# Patient Record
Sex: MALE | Race: WHITE | NOT HISPANIC OR LATINO | Employment: OTHER | URBAN - NONMETROPOLITAN AREA
[De-identification: names, ages, dates, MRNs, and addresses within clinical notes are randomized per-mention and may not be internally consistent; named-entity substitution may affect disease eponyms.]

---

## 2017-01-03 ENCOUNTER — OFFICE VISIT (OUTPATIENT)
Dept: FAMILY MEDICINE | Facility: CLINIC | Age: 72
End: 2017-01-03
Payer: COMMERCIAL

## 2017-01-03 VITALS
WEIGHT: 191 LBS | BODY MASS INDEX: 28.29 KG/M2 | TEMPERATURE: 98.7 F | DIASTOLIC BLOOD PRESSURE: 74 MMHG | HEART RATE: 88 BPM | HEIGHT: 69 IN | SYSTOLIC BLOOD PRESSURE: 132 MMHG | OXYGEN SATURATION: 93 %

## 2017-01-03 DIAGNOSIS — I10 HYPERTENSION, BENIGN ESSENTIAL, GOAL BELOW 140/90: ICD-10-CM

## 2017-01-03 DIAGNOSIS — C04.0 MALIGNANT NEOPLASM OF ANTERIOR PORTION OF FLOOR OF MOUTH (H): ICD-10-CM

## 2017-01-03 DIAGNOSIS — N50.89 ENLARGED TESTICLE: ICD-10-CM

## 2017-01-03 DIAGNOSIS — D49.0 IPMN (INTRADUCTAL PAPILLARY MUCINOUS NEOPLASM): ICD-10-CM

## 2017-01-03 DIAGNOSIS — E11.42 TYPE 2 DIABETES MELLITUS WITH DIABETIC POLYNEUROPATHY, WITHOUT LONG-TERM CURRENT USE OF INSULIN (H): Primary | ICD-10-CM

## 2017-01-03 DIAGNOSIS — Z11.59 NEED FOR HEPATITIS C SCREENING TEST: ICD-10-CM

## 2017-01-03 LAB
ALBUMIN SERPL-MCNC: 3.3 G/DL (ref 3.4–5)
ALP SERPL-CCNC: 69 U/L (ref 40–150)
ALT SERPL W P-5'-P-CCNC: 22 U/L (ref 0–70)
ANION GAP SERPL CALCULATED.3IONS-SCNC: 10 MMOL/L (ref 3–14)
AST SERPL W P-5'-P-CCNC: 17 U/L (ref 0–45)
BILIRUB SERPL-MCNC: 0.4 MG/DL (ref 0.2–1.3)
BUN SERPL-MCNC: 15 MG/DL (ref 7–30)
CALCIUM SERPL-MCNC: 9.1 MG/DL (ref 8.5–10.1)
CHLORIDE SERPL-SCNC: 100 MMOL/L (ref 94–109)
CO2 SERPL-SCNC: 25 MMOL/L (ref 20–32)
CREAT SERPL-MCNC: 0.76 MG/DL (ref 0.66–1.25)
ERYTHROCYTE [DISTWIDTH] IN BLOOD BY AUTOMATED COUNT: 16.7 % (ref 10–15)
GFR SERPL CREATININE-BSD FRML MDRD: ABNORMAL ML/MIN/1.7M2
GLUCOSE SERPL-MCNC: 194 MG/DL (ref 70–99)
HBA1C MFR BLD: 8 % (ref 4.3–6)
HCT VFR BLD AUTO: 40.4 % (ref 40–53)
HCV AB SERPL QL IA: NORMAL
HGB BLD-MCNC: 13.1 G/DL (ref 13.3–17.7)
MCH RBC QN AUTO: 31.9 PG (ref 26.5–33)
MCHC RBC AUTO-ENTMCNC: 32.4 G/DL (ref 31.5–36.5)
MCV RBC AUTO: 98 FL (ref 78–100)
PLATELET # BLD AUTO: 295 10E9/L (ref 150–450)
POTASSIUM SERPL-SCNC: 4 MMOL/L (ref 3.4–5.3)
PROT SERPL-MCNC: 7.9 G/DL (ref 6.8–8.8)
RBC # BLD AUTO: 4.11 10E12/L (ref 4.4–5.9)
SODIUM SERPL-SCNC: 135 MMOL/L (ref 133–144)
TSH SERPL DL<=0.05 MIU/L-ACNC: 1.37 MU/L (ref 0.4–4)
WBC # BLD AUTO: 11.7 10E9/L (ref 4–11)

## 2017-01-03 PROCEDURE — 86803 HEPATITIS C AB TEST: CPT | Mod: 90 | Performed by: FAMILY MEDICINE

## 2017-01-03 PROCEDURE — 85027 COMPLETE CBC AUTOMATED: CPT | Performed by: FAMILY MEDICINE

## 2017-01-03 PROCEDURE — 83036 HEMOGLOBIN GLYCOSYLATED A1C: CPT | Performed by: FAMILY MEDICINE

## 2017-01-03 PROCEDURE — 36415 COLL VENOUS BLD VENIPUNCTURE: CPT | Performed by: FAMILY MEDICINE

## 2017-01-03 PROCEDURE — 99214 OFFICE O/P EST MOD 30 MIN: CPT | Performed by: FAMILY MEDICINE

## 2017-01-03 PROCEDURE — 84443 ASSAY THYROID STIM HORMONE: CPT | Mod: 90 | Performed by: FAMILY MEDICINE

## 2017-01-03 PROCEDURE — 99000 SPECIMEN HANDLING OFFICE-LAB: CPT | Performed by: FAMILY MEDICINE

## 2017-01-03 PROCEDURE — 80053 COMPREHEN METABOLIC PANEL: CPT | Mod: 90 | Performed by: FAMILY MEDICINE

## 2017-01-03 NOTE — PROGRESS NOTES
"  SUBJECTIVE:                                                    Antoine Russo is a 71 year old male who presents to clinic today for the following health issues:    Diabetes Follow-up      Patient is checking blood sugars: rarely.      Diabetic concerns: blood sugar frequently over 200     Symptoms of hypoglycemia (low blood sugar): none     Paresthesias (numbness or burning in feet) or sores: Yes numb and pain     Date of last diabetic eye exam: ?   Metformin 750 mg  A1C      6.6   7/15/2016  A1C      7.8   4/4/2016  A1C      6.9   3/14/2016  A1C      6.9   3/12/2016  A1C      6.6   1/18/2016  He does not frequently check his BGs at home.  His last one was 326 on new years day in the morning. He stoped the metformin for about a week and a half and when he was sick and felt that his BGs went down.     Atenolol 100 mg  Hypertension Follow-up      Outpatient blood pressures are not being checked.    Low Salt Diet: not monitoring salt     BP Readings from Last 6 Encounters:   01/03/17 132/74   07/15/16 118/72   07/12/16 134/67   06/08/16 127/64   06/07/16 153/75   05/26/16 124/82       Sleep-  When he wakes up during the night, he cannot get back to sleep. When he is sleeping, he feels that he gets a deep sleep.  He also had a time frame when he was tired all the time. This only lasted a week or two. Now feels back to normal.    Left testicle-  He feels that his left testicle is 2-3x as big as his right and that is has been getting bigger over time. He says that this had been going on for several months. He says that \"it feels like someone kicked him\" if he sits wrong sometimes. Follows with Dr. Morales for urology. Saw him in July. He put him on Flomax which is working.    Aspirin-  He has not been taking his aspirin because he was getting nose bleeds.       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 Gerson Ravi and aristides at the  in 2012. He will now sees the q 6 months and will see them in Feb.    Problem list and histories reviewed & adjusted, as indicated.  Additional history: as documented    Recent Labs   Lab Test  08/17/16   0804  07/15/16   0904  04/18/16   0747  04/04/16   0659  03/31/16   0545  03/30/16   1426  03/14/16   0640  01/18/16   0843  04/14/15   0853   A1C   --   6.6*   --   7.8*   --    --   6.9*  6.6*  8.5*   LDL   --   110*   --    --    --    --    --   86  76   HDL   --   43   --    --    --    --    --   39*  27*   TRIG   --   134   --    --    --    --    --   136  206*   ALT   --   24   --    --   16  19   --   59  71*   CR   --   0.64*  0.69  0.68  0.89  1.18  0.66  0.67  0.66   GFRESTIMATED  84  >90  Non  GFR Calc    >90  Non  GFR Calc    >90  Non  GFR Calc    85  61  >90  Non  GFR Calc    >90  Non  GFR Calc    >90  Non  GFR Calc     GFRESTBLACK  >90  >90  African American GFR Calc    >90   GFR Calc    >90   GFR Calc    >90   GFR Calc    74  >90   GFR Calc    >90   GFR Calc    >90   GFR Calc     POTASSIUM   --   4.4  4.0  3.7  4.2  5.0  4.1  4.8  4.2   TSH   --    --    --    --    --    --    --   1.51  1.59    < > = values in this interval not displayed.      BP Readings from Last 3 Encounters:   01/03/17 132/74   07/15/16 118/72   07/12/16 134/67    Wt Readings from Last 3 Encounters:   01/03/17 86.637 kg (191 lb)   08/10/16 88.905 kg (196 lb)   07/15/16 88.905 kg (196 lb)           ROS:  C: NEGATIVE for fever, chills, change in weight  E/M: NEGATIVE for ear,  "mouth and throat problems  R: NEGATIVE for significant cough or SOB  CV: NEGATIVE for chest pain, palpitations or peripheral edema  GI: NEGATIVE for nausea, abdominal pain, heartburn, or change in bowel habits. POSITIVE for chronic diarrhea.  : negative for, dysuria, hematuria, hesitancy, retention and urgency  MUSCULOSKELETAL: NEGATIVE for significant arthralgias or myalgia  ENDOCRINE: NEGATIVE for temperature intolerance, skin/hair changes, POSITIVE  for Hx diabetes    OBJECTIVE:                                                    /74 mmHg  Pulse 88  Temp(Src) 98.7  F (37.1  C) (Tympanic)  Ht 1.753 m (5' 9\")  Wt 86.637 kg (191 lb)  BMI 28.19 kg/m2  SpO2 93%  Body mass index is 28.19 kg/(m^2).  GENERAL: healthy, alert and no distress  HEENT: TMs and canals normal, edison: post op changes, no lesions  NECK: no adenopathy, no asymmetry, masses, or scars and thyroid normal to palpation  RESP: lungs clear to auscultation - no rales, rhonchi or wheezes  CV: regular rate and rhythm, normal S1 S2, no S3 or S4, no murmur, click or rub,   ABDOMEN: soft, nontender, no hepatosplenomegaly, no masses. Several scars including a large midline scar and transverse left upper scar.   (male): Right testicle smooth, nontender. Multiple vessels palpable in left testicle. No masses or cysts palpable, tender.to palpation  MS: no gross musculoskeletal defects noted, no edema    Diagnostic Test Results:  Results for orders placed or performed in visit on 01/03/17 (from the past 24 hour(s))   HEMOGLOBIN A1C   Result Value Ref Range    Hemoglobin A1C 8.0 (H) 4.3 - 6.0 %   CBC with platelets   Result Value Ref Range    WBC 11.7 (H) 4.0 - 11.0 10e9/L    RBC Count 4.11 (L) 4.4 - 5.9 10e12/L    Hemoglobin 13.1 (L) 13.3 - 17.7 g/dL    Hematocrit 40.4 40.0 - 53.0 %    MCV 98 78 - 100 fl    MCH 31.9 26.5 - 33.0 pg    MCHC 32.4 31.5 - 36.5 g/dL    RDW 16.7 (H) 10.0 - 15.0 %    Platelet Count 295 150 - 450 10e9/L        ASSESSMENT/PLAN:   "                                                    ASSESSMENT:  1. Type 2 diabetes mellitus with diabetic polyneuropathy, without long-term current use of insulin (H)    2. Hypertension, benign essential, goal below 140/90    3. Enlarged testicle    4. Need for hepatitis C screening test    5. IPMN (intraductal papillary mucinous neoplasm)    6. 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 Bili, TP)     CBC with platelets     TSH     UROLOGY ADULT REFERRAL     Discussed suggestions to help with sleep.  ultrasound of testicle, then follow up with Dr Morales  Recheck 3 months    Patient Instructions   Watch that diet    Sleep instructions given    This document serves as a record of the services and decisions personally performed and made by Katie Gross MD. It was created on her behalf by Ana Pereira, a trained medical scribe. The creation of this document is based the provider's statements to the medical scribe.  Ana Pereira 8:37 AM 1/3/2017    Provider:   The information in this document, created by the medical scribe for me, accurately reflects the services I personally performed and the decisions made by me. I have reviewed and approved this document for accuracy prior to leaving the patient care area.  Katie Gross MD 8:37 AM 1/3/2017    Katie Gross MD  [facility]

## 2017-01-03 NOTE — MR AVS SNAPSHOT
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)           Care Instructions    Watch that diet    General recommendations for sleep problems (Insomnia)  Allow 2-4 weeks to see results   Establish a regular sleep schedule   Go to bed at same time each night   Get up at same time each day   Avoid sleeping-in on Sunday morning   Cut down time in bed (if not asleep, get up)   Avoid trying to force yourself to sleep   Use your bed only for sleep and sex   Do not read or watch Television in bed   Make the bedroom comfortable   Keep temperature in your bedroom comfortable   Keep bedroom quiet when sleeping   Consider ear plugs (silicon)   Keep bedroom dark enough   Use dark blinds or wear an eye mask if needed   Relax at bedtime   Relax each muscle group individually   Begin with your feet   Work toward your head   Deal with your worries before bedtime   Set aside a worry time for 30 minutes earlier   Listen to relaxation tapes   Classical Music or Nature sounds   Imagine a tranquil scene (e.g. waterfall or beach)   Back Massage   Gentle 5-minute back rub prior to bedtime   Perform measures to make you tired at bedtime   Get regular Exercise each day (6 hours before bedtime)   Take medications only as directed   Eat a light bedtime snack or warm drink   Warm milk   Warm herbal tea (non-caffeinated)   Special Therapy  Measures   Sleep Restriction Therapy   Limit time in bed for 15 minutes more than sleep   Do not set limit under 4 hours   Increase time by 15 minutes per effective sleep trial   Effective sleep suggests >90% of bedtime asleep   Stimulus Control   Do not lie awake for more than 30 minutes   Get out of bed and perform a quiet activity   Return to bed when sleepy   Repeat as many times per night as needed   No Napping during day   Things to avoid   Do not Exercise just before bedtime   No overstimulating activities just before bed   No competitive games before bedtime   No exciting Television programs before bedtime   Avoid caffeine after lunchtime   Avoid chocolate   Avoid coffee, tea, or soda   Do not use alcohol to induce sleep (worsens Insomnia)   Do not take someone else's sleeping pills   Do not look at the clock when awakening   Do not turn on light when getting up to use bathroom   Read the book Say Good Night To Insomnia        Follow-ups after your visit        Additional Services     UROLOGY ADULT REFERRAL       Your provider has referred you to: [facility]: [facility] [phone]   [email or web address]    Please be aware that coverage of these services is subject to the terms and limitations of your health insurance plan.  Call member services at your health plan with any benefit or coverage questions.      Please bring the following with you to your appointment:    (1) Any X-Rays, CTs or MRIs which have been performed.  Contact the facility where they were done to arrange for  prior to your scheduled appointment.    (2) List of current medications  (3) This referral request   (4) Any documents/labs given to you for this referral                  Your next 10 appointments already scheduled     Feb 08, 2017 10:10 AM   (Arrive by 9:55 AM)   RETURN TUMOR VISIT with Gerson Ravi MD   [facility] ([facility])    [address] [id number] [phone]              Future tests that were ordered for you today     Open Future Orders        Priority Expected Expires Ordered    US Testicular & Scrotum w Doppler Ltd Routine  1/3/2018 1/3/2017            Who to contact     If you have questions or need follow up information about today's clinic visit or your schedule please contact [facility] directly at [phone].  Normal or non-critical lab and imaging results will be communicated to you by [employer], letter or phone within 4 business days after the clinic has received the results. If you do not hear from us within 7 days, please contact the clinic through artaculous or phone. If you have a critical or abnormal lab result, we will notify you by phone as soon as possible.  Submit refill requests through artaculous or call your pharmacy and they will forward the refill request to us. Please allow 3 business days for your refill to be completed.          Additional Information About Your Visit        artaculous Information     [employer]ulous gives you secure access to your electronic health record. If you see a primary care provider, you can also send messages to your care team and make appointments. If you have questions, please call your primary care clinic.  If you do not have a primary care provider, please call [phone] and they will assist you.        Your Vitals Were     Pulse Temperature Height BMI (Body Mass Index) Pulse Oximetry       88 98.7  F (37.1  C) (Tympanic) 5' 9\" (1.753 m) 28.19 kg/m2 93%        Blood Pressure from Last 3 Encounters:   01/03/17 132/74   07/15/16 118/72   07/12/16 134/67    Weight from Last 3 Encounters:   01/03/17 191 lb (86.637 kg)   08/10/16 196 lb (88.905 kg)   07/15/16 196 lb (88.905 kg)              We Performed the Following     CBC with platelets     Comprehensive metabolic panel (BMP + Alb, Alk Phos, ALT, AST, Total. Bili, TP)     HEMOGLOBIN A1C     " Hepatitis C Screen Reflex to HCV RNA Quant and Genotype     TSH     UROLOGY ADULT REFERRAL        Primary Care Provider Office Phone # Fax #    Katie Gross [phone]       [facility] [address]  [address]        Thank you!     Thank you for choosing [facility]  for your care. Our goal is always to provide you with excellent care. Hearing back from our patients is one way we can continue to improve our services. Please take a few minutes to complete the written survey that you may receive in the mail after your visit with us. Thank you!             Your Updated Medication List - Protect others around you: Learn how to safely use, store and throw away your medicines at www.disposemymeds.org.          This list is accurate as of: 1/3/17  9:05 AM.  Always use your most recent med list.                   Brand Name Dispense Instructions for use    aspirin 81 MG tablet      Take 81 mg by mouth daily       atenolol 100 MG tablet    TENORMIN    90 tablet    Take 1 tablet (100 mg) by mouth daily       blood glucose lancets standard    no brand specified    1 Box    Use to test blood sugar two times daily       blood glucose monitoring test strip    no brand specified    100 each    Use to test blood sugar two times daily       metFORMIN 750 MG 24 hr tablet    GLUCOPHAGE-XR    180 tablet    Take 2 tablets (1,500 mg) by mouth daily       multivitamin, therapeutic with minerals Tabs tablet     30 each    Take 1 tablet by mouth daily.       tamsulosin 0.4 MG capsule    FLOMAX    90 capsule    Take 1 capsule (0.4 mg) by mouth daily

## 2017-01-03 NOTE — NURSING NOTE
"Chief Complaint   Patient presents with     Hypertension     recheck     Diabetes       Initial /74 mmHg  Pulse 88  Temp(Src) 98.7  F (37.1  C) (Tympanic)  Ht 5' 9\" (1.753 m)  Wt 191 lb (86.637 kg)  BMI 28.19 kg/m2  SpO2 93% Estimated body mass index is 28.19 kg/(m^2) as calculated from the following:    Height as of this encounter: 5' 9\" (1.753 m).    Weight as of this encounter: 191 lb (86.637 kg).  BP completed using cuff size: large    "

## 2017-01-03 NOTE — PATIENT INSTRUCTIONS
Watch that diet    General recommendations for sleep problems (Insomnia)  Allow 2-4 weeks to see results   Establish a regular sleep schedule   Go to bed at same time each night   Get up at same time each day   Avoid sleeping-in on Sunday morning   Cut down time in bed (if not asleep, get up)   Avoid trying to force yourself to sleep   Use your bed only for sleep and sex   Do not read or watch Television in bed   Make the bedroom comfortable   Keep temperature in your bedroom comfortable   Keep bedroom quiet when sleeping   Consider ear plugs (silicon)   Keep bedroom dark enough   Use dark blinds or wear an eye mask if needed   Relax at bedtime   Relax each muscle group individually   Begin with your feet   Work toward your head   Deal with your worries before bedtime   Set aside a worry time for 30 minutes earlier   Listen to relaxation tapes   Classical Music or Nature sounds   Imagine a tranquil scene (e.g. waterfall or beach)   Back Massage   Gentle 5-minute back rub prior to bedtime   Perform measures to make you tired at bedtime   Get regular Exercise each day (6 hours before bedtime)   Take medications only as directed   Eat a light bedtime snack or warm drink   Warm milk   Warm herbal tea (non-caffeinated)   Special Therapy Measures   Sleep Restriction Therapy   Limit time in bed for 15 minutes more than sleep   Do not set limit under 4 hours   Increase time by 15 minutes per effective sleep trial   Effective sleep suggests >90% of bedtime asleep   Stimulus Control   Do not lie awake for more than 30 minutes   Get out of bed and perform a quiet activity   Return to bed when sleepy   Repeat as many times per night as needed   No Napping during day   Things to avoid   Do not Exercise just before bedtime   No overstimulating activities just before bed   No competitive games before bedtime   No exciting Television programs before bedtime   Avoid caffeine after lunchtime   Avoid chocolate   Avoid coffee, tea, or  soda   Do not use alcohol to induce sleep (worsens Insomnia)   Do not take someone else's sleeping pills   Do not look at the clock when awakening   Do not turn on light when getting up to use bathroom   Read the book Say Good Night To Insomnia

## 2017-01-05 ENCOUNTER — HOSPITAL ENCOUNTER (OUTPATIENT)
Dept: ULTRASOUND IMAGING | Facility: CLINIC | Age: 72
Discharge: HOME | End: 2017-01-05
Attending: FAMILY MEDICINE | Admitting: FAMILY MEDICINE
Payer: COMMERCIAL

## 2017-01-05 DIAGNOSIS — N50.89 ENLARGED TESTICLE: ICD-10-CM

## 2017-01-05 PROCEDURE — 93976 VASCULAR STUDY: CPT

## 2017-01-31 ENCOUNTER — OFFICE VISIT (OUTPATIENT)
Dept: UROLOGY | Facility: CLINIC | Age: 72
End: 2017-01-31
Payer: COMMERCIAL

## 2017-01-31 VITALS
DIASTOLIC BLOOD PRESSURE: 75 MMHG | HEART RATE: 67 BPM | SYSTOLIC BLOOD PRESSURE: 154 MMHG | BODY MASS INDEX: 27.99 KG/M2 | WEIGHT: 189 LBS | HEIGHT: 69 IN | RESPIRATION RATE: 16 BRPM

## 2017-01-31 DIAGNOSIS — N45.1 EPIDIDYMITIS: Primary | ICD-10-CM

## 2017-01-31 PROCEDURE — 51798 US URINE CAPACITY MEASURE: CPT | Performed by: UROLOGY

## 2017-01-31 PROCEDURE — 99214 OFFICE O/P EST MOD 30 MIN: CPT | Mod: 25 | Performed by: UROLOGY

## 2017-01-31 RX ORDER — CIPROFLOXACIN 500 MG/1
500 TABLET, FILM COATED ORAL 2 TIMES DAILY
Qty: 14 TABLET | Refills: 0 | Status: SHIPPED | OUTPATIENT
Start: 2017-01-31 | End: 2017-02-28

## 2017-01-31 NOTE — PATIENT INSTRUCTIONS
Take Advil (ibuprofen) 600 mg (three 200 mg tablets) three times each day for 5 days.    Take ciprofloxacin 500 mg twice each day for one week.

## 2017-01-31 NOTE — NURSING NOTE
"Chief Complaint   Patient presents with     RECHECK     US Results, Epididymal Cysts        Initial /75 mmHg  Pulse 67  Resp 16  Ht 5' 9\" (1.753 m)  Wt 189 lb (85.73 kg)  BMI 27.90 kg/m2 Estimated body mass index is 27.9 kg/(m^2) as calculated from the following:    Height as of this encounter: 5' 9\" (1.753 m).    Weight as of this encounter: 189 lb (85.73 kg).  BP completed using cuff size: vivian Ashford CMA     "

## 2017-01-31 NOTE — NURSING NOTE
Active order to obtain bladder scan? Yes   Name of ordering provider:  Dr Morales  Bladder scan preformed post void No.  Bladder scan reveled 117ML  Provider notified?  Yes    Joellen Ashford CMA

## 2017-01-31 NOTE — PROGRESS NOTES
Appointment source: Established Patient  Patient name: Antoine LOPEZ Rafaela  Urology Staff: Steven Morales MD    Subjective: This is a 71 year old year old male complaining of left hemiscrotal pain    Objective:  States that he has been having pain in the left hemiscrotum that is sometimes severe for several months. Denies any trauma.    Has a past history of squamous cell cancer of the mouth and pancreatic neoplasm both managed surgically.    Concerned, with his past neoplastic history, about any discomforts and or masses developing anywhere.    Voiding symptoms currently satisfactory while taking tamsulosin. Post void residual slightly higher today but did not void immediately prior to the study    Examination:    External genitalia normal in appearance  Right hemiscrotum normal  Left hemiscrotum reveals induration of the vas deferens and epididymis  Generalized mild tenderness noted in the left hemiscrotum    Assessment:  Mild to moderate scrotal discomfort with anatomic changes in the scrotum that may be secondary to previous cancer surgery.    Plan:  Will treat symptomatically for now and then return for follow up as needed.    Total time 25 minutes, counseling 15 minutes discussing clinical significance of scrotal discomfort.

## 2017-01-31 NOTE — MR AVS SNAPSHOT
After Visit Summary   1/31/2017    Antoine Russo    MRN: [id number]           Patient Information     Date Of Birth          1945        Visit Information        Provider Department      1/31/2017 8:00 AM TEJINDER Morales MD [facility]        Today's Diagnoses     Epididymitis    -  1       Care Instructions    Take Advil (ibuprofen) 600 mg (three 200 mg tablets) three times each day for 5 days.    Take ciprofloxacin 500 mg twice each day for one week.          Follow-ups after your visit        Your next 10 appointments already scheduled     Feb 08, 2017 10:10 AM   (Arrive by 9:55 AM)   RETURN TUMOR VISIT with Gerson Ravi MD   [facility]ose [facility] ([facility] [place])    [address]  [address] [facility] [address] [phone]            Apr 04, 2017  8:00 AM   Office Visit with Katie Gross MD   [facility] ([facility])    [address] [place] [address] [phone]           Bring a current list of meds and any records pertaining to this visit.  For Physicals, please bring immunization records and any forms needing to be filled out.  Please arrive 10 minutes early to complete paperwork.              Who to contact     If you have questions or need follow up information about today's clinic visit or your schedule please contact [facility] directly at [phone].  Normal or non-critical lab and imaging results will be communicated to you by MyChart, letter or phone within 4 business days after the clinic has received the results. If you do not hear from us within 7 days, please contact the clinic through MyChart or phone. If you have a critical or abnormal lab result, we will notify you by phone as soon as possible.  Submit refill requests through [employer] or call your pharmacy and they will forward the refill request to us. Please allow 3 business days for your refill to be  "completed.          Additional Information About Your Visit        [facility] Information     [employer] gives you secure access to your electronic health record. If you see a primary care provider, you can also send messages to your care team and make appointments. If you have questions, please call your primary care clinic.  If you do not have a primary care provider, please call [phone] and they will assist you.        Care EveryWhere ID     This is your Care EveryWhere ID. This could be used by other organizations to access your [place] medical records  [id number]        Your Vitals Were     Pulse Respirations Height BMI (Body Mass Index)          67 16 5' 9\" (1.753 m) 27.90 kg/m2         Blood Pressure from Last 3 Encounters:   01/31/17 154/75   01/03/17 132/74   07/15/16 118/72    Weight from Last 3 Encounters:   01/31/17 189 lb (85.73 kg)   01/03/17 191 lb (86.637 kg)   08/10/16 196 lb (88.905 kg)              We Performed the Following     MEASURE POST-VOID RESIDUAL URINE/BLADDER CAPACITY, US NON-IMAGING          Today's Medication Changes          These changes are accurate as of: 1/31/17  8:23 AM.  If you have any questions, ask your nurse or doctor.               Start taking these medicines.        Dose/Directions    ciprofloxacin 500 MG tablet   Commonly known as:  CIPRO   Used for:  Epididymitis   Started by:  TEJINDER Morales MD        Dose:  500 mg   Take 1 tablet (500 mg) by mouth 2 times daily   Quantity:  14 tablet   Refills:  0            Where to get your medicines      These medications were sent to [facility] [address] [place] [address] [address]  [address] [address]     Phone:  [phone]    - ciprofloxacin 500 MG tablet             Primary Care Provider Office Phone # Fax #    Katie Gross [phone]       [facility] [address] [facility] [address]        Thank you!     Thank you for choosing [facility]  for " your care. Our goal is always to provide you with excellent care. Hearing back from our patients is one way we can continue to improve our services. Please take a few minutes to complete the written survey that you may receive in the mail after your visit with us. Thank you!             Your Updated Medication List - Protect others around you: Learn how to safely use, store and throw away your medicines at www.disposemymeds.org.          This list is accurate as of: 1/31/17  8:23 AM.  Always use your most recent med list.                   Brand Name Dispense Instructions for use    aspirin 81 MG tablet      Take 81 mg by mouth daily       atenolol 100 MG tablet    TENORMIN    90 tablet    Take 1 tablet (100 mg) by mouth daily       blood glucose lancets standard    no brand specified    1 Box    Use to test blood sugar two times daily       blood glucose monitoring test strip    no brand specified    100 each    Use to test blood sugar two times daily       ciprofloxacin 500 MG tablet    CIPRO    14 tablet    Take 1 tablet (500 mg) by mouth 2 times daily       metFORMIN 750 MG 24 hr tablet    GLUCOPHAGE-XR    180 tablet    Take 2 tablets (1,500 mg) by mouth daily       multivitamin, therapeutic with minerals Tabs tablet     30 each    Take 1 tablet by mouth daily.       tamsulosin 0.4 MG capsule    FLOMAX    90 capsule    Take 1 capsule (0.4 mg) by mouth daily

## 2017-02-08 ENCOUNTER — OFFICE VISIT (OUTPATIENT)
Dept: OTOLARYNGOLOGY | Facility: CLINIC | Age: 72
End: 2017-02-08

## 2017-02-08 VITALS — HEIGHT: 69 IN | WEIGHT: 195 LBS | BODY MASS INDEX: 28.88 KG/M2

## 2017-02-08 DIAGNOSIS — C04.9 MALIGNANT NEOPLASM OF FLOOR OF MOUTH (H): Primary | ICD-10-CM

## 2017-02-08 DIAGNOSIS — C06.9 ORAL CANCER (H): ICD-10-CM

## 2017-02-08 ASSESSMENT — PAIN SCALES - GENERAL: PAINLEVEL: MILD PAIN (2)

## 2017-02-08 NOTE — LETTER
2/8/2017       RE: Antoine Russo  [address] [address]     Dear Colleague,    Thank you for referring your patient, Antoine Russo, to the [facility] EAR NOSE AND TH[facility]AT at [facility]. Please see a copy of my visit note below.    February 8, 2017      PRIOR ONCOLOGIC HISTORY:  Mr. 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  He did not have postoperative radiotherapy and had a forearm free flap reconstruction by Dr. Yung Alvares.  His major difficulty occurred in 2013, when he developed C. diff colitis and had to have a colectomy.      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  He had a post-operative infection that required an extended stay in the hospital. He has since recovered since that time.     HISTORY OF PRESENT ILLNESS:    has been doing very well.  He is eating well and not having any trouble retaining food in the mouth.  He is swallowing well as well.  There has been absolutely no symptoms, and he feels great.      PHYSICAL EXAMINATION:  He looks remarkably well.  His speech is clear.  The floor of the mouth looks beautiful.  The flap is in good position and all the induration is now completely gone, and it is very soft.  The tongue moves well, and his speech is clear.  The oral cavity and oropharynx look clear as well.  Palpation of the tongue and floor of mouth are clear.  Palpation of the base of tongue is soft but I cannot adequately feel the base of tongue.  The neck has no evidence of adenopathy.      IMPRESSION AND PLAN:  I offered Mr. Russo a fiberoptic examination today, but he would like to defer.  We need to do this next time.  We will see him back at his five year  anniversary in August with a CT scan.   He remains with no evidence of disease at this point.       Gerson Ravi MD  Otolaryngology/Head & Neck Surgery  [phone]    cc:   Katie Yoder MD   [facility]    [address] [place] [place]  [address]      Andrea García MD   [facility]   [address] [place] [place]  [address]      Yung Alvares MD   Otolaryngology    [facility] 396     Grabiel Plata MD   Division of Gastroenterology   [facility] 36

## 2017-02-08 NOTE — PROGRESS NOTES
February 8, 2017      PRIOR ONCOLOGIC HISTORY:  Mr. 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  He did not have postoperative radiotherapy and had a forearm free flap reconstruction by Dr. Yung Alvares.  His major difficulty occurred in 2013, when he developed C. diff colitis and had to have a colectomy.      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  He had a post-operative infection that required an extended stay in the hospital. He has since recovered since that time.     HISTORY OF PRESENT ILLNESS:    has been doing very well.  He is eating well and not having any trouble retaining food in the mouth.  He is swallowing well as well.  There has been absolutely no symptoms, and he feels great.      PHYSICAL EXAMINATION:  He looks remarkably well.  His speech is clear.  The floor of the mouth looks beautiful.  The flap is in good position and all the induration is now completely gone, and it is very soft.  The tongue moves well, and his speech is clear.  The oral cavity and oropharynx look clear as well.  Palpation of the tongue and floor of mouth are clear.  Palpation of the base of tongue is soft but I cannot adequately feel the base of tongue.  The neck has no evidence of adenopathy.      IMPRESSION AND PLAN:  I offered Mr. Russo a fiberoptic examination today, but he would like to defer.  We need to do this next time.  We will see him back at his five year anniversary in August with a CT scan.   He remains with no evidence of disease at this point.       Gerson Ravi MD  Otolaryngology/Head & Neck Surgery  [phone]            cc:   Katie Yoder MD   [address] [place]  [address]      Andrea García MD   [place]   [address] [place]  [address]      Yung Alvares MD   Otolaryngology    [facility] 396     Grabiel Plata MD   Division of Gastroenterology   [facility] 36

## 2017-02-28 ENCOUNTER — OFFICE VISIT (OUTPATIENT)
Dept: UROLOGY | Facility: CLINIC | Age: 72
End: 2017-02-28
Payer: COMMERCIAL

## 2017-02-28 VITALS — DIASTOLIC BLOOD PRESSURE: 84 MMHG | SYSTOLIC BLOOD PRESSURE: 144 MMHG | HEART RATE: 74 BPM | RESPIRATION RATE: 18 BRPM

## 2017-02-28 DIAGNOSIS — I86.1 LEFT VARICOCELE: Primary | ICD-10-CM

## 2017-02-28 PROCEDURE — 99213 OFFICE O/P EST LOW 20 MIN: CPT | Performed by: UROLOGY

## 2017-02-28 NOTE — MR AVS SNAPSHOT
After Visit Summary   2/28/2017    Antoine Russo    MRN: [id number]           Patient Information     Date Of Birth          1945        Visit Information        Provider Department      2/28/2017 8:00 AM TEJINDER Morales MD [facility]        Today's Diagnoses     Left varicocele    -  1      Care Instructions    Per Physician's instructions          Follow-ups after your visit        Your next 10 appointments already scheduled     Apr 04, 2017  8:00 AM CDT   Office Visit with Katie Gross MD   [facility] ([facility])    [address] [facility] [address] [phone]           Bring a current list of meds and any records pertaining to this visit.  For Physicals, please bring immunization records and any forms needing to be filled out.  Please arrive 10 minutes early to complete paperwork.            Jul 31, 2017  8:00 AM CDT   CT CHEST W CONTRAST with WYCT1   [facility] CT ([facility])    [address] [facility] [address] [facility] [address] [address] [phone]           Please bring any scans or X-rays taken at other hospitals, if similar tests were done. Also bring a list of your medicines, including vitamins, minerals and over-the-counter drugs. It is safest to leave personal items at home.  Be sure to tell your doctor:   If you have any allergies.   If there s any chance you are pregnant.   If you are breastfeeding.   If you have any special needs.  You will have contrast for this exam. To prepare:   Do not eat or drink for 2 hours before your exam. If you need to take medicine, you may take it with small sips of water. (We may ask you to take liquid medicine as well.)   The day before your exam, drink extra fluids at least six 8-ounce glasses (unless your doctor tells you to restrict your fluids).  Patients over 70 or patients with diabetes or kidney problems:   If you haven t had a blood test (creatinine test) within the  last 30 days, go to your clinic or Diagnostic Imaging Department for this test.  If you have diabetes:   If your kidney function is normal, continue taking your metformin (Avandamet, Glucophage, Glucovance, Metaglip) on the day of your exam.   If your kidney function is abnormal, wait 48 hours before restarting this medicine.  Please wear loose clothing, such as a sweat suit or jogging clothes. Avoid snaps, zippers and other metal. We may ask you to undress and put on a hospital gown.  If you have any questions, please call the Imaging Department where you will have your exam.            Jul 31, 2017  8:30 AM CDT   CT SOFT TISSUE NECK W CONTRAST with WYCT1   [facility] CT ([facility])    [address] [address] [phone]           Please bring any scans or X-rays taken at other hospitals, if similar tests were done. Also bring a list of your medicines, including vitamins, minerals and over-the-counter drugs. It is safest to leave personal items at home.  Be sure to tell your doctor:   If you have any allergies.   If there s any chance you are pregnant.   If you are breastfeeding.   If you have any special needs.  You will have contrast for this exam. To prepare:   Do not eat or drink for 2 hours before your exam. If you need to take medicine, you may take it with small sips of water. (We may ask you to take liquid medicine as well.)   The day before your exam, drink extra fluids at least six 8-ounce glasses (unless your doctor tells you to restrict your fluids).  Patients over 70 or patients with diabetes or kidney problems:   If you haven t had a blood test (creatinine test) within the last 30 days, go to your clinic or Diagnostic Imaging Department for this test.  If you have diabetes:   If your kidney function is normal, continue taking your metformin (Avandamet, Glucophage, Glucovance, Metaglip) on the day of your exam.   If your kidney function is abnormal, wait 48  hours before restarting this medicine.  Please wear loose clothing, such as a sweat suit or jogging clothes. Avoid snaps, zippers and other metal. We may ask you to undress and put on a hospital gown.  If you have any questions, please call the Imaging Department where you will have your exam.            Aug 02, 2017 10:00 AM CDT   (Arrive by 9:45 AM)   RETURN TUMOR VISIT with Gerson Ravi MD   [facility] Ear Nose and Th[facility]at ([facility] [place])    [address] [place] [address]  [address] [facility] [address] [phone]              Who to contact     If you have questions or need follow up information about today's clinic visit or your schedule please contact [facility] directly at [phone].  Normal or non-critical lab and imaging results will be communicated to you by MyChart, letter or phone within 4 business days after the clinic has received the results. If you do not hear from us within 7 days, please contact the clinic through [employer] or phone. If you have a critical or abnormal lab result, we will notify you by phone as soon as possible.  Submit refill requests through [employer] or call your pharmacy and they will forward the refill request to us. Please allow 3 business days for your refill to be completed.          Additional Information About Your Visit        [employer] Information     [employer] gives you secure access to your electronic health record. If you see a primary care provider, you can also send messages to your care team and make appointments. If you have questions, please call your primary care clinic.  If you do not have a primary care provider, please call [phone] and they will assist you.        Care EveryWhere ID     This is your Care EveryWhere ID. This could be used by other organizations to access your [place] medical records  [id number]        Your Vitals Were     Pulse Respirations                74 18           Blood Pressure from Last 3 Encounters:    02/28/17 144/84   01/31/17 154/75   01/03/17 132/74    Weight from Last 3 Encounters:   02/08/17 195 lb (88.5 kg)   01/31/17 189 lb (85.7 kg)   01/03/17 191 lb (86.6 kg)              Today, you had the following     No orders found for display       Primary Care Provider Office Phone # Fax #    Katie Gross [phone]       [facility] [address] [facility] [address]        Thank you!     Thank you for choosing [facility]  for your care. Our goal is always to provide you with excellent care. Hearing back from our patients is one way we can continue to improve our services. Please take a few minutes to complete the written survey that you may receive in the mail after your visit with us. Thank you!             Your Updated Medication List - Protect others around you: Learn how to safely use, store and throw away your medicines at www.disposemymeds.org.          This list is accurate as of: 2/28/17 11:02 AM.  Always use your most recent med list.                   Brand Name Dispense Instructions for use    aspirin 81 MG tablet      Take 81 mg by mouth daily Reported on 2/28/2017       atenolol 100 MG tablet    TENORMIN    90 tablet    Take 1 tablet (100 mg) by mouth daily       blood glucose lancets standard    no brand specified    1 Box    Use to test blood sugar two times daily       blood glucose monitoring test strip    no brand specified    100 each    Use to test blood sugar two times daily       metFORMIN 750 MG 24 hr tablet    GLUCOPHAGE-XR    180 tablet    Take 2 tablets (1,500 mg) by mouth daily       multivitamin, therapeutic with minerals Tabs tablet     30 each    Take 1 tablet by mouth daily.       tamsulosin 0.4 MG capsule    FLOMAX    90 capsule    Take 1 capsule (0.4 mg) by mouth daily

## 2017-02-28 NOTE — PROGRESS NOTES
Appointment source: Established Patient  Patient name: Antoine LOPEZ Rafaela  Urology Staff: Steven Morales MD    Subjective: This is a 71 year old year old male returning for follow up of left hemiscrotal pain    Objective:  Continues to have pain in the left hemiscrotum.    Did get brief improvement from a course of antibiotic and antiinflammatory.    Examination reveals a varicocele.    Will ask him to be evaluated by Dr. Aggarwal for possible microscopic varicocelectomy. 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    Plan:  Consultation with Dr. Aggarwal.    Total time 20 minutes, counseling 15 minutes discussing management of scrotal pain.

## 2017-02-28 NOTE — NURSING NOTE
"Chief Complaint   Patient presents with     RECHECK     Left Scrotal Pain and Swelling        Initial /84 (BP Location: Right arm, Patient Position: Chair, Cuff Size: Adult Regular)  Pulse 74  Resp 18 Estimated body mass index is 29.21 kg/(m^2) as calculated from the following:    Height as of 2/8/17: 5' 8.5\" (1.74 m).    Weight as of 2/8/17: 195 lb (88.5 kg).  BP completed using cuff size: regular      Joellen Ashford CMA     "

## 2017-03-03 ENCOUNTER — PRE VISIT (OUTPATIENT)
Dept: UROLOGY | Facility: CLINIC | Age: 72
End: 2017-03-03

## 2017-03-10 ENCOUNTER — CARE COORDINATION (OUTPATIENT)
Dept: UROLOGY | Facility: CLINIC | Age: 72
End: 2017-03-10

## 2017-03-10 ENCOUNTER — OFFICE VISIT (OUTPATIENT)
Dept: UROLOGY | Facility: CLINIC | Age: 72
End: 2017-03-10

## 2017-03-10 VITALS
BODY MASS INDEX: 28.88 KG/M2 | WEIGHT: 195 LBS | DIASTOLIC BLOOD PRESSURE: 76 MMHG | HEART RATE: 66 BPM | SYSTOLIC BLOOD PRESSURE: 150 MMHG | HEIGHT: 69 IN

## 2017-03-10 DIAGNOSIS — N50.819 PAIN OF TESTES: ICD-10-CM

## 2017-03-10 DIAGNOSIS — I86.1 LEFT VARICOCELE: Primary | ICD-10-CM

## 2017-03-10 RX ORDER — CEFAZOLIN SODIUM 1 G/3ML
1 INJECTION, POWDER, FOR SOLUTION INTRAMUSCULAR; INTRAVENOUS SEE ADMIN INSTRUCTIONS
Status: CANCELLED | OUTPATIENT
Start: 2017-03-10

## 2017-03-10 ASSESSMENT — ENCOUNTER SYMPTOMS
BLOOD IN STOOL: 0
VOMITING: 0
BOWEL INCONTINENCE: 0
HEARTBURN: 0
CONSTIPATION: 0
ABDOMINAL PAIN: 1
RECTAL BLEEDING: 0
NAUSEA: 0
DIARRHEA: 1
JAUNDICE: 0
RECTAL PAIN: 0
BLOATING: 0

## 2017-03-10 ASSESSMENT — PAIN SCALES - GENERAL: PAINLEVEL: MODERATE PAIN (5)

## 2017-03-10 NOTE — MR AVS SNAPSHOT
After Visit Summary   3/10/2017    Antoine Russo    MRN: [id number]           Patient Information     Date Of Birth          1945        Visit Information        Provider Department      3/10/2017 9:20 AM Marcio Aggarwal MD [facility] and [facility]        Today's Diagnoses     Left varicocele    -  1    Pain of testes          Care Instructions    Schedule surgery with Dr Aggarwal    It was a pleasure meeting with you today.  Thank you for allowing me and my team the privilege of caring for you today.  YOU are the reason we are here, and I truly hope we provided you with the excellent service you deserve.  Please let us know if there is anything else we can do for you so that we can be sure you are leaving completely satisfied with your care experience.                Follow-ups after your visit        Additional Services     PAC Visit Referral (For [facility] Only)       Does this visit require an Anesthesia consult?  Yes - Evaluate for medical necessity related to one of the following conditions:  Management of CAD    H&P done by:  N/A and PCP      Please be aware that coverage of these services is subject to the terms and limitations of your health insurance plan.  Call member services at your health plan with any benefit or coverage questions.      Please bring the following to your appointment:  >>   Any x-rays, CTs or MRIs which have been performed.  Contact the facility where they were done to arrange for  prior to your scheduled appointment.  Any new CT, MRI or other procedures ordered by your specialist must be performed at a [facility] facility or coordinated by your clinic's referral office.    >>   List of current medications  >>   This referral request   >>   Any documents/labs given to you for this referral                  Follow-up notes from your care team     Return if symptoms worsen or fail to improve.      Your next 10 appointments  already scheduled     Apr 04, 2017  8:00 AM CDT   Office Visit with Katie Gross MD   [facility] ([facility])    [address] [facility] [id number] [phone]           Bring a current list of meds and any records pertaining to this visit.  For Physicals, please bring immunization records and any forms needing to be filled out.  Please arrive 10 minutes early to complete paperwork.            May 09, 2017  9:00 AM CDT   (Arrive by 8:45 AM)   PAC RN ASSESSMENT with  Pac Rn   [facility] ([facility])    [address] [place] [address]  [address] [facility] [address]   [phone]            May 09, 2017  9:30 AM CDT   (Arrive by 9:15 AM)   PAC EVALUATION with  Pac Mya 2   [facility] ([facility])    [address] [place] [address]  [address] [facility] [address]   [phone]            May 09, 2017 10:30 AM CDT   (Arrive by 10:15 AM)   PAC Anesthesia Consult with  Pac Anesthesiologist   [facility] ([facility])    [address] [place] [address]  [address] [facility] [address]   [phone]            May 23, 2017   Procedure with Marcio Aggarwal MD   [facility] ([facility])    [address] [place] [address]  [address] [facility] [address]   [phone]           Located in the Clinics and Surgery Center at [address] [place] [address].   parking is very convenient and highly recommended.  is a $6 flat rate fee.  Both  and self parkers should enter the main arrival plaza from [facility]; parking attendants will direct you based on your parking preference.            Jun 22, 2017  8:40 AM CDT   (Arrive by 8:25 AM)   Post-Op with Marcio Aggarwal MD   [facility]rolog[facility] ([facility]  [place])    [address] [facility]  [address] [facility] [address] [phone]            Jul 31, 2017  8:00 AM CDT   CT CHEST W CONTRAST with [email or web address]   [facility] ([facility])    [address] [facility] [address]   [phone]           Please bring any scans or X-rays taken at other hospitals, if similar tests were done. Also bring a list of your medicines, including vitamins, minerals and over-the-counter drugs. It is safest to leave personal items at home.  Be sure to tell your doctor:   If you have any allergies.   If there s any chance you are pregnant.   If you are breastfeeding.   If you have any special needs.  You will have contrast for this exam. To prepare:   Do not eat or drink for 2 hours before your exam. If you need to take medicine, you may take it with small sips of water. (We may ask you to take liquid medicine as well.)   The day before your exam, drink extra fluids at least six 8-ounce glasses (unless your doctor tells you to restrict your fluids).  Patients over 70 or patients with diabetes or kidney problems:   If you haven t had a blood test (creatinine test) within the last 30 days, go to your clinic or Diagnostic Imaging Department for this test.  If you have diabetes:   If your kidney function is normal, continue taking your metformin (Avandamet, Glucophage, Glucovance, Metaglip) on the day of your exam.   If your kidney function is abnormal, wait 48 hours before restarting this medicine.  Please wear loose clothing, such as a sweat suit or jogging clothes. Avoid snaps, zippers and other metal. We may ask you to undress and put on a hospital gown.  If you have any questions, please call the Imaging Department where you will have your exam.            Jul 31, 2017  8:30 AM CDT   CT SOFT TISSUE NECK W CONTRAST with WYCT1   [facility] CT ([facility])    [address] [facility] [address]   [phone]           Please  bring any scans or X-rays taken at other hospitals, if similar tests were done. Also bring a list of your medicines, including vitamins, minerals and over-the-counter drugs. It is safest to leave personal items at home.  Be sure to tell your doctor:   If you have any allergies.   If there s any chance you are pregnant.   If you are breastfeeding.   If you have any special needs.  You will have contrast for this exam. To prepare:   Do not eat or drink for 2 hours before your exam. If you need to take medicine, you may take it with small sips of water. (We may ask you to take liquid medicine as well.)   The day before your exam, drink extra fluids at least six 8-ounce glasses (unless your doctor tells you to restrict your fluids).  Patients over 70 or patients with diabetes or kidney problems:   If you haven t had a blood test (creatinine test) within the last 30 days, go to your clinic or Diagnostic Imaging Department for this test.  If you have diabetes:   If your kidney function is normal, continue taking your metformin (Avandamet, Glucophage, Glucovance, Metaglip) on the day of your exam.   If your kidney function is abnormal, wait 48 hours before restarting this medicine.  Please wear loose clothing, such as a sweat suit or jogging clothes. Avoid snaps, zippers and other metal. We may ask you to undress and put on a hospital gown.  If you have any questions, please call the Imaging Department where you will have your exam.            Aug 02, 2017 10:00 AM CDT   (Arrive by 9:45 AM)   RETURN TUMOR VISIT with Gerson Ravi MD   [facility] ([facility])    [address] [place] [address] [phone]              Who to contact     Please call your clinic at [phone] to:    Ask questions about your health    Make or cancel appointments    Discuss your medicines    Learn about your test results    Speak to your doctor   If you have compliments or  "concerns about an experience at your clinic, or if you wish to file a complaint, please contact [facility] Patient Relations at [phone] or email us at [email or web address]         Additional Information About Your Visit        MyChart Information     Frank[facility] Chathart gives you secure access to your electronic health record. If you see a primary care provider, you can also send messages to your care team and make appointments. If you have questions, please call your primary care clinic.  If you do not have a primary care provider, please call [phone] and they will assist you.      Wordlock is an electronic gateway that provides easy, online access to your medical records. With Wordlock, you can request a clinic appointment, read your test results, renew a prescription or communicate with your care team.     To access your existing account, please contact your [facility] Clinic or call [phone] for assistance.        Care EveryWhere ID     This is your Care EveryWhere ID. This could be used by other organizations to access your [facility] medical records  [id number]        Your Vitals Were     Pulse Height BMI (Body Mass Index)             66 1.74 m (5' 8.5\") 29.22 kg/m2          Blood Pressure from Last 3 Encounters:   03/10/17 150/76   02/28/17 144/84   01/31/17 154/75    Weight from Last 3 Encounters:   03/10/17 88.5 kg (195 lb)   02/08/17 88.5 kg (195 lb)   01/31/17 85.7 kg (189 lb)              We Performed the Following     Left varicocele repair     PAC Visit Referral (For [facility] Only)        Primary Care Provider Office Phone # Fax #    Katie Gross [phone]       [facility] [address] [place] [phone]        Thank you!     Thank you for choosing [facility] AND [facility] PROSTATE AND UROLOGIC CANCERS  for your care. Our goal is always to provide you with excellent care. Hearing back from our " patients is one way we can continue to improve our services. Please take a few minutes to complete the written survey that you may receive in the mail after your visit with us. Thank you!             Your Updated Medication List - Protect others around you: Learn how to safely use, store and throw away your medicines at www.disposemymeds.org.          This list is accurate as of: 3/10/17 11:59 PM.  Always use your most recent med list.                   Brand Name Dispense Instructions for use    aspirin 81 MG tablet      Take 81 mg by mouth daily Reported on 2/28/2017       atenolol 100 MG tablet    TENORMIN    90 tablet    Take 1 tablet (100 mg) by mouth daily       blood glucose lancets standard    no brand specified    1 Box    Use to test blood sugar two times daily       blood glucose monitoring test strip    no brand specified    100 each    Use to test blood sugar two times daily       metFORMIN 750 MG 24 hr tablet    GLUCOPHAGE-XR    180 tablet    Take 2 tablets (1,500 mg) by mouth daily       multivitamin, therapeutic with minerals Tabs tablet     30 each    Take 1 tablet by mouth daily.       tamsulosin 0.4 MG capsule    FLOMAX    90 capsule    Take 1 capsule (0.4 mg) by mouth daily

## 2017-03-10 NOTE — PROGRESS NOTES
Pre Op Teaching Flowsheet       Pre and Post op Patient Education  Relevant Diagnosis:  Testicular pain  Surgical procedure:  Left varicocele repair, denervation of left testis.  Teaching Topic:  Pre and post op teaching  Person Involved in teaching: Antoine Russo    Motivation Level:  Asks Questions: Yes  Eager to Learn:  Yes  Cooperative: Yes  Receptive (willing/able to accept information):  Yes    Patient demonstrates understanding of the following:  Date of surgery:  5/16/17  Location of surgery:  [facility]- 5th Floor  History and Physical and any other testing necessary prior to surgery: Yes  Required time line for completion of History and Physical and any pre-op testing: Yes    Patient demonstrates understanding of the following:  Pre-op bowel prep:  N/A  Pre-op showering/scrub information with PCMX Soap: Yes  Blood thinner medications discussed and when to stop (if applicable):  Yes      Infection Prevention:   Patient demonstrates understanding of the following:  Surgical procedure site care taught: Yes  Signs and symptoms of infection taught:  Yes      Post-op follow-up:  Discussed how to contact the hospital, nurse, and clinic scheduling staff if necessary.    Instructional materials used/given/mailed:  [facility] Booklet, post op teaching sheet, Map, Soap, and arrival/location information.    Surgical instructions packet given to patient in office:  Yes.  Pac questions -negative

## 2017-03-10 NOTE — PATIENT INSTRUCTIONS
Schedule surgery with Dr Aggarwal    It was a pleasure meeting with you today.  Thank you for allowing me and my team the privilege of caring for you today.  YOU are the reason we are here, and I truly hope we provided you with the excellent service you deserve.  Please let us know if there is anything else we can do for you so that we can be sure you are leaving completely satisfied with your care experience.

## 2017-03-11 NOTE — PROGRESS NOTES
Pt follow-up from Dr. Morales.    Left scrotal pain since 12/2016.  Tried antibiotics initially for epididymitis.  ibu helped at first but now pain recurred.  Discomfort radiates to mid left abdomen.  Pain worse if he lays down/ pinched between legs.    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  After this, started with the left testis pain.    On exam today, left grade III varicocele.  Testes ~16cc bilateral.    A  Left grade III varicocele.  Left testis pain.    Plan  Chronic pain in left scrotum. Options I discussed with him today were:  1.  Observation  2. Conservative management with sitz bath, nsaids, repeat trial abx.  3. Left varicocele repair/ microscopic denervation of the spermatic cord ( estimate ~80-90% chance pain improvement, 80+% chance pain cure in my personal experience).  4. Orchiectomy, estimate 95% pain cure, but could have phantom pain. Would also loose endocrine function of the testis.    He would like to proceed with varicocele repair.  Will also make this a denervation, basically the same operation as long as we're repairing the large left varicocele.    Discussed risks, benefits, and alternatives of varix repair, including various methods.  I counseled him extensively on the nature of varicoceles, and their possible effects on testosterone production and fertility.  I described surgery and embolization approaches, and the detailed risks of surgical repair.  These include damage to artery (ischemia), damage to lymphatics ( hydrocele), as well as risk of recurrence (</= 1%). Discussed an estimated 75+% chance of pain relief with varicocele repair, more with [facility].    He will schedule at his convenience.  25min visit, over 50% face to face in counseling/discussion of above issues.

## 2017-04-04 ENCOUNTER — OFFICE VISIT (OUTPATIENT)
Dept: FAMILY MEDICINE | Facility: CLINIC | Age: 72
End: 2017-04-04
Payer: COMMERCIAL

## 2017-04-04 VITALS
TEMPERATURE: 97.9 F | SYSTOLIC BLOOD PRESSURE: 139 MMHG | HEART RATE: 64 BPM | BODY MASS INDEX: 28.92 KG/M2 | WEIGHT: 193 LBS | DIASTOLIC BLOOD PRESSURE: 80 MMHG

## 2017-04-04 DIAGNOSIS — I86.1 LEFT VARICOCELE: ICD-10-CM

## 2017-04-04 DIAGNOSIS — I10 HYPERTENSION, BENIGN ESSENTIAL, GOAL BELOW 140/90: ICD-10-CM

## 2017-04-04 DIAGNOSIS — N40.0 HYPERTROPHY OF PROSTATE WITHOUT URINARY OBSTRUCTION: ICD-10-CM

## 2017-04-04 DIAGNOSIS — E11.42 TYPE 2 DIABETES MELLITUS WITH DIABETIC POLYNEUROPATHY, WITHOUT LONG-TERM CURRENT USE OF INSULIN (H): Primary | ICD-10-CM

## 2017-04-04 LAB — HBA1C MFR BLD: 8.9 % (ref 4.3–6)

## 2017-04-04 PROCEDURE — 99214 OFFICE O/P EST MOD 30 MIN: CPT | Performed by: FAMILY MEDICINE

## 2017-04-04 PROCEDURE — 36415 COLL VENOUS BLD VENIPUNCTURE: CPT | Performed by: FAMILY MEDICINE

## 2017-04-04 PROCEDURE — 83036 HEMOGLOBIN GLYCOSYLATED A1C: CPT | Performed by: FAMILY MEDICINE

## 2017-04-04 RX ORDER — GLIPIZIDE 2.5 MG/1
2.5 TABLET, EXTENDED RELEASE ORAL DAILY
Qty: 90 TABLET | Refills: 1 | Status: SHIPPED | OUTPATIENT
Start: 2017-04-04 | End: 2017-10-10

## 2017-04-04 RX ORDER — METFORMIN HYDROCHLORIDE 750 MG/1
1500 TABLET, EXTENDED RELEASE ORAL DAILY
Qty: 180 TABLET | Refills: 3 | Status: SHIPPED | OUTPATIENT
Start: 2017-04-04 | End: 2018-01-30

## 2017-04-04 RX ORDER — ATENOLOL 100 MG/1
100 TABLET ORAL DAILY
Qty: 90 TABLET | Refills: 3 | Status: SHIPPED | OUTPATIENT
Start: 2017-04-04 | End: 2017-08-22

## 2017-04-04 NOTE — PROGRESS NOTES
SUBJECTIVE:                                                    Antoine Russo is a 71 year old male who presents to clinic today for the following health issues:    Hypertension Follow-up      Outpatient blood pressures are not being checked.    Low Salt Diet: not monitoring salt     BP Readings from Last 6 Encounters:   04/04/17 139/80   03/10/17 150/76   02/28/17 144/84   01/31/17 154/75   01/03/17 132/74   07/15/16 118/72   Atenolol 100 mg  Tolerating medications well without significant side effects      Amount of exercise or physical activity: None    Problems taking medications regularly: No    Medication side effects: none    Diet: regular (no restrictions)      Diabetes Follow-up      Patient is checking blood sugars: not at all    Diabetic concerns: None     Symptoms of hypoglycemia (low blood sugar): none     Paresthesias (numbness or burning in feet) or sores: Yes      Date of last diabetic eye exam: 2015     Lab Results   Component Value Date    A1C 8.0 01/03/2017    A1C 6.6 07/15/2016    A1C 7.8 04/04/2016    A1C 6.9 03/14/2016    A1C 6.9 03/12/2016   Metformin 1500 mg      Left varicocele-  On May 23 he has an orchiectomy for Left grade III varicocele with Dr. Aggarwal as well as a denervation.     BPH  Flomax is working well    Problem list and histories reviewed & adjusted, as indicated.  Additional history: as documented    BP Readings from Last 3 Encounters:   04/04/17 139/80   03/10/17 150/76   02/28/17 144/84    Wt Readings from Last 3 Encounters:   04/04/17 87.5 kg (193 lb)   03/10/17 88.5 kg (195 lb)   02/08/17 88.5 kg (195 lb)           Reviewed and updated as needed this visit by clinical staff  Tobacco  Allergies  Problems  Med Hx  Surg Hx  Fam Hx  Soc Hx      Reviewed and updated as needed this visit by Provider  Allergies  Problems         ROS:  C: NEGATIVE for fever, chills, change in weight  E/M: NEGATIVE for ear, mouth and throat problems  R: NEGATIVE for significant cough or  SOB  CV: NEGATIVE for chest pain, palpitations or peripheral edema  GI: NEGATIVE for nausea, abdominal pain, heartburn, or change in bowel habits  : negative for dysuria, hematuria, decreased urinary stream    OBJECTIVE:                                                    /80 (BP Location: Right arm)  Pulse 64  Temp 97.9  F (36.6  C) (Tympanic)  Wt 87.5 kg (193 lb)  BMI 28.92 kg/m2  Body mass index is 28.92 kg/(m^2).  GENERAL: healthy, alert and no distress  HENT: ear canals and TM's normal, nose and mouth without ulcers or lesions  NECK: no adenopathy, no asymmetry, masses, or scars and thyroid normal to palpation  RESP: lungs clear to auscultation - no rales, rhonchi or wheezes  CV: regular rate and rhythm, normal S1 S2, no S3 or S4, no murmur, click or rub, no peripheral edema  ABDOMEN: soft, nontender, no hepatosplenomegaly, no masses, fairly prominent midline scar, oblique left scar, several other smaller scars.  MS: no gross musculoskeletal defects noted, no edema      Diagnostic Test Results:  Results for orders placed or performed in visit on 04/04/17 (from the past 24 hour(s))   Hemoglobin A1c   Result Value Ref Range    Hemoglobin A1C 8.9 (H) 4.3 - 6.0 %        ASSESSMENT/PLAN:                                                    Antoine was seen today for hypertension and diabetes.    Diagnoses and all orders for this visit:    Type 2 diabetes mellitus with diabetic polyneuropathy, without long-term current use of insulin (H)  -     metFORMIN (GLUCOPHAGE-XR) 750 MG 24 hr tablet; Take 2 tablets (1,500 mg) by mouth daily  -     Hemoglobin A1c  -     glipiZIDE (GLIPIZIDE XL) 2.5 MG 24 hr tablet; Take 1 tablet (2.5 mg) by mouth daily    Hypertension, benign essential, goal below 140/90  -     atenolol (TENORMIN) 100 MG tablet; Take 1 tablet (100 mg) by mouth daily    Hypertrophy of prostate without urinary obstruction  Patient is on Flomax    Left varicocele  Patient is followed by urology.    Antoine  "does not want to be on a statin as \"they get a bad review.\" He also had a friend who was on a statin and  and now Antoine doesn't want to go on one. I discussed the benefits of being on a statin and all the risk factors that he currently has, but he still declined.   Started Glipizide in hopes of getting his A1C back under control. He is not testing his blood sugars at this time.       Patient Instructions   Start your new diabetes medicine-Glipizide. Watch for low blood sugars. Once a day    See you in 3 months      This document serves as a record of the services and decisions personally performed and made by Katie Gorss MD. It was created on her behalf by Ana Pereira, a trained medical scribe. The creation of this document is based the provider's statements to the medical scribe.  Ana Pereira 8:13 AM 2017    Provider:   The information in this document, created by the medical scribe for me, accurately reflects the services I personally performed and the decisions made by me. I have reviewed and approved this document for accuracy prior to leaving the patient care area.  Katie Gross MD 8:13 AM 2017    Katie Gross MD  [facility]  "

## 2017-04-04 NOTE — NURSING NOTE
"Chief Complaint   Patient presents with     Hypertension     recheck       Initial /80 (BP Location: Right arm)  Pulse 64  Temp 97.9  F (36.6  C) (Tympanic)  Wt 193 lb (87.5 kg)  BMI 28.92 kg/m2 Estimated body mass index is 28.92 kg/(m^2) as calculated from the following:    Height as of 3/10/17: 5' 8.5\" (1.74 m).    Weight as of this encounter: 193 lb (87.5 kg).  Medication Reconciliation: complete    Health Maintenance that is potentially due pending provider review:  NONE    n/a    "

## 2017-04-04 NOTE — PATIENT INSTRUCTIONS
Start your new diabetes medicine-Glipizide. Watch for low blood sugars. Once a day    See you in 3 months

## 2017-04-04 NOTE — MR AVS SNAPSHOT
After Visit Summary   4/4/2017    Antoine Russo    MRN: [id number]           Patient Information     Date Of Birth          1945        Visit Information        Provider Department      4/4/2017 8:00 AM Katie Gross MD [facility]        Today's Diagnoses     Type 2 diabetes mellitus with diabetic polyneuropathy, without long-term current use of insulin (H)    -  1    Hypertension, benign essential, goal below 140/90        Hypertrophy of prostate without urinary obstruction        Left varicocele          Care Instructions    Start your new diabetes medicine-Glipizide. Watch for low blood sugars. Once a day    See you in 3 months        Follow-ups after your visit        Your next 10 appointments already scheduled     May 09, 2017  9:00 AM CDT   (Arrive by 8:45 AM)   PAC RN ASSESSMENT with Uc Pac Rn   [facility] ([facility])    [address] [place] [address]  4th [facility] [address]   [phone]            May 09, 2017  9:30 AM CDT   (Arrive by 9:15 AM)   PAC EVALUATION with  Pac Mya 2   [facility] ([facility])    [address] [place] [address]  4th [facility] [address]   [phone]            May 09, 2017 10:30 AM CDT   (Arrive by 10:15 AM)   PAC Anesthesia Consult with  Pac Anesthesiologist   [facility] ([facility])    [address] [place] [address]   [phone]            May 23, 2017   Procedure with Marcio Aggarwal MD   [facility] ([facility])    [address] [place] [address]  5th [facility] [address]   [phone]           Located in the Clinics and Surgery Center at [address] [place] [address].   parking is very convenient and highly recommended.  is a $6 flat rate fee.  Both   and self parkers should enter the main arrival plaza from [facility]; parking attendants will direct you based on your parking preference.            Jun 22, 2017  8:40 AM CDT   (Arrive by 8:25 AM)   Post-Op with Marcio Aggarwal MD   [facility]rologic C[facility] ([facility])    [address] [facility] [address]  4th Floor  [facility] [address] [phone]            Jul 31, 2017  8:00 AM CDT   CT CHEST W CONTRAST with [email or web address]   [facility] ([facility])    [address] [facility] [address] [phone]           Please bring any scans or X-rays taken at other hospitals, if similar tests were done. Also bring a list of your medicines, including vitamins, minerals and over-the-counter drugs. It is safest to leave personal items at home.  Be sure to tell your doctor:   If you have any allergies.   If there s any chance you are pregnant.   If you are breastfeeding.   If you have any special needs.  You will have contrast for this exam. To prepare:   Do not eat or drink for 2 hours before your exam. If you need to take medicine, you may take it with small sips of water. (We may ask you to take liquid medicine as well.)   The day before your exam, drink extra fluids at least six 8-ounce glasses (unless your doctor tells you to restrict your fluids).  Patients over 70 or patients with diabetes or kidney problems:   If you haven t had a blood test (creatinine test) within the last 30 days, go to your clinic or Diagnostic Imaging Department for this test.  If you have diabetes:   If your kidney function is normal, continue taking your metformin (Avandamet, Glucophage, Glucovance, Metaglip) on the day of your exam.   If your kidney function is abnormal, wait 48 hours before restarting this medicine.  Please wear loose clothing, such as a sweat suit or jogging clothes. Avoid snaps, zippers and other metal. We may ask you to undress  and put on a hospital gown.  If you have any questions, please call the Imaging Department where you will have your exam.            Jul 31, 2017  8:30 AM CDT   CT SOFT TISSUE NECK W CONTRAST with WYCT1   [facility] CT ([facility])    [address] [facility] [address]   [phone]           Please bring any scans or X-rays taken at other hospitals, if similar tests were done. Also bring a list of your medicines, including vitamins, minerals and over-the-counter drugs. It is safest to leave personal items at home.  Be sure to tell your doctor:   If you have any allergies.   If there s any chance you are pregnant.   If you are breastfeeding.   If you have any special needs.  You will have contrast for this exam. To prepare:   Do not eat or drink for 2 hours before your exam. If you need to take medicine, you may take it with small sips of water. (We may ask you to take liquid medicine as well.)   The day before your exam, drink extra fluids at least six 8-ounce glasses (unless your doctor tells you to restrict your fluids).  Patients over 70 or patients with diabetes or kidney problems:   If you haven t had a blood test (creatinine test) within the last 30 days, go to your clinic or Diagnostic Imaging Department for this test.  If you have diabetes:   If your kidney function is normal, continue taking your metformin (Avandamet, Glucophage, Glucovance, Metaglip) on the day of your exam.   If your kidney function is abnormal, wait 48 hours before restarting this medicine.  Please wear loose clothing, such as a sweat suit or jogging clothes. Avoid snaps, zippers and other metal. We may ask you to undress and put on a hospital gown.  If you have any questions, please call the Imaging Department where you will have your exam.            Aug 02, 2017 10:00 AM CDT   (Arrive by 9:45 AM)   RETURN TUMOR VISIT with Gerson Ravi MD   [facility]  [place])    [address] [facility] [address]  4th [facility] [address] [phone]              Who to contact     If you have questions or need follow up information about today's clinic visit or your schedule please contact [facility] directly at [phone].  Normal or non-critical lab and imaging results will be communicated to you by MyChart, letter or phone within 4 business days after the clinic has received the results. If you do not hear from us within 7 days, please contact the clinic through [employer] or phone. If you have a critical or abnormal lab result, we will notify you by phone as soon as possible.  Submit refill requests through [employer] or call your pharmacy and they will forward the refill request to us. Please allow 3 business days for your refill to be completed.          Additional Information About Your Visit        MyChart Information     [employer] gives you secure access to your electronic health record. If you see a primary care provider, you can also send messages to your care team and make appointments. If you have questions, please call your primary care clinic.  If you do not have a primary care provider, please call [phone] and they will assist you.        Care EveryWhere ID     This is your Care EveryWhere ID. This could be used by other organizations to access your [facility] medical records  [id number]        Your Vitals Were     Pulse Temperature BMI (Body Mass Index)             64 97.9  F (36.6  C) (Tympanic) 28.92 kg/m2          Blood Pressure from Last 3 Encounters:   04/04/17 139/80   03/10/17 150/76   02/28/17 144/84    Weight from Last 3 Encounters:   04/04/17 193 lb (87.5 kg)   03/10/17 195 lb (88.5 kg)   02/08/17 195 lb (88.5 kg)              We Performed the Following     Hemoglobin A1c          Today's Medication Changes          These changes are accurate as of: 4/4/17  8:49 AM.  If you have any questions, ask your nurse or doctor.                Start taking these medicines.        Dose/Directions    glipiZIDE 2.5 MG 24 hr tablet   Commonly known as:  glipiZIDE XL   Used for:  Type 2 diabetes mellitus with diabetic polyneuropathy, without long-term current use of insulin (H)   Started by:  Katie Gross MD        Dose:  2.5 mg   Take 1 tablet (2.5 mg) by mouth daily   Quantity:  90 tablet   Refills:  1            Where to get your medicines      [facility] [address] [facility] [facility]  [address] [facility] [address]     Phone:  [phone]     atenolol 100 MG tablet    glipiZIDE 2.5 MG 24 hr tablet    metFORMIN 750 MG 24 hr tablet                Primary Care Provider Office Phone # Fax #    Katie Gross [phone]       [facility] [address] [facility] [address]        Thank you!     Thank you for choosing [facility]  for your care. Our goal is always to provide you with excellent care. Hearing back from our patients is one way we can continue to improve our services. Please take a few minutes to complete the written survey that you may receive in the mail after your visit with us. Thank you!             Your Updated Medication List - Protect others around you: Learn how to safely use, store and throw away your medicines at www.disposemymeds.org.          This list is accurate as of: 4/4/17  8:49 AM.  Always use your most recent med list.                   Brand Name Dispense Instructions for use    aspirin 81 MG tablet      Take 81 mg by mouth daily Reported on 2/28/2017       atenolol 100 MG tablet    TENORMIN    90 tablet    Take 1 tablet (100 mg) by mouth daily       blood glucose lancets standard    no brand specified    1 Box    Use to test blood sugar two times daily       blood glucose monitoring test strip    no brand specified    100 each    Use to test blood sugar two times daily       glipiZIDE 2.5 MG 24 hr tablet    glipiZIDE  XL    90 tablet    Take 1 tablet (2.5 mg) by mouth daily       metFORMIN 750 MG 24 hr tablet    GLUCOPHAGE-XR    180 tablet    Take 2 tablets (1,500 mg) by mouth daily       multivitamin, therapeutic with minerals Tabs tablet     30 each    Take 1 tablet by mouth daily.       tamsulosin 0.4 MG capsule    FLOMAX    90 capsule    Take 1 capsule (0.4 mg) by mouth daily

## 2017-04-13 ENCOUNTER — TRANSFERRED RECORDS (OUTPATIENT)
Dept: HEALTH INFORMATION MANAGEMENT | Facility: CLINIC | Age: 72
End: 2017-04-13

## 2017-05-09 ENCOUNTER — ALLIED HEALTH/NURSE VISIT (OUTPATIENT)
Dept: SURGERY | Facility: CLINIC | Age: 72
End: 2017-05-09

## 2017-05-09 ENCOUNTER — ANESTHESIA EVENT (OUTPATIENT)
Dept: SURGERY | Facility: AMBULATORY SURGERY CENTER | Age: 72
End: 2017-05-09

## 2017-05-09 ENCOUNTER — OFFICE VISIT (OUTPATIENT)
Dept: SURGERY | Facility: CLINIC | Age: 72
End: 2017-05-09

## 2017-05-09 VITALS
HEART RATE: 65 BPM | BODY MASS INDEX: 28.62 KG/M2 | OXYGEN SATURATION: 94 % | WEIGHT: 199.9 LBS | HEIGHT: 70 IN | TEMPERATURE: 98.2 F | DIASTOLIC BLOOD PRESSURE: 87 MMHG | RESPIRATION RATE: 16 BRPM | SYSTOLIC BLOOD PRESSURE: 163 MMHG

## 2017-05-09 DIAGNOSIS — Z12.5 ENCOUNTER FOR SCREENING FOR MALIGNANT NEOPLASM OF PROSTATE: ICD-10-CM

## 2017-05-09 DIAGNOSIS — Z01.818 PREOP GENERAL PHYSICAL EXAM: Primary | ICD-10-CM

## 2017-05-09 DIAGNOSIS — Z01.818 PREOP GENERAL PHYSICAL EXAM: ICD-10-CM

## 2017-05-09 DIAGNOSIS — R39.15 URGENCY OF URINATION: ICD-10-CM

## 2017-05-09 LAB
ALBUMIN UR-MCNC: NEGATIVE MG/DL
ANION GAP SERPL CALCULATED.3IONS-SCNC: 8 MMOL/L (ref 3–14)
APPEARANCE UR: ABNORMAL
BACTERIA #/AREA URNS HPF: ABNORMAL /HPF
BILIRUB UR QL STRIP: NEGATIVE
BUN SERPL-MCNC: 13 MG/DL (ref 7–30)
CALCIUM SERPL-MCNC: 9.6 MG/DL (ref 8.5–10.1)
CHLORIDE SERPL-SCNC: 100 MMOL/L (ref 94–109)
CO2 SERPL-SCNC: 30 MMOL/L (ref 20–32)
COLOR UR AUTO: YELLOW
CREAT SERPL-MCNC: 0.77 MG/DL (ref 0.66–1.25)
ERYTHROCYTE [DISTWIDTH] IN BLOOD BY AUTOMATED COUNT: 15.5 % (ref 10–15)
GFR SERPL CREATININE-BSD FRML MDRD: ABNORMAL ML/MIN/1.7M2
GLUCOSE SERPL-MCNC: 140 MG/DL (ref 70–99)
GLUCOSE UR STRIP-MCNC: NEGATIVE MG/DL
HCT VFR BLD AUTO: 46.4 % (ref 40–53)
HGB BLD-MCNC: 14.8 G/DL (ref 13.3–17.7)
HGB UR QL STRIP: NEGATIVE
KETONES UR STRIP-MCNC: NEGATIVE MG/DL
LEUKOCYTE ESTERASE UR QL STRIP: ABNORMAL
MCH RBC QN AUTO: 31.5 PG (ref 26.5–33)
MCHC RBC AUTO-ENTMCNC: 31.9 G/DL (ref 31.5–36.5)
MCV RBC AUTO: 99 FL (ref 78–100)
NITRATE UR QL: POSITIVE
PH UR STRIP: 5 PH (ref 5–7)
PLATELET # BLD AUTO: 315 10E9/L (ref 150–450)
POTASSIUM SERPL-SCNC: 4.3 MMOL/L (ref 3.4–5.3)
PSA SERPL-ACNC: 1.72 UG/L (ref 0–4)
RBC # BLD AUTO: 4.7 10E12/L (ref 4.4–5.9)
RBC #/AREA URNS AUTO: 4 /HPF (ref 0–2)
SODIUM SERPL-SCNC: 138 MMOL/L (ref 133–144)
SP GR UR STRIP: 1.01 (ref 1–1.03)
URN SPEC COLLECT METH UR: ABNORMAL
UROBILINOGEN UR STRIP-MCNC: 0 MG/DL (ref 0–2)
WBC # BLD AUTO: 10.6 10E9/L (ref 4–11)
WBC #/AREA URNS AUTO: 53 /HPF (ref 0–2)
WBC CLUMPS #/AREA URNS HPF: PRESENT /HPF

## 2017-05-09 ASSESSMENT — LIFESTYLE VARIABLES: TOBACCO_USE: 1

## 2017-05-09 NOTE — PATIENT INSTRUCTIONS
Preparing for Your Surgery      Name:  Antoine Russo   MRN:  [id number]   :  1945   Today's Date:  2017     Arriving for surgery:  Surgery date:  17  Surgery time:  12:20 pm  Arrival time:  10:45 am    Please come to:  [facility]  [address] [place] [address]     Parking is available in front of the [facility] building from 5:30AM to 8:00PM.  -  Proceed to the 5th floor to check into the Ambulatory Surgery Center.              >> There will be patient concierges on the 1st and 5th floor, for assistance or an escort, if you would like.              >> Please call [phone] with any questions.  -  Bring your ID and insurance card.    What can I eat or drink?  -  You may have solid food or milk products until 8 hours prior to your surgery. (4 am)  -  You may have water, apple juice, BLACK coffee (NO creamer or nondairy creamer), or 7up/Sprite until 2 hours prior to your surgery. (10 am)    Which medicines can I take?  -Do not bring your own medications to the hospital.        -  Stop Multivitamin 1 week prior to surgery.        -  Stop Aspirin 5 days prior to surgery.    -  Do NOT take these medications in the morning, the day of surgery:  Metformin, Glipizide,     -  Please take these medications the day of surgery:  Atenolol, Tamsulosin  Acetaminophen (Tylenol) if needed    How do I prepare myself?  -  Take two showers: one the night before surgery; and one the morning of surgery.         Use Scrubcare or Hibiclens to wash from neck down.  You may use your own shampoo and conditioner. No other hair products.   -  Do NOT use lotion, powder, deodorant, or antiperspirant the day of your surgery.  -  Do NOT wear any jewelry.    Questions or Concerns:  If you have questions or concerns, please call the  Preoperative Assessment Center, Monday-Friday 7AM-7PM:  [phone]    AFTER YOUR SURGERY  Breathing exercises   Breathing exercises  help you recover faster. Take deep breaths and let the air out slowly. This will:     Help you wake up after surgery.    Help prevent complications like pneumonia.  Preventing complications will help you go home sooner.   Nausea and vomiting   You may feel sick to your stomach after surgery; if so, let your nurse know.    Pain control:  After surgery, you may have pain. Our goal is to help you manage your pain. Pain medicine will help you feel comfortable enough to do activities that will help you heal.  These activities may include breathing exercises, walking and physical therapy.   To help your health care team treat your pain we will ask: 1) If you have pain  2) where it is located 3) describe your pain in your words  Methods of pain control include medications given by mouth, vein or by nerve block for some surgeries.  Sequential Compression Device (SCD) or Pneumo Boots:  You may need to wear SCD S on your legs or feet. These are wraps connected to a machine that pumps in air and releases it. The repeated pumping helps prevent blood clots from forming.

## 2017-05-09 NOTE — ANESTHESIA PREPROCEDURE EVALUATION
Anesthesia Evaluation     . Pt has had prior anesthetic. Type: General and MAC    History of anesthetic complications   - difficult intubation        ROS/MED HX    ENT/Pulmonary:     (+)tobacco use, Past use 1 PPD for 40 years, quit 2006 packs/day  , . .    Neurologic:     (+)neuropathy - feet,     Cardiovascular:     (+) hypertension--CAD, --. : . . . :. . Previous cardiac testing date:results:date: results:ECG reviewed date:5/9/2017 results:NSR, LVH date: results:          METS/Exercise Tolerance:  >4 METS   Hematologic:  - neg hematologic  ROS       Musculoskeletal:  - neg musculoskeletal ROS       GI/Hepatic:     (+) Other GI/Hepatic S/P panceatectomy, splenectomy, bowel resection      Renal/Genitourinary:  - ROS Renal section negative       Endo:     (+) type II DM Last HgA1c: 8.9 date: 4/4/2017 Not using insulin - not using insulin pump Previously admitted for DM/DKA Diabetic complications: neuropathy, Obesity, .      Psychiatric:  - neg psychiatric ROS       Infectious Disease: Comment: H/o c diff colitis - neg infectious disease ROS       Malignancy:   (+) Malignancy History of Other  Other CA oral and pancreas status post Surgery Oral tongue CA s/p glossectomy, B neck dissection        Other:    (+) No chance of pregnancy C-spine cleared: N/A, no H/O Chronic Pain,no other significant disability                    Physical Exam  Normal systems: cardiovascular, pulmonary and dental    Airway   Mallampati: III  TM distance: >3 FB  Neck ROM: limited  Comment: Not able to do tongue protrusion, h/o tongue cancer surgery.     Dental   (+) missing    Cardiovascular   Rhythm and rate: regular and normal      Pulmonary    breath sounds clear to auscultation    Other findings:   Echo 6/3/2013  Interpretation Summary  1. Borderline LVH. Normal LV systolic function. Stage II diastolic   dysfunction  2.  Normal RV size and function  3. No significant valvular   abnormalities       Left Ventricle  Borderline left  ventricular hypertrophy. Normal LV chamber size, wall motion,   and systolic function with estimated ejection fraction 60%.  Left ventricular Doppler filling pattern consistent with pseudonormal filling.     Right Ventricle  The right ventricle is normal size.  Global right ventricular function is normal.     Atria  Mild to moderate left atrial enlargement is present.  Mild right atrial enlargement is present.     Mitral Valve  Focally thickened leaflets without stenosis. Minimal regurgitation.     Aortic Valve  Grossly normal trileaflet aortic valve without stenosis or regurgitation.     Tricuspid Valve  Grossly normal valve without stenosis. Minimal regurgitation. RVSP 29 mmHg   based on RAP 5 mmHg.     Pulmonic Valve  Grossly normal valve without stenosis or regurgitation.     Vessels  The aorta root is normal.  The inferior vena cava was normal in size with preserved respiratory   variability.     Pericardium  No pericardial effusion is present    Lab Results      Component                Value               Date                      WBC                      10.6                05/09/2017            Lab Results      Component                Value               Date                      RBC                      4.70                05/09/2017            Lab Results      Component                Value               Date                      HGB                      14.8                05/09/2017            Lab Results      Component                Value               Date                      HCT                      46.4                05/09/2017            No components found for: MCT  Lab Results      Component                Value               Date                      MCV                      99                  05/09/2017            Lab Results      Component                Value               Date                      MCH                      31.5                05/09/2017            Lab Results       Component                Value               Date                      MCHC                     31.9                05/09/2017            Lab Results      Component                Value               Date                      RDW                      15.5                05/09/2017            Lab Results      Component                Value               Date                      PLT                      315                 05/09/2017              Last Basic Metabolic Panel:  Lab Results      Component                Value               Date                      NA                       138                 05/09/2017             Lab Results      Component                Value               Date                      POTASSIUM                4.3                 05/09/2017            Lab Results      Component                Value               Date                      CHLORIDE                 100                 05/09/2017            Lab Results      Component                Value               Date                      NILSON                      9.6                 05/09/2017            Lab Results      Component                Value               Date                      CO2                      30                  05/09/2017            Lab Results      Component                Value               Date                      BUN                      13                  05/09/2017            Lab Results      Component                Value               Date                      CR                       0.77                05/09/2017            Lab Results      Component                Value               Date                      GLC                      140                 05/09/2017              UA RESULTS:  Lab Test        05/09/17 05/26/16                       1017          0940          COLOR        Yellow       Yellow        APPEARANCE   Slightly Cl* Clear         URINEGLC     Negative     Negative       URINEBILI    Negative     Negative      URINEKETONE  Negative     Negative      SG           1.008        1.025         UBLD         Negative     Negative      URINEPH      5.0          5.5           PROTEIN      Negative     Negative      UROBILINOGEN  --          0.2           NITRITE      Positive*    Negative      LEUKEST      Large*       Negative      RBCU         4*            --           WBCU         53*           --             Surgical team updated on UA           PAC Discussion and Assessment    ASA Classification: 2  Case is suitable for: ASC  Anesthetic techniques and relevant risks discussed: GA  Invasive monitoring and risk discussed: Yes  Types:   Possibility and Risk of blood transfusion discussed: Yes  NPO instructions given:   Additional anesthetic preparation and risks discussed:   Needs early admission to pre-op area:   Other:     PAC Resident/NP Anesthesia Assessment:  Antoine Russo is a 70 yo male scheduled for Left Varicocele Repair, Denervation of Left Testis on 5/23/2017 by Dr. Aggarwal. PAC referral by Dr. Aggarwal for assessment and optimization of anesthesia. Previous anesthesia, 9 times at [facility], without complications.     1) Cardiac: HTN and CAD. Denies current cardiac symptoms. EKG today NSR with LVH. A-fib when hospitalized last year, no recurrence and not anticoagulated.   2) Pulmonary: Smoked 1 PPD for 40 years, quit 2006. Denies current pulmonary symptoms  3) GI: Oral cancer in 2012, surgically treated by [facility]. 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 S/P bowel resection.  4) ETOH use, has cut back to 3 beers daily. Denies withdrawals with hospitalizations.  5) Endo: DM II, A1C 8.9 on 4/4/2017. Glipizide recently added to metformin.  6) Pain in left scrotum      METS >4, feasible airway     Pt also evaluated by Dr. Dickens. Please see recommendations below. Labs drawn today.  I spent 20 minutes face to face with pt, assessing, examining, and educating        Reviewed  and Signed by PAC Mid-Level Provider/Resident  Mid-Level Provider/Resident: FERNANDO Hendrix  Date: 5/9/2017  Time: 0920    Attending Anesthesiologist Anesthesia Assessment:  71 year old for left varicocele repair and denervation of left testis. Chart reviewed, patient seen and evaluated; agree with above assessment. Long term smoker, quit after oral cancer in 2012; also S/P pancreatectomy and splenectomy 2016 for pancreatic cancer. Stable DM II; stable HTN. He has the diagnosis of CAD on his med history list, but no prior revascularizations or known MI. Echo in 2013 showed normal right and left ventricular size and function, gd II diastolic dysfunction. No difficulty last year with pancreatectomy (although had anastamotic leak, readmitted, had episode of a fib at that time, resolved).     Patient is appropriate for the planned procedure without further workup or medical management change. The final anesthesia plan will be determined by the physician anesthesiologist caring for the patient on the day of surgery.      Reviewed and Signed by PAC Anesthesiologist  Anesthesiologist: josiane  Date: 5/9/2017  Time:   Pass/Fail: Pass  Disposition:     PAC Pharmacist Assessment:        Pharmacist:   Date:   Time:      Anesthesia Plan      History & Physical Review  History and physical reviewed and following examination; no interval change.    ASA Status:  3 .        Plan for General and ETT with Intravenous induction. Maintenance will be Inhalation.    PONV prophylaxis:  Ondansetron (or other 5HT-3) and Dexamethasone or Solumedrol  Additional equipment: Videolaryngoscope H/o of impossible mask ventilation and difficult intubation, intubated with C-MAC multiple times.   Plan to use C-MAC. Discussed the possibility of awake intubation if asleep is not successful. Plan to use Succinylcholine. Difficult intubation cart available.       Postoperative Care  Postoperative pain management:  Multi-modal analgesia.       Consents  Anesthetic plan, risks, benefits and alternatives discussed with:  Patient..                          .

## 2017-05-09 NOTE — MR AVS SNAPSHOT
After Visit Summary   2017    Antoine Russo    MRN: [id number]           Patient Information     Date Of Birth          1945        Visit Information        Provider Department      2017 10:00 AM Rn, [facility]        Care Instructions    Preparing for Your Surgery      Name:  Antoine Russo   MRN:  [id number]   :  1945   Today's Date:  2017     Arriving for surgery:  Surgery date:  17  Surgery time:  12:20 pm  Arrival time:  10:45 am    Please come to:  [facility]  [address] [place] [id number]     Parking is available in front of the Clinics and Surgery Center building from 5:30AM to 8:00PM.  -  Proceed to the 5th floor to check into the Ambulatory Surgery Center.              >> There will be patient concierges on the 1st and 5th floor, for assistance or an escort, if you would like.              >> Please call [phone] with any questions.  -  Bring your ID and insurance card.    What can I eat or drink?  -  You may have solid food or milk products until 8 hours prior to your surgery. (4 am)  -  You may have water, apple juice, BLACK coffee (NO creamer or nondairy creamer), or 7up/Sprite until 2 hours prior to your surgery. (10 am)    Which medicines can I take?  -Do not bring your own medications to the hospital.        -  Stop Multivitamin 1 week prior to surgery.        -  Stop Aspirin 5 days prior to surgery.    -  Do NOT take these medications in the morning, the day of surgery:  Metformin, Glipizide,     -  Please take these medications the day of surgery:  Atenolol, Tamsulosin  Acetaminophen (Tylenol) if needed    How do I prepare myself?  -  Take two showers: one the night before surgery; and one the morning of surgery.         Use Scrubcare or Hibiclens to wash from neck down.  You may use your own shampoo and conditioner. No other hair products.   -  Do NOT use  lotion, powder, deodorant, or antiperspirant the day of your surgery.  -  Do NOT wear any jewelry.    Questions or Concerns:  If you have questions or concerns, please call the  Preoperative Assessment Center, Monday-Friday 7AM-7PM:  [phone]    AFTER YOUR SURGERY  Breathing exercises   Breathing exercises help you recover faster. Take deep breaths and let the air out slowly. This will:     Help you wake up after surgery.    Help prevent complications like pneumonia.  Preventing complications will help you go home sooner.   Nausea and vomiting   You may feel sick to your stomach after surgery; if so, let your nurse know.    Pain control:  After surgery, you may have pain. Our goal is to help you manage your pain. Pain medicine will help you feel comfortable enough to do activities that will help you heal.  These activities may include breathing exercises, walking and physical therapy.   To help your health care team treat your pain we will ask: 1) If you have pain  2) where it is located 3) describe your pain in your words  Methods of pain control include medications given by mouth, vein or by nerve block for some surgeries.  Sequential Compression Device (SCD) or Pneumo Boots:  You may need to wear SCD S on your legs or feet. These are wraps connected to a machine that pumps in air and releases it. The repeated pumping helps prevent blood clots from forming.                 Follow-ups after your visit        Your next 10 appointments already scheduled     May 09, 2017 10:00 AM CDT   (Arrive by 9:45 AM)   PAC RN ASSESSMENT with Esther Pac Rn   [facility] ([facility])    [address] [place] [address]  4th [facility] [id number] [phone]            May 09, 2017 10:30 AM CDT   (Arrive by 10:15 AM)   PAC Anesthesia Consult with Esther Pac Anesthesiologist   [facility]operative [facility] [place] ([facility])    [address] [place] [address]  [facility] [address]   [phone]            May 09, 2017 10:45 AM CDT   LAB with  LAB   [facility] ([facility])    [address] [place] [address]  1st Floor  [facility] [id number] [phone]           Patient must bring picture ID.  Patient should be prepared to give a urine specimen  Please do not eat 10-12 hours before your appointment if you are coming in fasting for labs on lipids, cholesterol, or glucose (sugar).  Pregnant women should follow their Care Team instructions. Water with medications is okay. Do not drink coffee or other fluids.   If you have concerns about taking  your medications, please ask at office or if scheduling via OpenX, send a message by clicking on Secure Messaging, Message Your Care Team.            May 23, 2017   Procedure with Marcio Aggarwal MD   [facility] ([facility])    [address] [place] [address]  5th [facility] [id number] [phone]           Located in the Clinics and Surgery Center at [address] [place] [id number].   parking is very convenient and highly recommended.  is a $6 flat rate fee.  Both  and self parkers should enter the main arrival plaza from [facility]; parking attendants will direct you based on your parking preference.            Jun 22, 2017  8:40 AM CDT   (Arrive by 8:25 AM)   Post-Op with Marcio Aggarwal MD   [facility]rologic Cancers ([facility])    [address] [place] [address]  4th Floor  [facility] [address] [phone]            Jul 31, 2017  8:00 AM CDT   CT CHEST W CONTRAST with WYCT1   [facility] CT ([facility])    [address] [facility] [address] [phone]           Please bring any scans or X-rays taken at other hospitals, if similar tests were done. Also bring a list of your medicines, including vitamins, minerals and  over-the-counter drugs. It is safest to leave personal items at home.  Be sure to tell your doctor:   If you have any allergies.   If there s any chance you are pregnant.   If you are breastfeeding.   If you have any special needs.  You will have contrast for this exam. To prepare:   Do not eat or drink for 2 hours before your exam. If you need to take medicine, you may take it with small sips of water. (We may ask you to take liquid medicine as well.)   The day before your exam, drink extra fluids at least six 8-ounce glasses (unless your doctor tells you to restrict your fluids).  Patients over 70 or patients with diabetes or kidney problems:   If you haven t had a blood test (creatinine test) within the last 30 days, go to your clinic or Diagnostic Imaging Department for this test.  If you have diabetes:   If your kidney function is normal, continue taking your metformin (Avandamet, Glucophage, Glucovance, Metaglip) on the day of your exam.   If your kidney function is abnormal, wait 48 hours before restarting this medicine.  Please wear loose clothing, such as a sweat suit or jogging clothes. Avoid snaps, zippers and other metal. We may ask you to undress and put on a hospital gown.  If you have any questions, please call the Imaging Department where you will have your exam.            Jul 31, 2017  8:30 AM CDT   CT SOFT TISSUE NECK W CONTRAST with WYCT1   [facility] CT ([facility])    [address] [facility] [address] [phone]           Please bring any scans or X-rays taken at other hospitals, if similar tests were done. Also bring a list of your medicines, including vitamins, minerals and over-the-counter drugs. It is safest to leave personal items at home.  Be sure to tell your doctor:   If you have any allergies.   If there s any chance you are pregnant.   If you are breastfeeding.   If you have any special needs.  You will have contrast for this exam. To prepare:   Do  not eat or drink for 2 hours before your exam. If you need to take medicine, you may take it with small sips of water. (We may ask you to take liquid medicine as well.)   The day before your exam, drink extra fluids at least six 8-ounce glasses (unless your doctor tells you to restrict your fluids).  Patients over 70 or patients with diabetes or kidney problems:   If you haven t had a blood test (creatinine test) within the last 30 days, go to your clinic or Diagnostic Imaging Department for this test.  If you have diabetes:   If your kidney function is normal, continue taking your metformin (Avandamet, Glucophage, Glucovance, Metaglip) on the day of your exam.   If your kidney function is abnormal, wait 48 hours before restarting this medicine.  Please wear loose clothing, such as a sweat suit or jogging clothes. Avoid snaps, zippers and other metal. We may ask you to undress and put on a hospital gown.  If you have any questions, please call the Imaging Department where you will have your exam.            Aug 02, 2017 10:00 AM CDT   (Arrive by 9:45 AM)   RETURN TUMOR VISIT with Gerson Ravi MD   [facility] ([facility])    [address] [facility]  4th [facility] [address] [phone]              Future tests that were ordered for you today     Open Future Orders        Priority Expected Expires Ordered    Basic metabolic panel Routine 5/9/2017 6/8/2017 5/9/2017    CBC with platelets Routine 5/9/2017 6/8/2017 5/9/2017    UA reflex to Microscopic and Culture Routine 5/9/2017 6/8/2017 5/9/2017            Who to contact     Please call your clinic at [phone] to:    Ask questions about your health    Make or cancel appointments    Discuss your medicines    Learn about your test results    Speak to your doctor   If you have compliments or concerns about an experience at your clinic, or if you wish to file a complaint, please contact [facility]  Physicians Patient Relations at [phone] or email us at [email or web address]         Additional Information About Your Visit        [employer] Information     [employer] gives you secure access to your electronic health record. If you see a primary care provider, you can also send messages to your care team and make appointments. If you have questions, please call your primary care clinic.  If you do not have a primary care provider, please call [phone] and they will assist you.      [employer] is an electronic gateway that provides easy, online access to your medical records. With [employer], you can request a clinic appointment, read your test results, renew a prescription or communicate with your care team.     To access your existing account, please contact your [facility] or call [phone] for assistance.        Care EveryWhere ID     This is your Care EveryWhere ID. This could be used by other organizations to access your [place] medical records  [id number]         Blood Pressure from Last 3 Encounters:   05/09/17 163/87   04/04/17 139/80   03/10/17 150/76    Weight from Last 3 Encounters:   05/09/17 90.7 kg (199 lb 14.4 oz)   04/04/17 87.5 kg (193 lb)   03/10/17 88.5 kg (195 lb)              Today, you had the following     No orders found for display         Today's Medication Changes          These changes are accurate as of: 5/9/17  9:43 AM.  If you have any questions, ask your nurse or doctor.               These medicines have changed or have updated prescriptions.        Dose/Directions    atenolol 100 MG tablet   Commonly known as:  TENORMIN   This may have changed:  when to take this   Used for:  Hypertension, benign essential, goal below 140/90        Dose:  100 mg   Take 1 tablet (100 mg) by mouth daily   Quantity:  90 tablet   Refills:  3       glipiZIDE 2.5 MG 24 hr tablet   Commonly known as:  glipiZIDE XL   This may have changed:  when to take this    Used for:  Type 2 diabetes mellitus with diabetic polyneuropathy, without long-term current use of insulin (H)        Dose:  2.5 mg   Take 1 tablet (2.5 mg) by mouth daily   Quantity:  90 tablet   Refills:  1       metFORMIN 750 MG 24 hr tablet   Commonly known as:  GLUCOPHAGE-XR   This may have changed:  when to take this   Used for:  Type 2 diabetes mellitus with diabetic polyneuropathy, without long-term current use of insulin (H)        Dose:  1500 mg   Take 2 tablets (1,500 mg) by mouth daily   Quantity:  180 tablet   Refills:  3       multivitamin, therapeutic with minerals Tabs tablet   This may have changed:  when to take this   Used for:  Surgery aftercare        Dose:  1 tablet   Take 1 tablet by mouth daily.   Quantity:  30 each   Refills:  1       tamsulosin 0.4 MG capsule   Commonly known as:  FLOMAX   This may have changed:  when to take this   Used for:  Urgency of urination        Dose:  0.4 mg   Take 1 capsule (0.4 mg) by mouth daily   Quantity:  90 capsule   Refills:  3                Primary Care Provider Office Phone # Fax #    Katie Gross [phone]       [facility] [address] [facility] [address]        Thank you!     Thank you for choosing [facility]  for your care. Our goal is always to provide you with excellent care. Hearing back from our patients is one way we can continue to improve our services. Please take a few minutes to complete the written survey that you may receive in the mail after your visit with us. Thank you!             Your Updated Medication List - Protect others around you: Learn how to safely use, store and throw away your medicines at www.disposemymeds.org.          This list is accurate as of: 5/9/17  9:43 AM.  Always use your most recent med list.                   Brand Name Dispense Instructions for use    aspirin 81 MG tablet      Take 81 mg by mouth every other day Reported on 2/28/2017        atenolol 100 MG tablet    TENORMIN    90 tablet    Take 1 tablet (100 mg) by mouth daily       blood glucose lancets standard    no brand specified    1 Box    Use to test blood sugar two times daily       blood glucose monitoring test strip    no brand specified    100 each    Use to test blood sugar two times daily       glipiZIDE 2.5 MG 24 hr tablet    glipiZIDE XL    90 tablet    Take 1 tablet (2.5 mg) by mouth daily       metFORMIN 750 MG 24 hr tablet    GLUCOPHAGE-XR    180 tablet    Take 2 tablets (1,500 mg) by mouth daily       multivitamin, therapeutic with minerals Tabs tablet     30 each    Take 1 tablet by mouth daily.       tamsulosin 0.4 MG capsule    FLOMAX    90 capsule    Take 1 capsule (0.4 mg) by mouth daily

## 2017-05-09 NOTE — H&P
Pre-Operative H & P     CC:  Preoperative exam to assess for increased cardiopulmonary risk while undergoing surgery and anesthesia.    Date of Encounter: 5/9/2017  Primary Care Physician:  Katie Gross  Antoine Russo is a 71 year old male who presents for pre-operative H & P in preparation for Left Varicocele Repair, Denervation of Left Testis with Dr. Aggarwal on 5/23/17 at [facility].     History is obtained from the patient.   Pt complains of constant pain in left scrotum      Past Medical History  Past Medical History:   Diagnosis Date     C. 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     Noninfectious ileitis        Past Surgical History  Past Surgical History:   Procedure Laterality Date     BREAST SURGERY  2008    right breast mass benign     COLONOSCOPY      multiple polyps removed     COLONOSCOPY  8/24/2011    Procedure:COMBINED COLONOSCOPY, REMOVE TUMOR/POLYP/LESION BY SNARE; Surgeon:MILEY ARBOLEDA; Location:[facility]     COLONOSCOPY  12/17/2012    Procedure: COLONOSCOPY;;  Surgeon: Leon Maurer MD;  Location: [facility]     COLONOSCOPY  12/18/2012    Procedure: COLONOSCOPY;;  Surgeon: Leon Maurer MD;  [facility]     DISSECTION RADICAL NECK BILATERAL  8/2/2012    Procedure: DISSECTION RADICAL NECK BILATERAL;;  Surgeon: Yung Alvares MD;  [facility]     ENDOSCOPIC RETROGRADE CHOLANGIOPANCREATOGRAM N/A 5/10/2016    Procedure: COMBINED ENDOSCOPIC RETROGRADE CHOLANGIOPANCREATOGRAPHY, PLACE TUBE/STENT;  Surgeon: Yovanny Beasley MD;  [facility]     ENDOSCOPIC ULTRASOUND UPPER GASTROINTESTINAL TRACT (GI) N/A 2/3/2016    Procedure: ENDOSCOPIC ULTRASOUND, ESOPHAGOSCOPY / UPPER GASTROINTESTINAL TRACT (GI);  Surgeon: Grabiel Plata MD;  [facility]     ESOPHAGOSCOPY,  GASTROSCOPY, DUODENOSCOPY (EGD), COMBINED N/A 2/3/2016    Procedure: COMBINED ENDOSCOPIC ULTRASOUND, ESOPHAGOSCOPY, GASTROSCOPY, DUODENOSCOPY (EGD), FINE NEEDLE ASPIRATE/BIOPSY;  Surgeon: Grabiel Plata MD;  [facility]     ESOPHAGOSCOPY, GASTROSCOPY, DUODENOSCOPY (EGD), COMBINED N/A 6/8/2016    Procedure: COMBINED ESOPHAGOSCOPY, GASTROSCOPY, DUODENOSCOPY (EGD), REMOVE FOREIGN BODY;  Surgeon: Yovanny Beasley MD;  [facility]     EXCISE LESION INTRAORAL  6/14/2012    Procedure: EXCISE LESION INTRAORAL;  Wide Local Excision Floor of Mouth, Direct Laryngoscopy, Bilateral Wells's Marsuplization, Split Thickness Skin Graft from right Thigh  Latex Safe;  Surgeon: Gerson Ravi MD;  [facility]     EXCISE LESION INTRAORAL  8/2/2012    Procedure: EXCISE LESION INTRAORAL;  Floor of Mouth Resection, Bilateral Selective Radical Neck Dissection, Tracheostomy, Left Radial Forearm  Free Flap with Alloderm, Nasogastric Feeding Tube Placement,    * Latex Safe*;  Surgeon: Gerson Ravi MD;  [facility]     EXCISE LESION INTRAORAL  12/11/2012    Procedure: EXCISE LESION INTRAORAL;  takedown of oral flap;  Surgeon: Yung Alvares MD;  [facility]     GRAFT FREE VASCULARIZED (LOCATION)  8/2/2012    Procedure: GRAFT FREE VASCULARIZED (LOCATION);;  Surgeon: Yung Alvares MD;  [facility]     GRAFT SKIN SPLIT THICKNESS FROM EXTREMITY  6/14/2012    Procedure: GRAFT SKIN SPLIT THICKNESS FROM EXTREMITY;;  Surgeon: Gerson Ravi MD;  [facility]     LAPAROSCOPIC ILEOSTOMY TAKEDOWN  6/6/2013    Procedure: LAPAROSCOPIC ILEOSTOMY TAKEDOWN;  Laparoscopic Closure of Enterostomy, Guerda's Type with IleoRectal Anastomosis ;  Surgeon: Grabiel Riddle MD;  [facility]     LAPAROTOMY EXPLORATORY  12/20/2012    Procedure: LAPAROTOMY EXPLORATORY;  Exploratory Laparotomy, total abdominal colectomy, ileostomy formation;  Surgeon: Miquel Cannon MD;  [facility]     LARYNGOSCOPY   6/14/2012    Procedure: LARYNGOSCOPY;;  Surgeon: Gerson Ravi MD;  [facility]     ORTHOPEDIC SURGERY      ganglian cyst left ankle     PANCREATECTOMY, SPLENECTOMY N/A 3/10/2016    Procedure: PANCREATECTOMY, SPLENECTOMY;  Surgeon: Nael Abel MD;  [facility]     SHOULDER SURGERY  2006, 2008    2006- right rotator cuff, 2008 bone spur on left. Dr. Hdez       Hx of Blood transfusions/reactions: none    Hx of abnormal bleeding or anti-platelet use: asa, pt takes it only once in a while, will hold for procedure    Menstrual history: No LMP for male patient.:     Steroid use in the last year: none    Personal or FH with difficulty with Anesthesia:  None        Prior to Admission Medications  Current Outpatient Prescriptions   Medication Sig Dispense Refill     atenolol (TENORMIN) 100 MG tablet Take 1 tablet (100 mg) by mouth daily (Patient taking differently: Take 100 mg by mouth every morning ) 90 tablet 3     metFORMIN (GLUCOPHAGE-XR) 750 MG 24 hr tablet Take 2 tablets (1,500 mg) by mouth daily (Patient taking differently: Take 1,500 mg by mouth every morning ) 180 tablet 3     glipiZIDE (GLIPIZIDE XL) 2.5 MG 24 hr tablet Take 1 tablet (2.5 mg) by mouth daily (Patient taking differently: Take 2.5 mg by mouth every morning ) 90 tablet 1     tamsulosin (FLOMAX) 0.4 MG 24 hr capsule Take 1 capsule (0.4 mg) by mouth daily (Patient taking differently: Take 0.4 mg by mouth every morning ) 90 capsule 3     multivitamin, therapeutic with minerals (THERA-VIT-M) TABS Take 1 tablet by mouth daily. (Patient taking differently: Take 1 tablet by mouth every morning ) 30 each 1     aspirin 81 MG tablet Take 81 mg by mouth every other day Reported on 2/28/2017       blood glucose (NO BRAND SPECIFIED) lancets standard Use to test blood sugar two times daily 1 Box 3     blood glucose monitoring (NO BRAND SPECIFIED) test strip Use to test blood sugar two times daily 100 each 2       Allergies  Allergies   Allergen Reactions      Nkda [No Known Drug Allergies]        Social History  Social History     Social History     Marital status:      Spouse name: N/A     Number of children: N/A     Years of education: N/A     Occupational History     [employer]      20 hr/week monday-Thur 7-noon     [employer]     RETIRED      Social History Main Topics     Smoking status: Former Smoker     Packs/day: 1.00     Years: 40.00     Types: Cigarettes     Quit date: 2006     Smokeless tobacco: Never Used     Alcohol use Yes      Comment: 6-12 beers/daily for at least 40 years; now 3-4 beers daily now     Drug use: No     Sexual activity: Yes     Partners: Female     Other Topics Concern      Service Yes     [employer] 6 years     Blood Transfusions No     Caffeine Concern Yes     0-2 cups     Occupational Exposure No     retired     Hobby Hazards No     Sleep Concern No     Stress Concern No     Weight Concern No     Special Diet Yes     healthy     Back Care No     Exercise Yes     Bike Helmet Not Asked     N/A     Seat Belt Yes     Self-Exams Yes     Parent/Sibling W/ Cabg, Mi Or Angioplasty Before 65f 55m? No     Social History Narrative    Son lives in [place] with 2 grandchildren 19 and 6       Family History  Family History   Problem Relation Age of Onset     DIABETES Sister      onset age 50     Alzheimer Disease Mother       80     Alzheimer Disease Father       85     DIABETES Other      nephew type 1     DIABETES Other      Anesthesia Reaction No family hx of      Colon Cancer No family hx of      Colon Polyps No family hx of      Crohn Disease No family hx of      Ulcerative Colitis No family hx of        Anesthesia Evaluation     . Pt has had prior anesthetic. Type: General and MAC           ROS/MED HX    ENT/Pulmonary:     (+)tobacco use, Past use 1 PPD for 40 years, quit  packs/day  , . .    Neurologic:     (+)neuropathy - feet,     Cardiovascular:     (+)  "hypertension--CAD, --. : . . . :. . Previous cardiac testing date:results:date: results:ECG reviewed date:5/9/2017 results: date: results:          METS/Exercise Tolerance:  >4 METS   Hematologic:  - neg hematologic  ROS       Musculoskeletal:  - neg musculoskeletal ROS       GI/Hepatic:     (+) Other GI/Hepatic S/P panceatectomy, splenectomy, bowel resection      Renal/Genitourinary:  - ROS Renal section negative       Endo:     (+) type II DM Not using insulin - not using insulin pump Obesity, .      Psychiatric:  - neg psychiatric ROS       Infectious Disease: Comment: H/o c diff colitis - neg infectious disease ROS       Malignancy:   (+) Malignancy History of Other  Other CA oral status post Surgery Oral tongue CA s/p glossectomy, B neck dissection        Other:    (+) No chance of pregnancy C-spine cleared: N/A, no H/O Chronic Pain,no other significant disability              Physical Exam  Normal systems: cardiovascular, pulmonary and dental    Airway   Mallampati: II  TM distance: >3 FB  Neck ROM: full    Dental   (+) missing    Cardiovascular   Rhythm and rate: regular and normal      Pulmonary    breath sounds clear to auscultation          The complete review of systems is negative other than noted in the HPI or here.   Temp: 98.2  F (36.8  C) Temp src: Oral BP: 163/87 Pulse: 65   Resp: 16 SpO2: 94 %         199 lbs 14.4 oz  5' 10\"   Body mass index is 28.68 kg/(m^2).       Physical Exam  Constitutional: Awake, alert, cooperative, no apparent distress, and appears stated age.  Eyes: Pupils equal, round and reactive to light, extra ocular muscles intact, sclera clear, conjunctiva normal.  HENT: Normocephalic, oral pharynx with moist mucus membranes, poor dentition, missing many teeth. No goiter appreciated.   Respiratory: Clear to auscultation bilaterally, no crackles or wheezing.  Cardiovascular: Regular rate and rhythm, normal S1 and S2, and no murmur noted.  Carotids +2, no bruits. No edema. Palpable " pulses to radial  DP and PT arteries.   GI: Normal bowel sounds, soft, non-distended, non-tender, no masses palpated, no hepatosplenomegaly.    Lymph/Hematologic: No cervical lymphadenopathy and no supraclavicular lymphadenopathy.  Genitourinary:  Deferred, scrotal pain  Skin: Warm and dry.  No rashes at anticipated surgical site.   Musculoskeletal: Full ROM of neck. There is no redness, warmth, or swelling of the joints. Gross motor strength is normal.    Neurologic: Awake, alert, oriented to name, place and time. Cranial nerves II-XII are grossly intact. Gait is normal.   Neuropsychiatric: Calm, cooperative. Normal affect.     Labs: (personally reviewed)  Lab Results   Component Value Date    WBC 10.6 05/09/2017     Lab Results   Component Value Date    RBC 4.70 05/09/2017     Lab Results   Component Value Date    HGB 14.8 05/09/2017     Lab Results   Component Value Date    HCT 46.4 05/09/2017     No components found for: MCT  Lab Results   Component Value Date    MCV 99 05/09/2017     Lab Results   Component Value Date    MCH 31.5 05/09/2017     Lab Results   Component Value Date    MCHC 31.9 05/09/2017     Lab Results   Component Value Date    RDW 15.5 05/09/2017     Lab Results   Component Value Date     05/09/2017     Last Basic Metabolic Panel:  Lab Results   Component Value Date     05/09/2017      Lab Results   Component Value Date    POTASSIUM 4.3 05/09/2017     Lab Results   Component Value Date    CHLORIDE 100 05/09/2017     Lab Results   Component Value Date    NILSON 9.6 05/09/2017     Lab Results   Component Value Date    CO2 30 05/09/2017     Lab Results   Component Value Date    BUN 13 05/09/2017     Lab Results   Component Value Date    CR 0.77 05/09/2017     Lab Results   Component Value Date     05/09/2017       UA RESULTS:  Recent Labs   Lab Test  05/09/17   1017  05/26/16   0940   COLOR  Yellow  Yellow   APPEARANCE  Slightly Cloudy  Clear   URINEGLC  Negative  Negative    URINEBILI  Negative  Negative   URINEKETONE  Negative  Negative   SG  1.008  1.025   UBLD  Negative  Negative   URINEPH  5.0  5.5   PROTEIN  Negative  Negative   UROBILINOGEN   --   0.2   NITRITE  Positive*  Negative   LEUKEST  Large*  Negative   RBCU  4*   --    WBCU  53*   --        EKG: Personally reviewed but formal cardiology read pendin2017 NSR, LVH      Cardiac echo: 6/3/2013  Echo 6/3/2013  Interpretation Summary  1. Borderline LVH. Normal LV systolic function. Stage II diastolic   dysfunction  2.  Normal RV size and function  3. No significant valvular   abnormalities       Left Ventricle  Borderline left ventricular hypertrophy. Normal LV chamber size, wall motion,   and systolic function with estimated ejection fraction 60%.  Left ventricular Doppler filling pattern consistent with pseudonormal filling.     Right Ventricle  The right ventricle is normal size.  Global right ventricular function is normal.     Atria  Mild to moderate left atrial enlargement is present.  Mild right atrial enlargement is present.     Mitral Valve  Focally thickened leaflets without stenosis. Minimal regurgitation.     Aortic Valve  Grossly normal trileaflet aortic valve without stenosis or regurgitation.     Tricuspid Valve  Grossly normal valve without stenosis. Minimal regurgitation. RVSP 29 mmHg   based on RAP 5 mmHg.     Pulmonic Valve  Grossly normal valve without stenosis or regurgitation.     Vessels  The aorta root is normal.  The inferior vena cava was normal in size with preserved respiratory   variability.     Pericardium  No pericardial effusion is present      ASSESSMENT and PLAN  Antoine Russo is a 71 year old male scheduled to undergo Left Varicocele Repair, Denervation of Left Testis. He has the following specific operative considerations:   - RCRI : Low serious cardiac risks.  0.9% risk of major adverse cardiac event.   - Anesthesia considerations:  Refer to PAC assessment in anesthesia  records  - VTE risk: 1.8% due to age and gender  - LAMONT # of risks 6/8 = 75%  - Post-op delirium risk: high risk due to age and ETOH use  - Risk of PONV score = 2.  If > 2, anti-emetic intervention recommended.        Previous anesthesia, 9 times at [facility], without complications.   1) Cardiac: HTN and CAD. Denies current cardiac symptoms. EKG today NSR with LVH. A-fib when hospitalized last year, no recurrence and not anticoagulated.   2) Pulmonary: Smoked 1 PPD for 40 years, quit 2006. Denies current pulmonary symptoms  3) GI: Oral cancer in 2012, surgically treated by [facility]. 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 S/P bowel resection.  4) ETOH use, has cut back to 3 beers daily. Denies withdrawals with hospitalizations.  5) Endo: DM II, A1C 8.9 on 4/4/2017. Glipizide recently added to metformin.  6) Pain in left scrotum      METS >4, feasible airway  Pt optimized for surgery. AVS with information on surgery time/arrival time, meds and NPO status given by nursing staff      Patient was discussed with Dr Dickens.    FERNANDO Colbert CNS  Preoperative Assessment Center  [facility]  Phone: [phone]  Fax: [phone]

## 2017-05-10 DIAGNOSIS — N39.0 UTI (URINARY TRACT INFECTION): Primary | ICD-10-CM

## 2017-05-10 LAB
BACTERIA SPEC CULT: ABNORMAL
INTERPRETATION ECG - MUSE: NORMAL
Lab: ABNORMAL
MICRO REPORT STATUS: ABNORMAL
MICROORGANISM SPEC CULT: ABNORMAL
SPECIMEN SOURCE: ABNORMAL

## 2017-05-10 RX ORDER — SULFAMETHOXAZOLE/TRIMETHOPRIM 800-160 MG
1 TABLET ORAL 2 TIMES DAILY
Qty: 20 TABLET | Refills: 0 | Status: SHIPPED | OUTPATIENT
Start: 2017-05-10 | End: 2017-07-13

## 2017-05-15 ENCOUNTER — CARE COORDINATION (OUTPATIENT)
Dept: UROLOGY | Facility: CLINIC | Age: 72
End: 2017-05-15

## 2017-05-15 NOTE — PROGRESS NOTES
Notified patient of his urine culture results   Positive for UTI bactrim called to [facility] in [place]  Patient denies have any questions about his upcoming surgery  Patient to take bactrim for the next 5 days    Verbalized understanding    Lilian Churchill, FRANCHESCA   Care Coordinator Urology

## 2017-05-23 ENCOUNTER — ANESTHESIA (OUTPATIENT)
Dept: SURGERY | Facility: AMBULATORY SURGERY CENTER | Age: 72
End: 2017-05-23

## 2017-05-23 ENCOUNTER — HOSPITAL ENCOUNTER (OUTPATIENT)
Facility: AMBULATORY SURGERY CENTER | Age: 72
End: 2017-05-23
Attending: UROLOGY

## 2017-05-23 VITALS
BODY MASS INDEX: 28.49 KG/M2 | SYSTOLIC BLOOD PRESSURE: 103 MMHG | RESPIRATION RATE: 16 BRPM | HEIGHT: 70 IN | DIASTOLIC BLOOD PRESSURE: 52 MMHG | TEMPERATURE: 98.3 F | WEIGHT: 199 LBS | OXYGEN SATURATION: 95 %

## 2017-05-23 DIAGNOSIS — K59.03 DRUG-INDUCED CONSTIPATION: ICD-10-CM

## 2017-05-23 DIAGNOSIS — Z78.9: ICD-10-CM

## 2017-05-23 DIAGNOSIS — G89.18 POSTOPERATIVE PAIN: Primary | ICD-10-CM

## 2017-05-23 LAB
GLUCOSE BLDC GLUCOMTR-MCNC: 148 MG/DL (ref 70–99)
GLUCOSE BLDC GLUCOMTR-MCNC: 160 MG/DL (ref 70–99)

## 2017-05-23 PROCEDURE — 88304 TISSUE EXAM BY PATHOLOGIST: CPT | Performed by: UROLOGY

## 2017-05-23 RX ORDER — NALOXONE HYDROCHLORIDE 0.4 MG/ML
.1-.4 INJECTION, SOLUTION INTRAMUSCULAR; INTRAVENOUS; SUBCUTANEOUS
Status: DISCONTINUED | OUTPATIENT
Start: 2017-05-23 | End: 2017-05-24 | Stop reason: HOSPADM

## 2017-05-23 RX ORDER — ACETAMINOPHEN 325 MG/1
975 TABLET ORAL ONCE
Status: COMPLETED | OUTPATIENT
Start: 2017-05-23 | End: 2017-05-23

## 2017-05-23 RX ORDER — SODIUM CHLORIDE, SODIUM LACTATE, POTASSIUM CHLORIDE, CALCIUM CHLORIDE 600; 310; 30; 20 MG/100ML; MG/100ML; MG/100ML; MG/100ML
INJECTION, SOLUTION INTRAVENOUS CONTINUOUS
Status: DISCONTINUED | OUTPATIENT
Start: 2017-05-23 | End: 2017-05-23 | Stop reason: HOSPADM

## 2017-05-23 RX ORDER — FENTANYL CITRATE 50 UG/ML
25-50 INJECTION, SOLUTION INTRAMUSCULAR; INTRAVENOUS EVERY 5 MIN PRN
Status: DISCONTINUED | OUTPATIENT
Start: 2017-05-23 | End: 2017-05-23 | Stop reason: HOSPADM

## 2017-05-23 RX ORDER — ONDANSETRON 2 MG/ML
INJECTION INTRAMUSCULAR; INTRAVENOUS PRN
Status: DISCONTINUED | OUTPATIENT
Start: 2017-05-23 | End: 2017-05-23

## 2017-05-23 RX ORDER — EPHEDRINE SULFATE 50 MG/ML
INJECTION, SOLUTION INTRAMUSCULAR; INTRAVENOUS; SUBCUTANEOUS PRN
Status: DISCONTINUED | OUTPATIENT
Start: 2017-05-23 | End: 2017-05-23

## 2017-05-23 RX ORDER — PAPAVERINE HYDROCHLORIDE 30 MG/ML
INJECTION INTRAMUSCULAR; INTRAVENOUS PRN
Status: DISCONTINUED | OUTPATIENT
Start: 2017-05-23 | End: 2017-05-23 | Stop reason: HOSPADM

## 2017-05-23 RX ORDER — DEXAMETHASONE SODIUM PHOSPHATE 4 MG/ML
INJECTION, SOLUTION INTRA-ARTICULAR; INTRALESIONAL; INTRAMUSCULAR; INTRAVENOUS; SOFT TISSUE PRN
Status: DISCONTINUED | OUTPATIENT
Start: 2017-05-23 | End: 2017-05-23

## 2017-05-23 RX ORDER — CEFAZOLIN SODIUM 1 G/3ML
1 INJECTION, POWDER, FOR SOLUTION INTRAMUSCULAR; INTRAVENOUS SEE ADMIN INSTRUCTIONS
Status: DISCONTINUED | OUTPATIENT
Start: 2017-05-23 | End: 2017-05-23 | Stop reason: HOSPADM

## 2017-05-23 RX ORDER — PROPOFOL 10 MG/ML
INJECTION, EMULSION INTRAVENOUS PRN
Status: DISCONTINUED | OUTPATIENT
Start: 2017-05-23 | End: 2017-05-23

## 2017-05-23 RX ORDER — LIDOCAINE HYDROCHLORIDE 20 MG/ML
INJECTION, SOLUTION INFILTRATION; PERINEURAL PRN
Status: DISCONTINUED | OUTPATIENT
Start: 2017-05-23 | End: 2017-05-23

## 2017-05-23 RX ORDER — AMOXICILLIN 250 MG
1-2 CAPSULE ORAL 2 TIMES DAILY
Qty: 20 TABLET | Refills: 0 | Status: SHIPPED | OUTPATIENT
Start: 2017-05-23 | End: 2017-06-22

## 2017-05-23 RX ORDER — LIDOCAINE 40 MG/G
CREAM TOPICAL
Status: DISCONTINUED | OUTPATIENT
Start: 2017-05-23 | End: 2017-05-23 | Stop reason: HOSPADM

## 2017-05-23 RX ORDER — BUPIVACAINE HYDROCHLORIDE 5 MG/ML
INJECTION, SOLUTION PERINEURAL PRN
Status: DISCONTINUED | OUTPATIENT
Start: 2017-05-23 | End: 2017-05-23 | Stop reason: HOSPADM

## 2017-05-23 RX ORDER — ONDANSETRON 2 MG/ML
4 INJECTION INTRAMUSCULAR; INTRAVENOUS EVERY 30 MIN PRN
Status: DISCONTINUED | OUTPATIENT
Start: 2017-05-23 | End: 2017-05-24 | Stop reason: HOSPADM

## 2017-05-23 RX ORDER — HYDROCODONE BITARTRATE AND ACETAMINOPHEN 5; 325 MG/1; MG/1
1-2 TABLET ORAL EVERY 4 HOURS PRN
Qty: 20 TABLET | Refills: 0 | Status: SHIPPED | OUTPATIENT
Start: 2017-05-23 | End: 2017-06-22

## 2017-05-23 RX ORDER — SODIUM CHLORIDE, SODIUM LACTATE, POTASSIUM CHLORIDE, CALCIUM CHLORIDE 600; 310; 30; 20 MG/100ML; MG/100ML; MG/100ML; MG/100ML
INJECTION, SOLUTION INTRAVENOUS CONTINUOUS
Status: DISCONTINUED | OUTPATIENT
Start: 2017-05-23 | End: 2017-05-24 | Stop reason: HOSPADM

## 2017-05-23 RX ORDER — FENTANYL CITRATE 50 UG/ML
INJECTION, SOLUTION INTRAMUSCULAR; INTRAVENOUS PRN
Status: DISCONTINUED | OUTPATIENT
Start: 2017-05-23 | End: 2017-05-23

## 2017-05-23 RX ORDER — MEPERIDINE HYDROCHLORIDE 25 MG/ML
12.5 INJECTION INTRAMUSCULAR; INTRAVENOUS; SUBCUTANEOUS
Status: DISCONTINUED | OUTPATIENT
Start: 2017-05-23 | End: 2017-05-24 | Stop reason: HOSPADM

## 2017-05-23 RX ORDER — ONDANSETRON 4 MG/1
4 TABLET, ORALLY DISINTEGRATING ORAL EVERY 30 MIN PRN
Status: DISCONTINUED | OUTPATIENT
Start: 2017-05-23 | End: 2017-05-24 | Stop reason: HOSPADM

## 2017-05-23 RX ORDER — HYDROCODONE BITARTRATE AND ACETAMINOPHEN 5; 325 MG/1; MG/1
1-2 TABLET ORAL ONCE
Status: DISCONTINUED | OUTPATIENT
Start: 2017-05-23 | End: 2017-05-24 | Stop reason: HOSPADM

## 2017-05-23 RX ADMIN — LIDOCAINE HYDROCHLORIDE 100 MG: 20 INJECTION, SOLUTION INFILTRATION; PERINEURAL at 13:09

## 2017-05-23 RX ADMIN — ONDANSETRON 4 MG: 2 INJECTION INTRAMUSCULAR; INTRAVENOUS at 14:39

## 2017-05-23 RX ADMIN — EPHEDRINE SULFATE 10 MG: 50 INJECTION, SOLUTION INTRAMUSCULAR; INTRAVENOUS; SUBCUTANEOUS at 13:46

## 2017-05-23 RX ADMIN — Medication 100 MCG: at 13:34

## 2017-05-23 RX ADMIN — FENTANYL CITRATE 50 MCG: 50 INJECTION, SOLUTION INTRAMUSCULAR; INTRAVENOUS at 13:04

## 2017-05-23 RX ADMIN — FENTANYL CITRATE 25 MCG: 50 INJECTION, SOLUTION INTRAMUSCULAR; INTRAVENOUS at 13:55

## 2017-05-23 RX ADMIN — SODIUM CHLORIDE, SODIUM LACTATE, POTASSIUM CHLORIDE, CALCIUM CHLORIDE: 600; 310; 30; 20 INJECTION, SOLUTION INTRAVENOUS at 10:56

## 2017-05-23 RX ADMIN — PROPOFOL 250 MG: 10 INJECTION, EMULSION INTRAVENOUS at 13:09

## 2017-05-23 RX ADMIN — SODIUM CHLORIDE, SODIUM LACTATE, POTASSIUM CHLORIDE, CALCIUM CHLORIDE: 600; 310; 30; 20 INJECTION, SOLUTION INTRAVENOUS at 14:07

## 2017-05-23 RX ADMIN — Medication 100 MCG: at 14:04

## 2017-05-23 RX ADMIN — ACETAMINOPHEN 975 MG: 325 TABLET ORAL at 10:56

## 2017-05-23 RX ADMIN — Medication 100 MCG: at 13:46

## 2017-05-23 RX ADMIN — Medication 100 MCG: at 13:24

## 2017-05-23 RX ADMIN — Medication 50 MCG: at 14:11

## 2017-05-23 RX ADMIN — FENTANYL CITRATE 25 MCG: 50 INJECTION, SOLUTION INTRAMUSCULAR; INTRAVENOUS at 14:25

## 2017-05-23 RX ADMIN — DEXAMETHASONE SODIUM PHOSPHATE 4 MG: 4 INJECTION, SOLUTION INTRA-ARTICULAR; INTRALESIONAL; INTRAMUSCULAR; INTRAVENOUS; SOFT TISSUE at 14:39

## 2017-05-23 NOTE — IP AVS SNAPSHOT
MRN:[id number]                      After Visit Summary   5/23/2017    Antoine Russo    MRN: [id number]           Thank you!     Thank you for choosing [facility] for your care. Our goal is always to provide you with excellent care. Hearing back from our patients is one way we can continue to improve our services. Please take a few minutes to complete the written survey that you may receive in the mail after you visit with us. Thank you!        Patient Information     Date Of Birth          1945        About your hospital stay     You were admitted on:  May 23, 2017 [facility]    You were discharged on:  May 23, 2017       Who to Call     For medical emergencies, please call 911.  For non-urgent questions about your medical care, please call your primary care provider or clinic, [phone]  For questions related to your surgery, please call your surgery clinic        Attending Provider     Provider Specialty    Marcio Aggarwal MD Urology       Primary Care Provider Office Phone # Fax #    Katie Gross [phone]       [facility] [address] [place] [address]        After Care Instructions     Discharge Instructions       Activity  - No strenuous exercise for 3 weeks.  - No lifting, pushing, pulling more than 10 pounds for 3 weeks.       Urination  - If you are unable to urinate you should return urgently to the ED or call clinic to try to arrange for an urgent visit same day.     Wound Care  -Keep incision clean and dry  -You may shower but must avoid directly scrubbing your incisions  -No baths or pools for 3 weeks    Medications  - Transition from narcotic pain medications to tylenol (acetaminophen) as you are able.  Wean yourself off all pain medications as you are able.  - Some pain medications contain both tylenol (acetaminophen) and a narcotic (Norco, vicodin, percocet), do not  "take more than 4,000mg of Tylenol (acetaminophen) from all sources in any 24 hour period.  - Narcotics can make you constipated.  Take over the counter fiber (metamucil or benefiber) and stool softeners (miralax, docusate or senna) while taking narcotic pain medications, but stop if you develop diarrhea.  - No driving or operating machinery while taking narcotic pain medications     Follow-Up:  - Call your primary care provider to touch base regarding your recent admission.    - Call or return sooner than your regularly scheduled visit if you develop any of the following: fever (greater than 101.5), uncontrolled pain, uncontrolled nausea or vomiting, as well as increased redness, swelling, or drainage from your wound.     Phone numbers:   - Monday through Friday 8am to 4:30pm: Call [phone] with questions or to schedule or confirm appointment.    - Nights or weekends: call the after hours emergency pager - [phone] and tell the  \"I would like to page the Urology Resident on call.\"  - For emergencies, call 911                  Your next 10 appointments already scheduled     Jun 22, 2017  8:40 AM CDT   (Arrive by 8:25 AM)   Post-Op with Marcio Aggarwal MD   [facility] ([facility])    [address] [address] [phone]            Jul 31, 2017  8:00 AM CDT   CT CHEST W CONTRAST with WYCT1   [facility] CT ([facility])    [address] [facility] [address] [phone]           Please bring any scans or X-rays taken at other hospitals, if similar tests were done. Also bring a list of your medicines, including vitamins, minerals and over-the-counter drugs. It is safest to leave personal items at home.  Be sure to tell your doctor:   If you have any allergies.   If there s any chance you are pregnant.   If you are breastfeeding.   If you have any special needs. "  You will have contrast for this exam. To prepare:   Do not eat or drink for 2 hours before your exam. If you need to take medicine, you may take it with small sips of water. (We may ask you to take liquid medicine as well.)   The day before your exam, drink extra fluids at least six 8-ounce glasses (unless your doctor tells you to restrict your fluids).  Patients over 70 or patients with diabetes or kidney problems:   If you haven t had a blood test (creatinine test) within the last 30 days, go to your clinic or Diagnostic Imaging Department for this test.  If you have diabetes:   If your kidney function is normal, continue taking your metformin (Avandamet, Glucophage, Glucovance, Metaglip) on the day of your exam.   If your kidney function is abnormal, wait 48 hours before restarting this medicine.  Please wear loose clothing, such as a sweat suit or jogging clothes. Avoid snaps, zippers and other metal. We may ask you to undress and put on a hospital gown.  If you have any questions, please call the Imaging Department where you will have your exam.            Jul 31, 2017  8:30 AM CDT   CT SOFT TISSUE NECK W CONTRAST with WYCT1   [facility] CT ([facility])    [address] [facility] [address] [phone]           Please bring any scans or X-rays taken at other hospitals, if similar tests were done. Also bring a list of your medicines, including vitamins, minerals and over-the-counter drugs. It is safest to leave personal items at home.  Be sure to tell your doctor:   If you have any allergies.   If there s any chance you are pregnant.   If you are breastfeeding.   If you have any special needs.  You will have contrast for this exam. To prepare:   Do not eat or drink for 2 hours before your exam. If you need to take medicine, you may take it with small sips of water. (We may ask you to take liquid medicine as well.)   The day before your exam, drink extra fluids at least six  8-ounce glasses (unless your doctor tells you to restrict your fluids).  Patients over 70 or patients with diabetes or kidney problems:   If you haven t had a blood test (creatinine test) within the last 30 days, go to your clinic or Diagnostic Imaging Department for this test.  If you have diabetes:   If your kidney function is normal, continue taking your metformin (Avandamet, Glucophage, Glucovance, Metaglip) on the day of your exam.   If your kidney function is abnormal, wait 48 hours before restarting this medicine.  Please wear loose clothing, such as a sweat suit or jogging clothes. Avoid snaps, zippers and other metal. We may ask you to undress and put on a hospital gown.  If you have any questions, please call the Imaging Department where you will have your exam.            Aug 02, 2017 10:00 AM CDT   (Arrive by 9:45 AM)   RETURN TUMOR VISIT with Gerson Ravi MD   [facility] ([facility])    [address] [place] [address] [phone]              Further instructions from your care team       [facility]  Home Care Following Anesthesia  For 24 hours after surgery:  1. Get plenty of rest.  A responsible adult must stay with you for at least 24 hours after you leave the surgery center.  2. Do not drive or use heavy equipment.  If you have weakness or tingling, don't drive or use heavy equipment until this feeling goes away.   3. Do not drink alcohol.   4. Avoid strenuous or risky activities.  Ask for help when climbing stairs.  5. You may feel lightheaded.  IF so, sit for a few minutes before standing.  Have someone help you get up.   6. If you have nausea (feel sick to your stomach): Drink only clear liquids such as apple juice, ginger ale, broth or 7-Up.  Rest may also help.  Be sure to drink enough fluids.  Move to a regular diet as you feel able.   7. You may have a slight fever.  Call the doctor  "if your fever is over 100 F (37.7 C) (taken under the tongue) or lasts longer than 24 hours.  8. You may have a dry mouth, a sore throat, muscle aches or trouble sleeping. These should go away after 24 hours.  9. Do not make important or legal decisions.     Tips for taking pain medications  To get the best pain relief possible, remember these points:    Take pain medications as directed, before pain becomes severe.    Pain medication can upset your stomach: taking it with food may help.    Constipation is a common side effect of pain medication. Drink plenty of  fluids.    Eat foods high in fiber. Take a stool softener if recommended by your doctor or pharmacist.    Do not drink alcohol, drive or operate machinery while taking pain medications.    Ask about other ways to control pain, such as with heat, ice or relaxation.    Call a doctor for any of the followin. Signs of infection (fever, growing tenderness at the surgery site, a large amount of drainage or bleeding, severe pain, foul-smelling drainage, redness, swelling).  2. It has been over 8 to 10 hours since surgery and you are still not able to urinate (pass water).  3. Headache for over 24 hours.    Your doctor is:  Dr. Marcio Aggarwal, Prostate and Urology: [phone]   Or dial [phone] and ask for the resident on call for:  Prostate Urology  For emergency care, call the:  [facility]:  [phone] (TTY for hearing impaired: [phone])                Pending Results     No orders found from 2017 to 2017.            Admission Information     Date & Time Provider Department Dept. Phone    2017 Marcio Aggarwal MD [facility] [phone]      Your Vitals Were     Blood Pressure Temperature Respirations Height Weight Pulse Oximetry    113/46 98.4  F (36.9  C) (Oral) 13 1.778 m (5' 10\") 90.3 kg (199 lb) 95%    BMI (Body Mass Index)                   28.55 kg/m2           MyChart " Information     [employer] gives you secure access to your electronic health record. If you see a primary care provider, you can also send messages to your care team and make appointments. If you have questions, please call your primary care clinic.  If you do not have a primary care provider, please call [phone] and they will assist you.      [employer] is an electronic gateway that provides easy, online access to your medical records. With [employer], you can request a clinic appointment, read your test results, renew a prescription or communicate with your care team.     To access your existing account, please contact your [facility] or call [phone] for assistance.        Care EveryWhere ID     This is your Care EveryWhere ID. This could be used by other organizations to access your [facility] medical records  [id number]           Review of your medicines      START taking        Dose / Directions    HYDROcodone-acetaminophen 5-325 MG per tablet   Commonly known as:  NORCO   Used for:  Postoperative pain        Dose:  1-2 tablet   Take 1-2 tablets by mouth every 4 hours as needed for moderate to severe pain (Moderate to Severe Pain)   Quantity:  20 tablet   Refills:  0       senna-docusate 8.6-50 MG per tablet   Commonly known as:  SENOKOT-S;PERICOLACE   Used for:  Drug-induced constipation        Dose:  1-2 tablet   Take 1-2 tablets by mouth 2 times daily To prevent constipation while taking narcotic pain medication.   Quantity:  20 tablet   Refills:  0         CONTINUE these medicines which may have CHANGED, or have new prescriptions. If we are uncertain of the size of tablets/capsules you have at home, strength may be listed as something that might have changed.        Dose / Directions    atenolol 100 MG tablet   Commonly known as:  TENORMIN   This may have changed:  when to take this   Used for:  Hypertension, benign essential, goal below 140/90        Dose:  100 mg   Take 1  tablet (100 mg) by mouth daily   Quantity:  90 tablet   Refills:  3       glipiZIDE 2.5 MG 24 hr tablet   Commonly known as:  glipiZIDE XL   This may have changed:  when to take this   Used for:  Type 2 diabetes mellitus with diabetic polyneuropathy, without long-term current use of insulin (H)        Dose:  2.5 mg   Take 1 tablet (2.5 mg) by mouth daily   Quantity:  90 tablet   Refills:  1       metFORMIN 750 MG 24 hr tablet   Commonly known as:  GLUCOPHAGE-XR   This may have changed:  when to take this   Used for:  Type 2 diabetes mellitus with diabetic polyneuropathy, without long-term current use of insulin (H)        Dose:  1500 mg   Take 2 tablets (1,500 mg) by mouth daily   Quantity:  180 tablet   Refills:  3       multivitamin, therapeutic with minerals Tabs tablet   This may have changed:  when to take this   Used for:  Surgery aftercare        Dose:  1 tablet   Take 1 tablet by mouth daily.   Quantity:  30 each   Refills:  1       tamsulosin 0.4 MG capsule   Commonly known as:  FLOMAX   This may have changed:  when to take this   Used for:  Urgency of urination        Dose:  0.4 mg   Take 1 capsule (0.4 mg) by mouth daily   Quantity:  90 capsule   Refills:  3         CONTINUE these medicines which have NOT CHANGED        Dose / Directions    aspirin 81 MG tablet        Dose:  81 mg   Take 81 mg by mouth every other day Reported on 2/28/2017   Refills:  0       blood glucose lancets standard   Commonly known as:  no brand specified   Used for:  Type 2 diabetes mellitus with diabetic polyneuropathy (H)        Use to test blood sugar two times daily   Quantity:  1 Box   Refills:  3       blood glucose monitoring test strip   Commonly known as:  no brand specified   Used for:  Type 2 diabetes mellitus with diabetic polyneuropathy (H)        Use to test blood sugar two times daily   Quantity:  100 each   Refills:  2       sulfamethoxazole-trimethoprim 800-160 MG per tablet   Commonly known as:  BACTRIM DS    Used for:  UTI (urinary tract infection)        Dose:  1 tablet   Take 1 tablet by mouth 2 times daily   Quantity:  20 tablet   Refills:  0            Where to get your medicines      Some of these will need a paper prescription and others can be bought over the counter. Ask your nurse if you have questions.     Bring a paper prescription for each of these medications     HYDROcodone-acetaminophen 5-325 MG per tablet    senna-docusate 8.6-50 MG per tablet                Protect others around you: Learn how to safely use, store and throw away your medicines at www.disposemymeds.org.             Medication List: This is a list of all your medications and when to take them. Check marks below indicate your daily home schedule. Keep this list as a reference.      Medications           Morning Afternoon Evening Bedtime As Needed    aspirin 81 MG tablet   Take 81 mg by mouth every other day Reported on 2/28/2017                                atenolol 100 MG tablet   Commonly known as:  TENORMIN   Take 1 tablet (100 mg) by mouth daily                                blood glucose lancets standard   Commonly known as:  no brand specified   Use to test blood sugar two times daily                                blood glucose monitoring test strip   Commonly known as:  no brand specified   Use to test blood sugar two times daily                                glipiZIDE 2.5 MG 24 hr tablet   Commonly known as:  glipiZIDE XL   Take 1 tablet (2.5 mg) by mouth daily                                HYDROcodone-acetaminophen 5-325 MG per tablet   Commonly known as:  NORCO   Take 1-2 tablets by mouth every 4 hours as needed for moderate to severe pain (Moderate to Severe Pain)                                metFORMIN 750 MG 24 hr tablet   Commonly known as:  GLUCOPHAGE-XR   Take 2 tablets (1,500 mg) by mouth daily                                multivitamin, therapeutic with minerals Tabs tablet   Take 1 tablet by mouth daily.                                 senna-docusate 8.6-50 MG per tablet   Commonly known as:  SENOKOT-S;PERICOLACE   Take 1-2 tablets by mouth 2 times daily To prevent constipation while taking narcotic pain medication.                                sulfamethoxazole-trimethoprim 800-160 MG per tablet   Commonly known as:  BACTRIM DS   Take 1 tablet by mouth 2 times daily                                tamsulosin 0.4 MG capsule   Commonly known as:  FLOMAX   Take 1 capsule (0.4 mg) by mouth daily

## 2017-05-23 NOTE — BRIEF OP NOTE
[facility]    Brief Operative Note    Pre-operative diagnosis: Left Varicocele; Testicular pain  Post-operative diagnosis Same  Procedure: Procedure(s):  Left Varicocele Repair, Denervation of Left Testis - Wound Class: I-Clean   - Wound Class: I-Clean  Surgeon: Surgeon(s) and Role:     * Marcio Aggarwal MD - Primary       Joselito Palomo MD - Assistant  Anesthesia: General   Estimated blood loss: Less than 10 ml  Drains: None  Specimens:   ID Type Source Tests Collected by Time Destination   A : Left Vas Deferens Tissue Vas Deferens, Left SURGICAL PATHOLOGY EXAM Marcio Aggarwal MD 5/23/2017  1:41 PM      Findings:   Varicocelectomy performed with preservation of 1 robust artery, 2 lymphatics and 2 veins; Notably dense/calcified vas deferens with segment resected  Complications: None.  Implants: None.

## 2017-05-23 NOTE — DISCHARGE INSTRUCTIONS
[facility]  Home Care Following Anesthesia  For 24 hours after surgery:  1. Get plenty of rest.  A responsible adult must stay with you for at least 24 hours after you leave the surgery center.  2. Do not drive or use heavy equipment.  If you have weakness or tingling, don't drive or use heavy equipment until this feeling goes away.   3. Do not drink alcohol.   4. Avoid strenuous or risky activities.  Ask for help when climbing stairs.  5. You may feel lightheaded.  IF so, sit for a few minutes before standing.  Have someone help you get up.   6. If you have nausea (feel sick to your stomach): Drink only clear liquids such as apple juice, ginger ale, broth or 7-Up.  Rest may also help.  Be sure to drink enough fluids.  Move to a regular diet as you feel able.   7. You may have a slight fever.  Call the doctor if your fever is over 100 F (37.7 C) (taken under the tongue) or lasts longer than 24 hours.  8. You may have a dry mouth, a sore throat, muscle aches or trouble sleeping. These should go away after 24 hours.  9. Do not make important or legal decisions.     Tips for taking pain medications  To get the best pain relief possible, remember these points:    Take pain medications as directed, before pain becomes severe.    Pain medication can upset your stomach: taking it with food may help.    Constipation is a common side effect of pain medication. Drink plenty of  fluids.    Eat foods high in fiber. Take a stool softener if recommended by your doctor or pharmacist.    Do not drink alcohol, drive or operate machinery while taking pain medications.    Ask about other ways to control pain, such as with heat, ice or relaxation.    Call a doctor for any of the followin. Signs of infection (fever, growing tenderness at the surgery site, a large amount of drainage or bleeding, severe pain, foul-smelling drainage, redness, swelling).  2. It has been over 8 to 10 hours since  surgery and you are still not able to urinate (pass water).  3. Headache for over 24 hours.    Your doctor is:  Dr. Marcio Aggarwal, Prostate and Urology: [phone]   Or dial [phone] and ask for the resident on call for:  Prostate Urology  For emergency care, call the:  [facility]:  [phone] (TTY for hearing impaired: [phone])

## 2017-05-23 NOTE — ANESTHESIA POSTPROCEDURE EVALUATION
Patient: Antoine Russo    Procedure(s):  Left Varicocele Repair, Denervation of Left Testis - Wound Class: I-Clean   - Wound Class: I-Clean    Diagnosis:Left Varicocele  Diagnosis Additional Information: No value filed.    Anesthesia Type:  General, LMA, ETT    Note:  Anesthesia Post Evaluation    Patient location during evaluation: PACU  Patient participation: Able to fully participate in evaluation  Level of consciousness: awake and alert  Pain management: adequate  Airway patency: patent  Cardiovascular status: acceptable  Respiratory status: acceptable  Hydration status: acceptable  PONV: none     Anesthetic complications: None          Last vitals:  Vitals:    05/23/17 1545 05/23/17 1555 05/23/17 1615   BP: 108/51 98/47 103/52   Resp: 16 16 16   Temp:  36.8  C (98.3  F) 36.8  C (98.3  F)   SpO2: 92% 93% 95%         Electronically Signed By: Kera Mckeon MD  May 23, 2017  4:41 PM

## 2017-05-23 NOTE — IP AVS SNAPSHOT
[facility]    [address] [place] [address]    Phone:  [phone]    Fax:  [phone]                                       After Visit Summary   5/23/2017    Antoine Russo    MRN: [id number]           After Visit Summary Signature Page     I have received my discharge instructions, and my questions have been answered. I have discussed any challenges I see with this plan with the nurse or doctor.    ..........................................................................................................................................  Patient/Patient Representative Signature      ..........................................................................................................................................  Patient Representative Print Name and Relationship to Patient    ..................................................               ................................................  Date                                            Time    ..........................................................................................................................................  Reviewed by Signature/Title    ...................................................              ..............................................  Date                                                            Time

## 2017-05-23 NOTE — LETTER
"Antoine Christian   [address] [address]        Dear Antoine     I am writing you concerning your recent test results:    Your vas deferens did show calcification/scarring.  The cause for this is not certain.  I think this inflammation/scar may have been contributing to your pain.  Hopefully the surgery will help with the discomfort.  I'm sending you this info for your records.    Results for orders placed or performed during the hospital encounter of 05/23/17   Glucose by meter   Result Value Ref Range    Glucose 148 (H) 70 - 99 mg/dL   Surgical pathology exam   Result Value Ref Range    Copath Report       Patient Name: ANTOINE CHRISTIAN  MR#: [id number]  Specimen #: [id number]  Collected: 5/23/2017  Received: 5/24/2017  Reported: 5/25/2017 18:48  Ordering Phy(s): RAYNA SIDDIQUI    For improved result formatting, select 'View Enhanced Report Format'  under Linked Documents section.    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    Electronically signed out by:    Tania Mims M.D., [facility] [[email or web address]]    CLINICAL HISTORY:  71 year old male with varicocele.    GROSS:  The specimen is received in formalin with proper patient identification,  labeled \"left vas deferens\", and consists of a 0.6 cm in length x 0.2 cm  in diameter tubular portion of pale gray, rubbery soft tissue.  The  specimen is wrapped and submitted in toto  to histology for routine  processing, sectioning, and embedding. (Dictated by: Mariah Orellana  5/24/2017 03:09 PM)    MICROSCOPIC:  Microscopic examination was performed.    CPT Codes:  A: 27050-AG8    TESTING LAB LOCATION:  [facility], [address] " [place]   [id number] [phone]    COLLECTION SITE:  Client: [facility], [place]  Location: [facility] (B)     Glucose by meter   Result Value Ref Range    Glucose 160 (H) 70 - 99 mg/dL       .      Sincerely,      Marcio SANTOS [facility]  [address]  [address] [facility] [address]  Phone: [phone]  Fax: [phone]

## 2017-05-23 NOTE — ANESTHESIA CARE TRANSFER NOTE
Patient: Antoine Russo    Procedure(s):  Left Varicocele Repair, Denervation of Left Testis - Wound Class: I-Clean   - Wound Class: I-Clean    Diagnosis: Left Varicocele  Diagnosis Additional Information: No value filed.    Anesthesia Type:   General, LMA, ETT     Note:  Airway :Face Mask  Patient transferred to:PACU  Comments: Patient awake and breathing spont. VSS. Report to RN. No complaints of pain or nausea.      Vitals: (Last set prior to Anesthesia Care Transfer)    CRNA VITALS  5/23/2017 1455 - 5/23/2017 1531      5/23/2017             Pulse: 71    SpO2: 98 %    Resp Rate (observed): 21                Electronically Signed By: FERNANDO Macias CRNA  May 23, 2017  3:31 PM

## 2017-05-24 NOTE — OP NOTE
OPERATIVE REPORT    PREOPERATIVE DIAGNOSIS: Chronic left testicular pain; Left grade III varicocele  POSTOPERATIVE DIAGNOSIS: Same as above    PROCEDURE PERFORMED: Left varicocelectomy and cord denervation using microscope.     STAFF SURGEON: Marcio Aggarwal MD. Please note that Dr. Aggarwal was present and scrubbed for the entire procedure.     RESIDENT SURGEON:  Joselito Palomo MD    ANESTHESIA: General endotracheal.     ESTIMATED BLOOD LOSS: 10 mL.    SPECIMENS: Vas deferens    COMPLICATIONS: None apparent     SIGNIFICANT FINDINGS:   Left side: Spared 1 robust artery, 2 lymphatic channels and 2 veins; Notably dense/calcified vas deferens with segment resected    INDICATIONS FOR PROCEDURE: Antoine Russo is a(n) 71 year old gentleman who presented for evaluation of chronic scrotal pain. He was found to have an abnormal semen analysis and left varicocele and has elected for repair.  The risks, benefits, alternatives were discussed with the patient and he was consented on an elective basis to have this done.     DESCRIPTION OF PROCEDURE: After obtaining informed consent, properly marking and identifying the patient in the preoperative area, he was brought to the operating room, placed on the operating room table in the supine position. General anesthesia was obtained. Pneumoboots and preoperative antibiotics were administered. The patient was subsequently shaved, prepped and draped in the standard sterile fashion. A timeout was performed verifying the patient and procedure prior to beginning.   We began the procedure started by marking 3 cm subinguinal incision on the left side below the external ring. Injection of 0.5% Marcaine was used for local anesthesia before incision.  Electrocautery was used to carry dissection down to aleksandr's fascia which was subsequently opened bluntly using the Metzenbaum scissors exposing the cord. Blunt dissection was continued proximally and distally to free up the cord and bring it up  to the incision. The prepubic area and tissue surrounding the spermatic cord were examined carefully looking for any additional vessels or veins.  Attempt was made to identify the ilioinguinal nerve however it could not be clearly seen.  An [employer] retractor was used to hold the cord out of the incision for the remainder of the procedure.  The Microscope was then brought into the field.    Spermatic fascia and cremasteric muscle fibers were opened using blunt dissection and electrocautery in the direction of the cord. Bundles of the cremasteric fibers were isolated laterally and medially with vessel loops and were pulled on the respective directions to aid in retraction of the cord and expose the vascular elements. Next, the vas deferens and its associated vessels and lymphatics were identified.  The vas was found to be hard to touch.  In light of this finding, a small segment of the vas deferens was resected and sent to pathology. We then used a Doppler, 20 Hz probe to identify areas of arterial flow. Within the cord structures themselves, we were able to identify and preserve 1 robust artery.  We also successfully identified 2 lymphatic channels that were preserved.  The remaining veins within the cord were ligated using 4-0 silk ties.    Meticulous dissection with blunt dissection and bipolar cautery was then used to skeletonize the perivascular tissues to remove any nerve fibers.   The cremasteric muscle fibers were subsequently also ligated and divided using 2-0 silk ties.  Following this, hemostasis was obtained using bipolar electrocautery and excellent hemostasis was noted. Irrigation was performed and the cord was placed back into the left prepubic area.  The incision was then closed beginning with closure of Arlette's fascia in a deep dermal fashion using interrupted 3-0 chromic sutures. The skin was then closed with a 4-0 Monocryl in a running subcuticular fashion. Steri-Strips were placed after  injection of additional 0.5% Marcaine. Primapore dressing applied.  The patient was awakened from anesthesia and brought to the PACU in stable condition. The patient tolerated the procedure well and no intraoperative complications were noted.     I was present and scrubbed for the entire procedure.  Marcio Aggarwal MD  Urology Staff

## 2017-05-25 LAB — COPATH REPORT: NORMAL

## 2017-06-12 ENCOUNTER — PRE VISIT (OUTPATIENT)
Dept: UROLOGY | Facility: CLINIC | Age: 72
End: 2017-06-12

## 2017-06-12 NOTE — TELEPHONE ENCOUNTER
Patient coming in for Left varicocelectomy and cord denervation surgical follow up. No semen analysis needed. Records in epic. No need to contact patient

## 2017-06-22 ENCOUNTER — OFFICE VISIT (OUTPATIENT)
Dept: UROLOGY | Facility: CLINIC | Age: 72
End: 2017-06-22

## 2017-06-22 VITALS
BODY MASS INDEX: 28.35 KG/M2 | WEIGHT: 198 LBS | SYSTOLIC BLOOD PRESSURE: 127 MMHG | HEART RATE: 72 BPM | HEIGHT: 70 IN | DIASTOLIC BLOOD PRESSURE: 76 MMHG

## 2017-06-22 DIAGNOSIS — I86.1 LEFT VARICOCELE: Primary | ICD-10-CM

## 2017-06-22 ASSESSMENT — PAIN SCALES - GENERAL: PAINLEVEL: NO PAIN (0)

## 2017-06-22 NOTE — NURSING NOTE
Chief Complaint   Patient presents with     Surgical Followup     4 weeks post op     Madelin Thapa RN

## 2017-06-22 NOTE — MR AVS SNAPSHOT
After Visit Summary   6/22/2017    Antoine Russo    MRN: [id number]           Patient Information     Date Of Birth          1945        Visit Information        Provider Department      6/22/2017 8:40 AM Marcio Aggarwal MD [facility] and [facility]        Today's Diagnoses     Left varicocele    -  1       Follow-ups after your visit        Follow-up notes from your care team     Return if symptoms worsen or fail to improve.      Your next 10 appointments already scheduled     Jul 13, 2017  8:00 AM CDT   Office Visit with Katie Gross MD   [facility] ([facility]    [address]  [facility] [id number] [phone]           Bring a current list of meds and any records pertaining to this visit.  For Physicals, please bring immunization records and any forms needing to be filled out.  Please arrive 10 minutes early to complete paperwork.            Jul 31, 2017  8:00 AM CDT   CT CHEST W CONTRAST with WYCT1   [facility] CT ([facility])    [address] [facility] [address]   [phone]           Please bring any scans or X-rays taken at other hospitals, if similar tests were done. Also bring a list of your medicines, including vitamins, minerals and over-the-counter drugs. It is safest to leave personal items at home.  Be sure to tell your doctor:   If you have any allergies.   If there s any chance you are pregnant.   If you are breastfeeding.   If you have any special needs.  You will have contrast for this exam. To prepare:   Do not eat or drink for 2 hours before your exam. If you need to take medicine, you may take it with small sips of water. (We may ask you to take liquid medicine as well.)   The day before your exam, drink extra fluids at least six 8-ounce glasses (unless your doctor tells you to restrict your fluids).  Patients over 70 or patients with diabetes or kidney  problems:   If you haven t had a blood test (creatinine test) within the last 30 days, go to your clinic or Diagnostic Imaging Department for this test.  If you have diabetes:   If your kidney function is normal, continue taking your metformin (Avandamet, Glucophage, Glucovance, Metaglip) on the day of your exam.   If your kidney function is abnormal, wait 48 hours before restarting this medicine.  Please wear loose clothing, such as a sweat suit or jogging clothes. Avoid snaps, zippers and other metal. We may ask you to undress and put on a hospital gown.  If you have any questions, please call the Imaging Department where you will have your exam.            Jul 31, 2017  8:30 AM CDT   CT SOFT TISSUE NECK W CONTRAST with WYCT1   [facility] CT ([facility])    [address] [facility] [address] [phone]           Please bring any scans or X-rays taken at other hospitals, if similar tests were done. Also bring a list of your medicines, including vitamins, minerals and over-the-counter drugs. It is safest to leave personal items at home.  Be sure to tell your doctor:   If you have any allergies.   If there s any chance you are pregnant.   If you are breastfeeding.   If you have any special needs.  You will have contrast for this exam. To prepare:   Do not eat or drink for 2 hours before your exam. If you need to take medicine, you may take it with small sips of water. (We may ask you to take liquid medicine as well.)   The day before your exam, drink extra fluids at least six 8-ounce glasses (unless your doctor tells you to restrict your fluids).  Patients over 70 or patients with diabetes or kidney problems:   If you haven t had a blood test (creatinine test) within the last 30 days, go to your clinic or Diagnostic Imaging Department for this test.  If you have diabetes:   If your kidney function is normal, continue taking your metformin (Avandamet, Glucophage, Glucovance,  Metaglip) on the day of your exam.   If your kidney function is abnormal, wait 48 hours before restarting this medicine.  Please wear loose clothing, such as a sweat suit or jogging clothes. Avoid snaps, zippers and other metal. We may ask you to undress and put on a hospital gown.  If you have any questions, please call the Imaging Department where you will have your exam.            Aug 02, 2017 10:00 AM CDT   (Arrive by 9:45 AM)   RETURN TUMOR VISIT with Gerson Ravi MD   [facility] ([facility])    [address]  [address] [facility] [address] [phone]              Who to contact     Please call your clinic at [phone] to:    Ask questions about your health    Make or cancel appointments    Discuss your medicines    Learn about your test results    Speak to your doctor   If you have compliments or concerns about an experience at your clinic, or if you wish to file a complaint, please contact [facility] Patient Relations at [phone] or email us at [email or web address]         Additional Information About Your Visit        [employer] Information     [employer] gives you secure access to your electronic health record. If you see a primary care provider, you can also send messages to your care team and make appointments. If you have questions, please call your primary care clinic.  If you do not have a primary care provider, please call [phone] and they will assist you.      [employer] is an electronic gateway that provides easy, online access to your medical records. With [employer], you can request a clinic appointment, read your test results, renew a prescription or communicate with your care team.     To access your existing account, please contact your [facility] Clinic or call [phone] for assistance.        Care EveryWhere ID     This is your Care EveryWhere ID. This could be used by  "other organizations to access your [facility] medical records  [id number]        Your Vitals Were     Pulse Height BMI (Body Mass Index)             72 1.778 m (5' 10\") 28.41 kg/m2          Blood Pressure from Last 3 Encounters:   06/22/17 127/76   05/23/17 103/52   05/09/17 163/87    Weight from Last 3 Encounters:   06/22/17 89.8 kg (198 lb)   05/23/17 90.3 kg (199 lb)   05/09/17 90.7 kg (199 lb 14.4 oz)              Today, you had the following     No orders found for display         Today's Medication Changes          These changes are accurate as of: 6/22/17  9:19 AM.  If you have any questions, ask your nurse or doctor.               These medicines have changed or have updated prescriptions.        Dose/Directions    atenolol 100 MG tablet   Commonly known as:  TENORMIN   This may have changed:  when to take this   Used for:  Hypertension, benign essential, goal below 140/90        Dose:  100 mg   Take 1 tablet (100 mg) by mouth daily   Quantity:  90 tablet   Refills:  3       glipiZIDE 2.5 MG 24 hr tablet   Commonly known as:  glipiZIDE XL   This may have changed:  when to take this   Used for:  Type 2 diabetes mellitus with diabetic polyneuropathy, without long-term current use of insulin (H)        Dose:  2.5 mg   Take 1 tablet (2.5 mg) by mouth daily   Quantity:  90 tablet   Refills:  1       metFORMIN 750 MG 24 hr tablet   Commonly known as:  GLUCOPHAGE-XR   This may have changed:  when to take this   Used for:  Type 2 diabetes mellitus with diabetic polyneuropathy, without long-term current use of insulin (H)        Dose:  1500 mg   Take 2 tablets (1,500 mg) by mouth daily   Quantity:  180 tablet   Refills:  3       multivitamin, therapeutic with minerals Tabs tablet   This may have changed:  when to take this   Used for:  Surgery aftercare        Dose:  1 tablet   Take 1 tablet by mouth daily.   Quantity:  30 each   Refills:  1       tamsulosin 0.4 MG capsule   Commonly known as:  FLOMAX   This may have " changed:  when to take this   Used for:  Urgency of urination        Dose:  0.4 mg   Take 1 capsule (0.4 mg) by mouth daily   Quantity:  90 capsule   Refills:  3                Primary Care Provider Office Phone # Fax #    Katie Gross [phone]       [facility] [address] [facility] [address]        Equal Access to Services     [facility] AH: Hadii aad ku hadasho Soomaali, waaxda luqadaha, qaybta kaalmada adeegyada, waxay idiin hayaan adeeg kharash la'aan ah. So [facility] [phone].    ATENCIÓN: Si habla español, tiene a snyder disposición servicios gratuitos de asistencia lingüística. Kia al [phone].    We comply with applicable federal civil rights laws and [place] laws. We do not discriminate on the basis of race, color, national origin, age, disability sex, sexual orientation or gender identity.            Thank you!     Thank you for choosing [facility] UROLOGY AND [facility] PR[facility]  for your care. Our goal is always to provide you with excellent care. Hearing back from our patients is one way we can continue to improve our services. Please take a few minutes to complete the written survey that you may receive in the mail after your visit with us. Thank you!             Your Updated Medication List - Protect others around you: Learn how to safely use, store and throw away your medicines at www.disposemymeds.org.          This list is accurate as of: 6/22/17  9:19 AM.  Always use your most recent med list.                   Brand Name Dispense Instructions for use Diagnosis    aspirin 81 MG tablet      Take 81 mg by mouth every other day Reported on 2/28/2017        atenolol 100 MG tablet    TENORMIN    90 tablet    Take 1 tablet (100 mg) by mouth daily    Hypertension, benign essential, goal below 140/90       blood glucose lancets standard    no brand specified    1 Box    Use to test blood sugar two times daily    Type 2 diabetes mellitus with  diabetic polyneuropathy (H)       blood glucose monitoring test strip    no brand specified    100 each    Use to test blood sugar two times daily    Type 2 diabetes mellitus with diabetic polyneuropathy (H)       glipiZIDE 2.5 MG 24 hr tablet    glipiZIDE XL    90 tablet    Take 1 tablet (2.5 mg) by mouth daily    Type 2 diabetes mellitus with diabetic polyneuropathy, without long-term current use of insulin (H)       metFORMIN 750 MG 24 hr tablet    GLUCOPHAGE-XR    180 tablet    Take 2 tablets (1,500 mg) by mouth daily    Type 2 diabetes mellitus with diabetic polyneuropathy, without long-term current use of insulin (H)       multivitamin, therapeutic with minerals Tabs tablet     30 each    Take 1 tablet by mouth daily.    Surgery aftercare       sulfamethoxazole-trimethoprim 800-160 MG per tablet    BACTRIM DS    20 tablet    Take 1 tablet by mouth 2 times daily    UTI (urinary tract infection)       tamsulosin 0.4 MG capsule    FLOMAX    90 capsule    Take 1 capsule (0.4 mg) by mouth daily    Urgency of urination

## 2017-06-22 NOTE — PROGRESS NOTES
CC: Antoine Russo is post-op from microscopic denervation of the left spermatic cord   HPI: Patient is 4  wks post op.  he has been doing well. No fevers or chills. Pain decreasing.   Exam: Afebrile. NAD.   Incision  healing well. No discharge, erythema or fluctuence suggestive of infection.   His testis is completely nontender.  No pain at all now.  No hydrocele.  No new testis atrophy.  Varicocele resolved.  He's very happy    PLAN: F/U PRN.     CC: Andrew

## 2017-06-22 NOTE — LETTER
6/22/2017       RE: Antoine Russo  [address] [address]     Dear Colleague,    Thank you for referring your patient, Antoine Russo, to the [facility] UROLOGY AND INST FOR PROSTATE AND UROLOGIC CANCERS at [facility]. Please see a copy of my visit note below.    CC: Antoine Russo is post-op from microscopic denervation of the left spermatic cord   HPI: Patient is 4  wks post op.  he has been doing well. No fevers or chills. Pain decreasing.   Exam: Afebrile. NAD.   Incision  healing well. No discharge, erythema or fluctuence suggestive of infection.   His testis is completely nontender.  No pain at all now.  No hydrocele.  No new testis atrophy.  Varicocele resolved.  He's very happy    PLAN: F/U PRN.     CC: Andrew    Again, thank you for allowing me to participate in the care of your patient.      Sincerely,    Marcio Aggarwal MD

## 2017-07-13 ENCOUNTER — OFFICE VISIT (OUTPATIENT)
Dept: FAMILY MEDICINE | Facility: CLINIC | Age: 72
End: 2017-07-13
Payer: COMMERCIAL

## 2017-07-13 VITALS
BODY MASS INDEX: 27.98 KG/M2 | TEMPERATURE: 98 F | WEIGHT: 195 LBS | OXYGEN SATURATION: 93 % | DIASTOLIC BLOOD PRESSURE: 82 MMHG | HEART RATE: 81 BPM | SYSTOLIC BLOOD PRESSURE: 138 MMHG

## 2017-07-13 DIAGNOSIS — E78.5 HYPERLIPIDEMIA LDL GOAL <130: ICD-10-CM

## 2017-07-13 DIAGNOSIS — I10 HYPERTENSION, BENIGN ESSENTIAL, GOAL BELOW 140/90: ICD-10-CM

## 2017-07-13 DIAGNOSIS — E11.42 TYPE 2 DIABETES MELLITUS WITH DIABETIC POLYNEUROPATHY, WITHOUT LONG-TERM CURRENT USE OF INSULIN (H): Primary | ICD-10-CM

## 2017-07-13 LAB
CHOLEST SERPL-MCNC: 170 MG/DL
ERYTHROCYTE [DISTWIDTH] IN BLOOD BY AUTOMATED COUNT: 16.3 % (ref 10–15)
HBA1C MFR BLD: 6.4 % (ref 4.3–6)
HCT VFR BLD AUTO: 43.3 % (ref 40–53)
HDLC SERPL-MCNC: 30 MG/DL
HGB BLD-MCNC: 13.9 G/DL (ref 13.3–17.7)
LDLC SERPL CALC-MCNC: 100 MG/DL
MCH RBC QN AUTO: 32.4 PG (ref 26.5–33)
MCHC RBC AUTO-ENTMCNC: 32.1 G/DL (ref 31.5–36.5)
MCV RBC AUTO: 101 FL (ref 78–100)
NONHDLC SERPL-MCNC: 140 MG/DL
PLATELET # BLD AUTO: 276 10E9/L (ref 150–450)
RBC # BLD AUTO: 4.29 10E12/L (ref 4.4–5.9)
TRIGL SERPL-MCNC: 200 MG/DL
WBC # BLD AUTO: 9.9 10E9/L (ref 4–11)

## 2017-07-13 PROCEDURE — 36415 COLL VENOUS BLD VENIPUNCTURE: CPT | Performed by: FAMILY MEDICINE

## 2017-07-13 PROCEDURE — 80061 LIPID PANEL: CPT | Performed by: FAMILY MEDICINE

## 2017-07-13 PROCEDURE — 99214 OFFICE O/P EST MOD 30 MIN: CPT | Performed by: FAMILY MEDICINE

## 2017-07-13 PROCEDURE — 85027 COMPLETE CBC AUTOMATED: CPT | Performed by: FAMILY MEDICINE

## 2017-07-13 PROCEDURE — 83036 HEMOGLOBIN GLYCOSYLATED A1C: CPT | Performed by: FAMILY MEDICINE

## 2017-07-13 NOTE — PROGRESS NOTES
"  SUBJECTIVE:                                                    Antoine Russo is a 71 year old male who presents to clinic today for the following health issues:      Diabetes Follow-up      Patient is checking blood sugars: not at all    Diabetic concerns: None     Symptoms of hypoglycemia (low blood sugar): none     Paresthesias (numbness or burning in feet) or sores: Yes hurt and numb all the time     Date of last diabetic eye exam: 4/13/2017  Lab Results   Component Value Date    A1C 8.9 04/04/2017    A1C 8.0 01/03/2017    A1C 6.6 07/15/2016    A1C 7.8 04/04/2016    A1C 6.9 03/14/2016   Metformin 1500 mg, glipizide 2.5 mg  He does not check is blood sugars. He \"can feel if [he] is not feeling right,\" but he does not check them even then. He denies any low blood sugars.     Hyperlipidemia Follow-Up      Rate your low fat/cholesterol diet?: not monitoring fat    Taking statin?  No    Other lipid medications/supplements?:  none  Recent Labs   Lab Test  07/15/16   0904  01/18/16   0843  04/14/15   0853  05/15/14   0832   CHOL  180  152  144  165   HDL  43  39*  27*  30*   LDL  110*  86  76  75   TRIG  134  136  206*  302*   CHOLHDLRATIO   --    --   5.3*  5.0   He is fasting this morning for labs.     Hypertension Follow-up      Outpatient blood pressures are not being checked.    Low Salt Diet: not monitoring salt    Atenolol 100 mg  BP Readings from Last 6 Encounters:   07/13/17 138/82   06/22/17 127/76   05/23/17 103/52   05/09/17 163/87   04/04/17 139/80   03/10/17 150/76         Amount of exercise or physical activity: none    Problems taking medications regularly: No    Medication side effects: none    Diet: regular (no restrictions)      Problem list and histories reviewed & adjusted, as indicated.  Additional history: as documented    Recent Labs   Lab Test  07/13/17   0828  05/09/17   1021  04/04/17   0831  01/03/17   0837   07/15/16   0904   03/31/16   0545   01/18/16   0843   04/14/15   0853   A1C  " 6.4*   --   8.9*  8.0*   --   6.6*   < >   --    < >  6.6*   < >  8.5*   LDL   --    --    --    --    --   110*   --    --    --   86   --   76   HDL   --    --    --    --    --   43   --    --    --   39*   --   27*   TRIG   --    --    --    --    --   134   --    --    --   136   --   206*   ALT   --    --    --   22   --   24   --   16   < >  59   < >  71*   CR   --   0.77   --   0.76   --   0.64*   < >  0.89   < >  0.67   < >  0.66   GFRESTIMATED   --   >90  Non  GFR Calc     --   >90  Non  GFR Calc     < >  >90  Non  GFR Calc     < >  85   < >  >90  Non  GFR Calc     < >  >90  Non  GFR Calc     GFRESTBLACK   --   >90   GFR Calc     --   >90   GFR Calc     < >  >90   GFR Calc     < >  >90   GFR Calc     < >  >90   GFR Calc     < >  >90   GFR Calc     POTASSIUM   --   4.3   --   4.0   --   4.4   < >  4.2   < >  4.8   < >  4.2   TSH   --    --    --   1.37   --    --    --    --    --   1.51   --   1.59    < > = values in this interval not displayed.      BP Readings from Last 3 Encounters:   07/13/17 138/82   06/22/17 127/76   05/23/17 103/52    Wt Readings from Last 3 Encounters:   07/13/17 88.5 kg (195 lb)   06/22/17 89.8 kg (198 lb)   05/23/17 90.3 kg (199 lb)         Reviewed and updated as needed this visit by clinical staff  Tobacco  Allergies  Meds  Problems       Reviewed and updated as needed this visit by Provider  Allergies  Meds  Problems         ROS:  C: NEGATIVE for fever, chills, change in weight  E/M: NEGATIVE for ear, mouth and throat problems  R: NEGATIVE for significant cough or SOB  CV: NEGATIVE for chest pain, palpitations or peripheral edema  GI: NEGATIVE for nausea, abdominal pain, heartburn, or change in bowel habits  : negative for dysuria, hematuria, decreased urinary stream    OBJECTIVE:   /82   Pulse 81  Temp 98  F (36.7  C) (Tympanic)  Wt 88.5 kg (195 lb)  SpO2 93%  BMI 27.98 kg/m2  Body mass index is 27.98 kg/(m^2).  GENERAL: healthy, alert and no distress  HENT: ear canals and TM's normal, nose and mouth without ulcers or lesions  NECK: no adenopathy, no asymmetry, masses, or scars and thyroid normal to palpation  RESP: lungs clear to auscultation - no rales, rhonchi or wheezes  CV: regular rate and rhythm, normal S1 S2, no S3 or S4, no murmur, click or rub, no peripheral edema and peripheral pulses strong  ABDOMEN: soft, nontender, no hepatosplenomegaly, no masses and bowel sounds normal  SKIN: no suspicious lesions or rashes. Scattered seborrheic keratosis on back, chest.    Diagnostic Test Results:  Results for orders placed or performed in visit on 07/13/17 (from the past 24 hour(s))   Hemoglobin A1c   Result Value Ref Range    Hemoglobin A1C 6.4 (H) 4.3 - 6.0 %   CBC with platelets   Result Value Ref Range    WBC 9.9 4.0 - 11.0 10e9/L    RBC Count 4.29 (L) 4.4 - 5.9 10e12/L    Hemoglobin 13.9 13.3 - 17.7 g/dL    Hematocrit 43.3 40.0 - 53.0 %     (H) 78 - 100 fl    MCH 32.4 26.5 - 33.0 pg    MCHC 32.1 31.5 - 36.5 g/dL    RDW 16.3 (H) 10.0 - 15.0 %    Platelet Count 276 150 - 450 10e9/L       ASSESSMENT/PLAN:   Antoine was seen today for diabetes.    Diagnoses and all orders for this visit:    Type 2 diabetes mellitus with diabetic polyneuropathy, without long-term current use of insulin (H)  -     Hemoglobin A1c  -     CBC with platelets    Hypertension, benign essential, goal below 140/90  -     CBC with platelets    Hyperlipidemia LDL goal <130  -     Lipid panel reflex to direct LDL  -     CBC with platelets        Patient Instructions     See you back in 6 months    Lab Results   Component Value Date    A1C 6.4 07/13/2017    A1C 8.9 04/04/2017    A1C 8.0 01/03/2017    A1C 6.6 07/15/2016    A1C 7.8 04/04/2016           This document serves as a record of the services and decisions  personally performed and made by Ktaie Gross MD. It was created on her behalf by Ana Pereira, a trained medical scribe. The creation of this document is based the provider's statements to the medical scribe.  Ana Pereira 8:24 AM 7/13/2017    Provider:   The information in this document, created by the medical scribe for me, accurately reflects the services I personally performed and the decisions made by me. I have reviewed and approved this document for accuracy prior to leaving the patient care area.  Katie Gross MD 8:24 AM 7/13/2017    Katie Gross MD  [facility]

## 2017-07-13 NOTE — MR AVS SNAPSHOT
After Visit Summary   7/13/2017    Antoine Russo    MRN: [id number]           Patient Information     Date Of Birth          1945        Visit Information        Provider Department      7/13/2017 8:00 AM Katie Gross MD [facility]        Today's Diagnoses     Type 2 diabetes mellitus with diabetic polyneuropathy, without long-term current use of insulin (H)    -  1    Pancreatic duct leak        Hypertension, benign essential, goal below 140/90        Gastroesophageal reflux disease without esophagitis        Hyperlipidemia LDL goal <130          Care Instructions    See you back in 3-6 months    Lab Results   Component Value Date    A1C 6.4 07/13/2017    A1C 8.9 04/04/2017    A1C 8.0 01/03/2017    A1C 6.6 07/15/2016    A1C 7.8 04/04/2016               Follow-ups after your visit        Your next 10 appointments already scheduled     Jul 31, 2017  8:00 AM CDT   CT CHEST W CONTRAST with [email or web address]   [facility] CT ([facility])    [address] [facility] [address]   [phone]           Please bring any scans or X-rays taken at other hospitals, if similar tests were done. Also bring a list of your medicines, including vitamins, minerals and over-the-counter drugs. It is safest to leave personal items at home.  Be sure to tell your doctor:   If you have any allergies.   If there s any chance you are pregnant.   If you are breastfeeding.   If you have any special needs.  You will have contrast for this exam. To prepare:   Do not eat or drink for 2 hours before your exam. If you need to take medicine, you may take it with small sips of water. (We may ask you to take liquid medicine as well.)   The day before your exam, drink extra fluids at least six 8-ounce glasses (unless your doctor tells you to restrict your fluids).  Patients over 70 or patients with diabetes or kidney problems:   If you haven t had a blood test (creatinine test) within the  last 30 days, go to your clinic or Diagnostic Imaging Department for this test.  If you have diabetes:   If your kidney function is normal, continue taking your metformin (Avandamet, Glucophage, Glucovance, Metaglip) on the day of your exam.   If your kidney function is abnormal, wait 48 hours before restarting this medicine.  Please wear loose clothing, such as a sweat suit or jogging clothes. Avoid snaps, zippers and other metal. We may ask you to undress and put on a hospital gown.  If you have any questions, please call the Imaging Department where you will have your exam.            Jul 31, 2017  8:30 AM CDT   CT SOFT TISSUE NECK W CONTRAST with WYCT1   [facility] CT ([facility])    [address] [facility] [address] [phone]           Please bring any scans or X-rays taken at other hospitals, if similar tests were done. Also bring a list of your medicines, including vitamins, minerals and over-the-counter drugs. It is safest to leave personal items at home.  Be sure to tell your doctor:   If you have any allergies.   If there s any chance you are pregnant.   If you are breastfeeding.   If you have any special needs.  You will have contrast for this exam. To prepare:   Do not eat or drink for 2 hours before your exam. If you need to take medicine, you may take it with small sips of water. (We may ask you to take liquid medicine as well.)   The day before your exam, drink extra fluids at least six 8-ounce glasses (unless your doctor tells you to restrict your fluids).  Patients over 70 or patients with diabetes or kidney problems:   If you haven t had a blood test (creatinine test) within the last 30 days, go to your clinic or Diagnostic Imaging Department for this test.  If you have diabetes:   If your kidney function is normal, continue taking your metformin (Avandamet, Glucophage, Glucovance, Metaglip) on the day of your exam.   If your kidney function is abnormal, wait 48  hours before restarting this medicine.  Please wear loose clothing, such as a sweat suit or jogging clothes. Avoid snaps, zippers and other metal. We may ask you to undress and put on a hospital gown.  If you have any questions, please call the Imaging Department where you will have your exam.            Aug 02, 2017 10:00 AM CDT   (Arrive by 9:45 AM)   RETURN TUMOR VISIT with Gerson Ravi MD   [facility] Ear Nose [facility] Throat ([facility])    [address] [place] [address]  4th Floor  [facility] [address] [phone]              Who to contact     If you have questions or need follow up information about today's clinic visit or your schedule please contact [facility] directly at [phone].  Normal or non-critical lab and imaging results will be communicated to you by MyChart, letter or phone within 4 business days after the clinic has received the results. If you do not hear from us within 7 days, please contact the clinic through [email or web address] or phone. If you have a critical or abnormal lab result, we will notify you by phone as soon as possible.  Submit refill requests through [employer] or call your pharmacy and they will forward the refill request to us. Please allow 3 business days for your refill to be completed.          Additional Information About Your Visit        Liquipel Information     [employer] gives you secure access to your electronic health record. If you see a primary care provider, you can also send messages to your care team and make appointments. If you have questions, please call your primary care clinic.  If you do not have a primary care provider, please call [phone] and they will assist you.        Care EveryWhere ID     This is your Care EveryWhere ID. This could be used by other organizations to access your [facility] medical records  [id number]        Your Vitals Were     Pulse Temperature Pulse Oximetry BMI (Body Mass Index)          81 98  F (36.7  C)  (Tympanic) 93% 27.98 kg/m2         Blood Pressure from Last 3 Encounters:   07/13/17 138/82   06/22/17 127/76   05/23/17 103/52    Weight from Last 3 Encounters:   07/13/17 195 lb (88.5 kg)   06/22/17 198 lb (89.8 kg)   05/23/17 199 lb (90.3 kg)              We Performed the Following     CBC with platelets     Hemoglobin A1c     Lipid panel reflex to direct LDL          Today's Medication Changes          These changes are accurate as of: 7/13/17  8:55 AM.  If you have any questions, ask your nurse or doctor.               These medicines have changed or have updated prescriptions.        Dose/Directions    atenolol 100 MG tablet   Commonly known as:  TENORMIN   This may have changed:  when to take this   Used for:  Hypertension, benign essential, goal below 140/90        Dose:  100 mg   Take 1 tablet (100 mg) by mouth daily   Quantity:  90 tablet   Refills:  3       glipiZIDE 2.5 MG 24 hr tablet   Commonly known as:  glipiZIDE XL   This may have changed:  when to take this   Used for:  Type 2 diabetes mellitus with diabetic polyneuropathy, without long-term current use of insulin (H)        Dose:  2.5 mg   Take 1 tablet (2.5 mg) by mouth daily   Quantity:  90 tablet   Refills:  1       metFORMIN 750 MG 24 hr tablet   Commonly known as:  GLUCOPHAGE-XR   This may have changed:  when to take this   Used for:  Type 2 diabetes mellitus with diabetic polyneuropathy, without long-term current use of insulin (H)        Dose:  1500 mg   Take 2 tablets (1,500 mg) by mouth daily   Quantity:  180 tablet   Refills:  3       multivitamin, therapeutic with minerals Tabs tablet   This may have changed:  when to take this   Used for:  Surgery aftercare        Dose:  1 tablet   Take 1 tablet by mouth daily.   Quantity:  30 each   Refills:  1       tamsulosin 0.4 MG capsule   Commonly known as:  FLOMAX   This may have changed:  when to take this   Used for:  Urgency of urination        Dose:  0.4 mg   Take 1 capsule (0.4 mg) by  mouth daily   Quantity:  90 capsule   Refills:  3         Stop taking these medicines if you haven't already. Please contact your care team if you have questions.     aspirin 81 MG tablet   Stopped by:  Katie Gross MD           blood glucose lancets standard   Commonly known as:  no brand specified   Stopped by:  Katie Gross MD           sulfamethoxazole-trimethoprim 800-160 MG per tablet   Commonly known as:  BACTRIM DS   Stopped by:  Katie Gross MD                    Primary Care Provider Office Phone # Fax #    Katie Gross [phone]       [facility] [address] [facility] [address]        Equal Access to Services     [facility]: Hadii aad ku hadasho Soomaali, waaxda luqadaha, qaybta kaalmada adeegyada, waxay idiin hayaan adeeg danis davidson . So [facility] [phone].    ATENCIÓN: Si habla español, tiene a snyder disposición servicios gratuitos de asistencia lingüística. Llame al [phone].    We comply with applicable federal civil rights laws and [place] laws. We do not discriminate on the basis of race, color, national origin, age, disability sex, sexual orientation or gender identity.            Thank you!     Thank you for choosing [facility]  for your care. Our goal is always to provide you with excellent care. Hearing back from our patients is one way we can continue to improve our services. Please take a few minutes to complete the written survey that you may receive in the mail after your visit with us. Thank you!             Your Updated Medication List - Protect others around you: Learn how to safely use, store and throw away your medicines at www.disposemymeds.org.          This list is accurate as of: 7/13/17  8:55 AM.  Always use your most recent med list.                   Brand Name Dispense Instructions for use Diagnosis    atenolol 100 MG tablet    TENORMIN    90 tablet    Take 1 tablet (100 mg) by mouth daily     Hypertension, benign essential, goal below 140/90       blood glucose monitoring test strip    no brand specified    100 each    Use to test blood sugar two times daily    Type 2 diabetes mellitus with diabetic polyneuropathy (H)       glipiZIDE 2.5 MG 24 hr tablet    glipiZIDE XL    90 tablet    Take 1 tablet (2.5 mg) by mouth daily    Type 2 diabetes mellitus with diabetic polyneuropathy, without long-term current use of insulin (H)       metFORMIN 750 MG 24 hr tablet    GLUCOPHAGE-XR    180 tablet    Take 2 tablets (1,500 mg) by mouth daily    Type 2 diabetes mellitus with diabetic polyneuropathy, without long-term current use of insulin (H)       multivitamin, therapeutic with minerals Tabs tablet     30 each    Take 1 tablet by mouth daily.    Surgery aftercare       tamsulosin 0.4 MG capsule    FLOMAX    90 capsule    Take 1 capsule (0.4 mg) by mouth daily    Urgency of urination

## 2017-07-13 NOTE — PATIENT INSTRUCTIONS
See you back in 3-6 months    Lab Results   Component Value Date    A1C 6.4 07/13/2017    A1C 8.9 04/04/2017    A1C 8.0 01/03/2017    A1C 6.6 07/15/2016    A1C 7.8 04/04/2016

## 2017-07-13 NOTE — NURSING NOTE
"Chief Complaint   Patient presents with     Diabetes     recheck       Initial /82  Pulse 81  Temp 98  F (36.7  C) (Tympanic)  Wt 195 lb (88.5 kg)  SpO2 93%  BMI 27.98 kg/m2 Estimated body mass index is 27.98 kg/(m^2) as calculated from the following:    Height as of 6/22/17: 5' 10\" (1.778 m).    Weight as of this encounter: 195 lb (88.5 kg).  Medication Reconciliation: complete    Health Maintenance that is potentially due pending provider review:  Lipid monitoring    Possibly completing today per provider review.      "

## 2017-07-28 RX ORDER — IOPAMIDOL 755 MG/ML
125 INJECTION, SOLUTION INTRAVASCULAR ONCE
Status: COMPLETED | OUTPATIENT
Start: 2017-07-31 | End: 2017-07-31

## 2017-07-31 ENCOUNTER — HOSPITAL ENCOUNTER (OUTPATIENT)
Dept: CT IMAGING | Facility: CLINIC | Age: 72
Discharge: HOME | End: 2017-07-31
Attending: OTOLARYNGOLOGY | Admitting: OTOLARYNGOLOGY
Payer: COMMERCIAL

## 2017-07-31 ENCOUNTER — HOSPITAL ENCOUNTER (OUTPATIENT)
Dept: CT IMAGING | Facility: CLINIC | Age: 72
End: 2017-07-31
Attending: OTOLARYNGOLOGY
Payer: COMMERCIAL

## 2017-07-31 DIAGNOSIS — C04.9 MALIGNANT NEOPLASM OF FLOOR OF MOUTH (H): ICD-10-CM

## 2017-07-31 LAB
CREAT BLD-MCNC: 1.1 MG/DL (ref 0.66–1.25)
GFR SERPL CREATININE-BSD FRML MDRD: 66 ML/MIN/1.7M2

## 2017-07-31 PROCEDURE — 25000125 ZZHC RX 250: Performed by: RADIOLOGY

## 2017-07-31 PROCEDURE — 25000128 H RX IP 250 OP 636: Performed by: RADIOLOGY

## 2017-07-31 PROCEDURE — 70491 CT SOFT TISSUE NECK W/DYE: CPT

## 2017-07-31 PROCEDURE — 82565 ASSAY OF CREATININE: CPT

## 2017-07-31 PROCEDURE — 71260 CT THORAX DX C+: CPT

## 2017-07-31 RX ADMIN — SODIUM CHLORIDE 80 ML: 9 INJECTION, SOLUTION INTRAVENOUS at 08:07

## 2017-07-31 RX ADMIN — IOPAMIDOL 125 ML: 755 INJECTION, SOLUTION INTRAVENOUS at 08:07

## 2017-08-01 DIAGNOSIS — R39.15 URGENCY OF URINATION: ICD-10-CM

## 2017-08-01 RX ORDER — TAMSULOSIN HYDROCHLORIDE 0.4 MG/1
0.4 CAPSULE ORAL DAILY
Qty: 90 CAPSULE | Refills: 1 | Status: SHIPPED | OUTPATIENT
Start: 2017-08-01 | End: 2017-10-10

## 2017-08-01 NOTE — TELEPHONE ENCOUNTER
Flomax           Last Written Prescription Date: 07-12-16  Last Fill Quantity: 90, # refills: 0    Last Office Visit with G, P or [facility] prescribing provider:  01-31-17   Future Office Visit:    Next 5 appointments (look out 90 days)     Oct 12, 2017  8:00 AM CDT   Office Visit with Katie Gross MD   [facility] ([facility])    [address] [place] [id number] [phone]                  BP Readings from Last 3 Encounters:   07/13/17 138/82   06/22/17 127/76   05/23/17 103/52     Mehnaz Mcgovern  [facility] RN

## 2017-08-02 ENCOUNTER — OFFICE VISIT (OUTPATIENT)
Dept: OTOLARYNGOLOGY | Facility: CLINIC | Age: 72
End: 2017-08-02

## 2017-08-02 VITALS — HEIGHT: 70 IN | WEIGHT: 199 LBS | BODY MASS INDEX: 28.49 KG/M2

## 2017-08-02 DIAGNOSIS — C06.9 ORAL CANCER (H): Primary | ICD-10-CM

## 2017-08-02 ASSESSMENT — PAIN SCALES - GENERAL: PAINLEVEL: NO PAIN (0)

## 2017-08-02 NOTE — MR AVS SNAPSHOT
After Visit Summary   8/2/2017    Antoine Russo    MRN: [id number]           Patient Information     Date Of Birth          1945        Visit Information        Provider Department      8/2/2017 10:00 AM Gerson Ravi MD [facility] Nose [facility] Thro[facility]        Today's Diagnoses     Oral cancer (H)    -  1      Care Instructions    Follow up with Dr. Ravi as needed  Call me if ?'s arise [phone]  Ruthann Mora RN          Follow-ups after your visit        Your next 10 appointments already scheduled     Oct 12, 2017  8:00 AM CDT   [facility] Visit with Katie Gross MD   [facility]    [address] [facility] [address] [phone]           Bring a current list of meds and any records pertaining to this visit. For Physicals, please bring immunization records and any forms needing to be filled out. Please arrive 10 minutes early to complete paperwork.              Who to contact     Please call your clinic at [phone] to:    Ask questions about your health    Make or cancel appointments    Discuss your medicines    Learn about your test results    Speak to your doctor   If you have compliments or concerns about an experience at your clinic, or if you wish to file a complaint, please contact [facility] Patient Relations at [phone] or email us at [email or web address]         Additional Information About Your Visit        MyChart Information     [employer] gives you secure access to your electronic health record. If you see a primary care provider, you can also send messages to your care team and make appointments. If you have questions, please call your primary care clinic.  If you do not have a primary care provider, please call [phone] and they will assist you.      Cinelan is an electronic gateway that provides easy, online access to your medical records. With [employer], you can request a clinic  "appointment, read your test results, renew a prescription or communicate with your care team.     To access your existing account, please contact your [facility] or call [phone] for assistance.        Care EveryWhere ID     This is your Care EveryWhere ID. This could be used by other organizations to access your [facility] medical records  [id number]        Your Vitals Were     Height BMI (Body Mass Index)                1.778 m (5' 10\") 28.55 kg/m2           Blood Pressure from Last 3 Encounters:   07/13/17 138/82   06/22/17 127/76   05/23/17 103/52    Weight from Last 3 Encounters:   08/02/17 90.3 kg (199 lb)   07/13/17 88.5 kg (195 lb)   06/22/17 89.8 kg (198 lb)              We Performed the Following     LARYNGOSCOPY FLEX FIBEROPTIC, DIAGNOSTIC          Today's Medication Changes          These changes are accurate as of: 8/2/17 11:59 PM.  If you have any questions, ask your nurse or doctor.               These medicines have changed or have updated prescriptions.        Dose/Directions    atenolol 100 MG tablet   Commonly known as:  TENORMIN   This may have changed:  when to take this   Used for:  Hypertension, benign essential, goal below 140/90        Dose:  100 mg   Take 1 tablet (100 mg) by mouth daily   Quantity:  90 tablet   Refills:  3       glipiZIDE 2.5 MG 24 hr tablet   Commonly known as:  glipiZIDE XL   This may have changed:  when to take this   Used for:  Type 2 diabetes mellitus with diabetic polyneuropathy, without long-term current use of insulin (H)        Dose:  2.5 mg   Take 1 tablet (2.5 mg) by mouth daily   Quantity:  90 tablet   Refills:  1       metFORMIN 750 MG 24 hr tablet   Commonly known as:  GLUCOPHAGE-XR   This may have changed:  when to take this   Used for:  Type 2 diabetes mellitus with diabetic polyneuropathy, without long-term current use of insulin (H)        Dose:  1500 mg   Take 2 tablets (1,500 mg) by mouth daily   Quantity:  180 tablet "   Refills:  3       multivitamin, therapeutic with minerals Tabs tablet   This may have changed:  when to take this   Used for:  Surgery aftercare        Dose:  1 tablet   Take 1 tablet by mouth daily.   Quantity:  30 each   Refills:  1                Primary Care Provider Office Phone # Fax #    Katie Gross [phone]       [facility] [address] [facility] [address]        Equal Access to Services     ABBEY WALTON : Hadii aad ku hadasho Soomaali, waaxda luqadaha, qaybta kaalmada adeegyada, waxay idiin hayaan adeeg kharash lajazzn ah. So [facility] [phone].    ATENCIÓN: Si habla español, tiene a snyder disposición servicios gratuitos de asistencia lingüística. Llame al [phone].    We comply with applicable federal civil rights laws and [place] laws. We do not discriminate on the basis of race, color, national origin, age, disability sex, sexual orientation or gender identity.            Thank you!     Thank you for choosing [facility] EAR NOSE AND TH[facility]AT  for your care. Our goal is always to provide you with excellent care. Hearing back from our patients is one way we can continue to improve our services. Please take a few minutes to complete the written survey that you may receive in the mail after your visit with us. Thank you!             Your Updated Medication List - Protect others around you: Learn how to safely use, store and throw away your medicines at www.disposemymeds.org.          This list is accurate as of: 8/2/17 11:59 PM.  Always use your most recent med list.                   Brand Name Dispense Instructions for use Diagnosis    atenolol 100 MG tablet    TENORMIN    90 tablet    Take 1 tablet (100 mg) by mouth daily    Hypertension, benign essential, goal below 140/90       blood glucose monitoring test strip    no brand specified    100 each    Use to test blood sugar two times daily    Type 2 diabetes mellitus with diabetic polyneuropathy (H)       glipiZIDE  2.5 MG 24 hr tablet    glipiZIDE XL    90 tablet    Take 1 tablet (2.5 mg) by mouth daily    Type 2 diabetes mellitus with diabetic polyneuropathy, without long-term current use of insulin (H)       metFORMIN 750 MG 24 hr tablet    GLUCOPHAGE-XR    180 tablet    Take 2 tablets (1,500 mg) by mouth daily    Type 2 diabetes mellitus with diabetic polyneuropathy, without long-term current use of insulin (H)       multivitamin, therapeutic with minerals Tabs tablet     30 each    Take 1 tablet by mouth daily.    Surgery aftercare       tamsulosin 0.4 MG capsule    FLOMAX    90 capsule    Take 1 capsule (0.4 mg) by mouth daily    Urgency of urination

## 2017-08-02 NOTE — LETTER
8/2/2017        RE: Antoine Russo  [address] [address]     Dear Colleague,    Thank you for referring your patient, Antoine Russo, to the [facility] E[facility] NOSE AND THROAT at [facility]. Please see a copy of my visit note below.    August 2, 2017     PRIOR ONCOLOGIC HISTORY:  Mr. 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  He did not have postoperative radiotherapy and had a forearm free flap reconstruction by Dr. Yung Alvares.  His major difficulty occurred in 2013, when he developed C. diff colitis and had to have a colectomy.       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  He had a post-operative infection that required an extended stay in the hospital. He has since recovered since that time.     HISTORY OF PRESENT ILLNESS: He was last seen in our clinic in February 2017. He returns for follow-up with repeat CT scans today. He reports no new symptoms. He denies any pain with swallowing, no new lumps or bumps in the neck, no recent weight loss.    We chatted about the recent events within the [employer].  Mr. Russo is a retired .      PHYSICAL EXAMINATION: Well appearing, NAD.  His speech is clear. The floor of the mouth is smooth, soft, flap is well healed. The tongue has great mobility, and his speech is clear. The oral cavity and oropharynx look clear as well.  Palpation of the tongue and floor of mouth are clear.  Palpation of the base of tongue is soft but I cannot adequately feel the base of tongue.  The neck has no evidence of adenopathy.     Flexible Laryngoscopy: Due to need for cancer surveillance, flexible laryngoscopy was indicated. After  verbal consent was obtained, the right nasal passage was anesthetized with topical lidocaine and phenylephrine spray. The fiberoptic laryngoscope was passed under endoscopic vision through the right nasal passage. The turbinates were normal. The inferior and middle meati were clear bilaterally without purulence, masses, or polyps. The nasopharynx was clear. The eustachian tubes were clear. The soft palate appeared normal with good mobility. The epiglottis was sharp, and the visualized portion of the vallecula was clear. The larynx was clear with mobile cords. The arytenoids were clear, and there was no pooling in the hypopharynx.    IMAGING:   CT Neck 7/31/2017: Stable post-op changes, no signs of disease recurrence.    CT Chest 7/13/2017: Subpleural nodularity in the RLL of the lung. Stable post-op changes in the abdomen.       IMPRESSION AND PLAN: Mr. Russo is now five years out from his resection and is MARYURI. We were very happy to share this news with him today, since this is considered a CURE.  He got a little teary-eyed and expressed gratitude.     Although this is not a cancer, we informed him he will need a repeat Chest CT scan in three months to re-evaluate the RLL nodularity.      With respect to his head and neck cancer, he may now follow-up with us p.r.n. He would like to follow with Dr. Yoder closer to home. Continued surveillance recommendations are as follows:  - CT scan of the neck every 2-3 years.  - Thyroid labs yearly.  - Carotid ultrasound in three to four years to evaluate for stenosis.    This patient was examined with ENT staff, Dr. Gerson Ravi.    Little Brown MD  Otolaryngology- Head and Neck Surgery PGY3      ADDENDUM:  I have separately spoken with and examined Mr. Russo.  I was present with the patient for the entire viewing portion of the endoscopy procedure (including scope insertion and withdrawal).  I have edited Dr. Brown's note to reflect my perspective on the  findings, assessment, and plan.        Gerson Ravi MD  Otolaryngology/Head & Neck Surgery  [phone]          CC  Katie Yoder MD   [address] [place]  [address]      TH/ms

## 2017-08-02 NOTE — NURSING NOTE
Chief Complaint   Patient presents with     RECHECK     5 year f/u     Martha Sr Medical Assistant  Patient declined questioners

## 2017-08-02 NOTE — LETTER
8/2/2017      RE: Antoine Russo  [address] [address]       August 2, 2017     PRIOR ONCOLOGIC HISTORY:  Mr. 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  He did not have postoperative radiotherapy and had a forearm free flap reconstruction by Dr. Yung Alvares.  His major difficulty occurred in 2013, when he developed C. diff colitis and had to have a colectomy.       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  He had a post-operative infection that required an extended stay in the hospital. He has since recovered since that time.     HISTORY OF PRESENT ILLNESS: He was last seen in our clinic in February 2017. He returns for follow-up with repeat CT scans today. He reports no new symptoms. He denies any pain with swallowing, no new lumps or bumps in the neck, no recent weight loss.    We chatted about the recent events within the [employer].  Mr. Russo is a retired .      PHYSICAL EXAMINATION: Well appearing, NAD.  His speech is clear. The floor of the mouth is smooth, soft, flap is well healed. The tongue has great mobility, and his speech is clear. The oral cavity and oropharynx look clear as well.  Palpation of the tongue and floor of mouth are clear.  Palpation of the base of tongue is soft but I cannot adequately feel the base of tongue.  The neck has no evidence of adenopathy.     Flexible Laryngoscopy: Due to need for cancer surveillance, flexible laryngoscopy was indicated. After verbal consent was obtained, the right nasal passage was anesthetized with topical lidocaine and phenylephrine spray. The fiberoptic laryngoscope was passed under endoscopic vision through the right nasal  passage. The turbinates were normal. The inferior and middle meati were clear bilaterally without purulence, masses, or polyps. The nasopharynx was clear. The eustachian tubes were clear. The soft palate appeared normal with good mobility. The epiglottis was sharp, and the visualized portion of the vallecula was clear. The larynx was clear with mobile cords. The arytenoids were clear, and there was no pooling in the hypopharynx.    IMAGING:   CT Neck 7/31/2017: Stable post-op changes, no signs of disease recurrence.    CT Chest 7/13/2017: Subpleural nodularity in the RLL of the lung. Stable post-op changes in the abdomen.       IMPRESSION AND PLAN: Mr. Russo is now five years out from his resection and is MARYURI. We were very happy to share this news with him today, since this is considered a CURE.  He got a little teary-eyed and expressed gratitude.     Although this is not a cancer, we informed him he will need a repeat Chest CT scan in three months to re-evaluate the RLL nodularity.      With respect to his head and neck cancer, he may now follow-up with us p.r.n. He would like to follow with Dr. Yoder closer to home. Continued surveillance recommendations are as follows:  - CT scan of the neck every 2-3 years.  - Thyroid labs yearly.  - Carotid ultrasound in three to four years to evaluate for stenosis.    This patient was examined with ENT staff, Dr. Gerson Ravi.    Little Brown MD  Otolaryngology- Head and Neck Surgery PGY3      ADDENDUM:  I have separately spoken with and examined Mr. Russo.  I was present with the patient for the entire viewing portion of the endoscopy procedure (including scope insertion and withdrawal).  I have edited Dr. Brown's note to reflect my perspective on the findings, assessment, and plan.        Gerson Ravi MD  Otolaryngology/Head & Neck Surgery  [phone]      Katie Yoder MD   [address] [place]  [address]      TH/ms

## 2017-08-02 NOTE — PROGRESS NOTES
August 2, 2017     PRIOR ONCOLOGIC HISTORY:  Mr. 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  He did not have postoperative radiotherapy and had a forearm free flap reconstruction by Dr. Yung Alvares.  His major difficulty occurred in 2013, when he developed C. diff colitis and had to have a colectomy.       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  He had a post-operative infection that required an extended stay in the hospital. He has since recovered since that time.     HISTORY OF PRESENT ILLNESS: He was last seen in our clinic in February 2017. He returns for follow-up with repeat CT scans today. He reports no new symptoms. He denies any pain with swallowing, no new lumps or bumps in the neck, no recent weight loss.    We chatted about the recent events within the [place] [employer].  Mr. Russo is a retired .      PHYSICAL EXAMINATION: Well appearing, NAD.  His speech is clear. The floor of the mouth is smooth, soft, flap is well healed. The tongue has great mobility, and his speech is clear. The oral cavity and oropharynx look clear as well.  Palpation of the tongue and floor of mouth are clear.  Palpation of the base of tongue is soft but I cannot adequately feel the base of tongue.  The neck has no evidence of adenopathy.     Flexible Laryngoscopy: Due to need for cancer surveillance, flexible laryngoscopy was indicated. After verbal consent was obtained, the right nasal passage was anesthetized with topical lidocaine and phenylephrine spray. The fiberoptic laryngoscope was passed under endoscopic vision through the right nasal passage. The turbinates were normal. The inferior and middle meati were clear bilaterally  without purulence, masses, or polyps. The nasopharynx was clear. The eustachian tubes were clear. The soft palate appeared normal with good mobility. The epiglottis was sharp, and the visualized portion of the vallecula was clear. The larynx was clear with mobile cords. The arytenoids were clear, and there was no pooling in the hypopharynx.    IMAGING:   CT Neck 7/31/2017: Stable post-op changes, no signs of disease recurrence.    CT Chest 7/13/2017: Subpleural nodularity in the RLL of the lung. Stable post-op changes in the abdomen.       IMPRESSION AND PLAN: Mr. Russo is now five years out from his resection and is MARYURI. We were very happy to share this news with him today, since this is considered a CURE.  He got a little teary-eyed and expressed gratitude.     Although this is not a cancer, we informed him he will need a repeat Chest CT scan in three months to re-evaluate the RLL nodularity.      With respect to his head and neck cancer, he may now follow-up with us p.r.n. He would like to follow with Dr. Yoder closer to home. Continued surveillance recommendations are as follows:  - CT scan of the neck every 2-3 years.  - Thyroid labs yearly.  - Carotid ultrasound in three to four years to evaluate for stenosis.    This patient was examined with ENT staff, Dr. Gerson Ravi.    Little Brown MD  Otolaryngology- Head and Neck Surgery PGY3      ADDENDUM:  I have separately spoken with and examined Mr. Russo.  I was present with the patient for the entire viewing portion of the endoscopy procedure (including scope insertion and withdrawal).  I have edited Dr. Brown's note to reflect my perspective on the findings, assessment, and plan.        Gerson Ravi MD  Otolaryngology/Head & Neck Surgery  [phone]            Katie Yoder MD   [address] [place]  [address]     TH/ms

## 2017-08-21 ENCOUNTER — TELEPHONE (OUTPATIENT)
Dept: FAMILY MEDICINE | Facility: CLINIC | Age: 72
End: 2017-08-21

## 2017-08-21 DIAGNOSIS — I10 HYPERTENSION, BENIGN ESSENTIAL, GOAL BELOW 140/90: ICD-10-CM

## 2017-08-21 NOTE — TELEPHONE ENCOUNTER
Pt stopped at [facility]. Pt requesting a new optional med for Atenolol. There is a Back Order for this medication. Please Advise Alternative Medication.  Please call when medication has been switched.  Janie [facility] Ed Sec

## 2017-08-22 RX ORDER — METOPROLOL SUCCINATE 50 MG/1
50 TABLET, EXTENDED RELEASE ORAL DAILY
Qty: 30 TABLET | Refills: 1 | Status: SHIPPED | OUTPATIENT
Start: 2017-08-22 | End: 2017-10-10

## 2017-08-22 NOTE — TELEPHONE ENCOUNTER
[facility] still has the 50 mg tabs if he wants to stay on atenolol otherwise I can order metoprolol for him which I did.

## 2017-08-23 NOTE — TELEPHONE ENCOUNTER
Patient states he will take the metoprolol and try that. Advised that was sent to [facility] for him.    Yazmin Ivory RN

## 2017-10-10 ENCOUNTER — OFFICE VISIT (OUTPATIENT)
Dept: FAMILY MEDICINE | Facility: CLINIC | Age: 72
End: 2017-10-10
Payer: COMMERCIAL

## 2017-10-10 VITALS
HEART RATE: 70 BPM | TEMPERATURE: 97.8 F | BODY MASS INDEX: 29.13 KG/M2 | DIASTOLIC BLOOD PRESSURE: 84 MMHG | RESPIRATION RATE: 16 BRPM | OXYGEN SATURATION: 95 % | SYSTOLIC BLOOD PRESSURE: 139 MMHG | WEIGHT: 203 LBS

## 2017-10-10 DIAGNOSIS — N40.0 HYPERTROPHY OF PROSTATE WITHOUT URINARY OBSTRUCTION: ICD-10-CM

## 2017-10-10 DIAGNOSIS — R80.1 PERSISTENT PROTEINURIA: ICD-10-CM

## 2017-10-10 DIAGNOSIS — E11.42 TYPE 2 DIABETES MELLITUS WITH DIABETIC POLYNEUROPATHY, WITHOUT LONG-TERM CURRENT USE OF INSULIN (H): Primary | ICD-10-CM

## 2017-10-10 DIAGNOSIS — C04.0 MALIGNANT NEOPLASM OF ANTERIOR PORTION OF FLOOR OF MOUTH (H): ICD-10-CM

## 2017-10-10 DIAGNOSIS — Z13.89 SCREENING FOR DIABETIC PERIPHERAL NEUROPATHY: ICD-10-CM

## 2017-10-10 DIAGNOSIS — R39.15 URGENCY OF URINATION: ICD-10-CM

## 2017-10-10 DIAGNOSIS — L82.1 SEBORRHEIC KERATOSES: ICD-10-CM

## 2017-10-10 DIAGNOSIS — Q89.01 SPLEEN ABSENT: ICD-10-CM

## 2017-10-10 DIAGNOSIS — Z23 NEED FOR VACCINATION: ICD-10-CM

## 2017-10-10 DIAGNOSIS — I10 HYPERTENSION, BENIGN ESSENTIAL, GOAL BELOW 140/90: ICD-10-CM

## 2017-10-10 DIAGNOSIS — L98.9 SKIN LESION: ICD-10-CM

## 2017-10-10 LAB
CREAT UR-MCNC: 149 MG/DL
HBA1C MFR BLD: 7 % (ref 4.3–6)
MICROALBUMIN UR-MCNC: 187 MG/L
MICROALBUMIN/CREAT UR: 125.5 MG/G CR (ref 0–17)

## 2017-10-10 PROCEDURE — 90670 PCV13 VACCINE IM: CPT | Performed by: FAMILY MEDICINE

## 2017-10-10 PROCEDURE — 83036 HEMOGLOBIN GLYCOSYLATED A1C: CPT | Performed by: FAMILY MEDICINE

## 2017-10-10 PROCEDURE — 99214 OFFICE O/P EST MOD 30 MIN: CPT | Mod: 25 | Performed by: FAMILY MEDICINE

## 2017-10-10 PROCEDURE — 90471 IMMUNIZATION ADMIN: CPT | Performed by: FAMILY MEDICINE

## 2017-10-10 PROCEDURE — 82043 UR ALBUMIN QUANTITATIVE: CPT | Performed by: FAMILY MEDICINE

## 2017-10-10 PROCEDURE — 36415 COLL VENOUS BLD VENIPUNCTURE: CPT | Performed by: FAMILY MEDICINE

## 2017-10-10 PROCEDURE — 99207 C FOOT EXAM  NO CHARGE: CPT | Performed by: FAMILY MEDICINE

## 2017-10-10 RX ORDER — TAMSULOSIN HYDROCHLORIDE 0.4 MG/1
0.4 CAPSULE ORAL DAILY
Qty: 90 CAPSULE | Refills: 3 | Status: SHIPPED | OUTPATIENT
Start: 2017-10-10 | End: 2018-01-09

## 2017-10-10 RX ORDER — METOPROLOL SUCCINATE 50 MG/1
50 TABLET, EXTENDED RELEASE ORAL DAILY
Qty: 90 TABLET | Refills: 3 | Status: SHIPPED | OUTPATIENT
Start: 2017-10-10 | End: 2018-03-08

## 2017-10-10 RX ORDER — GABAPENTIN 300 MG/1
CAPSULE ORAL
Qty: 60 CAPSULE | Refills: 3 | Status: SHIPPED | OUTPATIENT
Start: 2017-10-10 | End: 2018-03-08

## 2017-10-10 RX ORDER — GLIPIZIDE 2.5 MG/1
2.5 TABLET, EXTENDED RELEASE ORAL EVERY MORNING
Qty: 90 TABLET | Refills: 3 | Status: SHIPPED | OUTPATIENT
Start: 2017-10-10 | End: 2018-06-21

## 2017-10-10 NOTE — PATIENT INSTRUCTIONS
See derm for that cheek lesion    Try gabapentin, one at bedtime. After 3 days, go to 2 if needed. This is for the neuropathy.    See you in 3 months

## 2017-10-10 NOTE — NURSING NOTE
"Chief Complaint   Patient presents with     Diabetes     recheck     Hypertension     recheck     Derm Problem     couple of lesions on face       Initial /84 (BP Location: Right arm)  Pulse 70  Temp 97.8  F (36.6  C) (Tympanic)  Resp 16  Wt 203 lb (92.1 kg)  SpO2 95%  BMI 29.13 kg/m2 Estimated body mass index is 29.13 kg/(m^2) as calculated from the following:    Height as of 8/2/17: 5' 10\" (1.778 m).    Weight as of this encounter: 203 lb (92.1 kg).  Medication Reconciliation: complete    Health Maintenance that is potentially due pending provider review:  NONE    n/a    Is there anyone who you would like to be able to receive your results? No  If yes have patient fill out VIDYA    "

## 2017-10-10 NOTE — PROGRESS NOTES
SUBJECTIVE:   Antoine Russo is a 71 year old male who presents to clinic today for the following health issues:      Diabetes Follow-up      Patient is checking blood sugars: not at all    Diabetic concerns: None     Symptoms of hypoglycemia (low blood sugar): none     Paresthesias (numbness or burning in feet) or sores: Yes      Date of last diabetic eye exam: 2017  Lab Results   Component Value Date    A1C 6.4 07/13/2017    A1C 8.9 04/04/2017    A1C 8.0 01/03/2017    A1C 6.6 07/15/2016    A1C 7.8 04/04/2016     The neuropathy in his feet is getting worse, is painful.     Hyperlipidemia Follow-Up      Rate your low fat/cholesterol diet?: poor    Taking statin?  No    Other lipid medications/supplements?:  none  Recent Labs   Lab Test  07/13/17   0828  07/15/16   0904   04/14/15   0853  05/15/14   0832   CHOL  170  180   < >  144  165   HDL  30*  43   < >  27*  30*   LDL  100*  110*   < >  76  75   TRIG  200*  134   < >  206*  302*   CHOLHDLRATIO   --    --    --   5.3*  5.0    < > = values in this interval not displayed.        Hypertension Follow-up      Outpatient blood pressures are not being checked.    Low Salt Diet: not monitoring salt        Amount of exercise or physical activity: None    Problems taking medications regularly: No    Medication side effects: none  Diet: regular (no restrictions)  BP Readings from Last 6 Encounters:   10/10/17 139/84   07/13/17 138/82   06/22/17 127/76   05/23/17 103/52   05/09/17 163/87   04/04/17 139/80        Derm lesions on face and behind rt ear - changes in size  lesion behind right ear x long time, can't see it  Lesion on left cheek, I treated with liquid nitrogen, was gone a long time, then has grown back.   Has another one on his right upper scalp hair line    BPH-the flomax is working very well    Oral cancer-doing well, followed by ENT    Problem list and histories reviewed & adjusted, as indicated.  Additional history: as documented    BP Readings from Last  3 Encounters:   10/10/17 139/84   07/13/17 138/82   06/22/17 127/76    Wt Readings from Last 3 Encounters:   10/10/17 203 lb (92.1 kg)   08/02/17 199 lb (90.3 kg)   07/13/17 195 lb (88.5 kg)             Reviewed and updated as needed this visit by clinical staffTobacco  Allergies       Reviewed and updated as needed this visit by Provider         ROS:  C: NEGATIVE for fever, chills, change in weight  E/M: NEGATIVE for ear, mouth and throat problems  R: NEGATIVE for significant cough or SOB  CV: NEGATIVE for chest pain, palpitations or peripheral edema  GI: NEGATIVE for nausea, abdominal pain, heartburn, or change in bowel habits  : negative for dysuria, hematuria, decreased urinary stream, erectile dysfunction    OBJECTIVE:                                                    /84 (BP Location: Right arm)  Pulse 70  Temp 97.8  F (36.6  C) (Tympanic)  Resp 16  Wt 203 lb (92.1 kg)  SpO2 95%  BMI 29.13 kg/m2  Body mass index is 29.13 kg/(m^2).  GENERAL: healthy, alert, well nourished, well hydrated, no distress  HENT: ear canals- normal; TMs- normal; Nose- normal; Mouth- no ulcers,post op changes,  no lesions  NECK: no tenderness, no adenopathy, no asymmetry, no masses, no stiffness; thyroid- normal to palpation  RESP: lungs clear to auscultation - no rales, no rhonchi, no wheezes  CV: regular rates and rhythm, normal S1 S2, no S3 or S4 and no murmur, no click or rub -  ABDOMEN: soft, no tenderness, no  hepatosplenomegaly, no masses, normal bowel sounds  MS: extremities- no gross deformities noted, no edema  Diabetic foot exam: normal DP and PT pulses, no trophic changes or ulcerative lesions, normal sensory exam and monofilament exam shows no feeling on plantar surface, can feel on dorsal feet  Skin: one cm raised round hyperpigmented lesion left cheek, 1-2 cm cyst behind right ear, I was able to squeeze keratin out if it, seborrheic keratosis on scalp near hair line       ASSESSMENT/PLAN:                                                       ASSESSMENT:  1. Type 2 diabetes mellitus with diabetic polyneuropathy, without long-term current use of insulin (H)    2. Hypertension, benign essential, goal below 140/90    3. Screening for diabetic peripheral neuropathy    4. Skin lesion    5. Seborrheic keratoses    6. Urgency of urination    7. Hypertrophy of prostate without urinary obstruction    8. 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 Spleen absent    10. Need for vaccination        PLAN:  Orders Placed This Encounter     FOOT EXAM  NO CHARGE [99195.114]     Pneumococcal vaccine 13 valent PCV13 IM (Prevnar) [26779]     ADMIN MEDICARE: Pneumococcal Vaccine ()     Albumin Random Urine Quantitative with Creat Ratio     Hemoglobin A1c     DERMATOLOGY REFERRAL     glipiZIDE (GLIPIZIDE XL) 2.5 MG 24 hr tablet     metoprolol (TOPROL-XL) 50 MG 24 hr tablet     tamsulosin (FLOMAX) 0.4 MG capsule     gabapentin (NEURONTIN) 300 MG capsule     As his neuropathy is worsening, will start gabapentin as below. Gone over benefits and risks, as well as side effects of medication.     Patient Instructions   See derm for that cheek lesion    Try gabapentin, one at bedtime. After 3 days, go to 2 if needed. This is for the neuropathy.    See you in 3 months     Katie Gross MD  [facility]

## 2017-10-10 NOTE — MR AVS SNAPSHOT
After Visit Summary   10/10/2017    Antoine Russo    MRN: [id number]           Patient Information     Date Of Birth          1945        Visit Information        Provider Department      10/10/2017 8:40 AM Katie Gross MD [facility]        Today's Diagnoses     Type 2 diabetes mellitus with diabetic polyneuropathy, without long-term current use of insulin (H)    -  1    Hypertension, benign essential, goal below 140/90        Screening for diabetic peripheral neuropathy        Skin lesion        Seborrheic keratoses        Urgency of urination        Hypertrophy of prostate without urinary obstruction        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 After 3 days, go to 2 if needed. This is for the neuropathy.    See you in 3 months          Follow-ups after your visit        Additional Services     DERMATOLOGY REFERRAL       Your provider has referred you to: [facility]: [facility] [phone]   [email or web address]    Please be aware that coverage of these services is subject to the terms and limitations of your health insurance plan.  Call member services at your health plan with any benefit or coverage questions.      Please bring the following with you to your appointment:    (1) Any X-Rays, CTs or MRIs which have been performed.  Contact the facility where they were done to arrange for  prior to your scheduled appointment.    (2) List of current medications  (3) This referral request   (4) Any documents/labs given to you for this referral                  Who to contact     If you have questions or need follow up information about today's clinic visit or your schedule please contact [facility] directly at [phone].  Normal or non-critical lab and imaging  results will be communicated to you by Commun.ithart, letter or phone within 4 business days after the clinic has received the results. If you do not hear from us within 7 days, please contact the clinic through Handpressions or phone. If you have a critical or abnormal lab result, we will notify you by phone as soon as possible.  Submit refill requests through Handpressions or call your pharmacy and they will forward the refill request to us. Please allow 3 business days for your refill to be completed.          Additional Information About Your Visit        Handpressions Information     Handpressions gives you secure access to your electronic health record. If you see a primary care provider, you can also send messages to your care team and make appointments. If you have questions, please call your primary care clinic.  If you do not have a primary care provider, please call [phone] and they will assist you.        Care EveryWhere ID     This is your Care EveryWhere ID. This could be used by other organizations to access your [place] medical records  [id number]        Your Vitals Were     Pulse Temperature Respirations Pulse Oximetry BMI (Body Mass Index)       70 97.8  F (36.6  C) (Tympanic) 16 95% 29.13 kg/m2        Blood Pressure from Last 3 Encounters:   10/10/17 139/84   07/13/17 138/82   06/22/17 127/76    Weight from Last 3 Encounters:   10/10/17 203 lb (92.1 kg)   08/02/17 199 lb (90.3 kg)   07/13/17 195 lb (88.5 kg)              We Performed the Following     Albumin Random Urine Quantitative with Creat Ratio     DERMATOLOGY REFERRAL     FOOT EXAM  NO CHARGE [74591.114]     Hemoglobin A1c          Today's Medication Changes          These changes are accurate as of: 10/10/17  9:04 AM.  If you have any questions, ask your nurse or doctor.               Start taking these medicines.        Dose/Directions    gabapentin 300 MG capsule   Commonly known as:  NEURONTIN   Used for:  Type 2 diabetes mellitus with diabetic  polyneuropathy, without long-term current use of insulin (H)   Started by:  Katie Gross MD        Take 1 tablet (300 mg) every night for 3 days, then if needed, go to two at HS   Quantity:  60 capsule   Refills:  3         These medicines have changed or have updated prescriptions.        Dose/Directions    metFORMIN 750 MG 24 hr tablet   Commonly known as:  GLUCOPHAGE-XR   This may have changed:  when to take this   Used for:  Type 2 diabetes mellitus with diabetic polyneuropathy, without long-term current use of insulin (H)        Dose:  1500 mg   Take 2 tablets (1,500 mg) by mouth daily   Quantity:  180 tablet   Refills:  3       multivitamin, therapeutic with minerals Tabs tablet   This may have changed:  when to take this   Used for:  Surgery aftercare        Dose:  1 tablet   Take 1 tablet by mouth daily.   Quantity:  30 each   Refills:  1         Stop taking these medicines if you haven't already. Please contact your care team if you have questions.     blood glucose monitoring test strip   Commonly known as:  no brand specified   Stopped by:  Katie Gross MD                Where to get your medicines      These medications were sent to [facility] [address] [place] [address] [address] [place] [address]  [address] [facility] [address]     Phone:  [phone]     gabapentin 300 MG capsule    glipiZIDE 2.5 MG 24 hr tablet    metoprolol 50 MG 24 hr tablet    tamsulosin 0.4 MG capsule                Primary Care Provider Office Phone # Fax #    Katie Gross [phone]       [address] [place] [address]        Equal Access to Services     [facility] AH: Hadii stoney matthew hadasho Somartinali, waaxda luqadaha, qaybta kaalmada adeegyada, koffi davidson . So [facility] [phone].    ATENCIÓN: Si habla español, tiene a snyder disposición servicios gratuitos de asistencia lingüística. Kia al [phone].    We comply with applicable federal civil rights laws and  [place] laws. We do not discriminate on the basis of race, color, national origin, age, disability, sex, sexual orientation, or gender identity.            Thank you!     Thank you for choosing [facility][note 2]  for your care. Our goal is always to provide you with excellent care. Hearing back from our patients is one way we can continue to improve our services. Please take a few minutes to complete the written survey that you may receive in the mail after your visit with us. Thank you!             Your Updated Medication List - Protect others around you: Learn how to safely use, store and throw away your medicines at www.disposemymeds.org.          This list is accurate as of: 10/10/17  9:04 AM.  Always use your most recent med list.                   Brand Name Dispense Instructions for use Diagnosis    gabapentin 300 MG capsule    NEURONTIN    60 capsule    Take 1 tablet (300 mg) every night for 3 days, then if needed, go to two at HS    Type 2 diabetes mellitus with diabetic polyneuropathy, without long-term current use of insulin (H)       glipiZIDE 2.5 MG 24 hr tablet    glipiZIDE XL    90 tablet    Take 1 tablet (2.5 mg) by mouth every morning    Type 2 diabetes mellitus with diabetic polyneuropathy, without long-term current use of insulin (H)       metFORMIN 750 MG 24 hr tablet    GLUCOPHAGE-XR    180 tablet    Take 2 tablets (1,500 mg) by mouth daily    Type 2 diabetes mellitus with diabetic polyneuropathy, without long-term current use of insulin (H)       metoprolol 50 MG 24 hr tablet    TOPROL-XL    90 tablet    Take 1 tablet (50 mg) by mouth daily    Hypertension, benign essential, goal below 140/90       multivitamin, therapeutic with minerals Tabs tablet     30 each    Take 1 tablet by mouth daily.    Surgery aftercare       tamsulosin 0.4 MG capsule    FLOMAX    90 capsule    Take 1 capsule (0.4 mg) by mouth daily    Urgency of urination, Hypertrophy of prostate without urinary  obstruction

## 2017-10-17 RX ORDER — LISINOPRIL 10 MG/1
10 TABLET ORAL DAILY
Qty: 90 TABLET | Refills: 1 | Status: SHIPPED | OUTPATIENT
Start: 2017-10-17 | End: 2018-06-21

## 2017-11-15 ENCOUNTER — OFFICE VISIT (OUTPATIENT)
Dept: DERMATOLOGY | Facility: CLINIC | Age: 72
End: 2017-11-15
Payer: COMMERCIAL

## 2017-11-15 VITALS — HEART RATE: 74 BPM | SYSTOLIC BLOOD PRESSURE: 160 MMHG | DIASTOLIC BLOOD PRESSURE: 76 MMHG | TEMPERATURE: 98.1 F

## 2017-11-15 DIAGNOSIS — L82.0 INFLAMED SEBORRHEIC KERATOSIS: Primary | ICD-10-CM

## 2017-11-15 DIAGNOSIS — L82.1 SEBORRHEIC KERATOSIS: ICD-10-CM

## 2017-11-15 PROCEDURE — 17110 DESTRUCTION B9 LES UP TO 14: CPT | Performed by: PHYSICIAN ASSISTANT

## 2017-11-15 PROCEDURE — 99214 OFFICE O/P EST MOD 30 MIN: CPT | Mod: 25 | Performed by: PHYSICIAN ASSISTANT

## 2017-11-15 NOTE — MR AVS SNAPSHOT
After Visit Summary   11/15/2017    Antoine Russo    MRN: [id number]           Patient Information     Date Of Birth          1945        Visit Information        Provider Department      11/15/2017 8:40 AM Emani Gallo PA-C [facility]        Care Instructions    WOUND CARE INSTRUCTIONS   FOR CRYOSURGERY   This area treated with liquid nitrogen will form a blister. You do not need to bandage the area until after the blister forms and breaks (which may be a few days). When the blister breaks, begin daily dressing changes as follows:   1) Clean and dry the area with tap water using clean Q-tip or sterile gauze pad.   2) Apply Polysporin ointment or Bacitracin ointment over entire wound. Do NOT use Neosporin ointment.   3) Cover the wound with a band-aid or sterile non-stick gauze pad and micropore paper tape.   REPEAT THESE INSTRUCTIONS AT LEAST ONCE A DAY UNTIL THE WOUND HAS COMPLETELY HEALED.   It is an old wives tale that a wound heals better when it is exposed to air and allowed to dry out. The wound will heal faster with a better cosmetic result if it is kept moist with ointment and covered with a bandage.   Do not let the wound dry out.   IMPORTANT INFORMATION ON REVERSE SIDE   Supplies Needed:   *Cotton tipped applicators (Q-tips)   *Polysporin ointment or Bacitracin ointment (NOT NEOSPORIN)   *Band-aids, or non stick gauze pads and micropore paper tape   PATIENT INFORMATION   During the healing process you will notice a number of changes. All wounds develop a small halo of redness surrounding the wound. This means healing is occurring. Severe itching with extensive redness usually indicates sensitivity to the ointment or bandage tape used to dress the wound. You should call our office if this develops.   Swelling and/or discoloration around your surgical site is common, particularly when performed around the eye.   All wounds normally drain. The larger the  wound the more drainage there will be. After 7-10 days, you will notice the wound beginning to shrink and new skin will begin to grow. The wound is healed when you can see skin has formed over the entire area. A healed wound has a healthy, shiny look to the surface and is red to dark pink in color to normalize. Wounds may take approximately 4-6 weeks to heal. Larger wounds may take 6-8 weeks. After the wound is healed you may discontinue dressing changes.   You may experience a sensation of tightness as your wound heals. This is normal and will gradually subside.   Your healed wound may be sensitive to temperature changes. This sensitivity improves with time, but if you re having a lot of discomfort, try to avoid temperature extremes.   Patients frequently experience itching after their wound appears to have healed because of the continue healing under the skin. Plain Vaseline will help relieve the itching.                 Follow-ups after your visit        Your next 10 appointments already scheduled     Jose 15, 2018  8:00 AM CST   SHORT with Katie Gross MD   [facility] ([facility])    [address] [place] [id number] [phone]              Who to contact     If you have questions or need follow up information about today's clinic visit or your schedule please contact [facility] directly at [phone].  Normal or non-critical lab and imaging results will be communicated to you by MyChart, letter or phone within 4 business days after the clinic has received the results. If you do not hear from us within 7 days, please contact the clinic through MyChart or phone. If you have a critical or abnormal lab result, we will notify you by phone as soon as possible.  Submit refill requests through InteliVideo or call your pharmacy and they will forward the refill request to us. Please allow 3 business days for your refill to be completed.          Additional  Information About Your Visit        Snowshoefoodhart Information     Tansler gives you secure access to your electronic health record. If you see a primary care provider, you can also send messages to your care team and make appointments. If you have questions, please call your primary care clinic.  If you do not have a primary care provider, please call [phone] and they will assist you.        Care EveryWhere ID     This is your Care EveryWhere ID. This could be used by other organizations to access your [place] medical records  [id number]        Your Vitals Were     Pulse Temperature                77 98.1  F (36.7  C) (Tympanic)           Blood Pressure from Last 3 Encounters:   11/15/17 182/88   10/10/17 139/84   07/13/17 138/82    Weight from Last 3 Encounters:   10/10/17 92.1 kg (203 lb)   08/02/17 90.3 kg (199 lb)   07/13/17 88.5 kg (195 lb)              Today, you had the following     No orders found for display         Today's Medication Changes          These changes are accurate as of: 11/15/17  8:43 AM.  If you have any questions, ask your nurse or doctor.               These medicines have changed or have updated prescriptions.        Dose/Directions    metFORMIN 750 MG 24 hr tablet   Commonly known as:  GLUCOPHAGE-XR   This may have changed:  when to take this   Used for:  Type 2 diabetes mellitus with diabetic polyneuropathy, without long-term current use of insulin (H)        Dose:  1500 mg   Take 2 tablets (1,500 mg) by mouth daily   Quantity:  180 tablet   Refills:  3       multivitamin, therapeutic with minerals Tabs tablet   This may have changed:  when to take this   Used for:  Surgery aftercare        Dose:  1 tablet   Take 1 tablet by mouth daily.   Quantity:  30 each   Refills:  1                Primary Care Provider Office Phone # Fax #    Katie Gross [phone] [address] [place] [address]        Equal Access to Services     [facility] AH: Raquel matthew  katy Bernstein, wazoraidada lujean-claudeadaha, qaybta kaalmada byron, koffi vickiein hayaaamcho keeclaudia jorjeasa laGeraldjacinto sumeet. So [facility] [phone].    ATENCIÓN: Si constancela kyle, tiene a snyder disposición servicios gratuitos de asistencia lingüística. Kia al [phone].    We comply with applicable federal civil rights laws and [place] laws. We do not discriminate on the basis of race, color, national origin, age, disability, sex, sexual orientation, or gender identity.            Thank you!     Thank you for choosing [facility]  for your care. Our goal is always to provide you with excellent care. Hearing back from our patients is one way we can continue to improve our services. Please take a few minutes to complete the written survey that you may receive in the mail after your visit with us. Thank you!             Your Updated Medication List - Protect others around you: Learn how to safely use, store and throw away your medicines at www.disposemymeds.org.          This list is accurate as of: 11/15/17  8:43 AM.  Always use your most recent med list.                   Brand Name Dispense Instructions for use Diagnosis    gabapentin 300 MG capsule    NEURONTIN    60 capsule    Take 1 tablet (300 mg) every night for 3 days, then if needed, go to two at     Type 2 diabetes mellitus with diabetic polyneuropathy, without long-term current use of insulin (H)       glipiZIDE 2.5 MG 24 hr tablet    glipiZIDE XL    90 tablet    Take 1 tablet (2.5 mg) by mouth every morning    Type 2 diabetes mellitus with diabetic polyneuropathy, without long-term current use of insulin (H)       lisinopril 10 MG tablet    PRINIVIL/ZESTRIL    90 tablet    Take 1 tablet (10 mg) by mouth daily    Persistent proteinuria       metFORMIN 750 MG 24 hr tablet    GLUCOPHAGE-XR    180 tablet    Take 2 tablets (1,500 mg) by mouth daily    Type 2 diabetes mellitus with diabetic polyneuropathy, without long-term current use of insulin (H)        metoprolol 50 MG 24 hr tablet    TOPROL-XL    90 tablet    Take 1 tablet (50 mg) by mouth daily    Hypertension, benign essential, goal below 140/90       multivitamin, therapeutic with minerals Tabs tablet     30 each    Take 1 tablet by mouth daily.    Surgery aftercare       tamsulosin 0.4 MG capsule    FLOMAX    90 capsule    Take 1 capsule (0.4 mg) by mouth daily    Urgency of urination, Hypertrophy of prostate without urinary obstruction

## 2017-11-15 NOTE — LETTER
11/15/2017         RE: Antoine Russo  [address] [id number]        Dear Colleague,    Thank you for referring your patient, Antoine Russo, to the [facility]. Please see a copy of my visit note below.    Antoine Russo is a 71 year old year old male patient here today for consult of spot on left cheek by Dr Gross.  Patient states this has been present for many years.  Patient reports that spot has been growing. He reports that he finds himself picking at spot.  Patient has no other skin complaints today.  Remainder of the HPI, Meds, PMH, Allergies, FH, and SH was reviewed in chart.    Pertinent Hx:   No persona history of skin cancer.   Past Medical History:   Diagnosis Date     C. 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     Noninfectious ileitis        Past Surgical History:   Procedure Laterality Date     BREAST SURGERY  2008    right breast mass benign     COLONOSCOPY      multiple polyps removed     COLONOSCOPY  8/24/2011    Procedure:COMBINED COLONOSCOPY, REMOVE TUMOR/POLYP/LESION BY SNARE; Surgeon:MILEY ARBOLEDA; Location:[facility]     COLONOSCOPY  12/17/2012    Procedure: COLONOSCOPY;;  Surgeon: Leon Maurer MD;  Location:  GI     COLONOSCOPY  12/18/2012    Procedure: COLONOSCOPY;;  Surgeon: Leon Maurer MD;  Location:  GI     DENERVATION OF SPERMATIC CORD MICROSURGICAL Left 5/23/2017    Procedure: DENERVATION OF SPERMATIC CORD MICROSURGICAL;;  Surgeon: Marcio Aggarwal MD;  [facility]     DISSECTION RADICAL NECK BILATERAL  8/2/2012    Procedure: DISSECTION RADICAL NECK BILATERAL;;  Surgeon: Yung Alvares MD;  [facility]     ENDOSCOPIC RETROGRADE CHOLANGIOPANCREATOGRAM N/A 5/10/2016    Procedure: COMBINED ENDOSCOPIC RETROGRADE CHOLANGIOPANCREATOGRAPHY, PLACE  TUBE/STENT;  Surgeon: Yovanny Beasley MD;  [facility]     ENDOSCOPIC ULTRASOUND UPPER GASTROINTESTINAL TRACT (GI) N/A 2/3/2016    Procedure: ENDOSCOPIC ULTRASOUND, ESOPHAGOSCOPY / UPPER GASTROINTESTINAL TRACT (GI);  Surgeon: Grabiel Plata MD;  [facility]     ESOPHAGOSCOPY, GASTROSCOPY, DUODENOSCOPY (EGD), COMBINED N/A 2/3/2016    Procedure: COMBINED ENDOSCOPIC ULTRASOUND, ESOPHAGOSCOPY, GASTROSCOPY, DUODENOSCOPY (EGD), FINE NEEDLE ASPIRATE/BIOPSY;  Surgeon: Grabiel Plata MD;  [facility]     ESOPHAGOSCOPY, GASTROSCOPY, DUODENOSCOPY (EGD), COMBINED N/A 6/8/2016    Procedure: COMBINED ESOPHAGOSCOPY, GASTROSCOPY, DUODENOSCOPY (EGD), REMOVE FOREIGN BODY;  Surgeon: Yovanny Beasley MD;  [facility]     EXCISE LESION INTRAORAL  6/14/2012    Procedure: EXCISE LESION INTRAORAL;  Wide Local Excision Floor of Mouth, Direct Laryngoscopy, Bilateral [place]'s Marsuplization, Split Thickness Skin Graft from right Thigh  Latex Safe;  Surgeon: Gerson Ravi MD;  [facility]     EXCISE LESION INTRAORAL  8/2/2012    Procedure: EXCISE LESION INTRAORAL;  Floor of Mouth Resection, Bilateral Selective Radical Neck Dissection, Tracheostomy, Left Radial Forearm  Free Flap with Alloderm, Nasogastric Feeding Tube Placement,    * Latex Safe*;  Surgeon: Gerson Ravi MD;  [facility]     EXCISE LESION INTRAORAL  12/11/2012    Procedure: EXCISE LESION INTRAORAL;  takedown of oral flap;  Surgeon: Yung Alvares MD;  [facility]     GRAFT FREE VASCULARIZED (LOCATION)  8/2/2012    Procedure: GRAFT FREE VASCULARIZED (LOCATION);;  Surgeon: Yung Alvares MD;  [facility]     GRAFT SKIN SPLIT THICKNESS FROM EXTREMITY  6/14/2012    Procedure: GRAFT SKIN SPLIT THICKNESS FROM EXTREMITY;;  Surgeon: Gerson Ravi MD;  [facility]     LAPAROSCOPIC ILEOSTOMY TAKEDOWN  6/6/2013    Procedure: LAPAROSCOPIC ILEOSTOMY TAKEDOWN;  Laparoscopic Closure of Enterostomy, Guerda's Type with  IleoRectal Anastomosis ;  Surgeon: Grabiel Riddle MD;  [facility]     LAPAROTOMY EXPLORATORY  2012    Procedure: LAPAROTOMY EXPLORATORY;  Exploratory Laparotomy, total abdominal colectomy, ileostomy formation;  Surgeon: Miquel Cannon MD;  [facility]     LARYNGOSCOPY  2012    Procedure: LARYNGOSCOPY;;  Surgeon: Gerson Ravi MD;  [facility]     ORTHOPEDIC SURGERY      ganglian cyst left ankle     PANCREATECTOMY, SPLENECTOMY N/A 3/10/2016    Procedure: PANCREATECTOMY, SPLENECTOMY;  Surgeon: Nael Abel MD;  [facility]     SHOULDER SURGERY  ,     2006- right rotator cuff,  bone spur on left. Dr. Hdez     VARICOCELECTOMY Left 2017    Procedure: VARICOCELECTOMY;  Left Varicocele Repair, Denervation of Left Testis;  Surgeon: Marcio Aggarwal MD;  [facility]        Family History   Problem Relation Age of Onset     DIABETES Sister      onset age 50     Alzheimer Disease Mother       80     Alzheimer Disease Father       85     DIABETES Other      nephew type 1     DIABETES Other      Anesthesia Reaction No family hx of      Colon Cancer No family hx of      Colon Polyps No family hx of      Crohn Disease No family hx of      Ulcerative Colitis No family hx of        Social History     Social History     Marital status:      Spouse name: N/A     Number of children: N/A     Years of education: N/A     Occupational History     [employer]      20 hr/week monday-Thur 7-noon     [place]  [employer]     RETIRED      Social History Main Topics     Smoking status: Former Smoker     Packs/day: 1.00     Years: 40.00     Types: Cigarettes     Quit date: 2006     Smokeless tobacco: Never Used     Alcohol use Yes      Comment: 6-12 beers/daily for at least 40 years; now 3-4 beers daily now     Drug use: No     Sexual activity: Yes     Partners: Female     Other Topics Concern      Service Yes      [employer] 6 years     Blood Transfusions No     Caffeine Concern Yes     0-2 cups     Occupational Exposure No     retired     Hobby Hazards No     Sleep Concern No     Stress Concern No     Weight Concern No     Special Diet Yes     healthy     Back Care No     Exercise Yes     Bike Helmet Not Asked     N/A     Seat Belt Yes     Self-Exams Yes     Parent/Sibling W/ Cabg, Mi Or Angioplasty Before 65f 55m? No     Social History Narrative    Son lives in [place] with 2 grandchildren 19 and 6       Outpatient Encounter Prescriptions as of 11/15/2017   Medication Sig Dispense Refill     lisinopril (PRINIVIL/ZESTRIL) 10 MG tablet Take 1 tablet (10 mg) by mouth daily 90 tablet 1     glipiZIDE (GLIPIZIDE XL) 2.5 MG 24 hr tablet Take 1 tablet (2.5 mg) by mouth every morning 90 tablet 3     metoprolol (TOPROL-XL) 50 MG 24 hr tablet Take 1 tablet (50 mg) by mouth daily 90 tablet 3     tamsulosin (FLOMAX) 0.4 MG capsule Take 1 capsule (0.4 mg) by mouth daily 90 capsule 3     gabapentin (NEURONTIN) 300 MG capsule Take 1 tablet (300 mg) every night for 3 days, then if needed, go to two at HS 60 capsule 3     metFORMIN (GLUCOPHAGE-XR) 750 MG 24 hr tablet Take 2 tablets (1,500 mg) by mouth daily (Patient taking differently: Take 1,500 mg by mouth every morning ) 180 tablet 3     multivitamin, therapeutic with minerals (THERA-VIT-M) TABS Take 1 tablet by mouth daily. (Patient taking differently: Take 1 tablet by mouth every morning ) 30 each 1     No facility-administered encounter medications on file as of 11/15/2017.              Review Of Systems  Skin: As above  Eyes: negative  Ears/Nose/Throat: negative  Respiratory: No shortness of breath, dyspnea on exertion, cough, or hemoptysis  Cardiovascular: negative  Gastrointestinal: negative  Genitourinary: negative  Musculoskeletal: negative  Neurologic: negative  Psychiatric: negative  Hematologic/Lymphatic/Immunologic: negative  Endocrine: negative      O:   NAD, WDWN, Alert &  Oriented, Mood & Affect wnl, Vitals stable   Here today alone   /76  Pulse 74  Temp 98.1  F (36.7  C) (Tympanic)   General appearance normal   Vitals stable   Alert, oriented and in no acute distress      Light brown stuck papule on left cheek   Brown stuck on papules on face and arms     Eyes: Conjunctivae/lids:Normal     ENT: Lips    MSK:Normal    Pulm: Breathing Normal    Neuro/Psych: Orientation:Normal; Mood/Affect:Normal    A/P:  1. Inflamed seborrheic keratosis x 1 on left cheek  LN2:  Treated with LN2 for 5s for 1-2 cycles. Warned risks of blistering, pain, pigment change, scarring, and incomplete resolution.  Advised patient to return if lesions do not completely resolve.  Wound care sheet given.  Discussed can take more than one treatment for resolution.   2. Seborrheic keratosis   BENIGN LESIONS DISCUSSED WITH PATIENT:  I discussed the specifics of tumor, prognosis, and genetics of benign lesions.  I explained that treatment of these lesions would be purely cosmetic and not medically neccessary.  I discussed with patient different removal options including excision, cautery and /or laser.      Nature and genetics of benign skin lesions dicussed with patient.  Signs and Symptoms of skin cancer discussed with patient.  ABCDEs of melanoma reviewed with patient.  Patient encouraged to perform monthly skin exams.  UV precautions reviewed with patient.  Risks of non-melanoma skin cancer discussed with patient   Return to clinic as needed.       Again, thank you for allowing me to participate in the care of your patient.        Sincerely,        Emani Falk PA-C

## 2017-11-15 NOTE — NURSING NOTE
"Chief Complaint   Patient presents with     Derm Problem     spot on face       Initial /88  Pulse 77  Temp 98.1  F (36.7  C) (Tympanic) Estimated body mass index is 29.13 kg/(m^2) as calculated from the following:    Height as of 8/2/17: 1.778 m (5' 10\").    Weight as of 10/10/17: 92.1 kg (203 lb).  BP completed using cuff size: faraz Burkett LPN    "

## 2017-11-20 NOTE — PROGRESS NOTES
Antoine Russo is a 71 year old year old male patient here today for consult of spot on left cheek by Dr Gross.  Patient states this has been present for many years.  Patient reports that spot has been growing. He reports that he finds himself picking at spot.  Patient has no other skin complaints today.  Remainder of the HPI, Meds, PMH, Allergies, FH, and SH was reviewed in chart.    Pertinent Hx:   No persona history of skin cancer.   Past Medical History:   Diagnosis Date     C. 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     Noninfectious ileitis        Past Surgical History:   Procedure Laterality Date     BREAST SURGERY  2008    right breast mass benign     COLONOSCOPY      multiple polyps removed     COLONOSCOPY  8/24/2011    Procedure:COMBINED COLONOSCOPY, REMOVE TUMOR/POLYP/LESION BY SNARE; Surgeon:MILEY ARBOLEDA; Location[facility]     COLONOSCOPY  12/17/2012    Procedure: COLONOSCOPY;;  Surgeon: Leon Maurer MD;  [facility]     COLONOSCOPY  12/18/2012    Procedure: COLONOSCOPY;;  Surgeon: Leon Maurer MD;  [facility]     DENERVATION OF SPERMATIC CORD MICROSURGICAL Left 5/23/2017    Procedure: DENERVATION OF SPERMATIC CORD MICROSURGICAL;;  Surgeon: Marcio Aggarwal MD;  [facility]     DISSECTION RADICAL NECK BILATERAL  8/2/2012    Procedure: DISSECTION RADICAL NECK BILATERAL;;  Surgeon: Yung Alvares MD;  [facility]     ENDOSCOPIC RETROGRADE CHOLANGIOPANCREATOGRAM N/A 5/10/2016    Procedure: COMBINED ENDOSCOPIC RETROGRADE CHOLANGIOPANCREATOGRAPHY, PLACE TUBE/STENT;  Surgeon: Yovanny Beasley MD;  [facility]     ENDOSCOPIC ULTRASOUND UPPER GASTROINTESTINAL TRACT (GI) N/A 2/3/2016    Procedure: ENDOSCOPIC ULTRASOUND, ESOPHAGOSCOPY / UPPER GASTROINTESTINAL TRACT (GI);  Surgeon: Sherlyn  Grabiel Doe MD;  [facility]     ESOPHAGOSCOPY, GASTROSCOPY, DUODENOSCOPY (EGD), COMBINED N/A 2/3/2016    Procedure: COMBINED ENDOSCOPIC ULTRASOUND, ESOPHAGOSCOPY, GASTROSCOPY, DUODENOSCOPY (EGD), FINE NEEDLE ASPIRATE/BIOPSY;  Surgeon: Grabiel Plata MD;  [facility]     ESOPHAGOSCOPY, GASTROSCOPY, DUODENOSCOPY (EGD), COMBINED N/A 6/8/2016    Procedure: COMBINED ESOPHAGOSCOPY, GASTROSCOPY, DUODENOSCOPY (EGD), REMOVE FOREIGN BODY;  Surgeon: Yovanny Beasley MD;  [facility]     EXCISE LESION INTRAORAL  6/14/2012    Procedure: EXCISE LESION INTRAORAL;  Wide Local Excision Floor of Mouth, Direct Laryngoscopy, Bilateral [place]'s Marsuplization, Split Thickness Skin Graft from right Thigh  Latex Safe;  Surgeon: Gerson Ravi MD;  [facility]     EXCISE LESION INTRAORAL  8/2/2012    Procedure: EXCISE LESION INTRAORAL;  Floor of Mouth Resection, Bilateral Selective Radical Neck Dissection, Tracheostomy, Left Radial Forearm  Free Flap with Alloderm, Nasogastric Feeding Tube Placement,    * Latex Safe*;  Surgeon: Gerson Ravi MD;  [facility]     EXCISE LESION INTRAORAL  12/11/2012    Procedure: EXCISE LESION INTRAORAL;  takedown of oral flap;  Surgeon: Yung Alvares MD;  [facility]     GRAFT FREE VASCULARIZED (LOCATION)  8/2/2012    Procedure: GRAFT FREE VASCULARIZED (LOCATION);;  Surgeon: Yung Alvares MD;  [facility]     GRAFT SKIN SPLIT THICKNESS FROM EXTREMITY  6/14/2012    Procedure: GRAFT SKIN SPLIT THICKNESS FROM EXTREMITY;;  Surgeon: Gerson Ravi MD;  [facility]     LAPAROSCOPIC ILEOSTOMY TAKEDOWN  6/6/2013    Procedure: LAPAROSCOPIC ILEOSTOMY TAKEDOWN;  Laparoscopic Closure of Enterostomy, Guerda's Type with IleoRectal Anastomosis ;  Surgeon: Grabiel Riddle MD;  [facility]     LAPAROTOMY EXPLORATORY  12/20/2012    Procedure: LAPAROTOMY EXPLORATORY;  Exploratory Laparotomy, total abdominal colectomy, ileostomy formation;  Surgeon:  Miquel Cannon MD;  [facility]     LARYNGOSCOPY  2012    Procedure: LARYNGOSCOPY;;  Surgeon: Gerson Ravi MD;  [facility]     ORTHOPEDIC SURGERY      ganglian cyst left ankle     PANCREATECTOMY, SPLENECTOMY N/A 3/10/2016    Procedure: PANCREATECTOMY, SPLENECTOMY;  Surgeon: Nael Abel MD;  [facility]     SHOULDER SURGERY  ,     2006- right rotator cuff,  bone spur on left. Dr. Hdez     VARICOCELECTOMY Left 2017    Procedure: VARICOCELECTOMY;  Left Varicocele Repair, Denervation of Left Testis;  Surgeon: Marcio Aggarwal MD;  [facility]        Family History   Problem Relation Age of Onset     DIABETES Sister      onset age 50     Alzheimer Disease Mother       80     Alzheimer Disease Father       85     DIABETES Other      nephew type 1     DIABETES Other      Anesthesia Reaction No family hx of      Colon Cancer No family hx of      Colon Polyps No family hx of      Crohn Disease No family hx of      Ulcerative Colitis No family hx of        Social History     Social History     Marital status:      Spouse name: N/A     Number of children: N/A     Years of education: N/A     Occupational History     [employer]      20 hr/week monday-Thur 7-noon     [place]  [employer]     RETIRED      Social History Main Topics     Smoking status: Former Smoker     Packs/day: 1.00     Years: 40.00     Types: Cigarettes     Quit date: 2006     Smokeless tobacco: Never Used     Alcohol use Yes      Comment: 6-12 beers/daily for at least 40 years; now 3-4 beers daily now     Drug use: No     Sexual activity: Yes     Partners: Female     Other Topics Concern      Service Yes     [employer] 6 years     Blood Transfusions No     Caffeine Concern Yes     0-2 cups     Occupational Exposure No     retired     Hobby Hazards No     Sleep Concern No     Stress Concern No     Weight Concern No     Special Diet Yes     healthy      Back Care No     Exercise Yes     Bike Helmet Not Asked     N/A     Seat Belt Yes     Self-Exams Yes     Parent/Sibling W/ Cabg, Mi Or Angioplasty Before 65f 55m? No     Social History Narrative    Son lives in [place] with 2 grandchildren 19 and 6       Outpatient Encounter Prescriptions as of 11/15/2017   Medication Sig Dispense Refill     lisinopril (PRINIVIL/ZESTRIL) 10 MG tablet Take 1 tablet (10 mg) by mouth daily 90 tablet 1     glipiZIDE (GLIPIZIDE XL) 2.5 MG 24 hr tablet Take 1 tablet (2.5 mg) by mouth every morning 90 tablet 3     metoprolol (TOPROL-XL) 50 MG 24 hr tablet Take 1 tablet (50 mg) by mouth daily 90 tablet 3     tamsulosin (FLOMAX) 0.4 MG capsule Take 1 capsule (0.4 mg) by mouth daily 90 capsule 3     gabapentin (NEURONTIN) 300 MG capsule Take 1 tablet (300 mg) every night for 3 days, then if needed, go to two at HS 60 capsule 3     metFORMIN (GLUCOPHAGE-XR) 750 MG 24 hr tablet Take 2 tablets (1,500 mg) by mouth daily (Patient taking differently: Take 1,500 mg by mouth every morning ) 180 tablet 3     multivitamin, therapeutic with minerals (THERA-VIT-M) TABS Take 1 tablet by mouth daily. (Patient taking differently: Take 1 tablet by mouth every morning ) 30 each 1     No facility-administered encounter medications on file as of 11/15/2017.              Review Of Systems  Skin: As above  Eyes: negative  Ears/Nose/Throat: negative  Respiratory: No shortness of breath, dyspnea on exertion, cough, or hemoptysis  Cardiovascular: negative  Gastrointestinal: negative  Genitourinary: negative  Musculoskeletal: negative  Neurologic: negative  Psychiatric: negative  Hematologic/Lymphatic/Immunologic: negative  Endocrine: negative      O:   NAD, WDWN, Alert & Oriented, Mood & Affect wnl, Vitals stable   Here today alone   /76  Pulse 74  Temp 98.1  F (36.7  C) (Tympanic)   General appearance normal   Vitals stable   Alert, oriented and in no acute distress      Light brown stuck papule on  left cheek   Brown stuck on papules on face and arms     Eyes: Conjunctivae/lids:Normal     ENT: Lips    MSK:Normal    Pulm: Breathing Normal    Neuro/Psych: Orientation:Normal; Mood/Affect:Normal    A/P:  1. Inflamed seborrheic keratosis x 1 on left cheek  LN2:  Treated with LN2 for 5s for 1-2 cycles. Warned risks of blistering, pain, pigment change, scarring, and incomplete resolution.  Advised patient to return if lesions do not completely resolve.  Wound care sheet given.  Discussed can take more than one treatment for resolution.   2. Seborrheic keratosis   BENIGN LESIONS DISCUSSED WITH PATIENT:  I discussed the specifics of tumor, prognosis, and genetics of benign lesions.  I explained that treatment of these lesions would be purely cosmetic and not medically neccessary.  I discussed with patient different removal options including excision, cautery and /or laser.      Nature and genetics of benign skin lesions dicussed with patient.  Signs and Symptoms of skin cancer discussed with patient.  ABCDEs of melanoma reviewed with patient.  Patient encouraged to perform monthly skin exams.  UV precautions reviewed with patient.  Risks of non-melanoma skin cancer discussed with patient   Return to clinic as needed.

## 2018-01-01 ENCOUNTER — HOSPITAL ENCOUNTER (EMERGENCY)
Facility: CLINIC | Age: 73
Discharge: ANOTHER HOSPITAL | End: 2018-01-02
Attending: FAMILY MEDICINE | Admitting: FAMILY MEDICINE
Payer: COMMERCIAL

## 2018-01-01 DIAGNOSIS — K85.90 ACUTE PANCREATITIS, UNSPECIFIED COMPLICATION STATUS, UNSPECIFIED PANCREATITIS TYPE: ICD-10-CM

## 2018-01-01 PROCEDURE — 99285 EMERGENCY DEPT VISIT HI MDM: CPT | Mod: Z6 | Performed by: FAMILY MEDICINE

## 2018-01-01 PROCEDURE — 80053 COMPREHEN METABOLIC PANEL: CPT | Performed by: FAMILY MEDICINE

## 2018-01-01 PROCEDURE — 83690 ASSAY OF LIPASE: CPT | Performed by: FAMILY MEDICINE

## 2018-01-01 PROCEDURE — 99285 EMERGENCY DEPT VISIT HI MDM: CPT | Mod: 25 | Performed by: FAMILY MEDICINE

## 2018-01-01 PROCEDURE — 25000128 H RX IP 250 OP 636: Performed by: FAMILY MEDICINE

## 2018-01-01 PROCEDURE — 85025 COMPLETE CBC W/AUTO DIFF WBC: CPT | Performed by: FAMILY MEDICINE

## 2018-01-01 PROCEDURE — 96361 HYDRATE IV INFUSION ADD-ON: CPT | Performed by: FAMILY MEDICINE

## 2018-01-01 PROCEDURE — 84484 ASSAY OF TROPONIN QUANT: CPT | Performed by: FAMILY MEDICINE

## 2018-01-01 RX ADMIN — SODIUM CHLORIDE 1000 ML: 9 INJECTION, SOLUTION INTRAVENOUS at 23:59

## 2018-01-02 ENCOUNTER — RECORDS - HEALTHEAST (OUTPATIENT)
Dept: ADMINISTRATIVE | Facility: OTHER | Age: 73
End: 2018-01-02

## 2018-01-02 ENCOUNTER — APPOINTMENT (OUTPATIENT)
Dept: CT IMAGING | Facility: CLINIC | Age: 73
End: 2018-01-02
Attending: FAMILY MEDICINE
Payer: COMMERCIAL

## 2018-01-02 ENCOUNTER — TRANSFERRED RECORDS (OUTPATIENT)
Dept: HEALTH INFORMATION MANAGEMENT | Facility: CLINIC | Age: 73
End: 2018-01-02

## 2018-01-02 VITALS
BODY MASS INDEX: 28.63 KG/M2 | SYSTOLIC BLOOD PRESSURE: 177 MMHG | DIASTOLIC BLOOD PRESSURE: 90 MMHG | TEMPERATURE: 97.5 F | RESPIRATION RATE: 16 BRPM | OXYGEN SATURATION: 93 % | HEIGHT: 70 IN | WEIGHT: 200 LBS

## 2018-01-02 LAB
ALBUMIN SERPL-MCNC: 3.5 G/DL (ref 3.4–5)
ALBUMIN UR-MCNC: 10 MG/DL
ALP SERPL-CCNC: 58 U/L (ref 40–150)
ALT SERPL W P-5'-P-CCNC: 39 U/L (ref 0–70)
AMYLASE SERPL-CCNC: 77 U/L (ref 30–110)
ANION GAP SERPL CALCULATED.3IONS-SCNC: 9 MMOL/L (ref 3–14)
APPEARANCE UR: CLEAR
AST SERPL W P-5'-P-CCNC: 19 U/L (ref 0–45)
BACTERIA #/AREA URNS HPF: ABNORMAL /HPF
BASOPHILS # BLD AUTO: 0 10E9/L (ref 0–0.2)
BASOPHILS NFR BLD AUTO: 0.2 %
BILIRUB SERPL-MCNC: 0.9 MG/DL (ref 0.2–1.3)
BILIRUB UR QL STRIP: NEGATIVE
BUN SERPL-MCNC: 10 MG/DL (ref 7–30)
CALCIUM SERPL-MCNC: 9.3 MG/DL (ref 8.5–10.1)
CHLORIDE SERPL-SCNC: 95 MMOL/L (ref 94–109)
CO2 SERPL-SCNC: 25 MMOL/L (ref 20–32)
COLOR UR AUTO: ABNORMAL
CREAT SERPL-MCNC: 0.83 MG/DL (ref 0.66–1.25)
DIFFERENTIAL METHOD BLD: ABNORMAL
EOSINOPHIL # BLD AUTO: 0 10E9/L (ref 0–0.7)
EOSINOPHIL NFR BLD AUTO: 0.1 %
ERYTHROCYTE [DISTWIDTH] IN BLOOD BY AUTOMATED COUNT: 14.4 % (ref 10–15)
GFR SERPL CREATININE-BSD FRML MDRD: >90 ML/MIN/1.7M2
GLUCOSE SERPL-MCNC: 282 MG/DL (ref 70–99)
GLUCOSE UR STRIP-MCNC: >1000 MG/DL
HCT VFR BLD AUTO: 45.4 % (ref 40–53)
HGB BLD-MCNC: 15.3 G/DL (ref 13.3–17.7)
HGB UR QL STRIP: NEGATIVE
IMM GRANULOCYTES # BLD: 0.1 10E9/L (ref 0–0.4)
IMM GRANULOCYTES NFR BLD: 0.3 %
KETONES UR STRIP-MCNC: NEGATIVE MG/DL
LEUKOCYTE ESTERASE UR QL STRIP: ABNORMAL
LIPASE SERPL-CCNC: 1867 U/L (ref 73–393)
LYMPHOCYTES # BLD AUTO: 2.1 10E9/L (ref 0.8–5.3)
LYMPHOCYTES NFR BLD AUTO: 13.1 %
MCH RBC QN AUTO: 33.3 PG (ref 26.5–33)
MCHC RBC AUTO-ENTMCNC: 33.7 G/DL (ref 31.5–36.5)
MCV RBC AUTO: 99 FL (ref 78–100)
MONOCYTES # BLD AUTO: 1.1 10E9/L (ref 0–1.3)
MONOCYTES NFR BLD AUTO: 7 %
NEUTROPHILS # BLD AUTO: 12.8 10E9/L (ref 1.6–8.3)
NEUTROPHILS NFR BLD AUTO: 79.3 %
NITRATE UR QL: POSITIVE
PH UR STRIP: 6.5 PH (ref 5–7)
PLATELET # BLD AUTO: 224 10E9/L (ref 150–450)
POTASSIUM SERPL-SCNC: 4.4 MMOL/L (ref 3.4–5.3)
PROT SERPL-MCNC: 7.8 G/DL (ref 6.8–8.8)
RBC # BLD AUTO: 4.6 10E12/L (ref 4.4–5.9)
RBC #/AREA URNS AUTO: 0 /HPF (ref 0–2)
SODIUM SERPL-SCNC: 129 MMOL/L (ref 133–144)
SOURCE: ABNORMAL
SP GR UR STRIP: 1.01 (ref 1–1.03)
TROPONIN I SERPL-MCNC: <0.015 UG/L (ref 0–0.04)
UROBILINOGEN UR STRIP-MCNC: NORMAL MG/DL (ref 0–2)
WBC # BLD AUTO: 16.1 10E9/L (ref 4–11)
WBC #/AREA URNS AUTO: 11 /HPF (ref 0–2)

## 2018-01-02 PROCEDURE — 81001 URINALYSIS AUTO W/SCOPE: CPT | Performed by: FAMILY MEDICINE

## 2018-01-02 PROCEDURE — 96375 TX/PRO/DX INJ NEW DRUG ADDON: CPT | Performed by: FAMILY MEDICINE

## 2018-01-02 PROCEDURE — 25000125 ZZHC RX 250: Performed by: FAMILY MEDICINE

## 2018-01-02 PROCEDURE — 74177 CT ABD & PELVIS W/CONTRAST: CPT

## 2018-01-02 PROCEDURE — 25000128 H RX IP 250 OP 636: Performed by: FAMILY MEDICINE

## 2018-01-02 PROCEDURE — 87086 URINE CULTURE/COLONY COUNT: CPT | Performed by: FAMILY MEDICINE

## 2018-01-02 PROCEDURE — 87088 URINE BACTERIA CULTURE: CPT | Performed by: FAMILY MEDICINE

## 2018-01-02 PROCEDURE — 87186 SC STD MICRODIL/AGAR DIL: CPT | Performed by: FAMILY MEDICINE

## 2018-01-02 PROCEDURE — 96374 THER/PROPH/DIAG INJ IV PUSH: CPT | Performed by: FAMILY MEDICINE

## 2018-01-02 PROCEDURE — 82150 ASSAY OF AMYLASE: CPT | Performed by: FAMILY MEDICINE

## 2018-01-02 RX ORDER — IOPAMIDOL 755 MG/ML
97 INJECTION, SOLUTION INTRAVASCULAR ONCE
Status: COMPLETED | OUTPATIENT
Start: 2018-01-02 | End: 2018-01-02

## 2018-01-02 RX ORDER — HYDROMORPHONE HYDROCHLORIDE 1 MG/ML
0.5 INJECTION, SOLUTION INTRAMUSCULAR; INTRAVENOUS; SUBCUTANEOUS ONCE
Status: COMPLETED | OUTPATIENT
Start: 2018-01-02 | End: 2018-01-02

## 2018-01-02 RX ORDER — ONDANSETRON 2 MG/ML
4 INJECTION INTRAMUSCULAR; INTRAVENOUS
Status: DISCONTINUED | OUTPATIENT
Start: 2018-01-02 | End: 2018-01-02 | Stop reason: HOSPADM

## 2018-01-02 RX ADMIN — ONDANSETRON 4 MG: 2 INJECTION INTRAMUSCULAR; INTRAVENOUS at 02:58

## 2018-01-02 RX ADMIN — IOPAMIDOL 97 ML: 755 INJECTION, SOLUTION INTRAVENOUS at 00:29

## 2018-01-02 RX ADMIN — Medication 0.5 MG: at 03:02

## 2018-01-02 RX ADMIN — SODIUM CHLORIDE 64 ML: 9 INJECTION, SOLUTION INTRAVENOUS at 00:29

## 2018-01-02 NOTE — ED NOTES
Pt and family updated on plan of care, transfer info and provided map to [facility]. Pt reports pain is rated 1-2/10 and is feeling much more comfortable

## 2018-01-02 NOTE — ED PROVIDER NOTES
HPI  Patient is a 72-year-old male presenting by private car with his wife for left upper quadrant abdominal pain.  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  He has history of reflux, diabetes, atrial fibrillation.  He has had diverticulitis and colectomy.  No medication changes recently.    The patient describes 3-4 days of left upper quadrant abdominal pain.  The pain has worsened with time.  He currently rates the pain is moderate.  He denies radiating symptoms into the chest, back, flank, groin or testicle.  He does have some mild nausea today with retching.  No vomiting described.  No nausea currently.  He denies diarrhea or constipation.  No hematochezia or melena.  No dysuria, urgency, frequency, or hematuria.  No fever.  No cough or shortness of breath.  No trauma or injury.    ROS: All other review of systems are negative other than that noted above.     Past Medical History:   Diagnosis Date     C. 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     Noninfectious ileitis      Past Surgical History:   Procedure Laterality Date     BREAST SURGERY  2008    right breast mass benign     COLONOSCOPY      multiple polyps removed     COLONOSCOPY  8/24/2011    Procedure:COMBINED COLONOSCOPY, REMOVE TUMOR/POLYP/LESION BY SNARE; Surgeon:MILEY ARBOLEDA; Location:[facility]     COLONOSCOPY  12/17/2012    Procedure: COLONOSCOPY;;  Surgeon: Leon Maurer MD;  Location:  GI     COLONOSCOPY  12/18/2012    Procedure: COLONOSCOPY;;  Surgeon: Leon Maurer MD;  Location:  GI     DENERVATION OF SPERMATIC CORD MICROSURGICAL Left 5/23/2017    Procedure: DENERVATION OF SPERMATIC CORD MICROSURGICAL;;  Surgeon: Marcio Aggarwal MD;  [facility]     DISSECTION RADICAL NECK BILATERAL   8/2/2012    Procedure: DISSECTION RADICAL NECK BILATERAL;;  Surgeon: Yung Alvares MD;  [facility]     ENDOSCOPIC RETROGRADE CHOLANGIOPANCREATOGRAM N/A 5/10/2016    Procedure: COMBINED ENDOSCOPIC RETROGRADE CHOLANGIOPANCREATOGRAPHY, PLACE TUBE/STENT;  Surgeon: Yovanny Beasley MD;  [facility]     ENDOSCOPIC ULTRASOUND UPPER GASTROINTESTINAL TRACT (GI) N/A 2/3/2016    Procedure: ENDOSCOPIC ULTRASOUND, ESOPHAGOSCOPY / UPPER GASTROINTESTINAL TRACT (GI);  Surgeon: Grabiel Plata MD;  [facility]     ESOPHAGOSCOPY, GASTROSCOPY, DUODENOSCOPY (EGD), COMBINED N/A 2/3/2016    Procedure: COMBINED ENDOSCOPIC ULTRASOUND, ESOPHAGOSCOPY, GASTROSCOPY, DUODENOSCOPY (EGD), FINE NEEDLE ASPIRATE/BIOPSY;  Surgeon: Grabiel Plata MD;  [facility]     ESOPHAGOSCOPY, GASTROSCOPY, DUODENOSCOPY (EGD), COMBINED N/A 6/8/2016    Procedure: COMBINED ESOPHAGOSCOPY, GASTROSCOPY, DUODENOSCOPY (EGD), REMOVE FOREIGN BODY;  Surgeon: Yovanny Beasley MD;  [facility]     EXCISE LESION INTRAORAL  6/14/2012    Procedure: EXCISE LESION INTRAORAL;  Wide Local Excision Floor of Mouth, Direct Laryngoscopy, Bilateral Ida's Marsuplization, Split Thickness Skin Graft from right Thigh  Latex Safe;  Surgeon: Gerson Ravi MD;  [facility]     EXCISE LESION INTRAORAL  8/2/2012    Procedure: EXCISE LESION INTRAORAL;  Floor of Mouth Resection, Bilateral Selective Radical Neck Dissection, Tracheostomy, Left Radial Forearm  Free Flap with Alloderm, Nasogastric Feeding Tube Placement,    * Latex Safe*;  Surgeon: Gerson Ravi MD;  [facility]     EXCISE LESION INTRAORAL  12/11/2012    Procedure: EXCISE LESION INTRAORAL;  takedown of oral flap;  Surgeon: Yung Alvares MD;  [facility]     GRAFT FREE VASCULARIZED (LOCATION)  8/2/2012    Procedure: GRAFT FREE VASCULARIZED (LOCATION);;  Surgeon: Yung Alvares MD;  [facility]     GRAFT SKIN SPLIT THICKNESS FROM EXTREMITY  6/14/2012     "Procedure: GRAFT SKIN SPLIT THICKNESS FROM EXTREMITY;;  Surgeon: Gerson Ravi MD;  [facility]     LAPAROSCOPIC ILEOSTOMY TAKEDOWN  2013    Procedure: LAPAROSCOPIC ILEOSTOMY TAKEDOWN;  Laparoscopic Closure of Enterostomy, Guerda's Type with IleoRectal Anastomosis ;  Surgeon: Grabiel Riddle MD;  [facility]     LAPAROTOMY EXPLORATORY  2012    Procedure: LAPAROTOMY EXPLORATORY;  Exploratory Laparotomy, total abdominal colectomy, ileostomy formation;  Surgeon: Miquel Cannon MD;  [facility]     LARYNGOSCOPY  2012    Procedure: LARYNGOSCOPY;;  Surgeon: Gerson Ravi MD;  [facility]     ORTHOPEDIC SURGERY      ganglian cyst left ankle     PANCREATECTOMY, SPLENECTOMY N/A 3/10/2016    Procedure: PANCREATECTOMY, SPLENECTOMY;  Surgeon: Nael Abel MD;  [facility]     SHOULDER SURGERY  ,     2006- right rotator cuff,  bone spur on left. Dr. Hdez     VARICOCELECTOMY Left 2017    Procedure: VARICOCELECTOMY;  Left Varicocele Repair, Denervation of Left Testis;  Surgeon: Marcio Aggarwal MD;  [facility]     Family History   Problem Relation Age of Onset     DIABETES Sister      onset age 50     Alzheimer Disease Mother       80     Alzheimer Disease Father       85     DIABETES Other      nephew type 1     DIABETES Other      Anesthesia Reaction No family hx of      Colon Cancer No family hx of      Colon Polyps No family hx of      Crohn Disease No family hx of      Ulcerative Colitis No family hx of      Social History   Substance Use Topics     Smoking status: Former Smoker     Packs/day: 1.00     Years: 40.00     Types: Cigarettes     Quit date: 2006     Smokeless tobacco: Never Used     Alcohol use Yes      Comment: 6-12 beers/daily for at least 40 years; now 3-4 beers daily now         PHYSICAL  BP (!) 190/91  Temp 97.5  F (36.4  C) (Oral)  Resp 16  Ht 1.778 m (5' 10\")  Wt 90.7 kg (200 lb)  SpO2 94%  BMI 28.7 kg/m2  General: " Patient is alert and in moderate distress.  OW.  Neurological: Alert.  Moving upper and lower extremities equally, bilaterally.  Head / Neck: Atraumatic.  Ears: Not done.  Eyes: Pupils are equal, round, and reactive.  Normal conjunctiva.  Nose: Midline.  No epistaxis.  Mouth / Throat: No ulcerations or lesions.  Upper pharynx is not erythematous.  Moist.  Respiratory: No respiratory distress. CTA B.  Cardiovascular: Regular rhythm.  Peripheral extremities are warm.  No edema.  No calf tenderness.  Abdomen / Pelvis: Tender in the left upper quadrant.  No distention.  Soft throughout.  Genitalia: Not done.  Musculoskeletal: No tenderness over major muscles and joints.  Skin: No evidence of rash or trauma.        PHYSICIAN  2354.  The patient has left upper quadrant abdominal pain and tenderness.  Lab values pending.  CT imaging pending.  Patient refuses analgesia.  Fluid bolus ordered.  Urinalysis order pending per    Labs Ordered and Resulted from Time of ED Arrival Up to the Time of Departure from the ED   CBC WITH PLATELETS DIFFERENTIAL - Abnormal; Notable for the following:        Result Value    WBC 16.1 (*)     MCH 33.3 (*)     Absolute Neutrophil 12.8 (*)     All other components within normal limits   COMPREHENSIVE METABOLIC PANEL - Abnormal; Notable for the following:     Sodium 129 (*)     Glucose 282 (*)     All other components within normal limits   LIPASE - Abnormal; Notable for the following:     Lipase 1867 (*)     All other components within normal limits   ROUTINE UA WITH MICROSCOPIC REFLEX TO CULTURE - Abnormal; Notable for the following:     Glucose Urine >1000 (*)     Protein Albumin Urine 10 (*)     Nitrite Urine Positive (*)     Leukocyte Esterase Urine Small (*)     WBC Urine 11 (*)     Bacteria Urine Few (*)     All other components within normal limits   TROPONIN I   AMYLASE     IMAGING  Images reviewed by me.  Radiology report also reviewed.  Abd/pelvis CT, IV contrast only TRAUMA  / AAA    Final Result   IMPRESSION:   1. Moderate left upper quadrant fluid and stranding between the   pancreas and stomach. Wall thickening involving the gastric fundus and   body, greater along the lesser curvature.   2. Suspect findings are most likely due to pancreatitis with secondary   gastric wall thickening/edema. Gastritis or other causes of gastric   wall thickening including peptic ulcer disease or malignancy are not   excluded.   3. Distal pancreatectomy and splenectomy.   4. New small 1.5 cm cystic area along the remaining margin of the   pancreatic body, indeterminate but may be a pseudocyst.      FANG DIALLO MD        0148.  The patient has an abnormal CT scan, suggesting pancreatitis with secondary gastric wall thickening/edema.  There are other potential causes for the abnormal findings as well.  The patient does have an elevated lipase at about 1800.  However, his amylase is normal.  The patient does have a known history of pancreatic mass which was removed in 2016.  At the same time, he had his splenectomy and partial colectomy.  This procedure was done at the [facility].  The patient also tells me that he drinks alcohol 3-4 beers daily.  Known history of 6-12 beers daily for about 40 years.  Awaiting call back from the hospitalist to determine whether or not the patient will be hospitalized here or transferred to the [place].    0240.  Dr. Guo believes the patient would be better served to see a gastroenterologist.  The [facility] does not have any beds available.  [facility] is being called.  They do have a GI service.    0304.  Dr. Serrano at [facility] will accept the patient to her service.  Patient will be transferred there by ambulance.  He has received Dilaudid and Zofran here.  He continues to receive IV fluid.      IMPRESSION    ICD-10-CM    1. Acute pancreatitis, unspecified complication status, unspecified pancreatitis type K85.90             Critical Care time:  none                    Tremayne Ernandez MD  01/02/18 0898

## 2018-01-03 LAB
BACTERIA SPEC CULT: ABNORMAL
BACTERIA SPEC CULT: ABNORMAL
Lab: ABNORMAL
SPECIMEN SOURCE: ABNORMAL

## 2018-01-09 ENCOUNTER — OFFICE VISIT (OUTPATIENT)
Dept: FAMILY MEDICINE | Facility: CLINIC | Age: 73
End: 2018-01-09
Payer: COMMERCIAL

## 2018-01-09 VITALS
DIASTOLIC BLOOD PRESSURE: 84 MMHG | SYSTOLIC BLOOD PRESSURE: 124 MMHG | TEMPERATURE: 98.4 F | WEIGHT: 203.6 LBS | OXYGEN SATURATION: 97 % | HEART RATE: 70 BPM | BODY MASS INDEX: 29.21 KG/M2

## 2018-01-09 DIAGNOSIS — K85.20 ALCOHOL-INDUCED ACUTE PANCREATITIS WITHOUT INFECTION OR NECROSIS: ICD-10-CM

## 2018-01-09 DIAGNOSIS — R39.15 URGENCY OF URINATION: ICD-10-CM

## 2018-01-09 DIAGNOSIS — I10 HYPERTENSION, BENIGN ESSENTIAL, GOAL BELOW 140/90: ICD-10-CM

## 2018-01-09 DIAGNOSIS — I48.91 ATRIAL FIBRILLATION WITH RVR (H): ICD-10-CM

## 2018-01-09 DIAGNOSIS — E11.42 TYPE 2 DIABETES MELLITUS WITH DIABETIC POLYNEUROPATHY, WITHOUT LONG-TERM CURRENT USE OF INSULIN (H): Primary | ICD-10-CM

## 2018-01-09 DIAGNOSIS — N40.0 HYPERTROPHY OF PROSTATE WITHOUT URINARY OBSTRUCTION: ICD-10-CM

## 2018-01-09 PROCEDURE — 99214 OFFICE O/P EST MOD 30 MIN: CPT | Performed by: FAMILY MEDICINE

## 2018-01-09 PROCEDURE — 93000 ELECTROCARDIOGRAM COMPLETE: CPT | Performed by: FAMILY MEDICINE

## 2018-01-09 RX ORDER — TAMSULOSIN HYDROCHLORIDE 0.4 MG/1
0.4 CAPSULE ORAL DAILY
Qty: 90 CAPSULE | Refills: 3 | Status: SHIPPED | OUTPATIENT
Start: 2018-01-09 | End: 2019-02-13

## 2018-01-09 NOTE — NURSING NOTE
"Chief Complaint   Patient presents with     Diabetes     Hypertension       Initial /84  Pulse 70  Temp 98.4  F (36.9  C) (Tympanic)  Wt 203 lb 9.6 oz (92.4 kg)  SpO2 97%  BMI 29.21 kg/m2 Estimated body mass index is 29.21 kg/(m^2) as calculated from the following:    Height as of 1/1/18: 5' 10\" (1.778 m).    Weight as of this encounter: 203 lb 9.6 oz (92.4 kg).  Medication Reconciliation: complete    Health Maintenance that is potentially due pending provider review:  Colonoscopy/FIT    Pt declines to have colonoscopy.    Is there anyone who you would like to be able to receive your results? No  If yes have patient fill out VIDYA      "

## 2018-01-09 NOTE — PROGRESS NOTES
SUBJECTIVE:   Antoine Russo is a 72 year old male who presents to clinic today for the following health issues:      Diabetes Follow-up      Patient is checking blood sugars: not at all    Diabetic concerns: None     Symptoms of hypoglycemia (low blood sugar): none     Paresthesias (numbness or burning in feet) or sores: No     Date of last diabetic eye exam: within year    BP Readings from Last 2 Encounters:   01/09/18 124/84   01/02/18 177/90     Hemoglobin A1C (%)   Date Value   10/10/2017 7.0 (H)   07/13/2017 6.4 (H)     LDL Cholesterol Calculated (mg/dL)   Date Value   07/13/2017 100 (H)   07/15/2016 110 (H)     A1C was 8.6 on 1/2/18      Hospital Followup:    Facility:  [facility]  Date of visit: 1/1/18  Reason for visit: Pancreatitis  Current Status: Good     Was in 1/2-4/18. Bowel rest and fluids and he recovered.  No more pain. Is limiting his drinking to 2 beers/day.    Currently noted to be in afib. No chest pain or shortness of breath. Noted prior at one of his hospitalizations.     BPH-asks if I can reorder the tamulosin, works very well. Started by urology.     Problem list and histories reviewed & adjusted, as indicated.  Additional history: as documented    Recent Labs   Lab Test  01/01/18   2335  10/10/17   0912  07/31/17   0808  07/13/17   0828  05/09/17   1021  04/04/17   0831  01/03/17   0837   07/15/16   0904   01/18/16   0843   A1C   --   7.0*   --   6.4*   --   8.9*  8.0*   --   6.6*   < >  6.6*   LDL   --    --    --   100*   --    --    --    --   110*   --   86   HDL   --    --    --   30*   --    --    --    --   43   --   39*   TRIG   --    --    --   200*   --    --    --    --   134   --   136   ALT  39   --    --    --    --    --   22   --   24   < >  59   CR  0.83   --    --    --   0.77   --   0.76   --   0.64*   < >  0.67   GFRESTIMATED  >90   --   66   --   >90  Non  GFR Calc     --   >90  Non  GFR Calc     < >  >90  Non   American GFR Calc     < >  >90  Non  GFR Calc     GFRESTBLACK  >90   --   80   --   >90   GFR Calc     --   >90   GFR Calc     < >  >90   GFR Calc     < >  >90   GFR Calc     POTASSIUM  4.4   --    --    --   4.3   --   4.0   --   4.4   < >  4.8   TSH   --    --    --    --    --    --   1.37   --    --    --   1.51    < > = values in this interval not displayed.      BP Readings from Last 3 Encounters:   01/09/18 124/84   01/02/18 177/90   11/15/17 160/76    Wt Readings from Last 3 Encounters:   01/09/18 203 lb 9.6 oz (92.4 kg)   01/01/18 200 lb (90.7 kg)   10/10/17 203 lb (92.1 kg)               Reviewed and updated as needed this visit by clinical staffTobacco  Allergies  Meds  Problems       Reviewed and updated as needed this visit by Provider  Allergies  Meds  Problems         ROS:  C: NEGATIVE for fever, chills, change in weight  E/M: NEGATIVE for ear, mouth and throat problems  R: NEGATIVE for significant cough or SOB  CV: NEGATIVE for chest pain, palpitations or peripheral edema  GI: NEGATIVE for nausea, abdominal pain, heartburn, or change in bowel habits  : negative for dysuria, hematuria, decreased urinary stream, erectile dysfunction    OBJECTIVE:                                                    /84  Pulse 70  Temp 98.4  F (36.9  C) (Tympanic)  Wt 203 lb 9.6 oz (92.4 kg)  SpO2 97%  BMI 29.21 kg/m2  Body mass index is 29.21 kg/(m^2).  GENERAL: healthy, alert, well nourished, well hydrated, no distress  HENT: ear canals- normal; TMs- normal; Nose- normal; Mouth- no ulcers, no lesions  NECK: no tenderness, no adenopathy, no asymmetry, no masses, no stiffness; thyroid- normal to palpation  RESP: lungs clear to auscultation - no rales, no rhonchi, no wheezes  CV: regular rates and rhythm, normal S1 S2, no S3 or S4 and no murmur, no click or rub -  ABDOMEN: soft, no tenderness, multiple scars no   hepatosplenomegaly, no masses, normal bowel sounds  MS: extremities- no gross deformities noted, no edema    EKG shows afib with RVR     ASSESSMENT/PLAN:                                                      ASSESSMENT:  1. Type 2 diabetes mellitus with diabetic polyneuropathy, without long-term current use of insulin (H)    2. Atrial fibrillation with RVR (H)    3. Urgency of urination    4. Hypertrophy of prostate without urinary obstruction    5. Hypertension, benign essential, goal below 140/90    6. Alcohol-induced acute pancreatitis without infection or necrosis        PLAN:  Orders Placed This Encounter     CARDIOLOGY EVAL ADULT REFERRAL     tamsulosin (FLOMAX) 0.4 MG capsule     rivaroxaban ANTICOAGULANT (XARELTO) 20 MG TABS tablet     Discussed stroke risk, his [id number] score is 3.  Will start anticoagulation. Does not want to be on Xarelto  Gone over benefits and risks, as well as side effects of medication.   Also discussed rate control, will increase metoprolol  Echocardiogram and see cardiology    Patient Instructions   Start Xarelto to prevent stroke one daily at supper     Increase the metoprolol to 50 mg twice a day    Get the echocardiogram     See cardiology     Katie Gross MD  [facility]

## 2018-01-09 NOTE — MR AVS SNAPSHOT
After Visit Summary   1/9/2018    Antoine Russo    MRN: [id number]           Patient Information     Date Of Birth          1945        Visit Information        Provider Department      1/9/2018 8:20 AM Katie Gross MD [facility]        Today's Diagnoses     Type 2 diabetes mellitus with diabetic polyneuropathy, without long-term current use of insulin (H)    -  1    Atrial fibrillation with RVR (H)        Urgency of urination        Hypertrophy of prostate without urinary obstruction        Hypertension, benign essential, goal below 140/90          Care Instructions    Start Xarelto to prevent stroke one daily at supper     Increase the metoprolol to 50 mg twice a day    Get the echocardiogram     See cardiology          Follow-ups after your visit        Additional Services     CARDIOLOGY EVAL ADULT REFERRAL       Your provider has referred you to:  [facility]: [facility] [phone]   [email or web address]    Please be aware that coverage of these services is subject to the terms and limitations of your health insurance plan.  Call member services at your health plan with any benefit or coverage questions.      Type of Referral:  New EP Consult    Timeframe requested:  1 Week    Please bring the following to your appointment:  >>   Any x-rays, CTs or MRIs which have been performed.  Contact the facility where they were done to arrange for  prior to your scheduled appointment.    >>   List of current medications  >>   This referral request   >>   Any documents/labs given to you for this referral                  Your next 10 appointments already scheduled     Feb 01, 2018  7:40 AM CST   [facility]-C   [facility]    [address] [id number] [phone]              Future tests that were  ordered for you today     Open Future Orders        Priority Expected Expires Ordered    Echocardiogram Complete Routine  1/9/2019 1/9/2018            Who to contact     If you have questions or need follow up information about today's clinic visit or your schedule please contact [facility] directly at [phone].  Normal or non-critical lab and imaging results will be communicated to you by MyChart, letter or phone within 4 business days after the clinic has received the results. If you do not hear from us within 7 days, please contact the clinic through [employer]Chart or phone. If you have a critical or abnormal lab result, we will notify you by phone as soon as possible.  Submit refill requests through [employer] or call your pharmacy and they will forward the refill request to us. Please allow 3 business days for your refill to be completed.          Additional Information About Your Visit        MyChart Information     [employer] gives you secure access to your electronic health record. If you see a primary care provider, you can also send messages to your care team and make appointments. If you have questions, please call your primary care clinic.  If you do not have a primary care provider, please call [phone] and they will assist you.        Care EveryWhere ID     This is your Care EveryWhere ID. This could be used by other organizations to access your [facility] medical records  [id number]        Your Vitals Were     Pulse Temperature Pulse Oximetry BMI (Body Mass Index)          70 98.4  F (36.9  C) (Tympanic) 97% 29.21 kg/m2         Blood Pressure from Last 3 Encounters:   01/09/18 124/84   01/02/18 177/90   11/15/17 160/76    Weight from Last 3 Encounters:   01/09/18 203 lb 9.6 oz (92.4 kg)   01/01/18 200 lb (90.7 kg)   10/10/17 203 lb (92.1 kg)              We Performed the Following     CARDIOLOGY EVAL ADULT REFERRAL     EKG 12-lead complete w/read - Clinics          Today's Medication  Changes          These changes are accurate as of: 1/9/18  9:54 AM.  If you have any questions, ask your nurse or doctor.               Start taking these medicines.        Dose/Directions    rivaroxaban ANTICOAGULANT 20 MG Tabs tablet   Commonly known as:  XARELTO   Used for:  Atrial fibrillation with RVR (H)   Started by:  Katie Gross MD        Dose:  20 mg   Take 1 tablet (20 mg) by mouth daily (with dinner)   Quantity:  30 tablet   Refills:  5         These medicines have changed or have updated prescriptions.        Dose/Directions    metFORMIN 750 MG 24 hr tablet   Commonly known as:  GLUCOPHAGE-XR   This may have changed:  when to take this   Used for:  Type 2 diabetes mellitus with diabetic polyneuropathy, without long-term current use of insulin (H)        Dose:  1500 mg   Take 2 tablets (1,500 mg) by mouth daily   Quantity:  180 tablet   Refills:  3       multivitamin, therapeutic with minerals Tabs tablet   This may have changed:  when to take this   Used for:  Surgery aftercare        Dose:  1 tablet   Take 1 tablet by mouth daily.   Quantity:  30 each   Refills:  1            Where to get your medicines      These medications were sent to [facility] [address] [place] [address] [address] [place] [address]  [address] [address]     Phone:  [phone]     rivaroxaban ANTICOAGULANT 20 MG Tabs tablet    tamsulosin 0.4 MG capsule                Primary Care Provider Office Phone # Fax #    Katie Gross [phone]       [address] [facility] [address]        Equal Access to Services     [facility] CenterVISHNU AH: Hadii stoney polancoo Sotomy, waaxda luqadaha, qaybta kaalmada christosyada, koffi fay. So [facility] [phone].    ATENCIÓN: Si habla español, tiene a snyder disposición servicios gratuitos de asistencia lingüística. Llame al [phone].    We comply with applicable federal civil rights laws and [place] laws. We do not discriminate on the basis  of race, color, national origin, age, disability, sex, sexual orientation, or gender identity.            Thank you!     Thank you for choosing [facility]  for your care. Our goal is always to provide you with excellent care. Hearing back from our patients is one way we can continue to improve our services. Please take a few minutes to complete the written survey that you may receive in the mail after your visit with us. Thank you!             Your Updated Medication List - Protect others around you: Learn how to safely use, store and throw away your medicines at www.disposemymeds.org.          This list is accurate as of: 1/9/18  9:54 AM.  Always use your most recent med list.                   Brand Name Dispense Instructions for use Diagnosis    gabapentin 300 MG capsule    NEURONTIN    60 capsule    Take 1 tablet (300 mg) every night for 3 days, then if needed, go to two at     Type 2 diabetes mellitus with diabetic polyneuropathy, without long-term current use of insulin (H)       glipiZIDE 2.5 MG 24 hr tablet    glipiZIDE XL    90 tablet    Take 1 tablet (2.5 mg) by mouth every morning    Type 2 diabetes mellitus with diabetic polyneuropathy, without long-term current use of insulin (H)       lisinopril 10 MG tablet    PRINIVIL/ZESTRIL    90 tablet    Take 1 tablet (10 mg) by mouth daily    Persistent proteinuria       metFORMIN 750 MG 24 hr tablet    GLUCOPHAGE-XR    180 tablet    Take 2 tablets (1,500 mg) by mouth daily    Type 2 diabetes mellitus with diabetic polyneuropathy, without long-term current use of insulin (H)       metoprolol 50 MG 24 hr tablet    TOPROL-XL    90 tablet    Take 1 tablet (50 mg) by mouth daily    Hypertension, benign essential, goal below 140/90       multivitamin, therapeutic with minerals Tabs tablet     30 each    Take 1 tablet by mouth daily.    Surgery aftercare       rivaroxaban ANTICOAGULANT 20 MG Tabs tablet    XARELTO    30 tablet    Take 1 tablet (20  mg) by mouth daily (with dinner)    Atrial fibrillation with RVR (H)       tamsulosin 0.4 MG capsule    FLOMAX    90 capsule    Take 1 capsule (0.4 mg) by mouth daily    Urgency of urination, Hypertrophy of prostate without urinary obstruction

## 2018-01-09 NOTE — PATIENT INSTRUCTIONS
Start Xarelto to prevent stroke one daily at supper     Increase the metoprolol to 50 mg twice a day    Get the echocardiogram     See cardiology

## 2018-01-10 ENCOUNTER — HOSPITAL ENCOUNTER (OUTPATIENT)
Dept: CARDIOLOGY | Facility: CLINIC | Age: 73
Discharge: HOME | End: 2018-01-10
Attending: FAMILY MEDICINE | Admitting: FAMILY MEDICINE
Payer: COMMERCIAL

## 2018-01-10 DIAGNOSIS — I48.91 ATRIAL FIBRILLATION WITH RVR (H): ICD-10-CM

## 2018-01-10 PROCEDURE — 93306 TTE W/DOPPLER COMPLETE: CPT | Mod: 26 | Performed by: INTERNAL MEDICINE

## 2018-01-10 PROCEDURE — 93306 TTE W/DOPPLER COMPLETE: CPT

## 2018-01-11 PROBLEM — K85.20 ALCOHOL-INDUCED ACUTE PANCREATITIS WITHOUT INFECTION OR NECROSIS: Status: ACTIVE | Noted: 2018-01-11

## 2018-01-12 ENCOUNTER — TELEPHONE (OUTPATIENT)
Dept: FAMILY MEDICINE | Facility: CLINIC | Age: 73
End: 2018-01-12

## 2018-01-12 NOTE — TELEPHONE ENCOUNTER
Poli says he called to make apt with the EP cardiologist and they told him it would be March for the soonest so he is worried about that and wonders if Dr Gross can do anything to get him in sooner. He is aware that she is out of office today. OK to call next week. He said to call him rather than My Chart because he will be in [place] and won't have access to his computer.    Notes Recorded by Troy Long, Katie Okeefe MD on 1/12/2018 at 12:30 PM  Poli, everything looks normal in the heart except the rhythm, which is atrial fibrillation as we discussed.   So that is good. See cardiology as we planned.   Katie Yoedr MD

## 2018-01-22 ENCOUNTER — HOSPITAL ENCOUNTER (OUTPATIENT)
Facility: CLINIC | Age: 73
Setting detail: OBSERVATION
Discharge: HOME | End: 2018-01-23
Attending: FAMILY MEDICINE | Admitting: INTERNAL MEDICINE
Payer: COMMERCIAL

## 2018-01-22 ENCOUNTER — APPOINTMENT (OUTPATIENT)
Dept: CT IMAGING | Facility: CLINIC | Age: 73
End: 2018-01-22
Attending: FAMILY MEDICINE
Payer: COMMERCIAL

## 2018-01-22 DIAGNOSIS — K85.20 ALCOHOL-INDUCED ACUTE PANCREATITIS, UNSPECIFIED COMPLICATION STATUS: ICD-10-CM

## 2018-01-22 LAB
ALBUMIN SERPL-MCNC: 3.3 G/DL (ref 3.4–5)
ALP SERPL-CCNC: 56 U/L (ref 40–150)
ALT SERPL W P-5'-P-CCNC: 27 U/L (ref 0–70)
ANION GAP SERPL CALCULATED.3IONS-SCNC: 11 MMOL/L (ref 3–14)
AST SERPL W P-5'-P-CCNC: 14 U/L (ref 0–45)
BASOPHILS # BLD AUTO: 0 10E9/L (ref 0–0.2)
BASOPHILS NFR BLD AUTO: 0.1 %
BILIRUB SERPL-MCNC: 1 MG/DL (ref 0.2–1.3)
BUN SERPL-MCNC: 11 MG/DL (ref 7–30)
CALCIUM SERPL-MCNC: 9.2 MG/DL (ref 8.5–10.1)
CHLORIDE SERPL-SCNC: 100 MMOL/L (ref 94–109)
CO2 SERPL-SCNC: 23 MMOL/L (ref 20–32)
CREAT SERPL-MCNC: 0.82 MG/DL (ref 0.66–1.25)
DIFFERENTIAL METHOD BLD: ABNORMAL
EOSINOPHIL # BLD AUTO: 0 10E9/L (ref 0–0.7)
EOSINOPHIL NFR BLD AUTO: 0.1 %
ERYTHROCYTE [DISTWIDTH] IN BLOOD BY AUTOMATED COUNT: 14.1 % (ref 10–15)
ETHANOL SERPL-MCNC: <0.01 G/DL
GFR SERPL CREATININE-BSD FRML MDRD: >90 ML/MIN/1.7M2
GLUCOSE SERPL-MCNC: 172 MG/DL (ref 70–99)
HCT VFR BLD AUTO: 43.6 % (ref 40–53)
HGB BLD-MCNC: 14.3 G/DL (ref 13.3–17.7)
IMM GRANULOCYTES # BLD: 0.1 10E9/L (ref 0–0.4)
IMM GRANULOCYTES NFR BLD: 0.5 %
LIPASE SERPL-CCNC: 942 U/L (ref 73–393)
LYMPHOCYTES # BLD AUTO: 1.5 10E9/L (ref 0.8–5.3)
LYMPHOCYTES NFR BLD AUTO: 8.7 %
MCH RBC QN AUTO: 33.1 PG (ref 26.5–33)
MCHC RBC AUTO-ENTMCNC: 32.8 G/DL (ref 31.5–36.5)
MCV RBC AUTO: 101 FL (ref 78–100)
MONOCYTES # BLD AUTO: 1.4 10E9/L (ref 0–1.3)
MONOCYTES NFR BLD AUTO: 7.9 %
NEUTROPHILS # BLD AUTO: 14.4 10E9/L (ref 1.6–8.3)
NEUTROPHILS NFR BLD AUTO: 82.7 %
PLATELET # BLD AUTO: 349 10E9/L (ref 150–450)
POTASSIUM SERPL-SCNC: 4.1 MMOL/L (ref 3.4–5.3)
PROT SERPL-MCNC: 8.1 G/DL (ref 6.8–8.8)
RBC # BLD AUTO: 4.32 10E12/L (ref 4.4–5.9)
SODIUM SERPL-SCNC: 134 MMOL/L (ref 133–144)
WBC # BLD AUTO: 17.5 10E9/L (ref 4–11)

## 2018-01-22 PROCEDURE — 25000128 H RX IP 250 OP 636: Performed by: FAMILY MEDICINE

## 2018-01-22 PROCEDURE — 80320 DRUG SCREEN QUANTALCOHOLS: CPT | Performed by: FAMILY MEDICINE

## 2018-01-22 PROCEDURE — 83690 ASSAY OF LIPASE: CPT | Performed by: FAMILY MEDICINE

## 2018-01-22 PROCEDURE — 96375 TX/PRO/DX INJ NEW DRUG ADDON: CPT

## 2018-01-22 PROCEDURE — 80053 COMPREHEN METABOLIC PANEL: CPT | Performed by: FAMILY MEDICINE

## 2018-01-22 PROCEDURE — 25000125 ZZHC RX 250: Performed by: FAMILY MEDICINE

## 2018-01-22 PROCEDURE — 96365 THER/PROPH/DIAG IV INF INIT: CPT

## 2018-01-22 PROCEDURE — 96361 HYDRATE IV INFUSION ADD-ON: CPT

## 2018-01-22 PROCEDURE — 99285 EMERGENCY DEPT VISIT HI MDM: CPT | Mod: 25 | Performed by: FAMILY MEDICINE

## 2018-01-22 PROCEDURE — 99285 EMERGENCY DEPT VISIT HI MDM: CPT | Mod: 25

## 2018-01-22 PROCEDURE — 85025 COMPLETE CBC W/AUTO DIFF WBC: CPT | Performed by: FAMILY MEDICINE

## 2018-01-22 PROCEDURE — 74177 CT ABD & PELVIS W/CONTRAST: CPT

## 2018-01-22 RX ORDER — HYDROMORPHONE HYDROCHLORIDE 1 MG/ML
0.5 INJECTION, SOLUTION INTRAMUSCULAR; INTRAVENOUS; SUBCUTANEOUS
Status: DISCONTINUED | OUTPATIENT
Start: 2018-01-22 | End: 2018-01-23

## 2018-01-22 RX ORDER — SODIUM CHLORIDE 9 MG/ML
1000 INJECTION, SOLUTION INTRAVENOUS CONTINUOUS
Status: DISCONTINUED | OUTPATIENT
Start: 2018-01-22 | End: 2018-01-23 | Stop reason: HOSPADM

## 2018-01-22 RX ORDER — ONDANSETRON 2 MG/ML
4 INJECTION INTRAMUSCULAR; INTRAVENOUS ONCE
Status: COMPLETED | OUTPATIENT
Start: 2018-01-22 | End: 2018-01-22

## 2018-01-22 RX ORDER — IOPAMIDOL 755 MG/ML
100 INJECTION, SOLUTION INTRAVASCULAR ONCE
Status: COMPLETED | OUTPATIENT
Start: 2018-01-22 | End: 2018-01-23

## 2018-01-22 RX ADMIN — Medication 0.5 MG: at 22:44

## 2018-01-22 RX ADMIN — ONDANSETRON 4 MG: 2 INJECTION INTRAMUSCULAR; INTRAVENOUS at 22:44

## 2018-01-22 RX ADMIN — PANTOPRAZOLE SODIUM 40 MG: 40 INJECTION, POWDER, FOR SOLUTION INTRAVENOUS at 22:45

## 2018-01-22 RX ADMIN — SODIUM CHLORIDE 1000 ML: 9 INJECTION, SOLUTION INTRAVENOUS at 21:55

## 2018-01-22 RX ADMIN — FOLIC ACID: 5 INJECTION, SOLUTION INTRAMUSCULAR; INTRAVENOUS; SUBCUTANEOUS at 22:44

## 2018-01-22 RX ADMIN — SODIUM CHLORIDE 1000 ML: 9 INJECTION, SOLUTION INTRAVENOUS at 23:29

## 2018-01-22 NOTE — IP AVS SNAPSHOT
[facility]    [address] [address]    Phone:  [phone]    Fax:  [phone]                                       After Visit Summary   1/22/2018    Antoine Russo    MRN: [id number]           After Visit Summary Signature Page     I have received my discharge instructions, and my questions have been answered. I have discussed any challenges I see with this plan with the nurse or doctor.    ..........................................................................................................................................  Patient/Patient Representative Signature      ..........................................................................................................................................  Patient Representative Print Name and Relationship to Patient    ..................................................               ................................................  Date                                            Time    ..........................................................................................................................................  Reviewed by Signature/Title    ...................................................              ..............................................  Date                                                            Time

## 2018-01-22 NOTE — IP AVS SNAPSHOT
MRN:[id number]                      After Visit Summary   1/22/2018    Antoine Russo    MRN: [id number]           Thank you!     Thank you for choosing [facility] for your care. Our goal is always to provide you with excellent care. Hearing back from our patients is one way we can continue to improve our services. Please take a few minutes to complete the written survey that you may receive in the mail after you visit with us. Thank you!        Patient Information     Date Of Birth          1945        About your hospital stay     You were admitted on:  January 23, 2018 You last received care in the:  [facility]    You were discharged on:  January 23, 2018        Reason for your hospital stay       pancreatitis                  Who to Call     For medical emergencies, please call 911.  For non-urgent questions about your medical care, please call your primary care provider or clinic, [phone]          Attending Provider     Provider Specialty    Willam Toure MD Emergency Medicine    Jessica Finch MD Internal Medicine       Primary Care Provider Office Phone # Fax #    Katie Gross [phone]      After Care Instructions     Activity       Your activity upon discharge: activity as tolerated            Diet       Follow this diet upon discharge: Orders Placed This Encounter      Full Liquid Diet                  Follow-up Appointments     Follow-up and recommended labs and tests        Follow up with primary care provider, Katie Gross, within 7 days for hospital follow- up.  Please check CBC                  Your next 10 appointments already scheduled     Jan 30, 2018  3:00 PM CST   EP NEW with Domingo Ashraf MD   [facility] ([facility])    [address] [address]   [phone]            Feb 01, 2018  7:40 AM CST   Return Visit with Emani Gallo,  LUZ   [facility]    [address] [facility] [id number] [phone]              Further instructions from your care team       Medical Instructions:  1.  ABSTAIN FROM ALL FUTURE ALCOHOL  2.  Please see your primary care provider within 7-10 days of discharge.  We would like for you to have your CBC (blood counts) checked at that point in time. In addition, we would like for her to monitor the small cyst in your pancreas and defer to her judgement as to whether or not GI consultation would be neccessary.  3.  Return to the ED should you develop significant fever, chills, nausea, vomiting, black/bloody stools, or worsening abdominal pain.      Behavioral/Mental Health Resources  [place], [place], [place], [place], [place], [place], [place], [place] and Critical access hospital    **Patient should check with their health insurance to identify providers in network**    Crisis Lines:   -[place]: [place] Crisis Connection: (924) 344-8026/1-786.853.8084   -[place]: ([phone]    -[place]/ [place]/ [place]/ [place]/ [place]: [phone]   -[facility]: [employer] Crisis: ([phone]   -[facility]: [facility] Crisis Team (091) [phone] or 1-387.489.1976     Call the National Suicide Prevention Lifeline at 7-196-156-PKIJ (7310) to be connected to a counselor at a crisis center in your area if you, a family member, or friend are experiencing   thoughts of suicide. The call is FREE, confidential, and always available.       National [place] on Mental Illness of [place] ([place]) provides support groups and educational programs. Visit www.namihelps.org or call the [place] Helpline at 1-176.699.2303  Or 881-327-2681 for further information.       Crisis Mobile Serivces:   -[place]: [employer] [phone]   -[place]/ [place]/ [place]/ [place]/ [place]: [phone]   -[facility]: [employer] [phone]   -[place]: ([phone] or [phone]    Chemical Dependency Detox:  - [place] [facility]:  [phone] - can ask for other recommendations  - [facility]/[place]([facility]):  [phone]  - [facility]: [phone] [facility]  - [facility] ([facility]):  [phone]  - [facility] : [phone] [facility] ([facility]):  [phone]  - [place] ([facility]):  [phone]    Chemical Dependency Assessment:   - [place] [facility]: [phone]   - Behavioral Health Providers, can help find a provider near you:  [phone]  - No insurance- call county of residence and ask about applying for a Rule 25    Counseling/psychotherapy:  - Associated clinic of psychology - [place],/[place]/ \[facility]\""/[place] /other locations: [phone]  - Behavioral Healthcare Providers- Can help find a provider near you:  [phone]  - [facility] Services-[place]:  [phone]  - [facility]- [place]:  [phone]  - [facility]- [place]:  [phone]  - [facility]-[place]: [phone]  - [facility]- [place]/[facility]/other locations:  [phone]  - [facility]- [facility]/[place]:  [phone]  - [facility] - [place]:  [phone]  - [facility] - [place]:  [phone]  [facility]- [place]-based in [place]:  [phone]  - [place] [phone]  - [facility]- [place]: [phone]  - [facility]- [facility]:  [phone]  - [facility]- [place] [phone]  - [facility] located in [place] (not affiliated with [place]): [phone]  - [facility]- [place]:  [phone]/[place]: [phone] and other locations.  - [facility]- [place]:  [phone]  - [facility]- [place]:  [phone]  - [facility]- [place]: [phone]  - Walk in counseling center (Free counseling services)- [facility]: [phone]     Psychiatry/ Mental Health Medication management:  - Associated clinic of psychology- [place],/[place]/[facility]/ [place]/ [facility] locations: [phone]  - Behavioral Healthcare Providers- Can help find a provider near you, if you have preferred one or [facility] they can identify who is in network and assist with scheduling an appointment:  [phone]  - [facility]: [place]/[facility] locations : [phone]  - [place] [facility] - Dr Catrachito Vigil and other associates. Collaborative model  with PCP involvement:  [phone] (requires PCP referral specifically)  - [facility][1]- [place]:  [phone]  - [facility] Services: [place]:   [phone] (Requires individual to be engaged in counseling)  - [facility]- [place]: [phone] [facility]:  [phone]/[place]:  [phone]/ [place]:  [phone]  - [facility]- [place]:   [phone]  - [facility]- [place], [phone]  - [facility] Psychiatry - Medical students who rotate yearly:  [phone]    County Numbers  - [facility]: [phone]  - [facility] County: [phone]  - [place]: [phone]  - [facility] County : [phone]  - [facility]: [phone]  - [facility] County: [phone]  - [place]: [phone]  - [facility]: [phone]  - [place]: [phone]  - [facility]: [phone]    **Please note that this list does not include all agencies that provide services**    Care Transitions Team at [facility] [phone]          Pending Results     No orders found for last 3 day(s).            Statement of Approval     Ordered           01/23/18 1437  I have reviewed and agree with all the recommendations and orders detailed in this document.  EFFECTIVE NOW     Approved and electronically signed by:  Agnes Paris PA-C             Admission Information     Date & Time Provider Department Dept. Phone    1/22/2018 Jessica Finch MD [facility] [phone]      Your Vitals Were     Blood Pressure Pulse Temperature Respirations Weight Pulse Oximetry    104/70 70 97.9  F (36.6  C) (Oral) 22 90.7 kg (200 lb) 87%    BMI (Body Mass Index)                   28.7 kg/m2           MyChart Information     [employer] gives you secure access to your electronic health record. If you see a primary care provider, you can also send messages to your care team and make appointments. If you have questions, please call your primary care clinic.  If you do not have a primary care provider, please call [phone] and they will assist you.        Care EveryWhere ID     This is your Care EveryWhere ID. This could be used by other organizations to access your [facility] medical records  [id number]        Equal Access to Services     FRAN WALTON : Hadii stoney burns Sotomy, waaxda luqadaha, qaybta kaalmadae matthews, koffi davidson . So [facility] [phone].    ATENCIÓN: Si habla español, tiene a snyder disposición servicios gratuitos de asistencia lingüística. Llame al [phone].    We comply with applicable federal civil rights laws and [place] laws. We do not discriminate on the basis of race, color, national origin, age, disability, sex, sexual orientation, or gender identity.               Review of your medicines      CONTINUE these medicines which may have CHANGED, or have new prescriptions. If we are uncertain of the size of tablets/capsules you have at home, strength may be listed as something that might have changed.        Dose / Directions    gabapentin 300 MG capsule   Commonly known as:  NEURONTIN    This may have changed:    - how much to take  - how to take this  - when to take this  - additional instructions   Used for:  Type 2 diabetes mellitus with diabetic polyneuropathy, without long-term current use of insulin (H)        Take 1 tablet (300 mg) every night for 3 days, then if needed, go to two at HS   Quantity:  60 capsule   Refills:  3       metFORMIN 750 MG 24 hr tablet   Commonly known as:  GLUCOPHAGE-XR   This may have changed:  when to take this   Used for:  Type 2 diabetes mellitus with diabetic polyneuropathy, without long-term current use of insulin (H)        Dose:  1500 mg   Take 2 tablets (1,500 mg) by mouth daily   Quantity:  180 tablet   Refills:  3       metoprolol succinate 50 MG 24 hr tablet   Commonly known as:  TOPROL-XL   This may have changed:  when to take this   Used for:  Hypertension, benign essential, goal below 140/90        Dose:  50 mg   Take 1 tablet (50 mg) by mouth daily   Quantity:  90 tablet   Refills:  3       multivitamin, therapeutic with minerals Tabs tablet   This may have changed:  when to take this   Used for:  Surgery aftercare        Dose:  1 tablet   Take 1 tablet by mouth daily.   Quantity:  30 each   Refills:  1         CONTINUE these medicines which have NOT CHANGED        Dose / Directions    glipiZIDE 2.5 MG 24 hr tablet   Commonly known as:  glipiZIDE XL   Used for:  Type 2 diabetes mellitus with diabetic polyneuropathy, without long-term current use of insulin (H)        Dose:  2.5 mg   Take 1 tablet (2.5 mg) by mouth every morning   Quantity:  90 tablet   Refills:  3       lisinopril 10 MG tablet   Commonly known as:  PRINIVIL/ZESTRIL   Used for:  Persistent proteinuria        Dose:  10 mg   Take 1 tablet (10 mg) by mouth daily   Quantity:  90 tablet   Refills:  1       rivaroxaban ANTICOAGULANT 20 MG Tabs tablet   Commonly known as:  XARELTO   Used for:  Atrial fibrillation with RVR (H)        Dose:  20 mg   Take 1 tablet (20 mg) by mouth daily (with  dinner)   Quantity:  30 tablet   Refills:  5       tamsulosin 0.4 MG capsule   Commonly known as:  FLOMAX   Used for:  Urgency of urination, Hypertrophy of prostate without urinary obstruction        Dose:  0.4 mg   Take 1 capsule (0.4 mg) by mouth daily   Quantity:  90 capsule   Refills:  3                Protect others around you: Learn how to safely use, store and throw away your medicines at www.disposemymeds.org.             Medication List: This is a list of all your medications and when to take them. Check marks below indicate your daily home schedule. Keep this list as a reference.      Medications           Morning Afternoon Evening Bedtime As Needed    gabapentin 300 MG capsule   Commonly known as:  NEURONTIN   Take 1 tablet (300 mg) every night for 3 days, then if needed, go to two at    Last time this was given:  300 mg on 1/23/2018  6:55 AM                                glipiZIDE 2.5 MG 24 hr tablet   Commonly known as:  glipiZIDE XL   Take 1 tablet (2.5 mg) by mouth every morning                                lisinopril 10 MG tablet   Commonly known as:  PRINIVIL/ZESTRIL   Take 1 tablet (10 mg) by mouth daily   Last time this was given:  10 mg on 1/23/2018  8:10 AM                                metFORMIN 750 MG 24 hr tablet   Commonly known as:  GLUCOPHAGE-XR   Take 2 tablets (1,500 mg) by mouth daily                                metoprolol succinate 50 MG 24 hr tablet   Commonly known as:  TOPROL-XL   Take 1 tablet (50 mg) by mouth daily   Last time this was given:  50 mg on 1/23/2018  8:10 AM                                multivitamin, therapeutic with minerals Tabs tablet   Take 1 tablet by mouth daily.                                rivaroxaban ANTICOAGULANT 20 MG Tabs tablet   Commonly known as:  XARELTO   Take 1 tablet (20 mg) by mouth daily (with dinner)                                tamsulosin 0.4 MG capsule   Commonly known as:  FLOMAX   Take 1 capsule (0.4 mg) by mouth daily   Last  time this was given:  0.4 mg on 1/23/2018  8:10 AM

## 2018-01-22 NOTE — IP AVS SNAPSHOT
` ` Patient Information     Patient Name Sex     Antoine Russo ([id number]) Male 1945       Room Bed    [address]      Patient Demographics     Address Phone E-mail Address    [address] [place] [address] [phone] (Home) *Preferred*  [phone] (Mobile) [email or web address]      Patient Ethnicity & Race     Ethnic Group Patient Race    American White      Emergency Contact(s)     Name Relation Home Work Mobile    Amina Russo Spouse [phone] none [phone]    No Secondary Contact Other None        Documents on File        Status Date Received Description       Documents for the Patient    Face Sheet Received () 09     Face Sheet Received () 09     Privacy Notice - [place] Received 11/22/10     External Medication Information Consent Accepted () 09     Face Sheet Received () 03/22/10     Patient ID Received 12     Consent for Services - Hospital/Clinic Received () 11/19/10     External Medication Information Consent Accepted () 02/15/11     Consent for Services - Hospital/Clinic Received () 11     External Medication Information Consent Accepted () 12     HIM VIDYA Authorization - File Only   MyChart Authorization    Consent for Services - Hospital/Clinic Received () 12     Insurance Card Received 16 MEDICARE    Legal Documentation  01/15/13 SIGNATURE ATTESTATION STATEMENT, 2012    Consent for EHR Access  13 Copied from existing Consent for services - C/HOD collected on 2011    Speech Therapy Certification Received 03/15/13     External Medication Information Consent Accepted 13     HIM VIDYA Authorization  13 equiclaim    Consent for Services - Hospital/Clinic Received () 13     Consent for Services - UMP Received 13 UMP-Consent    Consent for Services - [facility]  13 GENERAL CONSENT FORM: SHARED EHR - ENGLISH     Insurance Card Received 16 BCBS    Consent for Services - Hospital/Clinic Received () 06/10/14     [facility] Specified Other       Physical Therapy Certification Received 05/26/15 Cert (5/26/15-7/7/15) Elect signed by Troy Long    Consent for Services - Hospital/Clinic Received () 06/11/15     Consent for Services/Privacy Notice - Hospital/Clinic Received () 16     Consent for Services/Privacy Notice - Hospital/Clinic-Esign Received 17     Consent for Services/Privacy Notice - Hospital/Clinic  () 16 CONSENT FOR SERVICE    Consent for Services - Hospital/Clinic  16 CONSENT FOR SERVICE    HIM VIDYA Authorization  16     Insurance Card Received 17     Care Everywhere Prospective Auth Received 11/15/17     Insurance Card  (Deleted) 11/15/04     Insurance Card  (Deleted) 03/15/07     Insurance Card  (Deleted) 07     Insurance Card Received (Deleted) 12     Insurance Card Received (Deleted) 13     [facility] Specified Other  (Deleted)      Insurance Card Received (Deleted) 14     Insurance Card Received (Deleted) 05/15/14        Documents for the Encounter    CMS IM for Patient Signature       Observation Notice Received 18     ECG   ECG Report      Admission Information     Attending Provider Admitting Provider Admission Type Admission Date/Time    Jessica Finch MD Khan, Shams, MD Emergency 185    Discharge Date Hospital Service Auth/Cert Status Service Area     General Medicine [facility]    Unit Room/Bed Admission Status       [place] [facility] 1006/1006-01 Admission (Confirmed)       Admission     Complaint    Pancreatitis      Hospital Account     Name Acct ID Class Status Primary Coverage    Antoine Russo [id number] Observation Open BCBS - BCBS OUT OF STATE            Guarantor Account (for Hospital Account #[id number])     Name Relation to Pt Service Area Active? Acct Type    Rafaela  Antoine LOPEZ  FCS Yes Personal/Family    Address Phone          [address] [place] [address] [phone](H)  none(O)              Coverage Information (for Hospital Account #[id number])     1. BCBS/BCBS OUT OF STATE     F/O Payor/Plan Precert #    BCBS/BCBS OUT OF STATE     Subscriber Subscriber #    Amina Russo [id number]    Address Phone    [address] [address]  [place], [place] [id number] [phone]          2. MEDICARE/MEDICARE     F/O Payor/Plan Precert #    MEDICARE/MEDICARE     Subscriber Subscriber #    Antoine Russo [id number]    Address Phone    [employer] CLAIM[employer]  [address]  [place] [address] [phone]

## 2018-01-23 VITALS
HEART RATE: 70 BPM | DIASTOLIC BLOOD PRESSURE: 70 MMHG | WEIGHT: 200 LBS | OXYGEN SATURATION: 87 % | TEMPERATURE: 97.9 F | SYSTOLIC BLOOD PRESSURE: 104 MMHG | BODY MASS INDEX: 28.7 KG/M2 | RESPIRATION RATE: 22 BRPM

## 2018-01-23 PROBLEM — E11.42 TYPE 2 DIABETES MELLITUS WITH DIABETIC POLYNEUROPATHY, WITHOUT LONG-TERM CURRENT USE OF INSULIN (H): Status: ACTIVE | Noted: 2017-04-04

## 2018-01-23 PROBLEM — I48.20 CHRONIC ATRIAL FIBRILLATION (H): Status: ACTIVE | Noted: 2018-01-23

## 2018-01-23 PROBLEM — Q89.01 SPLEEN ABSENT: Chronic | Status: ACTIVE | Noted: 2017-10-10

## 2018-01-23 PROBLEM — E11.42 TYPE 2 DIABETES MELLITUS WITH DIABETIC POLYNEUROPATHY, WITHOUT LONG-TERM CURRENT USE OF INSULIN (H): Chronic | Status: ACTIVE | Noted: 2017-04-04

## 2018-01-23 PROBLEM — K85.90 PANCREATITIS: Status: ACTIVE | Noted: 2018-01-23

## 2018-01-23 PROBLEM — I48.20 CHRONIC ATRIAL FIBRILLATION (H): Chronic | Status: ACTIVE | Noted: 2018-01-23

## 2018-01-23 LAB
ANION GAP SERPL CALCULATED.3IONS-SCNC: 10 MMOL/L (ref 3–14)
BASOPHILS # BLD AUTO: 0 10E9/L (ref 0–0.2)
BASOPHILS NFR BLD AUTO: 0.2 %
BUN SERPL-MCNC: 9 MG/DL (ref 7–30)
CALCIUM SERPL-MCNC: 8.1 MG/DL (ref 8.5–10.1)
CHLORIDE SERPL-SCNC: 106 MMOL/L (ref 94–109)
CO2 SERPL-SCNC: 21 MMOL/L (ref 20–32)
CREAT SERPL-MCNC: 0.77 MG/DL (ref 0.66–1.25)
DIFFERENTIAL METHOD BLD: ABNORMAL
EOSINOPHIL # BLD AUTO: 0 10E9/L (ref 0–0.7)
EOSINOPHIL NFR BLD AUTO: 0.3 %
ERYTHROCYTE [DISTWIDTH] IN BLOOD BY AUTOMATED COUNT: 14.2 % (ref 10–15)
GFR SERPL CREATININE-BSD FRML MDRD: >90 ML/MIN/1.7M2
GLUCOSE BLDC GLUCOMTR-MCNC: 123 MG/DL (ref 70–99)
GLUCOSE SERPL-MCNC: 162 MG/DL (ref 70–99)
HBA1C MFR BLD: 8.1 % (ref 4.3–6)
HCT VFR BLD AUTO: 38.7 % (ref 40–53)
HGB BLD-MCNC: 13.3 G/DL (ref 13.3–17.7)
IMM GRANULOCYTES # BLD: 0.1 10E9/L (ref 0–0.4)
IMM GRANULOCYTES NFR BLD: 0.3 %
LACTATE BLD-SCNC: 1 MMOL/L (ref 0.7–2)
LIPASE SERPL-CCNC: 297 U/L (ref 73–393)
LYMPHOCYTES # BLD AUTO: 2.3 10E9/L (ref 0.8–5.3)
LYMPHOCYTES NFR BLD AUTO: 14.6 %
MCH RBC QN AUTO: 34.7 PG (ref 26.5–33)
MCHC RBC AUTO-ENTMCNC: 34.4 G/DL (ref 31.5–36.5)
MCV RBC AUTO: 101 FL (ref 78–100)
MONOCYTES # BLD AUTO: 1.5 10E9/L (ref 0–1.3)
MONOCYTES NFR BLD AUTO: 9.5 %
MRSA DNA SPEC QL NAA+PROBE: NEGATIVE
NEUTROPHILS # BLD AUTO: 11.7 10E9/L (ref 1.6–8.3)
NEUTROPHILS NFR BLD AUTO: 75.1 %
PLATELET # BLD AUTO: 328 10E9/L (ref 150–450)
POTASSIUM SERPL-SCNC: 4.2 MMOL/L (ref 3.4–5.3)
RBC # BLD AUTO: 3.83 10E12/L (ref 4.4–5.9)
SODIUM SERPL-SCNC: 137 MMOL/L (ref 133–144)
SPECIMEN SOURCE: NORMAL
WBC # BLD AUTO: 15.6 10E9/L (ref 4–11)

## 2018-01-23 PROCEDURE — 96361 HYDRATE IV INFUSION ADD-ON: CPT

## 2018-01-23 PROCEDURE — 25000128 H RX IP 250 OP 636: Performed by: PHYSICIAN ASSISTANT

## 2018-01-23 PROCEDURE — 25000125 ZZHC RX 250: Performed by: FAMILY MEDICINE

## 2018-01-23 PROCEDURE — 99220 ZZC INITIAL OBSERVATION CARE,LEVL III: CPT | Performed by: PHYSICIAN ASSISTANT

## 2018-01-23 PROCEDURE — 25000125 ZZHC RX 250: Performed by: PHYSICIAN ASSISTANT

## 2018-01-23 PROCEDURE — G0378 HOSPITAL OBSERVATION PER HR: HCPCS

## 2018-01-23 PROCEDURE — 25000128 H RX IP 250 OP 636: Performed by: FAMILY MEDICINE

## 2018-01-23 PROCEDURE — 96372 THER/PROPH/DIAG INJ SC/IM: CPT

## 2018-01-23 PROCEDURE — 83036 HEMOGLOBIN GLYCOSYLATED A1C: CPT | Performed by: PHYSICIAN ASSISTANT

## 2018-01-23 PROCEDURE — 25000132 ZZH RX MED GY IP 250 OP 250 PS 637: Performed by: PHYSICIAN ASSISTANT

## 2018-01-23 PROCEDURE — 87640 STAPH A DNA AMP PROBE: CPT | Performed by: INTERNAL MEDICINE

## 2018-01-23 PROCEDURE — 25000131 ZZH RX MED GY IP 250 OP 636 PS 637: Performed by: PHYSICIAN ASSISTANT

## 2018-01-23 PROCEDURE — 74177 CT ABD & PELVIS W/CONTRAST: CPT

## 2018-01-23 PROCEDURE — 83605 ASSAY OF LACTIC ACID: CPT | Performed by: INTERNAL MEDICINE

## 2018-01-23 PROCEDURE — 87641 MR-STAPH DNA AMP PROBE: CPT | Performed by: INTERNAL MEDICINE

## 2018-01-23 PROCEDURE — 80048 BASIC METABOLIC PNL TOTAL CA: CPT | Performed by: INTERNAL MEDICINE

## 2018-01-23 PROCEDURE — 83690 ASSAY OF LIPASE: CPT | Performed by: INTERNAL MEDICINE

## 2018-01-23 PROCEDURE — 00000146 ZZHCL STATISTIC GLUCOSE BY METER IP

## 2018-01-23 PROCEDURE — 85025 COMPLETE CBC W/AUTO DIFF WBC: CPT | Performed by: INTERNAL MEDICINE

## 2018-01-23 PROCEDURE — 96376 TX/PRO/DX INJ SAME DRUG ADON: CPT

## 2018-01-23 PROCEDURE — 36415 COLL VENOUS BLD VENIPUNCTURE: CPT | Performed by: INTERNAL MEDICINE

## 2018-01-23 RX ORDER — SODIUM CHLORIDE 9 MG/ML
INJECTION, SOLUTION INTRAVENOUS CONTINUOUS
Status: DISCONTINUED | OUTPATIENT
Start: 2018-01-23 | End: 2018-01-23 | Stop reason: HOSPADM

## 2018-01-23 RX ORDER — LISINOPRIL 10 MG/1
10 TABLET ORAL DAILY
Status: DISCONTINUED | OUTPATIENT
Start: 2018-01-23 | End: 2018-01-23 | Stop reason: HOSPADM

## 2018-01-23 RX ORDER — METOPROLOL SUCCINATE 50 MG/1
50 TABLET, EXTENDED RELEASE ORAL DAILY
Status: DISCONTINUED | OUTPATIENT
Start: 2018-01-23 | End: 2018-01-23 | Stop reason: HOSPADM

## 2018-01-23 RX ORDER — NALOXONE HYDROCHLORIDE 0.4 MG/ML
.1-.4 INJECTION, SOLUTION INTRAMUSCULAR; INTRAVENOUS; SUBCUTANEOUS
Status: DISCONTINUED | OUTPATIENT
Start: 2018-01-23 | End: 2018-01-23 | Stop reason: HOSPADM

## 2018-01-23 RX ORDER — ONDANSETRON 2 MG/ML
4 INJECTION INTRAMUSCULAR; INTRAVENOUS EVERY 6 HOURS PRN
Status: DISCONTINUED | OUTPATIENT
Start: 2018-01-23 | End: 2018-01-23 | Stop reason: HOSPADM

## 2018-01-23 RX ORDER — HYDROMORPHONE HYDROCHLORIDE 1 MG/ML
.3-.5 INJECTION, SOLUTION INTRAMUSCULAR; INTRAVENOUS; SUBCUTANEOUS
Status: DISCONTINUED | OUTPATIENT
Start: 2018-01-23 | End: 2018-01-23 | Stop reason: HOSPADM

## 2018-01-23 RX ORDER — DEXTROSE MONOHYDRATE 25 G/50ML
25-50 INJECTION, SOLUTION INTRAVENOUS
Status: DISCONTINUED | OUTPATIENT
Start: 2018-01-23 | End: 2018-01-23 | Stop reason: HOSPADM

## 2018-01-23 RX ORDER — TAMSULOSIN HYDROCHLORIDE 0.4 MG/1
0.4 CAPSULE ORAL DAILY
Status: DISCONTINUED | OUTPATIENT
Start: 2018-01-23 | End: 2018-01-23 | Stop reason: HOSPADM

## 2018-01-23 RX ORDER — GABAPENTIN 300 MG/1
300 CAPSULE ORAL AT BEDTIME
Status: DISCONTINUED | OUTPATIENT
Start: 2018-01-23 | End: 2018-01-23 | Stop reason: HOSPADM

## 2018-01-23 RX ORDER — NICOTINE POLACRILEX 4 MG
15-30 LOZENGE BUCCAL
Status: DISCONTINUED | OUTPATIENT
Start: 2018-01-23 | End: 2018-01-23 | Stop reason: HOSPADM

## 2018-01-23 RX ORDER — ONDANSETRON 4 MG/1
4 TABLET, ORALLY DISINTEGRATING ORAL EVERY 6 HOURS PRN
Status: DISCONTINUED | OUTPATIENT
Start: 2018-01-23 | End: 2018-01-23 | Stop reason: HOSPADM

## 2018-01-23 RX ORDER — LORAZEPAM 1 MG/1
1-2 TABLET ORAL EVERY 30 MIN PRN
Status: DISCONTINUED | OUTPATIENT
Start: 2018-01-23 | End: 2018-01-23 | Stop reason: HOSPADM

## 2018-01-23 RX ORDER — HYDROMORPHONE HYDROCHLORIDE 1 MG/ML
0.5 INJECTION, SOLUTION INTRAMUSCULAR; INTRAVENOUS; SUBCUTANEOUS
Status: DISCONTINUED | OUTPATIENT
Start: 2018-01-23 | End: 2018-01-23 | Stop reason: HOSPADM

## 2018-01-23 RX ORDER — LORAZEPAM 2 MG/ML
1-2 INJECTION INTRAMUSCULAR EVERY 30 MIN PRN
Status: DISCONTINUED | OUTPATIENT
Start: 2018-01-23 | End: 2018-01-23 | Stop reason: HOSPADM

## 2018-01-23 RX ADMIN — SODIUM CHLORIDE: 9 INJECTION, SOLUTION INTRAVENOUS at 06:56

## 2018-01-23 RX ADMIN — INSULIN ASPART 1 UNITS: 100 INJECTION, SOLUTION INTRAVENOUS; SUBCUTANEOUS at 08:10

## 2018-01-23 RX ADMIN — FOLIC ACID: 5 INJECTION, SOLUTION INTRAMUSCULAR; INTRAVENOUS; SUBCUTANEOUS at 08:09

## 2018-01-23 RX ADMIN — TAMSULOSIN HYDROCHLORIDE 0.4 MG: 0.4 CAPSULE ORAL at 08:10

## 2018-01-23 RX ADMIN — SODIUM CHLORIDE 60 ML: 9 INJECTION, SOLUTION INTRAVENOUS at 00:08

## 2018-01-23 RX ADMIN — LISINOPRIL 10 MG: 10 TABLET ORAL at 08:10

## 2018-01-23 RX ADMIN — METOPROLOL SUCCINATE 50 MG: 50 TABLET, EXTENDED RELEASE ORAL at 08:10

## 2018-01-23 RX ADMIN — GABAPENTIN 300 MG: 300 CAPSULE ORAL at 06:55

## 2018-01-23 RX ADMIN — IOPAMIDOL 100 ML: 755 INJECTION, SOLUTION INTRAVENOUS at 00:08

## 2018-01-23 NOTE — ED NOTES
Patient presents with same midline to left upper quadrant pain that he had on January 1st  Patient has increase of pain after drinking  2 16 oz beers  Patient states this pain identical as before.  Patient is on Xaraultia for Atrial Fib. Heart rates is 129 denies having any chest pain. 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

## 2018-01-23 NOTE — PHARMACY - DISCHARGE MEDICATION RECONCILIATION
Discharge medication review for this patient is complete. Pharmacist assisted with medication reconciliation of discharge medications with prior to admission medications.     The following changes were made to the discharge medication list based on pharmacist review:  Added:  NA  Discontinued: NA  Changed: NA      Patient's Discharge Medication List  - medications as listed on After Visit Summary (AVS)     Review of your medicines      CONTINUE these medicines which may have CHANGED, or have new prescriptions. If we are uncertain of the size of tablets/capsules you have at home, strength may be listed as something that might have changed.       Dose / Directions    gabapentin 300 MG capsule   Commonly known as:  NEURONTIN   This may have changed:    - how much to take  - how to take this  - when to take this  - additional instructions   Used for:  Type 2 diabetes mellitus with diabetic polyneuropathy, without long-term current use of insulin (H)        Take 1 tablet (300 mg) every night for 3 days, then if needed, go to two at HS   Quantity:  60 capsule   Refills:  3       metFORMIN 750 MG 24 hr tablet   Commonly known as:  GLUCOPHAGE-XR   This may have changed:  when to take this   Used for:  Type 2 diabetes mellitus with diabetic polyneuropathy, without long-term current use of insulin (H)        Dose:  1500 mg   Take 2 tablets (1,500 mg) by mouth daily   Quantity:  180 tablet   Refills:  3       metoprolol succinate 50 MG 24 hr tablet   Commonly known as:  TOPROL-XL   This may have changed:  when to take this   Used for:  Hypertension, benign essential, goal below 140/90        Dose:  50 mg   Take 1 tablet (50 mg) by mouth daily   Quantity:  90 tablet   Refills:  3       multivitamin, therapeutic with minerals Tabs tablet   This may have changed:  when to take this   Used for:  Surgery aftercare        Dose:  1 tablet   Take 1 tablet by mouth daily.   Quantity:  30 each   Refills:  1         CONTINUE these  medicines which have NOT CHANGED       Dose / Directions    glipiZIDE 2.5 MG 24 hr tablet   Commonly known as:  glipiZIDE XL   Used for:  Type 2 diabetes mellitus with diabetic polyneuropathy, without long-term current use of insulin (H)        Dose:  2.5 mg   Take 1 tablet (2.5 mg) by mouth every morning   Quantity:  90 tablet   Refills:  3       lisinopril 10 MG tablet   Commonly known as:  PRINIVIL/ZESTRIL   Used for:  Persistent proteinuria        Dose:  10 mg   Take 1 tablet (10 mg) by mouth daily   Quantity:  90 tablet   Refills:  1       rivaroxaban ANTICOAGULANT 20 MG Tabs tablet   Commonly known as:  XARELTO   Used for:  Atrial fibrillation with RVR (H)        Dose:  20 mg   Take 1 tablet (20 mg) by mouth daily (with dinner)   Quantity:  30 tablet   Refills:  5       tamsulosin 0.4 MG capsule   Commonly known as:  FLOMAX   Used for:  Urgency of urination, Hypertrophy of prostate without urinary obstruction        Dose:  0.4 mg   Take 1 capsule (0.4 mg) by mouth daily   Quantity:  90 capsule   Refills:  3

## 2018-01-23 NOTE — PROGRESS NOTES
[place] [facility] ADMISSION NOTE    Patient admitted to room 1006 at approximately 0615via cart from emergency room. Patient was accompanied by transport tech.     Verbal SBAR report received from Jonatan LIRIANO RN prior to patient arrival.     Patient ambulated to bed with stand-by assist. Patient alert and oriented X 3. The patient is not having any pain. 0-10 Pain Scale: 7. Admission vital signs: Blood pressure 141/79, pulse 70, temperature 98.3  F (36.8  C), temperature source Oral, resp. rate 22, weight 90.7 kg (200 lb), SpO2 (!) 87 %. Patient was oriented to plan of care, call light, bed controls, tv, telephone, bathroom and visiting hours.     The following safety risks were identified during admission: none. Yellow risk band applied: NO. Recheck of O2 sat was 92% on RAFelicita Hankins

## 2018-01-23 NOTE — DISCHARGE SUMMARY
[facility]    Discharge Summary  Hospital Medicine    Date of Admission:  1/22/2018  Date of Discharge:  1/23/2018   Discharging Provider: Agnes Paris  Date of Service: 1/23/2018       Discharge Instructions:  Medical Instructions:  1.  ABSTAIN FROM ALL FUTURE ALCOHOL  2.  Please see your primary care provider within 7-10 days of discharge.  We would like for you to have your CBC (blood counts) checked at that point in time. In addition, we would like for her to monitor the small cyst in your pancreas and defer to her judgement as to whether or not GI consultation would be necessary.  3.  Return to the ED should you develop significant fever, chills, nausea, vomiting, black/bloody stools, or worsening abdominal pain.          Primary Care     Katie Gross  [address] [address]      Identification and Chief Complaint: Antoine Russo is a 72 year old male who presented on 1/22/2018 with complaint of epigastric/LUQ abdominal pain.    Discharge Diagnoses       Alcohol-induced acute pancreatitis without infection or necrosis    Hypertension, benign essential, goal below 140/90    Hypertrophy of prostate without urinary obstruction    CAD (coronary artery disease)    GERD (gastroesophageal reflux disease)    Hyperlipidemia LDL goal <100    Peripheral vascular disease (H)    H/O colectomy    Type 2 diabetes mellitus with diabetic polyneuropathy, without long-term current use of insulin (H)    Pancreatitis    Chronic atrial fibrillation (H)    Coronary artery disease    Discharge Disposition   Discharged to home    Discharge Orders     Reason for your hospital stay   pancreatitis     Follow-up and recommended labs and tests    Follow up with primary care provider, Katie Gross, within 7 days for hospital follow- up.  Please check CBC     Activity   Your activity upon discharge: activity as tolerated     Full Code     Diet   Follow this diet upon discharge: Orders  Placed This Encounter     Full Liquid Diet       Discharge Medications   Current Discharge Medication List      CONTINUE these medications which have NOT CHANGED    Details   tamsulosin (FLOMAX) 0.4 MG capsule Take 1 capsule (0.4 mg) by mouth daily  Qty: 90 capsule, Refills: 3    Associated Diagnoses: Urgency of urination; Hypertrophy of prostate without urinary obstruction      rivaroxaban ANTICOAGULANT (XARELTO) 20 MG TABS tablet Take 1 tablet (20 mg) by mouth daily (with dinner)  Qty: 30 tablet, Refills: 5    Associated Diagnoses: Atrial fibrillation with RVR (H)      lisinopril (PRINIVIL/ZESTRIL) 10 MG tablet Take 1 tablet (10 mg) by mouth daily  Qty: 90 tablet, Refills: 1    Associated Diagnoses: Persistent proteinuria      glipiZIDE (GLIPIZIDE XL) 2.5 MG 24 hr tablet Take 1 tablet (2.5 mg) by mouth every morning  Qty: 90 tablet, Refills: 3    Associated Diagnoses: Type 2 diabetes mellitus with diabetic polyneuropathy, without long-term current use of insulin (H)      metoprolol (TOPROL-XL) 50 MG 24 hr tablet Take 1 tablet (50 mg) by mouth daily  Qty: 90 tablet, Refills: 3    Associated Diagnoses: Hypertension, benign essential, goal below 140/90      gabapentin (NEURONTIN) 300 MG capsule Take 1 tablet (300 mg) every night for 3 days, then if needed, go to two at HS  Qty: 60 capsule, Refills: 3    Associated Diagnoses: Type 2 diabetes mellitus with diabetic polyneuropathy, without long-term current use of insulin (H)      metFORMIN (GLUCOPHAGE-XR) 750 MG 24 hr tablet Take 2 tablets (1,500 mg) by mouth daily  Qty: 180 tablet, Refills: 3    Associated Diagnoses: Type 2 diabetes mellitus with diabetic polyneuropathy, without long-term current use of insulin (H)      multivitamin, therapeutic with minerals (THERA-VIT-M) TABS Take 1 tablet by mouth daily.  Qty: 30 each, Refills: 1    Associated Diagnoses: Surgery aftercare           Allergies   Allergies   Allergen Reactions     Nkda [No Known Drug Allergies]         Consultations This Hospital Stay  None    Significant Results and Procedures   Procedures    None    Data   Results for orders placed or performed during the hospital encounter of 01/22/18   CT Abdomen Pelvis w Contrast    Narrative    CT ABDOMEN PELVIS W CONTRAST  1/23/2018 12:20 AM     HISTORY: Abdominal pain, suspect pancreatitis. Surgically altered  anatomy.     TECHNIQUE: Volumetric acquisition through abdomen and pelvis with IV  contrast. 100 mL Isovue-370. Radiation dose for this scan was reduced  using automated exposure control, adjustment of the mA and/or kV  according to patient size, or iterative reconstruction technique.    COMPARISON: 1/2/2018.    FINDINGS: Postoperative changes of splenectomy and distal  pancreatectomy. Again seen is moderate fluid and stranding in the  upper abdomen which is most prominent about the pancreas and slightly  increased. Pancreas appears mildly edematous. No focal areas of  pancreatic necrosis. Mild adjacent gastric wall thickening is slightly  less prominent. Stable small 1.5 cm fluid pocket along the resection  margin in the pancreatic body. No new loculated fluid collections.    The other upper abdominal organs are stable and demonstrate no  worrisome findings. Bilateral renal cysts.    Pelvic structures within normal limits. Vascular calcification. No  abdominal aortic aneurysm. No bowel obstruction, free air or other  acute findings.      Impression    IMPRESSION:  1. Moderate fluid and stranding in the upper abdomen has increased and  is likely due to pancreatitis.  2. 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  3. Small 1.5 cm fluid pocket along the resection margin in the  pancreatic body is stable and may be a small pseudocyst. No new  loculated fluid collections.  4. Mild gastric wall thickening probably relates to the pancreatitis  and is slightly less prominent.    FANG DIALLO MD       History of Present Illness   (From  "H&P) See H and P as admission and discharge are same day.    Hospital Course   Antoine Russo was admitted on 1/22/2018.  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  Continued to drink post discharge. Presents with epigastric/LUQ pain; onset the afternoon of 01/22 after consumption of two beers.  Similar to above previous episode of pancreatitis. VS revealed mild tachycardia, mild hypertension. CMP was grossly normal.  Lipase was 942.  WBC was 17.5. CT abd/pelvis showed: \"Moderate fluid and stranding in the upper abdomen has increased and is likely due to pancreatitis. Small 1.5 cm fluid pocket along the resection margin in the pancreatic body is stable and may be a small pseudocyst. Mild gastric wall thickening probably relates to the pancreatitis and is slightly less prominent.\"  On morning of admission, the patient's lipase had normalized and he was relatively pain free.  He was hungry and as such, started on clear liquid diet. He progressed to the point at which he ate a sandwich and was pain free 1.5 hours after consumption.  As such, patient safe to discharge home.  He was discharged with instructions to abstain entirely from alcohol.  He will follow up with his primary care provider in 7-10 days to discuss his hospital course, continued monitoring of possible pseudocyst, and to recheck his WBC.     Alcohol Dependence  Reports drinking between 32-48 oz of beer daily. On admission, slight HTN and tachycardia. Not tremulous.  Place on CIWA protocol and given IV multivitamins.  Did not show evidence of withdrawal during admission.  Told that he MUST abstain " entirely from alcohol moving forward; patient expresses understanding.     Leukocytosis  Hx of Splenectomy  WBC 17 on arrival.  Thought to be stress induced in the setting of splenectomy, however in the presence of pseudocyst this will need to be monitored closely.  No evidence of peritoneal signs on physical exam.  On morning of admission, WBC 15. The patient will follow up with his outpatient provider within 7-10 days of discharge to have his CBC rechecked.     Hypertension, benign essential, goal below 140/90  BP reviewed, slightly elevated on admission, but better controled with PTA lisinopril, metoprolol.  Will continue these on discharge.     CAD (coronary artery disease)  Peripheral vascular disease (H)  Stress testing 2009 showed inferior wall ischemia.  Cath preformed; identified moderate CAD with stenosis of 40-50% in LAD and RCA.  No stents were placed.  Echo in 01/2018 (done while in Afib with rates of 100-110); EF of 55-60%.  No RWMA.     Atrial Fibrillation  EKG on admission shows Afib.  Rate slightly elevated, however not true RVR.  Rates better controlled with pain control.  The patient will continue his PTA xeralto on discharge.     GERD (gastroesophageal reflux disease)  Does not use medications as an outpatient.  Received 1 dose of IV pantoprazole while admitted for GI protection.  No acute indication to continue this on discharge.      Hyperlipidemia LDL goal <100  Not on medication as an outpatient        Type 2 diabetes mellitus with diabetic polyneuropathy, without long-term current use of insulin (H)  Home metformin/glipizide were held given pancreatitis.  High sliding scale insulin using the NPO protocol was in place while the patient wad admitted.  However quickly recovered as above.  The patient will resume his PTA metformin/glipizide on discharge.     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  S/p resection with Dr. Gerson Ravi in 2012.  Will continue outpatient follow up on discharge.    Pending Results   Unresulted Labs Ordered in the Past 30 Days of this Admission     No orders found for last 61 day(s).          Physical Exam   Temp:  [97.9  F (36.6  C)-98.3  F (36.8  C)] 97.9  F (36.6  C)  Pulse:  [70] 70  Heart Rate:  [105-117] 105  Resp:  [20-22] 22  BP: (129-167)/(60-94) 141/79  SpO2:  [87 %-99 %] 87 %  Vitals:    01/22/18 2133   Weight: 90.7 kg (200 lb)       Physical Exam: Please see H and P.  Exam unchanged and admission/discharge are same day.      The discharge plan was discussed with the patient and patient agrees to plan.    Total time on this discharge was greater than 30 minutes.    Agnes Paris PA-C  [facility]

## 2018-01-23 NOTE — PROGRESS NOTES
[facility] [facility] DISCHARGE NOTE    Patient discharged to home at 4:14 PM via ambulation. Accompanied by spouse and staff. Discharge instructions reviewed with patient and spouse, opportunity offered to ask questions. Prescriptions - None ordered for discharge. All belongings sent with patient.    Reba Mejias

## 2018-01-23 NOTE — PROGRESS NOTES
Patient has  Fence to Observation  order. Patient has been given Patient Bill of Rights, Observation brochure and  What does Observation mean to me  forms.  Patient has been given the opportunity to ask questions about observation status and their plan of care.       SW provided pt with behavioral health instructions in DC instructions.     Wilma LOPEZ, [facility], [facility] [phone]

## 2018-01-23 NOTE — PROGRESS NOTES
Patient resting in bed. VSS. No complaints of pain or distress. IV fluids infusing. No s/sx of alcohol withdrawal. Tolerated 1 cup of broth, will cont to monitor.

## 2018-01-23 NOTE — PROGRESS NOTES
Patient continues to have no complaints of pain or discomfort. No nausea/vomitting. At this point has tolerated beef broth, tomato soup, coffee, water. Wife at bedside inquiring about discharge home today. Patient states he feels he would be ready. Obs goals have been met. ROSELYN Alarcon, paged.

## 2018-01-23 NOTE — ED PROVIDER NOTES
"  History     Chief Complaint   Patient presents with     Abdominal Pain     \" pancreatitis\" Hx of this in the past, last NICOLÁS. ETOH, 2-3 per day since NICOLÁS. Prior to that was 4-5 daily. LUQ pain now.      HPI  Antoine Russo is a 72 year old male, past medical history is significant for alcohol-induced acute pancreatitis on 1/11/18, ASCVD, type 2 diabetes, history of hemicolectomy, splenectomy, atrial fibrillation, peripheral vascular disease, colitis, GERD, diverticulitis, hypertension, BPH, impotence, presents to the emergency department with concerns of abdominal pain epigastric/left upper quadrant beginning shortly after drinking 2-16 ounce beer this afternoon.  The patient states that the pain is identical to when he had pancreatitis back at the beginning of January also after drinking alcohol.  Since that time he has not quit drinking but has decreased the amount of alcohol he consumes.  He admits to being \"stupid\" with his use of alcohol today.  He notes no fever chills or sweats.  His bowel movement since his hemicolectomy have always been soft and never formed he has had a bowel movement before onset of pain today as well as after.  The pain is described as dull aching and also burning primarily left upper quadrant.  This does not feel like anything else he is experienced in the past to him.  He notes nausea but no vomiting.  He has taken no pain medication for this.  He requests pain medication.    Problem List:    Patient Active Problem List    Diagnosis Date Noted     Anticipated difficulty with intubation 05/23/2017     Priority: High     Class: Chronic     Difficult two hands mask ventilation, intubated multiple times asleep with video laryngoscope. H/o tongue cancer surgery.        Alcohol-induced acute pancreatitis without infection or necrosis 01/11/2018     Priority: Medium     Spleen absent 10/10/2017     Priority: Medium     Coronary artery disease      Priority: Medium     Type 2 diabetes " mellitus with diabetic polyneuropathy, without long-term current use of insulin (H) 04/04/2017     Priority: Medium     Left varicocele 04/04/2017     Priority: Medium     Pancreatic duct leak 05/03/2016     Priority: Medium     Dehydration 03/30/2016     Priority: Medium     IPMN (intraductal papillary mucinous neoplasm) 03/10/2016     Priority: Medium     Type 2 diabetes mellitus with diabetic polyneuropathy (H) 10/21/2015     Priority: Medium     H/O colectomy 08/08/2013     Priority: Medium     Health Care Home 06/14/2013     Priority: Medium     EMERGENCY CARE PLAN  June 14, 2013: No current Care Coordination follow up planned. Please refer if Care Coordination services are needed.    Presenting Problem Signs and Symptoms Treatment Plan   Questions or concerns   during clinic hours   I will call my clinic directly:  [address] [address] [phone].   Questions or concerns outside clinic hours   I will call the 24 hour nurse line at   [phone] or [facility].   Need to schedule an appointment   I will call the 24 hour scheduling team at [phone] or my clinic directly at [phone].   Same day treatment     I will call my clinic first, nurse line if after hours, urgent care and express care if needed.   Clinic care coordination services (regular clinic hours)   I will call a clinic care coordinator directly:     Shelia Emanuel RN [facility]  [phone]    Brianna Cristobal SW:    [phone]    Or call my clinic at [phone] and ask to speak with care coordination.   Crisis Services: Behavioral or Mental Health  [facility] 24 Hour Phone Line  [phone]    [facility] 24 Hour Crisis Services  [phone]    [facility] (Behavioral Health Providers) Network [phone]    [facility]   [phone]     Emergency treatment -- Immediately    [facility]All 911                Protein-calorie malnutrition (H) 12/27/2012     Priority:  Medium     Problem list name updated by automated process. Provider to review       Atrial fibrillation with RVR (H) 12/18/2012     Priority: Medium     Clostridium difficile enterocolitis 12/18/2012     Priority: Medium     Groin fluid collection 12/15/2012     Priority: Medium     CT 12/12- New (since 5/11) collection measuring at least 2.3 cm in diameter and 6.5 cm in length within the right inguinal canal. Bilateral inguinal surgical clips are noted       Colitis 12/13/2012     Priority: Medium     Fecal transplant 12/17/12       Peripheral vascular disease (H) 11/01/2012     Priority: Medium     Problem list name updated by automated process. 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 Gerson Ravi and aristides at the  in 2012  NAD 2017  CT scan of the neck every 2-3 years.  - Thyroid labs yearly.  - Carotid ultrasound in three to four years to evaluate for stenosis.       Diabetic neuropathy, painful (H) 03/29/2012     Priority: Medium     Colon polyp 08/26/2011     Priority: Medium     Colonoscopy 8/2011-A sessile polyp was found in the cecum. The polyp was 6 mm in size. The polyp was removed with a hot snare. Resection and retrieval were complete. A sessile polyp was found in the proximal transverse colon. The polyp was 15 mm in size. The polyp was removed with a hot snare. Resection and retrieval were complete. A sessile polyp was found in the sigmoid colon. The polyp was 5 mm in size       Advanced directives, counseling/discussion 06/03/2011     Priority: Medium     Type 2 diabetes, HbA1C goal < 8% (H) 10/31/2010     Priority: Medium     3/2009 diagnosed       HYPERLIPIDEMIA LDL GOAL <100 10/31/2010     Priority: Medium     CAD (coronary artery disease) 05/26/2009     Priority: Medium     Angiogram  5/09       1. Moderate coronary artery disease with stenosis of 40-50% in both     the LAD and RCA.     2.  Normal LV function.     3.  Normal hemodynamics.     4.  Normal WaveWire across the RCA indicating that this vessel's     lesion is not flow-limiting.       GERD (gastroesophageal reflux disease) 05/26/2009     Priority: Medium     Hypertrophy of breast 09/25/2007     Priority: Medium     Diverticulitis of colon 07/17/2007     Priority: Medium     Colitis on CT Scan 5/2011- [place]  Colonoscopy 8/2011 Diverticulitis - Multiple small and large-mouthed diverticula were found in the mid sigmoid colon and at the hepatic flexure. There was narrowing of the colon in association with the diverticular opening. Nena-diverticular erythema was seen. There was evidence of an impacted diverticulum. Purulent discharge was seen in association with the diverticlar opening. Biopsies were taken with a cold forceps for histology. Multiple small and large-mouthed diverticula were found in the sigmoid colon, in the descending colon, in the transverse colon and in the ascending colon.   Hospitalized diverticulitis 12/12  Problem list name updated by automated process. Provider to review       PERS HX TOBACCO USE - quit in 11/06 with chantix 03/15/2007     Priority: Medium     Hypertension, benign essential, goal below 140/90 11/07/2005     Priority: Medium     Patient has only fair bp control and with family history of diabetes will all ace if lab indicated also has bph and may op for psa and not digital exam next yr        Pain in joint, shoulder region 11/07/2005     Priority: Medium     Hypertrophy of prostate without urinary obstruction 11/07/2005     Priority: Medium     Problem list name updated by automated process. 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 difficile colitis      Colon polyp      Coronary  artery disease      Diabetes mellitus (H)      Diverticulitis      Hypertension      Malignant neoplasm (H)      Noninfectious ileitis        Past Surgical History:    Past Surgical History:   Procedure Laterality Date     BREAST SURGERY  2008    right breast mass benign     COLONOSCOPY      multiple polyps removed     COLONOSCOPY  8/24/2011    Procedure:COMBINED COLONOSCOPY, REMOVE TUMOR/POLYP/LESION BY SNARE; Surgeon:MILEY ARBOLEDA; Location:[facility]     COLONOSCOPY  12/17/2012    Procedure: COLONOSCOPY;;  Surgeon: Leon Maurer MD;  Location:  GI     COLONOSCOPY  12/18/2012    Procedure: COLONOSCOPY;;  Surgeon: Leon Maurer MD;  [facility]     DENERVATION OF SPERMATIC CORD MICROSURGICAL Left 5/23/2017    Procedure: DENERVATION OF SPERMATIC CORD MICROSURGICAL;;  Surgeon: Marcio Aggarwal MD;  [facility]     DISSECTION RADICAL NECK BILATERAL  8/2/2012    Procedure: DISSECTION RADICAL NECK BILATERAL;;  Surgeon: Yung Alvares MD;  [facility]     ENDOSCOPIC RETROGRADE CHOLANGIOPANCREATOGRAM N/A 5/10/2016    Procedure: COMBINED ENDOSCOPIC RETROGRADE CHOLANGIOPANCREATOGRAPHY, PLACE TUBE/STENT;  Surgeon: Yovanny Beasley MD;  [facility]     ENDOSCOPIC ULTRASOUND UPPER GASTROINTESTINAL TRACT (GI) N/A 2/3/2016    Procedure: ENDOSCOPIC ULTRASOUND, ESOPHAGOSCOPY / UPPER GASTROINTESTINAL TRACT (GI);  Surgeon: Grabiel Plata MD;  [facility]     ESOPHAGOSCOPY, GASTROSCOPY, DUODENOSCOPY (EGD), COMBINED N/A 2/3/2016    Procedure: COMBINED ENDOSCOPIC ULTRASOUND, ESOPHAGOSCOPY, GASTROSCOPY, DUODENOSCOPY (EGD), FINE NEEDLE ASPIRATE/BIOPSY;  Surgeon: Grabiel Plata MD;  [facility]     ESOPHAGOSCOPY, GASTROSCOPY, DUODENOSCOPY (EGD), COMBINED N/A 6/8/2016    Procedure: COMBINED ESOPHAGOSCOPY, GASTROSCOPY, DUODENOSCOPY (EGD), REMOVE FOREIGN BODY;  Surgeon: Yovanny Beasley MD;  Location:  GI     EXCISE LESION INTRAORAL  6/14/2012    Procedure: EXCISE  LESION INTRAORAL;  Wide Local Excision Floor of Mouth, Direct Laryngoscopy, Bilateral Chester's Marsuplization, Split Thickness Skin Graft from right Thigh  Latex Safe;  Surgeon: Gerson Ravi MD;  [facility]     EXCISE LESION INTRAORAL  8/2/2012    Procedure: EXCISE LESION INTRAORAL;  Floor of Mouth Resection, Bilateral Selective Radical Neck Dissection, Tracheostomy, Left Radial Forearm  Free Flap with Alloderm, Nasogastric Feeding Tube Placement,    * Latex Safe*;  Surgeon: Gerson Ravi MD;  [facility]     EXCISE LESION INTRAORAL  12/11/2012    Procedure: EXCISE LESION INTRAORAL;  takedown of oral flap;  Surgeon: Yung Alvares MD;  [facility]     GRAFT FREE VASCULARIZED (LOCATION)  8/2/2012    Procedure: GRAFT FREE VASCULARIZED (LOCATION);;  Surgeon: Yung Alvares MD;  [facility]     GRAFT SKIN SPLIT THICKNESS FROM EXTREMITY  6/14/2012    Procedure: GRAFT SKIN SPLIT THICKNESS FROM EXTREMITY;;  Surgeon: Gerson Ravi MD;  [facility]     LAPAROSCOPIC ILEOSTOMY TAKEDOWN  6/6/2013    Procedure: LAPAROSCOPIC ILEOSTOMY TAKEDOWN;  Laparoscopic Closure of Enterostomy, Guerda's Type with IleoRectal Anastomosis ;  Surgeon: Grabiel Riddle MD;  [facility]     LAPAROTOMY EXPLORATORY  12/20/2012    Procedure: LAPAROTOMY EXPLORATORY;  Exploratory Laparotomy, total abdominal colectomy, ileostomy formation;  Surgeon: Miquel Cannon MD;  [facility]     LARYNGOSCOPY  6/14/2012    Procedure: LARYNGOSCOPY;;  Surgeon: Gerson Ravi MD;  [facility]     ORTHOPEDIC SURGERY      ganglian cyst left ankle     PANCREATECTOMY, SPLENECTOMY N/A 3/10/2016    Procedure: PANCREATECTOMY, SPLENECTOMY;  Surgeon: Nael Abel MD;  [facility]     SHOULDER SURGERY  2006, 2008    2006- right rotator cuff, 2008 bone spur on left. Dr. Hdez     VARICOCELECTOMY Left 5/23/2017    Procedure: VARICOCELECTOMY;  Left Varicocele Repair, Denervation of Left Testis;  Surgeon: Marcio Aggarwal,  MD;  [facility]       Family History:    Family History   Problem Relation Age of Onset     DIABETES Sister      onset age 50     Alzheimer Disease Mother       80     Alzheimer Disease Father       85     DIABETES Other      nephew type 1     DIABETES Other      Anesthesia Reaction No family hx of      Colon Cancer No family hx of      Colon Polyps No family hx of      Crohn Disease No family hx of      Ulcerative Colitis No family hx of        Social History:  Marital Status:   [2]  Social History   Substance Use Topics     Smoking status: Former Smoker     Packs/day: 1.00     Years: 40.00     Types: Cigarettes     Quit date: 2006     Smokeless tobacco: Never Used     Alcohol use Yes      Comment: 6-12 beers/daily for at least 40 years; now 3-4 beers daily now        Medications:      tamsulosin (FLOMAX) 0.4 MG capsule   rivaroxaban ANTICOAGULANT (XARELTO) 20 MG TABS tablet   lisinopril (PRINIVIL/ZESTRIL) 10 MG tablet   glipiZIDE (GLIPIZIDE XL) 2.5 MG 24 hr tablet   metoprolol (TOPROL-XL) 50 MG 24 hr tablet   gabapentin (NEURONTIN) 300 MG capsule   metFORMIN (GLUCOPHAGE-XR) 750 MG 24 hr tablet   multivitamin, therapeutic with minerals (THERA-VIT-M) TABS         Review of Systems   All other systems reviewed and are negative.      Physical Exam   BP: 153/79  Pulse: 70  Heart Rate: 117  Temp: 98.3  F (36.8  C)  Resp: 22  Weight: 90.7 kg (200 lb)  SpO2: 95 %      Physical Exam   Constitutional: He is oriented to person, place, and time. He appears well-developed and well-nourished.   HENT:   Head: Normocephalic and atraumatic.   Right Ear: External ear normal.   Left Ear: External ear normal.   Nose: Nose normal.   Mouth/Throat: Oropharynx is clear and moist.   Eyes: Conjunctivae and EOM are normal. Pupils are equal, round, and reactive to light.   Neck: Normal range of motion. Neck supple.   Cardiovascular: Normal rate, regular rhythm, normal heart sounds and intact distal pulses.     Pulmonary/Chest: Effort normal and breath sounds normal.   Abdominal: Soft. Bowel sounds are normal. There is tenderness.       Musculoskeletal: Normal range of motion.   Neurological: He is alert and oriented to person, place, and time.   Skin: Skin is warm and dry.   Psychiatric: He has a normal mood and affect. His behavior is normal.   Nursing note and vitals reviewed.      ED Course     ED Course     Procedures  1:55 AM  Lab diagnostics and imaging reviewed with the patient and his significant other.  I recommended admission to hospital for n.p.o. status and IV fluids and pain control.           2:46 AM  Discussed with the on-call hospitalist Dr. JACOBO Finch, agrees to admit the patient to observation status transition orders to be placed by myself in the emergency department.    Critical Care time:  none               Labs Ordered and Resulted from Time of ED Arrival Up to the Time of Departure from the ED   CBC WITH PLATELETS DIFFERENTIAL - Abnormal; Notable for the following:        Result Value    WBC 17.5 (*)     RBC Count 4.32 (*)      (*)     MCH 33.1 (*)     Absolute Neutrophil 14.4 (*)     Absolute Monocytes 1.4 (*)     All other components within normal limits   COMPREHENSIVE METABOLIC PANEL - Abnormal; Notable for the following:     Glucose 172 (*)     Albumin 3.3 (*)     All other components within normal limits   LIPASE - Abnormal; Notable for the following:     Lipase 942 (*)     All other components within normal limits   ALCOHOL ETHYL   PERIPHERAL IV CATHETER       Assessments & Plan (with Medical Decision Making)   9-year-old male past medical history reviewed above, presentation in the emergency department with concerns of abdominal pain which is reminiscent of her recent experience of pancreatitis as discussed in the HPI.  Physical exam finds him tender as documented without unstable vital signs.  Leukocytosis and elevated lipase at 942.  I reviewed the patient's previous abdominal CT and  findings and I was concerned with his elevated white cell count for complications of pancreatitis and for that reason imaging was obtained and reviewed as above.  The patient may have early pseudocyst formation.  I plan to admit him to hospital for IV fluids, pain control, n.p.o. status and discussed him to that end with the hospitalist on call and place transition orders in the emergency department for him.  I have reviewed the nursing notes.    Disclaimer: This note consists of symbols derived from keyboarding, dictation and/or voice recognition software. As a result, there may be errors in the script that have gone undetected. Please consider this when interpreting information found in this chart.      I have reviewed the findings, diagnosis, plan and need for follow up with the patient.       New Prescriptions    No medications on file       Final diagnoses:   Alcohol-induced acute pancreatitis, unspecified complication status       1/22/2018   [facility] EMERGENCY DEPARTMENT     Willam Toure MD  01/23/18 [id number]

## 2018-01-23 NOTE — H&P
"[facility]    History and Physical  Hospital Medicine       Date of Admission:  1/22/2018  Date of Service: 1/23/2018     Primary Care Physician   Katie Gross [phone]    Assessment & Plan   Antoine Russo is a 72 year old male with PMH significant for CAD, chronic Afib, GERD, hx of pancreatic cancer s/p distal pancreatectomy and splenectomy, GERD, hx of partial colectomy for treatment of diverticulitis, HLD, HTN, BPH, PVD and T2DM who now presents with epigastric and LUQ abdominal pain.      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  Continued to drink post discharge. Presents with epigastric/LUQ pain since; onset the afternoon of 01/22 after consumption of two beers.  similar of previous episodes of pancreatitis. VS reveal mild tachycardia, mild hypertension. CMP grossly normal.  Lipase 942.  WBC 17.5. CT abd/pelvis shows: \"Moderate fluid and stranding in the upper abdomen has increased and is likely due to pancreatitis. Small 1.5 cm fluid pocket along the resection margin in the pancreatic body is stable and may be a small pseudocyst. Mild gastric wall thickening probably relates to the pancreatitis and is slightly less prominent.\"  This morning, reports pain is resolved; feels hungry.  - clear liquid trial with IVF  - pain control with IV dilaudid  - consider re-imaging given possible pseudocyst    Alcohol Dependence  Reports drinking between 32-48oz of beer daily. On admission, slight HTN and tachycardia. Not tremulous  - CIWA protocol in place  - thiamine, folic acid, multivitamin, magnesium/phosphorus therapy initiated. (IV given pancreatitis)  - initiate lorazepam therapy, PRN    Leukocytosis  Hx of Splenectomy  WBC 17 on " arrival.  May be stress induced in the setting of splenectomy, however in the presence of pseudocyst this will need to be monitored closely.  No evidence of peritoneal signs on physical exam.  This morning, WBC 15.  - CBC in AM    Hypertension, benign essential, goal below 140/90  BP reviewed, slightly elevated  - continue PTA lisinopril, metoprolol    CAD (coronary artery disease)  Peripheral vascular disease (H)  Stress testing 2009 showed inferior wall ischemic.  Cath preformed; identified moderate CAD with stenosis of 40-50% in LAD and RCA.  No stents were placed.  Echo in 01/2018 (done while in Afib with rates of 100-110); EF of 55-60%.  No RWMA.    Atrial Fibrillation  EKG on admission shows Afib.  Rate slightly elevated, however not true RVR  - continue PTA xeralto    GERD (gastroesophageal reflux disease)  Does not use medications as an outpatient  - will start once daily IV pantoprazole for GI protection while admitted      Hyperlipidemia LDL goal <100  Not on medication as an outpatient        Type 2 diabetes mellitus with diabetic polyneuropathy, without long-term current use of insulin (H)  - hold PTA metformin/glipizide given above pancreatitis, NPO status, possible EtOH withdrawal  - high SSI ordered with hypo/hyperglycemia protocol; will use NPO protocol    H/O Hemicolectomy due to megacolon as a result of C.diff colitis    Hx of C.diff colitis s/p fecal transplant    Hx of squamous cell carcinoma of the mouth  Follows at the [facility].  S/p resection with Dr. Gerson Ravi in 2012         F: 125cc/hr of 0.9NS  E: BMP now  N: NPO  DVT Prophylaxis: Xeralto    Code Status: Full Code    Disposition: Anticipate discharge in 1-2 days. Appropriate for observation care. Should he fail clear liquid diet, would require inpatient care for further bowel rest.    Case discussed with Dr. Jessica Finch.  Assessment and plan as written above.    Agnes Paris PA-C  [facility] Hospitalist Program    History is  obtained from the patient and review of the EMR.    Past Medical History    Past Medical History:   Diagnosis Date     C. 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     Noninfectious ileitis        Past Surgical History   Past Surgical History:   Procedure Laterality Date     BREAST SURGERY  2008    right breast mass benign     COLONOSCOPY      multiple polyps removed     COLONOSCOPY  8/24/2011    Procedure:COMBINED COLONOSCOPY, REMOVE TUMOR/POLYP/LESION BY SNARE; Surgeon:MILEY ARBOLEDA; [facility]     COLONOSCOPY  12/17/2012    Procedure: COLONOSCOPY;;  Surgeon: Leon Maurer MD;  [facility]     COLONOSCOPY  12/18/2012    Procedure: COLONOSCOPY;;  Surgeon: Leon Maurer MD;  [facility]     DENERVATION OF SPERMATIC CORD MICROSURGICAL Left 5/23/2017    Procedure: DENERVATION OF SPERMATIC CORD MICROSURGICAL;;  Surgeon: Marcio Aggarwal MD;  [facility]     DISSECTION RADICAL NECK BILATERAL  8/2/2012    Procedure: DISSECTION RADICAL NECK BILATERAL;;  Surgeon: Yung Alvares MD;  [facility]     ENDOSCOPIC RETROGRADE CHOLANGIOPANCREATOGRAM N/A 5/10/2016    Procedure: COMBINED ENDOSCOPIC RETROGRADE CHOLANGIOPANCREATOGRAPHY, PLACE TUBE/STENT;  Surgeon: Yovanny Beasley MD;  [facility]     ENDOSCOPIC ULTRASOUND UPPER GASTROINTESTINAL TRACT (GI) N/A 2/3/2016    Procedure: ENDOSCOPIC ULTRASOUND, ESOPHAGOSCOPY / UPPER GASTROINTESTINAL TRACT (GI);  Surgeon: Grabiel Plata MD;  [facility]     ESOPHAGOSCOPY, GASTROSCOPY, DUODENOSCOPY (EGD), COMBINED N/A 2/3/2016    Procedure: COMBINED ENDOSCOPIC ULTRASOUND, ESOPHAGOSCOPY, GASTROSCOPY, DUODENOSCOPY (EGD), FINE NEEDLE ASPIRATE/BIOPSY;  Surgeon: Grabiel Plata MD;  [facility]     ESOPHAGOSCOPY, GASTROSCOPY, DUODENOSCOPY (EGD), COMBINED N/A  6/8/2016    Procedure: COMBINED ESOPHAGOSCOPY, GASTROSCOPY, DUODENOSCOPY (EGD), REMOVE FOREIGN BODY;  Surgeon: Yovanny Beasley MD;  [facility]     EXCISE LESION INTRAORAL  6/14/2012    Procedure: EXCISE LESION INTRAORAL;  Wide Local Excision Floor of Mouth, Direct Laryngoscopy, Bilateral [place]'s Marsuplization, Split Thickness Skin Graft from right Thigh  Latex Safe;  Surgeon: Gerson Ravi MD;  [facility]     EXCISE LESION INTRAORAL  8/2/2012    Procedure: EXCISE LESION INTRAORAL;  Floor of Mouth Resection, Bilateral Selective Radical Neck Dissection, Tracheostomy, Left Radial Forearm  Free Flap with Alloderm, Nasogastric Feeding Tube Placement,    * Latex Safe*;  Surgeon: Gerson Ravi MD;  [facility]     EXCISE LESION INTRAORAL  12/11/2012    Procedure: EXCISE LESION INTRAORAL;  takedown of oral flap;  Surgeon: Yung Alvares MD;  [facility]     GRAFT FREE VASCULARIZED (LOCATION)  8/2/2012    Procedure: GRAFT FREE VASCULARIZED (LOCATION);;  Surgeon: Yung Alvares MD;  [facility]     GRAFT SKIN SPLIT THICKNESS FROM EXTREMITY  6/14/2012    Procedure: GRAFT SKIN SPLIT THICKNESS FROM EXTREMITY;;  Surgeon: Gerson Ravi MD;  [facility]     LAPAROSCOPIC ILEOSTOMY TAKEDOWN  6/6/2013    Procedure: LAPAROSCOPIC ILEOSTOMY TAKEDOWN;  Laparoscopic Closure of Enterostomy, Guerda's Type with IleoRectal Anastomosis ;  Surgeon: Grabiel Riddle MD;  [facility]     LAPAROTOMY EXPLORATORY  12/20/2012    Procedure: LAPAROTOMY EXPLORATORY;  Exploratory Laparotomy, total abdominal colectomy, ileostomy formation;  Surgeon: Miquel Cannon MD;  [facility]     LARYNGOSCOPY  6/14/2012    Procedure: LARYNGOSCOPY;;  Surgeon: Gerson Ravi MD;  [facility]     ORTHOPEDIC SURGERY      ganglian cyst left ankle     PANCREATECTOMY, SPLENECTOMY N/A 3/10/2016    Procedure: PANCREATECTOMY, SPLENECTOMY;  Surgeon: Nael Abel MD;  [facility]     SHOULDER SURGERY  2006,  "    2006- right rotator cuff, 2008 bone spur on left. Dr. Hdez     VARICOCELECTOMY Left 2017    Procedure: VARICOCELECTOMY;  Left Varicocele Repair, Denervation of Left Testis;  Surgeon: Marcio Aggarwal MD;  [facility]       Family History    Family History   Problem Relation Age of Onset     DIABETES Sister      onset age 50     Alzheimer Disease Mother       80     Alzheimer Disease Father       85     DIABETES Other      nephew type 1     DIABETES Other      Anesthesia Reaction No family hx of      Colon Cancer No family hx of      Colon Polyps No family hx of      Crohn Disease No family hx of      Ulcerative Colitis No family hx of        History of Present Illness   Antoine Russo is a 72 year old male with PMH significant for CAD, chronic Afib, GERD, hx of pancreatic cancer s/p distal pancreatectomy and splenectomy, GERD, hx of partial colectomy for treatment of diverticulitis, HLD, HTN, BPH, PVD and T2DM who now presents with epigastric and LUQ abdominal pain.    The patient reports that he drank 2, 16 oz. Light beers yesterday afternoon.  He finished these around 3:00pm.  At approximately 3:15pm, he began to note pain to his LUQ and epigastrium.  This quickly progressed to an excruciating level.  As such, he returned home.  He states that the pain was a 10/10.  Nothing seemed to make the pian better or worse.  The pain did not radiate.  There was no associated nausea, vomiting, fever, chills.  Denies recent black/blood stools, diarrhea/constipation. The patient reports that his wife \"made him\" come in given the severity of his pain.  He had a similar episode approximately 2 weeks ago for which [facility]    The satinet is s/p distal splenectomy for treatment of pancreatic cancer.  In addition, he had a prolonged alcohol history.  He previously drank 5-6 beers daily (16oz).  He states he \"cut back\" after his episode of pancreatitis in " early January to 1-2 beers daily.    ROS: Denies myalgias, cold-like symptoms (congestion, pharyngitis, sinus pressure, rhinorrhea), headaches, lightheadedness, dizziness, chest pain, palpitations/flutters, SOB, cough, wheezes, diarrhea/constipation, dysuria, hematuria, joint swelling, joint pain, leg swelling, and rashes.      Prior to Admission Medications   Prior to Admission Medications   Prescriptions Last Dose Informant Patient Reported? Taking?   gabapentin (NEURONTIN) 300 MG capsule Past Week at Unknown time  No Yes   Sig: Take 1 tablet (300 mg) every night for 3 days, then if needed, go to two at HS   glipiZIDE (GLIPIZIDE XL) 2.5 MG 24 hr tablet 1/22/2018 at Unknown time  No Yes   Sig: Take 1 tablet (2.5 mg) by mouth every morning   lisinopril (PRINIVIL/ZESTRIL) 10 MG tablet   No No   Sig: Take 1 tablet (10 mg) by mouth daily   metFORMIN (GLUCOPHAGE-XR) 750 MG 24 hr tablet   No No   Sig: Take 2 tablets (1,500 mg) by mouth daily   Patient taking differently: Take 1,500 mg by mouth every morning    metoprolol (TOPROL-XL) 50 MG 24 hr tablet   No No   Sig: Take 1 tablet (50 mg) by mouth daily   multivitamin, therapeutic with minerals (THERA-VIT-M) TABS  Self No No   Sig: Take 1 tablet by mouth daily.   Patient taking differently: Take 1 tablet by mouth every morning    rivaroxaban ANTICOAGULANT (XARELTO) 20 MG TABS tablet   No No   Sig: Take 1 tablet (20 mg) by mouth daily (with dinner)   tamsulosin (FLOMAX) 0.4 MG capsule   No No   Sig: Take 1 capsule (0.4 mg) by mouth daily      Facility-Administered Medications: None       Allergies   Allergies   Allergen Reactions     Nkda [No Known Drug Allergies]        Social History   Social History     Social History     Marital status:      Spouse name: N/A     Number of children: N/A     Years of education: N/A     Occupational History     [employer]      20 hr/week monday-Thur 7-noon     [employer]     RETIRED       Social History Main Topics     Smoking status: Former Smoker     Packs/day: 1.00     Years: 40.00     Types: Cigarettes     Quit date: 11/24/2006     Smokeless tobacco: Never Used     Alcohol use Yes      Comment: 6-12 beers/daily for at least 40 years; now 3-4 beers daily now     Drug use: No     Sexual activity: Yes     Partners: Female     Other Topics Concern      Service Yes     [employer] 6 years     Blood Transfusions No     Caffeine Concern Yes     0-2 cups     Occupational Exposure No     retired     Hobby Hazards No     Sleep Concern No     Stress Concern No     Weight Concern No     Special Diet Yes     healthy     Back Care No     Exercise Yes     Bike Helmet Not Asked     N/A     Seat Belt Yes     Self-Exams Yes     Parent/Sibling W/ Cabg, Mi Or Angioplasty Before 65f 55m? No     Social History Narrative    Son lives in [place] with 2 grandchildren 19 and 6       Physical Exam     /79  Pulse 70  Temp 98.3  F (36.8  C) (Oral)  Resp 22  Wt 90.7 kg (200 lb)  SpO2 (!) 87%  BMI 28.7 kg/m2     Weight: 200 lbs 0 oz Body mass index is 28.7 kg/(m^2).     Constitutional: Alert and oriented x4.  Cooperative.  Appears stated age.  Appears in no acute distress.   HEENT: Oropharynx is clear and moist. No evidence of cranial trauma.  Lymph/Hematologic: No occipital, submental, submandibular, anterior or posterior cervical, or supraclavicular lymphadenopathy is appreciated.  Cardiovascular: Irregularly irregular rate/ rhythm.  S1 and S2 grossly normal.  No appreciable murmur, rub, gallop. No lower extremity edema.  Respiratory: Clear to auscultation bilaterally. Equal chest expansion.  GI: Soft, normal bowel sounds, no hepatosplenomegaly. No rebound tenderness. No voluntary guarding.  Slight tenderness to LUQ with palpation.  Genitourinary: Deferred  Musculoskeletal: Normal muscle bulk and tone.  Skin: Warm and dry, no rashes.   Neurologic: Neck supple. Cranial nerves 3-12 are grossly intact.   is symmetric.     Data   Data reviewed today:     Recent Labs  Lab 01/22/18  2209   WBC 17.5*   HGB 14.3   *         POTASSIUM 4.1   CHLORIDE 100   CO2 23   BUN 11   CR 0.82   ANIONGAP 11   NILSON 9.2   *   ALBUMIN 3.3*   PROTTOTAL 8.1   BILITOTAL 1.0   ALKPHOS 56   ALT 27   AST 14   LIPASE 942*       Recent Results (from the past 24 hour(s))   CT Abdomen Pelvis w Contrast    Narrative    CT ABDOMEN PELVIS W CONTRAST  1/23/2018 12:20 AM     HISTORY: Abdominal pain, suspect pancreatitis. Surgically altered  anatomy.     TECHNIQUE: Volumetric acquisition through abdomen and pelvis with IV  contrast. 100 mL Isovue-370. Radiation dose for this scan was reduced  using automated exposure control, adjustment of the mA and/or kV  according to patient size, or iterative reconstruction technique.    COMPARISON: 1/2/2018.    FINDINGS: Postoperative changes of splenectomy and distal  pancreatectomy. Again seen is moderate fluid and stranding in the  upper abdomen which is most prominent about the pancreas and slightly  increased. Pancreas appears mildly edematous. No focal areas of  pancreatic necrosis. Mild adjacent gastric wall thickening is slightly  less prominent. Stable small 1.5 cm fluid pocket along the resection  margin in the pancreatic body. No new loculated fluid collections.    The other upper abdominal organs are stable and demonstrate no  worrisome findings. Bilateral renal cysts.    Pelvic structures within normal limits. Vascular calcification. No  abdominal aortic aneurysm. No bowel obstruction, free air or other  acute findings.      Impression    IMPRESSION:  1. Moderate fluid and stranding in the upper abdomen has increased and  is likely due to pancreatitis.  2. 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  3. Small 1.5 cm fluid pocket along the resection margin in the  pancreatic body is stable and may be a small pseudocyst. No new  loculated fluid  collections.  4. Mild gastric wall thickening probably relates to the pancreatitis  and is slightly less prominent.    FANG DIALLO MD       I personally reviewed the EKG tracing showing atrial fibrillation.    Agnes Paris [facility] Medicine

## 2018-01-23 NOTE — DISCHARGE INSTRUCTIONS
Medical Instructions:  1.  ABSTAIN FROM ALL FUTURE ALCOHOL  2.  Please see your primary care provider within 7-10 days of discharge.  We would like for you to have your CBC (blood counts) checked at that point in time. In addition, we would like for her to monitor the small cyst in your pancreas and defer to her judgement as to whether or not GI consultation would be neccessary.  3.  Return to the ED should you develop significant fever, chills, nausea, vomiting, black/bloody stools, or worsening abdominal pain.      Behavioral/Mental Health Resources  [place], [place], [place], [place], [place], [place], [place], [place] and [facility]    **Patient should check with their health insurance to identify providers in network**    Crisis Lines:   -[place]: [place] Crisis Connection: [phone]) [phone]/1-381.501.7320   -[place]: [phone]    -[place]/ [place]/ [place]/ [place]/ [place]: [phone]   -[facility]: [place] Crisis: [phone]   -[facility]: [facility] Crisis Team [phone] [phone] or 1-878.866.3408     Call the National Suicide Prevention Lifeline at 1-825-457-ZEGJ (9341) to be connected to a counselor at a crisis center in your area if you, a family member, or friend are experiencing   thoughts of suicide. The call is FREE, confidential, and always available.       National [place] on Mental Illness of [place] ([facility]) provides support groups and educational programs. Visit www.namihelps.org or call the [facility] Helpline at 1-894.192.5778  Or [phone] for further information.       Crisis Mobile Serivces:   -[place]: [place] [phone]   -[place]/ [place]/ [place]/ [place]/ [place]: [phone]   -[facility]: [place] [phone]   -[facility]: [phone] or [phone]    Chemical Dependency Detox:  - [place] [facility]:  [phone] - can ask for other recommendations  - [facility]  [facility]/[place]([facility]):  [phone]  - [facility] [facility]: [phone] [place]  - [facility] ([place]):  [phone]  - [facility] : [phone] [facility]  -  [place] ([facility]):  [phone]  - [place] ([facility]):  [phone]    Chemical Dependency Assessment:   - [place] Behavior[facility] Intake: [phone]   - Behavioral Health Providers, can help find a provider near you:  [phone]  - No insurance- call county of residence and ask about applying for a Rule 25    Counseling/psychotherapy:  - [facility] - [place],/[place]/ [facility] /other locations: [phone]  - Behavioral Healthcare Providers- Can help find a provider near you:  [phone]  - [facility]-[place]:  [phone]  - [facility]- [place]:  [phone]  - [facility]- [place]:  [phone]  - [facility]: [phone]  - [facility]- [place]/[facility]/other locations:  [phone]  - Family Based Therapy [facility]- [facility]/[place]:  [phone]  - [facility] - [place]:  [phone]  - [facility] - [place]:  [phone]  [facility]- [place]-based in [place]:  [phone]  - [phone]  - [facility]- [place]: [phone]  - [facility]- [facility]:  [phone]  - [facility] Counseling- [place] [phone]  - [facility] located in [place] (not affiliated with [place]): [phone]  - [facility]- [facility]:  [phone]/[place]: [phone] and other locations.  - [facility]- [place]: [phone]  - [facility]- [place]:  [phone]  - [facility]- [place]: [phone]  - Walk in counseling  [place] (Free counseling services)- [facility]: [phone]     Psychiatry/ Mental Health Medication management:  - [facility]- [place],/[place]/[facility] [place]/ [place]/ [facility] locations: [phone]  - Behavioral Healthcare Providers- Can help find a provider near you, if you have preferred one or [facility] they can identify who is in network and assist with scheduling an appointment:  [phone]  - [facility]: [place]/[facility] locations : [phone]  - [facility] - Dr Catrachito Vigil and other associates. Collaborative model  with PCP involvement:  [phone] (requires PCP referral specifically)  - [facility]- [place]:  [phone]  - [facility] Human Services: [place]:   [phone] (Requires individual to be engaged in counseling)  - [facility]- [place]: [phone] [facility]:  [phone]/[place]:  [phone]/ [place]:  [phone]  - [facility]- [place]:   [phone]  - [facility]- [place], -[phone]  - [facility] Psychiatry - Medical students who rotate yearly:  [phone]    County Numbers  - [facility]: [phone]  - [facility]: [phone]  - [place]: [phone]  - [facility] : [phone]  - [place]: [phone]  - [facility] County: [phone]  - [place]: [phone]  - [place]: [phone]  - [place]: [phone]  - [place]: [phone]    **Please note that this list does not include all agencies that provide services**    Care Transitions Team at [facility] [phone]

## 2018-01-24 ENCOUNTER — TELEPHONE (OUTPATIENT)
Dept: FAMILY MEDICINE | Facility: CLINIC | Age: 73
End: 2018-01-24

## 2018-01-24 NOTE — TELEPHONE ENCOUNTER
ED/UC/IP follow up phone call:  IP 01.23.2018    RN please call to follow up.  ETOH induced acute pancreatitis    Number of ED visits in past 12 months = 1    Yamila Massey CSS

## 2018-01-24 NOTE — TELEPHONE ENCOUNTER
"Hospital/TCU/ED for chronic condition Discharge Protocol    \"Hi, my name is Heidy Levy, a registered nurse, and I am calling from [facility].  I am calling to follow up and see how things are going for you after your recent emergency visit/hospital/TCU stay.\"    Tell me how you are doing now that you are home?\" Tired but feeling okay      Discharge Instructions    \"Let's review your discharge instructions.  What is/are the follow-up recommendations?  Pt. Response: see Dr. Gross next week    \"Has an appointment with your primary care provider been scheduled?\"   Yes. (confirm)    \"When you see the provider, I would recommend that you bring your medications with you.\"    Medications    \"Tell me what changed about your medicines when you discharged?\"    Changes to chronic meds?    0-1    \"What questions do you have about your medications?\"    None     New diagnoses of heart failure, COPD, diabetes, or MI?    No     On insulin: \"Did you start on insulin in the hospital or did you have your insulin dose changed?\"  No         Medication reconciliation completed? Yes  Was MTM referral placed (*Make sure to put transitions as reason for referral)?   No    Call Summary    \"What questions or concerns do you have about your recent visit and your follow-up care?\"     none    \"If you have questions or things don't continue to improve, we encourage you contact us through the main clinic number (give number).  Even if the clinic is not open, triage nurses are available 24/7 to help you.     We would like you to know that our clinic has extended hours (provide information).  We also have urgent care (provide details on closest location and hours/contact info)\"      \"Thank you for your time and take care!\"             "

## 2018-01-30 ENCOUNTER — OFFICE VISIT (OUTPATIENT)
Dept: CARDIOLOGY | Facility: CLINIC | Age: 73
End: 2018-01-30
Attending: FAMILY MEDICINE
Payer: COMMERCIAL

## 2018-01-30 ENCOUNTER — HOSPITAL ENCOUNTER (OUTPATIENT)
Dept: CARDIOLOGY | Facility: CLINIC | Age: 73
Discharge: HOME | End: 2018-01-30
Attending: INTERNAL MEDICINE | Admitting: INTERNAL MEDICINE
Payer: COMMERCIAL

## 2018-01-30 ENCOUNTER — OFFICE VISIT (OUTPATIENT)
Dept: FAMILY MEDICINE | Facility: CLINIC | Age: 73
End: 2018-01-30
Payer: COMMERCIAL

## 2018-01-30 VITALS
BODY MASS INDEX: 29.13 KG/M2 | DIASTOLIC BLOOD PRESSURE: 74 MMHG | HEART RATE: 64 BPM | OXYGEN SATURATION: 97 % | SYSTOLIC BLOOD PRESSURE: 147 MMHG | WEIGHT: 203 LBS

## 2018-01-30 VITALS
WEIGHT: 199 LBS | RESPIRATION RATE: 16 BRPM | TEMPERATURE: 98.1 F | SYSTOLIC BLOOD PRESSURE: 120 MMHG | HEART RATE: 73 BPM | BODY MASS INDEX: 28.55 KG/M2 | DIASTOLIC BLOOD PRESSURE: 80 MMHG | OXYGEN SATURATION: 93 %

## 2018-01-30 DIAGNOSIS — K86.2 PANCREATIC CYST: ICD-10-CM

## 2018-01-30 DIAGNOSIS — C04.0 MALIGNANT NEOPLASM OF ANTERIOR PORTION OF FLOOR OF MOUTH (H): Chronic | ICD-10-CM

## 2018-01-30 DIAGNOSIS — I48.0 PAROXYSMAL ATRIAL FIBRILLATION (H): ICD-10-CM

## 2018-01-30 DIAGNOSIS — I48.0 PAROXYSMAL ATRIAL FIBRILLATION (H): Primary | ICD-10-CM

## 2018-01-30 DIAGNOSIS — E11.42 TYPE 2 DIABETES MELLITUS WITH DIABETIC POLYNEUROPATHY, WITHOUT LONG-TERM CURRENT USE OF INSULIN (H): ICD-10-CM

## 2018-01-30 DIAGNOSIS — Z90.49 H/O COLECTOMY: Chronic | ICD-10-CM

## 2018-01-30 DIAGNOSIS — I10 HYPERTENSION, BENIGN ESSENTIAL, GOAL BELOW 140/90: Chronic | ICD-10-CM

## 2018-01-30 DIAGNOSIS — I48.20 CHRONIC ATRIAL FIBRILLATION (H): Chronic | ICD-10-CM

## 2018-01-30 DIAGNOSIS — K85.20 ALCOHOL-INDUCED ACUTE PANCREATITIS WITHOUT INFECTION OR NECROSIS: Primary | ICD-10-CM

## 2018-01-30 LAB
BASOPHILS # BLD AUTO: 0 10E9/L (ref 0–0.2)
BASOPHILS NFR BLD AUTO: 0.4 %
DIFFERENTIAL METHOD BLD: ABNORMAL
EOSINOPHIL # BLD AUTO: 0.2 10E9/L (ref 0–0.7)
EOSINOPHIL NFR BLD AUTO: 1.8 %
ERYTHROCYTE [DISTWIDTH] IN BLOOD BY AUTOMATED COUNT: 13.9 % (ref 10–15)
HCT VFR BLD AUTO: 42.5 % (ref 40–53)
HGB BLD-MCNC: 13.5 G/DL (ref 13.3–17.7)
LYMPHOCYTES # BLD AUTO: 3.1 10E9/L (ref 0.8–5.3)
LYMPHOCYTES NFR BLD AUTO: 28.4 %
MCH RBC QN AUTO: 33.5 PG (ref 26.5–33)
MCHC RBC AUTO-ENTMCNC: 31.8 G/DL (ref 31.5–36.5)
MCV RBC AUTO: 106 FL (ref 78–100)
MONOCYTES # BLD AUTO: 0.6 10E9/L (ref 0–1.3)
MONOCYTES NFR BLD AUTO: 5.3 %
NEUTROPHILS # BLD AUTO: 7 10E9/L (ref 1.6–8.3)
NEUTROPHILS NFR BLD AUTO: 64.1 %
PLATELET # BLD AUTO: 329 10E9/L (ref 150–450)
RBC # BLD AUTO: 4.03 10E12/L (ref 4.4–5.9)
WBC # BLD AUTO: 10.9 10E9/L (ref 4–11)

## 2018-01-30 PROCEDURE — 93005 ELECTROCARDIOGRAM TRACING: CPT

## 2018-01-30 PROCEDURE — 85025 COMPLETE CBC W/AUTO DIFF WBC: CPT | Performed by: FAMILY MEDICINE

## 2018-01-30 PROCEDURE — 99204 OFFICE O/P NEW MOD 45 MIN: CPT | Performed by: INTERNAL MEDICINE

## 2018-01-30 PROCEDURE — 36415 COLL VENOUS BLD VENIPUNCTURE: CPT | Performed by: FAMILY MEDICINE

## 2018-01-30 PROCEDURE — 99495 TRANSJ CARE MGMT MOD F2F 14D: CPT | Performed by: FAMILY MEDICINE

## 2018-01-30 PROCEDURE — 93010 ELECTROCARDIOGRAM REPORT: CPT | Performed by: INTERNAL MEDICINE

## 2018-01-30 RX ORDER — METFORMIN HCL 500 MG
1000 TABLET, EXTENDED RELEASE 24 HR ORAL 2 TIMES DAILY WITH MEALS
Qty: 360 TABLET | Refills: 1 | Status: SHIPPED | OUTPATIENT
Start: 2018-01-30 | End: 2018-09-16

## 2018-01-30 NOTE — NURSING NOTE
"Chief Complaint   Patient presents with     Hospital F/U     pancreatitis       Initial /80  Pulse 73  Temp 98.1  F (36.7  C) (Tympanic)  Resp 16  Wt 199 lb (90.3 kg)  SpO2 93%  BMI 28.55 kg/m2 Estimated body mass index is 28.55 kg/(m^2) as calculated from the following:    Height as of 1/1/18: 5' 10\" (1.778 m).    Weight as of this encounter: 199 lb (90.3 kg).  Medication Reconciliation: complete    Health Maintenance that is potentially due pending provider review:  NONE    n/a    Is there anyone who you would like to be able to receive your results? No  If yes have patient fill out VIDYA    "

## 2018-01-30 NOTE — MR AVS SNAPSHOT
After Visit Summary   1/30/2018    Antoine Russo    MRN: [id number]           Patient Information     Date Of Birth          1945        Visit Information        Provider Department      1/30/2018 3:00 PM Domingo Ashraf MD [facility]        Today's Diagnoses     Paroxysmal atrial fibrillation (H)    -  1       Follow-ups after your visit        Your next 10 appointments already scheduled     Jan 30, 2018  3:30 PM CST   ecg with [place] [facility]   [facility] ([facility])    [address] [address]   [phone]            Feb 01, 2018  7:40 AM CST   Return Visit with Emani Gallo PA-C   [facility]    [address] [address]   [phone]            Mar 30, 2018  8:00 AM CDT   Office Visit with Katie Gross MD   [facility] ([facility])    [address] [place] [address]   [phone]           Bring a current list of meds and any records pertaining to this visit. For Physicals, please bring immunization records and any forms needing to be filled out. Please arrive 10 minutes early to complete paperwork.              Future tests that were ordered for you today     Open Future Orders        Priority Expected Expires Ordered    EKG 12-LEAD CLINIC READ ([facility])- to be scheduled Routine 1/30/2018 1/30/2019 1/30/2018            Who to contact     If you have questions or need follow up information about today's clinic visit or your schedule please contact [facility] directly at [phone].  Normal or non-critical lab and imaging results will be communicated to you by MyChart, letter or phone within 4 business days after the clinic has received the results. If you do not hear from us within 7 days, please contact the  clinic through [employer] or phone. If you have a critical or abnormal lab result, we will notify you by phone as soon as possible.  Submit refill requests through [employer] or call your pharmacy and they will forward the refill request to us. Please allow 3 business days for your refill to be completed.          Additional Information About Your Visit        [employer] Information     [employer] gives you secure access to your electronic health record. If you see a primary care provider, you can also send messages to your care team and make appointments. If you have questions, please call your primary care clinic.  If you do not have a primary care provider, please call [phone] and they will assist you.        Care EveryWhere ID     This is your Care EveryWhere ID. This could be used by other organizations to access your [facility] medical records  [id number]        Your Vitals Were     Pulse Pulse Oximetry BMI (Body Mass Index)             64 97% 29.13 kg/m2          Blood Pressure from Last 3 Encounters:   01/30/18 147/74   01/30/18 120/80   01/23/18 104/70    Weight from Last 3 Encounters:   01/30/18 92.1 kg (203 lb)   01/30/18 90.3 kg (199 lb)   01/22/18 90.7 kg (200 lb)                 Today's Medication Changes          These changes are accurate as of 1/30/18  3:11 PM.  If you have any questions, ask your nurse or doctor.               These medicines have changed or have updated prescriptions.        Dose/Directions    gabapentin 300 MG capsule   Commonly known as:  NEURONTIN   This may have changed:    - how much to take  - how to take this  - when to take this  - additional instructions   Used for:  Type 2 diabetes mellitus with diabetic polyneuropathy, without long-term current use of insulin (H)        Take 1 tablet (300 mg) every night for 3 days, then if needed, go to two at HS   Quantity:  60 capsule   Refills:  3       metFORMIN 500 MG 24 hr tablet   Commonly known as:  GLUCOPHAGE-XR   This may have  changed:    - medication strength  - how much to take  - when to take this   Used for:  Type 2 diabetes mellitus with diabetic polyneuropathy, without long-term current use of insulin (H)   Changed by:  Katie Gross MD        Dose:  1000 mg   Take 2 tablets (1,000 mg) by mouth 2 times daily (with meals)   Quantity:  360 tablet   Refills:  1       metoprolol succinate 50 MG 24 hr tablet   Commonly known as:  TOPROL-XL   This may have changed:  when to take this   Used for:  Hypertension, benign essential, goal below 140/90        Dose:  50 mg   Take 1 tablet (50 mg) by mouth daily   Quantity:  90 tablet   Refills:  3       multivitamin, therapeutic with minerals Tabs tablet   This may have changed:  when to take this   Used for:  Surgery aftercare        Dose:  1 tablet   Take 1 tablet by mouth daily.   Quantity:  30 each   Refills:  1            Where to get your medicines      These medications were sent to [facility] [address] [place] [address]  [address] [facility] [address]     Phone:  [phone]     metFORMIN 500 MG 24 hr tablet                Primary Care Provider Office Phone # Fax #    Katie Gross [phone]       [address] [place] [address]        Equal Access to Services     ABBEY WALTON AH: Raquel polancoo Sotomy, waaxda luqadaha, qaybta kaalmada adeegyada, koffi fay. So [facility] [phone].    ATENCIÓN: Si habla español, tiene a snyder disposición servicios gratuitos de asistencia lingüística. Llame al [phone].    We comply with applicable federal civil rights laws and [place] laws. We do not discriminate on the basis of race, color, national origin, age, disability, sex, sexual orientation, or gender identity.            Thank you!     Thank you for choosing [facility]  for your care. Our goal is always to provide you with excellent care. Hearing back from our  patients is one way we can continue to improve our services. Please take a few minutes to complete the written survey that you may receive in the mail after your visit with us. Thank you!             Your Updated Medication List - Protect others around you: Learn how to safely use, store and throw away your medicines at www.disposemymeds.org.          This list is accurate as of 1/30/18  3:11 PM.  Always use your most recent med list.                   Brand Name Dispense Instructions for use Diagnosis    gabapentin 300 MG capsule    NEURONTIN    60 capsule    Take 1 tablet (300 mg) every night for 3 days, then if needed, go to two at HS    Type 2 diabetes mellitus with diabetic polyneuropathy, without long-term current use of insulin (H)       glipiZIDE 2.5 MG 24 hr tablet    glipiZIDE XL    90 tablet    Take 1 tablet (2.5 mg) by mouth every morning    Type 2 diabetes mellitus with diabetic polyneuropathy, without long-term current use of insulin (H)       lisinopril 10 MG tablet    PRINIVIL/ZESTRIL    90 tablet    Take 1 tablet (10 mg) by mouth daily    Persistent proteinuria       metFORMIN 500 MG 24 hr tablet    GLUCOPHAGE-XR    360 tablet    Take 2 tablets (1,000 mg) by mouth 2 times daily (with meals)    Type 2 diabetes mellitus with diabetic polyneuropathy, without long-term current use of insulin (H)       metoprolol succinate 50 MG 24 hr tablet    TOPROL-XL    90 tablet    Take 1 tablet (50 mg) by mouth daily    Hypertension, benign essential, goal below 140/90       multivitamin, therapeutic with minerals Tabs tablet     30 each    Take 1 tablet by mouth daily.    Surgery aftercare       rivaroxaban ANTICOAGULANT 20 MG Tabs tablet    XARELTO    30 tablet    Take 1 tablet (20 mg) by mouth daily (with dinner)    Atrial fibrillation with RVR (H)       tamsulosin 0.4 MG capsule    FLOMAX    90 capsule    Take 1 capsule (0.4 mg) by mouth daily    Urgency of urination, Hypertrophy of prostate without urinary  obstruction

## 2018-01-30 NOTE — PATIENT INSTRUCTIONS
NO alcohol, let your pancreas rest    See the GI specialist    Increase the metformin when you use up the 750s, to 500 mg 2 twice a day with meals    See me back in 2 months

## 2018-01-30 NOTE — MR AVS SNAPSHOT
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)        Hypertension, benign essential, goal below 140/90        Pancreatic cyst          Care Instructions    NO alcohol, let your pancreas rest    See the GI specialist    Increase the metformin when you use up the 750s, to 500 mg 2 twice a day with meals    See me back in 2 months          Follow-ups after your visit        Additional Services     GASTROENTEROLOGY ADULT REF CONSULT ONLY       Preferred Location: [facility] [phone]      Please be aware that coverage of these services is subject to the terms and limitations of your health insurance plan.  Call member services at your health plan with any benefit or coverage questions.  Any procedures must be performed at a [facility] facility OR coordinated by your clinic's referral office.    Please bring the following with you to your appointment:    (1) Any X-Rays, CTs or MRIs which have been performed.  Contact the facility where they were done to arrange for  prior to your scheduled appointment.    (2) List of current medications   (3) This referral request   (4) Any documents/labs given to you for this referral                  Your next 10 appointments already scheduled     Jan 30, 2018  3:00 PM CST   EP NEW with Domingo Ashraf MD   [facility] ([facility])    [address]  [facility] [address]   [phone]            Feb 01, 2018  7:40 AM CST   Return Visit with Emani Gallo PA-C   [facility]    [address] [address]   [phone]              Who to contact     If you have questions or need follow up information about today's clinic visit or your schedule please contact [facility] directly at [phone].  Normal or non-critical lab and imaging results will be communicated to you by [employer], letter or phone within 4 business days after the clinic has received the results. If you do not hear from us within 7 days, please contact the clinic through [employer] or phone. If you have a critical or abnormal lab result, we will notify you by phone as soon as possible.  Submit refill requests through [employer]) or call your pharmacy and they will forward the refill request to us. Please allow 3 business days for your refill to be completed.          Additional Information About Your Visit        [employer] Information     [employer] gives you secure access to your electronic health record. If you see a primary care provider, you can also send messages to your care team and make appointments. If you have questions, please call your primary care clinic.  If you do not have a primary care provider, please call [phone] and they will assist you.        Care EveryWhere ID     This is your Care EveryWhere ID. This could be used by other organizations to access your [facility] medical records  [id number]        Your Vitals Were     Pulse Temperature Respirations Pulse Oximetry BMI (Body Mass Index)       73 98.1  F (36.7  C) (Tympanic) 16 93% 28.55 kg/m2        Blood Pressure from Last 3 Encounters:   01/30/18 120/80   01/23/18 104/70   01/09/18 124/84    Weight from Last 3 Encounters:   01/30/18 199 lb (90.3 kg)   01/22/18 200 lb (90.7 kg)   01/09/18 203 lb 9.6 oz (92.4 kg)              We Performed  the Following     CBC with platelets and differential     GASTROENTEROLOGY ADULT REF CONSULT ONLY          Today's Medication Changes          These changes are accurate as of 1/30/18 10:15 AM.  If you have any questions, ask your nurse or doctor.               These medicines have changed or have updated prescriptions.        Dose/Directions    gabapentin 300 MG capsule   Commonly known as:  NEURONTIN   This may have changed:    - how much to take  - how to take this  - when to take this  - additional instructions   Used for:  Type 2 diabetes mellitus with diabetic polyneuropathy, without long-term current use of insulin (H)        Take 1 tablet (300 mg) every night for 3 days, then if needed, go to two at HS   Quantity:  60 capsule   Refills:  3       metFORMIN 500 MG 24 hr tablet   Commonly known as:  GLUCOPHAGE-XR   This may have changed:    - medication strength  - how much to take  - when to take this   Used for:  Type 2 diabetes mellitus with diabetic polyneuropathy, without long-term current use of insulin (H)   Changed by:  Katie Gross MD        Dose:  1000 mg   Take 2 tablets (1,000 mg) by mouth 2 times daily (with meals)   Quantity:  360 tablet   Refills:  1       metoprolol succinate 50 MG 24 hr tablet   Commonly known as:  TOPROL-XL   This may have changed:  when to take this   Used for:  Hypertension, benign essential, goal below 140/90        Dose:  50 mg   Take 1 tablet (50 mg) by mouth daily   Quantity:  90 tablet   Refills:  3       multivitamin, therapeutic with minerals Tabs tablet   This may have changed:  when to take this   Used for:  Surgery aftercare        Dose:  1 tablet   Take 1 tablet by mouth daily.   Quantity:  30 each   Refills:  1            Where to get your medicines      These medications were sent to [facility] - [address]  [address] [facility] [address]     Phone:  [phone]     metFORMIN 500 MG 24 hr tablet                 Primary Care Provider Office Phone # Fax #    Katie Gross [phone]       [address] [place] [address]        Equal Access to Services     JOHNSONFRAN ISABELLE : Hadbrian stoney matthew katy Bernstein, wazoraidada luqadaha, qaybta kaalmada byron, koffi alvarado vidhyaclaudia moscoso lajazzmacho sumeet. So [facility] [phone].    ATENCIÓN: Si habla español, tiene a snyder disposición servicios gratuitos de asistencia lingüística. Llame al [phone].    We comply with applicable federal civil rights laws and [place] laws. We do not discriminate on the basis of race, color, national origin, age, disability, sex, sexual orientation, or gender identity.            Thank you!     Thank you for choosing [facility]  for your care. Our goal is always to provide you with excellent care. Hearing back from our patients is one way we can continue to improve our services. Please take a few minutes to complete the written survey that you may receive in the mail after your visit with us. Thank you!             Your Updated Medication List - Protect others around you: Learn how to safely use, store and throw away your medicines at www.disposemymeds.org.          This list is accurate as of 1/30/18 10:15 AM.  Always use your most recent med list.                   Brand Name Dispense Instructions for use Diagnosis    gabapentin 300 MG capsule    NEURONTIN    60 capsule    Take 1 tablet (300 mg) every night for 3 days, then if needed, go to two at     Type 2 diabetes mellitus with diabetic polyneuropathy, without long-term current use of insulin (H)       glipiZIDE 2.5 MG 24 hr tablet    glipiZIDE XL    90 tablet    Take 1 tablet (2.5 mg) by mouth every morning    Type 2 diabetes mellitus with diabetic polyneuropathy, without long-term current use of insulin (H)       lisinopril 10 MG tablet    PRINIVIL/ZESTRIL    90 tablet    Take 1 tablet (10 mg) by mouth daily    Persistent proteinuria       metFORMIN 500 MG 24 hr  tablet    GLUCOPHAGE-XR    360 tablet    Take 2 tablets (1,000 mg) by mouth 2 times daily (with meals)    Type 2 diabetes mellitus with diabetic polyneuropathy, without long-term current use of insulin (H)       metoprolol succinate 50 MG 24 hr tablet    TOPROL-XL    90 tablet    Take 1 tablet (50 mg) by mouth daily    Hypertension, benign essential, goal below 140/90       multivitamin, therapeutic with minerals Tabs tablet     30 each    Take 1 tablet by mouth daily.    Surgery aftercare       rivaroxaban ANTICOAGULANT 20 MG Tabs tablet    XARELTO    30 tablet    Take 1 tablet (20 mg) by mouth daily (with dinner)    Atrial fibrillation with RVR (H)       tamsulosin 0.4 MG capsule    FLOMAX    90 capsule    Take 1 capsule (0.4 mg) by mouth daily    Urgency of urination, Hypertrophy of prostate without urinary obstruction

## 2018-01-30 NOTE — PROGRESS NOTES
SUBJECTIVE:   Antoine Russo is a 72 year old male who presents to clinic today for the following health issues:      Hospital Follow-up Visit:    Hospital/Nursing Home/IP Rehab Facility: [facility]  Date of Admission: 1/ 22/2018  Date of Discharge: 1/23/2018  Reason(s) for Admission: Pancreatitis            Problems taking medications regularly:  None       Medication changes since discharge: None       Problems adhering to non-medication therapy:  None    Summary of hospitalization:  [facility] discharge summary reviewed  Diagnostic Tests/Treatments reviewed.  Follow up needed: recheck CBC, monitor pancreas cyst  Other Healthcare Providers Involved in Patient s Care:         None  Update since discharge: improved.     Post Discharge Medication Reconciliation: discharge medications reconciled, continue medications without change.  Plan of care communicated with patient     Coding guidelines for this visit:  Type of Medical   Decision Making Face-to-Face Visit       within 7 Days of discharge Face-to-Face Visit        within 14 days of discharge   Moderate Complexity [id number]   High Complexity [id number]          Had another episode of pancreatitis due to alcohol use. He is better. Is not drinking. He had a pancreatic fluid collection/cyst seen on this CT scan.    Results for orders placed or performed during the hospital encounter of 01/22/18   CT Abdomen Pelvis w Contrast    Narrative    CT ABDOMEN PELVIS W CONTRAST  1/23/2018 12:20 AM     HISTORY: Abdominal pain, suspect pancreatitis. Surgically altered  anatomy.     TECHNIQUE: Volumetric acquisition through abdomen and pelvis with IV  contrast. 100 mL Isovue-370. Radiation dose for this scan was reduced  using automated exposure control, adjustment of the mA and/or kV  according to patient size, or iterative reconstruction technique.    COMPARISON: 1/2/2018.    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 Again seen is moderate fluid and stranding in the  upper abdomen which is most prominent about the pancreas and slightly  increased. Pancreas appears mildly edematous. No focal areas of  pancreatic necrosis. Mild adjacent gastric wall thickening is slightly  less prominent. Stable small 1.5 cm fluid pocket along the resection  margin in the pancreatic body. No new loculated fluid collections.    The other upper abdominal organs are stable and demonstrate no  worrisome findings. Bilateral renal cysts.    Pelvic structures within normal limits. Vascular calcification. No  abdominal aortic aneurysm. No bowel obstruction, free air or other  acute findings.      Impression    IMPRESSION:  1. Moderate fluid and stranding in the upper abdomen has increased and  is likely due to pancreatitis.  2. 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  3. Small 1.5 cm fluid pocket along the resection margin in the  pancreatic body is stable and may be a small pseudocyst. No new  loculated fluid collections.  4. Mild gastric wall thickening probably relates to the pancreatitis  and is slightly less prominent.    FANG DIALLO MD         Diabetes-on metformin, glipizide  Lab Results   Component Value Date    A1C 8.1 01/23/2018    A1C 7.0 10/10/2017    A1C 6.4 07/13/2017    A1C 8.9 04/04/2017    A1C 8.0 01/03/2017     afib-on Xarelto, is expensive, is going to see the cardiologist. On metoprolol or rate control.     Hypertension-on lisinopril, metoprolol    Problem list and histories reviewed & adjusted, as indicated.  Additional history: as documented    BP Readings from Last 3 Encounters:   01/30/18 120/80   01/23/18 104/70   01/09/18 124/84    Wt Readings from Last 3 Encounters:   01/30/18 199 lb (90.3 kg)   01/22/18 200 lb (90.7 kg)   01/09/18 203 lb 9.6 oz (92.4 kg)               Reviewed and updated as needed this visit by clinical staff  Tobacco  Allergies  Meds  Problems  Med Hx   Surg Hx  Fam Hx  Soc Hx        Reviewed and updated as needed this visit by Provider  Allergies  Meds  Problems         ROS:  C: NEGATIVE for fever, chills, change in weight  E/M: NEGATIVE for ear, mouth and throat problems  R: NEGATIVE for significant cough or SOB  CV: NEGATIVE for chest pain, palpitations or peripheral edema  GI: NEGATIVE for nausea, abdominal pain, heartburn, or change in bowel habits  : negative for dysuria, hematuria, decreased urinary stream    OBJECTIVE:                                                    /80  Pulse 73  Temp 98.1  F (36.7  C) (Tympanic)  Resp 16  Wt 199 lb (90.3 kg)  SpO2 93%  BMI 28.55 kg/m2  Body mass index is 28.55 kg/(m^2).  GENERAL: healthy, alert, well nourished, well hydrated, no distress  NECK: no tenderness, no adenopathy, no asymmetry, no masses, no stiffness; thyroid- normal to palpation  RESP: lungs clear to auscultation - no rales, no rhonchi, no wheezes  CV: irregularly irregular,  normal S1 S2, no S3 or S4 and no murmur, no click or rub -  ABDOMEN: soft, no tenderness, multiple well healed scars, no  hepatosplenomegaly, no masses, normal bowel sounds  MS: extremities- no gross deformities noted, no edema  Results for orders placed or performed in visit on 01/30/18   CBC with platelets and differential   Result Value Ref Range    WBC 10.9 4.0 - 11.0 10e9/L    RBC Count 4.03 (L) 4.4 - 5.9 10e12/L    Hemoglobin 13.5 13.3 - 17.7 g/dL    Hematocrit 42.5 40.0 - 53.0 %     (H) 78 - 100 fl    MCH 33.5 (H) 26.5 - 33.0 pg    MCHC 31.8 31.5 - 36.5 g/dL    RDW 13.9 10.0 - 15.0 %    Platelet Count 329 150 - 450 10e9/L    Diff Method Automated Method     % Neutrophils 64.1 %    % Lymphocytes 28.4 %    % Monocytes 5.3 %    % Eosinophils 1.8 %    % Basophils 0.4 %    Absolute Neutrophil 7.0 1.6 - 8.3 10e9/L    Absolute Lymphocytes 3.1 0.8 - 5.3 10e9/L    Absolute Monocytes 0.6 0.0 - 1.3 10e9/L    Absolute Eosinophils 0.2 0.0 - 0.7 10e9/L    Absolute  Basophils 0.0 0.0 - 0.2 10e9/L         ASSESSMENT/PLAN:                                                      ASSESSMENT:  1. Alcohol-induced acute pancreatitis without infection or necrosis    2. Type 2 diabetes mellitus with diabetic polyneuropathy, without long-term current use of insulin (H)    3. Chronic atrial fibrillation (H)    4. H/O colectomy    5. 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 Hypertension, benign essential, goal below 140/90    7. Pancreatic cyst        PLAN:  Orders Placed This Encounter     CBC with platelets and differential     GASTROENTEROLOGY ADULT REF CONSULT ONLY     metFORMIN (GLUCOPHAGE-XR) 500 MG 24 hr tablet     I recommend he see a GI specialist about his pancreatic cyst and the recurrent pancreatitis.   Will increase his metformin    Patient Instructions   NO alcohol, let your pancreas rest    See the GI specialist    Increase the metformin when you use up the 750s, to 500 mg 2 twice a day with meals    See me back in 2 months     Katie Gross MD  [facility]

## 2018-01-30 NOTE — PROGRESS NOTES
[id number]    Electrophysiology/ Clinic Note         H&P and Plan:           Domingo Ashraf MD    Physical Exam:  Vitals: /74 (BP Location: Right arm, Patient Position: Sitting, Cuff Size: Adult Regular)  Pulse 64  Wt 92.1 kg (203 lb)  SpO2 97%  BMI 29.13 kg/m2    Constitutional:  AAO x3.  Pt is in NAD.  HEAD: normocephalic.  SKIN: Skin normal color, texture and turgor with no lesions or eruptions.  Eyes: PERRL, EOMI.  ENT:  Supple, normal JVP. No lymphadenopathy or thyroid enlargement.  Chest:  CTAB.  Cardiac:  RRR, normal  S1 and S2.  No murmurs rubs or gallop.    Abdomen:  Normal BS.  Soft, non-tender and non-distended.  No rebound or guarding.    Extremities:  Pedious pulses palpable B/L.  No LE edema noticed.   Neurological: Strength and sensation grossly symmetric and intact throughout.       CURRENT MEDICATIONS:  Current Outpatient Prescriptions   Medication Sig Dispense Refill     metFORMIN (GLUCOPHAGE-XR) 500 MG 24 hr tablet Take 2 tablets (1,000 mg) by mouth 2 times daily (with meals) 360 tablet 1     tamsulosin (FLOMAX) 0.4 MG capsule Take 1 capsule (0.4 mg) by mouth daily 90 capsule 3     rivaroxaban ANTICOAGULANT (XARELTO) 20 MG TABS tablet Take 1 tablet (20 mg) by mouth daily (with dinner) 30 tablet 5     lisinopril (PRINIVIL/ZESTRIL) 10 MG tablet Take 1 tablet (10 mg) by mouth daily 90 tablet 1     glipiZIDE (GLIPIZIDE XL) 2.5 MG 24 hr tablet Take 1 tablet (2.5 mg) by mouth every morning 90 tablet 3     metoprolol (TOPROL-XL) 50 MG 24 hr tablet Take 1 tablet (50 mg) by mouth daily (Patient taking differently: Take 50 mg by mouth 2 times daily ) 90 tablet 3     gabapentin (NEURONTIN) 300 MG capsule Take 1 tablet (300 mg) every night for 3 days, then if needed, go to two at HS (Patient taking differently: Take 600 mg by mouth At Bedtime Take 1 tablet (300 mg) every night for 3 days, then if needed, go to two at HS) 60 capsule 3     multivitamin, therapeutic with minerals (THERA-VIT-M) TABS Take 1  tablet by mouth daily. (Patient taking differently: Take 1 tablet by mouth every morning ) 30 each 1     [DISCONTINUED] metFORMIN (GLUCOPHAGE-XR) 750 MG 24 hr tablet Take 2 tablets (1,500 mg) by mouth daily (Patient taking differently: Take 1,500 mg by mouth every morning ) 180 tablet 3       ALLERGIES     Allergies   Allergen Reactions     Nkda [No Known Drug Allergies]        PAST MEDICAL HISTORY:  Past Medical History:   Diagnosis Date     C. 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     Noninfectious ileitis        PAST SURGICAL HISTORY:  Past Surgical History:   Procedure Laterality Date     BREAST SURGERY  2008    right breast mass benign     COLONOSCOPY      multiple polyps removed     COLONOSCOPY  8/24/2011    Procedure:COMBINED COLONOSCOPY, REMOVE TUMOR/POLYP/LESION BY SNARE; Surgeon:MILEY ARBOLEDA; Location:[facility]     COLONOSCOPY  12/17/2012    Procedure: COLONOSCOPY;;  Surgeon: Leon Maurer MD;  [facility]     COLONOSCOPY  12/18/2012    Procedure: COLONOSCOPY;;  Surgeon: Leon Maurer MD;  [facility]     DENERVATION OF SPERMATIC CORD MICROSURGICAL Left 5/23/2017    Procedure: DENERVATION OF SPERMATIC CORD MICROSURGICAL;;  Surgeon: Marcio Aggarwal MD;  [facility]     DISSECTION RADICAL NECK BILATERAL  8/2/2012    Procedure: DISSECTION RADICAL NECK BILATERAL;;  Surgeon: Yung Alvares MD;  [facility]     ENDOSCOPIC RETROGRADE CHOLANGIOPANCREATOGRAM N/A 5/10/2016    Procedure: COMBINED ENDOSCOPIC RETROGRADE CHOLANGIOPANCREATOGRAPHY, PLACE TUBE/STENT;  Surgeon: Yovanny Beasley MD;  [facility]     ENDOSCOPIC ULTRASOUND UPPER GASTROINTESTINAL TRACT (GI) N/A 2/3/2016    Procedure: ENDOSCOPIC ULTRASOUND, ESOPHAGOSCOPY / UPPER GASTROINTESTINAL TRACT (GI);  Surgeon: Grabiel Plata MD;   [facility]     ESOPHAGOSCOPY, GASTROSCOPY, DUODENOSCOPY (EGD), COMBINED N/A 2/3/2016    Procedure: COMBINED ENDOSCOPIC ULTRASOUND, ESOPHAGOSCOPY, GASTROSCOPY, DUODENOSCOPY (EGD), FINE NEEDLE ASPIRATE/BIOPSY;  Surgeon: Grabiel Plata MD;  [facility]     ESOPHAGOSCOPY, GASTROSCOPY, DUODENOSCOPY (EGD), COMBINED N/A 6/8/2016    Procedure: COMBINED ESOPHAGOSCOPY, GASTROSCOPY, DUODENOSCOPY (EGD), REMOVE FOREIGN BODY;  Surgeon: Yovanny Beasley MD;  [facility]     EXCISE LESION INTRAORAL  6/14/2012    Procedure: EXCISE LESION INTRAORAL;  Wide Local Excision Floor of Mouth, Direct Laryngoscopy, Bilateral [place]'s Marsuplization, Split Thickness Skin Graft from right Thigh  Latex Safe;  Surgeon: Gerson Ravi MD;  [facility]     EXCISE LESION INTRAORAL  8/2/2012    Procedure: EXCISE LESION INTRAORAL;  Floor of Mouth Resection, Bilateral Selective Radical Neck Dissection, Tracheostomy, Left Radial Forearm  Free Flap with Alloderm, Nasogastric Feeding Tube Placement,    * Latex Safe*;  Surgeon: Gerson Ravi MD;  [facility]     EXCISE LESION INTRAORAL  12/11/2012    Procedure: EXCISE LESION INTRAORAL;  takedown of oral flap;  Surgeon: Yung Alvares MD;  [facility]     GRAFT FREE VASCULARIZED (LOCATION)  8/2/2012    Procedure: GRAFT FREE VASCULARIZED (LOCATION);;  Surgeon: Yung Alvares MD;  [facility]     GRAFT SKIN SPLIT THICKNESS FROM EXTREMITY  6/14/2012    Procedure: GRAFT SKIN SPLIT THICKNESS FROM EXTREMITY;;  Surgeon: Gerson Ravi MD;  [facility]     LAPAROSCOPIC ILEOSTOMY TAKEDOWN  6/6/2013    Procedure: LAPAROSCOPIC ILEOSTOMY TAKEDOWN;  Laparoscopic Closure of Enterostomy, Guerda's Type with IleoRectal Anastomosis ;  Surgeon: Grabiel Riddle MD;  [facility]     LAPAROTOMY EXPLORATORY  12/20/2012    Procedure: LAPAROTOMY EXPLORATORY;  Exploratory Laparotomy, total abdominal colectomy, ileostomy formation;  Surgeon: Miquel Cannon MD;   [facility]     LARYNGOSCOPY  2012    Procedure: LARYNGOSCOPY;;  Surgeon: Gerson Ravi MD;  [facility]     ORTHOPEDIC SURGERY      ganglian cyst left ankle     PANCREATECTOMY, SPLENECTOMY N/A 3/10/2016    Procedure: PANCREATECTOMY, SPLENECTOMY;  Surgeon: Nael Abel MD;  [facility]     SHOULDER SURGERY  , 2008    2006- right rotator cuff, 2008 bone spur on left. Dr. Hdez     VARICOCELECTOMY Left 2017    Procedure: VARICOCELECTOMY;  Left Varicocele Repair, Denervation of Left Testis;  Surgeon: Marcio Aggarwal MD;  [facility]       FAMILY HISTORY:  Family History   Problem Relation Age of Onset     DIABETES Sister      onset age 50     Alzheimer Disease Mother       80     Alzheimer Disease Father       85     DIABETES Other      nephew type 1     DIABETES Other      Anesthesia Reaction No family hx of      Colon Cancer No family hx of      Colon Polyps No family hx of      Crohn Disease No family hx of      Ulcerative Colitis No family hx of        SOCIAL HISTORY:  Social History     Social History     Marital status:      Spouse name: N/A     Number of children: N/A     Years of education: N/A     Occupational History     [employer]      20 hr/week monday-Thur 7-noon     [employer]     RETIRED      Social History Main Topics     Smoking status: Former Smoker     Packs/day: 1.00     Years: 40.00     Types: Cigarettes     Quit date: 2006     Smokeless tobacco: Never Used     Alcohol use Yes      Comment: 6-12 beers/daily for at least 40 years; now 3-4 beers daily now     Drug use: No     Sexual activity: Yes     Partners: Female     Other Topics Concern      Service Yes     [employer]erves 6 years     Blood Transfusions No     Caffeine Concern Yes     0-2 cups     Occupational Exposure No     retired     Hobby Hazards No     Sleep Concern No     Stress Concern No     Weight Concern No     Special Diet Yes      healthy     Back Care No     Exercise Yes     Bike Helmet Not Asked     N/A     Seat Belt Yes     Self-Exams Yes     Parent/Sibling W/ Cabg, Mi Or Angioplasty Before 65f 55m? No     Social History Narrative    Son lives in [place] with 2 grandchildren 19 and 6       Review of Systems:  Skin:  Positive for bruising   Eyes:  Positive for glasses  ENT:  Negative    Respiratory:  Negative    Cardiovascular:    Positive for;fatigue  Gastroenterology: Positive for abdominal pain  Genitourinary:  Negative    Musculoskeletal:  Negative    Neurologic:  Positive for numbness or tingling of feet  Psychiatric:  Negative    Heme/Lymph/Imm:  Negative    Endocrine:  Positive for diabetes      Recent Lab Results:  LIPID RESULTS:  Lab Results   Component Value Date    CHOL 170 07/13/2017    HDL 30 (L) 07/13/2017     (H) 07/13/2017    TRIG 200 (H) 07/13/2017    CHOLHDLRATIO 5.3 (H) 04/14/2015       LIVER ENZYME RESULTS:  Lab Results   Component Value Date    AST 14 01/22/2018    ALT 27 01/22/2018       CBC RESULTS:  Lab Results   Component Value Date    WBC 10.9 01/30/2018    RBC 4.03 (L) 01/30/2018    HGB 13.5 01/30/2018    HCT 42.5 01/30/2018     (H) 01/30/2018    MCH 33.5 (H) 01/30/2018    MCHC 31.8 01/30/2018    RDW 13.9 01/30/2018     01/30/2018       BMP RESULTS:  Lab Results   Component Value Date     01/23/2018    POTASSIUM 4.2 01/23/2018    CHLORIDE 106 01/23/2018    CO2 21 01/23/2018    ANIONGAP 10 01/23/2018     (H) 01/23/2018    BUN 9 01/23/2018    CR 0.77 01/23/2018    GFRESTIMATED >90 01/23/2018    GFRESTBLACK >90 01/23/2018    NILSON 8.1 (L) 01/23/2018        A1C RESULTS:  Lab Results   Component Value Date    A1C 8.1 (H) 01/23/2018       INR RESULTS:  Lab Results   Component Value Date    INR 1.33 (H) 04/08/2016    INR 1.30 (H) 03/31/2016         ECHOCARDIOGRAM  Recent Results (from the past 8760 hour(s))   Echocardiogram Complete    Narrative    [id number]  [id number]  [id number]  [id number]^VAN  HEMANT^WILLA^AMANDA           [facility]  Echocardiography Laboratory  [address]  [place] [address]        Name: SAMANTHA CHRISTIAN  MRN: [id number]  : 1945  Study Date: 01/10/2018 10:39 AM  Age: 72 yrs  Gender: Male  Patient Location: [facility]  Reason For Study: Atrial fibrillation with RVR  Ordering Physician: WILLA GARCIA  Referring Physician: WILLA GARCIA  Performed By: Dominique Bonds RDCS     BSA: 2.1 m2  Height: 70 in  Weight: 204 lb  HR: 110  BP: 142/85 mmHg  _____________________________________________________________________________  __        Procedure  Complete Echo Adult.  _____________________________________________________________________________  __        Interpretation Summary     Underlying rhythm is atrial fibrillation with rapid ventricular rates of 100-  110 bpm.     1. Normal left ventricle size and systolic function. LV ejection fraction 55-  60%. No regional wall motion abnormalities.  2. Normal right ventricular size and systolic function.  4. No hemodynamically significant valve disease. Trace mitral and tricuspid  regurgitation.     Compared to the last echocardiogram dated 6/3/2013, atrial fibrillation has  replaced sinus rhythm. No other significant changes.  _____________________________________________________________________________  __        Left Ventricle  The left ventricle is normal in size. There is mild concentric left  ventricular hypertrophy. Left ventricular systolic function is normal. The  visual ejection fraction is estimated at 55-60%. Left ventricular diastolic  function is indeterminate. No regional wall motion abnormalities noted.     Right Ventricle  The right ventricle is normal in size and function.     Atria  The left atrium is mildly dilated. Right atrial size is normal. There is no  color Doppler evidence of an atrial shunt.     Mitral Valve  The mitral valve leaflets are mildly thickened. There is trace  mitral  regurgitation.        Tricuspid Valve  Normal tricuspid valve. There is trace tricuspid regurgitation. Right  ventricular systolic pressure could not be approximated due to inadequate  tricuspid regurgitation.     Aortic Valve  The aortic valve is trileaflet. There is trace aortic regurgitation. No aortic  stenosis is present.     Pulmonic Valve  The pulmonic valve is not well seen, but is grossly normal. There is trace  pulmonic valvular regurgitation. Normal pulmonic valve velocity.     Vessels  The aortic root is normal size. (3.5 centimeters). Normal size ascending  aorta. (3.3 centimeters). The IVC is normal in size and reactivity with  respiration, suggesting normal central venous pressure.     Pericardium  There is no pericardial effusion.        Rhythm  The rhythm was rapid atrial fibrillation.  _____________________________________________________________________________  __  MMode/2D Measurements & Calculations  IVSd: 1.4 cm     LVIDd: 5.0 cm  LVIDs: 3.7 cm  LVPWd: 1.4 cm  FS: 27.2 %  EDV(Teich): 120.1 ml  ESV(Teich): 56.8 ml  LV mass(C)d: 283.7 grams  LV mass(C)dI: 134.8 grams/m2  Ao root diam: 3.5 cm  asc Aorta Diam: 3.3 cm  LA volume (AL/bp): 67.2 cm3     LA Volume Indexed (AL/bp): 31.9 ml/m2  RWT: 0.54        Doppler Measurements & Calculations  MV E max oz: 69.5 cm/sec  MV dec time: 0.22 sec  E/E' av.8  Lateral E/e': 8.4  Medial E/e': 9.2              _____________________________________________________________________________  __        Report approved by: Dr Ozzy Saeed 01/10/2018 03:20 PM            Orders Placed This Encounter   Procedures     EKG 12-LEAD [facility]IN[facility] READ ([facility] [place])- to be scheduled     No orders of the defined types were placed in this encounter.    There are no discontinued medications.      Encounter Diagnosis   Name Primary?     Paroxysmal atrial fibrillation (H) Yes         CC  Katie Gross MD  [address] [place] [address]

## 2018-01-30 NOTE — LETTER
1/30/2018      Katie Gross MD  [address] [place] [address]      RE: Antoine LOPEZ Rafaela       Dear Colleague,    I had the pleasure of seeing Antoine Russo in the [facility].    Service Date: 01/30/2018      REASON FOR VISIT:  Evaluation of atrial fibrillation.      HISTORY OF PRESENT ILLNESS:  Mr. Russo is a pleasant 72-year-old gentleman with history of hypertension, hyperlipidemia, peripheral vascular disease, diabetes, oral cancer (status post resection in 2012) and paroxysmal AFib who was recently admitted in the hospital with acute pancreatitis and was referred here for evaluation of atrial fibrillation.      The patient informed that he has history of  Paroxysmal atrial fibrillation, which was diagnosed as an incidental finding in the past.  Most recently he was evaluated by his PCP on 01/09, and actually during the visit he was in atrial fibrillation.  Later he was admitted in the hospital on 01/22 with acute pancreatitis.  Hospital stay was complicated by AFib with moments of RVR.  He was started on Xarelto and metoprolol.  After discharge, he was referred here for evaluation.      At the moment, he is doing well.  He denies any symptoms such as chest pain, lightheadedness, near-syncope or syncopal episode.      EKG today revealed that he is back in normal rhythm.  Echocardiogram done 01/10 showed an EF of 60% with no significant valve disease.      ASSESSMENT AND PLAN:   1.  Paroxysmal AFib.  He seems to be back in normal rhythm.  He is asymptomatic.  We will continue metoprolol therapy.   2.  Embolic prevention.  CHADS-VASc score of 3.  I recommend anticoagulation indefinitely.  He complains about the price of Xarelto.  I referred him to the Coumadin Clinic.  He decided to follow up with PCP and be hooked to the Coumadin Clinic close to his house.   3.  Followup care.  Follow up in clinic in a year or earlier as needed.         KIRILL JENKINS MD              D: 2018   T: 2018   MT: GINNA      Name:     SAMANTHA CHRISTIAN   MRN:      [id number]        Account:      [id number]   :      1945           Service Date: 2018      Document: [id number]           Outpatient Encounter Prescriptions as of 2018   Medication Sig Dispense Refill     metFORMIN (GLUCOPHAGE-XR) 500 MG 24 hr tablet Take 2 tablets (1,000 mg) by mouth 2 times daily (with meals) 360 tablet 1     tamsulosin (FLOMAX) 0.4 MG capsule Take 1 capsule (0.4 mg) by mouth daily 90 capsule 3     [DISCONTINUED] rivaroxaban ANTICOAGULANT (XARELTO) 20 MG TABS tablet Take 1 tablet (20 mg) by mouth daily (with dinner) 30 tablet 5     lisinopril (PRINIVIL/ZESTRIL) 10 MG tablet Take 1 tablet (10 mg) by mouth daily 90 tablet 1     glipiZIDE (GLIPIZIDE XL) 2.5 MG 24 hr tablet Take 1 tablet (2.5 mg) by mouth every morning 90 tablet 3     metoprolol (TOPROL-XL) 50 MG 24 hr tablet Take 1 tablet (50 mg) by mouth daily (Patient taking differently: Take 50 mg by mouth 2 times daily ) 90 tablet 3     gabapentin (NEURONTIN) 300 MG capsule Take 1 tablet (300 mg) every night for 3 days, then if needed, go to two at HS (Patient taking differently: Take 600 mg by mouth At Bedtime Take 1 tablet (300 mg) every night for 3 days, then if needed, go to two at HS) 60 capsule 3     multivitamin, therapeutic with minerals (THERA-VIT-M) TABS Take 1 tablet by mouth daily. (Patient taking differently: Take 1 tablet by mouth every morning ) 30 each 1     No facility-administered encounter medications on file as of 2018.        Again, thank you for allowing me to participate in the care of your patient.      Sincerely,    Domingo Ashraf MD     [facility]

## 2018-01-31 NOTE — PROGRESS NOTES
Service Date: 2018      REASON FOR VISIT:  Evaluation of atrial fibrillation.      HISTORY OF PRESENT ILLNESS:  Mr. Christian is a pleasant 72-year-old gentleman with history of hypertension, hyperlipidemia, peripheral vascular disease, diabetes, oral cancer (status post resection in ) and paroxysmal AFib who was recently admitted in the hospital with acute pancreatitis and was referred here for evaluation of atrial fibrillation.      The patient informed that he has history of  Paroxysmal atrial fibrillation, which was diagnosed as an incidental finding in the past.  Most recently he was evaluated by his PCP on , and actually during the visit he was in atrial fibrillation.  Later he was admitted in the hospital on  with acute pancreatitis.  Hospital stay was complicated by AFib with moments of RVR.  He was started on Xarelto and metoprolol.  After discharge, he was referred here for evaluation.      At the moment, he is doing well.  He denies any symptoms such as chest pain, lightheadedness, near-syncope or syncopal episode.      EKG today revealed that he is back in normal rhythm.  Echocardiogram done 01/10 showed an EF of 60% with no significant valve disease.      ASSESSMENT AND PLAN:   1.  Paroxysmal AFib.  He seems to be back in normal rhythm.  He is asymptomatic.  We will continue metoprolol therapy.   2.  Embolic prevention.  CHADS-VASc score of 3.  I recommend anticoagulation indefinitely.  He complains about the price of Xarelto.  I referred him to the Coumadin Clinic.  He decided to follow up with PCP and be hooked to the Coumadin Clinic close to his house.   3.  Followup care.  Follow up in clinic in a year or earlier as needed.         KIRILL JENKINS MD             D: 2018   T: 2018   MT: GINNA      Name:     SAMANTHA CHRISTIAN   MRN:      [id number]        Account:      [id number]   :      1945           Service Date: 2018      Document: [id number]

## 2018-02-01 ENCOUNTER — MYC MEDICAL ADVICE (OUTPATIENT)
Dept: FAMILY MEDICINE | Facility: CLINIC | Age: 73
End: 2018-02-01

## 2018-02-01 ENCOUNTER — OFFICE VISIT (OUTPATIENT)
Dept: DERMATOLOGY | Facility: CLINIC | Age: 73
End: 2018-02-01
Payer: COMMERCIAL

## 2018-02-01 VITALS — RESPIRATION RATE: 16 BRPM | DIASTOLIC BLOOD PRESSURE: 70 MMHG | SYSTOLIC BLOOD PRESSURE: 135 MMHG | HEART RATE: 71 BPM

## 2018-02-01 DIAGNOSIS — I48.20 CHRONIC ATRIAL FIBRILLATION (H): Primary | Chronic | ICD-10-CM

## 2018-02-01 DIAGNOSIS — L82.0 INFLAMED SEBORRHEIC KERATOSIS: Primary | ICD-10-CM

## 2018-02-01 DIAGNOSIS — I48.20 CHRONIC ATRIAL FIBRILLATION (H): Primary | ICD-10-CM

## 2018-02-01 PROCEDURE — 99207 ZZC NO CHARGE LOS: CPT | Performed by: PHYSICIAN ASSISTANT

## 2018-02-01 PROCEDURE — 17110 DESTRUCTION B9 LES UP TO 14: CPT | Performed by: PHYSICIAN ASSISTANT

## 2018-02-01 NOTE — MR AVS SNAPSHOT
After Visit Summary   2/1/2018    Antoine Russo    MRN: [id number]           Patient Information     Date Of Birth          1945        Visit Information        Provider Department      2/1/2018 7:40 AM Emani Gallo PA-C [facility]        Today's Diagnoses     Inflamed seborrheic keratosis    -  1      Care Instructions    WOUND CARE INSTRUCTIONS   FOR CRYOSURGERY   This area treated with liquid nitrogen will form a blister. You do not need to bandage the area until after the blister forms and breaks (which may be a few days). When the blister breaks, begin daily dressing changes as follows:   1) Clean and dry the area with tap water using clean Q-tip or sterile gauze pad.   2) Apply Polysporin ointment or Bacitracin ointment over entire wound. Do NOT use Neosporin ointment.   3) Cover the wound with a band-aid or sterile non-stick gauze pad and micropore paper tape.   REPEAT THESE INSTRUCTIONS AT LEAST ONCE A DAY UNTIL THE WOUND HAS COMPLETELY HEALED.   It is an old wives tale that a wound heals better when it is exposed to air and allowed to dry out. The wound will heal faster with a better cosmetic result if it is kept moist with ointment and covered with a bandage.   Do not let the wound dry out.   IMPORTANT INFORMATION ON REVERSE SIDE   Supplies Needed:   *Cotton tipped applicators (Q-tips)   *Polysporin ointment or Bacitracin ointment (NOT NEOSPORIN)   *Band-aids, or non stick gauze pads and micropore paper tape   PATIENT INFORMATION   During the healing process you will notice a number of changes. All wounds develop a small halo of redness surrounding the wound. This means healing is occurring. Severe itching with extensive redness usually indicates sensitivity to the ointment or bandage tape used to dress the wound. You should call our office if this develops.   Swelling and/or discoloration around your surgical site is common, particularly when performed  around the eye.   All wounds normally drain. The larger the wound the more drainage there will be. After 7-10 days, you will notice the wound beginning to shrink and new skin will begin to grow. The wound is healed when you can see skin has formed over the entire area. A healed wound has a healthy, shiny look to the surface and is red to dark pink in color to normalize. Wounds may take approximately 4-6 weeks to heal. Larger wounds may take 6-8 weeks. After the wound is healed you may discontinue dressing changes.   You may experience a sensation of tightness as your wound heals. This is normal and will gradually subside.   Your healed wound may be sensitive to temperature changes. This sensitivity improves with time, but if you re having a lot of discomfort, try to avoid temperature extremes.   Patients frequently experience itching after their wound appears to have healed because of the continue healing under the skin. Plain Vaseline will help relieve the itching.                 Follow-ups after your visit        Your next 10 appointments already scheduled     Mar 30, 2018  8:00 AM CDT   [facility] Katie Gross MD   [facility]    [address] [place] [address] [phone]           Bring a current list of meds and any records pertaining to this visit. For Physicals, please bring immunization records and any forms needing to be filled out. Please arrive 10 minutes early to complete paperwork.              Who to contact     If you have questions or need follow up information about today's clinic visit or your schedule please contact [facility] directly at [phone].  Normal or non-critical lab and imaging results will be communicated to you by MyChart, letter or phone within 4 business days after the clinic has received the results. If you do not hear from us within 7 days, please contact the clinic through MyChart or phone. If  you have a critical or abnormal lab result, we will notify you by phone as soon as possible.  Submit refill requests through [employer] or call your pharmacy and they will forward the refill request to us. Please allow 3 business days for your refill to be completed.          Additional Information About Your Visit        [facility] Information     [employer] gives you secure access to your electronic health record. If you see a primary care provider, you can also send messages to your care team and make appointments. If you have questions, please call your primary care clinic.  If you do not have a primary care provider, please call [phone] and they will assist you.        Care EveryWhere ID     This is your Care EveryWhere ID. This could be used by other organizations to access your [place] medical records  [id number]        Your Vitals Were     Pulse Respirations                71 16           Blood Pressure from Last 3 Encounters:   02/01/18 135/70   01/30/18 147/74   01/30/18 120/80    Weight from Last 3 Encounters:   01/30/18 92.1 kg (203 lb)   01/30/18 90.3 kg (199 lb)   01/22/18 90.7 kg (200 lb)              We Performed the Following     DESTRUCT BENIGN LESION, UP TO 14          Today's Medication Changes          These changes are accurate as of 2/1/18  8:15 PM.  If you have any questions, ask your nurse or doctor.               These medicines have changed or have updated prescriptions.        Dose/Directions    gabapentin 300 MG capsule   Commonly known as:  NEURONTIN   This may have changed:    - how much to take  - how to take this  - when to take this  - additional instructions   Used for:  Type 2 diabetes mellitus with diabetic polyneuropathy, without long-term current use of insulin (H)        Take 1 tablet (300 mg) every night for 3 days, then if needed, go to two at HS   Quantity:  60 capsule   Refills:  3       metoprolol succinate 50 MG 24 hr tablet   Commonly known as:  TOPROL-XL   This may have  changed:  when to take this   Used for:  Hypertension, benign essential, goal below 140/90        Dose:  50 mg   Take 1 tablet (50 mg) by mouth daily   Quantity:  90 tablet   Refills:  3       multivitamin, therapeutic with minerals Tabs tablet   This may have changed:  when to take this   Used for:  Surgery aftercare        Dose:  1 tablet   Take 1 tablet by mouth daily.   Quantity:  30 each   Refills:  1                Primary Care Provider Office Phone # Fax #    Katie Gross [phone]       [address] [place] [address]        Equal Access to Services     [facility]: Hadii stoney matthew hadasho Soomaali, waaxda luqadaha, qaybta kaalmada adeegyadae, koffi davidson . So [facility] [phone].    ATENCIÓN: Si habla español, tiene a snyder disposición servicios gratuitos de asistencia lingüística. Llame al [phone].    We comply with applicable federal civil rights laws and [place] laws. We do not discriminate on the basis of race, color, national origin, age, disability, sex, sexual orientation, or gender identity.            Thank you!     Thank you for choosing [facility]  for your care. Our goal is always to provide you with excellent care. Hearing back from our patients is one way we can continue to improve our services. Please take a few minutes to complete the written survey that you may receive in the mail after your visit with us. Thank you!             Your Updated Medication List - Protect others around you: Learn how to safely use, store and throw away your medicines at www.disposemymeds.org.          This list is accurate as of 2/1/18  8:15 PM.  Always use your most recent med list.                   Brand Name Dispense Instructions for use Diagnosis    gabapentin 300 MG capsule    NEURONTIN    60 capsule    Take 1 tablet (300 mg) every night for 3 days, then if needed, go to two at     Type 2 diabetes mellitus with diabetic polyneuropathy,  without long-term current use of insulin (H)       glipiZIDE 2.5 MG 24 hr tablet    glipiZIDE XL    90 tablet    Take 1 tablet (2.5 mg) by mouth every morning    Type 2 diabetes mellitus with diabetic polyneuropathy, without long-term current use of insulin (H)       lisinopril 10 MG tablet    PRINIVIL/ZESTRIL    90 tablet    Take 1 tablet (10 mg) by mouth daily    Persistent proteinuria       metFORMIN 500 MG 24 hr tablet    GLUCOPHAGE-XR    360 tablet    Take 2 tablets (1,000 mg) by mouth 2 times daily (with meals)    Type 2 diabetes mellitus with diabetic polyneuropathy, without long-term current use of insulin (H)       metoprolol succinate 50 MG 24 hr tablet    TOPROL-XL    90 tablet    Take 1 tablet (50 mg) by mouth daily    Hypertension, benign essential, goal below 140/90       multivitamin, therapeutic with minerals Tabs tablet     30 each    Take 1 tablet by mouth daily.    Surgery aftercare       rivaroxaban ANTICOAGULANT 20 MG Tabs tablet    XARELTO    30 tablet    Take 1 tablet (20 mg) by mouth daily (with dinner)    Atrial fibrillation with RVR (H)       tamsulosin 0.4 MG capsule    FLOMAX    90 capsule    Take 1 capsule (0.4 mg) by mouth daily    Urgency of urination, Hypertrophy of prostate without urinary obstruction

## 2018-02-01 NOTE — LETTER
2/1/2018         RE: Antoine Russo  [address] [address]        Dear Colleague,    Thank you for referring your patient, Antoine Russo, to the [facility]. Please see a copy of my visit note below.    Patient is here to treat inflamed seborrheic keratosis on left cheek.   Patient reports some of area resolved but some of it remain.   Inflamed seborrheic keratosis on left cheek x 1  LN2:  Treated with LN2 for 5s for 1-2 cycles. Warned risks of blistering, pain, pigment change, scarring, and incomplete resolution.  Advised patient to return if lesions do not completely resolve.  Wound care sheet given.      Again, thank you for allowing me to participate in the care of your patient.        Sincerely,        Emani Falk PA-C

## 2018-02-01 NOTE — NURSING NOTE
"Chief Complaint   Patient presents with     Derm Problem     Recheck lesion under eye       Initial /70  Pulse 71  Resp 16 Estimated body mass index is 29.13 kg/(m^2) as calculated from the following:    Height as of 1/1/18: 5' 10\" (1.778 m).    Weight as of 1/30/18: 203 lb (92.1 kg).  Medication Reconciliation: complete    "

## 2018-02-02 NOTE — PROGRESS NOTES
Patient is here to treat inflamed seborrheic keratosis on left cheek.   Patient reports some of area resolved but some of it remain.   Inflamed seborrheic keratosis on left cheek x 1  LN2:  Treated with LN2 for 5s for 1-2 cycles. Warned risks of blistering, pain, pigment change, scarring, and incomplete resolution.  Advised patient to return if lesions do not completely resolve.  Wound care sheet given.

## 2018-02-05 ENCOUNTER — TELEPHONE (OUTPATIENT)
Dept: ANTICOAGULATION | Facility: CLINIC | Age: 73
End: 2018-02-05

## 2018-02-05 NOTE — TELEPHONE ENCOUNTER
"Writer attempted to call patient twice on cell number provided. The phone rings and then it sounds as though a person picks up and there is an odd beeping noise. Writer attempted the other number listed as the home number and the message received is \" I'm sorry this person has a voicemail box that is not set up\". Writer set an Anticoagulation introduction letter to the patient via [employer] and the mail.    Doris Gallardo RN  Anticoagulation Clinic    "

## 2018-02-12 ENCOUNTER — ANTICOAGULATION THERAPY VISIT (OUTPATIENT)
Dept: ANTICOAGULATION | Facility: CLINIC | Age: 73
End: 2018-02-12
Payer: COMMERCIAL

## 2018-02-12 DIAGNOSIS — I48.20 CHRONIC ATRIAL FIBRILLATION (H): ICD-10-CM

## 2018-02-12 LAB — INR POINT OF CARE: 1.1 (ref 0.86–1.14)

## 2018-02-12 PROCEDURE — 85610 PROTHROMBIN TIME: CPT | Mod: QW

## 2018-02-12 PROCEDURE — 99211 OFF/OP EST MAY X REQ PHY/QHP: CPT

## 2018-02-12 PROCEDURE — 36416 COLLJ CAPILLARY BLOOD SPEC: CPT

## 2018-02-12 RX ORDER — WARFARIN SODIUM 5 MG/1
5 TABLET ORAL DAILY
Qty: 30 TABLET | Refills: 0 | Status: ON HOLD | OUTPATIENT
Start: 2018-02-12 | End: 2018-03-01

## 2018-02-12 NOTE — PROGRESS NOTES
ANTICOAGULATION INITIAL CLINIC VISIT    Patient Name:  Antoine Russo  Date:  2/12/2018  Referred by: Dr. Katie Gross  Contact Type:  Face to Face    SUBJECTIVE:  Coumadin education was completed today.  Topics covered include:  -Introduction to coumadin  -Proper Administration- Will take in the pm at supper time.  -INR Testing  -Sign/Symptoms of Bleeding  -Signs/Symptoms of Clot Formation or Stroke  -Dietary Intake of Vitamin K- eats some greens, educated on consistency.  -Drug Interactions  -Anticoagulation Identification (bracelet, necklace or wallet card)- buying ID bracelet at [facility]  -Future Surgery  -Effects of Alcohol, Tobacco, and Exercise on Coumadin No tabacco, no alcohol currently  Coumadin Education Booklet and Coumadin Identification Wallet Card were given to the patient.       Patient Findings     Positives Initiation of therapy    Comments Poli is 72 year old retired  that stopped Xarelto due to the cost. He was referred to the [facility] sometime ago but did not make an appt until today. He was recently hospitalized with Pancreatitis. He was not used alcohol since his hospitalization. A prescription for Warfarin 5mg #30 was sent into [facility] today. He will pick it up and begin his dose today.          OBJECTIVE    INR Protime   Date Value Ref Range Status   02/12/2018 1.1 0.86 - 1.14 Final       ASSESSMENT / PLAN  INR assessment SUB initaition of therapy   Recheck INR In: 3 DAYS    INR Location Clinic      Anticoagulation Summary as of 2/12/2018     INR goal 2.0-3.0   Today's INR 1.1!   Maintenance plan No maintenance plan   Full instructions 2/12: 5 mg; 2/13: 5 mg; 2/14: 5 mg; 2/15: 5 mg; Otherwise No maintenance plan   Next INR check 2/15/2018   Target end date Indefinite    Indications   Chronic atrial fibrillation (H) [I48.2]         Anticoagulation Episode Summary     INR check location     Preferred lab     Send INR reminders to [facility] CLINIC POOL    Comments *       Anticoagulation Care Providers     Provider Role Specialty Phone number    Katie Gross MD Referring [facility] [phone]            See the Encounter Report to view Anticoagulation Flowsheet and Dosing Calendar (Go to Encounters tab in chart review, and find the Anticoagulation Therapy Visit)    Dosage adjustment made based on physician directed care plan. Warfarin 5mg daily started today.    Doris Gallardo RN

## 2018-02-12 NOTE — MR AVS SNAPSHOT
Antoine Russo   2/12/2018 9:45 AM   Anticoagulation Therapy Visit    Description:  72 year old male   Provider:  NB ANTI COAG   Department:  Nb Anticoag           INR as of 2/12/2018     Today's INR 1.1!      Anticoagulation Summary as of 2/12/2018     INR goal 2.0-3.0   Today's INR 1.1!   Full instructions 2/12: 5 mg; 2/13: 5 mg; 2/14: 5 mg; 2/15: 5 mg; Otherwise No maintenance plan   Next INR check 2/15/2018    Indications   Chronic atrial fibrillation (H) [I48.2]         Your next Anticoagulation Clinic appointment(s)     Feb 15, 2018  9:45 AM CST   Anticoagulation Visit with NB ANTI COAG   [facility]    [address] [place] [address] [phone]              Contact Numbers     Please call [phone] with any problems or questions regarding your therapy.    If you need to cancel and/or reschedule your appointment please call one of the following numbers:  [facility] - [phone]  [facility] [phone]  Center - [phone]  [facility] [phone]  [place] - [phone]            February 2018 Details    Sun Mon Tue Wed Thu Fri Sat         1               2               3                 4               5               6               7               8               9               10                 11               12      5 mg   See details      13      5 mg         14      5 mg         15            16               17                 18               19               20               21               22               23               24                 25               26               27               28                   Date Details   02/12 This INR check       Date of next INR:  2/15/2018         How to take your warfarin dose     To take:  5 mg Take 1 of the 5 mg tablets.

## 2018-02-15 ENCOUNTER — ANTICOAGULATION THERAPY VISIT (OUTPATIENT)
Dept: ANTICOAGULATION | Facility: CLINIC | Age: 73
End: 2018-02-15
Payer: COMMERCIAL

## 2018-02-15 DIAGNOSIS — I48.20 CHRONIC ATRIAL FIBRILLATION (H): ICD-10-CM

## 2018-02-15 LAB — INR POINT OF CARE: 1.4 (ref 0.86–1.14)

## 2018-02-15 PROCEDURE — 99207 ZZC NO CHARGE NURSE ONLY: CPT

## 2018-02-15 PROCEDURE — 85610 PROTHROMBIN TIME: CPT | Mod: QW

## 2018-02-15 PROCEDURE — 36416 COLLJ CAPILLARY BLOOD SPEC: CPT

## 2018-02-15 NOTE — MR AVS SNAPSHOT
Antoine Russo   2/15/2018 9:45 AM   Anticoagulation Therapy Visit    Description:  72 year old male   Provider:  NB ANTI COAG   Department:  Nb Anticoag           INR as of 2/15/2018     Today's INR 1.4!      Anticoagulation Summary as of 2/15/2018     INR goal 2.0-3.0   Today's INR 1.4!   Full instructions 2/15: 7.5 mg; 2/16: 5 mg; 2/17: 7.5 mg; 2/18: 7.5 mg; Otherwise No maintenance plan   Next INR check 2/19/2018    Indications   Chronic atrial fibrillation (H) [I48.2]         Your next Anticoagulation Clinic appointment(s)     Feb 19, 2018 11:45 AM CST   Anticoagulation Visit with NB ANTI COAG   [facility]    [address] [place] [id number] [phone]            Feb 22, 2018 10:15 AM CST   Anticoagulation Visit with NB ANTI COAG   [facility])    [address] [place] [address] [phone]              Contact Numbers     Please call [phone] with any problems or questions regarding your therapy.    If you need to cancel and/or reschedule your appointment please call one of the following numbers:  [facility] - [phone]  [place] - [phone]  [facility] [phone]  [place] - [phone]  [place] - [phone]            February 2018 Details    Sun Mon Tue Wed Thu Fri Sat         1               2               3                 4               5               6               7               8               9               10                 11               12               13               14               15      7.5 mg   See details      16      5 mg         17      7.5 mg           18      7.5 mg         19            20               21               22               23               24                 25               26               27               28                   Date Details   02/15 This INR check       Date of next INR:  2/19/2018          How to take your warfarin dose     To take:  5 mg Take 1 of the 5 mg tablets.    To take:  7.5 mg Take 1.5 of the 5 mg tablets.

## 2018-02-15 NOTE — PROGRESS NOTES
ANTICOAGULATION FOLLOW-UP CLINIC VISIT    Patient Name:  Antoine Russo  Date:  2/15/2018  Contact Type:  Face to Face    SUBJECTIVE:     Patient Findings     Positives Initiation of therapy    Comments Patient states he ordered his medical alert bracelet through [facility]           OBJECTIVE    INR Protime   Date Value Ref Range Status   02/15/2018 1.4 (A) 0.86 - 1.14 Final       ASSESSMENT / PLAN  INR assessment SUB    Recheck INR In: 4 DAYS    INR Location Clinic      Anticoagulation Summary as of 2/15/2018     INR goal 2.0-3.0   Today's INR 1.4!   Maintenance plan No maintenance plan   Full instructions 2/15: 7.5 mg; 2/16: 5 mg; 2/17: 7.5 mg; 2/18: 7.5 mg; Otherwise No maintenance plan   Next INR check 2/19/2018   Target end date Indefinite    Indications   Chronic atrial fibrillation (H) [I48.2]         Anticoagulation Episode Summary     INR check location     Preferred lab     Send INR reminders to [facility] CLINIC POOL    Comments *      Anticoagulation Care Providers     Provider Role Specialty Phone number    Troy Long, aKtie Okeefe MD [facility] [phone]            See the Encounter Report to view Anticoagulation Flowsheet and Dosing Calendar (Go to Encounters tab in chart review, and find the Anticoagulation Therapy Visit)    Patient was instructed to take warfarin 5mg Friday and 7.5mg the rest of the days of the week.    Doris Gallardo RN

## 2018-02-19 ENCOUNTER — ANTICOAGULATION THERAPY VISIT (OUTPATIENT)
Dept: ANTICOAGULATION | Facility: CLINIC | Age: 73
End: 2018-02-19
Payer: COMMERCIAL

## 2018-02-19 DIAGNOSIS — I48.20 CHRONIC ATRIAL FIBRILLATION (H): ICD-10-CM

## 2018-02-19 LAB — INR POINT OF CARE: 4.7 (ref 0.86–1.14)

## 2018-02-19 PROCEDURE — 36416 COLLJ CAPILLARY BLOOD SPEC: CPT

## 2018-02-19 PROCEDURE — 85610 PROTHROMBIN TIME: CPT | Mod: QW

## 2018-02-19 PROCEDURE — 99207 ZZC NO CHARGE NURSE ONLY: CPT

## 2018-02-19 NOTE — MR AVS SNAPSHOT
Antoine Russo   2/19/2018 11:45 AM   Anticoagulation Therapy Visit    Description:  72 year old male   Provider:  NB ANTI COAG   Department:  Nb Anticoag           INR as of 2/19/2018     Today's INR 4.7!      Anticoagulation Summary as of 2/19/2018     INR goal 2.0-3.0   Today's INR 4.7!   Full instructions 2/19: Hold; 2/20: 5 mg; 2/21: 5 mg; Otherwise No maintenance plan   Next INR check 2/22/2018    Indications   Chronic atrial fibrillation (H) [I48.2]         Your next Anticoagulation Clinic appointment(s)     Feb 22, 2018 10:15 AM CST   [facility]agulation [facility] with NB ANTI COAG   [facility]    [address] [place] [address] [phone]            Feb 26, 2018 10:30 AM CST   Anticoagulation Visit with NB ANTI COAG   [facility]    [address] [place] [id number] [phone]              Contact Numbers     Please call [phone] with any problems or questions regarding your therapy.    If you need to cancel and/or reschedule your appointment please call one of the following numbers:  [facility] - [phone]  [place] - [phone]  [place] - [phone]  [facility] [phone]  [place] - [phone]            February 2018 Details    Sun Mon Tue Wed Thu Fri Sat         1               2               3                 4               5               6               7               8               9               10                 11               12               13               14               15               16               17                 18               19      Hold   See details      20      5 mg         21      5 mg         22            23               24                 25               26               27               28                   Date Details   02/19 This INR check       Date of next INR:  2/22/2018         How to take your  warfarin dose     To take:  5 mg Take 1 of the 5 mg tablets.    Hold Do not take your warfarin dose. See the Details table to the right for additional instructions.

## 2018-02-19 NOTE — PROGRESS NOTES
ANTICOAGULATION FOLLOW-UP CLINIC VISIT    Patient Name:  Antoine Russo  Date:  2/19/2018  Contact Type:  Face to Face    SUBJECTIVE:     Patient Findings     Positives Inflammation    Comments Patient denies signs and symptoms of bleeding. Patient was educated regarding what to look for and instructed when seek immediate medical attention. Patient verbalized understanding.  Patient states he had an emesis on Sunday after eating some sausages.           OBJECTIVE    INR Protime   Date Value Ref Range Status   02/19/2018 4.7 (A) 0.86 - 1.14 Final       ASSESSMENT / PLAN  INR assessment SUPRA    Recheck INR In: 3 DAYS    INR Location Clinic      Anticoagulation Summary as of 2/19/2018     INR goal 2.0-3.0   Today's INR 4.7!   Maintenance plan No maintenance plan   Full instructions 2/19: Hold; 2/20: 5 mg; 2/21: 5 mg; Otherwise No maintenance plan   Next INR check 2/22/2018   Target end date Indefinite    Indications   Chronic atrial fibrillation (H) [I48.2]         Anticoagulation Episode Summary     INR check location     Preferred lab     Send INR reminders to [facility] [place]    Comments *      Anticoagulation Care Providers     Provider Role Specialty Phone number    Katie Gross MD [facility] [phone]            See the Encounter Report to view Anticoagulation Flowsheet and Dosing Calendar (Go to Encounters tab in chart review, and find the Anticoagulation Therapy Visit)    Patient instructed to hold today the take 5mg daily.    Doris Gallardo RN

## 2018-02-22 ENCOUNTER — ANTICOAGULATION THERAPY VISIT (OUTPATIENT)
Dept: ANTICOAGULATION | Facility: CLINIC | Age: 73
End: 2018-02-22
Payer: COMMERCIAL

## 2018-02-22 DIAGNOSIS — I48.20 CHRONIC ATRIAL FIBRILLATION (H): ICD-10-CM

## 2018-02-22 LAB — INR POINT OF CARE: 6.4 (ref 0.86–1.14)

## 2018-02-22 PROCEDURE — 85610 PROTHROMBIN TIME: CPT | Mod: QW

## 2018-02-22 PROCEDURE — 36416 COLLJ CAPILLARY BLOOD SPEC: CPT

## 2018-02-22 PROCEDURE — 99207 ZZC NO CHARGE NURSE ONLY: CPT

## 2018-02-22 NOTE — PROGRESS NOTES
2/26/18 ADDENDUM  Patient called to update us that he is in the hospital and he will contact us when he is discharged.    ANTICOAGULATION FOLLOW-UP CLINIC VISIT    Patient Name:  Antoine Russo  Date:  2/22/2018  Contact Type:  Face to Face    SUBJECTIVE:     Patient Findings     Comments Patient denies signs and symptoms of bleeding. Patient was educated regarding what to look for and instructed when seek immediate medical attention. Patient verbalized understanding. Patient denies alcohol use.           OBJECTIVE    INR Protime   Date Value Ref Range Status   02/22/2018 6.4 (A) 0.86 - 1.14 Final       ASSESSMENT / PLAN  INR assessment SUPRA    Recheck INR In: 3 DAYS    INR Location Clinic      Anticoagulation Summary as of 2/22/2018     INR goal 2.0-3.0   Today's INR 6.4!   Maintenance plan No maintenance plan   Full instructions 2/22: Hold; 2/23: Hold; 2/24: 5 mg; 2/25: 5 mg; Otherwise No maintenance plan   Next INR check 2/26/2018   Target end date Indefinite    Indications   Chronic atrial fibrillation (H) [I48.2]         Anticoagulation Episode Summary     INR check location     Preferred lab     Send INR reminders to [facility]    Comments *      Anticoagulation Care Providers     Provider Role Specialty Phone number    Katie Gross MD Referring [facility] [phone]            See the Encounter Report to view Anticoagulation Flowsheet and Dosing Calendar (Go to Encounters tab in chart review, and find the Anticoagulation Therapy Visit)    Patient will eat a salad today and hold his warfarin dose for two days. He will take 5mg of warfarin Sat/Sun and recheck his INR Monday.    Doris Gallardo RN

## 2018-02-22 NOTE — MR AVS SNAPSHOT
Antoine Russo   2/22/2018 10:15 AM   Anticoagulation Therapy Visit    Description:  72 year old male   Provider:  NB ANTI COAG   Department:  Nb Anticoag           INR as of 2/22/2018     Today's INR 6.4!      Anticoagulation Summary as of 2/22/2018     INR goal 2.0-3.0   Today's INR 6.4!   Full instructions 2/22: Hold; 2/23: Hold; 2/24: 5 mg; 2/25: 5 mg; Otherwise No maintenance plan   Next INR check 2/26/2018    Indications   Chronic atrial fibrillation (H) [I48.2]         Your next Anticoagulation Clinic appointment(s)     Feb 26, 2018 10:30 AM CST   [facility]agulation [facility] with NB ANTI COAG   [facility]    [address] [place] [address] [phone]              Contact Numbers     Please call [phone] with any problems or questions regarding your therapy.    If you need to cancel and/or reschedule your appointment please call one of the following numbers:  [facility] [phone]  [facility] [phone]  [facility] [phone]  [facility] [phone]  [place] - [phone]            February 2018 Details    Sun Mon Tue Wed Thu Fri Sat         1               2               3                 4               5               6               7               8               9               10                 11               12               13               14               15               16               17                 18               19               20               21               22      Hold   See details      23      Hold         24      5 mg           25      5 mg         26            27               28                   Date Details   02/22 This INR check       Date of next INR:  2/26/2018         How to take your warfarin dose     To take:  5 mg Take 1 of the 5 mg tablets.    Hold Do not take your warfarin dose. See the Details table to the right for additional instructions.

## 2018-02-24 ENCOUNTER — HEALTH MAINTENANCE LETTER (OUTPATIENT)
Age: 73
End: 2018-02-24

## 2018-02-25 ENCOUNTER — HOSPITAL ENCOUNTER (INPATIENT)
Facility: CLINIC | Age: 73
LOS: 4 days | Discharge: HOME | End: 2018-03-01
Attending: FAMILY MEDICINE
Payer: COMMERCIAL

## 2018-02-25 ENCOUNTER — APPOINTMENT (OUTPATIENT)
Dept: ULTRASOUND IMAGING | Facility: CLINIC | Age: 73
End: 2018-02-25
Attending: FAMILY MEDICINE
Payer: COMMERCIAL

## 2018-02-25 ENCOUNTER — APPOINTMENT (OUTPATIENT)
Dept: CT IMAGING | Facility: CLINIC | Age: 73
End: 2018-02-25
Attending: FAMILY MEDICINE
Payer: COMMERCIAL

## 2018-02-25 DIAGNOSIS — E11.42 TYPE 2 DIABETES MELLITUS WITH DIABETIC POLYNEUROPATHY, WITHOUT LONG-TERM CURRENT USE OF INSULIN (H): Chronic | ICD-10-CM

## 2018-02-25 DIAGNOSIS — I48.20 CHRONIC ATRIAL FIBRILLATION (H): Chronic | ICD-10-CM

## 2018-02-25 DIAGNOSIS — Q89.01 SPLEEN ABSENT: Chronic | ICD-10-CM

## 2018-02-25 PROBLEM — K85.20 ALCOHOL-INDUCED ACUTE PANCREATITIS WITHOUT INFECTION OR NECROSIS: Chronic | Status: ACTIVE | Noted: 2018-01-11

## 2018-02-25 LAB
ALBUMIN SERPL-MCNC: 3.8 G/DL (ref 3.4–5)
ALP SERPL-CCNC: 58 U/L (ref 40–150)
ALT SERPL W P-5'-P-CCNC: 29 U/L (ref 0–70)
ANION GAP SERPL CALCULATED.3IONS-SCNC: 8 MMOL/L (ref 3–14)
AST SERPL W P-5'-P-CCNC: 13 U/L (ref 0–45)
BASOPHILS # BLD AUTO: 0 10E9/L (ref 0–0.2)
BASOPHILS NFR BLD AUTO: 0.1 %
BILIRUB SERPL-MCNC: 0.6 MG/DL (ref 0.2–1.3)
BUN SERPL-MCNC: 14 MG/DL (ref 7–30)
CALCIUM SERPL-MCNC: 9.4 MG/DL (ref 8.5–10.1)
CHLORIDE SERPL-SCNC: 99 MMOL/L (ref 94–109)
CO2 SERPL-SCNC: 27 MMOL/L (ref 20–32)
CREAT SERPL-MCNC: 0.91 MG/DL (ref 0.66–1.25)
DIFFERENTIAL METHOD BLD: ABNORMAL
EOSINOPHIL # BLD AUTO: 0 10E9/L (ref 0–0.7)
EOSINOPHIL NFR BLD AUTO: 0 %
ERYTHROCYTE [DISTWIDTH] IN BLOOD BY AUTOMATED COUNT: 14 % (ref 10–15)
GFR SERPL CREATININE-BSD FRML MDRD: 83 ML/MIN/1.7M2
GLUCOSE BLDC GLUCOMTR-MCNC: 128 MG/DL (ref 70–99)
GLUCOSE BLDC GLUCOMTR-MCNC: 161 MG/DL (ref 70–99)
GLUCOSE SERPL-MCNC: 266 MG/DL (ref 70–99)
HCT VFR BLD AUTO: 48.1 % (ref 40–53)
HGB BLD-MCNC: 15.7 G/DL (ref 13.3–17.7)
IMM GRANULOCYTES # BLD: 0 10E9/L (ref 0–0.4)
IMM GRANULOCYTES NFR BLD: 0.2 %
INR PPP: 2.93 (ref 0.86–1.14)
LIPASE SERPL-CCNC: 2270 U/L (ref 73–393)
LYMPHOCYTES # BLD AUTO: 1.4 10E9/L (ref 0.8–5.3)
LYMPHOCYTES NFR BLD AUTO: 8.6 %
MCH RBC QN AUTO: 32 PG (ref 26.5–33)
MCHC RBC AUTO-ENTMCNC: 32.6 G/DL (ref 31.5–36.5)
MCV RBC AUTO: 98 FL (ref 78–100)
MONOCYTES # BLD AUTO: 0.7 10E9/L (ref 0–1.3)
MONOCYTES NFR BLD AUTO: 4.4 %
NEUTROPHILS # BLD AUTO: 13.9 10E9/L (ref 1.6–8.3)
NEUTROPHILS NFR BLD AUTO: 86.7 %
PLATELET # BLD AUTO: 244 10E9/L (ref 150–450)
POTASSIUM SERPL-SCNC: 4.2 MMOL/L (ref 3.4–5.3)
PROT SERPL-MCNC: 8.8 G/DL (ref 6.8–8.8)
RBC # BLD AUTO: 4.91 10E12/L (ref 4.4–5.9)
SODIUM SERPL-SCNC: 134 MMOL/L (ref 133–144)
WBC # BLD AUTO: 16.1 10E9/L (ref 4–11)

## 2018-02-25 PROCEDURE — 83690 ASSAY OF LIPASE: CPT | Performed by: FAMILY MEDICINE

## 2018-02-25 PROCEDURE — 80053 COMPREHEN METABOLIC PANEL: CPT | Performed by: FAMILY MEDICINE

## 2018-02-25 PROCEDURE — 99285 EMERGENCY DEPT VISIT HI MDM: CPT | Mod: 25 | Performed by: FAMILY MEDICINE

## 2018-02-25 PROCEDURE — 25000128 H RX IP 250 OP 636: Performed by: PHYSICIAN ASSISTANT

## 2018-02-25 PROCEDURE — 85025 COMPLETE CBC W/AUTO DIFF WBC: CPT | Performed by: FAMILY MEDICINE

## 2018-02-25 PROCEDURE — 99285 EMERGENCY DEPT VISIT HI MDM: CPT | Mod: 25

## 2018-02-25 PROCEDURE — 25000125 ZZHC RX 250: Performed by: FAMILY MEDICINE

## 2018-02-25 PROCEDURE — 12000000 ZZH R&B MED SURG/OB

## 2018-02-25 PROCEDURE — 99223 1ST HOSP IP/OBS HIGH 75: CPT | Mod: AI | Performed by: PHYSICIAN ASSISTANT

## 2018-02-25 PROCEDURE — 74177 CT ABD & PELVIS W/CONTRAST: CPT

## 2018-02-25 PROCEDURE — 25000128 H RX IP 250 OP 636: Performed by: FAMILY MEDICINE

## 2018-02-25 PROCEDURE — 25000132 ZZH RX MED GY IP 250 OP 250 PS 637

## 2018-02-25 PROCEDURE — 96374 THER/PROPH/DIAG INJ IV PUSH: CPT

## 2018-02-25 PROCEDURE — 25000131 ZZH RX MED GY IP 250 OP 636 PS 637: Performed by: PHYSICIAN ASSISTANT

## 2018-02-25 PROCEDURE — 25000132 ZZH RX MED GY IP 250 OP 250 PS 637: Performed by: PHYSICIAN ASSISTANT

## 2018-02-25 PROCEDURE — 76705 ECHO EXAM OF ABDOMEN: CPT

## 2018-02-25 PROCEDURE — 00000146 ZZHCL STATISTIC GLUCOSE BY METER IP

## 2018-02-25 PROCEDURE — 93010 ELECTROCARDIOGRAM REPORT: CPT | Mod: Z6 | Performed by: FAMILY MEDICINE

## 2018-02-25 PROCEDURE — 96375 TX/PRO/DX INJ NEW DRUG ADDON: CPT

## 2018-02-25 PROCEDURE — 93005 ELECTROCARDIOGRAM TRACING: CPT

## 2018-02-25 PROCEDURE — 85610 PROTHROMBIN TIME: CPT | Performed by: PHYSICIAN ASSISTANT

## 2018-02-25 RX ORDER — TAMSULOSIN HYDROCHLORIDE 0.4 MG/1
0.4 CAPSULE ORAL DAILY
Status: DISCONTINUED | OUTPATIENT
Start: 2018-02-26 | End: 2018-03-01 | Stop reason: HOSPADM

## 2018-02-25 RX ORDER — WARFARIN SODIUM 5 MG/1
5 TABLET ORAL ONCE
Status: COMPLETED | OUTPATIENT
Start: 2018-02-25 | End: 2018-02-25

## 2018-02-25 RX ORDER — LISINOPRIL 10 MG/1
10 TABLET ORAL DAILY
Status: DISCONTINUED | OUTPATIENT
Start: 2018-02-26 | End: 2018-03-01 | Stop reason: HOSPADM

## 2018-02-25 RX ORDER — ONDANSETRON 2 MG/ML
4 INJECTION INTRAMUSCULAR; INTRAVENOUS ONCE
Status: COMPLETED | OUTPATIENT
Start: 2018-02-25 | End: 2018-02-25

## 2018-02-25 RX ORDER — ONDANSETRON 4 MG/1
4 TABLET, ORALLY DISINTEGRATING ORAL EVERY 6 HOURS PRN
Status: DISCONTINUED | OUTPATIENT
Start: 2018-02-25 | End: 2018-03-01 | Stop reason: HOSPADM

## 2018-02-25 RX ORDER — HYDROMORPHONE HYDROCHLORIDE 1 MG/ML
.3-.5 INJECTION, SOLUTION INTRAMUSCULAR; INTRAVENOUS; SUBCUTANEOUS
Status: DISCONTINUED | OUTPATIENT
Start: 2018-02-25 | End: 2018-03-01 | Stop reason: HOSPADM

## 2018-02-25 RX ORDER — NICOTINE POLACRILEX 4 MG
15-30 LOZENGE BUCCAL
Status: DISCONTINUED | OUTPATIENT
Start: 2018-02-25 | End: 2018-03-01 | Stop reason: HOSPADM

## 2018-02-25 RX ORDER — AMOXICILLIN 250 MG
2 CAPSULE ORAL 2 TIMES DAILY PRN
Status: DISCONTINUED | OUTPATIENT
Start: 2018-02-25 | End: 2018-03-01 | Stop reason: HOSPADM

## 2018-02-25 RX ORDER — DEXTROSE MONOHYDRATE 25 G/50ML
25-50 INJECTION, SOLUTION INTRAVENOUS
Status: DISCONTINUED | OUTPATIENT
Start: 2018-02-25 | End: 2018-03-01 | Stop reason: HOSPADM

## 2018-02-25 RX ORDER — SODIUM CHLORIDE, SODIUM LACTATE, POTASSIUM CHLORIDE, CALCIUM CHLORIDE 600; 310; 30; 20 MG/100ML; MG/100ML; MG/100ML; MG/100ML
1000 INJECTION, SOLUTION INTRAVENOUS CONTINUOUS
Status: DISCONTINUED | OUTPATIENT
Start: 2018-02-25 | End: 2018-02-25

## 2018-02-25 RX ORDER — GABAPENTIN 300 MG/1
600 CAPSULE ORAL AT BEDTIME
Status: DISCONTINUED | OUTPATIENT
Start: 2018-02-25 | End: 2018-03-01 | Stop reason: HOSPADM

## 2018-02-25 RX ORDER — SODIUM CHLORIDE, SODIUM LACTATE, POTASSIUM CHLORIDE, CALCIUM CHLORIDE 600; 310; 30; 20 MG/100ML; MG/100ML; MG/100ML; MG/100ML
1000 INJECTION, SOLUTION INTRAVENOUS CONTINUOUS
Status: DISCONTINUED | OUTPATIENT
Start: 2018-02-25 | End: 2018-02-26

## 2018-02-25 RX ORDER — IOPAMIDOL 755 MG/ML
98 INJECTION, SOLUTION INTRAVASCULAR ONCE
Status: COMPLETED | OUTPATIENT
Start: 2018-02-25 | End: 2018-02-25

## 2018-02-25 RX ORDER — PROCHLORPERAZINE MALEATE 5 MG
5 TABLET ORAL EVERY 6 HOURS PRN
Status: DISCONTINUED | OUTPATIENT
Start: 2018-02-25 | End: 2018-03-01 | Stop reason: HOSPADM

## 2018-02-25 RX ORDER — ONDANSETRON 2 MG/ML
4 INJECTION INTRAMUSCULAR; INTRAVENOUS EVERY 6 HOURS PRN
Status: DISCONTINUED | OUTPATIENT
Start: 2018-02-25 | End: 2018-03-01 | Stop reason: HOSPADM

## 2018-02-25 RX ORDER — PROCHLORPERAZINE 25 MG
12.5 SUPPOSITORY, RECTAL RECTAL EVERY 12 HOURS PRN
Status: DISCONTINUED | OUTPATIENT
Start: 2018-02-25 | End: 2018-03-01 | Stop reason: HOSPADM

## 2018-02-25 RX ORDER — HYDROMORPHONE HYDROCHLORIDE 1 MG/ML
0.5 INJECTION, SOLUTION INTRAMUSCULAR; INTRAVENOUS; SUBCUTANEOUS
Status: DISCONTINUED | OUTPATIENT
Start: 2018-02-25 | End: 2018-02-25

## 2018-02-25 RX ORDER — METOPROLOL SUCCINATE 50 MG/1
50 TABLET, EXTENDED RELEASE ORAL 2 TIMES DAILY
Status: DISCONTINUED | OUTPATIENT
Start: 2018-02-25 | End: 2018-03-01 | Stop reason: HOSPADM

## 2018-02-25 RX ORDER — SODIUM CHLORIDE, SODIUM LACTATE, POTASSIUM CHLORIDE, CALCIUM CHLORIDE 600; 310; 30; 20 MG/100ML; MG/100ML; MG/100ML; MG/100ML
INJECTION, SOLUTION INTRAVENOUS CONTINUOUS
Status: DISCONTINUED | OUTPATIENT
Start: 2018-02-25 | End: 2018-02-25

## 2018-02-25 RX ORDER — NALOXONE HYDROCHLORIDE 0.4 MG/ML
.1-.4 INJECTION, SOLUTION INTRAMUSCULAR; INTRAVENOUS; SUBCUTANEOUS
Status: DISCONTINUED | OUTPATIENT
Start: 2018-02-25 | End: 2018-02-25

## 2018-02-25 RX ORDER — AMOXICILLIN 250 MG
1 CAPSULE ORAL 2 TIMES DAILY PRN
Status: DISCONTINUED | OUTPATIENT
Start: 2018-02-25 | End: 2018-03-01 | Stop reason: HOSPADM

## 2018-02-25 RX ORDER — NALOXONE HYDROCHLORIDE 0.4 MG/ML
.1-.4 INJECTION, SOLUTION INTRAMUSCULAR; INTRAVENOUS; SUBCUTANEOUS
Status: DISCONTINUED | OUTPATIENT
Start: 2018-02-25 | End: 2018-03-01 | Stop reason: HOSPADM

## 2018-02-25 RX ADMIN — SODIUM CHLORIDE, POTASSIUM CHLORIDE, SODIUM LACTATE AND CALCIUM CHLORIDE 1000 ML: 600; 310; 30; 20 INJECTION, SOLUTION INTRAVENOUS at 13:19

## 2018-02-25 RX ADMIN — SODIUM CHLORIDE, POTASSIUM CHLORIDE, SODIUM LACTATE AND CALCIUM CHLORIDE 1000 ML: 600; 310; 30; 20 INJECTION, SOLUTION INTRAVENOUS at 22:08

## 2018-02-25 RX ADMIN — INSULIN ASPART 1 UNITS: 100 INJECTION, SOLUTION INTRAVENOUS; SUBCUTANEOUS at 19:14

## 2018-02-25 RX ADMIN — GABAPENTIN 600 MG: 300 CAPSULE ORAL at 22:07

## 2018-02-25 RX ADMIN — SODIUM CHLORIDE, POTASSIUM CHLORIDE, SODIUM LACTATE AND CALCIUM CHLORIDE 1000 ML: 600; 310; 30; 20 INJECTION, SOLUTION INTRAVENOUS at 16:23

## 2018-02-25 RX ADMIN — IOPAMIDOL 98 ML: 755 INJECTION, SOLUTION INTRAVENOUS at 16:54

## 2018-02-25 RX ADMIN — ONDANSETRON 4 MG: 2 INJECTION INTRAMUSCULAR; INTRAVENOUS at 13:20

## 2018-02-25 RX ADMIN — SODIUM CHLORIDE, POTASSIUM CHLORIDE, SODIUM LACTATE AND CALCIUM CHLORIDE: 600; 310; 30; 20 INJECTION, SOLUTION INTRAVENOUS at 18:05

## 2018-02-25 RX ADMIN — SODIUM CHLORIDE 65 ML: 9 INJECTION, SOLUTION INTRAVENOUS at 16:54

## 2018-02-25 RX ADMIN — Medication 0.5 MG: at 18:06

## 2018-02-25 RX ADMIN — METOPROLOL SUCCINATE 50 MG: 50 TABLET, EXTENDED RELEASE ORAL at 19:14

## 2018-02-25 RX ADMIN — Medication 0.5 MG: at 13:22

## 2018-02-25 RX ADMIN — WARFARIN SODIUM 5 MG: 5 TABLET ORAL at 19:14

## 2018-02-25 RX ADMIN — SODIUM CHLORIDE, POTASSIUM CHLORIDE, SODIUM LACTATE AND CALCIUM CHLORIDE 1000 ML: 600; 310; 30; 20 INJECTION, SOLUTION INTRAVENOUS at 19:10

## 2018-02-25 ASSESSMENT — ENCOUNTER SYMPTOMS
BACK PAIN: 1
SINUS PRESSURE: 0
ABDOMINAL PAIN: 1
PALPITATIONS: 0
NAUSEA: 1
VOMITING: 1
FEVER: 0
HEADACHES: 0
WHEEZING: 0
COUGH: 0
SORE THROAT: 0
CONSTIPATION: 0
DIAPHORESIS: 1
FREQUENCY: 0
DIARRHEA: 0
BLOOD IN STOOL: 0
CHILLS: 0
SHORTNESS OF BREATH: 0
DYSURIA: 0

## 2018-02-25 NOTE — IP AVS SNAPSHOT
MRN:[id number]                      After Visit Summary   2/25/2018    Antoine Russo    MRN: [id number]           Thank you!     Thank you for choosing [facility] for your care. Our goal is always to provide you with excellent care. Hearing back from our patients is one way we can continue to improve our services. Please take a few minutes to complete the written survey that you may receive in the mail after you visit with us. Thank you!        Patient Information     Date Of Birth          1945        Designated Caregiver       Most Recent Value    Caregiver    Will someone help with your care after discharge? no [takes care of self, lives with spouse]      About your hospital stay     You were admitted on:  February 25, 2018 You last received care in the:  [facility]    You were discharged on:  March 1, 2018       Who to Call     For medical emergencies, please call 911.  For non-urgent questions about your medical care, please call your primary care provider or clinic, [phone]          Attending Provider     Provider Specialty    Rory Crowder MD [facility], Baldo Pride MD Pulmonary    Katie Wyatt MD Internal Medicine       Primary Care Provider Office Phone # Fax #    Katie Gross [phone]      Follow-up Appointments     Follow-up and recommended labs and tests        Need INR 3/2.  Keep appt next week with MYRA Escobar                  Your next 10 appointments already scheduled     Mar 07, 2018 12:00 PM CST   (Arrive by 11:45 AM)   Return Visit with Yovanny Beasley MD   [facility] ([facility])    [address] [facility] [address] [phone]            Mar 08, 2018 10:45 AM CST   Anticoagulation Visit with NB ANTI COAG   [facility]    [address] [place] [address]    [phone]            Mar 30, 2018  8:00 AM CDT   Office Visit with Katie Gross MD   [facility] ([facility])    [address] [facility] [address] [phone]           Bring a current list of meds and any records pertaining to this visit. For Physicals, please bring immunization records and any forms needing to be filled out. Please arrive 10 minutes early to complete paperwork.              Further instructions from your care team       Warfarin Instructions:  Your INR today is 3.92 Goal INR 2-3  Please hold your warfarin dose today and tomorrow. (Thursday and Friday)  Take 5 mg on Saturday and 2.5 mg on Sunday (half tablet) and recheck your INR on Monday with the Anticoagulation Clinic as you typically do.  Schedule an appointment for this visit [phone]        Warfarin Instruction     You have started taking a medicine called warfarin. This is a blood-thinning medicine (anticoagulant). It helps prevent and treat blood clots.      Before leaving the hospital, make sure you know how much to take and how long to take it.      You will need regular blood tests to make sure your blood is clotting safely. It is very important to see your doctor for regular blood tests.    Talk to your doctor before taking any new medicine (this includes over-the-counter drugs and herbal products). Many medicines can interact with warfarin. This may cause more bleeding or too much clotting.     Eating a lot of vitamin K--found in green, leafy vegetables--can change the way warfarin works in your body. Do NOT avoid these foods. Instead, try to eat the same amount each day.     Bleeding is the most common side-effect of warfarin. You may notice bleeding gums, a bloody nose, bruises and bleeding longer when you cut yourself. See a doctor at once if:   o You cough up blood  o You find blood in your stool (poop)  o You have a deep cut, or a cut that bleeds longer than 10 minutes   o You  "have a bad cut, hard fall, accident or hit your head (go to urgent care or the emergency room).    For women who can get pregnant: This medicine can harm an unborn baby. Be very careful not to get pregnant while taking this medicine. If you think you might be pregnant, call your doctor right away.    For more information, read \"Guide to Warfarin Therapy,  the booklet you received in the hospital.        Pending Results     No orders found from 2/23/2018 to 2/26/2018.            Statement of Approval     Ordered          03/01/18 1001  I have reviewed and agree with all the recommendations and orders detailed in this document.  EFFECTIVE NOW     Approved and electronically signed by:  Katie Wyatt MD             Admission Information     Date & Time Provider Department Dept. Phone    2/25/2018 Katie Wyatt MD [facility] Surgical [phone]      Your Vitals Were     Blood Pressure Pulse Temperature Respirations Height Weight    138/64 68 96.6  F (35.9  C) (Oral) 16 1.778 m (5' 10\") 87.3 kg (192 lb 7.4 oz)    Pulse Oximetry BMI (Body Mass Index)                94% 27.62 kg/m2          MyChart Information     [facility] gives you secure access to your electronic health record. If you see a primary care provider, you can also send messages to your care team and make appointments. If you have questions, please call your primary care clinic.  If you do not have a primary care provider, please call [phone] and they will assist you.        Care EveryWhere ID     This is your Care EveryWhere ID. This could be used by other organizations to access your [facility] [place] medical records  [id number]        Equal Access to Services     [facility] ISABELLE : Hadii stoney burns Sotomy, waaxda luqadaha, qaybta kaalmakoffi isidro. So [facility] [phone].    ATENCIÓN: Si habla español, tiene a snyder disposición servicios gratuitos de asistencia lingüística. Llame al [phone].    We " comply with applicable federal civil rights laws and [place] laws. We do not discriminate on the basis of race, color, national origin, age, disability, sex, sexual orientation, or gender identity.               Review of your medicines      CONTINUE these medicines which may have CHANGED, or have new prescriptions. If we are uncertain of the size of tablets/capsules you have at home, strength may be listed as something that might have changed.        Dose / Directions    gabapentin 300 MG capsule   Commonly known as:  NEURONTIN   This may have changed:    - how much to take  - how to take this  - when to take this  - additional instructions   Used for:  Type 2 diabetes mellitus with diabetic polyneuropathy, without long-term current use of insulin (H)        Take 1 tablet (300 mg) every night for 3 days, then if needed, go to two at HS   Quantity:  60 capsule   Refills:  3       metoprolol succinate 50 MG 24 hr tablet   Commonly known as:  TOPROL-XL   This may have changed:  when to take this   Used for:  Hypertension, benign essential, goal below 140/90        Dose:  50 mg   Take 1 tablet (50 mg) by mouth daily   Quantity:  90 tablet   Refills:  3       multivitamin, therapeutic with minerals Tabs tablet   This may have changed:  when to take this   Used for:  Surgery aftercare        Dose:  1 tablet   Take 1 tablet by mouth daily.   Quantity:  30 each   Refills:  1       warfarin 5 MG tablet   Commonly known as:  COUMADIN   This may have changed:  additional instructions   Used for:  Chronic atrial fibrillation (H)        Dose:  5 mg   Take 1 tablet (5 mg) by mouth daily Please do not take coumadin until directed to by coumadin clinic   Quantity:  30 tablet   Refills:  0         CONTINUE these medicines which have NOT CHANGED        Dose / Directions    glipiZIDE 2.5 MG 24 hr tablet   Commonly known as:  glipiZIDE XL   Used for:  Type 2 diabetes mellitus with diabetic polyneuropathy, without long-term current  use of insulin (H)        Dose:  2.5 mg   Take 1 tablet (2.5 mg) by mouth every morning   Quantity:  90 tablet   Refills:  3       lisinopril 10 MG tablet   Commonly known as:  PRINIVIL/ZESTRIL   Used for:  Persistent proteinuria        Dose:  10 mg   Take 1 tablet (10 mg) by mouth daily   Quantity:  90 tablet   Refills:  1       metFORMIN 500 MG 24 hr tablet   Commonly known as:  GLUCOPHAGE-XR   Used for:  Type 2 diabetes mellitus with diabetic polyneuropathy, without long-term current use of insulin (H)        Dose:  1000 mg   Take 2 tablets (1,000 mg) by mouth 2 times daily (with meals)   Quantity:  360 tablet   Refills:  1       tamsulosin 0.4 MG capsule   Commonly known as:  FLOMAX   Used for:  Urgency of urination, Hypertrophy of prostate without urinary obstruction        Dose:  0.4 mg   Take 1 capsule (0.4 mg) by mouth daily   Quantity:  90 capsule   Refills:  3            Where to get your medicines      These medications were sent to [facility] [address] [place] [address]     Phone:  [phone]     warfarin 5 MG tablet                Protect others around you: Learn how to safely use, store and throw away your medicines at www.disposemymeds.org.             Medication List: This is a list of all your medications and when to take them. Check marks below indicate your daily home schedule. Keep this list as a reference.      Medications           Morning Afternoon Evening Bedtime As Needed    gabapentin 300 MG capsule   Commonly known as:  NEURONTIN   Take 1 tablet (300 mg) every night for 3 days, then if needed, go to two at    Last time this was given:  600 mg on 2/28/2018  7:37 PM                                glipiZIDE 2.5 MG 24 hr tablet   Commonly known as:  glipiZIDE XL   Take 1 tablet (2.5 mg) by mouth every morning                                lisinopril 10 MG tablet   Commonly known as:  PRINIVIL/ZESTRIL   Take 1 tablet (10 mg) by mouth  daily   Last time this was given:  10 mg on 3/1/2018  7:44 AM                                metFORMIN 500 MG 24 hr tablet   Commonly known as:  GLUCOPHAGE-XR   Take 2 tablets (1,000 mg) by mouth 2 times daily (with meals)                                metoprolol succinate 50 MG 24 hr tablet   Commonly known as:  TOPROL-XL   Take 1 tablet (50 mg) by mouth daily   Last time this was given:  50 mg on 3/1/2018  7:44 AM                                multivitamin, therapeutic with minerals Tabs tablet   Take 1 tablet by mouth daily.                                tamsulosin 0.4 MG capsule   Commonly known as:  FLOMAX   Take 1 capsule (0.4 mg) by mouth daily   Last time this was given:  0.4 mg on 3/1/2018  7:44 AM                                warfarin 5 MG tablet   Commonly known as:  COUMADIN   Take 1 tablet (5 mg) by mouth daily Please do not take coumadin until directed to by coumadin clinic   Last time this was given:  4 mg on 2/27/2018  5:38 PM

## 2018-02-25 NOTE — ED PROVIDER NOTES
History     Chief Complaint   Patient presents with     Pancreatitis     hx of pancreatits x 2 last month.  Left abdominal pain started a couple of days ago with n/v.     HPI  Antoine Russo is a 72 year old male who  presents with prior alcohol abuse, and cessation 1/22 with no alcohol use since.  2 admits this year for alcoholic pancreatitis, once at [facility] and once here with CT demonstrating ?pseuodcyst and lipase to 900.       onset 2 days ago - epigastric pain radiating into umbilicus and into mid-back. burning sensation. progressive over the last couple of days.  interactable vomiting this am.  nausea prior to this.   no fever.  No known gallstone history. No hypertriglyceridemia  pending follow-up with SHIRA Kitchen    s/p splenectomy, partial pancreatectomy  no blood in the stool or black stools.  a fib on warfarin    type 2 diabetes, not on insulin      Lab Results   Component Value Date    A1C 8.1 01/23/2018    A1C 7.0 10/10/2017       Lab Results   Component Value Date    TRIG 200 07/13/2017    TRIG 134 07/15/2016       Problem List:    Patient Active Problem List    Diagnosis Date Noted     Anticipated difficulty with intubation 05/23/2017     Priority: High     Class: Chronic     Difficult two hands mask ventilation, intubated multiple times asleep with video laryngoscope. H/o tongue cancer surgery.        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 Please refer if Care Coordination services are needed.    Presenting Problem Signs and Symptoms Treatment Plan   Questions or concerns   during clinic hours   I will call my clinic directly:  [address], [place] [address]  [phone].   Questions or concerns outside clinic hours   I will call the 24 hour nurse line at   [phone] or [facility].   Need to schedule an appointment   I will call the 24 hour scheduling team at [phone] or my clinic directly at [phone].   Same day treatment     I will call my clinic first, nurse line if after hours, urgent care and express care if needed.   Clinic care coordination services (regular clinic hours)   I will call a clinic care coordinator directly:     Shelia Emanuel RN CCM  [phone]    JAY Estes:    [phone]    Or call my clinic at [phone] and ask to speak with care coordination.   Crisis Services: Behavioral or Mental Health  [facility] 24 Hour Phone Line  [phone]    [facility] 24 Hour Crisis Services  [phone]    [facility] [phone]    [facility]   [phone]     Emergency treatment -- Immediately    [facility] 911                Clostridium difficile enterocolitis 12/18/2012     Priority: Medium     Groin fluid collection 12/15/2012     Priority: Medium     CT 12/12- New (since 5/11) collection measuring at least 2.3 cm in diameter and 6.5 cm in length within the right inguinal canal. Bilateral inguinal surgical clips are noted       Colitis 12/13/2012     Priority: Medium     Fecal transplant 12/17/12       Peripheral vascular disease (H) 11/01/2012     Priority: Medium     Problem list name updated by automated process. 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 Gerson Ravi and aristides at the  in 2012  NAD 2017  CT scan of the neck every 2-3 years.  - Thyroid labs yearly.  - Carotid ultrasound in three to four years to evaluate for stenosis.       Colon polyp 08/26/2011     Priority: Medium     Colonoscopy 8/2011-A sessile polyp was found in the cecum. The polyp was 6 mm in size. The polyp was removed with a hot snare. Resection and retrieval were complete. A sessile polyp was found in the proximal transverse colon. The polyp was 15 mm in size. The polyp was removed with a hot snare. Resection and retrieval were complete. A sessile polyp was found in the sigmoid colon. The polyp was 5 mm in size       Advanced directives, counseling/discussion 06/03/2011     Priority: Medium     Hyperlipidemia LDL goal <100 10/31/2010     Priority: Medium     CAD (coronary artery disease) 05/26/2009     Priority: Medium     Stress testing 2009 showed inferior wall ischemia.  Cath preformed; identified moderate CAD with stenosis of 40-50% in LAD and RCA.  No stents were placed.  Echo in 01/2018 (done while in Afib with rates of 100-110); EF of 55-60%.  No RWMA.       GERD (gastroesophageal reflux disease) 05/26/2009     Priority: Medium     Hypertrophy of breast 09/25/2007     Priority: Medium     Diverticulitis of colon 07/17/2007     Priority: Medium     Colitis on CT Scan 5/2011- [place]  Colonoscopy 8/2011 Diverticulitis - Multiple small and large-mouthed diverticula were found in the mid sigmoid colon and at the hepatic flexure. There was narrowing of the colon in association with the diverticular opening. Nena-diverticular erythema was seen. There was evidence of an impacted diverticulum. Purulent discharge was seen in association with the diverticlar  opening. Biopsies were taken with a cold forceps for histology. Multiple small and large-mouthed diverticula were found in the sigmoid colon, in the descending colon, in the transverse colon and in the ascending colon.   Hospitalized diverticulitis 12/12           PERS HX TOBACCO USE - quit in 11/06 with chantix 03/15/2007     Priority: Medium     Hypertension, benign essential, goal below 140/90 11/07/2005     Priority: Medium     Patient has only fair bp control and with family history of diabetes will all ace if lab indicated also has bph and may op for psa and not digital exam next yr        Pain in joint, shoulder region 11/07/2005     Priority: Medium     Hypertrophy of prostate without urinary obstruction 11/07/2005     Priority: Medium     Problem list name updated by automated process. 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 difficile colitis      Colon polyp      Coronary artery disease      Diabetes mellitus (H)      Diverticulitis      Hypertension      Malignant neoplasm (H)      Noninfectious ileitis        Past Surgical History:    Past Surgical History:   Procedure Laterality Date     BREAST SURGERY  2008    right breast mass benign     COLONOSCOPY      multiple polyps removed     COLONOSCOPY  8/24/2011    Procedure:COMBINED COLONOSCOPY, REMOVE TUMOR/POLYP/LESION BY SNARE; Surgeon:MILEY ARBOLEDA; Location:[facility]     COLONOSCOPY  12/17/2012    Procedure: COLONOSCOPY;;  Surgeon: Leon Maurer MD;  Location:  GI     COLONOSCOPY  12/18/2012    Procedure: COLONOSCOPY;;  Surgeon: Leon Maurer MD;  Location:  GI     DENERVATION OF SPERMATIC CORD MICROSURGICAL Left 5/23/2017    Procedure: DENERVATION OF SPERMATIC CORD MICROSURGICAL;;  Surgeon: Marcio Aggarwal MD;  [facility]     DISSECTION RADICAL NECK BILATERAL  8/2/2012    Procedure: DISSECTION RADICAL  NECK BILATERAL;;  Surgeon: Yung Alvares MD;  [facility]     ENDOSCOPIC RETROGRADE CHOLANGIOPANCREATOGRAM N/A 5/10/2016    Procedure: COMBINED ENDOSCOPIC RETROGRADE CHOLANGIOPANCREATOGRAPHY, PLACE TUBE/STENT;  Surgeon: Yovanny Beasley MD;  [facility]     ENDOSCOPIC ULTRASOUND UPPER GASTROINTESTINAL TRACT (GI) N/A 2/3/2016    Procedure: ENDOSCOPIC ULTRASOUND, ESOPHAGOSCOPY / UPPER GASTROINTESTINAL TRACT (GI);  Surgeon: Grabiel Plata MD;  [facility]     ESOPHAGOSCOPY, GASTROSCOPY, DUODENOSCOPY (EGD), COMBINED N/A 2/3/2016    Procedure: COMBINED ENDOSCOPIC ULTRASOUND, ESOPHAGOSCOPY, GASTROSCOPY, DUODENOSCOPY (EGD), FINE NEEDLE ASPIRATE/BIOPSY;  Surgeon: Grabiel Plata MD;  [facility]     ESOPHAGOSCOPY, GASTROSCOPY, DUODENOSCOPY (EGD), COMBINED N/A 6/8/2016    Procedure: COMBINED ESOPHAGOSCOPY, GASTROSCOPY, DUODENOSCOPY (EGD), REMOVE FOREIGN BODY;  Surgeon: Yovanny Beasley MD;  [facility]     EXCISE LESION INTRAORAL  6/14/2012    Procedure: EXCISE LESION INTRAORAL;  Wide Local Excision Floor of Mouth, Direct Laryngoscopy, Bilateral [place]'s Marsuplization, Split Thickness Skin Graft from right Thigh  Latex Safe;  Surgeon: Gerson Ravi MD;  [facility]     EXCISE LESION INTRAORAL  8/2/2012    Procedure: EXCISE LESION INTRAORAL;  Floor of Mouth Resection, Bilateral Selective Radical Neck Dissection, Tracheostomy, Left Radial Forearm  Free Flap with Alloderm, Nasogastric Feeding Tube Placement,    * Latex Safe*;  Surgeon: Gerson Ravi MD;  [facility]     EXCISE LESION INTRAORAL  12/11/2012    Procedure: EXCISE LESION INTRAORAL;  takedown of oral flap;  Surgeon: Yung Alvares MD;  [facility]     GRAFT FREE VASCULARIZED (LOCATION)  8/2/2012    Procedure: GRAFT FREE VASCULARIZED (LOCATION);;  Surgeon: Yung Alvares MD;  [facility]     GRAFT SKIN SPLIT THICKNESS FROM EXTREMITY  6/14/2012    Procedure: GRAFT SKIN SPLIT THICKNESS FROM  EXTREMITY;;  Surgeon: Gerson Ravi MD;  [facility]     LAPAROSCOPIC ILEOSTOMY TAKEDOWN  2013    Procedure: LAPAROSCOPIC ILEOSTOMY TAKEDOWN;  Laparoscopic Closure of Enterostomy, Guerda's Type with IleoRectal Anastomosis ;  Surgeon: Grabiel Riddle MD;  [facility]     LAPAROTOMY EXPLORATORY  2012    Procedure: LAPAROTOMY EXPLORATORY;  Exploratory Laparotomy, total abdominal colectomy, ileostomy formation;  Surgeon: Miquel Cannon MD;  [facility]     LARYNGOSCOPY  2012    Procedure: LARYNGOSCOPY;;  Surgeon: Gerson Ravi MD;  [facility]     ORTHOPEDIC SURGERY      ganglian cyst left ankle     PANCREATECTOMY, SPLENECTOMY N/A 3/10/2016    Procedure: PANCREATECTOMY, SPLENECTOMY;  Surgeon: Nael Abel MD;  [facility]     SHOULDER SURGERY  , 2008- right rotator cuff,  bone spur on left. Dr. Hdez     VARICOCELECTOMY Left 2017    Procedure: VARICOCELECTOMY;  Left Varicocele Repair, Denervation of Left Testis;  Surgeon: Marcio Aggarwal MD;  [facility]       Family History:    Family History   Problem Relation Age of Onset     DIABETES Sister      onset age 50     Alzheimer Disease Mother       80     Alzheimer Disease Father       85     DIABETES Other      nephew type 1     DIABETES Other      Anesthesia Reaction No family hx of      Colon Cancer No family hx of      Colon Polyps No family hx of      Crohn Disease No family hx of      Ulcerative Colitis No family hx of        Social History:  Marital Status:   [2]  Social History   Substance Use Topics     Smoking status: Former Smoker     Packs/day: 1.00     Years: 40.00     Types: Cigarettes     Quit date: 2006     Smokeless tobacco: Never Used     Alcohol use Yes      Comment: 6-12 beers/daily for at least 40 years; now 3-4 beers daily now        Medications:      warfarin (COUMADIN) 5 MG tablet   metFORMIN (GLUCOPHAGE-XR) 500 MG 24 hr tablet   tamsulosin (FLOMAX)  "0.4 MG capsule   lisinopril (PRINIVIL/ZESTRIL) 10 MG tablet   glipiZIDE (GLIPIZIDE XL) 2.5 MG 24 hr tablet   metoprolol (TOPROL-XL) 50 MG 24 hr tablet   gabapentin (NEURONTIN) 300 MG capsule   multivitamin, therapeutic with minerals (THERA-VIT-M) TABS         Review of Systems   Constitutional: Positive for diaphoresis. Negative for chills and fever.   HENT: Negative for ear pain, sinus pressure and sore throat.    Eyes: Negative for visual disturbance.   Respiratory: Negative for cough, shortness of breath and wheezing.    Cardiovascular: Negative for chest pain and palpitations.   Gastrointestinal: Positive for abdominal pain, nausea and vomiting. Negative for blood in stool, constipation and diarrhea.   Genitourinary: Negative for dysuria, frequency and urgency.   Musculoskeletal: Positive for back pain.   Skin: Negative for rash.   Neurological: Negative for headaches.   All other systems reviewed and are negative.      Physical Exam   BP: (!) 178/106  Heart Rate: 100  Temp: 97.8  F (36.6  C)  Resp: 18  Height: 177.8 cm (5' 10\")  Weight: 90.7 kg (200 lb)  SpO2: 94 %    Physical Exam   Constitutional: He appears distressed.   HENT:   Mouth/Throat: No oropharyngeal exudate.   Eyes: Conjunctivae are normal.   Neck: Neck supple.   Cardiovascular: Normal rate and regular rhythm.  Exam reveals no gallop and no friction rub.    No murmur heard.  Pulmonary/Chest: Effort normal and breath sounds normal. No respiratory distress. He has no wheezes. He has no rales.   Abdominal: Soft. Bowel sounds are normal. He exhibits no distension. There is tenderness in the epigastric area. There is no rebound and no guarding.   Musculoskeletal: He exhibits no edema.   Neurological: He is alert. He exhibits normal muscle tone.   Skin: No rash noted. He is not diaphoretic.       ED Course     ED Course     Procedures                  EKG Interpretation:      Interpreted by Rory Crowder MD  EKG done at 1556 hrs. demonstrates a sinus " rhythm at 104 bpm with a normal axis and no ST change.  No T-wave changes.  There may be a minimal J-point depression in lateral leads.  Poor R progression V1 through V4.  No Q waves.  Normal intervals except borderline AK interval of 216..  Normal conduction.  No ectopy.  Impression sinus tachycardia 104 bpm with a normal axis and no significant acute ST or T-wave changes.  Borderline first-degree AV block  No significant change from EKG done February 5, 2018 with the exception of the rate     Critical Care time:  none             Results for orders placed or performed during the hospital encounter of 02/25/18   Abdomen US, limited (RUQ only)    Narrative    RIGHT UPPER QUADRANT ULTRASOUND 2/25/2018 1:57 PM    HISTORY:  Pancreatitis despite alcohol cessation.    COMPARISON: 1/23/2018 CT abdomen and pelvis.    FINDINGS:    Gallbladder:  Normal with no cholelithiasis, wall thickening or focal  tenderness.      Bile ducts:   CHD is normal diameter.  No intrahepatic biliary  dilatation.    Liver:  16.5 cm in length. Mild fatty infiltration. No focal liver  lesions identified.    Pancreas:  Pancreas is partially obscured. There is a hypoechoic 4.6 x  2.7 x 2.9 cm area in the region of the pancreatic tail which is  indeterminate, could be an area of complex fluid or focal edema.    Right kidney:  13.5 cm in length. No hydronephrosis. There may be a  focal area of cortical thinning or scarring at the lateral mid kidney  and 1.4 cm superior right renal cyst, 4 cm cyst inferior right kidney.      Impression    IMPRESSION:    1. Normal gallbladder, no gallstones.  2. Hypoechoic area near the pancreatic tail which is indeterminate.  The 1/23/2018 CT demonstrated postoperative changes consistent with  distal pancreatectomy, and this hypoechoic area could represent the  low attenuation area on CT adjacent to the postsurgical change,  although nonspecific.  3. Right renal cysts.      HARSHAL ISBELL MD   Comprehensive metabolic  panel   Result Value Ref Range    Sodium 134 133 - 144 mmol/L    Potassium 4.2 3.4 - 5.3 mmol/L    Chloride 99 94 - 109 mmol/L    Carbon Dioxide 27 20 - 32 mmol/L    Anion Gap 8 3 - 14 mmol/L    Glucose 266 (H) 70 - 99 mg/dL    Urea Nitrogen 14 7 - 30 mg/dL    Creatinine 0.91 0.66 - 1.25 mg/dL    GFR Estimate 83 >60 mL/min/1.7m2    GFR Estimate If Black >90 >60 mL/min/1.7m2    Calcium 9.4 8.5 - 10.1 mg/dL    Bilirubin Total 0.6 0.2 - 1.3 mg/dL    Albumin 3.8 3.4 - 5.0 g/dL    Protein Total 8.8 6.8 - 8.8 g/dL    Alkaline Phosphatase 58 40 - 150 U/L    ALT 29 0 - 70 U/L    AST 13 0 - 45 U/L   CBC with platelets, differential   Result Value Ref Range    WBC 16.1 (H) 4.0 - 11.0 10e9/L    RBC Count 4.91 4.4 - 5.9 10e12/L    Hemoglobin 15.7 13.3 - 17.7 g/dL    Hematocrit 48.1 40.0 - 53.0 %    MCV 98 78 - 100 fl    MCH 32.0 26.5 - 33.0 pg    MCHC 32.6 31.5 - 36.5 g/dL    RDW 14.0 10.0 - 15.0 %    Platelet Count 244 150 - 450 10e9/L    Diff Method Automated Method     % Neutrophils 86.7 %    % Lymphocytes 8.6 %    % Monocytes 4.4 %    % Eosinophils 0.0 %    % Basophils 0.1 %    % Immature Granulocytes 0.2 %    Absolute Neutrophil 13.9 (H) 1.6 - 8.3 10e9/L    Absolute Lymphocytes 1.4 0.8 - 5.3 10e9/L    Absolute Monocytes 0.7 0.0 - 1.3 10e9/L    Absolute Eosinophils 0.0 0.0 - 0.7 10e9/L    Absolute Basophils 0.0 0.0 - 0.2 10e9/L    Abs Immature Granulocytes 0.0 0 - 0.4 10e9/L   Lipase   Result Value Ref Range    Lipase 2270 (H) 73 - 393 U/L   INR   Result Value Ref Range    INR 2.93 (H) 0.86 - 1.14         Assessments & Plan (with Medical Decision Making)     MDM: Antoine LOPEZ Rafaela is a 72 year old male who presented with a history of 2 episodes of pancreatitis in January and February both alcohol induced is now not used alcohol since January 22 and presents with recurrent episode of epigastric abdominal pain radiating to his back with nausea and intractable vomiting this morning progressive symptoms over the last 48 hours.   Ultrasound demonstrates no gallstones and CT abdomen pelvis still pending at the time of this dictation.  His prior triglycerides have been normal and his A1c was 8.  His lipase is over 2000.  Is given LR in the emergency department.  Zofran Dilaudid.  Discussed with Agnes Paris hsopitalist  regarding admission and she accepts.  Patient also has a history of paroxysmal/chronic A. fib but is in sinus rhythm at this time.  His INR which was recently elevated to over 4 is now less than 3 and this is for atrial fibrillation.  His LFTs and other labs are reassuring.  He is status post splenectomy and has a white count of approximately 16,000 but no signs of infection..    I have reviewed the nursing notes.    I have reviewed the findings, diagnosis, plan and need for follow up with the patient.       ED to Inpatient Handoff:    Discussed with Agnes Paris   Patient accepted for Inpatient Stay  Pending studies include ct abd  Code Status: Full Code           New Prescriptions    No medications on file       Final diagnoses:   Pancreatitis, recurrent (H)   Spleen absent   Type 2 diabetes mellitus with diabetic polyneuropathy, without long-term current use of insulin (H)   Chronic atrial fibrillation (H)       2/25/2018   [facility] EMERGENCY DEPARTMENT     Rory Crowder MD  02/25/18 [id number]

## 2018-02-25 NOTE — IP AVS SNAPSHOT
[facility]    [address] [place] [address]    Phone:  [phone]    Fax:  [phone]                                       After Visit Summary   2/25/2018    Antoine Russo    MRN: [id number]           After Visit Summary Signature Page     I have received my discharge instructions, and my questions have been answered. I have discussed any challenges I see with this plan with the nurse or doctor.    ..........................................................................................................................................  Patient/Patient Representative Signature      ..........................................................................................................................................  Patient Representative Print Name and Relationship to Patient    ..................................................               ................................................  Date                                            Time    ..........................................................................................................................................  Reviewed by Signature/Title    ...................................................              ..............................................  Date                                                            Time

## 2018-02-25 NOTE — H&P
"[facility]    History and Physical  Hospital Medicine       Date of Admission:  2/25/2018  Date of Service: 2/25/2018     Primary Care Physician   Katie Gross [phone]    Assessment & Plan   Antoine Russo is a 72 year old male with PMH significant for CAD, chronic Afib, GERD, hx of pancreatic cancer s/p distal pancreatectomy and splenectomy, GERD, hx of partial colectomy for treatment of diverticulitis, HLD, HTN, BPH, PVD and T2DM who now presents with epigastric and LUQ abdominal pain.         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  Continued to drink post discharge. Admitted 01/22-01/23 with similar episode; was drinking 2 beers daily.  Reports total abstinence since that point in time.  VS today reveal hypertension, slight tachycardia.  CMP grossly normal.  Lipase 2270. CT abd/pelvis shows: \"peripancreatic edema, consistent with Pancreatitis. may represent a pseudocyst and measures 1.8 cm compared with 1.5 cm previously.\"  - NPO with IV fluid (250cc/hr)  - continue outpatient follow up with Dr. Beasley; 03/07/2018  - pain control with IV dilaudid  - lipase in AM     Leukocytosis  Hx of Splenectomy 2016  Previously cyst of the pancreas in 2016; had spleen taken out at the same time. WBC 16.1 on arrival.  May be stress induced in the setting of splenectomy, however in the presence of pseudocyst this will need to be monitored closely.  No evidence of peritoneal signs on physical exam. No antibiotics given at this point in time.  - CBC in AM    Previous Alcohol Dependence  Previously drinking between 32-48 oz of beer daily with other pancreatitis exacerbation.  Reports no alcohol use in the last month. On admission, slight HTN and tachycardia. Not " tremulous     Hypertension, benign essential, goal below 140/90  BP reviewed, slightly elevated  - continue PTA lisinopril, metoprolol     CAD (coronary artery disease)  Peripheral vascular disease (H)  Stress testing 2009 showed inferior wall ischemia.  Cath preformed; identified moderate CAD with stenosis of 40-50% in LAD and RCA.  No stents were placed.  Echo in 01/2018 (done while in Afib with rates of 100-110); EF of 55-60%.  No RWMA.     Atrial Fibrillation  EKG on admission shows Afib.  Rate slightly elevated, however not true RVR  - continue PTA coumadin     GERD (gastroesophageal reflux disease)  Does not use medications as an outpatient  - will give once daily IV pantoprazole for GI protection while admitted      Hyperlipidemia LDL goal <100  Not on medication as an outpatient        Type 2 diabetes mellitus with diabetic polyneuropathy, without long-term current use of insulin (H)  - hold PTA metformin/glipizide given above pancreatitis, NPO status  - high SSI ordered with hypo/hyperglycemia protocol; will use NPO protocol     H/O Hemicolectomy due to megacolon as a result of C.diff colitis     Hx of C.diff colitis s/p fecal transplant     Hx of squamous cell carcinoma of the mouth  Follows at the [facility].  S/p resection with Dr. Gerson Ravi in 2012        F: 250cc/hr of LR  E: BMP in AM  N: NPO  DVT Prophylaxis: coumadin    Code Status: Full Code    Disposition: Anticipate discharge in 2-3 days. Appropriate for inpatient care    Case discussed with Dr. Baldo Ashford.  Assessment and plan as written above.    Agnes Paris PA-C  [facility]    History is obtained from the patient and review of the EMR.    Past Medical History    Past Medical History:   Diagnosis Date     C. 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      Noninfectious ileitis        Past Surgical History   Past Surgical History:   Procedure Laterality Date     BREAST SURGERY  2008    right breast mass benign     COLONOSCOPY      multiple polyps removed     COLONOSCOPY  8/24/2011    Procedure:COMBINED COLONOSCOPY, REMOVE TUMOR/POLYP/LESION BY SNARE; Surgeon:MILEY ARBOLEDA; Location:[facility]     COLONOSCOPY  12/17/2012    Procedure: COLONOSCOPY;;  Surgeon: Leon Maurer MD;  [facility]: [facility]     COLONOSCOPY  12/18/2012    Procedure: COLONOSCOPY;;  Surgeon: Leon Maurer MD;  [facility]     DENERVATION OF SPERMATIC CORD MICROSURGICAL Left 5/23/2017    Procedure: DENERVATION OF SPERMATIC CORD MICROSURGICAL;;  Surgeon: Marcio Aggarwal MD;  [facility]     DISSECTION RADICAL NECK BILATERAL  8/2/2012    Procedure: DISSECTION RADICAL NECK BILATERAL;;  Surgeon: Yung Alvares MD;  [facility]     ENDOSCOPIC RETROGRADE CHOLANGIOPANCREATOGRAM N/A 5/10/2016    Procedure: COMBINED ENDOSCOPIC RETROGRADE CHOLANGIOPANCREATOGRAPHY, PLACE TUBE/STENT;  Surgeon: Yovanny Beasley MD;  [facility]     ENDOSCOPIC ULTRASOUND UPPER GASTROINTESTINAL TRACT (GI) N/A 2/3/2016    Procedure: ENDOSCOPIC ULTRASOUND, ESOPHAGOSCOPY / UPPER GASTROINTESTINAL TRACT (GI);  Surgeon: Grabiel Plata MD;  [facility]     ESOPHAGOSCOPY, GASTROSCOPY, DUODENOSCOPY (EGD), COMBINED N/A 2/3/2016    Procedure: COMBINED ENDOSCOPIC ULTRASOUND, ESOPHAGOSCOPY, GASTROSCOPY, DUODENOSCOPY (EGD), FINE NEEDLE ASPIRATE/BIOPSY;  Surgeon: Grabiel Plata MD;  [facility]     ESOPHAGOSCOPY, GASTROSCOPY, DUODENOSCOPY (EGD), COMBINED N/A 6/8/2016    Procedure: COMBINED ESOPHAGOSCOPY, GASTROSCOPY, DUODENOSCOPY (EGD), REMOVE FOREIGN BODY;  Surgeon: Yovanny Beasley MD;  [facility]     EXCISE LESION INTRAORAL  6/14/2012    Procedure: EXCISE LESION INTRAORAL;  Wide Local Excision Floor of Mouth, Direct Laryngoscopy, Bilateral [place]'s Marsuplization,  Split Thickness Skin Graft from right Thigh  Latex Safe;  Surgeon: Gerson Ravi MD;  [facility]     EXCISE LESION INTRAORAL  8/2/2012    Procedure: EXCISE LESION INTRAORAL;  Floor of Mouth Resection, Bilateral Selective Radical Neck Dissection, Tracheostomy, Left Radial Forearm  Free Flap with Alloderm, Nasogastric Feeding Tube Placement,    * Latex Safe*;  Surgeon: Gerson Ravi MD;  [facility]     EXCISE LESION INTRAORAL  12/11/2012    Procedure: EXCISE LESION INTRAORAL;  takedown of oral flap;  Surgeon: Yung Alvares MD;  [facility]     GRAFT FREE VASCULARIZED (LOCATION)  8/2/2012    Procedure: GRAFT FREE VASCULARIZED (LOCATION);;  Surgeon: Yung Alvares MD;  [facility]     GRAFT SKIN SPLIT THICKNESS FROM EXTREMITY  6/14/2012    Procedure: GRAFT SKIN SPLIT THICKNESS FROM EXTREMITY;;  Surgeon: Gerson Ravi MD;  [facility]     LAPAROSCOPIC ILEOSTOMY TAKEDOWN  6/6/2013    Procedure: LAPAROSCOPIC ILEOSTOMY TAKEDOWN;  Laparoscopic Closure of Enterostomy, Guerda's Type with IleoRectal Anastomosis ;  Surgeon: Grabiel Riddle MD;  [facility]     LAPAROTOMY EXPLORATORY  12/20/2012    Procedure: LAPAROTOMY EXPLORATORY;  Exploratory Laparotomy, total abdominal colectomy, ileostomy formation;  Surgeon: Miquel Cannon MD;  [facility]     LARYNGOSCOPY  6/14/2012    Procedure: LARYNGOSCOPY;;  Surgeon: Gerson Ravi MD;  [facility]     ORTHOPEDIC SURGERY      ganglian cyst left ankle     PANCREATECTOMY, SPLENECTOMY N/A 3/10/2016    Procedure: PANCREATECTOMY, SPLENECTOMY;  Surgeon: Nael Abel MD;  [facility]     SHOULDER SURGERY  2006, 2008    2006- right rotator cuff, 2008 bone spur on left. Dr. Hdez     VARICOCELECTOMY Left 5/23/2017    Procedure: VARICOCELECTOMY;  Left Varicocele Repair, Denervation of Left Testis;  Surgeon: Marcio Aggarwal MD;  [facility]       Family History    Family History   Problem Relation Age of Onset     DIABETES Sister   "    onset age 50     Alzheimer Disease Mother       80     Alzheimer Disease Father       85     DIABETES Other      nephew type 1     DIABETES Other      Anesthesia Reaction No family hx of      Colon Cancer No family hx of      Colon Polyps No family hx of      Crohn Disease No family hx of      Ulcerative Colitis No family hx of        History of Present Illness   Antoine Russo is a 72 year old male with PMH significant for CAD, chronic Afib, GERD, hx of pancreatic cancer s/p distal pancreatectomy and splenectomy, GERD, hx of partial colectomy for treatment of diverticulitis, HLD, HTN, BPH, PVD and T2DM who now presents with epigastric and LUQ abdominal pain.    Three or four days ago, the patient began to ex[experience pain of the epigastrium and LUQ. Pain was initially a 2/10. Reports that he did not change his diet nor ADL in the attempts to manage/control pain to this writer. Today, this was a 10/10. Nothing made pain better or worse. Slight radiation to the back. Reports that it feels as though someone \"kicks\" him.  Developed emesis this morning. Continues to pass gas and stool.  Feels similar to other episodes of pancreatitis.  Has follow up with Dr. Beasley on the . He denies recent black/bloody stools    CT  Consistent with pancreatitis.  Pian improved with dilaudid, but not resolved.    ROS: Denies fever, chills, nausea, vomiting, myalgias, cold-like symptoms (congestion, pharyngitis, sinus pressure, rhinorrhea), swollen glands, headaches, lightheadedness, dizziness, chest pain, palpitations/flutters, SOB, cough, wheezes, abdominal pain, diarrhea/constipation, dysuria, hematuria, joint swelling, joint pain, leg swelling, and rashes.      Prior to Admission Medications   Prior to Admission Medications   Prescriptions Last Dose Informant Patient Reported? Taking?   gabapentin (NEURONTIN) 300 MG capsule 2018 at hs Self No Yes   Sig: Take 1 tablet (300 mg) every night for 3 days, then if " needed, go to two at HS   Patient taking differently: Take 600 mg by mouth At Bedtime Take 1 tablet (300 mg) every night for 3 days, then if needed, go to two at HS   glipiZIDE (GLIPIZIDE XL) 2.5 MG 24 hr tablet 2/24/2018 at am Self No Yes   Sig: Take 1 tablet (2.5 mg) by mouth every morning   lisinopril (PRINIVIL/ZESTRIL) 10 MG tablet 2/24/2018 at am Self No Yes   Sig: Take 1 tablet (10 mg) by mouth daily   metFORMIN (GLUCOPHAGE-XR) 500 MG 24 hr tablet 2/24/2018 at am Self No Yes   Sig: Take 2 tablets (1,000 mg) by mouth 2 times daily (with meals)   metoprolol (TOPROL-XL) 50 MG 24 hr tablet 2/24/2018 at pm Self No Yes   Sig: Take 1 tablet (50 mg) by mouth daily   Patient taking differently: Take 50 mg by mouth 2 times daily    multivitamin, therapeutic with minerals (THERA-VIT-M) TABS 2/24/2018 at am Self No Yes   Sig: Take 1 tablet by mouth daily.   Patient taking differently: Take 1 tablet by mouth every morning    tamsulosin (FLOMAX) 0.4 MG capsule 2/24/2018 at am Self No Yes   Sig: Take 1 capsule (0.4 mg) by mouth daily   warfarin (COUMADIN) 5 MG tablet 2/24/2018 at pm Self No Yes   Sig: Take 1 tablet (5 mg) by mouth daily Take 5mg daily or As directed by Anticoagulation Clinic      Facility-Administered Medications: None       Allergies   Allergies   Allergen Reactions     Nkda [No Known Drug Allergies]        Social History   Social History     Social History     Marital status:      Spouse name: N/A     Number of children: N/A     Years of education: N/A     Occupational History     [employer]      20 hr/week monday-Thur 7-noon     [place]  [employer]     RETIRED      Social History Main Topics     Smoking status: Former Smoker     Packs/day: 1.00     Years: 40.00     Types: Cigarettes     Quit date: 11/24/2006     Smokeless tobacco: Never Used     Alcohol use Yes      Comment: 6-12 beers/daily for at least 40 years; now 3-4 beers daily now     Drug use: No      "Sexual activity: Yes     Partners: Female     Other Topics Concern      Service Yes     [employer] 6 years     Blood Transfusions No     Caffeine Concern Yes     0-2 cups     Occupational Exposure No     retired     Hobby Hazards No     Sleep Concern No     Stress Concern No     Weight Concern No     Special Diet Yes     healthy     Back Care No     Exercise Yes     Bike Helmet Not Asked     N/A     Seat Belt Yes     Self-Exams Yes     Parent/Sibling W/ Cabg, Mi Or Angioplasty Before 65f 55m? No     Social History Narrative    Son lives in [place] with 2 grandchildren 19 and 6       Physical Exam     BP (!) 172/96  Temp 97.8  F (36.6  C) (Temporal)  Resp 18  Ht 1.778 m (5' 10\")  Wt 90.7 kg (200 lb)  SpO2 94%  BMI 28.7 kg/m2     Weight: 200 lbs 0 oz Body mass index is 28.7 kg/(m^2).     # Pain Assessment:   Current Pain Score 2/25/2018 1/23/2018 1/22/2018   Patient currently in pain? - denies -   Pain score (0-10) 8 - 10   Pain location - - -   Pain descriptors - - -   - Antoine is experiencing pain due to pancreatitis. Pain management was discussed and the plan was created in a collaborative fashion.  Antoine's response to the current recommendations: compliant  - Opioid regimen: 0.3-0.5mg dilaudid Q2 PRN  - Response to opioid medications: Reduction of symptoms   - Bowel regimen: senna          Constitutional: Alert and oriented x4.  Cooperative.  Appears stated age.  Appears in no acute distress.   HEENT: Oropharynx is clear and moist. No evidence of cranial trauma.  Lymph/Hematologic: No occipital, submental, submandibular, anterior or posterior cervical, or supraclavicular lymphadenopathy is appreciated.  Cardiovascular: Regular rate/ rhythm.  S1 and S2 grossly normal.  No appreciable murmur, rub, gallop. No lower extremity edema.  Respiratory: Clear to auscultation bilaterally. Equal chest expansion.  GI: Tender to palpation of the episgarum and LUQ with deep palpation.  No appreciable rebound " tenderness.  Abdomen is otherwise soft, non-tender, normal bowel sounds, no hepatosplenomegaly.  Genitourinary: Deferred  Musculoskeletal: Normal muscle bulk and tone.  Skin: Warm and dry, no rashes.   Neurologic: Neck supple. Cranial nerves 3-12 are grossly intact.  is symmetric.     Data   Data reviewed today:     Recent Labs  Lab 02/25/18  1140 02/22/18 02/19/18   WBC 16.1*  --   --    HGB 15.7  --   --    MCV 98  --   --      --   --    INR  --  6.4* 4.7*     --   --    POTASSIUM 4.2  --   --    CHLORIDE 99  --   --    CO2 27  --   --    BUN 14  --   --    CR 0.91  --   --    ANIONGAP 8  --   --    NILSON 9.4  --   --    *  --   --    ALBUMIN 3.8  --   --    PROTTOTAL 8.8  --   --    BILITOTAL 0.6  --   --    ALKPHOS 58  --   --    ALT 29  --   --    AST 13  --   --    LIPASE 2270*  --   --        Recent Results (from the past 24 hour(s))   Abdomen US, limited (RUQ only)    Narrative    RIGHT UPPER QUADRANT ULTRASOUND 2/25/2018 1:57 PM    HISTORY:  Pancreatitis despite alcohol cessation.    COMPARISON: 1/23/2018 CT abdomen and pelvis.    FINDINGS:    Gallbladder:  Normal with no cholelithiasis, wall thickening or focal  tenderness.      Bile ducts:   CHD is normal diameter.  No intrahepatic biliary  dilatation.    Liver:  16.5 cm in length. Mild fatty infiltration. No focal liver  lesions identified.    Pancreas:  Pancreas is partially obscured. There is a hypoechoic 4.6 x  2.7 x 2.9 cm area in the region of the pancreatic tail which is  indeterminate, could be an area of complex fluid or focal edema.    Right kidney:  13.5 cm in length. No hydronephrosis. There may be a  focal area of cortical thinning or scarring at the lateral mid kidney  and 1.4 cm superior right renal cyst, 4 cm cyst inferior right kidney.      Impression    IMPRESSION:    1. Normal gallbladder, no gallstones.  2. Hypoechoic area near the pancreatic tail which is indeterminate.  The 1/23/2018 CT demonstrated  postoperative changes consistent with  distal pancreatectomy, and this hypoechoic area could represent the  low attenuation area on CT adjacent to the postsurgical change,  although nonspecific.  3. Right renal cysts.      HARSHAL ISBELL MD       I personally reviewed no images or EKG's today.    Agnes Paris [facility]

## 2018-02-25 NOTE — PROGRESS NOTES
"[facility] [place]MISSION NOTE    Patient admitted to room 2314 at approximately 1745 via ambulation from emergency room. Patient was accompanied by transport tech.     Verbal SBAR report received from Noreen prior to patient arrival.     Patient ambulated to bed independently. Patient alert and oriented X 3. Pain is controlled with current analgesics.  Medication(s) being used: narcotic analgesics including IV dilaudid. 0-10 Pain Scale: 8. Admission vital signs: Blood pressure (!) 182/91, temperature 97.5  F (36.4  C), temperature source Oral, resp. rate 18, height 1.778 m (5' 10\"), weight 90.7 kg (200 lb), SpO2 94 %. Patient was oriented to plan of care, call light, bed controls, tv, telephone, bathroom and visiting hours.     The following safety risks were identified during admission: NONE. Yellow risk band applied: HERIBERTO Concepcion    "

## 2018-02-26 LAB
ANION GAP SERPL CALCULATED.3IONS-SCNC: 5 MMOL/L (ref 3–14)
BASOPHILS # BLD AUTO: 0 10E9/L (ref 0–0.2)
BASOPHILS NFR BLD AUTO: 0.2 %
BUN SERPL-MCNC: 9 MG/DL (ref 7–30)
CALCIUM SERPL-MCNC: 8.7 MG/DL (ref 8.5–10.1)
CHLORIDE SERPL-SCNC: 104 MMOL/L (ref 94–109)
CO2 SERPL-SCNC: 29 MMOL/L (ref 20–32)
CREAT SERPL-MCNC: 0.79 MG/DL (ref 0.66–1.25)
DIFFERENTIAL METHOD BLD: ABNORMAL
EOSINOPHIL # BLD AUTO: 0.2 10E9/L (ref 0–0.7)
EOSINOPHIL NFR BLD AUTO: 1.3 %
ERYTHROCYTE [DISTWIDTH] IN BLOOD BY AUTOMATED COUNT: 13.9 % (ref 10–15)
GFR SERPL CREATININE-BSD FRML MDRD: >90 ML/MIN/1.7M2
GLUCOSE BLDC GLUCOMTR-MCNC: 141 MG/DL (ref 70–99)
GLUCOSE BLDC GLUCOMTR-MCNC: 153 MG/DL (ref 70–99)
GLUCOSE BLDC GLUCOMTR-MCNC: 162 MG/DL (ref 70–99)
GLUCOSE BLDC GLUCOMTR-MCNC: 172 MG/DL (ref 70–99)
GLUCOSE BLDC GLUCOMTR-MCNC: 175 MG/DL (ref 70–99)
GLUCOSE SERPL-MCNC: 182 MG/DL (ref 70–99)
HCT VFR BLD AUTO: 40.6 % (ref 40–53)
HGB BLD-MCNC: 13.3 G/DL (ref 13.3–17.7)
IMM GRANULOCYTES # BLD: 0 10E9/L (ref 0–0.4)
IMM GRANULOCYTES NFR BLD: 0.4 %
INR PPP: 3.78 (ref 0.86–1.14)
LIPASE SERPL-CCNC: 391 U/L (ref 73–393)
LYMPHOCYTES # BLD AUTO: 2.2 10E9/L (ref 0.8–5.3)
LYMPHOCYTES NFR BLD AUTO: 19.3 %
MCH RBC QN AUTO: 32.5 PG (ref 26.5–33)
MCHC RBC AUTO-ENTMCNC: 32.8 G/DL (ref 31.5–36.5)
MCV RBC AUTO: 99 FL (ref 78–100)
MONOCYTES # BLD AUTO: 0.9 10E9/L (ref 0–1.3)
MONOCYTES NFR BLD AUTO: 8.2 %
NEUTROPHILS # BLD AUTO: 7.9 10E9/L (ref 1.6–8.3)
NEUTROPHILS NFR BLD AUTO: 70.6 %
PLATELET # BLD AUTO: 220 10E9/L (ref 150–450)
POTASSIUM SERPL-SCNC: 4.1 MMOL/L (ref 3.4–5.3)
RBC # BLD AUTO: 4.09 10E12/L (ref 4.4–5.9)
SODIUM SERPL-SCNC: 138 MMOL/L (ref 133–144)
WBC # BLD AUTO: 11.2 10E9/L (ref 4–11)

## 2018-02-26 PROCEDURE — 83690 ASSAY OF LIPASE: CPT | Performed by: PHYSICIAN ASSISTANT

## 2018-02-26 PROCEDURE — 99233 SBSQ HOSP IP/OBS HIGH 50: CPT | Performed by: INTERNAL MEDICINE

## 2018-02-26 PROCEDURE — 25000128 H RX IP 250 OP 636: Performed by: PHYSICIAN ASSISTANT

## 2018-02-26 PROCEDURE — 12000000 ZZH R&B MED SURG/OB

## 2018-02-26 PROCEDURE — 25000132 ZZH RX MED GY IP 250 OP 250 PS 637: Performed by: PHYSICIAN ASSISTANT

## 2018-02-26 PROCEDURE — 85610 PROTHROMBIN TIME: CPT | Performed by: PHYSICIAN ASSISTANT

## 2018-02-26 PROCEDURE — 00000146 ZZHCL STATISTIC GLUCOSE BY METER IP

## 2018-02-26 PROCEDURE — 80048 BASIC METABOLIC PNL TOTAL CA: CPT | Performed by: PHYSICIAN ASSISTANT

## 2018-02-26 PROCEDURE — 25000125 ZZHC RX 250: Performed by: PHYSICIAN ASSISTANT

## 2018-02-26 PROCEDURE — 85025 COMPLETE CBC W/AUTO DIFF WBC: CPT | Performed by: PHYSICIAN ASSISTANT

## 2018-02-26 PROCEDURE — 36415 COLL VENOUS BLD VENIPUNCTURE: CPT | Performed by: PHYSICIAN ASSISTANT

## 2018-02-26 RX ADMIN — LISINOPRIL 10 MG: 10 TABLET ORAL at 08:11

## 2018-02-26 RX ADMIN — INSULIN ASPART 1 UNITS: 100 INJECTION, SOLUTION INTRAVENOUS; SUBCUTANEOUS at 02:06

## 2018-02-26 RX ADMIN — SODIUM CHLORIDE, POTASSIUM CHLORIDE, SODIUM LACTATE AND CALCIUM CHLORIDE 1000 ML: 600; 310; 30; 20 INJECTION, SOLUTION INTRAVENOUS at 10:16

## 2018-02-26 RX ADMIN — GABAPENTIN 600 MG: 300 CAPSULE ORAL at 22:19

## 2018-02-26 RX ADMIN — SODIUM CHLORIDE, POTASSIUM CHLORIDE, SODIUM LACTATE AND CALCIUM CHLORIDE 1000 ML: 600; 310; 30; 20 INJECTION, SOLUTION INTRAVENOUS at 06:09

## 2018-02-26 RX ADMIN — INSULIN ASPART 1 UNITS: 100 INJECTION, SOLUTION INTRAVENOUS; SUBCUTANEOUS at 10:39

## 2018-02-26 RX ADMIN — INSULIN ASPART 2 UNITS: 100 INJECTION, SOLUTION INTRAVENOUS; SUBCUTANEOUS at 06:24

## 2018-02-26 RX ADMIN — METOPROLOL SUCCINATE 50 MG: 50 TABLET, EXTENDED RELEASE ORAL at 20:35

## 2018-02-26 RX ADMIN — METOPROLOL SUCCINATE 50 MG: 50 TABLET, EXTENDED RELEASE ORAL at 08:11

## 2018-02-26 RX ADMIN — PANTOPRAZOLE SODIUM 40 MG: 40 INJECTION, POWDER, FOR SOLUTION INTRAVENOUS at 06:13

## 2018-02-26 RX ADMIN — SODIUM CHLORIDE, POTASSIUM CHLORIDE, SODIUM LACTATE AND CALCIUM CHLORIDE 1000 ML: 600; 310; 30; 20 INJECTION, SOLUTION INTRAVENOUS at 02:06

## 2018-02-26 RX ADMIN — TAMSULOSIN HYDROCHLORIDE 0.4 MG: 0.4 CAPSULE ORAL at 08:11

## 2018-02-26 NOTE — PLAN OF CARE
"Problem: Pancreatitis, Acute/Chronic (Adult)  Goal: Signs and Symptoms of Listed Potential Problems Will be Absent, Minimized or Managed (Pancreatitis, Acute/Chronic)  Signs and symptoms of listed potential problems will be absent, minimized or managed by discharge/transition of care (reference Pancreatitis, Acute/Chronic (Adult) CPG).   Outcome: Improving  Patient alert, oriented, SBA. Denies abdominal pain, nausea, SOB. NPO. LR running at 250 ml/hr. Bowel sounds positive. /70  Temp 98.5  F (36.9  C) (Oral)  Resp 18  Ht 1.778 m (5' 10\")  Wt 89.2 kg (196 lb 10.4 oz)  SpO2 94%  BMI 28.22 kg/m2        "

## 2018-02-26 NOTE — PROGRESS NOTES
[facility] Medicine Progress Note  Date of Service: 02/26/2018      Assessment & Plan     Antoine Russo is a 72 year old male with PMH significant for CAD, chronic Afib, GERD, hx of pancreatic cancer s/p distal pancreatectomy and splenectomy, GERD, hx of partial colectomy for treatment of diverticulitis, HLD, HTN, BPH, PVD and T2DM who now presents with epigastric and LUQ abdominal pain.            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 Lipase now nl -was 2270 on admit. CT abd showed pancreatitis but also ? Pseudocyst. Has f/u R Beasley 3/7. Tolerated clear liquids today. Will advance to diabetic diet      Leukocytosis  Hx of Splenectomy 2016  Previously cyst of the pancreas in 2016; had spleen taken out at the same time. WBC 16.1 on arrival.  May be stress induced in the setting of splenectomy, however in the presence of pseudocyst this will need to be monitored closely.  No evidence of peritoneal signs on physical exam. No antibiotics given at this point in time.  - wbc down today to 11.2     Previous Alcohol Dependence  Previously drinking between 32-48 oz of beer daily with other pancreatitis exacerbation.  Reports no alcohol use in the last month. On admission, slight HTN and tachycardia. Not tremulous      Hypertension  - continue PTA lisinopril, metoprolol      CAD (coronary artery disease)  Peripheral vascular disease (H)  Stress testing 2009 showed inferior wall ischemia.  Cath preformed; identified moderate CAD with stenosis of 40-50% in LAD and RCA.  No stents were placed.  Echo in 01/2018 (done while in Afib with rates of 100-110); EF of 55-60%.  No RWMA.      Atrial Fibrillation  EKG on admission shows Afib.  Rate slightly elevated, however not true RVR  - continue PTA coumadin      GERD (gastroesophageal reflux disease)  Does  not use medications as an outpatient  - will give once daily IV pantoprazole for GI protection while admitted      Hyperlipidemia LDL goal <100  Not on medication as an outpatient     Type 2 diabetes mellitus with diabetic polyneuropathy, without long-term current use of insulin (H)  - hold PTA metformin/glipizide given above pancreatitis, NPO status  - high SSI ordered with hypo/hyperglycemia protocol; will use NPO protocol      H/O Hemicolectomy due to megacolon as a result of C.diff colitis      Hx of C.diff colitis s/p fecal transplant      Hx of squamous cell carcinoma of the mouth  Follows at the [facility].  S/p resection with Dr. Gerson Ravi in 2012           F: d/c ivf today  N: diabetic diet trail  DVT Prophylaxis: coumadin     Code Status: Full Code     Disposition: Anticipate discharge in tomorrow if tolerates po.     Katie Wyatt       Interval History   Feels great. abd pain only in specific location and with pressure which is his normal. No other sx    Physical Exam   Temp:  [97.1  F (36.2  C)-98.5  F (36.9  C)] 97.8  F (36.6  C)  Pulse:  [75] 75  Heart Rate:  [] 75  Resp:  [18] 18  BP: (140-184)/(66-99) 140/66  SpO2:  [91 %-95 %] 91 %    Weights:   Vitals:    02/25/18 1136 02/26/18 0712   Weight: 200 lb (90.7 kg) 196 lb 10.4 oz (89.2 kg)    Body mass index is 28.22 kg/(m^2).    Constitutional: nad  CV: Regular  Respiratory: CTA bilaterally  GI: Soft, nontender except very focal epigastrium  Skin: Warm and dry  Musculoskeletal:no edema    Data     Recent Labs  Lab 02/26/18  0701 02/25/18  1140 02/22/18   WBC 11.2* 16.1*  --    HGB 13.3 15.7  --    MCV 99 98  --     244  --    INR 3.78* 2.93* 6.4*    134  --    POTASSIUM 4.1 4.2  --    CHLORIDE 104 99  --    CO2 29 27  --    BUN 9 14  --    CR 0.79 0.91  --    ANIONGAP 5 8  --    NILSON 8.7 9.4  --    * 266*  --    ALBUMIN  --  3.8  --    PROTTOTAL  --  8.8  --    BILITOTAL  --  0.6  --    ALKPHOS  --  58  --    ALT  --  29  --     AST  --  13  --    LIPASE 391 2270*  --          Recent Labs  Lab 02/26/18  1033 02/26/18  0701 02/26/18  0620 02/26/18  0153 02/25/18  2124 02/25/18  1759 02/25/18  1140   GLC  --  182*  --   --   --   --  266*   *  --  175* 141* 128* 161*  --         Unresulted Labs Ordered in the Past 30 Days of this Admission     No orders found for last 61 day(s).           Imaging  Recent Results (from the past 24 hour(s))   CT Abdomen Pelvis w Contrast    Narrative    CT ABDOMEN AND PELVIS WITH CONTRAST   2/25/2018 5:04 PM     HISTORY: Recurrent pancreatitis, prior pseudocyst.     TECHNIQUE:  98 mL Isovue 370. Radiation dose for this scan was reduced  using automated exposure control, adjustment of the mA and/or kV  according to patient size, or iterative reconstruction technique.    COMPARISON: 1/23/2018    FINDINGS:  Again there are changes of distal pancreatectomy and  splenectomy. There is peripancreatic edema, consistent with  pancreatitis. Again there is a conglomerate area of coarse  calcifications at the residual distal aspect of the pancreas. There is  an adjacent low-density lesion at the distal aspect of the pancreas.  This may represent a pseudocyst and measures 1.8 cm compared with 1.5  cm previously. Again there are changes of subtotal colectomy. Mild  prostate enlargement. Nothing else acute is seen in the upper  abdominal organs.       Impression    IMPRESSION: Pancreatitis. Mild increase in small fluid collection  which could represent a pseudocyst.     MARGE DARBY MD        Medications     - MEDICATION INSTRUCTIONS -       Warfarin Therapy Reminder         warfarin-No DOSE today  1 each Does not apply no dose today (warfarin)     gabapentin  600 mg Oral At Bedtime     lisinopril  10 mg Oral Daily     metoprolol succinate  50 mg Oral BID     tamsulosin  0.4 mg Oral Daily     insulin aspart  1-12 Units Subcutaneous Q4H     pantoprazole  40 mg Intravenous QAM DAVIDE Wyatt

## 2018-02-26 NOTE — PLAN OF CARE
Problem: Patient Care Overview  Goal: Plan of Care/Patient Progress Review  Outcome: Improving  Lipase decreased to 391. Denies abdominal pain. Abdomin soft with active bowel sounds. Started on clear liquid diet which was well tolerated. Patient now advanced to diabetic diet. IV saline locked. Patient ambulating in hallway independently .

## 2018-02-26 NOTE — PHARMACY-ANTICOAGULATION SERVICE
Clinical Pharmacy - Warfarin Dosing Consult     Pharmacy has been consulted to manage this patient s warfarin therapy.  Indication: Atrial Fibrillation  Therapy Goal: INR 2-3  Provider/Team: [facility]  Warfarin Prior to Admission: Yes  Warfarin PTA Regimen: unstable, per 2/22/18 [facility] note doses for 2/22/18 and 2/23/18 were HELD, plan for patient to take 5mg on 2/24/28 and 5mg on 2/25/18, recheck INR 2/26/18  Recent documented change in oral intake/nutrition: Unknown  Dose Comments: INR 2.93 on 2/24/18, will follow [facility] plan with 5mg WARFARIN on 2/25/18 and INR on 2/26/18    INR   Date Value Ref Range Status   02/25/2018 2.93 (H) 0.86 - 1.14 Final     INR Protime   Date Value Ref Range Status   02/22/2018 6.4 (A) 0.86 - 1.14 Final       Recommend warfarin 5 mg today.  Pharmacy will monitor Antoine Russo daily and order warfarin doses to achieve specified goal.      Please contact pharmacy as soon as possible if the warfarin needs to be held for a procedure or if the warfarin goals change.

## 2018-02-27 ENCOUNTER — TELEPHONE (OUTPATIENT)
Dept: GASTROENTEROLOGY | Facility: CLINIC | Age: 73
End: 2018-02-27

## 2018-02-27 LAB
GLUCOSE BLDC GLUCOMTR-MCNC: 152 MG/DL (ref 70–99)
GLUCOSE BLDC GLUCOMTR-MCNC: 153 MG/DL (ref 70–99)
GLUCOSE BLDC GLUCOMTR-MCNC: 179 MG/DL (ref 70–99)
GLUCOSE BLDC GLUCOMTR-MCNC: 213 MG/DL (ref 70–99)
GLUCOSE BLDC GLUCOMTR-MCNC: 215 MG/DL (ref 70–99)
INR PPP: 3.24 (ref 0.86–1.14)
LIPASE SERPL-CCNC: 1295 U/L (ref 73–393)

## 2018-02-27 PROCEDURE — 12000000 ZZH R&B MED SURG/OB

## 2018-02-27 PROCEDURE — 25000128 H RX IP 250 OP 636: Performed by: FAMILY MEDICINE

## 2018-02-27 PROCEDURE — 99232 SBSQ HOSP IP/OBS MODERATE 35: CPT | Performed by: INTERNAL MEDICINE

## 2018-02-27 PROCEDURE — 25000125 ZZHC RX 250: Performed by: PHYSICIAN ASSISTANT

## 2018-02-27 PROCEDURE — 25000132 ZZH RX MED GY IP 250 OP 250 PS 637: Performed by: INTERNAL MEDICINE

## 2018-02-27 PROCEDURE — 25000128 H RX IP 250 OP 636: Performed by: INTERNAL MEDICINE

## 2018-02-27 PROCEDURE — 83690 ASSAY OF LIPASE: CPT | Performed by: INTERNAL MEDICINE

## 2018-02-27 PROCEDURE — 25000132 ZZH RX MED GY IP 250 OP 250 PS 637: Performed by: PHYSICIAN ASSISTANT

## 2018-02-27 PROCEDURE — 36415 COLL VENOUS BLD VENIPUNCTURE: CPT | Performed by: PHYSICIAN ASSISTANT

## 2018-02-27 PROCEDURE — 25000128 H RX IP 250 OP 636: Performed by: PHYSICIAN ASSISTANT

## 2018-02-27 PROCEDURE — 85610 PROTHROMBIN TIME: CPT | Performed by: PHYSICIAN ASSISTANT

## 2018-02-27 PROCEDURE — 00000146 ZZHCL STATISTIC GLUCOSE BY METER IP

## 2018-02-27 RX ORDER — LABETALOL HYDROCHLORIDE 5 MG/ML
20 INJECTION, SOLUTION INTRAVENOUS ONCE
Status: DISCONTINUED | OUTPATIENT
Start: 2018-02-27 | End: 2018-02-27 | Stop reason: CLARIF

## 2018-02-27 RX ORDER — WARFARIN SODIUM 4 MG/1
4 TABLET ORAL
Status: COMPLETED | OUTPATIENT
Start: 2018-02-27 | End: 2018-02-27

## 2018-02-27 RX ORDER — LABETALOL HYDROCHLORIDE 5 MG/ML
20 INJECTION, SOLUTION INTRAVENOUS EVERY 6 HOURS PRN
Status: DISCONTINUED | OUTPATIENT
Start: 2018-02-27 | End: 2018-03-01 | Stop reason: HOSPADM

## 2018-02-27 RX ORDER — HYDROMORPHONE HYDROCHLORIDE 1 MG/ML
0.5 INJECTION, SOLUTION INTRAMUSCULAR; INTRAVENOUS; SUBCUTANEOUS
Status: DISCONTINUED | OUTPATIENT
Start: 2018-02-27 | End: 2018-02-27

## 2018-02-27 RX ORDER — SODIUM CHLORIDE 9 MG/ML
INJECTION, SOLUTION INTRAVENOUS CONTINUOUS
Status: DISCONTINUED | OUTPATIENT
Start: 2018-02-27 | End: 2018-03-01 | Stop reason: HOSPADM

## 2018-02-27 RX ADMIN — LABETALOL HYDROCHLORIDE 20 MG: 5 INJECTION, SOLUTION INTRAVENOUS at 06:49

## 2018-02-27 RX ADMIN — PROCHLORPERAZINE EDISYLATE 5 MG: 5 INJECTION INTRAMUSCULAR; INTRAVENOUS at 07:42

## 2018-02-27 RX ADMIN — PROCHLORPERAZINE EDISYLATE 5 MG: 5 INJECTION INTRAMUSCULAR; INTRAVENOUS at 13:46

## 2018-02-27 RX ADMIN — METOPROLOL SUCCINATE 50 MG: 50 TABLET, EXTENDED RELEASE ORAL at 08:08

## 2018-02-27 RX ADMIN — Medication 0.5 MG: at 03:36

## 2018-02-27 RX ADMIN — ONDANSETRON 4 MG: 2 INJECTION INTRAMUSCULAR; INTRAVENOUS at 03:32

## 2018-02-27 RX ADMIN — WARFARIN SODIUM 4 MG: 4 TABLET ORAL at 17:38

## 2018-02-27 RX ADMIN — SODIUM CHLORIDE: 9 INJECTION, SOLUTION INTRAVENOUS at 09:57

## 2018-02-27 RX ADMIN — LISINOPRIL 10 MG: 10 TABLET ORAL at 08:08

## 2018-02-27 RX ADMIN — METOPROLOL SUCCINATE 50 MG: 50 TABLET, EXTENDED RELEASE ORAL at 20:49

## 2018-02-27 RX ADMIN — GABAPENTIN 600 MG: 300 CAPSULE ORAL at 20:52

## 2018-02-27 RX ADMIN — SODIUM CHLORIDE: 9 INJECTION, SOLUTION INTRAVENOUS at 19:54

## 2018-02-27 RX ADMIN — Medication 0.5 MG: at 05:26

## 2018-02-27 RX ADMIN — PANTOPRAZOLE SODIUM 40 MG: 40 INJECTION, POWDER, FOR SOLUTION INTRAVENOUS at 06:25

## 2018-02-27 RX ADMIN — TAMSULOSIN HYDROCHLORIDE 0.4 MG: 0.4 CAPSULE ORAL at 08:08

## 2018-02-27 RX ADMIN — Medication 0.5 MG: at 06:32

## 2018-02-27 RX ADMIN — Medication 0.5 MG: at 07:33

## 2018-02-27 NOTE — PROGRESS NOTES
[facility] Medicine Progress Note  Date of Service: 02/27/2018      Assessment & Plan     Antoine Russo is a 72 year old male with PMH significant for CAD, chronic Afib, GERD, hx of pancreatic cancer s/p distal pancreatectomy and splenectomy, GERD, hx of partial colectomy for treatment of diverticulitis, HLD, HTN, BPH, PVD and T2DM who now presents with epigastric and LUQ abdominal pain.            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 Ate normal dinner. Then woke 3 am 2/27 with worse abd pain radiation to back and n/v.. Placed on npo. Started on dilaudid again and zofran.Lipase now nl -was 2270 on admit, normal yest and now 1295.. CT abd showed pancreatitis but also ? Pseudocyst. Has f/u R Beasley 3/7. Will once again keep npo, ivf, pain control. Follow. If doesn't improve would consider mrcp.      Leukocytosis  Hx of Splenectomy 2016  Previously cyst of the pancreas in 2016; had spleen taken out at the same time. WBC 16.1 on arrival.  .  - wbc down 2/26 to 11.2     Previous Alcohol Dependence  Previously drinking between 32-48 oz of beer daily with other pancreatitis exacerbation.  Reports no alcohol use in the last month. On admission, slight HTN and tachycardia. Not tremulous      Hypertension  - continue PTA lisinopril, metoprolol      CAD (coronary artery disease)  Peripheral vascular disease (H)  Stress testing 2009 showed inferior wall ischemia.  Cath preformed; identified moderate CAD with stenosis of 40-50% in LAD and RCA.  No stents were placed.  Echo in 01/2018 (done while in Afib with rates of 100-110); EF of 55-60%.  No RWMA.      Atrial Fibrillation  EKG on admission shows Afib.  Rate controlled.- continue PTA coumadin, inr therapeutic      GERD (gastroesophageal reflux disease)  Does not use  medications as an outpatient  - will give once daily IV pantoprazole for GI protection while admitted      Hyperlipidemia LDL goal <100  Not on medication as an outpatient     Type 2 diabetes mellitus with diabetic polyneuropathy, without long-term current use of insulin (H)  - hold PTA metformin/glipizide given above pancreatitis, NPO status  - high SSI ordered with hypo/hyperglycemia protocol      H/O Hemicolectomy due to megacolon as a result of C.diff colitis      Hx of C.diff colitis s/p fecal transplant      Hx of squamous cell carcinoma of the mouth  Follows at the [facility].  S/p resection with Dr. Gerson Ravi in 2012           F: restart ivf today  N: npo  DVT Prophylaxis: coumadin     Code Status: Full Code     Disposition: Anticipate discharge in tomorrow if tolerates po.     Katie Wyatt       Interval History   Ate normal dinner. Woke to worse pain 3 am. Also n/v. Sx now controlled on dilaudid and zofran but still pain.    Physical Exam   Temp:  [97.6  F (36.4  C)-98.6  F (37  C)] 97.7  F (36.5  C)  Pulse:  [77-95] 78  Heart Rate:  [62-89] 89  Resp:  [16-18] 16  BP: (149-198)/(62-88) 175/81  SpO2:  [93 %-98 %] 94 %    Weights:   Vitals:    02/25/18 1136 02/26/18 0712 02/27/18 0658   Weight: 200 lb (90.7 kg) 196 lb 10.4 oz (89.2 kg) 191 lb 9.3 oz (86.9 kg)    Body mass index is 27.49 kg/(m^2).    Constitutional: nad  CV: Regular  Respiratory: CTA bilaterally  GI: soft, + bs, tender  Skin: Warm and dry  Musculoskeletal:no edema    Data     Recent Labs  Lab 02/27/18  0722 02/26/18  0701 02/25/18  1140   WBC  --  11.2* 16.1*   HGB  --  13.3 15.7   MCV  --  99 98   PLT  --  220 244   INR 3.24* 3.78* 2.93*   NA  --  138 134   POTASSIUM  --  4.1 4.2   CHLORIDE  --  104 99   CO2  --  29 27   BUN  --  9 14   CR  --  0.79 0.91   ANIONGAP  --  5 8   NILSON  --  8.7 9.4   GLC  --  182* 266*   ALBUMIN  --   --  3.8   PROTTOTAL  --   --  8.8   BILITOTAL  --   --  0.6   ALKPHOS  --   --  58   ALT  --   --  29   AST  --    --  13   LIPASE 1295* 391 2270*         Recent Labs  Lab 02/27/18  1121 02/27/18  0643 02/27/18  0158 02/26/18  2145 02/26/18  1646 02/26/18  1033 02/26/18  0701  02/25/18  1140   GLC  --   --   --   --   --   --  182*  --  266*   * 213* 179* 162* 172* 153*  --   < >  --    < > = values in this interval not displayed.        Imaging  No results found for this or any previous visit (from the past 24 hour(s)).     Medications     sodium chloride 100 mL/hr at 02/27/18 0957     - MEDICATION INSTRUCTIONS -       Warfarin Therapy Reminder         warfarin  4 mg Oral ONCE at 18:00     sodium chloride (PF)  3 mL Intracatheter Q8H     insulin aspart  1-12 Units Subcutaneous TID w/meals     gabapentin  600 mg Oral At Bedtime     lisinopril  10 mg Oral Daily     metoprolol succinate  50 mg Oral BID     tamsulosin  0.4 mg Oral Daily     pantoprazole  40 mg Intravenous QAM DAVIDE Wyatt

## 2018-02-27 NOTE — PLAN OF CARE
Problem: Patient Care Overview  Goal: Plan of Care/Patient Progress Review  Outcome: Improving  Patient tolerating diabetic diet. Denies abdominal pain. Patient ambulated in hallway.

## 2018-02-27 NOTE — TELEPHONE ENCOUNTER
Spoke with patient reminding of upcoming appointment 3/7 with Dr. Beasley.  He states he has the number / will use mychart, if needs to reschedule.

## 2018-02-27 NOTE — PLAN OF CARE
Problem: Pancreatitis, Acute/Chronic (Adult)  Goal: Signs and Symptoms of Listed Potential Problems Will be Absent, Minimized or Managed (Pancreatitis, Acute/Chronic)  Signs and symptoms of listed potential problems will be absent, minimized or managed by discharge/transition of care (reference Pancreatitis, Acute/Chronic (Adult) CPG).   Outcome: Improving  Last evening pt was feeling very good. Denied any nausea/pain. Was up out of bed walking in the hallways, visiting w/ staff. At 0330 pt reported pain woke him up and he hasn't been able to rest again. Pt describes pain as a soreness starting in his back and radiating straight through to his stomach. Says it's starting to make him nauseated. Zofran and dilaudid given IV.

## 2018-02-27 NOTE — PLAN OF CARE
Problem: Pancreatitis, Acute/Chronic (Adult)  Goal: Signs and Symptoms of Listed Potential Problems Will be Absent, Minimized or Managed (Pancreatitis, Acute/Chronic)  Signs and symptoms of listed potential problems will be absent, minimized or managed by discharge/transition of care (reference Pancreatitis, Acute/Chronic (Adult) CPG).   Outcome: Declining  Dilaudid not quite holding his pain and BP elevated. Spoke w/ Dr. Madeline HERMAN on-call, will plan to increase the frequency of the dilaudid to q 1hr prn x 4 doses to try to get on top of his pain. Also received an order for prn labetalol if BP remains high. Continue to monitor.

## 2018-02-28 LAB
GLUCOSE BLDC GLUCOMTR-MCNC: 130 MG/DL (ref 70–99)
GLUCOSE BLDC GLUCOMTR-MCNC: 145 MG/DL (ref 70–99)
GLUCOSE BLDC GLUCOMTR-MCNC: 145 MG/DL (ref 70–99)
GLUCOSE BLDC GLUCOMTR-MCNC: 148 MG/DL (ref 70–99)
GLUCOSE BLDC GLUCOMTR-MCNC: 189 MG/DL (ref 70–99)
INR PPP: 3.82 (ref 0.86–1.14)

## 2018-02-28 PROCEDURE — 25000128 H RX IP 250 OP 636: Performed by: INTERNAL MEDICINE

## 2018-02-28 PROCEDURE — 99232 SBSQ HOSP IP/OBS MODERATE 35: CPT | Performed by: INTERNAL MEDICINE

## 2018-02-28 PROCEDURE — 25000132 ZZH RX MED GY IP 250 OP 250 PS 637: Performed by: PHYSICIAN ASSISTANT

## 2018-02-28 PROCEDURE — 25000125 ZZHC RX 250: Performed by: PHYSICIAN ASSISTANT

## 2018-02-28 PROCEDURE — 85610 PROTHROMBIN TIME: CPT

## 2018-02-28 PROCEDURE — 00000146 ZZHCL STATISTIC GLUCOSE BY METER IP

## 2018-02-28 PROCEDURE — 12000000 ZZH R&B MED SURG/OB

## 2018-02-28 PROCEDURE — 36415 COLL VENOUS BLD VENIPUNCTURE: CPT

## 2018-02-28 RX ADMIN — SODIUM CHLORIDE: 9 INJECTION, SOLUTION INTRAVENOUS at 05:30

## 2018-02-28 RX ADMIN — PANTOPRAZOLE SODIUM 40 MG: 40 INJECTION, POWDER, FOR SOLUTION INTRAVENOUS at 05:30

## 2018-02-28 RX ADMIN — TAMSULOSIN HYDROCHLORIDE 0.4 MG: 0.4 CAPSULE ORAL at 07:46

## 2018-02-28 RX ADMIN — METOPROLOL SUCCINATE 50 MG: 50 TABLET, EXTENDED RELEASE ORAL at 19:37

## 2018-02-28 RX ADMIN — LISINOPRIL 10 MG: 10 TABLET ORAL at 07:46

## 2018-02-28 RX ADMIN — METOPROLOL SUCCINATE 50 MG: 50 TABLET, EXTENDED RELEASE ORAL at 07:46

## 2018-02-28 RX ADMIN — GABAPENTIN 600 MG: 300 CAPSULE ORAL at 19:37

## 2018-02-28 NOTE — PROGRESS NOTES
Pt has not required any additional pain medication over the past 12 hours, had mild nausea, requesting compazine which resolved the symptoms. MIV infusing at 100 mls per hr. Pt is NPO. VSS. Has been up ambulating on antoine, Showered independently. VSS, afebrile. Continue with current plan of care.

## 2018-02-28 NOTE — PLAN OF CARE
Problem: Pancreatitis, Acute/Chronic (Adult)  Goal: Signs and Symptoms of Listed Potential Problems Will be Absent, Minimized or Managed (Pancreatitis, Acute/Chronic)  Signs and symptoms of listed potential problems will be absent, minimized or managed by discharge/transition of care (reference Pancreatitis, Acute/Chronic (Adult) CPG).   Outcome: Improving  patient tolerating clear liquid breakfast will advance to full liquid for lunch will continue to monitor

## 2018-02-28 NOTE — PROGRESS NOTES
[facility] Medicine Progress Note  Date of Service: 02/28/2018      Assessment & Plan     Antoine Russo is a 72 year old male with PMH significant for CAD, chronic Afib, GERD, hx of pancreatic cancer s/p distal pancreatectomy and splenectomy, GERD, hx of partial colectomy for treatment of diverticulitis, HLD, HTN, BPH, PVD and T2DM who now presents with epigastric and LUQ abdominal pain.            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 Will try clears, adat  Same occurred 2/26 but then had recurrent pain 2/27.was 2270 on admit then nl then yest was 1295 when having pain again. . CT abd showed pancreatitis but also ? Pseudocyst will area of fluid collection 1.8 ( up from 1.5 1/2018). Has f/u DR Beasley 3/7.      Leukocytosis  Hx of Splenectomy 2016  Previously cyst of the pancreas in 2016; had spleen taken out at the same time. WBC 16.1 on arrival.  .  - wbc down 2/26 to 11.2     Previous Alcohol Dependence  Previously drinking between 32-48 oz of beer daily with other pancreatitis exacerbation.  Reports no alcohol use in the last month. On admission, slight HTN and tachycardia. Not tremulous      Hypertension  - continue PTA lisinopril, metoprolol      CAD (coronary artery disease)  Peripheral vascular disease (H)  Stress testing 2009 showed inferior wall ischemia.  Cath preformed; identified moderate CAD with stenosis of 40-50% in LAD and RCA.  No stents were placed.  Echo in 01/2018 (done while in Afib with rates of 100-110); EF of 55-60%.  No RWMA.      Atrial Fibrillation  EKG on admission shows Afib.  Rate controlled.- continue PTA coumadin, inr supratherapeutic- pharm following      GERD (gastroesophageal reflux disease)  Does not use medications as an outpatient  - will give once daily IV pantoprazole for GI protection  while admitted      Hyperlipidemia LDL goal <100  Not on medication as an outpatient     Type 2 diabetes mellitus with diabetic polyneuropathy, without long-term current use of insulin (H)  - hold PTA metformin/glipizide given above pancreatitis, NPO status  - high SSI ordered with hypo/hyperglycemia protocol      H/O Hemicolectomy due to megacolon as a result of C.diff colitis      Hx of C.diff colitis s/p fecal transplant      Hx of squamous cell carcinoma of the mouth  [facility]  S/p resection with Dr. Gersno Ravi in 2012          DVT Prophylaxis: coumadin     Code Status: Full Code     Disposition: Anticipate discharge in tomorrow if tolerates po.     Katie Wyatt       Interval History   Pain gone again. Feels good    Physical Exam   Temp:  [96.8  F (36  C)-97.9  F (36.6  C)] 97.9  F (36.6  C)  Pulse:  [67-81] 77  Heart Rate:  [72] 72  Resp:  [16] 16  BP: (114-142)/(57-73) 115/59  SpO2:  [93 %-98 %] 93 %    Weights:   Vitals:    02/26/18 0712 02/27/18 0658 02/28/18 0554   Weight: 196 lb 10.4 oz (89.2 kg) 191 lb 9.3 oz (86.9 kg) 192 lb 7.4 oz (87.3 kg)    Body mass index is 27.62 kg/(m^2).    Constitutional: nad  CV: Regular  Respiratory: CTA bilaterally  GI: soft, + bs,nontender  Skin: Warm and dry  Musculoskeletal:no edema    Data     Recent Labs  Lab 02/28/18  0635 02/27/18  0722 02/26/18  0701 02/25/18  1140   WBC  --   --  11.2* 16.1*   HGB  --   --  13.3 15.7   MCV  --   --  99 98   PLT  --   --  220 244   INR 3.82* 3.24* 3.78* 2.93*   NA  --   --  138 134   POTASSIUM  --   --  4.1 4.2   CHLORIDE  --   --  104 99   CO2  --   --  29 27   BUN  --   --  9 14   CR  --   --  0.79 0.91   ANIONGAP  --   --  5 8   INLSON  --   --  8.7 9.4   GLC  --   --  182* 266*   ALBUMIN  --   --   --  3.8   PROTTOTAL  --   --   --  8.8   BILITOTAL  --   --   --  0.6   ALKPHOS  --   --   --  58   ALT  --   --   --  29   AST  --   --   --  13   LIPASE  --  1295* 991 9648*         Recent Labs  Lab 02/28/18  5071  02/28/18  0642 02/28/18  0229 02/27/18  2126 02/27/18  1633 02/27/18  1121  02/26/18  0701  02/25/18  1140   GLC  --   --   --   --   --   --   --  182*  --  266*   * 145* 145* 153* 152* 215*  < >  --   < >  --    < > = values in this interval not displayed.        Imaging  No results found for this or any previous visit (from the past 24 hour(s)).     Medications     sodium chloride 100 mL/hr at 02/28/18 0530     - MEDICATION INSTRUCTIONS -       Warfarin Therapy Reminder         warfarin-No DOSE today  1 each Does not apply no dose today (warfarin)     sodium chloride (PF)  3 mL Intracatheter Q8H     insulin aspart  1-12 Units Subcutaneous TID w/meals     gabapentin  600 mg Oral At Bedtime     lisinopril  10 mg Oral Daily     metoprolol succinate  50 mg Oral BID     tamsulosin  0.4 mg Oral Daily     pantoprazole  40 mg Intravenous QAM DAVIDE Wyatt

## 2018-03-01 ENCOUNTER — ANTICOAGULATION THERAPY VISIT (OUTPATIENT)
Dept: ANTICOAGULATION | Facility: CLINIC | Age: 73
End: 2018-03-01
Payer: COMMERCIAL

## 2018-03-01 VITALS
HEIGHT: 70 IN | OXYGEN SATURATION: 94 % | DIASTOLIC BLOOD PRESSURE: 64 MMHG | TEMPERATURE: 96.6 F | WEIGHT: 192.46 LBS | SYSTOLIC BLOOD PRESSURE: 138 MMHG | BODY MASS INDEX: 27.55 KG/M2 | RESPIRATION RATE: 16 BRPM | HEART RATE: 68 BPM

## 2018-03-01 DIAGNOSIS — I48.20 CHRONIC ATRIAL FIBRILLATION (H): ICD-10-CM

## 2018-03-01 LAB
GLUCOSE BLDC GLUCOMTR-MCNC: 161 MG/DL (ref 70–99)
GLUCOSE BLDC GLUCOMTR-MCNC: 167 MG/DL (ref 70–99)
INR PPP: 3.92 (ref 0.86–1.14)

## 2018-03-01 PROCEDURE — 99207 ZZC NO CHARGE NURSE ONLY: CPT

## 2018-03-01 PROCEDURE — 25000125 ZZHC RX 250: Performed by: PHYSICIAN ASSISTANT

## 2018-03-01 PROCEDURE — 85610 PROTHROMBIN TIME: CPT | Performed by: PHYSICIAN ASSISTANT

## 2018-03-01 PROCEDURE — 36415 COLL VENOUS BLD VENIPUNCTURE: CPT | Performed by: PHYSICIAN ASSISTANT

## 2018-03-01 PROCEDURE — 00000146 ZZHCL STATISTIC GLUCOSE BY METER IP

## 2018-03-01 PROCEDURE — 99238 HOSP IP/OBS DSCHRG MGMT 30/<: CPT | Performed by: INTERNAL MEDICINE

## 2018-03-01 PROCEDURE — 25000132 ZZH RX MED GY IP 250 OP 250 PS 637: Performed by: PHYSICIAN ASSISTANT

## 2018-03-01 RX ORDER — WARFARIN SODIUM 5 MG/1
5 TABLET ORAL DAILY
Qty: 30 TABLET | Refills: 0 | Status: SHIPPED | OUTPATIENT
Start: 2018-03-01 | End: 2018-03-05

## 2018-03-01 RX ADMIN — LISINOPRIL 10 MG: 10 TABLET ORAL at 07:44

## 2018-03-01 RX ADMIN — PANTOPRAZOLE SODIUM 40 MG: 40 INJECTION, POWDER, FOR SOLUTION INTRAVENOUS at 06:05

## 2018-03-01 RX ADMIN — METOPROLOL SUCCINATE 50 MG: 50 TABLET, EXTENDED RELEASE ORAL at 07:44

## 2018-03-01 RX ADMIN — TAMSULOSIN HYDROCHLORIDE 0.4 MG: 0.4 CAPSULE ORAL at 07:44

## 2018-03-01 NOTE — PLAN OF CARE
[facility] DISCHARGE NOTE    Patient discharged to home at 10:50 AM via wheel chair. Accompanied by spouse and staff. Discharge instructions reviewed with patient, opportunity offered to ask questions. Prescriptions sent to patients preferred pharmacy. All belongings sent with patient.    Antoine Maciel

## 2018-03-01 NOTE — DISCHARGE SUMMARY
[facility]    Discharge Summary  [facility] Medicine    Date of Admission:  2/25/2018  Date of Discharge:  3/1/2018   Discharging Provider: Katie Wyatt  Date of Service: 3/1/2018     Primary Care     Katie Gross  [address] [facility] [address]      Assessment & Plan     Antoine Russo is a 72 year old male with PMH significant for CAD, chronic Afib, GERD, hx of pancreatic cancer s/p distal pancreatectomy and splenectomy, GERD, hx of partial colectomy for treatment of diverticulitis, HLD, HTN, BPH, PVD and T2DM who now presents with epigastric and LUQ abdominal pain.              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 OK to d/c home today  Same occurred 2/26 but then had recurrent pain 2/27.lipase was 2270 on admit then nl then yest was 1295 when having pain again. . CT abd on admit  showed pancreatitis but also ? Pseudocyst with area of fluid collection 1.8 ( up from 1.5 1/2018). Has f/u DR Beasley 3/7.      Leukocytosis  Hx of Splenectomy 2016  Previously cyst of the pancreas in 2016; had spleen taken out at the same time. WBC 16.1 on arrival.  .  - wbc down 2/26 to 11.2      Previous Alcohol Dependence  Previously drinking between 32-48 oz of beer daily with other pancreatitis exacerbation.  Reports no alcohol use in the last month. On admission, slight HTN and tachycardia. Not tremulous      Hypertension  - continue PTA lisinopril, metoprolol      CAD (coronary artery disease)  Peripheral vascular disease (H)  Stress testing 2009 showed inferior wall ischemia.  Cath preformed; identified moderate CAD with stenosis of 40-50% in LAD and RCA.  No stents were placed.  Echo in 01/2018 (done while in Afib with rates of 100-110); EF of 55-60%.  No RWMA.      Atrial Fibrillation  EKG on admission shows Afib.  Rate controlled.-  continue PTA coumadin, inr supratherapeutic.will need inr 3/2 before restarting coumadin      GERD (gastroesophageal reflux disease)  Does not use medications as an outpatient  - got once daily IV pantoprazole for GI protection while admitted      Hyperlipidemia LDL goal <100  Not on medication as an outpatient      Type 2 diabetes mellitus with diabetic polyneuropathy, without long-term current use of insulin (H)  - hold PTA metformin/glipizide given above pancreatitis, NPO status  - high SSI ordered with hypo/hyperglycemia protocol      H/O Hemicolectomy due to megacolon as a result of C.diff colitis      Hx of C.diff colitis s/p fecal transplant      Hx of squamous cell carcinoma of the mouth  Follows at the [facility].  S/p resection with Dr. Gerson Ravi in 2012    Discharge Disposition   home    Discharge Orders     Follow-up and recommended labs and tests    Need INR 3/2.  Keep appt next week with DR Beasley, GI       Discharge Medications   Current Discharge Medication List      CONTINUE these medications which have CHANGED    Details   warfarin (COUMADIN) 5 MG tablet Take 1 tablet (5 mg) by mouth daily Please do not take coumadin until directed to by coumadin clinic  Qty: 30 tablet, Refills: 0    Associated Diagnoses: Chronic atrial fibrillation (H)         CONTINUE these medications which have NOT CHANGED    Details   metFORMIN (GLUCOPHAGE-XR) 500 MG 24 hr tablet Take 2 tablets (1,000 mg) by mouth 2 times daily (with meals)  Qty: 360 tablet, Refills: 1    Associated Diagnoses: Type 2 diabetes mellitus with diabetic polyneuropathy, without long-term current use of insulin (H)      tamsulosin (FLOMAX) 0.4 MG capsule Take 1 capsule (0.4 mg) by mouth daily  Qty: 90 capsule, Refills: 3    Associated Diagnoses: Urgency of urination; Hypertrophy of prostate without urinary obstruction      lisinopril (PRINIVIL/ZESTRIL) 10 MG tablet Take 1 tablet (10 mg) by mouth daily  Qty: 90 tablet, Refills: 1    Associated  Diagnoses: Persistent proteinuria      glipiZIDE (GLIPIZIDE XL) 2.5 MG 24 hr tablet Take 1 tablet (2.5 mg) by mouth every morning  Qty: 90 tablet, Refills: 3    Associated Diagnoses: Type 2 diabetes mellitus with diabetic polyneuropathy, without long-term current use of insulin (H)      metoprolol (TOPROL-XL) 50 MG 24 hr tablet Take 1 tablet (50 mg) by mouth daily  Qty: 90 tablet, Refills: 3    Associated Diagnoses: Hypertension, benign essential, goal below 140/90      gabapentin (NEURONTIN) 300 MG capsule Take 1 tablet (300 mg) every night for 3 days, then if needed, go to two at HS  Qty: 60 capsule, Refills: 3    Associated Diagnoses: Type 2 diabetes mellitus with diabetic polyneuropathy, without long-term current use of insulin (H)      multivitamin, therapeutic with minerals (THERA-VIT-M) TABS Take 1 tablet by mouth daily.  Qty: 30 each, Refills: 1    Associated Diagnoses: Surgery aftercare           Allergies   Allergies   Allergen Reactions     Nkda [No Known Drug Allergies]        Consultations This Hospital Stay     PHARMACY TO DOSE WARFARIN    Significant Results and Procedures   Procedures        Data   Results for orders placed or performed during the hospital encounter of 02/25/18   Abdomen US, limited (RUQ only)    Narrative    RIGHT UPPER QUADRANT ULTRASOUND 2/25/2018 1:57 PM    HISTORY:  Pancreatitis despite alcohol cessation.    COMPARISON: 1/23/2018 CT abdomen and pelvis.    FINDINGS:    Gallbladder:  Normal with no cholelithiasis, wall thickening or focal  tenderness.      Bile ducts:   CHD is normal diameter.  No intrahepatic biliary  dilatation.    Liver:  16.5 cm in length. Mild fatty infiltration. No focal liver  lesions identified.    Pancreas:  Pancreas is partially obscured. There is a hypoechoic 4.6 x  2.7 x 2.9 cm area in the region of the pancreatic tail which is  indeterminate, could be an area of complex fluid or focal edema.    Right kidney:  13.5 cm in length. No hydronephrosis.  There may be a  focal area of cortical thinning or scarring at the lateral mid kidney  and 1.4 cm superior right renal cyst, 4 cm cyst inferior right kidney.      Impression    IMPRESSION:    1. Normal gallbladder, no gallstones.  2. Hypoechoic area near the pancreatic tail which is indeterminate.  The 1/23/2018 CT demonstrated postoperative changes consistent with  distal pancreatectomy, and this hypoechoic area could represent the  low attenuation area on CT adjacent to the postsurgical change,  although nonspecific.  3. Right renal cysts.      HARSHAL ISBELL MD   CT Abdomen Pelvis w Contrast    Narrative    CT ABDOMEN AND PELVIS WITH CONTRAST   2/25/2018 5:04 PM     HISTORY: Recurrent pancreatitis, prior pseudocyst.     TECHNIQUE:  98 mL Isovue 370. Radiation dose for this scan was reduced  using automated exposure control, adjustment of the mA and/or kV  according to patient size, or iterative reconstruction technique.    COMPARISON: 1/23/2018    FINDINGS:  Again there are changes of distal pancreatectomy and  splenectomy. There is peripancreatic edema, consistent with  pancreatitis. Again there is a conglomerate area of coarse  calcifications at the residual distal aspect of the pancreas. There is  an adjacent low-density lesion at the distal aspect of the pancreas.  This may represent a pseudocyst and measures 1.8 cm compared with 1.5  cm previously. Again there are changes of subtotal colectomy. Mild  prostate enlargement. Nothing else acute is seen in the upper  abdominal organs.       Impression    IMPRESSION: Pancreatitis. Mild increase in small fluid collection  which could represent a pseudocyst.     MARGE DARBY MD           Physical Exam   Temp:  [96.6  F (35.9  C)-98.3  F (36.8  C)] 96.6  F (35.9  C)  Pulse:  [67-80] 68  Resp:  [16] 16  BP: (115-139)/(52-65) 138/64  SpO2:  [93 %-95 %] 94 %  Vitals:    02/26/18 0712 02/27/18 0658 02/28/18 0554   Weight: 196 lb 10.4 oz (89.2 kg) 191 lb 9.3 oz (86.9 kg)  192 lb 7.4 oz (87.3 kg)       Constitutional: nad    GI: Soft, nontender        The discharge plan was discussed with the patient       Katie Wyatt

## 2018-03-01 NOTE — PROGRESS NOTES
"    ANTICOAGULATION FOLLOW-UP CLINIC VISIT    Patient Name:  Antoine Russo  Date:  3/1/2018  Contact Type:  Chart review only after hospitalization    SUBJECTIVE:     Patient Findings     Positives Hospital admission (Hospitalized from 2/25 - 3/1 for acute pancreatitis)    Comments Patient was discharged to home. No changes to his medications were made.     Per inpatient pharmacy, \"Patient is to hold his warfarin dose today and tomorrow. He is to take 5 mg on Saturday and 2.5 mg on Sunday and recheck INR on Monday with the [facility]. Recommend discharging the patient on a warfarin regimen of 5mg tablets.The patient should have an INR checked Monday (3/5).\"           OBJECTIVE    INR   Date Value Ref Range Status   03/01/2018 3.92 (H) 0.86 - 1.14 Final       ASSESSMENT / PLAN  No question data found.  Anticoagulation Summary as of 3/1/2018     INR goal 2.0-3.0   Today's INR 3.92!   Maintenance plan No maintenance plan   Full instructions 3/1: Hold; 3/2: Hold; 3/3: 5 mg; 3/4: 2.5 mg; Otherwise No maintenance plan   Next INR check 3/5/2018   Priority INR   Target end date Indefinite    Indications   Chronic atrial fibrillation (H) [I48.2]         Anticoagulation Episode Summary     INR check location     Preferred lab     Send INR reminders to [facility] POOL    Comments *      Anticoagulation Care Providers     Provider Role Specialty Phone number    Katie Gross MD [facility] [phone]            See the Encounter Report to view Anticoagulation Flowsheet and Dosing Calendar (Go to Encounters tab in chart review, and find the Anticoagulation Therapy Visit)        Adeline Duke RN               "

## 2018-03-01 NOTE — PHARMACY - DISCHARGE MEDICATION RECONCILIATION
Clinical Pharmacy- Warfarin Discharge Note  This patient is currently on warfarin for the treatment of Atrial fibrillation.  INR Goal= 2-3  Expected length of therapy lifetime.    Anticoagulation Dose History     Recent Dosing and Labs Latest Ref Rng & Units 2/19/2018 2/22/2018 2/25/2018 2/26/2018 2/27/2018 2/28/2018 3/1/2018    Warfarin 4 mg - - - - - 4 mg - -    Warfarin 5 mg - - - 5 mg - - - -    INR 0.86 - 1.14 - - 2.93(H) 3.78(H) 3.24(H) 3.82(H) 3.92(H)    INR 0.86 - 1.14 4.7(A) 6.4(A) - - - - -          Patient is to hold his warfarin dose today and tomorrow. He is to take 5 mg on Saturday and 2.5 mg on Sunday and recheck INR on Monday with the [facility].  Recommend discharging the patient on a warfarin regimen of juliane 5mg tablets.      The patient should have an INR checked Monday (3/5).

## 2018-03-01 NOTE — MR AVS SNAPSHOT
Antoine Russo   3/1/2018   Anticoagulation Therapy Visit    Description:  72 year old male   Provider:  Adeline Duke, RN   [facility]           INR as of 3/1/2018     Today's INR 3.92!      Anticoagulation Summary as of 3/1/2018     INR goal 2.0-3.0   Today's INR 3.92!   Full instructions 3/1: Hold; 3/2: Hold; 3/3: 5 mg; 3/4: 2.5 mg; Otherwise No maintenance plan   Next INR check 3/5/2018    Indications   Chronic atrial fibrillation (H) [I48.2]         Your next Anticoagulation Clinic appointment(s)     Mar 05, 2018 11:45 AM CST   [facility] with [facility]   [facility]    [address] [place] [id number]   [phone]            Mar 08, 2018 10:45 AM CST   Anticoagulation Visit with [facility]   [facility]    [address] [place] [id number]   [phone]              March 2018 Details    Sun Mon Tue Wed Thu Fri Sat         1      Hold   See details      2      Hold         3      5 mg           4      2.5 mg         5            6               7               8               9               10                 11               12               13               14               15               16               17                 18               19               20               21               22               23               24                 25               26               27               28               29               30               31                Date Details   03/01 This INR check       Date of next INR:  3/5/2018         How to take your warfarin dose     To take:  2.5 mg Take 0.5 of a 5 mg tablet.    To take:  5 mg Take 1 of the 5 mg tablets.    Hold Do not take your warfarin dose. See the Details table to the right for additional instructions.

## 2018-03-01 NOTE — PLAN OF CARE
Problem: Pancreatitis, Acute/Chronic (Adult)  Goal: Signs and Symptoms of Listed Potential Problems Will be Absent, Minimized or Managed (Pancreatitis, Acute/Chronic)  Signs and symptoms of listed potential problems will be absent, minimized or managed by discharge/transition of care (reference Pancreatitis, Acute/Chronic (Adult) CPG).   Outcome: Improving  Denies any issues thru the night.  Denies any pain, tolerated regular diet this am.up to BR to void without incident.  Eager for discharge today.

## 2018-03-01 NOTE — DISCHARGE INSTRUCTIONS
Warfarin Instructions:  Your INR today is 3.92 Goal INR 2-3  Please hold your warfarin dose today and tomorrow. (Thursday and Friday)  Take 5 mg on Saturday and 2.5 mg on Sunday (half tablet) and recheck your INR on Monday with the Anticoagulation Clinic as you typically do.  Schedule an appointment for this visit [phone]

## 2018-03-02 ENCOUNTER — TELEPHONE (OUTPATIENT)
Dept: FAMILY MEDICINE | Facility: CLINIC | Age: 73
End: 2018-03-02

## 2018-03-02 NOTE — TELEPHONE ENCOUNTER
ED/UC/IP follow up phone call: 03.01.2018  IP    RN please call to follow up. Pancreatitis    Number of ED visits in past 12 months = 1    Yamila Massey CSS

## 2018-03-05 ENCOUNTER — ANTICOAGULATION THERAPY VISIT (OUTPATIENT)
Dept: ANTICOAGULATION | Facility: CLINIC | Age: 73
End: 2018-03-05
Payer: COMMERCIAL

## 2018-03-05 DIAGNOSIS — I48.20 CHRONIC ATRIAL FIBRILLATION (H): ICD-10-CM

## 2018-03-05 LAB — INR POINT OF CARE: 2.1 (ref 0.86–1.14)

## 2018-03-05 PROCEDURE — 36416 COLLJ CAPILLARY BLOOD SPEC: CPT

## 2018-03-05 PROCEDURE — 99207 ZZC NO CHARGE NURSE ONLY: CPT

## 2018-03-05 PROCEDURE — 85610 PROTHROMBIN TIME: CPT | Mod: QW

## 2018-03-05 RX ORDER — WARFARIN SODIUM 2.5 MG/1
5 TABLET ORAL DAILY
Qty: 90 TABLET | Refills: 0 | Status: SHIPPED | OUTPATIENT
Start: 2018-03-05 | End: 2018-03-22

## 2018-03-05 NOTE — MR AVS SNAPSHOT
Antoine Russo   3/5/2018 11:45 AM   Anticoagulation Therapy Visit    Description:  72 year old male   Provider:  [facility]   Department:  [facility]           INR as of 3/5/2018     Today's INR 2.1      Anticoagulation Summary as of 3/5/2018     INR goal 2.0-3.0   Today's INR 2.1   Full instructions 3/5: 2.5 mg; 3/6: 1.25 mg; 3/7: 2.5 mg   Next INR check 3/8/2018    Indications   Chronic atrial fibrillation (H) [I48.2]         Your next Anticoagulation Clinic appointment(s)     Mar 08, 2018 10:45 AM CST   Anticoagulation Visit with [facility]   [facility]    [address] [place] [address] [phone]            Mar 12, 2018 11:00 AM CDT   [facility]agulation [facility] with [place] [facility]   [facility]    [address] [facility] [address] [phone]              Contact Numbers     Please call [phone] with any problems or questions regarding your therapy.    If you need to cancel and/or reschedule your appointment please call one of the following numbers:  [facility] - [phone]  [place] - [phone]  [place] - [phone]  [facility] [phone]  [place] - [phone]            March 2018 Details    Sun Mon Tue Wed Thu Fri Sat         1               2               3                 4               5      2.5 mg   See details      6      1.25 mg         7      2.5 mg         8            9               10                 11               12               13               14               15               16               17                 18               19               20               21               22               23               24                 25               26               27               28               29               30               31                Date Details   03/05 This INR check       Date of next INR:  3/8/2018         How to take  your warfarin dose     To take:  1.25 mg Take 0.5 of a 2.5 mg tablet.    To take:  2.5 mg Take 0.5 of a 5 mg tablet.

## 2018-03-05 NOTE — PROGRESS NOTES
ANTICOAGULATION FOLLOW-UP CLINIC VISIT    Patient Name:  Antoine Russo  Date:  3/5/2018  Contact Type:  Face to Face    SUBJECTIVE:     Patient Findings     Comments Patient discharged this weekend after readmission for acut pancreatitis. Patient states he is feeling much better and denies alcohol use.  Patient reports he is going to [place] 4/9-4/16.           OBJECTIVE    INR Protime   Date Value Ref Range Status   03/05/2018 2.1 (A) 0.86 - 1.14 Final       ASSESSMENT / PLAN  INR assessment THER    Recheck INR In: 3 DAYS    INR Location Clinic      Anticoagulation Summary as of 3/5/2018     INR goal 2.0-3.0   Today's INR 2.1   Maintenance plan No maintenance plan   Full instructions 3/5: 2.5 mg; 3/6: 1.25 mg; 3/7: 2.5 mg   Plan last modified Doris Gallardo RN (3/5/2018)   Next INR check 3/8/2018   Priority INR   Target end date Indefinite    Indications   Chronic atrial fibrillation (H) [I48.2]         Anticoagulation Episode Summary     INR check location     Preferred lab     Send INR reminders to [facility]    Comments *      Anticoagulation Care Providers     Provider Role Specialty Phone number    Katie Gross MD Referring [facility] [phone]            See the Encounter Report to view Anticoagulation Flowsheet and Dosing Calendar (Go to Encounters tab in chart review, and find the Anticoagulation Therapy Visit)    Writer submitted prescription for 2.5mg tablets to [facility], patient instructed to take warfarin 2.5mg Mon/Wed and 1.25mg Tuesday. Recheck his INR Thursday.    Doris Gallardo RN

## 2018-03-07 ENCOUNTER — OFFICE VISIT (OUTPATIENT)
Dept: GASTROENTEROLOGY | Facility: CLINIC | Age: 73
End: 2018-03-07
Payer: COMMERCIAL

## 2018-03-07 VITALS
DIASTOLIC BLOOD PRESSURE: 63 MMHG | OXYGEN SATURATION: 93 % | SYSTOLIC BLOOD PRESSURE: 134 MMHG | WEIGHT: 199.7 LBS | HEART RATE: 93 BPM | HEIGHT: 70 IN | TEMPERATURE: 98.3 F | BODY MASS INDEX: 28.59 KG/M2

## 2018-03-07 DIAGNOSIS — I10 HYPERTENSION, BENIGN ESSENTIAL, GOAL BELOW 140/90: ICD-10-CM

## 2018-03-07 DIAGNOSIS — E11.42 TYPE 2 DIABETES MELLITUS WITH DIABETIC POLYNEUROPATHY, WITHOUT LONG-TERM CURRENT USE OF INSULIN (H): ICD-10-CM

## 2018-03-07 ASSESSMENT — PAIN SCALES - GENERAL: PAINLEVEL: NO PAIN (0)

## 2018-03-07 NOTE — PATIENT INSTRUCTIONS
1. EUS/ERCP : we will call you with date and time.     2. You will need a Pre-op: bring a hard copy with you to the procedure and fax copy to [phone].  - you will need a  and someone to stay with you for 24 hours. We [place] as [place].       Follow up: as needed     Please call with any questions or concerns regarding your clinic visit today.    It is a pleasure being involved in your health care.    Contacts post-consultation depending on your need:    Schedule Clinic Appointments       [phone] # 1   M-F 7:30 - 5 pm    Marianne BUSH RN Coordinator           [phone] # 3   M-F 8:00 - 5 pm  (Triage hours)     OR Procedure Scheduling              [phone]    My Chart is available 24 hours a day and is a secure way to access your records and communicate with your care team.  I strongly recommend signing up if you haven't already done so, if you are comfortable with computers.  If you would like to inquire about this or are having problems with My Chart access, you may call [phone] or go online at [email or web address].  Please allow at least 24 hours for a response and extra time on weekends and Holidays.

## 2018-03-07 NOTE — LETTER
3/7/2018      RE: Antoine Russo  [address] [address]       Pt referred by Dr Yoder for recurrent acute pancreatitis and pseudocyst    H/P from last admit below:    IMP /PLAN: 20 minutes more than 50% counseling. Post distal pancreaectromy for IPMN of tail in setting of chronic pancreatitis. Margins negative by path. Had ongoing PD leak, treated w PD stent. Did well until last 4 months, 3 admisssions for acute pancreatitis by enzymes/ CT. Has slowly enlarging small pseudcysts off tail, with read as calcifications but might be clips.No interval sx. Was drinking ETOH 4-5 per day until January.No smoke  Reviewed CT: nomral pancreas with peripancreatic inflammation on 2/25, small enlarging pseudocyst off tail less likely IPMN.    Suspect some ductal stenosis, w very small contained tail leak. REC: combo EUS to FNA and drain small cyst, assess for IPMN recurrence. THen ERCP w dual sphincterotomy, PD stent.   Certainly may have ETOH component but has stopped entirely.  Went over risks, uncertain benefit and alternatives. Pt in agreement.  Will review w Dr Plata and schedule combo EUS ERCP in next couple of weeks.      Last H/P  Antoine Russo is a 72 year old male with PMH significant for CAD, chronic Afib, GERD, hx of pancreatic cancer s/p distal pancreatectomy and splenectomy, GERD, hx of partial colectomy for treatment of diverticulitis, HLD, HTN, BPH, PVD and T2DM who now presents with epigastric and LUQ abdominal pain.              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 Will try clears, adat  Same occurred 2/26 but then had recurrent pain 2/27.was 2270 on admit then nl then yest was 1295 when having pain again. . CT abd showed pancreatitis but also ? Pseudocyst will area of fluid collection 1.8 ( up from 1.5 1/2018). Has f/u DR Beasley  3/7.      Leukocytosis  Hx of Splenectomy 2016  Previously cyst of the pancreas in 2016; had spleen taken out at the same time. WBC 16.1 on arrival.  .  - wbc down 2/26 to 11.2      Previous Alcohol Dependence  Previously drinking between 32-48 oz of beer daily with other pancreatitis exacerbation.  Reports no alcohol use in the last month. On admission, slight HTN and tachycardia. Not tremulous      Hypertension  - continue PTA lisinopril, metoprolol      CAD (coronary artery disease)  Peripheral vascular disease (H)  Stress testing 2009 showed inferior wall ischemia.  Cath preformed; identified moderate CAD with stenosis of 40-50% in LAD and RCA.  No stents were placed.  Echo in 01/2018 (done while in Afib with rates of 100-110); EF of 55-60%.  No RWMA.      Atrial Fibrillation  EKG on admission shows Afib.  Rate controlled.- continue PTA coumadin, inr supratherapeutic- pharm following      GERD (gastroesophageal reflux disease)  Does not use medications as an outpatient  - will give once daily IV pantoprazole for GI protection while admitted      Hyperlipidemia LDL goal <100  Not on medication as an outpatient      Type 2 diabetes mellitus with diabetic polyneuropathy, without long-term current use of insulin (H)  - hold PTA metformin/glipizide given above pancreatitis, NPO status  - high SSI ordered with hypo/hyperglycemia protocol      H/O Hemicolectomy due to megacolon as a result of C.diff colitis      Hx of C.diff colitis s/p fecal transplant      Hx of squamous cell carcinoma of the mouth  Follows at the [facility].  S/p resection with Dr. Gerson Ravi in 2012             DVT Prophylaxis: coumadin      Code Status: Full Code      Disposition: Anticipate discharge in tomorrow if tolerates po.      Katie Wyatt            Interval History      Pain gone again. Feels good    Yovanny Beasley MD

## 2018-03-07 NOTE — PROGRESS NOTES
Pt referred by Dr Yoder for recurrent acute pancreatitis and pseudocyst    H/P from last admit below:    IMP /PLAN: 20 minutes more than 50% counseling. Post distal pancreaectromy for IPMN of tail in setting of chronic pancreatitis. Margins negative by path. Had ongoing PD leak, treated w PD stent. Did well until last 4 months, 3 admisssions for acute pancreatitis by enzymes/ CT. Has slowly enlarging small pseudcysts off tail, with read as calcifications but might be clips.No interval sx. Was drinking ETOH 4-5 per day until January.No smoke  Reviewed CT: nomral pancreas with peripancreatic inflammation on 2/25, small enlarging pseudocyst off tail less likely IPMN.    Suspect some ductal stenosis, w very small contained tail leak. REC: combo EUS to FNA and drain small cyst, assess for IPMN recurrence. THen ERCP w dual sphincterotomy, PD stent.   Certainly may have ETOH component but has stopped entirely.  Went over risks, uncertain benefit and alternatives. Pt in agreement.  Will review w Dr Plata and schedule combo EUS ERCP in next couple of weeks.      Last H/P  Antoine Russo is a 72 year old male with PMH significant for CAD, chronic Afib, GERD, hx of pancreatic cancer s/p distal pancreatectomy and splenectomy, GERD, hx of partial colectomy for treatment of diverticulitis, HLD, HTN, BPH, PVD and T2DM who now presents with epigastric and LUQ abdominal pain.              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 Will try clears, adat  Same occurred 2/26 but then had recurrent pain 2/27.was 2270 on admit then nl then yest was 1295 when having pain again. . CT abd showed pancreatitis but also ? Pseudocyst will area of fluid collection 1.8 ( up from 1.5 1/2018). Has f/u DR Beasley 3/7.      Leukocytosis  Hx of Splenectomy 2016  Previously cyst of the pancreas in 2016; had  spleen taken out at the same time. WBC 16.1 on arrival.  .  - wbc down 2/26 to 11.2      Previous Alcohol Dependence  Previously drinking between 32-48 oz of beer daily with other pancreatitis exacerbation.  Reports no alcohol use in the last month. On admission, slight HTN and tachycardia. Not tremulous      Hypertension  - continue PTA lisinopril, metoprolol      CAD (coronary artery disease)  Peripheral vascular disease (H)  Stress testing 2009 showed inferior wall ischemia.  Cath preformed; identified moderate CAD with stenosis of 40-50% in LAD and RCA.  No stents were placed.  Echo in 01/2018 (done while in Afib with rates of 100-110); EF of 55-60%.  No RWMA.      Atrial Fibrillation  EKG on admission shows Afib.  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  S/p resection with Dr. Gerson Ravi in 2012             DVT Prophylaxis: coumadin      Code Status: Full Code      Disposition: Anticipate discharge in tomorrow if tolerates po.      Katie Wyatt            Interval History      Pain gone again. Feels good

## 2018-03-07 NOTE — NURSING NOTE
"Chief Complaint   Patient presents with     Clinic Care Coordination - Initial     New pt consult, pancreatitis.        Vitals:    03/07/18 1156   BP: 134/63   Pulse: 93   Temp: 98.3  F (36.8  C)   TempSrc: Oral   SpO2: 93%   Weight: 199 lb 11.2 oz   Height: 5' 10\"       Body mass index is 28.65 kg/(m^2).    Gilberto MONCADA LPN                     "

## 2018-03-07 NOTE — MR AVS SNAPSHOT
After Visit Summary   3/7/2018    Antoine Russo    MRN: [id number]           Patient Information     Date Of Birth          1945        Visit Information        Provider Department      3/7/2018 12:00 PM Yovanny Beasley MD [facility] Pancreas and Biliary        Today's Diagnoses     Recurrent pancreatitis (H)    -  1      Care Instructions    1. EUS/ERCP : we will call you with date and time.     2. You will need a Pre-op: bring a hard copy with you to the procedure and fax copy to [phone].  - you will need a  and someone to stay with you for 24 hours. We [place] as [place].       Follow up: as needed     Please call with any questions or concerns regarding your clinic visit today.    It is a pleasure being involved in your health care.    Contacts post-consultation depending on your need:    Schedule Clinic Appointments       [phone] # 1   M-F 7:30 - 5 pm    Marianne BUSH RN Coordinator           [phone] # 3   M-F 8:00 - 5 pm  (Triage hours)     OR Procedure Scheduling              [phone]    My Chart is available 24 hours a day and is a secure way to access your records and communicate with your care team.  I strongly recommend signing up if you haven't already done so, if you are comfortable with computers.  If you would like to inquire about this or are having problems with My Chart access, you may call [phone] or go online at [email or web address].  Please allow at least 24 hours for a response and extra time on weekends and Holidays.              Follow-ups after your visit        Your next 10 appointments already scheduled     Mar 08, 2018 10:45 AM CST   Anticoagulation Visit with NB ANTI COAG   [facility] ([facility])    [address] [place] [id number] [phone]            Mar 12, 2018 11:00 AM CDT   Anticoagulation Visit with [place] ANTI BROCK    [facility] ([facility])    [address]  [facility] [address]   [phone]            Mar 15, 2018 11:15 AM CDT   Anticoagulation Visit with [place] ANTI COAG   [facility] ([facility])    [address]  [facility] [address]   [phone]            Mar 30, 2018  8:00 AM CDT   Office Visit with Katie Gross MD   [facility] ([facility])    [address] [facility] [address]   [phone]           Bring a current list of meds and any records pertaining to this visit. For Physicals, please bring immunization records and any forms needing to be filled out. Please arrive 10 minutes early to complete paperwork.              Future tests that were ordered for you today     Open Future Orders        Priority Expected Expires Ordered    UPPER EUS Routine  4/21/2018 3/7/2018            Who to contact     Please call your clinic at [phone] to:    Ask questions about your health    Make or cancel appointments    Discuss your medicines    Learn about your test results    Speak to your doctor            Additional Information About Your Visit        [employer] Information     [employer] gives you secure access to your electronic health record. If you see a primary care provider, you can also send messages to your care team and make appointments. If you have questions, please call your primary care clinic.  If you do not have a primary care provider, please call [phone] and they will assist you.      [employer] is an electronic gateway that provides easy, online access to your medical records. With [employer], you can request a clinic appointment, read your test results, renew a prescription or communicate with your care team.     To access your existing account, please contact your [facility] or call [phone] for assistance.        Care EveryWhere ID     This is your Care  "EveryWhere ID. This could be used by other organizations to access your [place] medical records  [id number]        Your Vitals Were     Pulse Temperature Height Pulse Oximetry BMI (Body Mass Index)       93 98.3  F (36.8  C) (Oral) 1.778 m (5' 10\") 93% 28.65 kg/m2        Blood Pressure from Last 3 Encounters:   03/07/18 134/63   03/01/18 138/64   02/01/18 135/70    Weight from Last 3 Encounters:   03/07/18 90.6 kg (199 lb 11.2 oz)   02/28/18 87.3 kg (192 lb 7.4 oz)   01/30/18 92.1 kg (203 lb)              We Performed the Following     ERCP          Today's Medication Changes          These changes are accurate as of 3/7/18  1:02 PM.  If you have any questions, ask your nurse or doctor.               These medicines have changed or have updated prescriptions.        Dose/Directions    gabapentin 300 MG capsule   Commonly known as:  NEURONTIN   This may have changed:    - how much to take  - how to take this  - when to take this  - additional instructions   Used for:  Type 2 diabetes mellitus with diabetic polyneuropathy, without long-term current use of insulin (H)        Take 1 tablet (300 mg) every night for 3 days, then if needed, go to two at HS   Quantity:  60 capsule   Refills:  3       metoprolol succinate 50 MG 24 hr tablet   Commonly known as:  TOPROL-XL   This may have changed:  when to take this   Used for:  Hypertension, benign essential, goal below 140/90        Dose:  50 mg   Take 1 tablet (50 mg) by mouth daily   Quantity:  90 tablet   Refills:  3       multivitamin, therapeutic with minerals Tabs tablet   This may have changed:  when to take this   Used for:  Surgery aftercare        Dose:  1 tablet   Take 1 tablet by mouth daily.   Quantity:  30 each   Refills:  1                Primary Care Provider Office Phone # Fax #    Katie Gross [phone] [address] [place] [id number]        Equal Access to Services     ABBEY WALTON AH: charli Martinez " leila blockmaricruz vidhyakimo, koffi davidson ah. So [facility] [phone].    ATENCIÓN: Si elli wright, tiene a snyder disposición servicios gratuitos de asistencia lingüística. Kia al [phone].    We comply with applicable federal civil rights laws and [place] laws. We do not discriminate on the basis of race, color, national origin, age, disability, sex, sexual orientation, or gender identity.            Thank you!     Thank you for choosing [facility] PANCREAS AND BILIARY  for your care. Our goal is always to provide you with excellent care. Hearing back from our patients is one way we can continue to improve our services. Please take a few minutes to complete the written survey that you may receive in the mail after your visit with us. Thank you!             Your Updated Medication List - Protect others around you: Learn how to safely use, store and throw away your medicines at www.disposemymeds.org.          This list is accurate as of 3/7/18  1:02 PM.  Always use your most recent med list.                   Brand Name Dispense Instructions for use Diagnosis    gabapentin 300 MG capsule    NEURONTIN    60 capsule    Take 1 tablet (300 mg) every night for 3 days, then if needed, go to two at     Type 2 diabetes mellitus with diabetic polyneuropathy, without long-term current use of insulin (H)       glipiZIDE 2.5 MG 24 hr tablet    glipiZIDE XL    90 tablet    Take 1 tablet (2.5 mg) by mouth every morning    Type 2 diabetes mellitus with diabetic polyneuropathy, without long-term current use of insulin (H)       lisinopril 10 MG tablet    PRINIVIL/ZESTRIL    90 tablet    Take 1 tablet (10 mg) by mouth daily    Persistent proteinuria       metFORMIN 500 MG 24 hr tablet    GLUCOPHAGE-XR    360 tablet    Take 2 tablets (1,000 mg) by mouth 2 times daily (with meals)    Type 2 diabetes mellitus with diabetic polyneuropathy, without long-term current use of insulin (H)       metoprolol  succinate 50 MG 24 hr tablet    TOPROL-XL    90 tablet    Take 1 tablet (50 mg) by mouth daily    Hypertension, benign essential, goal below 140/90       multivitamin, therapeutic with minerals Tabs tablet     30 each    Take 1 tablet by mouth daily.    Surgery aftercare       tamsulosin 0.4 MG capsule    FLOMAX    90 capsule    Take 1 capsule (0.4 mg) by mouth daily    Urgency of urination, Hypertrophy of prostate without urinary obstruction       warfarin 2.5 MG tablet    COUMADIN    90 tablet    Take 2 tablets (5 mg) by mouth daily No maintenance established or As directed by Anticoagulation Clinic    Chronic atrial fibrillation (H)

## 2018-03-07 NOTE — LETTER
3/7/2018       RE: Antoine Russo  [address] [address]     Dear Colleague,    Thank you for referring your patient, Antoine Russo, to the [facility] PANCREAS AND BILIARY at [facility]. Please see a copy of my visit note below.    Pt referred by Dr Yoder for recurrent acute pancreatitis and pseudocyst    H/P from last admit below:    IMP /PLAN: 20 minutes more than 50% counseling. Post distal pancreaectromy for IPMN of tail in setting of chronic pancreatitis. Margins negative by path. Had ongoing PD leak, treated w PD stent. Did well until last 4 months, 3 admisssions for acute pancreatitis by enzymes/ CT. Has slowly enlarging small pseudcysts off tail, with read as calcifications but might be clips.No interval sx. Was drinking ETOH 4-5 per day until January.No smoke  Reviewed CT: nomral pancreas with peripancreatic inflammation on 2/25, small enlarging pseudocyst off tail less likely IPMN.    Suspect some ductal stenosis, w very small contained tail leak. REC: combo EUS to FNA and drain small cyst, assess for IPMN recurrence. THen ERCP w dual sphincterotomy, PD stent.   Certainly may have ETOH component but has stopped entirely.  Went over risks, uncertain benefit and alternatives. Pt in agreement.  Will review w Dr Plata and schedule combo EUS ERCP in next couple of weeks.      Last H/P  Antoine Russo is a 72 year old male with PMH significant for CAD, chronic Afib, GERD, hx of pancreatic cancer s/p distal pancreatectomy and splenectomy, GERD, hx of partial colectomy for treatment of diverticulitis, HLD, HTN, BPH, PVD and T2DM who now presents with epigastric and LUQ abdominal pain.              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 Will try clears, adat  Same occurred 2/26 but then had recurrent  pain 2/27.was 2270 on admit then nl then yest was 1295 when having pain again. . CT abd showed pancreatitis but also ? Pseudocyst will area of fluid collection 1.8 ( up from 1.5 1/2018). Has f/u DR Beasley 3/7.      Leukocytosis  Hx of Splenectomy 2016  Previously cyst of the pancreas in 2016; had spleen taken out at the same time. WBC 16.1 on arrival.  .  - wbc down 2/26 to 11.2      Previous Alcohol Dependence  Previously drinking between 32-48 oz of beer daily with other pancreatitis exacerbation.  Reports no alcohol use in the last month. On admission, slight HTN and tachycardia. Not tremulous      Hypertension  - continue PTA lisinopril, metoprolol      CAD (coronary artery disease)  Peripheral vascular disease (H)  Stress testing 2009 showed inferior wall ischemia.  Cath preformed; identified moderate CAD with stenosis of 40-50% in LAD and RCA.  No stents were placed.  Echo in 01/2018 (done while in Afib with rates of 100-110); EF of 55-60%.  No RWMA.      Atrial Fibrillation  EKG on admission shows Afib.  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  S/p resection with Dr. Gerson Ravi in 2012             DVT Prophylaxis: coumadin      Code Status: Full Code      Disposition: Anticipate discharge in tomorrow if tolerates po.      Katie  Edmundo            Interval History      Pain gone again. Feels good    Again, thank you for allowing me to participate in the care of your patient.      Sincerely,    Yovanny Beasley MD

## 2018-03-08 ENCOUNTER — APPOINTMENT (OUTPATIENT)
Dept: FAMILY MEDICINE | Facility: CLINIC | Age: 73
End: 2018-03-08
Payer: COMMERCIAL

## 2018-03-08 ENCOUNTER — TELEPHONE (OUTPATIENT)
Dept: ANTICOAGULATION | Facility: CLINIC | Age: 73
End: 2018-03-08

## 2018-03-08 ENCOUNTER — ANTICOAGULATION THERAPY VISIT (OUTPATIENT)
Dept: ANTICOAGULATION | Facility: CLINIC | Age: 73
End: 2018-03-08
Payer: COMMERCIAL

## 2018-03-08 ENCOUNTER — TELEPHONE (OUTPATIENT)
Dept: FAMILY MEDICINE | Facility: CLINIC | Age: 73
End: 2018-03-08

## 2018-03-08 DIAGNOSIS — E11.42 TYPE 2 DIABETES MELLITUS WITH DIABETIC POLYNEUROPATHY, WITHOUT LONG-TERM CURRENT USE OF INSULIN (H): ICD-10-CM

## 2018-03-08 DIAGNOSIS — I48.20 CHRONIC ATRIAL FIBRILLATION (H): ICD-10-CM

## 2018-03-08 LAB — INR POINT OF CARE: 2.2 (ref 0.86–1.14)

## 2018-03-08 PROCEDURE — 36416 COLLJ CAPILLARY BLOOD SPEC: CPT

## 2018-03-08 PROCEDURE — 99207 ZZC NO CHARGE NURSE ONLY: CPT

## 2018-03-08 PROCEDURE — 85610 PROTHROMBIN TIME: CPT | Mod: QW

## 2018-03-08 RX ORDER — METOPROLOL SUCCINATE 50 MG/1
50 TABLET, EXTENDED RELEASE ORAL 2 TIMES DAILY
Qty: 180 TABLET | Refills: 3 | Status: SHIPPED | OUTPATIENT
Start: 2018-03-08 | End: 2018-05-22

## 2018-03-08 RX ORDER — GABAPENTIN 300 MG/1
CAPSULE ORAL
Qty: 180 CAPSULE | Refills: 1 | Status: SHIPPED | OUTPATIENT
Start: 2018-03-08 | End: 2018-08-23

## 2018-03-08 RX ORDER — GABAPENTIN 300 MG/1
CAPSULE ORAL
Qty: 60 CAPSULE | Refills: 3 | Status: CANCELLED | OUTPATIENT
Start: 2018-03-08

## 2018-03-08 NOTE — TELEPHONE ENCOUNTER
[facility]- Need for Clarification  Per pt states taking Twice Daily. Needs new script sent to pharmacy as they only have one daily.  Janie Orn Station Sec

## 2018-03-08 NOTE — MR AVS SNAPSHOT
Antoine Russo   3/8/2018 10:45 AM   Anticoagulation Therapy Visit    Description:  72 year old male   Provider:  NB ANTI COAG   Department:  Nb Anticoag           INR as of 3/8/2018     Today's INR 2.2      Anticoagulation Summary as of 3/8/2018     INR goal 2.0-3.0   Today's INR 2.2   Full instructions 1.25 mg on Tue, Sat; 2.5 mg all other days   Next INR check 3/15/2018    Indications   Chronic atrial fibrillation (H) [I48.2]         Your next Anticoagulation Clinic appointment(s)     Mar 15, 2018 11:15 AM CDT   Anticoagulation Visit with [place] ANTI COAG   [facility] ([facility])    [address] [facility] [id number] [phone]            Mar 22, 2018 11:30 AM CDT   Anticoagulation Visit with NB ANTI COAG   [facility] ([facility])    [address] [place] [address] [phone]              Contact Numbers     Please call [phone] with any problems or questions regarding your therapy.    If you need to cancel and/or reschedule your appointment please call one of the following numbers:  [facility] - [phone]  [place] - [phone]  [facility] [phone]  \[facility]\"" [phone]  [place] - [phone]            March 2018 Details    Sun Mon Tue Wed Thu Fri Sat         1               2               3                 4               5               6               7               8      2.5 mg   See details      9      2.5 mg         10      1.25 mg           11      2.5 mg         12      2.5 mg         13      1.25 mg         14      2.5 mg         15            16               17                 18               19               20               21               22               23               24                 25               26               27               28               29               30               31                Date Details   03/08 This INR check       Date of next INR:   3/15/2018         How to take your warfarin dose     To take:  1.25 mg Take 0.5 of a 2.5 mg tablet.    To take:  2.5 mg Take 1 of the 2.5 mg tablets.

## 2018-03-08 NOTE — TELEPHONE ENCOUNTER
Antoine is scheduled to have ERCP completed on 3-29-18 and will need to hold warfarin for 5 days.   Patient is currently on warfarin for atrial fib. Of note, he is brand new to warfarin and just started on 2-1-2018. His [id number] score is 4.  Current CHEST guidelines suggest considering bridging for those with high thrombotic risk.   While the patient is off warfarin, would you recommend the patient use enoxaparin injections as a bridge? If yes, would you like the prophylactic dose (40mg daily) or the therapeutic dose (1mg/kg twice daily)? Should the patient use enoxaparin both before and after the procedure or only afterwards?  CURRENT BRIDGING GUIDELINES  *NOTE: This does not take into consideration the bleeding risk of the procedure.   To calculate HASBLED score click HERE  Pre-Procedural bridging is not needed for most patient's except for the following:    VTE within the previous month    Prior history of recurrent VTE during anticoagulation therapy interruption    Underingoing a procedure with high inherent risk for VTE (ie. Joint replacement, major abdominal cancer resection)     Perioperative Thrombotic Risk Stratification    High Thrombotic Risk Moderate Thrombotic Risk Low Thrombotic Risk     Mechanical Heart Valve   Any mitral valve prosthesis    Any caged-ball or tilting disk aortic valve prothesis    Stroke or TIA within 6 months   Bileaflet aortic valve prothesis and one or more of the following risk factors: Afib, prior stroke or TIA, hypertension, diabetes, CHF, age >75 years   Bileaflet aortic valve prothesis without Afib and no other risk factors for stroke     Atrial Fibrillation   CHADS2-VASc score 7 to 9    Stroke or TIA within 3 months    Rheumatic vavlular heart disease     CHADS2-VASc score of 5 to 6   CHADS2-VASc score of 4 or less (assuming no prior stroke or TIA)       VTE   VTE within 3 months    Severe thrombophilia (eg. Deficiency of protein C, protein S, or antithrombin;  antiphospholipid antibodies; Homozygous Factor V Leiden or Prothrombin Gene Mutation; muliple abormalities)   VTE within the past 3-12 months    Non-severe thrombophilia (eg. Heterozygous Factor V Leiden or Prothrombin Gene Mutation)    Recurrent VTE    Active cancer (treated within 6 months or palliative)   VTE >12 months and no other risk factors    For additional Anticoagulation Bridging Guidelines -- Click HERE    Please respond to the Antico pool ([id number]) so all staff are aware of the plan. The Anticoagulation Clinic will order any necessary medications and contact the patient with a written plan regarding the upcoming procedure. Thank you!  Anticoagulation Clinic Staff  Phone: [phone]  Fax: [phone]  Pool # [id number]

## 2018-03-08 NOTE — TELEPHONE ENCOUNTER
gabapentin (NEURONTIN) 300 MG capsule      Last Written Prescription Date:  10/10/17  Last Fill Quantity: 60,   # refills: 3  Last Office Visit: 1/30/18  Future Office visit:    Next 5 appointments (look out 90 days)     Mar 30, 2018  8:00 AM CDT   Office Visit with Katie Gross MD   [facility] ([facility])    [address] [place] [address]   [phone]                   Routing refill request to provider for review/approval because:  Drug not on the [facility], [facility] or [facility] refill protocol or controlled substance

## 2018-03-08 NOTE — PROGRESS NOTES
ADDENDUM: Per Dr. Gross, patient does not need to bridge for upcoming ERCP. He can just hold his warfarin for 5 days. Please let him know this at next appointment on 3-15-18.    DAVIDA Phelps, RN    ANTICOAGULATION FOLLOW-UP CLINIC VISIT    Patient Name:  Antoine Russo  Date:  3/8/2018  Contact Type:  Face to Face    SUBJECTIVE:     Patient Findings     Positives Dental/Other procedures (ERCP on 3-29-18), Initiation of therapy (2-1-18)    Comments Writer sent phone message to PCP to see if patient needs to bridge while holding warfarin for ERCP. He has had 13.75 mg in the last week. INR still on low end of range so will increase this by 8% and recheck in one week. This might end up being his maintenance dose. No other changes or concerns. No signs of bleeding or clots.            OBJECTIVE    INR Protime   Date Value Ref Range Status   03/08/2018 2.2 (A) 0.86 - 1.14 Final       ASSESSMENT / PLAN  INR assessment THER    Recheck INR In: 1 WEEK    INR Location Clinic      Anticoagulation Summary as of 3/8/2018     INR goal 2.0-3.0   Today's INR 2.2   Maintenance plan 1.25 mg (2.5 mg x 0.5) on Tue, Sat; 2.5 mg (2.5 mg x 1) all other days   Full instructions 1.25 mg on Tue, Sat; 2.5 mg all other days   Weekly total 15 mg   Plan last modified Aparna Kunz, RN (3/8/2018)   Next INR check 3/15/2018   Priority INR   Target end date Indefinite    Indications   Chronic atrial fibrillation (H) [I48.2]         Anticoagulation Episode Summary     INR check location     Preferred lab     Send INR reminders to [facility] [place]    Comments *      Anticoagulation Care Providers     Provider Role Specialty Phone number    Troy Long, Katie Okeefe MD Referring [facility] [phone]            See the Encounter Report to view Anticoagulation Flowsheet and Dosing Calendar (Go to Encounters tab in chart review, and find the Anticoagulation Therapy Visit)        Aparna Kunz RN

## 2018-03-15 ENCOUNTER — ANTICOAGULATION THERAPY VISIT (OUTPATIENT)
Dept: ANTICOAGULATION | Facility: CLINIC | Age: 73
End: 2018-03-15
Payer: COMMERCIAL

## 2018-03-15 DIAGNOSIS — I48.20 CHRONIC ATRIAL FIBRILLATION (H): ICD-10-CM

## 2018-03-15 LAB — INR POINT OF CARE: 2 (ref 0.86–1.14)

## 2018-03-15 PROCEDURE — 85610 PROTHROMBIN TIME: CPT | Mod: QW

## 2018-03-15 PROCEDURE — 99207 ZZC NO CHARGE NURSE ONLY: CPT

## 2018-03-15 PROCEDURE — 36416 COLLJ CAPILLARY BLOOD SPEC: CPT

## 2018-03-15 NOTE — PROGRESS NOTES
"    ANTICOAGULATION FOLLOW-UP CLINIC VISIT    Patient Name:  Antoine Russo  Date:  3/15/2018  Contact Type:  Face to Face    SUBJECTIVE:     Patient Findings     Positives No Problem Findings    Comments Patient denies any changes. Antoine is scheduled to have ERCP completed on 3-29-18 and will need to hold warfarin for 5 days. \"I would not bridge this patient, his risk for a few days is minimal.\" Katie Gross MD                   OBJECTIVE    INR Protime   Date Value Ref Range Status   03/15/2018 2.0 (A) 0.86 - 1.14 Final       ASSESSMENT / PLAN  INR assessment THER    Recheck INR In: 1 WEEK    INR Location Clinic      Anticoagulation Summary as of 3/15/2018     INR goal 2.0-3.0   Today's INR 2.0   Maintenance plan 1.25 mg (2.5 mg x 0.5) on Tue, Sat; 2.5 mg (2.5 mg x 1) all other days   Full instructions 3/24: Hold; 3/25: Hold; 3/26: Hold; 3/27: Hold; 3/28: Hold; 3/29: 5 mg; 3/30: 5 mg; Otherwise 1.25 mg on Tue, Sat; 2.5 mg all other days   Weekly total 15 mg   Plan last modified Aparna Kunz RN (3/8/2018)   Next INR check 3/22/2018   Priority INR   Target end date Indefinite    Indications   Chronic atrial fibrillation (H) [I48.2]         Anticoagulation Episode Summary     INR check location     Preferred lab     Send INR reminders to [place] PHONE [facility] POOL    Comments * Does not need to bridge for ERCP on 3-29-18      Anticoagulation Care Providers     Provider Role Specialty Phone number    Katie Gross MD Referring [facility] [phone]            See the Encounter Report to view Anticoagulation Flowsheet and Dosing Calendar (Go to Encounters tab in chart review, and find the Anticoagulation Therapy Visit)        Angela Harrell, FRANCHESCA               "

## 2018-03-15 NOTE — MR AVS SNAPSHOT
Antoine Russo   3/15/2018 11:15 AM   Anticoagulation Therapy Visit    Description:  72 year old male   Provider:  [facility]   Department:  [facility]           INR as of 3/15/2018     Today's INR 2.0      Anticoagulation Summary as of 3/15/2018     INR goal 2.0-3.0   Today's INR 2.0   Full instructions 3/24: Hold; 3/25: Hold; 3/26: Hold; 3/27: Hold; 3/28: Hold; 3/29: 5 mg; 3/30: 5 mg; Otherwise 1.25 mg on Tue, Sat; 2.5 mg all other days   Next INR check 3/22/2018    Indications   Chronic atrial fibrillation (H) [I48.2]         Your next Anticoagulation Clinic appointment(s)     Mar 22, 2018 11:30 AM CDT   [facility] with [facility]   [facility]    [address] [place] [address]   [phone]            Apr 02, 2018 10:45 AM CDT   Anticoagulation Visit with [facility]   [facility]    [address] [place] [address]   [phone]              Contact Numbers     Please call [phone] with any problems or questions regarding your therapy.    If you need to cancel and/or reschedule your appointment please call one of the following numbers:  [facility] - [phone]  [place] - [phone]  [place] - [phone]  [place] - [phone]  [place] - [phone]            March 2018 Details    Sun Mon Tue Wed Thu Fri Sat         1               2               3                 4               5               6               7               8               9               10                 11               12               13               14               15      2.5 mg   See details      16      2.5 mg         17      1.25 mg           18      2.5 mg         19      2.5 mg         20      1.25 mg         21      2.5 mg         22            23               24                 25               26               27               28               29                30               31                Date Details   03/15 This INR check       Date of next INR:  3/22/2018         How to take your warfarin dose     To take:  1.25 mg Take 0.5 of a 2.5 mg tablet.    To take:  2.5 mg Take 1 of the 2.5 mg tablets.

## 2018-03-16 ENCOUNTER — OFFICE VISIT (OUTPATIENT)
Dept: FAMILY MEDICINE | Facility: CLINIC | Age: 73
End: 2018-03-16
Payer: COMMERCIAL

## 2018-03-16 VITALS
BODY MASS INDEX: 28.41 KG/M2 | OXYGEN SATURATION: 93 % | RESPIRATION RATE: 16 BRPM | DIASTOLIC BLOOD PRESSURE: 88 MMHG | WEIGHT: 198 LBS | SYSTOLIC BLOOD PRESSURE: 120 MMHG | TEMPERATURE: 97.9 F | HEART RATE: 77 BPM

## 2018-03-16 DIAGNOSIS — Q89.01 SPLEEN ABSENT: Chronic | ICD-10-CM

## 2018-03-16 DIAGNOSIS — Z01.818 PREOP GENERAL PHYSICAL EXAM: Primary | ICD-10-CM

## 2018-03-16 DIAGNOSIS — E78.5 HYPERLIPIDEMIA LDL GOAL <100: Chronic | ICD-10-CM

## 2018-03-16 DIAGNOSIS — E11.42 TYPE 2 DIABETES MELLITUS WITH DIABETIC POLYNEUROPATHY, WITHOUT LONG-TERM CURRENT USE OF INSULIN (H): Chronic | ICD-10-CM

## 2018-03-16 DIAGNOSIS — I10 HYPERTENSION, BENIGN ESSENTIAL, GOAL BELOW 140/90: Chronic | ICD-10-CM

## 2018-03-16 DIAGNOSIS — I48.20 CHRONIC ATRIAL FIBRILLATION (H): Chronic | ICD-10-CM

## 2018-03-16 DIAGNOSIS — C04.0 MALIGNANT NEOPLASM OF ANTERIOR PORTION OF FLOOR OF MOUTH (H): Chronic | ICD-10-CM

## 2018-03-16 LAB
ALBUMIN SERPL-MCNC: 3.5 G/DL (ref 3.4–5)
ALP SERPL-CCNC: 59 U/L (ref 40–150)
ALT SERPL W P-5'-P-CCNC: 30 U/L (ref 0–70)
ANION GAP SERPL CALCULATED.3IONS-SCNC: 6 MMOL/L (ref 3–14)
AST SERPL W P-5'-P-CCNC: 23 U/L (ref 0–45)
BILIRUB SERPL-MCNC: 0.4 MG/DL (ref 0.2–1.3)
BUN SERPL-MCNC: 21 MG/DL (ref 7–30)
CALCIUM SERPL-MCNC: 9.2 MG/DL (ref 8.5–10.1)
CHLORIDE SERPL-SCNC: 101 MMOL/L (ref 94–109)
CHOLEST SERPL-MCNC: 143 MG/DL
CO2 SERPL-SCNC: 27 MMOL/L (ref 20–32)
CREAT SERPL-MCNC: 1.11 MG/DL (ref 0.66–1.25)
ERYTHROCYTE [DISTWIDTH] IN BLOOD BY AUTOMATED COUNT: 14.2 % (ref 10–15)
GFR SERPL CREATININE-BSD FRML MDRD: 65 ML/MIN/1.7M2
GLUCOSE SERPL-MCNC: 142 MG/DL (ref 70–99)
HBA1C MFR BLD: 7.7 % (ref 4.3–6)
HCT VFR BLD AUTO: 42.8 % (ref 40–53)
HDLC SERPL-MCNC: 30 MG/DL
HGB BLD-MCNC: 14 G/DL (ref 13.3–17.7)
LDLC SERPL CALC-MCNC: 69 MG/DL
MCH RBC QN AUTO: 33.2 PG (ref 26.5–33)
MCHC RBC AUTO-ENTMCNC: 32.7 G/DL (ref 31.5–36.5)
MCV RBC AUTO: 101 FL (ref 78–100)
NONHDLC SERPL-MCNC: 113 MG/DL
PLATELET # BLD AUTO: 324 10E9/L (ref 150–450)
POTASSIUM SERPL-SCNC: 4.7 MMOL/L (ref 3.4–5.3)
PROT SERPL-MCNC: 7.8 G/DL (ref 6.8–8.8)
RBC # BLD AUTO: 4.22 10E12/L (ref 4.4–5.9)
SODIUM SERPL-SCNC: 134 MMOL/L (ref 133–144)
TRIGL SERPL-MCNC: 221 MG/DL
WBC # BLD AUTO: 10.5 10E9/L (ref 4–11)

## 2018-03-16 PROCEDURE — 83036 HEMOGLOBIN GLYCOSYLATED A1C: CPT | Performed by: FAMILY MEDICINE

## 2018-03-16 PROCEDURE — 80061 LIPID PANEL: CPT | Performed by: FAMILY MEDICINE

## 2018-03-16 PROCEDURE — 99214 OFFICE O/P EST MOD 30 MIN: CPT | Performed by: FAMILY MEDICINE

## 2018-03-16 PROCEDURE — 80053 COMPREHEN METABOLIC PANEL: CPT | Performed by: FAMILY MEDICINE

## 2018-03-16 PROCEDURE — 85027 COMPLETE CBC AUTOMATED: CPT | Performed by: FAMILY MEDICINE

## 2018-03-16 PROCEDURE — 36415 COLL VENOUS BLD VENIPUNCTURE: CPT | Performed by: FAMILY MEDICINE

## 2018-03-16 NOTE — PROGRESS NOTES
[facility]  [address] [facility] [address]  [phone]  Dept: [phone]    PRE-OP EVALUATION:  Today's date: 3/16/2018    Antoine Russo (: 1945) presents for pre-operative evaluation assessment as requested by Dr. Beasley.  He requires evaluation and anesthesia risk assessment prior to undergoing surgery/procedure for treatment of Pancreatitis .    Proposed Surgery/ Procedure:ENDOSCOPIC ULTRASOUND, ESOPHAGOSCOPY / UPPER GASTROINTESTINAL TRACT   Date of Surgery/ Procedure: 3/29/2018  Time of Surgery/ Procedure:   Hospital/Surgical Facility: [facility]    Primary Physician: Katie Gross  Type of Anesthesia Anticipated: General    Patient has a Health Care Directive or Living Will:  NO    1. NO - Do you have a history of heart attack, stroke, stent, bypass or surgery on an artery in the head, neck, heart or legs?  2. NO - Do you ever have any pain or discomfort in your chest?  3. NO - Do you have a history of  Heart Failure?  4. NO - Are you troubled by shortness of breath when: walking on the level, up a slight hill or at night?  5. NO - Do you currently have a cold, bronchitis or other respiratory infection?  6. NO - Do you have a cough, shortness of breath or wheezing?  7. NO - Do you sometimes get pains in the calves of your legs when you walk?  8. NO - Do you or anyone in your family have previous history of blood clots?  9. NO - Do you or does anyone in your family have a serious bleeding problem such as prolonged bleeding following surgeries or cuts?  10. NO - Have you ever had problems with anemia or been told to take iron pills?  11. NO - Have you had any abnormal blood loss such as black, tarry or bloody stools, or abnormal vaginal bleeding?  12. NO - Have you ever had a blood transfusion?  13. NO - Have you or any of your relatives ever had problems with anesthesia?  14. NO - Do you have sleep apnea, excessive snoring or daytime drowsiness?  15. NO - Do you have  any prosthetic heart valves?  16. NO - Do you have prosthetic joints?  17. NO - Is there any chance that you may be pregnant?      HPI:     HPI related to upcoming procedure: multiple episodes of pancreatitis, initially thought to be due to alcohol, but had without drinking as well. Thought to be blocked ducts.       See problem list for active medical problems.  Problems all longstanding and stable, except as noted/documented.  See ROS for pertinent symptoms related to these conditions.                                                                                                  .  DIABETES - Patient has a longstanding history of DiabetesType Type II . Patient is being treated with oral agents and denies significant side effects. Control has been good. Complicating factors include but are not limited to: HTN .  Lab Results   Component Value Date    A1C 8.1 01/23/2018    A1C 7.0 10/10/2017    A1C 6.4 07/13/2017    A1C 8.9 04/04/2017    A1C 8.0 01/03/2017                                                                                                                  .  HYPERTENSION - Patient has longstanding history of HTN , currently denies any symptoms referable to elevated blood pressure. Specifically denies chest pain, palpitations, dyspnea, orthopnea, PND or peripheral edema. Blood pressure readings have been in normal range. Current medication regimen is as listed below. Patient denies any side effects of medication.                                                                                                                                                                                          .  HYPERLIPIDEMIA - Patient has a long history of significant Hyperlipidemia requiring medication for treatment with recent good control. Patient reports no problems or side effects with the medication.                                                   Recent Labs   Lab Test  07/13/17   0828  07/15/16   0904    04/14/15   0853  05/15/14   0832   CHOL  170  180   < >  144  165   HDL  30*  43   < >  27*  30*   LDL  100*  110*   < >  76  75   TRIG  200*  134   < >  206*  302*   CHOLHDLRATIO   --    --    --   5.3*  5.0    < > = values in this interval not displayed.                                                                                                           .  A-FIB - Patient has a longstanding history of chronic A-fib currently on rate control . Patient does take coumadin for stroke prevention and denies significant symptoms of lightheadedness, palpitations or dyspnea.                                                                                                                                          .    MEDICAL HISTORY:     Patient Active Problem List    Diagnosis Date Noted     Anticipated difficulty with intubation 05/23/2017     Priority: High     Class: Chronic     Difficult two hands mask ventilation, intubated multiple times asleep with video laryngoscope. H/o tongue cancer surgery.        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 Please refer if Care Coordination services are needed.    Presenting Problem Signs  and Symptoms Treatment Plan   Questions or concerns   during clinic hours   I will call my clinic directly:  [address], [place] [address]  [phone].   Questions or concerns outside clinic hours   I will call the 24 hour nurse line at   [phone] or [facility].   Need to schedule an appointment   I will call the 24 hour scheduling team at [phone] or my clinic directly at [phone].   Same day treatment     I will call my clinic first, nurse line if after hours, urgent care and express care if needed.   Clinic care coordination services (regular clinic hours)   I will call a clinic care coordinator directly:     Shelia Emaneul RN CCM  [phone]    JAY Estes:    [phone]    Or call my clinic at [phone] and ask to speak with care coordination.   Crisis Services: Behavioral or Mental Health  [facility] 24 Hour Phone Line  [phone]    [facility] 24 Hour Crisis Services  [phone]    [facility] (Behavioral Health Providers) Network [phone]    [facility]   [phone]     Emergency treatment -- Immediately    [facility]All 911                Clostridium difficile enterocolitis 12/18/2012     Priority: Medium     Groin fluid collection 12/15/2012     Priority: Medium     CT 12/12- New (since 5/11) collection measuring at least 2.3 cm in diameter and 6.5 cm in length within the right inguinal canal. 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 Gerson Ravi and colleguchi at the  in 2012  NAD 2017  CT scan of the neck every 2-3 years.  - Thyroid labs  yearly.  - Carotid ultrasound in three to four years to evaluate for stenosis.       Colon polyp 08/26/2011     Priority: Medium     Colonoscopy 8/2011-A sessile polyp was found in the cecum. The polyp was 6 mm in size. The polyp was removed with a hot snare. Resection and retrieval were complete. A sessile polyp was found in the proximal transverse colon. The polyp was 15 mm in size. The polyp was removed with a hot snare. Resection and retrieval were complete. A sessile polyp was found in the sigmoid colon. The polyp was 5 mm in size       Advanced directives, counseling/discussion 06/03/2011     Priority: Medium     Hyperlipidemia LDL goal <100 10/31/2010     Priority: Medium     CAD (coronary artery disease) 05/26/2009     Priority: Medium     Stress testing 2009 showed inferior wall ischemia.  Cath preformed; identified moderate CAD with stenosis of 40-50% in LAD and RCA.  No stents were placed.  Echo in 01/2018 (done while in Afib with rates of 100-110); EF of 55-60%.  No RWMA.       GERD (gastroesophageal reflux disease) 05/26/2009     Priority: Medium     Hypertrophy of breast 09/25/2007     Priority: Medium     Diverticulitis of colon 07/17/2007     Priority: Medium     Colitis on CT Scan 5/2011- [place]  Colonoscopy 8/2011 Diverticulitis - Multiple small and large-mouthed diverticula were found in the mid sigmoid colon and at the hepatic flexure. There was narrowing of the colon in association with the diverticular opening. Nena-diverticular erythema was seen. There was evidence of an impacted diverticulum. Purulent discharge was seen in association with the diverticlar opening. Biopsies were taken with a cold forceps for histology. Multiple small and large-mouthed diverticula were found in the sigmoid colon, in the descending colon, in the transverse colon and in the ascending colon.   Hospitalized diverticulitis 12/12           PERS HX TOBACCO USE - quit in 11/06 with chantix 03/15/2007     Priority: Medium      Hypertension, benign essential, goal below 140/90 11/07/2005     Priority: Medium     Patient has only fair bp control and with family history of diabetes will all ace if lab indicated also has bph and may op for psa and not digital exam next yr        Pain in joint, shoulder region 11/07/2005     Priority: Medium     Hypertrophy of prostate without urinary obstruction 11/07/2005     Priority: Medium     Problem list name updated by automated process. 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     Noninfectious ileitis      Past Surgical History:   Procedure Laterality Date     BREAST SURGERY  2008    right breast mass benign     COLONOSCOPY      multiple polyps removed     COLONOSCOPY  8/24/2011    Procedure:COMBINED COLONOSCOPY, REMOVE TUMOR/POLYP/LESION BY SNARE; Surgeon:MILEY ARBOLEDA; [facility]     COLONOSCOPY  12/17/2012    Procedure: COLONOSCOPY;;  Surgeon: Leon Maurer MD;  [facility]     COLONOSCOPY  12/18/2012    Procedure: COLONOSCOPY;;  Surgeon: Leon Maurer MD;  [facility]     DENERVATION OF SPERMATIC CORD MICROSURGICAL Left 5/23/2017    Procedure: DENERVATION OF SPERMATIC CORD MICROSURGICAL;;  Surgeon: Marcio Aggarwal MD;  [facility]     DISSECTION RADICAL NECK BILATERAL  8/2/2012    Procedure: DISSECTION RADICAL NECK BILATERAL;;  Surgeon: Yung Alvares MD;  [facility]     ENDOSCOPIC RETROGRADE CHOLANGIOPANCREATOGRAM N/A 5/10/2016    Procedure: COMBINED ENDOSCOPIC RETROGRADE CHOLANGIOPANCREATOGRAPHY, PLACE TUBE/STENT;  Surgeon: Yovanny Beasley MD;  [facility]     ENDOSCOPIC ULTRASOUND UPPER GASTROINTESTINAL TRACT (GI) N/A 2/3/2016     Procedure: ENDOSCOPIC ULTRASOUND, ESOPHAGOSCOPY / UPPER GASTROINTESTINAL TRACT (GI);  Surgeon: Grabiel Plata MD;  [facility]     ESOPHAGOSCOPY, GASTROSCOPY, DUODENOSCOPY (EGD), COMBINED N/A 2/3/2016    Procedure: COMBINED ENDOSCOPIC ULTRASOUND, ESOPHAGOSCOPY, GASTROSCOPY, DUODENOSCOPY (EGD), FINE NEEDLE ASPIRATE/BIOPSY;  Surgeon: Grabiel Plata MD;  [facility]     ESOPHAGOSCOPY, GASTROSCOPY, DUODENOSCOPY (EGD), COMBINED N/A 6/8/2016    Procedure: COMBINED ESOPHAGOSCOPY, GASTROSCOPY, DUODENOSCOPY (EGD), REMOVE FOREIGN BODY;  Surgeon: Yovanny Beasley MD;  [facility]     EXCISE LESION INTRAORAL  6/14/2012    Procedure: EXCISE LESION INTRAORAL;  Wide Local Excision Floor of Mouth, Direct Laryngoscopy, Bilateral Ida's Marsuplization, Split Thickness Skin Graft from right Thigh  Latex Safe;  Surgeon: Gerson Ravi MD;  [facility]     EXCISE LESION INTRAORAL  8/2/2012    Procedure: EXCISE LESION INTRAORAL;  Floor of Mouth Resection, Bilateral Selective Radical Neck Dissection, Tracheostomy, Left Radial Forearm  Free Flap with Alloderm, Nasogastric Feeding Tube Placement,    * Latex Safe*;  Surgeon: Gerson Ravi MD;  [facility]     EXCISE LESION INTRAORAL  12/11/2012    Procedure: EXCISE LESION INTRAORAL;  takedown of oral flap;  Surgeon: Yung Alvares MD;  [facility]     GRAFT FREE VASCULARIZED (LOCATION)  8/2/2012    Procedure: GRAFT FREE VASCULARIZED (LOCATION);;  Surgeon: Yung Alvares MD;  [facility]     GRAFT SKIN SPLIT THICKNESS FROM EXTREMITY  6/14/2012    Procedure: GRAFT SKIN SPLIT THICKNESS FROM EXTREMITY;;  Surgeon: Gerson Ravi MD;  [facility]     LAPAROSCOPIC ILEOSTOMY TAKEDOWN  6/6/2013    Procedure: LAPAROSCOPIC ILEOSTOMY TAKEDOWN;  Laparoscopic Closure of Enterostomy, Guerda's Type with IleoRectal Anastomosis ;  Surgeon: Grabiel Riddle MD;  [facility]     LAPAROTOMY EXPLORATORY  12/20/2012    Procedure: LAPAROTOMY  EXPLORATORY;  Exploratory Laparotomy, total abdominal colectomy, ileostomy formation;  Surgeon: Miquel Cannon MD;  [facility]     LARYNGOSCOPY  6/14/2012    Procedure: LARYNGOSCOPY;;  Surgeon: Gerson Ravi MD;  [facility]     ORTHOPEDIC SURGERY      ganglian cyst left ankle     PANCREATECTOMY, SPLENECTOMY N/A 3/10/2016    Procedure: PANCREATECTOMY, SPLENECTOMY;  Surgeon: Nael Abel MD;  [facility]     SHOULDER SURGERY  2006, 2008    2006- right rotator cuff, 2008 bone spur on left. Dr. Hdez     VARICOCELECTOMY Left 5/23/2017    Procedure: VARICOCELECTOMY;  Left Varicocele Repair, Denervation of Left Testis;  Surgeon: Marcio Aggarwal MD;  [facility]     Current Outpatient Prescriptions   Medication Sig Dispense Refill     metoprolol succinate (TOPROL-XL) 50 MG 24 hr tablet Take 1 tablet (50 mg) by mouth 2 times daily 180 tablet 3     gabapentin (NEURONTIN) 300 MG capsule Take 2 tablest (600 mg) every night 180 capsule 1     warfarin (COUMADIN) 2.5 MG tablet Take 2 tablets (5 mg) by mouth daily No maintenance established or As directed by Anticoagulation Clinic 90 tablet 0     metFORMIN (GLUCOPHAGE-XR) 500 MG 24 hr tablet Take 2 tablets (1,000 mg) by mouth 2 times daily (with meals) 360 tablet 1     tamsulosin (FLOMAX) 0.4 MG capsule Take 1 capsule (0.4 mg) by mouth daily 90 capsule 3     lisinopril (PRINIVIL/ZESTRIL) 10 MG tablet Take 1 tablet (10 mg) by mouth daily 90 tablet 1     glipiZIDE (GLIPIZIDE XL) 2.5 MG 24 hr tablet Take 1 tablet (2.5 mg) by mouth every morning 90 tablet 3     multivitamin, therapeutic with minerals (THERA-VIT-M) TABS Take 1 tablet by mouth daily. (Patient taking differently: Take 1 tablet by mouth every morning ) 30 each 1     OTC products: None, except as noted above    Allergies   Allergen Reactions     Nkda [No Known Drug Allergies]       Latex Allergy: NO    Social History   Substance Use Topics     Smoking status: Former Smoker     Packs/day: 1.00      Years: 40.00     Types: Cigarettes     Quit date: 11/24/2006     Smokeless tobacco: Never Used     Alcohol use Yes      Comment: 6-12 beers/daily for at least 40 years; now 3-4 beers daily now     History   Drug Use No       REVIEW OF SYSTEMS:   CONSTITUTIONAL: NEGATIVE for fever, chills, change in weight  ENT/MOUTH: NEGATIVE for ear, mouth and throat problems  RESP: NEGATIVE for significant cough or SOB  CV: NEGATIVE for chest pain, palpitations or peripheral edema  GI: NEGATIVE for nausea, abdominal pain, heartburn, or change in bowel habits  : No dysuria, urinary frequency or urgency.     EXAM:   /88 (BP Location: Left arm)  Pulse 77  Temp 97.9  F (36.6  C) (Tympanic)  Resp 16  Wt 198 lb (89.8 kg)  SpO2 93%  BMI 28.41 kg/m2  GENERAL APPEARANCE: healthy, alert and no distress  HENT: ear canals and TM's normal and nose and mouth without ulcers or lesions  RESP: lungs clear to auscultation - no rales, rhonchi or wheezes  CV: regular rate and rhythm, normal S1 S2, no S3 or S4 and no murmur, click or rub   ABDOMEN: soft, nontender, multiple scars, no HSM or masses and bowel sounds normal  NEURO: Normal strength and tone, sensory exam grossly normal, mentation intact and speech normal    DIAGNOSTICS:   EKG: appears normal, NSR,   -Left axis -anterior fascicular block.from Jose     Recent Labs   Lab Test 03/15/18 03/08/18   02/26/18   0701  02/25/18   1140   01/23/18   0645   10/10/17   0912   HGB   --    --    --   13.3  15.7   < >  13.3   < >   --    PLT   --    --    --   220  244   < >  328   < >   --    INR  2.0*  2.2*   < >  3.78*  2.93*   < >   --    --    --    NA   --    --    --   138  134   --   137   < >   --    POTASSIUM   --    --    --   4.1  4.2   --   4.2   < >   --    CR   --    --    --   0.79  0.91   --   0.77   < >   --    A1C   --    --    --    --    --    --   8.1*   --   7.0*    < > = values in this interval not displayed.       IMPRESSION:   Reason for surgery/procedure: blocked  pancreatic duct, recurrent pancreatitis    The proposed surgical procedure is considered INTERMEDIATE risk.    REVISED CARDIAC RISK INDEX  The patient has the following serious cardiovascular risks for perioperative complications such as (MI, PE, VFib and 3  AV Block):  No serious cardiac risks  INTERPRETATION: 1 risks: Class II (low risk - 0.9% complication rate)    The patient has the following additional risks for perioperative complications:  No identified additional risks      ICD-10-CM    1. Preop general physical exam Z01.818    2. Pancreatitis, recurrent (H) K86.1    3. Chronic atrial fibrillation (H) I48.2    4. Spleen absent Q89.01    5. Type 2 diabetes mellitus with diabetic polyneuropathy, without long-term current use of insulin (H) E11.42 Hemoglobin A1c     Comprehensive metabolic panel (BMP + Alb, Alk Phos, ALT, AST, Total. Bili, TP)     CBC with platelets   6. 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 Hyperlipidemia LDL goal <100 E78.5 Lipid panel reflex to direct LDL Fasting   8. Hypertension, benign essential, goal below 140/90 I10        RECOMMENDATIONS:       --Patient is to take all scheduled medications on the day of surgery EXCEPT for modifications listed below.  5 days prior hold the warfarin  Day prior to surgery, hold the lisinopril  Day of surgery, hold lisinopril, metformin, glypizide, flomax    Diabetes Medication Use    -----Hold usual oral and non-insulin diabetic meds (e.g. Metformin, Actos, Glipizide) while NPO.       Anticoagulant or Antiplatelet Medication Use  WARFARIN: Thromboembolic risk is low (e.g. Single VTE >12 months ago, A fib and CHADS<3 without prior CVA/TIA) and warfarin may be discontinued in the perioperative period        ACE Inhibitor or Angiotensin Receptor Blocker (ARB) Use  Ace inhibitor or Angiotensin Receptor Blocker (ARB) and should HOLD this medication for the 24 hours prior to surgery.      APPROVAL GIVEN to proceed with  proposed procedure, without further diagnostic evaluation       Signed Electronically by: Katie Gross MD    Copy of this evaluation report is provided to requesting physician.    [facility] Preop Guidelines

## 2018-03-16 NOTE — PATIENT INSTRUCTIONS
5 days prior hold the warfarin  Day prior to surgery, hold the lisinopril  Day of surgery, hold lisinopril, metformin, glypizide, flomax      Before Your Surgery      Call your surgeon if there is any change in your health. This includes signs of a cold or flu (such as a sore throat, runny nose, cough, rash or fever).    Do not smoke, drink alcohol or take over the counter medicine (unless your surgeon or primary care doctor tells you to) for the 24 hours before and after surgery.    If you take prescribed drugs: Follow your doctor s orders about which medicines to take and which to stop until after surgery.    Eating and drinking prior to surgery: follow the instructions from your surgeon    Take a shower or bath the night before surgery. Use the soap your surgeon gave you to gently clean your skin. If you do not have soap from your surgeon, use your regular soap. Do not shave or scrub the surgery site.  Wear clean pajamas and have clean sheets on your bed.

## 2018-03-16 NOTE — NURSING NOTE
"Chief Complaint   Patient presents with     Pre-Op Exam     ENDOSCOPIC ULTRASOUND, ESOPHAGOSCOPY / UPPER GASTROINTESTINAL TRACT (GI)       Initial /88 (BP Location: Left arm)  Pulse 77  Temp 97.9  F (36.6  C) (Tympanic)  Resp 16  Wt 198 lb (89.8 kg)  SpO2 93%  BMI 28.41 kg/m2 Estimated body mass index is 28.41 kg/(m^2) as calculated from the following:    Height as of 3/7/18: 5' 10\" (1.778 m).    Weight as of this encounter: 198 lb (89.8 kg).      Health Maintenance that is potentially due pending provider review:  Colonoscopy/FIT    Gave pt phone number/pended order to schedule mammo and/or colonoscopy(or FIT)    Is there anyone who you would like to be able to receive your results? No  If yes have patient fill out VIDYA    "

## 2018-03-16 NOTE — MR AVS SNAPSHOT
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 This includes signs of a cold or flu (such as a sore throat, runny nose, cough, rash or fever).    Do not smoke, drink alcohol or take over the counter medicine (unless your surgeon or primary care doctor tells you to) for the 24 hours before and after surgery.    If you take prescribed drugs: Follow your doctor s orders about which medicines to take and which to stop until after surgery.    Eating and drinking prior to surgery: follow the instructions from your surgeon    Take a shower or bath the night before surgery. Use the soap your surgeon gave you to gently clean your skin. If you do not have soap from your surgeon, use your regular soap. Do not shave or scrub the surgery site.  Wear clean pajamas and have clean sheets on your bed.           Follow-ups after your visit        Your next 10 appointments already scheduled     Mar 22, 2018 11:30 AM CDT   Anticoagulation Visit with NB ANTI COAG   [facility]    [address]  [facility] [address]   [phone]            Mar 29, 2018   Procedure with Yovanny Beasley MD   [facility], [place], Same Day Surgery (--)    [address]  [place] [address] [phone]            Mar 30, 2018  8:00 AM CDT   Office Visit with Katie Gross MD   [facility] ([facility])    [address] [facility] [id number] [phone]           Bring a current list of meds and any records pertaining to this visit. For Physicals, please bring immunization records and any forms needing to be filled out. Please arrive 10 minutes early to complete paperwork.            Apr 02, 2018 10:45 AM CDT   Anticoagulation Visit with NB ANTI COAG   [facility] ([facility])    [address] [facility] [address]   [phone]              Who to contact     If you have questions or need follow up information about today's clinic visit or your schedule please contact [facility] directly at [phone].  Normal or non-critical lab and imaging results will be communicated to you by [employer], letter or phone within 4 business days after the clinic has received the results. If you do not hear from us within 7 days, please contact the clinic through [employer] or phone. If you have a critical or abnormal lab result, we will notify you by phone as soon as possible.  Submit refill requests through [employer] or call your pharmacy and they will forward the refill request to us. Please allow 3 business days for your refill to be completed.          Additional Information About Your Visit        MyChart Information     [employer] gives you secure access to your electronic health record. If you see a primary care provider, you can also send messages to your care team and make appointments. If you have questions, please call your primary care clinic.  If you do not have a primary care provider, please call [phone] and  they will assist you.        Care EveryWhere ID     This is your Care EveryWhere ID. This could be used by other organizations to access your [place] medical records  [id number]        Your Vitals Were     Pulse Temperature Respirations Pulse Oximetry BMI (Body Mass Index)       77 97.9  F (36.6  C) (Tympanic) 16 93% 28.41 kg/m2        Blood Pressure from Last 3 Encounters:   03/16/18 120/88   03/07/18 134/63   03/01/18 138/64    Weight from Last 3 Encounters:   03/16/18 198 lb (89.8 kg)   03/07/18 199 lb 11.2 oz (90.6 kg)   02/28/18 192 lb 7.4 oz (87.3 kg)              We Performed the Following     CBC with platelets     Comprehensive metabolic panel (BMP + Alb, Alk Phos, ALT, AST, Total. Bili, TP)     Hemoglobin A1c     Lipid panel reflex to direct LDL Fasting          Today's Medication Changes          These changes are accurate as of 3/16/18  9:02 AM.  If you have any questions, ask your nurse or doctor.               These medicines have changed or have updated prescriptions.        Dose/Directions    multivitamin, therapeutic with minerals Tabs tablet   This may have changed:  when to take this   Used for:  Surgery aftercare        Dose:  1 tablet   Take 1 tablet by mouth daily.   Quantity:  30 each   Refills:  1                Primary Care Provider Office Phone # Fax #    Katie Gross [phone]       [address] [place] [address]        Equal Access to Services     FRAN WALTON AH: Hadii stoney Bernstein, waaxda luqadaha, qaybta kaalmada anitada, koffi fay. So [facility] [phone].    ATENCIÓN: Si habla español, tiene a snyder disposición servicios gratuitos de asistencia lingüística. Llame al [phone].    We comply with applicable federal civil rights laws and [place] laws. We do not discriminate on the basis of race, color, national origin, age, disability, sex, sexual orientation, or gender identity.            Thank you!     Thank you  for choosing [facility]  for your care. Our goal is always to provide you with excellent care. Hearing back from our patients is one way we can continue to improve our services. Please take a few minutes to complete the written survey that you may receive in the mail after your visit with us. Thank you!             Your Updated Medication List - Protect others around you: Learn how to safely use, store and throw away your medicines at www.disposemymeds.org.          This list is accurate as of 3/16/18  9:02 AM.  Always use your most recent med list.                   Brand Name Dispense Instructions for use Diagnosis    gabapentin 300 MG capsule    NEURONTIN    180 capsule    Take 2 tablest (600 mg) every night    Type 2 diabetes mellitus with diabetic polyneuropathy, without long-term current use of insulin (H)       glipiZIDE 2.5 MG 24 hr tablet    glipiZIDE XL    90 tablet    Take 1 tablet (2.5 mg) by mouth every morning    Type 2 diabetes mellitus with diabetic polyneuropathy, without long-term current use of insulin (H)       lisinopril 10 MG tablet    PRINIVIL/ZESTRIL    90 tablet    Take 1 tablet (10 mg) by mouth daily    Persistent proteinuria       metFORMIN 500 MG 24 hr tablet    GLUCOPHAGE-XR    360 tablet    Take 2 tablets (1,000 mg) by mouth 2 times daily (with meals)    Type 2 diabetes mellitus with diabetic polyneuropathy, without long-term current use of insulin (H)       metoprolol succinate 50 MG 24 hr tablet    TOPROL-XL    180 tablet    Take 1 tablet (50 mg) by mouth 2 times daily    Hypertension, benign essential, goal below 140/90       multivitamin, therapeutic with minerals Tabs tablet     30 each    Take 1 tablet by mouth daily.    Surgery aftercare       tamsulosin 0.4 MG capsule    FLOMAX    90 capsule    Take 1 capsule (0.4 mg) by mouth daily    Urgency of urination, Hypertrophy of prostate without urinary obstruction       warfarin 2.5 MG tablet    COUMADIN    90 tablet     Take 2 tablets (5 mg) by mouth daily No maintenance established or As directed by Anticoagulation Clinic    Chronic atrial fibrillation (H)

## 2018-03-22 ENCOUNTER — ANTICOAGULATION THERAPY VISIT (OUTPATIENT)
Dept: ANTICOAGULATION | Facility: CLINIC | Age: 73
End: 2018-03-22
Payer: COMMERCIAL

## 2018-03-22 DIAGNOSIS — I48.20 CHRONIC ATRIAL FIBRILLATION (H): ICD-10-CM

## 2018-03-22 LAB — INR POINT OF CARE: 2.4 (ref 0.86–1.14)

## 2018-03-22 PROCEDURE — 85610 PROTHROMBIN TIME: CPT | Mod: QW

## 2018-03-22 PROCEDURE — 36416 COLLJ CAPILLARY BLOOD SPEC: CPT

## 2018-03-22 PROCEDURE — 99207 ZZC NO CHARGE NURSE ONLY: CPT

## 2018-03-22 RX ORDER — WARFARIN SODIUM 2.5 MG/1
5 TABLET ORAL DAILY
Qty: 90 TABLET | Refills: 0 | COMMUNITY
Start: 2018-03-22 | End: 2018-03-22

## 2018-03-22 RX ORDER — WARFARIN SODIUM 2.5 MG/1
TABLET ORAL
Qty: 90 TABLET | Refills: 0 | Status: ON HOLD | COMMUNITY
Start: 2018-03-22 | End: 2018-04-18

## 2018-03-22 NOTE — MR AVS SNAPSHOT
Antoine Russo   3/22/2018 11:30 AM   Anticoagulation Therapy Visit    Description:  72 year old male   Provider:  NB ANTI COAG   Department:  [facility] Anticoag           INR as of 3/22/2018     Today's INR 2.4      Anticoagulation Summary as of 3/22/2018     INR goal 2.0-3.0   Today's INR 2.4   Full instructions 3/24: Hold; 3/25: Hold; 3/26: Hold; 3/27: Hold; 3/28: Hold; 3/29: 5 mg; 3/30: 5 mg; Otherwise 1.25 mg on Tue, Sat; 2.5 mg all other days   Next INR check 4/5/2018    Indications   Chronic atrial fibrillation (H) [I48.2]         Your next Anticoagulation Clinic appointment(s)     Apr 05, 2018 10:30 AM CDT   [facility] NB ANTI COAG   [facility]    [address] [place] [address] [phone]              Contact Numbers     Please call [phone] with any problems or questions regarding your therapy.    If you need to cancel and/or reschedule your appointment please call one of the following numbers:  [facility] - [phone]  [place] - [phone]  [place] - [phone]  [facility] [phone]  [place] - [phone]            March 2018 Details    Sun Mon Tue Wed Thu Fri Sat         1               2               3                 4               5               6               7               8               9               10                 11               12               13               14               15               16               17                 18               19               20               21               22      2.5 mg   See details      23      2.5 mg         24      Hold           25      Hold         26      Hold         27      Hold         28      Hold         29      5 mg         30      5 mg         31      1.25 mg          Date Details   03/22 This INR check               How to take your warfarin dose     To take:  1.25 mg Take 0.5 of a 2.5 mg tablet.    To take:  2.5 mg Take  1 of the 2.5 mg tablets.    To take:  5 mg Take 1 of the 5 mg tablets.    Hold Do not take your warfarin dose. See the Details table to the right for additional instructions.                April 2018 Details    Sun Mon Tue Wed Thu Fri Sat     1      2.5 mg         2      2.5 mg         3      1.25 mg         4      2.5 mg         5            6               7                 8               9               10               11               12               13               14                 15               16               17               18               19               20               21                 22               23               24               25               26               27               28                 29               30                     Date Details   No additional details    Date of next INR:  4/5/2018         How to take your warfarin dose     To take:  1.25 mg Take 0.5 of a 2.5 mg tablet.    To take:  2.5 mg Take 1 of the 2.5 mg tablets.

## 2018-03-22 NOTE — PROGRESS NOTES
ANTICOAGULATION FOLLOW-UP CLINIC VISIT    Patient Name:  Antoine Russo  Date:  3/22/2018  Contact Type:  Face to Face    SUBJECTIVE:     Patient Findings     Positives No Problem Findings    Comments Writer reviewed holding instructions and post procedure dosing with patient. He will recheck INR in one week after procedure to give time for INR to get back in range. No changes or concerns. No bleeding or signs of clots. Patient took dosing as directed.           OBJECTIVE    INR Protime   Date Value Ref Range Status   03/22/2018 2.4 (A) 0.86 - 1.14 Final       ASSESSMENT / PLAN  INR assessment THER    Recheck INR In: 2 WEEKS    INR Location Clinic      Anticoagulation Summary as of 3/22/2018     INR goal 2.0-3.0   Today's INR 2.4   Maintenance plan 1.25 mg (2.5 mg x 0.5) on Tue, Sat; 2.5 mg (2.5 mg x 1) all other days   Full instructions 3/24: Hold; 3/25: Hold; 3/26: Hold; 3/27: Hold; 3/28: Hold; 3/29: 5 mg; 3/30: 5 mg; Otherwise 1.25 mg on Tue, Sat; 2.5 mg all other days   Weekly total 15 mg   Plan last modified Aparna Kunz RN (3/8/2018)   Next INR check 4/5/2018   Priority INR   Target end date Indefinite    Indications   Chronic atrial fibrillation (H) [I48.2]         Anticoagulation Episode Summary     INR check location     Preferred lab     Send INR reminders to [facility] CLINIC POOL    Comments * Does not need to bridge for ERCP on 3-29-18      Anticoagulation Care Providers     Provider Role Specialty Phone number    Katie Gross MD Referring [facility] [phone]            See the Encounter Report to view Anticoagulation Flowsheet and Dosing Calendar (Go to Encounters tab in chart review, and find the Anticoagulation Therapy Visit)        Aparna Kunz RN

## 2018-03-28 ENCOUNTER — ANESTHESIA EVENT (OUTPATIENT)
Dept: SURGERY | Facility: CLINIC | Age: 73
End: 2018-03-28
Payer: COMMERCIAL

## 2018-03-28 ASSESSMENT — LIFESTYLE VARIABLES: TOBACCO_USE: 1

## 2018-03-28 ASSESSMENT — ENCOUNTER SYMPTOMS: DYSRHYTHMIAS: 1

## 2018-03-28 NOTE — ANESTHESIA PREPROCEDURE EVALUATION
Anesthesia Evaluation     . Pt has had prior anesthetic. Type: General and MAC (difficult mask. G2 view with [facility] MAC 4 blade)    History of anesthetic complications   - difficult intubation        ROS/MED HX    ENT/Pulmonary: Comment: Mouth cancer s/p anterior floor of mouth resection    (+)tobacco use, Past use 1 PPD for 40 years, quit 2006 packs/day  , . .    Neurologic:     (+)neuropathy - feet,     Cardiovascular: Comment: TTE 1/2018: mild LVH. EF 55-60%    [facility] 2009: 50% LAD, 10% LCx, 40-50% RCA; no stents or POBA per records    (+) hypertension--CAD, --. Taking blood thinners Pt has received instructions: . . . :. dysrhythmias (hx afib) . Previous cardiac testing date:results:date: results:ECG reviewed date:5/9/2017 results:NSR, LVH date: results:         (-) CHF, irregular heartbeat/palpitations, valvular problems/murmurs, stent and CABG   METS/Exercise Tolerance:  >4 METS   Hematologic:  - neg hematologic  ROS       Musculoskeletal:  - neg musculoskeletal ROS       GI/Hepatic:     (+) GERD Other GI/Hepatic IPMN S/P panceatectomy, splenectomy, bowel resection      Renal/Genitourinary:  - ROS Renal section negative       Endo:     (+) type II DM Not using insulin - not using insulin pump Previously admitted for DM/DKA Diabetic complications: neuropathy, Obesity, .      Psychiatric:  - neg psychiatric ROS       Infectious Disease: Comment: H/o c diff colitis - neg infectious disease ROS       Malignancy:   (+) Malignancy History of Other  Other CA oral and pancreas status post Surgery Oral tongue CA s/p glossectomy, B neck dissection        Other:    (+) No chance of pregnancy C-spine cleared: N/A, no H/O Chronic Pain,no other significant disability                    Physical Exam      Airway   Mallampati: III  TM distance: <3 FB  Neck ROM: full    Dental   Comment: No missing or chipped anterior teeth    Cardiovascular   Rhythm and rate: regular and normal  (-) no weak pulses and no murmur    Pulmonary     breath sounds clear to auscultation(+) decreased breath sounds   (-) no rhonchi                    Anesthesia Plan      History & Physical Review      ASA Status:  3 .    NPO Status:  > 8 hours    Plan for General and ETT with Intravenous induction. Maintenance will be Balanced.      Additional equipment: Videolaryngoscope GETA. PIVx1. Standard ASA monitors. IV opioids. PACU postop    Risks and benefits of anesthetic discussed with patient including sore throat, voice hoarseness, injury to vocal cords, throat, mouth, teeth, tongue, and lips from intubation; nausea/vomiting; cardiac arrest, respiratory complications, MI, stroke, blood clots, death.    Presented opportunity to answer patient and family questions. Questions addressed.        Postoperative Care      Consents  Anesthetic plan, risks, benefits and alternatives discussed with:  Patient..                          .

## 2018-03-29 ENCOUNTER — ANESTHESIA (OUTPATIENT)
Dept: SURGERY | Facility: CLINIC | Age: 73
End: 2018-03-29
Payer: COMMERCIAL

## 2018-03-29 ENCOUNTER — HOSPITAL ENCOUNTER (OUTPATIENT)
Facility: CLINIC | Age: 73
Discharge: HOME | End: 2018-03-29
Attending: INTERNAL MEDICINE | Admitting: INTERNAL MEDICINE
Payer: COMMERCIAL

## 2018-03-29 ENCOUNTER — APPOINTMENT (OUTPATIENT)
Dept: GENERAL RADIOLOGY | Facility: CLINIC | Age: 73
End: 2018-03-29
Attending: INTERNAL MEDICINE
Payer: COMMERCIAL

## 2018-03-29 ENCOUNTER — SURGERY (OUTPATIENT)
Age: 73
End: 2018-03-29

## 2018-03-29 VITALS
SYSTOLIC BLOOD PRESSURE: 134 MMHG | HEIGHT: 70 IN | RESPIRATION RATE: 16 BRPM | WEIGHT: 200.84 LBS | TEMPERATURE: 98.1 F | OXYGEN SATURATION: 92 % | BODY MASS INDEX: 28.75 KG/M2 | DIASTOLIC BLOOD PRESSURE: 81 MMHG

## 2018-03-29 LAB
ALBUMIN SERPL-MCNC: 3.2 G/DL (ref 3.4–5)
ALP SERPL-CCNC: 49 U/L (ref 40–150)
ALT SERPL W P-5'-P-CCNC: 25 U/L (ref 0–70)
AMYLASE SERPL-CCNC: 56 U/L (ref 30–110)
ANION GAP SERPL CALCULATED.3IONS-SCNC: 9 MMOL/L (ref 3–14)
AST SERPL W P-5'-P-CCNC: 12 U/L (ref 0–45)
BILIRUB SERPL-MCNC: 0.7 MG/DL (ref 0.2–1.3)
BUN SERPL-MCNC: 14 MG/DL (ref 7–30)
CALCIUM SERPL-MCNC: 9 MG/DL (ref 8.5–10.1)
CHLORIDE SERPL-SCNC: 104 MMOL/L (ref 94–109)
CO2 SERPL-SCNC: 23 MMOL/L (ref 20–32)
CREAT SERPL-MCNC: 0.86 MG/DL (ref 0.66–1.25)
ERYTHROCYTE [DISTWIDTH] IN BLOOD BY AUTOMATED COUNT: 14.9 % (ref 10–15)
GFR SERPL CREATININE-BSD FRML MDRD: 88 ML/MIN/1.7M2
GLUCOSE BLDC GLUCOMTR-MCNC: 187 MG/DL (ref 70–99)
GLUCOSE BLDC GLUCOMTR-MCNC: 192 MG/DL (ref 70–99)
GLUCOSE SERPL-MCNC: 185 MG/DL (ref 70–99)
HCT VFR BLD AUTO: 41.7 % (ref 40–53)
HGB BLD-MCNC: 13.3 G/DL (ref 13.3–17.7)
INR PPP: 1.1 (ref 0.86–1.14)
LIPASE SERPL-CCNC: 323 U/L (ref 73–393)
MCH RBC QN AUTO: 32.4 PG (ref 26.5–33)
MCHC RBC AUTO-ENTMCNC: 31.9 G/DL (ref 31.5–36.5)
MCV RBC AUTO: 102 FL (ref 78–100)
PLATELET # BLD AUTO: 220 10E9/L (ref 150–450)
POTASSIUM SERPL-SCNC: 4 MMOL/L (ref 3.4–5.3)
PROT SERPL-MCNC: 7.4 G/DL (ref 6.8–8.8)
RBC # BLD AUTO: 4.1 10E12/L (ref 4.4–5.9)
SODIUM SERPL-SCNC: 136 MMOL/L (ref 133–144)
WBC # BLD AUTO: 11.6 10E9/L (ref 4–11)

## 2018-03-29 PROCEDURE — 36000059 ZZH SURGERY LEVEL 3 EA 15 ADDTL MIN UMMC: Performed by: INTERNAL MEDICINE

## 2018-03-29 PROCEDURE — 25500064 ZZH RX 255 OP 636: Performed by: INTERNAL MEDICINE

## 2018-03-29 PROCEDURE — 40000279 XR SURGERY CARM FLUORO GREATER THAN 5 MIN W STILLS: Mod: TC

## 2018-03-29 PROCEDURE — 82150 ASSAY OF AMYLASE: CPT | Performed by: INTERNAL MEDICINE

## 2018-03-29 PROCEDURE — 25000128 H RX IP 250 OP 636: Performed by: NURSE ANESTHETIST, CERTIFIED REGISTERED

## 2018-03-29 PROCEDURE — 25000128 H RX IP 250 OP 636: Performed by: ANESTHESIOLOGY

## 2018-03-29 PROCEDURE — 37000008 ZZH ANESTHESIA TECHNICAL FEE, 1ST 30 MIN: Performed by: INTERNAL MEDICINE

## 2018-03-29 PROCEDURE — 85610 PROTHROMBIN TIME: CPT | Performed by: INTERNAL MEDICINE

## 2018-03-29 PROCEDURE — 83690 ASSAY OF LIPASE: CPT | Performed by: INTERNAL MEDICINE

## 2018-03-29 PROCEDURE — 27210794 ZZH OR GENERAL SUPPLY STERILE: Performed by: INTERNAL MEDICINE

## 2018-03-29 PROCEDURE — 36000061 ZZH SURGERY LEVEL 3 W FLUORO 1ST 30 MIN - UMMC: Performed by: INTERNAL MEDICINE

## 2018-03-29 PROCEDURE — 37000009 ZZH ANESTHESIA TECHNICAL FEE, EACH ADDTL 15 MIN: Performed by: INTERNAL MEDICINE

## 2018-03-29 PROCEDURE — 80053 COMPREHEN METABOLIC PANEL: CPT | Performed by: INTERNAL MEDICINE

## 2018-03-29 PROCEDURE — C1769 GUIDE WIRE: HCPCS | Performed by: INTERNAL MEDICINE

## 2018-03-29 PROCEDURE — C9399 UNCLASSIFIED DRUGS OR BIOLOG: HCPCS | Performed by: NURSE ANESTHETIST, CERTIFIED REGISTERED

## 2018-03-29 PROCEDURE — 25000566 ZZH SEVOFLURANE, EA 15 MIN: Performed by: INTERNAL MEDICINE

## 2018-03-29 PROCEDURE — 82962 GLUCOSE BLOOD TEST: CPT

## 2018-03-29 PROCEDURE — 85027 COMPLETE CBC AUTOMATED: CPT | Performed by: INTERNAL MEDICINE

## 2018-03-29 PROCEDURE — 71000027 ZZH RECOVERY PHASE 2 EACH 15 MINS: Performed by: INTERNAL MEDICINE

## 2018-03-29 PROCEDURE — 40000170 ZZH STATISTIC PRE-PROCEDURE ASSESSMENT II: Performed by: INTERNAL MEDICINE

## 2018-03-29 PROCEDURE — C1877 STENT, NON-COAT/COV W/O DEL: HCPCS | Performed by: INTERNAL MEDICINE

## 2018-03-29 PROCEDURE — 71000014 ZZH RECOVERY PHASE 1 LEVEL 2 FIRST HR: Performed by: INTERNAL MEDICINE

## 2018-03-29 PROCEDURE — 36415 COLL VENOUS BLD VENIPUNCTURE: CPT | Performed by: INTERNAL MEDICINE

## 2018-03-29 DEVICE — STENT FREEMAN PANCREA FLEX 7FRX5CM SGL PIGTAIL: Type: IMPLANTABLE DEVICE | Site: PANCREAS | Status: FUNCTIONAL

## 2018-03-29 DEVICE — STENT GEENEN PANCREA 5FRX3CM G49663 GPSO-SF-5-3: Type: IMPLANTABLE DEVICE | Site: PANCREAS | Status: FUNCTIONAL

## 2018-03-29 RX ORDER — IOPAMIDOL 510 MG/ML
INJECTION, SOLUTION INTRAVASCULAR PRN
Status: DISCONTINUED | OUTPATIENT
Start: 2018-03-29 | End: 2018-03-29 | Stop reason: HOSPADM

## 2018-03-29 RX ORDER — FENTANYL CITRATE 50 UG/ML
25-50 INJECTION, SOLUTION INTRAMUSCULAR; INTRAVENOUS EVERY 5 MIN PRN
Status: DISCONTINUED | OUTPATIENT
Start: 2018-03-29 | End: 2018-03-29 | Stop reason: HOSPADM

## 2018-03-29 RX ORDER — FENTANYL CITRATE 50 UG/ML
INJECTION, SOLUTION INTRAMUSCULAR; INTRAVENOUS PRN
Status: DISCONTINUED | OUTPATIENT
Start: 2018-03-29 | End: 2018-03-29

## 2018-03-29 RX ORDER — DEXAMETHASONE SODIUM PHOSPHATE 4 MG/ML
INJECTION, SOLUTION INTRA-ARTICULAR; INTRALESIONAL; INTRAMUSCULAR; INTRAVENOUS; SOFT TISSUE PRN
Status: DISCONTINUED | OUTPATIENT
Start: 2018-03-29 | End: 2018-03-29

## 2018-03-29 RX ORDER — LIDOCAINE 40 MG/G
CREAM TOPICAL
Status: DISCONTINUED | OUTPATIENT
Start: 2018-03-29 | End: 2018-03-29 | Stop reason: HOSPADM

## 2018-03-29 RX ORDER — ONDANSETRON 2 MG/ML
INJECTION INTRAMUSCULAR; INTRAVENOUS PRN
Status: DISCONTINUED | OUTPATIENT
Start: 2018-03-29 | End: 2018-03-29

## 2018-03-29 RX ORDER — NALOXONE HYDROCHLORIDE 0.4 MG/ML
.1-.4 INJECTION, SOLUTION INTRAMUSCULAR; INTRAVENOUS; SUBCUTANEOUS
Status: DISCONTINUED | OUTPATIENT
Start: 2018-03-29 | End: 2018-03-29 | Stop reason: HOSPADM

## 2018-03-29 RX ORDER — MEPERIDINE HYDROCHLORIDE 50 MG/ML
12.5 INJECTION INTRAMUSCULAR; INTRAVENOUS; SUBCUTANEOUS EVERY 5 MIN PRN
Status: DISCONTINUED | OUTPATIENT
Start: 2018-03-29 | End: 2018-03-29 | Stop reason: HOSPADM

## 2018-03-29 RX ORDER — INDOMETHACIN 50 MG/1
100 SUPPOSITORY RECTAL
Status: DISCONTINUED | OUTPATIENT
Start: 2018-03-29 | End: 2018-03-29 | Stop reason: HOSPADM

## 2018-03-29 RX ORDER — FLUMAZENIL 0.1 MG/ML
0.2 INJECTION, SOLUTION INTRAVENOUS
Status: DISCONTINUED | OUTPATIENT
Start: 2018-03-29 | End: 2018-03-29 | Stop reason: HOSPADM

## 2018-03-29 RX ORDER — ONDANSETRON 2 MG/ML
4 INJECTION INTRAMUSCULAR; INTRAVENOUS EVERY 30 MIN PRN
Status: DISCONTINUED | OUTPATIENT
Start: 2018-03-29 | End: 2018-03-29 | Stop reason: HOSPADM

## 2018-03-29 RX ORDER — PROPOFOL 10 MG/ML
INJECTION, EMULSION INTRAVENOUS PRN
Status: DISCONTINUED | OUTPATIENT
Start: 2018-03-29 | End: 2018-03-29

## 2018-03-29 RX ORDER — ONDANSETRON 4 MG/1
4 TABLET, ORALLY DISINTEGRATING ORAL EVERY 30 MIN PRN
Status: DISCONTINUED | OUTPATIENT
Start: 2018-03-29 | End: 2018-03-29 | Stop reason: HOSPADM

## 2018-03-29 RX ORDER — LEVOFLOXACIN 5 MG/ML
INJECTION, SOLUTION INTRAVENOUS PRN
Status: DISCONTINUED | OUTPATIENT
Start: 2018-03-29 | End: 2018-03-29

## 2018-03-29 RX ORDER — SODIUM CHLORIDE, SODIUM LACTATE, POTASSIUM CHLORIDE, CALCIUM CHLORIDE 600; 310; 30; 20 MG/100ML; MG/100ML; MG/100ML; MG/100ML
INJECTION, SOLUTION INTRAVENOUS CONTINUOUS
Status: DISCONTINUED | OUTPATIENT
Start: 2018-03-29 | End: 2018-03-29 | Stop reason: HOSPADM

## 2018-03-29 RX ADMIN — GLUCAGON HYDROCHLORIDE 0.4 MG: KIT at 15:19

## 2018-03-29 RX ADMIN — SUGAMMADEX 200 MG: 100 INJECTION, SOLUTION INTRAVENOUS at 15:50

## 2018-03-29 RX ADMIN — ONDANSETRON 4 MG: 2 INJECTION INTRAMUSCULAR; INTRAVENOUS at 15:42

## 2018-03-29 RX ADMIN — Medication 100 MG: at 13:59

## 2018-03-29 RX ADMIN — ROCURONIUM BROMIDE 50 MG: 10 INJECTION INTRAVENOUS at 14:05

## 2018-03-29 RX ADMIN — FENTANYL CITRATE 50 MCG: 50 INJECTION, SOLUTION INTRAMUSCULAR; INTRAVENOUS at 15:04

## 2018-03-29 RX ADMIN — LEVOFLOXACIN 500 MG: 5 INJECTION, SOLUTION INTRAVENOUS at 14:25

## 2018-03-29 RX ADMIN — IOPAMIDOL 10 ML: 510 INJECTION, SOLUTION INTRAVASCULAR at 15:56

## 2018-03-29 RX ADMIN — FENTANYL CITRATE 150 MCG: 50 INJECTION, SOLUTION INTRAMUSCULAR; INTRAVENOUS at 13:59

## 2018-03-29 RX ADMIN — PROPOFOL 120 MG: 10 INJECTION, EMULSION INTRAVENOUS at 13:59

## 2018-03-29 RX ADMIN — MIDAZOLAM 1 MG: 1 INJECTION INTRAMUSCULAR; INTRAVENOUS at 13:44

## 2018-03-29 RX ADMIN — DEXAMETHASONE SODIUM PHOSPHATE 6 MG: 4 INJECTION, SOLUTION INTRA-ARTICULAR; INTRALESIONAL; INTRAMUSCULAR; INTRAVENOUS; SOFT TISSUE at 14:37

## 2018-03-29 RX ADMIN — SODIUM CHLORIDE, POTASSIUM CHLORIDE, SODIUM LACTATE AND CALCIUM CHLORIDE: 600; 310; 30; 20 INJECTION, SOLUTION INTRAVENOUS at 13:23

## 2018-03-29 NOTE — PROGRESS NOTES
Labs was contacted for a STAT INR lab and agreed to do so. Pt was taken to OR without lab being done. Lab just should up to do lab now. RN contacted OR RN and they are aware that lab still needs to be drawn.

## 2018-03-29 NOTE — ANESTHESIA CARE TRANSFER NOTE
Patient: Antoine Russo    Procedure(s):  Endoscopic Retrograde Cholangiopancreatogram, Endoscopic Ultrasound, Biliary Sphincterotomy, Biliary and Pancreatic Stent Placement - Wound Class: II-Clean Contaminated   - Wound Class: II-Clean Contaminated    Diagnosis: Recurrent Pancreatitis   Diagnosis Additional Information: No value filed.    Anesthesia Type:   General, ETT     Note:  Airway :Nasal Cannula  Patient transferred to:PACU  Comments: Patient transferred to PACU spontaneously breathing.  VSS.  Report to RN. Handoff Report: Identifed the Patient, Identified the Reponsible Provider, Reviewed the pertinent medical history, Discussed the surgical course, Reviewed Intra-OP anesthesia mangement and issues during anesthesia, Set expectations for post-procedure period and Allowed opportunity for questions and acknowledgement of understanding      Vitals: (Last set prior to Anesthesia Care Transfer)    CRNA VITALS  3/29/2018 1526 - 3/29/2018 1603      3/29/2018             Pulse: 93    Ht Rate: 96    SpO2: 98 %    Resp Rate (observed): 9                Electronically Signed By: FERNANDO Sneed CRNA  March 29, 2018  4:03 PM

## 2018-03-29 NOTE — ANESTHESIA POSTPROCEDURE EVALUATION
Patient: Antoine Russo    Procedure(s):  Endoscopic Retrograde Cholangiopancreatogram, Endoscopic Ultrasound, Biliary Sphincterotomy, Biliary and Pancreatic Stent Placement - Wound Class: II-Clean Contaminated   - Wound Class: II-Clean Contaminated    Diagnosis:Recurrent Pancreatitis   Diagnosis Additional Information: No value filed.    Anesthesia Type:  General, ETT    Note:  Anesthesia Post Evaluation    Patient location during evaluation: PACU  Patient participation: Able to fully participate in evaluation  Level of consciousness: awake and alert  Pain management: adequate  Airway patency: patent  Cardiovascular status: acceptable  Respiratory status: acceptable  Hydration status: acceptable  PONV: none     Anesthetic complications: None          Last vitals:  Vitals:    03/29/18 1615 03/29/18 1630 03/29/18 1645   BP: 150/78 143/75 138/66   Resp: 12 12 16   Temp:  36.4  C (97.6  F)    SpO2: 94% 93% 99%         Electronically Signed By: Avtar Garcia MD  March 29, 2018  4:52 PM

## 2018-03-29 NOTE — BRIEF OP NOTE
[facility] [place]  Gastroenterology Brief Operative Note    Pre-operative diagnosis: Pancreatic tail leak   Post-operative diagnosis Same   Procedure: EUS, ERCP   Surgeon: Nader Plata MD; Yovanny Beasley MD   Assistants(s): Dimitrios Hunt MD   Anesthesia: General endotracheal anesthesia   Estimated blood loss: Minimal    Total IV fluids: (See anesthesia record)   Blood transfusion: No transfusion was given during surgery   Total urine output: (See anesthesia record)   Drains: None   Specimens: None   Implants: 5 Fr x 3 cm Sofflex stent in PD; 7 Fr x 5 cm SPT Mckeon   Findings: EUS: Staples at the line of distal pancreatectomy seen with small area of amorphous fluid just up stream. Collection too small and in the area of staples to sample as well as risking infection. Clinically, most consistent with a recurrent tail leak.  ERCP: Short PD stent placed to decompress pancreatic duct. Normal cholangiogram. 10 mm biliary sphincterotomy performed. Temporary biliary stent placed to facilitate drainage.   Complications: None   Condition: Stable   Comments:      Recommendations:         See dictated procedure report for full details (found in chart review under 'Procedures')    - Observe in Same Day for possible discharge  - Discharge home if minimal or no pain  - Hold warfarin and other anticoagulation for 3 more days and can then restart  - Abdominal X-ray in 3 weeks and if stents still present will perform EGD in J-unit to remove     Dimitrios Hunt MD  [phone]

## 2018-03-29 NOTE — OR NURSING
Discharge instructions to pt and responsible adult.  All questions regarding discharge information answered.  Pt and responsible adult verbalized understanding of all discharge instruction information given. No new prescriptions. Dr. Beasley at bedside to speak to pt prior to discharge.

## 2018-03-29 NOTE — IP AVS SNAPSHOT
Same Day Surgery [address] [address]    Phone:  [phone]                                       After Visit Summary   3/29/2018    Antoine Russo    MRN: [id number]           After Visit Summary Signature Page     I have received my discharge instructions, and my questions have been answered. I have discussed any challenges I see with this plan with the nurse or doctor.    ..........................................................................................................................................  Patient/Patient Representative Signature      ..........................................................................................................................................  Patient Representative Print Name and Relationship to Patient    ..................................................               ................................................  Date                                            Time    ..........................................................................................................................................  Reviewed by Signature/Title    ...................................................              ..............................................  Date                                                            Time

## 2018-03-29 NOTE — OR NURSING
Dr. Garcia aware of pt status unchanged from when he was her to assess pt earlier. MD states ok to transfer to phase 2 and will place sign out when able.

## 2018-03-29 NOTE — DISCHARGE INSTRUCTIONS
REMEMBER: SAME DAY SURGERY IS NOT SAME DAY RECOVERY. TAKE IT EASY, GET PLENTY OF REST, AND HAVE SOMEONE WITH YOU FOR 24 HOURS. FOLLOW THESE INSTRUCTIONS AND PLEASE DO NOT HESITATE TO CALL WITH ANY QUESTIONS.   [facility] [place]  Same-Day Surgery   Adult Discharge Orders & Instructions     For 24 hours after surgery    1. Get plenty of rest.  A responsible adult must stay with you for at least 24 hours after you leave the hospital.   2. Do not drive or use heavy equipment.  If you have weakness or tingling, don't drive or use heavy equipment until this feeling goes away.  3. Do not drink alcohol.  4. Avoid strenuous or risky activities.  Ask for help when climbing stairs.   5. You may feel lightheaded.  IF so, sit for a few minutes before standing.  Have someone help you get up.   6. If you have nausea (feel sick to your stomach): Drink only clear liquids such as apple juice, ginger ale, broth or 7-Up.  Rest may also help.  Be sure to drink enough fluids.  Move to a regular diet as you feel able.  7. You may have a slight fever. Call the doctor if your fever is over 100 F (37.7 C) (taken under the tongue) or lasts longer than 24 hours.  8. You may have a dry mouth, a sore throat, muscle aches or trouble sleeping.  These should go away after 24 hours.  9. Do not make important or legal decisions.   Call your doctor for any of the followin.  Signs of infection (fever, growing tenderness at the surgery site, a large amount of drainage or bleeding, severe pain, foul-smelling drainage, redness, swelling).    2. It has been over 8 to 10 hours since surgery and you are still not able to urinate (pass water).    To contact a doctor, call Dr Beasley's office at [phone] or:        [phone] and ask for the resident on call for GI (answered 24 hours a day)      Emergency Department:  [facility]: [phone]       (TTY for hearing impaired: [phone])

## 2018-03-29 NOTE — IP AVS SNAPSHOT
MRN:[id number]                      After Visit Summary   3/29/2018    Antoine Russo    MRN: [id number]           Thank you!     Thank you for choosing [facility] for your care. Our goal is always to provide you with excellent care. Hearing back from our patients is one way we can continue to improve our services. Please take a few minutes to complete the written survey that you may receive in the mail after you visit with us. Thank you!        Patient Information     Date Of Birth          1945        About your hospital stay     You were admitted on:  March 29, 2018 You last received care in the:  Same Day Surgery [facility]    You were discharged on:  March 29, 2018       Who to Call     For medical emergencies, please call 911.  For non-urgent questions about your medical care, please call your primary care provider or clinic, [phone]  For questions related to your surgery, please call your surgery clinic        Attending Provider     Provider Specialty    Yovanny Beasley MD Gastroenterology       Primary Care Provider Office Phone # Fax #    Katie Gross [phone]      After Care Instructions     Discharge Instructions       Resume pre procedure diet            Discharge Instructions       Restart home medications.                  Your next 10 appointments already scheduled     Mar 30, 2018  8:00 AM CDT   [facility] Visit with Katie Gross MD   [facility]    [address] [place] [id number] [phone]           Bring a current list of meds and any records pertaining to this visit. For Physicals, please bring immunization records and any forms needing to be filled out. Please arrive 10 minutes early to complete paperwork.            Apr 05, 2018 10:30 AM CDT   Anticoagulation Visit with NB ANTI COAG   [facility]    [address] [place] [address]  [place] [address]   [phone]              Further instructions from your care team       REMEMBER: SAME DAY SURGERY IS NOT SAME DAY RECOVERY. TAKE IT EASY, GET PLENTY OF REST, AND HAVE SOMEONE WITH YOU FOR 24 HOURS. FOLLOW THESE INSTRUCTIONS AND PLEASE DO NOT HESITATE TO CALL WITH ANY QUESTIONS.   [facility] [place]  Same-Day Surgery   Adult Discharge Orders & Instructions     For 24 hours after surgery    1. Get plenty of rest.  A responsible adult must stay with you for at least 24 hours after you leave the hospital.   2. Do not drive or use heavy equipment.  If you have weakness or tingling, don't drive or use heavy equipment until this feeling goes away.  3. Do not drink alcohol.  4. Avoid strenuous or risky activities.  Ask for help when climbing stairs.   5. You may feel lightheaded.  IF so, sit for a few minutes before standing.  Have someone help you get up.   6. If you have nausea (feel sick to your stomach): Drink only clear liquids such as apple juice, ginger ale, broth or 7-Up.  Rest may also help.  Be sure to drink enough fluids.  Move to a regular diet as you feel able.  7. You may have a slight fever. Call the doctor if your fever is over 100 F (37.7 C) (taken under the tongue) or lasts longer than 24 hours.  8. You may have a dry mouth, a sore throat, muscle aches or trouble sleeping.  These should go away after 24 hours.  9. Do not make important or legal decisions.   Call your doctor for any of the followin.  Signs of infection (fever, growing tenderness at the surgery site, a large amount of drainage or bleeding, severe pain, foul-smelling drainage, redness, swelling).    2. It has been over 8 to 10 hours since surgery and you are still not able to urinate (pass water).    To contact a doctor, call Dr Beasley's office at [phone] or:        [phone] and ask for the resident on call for GI (answered 24 hours a day)      Emergency  "Department:  [facility]: [phone]       (TTY for hearing impaired: [phone])            Pending Results     No orders found from 3/27/2018 to 3/30/2018.            Admission Information     Date & Time Provider Department Dept. Phone    3/29/2018 Yovanny Beasley MD Same Day Surgery [facility] [phone]      Your Vitals Were     Blood Pressure Temperature Respirations Height Weight Pulse Oximetry    124/74 97.9  F (36.6  C) (Oral) 14 1.778 m (5' 10\") 91.1 kg (200 lb 13.4 oz) 93%    BMI (Body Mass Index)                   28.82 kg/m2           [facility] Information     [facility] gives you secure access to your electronic health record. If you see a primary care provider, you can also send messages to your care team and make appointments. If you have questions, please call your primary care clinic.  If you do not have a primary care provider, please call [phone] and they will assist you.        Care EveryWhere ID     This is your Care EveryWhere ID. This could be used by other organizations to access your [facility] medical records  [id number]        Equal Access to Services     ABBEY WALTON : Hadii stoney Bernstein, wazoraidada mckayla, qaybta kaalmada byron, koffi fay. So [facility] [phone].    ATENCIÓN: Si habla español, tiene a snyder disposición servicios gratuitos de asistencia lingüística. Llame al [phone].    We comply with applicable federal civil rights laws and [place] laws. We do not discriminate on the basis of race, color, national origin, age, disability, sex, sexual orientation, or gender identity.               Review of your medicines      CONTINUE these medicines which may have CHANGED, or have new prescriptions. If we are uncertain of the size of tablets/capsules you have at home, strength may be listed as something that might have changed.        Dose / Directions    multivitamin, therapeutic with minerals Tabs tablet   This may " have changed:  when to take this   Used for:  Surgery aftercare        Dose:  1 tablet   Take 1 tablet by mouth daily.   Quantity:  30 each   Refills:  1         CONTINUE these medicines which have NOT CHANGED        Dose / Directions    gabapentin 300 MG capsule   Commonly known as:  NEURONTIN   Used for:  Type 2 diabetes mellitus with diabetic polyneuropathy, without long-term current use of insulin (H)        Take 2 tablest (600 mg) every night   Quantity:  180 capsule   Refills:  1       glipiZIDE 2.5 MG 24 hr tablet   Commonly known as:  glipiZIDE XL   Used for:  Type 2 diabetes mellitus with diabetic polyneuropathy, without long-term current use of insulin (H)        Dose:  2.5 mg   Take 1 tablet (2.5 mg) by mouth every morning   Quantity:  90 tablet   Refills:  3       lisinopril 10 MG tablet   Commonly known as:  PRINIVIL/ZESTRIL   Used for:  Persistent proteinuria        Dose:  10 mg   Take 1 tablet (10 mg) by mouth daily   Quantity:  90 tablet   Refills:  1       metFORMIN 500 MG 24 hr tablet   Commonly known as:  GLUCOPHAGE-XR   Used for:  Type 2 diabetes mellitus with diabetic polyneuropathy, without long-term current use of insulin (H)        Dose:  1000 mg   Take 2 tablets (1,000 mg) by mouth 2 times daily (with meals)   Quantity:  360 tablet   Refills:  1       metoprolol succinate 50 MG 24 hr tablet   Commonly known as:  TOPROL-XL   Used for:  Hypertension, benign essential, goal below 140/90        Dose:  50 mg   Take 1 tablet (50 mg) by mouth 2 times daily   Quantity:  180 tablet   Refills:  3       tamsulosin 0.4 MG capsule   Commonly known as:  FLOMAX   Used for:  Urgency of urination, Hypertrophy of prostate without urinary obstruction        Dose:  0.4 mg   Take 1 capsule (0.4 mg) by mouth daily   Quantity:  90 capsule   Refills:  3       warfarin 2.5 MG tablet   Commonly known as:  COUMADIN   Used for:  Chronic atrial fibrillation (H)        1.25 mg Tues/Sat and 2.5 mg all other days or As  directed by Anticoagulation Clinic   Quantity:  90 tablet   Refills:  0                Protect others around you: Learn how to safely use, store and throw away your medicines at www.disposemymeds.org.             Medication List: This is a list of all your medications and when to take them. Check marks below indicate your daily home schedule. Keep this list as a reference.      Medications           Morning Afternoon Evening Bedtime As Needed    gabapentin 300 MG capsule   Commonly known as:  NEURONTIN   Take 2 tablest (600 mg) every night                                glipiZIDE 2.5 MG 24 hr tablet   Commonly known as:  glipiZIDE XL   Take 1 tablet (2.5 mg) by mouth every morning                                lisinopril 10 MG tablet   Commonly known as:  PRINIVIL/ZESTRIL   Take 1 tablet (10 mg) by mouth daily                                metFORMIN 500 MG 24 hr tablet   Commonly known as:  GLUCOPHAGE-XR   Take 2 tablets (1,000 mg) by mouth 2 times daily (with meals)                                metoprolol succinate 50 MG 24 hr tablet   Commonly known as:  TOPROL-XL   Take 1 tablet (50 mg) by mouth 2 times daily                                multivitamin, therapeutic with minerals Tabs tablet   Take 1 tablet by mouth daily.                                tamsulosin 0.4 MG capsule   Commonly known as:  FLOMAX   Take 1 capsule (0.4 mg) by mouth daily                                warfarin 2.5 MG tablet   Commonly known as:  COUMADIN   1.25 mg Tues/Sat and 2.5 mg all other days or As directed by Anticoagulation Clinic

## 2018-03-29 NOTE — OR NURSING
Pre op blood glucose was 192 at 0759. Writer spoke with Dr. Pope (anesthesia) and no intervention ordered at this time.

## 2018-03-30 ENCOUNTER — APPOINTMENT (OUTPATIENT)
Dept: CT IMAGING | Facility: CLINIC | Age: 73
End: 2018-03-30
Attending: EMERGENCY MEDICINE
Payer: COMMERCIAL

## 2018-03-30 ENCOUNTER — CARE COORDINATION (OUTPATIENT)
Dept: GASTROENTEROLOGY | Facility: CLINIC | Age: 73
End: 2018-03-30

## 2018-03-30 ENCOUNTER — HOSPITAL ENCOUNTER (INPATIENT)
Facility: CLINIC | Age: 73
LOS: 4 days | Discharge: HOME | End: 2018-04-04
Attending: EMERGENCY MEDICINE | Admitting: INTERNAL MEDICINE
Payer: COMMERCIAL

## 2018-03-30 ENCOUNTER — OFFICE VISIT (OUTPATIENT)
Dept: FAMILY MEDICINE | Facility: CLINIC | Age: 73
End: 2018-03-30
Payer: COMMERCIAL

## 2018-03-30 VITALS
DIASTOLIC BLOOD PRESSURE: 79 MMHG | BODY MASS INDEX: 28.7 KG/M2 | SYSTOLIC BLOOD PRESSURE: 139 MMHG | HEART RATE: 70 BPM | RESPIRATION RATE: 16 BRPM | TEMPERATURE: 97.8 F | WEIGHT: 200 LBS | OXYGEN SATURATION: 94 %

## 2018-03-30 DIAGNOSIS — A41.9 BACTERIAL SEPSIS (H): ICD-10-CM

## 2018-03-30 DIAGNOSIS — I48.20 CHRONIC ATRIAL FIBRILLATION (H): Chronic | ICD-10-CM

## 2018-03-30 DIAGNOSIS — I48.20 CHRONIC ATRIAL FIBRILLATION (H): Primary | Chronic | ICD-10-CM

## 2018-03-30 DIAGNOSIS — Z46.89 ENCOUNTER FOR REMOVAL OF PANCREATIC STENT: Primary | ICD-10-CM

## 2018-03-30 DIAGNOSIS — E11.42 TYPE 2 DIABETES MELLITUS WITH DIABETIC POLYNEUROPATHY, WITHOUT LONG-TERM CURRENT USE OF INSULIN (H): Chronic | ICD-10-CM

## 2018-03-30 DIAGNOSIS — I10 HYPERTENSION, BENIGN ESSENTIAL, GOAL BELOW 140/90: Chronic | ICD-10-CM

## 2018-03-30 DIAGNOSIS — D49.0 IPMN (INTRADUCTAL PAPILLARY MUCINOUS NEOPLASM): Chronic | ICD-10-CM

## 2018-03-30 DIAGNOSIS — E83.39 HYPOPHOSPHATEMIA: ICD-10-CM

## 2018-03-30 LAB
ALBUMIN SERPL-MCNC: 3.4 G/DL (ref 3.4–5)
ALP SERPL-CCNC: 48 U/L (ref 40–150)
ALT SERPL W P-5'-P-CCNC: 22 U/L (ref 0–70)
ANION GAP SERPL CALCULATED.3IONS-SCNC: 11 MMOL/L (ref 3–14)
AST SERPL W P-5'-P-CCNC: 21 U/L (ref 0–45)
BASOPHILS # BLD AUTO: 0 10E9/L (ref 0–0.2)
BASOPHILS NFR BLD AUTO: 0.1 %
BILIRUB SERPL-MCNC: 0.8 MG/DL (ref 0.2–1.3)
BUN SERPL-MCNC: 25 MG/DL (ref 7–30)
CALCIUM SERPL-MCNC: 9.8 MG/DL (ref 8.5–10.1)
CHLORIDE SERPL-SCNC: 102 MMOL/L (ref 94–109)
CO2 SERPL-SCNC: 27 MMOL/L (ref 20–32)
CREAT SERPL-MCNC: 1.29 MG/DL (ref 0.66–1.25)
DIFFERENTIAL METHOD BLD: ABNORMAL
EOSINOPHIL # BLD AUTO: 0 10E9/L (ref 0–0.7)
EOSINOPHIL NFR BLD AUTO: 0 %
ERCP: NORMAL
ERYTHROCYTE [DISTWIDTH] IN BLOOD BY AUTOMATED COUNT: 15.1 % (ref 10–15)
GFR SERPL CREATININE-BSD FRML MDRD: 55 ML/MIN/1.7M2
GLUCOSE SERPL-MCNC: 261 MG/DL (ref 70–99)
HCT VFR BLD AUTO: 48.4 % (ref 40–53)
HGB BLD-MCNC: 15.8 G/DL (ref 13.3–17.7)
IMM GRANULOCYTES # BLD: 0.2 10E9/L (ref 0–0.4)
IMM GRANULOCYTES NFR BLD: 0.5 %
LACTATE BLD-SCNC: 4.7 MMOL/L (ref 0.7–2)
LACTATE BLD-SCNC: 4.8 MMOL/L (ref 0.7–2)
LIPASE SERPL-CCNC: 9697 U/L (ref 73–393)
LYMPHOCYTES # BLD AUTO: 1.2 10E9/L (ref 0.8–5.3)
LYMPHOCYTES NFR BLD AUTO: 3.9 %
MCH RBC QN AUTO: 32.8 PG (ref 26.5–33)
MCHC RBC AUTO-ENTMCNC: 32.6 G/DL (ref 31.5–36.5)
MCV RBC AUTO: 100 FL (ref 78–100)
MONOCYTES # BLD AUTO: 1.3 10E9/L (ref 0–1.3)
MONOCYTES NFR BLD AUTO: 4.6 %
NEUTROPHILS # BLD AUTO: 26.5 10E9/L (ref 1.6–8.3)
NEUTROPHILS NFR BLD AUTO: 90.9 %
NRBC # BLD AUTO: 0 10*3/UL
NRBC BLD AUTO-RTO: 0 /100
PLATELET # BLD AUTO: 249 10E9/L (ref 150–450)
POTASSIUM SERPL-SCNC: 4.6 MMOL/L (ref 3.4–5.3)
PROT SERPL-MCNC: 7.9 G/DL (ref 6.8–8.8)
RBC # BLD AUTO: 4.82 10E12/L (ref 4.4–5.9)
SODIUM SERPL-SCNC: 140 MMOL/L (ref 133–144)
TROPONIN I SERPL-MCNC: <0.015 UG/L (ref 0–0.04)
WBC # BLD AUTO: 29.2 10E9/L (ref 4–11)

## 2018-03-30 PROCEDURE — 99285 EMERGENCY DEPT VISIT HI MDM: CPT | Mod: 25 | Performed by: EMERGENCY MEDICINE

## 2018-03-30 PROCEDURE — 83690 ASSAY OF LIPASE: CPT | Performed by: EMERGENCY MEDICINE

## 2018-03-30 PROCEDURE — 99285 EMERGENCY DEPT VISIT HI MDM: CPT | Mod: Z6 | Performed by: EMERGENCY MEDICINE

## 2018-03-30 PROCEDURE — 99214 OFFICE O/P EST MOD 30 MIN: CPT | Performed by: FAMILY MEDICINE

## 2018-03-30 PROCEDURE — 80053 COMPREHEN METABOLIC PANEL: CPT | Performed by: EMERGENCY MEDICINE

## 2018-03-30 PROCEDURE — 74177 CT ABD & PELVIS W/CONTRAST: CPT

## 2018-03-30 PROCEDURE — 25000128 H RX IP 250 OP 636: Performed by: EMERGENCY MEDICINE

## 2018-03-30 PROCEDURE — 84484 ASSAY OF TROPONIN QUANT: CPT | Performed by: EMERGENCY MEDICINE

## 2018-03-30 PROCEDURE — 87040 BLOOD CULTURE FOR BACTERIA: CPT | Performed by: EMERGENCY MEDICINE

## 2018-03-30 PROCEDURE — 85025 COMPLETE CBC W/AUTO DIFF WBC: CPT | Performed by: EMERGENCY MEDICINE

## 2018-03-30 PROCEDURE — 83605 ASSAY OF LACTIC ACID: CPT | Performed by: EMERGENCY MEDICINE

## 2018-03-30 PROCEDURE — 25000128 H RX IP 250 OP 636: Performed by: STUDENT IN AN ORGANIZED HEALTH CARE EDUCATION/TRAINING PROGRAM

## 2018-03-30 PROCEDURE — 96376 TX/PRO/DX INJ SAME DRUG ADON: CPT | Performed by: EMERGENCY MEDICINE

## 2018-03-30 PROCEDURE — 25000125 ZZHC RX 250: Performed by: STUDENT IN AN ORGANIZED HEALTH CARE EDUCATION/TRAINING PROGRAM

## 2018-03-30 PROCEDURE — 96361 HYDRATE IV INFUSION ADD-ON: CPT | Performed by: EMERGENCY MEDICINE

## 2018-03-30 PROCEDURE — 96375 TX/PRO/DX INJ NEW DRUG ADDON: CPT | Mod: 59 | Performed by: EMERGENCY MEDICINE

## 2018-03-30 RX ORDER — HYDROMORPHONE HYDROCHLORIDE 1 MG/ML
0.5 INJECTION, SOLUTION INTRAMUSCULAR; INTRAVENOUS; SUBCUTANEOUS
Status: DISCONTINUED | OUTPATIENT
Start: 2018-03-30 | End: 2018-03-31 | Stop reason: DRUGHIGH

## 2018-03-30 RX ORDER — HYDROMORPHONE HYDROCHLORIDE 1 MG/ML
0.5 INJECTION, SOLUTION INTRAMUSCULAR; INTRAVENOUS; SUBCUTANEOUS
Status: COMPLETED | OUTPATIENT
Start: 2018-03-30 | End: 2018-03-30

## 2018-03-30 RX ORDER — SODIUM CHLORIDE 9 MG/ML
INJECTION, SOLUTION INTRAVENOUS CONTINUOUS
Status: DISCONTINUED | OUTPATIENT
Start: 2018-03-30 | End: 2018-03-31 | Stop reason: CLARIF

## 2018-03-30 RX ORDER — ONDANSETRON 2 MG/ML
4 INJECTION INTRAMUSCULAR; INTRAVENOUS ONCE
Status: COMPLETED | OUTPATIENT
Start: 2018-03-30 | End: 2018-03-30

## 2018-03-30 RX ORDER — PIPERACILLIN SODIUM, TAZOBACTAM SODIUM 3; .375 G/15ML; G/15ML
3.38 INJECTION, POWDER, LYOPHILIZED, FOR SOLUTION INTRAVENOUS ONCE
Status: COMPLETED | OUTPATIENT
Start: 2018-03-30 | End: 2018-03-31

## 2018-03-30 RX ORDER — SODIUM CHLORIDE, SODIUM LACTATE, POTASSIUM CHLORIDE, CALCIUM CHLORIDE 600; 310; 30; 20 MG/100ML; MG/100ML; MG/100ML; MG/100ML
1000 INJECTION, SOLUTION INTRAVENOUS CONTINUOUS
Status: DISCONTINUED | OUTPATIENT
Start: 2018-03-30 | End: 2018-03-31 | Stop reason: CLARIF

## 2018-03-30 RX ORDER — IOPAMIDOL 755 MG/ML
123 INJECTION, SOLUTION INTRAVASCULAR ONCE
Status: COMPLETED | OUTPATIENT
Start: 2018-03-30 | End: 2018-03-30

## 2018-03-30 RX ORDER — ONDANSETRON 2 MG/ML
8 INJECTION INTRAMUSCULAR; INTRAVENOUS ONCE
Status: COMPLETED | OUTPATIENT
Start: 2018-03-30 | End: 2018-03-30

## 2018-03-30 RX ADMIN — IOPAMIDOL 123 ML: 755 INJECTION, SOLUTION INTRAVENOUS at 21:52

## 2018-03-30 RX ADMIN — ONDANSETRON 4 MG: 2 INJECTION INTRAMUSCULAR; INTRAVENOUS at 23:10

## 2018-03-30 RX ADMIN — ONDANSETRON 8 MG: 2 INJECTION INTRAMUSCULAR; INTRAVENOUS at 20:08

## 2018-03-30 RX ADMIN — HYDROMORPHONE HYDROCHLORIDE 0.5 MG: 1 INJECTION, SOLUTION INTRAMUSCULAR; INTRAVENOUS; SUBCUTANEOUS at 20:08

## 2018-03-30 RX ADMIN — HYDROMORPHONE HYDROCHLORIDE 0.5 MG: 1 INJECTION, SOLUTION INTRAMUSCULAR; INTRAVENOUS; SUBCUTANEOUS at 23:42

## 2018-03-30 RX ADMIN — SODIUM CHLORIDE, POTASSIUM CHLORIDE, SODIUM LACTATE AND CALCIUM CHLORIDE 1000 ML: 600; 310; 30; 20 INJECTION, SOLUTION INTRAVENOUS at 20:08

## 2018-03-30 RX ADMIN — SODIUM CHLORIDE 1000 ML: 9 INJECTION, SOLUTION INTRAVENOUS at 23:10

## 2018-03-30 RX ADMIN — SODIUM CHLORIDE, PRESERVATIVE FREE 81 ML: 5 INJECTION INTRAVENOUS at 21:52

## 2018-03-30 RX ADMIN — SODIUM CHLORIDE, POTASSIUM CHLORIDE, SODIUM LACTATE AND CALCIUM CHLORIDE 1000 ML: 600; 310; 30; 20 INJECTION, SOLUTION INTRAVENOUS at 22:10

## 2018-03-30 RX ADMIN — HYDROMORPHONE HYDROCHLORIDE 0.5 MG: 1 INJECTION, SOLUTION INTRAMUSCULAR; INTRAVENOUS; SUBCUTANEOUS at 22:24

## 2018-03-30 RX ADMIN — HYDROMORPHONE HYDROCHLORIDE 0.5 MG: 1 INJECTION, SOLUTION INTRAMUSCULAR; INTRAVENOUS; SUBCUTANEOUS at 20:28

## 2018-03-30 ASSESSMENT — ENCOUNTER SYMPTOMS
FEVER: 0
NAUSEA: 1
ABDOMINAL PAIN: 1
SHORTNESS OF BREATH: 0
VOMITING: 1

## 2018-03-30 NOTE — IP AVS SNAPSHOT
[address] [address]    Phone:  [phone]                                       After Visit Summary   3/30/2018    Antoine Russo    MRN: [id number]           After Visit Summary Signature Page     I have received my discharge instructions, and my questions have been answered. I have discussed any challenges I see with this plan with the nurse or doctor.    ..........................................................................................................................................  Patient/Patient Representative Signature      ..........................................................................................................................................  Patient Representative Print Name and Relationship to Patient    ..................................................               ................................................  Date                                            Time    ..........................................................................................................................................  Reviewed by Signature/Title    ...................................................              ..............................................  Date                                                            Time

## 2018-03-30 NOTE — PROGRESS NOTES
"Post ERCP (3/29/2018) with Dr. Beasley: Follow-up    Post procedure recommendations: As long as patient doing well without signs of further duct leak, schedule for abdominal X-ray in 3 weeks to confirm stents still present followed by EGD in J-unit to remove.     Patient states he is terrible, he ate some mini tacos this morning and has thrown x 4 up since and then 2 more dry heaves. The nausea is gone but he is having a lot of pain. Pain is rated at 9-10/10 right now. He has not taken anything for pain yet. Advised him to try some tylenol and start drinking water to stay hydrated. He can also take some antacids if the pain feels like it is burning. He can also try pedialyte to stay hydrated. He will call this RN on Monday to check in. He is advised of the following concerning symptoms   ~ Advised to go to the ER if develops:    Fevers over 101 +/- chills,    Significant nausea/vomiting causing inability to take in fluids depleting hydration.    severe pain, ongoing symptoms (pain, nausea) that cannot be controlled with prescribed or OTC medication.    Signs of dehydration (pale skin, low blood pressure, lightheadedness, dark urine, \"sticky\" skin when pinched, rapid heart rate, little to no urine output).  Verbalized understanding. He will call to schedule his x-ray on Monday- it is due April 19th. He will get it done locally at [facility].     Orders placed: x-ray due April 19th     Contact information verified for future questions/concerns.    Marianne BUSH RN Coordinator  Dr. Beasley, Dr. Parish & Dr. Rawls  [facility]  [phone]          "

## 2018-03-30 NOTE — NURSING NOTE
"Chief Complaint   Patient presents with     Heart Problem     recheck after being off medicine       Initial /79 (BP Location: Left arm)  Pulse 70  Temp 97.8  F (36.6  C) (Tympanic)  Resp 16  Wt 200 lb (90.7 kg)  SpO2 94%  BMI 28.7 kg/m2 Estimated body mass index is 28.7 kg/(m^2) as calculated from the following:    Height as of 3/29/18: 5' 10\" (1.778 m).    Weight as of this encounter: 200 lb (90.7 kg).      Health Maintenance that is potentially due pending provider review:  NONE    n/a    Is there anyone who you would like to be able to receive your results? No  If yes have patient fill out VIDYA    "

## 2018-03-30 NOTE — PROGRESS NOTES
SUBJECTIVE:   Antoine Russo is a 72 year old male who presents to clinic today for the following health issues:      Follow up surgery yesterday - wants to discuss A- fib medication  Results for orders placed or performed during the hospital encounter of 03/29/18   ENDOSCOPIC RETROGRADE CHOLANGIOPANCREATOGRAPHY   Result Value Ref Range    ERCP       [facility], [address] [place] [address] [phone]     Endoscopy Department  _______________________________________________________________________________  Patient Name: Antoine Russo        Procedure Date: 3/29/2018 1:50 PM  MRN: [id number]                       Account Number: [id number]  YOB: 1945             Admit Type: Outpatient  Age: 72                                Gender: Male  Note Status: Finalized                Attending MD: Yovanny Beasley MD  Pause for the Cause: Completed with Time Out Total Sedation Time:   _______________________________________________________________________________     Procedure:           ERCP  Indications:         For therapy of pancreatic leak; 72 year old male with                        afib on warfarin s/p distal pancreatectomy for IPMN of                        tail in the setting of chronic pancreatitis. Margins                        negative by path. Had ongoing PD leak, treated with  PD                        stent on 5/2016. Did well until last 4 months, 3                        admisssions for acute pancreatitis by enzymes and CT. CT                        on 2/25/18 shows slowly enlarging small pseudcysts off                        tail, with read as calcifications but might be clips. No                        interval sx. Was drinking ETOH 4-5 per day until                        January. EUS performed today shows small fluid                        collection at the resection margin but not sampled due                        to the risk of infection. Plan  for ERCP to decompress PD                        to resolve persistent pancreatic tail leak.  Providers:           Yovanny Beasley MD, Dimitrios Hunt MD  Referring MD:        Katie Gross MD  Medicines:           General Anesthesia, Levaquin 500 mg IV  Complications:       No immediate complications. Estimated blood loss:                        Minimal.  _______________________________________________ ________________________________  Procedure:           Pre-Anesthesia Assessment:                       - Prior to the procedure, a History and Physical was                        performed, and patient medications and allergies were                        reviewed. The patient is competent. The risks and                        benefits of the procedure and the sedation options and                        risks were discussed with the patient. All questions                        were answered and informed consent was obtained. Patient                        identification and proposed procedure were verified by                        the physician, the nurse, the anesthesiologist and the                        anesthetist in the procedure room. Mental Status                        Examination: alert and oriented. Airway Examination:                        normal oropharyngeal airway and neck mobility.                        Respiratory Examination: clear to auscultation. CV                         Examination: normal. Prophylactic Antibiotics: The                        patient requires prophylactic antibiotics disrupted                        pancreatic duct. Prior Anticoagulants: The patient has                        taken no previous anticoagulant or antiplatelet agents.                        ASA Grade Assessment: III - A patient with severe                        systemic disease. After reviewing the risks and                        benefits, the patient was deemed in satisfactory                         condition to undergo the procedure. The anesthesia plan                        was to use general anesthesia. Immediately prior to                        administration of medications, the patient was                        re-assessed for adequacy to receive sedatives. The heart                        rate, respiratory rate, oxygen saturations, blood                        pressure, adequacy of pulmonary ventilation, and                        response to care  were monitored throughout the                        procedure. The physical status of the patient was                        re-assessed after the procedure.                       After obtaining informed consent, the scope was passed                        under direct vision. Throughout the procedure, the                        patient's blood pressure, pulse, and oxygen saturations                        were monitored continuously. The Duodenoscope was                        introduced through the mouth, and used to inject                        contrast into and used to inject contrast into the bile                        duct and ventral pancreatic duct. The ERCP was                        accomplished without difficulty. The patient tolerated                        the procedure well.                                                                                   Findings:       The  film was normal. The esophagus was successfully intubated        under direct vision. Th e scope was advanced from the mouth to the        duodenum. The pharynx, larynx and associated structures, as well as the        upper GI tract, were normal. The major papilla was normal. Superficial        cannulation of and contrast injection into the ventral pancreatic duct        was accomplished with the short-nosed traction sphincterotome. I        personally interpreted the pancreatic duct images. NovaGold was passed        into the ventral pancreatic duct. The bile  duct was deeply cannulated        with the short-nosed traction sphincterotome. Contrast was injected. The        main bile duct was normal. NovaGold was passed into the biliary tree.        One 5 Fr by 3 cm plastic stent with two external flaps and two internal        flaps was placed 3 cm into the ventral pancreatic duct. Clear fluid        flowed through the stent. The stent was in good position. A 10 mm        biliary sphincterotomy was made with a monofilament traction (standard)        sphincterotome using ERBE  electrocautery. There was no        post-sphincterotomy bleeding. One 7 Fr by 5 cm temporary stent with a        single external pigtail and no internal flaps was placed 5 cm into the        common bile duct. Bile flowed through the stent. The stent was in good        position.                                                                                   Impression:          - One 5 Fr x 3 cm Sofflex plastic stent was placed into                        the pancreatic duct to drain small collections in tail                        via duct decompression.                       - A 10 mm biliary sphincterotomy was performed opening                        up entire sphincter including pancreatic sphincter.                       - Pancreatic sphincterotomy not pursued at this time                        given the increased risks associated and biliary                        sphincterotomy may be sufficient                       - One temporary 7 Fr x 5 cm Mckeon stent with internal                         flange removed was placed into the common bile duct to                        facilitate drainage.  Recommendation:      - Discharge patient to home (ambulatory).                       - Hold warfarin for 3 more days and can then restart                       - As long as patient doing well without signs of further                        duct leak, schedule for abdominal X-ray in 3 weeks to                         confirm stents still present followed by EGD in J-unit                        to remove                                                                                     Electronically signed by DANIELLE Beasley  _____________________  Yovanny Beasley MD  3/30/2018 9:05:32 AM  I was physically present for the entire viewing portion of the exam.  __________________________  Signature of teaching physician  [id number] Beasley MD    ________________  Dimitrios Hunt MD  Number of Addenda: 0    Note Initiated On: 3/29/2018 1:50 PM  Scope In:  Scope Out:     XR Surgery LIBRA G/T 5 Min Fluoro w Stills    Narrative    This exam was marked as non-reportable because it will not be read by a   radiologist or a [facility] non-radiologist provider.             Glucose by meter   Result Value Ref Range    Glucose 192 (H) 70 - 99 mg/dL   Amylase   Result Value Ref Range    Amylase 56 30 - 110 U/L   Lipase   Result Value Ref Range    Lipase 323 73 - 393 U/L   CBC with platelets   Result Value Ref Range    WBC 11.6 (H) 4.0 - 11.0 10e9/L    RBC Count 4.10 (L) 4.4 - 5.9 10e12/L    Hemoglobin 13.3 13.3 - 17.7 g/dL    Hematocrit 41.7 40.0 - 53.0 %     (H) 78 - 100 fl    MCH 32.4 26.5 - 33.0 pg    MCHC 31.9 31.5 - 36.5 g/dL    RDW 14.9 10.0 - 15.0 %    Platelet Count 220 150 - 450 10e9/L   Comprehensive metabolic panel   Result Value Ref Range    Sodium 136 133 - 144 mmol/L    Potassium 4.0 3.4 - 5.3 mmol/L    Chloride 104 94 - 109 mmol/L    Carbon Dioxide 23 20 - 32 mmol/L    Anion Gap 9 3 - 14 mmol/L    Glucose 185 (H) 70 - 99 mg/dL    Urea Nitrogen 14 7 - 30 mg/dL    Creatinine 0.86 0.66 - 1.25 mg/dL    GFR Estimate 88 >60 mL/min/1.7m2    GFR Estimate If Black >90 >60 mL/min/1.7m2    Calcium 9.0 8.5 - 10.1 mg/dL    Bilirubin Total 0.7 0.2 - 1.3 mg/dL    Albumin 3.2 (L) 3.4 - 5.0 g/dL    Protein Total 7.4 6.8 - 8.8 g/dL    Alkaline Phosphatase 49 40 - 150 U/L    ALT 25 0 - 70 U/L    AST 12 0 - 45 U/L   INR   Result Value  Ref Range    INR 1.10 0.86 - 1.14   Glucose by meter   Result Value Ref Range    Glucose 187 (H) 70 - 99 mg/dL     He had 2 stents placed by Dr Beasley. Dr Carlisle looked at the pancreatic cyst and felt it wasn't worrisome. He isn't sure when to restart his coumadin.     Recommendations:          See dictated procedure report for full details (found in chart review under 'Procedures')     - Observe in Same Day for possible discharge  - Discharge home if minimal or no pain  - Hold warfarin and other anticoagulation for 3 more days and can then restart  - Abdominal X-ray in 3 weeks and if stents still present will perform EGD in J-unit to remove     No nausea, no diarrhea. No dysuria, urinary frequency or urgency. Is feeling good.     Problem list and histories reviewed & adjusted, as indicated.  Additional history: as documented    BP Readings from Last 3 Encounters:   04/01/18 129/65   03/30/18 139/79   03/29/18 134/81    Wt Readings from Last 3 Encounters:   04/01/18 207 lb 10.8 oz (94.2 kg)   03/30/18 200 lb (90.7 kg)   03/29/18 200 lb 13.4 oz (91.1 kg)                    Reviewed and updated as needed this visit by clinical staff  Tobacco  Allergies  Meds  Problems       Reviewed and updated as needed this visit by Provider  Allergies  Meds  Problems         ROS:  CONSTITUTIONAL: NEGATIVE for fever, chills, change in weight  ENT/MOUTH: NEGATIVE for ear, mouth and throat problems  RESP: NEGATIVE for significant cough or SOB  CV: NEGATIVE for chest pain, palpitations or peripheral edema  GI: NEGATIVE for nausea, abdominal pain, heartburn, or change in bowel habits  : negative for dysuria, hematuria, decreased urinary stream    OBJECTIVE:                                                    /79 (BP Location: Left arm)  Pulse 70  Temp 97.8  F (36.6  C) (Tympanic)  Resp 16  Wt 200 lb (90.7 kg)  SpO2 94%  BMI 28.7 kg/m2  Body mass index is 28.7 kg/(m^2).  GENERAL: healthy, alert, well nourished, well  hydrated, no distress  NECK: no tenderness, no adenopathy, no asymmetry, no masses, no stiffness; thyroid- normal to palpation  RESP: lungs clear to auscultation - no rales, no rhonchi, no wheezes  CV: regular rates and rhythm today, normal S1 S2, no S3 or S4 and no murmur, no click or rub -  ABDOMEN: soft, no tenderness, no Hepatosplenomegaly, no masses, multiple well healed scars,  normal bowel sounds       ASSESSMENT/PLAN:                                                      ASSESSMENT:  1. Pancreatitis, recurrent (H)    2. Type 2 diabetes mellitus with diabetic polyneuropathy, without long-term current use of insulin (H)    3. Hypertension, benign essential, goal below 140/90    4. IPMN (intraductal papillary mucinous neoplasm)    5. Chronic atrial fibrillation (H)        PLAN:  No orders of the defined types were placed in this encounter.      Patient Instructions   Restart coumadin Monday, can do 5 mg for 2 days, then 2.5 mg, then the usual    Xray 3 weeks         Katie Gross MD  [facility]

## 2018-03-30 NOTE — LETTER
Transition Communication Hand-off for Care Transitions to Next Level of Care Provider    Name: Antoine Russo  : 1945  MRN #: [id number]  Primary Care Provider: Katie Gross     Primary Clinic: [address] [facility] [address]     Reason for Hospitalization:  Bacterial sepsis (H) [A41.9]  Pancreatitis, recurrent (H) [K86.1]  Acute pancreatitis, unspecified complication status, unspecified pancreatitis type [K85.90]  Admit Date/Time: 3/30/2018  7:14 PM  Discharge Date: 18  Payor Source: Payor: BCBS / Plan: BCBS OUT OF STATE / Product Type: Indemnity /            Reason for Communication Hand-off Referral: continuation of care    Discharge Plan:home       Concern for non-adherence with plan of care:   Y/N n  Discharge Needs Assessment:  Needs       Most Recent Value    Equipment Currently Used at Home -- [own SEC and FWW]          Already enrolled in Tele-monitoring program and name of program:    Follow-up specialty is recommended: Yes    Follow-up plan:  Future Appointments  Date Time Provider Department Center   2018 10:30 AM NB ANTI COAG [facility]       Any outstanding tests or procedures:        Referrals     Future Labs/Procedures    GASTROENTEROLOGY ADULT REF CONSULT ONLY     Comments:    Preferred Location: [facility] [phone]      Please be aware that coverage of these services is subject to the terms and limitations of your health insurance plan.  Call member services at your health plan with any benefit or coverage questions.  Any procedures must be performed at a [facility] facility OR coordinated by your clinic's referral office.    Please bring the following with you to your appointment:    (1) Any X-Rays, CTs or MRIs which have been performed.  Contact the facility where they were done to arrange for  prior to your scheduled appointment.    (2) List of current medications   (3) This referral request   (4) Any documents/labs given to you for this referral    INR  Clinic Referral     Medication Therapy Management Referral     Comments:    Reason for referral:  on more than 5 medications and managing chronic disease    This service is designed to help you get the most from your medications.  A specially trained pharmacist will work closely with you and your doctors  to solve any problems related to your medications and to help you get the   best results from taking them.      The Medication Therapy Management staff will call you to schedule an appointment.            Key Recommendations:      Rosaura Pina    AVS/Discharge Summary is the source of truth; this is a helpful guide for improved communication of patient story

## 2018-03-30 NOTE — MR AVS SNAPSHOT
After Visit Summary   3/30/2018    Antoine Russo    MRN: [id number]           Patient Information     Date Of Birth          1945        Visit Information        Provider Department      3/30/2018 8:00 AM Katie Gross MD [facility]        Today's Diagnoses     Pancreatitis, recurrent (H)    -  1    Type 2 diabetes mellitus with diabetic polyneuropathy, without long-term current use of insulin (H)        Hypertension, benign essential, goal below 140/90          Care Instructions    Restart coumadin Monday, can do 5 mg for 2 days, then 2.5 mg, then the usual    Xray 3 weeks              Follow-ups after your visit        Your next 10 appointments already scheduled     Apr 05, 2018 10:30 AM CDT   Anticoagulation Visit with NB ANTI COAG   [facility]    [address] [place] [address] [phone]              Who to contact     If you have questions or need follow up information about today's clinic visit or your schedule please contact [facility] directly at [phone].  Normal or non-critical lab and imaging results will be communicated to you by [employer], letter or phone within 4 business days after the clinic has received the results. If you do not hear from us within 7 days, please contact the clinic through [employer] or phone. If you have a critical or abnormal lab result, we will notify you by phone as soon as possible.  Submit refill requests through [employer] Number or call your pharmacy and they will forward the refill request to us. Please allow 3 business days for your refill to be completed.          Additional Information About Your Visit        MyChart Information     [employer] Number gives you secure access to your electronic health record. If you see a primary care provider, you can also send messages to your care team and make appointments. If you have questions, please call your primary  [facility] clinic.  If you do not have a primary care provider, please call [phone] and they will assist you.        Care EveryWhere ID     This is your Care EveryWhere ID. This could be used by other organizations to access your [facility] [place] medical records  [id number]        Your Vitals Were     Pulse Temperature Respirations Pulse Oximetry BMI (Body Mass Index)       70 97.8  F (36.6  C) (Tympanic) 16 94% 28.7 kg/m2        Blood Pressure from Last 3 Encounters:   03/30/18 139/79   03/29/18 134/81   03/16/18 120/88    Weight from Last 3 Encounters:   03/30/18 200 lb (90.7 kg)   03/29/18 200 lb 13.4 oz (91.1 kg)   03/16/18 198 lb (89.8 kg)              Today, you had the following     No orders found for display         Today's Medication Changes          These changes are accurate as of 3/30/18  8:35 AM.  If you have any questions, ask your nurse or doctor.               These medicines have changed or have updated prescriptions.        Dose/Directions    multivitamin, therapeutic with minerals Tabs tablet   This may have changed:  when to take this   Used for:  Surgery aftercare        Dose:  1 tablet   Take 1 tablet by mouth daily.   Quantity:  30 each   Refills:  1                Primary Care Provider Office Phone # Fax #    Katie Gross [phone]       [address] [place] [address]        Equal Access to Services     ABBEY WALTON AH: Raquel Bernstein, wazoraidada mckayla, qaybta koffi burkett . [facility] [phone].    ATENCIÓN: Si habla español, tiene a snyder disposición servicios gratuitos de asistencia lingüística. Llame al [phone].    We comply with applicable federal civil rights laws and [place] laws. We do not discriminate on the basis of race, color, national origin, age, disability, sex, sexual orientation, or gender identity.            Thank you!     Thank you for choosing [facility]  for your care.  Our goal is always to provide you with excellent care. Hearing back from our patients is one way we can continue to improve our services. Please take a few minutes to complete the written survey that you may receive in the mail after your visit with us. Thank you!             Your Updated Medication List - Protect others around you: Learn how to safely use, store and throw away your medicines at www.disposemymeds.org.          This list is accurate as of 3/30/18  8:35 AM.  Always use your most recent med list.                   Brand Name Dispense Instructions for use Diagnosis    gabapentin 300 MG capsule    NEURONTIN    180 capsule    Take 2 tablest (600 mg) every night    Type 2 diabetes mellitus with diabetic polyneuropathy, without long-term current use of insulin (H)       glipiZIDE 2.5 MG 24 hr tablet    glipiZIDE XL    90 tablet    Take 1 tablet (2.5 mg) by mouth every morning    Type 2 diabetes mellitus with diabetic polyneuropathy, without long-term current use of insulin (H)       lisinopril 10 MG tablet    PRINIVIL/ZESTRIL    90 tablet    Take 1 tablet (10 mg) by mouth daily    Persistent proteinuria       metFORMIN 500 MG 24 hr tablet    GLUCOPHAGE-XR    360 tablet    Take 2 tablets (1,000 mg) by mouth 2 times daily (with meals)    Type 2 diabetes mellitus with diabetic polyneuropathy, without long-term current use of insulin (H)       metoprolol succinate 50 MG 24 hr tablet    TOPROL-XL    180 tablet    Take 1 tablet (50 mg) by mouth 2 times daily    Hypertension, benign essential, goal below 140/90       multivitamin, therapeutic with minerals Tabs tablet     30 each    Take 1 tablet by mouth daily.    Surgery aftercare       tamsulosin 0.4 MG capsule    FLOMAX    90 capsule    Take 1 capsule (0.4 mg) by mouth daily    Urgency of urination, Hypertrophy of prostate without urinary obstruction       warfarin 2.5 MG tablet    COUMADIN    90 tablet    1.25 mg Tues/Sat and 2.5 mg all other days or As  directed by Anticoagulation Clinic    Chronic atrial fibrillation (H)

## 2018-03-30 NOTE — IP AVS SNAPSHOT
MRN:[id number]                      After Visit Summary   3/30/2018    Antoine Russo    MRN: [id number]           Thank you!     Thank you for choosing [facility] for your care. Our goal is always to provide you with excellent care. Hearing back from our patients is one way we can continue to improve our services. Please take a few minutes to complete the written survey that you may receive in the mail after you visit with us. Thank you!        Patient Information     Date Of Birth          1945        About your hospital stay     You were admitted on:  March 31, 2018 You last received care in the:  [address] [facility]    You were discharged on:  April 4, 2018        Reason for your hospital stay       You were admitted for pancreatitis. You were very sick. To prevent another attack we recommend a lot-fat diet. If your symptoms return, please come back to the hospital.                  Who to Call     For medical emergencies, please call 911.  For non-urgent questions about your medical care, please call your primary care provider or clinic, [phone]          Attending Provider     Provider Specialty    Guevara Serra MD Emergency Medicine    Brendan Ewing MD Internal Medicine    Adriana Pickens MD Internal Medicine    Nadeen Herr MD Internal Medicine       Primary Care Provider Office Phone # Fax #    Katie Gross [phone]      After Care Instructions     Activity       Your activity upon discharge: activity as tolerated            Diet       Follow this diet upon discharge: Orders Placed This Encounter      Calorie Counts      Snacks/Supplements Adult: Between Meals      Combination Diet Regular Diet Adult; Low Fat Diet                  Follow-up Appointments     Adult [facility] Follow-up and recommended labs and tests       Follow up with primary care provider, Katie Gross, within 7 days to evaluate treatment change.   We recommend INR, CBC, and BMP, Magnesium, and phosphorus.      Follow up with GI within a few months.     Appointments on [facility] and/or [facility] (with [facility] or [facility] provider or service). Call [phone] if you haven't heard regarding these appointments within 7 days of discharge.                  Your next 10 appointments already scheduled     Apr 05, 2018 10:30 AM CDT   Anticoagulation Visit with NB ANTI COAG   [facility]    [address] [place] [id number] [phone]              Additional Services     GASTROENTEROLOGY ADULT REF CONSULT ONLY       Preferred Location: [facility], [facility] and the [facility] [phone]      Please be aware that coverage of these services is subject to the terms and limitations of your health insurance plan.  Call member services at your health plan with any benefit or coverage questions.  Any procedures must be performed at a [facility] facility OR coordinated by your clinic's referral office.    Please bring the following with you to your appointment:    (1) Any X-Rays, CTs or MRIs which have been performed.  Contact the facility where they were done to arrange for  prior to your scheduled appointment.    (2) List of current medications   (3) This referral request   (4) Any documents/labs given to you for this referral            [facility] Clinic Referral           Medication Therapy Management Referral       Reason for referral:  on more than 5 medications and managing chronic disease    This service is designed to help you get the most from your medications.  A specially trained pharmacist will work closely with you and your doctors  to solve any problems related to your medications and to help you get the   best results from taking them.      The Medication Therapy Management staff will call you to schedule an appointment.                  Pending Results     Date and Time Order Name Status Description    3/31/2018  "0447 Blood culture Preliminary     3/31/2018 0447 Blood culture Preliminary     3/31/2018 0003 Blood culture Preliminary     3/30/2018 2323 Blood culture Preliminary             Statement of Approval     Ordered          04/04/18 1223  I have reviewed and agree with all the recommendations and orders detailed in this document.  EFFECTIVE NOW     Approved and electronically signed by:  Ivy Quintero MD             Admission Information     Date & Time Provider Department Dept. Phone    3/30/2018 Nadeen Herr MD [address] [facility] [phone]      Your Vitals Were     Blood Pressure Pulse Temperature Respirations Height Weight    150/82 (BP Location: Right arm) 90 98  F (36.7  C) (Oral) 18 1.778 m (5' 10\") 92.4 kg (203 lb 9.6 oz)    Pulse Oximetry BMI (Body Mass Index)                93% 29.21 kg/m2          MyChart Information     [facility] gives you secure access to your electronic health record. If you see a primary care provider, you can also send messages to your care team and make appointments. If you have questions, please call your primary care clinic.  If you do not have a primary care provider, please call [phone] and they will assist you.        Care EveryWhere ID     This is your Care EveryWhere ID. This could be used by other organizations to access your [facility] medical records  [id number]        Equal Access to Services     ABBEY WALTON AH: Hadii stoney Bernstein, waaxda luqadaha, qaybta kaalmada adekimo, koffi fay. So [facility] [phone].    ATENCIÓN: Si habla español, tiene a snyder disposición servicios gratuitos de asistencia lingüística. Llame al [phone].    We comply with applicable federal civil rights laws and [place] laws. We do not discriminate on the basis of race, color, national origin, age, disability, sex, sexual orientation, or gender identity.               Review of your medicines      START taking        Dose / " Directions    potassium phosphate (monobasic) 500 MG tablet   Commonly known as:  K-PHOS   Used for:  Hypophosphatemia        Dose:  500 mg   Take 1 tablet (500 mg) by mouth 4 times daily (with meals and nightly) for 4 days   Quantity:  16 tablet   Refills:  0         CONTINUE these medicines which may have CHANGED, or have new prescriptions. If we are uncertain of the size of tablets/capsules you have at home, strength may be listed as something that might have changed.        Dose / Directions    multivitamin, therapeutic with minerals Tabs tablet   This may have changed:  when to take this   Used for:  Surgery aftercare        Dose:  1 tablet   Take 1 tablet by mouth daily.   Quantity:  30 each   Refills:  1         CONTINUE these medicines which have NOT CHANGED        Dose / Directions    gabapentin 300 MG capsule   Commonly known as:  NEURONTIN   Used for:  Type 2 diabetes mellitus with diabetic polyneuropathy, without long-term current use of insulin (H)        Take 2 tablest (600 mg) every night   Quantity:  180 capsule   Refills:  1       glipiZIDE 2.5 MG 24 hr tablet   Commonly known as:  glipiZIDE XL   Used for:  Type 2 diabetes mellitus with diabetic polyneuropathy, without long-term current use of insulin (H)        Dose:  2.5 mg   Take 1 tablet (2.5 mg) by mouth every morning   Quantity:  90 tablet   Refills:  3       lisinopril 10 MG tablet   Commonly known as:  PRINIVIL/ZESTRIL   Used for:  Persistent proteinuria        Dose:  10 mg   Take 1 tablet (10 mg) by mouth daily   Quantity:  90 tablet   Refills:  1       metFORMIN 500 MG 24 hr tablet   Commonly known as:  GLUCOPHAGE-XR   Used for:  Type 2 diabetes mellitus with diabetic polyneuropathy, without long-term current use of insulin (H)        Dose:  1000 mg   Take 2 tablets (1,000 mg) by mouth 2 times daily (with meals)   Quantity:  360 tablet   Refills:  1       metoprolol succinate 50 MG 24 hr tablet   Commonly known as:  TOPROL-XL   Used for:   Hypertension, benign essential, goal below 140/90        Dose:  50 mg   Take 1 tablet (50 mg) by mouth 2 times daily   Quantity:  180 tablet   Refills:  3       tamsulosin 0.4 MG capsule   Commonly known as:  FLOMAX   Used for:  Urgency of urination, Hypertrophy of prostate without urinary obstruction        Dose:  0.4 mg   Take 1 capsule (0.4 mg) by mouth daily   Quantity:  90 capsule   Refills:  3       warfarin 2.5 MG tablet   Commonly known as:  COUMADIN   Used for:  Chronic atrial fibrillation (H)        1.25 mg Tues/Sat and 2.5 mg all other days or As directed by Anticoagulation Clinic   Quantity:  90 tablet   Refills:  0            Where to get your medicines      These medications were sent to [facility] [address] [place] [address] [address] [place] [address]  [address] [facility] [address]     Phone:  [phone]     potassium phosphate (monobasic) 500 MG tablet                Protect others around you: Learn how to safely use, store and throw away your medicines at www.disposemymeds.org.             Medication List: This is a list of all your medications and when to take them. Check marks below indicate your daily home schedule. Keep this list as a reference.      Medications           Morning Afternoon Evening Bedtime As Needed    gabapentin 300 MG capsule   Commonly known as:  NEURONTIN   Take 2 tablest (600 mg) every night   Last time this was given:  600 mg on 4/3/2018  9:14 PM                                glipiZIDE 2.5 MG 24 hr tablet   Commonly known as:  glipiZIDE XL   Take 1 tablet (2.5 mg) by mouth every morning                                lisinopril 10 MG tablet   Commonly known as:  PRINIVIL/ZESTRIL   Take 1 tablet (10 mg) by mouth daily   Last time this was given:  10 mg on 4/4/2018  8:08 AM                                metFORMIN 500 MG 24 hr tablet   Commonly known as:  GLUCOPHAGE-XR   Take 2 tablets (1,000 mg) by mouth 2 times daily (with meals)                                metoprolol  succinate 50 MG 24 hr tablet   Commonly known as:  TOPROL-XL   Take 1 tablet (50 mg) by mouth 2 times daily   Last time this was given:  50 mg on 4/2/2018 12:26 PM                                multivitamin, therapeutic with minerals Tabs tablet   Take 1 tablet by mouth daily.                                potassium phosphate (monobasic) 500 MG tablet   Commonly known as:  K-PHOS   Take 1 tablet (500 mg) by mouth 4 times daily (with meals and nightly) for 4 days   Last time this was given:  500 mg on 4/4/2018 11:57 AM                                tamsulosin 0.4 MG capsule   Commonly known as:  FLOMAX   Take 1 capsule (0.4 mg) by mouth daily   Last time this was given:  0.4 mg on 4/4/2018  8:08 AM                                warfarin 2.5 MG tablet   Commonly known as:  COUMADIN   1.25 mg Tues/Sat and 2.5 mg all other days or As directed by Anticoagulation Clinic   Last time this was given:  2.5 mg on 4/3/2018  5:29 PM

## 2018-03-31 ENCOUNTER — APPOINTMENT (OUTPATIENT)
Dept: GENERAL RADIOLOGY | Facility: CLINIC | Age: 73
End: 2018-03-31
Payer: COMMERCIAL

## 2018-03-31 ENCOUNTER — APPOINTMENT (OUTPATIENT)
Dept: GENERAL RADIOLOGY | Facility: CLINIC | Age: 73
End: 2018-03-31
Attending: INTERNAL MEDICINE
Payer: COMMERCIAL

## 2018-03-31 LAB
ALBUMIN SERPL-MCNC: 2.9 G/DL (ref 3.4–5)
ALBUMIN UR-MCNC: NEGATIVE MG/DL
ALP SERPL-CCNC: 44 U/L (ref 40–150)
ALT SERPL W P-5'-P-CCNC: 16 U/L (ref 0–70)
ANION GAP SERPL CALCULATED.3IONS-SCNC: 19 MMOL/L (ref 3–14)
ANION GAP SERPL CALCULATED.3IONS-SCNC: 7 MMOL/L (ref 3–14)
APPEARANCE UR: CLEAR
AST SERPL W P-5'-P-CCNC: 9 U/L (ref 0–45)
BASE DEFICIT BLDA-SCNC: 10.7 MMOL/L
BASE DEFICIT BLDV-SCNC: 11.7 MMOL/L
BILIRUB SERPL-MCNC: 1 MG/DL (ref 0.2–1.3)
BILIRUB UR QL STRIP: NEGATIVE
BUN SERPL-MCNC: 15 MG/DL (ref 7–30)
BUN SERPL-MCNC: 19 MG/DL (ref 7–30)
CA-I BLD-MCNC: 4.3 MG/DL (ref 4.4–5.2)
CA-I BLD-MCNC: 4.6 MG/DL (ref 4.4–5.2)
CA-I BLD-MCNC: 4.7 MG/DL (ref 4.4–5.2)
CALCIUM SERPL-MCNC: 8.7 MG/DL (ref 8.5–10.1)
CALCIUM SERPL-MCNC: 9 MG/DL (ref 8.5–10.1)
CHLORIDE SERPL-SCNC: 106 MMOL/L (ref 94–109)
CHLORIDE SERPL-SCNC: 109 MMOL/L (ref 94–109)
CO2 SERPL-SCNC: 18 MMOL/L (ref 20–32)
CO2 SERPL-SCNC: 25 MMOL/L (ref 20–32)
COLOR UR AUTO: ABNORMAL
CREAT SERPL-MCNC: 0.94 MG/DL (ref 0.66–1.25)
CREAT SERPL-MCNC: 0.99 MG/DL (ref 0.66–1.25)
CREAT SERPL-MCNC: 1.07 MG/DL (ref 0.66–1.25)
ERYTHROCYTE [DISTWIDTH] IN BLOOD BY AUTOMATED COUNT: 15.6 % (ref 10–15)
GFR SERPL CREATININE-BSD FRML MDRD: 68 ML/MIN/1.7M2
GFR SERPL CREATININE-BSD FRML MDRD: 75 ML/MIN/1.7M2
GFR SERPL CREATININE-BSD FRML MDRD: 79 ML/MIN/1.7M2
GLUCOSE BLDC GLUCOMTR-MCNC: 188 MG/DL (ref 70–99)
GLUCOSE BLDC GLUCOMTR-MCNC: 189 MG/DL (ref 70–99)
GLUCOSE BLDC GLUCOMTR-MCNC: 193 MG/DL (ref 70–99)
GLUCOSE BLDC GLUCOMTR-MCNC: 262 MG/DL (ref 70–99)
GLUCOSE SERPL-MCNC: 229 MG/DL (ref 70–99)
GLUCOSE SERPL-MCNC: 258 MG/DL (ref 70–99)
GLUCOSE UR STRIP-MCNC: 300 MG/DL
HCO3 BLD-SCNC: 15 MMOL/L (ref 21–28)
HCO3 BLDV-SCNC: 18 MMOL/L (ref 21–28)
HCT VFR BLD AUTO: 48.6 % (ref 40–53)
HGB BLD-MCNC: 15.4 G/DL (ref 13.3–17.7)
HGB UR QL STRIP: NEGATIVE
INR PPP: 1.14 (ref 0.86–1.14)
KETONES UR STRIP-MCNC: NEGATIVE MG/DL
LACTATE BLD-SCNC: 1.9 MMOL/L (ref 0.7–2)
LACTATE BLD-SCNC: 11.6 MMOL/L (ref 0.7–2)
LACTATE BLD-SCNC: 14.7 MMOL/L (ref 0.7–2)
LACTATE BLD-SCNC: 2.6 MMOL/L (ref 0.7–2)
LACTATE BLD-SCNC: 4.5 MMOL/L (ref 0.7–2)
LACTATE BLD-SCNC: 5.8 MMOL/L (ref 0.7–2)
LEUKOCYTE ESTERASE UR QL STRIP: NEGATIVE
LIPASE SERPL-CCNC: ABNORMAL U/L (ref 73–393)
MAGNESIUM SERPL-MCNC: 1.5 MG/DL (ref 1.6–2.3)
MAGNESIUM SERPL-MCNC: 2.1 MG/DL (ref 1.6–2.3)
MCH RBC QN AUTO: 33 PG (ref 26.5–33)
MCHC RBC AUTO-ENTMCNC: 31.7 G/DL (ref 31.5–36.5)
MCV RBC AUTO: 104 FL (ref 78–100)
MRSA DNA SPEC QL NAA+PROBE: NEGATIVE
MUCOUS THREADS #/AREA URNS LPF: PRESENT /LPF
NITRATE UR QL: POSITIVE
O2/TOTAL GAS SETTING VFR VENT: ABNORMAL %
O2/TOTAL GAS SETTING VFR VENT: ABNORMAL %
PCO2 BLD: 31 MM HG (ref 35–45)
PCO2 BLDV: 52 MM HG (ref 40–50)
PH BLD: 7.29 PH (ref 7.35–7.45)
PH BLDV: 7.14 PH (ref 7.32–7.43)
PH UR STRIP: 5 PH (ref 5–7)
PHOSPHATE SERPL-MCNC: 4.9 MG/DL (ref 2.5–4.5)
PLATELET # BLD AUTO: 214 10E9/L (ref 150–450)
PLATELET # BLD AUTO: 232 10E9/L (ref 150–450)
PO2 BLD: 115 MM HG (ref 80–105)
PO2 BLDV: 43 MM HG (ref 25–47)
POTASSIUM SERPL-SCNC: 4.2 MMOL/L (ref 3.4–5.3)
POTASSIUM SERPL-SCNC: 4.4 MMOL/L (ref 3.4–5.3)
PROT SERPL-MCNC: 7 G/DL (ref 6.8–8.8)
RADIOLOGIST FLAGS: ABNORMAL
RBC # BLD AUTO: 4.66 10E12/L (ref 4.4–5.9)
RBC #/AREA URNS AUTO: <1 /HPF (ref 0–2)
SODIUM SERPL-SCNC: 140 MMOL/L (ref 133–144)
SODIUM SERPL-SCNC: 143 MMOL/L (ref 133–144)
SOURCE: ABNORMAL
SP GR UR STRIP: 1.04 (ref 1–1.03)
SPECIMEN SOURCE: NORMAL
SQUAMOUS #/AREA URNS AUTO: <1 /HPF (ref 0–1)
UROBILINOGEN UR STRIP-MCNC: NORMAL MG/DL (ref 0–2)
WBC # BLD AUTO: 19.9 10E9/L (ref 4–11)
WBC #/AREA URNS AUTO: 1 /HPF (ref 0–5)
YEAST #/AREA URNS HPF: ABNORMAL /HPF

## 2018-03-31 PROCEDURE — 93005 ELECTROCARDIOGRAM TRACING: CPT

## 2018-03-31 PROCEDURE — 82330 ASSAY OF CALCIUM: CPT | Performed by: INTERNAL MEDICINE

## 2018-03-31 PROCEDURE — 87641 MR-STAPH DNA AMP PROBE: CPT | Performed by: INTERNAL MEDICINE

## 2018-03-31 PROCEDURE — 40000556 ZZH STATISTIC PERIPHERAL IV START W US GUIDANCE

## 2018-03-31 PROCEDURE — 80048 BASIC METABOLIC PNL TOTAL CA: CPT | Performed by: STUDENT IN AN ORGANIZED HEALTH CARE EDUCATION/TRAINING PROGRAM

## 2018-03-31 PROCEDURE — 82565 ASSAY OF CREATININE: CPT | Performed by: INTERNAL MEDICINE

## 2018-03-31 PROCEDURE — 25000132 ZZH RX MED GY IP 250 OP 250 PS 637: Performed by: INTERNAL MEDICINE

## 2018-03-31 PROCEDURE — 87640 STAPH A DNA AMP PROBE: CPT | Performed by: INTERNAL MEDICINE

## 2018-03-31 PROCEDURE — 87088 URINE BACTERIA CULTURE: CPT | Performed by: EMERGENCY MEDICINE

## 2018-03-31 PROCEDURE — 85027 COMPLETE CBC AUTOMATED: CPT | Performed by: INTERNAL MEDICINE

## 2018-03-31 PROCEDURE — 85610 PROTHROMBIN TIME: CPT | Performed by: INTERNAL MEDICINE

## 2018-03-31 PROCEDURE — 74018 RADEX ABDOMEN 1 VIEW: CPT

## 2018-03-31 PROCEDURE — 25000128 H RX IP 250 OP 636: Performed by: STUDENT IN AN ORGANIZED HEALTH CARE EDUCATION/TRAINING PROGRAM

## 2018-03-31 PROCEDURE — 87186 SC STD MICRODIL/AGAR DIL: CPT | Performed by: EMERGENCY MEDICINE

## 2018-03-31 PROCEDURE — 87040 BLOOD CULTURE FOR BACTERIA: CPT | Performed by: INTERNAL MEDICINE

## 2018-03-31 PROCEDURE — 36415 COLL VENOUS BLD VENIPUNCTURE: CPT | Performed by: INTERNAL MEDICINE

## 2018-03-31 PROCEDURE — 83735 ASSAY OF MAGNESIUM: CPT | Performed by: STUDENT IN AN ORGANIZED HEALTH CARE EDUCATION/TRAINING PROGRAM

## 2018-03-31 PROCEDURE — 96365 THER/PROPH/DIAG IV INF INIT: CPT | Mod: 59 | Performed by: EMERGENCY MEDICINE

## 2018-03-31 PROCEDURE — 25000125 ZZHC RX 250

## 2018-03-31 PROCEDURE — 25000131 ZZH RX MED GY IP 250 OP 636 PS 637: Performed by: STUDENT IN AN ORGANIZED HEALTH CARE EDUCATION/TRAINING PROGRAM

## 2018-03-31 PROCEDURE — 93010 ELECTROCARDIOGRAM REPORT: CPT | Mod: 76 | Performed by: INTERNAL MEDICINE

## 2018-03-31 PROCEDURE — 83735 ASSAY OF MAGNESIUM: CPT | Performed by: INTERNAL MEDICINE

## 2018-03-31 PROCEDURE — 25000125 ZZHC RX 250: Performed by: INTERNAL MEDICINE

## 2018-03-31 PROCEDURE — 80053 COMPREHEN METABOLIC PANEL: CPT | Performed by: INTERNAL MEDICINE

## 2018-03-31 PROCEDURE — 83690 ASSAY OF LIPASE: CPT | Performed by: INTERNAL MEDICINE

## 2018-03-31 PROCEDURE — 20000004 ZZH R&B ICU UMMC

## 2018-03-31 PROCEDURE — 00000146 ZZHCL STATISTIC GLUCOSE BY METER IP

## 2018-03-31 PROCEDURE — 25000128 H RX IP 250 OP 636

## 2018-03-31 PROCEDURE — 25000128 H RX IP 250 OP 636: Performed by: EMERGENCY MEDICINE

## 2018-03-31 PROCEDURE — 83605 ASSAY OF LACTIC ACID: CPT | Performed by: STUDENT IN AN ORGANIZED HEALTH CARE EDUCATION/TRAINING PROGRAM

## 2018-03-31 PROCEDURE — 25000128 H RX IP 250 OP 636: Performed by: INTERNAL MEDICINE

## 2018-03-31 PROCEDURE — 81001 URINALYSIS AUTO W/SCOPE: CPT | Performed by: EMERGENCY MEDICINE

## 2018-03-31 PROCEDURE — 85049 AUTOMATED PLATELET COUNT: CPT | Performed by: INTERNAL MEDICINE

## 2018-03-31 PROCEDURE — 99291 CRITICAL CARE FIRST HOUR: CPT | Mod: GC | Performed by: INTERNAL MEDICINE

## 2018-03-31 PROCEDURE — 71045 X-RAY EXAM CHEST 1 VIEW: CPT

## 2018-03-31 PROCEDURE — 82803 BLOOD GASES ANY COMBINATION: CPT | Performed by: INTERNAL MEDICINE

## 2018-03-31 PROCEDURE — 87086 URINE CULTURE/COLONY COUNT: CPT | Performed by: EMERGENCY MEDICINE

## 2018-03-31 PROCEDURE — 83605 ASSAY OF LACTIC ACID: CPT | Performed by: INTERNAL MEDICINE

## 2018-03-31 PROCEDURE — 84100 ASSAY OF PHOSPHORUS: CPT | Performed by: INTERNAL MEDICINE

## 2018-03-31 PROCEDURE — 87040 BLOOD CULTURE FOR BACTERIA: CPT | Performed by: EMERGENCY MEDICINE

## 2018-03-31 PROCEDURE — 25000132 ZZH RX MED GY IP 250 OP 250 PS 637: Performed by: STUDENT IN AN ORGANIZED HEALTH CARE EDUCATION/TRAINING PROGRAM

## 2018-03-31 RX ORDER — PIPERACILLIN SODIUM, TAZOBACTAM SODIUM 3; .375 G/15ML; G/15ML
3.38 INJECTION, POWDER, LYOPHILIZED, FOR SOLUTION INTRAVENOUS EVERY 8 HOURS SCHEDULED
Status: DISCONTINUED | OUTPATIENT
Start: 2018-03-31 | End: 2018-03-31

## 2018-03-31 RX ORDER — METOPROLOL SUCCINATE 50 MG/1
50 TABLET, EXTENDED RELEASE ORAL 2 TIMES DAILY
Status: DISCONTINUED | OUTPATIENT
Start: 2018-03-31 | End: 2018-03-31

## 2018-03-31 RX ORDER — NALOXONE HYDROCHLORIDE 0.4 MG/ML
.1-.4 INJECTION, SOLUTION INTRAMUSCULAR; INTRAVENOUS; SUBCUTANEOUS
Status: DISCONTINUED | OUTPATIENT
Start: 2018-03-31 | End: 2018-04-04 | Stop reason: HOSPADM

## 2018-03-31 RX ORDER — SODIUM CHLORIDE 9 MG/ML
INJECTION, SOLUTION INTRAVENOUS
Status: COMPLETED
Start: 2018-03-31 | End: 2018-03-31

## 2018-03-31 RX ORDER — AMOXICILLIN 250 MG
2 CAPSULE ORAL 2 TIMES DAILY PRN
Status: DISCONTINUED | OUTPATIENT
Start: 2018-03-31 | End: 2018-04-04 | Stop reason: HOSPADM

## 2018-03-31 RX ORDER — SODIUM CHLORIDE, SODIUM LACTATE, POTASSIUM CHLORIDE, CALCIUM CHLORIDE 600; 310; 30; 20 MG/100ML; MG/100ML; MG/100ML; MG/100ML
INJECTION, SOLUTION INTRAVENOUS CONTINUOUS
Status: DISCONTINUED | OUTPATIENT
Start: 2018-03-31 | End: 2018-04-02

## 2018-03-31 RX ORDER — NICOTINE POLACRILEX 4 MG
15-30 LOZENGE BUCCAL
Status: DISCONTINUED | OUTPATIENT
Start: 2018-03-31 | End: 2018-04-04 | Stop reason: HOSPADM

## 2018-03-31 RX ORDER — PIPERACILLIN SODIUM, TAZOBACTAM SODIUM 3; .375 G/15ML; G/15ML
3.38 INJECTION, POWDER, LYOPHILIZED, FOR SOLUTION INTRAVENOUS EVERY 6 HOURS
Status: DISCONTINUED | OUTPATIENT
Start: 2018-03-31 | End: 2018-04-03

## 2018-03-31 RX ORDER — METOPROLOL SUCCINATE 50 MG/1
50 TABLET, EXTENDED RELEASE ORAL 2 TIMES DAILY
Status: DISCONTINUED | OUTPATIENT
Start: 2018-03-31 | End: 2018-04-02

## 2018-03-31 RX ORDER — TAMSULOSIN HYDROCHLORIDE 0.4 MG/1
0.4 CAPSULE ORAL DAILY
Status: DISCONTINUED | OUTPATIENT
Start: 2018-04-01 | End: 2018-04-04 | Stop reason: HOSPADM

## 2018-03-31 RX ORDER — ONDANSETRON 2 MG/ML
4 INJECTION INTRAMUSCULAR; INTRAVENOUS EVERY 6 HOURS PRN
Status: DISCONTINUED | OUTPATIENT
Start: 2018-03-31 | End: 2018-04-04 | Stop reason: HOSPADM

## 2018-03-31 RX ORDER — GABAPENTIN 300 MG/1
600 CAPSULE ORAL AT BEDTIME
Status: DISCONTINUED | OUTPATIENT
Start: 2018-04-01 | End: 2018-04-04 | Stop reason: HOSPADM

## 2018-03-31 RX ORDER — LISINOPRIL 5 MG/1
10 TABLET ORAL DAILY
Status: DISCONTINUED | OUTPATIENT
Start: 2018-04-01 | End: 2018-04-04 | Stop reason: HOSPADM

## 2018-03-31 RX ORDER — ONDANSETRON 4 MG/1
4 TABLET, ORALLY DISINTEGRATING ORAL EVERY 6 HOURS PRN
Status: DISCONTINUED | OUTPATIENT
Start: 2018-03-31 | End: 2018-04-04 | Stop reason: HOSPADM

## 2018-03-31 RX ORDER — AMOXICILLIN 250 MG
1 CAPSULE ORAL 2 TIMES DAILY PRN
Status: DISCONTINUED | OUTPATIENT
Start: 2018-03-31 | End: 2018-04-04 | Stop reason: HOSPADM

## 2018-03-31 RX ORDER — MAGNESIUM SULFATE 1 G/100ML
1 INJECTION INTRAVENOUS ONCE
Status: COMPLETED | OUTPATIENT
Start: 2018-03-31 | End: 2018-03-31

## 2018-03-31 RX ORDER — HYDRALAZINE HYDROCHLORIDE 20 MG/ML
10 INJECTION INTRAMUSCULAR; INTRAVENOUS EVERY 6 HOURS PRN
Status: DISCONTINUED | OUTPATIENT
Start: 2018-03-31 | End: 2018-03-31

## 2018-03-31 RX ORDER — METOPROLOL TARTRATE 1 MG/ML
INJECTION, SOLUTION INTRAVENOUS
Status: COMPLETED
Start: 2018-03-31 | End: 2018-03-31

## 2018-03-31 RX ORDER — PIPERACILLIN SODIUM, TAZOBACTAM SODIUM 3; .375 G/15ML; G/15ML
3.38 INJECTION, POWDER, LYOPHILIZED, FOR SOLUTION INTRAVENOUS EVERY 6 HOURS
Status: DISCONTINUED | OUTPATIENT
Start: 2018-03-31 | End: 2018-03-31

## 2018-03-31 RX ORDER — HYDROMORPHONE HYDROCHLORIDE 1 MG/ML
.3-.5 INJECTION, SOLUTION INTRAMUSCULAR; INTRAVENOUS; SUBCUTANEOUS EVERY 4 HOURS PRN
Status: DISCONTINUED | OUTPATIENT
Start: 2018-03-31 | End: 2018-03-31

## 2018-03-31 RX ORDER — DEXTROSE MONOHYDRATE 25 G/50ML
25-50 INJECTION, SOLUTION INTRAVENOUS
Status: DISCONTINUED | OUTPATIENT
Start: 2018-03-31 | End: 2018-04-04 | Stop reason: HOSPADM

## 2018-03-31 RX ADMIN — VANCOMYCIN HYDROCHLORIDE 1750 MG: 1 INJECTION, POWDER, LYOPHILIZED, FOR SOLUTION INTRAVENOUS at 05:18

## 2018-03-31 RX ADMIN — INSULIN ASPART 2 UNITS: 100 INJECTION, SOLUTION INTRAVENOUS; SUBCUTANEOUS at 20:06

## 2018-03-31 RX ADMIN — PIPERACILLIN AND TAZOBACTAM 3.38 G: 3; .375 INJECTION, POWDER, LYOPHILIZED, FOR SOLUTION INTRAVENOUS; PARENTERAL at 15:42

## 2018-03-31 RX ADMIN — METOPROLOL TARTRATE: 5 INJECTION INTRAVENOUS at 04:00

## 2018-03-31 RX ADMIN — HYDROMORPHONE HYDROCHLORIDE 0.5 MG: 1 INJECTION, SOLUTION INTRAMUSCULAR; INTRAVENOUS; SUBCUTANEOUS at 07:48

## 2018-03-31 RX ADMIN — PIPERACILLIN AND TAZOBACTAM 3.38 G: 3; .375 INJECTION, POWDER, LYOPHILIZED, FOR SOLUTION INTRAVENOUS; PARENTERAL at 00:15

## 2018-03-31 RX ADMIN — HYDROMORPHONE HYDROCHLORIDE 0.5 MG: 1 INJECTION, SOLUTION INTRAMUSCULAR; INTRAVENOUS; SUBCUTANEOUS at 02:33

## 2018-03-31 RX ADMIN — PIPERACILLIN AND TAZOBACTAM 3.38 G: 3; .375 INJECTION, POWDER, LYOPHILIZED, FOR SOLUTION INTRAVENOUS; PARENTERAL at 21:01

## 2018-03-31 RX ADMIN — SODIUM CHLORIDE, POTASSIUM CHLORIDE, SODIUM LACTATE AND CALCIUM CHLORIDE: 600; 310; 30; 20 INJECTION, SOLUTION INTRAVENOUS at 13:08

## 2018-03-31 RX ADMIN — HYDROMORPHONE HYDROCHLORIDE 2 MG: 2 TABLET ORAL at 21:01

## 2018-03-31 RX ADMIN — PIPERACILLIN AND TAZOBACTAM 3.38 G: 3; .375 INJECTION, POWDER, LYOPHILIZED, FOR SOLUTION INTRAVENOUS; PARENTERAL at 06:03

## 2018-03-31 RX ADMIN — METOPROLOL SUCCINATE 50 MG: 50 TABLET, EXTENDED RELEASE ORAL at 07:48

## 2018-03-31 RX ADMIN — CALCIUM GLUCONATE 1 G: 98 INJECTION, SOLUTION INTRAVENOUS at 07:59

## 2018-03-31 RX ADMIN — INSULIN ASPART 1 UNITS: 100 INJECTION, SOLUTION INTRAVENOUS; SUBCUTANEOUS at 23:42

## 2018-03-31 RX ADMIN — MAGNESIUM SULFATE IN DEXTROSE 1 G: 10 INJECTION, SOLUTION INTRAVENOUS at 13:08

## 2018-03-31 RX ADMIN — SODIUM CHLORIDE, POTASSIUM CHLORIDE, SODIUM LACTATE AND CALCIUM CHLORIDE: 600; 310; 30; 20 INJECTION, SOLUTION INTRAVENOUS at 18:31

## 2018-03-31 RX ADMIN — SODIUM CHLORIDE, POTASSIUM CHLORIDE, SODIUM LACTATE AND CALCIUM CHLORIDE 1000 ML: 600; 310; 30; 20 INJECTION, SOLUTION INTRAVENOUS at 02:32

## 2018-03-31 RX ADMIN — METOPROLOL SUCCINATE 50 MG: 50 TABLET, EXTENDED RELEASE ORAL at 02:32

## 2018-03-31 RX ADMIN — METOPROLOL SUCCINATE 50 MG: 50 TABLET, EXTENDED RELEASE ORAL at 20:13

## 2018-03-31 RX ADMIN — Medication 2 G: at 05:37

## 2018-03-31 RX ADMIN — HYDROMORPHONE HYDROCHLORIDE 2 MG: 2 TABLET ORAL at 23:38

## 2018-03-31 RX ADMIN — HYDROMORPHONE HYDROCHLORIDE 0.5 MG: 1 INJECTION, SOLUTION INTRAMUSCULAR; INTRAVENOUS; SUBCUTANEOUS at 15:42

## 2018-03-31 RX ADMIN — SODIUM CHLORIDE, POTASSIUM CHLORIDE, SODIUM LACTATE AND CALCIUM CHLORIDE: 600; 310; 30; 20 INJECTION, SOLUTION INTRAVENOUS at 07:49

## 2018-03-31 RX ADMIN — SODIUM CHLORIDE 1000 ML: 9 INJECTION, SOLUTION INTRAVENOUS at 00:15

## 2018-03-31 RX ADMIN — HYDROMORPHONE HYDROCHLORIDE 2 MG: 2 TABLET ORAL at 18:31

## 2018-03-31 RX ADMIN — HYDROMORPHONE HYDROCHLORIDE 0.5 MG: 1 INJECTION, SOLUTION INTRAMUSCULAR; INTRAVENOUS; SUBCUTANEOUS at 12:09

## 2018-03-31 RX ADMIN — SODIUM CHLORIDE 1000 ML: 9 INJECTION, SOLUTION INTRAVENOUS at 08:00

## 2018-03-31 RX ADMIN — SODIUM CHLORIDE, POTASSIUM CHLORIDE, SODIUM LACTATE AND CALCIUM CHLORIDE 1000 ML: 600; 310; 30; 20 INJECTION, SOLUTION INTRAVENOUS at 06:44

## 2018-03-31 RX ADMIN — SODIUM CHLORIDE, POTASSIUM CHLORIDE, SODIUM LACTATE AND CALCIUM CHLORIDE 1000 ML: 600; 310; 30; 20 INJECTION, SOLUTION INTRAVENOUS at 04:34

## 2018-03-31 ASSESSMENT — PAIN DESCRIPTION - DESCRIPTORS
DESCRIPTORS: ACHING
DESCRIPTORS: ACHING
DESCRIPTORS: ACHING;CONSTANT
DESCRIPTORS: ACHING;CRAMPING;CONSTANT;DISCOMFORT
DESCRIPTORS: ACHING;CONSTANT;CRAMPING
DESCRIPTORS: ACHING
DESCRIPTORS: ACHING;CRAMPING;DISCOMFORT

## 2018-03-31 NOTE — SIGNIFICANT EVENT
Called to bedside for tachyarrhythmia.     Background: 71yo M with known recurrent pancreatitis who is presenting with new onset pancreatitis after a recent biliary stent exchange. Pt initially in sinus rhythm when suddenly developed tachycardia to 180s.     On arrival to the room patient was noted to be in a narrow complex tachyacardia at HR 180s. BP at the time was 120/80s. Patient was actively shivering at the time. Extremities were cold with purple discoloration. Patient was also noted to be confuse.     Review of tele strip show irregular irregular tachycardia consistent with atrial fibrillation. By time of my arrival patient had already received adenosine x2.     IV metoprolol 5mg was given at bedside--> with improvement of HR to the 120s, patient spontaneously cardioverted to sinus rhythm.     Quick bedside TTE revealed hyperdynamic LV with small pleural effusion over RV vs epicardial fat without hemodynamic compromise.     Patient became more warm after receiving fluids.     On re evaluation patient maintaining BP in the 160/80s with HR in the 120s with normal sinus rhythm.     Recommendations:  - treat underlining cause  - would not start po metoprolol at this time     Simon Guzman MD   Cardiology Fellow   1850

## 2018-03-31 NOTE — PLAN OF CARE
Problem: Patient Care Overview  Goal: Plan of Care/Patient Progress Review  PT 4E: Cancel- Per RN, patient with minimal tolerance for activity 2/2 abdominal pain, got up to chair this afternoon with RN. Will reschedule PT Eval.

## 2018-03-31 NOTE — PROGRESS NOTES
[facility] Attending Note:  March 31, 2018    I saw and examined the patient with the residents. I reviewed the labs, imaging studies and cultures.    Please see resident/fellow Dr. Bills's, note from today (March 31, 2018) for details of physical exam, history, medications and labs.  I agree with assessment and plan as documented in said note, with main points listed below.       Antoine Russo is a 72 year old male admitted on 3/30/2018 for severe acute pancreatitis one day after having ERCP.  Lipase very high initially on admission. Lactic acid also very high but improved after IVF. Still with nausea but no vomiting.  CT scan showing possible blood in stomach but clinical picture doesn't fit this. Had MARGARET, better now.   - Continue IVF  - Oral intake as recommended by GI  - GI consultation today  - Serial hemoglobin  - Pain medications  - Stop antibiotics. No evidence of infection. WBC up consistent with stress and pancreatitis.       Critical care time is  30 min excluding procedure time.    Adriana Pickens MD  [phone]

## 2018-03-31 NOTE — H&P
MICU Admission  History &Physical  Antoine Russo MRN: [id number]  Age: 72 year old, : 1945  Date of Admission:3/30/2018  Primary care provider: Katie Gross          Chief Complaint  Abdominal pain         History of Present Illness    71 yo M with h/o recurrent pancreatitis and IPMN (s/p distal pancreatectomy c/b pancreatic leak in ), s/p ERCP with sphincterotomy and stent placement (common bile duct and pancreatic duct to decompress leak) on 3/29 who presents to the ED with severe abdominal pain that began the day of admission. He had been doing well after his procedure until eating a large breakfast of mini-tacos. He describes the pain as somewhat constant but waxing and waning in severity. The pain is located mid abdomen and is associated with movement. It is in a different location than his typical pancreas pain which is toward the left side. Has had emesis x4 with significant nausea. Non bilious, non bloody. Last BM the morning prior to admission, brown, nonbloody. Denies fevers, chills.     In ED pt was gaiven 2 L LR, PRN dilaudid, started on zosyn.           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    Slowly enlarging small pseudocysts off tail     Chronic atrial fibrillation  T2DM with neuropathy  HTN/HLD/PVD/CAD   Stress testing  showed inferior wall ischemia.  Cath with moderate CAD with stenosis of 40-50% in LAD and RCA.   No stents.  Echo in  01/2018 (done while in Afib with rates of 100-110); EF of 55-60%.  No RWMA.  BPH  GERD    C. Diff    Fecal Tx in Dec 2012  Diverticulitis/colon polyp   S/P total colectomy with ileoanal anastamosis  H/O malignant neoplasm of mouth   Squamous cell CA.    S/P transcervical glossectomy and floor of mouth excision with bilateral neck dissections in 8/2012   S/P forearm free flap reconstruction   No radiation.    Difficult intubation        Past Surgical History  Past Surgical History:   Procedure Laterality Date     BREAST SURGERY  2008    right breast mass benign     COLONOSCOPY      multiple polyps removed     COLONOSCOPY  8/24/2011    Procedure:COMBINED COLONOSCOPY, REMOVE TUMOR/POLYP/LESION BY SNARE; Surgeon:MILEY ARBOLEDA; Location:[facility]     COLONOSCOPY  12/17/2012    Procedure: COLONOSCOPY;;  Surgeon: Leon Maurer MD;  [facility]     COLONOSCOPY  12/18/2012    Procedure: COLONOSCOPY;;  Surgeon: Leon Maurer MD;  [facility]     DENERVATION OF SPERMATIC CORD MICROSURGICAL Left 5/23/2017    Procedure: DENERVATION OF SPERMATIC CORD MICROSURGICAL;;  Surgeon: Marcio Aggarwal MD;  [facility]     DISSECTION RADICAL NECK BILATERAL  8/2/2012    Procedure: DISSECTION RADICAL NECK BILATERAL;;  Surgeon: Yung Alvares MD;  [facility]     ENDOSCOPIC RETROGRADE CHOLANGIOPANCREATOGRAM N/A 5/10/2016    Procedure: COMBINED ENDOSCOPIC RETROGRADE CHOLANGIOPANCREATOGRAPHY, PLACE TUBE/STENT;  Surgeon: Yovanny Beasley MD;  [facility]     ENDOSCOPIC RETROGRADE CHOLANGIOPANCREATOGRAM N/A 3/29/2018    Procedure: ENDOSCOPIC RETROGRADE CHOLANGIOPANCREATOGRAM;  Endoscopic Retrograde Cholangiopancreatogram, Endoscopic Ultrasound, Biliary Sphincterotomy, Biliary and Pancreatic Stent Placement;  Surgeon: Yovanny Beasley MD;  [facility]     ENDOSCOPIC ULTRASOUND UPPER GASTROINTESTINAL TRACT (GI) N/A 2/3/2016    Procedure: ENDOSCOPIC ULTRASOUND, ESOPHAGOSCOPY / UPPER  GASTROINTESTINAL TRACT (GI);  Surgeon: Grabiel Plata MD;  [facility]     ENDOSCOPIC ULTRASOUND UPPER GASTROINTESTINAL TRACT (GI) N/A 3/29/2018    Procedure: ENDOSCOPIC ULTRASOUND, ESOPHAGOSCOPY / UPPER GASTROINTESTINAL TRACT (GI);;  Surgeon: Grabiel Plata MD;  [facility]     ESOPHAGOSCOPY, GASTROSCOPY, DUODENOSCOPY (EGD), COMBINED N/A 2/3/2016    Procedure: COMBINED ENDOSCOPIC ULTRASOUND, ESOPHAGOSCOPY, GASTROSCOPY, DUODENOSCOPY (EGD), FINE NEEDLE ASPIRATE/BIOPSY;  Surgeon: Grabiel Plata MD;  [facility]     ESOPHAGOSCOPY, GASTROSCOPY, DUODENOSCOPY (EGD), COMBINED N/A 6/8/2016    Procedure: COMBINED ESOPHAGOSCOPY, GASTROSCOPY, DUODENOSCOPY (EGD), REMOVE FOREIGN BODY;  Surgeon: Yovanny Beasley MD;  [facility]     EXCISE LESION INTRAORAL  6/14/2012    Procedure: EXCISE LESION INTRAORAL;  Wide Local Excision Floor of Mouth, Direct Laryngoscopy, Bilateral Broward's Marsuplization, Split Thickness Skin Graft from right Thigh  Latex Safe;  Surgeon: Gerson Ravi MD;  [facility]     EXCISE LESION INTRAORAL  8/2/2012    Procedure: EXCISE LESION INTRAORAL;  Floor of Mouth Resection, Bilateral Selective Radical Neck Dissection, Tracheostomy, Left Radial Forearm  Free Flap with Alloderm, Nasogastric Feeding Tube Placement,    * Latex Safe*;  Surgeon: Gerson Ravi MD;  [facility]     EXCISE LESION INTRAORAL  12/11/2012    Procedure: EXCISE LESION INTRAORAL;  takedown of oral flap;  Surgeon: Yung Alvares MD;  [facility]     GRAFT FREE VASCULARIZED (LOCATION)  8/2/2012    Procedure: GRAFT FREE VASCULARIZED (LOCATION);;  Surgeon: Yung Alvares MD;  [facility]     GRAFT SKIN SPLIT THICKNESS FROM EXTREMITY  6/14/2012    Procedure: GRAFT SKIN SPLIT THICKNESS FROM EXTREMITY;;  Surgeon: Gerson Ravi MD;  [facility]     LAPAROSCOPIC ILEOSTOMY TAKEDOWN  6/6/2013    Procedure: LAPAROSCOPIC ILEOSTOMY TAKEDOWN;  Laparoscopic Closure of Enterostomy,  Guerda's Type with IleoRectal Anastomosis ;  Surgeon: Grabiel Riddle MD;  [facility]     LAPAROTOMY EXPLORATORY  2012    Procedure: LAPAROTOMY EXPLORATORY;  Exploratory Laparotomy, total abdominal colectomy, ileostomy formation;  Surgeon: Miquel Cannon MD;  [facility]     LARYNGOSCOPY  2012    Procedure: LARYNGOSCOPY;;  Surgeon: Gerson Ravi MD;  [facility]     ORTHOPEDIC SURGERY      ganglian cyst left ankle     PANCREATECTOMY, SPLENECTOMY N/A 3/10/2016    Procedure: PANCREATECTOMY, SPLENECTOMY;  Surgeon: Nael Abel MD;  [facility]     SHOULDER SURGERY  ,     2006- right rotator cuff, 2008 bone spur on left. Dr. Hdez     VARICOCELECTOMY Left 2017    Procedure: VARICOCELECTOMY;  Left Varicocele Repair, Denervation of Left Testis;  Surgeon: Marcio Aggarwal MD;  [facility]              Social History  Social History   Substance Use Topics     Smoking status: Former Smoker     Packs/day: 1.00     Years: 40.00     Types: Cigarettes     Quit date: 2006     Smokeless tobacco: Never Used     Alcohol use Quit in  after 2nd bout of pancreatitis             Family History  Family History   Problem Relation Age of Onset     DIABETES Sister      onset age 50     Alzheimer Disease Mother       80     Alzheimer Disease Father       85     DIABETES Other      nephew type 1     DIABETES Other      Anesthesia Reaction No family hx of      Colon Cancer No family hx of      Colon Polyps No family hx of      Crohn Disease No family hx of      Ulcerative Colitis No family hx of      Family history reviewed and updated in EPIC          Allergies  Allergies   Allergen Reactions     Nkda [No Known Drug Allergies]              Medications  Home meds:  Warfarin   Metoprolol succ 50mg BID  Gabapentin 600mg qhs  Metformin 1000mg BID  Tamsulosin 0.4 mg qday  Lisinopril 10 mg qday  Glipizide 2.5 mg qday  MVI         Vitals  Temp:  [97.7  F (36.5  C)-97.8  F  (36.6  C)] 97.7  F (36.5  C)  Pulse:  [] 117  Heart Rate:  [110-113] 113  Resp:  [16-18] 18  BP: (139-180)/(79-94) 170/94  SpO2:  [94 %-98 %] 95 %        Physical Exam    Gen: Fatigued appearing but otherwise NAD  HEENT:Dry mucous membranes, anicteric sclera, normal voice without obvious deformity  PULM/THORAX:CTAB, breathing comfortably on RA  CV:tacycardic but regular, S1 and S2 appreciated, no extra heart sounds, murmurs or rub auscultated. No JVD. No pedal edema  ABD: well healed midline incision scar, firm, tender to palpation, nondistended. Normoactive bowel sounds x 4, no HSM appreciated.  EXT: warm and well perfused, no clubbing or cyanosis. No asymmetrical edema or tenderness   SKIN: Capillary refill normal. Absent hair on lower extremities.         ROUTINE ICU LABS (Last four results)  WBC 29.2  Lipase 9697  LA 4.7 --> 4.8 after 2L LR  Cr 1.29 BUN 25  Lytes and LFTs wnl, including calcium  Hgb 15.8. Plt 249  Trop <0.015       Microbiology  Blood cultures drawn           Imaging  EKG sinus tachycardia to 120. Normal axis. No ischemic changes.     CT with contrast. Official report pending. Prelim report with acute pancreatitis and pneumobilia.     ERCP and EUS 3/29  - Stent in ventral pancreatic duct for decompressing panc tail leak   - Biliary sphincterotomy  - Stent in common bile duct           Assessment and Plan  72 year old male with chronic EtOH pancreatitis, h/o IPMN s/p resection in 2016 with persistent panc tail leak who underwent ERCP with sphincterotomy and stent placed to common bile duct and pancreatic duct presents with acute pancreatitis.     Neurologic  # Pain: dilaudid 0.3 - 0.5 q4hr, titrated as needed  # Nausea: zofran PRN    # Diabetic neuropathy: Continue home gabapentin 600mg qhs    Cardiovascular  # CAD/PVD/HTN/HLD  # Paroxysmal atrial fibrillation  - Continue toprol 50 mg BID. Hold lisinopril 10 mg qday, tamsulosin 0.4 mg while monitoring hemodynamics.   - Continue to hold  warfarin for a total of 3 day post procedure    Respiratory  No active issues.   If issues do arise, keep in mind pt has abnormal anatomy related to prior malignancy resection.     Gastrointestinal  # Pancreatitis  BiPAP score 1. S/P 2L LR in ED without improvement in LA.   - GI consult in AM due to recent intervention  - NPO  - Repeat LA.   - 1L LR now, additional PRN  - Repeat hep panel in AM given new biliary stent  - Follow-up plan post ERCP: hold warfarin and AC x 3 day post procedure. Abd xray in 3 weeks.  - Re-feed on demand    Infectious Disease  # Severe Sepsis.   Leukocytosis, tachycardia, MARGARET, lactic acidosis. Suspect these are due to pancreatitis rather than true infection but will empirically cover given rise in lactic acid and recent abdominal intervention.  - De-escalate in AM if clinically stable     Antibiotics:   Zosyn 3/29 - present    Cultures:  Blood cultures x 2 on 3/30    Hematology  No active issues.    Endocrine  # T2DM  Hold home metformin 1000mg BID, glipizide 2.5 mg qday    Glucose checks: per unit protocol    Renal/Electolytes/FEN  # MARGARET. Baseline Cr: 0.7 - 0.8. Prerenal versus inflammatory response.   - Fluid bolues PRN  - AM BMP    VOLUME STATUS: Hypovolemic.     Skin Care  No active issues. Monitor per nursing protocol.     PPX:   - VTE: Lovenox  - GI: none indicated  FEN: Bolus PRN, NPO  Consults: GI/pancbili  Tubes/Lines/Drains: PIV x 1  CODE: Full, discussed with pt on admission  Family: Wife aware pt admitted  Dispo: In ICU for monitoring with elevated LA    Patient discussed with staff attending, Dr. Ewing.  Please feel free to page with questions.    Nimco Frank MD    Internal Medicine PGY-3  Pager: [phone]

## 2018-03-31 NOTE — PLAN OF CARE
Patient arriving to unit close to 01:00. Patient stable until just before 04:00, when patient was noted to be shivering severely, with HRs up in the 160-180s, BPs stable but hypertensive,  MICU team informed. Adenosine given x2 (6 / 12mg)  With no success, 5mg metoprolol IV push given, providing success.       Patients Lactic noted to rise drastically to 14.7, team informed, ordering multiple medications and interventions.     Meds given:  2L  LR given since ER. (ER gave 2L as well)  2g Mg+  Vanco + Zosyn.     BC collected, CT completed, X-ray completed. UA sent. Nasal swabs sent.      Patient stable on 2-6L NC. A&Ox4. Voiding 750cc this shift. Dilaudid given for ABD pain. Making needs known.      Meds given:  2L   LR given since floor.  2g Mg+  Vanco + Zosyn.

## 2018-03-31 NOTE — PROGRESS NOTES
MICU Progress Note    Antoine Russo ([id number]) admitted on 3/30/2018  03/31/2018     Assessment and Plans     Assessment and Plan  72 year old male with chronic EtOH pancreatitis, h/o IPMN s/p resection in 2016 with persistent panc tail leak who underwent ERCP with sphincterotomy and stent placed to common bile duct and pancreatic duct on 3/29 presented with acute pancreatitis.      Neurologic  # Pain: dilaudid 0.3 - 0.5 q4hr, titrated as needed  # Nausea: zofran PRN     # Diabetic neuropathy: Continue home gabapentin 600mg qhs     Cardiovascular  # CAD/PVD/HTN/HLD  # Paroxysmal atrial fibrillation  - Continue metoprolol 50 mg BID. Hold lisinopril 10 mg qday, tamsulosin 0.4 mg while monitoring hemodynamics.   - Continue to hold warfarin for a total of 3 day post procedure     # Tachycardia  # HTN  Pt's BP is around 160/90. Not in shock. Tachycardia secondary to Afib.  - Consider resuming home meds.    Respiratory  No active issues.   If issues do arise, keep in mind pt has abnormal anatomy related to prior malignancy resection.      Gastrointestinal  # Pancreatitis  BiSAP score 2. S/P 8L IVF overnight with improvement in LA.   - GI following  - NPO for now. Consider clear liquids depends on his pain.  - LA down trended with fluid resuscitation.  - GI requested zosyn in the setting of post procedure.   - Follow-up plan post ERCP: hold warfarin and AC x 3 day post procedure. Abd xray in 3 weeks.     Infectious Disease  No clear infection  Leukocytosis, tachycardia, lactic acidosis, most likely secondary to acute pancreatitis. Pt got vancomycin and zosyn overnight, but d/c'd vancomycin.  - Will d/c zosyn if blood culture neg for 48 hours     Antibiotics:   Zosyn 3/29 - present     Cultures:  Blood cultures x 2 on 3/30  UA clear     Hematology  No active issues.     Endocrine  # T2DM  Hold home metformin 1000mg BID, glipizide 2.5 mg qday  - BG check q4h  - On medium SSI  - Hypoglycemia  "protocol     Renal/Electolytes/FEN  # MARGARET, resolved. Baseline Cr: 0.7 - 0.8. Most likely pre-renal. Resolved with IVF.     VOLUME STATUS: Hypovolemic.   - On mIVF    Skin Care  No active issues. Monitor per nursing protocol.      PPX:   - VTE: D/C 'd Lovenox given post procedure  - GI: none indicated  FEN: Bolus PRN, NPO  Consults: GI/pancbili  Tubes/Lines/Drains: PIV x 1  CODE: Full, discussed with pt on admission  Family: Wife aware pt admitted  Dispo: Likely transfer to medicine tomorrow    Seen and discussed with Dr. Pickens, who agrees with above assessment and plan.    Janette Bills MD PhD  PGY-1, Internal Medicine  Pager [phone]     Subjective     Abdominal pain, nausea, emesis  4 pt ROS negative except above     Objective     Most recent vital signs:  /77  Pulse 117  Temp 98.4  F (36.9  C) (Oral)  Resp 17  Ht 1.778 m (5' 10\")  Wt 90.3 kg (199 lb 1.2 oz)  SpO2 98%  BMI 28.56 kg/m2  Temp:  [97.7  F (36.5  C)-98.5  F (36.9  C)] 98.4  F (36.9  C)  Pulse:  [117] 117  Heart Rate:  [104-186] 110  Resp:  [11-24] 17  BP: (117-192)/() 141/77  SpO2:  [86 %-100 %] 98 %  Wt Readings from Last 2 Encounters:   03/31/18 90.3 kg (199 lb 1.2 oz)   03/30/18 90.7 kg (200 lb)       Intake/Output Summary (Last 24 hours) at 03/31/18 0652  Last data filed at 03/31/18 0600   Gross per 24 hour   Intake             4090 ml   Output              750 ml   Net             3340 ml     8L IVF so far    Physical exam:  General: NAD  HEENT:  Supple   Cardiac: RRR. No m/r/g. Normal S1, S2.   Pulm: CTAB  Abd: +BS, soft, non distended, tender in the middle upper abdomen  Skin: No new rash/petechiae  MSK: No new deformities, no edema  Neuro:  No new focal deficits    Investigations :  Reviewed and remarkable for:    Abd CT: Significant increase in peripancreatic inflammation and edema, consistent with pancreatitis    Recent Results (from the past 48 hour(s))   CT Abdomen Pelvis w Contrast   Result Value    Radiologist " flags Possible GI bleed (Urgent)    Narrative    EXAMINATION: CT abdomen and pelvis with contrast, 3/30/2018 10:05 PM    TECHNIQUE:  Helical CT images from the lung bases through the  symphysis pubis were obtained with contrast.  Coronal reformatted  images were generated at a workstation for further assessment.    CONTRAST:  123 cc Isovue 370 IV.    COMPARISON: CT 2/25/2018.    HISTORY: Status post ERCP yesterday, now vomiting and pain increase,  perforation versus abscess?    FINDINGS:    Abdomen and pelvis:   Pancreas: New pancreatic ductal stent is in place. Postsurgical  changes of distal pancreatectomy. Unchanged cystic structure at the  distal pancreatic remnant measuring 1.8 cm and containing a small  focus of air, likely representing communication with the pancreatic  duct and relating to recent ERCP and/or stent placement. Significantly  increased peripancreatic inflammation and stranding. With increased  peripancreatic fluid. Fluid extends throughout the abdomen, tracking  inferiorly into the pelvis. There is no discrete or well-defined fluid  collection. Pancreatic parenchyma enhances uniformly without focal  defect to indicate necrosis.    Liver: New biliary stent is in place, and there is new hepatic biliary  ductal dilatation. Mild biliary thickening and enhancement presumably  relates to recent year CP. Mild pneumobilia, also likely related. No  suspicious liver lesions. Portal veins appear patent.  Gallbladder: Hyperenhancing gallbladder mucosa. Gas within the  gallbladder, likely postprocedural.  Spleen: Splenectomy.  Adrenal glands: No adrenal nodules.  Kidneys: Numerous unchanged simple renal cysts bilaterally. Unchanged  right renal cortical scarring. No kidney masses. No hydronephrosis or  obstructing renal stones. Areas of right and left renal cortical  scarring, most pronounced on the right. This may relate to prior  infection or ischemia.  Bladder / Pelvic organs: Unremarkable. Prostate is  not enlarged.  Bowel: Postsurgical changes of subtotal colectomy with reanastomosis  of the sigmoid to the small bowel. No evidence of obstruction.  Duodenal diverticulum and proximal jejunal diverticulum (series 5  image 212). Hyperdense material layering in the posterior stomach does  not change from portal venous phase to 10 minute delay. Thickening of  the gastric wall, especially pronounced at the body and antrum with  thickening of the proximal duodenum. This is likely reactive given  above pancreatic inflammation.  Lymph nodes: There are a few prominent peripancreatic lymph nodes  which are not technically enlarged, for example series 5, image 160  and series 5, image 134).  Peritoneum: No free air. Significantly increased fluid in the upper  abdomen surrounding the gallbladder and pancreas.  Vessels: No infrarenal aortic aneurysm. At least moderate aorta  biiliac atherosclerotic plaque deposition.    Lung bases: No consolidation or pleural effusion. Mild bronchiectasis  in the lower lobes. Lower lobe atelectasis.    Bones and soft tissues: No suspicious osseous lesions. Degenerative  changes of the lumbar spine with moderate spinal canal narrowing at  L3-4 secondary to disc osteophyte. Old left 10th and 11th rib  fractures.      Impression    IMPRESSION:  1. No free intraperitoneal gas to suggest perforation and no discrete  or drainable fluid collection as questioned.   2a. Significant increase in peripancreatic inflammation, as well as  significant intra-abdominal fluid centered about the pancreas,  consistent with severe pancreatitis. No convincing evidence of  pancreatic necrosis, and no defined peripancreatic fluid collection.   New pancreatic duct stent is in place, extending into the duodenum.  2b. Stable appearance of hypodense cystic lesion at the distal  pancreatic remnant. Associated coarse calcifications. There is a small  focus of gas within this collection suggesting a communication with  the  main pancreatic duct. Given these findings, this likely represents  a side branch type IPMN. Pancreatitis sequela is also in the  differential.  3. New biliary stent is in place and there is associated pneumobilia.  4. Hyperdense material layering in the stomach does not change from  portal venous phase to delayed images, making GI bleed unlikely.  5. Hyperenhancing gallbladder and common bile duct mucosa, likely  secondary to surrounding inflammation and recent procedure.    I have personally reviewed the examination and initial interpretation  and I agree with the findings.    SARAH MICHELE MD   XR Abdomen 1 View    Narrative    Abdominal radiograph one view  3/31/2018 6:41 AM      HISTORY: Upright, evaluate for perforation    COMPARISON: CT earlier today    FINDINGS: Single upright view of the abdomen was obtained.  Nonobstructive bowel gas pattern. No free air. Pneumobilia, as seen on  3/30/2018 CT. Biliary and pancreatic stents are in place. Multiple  surgical clips projecting over the left upper abdomen.      Impression    IMPRESSION: No evidence of free air.    I have personally reviewed the examination and initial interpretation  and I agree with the findings.    JENSEN HASSAN MD   XR Chest Port 1 View    Narrative    EXAM: XR CHEST PORT 1 VW  3/31/2018 8:14 AM     HISTORY:  pancreatitis    COMPARISON: Chest radiograph dated 4/19/2016, CT chest 7/31/2017    FINDINGS: A single AP view of the chest is obtained.     The cardiomediastinal silhouette is within normal limits. The  costophrenic angles project off this image. No pneumothorax. Small  left pleural effusion. Linear opacities of the left lung base slightly  increased from previous radiograph. Normal lung volumes.  Atherosclerotic calcifications of the aortic arch.    The visualized upper abdomen is unremarkable. No acute osseous  abnormality.      Impression    IMPRESSION:   1. Linear opacities the left lung base are favored to  represent  atelectasis over infection.  2. Small left pleural effusion.    I have personally reviewed the examination and initial interpretation  and I agree with the findings.    SURYA FRIED MD       CMP    Recent Labs  Lab 03/31/18  1401 03/31/18 0416 03/31/18  0140 03/30/18 1957 03/29/18  0842    143  --  140 136   POTASSIUM 4.2 4.4  --  4.6 4.0   CHLORIDE 109 106  --  102 104   CO2 25 18*  --  27 23   ANIONGAP 7 19*  --  11 9   * 258*  --  261* 185*   BUN 15 19  --  25 14   CR 0.94 1.07 0.99 1.29* 0.86   GFRESTIMATED 79 68 75 55* 88   GFRESTBLACK >90 83 >90 67 >90   NILSON 8.7 9.0  --  9.8 9.0   MAG 2.1 1.5*  --   --   --    PHOS  --  4.9*  --   --   --    PROTTOTAL  --  7.0  --  7.9 7.4   ALBUMIN  --  2.9*  --  3.4 3.2*   BILITOTAL  --  1.0  --  0.8 0.7   ALKPHOS  --  44  --  48 49   AST  --  9  --  21 12   ALT  --  16  --  22 25     Lipase 98622  Lactate 14->11->6->2->1    CBC    Recent Labs  Lab 03/31/18  0416 03/31/18 0140 03/30/18 1957 03/29/18  0842   WBC 19.9*  --  29.2* 11.6*   RBC 4.66  --  4.82 4.10*   HGB 15.4  --  15.8 13.3   HCT 48.6  --  48.4 41.7   *  --  100 102*   MCH 33.0  --  32.8 32.4   MCHC 31.7  --  32.6 31.9   RDW 15.6*  --  15.1* 14.9    214 249 220     INR    Recent Labs  Lab 03/31/18  0416 03/29/18  1418   INR 1.14 1.10     Arterial Blood Gas    Recent Labs  Lab 03/31/18  0420 03/31/18  0400   PH 7.29*  --    PCO2 31*  --    PO2 115*  --    HCO3 15*  --    O2PER 6L 6L

## 2018-03-31 NOTE — ED NOTES
[facility], [place]   ED Nurse to Floor Handoff     Antoine Russo is a 72 year old male who speaks English and lives with a spouse,  in a home  They arrived in the ED by car from home    ED Chief Complaint: Abdominal Pain    ED Dx;   Final diagnoses:   Pancreatitis, recurrent (H)   Bacterial sepsis (H)         Needed?: No    Allergies:   Allergies   Allergen Reactions     Nkda [No Known Drug Allergies]    .  Past Medical Hx:   Past Medical History:   Diagnosis Date     C. 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     Noninfectious ileitis       Baseline Mental status: WDL  Current Mental Status changes: WDL    Infection present or suspected this encounter: Yes  Sepsis suspected: Yes  Isolation type: No active isolations     Activity level - Baseline/Home:  Independent  Activity Level - Current:   Stand with Assist    Bariatric equipment needed?: No    In the ED these meds were given:   Medications   lactated ringers BOLUS 1,000 mL (0 mLs Intravenous Stopped 3/30/18 2238)     Followed by   lactated ringers BOLUS 1,000 mL (0 mLs Intravenous Stopped 3/30/18 2224)     Followed by   lactated ringers infusion (not administered)   0.9% sodium chloride BOLUS (1,000 mLs Intravenous New Bag 3/31/18 0015)     Followed by   0.9% sodium chloride BOLUS (0 mLs Intravenous Stopped 3/31/18 0005)     Followed by   sodium chloride 0.9% infusion (not administered)   piperacillin-tazobactam (ZOSYN) 3.375 g vial to attach to  mL bag (3.375 g Intravenous New Bag 3/31/18 0015)   HYDROmorphone (PF) (DILAUDID) injection 0.5 mg (0.5 mg Intravenous Given 3/30/18 2342)   ondansetron (ZOFRAN) injection 8 mg (8 mg Intravenous Given 3/30/18 2008)   HYDROmorphone (PF) (DILAUDID) injection 0.5 mg (0.5 mg Intravenous Given 3/30/18 2224)    iopamidol (ISOVUE-370) solution 123 mL (123 mLs Intravenous Given 3/30/18 2152)   sodium chloride 0.9 % bag 500mL for CT scan flush use (81 mLs Intravenous Given 3/30/18 2152)   ondansetron (ZOFRAN) injection 4 mg (4 mg Intravenous Given 3/30/18 2310)       Drips running?  Yes Normal saline and IV abx    Home pump  No    Current LDAs  Peripheral IV 03/30/18 Right (Active)   Number of days:1       Ileostomy (Active)   Number of days:1926       Pressure Ulcer 12/30/12 Left Ear PU 2/2 O2 tubing (Active)   Number of days:1917       Pressure Ulcer 04/05/16 Bilateral Buttocks inner folds of butucks (Active)   Number of days:725       Incision/Surgical Site Anterior Mouth (Active)   Number of days:       Incision/Surgical Site 08/02/12 Bilateral Neck (Active)   Number of days:2067       Incision/Surgical Site 08/02/12 Left Arm (Active)   Number of days:2067       Incision/Surgical Site 08/02/12 Mid Neck (Active)   Number of days:2067       Incision/Surgical Site 08/02/12 Mouth (Active)   Number of days:2067       Incision/Surgical Site 12/11/12 Inner Mouth (Active)   Number of days:1936       Incision/Surgical Site Inner Mouth (Active)   Number of days:       Incision/Surgical Site 12/20/12 Anterior Abdomen (Active)   Number of days:1927       Incision/Surgical Site 06/06/13 Right Abdomen (Active)   Number of days:1759       Incision/Surgical Site 06/06/13 Abdomen (Active)   Number of days:1759       Incision/Surgical Site 05/23/17 Left Groin (Active)   Number of days:312       Labs results:   Labs Ordered and Resulted from Time of ED Arrival Up to the Time of Departure from the ED   CBC WITH PLATELETS DIFFERENTIAL - Abnormal; Notable for the following:        Result Value    WBC 29.2 (*)     RDW 15.1 (*)     Absolute Neutrophil 26.5 (*)     All other components within normal limits   COMPREHENSIVE METABOLIC PANEL - Abnormal; Notable for the following:     Glucose 261 (*)     Creatinine 1.29 (*)     GFR Estimate 55 (*)   "   All other components within normal limits   LIPASE - Abnormal; Notable for the following:     Lipase 9697 (*)     All other components within normal limits   LACTIC ACID WHOLE BLOOD - Abnormal; Notable for the following:     Lactic Acid 4.7 (*)     All other components within normal limits   LACTIC ACID WHOLE BLOOD - Abnormal; Notable for the following:     Lactic Acid 4.8 (*)     All other components within normal limits   TROPONIN I   ROUTINE UA WITH MICROSCOPIC   BLOOD CULTURE   BLOOD CULTURE   BLOOD CULTURE   URINE CULTURE AEROBIC BACTERIAL       Imaging Studies:   Recent Results (from the past 24 hour(s))   CT Abdomen Pelvis w Contrast   Result Value    Radiologist flags Possible GI bleed (Urgent)    Impression    IMPRESSION:   1.  Significant increase in peripancreatic inflammation and edema,  consistent with pancreatitis.  2.  Pneumobilia and air in the fluid collection at the tail of the  pancreas, likely secondary to pancreatic duct and biliary stents.  3.  Hyperdense material layering in the stomach does not change from  portal venous phase to delayed images, making GI bleed unlikely.  4.  Hyperenhancing gallbladder mucosa.            Recent vital signs:   BP (!) 182/94  Pulse 117  Temp 97.7  F (36.5  C) (Oral)  Resp 11  Ht 1.778 m (5' 10\")  Wt 90.7 kg (200 lb)  SpO2 97%  BMI 28.7 kg/m2    Cardiac Rhythm: Tachycardia  Pt needs tele? Yes  Skin/wound Issues: None    Code Status: Full Code    Pain control: fair    Nausea control: fair    Abnormal labs/tests/findings requiring intervention: Lactic acid 4.8, currently on 4th bolus.    Family present during ED course? No   Family Comments/Social Situation comments:     Tasks needing completion: None    Aurora Doyle RN  [facility]-- [phone] [facility]  [phone] [facility]    "

## 2018-03-31 NOTE — CONSULTS
GASTROENTEROLOGY CONSULTATION      Date of Admission:  3/30/2018          ASSESSMENT AND RECOMMENDATIONS:   Assessment:  72 year old male with PMH significant for afib on warfarin s/p distal pancreatectomy for IPMN of tail in the setting of chronic pancreatitis. Margins negative by path. Had ongoing PD leak, treated with PD stent on 5/2016. Did well until last 4 months, 3   admisssions for acute pancreatitis by enzymes and CT. CT on 2/25/18 shows slowly enlarging small pseudcysts off tail.  Was drinking ETOH 4-5 per day until January. EUS performed 3/29 shows small fluid collection at the resection margin but not sampled due to the risk of infection. ERCP 3/29 with PD and CBD stent placed with sphincterotomy. Now presented to the ED with worsening abdominal pain, lactic acidosis and was found to have acute pancreatitis on CT and elevated lipase to ~9000. Patient BISAP score of 2-3. CT A/P on admission with worsening peripancreatic collection but stents in place. Likely this is in the setting of post ERCP pancreatitis. We will recommend antibiotics given instrumentation and aggressive hydration.       Recommendations  --Please aggressive hydration with LR 200cc/hr  --Trend LFTs and BMP  --Pain management per primary team   --Please hold anticoagulation   --continue broad spectrum antibiotics for now given instrumentation   --NPO for now although we will consider clear liquids tomorrow if improving   --We will continue to follow     Gastroenterology follow up recommendations: LUIS    Thank you for involving us in this patient's care. Please do not hesitate to contact the GI service with any questions or concerns.     Pt care plan discussed with Dr. Beasley, GI staff physician.    Trenton Cristina  -------------------------------------------------------------------------------------------------------------------          Chief Complaint:   We were asked by Dr. Pickens of [facility] to evaluate this patient with Acute  pancreatitis    History is obtained from the patient and the medical record.          History of Present Illness:   Antoine Russo is a 72 year old male with PMH significant for afib on warfarin s/p distal pancreatectomy for IPMN of tail in the setting of chronic pancreatitis. Margins negative by path. Had ongoing PD leak, treated with PD stent on 5/2016. Did well until last 4 months, 3   admisssions for acute pancreatitis by enzymes and CT. CT on 2/25/18 shows slowly enlarging small pseudcysts off tail.  Was drinking ETOH 4-5 per day until January. EUS performed 3/29 shows small fluid collection at the resection margin but not sampled due to the risk of infection. ERCP 3/29 with PD and CBD stent placed with sphincterotomy. Now presented to the ED with worsening abdominal pain, lactic acidosis and was found to have acute pancreatitis on CT and elevated lipase to ~9000. Patient reports he was doing fine until he had some tacos and pain started there after. He reports nausea and some vomiting. Denies any bleeding or fevers.            Past Medical History:   Reviewed and edited as appropriate  Past Medical History:   Diagnosis Date     C. 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     Noninfectious ileitis             Past Surgical History:   Reviewed and edited as appropriate   Past Surgical History:   Procedure Laterality Date     BREAST SURGERY  2008    right breast mass benign     COLONOSCOPY      multiple polyps removed     COLONOSCOPY  8/24/2011    Procedure:COMBINED COLONOSCOPY, REMOVE TUMOR/POLYP/LESION BY SNARE; Surgeon:MILEY ARBOLEDA; Location:[facility]     COLONOSCOPY  12/17/2012    Procedure: COLONOSCOPY;;  Surgeon: Leon Maurer MD;  Location:  GI     COLONOSCOPY  12/18/2012    Procedure: COLONOSCOPY;;  Surgeon: Leon Maurer,  MD;  Location: [facility]     DENERVATION OF SPERMATIC CORD MICROSURGICAL Left 5/23/2017    Procedure: DENERVATION OF SPERMATIC CORD MICROSURGICAL;;  Surgeon: Marcio Aggarwal MD;  [facility]     DISSECTION RADICAL NECK BILATERAL  8/2/2012    Procedure: DISSECTION RADICAL NECK BILATERAL;;  Surgeon: Yung Alvares MD;  [facility]     ENDOSCOPIC RETROGRADE CHOLANGIOPANCREATOGRAM N/A 5/10/2016    Procedure: COMBINED ENDOSCOPIC RETROGRADE CHOLANGIOPANCREATOGRAPHY, PLACE TUBE/STENT;  Surgeon: Yovanny Beasley MD;  [facility]     ENDOSCOPIC RETROGRADE CHOLANGIOPANCREATOGRAM N/A 3/29/2018    Procedure: ENDOSCOPIC RETROGRADE CHOLANGIOPANCREATOGRAM;  Endoscopic Retrograde Cholangiopancreatogram, Endoscopic Ultrasound, Biliary Sphincterotomy, Biliary and Pancreatic Stent Placement;  Surgeon: Yovanny Beasley MD;  [facility]     ENDOSCOPIC ULTRASOUND UPPER GASTROINTESTINAL TRACT (GI) N/A 2/3/2016    Procedure: ENDOSCOPIC ULTRASOUND, ESOPHAGOSCOPY / UPPER GASTROINTESTINAL TRACT (GI);  Surgeon: Grabiel Plata MD;  [facility]     ENDOSCOPIC ULTRASOUND UPPER GASTROINTESTINAL TRACT (GI) N/A 3/29/2018    Procedure: ENDOSCOPIC ULTRASOUND, ESOPHAGOSCOPY / UPPER GASTROINTESTINAL TRACT (GI);;  Surgeon: Grabiel Plata MD;  [facility]     ESOPHAGOSCOPY, GASTROSCOPY, DUODENOSCOPY (EGD), COMBINED N/A 2/3/2016    Procedure: COMBINED ENDOSCOPIC ULTRASOUND, ESOPHAGOSCOPY, GASTROSCOPY, DUODENOSCOPY (EGD), FINE NEEDLE ASPIRATE/BIOPSY;  Surgeon: Grabiel Plata MD;  [facility]     ESOPHAGOSCOPY, GASTROSCOPY, DUODENOSCOPY (EGD), COMBINED N/A 6/8/2016    Procedure: COMBINED ESOPHAGOSCOPY, GASTROSCOPY, DUODENOSCOPY (EGD), REMOVE FOREIGN BODY;  Surgeon: Yovanny Beasley MD;  [facility]     EXCISE LESION INTRAORAL  6/14/2012    Procedure: EXCISE LESION INTRAORAL;  Wide Local Excision Floor of Mouth, Direct Laryngoscopy, Bilateral Ida's Marsuplization, Split Thickness  Skin Graft from right Thigh  Latex Safe;  Surgeon: Gerson Ravi MD;  [facility]     EXCISE LESION INTRAORAL  8/2/2012    Procedure: EXCISE LESION INTRAORAL;  Floor of Mouth Resection, Bilateral Selective Radical Neck Dissection, Tracheostomy, Left Radial Forearm  Free Flap with Alloderm, Nasogastric Feeding Tube Placement,    * Latex Safe*;  Surgeon: Gerson Raiv MD;  [facility]     EXCISE LESION INTRAORAL  12/11/2012    Procedure: EXCISE LESION INTRAORAL;  takedown of oral flap;  Surgeon: Yung Alvares MD;  [facility]     GRAFT FREE VASCULARIZED (LOCATION)  8/2/2012    Procedure: GRAFT FREE VASCULARIZED (LOCATION);;  Surgeon: Yung Alvares MD;  [facility]     GRAFT SKIN SPLIT THICKNESS FROM EXTREMITY  6/14/2012    Procedure: GRAFT SKIN SPLIT THICKNESS FROM EXTREMITY;;  Surgeon: Gersno Ravi MD;  [facility]     LAPAROSCOPIC ILEOSTOMY TAKEDOWN  6/6/2013    Procedure: LAPAROSCOPIC ILEOSTOMY TAKEDOWN;  Laparoscopic Closure of Enterostomy, Guerda's Type with IleoRectal Anastomosis ;  Surgeon: Grabiel Riddle MD;  [facility]     LAPAROTOMY EXPLORATORY  12/20/2012    Procedure: LAPAROTOMY EXPLORATORY;  Exploratory Laparotomy, total abdominal colectomy, ileostomy formation;  Surgeon: Miquel Cannon MD;  [facility]     LARYNGOSCOPY  6/14/2012    Procedure: LARYNGOSCOPY;;  Surgeon: Gerson Ravi MD;  [facility]     ORTHOPEDIC SURGERY      ganglian cyst left ankle     PANCREATECTOMY, SPLENECTOMY N/A 3/10/2016    Procedure: PANCREATECTOMY, SPLENECTOMY;  Surgeon: Nael Abel MD;  [facility]     SHOULDER SURGERY  2006, 2008    2006- right rotator cuff, 2008 bone spur on left. Dr. Hdez     VARICOCELECTOMY Left 5/23/2017    Procedure: VARICOCELECTOMY;  Left Varicocele Repair, Denervation of Left Testis;  Surgeon: Marcio Aggarwal MD;  [facility]            Previous Endoscopy:     Results for orders placed or performed during the hospital encounter of  12/16/12   COLONOSCOPY   Result Value Ref Range    COLONOSCOPY       [facility], [place]   [address] [place] [address] [phone]     Endoscopy Department  _______________________________________________________________________________  Patient Name: Antoine Russo        Procedure Date: 12/18/2012 12:12:SS   A12/P12  MRN: [id number]                       Account Number: [id number]                  YOB: 1945             Admit Type: Inpatient                       Age: 66                               Room:                                       Gender: Male                          Note Status: Finalized                      Attending MD: Leon Maurer MD      _______________________________________________________________________________     Procedure:                Colonoscopy  Indications:              Megacolon  Providers:                Leon Maurer MD, Anushka Yang RN  Patient Profile:          This is a 66 year old male with C. difficile                             associated toxic megacolon. The patient clinically                             improved yesterday, but clinical progress stalled                             today and CRP increased up to 154 this morning.  Referring MD:             Zac Mayes MD  Medicines:                Midazolam 1 mg IV, Fentanyl 100 micrograms IV  Complications:            No immediate complications  _______________________________________________________________________________  Procedure:                Pre-Anesthesia Assessment:                            - Airway Examination: normal oropharyngeal airway                             and neck mobility.                            - Respiratory Examination: clear to auscultation.                            - CV Examination: irregularly, irregular rhythm                             with HR of 125-140.                            - ASA Grade Assessment:  III - A patient with severe                             systemic disease.                            - After reviewing the risks and benefits, the                             patient was deemed in satisfactory condition to                             undergo the procedure.                            - The anesthesia plan was to use moderate                             sedation/analgesia (conscious sedation).                            - Immediately prior to administration of                             medications, the patient was re-assessed for                             adequacy to receive sedatives.                            - The heart rate, respiratory rate, oxygen                             saturations, blood pressure, adequacy of pulmonary                             ventilation, and response to care were monitored                             throughout the procedure.                            - The physical status of the patient was                             re-assessed after the procedure.                            After obtaining informed consent, the colonoscope                             was passed under direct vision. Throughout the                             procedure, the patient's blood pressure, pulse, and                             oxygen saturations were monitored continuously. The                             Colonoscope was introduced through the anus and                             advanced to the ileocecal valve. The colonoscopy                             was performed without difficulty with CO2                             insufflation. The patient tolerated the procedure                             well. The quality of the bowel preparation was fair.                                                                                   Findings:       Diffuse pseudomembranes were found in the transverse colon and in the        ascending colon. Copious GoLytely prep was aspirated. FMT  was repeated.        Gas was aspirated on withdrawal.                                                                                   Impression:               - Preparation of the colon was fair.                            - Pseudomembranous enterocolitis.  Recommendation:           - Return patient to hospital [facility] for ongoing care.                            - Patient's atrial fibrillation is still not rate                             controlled. He may need increased dosage of beta                             blocker.                            - Antibiotics should still be held at this time.                            - NG tube can be discontinued and patient can be                             started on clear liquid diet.                            - He should continue to be closely monitored with                             physical examination, CRP levels, and WBC count.                                                                                     Signed Electronically by Leon Maurer MD  _____________________  Leon Maurer MD  Signed Date: 12/18/2012 14:12:SS A12/P12  Number of Addenda: 0  I was physically present for the entire viewing portion of the exam.  __________________________  Signature of teaching physician  B4c/D4c  Note Initiated On: 12/18/2012 12:12:SS A12/P12  Scope Withdrawal Time:   Scope Withdrawal Time:   Total Procedure Duration:             Social History:   Reviewed and edited as appropriate  Social History     Social History     Marital status:      Spouse name: N/A     Number of children: N/A     Years of education: N/A     Occupational History     [employer]      20 hr/week monday-Thur 7-noon     [employer]     RETIRED      Social History Main Topics     Smoking status: Former Smoker     Packs/day: 1.00     Years: 40.00     Types: Cigarettes     Quit date: 11/24/2006     Smokeless tobacco: Never Used      "Alcohol use No     Drug use: No     Sexual activity: Yes     Partners: Female     Other Topics Concern      Service Yes     [employer] 6 years     Blood Transfusions No     Caffeine Concern Yes     0-2 cups     Occupational Exposure No     retired     Hobby Hazards No     Sleep Concern No     Stress Concern No     Weight Concern No     Special Diet Yes     healthy     Back Care No     Exercise Yes     Bike Helmet Not Asked     N/A     Seat Belt Yes     Self-Exams Yes     Parent/Sibling W/ Cabg, Mi Or Angioplasty Before 65f 55m? No     Social History Narrative    Son lives in [place] with 2 grandchildren 19 and 6            Family History:   Reviewed and edited as appropriate  No known history of gastrointestinal/liver disease or  gastrointestinal malignancies       Allergies:   Reviewed and edited as appropriate     Allergies   Allergen Reactions     Nkda [No Known Drug Allergies]             Medications:     Current Facility-Administered Medications   Medication     [START ON 4/1/2018] gabapentin (NEURONTIN) capsule 600 mg     naloxone (NARCAN) injection 0.1-0.4 mg     ondansetron (ZOFRAN-ODT) ODT tab 4 mg    Or     ondansetron (ZOFRAN) injection 4 mg     senna-docusate (SENOKOT-S;PERICOLACE) 8.6-50 MG per tablet 1 tablet    Or     senna-docusate (SENOKOT-S;PERICOLACE) 8.6-50 MG per tablet 2 tablet     metoprolol succinate (TOPROL-XL) 24 hr tablet 50 mg     HYDROmorphone (PF) (DILAUDID) injection 0.3-0.5 mg     lactated ringers infusion     piperacillin-tazobactam (ZOSYN) 3.375 g vial to attach to  mL bag     insulin aspart (NovoLOG) inj (RAPID ACTING)     glucose 40 % gel 15-30 g    Or     dextrose 50 % injection 25-50 mL    Or     glucagon injection 1 mg             Review of Systems:   A complete review of systems was performed and is negative except as noted in the HPI           Physical Exam:   /71  Pulse 117  Temp 98.5  F (36.9  C) (Oral)  Resp 18  Ht 1.778 m (5' 10\")  Wt 90.3 kg (199 " lb 1.2 oz)  SpO2 99%  BMI 28.56 kg/m2  Wt:   Wt Readings from Last 2 Encounters:   03/31/18 90.3 kg (199 lb 1.2 oz)   03/30/18 90.7 kg (200 lb)      Constitutional: cooperative, pleasant, not dyspneic/diaphoretic, no acute distress  Eyes: Sclera anicteric/injected  Ears/nose/mouth/throat: Normal oropharynx without ulcers or exudate, mucus membranes moist, hearing intact  Neck: supple, thyroid normal size  CV: No edema  Respiratory: Unlabored breathing  Lymph: No axillary, submandibular, supraclavicular or inguinal lymphadenopathy  Abd:  Nondistended, +bs, no hepatosplenomegaly, tender diffusely, no peritoneal signs  Skin: warm, perfused, no jaundice  Neuro: AAO x 3, No asterixis  Psych: Normal affect  MSK: No gross deformities         Data:   Labs and imaging below were independently reviewed and interpreted    BMP  Recent Labs  Lab 03/31/18 0416 03/31/18 0140 03/30/18 1957 03/29/18  0842     --  140 136   POTASSIUM 4.4  --  4.6 4.0   CHLORIDE 106  --  102 104   NILSON 9.0  --  9.8 9.0   CO2 18*  --  27 23   BUN 19  --  25 14   CR 1.07 0.99 1.29* 0.86   *  --  261* 185*     CBC  Recent Labs  Lab 03/31/18 0416 03/31/18 0140 03/30/18 1957 03/29/18  0842   WBC 19.9*  --  29.2* 11.6*   RBC 4.66  --  4.82 4.10*   HGB 15.4  --  15.8 13.3   HCT 48.6  --  48.4 41.7   *  --  100 102*   MCH 33.0  --  32.8 32.4   MCHC 31.7  --  32.6 31.9   RDW 15.6*  --  15.1* 14.9    214 249 220     INR  Recent Labs  Lab 03/29/18  1418   INR 1.10     LFTs  Recent Labs  Lab 03/31/18 0416 03/30/18 1957 03/29/18  0842   ALKPHOS 44 48 49   AST 9 21 12   ALT 16 22 25   BILITOTAL 1.0 0.8 0.7   PROTTOTAL 7.0 7.9 7.4   ALBUMIN 2.9* 3.4 3.2*      PANC  Recent Labs  Lab 03/30/18 1957 03/29/18  0842   LIPASE 9697* 323   AMYLASE  --  56       Imaging:  CT A/P  IMPRESSION:  1. No free intraperitoneal gas to suggest perforation and no discrete  or drainable fluid collection as questioned.   2a. Significant increase in  peripancreatic inflammation, as well as  significant intra-abdominal fluid centered about the pancreas,  consistent with severe pancreatitis. No convincing evidence of  pancreatic necrosis, and no defined peripancreatic fluid collection.   New pancreatic duct stent is in place, extending into the duodenum.  2b. Stable appearance of hypodense cystic lesion at the distal  pancreatic remnant. Associated coarse calcifications. There is a small  focus of gas within this collection suggesting a communication with  the main pancreatic duct. Given these findings, this likely represents  a side branch type IPMN. Pancreatitis sequela is also in the  differential.  3. New biliary stent is in place and there is associated pneumobilia.  4. Hyperdense material layering in the stomach does not change from  portal venous phase to delayed images, making GI bleed unlikely.  5. Hyperenhancing gallbladder and common bile duct mucosa, likely  secondary to surrounding inflammation and recent procedure.    Seen and examined with GI fellow, agree with findings and recommendations.    Yovanny Beasley MD GI Staff

## 2018-03-31 NOTE — PHARMACY-VANCOMYCIN DOSING SERVICE
Pharmacy Vancomycin Initial Note  Date of Service 2018  Patient's  1945  72 year old, male    Indication: Healthcare-Associated Pneumonia    Current estimated CrCl = Estimated Creatinine Clearance: 78.5 mL/min (based on Cr of 0.99).    Creatinine for last 3 days  3/29/2018:  8:42 AM Creatinine 0.86 mg/dL  3/30/2018:  7:57 PM Creatinine 1.29 mg/dL  3/31/2018:  1:40 AM Creatinine 0.99 mg/dL    Recent Vancomycin Level(s) for last 3 days  No results found for requested labs within last 72 hours.      Vancomycin IV Administrations (past 72 hours)      No vancomycin orders with administrations in past 72 hours.                Nephrotoxins and other renal medications (Future)    Start     Dose/Rate Route Frequency Ordered Stop    18 0600  piperacillin-tazobactam (ZOSYN) 3.375 g vial to attach to  mL bag      3.375 g  over 30 Minutes Intravenous EVERY 8 HOURS SCHEDULED 18 0217      18 0530  vancomycin (VANCOCIN) 1,750 mg in sodium chloride 0.9 % 500 mL intermittent infusion      1,750 mg  over 2 Hours Intravenous EVERY 12 HOURS 18 0441            Contrast Orders - past 72 hours (72h ago through future)    Start     Dose/Rate Route Frequency Ordered Stop    18 210  iopamidol (ISOVUE-370) solution 123 mL      123 mL Intravenous ONCE 18          Plan:  1.  Start vancomycin 1750 mg IV q12h.   2.  Goal Trough Level: 15-20 mg/L   3.  Pharmacy will check trough levels as appropriate in 1-3 Days.    4. Serum creatinine levels will be ordered daily for the first week of therapy and at least twice weekly for subsequent weeks.    5. Taylor method utilized to dose vancomycin therapy: Method 2    Brianna Valerio, PharmD, BCPS

## 2018-03-31 NOTE — CONSULTS
General Surgery Consult    Antoine Russo MRN# [id number]     Date of Admission: 3/30/2018    CC: Acute pancreatitis with lactic acidosis, concern for bowel ischmeia    Assessment: 72 year old male with PMH significant for CAD, chronic Afib on warfarin, GERD, hx of IPMN s/p distal pancreatectomy and splenectomy, GERD, sigmoid colectomy for diverticulitis, HLD, HTN, BPH, PVD and DM2 now presents with abdominal pain s/p ERCP and stent on 3/29 now presenting on 3/30 with severe abdominal pain consistent with pancreatitis. CT with peripancreatic inflammation and edema. No evidence of bowel obstruction or ileus. No pneumatosis or evidence of bowel ischemia. Lipase from 323 to 9697, LFTs and ALP otherwise WNL. Lactate now up to 14.7 after 3L LR, now trending down (2.6 last check). Stays HDS    Plan:   - No acute surgical indication at this time  - Elevated lactate likely due to under-resuscitation, now responding to resucitation. No evidence of bowel ischemia at this time.  - Agree with GI consult for management of severe pancreatitis  - Surgery will sign off    Discussed with staff, Dr. Castro    HPI: 72 year old male with PMH significant for CAD, chronic Afib on warfarin, GERD, hx of IPMN s/p distal pancreatectomy and splenectomy, Martel's and take down for diverticulitis, GERD, HLD, HTN, BPH, PVD and DM2 now presents with epigastric abdominal pain shooting through the back s/p ERCP and stent on 3/29. Started earlier on 3/30 after eating tacos. + subjective chills. + Nausea and emesis x4. Bowel habits normal, last BM in the morning without blood or melena. Workup in ED with pancreatitis, so far have received 2L LR in ED and 1L in MICU and 4th liter running. Lactate trending up to 14.7, surgery was consulted for possible bowel ischemia. Overnight his lactic acid has trended down to 2.6. CT scan shows severe acute pancreatitis but no evidence of bowel compromise ( no free air, pneumatosis or portal venous gas).  This AM he reports mild abdominal pain but is alert , oriented and is hemodynamically stable     Past Medical History:  Past Medical History:   Diagnosis Date     C. 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     Noninfectious ileitis        Past Surgical History:  Past Surgical History:   Procedure Laterality Date     BREAST SURGERY  2008    right breast mass benign     COLONOSCOPY      multiple polyps removed     COLONOSCOPY  8/24/2011    Procedure:COMBINED COLONOSCOPY, REMOVE TUMOR/POLYP/LESION BY SNARE; Surgeon:MILEY ARBOLEDA; Location:[facility]     COLONOSCOPY  12/17/2012    Procedure: COLONOSCOPY;;  Surgeon: Leon Maurer MD;  Location: [facility]     COLONOSCOPY  12/18/2012    Procedure: COLONOSCOPY;;  Surgeon: Leon Maurer MD;  Location: [facility]     DENERVATION OF SPERMATIC CORD MICROSURGICAL Left 5/23/2017    Procedure: DENERVATION OF SPERMATIC CORD MICROSURGICAL;;  Surgeon: Marcio Aggarwal MD;  [facility]     DISSECTION RADICAL NECK BILATERAL  8/2/2012    Procedure: DISSECTION RADICAL NECK BILATERAL;;  Surgeon: Yung Alvares MD;  [facility]     ENDOSCOPIC RETROGRADE CHOLANGIOPANCREATOGRAM N/A 5/10/2016    Procedure: COMBINED ENDOSCOPIC RETROGRADE CHOLANGIOPANCREATOGRAPHY, PLACE TUBE/STENT;  Surgeon: Yovanny Beasley MD;  [facility]     ENDOSCOPIC RETROGRADE CHOLANGIOPANCREATOGRAM N/A 3/29/2018    Procedure: ENDOSCOPIC RETROGRADE CHOLANGIOPANCREATOGRAM;  Endoscopic Retrograde Cholangiopancreatogram, Endoscopic Ultrasound, Biliary Sphincterotomy, Biliary and Pancreatic Stent Placement;  Surgeon: Yovanny Beasley MD;  [facility]     ENDOSCOPIC ULTRASOUND UPPER GASTROINTESTINAL TRACT (GI) N/A 2/3/2016    Procedure: ENDOSCOPIC ULTRASOUND, ESOPHAGOSCOPY / UPPER GASTROINTESTINAL TRACT (GI);  Surgeon:  Grabiel Plata MD;  [facility]     ENDOSCOPIC ULTRASOUND UPPER GASTROINTESTINAL TRACT (GI) N/A 3/29/2018    Procedure: ENDOSCOPIC ULTRASOUND, ESOPHAGOSCOPY / UPPER GASTROINTESTINAL TRACT (GI);;  Surgeon: Grabiel Plata MD;  [facility]     ESOPHAGOSCOPY, GASTROSCOPY, DUODENOSCOPY (EGD), COMBINED N/A 2/3/2016    Procedure: COMBINED ENDOSCOPIC ULTRASOUND, ESOPHAGOSCOPY, GASTROSCOPY, DUODENOSCOPY (EGD), FINE NEEDLE ASPIRATE/BIOPSY;  Surgeon: Grabiel Plata MD;  [facility]     ESOPHAGOSCOPY, GASTROSCOPY, DUODENOSCOPY (EGD), COMBINED N/A 6/8/2016    Procedure: COMBINED ESOPHAGOSCOPY, GASTROSCOPY, DUODENOSCOPY (EGD), REMOVE FOREIGN BODY;  Surgeon: Yovanny Beasley MD;  [facility]     EXCISE LESION INTRAORAL  6/14/2012    Procedure: EXCISE LESION INTRAORAL;  Wide Local Excision Floor of Mouth, Direct Laryngoscopy, Bilateral Ida's Marsuplization, Split Thickness Skin Graft from right Thigh  Latex Safe;  Surgeon: Gerson Ravi MD;  [facility]     EXCISE LESION INTRAORAL  8/2/2012    Procedure: EXCISE LESION INTRAORAL;  Floor of Mouth Resection, Bilateral Selective Radical Neck Dissection, Tracheostomy, Left Radial Forearm  Free Flap with Alloderm, Nasogastric Feeding Tube Placement,    * Latex Safe*;  Surgeon: Gerson Ravi MD;  [facility]     EXCISE LESION INTRAORAL  12/11/2012    Procedure: EXCISE LESION INTRAORAL;  takedown of oral flap;  Surgeon: Yung Alvares MD;  [facility]     GRAFT FREE VASCULARIZED (LOCATION)  8/2/2012    Procedure: GRAFT FREE VASCULARIZED (LOCATION);;  Surgeon: Yung Alvares MD;  [facility]     GRAFT SKIN SPLIT THICKNESS FROM EXTREMITY  6/14/2012    Procedure: GRAFT SKIN SPLIT THICKNESS FROM EXTREMITY;;  Surgeon: Gerson Ravi MD;  [facility]     LAPAROSCOPIC ILEOSTOMY TAKEDOWN  6/6/2013    Procedure: LAPAROSCOPIC ILEOSTOMY TAKEDOWN;  Laparoscopic Closure of Enterostomy, Guerda's Type with IleoRectal Anastomosis ;   Surgeon: Grabiel Riddle MD;  [facility]     LAPAROTOMY EXPLORATORY  12/20/2012    Procedure: LAPAROTOMY EXPLORATORY;  Exploratory Laparotomy, total abdominal colectomy, ileostomy formation;  Surgeon: Miquel Cannon MD;  [facility]     LARYNGOSCOPY  6/14/2012    Procedure: LARYNGOSCOPY;;  Surgeon: Gerson Ravi MD;  [facility]     ORTHOPEDIC SURGERY      ganglian cyst left ankle     PANCREATECTOMY, SPLENECTOMY N/A 3/10/2016    Procedure: PANCREATECTOMY, SPLENECTOMY;  Surgeon: Nael Abel MD;  [facility]     SHOULDER SURGERY  2006, 2008    2006- right rotator cuff, 2008 bone spur on left. Dr. Hdez     VARICOCELECTOMY Left 5/23/2017    Procedure: VARICOCELECTOMY;  Left Varicocele Repair, Denervation of Left Testis;  Surgeon: Marcio Aggarwal MD;  [facility]       Allergies:     Allergies   Allergen Reactions     Nkda [No Known Drug Allergies]        Medications:    No current facility-administered medications on file prior to encounter.   Current Outpatient Prescriptions on File Prior to Encounter:  warfarin (COUMADIN) 2.5 MG tablet 1.25 mg Tues/Sat and 2.5 mg all other days or As directed by Anticoagulation Clinic   metoprolol succinate (TOPROL-XL) 50 MG 24 hr tablet Take 1 tablet (50 mg) by mouth 2 times daily   gabapentin (NEURONTIN) 300 MG capsule Take 2 tablest (600 mg) every night   metFORMIN (GLUCOPHAGE-XR) 500 MG 24 hr tablet Take 2 tablets (1,000 mg) by mouth 2 times daily (with meals)   tamsulosin (FLOMAX) 0.4 MG capsule Take 1 capsule (0.4 mg) by mouth daily   lisinopril (PRINIVIL/ZESTRIL) 10 MG tablet Take 1 tablet (10 mg) by mouth daily   glipiZIDE (GLIPIZIDE XL) 2.5 MG 24 hr tablet Take 1 tablet (2.5 mg) by mouth every morning   multivitamin, therapeutic with minerals (THERA-VIT-M) TABS Take 1 tablet by mouth daily. (Patient taking differently: Take 1 tablet by mouth every morning )       Social History:  Social History     Social History     Marital status:  "     Spouse name: N/A     Number of children: N/A     Years of education: N/A     Occupational History     [employer]      20 hr/week monday-Thur 7-noon     [employer]     RETIRED      Social History Main Topics     Smoking status: Former Smoker     Packs/day: 1.00     Years: 40.00     Types: Cigarettes     Quit date: 2006     Smokeless tobacco: Never Used     Alcohol use No     Drug use: No     Sexual activity: Yes     Partners: Female     Other Topics Concern      Service Yes     [employer] 6 years     Blood Transfusions No     Caffeine Concern Yes     0-2 cups     Occupational Exposure No     retired     Hobby Hazards No     Sleep Concern No     Stress Concern No     Weight Concern No     Special Diet Yes     healthy     Back Care No     Exercise Yes     Bike Helmet Not Asked     N/A     Seat Belt Yes     Self-Exams Yes     Parent/Sibling W/ Cabg, Mi Or Angioplasty Before 65f 55m? No     Social History Narrative    Son lives in [place] with 2 grandchildren 19 and 6       Family History:  Family History   Problem Relation Age of Onset     DIABETES Sister      onset age 50     Alzheimer Disease Mother       80     Alzheimer Disease Father       85     DIABETES Other      nephew type 1     DIABETES Other      Anesthesia Reaction No family hx of      Colon Cancer No family hx of      Colon Polyps No family hx of      Crohn Disease No family hx of      Ulcerative Colitis No family hx of        ROS:  Otherwise negative    Exam:  /83  Pulse 117  Temp 98.5  F (36.9  C) (Oral)  Resp 22  Ht 1.778 m (5' 10\")  Wt 90.3 kg (199 lb 1.2 oz)  SpO2 97%  BMI 28.56 kg/m2  General: Alert, interactive, NAD  Resp: NLB on 6L NC  Cardiac: tachy around 110s, irregular  Abdomen: Soft, minor distension, tender to palpation in epigastric region. No diffuse peritonitis. Minimal tenderness in other areas.  Extremities: No LE edema or obvious joint " abnormalities  Skin: Warm and dry, no jaundice or rash    Labs:   BMP  Recent Labs  Lab 03/31/18  0140 03/30/18 1957 03/29/18  0842   NA  --  140 136   POTASSIUM  --  4.6 4.0   CHLORIDE  --  102 104   CO2  --  27 23   BUN  --  25 14   CR 0.99 1.29* 0.86   GLC  --  261* 185*     CBC  Recent Labs  Lab 03/31/18  0416 03/31/18  0140 03/30/18 1957 03/29/18  0842   WBC 19.9*  --  29.2* 11.6*   HGB 15.4  --  15.8 13.3    214 249 220     LFT  Recent Labs  Lab 03/30/18 1957 03/29/18  1418 03/29/18  0842   AST 21  --  12   ALT 22  --  25   ALKPHOS 48  --  49   BILITOTAL 0.8  --  0.7   ALBUMIN 3.4  --  3.2*   INR  --  1.10  --        Recent Labs  Lab 03/31/18  0139 03/30/18 1957 03/29/18  1609 03/29/18  0842 03/29/18  0759   GLC  --  261*  --  185*  --    *  --  187*  --  192*       Imaging:   Recent Results (from the past 24 hour(s))   CT Abdomen Pelvis w Contrast   Result Value    Radiologist flags Possible GI bleed (Urgent)    Impression    IMPRESSION:   1.  Significant increase in peripancreatic inflammation and edema,  consistent with pancreatitis.  2.  Pneumobilia and air in the fluid collection at the tail of the  pancreas, likely secondary to pancreatic duct and biliary stents.  3.  Hyperdense material layering in the stomach does not change from  portal venous phase to delayed images, making GI bleed unlikely.  4.  Hyperenhancing gallbladder mucosa.          Sanchez Moreno MD  PGY-2 General Surgery Resident  [id number]

## 2018-03-31 NOTE — ED NOTES
Pt presents ambulatory to triage with c/o severe abdominal pain this morning after attempting to eat. States he ate something he shouldn't have then has been nauseous and vomiting since. Pt reports he had an ERCP done yesterday. Pt currently vomiting in triage.

## 2018-03-31 NOTE — PROGRESS NOTES
Update.    Called to the bedside around 3 AM for pt shivering and with HR in the 180s.     HR in narrow complex regular appearing rhythm. BP fluctuating between 130-180s systolic. Afebrile. Some mottling change noted on the anterior thighs. Limbs otherwise warm. Mental status somewhat more lethergic but able to participate in questioning.     Adenosine 6 mg then 12 mg given without change to HR. Pt given 5 IV metoprolol with improvement in HR to 120-130s.     Labs drawn during that time notable for LA up to 14.7. ABG 7.29/31/115. 2 additional L LR ordered. Case discussed with GI who recommend getting abdominal xray, continuing fluids and antibiotics. They will assess the pt early AM. Re-evaluated the pt around 4:30 and he reports feeling better though pain still present.

## 2018-03-31 NOTE — ED PROVIDER NOTES
History     Chief Complaint   Patient presents with     Abdominal Pain     HPI  Antoine Russo is a 72 year old male with a history of CAD, chronic A. fib, pancreatic cancer status post distal pancreatectomy and splenectomy, diverticulitis status post partial colectomy, DM type II, and hypertension who presents to the Emergency Department with his wife for evaluation of abdominal pain, nausea, and vomiting.  The patient underwent ERCP with biliary and pancreatic stent placement yesterday with Dr. Beasley for pancreatic tail leak.  He reports yesterday he felt fine after discharge, but after eating breakfast this morning experienced onset of periumbilical pain, nausea, and vomiting.  He states he saw his PCP today at which time he felt fine, but after he returned home and ate again he experienced severe abdominal pain with worsening vomiting.  He took Tylenol without relief.  He states this is not like his previous pancreatitis pain.  His last bowel movement was this morning.    Past Medical History:   Diagnosis Date     C. 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     Noninfectious ileitis        Past Surgical History:   Procedure Laterality Date     BREAST SURGERY  2008    right breast mass benign     COLONOSCOPY      multiple polyps removed     COLONOSCOPY  8/24/2011    Procedure:COMBINED COLONOSCOPY, REMOVE TUMOR/POLYP/LESION BY SNARE; Surgeon:MILEY ARBOLEDA; Location:[facility]     COLONOSCOPY  12/17/2012    Procedure: COLONOSCOPY;;  Surgeon: Leon Maurer MD;  Location:  GI     COLONOSCOPY  12/18/2012    Procedure: COLONOSCOPY;;  Surgeon: Leon Maurer MD;  Location:  GI     DENERVATION OF SPERMATIC CORD MICROSURGICAL Left 5/23/2017    Procedure: DENERVATION OF SPERMATIC CORD MICROSURGICAL;;  Surgeon: Marcio Aggarwal MD;   [facility]     DISSECTION RADICAL NECK BILATERAL  8/2/2012    Procedure: DISSECTION RADICAL NECK BILATERAL;;  Surgeon: Yung Alvares MD;  [facility]: [facility]     ENDOSCOPIC RETROGRADE CHOLANGIOPANCREATOGRAM N/A 5/10/2016    Procedure: COMBINED ENDOSCOPIC RETROGRADE CHOLANGIOPANCREATOGRAPHY, PLACE TUBE/STENT;  Surgeon: Yovanny Beasley MD;  [facility]     ENDOSCOPIC RETROGRADE CHOLANGIOPANCREATOGRAM N/A 3/29/2018    Procedure: ENDOSCOPIC RETROGRADE CHOLANGIOPANCREATOGRAM;  Endoscopic Retrograde Cholangiopancreatogram, Endoscopic Ultrasound, Biliary Sphincterotomy, Biliary and Pancreatic Stent Placement;  Surgeon: Yovanny Beasley MD;  [facility]     ENDOSCOPIC ULTRASOUND UPPER GASTROINTESTINAL TRACT (GI) N/A 2/3/2016    Procedure: ENDOSCOPIC ULTRASOUND, ESOPHAGOSCOPY / UPPER GASTROINTESTINAL TRACT (GI);  Surgeon: Grabiel Plata MD;  [facility]     ENDOSCOPIC ULTRASOUND UPPER GASTROINTESTINAL TRACT (GI) N/A 3/29/2018    Procedure: ENDOSCOPIC ULTRASOUND, ESOPHAGOSCOPY / UPPER GASTROINTESTINAL TRACT (GI);;  Surgeon: Grabiel Plata MD;  [facility]     ESOPHAGOSCOPY, GASTROSCOPY, DUODENOSCOPY (EGD), COMBINED N/A 2/3/2016    Procedure: COMBINED ENDOSCOPIC ULTRASOUND, ESOPHAGOSCOPY, GASTROSCOPY, DUODENOSCOPY (EGD), FINE NEEDLE ASPIRATE/BIOPSY;  Surgeon: Grabiel Plata MD;  [facility]     ESOPHAGOSCOPY, GASTROSCOPY, DUODENOSCOPY (EGD), COMBINED N/A 6/8/2016    Procedure: COMBINED ESOPHAGOSCOPY, GASTROSCOPY, DUODENOSCOPY (EGD), REMOVE FOREIGN BODY;  Surgeon: Yovanny Beasley MD;  [facility]     EXCISE LESION INTRAORAL  6/14/2012    Procedure: EXCISE LESION INTRAORAL;  Wide Local Excision Floor of Mouth, Direct Laryngoscopy, Bilateral Ida's Marsuplization, Split Thickness Skin Graft from right Thigh  Latex Safe;  Surgeon: Gerson Ravi MD;  [facility]     EXCISE LESION INTRAORAL  8/2/2012    Procedure: EXCISE LESION INTRAORAL;  Floor of Mouth  Resection, Bilateral Selective Radical Neck Dissection, Tracheostomy, Left Radial Forearm  Free Flap with Alloderm, Nasogastric Feeding Tube Placement,    * Latex Safe*;  Surgeon: Gerson Ravi MD;  [facility]     EXCISE LESION INTRAORAL  2012    Procedure: EXCISE LESION INTRAORAL;  takedown of oral flap;  Surgeon: Yung Alvares MD;  [facility]     GRAFT FREE VASCULARIZED (LOCATION)  2012    Procedure: GRAFT FREE VASCULARIZED (LOCATION);;  Surgeon: Yung Alvares MD;  [facility]     GRAFT SKIN SPLIT THICKNESS FROM EXTREMITY  2012    Procedure: GRAFT SKIN SPLIT THICKNESS FROM EXTREMITY;;  Surgeon: Gerson Ravi MD;  [facility]     LAPAROSCOPIC ILEOSTOMY TAKEDOWN  2013    Procedure: LAPAROSCOPIC ILEOSTOMY TAKEDOWN;  Laparoscopic Closure of Enterostomy, Guerda's Type with IleoRectal Anastomosis ;  Surgeon: Grabiel Riddle MD;  [facility]     LAPAROTOMY EXPLORATORY  2012    Procedure: LAPAROTOMY EXPLORATORY;  Exploratory Laparotomy, total abdominal colectomy, ileostomy formation;  Surgeon: Mqiuel Cannon MD;  [facility]     LARYNGOSCOPY  2012    Procedure: LARYNGOSCOPY;;  Surgeon: Gerson Ravi MD;  [facility]     ORTHOPEDIC SURGERY      ganglian cyst left ankle     PANCREATECTOMY, SPLENECTOMY N/A 3/10/2016    Procedure: PANCREATECTOMY, SPLENECTOMY;  Surgeon: Nael Abel MD;  [facility]     SHOULDER SURGERY  ,     2006- right rotator cuff, 2008 bone spur on left. Dr. Hdez     VARICOCELECTOMY Left 2017    Procedure: VARICOCELECTOMY;  Left Varicocele Repair, Denervation of Left Testis;  Surgeon: Marcio Aggarwal MD;  [facility]       Family History   Problem Relation Age of Onset     DIABETES Sister      onset age 50     Alzheimer Disease Mother       80     Alzheimer Disease Father       85     DIABETES Other      nephew type 1     DIABETES Other      Anesthesia Reaction No family hx of      Colon  "Cancer No family hx of      Colon Polyps No family hx of      Crohn Disease No family hx of      Ulcerative Colitis No family hx of        Social History   Substance Use Topics     Smoking status: Former Smoker     Packs/day: 1.00     Years: 40.00     Types: Cigarettes     Quit date: 11/24/2006     Smokeless tobacco: Never Used     Alcohol use No       Current Facility-Administered Medications   Medication     lactated ringers infusion     0.9% sodium chloride BOLUS    Followed by     0.9% sodium chloride BOLUS    Followed by     sodium chloride 0.9% infusion     piperacillin-tazobactam (ZOSYN) 3.375 g vial to attach to  mL bag     HYDROmorphone (PF) (DILAUDID) injection 0.5 mg     Current Outpatient Prescriptions   Medication     warfarin (COUMADIN) 2.5 MG tablet     metoprolol succinate (TOPROL-XL) 50 MG 24 hr tablet     gabapentin (NEURONTIN) 300 MG capsule     metFORMIN (GLUCOPHAGE-XR) 500 MG 24 hr tablet     tamsulosin (FLOMAX) 0.4 MG capsule     lisinopril (PRINIVIL/ZESTRIL) 10 MG tablet     glipiZIDE (GLIPIZIDE XL) 2.5 MG 24 hr tablet     multivitamin, therapeutic with minerals (THERA-VIT-M) TABS        Allergies   Allergen Reactions     Nkda [No Known Drug Allergies]          I have reviewed the Medications, Allergies, Past Medical and Surgical History, and Social History in the Epic system.    Review of Systems   Constitutional: Negative for fever.   Respiratory: Negative for shortness of breath.    Cardiovascular: Negative for chest pain.   Gastrointestinal: Positive for abdominal pain, nausea and vomiting.   All other systems reviewed and are negative.      Physical Exam   BP: (!) 152/91  Pulse: 117  Heart Rate: 110  Temp: 97.7  F (36.5  C)  Resp: 16  Height: 177.8 cm (5' 10\")  Weight: 90.7 kg (200 lb)  SpO2: 94 %      Physical Exam Exam:  Constitutional: healthy, alert and no distress  Head: Normocephalic. No masses, lesions, tenderness or abnormalities  Neck: Neck supple. No adenopathy. Thyroid " symmetric, normal size,, Carotids without bruits.  ENT: ENT exam normal, no neck nodes or sinus tenderness  Cardiovascular: negative, PMI normal. No lifts, heaves, or thrills. RRR. No murmurs, clicks gallops or rub  Respiratory: negative, Percussion normal. Good diaphragmatic excursion. Lungs clear  Gastrointestinal: Abdomen epigastric tenderness otherwise BS normal. No masses, organomegaly  : Deferred  Musculoskeletal: extremities normal- no gross deformities noted, gait normal and normal muscle tone  Skin: no suspicious lesions or rashes  Neurologic: Gait normal. Reflexes normal and symmetric. Sensation grossly WNL.  Psychiatric: mentation appears normal and affect normal/bright  Hematologic/Lymphatic/Immunologic: Normal cervical lymph nodes      ED Course     ED Course     Procedures        CT Abdomen Pelvis w Contrast (Preliminary result)    Abnormal Component (Lab Inquiry)       Collection Time Result Time RADFLAG      03/30/18 22:59:59 Possible GI bleed (Urgent)                 Preliminary result by Baldo Edmonds MD (03/30/18 22:59:59)     Impression:     IMPRESSION:   1.  Significant increase in peripancreatic inflammation and edema,  consistent with pancreatitis.  2.  Pneumobilia and air in the fluid collection at the patella  pancreas, likely pancreatic duct and biliary stents.  3.  Hyperdense material layering in the stomach does not change from  portal venous phase to delayed images, making GI bleed unlikely.  4.  Hyperenhancing gallbladder mucosa.          Narrative:               Preliminary result by Baldo Edmonds MD (03/30/18 22:28:07)     Impression:     IMPRESSION:   1.  Significant increase in peripancreatic inflammation and edema,  consistent with pancreatitis.  2.  Pneumobilia and air in the fluid collection at the patella  pancreas, likely pancreatic duct and biliary stents.  3.  Hyperdense material layering in the stomach with pooling of  contrast. These findings could  represent acute GI bleed. If concern  for GI bleed, can repeat with noncontrast CT immediately to evaluate  for extravasation.  4.  Hyperenhancing gallbladder mucosa.    [Urgent Result: Possible GI bleed]    Finding was identified on 3/30/2018 10:05 PM.     Dr. Serra was contacted by Dr. Edmonds at 3/30/2018 10:27 PM and  verbalized understanding of the urgent finding.         Narrative:     EXAMINATION: CT abdomen and pelvis with contrast, 3/30/2018 10:05 PM    TECHNIQUE:  Helical CT images from the lung bases through the  symphysis pubis were obtained with contrast.  Coronal reformatted  images were generated at a workstation for further assessment.    CONTRAST:  123 cc Isovue 370 IV.    COMPARISON: 2/25/2018.    HISTORY: Status post ERCP yesterday, now vomiting and pain increase,  perforation versus abscess?    FINDINGS:    Abdomen and pelvis:   Liver: Pneumobilia. No suspicious liver lesions. Portal veins appear  patent.  Gallbladder: Hyper enhancing gallbladder mucosa. Air at the neck of  the gallbladder and common bile duct, likely postprocedural. Biliary  and pancreatic ducts in place with tips in the duodenum.  Spleen: Splenectomy.  Pancreas: Postsurgical changes of distal pancreatectomy. Unchanged  cystic structure at the distal end of the pancreas containing a small  focus of air, likely representing communication with the pancreatic  duct. Significantly increased peripancreatic formation.  Adrenal glands: No adrenal nodules.  Kidneys: Unchanged numerous bilateral simple cysts. No kidney masses.  No hydronephrosis or obstructing renal stones.  Bladder / Pelvic organs: Unremarkable.  Bowel: Postsurgical changes of subtotal colectomy with reanastomosis  of the sigmoid to the small bowel. No evidence of obstruction.  Diverticulum extending off the distal duodenum, proximal jejunum  (series 5 image 212).   Lymph nodes: No retroperitoneal, mesenteric, or pelvic  lymphadenopathy.  Peritoneum: No free air.  Significantly increased fluid in the upper  abdomen surrounding the gallbladder and pancreas.  Vessels: No infrarenal aortic aneurysm.     Lung bases: No consolidation or pleural effusion. Mild bronchiectasis  in the lower lobes. Lower lobe atelectasis.    Bones and soft tissues: No suspicious osseous lesions. Degenerative  changes of the lumbar spine with moderate spinal canal narrowing at  L3-4 secondary to disc osteophyte.                  Labs Ordered and Resulted from Time of ED Arrival Up to the Time of Departure from the ED   CBC WITH PLATELETS DIFFERENTIAL - Abnormal; Notable for the following:        Result Value    WBC 29.2 (*)     RDW 15.1 (*)     Absolute Neutrophil 26.5 (*)     All other components within normal limits   COMPREHENSIVE METABOLIC PANEL - Abnormal; Notable for the following:     Glucose 261 (*)     Creatinine 1.29 (*)     GFR Estimate 55 (*)     All other components within normal limits   LIPASE - Abnormal; Notable for the following:     Lipase 9697 (*)     All other components within normal limits   LACTIC ACID WHOLE BLOOD - Abnormal; Notable for the following:     Lactic Acid 4.7 (*)     All other components within normal limits   LACTIC ACID WHOLE BLOOD - Abnormal; Notable for the following:     Lactic Acid 4.8 (*)     All other components within normal limits   TROPONIN I   ROUTINE UA WITH MICROSCOPIC   BLOOD CULTURE   BLOOD CULTURE   URINE CULTURE AEROBIC BACTERIAL            Assessments & Plan (with Medical Decision Making)   This is a 72-year-old male who is here with epigastric abdominal tenderness and pain.  Patient states that he had undergone ERCP yesterday secondary to possible leak.  He had stents placed ×2 according to the note and was discharged home.  Patient had been doing well until sometime midmorning when he ate something after his primary care doctor's visit.  Since then patient's been having increasing abdominal pain with nausea and vomiting.  Patient denies any  diarrhea.  Patient states the pain is continuing to get worse.  No fevers or chills.  Patient states that this is unlike his pancreatitis pain.  Physical exam shows diffuse tenderness but worse in epigastric area.  Given his history and current findings, and concern for possible perforation versus pancreatic leak versus abscess formation.  Patient will undergo CT of the abdomen as well as some basic labs including urinalysis.    Patient was noted to have elevated lactate as well as elevated lipase.  White count is also noted to be 29.  With these findings and CT results showing patric-pancreatic inflammation consistent with pancreatitis, I am concerned for acute inflammation suggestive of pancreatitis.  Patient was given 2 L of lactated Ringer's and lactate was rechecked which did not improve and patient would be admitted to ICU.  I have spoken with the ICU attending who agrees with the plan.  Patient at this time also have blood cultures drawn and then will be started on Zosyn.  I have spoken with the patient at length regarding her findings and agrees with the  plan of admission.    I have reviewed the nursing notes.    I have reviewed the findings, diagnosis, plan and need for follow up with the patient.    New Prescriptions    No medications on file       Final diagnoses:   Pancreatitis, recurrent (H)   Bacterial sepsis (H)   IRich, am serving as a trained medical scribe to document services personally performed by Guevara Serra MD, based on the provider's statements to me.      Guevara KILPATRICK MD, was physically present and have reviewed and verified the accuracy of this note documented by Rich Ayala.       3/30/2018   [facility], EMERGENCY DEPARTMENT     Guevara Serra MD  03/31/18 0000

## 2018-04-01 ENCOUNTER — APPOINTMENT (OUTPATIENT)
Dept: PHYSICAL THERAPY | Facility: CLINIC | Age: 73
End: 2018-04-01
Attending: INTERNAL MEDICINE
Payer: COMMERCIAL

## 2018-04-01 LAB
ABO + RH BLD: NORMAL
ABO + RH BLD: NORMAL
ALBUMIN SERPL-MCNC: 2.1 G/DL (ref 3.4–5)
ALP SERPL-CCNC: 40 U/L (ref 40–150)
ALT SERPL W P-5'-P-CCNC: 13 U/L (ref 0–70)
ANION GAP SERPL CALCULATED.3IONS-SCNC: 6 MMOL/L (ref 3–14)
AST SERPL W P-5'-P-CCNC: 14 U/L (ref 0–45)
BACTERIA SPEC CULT: ABNORMAL
BILIRUB SERPL-MCNC: 3 MG/DL (ref 0.2–1.3)
BLD GP AB SCN SERPL QL: NORMAL
BLOOD BANK CMNT PATIENT-IMP: NORMAL
BUN SERPL-MCNC: 13 MG/DL (ref 7–30)
CA-I BLD-MCNC: 4.6 MG/DL (ref 4.4–5.2)
CALCIUM SERPL-MCNC: 8.6 MG/DL (ref 8.5–10.1)
CHLORIDE SERPL-SCNC: 106 MMOL/L (ref 94–109)
CO2 SERPL-SCNC: 27 MMOL/L (ref 20–32)
CREAT SERPL-MCNC: 0.86 MG/DL (ref 0.66–1.25)
ERYTHROCYTE [DISTWIDTH] IN BLOOD BY AUTOMATED COUNT: 15.8 % (ref 10–15)
GFR SERPL CREATININE-BSD FRML MDRD: 87 ML/MIN/1.7M2
GLUCOSE BLDC GLUCOMTR-MCNC: 150 MG/DL (ref 70–99)
GLUCOSE BLDC GLUCOMTR-MCNC: 158 MG/DL (ref 70–99)
GLUCOSE BLDC GLUCOMTR-MCNC: 158 MG/DL (ref 70–99)
GLUCOSE BLDC GLUCOMTR-MCNC: 176 MG/DL (ref 70–99)
GLUCOSE BLDC GLUCOMTR-MCNC: 177 MG/DL (ref 70–99)
GLUCOSE SERPL-MCNC: 166 MG/DL (ref 70–99)
HCT VFR BLD AUTO: 41.4 % (ref 40–53)
HGB BLD-MCNC: 13.1 G/DL (ref 13.3–17.7)
LACTATE BLD-SCNC: 1.7 MMOL/L (ref 0.4–1.9)
LIPASE SERPL-CCNC: 3108 U/L (ref 73–393)
Lab: ABNORMAL
MAGNESIUM SERPL-MCNC: 1.6 MG/DL (ref 1.6–2.3)
MAGNESIUM SERPL-MCNC: 1.7 MG/DL (ref 1.6–2.3)
MCH RBC QN AUTO: 32.3 PG (ref 26.5–33)
MCHC RBC AUTO-ENTMCNC: 31.6 G/DL (ref 31.5–36.5)
MCV RBC AUTO: 102 FL (ref 78–100)
PHOSPHATE SERPL-MCNC: 1.2 MG/DL (ref 2.5–4.5)
PHOSPHATE SERPL-MCNC: 1.8 MG/DL (ref 2.5–4.5)
PLATELET # BLD AUTO: 186 10E9/L (ref 150–450)
POTASSIUM SERPL-SCNC: 3.6 MMOL/L (ref 3.4–5.3)
POTASSIUM SERPL-SCNC: 4 MMOL/L (ref 3.4–5.3)
PROT SERPL-MCNC: 5.8 G/DL (ref 6.8–8.8)
RBC # BLD AUTO: 4.05 10E12/L (ref 4.4–5.9)
SODIUM SERPL-SCNC: 139 MMOL/L (ref 133–144)
SPECIMEN EXP DATE BLD: NORMAL
SPECIMEN SOURCE: ABNORMAL
WBC # BLD AUTO: 33.9 10E9/L (ref 4–11)

## 2018-04-01 PROCEDURE — 97530 THERAPEUTIC ACTIVITIES: CPT | Mod: GP

## 2018-04-01 PROCEDURE — 82330 ASSAY OF CALCIUM: CPT | Performed by: INTERNAL MEDICINE

## 2018-04-01 PROCEDURE — 84100 ASSAY OF PHOSPHORUS: CPT | Performed by: STUDENT IN AN ORGANIZED HEALTH CARE EDUCATION/TRAINING PROGRAM

## 2018-04-01 PROCEDURE — 36415 COLL VENOUS BLD VENIPUNCTURE: CPT | Performed by: STUDENT IN AN ORGANIZED HEALTH CARE EDUCATION/TRAINING PROGRAM

## 2018-04-01 PROCEDURE — 84132 ASSAY OF SERUM POTASSIUM: CPT | Performed by: STUDENT IN AN ORGANIZED HEALTH CARE EDUCATION/TRAINING PROGRAM

## 2018-04-01 PROCEDURE — 80053 COMPREHEN METABOLIC PANEL: CPT | Performed by: INTERNAL MEDICINE

## 2018-04-01 PROCEDURE — 25000128 H RX IP 250 OP 636: Performed by: STUDENT IN AN ORGANIZED HEALTH CARE EDUCATION/TRAINING PROGRAM

## 2018-04-01 PROCEDURE — 00000146 ZZHCL STATISTIC GLUCOSE BY METER IP

## 2018-04-01 PROCEDURE — 25000125 ZZHC RX 250: Performed by: STUDENT IN AN ORGANIZED HEALTH CARE EDUCATION/TRAINING PROGRAM

## 2018-04-01 PROCEDURE — 83690 ASSAY OF LIPASE: CPT | Performed by: INTERNAL MEDICINE

## 2018-04-01 PROCEDURE — 36415 COLL VENOUS BLD VENIPUNCTURE: CPT | Performed by: INTERNAL MEDICINE

## 2018-04-01 PROCEDURE — 12000001 ZZH R&B MED SURG/OB UMMC

## 2018-04-01 PROCEDURE — 85027 COMPLETE CBC AUTOMATED: CPT | Performed by: INTERNAL MEDICINE

## 2018-04-01 PROCEDURE — 86900 BLOOD TYPING SEROLOGIC ABO: CPT | Performed by: INTERNAL MEDICINE

## 2018-04-01 PROCEDURE — 25000132 ZZH RX MED GY IP 250 OP 250 PS 637: Performed by: INTERNAL MEDICINE

## 2018-04-01 PROCEDURE — 40000193 ZZH STATISTIC PT WARD VISIT

## 2018-04-01 PROCEDURE — 25000132 ZZH RX MED GY IP 250 OP 250 PS 637: Performed by: STUDENT IN AN ORGANIZED HEALTH CARE EDUCATION/TRAINING PROGRAM

## 2018-04-01 PROCEDURE — 83735 ASSAY OF MAGNESIUM: CPT | Performed by: INTERNAL MEDICINE

## 2018-04-01 PROCEDURE — 84100 ASSAY OF PHOSPHORUS: CPT | Performed by: INTERNAL MEDICINE

## 2018-04-01 PROCEDURE — 93005 ELECTROCARDIOGRAM TRACING: CPT

## 2018-04-01 PROCEDURE — 93010 ELECTROCARDIOGRAM REPORT: CPT | Performed by: INTERNAL MEDICINE

## 2018-04-01 PROCEDURE — 83735 ASSAY OF MAGNESIUM: CPT | Performed by: STUDENT IN AN ORGANIZED HEALTH CARE EDUCATION/TRAINING PROGRAM

## 2018-04-01 PROCEDURE — 99233 SBSQ HOSP IP/OBS HIGH 50: CPT | Mod: GC | Performed by: INTERNAL MEDICINE

## 2018-04-01 PROCEDURE — 86901 BLOOD TYPING SEROLOGIC RH(D): CPT | Performed by: INTERNAL MEDICINE

## 2018-04-01 PROCEDURE — 86850 RBC ANTIBODY SCREEN: CPT | Performed by: INTERNAL MEDICINE

## 2018-04-01 PROCEDURE — 83605 ASSAY OF LACTIC ACID: CPT | Performed by: INTERNAL MEDICINE

## 2018-04-01 PROCEDURE — 97161 PT EVAL LOW COMPLEX 20 MIN: CPT | Mod: GP

## 2018-04-01 RX ORDER — METOPROLOL TARTRATE 1 MG/ML
5 INJECTION, SOLUTION INTRAVENOUS ONCE
Status: DISCONTINUED | OUTPATIENT
Start: 2018-04-01 | End: 2018-04-01

## 2018-04-01 RX ORDER — POTASSIUM CHLORIDE 20MEQ/15ML
40 LIQUID (ML) ORAL ONCE
Status: COMPLETED | OUTPATIENT
Start: 2018-04-01 | End: 2018-04-01

## 2018-04-01 RX ORDER — FUROSEMIDE 10 MG/ML
20 INJECTION INTRAMUSCULAR; INTRAVENOUS ONCE
Status: COMPLETED | OUTPATIENT
Start: 2018-04-01 | End: 2018-04-01

## 2018-04-01 RX ORDER — METOPROLOL TARTRATE 1 MG/ML
5 INJECTION, SOLUTION INTRAVENOUS ONCE
Status: COMPLETED | OUTPATIENT
Start: 2018-04-01 | End: 2018-04-01

## 2018-04-01 RX ADMIN — METOPROLOL TARTRATE 5 MG: 5 INJECTION INTRAVENOUS at 19:46

## 2018-04-01 RX ADMIN — INSULIN ASPART 1 UNITS: 100 INJECTION, SOLUTION INTRAVENOUS; SUBCUTANEOUS at 14:22

## 2018-04-01 RX ADMIN — POTASSIUM PHOSPHATE, MONOBASIC AND POTASSIUM PHOSPHATE, DIBASIC 20 MMOL: 224; 236 INJECTION, SOLUTION INTRAVENOUS at 08:47

## 2018-04-01 RX ADMIN — METOPROLOL SUCCINATE 50 MG: 50 TABLET, EXTENDED RELEASE ORAL at 20:42

## 2018-04-01 RX ADMIN — POTASSIUM CHLORIDE 40 MEQ: 40 SOLUTION ORAL at 23:55

## 2018-04-01 RX ADMIN — GABAPENTIN 600 MG: 300 CAPSULE ORAL at 21:42

## 2018-04-01 RX ADMIN — PIPERACILLIN AND TAZOBACTAM 3.38 G: 3; .375 INJECTION, POWDER, LYOPHILIZED, FOR SOLUTION INTRAVENOUS; PARENTERAL at 08:48

## 2018-04-01 RX ADMIN — TAMSULOSIN HYDROCHLORIDE 0.4 MG: 0.4 CAPSULE ORAL at 08:47

## 2018-04-01 RX ADMIN — FUROSEMIDE 20 MG: 10 INJECTION, SOLUTION INTRAVENOUS at 21:42

## 2018-04-01 RX ADMIN — Medication 2 G: at 23:15

## 2018-04-01 RX ADMIN — LISINOPRIL 10 MG: 5 TABLET ORAL at 08:47

## 2018-04-01 RX ADMIN — INSULIN ASPART 1 UNITS: 100 INJECTION, SOLUTION INTRAVENOUS; SUBCUTANEOUS at 09:01

## 2018-04-01 RX ADMIN — PIPERACILLIN AND TAZOBACTAM 3.38 G: 3; .375 INJECTION, POWDER, LYOPHILIZED, FOR SOLUTION INTRAVENOUS; PARENTERAL at 14:22

## 2018-04-01 RX ADMIN — PIPERACILLIN AND TAZOBACTAM 3.38 G: 3; .375 INJECTION, POWDER, LYOPHILIZED, FOR SOLUTION INTRAVENOUS; PARENTERAL at 02:30

## 2018-04-01 RX ADMIN — INSULIN ASPART 1 UNITS: 100 INJECTION, SOLUTION INTRAVENOUS; SUBCUTANEOUS at 04:34

## 2018-04-01 RX ADMIN — SODIUM CHLORIDE, POTASSIUM CHLORIDE, SODIUM LACTATE AND CALCIUM CHLORIDE: 600; 310; 30; 20 INJECTION, SOLUTION INTRAVENOUS at 04:38

## 2018-04-01 RX ADMIN — METOPROLOL SUCCINATE 50 MG: 50 TABLET, EXTENDED RELEASE ORAL at 08:48

## 2018-04-01 RX ADMIN — PIPERACILLIN AND TAZOBACTAM 3.38 G: 3; .375 INJECTION, POWDER, LYOPHILIZED, FOR SOLUTION INTRAVENOUS; PARENTERAL at 20:50

## 2018-04-01 RX ADMIN — HYDROMORPHONE HYDROCHLORIDE 2 MG: 2 TABLET ORAL at 02:33

## 2018-04-01 ASSESSMENT — PAIN DESCRIPTION - DESCRIPTORS: DESCRIPTORS: ACHING;CRAMPING;DISCOMFORT

## 2018-04-01 NOTE — PROGRESS NOTES
MICU Progress Note    Antoine Russo ([id number]) admitted on 3/30/2018  04/01/2018     Assessment and Plans      Assessment and Plan  72 year old male with chronic EtOH pancreatitis, h/o IPMN s/p resection in 2016 with persistent panc tail leak who underwent ERCP with sphincterotomy and stent placed to common bile duct and pancreatic duct on 3/29 presented with acute pancreatitis.     Changes today  Transfer to medicine      Neurologic  # Pain: dilaudid PO 1-2 mg q3h  # Nausea: zofran PRN      # Diabetic neuropathy: Continue home gabapentin 600mg qhs      Cardiovascular  # CAD/PVD/HTN/HLD  # Paroxysmal atrial fibrillation  - Continue metoprolol 50 mg BID. Resumed lisinopril 10 mg qday since hemodynamically stable and MARGARET improved.  - Continue to hold warfarin for a total of 3 day post procedure, resume 4/2-      # Tachycardia  # HTN  Pt's BP is around 160/90. Not in shock. Tachycardia likely secondary to Afib and pancreatitis.  - Resumed home meds.     Respiratory  No active issues.       Gastrointestinal  # Pancreatitis  BiSAP score was 2. S/P 8L IVF with improvement in LA.   - GI following  - On clear liquids, advance diet as tolarated.  - LA down trended with fluid resuscitation.  - GI requested zosyn in the setting of post procedure.   - Follow-up plan post ERCP: hold warfarin and AC x 3 day post procedure. Abd xray in 3 weeks.      Infectious Disease  No clear infection  Leukocytosis, tachycardia, lactic acidosis, most likely secondary to acute pancreatitis. Pt got vancomycin and zosyn overnight, but d/c'd vancomycin.  - GI requested to start ABx, but will d/c zosyn if blood culture neg for 48 hours      Antibiotics:   Zosyn 3/29 - present      Cultures:  Blood cultures x 2 on 3/30 NGTD  Urine culture E.Coli, Asymptomatic      Hematology  No active issues.      Endocrine  # T2DM  Hold home metformin 1000mg BID, glipizide 2.5 mg qday  - BG check q4h  - On medium SSI  - Hypoglycemia  "protocol      Renal/Electolytes/FEN  # MARGARET, resolved. Baseline Cr: 0.7 - 0.8. Most likely pre-renal. Resolved with IVF.      VOLUME STATUS: Hypovolemic.   - On mIVF     Skin Care  No active issues. Monitor per nursing protocol.       PPX:   - VTE: None. Ambulate.  - GI: none indicated  FEN: iMVF, ADAT  Consults: GI/pancbili  Tubes/Lines/Drains: PIV x 1  CODE: Full  Family: Wife updated  Dispo: Transfer to medicine today     Seen and discussed with Dr. Pickens, who agrees with above assessment and plan.     Janette Bills MD PhD  PGY-1, Internal Medicine  Pager [phone]     Subjective     No acute event overnight. No abdominal pain, no nausea, no vomit.  4 pt ROS negative except above     Objective     Most recent vital signs:  /76  Pulse 117  Temp 98.5  F (36.9  C) (Oral)  Resp 20  Ht 1.778 m (5' 10\")  Wt 94.2 kg (207 lb 10.8 oz)  SpO2 94%  BMI 29.8 kg/m2  Temp:  [97.8  F (36.6  C)-99.1  F (37.3  C)] 98.5  F (36.9  C)  Heart Rate:  [] 93  Resp:  [16-20] 20  BP: (117-152)/(61-83) 134/76  SpO2:  [90 %-99 %] 94 %  Wt Readings from Last 2 Encounters:   04/01/18 94.2 kg (207 lb 10.8 oz)   03/30/18 90.7 kg (200 lb)       Intake/Output Summary (Last 24 hours) at 04/01/18 0642  Last data filed at 04/01/18 0600   Gross per 24 hour   Intake          3646.67 ml   Output             1625 ml   Net          2021.67 ml     Physical exam:  General: NAD  HEENT:  Supple   Cardiac: RRR. No m/r/g. Normal S1, S2.   Pulm: CTAB  Abd: +BS, soft, non distended, non tender  Skin: No new rash/petechiae  MSK: No new deformities, no edema  Neuro:  No new focal deficits    Investigations :  Reviewed and remarkable for:   Recent Results (from the past 48 hour(s))   CT Abdomen Pelvis w Contrast   Result Value    Radiologist flags Possible GI bleed (Urgent)    Narrative    EXAMINATION: CT abdomen and pelvis with contrast, 3/30/2018 10:05 PM    TECHNIQUE:  Helical CT images from the lung bases through the  symphysis pubis were " obtained with contrast.  Coronal reformatted  images were generated at a workstation for further assessment.    CONTRAST:  123 cc Isovue 370 IV.    COMPARISON: CT 2/25/2018.    HISTORY: Status post ERCP yesterday, now vomiting and pain increase,  perforation versus abscess?    FINDINGS:    Abdomen and pelvis:   Pancreas: New pancreatic ductal stent is in place. Postsurgical  changes of distal pancreatectomy. Unchanged cystic structure at the  distal pancreatic remnant measuring 1.8 cm and containing a small  focus of air, likely representing communication with the pancreatic  duct and relating to recent ERCP and/or stent placement. Significantly  increased peripancreatic inflammation and stranding. With increased  peripancreatic fluid. Fluid extends throughout the abdomen, tracking  inferiorly into the pelvis. There is no discrete or well-defined fluid  collection. Pancreatic parenchyma enhances uniformly without focal  defect to indicate necrosis.    Liver: New biliary stent is in place, and there is new hepatic biliary  ductal dilatation. Mild biliary thickening and enhancement presumably  relates to recent year CP. Mild pneumobilia, also likely related. No  suspicious liver lesions. Portal veins appear patent.  Gallbladder: Hyperenhancing gallbladder mucosa. Gas within the  gallbladder, likely postprocedural.  Spleen: Splenectomy.  Adrenal glands: No adrenal nodules.  Kidneys: Numerous unchanged simple renal cysts bilaterally. Unchanged  right renal cortical scarring. No kidney masses. No hydronephrosis or  obstructing renal stones. Areas of right and left renal cortical  scarring, most pronounced on the right. This may relate to prior  infection or ischemia.  Bladder / Pelvic organs: Unremarkable. Prostate is not enlarged.  Bowel: Postsurgical changes of subtotal colectomy with reanastomosis  of the sigmoid to the small bowel. No evidence of obstruction.  Duodenal diverticulum and proximal jejunal diverticulum  (series 5  image 212). Hyperdense material layering in the posterior stomach does  not change from portal venous phase to 10 minute delay. Thickening of  the gastric wall, especially pronounced at the body and antrum with  thickening of the proximal duodenum. This is likely reactive given  above pancreatic inflammation.  Lymph nodes: There are a few prominent peripancreatic lymph nodes  which are not technically enlarged, for example series 5, image 160  and series 5, image 134).  Peritoneum: No free air. Significantly increased fluid in the upper  abdomen surrounding the gallbladder and pancreas.  Vessels: No infrarenal aortic aneurysm. At least moderate aorta  biiliac atherosclerotic plaque deposition.    Lung bases: No consolidation or pleural effusion. Mild bronchiectasis  in the lower lobes. Lower lobe atelectasis.    Bones and soft tissues: No suspicious osseous lesions. Degenerative  changes of the lumbar spine with moderate spinal canal narrowing at  L3-4 secondary to disc osteophyte. Old left 10th and 11th rib  fractures.      Impression    IMPRESSION:  1. No free intraperitoneal gas to suggest perforation and no discrete  or drainable fluid collection as questioned.   2a. Significant increase in peripancreatic inflammation, as well as  significant intra-abdominal fluid centered about the pancreas,  consistent with severe pancreatitis. No convincing evidence of  pancreatic necrosis, and no defined peripancreatic fluid collection.   New pancreatic duct stent is in place, extending into the duodenum.  2b. Stable appearance of hypodense cystic lesion at the distal  pancreatic remnant. Associated coarse calcifications. There is a small  focus of gas within this collection suggesting a communication with  the main pancreatic duct. Given these findings, this likely represents  a side branch type IPMN. Pancreatitis sequela is also in the  differential.  3. New biliary stent is in place and there is associated  pneumobilia.  4. Hyperdense material layering in the stomach does not change from  portal venous phase to delayed images, making GI bleed unlikely.  5. Hyperenhancing gallbladder and common bile duct mucosa, likely  secondary to surrounding inflammation and recent procedure.    I have personally reviewed the examination and initial interpretation  and I agree with the findings.    SARAH MICHELE MD   XR Abdomen 1 View    Narrative    Abdominal radiograph one view  3/31/2018 6:41 AM      HISTORY: Upright, evaluate for perforation    COMPARISON: CT earlier today    FINDINGS: Single upright view of the abdomen was obtained.  Nonobstructive bowel gas pattern. No free air. Pneumobilia, as seen on  3/30/2018 CT. Biliary and pancreatic stents are in place. Multiple  surgical clips projecting over the left upper abdomen.      Impression    IMPRESSION: No evidence of free air.    I have personally reviewed the examination and initial interpretation  and I agree with the findings.    JENSEN HASSAN MD   XR Chest Port 1 View    Narrative    EXAM: XR CHEST PORT 1 VW  3/31/2018 8:14 AM     HISTORY:  pancreatitis    COMPARISON: Chest radiograph dated 4/19/2016, CT chest 7/31/2017    FINDINGS: A single AP view of the chest is obtained.     The cardiomediastinal silhouette is within normal limits. The  costophrenic angles project off this image. No pneumothorax. Small  left pleural effusion. Linear opacities of the left lung base slightly  increased from previous radiograph. Normal lung volumes.  Atherosclerotic calcifications of the aortic arch.    The visualized upper abdomen is unremarkable. No acute osseous  abnormality.      Impression    IMPRESSION:   1. Linear opacities the left lung base are favored to represent  atelectasis over infection.  2. Small left pleural effusion.    I have personally reviewed the examination and initial interpretation  and I agree with the findings.    SURYA FRIED MD       [facility]    Recent Labs  Lab  04/01/18  0617 03/31/18  1401 03/31/18  0416 03/31/18  0140 03/30/18 1957 03/29/18  0842    140 143  --  140 136   POTASSIUM 4.0 4.2 4.4  --  4.6 4.0   CHLORIDE 106 109 106  --  102 104   CO2 27 25 18*  --  27 23   ANIONGAP 6 7 19*  --  11 9   * 229* 258*  --  261* 185*   BUN 13 15 19  --  25 14   CR 0.86 0.94 1.07 0.99 1.29* 0.86   GFRESTIMATED 87 79 68 75 55* 88   GFRESTBLACK >90 >90 83 >90 67 >90   NILSON 8.6 8.7 9.0  --  9.8 9.0   MAG 1.7 2.1 1.5*  --   --   --    PHOS 1.8*  --  4.9*  --   --   --    PROTTOTAL 5.8*  --  7.0  --  7.9 7.4   ALBUMIN 2.1*  --  2.9*  --  3.4 3.2*   BILITOTAL 3.0*  --  1.0  --  0.8 0.7   ALKPHOS 40  --  44  --  48 49   AST 14  --  9  --  21 12   ALT 13  --  16  --  22 25     CBC    Recent Labs  Lab 04/01/18  0617 03/31/18 0416 03/31/18  0140 03/30/18 1957 03/29/18  0842   WBC 33.9* 19.9*  --  29.2* 11.6*   RBC 4.05* 4.66  --  4.82 4.10*   HGB 13.1* 15.4  --  15.8 13.3   HCT 41.4 48.6  --  48.4 41.7   * 104*  --  100 102*   MCH 32.3 33.0  --  32.8 32.4   MCHC 31.6 31.7  --  32.6 31.9   RDW 15.8* 15.6*  --  15.1* 14.9    232 214 249 220     INR    Recent Labs  Lab 03/31/18  0416 03/29/18  1418   INR 1.14 1.10     Arterial Blood Gas    Recent Labs  Lab 03/31/18  0420 03/31/18  0400   PH 7.29*  --    PCO2 31*  --    PO2 115*  --    HCO3 15*  --    O2PER 6L 6L

## 2018-04-01 NOTE — PROVIDER NOTIFICATION
Transferred from . Vitals upon arrival showed HR 70s-140s. MD notified. EKG done showing afib with RVR, cross cover notified. Sepsis triggered, lactic 1.7. AOx4. Oriented to room, unit, call light. No appetite. Only drinking water.

## 2018-04-01 NOTE — PLAN OF CARE
Problem: Patient Care Overview  Goal: Plan of Care/Patient Progress Review  Pt dsenies any abdominal pain or discomfort all day, no pain meds requested by pt. Pt tolerating sips of water, no appetite for other clears at this time, no c/o nausea. Pt ambulated halls w/ PT, tolerated well on RA, vital signs WDL. Pt to tx to medical floor, report called and given to pt.

## 2018-04-01 NOTE — PLAN OF CARE
Problem: Patient Care Overview  Goal: Plan of Care/Patient Progress Review  Pt c/o abd pain releived w/ Dilaudid. Pt denies any nausea all day, but unable to drink any clear liquids except for sips of water at this time. Pt standby assist to recliner chair, but refuses to ambulate halls sec to abd pain w/ too much activity. MD notified about pt's slowly increasing BP, pt's home meds to be reordered.

## 2018-04-01 NOTE — PLAN OF CARE
Problem: Patient Care Overview  Goal: Plan of Care/Patient Progress Review    4E / Discharge Planner PT   Patient plan for discharge: Home  Current status: PT Eval and 1x treat completed. Patient transfers with CGA, ambulated 300' in hallway with SBA with and without IV pole, mild gait impairment at baseline 2/2 neuropathy, steady throughout. HR SR/-118, SpO2 90%-95% on RA. Encouraged patient to ambulate at least 3x/day while inpatient. No further acute therapy needs at this time. Will complete orders.  Barriers to return to prior living situation: medical status  Recommendations for discharge: Home with assist PRN  Rationale for recommendations: Patient is near baseline level of function, has SEC & FWW if needed, wife able to assist as well.       Entered by: Ran Quezada 04/01/2018 3:47 PM      Physical Therapy Discharge Summary    Reason for therapy discharge:    All goals and outcomes met, no further needs identified.    Progress towards therapy goal(s). See goals on Care Plan in [facility] electronic health record for goal details.  Goals met    Therapy recommendation(s):    Continue ambulation 3x/day while inpatient, progress activity at home to increase aerobic endurance.

## 2018-04-01 NOTE — PROGRESS NOTES
[facility] Service - Medical Student Progress Note  Date of Service: 04/01/2018    ***REFRESH BEFORE SIGNING AND CHANGE DATE ABOVE    Patient: Antoine Russo  MRN: [id number]  Admission Date: 3/30/2018  Hospital Day # 1    Changes Today 04/01/18 :  - Transfer to [place] [id number]  - CLD  - Zosyn till 48 hrs after negative BCx   - Trend QD LFTs BMP CBC   - Hold warfarin until 72 hrs post ERCP Restart 4/1?         Assessment and Plan:     Mr. 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 Had been drinking 4-5 drinks per day until January. He has had 3 hospitalizations since January for pancreatitis. 3/29 he underwent an ERCP w/ sphincterotomy. Then on 3/31 he presented with extreme abdominal pain and nausea w/ bilious vomiting after eating a large breakfast of mini tacos.    #Pancreatitis, recurrent, s/p ERCP  #Lactic Acidosis w/ c/f sepsis resolved- 1.9 most recently  #Abdominal pain, Nausea  Had an IPMN that required distal pancreatectomy and splenectomy in March of '16. This was complicated by a pancreatic leak that required drain placement, an intraabdominal abscess, and a pancreatic duct stent placed May of '16. Had been well until his recent stint of 3 hospitalizations, the first being 1/2-1/3 for EtOH pancreatitis. Admitted to the ICU for concerns of sepsis given his presentation and lactate of 4.8 up to 14.7 initially, has resolved to 1.9 as of 3/31. GI is following and reccommends f/u in 3 weeks with an XR to confirm stent placement, and abx until BCx negative for 48hrs. As well as holding the warfarin for 3 days following the procedure.    - GI following- f/u 3weeks, Zosyn until    - Adequate fluids- continue LR @100cc/hr until adequate PO  - Monitor I/o's  - PRN PO Dilaudid 1mg  - CLD, then if tolerated switch to Soft low fat, low  residue diet  - QD CMP, CBC, Mg, Phos, Ca  - Cont zosyn until 48hrs after negative BCx    #c/f EtOH Abuse  Had been well, but was drinking 4-5 drinks per day leading up to his hospitalization.  -CD consult?      #c/f MARGARET- 2/2 inflammation- resolved  Cr was 0.7-8, up to 1.29 on admission 3/30, but back down to 1 the next day, and near normal 4/1.  -CTM, NTD      #T2DM w/ neuropathy  Last HgbA1c 7.7% on 3/16  - PTA Gabapentin 600mg QHS  - PTA Glipizide   - PTA Metformin    #A-fib #A-fib #HTN #CAD #HLD #PVD  Has been in sinus tachycardia for most of his stay.  - on tele  - PTA Lisinopril 10mg  - Hold warfarin until 72 hrs post ERCP? Restart Today 4/1?    #BPH  -PTA tamsulosin 0.4mg    #h/o Colectomy, w/ ileoanal anastomosis   - Monitor electrolytes/ hydration status    #h/o Oral SCC s/p glossectomy  Reports no dysphagia, decrease in function.  -NTD       FEN:  mL/hr until improved PO  PPx: DVT- SCM           GI- PRN miralax, senna  Disp: >1-2 days pending return to PO status, clinical improvement.  Code: Full     Riley Francois, MS3  [facility] [employer]  Pager [phone]     Interval History:   Reports feeling a lot better than the past couple days. Some mild returning hunger. Tolerating CLD. Pain and tenderness mainly in epigastic/periumbilical region, well controlled with minimal PRN dilaudid, minimal to no N/V. No diarrhea. Denies fever, chills, aches, bloody stools, BRBPR, or difficulty breathing.       Medications:     Current Facility-Administered Medications   Medication     gabapentin (NEURONTIN) capsule 600 mg     naloxone (NARCAN) injection 0.1-0.4 mg     ondansetron (ZOFRAN-ODT) ODT tab 4 mg    Or     ondansetron (ZOFRAN) injection 4 mg     senna-docusate (SENOKOT-S;PERICOLACE) 8.6-50 MG per tablet 1 tablet    Or     senna-docusate (SENOKOT-S;PERICOLACE) 8.6-50 MG per tablet 2 tablet     metoprolol succinate (TOPROL-XL) 24 hr tablet 50 mg     lactated ringers infusion     piperacillin-tazobactam (ZOSYN) 3.375 g  "vial to attach to  mL bag     insulin aspart (NovoLOG) inj (RAPID ACTING)     glucose 40 % gel 15-30 g    Or     dextrose 50 % injection 25-50 mL    Or     glucagon injection 1 mg     HYDROmorphone (DILAUDID) half-tab 1-2 mg     lisinopril (PRINIVIL/ZESTRIL) tablet 10 mg     tamsulosin (FLOMAX) capsule 0.4 mg         Physical Exam:   Vitals were reviewed  Blood pressure 129/65, pulse 117, temperature 98.9  F (37.2  C), temperature source Oral, resp. rate 18, height 1.778 m (5' 10\"), weight 94.2 kg (207 lb 10.8 oz), SpO2 93 %.    Intake/Output Summary (Last 24 hours) at 04/01/18 1346  Last data filed at 04/01/18 1300   Gross per 24 hour   Intake             3310 ml   Output             1475 ml   Net             1835 ml     Vitals:    03/30/18 1902 03/31/18 0100 04/01/18 0300   Weight: 90.7 kg (200 lb) 90.3 kg (199 lb 1.2 oz) 94.2 kg (207 lb 10.8 oz)       Physical Exam:   Gen: In no acute distress. Patient sitting up in chair.  HEENT: No scleral icterus, Moist mucous membranes. No nasal discharge.  CV: Normal rate, regular rhythm. S1/S2 normal.  No murmurs, rubs or gallops   Resp: Clear to auscultation bilaterally. Without wheeze or crackles  Abdomen: Soft, non tender abdomen, normal active bowel sounds,   Extremities: No peripheral edema, warm, capillary refill < 2 seconds  Skin: without rash or trauma  Neuro:   Mentation grossly intact, answers questions appropriately, CN II-xII grossly in tact.           Data:   ROUTINE LABS (Last four results)  CMP  Recent Labs  Lab 04/01/18  0617 03/31/18  1401 03/31/18  0416 03/31/18  0140 03/30/18  1957 03/29/18  0842    140 143  --  140 136   POTASSIUM 4.0 4.2 4.4  --  4.6 4.0   CHLORIDE 106 109 106  --  102 104   CO2 27 25 18*  --  27 23   ANIONGAP 6 7 19*  --  11 9   * 229* 258*  --  261* 185*   BUN 13 15 19  --  25 14   CR 0.86 0.94 1.07 0.99 1.29* 0.86   GFRESTIMATED 87 79 68 75 55* 88   GFRESTBLACK >90 >90 83 >90 67 >90   NILSON 8.6 8.7 9.0  --  9.8 9.0 "   MAG 1.7 2.1 1.5*  --   --   --    PHOS 1.8*  --  4.9*  --   --   --    PROTTOTAL 5.8*  --  7.0  --  7.9 7.4   ALBUMIN 2.1*  --  2.9*  --  3.4 3.2*   BILITOTAL 3.0*  --  1.0  --  0.8 0.7   ALKPHOS 40  --  44  --  48 49   AST 14  --  9  --  21 12   ALT 13  --  16  --  22 25     CBC  Recent Labs  Lab 04/01/18  0617 03/31/18  0416 03/31/18  0140 03/30/18  1957 03/29/18  0842   WBC 33.9* 19.9*  --  29.2* 11.6*   RBC 4.05* 4.66  --  4.82 4.10*   HGB 13.1* 15.4  --  15.8 13.3   HCT 41.4 48.6  --  48.4 41.7   * 104*  --  100 102*   MCH 32.3 33.0  --  32.8 32.4   MCHC 31.6 31.7  --  32.6 31.9   RDW 15.8* 15.6*  --  15.1* 14.9    232 214 249 220     INR  Recent Labs  Lab 03/31/18  0416 03/29/18  1418   INR 1.14 1.10     Arterial Blood Gas  Recent Labs  Lab 03/31/18  0420 03/31/18  0400   PH 7.29*  --    PCO2 31*  --    PO2 115*  --    HCO3 15*  --    O2PER 6L 6L     I&O's: I/O last 3 completed shifts:  In: 7246.67 [P.O.:170; I.V.:4796.67]  Out: 1625 [Urine:1625]

## 2018-04-01 NOTE — PLAN OF CARE
Problem: Pancreatitis, Acute/Chronic (Adult)  Goal: Signs and Symptoms of Listed Potential Problems Will be Absent, Minimized or Managed (Pancreatitis, Acute/Chronic)  Signs and symptoms of listed potential problems will be absent, minimized or managed by discharge/transition of care (reference Pancreatitis, Acute/Chronic (Adult) CPG).  Outcome: Improving  Overnight:  1) No acute events overnight. Patient upset about vital signs every hour, specifically for comfort sleeping wise. MICU you cross cover informed, ordering Q4H Vital signs. All VSS.  2) Patient voiding frequently 100cc at a time, patient attributes to not having flomax. (ordered for AM 4/1) Patient bladder scanned <100cc.  3) Abx Given.     I: Monitored vitals and assessed pt status.     Running:  LR @ (100cc/hr)  PRN: Dilaudid 2mg (Q3H)       A:  Neuro: Afebrile. A0x4. ABD pain controlled with PRN medications.   Resp: VSS on RA, however patient put on 1-2L NC overnight with pain medications.   CV: NSR, MAPs WNL. See Flowsheet.  : 600 cc of UO this shift, thus far.  GI: No BM.   Endo: Blood sugars controlled with Q4H SSI.    Diet: Clear liquid, patient refusing to eat d/t Nausea.        P: Continue to monitor Pt status and report changes to treatment team.

## 2018-04-02 LAB
ALBUMIN SERPL-MCNC: 1.8 G/DL (ref 3.4–5)
ALP SERPL-CCNC: 46 U/L (ref 40–150)
ALT SERPL W P-5'-P-CCNC: 14 U/L (ref 0–70)
ANION GAP SERPL CALCULATED.3IONS-SCNC: 12 MMOL/L (ref 3–14)
AST SERPL W P-5'-P-CCNC: 17 U/L (ref 0–45)
BILIRUB SERPL-MCNC: 2.8 MG/DL (ref 0.2–1.3)
BUN SERPL-MCNC: 13 MG/DL (ref 7–30)
CA-I BLD-MCNC: 4.3 MG/DL (ref 4.4–5.2)
CALCIUM SERPL-MCNC: 8.2 MG/DL (ref 8.5–10.1)
CHLORIDE SERPL-SCNC: 104 MMOL/L (ref 94–109)
CO2 SERPL-SCNC: 22 MMOL/L (ref 20–32)
CREAT SERPL-MCNC: 0.89 MG/DL (ref 0.66–1.25)
ERYTHROCYTE [DISTWIDTH] IN BLOOD BY AUTOMATED COUNT: 15.4 % (ref 10–15)
GFR SERPL CREATININE-BSD FRML MDRD: 85 ML/MIN/1.7M2
GLUCOSE BLDC GLUCOMTR-MCNC: 147 MG/DL (ref 70–99)
GLUCOSE BLDC GLUCOMTR-MCNC: 161 MG/DL (ref 70–99)
GLUCOSE BLDC GLUCOMTR-MCNC: 164 MG/DL (ref 70–99)
GLUCOSE BLDC GLUCOMTR-MCNC: 165 MG/DL (ref 70–99)
GLUCOSE BLDC GLUCOMTR-MCNC: 166 MG/DL (ref 70–99)
GLUCOSE BLDC GLUCOMTR-MCNC: 175 MG/DL (ref 70–99)
GLUCOSE SERPL-MCNC: 175 MG/DL (ref 70–99)
HCT VFR BLD AUTO: 36.5 % (ref 40–53)
HGB BLD-MCNC: 12 G/DL (ref 13.3–17.7)
INTERPRETATION ECG - MUSE: NORMAL
LACTATE BLD-SCNC: 1.3 MMOL/L (ref 0.4–1.9)
MAGNESIUM SERPL-MCNC: 1.7 MG/DL (ref 1.6–2.3)
MAGNESIUM SERPL-MCNC: 1.8 MG/DL (ref 1.6–2.3)
MCH RBC QN AUTO: 32.5 PG (ref 26.5–33)
MCHC RBC AUTO-ENTMCNC: 32.9 G/DL (ref 31.5–36.5)
MCV RBC AUTO: 99 FL (ref 78–100)
PHOSPHATE SERPL-MCNC: 1.9 MG/DL (ref 2.5–4.5)
PLATELET # BLD AUTO: 193 10E9/L (ref 150–450)
POTASSIUM SERPL-SCNC: 3.6 MMOL/L (ref 3.4–5.3)
POTASSIUM SERPL-SCNC: 4.3 MMOL/L (ref 3.4–5.3)
PROT SERPL-MCNC: 5.7 G/DL (ref 6.8–8.8)
RBC # BLD AUTO: 3.69 10E12/L (ref 4.4–5.9)
SODIUM SERPL-SCNC: 139 MMOL/L (ref 133–144)
WBC # BLD AUTO: 29.9 10E9/L (ref 4–11)

## 2018-04-02 PROCEDURE — 83735 ASSAY OF MAGNESIUM: CPT | Performed by: INTERNAL MEDICINE

## 2018-04-02 PROCEDURE — 36415 COLL VENOUS BLD VENIPUNCTURE: CPT | Performed by: STUDENT IN AN ORGANIZED HEALTH CARE EDUCATION/TRAINING PROGRAM

## 2018-04-02 PROCEDURE — 84132 ASSAY OF SERUM POTASSIUM: CPT | Performed by: STUDENT IN AN ORGANIZED HEALTH CARE EDUCATION/TRAINING PROGRAM

## 2018-04-02 PROCEDURE — 25000128 H RX IP 250 OP 636: Performed by: STUDENT IN AN ORGANIZED HEALTH CARE EDUCATION/TRAINING PROGRAM

## 2018-04-02 PROCEDURE — 83735 ASSAY OF MAGNESIUM: CPT | Performed by: STUDENT IN AN ORGANIZED HEALTH CARE EDUCATION/TRAINING PROGRAM

## 2018-04-02 PROCEDURE — 25000132 ZZH RX MED GY IP 250 OP 250 PS 637: Performed by: STUDENT IN AN ORGANIZED HEALTH CARE EDUCATION/TRAINING PROGRAM

## 2018-04-02 PROCEDURE — 82330 ASSAY OF CALCIUM: CPT | Performed by: INTERNAL MEDICINE

## 2018-04-02 PROCEDURE — 00000146 ZZHCL STATISTIC GLUCOSE BY METER IP

## 2018-04-02 PROCEDURE — 83605 ASSAY OF LACTIC ACID: CPT | Performed by: INTERNAL MEDICINE

## 2018-04-02 PROCEDURE — 85027 COMPLETE CBC AUTOMATED: CPT | Performed by: INTERNAL MEDICINE

## 2018-04-02 PROCEDURE — 36416 COLLJ CAPILLARY BLOOD SPEC: CPT | Performed by: INTERNAL MEDICINE

## 2018-04-02 PROCEDURE — 12000008 ZZH R&B INTERMEDIATE UMMC

## 2018-04-02 PROCEDURE — 99233 SBSQ HOSP IP/OBS HIGH 50: CPT | Mod: GC | Performed by: INTERNAL MEDICINE

## 2018-04-02 PROCEDURE — 36415 COLL VENOUS BLD VENIPUNCTURE: CPT | Performed by: INTERNAL MEDICINE

## 2018-04-02 PROCEDURE — 25000132 ZZH RX MED GY IP 250 OP 250 PS 637: Performed by: INTERNAL MEDICINE

## 2018-04-02 PROCEDURE — 80053 COMPREHEN METABOLIC PANEL: CPT | Performed by: INTERNAL MEDICINE

## 2018-04-02 PROCEDURE — 25000125 ZZHC RX 250: Performed by: STUDENT IN AN ORGANIZED HEALTH CARE EDUCATION/TRAINING PROGRAM

## 2018-04-02 PROCEDURE — 84100 ASSAY OF PHOSPHORUS: CPT | Performed by: INTERNAL MEDICINE

## 2018-04-02 RX ORDER — METOPROLOL TARTRATE 50 MG
50 TABLET ORAL 2 TIMES DAILY
Status: DISCONTINUED | OUTPATIENT
Start: 2018-04-03 | End: 2018-04-04 | Stop reason: HOSPADM

## 2018-04-02 RX ORDER — FUROSEMIDE 10 MG/ML
20 INJECTION INTRAMUSCULAR; INTRAVENOUS ONCE
Status: COMPLETED | OUTPATIENT
Start: 2018-04-02 | End: 2018-04-02

## 2018-04-02 RX ORDER — POTASSIUM CHLORIDE 20MEQ/15ML
40 LIQUID (ML) ORAL ONCE
Status: COMPLETED | OUTPATIENT
Start: 2018-04-02 | End: 2018-04-02

## 2018-04-02 RX ORDER — WARFARIN SODIUM 5 MG/1
5 TABLET ORAL
Status: COMPLETED | OUTPATIENT
Start: 2018-04-02 | End: 2018-04-02

## 2018-04-02 RX ORDER — METOPROLOL SUCCINATE 50 MG/1
50 TABLET, EXTENDED RELEASE ORAL ONCE
Status: COMPLETED | OUTPATIENT
Start: 2018-04-02 | End: 2018-04-02

## 2018-04-02 RX ORDER — METOPROLOL SUCCINATE 50 MG/1
50 TABLET, EXTENDED RELEASE ORAL ONCE
Status: DISCONTINUED | OUTPATIENT
Start: 2018-04-02 | End: 2018-04-02

## 2018-04-02 RX ADMIN — POTASSIUM PHOSPHATE, MONOBASIC 500 MG: 500 TABLET, SOLUBLE ORAL at 08:53

## 2018-04-02 RX ADMIN — PIPERACILLIN AND TAZOBACTAM 3.38 G: 3; .375 INJECTION, POWDER, LYOPHILIZED, FOR SOLUTION INTRAVENOUS; PARENTERAL at 15:51

## 2018-04-02 RX ADMIN — SODIUM CHLORIDE, POTASSIUM CHLORIDE, SODIUM LACTATE AND CALCIUM CHLORIDE: 600; 310; 30; 20 INJECTION, SOLUTION INTRAVENOUS at 02:21

## 2018-04-02 RX ADMIN — POTASSIUM PHOSPHATE, MONOBASIC AND POTASSIUM PHOSPHATE, DIBASIC 20 MMOL: 224; 236 INJECTION, SOLUTION INTRAVENOUS at 00:42

## 2018-04-02 RX ADMIN — POTASSIUM PHOSPHATE, MONOBASIC 500 MG: 500 TABLET, SOLUBLE ORAL at 12:27

## 2018-04-02 RX ADMIN — FUROSEMIDE 20 MG: 10 INJECTION, SOLUTION INTRAVENOUS at 12:25

## 2018-04-02 RX ADMIN — INSULIN ASPART 1 UNITS: 100 INJECTION, SOLUTION INTRAVENOUS; SUBCUTANEOUS at 00:16

## 2018-04-02 RX ADMIN — METOPROLOL SUCCINATE 50 MG: 50 TABLET, EXTENDED RELEASE ORAL at 08:53

## 2018-04-02 RX ADMIN — Medication 2 G: at 22:48

## 2018-04-02 RX ADMIN — INSULIN ASPART 1 UNITS: 100 INJECTION, SOLUTION INTRAVENOUS; SUBCUTANEOUS at 12:26

## 2018-04-02 RX ADMIN — LISINOPRIL 10 MG: 5 TABLET ORAL at 08:53

## 2018-04-02 RX ADMIN — GABAPENTIN 600 MG: 300 CAPSULE ORAL at 22:48

## 2018-04-02 RX ADMIN — INSULIN ASPART 1 UNITS: 100 INJECTION, SOLUTION INTRAVENOUS; SUBCUTANEOUS at 04:21

## 2018-04-02 RX ADMIN — METOPROLOL SUCCINATE 50 MG: 50 TABLET, EXTENDED RELEASE ORAL at 12:26

## 2018-04-02 RX ADMIN — INSULIN ASPART 1 UNITS: 100 INJECTION, SOLUTION INTRAVENOUS; SUBCUTANEOUS at 08:54

## 2018-04-02 RX ADMIN — FUROSEMIDE 20 MG: 10 INJECTION, SOLUTION INTRAVENOUS at 17:27

## 2018-04-02 RX ADMIN — POTASSIUM PHOSPHATE, MONOBASIC 500 MG: 500 TABLET, SOLUBLE ORAL at 17:27

## 2018-04-02 RX ADMIN — PIPERACILLIN AND TAZOBACTAM 3.38 G: 3; .375 INJECTION, POWDER, LYOPHILIZED, FOR SOLUTION INTRAVENOUS; PARENTERAL at 03:34

## 2018-04-02 RX ADMIN — TAMSULOSIN HYDROCHLORIDE 0.4 MG: 0.4 CAPSULE ORAL at 08:52

## 2018-04-02 RX ADMIN — PIPERACILLIN AND TAZOBACTAM 3.38 G: 3; .375 INJECTION, POWDER, LYOPHILIZED, FOR SOLUTION INTRAVENOUS; PARENTERAL at 08:54

## 2018-04-02 RX ADMIN — POTASSIUM PHOSPHATE, MONOBASIC 500 MG: 500 TABLET, SOLUBLE ORAL at 22:48

## 2018-04-02 RX ADMIN — PIPERACILLIN AND TAZOBACTAM 3.38 G: 3; .375 INJECTION, POWDER, LYOPHILIZED, FOR SOLUTION INTRAVENOUS; PARENTERAL at 22:47

## 2018-04-02 RX ADMIN — INSULIN ASPART 1 UNITS: 100 INJECTION, SOLUTION INTRAVENOUS; SUBCUTANEOUS at 20:01

## 2018-04-02 RX ADMIN — POTASSIUM CHLORIDE 40 MEQ: 40 SOLUTION ORAL at 23:25

## 2018-04-02 RX ADMIN — WARFARIN SODIUM 5 MG: 5 TABLET ORAL at 17:27

## 2018-04-02 RX ADMIN — POTASSIUM PHOSPHATE, MONOBASIC 500 MG: 500 TABLET, SOLUBLE ORAL at 00:28

## 2018-04-02 RX ADMIN — INSULIN ASPART 1 UNITS: 100 INJECTION, SOLUTION INTRAVENOUS; SUBCUTANEOUS at 18:35

## 2018-04-02 NOTE — PROGRESS NOTES
[facility], [place]    Internal Medicine Progress Note - [facility] Service    Main Plans for Today   Full liquid diet  Metoprolol 100 mg XL  Start Warfarin  Cont zosyn  Nutrition consult  D/C cont fluids  20 mg lasix IV BID    Additional 5 mg metoprolol IV prn for afib     Assessment & Plan    72 year old male with chronic EtOH pancreatitis, h/o IPMN s/p resection in 2016 with persistent panc tail leak who underwent ERCP with sphincterotomy and stent placed to common bile duct and pancreatic duct on 3/29 presented with acute pancreatitis after eating a meal of mini tacos for breakfast.     # Pancreatitis s/p ERCP  # Lactic acidosis, resolved  # Abd pain, nausea  Has hx of IPMN treated with distal pancreatectomy and splenectomy one year ago, c/b pancreatic leak s/p drain placement, s/p intra-abdominal abscess, and pancreatic duct stent (May 2016). Developed EtOH pancreatitis in Jan 2018 and was admitted to the ICU for lactate of 14.7 and c/f sepsis.   Plan:  -cont zosyn   -full liquid diet  -nutrition consult  -GI following  -follow Mg, Phos, K for refeeding  -prn dilaudid for pain      # Atrial Fibrillation w/RVR  Pt has -140s. Currently vol overloaded and HR is responding to diuresis. Pt on PTA metoprolol 50 mg BID of long-acting (unsure why).   Plan:  -switch from 50 mg BID of long acting metoprolol to 50 mg BID of short-acting  -if pt in afib, assess for vol overload and give lasix  -metoprolol 5 mg IV prn  -restarted warfarin 4/2    Chronic    #HTN  -cont PTA lisinopril    #DMII  Holding home metformin 1000 mg BID, glipizide 2.5 mg qday  -BG q4h  -medium ssi  -hypoglycemia protocol    #Neuropathy  -cont PTA gabapentin    #BPH  -cont PTA tamsulosin  # Pain Assessment:  Current Pain Score 4/2/2018   Patient currently in pain? denies   Pain score (0-10) -   Pain location -   Pain descriptors -   Antoine piña pain level was assessed and he currently denies pain.      Diet: Calorie  Counts  Full Liquid Diet  Fluids: none  DVT Prophylaxis: Ambulate every shift  Code Status: Full Code    Disposition Plan   Expected discharge: 2 - 3 days, recommended to prior living arrangement once safe dispo.     Entered: Ivy Quintero 04/02/2018, 3:05 PM   Information in the above section will display in the discharge planner report.      The patient's care was discussed with the Attending Physician, Dr. Pressley.    Ivy Quintero  [facility]  Pager: [phone]  Please see sticky note for cross cover information    Interval History   Pt had afib O/N that responded to lasix. Also found to have low phos that was repleted. Does not have abd pain, no nausea or vomiting, or f/c.    Physical Exam   Vital Signs: Temp: 96.3  F (35.7  C) Temp src: Oral BP: 132/74 Pulse: 109 Heart Rate: 106 Resp: 18 SpO2: 93 % O2 Device: Nasal cannula Oxygen Delivery: 2 LPM  Weight: 209 lbs 0 oz  General Appearance: Lying in bed, in NAD  Respiratory: crackles in lower lobes, with diffuse wheezes on expiration  Cardiovascular: tachycardic, irregular, no mrg  GI: soft, nontender, nondistended  Skin: no visible rashes  Other: Extremities with mild pitting edema bilaterally        Data   Medications     Warfarin Therapy Reminder         [START ON 4/3/2018] metoprolol tartrate  50 mg Oral BID     potassium phosphate (monobasic)  500 mg Oral 4x Daily w/meals     gabapentin  600 mg Oral At Bedtime     piperacillin-tazobactam  3.375 g Intravenous Q6H     insulin aspart  1-6 Units Subcutaneous Q4H     lisinopril  10 mg Oral Daily     tamsulosin  0.4 mg Oral Daily     Data     Recent Labs  Lab 04/02/18  0707 04/01/18  2120 04/01/18  0617 03/31/18  1401 03/31/18  0416  03/30/18  1957 03/29/18  1418   WBC 29.9*  --  33.9*  --  19.9*  --  29.2*  --    HGB 12.0*  --  13.1*  --  15.4  --  15.8  --    MCV 99  --  102*  --  104*  --  100  --      --  186  --  232  < > 249  --    INR  --   --   --   --  1.14  --    --  1.10     --  139 140 143  --  140  --    POTASSIUM 4.3 3.6 4.0 4.2 4.4  --  4.6  --    CHLORIDE 104  --  106 109 106  --  102  --    CO2 22  --  27 25 18*  --  27  --    BUN 13  --  13 15 19  --  25  --    CR 0.89  --  0.86 0.94 1.07  < > 1.29*  --    ANIONGAP 12  --  6 7 19*  --  11  --    NILSON 8.2*  --  8.6 8.7 9.0  --  9.8  --    *  --  166* 229* 258*  --  261*  --    ALBUMIN 1.8*  --  2.1*  --  2.9*  --  3.4  --    PROTTOTAL 5.7*  --  5.8*  --  7.0  --  7.9  --    BILITOTAL 2.8*  --  3.0*  --  1.0  --  0.8  --    ALKPHOS 46  --  40  --  44  --  48  --    ALT 14  --  13  --  16  --  22  --    AST 17  --  14  --  9  --  21  --    LIPASE  --   --  3108*  --  94933*  --  9697*  --    TROPI  --   --   --   --   --   --  <0.015  --    < > = values in this interval not displayed.  No results found for this or any previous visit (from the past 24 hour(s)).

## 2018-04-02 NOTE — PROGRESS NOTES
GASTROENTEROLOGY CONSULTATION      Date of Admission: 3/30/2018                  ASSESSMENT AND RECOMMENDATIONS:   Assessment:  Antoine Russo is a 72 year old male with a history of recurrent pancreatitis s/p PD stent, CBD stent in May who is here with acute pancreatitis after eating tacos. He has been sober since January.     Recommendations  ADAT; current recommendations with pancreatitis are to start feeding earlier rather than later.  Consider nutrition consult  Hold any IV fluids-becoming slightly overloaded  Ok from GI perspective to anticoagulate his AF      Patient care plan discussed with Dr. Beasley, GI staff physician. Thank you for involving us in this patient's care. Please do not hesitate to contact the GI service with any questions or concerns.     VAN Brewer  Internal Medicine PGY-1  Department of Gastroenterology   -------------------------------------------------------------------------------------------------------------------   History is obtained from the patient and the medical record.          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   He is here with another episode of pancreatitis.  He previously had a PD stent 05/2016 and this is currently still in place on imaging.            Past Medical History:   Reviewed and edited as appropriate  Past Medical History:   Diagnosis Date     C. 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     Noninfectious ileitis             Past Surgical History:   Reviewed and edited as appropriate   Past Surgical History:   Procedure Laterality Date     BREAST SURGERY  2008    right  breast mass benign     COLONOSCOPY      multiple polyps removed     COLONOSCOPY  8/24/2011    Procedure:COMBINED COLONOSCOPY, REMOVE TUMOR/POLYP/LESION BY SNARE; Surgeon:MILEY ARBOLEDA; Location:[facility]     COLONOSCOPY  12/17/2012    Procedure: COLONOSCOPY;;  Surgeon: Leon Maurer MD;  Location: [facility]     COLONOSCOPY  12/18/2012    Procedure: COLONOSCOPY;;  Surgeon: Leon Maurer MD;  [facility]     DENERVATION OF SPERMATIC CORD MICROSURGICAL Left 5/23/2017    Procedure: DENERVATION OF SPERMATIC CORD MICROSURGICAL;;  Surgeon: Marcio Aggarwal MD;  [facility]     DISSECTION RADICAL NECK BILATERAL  8/2/2012    Procedure: DISSECTION RADICAL NECK BILATERAL;;  Surgeon: Yung Alvares MD;  [facility]     ENDOSCOPIC RETROGRADE CHOLANGIOPANCREATOGRAM N/A 5/10/2016    Procedure: COMBINED ENDOSCOPIC RETROGRADE CHOLANGIOPANCREATOGRAPHY, PLACE TUBE/STENT;  Surgeon: Yovanny Beasley MD;  [facility]     ENDOSCOPIC RETROGRADE CHOLANGIOPANCREATOGRAM N/A 3/29/2018    Procedure: ENDOSCOPIC RETROGRADE CHOLANGIOPANCREATOGRAM;  Endoscopic Retrograde Cholangiopancreatogram, Endoscopic Ultrasound, Biliary Sphincterotomy, Biliary and Pancreatic Stent Placement;  Surgeon: Yovanny Beasley MD;  [facility]     ENDOSCOPIC ULTRASOUND UPPER GASTROINTESTINAL TRACT (GI) N/A 2/3/2016    Procedure: ENDOSCOPIC ULTRASOUND, ESOPHAGOSCOPY / UPPER GASTROINTESTINAL TRACT (GI);  Surgeon: Grabiel Plata MD;  [facility]     ENDOSCOPIC ULTRASOUND UPPER GASTROINTESTINAL TRACT (GI) N/A 3/29/2018    Procedure: ENDOSCOPIC ULTRASOUND, ESOPHAGOSCOPY / UPPER GASTROINTESTINAL TRACT (GI);;  Surgeon: Grabiel Plata MD;  [facility]     ESOPHAGOSCOPY, GASTROSCOPY, DUODENOSCOPY (EGD), COMBINED N/A 2/3/2016    Procedure: COMBINED ENDOSCOPIC ULTRASOUND, ESOPHAGOSCOPY, GASTROSCOPY, DUODENOSCOPY (EGD), FINE NEEDLE ASPIRATE/BIOPSY;  Surgeon: Grabiel Plata MD;  [facility]      ESOPHAGOSCOPY, GASTROSCOPY, DUODENOSCOPY (EGD), COMBINED N/A 6/8/2016    Procedure: COMBINED ESOPHAGOSCOPY, GASTROSCOPY, DUODENOSCOPY (EGD), REMOVE FOREIGN BODY;  Surgeon: Yovanny Beasley MD;  [facility]     EXCISE LESION INTRAORAL  6/14/2012    Procedure: EXCISE LESION INTRAORAL;  Wide Local Excision Floor of Mouth, Direct Laryngoscopy, Bilateral Ida's Marsuplization, Split Thickness Skin Graft from right Thigh  Latex Safe;  Surgeon: Gerson Ravi MD;  [facility]     EXCISE LESION INTRAORAL  8/2/2012    Procedure: EXCISE LESION INTRAORAL;  Floor of Mouth Resection, Bilateral Selective Radical Neck Dissection, Tracheostomy, Left Radial Forearm  Free Flap with Alloderm, Nasogastric Feeding Tube Placement,    * Latex Safe*;  Surgeon: Gerson Ravi MD;  [facility]     EXCISE LESION INTRAORAL  12/11/2012    Procedure: EXCISE LESION INTRAORAL;  takedown of oral flap;  Surgeon: Yung Alvares MD;  [facility]     GRAFT FREE VASCULARIZED (LOCATION)  8/2/2012    Procedure: GRAFT FREE VASCULARIZED (LOCATION);;  Surgeon: Yung Alvares MD;  [facility]     GRAFT SKIN SPLIT THICKNESS FROM EXTREMITY  6/14/2012    Procedure: GRAFT SKIN SPLIT THICKNESS FROM EXTREMITY;;  Surgeon: Gerson Ravi MD;  [facility]     LAPAROSCOPIC ILEOSTOMY TAKEDOWN  6/6/2013    Procedure: LAPAROSCOPIC ILEOSTOMY TAKEDOWN;  Laparoscopic Closure of Enterostomy, Guerda's Type with IleoRectal Anastomosis ;  Surgeon: Grabiel Riddle MD;  [facility]     LAPAROTOMY EXPLORATORY  12/20/2012    Procedure: LAPAROTOMY EXPLORATORY;  Exploratory Laparotomy, total abdominal colectomy, ileostomy formation;  Surgeon: Miquel Cannon MD;  [facility]     LARYNGOSCOPY  6/14/2012    Procedure: LARYNGOSCOPY;;  Surgeon: Gerson Ravi MD;  [facility]     ORTHOPEDIC SURGERY      ganglian cyst left ankle     PANCREATECTOMY, SPLENECTOMY N/A 3/10/2016    Procedure: PANCREATECTOMY, SPLENECTOMY;  Surgeon:  Nael Abel MD;  [facility]     SHOULDER SURGERY  2006, 2008    2006- right rotator cuff, 2008 bone spur on left. Dr. Hdez     VARICOCELECTOMY Left 5/23/2017    Procedure: VARICOCELECTOMY;  Left Varicocele Repair, Denervation of Left Testis;  Surgeon: Marcio Aggarwal MD;  [facility]            Previous Endoscopy:     Results for orders placed or performed during the hospital encounter of 12/16/12   COLONOSCOPY   Result Value Ref Range    COLONOSCOPY       [facility], [place]   [address] [place] [address] [phone]     Endoscopy Department  _______________________________________________________________________________  Patient Name: Antoine Rafaela        Procedure Date: 12/18/2012 12:12:   A12/P12  MRN: [id number]                       Account Number: [id number]                  YOB: 1945             Admit Type: Inpatient                       Age: 66                               Room:                                       Gender: Male                          Note Status: Finalized                      Attending MD: Leon Maurer MD      _______________________________________________________________________________     Procedure:                Colonoscopy  Indications:              Megacolon  Providers:                Leon Maurer MD, Anushka Yang RN  Patient Profile:          This is a 66 year old male with C. difficile                             associated toxic megacolon. The patient clinically                             improved yesterday, but clinical progress stalled                             today and CRP increased up to 154 this morning.  Referring MD:             Zac Mayes MD  Medicines:                Midazolam 1 mg IV, Fentanyl 100 micrograms IV  Complications:            No immediate complications  _______________________________________________________________________________  Procedure:                 Pre-Anesthesia Assessment:                            - Airway Examination: normal oropharyngeal airway                             and neck mobility.                            - Respiratory Examination: clear to auscultation.                            - CV Examination: irregularly, irregular rhythm                             with HR of 125-140.                            - ASA Grade Assessment: III - A patient with severe                             systemic disease.                            - After reviewing the risks and benefits, the                             patient was deemed in satisfactory condition to                             undergo the procedure.                            - The anesthesia plan was to use moderate                             sedation/analgesia (conscious sedation).                            - Immediately prior to administration of                             medications, the patient was re-assessed for                             adequacy to receive sedatives.                            - The heart rate, respiratory rate, oxygen                             saturations, blood pressure, adequacy of pulmonary                             ventilation, and response to care were monitored                             throughout the procedure.                            - The physical status of the patient was                             re-assessed after the procedure.                            After obtaining informed consent, the colonoscope                             was passed under direct vision. Throughout the                             procedure, the patient's blood pressure, pulse, and                             oxygen saturations were monitored continuously. The                             Colonoscope was introduced through the anus and                             advanced to the ileocecal valve. The colonoscopy                             was performed without  difficulty with CO2                             insufflation. The patient tolerated the procedure                             well. The quality of the bowel preparation was fair.                                                                                   Findings:       Diffuse pseudomembranes were found in the transverse colon and in the        ascending colon. Copious GoLytely prep was aspirated. FMT was repeated.        Gas was aspirated on withdrawal.                                                                                   Impression:               - Preparation of the colon was fair.                            - Pseudomembranous enterocolitis.  Recommendation:           - Return patient to hospital antoine for ongoing care.                            - Patient's atrial fibrillation is still not rate                             controlled. He may need increased dosage of beta                             blocker.                            - Antibiotics should still be held at this time.                            - NG tube can be discontinued and patient can be                             started on clear liquid diet.                            - He should continue to be closely monitored with                             physical examination, CRP levels, and WBC count.                                                                                     Signed Electronically by Leon Maurer MD  _____________________  Leon Maurer MD  Signed Date: 12/18/2012 14:12:SS A12/P12  Number of Addenda: 0  I was physically present for the entire viewing portion of the exam.  __________________________  Signature of teaching physician  B4c/D4c  Note Initiated On: 12/18/2012 12:12:SS A12/P12  Scope Withdrawal Time:   Scope Withdrawal Time:   Total Procedure Duration:             Social History:   Reviewed and edited as appropriate  Social History     Social History     Marital status:      Spouse  name: N/A     Number of children: N/A     Years of education: N/A     Occupational History     [employer]      20 hr/week monday-Thur 7-noon     [place] [employer]     RETIRED      Social History Main Topics     Smoking status: Former Smoker     Packs/day: 1.00     Years: 40.00     Types: Cigarettes     Quit date: 2006     Smokeless tobacco: Never Used     Alcohol use No     Drug use: No     Sexual activity: Yes     Partners: Female     Other Topics Concern      Service Yes     [employer]erves 6 years     Blood Transfusions No     Caffeine Concern Yes     0-2 cups     Occupational Exposure No     retired     Hobby Hazards No     Sleep Concern No     Stress Concern No     Weight Concern No     Special Diet Yes     healthy     Back Care No     Exercise Yes     Bike Helmet Not Asked     N/A     Seat Belt Yes     Self-Exams Yes     Parent/Sibling W/ Cabg, Mi Or Angioplasty Before 65f 55m? No     Social History Narrative    Son lives in [place] with 2 grandchildren 19 and 6            Family History:   Reviewed and edited as appropriate  Family History   Problem Relation Age of Onset     DIABETES Sister      onset age 50     Alzheimer Disease Mother       80     Alzheimer Disease Father       85     DIABETES Other      nephew type 1     DIABETES Other      Anesthesia Reaction No family hx of      Colon Cancer No family hx of      Colon Polyps No family hx of      Crohn Disease No family hx of      Ulcerative Colitis No family hx of            Allergies:   Reviewed and edited as appropriate     Allergies   Allergen Reactions     Nkda [No Known Drug Allergies]             Medications:     Current Facility-Administered Medications   Medication     Warfarin Therapy Reminder (Check START DATE - warfarin may be starting in the FUTURE)     [START ON 4/3/2018] metoprolol tartrate (LOPRESSOR) tablet 50 mg     warfarin (COUMADIN) tablet 5 mg     potassium phosphate  "(monobasic) (K-PHOS) tablet 500 mg     gabapentin (NEURONTIN) capsule 600 mg     naloxone (NARCAN) injection 0.1-0.4 mg     ondansetron (ZOFRAN-ODT) ODT tab 4 mg    Or     ondansetron (ZOFRAN) injection 4 mg     senna-docusate (SENOKOT-S;PERICOLACE) 8.6-50 MG per tablet 1 tablet    Or     senna-docusate (SENOKOT-S;PERICOLACE) 8.6-50 MG per tablet 2 tablet     piperacillin-tazobactam (ZOSYN) 3.375 g vial to attach to  mL bag     insulin aspart (NovoLOG) inj (RAPID ACTING)     glucose 40 % gel 15-30 g    Or     dextrose 50 % injection 25-50 mL    Or     glucagon injection 1 mg     HYDROmorphone (DILAUDID) half-tab 1-2 mg     lisinopril (PRINIVIL/ZESTRIL) tablet 10 mg     tamsulosin (FLOMAX) capsule 0.4 mg               Physical Exam:   /74 (BP Location: Right arm)  Pulse 109  Temp 96.3  F (35.7  C) (Oral)  Resp 18  Ht 1.778 m (5' 10\")  Wt 94.8 kg (209 lb)  SpO2 93%  BMI 29.99 kg/m2  Wt:   Wt Readings from Last 2 Encounters:   04/02/18 94.8 kg (209 lb)   03/30/18 90.7 kg (200 lb)      Constitutional: cooperative, pleasant, not dyspneic/diaphoretic, no acute distress  Eyes: Sclera anicteric/injected  CV: pedal edema  Respiratory: Unlabored breathing, speaking in full sentences without difficulty  Abd: Nondistended  Skin: warm, perfused, no jaundice  Neuro: AAO x 3,   Psych: Normal affect           Data:   Labs and imaging below were independently reviewed and interpreted    BMP  Recent Labs  Lab 04/02/18  0707 04/01/18  2120 04/01/18  0617 03/31/18  1401 03/31/18  0416     --  139 140 143   POTASSIUM 4.3 3.6 4.0 4.2 4.4   CHLORIDE 104  --  106 109 106   NILSON 8.2*  --  8.6 8.7 9.0   CO2 22  --  27 25 18*   BUN 13  --  13 15 19   CR 0.89  --  0.86 0.94 1.07   *  --  166* 229* 258*     CBC  Recent Labs  Lab 04/02/18  0707 04/01/18  0617 03/31/18  0416 03/31/18  0140 03/30/18 1957   WBC 29.9* 33.9* 19.9*  --  29.2*   RBC 3.69* 4.05* 4.66  --  4.82   HGB 12.0* 13.1* 15.4  --  15.8   HCT 36.5* " 41.4 48.6  --  48.4   MCV 99 102* 104*  --  100   MCH 32.5 32.3 33.0  --  32.8   MCHC 32.9 31.6 31.7  --  32.6   RDW 15.4* 15.8* 15.6*  --  15.1*    186 232 214 249     INR  Recent Labs  Lab 03/31/18  0416 03/29/18  1418   INR 1.14 1.10     LFTs  Recent Labs  Lab 04/02/18  0707 04/01/18  0617 03/31/18 0416 03/30/18 1957   ALKPHOS 46 40 44 48   AST 17 14 9 21   ALT 14 13 16 22   BILITOTAL 2.8* 3.0* 1.0 0.8   PROTTOTAL 5.7* 5.8* 7.0 7.9   ALBUMIN 1.8* 2.1* 2.9* 3.4      PANC  Recent Labs  Lab 04/01/18  0617 03/31/18 0416 03/30/18 1957 03/29/18  0842   LIPASE 3108* 02390* 9697* 323   AMYLASE  --   --   --  56     Seen and examined with GI fellow, agree with findings and recommendations.    Yovanny Beasley MD GI Staff

## 2018-04-02 NOTE — PROGRESS NOTES
Patient tachycardic -160s. Requiring 2L O2 in order to maintain sats >90%. Patient mentating well. Denies SOB. Does not feel particularly swollen. On exam lungs have crackles bilaterally mid and lower lobes. Also with elevated JVD. No peripheral edema. Ordered K,Mg,Ph in addition to given 20 IV Lasix.

## 2018-04-02 NOTE — PROGRESS NOTES
04/01/18 1500   Quick Adds   Type of Visit Initial PT Evaluation   Living Environment   Lives With spouse   Living Arrangements house   Number of Stairs to Enter Home 3   Number of Stairs Within Home 0   Living Environment Comment Patient lives with spouse, is a retired , wife works.   Self-Care   Usual Activity Tolerance good   Current Activity Tolerance moderate   Regular Exercise no   Equipment Currently Used at Home (own SEC and FWW)   Activity/Exercise/Self-Care Comment Patient previously used SEC for ambulation 2/2 chronic LLE weakness and neuropathy, does not use anymore   Functional Level Prior   Ambulation 0-->independent   Transferring 0-->independent   Toileting 0-->independent   Bathing 0-->independent   Dressing 0-->independent   Eating 0-->independent   Communication 0-->understands/communicates without difficulty   Swallowing 0-->swallows foods/liquids without difficulty   Cognition 0 - no cognition issues reported   Fall history within last six months no   Which of the above functional risks had a recent onset or change? none   Prior Functional Level Comment Patient was IND with all functional mobility and ADLs.   General Information   Onset of Illness/Injury or Date of Surgery - Date 03/30/18   Referring Physician Nimco Frank MD   Patient/Family Goals Statement to return home   Pertinent History of Current Problem (include personal factors and/or comorbidities that impact the POC) 72 year old male with chronic EtOH pancreatitis, h/o IPMN s/p resection in 2016 with persistent panc tail leak who underwent ERCP with sphincterotomy and stent placed to common bile duct and pancreatic duct on 3/29 presented with acute pancreatitis   Precautions/Limitations no known precautions/limitations   General Observations Patient greeted sitting up in chair, agreeable to PT, RN ok'd session   General Info Comments Activity: ambulate   Cognitive Status Examination   Orientation  "orientation to person, place and time   Level of Consciousness alert   Pain Assessment   Patient Currently in Pain No   Integumentary/Edema   Integumentary/Edema no deficits were identifed   Range of Motion (ROM)   ROM Comment BLE WFL   Strength   Strength Comments BLE WFL   Bed Mobility   Bed Mobility Comments Not assessed   Transfer Skills   Transfer Comments sit<>stand with CGA   Gait   Gait Comments Patient ambulates with SBA, with and without 1 UE support on IV pole   Balance   Balance no deficits were identified   Sensory Examination   Sensory Perception Comments ROSANGELA LE peripheral neuropathy- pain in feet during ambulation   General Therapy Interventions   Planned Therapy Interventions gait training;risk factor education;home program guidelines;progressive activity/exercise;transfer training   Clinical Impression   Criteria for Skilled Therapeutic Intervention yes, treatment indicated   PT Diagnosis impaired functional mobility   Influenced by the following impairments deconditioning, peripheral neuropathy, decreased activity tolerance   Functional limitations due to impairments decreased functional endurance for daily activities and ambulation   Clinical Presentation Stable/Uncomplicated   Clinical Presentation Rationale clinical judgement   Clinical Decision Making (Complexity) Low complexity   Therapy Frequency` (1x treatment)   Predicted Duration of Therapy Intervention (days/wks) (1x treatment)   Anticipated Discharge Disposition Home   Risk & Benefits of therapy have been explained Yes   Patient, Family & other staff in agreement with plan of care Yes   [facility] [employer] \"6 Clicks\"   2016, Trustees of [facility], under license to [employer].  All rights reserved.   6 Clicks Short Forms Basic Mobility Inpatient Short Form   [facility] [employer]  \"6 Clicks\" V.2 Basic Mobility Inpatient Short Form   1. Turning from your back to your side while in a flat bed without using bedrails? 4 - " None   2. Moving from lying on your back to sitting on the side of a flat bed without using bedrails? 4 - None   3. Moving to and from a bed to a chair (including a wheelchair)? 4 - None   4. Standing up from a chair using your arms (e.g., wheelchair, or bedside chair)? 4 - None   5. To walk in hospital room? 4 - None   6. Climbing 3-5 steps with a railing? 4 - None   Basic Mobility Raw Score (Score out of 24.Lower scores equate to lower levels of function) 24   Total Evaluation Time   Total Evaluation Time (Minutes) 5

## 2018-04-02 NOTE — PROVIDER NOTIFICATION
Text Paged [facility] Crosscover regarding pt's HR in between 130's-160's after Metoprolol IV given.  responded and seen pt with order for stat labs and Lasix IV

## 2018-04-02 NOTE — PLAN OF CARE
Problem: Patient Care Overview  Goal: Plan of Care/Patient Progress Review  Outcome: No Change  Alert and oriented x 4. Calls appropriately. Up to the bathroom on standby assist. Denies pain. Was on A fib with RVR early this evening. Metoprolol IV given by Flonohemi RN. Referred back for increasing HR , Dr. Ward came to check pt with orders for stat labs. Magnesium and  phosphate replaced overnight. BG and vital signs monitored overnight. Pt noted with HR in the 110's - 130's. On continuous tele monitor. Will continue to monitor and follow plan of care.

## 2018-04-02 NOTE — PROGRESS NOTES
[facility] - Medical Student Progress Note  Date of Service: 04/02/2018    ***REFRESH BEFORE SIGNING AND CHANGE DATE ABOVE    Patient: Antoine Russo  MRN: [id number]  Admission Date: 3/30/2018  Hospital Day # 2    Changes Today 04/02/18 :  - Full Liquid diet  - Restart warfarin  - Zosyn till GI says to stop  - Trend QD LFTs BMP CBC   - Diurese d/t likely fluid overload given increased intake and JVP, Crackles on exam- asymptomatic otherwise         Assessment and Plan:     Mr. 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 Had been drinking 4-5 drinks per day until January. He has had 3 hospitalizations since January for pancreatitis. 3/29 he underwent an ERCP w/ sphincterotomy. Then on 3/31 he presented with extreme abdominal pain and nausea w/ bilious vomiting after eating a large breakfast of mini tacos.    #Pancreatitis, recurrent, s/p ERCP  #Lactic Acidosis w/ c/f sepsis resolved- 1.9 most recently  #Abdominal pain, Nausea  Had an IPMN that required distal pancreatectomy and splenectomy in March of '16. This was complicated by a pancreatic leak that required drain placement, an intraabdominal abscess, and a pancreatic duct stent placed May of '16. Had been well until his recent stint of 3 hospitalizations, the first being 1/2-1/3 for EtOH pancreatitis. Admitted to the ICU for concerns of sepsis given his presentation and lactate of 4.8 peaking up to 14.7 initially, has resolved to 1.9 as of 3/31 . GI is following and recommends f/u in 3 weeks with an XR to confirm stent placement, and abx until BCx negative for 48hrs. Thank you GI for your work with Mr. Russo.    - GI following- f/u 3weeks, Zosyn until GI decides to stop  - Adequate fluids PO  - Monitor I/o's  - If necessary PRN PO dilaudid  - Full liquid  - QD CMP,  CBC, Mg, Phos, Ca- repleted last night  - Cont zosyn until 48hrs after negative BCx  - f/u 3 wks post procedure with XR for GI.    #A-fib w/ RVR  Triggered sepsis last night 1730 w/ HR of 140. Ekg showed Afib with RVR. Was given 5mg Metoprolol PRN, lasix 20mg given. HDS BP of 120/55. Patient was asymptomatic otherwise.  - PTA metoprolol tartrate 50mg q12, 5mg IV PRN  - on tele  - Restart warfarin, check INR QD    #c/f EtOH Abuse  Had been well, but was drinking 4-5 drinks per day leading up to his hospitalization.  -CD consult    #c/f MARGARET- 2/2 inflammation- resolved  Cr was 0.7-8, up to 1.29 on admission 3/30, but back down to 1 the next day, and near normal 4/1.  -CTM, NTD     #T2DM w/ neuropathy  Last HgbA1c 7.7% on 3/16  - PTA Gabapentin 600mg QHS  - PTA Glipizide   - PTA Metformin    #HTN #CAD #HLD #PVD  Also on metoprolol for Afib- see above.  - PTA Lisinopril 10mg    #BPH  Has not had any retention during current admission.  -PTA tamsulosin 0.4mg    #h/o Colectomy, w/ ileoanal anastomosis   - Monitor electrolytes/ hydration status    #h/o Oral SCC s/p glossectomy  Reports no dysphagia, decrease in function.  -NTD       FEN: PO Full liquid diet  PPx: DVT- SCM           GI- PRN miralax, senna  Disp: >1-2 days pending return to PO status, clinical improvement.  Code: Full     Riley Francois, MS3  [facility] [employer]  Pager [phone]     Interval History:   Reports feeling a lot better than the past couple days. Some mild returning hunger. Tolerating CLD. Pain and tenderness mainly in epigastic/periumbilical region, well controlled with minimal PRN dilaudid, minimal to no N/V. No diarrhea. Denies fever, chills, aches, bloody stools, BRBPR, or difficulty breathing.       Medications:     Current Facility-Administered Medications   Medication     Warfarin Therapy Reminder (Check START DATE - warfarin may be starting in the FUTURE)     [START ON 4/3/2018] metoprolol tartrate (LOPRESSOR) tablet 50 mg     warfarin (COUMADIN)  "tablet 5 mg     potassium phosphate (monobasic) (K-PHOS) tablet 500 mg     gabapentin (NEURONTIN) capsule 600 mg     naloxone (NARCAN) injection 0.1-0.4 mg     ondansetron (ZOFRAN-ODT) ODT tab 4 mg    Or     ondansetron (ZOFRAN) injection 4 mg     senna-docusate (SENOKOT-S;PERICOLACE) 8.6-50 MG per tablet 1 tablet    Or     senna-docusate (SENOKOT-S;PERICOLACE) 8.6-50 MG per tablet 2 tablet     piperacillin-tazobactam (ZOSYN) 3.375 g vial to attach to  mL bag     insulin aspart (NovoLOG) inj (RAPID ACTING)     glucose 40 % gel 15-30 g    Or     dextrose 50 % injection 25-50 mL    Or     glucagon injection 1 mg     HYDROmorphone (DILAUDID) half-tab 1-2 mg     lisinopril (PRINIVIL/ZESTRIL) tablet 10 mg     tamsulosin (FLOMAX) capsule 0.4 mg         Physical Exam:   Vitals were reviewed  Blood pressure 132/74, pulse 109, temperature 96.3  F (35.7  C), temperature source Oral, resp. rate 18, height 1.778 m (5' 10\"), weight 94.8 kg (209 lb), SpO2 93 %.    Intake/Output Summary (Last 24 hours) at 04/01/18 1346  Last data filed at 04/01/18 1300   Gross per 24 hour   Intake             3310 ml   Output             1475 ml   Net             1835 ml     Vitals:    03/31/18 0100 04/01/18 0300 04/02/18 0823   Weight: 90.3 kg (199 lb 1.2 oz) 94.2 kg (207 lb 10.8 oz) 94.8 kg (209 lb)       Physical Exam:  Gen: In no acute distress. Patient sitting up in bed.  HEENT: No scleral icterus, Moist mucous membranes. No nasal discharge.  CV: Elevated JVP. Normal rate, regular rhythm. S1/S2 normal.  No murmurs, rubs or gallops   Resp: Mild crackles worse in bilateral bases.  Abdomen: Soft, mildly tender abdomen worst in epigastrum, normal active bowel sounds,   Extremities: No peripheral edema, warm, capillary refill < 2 seconds  Skin: without rash or trauma  Neuro:   Mentation grossly intact, answers questions appropriately, CN II-xII grossly in tact.           Data:   ROUTINE LABS (Last four results)  CMP    Recent Labs  Lab " 04/02/18  0707 04/01/18  2120 04/01/18  0617 03/31/18  1401 03/31/18  0416  03/30/18 1957     --  139 140 143  --  140   POTASSIUM 4.3 3.6 4.0 4.2 4.4  --  4.6   CHLORIDE 104  --  106 109 106  --  102   CO2 22  --  27 25 18*  --  27   ANIONGAP 12  --  6 7 19*  --  11   *  --  166* 229* 258*  --  261*   BUN 13  --  13 15 19  --  25   CR 0.89  --  0.86 0.94 1.07  < > 1.29*   GFRESTIMATED 85  --  87 79 68  < > 55*   GFRESTBLACK >90  --  >90 >90 83  < > 67   NILSON 8.2*  --  8.6 8.7 9.0  --  9.8   MAG 1.7 1.6 1.7 2.1 1.5*  < >  --    PHOS 1.9* 1.2* 1.8*  --  4.9*  --   --    PROTTOTAL 5.7*  --  5.8*  --  7.0  --  7.9   ALBUMIN 1.8*  --  2.1*  --  2.9*  --  3.4   BILITOTAL 2.8*  --  3.0*  --  1.0  --  0.8   ALKPHOS 46  --  40  --  44  --  48   AST 17  --  14  --  9  --  21   ALT 14  --  13  --  16  --  22   < > = values in this interval not displayed.  CBC    Recent Labs  Lab 04/02/18  0707 04/01/18  0617 03/31/18 0416 03/31/18  0140 03/30/18 1957   WBC 29.9* 33.9* 19.9*  --  29.2*   RBC 3.69* 4.05* 4.66  --  4.82   HGB 12.0* 13.1* 15.4  --  15.8   HCT 36.5* 41.4 48.6  --  48.4   MCV 99 102* 104*  --  100   MCH 32.5 32.3 33.0  --  32.8   MCHC 32.9 31.6 31.7  --  32.6   RDW 15.4* 15.8* 15.6*  --  15.1*    186 232 214 249     INR    Recent Labs  Lab 03/31/18  0416 03/29/18  1418   INR 1.14 1.10     Arterial Blood Gas    Recent Labs  Lab 03/31/18  0420 03/31/18  0400   PH 7.29*  --    PCO2 31*  --    PO2 115*  --    HCO3 15*  --    O2PER 6L 6L     I&O's: I/O last 3 completed shifts:  In: 3603.34 [P.O.:530; I.V.:3073.34]  Out: 2775 [Urine:2775]

## 2018-04-02 NOTE — PHARMACY-ANTICOAGULATION SERVICE
Clinical Pharmacy - Warfarin Dosing Consult     Pharmacy has been consulted to manage this patient s warfarin therapy.  Indication: Atrial Fibrillation  Therapy Goal: INR 2-3  Warfarin Prior to Admission: Yes  Warfarin PTA Regimen: 1.25 mg (2.5 mg x 0.5) on Tue, Sat; 2.5 mg (2.5 mg x 1) all other days  Significant drug interactions: N/A  Recent documented change in oral intake/nutrition: Unknown  Dose Comments: Loading dose    INR   Date Value Ref Range Status   03/31/2018 1.14 0.86 - 1.14 Final   03/29/2018 1.10 0.86 - 1.14 Final       Recommend warfarin 5 mg today.  Pharmacy will monitor Antoine Russo daily and order warfarin doses to achieve specified goal.      Please contact pharmacy as soon as possible if the warfarin needs to be held for a procedure or if the warfarin goals change.

## 2018-04-03 LAB
ALBUMIN SERPL-MCNC: 2.2 G/DL (ref 3.4–5)
ALP SERPL-CCNC: 58 U/L (ref 40–150)
ALT SERPL W P-5'-P-CCNC: 12 U/L (ref 0–70)
ANION GAP SERPL CALCULATED.3IONS-SCNC: 11 MMOL/L (ref 3–14)
AST SERPL W P-5'-P-CCNC: 19 U/L (ref 0–45)
BILIRUB SERPL-MCNC: 1.7 MG/DL (ref 0.2–1.3)
BUN SERPL-MCNC: 15 MG/DL (ref 7–30)
CA-I BLD-MCNC: 4.3 MG/DL (ref 4.4–5.2)
CALCIUM SERPL-MCNC: 9 MG/DL (ref 8.5–10.1)
CHLORIDE SERPL-SCNC: 105 MMOL/L (ref 94–109)
CO2 SERPL-SCNC: 23 MMOL/L (ref 20–32)
CREAT SERPL-MCNC: 0.84 MG/DL (ref 0.66–1.25)
ERYTHROCYTE [DISTWIDTH] IN BLOOD BY AUTOMATED COUNT: 15.3 % (ref 10–15)
GFR SERPL CREATININE-BSD FRML MDRD: 90 ML/MIN/1.7M2
GLUCOSE BLDC GLUCOMTR-MCNC: 150 MG/DL (ref 70–99)
GLUCOSE BLDC GLUCOMTR-MCNC: 163 MG/DL (ref 70–99)
GLUCOSE BLDC GLUCOMTR-MCNC: 165 MG/DL (ref 70–99)
GLUCOSE BLDC GLUCOMTR-MCNC: 184 MG/DL (ref 70–99)
GLUCOSE BLDC GLUCOMTR-MCNC: 192 MG/DL (ref 70–99)
GLUCOSE BLDC GLUCOMTR-MCNC: 226 MG/DL (ref 70–99)
GLUCOSE SERPL-MCNC: 181 MG/DL (ref 70–99)
HCT VFR BLD AUTO: 40.1 % (ref 40–53)
HGB BLD-MCNC: 12.9 G/DL (ref 13.3–17.7)
INR PPP: 1.23 (ref 0.86–1.14)
LACTATE BLD-SCNC: 1.7 MMOL/L (ref 0.4–1.9)
MAGNESIUM SERPL-MCNC: 1.8 MG/DL (ref 1.6–2.3)
MAGNESIUM SERPL-MCNC: 2.1 MG/DL (ref 1.6–2.3)
MCH RBC QN AUTO: 32.2 PG (ref 26.5–33)
MCHC RBC AUTO-ENTMCNC: 32.2 G/DL (ref 31.5–36.5)
MCV RBC AUTO: 100 FL (ref 78–100)
PHOSPHATE SERPL-MCNC: 1.5 MG/DL (ref 2.5–4.5)
PHOSPHATE SERPL-MCNC: 2 MG/DL (ref 2.5–4.5)
PLATELET # BLD AUTO: 234 10E9/L (ref 150–450)
POTASSIUM SERPL-SCNC: 3.9 MMOL/L (ref 3.4–5.3)
POTASSIUM SERPL-SCNC: 4 MMOL/L (ref 3.4–5.3)
PROT SERPL-MCNC: 6.6 G/DL (ref 6.8–8.8)
RBC # BLD AUTO: 4.01 10E12/L (ref 4.4–5.9)
SODIUM SERPL-SCNC: 140 MMOL/L (ref 133–144)
WBC # BLD AUTO: 24 10E9/L (ref 4–11)

## 2018-04-03 PROCEDURE — 25000125 ZZHC RX 250: Performed by: STUDENT IN AN ORGANIZED HEALTH CARE EDUCATION/TRAINING PROGRAM

## 2018-04-03 PROCEDURE — 12000008 ZZH R&B INTERMEDIATE UMMC

## 2018-04-03 PROCEDURE — 36415 COLL VENOUS BLD VENIPUNCTURE: CPT | Performed by: INTERNAL MEDICINE

## 2018-04-03 PROCEDURE — 25000132 ZZH RX MED GY IP 250 OP 250 PS 637: Performed by: STUDENT IN AN ORGANIZED HEALTH CARE EDUCATION/TRAINING PROGRAM

## 2018-04-03 PROCEDURE — 84100 ASSAY OF PHOSPHORUS: CPT | Performed by: INTERNAL MEDICINE

## 2018-04-03 PROCEDURE — 25000132 ZZH RX MED GY IP 250 OP 250 PS 637: Performed by: INTERNAL MEDICINE

## 2018-04-03 PROCEDURE — 80053 COMPREHEN METABOLIC PANEL: CPT | Performed by: INTERNAL MEDICINE

## 2018-04-03 PROCEDURE — 85610 PROTHROMBIN TIME: CPT | Performed by: INTERNAL MEDICINE

## 2018-04-03 PROCEDURE — 00000146 ZZHCL STATISTIC GLUCOSE BY METER IP

## 2018-04-03 PROCEDURE — 82565 ASSAY OF CREATININE: CPT | Performed by: INTERNAL MEDICINE

## 2018-04-03 PROCEDURE — 83735 ASSAY OF MAGNESIUM: CPT | Performed by: INTERNAL MEDICINE

## 2018-04-03 PROCEDURE — 99233 SBSQ HOSP IP/OBS HIGH 50: CPT | Performed by: INTERNAL MEDICINE

## 2018-04-03 PROCEDURE — 85027 COMPLETE CBC AUTOMATED: CPT | Performed by: INTERNAL MEDICINE

## 2018-04-03 PROCEDURE — 84132 ASSAY OF SERUM POTASSIUM: CPT | Performed by: INTERNAL MEDICINE

## 2018-04-03 PROCEDURE — 83605 ASSAY OF LACTIC ACID: CPT | Performed by: INTERNAL MEDICINE

## 2018-04-03 PROCEDURE — 82330 ASSAY OF CALCIUM: CPT | Performed by: INTERNAL MEDICINE

## 2018-04-03 PROCEDURE — 25000128 H RX IP 250 OP 636: Performed by: STUDENT IN AN ORGANIZED HEALTH CARE EDUCATION/TRAINING PROGRAM

## 2018-04-03 RX ORDER — FUROSEMIDE 10 MG/ML
20 INJECTION INTRAMUSCULAR; INTRAVENOUS ONCE
Status: CANCELLED | OUTPATIENT
Start: 2018-04-03 | End: 2018-04-03

## 2018-04-03 RX ORDER — FUROSEMIDE 10 MG/ML
20 INJECTION INTRAMUSCULAR; INTRAVENOUS ONCE
Status: COMPLETED | OUTPATIENT
Start: 2018-04-03 | End: 2018-04-03

## 2018-04-03 RX ORDER — WARFARIN SODIUM 2.5 MG/1
2.5 TABLET ORAL
Status: COMPLETED | OUTPATIENT
Start: 2018-04-03 | End: 2018-04-03

## 2018-04-03 RX ORDER — POTASSIUM CHLORIDE 7.45 MG/ML
10 INJECTION INTRAVENOUS
Status: DISCONTINUED | OUTPATIENT
Start: 2018-04-03 | End: 2018-04-04 | Stop reason: HOSPADM

## 2018-04-03 RX ORDER — POTASSIUM CHLORIDE 750 MG/1
20-40 TABLET, EXTENDED RELEASE ORAL
Status: DISCONTINUED | OUTPATIENT
Start: 2018-04-03 | End: 2018-04-04 | Stop reason: HOSPADM

## 2018-04-03 RX ORDER — POTASSIUM CHLORIDE 1.5 G/1.58G
20-40 POWDER, FOR SOLUTION ORAL
Status: DISCONTINUED | OUTPATIENT
Start: 2018-04-03 | End: 2018-04-04 | Stop reason: HOSPADM

## 2018-04-03 RX ORDER — POTASSIUM CHLORIDE 29.8 MG/ML
20 INJECTION INTRAVENOUS
Status: DISCONTINUED | OUTPATIENT
Start: 2018-04-03 | End: 2018-04-04 | Stop reason: HOSPADM

## 2018-04-03 RX ORDER — POTASSIUM CL/LIDO/0.9 % NACL 10MEQ/0.1L
10 INTRAVENOUS SOLUTION, PIGGYBACK (ML) INTRAVENOUS
Status: DISCONTINUED | OUTPATIENT
Start: 2018-04-03 | End: 2018-04-04 | Stop reason: HOSPADM

## 2018-04-03 RX ORDER — MAGNESIUM SULFATE HEPTAHYDRATE 40 MG/ML
4 INJECTION, SOLUTION INTRAVENOUS EVERY 4 HOURS PRN
Status: DISCONTINUED | OUTPATIENT
Start: 2018-04-03 | End: 2018-04-04 | Stop reason: HOSPADM

## 2018-04-03 RX ADMIN — INSULIN ASPART 1 UNITS: 100 INJECTION, SOLUTION INTRAVENOUS; SUBCUTANEOUS at 12:28

## 2018-04-03 RX ADMIN — LISINOPRIL 10 MG: 5 TABLET ORAL at 08:02

## 2018-04-03 RX ADMIN — PIPERACILLIN AND TAZOBACTAM 3.38 G: 3; .375 INJECTION, POWDER, LYOPHILIZED, FOR SOLUTION INTRAVENOUS; PARENTERAL at 10:40

## 2018-04-03 RX ADMIN — TAMSULOSIN HYDROCHLORIDE 0.4 MG: 0.4 CAPSULE ORAL at 08:02

## 2018-04-03 RX ADMIN — METOPROLOL TARTRATE 50 MG: 50 TABLET ORAL at 20:04

## 2018-04-03 RX ADMIN — POTASSIUM CHLORIDE 20 MEQ: 750 TABLET, EXTENDED RELEASE ORAL at 21:14

## 2018-04-03 RX ADMIN — METOPROLOL TARTRATE 50 MG: 50 TABLET ORAL at 08:01

## 2018-04-03 RX ADMIN — POTASSIUM PHOSPHATE, MONOBASIC 500 MG: 500 TABLET, SOLUBLE ORAL at 21:14

## 2018-04-03 RX ADMIN — WARFARIN SODIUM 2.5 MG: 2.5 TABLET ORAL at 17:29

## 2018-04-03 RX ADMIN — INSULIN ASPART 1 UNITS: 100 INJECTION, SOLUTION INTRAVENOUS; SUBCUTANEOUS at 00:36

## 2018-04-03 RX ADMIN — POTASSIUM PHOSPHATE, MONOBASIC AND POTASSIUM PHOSPHATE, DIBASIC 15 MMOL: 224; 236 INJECTION, SOLUTION INTRAVENOUS at 22:44

## 2018-04-03 RX ADMIN — POTASSIUM PHOSPHATE, MONOBASIC 500 MG: 500 TABLET, SOLUBLE ORAL at 08:01

## 2018-04-03 RX ADMIN — POTASSIUM PHOSPHATE, MONOBASIC 500 MG: 500 TABLET, SOLUBLE ORAL at 17:29

## 2018-04-03 RX ADMIN — Medication 2 G: at 21:15

## 2018-04-03 RX ADMIN — POTASSIUM PHOSPHATE, MONOBASIC 500 MG: 500 TABLET, SOLUBLE ORAL at 11:51

## 2018-04-03 RX ADMIN — GABAPENTIN 600 MG: 300 CAPSULE ORAL at 21:14

## 2018-04-03 RX ADMIN — POTASSIUM CHLORIDE 20 MEQ: 750 TABLET, EXTENDED RELEASE ORAL at 11:51

## 2018-04-03 RX ADMIN — PIPERACILLIN AND TAZOBACTAM 3.38 G: 3; .375 INJECTION, POWDER, LYOPHILIZED, FOR SOLUTION INTRAVENOUS; PARENTERAL at 04:17

## 2018-04-03 RX ADMIN — POTASSIUM PHOSPHATE, MONOBASIC AND POTASSIUM PHOSPHATE, DIBASIC 20 MMOL: 224; 236 INJECTION, SOLUTION INTRAVENOUS at 12:25

## 2018-04-03 RX ADMIN — INSULIN ASPART 1 UNITS: 100 INJECTION, SOLUTION INTRAVENOUS; SUBCUTANEOUS at 08:03

## 2018-04-03 RX ADMIN — INSULIN ASPART 1 UNITS: 100 INJECTION, SOLUTION INTRAVENOUS; SUBCUTANEOUS at 04:17

## 2018-04-03 RX ADMIN — FUROSEMIDE 20 MG: 10 INJECTION, SOLUTION INTRAVENOUS at 10:40

## 2018-04-03 NOTE — PLAN OF CARE
Problem: Patient Care Overview  Goal: Plan of Care/Patient Progress Review  Outcome: Improving  [id number]: VSS on room air- pt used 2L O2 per nc intermittently throughout the day. Sats in 90's. -115- md's aware. Pt asymptomatic tele- [id number]-[id number]. Pt has generalized pain- uses repositioning to relieve pain. Pt denies n/v. Pt has PIV TKO with 5ml NS with Zosyn (q 6hrs). IV lasix given x2 with good urine output. Pt had loose bm today with urinations. Pt tolerating Full Liquids. Calorie Count updated. BG managed with ss. Metoprolol changed this afternoon. Pt has generalized edema/fluid up. Plan for diuresis today-tomorrow. Calls appropriately. Will continue to follow POC/monitor. MIVF discontinued today- sepsis protocol triggered Lactic Acid 1.3

## 2018-04-03 NOTE — PLAN OF CARE
Problem: Patient Care Overview  Goal: Plan of Care/Patient Progress Review  Outcome: No Change  1900-0730: A&Ox4, VSS on RA- pt using 2L NC overnight for comfort. O2 sats in high 90s. -115. Tele in place. Tachycardic with a fib. Started on warfarin yesterday. Calls appropriately. Pt denies pain/nausea. Full liquid diet, calorie counts, tolerating well. BG q4h, see MAR for SSI given. PIV x2, 1st infusing TKO with intermittent abx and 2nd SL, patent. Adequate urine output, tyrone in color. Pt had multiple loose green BMs overnight, pt states it is d/t magnesium replacement. Mag 1.8, K 3.6, one time replacements ordered and given. Rechecks in am. Will continue to monitor and follow POC.

## 2018-04-03 NOTE — PROGRESS NOTES
Calorie Counts  Intake recorded for: 4/2 Kcals: 270  Protein: 12g  # Meals Recorded: 100% milk, ice cream, 50% tomato soup, beef broth   # Supplements Recorded: 0

## 2018-04-03 NOTE — PROGRESS NOTES
"[facility]    Pancreaticobiliary Service Follow-up    CC: Abdominal pain     Dx: Distal pancreatectomy for IPMN   Chronic pancreatitis    Post ERCP pancreatitis.      24 hour events: No events overnight.  Initiated on anticoagulation for atrial fibrillation.  Fluids discontinued, diuresing.  Tolerating full liquid diet.      Subjective:  Denies pain.  Anxious to advance diet.  Feels much better overall.      Medications  Current Facility-Administered Medications   Medication Dose Route Frequency     warfarin  2.5 mg Oral ONCE at 18:00     insulin aspart  1-6 Units Subcutaneous TID w/meals     insulin aspart  1-5 Units Subcutaneous At Bedtime     metoprolol tartrate  50 mg Oral BID     potassium phosphate (monobasic)  500 mg Oral 4x Daily w/meals     gabapentin  600 mg Oral At Bedtime     lisinopril  10 mg Oral Daily     tamsulosin  0.4 mg Oral Daily       Review of systems  A 10-point review of systems was negative.    Examination  /70 (BP Location: Right arm)  Pulse 109  Temp 97.6  F (36.4  C) (Oral)  Resp 16  Ht 1.778 m (5' 10\")  Wt 92.4 kg (203 lb 9.6 oz)  SpO2 92%  BMI 29.21 kg/m2    Intake/Output Summary (Last 24 hours) at 04/03/18 1641  Last data filed at 04/03/18 1635   Gross per 24 hour   Intake              480 ml   Output             3925 ml   Net            -3445 ml       Gen- well, NAD, A+Ox3, normal color  CVS- RRR  RS- EWOB  Abd- soft, NT, ND  Extr- warm and well perfused, improved lower extremity edema  Neuro- grossly intact, no focal deficits  Skin- no rash    Laboratory  Lab Results   Component Value Date     04/03/2018    POTASSIUM 3.9 04/03/2018    CHLORIDE 105 04/03/2018    CO2 23 04/03/2018    BUN 15 04/03/2018    CR 0.84 04/03/2018       Lab Results   Component Value Date    BILITOTAL 1.7 04/03/2018    ALT 12 04/03/2018    AST 19 04/03/2018    ALKPHOS 58 04/03/2018       Lab Results   Component Value Date    WBC 24.0 04/03/2018    HGB 12.9 " 04/03/2018     04/03/2018     04/03/2018       Lab Results   Component Value Date    INR 1.23 04/03/2018       Radiology  No new imaging     Assessment  72 year old with a history of chronic pancreatitis, distal pancreatectomy for IPMN complicated by PD leak status post pancreatic stent 5/2016, initially did well but went on to develop recurrent acute pancreatitis over the last several months. CT demonstrates ?slowly enlarging pseudocyst off the tail of the pancreas.  Underwent repeat ERCP on 3/29/2018 for decompression of the PD and resolution of pancreatic tail leak, status post PD stent, CBD stent, sphincterotomy.  Now admitted with post-ERCP pancreatitis with significant patric-pancreatic mesenteric edema, no necrosis.  BISAP 2-3.  Improving with supportive care.      Recommendations  -advance diet as tolerated   -stop antibiotics   -trend liver tests  -abdominal X-ray in 3 weeks to confirm stents still present followed by EGD in J-unit to remove     Case discussed with Dr. Beasley who is in agreement with plan of care.  Please page with ? Or change in clinical status.     Indy Andrade MD  Pancreaticobiliary Fellow   [phone]

## 2018-04-03 NOTE — PROGRESS NOTES
[facility] - Medical Student Progress Note  Date of Service: 04/03/2018    Patient: Antoine Russo  MRN: [id number]  Admission Date: 3/30/2018  Hospital Day # 3    Changes Today 04/03/18 :  - Regular low fat diet, ADAT  - Per nutrition: >1650 kcal, >80g Protein per day   - Continue Zosyn awaiting GI recs  - Trend QD LFTs BMP CBC   - Diurese with Lasix 20mg IV x1. (overload has improved)  - Possible D/c today pending IV vs PO abx, and tolerating regular diet.         Assessment and Plan:     Mr. 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 Had been drinking 4-5 drinks per day until January. He has had 3 hospitalizations since January for pancreatitis. 3/29 he underwent an ERCP w/ sphincterotomy. Then on 3/31 he presented with extreme abdominal pain and nausea w/ bilious vomiting after eating a large breakfast of mini tacos consistent w post ERCP pancreatitis.    #Pancreatitis, recurrent, s/p ERCP 3/29  #Lactic Acidosis, resolved  #Abdominal pain, Nausea  Had an IPMN that required distal pancreatectomy and splenectomy in March of '16. This was complicated by a pancreatic leak that required drain placement, an intraabdominal abscess, and a pancreatic duct stent placed May of '16. Had been well until his recent stint of 3 hospitalizations, the first being 1/2-1/3 for EtOH pancreatitis. Currently admitted with pancreatitis after ERCP sphincterotomy 3/29. Originally was in ICU for concerns of sepsis given his presentation and lactate of 4.8 peaking up to 14.7, has resolved to 1.9 as of 3/31 . GI is following and recommends f/u in 3 weeks with an Abd XR to confirm stent placement, and cont zosyn. Thank you GI for your work with Mr. Russo.    - Nutrition consulted 4/2 rec >1650 robles, >80g protein per day  - GI following- f/u OP  in 3 weeks  - Zosyn until GI decides to stop, appreciate recs  - Adequate fluids PO  - Monitor I/o's  - PRN Dilaudid if pain recurs  - Start regular low fat diet today, ADAT  - QD CMP, CBC, Mg, Phos, Ca- repleted last night    #Fluid Overload  Crackles on exam yesterday, and swelling noted in feet. These have improved today and he was net -1738 ml yesterday. Will do one more dose and reassess. Likely will not need to go home with any.  - Lasix 20mg IV x 1, then reassess fluid status  - off oxygen today    #A-fib w/ RVR  Heart rate has been  baseline since admission. Triggered sepsis protocol 4/1 w/ HR of 140. Ekg showed Afib with RVR. Was given 5mg Metoprolol PRN, lasix 20mg given. HDS BP of 120/55. Patient has been asymptomatic otherwise.  - resumed on PTA metoprolol tartrate 50mg q12, 5mg IV PRN  - on tele  - Restart warfarin, check INR QD     #c/f EtOH Abuse  Had been well, but was drinking 4-5 drinks per day leading up to his recent recurrent episodes of pancreatitis. Will discuss the dangers of alcohol with pancreatitis.  - Consider CD consult?    #c/f MARGARET- 2/2 inflammation- resolved  Cr was 0.7-8, up to 1.29 on admission 3/30, but back down to 1 the next day, and near normal 4/1.  -CTM, NTD     #T2DM w/ neuropathy  Last HgbA1c 7.7% on 3/16  - currently on Sliding scale insulin  - PTA Gabapentin 600mg QHS  - PTA Glipizide and metformin: currently held but anticipate resuming them on DC    #HTN #CAD #HLD #PVD  Also on metoprolol for Afib- see above.  - PTA Lisinopril 10mg    #BPH  Has not had any retention during current admission.  -PTA tamsulosin 0.4mg    #h/o Colectomy, w/ ileoanal anastomosis   Has diarrhea at baseline.  - Monitor electrolytes/ hydration status as needed    #h/o Oral SCC s/p glossectomy  Reports no dysphagia, decrease in function.  -NTD       FEN: PO Regular Low fat diet. ADAT.  PPx: DVT- Ambulate, on warfarin.           GI- PRN miralax, senna  Disp: This afternoon or tomorrow.  Barriers include: IV abx, return to near baseline PO status.  Code: Full     Scribe Disclosure:   I, Riley Francois, am serving as a scribe; to document services personally performed by evelyn ling MD -based on data collection and the provider's statements to me.     Provider Disclosure:  I agree with above History, Review of Systems, Physical exam and Plan.  I have reviewed the content of the documentation and have edited it as needed. I have personally performed the services documented here and the documentation accurately represents those services and the decisions I have made.      Riley Francois, MS3  [facility] [employer]  Pager [phone]     Interval History:   Reports feeling a lot better. Feels hungry, tolerating CLD- asked about regular diet. Minimal to no abdominal discomfort. No nausea, vomiting, abnormal stools, urinary retention, dysuria. Denies fever, chills, BRBPR, cough or difficulty breathing. No chest pain, dizziness, has not been symptomatic while in Afib.    ROS: complete 4 point ROS reviewed and negative other than stated above.       Medications:     Current Facility-Administered Medications   Medication     potassium chloride SA (K-DUR/KLOR-CON M) CR tablet 20-40 mEq     potassium chloride (KLOR-CON) Packet 20-40 mEq     potassium chloride 10 mEq in 100 mL sterile water intermittent infusion (premix)     potassium chloride 10 mEq in 100 mL intermittent infusion with 10 mg lidocaine     potassium chloride 20 mEq in 50 mL intermittent infusion     magnesium sulfate 2 g in NS intermittent infusion (PharMEDium or FV Cmpd)     magnesium sulfate 4 g in 100 mL sterile water (premade)     potassium phosphate 10 mmol in D5W 250 mL intermittent infusion     potassium phosphate 15 mmol in D5W 250 mL intermittent infusion     potassium phosphate 20 mmol in D5W 500 mL intermittent infusion     potassium phosphate 20 mmol in D5W 250 mL intermittent infusion     potassium phosphate 25 mmol in D5W 500 mL intermittent  "infusion     warfarin (COUMADIN) tablet 2.5 mg     Warfarin Therapy Reminder (Check START DATE - warfarin may be starting in the FUTURE)     metoprolol tartrate (LOPRESSOR) tablet 50 mg     potassium phosphate (monobasic) (K-PHOS) tablet 500 mg     gabapentin (NEURONTIN) capsule 600 mg     naloxone (NARCAN) injection 0.1-0.4 mg     ondansetron (ZOFRAN-ODT) ODT tab 4 mg    Or     ondansetron (ZOFRAN) injection 4 mg     senna-docusate (SENOKOT-S;PERICOLACE) 8.6-50 MG per tablet 1 tablet    Or     senna-docusate (SENOKOT-S;PERICOLACE) 8.6-50 MG per tablet 2 tablet     piperacillin-tazobactam (ZOSYN) 3.375 g vial to attach to  mL bag     insulin aspart (NovoLOG) inj (RAPID ACTING)     glucose 40 % gel 15-30 g    Or     dextrose 50 % injection 25-50 mL    Or     glucagon injection 1 mg     HYDROmorphone (DILAUDID) half-tab 1-2 mg     lisinopril (PRINIVIL/ZESTRIL) tablet 10 mg     tamsulosin (FLOMAX) capsule 0.4 mg         Physical Exam:   Vitals were reviewed  Blood pressure 141/67, pulse 109, temperature 98.6  F (37  C), temperature source Oral, resp. rate 18, height 1.778 m (5' 10\"), weight 92.4 kg (203 lb 9.6 oz), SpO2 95 %.        Intake/Output Summary (Last 24 hours) at 04/03/18 1352  Last data filed at 04/03/18 1334   Gross per 24 hour   Intake              720 ml   Output             4225 ml   Net            -3505 ml       Vitals:    04/01/18 0300 04/02/18 0823 04/02/18 1600   Weight: 94.2 kg (207 lb 10.8 oz) 94.8 kg (209 lb) 92.4 kg (203 lb 9.6 oz)       Physical Exam:  Gen: In no acute distress. Patient sitting up in bed.  HEENT: No scleral icterus, Moist mucous membranes. No nasal discharge.  CV: Minimal JVP elevation apreciated. Normal rate, regular rhythm. S1/S2 normal.  No murmurs, rubs or gallops   Resp: Mild crackles in bilateral bases- significantly improved from previous exam.  Abdomen: Soft, mildly tender abdomen worst in epigastrum, normal active bowel sounds,   Extremities: No peripheral edema, " warm, capillary refill < 2 seconds  Skin: without rash or trauma  Neuro:   Mentation grossly intact, answers questions appropriately, CN II-xII grossly in tact.           Data:   ROUTINE LABS (Last four results)  CMP    Recent Labs  Lab 04/03/18 0838 04/02/18 2028 04/02/18 0707 04/01/18 2120 04/01/18 0617 03/31/18  1401 03/31/18 0416     --  139  --  139 140 143   POTASSIUM 3.9 3.6 4.3 3.6 4.0 4.2 4.4   CHLORIDE 105  --  104  --  106 109 106   CO2 23  --  22  --  27 25 18*   ANIONGAP 11  --  12  --  6 7 19*   *  --  175*  --  166* 229* 258*   BUN 15  --  13  --  13 15 19   CR 0.84  --  0.89  --  0.86 0.94 1.07   GFRESTIMATED 90  --  85  --  87 79 68   GFRESTBLACK >90  --  >90  --  >90 >90 83   NILSON 9.0  --  8.2*  --  8.6 8.7 9.0   MAG 2.1 1.8 1.7 1.6 1.7 2.1 1.5*   PHOS 1.5*  --  1.9* 1.2* 1.8*  --  4.9*   PROTTOTAL 6.6*  --  5.7*  --  5.8*  --  7.0   ALBUMIN 2.2*  --  1.8*  --  2.1*  --  2.9*   BILITOTAL 1.7*  --  2.8*  --  3.0*  --  1.0   ALKPHOS 58  --  46  --  40  --  44   AST 19  --  17  --  14  --  9   ALT 12  --  14  --  13  --  16     CBC    Recent Labs  Lab 04/03/18 0838 04/02/18 0707 04/01/18 0617 03/31/18 0416   WBC 24.0* 29.9* 33.9* 19.9*   RBC 4.01* 3.69* 4.05* 4.66   HGB 12.9* 12.0* 13.1* 15.4   HCT 40.1 36.5* 41.4 48.6    99 102* 104*   MCH 32.2 32.5 32.3 33.0   MCHC 32.2 32.9 31.6 31.7   RDW 15.3* 15.4* 15.8* 15.6*    193 186 232     INR    Recent Labs  Lab 04/03/18  0838 03/31/18  0416 03/29/18  1418   INR 1.23* 1.14 1.10

## 2018-04-03 NOTE — PLAN OF CARE
Problem: Patient Care Overview  Goal: Plan of Care/Patient Progress Review  Outcome: No Change  Time 1751-9951    Reason for admission: pancreatitis   Vitals: Stable on room air. Encouraging deep breathing to stay off O2. Tachycardic.   Activity: SBA with lines otherwise up ad guerrero. Declined ambulating in hallway.   Pain: Denies  Neuro: A/O x 4.     Cardiac: Tele shows a fib with HR mostly 100-110s today. HR 130s in evening after shower.   Respiratory: WDL. Weaned off O2.   GI/: Frequency of urination with IV lasix in morning. Pt reporting baseline loose stools from previous history of colectomy.   Diet: Tolerated full liquids in AM and advanced to low fat mid-morning. First low fat tray will be dinner as patient declined to order lunch. Calorie counts in place.   Lines: 2 PIVs. Pt likes to be unhooked when not running medications. IV antibiotics today and Phosphorus replacement for value of 1.5   Skin/Wounds: Tattoos and scabbing - otherwise seems intact. Some redness but patient reporting moving in bed often and sleeping on sides.   Endocrine: BGs 4 hours changed to before meals and HS. BGs >140 today - sliding scale insulin given as ordered.   Labs/imaging: Low phosphorus - replacement via PIV. Potassium replaced per protocol PO. Pt triggered sepsis protocol in evening for elevated HR and WBC. Lactic acid < 2 .         New changes this shift: Advanced diet. Weaned O2. Encouraging ambulation. IV antibiotics D/Arnulfo in afternoon. Showered. Wife visited.      Plan: Continue to monitor pain/diet advancement. Recheck Phosphorus level. Likely DC to home tomorrow.       Continue to monitor and follow POC

## 2018-04-03 NOTE — PROGRESS NOTES
"CLINICAL NUTRITION SERVICES - ASSESSMENT NOTE     Nutrition Prescription    RECOMMENDATIONS FOR MDs/PROVIDERS TO ORDER:  None at this time.  Continue to replace lytes as needed.    Malnutrition Status:    Patient does not meet two of the above criteria for diagnosing malnutrition, but is at risk of malnutrition    Recommendations already ordered by Registered Dietitian (RD):  PRN supplements    Future/Additional Recommendations:  Recommend patient be able to consistently consume at least 1650 kcal and 80g protein daily (~75% estimated low-end kcal and protein needs) vs need for additional nutrition intervention     Encourage patient to consume at least 75% of meals TID + 1-2 snacks per day, or the equivalents.  If consuming less than this offer scheduled supplements and/or scheduled snacks. Consider multivitamin with minerals if PO intake inadequate.      REASON FOR ASSESSMENT  Antoine Russo is a/an 72 year old male assessed by the dietitian for Provider Order - \"pancreatitis, malnutrition\"    NUTRITION HISTORY  Patient reports eating a regular diet PTA, denies any known food allergies/intolerances.  Says he was eating well/at baseline PTA until began experiencing abdominal pain after surgery on 3/30 (4 days ago).  At baseline pt eats 2 meals per day, says \"not healthy foods\" and drinks pop and water as beverages.  Says he has not drank alcohol since January.  Says he eats meat often and sometimes fruits and vegetables.      PMH includes chronic EtOH pancreatitis, T2DM, and recent ERCP with biliary and pancreatic stent placement on 3/29.  Hx of IPMN treated with distal pancreatectomy and splenectomy 1 year ago.    CURRENT NUTRITION ORDERS  Diet: Combination Regular and Low Fat Diet + Calorie Counts 4/2-4/4  Intake/Tolerance: Tolerated full liquids yesterday, says he is eager to eat solid food diet today. Was having nausea at beginning of admission.  Was NPO/clear liquids 3/31-4/2, then adv to full liquids.  " "Advanced to current diet order this morning.    LABS  Labs reviewed  - Phos 1.5 (L), has been low and being replaced since 4/1.  Mg++ and K+ WNL.    MEDICATIONS  Medications reviewed  - K-Phos (500 mg QID)    ANTHROPOMETRICS  Height: 177.8 cm (5' 10\")  Most Recent Weight: 92.4 kg (203 lb 9.6 oz)    IBW: 75.5 kg   BMI: Overweight BMI 25-29.9  Weight History: Pt denies any recent unintentional weight changes, says his UBW is 190-195#.  Wt Readings from Last 10 Encounters:   04/02/18 92.4 kg (203 lb 9.6 oz)   03/30/18 90.7 kg (200 lb)   03/29/18 91.1 kg (200 lb 13.4 oz)   03/16/18 89.8 kg (198 lb)   03/07/18 90.6 kg (199 lb 11.2 oz)   02/28/18 87.3 kg (192 lb 7.4 oz)   01/30/18 92.1 kg (203 lb)   01/30/18 90.3 kg (199 lb)   01/22/18 90.7 kg (200 lb)   01/09/18 92.4 kg (203 lb 9.6 oz)      Dosing Weight: 90 kg (actual/lowest wt this admit of 90.3 kg on 3/31)    ASSESSED NUTRITION NEEDS  Estimated Energy Needs: 8744-1523 kcals/day (25 - 30 kcals/kg)  Justification: Maintenance  Estimated Protein Needs: 108-135 grams protein/day (1.2 - 1.5 grams of pro/kg)  Justification: Hypercatabolism with acute illness  Estimated Fluid Needs: (1 mL/kcal)   Justification: Maintenance, or other per provider pending fluid status    PHYSICAL FINDINGS  See malnutrition section below.    MALNUTRITION  % Intake: Decreased intake does not meet criteria  % Weight Loss: None noted  Subcutaneous Fat Loss: None observed  Muscle Loss: Upper arm (bicep, tricep):  Mild/moderate(could be age related component as well?)  Fluid Accumulation/Edema: Mild BLE per provider note yesterday (per chart, pt with Afib history and is currently volume overloaded, does not meet criteria)  Malnutrition Diagnosis: Patient does not meet two of the above criteria for diagnosing malnutrition, but is at risk of malnutrition    NUTRITION DIAGNOSIS  Inadequate oral intake related to limited diet advancement since admission and reduced PO 2/2 pancreatitis related abdominal " pain x 1 day PTA as evidenced by NPO/clear liquids x 2 days, full liquids yesterday, and only recent diet advancement to low fat diet this morning      INTERVENTIONS  Implementation  Nutrition Education: Provided education on role of RD and nutrtion POC.  Provided verbal and written diet education to patient on low diet diet management for pancreatitis; provided handouts Fat Restricted Nutrition Therapy, Tips for Following Low Fat Diet, Pancreatitis Nutrition Therapy, and Low-Fat diet room service menu  Medical food supplement therapy: see above    Goals  Patient to consume % of nutritionally adequate meal trays TID, or the equivalent with supplements/snacks.     Monitoring/Evaluation  Progress toward goals will be monitored and evaluated per protocol.     Debbie Mosqueda RD, LD  Pager: [phone]

## 2018-04-04 VITALS
OXYGEN SATURATION: 93 % | WEIGHT: 203.6 LBS | HEART RATE: 90 BPM | BODY MASS INDEX: 29.15 KG/M2 | SYSTOLIC BLOOD PRESSURE: 150 MMHG | DIASTOLIC BLOOD PRESSURE: 82 MMHG | TEMPERATURE: 98 F | HEIGHT: 70 IN | RESPIRATION RATE: 18 BRPM

## 2018-04-04 LAB
ALBUMIN SERPL-MCNC: 2.3 G/DL (ref 3.4–5)
ALP SERPL-CCNC: 86 U/L (ref 40–150)
ALT SERPL W P-5'-P-CCNC: 15 U/L (ref 0–70)
ANION GAP SERPL CALCULATED.3IONS-SCNC: 9 MMOL/L (ref 3–14)
AST SERPL W P-5'-P-CCNC: 13 U/L (ref 0–45)
BILIRUB SERPL-MCNC: 1.2 MG/DL (ref 0.2–1.3)
BUN SERPL-MCNC: 15 MG/DL (ref 7–30)
CA-I BLD-MCNC: 4.5 MG/DL (ref 4.4–5.2)
CALCIUM SERPL-MCNC: 8.8 MG/DL (ref 8.5–10.1)
CHLORIDE SERPL-SCNC: 104 MMOL/L (ref 94–109)
CO2 SERPL-SCNC: 23 MMOL/L (ref 20–32)
CREAT SERPL-MCNC: 0.76 MG/DL (ref 0.66–1.25)
ERYTHROCYTE [DISTWIDTH] IN BLOOD BY AUTOMATED COUNT: 15.2 % (ref 10–15)
GFR SERPL CREATININE-BSD FRML MDRD: >90 ML/MIN/1.7M2
GLUCOSE BLDC GLUCOMTR-MCNC: 202 MG/DL (ref 70–99)
GLUCOSE BLDC GLUCOMTR-MCNC: 225 MG/DL (ref 70–99)
GLUCOSE BLDC GLUCOMTR-MCNC: 232 MG/DL (ref 70–99)
GLUCOSE SERPL-MCNC: 199 MG/DL (ref 70–99)
HCT VFR BLD AUTO: 41.2 % (ref 40–53)
HGB BLD-MCNC: 13.6 G/DL (ref 13.3–17.7)
INR PPP: 1.33 (ref 0.86–1.14)
MAGNESIUM SERPL-MCNC: 2.1 MG/DL (ref 1.6–2.3)
MCH RBC QN AUTO: 32.3 PG (ref 26.5–33)
MCHC RBC AUTO-ENTMCNC: 33 G/DL (ref 31.5–36.5)
MCV RBC AUTO: 98 FL (ref 78–100)
PHOSPHATE SERPL-MCNC: 2.1 MG/DL (ref 2.5–4.5)
PLATELET # BLD AUTO: 298 10E9/L (ref 150–450)
POTASSIUM SERPL-SCNC: 4.1 MMOL/L (ref 3.4–5.3)
PROT SERPL-MCNC: 7.2 G/DL (ref 6.8–8.8)
RBC # BLD AUTO: 4.21 10E12/L (ref 4.4–5.9)
SODIUM SERPL-SCNC: 136 MMOL/L (ref 133–144)
WBC # BLD AUTO: 19.7 10E9/L (ref 4–11)

## 2018-04-04 PROCEDURE — 25000132 ZZH RX MED GY IP 250 OP 250 PS 637: Performed by: STUDENT IN AN ORGANIZED HEALTH CARE EDUCATION/TRAINING PROGRAM

## 2018-04-04 PROCEDURE — 99238 HOSP IP/OBS DSCHRG MGMT 30/<: CPT | Mod: GC | Performed by: INTERNAL MEDICINE

## 2018-04-04 PROCEDURE — 83735 ASSAY OF MAGNESIUM: CPT | Performed by: INTERNAL MEDICINE

## 2018-04-04 PROCEDURE — 85027 COMPLETE CBC AUTOMATED: CPT | Performed by: INTERNAL MEDICINE

## 2018-04-04 PROCEDURE — 00000146 ZZHCL STATISTIC GLUCOSE BY METER IP

## 2018-04-04 PROCEDURE — 36415 COLL VENOUS BLD VENIPUNCTURE: CPT | Performed by: INTERNAL MEDICINE

## 2018-04-04 PROCEDURE — 84100 ASSAY OF PHOSPHORUS: CPT | Performed by: INTERNAL MEDICINE

## 2018-04-04 PROCEDURE — 82330 ASSAY OF CALCIUM: CPT | Performed by: INTERNAL MEDICINE

## 2018-04-04 PROCEDURE — 80053 COMPREHEN METABOLIC PANEL: CPT | Performed by: INTERNAL MEDICINE

## 2018-04-04 PROCEDURE — 85610 PROTHROMBIN TIME: CPT | Performed by: INTERNAL MEDICINE

## 2018-04-04 RX ORDER — WARFARIN SODIUM 2.5 MG/1
2.5 TABLET ORAL
Status: DISCONTINUED | OUTPATIENT
Start: 2018-04-04 | End: 2018-04-04 | Stop reason: HOSPADM

## 2018-04-04 RX ADMIN — TAMSULOSIN HYDROCHLORIDE 0.4 MG: 0.4 CAPSULE ORAL at 08:08

## 2018-04-04 RX ADMIN — POTASSIUM PHOSPHATE, MONOBASIC 500 MG: 500 TABLET, SOLUBLE ORAL at 11:57

## 2018-04-04 RX ADMIN — METOPROLOL TARTRATE 50 MG: 50 TABLET ORAL at 08:08

## 2018-04-04 RX ADMIN — LISINOPRIL 10 MG: 5 TABLET ORAL at 08:08

## 2018-04-04 RX ADMIN — POTASSIUM PHOSPHATE, MONOBASIC 500 MG: 500 TABLET, SOLUBLE ORAL at 08:08

## 2018-04-04 NOTE — DISCHARGE SUMMARY
[facility], [place]    Internal Medicine Discharge Summary- [facility]    Date of Admission:  3/30/2018  Date of Discharge:  4/4/2018  1:47 PM  Discharging Attending Provider: Nadeen Herr  Discharge Team: [facility] 1    Discharge Diagnoses   Pancreatitis  Lactic Acidosis, Resolved  Atrial Fibrillation with RVR  HTN  DMII  Neuropathy  BPH    Follow-ups Needed After Discharge   PCP within one week  GI within a few months    Hospital Course   Mr Russo is a 71 yo M with h/o recurrent pancreatitis and IPMN (s/p distal pancreatectomy c/b pancreatic leak in 2016), s/p ERCP with sphincterotomy and stent placement (common bile duct and pancreatic duct to decompress leak) on 3/29 who presents to the ED two days after ERCP with severe abdominal pain that began the day of admission. He had been doing well after his procedure until eating a large breakfast of mini-tacos. Pt was found to have pancreatitis and lactic acidosis on admission. He was treated in the ICU for his elevated lactate and c/f sepsis.     # Pancreatitis s/p ERCP  # Lactic acidosis, resolved  # Abd pain, nausea  Pt has a h/o recurrent pancreatitis and IPMN (s/p distal pancreatectomy c/b pancreatic leak in 2016), s/p ERCP with sphincterotomy and stent placement (common bile duct and pancreatic duct to decompress leak) on 3/29 who presented to the ED with severe abdominal pain that began two days after his ERCP. He was admitted to the ICU for lactate of 14.7 and c/f sepsis. Blood cultures were obtained that remained negative. A urine culture grew 10-50 K colonies of E coli. He was made NPO and treated with IVF resuscitation. He met severe sepsis criteria, and due to recent GI instrumentation two days prior, was empirically treated with zosyn. His lactate trended down from 14.7 with IVF. Antibiotics were discontinued 72 hours after and we slowly advanced his diet. At discharge, he was able to tolerate a regular diet without  abdominal pain, nausea, or vomiting. We provided him with dilaudid for pain. At discharge he was not requiring further pain meds. His blood cultures have NGTD.     # Atrial Fibrillation with RVR  # Volume Overload  Pt was continued on PTA metoprolol succinate 50 mg BID. He was then transitioned to metoprolol tartrate 50 mg BID. This did not control his Atrial fibrillation well, and thus on discharge, we continued his home metoprolol succinate 50 mg BID. His HR improved with diuresis with lasix (volume overload likely caused atrial stretch contributing to atrial fibrillation).     # c/f MARGARET- 2/2 inflammation- Resolved  Cr was 0.7-8, up to 1.29 on admission 3/30, but back down to 1 the next day, and near normal 4/1.    # T2DM w/ neuropathy  Last HgbA1c 7.7% on 3/16. During this admission, we held his PTA Glipizide and metformin and treated him with sliding scale insulin. We resumed his PTA diabetes meds on discharge (Gabapentin 600mg QHS, PTA Glipizide and metformin).    #HTN   Continue PTA Lisinopril 10mg     #BPH  Continue PTA tamsulosin 0.4mg     # Discharge Pain Plan:   - Patient currently has NO PAIN and is not being prescribed pain medications on discharge.    Consultations This Hospital Stay   PHYSICAL THERAPY ADULT IP CONSULT  GI PANCREATICOBILIARY ADULT IP CONSULT  VASCULAR ACCESS CARE ADULT IP CONSULT  PHARMACY TO DOSE VANCO  VASCULAR ACCESS CARE ADULT IP CONSULT  PHARMACY TO DOSE WARFARIN  NUTRITION SERVICES ADULT IP CONSULT     Code Status   Full Code       The patient was discussed with Dr. Nadeen Herr.     Ivy Quintero  [facility]  Pager: [phone]  ______________________________________________________________________    Physical Exam   Vital Signs: Temp: 98  F (36.7  C) Temp src: Oral BP: 150/82 Pulse: 90 Heart Rate: 111 Resp: 18 SpO2: 93 % O2 Device: None (Room air)    Weight: 203 lbs 9.6 oz    Gen: In no acute distress. Patient sitting up in bed.  HEENT: No scleral icterus,  Moist mucous membranes. No nasal discharge.  CV: JVP above the clavicle at 45 degrees. RRR. S1/S2 normal.  No murmurs, rubs or gallops   Resp: Mild crackles in bilateral bases- significantly improved from previous exam.  Abdomen: Soft, mildly tender abdomen worst in epigastrum, normal active bowel sounds,   Extremities: No peripheral edema, warm, capillary refill < 2 seconds  Skin: without rash or trauma  Neuro: Mentation grossly intact, answers questions appropriately, CN II-XII grossly in tact.    Significant Results and Procedures   Most Recent 3 CBC's:  Recent Labs   Lab Test  04/04/18   0756  04/03/18   0838  04/02/18   0707   WBC  19.7*  24.0*  29.9*   HGB  13.6  12.9*  12.0*   MCV  98  100  99   PLT  298  234  193     Most Recent 3 BMP's:  Recent Labs   Lab Test  04/04/18   0756  04/03/18   1843  04/03/18   0838   04/02/18   0707   NA  136   --   140   --   139   POTASSIUM  4.1  4.0  3.9   < >  4.3   CHLORIDE  104   --   105   --   104   CO2  23   --   23   --   22   BUN  15   --   15   --   13   CR  0.76   --   0.84   --   0.89   ANIONGAP  9   --   11   --   12   NILSON  8.8   --   9.0   --   8.2*   GLC  199*   --   181*   --   175*    < > = values in this interval not displayed.     Most Recent 2 LFT's:  Recent Labs   Lab Test  04/04/18   0756  04/03/18   0838   AST  13  19   ALT  15  12   ALKPHOS  86  58   BILITOTAL  1.2  1.7*     Most Recent 3 INR's:  Recent Labs   Lab Test  04/04/18   0756  04/03/18   0838  03/31/18   0416   INR  1.33*  1.23*  1.14       Pending Results   These results will be followed up by PCP  Unresulted Labs Ordered in the Past 30 Days of this Admission     Date and Time Order Name Status Description    3/31/2018 0447 Blood culture Preliminary     3/31/2018 0447 Blood culture Preliminary     3/31/2018 0003 Blood culture Preliminary     3/30/2018 2323 Blood culture Preliminary              Primary Care Physician   Katie Gross    Discharge Disposition   Discharged to home  Condition  at discharge: Stable    Discharge Orders     Medication Therapy Management Referral     GASTROENTEROLOGY ADULT REF CONSULT ONLY     INR Clinic Referral     Reason for your hospital stay   You were admitted for pancreatitis. You were very sick. To prevent another attack we recommend a lot-fat diet. If your symptoms return, please come back to the hospital.     Activity   Your activity upon discharge: activity as tolerated     Adult [facility] Follow-up and recommended labs and tests   Follow up with primary care provider, Katie Gross, within 7 days to evaluate treatment change.  We recommend INR, CBC, and BMP, Magnesium, and phosphorus.      Follow up with GI within a few months.     Appointments on [place] and/or [facility] (with [facility] or [facility] provider or service). Call [phone] if you haven't heard regarding these appointments within 7 days of discharge.     Full Code     Update Provider     Diet   Follow this diet upon discharge: Orders Placed This Encounter     Calorie Counts     Snacks/Supplements Adult: Between Meals     Combination Diet Regular Diet Adult; Low Fat Diet       Discharge Medications   Discharge Medication List as of 4/4/2018  1:07 PM      START taking these medications    Details   potassium phosphate, monobasic, (K-PHOS) 500 MG tablet Take 1 tablet (500 mg) by mouth 4 times daily (with meals and nightly) for 4 days, Disp-16 tablet, R-0, E-Prescribe         CONTINUE these medications which have NOT CHANGED    Details   warfarin (COUMADIN) 2.5 MG tablet 1.25 mg Tues/Sat and 2.5 mg all other days or As directed by Anticoagulation Clinic, Disp-90 tablet, R-0, Historical      metoprolol succinate (TOPROL-XL) 50 MG 24 hr tablet Take 1 tablet (50 mg) by mouth 2 times daily, Disp-180 tablet, R-3, E-Prescribe      gabapentin (NEURONTIN) 300 MG capsule Take 2 tablest (600 mg) every night, Disp-180 capsule, R-1, E-Prescribe      metFORMIN (GLUCOPHAGE-XR) 500 MG 24 hr tablet Take 2 tablets  (1,000 mg) by mouth 2 times daily (with meals), Disp-360 tablet, R-1, E-Prescribe      tamsulosin (FLOMAX) 0.4 MG capsule Take 1 capsule (0.4 mg) by mouth daily, Disp-90 capsule, R-3, E-Prescribe      lisinopril (PRINIVIL/ZESTRIL) 10 MG tablet Take 1 tablet (10 mg) by mouth daily, Disp-90 tablet, R-1, E-Prescribe      glipiZIDE (GLIPIZIDE XL) 2.5 MG 24 hr tablet Take 1 tablet (2.5 mg) by mouth every morning, Disp-90 tablet, R-3, E-Prescribe      multivitamin, therapeutic with minerals (THERA-VIT-M) TABS Take 1 tablet by mouth daily., Disp-30 each, R-1, E-Prescribe           Allergies   Allergies   Allergen Reactions     Nkda [No Known Drug Allergies]

## 2018-04-04 NOTE — PLAN OF CARE
Problem: Patient Care Overview  Goal: Plan of Care/Patient Progress Review  Outcome: Adequate for Discharge Date Met: 04/04/18  Pt is A&O x 4, tachycardic and on room air. Reviewed d/c instructions w/ pt and wife and answered all questions. Pt gave verbal understanding of instructions. PIV and tele box removed. All belongings gathered per pt and brought home. Pt is adequate for d/c.

## 2018-04-04 NOTE — PROGRESS NOTES
Calorie Count  Intake recorded for: 4/3 Kcals: 801  Protein: 36g   # Meals Recorded: 100% 2 ice creams, coffee, whole milk, 2 1% milks, mushroom soup, 50% fruit cup, 25% penne w/ chicken & marinara   # Supplements Recorded: 0

## 2018-04-04 NOTE — PLAN OF CARE
Problem: Patient Care Overview  Goal: Plan of Care/Patient Progress Review  Outcome: No Change  1900-0730: Pt admitted for pancreatitis. A&Ox4. VSS on RA ex tachycardic with activity (110-120s). Tele reads a fib. Up independently. Calling appropriately. Low fat diet, calorie counts. BGs in 200s overnight. Poor appetite. Denies pain, N/V. R PIV SL, patent. 1 PIV removed, pt c/o pain at site. No sign of infiltration.  K 4.0, replaced. Mag 1.8, replaced. Phos 2.0, replaced. Rechecks this am. Voiding spontaneously, tyrone urine. Continues to have loose BMs. Plan to discharge home today. Will continue to monitor and follow POC.

## 2018-04-05 ENCOUNTER — PATIENT OUTREACH (OUTPATIENT)
Dept: CARE COORDINATION | Facility: CLINIC | Age: 73
End: 2018-04-05

## 2018-04-05 ENCOUNTER — CARE COORDINATION (OUTPATIENT)
Dept: CARE COORDINATION | Facility: CLINIC | Age: 73
End: 2018-04-05

## 2018-04-05 ENCOUNTER — ANTICOAGULATION THERAPY VISIT (OUTPATIENT)
Dept: ANTICOAGULATION | Facility: CLINIC | Age: 73
End: 2018-04-05
Payer: COMMERCIAL

## 2018-04-05 ENCOUNTER — TELEPHONE (OUTPATIENT)
Dept: FAMILY MEDICINE | Facility: CLINIC | Age: 73
End: 2018-04-05

## 2018-04-05 DIAGNOSIS — Z79.01 LONG-TERM (CURRENT) USE OF ANTICOAGULANTS: ICD-10-CM

## 2018-04-05 DIAGNOSIS — I48.20 CHRONIC ATRIAL FIBRILLATION (H): ICD-10-CM

## 2018-04-05 LAB — INR POINT OF CARE: 1.6 (ref 0.86–1.14)

## 2018-04-05 PROCEDURE — 85610 PROTHROMBIN TIME: CPT | Mod: QW

## 2018-04-05 PROCEDURE — 36416 COLLJ CAPILLARY BLOOD SPEC: CPT

## 2018-04-05 PROCEDURE — 99207 ZZC NO CHARGE NURSE ONLY: CPT

## 2018-04-05 NOTE — MR AVS SNAPSHOT
Antoine Russo   4/5/2018 10:30 AM   Anticoagulation Therapy Visit    Description:  72 year old male   Provider:  [facility] ANTI BROCK   Department:  [facility] Antico[facility]           INR as of 4/5/2018     Today's INR 1.6!      Anticoagulation Summary as of 4/5/2018     INR goal 2.0-3.0   Today's INR 1.6!   Full instructions 4/7: 2.5 mg; Otherwise 1.25 mg on Tue, Sat; 2.5 mg all other days   Next INR check 4/16/2018    Indications   Chronic atrial fibrillation (H) [I48.2]         Description     Take 2.5 mg daily through 4-10 and then take 1.25 mg on 4-10 and again 4-14. Otherwise, take 2.5 mg.      Your next Anticoagulation Clinic appointment(s)     Apr 17, 2018  9:45 AM CDT   Anticoagulation Visit with [place] [facility]   [facility]    [address] [facility] [id number] [phone]            Apr 26, 2018  9:45 AM CDT   [facility]    [address] [place] [address] [phone]              Contact Numbers     Please call [phone] with any problems or questions regarding your therapy.    If you need to cancel and/or reschedule your appointment please call one of the following numbers:  [facility] - [phone]  [place] - [phone]  [place] - [phone]  [facility] [phone]  [place] - [phone]            April 2018 Details    Sun Mon Tue Wed Thu Fri Sat     1               2               3               4               5      2.5 mg   See details      6      2.5 mg         7      2.5 mg           8      2.5 mg         9      2.5 mg         10      1.25 mg         11      2.5 mg         12      2.5 mg         13      2.5 mg         14      1.25 mg           15      2.5 mg         16            17               18               19               20               21                 22               23               24               25                26               27               28                 29               30                     Date Details   04/05 This INR check       Date of next INR:  4/16/2018         How to take your warfarin dose     To take:  1.25 mg Take 0.5 of a 2.5 mg tablet.    To take:  2.5 mg Take 1 of the 2.5 mg tablets.

## 2018-04-05 NOTE — PROGRESS NOTES
ANTICOAGULATION FOLLOW-UP CLINIC VISIT    Patient Name:  Antoine Russo  Date:  4/5/2018  Contact Type:  Face to Face    SUBJECTIVE:     Patient Findings     Positives Change in medications (started KPHOS after recent hospitalization), Hospital admission (3-30 to 4-4 for pancreatitis following ERCP with sphincterotomy and stent placement (common bile duct and pancreatic duct to decompress leak) on 3/29), Intentional hold of therapy (3-24 to 4-1)    Comments Hospitalized 3-30 to 4-4 for pancreatitis following ERCP with sphincterotomy and stent placement (common bile duct and pancreatic duct to decompress leak) on 3/29.    Patient's INR is coming back up after a lengthy hold. He is leaving 4-9 to 4-13 for [place] and will be flying. Will check at [facility] on 4-17 because he needs a morning appt and [facility] full on 4-16 in AM. Writer will increase dose on 4-7 to help ensure INR is back in range before flight. He states he is feeling better now that he is home.           OBJECTIVE    INR Protime   Date Value Ref Range Status   04/05/2018 1.6 (A) 0.86 - 1.14 Final       ASSESSMENT / PLAN  INR assessment SUB RECENT hold   Recheck INR In: 10 DAYS    INR Location Clinic      Anticoagulation Summary as of 4/5/2018     INR goal 2.0-3.0   Today's INR 1.6!   Maintenance plan 1.25 mg (2.5 mg x 0.5) on Tue, Sat; 2.5 mg (2.5 mg x 1) all other days   Full instructions 4/7: 2.5 mg; Otherwise 1.25 mg on Tue, Sat; 2.5 mg all other days   Weekly total 15 mg   Plan last modified Aparna Kunz, RN (3/8/2018)   Next INR check 4/17/2018   Priority INR   Target end date Indefinite    Indications   Chronic atrial fibrillation (H) [I48.2]  Long-term (current) use of anticoagulants [Z79.01] [Z79.01]         Anticoagulation Episode Summary     INR check location     Preferred lab     Send INR reminders to [facility] CLINIC POOL    Comments *       Anticoagulation Care Providers     Provider Role Specialty Phone number    Katie Gross,  MD [facility] [phone]            See the Encounter Report to view Anticoagulation Flowsheet and Dosing Calendar (Go to Encounters tab in chart review, and find the Anticoagulation Therapy Visit)        Aparna Kunz RN

## 2018-04-05 NOTE — LETTER
Health Care Home - Access Care Plan    About Me  Patient Name:  Antoine Christian    YOB: 1945  Age:                             72 year old   Izzy MRN:            [id number] Telephone Information:     Home Phone [phone]   Mobile [phone]       Address:    [address] [address] Email address:  [email or web address])      Emergency Contact(s)  Name Relationship Lgl Grd Work Phone Home Phone Mobile Phone   1. MORE CHRISTIAN Spouse  none [phone]   2. NO SECONDARY C* Other   None              Health Maintenance: Routine Health maintenance Reviewed: Due/Overdue   Health Maintenance Due   Topic Date Due     EYE EXAM Q1 YEAR  05/15/2015     TETANUS IMMUNIZATION (SYSTEM ASSIGNED)  11/03/2016     COLONOSCOPY Q5 YR  12/18/2017         My Access Plan  Medical Emergency 911   Questions or concerns during clinic hours Primary Clinic Line, I will call the clinic directly: [facility] - [phone]   24 Hour Appointment Line [phone] or  [phone]) (toll free)   24 Hour Nurse Line [phone] (toll free)   Questions or concerns outside clinic hours 24 Hour Appointment Line, I will call the after-hours on-call line:   [facility] [phone] or [phone]) (toll-free)   Preferred Urgent Care [facility] [phone]   Preferred Hospital [place], [place]  [phone]   Preferred Pharmacy [facility] [address] [place] [address]     Behavioral Health Crisis Line The National Suicide Prevention Lifeline at 1-756.385.2894 or 911     My Care Team Members  Patient Care Team       Relationship Specialty Notifications Start End    Katie Gross MD PCP - General Family Practice  8/10/15     Phone: [phone] Fax: [phone]         [address] [place] [address]    Andrea García MD MD ENT  2/17/16     Phone: [phone] Pager:  [phone] Fax: [phone]        [facility] [address] [facility] [address]    Grabiel Plata MD MD Gastroenterology  2/17/16     Phone: [phone] Fax: [phone]         [address] [facility] [address]    Marcio Aggarwal MD MD Urology  3/2/17     Phone: [phone] Fax: [phone]         [address] [facility]  [facility] [address]    TEJINDER Morales MD MD Urology  3/3/17     Phone: [phone] Pager: [phone] Fax: [phone] [address] [facility] [address]    Lilian Churchill, RN Registered Nurse Urology  3/3/17     Phone: [phone] Pager: [phone]        Katie Gross MD Referring Physician Family Practice  3/10/17     Phone: [phone] Fax: [phone]         [address] [facility] [address]    Coreen Craig, RN Clinic Care Coordinator Primary Care - CC Admissions 4/5/18     Phone: [phone] Fax: [phone]            My Medical and Care Information  Problem List   Patient Active Problem List   Diagnosis     Hypertension, benign essential, goal below 140/90     Pain in joint, shoulder region     Hypertrophy of prostate without urinary obstruction     Impotence of organic origin     PERS HX TOBACCO USE - quit in 11/06 with chantix     Diverticulitis of colon     Hypertrophy of breast     CAD (coronary artery disease)     GERD (gastroesophageal reflux disease)     Hyperlipidemia LDL goal <100     Advanced directives, counseling/discussion     Colon polyp     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)     Peripheral vascular disease (H)     Colitis     Groin fluid collection     Clostridium difficile enterocolitis     Health Care Home     H/O colectomy     IPMN (intraductal papillary mucinous neoplasm)     Pancreatic duct leak     Type 2 diabetes mellitus with diabetic polyneuropathy, without long-term current use of insulin (H)     Left varicocele     Anticipated difficulty with  intubation     Spleen absent     Alcohol-induced acute pancreatitis without infection or necrosis     Pancreatitis     Chronic atrial fibrillation (H)     Pancreatitis, recurrent (H)     Recurrent pancreatitis (H)      Current Medications and Allergies:  See printed Medication Report

## 2018-04-05 NOTE — TELEPHONE ENCOUNTER
ED/UC/IP follow up phone call:    RN please call to follow up.    Number of ED visits in past 12 months = 2  Janie Orn Station Sec

## 2018-04-05 NOTE — LETTER
[place] CARE COORDINATION  [facility]  [address] [place] [address], [place]  [address]    April 5, 2018    Antoine Russo  [address] [address]      Dear Antoine,    I am a clinic care coordinator who works with Katie Gross MD at [facility]. I wanted to thank you for spending the time to talk with me.  I wanted to introduce myself and provide you with my contact information so that you can call me with questions or concerns about your health care. Below is a description of clinic care coordination and how I can further assist you.     The clinic care coordinator is a registered nurse and/or  who understand the health care system. The goal of clinic care coordination is to help you manage your health and improve access to the [facility] system in the most efficient manner. The registered nurse can assist you in meeting your health care goals by providing education, coordinating services, and strengthening the communication among your providers. The  can assist you with financial, behavioral, psychosocial, chemical dependency, counseling, and/or psychiatric resources.    Please feel free to contact me at [phone], with any questions or concerns. We at [facility] are focused on providing you with the highest-quality healthcare experience possible and that all starts with you.     Sincerely,     Coreen Craig    Enclosed: I have enclosed a copy of a 24 Hour Access Plan. This has helpful phone numbers for you to call when needed. Please keep this in an easy to access place to use as needed. I have enclosed an Authorization to Discuss Protected Health Information form. Please review, fill out, sign and send back to me so I can make sure we have this on file to be able to talk to family/friends if you would like us to be able to. I am also adding information about free classes that [facility] does to help you fill out a health care directive, since  I see you do not have on in your chart.

## 2018-04-05 NOTE — PROGRESS NOTES
Clinic Care Coordination Contact    OUTREACH    Referral Information:  Referral Source: IP Handoff    Primary Diagnosis: GI Disorders    Chief Complaint   Patient presents with     Clinic Care Coordination - Post Hospital     Nurse: d/c [facility] 4/4/18        [place] Utilization:   Utilization    Last refreshed: 4/5/2018 10:55 AM:  No Show Count (past year) 0       Last refreshed: 4/5/2018 10:55 AM:  ED visits 1       Last refreshed: 4/5/2018 10:55 AM:  Hospital admissions 3          Current as of: 4/5/2018 10:55 AM             Clinical Concerns:  Current Medical Concerns:  RN SWEETIE spoke with patient, he states he is doing better now that he is home. He states he is tired but better. RN CC assisted him in making an appointment to see PCP for Monday 4/16/18 @ 8am for hospital f/u. Patient is going out of town on Monday morning thru Friday 4/13/18. He denies having any questions or concerns regarding his discharge instructions.     Current Behavioral Concerns: no concerns at this time.      Education Provided to patient: RN CC educated about Care Coordination Services, discharge instructions, medications reviewed and follow up.         Health Maintenance Reviewed: Due/Overdue   Health Maintenance Due   Topic Date Due     EYE EXAM Q1 YEAR  05/15/2015     TETANUS IMMUNIZATION (SYSTEM ASSIGNED)  11/03/2016     COLONOSCOPY Q5 YR  12/18/2017         Medication Management:  Patient is independent in medication management. Pt. verbalized adherence and understanding of medication regimen, side effects, purposes.      Functional Status:  Mobility Status: Independent  Equipment Currently Used at Home:  (own SEC and FWW)  Transportation:  Transportation means:: Regular car     Psychosocial:  Current living arrangement:: I live in a private home with spouse  Type of residence:: Private home - stairs  Financial/Insurance: No concerns at this time.       Resources and Interventions:  Current Resources:  RN will mail out a letter to  pt's home with RN CC contact information, explanation of CC services and patient care plan.     Advanced Care Plans/Directives on file:: No        Goals:   n/a      Patient/Caregiver understanding: Patient verbalized understanding of his discharge instructions.     Future Appointments              In 1 week Katie Gross MD [facility]    In 1 week [place] [facility]    In 3 weeks [place] ANTI CO[facility]           Plan:   Patient will continue to follow treatment plan as directed and follow up with PCP with concerns ongoing.   RN CC to f/u after OV with PCP to see how he is doing.    Coreen Craig RN, [facility]  RN Care Coordinator  [facility]  Phone # [phone]  4/5/2018 11:18 AM

## 2018-04-06 ENCOUNTER — ALLIED HEALTH/NURSE VISIT (OUTPATIENT)
Dept: PHARMACY | Facility: CLINIC | Age: 73
End: 2018-04-06
Attending: INTERNAL MEDICINE
Payer: COMMERCIAL

## 2018-04-06 DIAGNOSIS — I48.20 CHRONIC ATRIAL FIBRILLATION (H): ICD-10-CM

## 2018-04-06 DIAGNOSIS — I10 HYPERTENSION, BENIGN ESSENTIAL, GOAL BELOW 140/90: ICD-10-CM

## 2018-04-06 DIAGNOSIS — N40.0 HYPERTROPHY OF PROSTATE WITHOUT URINARY OBSTRUCTION: ICD-10-CM

## 2018-04-06 DIAGNOSIS — E63.9 NUTRITIONAL DEFICIENCY: ICD-10-CM

## 2018-04-06 DIAGNOSIS — I25.10 CORONARY ARTERY DISEASE INVOLVING NATIVE HEART, ANGINA PRESENCE UNSPECIFIED, UNSPECIFIED VESSEL OR LESION TYPE: ICD-10-CM

## 2018-04-06 DIAGNOSIS — E11.42 TYPE 2 DIABETES MELLITUS WITH DIABETIC POLYNEUROPATHY, WITHOUT LONG-TERM CURRENT USE OF INSULIN (H): ICD-10-CM

## 2018-04-06 DIAGNOSIS — E83.39 HYPOPHOSPHATEMIA: ICD-10-CM

## 2018-04-06 LAB
BACTERIA SPEC CULT: NO GROWTH
SPECIMEN SOURCE: NORMAL

## 2018-04-06 PROCEDURE — 99605 MTMS BY PHARM NP 15 MIN: CPT | Performed by: PHARMACIST

## 2018-04-06 PROCEDURE — 99607 MTMS BY PHARM ADDL 15 MIN: CPT | Performed by: PHARMACIST

## 2018-04-06 NOTE — PROGRESS NOTES
SUBJECTIVE/OBJECTIVE:                Antoine Russo is a 72 year old male called for a transitions of care visit.  He was discharged from [facility] on 4/4/18 for pancreatitis.     Chief Complaint: Hospital Follow-Up.  Personal Healthcare Goals: concerned about what he can and cannot eat    Allergies/ADRs: Reviewed in Epic  Tobacco: History of tobacco dependence - quit 2006   Alcohol: not since January 22nd, 2018  Caffeine: 1-2 cups/day of coffee, 1-2 cups/day of tea, 0-1 sodas/day  Activity: Limited since home - feels somewhat deconditioned  PMH: Reviewed in Epic    Medication Adherence/Access:  Patient takes medications directly from bottles.  Patient takes medications 2 time(s) per day.  Per patient, misses medication 0 times per week.   Medication barriers: none.   The patient fills medications at [place]: NO, fills medications at [facility] in [place].    Pancreatitis/Hypophosphatemia: Pt was hospitalized for his fourth episode of pancreatitis.  He had issues following eating several mini-tacos and shortly after ERCP.  He is concerned about what he should be able to eat.  Previous issues have been related to food, alcohol, and complicated pancreatitic history.  Is finishing up four day course of potassium phosphate.  Planning on going to [place] for one week prior to returning for follow-up appointment with PCP.     Diabetes:  Pt currently taking glipizide XL 2.5 mg daily and metformin  mg - two tablets twice daily. On gabapentin in the evening and finds helpful. Pt is not experiencing side effects.  SMBG: rarely.    Patient is not experiencing hypoglycemia  Recent symptoms of high blood sugar? none  Eye exam: due  Foot exam: up to date  Microalbumin is not < 30 mg/g. Pt is taking an ACEi/ARB.  Aspirin: Not taking due to warfarin  Diet/Exercise: Confused about diet now with the diabetes and pancreatitis issues.     Hypertension/AFib/CAD: Current medications include lisinopril 10 mg daily,metoprolol XL 50 mg  twice daily, and warfarmin managed by the  anticoagulation service. Last INR was 1.6 (had intentional holds).  Patient does not self-monitor BP.  Patient reports no current medication side effects.    BPH: Currently taking tamsulosin 0.4 mg daily. Pt finds this to be effective - used to have to go multiple times per hours and now has extended it a few hours. Pt reports no known side effects.     Supplements: Pt is taking a daily multivitamin. Denies any issues.    Current labs include:  BP Readings from Last 3 Encounters:   04/04/18 150/82   03/30/18 139/79   03/29/18 134/81     Today's Vitals: There were no vitals taken for this visit. - telephone encounter, no vitals  Lab Results   Component Value Date    A1C 7.7 03/16/2018   .  Lab Results   Component Value Date    CHOL 143 03/16/2018     Lab Results   Component Value Date    TRIG 221 03/16/2018     Lab Results   Component Value Date    HDL 30 03/16/2018     Lab Results   Component Value Date    LDL 69 03/16/2018       Liver Function Studies -   Recent Labs   Lab Test  04/04/18   0756   PROTTOTAL  7.2   ALBUMIN  2.3*   BILITOTAL  1.2   ALKPHOS  86   AST  13   ALT  15       Lab Results   Component Value Date    UCRR 149 10/10/2017    MICROL 187 10/10/2017    UMALCR 125.50 (H) 10/10/2017       Last Basic Metabolic Panel:  Lab Results   Component Value Date     04/04/2018      Lab Results   Component Value Date    POTASSIUM 4.1 04/04/2018     Lab Results   Component Value Date    CHLORIDE 104 04/04/2018     Lab Results   Component Value Date    BUN 15 04/04/2018     Lab Results   Component Value Date    CR 0.76 04/04/2018     GFR Estimate   Date Value Ref Range Status   04/04/2018 >90 >60 mL/min/1.7m2 Final     Comment:     Non  GFR Calc   04/03/2018 90 >60 mL/min/1.7m2 Final     Comment:     Non  GFR Calc   04/02/2018 85 >60 mL/min/1.7m2 Final     Comment:     Non  GFR Calc     TSH   Date Value Ref Range  Status   2017 1.37 0.40 - 4.00 mU/L Final     Most Recent Immunizations   Administered Date(s) Administered     Hib (PRP-T) 2016     Meningococcal (Menactra ) 2016     Pneumo Conj 13-V (2010&after) 10/10/2017     Pneumococcal 23 valent 2016     TD (ADULT, 7+) 2006       ASSESSMENT:                 Current medications were reviewed today.      Medication Adherence: good, no issues identified    Pancreatitis/Hypophosphatemia: Needs Improvement. Patient would likely benefit from referral to dietician to discuss diet that would work with both diabetes and recurrent pancreatitis.  There seems to be known connection to diet/alcohol use with previous episodes, but if these events continue without triggers may need to explore rare medication causes including ACE inhibitor and metformin.     Diabetes: Stable. Pt is meeting A1c goal of <8%.    Hypertension/AFib/CAD: Needs Improvement. Last BP is not at goal of <140/90 mmHg, but was during stay for pancreatitis. Would benefit from recheck. Last INR was subtherapeutic, but plan is in place for follow-up.     BPH: Improved. May benefit from improving non-pharmacologic voiding techniques.     Supplements: Stable.     PLAN:                  Will send plan to Dr. Gross for consideration of the followin. Consider referral to dietician regarding diabetes/recurrent pancreatitis (low-fat) diet    I spent 40 minutes with this patient today. I offer these suggestions for consideration by the PCP. A copy of the visit note was provided to the patient's primary care provider.    Will follow up in 2-4 weeks by phone.    The patient was sent via [employer] a summary of these recommendations as an after visit summary.    Gerson Molina, PharmD, BCACP  Medication Therapy Management Pharmacist  Pager: [phone]

## 2018-04-06 NOTE — MR AVS SNAPSHOT
After Visit Summary   4/6/2018    Antoine Russo    MRN: [id number]           Patient Information     Date Of Birth          1945        Visit Information        Provider Department      4/6/2018 3:00 PM Greg Molina [facility]        Today's Diagnoses     Pancreatitis, recurrent (H)    -  1    Hypophosphatemia        Type 2 diabetes mellitus with diabetic polyneuropathy, without long-term current use of insulin (H)        Hypertension, benign essential, goal below 140/90        Coronary artery disease involving native heart, angina presence unspecified, unspecified vessel or lesion type        Chronic atrial fibrillation (H)        Hypertrophy of prostate without urinary obstruction        Nutritional deficiency           Follow-ups after your visit        Your next 10 appointments already scheduled     Apr 16, 2018  8:00 AM CDT   [facility]   [facility]    [address] [place] [id number] [phone]           Bring a current list of meds and any records pertaining to this visit. For Physicals, please bring immunization records and any forms needing to be filled out. Please arrive 10 minutes early to complete paperwork.            Apr 17, 2018  9:45 AM CDT   Anticoagulation Visit with [place] ANTI COAG   [facility]    [address] [facility] [id number] [phone]            Apr 26, 2018  9:45 AM CDT   [facility] with [place] ANTI COAG   [facility]    [address] [place] [id number] [phone]              Who to contact     If you have questions or need follow up information about today's clinic visit or your schedule please contact [facility] directly at [phone].  Normal or non-critical lab and imaging results will be communicated  to you by [employer], letter or phone within 4 business days after the clinic has received the results. If you do not hear from us within 7 days, please contact the clinic through [email or web address] or phone. If you have a critical or abnormal lab result, we will notify you by phone as soon as possible.  Submit refill requests through [email or web address] or call your pharmacy and they will forward the refill request to us. Please allow 3 business days for your refill to be completed.          Additional Information About Your Visit        [email or web address] Information     [email or web address] gives you secure access to your electronic health record. If you see a primary care provider, you can also send messages to your care team and make appointments. If you have questions, please call your primary care clinic.  If you do not have a primary care provider, please call [phone] and they will assist you.        Care EveryWhere ID     This is your Care EveryWhere ID. This could be used by other organizations to access your [facility] medical records  [id number]         Blood Pressure from Last 3 Encounters:   04/04/18 150/82   03/30/18 139/79   03/29/18 134/81    Weight from Last 3 Encounters:   04/02/18 203 lb 9.6 oz (92.4 kg)   03/30/18 200 lb (90.7 kg)   03/29/18 200 lb 13.4 oz (91.1 kg)              Today, you had the following     No orders found for display         Today's Medication Changes          These changes are accurate as of 4/6/18 11:59 PM.  If you have any questions, ask your nurse or doctor.               These medicines have changed or have updated prescriptions.        Dose/Directions    multivitamin, therapeutic with minerals Tabs tablet   This may have changed:  when to take this   Used for:  Surgery aftercare        Dose:  1 tablet   Take 1 tablet by mouth daily.   Quantity:  30 each   Refills:  1                Primary Care Provider Office Phone # Fax #    Katie Gross [phone] [address] [place] [address]  [phone]        Equal Access to Services     [facility] : Hadii aad ku hadestrelladonna Elodiaali, wazoraidada lujean-claudejonahha, qamariselmckenna fajardotobiasdae matthews, koffi fay. So [facility] [phone].    ATENCIÓN: Si habla español, tiene a snyder disposición servicios gratuitos de asistencia lingüística. Llame al [phone].    We comply with applicable federal civil rights laws and [place] laws. We do not discriminate on the basis of race, color, national origin, age, disability, sex, sexual orientation, or gender identity.            Thank you!     Thank you for choosing [facility]  for your care. Our goal is always to provide you with excellent care. Hearing back from our patients is one way we can continue to improve our services. Please take a few minutes to complete the written survey that you may receive in the mail after your visit with us. Thank you!             Your Updated Medication List - Protect others around you: Learn how to safely use, store and throw away your medicines at www.disposemymeds.org.          This list is accurate as of 4/6/18 11:59 PM.  Always use your most recent med list.                   Brand Name Dispense Instructions for use Diagnosis    ASPIRIN NOT PRESCRIBED    INTENTIONAL     Antiplatelet medication not prescribed intentionally due to Current anticoagulant therapy (warfarin/enoxaparin)        gabapentin 300 MG capsule    NEURONTIN    180 capsule    Take 2 tablest (600 mg) every night    Type 2 diabetes mellitus with diabetic polyneuropathy, without long-term current use of insulin (H)       glipiZIDE 2.5 MG 24 hr tablet    glipiZIDE XL    90 tablet    Take 1 tablet (2.5 mg) by mouth every morning    Type 2 diabetes mellitus with diabetic polyneuropathy, without long-term current use of insulin (H)       lisinopril 10 MG tablet    PRINIVIL/ZESTRIL    90 tablet    Take 1 tablet (10 mg) by mouth daily    Persistent proteinuria       metFORMIN 500 MG 24 hr tablet     GLUCOPHAGE-XR    360 tablet    Take 2 tablets (1,000 mg) by mouth 2 times daily (with meals)    Type 2 diabetes mellitus with diabetic polyneuropathy, without long-term current use of insulin (H)       metoprolol succinate 50 MG 24 hr tablet    TOPROL-XL    180 tablet    Take 1 tablet (50 mg) by mouth 2 times daily    Hypertension, benign essential, goal below 140/90       multivitamin, therapeutic with minerals Tabs tablet     30 each    Take 1 tablet by mouth daily.    Surgery aftercare       potassium phosphate (monobasic) 500 MG tablet    K-PHOS    16 tablet    Take 1 tablet (500 mg) by mouth 4 times daily (with meals and nightly) for 4 days    Hypophosphatemia       tamsulosin 0.4 MG capsule    FLOMAX    90 capsule    Take 1 capsule (0.4 mg) by mouth daily    Urgency of urination, Hypertrophy of prostate without urinary obstruction       warfarin 2.5 MG tablet    COUMADIN    90 tablet    1.25 mg Tues/Sat and 2.5 mg all other days or As directed by Anticoagulation Clinic    Chronic atrial fibrillation (H)

## 2018-04-09 ENCOUNTER — CARE COORDINATION (OUTPATIENT)
Dept: GASTROENTEROLOGY | Facility: CLINIC | Age: 73
End: 2018-04-09

## 2018-04-09 LAB — UPPER EUS: NORMAL

## 2018-04-09 NOTE — PROGRESS NOTES
Per Dr. Beasley:   How bout CT scan in a month then clinic fu. The pancreatic stent and biliary stent will likely be gone by then. No extra abd xray needed. Got readmitted next day after ERCP w pretty significant pancreaittis despite very smooth procedure and stents remaining in place.                   Order placed for CT and message sent to referral team to schedule.   - Message left for him to call with any questions/concerns and that scheduling will reach out to him with date and time for follow-up.     Marianne BUSH, RN Coordinator  Dr. Beasley, Dr. Parish & Dr. Rawls   [facility]  [phone]

## 2018-04-16 ENCOUNTER — OFFICE VISIT (OUTPATIENT)
Dept: FAMILY MEDICINE | Facility: CLINIC | Age: 73
End: 2018-04-16
Payer: COMMERCIAL

## 2018-04-16 ENCOUNTER — HOSPITAL ENCOUNTER (INPATIENT)
Facility: CLINIC | Age: 73
LOS: 1 days | Discharge: HOME | End: 2018-04-18
Attending: EMERGENCY MEDICINE | Admitting: PEDIATRICS
Payer: COMMERCIAL

## 2018-04-16 ENCOUNTER — APPOINTMENT (OUTPATIENT)
Dept: CT IMAGING | Facility: CLINIC | Age: 73
End: 2018-04-16
Attending: EMERGENCY MEDICINE
Payer: COMMERCIAL

## 2018-04-16 VITALS
OXYGEN SATURATION: 98 % | TEMPERATURE: 97.4 F | BODY MASS INDEX: 26 KG/M2 | SYSTOLIC BLOOD PRESSURE: 110 MMHG | HEART RATE: 124 BPM | WEIGHT: 181.6 LBS | DIASTOLIC BLOOD PRESSURE: 60 MMHG | HEIGHT: 70 IN

## 2018-04-16 DIAGNOSIS — K59.00 CONSTIPATION, UNSPECIFIED CONSTIPATION TYPE: Primary | ICD-10-CM

## 2018-04-16 DIAGNOSIS — K85.90 ACUTE RECURRENT PANCREATITIS: ICD-10-CM

## 2018-04-16 DIAGNOSIS — K85.10 ACUTE BILIARY PANCREATITIS, UNSPECIFIED COMPLICATION STATUS: Primary | ICD-10-CM

## 2018-04-16 DIAGNOSIS — I48.20 CHRONIC ATRIAL FIBRILLATION (H): Chronic | ICD-10-CM

## 2018-04-16 DIAGNOSIS — I10 HYPERTENSION, BENIGN ESSENTIAL, GOAL BELOW 140/90: Chronic | ICD-10-CM

## 2018-04-16 DIAGNOSIS — E11.42 TYPE 2 DIABETES MELLITUS WITH DIABETIC POLYNEUROPATHY, WITHOUT LONG-TERM CURRENT USE OF INSULIN (H): Chronic | ICD-10-CM

## 2018-04-16 LAB
ALBUMIN SERPL-MCNC: 2.7 G/DL (ref 3.4–5)
ALP SERPL-CCNC: 84 U/L (ref 40–150)
ALT SERPL W P-5'-P-CCNC: 16 U/L (ref 0–70)
ANION GAP SERPL CALCULATED.3IONS-SCNC: 10 MMOL/L (ref 3–14)
APTT PPP: 41 SEC (ref 22–37)
AST SERPL W P-5'-P-CCNC: 17 U/L (ref 0–45)
BASOPHILS # BLD AUTO: 0 10E9/L (ref 0–0.2)
BASOPHILS NFR BLD AUTO: 0.1 %
BILIRUB SERPL-MCNC: 0.5 MG/DL (ref 0.2–1.3)
BUN SERPL-MCNC: 16 MG/DL (ref 7–30)
CALCIUM SERPL-MCNC: 10.1 MG/DL (ref 8.5–10.1)
CHLORIDE SERPL-SCNC: 102 MMOL/L (ref 94–109)
CO2 BLDCOV-SCNC: 26 MMOL/L (ref 21–28)
CO2 SERPL-SCNC: 23 MMOL/L (ref 20–32)
CREAT SERPL-MCNC: 1.08 MG/DL (ref 0.66–1.25)
DIFFERENTIAL METHOD BLD: ABNORMAL
EOSINOPHIL # BLD AUTO: 0.1 10E9/L (ref 0–0.7)
EOSINOPHIL NFR BLD AUTO: 0.4 %
ERYTHROCYTE [DISTWIDTH] IN BLOOD BY AUTOMATED COUNT: 15.2 % (ref 10–15)
GFR SERPL CREATININE-BSD FRML MDRD: 68 ML/MIN/1.7M2
GLUCOSE BLDC GLUCOMTR-MCNC: 115 MG/DL (ref 70–99)
GLUCOSE BLDC GLUCOMTR-MCNC: 122 MG/DL (ref 70–99)
GLUCOSE BLDC GLUCOMTR-MCNC: 142 MG/DL (ref 70–99)
GLUCOSE SERPL-MCNC: 216 MG/DL (ref 70–99)
HCT VFR BLD AUTO: 41.5 % (ref 40–53)
HGB BLD-MCNC: 13.3 G/DL (ref 13.3–17.7)
IMM GRANULOCYTES # BLD: 0.1 10E9/L (ref 0–0.4)
IMM GRANULOCYTES NFR BLD: 0.6 %
INR PPP: 2.59 (ref 0.86–1.14)
INTERPRETATION ECG - MUSE: NORMAL
LACTATE BLD-SCNC: 1.4 MMOL/L (ref 0.7–2.1)
LIPASE SERPL-CCNC: 1375 U/L (ref 73–393)
LYMPHOCYTES # BLD AUTO: 2.4 10E9/L (ref 0.8–5.3)
LYMPHOCYTES NFR BLD AUTO: 13 %
MCH RBC QN AUTO: 31.8 PG (ref 26.5–33)
MCHC RBC AUTO-ENTMCNC: 32 G/DL (ref 31.5–36.5)
MCV RBC AUTO: 99 FL (ref 78–100)
MONOCYTES # BLD AUTO: 1 10E9/L (ref 0–1.3)
MONOCYTES NFR BLD AUTO: 5.4 %
NEUTROPHILS # BLD AUTO: 14.9 10E9/L (ref 1.6–8.3)
NEUTROPHILS NFR BLD AUTO: 80.5 %
NRBC # BLD AUTO: 0 10*3/UL
NRBC BLD AUTO-RTO: 0 /100
PCO2 BLDV: 40 MM HG (ref 40–50)
PH BLDV: 7.42 PH (ref 7.32–7.43)
PLATELET # BLD AUTO: 624 10E9/L (ref 150–450)
PO2 BLDV: 48 MM HG (ref 25–47)
POTASSIUM SERPL-SCNC: 5.2 MMOL/L (ref 3.4–5.3)
PROT SERPL-MCNC: 7.4 G/DL (ref 6.8–8.8)
RBC # BLD AUTO: 4.18 10E12/L (ref 4.4–5.9)
SAO2 % BLDV FROM PO2: 85 %
SODIUM SERPL-SCNC: 135 MMOL/L (ref 133–144)
WBC # BLD AUTO: 18.5 10E9/L (ref 4–11)

## 2018-04-16 PROCEDURE — 83690 ASSAY OF LIPASE: CPT | Performed by: EMERGENCY MEDICINE

## 2018-04-16 PROCEDURE — 99207 ZZC APP CREDIT; MD BILLING SHARED VISIT: CPT | Performed by: PHYSICIAN ASSISTANT

## 2018-04-16 PROCEDURE — 25000132 ZZH RX MED GY IP 250 OP 250 PS 637: Performed by: PHYSICIAN ASSISTANT

## 2018-04-16 PROCEDURE — 25000128 H RX IP 250 OP 636: Performed by: RADIOLOGY

## 2018-04-16 PROCEDURE — 96361 HYDRATE IV INFUSION ADD-ON: CPT

## 2018-04-16 PROCEDURE — 99285 EMERGENCY DEPT VISIT HI MDM: CPT | Mod: 25

## 2018-04-16 PROCEDURE — 80053 COMPREHEN METABOLIC PANEL: CPT | Performed by: EMERGENCY MEDICINE

## 2018-04-16 PROCEDURE — 00000146 ZZHCL STATISTIC GLUCOSE BY METER IP

## 2018-04-16 PROCEDURE — 99223 1ST HOSP IP/OBS HIGH 75: CPT | Mod: AI | Performed by: PEDIATRICS

## 2018-04-16 PROCEDURE — 99207 ZZC OFFICE-HOSPITAL ADMIT: CPT | Performed by: FAMILY MEDICINE

## 2018-04-16 PROCEDURE — 85610 PROTHROMBIN TIME: CPT | Performed by: EMERGENCY MEDICINE

## 2018-04-16 PROCEDURE — 25000128 H RX IP 250 OP 636: Performed by: EMERGENCY MEDICINE

## 2018-04-16 PROCEDURE — 99285 EMERGENCY DEPT VISIT HI MDM: CPT | Mod: Z6 | Performed by: EMERGENCY MEDICINE

## 2018-04-16 PROCEDURE — 82803 BLOOD GASES ANY COMBINATION: CPT

## 2018-04-16 PROCEDURE — 96374 THER/PROPH/DIAG INJ IV PUSH: CPT | Mod: 59

## 2018-04-16 PROCEDURE — 85025 COMPLETE CBC W/AUTO DIFF WBC: CPT | Performed by: EMERGENCY MEDICINE

## 2018-04-16 PROCEDURE — 85730 THROMBOPLASTIN TIME PARTIAL: CPT | Performed by: EMERGENCY MEDICINE

## 2018-04-16 PROCEDURE — 25000128 H RX IP 250 OP 636: Performed by: PHYSICIAN ASSISTANT

## 2018-04-16 PROCEDURE — 74177 CT ABD & PELVIS W/CONTRAST: CPT

## 2018-04-16 PROCEDURE — 83605 ASSAY OF LACTIC ACID: CPT

## 2018-04-16 PROCEDURE — 74178 CT ABD&PLV WO CNTR FLWD CNTR: CPT

## 2018-04-16 RX ORDER — TAMSULOSIN HYDROCHLORIDE 0.4 MG/1
0.4 CAPSULE ORAL DAILY
Status: DISCONTINUED | OUTPATIENT
Start: 2018-04-16 | End: 2018-04-18 | Stop reason: HOSPADM

## 2018-04-16 RX ORDER — IOPAMIDOL 755 MG/ML
111 INJECTION, SOLUTION INTRAVASCULAR ONCE
Status: COMPLETED | OUTPATIENT
Start: 2018-04-16 | End: 2018-04-16

## 2018-04-16 RX ORDER — LISINOPRIL 10 MG/1
10 TABLET ORAL DAILY
Status: DISCONTINUED | OUTPATIENT
Start: 2018-04-17 | End: 2018-04-18 | Stop reason: HOSPADM

## 2018-04-16 RX ORDER — NICOTINE POLACRILEX 4 MG
15-30 LOZENGE BUCCAL
Status: DISCONTINUED | OUTPATIENT
Start: 2018-04-16 | End: 2018-04-18 | Stop reason: HOSPADM

## 2018-04-16 RX ORDER — GABAPENTIN 600 MG/1
600 TABLET ORAL AT BEDTIME
Status: DISCONTINUED | OUTPATIENT
Start: 2018-04-16 | End: 2018-04-18 | Stop reason: HOSPADM

## 2018-04-16 RX ORDER — SODIUM CHLORIDE, SODIUM LACTATE, POTASSIUM CHLORIDE, CALCIUM CHLORIDE 600; 310; 30; 20 MG/100ML; MG/100ML; MG/100ML; MG/100ML
INJECTION, SOLUTION INTRAVENOUS CONTINUOUS
Status: DISCONTINUED | OUTPATIENT
Start: 2018-04-16 | End: 2018-04-18

## 2018-04-16 RX ORDER — HYDROMORPHONE HYDROCHLORIDE 1 MG/ML
.3-.5 INJECTION, SOLUTION INTRAMUSCULAR; INTRAVENOUS; SUBCUTANEOUS
Status: DISCONTINUED | OUTPATIENT
Start: 2018-04-16 | End: 2018-04-17

## 2018-04-16 RX ORDER — DEXTROSE MONOHYDRATE 25 G/50ML
25-50 INJECTION, SOLUTION INTRAVENOUS
Status: DISCONTINUED | OUTPATIENT
Start: 2018-04-16 | End: 2018-04-18 | Stop reason: HOSPADM

## 2018-04-16 RX ORDER — NALOXONE HYDROCHLORIDE 0.4 MG/ML
.1-.4 INJECTION, SOLUTION INTRAMUSCULAR; INTRAVENOUS; SUBCUTANEOUS
Status: DISCONTINUED | OUTPATIENT
Start: 2018-04-16 | End: 2018-04-18 | Stop reason: HOSPADM

## 2018-04-16 RX ORDER — METOPROLOL SUCCINATE 50 MG/1
50 TABLET, EXTENDED RELEASE ORAL 2 TIMES DAILY
Status: DISCONTINUED | OUTPATIENT
Start: 2018-04-16 | End: 2018-04-18 | Stop reason: HOSPADM

## 2018-04-16 RX ORDER — SODIUM CHLORIDE, SODIUM LACTATE, POTASSIUM CHLORIDE, CALCIUM CHLORIDE 600; 310; 30; 20 MG/100ML; MG/100ML; MG/100ML; MG/100ML
1000 INJECTION, SOLUTION INTRAVENOUS CONTINUOUS
Status: DISCONTINUED | OUTPATIENT
Start: 2018-04-16 | End: 2018-04-16

## 2018-04-16 RX ORDER — MULTIPLE VITAMINS W/ MINERALS TAB 9MG-400MCG
1 TAB ORAL DAILY
Status: DISCONTINUED | OUTPATIENT
Start: 2018-04-17 | End: 2018-04-18 | Stop reason: HOSPADM

## 2018-04-16 RX ADMIN — SODIUM CHLORIDE, POTASSIUM CHLORIDE, SODIUM LACTATE AND CALCIUM CHLORIDE 1000 ML: 600; 310; 30; 20 INJECTION, SOLUTION INTRAVENOUS at 10:55

## 2018-04-16 RX ADMIN — IOPAMIDOL 111 ML: 755 INJECTION, SOLUTION INTRAVENOUS at 12:18

## 2018-04-16 RX ADMIN — HYDROMORPHONE HYDROCHLORIDE 0.5 MG: 1 INJECTION, SOLUTION INTRAMUSCULAR; INTRAVENOUS; SUBCUTANEOUS at 10:56

## 2018-04-16 RX ADMIN — HYDROMORPHONE HYDROCHLORIDE 0.5 MG: 1 INJECTION, SOLUTION INTRAMUSCULAR; INTRAVENOUS; SUBCUTANEOUS at 23:09

## 2018-04-16 RX ADMIN — HYDROMORPHONE HYDROCHLORIDE 0.5 MG: 1 INJECTION, SOLUTION INTRAMUSCULAR; INTRAVENOUS; SUBCUTANEOUS at 14:35

## 2018-04-16 RX ADMIN — GABAPENTIN 600 MG: 600 TABLET, FILM COATED ORAL at 22:01

## 2018-04-16 RX ADMIN — METOPROLOL SUCCINATE 50 MG: 50 TABLET, EXTENDED RELEASE ORAL at 20:01

## 2018-04-16 ASSESSMENT — ENCOUNTER SYMPTOMS
VOMITING: 0
NAUSEA: 0
FEVER: 0
APPETITE CHANGE: 1
SHORTNESS OF BREATH: 1
ABDOMINAL PAIN: 1

## 2018-04-16 ASSESSMENT — PAIN DESCRIPTION - DESCRIPTORS: DESCRIPTORS: ACHING;SHARP

## 2018-04-16 NOTE — ED NOTES
Pt BIBA from clinic with abdominal pain to [facility], denies nausea. Pt reports hx pancreatitis with admission to this hospital earlier this month.

## 2018-04-16 NOTE — IP AVS SNAPSHOT
MRN:[id number]                      After Visit Summary   4/16/2018    Antoine Russo    MRN: [id number]           Thank you!     Thank you for choosing [facility] for your care. Our goal is always to provide you with excellent care. Hearing back from our patients is one way we can continue to improve our services. Please take a few minutes to complete the written survey that you may receive in the mail after you visit with us. Thank you!        Patient Information     Date Of Birth          1945        Designated Caregiver       Most Recent Value    Caregiver    Will someone help with your care after discharge? 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 You underwent EGD and stent was removed. You did well and were tolerating a low fat diet before discharge                  Who to Call     For medical emergencies, please call 911.  For non-urgent questions about your medical care, please call your primary care provider or clinic, [phone]  For questions related to your surgery, please call your surgery clinic        Attending Provider     Provider Garo Palmer MD Emergency Medicine    Zac Pool MD Internal Medicine    [facility], Waleska Powell MD Internal Medicine       Primary Care Provider Office Phone # Fax #    Katie Gross [phone]       When to contact your care team       Call or return if you develop uncontrolled pain, N/V, fever >101, confusion, or other symptoms of concern to you                  After Care Instructions     Activity       Your activity upon discharge: activity as tolerated, no driving while on opiates            Diet       Follow this diet upon discharge: Orders Placed This  Encounter      Calorie Counts      Snacks/Supplements Adult: With Meals      Low Fat Diet            Discharge Instructions       1. Continue low fat diet on discharge  2. Follow up with GI, your PCP, and nutrition following discharge  3. No driving while on opiate pain medications                  Follow-up Appointments     Adult [facility] Follow-up and recommended labs and tests       Follow up with primary care provider, Katie Gross, within 7 days for hospital follow- up.  The following labs/tests are recommended: INR.    Follow up with Coumadin clinic in the next three days.    Follow up with nutrition consult outpatient    Follow up with GI as already scheduled 5/7/2018      Appointments on [place] and/or [facility] (with [facility] or [facility] provider or service). Call [phone] if you haven't heard regarding these appointments within 7 days of discharge.                  Your next 10 appointments already scheduled     Apr 26, 2018  9:45 AM CDT   Anticoagulation Visit with NB ANTI COAG   [facility]    [address] [place] [address] [phone]            May 07, 2018  8:20 AM CDT   (Arrive by 8:05 AM)   CT ABDOMEN PELVIS W/O & W CONTRAST with UCCT2   [facility] ([facility])    [address]  1st Floor  [facility] [address] [phone]           Please bring any scans or X-rays taken at other hospitals, if similar tests were done. Also bring a list of your medicines, including vitamins, minerals and over-the-counter drugs. It is safest to leave personal items at home.  Be sure to tell your doctor:   If you have any allergies.   If there s any chance you are pregnant.   If you are breastfeeding.  You may have contrast for this exam. To prepare:   Do not eat or drink for 2 hours before your exam. If you need to take medicine, you may take it with small sips of water. (We may ask you  "to take liquid medicine as well.)   The day before your exam, drink extra fluids at least six 8-ounce glasses (unless your doctor tells you to restrict your fluids).   You will be given instructions on how to drink the contrast.  Patients over 70 or patients with diabetes or kidney problems:   If you haven t had a blood test (creatinine test) within the last 30 days, the Cardiologist/Radiologist may require you to get this test prior to your exam.  If you have diabetes:   Continue to take your metformin medication on the day of your exam  Please wear loose clothing, such as a sweat suit or jogging clothes. Avoid snaps, zippers and other metal. We may ask you to undress and put on a hospital gown.  If you have any questions, please call the Imaging Department where you will have your exam.            May 07, 2018 11:00 AM CDT   (Arrive by 10:45 AM)   Return Visit with Yovanny Beasley MD   [facility] ([facility])    [address] [place] [address] [phone]              Additional Services     Nutrition Referral                 Pending Results     Date and Time Order Name Status Description    4/18/2018 0936 INR In process             Statement of Approval     Ordered          04/18/18 1125  I have reviewed and agree with all the recommendations and orders detailed in this document.  EFFECTIVE NOW     Approved and electronically signed by:  Deb Pichardo PA-C             Admission Information     Date & Time Provider Department Dept. Phone    4/16/2018 Waleska Chavez MD [address] BMT [facility] [place] [phone]      Your Vitals Were     Blood Pressure Pulse Temperature Respirations Height Weight    115/83 (BP Location: Right arm) 120 98.8  F (37.1  C) (Axillary) 18 1.745 m (5' 8.7\") 83.2 kg (183 lb 6.4 oz)    Pulse Oximetry BMI (Body Mass Index)                94% 27.32 kg/m2          MyChart Information     MyChart gives " you secure access to your electronic health record. If you see a primary care provider, you can also send messages to your care team and make appointments. If you have questions, please call your primary care clinic.  If you do not have a primary care provider, please call [phone] and they will assist you.        Care EveryWhere ID     This is your Care EveryWhere ID. This could be used by other organizations to access your [facility] medical records  [id number]        Equal Access to Services     ABBEY WALTON : Hadbrian matthew hadestrellao Somartinali, waaxda luqadaha, qaybta kaalmada adeclaudiayada, koffi fay. So [facility] [phone].    ATENCIÓN: Si habla español, tiene a snyder disposición servicios gratuitos de asistencia lingüística. [facility] [phone].    We comply with applicable federal civil rights laws and [place] laws. We do not discriminate on the basis of race, color, national origin, age, disability, sex, sexual orientation, or gender identity.               Review of your medicines      START taking        Dose / Directions    HYDROmorphone 2 MG tablet   Commonly known as:  DILAUDID   Used for:  Acute recurrent pancreatitis        Dose:  2 mg   Take 1 tablet (2 mg) by mouth every 3 hours as needed for moderate to severe pain   Quantity:  10 tablet   Refills:  0       polyethylene glycol Packet   Commonly known as:  MIRALAX/GLYCOLAX   Used for:  Constipation, unspecified constipation type        Dose:  17 g   Take 17 g by mouth daily as needed for constipation   Quantity:  7 packet   Refills:  0         CONTINUE these medicines which may have CHANGED, or have new prescriptions. If we are uncertain of the size of tablets/capsules you have at home, strength may be listed as something that might have changed.        Dose / Directions    multivitamin, therapeutic with minerals Tabs tablet   This may have changed:  when to take this   Used for:  Surgery aftercare        Dose:  1 tablet    Take 1 tablet by mouth daily.   Quantity:  30 each   Refills:  1         CONTINUE these medicines which have NOT CHANGED        Dose / Directions    ASPIRIN NOT PRESCRIBED   Commonly known as:  INTENTIONAL        Antiplatelet medication not prescribed intentionally due to Current anticoagulant therapy (warfarin/enoxaparin)   Refills:  0       gabapentin 300 MG capsule   Commonly known as:  NEURONTIN   Used for:  Type 2 diabetes mellitus with diabetic polyneuropathy, without long-term current use of insulin (H)        Take 2 tablest (600 mg) every night   Quantity:  180 capsule   Refills:  1       glipiZIDE 2.5 MG 24 hr tablet   Commonly known as:  glipiZIDE XL   Used for:  Type 2 diabetes mellitus with diabetic polyneuropathy, without long-term current use of insulin (H)        Dose:  2.5 mg   Take 1 tablet (2.5 mg) by mouth every morning   Quantity:  90 tablet   Refills:  3       lisinopril 10 MG tablet   Commonly known as:  PRINIVIL/ZESTRIL   Used for:  Persistent proteinuria        Dose:  10 mg   Take 1 tablet (10 mg) by mouth daily   Quantity:  90 tablet   Refills:  1       metFORMIN 500 MG 24 hr tablet   Commonly known as:  GLUCOPHAGE-XR   Used for:  Type 2 diabetes mellitus with diabetic polyneuropathy, without long-term current use of insulin (H)        Dose:  1000 mg   Take 2 tablets (1,000 mg) by mouth 2 times daily (with meals)   Quantity:  360 tablet   Refills:  1       metoprolol succinate 50 MG 24 hr tablet   Commonly known as:  TOPROL-XL   Used for:  Hypertension, benign essential, goal below 140/90        Dose:  50 mg   Take 1 tablet (50 mg) by mouth 2 times daily   Quantity:  180 tablet   Refills:  3       tamsulosin 0.4 MG capsule   Commonly known as:  FLOMAX   Used for:  Urgency of urination, Hypertrophy of prostate without urinary obstruction        Dose:  0.4 mg   Take 1 capsule (0.4 mg) by mouth daily   Quantity:  90 capsule   Refills:  3       warfarin 2.5 MG tablet   Commonly known as:   COUMADIN   Used for:  Chronic atrial fibrillation (H)        1.25 mg Tues/Sat and 2.5 mg all other days or As directed by Anticoagulation Clinic   Quantity:  90 tablet   Refills:  0            Where to get your medicines      These medications [id number] [place] [place] [facility] [address] [facility] [facility]  [address] , [facility] [address]     Phone:  [phone]     polyethylene glycol Packet         Some of these will need a paper prescription and others can be bought over the counter. Ask your nurse if you have questions.     Bring a paper prescription for each of these medications     HYDROmorphone 2 MG tablet                Protect others around you: Learn how to safely use, store and throw away your medicines at www.disposemymeds.org.        Information about OPIOIDS     PRESCRIPTION OPIOIDS: WHAT YOU NEED TO KNOW   You have a prescription for an opioid (narcotic) pain medicine. Opioids can cause addiction. If you have a history of chemical dependency of any type, you are at a higher risk of becoming addicted to opioids. Only take this medicine after all other options have been tried. Take it for as short a time and as few doses as possible.     Do not:    Drive. If you drive while taking these medicines, you could be arrested for driving under the influence (DUI).    Operate heavy machinery    Do any other dangerous activities while taking these medicines.     Drink any alcohol while taking these medicines.      Take with any other medicines that contain acetaminophen. Read all labels carefully. Look for the word  acetaminophen  or  Tylenol.  Ask your pharmacist if you have questions or are unsure.    Store your pills in a secure place, locked if possible. We will not replace any lost or stolen medicine. If you don t finish your medicine, please throw away (dispose) as directed by your pharmacist. The [place] [employer]ution [employer] has more information about safe disposal:  https://www.pca.[facility].us/living-green/managing-unwanted-medications    All opioids tend to cause constipation. Drink plenty of water and eat foods that have a lot of fiber, such as fruits, vegetables, prune juice, apple juice and high-fiber cereal. Take a laxative (Miralax, milk of magnesia, Colace, Senna) if you don t move your bowels at least every other day.              Medication List: This is a list of all your medications and when to take them. Check marks below indicate your daily home schedule. Keep this list as a reference.      Medications           Morning Afternoon Evening Bedtime As Needed    ASPIRIN NOT PRESCRIBED   Commonly known as:  INTENTIONAL   Antiplatelet medication not prescribed intentionally due to Current anticoagulant therapy (warfarin/enoxaparin)                                gabapentin 300 MG capsule   Commonly known as:  NEURONTIN   Take 2 tablest (600 mg) every night                                glipiZIDE 2.5 MG 24 hr tablet   Commonly known as:  glipiZIDE XL   Take 1 tablet (2.5 mg) by mouth every morning                                HYDROmorphone 2 MG tablet   Commonly known as:  DILAUDID   Take 1 tablet (2 mg) by mouth every 3 hours as needed for moderate to severe pain   Last time this was given:  2 mg on 4/18/2018  8:08 AM                                lisinopril 10 MG tablet   Commonly known as:  PRINIVIL/ZESTRIL   Take 1 tablet (10 mg) by mouth daily   Last time this was given:  10 mg on 4/18/2018  7:57 AM                                metFORMIN 500 MG 24 hr tablet   Commonly known as:  GLUCOPHAGE-XR   Take 2 tablets (1,000 mg) by mouth 2 times daily (with meals)                                metoprolol succinate 50 MG 24 hr tablet   Commonly known as:  TOPROL-XL   Take 1 tablet (50 mg) by mouth 2 times daily   Last time this was given:  50 mg on 4/18/2018  7:58 AM                                multivitamin, therapeutic with minerals Tabs tablet   Take 1 tablet by  mouth daily.   Last time this was given:  1 tablet on 4/18/2018  7:57 AM                                polyethylene glycol Packet   Commonly known as:  MIRALAX/GLYCOLAX   Take 17 g by mouth daily as needed for constipation                                tamsulosin 0.4 MG capsule   Commonly known as:  FLOMAX   Take 1 capsule (0.4 mg) by mouth daily   Last time this was given:  0.4 mg on 4/18/2018  7:58 AM                                warfarin 2.5 MG tablet   Commonly known as:  COUMADIN   1.25 mg Tues/Sat and 2.5 mg all other days or As directed by Anticoagulation Clinic   Last time this was given:  1.25 mg on 4/17/2018  6:49 PM

## 2018-04-16 NOTE — CONSULTS
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 Patient was recently admitted 3/29/18-4/5/18 recurrent necrotizing pancreatitis had ERCP with pancreatic stent placed on 3/29/18 developed post ERCP pancreatitis with lipase [id number].    Procedures:   3/29/18: EUS to look for recurrent IPMN. An anechoic irregular collection was identified in pancreatic body just upstream of distal pancreatectomy resection margin, very small and along the staple line, c/w a recurrent tail leak with surrounding inflammation.       3/29/18: ERCP: one 5 Fr x 3 cm Sofflex plastic stent placed into the pancreatic duct to drain small collections in tail via duct decompression. A 10 mm biliary sphincterotomy performed to open the entire sphincter, no pancreatic sphincterotomy was performed at the time.        Acute necrotizing pancreatitis with acute peripancreatic necrotic fluid collection: initial presentation 3/29/18, likely due to post ERCP. Lipase up to 310[address] on 3/31/18, down to 1375 4/16/18. Able to tolerate some PO diet. Afebrile, and leukocytosis trending down from 34 on 4/1/18 to 18.5 today. Residual leukocytosis likely from ongoing inflammation from necrotizing pancreatitis. CT showed new evidence of involving necrotizing pancreatitis, appeared to be acute fluid collection and no walled off necrosis noted. BISAPs score 2 (for SIRS and Age).       Recommendations  - Advance diet as tolerates to low fat diet   - Nutrition consult and start calorie count  (patient will need diet education before discharge, he does not know the criteria for low fat diet)   - Keep NPO after midnight in case procedure needed for tomorrow  - Keep LR @ 200 cc/hr   - Monitor for fever curve, respiratory status and blood pressure.  - Trend LFTs, CBC with diff and BMP  - Agree to hold coumadin. Please trend INR.     Thank you for involving us in this patient's care. Please do not hesitate to contact the GI service with any questions or concerns.     Pt care plan discussed with Dr. Plata, GI staff physician.    Abelardo Carnes  -------------------------------------------------------------------------------------------------------------------          Chief Complaint:   We were asked by Dr. Pichardo of internal medicine to evaluate this patient with acute necrotizing pancreatitis.     History is obtained from the patient and the medical record.          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 Patient was discharged on 4/4/18 from previous admission. Since discharge, he developed recurrent epigastric pain worsen with PO intakes over the last 1-2 weeks. He was able to tolerate solid food, including sandwiches, and salads, has tried to stay on low fat diet, but developed worsening epigastric pain over the last 1-2 weeks. He denied nausea, vomiting, melena, hematochezia. Reports chronic diarrhea with 4-5 loose BM daily.               Past Medical History:   Reviewed and edited as appropriate  Past Medical History:   Diagnosis Date     C. 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     Noninfectious ileitis      Recurrent pancreatitis (H)             Past Surgical History:   Reviewed and edited as appropriate   Past Surgical History:   Procedure Laterality Date     BREAST SURGERY  2008    right breast mass benign     COLONOSCOPY      multiple polyps removed     COLONOSCOPY  8/24/2011    Procedure:COMBINED COLONOSCOPY, REMOVE TUMOR/POLYP/LESION BY SNARE; Surgeon:MILEY ARBOLEDA; Location:[facility]     COLONOSCOPY  12/17/2012    Procedure: COLONOSCOPY;;  Surgeon: Leon Maurer MD;  [facility]     COLONOSCOPY  12/18/2012    Procedure: COLONOSCOPY;;  Surgeon: Leon Maurer MD;  [facility]     DENERVATION OF SPERMATIC CORD MICROSURGICAL Left 5/23/2017    Procedure: DENERVATION OF SPERMATIC CORD MICROSURGICAL;;  Surgeon: Marcio Aggarwal MD;  [facility]     DISSECTION RADICAL NECK BILATERAL  8/2/2012    Procedure: DISSECTION RADICAL NECK BILATERAL;;  Surgeon: Yung Alvares MD;  [facility]     ENDOSCOPIC RETROGRADE CHOLANGIOPANCREATOGRAM N/A 5/10/2016    Procedure: COMBINED ENDOSCOPIC RETROGRADE CHOLANGIOPANCREATOGRAPHY, PLACE TUBE/STENT;  Surgeon: Yovanny Beasley MD;  [facility]     ENDOSCOPIC RETROGRADE CHOLANGIOPANCREATOGRAM N/A 3/29/2018    Procedure: ENDOSCOPIC RETROGRADE CHOLANGIOPANCREATOGRAM;  Endoscopic Retrograde Cholangiopancreatogram, Endoscopic Ultrasound, Biliary Sphincterotomy, Biliary and Pancreatic Stent Placement;  Surgeon: Yovanny Beasley MD;  [facility]     ENDOSCOPIC ULTRASOUND UPPER GASTROINTESTINAL TRACT (GI) N/A 2/3/2016    Procedure: ENDOSCOPIC ULTRASOUND, ESOPHAGOSCOPY / UPPER GASTROINTESTINAL TRACT (GI);  Surgeon: Grabiel Plata MD;  [facility]     ENDOSCOPIC ULTRASOUND UPPER GASTROINTESTINAL TRACT (GI) N/A 3/29/2018     Procedure: ENDOSCOPIC ULTRASOUND, ESOPHAGOSCOPY / UPPER GASTROINTESTINAL TRACT (GI);;  Surgeon: Grabiel Plata MD;  [facility]     ESOPHAGOSCOPY, GASTROSCOPY, DUODENOSCOPY (EGD), COMBINED N/A 2/3/2016    Procedure: COMBINED ENDOSCOPIC ULTRASOUND, ESOPHAGOSCOPY, GASTROSCOPY, DUODENOSCOPY (EGD), FINE NEEDLE ASPIRATE/BIOPSY;  Surgeon: Grabiel Plata MD;  [facility]     ESOPHAGOSCOPY, GASTROSCOPY, DUODENOSCOPY (EGD), COMBINED N/A 6/8/2016    Procedure: COMBINED ESOPHAGOSCOPY, GASTROSCOPY, DUODENOSCOPY (EGD), REMOVE FOREIGN BODY;  Surgeon: Yovanny Beasley MD;  [facility]     EXCISE LESION INTRAORAL  6/14/2012    Procedure: EXCISE LESION INTRAORAL;  Wide Local Excision Floor of Mouth, Direct Laryngoscopy, Bilateral Ida's Marsuplization, Split Thickness Skin Graft from right Thigh  Latex Safe;  Surgeon: Gerson Ravi MD;  [facility]     EXCISE LESION INTRAORAL  8/2/2012    Procedure: EXCISE LESION INTRAORAL;  Floor of Mouth Resection, Bilateral Selective Radical Neck Dissection, Tracheostomy, Left Radial Forearm  Free Flap with Alloderm, Nasogastric Feeding Tube Placement,    * Latex Safe*;  Surgeon: Gerson Ravi MD;  [facility]     EXCISE LESION INTRAORAL  12/11/2012    Procedure: EXCISE LESION INTRAORAL;  takedown of oral flap;  Surgeon: Yung Alvares MD;  [facility]     GRAFT FREE VASCULARIZED (LOCATION)  8/2/2012    Procedure: GRAFT FREE VASCULARIZED (LOCATION);;  Surgeon: Yung Alvares MD;  [facility]     GRAFT SKIN SPLIT THICKNESS FROM EXTREMITY  6/14/2012    Procedure: GRAFT SKIN SPLIT THICKNESS FROM EXTREMITY;;  Surgeon: Gerson Ravi MD;  [facility]     LAPAROSCOPIC ILEOSTOMY TAKEDOWN  6/6/2013    Procedure: LAPAROSCOPIC ILEOSTOMY TAKEDOWN;  Laparoscopic Closure of Enterostomy, Guerda's Type with IleoRectal Anastomosis ;  Surgeon: Grabiel Riddle MD;  [facility]     LAPAROTOMY EXPLORATORY  12/20/2012    Procedure: LAPAROTOMY  EXPLORATORY;  Exploratory Laparotomy, total abdominal colectomy, ileostomy formation;  Surgeon: Miquel Cannon MD;  [facility]     LARYNGOSCOPY  2012    Procedure: LARYNGOSCOPY;;  Surgeon: Gerson Ravi MD;  [facility]     ORTHOPEDIC SURGERY      ganglian cyst left ankle     PANCREATECTOMY, SPLENECTOMY N/A 3/10/2016    Procedure: PANCREATECTOMY, SPLENECTOMY;  Surgeon: Nael Abel MD;  [facility]     SHOULDER SURGERY  ,     2006- right rotator cuff,  bone spur on left. Dr. Hdez     VARICOCELECTOMY Left 2017    Procedure: VARICOCELECTOMY;  Left Varicocele Repair, Denervation of Left Testis;  Surgeon: Marcio Aggarwal MD;  [facility]              Social History:     Social History Main Topics     Smoking status: Former Smoker     Packs/day: 1.00     Years: 40.00     Types: Cigarettes     Quit date: 2006     Smokeless tobacco: Never Used     Alcohol use No     Drug use: No     Sexual activity: Yes     Partners: Female            Family History:   Reviewed and edited as appropriate  Family History   Problem Relation Age of Onset     DIABETES Sister      onset age 50     Alzheimer Disease Mother       80     Alzheimer Disease Father       85     DIABETES Other      nephew type 1     DIABETES Other      Anesthesia Reaction No family hx of      Colon Cancer No family hx of      Colon Polyps No family hx of      Crohn Disease No family hx of      Ulcerative Colitis No family hx of      No known history of gastrointestinal/liver disease or  gastrointestinal malignancies       Allergies:   Reviewed and edited as appropriate     Allergies   Allergen Reactions     Nkda [No Known Drug Allergies]             Medications:     Current Facility-Administered Medications   Medication     lactated ringers infusion     sodium chloride 0.9 % bag 500mL for CT scan flush use     glucose gel 15-30 g    Or     dextrose 50 % injection 25-50 mL    Or     glucagon injection 1 mg  "    insulin aspart (NovoLOG) inj (RAPID ACTING)     insulin aspart (NovoLOG) inj (RAPID ACTING)     gabapentin (NEURONTIN) tablet 600 mg     [START ON 4/17/2018] lisinopril (PRINIVIL/ZESTRIL) tablet 10 mg     metoprolol succinate (TOPROL-XL) 24 hr tablet 50 mg     [START ON 4/17/2018] multivitamin, therapeutic with minerals (THERA-VIT-M) tablet 1 tablet     tamsulosin (FLOMAX) capsule 0.4 mg     lactated ringers infusion     HYDROmorphone (PF) (DILAUDID) injection 0.3-0.5 mg     naloxone (NARCAN) injection 0.1-0.4 mg             Review of Systems:   A complete review of systems was performed and is negative except as noted in the HPI           Physical Exam:   /72  Pulse 120  Temp 98.5  F (36.9  C) (Oral)  Resp 20  Ht 5' 10\" (1.778 m)  SpO2 99%  BMI 26.06 kg/m2  Wt:   Wt Readings from Last 2 Encounters:   04/16/18 181 lb 9.6 oz (82.4 kg)   04/02/18 203 lb 9.6 oz (92.4 kg)      Constitutional: cooperative, pleasant, not dyspneic/diaphoretic, no acute distress  Eyes: Sclera anicteric/injected  Ears/nose/mouth/throat: Normal oropharynx without ulcers or exudate, mucus membranes moist, hearing intact  Neck: supple, thyroid normal size  CV: No edema  Respiratory: Unlabored breathing  Lymph: No axillary, submandibular, supraclavicular or inguinal lymphadenopathy  Abd: Nondistended, +bs, no hepatosplenomegaly, nontender, no peritoneal signs  Skin: warm, perfused, no jaundice  Neuro: AAO x 3, No asterixis  Psych: Normal affect  MSK: No gross deformities         Data:   Labs and imaging below were independently reviewed and interpreted    BMP  Recent Labs  Lab 04/16/18  1040      POTASSIUM 5.2   CHLORIDE 102   NILSON 10.1   CO2 23   BUN 16   CR 1.08   *     CBC  Recent Labs  Lab 04/16/18  1040   WBC 18.5*   RBC 4.18*   HGB 13.3   HCT 41.5   MCV 99   MCH 31.8   MCHC 32.0   RDW 15.2*   *     INR  Recent Labs  Lab 04/16/18  1040   INR 2.59*     LFTs  Recent Labs  Lab 04/16/18  1040   ALKPHOS 84 "   AST 17   ALT 16   BILITOTAL 0.5   PROTTOTAL 7.4   ALBUMIN 2.7*      PANC  Recent Labs  Lab 04/16/18  1040   LIPASE 1375*       Imaging:  CT abdomen/pelvis with and w/o contrast 4/16/18:   IMPRESSION:   1. Pancreatitis with new evidence of pancreatic necrosis involving the  pancreatic head/uncinate process, with 5.2 cm acute necrotic  collection along the uncinate process. Additionally there are necrotic  peripancreatic lymph nodes.  2. Pancreatic duct stent is present. No evidence of pancreatic ductal dilatation.  3. Hypoattenuating area along the distal pancreatic resection margin  is slightly decreased in size compared to 3/30/2018.

## 2018-04-16 NOTE — MR AVS SNAPSHOT
After Visit Summary   4/16/2018    Antoine Russo    MRN: [id number]           Patient Information     Date Of Birth          1945        Visit Information        Provider Department      4/16/2018 8:00 AM Katie Gross MD [facility]        Today's Diagnoses     Acute biliary pancreatitis, unspecified complication status    -  1    Chronic atrial fibrillation (H)        Hypertension, benign essential, goal below 140/90        Type 2 diabetes mellitus with diabetic polyneuropathy, without long-term current use of insulin (H)          Care Instructions    Wt Readings from Last 5 Encounters:   04/16/18 181 lb 9.6 oz (82.4 kg)   04/02/18 203 lb 9.6 oz (92.4 kg)   03/30/18 200 lb (90.7 kg)   03/29/18 200 lb 13.4 oz (91.1 kg)   03/16/18 198 lb (89.8 kg)      Back to hospital          Follow-ups after your visit        Your next 10 appointments already scheduled     Apr 17, 2018  9:45 AM CDT   Anticoagulation Visit with [facility] COAG   [facility]    [address] [address]   [phone]            Apr 26, 2018  9:45 AM CDT   Anticoagulation Visit with [place] ANTI COAG   [facility] ([facility])    [address] [place] [id number]   [phone]            May 07, 2018  8:20 AM CDT   (Arrive by 8:05 AM)   CT ABDOMEN PELVIS W/O & W CONTRAST with UCCT2   [facility] ([facility])    [address] [address] [phone]           Please bring any scans or X-rays taken at other hospitals, if similar tests were done. Also bring a list of your medicines, including vitamins, minerals and over-the-counter drugs. It is safest to leave personal items at home.  Be sure to tell your doctor:   If you have any allergies.   If there s any chance you are pregnant.   If you are breastfeeding.  You may have  contrast for this exam. To prepare:   Do not eat or drink for 2 hours before your exam. If you need to take medicine, you may take it with small sips of water. (We may ask you to take liquid medicine as well.)   The day before your exam, drink extra fluids at least six 8-ounce glasses (unless your doctor tells you to restrict your fluids).   You will be given instructions on how to drink the contrast.  Patients over 70 or patients with diabetes or kidney problems:   If you haven t had a blood test (creatinine test) within the last 30 days, the Cardiologist/Radiologist may require you to get this test prior to your exam.  If you have diabetes:   Continue to take your metformin medication on the day of your exam  Please wear loose clothing, such as a sweat suit or jogging clothes. Avoid snaps, zippers and other metal. We may ask you to undress and put on a hospital gown.  If you have any questions, please call the Imaging Department where you will have your exam.            May 07, 2018 11:00 AM CDT   (Arrive by 10:45 AM)   Return Visit with Yovanny Beasley MD   [facility] ([facility])    [address] [place] [address] [phone]              Who to contact     If you have questions or need follow up information about today's clinic visit or your schedule please contact [facility] directly at [phone].  Normal or non-critical lab and imaging results will be communicated to you by MyChart, letter or phone within 4 business days after the clinic has received the results. If you do not hear from us within 7 days, please contact the clinic through MyChart or phone. If you have a critical or abnormal lab result, we will notify you by phone as soon as possible.  Submit refill requests through [employer] or call your pharmacy and they will forward the refill request to us. Please allow 3 business days for your refill to be  "completed.          Additional Information About Your Visit        [employer] Information     [employer] gives you secure access to your electronic health record. If you see a primary care provider, you can also send messages to your care team and make appointments. If you have questions, please call your primary care clinic.  If you do not have a primary care provider, please call [phone] and they will assist you.        Care EveryWhere ID     This is your Care EveryWhere ID. This could be used by other organizations to access your [facility] medical records  [id number]        Your Vitals Were     Pulse Temperature Height Pulse Oximetry BMI (Body Mass Index)       124 97.4  F (36.3  C) (Tympanic) 5' 10\" (1.778 m) 98% 26.06 kg/m2        Blood Pressure from Last 3 Encounters:   04/16/18 110/60   04/04/18 150/82   03/30/18 139/79    Weight from Last 3 Encounters:   04/16/18 181 lb 9.6 oz (82.4 kg)   04/02/18 203 lb 9.6 oz (92.4 kg)   03/30/18 200 lb (90.7 kg)              Today, you had the following     No orders found for display         Today's Medication Changes          These changes are accurate as of 4/16/18  8:25 AM.  If you have any questions, ask your nurse or doctor.               These medicines have changed or have updated prescriptions.        Dose/Directions    multivitamin, therapeutic with minerals Tabs tablet   This may have changed:  when to take this   Used for:  Surgery aftercare        Dose:  1 tablet   Take 1 tablet by mouth daily.   Quantity:  30 each   Refills:  1                Primary Care Provider Office Phone # Fax #    Katie Gross [phone]       [address] [place] [address]        Equal Access to Services     [facility] Center[facility] : Raquel Bernstein, charli block, koffi adler. So [facility] [phone].    ATENCIÓN: Si habla español, tiene a snyder disposición servicios gratuitos de asistencia " lingüística. Kia al [phone].    We comply with applicable federal civil rights laws and [place] laws. We do not discriminate on the basis of race, color, national origin, age, disability, sex, sexual orientation, or gender identity.            Thank you!     Thank you for choosing [facility]  for your care. Our goal is always to provide you with excellent care. Hearing back from our patients is one way we can continue to improve our services. Please take a few minutes to complete the written survey that you may receive in the mail after your visit with us. Thank you!             Your Updated Medication List - Protect others around you: Learn how to safely use, store and throw away your medicines at www.disposemymeds.org.          This list is accurate as of 4/16/18  8:25 AM.  Always use your most recent med list.                   Brand Name Dispense Instructions for use Diagnosis    ASPIRIN NOT PRESCRIBED    INTENTIONAL     Antiplatelet medication not prescribed intentionally due to Current anticoagulant therapy (warfarin/enoxaparin)        gabapentin 300 MG capsule    NEURONTIN    180 capsule    Take 2 tablest (600 mg) every night    Type 2 diabetes mellitus with diabetic polyneuropathy, without long-term current use of insulin (H)       glipiZIDE 2.5 MG 24 hr tablet    glipiZIDE XL    90 tablet    Take 1 tablet (2.5 mg) by mouth every morning    Type 2 diabetes mellitus with diabetic polyneuropathy, without long-term current use of insulin (H)       lisinopril 10 MG tablet    PRINIVIL/ZESTRIL    90 tablet    Take 1 tablet (10 mg) by mouth daily    Persistent proteinuria       metFORMIN 500 MG 24 hr tablet    GLUCOPHAGE-XR    360 tablet    Take 2 tablets (1,000 mg) by mouth 2 times daily (with meals)    Type 2 diabetes mellitus with diabetic polyneuropathy, without long-term current use of insulin (H)       metoprolol succinate 50 MG 24 hr tablet    TOPROL-XL    180 tablet    Take 1 tablet  (50 mg) by mouth 2 times daily    Hypertension, benign essential, goal below 140/90       multivitamin, therapeutic with minerals Tabs tablet     30 each    Take 1 tablet by mouth daily.    Surgery aftercare       tamsulosin 0.4 MG capsule    FLOMAX    90 capsule    Take 1 capsule (0.4 mg) by mouth daily    Urgency of urination, Hypertrophy of prostate without urinary obstruction       warfarin 2.5 MG tablet    COUMADIN    90 tablet    1.25 mg Tues/Sat and 2.5 mg all other days or As directed by Anticoagulation Clinic    Chronic atrial fibrillation (H)

## 2018-04-16 NOTE — ED NOTES
[facility], [place]   ED Nurse to Floor Handoff     Antoine Russo is a 72 year old male who speaks English and lives with a spouse,  in a home  They arrived in the ED by ambulance from clinic    ED Chief Complaint: Abdominal Pain    ED Dx;   Final diagnoses:   Acute recurrent pancreatitis         Needed?: No    Allergies:   Allergies   Allergen Reactions     Nkda [No Known Drug Allergies]    .  Past Medical Hx:   Past Medical History:   Diagnosis Date     C. 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     Noninfectious ileitis       Baseline Mental status: WDL  Current Mental Status changes: at basesline    Infection present or suspected this encounter: no  Sepsis suspected: No  Isolation type: No active isolations     Activity level - Baseline/Home:  Independent  Activity Level - Current:   Independent    Bariatric equipment needed?: No    In the ED these meds were given:   Medications   lactated ringers BOLUS 1,000 mL (1,000 mLs Intravenous New Bag 4/16/18 1055)     Followed by   lactated ringers infusion (not administered)   HYDROmorphone (DILAUDID) injection 1 mg (0.5 mg Intravenous Given 4/16/18 1056)       Drips running?  Yes -LR    Home pump  No    Current LDAs  Peripheral IV 04/16/18 Right Lower forearm (Active)   Line Status Infusing 4/16/2018 10:17 AM   Number of days:0       Ileostomy (Active)   Number of days:1942       Pressure Ulcer 12/30/12 Left Ear PU 2/2 O2 tubing (Active)   Number of days:1933       Pressure Ulcer 04/05/16 Bilateral Buttocks inner folds of butucks (Active)   Number of days:741       Incision/Surgical Site Anterior Mouth (Active)   Number of days:       Incision/Surgical Site 08/02/12 Bilateral Neck (Active)   Number of days:2083       Incision/Surgical Site 08/02/12 Left Arm  "(Active)   Number of days:2083       Incision/Surgical Site 08/02/12 Mid Neck (Active)   Number of days:2083       Incision/Surgical Site 08/02/12 Mouth (Active)   Number of days:2083       Incision/Surgical Site 12/11/12 Inner Mouth (Active)   Number of days:1952       Incision/Surgical Site Inner Mouth (Active)   Number of days:       Incision/Surgical Site 12/20/12 Anterior Abdomen (Active)   Number of days:1943       Incision/Surgical Site 06/06/13 Right Abdomen (Active)   Number of days:1775       Incision/Surgical Site 06/06/13 Abdomen (Active)   Number of days:1775       Incision/Surgical Site 05/23/17 Left Groin (Active)   Number of days:328       Labs results:   Labs Ordered and Resulted from Time of ED Arrival Up to the Time of Departure from the ED   CBC WITH PLATELETS DIFFERENTIAL - Abnormal; Notable for the following:        Result Value    WBC 18.5 (*)     RBC Count 4.18 (*)     RDW 15.2 (*)     Platelet Count 624 (*)     Absolute Neutrophil 14.9 (*)     All other components within normal limits   INR - Abnormal; Notable for the following:     INR 2.59 (*)     All other components within normal limits   PARTIAL THROMBOPLASTIN TIME - Abnormal; Notable for the following:     PTT 41 (*)     All other components within normal limits   COMPREHENSIVE METABOLIC PANEL - Abnormal; Notable for the following:     Glucose 216 (*)     Albumin 2.7 (*)     All other components within normal limits   LIPASE - Abnormal; Notable for the following:     Lipase 1375 (*)     All other components within normal limits   ISTAT  GASES LACTATE REAGAN POCT - Abnormal; Notable for the following:     PO2 Venous 48 (*)     All other components within normal limits   ISTAT CG4 GASES LACTATE REAGAN NURSING POCT       Imaging Studies: No results found for this or any previous visit (from the past 24 hour(s)).    Recent vital signs:   /72  Pulse 120  Temp 98.5  F (36.9  C) (Oral)  Resp 12  Ht 1.778 m (5' 10\")  SpO2 95%  BMI 26.06 " kg/m2    Cardiac Rhythm: A fib  Pt needs tele? Yes  Skin/wound Issues: None    Code Status: Full Code (prior)    Pain control: good    Nausea control: pt had none    Abnormal labs/tests/findings requiring intervention: elevated lipase    Family present during ED course? Yes   Family Comments/Social Situation comments: na    Tasks needing completion: None    Aurora Dwyer, RN    [phone] [facility]

## 2018-04-16 NOTE — PROGRESS NOTES
"  SUBJECTIVE:   Antoine Russo is a 72 year old male who presents to clinic today for the following health issues:        Hospital Follow-up Visit:    Hospital/Nursing Home/IP Rehab Facility: [facility]  Date of Admission: 3/30/2018  Date of Discharge: 4/4/2018  Reason(s) for Admission:   Pancreatitis,  Lactic Acidosis, Resolved  Atrial Fibrillation with RVR  HTN  DMII  Neuropathy  BPH            Problems taking medications regularly:  None       Medication changes since discharge: None       Problems adhering to non-medication therapy:  None    Summary of hospitalization:  [facility] discharge summary reviewed  Diagnostic Tests/Treatments reviewed.  Follow up needed: none  Other Healthcare Providers Involved in Patient s Care:         None  Update since discharge: worsened.     Post Discharge Medication Reconciliation: discharge medications reconciled, continue medications without change.  Plan of care communicated with patient     Coding guidelines for this visit:  Type of Medical   Decision Making Face-to-Face Visit       within 7 Days of discharge Face-to-Face Visit        within 14 days of discharge   Moderate Complexity [id number]   High Complexity [id number]          He was released from the hospital on the 5th for pancreatitis, and started to feel ill again on the 9th. He was released from the hospital and went to [place], but \"spent most of the time horizontal.\" He states that the pain is in the same location in his abdomen and hurts through to the back. Feels like the same pancreatitis. He plans to call the ambulance when he gets home today as he feels like he needs to go back in.   He states that he ate last night, and he did not have any nausea or vomiting. No black or bloody stools. He states that the pain got worse, and he currently rates the pain as 8/10.    Wt Readings from Last 5 Encounters:   04/16/18 82.4 kg (181 lb 9.6 oz)   04/02/18 92.4 kg (203 lb 9.6 oz) " "  03/30/18 90.7 kg (200 lb)   03/29/18 91.1 kg (200 lb 13.4 oz)   03/16/18 89.8 kg (198 lb)       Diabetes-  He is not testing his blood sugars at all. \  Lab Results   Component Value Date    A1C 7.7 03/16/2018    A1C 8.1 01/23/2018    A1C 7.0 10/10/2017    A1C 6.4 07/13/2017    A1C 8.9 04/04/2017        Problem list and histories reviewed & adjusted, as indicated.  Additional history: as documented    BP Readings from Last 3 Encounters:   04/16/18 110/60   04/04/18 150/82   03/30/18 139/79    Wt Readings from Last 3 Encounters:   04/16/18 82.4 kg (181 lb 9.6 oz)   04/02/18 92.4 kg (203 lb 9.6 oz)   03/30/18 90.7 kg (200 lb)              Reviewed and updated as needed this visit by clinical staff  Tobacco  Allergies  Meds  Med Hx  Surg Hx  Fam Hx  Soc Hx      Reviewed and updated as needed this visit by Provider         ROS:  CONSTITUTIONAL:NEGATIVE for fever, chills, POSITIVE  for  weight loss  CV: NEGATIVE for chest pain, palpitations or peripheral edema  GI: NEGATIVE for nausea, heartburn, or change in bowel habits and POSITIVE for epigastric pain.   : negative for dysuria, hematuria, decreased urinary stream  MUSCULOSKELETAL: NEGATIVE for significant arthralgias or myalgia    OBJECTIVE:   /60  Pulse 124  Temp 97.4  F (36.3  C) (Tympanic)  Ht 1.778 m (5' 10\")  Wt 82.4 kg (181 lb 9.6 oz)  SpO2 98%  BMI 26.06 kg/m2  Body mass index is 26.06 kg/(m^2).  GENERAL:fatigued, alert and no distress  NECK: no adenopathy, no asymmetry, masses, or scars and thyroid normal to palpation  RESP: lungs clear to auscultation - no rales, rhonchi or wheezes  CV: Tachycardic and Irregularly irregular, normal S1 S2, no S3 or S4, no murmur, click or rub, no peripheral edema  ABDOMEN: Marked epigastric tenderness, rebound tenderness, no peritoneal signs with markle sign negative, somewhat tense, multiple scars, no masses.  MS: no gross musculoskeletal defects noted, no edema      ASSESSMENT/PLAN:   Antoine was seen " today for \[facility] [place] [facility]\"" f/u.    Diagnoses and all orders for this visit:    Acute biliary pancreatitis, unspecified complication status  Just released on April 5th. Pain has returned.     Chronic atrial fibrillation (H)  Currently tachycardic and irregular.     Hypertension, benign essential, goal below 140/90  Controlled.     Type 2 diabetes mellitus with diabetic polyneuropathy, without long-term current use of insulin (H)  Not taking blood sugars.     Strongly encouraged patient to allow me to call the ambulance to take him to the [facility], but he does not want to leave his truck at the clinic. He insisted that he lives only 2 blocks from the clinic, and he doesn't want anyone driving his truck. He promised he would call as soon as he got home.     Patient Instructions     Wt Readings from Last 5 Encounters:   04/16/18 181 lb 9.6 oz (82.4 kg)   04/02/18 203 lb 9.6 oz (92.4 kg)   03/30/18 200 lb (90.7 kg)   03/29/18 200 lb 13.4 oz (91.1 kg)   03/16/18 198 lb (89.8 kg)      Back to hospital        This document serves as a record of the services and decisions personally performed and made by Katie Gross MD. It was created on her behalf by Ana Pereira, a trained medical scribe. The creation of this document is based the provider's statements to the medical scribe.  Ana Pereira 8:29 AM 4/16/2018    Provider:   The information in this document, created by the medical scribe for me, accurately reflects the services I personally performed and the decisions made by me. I have reviewed and approved this document for accuracy prior to leaving the patient care area.  Katie Gross MD 8:29 AM 4/16/2018    Katie Gross MD  [facility]

## 2018-04-16 NOTE — H&P
[facility]    Internal Medicine History and Physical - Gold Service       Date of Admission:  4/16/2018    Assessment & Plan   Antoine Russo is a 72 year old male with history of recurrent pancreatitis and IPMN (s/p distal pancreatectomy c/b pancreatic leak in 2016) s/p ERCP with biliary sphincterotomy and stent placement (CBD and PD to decompress leak) 3/29/2017, A fib with RVR, HTN, DMII, peripheral neuropathy, and BPH who was admitted on 4/16/2018 with acute pancreatitis. Of note, patient recently admitted 3/30 to 4/4 with acute pancreatitis with sepsis requiring ICU stay.     Acute recurrent pancreatitis and IPMN: H/o IPMN (s/p distal pancreatectomy c/b pancreatic leak in 2016) s/p ERCP with sphincterotomy and stent placement (CBD and PD to decompress leak) 3/29/2017. Had required repeat ERCPs over the past 4-5 months due to recurrent pancreatitis. Admitted 4/16 with abdominal pain c/w prior episodes. Sober since 1/2018. Lipase 1375, lactic acid normal. WBC 18.5. Tachycardic to 110s. Afebrile. BISAP score 2  - GI consulted due to complexity of recurrent pancreatitis   - NPO, LR at 200 cc/hr  - CT pending   - Will hold off on antibiotics for now  - Pain management with IV dilaudid while NPO  - Will likely consult nutrition when taking orals    Chronic a fib: PTA on metoprolol, coumadin. EKG a fib, rate 112  - Continue PTA metoprolol with hold parameters  - Coumadin on hold in case ERCP needed     DMII: A1c 7.7 3/2018. PTA on Metformin, glipizide. Glucose elevated to 216 in the ED. Not checking blood glucose PTA  - Hold PTA meds while NPO  - MSSI, hypoglycemia protocol     HTN, CAD: PTA on metoprolol, lisinopril. Stress test 2009 inferior wall ischemia. Cath with moderate CAD with stenosis of 40-50% in LAD and RCA. No stents. Echo 1/2018 (done while in Afib with rates of 100-110); EF of 55-60%.    - Continue PTA meds with hold parameters      Other Chronic Medical  Issues:  BPH: Continue PTA flomax  Peripheral neuropathy: Continue PTA gabapentin  H/o c diff s/p fecal transplant: Tx 2012. 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 S/p forearm free flap reconstruction. No radiation  QTc prolongation: QTc 455 4/16. Avoid prolonging meds      Pain assessment:  Current Pain Score 4/4/2018   Patient currently in pain? denies   Pain score (0-10) -   Pain location -   Pain descriptors -   - Antoine is experiencing pain due to acute pancreatitis. Pain management was discussed and the plan was created in a collaborative fashion.  Antoine's response to the current recommendations: engaged  - Please see the plan for pain management as documented above      Diet: NPO for Medical/Clinical Reasons Except for: Meds  Fluids:  cc/hr  DVT Prophylaxis: Warfarin  Code Status: Prior    Disposition Plan   Expected discharge: 2 - 3 days; recommended to prior living arrangement once acute pancteatitis treated, GI consult completed/interventions indicated complete.     Entered: Deb Pichardo 04/16/2018, 12:06 PM   Information in the above section will display in the discharge planner report.    The patient's care was discussed with the patient attending physician, Dr. Corine Pichardo  Internal Medicine Staff Hospitalist Service  [facility]  Pager: [phone]  Please see sticky note for cross cover information  ______________________________________________________________________    Chief Complaint   Abdominal pain    History is obtained from the patient and medical record     History of Present Illness   Antoine Russo is a 72 year old male with history of recurrent pancreatitis and IPMN (s/p distal pancreatectomy c/b pancreatic leak in 2016) s/p ERCP with biliary sphincterotomy and stent placement (CBD and PD to decompress  leak) 3/29/2017, A fib with RVR, HTN, DMII, peripheral neuropathy, and BPH who was admitted on 4/16/2018 with acute pancreatitis. Of note, patient recently admitted 3/30 to 4/4 with acute pancreatitis with sepsis requiring ICU stay.     Patient was recently discharged from [facility] 4/4. He had intermittent intervals without pain. He was in [place] over the past week and spent most of the time inside on the couch due to pain. Reports increased pain last night with radiation to the back. No N/V. Denies fever or chills. No racing heart rate or palpitations. Sober since 1/2018. Pain currently well controlled. Denies recent illness. No urinary symptoms. Denies change in bowels     Review of Systems   The 10 point Review of Systems is negative other than noted in the HPI or here.     Past Medical History    I have reviewed this patient's medical history and updated it with pertinent information if needed.   Past Medical History:   Diagnosis Date     C. 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  Past Surgical History:   Procedure Laterality Date     BREAST SURGERY  2008    right breast mass benign     COLONOSCOPY      multiple polyps removed     COLONOSCOPY  8/24/2011    Procedure:COMBINED COLONOSCOPY, REMOVE TUMOR/POLYP/LESION BY SNARE; Surgeon:MILEY ARBOLEDA; Location:[facility]     COLONOSCOPY  12/17/2012    Procedure: COLONOSCOPY;;  Surgeon: Leon Maurer MD;  Location:  GI     COLONOSCOPY  12/18/2012    Procedure: COLONOSCOPY;;  Surgeon: Leon Maurer MD;  Location:  GI     DENERVATION OF SPERMATIC CORD MICROSURGICAL Left 5/23/2017    Procedure: DENERVATION OF SPERMATIC CORD MICROSURGICAL;;   Surgeon: Marcio Aggarwal MD;  [facility]     DISSECTION RADICAL NECK BILATERAL  8/2/2012    Procedure: DISSECTION RADICAL NECK BILATERAL;;  Surgeon: Yung Alvares MD;  [facility]: [facility]     ENDOSCOPIC RETROGRADE CHOLANGIOPANCREATOGRAM N/A 5/10/2016    Procedure: COMBINED ENDOSCOPIC RETROGRADE CHOLANGIOPANCREATOGRAPHY, PLACE TUBE/STENT;  Surgeon: Yovanny Beasley MD;  [facility]     ENDOSCOPIC RETROGRADE CHOLANGIOPANCREATOGRAM N/A 3/29/2018    Procedure: ENDOSCOPIC RETROGRADE CHOLANGIOPANCREATOGRAM;  Endoscopic Retrograde Cholangiopancreatogram, Endoscopic Ultrasound, Biliary Sphincterotomy, Biliary and Pancreatic Stent Placement;  Surgeon: Yovanny Beasley MD;  [facility]     ENDOSCOPIC ULTRASOUND UPPER GASTROINTESTINAL TRACT (GI) N/A 2/3/2016    Procedure: ENDOSCOPIC ULTRASOUND, ESOPHAGOSCOPY / UPPER GASTROINTESTINAL TRACT (GI);  Surgeon: Grabiel Plata MD;  [facility]     ENDOSCOPIC ULTRASOUND UPPER GASTROINTESTINAL TRACT (GI) N/A 3/29/2018    Procedure: ENDOSCOPIC ULTRASOUND, ESOPHAGOSCOPY / UPPER GASTROINTESTINAL TRACT (GI);;  Surgeon: Grabiel Plata MD;  [facility]     ESOPHAGOSCOPY, GASTROSCOPY, DUODENOSCOPY (EGD), COMBINED N/A 2/3/2016    Procedure: COMBINED ENDOSCOPIC ULTRASOUND, ESOPHAGOSCOPY, GASTROSCOPY, DUODENOSCOPY (EGD), FINE NEEDLE ASPIRATE/BIOPSY;  Surgeon: Grabiel Plata MD;  [facility]     ESOPHAGOSCOPY, GASTROSCOPY, DUODENOSCOPY (EGD), COMBINED N/A 6/8/2016    Procedure: COMBINED ESOPHAGOSCOPY, GASTROSCOPY, DUODENOSCOPY (EGD), REMOVE FOREIGN BODY;  Surgeon: Yovanny Beasley MD;  [facility]     EXCISE LESION INTRAORAL  6/14/2012    Procedure: EXCISE LESION INTRAORAL;  Wide Local Excision Floor of Mouth, Direct Laryngoscopy, Bilateral Dutchess's Marsuplization, Split Thickness Skin Graft from right Thigh  Latex Safe;  Surgeon: Greson Ravi MD;  [facility]     EXCISE LESION INTRAORAL  8/2/2012    Procedure: EXCISE  LESION INTRAORAL;  Floor of Mouth Resection, Bilateral Selective Radical Neck Dissection, Tracheostomy, Left Radial Forearm  Free Flap with Alloderm, Nasogastric Feeding Tube Placement,    * Latex Safe*;  Surgeon: Gerson Ravi MD;  [facility]     EXCISE LESION INTRAORAL  12/11/2012    Procedure: EXCISE LESION INTRAORAL;  takedown of oral flap;  Surgeon: Yung Alvares MD;  [facility]     GRAFT FREE VASCULARIZED (LOCATION)  8/2/2012    Procedure: GRAFT FREE VASCULARIZED (LOCATION);;  Surgeon: Yung Alvares MD;  [facility]     GRAFT SKIN SPLIT THICKNESS FROM EXTREMITY  6/14/2012    Procedure: GRAFT SKIN SPLIT THICKNESS FROM EXTREMITY;;  Surgeon: Gerson Ravi MD;  [facility]     LAPAROSCOPIC ILEOSTOMY TAKEDOWN  6/6/2013    Procedure: LAPAROSCOPIC ILEOSTOMY TAKEDOWN;  Laparoscopic Closure of Enterostomy, Guerda's Type with IleoRectal Anastomosis ;  Surgeon: Grabiel Riddle MD;  [facility]     LAPAROTOMY EXPLORATORY  12/20/2012    Procedure: LAPAROTOMY EXPLORATORY;  Exploratory Laparotomy, total abdominal colectomy, ileostomy formation;  Surgeon: Miquel Cannon MD;  [facility]     LARYNGOSCOPY  6/14/2012    Procedure: LARYNGOSCOPY;;  Surgeon: Gerson Ravi MD;  [facility]     ORTHOPEDIC SURGERY      ganglian cyst left ankle     PANCREATECTOMY, SPLENECTOMY N/A 3/10/2016    Procedure: PANCREATECTOMY, SPLENECTOMY;  Surgeon: Nael Abel MD;  [facility]     SHOULDER SURGERY  2006, 2008    2006- right rotator cuff, 2008 bone spur on left. Dr. Hdez     VARICOCELECTOMY Left 5/23/2017    Procedure: VARICOCELECTOMY;  Left Varicocele Repair, Denervation of Left Testis;  Surgeon: Marcio Aggarwal MD;  [facility]        Social History   Social History   Substance Use Topics     Smoking status: Former Smoker     Packs/day: 1.00     Years: 40.00     Types: Cigarettes     Quit date: 11/24/2006     Smokeless tobacco: Never Used     Alcohol use No       Family  History   I have reviewed this patient's family history and updated it with pertinent information if needed.   Family History   Problem Relation Age of Onset     DIABETES Sister      onset age 50     Alzheimer Disease Mother       80     Alzheimer Disease Father       85     DIABETES Other      nephew type 1     DIABETES Other      Anesthesia Reaction No family hx of      Colon Cancer No family hx of      Colon Polyps No family hx of      Crohn Disease No family hx of      Ulcerative Colitis No family hx of        Prior to Admission Medications   Prior to Admission Medications   Prescriptions Last Dose Informant Patient Reported? Taking?   ASPIRIN NOT PRESCRIBED (INTENTIONAL)   Yes No   Sig: Antiplatelet medication not prescribed intentionally due to Current anticoagulant therapy (warfarin/enoxaparin)   gabapentin (NEURONTIN) 300 MG capsule   No No   Sig: Take 2 tablest (600 mg) every night   glipiZIDE (GLIPIZIDE XL) 2.5 MG 24 hr tablet  Self No No   Sig: Take 1 tablet (2.5 mg) by mouth every morning   lisinopril (PRINIVIL/ZESTRIL) 10 MG tablet  Self No No   Sig: Take 1 tablet (10 mg) by mouth daily   metFORMIN (GLUCOPHAGE-XR) 500 MG 24 hr tablet  Self No No   Sig: Take 2 tablets (1,000 mg) by mouth 2 times daily (with meals)   metoprolol succinate (TOPROL-XL) 50 MG 24 hr tablet   No No   Sig: Take 1 tablet (50 mg) by mouth 2 times daily   multivitamin, therapeutic with minerals (THERA-VIT-M) TABS  Self No No   Sig: Take 1 tablet by mouth daily.   Patient taking differently: Take 1 tablet by mouth every morning    tamsulosin (FLOMAX) 0.4 MG capsule  Self No No   Sig: Take 1 capsule (0.4 mg) by mouth daily   warfarin (COUMADIN) 2.5 MG tablet   Yes No   Si.25 mg Tues/Sat and 2.5 mg all other days or As directed by Anticoagulation Clinic      Facility-Administered Medications: None     Allergies   Allergies   Allergen Reactions     Nkda [No Known Drug Allergies]        Physical Exam   Vital Signs: Temp:  98.5  F (36.9  C) Temp src: Oral BP: 123/72 Pulse: 120 Heart Rate: 105 Resp: 12 SpO2: 95 % O2 Device: None (Room air)    Weight: 0 lbs 0 oz    General Appearance: Alert and oriented x3, sitting up in bed. Appears comfortable   Eyes: PERRLA. Anicteric sclera   HEENT: Membranes slightly dry   Respiratory: CTAB without wheezing or rales  Cardiovascular: A fib, regular rate. No murmur note  GI: Abdomen soft. Tender in the epigastrium and slightly left of the midline. No guarding or rebound   Lymph/Hematologic: No peripheral edema, distal pulses intact  Skin: No rash or jaundice   Musculoskeletal: Moves all extremities   Neurologic: No focal deficits. CN II-XII grossly intact  Psychiatric: Mood appears stable     Data   Data reviewed today: I reviewed all medications, new labs and imaging results over the last 24 hours. I personally reviewed admission EKG, prior notes and labs    Data     Recent Labs  Lab 04/16/18  1040   WBC 18.5*   HGB 13.3   MCV 99   *   INR 2.59*      POTASSIUM 5.2   CHLORIDE 102   CO2 23   BUN 16   CR 1.08   ANIONGAP 10   NILSON 10.1   *   ALBUMIN 2.7*   PROTTOTAL 7.4   BILITOTAL 0.5   ALKPHOS 84   ALT 16   AST 17   LIPASE 1375*     No results found for this or any previous visit (from the past 24 hour(s)).

## 2018-04-16 NOTE — PATIENT INSTRUCTIONS
Wt Readings from Last 5 Encounters:   04/16/18 181 lb 9.6 oz (82.4 kg)   04/02/18 203 lb 9.6 oz (92.4 kg)   03/30/18 200 lb (90.7 kg)   03/29/18 200 lb 13.4 oz (91.1 kg)   03/16/18 198 lb (89.8 kg)      Back to hospital

## 2018-04-16 NOTE — ED PROVIDER NOTES
History     Chief Complaint   Patient presents with     Abdominal Pain     HPI  Antoine Russo is a 72 year old male with a history of pancreatitis, atrial fibrillation on warfarin, HTN, and DM II who presents to the Emergency Department for evaluation of abdominal pain. Patient reports that last week he began having symptoms of upper left abdominal pain that radiates to his back and shortness of breath. These symptoms have been constant since onset and worsened last night. He has had a decrease in food intake in the last week due to the worsening pain when he eats.  Patient states that these symptoms are consistent with past pancreatitis flares. He denies nausea, vomiting, or fevers. Patient notes that he recently had a stent placed on 3/29.     No current facility-administered medications for this encounter.      Current Outpatient Prescriptions   Medication     ASPIRIN NOT PRESCRIBED (INTENTIONAL)     gabapentin (NEURONTIN) 300 MG capsule     glipiZIDE (GLIPIZIDE XL) 2.5 MG 24 hr tablet     HYDROmorphone (DILAUDID) 2 MG tablet     lisinopril (PRINIVIL/ZESTRIL) 10 MG tablet     metFORMIN (GLUCOPHAGE-XR) 500 MG 24 hr tablet     metoprolol succinate (TOPROL-XL) 50 MG 24 hr tablet     multivitamin, therapeutic with minerals (THERA-VIT-M) TABS     tamsulosin (FLOMAX) 0.4 MG capsule     WARFARIN SODIUM PO     WARFARIN SODIUM PO     Past Medical History:   Diagnosis Date     C. 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     Noninfectious ileitis      Recurrent pancreatitis (H)        Past Surgical History:   Procedure Laterality Date     BREAST SURGERY  2008    right breast mass benign     COLONOSCOPY      multiple polyps removed     COLONOSCOPY  8/24/2011    Procedure:COMBINED COLONOSCOPY, REMOVE TUMOR/POLYP/LESION BY SNARE; Surgeon:MILEY ARBOLEDA;  Location:[facility]     COLONOSCOPY  12/17/2012    Procedure: COLONOSCOPY;;  Surgeon: Leon Maurer MD;  Location: [facility]     COLONOSCOPY  12/18/2012    Procedure: COLONOSCOPY;;  Surgeon: Leon Maurer MD;  Location: [facility]     DENERVATION OF SPERMATIC CORD MICROSURGICAL Left 5/23/2017    Procedure: DENERVATION OF SPERMATIC CORD MICROSURGICAL;;  Surgeon: Marcio Aggarwal MD;  [facility]     DISSECTION RADICAL NECK BILATERAL  8/2/2012    Procedure: DISSECTION RADICAL NECK BILATERAL;;  Surgeon: Yung Alvares MD;  [facility]     ENDOSCOPIC RETROGRADE CHOLANGIOPANCREATOGRAM N/A 5/10/2016    Procedure: COMBINED ENDOSCOPIC RETROGRADE CHOLANGIOPANCREATOGRAPHY, PLACE TUBE/STENT;  Surgeon: Yovanny Beasley MD;  [facility]     ENDOSCOPIC RETROGRADE CHOLANGIOPANCREATOGRAM N/A 3/29/2018    Procedure: ENDOSCOPIC RETROGRADE CHOLANGIOPANCREATOGRAM;  Endoscopic Retrograde Cholangiopancreatogram, Endoscopic Ultrasound, Biliary Sphincterotomy, Biliary and Pancreatic Stent Placement;  Surgeon: Yovanny Beasley MD;  [facility]     ENDOSCOPIC ULTRASOUND UPPER GASTROINTESTINAL TRACT (GI) N/A 2/3/2016    Procedure: ENDOSCOPIC ULTRASOUND, ESOPHAGOSCOPY / UPPER GASTROINTESTINAL TRACT (GI);  Surgeon: Grabiel Plata MD;  [facility]     ENDOSCOPIC ULTRASOUND UPPER GASTROINTESTINAL TRACT (GI) N/A 3/29/2018    Procedure: ENDOSCOPIC ULTRASOUND, ESOPHAGOSCOPY / UPPER GASTROINTESTINAL TRACT (GI);;  Surgeon: Grabiel Plata MD;  [facility]     ESOPHAGOSCOPY, GASTROSCOPY, DUODENOSCOPY (EGD), COMBINED N/A 2/3/2016    Procedure: COMBINED ENDOSCOPIC ULTRASOUND, ESOPHAGOSCOPY, GASTROSCOPY, DUODENOSCOPY (EGD), FINE NEEDLE ASPIRATE/BIOPSY;  Surgeon: Grabiel Plata MD;  [facility]     ESOPHAGOSCOPY, GASTROSCOPY, DUODENOSCOPY (EGD), COMBINED N/A 6/8/2016    Procedure: COMBINED ESOPHAGOSCOPY, GASTROSCOPY, DUODENOSCOPY (EGD), REMOVE FOREIGN BODY;  Surgeon: Yovanny Beasley,  MD;  [facility]     ESOPHAGOSCOPY, GASTROSCOPY, DUODENOSCOPY (EGD), COMBINED N/A 4/17/2018    Procedure: COMBINED ESOPHAGOSCOPY, GASTROSCOPY, DUODENOSCOPY (EGD), REMOVE FOREIGN BODY;  EGD with stent removal;  Surgeon: Grabiel Plata MD;  [facility]     EXCISE LESION INTRAORAL  6/14/2012    Procedure: EXCISE LESION INTRAORAL;  Wide Local Excision Floor of Mouth, Direct Laryngoscopy, Bilateral Warsaw's Marsuplization, Split Thickness Skin Graft from right Thigh  Latex Safe;  Surgeon: Gerson Ravi MD;  [facility]     EXCISE LESION INTRAORAL  8/2/2012    Procedure: EXCISE LESION INTRAORAL;  Floor of Mouth Resection, Bilateral Selective Radical Neck Dissection, Tracheostomy, Left Radial Forearm  Free Flap with Alloderm, Nasogastric Feeding Tube Placement,    * Latex Safe*;  Surgeon: Gerson Ravi MD;  [facility]     EXCISE LESION INTRAORAL  12/11/2012    Procedure: EXCISE LESION INTRAORAL;  takedown of oral flap;  Surgeon: Yung Alvares MD;  [facility]     GRAFT FREE VASCULARIZED (LOCATION)  8/2/2012    Procedure: GRAFT FREE VASCULARIZED (LOCATION);;  Surgeon: Yung Alvares MD;  [facility]     GRAFT SKIN SPLIT THICKNESS FROM EXTREMITY  6/14/2012    Procedure: GRAFT SKIN SPLIT THICKNESS FROM EXTREMITY;;  Surgeon: Gerson Ravi MD;  [facility]     LAPAROSCOPIC ILEOSTOMY TAKEDOWN  6/6/2013    Procedure: LAPAROSCOPIC ILEOSTOMY TAKEDOWN;  Laparoscopic Closure of Enterostomy, Guerda's Type with IleoRectal Anastomosis ;  Surgeon: Grabiel Riddle MD;  [facility]     LAPAROTOMY EXPLORATORY  12/20/2012    Procedure: LAPAROTOMY EXPLORATORY;  Exploratory Laparotomy, total abdominal colectomy, ileostomy formation;  Surgeon: Miquel Cannon MD;  [facility]     LARYNGOSCOPY  6/14/2012    Procedure: LARYNGOSCOPY;;  Surgeon: Gerson Ravi MD;  [facility]     ORTHOPEDIC SURGERY      ganglian cyst left ankle     PANCREATECTOMY, SPLENECTOMY N/A 3/10/2016     "Procedure: PANCREATECTOMY, SPLENECTOMY;  Surgeon: Nael Abel MD;  [facility]     SHOULDER SURGERY  , 2008    2006- right rotator cuff, 2008 bone spur on left. Dr. Hdez     VARICOCELECTOMY Left 2017    Procedure: VARICOCELECTOMY;  Left Varicocele Repair, Denervation of Left Testis;  Surgeon: Marcio Aggarwal MD;  [facility]       Family History   Problem Relation Age of Onset     DIABETES Sister      onset age 50     Alzheimer Disease Mother       80     Alzheimer Disease Father       85     DIABETES Other      nephew type 1     DIABETES Other      Anesthesia Reaction No family hx of      Colon Cancer No family hx of      Colon Polyps No family hx of      Crohn Disease No family hx of      Ulcerative Colitis No family hx of        Social History   Substance Use Topics     Smoking status: Former Smoker     Packs/day: 1.00     Years: 40.00     Types: Cigarettes     Quit date: 2006     Smokeless tobacco: Never Used     Alcohol use No     Allergies   Allergen Reactions     Nkda [No Known Drug Allergies]      I have reviewed the Medications, Allergies, Past Medical and Surgical History, and Social History in the Epic system.    Review of Systems   Constitutional: Positive for appetite change. Negative for fever.   Respiratory: Positive for shortness of breath.    Gastrointestinal: Positive for abdominal pain (upper left radiating to back). Negative for nausea and vomiting.   All other systems reviewed and are negative.      Physical Exam   BP: (!) 89/72  Pulse: 120  Heart Rate: 130  Temp: 98.5  F (36.9  C)  Resp: 16  Height: 177.8 cm (5' 10\")  Weight: 82.7 kg (182 lb 6.4 oz)  SpO2: 99 %      Physical Exam   Constitutional: He is oriented to person, place, and time. Vital signs are normal. He appears well-developed and well-nourished.  Non-toxic appearance. He does not appear ill. No distress.   HENT:   Head: Normocephalic and atraumatic.   Mouth/Throat: Oropharynx is clear and moist. " No oropharyngeal exudate.   Eyes: Conjunctivae and EOM are normal. Pupils are equal, round, and reactive to light. No scleral icterus.   Neck: Normal range of motion. Neck supple. No JVD present. No tracheal deviation present. No thyromegaly present.   Cardiovascular: Regular rhythm, normal heart sounds and intact distal pulses.  Tachycardia present.  Exam reveals no gallop and no friction rub.    No murmur heard.  Pulmonary/Chest: Effort normal and breath sounds normal. No respiratory distress.   Abdominal: Soft. Bowel sounds are normal. He exhibits no distension and no mass. There is tenderness (mod-severe without peritoneal signs) in the right upper quadrant, epigastric area and left upper quadrant. There is no rigidity, no rebound and no guarding.   Musculoskeletal: Normal range of motion. He exhibits no edema or tenderness.   Lymphadenopathy:     He has no cervical adenopathy.   Neurological: He is alert and oriented to person, place, and time. He has normal strength. No cranial nerve deficit or sensory deficit.   Skin: Skin is warm and dry. No rash noted. No erythema. No pallor.   Psychiatric: He has a normal mood and affect. His behavior is normal.   Nursing note and vitals reviewed.      ED Course   10:39 AM  The patient was seen and examined by Garo Grover MD in Room 21.   ED Course     Procedures        Labs Ordered and Resulted from Time of ED Arrival Up to the Time of Departure from the ED   CBC WITH PLATELETS DIFFERENTIAL - Abnormal; Notable for the following:        Result Value    WBC 18.5 (*)     RBC Count 4.18 (*)     RDW 15.2 (*)     Platelet Count 624 (*)     Absolute Neutrophil 14.9 (*)     All other components within normal limits   INR - Abnormal; Notable for the following:     INR 2.59 (*)     All other components within normal limits   PARTIAL THROMBOPLASTIN TIME - Abnormal; Notable for the following:     PTT 41 (*)     All other components within normal limits   COMPREHENSIVE METABOLIC  PANEL - Abnormal; Notable for the following:     Glucose 216 (*)     Albumin 2.7 (*)     All other components within normal limits   LIPASE - Abnormal; Notable for the following:     Lipase 1375 (*)     All other components within normal limits   ISTAT  GASES LACTATE REAGAN POCT - Abnormal; Notable for the following:     PO2 Venous 48 (*)     All other components within normal limits   ISTAT CG4 GASES LACTATE REAGAN NURSING POCT     CT Abdomen w/o & w & Pelvis w Contrast   Final Result   IMPRESSION:    1. Pancreatitis with new evidence of pancreatic necrosis involving the   pancreatic head/uncinate process, with 5.2 cm acute necrotic   collection along the uncinate process. Additionally there are necrotic   peripancreatic lymph nodes.   2. Pancreatic duct stent is present. No evidence of pancreatic ductal   dilatation.   3. Hypoattenuating area along the distal pancreatic resection margin   is slightly decreased in size compared to 3/30/2018.      I have personally reviewed the examination and initial interpretation   and I agree with the findings.      MARC FIERRO MD               EKG Interpretation:      Interpreted by Garo Grover    Symptoms at time of EKG: tachycardia   Rhythm: Atrial fibrillation - rapid  Rate: 112  Ectopy: None  Conduction: Normal  ST Segments/ T Waves: No ST-T wave changes and No acute ischemic changes  Q Waves: None  Comparison to prior: Unchanged    Clinical Impression: atrial fibrillation (chronic)         Assessments & Plan (with Medical Decision Making)     This patient presented to the emergency department with complaints of abdominal pain.  His past history of pancreatitis.  He was tachycardic and blood pressure was somewhat low at presentation, tachycardia is found to be the patient's chronic atrial fibrillation with a mildly rapid response and patient reports that his blood pressures are usually in the 90s to low 100s.  No evidence to suggest severe sepsis or septic shock.   No evidence of acute infectious process.  Patient does have elevated lipase and CT scan which suggests acute pancreatitis.  He was treated with IV fluids, pain medication, and I discussed the case with the on-call hospitalist.  The patient will be admitted to their service and was transferred upstairs in stable condition.    I have reviewed the nursing notes.    I have reviewed the findings, diagnosis, plan and need for follow up with the patient.        Final diagnoses:   Acute recurrent pancreatitis   I, Judith Montoya, am serving as a trained medical scribe to document services personally performed by Fidel Grover MD, based on the provider's statements to me.   I, Fidel Grover MD, was physically present and have reviewed and verified the accuracy of this note documented by Judith Montoya.    4/16/2018   [facility], [place], EMERGENCY DEPARTMENT     Garo Grover MD  04/23/18 0854

## 2018-04-16 NOTE — PLAN OF CARE
Problem: Patient Care Overview  Goal: Plan of Care/Patient Progress Review   04/16/18 1449   OTHER   Plan Of Care Reviewed With patient   Plan of Care Review   Progress no change     New admission ~ 1300 from ED for pancreatitis/ abdominal pain. Admission completed. AVSS, placed on tele monitoring. Dilaudid given x1 for pain. LR infusing at 200ml/hr into R PIV. NPO.  Education on unit schedule and patient monitoring. Continue to monitor, following plan of care.          Problem: Pain, Acute (Adult)  Goal: Identify Related Risk Factors and Signs and Symptoms  Related risk factors and signs and symptoms are identified upon initiation of Human Response Clinical Practice Guideline (CPG).    04/16/18 1449   Pain, Acute   Related Risk Factors (Acute Pain) disease process;persistent pain   Signs and Symptoms (Acute Pain) verbalization of pain descriptors

## 2018-04-17 ENCOUNTER — TELEPHONE (OUTPATIENT)
Dept: GASTROENTEROLOGY | Facility: CLINIC | Age: 73
End: 2018-04-17

## 2018-04-17 PROBLEM — K85.91 NECROTIZING PANCREATITIS: Status: ACTIVE | Noted: 2018-04-17

## 2018-04-17 LAB
ALBUMIN SERPL-MCNC: 2 G/DL (ref 3.4–5)
ALP SERPL-CCNC: 66 U/L (ref 40–150)
ALT SERPL W P-5'-P-CCNC: 12 U/L (ref 0–70)
ANION GAP SERPL CALCULATED.3IONS-SCNC: 8 MMOL/L (ref 3–14)
AST SERPL W P-5'-P-CCNC: 10 U/L (ref 0–45)
BASOPHILS # BLD AUTO: 0 10E9/L (ref 0–0.2)
BASOPHILS NFR BLD AUTO: 0.1 %
BILIRUB DIRECT SERPL-MCNC: 0.2 MG/DL (ref 0–0.2)
BILIRUB SERPL-MCNC: 0.5 MG/DL (ref 0.2–1.3)
BUN SERPL-MCNC: 9 MG/DL (ref 7–30)
CALCIUM SERPL-MCNC: 8.8 MG/DL (ref 8.5–10.1)
CHLORIDE SERPL-SCNC: 104 MMOL/L (ref 94–109)
CO2 SERPL-SCNC: 27 MMOL/L (ref 20–32)
CREAT SERPL-MCNC: 0.9 MG/DL (ref 0.66–1.25)
DIFFERENTIAL METHOD BLD: ABNORMAL
EOSINOPHIL # BLD AUTO: 0.1 10E9/L (ref 0–0.7)
EOSINOPHIL NFR BLD AUTO: 0.9 %
ERYTHROCYTE [DISTWIDTH] IN BLOOD BY AUTOMATED COUNT: 15.5 % (ref 10–15)
GFR SERPL CREATININE-BSD FRML MDRD: 84 ML/MIN/1.7M2
GLUCOSE BLDC GLUCOMTR-MCNC: 140 MG/DL (ref 70–99)
GLUCOSE BLDC GLUCOMTR-MCNC: 150 MG/DL (ref 70–99)
GLUCOSE BLDC GLUCOMTR-MCNC: 151 MG/DL (ref 70–99)
GLUCOSE BLDC GLUCOMTR-MCNC: 160 MG/DL (ref 70–99)
GLUCOSE SERPL-MCNC: 150 MG/DL (ref 70–99)
HCT VFR BLD AUTO: 36.8 % (ref 40–53)
HGB BLD-MCNC: 11.3 G/DL (ref 13.3–17.7)
IMM GRANULOCYTES # BLD: 0.1 10E9/L (ref 0–0.4)
IMM GRANULOCYTES NFR BLD: 0.6 %
INR PPP: 3.23 (ref 0.86–1.14)
LYMPHOCYTES # BLD AUTO: 2.5 10E9/L (ref 0.8–5.3)
LYMPHOCYTES NFR BLD AUTO: 16.5 %
MCH RBC QN AUTO: 31 PG (ref 26.5–33)
MCHC RBC AUTO-ENTMCNC: 30.7 G/DL (ref 31.5–36.5)
MCV RBC AUTO: 101 FL (ref 78–100)
MONOCYTES # BLD AUTO: 1.1 10E9/L (ref 0–1.3)
MONOCYTES NFR BLD AUTO: 6.9 %
NEUTROPHILS # BLD AUTO: 11.6 10E9/L (ref 1.6–8.3)
NEUTROPHILS NFR BLD AUTO: 75 %
NRBC # BLD AUTO: 0 10*3/UL
NRBC BLD AUTO-RTO: 0 /100
PLATELET # BLD AUTO: 470 10E9/L (ref 150–450)
POTASSIUM SERPL-SCNC: 4.6 MMOL/L (ref 3.4–5.3)
PROT SERPL-MCNC: 5.8 G/DL (ref 6.8–8.8)
RBC # BLD AUTO: 3.65 10E12/L (ref 4.4–5.9)
SODIUM SERPL-SCNC: 138 MMOL/L (ref 133–144)
WBC # BLD AUTO: 15.4 10E9/L (ref 4–11)

## 2018-04-17 PROCEDURE — 25000131 ZZH RX MED GY IP 250 OP 636 PS 637: Performed by: PHYSICIAN ASSISTANT

## 2018-04-17 PROCEDURE — 80076 HEPATIC FUNCTION PANEL: CPT | Performed by: PHYSICIAN ASSISTANT

## 2018-04-17 PROCEDURE — 20000002 ZZH R&B BMT INTERMEDIATE

## 2018-04-17 PROCEDURE — 99232 SBSQ HOSP IP/OBS MODERATE 35: CPT | Mod: GC | Performed by: INTERNAL MEDICINE

## 2018-04-17 PROCEDURE — 00000146 ZZHCL STATISTIC GLUCOSE BY METER IP

## 2018-04-17 PROCEDURE — 25000132 ZZH RX MED GY IP 250 OP 250 PS 637: Performed by: INTERNAL MEDICINE

## 2018-04-17 PROCEDURE — 80048 BASIC METABOLIC PNL TOTAL CA: CPT | Performed by: PHYSICIAN ASSISTANT

## 2018-04-17 PROCEDURE — 85610 PROTHROMBIN TIME: CPT | Performed by: PHYSICIAN ASSISTANT

## 2018-04-17 PROCEDURE — 85025 COMPLETE CBC W/AUTO DIFF WBC: CPT | Performed by: PHYSICIAN ASSISTANT

## 2018-04-17 PROCEDURE — 99207 ZZC APP CREDIT; MD BILLING SHARED VISIT: CPT | Performed by: PHYSICIAN ASSISTANT

## 2018-04-17 PROCEDURE — 25000125 ZZHC RX 250: Performed by: INTERNAL MEDICINE

## 2018-04-17 PROCEDURE — 36415 COLL VENOUS BLD VENIPUNCTURE: CPT | Performed by: PHYSICIAN ASSISTANT

## 2018-04-17 PROCEDURE — 25000132 ZZH RX MED GY IP 250 OP 250 PS 637: Performed by: PHYSICIAN ASSISTANT

## 2018-04-17 PROCEDURE — 25000128 H RX IP 250 OP 636: Performed by: INTERNAL MEDICINE

## 2018-04-17 PROCEDURE — 25000128 H RX IP 250 OP 636: Performed by: PHYSICIAN ASSISTANT

## 2018-04-17 PROCEDURE — G0500 MOD SEDAT ENDO SERVICE >5YRS: HCPCS | Performed by: INTERNAL MEDICINE

## 2018-04-17 PROCEDURE — 0DC98ZZ EXTIRPATION OF MATTER FROM DUODENUM, VIA NATURAL OR ARTIFICIAL OPENING ENDOSCOPIC: ICD-10-PCS | Performed by: INTERNAL MEDICINE

## 2018-04-17 PROCEDURE — 43247 EGD REMOVE FOREIGN BODY: CPT | Performed by: INTERNAL MEDICINE

## 2018-04-17 RX ORDER — SIMETHICONE
LIQUID (ML) MISCELLANEOUS PRN
Status: DISCONTINUED | OUTPATIENT
Start: 2018-04-17 | End: 2018-04-17 | Stop reason: HOSPADM

## 2018-04-17 RX ORDER — POLYETHYLENE GLYCOL 3350 17 G/17G
17 POWDER, FOR SOLUTION ORAL DAILY PRN
Status: DISCONTINUED | OUTPATIENT
Start: 2018-04-17 | End: 2018-04-18 | Stop reason: HOSPADM

## 2018-04-17 RX ORDER — FENTANYL CITRATE 50 UG/ML
INJECTION, SOLUTION INTRAMUSCULAR; INTRAVENOUS PRN
Status: DISCONTINUED | OUTPATIENT
Start: 2018-04-17 | End: 2018-04-17 | Stop reason: HOSPADM

## 2018-04-17 RX ORDER — NALOXONE HYDROCHLORIDE 0.4 MG/ML
.1-.4 INJECTION, SOLUTION INTRAMUSCULAR; INTRAVENOUS; SUBCUTANEOUS
Status: DISCONTINUED | OUTPATIENT
Start: 2018-04-17 | End: 2018-04-17

## 2018-04-17 RX ORDER — ACETAMINOPHEN 325 MG/1
650 TABLET ORAL EVERY 4 HOURS PRN
Status: DISCONTINUED | OUTPATIENT
Start: 2018-04-17 | End: 2018-04-18 | Stop reason: HOSPADM

## 2018-04-17 RX ORDER — HYDROMORPHONE HYDROCHLORIDE 2 MG/1
2 TABLET ORAL
Status: DISCONTINUED | OUTPATIENT
Start: 2018-04-17 | End: 2018-04-18 | Stop reason: HOSPADM

## 2018-04-17 RX ADMIN — MULTIPLE VITAMINS W/ MINERALS TAB 1 TABLET: TAB at 10:25

## 2018-04-17 RX ADMIN — Medication 1.25 MG: at 18:49

## 2018-04-17 RX ADMIN — GABAPENTIN 600 MG: 600 TABLET, FILM COATED ORAL at 21:25

## 2018-04-17 RX ADMIN — TAMSULOSIN HYDROCHLORIDE 0.4 MG: 0.4 CAPSULE ORAL at 10:25

## 2018-04-17 RX ADMIN — SODIUM CHLORIDE, POTASSIUM CHLORIDE, SODIUM LACTATE AND CALCIUM CHLORIDE: 600; 310; 30; 20 INJECTION, SOLUTION INTRAVENOUS at 22:14

## 2018-04-17 RX ADMIN — HYDROMORPHONE HYDROCHLORIDE 2 MG: 2 TABLET ORAL at 14:55

## 2018-04-17 RX ADMIN — INSULIN ASPART 1 UNITS: 100 INJECTION, SOLUTION INTRAVENOUS; SUBCUTANEOUS at 11:59

## 2018-04-17 RX ADMIN — METOPROLOL SUCCINATE 50 MG: 50 TABLET, EXTENDED RELEASE ORAL at 21:25

## 2018-04-17 RX ADMIN — INSULIN ASPART 1 UNITS: 100 INJECTION, SOLUTION INTRAVENOUS; SUBCUTANEOUS at 16:44

## 2018-04-17 RX ADMIN — HYDROMORPHONE HYDROCHLORIDE 2 MG: 2 TABLET ORAL at 18:49

## 2018-04-17 RX ADMIN — SODIUM CHLORIDE, POTASSIUM CHLORIDE, SODIUM LACTATE AND CALCIUM CHLORIDE: 600; 310; 30; 20 INJECTION, SOLUTION INTRAVENOUS at 15:46

## 2018-04-17 RX ADMIN — HYDROMORPHONE HYDROCHLORIDE 0.5 MG: 1 INJECTION, SOLUTION INTRAMUSCULAR; INTRAVENOUS; SUBCUTANEOUS at 04:41

## 2018-04-17 NOTE — PHARMACY-ANTICOAGULATION SERVICE
Clinical Pharmacy - Warfarin Dosing Consult     Pharmacy has been consulted to manage this patient s warfarin therapy.       INR   Date Value Ref Range Status   04/17/2018 3.23 (H) 0.86 - 1.14 Final   04/16/2018 2.59 (H) 0.86 - 1.14 Final       Recommend warfarin 1.25 mg today.  Pharmacy will monitor Antoine LOPEZ Rafaela daily and order warfarin doses to achieve specified goal 2-3. Home regimen was 1.25 mg  Tue, Sat and 2.5 mg all other days. No medication interactions with warfarin .     Please contact pharmacy as soon as possible if the warfarin needs to be held for a procedure or if the warfarin goals change.

## 2018-04-17 NOTE — TELEPHONE ENCOUNTER
Pt currently inpt. ZEB - I removed his PD stent today.    NILTON Plata MD  Associate Professor of Medicine  Division of Gastroenterology, Hepatology and Nutrition  [facility]

## 2018-04-17 NOTE — PLAN OF CARE
Problem: Patient Care Overview  Goal: Plan of Care/Patient Progress Review  Outcome: No Change   04/16/18 1817   OTHER   Plan Of Care Reviewed With patient   Plan of Care Review   Progress no change     Poli has requested dilaudid twice tonight which has helped with his pain. He has been NPO as ordered, getting iv fluids.

## 2018-04-17 NOTE — OR NURSING
EGD done w/ stent removal.  Stent intact upon removal.  Patient tolerated well, with conscious sedation, VSS, on RA.  Report given to floor RN.

## 2018-04-17 NOTE — PROGRESS NOTES
"CLINICAL NUTRITION SERVICES - ASSESSMENT NOTE     Nutrition Prescription    RECOMMENDATIONS FOR MDs/PROVIDERS TO ORDER:  Continue to advance diet towards Low Fat diet as tolerated.     Malnutrition Status:    Non-severe malnutrition in the context of chronic illness     Recommendations already ordered by Registered Dietitian (RD):  Supplements    Future/Additional Recommendations:  Continue to encourage PO intake of >75% of meals/supplements and snacks. Recommend pt consume at least 2/3 of estimated needs (~1400 kcal, 65g PRO) daily to avoid more aggressive nutrition interventions.          REASON FOR ASSESSMENT  Antoine Russo is a/an 72 year old male assessed by the dietitian for Admission Nutrition Risk Screen for unintentional loss of 10# or more in the past two months and Provider Order - Nutrition Romain < 3 and Nutrition Education Consult - assist in educating on low fat diet    NUTRITION HISTORY  Poli reports he has been educated on low fat diet in the past. He was checking his BG at home, but states he has not been checking it anymore. Reports since his most recent discharge (4/4/18) from the hospital he has been eating ~2 meals per day, but eating < than baseline d/t unsure on what food is okay to eat, feeling more fatigued/tired, and having intermittent abdominal pain. Pt reports he is not eating green veggies and not longer taking boost/ensure d/t it being too high in vitamin K+. Reports having loose stools without evidence of oil/undigested food (note: hx of colectomy).     CURRENT NUTRITION ORDERS  Diet: Full Liquid, Calorie Counts  Intake/Tolerance: Pt reports an appetite and is excited to eat when able. Had consumed majority of CL diet tray this afternoon.    LABS  Labs reviewed    MEDICATIONS  Medications reviewed    ANTHROPOMETRICS  Height: 174.5 cm (5' 8.701\")  Most Recent Weight: 83.2 kg (183 lb 6.4 oz)    IBW: 71.9 kg  BMI: Overweight BMI 25-29.9  Weight History: Down 17 lbs (8.6%) x 1 month. " "Pt confirmed.  Wt Readings from Last 15 Encounters:   04/17/18 83.2 kg (183 lb 6.4 oz)   04/16/18 82.4 kg (181 lb 9.6 oz)   04/02/18 92.4 kg (203 lb 9.6 oz)   03/30/18 90.7 kg (200 lb)   03/29/18 91.1 kg (200 lb 13.4 oz)   03/16/18 89.8 kg (198 lb)   03/07/18 90.6 kg (199 lb 11.2 oz)   02/28/18 87.3 kg (192 lb 7.4 oz)   01/30/18 92.1 kg (203 lb)   01/30/18 90.3 kg (199 lb)   01/22/18 90.7 kg (200 lb)   01/09/18 92.4 kg (203 lb 9.6 oz)   01/01/18 90.7 kg (200 lb)   10/10/17 92.1 kg (203 lb)   08/02/17 90.3 kg (199 lb)       Dosing Weight: 82 kg (acutal) - direst wt of 82.4 kg (4/16/18)    ASSESSED NUTRITION NEEDS  Estimated Energy Needs: 0798-6546 kcals/day (25 - 30 kcals/kg)  Justification: Maintenance  Estimated Protein Needs:  grams protein/day (1.2 - 1.5 grams of pro/kg)  Justification: Hypercatabolism with acute illness  Estimated Fluid Needs: 7500-6818 mL/day (1 mL/kcal)   Justification: Maintenance    PHYSICAL FINDINGS  See malnutrition section below.    MALNUTRITION  % Intake: < 75% for >/= 1 month (non-severe)  % Weight Loss: > 5% in 1 month (severe)  Subcutaneous Fat Loss: None observed  Muscle Loss: Upper arm (bicep, tricep) and Lower arm  (forearm):  Mild (pt notes feeling weaker, but does not note significant changes in muscle mass)  Fluid Accumulation/Edema: None noted  Malnutrition Diagnosis: Non-severe malnutrition in the context of chronic illness     NUTRITION DIAGNOSIS  Inadequate oral intake r/t decreased appetite, fatigue with chronic illness, and abdominal pain AEB suspect eating <75% of estimated needs for >1 month with resulting wt loss of 8.6% of body wt     INTERVENTIONS  Implementation  Nutrition Education: Provided education on following a low fat diet. Discussed high and low fat foods and how to avoid and limit use of fat. Provided pt with a list of low fat foods and handout \"Low Fat Diet\". Reviewed them both with pt. Answered all of pt/wife question about diet. Wife wondering if pt " "can have an energy drink \"boost/ensure\".     Medical food supplement therapy: ordered supplements with meals prn (preference: strawberry or chocolate boost)    Goals  Patient to consume % of nutritionally adequate meal trays TID, or the equivalent with supplements/snacks.     Monitoring/Evaluation  Progress toward goals will be monitored and evaluated per protocol.    Baldo Pruitt RD, GERALDINE  5C/BMT Dietitian  Pager: [phone]  "

## 2018-04-17 NOTE — PROGRESS NOTES
[facility], [place]    Internal Medicine Progress Note - Gold Service      Assessment & Plan   Antoine Russo is a 72 year old male with history of recurrent pancreatitis and IPMN (s/p distal pancreatectomy c/b pancreatic leak in 2016) s/p ERCP with biliary sphincterotomy and stent placement (CBD and PD to decompress leak) 3/29/2017, A fib with RVR, HTN, DMII, peripheral neuropathy, and BPH who was admitted on 4/16/2018 with acute pancreatitis. Of note, patient recently admitted 3/30 to 4/4 with acute pancreatitis with sepsis requiring ICU stay.      Necrotizing pancreatitis: H/o IPMN (s/p distal pancreatectomy c/b pancreatic leak in 2016). Repeat ERCPs over the past 4-5 months due to recurrent pancreatitis. Admitted 4/16 with abdominal pain c/w prior episodes. Sober since 1/2018. Lipase 1375. CTAP 4/16 new evidence of pancreatic necrosis of pancreatic head/uncinate process, necrotic lymph nodes. PD stent present. No CBD stent. S/p EGD 4/17 with PD stent removal. WBC 15.4 (18.5). VSS  - GI following  - Continue LR at 150 cc/hr for now  - CLD  - Nutrition consult, calorie counts   - Transition to oral dilaudid     Chronic a fib: PTA on metoprolol, coumadin. EKG a fib, rate 112. INR 3.23  - Continue PTA metoprolol with hold parameters  - Resume Coumadin     DMII: A1c 7.7 3/2018. PTA on Metformin, glipizide. Glucose stable  - Hold PTA meds   - MSSI, hypoglycemia protocol      HTN, CAD: PTA on metoprolol, lisinopril. Stress test 2009 inferior wall ischemia. Cath with moderate CAD with stenosis of 40-50% in LAD and RCA. No stents. Echo 1/2018 (done while in Afib with rates of 100-110); EF of 55-60%.    - Continue PTA meds with hold parameters        Other Chronic Medical Issues:  BPH: Continue PTA flomax  Peripheral neuropathy: Continue PTA gabapentin  H/o c diff s/p fecal transplant: Tx 2012. S/p colectomy and ileoanal anastomosis  H/O malignant neoplasm of mouth: SCC s/p transcervical  glossectomy and floor of mouth excision with bilateral neck dissections 8/2012. S/p forearm free flap reconstruction. No radiation  QTc prolongation: QTc 455 4/16. Avoid prolonging meds        Pain assessment:  Current Pain Score 4/17/2018   Patient currently in pain? denies   Pain score (0-10) -   Pain location -   Pain descriptors -   - Antoine is experiencing pain due to necrotizing pancreatitis. Pain management was discussed with Antoine and his significant other and the plan was created in a collaborative fashion.  Antoine's response to the current recommendations: compliant  - Please see the plan for pain management as documented above      Diet: Calorie Counts  Clear Liquid Diet  Fluids:  cc/hr  DVT Prophylaxis: Warfarin  Code Status: Prior    Disposition Plan   Expected discharge: 2 - 3 days, recommended to prior living arrangement once workup and treatment plan for nec pancreatitis determined, tolerating diet.     Entered: Deb Pichardo 04/17/2018, 11:34 AM   Information in the above section will display in the discharge planner report.      The patient's care was discussed with the patient, nursing, care coordination, GI, and attending physician, Dr. Kathy Pichardo  Internal Medicine Staff Hospitalist Service  [facility]  Pager: [phone]  Please see sticky note for cross cover information    Interval History   Feeling much better today. Would like diet. No chest pain or dyspnea. Denies confusion. No fever or chills. Tolerated EGD well. Wife bedside    Data reviewed today: I reviewed all medications, new labs and imaging results over the last 24 hours. I personally reviewed EGD report, CT     Physical Exam   Vital Signs: Temp: 98.5  F (36.9  C) Temp src: Oral BP: 102/61   Heart Rate: 86 Resp: 16 SpO2: 94 % O2 Device: None (Room air)    Weight: 183 lbs 6.4 oz  General Appearance: Alert and oriented x3, sitting up in bed. Appears comfortable. Wife bedside   Eyes:  PERRLA. Anicteric sclera   Respiratory: CTAB without wheezing or rales  Cardiovascular: A fib, regular rate. No murmur note  GI: Abdomen soft. Tender in the epigastrium and slightly left of the midline. No guarding or rebound   Lymph/Hematologic: No peripheral edema, distal pulses intact  Skin: No rash or jaundice   Musculoskeletal: Moves all extremities   Neurologic: No focal deficits. CN II-XII grossly intact  Psychiatric: Mood appears stable         Data   Data     Recent Labs  Lab 04/17/18  0426 04/16/18  1040   WBC 15.4* 18.5*   HGB 11.3* 13.3   * 99   * 624*   INR 3.23* 2.59*    135   POTASSIUM 4.6 5.2   CHLORIDE 104 102   CO2 27 23   BUN 9 16   CR 0.90 1.08   ANIONGAP 8 10   NILSON 8.8 10.1   * 216*   ALBUMIN 2.0* 2.7*   PROTTOTAL 5.8* 7.4   BILITOTAL 0.5 0.5   ALKPHOS 66 84   ALT 12 16   AST 10 17   LIPASE  --  1375*     Recent Results (from the past 24 hour(s))   CT Abdomen w/o & w & Pelvis w Contrast    Narrative    EXAMINATION: CT ABDOMEN W/O & W & PELVIS W CONTRAST,  4/16/2018 12:22 PM    TECHNIQUE:  Helical CT images from the lung bases through the  symphysis pubis were obtained with IV contrast. Contrast dose: 111 cc  of isovue 370    COMPARISON: CT 3/30/2018    HISTORY: Pancreatitis, please do pancreas protocol; History of distal  pancreatectomy for abdomen.    FINDINGS:    Abdomen and pelvis: New areas of hypoenhancement involving the  pancreatic head/uncinate process, consistent with pancreatic necrosis,  for example the posterior aspect of the pancreatic head (series 9  image 166). There is a more well-defined acute necrotic collection  along the medial aspect of the uncinate process (series 9 image 199),  which measures approximately 4.0 x 2.3 x 5.2 cm. There is fat  stranding and layering fluid surrounding the pancreas, which is  actually decreased from 3/30/2018.     Hypoattenuating area in the pancreatic tail along the resection margin  (series 7 image 131), is decrease  in size, measuring approximately 1.2  x 1.8 cm, previously 1.7 x 1.8 cm.     Pancreatic duct is not dilated. No cleared discontinuity of the  pancreatic duct. Pancreatic duct stent is present. Biliary stent has  been removed. No intrahepatic or extrahepatic biliary dilatation.    There is mass effect on the portal vein confluence without evidence of  portal venous thrombosis. The splenic artery and vein are surgically  absent. No evidence of pseudoaneurysm.    Multiple enlarged peripancreatic lymph nodes are increased from  3/30/2018, including 1.3 cm short axis diameter node (series 7 image  140), which appears centrally necrotic and previously measured 0.9 cm  in diameter.    Diffuse gastric wall thickening, similar to 3/30/2018. The liver,  gallbladder and adrenals are within normal limits. Spleen is absent.  Multiple hyperdense renal cysts which do not demonstrate enhancement,  consistent with benign proteinaceous/hemorrhagic cyst. Additional  simple bilateral renal cysts. Bladder and prostate are within normal  limits. Postoperative changes of total colectomy. The small bowel is  within normal limits. Diffuse atherosclerotic changes of the  infrarenal aorta and iliac arteries, without aortic aneurysm. No free  air.    Lung bases: Clear.    Bones and soft tissues: Old posterior left 10th and 11th rib  fractures. No acute fracture or suspicious bone lesion. Calcified disc  extrusion at L3-L4 with moderate spinal canal narrowing, not  significantly changed.      Impression    IMPRESSION:   1. Pancreatitis with new evidence of pancreatic necrosis involving the  pancreatic head/uncinate process, with 5.2 cm acute necrotic  collection along the uncinate process. Additionally there are necrotic  peripancreatic lymph nodes.  2. Pancreatic duct stent is present. No evidence of pancreatic ductal  dilatation.  3. Hypoattenuating area along the distal pancreatic resection margin  is slightly decreased in size compared to  3/30/2018.    I have personally reviewed the examination and initial interpretation  and I agree with the findings.    MARC FIERRO MD

## 2018-04-17 NOTE — PLAN OF CARE
Problem: Patient Care Overview  Goal: Plan of Care/Patient Progress Review   04/17/18 1410   OTHER   Plan Of Care Reviewed With patient;spouse   Plan of Care Review   Progress improving     0700 - 1500: AVSS. No c/o pain throughout shift, did not give any pain medication. Patient went down this AM for stent removal. Returned, advanced to a clear liquid diet. Ate lunch - tolerating well. Up independently in room. Fluids LR decreased to 150ml/hr infusing into R PIV. Last  - 1 unit insulin given. Continue to monitor, following plan of care.     Problem: Pain, Acute (Adult)  Goal: Identify Related Risk Factors and Signs and Symptoms  Related risk factors and signs and symptoms are identified upon initiation of Human Response Clinical Practice Guideline (CPG).    04/17/18 1410   Pain, Acute   Related Risk Factors (Acute Pain) disease process   Signs and Symptoms (Acute Pain) other (see comments)  (none)       Patient has some abdominal pain - given PO dilaudid x1 at change of shift. Advanced diet to full liquid - patient currently ordering meal.

## 2018-04-17 NOTE — PROGRESS NOTES
GASTROENTEROLOGY PROGRESS NOTE    ASSESSMENT:  71 yo M with h/o a fib on coumadin, s/p distal pancreatectomy for IPMN of pancreatic tail in the setting of chronic pancreatitis, complicated by PD leak s/p post pancreatic stent 5/2016, initially did well but developed recurrent acute pancreatitis by elevated enzymes and CT for the last 4-5 months, now presenting with recurrent acute necrotizing pancreatitis with acute necrotizing pancreatic fluid collections. Patient was recently admitted 3/29/18-4/5/18 recurrent necrotizing pancreatitis had ERCP with pancreatic stent placed on 3/29/18 developed post ERCP pancreatitis with lipase 84313.     Procedures:   3/29/18: EUS to look for recurrent IPMN. An anechoic irregular collection was identified in pancreatic body just upstream of distal pancreatectomy resection margin, very small and along the staple line, c/w a recurrent tail leak with surrounding inflammation.        3/29/18: ERCP: one 5 Fr x 3 cm Sofflex plastic stent placed into the pancreatic duct to drain small collections in tail via duct decompression. A 10 mm biliary sphincterotomy performed to open the entire sphincter, no pancreatic sphincterotomy was performed at the time.         Acute necrotizing pancreatitis with acute peripancreatic necrotic fluid collection: initial presentation 3/29/18, likely due to post ERCP. Lipase up to 65197 on 3/31/18, down to 1375 4/16/18. Able to tolerate some PO diet. Afebrile, and leukocytosis trending down from 34 on 4/1/18 to 15. Residual leukocytosis likely from ongoing inflammation from necrotizing pancreatitis. CT showed new evidence of involving necrotizing pancreatitis, appeared to be acute fluid collection and no walled off necrosis noted. BISAPs score 2 (for SIRS and Age). S/p upper endoscopy with PD stent pulled today, pain improved significantly afterwards.       RECOMMENDATIONS:  - Nutrition on board, start calorie count. If not meeting calorie count, patient  "likely need NJ tube feeding (patient currently declining TF)    - Advance as tolerates to low fat diet .   - Decrease LR fluid as patient is able to tolerate PO  - Trend LFts, CBC with diff and BMP    GI will continue to follow, please page if any questions.     The patient was discussed and plan agreed upon with GI staff, Dr. Sherlyn Carnes  GI Fellow  Pager   ______________________________________________________________  S: patient had upper endoscopy with PD stent pulled today. Abdominal pain improved significantly after stent was pulled. Tolerating diet.     O:  Blood pressure 114/68, pulse 120, temperature 99  F (37.2  C), temperature source Axillary, resp. rate 16, height 5' 8.7\" (1.745 m), weight 183 lb 6.4 oz (83.2 kg), SpO2 95 %.    Gen: no acute distress  HEENT: atraumatic, no sclera icterus   CV: RRR, no murmur   Lungs: CLA b/l, no wheezing or crackles   Abd: soft, non tender, non distended, normal active BS  Skin: no jaundice   MS: no skeletal pain   Neuro: no asterixis, A&Ox3, no focal neurological deficit  Psych: normal mood      LABS:  BMP    Recent Labs  Lab 04/17/18 0426 04/16/18  1040    135   POTASSIUM 4.6 5.2   CHLORIDE 104 102   NILSON 8.8 10.1   CO2 27 23   BUN 9 16   CR 0.90 1.08   * 216*     CBC    Recent Labs  Lab 04/17/18 0426 04/16/18  1040   WBC 15.4* 18.5*   RBC 3.65* 4.18*   HGB 11.3* 13.3   HCT 36.8* 41.5   * 99   MCH 31.0 31.8   MCHC 30.7* 32.0   RDW 15.5* 15.2*   * 624*     INR    Recent Labs  Lab 04/17/18  0426 04/16/18  1040   INR 3.23* 2.59*     LFTs    Recent Labs  Lab 04/17/18 0426 04/16/18  1040   ALKPHOS 66 84   AST 10 17   ALT 12 16   BILITOTAL 0.5 0.5   PROTTOTAL 5.8* 7.4   ALBUMIN 2.0* 2.7*      PANC    Recent Labs  Lab 04/16/18  1040   LIPASE 1375*             "

## 2018-04-18 ENCOUNTER — ANTICOAGULATION THERAPY VISIT (OUTPATIENT)
Dept: ANTICOAGULATION | Facility: CLINIC | Age: 73
End: 2018-04-18
Payer: COMMERCIAL

## 2018-04-18 VITALS
HEIGHT: 69 IN | DIASTOLIC BLOOD PRESSURE: 83 MMHG | OXYGEN SATURATION: 94 % | BODY MASS INDEX: 27.16 KG/M2 | RESPIRATION RATE: 18 BRPM | HEART RATE: 120 BPM | WEIGHT: 183.4 LBS | TEMPERATURE: 98.8 F | SYSTOLIC BLOOD PRESSURE: 115 MMHG

## 2018-04-18 DIAGNOSIS — I48.20 CHRONIC ATRIAL FIBRILLATION (H): ICD-10-CM

## 2018-04-18 DIAGNOSIS — Z79.01 LONG-TERM (CURRENT) USE OF ANTICOAGULANTS: ICD-10-CM

## 2018-04-18 LAB
ANION GAP SERPL CALCULATED.3IONS-SCNC: 9 MMOL/L (ref 3–14)
BUN SERPL-MCNC: 6 MG/DL (ref 7–30)
CALCIUM SERPL-MCNC: 8.8 MG/DL (ref 8.5–10.1)
CHLORIDE SERPL-SCNC: 100 MMOL/L (ref 94–109)
CO2 SERPL-SCNC: 28 MMOL/L (ref 20–32)
CREAT SERPL-MCNC: 0.89 MG/DL (ref 0.66–1.25)
ERYTHROCYTE [DISTWIDTH] IN BLOOD BY AUTOMATED COUNT: 15.4 % (ref 10–15)
GFR SERPL CREATININE-BSD FRML MDRD: 84 ML/MIN/1.7M2
GLUCOSE BLDC GLUCOMTR-MCNC: 196 MG/DL (ref 70–99)
GLUCOSE SERPL-MCNC: 220 MG/DL (ref 70–99)
HCT VFR BLD AUTO: 38.3 % (ref 40–53)
HGB BLD-MCNC: 12.1 G/DL (ref 13.3–17.7)
INR PPP: 3.43 (ref 0.86–1.14)
MCH RBC QN AUTO: 31.8 PG (ref 26.5–33)
MCHC RBC AUTO-ENTMCNC: 31.6 G/DL (ref 31.5–36.5)
MCV RBC AUTO: 101 FL (ref 78–100)
PLATELET # BLD AUTO: 532 10E9/L (ref 150–450)
POTASSIUM SERPL-SCNC: 4.3 MMOL/L (ref 3.4–5.3)
RBC # BLD AUTO: 3.81 10E12/L (ref 4.4–5.9)
SODIUM SERPL-SCNC: 137 MMOL/L (ref 133–144)
WBC # BLD AUTO: 17 10E9/L (ref 4–11)

## 2018-04-18 PROCEDURE — 99239 HOSP IP/OBS DSCHRG MGMT >30: CPT | Performed by: INTERNAL MEDICINE

## 2018-04-18 PROCEDURE — 36415 COLL VENOUS BLD VENIPUNCTURE: CPT | Performed by: PHYSICIAN ASSISTANT

## 2018-04-18 PROCEDURE — 85027 COMPLETE CBC AUTOMATED: CPT | Performed by: PHYSICIAN ASSISTANT

## 2018-04-18 PROCEDURE — 25000132 ZZH RX MED GY IP 250 OP 250 PS 637: Performed by: PHYSICIAN ASSISTANT

## 2018-04-18 PROCEDURE — 99207 ZZC NO CHARGE NURSE ONLY: CPT | Performed by: REGISTERED NURSE

## 2018-04-18 PROCEDURE — 00000146 ZZHCL STATISTIC GLUCOSE BY METER IP

## 2018-04-18 PROCEDURE — 85610 PROTHROMBIN TIME: CPT | Performed by: PHYSICIAN ASSISTANT

## 2018-04-18 PROCEDURE — 80048 BASIC METABOLIC PNL TOTAL CA: CPT | Performed by: PHYSICIAN ASSISTANT

## 2018-04-18 PROCEDURE — 25000128 H RX IP 250 OP 636: Performed by: PHYSICIAN ASSISTANT

## 2018-04-18 RX ORDER — HYDROMORPHONE HYDROCHLORIDE 2 MG/1
2 TABLET ORAL
Qty: 10 TABLET | Refills: 0 | Status: ON HOLD | OUTPATIENT
Start: 2018-04-18 | End: 2018-04-21

## 2018-04-18 RX ORDER — POLYETHYLENE GLYCOL 3350 17 G/17G
17 POWDER, FOR SOLUTION ORAL DAILY PRN
Qty: 7 PACKET | Refills: 0 | Status: ON HOLD | OUTPATIENT
Start: 2018-04-18 | End: 2018-04-20

## 2018-04-18 RX ADMIN — INSULIN ASPART 2 UNITS: 100 INJECTION, SOLUTION INTRAVENOUS; SUBCUTANEOUS at 08:04

## 2018-04-18 RX ADMIN — HYDROMORPHONE HYDROCHLORIDE 2 MG: 2 TABLET ORAL at 08:08

## 2018-04-18 RX ADMIN — TAMSULOSIN HYDROCHLORIDE 0.4 MG: 0.4 CAPSULE ORAL at 07:58

## 2018-04-18 RX ADMIN — HYDROMORPHONE HYDROCHLORIDE 2 MG: 2 TABLET ORAL at 00:59

## 2018-04-18 RX ADMIN — LISINOPRIL 10 MG: 10 TABLET ORAL at 07:57

## 2018-04-18 RX ADMIN — SODIUM CHLORIDE, POTASSIUM CHLORIDE, SODIUM LACTATE AND CALCIUM CHLORIDE: 600; 310; 30; 20 INJECTION, SOLUTION INTRAVENOUS at 03:44

## 2018-04-18 RX ADMIN — METOPROLOL SUCCINATE 50 MG: 50 TABLET, EXTENDED RELEASE ORAL at 07:58

## 2018-04-18 RX ADMIN — MULTIPLE VITAMINS W/ MINERALS TAB 1 TABLET: TAB at 07:57

## 2018-04-18 ASSESSMENT — PAIN DESCRIPTION - DESCRIPTORS
DESCRIPTORS: SHARP
DESCRIPTORS: SHARP

## 2018-04-18 NOTE — PLAN OF CARE
Problem: Pain, Acute (Adult)  Goal: Identify Related Risk Factors and Signs and Symptoms  Related risk factors and signs and symptoms are identified upon initiation of Human Response Clinical Practice Guideline (CPG).   Outcome: No Change   04/18/18 0635   Pain, Acute   Related Risk Factors (Acute Pain) disease process   Signs and Symptoms (Acute Pain) constipation/diarrhea   Patient alert and oriented x4. Temp max 99.6 AX. Patient on telemetry monitoring; NSR with rate in 90's. BP ok. Sats occasionally borderline low on room air, upon awakening. Increase when fully awake.  Up ad guerrero in room. Prefers to use bathroom as opposed to using urinal at bedside. He reports usually passing loose stool every time he voids due to bowel resection in past. Maintenance IV fluid  (Lactated Ringers) infusing at 150 ml/hour per peripheral IV. Also drank carton of chocolate flavored Boost. Sleep disrupted due to awakening to void more often with IV hydration overnight. Voiding yellow urine. He requested Dilaudid 2 mg po once for upper abdominal pain associated with stent removal yesterday. He said pain was relieved. Venipuncture blood draw beginning of night for CBC/P. No other labs ordered.

## 2018-04-18 NOTE — DISCHARGE SUMMARY
[facility], [place]    Internal Medicine Discharge Summary- Gold Service    Date of Admission:  4/16/2018  Date of Discharge:  4/18/2018  Discharging Provider: Deb Pichardo/Dr. Chavez  Discharge Team: Gold 3    Discharge Diagnoses   Necrotizing pancreatitis  Non-severe malnutrition  DMII  HTN  A Fib  CAD  BPH  QTc prolongation    Follow-ups Needed After Discharge   - Follow up with PCP within 1 week. Follow up with INR within 48 hours  - Follow up with GI as scheduled 5/7/2018  - Follow up outpatient with nutrition for ongoing care    Hospital Course   Antoine Russo is a 72 year old male with history of recurrent pancreatitis and IPMN (s/p distal pancreatectomy c/b pancreatic leak in 2016) s/p ERCP with biliary sphincterotomy and stent placement (CBD and PD to decompress leak) 3/29/2017, A fib with RVR, HTN, DMII, peripheral neuropathy, and BPH who was admitted on 4/16/2018 with acute pancreatitis. Of note, patient recently admitted 3/30 to 4/4 with acute pancreatitis with sepsis requiring ICU stay.       Necrotizing pancreatitis: H/o IPMN (s/p distal pancreatectomy c/b pancreatic leak in 2016). Repeat ERCPs over the past 4-5 months due to recurrent pancreatitis. Admitted 4/16 with abdominal pain c/w prior episodes. Sober since 1/2018. Lipase 1375. CTAP 4/16 new evidence of pancreatic necrosis of pancreatic head/uncinate process, necrotic lymph nodes. PD stent present. No CBD stent. S/p EGD 4/17 with PD stent removal. Patient tolerating low fat diet prior to discharge. WBC 17.0 (15.4). VSS  - GI consulted this admission, patient will follow up with GI 5/7/2018  - Follow up with nutrition for consult  - Low fat diet on discharge  - Patient will discharge with 10 tabs oral dilaudid for ongoing pain management     Non-severe nutrition: Patient tolerating low fat diet. Calorie counts started this admission, not completed as patient discharged. GI suggest NJ, however, patient  declined  - Follow up with nutrition for ongoing care      Chronic a fib: PTA on metoprolol, coumadin. INR 3.43  - Continue PTA metoprolol   - Per d/w pharmacy, patient should follow up with INR clinic in the next 48 hours for further assistance with resuming Coumadin.       DMII: A1c 7.7 3/2018. PTA on Metformin, glipizide. On SSI while inpatient  - Transition to PTA meds on discharge       HTN, CAD: PTA on metoprolol, lisinopril. Stress test 2009 inferior wall ischemia. Cath with moderate CAD with stenosis of 40-50% in LAD and RCA. No stents. Echo 1/2018 (done while in Afib with rates of 100-110); EF 55-60%.   - Continue PTA meds          Other Chronic Medical Issues:  BPH: Continue PTA flomax on discharge  Peripheral neuropathy: Continue PTA gabapentin on discharge   H/o c diff s/p fecal transplant: Tx 2012. 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 S/p forearm free flap reconstruction. No radiation  QTc prolongation: QTc 455 4/16. Avoid prolonging meds    Discharge pain plan: - During his hospitalization, Antoine experienced pain due to necrotizing pancreatitis. The pain plan for discharge was discussed with Antoine and the plan was created in a collaborative fashion.    - See above for management plan    Consultations This Hospital Stay   GI, nutrition    Code Status   Full Code    Time Spent on this Encounter   IDeb, personally saw the patient today and spent greater than 30 minutes discharging this patient.       Deb Pichardo  Internal Medicine Staff Hospitalist Service  [facility]  Pager: [phone]  ______________________________________________________________________    Physical Exam   Vital Signs: Temp: 98.8  F (37.1  C) Temp src: Axillary BP: 115/83   Heart Rate: 90 Resp: 18 SpO2: 94 % O2 Device: None (Room air)    Weight: 183  lbs 6.4 oz    General Appearance: Alert and oriented x3, sitting up in bed. Appears comfortable  Respiratory: CTAB without wheezing or rales  Cardiovascular: A fib, no murmur noted  GI: Abdomen soft, very mild epigastric tenderness. No guarding or rebound   Skin: No rash or jaundice  Other: Distal pulses palpable. No peripheral edema    Significant Results and Procedures   Most Recent 3 CBC's:  Recent Labs   Lab Test  04/18/18   0040  04/17/18   0426  04/16/18   1040   WBC  17.0*  15.4*  18.5*   HGB  12.1*  11.3*  13.3   MCV  101*  101*  99   PLT  532*  470*  624*     Most Recent 3 BMP's:  Recent Labs   Lab Test  04/18/18   0450  04/17/18   0426  04/16/18   1040   NA  137  138  135   POTASSIUM  4.3  4.6  5.2   CHLORIDE  100  104  102   CO2  28  27  23   BUN  6*  9  16   CR  0.89  0.90  1.08   ANIONGAP  9  8  10   NILSON  8.8  8.8  10.1   GLC  220*  150*  216*     Most Recent 2 LFT's:  Recent Labs   Lab Test  04/17/18   0426  04/16/18   1040   AST  10  17   ALT  12  16   ALKPHOS  66  84   BILITOTAL  0.5  0.5     Most Recent 3 INR's:  Recent Labs   Lab Test  04/17/18   0426  04/16/18   1040 04/05/18   INR  3.23*  2.59*  1.6*       Pending Results   Unresulted Labs Ordered in the Past 30 Days of this Admission     No orders found from 2/15/2018 to 4/17/2018.             Primary Care Physician   Katie Gross    Discharge Disposition   Discharged to home  Condition at discharge: Stable    Discharge Orders     Nutrition Referral     Reason for your hospital stay   You were admitted with recurrent necrotizing pancreatitis. You underwent EGD and stent was removed. You did well and were tolerating a low fat diet before discharge     Adult [facility] [email or web address] Follow-up and recommended labs and tests   Follow up with primary care provider, Katie Gross, within 7 days for hospital follow- up.  The following labs/tests are recommended: INR.    Follow up with Coumadin clinic in the next three days.    Follow up with nutrition consult  outpatient    Follow up with GI as already scheduled 5/7/2018      Appointments on [place] and/or [facility] (with [facility] or [facility] provider or service). Call [phone] if you haven't heard regarding these appointments within 7 days of discharge.     When to contact your care team   Call or return if you develop uncontrolled pain, N/V, fever >101, confusion, or other symptoms of concern to you     Discharge Instructions   1. Continue low fat diet on discharge  2. Follow up with GI, your PCP, and nutrition following discharge  3. No driving while on opiate pain medications     Activity   Your activity upon discharge: activity as tolerated, no driving while on opiates     Full Code     Diet   Follow this diet upon discharge: Orders Placed This Encounter     Calorie Counts     Snacks/Supplements Adult: With Meals     Low Fat Diet       Discharge Medications   Current Discharge Medication List      START taking these medications    Details   HYDROmorphone (DILAUDID) 2 MG tablet Take 1 tablet (2 mg) by mouth every 3 hours as needed for moderate to severe pain  Qty: 10 tablet, Refills: 0    Associated Diagnoses: Acute recurrent pancreatitis      polyethylene glycol (MIRALAX/GLYCOLAX) Packet Take 17 g by mouth daily as needed for constipation  Qty: 7 packet, Refills: 0    Associated Diagnoses: Constipation, unspecified constipation type         CONTINUE these medications which have NOT CHANGED    Details   ASPIRIN NOT PRESCRIBED (INTENTIONAL) Antiplatelet medication not prescribed intentionally due to Current anticoagulant therapy (warfarin/enoxaparin)      gabapentin (NEURONTIN) 300 MG capsule Take 2 tablest (600 mg) every night  Qty: 180 capsule, Refills: 1    Associated Diagnoses: Type 2 diabetes mellitus with diabetic polyneuropathy, without long-term current use of insulin (H)      glipiZIDE (GLIPIZIDE XL) 2.5 MG 24 hr tablet Take 1 tablet (2.5 mg) by mouth every morning  Qty: 90 tablet, Refills: 3     Associated Diagnoses: Type 2 diabetes mellitus with diabetic polyneuropathy, without long-term current use of insulin (H)      lisinopril (PRINIVIL/ZESTRIL) 10 MG tablet Take 1 tablet (10 mg) by mouth daily  Qty: 90 tablet, Refills: 1    Associated Diagnoses: Persistent proteinuria      metFORMIN (GLUCOPHAGE-XR) 500 MG 24 hr tablet Take 2 tablets (1,000 mg) by mouth 2 times daily (with meals)  Qty: 360 tablet, Refills: 1    Associated Diagnoses: Type 2 diabetes mellitus with diabetic polyneuropathy, without long-term current use of insulin (H)      metoprolol succinate (TOPROL-XL) 50 MG 24 hr tablet Take 1 tablet (50 mg) by mouth 2 times daily  Qty: 180 tablet, Refills: 3    Associated Diagnoses: Hypertension, benign essential, goal below 140/90      multivitamin, therapeutic with minerals (THERA-VIT-M) TABS Take 1 tablet by mouth daily.  Qty: 30 each, Refills: 1    Associated Diagnoses: Surgery aftercare      tamsulosin (FLOMAX) 0.4 MG capsule Take 1 capsule (0.4 mg) by mouth daily  Qty: 90 capsule, Refills: 3    Associated Diagnoses: Urgency of urination; Hypertrophy of prostate without urinary obstruction      warfarin (COUMADIN) 2.5 MG tablet 1.25 mg Tues/Sat and 2.5 mg all other days or As directed by Anticoagulation Clinic  Qty: 90 tablet, Refills: 0    Associated Diagnoses: Chronic atrial fibrillation (H)           Allergies   Allergies   Allergen Reactions     Nkda [No Known Drug Allergies]      Physician Attestation   I, Waleska Chavez, personally saw and evaluated Antoine LOPEZ Rafaela as part of a shared visit.  I have reviewed and discussed with the advanced practice provider their discharge plan.    My key history or physical exam findings from the day of discharge:   No fevers, eating well  On oral pain meds  No sig abd pain    Key management decisions made by me:   Ok to dc  Sent with 10 tabs of dilaudid  INR elevated-discussed with patient to hold next 2 doses and then go to Coumadin Clinic  obdulio Chavez  Date of Service (when I saw the patient): 4/18/18

## 2018-04-18 NOTE — PROGRESS NOTES
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 Patient was recently admitted 3/29/18-4/5/18 recurrent necrotizing pancreatitis had ERCP with pancreatic stent placed on 3/29/18 developed post ERCP pancreatitis with lipase [id number].     Procedures:   3/29/18: EUS to look for recurrent IPMN. An anechoic irregular collection was identified in pancreatic body just upstream of distal pancreatectomy resection margin, very small and along the staple line, c/w a recurrent tail leak with surrounding inflammation.        3/29/18: ERCP: one 5 Fr x 3 cm Sofflex plastic stent placed into the pancreatic duct to drain small collections in tail via duct decompression. A 10 mm biliary sphincterotomy performed to open the entire sphincter, no pancreatic sphincterotomy was performed at the time.       Acute necrotizing pancreatitis with acute peripancreatic necrotic fluid collection: initial presentation 3/29/18, likely due to post ERCP. Lipase up to 21935 on 3/31/18, down to 1375 4/16/18. Able to tolerate some PO diet. Afebrile, and leukocytosis trending down from 34 on 4/1/18 to 15. Residual leukocytosis likely from ongoing inflammation from necrotizing pancreatitis. CT showed new evidence of involving necrotizing pancreatitis, appeared to be acute fluid collection and no walled off necrosis noted. BISAPs score 2 (for SIRS and Age). S/p upper endoscopy with PD stent pulled 4/17, pain improved significantly afterwards.       RECOMMENDATIONS:  - Patient tolerating PO.   - Advance as tolerates to low fat diet .   - Trend LFts, CBC  "with diff and BMP  - Pt is discharged today. He is scheduled to have a follow up CT and seen by Dr. Beasley in Clinic on 5/7/18.     The patient was discussed and plan agreed upon with GI staff, Dr. Curly Carnes  GI Fellow  Pager   ______________________________________________________________  S: Patient is doing well after PD stent pulled. Tolerating diet.      O:  Blood pressure 115/83, pulse 120, temperature 98.8  F (37.1  C), temperature source Axillary, resp. rate 18, height 5' 8.7\" (1.745 m), weight 183 lb 6.4 oz (83.2 kg), SpO2 94 %.    Gen: no acute distress  HEENT: atraumatic, no sclera icterus   CV: RRR, no murmur   Lungs: CLA b/l, no wheezing or crackles   Abd: soft, non tender, non distended, normal active BS  Skin: no jaundice   MS: no skeletal pain   Neuro: no asterixis, A&Ox3, no focal neurological deficit  Psych: normal mood      LABS:  BMP    Recent Labs  Lab 04/18/18  0450 04/17/18  0426 04/16/18  1040    138 135   POTASSIUM 4.3 4.6 5.2   CHLORIDE 100 104 102   NILSON 8.8 8.8 10.1   CO2 28 27 23   BUN 6* 9 16   CR 0.89 0.90 1.08   * 150* 216*     CBC    Recent Labs  Lab 04/18/18  0040 04/17/18  0426 04/16/18  1040   WBC 17.0* 15.4* 18.5*   RBC 3.81* 3.65* 4.18*   HGB 12.1* 11.3* 13.3   HCT 38.3* 36.8* 41.5   * 101* 99   MCH 31.8 31.0 31.8   MCHC 31.6 30.7* 32.0   RDW 15.4* 15.5* 15.2*   * 470* 624*     INR    Recent Labs  Lab 04/18/18  1021 04/17/18  0426 04/16/18  1040   INR 3.43* 3.23* 2.59*     LFTs    Recent Labs  Lab 04/17/18  0426 04/16/18  1040   ALKPHOS 66 84   AST 10 17   ALT 12 16   BILITOTAL 0.5 0.5   PROTTOTAL 5.8* 7.4   ALBUMIN 2.0* 2.7*      PANC    Recent Labs  Lab 04/16/18  1040   LIPASE 1375*             "

## 2018-04-18 NOTE — PROGRESS NOTES
Calorie Counts  Intake recorded for: 4/17 Kcals: 238  Protein: 5g  # Meals Recorded: 100% sugar free hot cocoa, pudding, tomato soup  # Supplements Recorded: 0

## 2018-04-18 NOTE — PROGRESS NOTES
ANTICOAGULATION FOLLOW-UP CLINIC VISIT    Patient Name:  Antoine Russo  Date:  4/18/2018  Contact Type:  Telephone/ Dan    SUBJECTIVE:     Patient Findings     Comments Patient discharged from the  today for Pancreatitis flare and constipation, he had a stent removed and is on Dilatated for pain. He had an elevated INR today and will hold his warfarin today. Writer instructed him to take 1.25mg Thurs, Fri,Sat and 2.5mg Sunday. He will recheck his INR Monday in [place].           OBJECTIVE    INR   Date Value Ref Range Status   04/18/2018 3.43 (H) 0.86 - 1.14 Final       ASSESSMENT / PLAN  No question data found.  Anticoagulation Summary as of 4/18/2018     INR goal 2.0-3.0   Today's INR 3.43!   Maintenance plan 1.25 mg (2.5 mg x 0.5) on Tue, Sat; 2.5 mg (2.5 mg x 1) all other days   Full instructions 4/18: Hold; 4/19: 1.25 mg; 4/20: 1.25 mg; Otherwise 1.25 mg on Tue, Sat; 2.5 mg all other days   Weekly total 15 mg   Plan last modified Aparna Kunz RN (3/8/2018)   Next INR check 4/23/2018   Priority INR   Target end date Indefinite    Indications   Chronic atrial fibrillation (H) [I48.2]  Long-term (current) use of anticoagulants [Z79.01] [Z79.01]         Anticoagulation Episode Summary     INR check location     Preferred lab     Send INR reminders to [facility] CLINIC POOL    Comments *       Anticoagulation Care Providers     Provider Role Specialty Phone number    Troy Long, Katie Okeefe MD [facility] [phone]            See the Encounter Report to view Anticoagulation Flowsheet and Dosing Calendar (Go to Encounters tab in chart review, and find the Anticoagulation Therapy Visit)        Doris Gallardo RN

## 2018-04-18 NOTE — PLAN OF CARE
Problem: Patient Care Overview  Goal: Plan of Care/Patient Progress Review  Outcome: No Change   04/17/18 2150   OTHER   Plan Of Care Reviewed With patient;spouse   Plan of Care Review   Progress improving     Afebrile, OVSS on RA. Afib, rate controlled. Up ad guerrero in room. A&Ox4. Reports of dull pain in abdomen, given PO dilaudid x 1 w/ relief; states great improvement compared to pre-stent removal. Denies N/V. Tolerating full liquid diet well, robles counts continue.  and 140, SSI per MAR. NO BM this shift. Adequate UOP. LR infusing @ 150 via Rt PIV. Offers no new complaints. Will continue to monitor per POC.      Problem: Pain, Acute (Adult)  Goal: Identify Related Risk Factors and Signs and Symptoms  Related risk factors and signs and symptoms are identified upon initiation of Human Response Clinical Practice Guideline (CPG).   Outcome: No Change   04/17/18 2150   Pain, Acute   Related Risk Factors (Acute Pain) disease process   Signs and Symptoms (Acute Pain) verbalization of pain descriptors;fatigue/weakness     Goal: Acceptable Pain Control/Comfort Level  Patient will demonstrate the desired outcomes by discharge/transition of care.   Outcome: No Change   04/17/18 2150   Pain, Acute (Adult)   Acceptable Pain Control/Comfort Level making progress toward outcome

## 2018-04-18 NOTE — PROGRESS NOTES
Pt discharged to: Home  Via: Personal vehicle  Accompanied by: Wife  Belongings: Taken with patient  Teaching: Discharge teaching completed  Clinic appointment: Next 4/26. Patient to call clinic to set up additional appointment for coumadin dosing within 48hrs.   Report called/faxed: CAROLINE  Local housing:

## 2018-04-18 NOTE — MR AVS SNAPSHOT
Antoine JESSICA Russo   4/18/2018   Anticoagulation Therapy Visit    Description:  72 year old male   Provider:  Doris Gallardo, RN   [facility]           INR as of 4/18/2018     Today's INR 3.43!      Anticoagulation Summary as of 4/18/2018     INR goal 2.0-3.0   Today's INR 3.43!   Full instructions 4/18: Hold; 4/19: 1.25 mg; 4/20: 1.25 mg; Otherwise 1.25 mg on Tue, Sat; 2.5 mg all other days   Next INR check 4/23/2018    Indications   Chronic atrial fibrillation (H) [I48.2]  Long-term (current) use of anticoagulants [Z79.01] [Z79.01]         Your next Anticoagulation Clinic appointment(s)     Apr 23, 2018  8:30 AM CDT   Anticoagulation Visit with [place] ANTI COAG   [facility] ([facility])    [address] [place] [id number]   [phone]            Apr 26, 2018  9:45 AM CDT   Anticoagulation Visit with NB ANTI COAG   [facility] ([facility])    [address] [place] [id number]   [phone]              April 2018 Details    Sun Mon Tue Wed Thu Fri Sat     1               2               3               4               5               6               7                 8               9               10               11               12               13               14                 15               16               17               18      Hold   See details      19      1.25 mg         20      1.25 mg         21      1.25 mg           22      2.5 mg         23            24               25               26               27               28                 29               30                     Date Details   04/18 This INR check       Date of next INR:  4/23/2018         How to take your warfarin dose     To take:  1.25 mg Take 0.5 of a 2.5 mg tablet.    To take:  2.5 mg Take 1 of the 2.5 mg tablets.    Hold Do not take your warfarin dose. See the Details table to the right for additional  instructions.

## 2018-04-19 ENCOUNTER — TELEPHONE (OUTPATIENT)
Dept: FAMILY MEDICINE | Facility: CLINIC | Age: 73
End: 2018-04-19

## 2018-04-19 ENCOUNTER — PATIENT OUTREACH (OUTPATIENT)
Dept: CARE COORDINATION | Facility: CLINIC | Age: 73
End: 2018-04-19

## 2018-04-19 ENCOUNTER — OFFICE VISIT (OUTPATIENT)
Dept: FAMILY MEDICINE | Facility: CLINIC | Age: 73
End: 2018-04-19
Payer: COMMERCIAL

## 2018-04-19 ENCOUNTER — TELEPHONE (OUTPATIENT)
Dept: GASTROENTEROLOGY | Facility: CLINIC | Age: 73
End: 2018-04-19

## 2018-04-19 VITALS
WEIGHT: 185 LBS | DIASTOLIC BLOOD PRESSURE: 64 MMHG | RESPIRATION RATE: 20 BRPM | HEART RATE: 124 BPM | TEMPERATURE: 99.1 F | SYSTOLIC BLOOD PRESSURE: 108 MMHG | OXYGEN SATURATION: 96 % | BODY MASS INDEX: 27.56 KG/M2

## 2018-04-19 DIAGNOSIS — I48.20 CHRONIC ATRIAL FIBRILLATION (H): Chronic | ICD-10-CM

## 2018-04-19 DIAGNOSIS — E11.42 TYPE 2 DIABETES MELLITUS WITH DIABETIC POLYNEUROPATHY, WITHOUT LONG-TERM CURRENT USE OF INSULIN (H): Chronic | ICD-10-CM

## 2018-04-19 DIAGNOSIS — H93.8X2 SENSATION OF PLUGGED EAR, LEFT: ICD-10-CM

## 2018-04-19 DIAGNOSIS — I10 HYPERTENSION, BENIGN ESSENTIAL, GOAL BELOW 140/90: Chronic | ICD-10-CM

## 2018-04-19 DIAGNOSIS — E44.1 MILD MALNUTRITION (H): ICD-10-CM

## 2018-04-19 PROCEDURE — 99285 EMERGENCY DEPT VISIT HI MDM: CPT | Mod: 25 | Performed by: EMERGENCY MEDICINE

## 2018-04-19 PROCEDURE — 93010 ELECTROCARDIOGRAM REPORT: CPT | Mod: Z6 | Performed by: EMERGENCY MEDICINE

## 2018-04-19 PROCEDURE — 96361 HYDRATE IV INFUSION ADD-ON: CPT | Performed by: EMERGENCY MEDICINE

## 2018-04-19 PROCEDURE — 99495 TRANSJ CARE MGMT MOD F2F 14D: CPT | Performed by: FAMILY MEDICINE

## 2018-04-19 PROCEDURE — 93005 ELECTROCARDIOGRAM TRACING: CPT | Performed by: EMERGENCY MEDICINE

## 2018-04-19 PROCEDURE — 96374 THER/PROPH/DIAG INJ IV PUSH: CPT | Performed by: EMERGENCY MEDICINE

## 2018-04-19 NOTE — PATIENT INSTRUCTIONS
See the nutritionist  Drink one Boost daily, or two if desired, just keep the amount the same    INR Monday as planned    See you in a few weeks

## 2018-04-19 NOTE — TELEPHONE ENCOUNTER
ED/UC/IP follow up phone call:    RN please call to follow up.    Number of ED visits in past 12 months = 3  Janie Orn [facility] Sec

## 2018-04-19 NOTE — PROGRESS NOTES
Clinic Care Coordination Contact    OUTREACH    Referral Information:  Referral Source: IP Handoff         Chief Complaint   Patient presents with     Clinic Care Coordination - Post Hospital     Nurse: d/c from [facility] 4/18/18        Universal Utilization: Patient d/c from [facility] 4/18/18, he is coming in to see PCP today at 2:20 but will arrive at 2pm per Margarita.  Utilization    Last refreshed: 4/19/2018  3:21 AM:  No Show Count (past year) 0       Last refreshed: 4/19/2018  3:21 AM:  ED visits 1       Last refreshed: 4/19/2018  3:21 AM:  Hospital admissions 4          Current as of: 4/19/2018  3:21 AM             Clinical Concerns:  Current Medical Concerns:  RN CC spoke with patient, he states he is doing better. He is agreeable to coming in today to see PCP for hospital f/u. He denies having any questions or concerns regarding his discharge instructions. He verbalized he has to get hooked up with a nutritionist. He has his INR scheduled for Monday 4/23/18 in [place]. He is [facility]    Current Behavioral Concerns: No concerns at this time.    Education Provided to patient: n/a      Health Maintenance Reviewed:    Health Maintenance Due   Topic Date Due     EYE EXAM Q1 YEAR  05/15/2015     TETANUS IMMUNIZATION (SYSTEM ASSIGNED)  11/03/2016     INFLUENZA VACCINE (1) 09/01/2017     COLONOSCOPY Q5 YR  12/18/2017         Medication Management:  Patient is independent in medication management. Pt. verbalized adherence and understanding of medication regimen, side effects, purposes.      Functional Status:  Mobility Status: Independent  Equipment Currently Used at Home: walker, rolling, cane, straight (own SEC and FWW)  Transportation: drives        Psychosocial:  Current living arrangement:: I live in a private home with spouse  Type of residence:: Private home - stairs  Financial/Insurance: no concerns at this time.       Resources and Interventions:  Current Resources:  n/a  [facility]  Plans/Directives on file:: No        Goals:   n/a      Patient/Caregiver understanding: Patient verbalized understanding of his discharge instructions and is coming in today at 2pm to see PCP for hospital f/u.     Future Appointments              Today Katie Gross MD [facility]    In 4 days [place] ANTI COAG [facility]    In 1 week [place] ANTI COAG [facility]    In 2 weeks [facility], [facility]    In 2 weeks Yovanny Beasley MD [facility], [facility]           Plan:   Patient will continue to follow treatment plan as directed and follow up with PCP with concerns ongoing.   RN CC will f/u in 7-10 days.    Coreen Craig RN, [facility]  RN Care Coordinator  [facility]  Phone # [phone]  4/19/2018 9:01 AM

## 2018-04-19 NOTE — PROGRESS NOTES
SUBJECTIVE:   Antoine Russo is a 72 year old male who presents to clinic today for the following health issues:          Hospital Follow-up Visit:    Hospital/Nursing Home/IP Rehab Facility: [facility]  Date of Admission: 4/16/2018  Date of Discharge: 4/18/2018  Reason(s) for Admission: Acute pancreatitis            Problems taking medications regularly:  None       Medication changes since discharge: None       Problems adhering to non-medication therapy:  None    Summary of hospitalization:  [facility] discharge summary reviewed  Diagnostic Tests/Treatments reviewed.  Follow up needed: INR  Other Healthcare Providers Involved in Patient s Care:         None  Update since discharge: stable.     Post Discharge Medication Reconciliation: discharge medications reconciled, continue medications without change.  Plan of care communicated with patient     Coding guidelines for this visit:  Type of Medical   Decision Making Face-to-Face Visit       within 7 Days of discharge Face-to-Face Visit        within 14 days of discharge   Moderate Complexity [id number]   High Complexity [id number]            Another bout of pancreatitis, Dr Carlisle took out one of the pancreatic stents that was blocked. He is still having pain, 4/10 pain now. He has decreased appetite. Did have Boost in the hospital and liked it. They feel he is getting malnurished.     Left ear feels plugged.    Diabetes-controlled on metformin and glipizide  Lab Results   Component Value Date    A1C 7.7 03/16/2018    A1C 8.1 01/23/2018    A1C 7.0 10/10/2017    A1C 6.4 07/13/2017    A1C 8.9 04/04/2017      hypertension-on metoprolol and lisinopril    afib-on warfarin, metoprolol for rate control    Problem list and histories reviewed & adjusted, as indicated.  Additional history: as documented    BP Readings from Last 3 Encounters:   04/20/18 105/63   04/19/18 108/64   04/18/18 115/83    Wt Readings from Last 3 Encounters:    04/19/18 185 lb (83.9 kg)   04/17/18 183 lb 6.4 oz (83.2 kg)   04/16/18 181 lb 9.6 oz (82.4 kg)                    Reviewed and updated as needed this visit by clinical staff  Tobacco  Allergies  Meds       Reviewed and updated as needed this visit by Provider         ROS:  CONSTITUTIONAL: NEGATIVE for fever, chills, positive for change in weight  ENT/MOUTH: positive for left ear plugging  RESP: NEGATIVE for significant cough or SOB  CV: NEGATIVE for chest pain, palpitations or peripheral edema  GI: epigastric pain as above  : negative for dysuria, hematuria, decreased urinary stream, erectile dysfunction    OBJECTIVE:                                                    /64 (BP Location: Left arm)  Pulse 124  Temp 99.1  F (37.3  C) (Tympanic)  Resp 20  Wt 185 lb (83.9 kg)  SpO2 96%  BMI 27.56 kg/m2  Body mass index is 27.56 kg/(m^2).  GENERAL: appears fatigued,  alert, no distress  HENT: ear canals- normal; TMs- normal; Nose- normal; Mouth- no ulcers, no lesions  NECK: no tenderness, no adenopathy, no asymmetry, no masses, no stiffness; thyroid- normal to palpation  RESP: lungs clear to auscultation - no rales, no rhonchi, no wheezes  CV: regular rate and rhythm today, normal S1 S2, no S3 or S4 and no murmur, no click or rub -  ABDOMEN: soft, moderate epigastric tenderness, no  hepatosplenomegaly, no masses, hyperacitvel bowel sounds, multiple abdominal scars  MS: extremities- no gross deformities noted, no edema       ASSESSMENT/PLAN:                                                      ASSESSMENT:  1. Recurrent pancreatitis (H)    2. Mild malnutrition (H)    3. Chronic atrial fibrillation (H)    4. Hypertension, benign essential, goal below 140/90    5. Type 2 diabetes mellitus with diabetic polyneuropathy, without long-term current use of insulin (H)    6. Sensation of plugged ear, left        PLAN:  Orders Placed This Encounter     NUTRITION REFERRAL     Ears looks ok, monitor  As below  Still  having pain, to emergency department if worsens    Patient Instructions   See the nutritionist  Drink one Boost daily, or two if desired, just keep the amount the same    INR Monday as planned    See you in a few weeks         Katie Gross MD  [facility]

## 2018-04-19 NOTE — MR AVS SNAPSHOT
After Visit Summary   4/19/2018    Antoine Russo    MRN: [id number]           Patient Information     Date Of Birth          1945        Visit Information        Provider Department      4/19/2018 2:20 PM Katie Gross MD [facility]        Today's Diagnoses     Mild malnutrition (H)    -  1      Care Instructions    See the nutritionist  Drink one Boost daily, or two if desired, just keep the amount the same    INR Monday as planned    See you in a few weeks              Follow-ups after your visit        Additional Services     NUTRITION REFERRAL       Your provider has referred you to: [facility]: [facility] [phone]   [email or web address]    Please be aware that coverage of these services is subject to the terms and limitations of your health insurance plan.  Call member services at your health plan with any benefit or coverage questions.      Please bring the following with you to your appointment:    (1) This referral request  (2) Any documents given to you regarding this referral  (3) Any specific questions you have about diet and/or food choices                  Your next 10 appointments already scheduled     Apr 23, 2018  8:30 AM CDT   [facility] with [facility] AN[facility]AG   [facility]    [address] [place] [address]   [phone]            Apr 26, 2018  9:45 AM CDT   Anticoagulation Visit with NB ANTI COAG   [facility]    [address] [place] [address]   [phone]            May 07, 2018  8:20 AM CDT   (Arrive by 8:05 AM)   CT ABDOMEN PELVIS W/O & W CONTRAST with UCCT2   [facility] ([facility])    [address] [facility]  1st Floor  [facility] [address] [phone]           Please bring any scans or X-rays taken at other hospitals, if  similar tests were done. Also bring a list of your medicines, including vitamins, minerals and over-the-counter drugs. It is safest to leave personal items at home.  Be sure to tell your doctor:   If you have any allergies.   If there s any chance you are pregnant.   If you are breastfeeding.  You may have contrast for this exam. To prepare:   Do not eat or drink for 2 hours before your exam. If you need to take medicine, you may take it with small sips of water. (We may ask you to take liquid medicine as well.)   The day before your exam, drink extra fluids at least six 8-ounce glasses (unless your doctor tells you to restrict your fluids).   You will be given instructions on how to drink the contrast.  Patients over 70 or patients with diabetes or kidney problems:   If you haven t had a blood test (creatinine test) within the last 30 days, the Cardiologist/Radiologist may require you to get this test prior to your exam.  If you have diabetes:   Continue to take your metformin medication on the day of your exam  Please wear loose clothing, such as a sweat suit or jogging clothes. Avoid snaps, zippers and other metal. We may ask you to undress and put on a hospital gown.  If you have any questions, please call the Imaging Department where you will have your exam.            May 07, 2018 11:00 AM CDT   (Arrive by 10:45 AM)   Return Visit with Yovanny Beasley MD   [facility] ([facility] [place])    [address] [place] [address] [phone]              Who to contact     If you have questions or need follow up information about today's clinic visit or your schedule please contact [facility] directly at [phone].  Normal or non-critical lab and imaging results will be communicated to you by MyChart, letter or phone within 4 business days after the clinic has received the results. If you do not hear from us within 7 days,  please contact the clinic through Path[employer]To or phone. If you have a critical or abnormal lab result, we will notify you by phone as soon as possible.  Submit refill requests through Path.To or call your pharmacy and they will forward the refill request to us. Please allow 3 business days for your refill to be completed.          Additional Information About Your Visit        [facility] Information     Path[employer]To gives you secure access to your electronic health record. If you see a primary care provider, you can also send messages to your care team and make appointments. If you have questions, please call your primary care clinic.  If you do not have a primary care provider, please call [phone] and they will assist you.        Care EveryWhere ID     This is your Care EveryWhere ID. This could be used by other organizations to access your [facility] medical records  [id number]        Your Vitals Were     Pulse Temperature Respirations Pulse Oximetry BMI (Body Mass Index)       124 99.1  F (37.3  C) (Tympanic) 20 96% 27.56 kg/m2        Blood Pressure from Last 3 Encounters:   04/19/18 108/64   04/18/18 115/83   04/16/18 110/60    Weight from Last 3 Encounters:   04/19/18 185 lb (83.9 kg)   04/17/18 183 lb 6.4 oz (83.2 kg)   04/16/18 181 lb 9.6 oz (82.4 kg)              We Performed the Following     NUTRITION REFERRAL          Today's Medication Changes          These changes are accurate as of 4/19/18  3:00 PM.  If you have any questions, ask your nurse or doctor.               These medicines have changed or have updated prescriptions.        Dose/Directions    multivitamin, therapeutic with minerals Tabs tablet   This may have changed:  when to take this   Used for:  Surgery aftercare        Dose:  1 tablet   Take 1 tablet by mouth daily.   Quantity:  30 each   Refills:  1                Primary Care Provider Office Phone # Fax #    Katie Gross [phone] [address] [place] [address]  [phone]        Equal Access to Services     [facility] : Hadii aad ku hadestrelladonna Elodiaali, wazroaidada preciousjonahha, qamariselmckenna fajardotobiaskoffi isidro. So [facility] [phone].    ATENCIÓN: Si habla español, tiene a snyder disposición servicios gratuitos de asistencia lingüística. Llame al [phone].    We comply with applicable federal civil rights laws and [place] laws. We do not discriminate on the basis of race, color, national origin, age, disability, sex, sexual orientation, or gender identity.            Thank you!     Thank you for choosing [facility]  for your care. Our goal is always to provide you with excellent care. Hearing back from our patients is one way we can continue to improve our services. Please take a few minutes to complete the written survey that you may receive in the mail after your visit with us. Thank you!             Your Updated Medication List - Protect others around you: Learn how to safely use, store and throw away your medicines at www.disposemymeds.org.          This list is accurate as of 4/19/18  3:00 PM.  Always use your most recent med list.                   Brand Name Dispense Instructions for use Diagnosis    ASPIRIN NOT PRESCRIBED    INTENTIONAL     Antiplatelet medication not prescribed intentionally due to Current anticoagulant therapy (warfarin/enoxaparin)        gabapentin 300 MG capsule    NEURONTIN    180 capsule    Take 2 tablest (600 mg) every night    Type 2 diabetes mellitus with diabetic polyneuropathy, without long-term current use of insulin (H)       glipiZIDE 2.5 MG 24 hr tablet    glipiZIDE XL    90 tablet    Take 1 tablet (2.5 mg) by mouth every morning    Type 2 diabetes mellitus with diabetic polyneuropathy, without long-term current use of insulin (H)       HYDROmorphone 2 MG tablet    DILAUDID    10 tablet    Take 1 tablet (2 mg) by mouth every 3 hours as needed for moderate to severe pain    Acute recurrent  pancreatitis       lisinopril 10 MG tablet    PRINIVIL/ZESTRIL    90 tablet    Take 1 tablet (10 mg) by mouth daily    Persistent proteinuria       metFORMIN 500 MG 24 hr tablet    GLUCOPHAGE-XR    360 tablet    Take 2 tablets (1,000 mg) by mouth 2 times daily (with meals)    Type 2 diabetes mellitus with diabetic polyneuropathy, without long-term current use of insulin (H)       metoprolol succinate 50 MG 24 hr tablet    TOPROL-XL    180 tablet    Take 1 tablet (50 mg) by mouth 2 times daily    Hypertension, benign essential, goal below 140/90       multivitamin, therapeutic with minerals Tabs tablet     30 each    Take 1 tablet by mouth daily.    Surgery aftercare       polyethylene glycol Packet    MIRALAX/GLYCOLAX    7 packet    Take 17 g by mouth daily as needed for constipation    Constipation, unspecified constipation type       tamsulosin 0.4 MG capsule    FLOMAX    90 capsule    Take 1 capsule (0.4 mg) by mouth daily    Urgency of urination, Hypertrophy of prostate without urinary obstruction

## 2018-04-20 ENCOUNTER — HOSPITAL ENCOUNTER (INPATIENT)
Facility: CLINIC | Age: 73
LOS: 1 days | Discharge: HOME | End: 2018-04-21
Attending: EMERGENCY MEDICINE | Admitting: INTERNAL MEDICINE
Payer: COMMERCIAL

## 2018-04-20 DIAGNOSIS — K85.90 ACUTE RECURRENT PANCREATITIS: ICD-10-CM

## 2018-04-20 DIAGNOSIS — R10.13 ABDOMINAL PAIN, EPIGASTRIC: ICD-10-CM

## 2018-04-20 DIAGNOSIS — K85.91 NECROTIZING PANCREATITIS: ICD-10-CM

## 2018-04-20 LAB
ALBUMIN SERPL-MCNC: 2.2 G/DL (ref 3.4–5)
ALBUMIN UR-MCNC: 10 MG/DL
ALP SERPL-CCNC: 72 U/L (ref 40–150)
ALT SERPL W P-5'-P-CCNC: 10 U/L (ref 0–70)
ANION GAP SERPL CALCULATED.3IONS-SCNC: 6 MMOL/L (ref 3–14)
APPEARANCE UR: CLEAR
AST SERPL W P-5'-P-CCNC: 7 U/L (ref 0–45)
BASOPHILS # BLD AUTO: 0 10E9/L (ref 0–0.2)
BASOPHILS NFR BLD AUTO: 0 %
BILIRUB SERPL-MCNC: 0.7 MG/DL (ref 0.2–1.3)
BILIRUB UR QL STRIP: NEGATIVE
BUN SERPL-MCNC: 8 MG/DL (ref 7–30)
CALCIUM SERPL-MCNC: 9.1 MG/DL (ref 8.5–10.1)
CHLORIDE SERPL-SCNC: 99 MMOL/L (ref 94–109)
CO2 SERPL-SCNC: 29 MMOL/L (ref 20–32)
COLOR UR AUTO: YELLOW
CREAT SERPL-MCNC: 0.92 MG/DL (ref 0.66–1.25)
DEPRECATED CALCIDIOL+CALCIFEROL SERPL-MC: 28 UG/L (ref 20–75)
DIFFERENTIAL METHOD BLD: ABNORMAL
EOSINOPHIL # BLD AUTO: 0 10E9/L (ref 0–0.7)
EOSINOPHIL NFR BLD AUTO: 0 %
ERYTHROCYTE [DISTWIDTH] IN BLOOD BY AUTOMATED COUNT: 15.3 % (ref 10–15)
GFR SERPL CREATININE-BSD FRML MDRD: 82 ML/MIN/1.7M2
GLUCOSE BLDC GLUCOMTR-MCNC: 132 MG/DL (ref 70–99)
GLUCOSE BLDC GLUCOMTR-MCNC: 133 MG/DL (ref 70–99)
GLUCOSE BLDC GLUCOMTR-MCNC: 160 MG/DL (ref 70–99)
GLUCOSE BLDC GLUCOMTR-MCNC: 92 MG/DL (ref 70–99)
GLUCOSE SERPL-MCNC: 135 MG/DL (ref 70–99)
GLUCOSE UR STRIP-MCNC: NEGATIVE MG/DL
HCT VFR BLD AUTO: 34.7 % (ref 40–53)
HGB BLD-MCNC: 11.1 G/DL (ref 13.3–17.7)
HGB UR QL STRIP: NEGATIVE
INR PPP: 1.86 (ref 0.86–1.14)
INTERPRETATION ECG - MUSE: NORMAL
KETONES UR STRIP-MCNC: NEGATIVE MG/DL
LACTATE BLD-SCNC: 1.1 MMOL/L (ref 0.4–1.9)
LEUKOCYTE ESTERASE UR QL STRIP: NEGATIVE
LIPASE SERPL-CCNC: 552 U/L (ref 73–393)
LYMPHOCYTES # BLD AUTO: 2.3 10E9/L (ref 0.8–5.3)
LYMPHOCYTES NFR BLD AUTO: 11 %
MCH RBC QN AUTO: 31.7 PG (ref 26.5–33)
MCHC RBC AUTO-ENTMCNC: 32 G/DL (ref 31.5–36.5)
MCV RBC AUTO: 99 FL (ref 78–100)
MONOCYTES # BLD AUTO: 0.5 10E9/L (ref 0–1.3)
MONOCYTES NFR BLD AUTO: 2.5 %
MUCOUS THREADS #/AREA URNS LPF: PRESENT /LPF
NEUTROPHILS # BLD AUTO: 18.3 10E9/L (ref 1.6–8.3)
NEUTROPHILS NFR BLD AUTO: 86.5 %
NITRATE UR QL: NEGATIVE
PH UR STRIP: 6.5 PH (ref 5–7)
PLATELET # BLD AUTO: 439 10E9/L (ref 150–450)
PLATELET # BLD EST: ABNORMAL 10*3/UL
POTASSIUM SERPL-SCNC: 4.3 MMOL/L (ref 3.4–5.3)
PREALB SERPL IA-MCNC: 7 MG/DL (ref 15–45)
PROT SERPL-MCNC: 6.8 G/DL (ref 6.8–8.8)
RBC # BLD AUTO: 3.5 10E12/L (ref 4.4–5.9)
RBC #/AREA URNS AUTO: <1 /HPF (ref 0–2)
SODIUM SERPL-SCNC: 134 MMOL/L (ref 133–144)
SOURCE: ABNORMAL
SP GR UR STRIP: 1.01 (ref 1–1.03)
UROBILINOGEN UR STRIP-MCNC: NORMAL MG/DL (ref 0–2)
WBC # BLD AUTO: 21.2 10E9/L (ref 4–11)
WBC #/AREA URNS AUTO: 3 /HPF (ref 0–5)

## 2018-04-20 PROCEDURE — 36415 COLL VENOUS BLD VENIPUNCTURE: CPT | Performed by: PHYSICIAN ASSISTANT

## 2018-04-20 PROCEDURE — 25000128 H RX IP 250 OP 636: Performed by: EMERGENCY MEDICINE

## 2018-04-20 PROCEDURE — 40000141 ZZH STATISTIC PERIPHERAL IV START W/O US GUIDANCE

## 2018-04-20 PROCEDURE — 25000132 ZZH RX MED GY IP 250 OP 250 PS 637: Performed by: PHYSICIAN ASSISTANT

## 2018-04-20 PROCEDURE — 83605 ASSAY OF LACTIC ACID: CPT | Performed by: INTERNAL MEDICINE

## 2018-04-20 PROCEDURE — 81001 URINALYSIS AUTO W/SCOPE: CPT | Performed by: EMERGENCY MEDICINE

## 2018-04-20 PROCEDURE — 82306 VITAMIN D 25 HYDROXY: CPT | Performed by: PHYSICIAN ASSISTANT

## 2018-04-20 PROCEDURE — 12000001 ZZH R&B MED SURG/OB UMMC

## 2018-04-20 PROCEDURE — 25000132 ZZH RX MED GY IP 250 OP 250 PS 637: Performed by: INTERNAL MEDICINE

## 2018-04-20 PROCEDURE — 85027 COMPLETE CBC AUTOMATED: CPT | Performed by: PHYSICIAN ASSISTANT

## 2018-04-20 PROCEDURE — 84134 ASSAY OF PREALBUMIN: CPT | Performed by: EMERGENCY MEDICINE

## 2018-04-20 PROCEDURE — 84590 ASSAY OF VITAMIN A: CPT | Performed by: PHYSICIAN ASSISTANT

## 2018-04-20 PROCEDURE — 83690 ASSAY OF LIPASE: CPT | Performed by: EMERGENCY MEDICINE

## 2018-04-20 PROCEDURE — 80053 COMPREHEN METABOLIC PANEL: CPT | Performed by: EMERGENCY MEDICINE

## 2018-04-20 PROCEDURE — 25000128 H RX IP 250 OP 636: Performed by: PHYSICIAN ASSISTANT

## 2018-04-20 PROCEDURE — 80321 ALCOHOLS BIOMARKERS 1OR 2: CPT | Performed by: PHYSICIAN ASSISTANT

## 2018-04-20 PROCEDURE — 87040 BLOOD CULTURE FOR BACTERIA: CPT | Performed by: EMERGENCY MEDICINE

## 2018-04-20 PROCEDURE — 84597 ASSAY OF VITAMIN K: CPT | Performed by: PHYSICIAN ASSISTANT

## 2018-04-20 PROCEDURE — 00000146 ZZHCL STATISTIC GLUCOSE BY METER IP

## 2018-04-20 PROCEDURE — 25000132 ZZH RX MED GY IP 250 OP 250 PS 637: Performed by: STUDENT IN AN ORGANIZED HEALTH CARE EDUCATION/TRAINING PROGRAM

## 2018-04-20 PROCEDURE — 25000128 H RX IP 250 OP 636: Performed by: INTERNAL MEDICINE

## 2018-04-20 PROCEDURE — 84446 ASSAY OF VITAMIN E: CPT | Performed by: PHYSICIAN ASSISTANT

## 2018-04-20 PROCEDURE — 85610 PROTHROMBIN TIME: CPT | Performed by: EMERGENCY MEDICINE

## 2018-04-20 PROCEDURE — 25000132 ZZH RX MED GY IP 250 OP 250 PS 637

## 2018-04-20 PROCEDURE — 85025 COMPLETE CBC W/AUTO DIFF WBC: CPT | Performed by: EMERGENCY MEDICINE

## 2018-04-20 PROCEDURE — 99239 HOSP IP/OBS DSCHRG MGMT >30: CPT | Performed by: PHYSICIAN ASSISTANT

## 2018-04-20 PROCEDURE — 99223 1ST HOSP IP/OBS HIGH 75: CPT | Mod: AI | Performed by: INTERNAL MEDICINE

## 2018-04-20 RX ORDER — GABAPENTIN 300 MG/1
600 CAPSULE ORAL AT BEDTIME
Status: DISCONTINUED | OUTPATIENT
Start: 2018-04-20 | End: 2018-04-21 | Stop reason: HOSPADM

## 2018-04-20 RX ORDER — SODIUM CHLORIDE 9 MG/ML
INJECTION, SOLUTION INTRAVENOUS CONTINUOUS
Status: DISCONTINUED | OUTPATIENT
Start: 2018-04-20 | End: 2018-04-20

## 2018-04-20 RX ORDER — GLIPIZIDE 2.5 MG/1
2.5 TABLET, EXTENDED RELEASE ORAL EVERY MORNING
Status: DISCONTINUED | OUTPATIENT
Start: 2018-04-20 | End: 2018-04-20

## 2018-04-20 RX ORDER — METOPROLOL SUCCINATE 50 MG/1
50 TABLET, EXTENDED RELEASE ORAL 2 TIMES DAILY
Status: DISCONTINUED | OUTPATIENT
Start: 2018-04-20 | End: 2018-04-21 | Stop reason: HOSPADM

## 2018-04-20 RX ORDER — LIDOCAINE 40 MG/G
CREAM TOPICAL
Status: DISCONTINUED | OUTPATIENT
Start: 2018-04-20 | End: 2018-04-21 | Stop reason: HOSPADM

## 2018-04-20 RX ORDER — HYDROMORPHONE HYDROCHLORIDE 2 MG/1
2 TABLET ORAL
Status: DISCONTINUED | OUTPATIENT
Start: 2018-04-20 | End: 2018-04-21 | Stop reason: HOSPADM

## 2018-04-20 RX ORDER — SODIUM CHLORIDE, SODIUM LACTATE, POTASSIUM CHLORIDE, CALCIUM CHLORIDE 600; 310; 30; 20 MG/100ML; MG/100ML; MG/100ML; MG/100ML
INJECTION, SOLUTION INTRAVENOUS CONTINUOUS
Status: DISCONTINUED | OUTPATIENT
Start: 2018-04-20 | End: 2018-04-21 | Stop reason: HOSPADM

## 2018-04-20 RX ORDER — POLYETHYLENE GLYCOL 3350 17 G/17G
17 POWDER, FOR SOLUTION ORAL DAILY PRN
Status: DISCONTINUED | OUTPATIENT
Start: 2018-04-20 | End: 2018-04-21 | Stop reason: HOSPADM

## 2018-04-20 RX ORDER — LISINOPRIL 10 MG/1
10 TABLET ORAL DAILY
Status: DISCONTINUED | OUTPATIENT
Start: 2018-04-20 | End: 2018-04-21 | Stop reason: HOSPADM

## 2018-04-20 RX ORDER — TAMSULOSIN HYDROCHLORIDE 0.4 MG/1
0.4 CAPSULE ORAL DAILY
Status: DISCONTINUED | OUTPATIENT
Start: 2018-04-20 | End: 2018-04-21 | Stop reason: HOSPADM

## 2018-04-20 RX ORDER — NALOXONE HYDROCHLORIDE 0.4 MG/ML
.1-.4 INJECTION, SOLUTION INTRAMUSCULAR; INTRAVENOUS; SUBCUTANEOUS
Status: DISCONTINUED | OUTPATIENT
Start: 2018-04-20 | End: 2018-04-21 | Stop reason: HOSPADM

## 2018-04-20 RX ORDER — WARFARIN SODIUM 2.5 MG/1
2.5 TABLET ORAL
Status: COMPLETED | OUTPATIENT
Start: 2018-04-20 | End: 2018-04-20

## 2018-04-20 RX ORDER — HYDROMORPHONE HYDROCHLORIDE 1 MG/ML
1 INJECTION, SOLUTION INTRAMUSCULAR; INTRAVENOUS; SUBCUTANEOUS ONCE
Status: COMPLETED | OUTPATIENT
Start: 2018-04-20 | End: 2018-04-20

## 2018-04-20 RX ORDER — MULTIPLE VITAMINS W/ MINERALS TAB 9MG-400MCG
1 TAB ORAL DAILY
Status: DISCONTINUED | OUTPATIENT
Start: 2018-04-20 | End: 2018-04-21 | Stop reason: HOSPADM

## 2018-04-20 RX ORDER — NICOTINE POLACRILEX 4 MG
15-30 LOZENGE BUCCAL
Status: DISCONTINUED | OUTPATIENT
Start: 2018-04-20 | End: 2018-04-21 | Stop reason: HOSPADM

## 2018-04-20 RX ORDER — DEXTROSE MONOHYDRATE 25 G/50ML
25-50 INJECTION, SOLUTION INTRAVENOUS
Status: DISCONTINUED | OUTPATIENT
Start: 2018-04-20 | End: 2018-04-20

## 2018-04-20 RX ADMIN — SODIUM CHLORIDE 500 ML: 9 INJECTION, SOLUTION INTRAVENOUS at 03:14

## 2018-04-20 RX ADMIN — SODIUM CHLORIDE, POTASSIUM CHLORIDE, SODIUM LACTATE AND CALCIUM CHLORIDE: 600; 310; 30; 20 INJECTION, SOLUTION INTRAVENOUS at 18:37

## 2018-04-20 RX ADMIN — SODIUM CHLORIDE: 9 INJECTION, SOLUTION INTRAVENOUS at 05:33

## 2018-04-20 RX ADMIN — METOPROLOL SUCCINATE 50 MG: 50 TABLET, EXTENDED RELEASE ORAL at 08:32

## 2018-04-20 RX ADMIN — HYDROMORPHONE HYDROCHLORIDE 2 MG: 2 TABLET ORAL at 20:34

## 2018-04-20 RX ADMIN — HYDROMORPHONE HYDROCHLORIDE 2 MG: 2 TABLET ORAL at 07:13

## 2018-04-20 RX ADMIN — HYDROMORPHONE HYDROCHLORIDE 1 MG: 1 INJECTION, SOLUTION INTRAMUSCULAR; INTRAVENOUS; SUBCUTANEOUS at 02:04

## 2018-04-20 RX ADMIN — MULTIPLE VITAMINS W/ MINERALS TAB 1 TABLET: TAB at 14:11

## 2018-04-20 RX ADMIN — SODIUM CHLORIDE, POTASSIUM CHLORIDE, SODIUM LACTATE AND CALCIUM CHLORIDE: 600; 310; 30; 20 INJECTION, SOLUTION INTRAVENOUS at 11:51

## 2018-04-20 RX ADMIN — TAMSULOSIN HYDROCHLORIDE 0.4 MG: 0.4 CAPSULE ORAL at 08:32

## 2018-04-20 RX ADMIN — LISINOPRIL 10 MG: 10 TABLET ORAL at 08:32

## 2018-04-20 RX ADMIN — HYDROMORPHONE HYDROCHLORIDE 2 MG: 2 TABLET ORAL at 11:01

## 2018-04-20 RX ADMIN — GABAPENTIN 600 MG: 300 CAPSULE ORAL at 21:32

## 2018-04-20 RX ADMIN — METOPROLOL SUCCINATE 50 MG: 50 TABLET, EXTENDED RELEASE ORAL at 19:26

## 2018-04-20 RX ADMIN — HYDROMORPHONE HYDROCHLORIDE 2 MG: 2 TABLET ORAL at 14:09

## 2018-04-20 RX ADMIN — WARFARIN SODIUM 2.5 MG: 2.5 TABLET ORAL at 18:38

## 2018-04-20 RX ADMIN — SODIUM CHLORIDE 500 ML: 9 INJECTION, SOLUTION INTRAVENOUS at 01:57

## 2018-04-20 ASSESSMENT — ENCOUNTER SYMPTOMS
ABDOMINAL PAIN: 1
NAUSEA: 0
DYSURIA: 0
CONSTIPATION: 0
CHILLS: 0
COUGH: 0
SORE THROAT: 0
HEMATURIA: 0
FREQUENCY: 0
RHINORRHEA: 0
FEVER: 0
BLOOD IN STOOL: 0
PALPITATIONS: 0
SHORTNESS OF BREATH: 0
VOMITING: 0
DIFFICULTY URINATING: 0
DIARRHEA: 1

## 2018-04-20 ASSESSMENT — PAIN DESCRIPTION - DESCRIPTORS
DESCRIPTORS: ACHING
DESCRIPTORS: DISCOMFORT

## 2018-04-20 ASSESSMENT — ACTIVITIES OF DAILY LIVING (ADL)
ADLS_ACUITY_SCORE: 11

## 2018-04-20 NOTE — ED PROVIDER NOTES
[place] EMERGENCY DEPARTMENT ([facility])  4/20/18   History     Chief Complaint   Patient presents with     Abdominal Pain     The history is provided by the patient.     Antoine Russo is a 72 year old male with a medical history significant for recurrent pancreatitis and IPMN s/p distal pancreatectomy c/b pancreatic leak in 2016 s/p ERCP with biliary sphincterectomy and stent placement (CBD and PD to decompress leak) 3/29/2017, atrial fibrillation with RVR, hypertension, type 2 diabetes mellitus, peripheral neuropathy and BPH.  Per review of patient's chart, patient was recently hospitalized here from 4/16/2018 to 4/18/2018 with acute pancreatitis.  He had previously been admitted 3/30/18 - 4/4/18 with acute pancreatitis and sepsis requiring ICU stay.  Patient underwent an abd/pelvis CT on 4/16/2018 which showed no evidence of pancreatic necrosis of the pancreatic head/uncinate process, necrotic lymph nodes.  Patient underwent an EGD with stent removal on 4/17/2018.    Patient presents to the Emergency Department today for evaluation of continued abdominal pain.  The patient states that since being discharged and having the stent removed, he has had some mild improvement of his abdominal pain.  Patient states that earlier tonight, around 6 PM he began having worsening abdominal pain once again.  He localizes his pain to the upper and lower left-sided abdomen.  He describes the pain as waxing and waning in nature.  The patient did take PO Dilaudid 2 mg tonight with no improvement of his pain.  The patient reports that the only thing he ate today was cereal in the morning, he states that the pain is not associated with eating.  He denies any nausea or vomiting.  He states that he does have diarrhea, but states that this is his baseline.  He denies any fevers, chills or urinary symptoms.  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    I have reviewed the Medications,  Allergies, Past Medical and Surgical History, and Social History in the [facility] system.    Past Medical History:   Diagnosis Date     C. 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     Noninfectious ileitis      Recurrent pancreatitis (H)        Past Surgical History:   Procedure Laterality Date     BREAST SURGERY  2008    right breast mass benign     COLONOSCOPY      multiple polyps removed     COLONOSCOPY  8/24/2011    Procedure:COMBINED COLONOSCOPY, REMOVE TUMOR/POLYP/LESION BY SNARE; Surgeon:MILEY ARBOLEDA; Location:[facility]     COLONOSCOPY  12/17/2012    Procedure: COLONOSCOPY;;  Surgeon: Leon Maurer MD;  [facility]     COLONOSCOPY  12/18/2012    Procedure: COLONOSCOPY;;  Surgeon: Leon Maurer MD;  [facility]     DENERVATION OF SPERMATIC CORD MICROSURGICAL Left 5/23/2017    Procedure: DENERVATION OF SPERMATIC CORD MICROSURGICAL;;  Surgeon: Marcio Aggarwal MD;  [facility]     DISSECTION RADICAL NECK BILATERAL  8/2/2012    Procedure: DISSECTION RADICAL NECK BILATERAL;;  Surgeon: Yung Alvares MD;  [facility]     ENDOSCOPIC RETROGRADE CHOLANGIOPANCREATOGRAM N/A 5/10/2016    Procedure: COMBINED ENDOSCOPIC RETROGRADE CHOLANGIOPANCREATOGRAPHY, PLACE TUBE/STENT;  Surgeon: Yovanny Beasley MD;  [facility]     ENDOSCOPIC RETROGRADE CHOLANGIOPANCREATOGRAM N/A 3/29/2018    Procedure: ENDOSCOPIC RETROGRADE CHOLANGIOPANCREATOGRAM;  Endoscopic Retrograde Cholangiopancreatogram, Endoscopic Ultrasound, Biliary Sphincterotomy, Biliary and Pancreatic Stent Placement;  Surgeon: Yovanny Beasley MD;  [facility]     ENDOSCOPIC ULTRASOUND UPPER GASTROINTESTINAL TRACT (GI) N/A 2/3/2016    Procedure: ENDOSCOPIC ULTRASOUND, ESOPHAGOSCOPY / UPPER GASTROINTESTINAL TRACT (GI);  Surgeon: Grabiel Plata MD;   [facility]     ENDOSCOPIC ULTRASOUND UPPER GASTROINTESTINAL TRACT (GI) N/A 3/29/2018    Procedure: ENDOSCOPIC ULTRASOUND, ESOPHAGOSCOPY / UPPER GASTROINTESTINAL TRACT (GI);;  Surgeon: Grabiel Plata MD;  [facility]     ESOPHAGOSCOPY, GASTROSCOPY, DUODENOSCOPY (EGD), COMBINED N/A 2/3/2016    Procedure: COMBINED ENDOSCOPIC ULTRASOUND, ESOPHAGOSCOPY, GASTROSCOPY, DUODENOSCOPY (EGD), FINE NEEDLE ASPIRATE/BIOPSY;  Surgeon: Grabiel Plata MD;  [facility]     ESOPHAGOSCOPY, GASTROSCOPY, DUODENOSCOPY (EGD), COMBINED N/A 6/8/2016    Procedure: COMBINED ESOPHAGOSCOPY, GASTROSCOPY, DUODENOSCOPY (EGD), REMOVE FOREIGN BODY;  Surgeon: Yovanny Beasley MD;  [facility]     ESOPHAGOSCOPY, GASTROSCOPY, DUODENOSCOPY (EGD), COMBINED N/A 4/17/2018    Procedure: COMBINED ESOPHAGOSCOPY, GASTROSCOPY, DUODENOSCOPY (EGD), REMOVE FOREIGN BODY;  EGD with stent removal;  Surgeon: Grabiel Plata MD;  [facility]     EXCISE LESION INTRAORAL  6/14/2012    Procedure: EXCISE LESION INTRAORAL;  Wide Local Excision Floor of Mouth, Direct Laryngoscopy, Bilateral Ida's Marsuplization, Split Thickness Skin Graft from right Thigh  Latex Safe;  Surgeon: Gerson Ravi MD;  [facility]     EXCISE LESION INTRAORAL  8/2/2012    Procedure: EXCISE LESION INTRAORAL;  Floor of Mouth Resection, Bilateral Selective Radical Neck Dissection, Tracheostomy, Left Radial Forearm  Free Flap with Alloderm, Nasogastric Feeding Tube Placement,    * Latex Safe*;  Surgeon: Gerson Ravi MD;  [facility]     EXCISE LESION INTRAORAL  12/11/2012    Procedure: EXCISE LESION INTRAORAL;  takedown of oral flap;  Surgeon: Yung Alvares MD;  [facility]     GRAFT FREE VASCULARIZED (LOCATION)  8/2/2012    Procedure: GRAFT FREE VASCULARIZED (LOCATION);;  Surgeon: Yung Alvares MD;  [facility]     GRAFT SKIN SPLIT THICKNESS FROM EXTREMITY  6/14/2012    Procedure: GRAFT SKIN SPLIT THICKNESS FROM EXTREMITY;;   Surgeon: Gerson Ravi MD;  [facility]     LAPAROSCOPIC ILEOSTOMY TAKEDOWN  2013    Procedure: LAPAROSCOPIC ILEOSTOMY TAKEDOWN;  Laparoscopic Closure of Enterostomy, Guerda's Type with IleoRectal Anastomosis ;  Surgeon: Grabiel Riddle MD;  [facility]     LAPAROTOMY EXPLORATORY  2012    Procedure: LAPAROTOMY EXPLORATORY;  Exploratory Laparotomy, total abdominal colectomy, ileostomy formation;  Surgeon: Miquel Cannon MD;  [facility]     LARYNGOSCOPY  2012    Procedure: LARYNGOSCOPY;;  Surgeon: Gerson Ravi MD;  [facility]     ORTHOPEDIC SURGERY      ganglian cyst left ankle     PANCREATECTOMY, SPLENECTOMY N/A 3/10/2016    Procedure: PANCREATECTOMY, SPLENECTOMY;  Surgeon: Nael Abel MD;  [facility]     SHOULDER SURGERY  , 2008- right rotator cuff,  bone spur on left. Dr. Hdez     VARICOCELECTOMY Left 2017    Procedure: VARICOCELECTOMY;  Left Varicocele Repair, Denervation of Left Testis;  Surgeon: Marcio Aggarwal MD;  [facility]       Family History   Problem Relation Age of Onset     DIABETES Sister      onset age 50     Alzheimer Disease Mother       80     Alzheimer Disease Father       85     DIABETES Other      nephew type 1     DIABETES Other      Anesthesia Reaction No family hx of      Colon Cancer No family hx of      Colon Polyps No family hx of      Crohn Disease No family hx of      Ulcerative Colitis No family hx of        Social History   Substance Use Topics     Smoking status: Former Smoker     Packs/day: 1.00     Years: 40.00     Types: Cigarettes     Quit date: 2006     Smokeless tobacco: Never Used     Alcohol use No       Current Facility-Administered Medications   Medication     0.9% sodium chloride BOLUS     0.9% sodium chloride BOLUS     Current Outpatient Prescriptions   Medication     ASPIRIN NOT PRESCRIBED (INTENTIONAL)     gabapentin (NEURONTIN) 300 MG capsule     glipiZIDE (GLIPIZIDE XL)  2.5 MG 24 hr tablet     HYDROmorphone (DILAUDID) 2 MG tablet     lisinopril (PRINIVIL/ZESTRIL) 10 MG tablet     metFORMIN (GLUCOPHAGE-XR) 500 MG 24 hr tablet     metoprolol succinate (TOPROL-XL) 50 MG 24 hr tablet     multivitamin, therapeutic with minerals (THERA-VIT-M) TABS     polyethylene glycol (MIRALAX/GLYCOLAX) Packet     tamsulosin (FLOMAX) 0.4 MG capsule        Allergies   Allergen Reactions     Nkda [No Known Drug Allergies]          Review of Systems   Constitutional: Negative for chills and fever.   HENT: Negative for rhinorrhea and sore throat.    Respiratory: Negative for cough and shortness of breath.    Cardiovascular: Negative for chest pain and palpitations.   Gastrointestinal: Positive for abdominal pain (left sided) and diarrhea (baseline). Negative for blood in stool, constipation, nausea and vomiting.   Genitourinary: Negative for decreased urine volume, difficulty urinating, dysuria, frequency, hematuria and urgency.   All other systems reviewed and are negative.      Physical Exam   BP: 115/77  Pulse: 138  Heart Rate: 107  Temp: 98.9  F (37.2  C)  Resp: 18  SpO2: 95 %      Physical Exam   Constitutional: He is oriented to person, place, and time. He appears well-developed and well-nourished. No distress.   Adult male, lying back in bed, appears uncomfortable   HENT:   Head: Normocephalic.   Mouth/Throat: Oropharynx is clear and moist.   Cardiovascular: Normal heart sounds.    No murmur heard.  Mild tachycardia, irr irr rhythm   Pulmonary/Chest: Effort normal and breath sounds normal. No respiratory distress. He has no wheezes. He has no rales.   Abdominal: Bowel sounds are normal. He exhibits no distension. There is tenderness. There is no rebound and no guarding.   Abdomen with multiple surgical scars. TTP in epigastrium and point of maximal TTP in LUQ and L periumbilical region. Normal bowel sounds.    Musculoskeletal: He exhibits no edema or tenderness.   Neurological: He is alert and  oriented to person, place, and time.   Nursing note and vitals reviewed.      ED Course   12:28 AM  The patient was seen and examined by Sravani Dangelo MD in Room ED08.     ED Course     Procedures             EKG Interpretation:      Interpreted by Sravani Dangelo  Time reviewed: 0055  Symptoms at time of EKG: None   Rhythm: atrial fibrillation - rapid  Rate: 100-110  Axis: Normal  Ectopy: none  Conduction: normal  ST Segments/ T Waves: No acute ischemic changes  Q Waves: none  Comparison to prior: Unchanged    Clinical Impression: atrial fib with RVR (rate 103), unchanged from prior        Critical Care time:  none             Results for orders placed or performed during the hospital encounter of 04/20/18 (from the past 24 hour(s))   EKG 12 lead   Result Value Ref Range    Interpretation ECG Click View Image link to view waveform and result    CBC with platelets differential   Result Value Ref Range    WBC 21.2 (H) 4.0 - 11.0 10e9/L    RBC Count 3.50 (L) 4.4 - 5.9 10e12/L    Hemoglobin 11.1 (L) 13.3 - 17.7 g/dL    Hematocrit 34.7 (L) 40.0 - 53.0 %    MCV 99 78 - 100 fl    MCH 31.7 26.5 - 33.0 pg    MCHC 32.0 31.5 - 36.5 g/dL    RDW 15.3 (H) 10.0 - 15.0 %    Platelet Count 439 150 - 450 10e9/L    Diff Method PENDING    Comprehensive metabolic panel   Result Value Ref Range    Sodium 134 133 - 144 mmol/L    Potassium 4.3 3.4 - 5.3 mmol/L    Chloride 99 94 - 109 mmol/L    Carbon Dioxide 29 20 - 32 mmol/L    Anion Gap 6 3 - 14 mmol/L    Glucose 135 (H) 70 - 99 mg/dL    Urea Nitrogen 8 7 - 30 mg/dL    Creatinine 0.92 0.66 - 1.25 mg/dL    GFR Estimate 82 >60 mL/min/1.7m2    GFR Estimate If Black >90 >60 mL/min/1.7m2    Calcium 9.1 8.5 - 10.1 mg/dL    Bilirubin Total 0.7 0.2 - 1.3 mg/dL    Albumin 2.2 (L) 3.4 - 5.0 g/dL    Protein Total 6.8 6.8 - 8.8 g/dL    Alkaline Phosphatase 72 40 - 150 U/L    ALT 10 0 - 70 U/L    AST 7 0 - 45 U/L   INR   Result Value Ref Range    INR 1.86 (H) 0.86 - 1.14   Lipase   Result  Value Ref Range    Lipase 552 (H) 73 - 393 U/L              Assessments & Plan (with Medical Decision Making)   This is a 72-year-old male with a prior history of IPMN S/P distal pancreatectomy complicated by pancreatic leak in 2016.  He has had recurrent pancreatitis episodes and repeat ERCPs over the past 4-5 months.  CT scan of the abdomen done on April 16 showed new evidence of pancreatic necrosis in the pancreatic head/uncinate process with necrotic lymph nodes.  He had his pancreatic duct stent removed on April 17.  He seemed to be tolerating orals in the hospital and was discharged yesterday, comes back now with increasing pain despite use of his Dilaudid.    On my evaluation, he is alert, cooperative, appears uncomfortable.  He is tachycardic (history of A. fib) and has tenderness to palpation over the LUQ as well as the epigastrium.    We did establish IV access and we did draw blood for laboratory analysis.  CBC shows a white count of 21.2, he has had leukocytosis for the past few weeks however this is up trending now.  CMP shows normal electrolytes, normal kidney function and normal LFTs and INR 1.86.  Lipase today is down today at 552.  Patient was given IV fluids as well as a dose of Dilaudid.    Reviewed recent chart, he had a CT scan of the abdomen done on April 16 that showed evidence of necrotizing process of the pancreas.    He is not febrile.  He had had some tachycardia here in the ED but he is also in A. fib with RVR, initial heart rate was 140 however now he is 101..  Hemodynamically stable.  I spoke to the GI fellow about whether or not we need to re-CT him at this point.  GI does not feel that he needs a repeat CT scan right now, given the fact that his CT scan was just 4 days ago.  Plan will be to admit to medicine and continue to treat his pancreatitis.  GI will see him in the morning and make some further recommendations.  I have ordered blood cultures, as of right now there is no need for  antibiotics.  Suspect that ultimately he may be one of these unfortunate patients who is going to create a peripancreatic abscess which is going to require decompression, however certainly does not appear that he is at that stage yet.  Plan to admit to medicine at this point.    I have ordered a urinalysis as well as blood cultures ×2 which are pending at this point.    I have reviewed the nursing notes.    I have reviewed the findings, diagnosis, plan and need for follow up with the patient.    New Prescriptions    No medications on file       Final diagnoses:   Necrotizing pancreatitis     I, Edvin Medrano, am serving as a trained medical scribe to document services personally performed by Sravani Dangelo MD, based on the provider's statements to me.   I, Sravani Dangleo MD, was physically present and have reviewed and verified the accuracy of this note documented by Edvin Medrano.    4/19/2018   [facility], EMERGENCY DEPARTMENT     Sravani Dangelo MD  04/20/18 0243

## 2018-04-20 NOTE — ED TRIAGE NOTES
Patient presents to triage c/o abdominal pain. Patient was discharged from this facility on Wednesday with pancreatitis and had a stent removed. Pt took 2 mg oral Dilaudid at approximately 2030. Denies n/v.  at triage, history of afib.

## 2018-04-20 NOTE — PROGRESS NOTES
[facility], [place]   ED Nurse to Floor Handoff     Antoine Russo is a 72 year old male who speaks English and lives with a spouse,  in a home  They arrived in the ED by car from home    ED Chief Complaint: Abdominal Pain    ED Dx;   Final diagnoses:   Necrotizing pancreatitis         Needed?: No    Allergies:   Allergies   Allergen Reactions     Nkda [No Known Drug Allergies]    .  Past Medical Hx:   Past Medical History:   Diagnosis Date     C. 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 A/O x 4.    Infection present or suspected this encounter: no  Sepsis suspected: No  Isolation type: No active isolations     Activity level - Baseline/Home:  Independent  Activity Level - Current:   Independent    Bariatric equipment needed?: No    In the ED these meds were given:   Medications   0.9% sodium chloride BOLUS (500 mLs Intravenous New Bag 4/20/18 0314)   0.9% sodium chloride BOLUS (0 mLs Intravenous Stopped 4/20/18 0314)   HYDROmorphone (PF) (DILAUDID) injection 1 mg (1 mg Intravenous Given 4/20/18 0204)       Drips running?  No    Home pump  No    Current LDAs  Peripheral IV 04/16/18 Right Lower forearm (Active)   Number of days:4       Peripheral IV 04/20/18 Left Upper arm (Active)   Number of days:0       Ileostomy (Active)   Number of days:1946       Pressure Ulcer 12/30/12 Left Ear PU 2/2 O2 tubing (Active)   Number of days:1937       Pressure Ulcer 04/05/16 Bilateral Buttocks inner folds of butucks (Active)   Number of days:745       Incision/Surgical Site Anterior Mouth (Active)   Number of days:       Incision/Surgical Site 08/02/12 Bilateral Neck (Active)   Number of  days:2087       Incision/Surgical Site 08/02/12 Left Arm (Active)   Number of days:2087       Incision/Surgical Site 08/02/12 Mid Neck (Active)   Number of days:2087       Incision/Surgical Site 08/02/12 Mouth (Active)   Number of days:2087       Incision/Surgical Site 12/11/12 Inner Mouth (Active)   Number of days:1956       Incision/Surgical Site Inner Mouth (Active)   Number of days:       Incision/Surgical Site 12/20/12 Anterior Abdomen (Active)   Number of days:1947       Incision/Surgical Site 06/06/13 Right Abdomen (Active)   Number of days:1779       Incision/Surgical Site 06/06/13 Abdomen (Active)   Number of days:1779       Incision/Surgical Site 05/23/17 Left Groin (Active)   Number of days:332       Labs results:   Labs Ordered and Resulted from Time of ED Arrival Up to the Time of Departure from the ED   CBC WITH PLATELETS DIFFERENTIAL - Abnormal; Notable for the following:        Result Value    WBC 21.2 (*)     RBC Count 3.50 (*)     Hemoglobin 11.1 (*)     Hematocrit 34.7 (*)     RDW 15.3 (*)     Absolute Neutrophil 18.3 (*)     All other components within normal limits   COMPREHENSIVE METABOLIC PANEL - Abnormal; Notable for the following:     Glucose 135 (*)     Albumin 2.2 (*)     All other components within normal limits   INR - Abnormal; Notable for the following:     INR 1.86 (*)     All other components within normal limits   LIPASE - Abnormal; Notable for the following:     Lipase 552 (*)     All other components within normal limits   ROUTINE UA WITH MICROSCOPIC REFLEX TO CULTURE   PULSE OXIMETRY NURSING   BLOOD CULTURE   BLOOD CULTURE       Imaging Studies: No results found for this or any previous visit (from the past 24 hour(s)).    Recent vital signs:   /59  Pulse 138  Temp 98.9  F (37.2  C) (Oral)  Resp 18  SpO2 97%    Cardiac Rhythm: Tachycardia initially, now sinus rhythm.  Pt needs tele? No  Skin/wound Issues: None    Code Status: Full Code    Pain control: good    Nausea  control: pt had none    Abnormal labs/tests/findings requiring intervention: None    Family present during ED course? Yes   Family Comments/Social Situation comments: Spouse left for [place].    Tasks needing completion: UA.    María Elena Osorio RN  [facility]-- [phone] [place] ED  [phone] [facility]

## 2018-04-20 NOTE — PHARMACY-ADMISSION MEDICATION HISTORY
Admission medication history interview status for the 4/20/2018 admission is complete. See Epic admission navigator for allergy information, pharmacy, prior to admission medications and immunization status.     Medication history interview sources:  Interview with patient, refill history with sure scripts, chart review    Changes made to PTA medication list (reason)  Added: None  Deleted: Miralax (patient not taking, prefers not to use and has only had 1 dose dilaudid prescribed 4/18)  Changed: None    Additional medication history information (including reliability of information, actions taken by pharmacist):  1. Patient is a good historian of which medications he is taking, frequency and time of last dose, dosing of medications was confirmed with refill history at [facility] in [place]  2. Verified no known drug allergies with patient  3. Patient did not receive flu vaccine this season  4. Warfarin managed by [facility] in [place]  -Anticoagulation visit 4/18 INR 3.43  -Indicated for AFib with goal 2-3  -Patient followed plan to hold dose 4/18, take 1.25mg on 4/19  -Plan was to take 1.25mg on 4/20 and then resume 1.25mg on Tues, Sat and 2.5mg all other days (is what patient was taking prior to 4/18 as well) and to re-check INR Monday 4/23  5. Patient filled dilaudid prescription from 4/18 but has only taken 1 dose, prefers to avoid unless really needed    Prior to Admission medications    Medication Sig Last Dose Taking? Auth Provider   gabapentin (NEURONTIN) 300 MG capsule Take 2 tablest (600 mg) every night 4/19/2018 at pm Yes Katie Gross MD   glipiZIDE (GLIPIZIDE XL) 2.5 MG 24 hr tablet Take 1 tablet (2.5 mg) by mouth every morning 4/19/2018 at am Yes Katie Gross MD   HYDROmorphone (DILAUDID) 2 MG tablet Take 1 tablet (2 mg) by mouth every 3 hours as needed for moderate to severe pain 4/19/2018 at pm Yes Deb Pichardo PA-C   lisinopril (PRINIVIL/ZESTRIL) 10 MG tablet Take 1  tablet (10 mg) by mouth daily 4/19/2018 at am Yes Katie Gross MD   metFORMIN (GLUCOPHAGE-XR) 500 MG 24 hr tablet Take 2 tablets (1,000 mg) by mouth 2 times daily (with meals) 4/19/2018 at am Yes Katie Gross MD   metoprolol succinate (TOPROL-XL) 50 MG 24 hr tablet Take 1 tablet (50 mg) by mouth 2 times daily 4/19/2018 at pm Yes Katie Gross MD   multivitamin, therapeutic with minerals (THERA-VIT-M) TABS Take 1 tablet by mouth daily. 4/19/2018 at am Yes John Son MD   tamsulosin (FLOMAX) 0.4 MG capsule Take 1 capsule (0.4 mg) by mouth daily 4/19/2018 at am Yes Katie Gross MD   WARFARIN SODIUM PO Take 2.5 mg by mouth Sun, Mon, Wed, Thurs, Fri 4/16/2018 at pm Yes Reported, Patient   WARFARIN SODIUM PO Take 1.25 mg by mouth Tues, Sat 4/19/2018 at pm Yes Reported, Patient   ASPIRIN NOT PRESCRIBED (INTENTIONAL) Antiplatelet medication not prescribed intentionally due to Current anticoagulant therapy (warfarin/enoxaparin)   Adriana Pickens MD       Medication history completed by: Felipa Talley, Fourth Year Pharmacy Student

## 2018-04-20 NOTE — IP AVS SNAPSHOT
MRN:[id number]                      After Visit Summary   4/20/2018    Antoine Russo    MRN: [id number]           Thank you!     Thank you for choosing [facility] for your care. Our goal is always to provide you with excellent care. Hearing back from our patients is one way we can continue to improve our services. Please take a few minutes to complete the written survey that you may receive in the mail after you visit with us. Thank you!        Patient Information     Date Of Birth          1945        Designated Caregiver       Most Recent Value    Caregiver    Will someone help with your care after discharge? no      About your hospital stay     You were admitted on:  April 20, 2018 You last received care in the:  [address] [facility]    You were discharged on:  April 21, 2018        Reason for your hospital stay       You had some increased abdominal pain that was not resolved after taking 1 of your dilaudid pills (pain was 8/10).  In our ED your labs were ok.  Your lipase was improving but still mildly elevated at 550.  Your pain is now 5/10 and you are eating ok.  You are likely to have some pain on and off over the next week.  The pain should slowly get better.                  Who to Call     For medical emergencies, please call 911.  For non-urgent questions about your medical care, please call your primary care provider or clinic, [phone]          Attending Provider     Provider Specialty    Sravani Dangelo MD Emergency Medicine    Teo Wright MD Internal Medicine    Kathy, Waleska Powell MD Internal Medicine       Primary Care Provider Office Phone # Fax #    Katie Gross [phone]       When to contact your care team       Contact us or call the GI clinic if your pain becomes more than 8/10 and is not resolved or improved with a couple of doses of dilaudid.  Also call if you start having vomiting or fevers over 101.5F.                   After Care Instructions     Activity       Your activity upon discharge: activity as tolerated but no heavy lifting for the next 7 days (over 20 pounds)            Diet       Follow this diet upon discharge: Orders Placed This Encounter      Snacks/Supplements Adult: Other - Please comment; Boost Plus; Between Meals      Low Fat Diet                  Follow-up Appointments     Follow Up and recommended labs and tests       Follow up with your Coumadin Clinic as planned on the 23rd.  Also please make an appointment with your Primary Care Provider (if you do not already have one) to be seen within the next week or so.  You have an appointment with Dr. Beasley in our GI clinic on May 7th with a CT scan that day (the CT scan is at 8:20am and the appointment is at 11am).                  Your next 10 appointments already scheduled     Apr 23, 2018  8:30 AM CDT   Anticoagulation Visit with NB ANTI BROCK   [facility]    [address] [place] [address]   [phone]            Apr 24, 2018  8:30 AM CDT   Consult HOD with Virginia Hubbard RD   [facility] ([facility])    [address] [facility] [place] [address]   [phone]            Apr 26, 2018  9:45 AM CDT   Anticoagulation Visit with NB ANTI COAG   [facility] ([facility])    [address] [place] [address]   [phone]            May 07, 2018  8:20 AM CDT   (Arrive by 8:05 AM)   CT ABDOMEN PELVIS W/O & W CONTRAST with UCCT2   [facility] ([facility])    [address] [address] [phone]           Please bring any scans or X-rays taken at other hospitals, if similar tests were done. Also bring a list of your medicines, including vitamins, minerals and over-the-counter drugs. It is safest to leave personal items at home.  Be sure to  tell your doctor:   If you have any allergies.   If there s any chance you are pregnant.   If you are breastfeeding.  How to prepare:   Do not eat or drink for 2 hours before your exam. If you need to take medicine, you may take it with small sips of water. (We may ask you to take liquid medicine as well.)   Please wear loose clothing, such as a sweat suit or jogging clothes. Avoid snaps, zippers and other metal. We may ask you to undress and put on a hospital gown.  Please arrive 30 minutes early for your CT. Once in the department you might be asked to drink water 15-20 minutes prior to your exam.  If indicated you may be asked to drink an oral contrast in advance of your CT.  If this is the case, the imaging team will let you know or be in contact with you prior to your appointment  Patients over 70 or patients with diabetes or kidney problems:   If you haven t had a blood test (creatinine test) within the last 30 days, the Cardiologist/Radiologist may require you to get this test prior to your exam.  If you have diabetes:   Continue to take your metformin medication on the day of your exam  If you have any questions, please call the Imaging Department where you will have your exam.            May 07, 2018 11:00 AM CDT   (Arrive by 10:45 AM)   Return Visit with Yovanny Beasley MD   [facility] ([facility])    [address] [place] [address] [phone]              Warfarin Instruction     You have started taking a medicine called warfarin. This is a blood-thinning medicine (anticoagulant). It helps prevent and treat blood clots.      Before leaving the hospital, make sure you know how much to take and how long to take it.      You will need regular blood tests to make sure your blood is clotting safely. It is very important to see your doctor for regular blood tests.    Talk to your doctor before taking any new medicine (this includes  "over-the-counter drugs and herbal products). Many medicines can interact with warfarin. This may cause more bleeding or too much clotting.     Eating a lot of vitamin K--found in green, leafy vegetables--can change the way warfarin works in your body. Do NOT avoid these foods. Instead, try to eat the same amount each day.     Bleeding is the most common side-effect of warfarin. You may notice bleeding gums, a bloody nose, bruises and bleeding longer when you cut yourself. See a doctor at once if:   o You cough up blood  o You find blood in your stool (poop)  o You have a deep cut, or a cut that bleeds longer than 10 minutes   o You have a bad cut, hard fall, accident or hit your head (go to urgent care or the emergency room).    For women who can get pregnant: This medicine can harm an unborn baby. Be very careful not to get pregnant while taking this medicine. If you think you might be pregnant, call your doctor right away.    For more information, read \"Guide to Warfarin Therapy,  the booklet you received in the hospital.        Pending Results     Date and Time Order Name Status Description    4/20/2018 1059 Vitamin E In process     4/20/2018 1059 Vitamin K In process     4/20/2018 1059 Vitamin A In process     4/20/2018 0217 Blood culture Preliminary     4/20/2018 0217 Blood culture Preliminary             Statement of Approval     Ordered          04/21/18 1005  I have reviewed and agree with all the recommendations and orders detailed in this document.  EFFECTIVE NOW     Approved and electronically signed by:  Ran Perdomo PA-C             Admission Information     Date & Time Provider Department Dept. Phone    4/20/2018 Waleska Chavez MD [address] [facility] [phone]      Your Vitals Were     Blood Pressure Pulse Temperature Respirations Pulse Oximetry       100/60 (BP Location: Right arm) 138 99.3  F (37.4  C) (Oral) 18 91%       [facility] Information     [employer] gives you secure access to " your electronic health record. If you see a primary care provider, you can also send messages to your care team and make appointments. If you have questions, please call your primary care clinic.  If you do not have a primary care provider, please call [phone] and they will assist you.        Care EveryWhere ID     This is your Care EveryWhere ID. This could be used by other organizations to access your [facility] medical records  [id number]        Equal Access to Services     ABBEY WALTON : Hadbrian burns Sotomy, wazoraidada lujean-claudeadaha, qaybta kaalmada adekimo, koffi fay. So [facility] [place] [facility] [phone].    ATENCIÓN: Si habla espmisty, tiene a snyder disposición servicios gratuitos de asistencia lingüística. Llame al [phone].    We comply with applicable federal civil rights laws and [place] laws. We do not discriminate on the basis of race, color, national origin, age, disability, sex, sexual orientation, or gender identity.               Review of your medicines      CONTINUE these medicines which have NOT CHANGED        Dose / Directions    ASPIRIN NOT PRESCRIBED   Commonly known as:  INTENTIONAL        Antiplatelet medication not prescribed intentionally due to Current anticoagulant therapy (warfarin/enoxaparin)   Refills:  0       gabapentin 300 MG capsule   Commonly known as:  NEURONTIN   Used for:  Type 2 diabetes mellitus with diabetic polyneuropathy, without long-term current use of insulin (H)        Take 2 tablest (600 mg) every night   Quantity:  180 capsule   Refills:  1       glipiZIDE 2.5 MG 24 hr tablet   Commonly known as:  glipiZIDE XL   Used for:  Type 2 diabetes mellitus with diabetic polyneuropathy, without long-term current use of insulin (H)        Dose:  2.5 mg   Take 1 tablet (2.5 mg) by mouth every morning   Quantity:  90 tablet   Refills:  3       HYDROmorphone 2 MG tablet   Commonly known as:  DILAUDID   Used for:  Acute recurrent pancreatitis        Dose:   2 mg   Take 1 tablet (2 mg) by mouth every 3 hours as needed for moderate to severe pain   Quantity:  10 tablet   Refills:  0       lisinopril 10 MG tablet   Commonly known as:  PRINIVIL/ZESTRIL   Used for:  Persistent proteinuria        Dose:  10 mg   Take 1 tablet (10 mg) by mouth daily   Quantity:  90 tablet   Refills:  1       metFORMIN 500 MG 24 hr tablet   Commonly known as:  GLUCOPHAGE-XR   Used for:  Type 2 diabetes mellitus with diabetic polyneuropathy, without long-term current use of insulin (H)        Dose:  1000 mg   Take 2 tablets (1,000 mg) by mouth 2 times daily (with meals)   Quantity:  360 tablet   Refills:  1       metoprolol succinate 50 MG 24 hr tablet   Commonly known as:  TOPROL-XL   Used for:  Hypertension, benign essential, goal below 140/90        Dose:  50 mg   Take 1 tablet (50 mg) by mouth 2 times daily   Quantity:  180 tablet   Refills:  3       multivitamin, therapeutic with minerals Tabs tablet   Used for:  Surgery aftercare        Dose:  1 tablet   Take 1 tablet by mouth daily.   Quantity:  30 each   Refills:  1       tamsulosin 0.4 MG capsule   Commonly known as:  FLOMAX   Used for:  Urgency of urination, Hypertrophy of prostate without urinary obstruction        Dose:  0.4 mg   Take 1 capsule (0.4 mg) by mouth daily   Quantity:  90 capsule   Refills:  3       * WARFARIN SODIUM PO   Indication:  Obstructing Blood Clot with Chronic Atrial Fibrillation        Dose:  2.5 mg   Take 2.5 mg by mouth Sun, Mon, Wed, Thurs, Fri   Refills:  0       * WARFARIN SODIUM PO   Indication:  Obstructing Blood Clot with Chronic Atrial Fibrillation        Dose:  1.25 mg   Take 1.25 mg by mouth Tues, Sat   Refills:  0       * Notice:  This list has 2 medication(s) that are the same as other medications prescribed for you. Read the directions carefully, and ask your doctor or other care provider to review them with you.         Where to get your medicines      Some of these will need a paper prescription  and others can be bought over the counter. Ask your nurse if you have questions.     Bring a paper prescription for each of these medications     HYDROmorphone 2 MG tablet                Protect others around you: Learn how to safely use, store and throw away your medicines at www.disposemymeds.org.        Information about OPIOIDS     PRESCRIPTION OPIOIDS: WHAT YOU NEED TO KNOW   You have a prescription for an opioid (narcotic) pain medicine. Opioids can cause addiction. If you have a history of chemical dependency of any type, you are at a higher risk of becoming addicted to opioids. Only take this medicine after all other options have been tried. Take it for as short a time and as few doses as possible.     Do not:    Drive. If you drive while taking these medicines, you could be arrested for driving under the influence (DUI).    Operate heavy machinery    Do any other dangerous activities while taking these medicines.     Drink any alcohol while taking these medicines.      Take with any other medicines that contain acetaminophen. Read all labels carefully. Look for the word  acetaminophen  or  Tylenol.  Ask your pharmacist if you have questions or are unsure.    Store your pills in a secure place, locked if possible. We will not replace any lost or stolen medicine. If you don t finish your medicine, please throw away (dispose) as directed by your pharmacist. The [place] Pollution Control Agency has more information about safe disposal: https://www.pca.[facility].mn.us/living-green/managing-unwanted-medications    All opioids tend to cause constipation. Drink plenty of water and eat foods that have a lot of fiber, such as fruits, vegetables, prune juice, apple juice and high-fiber cereal. Take a laxative (Miralax, milk of magnesia, Colace, Senna) if you don t move your bowels at least every other day.              Medication List: This is a list of all your medications and when to take them. Check marks below  indicate your daily home schedule. Keep this list as a reference.      Medications           Morning Afternoon Evening Bedtime As Needed    ASPIRIN NOT PRESCRIBED   Commonly known as:  INTENTIONAL   Antiplatelet medication not prescribed intentionally due to Current anticoagulant therapy (warfarin/enoxaparin)                                gabapentin 300 MG capsule   Commonly known as:  NEURONTIN   Take 2 tablest (600 mg) every night   Last time this was given:  600 mg on 4/20/2018  9:32 PM                                glipiZIDE 2.5 MG 24 hr tablet   Commonly known as:  glipiZIDE XL   Take 1 tablet (2.5 mg) by mouth every morning                                HYDROmorphone 2 MG tablet   Commonly known as:  DILAUDID   Take 1 tablet (2 mg) by mouth every 3 hours as needed for moderate to severe pain   Last time this was given:  2 mg on 4/21/2018  4:05 AM                                lisinopril 10 MG tablet   Commonly known as:  PRINIVIL/ZESTRIL   Take 1 tablet (10 mg) by mouth daily   Last time this was given:  10 mg on 4/21/2018  7:58 AM                                metFORMIN 500 MG 24 hr tablet   Commonly known as:  GLUCOPHAGE-XR   Take 2 tablets (1,000 mg) by mouth 2 times daily (with meals)                                metoprolol succinate 50 MG 24 hr tablet   Commonly known as:  TOPROL-XL   Take 1 tablet (50 mg) by mouth 2 times daily   Last time this was given:  50 mg on 4/21/2018  7:58 AM                                multivitamin, therapeutic with minerals Tabs tablet   Take 1 tablet by mouth daily.   Last time this was given:  1 tablet on 4/21/2018  8:01 AM                                tamsulosin 0.4 MG capsule   Commonly known as:  FLOMAX   Take 1 capsule (0.4 mg) by mouth daily   Last time this was given:  0.4 mg on 4/21/2018  7:58 AM                                * WARFARIN SODIUM PO   Take 2.5 mg by mouth Sun, Mon, Wed, Thurs, Fri   Last time this was given:  2.5 mg on 4/20/2018  6:38 PM                                 * WARFARIN SODIUM PO   Take 1.25 mg by mouth Tues, Sat   Last time this was given:  2.5 mg on 4/20/2018  6:38 PM                                * Notice:  This list has 2 medication(s) that are the same as other medications prescribed for you. Read the directions carefully, and ask your doctor or other care provider to review them with you.

## 2018-04-20 NOTE — CONSULTS
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 Patient was admitted 3/29/18-4/5/18 for recurrent necrotizing pancreatitis, had ERCP with pancreatic stent placed on 3/29/18 and developed post ERCP pancreatitis with lipase 25484. He was admitted again 4/16/18-4/18/18 for acute necrotizing pancreatitis with acute peripancreatic necrotic fluid collection and underwent EDG with pancreatic stent removal on 4/17/18.      Procedures:   3/29/18: EUS to look for recurrent IPMN. An anechoic irregular collection was identified in pancreatic body just upstream of distal pancreatectomy resection margin, very small and along the staple line, c/w a recurrent tail leak with surrounding inflammation.        3/29/18: ERCP: one 5 Fr x 3 cm Sofflex plastic stent placed into the pancreatic duct to drain small collections in tail via duct decompression. A 10 mm biliary sphincterotomy performed to open the entire sphincter, no pancreatic sphincterotomy was performed at the time.     4/17/18: EDG for pancreatic stent removal.      Acute necrotizing pancreatitis with acute peripancreatic necrotic fluid collection: initial presentation 3/29/18, likely due to post ERCP. Lipase up to 14534 on 3/31/18, down to 552 now. Able to tolerate some PO diet. Afebrile, but leukocytosis up trending to 21.2 today from 17 on 4/18/18. Residual leukocytosis likely from ongoing inflammation from necrotizing pancreatitis. CT on 4/16/18 showed new evidence of involving necrotizing  pancreatitis, appeared to be acute fluid collection and no walled off necrosis noted. BISAPs score 2 (for SIRS and Age).      Recommendations  - Advance diet as tolerates to low fat diet   - Nutrition consult and start calorie count (patient will need diet education before discharge, he does not know the criteria for low fat diet)   - Keep LR @ 200 cc/hr   - Monitor for fever curve, respiratory status and blood pressure  - Trend LFTs, CBC with diff and BMP. Would like to see WBC trending down before discharge.   - Agree with pain management per primary  - Will arrange for outpatient Infusion clinic for patient. Pain likely exacerbated by dehydration.     Thank you for involving us in this patient's care. Please do not hesitate to contact the GI service with any questions or concerns.     Pt care plan discussed with , GI staff physician.    Melissa Moise      Addendum:     Patient is seen and examined with Melissa Moise (MS 3). Pt is discussed with attending physician Dr. Rawls.     Agree with the assessment/plan as outlined.     Abelardo Carnes  GI fellow  p [phone]    -------------------------------------------------------------------------------------------------------------------          Chief Complaint:   We were asked by Dr. Wright of General Medicine to evaluate this patient with severe abdominal pain concerning for recurrent necrotizing pancreatitis.     History is obtained from the patient and the medical record.          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 Patient  was admitted 3/29/18-4/5/18 for recurrent necrotizing pancreatitis, had ERCP with pancreatic stent placed on 3/29/18 and developed post ERCP pancreatitis with lipase 25258. He was admitted again 4/16/18-4/18/18 for acute necrotizing pancreatitis with acute peripancreatic necrotic fluid collection and underwent EDG with pancreatic stent removal on 4/17/18. His pain was significantly improved after the stent removal and was discharged on 4/18.     He reports increasing abdominal a day after discharge. He ate a bowel of cereal and a can of protein boost in the morning of 4/19 without nausea or vomiting, but started to develop pain in the epigastric region aferward. In the evening of 4/19, the pain was quite unbearable and was not improved by PO dilaudid. He has remained afebrile since discharge.  He denied fever, chills, nausea, vomiting, dysphagia, odynophagia, hematemesis, melena or hematochezia.             Past Medical History:   Reviewed and edited as appropriate  Past Medical History:   Diagnosis Date     C. 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     Noninfectious ileitis      Recurrent pancreatitis (H)             Past Surgical History:   Reviewed and edited as appropriate   Past Surgical History:   Procedure Laterality Date     BREAST SURGERY  2008    right breast mass benign     COLONOSCOPY      multiple polyps removed     COLONOSCOPY  8/24/2011    Procedure:COMBINED COLONOSCOPY, REMOVE TUMOR/POLYP/LESION BY SNARE; Surgeon:MILEY ARBOLEDA; Location:[facility]     COLONOSCOPY  12/17/2012    Procedure: COLONOSCOPY;;  Surgeon: Leon Maurer MD;  Location:  GI     COLONOSCOPY  12/18/2012    Procedure: COLONOSCOPY;;  Surgeon: Leon Maurer MD;  Location:  GI     DENERVATION OF SPERMATIC CORD MICROSURGICAL Left 5/23/2017    Procedure:  DENERVATION OF SPERMATIC CORD MICROSURGICAL;;  Surgeon: Marcio Aggarwal MD;  [facility]     DISSECTION RADICAL NECK BILATERAL  8/2/2012    Procedure: DISSECTION RADICAL NECK BILATERAL;;  Surgeon: Yung Alvares MD;  Location: [facility]     ENDOSCOPIC RETROGRADE CHOLANGIOPANCREATOGRAM N/A 5/10/2016    Procedure: COMBINED ENDOSCOPIC RETROGRADE CHOLANGIOPANCREATOGRAPHY, PLACE TUBE/STENT;  Surgeon: Yovanny Beasley MD;  Location: [facility]     ENDOSCOPIC RETROGRADE CHOLANGIOPANCREATOGRAM N/A 3/29/2018    Procedure: ENDOSCOPIC RETROGRADE CHOLANGIOPANCREATOGRAM;  Endoscopic Retrograde Cholangiopancreatogram, Endoscopic Ultrasound, Biliary Sphincterotomy, Biliary and Pancreatic Stent Placement;  Surgeon: Yovanny Beasley MD;  Location: [facility]     ENDOSCOPIC ULTRASOUND UPPER GASTROINTESTINAL TRACT (GI) N/A 2/3/2016    Procedure: ENDOSCOPIC ULTRASOUND, ESOPHAGOSCOPY / UPPER GASTROINTESTINAL TRACT (GI);  Surgeon: Grabiel Plata MD;  Location: [facility]     ENDOSCOPIC ULTRASOUND UPPER GASTROINTESTINAL TRACT (GI) N/A 3/29/2018    Procedure: ENDOSCOPIC ULTRASOUND, ESOPHAGOSCOPY / UPPER GASTROINTESTINAL TRACT (GI);;  Surgeon: Grabiel Plata MD;  [facility]: [facility]     ESOPHAGOSCOPY, GASTROSCOPY, DUODENOSCOPY (EGD), COMBINED N/A 2/3/2016    Procedure: COMBINED ENDOSCOPIC ULTRASOUND, ESOPHAGOSCOPY, GASTROSCOPY, DUODENOSCOPY (EGD), FINE NEEDLE ASPIRATE/BIOPSY;  Surgeon: Grabiel Plata MD;  Location: [facility]     ESOPHAGOSCOPY, GASTROSCOPY, DUODENOSCOPY (EGD), COMBINED N/A 6/8/2016    Procedure: COMBINED ESOPHAGOSCOPY, GASTROSCOPY, DUODENOSCOPY (EGD), REMOVE FOREIGN BODY;  Surgeon: Yovanny Beasley MD;  Location: [facility]     ESOPHAGOSCOPY, GASTROSCOPY, DUODENOSCOPY (EGD), COMBINED N/A 4/17/2018    Procedure: COMBINED ESOPHAGOSCOPY, GASTROSCOPY, DUODENOSCOPY (EGD), REMOVE FOREIGN BODY;  EGD with stent removal;  Surgeon: Grabiel Plata MD;  Location: [facility]     EXCISE LESION INTRAORAL  6/14/2012     Procedure: EXCISE LESION INTRAORAL;  Wide Local Excision Floor of Mouth, Direct Laryngoscopy, Bilateral Ida's Marsuplization, Split Thickness Skin Graft from right Thigh  Latex Safe;  Surgeon: Gerson Ravi MD;  [facility]     EXCISE LESION INTRAORAL  8/2/2012    Procedure: EXCISE LESION INTRAORAL;  Floor of Mouth Resection, Bilateral Selective Radical Neck Dissection, Tracheostomy, Left Radial Forearm  Free Flap with Alloderm, Nasogastric Feeding Tube Placement,    * Latex Safe*;  Surgeon: Gerson Ravi MD;  [facility]     EXCISE LESION INTRAORAL  12/11/2012    Procedure: EXCISE LESION INTRAORAL;  takedown of oral flap;  Surgeon: Yung Alvares MD;  [facility]     GRAFT FREE VASCULARIZED (LOCATION)  8/2/2012    Procedure: GRAFT FREE VASCULARIZED (LOCATION);;  Surgeon: Yung Alvares MD;  [facility]     GRAFT SKIN SPLIT THICKNESS FROM EXTREMITY  6/14/2012    Procedure: GRAFT SKIN SPLIT THICKNESS FROM EXTREMITY;;  Surgeon: Gerson Ravi MD;  [facility]     LAPAROSCOPIC ILEOSTOMY TAKEDOWN  6/6/2013    Procedure: LAPAROSCOPIC ILEOSTOMY TAKEDOWN;  Laparoscopic Closure of Enterostomy, Guerda's Type with IleoRectal Anastomosis ;  Surgeon: Grabiel Riddle MD;  [facility]     LAPAROTOMY EXPLORATORY  12/20/2012    Procedure: LAPAROTOMY EXPLORATORY;  Exploratory Laparotomy, total abdominal colectomy, ileostomy formation;  Surgeon: Miquel Cannon MD;  [facility]     LARYNGOSCOPY  6/14/2012    Procedure: LARYNGOSCOPY;;  Surgeon: Gerson Ravi MD;  [facility]     ORTHOPEDIC SURGERY      ganglian cyst left ankle     PANCREATECTOMY, SPLENECTOMY N/A 3/10/2016    Procedure: PANCREATECTOMY, SPLENECTOMY;  Surgeon: Nael Abel MD;  Location: [facility]     SHOULDER SURGERY  2006, 2008    2006- right rotator cuff, 2008 bone spur on left. Dr. Hdez     VARICOCELECTOMY Left 5/23/2017    Procedure: VARICOCELECTOMY;  Left Varicocele Repair, Denervation of Left Testis;  Surgeon:  Marcio Aggarwal MD;  Location:  OR            Social History:     Social History Main Topics     Smoking status: Former Smoker     Packs/day: 1.00     Years: 40.00     Types: Cigarettes     Quit date: 2006     Smokeless tobacco: Never Used     Alcohol use No     Drug use: No     Sexual activity: Yes     Partners: Female            Family History:   Reviewed and edited as appropriate  Family History   Problem Relation Age of Onset     DIABETES Sister      onset age 50     Alzheimer Disease Mother       80     Alzheimer Disease Father       85     DIABETES Other      nephew type 1     DIABETES Other      Anesthesia Reaction No family hx of      Colon Cancer No family hx of      Colon Polyps No family hx of      Crohn Disease No family hx of      Ulcerative Colitis No family hx of      No known history of gastrointestinal/liver disease or  gastrointestinal malignancies.         Allergies:   Reviewed and edited as appropriate     Allergies   Allergen Reactions     Nkda [No Known Drug Allergies]             Medications:     Prescriptions Prior to Admission   Medication Sig Dispense Refill Last Dose     gabapentin (NEURONTIN) 300 MG capsule Take 2 tablest (600 mg) every night 180 capsule 1 2018 at pm     glipiZIDE (GLIPIZIDE XL) 2.5 MG 24 hr tablet Take 1 tablet (2.5 mg) by mouth every morning 90 tablet 3 2018 at am     HYDROmorphone (DILAUDID) 2 MG tablet Take 1 tablet (2 mg) by mouth every 3 hours as needed for moderate to severe pain 10 tablet 0 2018 at pm     lisinopril (PRINIVIL/ZESTRIL) 10 MG tablet Take 1 tablet (10 mg) by mouth daily 90 tablet 1 2018 at am     metFORMIN (GLUCOPHAGE-XR) 500 MG 24 hr tablet Take 2 tablets (1,000 mg) by mouth 2 times daily (with meals) 360 tablet 1 2018 at am     metoprolol succinate (TOPROL-XL) 50 MG 24 hr tablet Take 1 tablet (50 mg) by mouth 2 times daily 180 tablet 3 2018 at pm     multivitamin, therapeutic with minerals  (THERA-VIT-M) TABS Take 1 tablet by mouth daily. 30 each 1 4/19/2018 at am     tamsulosin (FLOMAX) 0.4 MG capsule Take 1 capsule (0.4 mg) by mouth daily 90 capsule 3 4/19/2018 at am     WARFARIN SODIUM PO Take 2.5 mg by mouth Sun, Mon, Wed, Thurs, Fri 4/16/2018 at pm     WARFARIN SODIUM PO Take 1.25 mg by mouth Tues, Sat   4/19/2018 at pm     ASPIRIN NOT PRESCRIBED (INTENTIONAL) Antiplatelet medication not prescribed intentionally due to Current anticoagulant therapy (warfarin/enoxaparin)   Taking             Review of Systems:   A complete review of systems was performed and is negative except as noted in the HPI           Physical Exam:   /62 (BP Location: Right arm)  Pulse 138  Temp 99.1  F (37.3  C) (Oral)  Resp 14  SpO2 96%  Wt:   Wt Readings from Last 2 Encounters:   04/19/18 185 lb (83.9 kg)   04/17/18 183 lb 6.4 oz (83.2 kg)      Constitutional: cooperative, pleasant, not dyspneic/diaphoretic, no acute distress  Eyes: Sclera anicteric/injected  Ears/nose/mouth/throat: mucus membranes dry, hearing intact  CV: No edema  Respiratory: Unlabored breathing  Lymph: No axillary, submandibular, supraclavicular or inguinal lymphadenopathy  Abd: Nondistended, +bs, mild tenderness in epigastric region, no peritoneal signs  Skin: warm, perfused, no jaundice  Neuro: AAO x 3  Psych: Normal affect  MSK: No gross deformities         Data:   Labs and imaging below were independently reviewed and interpreted    BMP    Recent Labs  Lab 04/20/18  0145 04/18/18  0450 04/17/18 0426 04/16/18  1040    137 138 135   POTASSIUM 4.3 4.3 4.6 5.2   CHLORIDE 99 100 104 102   NILSON 9.1 8.8 8.8 10.1   CO2 29 28 27 23   BUN 8 6* 9 16   CR 0.92 0.89 0.90 1.08   * 220* 150* 216*     CBC    Recent Labs  Lab 04/20/18  0145 04/18/18  0040 04/17/18  0426 04/16/18  1040   WBC 21.2* 17.0* 15.4* 18.5*   RBC 3.50* 3.81* 3.65* 4.18*   HGB 11.1* 12.1* 11.3* 13.3   HCT 34.7* 38.3* 36.8* 41.5   MCV 99 101* 101* 99   MCH 31.7 31.8  31.0 31.8   MCHC 32.0 31.6 30.7* 32.0   RDW 15.3* 15.4* 15.5* 15.2*    532* 470* 624*     INR    Recent Labs  Lab 04/20/18  0145 04/18/18  1021 04/17/18  0426 04/16/18  1040   INR 1.86* 3.43* 3.23* 2.59*     LFTs    Recent Labs  Lab 04/20/18 0145 04/17/18  0426 04/16/18  1040   ALKPHOS 72 66 84   AST 7 10 17   ALT 10 12 16   BILITOTAL 0.7 0.5 0.5   PROTTOTAL 6.8 5.8* 7.4   ALBUMIN 2.2* 2.0* 2.7*      PANC    Recent Labs  Lab 04/20/18 0145 04/16/18  1040   LIPASE 552* 1375*

## 2018-04-20 NOTE — H&P
[facility]    Internal Medicine History and Physical - Gold Service       Date of Admission:  4/20/2018    Assessment & Plan   Antoine Russo is a 72 year old male PMH of  recurrent pancreatitis and IPMN (s/p distal pancreatectomy c/b pancreatic leak in 2016) s/p ERCP with biliary sphincterotomy and stent placement (CBD and PD to decompress leak) 3/29/2017, A fib with RVR, HTN, DMII, peripheral neuropathy, and BPH  Was discharged yesterday and represented with increasing abdominal pain admitted for close monitoring of necrotizing pancreatitis      #. Necrotizing Pancreatitis: H/o IPMN (s/p distal pancreatectomy c/b pancreatic leak in 2016) s/p ERCP with sphincterotomy and stent placement (CBD and PD to decompress leak) 3/29/2017.  Recent CT scan from 4/16/2018 shows evidence of necrotic pancreatic tissue involving the head.  GI was consulted in the emergency room and wanted to observe patient.  No repeat CT or antibiotic was recommended.  Increasing leukocytosis no fever.  Mild focal right upper quadrant tenderness no rebound or guarding on exam    - GI will follow the patient  - NPO, LR at 150 cc/hr  -We will follow inflammatory markers and cultures no antibiotics for now  -Nutrition consult     Chronic a fib: PTA on metoprolol,  - Coumadin on hold in case ERCP needed      DMII: A1c 7.7 3/2018. PTA o glipizide.   - MSSI, hypoglycemia protocol      HTN, CAD: PTA on metoprolol, lisinopril. Stress test 2009 inferior wall ischemia. Cath with moderate CAD with stenosis of 40-50% in LAD and RCA. No stents. Echo 1/2018 (done while in Afib with rates of 100-110); EF of 55-60%.    - Continue PTA meds with hold parameters        Other Chronic Medical Issues:  BPH: Continue PTA flomax  Peripheral neuropathy: Continue PTA gabapentin  H/o c diff s/p fecal transplant: Tx 2012. S/p colectomy and ileoanal anastomosis  H/O malignant neoplasm of mouth: SCC s/p transcervical glossectomy and  floor of mouth excision with bilateral neck dissections 8/2012. S/p forearm free flap reconstruction. No radiation      Diet: NPO   Fluids: LR@150ml/hr for 1l  DVT Prophylaxis: Pneumatic Compression Devices  Code Status: FULL     Disposition Plan   Expected discharge: 2 - 3 days; recommended to prior living arrangement     Teo Wright MD  Hospitalist/abhilash  [facility]    Departments of Medicine   Pager: [phone]    ______________________________________________________________________    Chief Complaint   Abdominal pian     History of Present Illness   Antoine Russo is a 72 year old male PMH of  recurrent pancreatitis and IPMN (s/p distal pancreatectomy c/b pancreatic leak in 2016) s/p ERCP with biliary sphincterotomy and stent placement (CBD and PD to decompress leak) 3/29/2017, A fib with RVR, HTN, DMII, peripheral neuropathy, and BPH  Was discharged yesterday and represented with increasing abdominal pain.  Was discharged yesterday with a plan follow-up with gastroenterology and nutrition can follow-up.  Patient reports increasing abdominal pain after the reached home.  Pain was not responsive to his p.o. Dilaudid so represented to the ED. denies any fever.  Normal bowel movement no diarrhea or constipation    ED course: Gideon was contacted and recommended no repeat CT. wanted to follow-up the patient.      Review of Systems   The 10 point Review of Systems is negative other than noted in the HPI or here.     Past Medical History    I have reviewed this patient's medical history and updated it with pertinent information if needed.   Past Medical History:   Diagnosis Date     C. 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     Noninfectious ileitis      Recurrent pancreatitis (H)        Past Surgical  History   I have reviewed this patient's surgical history and updated it with pertinent information if needed.  Past Surgical History:   Procedure Laterality Date     BREAST SURGERY  2008    right breast mass benign     COLONOSCOPY      multiple polyps removed     COLONOSCOPY  8/24/2011    Procedure:COMBINED COLONOSCOPY, REMOVE TUMOR/POLYP/LESION BY SNARE; Surgeon:MILEY ARBOLEDA; Location:[facility]     COLONOSCOPY  12/17/2012    Procedure: COLONOSCOPY;;  Surgeon: Leon Maurer MD;  Location: [facility]     COLONOSCOPY  12/18/2012    Procedure: COLONOSCOPY;;  Surgeon: Leon Maurer MD;  [facility]     DENERVATION OF SPERMATIC CORD MICROSURGICAL Left 5/23/2017    Procedure: DENERVATION OF SPERMATIC CORD MICROSURGICAL;;  Surgeon: Marcio Aggarwal MD;  [facility]     DISSECTION RADICAL NECK BILATERAL  8/2/2012    Procedure: DISSECTION RADICAL NECK BILATERAL;;  Surgeon: Yung Alvares MD;  [facility]     ENDOSCOPIC RETROGRADE CHOLANGIOPANCREATOGRAM N/A 5/10/2016    Procedure: COMBINED ENDOSCOPIC RETROGRADE CHOLANGIOPANCREATOGRAPHY, PLACE TUBE/STENT;  Surgeon: Yovanny Beasley MD;  [facility]     ENDOSCOPIC RETROGRADE CHOLANGIOPANCREATOGRAM N/A 3/29/2018    Procedure: ENDOSCOPIC RETROGRADE CHOLANGIOPANCREATOGRAM;  Endoscopic Retrograde Cholangiopancreatogram, Endoscopic Ultrasound, Biliary Sphincterotomy, Biliary and Pancreatic Stent Placement;  Surgeon: Yovanny Beasley MD;  [facility]     ENDOSCOPIC ULTRASOUND UPPER GASTROINTESTINAL TRACT (GI) N/A 2/3/2016    Procedure: ENDOSCOPIC ULTRASOUND, ESOPHAGOSCOPY / UPPER GASTROINTESTINAL TRACT (GI);  Surgeon: Grabiel Plata MD;  [facility]     ENDOSCOPIC ULTRASOUND UPPER GASTROINTESTINAL TRACT (GI) N/A 3/29/2018    Procedure: ENDOSCOPIC ULTRASOUND, ESOPHAGOSCOPY / UPPER GASTROINTESTINAL TRACT (GI);;  Surgeon: Grabiel Plata MD;  [facility]     ESOPHAGOSCOPY, GASTROSCOPY, DUODENOSCOPY (EGD), COMBINED  N/A 2/3/2016    Procedure: COMBINED ENDOSCOPIC ULTRASOUND, ESOPHAGOSCOPY, GASTROSCOPY, DUODENOSCOPY (EGD), FINE NEEDLE ASPIRATE/BIOPSY;  Surgeon: Grabiel Plata MD;  [facility]     ESOPHAGOSCOPY, GASTROSCOPY, DUODENOSCOPY (EGD), COMBINED N/A 6/8/2016    Procedure: COMBINED ESOPHAGOSCOPY, GASTROSCOPY, DUODENOSCOPY (EGD), REMOVE FOREIGN BODY;  Surgeon: Yovanny Beasley MD;  [facility]     ESOPHAGOSCOPY, GASTROSCOPY, DUODENOSCOPY (EGD), COMBINED N/A 4/17/2018    Procedure: COMBINED ESOPHAGOSCOPY, GASTROSCOPY, DUODENOSCOPY (EGD), REMOVE FOREIGN BODY;  EGD with stent removal;  Surgeon: Grabiel Plata MD;  [facility]     EXCISE LESION INTRAORAL  6/14/2012    Procedure: EXCISE LESION INTRAORAL;  Wide Local Excision Floor of Mouth, Direct Laryngoscopy, Bilateral Tipton's Marsuplization, Split Thickness Skin Graft from right Thigh  Latex Safe;  Surgeon: Gerson Ravi MD;  [facility]     EXCISE LESION INTRAORAL  8/2/2012    Procedure: EXCISE LESION INTRAORAL;  Floor of Mouth Resection, Bilateral Selective Radical Neck Dissection, Tracheostomy, Left Radial Forearm  Free Flap with Alloderm, Nasogastric Feeding Tube Placement,    * Latex Safe*;  Surgeon: Gerson Ravi MD;  [facility]     EXCISE LESION INTRAORAL  12/11/2012    Procedure: EXCISE LESION INTRAORAL;  takedown of oral flap;  Surgeon: Yung Alvares MD;  [facility]     GRAFT FREE VASCULARIZED (LOCATION)  8/2/2012    Procedure: GRAFT FREE VASCULARIZED (LOCATION);;  Surgeon: Yung Alvares MD;  [facility]     GRAFT SKIN SPLIT THICKNESS FROM EXTREMITY  6/14/2012    Procedure: GRAFT SKIN SPLIT THICKNESS FROM EXTREMITY;;  Surgeon: Gerson Ravi MD;  [facility]     LAPAROSCOPIC ILEOSTOMY TAKEDOWN  6/6/2013    Procedure: LAPAROSCOPIC ILEOSTOMY TAKEDOWN;  Laparoscopic Closure of Enterostomy, Guerda's Type with IleoRectal Anastomosis ;  Surgeon: Grabiel Riddle MD;  [facility]     LAPAROTOMY  EXPLORATORY  2012    Procedure: LAPAROTOMY EXPLORATORY;  Exploratory Laparotomy, total abdominal colectomy, ileostomy formation;  Surgeon: Miquel Cannon MD;  [facility]     LARYNGOSCOPY  2012    Procedure: LARYNGOSCOPY;;  Surgeon: Gerson Ravi MD;  [facility]     ORTHOPEDIC SURGERY      ganglian cyst left ankle     PANCREATECTOMY, SPLENECTOMY N/A 3/10/2016    Procedure: PANCREATECTOMY, SPLENECTOMY;  Surgeon: Nael Abel MD;  [facility]     SHOULDER SURGERY  ,     2006- right rotator cuff,  bone spur on left. Dr. Hdez     VARICOCELECTOMY Left 2017    Procedure: VARICOCELECTOMY;  Left Varicocele Repair, Denervation of Left Testis;  Surgeon: Marcio Aggarwal MD;  Location:  OR       Social History   Social History   Substance Use Topics     Smoking status: Former Smoker     Packs/day: 1.00     Years: 40.00     Types: Cigarettes     Quit date: 2006     Smokeless tobacco: Never Used     Alcohol use No       Family History   I have reviewed this patient's family history and updated it with pertinent information if needed.   Family History   Problem Relation Age of Onset     DIABETES Sister      onset age 50     Alzheimer Disease Mother       80     Alzheimer Disease Father       85     DIABETES Other      nephew type 1     DIABETES Other      Anesthesia Reaction No family hx of      Colon Cancer No family hx of      Colon Polyps No family hx of      Crohn Disease No family hx of      Ulcerative Colitis No family hx of        Prior to Admission Medications   Prior to Admission Medications   Prescriptions Last Dose Informant Patient Reported? Taking?   ASPIRIN NOT PRESCRIBED (INTENTIONAL)   Yes No   Sig: Antiplatelet medication not prescribed intentionally due to Current anticoagulant therapy (warfarin/enoxaparin)   HYDROmorphone (DILAUDID) 2 MG tablet   No No   Sig: Take 1 tablet (2 mg) by mouth every 3 hours as needed for moderate to severe pain    gabapentin (NEURONTIN) 300 MG capsule   No No   Sig: Take 2 tablest (600 mg) every night   glipiZIDE (GLIPIZIDE XL) 2.5 MG 24 hr tablet  Self No No   Sig: Take 1 tablet (2.5 mg) by mouth every morning   lisinopril (PRINIVIL/ZESTRIL) 10 MG tablet  Self No No   Sig: Take 1 tablet (10 mg) by mouth daily   metFORMIN (GLUCOPHAGE-XR) 500 MG 24 hr tablet  Self No No   Sig: Take 2 tablets (1,000 mg) by mouth 2 times daily (with meals)   metoprolol succinate (TOPROL-XL) 50 MG 24 hr tablet   No No   Sig: Take 1 tablet (50 mg) by mouth 2 times daily   multivitamin, therapeutic with minerals (THERA-VIT-M) TABS  Self No No   Sig: Take 1 tablet by mouth daily.   Patient taking differently: Take 1 tablet by mouth every morning    polyethylene glycol (MIRALAX/GLYCOLAX) Packet   No No   Sig: Take 17 g by mouth daily as needed for constipation   tamsulosin (FLOMAX) 0.4 MG capsule  Self No No   Sig: Take 1 capsule (0.4 mg) by mouth daily      Facility-Administered Medications: None     Allergies   Allergies   Allergen Reactions     Nkda [No Known Drug Allergies]        Physical Exam   Vital Signs: Temp: 98.9  F (37.2  C) Temp src: Oral BP: 108/80 Pulse: 138 Heart Rate: 93 Resp: 11 SpO2: 97 % O2 Device: Nasal cannula Oxygen Delivery: 2 LPM  Weight: 0 lbs 0 oz    General Appearance: Pleasant not in distress   HEENT: No jaundice, moist BM   Respiratory: CTAB  Cardiovascular: RRR, s1, s2 no m/g   GI: Soft, mild left lower quadrant tenderness   Genitourinary: No CVAT   Extremities : No Edema   Neurologic: A&Ox3, moves all extremities equally   Data   Data reviewed in EPIC

## 2018-04-20 NOTE — PLAN OF CARE
Problem: Patient Care Overview  Goal: Plan of Care/Patient Progress Review  Outcome: No Change  Pt arrived on unit just before 07:00 AM via [place]ler. No issues or concerns at this time. All valuables other than cell phone sent home with family. Two RN skin assessment done with Ray QUINONES RN with no issues noted. Pt orientated to room and unit routine. VSS. NAD.

## 2018-04-20 NOTE — IP AVS SNAPSHOT
[address] [address]    Phone:  [phone]                                       After Visit Summary   4/20/2018    Antoine Russo    MRN: [id number]           After Visit Summary Signature Page     I have received my discharge instructions, and my questions have been answered. I have discussed any challenges I see with this plan with the nurse or doctor.    ..........................................................................................................................................  Patient/Patient Representative Signature      ..........................................................................................................................................  Patient Representative Print Name and Relationship to Patient    ..................................................               ................................................  Date                                            Time    ..........................................................................................................................................  Reviewed by Signature/Title    ...................................................              ..............................................  Date                                                            Time

## 2018-04-20 NOTE — PROGRESS NOTES
Brief Note:    Discussed plan with GI, ok to advance to low fat diet. No plan for intervention at this time. Will resume Coumadin. GI plans to set patient up with OP infusion for future pain plans. Patient should go to infusion center in the future for IVF and medical management. They will drop note with further recs. They would also like to see WBC falling before discharge, but no indication for abx at this time    Deb Pichardo PA-C  Internal Medicine [facility]  [phone]

## 2018-04-20 NOTE — PHARMACY-ANTICOAGULATION SERVICE
Clinical Pharmacy - Warfarin Dosing Consult     Pharmacy has been consulted to manage this patient s warfarin therapy.  Indication: Atrial Fibrillation  Therapy Goal: INR 2-3  Warfarin PTA Regimen: pt was to hold warfarin on 4/18 and take 1.25 mg on 4/19, 4/20 per clinic.     INR   Date Value Ref Range Status   04/20/2018 1.86 (H) 0.86 - 1.14 Final   04/18/2018 3.43 (H) 0.86 - 1.14 Final       Recommend warfarin 2.5 mg today for subtherapeutic INR. Pharmacy will monitor Antoine Russo daily and order warfarin doses to achieve specified goal.      Please contact pharmacy as soon as possible if the warfarin needs to be held for a procedure or if the warfarin goals change.

## 2018-04-20 NOTE — DISCHARGE SUMMARY
[facility], [place]    Internal Medicine Discharge Summary- Gold Service    Date of Admission:  4/20/2018  Date of Discharge:  4/21/2018 11:00 AM  Discharging Provider: Ran Perdomo PA-C/Waleska Chavez MD  Discharge Team: Gold 3    Discharge Diagnoses    Acute abdominal pain, necrotizing pancreatitis  Non-severe malnutrition  Chronic a fib  DMII  HTN  CAD  BPH  Peripheral neuropathy  Peripheral neuropathy  H/o malignant neoplasm of mouth  QTc prolongation   H/o c diff s/p fecal transplant     Follow-ups Needed After Discharge   - Follow up with PCP within 1 week of discharge  - Follow up INR on Monday 4/23  - Follow up with GI as scheduled 5/7/2018    Hospital Course   Antoine Russo is a 72 year old male with history of necrotizing pancreatitis, IPMN (s/p distal pancreatectomy c/b pancreatic leak in 2016) s/p several ERCP/EGD (most recent EGD 4/17 with PD stent removal, ERCP 3/29), A fib with RVR, HTN, DMII, peripheral neuropathy, and BPH who was admitted 4/20/2018 with acute abdominal pain. Of note, patient just discharged from [facility] with necrotizing pancreatitis 4/18/2018      Acute abdominal pain, necrotizing pancreatitis: H/o IPMN (s/p distal pancreatectomy c/b pancreatic leak in 2016). Repeat ERCPs over the past 4-5 months due to recurrent pancreatitis. CTAP 4/16 new  pancreatic necrosis of pancreatic head/uncinate process, necrotic lymph nodes. S/p EGD 4/17 with PD stent removal and symptomatic relief. Returned to ED 4/20 with acute abdominal pain. Per patient, sober since 1/2018. No etoh level obtained in the ED. Lipase 552 4/20. WBC 17.5 (21.2).   - GI consulted, did not recommend acute intervention and felt acute pain may be 2/2 dehydration  - Vitamins A, D, E, K pending  - Recommend fecal elastase at PCP follow up  - EGT negative  - Future pain care plan includes: additional rx for #10 tabs of dilaudid   - Tolerating low fat diet on discharge    - Patient was given 10 tabs  oral dilaudid on recent discharge. No indication for further opiates       Non-severe nutrition: NJ suggested by GI last admission, patient refused. Prealbumin 7. He tolerated a regular diet prior to discharge.  - Nutrition consulted this admission  - Continuef low fat diet on discharge       Chronic a fib: PTA on metoprolol, coumadin. INR 2.30  - Continued PTA metoprolol and coumadin per d/w pharmacy       DMII: A1c 7.7 3/2018. PTA on Metformin, glipizide which were held this admission. Transitioned to PTA meds prior to discharge       HTN, CAD: PTA on metoprolol, lisinopril. Stress test 2009 inferior wall ischemia. Cath with moderate CAD with stenosis of 40-50% in LAD and RCA. No stents. Echo 1/2018 (done while in Afib with rates of 100-110); EF 55-60%.   - Continued PTA meds with hold parameters this admission     Other Chronic Medical Issues:  BPH: Continued PTA flomax this admission  Peripheral neuropathy: Continued PTA gabapentin this admission  H/o c diff s/p fecal transplant: Tx 2012. 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 S/p forearm free flap reconstruction. No radiation  QTc prolongation: QTc 455 4/16. Avoided prolonging meds    Discharge pain plan: - During his hospitalization, Antoine experienced pain due to acute abdominal pain with necrotizing pancreatitis. The pain plan for discharge was discussed with Antoine and the plan was created in a collaborative fashion. See above    Consultations This Hospital Stay   GI, nutrition, PT, OT    Code Status   Full Code    Time Spent on this Encounter   I, Ran Perdomo, personally saw the patient today and spent greater than 30 minutes discharging this patient.       Ran Perdomo PA-C   Internal Medicine Staff Hospitalist Service  [facility]  Pager:  [phone]  ______________________________________________________________________    Physical Exam   Vital Signs: Temp: 98.2  F (36.8  C) Temp src: Oral BP: 105/63 Pulse: 138 Heart Rate: 84 Resp: 14 SpO2: 94 % O2 Device: None (Room air) Oxygen Delivery: 2 LPM  Weight: 0 lbs 0 oz    General Appearance: Pleasant male in NAD. A&Ox3.  Respiratory: Normal respiratory effort on RA. Lungs clear bilaterally.   Cardiovascular: Irregularly irregular. No murmurs  GI: Abdomen soft, mildly tender in epigastrium. Bowel sounds present.   Skin: No rashes on exposed areas of skin.   Other: No peripheral edema.    Significant Results and Procedures   Most Recent 3 CBC's:  Recent Labs   Lab Test  04/20/18   0145 04/18/18   0040  04/17/18   0426   WBC  21.2*  17.0*  15.4*   HGB  11.1*  12.1*  11.3*   MCV  99  101*  101*   PLT  439  532*  470*     Most Recent 3 BMP's:  Recent Labs   Lab Test  04/20/18   0145 04/18/18   0450  04/17/18   0426   NA  134  137  138   POTASSIUM  4.3  4.3  4.6   CHLORIDE  99  100  104   CO2  29  28  27   BUN  8  6*  9   CR  0.92  0.89  0.90   ANIONGAP  6  9  8   NILSON  9.1  8.8  8.8   GLC  135*  220*  150*     Most Recent 2 LFT's:  Recent Labs   Lab Test  04/20/18   0145 04/17/18   0426   AST  7  10   ALT  10  12   ALKPHOS  72  66   BILITOTAL  0.7  0.5     Most Recent 3 INR's:  Recent Labs   Lab Test  04/20/18   0145 04/18/18   1021  04/17/18   0426   INR  1.86*  3.43*  3.23*       Pending Results   These results will be followed up by PCP/GI  Unresulted Labs Ordered in the Past 30 Days of this Admission     Date and Time Order Name Status Description    4/20/2018 1059 Vitamin E In process     4/20/2018 1059 Vitamin K In process     4/20/2018 1059 Vitamin A In process     4/20/2018 0217 Blood culture Preliminary     4/20/2018 0217 Blood culture Preliminary            Primary Care Physician   Katie Gross    Discharge Disposition   Discharged to home  Condition at discharge: Stable    Discharge Orders      When to contact your care team   Contact us or call the GI clinic if your pain becomes more than 8/10 and is not resolved or improved with a couple of doses of dilaudid.  Also call if you start having vomiting or fevers over 101.5F.     Activity   Your activity upon discharge: activity as tolerated but no heavy lifting for the next 7 days (over 20 pounds)     Follow Up and recommended labs and tests   Follow up with your Coumadin Clinic as planned on the 23rd.  Also please make an appointment with your Primary Care Provider (if you do not already have one) to be seen within the next week or so.  You have an appointment with Dr. Beasley in our GI clinic on May 7th with a CT scan that day (the CT scan is at 8:20am and the appointment is at 11am).     Reason for your hospital stay   You had some increased abdominal pain that was not resolved after taking 1 of your dilaudid pills (pain was 8/10).  In our ED your labs were ok.  Your lipase was improving but still mildly elevated at 550.  Your pain is now 5/10 and you are eating ok.  You are likely to have some pain on and off over the next week.  The pain should slowly get better.     Full Code     Diet   Follow this diet upon discharge: Orders Placed This Encounter     Snacks/Supplements Adult: Other - Please comment; Boost Plus; Between Meals     Low Fat Diet       Discharge Medications   Discharge Medication List as of 4/21/2018 10:44 AM      CONTINUE these medications which have CHANGED    Details   HYDROmorphone (DILAUDID) 2 MG tablet Take 1 tablet (2 mg) by mouth every 3 hours as needed for moderate to severe pain, Disp-10 tablet, R-0, Local Print         CONTINUE these medications which have NOT CHANGED    Details   gabapentin (NEURONTIN) 300 MG capsule Take 2 tablest (600 mg) every night, Disp-180 capsule, R-1, E-Prescribe      glipiZIDE (GLIPIZIDE XL) 2.5 MG 24 hr tablet Take 1 tablet (2.5 mg) by mouth every morning, Disp-90 tablet, R-3, E-Prescribe       lisinopril (PRINIVIL/ZESTRIL) 10 MG tablet Take 1 tablet (10 mg) by mouth daily, Disp-90 tablet, R-1, E-Prescribe      metFORMIN (GLUCOPHAGE-XR) 500 MG 24 hr tablet Take 2 tablets (1,000 mg) by mouth 2 times daily (with meals), Disp-360 tablet, R-1, E-Prescribe      metoprolol succinate (TOPROL-XL) 50 MG 24 hr tablet Take 1 tablet (50 mg) by mouth 2 times daily, Disp-180 tablet, R-3, E-Prescribe      multivitamin, therapeutic with minerals (THERA-VIT-M) TABS Take 1 tablet by mouth daily., Disp-30 each, R-1, E-Prescribe      tamsulosin (FLOMAX) 0.4 MG capsule Take 1 capsule (0.4 mg) by mouth daily, Disp-90 capsule, R-3, E-Prescribe      !! WARFARIN SODIUM PO Take 2.5 mg by mouth Sun, Mon, Wed, Thurs, Fri, Historical      !! WARFARIN SODIUM PO Take 1.25 mg by mouth Tues, Sat, Historical      ASPIRIN NOT PRESCRIBED (INTENTIONAL) Reason ASA was Not Prescribed: Current anticoagulant therapy (warfarin/enoxaparin)       !! - Potential duplicate medications found. Please discuss with provider.      STOP taking these medications       polyethylene glycol (MIRALAX/GLYCOLAX) Packet Comments:   Reason for Stopping:             Allergies   Allergies   Allergen Reactions     Nkda [No Known Drug Allergies]      Physician Attestation   I, Waleska Chavez, personally saw and evaluated Antoine Russo as part of a shared visit.  I have reviewed and discussed with the advanced practice provider their discharge plan.    My key history or physical exam findings from the day of discharge: Patient doing ok, pain down to a 5/10. Eating ok. Drinking ok.  Alert, NAD  RRR  CTA B  Abd: +BS, slightly tender but no rebound    Key management decisions made by me:   Ok to dc to home  Discussed low fat diet  Also discussed that he would likely have some pain over the next week or so and when to call us.  Had only taken 1 tablet of dilaudid at home  Spoke w/GI who was also ok with discharge.    Waleska Chavez  Date of Service (when I saw  the patient): 4/21/18

## 2018-04-20 NOTE — PROGRESS NOTES
Care Coordinator Progress Note    Admission Date/Time:  4/20/2018  Attending MD:  Teo Wright MD    Data  Chart reviewed, discussed with interdisciplinary team.   Patient was admitted for:    Necrotizing pancreatitis  Abdominal pain, epigastric.    Concerns with insurance coverage for discharge needs: None.  Current Living Situation: Patient lives with spouse.  Support System: Supportive and Involved  Services Involved:   Transportation at Discharge: family  Transportation to Medical Appointments:   - Name of caregiver: Wife Blaire  Barriers to Discharge: chronically ill     Assessment  Per chart review and interdisciplinary rounds pt readmitted due to increased abd pain with known nec panc.  No abx at this time.  Pt has been referred to [facility] in the past 2016 and has had referrals to [facility] but has not needed IV abx at time of dc.  Pt lives in [place], [place].  The closest out pt infusion center would be [facility] in [facility].  RNCC will continue to follow for dc needs as in pt stay and medical needs unfold.  Per MD team pt possibly dcing over the weekend     Plan  Anticipated Discharge Date:  TBD, pending pain control and stability.  Anticipated Discharge Plan:  Home with wife.      Michelle Mckeon, FRANCHESCACC, BSN    [facility]    Medicine Group  [address] [place] [address]    [email or web address]  Formerly [email or web address]    Office: [phone] Pager: [phone]

## 2018-04-20 NOTE — TELEPHONE ENCOUNTER
I was paged as the GI fellow on call by Mr Russo. He was just discharged yesterday but is now having unbearable abdominal pain again. He apparently was not pain free at discharge and didn't think he was ready to go home yet. He had his pancreatic duct stent removed 4/17. He can tolerate some food but has no appetite. I stated if his pain is unbearable he will need to come in to the ED for evaluation and pain control. He just had a CT A/P on 4/16, so unless his exam or labs are striking a repeat would be unlikely to add much. May need admission for pain control and consideration of enteral nutrition if unable to get pain under control in ED. Otherwise he could call the clinic tomorrow, although I'm not certain they would be able to do much else. He felt most comfortable with being evaluated in the ED and will be on his way.    Michele Hartmann MD  GI Fellow  [phone]

## 2018-04-20 NOTE — PROGRESS NOTES
[facility], [place]    Internal Medicine Progress Note - Gold Service      Assessment & Plan   Antoine Russo is a 72 year old male with history of necrotizing pancreatitis, IPMN (s/p distal pancreatectomy c/b pancreatic leak in 2016) s/p several ERCP/EGD (most recent EGD 4/17 with PD stent removal, ERCP 3/29), A fib with RVR, HTN, DMII, peripheral neuropathy, and BPH who was admitted 4/20/2018 with acute abdominal pain. Of note, patient just discharged from [facility] with necrotizing pancreatitis 4/18/2018      Acute abdominal pain, necrotizing pancreatitis: H/o IPMN (s/p distal pancreatectomy c/b pancreatic leak in 2016). Repeat ERCPs over the past 4-5 months due to recurrent pancreatitis. CTAP 4/16 new  pancreatic necrosis of pancreatic head/uncinate process, necrotic lymph nodes. S/p EGD 4/17 with PD stent removal and symptomatic relief. Returned to ED 4/20 with acute abdominal pain. Per patient, sober since 1/2018. No etoh level obtained in the ED. Lipase 552. WBC 21.2. Appears comfortable, pain well controlled 4/20  - GI consulted, per d/w fellow, unlikely to have intervention at this time. Hold off on antibiotics   - D/w nutrition, if malabsorption present, could be causing some of his pain. Obtain vitamins A, D, E, K and fecal elastase  - Holding Coumadin for possible intervention   - EGT  - Will need care plan for acute on chronic abdominal pain in the future given level of comfort today, modest lipase elevation, etc  - NPO for now, LR at 150 cc/hr  - Oral dilaudid prn for pain     Non-severe nutrition: NJ suggested by GI last admission, patient refused  - Nutrition consulted  - Calorie counts  - Prealbumin and workup as above      Chronic a fib: PTA on metoprolol, coumadin. INR 1.86  - Continue PTA metoprolol with hold parameters  - Coumadin on hold as above      DMII: A1c 7.7 3/2018. PTA on Metformin, glipizide  - Hold PTA meds   - MSSI, hypoglycemia protocol while  NPO      HTN, CAD: PTA on metoprolol, lisinopril. Stress test 2009 inferior wall ischemia. Cath with moderate CAD with stenosis of 40-50% in LAD and RCA. No stents. Echo 1/2018 (done while in Afib with rates of 100-110); EF 55-60%.   - Continue PTA meds with hold parameters           Other Chronic Medical Issues:  BPH: Continue PTA flomax   Peripheral neuropathy: Continue PTA gabapentin  H/o c diff s/p fecal transplant: Tx 2012. 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 S/p forearm free flap reconstruction. No radiation  QTc prolongation: QTc 455 4/16. Avoid prolonging meds      Pain assessment:  Current Pain Score 4/20/2018   Patient currently in pain? yes   Pain score (0-10) -   Pain location Abdomen   Pain descriptors Aching   - Antoine is experiencing pain due to necrotizing pancreatitis. Pain management was discussed and the plan was created in a collaborative fashion.  Antoine's response to the current recommendations: compliant  - Please see the plan for pain management as documented above      Diet: NPO per Anesthesia Guidelines for Procedure/Surgery Except for: Meds  Fluids:  cc/hr  DVT Prophylaxis: Pneumatic Compression Devices, Coumadin on hold as above  Code Status: Full Code    Disposition Plan   Expected discharge: 2 - 3 days, recommended to prior living arrangement once workup/interentions complete, tolerating diet.     Entered: Deb Pichardo 04/20/2018, 11:07 AM   Information in the above section will display in the discharge planner report.      The patient's care was discussed with the patient, nutrition, nursing, care coordination, and attending physician, Dr. Kathy Pichardo  Internal Medicine Staff Hospitalist Service  [facility]  Pager: [phone]  Please see sticky note for cross cover information    Interval History    Feeling pretty well today. Abdominal pain stable. Slightly hungry. Denies chest pain or dyspnea. No nausea or emesis. Denies fever or chills. No confusion.     Data reviewed today: I reviewed all medications, new labs and imaging results over the last 24 hours. I personally reviewed H&P, recent admission notes and workup     Physical Exam   Vital Signs: Temp: 99  F (37.2  C) Temp src: Oral BP: 116/87 Pulse: 138 Heart Rate: 88 Resp: 14 SpO2: 93 % O2 Device: None (Room air) Oxygen Delivery: 2 LPM  Weight: 0 lbs 0 oz  General Appearance: Alert and oriented x3, appears comfortable  Respiratory: CTAB without wheezing or rales  Cardiovascular: A fib, no murmur noted  GI: Abdomens soft, epigastric and left of midline upper abdomen tenderness. No guarding or rebound. Bowel sounds active  Skin: No rash or jaundice  Other: Distal pulses palpable. No peripheral ededma    Data   Data     Recent Labs  Lab 04/20/18  0145 04/18/18  1021 04/18/18  0450 04/18/18  0040 04/17/18  0426 04/16/18  1040   WBC 21.2*  --   --  17.0* 15.4* 18.5*   HGB 11.1*  --   --  12.1* 11.3* 13.3   MCV 99  --   --  101* 101* 99     --   --  532* 470* 624*   INR 1.86* 3.43*  --   --  3.23* 2.59*     --  137  --  138 135   POTASSIUM 4.3  --  4.3  --  4.6 5.2   CHLORIDE 99  --  100  --  104 102   CO2 29  --  28  --  27 23   BUN 8  --  6*  --  9 16   CR 0.92  --  0.89  --  0.90 1.08   ANIONGAP 6  --  9  --  8 10   NILSON 9.1  --  8.8  --  8.8 10.1   *  --  220*  --  150* 216*   ALBUMIN 2.2*  --   --   --  2.0* 2.7*   PROTTOTAL 6.8  --   --   --  5.8* 7.4   BILITOTAL 0.7  --   --   --  0.5 0.5   ALKPHOS 72  --   --   --  66 84   ALT 10  --   --   --  12 16   AST 7  --   --   --  10 17   LIPASE 552*  --   --   --   --  7445*

## 2018-04-20 NOTE — PROGRESS NOTES
CLINICAL NUTRITION SERVICES - ASSESSMENT NOTE     Nutrition Prescription    RECOMMENDATIONS FOR MDs/PROVIDERS TO ORDER:  Recommend checking fecal elastase test for possible malabsorption d/t on going weight loss. Also, recommend checking fat soluble vitamins ADEK.     Malnutrition Status:    Severe malnutrition in the context of acute on chronic illness    Recommendations already ordered by Registered Dietitian (RD):  RD to order multivitamin to ensure all micronutrients are being met  RD to order supplements with meals prn (preference: chocolate boost)    Future/Additional Recommendations:  1. Recommend robles counts once pts diet advances.   2. If EN appropriate, would recommend: Impact Peptide @ goal 70 ml/hr (1680 ml/day) to provide 2520 kcals (30 kcal/kg/day), 158 g PRO (1.9 g/kg/day), 1294 ml free H2O, 108 g Fat (50% from MCTs), 235 g CHO and no Fiber daily.     REASON FOR ASSESSMENT  Antoine Russo is a/an 72 year old male assessed by the dietitian for Admission Nutrition Risk Screen for reduced oral intake over the last month    NUTRITION HISTORY  Pt was just seen by inpatient RD on 4/16/18, where he had reported eating < baseline of ~2 meals per day, but was feeling more fatigued and had abdominal pain. He was excited that his diet had advanced to CL but reported he was not eating green veggies and not longer taking boost/ensure d/t it being too high in vitamin K+.     Per chart/MD: Antoine Russo is a 72 year old male with a medical history significant for recurrent pancreatitis and IPMN s/p distal pancreatectomy c/b pancreatic leak in 2016 s/p ERCP with biliary sphincterectomy and stent placement (CBD and PD to decompress leak) 3/29/2017, atrial fibrillation with RVR, hypertension, type 2 diabetes mellitus, peripheral neuropathy and BPH. Pt with numerous admission d/t acute pancreatitis with newer imaging showing necrotizing pancreatitis. Recent CT scan from 4/16/2018 shows evidence of necrotic  "pancreatic tissue involving the head.     Per pt: reports that he has been drinking one Boost /day as his MD told him to be consistent with the supplements and he can have them d/t warfarin needs. He reports that he doesn't know if his stool is oily, but that it is just loose.      CURRENT NUTRITION ORDERS  Diet: NPO  Intake/Tolerance: pt repots eating some cereal sine discharged and that is about all he has consuming PO.     LABS  Lipase: 552, Labs reviewed    MEDICATIONS  Medications reviewed    ANTHROPOMETRICS  Height: 5' 10\"  Most Recent Weight:  83.9    IBW: 75.5 kg  BMI: Overweight BMI 25-29.9  Weight History: ~7% wt loss in the past 1 month, ~9% wt loss in the past 3 months  Wt Readings from Last 10 Encounters:   04/19/18 83.9 kg (185 lb)   04/17/18 83.2 kg (183 lb 6.4 oz)   04/16/18 82.4 kg (181 lb 9.6 oz)   04/02/18 92.4 kg (203 lb 9.6 oz)   03/30/18 90.7 kg (200 lb)   03/29/18 91.1 kg (200 lb 13.4 oz)   03/16/18 89.8 kg (198 lb)   03/07/18 90.6 kg (199 lb 11.2 oz)   02/28/18 87.3 kg (192 lb 7.4 oz)   01/30/18 92.1 kg (203 lb)     Dosing Weight: 84 kg    ASSESSED NUTRITION NEEDS  Estimated Energy Needs: 6276-9615 kcals/day (25 - 30 kcals/kg)  Justification: Maintenance  Estimated Protein Needs: 126-168 grams protein/day (1.5-2 grams of pro/kg)  Justification: Hypercatabolism with acute illness, necrotizing panc  Estimated Fluid Needs: 2082-3046 mL/day (1 mL/kcal)   Justification: Maintenance    PHYSICAL FINDINGS  See malnutrition section below.     MALNUTRITION  % Intake: < 75% for >/= 1 month (non-severe)  % Weight Loss: > 5% in 1 month (severe)  Subcutaneous Fat Loss: Facial region, Upper arm and Lower arm:mild  Muscle Loss: Temporal, Thoracic region (clavicle, acromium bone, deltoid, trapezius, pectoral), Upper arm (bicep, tricep), Lower arm  (forearm) and Upper leg (quadricep, hamstring): mild-moderate  Fluid Accumulation/Edema: None noted  Malnutrition Diagnosis: Severe malnutrition in the context of " acute on chronic illness    NUTRITION DIAGNOSIS  Inadequate oral intake r/t decreased appetite, fatigue with chronic illness, and abdominal pain AEB suspect eating <75% of estimated needs for >1 month with resulting wt loss of ~7% of body wt.     INTERVENTIONS  Implementation  Nutrition Education: Provided education on role of RD and nutrition POC   Pt just recently educated on low fat diet d/t necrotizing pancreatitis   Medical food supplement therapy: ordered supplements with meals prn (preference: strawberry or chocolate boost)  Multivitamin   Spoke with MD regarding possible check for fat soluble vitamins and possible fecal elastase test to determine if pt needs enzymes. Per provider, GI is wanting pt to have a feeding tube placed but pt is refusing at this time.     Goals  Diet adv v nutrition support within 2-3 days.  Total avg nutritional intake to meet a minimum of 25 kcal/kg and 1.2 g PRO/kg daily (per dosing wt 84 kg).     Monitoring/Evaluation  Progress toward goals will be monitored and evaluated per protocol.      Bredna aSlazar RD, MS, LD  6B- Pager: [phone]

## 2018-04-20 NOTE — PLAN OF CARE
Problem: Pancreatitis, Acute/Chronic (Adult)  Goal: Signs and Symptoms of Listed Potential Problems Will be Absent, Minimized or Managed (Pancreatitis, Acute/Chronic)  Signs and symptoms of listed potential problems will be absent, minimized or managed by discharge/transition of care (reference Pancreatitis, Acute/Chronic (Adult) CPG).   Neuro: A&Ox4.   Cardiac: Chronic A-fib 80-90. VSS.   Respiratory: Sating 95% on RA.  GI/: Adequate urine output. No BM needs stool sample sent.  Diet/appetite: NPO x meds  Activity:  Independent up to the bathroom.  Pain: At acceptable level on current regimen. PO dilaudid q 3 hours.  Skin: Intact, no new deficits noted.  LDA's: PIV   Waiting on GI consults.  Pt ready to transfer to med/surg when a bed is available.    Plan: Continue with POC. Notify primary team with changes.    Problem: Diabetes Comorbidity  Goal: Diabetes  Patient comorbidity will be monitored for signs and symptoms of hyperglycemia or hypoglycemia. Problems will be absent, minimized or managed by discharge/transition of care.   Outcome: No Change  Pts PO antidiabetic meds are on hold while NPO, BG stable under 140.    Problem: Gastrointestinal Condition Comorbidity  Goal: Gastrointestinal Condition  Patient comorbidity will be monitored for signs and symptoms of Gastrointestinal condition.  Problems will be absent, minimized or managed by discharge/transition of care.   Outcome: No Change  Pt has LUQ abdominal pain.  PO Dilaudid q 3 hrs is managing his pain currently.

## 2018-04-20 NOTE — PROVIDER NOTIFICATION
DAILY ROUNDS CHECKLIST:     RN and Provider saw patient together: NO  Checklist was reviewed with Deb DEMPSEY    Status/Level of care:    - IMC:  change Med/Surg no tele    Tethers:   - Telemetry: terminate   - Urinary Catheter: Not present   - No line:  continue    Anticoagulation for VTE prophylaxis:    - Reviewed with provider: YES    Rehab:   - PT/OT indicated and ordered       Time until discharge:    - 2 - 3 days once adequate pain management/ tolerating PO medications

## 2018-04-21 VITALS
DIASTOLIC BLOOD PRESSURE: 60 MMHG | HEART RATE: 138 BPM | TEMPERATURE: 99.3 F | SYSTOLIC BLOOD PRESSURE: 100 MMHG | OXYGEN SATURATION: 91 % | RESPIRATION RATE: 18 BRPM

## 2018-04-21 LAB
ANION GAP SERPL CALCULATED.3IONS-SCNC: 8 MMOL/L (ref 3–14)
BUN SERPL-MCNC: 5 MG/DL (ref 7–30)
CALCIUM SERPL-MCNC: 8.5 MG/DL (ref 8.5–10.1)
CHLORIDE SERPL-SCNC: 99 MMOL/L (ref 94–109)
CO2 SERPL-SCNC: 26 MMOL/L (ref 20–32)
CREAT SERPL-MCNC: 0.73 MG/DL (ref 0.66–1.25)
ERYTHROCYTE [DISTWIDTH] IN BLOOD BY AUTOMATED COUNT: 15.4 % (ref 10–15)
ETHYL GLUCURONIDE UR QL: NEGATIVE
GFR SERPL CREATININE-BSD FRML MDRD: >90 ML/MIN/1.7M2
GLUCOSE BLDC GLUCOMTR-MCNC: 133 MG/DL (ref 70–99)
GLUCOSE BLDC GLUCOMTR-MCNC: 154 MG/DL (ref 70–99)
GLUCOSE SERPL-MCNC: 132 MG/DL (ref 70–99)
HCT VFR BLD AUTO: 34.1 % (ref 40–53)
HGB BLD-MCNC: 10.7 G/DL (ref 13.3–17.7)
INR PPP: 2.3 (ref 0.86–1.14)
MCH RBC QN AUTO: 31 PG (ref 26.5–33)
MCHC RBC AUTO-ENTMCNC: 31.4 G/DL (ref 31.5–36.5)
MCV RBC AUTO: 99 FL (ref 78–100)
PLATELET # BLD AUTO: 405 10E9/L (ref 150–450)
POTASSIUM SERPL-SCNC: 4.2 MMOL/L (ref 3.4–5.3)
RBC # BLD AUTO: 3.45 10E12/L (ref 4.4–5.9)
SODIUM SERPL-SCNC: 134 MMOL/L (ref 133–144)
WBC # BLD AUTO: 17.5 10E9/L (ref 4–11)

## 2018-04-21 PROCEDURE — 25000132 ZZH RX MED GY IP 250 OP 250 PS 637: Performed by: INTERNAL MEDICINE

## 2018-04-21 PROCEDURE — 85610 PROTHROMBIN TIME: CPT | Performed by: PHYSICIAN ASSISTANT

## 2018-04-21 PROCEDURE — 40000893 ZZH STATISTIC PT IP EVAL DEFER

## 2018-04-21 PROCEDURE — 85027 COMPLETE CBC AUTOMATED: CPT | Performed by: PHYSICIAN ASSISTANT

## 2018-04-21 PROCEDURE — 36415 COLL VENOUS BLD VENIPUNCTURE: CPT | Performed by: PHYSICIAN ASSISTANT

## 2018-04-21 PROCEDURE — 00000146 ZZHCL STATISTIC GLUCOSE BY METER IP

## 2018-04-21 PROCEDURE — 25000132 ZZH RX MED GY IP 250 OP 250 PS 637: Performed by: PHYSICIAN ASSISTANT

## 2018-04-21 PROCEDURE — 25000128 H RX IP 250 OP 636: Performed by: PHYSICIAN ASSISTANT

## 2018-04-21 PROCEDURE — 25000132 ZZH RX MED GY IP 250 OP 250 PS 637: Performed by: STUDENT IN AN ORGANIZED HEALTH CARE EDUCATION/TRAINING PROGRAM

## 2018-04-21 PROCEDURE — 99239 HOSP IP/OBS DSCHRG MGMT >30: CPT | Performed by: INTERNAL MEDICINE

## 2018-04-21 PROCEDURE — 80048 BASIC METABOLIC PNL TOTAL CA: CPT | Performed by: PHYSICIAN ASSISTANT

## 2018-04-21 RX ORDER — HYDROMORPHONE HYDROCHLORIDE 2 MG/1
2 TABLET ORAL
Qty: 10 TABLET | Refills: 0 | Status: SHIPPED | OUTPATIENT
Start: 2018-04-21 | End: 2018-06-21

## 2018-04-21 RX ADMIN — MULTIPLE VITAMINS W/ MINERALS TAB 1 TABLET: TAB at 08:01

## 2018-04-21 RX ADMIN — HYDROMORPHONE HYDROCHLORIDE 2 MG: 2 TABLET ORAL at 01:05

## 2018-04-21 RX ADMIN — SODIUM CHLORIDE, POTASSIUM CHLORIDE, SODIUM LACTATE AND CALCIUM CHLORIDE: 600; 310; 30; 20 INJECTION, SOLUTION INTRAVENOUS at 07:52

## 2018-04-21 RX ADMIN — SODIUM CHLORIDE, POTASSIUM CHLORIDE, SODIUM LACTATE AND CALCIUM CHLORIDE: 600; 310; 30; 20 INJECTION, SOLUTION INTRAVENOUS at 01:06

## 2018-04-21 RX ADMIN — LISINOPRIL 10 MG: 10 TABLET ORAL at 07:58

## 2018-04-21 RX ADMIN — TAMSULOSIN HYDROCHLORIDE 0.4 MG: 0.4 CAPSULE ORAL at 07:58

## 2018-04-21 RX ADMIN — METOPROLOL SUCCINATE 50 MG: 50 TABLET, EXTENDED RELEASE ORAL at 07:58

## 2018-04-21 RX ADMIN — HYDROMORPHONE HYDROCHLORIDE 2 MG: 2 TABLET ORAL at 04:05

## 2018-04-21 NOTE — PLAN OF CARE
Problem: Patient Care Overview  Goal: Plan of Care/Patient Progress Review  Outcome: Adequate for Discharge Date Met: 04/21/18  Patient discharged to home at 1025 via  (to [facility]) in stable condition. Discharge instructions given including future MD appointments done. Patient and wife verbalized understanding of Dc instructions and had no questions. Personal belongings taken by patient.

## 2018-04-21 NOTE — PLAN OF CARE
Problem: Patient Care Overview  Goal: Plan of Care/Patient Progress Review  5B PT: PT orders acknowledged and appreciated. Consulted with patient in AM. Patient up ind amb with no concerns per RN. Discussed roles of acute care PT and OT with patient. Patient confident he can ambulate and negotiate stairs necessary for his home environment safely. He states he does not need skilled PT or OT during hospital stay. Deferred.

## 2018-04-21 NOTE — PLAN OF CARE
Problem: Patient Care Overview  Goal: Plan of Care/Patient Progress Review  Outcome: Improving  A/O X4. Able to make needs known, uses call light appropriately.up ad guerrero in room. C/o abdominal pain, adequately controlled w/ PRN oral dilaudid. VSS; afebrile; Lung sounds clear/ dim in bases; on RA.  Adequate urine output. No BM this hospitalization. LR infusing through R PIV at 150 ml/hr. Report given to 5B RN.

## 2018-04-21 NOTE — PLAN OF CARE
Problem: Patient Care Overview  Goal: Plan of Care/Patient Progress Review  OT/5B: Per PT consult, patient ind with ADLS and mobility. Has been up walking ind in room and has no concerns for home d/c when medically appropriate. Pt has spouse assist as needed. No further OT concerns at this time. Defer and complete OT orders, please refer back to OT for any change from baseline with function/cognition.

## 2018-04-21 NOTE — PLAN OF CARE
Problem: Patient Care Overview  Goal: Plan of Care/Patient Progress Review  Outcome: No Change  Transferred to unit 5B from 6B. C/o abdominal pain. Denies nausea. Ambulates independently. Pulse tachy. Denies feeling symptomatic. Lactate 1.1. Hs blood glucose 160. Continue to assess pain and GI status. Collect stool per order.

## 2018-04-21 NOTE — SUMMARY OF CARE
Transferred to  232-1 from . Cell phone, manuel, 1 changer, jacket, crocs, black backpack, glasses, medic alert necklace.

## 2018-04-23 ENCOUNTER — TELEPHONE (OUTPATIENT)
Dept: FAMILY MEDICINE | Facility: CLINIC | Age: 73
End: 2018-04-23

## 2018-04-23 ENCOUNTER — CARE COORDINATION (OUTPATIENT)
Dept: CARE COORDINATION | Facility: CLINIC | Age: 73
End: 2018-04-23

## 2018-04-23 ENCOUNTER — PATIENT OUTREACH (OUTPATIENT)
Dept: CARE COORDINATION | Facility: CLINIC | Age: 73
End: 2018-04-23

## 2018-04-23 ENCOUNTER — ANTICOAGULATION THERAPY VISIT (OUTPATIENT)
Dept: ANTICOAGULATION | Facility: CLINIC | Age: 73
End: 2018-04-23
Payer: COMMERCIAL

## 2018-04-23 DIAGNOSIS — I48.20 CHRONIC ATRIAL FIBRILLATION (H): ICD-10-CM

## 2018-04-23 DIAGNOSIS — Z79.01 LONG-TERM (CURRENT) USE OF ANTICOAGULANTS: ICD-10-CM

## 2018-04-23 LAB
INR POINT OF CARE: 2.2 (ref 0.86–1.14)
PHYTONADIONE SERPL-MCNC: 0.48 NMOL/L (ref 0.22–4.88)

## 2018-04-23 PROCEDURE — 36416 COLLJ CAPILLARY BLOOD SPEC: CPT

## 2018-04-23 PROCEDURE — 85610 PROTHROMBIN TIME: CPT | Mod: QW

## 2018-04-23 PROCEDURE — 99207 ZZC NO CHARGE NURSE ONLY: CPT

## 2018-04-23 NOTE — PROGRESS NOTES
Patient was in contact with an RN for post DC follow up so no duplicate post DC follow up call will be made                Doris Gallardo RN           ANTICOAGULATION FOLLOW-UP CLINIC VISIT     Patient Name:  Antoine Russo  Date:  4/23/2018  Contact Type:  Face to Face     SUBJECTIVE:           Patient Findings      Positives Other complaints     Comments Hospitalized at the [facility] for Acute abdominal pain, necrotizing pancreatitis. He was discharged Wednesday and went back in the next day for pain. He states he cannot eat anything he likes without being in pain. He has an appt to meet with a dietician and go over a meal plan. He has bee drinking one Boost a day. Patient advised INR therapeutic.    Writer will have him continue his warfarin maintenance dose another week at home and reevaluate 4/30/18.

## 2018-04-23 NOTE — TELEPHONE ENCOUNTER
ED/UC/IP follow up phone call:    RN please call to follow up.    Number of ED visits in past 12 months = 4  Janie Orn [facility] Sec

## 2018-04-23 NOTE — MR AVS SNAPSHOT
Antoine Russo   4/23/2018 8:30 AM   Anticoagulation Therapy Visit    Description:  72 year old male   Provider:  NB ANTI COAG   Department:  Nb Anticoag           INR as of 4/23/2018     Today's INR 2.2      Anticoagulation Summary as of 4/23/2018     INR goal 2.0-3.0   Today's INR 2.2   Full instructions 1.25 mg on Tue, Sat; 2.5 mg all other days   Next INR check 4/30/2018    Indications   Chronic atrial fibrillation (H) [I48.2]  Long-term (current) use of anticoagulants [Z79.01] [Z79.01]         Your next Anticoagulation Clinic appointment(s)     Apr 30, 2018  9:45 AM CDT   [facility] with [facility]   [facility]    [address] [place] [id number] [phone]              Contact Numbers     Please call [phone] with any problems or questions regarding your therapy.    If you need to cancel and/or reschedule your appointment please call one of the following numbers:  [facility] [phone]  [facility] [phone]  [place] - [phone]  [facility] [phone]  [place] - [phone]            April 2018 Details    Sun Mon Tue Wed Thu Fri Sat     1               2               3               4               5               6               7                 8               9               10               11               12               13               14                 15               16               17               18               19               20               21                 22               23      2.5 mg   See details      24      1.25 mg         25      2.5 mg         26      2.5 mg         27      2.5 mg         28      1.25 mg           29      2.5 mg         30                  Date Details   04/23 This INR check       Date of next INR:  4/30/2018         How to take your warfarin dose     To take:  1.25 mg Take 0.5 of a 2.5 mg tablet.    To take:  2.5 mg Take 1 of the 2.5 mg tablets.

## 2018-04-23 NOTE — PROGRESS NOTES
ANTICOAGULATION FOLLOW-UP CLINIC VISIT    Patient Name:  Antoine Russo  Date:  4/23/2018  Contact Type:  Face to Face    SUBJECTIVE:     Patient Findings     Positives Other complaints    Comments Hospitalized at the [facility] for Acute abdominal pain, necrotizing pancreatitis. He was discharged Wednesday and went back in the next day for pain. He states he cannot eat anything he likes without being in pain. He has an appt to meet with a dietician and go over a meal plan. He has bee drinking one Boost a day. Patient advised INR therapeutic.    Writer will have him continue his warfarin maintenance dose another week at home and reevaluate 4/30/18.           OBJECTIVE    INR Protime   Date Value Ref Range Status   04/23/2018 2.2 (A) 0.86 - 1.14 Final       ASSESSMENT / PLAN  INR assessment THER    Recheck INR In: 1 WEEK    INR Location Clinic      Anticoagulation Summary as of 4/23/2018     INR goal 2.0-3.0   Today's INR 2.2   Maintenance plan 1.25 mg (2.5 mg x 0.5) on Tue, Sat; 2.5 mg (2.5 mg x 1) all other days   Full instructions 1.25 mg on Tue, Sat; 2.5 mg all other days   Weekly total 15 mg   No change documented Doris Gallardo RN   Plan last modified Aparna Kunz RN (3/8/2018)   Next INR check 4/30/2018   Priority INR   Target end date Indefinite    Indications   Chronic atrial fibrillation (H) [I48.2]  Long-term (current) use of anticoagulants [Z79.01] [Z79.01]         Anticoagulation Episode Summary     INR check location     Preferred lab     Send INR reminders to [facility]    Comments *       Anticoagulation Care Providers     Provider Role Specialty Phone number    Katie Gross MD [facility] [phone]            See the Encounter Report to view Anticoagulation Flowsheet and Dosing Calendar (Go to Encounters tab in chart review, and find the Anticoagulation Therapy Visit)        Doris Gallardo, FRANCHESCA

## 2018-04-23 NOTE — PROGRESS NOTES
Clinic Care Coordination Contact    OUTREACH    Referral Information:  Referral Source: IP Handoff    Primary Diagnosis: GI Disorders    Chief Complaint   Patient presents with     Clinic Care Coordination - [facility]     ED to hospital admit for necrotizing pancreatitis        Universal Utilization:   Utilization    Last refreshed: 4/23/2018  9:04 AM:  No Show Count (past year) 1       Last refreshed: 4/23/2018  9:04 AM:  ED visits 1       Last refreshed: 4/23/2018  9:04 AM:  Hospital admissions 5          Current as of: 4/23/2018  9:04 AM             Clinical Concerns:  Patient has been having GI issues since 1/1/2018 related to a history of pancreatitis. He was hospitalized last week for acute exacerbation of his chronic pancreatitis.  CC RN discussed health maintaince plan and reviewed upcoming outpatient appointments: patient will see dietician tomorrow on 4/24/28; has CT abd/pelvis with subsequent GI visit on 5/7/18. Current pain rating today is 2/10 today (better), main concern is advancing diet.        Medication Management:  Reviewed entire medication list; patient has all RX filled, knows how and when to take his medications, and has no questions. He reports he's not had to use his PRN dilaudid for pain today.     Functional Status:  Mobility Status: Independent  Equipment Currently Used at Home: cane, straight, walker, rolling (currently not using assist devices, but has available)  Transportation:  Transportation means:: Regular car     Psychosocial:  Current living arrangement:: I live in a private home with spouse  Type of residence:: Private home - stairs     Resources and Interventions:  Current Resources:  ;    Advanced Care Plans/Directives on file:: No        Goals:   Goals        General    Healthy Eating (pt-stated)     Notes - Note created  4/23/2018  1:50 PM by Linda Khan, RN    Patient states he would like to gain knowledge and advice on proper diet recommendations for his given  diagnosis; he is meeting with the dietician tomorrow to review this in detail.  RN CC will be available in the future if needed.                   Patient/Caregiver understanding:     Future Appointments              Tomorrow Virginia Hubbard RD [facility] Nutrition Education, [facility]    In 1 week NB ANTI COAG [facility]    In 2 weeks UC[facility]2 [facility] CT, [facility]    In 2 weeks Yovanny Beasley MD [facility] Pancreas and Biliary, [facility]           Plan: Patient will attend his coordinated appointments with dietician and GI services, and will make appointment with PCP if symptoms begin to worsen, versus continue to improve.  CC RN will be available if future needs or questions arise.

## 2018-04-24 ENCOUNTER — HOSPITAL ENCOUNTER (EMERGENCY)
Facility: CLINIC | Age: 73
Discharge: HOME | End: 2018-04-24
Attending: EMERGENCY MEDICINE | Admitting: EMERGENCY MEDICINE
Payer: COMMERCIAL

## 2018-04-24 ENCOUNTER — CARE COORDINATION (OUTPATIENT)
Dept: GASTROENTEROLOGY | Facility: CLINIC | Age: 73
End: 2018-04-24

## 2018-04-24 ENCOUNTER — HOSPITAL ENCOUNTER (OUTPATIENT)
Dept: NUTRITION | Facility: CLINIC | Age: 73
Discharge: HOME | End: 2018-04-24
Attending: FAMILY MEDICINE | Admitting: FAMILY MEDICINE
Payer: COMMERCIAL

## 2018-04-24 VITALS
OXYGEN SATURATION: 96 % | BODY MASS INDEX: 25.77 KG/M2 | HEIGHT: 70 IN | DIASTOLIC BLOOD PRESSURE: 54 MMHG | RESPIRATION RATE: 11 BRPM | WEIGHT: 180 LBS | TEMPERATURE: 97.8 F | SYSTOLIC BLOOD PRESSURE: 115 MMHG

## 2018-04-24 DIAGNOSIS — I48.20 CHRONIC ATRIAL FIBRILLATION (H): ICD-10-CM

## 2018-04-24 DIAGNOSIS — L03.113 RIGHT ARM CELLULITIS: ICD-10-CM

## 2018-04-24 LAB
A-TOCOPHEROL VIT E SERPL-MCNC: 14.2 MG/L (ref 5.5–18)
ALBUMIN SERPL-MCNC: 2.4 G/DL (ref 3.4–5)
ALP SERPL-CCNC: 95 U/L (ref 40–150)
ALT SERPL W P-5'-P-CCNC: 11 U/L (ref 0–70)
ANION GAP SERPL CALCULATED.3IONS-SCNC: 6 MMOL/L (ref 3–14)
ANNOTATION COMMENT IMP: ABNORMAL
AST SERPL W P-5'-P-CCNC: 17 U/L (ref 0–45)
BASOPHILS # BLD AUTO: 0 10E9/L (ref 0–0.2)
BASOPHILS NFR BLD AUTO: 0.1 %
BETA+GAMMA TOCOPHEROL SERPL-MCNC: 1 MG/L (ref 0–6)
BILIRUB SERPL-MCNC: 0.6 MG/DL (ref 0.2–1.3)
BUN SERPL-MCNC: 11 MG/DL (ref 7–30)
CALCIUM SERPL-MCNC: 9.1 MG/DL (ref 8.5–10.1)
CHLORIDE SERPL-SCNC: 99 MMOL/L (ref 94–109)
CO2 SERPL-SCNC: 26 MMOL/L (ref 20–32)
CREAT SERPL-MCNC: 0.92 MG/DL (ref 0.66–1.25)
DIFFERENTIAL METHOD BLD: ABNORMAL
EOSINOPHIL # BLD AUTO: 0 10E9/L (ref 0–0.7)
EOSINOPHIL NFR BLD AUTO: 0.2 %
ERYTHROCYTE [DISTWIDTH] IN BLOOD BY AUTOMATED COUNT: 15.2 % (ref 10–15)
GFR SERPL CREATININE-BSD FRML MDRD: 81 ML/MIN/1.7M2
GLUCOSE SERPL-MCNC: 177 MG/DL (ref 70–99)
HCT VFR BLD AUTO: 38.1 % (ref 40–53)
HGB BLD-MCNC: 12 G/DL (ref 13.3–17.7)
IMM GRANULOCYTES # BLD: 0.1 10E9/L (ref 0–0.4)
IMM GRANULOCYTES NFR BLD: 0.7 %
INR PPP: 2.23 (ref 0.86–1.14)
LACTATE BLD-SCNC: 1.8 MMOL/L (ref 0.7–2)
LYMPHOCYTES # BLD AUTO: 2.1 10E9/L (ref 0.8–5.3)
LYMPHOCYTES NFR BLD AUTO: 12.5 %
MCH RBC QN AUTO: 30.7 PG (ref 26.5–33)
MCHC RBC AUTO-ENTMCNC: 31.5 G/DL (ref 31.5–36.5)
MCV RBC AUTO: 97 FL (ref 78–100)
MONOCYTES # BLD AUTO: 1.4 10E9/L (ref 0–1.3)
MONOCYTES NFR BLD AUTO: 8.5 %
NEUTROPHILS # BLD AUTO: 13.2 10E9/L (ref 1.6–8.3)
NEUTROPHILS NFR BLD AUTO: 78 %
PLATELET # BLD AUTO: 410 10E9/L (ref 150–450)
POTASSIUM SERPL-SCNC: 4.8 MMOL/L (ref 3.4–5.3)
PROT SERPL-MCNC: 8 G/DL (ref 6.8–8.8)
RBC # BLD AUTO: 3.91 10E12/L (ref 4.4–5.9)
RETINYL PALMITATE SERPL-MCNC: <0.02 MG/L (ref 0–0.1)
SODIUM SERPL-SCNC: 131 MMOL/L (ref 133–144)
TROPONIN I SERPL-MCNC: <0.015 UG/L (ref 0–0.04)
TSH SERPL DL<=0.005 MIU/L-ACNC: 1.12 MU/L (ref 0.4–4)
VIT A SERPL-MCNC: 0.16 MG/L (ref 0.3–1.2)
WBC # BLD AUTO: 16.9 10E9/L (ref 4–11)

## 2018-04-24 PROCEDURE — 83605 ASSAY OF LACTIC ACID: CPT | Performed by: EMERGENCY MEDICINE

## 2018-04-24 PROCEDURE — 25000128 H RX IP 250 OP 636: Performed by: EMERGENCY MEDICINE

## 2018-04-24 PROCEDURE — 96361 HYDRATE IV INFUSION ADD-ON: CPT | Performed by: EMERGENCY MEDICINE

## 2018-04-24 PROCEDURE — 93005 ELECTROCARDIOGRAM TRACING: CPT | Performed by: EMERGENCY MEDICINE

## 2018-04-24 PROCEDURE — 85025 COMPLETE CBC W/AUTO DIFF WBC: CPT | Performed by: EMERGENCY MEDICINE

## 2018-04-24 PROCEDURE — 85610 PROTHROMBIN TIME: CPT | Performed by: EMERGENCY MEDICINE

## 2018-04-24 PROCEDURE — 80053 COMPREHEN METABOLIC PANEL: CPT | Performed by: EMERGENCY MEDICINE

## 2018-04-24 PROCEDURE — 93010 ELECTROCARDIOGRAM REPORT: CPT | Mod: Z6 | Performed by: EMERGENCY MEDICINE

## 2018-04-24 PROCEDURE — 97802 MEDICAL NUTRITION INDIV IN: CPT | Performed by: DIETITIAN, REGISTERED

## 2018-04-24 PROCEDURE — 25000125 ZZHC RX 250: Performed by: EMERGENCY MEDICINE

## 2018-04-24 PROCEDURE — 84484 ASSAY OF TROPONIN QUANT: CPT | Performed by: EMERGENCY MEDICINE

## 2018-04-24 PROCEDURE — 99284 EMERGENCY DEPT VISIT MOD MDM: CPT | Mod: 25 | Performed by: EMERGENCY MEDICINE

## 2018-04-24 PROCEDURE — 84443 ASSAY THYROID STIM HORMONE: CPT | Performed by: EMERGENCY MEDICINE

## 2018-04-24 PROCEDURE — 96374 THER/PROPH/DIAG INJ IV PUSH: CPT | Performed by: EMERGENCY MEDICINE

## 2018-04-24 RX ORDER — ONDANSETRON 2 MG/ML
4 INJECTION INTRAMUSCULAR; INTRAVENOUS EVERY 6 HOURS PRN
Status: CANCELLED
Start: 2018-04-24

## 2018-04-24 RX ORDER — SODIUM CHLORIDE 9 MG/ML
1000 INJECTION, SOLUTION INTRAVENOUS CONTINUOUS
Status: DISCONTINUED | OUTPATIENT
Start: 2018-04-24 | End: 2018-04-24 | Stop reason: HOSPADM

## 2018-04-24 RX ORDER — CEPHALEXIN 500 MG/1
500 CAPSULE ORAL 4 TIMES DAILY
Qty: 28 CAPSULE | Refills: 0 | Status: SHIPPED | OUTPATIENT
Start: 2018-04-24 | End: 2018-05-01

## 2018-04-24 RX ORDER — METOPROLOL TARTRATE 1 MG/ML
5 INJECTION, SOLUTION INTRAVENOUS EVERY 5 MIN PRN
Status: DISCONTINUED | OUTPATIENT
Start: 2018-04-24 | End: 2018-04-24 | Stop reason: HOSPADM

## 2018-04-24 RX ADMIN — METOPROLOL TARTRATE 5 MG: 1 INJECTION, SOLUTION INTRAVENOUS at 09:59

## 2018-04-24 RX ADMIN — SODIUM CHLORIDE 1000 ML: 9 INJECTION, SOLUTION INTRAVENOUS at 09:59

## 2018-04-24 NOTE — ED PROVIDER NOTES
History     Chief Complaint   Patient presents with     Wound Infection     red area at site of iv which was removed Saturday - hx afib  -[facility] PT STATES     HPI  Antoine Russo is a 72 year old male who presents with concerns for possible infection in his right forearm.  Patient was recently hospitalized and had an IV removed from that site.  Patient was admitted for abdominal pain with chronic pancreatitis.  He was discharged on the 21st and had IV removed that time.  He now presents with a non-painful reddened area on his forearm.  No drainage from the site.  No fever or chills.  He denies chest pain or shortness of breath.  He was noted to be tachycardic in triage with a history of chronic pancreatitis.  I did review recent records and on 5 most recent visits to either clinic or hospital heart rate was .  Patient is on Coumadin and and is on Toprol for rate control.  No change in his meds recently.  He has been therapeutic on his INR.  The arm is not significantly full and are painful.  No treatment for the arm lesion prior to arrival.  He has no leg swelling.  He continues to have mild abdominal pain due to his chronic pancreatitis.  No vomiting or diarrhea.    Problem List:    Patient Active Problem List    Diagnosis Date Noted     Anticipated difficulty with intubation 05/23/2017     Priority: High     Class: Chronic     Difficult two hands mask ventilation, intubated multiple times asleep with video laryngoscope. H/o tongue cancer surgery.        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 Please refer if Care Coordination services are needed.    Presenting Problem Signs and Symptoms Treatment Plan   Questions or concerns   during clinic hours   I will call my clinic directly:  [address], [place] [address]  [phone].   Questions or concerns outside clinic hours   I will call the 24 hour nurse line at   [phone] or [facility].   Need to schedule an appointment   I will call the 24 hour scheduling team at [phone] or my clinic directly at [phone].   Same day treatment     I will call my clinic first, nurse line if after hours, urgent care and express care if needed.   Clinic care coordination services (regular clinic hours)   I will call a clinic care coordinator directly:     Shelia Emanuel RN [facility]  [phone]    Brianna Cristobal :    [phone]    Or call my clinic at [phone] and ask to speak with care coordination.   Crisis Services: Behavioral or Mental Health  [facility] 24 Hour Phone Line  [phone]    [facility] 24 Hour Crisis Services  [phone]    [facility] (Behavioral Health Providers) Network [phone]    [facility]   [phone]     Emergency treatment -- Immediately    [facility]All 911                Clostridium difficile enterocolitis 12/18/2012     Priority: Medium     Groin fluid collection 12/15/2012     Priority: Medium     CT 12/12- New (since  5/11) collection measuring at least 2.3 cm in diameter and 6.5 cm in length within the right inguinal canal. 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 Gerson Ravi and aristides at the  in 2012  NAD 2017  CT scan of the neck every 2-3 years.  - Thyroid labs yearly.  - Carotid ultrasound in three to four years to evaluate for stenosis.       Colon polyp 08/26/2011     Priority: Medium     Colonoscopy 8/2011-A sessile polyp was found in the cecum. The polyp was 6 mm in size. The polyp was removed with a hot snare. Resection and retrieval were complete. A sessile polyp was found in the proximal transverse colon. The polyp was 15 mm in size. The polyp was removed with a hot snare. Resection and retrieval were complete. A sessile polyp was found in the sigmoid colon. The polyp was 5 mm in size       Advanced directives, counseling/discussion 06/03/2011     Priority: Medium     Hyperlipidemia LDL goal <100 10/31/2010     Priority: Medium     CAD (coronary artery disease) 05/26/2009     Priority: Medium     Stress testing 2009 showed inferior wall ischemia.  Cath preformed; identified moderate CAD with stenosis of 40-50% in LAD and RCA.  No stents were placed.  Echo in 01/2018 (done while in Afib with rates of 100-110); EF of 55-60%.  No RWMA.       GERD (gastroesophageal reflux disease) 05/26/2009     Priority: Medium     Hypertrophy of breast 09/25/2007     Priority: Medium     Diverticulitis of colon 07/17/2007     Priority: Medium     Colitis on CT Scan 5/2011- [place]  Colonoscopy 8/2011 Diverticulitis - Multiple small and large-mouthed diverticula  were found in the mid sigmoid colon and at the hepatic flexure. There was narrowing of the colon in association with the diverticular opening. Nena-diverticular erythema was seen. There was evidence of an impacted diverticulum. Purulent discharge was seen in association with the diverticlar opening. Biopsies were taken with a cold forceps for histology. Multiple small and large-mouthed diverticula were found in the sigmoid colon, in the descending colon, in the transverse colon and in the ascending colon.   Hospitalized diverticulitis 12/12           PERS HX TOBACCO USE - quit in 11/06 with chantix 03/15/2007     Priority: Medium     Hypertension, benign essential, goal below 140/90 11/07/2005     Priority: Medium     Patient has only fair bp control and with family history of diabetes will all ace if lab indicated also has bph and may op for psa and not digital exam next yr        Pain in joint, shoulder region 11/07/2005     Priority: Medium     Hypertrophy of prostate without urinary obstruction 11/07/2005     Priority: Medium     Problem list name updated by automated process. 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 difficile colitis      Colon polyp      Coronary artery disease      Diabetes mellitus (H)      Diverticulitis      Hypertension      Malignant neoplasm (H)      Noninfectious ileitis      Recurrent pancreatitis (H)        Past Surgical History:    Past Surgical History:   Procedure Laterality Date     BREAST SURGERY  2008    right breast mass benign     COLONOSCOPY      multiple polyps removed     COLONOSCOPY  8/24/2011    Procedure:COMBINED COLONOSCOPY, REMOVE TUMOR/POLYP/LESION BY SNARE; Surgeon:MILEY ARBOLEDA; Location:[facility]     COLONOSCOPY  12/17/2012    Procedure: COLONOSCOPY;;  Surgeon: Leon Maurer MD;  Location:  GI     COLONOSCOPY  12/18/2012     Procedure: COLONOSCOPY;;  Surgeon: Leon Maurer MD;  [facility]     DENERVATION OF SPERMATIC CORD MICROSURGICAL Left 5/23/2017    Procedure: DENERVATION OF SPERMATIC CORD MICROSURGICAL;;  Surgeon: Marcio Aggarwal MD;  [facility]     DISSECTION RADICAL NECK BILATERAL  8/2/2012    Procedure: DISSECTION RADICAL NECK BILATERAL;;  Surgeon: Yung Alvares MD;  [facility]     ENDOSCOPIC RETROGRADE CHOLANGIOPANCREATOGRAM N/A 5/10/2016    Procedure: COMBINED ENDOSCOPIC RETROGRADE CHOLANGIOPANCREATOGRAPHY, PLACE TUBE/STENT;  Surgeon: Yovanny Beasley MD;  [facility]     ENDOSCOPIC RETROGRADE CHOLANGIOPANCREATOGRAM N/A 3/29/2018    Procedure: ENDOSCOPIC RETROGRADE CHOLANGIOPANCREATOGRAM;  Endoscopic Retrograde Cholangiopancreatogram, Endoscopic Ultrasound, Biliary Sphincterotomy, Biliary and Pancreatic Stent Placement;  Surgeon: Yovanny Beasley MD;  [facility]     ENDOSCOPIC ULTRASOUND UPPER GASTROINTESTINAL TRACT (GI) N/A 2/3/2016    Procedure: ENDOSCOPIC ULTRASOUND, ESOPHAGOSCOPY / UPPER GASTROINTESTINAL TRACT (GI);  Surgeon: Grabiel Plata MD;  [facility]     ENDOSCOPIC ULTRASOUND UPPER GASTROINTESTINAL TRACT (GI) N/A 3/29/2018    Procedure: ENDOSCOPIC ULTRASOUND, ESOPHAGOSCOPY / UPPER GASTROINTESTINAL TRACT (GI);;  Surgeon: Grabiel Plata MD;  [facility]     ESOPHAGOSCOPY, GASTROSCOPY, DUODENOSCOPY (EGD), COMBINED N/A 2/3/2016    Procedure: COMBINED ENDOSCOPIC ULTRASOUND, ESOPHAGOSCOPY, GASTROSCOPY, DUODENOSCOPY (EGD), FINE NEEDLE ASPIRATE/BIOPSY;  Surgeon: Grabiel Plata MD;  [facility]     ESOPHAGOSCOPY, GASTROSCOPY, DUODENOSCOPY (EGD), COMBINED N/A 6/8/2016    Procedure: COMBINED ESOPHAGOSCOPY, GASTROSCOPY, DUODENOSCOPY (EGD), REMOVE FOREIGN BODY;  Surgeon: Yovanny Beasley MD;  [facility]     ESOPHAGOSCOPY, GASTROSCOPY, DUODENOSCOPY (EGD), COMBINED N/A 4/17/2018    Procedure: COMBINED ESOPHAGOSCOPY, GASTROSCOPY,  DUODENOSCOPY (EGD), REMOVE FOREIGN BODY;  EGD with stent removal;  Surgeon: Grabiel Plata MD;  [facility]     EXCISE LESION INTRAORAL  6/14/2012    Procedure: EXCISE LESION INTRAORAL;  Wide Local Excision Floor of Mouth, Direct Laryngoscopy, Bilateral [place]'s Marsuplization, Split Thickness Skin Graft from right Thigh  Latex Safe;  Surgeon: Gerson Ravi MD;  [facility]     EXCISE LESION INTRAORAL  8/2/2012    Procedure: EXCISE LESION INTRAORAL;  Floor of Mouth Resection, Bilateral Selective Radical Neck Dissection, Tracheostomy, Left Radial Forearm  Free Flap with Alloderm, Nasogastric Feeding Tube Placement,    * Latex Safe*;  Surgeon: Gerson Ravi MD;  [facility]     EXCISE LESION INTRAORAL  12/11/2012    Procedure: EXCISE LESION INTRAORAL;  takedown of oral flap;  Surgeon: Yung Alvares MD;  [facility]     GRAFT FREE VASCULARIZED (LOCATION)  8/2/2012    Procedure: GRAFT FREE VASCULARIZED (LOCATION);;  Surgeon: Yung Alvares MD;  [facility]     GRAFT SKIN SPLIT THICKNESS FROM EXTREMITY  6/14/2012    Procedure: GRAFT SKIN SPLIT THICKNESS FROM EXTREMITY;;  Surgeon: Gerson Ravi MD;  [facility]     LAPAROSCOPIC ILEOSTOMY TAKEDOWN  6/6/2013    Procedure: LAPAROSCOPIC ILEOSTOMY TAKEDOWN;  Laparoscopic Closure of Enterostomy, Guerda's Type with IleoRectal Anastomosis ;  Surgeon: Grabiel Riddle MD;  [facility]     LAPAROTOMY EXPLORATORY  12/20/2012    Procedure: LAPAROTOMY EXPLORATORY;  Exploratory Laparotomy, total abdominal colectomy, ileostomy formation;  Surgeon: Miquel Cannon MD;  [facility]     LARYNGOSCOPY  6/14/2012    Procedure: LARYNGOSCOPY;;  Surgeon: Gerson Ravi MD;  [facility]     ORTHOPEDIC SURGERY      ganglian cyst left ankle     PANCREATECTOMY, SPLENECTOMY N/A 3/10/2016    Procedure: PANCREATECTOMY, SPLENECTOMY;  Surgeon: Nael Abel MD;  [facility]     SHOULDER SURGERY  2006, 2008    2006- right rotator cuff,  " bone spur on left. Dr. Hdez     VARICOCELECTOMY Left 2017    Procedure: VARICOCELECTOMY;  Left Varicocele Repair, Denervation of Left Testis;  Surgeon: Marcio Aggarwal MD;  [facility]       Family History:    Family History   Problem Relation Age of Onset     DIABETES Sister      onset age 50     Alzheimer Disease Mother       80     Alzheimer Disease Father       85     DIABETES Other      nephew type 1     DIABETES Other      Anesthesia Reaction No family hx of      Colon Cancer No family hx of      Colon Polyps No family hx of      Crohn Disease No family hx of      Ulcerative Colitis No family hx of        Social History:  Marital Status:   [2]  Social History   Substance Use Topics     Smoking status: Former Smoker     Packs/day: 1.00     Years: 40.00     Types: Cigarettes     Quit date: 2006     Smokeless tobacco: Never Used     Alcohol use No        Medications:      cephALEXin (KEFLEX) 500 MG capsule   gabapentin (NEURONTIN) 300 MG capsule   glipiZIDE (GLIPIZIDE XL) 2.5 MG 24 hr tablet   HYDROmorphone (DILAUDID) 2 MG tablet   lisinopril (PRINIVIL/ZESTRIL) 10 MG tablet   metFORMIN (GLUCOPHAGE-XR) 500 MG 24 hr tablet   metoprolol succinate (TOPROL-XL) 50 MG 24 hr tablet   multivitamin, therapeutic with minerals (THERA-VIT-M) TABS   tamsulosin (FLOMAX) 0.4 MG capsule   WARFARIN SODIUM PO   WARFARIN SODIUM PO   ASPIRIN NOT PRESCRIBED (INTENTIONAL)         Review of Systems all other systems are reviewed and are negative.    Physical Exam   BP: 142/89  Heart Rate: 147  Temp: 97.8  F (36.6  C)  Resp: 16  Height: 177.8 cm (5' 10\")  Weight: 81.6 kg (180 lb)  SpO2: 97 %      Physical Exam general alert cooperative male in mild distress.  HEENT shows no facial swelling or asymmetry.  He has no scleral icterus.  Speech is clear.  Neck is supple.  Lung is clear except basilar crackles.  No wheezes.  Cardiac irregularly irregular and tachycardic in the 140 range.  Back does not have " CVA tenderness.  Abdomen is obese with mild epigastric tenderness.  No guarding or rebound.  Extremities reveal no pitting edema.  He has a red domonique on his right forearm from his IV site which is documented in the photo.  No fluctuance associated.  No lymphangitic spread.        ED Course     ED Course     Procedures               EKG Interpretation:      Interpreted by Jose Kelly  Time reviewed: 9:30  Symptoms at time of EKG: None   Rhythm: atrial fibrillation - rapid  Rate: Tachycardia  Axis: Normal  Ectopy: none  Conduction: normal  ST Segments/ T Waves: Non-specific ST-T wave changes  Q Waves: none  Comparison to prior: Unchanged from 2/25/18    Clinical Impression: atrial fibrillation (chronic)                Critical Care time:  none               Results for orders placed or performed during the hospital encounter of 04/24/18 (from the past 24 hour(s))   CBC with platelets differential   Result Value Ref Range    WBC 16.9 (H) 4.0 - 11.0 10e9/L    RBC Count 3.91 (L) 4.4 - 5.9 10e12/L    Hemoglobin 12.0 (L) 13.3 - 17.7 g/dL    Hematocrit 38.1 (L) 40.0 - 53.0 %    MCV 97 78 - 100 fl    MCH 30.7 26.5 - 33.0 pg    MCHC 31.5 31.5 - 36.5 g/dL    RDW 15.2 (H) 10.0 - 15.0 %    Platelet Count 410 150 - 450 10e9/L    Diff Method Automated Method     % Neutrophils 78.0 %    % Lymphocytes 12.5 %    % Monocytes 8.5 %    % Eosinophils 0.2 %    % Basophils 0.1 %    % Immature Granulocytes 0.7 %    Absolute Neutrophil 13.2 (H) 1.6 - 8.3 10e9/L    Absolute Lymphocytes 2.1 0.8 - 5.3 10e9/L    Absolute Monocytes 1.4 (H) 0.0 - 1.3 10e9/L    Absolute Eosinophils 0.0 0.0 - 0.7 10e9/L    Absolute Basophils 0.0 0.0 - 0.2 10e9/L    Abs Immature Granulocytes 0.1 0 - 0.4 10e9/L   INR   Result Value Ref Range    INR 2.23 (H) 0.86 - 1.14   Comprehensive metabolic panel   Result Value Ref Range    Sodium 131 (L) 133 - 144 mmol/L    Potassium 4.8 3.4 - 5.3 mmol/L    Chloride 99 94 - 109 mmol/L    Carbon Dioxide 26 20 - 32 mmol/L    Anion  Gap 6 3 - 14 mmol/L    Glucose 177 (H) 70 - 99 mg/dL    Urea Nitrogen 11 7 - 30 mg/dL    Creatinine 0.92 0.66 - 1.25 mg/dL    GFR Estimate 81 >60 mL/min/1.7m2    GFR Estimate If Black >90 >60 mL/min/1.7m2    Calcium 9.1 8.5 - 10.1 mg/dL    Bilirubin Total 0.6 0.2 - 1.3 mg/dL    Albumin 2.4 (L) 3.4 - 5.0 g/dL    Protein Total 8.0 6.8 - 8.8 g/dL    Alkaline Phosphatase 95 40 - 150 U/L    ALT 11 0 - 70 U/L    AST 17 0 - 45 U/L   Lactic acid whole blood   Result Value Ref Range    Lactic Acid 1.8 0.7 - 2.0 mmol/L   Troponin I   Result Value Ref Range    Troponin I ES <0.015 0.000 - 0.045 ug/L   TSH with free T4 reflex   Result Value Ref Range    TSH 1.12 0.40 - 4.00 mU/L       Medications   0.9% sodium chloride BOLUS (0 mLs Intravenous Stopped 4/24/18 1041)     Followed by   sodium chloride 0.9% infusion (not administered)   metoprolol (LOPRESSOR) injection 5 mg (5 mg Intravenous Given 4/24/18 0959)     IV is established and blood work is obtained.  EKG was obtained and reviewed as above.  Patient was given 1 dose of IV Lopressor and his rate slowed into the 80s.  He remained in A. fib.  He had adequate blood pressure and did not have any new symptomology.  Assessments & Plan (with Medical Decision Making)   Patient is a 72-year-old male with chronic medical issues including atrial fibrillation, chronic abdominal pain due to pancreatitis, retention, coronary artery disease, type 2 diabetes, alcohol abuse, who was recently hospitalized for abdominal pain from April 20-21.  Noted to have redness on his IV site in the right mid forearm today.  It is not painful.  There is no drainage.  No fever or chills.  Presents to the ER for evaluation.  Noted to be tachycardic in triage.  In the department he was tacky in the 140 range.  He is in A. fib with RVR.  He is on beta-blocker for this.  He has no chest pain or shortness of breath.  No other skin rash or lesions.  On exam he has a small reddened area noted in the photo above.   There is no fluctuance.  There is no discharge.  There is no lymphangitic spread.  EKG was obtained and showed A. fib with RVR.  This slowed into the 80s with 1 dose of Lopressor.  He remained in A. fib which is chronic for him.  He is on Coumadin and his INR is 2.23.  He had mild leukocytosis otherwise blood work is normal including thyroid and lactic acid.  He was placed on Keflex for early cellulitis.  There is no evidence of abscess.  Recommend hot pack to increase blood flow.  Information on chronic A. fib and cellulitis is provided.  Reasons to return to emergency room were discussed.  I have reviewed the nursing notes.    I have reviewed the findings, diagnosis, plan and need for follow up with the patient.       Discharge Medication List as of 4/24/2018 10:42 AM      START taking these medications    Details   cephALEXin (KEFLEX) 500 MG capsule Take 1 capsule (500 mg) by mouth 4 times daily for 7 days, Disp-28 capsule, R-0, E-Prescribe             Final diagnoses:   Chronic atrial fibrillation (H)   Right arm cellulitis       4/24/2018   [facility] EMERGENCY DEPARTMENT     Jose Kelly MD  04/24/18 1201       Jose Kelly MD  04/26/18 4961

## 2018-04-24 NOTE — ED AVS SNAPSHOT
[facility] Emergency Department    [address] [address]    Phone:  [phone]    Fax:  [phone]                                       Antoine Russo   MRN: [id number]    Department:  [facility] Emergency Department   Date of Visit:  4/24/2018           After Visit Summary Signature Page     I have received my discharge instructions, and my questions have been answered. I have discussed any challenges I see with this plan with the nurse or doctor.    ..........................................................................................................................................  Patient/Patient Representative Signature      ..........................................................................................................................................  Patient Representative Print Name and Relationship to Patient    ..................................................               ................................................  Date                                            Time    ..........................................................................................................................................  Reviewed by Signature/Title    ...................................................              ..............................................  Date                                                            Time

## 2018-04-24 NOTE — ED AVS SNAPSHOT
[facility] Emergency Department    [address]    [facility] [address]    Phone:  [phone]    Fax:  [phone]                                       Antoine Russo   MRN: [id number]    Department:  [facility] Emergency Department   Date of Visit:  4/24/2018           Patient Information     Date Of Birth          1945        Your diagnoses for this visit were:     Chronic atrial fibrillation (H)     Right arm cellulitis        You were seen by Jose Kelly MD.      Follow-up Information     Follow up with Katie Gross MD.    Specialty:  Family Practice    Why:  As needed    Contact information:    [address] [facility] [address]  [phone]        Discharge References/Attachments     ATRIAL FIBRILLATION, DISCHARGE INSTRUCTIONS FOR (ENGLISH)    SKIN INFECTION, CELLULITIS (ENGLISH)      Your next 10 appointments already scheduled     Apr 30, 2018  9:45 AM CDT   [facility] with [facility]    [address] [place] [id number] [phone]            May 07, 2018  8:20 AM CDT   (Arrive by 8:05 AM)   CT ABDOMEN PELVIS W/O & W CONTRAST with UCCT2   [facility] CT ([facility])    [address]  1st Floor  [facility] [address] [phone]           Please bring any scans or X-rays taken at other hospitals, if similar tests were done. Also bring a list of your medicines, including vitamins, minerals and over-the-counter drugs. It is safest to leave personal items at home.  Be sure to tell your doctor:   If you have any allergies.   If there s any chance you are pregnant.   If you are breastfeeding.  How to prepare:   Do not eat or drink for 2 hours before your exam. If you need to take medicine, you may take it with small sips of water. (We may ask you to take liquid medicine as well.)   Please wear loose clothing, such as a sweat suit or  jogging clothes. Avoid snaps, zippers and other metal. We may ask you to undress and put on a hospital gown.  Please arrive 30 minutes early for your CT. Once in the department you might be asked to drink water 15-20 minutes prior to your exam.  If indicated you may be asked to drink an oral contrast in advance of your CT.  If this is the case, the imaging team will let you know or be in contact with you prior to your appointment  Patients over 70 or patients with diabetes or kidney problems:   If you haven t had a blood test (creatinine test) within the last 30 days, the Cardiologist/Radiologist may require you to get this test prior to your exam.  If you have diabetes:   Continue to take your metformin medication on the day of your exam  If you have any questions, please call the Imaging Department where you will have your exam.            May 07, 2018 11:00 AM CDT   (Arrive by 10:45 AM)   Return Visit with Yovanny Beasley MD   [facility] ([facility])    [address] [place] [address] [phone]              24 Hour Appointment Hotline       To make an appointment at any [facility], call [phone]). If you don't have a family doctor or clinic, we will help you find one. [facility] clinics are conveniently located to serve the needs of you and your family.             Review of your medicines      START taking        Dose / Directions Last dose taken    cephALEXin 500 MG capsule   Commonly known as:  KEFLEX   Dose:  500 mg   Quantity:  28 capsule        Take 1 capsule (500 mg) by mouth 4 times daily for 7 days   Refills:  0          Our records show that you are taking the medicines listed below. If these are incorrect, please call your family doctor or clinic.        Dose / Directions Last dose taken    ASPIRIN NOT PRESCRIBED   Commonly known as:  INTENTIONAL        Antiplatelet medication not prescribed  intentionally due to Current anticoagulant therapy (warfarin/enoxaparin)   Refills:  0        gabapentin 300 MG capsule   Commonly known as:  NEURONTIN   Quantity:  180 capsule        Take 2 tablest (600 mg) every night   Refills:  1        glipiZIDE 2.5 MG 24 hr tablet   Commonly known as:  glipiZIDE XL   Dose:  2.5 mg   Quantity:  90 tablet        Take 1 tablet (2.5 mg) by mouth every morning   Refills:  3        HYDROmorphone 2 MG tablet   Commonly known as:  DILAUDID   Dose:  2 mg   Quantity:  10 tablet        Take 1 tablet (2 mg) by mouth every 3 hours as needed for moderate to severe pain   Refills:  0        lisinopril 10 MG tablet   Commonly known as:  PRINIVIL/ZESTRIL   Dose:  10 mg   Quantity:  90 tablet        Take 1 tablet (10 mg) by mouth daily   Refills:  1        metFORMIN 500 MG 24 hr tablet   Commonly known as:  GLUCOPHAGE-XR   Dose:  1000 mg   Quantity:  360 tablet        Take 2 tablets (1,000 mg) by mouth 2 times daily (with meals)   Refills:  1        metoprolol succinate 50 MG 24 hr tablet   Commonly known as:  TOPROL-XL   Dose:  50 mg   Quantity:  180 tablet        Take 1 tablet (50 mg) by mouth 2 times daily   Refills:  3        multivitamin, therapeutic with minerals Tabs tablet   Dose:  1 tablet   Quantity:  30 each        Take 1 tablet by mouth daily.   Refills:  1        tamsulosin 0.4 MG capsule   Commonly known as:  FLOMAX   Dose:  0.4 mg   Quantity:  90 capsule        Take 1 capsule (0.4 mg) by mouth daily   Refills:  3        * WARFARIN SODIUM PO   Dose:  2.5 mg   Indication:  Obstructing Blood Clot with Chronic Atrial Fibrillation        Take 2.5 mg by mouth Sun, Mon, Wed, Thurs, Fri   Refills:  0        * WARFARIN SODIUM PO   Dose:  1.25 mg   Indication:  Obstructing Blood Clot with Chronic Atrial Fibrillation        Take 1.25 mg by mouth Tues, Sat   Refills:  0        * Notice:  This list has 2 medication(s) that are the same as other medications prescribed for you. Read the  directions carefully, and ask your doctor or other care provider to review them with you.            Prescriptions were sent or printed at these locations (1 Prescription)                   [facility] [address] [place] [address] [address]   [address] [facility] [address]    Telephone:  [phone]   Fax:  [phone]   Hours:                  E-Prescribed (1 of 1)         cephALEXin (KEFLEX) 500 MG capsule                Procedures and tests performed during your visit     CBC with platelets differential    Comprehensive metabolic panel    EKG 12-lead, tracing only    INR    Lactic acid whole blood    Peripheral IV catheter    TSH with free T4 reflex    Troponin I      Orders Needing Specimen Collection     None      Pending Results     No orders found from 4/22/2018 to 4/25/2018.            Pending Culture Results     No orders found from 4/22/2018 to 4/25/2018.            Pending Results Instructions     If you had any lab results that were not finalized at the time of your Discharge, you can call the ED Lab Result RN at [phone]. You will be contacted by this team for any positive Lab results or changes in treatment. The nurses are available 7 days a week from 10A to 6:30P.  You can leave a message 24 hours per day and they will return your call.        Test Results From Your Hospital Stay        4/24/2018  9:51 AM      Component Results     Component Value Ref Range & Units Status    WBC 16.9 (H) 4.0 - 11.0 10e9/L Final    RBC Count 3.91 (L) 4.4 - 5.9 10e12/L Final    Hemoglobin 12.0 (L) 13.3 - 17.7 g/dL Final    Hematocrit 38.1 (L) 40.0 - 53.0 % Final    MCV 97 78 - 100 fl Final    MCH 30.7 26.5 - 33.0 pg Final    MCHC 31.5 31.5 - 36.5 g/dL Final    RDW 15.2 (H) 10.0 - 15.0 % Final    Platelet Count 410 150 - 450 10e9/L Final    Diff Method Automated Method  Final    % Neutrophils 78.0 % Final    % Lymphocytes 12.5 % Final    % Monocytes 8.5 % Final    % Eosinophils 0.2 % Final    %  Basophils 0.1 % Final    % Immature Granulocytes 0.7 % Final    Absolute Neutrophil 13.2 (H) 1.6 - 8.3 10e9/L Final    Absolute Lymphocytes 2.1 0.8 - 5.3 10e9/L Final    Absolute Monocytes 1.4 (H) 0.0 - 1.3 10e9/L Final    Absolute Eosinophils 0.0 0.0 - 0.7 10e9/L Final    Absolute Basophils 0.0 0.0 - 0.2 10e9/L Final    Abs Immature Granulocytes 0.1 0 - 0.4 10e9/L Final         4/24/2018  9:58 AM      Component Results     Component Value Ref Range & Units Status    INR 2.23 (H) 0.86 - 1.14 Final         4/24/2018 10:30 AM      Component Results     Component Value Ref Range & Units Status    Sodium 131 (L) 133 - 144 mmol/L Final    Potassium 4.8 3.4 - 5.3 mmol/L Final    Chloride 99 94 - 109 mmol/L Final    Carbon Dioxide 26 20 - 32 mmol/L Final    Anion Gap 6 3 - 14 mmol/L Final    Glucose 177 (H) 70 - 99 mg/dL Final    Urea Nitrogen 11 7 - 30 mg/dL Final    Creatinine 0.92 0.66 - 1.25 mg/dL Final    GFR Estimate 81 >60 mL/min/1.7m2 Final    Non  GFR Calc    GFR Estimate If Black >90 >60 mL/min/1.7m2 Final    African American GFR Calc    Calcium 9.1 8.5 - 10.1 mg/dL Final    Bilirubin Total 0.6 0.2 - 1.3 mg/dL Final    Albumin 2.4 (L) 3.4 - 5.0 g/dL Final    Protein Total 8.0 6.8 - 8.8 g/dL Final    Alkaline Phosphatase 95 40 - 150 U/L Final    ALT 11 0 - 70 U/L Final    AST 17 0 - 45 U/L Final         4/24/2018  9:48 AM      Component Results     Component Value Ref Range & Units Status    Lactic Acid 1.8 0.7 - 2.0 mmol/L Final         4/24/2018 10:08 AM      Component Results     Component Value Ref Range & Units Status    Troponin I ES <0.015 0.000 - 0.045 ug/L Final    The 99th percentile for upper reference range is 0.045 ug/L.  Troponin values   in the range of 0.045 - 0.120 ug/L may be associated with risks of adverse   clinical events.           4/24/2018 10:14 AM      Component Results     Component Value Ref Range & Units Status    TSH 1.12 0.40 - 4.00 mU/L Final                Thank you  for choosing [facility]       Thank you for choosing [facility] for your care. Our goal is always to provide you with excellent care. Hearing back from our patients is one way we can continue to improve our services. Please take a few minutes to complete the written survey that you may receive in the mail after you visit with us. Thank you!        [employer] Information     [employer] gives you secure access to your electronic health record. If you see a primary care provider, you can also send messages to your care team and make appointments. If you have questions, please call your primary care clinic.  If you do not have a primary care provider, please call [phone] and they will assist you.        Care EveryWhere ID     This is your Care EveryWhere ID. This could be used by other organizations to access your [facility] medical records  [id number]        Equal Access to Services     ABBEY WALTON : Raquel Bernstein, charli block, koffi adler. So [facility] [phone].    ATENCIÓN: Si habla español, tiene a snyder disposición servicios gratuitos de asistencia lingüística. Llame al [phone].    We comply with applicable federal civil rights laws and [place] laws. We do not discriminate on the basis of race, color, national origin, age, disability, sex, sexual orientation, or gender identity.            After Visit Summary       This is your record. Keep this with you and show to your community pharmacist(s) and doctor(s) at your next visit.

## 2018-04-24 NOTE — ED NOTES
Here for  Infection from IV site. discharged on  Saturday.  Noted to have RHR in triage, hx of a fib. Does not feel palpitations. denies chest pain. Takes warfarin. Feels SOA with exertion.

## 2018-04-24 NOTE — PROGRESS NOTES
Message left on voicemail for Antoine advising order for infusions was placed and REMOTVt message sent with instructions about how to use them.   Asked him to call with any questions/concerns.     Marianne BUSH RN Coordinator  Dr. Beasley, Dr. Parish & Dr. Rawls   [facility]  [phone]

## 2018-04-24 NOTE — ED NOTES
Patient states he is concerned about a sore on his right mid forearm. He states it is from an IV he had last week, the area is scabbed over, red and non-draining, He denies any fevers. He does however present with a HR in the 140's. HX of a-fib on coumadin

## 2018-04-25 ENCOUNTER — TELEPHONE (OUTPATIENT)
Dept: GASTROENTEROLOGY | Facility: CLINIC | Age: 73
End: 2018-04-25

## 2018-04-25 NOTE — TELEPHONE ENCOUNTER
Patient called triage line.  He was wondering why he was being set up for infusions.  Explained to patient that infusions  are started in case he has increased vomiting and abdominal pain to keep him hydrated and to keep him out of the hospital. Pt states he has no trouble keeping in fluids. But morales such as it is. He is cuurently admitted. They are also loking for the paperwork for his wive's FMLA. I gave them the correct fax number. Brigette Noguera

## 2018-04-25 NOTE — PROGRESS NOTES
"[facility]  Medical Nutrition Therapy    Visit Type: Initial Assessment    Antoine LOPEZ Rafaela referred by Dr. Gross for MNT related to Mild Malnutrition.    Nutrition Assessment:  Anthropometrics  Wt Readings from Last 10 Encounters:   04/24/18 81.6 kg (180 lb)   04/19/18 83.9 kg (185 lb)   04/17/18 83.2 kg (183 lb 6.4 oz)   04/16/18 82.4 kg (181 lb 9.6 oz)   04/02/18 92.4 kg (203 lb 9.6 oz)   03/30/18 90.7 kg (200 lb)   03/29/18 91.1 kg (200 lb 13.4 oz)   03/16/18 89.8 kg (198 lb)   03/07/18 90.6 kg (199 lb 11.2 oz)   02/28/18 87.3 kg (192 lb 7.4 oz)     Nutrition History  Patient has had multiple hospitalizations since Jan 2018 for pancreatitis. Here today due to significant weight loss and decline in appetite. Patient reports that he'd like to learn more about a low-fat diet; he states that he has not been educated on the details, but rather just told to follow the diet. Patient eats 1 - 2 meals/day. He is able to prepare some foods that are simple. Patient reports that his significant other does the food shopping. Patient drinks ~1 Boost Plus daily; he likes the shakes, but is concern regarding the vitamin K content. He has been working with INR clinic on his concerns. Patient shares that part of his decrease in oral intake is because he is not sure what to eat and does not want to \"make things worse\" by eating the wrong thing.     Physical Activity  Minimal due to current medical condidtions    Nutrition Prescription  Energy:  2098 - 2517 kcals/day       Protein:  84 - 101 gm/day          Fluid:  1 mL/kcal         Food Record  Breakfast: ham and cheese breakfast sandwich or cereal with 2% milk   Dinner: varies      Patient is a poor historian re: his typical food intake. Reports that he snacks on apples and Boost Plus shakes daily.       Nutrition Diagnosis:  Food and nutrition related knowledge deficit r/t no previous food and nutrition related education on low-fat diet as evidenced by " reports no previous food and nutrition related education on low-fat diet.     Nutrition Intervention:  -Discussed low-fat diet recommendations in great detail. Reviewed foods that are recommended vs not recommended. Brainstormed meal ideas that are quick, easy, and low-fat.   -Educated patient on label reading. Practiced reading several labels during appointment.   -Encouraged small, frequent meals due to poor appetite. Provided with a sample low-fat menu that includes several meal and snack ideas.   -Encouraged high-protein intake. Discussed foods that include protein.   -Recommended if poor intake persists despite increase in knowledge re: foods that he can eat, consider increasing supplementation. Recommend working with INR clinic due to vitamin K content.     Nutrition Goals:  -Small, frequent meals  -Continue current oral nutrition supplement  -Follow low-fat diet recommendations     Nutrition Follow Up / Monitoring:  Food and nutrition related knowledge; weight; meal/snack/beverage consumption; oral nutrition supplementation     Nutrition Recommendations:  Patient to follow-up with RD as needed.   Patient has RD contact information to call/email if needed.    Virginia Hubbard RDN, LD  Clinical Dietitian  [phone]    Start time 0830  End time 9002

## 2018-04-26 ENCOUNTER — PATIENT OUTREACH (OUTPATIENT)
Dept: CARE COORDINATION | Facility: CLINIC | Age: 73
End: 2018-04-26

## 2018-04-26 LAB
BACTERIA SPEC CULT: NO GROWTH
BACTERIA SPEC CULT: NO GROWTH
Lab: NORMAL
Lab: NORMAL
SPECIMEN SOURCE: NORMAL
SPECIMEN SOURCE: NORMAL

## 2018-04-26 NOTE — PROGRESS NOTES
Clinic Care Coordination Contact    OUTREACH    Referral Information:  Referral Source: ED Follow-Up         Chief Complaint   Patient presents with     ER F/U     Nurse: ER f/u 4/24/18 cellulitis and afib        Universal Utilization: Patient in the ER 4/24/18 with cellulitis and Afib .  Utilization    Last refreshed: 4/26/2018  9:48 AM:  No Show Count (past year) 1       Last refreshed: 4/26/2018  9:48 AM:  ED visits 2       Last refreshed: 4/26/2018  9:48 AM:  Hospital admissions 5          Current as of: 4/26/2018  9:48 AM             Clinical Concerns:  Current Medical Concerns:  RN CC spoke with patient, he states he is doing little better but not significant enough. He states he is upset with his wife and hospitalist for discharging him as early has he was discharged on 4/21/18 because he felt he was not ready but no one really listened to him. He states he realized how difficult its going to be to change the way he has been eating his entire life, to now he has to watch what he eats and the amount he eats. RN CC did give him some tips of eating small frequent meals every 2-3 hours, try something like the P3 which is Portable Protein Pack which is high in protein and can be bought at grocery stores or gas stations. RN CC recommended Hummus as its high in protein, he verbalized he just has to try new things and change his way to eating. He denies having any questions or concerns regarding his discharge instructions. He states he never even new his heart was beating that fast, he states he was asymptomatic but was sent to the ER by the dietician. RN CC helped him make an appointment to f/u with PCP for Monday 4/30/18 @ 11am.       Current Behavioral Concerns: No concerns at this time.      Education Provided to patient: RN CC educated about Care Coordination Services, discharge instructions, medications reviewed and follow up.      Clinical Pathway Name: None  Health Maintenance Reviewed: Due/Overdue    Health Maintenance Due   Topic Date Due     EYE EXAM Q1 YEAR  05/15/2015     TETANUS IMMUNIZATION (SYSTEM ASSIGNED)  11/03/2016     INFLUENZA VACCINE (1) 09/01/2017     COLONOSCOPY Q5 YR  12/18/2017         Medication Management:  Patient is independent in medication management. Pt. verbalized adherence and understanding of medication regimen, side effects, purposes.     Functional Status:  Mobility Status: Independent  Equipment Currently Used at Home: cane, straight, walker, rolling (currently not using assist devices, but has available)  Transportation: regular car        Psychosocial:  Current living arrangement:: I live in a private home with spouse  Type of residence:: Private home - stairs  Financial/Insurance: No concerns at this time.       Resources and Interventions:  Current Resources: n/a  Advanced Care Plans/Directives on file:: No        Goals:   Goals        General    Healthy Eating (pt-stated)     Notes - Note edited  4/26/2018 11:23 AM by Coreen Craig RN    Patient states he would like to gain knowledge and advice on proper diet recommendations for his given diagnosis; he is meeting with the dietician tomorrow to review this in detail.  RN CC will be available in the future if needed.     4/26/18: Patient saw the dietician and is trying to figure out what will work for him regarding his diet and increasing protein.                   Patient/Caregiver understanding: patient verbalized understanding of his instructions.  Outreach Frequency: monthly  Future Appointments              In 4 days NB ANTI COAG [facility]    In 4 days Katie Gross MD [facility]    In 1 week UCCT2 [facility] CT, [facility]    In 1 week Yovanny Beasley MD [facility], [facility]           Plan:   Patient will continue to follow treatment plan as directed and follow up with PCP with concerns ongoing.   Patient to attend his hospital and  ER f/u with PCP 4/30/18 @ 11am.  RN CC to f/u in one month.    Coreen Craig RN, [facility]  RN Care Coordinator  [facility] [place] [facility]  Phone # [phone]  4/26/2018 11:35 AM

## 2018-04-26 NOTE — TELEPHONE ENCOUNTER
Called Antoine and advised that he was ordered infusions per the message we received.   This is a patient had post ERCP pancreatitis, initial presentation 3/29 (Dr. Beasley's patient), had 2 admissions in this week for pain but both time improved after IV hydration, no fever or chills, tolerating PO.  Can you schedule him with infusion clinic follow up to get fluid infusion during pain episodes.     Thanks.   María Elena sher [phone]                He is advised that we order them for patients to prevent them from needing to go to the ED.   He understands but states he felt he was let go from the hospital too early. He is aware he can use the fluids when he has pain episodes.   He will call with further questions/concerns.     Marianne BUSH, RN Coordinator  Dr. Beasley, Dr. Parish & Dr. Rawls   [facility]  [phone]

## 2018-04-30 ENCOUNTER — ANTICOAGULATION THERAPY VISIT (OUTPATIENT)
Dept: ANTICOAGULATION | Facility: CLINIC | Age: 73
End: 2018-04-30
Payer: COMMERCIAL

## 2018-04-30 ENCOUNTER — OFFICE VISIT (OUTPATIENT)
Dept: FAMILY MEDICINE | Facility: CLINIC | Age: 73
End: 2018-04-30
Payer: COMMERCIAL

## 2018-04-30 VITALS
SYSTOLIC BLOOD PRESSURE: 120 MMHG | RESPIRATION RATE: 20 BRPM | DIASTOLIC BLOOD PRESSURE: 60 MMHG | WEIGHT: 172 LBS | TEMPERATURE: 97 F | OXYGEN SATURATION: 96 % | HEART RATE: 82 BPM | BODY MASS INDEX: 24.68 KG/M2

## 2018-04-30 DIAGNOSIS — Z79.01 LONG-TERM (CURRENT) USE OF ANTICOAGULANTS: ICD-10-CM

## 2018-04-30 DIAGNOSIS — E11.42 TYPE 2 DIABETES MELLITUS WITH DIABETIC POLYNEUROPATHY, WITHOUT LONG-TERM CURRENT USE OF INSULIN (H): Chronic | ICD-10-CM

## 2018-04-30 DIAGNOSIS — K85.91 NECROTIZING PANCREATITIS: Primary | ICD-10-CM

## 2018-04-30 DIAGNOSIS — I48.20 CHRONIC ATRIAL FIBRILLATION (H): ICD-10-CM

## 2018-04-30 DIAGNOSIS — I10 HYPERTENSION, BENIGN ESSENTIAL, GOAL BELOW 140/90: Chronic | ICD-10-CM

## 2018-04-30 LAB — INR POINT OF CARE: 4.8 (ref 0.86–1.14)

## 2018-04-30 PROCEDURE — 99207 ZZC NO CHARGE NURSE ONLY: CPT

## 2018-04-30 PROCEDURE — 36416 COLLJ CAPILLARY BLOOD SPEC: CPT

## 2018-04-30 PROCEDURE — 85610 PROTHROMBIN TIME: CPT | Mod: QW

## 2018-04-30 PROCEDURE — 99495 TRANSJ CARE MGMT MOD F2F 14D: CPT | Performed by: FAMILY MEDICINE

## 2018-04-30 NOTE — PROGRESS NOTES
"  ANTICOAGULATION FOLLOW-UP CLINIC VISIT    Patient Name:  Antoine Russo  Date:  4/30/2018  Contact Type:  Face to Face    SUBJECTIVE:     Patient Findings     Positives Antibiotic use or infection    Comments (KEFLEX) 500 MG capsule Take 1 capsule (500 mg) by mouth 4 times daily for 7 days, Disp-28 capsule Prescribed for cellulitis at previous IV site Seen in ED last week- (Concurrent use of CEPHALEXIN and WARFARIN may result in an increased risk of bleeding)  Patient denies signs and symptoms of bleeding. He was educated regarding what signs and symptoms to look for and also instructed when seek immediate medical attention. Poli verbalized understanding. He states he still is feeling like \"death\" and he is struggling to eat. He will try to drink a boost today. He is moving slow this am, he will see PCP at 11:00.  He was instructed to hold warfarin today and then take 1.25mg daily until Thursday's INR recheck. He verbalizes understanding and agrees to plan. No further questions or concerns.                       OBJECTIVE    INR Protime   Date Value Ref Range Status   04/30/2018 4.8 (A) 0.86 - 1.14 Final       ASSESSMENT / PLAN  INR assessment SUPRA    Recheck INR In: 4 DAYS    INR Location Clinic      Anticoagulation Summary as of 4/30/2018     INR goal 2.0-3.0   Today's INR 4.8!   Maintenance plan 1.25 mg (2.5 mg x 0.5) on Tue, Sat; 2.5 mg (2.5 mg x 1) all other days   Full instructions 4/30: Hold; 5/2: 1.25 mg; Otherwise 1.25 mg on Tue, Sat; 2.5 mg all other days   Weekly total 15 mg   Plan last modified Aparna Kunz, RN (3/8/2018)   Next INR check 5/3/2018   Priority INR   Target end date Indefinite    Indications   Chronic atrial fibrillation (H) [I48.2]  Long-term (current) use of anticoagulants [Z79.01] [Z79.01]         Anticoagulation Episode Summary     INR check location     Preferred lab     Send INR reminders to [facility] CLINIC POOL    Comments *       Anticoagulation Care Providers     " Provider Role Specialty Phone number    Troy Long, Katie Okeefe MD [facility] [phone]            See the Encounter Report to view Anticoagulation Flowsheet and Dosing Calendar (Go to Encounters tab in chart review, and find the Anticoagulation Therapy Visit)        Doris Gallardo RN

## 2018-04-30 NOTE — PATIENT INSTRUCTIONS
Hold the glipizide for now    Increase the metoprolol to 75 mg twice a day (1 and 1/2)    Try eating small frequent bites  [place] instant breakfast    See me back in 1-2 weeks

## 2018-04-30 NOTE — MR AVS SNAPSHOT
After Visit Summary   4/30/2018    Antoine Russo    MRN: [id number]           Patient Information     Date Of Birth          1945        Visit Information        Provider Department      4/30/2018 11:00 AM Katie Gross MD [facility]        Today's Diagnoses     Necrotizing pancreatitis    -  1    Chronic atrial fibrillation (H)        Type 2 diabetes mellitus with diabetic polyneuropathy, without long-term current use of insulin (H)        Hypertension, benign essential, goal below 140/90          Care Instructions    Hold the glipizide for now    Increase the metoprolol to 75 mg twice a day (1 and 1/2)    Try eating small frequent bites  [place] instant breakfast    See me back in 1-2 weeks          Follow-ups after your visit        Your next 10 appointments already scheduled     May 03, 2018  9:15 AM CDT   Anticoagulation Visit with NB ANTI COAG   [facility]    [address] [place] [address]   [phone]            May 07, 2018  8:20 AM CDT   (Arrive by 8:05 AM)   CT ABDOMEN PELVIS W/O & W CONTRAST with UCCT2   [facility] CT ([facility])    [address] [facility] [address]  1st Floor  [facility] [address] [phone]           Please bring any scans or X-rays taken at other hospitals, if similar tests were done. Also bring a list of your medicines, including vitamins, minerals and over-the-counter drugs. It is safest to leave personal items at home.  Be sure to tell your doctor:   If you have any allergies.   If there s any chance you are pregnant.   If you are breastfeeding.  How to prepare:   Do not eat or drink for 2 hours before your exam. If you need to take medicine, you may take it with small sips of water. (We may ask you to take liquid medicine as well.)   Please wear loose clothing, such as a sweat suit or jogging clothes. Avoid snaps, zippers and other  metal. We may ask you to undress and put on a hospital gown.  Please arrive 30 minutes early for your CT. Once in the department you might be asked to drink water 15-20 minutes prior to your exam.  If indicated you may be asked to drink an oral contrast in advance of your CT.  If this is the case, the imaging team will let you know or be in contact with you prior to your appointment  Patients over 70 or patients with diabetes or kidney problems:   If you haven t had a blood test (creatinine test) within the last 30 days, the Cardiologist/Radiologist may require you to get this test prior to your exam.  If you have diabetes:   Continue to take your metformin medication on the day of your exam  If you have any questions, please call the Imaging Department where you will have your exam.            May 07, 2018 11:00 AM CDT   (Arrive by 10:45 AM)   Return Visit with Yovanny Beasley MD   [facility] ([facility])    [address] [place] [address] [phone]              Future tests that were ordered for you today     Open Future Orders        Priority Expected Expires Ordered    Pancreatic Elastase Fecal Routine  4/30/2019 4/30/2018            Who to contact     If you have questions or need follow up information about today's clinic visit or your schedule please contact [facility] directly at [phone].  Normal or non-critical lab and imaging results will be communicated to you by MyChart, letter or phone within 4 business days after the clinic has received the results. If you do not hear from us within 7 days, please contact the clinic through MyChart or phone. If you have a critical or abnormal lab result, we will notify you by phone as soon as possible.  Submit refill requests through Jini or call your pharmacy and they will forward the refill request to us. Please allow 3 business days for your refill to be  completed.          Additional Information About Your Visit        9Cookieshart Information     Bluetest gives you secure access to your electronic health record. If you see a primary care provider, you can also send messages to your care team and make appointments. If you have questions, please call your primary care clinic.  If you do not have a primary care provider, please call [phone] and they will assist you.        Care EveryWhere ID     This is your Care EveryWhere ID. This could be used by other organizations to access your [facility] medical records  [id number]        Your Vitals Were     Pulse Temperature Respirations Pulse Oximetry BMI (Body Mass Index)       82 97  F (36.1  C) (Tympanic) 20 96% 24.68 kg/m2        Blood Pressure from Last 3 Encounters:   04/30/18 120/60   04/24/18 115/54   04/21/18 100/60    Weight from Last 3 Encounters:   04/30/18 172 lb (78 kg)   04/24/18 180 lb (81.6 kg)   04/19/18 185 lb (83.9 kg)                 Today's Medication Changes          These changes are accurate as of 4/30/18 11:56 AM.  If you have any questions, ask your nurse or doctor.               These medicines have changed or have updated prescriptions.        Dose/Directions    gabapentin 300 MG capsule   Commonly known as:  NEURONTIN   This may have changed:    - how much to take  - how to take this  - when to take this  - additional instructions   Used for:  Type 2 diabetes mellitus with diabetic polyneuropathy, without long-term current use of insulin (H)        Take 2 tablest (600 mg) every night   Quantity:  180 capsule   Refills:  1                Primary Care Provider Office Phone # Fax #    MichelleSary Gross [phone]       [address] [place] [address]        Goals        General    Healthy Eating (pt-stated)     Notes - Note edited  4/26/2018 11:23 AM by Coreen Craig RN    Patient states he would like to gain knowledge and advice on proper diet recommendations for his given  diagnosis; he is meeting with the dietician tomorrow to review this in detail.  RN CC will be available in the future if needed.     4/26/18: Patient saw the dietician and is trying to figure out what will work for him regarding his diet and increasing protein.             Equal Access to Services     ABBEY WALTON : Raquel Bernstein, wafarzad langadaha, qamariselta kaalmadae matthews, koffi fay. So [facility] [phone].    ATENCIÓN: Si habla español, tiene a snyder disposición servicios gratuitos de asistencia lingüística. Llame al [phone].    We comply with applicable federal civil rights laws and [place] laws. We do not discriminate on the basis of race, color, national origin, age, disability, sex, sexual orientation, or gender identity.            Thank you!     Thank you for choosing [facility]  for your care. Our goal is always to provide you with excellent care. Hearing back from our patients is one way we can continue to improve our services. Please take a few minutes to complete the written survey that you may receive in the mail after your visit with us. Thank you!             Your Updated Medication List - Protect others around you: Learn how to safely use, store and throw away your medicines at www.disposemymeds.org.          This list is accurate as of 4/30/18 11:56 AM.  Always use your most recent med list.                   Brand Name Dispense Instructions for use Diagnosis    ASPIRIN NOT PRESCRIBED    INTENTIONAL     Antiplatelet medication not prescribed intentionally due to Current anticoagulant therapy (warfarin/enoxaparin)        cephALEXin 500 MG capsule    KEFLEX    28 capsule    Take 1 capsule (500 mg) by mouth 4 times daily for 7 days        gabapentin 300 MG capsule    NEURONTIN    180 capsule    Take 2 tablest (600 mg) every night    Type 2 diabetes mellitus with diabetic polyneuropathy, without long-term current use of insulin (H)        glipiZIDE 2.5 MG 24 hr tablet    glipiZIDE XL    90 tablet    Take 1 tablet (2.5 mg) by mouth every morning    Type 2 diabetes mellitus with diabetic polyneuropathy, without long-term current use of insulin (H)       HYDROmorphone 2 MG tablet    DILAUDID    10 tablet    Take 1 tablet (2 mg) by mouth every 3 hours as needed for moderate to severe pain    Acute recurrent pancreatitis       lisinopril 10 MG tablet    PRINIVIL/ZESTRIL    90 tablet    Take 1 tablet (10 mg) by mouth daily    Persistent proteinuria       metFORMIN 500 MG 24 hr tablet    GLUCOPHAGE-XR    360 tablet    Take 2 tablets (1,000 mg) by mouth 2 times daily (with meals)    Type 2 diabetes mellitus with diabetic polyneuropathy, without long-term current use of insulin (H)       metoprolol succinate 50 MG 24 hr tablet    TOPROL-XL    180 tablet    Take 1 tablet (50 mg) by mouth 2 times daily    Hypertension, benign essential, goal below 140/90       multivitamin, therapeutic with minerals Tabs tablet     30 each    Take 1 tablet by mouth daily.    Surgery aftercare       tamsulosin 0.4 MG capsule    FLOMAX    90 capsule    Take 1 capsule (0.4 mg) by mouth daily    Urgency of urination, Hypertrophy of prostate without urinary obstruction       * WARFARIN SODIUM PO      Take 2.5 mg by mouth Sun, Mon, Wed, Thurs, Fri        * WARFARIN SODIUM PO      Take 1.25 mg by mouth Tues, Sat        * Notice:  This list has 2 medication(s) that are the same as other medications prescribed for you. Read the directions carefully, and ask your doctor or other care provider to review them with you.

## 2018-04-30 NOTE — MR AVS SNAPSHOT
Antoine Russo   4/30/2018 9:45 AM   Anticoagulation Therapy Visit    Description:  72 year old male   Provider:  NB ANTI COAG   Department:  Nb Anticoag           INR as of 4/30/2018     Today's INR 4.8!      Anticoagulation Summary as of 4/30/2018     INR goal 2.0-3.0   Today's INR 4.8!   Full instructions 4/30: Hold; 5/2: 1.25 mg; Otherwise 1.25 mg on Tue, Sat; 2.5 mg all other days   Next INR check 5/3/2018    Indications   Chronic atrial fibrillation (H) [I48.2]  Long-term (current) use of anticoagulants [Z79.01] [Z79.01]         Your next Anticoagulation Clinic appointment(s)     May 03, 2018  9:15 AM CDT   Anticoagulation Visit with NB ANTI COAG   [facility] ([facility])    [address] [place] [address] [phone]              Contact Numbers     Please call [phone] with any problems or questions regarding your therapy.    If you need to cancel and/or reschedule your appointment please call one of the following numbers:  [facility] - [phone]  [facility] [phone]  [facility] [phone]  [facility] [phone]  [place] - [phone]            April 2018 Details    Sun Mon Tue Wed Thu Fri Sat     1               2               3               4               5               6               7                 8               9               10               11               12               13               14                 15               16               17               18               19               20               21                 22               23               24               25               26               27               28                 29               30      Hold   See details            Date Details   04/30 This INR check               How to take your warfarin dose     Hold Do not take your warfarin dose. See the Details table to the right for additional instructions.                May 2018  Details    Sun Mon Tue Wed Thu Fri Sat       1      1.25 mg         2      1.25 mg         3            4               5                 6               7               8               9               10               11               12                 13               14               15               16               17               18               19                 20               21               22               23               24               25               26                 27               28               29               30               31                  Date Details   No additional details    Date of next INR:  5/3/2018         How to take your warfarin dose     To take:  1.25 mg Take 0.5 of a 2.5 mg tablet.    To take:  2.5 mg Take 1 of the 2.5 mg tablets.

## 2018-04-30 NOTE — NURSING NOTE
"Chief Complaint   Patient presents with     ER F/U     cellulitis and A- Fib       Initial /60 (BP Location: Right arm)  Pulse 82  Temp 97  F (36.1  C) (Tympanic)  Resp 20  Wt 172 lb (78 kg)  SpO2 96%  BMI 24.68 kg/m2 Estimated body mass index is 24.68 kg/(m^2) as calculated from the following:    Height as of 4/24/18: 5' 10\" (1.778 m).    Weight as of this encounter: 172 lb (78 kg).      Health Maintenance that is potentially due pending provider review:  NONE    n/a    Is there anyone who you would like to be able to receive your results? No  If yes have patient fill out VIDYA    "

## 2018-04-30 NOTE — PROGRESS NOTES
SUBJECTIVE:   Antoine Russo is a 72 year old male who presents to clinic today for the following health issues:          Hospital Follow-up Visit:    Hospital/Nursing Home/IP Rehab Facility: [facility]  Date of Admission: 4/20/18  Date of Discharge: 4/21/18  Reason(s) for Admission: necrotizing pancreatitis            Problems taking medications regularly:  None       Medication changes since discharge: None       Problems adhering to non-medication therapy:  None    Summary of hospitalization:  [facility] discharge summary reviewed  Diagnostic Tests/Treatments reviewed.  Follow up needed: Fecal elastase, needs CT scan  Other Healthcare Providers Involved in Patient s Care: GI  Update since discharge: fluctuating course.     Post Discharge Medication Reconciliation: discharge medications reconciled, continue medications without change.  Plan of care communicated with patient     Coding guidelines for this visit:  Type of Medical   Decision Making Face-to-Face Visit       within 7 Days of discharge Face-to-Face Visit        within 14 days of discharge   Moderate Complexity [id number]   High Complexity [id number]          ED/UC Followup:    Facility:  [facility]  Date of visit: 4/24/2018  Reason for visit: cellulitis and A- fib  Current Status: fatigue and winded       Hospitalized with necrotizing pancreatitis, then once discharged he returned with a fib with AVF and early cellulits.   Was put on antibiotic (keflex). Given lopressor, and things slowed down.     He is feeling very weak and can't eat much. Is hungry but after 2 bites is full. Has been NPO a lot. Feeling a little sweaty and weak right now  Saw the nutritionist in [place] last week.   Has been taking one Ensure daily.     Wt Readings from Last 5 Encounters:   04/30/18 172 lb (78 kg)   04/24/18 180 lb (81.6 kg)   04/19/18 185 lb (83.9 kg)   04/17/18 183 lb 6.4 oz (83.2 kg)   04/16/18 181 lb 9.6 oz (82.4 kg)        Problem  list and histories reviewed & adjusted, as indicated.  Additional history: as documented    BP Readings from Last 3 Encounters:   04/30/18 120/60   04/24/18 115/54   04/21/18 100/60    Wt Readings from Last 3 Encounters:   04/30/18 172 lb (78 kg)   04/24/18 180 lb (81.6 kg)   04/19/18 185 lb (83.9 kg)            Diabetes-on metformin and glipizide  Lab Results   Component Value Date    A1C 7.7 03/16/2018    A1C 8.1 01/23/2018    A1C 7.0 10/10/2017    A1C 6.4 07/13/2017    A1C 8.9 04/04/2017              Reviewed and updated as needed this visit by clinical staff  Tobacco  Allergies  Meds  Med Hx  Surg Hx  Fam Hx  Soc Hx      Reviewed and updated as needed this visit by Provider         Problem list and histories reviewed & adjusted, as indicated.  Additional history: as documented    BP Readings from Last 3 Encounters:   04/30/18 120/60   04/24/18 115/54   04/21/18 100/60    Wt Readings from Last 3 Encounters:   04/30/18 172 lb (78 kg)   04/24/18 180 lb (81.6 kg)   04/19/18 185 lb (83.9 kg)                    Reviewed and updated as needed this visit by clinical staff  Tobacco  Allergies  Meds  Med Hx  Surg Hx  Fam Hx  Soc Hx      Reviewed and updated as needed this visit by Provider         ROS:  CONSTITUTIONAL: NEGATIVE for fever, chills, positive for weight loss  ENT/MOUTH: NEGATIVE for ear, mouth and throat problems  RESP: NEGATIVE for significant cough or SOB  CV: NEGATIVE for chest pain, palpitations or peripheral edema  GI: positive as above  : negative for dysuria, hematuria, decreased urinary stream    OBJECTIVE:                                                    /60 (BP Location: Right arm)  Pulse 82  Temp 97  F (36.1  C) (Tympanic)  Resp 20  Wt 172 lb (78 kg)  SpO2 96%  BMI 24.68 kg/m2  Body mass index is 24.68 kg/(m^2).  GENERAL: appears mildly ill, alert,, no distress  HENT: Mouth- no ulcers, no lesions  NECK: no tenderness, no adenopathy, no asymmetry, no masses, no stiffness;  thyroid- normal to palpation  RESP: lungs clear to auscultation - no rales, no rhonchi, no wheezes  CV: irregularly irregular with about 130 heart rate, normal S1 S2, no S3 or S4 and no murmur, no click or rub -  ABDOMEN: soft, no tenderness, no  hepatosplenomegaly, no masses, multiple scars  MS: extremities- no gross deformities noted, no edema  Skin: small scab on right medial forearm well healing     ASSESSMENT/PLAN:                                                      ASSESSMENT:  1. Necrotizing pancreatitis    2. Chronic atrial fibrillation (H)    3. Type 2 diabetes mellitus with diabetic polyneuropathy, without long-term current use of insulin (H)    4. Hypertension, benign essential, goal below 140/90        PLAN:  Orders Placed This Encounter     Pancreatic Elastase Fecal     Patient given some crackers  I think he is getting hypoglycemic    Patient Instructions   Hold the glipizide for now    Increase the metoprolol to 75 mg twice a day (1 and 1/2)    Try eating small frequent bites  [place] instant breakfast    See me back in 1-2 weeks     Katie Gross MD  [facility]

## 2018-05-03 ENCOUNTER — TELEPHONE (OUTPATIENT)
Dept: GASTROENTEROLOGY | Facility: CLINIC | Age: 73
End: 2018-05-03

## 2018-05-03 ENCOUNTER — ANTICOAGULATION THERAPY VISIT (OUTPATIENT)
Dept: ANTICOAGULATION | Facility: CLINIC | Age: 73
End: 2018-05-03
Payer: COMMERCIAL

## 2018-05-03 DIAGNOSIS — Z79.01 LONG-TERM (CURRENT) USE OF ANTICOAGULANTS: ICD-10-CM

## 2018-05-03 DIAGNOSIS — I48.20 CHRONIC ATRIAL FIBRILLATION (H): ICD-10-CM

## 2018-05-03 LAB — INR POINT OF CARE: 4.7 (ref 0.86–1.14)

## 2018-05-03 PROCEDURE — 99207 ZZC NO CHARGE NURSE ONLY: CPT

## 2018-05-03 PROCEDURE — 36416 COLLJ CAPILLARY BLOOD SPEC: CPT

## 2018-05-03 PROCEDURE — 85610 PROTHROMBIN TIME: CPT | Mod: QW

## 2018-05-03 NOTE — PATIENT INSTRUCTIONS
Sources of Protein  Below is a list of high-protein foods. Your goal is ______ grams of protein a day.  Type of food Amount Grams protein Calorie level   Animal protein foods      Chicken (white, packed in water) 1 ounce 9 g Low   Turkey (light meat, no skin) 1 ounce 9 g Low   Ham (lean, 5%) 1 ounce 8 g Low   Venison, roasted 1 ounce 8 g Low   Tuna (white, packed in water) 1 ounce 8 g Low   Chicken (light meat, no skin) 1 ounce 8 g Low   Fish (flounder, emelyn, halibut, cod) 1 ounce 7 to 8 g Low   Crab, steamed 1 ounce 7 g Low   Pork tenderloin, lean roasted 1 ounce 6 g Low   Lobster, steamed 1 ounce 6 g Low   Luncheon meat (turkey, ham, chicken) 1 ounce 5 g Low   Imitation seafood (such as crabmeat) 1 ounce 4 g Low   Cooked shrimp or scallops 1 ounce 6 g Low   Liquid egg substitute 1/2 cup 10 to 12 g Medium   Steak (sirloin, trimmed) 1 ounce 10 g Medium   Chicken leg (no skin) 1 ounce 8 g Medium   Pork loin chop 1 ounce 8 g Medium   Veal loin, lean roasted 1 ounce 8 g Medium   Fatty fish (salmon, bluefish, etc.) 1 ounce 7 to 8 g Medium   Hamburger, medium 1 ounce 7 g Medium   Scrambled egg 1 large 6 g Medium   Egg white 1 large 6 g Low   Type of food Amount Grams protein Calorie level   Dairy products      Cottage cheese 1/4 cup 7 g Low   American cheese (skim, 1%) 1 ounce 6 g Low   Milk (skim, 1%) 1/2 cup 4 g Low   Dry skim milk powder 2 tbsp 4 g Low   Parmesan cheese, grated 1 tbsp 2 g Low   Mozzarella cheese (part skim) 1 ounce 8 g Medium   Ricotta cheese (part skim) 1/4 cup 7 g Medium   [facility] Instant Breakfast (made with milk, skim or 1%) 1/2 cup 6.5 g Medium   Pudding 1/2 cup 4 g Medium   Yogurt 1/2 cup 5 g Medium   Greek yogurt 1 cup 12 g Medium   Vegetarian      Soy protein, texturized 1/4 cup 6 g Low   Tofu 1/4 cup 5 g Low   Soymilk, plain 1/2 cup 3 g Low   Tempeh 1/4 cup 8 g Medium   Lentils 1/4 cup 5 g Medium   Kidney beans, black beans, fat-free refried beans (vegetarian) 1/4 cup 4 g Medium    Chickpeas, canned 1/4 cup 4 g Medium   Baked beans, canned 1/4 cup 3 g Medium   Veggie burger (garden burger) 1 real 7 g High   Soynuts 1/4 cup 17 g Very high   Sunflower seeds, no shells, dry roasted 2 tbsp 8 g Very high   Peanuts, dry roasted 1 ounce 7 g Very high   Almonds 15 6 g Very high   Pistachios 25 6 g Very high   Peanut/almond butter 2 tbsp 8 g Very high        For informational purposes only. Not to replace the advice of [facility] health care provider.  Copyright   2007 [facility]. All rights reserved. [employer] [id number]   01/16.

## 2018-05-03 NOTE — PROGRESS NOTES
ANTICOAGULATION FOLLOW-UP CLINIC VISIT    Patient Name:  Antoine Russo  Date:  5/3/2018  Contact Type:  Face to Face    SUBJECTIVE:     Patient Findings     Positives Change in diet/appetite (not eating well and mostly small meals or snacking), Antibiotic use or infection (finished keflex a couple days ago for cellulitis in right forearm), Intentional hold of therapy (4-30-18)    Comments Patient saw Dr. Gross and was told to drink [place] instant breakfast daily so he has been doing that instead of Ensure/Boost. His albumin levels were 2.4 when checked on 4-24-18. He is not eating much at all and has seen a dietician who told him to increase his protein, as did Dr. Gross. He does have chronic diarrhea, which has not changed. No signs of bleeding. He has been finished with keflex for two days and his arm is not red, swollen or warm. It does not appear infected. Writer suspects low albumin level and diet are the main cause of supra therapeutic INR at this point. INR remains elevated despite a hold and reduction in dosing. Writer will hold for 2 days and continue reduced dosing but he will need new tablet strength, if INR remains high, as we cannot reduce more than 1.25 mg daily. He states he cannot drink more than one [place] instant breakfast daily because then he will not have any appetite for other foods.            OBJECTIVE    INR Protime   Date Value Ref Range Status   05/03/2018 4.7 (A) 0.86 - 1.14 Final       ASSESSMENT / PLAN  INR assessment SUPRA    Recheck INR In: 4 DAYS    INR Location Clinic      Anticoagulation Summary as of 5/3/2018     INR goal 2.0-3.0   Today's INR 4.7!   Maintenance plan 1.25 mg (2.5 mg x 0.5) on Tue, Sat; 2.5 mg (2.5 mg x 1) all other days   Full instructions 5/3: Hold; 5/4: Hold; 5/6: 1.25 mg; Otherwise 1.25 mg on Tue, Sat; 2.5 mg all other days   Weekly total 15 mg   Plan last modified Aparna Kunz RN (3/8/2018)   Next INR check 5/7/2018   Priority INR   Target  end date Indefinite    Indications   Chronic atrial fibrillation (H) [I48.2]  Long-term (current) use of anticoagulants [Z79.01] [Z79.01]         Anticoagulation Episode Summary     INR check location     Preferred lab     Send INR reminders to [facility] ANTICOAG CLINIC POOL    Comments * chronic diarrhea      Anticoagulation Care Providers     Provider Role Specialty Phone number    Katie Gross MD [facility] [phone]            See the Encounter Report to view Anticoagulation Flowsheet and Dosing Calendar (Go to Encounters tab in chart review, and find the Anticoagulation Therapy Visit)        Aparna Kunz RN

## 2018-05-03 NOTE — TELEPHONE ENCOUNTER
[facility] Call Center    Phone Message    May a detailed message be left on voicemail: yes    Reason for Call: Other: Patient's wife Amina called again to verify if the clinic received the correct paperwork from [place].    Action Taken: Message routed to:  [facility] PANC

## 2018-05-03 NOTE — TELEPHONE ENCOUNTER
[facility] Call Center    Phone Message    May a detailed message be left on voicemail: yes    Reason for Call: Other: Patient's wife Amina called wanting to know if the clinic has received forms from [place] yet, they were suppose to be sent yesterday. It was regarding correction of patient's last name and date of birth.      Action Taken: Message routed to:  [facility]

## 2018-05-03 NOTE — MR AVS SNAPSHOT
Antoine Rsuso   5/3/2018 9:15 AM   Anticoagulation Therapy Visit    Description:  72 year old male   Provider:  [facility]   Department:  [facility]           INR as of 5/3/2018     Today's INR 4.7!      Anticoagulation Summary as of 5/3/2018     INR goal 2.0-3.0   Today's INR 4.7!   Full instructions 5/3: Hold; 5/4: Hold; 5/6: 1.25 mg; Otherwise 1.25 mg on Tue, Sat; 2.5 mg all other days   Next INR check 5/7/2018    Indications   Chronic atrial fibrillation (H) [I48.2]  Long-term (current) use of anticoagulants [Z79.01] [Z79.01]         Description     Try to increase your protein      Instructions    Sources of Protein  Below is a list of high-protein foods. Your goal is ______ grams of protein a day.  Type of food Amount Grams protein Calorie level   Animal protein foods      Chicken (white, packed in water) 1 ounce 9 g Low   Turkey (light meat, no skin) 1 ounce 9 g Low   Ham (lean, 5%) 1 ounce 8 g Low   Venison, roasted 1 ounce 8 g Low   Tuna (white, packed in water) 1 ounce 8 g Low   Chicken (light meat, no skin) 1 ounce 8 g Low   Fish (flounder, emelyn, halibut, cod) 1 ounce 7 to 8 g Low   Crab, steamed 1 ounce 7 g Low   Pork tenderloin, lean roasted 1 ounce 6 g Low   Lobster, steamed 1 ounce 6 g Low   Luncheon meat (turkey, ham, chicken) 1 ounce 5 g Low   Imitation seafood (such as crabmeat) 1 ounce 4 g Low   Cooked shrimp or scallops 1 ounce 6 g Low   Liquid egg substitute 1/2 cup 10 to 12 g Medium   Steak (sirloin, trimmed) 1 ounce 10 g Medium   Chicken leg (no skin) 1 ounce 8 g Medium   Pork loin chop 1 ounce 8 g Medium   Veal loin, lean roasted 1 ounce 8 g Medium   Fatty fish (salmon, bluefish, etc.) 1 ounce 7 to 8 g Medium   Hamburger, medium 1 ounce 7 g Medium   Scrambled egg 1 large 6 g Medium   Egg white 1 large 6 g Low   Type of food Amount Grams protein Calorie level   Dairy products      Cottage cheese 1/4 cup 7 g Low   American cheese (skim, 1%) 1 ounce 6 g Low   Milk (skim, 1%) 1/2  cup 4 g Low   Dry skim milk powder 2 tbsp 4 g Low   Parmesan cheese, grated 1 tbsp 2 g Low   Mozzarella cheese (part skim) 1 ounce 8 g Medium   Ricotta cheese (part skim) 1/4 cup 7 g Medium   [place] Instant Breakfast (made with milk, skim or 1%) 1/2 cup 6.5 g Medium   Pudding 1/2 cup 4 g Medium   Yogurt 1/2 cup 5 g Medium   Greek yogurt 1 cup 12 g Medium   Vegetarian      Soy protein, texturized 1/4 cup 6 g Low   Tofu 1/4 cup 5 g Low   Soymilk, plain 1/2 cup 3 g Low   Tempeh 1/4 cup 8 g Medium   Lentils 1/4 cup 5 g Medium   Kidney beans, black beans, fat-free refried beans (vegetarian) 1/4 cup 4 g Medium   Chickpeas, canned 1/4 cup 4 g Medium   Baked beans, canned 1/4 cup 3 g Medium   Veggie burger (garden burger) 1 real 7 g High   Soynuts 1/4 cup 17 g Very high   Sunflower seeds, no shells, dry roasted 2 tbsp 8 g Very high   Peanuts, dry roasted 1 ounce 7 g Very high   Almonds 15 6 g Very high   Pistachios 25 6 g Very high   Peanut/almond butter 2 tbsp 8 g Very high        For informational purposes only. Not to replace the advice of [facility] health care provider.  Copyright   2007 [facility]. All rights reserved. 250ok [id number] - 01/16.           Your next Anticoagulation Clinic appointment(s)     May 07, 2018  1:00 PM CDT   Anticoagulation Visit with [facility] ([facility])    [address] [address] [phone]              Contact Numbers     Please call [phone] with any problems or questions regarding your therapy.    If you need to cancel and/or reschedule your appointment please call one of the following numbers:  [facility] - [phone]  [place] - [phone]  [place] - [phone]  hospitals [phone]  [place] - [phone]            May 2018 Details    Sun Mon Tue Wed Thu Fri Sat       1               2               3      Hold   See details      4      Hold         5      1.25 mg           6       1.25 mg         7            8               9               10               11               12                 13               14               15               16               17               18               19                 20               21               22               23               24               25               26                 27               28               29               30               31                  Date Details   05/03 This INR check       Date of next INR:  5/7/2018         How to take your warfarin dose     To take:  1.25 mg Take 0.5 of a 2.5 mg tablet.    To take:  2.5 mg Take 1 of the 2.5 mg tablets.    Hold Do not take your warfarin dose. See the Details table to the right for additional instructions.

## 2018-05-07 ENCOUNTER — ANTICOAGULATION THERAPY VISIT (OUTPATIENT)
Dept: ANTICOAGULATION | Facility: CLINIC | Age: 73
End: 2018-05-07
Payer: COMMERCIAL

## 2018-05-07 ENCOUNTER — OFFICE VISIT (OUTPATIENT)
Dept: GASTROENTEROLOGY | Facility: CLINIC | Age: 73
End: 2018-05-07
Payer: COMMERCIAL

## 2018-05-07 ENCOUNTER — RADIANT APPOINTMENT (OUTPATIENT)
Dept: CT IMAGING | Facility: CLINIC | Age: 73
End: 2018-05-07
Attending: INTERNAL MEDICINE
Payer: COMMERCIAL

## 2018-05-07 VITALS
SYSTOLIC BLOOD PRESSURE: 98 MMHG | OXYGEN SATURATION: 100 % | DIASTOLIC BLOOD PRESSURE: 53 MMHG | HEART RATE: 98 BPM | HEIGHT: 70 IN | WEIGHT: 174.3 LBS | BODY MASS INDEX: 24.95 KG/M2

## 2018-05-07 DIAGNOSIS — I48.20 CHRONIC ATRIAL FIBRILLATION (H): ICD-10-CM

## 2018-05-07 DIAGNOSIS — K85.91 ACUTE PANCREATITIS WITH UNINFECTED NECROSIS, UNSPECIFIED PANCREATITIS TYPE: Primary | ICD-10-CM

## 2018-05-07 DIAGNOSIS — Z79.01 LONG-TERM (CURRENT) USE OF ANTICOAGULANTS: ICD-10-CM

## 2018-05-07 LAB
INR POINT OF CARE: 3.8 (ref 0.86–1.14)
RADIOLOGIST FLAGS: ABNORMAL

## 2018-05-07 PROCEDURE — 99207 ZZC NO CHARGE NURSE ONLY: CPT

## 2018-05-07 PROCEDURE — 85610 PROTHROMBIN TIME: CPT | Mod: QW

## 2018-05-07 PROCEDURE — 36416 COLLJ CAPILLARY BLOOD SPEC: CPT

## 2018-05-07 RX ORDER — IOPAMIDOL 755 MG/ML
105 INJECTION, SOLUTION INTRAVASCULAR ONCE
Status: COMPLETED | OUTPATIENT
Start: 2018-05-07 | End: 2018-05-07

## 2018-05-07 RX ADMIN — IOPAMIDOL 105 ML: 755 INJECTION, SOLUTION INTRAVASCULAR at 08:14

## 2018-05-07 ASSESSMENT — PAIN SCALES - GENERAL: PAINLEVEL: NO PAIN (0)

## 2018-05-07 NOTE — PATIENT INSTRUCTIONS
1. Nutrition referral to see Brooek Garcia, RD  [phone] to schedule     2. Dr. Beasley in 4-6 weeks [phone] for clinic follow-up     3. Pancreatic enzymes: 2 with meals and 1 with snacks.       Follow up:    Please call with any questions or concerns regarding your clinic visit today.    It is a pleasure being involved in your health care.    Contacts post-consultation depending on your need:    Schedule Clinic Appointments       [phone] # 1   M-F 7:30 - 5 pm    Marianne BUSH RN Coordinator           [phone]        OR Procedure Scheduling              [phone]    My Chart is available 24 hours a day and is a secure way to access your records and communicate with your care team.  I strongly recommend signing up if you haven't already done so, if you are comfortable with computers.  If you would like to inquire about this or are having problems with My Chart access, you may call [phone] or go online at [email or web address].  Please allow at least 24 hours for a response and extra time on weekends and Holidays.

## 2018-05-07 NOTE — PROGRESS NOTES
HISTORY OF PRESENT ILLNESS:  Mr. Russo is a 72-year-old who is here for followup. He has a complex history.  He had a distal pancreatectomy in 2016 for IPMN, had ongoing pancreatic duct leak treated with a pancreatic stent and did relatively well with negative margins.  Starting in January of this year, he began having admissions for acute pancreatitis by enzymes and CT.  A CT in February showed slowly enlarging small pseudocyst off the tail, around calcifications or clips.  He stopped drinking alcohol about then.  He had an EUS showing a small collection off the tail and then an ERCP by us with placement of a pancreatic stent with almost no pancreatic manipulation, a biliary sphincterotomy, temporary biliary stent and no pancreatic sphincterotomy.  He was discharged home, doing well in the short-term but readmitted the next day actually quite ill with pancreatitis to the ICU.  He has had a total of 3 subsequent admissions for pancreatitis.  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  Each successive admission, last of which was 3 weeks ago, was for pain flare and pancreatitis.  The CTs have shown improving peripancreatic necrosis.  The biliary stent passed, and then a pancreatic stent was removed by Dr. Plata several weeks ago.  He has been at home now.  His main issue is not actually pain, as he also has only used to Dilaudid within the last 2 weeks.  He usually has no baseline pain.  He just lack of appetite with no interest in food.  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  He did have an IV site infection for which he has sought help in the [facility] ER.  His weight has decreased from 83.9 kg on 04/19 to 79.1 now, which is stable over the last week.  He is afebrile, pulse 98 but has Afib so it is variable.  Denies fevers.  A CT today shows pancreatitis improving,  significant interval decrease in size of peripancreatic walled off necrosis with significantly decreased fluid collection anterior to the pancreatic head uncinate process.  There is small foci of gas in that decreased fluid collection, but no definite demonstration of a fistula.  Pancreatic stent out.  No pancreatic duct dilation.  The distal pancreatectomy is with narrowing of the SMV near the portal confluence.      IMPRESSION/RECOMMENDATIONS:  We spent 25 minutes, more than 50% counseling, reviewing his situation.  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  He had 1 extended hospitalization and 3 subsequent hospitalizations.  He now seems to be doing overall better with no pain, but his main problem is weight loss, inability to show interest in food or really maintain nutrition.  As far as the CT, it shows major improvement, and I think the foci of gas in the decreased peripancreatic collection around the uncinate and very close to the duodenum almost undoubtedly represent spontaneous fistulization.  As long as he is continuing to improve and has no fevers or abdominal pain, I would not put him on antibiotics and not intervene.  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  I would like to follow him closer than usual and see him back in every month or so until his problems have stabilized.      Twenty-five minutes, more than 50% counseling.

## 2018-05-07 NOTE — NURSING NOTE
"Chief Complaint   Patient presents with     Pancreatitis     f/u        Vitals:    05/07/18 1006   BP: 98/53   Pulse: 98   SpO2: 100%   Weight: 174 lb 4.8 oz   Height: 5' 10\"       Body mass index is 25.01 kg/(m^2).      WOUND EVALUATION:                        "

## 2018-05-07 NOTE — PROGRESS NOTES
ANTICOAGULATION FOLLOW-UP CLINIC VISIT    Patient Name:  Antoine Russo  Date:  5/7/2018  Contact Type:  Face to Face    SUBJECTIVE:     Patient Findings     Positives Unexplained INR or factor level change    Comments No changes in activity, or diet noted (pt will start the pancreatic enzymes today that were ordered by the GI doc today).  Pt finished his antibiotic last Tues. No bleeding or increased bruising noted. Took warfarin as prescribed.  Chronic diarrhea, GI appt was today at the [facility].  Patient had 5 mg in the last 7 days, will keep dose at 5 mg/wk by next INR check on Thursday. Pt prefers [facility].  Patient verbalizes understanding and agrees to plan. No further questions or concerns.           OBJECTIVE    INR Protime   Date Value Ref Range Status   05/07/2018 3.8 (A) 0.86 - 1.14 Final       ASSESSMENT / PLAN  INR assessment SUPRA    Recheck INR In: 3 DAYS    INR Location Clinic      Anticoagulation Summary as of 5/7/2018     INR goal 2.0-3.0   Today's INR 3.8!   Maintenance plan 1.25 mg (2.5 mg x 0.5) on Tue, Sat; 2.5 mg (2.5 mg x 1) all other days   Full instructions 5/7: Hold; 5/9: 1.25 mg; Otherwise 1.25 mg on Tue, Sat; 2.5 mg all other days   Weekly total 15 mg   Plan last modified Aparna Kunz, RN (3/8/2018)   Next INR check 5/10/2018   Priority INR   Target end date Indefinite    Indications   Chronic atrial fibrillation (H) [I48.2]  Long-term (current) use of anticoagulants [Z79.01] [Z79.01]         Anticoagulation Episode Summary     INR check location     Preferred lab     Send INR reminders to [facility] POOL    Comments * chronic diarrhea      Anticoagulation Care Providers     Provider Role Specialty Phone number    Katie Gross MD [facility] [phone]            See the Encounter Report to view Anticoagulation Flowsheet and Dosing Calendar (Go to Encounters tab in chart review, and find the Anticoagulation Therapy Visit)        Madhavi Sanders  RN

## 2018-05-07 NOTE — LETTER
5/7/2018       RE: Antoine Russo  [address] [address]     Dear Colleague,    Thank you for referring your patient, Antoine Russo, to the [facility] at [facility]. Please see a copy of my visit note below.    HISTORY OF PRESENT ILLNESS:  Mr. Russo is a 72-year-old who is here for followup. He has a complex history.  He had a distal pancreatectomy in 2016 for IPMN, had ongoing pancreatic duct leak treated with a pancreatic stent and did relatively well with negative margins.  Starting in January of this year, he began having admissions for acute pancreatitis by enzymes and CT.  A CT in February showed slowly enlarging small pseudocyst off the tail, around calcifications or clips.  He stopped drinking alcohol about then.  He had an EUS showing a small collection off the tail and then an ERCP by us with placement of a pancreatic stent with almost no pancreatic manipulation, a biliary sphincterotomy, temporary biliary stent and no pancreatic sphincterotomy.  He was discharged home, doing well in the short-term but readmitted the next day actually quite ill with pancreatitis to the ICU.  He has had a total of 3 subsequent admissions for pancreatitis.  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  Each successive admission, last of which was 3 weeks ago, was for pain flare and pancreatitis.  The CTs have shown improving peripancreatic necrosis.  The biliary stent passed, and then a pancreatic stent was removed by Dr. Plata several weeks ago.  He has been at home now.  His main issue is not actually pain, as he also has only used to Dilaudid within the last 2 weeks.  He usually has no baseline pain.  He just lack of appetite with no interest in food.  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  He did have  an IV site infection for which he has sought help in the [facility] ER.  His weight has decreased from 83.9 kg on 04/19 to 79.1 now, which is stable over the last week.  He is afebrile, pulse 98 but has Afib so it is variable.  Denies fevers.  A CT today shows pancreatitis improving, significant interval decrease in size of peripancreatic walled off necrosis with significantly decreased fluid collection anterior to the pancreatic head uncinate process.  There is small foci of gas in that decreased fluid collection, but no definite demonstration of a fistula.  Pancreatic stent out.  No pancreatic duct dilation.  The distal pancreatectomy is with narrowing of the SMV near the portal confluence.      IMPRESSION/RECOMMENDATIONS:  We spent 25 minutes, more than 50% counseling, reviewing his situation.  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  He had 1 extended hospitalization and 3 subsequent hospitalizations.  He now seems to be doing overall better with no pain, but his main problem is weight loss, inability to show interest in food or really maintain nutrition.  As far as the CT, it shows major improvement, and I think the foci of gas in the decreased peripancreatic collection around the uncinate and very close to the duodenum almost undoubtedly represent spontaneous fistulization.  As long as he is continuing to improve and has no fevers or abdominal pain, I would not put him on antibiotics and not intervene.  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  I would like  to follow him closer than usual and see him back in every month or so until his problems have stabilized.      Twenty-five minutes, more than 50% counseling.         Again, thank you for allowing me to participate in the care of your patient.      Sincerely,    Yovanny Beasley MD

## 2018-05-07 NOTE — MR AVS SNAPSHOT
Antoine JESSICA Russo   5/7/2018 1:00 PM   Anticoagulation Therapy Visit    Description:  72 year old male   Provider:  [facility]   Department:  [facility]           INR as of 5/7/2018     Today's INR 3.8!      Anticoagulation Summary as of 5/7/2018     INR goal 2.0-3.0   Today's INR 3.8!   Full instructions 5/7: Hold; 5/9: 1.25 mg; Otherwise 1.25 mg on Tue, Sat; 2.5 mg all other days   Next INR check 5/10/2018    Indications   Chronic atrial fibrillation (H) [I48.2]  Long-term (current) use of anticoagulants [Z79.01] [Z79.01]         Description     Chronic diarrhea, GI appt.      Your next Anticoagulation Clinic appointment(s)     May 10, 2018  9:45 AM CDT   [facility]agulation [facility] with [facility]   [facility]    [address] [place] [address] [phone]              Contact Numbers     Please call [phone] with any problems or questions regarding your therapy.    If you need to cancel and/or reschedule your appointment please call one of the following numbers:  [facility] - [phone]  [place] - [phone]  [facility] [phone]  [facility] [phone]  [place] - [phone]            May 2018 Details    Sun Mon Tue Wed Thu Fri Sat       1               2               3               4               5                 6               7      Hold   See details      8      1.25 mg         9      1.25 mg         10            11               12                 13               14               15               16               17               18               19                 20               21               22               23               24               25               26                 27               28               29               30               31                  Date Details   05/07 This INR check       Date of next INR:  5/10/2018         How to take your warfarin dose     To take:  1.25 mg Take 0.5  of a 2.5 mg tablet.    To take:  2.5 mg Take 1 of the 2.5 mg tablets.    Hold Do not take your warfarin dose. See the Details table to the right for additional instructions.

## 2018-05-07 NOTE — TELEPHONE ENCOUNTER
Talked to Amina and she is aware the paperwork will be given to her  at his clinic visit.     Paperwork given to Antoine and he will give to wife.     Marianne BUSH, RN Coordinator  Dr. Beasley, Dr. Parish & Dr. Rawls   [facility]  [phone]

## 2018-05-07 NOTE — MR AVS SNAPSHOT
After Visit Summary   5/7/2018    Antoine Russo    MRN: [id number]           Patient Information     Date Of Birth          1945        Visit Information        Provider Department      5/7/2018 11:00 AM Yovanny Beasley MD [facility]        Today's Diagnoses     Pancreatitis    -  1      Care Instructions    1. Nutrition referral to see Brooke Garcia RD  [phone] to schedule     2. Dr. Beasley in 4-6 weeks [phone] for clinic follow-up     3. Pancreatic enzymes: 2 with meals and 1 with snacks.       Follow up:    Please call with any questions or concerns regarding your clinic visit today.    It is a pleasure being involved in your health care.    Contacts post-consultation depending on your need:    Schedule Clinic Appointments       [phone] # 1   M-F 7:30 - 5 pm    Marianne BUSH RN Coordinator           [phone]        OR Procedure Scheduling              [phone]    My Chart is available 24 hours a day and is a secure way to access your records and communicate with your care team.  I strongly recommend signing up if you haven't already done so, if you are comfortable with computers.  If you would like to inquire about this or are having problems with My Chart access, you may call [phone] or go online at [email or web address].  Please allow at least 24 hours for a response and extra time on weekends and Holidays.              Follow-ups after your visit        Additional Services     NUTRITION REFERRAL       Please see Brooke Garcia RD.                  Your next 10 appointments already scheduled     May 07, 2018  1:00 PM CDT   Anticoagulation Visit with [place] ANTI COAG   [facility]    [address] [place] [address]   [phone]            May 08, 2018  8:40 AM CDT   [facility] Visit with Katie Gross MD   [facility]    [address]  "[facility] [address]   [phone]           Bring a current list of meds and any records pertaining to this visit. For Physicals, please bring immunization records and any forms needing to be filled out. Please arrive 10 minutes early to complete paperwork.              Who to contact     Please call your clinic at [phone] to:    Ask questions about your health    Make or cancel appointments    Discuss your medicines    Learn about your test results    Speak to your doctor            Additional Information About Your Visit        BellybalooharYlopo Information     [employer] gives you secure access to your electronic health record. If you see a primary care provider, you can also send messages to your care team and make appointments. If you have questions, please call your primary care clinic.  If you do not have a primary care provider, please call [phone] and they will assist you.      [employer] is an electronic gateway that provides easy, online access to your medical records. With [employer], you can request a clinic appointment, read your test results, renew a prescription or communicate with your care team.     To access your existing account, please contact your [facility] or call [phone] for assistance.        Care EveryWhere ID     This is your Care EveryWhere ID. This could be used by other organizations to access your [place] medical records  [id number]        Your Vitals Were     Pulse Height Pulse Oximetry BMI (Body Mass Index)          98 1.778 m (5' 10\") 100% 25.01 kg/m2         Blood Pressure from Last 3 Encounters:   05/07/18 98/53   04/30/18 120/60   04/24/18 115/54    Weight from Last 3 Encounters:   05/07/18 79.1 kg (174 lb 4.8 oz)   04/30/18 78 kg (172 lb)   04/24/18 81.6 kg (180 lb)              We Performed the Following     NUTRITION REFERRAL          Today's Medication Changes          These changes are accurate as of 5/7/18 12:29 PM.  If you have any " questions, ask your nurse or doctor.               Start taking these medicines.        Dose/Directions    amylase-lipase-protease 93392 units Cpep   Commonly known as:  CREON   Used for:  Pancreatitis   Started by:  Yovanny Beasley MD        Take 1-3 with snacks/meals, up to 12 per day.   Quantity:  360 capsule   Refills:  6         These medicines have changed or have updated prescriptions.        Dose/Directions    gabapentin 300 MG capsule   Commonly known as:  NEURONTIN   This may have changed:    - how much to take  - how to take this  - when to take this  - additional instructions   Used for:  Type 2 diabetes mellitus with diabetic polyneuropathy, without long-term current use of insulin (H)        Take 2 tablest (600 mg) every night   Quantity:  180 capsule   Refills:  1            Where to get your medicines      These medications were sent to [facility] [address] [place] [address] [address] [place] [address]  [id number] [address]     Phone:  [phone]     amylase-lipase-protease 22639 units Cpep                Primary Care Provider Office Phone # Fax #    Katie Gross [phone]       [address] [place] [address]        Goals        General    Healthy Eating (pt-stated)     Notes - Note edited  4/26/2018 11:23 AM by Coreen Craig RN    Patient states he would like to gain knowledge and advice on proper diet recommendations for his given diagnosis; he is meeting with the dietician tomorrow to review this in detail.  RN CC will be available in the future if needed.     4/26/18: Patient saw the dietician and is trying to figure out what will work for him regarding his diet and increasing protein.             Equal Access to Services     [facility] CenterVISHNU : Raquel Bernstein, charli block, qagladis kaalrandy matthews, koffi fay. [facility] [phone].    ATENCIÓN: Si habla español, tiene a snyder disposición servicios gratuitos de  asistencia lingüística. Kia al [phone].    We comply with applicable federal civil rights laws and [place] laws. We do not discriminate on the basis of race, color, national origin, age, disability, sex, sexual orientation, or gender identity.            Thank you!     Thank you for choosing [facility]  for your care. Our goal is always to provide you with excellent care. Hearing back from our patients is one way we can continue to improve our services. Please take a few minutes to complete the written survey that you may receive in the mail after your visit with us. Thank you!             Your Updated Medication List - Protect others around you: Learn how to safely use, store and throw away your medicines at www.disposemymeds.org.          This list is accurate as of 5/7/18 12:29 PM.  Always use your most recent med list.                   Brand Name Dispense Instructions for use Diagnosis    amylase-lipase-protease 70232 units Cpep    CREON    360 capsule    Take 1-3 with snacks/meals, up to 12 per day.    Pancreatitis       ASPIRIN NOT PRESCRIBED    INTENTIONAL     Antiplatelet medication not prescribed intentionally due to Current anticoagulant therapy (warfarin/enoxaparin)        gabapentin 300 MG capsule    NEURONTIN    180 capsule    Take 2 tablest (600 mg) every night    Type 2 diabetes mellitus with diabetic polyneuropathy, without long-term current use of insulin (H)       glipiZIDE 2.5 MG 24 hr tablet    glipiZIDE XL    90 tablet    Take 1 tablet (2.5 mg) by mouth every morning    Type 2 diabetes mellitus with diabetic polyneuropathy, without long-term current use of insulin (H)       HYDROmorphone 2 MG tablet    DILAUDID    10 tablet    Take 1 tablet (2 mg) by mouth every 3 hours as needed for moderate to severe pain    Acute recurrent pancreatitis       lisinopril 10 MG tablet    PRINIVIL/ZESTRIL    90 tablet    Take 1 tablet (10 mg) by mouth daily    Persistent proteinuria        metFORMIN 500 MG 24 hr tablet    GLUCOPHAGE-XR    360 tablet    Take 2 tablets (1,000 mg) by mouth 2 times daily (with meals)    Type 2 diabetes mellitus with diabetic polyneuropathy, without long-term current use of insulin (H)       metoprolol succinate 50 MG 24 hr tablet    TOPROL-XL    180 tablet    Take 1 tablet (50 mg) by mouth 2 times daily    Hypertension, benign essential, goal below 140/90       multivitamin, therapeutic with minerals Tabs tablet     30 each    Take 1 tablet by mouth daily.    Surgery aftercare       tamsulosin 0.4 MG capsule    FLOMAX    90 capsule    Take 1 capsule (0.4 mg) by mouth daily    Urgency of urination, Hypertrophy of prostate without urinary obstruction       * WARFARIN SODIUM PO      Take 2.5 mg by mouth Sun, Mon, Wed, Thurs, Fri        * WARFARIN SODIUM PO      Take 1.25 mg by mouth Tues, Sat        * Notice:  This list has 2 medication(s) that are the same as other medications prescribed for you. Read the directions carefully, and ask your doctor or other care provider to review them with you.

## 2018-05-07 NOTE — DISCHARGE INSTRUCTIONS

## 2018-05-08 ENCOUNTER — OFFICE VISIT (OUTPATIENT)
Dept: FAMILY MEDICINE | Facility: CLINIC | Age: 73
End: 2018-05-08
Payer: COMMERCIAL

## 2018-05-08 VITALS
SYSTOLIC BLOOD PRESSURE: 98 MMHG | DIASTOLIC BLOOD PRESSURE: 62 MMHG | TEMPERATURE: 96.4 F | WEIGHT: 171 LBS | RESPIRATION RATE: 20 BRPM | HEART RATE: 52 BPM | OXYGEN SATURATION: 97 % | BODY MASS INDEX: 24.54 KG/M2

## 2018-05-08 DIAGNOSIS — R63.4 LOSS OF WEIGHT: ICD-10-CM

## 2018-05-08 DIAGNOSIS — K85.91 NECROTIZING PANCREATITIS: ICD-10-CM

## 2018-05-08 DIAGNOSIS — I95.2 HYPOTENSION DUE TO DRUGS: ICD-10-CM

## 2018-05-08 PROCEDURE — 99214 OFFICE O/P EST MOD 30 MIN: CPT | Performed by: FAMILY MEDICINE

## 2018-05-08 NOTE — NURSING NOTE
"Chief Complaint   Patient presents with     Pancreatitis     f/u weight       Initial BP 98/62 (BP Location: Right arm)  Pulse 52  Temp 96.4  F (35.8  C) (Tympanic)  Resp 20  Wt 171 lb (77.6 kg)  SpO2 97%  BMI 24.54 kg/m2 Estimated body mass index is 24.54 kg/(m^2) as calculated from the following:    Height as of 5/7/18: 5' 10\" (1.778 m).    Weight as of this encounter: 171 lb (77.6 kg).      Health Maintenance that is potentially due pending provider review:  NONE    n/a    Is there anyone who you would like to be able to receive your results? No  If yes have patient fill out VIDYA    "

## 2018-05-08 NOTE — MR AVS SNAPSHOT
After Visit Summary   5/8/2018    Antoine Russo    MRN: [id number]           Patient Information     Date Of Birth          1945        Visit Information        Provider Department      5/8/2018 8:40 AM Katie Gross MD [facility]        Today's Diagnoses     Recurrent pancreatitis (H)    -  1    Necrotizing pancreatitis        Hypotension due to drugs        Loss of weight          Care Instructions    Stop the lisinopril for now    Pancreatic enzymes as planned    Recheck with me in 2 weeks          Follow-ups after your visit        Your next 10 appointments already scheduled     May 10, 2018  9:45 AM CDT   Anticoagulation [facility] with [place] ANTI COAG   [facility]    [address] [place] [address] [phone]              Who to contact     If you have questions or need follow up information about today's clinic visit or your schedule please contact [facility] directly at [phone].  Normal or non-critical lab and imaging results will be communicated to you by [employer]hart, letter or phone within 4 business days after the clinic has received the results. If you do not hear from us within 7 days, please contact the clinic through [employer] or phone. If you have a critical or abnormal lab result, we will notify you by phone as soon as possible.  Submit refill requests through [employer] or call your pharmacy and they will forward the refill request to us. Please allow 3 business days for your refill to be completed.          Additional Information About Your Visit        MyChart Information     [employer] gives you secure access to your electronic health record. If you see a primary care provider, you can also send messages to your care team and make appointments. If you have questions, please call your primary care clinic.  If you do not have a primary care provider, please call [phone] and  they will assist you.        Care EveryWhere ID     This is your Care EveryWhere ID. This could be used by other organizations to access your [place] medical records  [id number]        Your Vitals Were     Pulse Temperature Respirations Pulse Oximetry BMI (Body Mass Index)       52 96.4  F (35.8  C) (Tympanic) 20 97% 24.54 kg/m2        Blood Pressure from Last 3 Encounters:   05/08/18 98/62   05/07/18 98/53   04/30/18 120/60    Weight from Last 3 Encounters:   05/08/18 171 lb (77.6 kg)   05/07/18 174 lb 4.8 oz (79.1 kg)   04/30/18 172 lb (78 kg)              Today, you had the following     No orders found for display         Today's Medication Changes          These changes are accurate as of 5/8/18  9:09 AM.  If you have any questions, ask your nurse or doctor.               These medicines have changed or have updated prescriptions.        Dose/Directions    gabapentin 300 MG capsule   Commonly known as:  NEURONTIN   This may have changed:    - how much to take  - how to take this  - when to take this  - additional instructions   Used for:  Type 2 diabetes mellitus with diabetic polyneuropathy, without long-term current use of insulin (H)        Take 2 tablest (600 mg) every night   Quantity:  180 capsule   Refills:  1                Primary Care Provider Office Phone # Fax #    MihcelleSary Gross [phone]       [address] [place] [address]        Goals        General    Healthy Eating (pt-stated)     Notes - Note edited  4/26/2018 11:23 AM by Coreen Craig RN    Patient states he would like to gain knowledge and advice on proper diet recommendations for his given diagnosis; he is meeting with the dietician tomorrow to review this in detail.  RN CC will be available in the future if needed.     4/26/18: Patient saw the dietician and is trying to figure out what will work for him regarding his diet and increasing protein.             Equal Access to Services     ABBEY ARMANDO: Raquel  stoney Bernstein, wazoraidada preciousadaha, qamariselta kamaricruz vidhyakimo, waxgaye mynor recinosjoseelijah davidson sumeet. [facility] [phone].    ATENCIÓN: Si habla kyle, tiene a snyder disposición servicios gratuitos de asistencia lingüística. Allaname al [phone].    We comply with applicable federal civil rights laws and [place] laws. We do not discriminate on the basis of race, color, national origin, age, disability, sex, sexual orientation, or gender identity.            Thank you!     Thank you for choosing [facility]  for your care. Our goal is always to provide you with excellent care. Hearing back from our patients is one way we can continue to improve our services. Please take a few minutes to complete the written survey that you may receive in the mail after your visit with us. Thank you!             Your Updated Medication List - Protect others around you: Learn how to safely use, store and throw away your medicines at www.disposemymeds.org.          This list is accurate as of 5/8/18  9:09 AM.  Always use your most recent med list.                   Brand Name Dispense Instructions for use Diagnosis    amylase-lipase-protease 44697 units Cpep    CREON    360 capsule    Take 1-3 with snacks/meals, up to 12 per day.    Acute pancreatitis with uninfected necrosis, unspecified pancreatitis type       ASPIRIN NOT PRESCRIBED    INTENTIONAL     Antiplatelet medication not prescribed intentionally due to Current anticoagulant therapy (warfarin/enoxaparin)        gabapentin 300 MG capsule    NEURONTIN    180 capsule    Take 2 tablest (600 mg) every night    Type 2 diabetes mellitus with diabetic polyneuropathy, without long-term current use of insulin (H)       glipiZIDE 2.5 MG 24 hr tablet    glipiZIDE XL    90 tablet    Take 1 tablet (2.5 mg) by mouth every morning    Type 2 diabetes mellitus with diabetic polyneuropathy, without long-term current use of insulin (H)       HYDROmorphone 2 MG tablet     DILAUDID    10 tablet    Take 1 tablet (2 mg) by mouth every 3 hours as needed for moderate to severe pain    Acute recurrent pancreatitis       lisinopril 10 MG tablet    PRINIVIL/ZESTRIL    90 tablet    Take 1 tablet (10 mg) by mouth daily    Persistent proteinuria       metFORMIN 500 MG 24 hr tablet    GLUCOPHAGE-XR    360 tablet    Take 2 tablets (1,000 mg) by mouth 2 times daily (with meals)    Type 2 diabetes mellitus with diabetic polyneuropathy, without long-term current use of insulin (H)       metoprolol succinate 50 MG 24 hr tablet    TOPROL-XL    180 tablet    Take 1 tablet (50 mg) by mouth 2 times daily    Hypertension, benign essential, goal below 140/90       multivitamin, therapeutic with minerals Tabs tablet     30 each    Take 1 tablet by mouth daily.    Surgery aftercare       tamsulosin 0.4 MG capsule    FLOMAX    90 capsule    Take 1 capsule (0.4 mg) by mouth daily    Urgency of urination, Hypertrophy of prostate without urinary obstruction       * WARFARIN SODIUM PO      Take 2.5 mg by mouth Sun, Mon, Wed, Thurs, Fri        * WARFARIN SODIUM PO      Take 1.25 mg by mouth Tues, Sat        * Notice:  This list has 2 medication(s) that are the same as other medications prescribed for you. Read the directions carefully, and ask your doctor or other care provider to review them with you.

## 2018-05-08 NOTE — PROGRESS NOTES
SUBJECTIVE:   Antoine Russo is a 72 year old male who presents to clinic today for the following health issues:      Pancreatitis recheck - weight check    Wt Readings from Last 5 Encounters:   05/08/18 171 lb (77.6 kg)   05/07/18 174 lb 4.8 oz (79.1 kg)   04/30/18 172 lb (78 kg)   04/24/18 180 lb (81.6 kg)   04/19/18 185 lb (83.9 kg)      Appetite was good yesterday. He saw Dr Beasley yesterday, will see their nutritionist, and follow up in 4-6 weeks.    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  He had 1 extended hospitalization and 3 subsequent hospitalizations.  He now seems to be doing overall better with no pain, but his main problem is weight loss, inability to show interest in food or really maintain nutrition.  As far as the CT, it shows major improvement, and I think the foci of gas in the decreased peripancreatic collection around the uncinate and very close to the duodenum almost undoubtedly represent spontaneous fistulization.  As long as he is continuing to improve and has no fevers or abdominal pain, I would not put him on antibiotics and not intervene.  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  I would like to follow him closer than usual and see him back in every month or so until his problems have stabilized.     hypotensioni-note last few blood pressures. Does have some light headedness, little shortness of breath. No chest pain.     Problem list and histories reviewed & adjusted, as  indicated.    Additional history: as documented    BP Readings from Last 3 Encounters:   05/08/18 98/62   05/07/18 98/53   04/30/18 120/60    Wt Readings from Last 3 Encounters:   05/08/18 171 lb (77.6 kg)   05/07/18 174 lb 4.8 oz (79.1 kg)   04/30/18 172 lb (78 kg)             Reviewed and updated as needed this visit by clinical staff  Tobacco  Allergies  Meds  Med Hx  Surg Hx  Fam Hx  Soc Hx      Reviewed and updated as needed this visit by Provider         ROS:  CONSTITUTIONAL: NEGATIVE for fever, chills, positive for change in weight  ENT/MOUTH: NEGATIVE for ear, mouth and throat problems  RESP: NEGATIVE for significant cough or SOB  CV: NEGATIVE for chest pain, palpitations or peripheral edema  GI: positive for loose stools since operation  : negative for dysuria, hematuria, decreased urinary stream    OBJECTIVE:                                                    BP 98/62 (BP Location: Right arm)  Pulse 52  Temp 96.4  F (35.8  C) (Tympanic)  Resp 20  Wt 171 lb (77.6 kg)  SpO2 97%  BMI 24.54 kg/m2  Body mass index is 24.54 kg/(m^2).  GENERAL: appears fatigued, no distress  NECK: no tenderness, no adenopathy, no asymmetry, no masses, no stiffness; thyroid- normal to palpation  RESP: lungs clear to auscultation - no rales, no rhonchi, no wheezes  CV: regular rates and rhythm, normal S1 S2, no S3 or S4 and no murmur, no click or rub -  ABDOMEN: soft, no tenderness,  Multiple scares,no  hepatosplenomegaly, no masses  Ext: no edema     ASSESSMENT/PLAN:                                                      ASSESSMENT:  1. Recurrent pancreatitis (H)    2. Necrotizing pancreatitis    3. Hypotension due to drugs    4. Loss of weight        PLAN:  No orders of the defined types were placed in this encounter.      Patient Instructions   Stop the lisinopril for now    Pancreatic enzymes as planned    Recheck with me in 2 weeks     Katie Gross MD  [facility]

## 2018-05-09 ENCOUNTER — TELEPHONE (OUTPATIENT)
Dept: GASTROENTEROLOGY | Facility: CLINIC | Age: 73
End: 2018-05-09

## 2018-05-09 NOTE — TELEPHONE ENCOUNTER
[facility] Call [facility]    Phone Message    May a detailed message be left on voicemail: yes    Reason for Call: Other: Pts wife Amina is requesting a call back to discuss paperwork from [place] regarding an intermediate leave of absence. she would like to confirm if forms were received.     Action Taken: Message routed to:  [facility] ([facility] Landon/Gerry

## 2018-05-09 NOTE — TELEPHONE ENCOUNTER
Called Antoine and he has the paperwork given to him in clinic but per his wife it may need to be modified.   Will watch for new paperwork to come over.     He would also like to be seen in 4-6 weeks per Dr. Beasley. Advised will have our  call him with date and time. Ok per Dr. Beasley to overbook.   Message sent to scheduling.     Marianne BUSH RN Coordinator  Dr. Beasley, Dr. Parish & Dr. Rawls   [facility]  [phone]

## 2018-05-10 ENCOUNTER — ANTICOAGULATION THERAPY VISIT (OUTPATIENT)
Dept: ANTICOAGULATION | Facility: CLINIC | Age: 73
End: 2018-05-10
Payer: COMMERCIAL

## 2018-05-10 DIAGNOSIS — Z79.01 LONG-TERM (CURRENT) USE OF ANTICOAGULANTS: ICD-10-CM

## 2018-05-10 DIAGNOSIS — I48.20 CHRONIC ATRIAL FIBRILLATION (H): ICD-10-CM

## 2018-05-10 LAB — INR POINT OF CARE: 2.3 (ref 0.86–1.14)

## 2018-05-10 PROCEDURE — 99207 ZZC NO CHARGE NURSE ONLY: CPT

## 2018-05-10 PROCEDURE — 85610 PROTHROMBIN TIME: CPT | Mod: QW

## 2018-05-10 PROCEDURE — 36416 COLLJ CAPILLARY BLOOD SPEC: CPT

## 2018-05-10 NOTE — PROGRESS NOTES
ANTICOAGULATION FOLLOW-UP CLINIC VISIT    Patient Name:  Antoine Russo  Date:  5/10/2018  Contact Type:  Face to Face    SUBJECTIVE:     Patient Findings     Positives Change in medications (lisinopril on hold. started pancreatic enzymes 3 days ago.), Change in diet/appetite (stopped carnation instant breakfast several days ago), Intentional hold of therapy (5-3 and 5-4 and 5-7)    Comments Patient is seeing a nutritionist at the  on 5-14-18 and will have INR in [facility] prior to this. His INRs have been labile lately due to issues with his pancreas. This seems to be improving now since starting the pancreatic enzymes. The patient has been able to eat more the last few days and less issues with diarrhea. He has only had 5 mg in the last week so will continue with 1.25 mg/day, which is the smallest dosing we can do without holding more warfarin. Writer suspects he will require more warfarin soon as he continues to improve his diet. No other changes or concerns.            OBJECTIVE    INR Protime   Date Value Ref Range Status   05/10/2018 2.3 (A) 0.86 - 1.14 Final       ASSESSMENT / PLAN  INR assessment THER    Recheck INR In: 4 DAYS    INR Location Clinic      Anticoagulation Summary as of 5/10/2018     INR goal 2.0-3.0   Today's INR 2.3   Maintenance plan 1.25 mg (2.5 mg x 0.5) on Tue, Sat; 2.5 mg (2.5 mg x 1) all other days   Full instructions 5/10: 1.25 mg; 5/11: 1.25 mg; 5/13: 1.25 mg; Otherwise 1.25 mg on Tue, Sat; 2.5 mg all other days   Weekly total 15 mg   Plan last modified Aparna Kunz, RN (3/8/2018)   Next INR check 5/14/2018   Priority INR   Target end date Indefinite    Indications   Chronic atrial fibrillation (H) [I48.2]  Long-term (current) use of anticoagulants [Z79.01] [Z79.01]         Anticoagulation Episode Summary     INR check location     Preferred lab     Send INR reminders to [facility] POOL    Comments * chronic diarrhea      Anticoagulation Care Providers     Provider Role  Specialty Phone number    Van Ethan, Katie Okeefe MD [facility] [phone]            See the Encounter Report to view Anticoagulation Flowsheet and Dosing Calendar (Go to Encounters tab in chart review, and find the Anticoagulation Therapy Visit)        Aparna Kunz RN

## 2018-05-10 NOTE — MR AVS SNAPSHOT
Antoine Russo   5/10/2018 9:45 AM   Anticoagulation Therapy Visit    Description:  72 year old male   Provider:  NB ANTI COAG   Department:  [facility] Anticoag           INR as of 5/10/2018     Today's INR 2.3      Anticoagulation Summary as of 5/10/2018     INR goal 2.0-3.0   Today's INR 2.3   Full instructions 5/10: 1.25 mg; 5/11: 1.25 mg; 5/13: 1.25 mg; Otherwise 1.25 mg on Tue, Sat; 2.5 mg all other days   Next INR check 5/14/2018    Indications   Chronic atrial fibrillation (H) [I48.2]  Long-term (current) use of anticoagulants [Z79.01] [Z79.01]         Your next Anticoagulation Clinic appointment(s)     May 14, 2018  9:30 AM CDT   Anticoagulation Visit with [facility] ANTI COAG   [facility]    [address] [place] [address] [phone]              Contact Numbers     Please call [phone] with any problems or questions regarding your therapy.    If you need to cancel and/or reschedule your appointment please call one of the following numbers:  [facility] - [phone]  [facility] [phone]  [facility] [phone]  [facility] [phone]  [place] - [phone]            May 2018 Details    Sun Mon Tue Wed Thu Fri Sat       1               2               3               4               5                 6               7               8               9               10      1.25 mg   See details      11      1.25 mg         12      1.25 mg           13      1.25 mg         14            15               16               17               18               19                 20               21               22               23               24               25               26                 27               28               29               30               31                  Date Details   05/10 This INR check       Date of next INR:  5/14/2018         How to take your warfarin dose     To take:  1.25 mg Take 0.5 of a 2.5 mg  tablet.    To take:  2.5 mg Take 1 of the 2.5 mg tablets.

## 2018-05-11 ENCOUNTER — TELEPHONE (OUTPATIENT)
Dept: GASTROENTEROLOGY | Facility: CLINIC | Age: 73
End: 2018-05-11

## 2018-05-11 NOTE — TELEPHONE ENCOUNTER
[facility] Call Center    Phone Message    May a detailed message be left on voicemail: yes    Reason for Call: Other: Patient's wife Lily called, said that 7 pages of correct paperwork for her leave of absence was sent about 20 minutes ago from [place]. Please call her to confirm receipt.     Action Taken: Message routed to:  [facility] ([facility]

## 2018-05-14 ENCOUNTER — ALLIED HEALTH/NURSE VISIT (OUTPATIENT)
Dept: GASTROENTEROLOGY | Facility: CLINIC | Age: 73
End: 2018-05-14
Payer: COMMERCIAL

## 2018-05-14 ENCOUNTER — ANTICOAGULATION THERAPY VISIT (OUTPATIENT)
Dept: ANTICOAGULATION | Facility: CLINIC | Age: 73
End: 2018-05-14
Payer: COMMERCIAL

## 2018-05-14 VITALS — HEIGHT: 70 IN | WEIGHT: 173 LBS | BODY MASS INDEX: 24.77 KG/M2

## 2018-05-14 DIAGNOSIS — Z71.3 NUTRITIONAL COUNSELING: ICD-10-CM

## 2018-05-14 DIAGNOSIS — Z79.01 LONG-TERM (CURRENT) USE OF ANTICOAGULANTS: ICD-10-CM

## 2018-05-14 DIAGNOSIS — K85.91 NECROTIZING PANCREATITIS: Primary | ICD-10-CM

## 2018-05-14 DIAGNOSIS — I48.20 CHRONIC ATRIAL FIBRILLATION (H): ICD-10-CM

## 2018-05-14 DIAGNOSIS — E11.9 DIABETES MELLITUS, TYPE 2 (H): ICD-10-CM

## 2018-05-14 LAB — INR POINT OF CARE: 2.4 (ref 0.86–1.14)

## 2018-05-14 PROCEDURE — 85610 PROTHROMBIN TIME: CPT | Mod: QW

## 2018-05-14 PROCEDURE — 36416 COLLJ CAPILLARY BLOOD SPEC: CPT

## 2018-05-14 PROCEDURE — 99207 ZZC NO CHARGE NURSE ONLY: CPT

## 2018-05-14 NOTE — MR AVS SNAPSHOT
After Visit Summary   5/14/2018    Antoine Russo    MRN: [id number]           Patient Information     Date Of Birth          1945        Visit Information        Provider Department      5/14/2018 11:00 AM Brooke Garcia RD M [facility]        Care Instructions    It was nice meeting you today:    -Follow a lowfat diet. Reviewed foods higher in fat and tips/suggestions for selecting lower fat substitutions/foods.   -Do not skip meals. Can eat 4-6 smaller balanced meals/snacks per day if better tolerated.   -Can have protein drink/low fat oral nutrition supplement drink if unable to eat a meal.   -Drink at least 48 oz-64 oz fluids per day.  -Continue to avoid all alcoholic beverages.  -Recommended taking enzymes as directed by MD/NP/PA which is 1-3 capsules with meals/snacks (up to 12 capsules maximum per day). Can take capsules at the beginning of meals/snacks.  If you would like to schedule a follow up appointment with Brooke Garcia, Registered Dietitian, please call [phone].            Follow-ups after your visit        Your next 10 appointments already scheduled     May 17, 2018 11:30 AM CDT   Anticoagulation Visit with NB ANTI COAG   [facility] ([facility])    [address] [place] [address] [phone]            May 22, 2018  9:00 AM CDT   Office Visit with Katie Gross MD   [facility] ([facility])    [address] [place] [address] [phone]           Bring a current list of meds and any records pertaining to this visit. For Physicals, please bring immunization records and any forms needing to be filled out. Please arrive 10 minutes early to complete paperwork.              Who to contact     Please call your clinic at [phone] to:    Ask questions about your health    Make or cancel appointments    Discuss your medicines    Learn about your  test results    Speak to your doctor            Additional Information About Your Visit        [employer] Information     [employer] gives you secure access to your electronic health record. If you see a primary care provider, you can also send messages to your care team and make appointments. If you have questions, please call your primary care clinic.  If you do not have a primary care provider, please call [phone] and they will assist you.      [employer] is an electronic gateway that provides easy, online access to your medical records. With [employer], you can request a clinic appointment, read your test results, renew a prescription or communicate with your care team.     To access your existing account, please contact your [facility] or call [phone] for assistance.        Care EveryWhere ID     This is your Care EveryWhere ID. This could be used by other organizations to access your [facility] medical records  [id number]         Blood Pressure from Last 3 Encounters:   05/08/18 98/62   05/07/18 98/53   04/30/18 120/60    Weight from Last 3 Encounters:   05/08/18 77.6 kg (171 lb)   05/07/18 79.1 kg (174 lb 4.8 oz)   04/30/18 78 kg (172 lb)              Today, you had the following     No orders found for display         Today's Medication Changes          These changes are accurate as of 5/14/18 11:40 AM.  If you have any questions, ask your nurse or doctor.               These medicines have changed or have updated prescriptions.        Dose/Directions    gabapentin 300 MG capsule   Commonly known as:  NEURONTIN   This may have changed:    - how much to take  - how to take this  - when to take this  - additional instructions   Used for:  Type 2 diabetes mellitus with diabetic polyneuropathy, without long-term current use of insulin (H)        Take 2 tablest (600 mg) every night   Quantity:  180 capsule   Refills:  1                Primary Care Provider Office Phone # Fax #     Katie Gross [phone]       [address] [facility] [address]        Goals        General    Healthy Eating (pt-stated)     Notes - Note edited  4/26/2018 11:23 AM by Coreen Craig RN    Patient states he would like to gain knowledge and advice on proper diet recommendations for his given diagnosis; he is meeting with the dietician tomorrow to review this in detail.  RN CC will be available in the future if needed.     4/26/18: Patient saw the dietician and is trying to figure out what will work for him regarding his diet and increasing protein.             Equal Access to Services     [facility]: Hadii stoney matthew hadsebastian Sotomy, waaxda antonioqadaha, qaybmckenna kaalmadae matthews, koffi davidson . So [facility] [phone].    ATENCIÓN: Si habla español, tiene a snyder disposición servicios gratuitos de asistencia lingüística. Llame al [phone].    We comply with applicable federal civil rights laws and [place] laws. We do not discriminate on the basis of race, color, national origin, age, disability, sex, sexual orientation, or gender identity.            Thank you!     Thank you for choosing [facility]  for your care. Our goal is always to provide you with excellent care. Hearing back from our patients is one way we can continue to improve our services. Please take a few minutes to complete the written survey that you may receive in the mail after your visit with us. Thank you!             Your Updated Medication List - Protect others around you: Learn how to safely use, store and throw away your medicines at www.disposemymeds.org.          This list is accurate as of 5/14/18 11:40 AM.  Always use your most recent med list.                   Brand Name Dispense Instructions for use Diagnosis    amylase-lipase-protease 92808 units Cpep    CREON    360 capsule    Take 1-3 with snacks/meals, up to 12 per day.    Acute pancreatitis with  uninfected necrosis, unspecified pancreatitis type       ASPIRIN NOT PRESCRIBED    INTENTIONAL     Antiplatelet medication not prescribed intentionally due to Current anticoagulant therapy (warfarin/enoxaparin)        gabapentin 300 MG capsule    NEURONTIN    180 capsule    Take 2 tablest (600 mg) every night    Type 2 diabetes mellitus with diabetic polyneuropathy, without long-term current use of insulin (H)       glipiZIDE 2.5 MG 24 hr tablet    glipiZIDE XL    90 tablet    Take 1 tablet (2.5 mg) by mouth every morning    Type 2 diabetes mellitus with diabetic polyneuropathy, without long-term current use of insulin (H)       HYDROmorphone 2 MG tablet    DILAUDID    10 tablet    Take 1 tablet (2 mg) by mouth every 3 hours as needed for moderate to severe pain    Acute recurrent pancreatitis       lisinopril 10 MG tablet    PRINIVIL/ZESTRIL    90 tablet    Take 1 tablet (10 mg) by mouth daily    Persistent proteinuria       metFORMIN 500 MG 24 hr tablet    GLUCOPHAGE-XR    360 tablet    Take 2 tablets (1,000 mg) by mouth 2 times daily (with meals)    Type 2 diabetes mellitus with diabetic polyneuropathy, without long-term current use of insulin (H)       metoprolol succinate 50 MG 24 hr tablet    TOPROL-XL    180 tablet    Take 1 tablet (50 mg) by mouth 2 times daily    Hypertension, benign essential, goal below 140/90       multivitamin, therapeutic with minerals Tabs tablet     30 each    Take 1 tablet by mouth daily.    Surgery aftercare       tamsulosin 0.4 MG capsule    FLOMAX    90 capsule    Take 1 capsule (0.4 mg) by mouth daily    Urgency of urination, Hypertrophy of prostate without urinary obstruction       * WARFARIN SODIUM PO      Take 2.5 mg by mouth Sun, Mon, Wed, Thurs, Fri        * WARFARIN SODIUM PO      Take 1.25 mg by mouth Tues, Sat        * Notice:  This list has 2 medication(s) that are the same as other medications prescribed for you. Read the directions carefully, and ask your doctor or  other care provider to review them with you.

## 2018-05-14 NOTE — PROGRESS NOTES
ANTICOAGULATION FOLLOW-UP CLINIC VISIT    Patient Name:  Antoine Russo  Date:  5/14/2018  Contact Type:  Face to Face    SUBJECTIVE:     Patient Findings     Positives Other complaints    Comments Patient reports today that he went to fill his RX for Creon and it was $3300, he reports he will be able to stay on it for now as his deductible has been met but he is unsure what the future will hold for filling a med this expensive.  He plans to have a conversation with Brooke about this today. He states he is feeling a little better, he has more energy that he has had in quite some time and he is eating. He has not gained any weight back yet.  INR therapeutic today, writer will have him continue 1.25mg daily which will increase his warfarin dose to 8.75mg by Thursday when he is scheduled to recheck his INR.            OBJECTIVE    INR Protime   Date Value Ref Range Status   05/14/2018 2.4 (A) 0.86 - 1.14 Final       ASSESSMENT / PLAN  INR assessment THER    Recheck INR In: 3 DAYS    INR Location Clinic      Anticoagulation Summary as of 5/14/2018     INR goal 2.0-3.0   Today's INR 2.4   Maintenance plan No maintenance plan   Full instructions 5/14: 1.25 mg; 5/15: 1.25 mg; 5/16: 1.25 mg   Weekly total 15 mg   Plan last modified Doris Gallardo, RN (5/14/2018)   Next INR check 5/17/2018   Priority INR   Target end date Indefinite    Indications   Chronic atrial fibrillation (H) [I48.2]  Long-term (current) use of anticoagulants [Z79.01] [Z79.01]         Anticoagulation Episode Summary     INR check location     Preferred lab     Send INR reminders to [facility] [place]    Comments * chronic diarrhea      Anticoagulation Care Providers     Provider Role Specialty Phone number    Katie Gross MD [facility] [phone]            See the Encounter Report to view Anticoagulation Flowsheet and Dosing Calendar (Go to Encounters tab in chart review, and find the Anticoagulation Therapy  Visit)        Doris Gallardo RN

## 2018-05-14 NOTE — PROGRESS NOTES
"[facility] Outpatient Medical Nutrition Therapy      Time Spent:  45 minutes  Session Type:  Initial Individual Session  Referring Physician:  Dr. Yovanny Beasley    Nutrition Assessment:  Patient is here for initial visit with Registered Dietitian (RD).  Patient is a 72 y.o. male with history of Necrotizing pancreatitis, recurrent pancreatitis, type 2 diabetes, hx oral cancer, C diff, hypertension on warfarin. Pt reported that he quit smoking in 2006 and used to smoke 2.5 ppd. Discontinued all alcohol on January 22nd. He reported ~30 lbs weight loss since January 1st (over past ~4.5 months). Does have an appetite now which he reported has improved over the past week since starting Creon. He reported that his intake has increased as well over the last week. He is tolerating meals. Reported that having diarrhea is normal for him since 2012. Currently denies any nausea/vomiting and denies any pain with/without eating.    Height:   Ht Readings from Last 1 Encounters:   05/07/18 1.778 m (5' 10\")     Weight:  Since January 2018 has lost ~30# was 195 lbs at home and today at home was 165#. In clinic today was 173 lbs.   Wt Readings from Last 20 Encounters:   05/08/18 77.6 kg (171 lb)   05/07/18 79.1 kg (174 lb 4.8 oz)   04/30/18 78 kg (172 lb)   04/24/18 81.6 kg (180 lb)   04/19/18 83.9 kg (185 lb)   04/17/18 83.2 kg (183 lb 6.4 oz)   04/16/18 82.4 kg (181 lb 9.6 oz)   04/02/18 92.4 kg (203 lb 9.6 oz)   03/30/18 90.7 kg (200 lb)   03/29/18 91.1 kg (200 lb 13.4 oz)   03/16/18 89.8 kg (198 lb)   03/07/18 90.6 kg (199 lb 11.2 oz)   02/28/18 87.3 kg (192 lb 7.4 oz)   01/30/18 92.1 kg (203 lb)   01/30/18 90.3 kg (199 lb)   01/22/18 90.7 kg (200 lb)   01/09/18 92.4 kg (203 lb 9.6 oz)   01/01/18 90.7 kg (200 lb)   10/10/17 92.1 kg (203 lb)   08/02/17 90.3 kg (199 lb)     BMI: 24.87 healthy    Diet Recall:  (usual intake). Reported that he tends to eat ~2-3 meals/snacks per day.   Meal Food    Breakfast Couple hrs after getting up: " Bowl cereal with 2% milk OR stuffed chicken breast and 3 hard boiled eggs   Lunch Skip OR thin oreo cookies OR [employer] roast beef and cheese sandwich if out   Dinner Smart one Frozen meal   Snacks Apple slice OR grapes OR skewer of grilled shrimp   Beverages 16 oz Black coffee, 48 oz -60 oz water, 1 glass tomato juice   Alcohol Intake None since .       Labs:  On  sodium (131), alb (2.4). On 18 lipase 552, prealbumin (7) and Vit A (0.16), 3/16/2018 A1c (7.7).  Pertinent Medications/vitamin and mineral supplements:    Multivitamin for over 50 yrs. Creon 36,000 (order to take 1-3 per meals/snack, up to 12 per day). Warfarin and metformin.  Food Allergies:  NKFA  Estimated Nutrition Needs based on current body weight of 79 k-2370 calories (25-30 kcals/kg), 79g protein (1g/kg), 5573-0706 ml fluids (~1 ml/kcal or total fluids per MD).     MALNUTRITION:  % Weight Loss:  > 10% in 6 months (severe malnutrition)  % Intake:  <75% for >/= 1 month (non-severe malnutrition)  Subcutaneous Fat Loss:  None observed  Muscle Loss:  None observed  Fluid Retention:  None noted    Malnutrition Diagnosis: Non-Severe malnutrition  In Context of:  Chronic illness or disease    Nutrition Diagnosis:      Inadequate oral intake related to necrotizing pancreatitis, recent hx nausea and inability to tolerate much PO as evidenced by pt report and significant weight loss of 14% over the past 4.5 months.    Knowledge and beliefs deficit related to lack of previous diet education re: pancreatitis as evidenced by pt report and interest in diet education.    Nutrition Prescription: recommended general healthy lower fat diet of at least 3 meals per day but 4-6 smaller meals if better tolerated.    Nutrition Intervention:    Nutrition Education/Counseling:  Nutrition Education: Provided diet education for pancreatitis as follows:    -Follow a lowfat diet. Reviewed foods higher in fat and tips/suggestions for selecting lower  fat substitutions/foods.   -Encouraged patient to not skip meals. Can eat 4-6 smaller balanced meals/snacks per day if better tolerated. Can have protein drink/low fat oral nutrition supplement drink if unable to eat a meal.   -Discussed adequate hydration and recommended patient drink at least 48 oz-64 oz fluids per day.  -Encouraged patient to continue to avoid alcoholic beverages  --Recommended taking enzymes as directed by MD/NP/PA which is 1-3 capsules with meals/snacks (up to 12 capsules maximum per day). Can take capsules at the beginning of meals/snacks.    Additionally, discussed some general tips for diabetic diet as well such as not skipping meals and eating consistent carbohydrates at meals. Recommended pt eat at least 3 lower fat meals per day but can eat 4-6 if better tolerated.    Educational Materials Provided:  Gave patient low fat diet and pancreatitis diet education handouts  Patient verbalized understanding of education provided. See Goals below.     Goals:  -Follow a lowfat diet.   -Do not skip meals. Can eat 4-6 smaller balanced meals/snacks per day if better tolerated.   -Can have protein drink/low fat oral nutrition supplement drink if unable to eat a meal.   -Drink at least 48 oz-64 oz fluids per day.  -Continue to avoid all alcoholic beverages.  -Recommended taking enzymes as directed by MD/NP/PA which is 1-3 capsules with meals/snacks (up to 12 capsules maximum per day). Can take capsules at the beginning of meals/snacks.    Nutrition Monitoring and Evaluation: Will monitor adherence to nutrition recommendations at any future RD visits.     Further Medical Nutrition Therapy:  PRN or f/u on same day as follow up after next MD visit in clinic.  Next Appointment (if applicable):  PRN. Gave scheduling info/number.  Patient was encouraged to call/contact RD with any further questions.    Brooke Garcia, MS, RD, LD

## 2018-05-14 NOTE — TELEPHONE ENCOUNTER
Called Antoine and he is advised that we did receive more paperwork. Wanted to let him know that Dr. Beasley is out of the office this week, will have him sign it when he gets back and then send it to them.   Verbalized understanding and amenable to plan.   - Of note, this is the third attempt for paperwork as [place] sent it incorrectly the last 2 times.       Marianne BUSH RN Coordinator  Dr. Beasley, Dr. Parish & Dr. Rawls   [facility]  [phone]

## 2018-05-14 NOTE — PATIENT INSTRUCTIONS
It was nice meeting you today:    -Follow a lowfat diet.   -Do not skip meals. Can eat 4-6 smaller balanced meals/snacks per day if better tolerated.   -Can have protein drink/low fat oral nutrition supplement drink if unable to eat a meal.   -Drink at least 48 oz-64 oz fluids per day.  -Continue to avoid all alcoholic beverages.  -Recommended taking enzymes as directed by MD/NP/PA which is 1-3 capsules with meals/snacks (up to 12 capsules maximum per day). Can take capsules at the beginning of meals/snacks.  If you would like to schedule a follow up appointment with Brooke Garcia Registered Dietitian, please call [phone].

## 2018-05-14 NOTE — MR AVS SNAPSHOT
Antoine Russo   5/14/2018 9:30 AM   Anticoagulation Therapy Visit    Description:  72 year old male   Provider:  NB ANTI COAG   Department:  [facility] Anticoag           INR as of 5/14/2018     Today's INR 2.4      Anticoagulation Summary as of 5/14/2018     INR goal 2.0-3.0   Today's INR 2.4   Full instructions 5/14: 1.25 mg; 5/15: 1.25 mg; 5/16: 1.25 mg   Next INR check 5/17/2018    Indications   Chronic atrial fibrillation (H) [I48.2]  Long-term (current) use of anticoagulants [Z79.01] [Z79.01]         Your next Anticoagulation Clinic appointment(s)     May 17, 2018 11:30 AM CDT   Anticoagulation Visit with NB ANTI COAG   [facility] ([facility])    [address] [place] [address] [phone]              Contact Numbers     Please call [phone] with any problems or questions regarding your therapy.    If you need to cancel and/or reschedule your appointment please call one of the following numbers:  [facility] [phone]  [facility] [phone]  [facility] [phone]  [facility] [phone]  [place] - [phone]            May 2018 Details    Sun Mon Tue Wed Thu Fri Sat       1               2               3               4               5                 6               7               8               9               10               11               12                 13               14      1.25 mg   See details      15      1.25 mg         16      1.25 mg         17            18               19                 20               21               22               23               24               25               26                 27               28               29               30               31                  Date Details   05/14 This INR check       Date of next INR:  5/17/2018         How to take your warfarin dose     To take:  1.25 mg Take 0.5 of a 2.5 mg tablet.

## 2018-05-17 ENCOUNTER — ANTICOAGULATION THERAPY VISIT (OUTPATIENT)
Dept: ANTICOAGULATION | Facility: CLINIC | Age: 73
End: 2018-05-17
Payer: COMMERCIAL

## 2018-05-17 DIAGNOSIS — Z79.01 LONG-TERM (CURRENT) USE OF ANTICOAGULANTS: ICD-10-CM

## 2018-05-17 DIAGNOSIS — I48.20 CHRONIC ATRIAL FIBRILLATION (H): ICD-10-CM

## 2018-05-17 LAB — INR POINT OF CARE: 1.7 (ref 0.86–1.14)

## 2018-05-17 PROCEDURE — 99207 ZZC NO CHARGE NURSE ONLY: CPT

## 2018-05-17 PROCEDURE — 36416 COLLJ CAPILLARY BLOOD SPEC: CPT

## 2018-05-17 PROCEDURE — 85610 PROTHROMBIN TIME: CPT | Mod: QW

## 2018-05-17 NOTE — PROGRESS NOTES
ANTICOAGULATION FOLLOW-UP CLINIC VISIT    Patient Name:  Antoine Russo  Date:  5/17/2018  Contact Type:  Face to Face    SUBJECTIVE:     Patient Findings     Positives Change in diet/appetite, Activity level change    Comments Patient is eating better and has gained 2 pounds recently. He is also feeling better in general and less fatigued, which may be increasing his activity level. He is not drinking any protein supplements, though. He is still taking the pancreatic enzymes and will see his gastro doctor on 6-11-18. He will discuss the longevity of using the enzymes since he will not be able to afford a refill once these are gone. Writer suspects his albumin level is increasing due to better nutrition and he will require more warfarin now. Will increase by 22% today since last increase in dosing INR went from 2.4 to 1.7 in 3 days. Patient may require a further adjustment as his diet continues to improve and he puts more weight back on.           OBJECTIVE    INR Protime   Date Value Ref Range Status   05/17/2018 1.7 (A) 0.86 - 1.14 Final       ASSESSMENT / PLAN  INR assessment SUB    Recheck INR In: 1 WEEK    INR Location Clinic      Anticoagulation Summary as of 5/17/2018     INR goal 2.0-3.0   Today's INR 1.7!   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

## 2018-05-17 NOTE — MR AVS SNAPSHOT
Antoine JESSICA Russo   5/17/2018 11:30 AM   Anticoagulation Therapy Visit    Description:  72 year old male   Provider:  NB ANTI COAG   Department:  [facility] Anticoag           INR as of 5/17/2018     Today's INR 1.7!      Anticoagulation Summary as of 5/17/2018     INR goal 2.0-3.0   Today's INR 1.7!   Full instructions 2.5 mg on Mon, Thu; 1.25 mg all other days   Next INR check 5/24/2018    Indications   Chronic atrial fibrillation (H) [I48.2]  Long-term (current) use of anticoagulants [Z79.01] [Z79.01]         Your next Anticoagulation Clinic appointment(s)     May 24, 2018 11:30 AM CDT   [facility] with [facility]AG   [facility]    [address] [place] [id number] [phone]              Contact Numbers     Please call [phone] with any problems or questions regarding your therapy.    If you need to cancel and/or reschedule your appointment please call one of the following numbers:  [facility] [phone]  [facility] [phone]  [place] - [phone]  [facility] [phone]  [place] - [phone]            May 2018 Details    Sun Mon Tue Wed Thu Fri Sat       1               2               3               4               5                 6               7               8               9               10               11               12                 13               14               15               16               17      2.5 mg   See details      18      1.25 mg         19      1.25 mg           20      1.25 mg         21      2.5 mg         22      1.25 mg         23      1.25 mg         24            25               26                 27               28               29               30               31                  Date Details   05/17 This INR check       Date of next INR:  5/24/2018         How to take your warfarin dose     To take:  1.25 mg Take 0.5 of a 2.5 mg tablet.    To take:  2.5 mg Take 1 of  the 2.5 mg tablets.

## 2018-05-22 ENCOUNTER — OFFICE VISIT (OUTPATIENT)
Dept: FAMILY MEDICINE | Facility: CLINIC | Age: 73
End: 2018-05-22
Payer: COMMERCIAL

## 2018-05-22 VITALS
HEART RATE: 76 BPM | DIASTOLIC BLOOD PRESSURE: 62 MMHG | WEIGHT: 177 LBS | RESPIRATION RATE: 16 BRPM | TEMPERATURE: 97.4 F | SYSTOLIC BLOOD PRESSURE: 120 MMHG | BODY MASS INDEX: 25.4 KG/M2 | OXYGEN SATURATION: 98 %

## 2018-05-22 DIAGNOSIS — E11.42 TYPE 2 DIABETES MELLITUS WITH DIABETIC POLYNEUROPATHY, WITHOUT LONG-TERM CURRENT USE OF INSULIN (H): Chronic | ICD-10-CM

## 2018-05-22 DIAGNOSIS — L98.9 SKIN LESION: ICD-10-CM

## 2018-05-22 DIAGNOSIS — I10 HYPERTENSION, BENIGN ESSENTIAL, GOAL BELOW 140/90: ICD-10-CM

## 2018-05-22 DIAGNOSIS — I48.20 CHRONIC ATRIAL FIBRILLATION (H): Chronic | ICD-10-CM

## 2018-05-22 DIAGNOSIS — K85.91 NECROTIZING PANCREATITIS: Primary | ICD-10-CM

## 2018-05-22 LAB
ALBUMIN SERPL-MCNC: 2.8 G/DL (ref 3.4–5)
ALP SERPL-CCNC: 68 U/L (ref 40–150)
ALT SERPL W P-5'-P-CCNC: 11 U/L (ref 0–70)
ANION GAP SERPL CALCULATED.3IONS-SCNC: 4 MMOL/L (ref 3–14)
AST SERPL W P-5'-P-CCNC: 11 U/L (ref 0–45)
BILIRUB SERPL-MCNC: 0.4 MG/DL (ref 0.2–1.3)
BUN SERPL-MCNC: 13 MG/DL (ref 7–30)
CALCIUM SERPL-MCNC: 9.4 MG/DL (ref 8.5–10.1)
CHLORIDE SERPL-SCNC: 102 MMOL/L (ref 94–109)
CO2 SERPL-SCNC: 30 MMOL/L (ref 20–32)
CREAT SERPL-MCNC: 0.88 MG/DL (ref 0.66–1.25)
ERYTHROCYTE [DISTWIDTH] IN BLOOD BY AUTOMATED COUNT: 17.2 % (ref 10–15)
GFR SERPL CREATININE-BSD FRML MDRD: 85 ML/MIN/1.7M2
GLUCOSE SERPL-MCNC: 155 MG/DL (ref 70–99)
HBA1C MFR BLD: 7.7 % (ref 0–5.6)
HCT VFR BLD AUTO: 35.3 % (ref 40–53)
HGB BLD-MCNC: 11 G/DL (ref 13.3–17.7)
MCH RBC QN AUTO: 31.1 PG (ref 26.5–33)
MCHC RBC AUTO-ENTMCNC: 31.2 G/DL (ref 31.5–36.5)
MCV RBC AUTO: 100 FL (ref 78–100)
PLATELET # BLD AUTO: 322 10E9/L (ref 150–450)
POTASSIUM SERPL-SCNC: 4.4 MMOL/L (ref 3.4–5.3)
PROT SERPL-MCNC: 7.5 G/DL (ref 6.8–8.8)
RBC # BLD AUTO: 3.54 10E12/L (ref 4.4–5.9)
SODIUM SERPL-SCNC: 136 MMOL/L (ref 133–144)
WBC # BLD AUTO: 11.6 10E9/L (ref 4–11)

## 2018-05-22 PROCEDURE — 99214 OFFICE O/P EST MOD 30 MIN: CPT | Performed by: FAMILY MEDICINE

## 2018-05-22 PROCEDURE — 83036 HEMOGLOBIN GLYCOSYLATED A1C: CPT | Performed by: FAMILY MEDICINE

## 2018-05-22 PROCEDURE — 85027 COMPLETE CBC AUTOMATED: CPT | Performed by: FAMILY MEDICINE

## 2018-05-22 PROCEDURE — 36415 COLL VENOUS BLD VENIPUNCTURE: CPT | Performed by: FAMILY MEDICINE

## 2018-05-22 PROCEDURE — 80053 COMPREHEN METABOLIC PANEL: CPT | Performed by: FAMILY MEDICINE

## 2018-05-22 RX ORDER — TRIAMCINOLONE ACETONIDE 1 MG/G
CREAM TOPICAL
Qty: 80 G | Refills: 0 | Status: SHIPPED | OUTPATIENT
Start: 2018-05-22 | End: 2018-08-23

## 2018-05-22 RX ORDER — METOPROLOL SUCCINATE 50 MG/1
75 TABLET, EXTENDED RELEASE ORAL 2 TIMES DAILY
Qty: 270 TABLET | Refills: 3 | Status: SHIPPED | OUTPATIENT
Start: 2018-05-22 | End: 2019-06-08

## 2018-05-22 NOTE — PROGRESS NOTES
SUBJECTIVE:   Antoine Russo is a 72 year old male who presents to clinic today for the following health issues:      Pancreatitis follow up on weight     He saw the nutritionist on 5/14/2018.  He is taking pancreatic enzymes. Feels much better, appetite is back.   Is following a low fat diet.     Wt Readings from Last 5 Encounters:   05/22/18 177 lb (80.3 kg)   05/14/18 173 lb (78.5 kg)   05/08/18 171 lb (77.6 kg)   05/07/18 174 lb 4.8 oz (79.1 kg)   04/30/18 172 lb (78 kg)      He will see Dr. Beasley in follow-up on 6/11/18.    Hypertension-blood pressure since got out of hospital had gotten very low.  I had him stop the lisinopril.  BP Readings from Last 6 Encounters:   05/22/18 120/62   05/08/18 98/62   05/07/18 98/53   04/30/18 120/60   04/24/18 115/54   04/21/18 100/60      afib-doesn't know when is in it, he had a rapid heartbeat in emergency department so I had increased his metoprolol to 75 mg bid, he'll need refills     Diabetes-on metformin  Lab Results   Component Value Date    A1C 7.7 03/16/2018    A1C 8.1 01/23/2018    A1C 7.0 10/10/2017    A1C 6.4 07/13/2017    A1C 8.9 04/04/2017        Problem list and histories reviewed & adjusted, as indicated.  Additional history: as documented    BP Readings from Last 3 Encounters:   05/22/18 120/62   05/08/18 98/62   05/07/18 98/53    Wt Readings from Last 3 Encounters:   05/22/18 177 lb (80.3 kg)   05/14/18 173 lb (78.5 kg)   05/08/18 171 lb (77.6 kg)             Reviewed and updated as needed this visit by clinical staff  Tobacco  Allergies  Meds       Reviewed and updated as needed this visit by Provider         ROS:  CONSTITUTIONAL: NEGATIVE for fever, chills, change in weight  ENT/MOUTH: NEGATIVE for ear, mouth and throat problems  RESP: NEGATIVE for significant cough or SOB  CV: NEGATIVE for chest pain, palpitations or peripheral edema  GI: NEGATIVE for nausea, abdominal pain, heartburn, or change in bowel habits  : negative for dysuria,  hematuria, decreased urinary stream    OBJECTIVE:                                                    /62 (BP Location: Right arm)  Pulse 76  Temp 97.4  F (36.3  C) (Tympanic)  Resp 16  Wt 177 lb (80.3 kg)  SpO2 98%  BMI 25.4 kg/m2  Body mass index is 25.4 kg/(m^2).  GENERAL: healthy, alert, well nourished, well hydrated, no distress  HENT: ear canals- normal; TMs- normal; Nose- normal; Mouth- no ulcers, no lesions  NECK: no tenderness, no adenopathy, no asymmetry, no masses, no stiffness; thyroid- normal to palpation  RESP: lungs clear to auscultation - no rales, no rhonchi, no wheezes  CV: regular rates and rhythm, normal S1 S2, no S3 or S4 and no murmur, no click or rub -  ABDOMEN: soft, no tenderness, multiple well healed scars, no  hepatosplenomegaly, no masses, normal bowel sounds  MS: extremities- no gross deformities noted, no edema  Skin: area on either side of the lowergluteal cleft with some erythema, nothing open     ASSESSMENT/PLAN:                                                      ASSESSMENT:   1. Necrotizing pancreatitis    2. Hypertension, benign essential, goal below 140/90    3. Chronic atrial fibrillation (H)    4. Type 2 diabetes mellitus with diabetic polyneuropathy, without long-term current use of insulin (H)    5. Skin lesion        PLAN:  Orders Placed This Encounter     Hemoglobin A1c     CBC with platelets     Comprehensive metabolic panel     metoprolol succinate (TOPROL-XL) 50 MG 24 hr tablet     triamcinolone (KENALOG) 0.1 % cream       Patient Instructions   Use a barrier cream to buttocks twice a day or more  Use the steroid cream twice a day as well    A1C was fine at 7.7    See you in a month or so in [place]         Katie Gross MD  [facility]

## 2018-05-22 NOTE — PATIENT INSTRUCTIONS
Use a barrier cream to buttocks twice a day or more  Use the steroid cream twice a day as well    A1C was fine at 7.7    See you in a month or so in [place]

## 2018-05-22 NOTE — MR AVS SNAPSHOT
After Visit Summary   5/22/2018    Antoine Russo    MRN: [id number]           Patient Information     Date Of Birth          1945        Visit Information        Provider Department      5/22/2018 9:00 AM Katie Gross MD [facility]        Today's Diagnoses     Necrotizing pancreatitis    -  1    Hypertension, benign essential, goal below 140/90        Chronic atrial fibrillation (H)        Type 2 diabetes mellitus with diabetic polyneuropathy, without long-term current use of insulin (H)        Skin lesion          Care Instructions    Use a barrier cream to buttocks twice a day or more  Use the steroid cream twice a day as well    A1C was fine at 7.7    See you in a month or so in [place]              Follow-ups after your visit        Your next 10 appointments already scheduled     May 24, 2018 11:30 AM CDT   Anticoagulation Visit with NB ANTI COAG   [facility]    [address] [place] [id number]   [phone]            Jun 11, 2018  1:30 PM CDT   (Arrive by 1:15 PM)   Return Visit with Yovanny Beasley MD   [facility] ([facility])    [address] [place] [id number] [phone]            Jul 09, 2018  1:30 PM CDT   (Arrive by 1:15 PM)   Return Visit with Yovanny Beasley MD   [facility] ([facility])    [address] [place] [id number] [phone]              Who to contact     If you have questions or need follow up information about today's clinic visit or your schedule please contact [facility] directly at [phone].  Normal or non-critical lab and imaging results will be communicated to you by MyChart, letter or phone within 4 business days after the clinic has received the results. If you do not hear from us within 7  days, please contact the clinic through [employer] or phone. If you have a critical or abnormal lab result, we will notify you by phone as soon as possible.  Submit refill requests through [employer] or call your pharmacy and they will forward the refill request to us. Please allow 3 business days for your refill to be completed.          Additional Information About Your Visit        [employer] Information     [employer] gives you secure access to your electronic health record. If you see a primary care provider, you can also send messages to your care team and make appointments. If you have questions, please call your primary care clinic.  If you do not have a primary care provider, please call [phone] and they will assist you.        Care EveryWhere ID     This is your Care EveryWhere ID. This could be used by other organizations to access your [facility] medical records  [id number]        Your Vitals Were     Pulse Temperature Respirations Pulse Oximetry BMI (Body Mass Index)       76 97.4  F (36.3  C) (Tympanic) 16 98% 25.4 kg/m2        Blood Pressure from Last 3 Encounters:   05/22/18 120/62   05/08/18 98/62   05/07/18 98/53    Weight from Last 3 Encounters:   05/22/18 177 lb (80.3 kg)   05/14/18 173 lb (78.5 kg)   05/08/18 171 lb (77.6 kg)              We Performed the Following     CBC with platelets     Comprehensive metabolic panel     Hemoglobin A1c          Today's Medication Changes          These changes are accurate as of 5/22/18 10:01 AM.  If you have any questions, ask your nurse or doctor.               Start taking these medicines.        Dose/Directions    triamcinolone 0.1 % cream   Commonly known as:  KENALOG   Used for:  Skin lesion   Started by:  Katie Gross MD        Apply sparingly to buttocks area two times daily as needed up to 14 days   Quantity:  80 g   Refills:  0         These medicines have changed or have updated prescriptions.        Dose/Directions    gabapentin 300 MG capsule    Commonly known as:  NEURONTIN   This may have changed:    - how much to take  - how to take this  - when to take this  - additional instructions   Used for:  Type 2 diabetes mellitus with diabetic polyneuropathy, without long-term current use of insulin (H)        Take 2 tablest (600 mg) every night   Quantity:  180 capsule   Refills:  1       metoprolol succinate 50 MG 24 hr tablet   Commonly known as:  TOPROL-XL   This may have changed:  how much to take   Used for:  Hypertension, benign essential, goal below 140/90   Changed by:  Katie Gross MD        Dose:  75 mg   Take 1.5 tablets (75 mg) by mouth 2 times daily   Quantity:  270 tablet   Refills:  3            Where to get your medicines      These medications were sent to [facility] [address] [place] [address] [address] [place] [address]  [address] [address]     Phone:  [phone]     metoprolol succinate 50 MG 24 hr tablet    triamcinolone 0.1 % cream                Primary Care Provider Office Phone # Fax #    Katie Gross [phone]       [address] [place] [id number]        Goals        General    Healthy Eating (pt-stated)     Notes - Note edited  4/26/2018 11:23 AM by Coreen Craig RN    Patient states he would like to gain knowledge and advice on proper diet recommendations for his given diagnosis; he is meeting with the dietician tomorrow to review this in detail.  RN CC will be available in the future if needed.     4/26/18: Patient saw the dietician and is trying to figure out what will work for him regarding his diet and increasing protein.             Equal Access to Services     [facility] Center[facility] : Raquel Bernstein, wazoraidada lumiguel angel, qaybta kaalmada byron, koffi fay. So [facility] [phone].    ATENCIÓN: Si habla español, tiene a snyder disposición servicios gratuitos de asistencia lingüística. Allaname al [phone].    We comply with applicable federal civil rights  laws and [place] laws. We do not discriminate on the basis of race, color, national origin, age, disability, sex, sexual orientation, or gender identity.            Thank you!     Thank you for choosing [facility]  for your care. Our goal is always to provide you with excellent care. Hearing back from our patients is one way we can continue to improve our services. Please take a few minutes to complete the written survey that you may receive in the mail after your visit with us. Thank you!             Your Updated Medication List - Protect others around you: Learn how to safely use, store and throw away your medicines at www.disposemymeds.org.          This list is accurate as of 5/22/18 10:01 AM.  Always use your most recent med list.                   Brand Name Dispense Instructions for use Diagnosis    amylase-lipase-protease 11433 units Cpep    CREON    360 capsule    Take 1-3 with snacks/meals, up to 12 per day.    Acute pancreatitis with uninfected necrosis, unspecified pancreatitis type       ASPIRIN NOT PRESCRIBED    INTENTIONAL     Antiplatelet medication not prescribed intentionally due to Current anticoagulant therapy (warfarin/enoxaparin)        gabapentin 300 MG capsule    NEURONTIN    180 capsule    Take 2 tablest (600 mg) every night    Type 2 diabetes mellitus with diabetic polyneuropathy, without long-term current use of insulin (H)       glipiZIDE 2.5 MG 24 hr tablet    glipiZIDE XL    90 tablet    Take 1 tablet (2.5 mg) by mouth every morning    Type 2 diabetes mellitus with diabetic polyneuropathy, without long-term current use of insulin (H)       HYDROmorphone 2 MG tablet    DILAUDID    10 tablet    Take 1 tablet (2 mg) by mouth every 3 hours as needed for moderate to severe pain    Acute recurrent pancreatitis       lisinopril 10 MG tablet    PRINIVIL/ZESTRIL    90 tablet    Take 1 tablet (10 mg) by mouth daily    Persistent proteinuria       metFORMIN 500 MG 24 hr tablet     GLUCOPHAGE-XR    360 tablet    Take 2 tablets (1,000 mg) by mouth 2 times daily (with meals)    Type 2 diabetes mellitus with diabetic polyneuropathy, without long-term current use of insulin (H)       metoprolol succinate 50 MG 24 hr tablet    TOPROL-XL    270 tablet    Take 1.5 tablets (75 mg) by mouth 2 times daily    Hypertension, benign essential, goal below 140/90       multivitamin, therapeutic with minerals Tabs tablet     30 each    Take 1 tablet by mouth daily.    Surgery aftercare       tamsulosin 0.4 MG capsule    FLOMAX    90 capsule    Take 1 capsule (0.4 mg) by mouth daily    Urgency of urination, Hypertrophy of prostate without urinary obstruction       triamcinolone 0.1 % cream    KENALOG    80 g    Apply sparingly to buttocks area two times daily as needed up to 14 days    Skin lesion       * WARFARIN SODIUM PO      Take 2.5 mg by mouth Sun, Mon, Wed, Thurs, Fri        * WARFARIN SODIUM PO      Take 1.25 mg by mouth Tues, Sat        * Notice:  This list has 2 medication(s) that are the same as other medications prescribed for you. Read the directions carefully, and ask your doctor or other care provider to review them with you.

## 2018-05-24 ENCOUNTER — ANTICOAGULATION THERAPY VISIT (OUTPATIENT)
Dept: ANTICOAGULATION | Facility: CLINIC | Age: 73
End: 2018-05-24
Payer: COMMERCIAL

## 2018-05-24 DIAGNOSIS — I48.20 CHRONIC ATRIAL FIBRILLATION (H): ICD-10-CM

## 2018-05-24 DIAGNOSIS — Z79.01 LONG-TERM (CURRENT) USE OF ANTICOAGULANTS: ICD-10-CM

## 2018-05-24 LAB — INR POINT OF CARE: 1.7 (ref 0.86–1.14)

## 2018-05-24 PROCEDURE — 99207 ZZC NO CHARGE NURSE ONLY: CPT

## 2018-05-24 PROCEDURE — 36416 COLLJ CAPILLARY BLOOD SPEC: CPT

## 2018-05-24 PROCEDURE — 85610 PROTHROMBIN TIME: CPT | Mod: QW

## 2018-05-24 NOTE — PROGRESS NOTES
ANTICOAGULATION FOLLOW-UP CLINIC VISIT    Patient Name:  Antoine Russo  Date:  5/24/2018  Contact Type:  Face to Face    SUBJECTIVE:     Patient Findings     Positives Med error (patient mixed up Mon and Tues doses this week but it still equalled the same weekly total)    Comments Patient states he is feeling great. He continues to eat well and is still taking the pancreatic enzymes. He will see his gastro doctor on 6-11-18 and will discuss the longevity of using the enzymes since he will not be able to afford a refill once these are gone.     Writer suspects his albumin level is increasing due to better nutrition and he will require more warfarin now.  INR did not change with recent 22% increase in dosing. Will increase another 22% and recheck in 2 weeks. Also giving a one time dose increase today. Patient was advised to watch his greens until we can get INR back in range. He will not come for an afternoon appointment and this is the first morning availability. He will not go to lab either.           OBJECTIVE    INR Protime   Date Value Ref Range Status   05/24/2018 1.7 (A) 0.86 - 1.14 Final       ASSESSMENT / PLAN  INR assessment SUB    Recheck INR In: 2 WEEKS    INR Location Clinic      Anticoagulation Summary as of 5/24/2018     INR goal 2.0-3.0   Today's INR 1.7!   Warfarin maintenance plan 1.25 mg (2.5 mg x 0.5) on Mon, Wed, Fri; 2.5 mg (2.5 mg x 1) all other days   Full warfarin instructions 5/24: 3.75 mg; Otherwise 1.25 mg on Mon, Wed, Fri; 2.5 mg all other days   Weekly warfarin total 13.75 mg   Plan last modified Aparna Kunz RN (5/24/2018)   Next INR check 6/7/2018   Priority INR   Target end date Indefinite    Indications   Chronic atrial fibrillation (H) [I48.2]  Long-term (current) use of anticoagulants [Z79.01] [Z79.01]         Anticoagulation Episode Summary     INR check location     Preferred lab     Send INR reminders to 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

## 2018-05-25 ENCOUNTER — PATIENT OUTREACH (OUTPATIENT)
Dept: CARE COORDINATION | Facility: CLINIC | Age: 73
End: 2018-05-25

## 2018-05-25 NOTE — PROGRESS NOTES
Clinic Care Coordination Contact    Clinic Care Coordination Contact  OUTREACH    Referral Information:       Primary Diagnosis: Cardiovascular - other    Chief Complaint   Patient presents with     Clinic Care Coordination - Follow-up     Nurse: f/u         Universal Utilization: No ER or hospitalizations since the last conversation.     Utilization    Last refreshed: 5/24/2018  1:20 PM:  No Show Count (past year) 1       Last refreshed: 5/24/2018  1:20 PM:  ED visits 2       Last refreshed: 5/24/2018  1:20 PM:  Hospital admissions 5          Current as of: 5/24/2018  1:20 PM             Clinical Concerns:  Current Medical Concerns:  RN SWEETIE spoke with patient, he states he is doing fine. He states since seeing Dr. Beasley, he has been gaining weight, appetite is coming back, increase in energy. He states the Creon pills are $10/pill, he states luckily he had already met is deductible otherwise it would be $3300/month. He wonders if he is going to be on these long term or not, RN SWEETIE suggested talking with Dr. Beasley at his next OV 6/11/18, he verbalized understanding. He denies having any other questions at this time.    Current Behavioral Concerns: No concerns at this time.    Education Provided to patient: n/a      Health Maintenance Reviewed: Due/Overdue   Health Maintenance Due   Topic Date Due     EYE EXAM Q1 YEAR  05/15/2015     TETANUS IMMUNIZATION (SYSTEM ASSIGNED)  11/03/2016     COLONOSCOPY Q5 YR  12/18/2017       Clinical Pathway: None    Medication Management:  Patient is independent in medication management.    Functional Status:  Fallen 2 or more times in the past year?: No  Any fall with injury in the past year?: No    Living Situation:  Current living arrangement:: I live in a private home with spouse  Type of residence:: Private home - stairs    Diet/Exercise/Sleep:  n/a       Transportation:   Transportation: regular car        Psychosocial:  Financial/Insurance:   No concerns at this time.      Resources and Interventions:  Current Resources: n/a   ;      Goals:   Goals        General    Healthy Eating (pt-stated)     Notes - Note edited  5/25/2018 12:29 PM by Coreen Craig RN    Patient states he would like to gain knowledge and advice on proper diet recommendations for his given diagnosis; he is meeting with the dietician tomorrow to review this in detail.  RN CC will be available in the future if needed.     4/26/18: Patient saw the dietician and is trying to figure out what will work for him regarding his diet and increasing protein.   5/25/18: Starting to gain weight, getting energy.                  Patient/Caregiver understanding: n/a    Outreach Frequency: monthly  Future Appointments              In 1 week NB ANTI COAG [facility]    In 2 weeks Yovanny Beasley MD [facility]    In 3 weeks Katie Gross MD [facility]    In 1 month Yovanny Beasley MD [facility]          Plan:   Patient will continue to follow treatment plan as directed and follow up with PCP with concerns ongoing.   RN CC to f/u in one month.    Coreen Craig RN, [facility]  RN Care Coordinator  [facility]  Phone # [phone]  5/25/2018 12:38 PM

## 2018-06-07 ENCOUNTER — ANTICOAGULATION THERAPY VISIT (OUTPATIENT)
Dept: ANTICOAGULATION | Facility: CLINIC | Age: 73
End: 2018-06-07
Payer: COMMERCIAL

## 2018-06-07 DIAGNOSIS — I48.20 CHRONIC ATRIAL FIBRILLATION (H): ICD-10-CM

## 2018-06-07 DIAGNOSIS — Z79.01 LONG-TERM (CURRENT) USE OF ANTICOAGULANTS: ICD-10-CM

## 2018-06-07 LAB — INR POINT OF CARE: 1.9 (ref 0.86–1.14)

## 2018-06-07 PROCEDURE — 36416 COLLJ CAPILLARY BLOOD SPEC: CPT

## 2018-06-07 PROCEDURE — 85610 PROTHROMBIN TIME: CPT | Mod: QW

## 2018-06-07 PROCEDURE — 99207 ZZC NO CHARGE NURSE ONLY: CPT

## 2018-06-07 NOTE — PROGRESS NOTES
ANTICOAGULATION FOLLOW-UP CLINIC VISIT    Patient Name:  Antoine Russo  Date:  6/7/2018  Contact Type:  Face to Face    SUBJECTIVE:     Patient Findings     Positives No Problem Findings    Comments Patient states he is feeling great. He continues to eat well and is still taking the pancreatic enzymes. He will see his gastro doctor on 6-11-18 and will discuss the longevity of using the enzymes since he will not be able to afford a refill once these are gone. He does not think they are necessary at this point but will wait to stop them until seeing his provider.     Writer has increased warfarin dosing by 22% twice now. INR is slow to increase. His nutrition and protein levels are likely much better now that he is feeling well. Will increase maintenance dose another 9.1% to help reach above 2.0 and recheck in two weeks.           OBJECTIVE    INR Protime   Date Value Ref Range Status   06/07/2018 1.9 (A) 0.86 - 1.14 Final       ASSESSMENT / PLAN  INR assessment THER    Recheck INR In: 2 WEEKS    INR Location Clinic      Anticoagulation Summary as of 6/7/2018     INR goal 2.0-3.0   Today's INR 1.9!   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

## 2018-06-07 NOTE — MR AVS SNAPSHOT
Antoine Russo   6/7/2018 10:30 AM   Anticoagulation Therapy Visit    Description:  72 year old male   Provider:  [facility]   Department:  [facility]           INR as of 6/7/2018     Today's INR 1.9!      Anticoagulation Summary as of 6/7/2018     INR goal 2.0-3.0   Today's INR 1.9!   Full warfarin instructions 1.25 mg on Mon, Fri; 2.5 mg all other days   Next INR check 6/21/2018    Indications   Chronic atrial fibrillation (H) [I48.2]  Long-term (current) use of anticoagulants [Z79.01] [Z79.01]         Description     Start taking 1.25 mg Mon/Fri and 2.5 mg all other days      Your next Anticoagulation Clinic appointment(s)     Jun 21, 2018  9:45 AM CDT   [facility] with [facility]   [facility]    [address] [place] [address] [phone]              Contact Numbers     Please call [phone] with any problems or questions regarding your therapy.    If you need to cancel and/or reschedule your appointment please call one of the following numbers:  [facility] - [phone]  [facility] [phone]  [facility] [phone]  [facility] [phone]  [place] - [phone]            June 2018 Details    Sun Mon Tue Wed Thu Fri Sat          1               2                 3               4               5               6               7      2.5 mg   See details      8      1.25 mg         9      2.5 mg           10      2.5 mg         11      1.25 mg         12      2.5 mg         13      2.5 mg         14      2.5 mg         15      1.25 mg         16      2.5 mg           17      2.5 mg         18      1.25 mg         19      2.5 mg         20      2.5 mg         21            22               23                 24               25               26               27               28               29               30                Date Details   06/07 This INR check       Date of next INR:  6/21/2018         How  to take your warfarin dose     To take:  1.25 mg Take 0.5 of a 2.5 mg tablet.    To take:  2.5 mg Take 1 of the 2.5 mg tablets.

## 2018-06-11 ENCOUNTER — OFFICE VISIT (OUTPATIENT)
Dept: GASTROENTEROLOGY | Facility: CLINIC | Age: 73
End: 2018-06-11
Payer: COMMERCIAL

## 2018-06-11 VITALS
WEIGHT: 177.2 LBS | SYSTOLIC BLOOD PRESSURE: 155 MMHG | TEMPERATURE: 97.5 F | OXYGEN SATURATION: 100 % | BODY MASS INDEX: 25.37 KG/M2 | DIASTOLIC BLOOD PRESSURE: 70 MMHG | HEIGHT: 70 IN | HEART RATE: 71 BPM

## 2018-06-11 DIAGNOSIS — K85.91 NECROTIZING PANCREATITIS: Primary | ICD-10-CM

## 2018-06-11 ASSESSMENT — PAIN SCALES - GENERAL: PAINLEVEL: NO PAIN (0)

## 2018-06-11 NOTE — LETTER
6/11/2018       RE: Antoine Russo  [address] [address]     Dear Colleague,    Thank you for referring your patient, Antoine Russo, to the [facility] at [facility]. Please see a copy of my visit note below.    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  In March we did combined EUS/ERCP, aspirated the cyst and then placed a pancreatic biliary sphincterotomy.  Interestingly he got readmitted with severe pancreatitis with peripancreatic necrosis that resolved slowly and got bubbles in it transiently, a stent.  He is actually doing very well now.  He is eating, on pancreatic enzymes, regained some of his weight, but plateaued and having no pain issues.  He has not had an alcoholic drink since January which no doubt was a contributing factor.  He quit smoking 12 years ago.      IMPRESSION/RECOMMENDATIONS:  We spent 15 minutes, more than 50% counseling, discussing that numbers of interventions, we have done everything we could do.  It is very surprising that he got severe pancreatitis after a very traumatic procedure without pancreatic sphincterotomy, but at any rate he seems to be doing overall better now.  Hopefully, not to have more episodes of pancreatitis, etiology of which we are a little unclear, but may have been related to his persistent fluid collection off the stump of his resected pancreas that seems to now be go.  At any rate, we will see him back in followup p.r.n.  Recommend that he continue pancreatic enzymes, multivitamins and contact us for followup if he has problems.     Again, thank you for allowing me to participate in the care of your patient.      Sincerely,    Yovanny Beasley MD

## 2018-06-11 NOTE — NURSING NOTE
"Chief Complaint   Patient presents with     Pancreatitis     f/u        Vitals:    06/11/18 1347   BP: 155/70   Pulse: 71   Temp: 97.5  F (36.4  C)   TempSrc: Oral   SpO2: 100%   Weight: 80.4 kg (177 lb 3.2 oz)   Height: 1.778 m (5' 10\")       Body mass index is 25.43 kg/(m^2).      WOUND EVALUATION:                        "

## 2018-06-11 NOTE — PROGRESS NOTES
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  In March we did combined EUS/ERCP, aspirated the cyst and then placed a pancreatic biliary sphincterotomy.  Interestingly he got readmitted with severe pancreatitis with peripancreatic necrosis that resolved slowly and got bubbles in it transiently, a stent.  He is actually doing very well now.  He is eating, on pancreatic enzymes, regained some of his weight, but plateaued and having no pain issues.  He has not had an alcoholic drink since January which no doubt was a contributing factor.  He quit smoking 12 years ago.      IMPRESSION/RECOMMENDATIONS:  We spent 15 minutes, more than 50% counseling, discussing that numbers of interventions, we have done everything we could do.  It is very surprising that he got severe pancreatitis after a very traumatic procedure without pancreatic sphincterotomy, but at any rate he seems to be doing overall better now.  Hopefully, not to have more episodes of pancreatitis, etiology of which we are a little unclear, but may have been related to his persistent fluid collection off the stump of his resected pancreas that seems to now be go.  At any rate, we will see him back in followup p.r.n.  Recommend that he continue pancreatic enzymes, multivitamins and contact us for followup if he has problems.

## 2018-06-11 NOTE — MR AVS SNAPSHOT
After Visit Summary   6/11/2018    Antoine Russo    MRN: [id number]           Patient Information     Date Of Birth          1945        Visit Information        Provider Department      6/11/2018 1:30 PM Yovanny Beasley MD [facility]        Today's Diagnoses     Necrotizing pancreatitis    -  1       Follow-ups after your visit        Your next 10 appointments already scheduled     Jun 21, 2018  8:40 AM CDT   [facility]   [facility]    [address] [place] [address] [phone]           Bring a current list of meds and any records pertaining to this visit. For Physicals, please bring immunization records and any forms needing to be filled out. Please arrive 10 minutes early to complete paperwork.            Jun 21, 2018  9:45 AM CDT   Anticoagulation Visit with [facility]   [facility]    [address] [place] [address] [phone]              Who to contact     Please call your clinic at [phone] to:    Ask questions about your health    Make or cancel appointments    Discuss your medicines    Learn about your test results    Speak to your doctor            Additional Information About Your Visit        [employer] Information     [employer] gives you secure access to your electronic health record. If you see a primary care provider, you can also send messages to your care team and make appointments. If you have questions, please call your primary care clinic.  If you do not have a primary care provider, please call [phone] and they will assist you.      [employer] is an electronic gateway that provides easy, online access to your medical records. With [employer], you can request a clinic appointment, read your test results, renew a prescription or communicate with your care team.     To access your  "existing account, please contact your [facility] or call [phone] for assistance.        Care EveryWhere ID     This is your Care EveryWhere ID. This could be used by other organizations to access your [place] medical records  [id number]        Your Vitals Were     Pulse Temperature Height Pulse Oximetry BMI (Body Mass Index)       71 97.5  F (36.4  C) (Oral) 1.778 m (5' 10\") 100% 25.43 kg/m2        Blood Pressure from Last 3 Encounters:   06/11/18 155/70   05/22/18 120/62   05/08/18 98/62    Weight from Last 3 Encounters:   06/11/18 80.4 kg (177 lb 3.2 oz)   05/22/18 80.3 kg (177 lb)   05/14/18 78.5 kg (173 lb)              Today, you had the following     No orders found for display         Today's Medication Changes          These changes are accurate as of 6/11/18 11:59 PM.  If you have any questions, ask your nurse or doctor.               These medicines have changed or have updated prescriptions.        Dose/Directions    gabapentin 300 MG capsule   Commonly known as:  NEURONTIN   This may have changed:    - how much to take  - how to take this  - when to take this  - additional instructions   Used for:  Type 2 diabetes mellitus with diabetic polyneuropathy, without long-term current use of insulin (H)        Take 2 tablest (600 mg) every night   Quantity:  180 capsule   Refills:  1                Primary Care Provider Office Phone # Fax #    MichelleSary Gross [phone]       [address] [place] [address]        Goals        General    Healthy Eating (pt-stated)     Notes - Note edited  5/25/2018 12:29 PM by Coreen Craig RN    Patient states he would like to gain knowledge and advice on proper diet recommendations for his given diagnosis; he is meeting with the dietician tomorrow to review this in detail.  RN CC will be available in the future if needed.     4/26/18: Patient saw the dietician and is trying to figure out what will work for him " regarding his diet and increasing protein.   5/25/18: Starting to gain weight, getting energy.            Equal Access to Services     ABBEY WALTON : Raquel Bernstein, charli block, franciscomckenna fajardotobiasdae matthews, koffi recinosjoseelijah fay. So [facility] [phone].    ATENCIÓN: Si habla español, tiene a snyder disposición servicios gratuitos de asistencia lingüística. Llame al [phone].    We comply with applicable federal civil rights laws and [place] laws. We do not discriminate on the basis of race, color, national origin, age, disability, sex, sexual orientation, or gender identity.            Thank you!     Thank you for choosing [facility]CRE[facility]IL[facility]  for your care. Our goal is always to provide you with excellent care. Hearing back from our patients is one way we can continue to improve our services. Please take a few minutes to complete the written survey that you may receive in the mail after your visit with us. Thank you!             Your Updated Medication List - Protect others around you: Learn how to safely use, store and throw away your medicines at www.disposemymeds.org.          This list is accurate as of 6/11/18 11:59 PM.  Always use your most recent med list.                   Brand Name Dispense Instructions for use Diagnosis    amylase-lipase-protease 60344 units Cpep    CREON    360 capsule    Take 1-3 with snacks/meals, up to 12 per day.    Acute pancreatitis with uninfected necrosis, unspecified pancreatitis type       ASPIRIN NOT PRESCRIBED    INTENTIONAL     Antiplatelet medication not prescribed intentionally due to Current anticoagulant therapy (warfarin/enoxaparin)        gabapentin 300 MG capsule    NEURONTIN    180 capsule    Take 2 tablest (600 mg) every night    Type 2 diabetes mellitus with diabetic polyneuropathy, without long-term current use of insulin (H)       glipiZIDE 2.5 MG 24 hr tablet    glipiZIDE XL    90 tablet    Take 1 tablet (2.5 mg)  by mouth every morning    Type 2 diabetes mellitus with diabetic polyneuropathy, without long-term current use of insulin (H)       HYDROmorphone 2 MG tablet    DILAUDID    10 tablet    Take 1 tablet (2 mg) by mouth every 3 hours as needed for moderate to severe pain    Acute recurrent pancreatitis       lisinopril 10 MG tablet    PRINIVIL/ZESTRIL    90 tablet    Take 1 tablet (10 mg) by mouth daily    Persistent proteinuria       metFORMIN 500 MG 24 hr tablet    GLUCOPHAGE-XR    360 tablet    Take 2 tablets (1,000 mg) by mouth 2 times daily (with meals)    Type 2 diabetes mellitus with diabetic polyneuropathy, without long-term current use of insulin (H)       metoprolol succinate 50 MG 24 hr tablet    TOPROL-XL    270 tablet    Take 1.5 tablets (75 mg) by mouth 2 times daily    Hypertension, benign essential, goal below 140/90       multivitamin, therapeutic with minerals Tabs tablet     30 each    Take 1 tablet by mouth daily.    Surgery aftercare       tamsulosin 0.4 MG capsule    FLOMAX    90 capsule    Take 1 capsule (0.4 mg) by mouth daily    Urgency of urination, Hypertrophy of prostate without urinary obstruction       triamcinolone 0.1 % cream    KENALOG    80 g    Apply sparingly to buttocks area two times daily as needed up to 14 days    Skin lesion       WARFARIN SODIUM PO      Take 1.25 mg by mouth Monday and Friday and 2.5 mg all other days or as directed by [facility]

## 2018-06-21 ENCOUNTER — OFFICE VISIT (OUTPATIENT)
Dept: FAMILY MEDICINE | Facility: CLINIC | Age: 73
End: 2018-06-21
Payer: COMMERCIAL

## 2018-06-21 ENCOUNTER — ANTICOAGULATION THERAPY VISIT (OUTPATIENT)
Dept: ANTICOAGULATION | Facility: CLINIC | Age: 73
End: 2018-06-21
Payer: COMMERCIAL

## 2018-06-21 VITALS
SYSTOLIC BLOOD PRESSURE: 139 MMHG | RESPIRATION RATE: 16 BRPM | HEART RATE: 73 BPM | WEIGHT: 176 LBS | TEMPERATURE: 97 F | OXYGEN SATURATION: 97 % | DIASTOLIC BLOOD PRESSURE: 80 MMHG | BODY MASS INDEX: 25.25 KG/M2

## 2018-06-21 DIAGNOSIS — C04.0 MALIGNANT NEOPLASM OF ANTERIOR PORTION OF FLOOR OF MOUTH (H): Chronic | ICD-10-CM

## 2018-06-21 DIAGNOSIS — E11.42 TYPE 2 DIABETES MELLITUS WITH DIABETIC POLYNEUROPATHY, WITHOUT LONG-TERM CURRENT USE OF INSULIN (H): Primary | Chronic | ICD-10-CM

## 2018-06-21 DIAGNOSIS — Z79.01 LONG-TERM (CURRENT) USE OF ANTICOAGULANTS: ICD-10-CM

## 2018-06-21 DIAGNOSIS — I10 HYPERTENSION, BENIGN ESSENTIAL, GOAL BELOW 140/90: Chronic | ICD-10-CM

## 2018-06-21 DIAGNOSIS — I48.20 CHRONIC ATRIAL FIBRILLATION (H): Chronic | ICD-10-CM

## 2018-06-21 DIAGNOSIS — I48.20 CHRONIC ATRIAL FIBRILLATION (H): ICD-10-CM

## 2018-06-21 DIAGNOSIS — L89.312 DECUBITUS ULCER OF RIGHT BUTTOCK, STAGE 2 (H): ICD-10-CM

## 2018-06-21 LAB — INR POINT OF CARE: 1.7 (ref 0.86–1.14)

## 2018-06-21 PROCEDURE — 36416 COLLJ CAPILLARY BLOOD SPEC: CPT

## 2018-06-21 PROCEDURE — 85610 PROTHROMBIN TIME: CPT | Mod: QW

## 2018-06-21 PROCEDURE — 99214 OFFICE O/P EST MOD 30 MIN: CPT | Performed by: FAMILY MEDICINE

## 2018-06-21 PROCEDURE — 99207 ZZC NO CHARGE NURSE ONLY: CPT

## 2018-06-21 RX ORDER — MUPIROCIN 20 MG/G
OINTMENT TOPICAL 2 TIMES DAILY
Qty: 22 G | Refills: 1 | Status: SHIPPED | OUTPATIENT
Start: 2018-06-21 | End: 2018-06-26

## 2018-06-21 NOTE — NURSING NOTE
"Chief Complaint   Patient presents with     Pancreatitis     1 month recheck       Initial /80  Pulse 73  Temp 97  F (36.1  C) (Tympanic)  Resp 16  Wt 176 lb (79.8 kg)  SpO2 97%  BMI 25.25 kg/m2 Estimated body mass index is 25.25 kg/(m^2) as calculated from the following:    Height as of 6/11/18: 5' 10\" (1.778 m).    Weight as of this encounter: 176 lb (79.8 kg).      Health Maintenance that is potentially due pending provider review:  Colonoscopy/FIT    Pt declines to have colonoscopy.    Is there anyone who you would like to be able to receive your results? No  If yes have patient fill out VIDYA    "

## 2018-06-21 NOTE — PROGRESS NOTES
ANTICOAGULATION FOLLOW-UP CLINIC VISIT    Patient Name:  Antoine Russo  Date:  6/21/2018  Contact Type:  Face to Face    SUBJECTIVE:     Patient Findings     Positives Unexplained INR or factor level change    Comments Patient remains sub-therapeutic. He denies any missed doses. He states no changes since his last visit 2 weeks ago. He saw the GI doctor and is too continue on Pancreatic Enzymes and Multivitamins. Today he will see his PCP. I increased maintenance dose from 15 mg to 17.5 mg for a 16.7% increase. Patient will f/u in 2 weeks.            OBJECTIVE    INR Protime   Date Value Ref Range Status   06/21/2018 1.7 (A) 0.86 - 1.14 Final       ASSESSMENT / PLAN  INR assessment SUB    Recheck INR In: 2 WEEKS    INR Location Clinic      Anticoagulation Summary as of 6/21/2018     INR goal 2.0-3.0   Today's INR 1.7!   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

## 2018-06-21 NOTE — MR AVS SNAPSHOT
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)        Recurrent pancreatitis (H)        Long-term (current) use of anticoagulants [Z79.01]        Decubitus ulcer of right buttock, stage 2          Care Instructions    Use the antibiotic ointment on the buttocks  Off load pressure    See you in 2 months, earlier if needed    Restart the lisinopril at 5 mg daily (half pill)              Follow-ups after your visit        Your next 10 appointments already scheduled     Jun 21, 2018  9:45 AM CDT   Anticoagulation Visit with NB ANTI COAG   [facility]    [address] [place] [id number] [phone]              Who to contact     If you have questions or need follow up information about today's clinic visit or your schedule please contact [facility] directly at [phone].  Normal or non-critical lab and imaging results will be communicated to you by MyChart, letter or phone within 4 business days after the clinic has received the results. If you do not hear from us within 7 days, please contact the clinic through MyChart or phone. If you have a critical or abnormal lab result, we will notify you by phone as soon as possible.  Submit refill requests through [employer] or call your pharmacy and they will forward the refill request to us. Please allow 3 business days for your refill to be completed.           Additional Information About Your Visit        [employer] Information     [employer] gives you secure access to your electronic health record. If you see a primary care provider, you can also send messages to your care team and make appointments. If you have questions, please call your primary care clinic.  If you do not have a primary care provider, please call [phone] and they will assist you.        Care EveryWhere ID     This is your Care EveryWhere ID. This could be used by other organizations to access your [place] medical records  [id number]        Your Vitals Were     Pulse Temperature Respirations Pulse Oximetry BMI (Body Mass Index)       73 97  F (36.1  C) (Tympanic) 16 97% 25.25 kg/m2        Blood Pressure from Last 3 Encounters:   06/21/18 139/80   06/11/18 155/70   05/22/18 120/62    Weight from Last 3 Encounters:   06/21/18 176 lb (79.8 kg)   06/11/18 177 lb 3.2 oz (80.4 kg)   05/22/18 177 lb (80.3 kg)              Today, you had the following     No orders found for display         Today's Medication Changes          These changes are accurate as of 6/21/18  9:12 AM.  If you have any questions, ask your nurse or doctor.               Start taking these medicines.        Dose/Directions    mupirocin 2 % ointment   Commonly known as:  BACTROBAN   Used for:  Decubitus ulcer of right buttock, stage 2   Started by:  Katie Gross MD        Apply topically 2 times daily for 5 days   Quantity:  22 g   Refills:  1         These medicines have changed or have updated prescriptions.        Dose/Directions    gabapentin 300 MG capsule   Commonly known as:  NEURONTIN   This may have changed:    - how much to take  - how to take this  - when to take this  - additional instructions   Used for:  Type 2 diabetes mellitus with diabetic polyneuropathy, without long-term current use of insulin (H)        Take 2 tablest (600 mg) every night   Quantity:  180 capsule   Refills:  1         Stop taking these  medicines if you haven't already. Please contact your care team if you have questions.     glipiZIDE 2.5 MG 24 hr tablet   Commonly known as:  glipiZIDE XL   Stopped by:  Katie Gross MD           HYDROmorphone 2 MG tablet   Commonly known as:  DILAUDID   Stopped by:  Katie Gross MD           lisinopril 10 MG tablet   Commonly known as:  PRINIVIL/ZESTRIL   Stopped by:  Katie Gross MD                Where to get your medicines      These medications were sent to [id number] [address]  [address] [facility] [address]     Phone:  [phone]     mupirocin 2 % ointment                Primary Care Provider Office Phone # Fax #    Katie Gross [phone]       [address] [facility] [address]        Goals        General    Healthy Eating (pt-stated)     Notes - Note edited  5/25/2018 12:29 PM by Coreen Craig, RN    Patient states he would like to gain knowledge and advice on proper diet recommendations for his given diagnosis; he is meeting with the dietician tomorrow to review this in detail.  RN CC will be available in the future if needed.     4/26/18: Patient saw the dietician and is trying to figure out what will work for him regarding his diet and increasing protein.   5/25/18: Starting to gain weight, getting energy.            Equal Access to Services     [facility]: Hadii stoney matthew hadasho Sotomy, waaxda luqadaha, qaybta kaalmada anitada, koffi davidson . So [facility] [phone].    ATENCIÓN: Si habla español, tiene a snyder disposición servicios gratuitos de asistencia lingüística. Llame al [phone].    We comply with applicable federal civil rights laws and [place] laws. We do not discriminate on the basis of race, color, national origin, age, disability, sex, sexual orientation, or gender identity.            Thank you!     Thank you for choosing [facility]  for your care.  Our goal is always to provide you with excellent care. Hearing back from our patients is one way we can continue to improve our services. Please take a few minutes to complete the written survey that you may receive in the mail after your visit with us. Thank you!             Your Updated Medication List - Protect others around you: Learn how to safely use, store and throw away your medicines at www.disposemymeds.org.          This list is accurate as of 6/21/18  9:12 AM.  Always use your most recent med list.                   Brand Name Dispense Instructions for use Diagnosis    amylase-lipase-protease 86054 units Cpep    CREON    360 capsule    Take 1-3 with snacks/meals, up to 12 per day.    Acute pancreatitis with uninfected necrosis, unspecified pancreatitis type       ASPIRIN NOT PRESCRIBED    INTENTIONAL     Antiplatelet medication not prescribed intentionally due to Current anticoagulant therapy (warfarin/enoxaparin)        gabapentin 300 MG capsule    NEURONTIN    180 capsule    Take 2 tablest (600 mg) every night    Type 2 diabetes mellitus with diabetic polyneuropathy, without long-term current use of insulin (H)       metFORMIN 500 MG 24 hr tablet    GLUCOPHAGE-XR    360 tablet    Take 2 tablets (1,000 mg) by mouth 2 times daily (with meals)    Type 2 diabetes mellitus with diabetic polyneuropathy, without long-term current use of insulin (H)       metoprolol succinate 50 MG 24 hr tablet    TOPROL-XL    270 tablet    Take 1.5 tablets (75 mg) by mouth 2 times daily    Hypertension, benign essential, goal below 140/90       multivitamin, therapeutic with minerals Tabs tablet     30 each    Take 1 tablet by mouth daily.    Surgery aftercare       mupirocin 2 % ointment    BACTROBAN    22 g    Apply topically 2 times daily for 5 days    Decubitus ulcer of right buttock, stage 2       tamsulosin 0.4 MG capsule    FLOMAX    90 capsule    Take 1 capsule (0.4 mg) by mouth daily    Urgency of urination,  Hypertrophy of prostate without urinary obstruction       triamcinolone 0.1 % cream    KENALOG    80 g    Apply sparingly to buttocks area two times daily as needed up to 14 days    Skin lesion       WARFARIN SODIUM PO      Take 1.25 mg by mouth Monday and Friday and 2.5 mg all other days or as directed by [facility]

## 2018-06-21 NOTE — MR AVS SNAPSHOT
Antoine Russo   6/21/2018 9:45 AM   Anticoagulation Therapy Visit    Description:  72 year old male   Provider:  NB ANTI COAG   Department:  Nb Anticoag           INR as of 6/21/2018     Today's INR 1.7!      Anticoagulation Summary as of 6/21/2018     INR goal 2.0-3.0   Today's INR 1.7!   Full warfarin instructions 2.5 mg every day   Next INR check 7/5/2018    Indications   Chronic atrial fibrillation (H) [I48.2]  Long-term (current) use of anticoagulants [Z79.01] [Z79.01]         Your next Anticoagulation Clinic appointment(s)     Jul 05, 2018  9:30 AM CDT   An[facility]agulation Visit with NB ANTI COAG   [facility]    [address] [place] [address] [phone]              Contact Numbers     Please call [phone] with any problems or questions regarding your therapy.    If you need to cancel and/or reschedule your appointment please call one of the following numbers:  [facility] [phone]  [facility] [phone]  [facility] [phone]  [facility] [phone]  [place] - [phone]            June 2018 Details    Sun Mon Tue Wed Thu Fri Sat          1               2                 3               4               5               6               7               8               9                 10               11               12               13               14               15               16                 17               18               19               20               21      2.5 mg   See details      22      2.5 mg         23      2.5 mg           24      2.5 mg         25      2.5 mg         26      2.5 mg         27      2.5 mg         28      2.5 mg         29      2.5 mg         30      2.5 mg          Date Details   06/21 This INR check               How to take your warfarin dose     To take:  2.5 mg Take 1 of the 2.5 mg tablets.           July 2018 Details    Sun Mon Tue Wed Thu Fri Sat     1      2.5 mg          2      2.5 mg         3      2.5 mg         4      2.5 mg         5            6               7                 8               9               10               11               12               13               14                 15               16               17               18               19               20               21                 22               23               24               25               26               27               28                 29               30               31                    Date Details   No additional details    Date of next INR:  7/5/2018         How to take your warfarin dose     To take:  2.5 mg Take 1 of the 2.5 mg tablets.

## 2018-06-21 NOTE — PROGRESS NOTES
SUBJECTIVE:   Antoine Russo is a 72 year old male who presents to clinic today for the following health issues:      Pancreatitis follow up    Is not sure if the pancreatic enzymes are helpful. Has been not taking them consistently, just with large or fatty meals. They are more than $10 each which alarms him but his insurance is paying for it. He recently saw Dr Beasley. Is doing well.     Wt Readings from Last 5 Encounters:   06/21/18 176 lb (79.8 kg)   06/11/18 177 lb 3.2 oz (80.4 kg)   05/22/18 177 lb (80.3 kg)   05/14/18 173 lb (78.5 kg)   05/08/18 171 lb (77.6 kg)      No pain. Has energy and feels good.     Ulceration on buttocks-little worse, sits a lot while on computer. Did put on the triamcinolone cream, is a little worse.     Hypertension-I had stopped the lisinopril. Still on metoprolol  BP Readings from Last 6 Encounters:   06/21/18 139/80   06/11/18 155/70   05/22/18 120/62   05/08/18 98/62   05/07/18 98/53   04/30/18 120/60      Diabetes-on metformin  Lab Results   Component Value Date    A1C 7.7 05/22/2018    A1C 7.7 03/16/2018    A1C 8.1 01/23/2018    A1C 7.0 10/10/2017    A1C 6.4 07/13/2017        Problem list and histories reviewed & adjusted, as indicated.  Additional history: as documented    BP Readings from Last 3 Encounters:   06/21/18 139/80   06/11/18 155/70   05/22/18 120/62    Wt Readings from Last 3 Encounters:   06/21/18 176 lb (79.8 kg)   06/11/18 177 lb 3.2 oz (80.4 kg)   05/22/18 177 lb (80.3 kg)                    Reviewed and updated as needed this visit by clinical staff  Tobacco  Allergies  Meds       Reviewed and updated as needed this visit by Provider         ROS:  CONSTITUTIONAL: NEGATIVE for fever, chills, change in weight  ENT/MOUTH: NEGATIVE for ear, mouth and throat problems  RESP: NEGATIVE for significant cough or SOB  CV: NEGATIVE for chest pain, palpitations or peripheral edema  GI: NEGATIVE for nausea, abdominal pain, heartburn, or change in bowel  habits  : negative for dysuria, hematuria, decreased urinary stream    OBJECTIVE:                                                    /80  Pulse 73  Temp 97  F (36.1  C) (Tympanic)  Resp 16  Wt 176 lb (79.8 kg)  SpO2 97%  BMI 25.25 kg/m2  Body mass index is 25.25 kg/(m^2).  GENERAL: healthy, alert, well nourished, well hydrated, no distress  NECK: no tenderness, no adenopathy, no asymmetry, no masses, no stiffness; thyroid- normal to palpation  RESP: lungs clear to auscultation - no rales, no rhonchi, no wheezes  CV: regular rates and rhythm, normal S1 S2, no S3 or S4 and no murmur, no click or rub -  ABDOMEN: soft, no tenderness, no  hepatosplenomegaly, multiple well healed scars, no masses, normal bowel sounds  MS: extremities- no gross deformities noted, no edema  Skin: right perirectal/buttock has a shallow 4 mm ulceration with some surrounding        ASSESSMENT/PLAN:                                                      ASSESSMENT:  1. Type 2 diabetes mellitus with diabetic polyneuropathy, without long-term current use of insulin (H)    2. Chronic atrial fibrillation (H)    3. Hypertension, benign essential, goal below 140/90    4. 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 Recurrent pancreatitis (H)    6. Long-term (current) use of anticoagulants [Z79.01]    7. Decubitus ulcer of right buttock, stage 2        PLAN:  Orders Placed This Encounter     mupirocin (BACTROBAN) 2 % ointment     See below  Discussed pillow/gel pad/foam cusion  blood pressures trending up, will restart 5 mg of lisinopril    Patient Instructions   Use the antibiotic ointment on the buttocks  Off load pressure    See you in 2 months    Restart the lisinopril at 5 mg daily (half pill)       Katie Gross MD  [facility]

## 2018-06-27 ENCOUNTER — MYC MEDICAL ADVICE (OUTPATIENT)
Dept: FAMILY MEDICINE | Facility: CLINIC | Age: 73
End: 2018-06-27

## 2018-06-27 ENCOUNTER — PATIENT OUTREACH (OUTPATIENT)
Dept: CARE COORDINATION | Facility: CLINIC | Age: 73
End: 2018-06-27

## 2018-06-27 DIAGNOSIS — I10 HYPERTENSION, BENIGN ESSENTIAL, GOAL BELOW 140/90: Primary | Chronic | ICD-10-CM

## 2018-06-27 RX ORDER — LISINOPRIL 5 MG/1
5 TABLET ORAL DAILY
Qty: 90 TABLET | Refills: 0 | Status: SHIPPED | OUTPATIENT
Start: 2018-06-27 | End: 2018-09-16

## 2018-06-27 NOTE — PROGRESS NOTES
Clinic Care Coordination Contact    Clinic Care Coordination Contact  OUTREACH    Referral Information:  Referral Source: ED Follow-Up    Primary Diagnosis: GI Disorders    Chief Complaint   Patient presents with     Clinic Care Coordination - Follow-up     Nurse: f/u         Universal Utilization: There has been no ER or Hospital utilization since we last spoke.  Clinic Utilization  Difficulty keeping appointments:: No  Utilization    Last refreshed: 6/27/2018  8:33 AM:  No Show Count (past year) 1       Last refreshed: 6/27/2018  8:33 AM:  ED visits 2       Last refreshed: 6/27/2018  8:33 AM:  Hospital admissions 5          Current as of: 6/27/2018  8:33 AM             Clinical Concerns:  RN CC spoke with patient, he states he is doing fine. He states he was in to see Dr. Beasley the GI, who wants him to continue taking the CREON. He states he forgets sometimes to take it and eats not so good but denies having any issues. He states its $10/pill and does not know how long he will keep taking it at this time. He was told to f/u with Dr. Beasley as needed. He also saw PCP for diabetes f/u. She restarted patient on Lisinopril 5mg daily for BP trending upwards. He is due to see her in two months. He denies having any questions or concerns regarding his health with RN CC at this time.    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)     Peripheral vascular disease (H)     Colitis      Groin fluid collection     Clostridium difficile enterocolitis     Health Care Home     H/O colectomy     IPMN (intraductal papillary mucinous neoplasm)     Pancreatic duct leak     Type 2 diabetes mellitus with diabetic polyneuropathy, without long-term current use of insulin (H)     Left varicocele     Anticipated difficulty with intubation     Spleen absent     Alcohol-induced acute pancreatitis 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    Education Provided to patient: n/a   Pain  Chronic pain (GOAL):: No  Health Maintenance Reviewed: Due/Overdue   Health Maintenance Due   Topic Date Due     EYE EXAM Q1 YEAR  05/15/2015     TETANUS IMMUNIZATION (SYSTEM ASSIGNED)  11/03/2016     COLONOSCOPY Q5 YR  12/18/2017       Clinical Pathway: None    Medication Management:  Patient is independent in medication management.      Functional Status:  Fallen 2 or more times in the past year?: No  Any fall with injury in the past year?: No    Living Situation:  n/a    Diet/Exercise/Sleep:  Diet:: Low saturated fat  Inadequate nutrition (GOAL):: Yes  Food Insecurity: No  Tube Feeding: No  Exercise:: Currently not exercising  Inadequate activity/exercise (GOAL):: No  Significant changes in sleep pattern (GOAL): No    Transportation:  Transportation concerns (GOAL):: No  Transportation means:: Regular car     Psychosocial:  Financial/Insurance:   No concerns at this time.     Resources and Interventions:  Current Resources: Patient states he has RN CC contact information in the house should he need it for any future needs.        Goals:   Goals        General    Healthy Eating (pt-stated)     Notes - Note edited  6/27/2018 11:42 AM by Coreen Craig RN    Patient states he would like to gain knowledge and advice on  proper diet recommendations for his given diagnosis; he is meeting with the dietician tomorrow to review this in detail.  RN CC will be available in the future if needed.     4/26/18: Patient saw the dietician and is trying to figure out what will work for him regarding his diet and increasing protein.   5/25/18: Starting to gain weight, getting energy.  6/27/18: Weight is staying stable at 176-177 lbs, He states he eats a treat one time daily otherwise eats pretty healthy.                Patient/Caregiver understanding: n/a    Outreach Frequency: monthly  Future Appointments              In 1 week NB ANTI COAG [facility]    In 1 month Katie Gross MD [facility]          Plan:   No further Care Coordination needs identified at this time. Patient may be referred to Care Coordination in the future if additional needs arise.  Pt encouraged to contact Care Coordinator through the clinic if situation changes and assistance is needed. No follow-up planned.    Coreen Craig RN, [facility]  RN Care Coordinator  [facility]  Phone # [phone]  6/27/2018 11:56 AM

## 2018-07-05 ENCOUNTER — ANTICOAGULATION THERAPY VISIT (OUTPATIENT)
Dept: ANTICOAGULATION | Facility: CLINIC | Age: 73
End: 2018-07-05
Payer: COMMERCIAL

## 2018-07-05 DIAGNOSIS — Z79.01 LONG-TERM (CURRENT) USE OF ANTICOAGULANTS: ICD-10-CM

## 2018-07-05 DIAGNOSIS — I48.20 CHRONIC ATRIAL FIBRILLATION (H): ICD-10-CM

## 2018-07-05 LAB — INR POINT OF CARE: 2.6 (ref 0.86–1.14)

## 2018-07-05 PROCEDURE — 85610 PROTHROMBIN TIME: CPT | Mod: QW

## 2018-07-05 PROCEDURE — 99207 ZZC NO CHARGE NURSE ONLY: CPT

## 2018-07-05 PROCEDURE — 36416 COLLJ CAPILLARY BLOOD SPEC: CPT

## 2018-07-05 NOTE — MR AVS SNAPSHOT
Antoine Russo   7/5/2018 9:30 AM   Anticoagulation Therapy Visit    Description:  72 year old male   Provider:  NB ANTI COAG   Department:  Nb Anticoag           INR as of 7/5/2018     Today's INR 2.6      Anticoagulation Summary as of 7/5/2018     INR goal 2.0-3.0   Today's INR 2.6   Full warfarin instructions 2.5 mg every day   Next INR check 7/26/2018    Indications   Chronic atrial fibrillation (H) [I48.2]  Long-term (current) use of anticoagulants [Z79.01] [Z79.01]         Your next Anticoagulation Clinic appointment(s)     Jul 26, 2018 10:45 AM CDT   [facility]    [address] [place] [address] [phone]              Contact Numbers     Please call [phone] with any problems or questions regarding your therapy.    If you need to cancel and/or reschedule your appointment please call one of the following numbers:  [facility] [phone]  [facility] [phone]  [facility] [phone]  [facility] [phone]  [place] - [phone]            July 2018 Details    Sun Mon Tue Wed Thu Fri Sat     1               2               3               4               5      2.5 mg   See details      6      2.5 mg         7      2.5 mg           8      2.5 mg         9      2.5 mg         10      2.5 mg         11      2.5 mg         12      2.5 mg         13      2.5 mg         14      2.5 mg           15      2.5 mg         16      2.5 mg         17      2.5 mg         18      2.5 mg         19      2.5 mg         20      2.5 mg         21      2.5 mg           22      2.5 mg         23      2.5 mg         24      2.5 mg         25      2.5 mg         26            27               28                 29               30               31                    Date Details   07/05 This INR check       Date of next INR:  7/26/2018         How to take your warfarin dose     To take:  2.5 mg  Take 1 of the 2.5 mg tablets.

## 2018-07-05 NOTE — PROGRESS NOTES
ANTICOAGULATION FOLLOW-UP CLINIC VISIT    Patient Name:  Antoine Russo  Date:  7/5/2018  Contact Type:  Face to Face    SUBJECTIVE:     Patient Findings     Positives No Problem Findings    Comments No changes or concerns. Per patient report, he only takes pancreatic enzymes if he eats a large or fatty meal. Pt will see gastro PRN now. Will keep at recently increased dosing of 17.5 mg/week.            OBJECTIVE    INR Protime   Date Value Ref Range Status   07/05/2018 2.6 (A) 0.86 - 1.14 Final       ASSESSMENT / PLAN  INR assessment THER    Recheck INR In: 3 WEEKS    INR Location Clinic      Anticoagulation Summary as of 7/5/2018     INR goal 2.0-3.0   Today's INR 2.6   Warfarin maintenance plan 2.5 mg (2.5 mg x 1) every day   Full warfarin instructions 2.5 mg every day   Weekly warfarin total 17.5 mg   No change documented Aparna Kunz, RN   Plan last modified Angela Harrell RN (6/21/2018)   Next INR check 7/26/2018   Priority INR   Target end date Indefinite    Indications   Chronic atrial fibrillation (H) [I48.2]  Long-term (current) use of anticoagulants [Z79.01] [Z79.01]         Anticoagulation Episode Summary     INR check location     Preferred lab     Send INR reminders to NB ANTICO CLINIC POOL    Comments * 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

## 2018-07-26 ENCOUNTER — ANTICOAGULATION THERAPY VISIT (OUTPATIENT)
Dept: ANTICOAGULATION | Facility: CLINIC | Age: 73
End: 2018-07-26
Payer: COMMERCIAL

## 2018-07-26 DIAGNOSIS — I48.20 CHRONIC ATRIAL FIBRILLATION (H): ICD-10-CM

## 2018-07-26 DIAGNOSIS — Z79.01 LONG-TERM (CURRENT) USE OF ANTICOAGULANTS: ICD-10-CM

## 2018-07-26 LAB — INR POINT OF CARE: 2.3 (ref 0.86–1.14)

## 2018-07-26 PROCEDURE — 99207 ZZC NO CHARGE NURSE ONLY: CPT

## 2018-07-26 PROCEDURE — 36416 COLLJ CAPILLARY BLOOD SPEC: CPT

## 2018-07-26 PROCEDURE — 85610 PROTHROMBIN TIME: CPT | Mod: QW

## 2018-07-26 NOTE — MR AVS SNAPSHOT
Antoine Russo   7/26/2018 10:45 AM   Anticoagulation Therapy Visit    Description:  72 year old male   Provider:  NB ANTI COAG   Department:  [facility] Antico[facility]           INR as of 7/26/2018     Today's INR 2.3      Anticoagulation Summary as of 7/26/2018     INR goal 2.0-3.0   Today's INR 2.3   Full warfarin instructions 2.5 mg every day   Next INR check 8/23/2018    Indications   Chronic atrial fibrillation (H) [I48.2]  Long-term (current) use of anticoagulants [Z79.01] [Z79.01]         Your next Anticoagulation Clinic appointment(s)     Aug 23, 2018  9:45 AM CDT   Anticoagulation Visit with NB ANTI COAG   [facility] ([facility])    [address] [place] [id number] [phone]              Contact Numbers     Please call [phone] with any problems or questions regarding your therapy.    If you need to cancel and/or reschedule your appointment please call one of the following numbers:  [facility] [phone]  [facility] [phone]  [facility] [phone]  [facility] [phone]  [place] - [phone]            July 2018 Details    Sun Mon Tue Wed Thu Fri Sat     1               2               3               4               5               6               7                 8               9               10               11               12               13               14                 15               16               17               18               19               20               21                 22               23               24               25               26      2.5 mg   See details      27      2.5 mg         28      2.5 mg           29      2.5 mg         30      2.5 mg         31      2.5 mg              Date Details   07/26 This INR check               How to take your warfarin dose     To take:  2.5 mg Take 1 of the 2.5 mg tablets.           August 2018 Details    Sun Mon Tue Wed Thu Fri Sat        1      2.5 mg          2      2.5 mg         3      2.5 mg         4      2.5 mg           5      2.5 mg         6      2.5 mg         7      2.5 mg         8      2.5 mg         9      2.5 mg         10      2.5 mg         11      2.5 mg           12      2.5 mg         13      2.5 mg         14      2.5 mg         15      2.5 mg         16      2.5 mg         17      2.5 mg         18      2.5 mg           19      2.5 mg         20      2.5 mg         21      2.5 mg         22      2.5 mg         23            24               25                 26               27               28               29               30               31                 Date Details   No additional details    Date of next INR:  8/23/2018         How to take your warfarin dose     To take:  2.5 mg Take 1 of the 2.5 mg tablets.

## 2018-07-26 NOTE — PROGRESS NOTES
ANTICOAGULATION FOLLOW-UP CLINIC VISIT    Patient Name:  Antoine Russo  Date:  7/26/2018  Contact Type:  Face to Face    SUBJECTIVE:     Patient Findings     Positives Change in diet/appetite (had 3 beers on Saturday and yesterday)    Comments The patient is slowly introducing alcohol back into his diet after recent pancreatic issues. He had 1-3 beers daily while he was camping recently. Writer did remind the patient to try to limit his alcohol to 1-2 daily. The patient does spread out his drinks throughout the day, rather than drinking them all at once. No other changes. Still eating well. No signs of bleeding or clots.           OBJECTIVE    INR Protime   Date Value Ref Range Status   07/26/2018 2.3 (A) 0.86 - 1.14 Final       ASSESSMENT / PLAN  INR assessment THER    Recheck INR In: 4 WEEKS    INR Location Clinic      Anticoagulation Summary as of 7/26/2018     INR goal 2.0-3.0   Today's INR 2.3   Warfarin maintenance plan 2.5 mg (2.5 mg x 1) every day   Full warfarin instructions 2.5 mg every day   Weekly warfarin total 17.5 mg   No change documented Aparna Kunz RN   Plan last modified Angela Harrell RN (6/21/2018)   Next INR check 8/23/2018   Priority INR   Target end date Indefinite    Indications   Chronic atrial fibrillation (H) [I48.2]  Long-term (current) use of anticoagulants [Z79.01] [Z79.01]         Anticoagulation Episode Summary     INR check location     Preferred lab     Send INR reminders to [facility] [place]    Comments * 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

## 2018-07-29 DIAGNOSIS — I48.20 CHRONIC ATRIAL FIBRILLATION (H): ICD-10-CM

## 2018-07-30 RX ORDER — WARFARIN SODIUM 2.5 MG/1
TABLET ORAL
Qty: 90 TABLET | Refills: 0 | Status: SHIPPED | OUTPATIENT
Start: 2018-07-30 | End: 2018-08-23

## 2018-08-23 ENCOUNTER — ANTICOAGULATION THERAPY VISIT (OUTPATIENT)
Dept: ANTICOAGULATION | Facility: CLINIC | Age: 73
End: 2018-08-23
Payer: COMMERCIAL

## 2018-08-23 ENCOUNTER — OFFICE VISIT (OUTPATIENT)
Dept: FAMILY MEDICINE | Facility: CLINIC | Age: 73
End: 2018-08-23
Payer: COMMERCIAL

## 2018-08-23 VITALS
BODY MASS INDEX: 26.77 KG/M2 | HEART RATE: 69 BPM | HEIGHT: 70 IN | DIASTOLIC BLOOD PRESSURE: 64 MMHG | RESPIRATION RATE: 16 BRPM | WEIGHT: 187 LBS | SYSTOLIC BLOOD PRESSURE: 120 MMHG | OXYGEN SATURATION: 96 % | TEMPERATURE: 97.1 F

## 2018-08-23 DIAGNOSIS — I48.20 CHRONIC ATRIAL FIBRILLATION (H): Chronic | ICD-10-CM

## 2018-08-23 DIAGNOSIS — Z79.01 LONG-TERM (CURRENT) USE OF ANTICOAGULANTS: ICD-10-CM

## 2018-08-23 DIAGNOSIS — E11.42 TYPE 2 DIABETES MELLITUS WITH DIABETIC POLYNEUROPATHY, WITHOUT LONG-TERM CURRENT USE OF INSULIN (H): Primary | ICD-10-CM

## 2018-08-23 DIAGNOSIS — E78.5 HYPERLIPIDEMIA LDL GOAL <100: Chronic | ICD-10-CM

## 2018-08-23 DIAGNOSIS — I48.20 CHRONIC ATRIAL FIBRILLATION (H): ICD-10-CM

## 2018-08-23 DIAGNOSIS — N40.0 HYPERTROPHY OF PROSTATE WITHOUT URINARY OBSTRUCTION: Chronic | ICD-10-CM

## 2018-08-23 DIAGNOSIS — I10 HYPERTENSION, BENIGN ESSENTIAL, GOAL BELOW 140/90: Chronic | ICD-10-CM

## 2018-08-23 LAB
HBA1C MFR BLD: 7.1 % (ref 0–5.6)
INR POINT OF CARE: 2.6 (ref 0.86–1.14)

## 2018-08-23 PROCEDURE — 99207 ZZC NO CHARGE NURSE ONLY: CPT

## 2018-08-23 PROCEDURE — 99214 OFFICE O/P EST MOD 30 MIN: CPT | Performed by: FAMILY MEDICINE

## 2018-08-23 PROCEDURE — 85610 PROTHROMBIN TIME: CPT | Mod: QW

## 2018-08-23 PROCEDURE — 83036 HEMOGLOBIN GLYCOSYLATED A1C: CPT | Performed by: FAMILY MEDICINE

## 2018-08-23 PROCEDURE — 36416 COLLJ CAPILLARY BLOOD SPEC: CPT

## 2018-08-23 RX ORDER — GABAPENTIN 300 MG/1
CAPSULE ORAL
Qty: 180 CAPSULE | Refills: 1 | Status: SHIPPED | OUTPATIENT
Start: 2018-08-23 | End: 2018-09-10

## 2018-08-23 RX ORDER — WARFARIN SODIUM 2.5 MG/1
2.5 TABLET ORAL DAILY
Qty: 90 TABLET | Refills: 0 | COMMUNITY
Start: 2018-08-23 | End: 2018-11-23

## 2018-08-23 NOTE — PROGRESS NOTES
ANTICOAGULATION FOLLOW-UP CLINIC VISIT    Patient Name:  Antoine Russo  Date:  8/23/2018  Contact Type:  Face to Face    SUBJECTIVE:     Patient Findings     Positives No Problem Findings    Comments No changes in diet, activity level, medications (including over the counter), or health. No missed doses of warfarin. Patient took dosing as prescribed. No signs of clots or bleeding concerns. Patient will continue maintenance warfarin dosing.    Patient is seeing PCP today about his neuropathy and BP.           OBJECTIVE    INR Protime   Date Value Ref Range Status   08/23/2018 2.6 (A) 0.86 - 1.14 Final       ASSESSMENT / PLAN  INR assessment THER    Recheck INR In: 6 WEEKS    INR Location Clinic      Anticoagulation Summary as of 8/23/2018     INR goal 2.0-3.0   Today's INR 2.6   Warfarin maintenance plan 2.5 mg (2.5 mg x 1) every day   Full warfarin instructions 2.5 mg every day   Weekly warfarin total 17.5 mg   No change documented Aparna Kunz RN   Plan last modified Angela Harrell RN (6/21/2018)   Next INR check 10/4/2018   Priority INR   Target end date Indefinite    Indications   Chronic atrial fibrillation (H) [I48.2]  Long-term (current) use of anticoagulants [Z79.01] [Z79.01]         Anticoagulation Episode Summary     INR check location     Preferred lab     Send INR reminders to [facility]    Comments * 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

## 2018-08-23 NOTE — PATIENT INSTRUCTIONS
Increase the gabapentin to one in am, 2 at night.    Good job on the A1C  Lab Results   Component Value Date    A1C 7.1 08/23/2018    A1C 7.7 05/22/2018    A1C 7.7 03/16/2018    A1C 8.1 01/23/2018    A1C 7.0 10/10/2017        See you in 3 months

## 2018-08-23 NOTE — MR AVS SNAPSHOT
Antoine Russo   8/23/2018 9:45 AM   Anticoagulation Therapy Visit    Description:  72 year old male   Provider:  NB ANTI COAG   Department:  [facility] Anticoag           INR as of 8/23/2018     Today's INR 2.6      Anticoagulation Summary as of 8/23/2018     INR goal 2.0-3.0   Today's INR 2.6   Full warfarin instructions 2.5 mg every day   Next INR check 10/4/2018    Indications   Chronic atrial fibrillation (H) [I48.2]  Long-term (current) use of anticoagulants [Z79.01] [Z79.01]         Your next Anticoagulation Clinic appointment(s)     Oct 04, 2018 10:00 AM CDT   Anticoagulation Visit with NB ANTI COAG   [facility] ([facility])    [address] [place] [address] [phone]              Contact Numbers     Please call [phone] with any problems or questions regarding your therapy.    If you need to cancel and/or reschedule your appointment please call one of the following numbers:  [facility] [phone]  [facility] [phone]  [facility] [phone]  [facility] [phone]  [place] - [phone]            August 2018 Details    Sun Mon Tue Wed Thu Fri Sat        1               2               3               4                 5               6               7               8               9               10               11                 12               13               14               15               16               17               18                 19               20               21               22               23      2.5 mg   See details      24      2.5 mg         25      2.5 mg           26      2.5 mg         27      2.5 mg         28      2.5 mg         29      2.5 mg         30      2.5 mg         31      2.5 mg           Date Details   08/23 This INR check               How to take your warfarin dose     To take:  2.5 mg Take 1 of the 2.5 mg tablets.           September 2018 Details    Sun Mon Tue Wed Thu Fri Sat            1      2.5 mg           2      2.5 mg         3      2.5 mg         4      2.5 mg         5      2.5 mg         6      2.5 mg         7      2.5 mg         8      2.5 mg           9      2.5 mg         10      2.5 mg         11      2.5 mg         12      2.5 mg         13      2.5 mg         14      2.5 mg         15      2.5 mg           16      2.5 mg         17      2.5 mg         18      2.5 mg         19      2.5 mg         20      2.5 mg         21      2.5 mg         22      2.5 mg           23      2.5 mg         24      2.5 mg         25      2.5 mg         26      2.5 mg         27      2.5 mg         28      2.5 mg         29      2.5 mg           30      2.5 mg                Date Details   No additional details            How to take your warfarin dose     To take:  2.5 mg Take 1 of the 2.5 mg tablets.           October 2018 Details    Sun Mon Tue Wed Thu Fri Sat      1      2.5 mg         2      2.5 mg         3      2.5 mg         4            5               6                 7               8               9               10               11               12               13                 14               15               16               17               18               19               20                 21               22               23               24               25               26               27                 28               29               30               31                   Date Details   No additional details    Date of next INR:  10/4/2018         How to take your warfarin dose     To take:  2.5 mg Take 1 of the 2.5 mg tablets.

## 2018-08-23 NOTE — NURSING NOTE
"Chief Complaint   Patient presents with     Hypertension     Follow up     NEUROPATHY     Having more nerve pain       Initial /64 (BP Location: Right arm, Patient Position: Sitting)  Pulse 69  Temp 97.1  F (36.2  C) (Tympanic)  Resp 16  Ht 5' 10\" (1.778 m)  Wt 187 lb (84.8 kg)  SpO2 96%  BMI 26.83 kg/m2 Estimated body mass index is 26.83 kg/(m^2) as calculated from the following:    Height as of this encounter: 5' 10\" (1.778 m).    Weight as of this encounter: 187 lb (84.8 kg).      Health Maintenance that is potentially due pending provider review:  Colonoscopy/FIT    Pt declines to have colonoscopy.    Is there anyone who you would like to be able to receive your results? No  If yes have patient fill out VIDYA      "

## 2018-08-23 NOTE — PROGRESS NOTES
"  SUBJECTIVE:   Antoine Russo is a 72 year old male who presents to clinic today for the following health issues:      Hypertension Follow-up      Outpatient blood pressures are not being checked.    Low Salt Diet: low salt      Amount of exercise or physical activity: active    Problems taking medications regularly: No,     Medication side effects: none    Diet: regular (no restrictions)  On lisinopril, metoprolol. Flomax for BPH  No chest pain or shortness of breath     Neuropathy      Duration: 3 Months    Description (location/character/radiation): feet    Intensity:  moderate    Accompanying signs and symptoms: zings at times    History (similar episodes/previous evaluation): None    Precipitating or alleviating factors: None    Therapies tried and outcome: saw podiatry in past     Progressively worsening. More\"zingers\".   Gabapentin has helped.     Diabetes  Lab Results   Component Value Date    A1C 7.7 05/22/2018    A1C 7.7 03/16/2018    A1C 8.1 01/23/2018    A1C 7.0 10/10/2017    A1C 6.4 07/13/2017   On metformin     History of pancreatitis  No pain. On Creon occasionally \"when I remember\"    Afib: on metoprolol for rate control, on warfarin  Just saw [facility]    Recent Labs   Lab Test  03/16/18   0848  07/13/17   0828   04/14/15   0853  05/15/14   0832   CHOL  143  170   < >  144  165   HDL  30*  30*   < >  27*  30*   LDL  69  100*   < >  76  75   TRIG  221*  200*   < >  206*  302*   CHOLHDLRATIO   --    --    --   5.3*  5.0    < > = values in this interval not displayed.        Problem list and histories reviewed & adjusted, as indicated.  Additional history: as documented    Recent Labs   Lab Test  08/23/18   1022  05/22/18   0938  04/24/18   0940   04/20/18   0145   03/16/18   0848   07/13/17   0828   01/03/17   0837   07/15/16   0904   A1C  7.1*  7.7*   --    --    --    --   7.7*   < >  6.4*   < >  8.0*   --   6.6*   LDL   --    --    --    --    --    --   69   --   100*   --    --    --   " "110*   HDL   --    --    --    --    --    --   30*   --   30*   --    --    --   43   TRIG   --    --    --    --    --    --   221*   --   200*   --    --    --   134   ALT   --   11  11   --   10   < >  30   < >   --    --   22   --   24   CR   --   0.88  0.92   < >  0.92   < >  1.11   < >   --    < >  0.76   --   0.64*   GFRESTIMATED   --   85  81   < >  82   < >  65   < >   --    < >  >90  Non  GFR Calc     < >  >90  Non  GFR Calc     GFRESTBLACK   --   >90  >90   < >  >90   < >  79   < >   --    < >  >90   GFR Calc     < >  >90   GFR Calc     POTASSIUM   --   4.4  4.8   < >  4.3   < >  4.7   < >   --    < >  4.0   --   4.4   TSH   --    --   1.12   --    --    --    --    --    --    --   1.37   --    --     < > = values in this interval not displayed.      BP Readings from Last 3 Encounters:   08/23/18 120/64   06/21/18 139/80   06/11/18 155/70    Wt Readings from Last 3 Encounters:   08/23/18 187 lb (84.8 kg)   06/21/18 176 lb (79.8 kg)   06/11/18 177 lb 3.2 oz (80.4 kg)             Reviewed and updated as needed this visit by clinical staff  Tobacco  Allergies  Meds  Problems  Med Hx  Surg Hx  Fam Hx  Soc Hx        Reviewed and updated as needed this visit by Provider  Allergies  Meds  Problems         ROS:  CONSTITUTIONAL: NEGATIVE for fever, chills, change in weight  ENT/MOUTH: NEGATIVE for ear, mouth and throat problems  RESP: NEGATIVE for significant cough or SOB  CV: NEGATIVE for chest pain, palpitations or peripheral edema  GI: NEGATIVE for nausea, abdominal pain, heartburn, or change in bowel habits  : negative for dysuria, hematuria, decreased urinary stream    OBJECTIVE:                                                    /64 (BP Location: Right arm, Patient Position: Sitting)  Pulse 69  Temp 97.1  F (36.2  C) (Tympanic)  Resp 16  Ht 5' 10\" (1.778 m)  Wt 187 lb (84.8 kg)  SpO2 96%  BMI 26.83 kg/m2  Body mass " index is 26.83 kg/(m^2).  GENERAL: healthy, alert, well nourished, well hydrated, no distress  HENT: ear canals- normal; TMs- normal; Nose- normal; Mouth- no ulcers, no lesions  NECK: no tenderness, no adenopathy, no asymmetry, no masses, no stiffness; thyroid- normal to palpation  RESP: lungs clear to auscultation - no rales, no rhonchi, no wheezes  CV: regular rates and rhythm, normal S1 S2, no S3 or S4 and no murmur, no click or rub -  ABDOMEN: soft, no tenderness, no  hepatosplenomegaly, no masses, normal bowel sounds  MS: extremities- no gross deformities noted, no edema  Diabetic foot exam: normal DP and PT pulses, no trophic changes or ulcerative lesions and reduced sensation at toes, monofilament exam shows no feeling in toes or plantar surface    Diagnostic test results:  Diagnostic Test Results:  Results for orders placed or performed in visit on 08/23/18   Hemoglobin A1c   Result Value Ref Range    Hemoglobin A1C 7.1 (H) 0 - 5.6 %        ASSESSMENT/PLAN:                                                      ASSESSMENT:  1. Type 2 diabetes mellitus with diabetic polyneuropathy, without long-term current use of insulin (H)    2. Hypertension, benign essential, goal below 140/90    3. Chronic atrial fibrillation (H)    4. Hyperlipidemia LDL goal <100    5. Hypertrophy of prostate without urinary obstruction    6. Recurrent pancreatitis (H)        PLAN:  Orders Placed This Encounter     Hemoglobin A1c     NEUROLOGY ADULT REFERRAL     gabapentin (NEURONTIN) 300 MG capsule     As below  Recommend seeing neurology, consider starting amitriptyline      Patient Instructions     Increase the gabapentin to one in am, 2 at night.    Good job on the A1C  Lab Results   Component Value Date    A1C 7.1 08/23/2018    A1C 7.7 05/22/2018    A1C 7.7 03/16/2018    A1C 8.1 01/23/2018    A1C 7.0 10/10/2017        See you in 3 months     Katie Gross MD  [facility]

## 2018-08-23 NOTE — MR AVS SNAPSHOT
After Visit Summary   8/23/2018    Antoine Russo    MRN: [id number]           Patient Information     Date Of Birth          1945        Visit Information        Provider Department      8/23/2018 9:00 AM Katie Gorss MD [facility]        Today's Diagnoses     Type 2 diabetes mellitus with diabetic polyneuropathy, without long-term current use of insulin (H)    -  1    Hypertension, benign essential, goal below 140/90        Chronic atrial fibrillation (H)        Hyperlipidemia LDL goal <100          Care Instructions    Increase the gabapentin to one in am, 2 at night.    Good job on the A1C  Lab Results   Component Value Date    A1C 7.1 08/23/2018    A1C 7.7 05/22/2018    A1C 7.7 03/16/2018    A1C 8.1 01/23/2018    A1C 7.0 10/10/2017        See you in 3 months          Follow-ups after your visit        Additional Services     NEUROLOGY ADULT REFERRAL       Your provider has referred you for the following:   Consult at [facility]: [facility] [phone]   [email or web address]    Please be aware that coverage of these services is subject to the terms and limitations of your health insurance plan.  Call member services at your health plan with any benefit or coverage questions.      Please bring the following with you to your appointment:    (1) Any X-Rays, CTs or MRIs which have been performed.  Contact the facility where they were done to arrange for  prior to your scheduled appointment.    (2) List of current medications  (3) This referral request   (4) Any documents/labs given to you for this referral                  Follow-up notes from your care team     Return in about 3 months (around 11/23/2018) for diabetes.      Your next 10 appointments already scheduled     Oct 04, 2018 10:00 AM CDT   Anticoagulation Visit with NB ANTI COAG   [facility] ([facility]    [id number]  "[facility] [address]   [phone]              Who to contact     If you have questions or need follow up information about today's clinic visit or your schedule please contact [facility] directly at [phone].  Normal or non-critical lab and imaging results will be communicated to you by MyChart, letter or phone within 4 business days after the clinic has received the results. If you do not hear from us within 7 days, please contact the clinic through [facility] or phone. If you have a critical or abnormal lab result, we will notify you by phone as soon as possible.  Submit refill requests through [facility] or call your pharmacy and they will forward the refill request to us. Please allow 3 business days for your refill to be completed.          Additional Information About Your Visit        [facility] Information     [facility] gives you secure access to your electronic health record. If you see a primary care provider, you can also send messages to your care team and make appointments. If you have questions, please call your primary care clinic.  If you do not have a primary care provider, please call [phone] and they will assist you.        Care EveryWhere ID     This is your Care EveryWhere ID. This could be used by other organizations to access your [facility] medical records  [id number]        Your Vitals Were     Pulse Temperature Respirations Height Pulse Oximetry BMI (Body Mass Index)    69 97.1  F (36.2  C) (Tympanic) 16 5' 10\" (1.778 m) 96% 26.83 kg/m2       Blood Pressure from Last 3 Encounters:   08/23/18 120/64   06/21/18 139/80   06/11/18 155/70    Weight from Last 3 Encounters:   08/23/18 187 lb (84.8 kg)   06/21/18 176 lb (79.8 kg)   06/11/18 177 lb 3.2 oz (80.4 kg)              We Performed the Following     Hemoglobin A1c     NEUROLOGY ADULT REFERRAL          Today's Medication Changes          These changes are accurate as of 8/23/18 10:41 AM.  If you have any " questions, ask your nurse or doctor.               These medicines have changed or have updated prescriptions.        Dose/Directions    gabapentin 300 MG capsule   Commonly known as:  NEURONTIN   This may have changed:  additional instructions   Used for:  Type 2 diabetes mellitus with diabetic polyneuropathy, without long-term current use of insulin (H)   Changed by:  Katie Gross MD        Take one in am, and 2 tablest (600 mg) every night   Quantity:  180 capsule   Refills:  1       warfarin 2.5 MG tablet   Commonly known as:  COUMADIN   This may have changed:  See the new instructions.   Used for:  Chronic atrial fibrillation (H)        2.5 mg daily or as directed by Anticoagulation Clinic   Quantity:  90 tablet   Refills:  0         Stop taking these medicines if you haven't already. Please contact your care team if you have questions.     triamcinolone 0.1 % cream   Commonly known as:  KENALOG   Stopped by:  Katie Gross MD                Where to get your medicines      These medications were sent to [facility] [address] [place] [address]  [address] [place] [address]     Phone:  [phone]     gabapentin 300 MG capsule                Primary Care Provider Office Phone # Fax #    Katie Gross [phone]       [address] [place] [address]        Equal Access to Services     ABBEY WALTON AH: Hadii stoney matthew hadasho Soomaali, waaxda luqadaha, qaybta kaalmada adeegyada, koffi fay. So [facility] [phone].    ATENCIÓN: Si habla español, tiene a snyder disposición servicios gratuitos de asistencia lingüística. Llame al [phone].    We comply with applicable federal civil rights laws and [place] laws. We do not discriminate on the basis of race, color, national origin, age, disability, sex, sexual orientation, or gender identity.            Thank you!     Thank you for choosing [facility]  for your  care. Our goal is always to provide you with excellent care. Hearing back from our patients is one way we can continue to improve our services. Please take a few minutes to complete the written survey that you may receive in the mail after your visit with us. Thank you!             Your Updated Medication List - Protect others around you: Learn how to safely use, store and throw away your medicines at www.disposemymeds.org.          This list is accurate as of 8/23/18 10:41 AM.  Always use your most recent med list.                   Brand Name Dispense Instructions for use Diagnosis    amylase-lipase-protease 30080 units Cpep    CREON    360 capsule    Take 1-3 with snacks/meals, up to 12 per day.    Acute pancreatitis with uninfected necrosis, unspecified pancreatitis type       ASPIRIN NOT PRESCRIBED    INTENTIONAL     Antiplatelet medication not prescribed intentionally due to Current anticoagulant therapy (warfarin/enoxaparin)        gabapentin 300 MG capsule    NEURONTIN    180 capsule    Take one in am, and 2 tablest (600 mg) every night    Type 2 diabetes mellitus with diabetic polyneuropathy, without long-term current use of insulin (H)       lisinopril 5 MG tablet    PRINIVIL/ZESTRIL    90 tablet    Take 1 tablet (5 mg) by mouth daily    Hypertension, benign essential, goal below 140/90       metFORMIN 500 MG 24 hr tablet    GLUCOPHAGE-XR    360 tablet    Take 2 tablets (1,000 mg) by mouth 2 times daily (with meals)    Type 2 diabetes mellitus with diabetic polyneuropathy, without long-term current use of insulin (H)       metoprolol succinate 50 MG 24 hr tablet    TOPROL-XL    270 tablet    Take 1.5 tablets (75 mg) by mouth 2 times daily    Hypertension, benign essential, goal below 140/90       multivitamin, therapeutic with minerals Tabs tablet     30 each    Take 1 tablet by mouth daily.    Surgery aftercare       tamsulosin 0.4 MG capsule    FLOMAX    90 capsule    Take 1 capsule (0.4 mg) by mouth  daily    Urgency of urination, Hypertrophy of prostate without urinary obstruction       warfarin 2.5 MG tablet    COUMADIN    90 tablet    2.5 mg daily or as directed by Anticoagulation Clinic    Chronic atrial fibrillation (H)

## 2018-09-07 ENCOUNTER — MYC MEDICAL ADVICE (OUTPATIENT)
Dept: FAMILY MEDICINE | Facility: CLINIC | Age: 73
End: 2018-09-07

## 2018-09-07 DIAGNOSIS — E11.42 TYPE 2 DIABETES MELLITUS WITH DIABETIC POLYNEUROPATHY, WITHOUT LONG-TERM CURRENT USE OF INSULIN (H): ICD-10-CM

## 2018-09-10 RX ORDER — GABAPENTIN 300 MG/1
CAPSULE ORAL
Qty: 270 CAPSULE | Refills: 3 | Status: SHIPPED | OUTPATIENT
Start: 2018-09-10 | End: 2018-11-12

## 2018-09-16 DIAGNOSIS — E11.42 TYPE 2 DIABETES MELLITUS WITH DIABETIC POLYNEUROPATHY, WITHOUT LONG-TERM CURRENT USE OF INSULIN (H): ICD-10-CM

## 2018-09-16 DIAGNOSIS — I10 HYPERTENSION, BENIGN ESSENTIAL, GOAL BELOW 140/90: Chronic | ICD-10-CM

## 2018-09-16 NOTE — TELEPHONE ENCOUNTER
"Requested Prescriptions   Pending Prescriptions Disp Refills     metFORMIN (GLUCOPHAGE-XR) 500 MG 24 hr tablet   Last Written Prescription Date:  1/30/18  Last Fill Quantity: 360,  # refills: 1   Last office visit: 8/23/2018 with prescribing provider:  REI Gross      360 tablet 1     Sig: TAKE TWO TABLETS BY MOUTH TWICE DAILY WITH MEALS    Biguanide Agents Passed    9/16/2018  8:18 AM       Passed - Blood pressure less than 140/90 in past 6 months    BP Readings from Last 3 Encounters:   08/23/18 120/64   06/21/18 139/80   06/11/18 155/70                Passed - Patient has documented LDL within the past 12 mos.    Recent Labs   Lab Test  03/16/18   0848   LDL  69            Passed - Patient has had a Microalbumin in the past 15 mos.    Recent Labs   Lab Test  10/10/17   0918   MICROL  187   UMALCR  125.50*            Passed - Patient is age 10 or older       Passed - Patient has documented A1c within the specified period of time.    If HgbA1C is 8 or greater, it needs to be on file within the past 3 months.  If less than 8, must be on file within the past 6 months.     Recent Labs   Lab Test  08/23/18   1022   A1C  7.1*            Passed - Patient's CR is NOT>1.4 OR Patient's EGFR is NOT<45 within past 12 mos.    Recent Labs   Lab Test  05/22/18   0938   GFRESTIMATED  85   GFRESTBLACK  >90       Recent Labs   Lab Test  05/22/18   0938   CR  0.88            Passed - Patient does NOT have a diagnosis of CHF.       Passed - Recent (6 mo) or future (30 days) visit within the authorizing provider's specialty    Patient had office visit in the last 6 months or has a visit in the next 30 days with authorizing provider or within the authorizing provider's specialty.  See \"Patient Info\" tab in inbasket, or \"Choose Columns\" in Meds & Orders section of the refill encounter.            lisinopril (PRINIVIL/ZESTRIL) 5 MG tablet  Last Written Prescription Date:  6/27/18  Last Fill Quantity: 90,  # refills: 0   Last office " "visit: 8/23/2018 with prescribing provider:  REI Gross   Future Office Visit:   Next 5 appointments (look out 90 days)     Nov 27, 2018  9:00 AM CST   Office Visit with Katie Gross MD   [facility] ([facility]    [address] [place] [address]   [phone]                  90 tablet 0     Sig: TAKE 1 TABLET BY MOUTH ONCE DAILY    ACE Inhibitors (Including Combos) Protocol Passed    9/16/2018  8:18 AM       Passed - Blood pressure under 140/90 in past 12 months    BP Readings from Last 3 Encounters:   08/23/18 120/64   06/21/18 139/80   06/11/18 155/70                Passed - Recent (12 mo) or future (30 days) visit within the authorizing provider's specialty    Patient had office visit in the last 12 months or has a visit in the next 30 days with authorizing provider or within the authorizing provider's specialty.  See \"Patient Info\" tab in inbasket, or \"Choose Columns\" in Meds & Orders section of the refill encounter.           Passed - Patient is age 18 or older       Passed - Normal serum creatinine on file in past 12 months    Recent Labs   Lab Test  05/22/18   0938   07/31/17   0808   CR  0.88   < >   --    CREAT   --    --   1.1    < > = values in this interval not displayed.            Passed - Normal serum potassium on file in past 12 months    Recent Labs   Lab Test  05/22/18   0938   POTASSIUM  4.4               "

## 2018-09-17 RX ORDER — METFORMIN HCL 500 MG
TABLET, EXTENDED RELEASE 24 HR ORAL
Qty: 360 TABLET | Refills: 0 | Status: SHIPPED | OUTPATIENT
Start: 2018-09-17 | End: 2019-02-01

## 2018-09-17 RX ORDER — LISINOPRIL 5 MG/1
TABLET ORAL
Qty: 90 TABLET | Refills: 1 | Status: SHIPPED | OUTPATIENT
Start: 2018-09-17 | End: 2019-03-27

## 2018-09-17 NOTE — TELEPHONE ENCOUNTER
Prescription for metformin approved per [facility] Refill Protocol. Has 3 month follow up scheduled for 11/27/18.    Prescription for lisinopril approved per [facility] Refill Protocol.    Velma JONES RN

## 2018-10-04 ENCOUNTER — ANTICOAGULATION THERAPY VISIT (OUTPATIENT)
Dept: ANTICOAGULATION | Facility: CLINIC | Age: 73
End: 2018-10-04
Payer: COMMERCIAL

## 2018-10-04 DIAGNOSIS — I48.20 CHRONIC ATRIAL FIBRILLATION (H): ICD-10-CM

## 2018-10-04 LAB — INR POINT OF CARE: 1.7 (ref 0.86–1.14)

## 2018-10-04 PROCEDURE — 85610 PROTHROMBIN TIME: CPT | Mod: QW

## 2018-10-04 PROCEDURE — 36416 COLLJ CAPILLARY BLOOD SPEC: CPT

## 2018-10-04 PROCEDURE — 99207 ZZC NO CHARGE NURSE ONLY: CPT

## 2018-10-04 NOTE — PROGRESS NOTES
ANTICOAGULATION FOLLOW-UP CLINIC VISIT    Patient Name:  Antoine Russo  Date:  10/4/2018  Contact Type:  Face to Face    SUBJECTIVE:     Patient Findings     Positives Change in diet/appetite (ate spinach omelets for a week when in [place]), Missed doses (missed a dose late last week sometime)    Comments Patient states he was out of town in [place] and ate spinach omelets daily. He also missed a dose within the last week. No other changes or concerns. No signs of clots. Will increase dose of warfarin today, then resume maintenance dose. Recheck in 2 weeks.           OBJECTIVE    INR Protime   Date Value Ref Range Status   10/04/2018 1.7 (A) 0.86 - 1.14 Final       ASSESSMENT / PLAN  INR assessment SUB missed dose/increase in greens   Recheck INR In: 2 WEEKS    INR Location Clinic      Anticoagulation Summary as of 10/4/2018     INR goal 2.0-3.0   Today's INR 1.7!   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

## 2018-10-04 NOTE — MR AVS SNAPSHOT
Antoine Russo   10/4/2018 10:00 AM   Anticoagulation Therapy Visit    Description:  72 year old male   Provider:  NB ANTI COAG   Department:  Nb Anticoag           INR as of 10/4/2018     Today's INR 1.7!      Anticoagulation Summary as of 10/4/2018     INR goal 2.0-3.0   Today's INR 1.7!   Full warfarin instructions 10/4: 5 mg; Otherwise 2.5 mg every day   Next INR check 10/18/2018    Indications   Chronic atrial fibrillation (H) [I48.2]  Long-term (current) use of anticoagulants [Z79.01] [Z79.01]         Description     Take 5 mg warfarin today. Then go back to 2.5 mg daily.      Your next Anticoagulation Clinic appointment(s)     Oct 18, 2018 10:45 AM CDT   Anticoagulation Visit with NB ANTI COAG   [facility]    [address] [place] [address] [phone]              Contact Numbers     Please call [phone] with any problems or questions regarding your therapy.    If you need to cancel and/or reschedule your appointment please call one of the following numbers:  [facility] [phone]  [facility] [phone]  [facility] [phone]  \[facility] [place] [facility]\"" [phone]  [place] - [phone]            October 2018 Details    Sun Mon Tue Wed Thu Fri Sat      1               2               3               4      5 mg   See details      5      2.5 mg         6      2.5 mg           7      2.5 mg         8      2.5 mg         9      2.5 mg         10      2.5 mg         11      2.5 mg         12      2.5 mg         13      2.5 mg           14      2.5 mg         15      2.5 mg         16      2.5 mg         17      2.5 mg         18            19               20                 21               22               23               24               25               26               27                 28               29               30               31                   Date Details   10/04 This INR check       Date of next INR:   10/18/2018         How to take your warfarin dose     To take:  2.5 mg Take 1 of the 2.5 mg tablets.    To take:  5 mg Take 2 of the 2.5 mg tablets.

## 2018-10-18 ENCOUNTER — ANTICOAGULATION THERAPY VISIT (OUTPATIENT)
Dept: ANTICOAGULATION | Facility: CLINIC | Age: 73
End: 2018-10-18
Payer: COMMERCIAL

## 2018-10-18 DIAGNOSIS — I48.20 CHRONIC ATRIAL FIBRILLATION (H): ICD-10-CM

## 2018-10-18 LAB — INR POINT OF CARE: 2.6 (ref 0.86–1.14)

## 2018-10-18 PROCEDURE — 85610 PROTHROMBIN TIME: CPT | Mod: QW

## 2018-10-18 PROCEDURE — 36416 COLLJ CAPILLARY BLOOD SPEC: CPT

## 2018-10-18 NOTE — MR AVS SNAPSHOT
Antoine Russo   10/18/2018 10:45 AM   Anticoagulation Therapy Visit    Description:  72 year old male   Provider:  NB ANTI COAMICHAEL   Department:  Nb Anticoag           INR as of 10/18/2018     Today's INR 2.6      Anticoagulation Summary as of 10/18/2018     INR goal 2.0-3.0   Today's INR 2.6   Full warfarin instructions 2.5 mg every day   Next INR check 11/29/2018    Indications   Chronic atrial fibrillation (H) [I48.2]  Long-term (current) use of anticoagulants [Z79.01] [Z79.01]         Your next Anticoagulation Clinic appointment(s)     Oct 18, 2018 10:45 AM CDT   Anticoagulation Visit with NB ANTI COAG   [facility]    [address] [place] [id number] [phone]            Nov 29, 2018  9:45 AM CST   Anticoagulation Visit with NB ANTI COAG   [facility]    [address] [place] [id number] [phone]              Contact Numbers     Please call [phone] with any problems or questions regarding your therapy.    If you need to cancel and/or reschedule your appointment please call one of the following numbers:  [facility] - [phone]  [facility] [phone]  [facility] [phone]  [facility] [phone]  [place] - [phone]            October 2018 Details    Sun Mon Tue Wed Thu Fri Sat      1               2               3               4               5               6                 7               8               9               10               11               12               13                 14               15               16               17               18      2.5 mg   See details      19      2.5 mg         20      2.5 mg           21      2.5 mg         22      2.5 mg         23      2.5 mg         24      2.5 mg         25      2.5 mg         26      2.5 mg         27      2.5 mg           28      2.5 mg         29      2.5 mg         30       2.5 mg         31      2.5 mg             Date Details   10/18 This INR check               How to take your warfarin dose     To take:  2.5 mg Take 1 of the 2.5 mg tablets.           November 2018 Details    Sun Mon Tue Wed Thu Fri Sat         1      2.5 mg         2      2.5 mg         3      2.5 mg           4      2.5 mg         5      2.5 mg         6      2.5 mg         7      2.5 mg         8      2.5 mg         9      2.5 mg         10      2.5 mg           11      2.5 mg         12      2.5 mg         13      2.5 mg         14      2.5 mg         15      2.5 mg         16      2.5 mg         17      2.5 mg           18      2.5 mg         19      2.5 mg         20      2.5 mg         21      2.5 mg         22      2.5 mg         23      2.5 mg         24      2.5 mg           25      2.5 mg         26      2.5 mg         27      2.5 mg         28      2.5 mg         29            30                 Date Details   No additional details    Date of next INR:  11/29/2018         How to take your warfarin dose     To take:  2.5 mg Take 1 of the 2.5 mg tablets.

## 2018-10-18 NOTE — PROGRESS NOTES
ANTICOAGULATION FOLLOW-UP CLINIC VISIT    Patient Name:  Antoine Russo  Date:  10/18/2018  Contact Type:  Face to Face    SUBJECTIVE:     Patient Findings     Positives No Problem Findings    Comments No changes in diet, activity level, medications (including over the counter), or health. No missed doses of warfarin. Patient took dosing as prescribed. No signs of clots or bleeding concerns. Patient will continue maintenance warfarin dosing.             OBJECTIVE    INR Protime   Date Value Ref Range Status   10/18/2018 2.6 (A) 0.86 - 1.14 Final       ASSESSMENT / PLAN  INR assessment THER    Recheck INR In: 6 WEEKS    INR Location Clinic      Anticoagulation Summary as of 10/18/2018     INR goal 2.0-3.0   Today's INR 2.6   Warfarin maintenance plan 2.5 mg (2.5 mg x 1) every day   Full warfarin instructions 2.5 mg every day   Weekly warfarin total 17.5 mg   No change documented Aparna Kunz RN   Plan last modified Angela Harrell RN (6/21/2018)   Next INR check 11/29/2018   Priority INR   Target end date Indefinite    Indications   Chronic atrial fibrillation (H) [I48.2]  Long-term (current) use of anticoagulants [Z79.01] [Z79.01]         Anticoagulation Episode Summary     INR check location     Preferred lab     Send INR reminders to [facility] ANTICO CLINIC POOL    Comments * 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 [phone]            See the Encounter Report to view Anticoagulation Flowsheet and Dosing Calendar (Go to Encounters tab in chart review, and find the Anticoagulation Therapy Visit)        Aparna Kunz RN

## 2018-10-21 ENCOUNTER — APPOINTMENT (OUTPATIENT)
Dept: CT IMAGING | Facility: CLINIC | Age: 73
End: 2018-10-21
Attending: STUDENT IN AN ORGANIZED HEALTH CARE EDUCATION/TRAINING PROGRAM
Payer: COMMERCIAL

## 2018-10-21 ENCOUNTER — HOSPITAL ENCOUNTER (INPATIENT)
Facility: CLINIC | Age: 73
LOS: 2 days | Discharge: HOME | End: 2018-10-23
Attending: STUDENT IN AN ORGANIZED HEALTH CARE EDUCATION/TRAINING PROGRAM | Admitting: INTERNAL MEDICINE
Payer: COMMERCIAL

## 2018-10-21 DIAGNOSIS — R10.12 LUQ ABDOMINAL PAIN: ICD-10-CM

## 2018-10-21 DIAGNOSIS — K85.90 ACUTE PANCREATITIS, UNSPECIFIED COMPLICATION STATUS, UNSPECIFIED PANCREATITIS TYPE: ICD-10-CM

## 2018-10-21 LAB
ALBUMIN SERPL-MCNC: 3.5 G/DL (ref 3.4–5)
ALP SERPL-CCNC: 41 U/L (ref 40–150)
ALT SERPL W P-5'-P-CCNC: 26 U/L (ref 0–70)
ANION GAP SERPL CALCULATED.3IONS-SCNC: 9 MMOL/L (ref 3–14)
AST SERPL W P-5'-P-CCNC: 21 U/L (ref 0–45)
BASOPHILS # BLD AUTO: 0 10E9/L (ref 0–0.2)
BASOPHILS NFR BLD AUTO: 0.2 %
BILIRUB SERPL-MCNC: 0.8 MG/DL (ref 0.2–1.3)
BUN SERPL-MCNC: 17 MG/DL (ref 7–30)
CALCIUM SERPL-MCNC: 8.8 MG/DL (ref 8.5–10.1)
CHLORIDE SERPL-SCNC: 102 MMOL/L (ref 94–109)
CO2 SERPL-SCNC: 26 MMOL/L (ref 20–32)
CREAT SERPL-MCNC: 0.86 MG/DL (ref 0.66–1.25)
DIFFERENTIAL METHOD BLD: ABNORMAL
EOSINOPHIL # BLD AUTO: 0 10E9/L (ref 0–0.7)
EOSINOPHIL NFR BLD AUTO: 0.2 %
ERYTHROCYTE [DISTWIDTH] IN BLOOD BY AUTOMATED COUNT: 16.1 % (ref 10–15)
ETHANOL SERPL-MCNC: <0.01 G/DL
GFR SERPL CREATININE-BSD FRML MDRD: 88 ML/MIN/1.7M2
GLUCOSE BLDC GLUCOMTR-MCNC: 131 MG/DL (ref 70–99)
GLUCOSE BLDC GLUCOMTR-MCNC: 162 MG/DL (ref 70–99)
GLUCOSE BLDC GLUCOMTR-MCNC: 170 MG/DL (ref 70–99)
GLUCOSE BLDC GLUCOMTR-MCNC: 173 MG/DL (ref 70–99)
GLUCOSE SERPL-MCNC: 196 MG/DL (ref 70–99)
HCT VFR BLD AUTO: 43.1 % (ref 40–53)
HGB BLD-MCNC: 14.2 G/DL (ref 13.3–17.7)
IMM GRANULOCYTES # BLD: 0.1 10E9/L (ref 0–0.4)
IMM GRANULOCYTES NFR BLD: 0.6 %
INR PPP: 2.51 (ref 0.86–1.14)
LIPASE SERPL-CCNC: 942 U/L (ref 73–393)
LYMPHOCYTES # BLD AUTO: 1.1 10E9/L (ref 0.8–5.3)
LYMPHOCYTES NFR BLD AUTO: 6.2 %
MCH RBC QN AUTO: 31.8 PG (ref 26.5–33)
MCHC RBC AUTO-ENTMCNC: 32.9 G/DL (ref 31.5–36.5)
MCV RBC AUTO: 97 FL (ref 78–100)
MONOCYTES # BLD AUTO: 1.1 10E9/L (ref 0–1.3)
MONOCYTES NFR BLD AUTO: 6.1 %
NEUTROPHILS # BLD AUTO: 15.7 10E9/L (ref 1.6–8.3)
NEUTROPHILS NFR BLD AUTO: 86.7 %
NRBC # BLD AUTO: 0 10*3/UL
NRBC BLD AUTO-RTO: 0 /100
PLATELET # BLD AUTO: 200 10E9/L (ref 150–450)
POTASSIUM SERPL-SCNC: 4.5 MMOL/L (ref 3.4–5.3)
PROT SERPL-MCNC: 7.5 G/DL (ref 6.8–8.8)
RBC # BLD AUTO: 4.46 10E12/L (ref 4.4–5.9)
SODIUM SERPL-SCNC: 137 MMOL/L (ref 133–144)
TROPONIN I SERPL-MCNC: <0.015 UG/L (ref 0–0.04)
WBC # BLD AUTO: 18.1 10E9/L (ref 4–11)

## 2018-10-21 PROCEDURE — 12000000 ZZH R&B MED SURG/OB

## 2018-10-21 PROCEDURE — 25000128 H RX IP 250 OP 636: Performed by: STUDENT IN AN ORGANIZED HEALTH CARE EDUCATION/TRAINING PROGRAM

## 2018-10-21 PROCEDURE — 99222 1ST HOSP IP/OBS MODERATE 55: CPT | Mod: AI | Performed by: INTERNAL MEDICINE

## 2018-10-21 PROCEDURE — 96361 HYDRATE IV INFUSION ADD-ON: CPT | Performed by: STUDENT IN AN ORGANIZED HEALTH CARE EDUCATION/TRAINING PROGRAM

## 2018-10-21 PROCEDURE — 99285 EMERGENCY DEPT VISIT HI MDM: CPT | Mod: 25 | Performed by: STUDENT IN AN ORGANIZED HEALTH CARE EDUCATION/TRAINING PROGRAM

## 2018-10-21 PROCEDURE — 80053 COMPREHEN METABOLIC PANEL: CPT | Performed by: STUDENT IN AN ORGANIZED HEALTH CARE EDUCATION/TRAINING PROGRAM

## 2018-10-21 PROCEDURE — 85025 COMPLETE CBC W/AUTO DIFF WBC: CPT | Performed by: STUDENT IN AN ORGANIZED HEALTH CARE EDUCATION/TRAINING PROGRAM

## 2018-10-21 PROCEDURE — 83690 ASSAY OF LIPASE: CPT | Performed by: STUDENT IN AN ORGANIZED HEALTH CARE EDUCATION/TRAINING PROGRAM

## 2018-10-21 PROCEDURE — 00000146 ZZHCL STATISTIC GLUCOSE BY METER IP

## 2018-10-21 PROCEDURE — 84484 ASSAY OF TROPONIN QUANT: CPT | Performed by: STUDENT IN AN ORGANIZED HEALTH CARE EDUCATION/TRAINING PROGRAM

## 2018-10-21 PROCEDURE — 96374 THER/PROPH/DIAG INJ IV PUSH: CPT | Mod: 59 | Performed by: STUDENT IN AN ORGANIZED HEALTH CARE EDUCATION/TRAINING PROGRAM

## 2018-10-21 PROCEDURE — 85610 PROTHROMBIN TIME: CPT | Performed by: STUDENT IN AN ORGANIZED HEALTH CARE EDUCATION/TRAINING PROGRAM

## 2018-10-21 PROCEDURE — 25000125 ZZHC RX 250: Performed by: STUDENT IN AN ORGANIZED HEALTH CARE EDUCATION/TRAINING PROGRAM

## 2018-10-21 PROCEDURE — 80320 DRUG SCREEN QUANTALCOHOLS: CPT | Performed by: STUDENT IN AN ORGANIZED HEALTH CARE EDUCATION/TRAINING PROGRAM

## 2018-10-21 PROCEDURE — 25000132 ZZH RX MED GY IP 250 OP 250 PS 637: Performed by: INTERNAL MEDICINE

## 2018-10-21 PROCEDURE — 99285 EMERGENCY DEPT VISIT HI MDM: CPT | Mod: Z6 | Performed by: STUDENT IN AN ORGANIZED HEALTH CARE EDUCATION/TRAINING PROGRAM

## 2018-10-21 PROCEDURE — 25000131 ZZH RX MED GY IP 250 OP 636 PS 637: Performed by: INTERNAL MEDICINE

## 2018-10-21 PROCEDURE — 74177 CT ABD & PELVIS W/CONTRAST: CPT

## 2018-10-21 RX ORDER — DEXTROSE MONOHYDRATE 25 G/50ML
25-50 INJECTION, SOLUTION INTRAVENOUS
Status: DISCONTINUED | OUTPATIENT
Start: 2018-10-21 | End: 2018-10-23

## 2018-10-21 RX ORDER — ACETAMINOPHEN 325 MG/1
650 TABLET ORAL EVERY 4 HOURS PRN
Status: DISCONTINUED | OUTPATIENT
Start: 2018-10-21 | End: 2018-10-23 | Stop reason: HOSPADM

## 2018-10-21 RX ORDER — WARFARIN SODIUM 2.5 MG/1
2.5 TABLET ORAL
Status: COMPLETED | OUTPATIENT
Start: 2018-10-21 | End: 2018-10-21

## 2018-10-21 RX ORDER — NALOXONE HYDROCHLORIDE 0.4 MG/ML
.1-.4 INJECTION, SOLUTION INTRAMUSCULAR; INTRAVENOUS; SUBCUTANEOUS
Status: DISCONTINUED | OUTPATIENT
Start: 2018-10-21 | End: 2018-10-23 | Stop reason: HOSPADM

## 2018-10-21 RX ORDER — TAMSULOSIN HYDROCHLORIDE 0.4 MG/1
0.4 CAPSULE ORAL DAILY
Status: DISCONTINUED | OUTPATIENT
Start: 2018-10-21 | End: 2018-10-23 | Stop reason: HOSPADM

## 2018-10-21 RX ORDER — GABAPENTIN 600 MG/1
600 TABLET ORAL AT BEDTIME
Status: DISCONTINUED | OUTPATIENT
Start: 2018-10-21 | End: 2018-10-23 | Stop reason: HOSPADM

## 2018-10-21 RX ORDER — MULTIPLE VITAMINS W/ MINERALS TAB 9MG-400MCG
1 TAB ORAL DAILY
Status: DISCONTINUED | OUTPATIENT
Start: 2018-10-21 | End: 2018-10-23 | Stop reason: HOSPADM

## 2018-10-21 RX ORDER — ONDANSETRON 4 MG/1
4 TABLET, ORALLY DISINTEGRATING ORAL EVERY 6 HOURS PRN
Status: DISCONTINUED | OUTPATIENT
Start: 2018-10-21 | End: 2018-10-23 | Stop reason: HOSPADM

## 2018-10-21 RX ORDER — IOPAMIDOL 755 MG/ML
93 INJECTION, SOLUTION INTRAVASCULAR ONCE
Status: COMPLETED | OUTPATIENT
Start: 2018-10-21 | End: 2018-10-21

## 2018-10-21 RX ORDER — LORAZEPAM 2 MG/ML
0.5 INJECTION INTRAMUSCULAR EVERY 4 HOURS PRN
Status: DISCONTINUED | OUTPATIENT
Start: 2018-10-21 | End: 2018-10-23 | Stop reason: HOSPADM

## 2018-10-21 RX ORDER — GABAPENTIN 300 MG/1
300 CAPSULE ORAL EVERY MORNING
Status: DISCONTINUED | OUTPATIENT
Start: 2018-10-21 | End: 2018-10-23 | Stop reason: HOSPADM

## 2018-10-21 RX ORDER — ONDANSETRON 2 MG/ML
4 INJECTION INTRAMUSCULAR; INTRAVENOUS EVERY 6 HOURS PRN
Status: DISCONTINUED | OUTPATIENT
Start: 2018-10-21 | End: 2018-10-23 | Stop reason: HOSPADM

## 2018-10-21 RX ORDER — HYDROCODONE BITARTRATE AND ACETAMINOPHEN 5; 325 MG/1; MG/1
1 TABLET ORAL EVERY 6 HOURS PRN
Status: DISCONTINUED | OUTPATIENT
Start: 2018-10-21 | End: 2018-10-22

## 2018-10-21 RX ORDER — HYDROMORPHONE HYDROCHLORIDE 1 MG/ML
0.5 INJECTION, SOLUTION INTRAMUSCULAR; INTRAVENOUS; SUBCUTANEOUS
Status: DISCONTINUED | OUTPATIENT
Start: 2018-10-21 | End: 2018-10-22

## 2018-10-21 RX ORDER — NICOTINE POLACRILEX 4 MG
15-30 LOZENGE BUCCAL
Status: DISCONTINUED | OUTPATIENT
Start: 2018-10-21 | End: 2018-10-23

## 2018-10-21 RX ORDER — LISINOPRIL 5 MG/1
5 TABLET ORAL DAILY
Status: DISCONTINUED | OUTPATIENT
Start: 2018-10-21 | End: 2018-10-23 | Stop reason: HOSPADM

## 2018-10-21 RX ORDER — AMOXICILLIN 250 MG
1 CAPSULE ORAL 2 TIMES DAILY
Status: DISCONTINUED | OUTPATIENT
Start: 2018-10-21 | End: 2018-10-23 | Stop reason: HOSPADM

## 2018-10-21 RX ORDER — OXYCODONE AND ACETAMINOPHEN 5; 325 MG/1; MG/1
1-2 TABLET ORAL EVERY 4 HOURS PRN
Status: DISCONTINUED | OUTPATIENT
Start: 2018-10-21 | End: 2018-10-21

## 2018-10-21 RX ORDER — AMOXICILLIN 250 MG
2 CAPSULE ORAL 2 TIMES DAILY
Status: DISCONTINUED | OUTPATIENT
Start: 2018-10-21 | End: 2018-10-23 | Stop reason: HOSPADM

## 2018-10-21 RX ORDER — LORAZEPAM 0.5 MG/1
0.5 TABLET ORAL EVERY 4 HOURS PRN
Status: DISCONTINUED | OUTPATIENT
Start: 2018-10-21 | End: 2018-10-23 | Stop reason: HOSPADM

## 2018-10-21 RX ORDER — SODIUM CHLORIDE, SODIUM LACTATE, POTASSIUM CHLORIDE, CALCIUM CHLORIDE 600; 310; 30; 20 MG/100ML; MG/100ML; MG/100ML; MG/100ML
INJECTION, SOLUTION INTRAVENOUS CONTINUOUS
Status: DISCONTINUED | OUTPATIENT
Start: 2018-10-21 | End: 2018-10-23 | Stop reason: HOSPADM

## 2018-10-21 RX ADMIN — SODIUM CHLORIDE, POTASSIUM CHLORIDE, SODIUM LACTATE AND CALCIUM CHLORIDE 1000 ML: 600; 310; 30; 20 INJECTION, SOLUTION INTRAVENOUS at 04:17

## 2018-10-21 RX ADMIN — SODIUM CHLORIDE, POTASSIUM CHLORIDE, SODIUM LACTATE AND CALCIUM CHLORIDE: 600; 310; 30; 20 INJECTION, SOLUTION INTRAVENOUS at 14:16

## 2018-10-21 RX ADMIN — GABAPENTIN 600 MG: 600 TABLET, FILM COATED ORAL at 21:35

## 2018-10-21 RX ADMIN — SODIUM CHLORIDE, POTASSIUM CHLORIDE, SODIUM LACTATE AND CALCIUM CHLORIDE: 600; 310; 30; 20 INJECTION, SOLUTION INTRAVENOUS at 06:13

## 2018-10-21 RX ADMIN — TAMSULOSIN HYDROCHLORIDE 0.4 MG: 0.4 CAPSULE ORAL at 08:49

## 2018-10-21 RX ADMIN — GABAPENTIN 300 MG: 300 CAPSULE ORAL at 08:49

## 2018-10-21 RX ADMIN — METOPROLOL SUCCINATE 75 MG: 50 TABLET, EXTENDED RELEASE ORAL at 21:34

## 2018-10-21 RX ADMIN — SODIUM CHLORIDE 63 ML: 9 INJECTION, SOLUTION INTRAVENOUS at 04:00

## 2018-10-21 RX ADMIN — SODIUM CHLORIDE, POTASSIUM CHLORIDE, SODIUM LACTATE AND CALCIUM CHLORIDE: 600; 310; 30; 20 INJECTION, SOLUTION INTRAVENOUS at 22:11

## 2018-10-21 RX ADMIN — WARFARIN SODIUM 2.5 MG: 2.5 TABLET ORAL at 17:31

## 2018-10-21 RX ADMIN — HYDROMORPHONE HYDROCHLORIDE 1 MG: 1 INJECTION, SOLUTION INTRAMUSCULAR; INTRAVENOUS; SUBCUTANEOUS at 04:18

## 2018-10-21 RX ADMIN — HYDROMORPHONE HYDROCHLORIDE 0.5 MG: 1 INJECTION, SOLUTION INTRAMUSCULAR; INTRAVENOUS; SUBCUTANEOUS at 11:01

## 2018-10-21 RX ADMIN — METOPROLOL SUCCINATE 75 MG: 50 TABLET, EXTENDED RELEASE ORAL at 08:49

## 2018-10-21 RX ADMIN — INSULIN ASPART 1 UNITS: 100 INJECTION, SOLUTION INTRAVENOUS; SUBCUTANEOUS at 08:58

## 2018-10-21 RX ADMIN — HYDROCODONE BITARTRATE AND ACETAMINOPHEN 1 TABLET: 5; 325 TABLET ORAL at 23:52

## 2018-10-21 RX ADMIN — MULTIPLE VITAMINS W/ MINERALS TAB 1 TABLET: TAB at 08:49

## 2018-10-21 RX ADMIN — LISINOPRIL 5 MG: 5 TABLET ORAL at 08:49

## 2018-10-21 RX ADMIN — IOPAMIDOL 93 ML: 755 INJECTION, SOLUTION INTRAVENOUS at 04:00

## 2018-10-21 ASSESSMENT — ACTIVITIES OF DAILY LIVING (ADL)
ADLS_ACUITY_SCORE: 11

## 2018-10-21 NOTE — IP AVS SNAPSHOT
[facility]    [address] [facility] [address]    Phone:  [phone]    Fax:  [phone]                                       After Visit Summary   10/21/2018    Antoine Russo    MRN: [id number]           After Visit Summary Signature Page     I have received my discharge instructions, and my questions have been answered. I have discussed any challenges I see with this plan with the nurse or doctor.    ..........................................................................................................................................  Patient/Patient Representative Signature      ..........................................................................................................................................  Patient Representative Print Name and Relationship to Patient    ..................................................               ................................................  Date                                   Time    ..........................................................................................................................................  Reviewed by Signature/Title    ...................................................              ..............................................  Date                                               Time          22EPIC Rev 08/18

## 2018-10-21 NOTE — IP AVS SNAPSHOT
MRN:[id number]                      After Visit Summary   10/21/2018    Antoine Russo    MRN: [id number]           Thank you!     Thank you for choosing [facility] for your care. Our goal is always to provide you with excellent care. Hearing back from our patients is one way we can continue to improve our services. Please take a few minutes to complete the written survey that you may receive in the mail after you visit with us. Thank you!        Patient Information     Date Of Birth          1945        Designated Caregiver       Most Recent Value    Caregiver    Will someone help with your care after discharge? no      About your hospital stay     You were admitted on:  October 21, 2018 You last received care in the:  [facility]    You were discharged on:  October 23, 2018       Who to Call     For medical emergencies, please call 911.  For non-urgent questions about your medical care, please call your primary care provider or clinic, [phone]          Attending Provider     Provider Specialty    Stas Bryan, DO Emergency Medicine    Jessica Finch MD Internal Medicine    Citlalli Mejias, DO Internal Medicine    Willam Nevarez MD Family Practice       Primary Care Provider Office Phone # Fax #    Katie Gross [phone]      Your next 10 appointments already scheduled     Nov 12, 2018  8:45 AM CST   [facility] Visit with Anais Justice MD   [facility]    [address] [address]   [phone]            Nov 27, 2018  9:00 AM CST   [facility] Visit with Katie Gross MD   [facility]    [address] [place] [id number]   [phone]           Bring a current list of meds and any records pertaining to this visit. For Physicals, please bring immunization records and any forms needing to be filled  "out. Please arrive 10 minutes early to complete paperwork.            Nov 29, 2018  9:45 AM CST   Anticoagulation [facility] with [facility]   [facility]    [address] [place] [id number] [phone]              Further instructions from your care team       Warfarin discharge instructions:    Continue warfarin 2.5mg every evening.    Keep the appointment that you have scheduled with anticoagulation clinic for 11/29/18 for your INR check.     Pending Results     No orders found from 10/19/2018 to 10/22/2018.            Statement of Approval     Ordered          10/23/18 1529  I have reviewed and agree with all the recommendations and orders detailed in this document.  EFFECTIVE NOW     Approved and electronically signed by:  Willam Nevarez MD             Admission Information     Date & Time Provider Department Dept. Phone    10/21/2018 Willam Nevarez MD [facility] Surgical [phone]      Your Vitals Were     Blood Pressure Pulse Temperature Respirations Height Weight    128/56 (BP Location: Right arm) 81 98.1  F (36.7  C) (Oral) 18 1.778 m (5' 10\") 90.5 kg (199 lb 8.3 oz)    Pulse Oximetry BMI (Body Mass Index)                91% 28.63 kg/m2          [employer] Information     [employer] gives you secure access to your electronic health record. If you see a primary care provider, you can also send messages to your care team and make appointments. If you have questions, please call your primary care clinic.  If you do not have a primary care provider, please call [phone] and they will assist you.        Care EveryWhere ID     This is your Care EveryWhere ID. This could be used by other organizations to access your [facility] medical records  [id number]        Equal Access to Services     ABBEY WALTON : Raquel Bernstein, charli block, koffi adler. So waghislaine " [phone].    ATENCIÓN: Si elli wright, tiene a snyder disposición servicios gratuitos de asistencia lingüística. Kia kuhn [phone].    We comply with applicable federal civil rights laws and [place] laws. We do not discriminate on the basis of race, color, national origin, age, disability, sex, sexual orientation, or gender identity.               Review of your medicines      START taking        Dose / Directions    HYDROcodone-acetaminophen 5-325 MG per tablet   Commonly known as:  NORCO   Used for:  Acute pancreatitis, unspecified complication status, unspecified pancreatitis type        Dose:  1 tablet   Take 1 tablet by mouth every 6 hours as needed for moderate to severe pain   Quantity:  5 tablet   Refills:  0         CONTINUE these medicines which have NOT CHANGED        Dose / Directions    ASPIRIN NOT PRESCRIBED   Commonly known as:  INTENTIONAL        Antiplatelet medication not prescribed intentionally due to Current anticoagulant therapy (warfarin/enoxaparin)   Refills:  0       gabapentin 300 MG capsule   Commonly known as:  NEURONTIN   Used for:  Type 2 diabetes mellitus with diabetic polyneuropathy, without long-term current use of insulin (H)        Take one in am, and 2 tablest (600 mg) every night   Quantity:  270 capsule   Refills:  3       lisinopril 5 MG tablet   Commonly known as:  PRINIVIL/ZESTRIL   Used for:  Hypertension, benign essential, goal below 140/90        TAKE 1 TABLET BY MOUTH ONCE DAILY   Quantity:  90 tablet   Refills:  1       metFORMIN 500 MG 24 hr tablet   Commonly known as:  GLUCOPHAGE-XR   Used for:  Type 2 diabetes mellitus with diabetic polyneuropathy, without long-term current use of insulin (H)        TAKE TWO TABLETS BY MOUTH TWICE DAILY WITH MEALS   Quantity:  360 tablet   Refills:  0       metoprolol succinate 50 MG 24 hr tablet   Commonly known as:  TOPROL-XL   Used for:  Hypertension, benign essential, goal below 140/90        Dose:  75 mg   Take 1.5  tablets (75 mg) by mouth 2 times daily   Quantity:  270 tablet   Refills:  3       multivitamin, therapeutic with minerals Tabs tablet   Used for:  Surgery aftercare        Dose:  1 tablet   Take 1 tablet by mouth daily.   Quantity:  30 each   Refills:  1       tamsulosin 0.4 MG capsule   Commonly known as:  FLOMAX   Used for:  Urgency of urination, Hypertrophy of prostate without urinary obstruction        Dose:  0.4 mg   Take 1 capsule (0.4 mg) by mouth daily   Quantity:  90 capsule   Refills:  3       warfarin 2.5 MG tablet   Commonly known as:  COUMADIN        Dose:  2.5 mg   Take 2.5 mg by mouth daily 2.5 mg daily or as directed by Anticoagulation Clinic   Quantity:  90 tablet   Refills:  0            Where to get your medicines      Some of these will need a paper prescription and others can be bought over the counter. Ask your nurse if you have questions.     Bring a paper prescription for each of these medications     HYDROcodone-acetaminophen 5-325 MG per tablet                Protect others around you: Learn how to safely use, store and throw away your medicines at www.disposemymeds.org.        Information about OPIOIDS     PRESCRIPTION OPIOIDS: WHAT YOU NEED TO KNOW   We gave you an opioid (narcotic) pain medicine. It is important to manage your pain, but opioids are not always the best choice. You should first try all the other options your care team gave you. Take this medicine for as short a time (and as few doses) as possible.    Some activities can increase your pain, such as bandage changes or therapy sessions. It may help to take your pain medicine 30 to 60 minutes before these activities. Reduce your stress by getting enough sleep, working on hobbies you enjoy and practicing relaxation or meditation. Talk to your care team about ways to manage your pain beyond prescription opioids.    These medicines have risks:    DO NOT drive when on new or higher doses of pain medicine. These medicines can  affect your alertness and reaction times, and you could be arrested for driving under the influence (DUI). If you need to use opioids long-term, talk to your care team about driving.    DO NOT operate heavy machinery    DO NOT do any other dangerous activities while taking these medicines.    DO NOT drink any alcohol while taking these medicines.     If the opioid prescribed includes acetaminophen, DO NOT take with any other medicines that contain acetaminophen. Read all labels carefully. Look for the word  acetaminophen  or  Tylenol.  Ask your pharmacist if you have questions or are unsure.    You can get addicted to pain medicines, especially if you have a history of addiction (chemical, alcohol or substance dependence). Talk to your care team about ways to reduce this risk.    All opioids tend to cause constipation. Drink plenty of water and eat foods that have a lot of fiber, such as fruits, vegetables, prune juice, apple juice and high-fiber cereal. Take a laxative (Miralax, milk of magnesia, Colace, Senna) if you don t move your bowels at least every other day. Other side effects include upset stomach, sleepiness, dizziness, throwing up, tolerance (needing more of the medicine to have the same effect), physical dependence and slowed breathing.    Store your pills in a secure place, locked if possible. We will not replace any lost or stolen medicine. If you don t finish your medicine, please throw away (dispose) as directed by your pharmacist. The [place] Pollution [employer] has more information about safe disposal: https://www.pca.[facility].[place].us/living-green/managing-unwanted-medications             Medication List: This is a list of all your medications and when to take them. Check marks below indicate your daily home schedule. Keep this list as a reference.      Medications           Morning Afternoon Evening Bedtime As Needed    ASPIRIN NOT PRESCRIBED   Commonly known as:  INTENTIONAL   Antiplatelet  medication not prescribed intentionally due to Current anticoagulant therapy (warfarin/enoxaparin)                                gabapentin 300 MG capsule   Commonly known as:  NEURONTIN   Take one in am, and 2 tablest (600 mg) every night   Last time this was given:  300 mg on 10/23/2018  8:17 AM                                   HYDROcodone-acetaminophen 5-325 MG per tablet   Commonly known as:  NORCO   Take 1 tablet by mouth every 6 hours as needed for moderate to severe pain   Last time this was given:  1 tablet on 10/23/2018  9:35 AM                                   lisinopril 5 MG tablet   Commonly known as:  PRINIVIL/ZESTRIL   TAKE 1 TABLET BY MOUTH ONCE DAILY   Last time this was given:  5 mg on 10/23/2018  8:17 AM                                   metFORMIN 500 MG 24 hr tablet   Commonly known as:  GLUCOPHAGE-XR   TAKE TWO TABLETS BY MOUTH TWICE DAILY WITH MEALS                                metoprolol succinate 50 MG 24 hr tablet   Commonly known as:  TOPROL-XL   Take 1.5 tablets (75 mg) by mouth 2 times daily   Last time this was given:  75 mg on 10/23/2018  8:18 AM                                      multivitamin, therapeutic with minerals Tabs tablet   Take 1 tablet by mouth daily.   Last time this was given:  1 tablet on 10/23/2018  8:17 AM                                   tamsulosin 0.4 MG capsule   Commonly known as:  FLOMAX   Take 1 capsule (0.4 mg) by mouth daily   Last time this was given:  0.4 mg on 10/23/2018  8:18 AM                                   warfarin 2.5 MG tablet   Commonly known as:  COUMADIN   Take 2.5 mg by mouth daily 2.5 mg daily or as directed by Anticoagulation Clinic   Last time this was given:  2.5 mg on 10/22/2018  5:58 PM

## 2018-10-21 NOTE — PROGRESS NOTES
Skin affirmation note    Admitting nurse completed full skin assessment, Romain score and Romain interventions. This writer agrees with the initial skin assessment findings.    B Penasco RN

## 2018-10-21 NOTE — PHARMACY-ANTICOAGULATION SERVICE
Clinical Pharmacy - Warfarin Dosing Consult     Pharmacy has been consulted to manage this patient s warfarin therapy.  Indication: Atrial Fibrillation  Therapy Goal: INR 2-3  Provider/Team: [facility]: AURELIO GUZMAN  Warfarin Prior to Admission: Yes  Warfarin PTA Regimen: 2.5mg daily  Recent documented change in oral intake/nutrition: Unknown    INR   Date Value Ref Range Status   10/21/2018 2.51 (H) 0.86 - 1.14 Final     INR Protime   Date Value Ref Range Status   10/18/2018 2.6 (A) 0.86 - 1.14 Final       Recommend warfarin 2.5 mg today.  Pharmacy will monitor Antoine Russo daily and order warfarin doses to achieve specified goal.      Please contact pharmacy as soon as possible if the warfarin needs to be held for a procedure or if the warfarin goals change.

## 2018-10-21 NOTE — ED TRIAGE NOTES
Patient here with c/o LUQ abdominal pain x2days, now 9/10,  Constant, Non radiating, achy in nature. Denies vomiting but did feel nauseated earlier, normal stools. Has had multiple bouts of pancreatitis in the past, drinks currently 3-4 beers/day

## 2018-10-21 NOTE — PROGRESS NOTES
"[facility] ADMISSION NOTE    Patient admitted to room 2215 at approximately 0600 via cart from emergency room. Patient was accompanied by transport tech.     Verbal SBAR report received from Destiny SORENSEN prior to patient arrival.     Patient ambulated to bed independently. Patient alert and oriented X 3. The patient is not having any pain.  . Admission vital signs: Blood pressure 148/68, pulse 100, temperature 99.2  F (37.3  C), temperature source Oral, resp. rate 18, height 1.778 m (5' 10\"), weight 89.5 kg (197 lb 5 oz), SpO2 96 %. Patient was oriented to plan of care, call light, bed controls, tv, telephone, bathroom and visiting hours.     Risk Assessment    The following safety risks were identified during admission: none. Yellow risk band applied: NO.     Skin Initial Assessment    This writer admitted this patient and completed a full skin assessment and Romain score in the Adult PCS flowsheet. Appropriate interventions initiated as needed.     Secondary skin check completed by Alisha SORENSEN     Skin  Inspection of bony prominences: Full  Skin WDL:  WDL except, characteristics  Skin Temperature: warm  Skin Moisture: dry  Skin Elasticity: quick return to original state  Skin Integrity: scar(s)    Romain Risk Assessment  Sensory Perception: 4-->no impairment  Moisture: 4-->rarely moist  Activity: 4-->walks frequently  Mobility: 4-->no limitation  Nutrition: 3-->adequate  Friction and Shear: 3-->no apparent problem  Romain Score: 22  Bed Support Surface: Atmos Air mattress  Reassessed using Bed Algorithm: No    Pt is comfortable, states injection from ED still working well.  JESSICA Moss RN    "

## 2018-10-21 NOTE — H&P
[facility] History and Physical    Antoine Russo MRN# [id number]   Age: 72 year old YOB: 1945     Date of Admission:  10/21/2018      Primary care provider: Katie Gross          Assessment and Plan:   This patient is a 72 year old male with a past medical history of chronic pancreatitis due to IPMN and alcohol, paroxysmal A. fib, type 2 diabetes, hypertension history of colitis status post partial colectomy, history of head and neck cancer who presents with 2 days of left upper abdominal and epigastric pain and nausea, and found to have acute recurrent pancreatitis.    Acute on chronic Pancreatitis: due to EtOH.  He has a history of a distal pancreatectomy for IPMN of the tail in the setting of chronic pancreatitis.  He has history of necrotizing pancreatitis, pancreatic duct leak, and a history of a stent earlier in 2018, since removed.  He was abstinent from EtOH from Jan-July, then started using 3-4 beers/day starting in July.  He also stopped his pancreatic enzymes in June due to cost.    --NPO  --IVF /hr  --Dilaudid 0.5 mg IV Q 1 hr PRN  --Percocet 1-2 Q 4 hr PRN  --Zofran IV/PO PRN    Diabetes-2 with Peripheral Neuropathy: A1c 7.1 in Aug.  On metformin, doesn't check blood sugar  --BID blood sugar check, low dose sliding scale novolog while NPO.  Consider more frequent check is hyper/hypo -glycemia  --Hold metformin  --continue gabapentin      parox A-fib: chronic and stable, continue prior to admission warfarin    Hypertension: chronic and stable.  Blood pressure elevated since admission, will monitor closely  --continue prior to admission lisinopril and metoprolol    BPH: chronic and stable.  Monitor for urine retention. continue flomax    Non-Obstructive CAD:  Seen on angiogram 2009, no stents or history of MI.  Not on statin for unclear reasons.  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 fib, type 2 diabetes, hypertension history of colitis status post partial colectomy, history of head and neck cancer who presents with 2 days of left upper abdominal and epigastric pain and nausea, and found to have acute recurrent pancreatitis.    Reports gradual onset of left upper abdominal and epigastric pain over the last 2-3 days.  It significantly worsened hours prior to ER presentation.  He has not changed his diet or taking any medications to help with the pain.  He was abstinent from alcohol from January through mid July.  Starting in mid July, he has been using 3-4 beers per day.  His last hospital admission for pancreatitis was in April.  He had necrotizing pancreatitis at that time.  He was previously on pancreatic enzymes, but stopped these in about June, because they are too expensive             Past Medical History:   I have reviewed this patient's past medical history.  Patient Active Problem List    Diagnosis Date Noted     Anticipated difficulty with intubation 05/23/2017     Priority: High     Class: Chronic     Difficult two hands mask ventilation, intubated multiple times asleep with video laryngoscope. H/o tongue cancer surgery.        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 Please refer if Care Coordination services are needed.    Presenting Problem Signs and Symptoms Treatment Plan   Questions or concerns   during clinic hours   I will call my clinic directly:  [address], [place] [address]  [phone].   Questions or concerns outside clinic hours   I will call the 24 hour nurse line at   [phone] or [facility].   Need to schedule an appointment   I will call the 24 hour scheduling team at [phone] or my clinic directly at [phone].   Same day treatment     I will call my clinic first, nurse line if after hours, urgent care and express care if needed.   Clinic care coordination services (regular clinic hours)   I will call a clinic care coordinator directly:     Shelia Emanuel RN [facility]  [phone]    Brianna Cristobal :    [phone]    Or call my clinic at [phone] and ask to speak with care coordination.   Crisis Services: Behavioral or Mental Health  [facility] 24 Hour Phone Line  [phone]    [facility] 24 Hour Crisis Services  [phone]    [facility] ([facility] Prov[facility] [phone]    [facility]   [phone]     Emergency treatment -- Immediately    [facility] 911                Clostridium difficile enterocolitis 12/18/2012     Priority: Medium     Groin fluid collection 12/15/2012     Priority: Medium     CT 12/12- New  (since 5/11) collection measuring at least 2.3 cm in diameter and 6.5 cm in length within the right inguinal canal. 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 Gerson Ravi and aristides at the  in 2012  NAD 2017  CT scan of the neck every 2-3 years.  - Thyroid labs yearly.  - Carotid ultrasound in three to four years to evaluate for stenosis.       Colon polyp 08/26/2011     Priority: Medium     Colonoscopy 8/2011-A sessile polyp was found in the cecum. The polyp was 6 mm in size. The polyp was removed with a hot snare. Resection and retrieval were complete. A sessile polyp was found in the proximal transverse colon. The polyp was 15 mm in size. The polyp was removed with a hot snare. Resection and retrieval were complete. A sessile polyp was found in the sigmoid colon. The polyp was 5 mm in size       Advanced directives, counseling/discussion 06/03/2011     Priority: Medium     Hyperlipidemia LDL goal <100 10/31/2010     Priority: Medium     CAD (coronary artery disease) 05/26/2009     Priority: Medium     Stress testing 2009 showed inferior wall ischemia.  Cath preformed; identified moderate CAD with stenosis of 40-50% in LAD and RCA.  No stents were placed.  Echo in 01/2018 (done while in Afib with rates of 100-110); EF of 55-60%.  No RWMA.       GERD (gastroesophageal reflux disease) 05/26/2009     Priority: Medium     Hypertrophy of breast 09/25/2007     Priority: Medium     Diverticulitis of colon 07/17/2007     Priority: Medium     Colitis on CT Scan 5/2011- [place]  Colonoscopy 8/2011 Diverticulitis - Multiple small and large-mouthed  diverticula were found in the mid sigmoid colon and at the hepatic flexure. There was narrowing of the colon in association with the diverticular opening. Nena-diverticular erythema was seen. There was evidence of an impacted diverticulum. Purulent discharge was seen in association with the diverticlar opening. Biopsies were taken with a cold forceps for histology. Multiple small and large-mouthed diverticula were found in the sigmoid colon, in the descending colon, in the transverse colon and in the ascending colon.   Hospitalized diverticulitis 12/12           PERS HX TOBACCO USE - quit in 11/06 with chantix 03/15/2007     Priority: Medium     Hypertension, benign essential, goal below 140/90 11/07/2005     Priority: Medium     Patient has only fair bp control and with family history of diabetes will all ace if lab indicated also has bph and may op for psa and not digital exam next yr        Pain in joint, shoulder region 11/07/2005     Priority: Medium     Hypertrophy of prostate without urinary obstruction 11/07/2005     Priority: Medium     Problem list name updated by automated process. Provider to review       Impotence of organic origin 11/07/2005     Priority: Medium              Past Surgical History:   I have reviewed this patient's past surgical history   Past Surgical History:   Procedure Laterality Date     BREAST SURGERY  2008    right breast mass benign     COLONOSCOPY      multiple polyps removed     COLONOSCOPY  8/24/2011    Procedure:COMBINED COLONOSCOPY, REMOVE TUMOR/POLYP/LESION BY SNARE; Surgeon:MILEY ARBOLEDA; Location:[facility]     COLONOSCOPY  12/17/2012    Procedure: COLONOSCOPY;;  Surgeon: Leon Maurer MD;  Location:  GI     COLONOSCOPY  12/18/2012    Procedure: COLONOSCOPY;;  Surgeon: Leon Maurer MD;  Location:  GI     DENERVATION OF SPERMATIC CORD MICROSURGICAL Left 5/23/2017    Procedure: DENERVATION OF SPERMATIC CORD MICROSURGICAL;;  Surgeon: Marcio Aggarwal,  MD;  [facility]     DISSECTION RADICAL NECK BILATERAL  8/2/2012    Procedure: DISSECTION RADICAL NECK BILATERAL;;  Surgeon: Yung Alvares MD;  [facility]     ENDOSCOPIC RETROGRADE CHOLANGIOPANCREATOGRAM N/A 5/10/2016    Procedure: COMBINED ENDOSCOPIC RETROGRADE CHOLANGIOPANCREATOGRAPHY, PLACE TUBE/STENT;  Surgeon: Yovanny Beasley MD;  [facility]     ENDOSCOPIC RETROGRADE CHOLANGIOPANCREATOGRAM N/A 3/29/2018    Procedure: ENDOSCOPIC RETROGRADE CHOLANGIOPANCREATOGRAM;  Endoscopic Retrograde Cholangiopancreatogram, Endoscopic Ultrasound, Biliary Sphincterotomy, Biliary and Pancreatic Stent Placement;  Surgeon: Yovanny Beasley MD;  [facility]     ENDOSCOPIC ULTRASOUND UPPER GASTROINTESTINAL TRACT (GI) N/A 2/3/2016    Procedure: ENDOSCOPIC ULTRASOUND, ESOPHAGOSCOPY / UPPER GASTROINTESTINAL TRACT (GI);  Surgeon: Grabiel Plata MD;  [facility]     ENDOSCOPIC ULTRASOUND UPPER GASTROINTESTINAL TRACT (GI) N/A 3/29/2018    Procedure: ENDOSCOPIC ULTRASOUND, ESOPHAGOSCOPY / UPPER GASTROINTESTINAL TRACT (GI);;  Surgeon: Grabiel Plata MD;  [facility]     ESOPHAGOSCOPY, GASTROSCOPY, DUODENOSCOPY (EGD), COMBINED N/A 2/3/2016    Procedure: COMBINED ENDOSCOPIC ULTRASOUND, ESOPHAGOSCOPY, GASTROSCOPY, DUODENOSCOPY (EGD), FINE NEEDLE ASPIRATE/BIOPSY;  Surgeon: Grabiel Plata MD;  [facility]     ESOPHAGOSCOPY, GASTROSCOPY, DUODENOSCOPY (EGD), COMBINED N/A 6/8/2016    Procedure: COMBINED ESOPHAGOSCOPY, GASTROSCOPY, DUODENOSCOPY (EGD), REMOVE FOREIGN BODY;  Surgeon: Yovanny Beasley MD;  [facility]     ESOPHAGOSCOPY, GASTROSCOPY, DUODENOSCOPY (EGD), COMBINED N/A 4/17/2018    Procedure: COMBINED ESOPHAGOSCOPY, GASTROSCOPY, DUODENOSCOPY (EGD), REMOVE FOREIGN BODY;  EGD with stent removal;  Surgeon: Grabiel Plata MD;  [facility]     EXCISE LESION INTRAORAL  6/14/2012    Procedure: EXCISE LESION INTRAORAL;  Wide Local Excision Floor of Mouth,  Direct Laryngoscopy, Bilateral Jefferson's Marsuplization, Split Thickness Skin Graft from right Thigh  Latex Safe;  Surgeon: Gerson Ravi MD;  [facility]     EXCISE LESION INTRAORAL  8/2/2012    Procedure: EXCISE LESION INTRAORAL;  Floor of Mouth Resection, Bilateral Selective Radical Neck Dissection, Tracheostomy, Left Radial Forearm  Free Flap with Alloderm, Nasogastric Feeding Tube Placement,    * Latex Safe*;  Surgeon: Gerson Ravi MD;  [facility]     EXCISE LESION INTRAORAL  12/11/2012    Procedure: EXCISE LESION INTRAORAL;  takedown of oral flap;  Surgeon: Yung Alvares MD;  [facility]     GRAFT FREE VASCULARIZED (LOCATION)  8/2/2012    Procedure: GRAFT FREE VASCULARIZED (LOCATION);;  Surgeon: Yung Alvares MD;  [facility]     GRAFT SKIN SPLIT THICKNESS FROM EXTREMITY  6/14/2012    Procedure: GRAFT SKIN SPLIT THICKNESS FROM EXTREMITY;;  Surgeon: Gerson Ravi MD;  [facility]     LAPAROSCOPIC ILEOSTOMY TAKEDOWN  6/6/2013    Procedure: LAPAROSCOPIC ILEOSTOMY TAKEDOWN;  Laparoscopic Closure of Enterostomy, Guerda's Type with IleoRectal Anastomosis ;  Surgeon: Grabiel Riddle MD;  [facility]     LAPAROTOMY EXPLORATORY  12/20/2012    Procedure: LAPAROTOMY EXPLORATORY;  Exploratory Laparotomy, total abdominal colectomy, ileostomy formation;  Surgeon: Miquel Cannon MD;  [facility]     LARYNGOSCOPY  6/14/2012    Procedure: LARYNGOSCOPY;;  Surgeon: Gerson Ravi MD;  [facility]     ORTHOPEDIC SURGERY      ganglian cyst left ankle     PANCREATECTOMY, SPLENECTOMY N/A 3/10/2016    Procedure: PANCREATECTOMY, SPLENECTOMY;  Surgeon: Nael Abel MD;  [facility]     SHOULDER SURGERY  2006, 2008    2006- right rotator cuff, 2008 bone spur on left. Dr. Hdez     VARICOCELECTOMY Left 5/23/2017    Procedure: VARICOCELECTOMY;  Left Varicocele Repair, Denervation of Left Testis;  Surgeon: Marcio Aggarwal MD;  [facility]             Social History:   I  have reviewed this patient's social history   Social History   Substance Use Topics     Smoking status: Former Smoker     Packs/day: 1.00     Years: 40.00     Types: Cigarettes     Quit date: 2006     Smokeless tobacco: Never Used     Alcohol use 16.8 oz/week     28 Cans of beer per week             Family History:   I have reviewed this patient's family history   Family History   Problem Relation Age of Onset     Diabetes Sister      onset age 50     Alzheimer Disease Mother       80     Alzheimer Disease Father       85     Diabetes Other      nephew type 1     Diabetes Other      Anesthesia Reaction No family hx of      Colon Cancer No family hx of      Colon Polyps No family hx of      Crohn Disease No family hx of      Ulcerative Colitis No family hx of                 Allergies:     Allergies   Allergen Reactions     Nkda [No Known Drug Allergies]              Medications:     Prescriptions Prior to Admission   Medication Sig Dispense Refill Last Dose     ASPIRIN NOT PRESCRIBED (INTENTIONAL) Antiplatelet medication not prescribed intentionally due to Current anticoagulant therapy (warfarin/enoxaparin)   Taking     gabapentin (NEURONTIN) 300 MG capsule Take one in am, and 2 tablest (600 mg) every night 270 capsule 3 10/20/2018 at [facility]     lisinopril (PRINIVIL/ZESTRIL) 5 MG tablet TAKE 1 TABLET BY MOUTH ONCE DAILY 90 tablet 1 10/20/2018 at AM     metFORMIN (GLUCOPHAGE-XR) 500 MG 24 hr tablet TAKE TWO TABLETS BY MOUTH TWICE DAILY WITH MEALS 360 tablet 0 10/20/2018 at anali     metoprolol succinate (TOPROL-XL) 50 MG 24 hr tablet Take 1.5 tablets (75 mg) by mouth 2 times daily 270 tablet 3 10/20/2018 at HS     multivitamin, therapeutic with minerals (THERA-VIT-M) TABS Take 1 tablet by mouth daily. 30 each 1 10/20/2018 at AM     tamsulosin (FLOMAX) 0.4 MG capsule Take 1 capsule (0.4 mg) by mouth daily 90 capsule 3 10/20/2018 at AM     warfarin (COUMADIN) 2.5 MG tablet 2.5 mg daily or as directed by  "Anticoagulation Clinic 90 tablet 0 10/20/2018 at [facility]             Review of Systems:   Constitutional: negative  Eyes: negative  Ears, nose, mouth, throat, and face: negative  Respiratory: negative  Cardiovascular: negative  Gastrointestinal: positive for nausea and abdominal pain  Genitourinary:negative  Integument/breast: negative  Hematologic/lymphatic: negative  Musculoskeletal: negative   Neurological: negative  Behvioral/Psych: positive for excessive alcohol consumption  Endocrine: negative  Allergic/Immunologic: negative          Physical Exam:   Blood pressure 148/68, pulse 100, temperature 99.2  F (37.3  C), temperature source Oral, resp. rate 18, height 1.778 m (5' 10\"), weight 89.5 kg (197 lb 5 oz), SpO2 96 %.  Temperatures:  Current - Temp: 99.2  F (37.3  C); Max - Temp  Av.4  F (36.9  C)  Min: 97.5  F (36.4  C)  Max: 99.2  F (37.3  C)  Respiration range: Resp  Av  Min: 16  Max: 185  Pulse range: Pulse  Av  Min: 100  Max: 100  Blood pressure range: Systolic (24hrs), Av , Min:148 , Max:165   ; Diastolic (24hrs), Av, Min:68, Max:78    Pulse oximetry range: SpO2  Av.6 %  Min: 91 %  Max: 96 %  No intake or output data in the 24 hours ending 10/21/18 0711    Constitutional: Awake, alert, cooperative, no apparent distress, and appears stated age.  Eyes: Lids and lashes normal, pupils equal, round and reactive to light, extra ocular muscles intact, sclera clear, conjunctiva normal.  ENT: oral pharynx with moist mucus membranes, tonsils without erythema or exudates, post-surgical changes in floor of mouth, poor dentition  Neck: Supple, symmetrical, trachea midline, no adenopathy, thyroid symmetric, not enlarged and no tenderness, skin normal.  Hematologic / Lymphatic: No cervical lymphadenopathy and no supraclavicular lymphadenopathy.  Lungs: No increased work of breathing, good air exchange, clear to auscultation bilaterally, no crackles or wheezing.  Cardiovascular: Reg rate and " rhythm, normal S1 and S2, no S3 or S4, and no murmur noted.  Abdomen: normal bowel sounds, soft, non-distended, very mild epigastric and LUQ tenderness, no masses palpated,   Musculoskeletal: No redness, warmth, or swelling of the joints.  Full range of motion noted.  Motor strength is 5 out of 5 all extremities bilaterally.  Tone is normal.  Neurologic: Awake, alert, oriented to name, place and time.  Cranial nerves II-XII are grossly intact.  Motor is 5 out of 5 bilaterally.   Neuropsychiatric: Normal affect, mood, orientation, memory and insight.  Skin: No rashes, erythema, pallor, petechia or purpura.          Data:     Results for orders placed or performed during the hospital encounter of 10/21/18 (from the past 24 hour(s))   CBC with platelets differential   Result Value Ref Range    WBC 18.1 (H) 4.0 - 11.0 10e9/L    RBC Count 4.46 4.4 - 5.9 10e12/L    Hemoglobin 14.2 13.3 - 17.7 g/dL    Hematocrit 43.1 40.0 - 53.0 %    MCV 97 78 - 100 fl    MCH 31.8 26.5 - 33.0 pg    MCHC 32.9 31.5 - 36.5 g/dL    RDW 16.1 (H) 10.0 - 15.0 %    Platelet Count 200 150 - 450 10e9/L    Diff Method Automated Method     % Neutrophils 86.7 %    % Lymphocytes 6.2 %    % Monocytes 6.1 %    % Eosinophils 0.2 %    % Basophils 0.2 %    % Immature Granulocytes 0.6 %    Nucleated RBCs 0 0 /100    Absolute Neutrophil 15.7 (H) 1.6 - 8.3 10e9/L    Absolute Lymphocytes 1.1 0.8 - 5.3 10e9/L    Absolute Monocytes 1.1 0.0 - 1.3 10e9/L    Absolute Eosinophils 0.0 0.0 - 0.7 10e9/L    Absolute Basophils 0.0 0.0 - 0.2 10e9/L    Abs Immature Granulocytes 0.1 0 - 0.4 10e9/L    Absolute Nucleated RBC 0.0    Comprehensive metabolic panel   Result Value Ref Range    Sodium 137 133 - 144 mmol/L    Potassium 4.5 3.4 - 5.3 mmol/L    Chloride 102 94 - 109 mmol/L    Carbon Dioxide 26 20 - 32 mmol/L    Anion Gap 9 3 - 14 mmol/L    Glucose 196 (H) 70 - 99 mg/dL    Urea Nitrogen 17 7 - 30 mg/dL    Creatinine 0.86 0.66 - 1.25 mg/dL    GFR Estimate 88 >60  mL/min/1.7m2    GFR Estimate If Black >90 >60 mL/min/1.7m2    Calcium 8.8 8.5 - 10.1 mg/dL    Bilirubin Total 0.8 0.2 - 1.3 mg/dL    Albumin 3.5 3.4 - 5.0 g/dL    Protein Total 7.5 6.8 - 8.8 g/dL    Alkaline Phosphatase 41 40 - 150 U/L    ALT 26 0 - 70 U/L    AST 21 0 - 45 U/L   Lipase   Result Value Ref Range    Lipase 942 (H) 73 - 393 U/L   Troponin I   Result Value Ref Range    Troponin I ES <0.015 0.000 - 0.045 ug/L   INR   Result Value Ref Range    INR 2.51 (H) 0.86 - 1.14   Alcohol ethyl   Result Value Ref Range    Ethanol g/dL <0.01 <0.01 g/dL   CT Abdomen Pelvis w Contrast    Narrative    CT ABDOMEN/PELVIS WITH CONTRAST  10/21/2018 4:07 AM     HISTORY: Left upper quadrant abdominal pain, history of chronic  pancreatitis.     TECHNIQUE: Volumetric acquisition through abdomen and pelvis with  IV  contras,   93 mL [id number].  Radiation dose for this scan was reduced  using automated exposure control, adjustment of the mA and/or kV  according to patient size, or iterative reconstruction technique.    COMPARISON: 5/7/2018.    FINDINGS: Postoperative changes of distal pancreatectomy and  splenectomy. Mild fluid and stranding about the pancreas, most  prominent about the distal aspect of the pancreas near the level of  the surgical clips. The peripancreatic inflammation,  however, has  significantly improved compared to the prior study. Previously, there  were more prominent inflammatory changes about the pancreatic head,  which have resolved. No new fluid collections or pseudocysts.    There is still mild wall thickening along the medial aspect of the  adjacent stomach, though this has also improved. The stomach is mildly  distended. Liver and gallbladder are negative. Adrenal glands and  kidneys are stable. Bilateral renal cysts and a few small probable  renal cysts, too small to accurately characterize.    Pelvic structures within normal limits. Moderate vascular  calcification. No abdominal aortic aneurysm. No  bowel obstruction or  free air. Mild left lung base atelectasis. Mild degenerative and  hypertrophic changes in the visualized spine.      Impression    IMPRESSION:  1. Pancreatitis with probable mild acute superimposed on chronic  changes, most prominent near the remaining distal aspect. Distal  pancreatectomy and splenectomy. However, pancreatic inflammation has  significantly improved since the previous study. No new fluid  collections.  2. Mild adjacent gastric wall thickening, also improved.    FANG DIALLO MD     EKG results:   Performed on admission        Sinus, 1st degree AV block, mild tachycardia     CT scan of the abdomen:   Fat stranding around pancreas       Attestation:  I have reviewed today's vital signs, notes, medications, labs and imaging.  Amount of time performed on this history and physical: 30 minutes.      Dr. Citlalli Mejias, [facility]

## 2018-10-21 NOTE — ED NOTES
Patient's O2 sats dropping to 88% on RA, patient dozing off in room & recently given IV dilaudid, 2LNC placed & sats back to mid 90s

## 2018-10-21 NOTE — ED PROVIDER NOTES
History   No chief complaint on file.    HPI  Antoine Russo is a 72 year old male with past medical history which includes hypertension, CAD, GERD, C. difficile enterocolitis, history of colectomy, type 2 diabetes mellitus, paroxysmal atrial fibrillation, alcohol use and recurrent pancreatitis who presents complaining of 2 days of left upper abdominal pain.  Patient describes a dull achy left upper abdominal pain consistent with history of recurrent pancreatitis.  Symptoms began 2 days ago and have gradually increased in severity.  He denies chest pain, cough, shortness of breath, back pain, nausea/vomiting, diarrhea, lower abdominal pain, or genitourinary symptoms.  No known exacerbating or alleviating factors.  He does admit to drinking 3-4 beer daily.  Of note, patient sees [place] gastroenterologist Dr. Beasley for his chronic pancreatitis.    Problem List:    Patient Active Problem List    Diagnosis Date Noted     Anticipated difficulty with intubation 05/23/2017     Priority: High     Class: Chronic     Difficult two hands mask ventilation, intubated multiple times asleep with video laryngoscope. H/o tongue cancer surgery.        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 Please refer if Care Coordination services are needed.    Presenting Problem Signs and Symptoms Treatment Plan   Questions or concerns   during clinic hours   I will call my clinic directly:  [address], [place] [address]  [phone].   Questions or concerns outside clinic hours   I will call the 24 hour nurse line at   [phone] or [facility].   Need to schedule an appointment   I will call the 24 hour scheduling team at [phone] or my clinic directly at [phone].   Same day treatment     I will call my clinic first, nurse line if after hours, urgent care and express care if needed.   Clinic care coordination services (regular clinic hours)   I will call a clinic care coordinator directly:     Shelia Emanuel RN [facility]  [phone]    Brianna Cristobal :    [phone]    Or call my clinic at [phone] and ask to speak with care coordination.   Crisis Services: Behavioral or Mental Health  [facility] 24 Hour Phone Line  [phone]    [facility] 24 Hour Crisis Services  [phone]    [facility] (Behavioral Health Providers) Network [phone]    [facility]   [phone]     Emergency treatment -- Immediately    [facility] 911                Clostridium difficile enterocolitis 12/18/2012     Priority: Medium     Groin fluid collection 12/15/2012     Priority: Medium     CT 12/12- New (since 5/11) collection measuring at least 2.3 cm in diameter and 6.5 cm in length within the right inguinal canal. 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 Gerson Ravi and aristides at the  in 2012  NAD 2017  CT scan of the neck every 2-3 years.  - Thyroid labs yearly.  - Carotid ultrasound in three to four years to evaluate for stenosis.       Colon polyp 08/26/2011     Priority: Medium     Colonoscopy 8/2011-A sessile polyp was found in the cecum. The polyp was 6 mm in size. The polyp was removed with a hot snare. Resection and retrieval were complete. A sessile polyp was found in the proximal transverse colon. The polyp was 15 mm in size. The polyp was removed with a hot snare. Resection and retrieval were complete. A sessile polyp was found in the sigmoid colon. The polyp was 5 mm in size       Advanced directives, counseling/discussion 06/03/2011     Priority: Medium     Hyperlipidemia LDL goal <100 10/31/2010     Priority: Medium     CAD (coronary artery disease) 05/26/2009     Priority: Medium     Stress testing 2009 showed inferior wall ischemia.  Cath preformed; identified moderate CAD with stenosis of 40-50% in LAD and RCA.  No stents were placed.  Echo in 01/2018 (done while in Afib with rates of 100-110); EF of 55-60%.  No RWMA.       GERD (gastroesophageal reflux disease) 05/26/2009     Priority: Medium     Hypertrophy of breast 09/25/2007     Priority: Medium     Diverticulitis of colon 07/17/2007     Priority: Medium     Colitis on CT Scan 5/2011- [place]  Colonoscopy 8/2011 Diverticulitis - Multiple small and large-mouthed diverticula were found in the mid sigmoid colon and at the hepatic flexure. There was narrowing of the colon in association with the diverticular opening. Nena-diverticular erythema was seen. There was evidence of an impacted diverticulum. Purulent discharge was seen in association with the diverticlar opening. Biopsies were taken with a cold forceps for  histology. Multiple small and large-mouthed diverticula were found in the sigmoid colon, in the descending colon, in the transverse colon and in the ascending colon.   Hospitalized diverticulitis 12/12           PERS HX TOBACCO USE - quit in 11/06 with chantix 03/15/2007     Priority: Medium     Hypertension, benign essential, goal below 140/90 11/07/2005     Priority: Medium     Patient has only fair bp control and with family history of diabetes will all ace if lab indicated also has bph and may op for psa and not digital exam next yr        Pain in joint, shoulder region 11/07/2005     Priority: Medium     Hypertrophy of prostate without urinary obstruction 11/07/2005     Priority: Medium     Problem list name updated by automated process. 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 difficile colitis      Colon polyp      Coronary artery disease      Diabetes mellitus (H)      Diverticulitis      Hypertension      Malignant neoplasm (H)      Noninfectious ileitis      Recurrent pancreatitis (H)        Past Surgical History:    Past Surgical History:   Procedure Laterality Date     BREAST SURGERY  2008    right breast mass benign     COLONOSCOPY      multiple polyps removed     COLONOSCOPY  8/24/2011    Procedure:COMBINED COLONOSCOPY, REMOVE TUMOR/POLYP/LESION BY SNARE; Surgeon:MILEY ARBOLEDA; Location:[facility]     COLONOSCOPY  12/17/2012    Procedure: COLONOSCOPY;;  Surgeon: Leon Maurer MD;  Location:  GI     COLONOSCOPY  12/18/2012    Procedure: COLONOSCOPY;;  Surgeon: Leon Maurer MD;  Location:  GI     DENERVATION OF SPERMATIC CORD MICROSURGICAL Left 5/23/2017    Procedure: DENERVATION OF SPERMATIC CORD MICROSURGICAL;;  Surgeon: Marcio Aggarwal MD;  [facility]     DISSECTION RADICAL NECK BILATERAL  8/2/2012    Procedure: DISSECTION RADICAL NECK BILATERAL;;   Surgeon: Yung Alvares MD;  [facility]     ENDOSCOPIC RETROGRADE CHOLANGIOPANCREATOGRAM N/A 5/10/2016    Procedure: COMBINED ENDOSCOPIC RETROGRADE CHOLANGIOPANCREATOGRAPHY, PLACE TUBE/STENT;  Surgeon: Yovanny Beasley MD;  [facility]     ENDOSCOPIC RETROGRADE CHOLANGIOPANCREATOGRAM N/A 3/29/2018    Procedure: ENDOSCOPIC RETROGRADE CHOLANGIOPANCREATOGRAM;  Endoscopic Retrograde Cholangiopancreatogram, Endoscopic Ultrasound, Biliary Sphincterotomy, Biliary and Pancreatic Stent Placement;  Surgeon: Yovanny Beasley MD;  [facility]     ENDOSCOPIC ULTRASOUND UPPER GASTROINTESTINAL TRACT (GI) N/A 2/3/2016    Procedure: ENDOSCOPIC ULTRASOUND, ESOPHAGOSCOPY / UPPER GASTROINTESTINAL TRACT (GI);  Surgeon: Grabiel Plata MD;  [facility]     ENDOSCOPIC ULTRASOUND UPPER GASTROINTESTINAL TRACT (GI) N/A 3/29/2018    Procedure: ENDOSCOPIC ULTRASOUND, ESOPHAGOSCOPY / UPPER GASTROINTESTINAL TRACT (GI);;  Surgeon: Grabiel Plata MD;  [facility]     ESOPHAGOSCOPY, GASTROSCOPY, DUODENOSCOPY (EGD), COMBINED N/A 2/3/2016    Procedure: COMBINED ENDOSCOPIC ULTRASOUND, ESOPHAGOSCOPY, GASTROSCOPY, DUODENOSCOPY (EGD), FINE NEEDLE ASPIRATE/BIOPSY;  Surgeon: Grabiel Plata MD;  [facility]     ESOPHAGOSCOPY, GASTROSCOPY, DUODENOSCOPY (EGD), COMBINED N/A 6/8/2016    Procedure: COMBINED ESOPHAGOSCOPY, GASTROSCOPY, DUODENOSCOPY (EGD), REMOVE FOREIGN BODY;  Surgeon: Yovanny Beasley MD;  [facility]     ESOPHAGOSCOPY, GASTROSCOPY, DUODENOSCOPY (EGD), COMBINED N/A 4/17/2018    Procedure: COMBINED ESOPHAGOSCOPY, GASTROSCOPY, DUODENOSCOPY (EGD), REMOVE FOREIGN BODY;  EGD with stent removal;  Surgeon: Grabiel Plata MD;  [facility]     EXCISE LESION INTRAORAL  6/14/2012    Procedure: EXCISE LESION INTRAORAL;  Wide Local Excision Floor of Mouth, Direct Laryngoscopy, Bilateral Maui's Marsuplization, Split Thickness Skin Graft from right Thigh  Latex Safe;   Surgeon: Gerson Ravi MD;  [facility]     EXCISE LESION INTRAORAL  2012    Procedure: EXCISE LESION INTRAORAL;  Floor of Mouth Resection, Bilateral Selective Radical Neck Dissection, Tracheostomy, Left Radial Forearm  Free Flap with Alloderm, Nasogastric Feeding Tube Placement,    * Latex Safe*;  Surgeon: Gerson Ravi MD;  [facility]     EXCISE LESION INTRAORAL  2012    Procedure: EXCISE LESION INTRAORAL;  takedown of oral flap;  Surgeon: Yung Alvares MD;  [facility]     GRAFT FREE VASCULARIZED (LOCATION)  2012    Procedure: GRAFT FREE VASCULARIZED (LOCATION);;  Surgeon: Yung Alvares MD;  [facility]     GRAFT SKIN SPLIT THICKNESS FROM EXTREMITY  2012    Procedure: GRAFT SKIN SPLIT THICKNESS FROM EXTREMITY;;  Surgeon: Gerson Ravi MD;  [facility]     LAPAROSCOPIC ILEOSTOMY TAKEDOWN  2013    Procedure: LAPAROSCOPIC ILEOSTOMY TAKEDOWN;  Laparoscopic Closure of Enterostomy, Guerda's Type with IleoRectal Anastomosis ;  Surgeon: Grabiel Riddle MD;  [facility]     LAPAROTOMY EXPLORATORY  2012    Procedure: LAPAROTOMY EXPLORATORY;  Exploratory Laparotomy, total abdominal colectomy, ileostomy formation;  Surgeon: Miquel Cannon MD;  [facility]     LARYNGOSCOPY  2012    Procedure: LARYNGOSCOPY;;  Surgeon: Gerson Ravi MD;  [facility]     ORTHOPEDIC SURGERY      ganglian cyst left ankle     PANCREATECTOMY, SPLENECTOMY N/A 3/10/2016    Procedure: PANCREATECTOMY, SPLENECTOMY;  Surgeon: Nael Abel MD;  [facility]     SHOULDER SURGERY  ,     2006- right rotator cuff, 2008 bone spur on left. Dr. Hdez     VARICOCELECTOMY Left 2017    Procedure: VARICOCELECTOMY;  Left Varicocele Repair, Denervation of Left Testis;  Surgeon: Marcio Aggarwal MD;  [facility]       Family History:    Family History   Problem Relation Age of Onset     Diabetes Sister      onset age 50     Alzheimer Disease Mother        "80     Alzheimer Disease Father       85     Diabetes Other      nephew type 1     Diabetes Other      Anesthesia Reaction No family hx of      Colon Cancer No family hx of      Colon Polyps No family hx of      Crohn Disease No family hx of      Ulcerative Colitis No family hx of        Social History:  Marital Status:   [2]  Social History   Substance Use Topics     Smoking status: Former Smoker     Packs/day: 1.00     Years: 40.00     Types: Cigarettes     Quit date: 2006     Smokeless tobacco: Never Used     Alcohol use 12.6 oz/week     21 Cans of beer per week        Medications:      amylase-lipase-protease (CREON) 24249 units CPEP   ASPIRIN NOT PRESCRIBED (INTENTIONAL)   gabapentin (NEURONTIN) 300 MG capsule   lisinopril (PRINIVIL/ZESTRIL) 5 MG tablet   metFORMIN (GLUCOPHAGE-XR) 500 MG 24 hr tablet   metoprolol succinate (TOPROL-XL) 50 MG 24 hr tablet   multivitamin, therapeutic with minerals (THERA-VIT-M) TABS   tamsulosin (FLOMAX) 0.4 MG capsule   warfarin (COUMADIN) 2.5 MG tablet         Review of Systems  Constitutional:  Negative for fever or recent illness.  Cardiovascular:  Negative for chest pain.  Respiratory:  Negative for cough or shortness of breath.  Gastrointestinal: Positive for achy LUQ abdominal pain.  Negative for nausea, vomiting, diarrhea, or blood with bowel movements..  Genitourinary:  Negative for dysuria.  Musculoskeletal: Negative for back pain.    All others reviewed and are negative.      Physical Exam   BP: 165/75  Pulse: 100  Temp: 97.5  F (36.4  C)  Resp: (!) 185  Height: 177.8 cm (5' 10\")  Weight: 86.2 kg (190 lb)  SpO2: 91 %      Physical Exam  Constitutional:  Well developed, well nourished.  Appears nontoxic but moderately uncomfortable resting on the gurney.    HENT:  Normocephalic and atraumatic.  Symmetric in appearance.  Eyes:  Conjunctivae are normal.  Neck:  Neck supple.  Cardiovascular:  No cyanosis.  RRR.  No audible murmurs noted.    Respiratory:  " Effort normal without sign of respiratory distress.  CTAB without diminished regions.  No wheezing, rhonchi, or crackles.  Gastrointestinal:  Soft nondistended abdomen.  LUQ tenderness with guarding.  No rigidity or rebound tenderness.  Negative Castrejon's sign.  Negative McBurney's point.    Genitourinary:  Noncontributory.   Musculoskeletal:  Moves extremities spontaneously.  Neurological:  Patient is alert.  Skin:  Skin is warm and dry.  Psychiatric:  Normal mood and affect.      ED Course     ED Course     Procedures                 EKG Interpretation:      Interpreted by: Stas Bryan  Time reviewed: Upon arrival     Symptoms at time of EKG: Abdominal pain  Rhythm: Sinus  Rate: Normal  Axis: Left  Conduction: First-degree AV block  ST Segments/ T Waves: No pathologic ST-elevations or T-wave abnormalities.  Q Waves: None  Comparison to prior: Similar morphology to previous     Clinical Impression: No sign of ischemia         Critical Care time:  none               Results for orders placed or performed during the hospital encounter of 10/21/18 (from the past 24 hour(s))   CBC with platelets differential   Result Value Ref Range    WBC 18.1 (H) 4.0 - 11.0 10e9/L    RBC Count 4.46 4.4 - 5.9 10e12/L    Hemoglobin 14.2 13.3 - 17.7 g/dL    Hematocrit 43.1 40.0 - 53.0 %    MCV 97 78 - 100 fl    MCH 31.8 26.5 - 33.0 pg    MCHC 32.9 31.5 - 36.5 g/dL    RDW 16.1 (H) 10.0 - 15.0 %    Platelet Count 200 150 - 450 10e9/L    Diff Method Automated Method     % Neutrophils 86.7 %    % Lymphocytes 6.2 %    % Monocytes 6.1 %    % Eosinophils 0.2 %    % Basophils 0.2 %    % Immature Granulocytes 0.6 %    Nucleated RBCs 0 0 /100    Absolute Neutrophil 15.7 (H) 1.6 - 8.3 10e9/L    Absolute Lymphocytes 1.1 0.8 - 5.3 10e9/L    Absolute Monocytes 1.1 0.0 - 1.3 10e9/L    Absolute Eosinophils 0.0 0.0 - 0.7 10e9/L    Absolute Basophils 0.0 0.0 - 0.2 10e9/L    Abs Immature Granulocytes 0.1 0 - 0.4 10e9/L    Absolute Nucleated RBC 0.0     Comprehensive metabolic panel   Result Value Ref Range    Sodium 137 133 - 144 mmol/L    Potassium 4.5 3.4 - 5.3 mmol/L    Chloride 102 94 - 109 mmol/L    Carbon Dioxide 26 20 - 32 mmol/L    Anion Gap 9 3 - 14 mmol/L    Glucose 196 (H) 70 - 99 mg/dL    Urea Nitrogen 17 7 - 30 mg/dL    Creatinine 0.86 0.66 - 1.25 mg/dL    GFR Estimate 88 >60 mL/min/1.7m2    GFR Estimate If Black >90 >60 mL/min/1.7m2    Calcium 8.8 8.5 - 10.1 mg/dL    Bilirubin Total 0.8 0.2 - 1.3 mg/dL    Albumin 3.5 3.4 - 5.0 g/dL    Protein Total 7.5 6.8 - 8.8 g/dL    Alkaline Phosphatase 41 40 - 150 U/L    ALT 26 0 - 70 U/L    AST 21 0 - 45 U/L   Lipase   Result Value Ref Range    Lipase 942 (H) 73 - 393 U/L   Troponin I   Result Value Ref Range    Troponin I ES <0.015 0.000 - 0.045 ug/L   INR   Result Value Ref Range    INR 2.51 (H) 0.86 - 1.14   Alcohol ethyl   Result Value Ref Range    Ethanol g/dL <0.01 <0.01 g/dL   CT Abdomen Pelvis w Contrast    Narrative    CT ABDOMEN/PELVIS WITH CONTRAST  10/21/2018 4:07 AM     HISTORY: Left upper quadrant abdominal pain, history of chronic  pancreatitis.     TECHNIQUE: Volumetric acquisition through abdomen and pelvis with  IV  contras,   93 mL Isovue-370.  Radiation dose for this scan was reduced  using automated exposure control, adjustment of the mA and/or kV  according to patient size, or iterative reconstruction technique.    COMPARISON: 5/7/2018.    FINDINGS: Postoperative changes of distal pancreatectomy and  splenectomy. Mild fluid and stranding about the pancreas, most  prominent about the distal aspect of the pancreas near the level of  the surgical clips. The peripancreatic inflammation,  however, has  significantly improved compared to the prior study. Previously, there  were more prominent inflammatory changes about the pancreatic head,  which have resolved. No new fluid collections or pseudocysts.    There is still mild wall thickening along the medial aspect of the  adjacent stomach,  though this has also improved. The stomach is mildly  distended. Liver and gallbladder are negative. Adrenal glands and  kidneys are stable. Bilateral renal cysts and a few small probable  renal cysts, too small to accurately characterize.    Pelvic structures within normal limits. Moderate vascular  calcification. No abdominal aortic aneurysm. No bowel obstruction or  free air. Mild left lung base atelectasis. Mild degenerative and  hypertrophic changes in the visualized spine.      Impression    IMPRESSION:  1. Pancreatitis with probable mild acute superimposed on chronic  changes, most prominent near the remaining distal aspect. Distal  pancreatectomy and splenectomy. However, pancreatic inflammation has  significantly improved since the previous study. No new fluid  collections.  2. Mild adjacent gastric wall thickening, also improved.    FANG DIALLO MD       Medications   HYDROmorphone (DILAUDID) injection 1 mg (1 mg Intravenous Given 10/21/18 0418)   lactated ringers BOLUS 1,000 mL (1,000 mLs Intravenous New Bag 10/21/18 0417)   iopamidol (ISOVUE-370) solution 93 mL (93 mLs Intravenous Given 10/21/18 0400)   sodium chloride 0.9 % bag 500mL for CT scan flush use (63 mLs Intravenous Given 10/21/18 0400)       Assessments & Plan (with Medical Decision Making)   Antione Russo is a 72 year old male who presented to the department complaining of left upper quadrant abdominal pain similar to history of pancreatitis.  In reviewing records, patient had a complex history of IPMN with distal pancreatectomy and ductal stenting since removed.  He has not had a bout of pancreatitis in nearly 1 year but admits he has been using alcohol again.  His CBC reveals moderate leukocytosis but afebrile and without other infectious symptoms.  Lipase elevated at near 1000.  INR therapeutic.  CT scan of abdomen/pelvis independently reviewed and agree with radiologist read of pancreatitis, however radiologist notes no  identifiable comp occasions.  Patient will require admission for IV fluids and management of acute pancreatitis.  Dr. Finch has accepted ongoing care for the patient at this time.  No further recommendations.  Temporary transition orders were placed per hospital protocol to prevent any potential delay in patient care.    Patient has been informed of results and the recommendation for admission.  They have verbalized an understanding, all questions answered, and in agreement with the plan.      Disclaimer:  This note consists of symbols derived from keyboarding, dictation, and/or voice recognition software.  As a result, there may be errors in the script that have gone undetected.  Please consider this when interpreting information found in the chart.        I have reviewed the nursing notes.    I have reviewed the findings, diagnosis, plan and need for follow up with the patient.       New Prescriptions    No medications on file       Final diagnoses:   Acute pancreatitis, unspecified complication status, unspecified pancreatitis type   LUQ abdominal pain       10/21/2018   [facility] EMERGENCY DEPARTMENT     Stas Bryan DO  10/21/18 0528

## 2018-10-22 LAB
ALBUMIN SERPL-MCNC: 2.9 G/DL (ref 3.4–5)
ALP SERPL-CCNC: 37 U/L (ref 40–150)
ALT SERPL W P-5'-P-CCNC: 18 U/L (ref 0–70)
ANION GAP SERPL CALCULATED.3IONS-SCNC: 6 MMOL/L (ref 3–14)
AST SERPL W P-5'-P-CCNC: 15 U/L (ref 0–45)
BILIRUB SERPL-MCNC: 1.8 MG/DL (ref 0.2–1.3)
BUN SERPL-MCNC: 10 MG/DL (ref 7–30)
CALCIUM SERPL-MCNC: 8.5 MG/DL (ref 8.5–10.1)
CHLORIDE SERPL-SCNC: 102 MMOL/L (ref 94–109)
CHOLEST SERPL-MCNC: 163 MG/DL
CO2 SERPL-SCNC: 29 MMOL/L (ref 20–32)
CREAT SERPL-MCNC: 0.91 MG/DL (ref 0.66–1.25)
ERYTHROCYTE [DISTWIDTH] IN BLOOD BY AUTOMATED COUNT: 16.2 % (ref 10–15)
GFR SERPL CREATININE-BSD FRML MDRD: 82 ML/MIN/1.7M2
GLUCOSE BLDC GLUCOMTR-MCNC: 126 MG/DL (ref 70–99)
GLUCOSE BLDC GLUCOMTR-MCNC: 130 MG/DL (ref 70–99)
GLUCOSE BLDC GLUCOMTR-MCNC: 134 MG/DL (ref 70–99)
GLUCOSE BLDC GLUCOMTR-MCNC: 142 MG/DL (ref 70–99)
GLUCOSE SERPL-MCNC: 136 MG/DL (ref 70–99)
HBA1C MFR BLD: 6.9 % (ref 0–5.6)
HCT VFR BLD AUTO: 43.7 % (ref 40–53)
HDLC SERPL-MCNC: 43 MG/DL
HGB BLD-MCNC: 14.2 G/DL (ref 13.3–17.7)
INR PPP: 2.16 (ref 0.86–1.14)
LDLC SERPL CALC-MCNC: 90 MG/DL
MCH RBC QN AUTO: 31.9 PG (ref 26.5–33)
MCHC RBC AUTO-ENTMCNC: 32.5 G/DL (ref 31.5–36.5)
MCV RBC AUTO: 98 FL (ref 78–100)
NONHDLC SERPL-MCNC: 120 MG/DL
PLATELET # BLD AUTO: 179 10E9/L (ref 150–450)
POTASSIUM SERPL-SCNC: 4.4 MMOL/L (ref 3.4–5.3)
PROT SERPL-MCNC: 6.8 G/DL (ref 6.8–8.8)
RBC # BLD AUTO: 4.45 10E12/L (ref 4.4–5.9)
SODIUM SERPL-SCNC: 137 MMOL/L (ref 133–144)
TRIGL SERPL-MCNC: 148 MG/DL
WBC # BLD AUTO: 16.1 10E9/L (ref 4–11)

## 2018-10-22 PROCEDURE — 25000132 ZZH RX MED GY IP 250 OP 250 PS 637: Performed by: INTERNAL MEDICINE

## 2018-10-22 PROCEDURE — 12000000 ZZH R&B MED SURG/OB

## 2018-10-22 PROCEDURE — 99232 SBSQ HOSP IP/OBS MODERATE 35: CPT | Performed by: FAMILY MEDICINE

## 2018-10-22 PROCEDURE — 25000132 ZZH RX MED GY IP 250 OP 250 PS 637: Performed by: FAMILY MEDICINE

## 2018-10-22 PROCEDURE — 25000128 H RX IP 250 OP 636: Performed by: STUDENT IN AN ORGANIZED HEALTH CARE EDUCATION/TRAINING PROGRAM

## 2018-10-22 PROCEDURE — 36415 COLL VENOUS BLD VENIPUNCTURE: CPT | Performed by: INTERNAL MEDICINE

## 2018-10-22 PROCEDURE — 80053 COMPREHEN METABOLIC PANEL: CPT | Performed by: INTERNAL MEDICINE

## 2018-10-22 PROCEDURE — 85027 COMPLETE CBC AUTOMATED: CPT | Performed by: INTERNAL MEDICINE

## 2018-10-22 PROCEDURE — 83036 HEMOGLOBIN GLYCOSYLATED A1C: CPT | Performed by: FAMILY MEDICINE

## 2018-10-22 PROCEDURE — 36415 COLL VENOUS BLD VENIPUNCTURE: CPT | Performed by: FAMILY MEDICINE

## 2018-10-22 PROCEDURE — 80061 LIPID PANEL: CPT | Performed by: FAMILY MEDICINE

## 2018-10-22 PROCEDURE — 00000146 ZZHCL STATISTIC GLUCOSE BY METER IP

## 2018-10-22 PROCEDURE — 85610 PROTHROMBIN TIME: CPT | Performed by: INTERNAL MEDICINE

## 2018-10-22 RX ORDER — DEXTROSE MONOHYDRATE 25 G/50ML
25-50 INJECTION, SOLUTION INTRAVENOUS
Status: DISCONTINUED | OUTPATIENT
Start: 2018-10-22 | End: 2018-10-22

## 2018-10-22 RX ORDER — NICOTINE POLACRILEX 4 MG
15-30 LOZENGE BUCCAL
Status: DISCONTINUED | OUTPATIENT
Start: 2018-10-22 | End: 2018-10-22

## 2018-10-22 RX ORDER — HYDROCODONE BITARTRATE AND ACETAMINOPHEN 5; 325 MG/1; MG/1
1-2 TABLET ORAL EVERY 6 HOURS PRN
Status: DISCONTINUED | OUTPATIENT
Start: 2018-10-22 | End: 2018-10-23 | Stop reason: HOSPADM

## 2018-10-22 RX ORDER — NICOTINE POLACRILEX 4 MG
15-30 LOZENGE BUCCAL
Status: DISCONTINUED | OUTPATIENT
Start: 2018-10-22 | End: 2018-10-23 | Stop reason: HOSPADM

## 2018-10-22 RX ORDER — DEXTROSE MONOHYDRATE 25 G/50ML
25-50 INJECTION, SOLUTION INTRAVENOUS
Status: DISCONTINUED | OUTPATIENT
Start: 2018-10-22 | End: 2018-10-23 | Stop reason: HOSPADM

## 2018-10-22 RX ORDER — WARFARIN SODIUM 2.5 MG/1
2.5 TABLET ORAL
Status: COMPLETED | OUTPATIENT
Start: 2018-10-22 | End: 2018-10-22

## 2018-10-22 RX ADMIN — HYDROCODONE BITARTRATE AND ACETAMINOPHEN 1 TABLET: 5; 325 TABLET ORAL at 13:21

## 2018-10-22 RX ADMIN — WARFARIN SODIUM 2.5 MG: 2.5 TABLET ORAL at 17:58

## 2018-10-22 RX ADMIN — SODIUM CHLORIDE, POTASSIUM CHLORIDE, SODIUM LACTATE AND CALCIUM CHLORIDE: 600; 310; 30; 20 INJECTION, SOLUTION INTRAVENOUS at 21:35

## 2018-10-22 RX ADMIN — SODIUM CHLORIDE, POTASSIUM CHLORIDE, SODIUM LACTATE AND CALCIUM CHLORIDE: 600; 310; 30; 20 INJECTION, SOLUTION INTRAVENOUS at 13:56

## 2018-10-22 RX ADMIN — HYDROCODONE BITARTRATE AND ACETAMINOPHEN 1 TABLET: 5; 325 TABLET ORAL at 20:33

## 2018-10-22 RX ADMIN — SODIUM CHLORIDE, POTASSIUM CHLORIDE, SODIUM LACTATE AND CALCIUM CHLORIDE: 600; 310; 30; 20 INJECTION, SOLUTION INTRAVENOUS at 05:47

## 2018-10-22 RX ADMIN — INSULIN ASPART 1 UNITS: 100 INJECTION, SOLUTION INTRAVENOUS; SUBCUTANEOUS at 08:41

## 2018-10-22 RX ADMIN — LISINOPRIL 5 MG: 5 TABLET ORAL at 08:44

## 2018-10-22 RX ADMIN — HYDROCODONE BITARTRATE AND ACETAMINOPHEN 1 TABLET: 5; 325 TABLET ORAL at 08:46

## 2018-10-22 RX ADMIN — METOPROLOL SUCCINATE 75 MG: 50 TABLET, EXTENDED RELEASE ORAL at 20:33

## 2018-10-22 RX ADMIN — METOPROLOL SUCCINATE 75 MG: 50 TABLET, EXTENDED RELEASE ORAL at 08:45

## 2018-10-22 RX ADMIN — GABAPENTIN 600 MG: 600 TABLET, FILM COATED ORAL at 22:01

## 2018-10-22 RX ADMIN — MULTIPLE VITAMINS W/ MINERALS TAB 1 TABLET: TAB at 08:44

## 2018-10-22 RX ADMIN — GABAPENTIN 300 MG: 300 CAPSULE ORAL at 08:44

## 2018-10-22 RX ADMIN — TAMSULOSIN HYDROCHLORIDE 0.4 MG: 0.4 CAPSULE ORAL at 08:44

## 2018-10-22 ASSESSMENT — ACTIVITIES OF DAILY LIVING (ADL)
ADLS_ACUITY_SCORE: 11

## 2018-10-22 NOTE — PROGRESS NOTES
[facility]ist Progress Note           Assessment and Plan:     This patient is a 72 year old male with a past medical history of chronic pancreatitis due to IPMN and alcohol, paroxysmal A. fib, type 2 diabetes, hypertension history of colitis status post partial colectomy, history of head and neck cancer who presents with 2 days of left upper abdominal and epigastric pain and nausea, and found to have acute recurrent pancreatitis.     Acute on chronic Pancreatitis:   10/21/18 -- due to EtOH.  He has a history of a distal pancreatectomy for IPMN of the tail in the setting of chronic pancreatitis.  He has history of necrotizing pancreatitis, pancreatic duct leak, and a history of a stent earlier in 2018, since removed.  He was abstinent from EtOH from Jan-July, then started using 3-4 beers/day starting in July.  He also stopped his pancreatic enzymes in June due to cost.    --NPO, IVF /hr  --Dilaudid and norco prn pain   10/22/2018 -- improving.  Continue norco prn, starting clears.  Checking triglycerides.       Diabetes-2 with Peripheral Neuropathy:   10/21/18 -- A1c 7.1 in Aug.  On metformin, doesn't check blood sugar  --BID blood sugar check, low dose sliding scale novolog while NPO.  Consider more frequent check is hyper/hypo -glycemia  --Hold metformin  10/22/2018 -- with starting orals will start on low dose sliding scale insulin.         parox A-fib:   10/21/18 -- chronic and stable, continue prior to admission warfarin  10/22/2018 -- no change.      Hypertension:   10/21/18 -- chronic and stable.  Blood pressure elevated since admission, will monitor closely  --continue prior to admission lisinopril and metoprolol  10/22/2018 -- doing well     BPH:    Monitor for urine retention. continue flomax     Non-Obstructive CAD:    10/21/18 -- Seen on angiogram 2009, no stents or history of MI.  Not on statin for unclear reasons.  Not on ASA, on warfarin  10/22/2018 -- stable.      Prophylaxis  On  coumadin      Lines  PIV    Disposition  Improving, hope for discharge in 1-2 days if pain continues to improve and tolerating orals.             Interval History:   No new concerns.  Pain much improved, from 9/10 on admission to 2-3 today.  Adequately controlled with norco.  No nausea, fever or chills.  No dyspnea.  Feels hungry.  No other changes/new concerns.    No other pain             Review of Systems:    ROS: 10 point ROS neg other than the symptoms noted above in the HPI.             Medications:   Current active medications and PTA medications reviewed, see medication list for details.            Physical Exam:   Vitals were reviewed  Patient Vitals for the past 24 hrs:   BP Temp Temp src Pulse Resp SpO2   10/22/18 1545 126/57 98.6  F (37  C) Oral 79 18 93 %   10/22/18 1114 111/45 98  F (36.7  C) Oral 79 20 91 %   10/22/18 0731 142/58 99.8  F (37.7  C) Oral 89 20 93 %   10/21/18 2333 135/60 98.5  F (36.9  C) Oral 90 20 92 %   10/21/18 1934 142/60 99.4  F (37.4  C) Oral 80 20 92 %       Temperatures:  Current - Temp: 98.6  F (37  C); Max - Temp  Av.9  F (37.2  C)  Min: 98  F (36.7  C)  Max: 99.8  F (37.7  C)  Respiration range: Resp  Av.6  Min: 18  Max: 20  Pulse range: Pulse  Av.4  Min: 79  Max: 90  Blood pressure range: Systolic (24hrs), Av , Min:111 , Max:142   ; Diastolic (24hrs), Av, Min:45, Max:60    Pulse oximetry range: SpO2  Av.2 %  Min: 91 %  Max: 93 %  I/O last 3 completed shifts:  In: 2931.75 [I.V.:2931.75]  Out: 1000 [Urine:1000]    Intake/Output Summary (Last 24 hours) at 10/22/18 1628  Last data filed at 10/22/18 1356   Gross per 24 hour   Intake          2931.75 ml   Output             1000 ml   Net          1931.75 ml     EXAM:  General: awake and alert, NAD, oriented x 3  Head: normocephalic  Neck: unremarkable, no lymphadenopathy   HEENT: oropharynx pink and moist    Heart: Regular rate and rhythm, no murmurs, rubs, or gallops  Lungs: clear to auscultation  bilaterally with good air movement throughout  Abdomen: soft, mild tenderness upper mid abdomen, no masses or organomegaly, normal bowel sounds, non-distended  Extremities: no edema in lower extremities   Skin unremarkable.               Data:     Results for orders placed or performed during the hospital encounter of 10/21/18 (from the past 24 hour(s))   Glucose by meter   Result Value Ref Range    Glucose 131 (H) 70 - 99 mg/dL   INR   Result Value Ref Range    INR 2.16 (H) 0.86 - 1.14   Comprehensive metabolic panel   Result Value Ref Range    Sodium 137 133 - 144 mmol/L    Potassium 4.4 3.4 - 5.3 mmol/L    Chloride 102 94 - 109 mmol/L    Carbon Dioxide 29 20 - 32 mmol/L    Anion Gap 6 3 - 14 mmol/L    Glucose 136 (H) 70 - 99 mg/dL    Urea Nitrogen 10 7 - 30 mg/dL    Creatinine 0.91 0.66 - 1.25 mg/dL    GFR Estimate 82 >60 mL/min/1.7m2    GFR Estimate If Black >90 >60 mL/min/1.7m2    Calcium 8.5 8.5 - 10.1 mg/dL    Bilirubin Total 1.8 (H) 0.2 - 1.3 mg/dL    Albumin 2.9 (L) 3.4 - 5.0 g/dL    Protein Total 6.8 6.8 - 8.8 g/dL    Alkaline Phosphatase 37 (L) 40 - 150 U/L    ALT 18 0 - 70 U/L    AST 15 0 - 45 U/L   CBC with platelets   Result Value Ref Range    WBC 16.1 (H) 4.0 - 11.0 10e9/L    RBC Count 4.45 4.4 - 5.9 10e12/L    Hemoglobin 14.2 13.3 - 17.7 g/dL    Hematocrit 43.7 40.0 - 53.0 %    MCV 98 78 - 100 fl    MCH 31.9 26.5 - 33.0 pg    MCHC 32.5 31.5 - 36.5 g/dL    RDW 16.2 (H) 10.0 - 15.0 %    Platelet Count 179 150 - 450 10e9/L   Glucose by meter   Result Value Ref Range    Glucose 142 (H) 70 - 99 mg/dL   Glucose by meter   Result Value Ref Range    Glucose 130 (H) 70 - 99 mg/dL           Attestation:  I have reviewed today's vital signs, notes, medications, labs and imaging.  Amount of time performed on this daily note: 30 minutes.     Willam Nevarez MD, MD

## 2018-10-23 ENCOUNTER — ANTICOAGULATION THERAPY VISIT (OUTPATIENT)
Dept: ANTICOAGULATION | Facility: CLINIC | Age: 73
End: 2018-10-23

## 2018-10-23 VITALS
BODY MASS INDEX: 28.56 KG/M2 | WEIGHT: 199.52 LBS | SYSTOLIC BLOOD PRESSURE: 128 MMHG | DIASTOLIC BLOOD PRESSURE: 56 MMHG | RESPIRATION RATE: 18 BRPM | HEART RATE: 81 BPM | TEMPERATURE: 98.1 F | OXYGEN SATURATION: 91 % | HEIGHT: 70 IN

## 2018-10-23 DIAGNOSIS — I48.20 CHRONIC ATRIAL FIBRILLATION (H): ICD-10-CM

## 2018-10-23 LAB
ALBUMIN SERPL-MCNC: 2.4 G/DL (ref 3.4–5)
ALP SERPL-CCNC: 37 U/L (ref 40–150)
ALT SERPL W P-5'-P-CCNC: 17 U/L (ref 0–70)
ANION GAP SERPL CALCULATED.3IONS-SCNC: 7 MMOL/L (ref 3–14)
AST SERPL W P-5'-P-CCNC: 13 U/L (ref 0–45)
BILIRUB SERPL-MCNC: 2.4 MG/DL (ref 0.2–1.3)
BUN SERPL-MCNC: 10 MG/DL (ref 7–30)
CALCIUM SERPL-MCNC: 8.1 MG/DL (ref 8.5–10.1)
CHLORIDE SERPL-SCNC: 103 MMOL/L (ref 94–109)
CO2 SERPL-SCNC: 27 MMOL/L (ref 20–32)
CREAT SERPL-MCNC: 0.88 MG/DL (ref 0.66–1.25)
ERYTHROCYTE [DISTWIDTH] IN BLOOD BY AUTOMATED COUNT: 15.7 % (ref 10–15)
GFR SERPL CREATININE-BSD FRML MDRD: 86 ML/MIN/1.7M2
GLUCOSE BLDC GLUCOMTR-MCNC: 104 MG/DL (ref 70–99)
GLUCOSE BLDC GLUCOMTR-MCNC: 169 MG/DL (ref 70–99)
GLUCOSE SERPL-MCNC: 122 MG/DL (ref 70–99)
HCT VFR BLD AUTO: 36.3 % (ref 40–53)
HGB BLD-MCNC: 11.9 G/DL (ref 13.3–17.7)
HGB BLD-MCNC: 12.8 G/DL (ref 13.3–17.7)
INR PPP: 1.84 (ref 0.86–1.14)
LIPASE SERPL-CCNC: 565 U/L (ref 73–393)
MCH RBC QN AUTO: 32.1 PG (ref 26.5–33)
MCHC RBC AUTO-ENTMCNC: 32.8 G/DL (ref 31.5–36.5)
MCV RBC AUTO: 98 FL (ref 78–100)
PLATELET # BLD AUTO: 156 10E9/L (ref 150–450)
POTASSIUM SERPL-SCNC: 4 MMOL/L (ref 3.4–5.3)
PROT SERPL-MCNC: 6.2 G/DL (ref 6.8–8.8)
RBC # BLD AUTO: 3.71 10E12/L (ref 4.4–5.9)
SODIUM SERPL-SCNC: 137 MMOL/L (ref 133–144)
WBC # BLD AUTO: 15.5 10E9/L (ref 4–11)

## 2018-10-23 PROCEDURE — 25000128 H RX IP 250 OP 636: Performed by: STUDENT IN AN ORGANIZED HEALTH CARE EDUCATION/TRAINING PROGRAM

## 2018-10-23 PROCEDURE — 85027 COMPLETE CBC AUTOMATED: CPT | Performed by: INTERNAL MEDICINE

## 2018-10-23 PROCEDURE — 85018 HEMOGLOBIN: CPT | Performed by: FAMILY MEDICINE

## 2018-10-23 PROCEDURE — 83690 ASSAY OF LIPASE: CPT | Performed by: INTERNAL MEDICINE

## 2018-10-23 PROCEDURE — 36415 COLL VENOUS BLD VENIPUNCTURE: CPT | Performed by: FAMILY MEDICINE

## 2018-10-23 PROCEDURE — 99239 HOSP IP/OBS DSCHRG MGMT >30: CPT | Performed by: FAMILY MEDICINE

## 2018-10-23 PROCEDURE — 25000132 ZZH RX MED GY IP 250 OP 250 PS 637: Performed by: INTERNAL MEDICINE

## 2018-10-23 PROCEDURE — 00000146 ZZHCL STATISTIC GLUCOSE BY METER IP

## 2018-10-23 PROCEDURE — 25000132 ZZH RX MED GY IP 250 OP 250 PS 637: Performed by: FAMILY MEDICINE

## 2018-10-23 PROCEDURE — 80053 COMPREHEN METABOLIC PANEL: CPT | Performed by: INTERNAL MEDICINE

## 2018-10-23 PROCEDURE — 85610 PROTHROMBIN TIME: CPT | Performed by: INTERNAL MEDICINE

## 2018-10-23 PROCEDURE — 36415 COLL VENOUS BLD VENIPUNCTURE: CPT | Performed by: INTERNAL MEDICINE

## 2018-10-23 RX ORDER — WARFARIN SODIUM 3 MG/1
3 TABLET ORAL ONCE
Status: DISCONTINUED | OUTPATIENT
Start: 2018-10-23 | End: 2018-10-23 | Stop reason: CLARIF

## 2018-10-23 RX ORDER — HYDROCODONE BITARTRATE AND ACETAMINOPHEN 5; 325 MG/1; MG/1
1 TABLET ORAL EVERY 6 HOURS PRN
Qty: 5 TABLET | Refills: 0 | Status: SHIPPED | OUTPATIENT
Start: 2018-10-23 | End: 2018-11-12

## 2018-10-23 RX ORDER — WARFARIN SODIUM 3 MG/1
3 TABLET ORAL
Status: DISCONTINUED | OUTPATIENT
Start: 2018-10-23 | End: 2018-10-23

## 2018-10-23 RX ADMIN — SODIUM CHLORIDE, POTASSIUM CHLORIDE, SODIUM LACTATE AND CALCIUM CHLORIDE: 600; 310; 30; 20 INJECTION, SOLUTION INTRAVENOUS at 06:15

## 2018-10-23 RX ADMIN — HYDROCODONE BITARTRATE AND ACETAMINOPHEN 1 TABLET: 5; 325 TABLET ORAL at 09:35

## 2018-10-23 RX ADMIN — TAMSULOSIN HYDROCHLORIDE 0.4 MG: 0.4 CAPSULE ORAL at 08:18

## 2018-10-23 RX ADMIN — HYDROCODONE BITARTRATE AND ACETAMINOPHEN 1 TABLET: 5; 325 TABLET ORAL at 03:10

## 2018-10-23 RX ADMIN — LISINOPRIL 5 MG: 5 TABLET ORAL at 08:17

## 2018-10-23 RX ADMIN — MULTIPLE VITAMINS W/ MINERALS TAB 1 TABLET: TAB at 08:17

## 2018-10-23 RX ADMIN — METOPROLOL SUCCINATE 75 MG: 50 TABLET, EXTENDED RELEASE ORAL at 08:18

## 2018-10-23 RX ADMIN — GABAPENTIN 300 MG: 300 CAPSULE ORAL at 08:17

## 2018-10-23 ASSESSMENT — ACTIVITIES OF DAILY LIVING (ADL)
ADLS_ACUITY_SCORE: 11

## 2018-10-23 NOTE — MR AVS SNAPSHOT
Antoine Russo   10/23/2018   Anticoagulation Therapy Visit    Description:  72 year old male   Provider:  Pamela Isabel, RN   [facility]           INR as of 10/23/2018         The patient was not given dosing instructions in this encounter.      Anticoagulation Summary as of 10/23/2018         The patient was not given dosing instructions in this encounter.      Your next Anticoagulation Clinic appointment(s)     Nov 29, 2018  9:45 AM CST   Anticoagulation Visit with NB ANTI COAG   [facility] ([facility]    [address] [place] [id number]   [phone]              October 2018 Details    Sun Mon Tue Wed Thu Fri Sat      1               2               3               4               5               6                 7               8               9               10               11               12               13                 14               15               16               17               18      2.5 mg   See details      19      2.5 mg         20      2.5 mg           21      2.5 mg         22      2.5 mg         23      2.5 mg         24      2.5 mg         25      2.5 mg         26      2.5 mg         27      2.5 mg           28      2.5 mg         29      2.5 mg         30      2.5 mg         31      2.5 mg             Date Details   10/18 This INR check               How to take your warfarin dose     To take:  2.5 mg Take 1 of the 2.5 mg tablets.           November 2018 Details    Sun Mon Tue Wed Thu Fri Sat         1      2.5 mg         2      2.5 mg         3      2.5 mg           4      2.5 mg         5      2.5 mg         6      2.5 mg         7      2.5 mg         8      2.5 mg         9      2.5 mg         10      2.5 mg           11      2.5 mg         12      2.5 mg         13      2.5 mg         14      2.5 mg         15      2.5 mg         16      2.5 mg         17      2.5 mg           18      2.5 mg          19      2.5 mg         20      2.5 mg         21      2.5 mg         22      2.5 mg         23      2.5 mg         24      2.5 mg           25      2.5 mg         26      2.5 mg         27      2.5 mg         28      2.5 mg         29            30                 Date Details   No additional details    Date of next INR:  11/29/2018         How to take your warfarin dose     To take:  2.5 mg Take 1 of the 2.5 mg tablets.

## 2018-10-23 NOTE — PHARMACY-ANTICOAGULATION SERVICE
Clinical Pharmacy- Warfarin Discharge Note  This patient is currently on warfarin for the treatment of Atrial fibrillation.  INR Goal= 2-3  Expected length of therapy lifetime.    Warfarin PTA Regimen: 2.5mg daily      Anticoagulation Dose History     Recent Dosing and Labs Latest Ref Rng & Units 7/26/2018 8/23/2018 10/4/2018 10/18/2018 10/21/2018 10/22/2018 10/23/2018    Warfarin 2.5 mg - - - - - 2.5 mg 2.5 mg  -    INR 0.86 - 1.14 - - - - 2.51(H) 2.16(H) 1.84(H)    INR 0.86 - 1.14 2.3(A) 2.6(A) 1.7(A) 2.6(A) - - -          Vitamin K doses administered during the last 7 days: none  FFP administered during the last 7 days: none  Recommend discharging the patient on a warfarin regimen of 2.5 mg daily with a prescription for warfarin daily 2.5mg tablets.      The patient should have an INR checked 11/29/18

## 2018-10-23 NOTE — PHARMACY - DISCHARGE MEDICATION RECONCILIATION
Discharge medication review for this patient is complete. Pharmacist assisted with medication reconciliation of discharge medications with prior to admission medications.     The following changes were made to the discharge medication list based on pharmacist review:  Added:  none  Discontinued: none  Changed: none      Patient's Discharge Medication List  - medications as listed on After Visit Summary (AVS)     Review of your medicines      START taking       Dose / Directions    HYDROcodone-acetaminophen 5-325 MG per tablet   Commonly known as:  NORCO   Used for:  Acute pancreatitis, unspecified complication status, unspecified pancreatitis type        Dose:  1 tablet   Take 1 tablet by mouth every 6 hours as needed for moderate to severe pain   Quantity:  5 tablet   Refills:  0         CONTINUE these medicines which have NOT CHANGED       Dose / Directions    ASPIRIN NOT PRESCRIBED   Commonly known as:  INTENTIONAL        Antiplatelet medication not prescribed intentionally due to Current anticoagulant therapy (warfarin/enoxaparin)   Refills:  0       gabapentin 300 MG capsule   Commonly known as:  NEURONTIN   Used for:  Type 2 diabetes mellitus with diabetic polyneuropathy, without long-term current use of insulin (H)        Take one in am, and 2 tablest (600 mg) every night   Quantity:  270 capsule   Refills:  3       lisinopril 5 MG tablet   Commonly known as:  PRINIVIL/ZESTRIL   Used for:  Hypertension, benign essential, goal below 140/90        TAKE 1 TABLET BY MOUTH ONCE DAILY   Quantity:  90 tablet   Refills:  1       metFORMIN 500 MG 24 hr tablet   Commonly known as:  GLUCOPHAGE-XR   Used for:  Type 2 diabetes mellitus with diabetic polyneuropathy, without long-term current use of insulin (H)        TAKE TWO TABLETS BY MOUTH TWICE DAILY WITH MEALS   Quantity:  360 tablet   Refills:  0       metoprolol succinate 50 MG 24 hr tablet   Commonly known as:  TOPROL-XL   Used for:  Hypertension, benign essential,  goal below 140/90        Dose:  75 mg   Take 1.5 tablets (75 mg) by mouth 2 times daily   Quantity:  270 tablet   Refills:  3       multivitamin, therapeutic with minerals Tabs tablet   Used for:  Surgery aftercare        Dose:  1 tablet   Take 1 tablet by mouth daily.   Quantity:  30 each   Refills:  1       tamsulosin 0.4 MG capsule   Commonly known as:  FLOMAX   Used for:  Urgency of urination, Hypertrophy of prostate without urinary obstruction        Dose:  0.4 mg   Take 1 capsule (0.4 mg) by mouth daily   Quantity:  90 capsule   Refills:  3       warfarin 2.5 MG tablet   Commonly known as:  COUMADIN   Used for:  Chronic atrial fibrillation (H)        Dose:  2.5 mg   Take 2.5 mg by mouth daily 2.5 mg daily or as directed by Anticoagulation Clinic   Quantity:  90 tablet   Refills:  0            Where to get your medicines      Some of these will need a paper prescription and others can be bought over the counter. Ask your nurse if you have questions.     Bring a paper prescription for each of these medications      HYDROcodone-acetaminophen 5-325 MG per tablet

## 2018-10-23 NOTE — PROGRESS NOTES
Admitted to [facility] 10/21/18 - 10/23/18 for Acute on chronic Pancreatitis. Patient new medication Norco prn. Pain well controlled without use of Norco upon discharge.   He was abstinent from alcohol from January through mid July.  Starting in mid July, he has been using 3-4 beers per day.  His last hospital admission for pancreatitis was in April.  He had necrotizing pancreatitis at that time.  He was previously on pancreatic enzymes, but stopped these in about June, because they are too expensive.   Warfarin dosing while inpatient:  10/21 10/22 10/23            8 hr:  16-00 00-08 08-16 16-00 00-08 08-16 16-00 00-08 08-16 16-00 00-08 08-16 16-00 00-08 08-16 16-00       Warfarin Assess    Indication         Atrial Fib   Atrial Fib       Indication     Therapy Goal         INR 2-3   INR 2-3       Therapy Goal     Provider/Team         Troy Mejia          Provider/Team     [facility]         [facility]       [facility]     Warfarin Prior to Admission         Yes   Yes       Warfarin Prior to Admission     Warfarin PTA Regimen         2.5mg daily   2.5mg daily       Warfarin PTA Regimen     Significant drug interactions                   Significant drug interactions     Recent documented change in oral intake/nutrition         Unknown          Recent documented change in oral intake/nutrition     Dose Comments            2.5mg       Dose Comments     Anticoagulants    Warfarin (mg)           2.5   2.5     Warfarin (mg)     Labs    INR         2.51   2.16   1.84    INR     Platelets         200   179   156    Platelets     Creatinine         0.86   0.91   0.88    Creatinine     Hgb         14.2   14.2   11.9 12.8   Hgb     ALT         26   18   17    ALT     AST         21   15   13    AST     Weights    Actual Wt         197 lb 5 ...      199 lb 8.3...    Actual Wt

## 2018-10-23 NOTE — PROGRESS NOTES
Patient plans to discharge today. Wanted IV SL since IV fluid bag has run out. Dr Nevarez updated, ok to SL.

## 2018-10-23 NOTE — DISCHARGE SUMMARY
[facility] Discharge Summary    Antoine Russo MRN# [id number]   Age: 72 year old YOB: 1945     Date of Admission:  10/21/2018  Date of Discharge::  10/23/2018  Admitting Physician:  Citlalli Mejias DO  Discharge Physician:  Willam Nevarez MD, MD             Admission Diagnoses:   LUQ abdominal pain [R10.12]  Acute pancreatitis, unspecified complication status, unspecified pancreatitis type [K85.90]          Principle Discharge Diagnosis:       Acute on chronic Pancreatitis    See hospital course for further active diagnoses addressed during this admission.            Procedures:   No procedures performed during this admission          Medications Prior to Admission:     Prescriptions Prior to Admission   Medication Sig Dispense Refill Last Dose     ASPIRIN NOT PRESCRIBED (INTENTIONAL) Antiplatelet medication not prescribed intentionally due to Current anticoagulant therapy (warfarin/enoxaparin)   Taking     gabapentin (NEURONTIN) 300 MG capsule Take one in am, and 2 tablest (600 mg) every night 270 capsule 3 10/20/2018 at [facility]     lisinopril (PRINIVIL/ZESTRIL) 5 MG tablet TAKE 1 TABLET BY MOUTH ONCE DAILY 90 tablet 1 10/20/2018 at AM     metFORMIN (GLUCOPHAGE-XR) 500 MG 24 hr tablet TAKE TWO TABLETS BY MOUTH TWICE DAILY WITH MEALS 360 tablet 0 10/20/2018 at [facility]i     metoprolol succinate (TOPROL-XL) 50 MG 24 hr tablet Take 1.5 tablets (75 mg) by mouth 2 times daily 270 tablet 3 10/20/2018 at [facility]     multivitamin, therapeutic with minerals (THERA-VIT-M) TABS Take 1 tablet by mouth daily. 30 each 1 10/20/2018 at AM     tamsulosin (FLOMAX) 0.4 MG capsule Take 1 capsule (0.4 mg) by mouth daily 90 capsule 3 10/20/2018 at AM     warfarin (COUMADIN) 2.5 MG tablet Take 2.5 mg by mouth daily 2.5 mg daily or as directed by Anticoagulation Clinic 90 tablet 0 10/20/2018 at [facility]             Discharge Medications:     Current Discharge Medication List      START taking these medications    Details    HYDROcodone-acetaminophen (NORCO) 5-325 MG per tablet Take 1 tablet by mouth every 6 hours as needed for moderate to severe pain  Qty: 5 tablet, Refills: 0    Associated Diagnoses: Acute pancreatitis, unspecified complication status, unspecified pancreatitis type         CONTINUE these medications which have NOT CHANGED    Details   ASPIRIN NOT PRESCRIBED (INTENTIONAL) Antiplatelet medication not prescribed intentionally due to Current anticoagulant therapy (warfarin/enoxaparin)      gabapentin (NEURONTIN) 300 MG capsule Take one in am, and 2 tablest (600 mg) every night  Qty: 270 capsule, Refills: 3    Associated Diagnoses: Type 2 diabetes mellitus with diabetic polyneuropathy, without long-term current use of insulin (H)      lisinopril (PRINIVIL/ZESTRIL) 5 MG tablet TAKE 1 TABLET BY MOUTH ONCE DAILY  Qty: 90 tablet, Refills: 1    Associated Diagnoses: Hypertension, benign essential, goal below 140/90      metFORMIN (GLUCOPHAGE-XR) 500 MG 24 hr tablet TAKE TWO TABLETS BY MOUTH TWICE DAILY WITH MEALS  Qty: 360 tablet, Refills: 0    Associated Diagnoses: Type 2 diabetes mellitus with diabetic polyneuropathy, without long-term current use of insulin (H)      metoprolol succinate (TOPROL-XL) 50 MG 24 hr tablet Take 1.5 tablets (75 mg) by mouth 2 times daily  Qty: 270 tablet, Refills: 3    Associated Diagnoses: Hypertension, benign essential, goal below 140/90      multivitamin, therapeutic with minerals (THERA-VIT-M) TABS Take 1 tablet by mouth daily.  Qty: 30 each, Refills: 1    Associated Diagnoses: Surgery aftercare      tamsulosin (FLOMAX) 0.4 MG capsule Take 1 capsule (0.4 mg) by mouth daily  Qty: 90 capsule, Refills: 3    Associated Diagnoses: Urgency of urination; Hypertrophy of prostate without urinary obstruction      warfarin (COUMADIN) 2.5 MG tablet Take 2.5 mg by mouth daily 2.5 mg daily or as directed by Anticoagulation Clinic  Qty: 90 tablet, Refills: 0    Associated Diagnoses: Chronic atrial  "fibrillation (H)                   Consultations:   No consultations were requested during this admission          Brief History of Illness:     From Admission H+P:   This patient is a 72 year old male with a past medical history of chronic pancreatitis due to IPMN and alcohol, paroxysmal A. fib, type 2 diabetes, hypertension history of colitis status post partial colectomy, history of head and neck cancer who presents with 2 days of left upper abdominal and epigastric pain and nausea, and found to have acute recurrent pancreatitis.     Reports gradual onset of left upper abdominal and epigastric pain over the last 2-3 days.  It significantly worsened hours prior to ER presentation.  He has not changed his diet or taking any medications to help with the pain.  He was abstinent from alcohol from January through mid July.  Starting in mid July, he has been using 3-4 beers per day.  His last hospital admission for pancreatitis was in April.  He had necrotizing pancreatitis at that time.  He was previously on pancreatic enzymes, but stopped these in about June, because they are too expensive                TODAY:     Subjective:  Doing well. Pain 1-2/10, no norco since 9am today, tolerated regular diet for breakfast and lunch today without any increase in pain. Feels ready for discharge home today.  No other concerns.  No other pain.       ROS:   ROS: 10 point ROS neg other than the symptoms noted above in the HPI.-     /56 (BP Location: Right arm)  Pulse 81  Temp 98.1  F (36.7  C) (Oral)  Resp 18  Ht 1.778 m (5' 10\")  Wt 90.5 kg (199 lb 8.3 oz)  SpO2 91%  BMI 28.63 kg/m2   EXAM:  General: awake and alert, NAD, oriented x 3  Head: normocephalic  Neck: unremarkable, no lymphadenopathy   HEENT: oropharynx pink and moist    Heart: Regular rate and rhythm, no murmurs, rubs, or gallops  Lungs: clear to auscultation bilaterally with good air movement throughout  Abdomen: soft, very minimal tenderness upper mid " abdomen with deep palpation only - essentially normal exam which is otherwise non-tender, no masses or organomegaly, non-distended, normal bowel sounds   Extremities: no edema in lower extremities   Skin unremarkable.            Hospital Course:     This patient is a 72 year old male with a past medical history of chronic pancreatitis due to IPMN and alcohol, paroxysmal A. fib, type 2 diabetes, hypertension history of colitis status post partial colectomy, history of head and neck cancer who presents with 2 days of left upper abdominal and epigastric pain and nausea, and found to have acute recurrent pancreatitis.      Acute on chronic Pancreatitis:   10/21/18 -- due to EtOH.  He has a history of a distal pancreatectomy for IPMN of the tail in the setting of chronic pancreatitis.  He has history of necrotizing pancreatitis, pancreatic duct leak, and a history of a stent earlier in 2018, since removed.  He was abstinent from EtOH from Jan-July, then started using 3-4 beers/day starting in July.  He also stopped his pancreatic enzymes in June due to cost.    --NPO, IVF /hr  --Dilaudid and norco prn pain   10/22/2018 -- improving.  Continue norco prn, starting clears. Triglycerides normal.   10/23/2018 -- tolerating normal diet with minimal pain.  Ok to discharge today - recommend complete abstinence from alcohol, gave #5 norco in case needed but hopefully won't need any.   Follow-up with primary care provider in 1 week.  Can discuss if he's interested in restarting his pancreatic enzymes at that time but not currently interested.       Diabetes-2 with Peripheral Neuropathy:   10/21/18 -- A1c 7.1 in Aug.  On metformin, doesn't check blood sugar  --BID blood sugar check, low dose sliding scale novolog while NPO.  Consider more frequent check is hyper/hypo -glycemia  --Hold metformin  10/23/2018 -- ok for discharge on prior to admission regimen.  A1c 6.9.        parox A-fib:   10/21/18 -- chronic and stable,  continue prior to admission warfarin  10/23/2018 -- no change.      Hypertension:   10/21/18 -- chronic and stable.  Blood pressure elevated since admission, will monitor closely  --continue prior to admission lisinopril and metoprolol  10/23/2018 -- doing well, no change       BPH:    Monitor for urine retention. continue flomax      Non-Obstructive CAD:    10/21/18 -- Seen on angiogram 2009, no stents or history of MI.  Not on statin for unclear reasons.  Not on ASA, on warfarin  10/23/2018 -- stable.      Prophylaxis  On coumadin, unchanged.            Discharge Instructions and Follow-Up:   Discharge diet: Advance to a regular diet as tolerated   Discharge activity: Activity as tolerated   Discharge follow-up: Follow up with primary care provider in 7 days           Discharge Disposition:   Discharged to home      Attestation:  I have reviewed today's vital signs, notes, medications, labs and imaging.  Amount of time performed on this discharge summary: 45 minutes.    Willam Nevarez MD, MD

## 2018-11-07 DIAGNOSIS — I48.20 CHRONIC ATRIAL FIBRILLATION (H): ICD-10-CM

## 2018-11-08 RX ORDER — WARFARIN SODIUM 2.5 MG/1
TABLET ORAL
Qty: 90 TABLET | Refills: 0 | Status: SHIPPED | OUTPATIENT
Start: 2018-11-08 | End: 2019-02-11

## 2018-11-08 NOTE — TELEPHONE ENCOUNTER
Signed Prescriptions:                        Disp   Refills    warfarin (COUMADIN) 2.5 MG tablet          90 tab*0        Si.5 mg daily or as directed by [facility]  Authorizing Provider: WILLA GARCIA  Ordering User: JERICHO KUNZ RN refilled medication per [facility] refill protocol.  Jericho Kunz RN

## 2018-11-12 ENCOUNTER — OFFICE VISIT (OUTPATIENT)
Dept: NEUROLOGY | Facility: CLINIC | Age: 73
End: 2018-11-12
Payer: COMMERCIAL

## 2018-11-12 VITALS
DIASTOLIC BLOOD PRESSURE: 57 MMHG | BODY MASS INDEX: 27.52 KG/M2 | WEIGHT: 191.8 LBS | SYSTOLIC BLOOD PRESSURE: 131 MMHG | RESPIRATION RATE: 12 BRPM | HEART RATE: 72 BPM | TEMPERATURE: 97.5 F

## 2018-11-12 DIAGNOSIS — G62.9 PERIPHERAL POLYNEUROPATHY: Primary | ICD-10-CM

## 2018-11-12 DIAGNOSIS — E11.42 TYPE 2 DIABETES MELLITUS WITH DIABETIC POLYNEUROPATHY, WITHOUT LONG-TERM CURRENT USE OF INSULIN (H): ICD-10-CM

## 2018-11-12 PROCEDURE — 99243 OFF/OP CNSLTJ NEW/EST LOW 30: CPT | Performed by: PSYCHIATRY & NEUROLOGY

## 2018-11-12 RX ORDER — GABAPENTIN 300 MG/1
CAPSULE ORAL
Qty: 150 CAPSULE | Refills: 5 | Status: SHIPPED | OUTPATIENT
Start: 2018-11-12 | End: 2018-11-13

## 2018-11-12 NOTE — PATIENT INSTRUCTIONS
Plan:    -- Increase the gabapentin to 300mg in the morning and 300mg midday dose and 900mg in the evening. Monitor for side effects. We can increase further if this dose schedule does not calm your symptoms.  -- Nerve conduction studies/EMG. Based on the results, I will let you know if any additional test are recommended.   -- The neuropathy is very likely related to the diabetes, as you know, so tight glucose control is also important. This can be managed through your primary care provider.   -- Return to clinic in 3 months.

## 2018-11-12 NOTE — MR AVS SNAPSHOT
After Visit Summary   11/12/2018    Antoine Russo    MRN: [id number]           Patient Information     Date Of Birth          1945        Visit Information        Provider Department      11/12/2018 8:45 AM Anais Guajardo MD [facility]        Today's Diagnoses     Peripheral polyneuropathy    -  1    Type 2 diabetes mellitus with diabetic polyneuropathy, without long-term current use of insulin (H)          Care Instructions    Plan:    -- Increase the gabapentin to 300mg in the morning and 300mg midday dose and 900mg in the evening. Monitor for side effects. We can increase further if this dose schedule does not calm your symptoms.  -- Nerve conduction studies/EMG. Based on the results, I will let you know if any additional test are recommended.   -- The neuropathy is very likely related to the diabetes, as you know, so tight glucose control is also important. This can be managed through your primary care provider.   -- Return to clinic in 3 months.           Follow-ups after your visit        Additional Services     ORTHO  REFERRAL       [facility] is referring you to the Orthopedic  Services at [facility].       The  Representative will assist you in the coordination of your Orthopedic and Musculoskeletal Care as prescribed by your physician.    The  Representative will call you within 1 business day to help schedule your appointment, or you may contact the  Representative at:    All areas ~ [phone]     Type of Referral : Non Surgical / Dr. Perea for nerve conduction studies/EMG - LE signs of neurapathy, likely related to diabetes.       Timeframe requested: Routine    Coverage of these services is subject to the terms and limitations of your health insurance plan.  Please call member services at your health plan with any benefit or coverage questions.      If X-rays,  CT or MRI's have been performed, please contact the facility where they were done to arrange for , prior to your scheduled appointment.  Please bring this referral request to your appointment and present it to your specialist.                  Your next 10 appointments already scheduled     Nov 27, 2018  9:00 AM CST   Office Visit with Katie Gross MD   [facility]    [address] [place] [id number] [phone]           Bring a current list of meds and any records pertaining to this visit. For Physicals, please bring immunization records and any forms needing to be filled out. Please arrive 10 minutes early to complete paperwork.            Nov 29, 2018  9:45 AM CST   Anticoagulation Visit with NB ANTI COAG   [facility]    [address] [place] [address] [phone]              Who to contact     If you have questions or need follow up information about today's clinic visit or your schedule please contact [facility] directly at [phone].  Normal or non-critical lab and imaging results will be communicated to you by [facility], letter or phone within 4 business days after the clinic has received the results. If you do not hear from us within 7 days, please contact the clinic through [employer] or phone. If you have a critical or abnormal lab result, we will notify you by phone as soon as possible.  Submit refill requests through [employer] or call your pharmacy and they will forward the refill request to us. Please allow 3 business days for your refill to be completed.          Additional Information About Your Visit        [facility] Information     [employer] gives you secure access to your electronic health record. If you see a primary care provider, you can also send messages to your care team and make appointments. If you have questions, please call your primary  care clinic.  If you do not have a primary care provider, please call [phone] and they will assist you.        Care EveryWhere ID     This is your Care EveryWhere ID. This could be used by other organizations to access your [place] medical records  [id number]        Your Vitals Were     Pulse Temperature Respirations BMI (Body Mass Index)          72 97.5  F (36.4  C) (Tympanic) 12 27.52 kg/m2         Blood Pressure from Last 3 Encounters:   11/12/18 131/57   10/23/18 128/56   08/23/18 120/64    Weight from Last 3 Encounters:   11/12/18 87 kg (191 lb 12.8 oz)   10/23/18 90.5 kg (199 lb 8.3 oz)   08/23/18 84.8 kg (187 lb)              We Performed the Following     ORTHO  REFERRAL          Today's Medication Changes          These changes are accurate as of 11/12/18  9:11 AM.  If you have any questions, ask your nurse or doctor.               These medicines have changed or have updated prescriptions.        Dose/Directions    gabapentin 300 MG capsule   Commonly known as:  NEURONTIN   This may have changed:  additional instructions   Used for:  Type 2 diabetes mellitus with diabetic polyneuropathy, without long-term current use of insulin (H)   Changed by:  Anais Guajardo MD        Take one in am, and 1 at noontime and 3 (900mg) at bedtime.   Quantity:  150 capsule   Refills:  5            Where to get your medicines      These medications were sent to [facility] [address] [place] [address]  [address] [facility] [address]     Phone:  [phone]     gabapentin 300 MG capsule                Primary Care Provider Office Phone # Fax #    Katie Gross [phone]       [address] [place] [address]        Equal Access to Services     ABBEY WALTON AH: Raquel Bernstein, wazoraidada mckayla, qaybta kaalmada byron, koffi fay. So [facility] [phone].    ATENCIÓN: Si amaury mccray snyder  disposición servicios gratuitos de asistencia lingüística. Kia kuhn [phone].    We comply with applicable federal civil rights laws and [place] laws. We do not discriminate on the basis of race, color, national origin, age, disability, sex, sexual orientation, or gender identity.            Thank you!     Thank you for choosing [facility]  for your care. Our goal is always to provide you with excellent care. Hearing back from our patients is one way we can continue to improve our services. Please take a few minutes to complete the written survey that you may receive in the mail after your visit with us. Thank you!             Your Updated Medication List - Protect others around you: Learn how to safely use, store and throw away your medicines at www.disposemymeds.org.          This list is accurate as of 11/12/18  9:11 AM.  Always use your most recent med list.                   Brand Name Dispense Instructions for use Diagnosis    ASPIRIN NOT PRESCRIBED    INTENTIONAL     Antiplatelet medication not prescribed intentionally due to Current anticoagulant therapy (warfarin/enoxaparin)        gabapentin 300 MG capsule    NEURONTIN    150 capsule    Take one in am, and 1 at noontime and 3 (900mg) at bedtime.    Type 2 diabetes mellitus with diabetic polyneuropathy, without long-term current use of insulin (H)       lisinopril 5 MG tablet    PRINIVIL/ZESTRIL    90 tablet    TAKE 1 TABLET BY MOUTH ONCE DAILY    Hypertension, benign essential, goal below 140/90       metFORMIN 500 MG 24 hr tablet    GLUCOPHAGE-XR    360 tablet    TAKE TWO TABLETS BY MOUTH TWICE DAILY WITH MEALS    Type 2 diabetes mellitus with diabetic polyneuropathy, without long-term current use of insulin (H)       metoprolol succinate 50 MG 24 hr tablet    TOPROL-XL    270 tablet    Take 1.5 tablets (75 mg) by mouth 2 times daily    Hypertension, benign essential, goal below 140/90       multivitamin, therapeutic with minerals Tabs  tablet     30 each    Take 1 tablet by mouth daily.    Surgery aftercare       tamsulosin 0.4 MG capsule    FLOMAX    90 capsule    Take 1 capsule (0.4 mg) by mouth daily    Urgency of urination, Hypertrophy of prostate without urinary obstruction       * warfarin 2.5 MG tablet    COUMADIN    90 tablet    Take 2.5 mg by mouth daily 2.5 mg daily or as directed by Anticoagulation Clinic    Chronic atrial fibrillation (H)       * warfarin 2.5 MG tablet    COUMADIN    90 tablet    2.5 mg daily or as directed by [facility]    Chronic atrial fibrillation (H)       * Notice:  This list has 2 medication(s) that are the same as other medications prescribed for you. Read the directions carefully, and ask your doctor or other care provider to review them with you.

## 2018-11-12 NOTE — LETTER
11/12/2018         RE: Antoine Russo  [address] [address]        Dear Colleague,    Thank you for referring your patient, Antoine Russo, to the [facility]. Please see a copy of my visit note below.    INITIAL NEUROLOGY CONSULTATION    DATE OF VISIT: 11/12/2018  MRN: [id number]  PATIENT NAME: Antoine Russo  YOB: 1945    REFERRING PROVIDER: No ref. provider found    Chief Complaint   Patient presents with     New Patient     Neuropathy.  Referral by Katie Gross MD.       SUBJECTIVE:                                                      HPI:   Antoine Russo is a 72 year old male whoWhom I was asked by Dr. Gross to see in consultation for ziniging pain in the feet. The patient has been seen in podiatry, but this was several years ago and per Dr. Ventura s note (2013) there was not exam evidence of neuropathy at the time. The patient does have relevant history of diabetes. He also has atrial fibrillation (on warfarin), HLD, HTN, CAD.      The patient tells me he was diagnosed with neuropathy by his primary care provider years ago. He has pain/tingling and numbness in the feet. He says his toes are starting to curl a little on the Left foot only, as well which is new. It only affects the feet, symptoms do not occur proximally to the ankles. No symptoms in the hands. He as not noticed weakness in the feet. No tripping over the feet or falls. He says that the zingers have gotten worse. The feet feel numb and walking on the bare floor is painful. No nerve conduction studies/EMG on record.     His HbA1c was 6.9% last month. He was in the hospital last month for recurrence of pancreatitis.  He does not really drink much alcohol now and describes that he has cut back from 3-4 beers per night.      He says the gabapentin did initially help with the zinging. He takes 300mg Qam and 600mg Qpm. He denies side effects from the medication.     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 No chemotherapy or radiation treatments.  The patient mentions that he was not happy with his prior consultation with podiatry.     No one in the family with neuropathy or other neurologic problems, per patient. Chart indicates dementia in both parents.     Past Medical History:   Diagnosis Date     C. 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     Noninfectious ileitis      Recurrent pancreatitis (H)      Past Surgical History:   Procedure Laterality Date     BREAST SURGERY  2008    right breast mass benign     COLONOSCOPY      multiple polyps removed     COLONOSCOPY  8/24/2011    Procedure:COMBINED COLONOSCOPY, REMOVE TUMOR/POLYP/LESION BY SNARE; Surgeon:MILEY ARBOLEDA; Location:[facility]     COLONOSCOPY  12/17/2012    Procedure: COLONOSCOPY;;  Surgeon: Leon Maurer MD;  [facility]     COLONOSCOPY  12/18/2012    Procedure: COLONOSCOPY;;  Surgeon: Leon Maurer MD;  [facility]     DENERVATION OF SPERMATIC CORD MICROSURGICAL Left 5/23/2017    Procedure: DENERVATION OF SPERMATIC CORD MICROSURGICAL;;  Surgeon: Marcio Aggarwal MD;  [facility]     DISSECTION RADICAL NECK BILATERAL  8/2/2012    Procedure: DISSECTION RADICAL NECK BILATERAL;;  Surgeon: Yung Alvares MD;  [facility]     ENDOSCOPIC RETROGRADE CHOLANGIOPANCREATOGRAM N/A 5/10/2016    Procedure: COMBINED ENDOSCOPIC RETROGRADE CHOLANGIOPANCREATOGRAPHY, PLACE TUBE/STENT;  Surgeon: Yovanny Beasley MD;  [facility]     ENDOSCOPIC RETROGRADE CHOLANGIOPANCREATOGRAM N/A 3/29/2018    Procedure: ENDOSCOPIC RETROGRADE CHOLANGIOPANCREATOGRAM;  Endoscopic Retrograde Cholangiopancreatogram, Endoscopic Ultrasound, Biliary Sphincterotomy, Biliary and Pancreatic Stent Placement;  Surgeon: Yovanny Beasley  MD Clement;  [facility]     ENDOSCOPIC ULTRASOUND UPPER GASTROINTESTINAL TRACT (GI) N/A 2/3/2016    Procedure: ENDOSCOPIC ULTRASOUND, ESOPHAGOSCOPY / UPPER GASTROINTESTINAL TRACT (GI);  Surgeon: Grabiel Plata MD;  [facility]     ENDOSCOPIC ULTRASOUND UPPER GASTROINTESTINAL TRACT (GI) N/A 3/29/2018    Procedure: ENDOSCOPIC ULTRASOUND, ESOPHAGOSCOPY / UPPER GASTROINTESTINAL TRACT (GI);;  Surgeon: Grabiel Plata MD;  [facility]     ESOPHAGOSCOPY, GASTROSCOPY, DUODENOSCOPY (EGD), COMBINED N/A 2/3/2016    Procedure: COMBINED ENDOSCOPIC ULTRASOUND, ESOPHAGOSCOPY, GASTROSCOPY, DUODENOSCOPY (EGD), FINE NEEDLE ASPIRATE/BIOPSY;  Surgeon: Grabiel Plata MD;  [facility]     ESOPHAGOSCOPY, GASTROSCOPY, DUODENOSCOPY (EGD), COMBINED N/A 6/8/2016    Procedure: COMBINED ESOPHAGOSCOPY, GASTROSCOPY, DUODENOSCOPY (EGD), REMOVE FOREIGN BODY;  Surgeon: Yovanny Beasley MD;  [facility]     ESOPHAGOSCOPY, GASTROSCOPY, DUODENOSCOPY (EGD), COMBINED N/A 4/17/2018    Procedure: COMBINED ESOPHAGOSCOPY, GASTROSCOPY, DUODENOSCOPY (EGD), REMOVE FOREIGN BODY;  EGD with stent removal;  Surgeon: Grabiel Plata MD;  [facility]     EXCISE LESION INTRAORAL  6/14/2012    Procedure: EXCISE LESION INTRAORAL;  Wide Local Excision Floor of Mouth, Direct Laryngoscopy, Bilateral McIntyre's Marsuplization, Split Thickness Skin Graft from right Thigh  Latex Safe;  Surgeon: Gerson Ravi MD;  [facility]     EXCISE LESION INTRAORAL  8/2/2012    Procedure: EXCISE LESION INTRAORAL;  Floor of Mouth Resection, Bilateral Selective Radical Neck Dissection, Tracheostomy, Left Radial Forearm  Free Flap with Alloderm, Nasogastric Feeding Tube Placement,    * Latex Safe*;  Surgeon: Gerson Ravi MD;  [facility]     EXCISE LESION INTRAORAL  12/11/2012    Procedure: EXCISE LESION INTRAORAL;  takedown of oral flap;  Surgeon: Yung Alvares MD;  [facility]     GRAFT FREE VASCULARIZED (LOCATION)   8/2/2012    Procedure: GRAFT FREE VASCULARIZED (LOCATION);;  Surgeon: Yung Alvares MD;  [facility]     GRAFT SKIN SPLIT THICKNESS FROM EXTREMITY  6/14/2012    Procedure: GRAFT SKIN SPLIT THICKNESS FROM EXTREMITY;;  Surgeon: Gerson Ravi MD;  [facility]     LAPAROSCOPIC ILEOSTOMY TAKEDOWN  6/6/2013    Procedure: LAPAROSCOPIC ILEOSTOMY TAKEDOWN;  Laparoscopic Closure of Enterostomy, Guerda's Type with IleoRectal Anastomosis ;  Surgeon: Grabiel Riddle MD;  [facility]     LAPAROTOMY EXPLORATORY  12/20/2012    Procedure: LAPAROTOMY EXPLORATORY;  Exploratory Laparotomy, total abdominal colectomy, ileostomy formation;  Surgeon: Miquel Cannon MD;  [facility]     LARYNGOSCOPY  6/14/2012    Procedure: LARYNGOSCOPY;;  Surgeon: Gerson Ravi MD;  [facility]     ORTHOPEDIC SURGERY      ganglian cyst left ankle     PANCREATECTOMY, SPLENECTOMY N/A 3/10/2016    Procedure: PANCREATECTOMY, SPLENECTOMY;  Surgeon: Nael Abel MD;  [facility]     SHOULDER SURGERY  2006, 2008    2006- right rotator cuff, 2008 bone spur on left. Dr. Hdez     VARICOCELECTOMY Left 5/23/2017    Procedure: VARICOCELECTOMY;  Left Varicocele Repair, Denervation of Left Testis;  Surgeon: Marcio Aggarwal MD;  [facility]         Current Outpatient Prescriptions on File Prior to Visit:  lisinopril (PRINIVIL/ZESTRIL) 5 MG tablet TAKE 1 TABLET BY MOUTH ONCE DAILY   metFORMIN (GLUCOPHAGE-XR) 500 MG 24 hr tablet TAKE TWO TABLETS BY MOUTH TWICE DAILY WITH MEALS   metoprolol succinate (TOPROL-XL) 50 MG 24 hr tablet Take 1.5 tablets (75 mg) by mouth 2 times daily   multivitamin, therapeutic with minerals (THERA-VIT-M) TABS Take 1 tablet by mouth daily.   tamsulosin (FLOMAX) 0.4 MG capsule Take 1 capsule (0.4 mg) by mouth daily   warfarin (COUMADIN) 2.5 MG tablet Take 2.5 mg by mouth daily 2.5 mg daily or as directed by Anticoagulation Clinic   ASPIRIN NOT PRESCRIBED (INTENTIONAL) Antiplatelet medication not  prescribed intentionally due to Current anticoagulant therapy (warfarin/enoxaparin)   warfarin (COUMADIN) 2.5 MG tablet 2.5 mg daily or as directed by [facility] (Patient not taking: Reported on 2018)   [DISCONTINUED] gabapentin (NEURONTIN) 300 MG capsule Take one in am, and 2 tablest (600 mg) every night     No current facility-administered medications on file prior to visit.   Allergies   Allergen Reactions     Nkda [No Known Drug Allergies]         Problem (# of Occurrences) Relation (Name,Age of Onset)    Alzheimer Disease (2) Mother:  80, Father:  85    Aneurysm (1) Sister    Diabetes (3) Sister: onset age 50, Other: nephew type 1, Other       Negative family history of: Anesthesia Reaction, Colon Cancer, Colon Polyps, Crohn Disease, Ulcerative Colitis        Social History   Substance Use Topics     Smoking status: Former Smoker     Packs/day: 1.00     Years: 40.00     Types: Cigarettes     Quit date: 2006     Smokeless tobacco: Never Used     Alcohol use 16.8 oz/week     28 Cans of beer per week       REVIEW OF SYSTEMS:                                                      10-point review of systems is negative except as mentioned above in HPI.     EXAM:                                                      Physical Exam:   Vitals: /57 (BP Location: Left arm, Patient Position: Sitting, Cuff Size: Adult Regular)  Pulse 72  Temp 97.5  F (36.4  C) (Tympanic)  Resp 12  Wt 87 kg (191 lb 12.8 oz)  BMI 27.52 kg/m2  BMI= Body mass index is 27.52 kg/(m^2).  GENERAL: NAD.   HEENT: NC/AT. Multiple scars along the anterior neck.  CV: RRR. S1 and S2.  Neurologic:  MENTAL STATUS: Alert, attentive. Speech is fluent, with normal naming repetition comprehension.  Normal  concentration. Adequate fund of knowledge.   CRANIAL NERVES: Visual fields intact to confrontation. Pupils equally, round and reactive to light. Facial sensation and movement normal. EOM full. Hearing intact to conversation. Trapezius  strength intact. Palate moves symmetrically. Tongue midline.  MOTOR: 5/5 in proximal and distal muscle groups of upper and lower extremities. Tone and bulk normal.   DTRs: Intact and symmetric in UEs. 2+ at knees. Trace ankle jerks. Babinski down-going bilaterally.   SENSATION: Normal light touch and pinprick in hands. Cannot feel pinprick distal to mid-calf. Poor proprioception at great toes. Vibration: Diminished at both ankles.   COORDINATION: Normal finger nose finger. Finger tapping normal.  Knee heel shin normal.  STATION AND GAIT: Romberg negative. Tandem - patient refuses. Casual gait is antalgic.  EXTR: Feet are flat. Slight contracture of toes on Left foot. Right hand-dominant.     ASSESSMENT and PLAN:                                                      Assessment and Plan:     ICD-10-CM    1. Peripheral polyneuropathy G62.9 ORTHO  REFERRAL   2. Type 2 diabetes mellitus with diabetic polyneuropathy, without long-term current use of insulin (H) E11.42 gabapentin (NEURONTIN) 300 MG capsule        Mr. Russo is a pleasant 73 yo man seen in neurology today for symptoms consistent with peripheral neuropathy, likely related to his diabetes and possibly, I suspect, heavy alcohol use in the past. He also has musculoskeletal deformities of the feet, which are likely contributing to his pain. I will defer re-referral back to podiatry to the primary care provider, if felt to be appropriate. I think the patient would benefit from orthotics.    We discussed alternative causes for neuropathy as well, but given the history and current symptoms, I do not think we need to additional blood work at this time. I would like to confirm a length-dependent process with electrodiagnostic testing and try increasing the gabapentin for symptomatic management. The patient understands and agrees with the plan.     Patient Instructions:  -- Increase the gabapentin to 300mg in the morning and 300mg midday dose and 900mg in  the evening. Monitor for side effects. We can increase further if this dose schedule does not calm your symptoms.  -- Nerve conduction studies/EMG. Based on the results, I will let you know if any additional test are recommended.   -- The neuropathy is very likely related to the diabetes, as you know, so tight glucose control is also important. This can be managed through your primary care provider.   -- Return to clinic in 3 months.     Total Time: 45 minutes were spent with the patient. More than 50% of the time spent on counseling (as described above in Assessment and Plan/Instructions) /coordinating the care.    Anais Justice MD  Neurology    CC: Katie Gross MD      Again, thank you for allowing me to participate in the care of your patient.        Sincerely,        Anais Justice MD

## 2018-11-12 NOTE — PROGRESS NOTES
INITIAL NEUROLOGY CONSULTATION    DATE OF VISIT: 11/12/2018  MRN: [id number]  PATIENT NAME: Antoine Russo  YOB: 1945    REFERRING PROVIDER: No ref. provider found    Chief Complaint   Patient presents with     New Patient     Neuropathy.  Referral by Katie Gross MD.       SUBJECTIVE:                                                      HPI:   Antoine Russo is a 72 year old male whoWhom I was asked by Dr. Gross to see in consultation for ziniging pain in the feet. The patient has been seen in podiatry, but this was several years ago and per Dr. Ventura s note (2013) there was not exam evidence of neuropathy at the time. The patient does have relevant history of diabetes. He also has atrial fibrillation (on warfarin), HLD, HTN, CAD.      The patient tells me he was diagnosed with neuropathy by his primary care provider years ago. He has pain/tingling and numbness in the feet. He says his toes are starting to curl a little on the Left foot only, as well which is new. It only affects the feet, symptoms do not occur proximally to the ankles. No symptoms in the hands. He as not noticed weakness in the feet. No tripping over the feet or falls. He says that the zingers have gotten worse. The feet feel numb and walking on the bare floor is painful. No nerve conduction studies/EMG on record.     His HbA1c was 6.9% last month. He was in the hospital last month for recurrence of pancreatitis.  He does not really drink much alcohol now and describes that he has cut back from 3-4 beers per night.      He says the gabapentin did initially help with the zinging. He takes 300mg Qam and 600mg Qpm. He denies side effects from the medication.     He has had several surgeries for squamous cell cancer (glossectomy). No chemotherapy or radiation treatments.  The patient mentions that he was not happy with his prior consultation with podiatry.     No one in the family with neuropathy or other neurologic  problems, per patient. Chart indicates dementia in both parents.     Past Medical History:   Diagnosis Date     C. 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     Noninfectious ileitis      Recurrent pancreatitis (H)      Past Surgical History:   Procedure Laterality Date     BREAST SURGERY  2008    right breast mass benign     COLONOSCOPY      multiple polyps removed     COLONOSCOPY  8/24/2011    Procedure:COMBINED COLONOSCOPY, REMOVE TUMOR/POLYP/LESION BY SNARE; Surgeon:MILEY ARBOLEDA; Location:[facility]     COLONOSCOPY  12/17/2012    Procedure: COLONOSCOPY;;  Surgeon: Leon Maurer MD;  Location: [facility]     COLONOSCOPY  12/18/2012    Procedure: COLONOSCOPY;;  Surgeon: Leon Maurer MD;  Location: [facility]     DENERVATION OF SPERMATIC CORD MICROSURGICAL Left 5/23/2017    Procedure: DENERVATION OF SPERMATIC CORD MICROSURGICAL;;  Surgeon: Marcio Aggarwal MD;  [facility]     DISSECTION RADICAL NECK BILATERAL  8/2/2012    Procedure: DISSECTION RADICAL NECK BILATERAL;;  Surgeon: Yung Alvares MD;  [facility]     ENDOSCOPIC RETROGRADE CHOLANGIOPANCREATOGRAM N/A 5/10/2016    Procedure: COMBINED ENDOSCOPIC RETROGRADE CHOLANGIOPANCREATOGRAPHY, PLACE TUBE/STENT;  Surgeon: Yovanny Beasley MD;  [facility]     ENDOSCOPIC RETROGRADE CHOLANGIOPANCREATOGRAM N/A 3/29/2018    Procedure: ENDOSCOPIC RETROGRADE CHOLANGIOPANCREATOGRAM;  Endoscopic Retrograde Cholangiopancreatogram, Endoscopic Ultrasound, Biliary Sphincterotomy, Biliary and Pancreatic Stent Placement;  Surgeon: Yovanny Beasley MD;  [facility]     ENDOSCOPIC ULTRASOUND UPPER GASTROINTESTINAL TRACT (GI) N/A 2/3/2016    Procedure: ENDOSCOPIC ULTRASOUND, ESOPHAGOSCOPY / UPPER GASTROINTESTINAL TRACT (GI);  Surgeon: Grabiel Plata MD;  [facility]      ENDOSCOPIC ULTRASOUND UPPER GASTROINTESTINAL TRACT (GI) N/A 3/29/2018    Procedure: ENDOSCOPIC ULTRASOUND, ESOPHAGOSCOPY / UPPER GASTROINTESTINAL TRACT (GI);;  Surgeon: Grabiel Plata MD;  [facility]     ESOPHAGOSCOPY, GASTROSCOPY, DUODENOSCOPY (EGD), COMBINED N/A 2/3/2016    Procedure: COMBINED ENDOSCOPIC ULTRASOUND, ESOPHAGOSCOPY, GASTROSCOPY, DUODENOSCOPY (EGD), FINE NEEDLE ASPIRATE/BIOPSY;  Surgeon: Grabiel Plata MD;  [facility]     ESOPHAGOSCOPY, GASTROSCOPY, DUODENOSCOPY (EGD), COMBINED N/A 6/8/2016    Procedure: COMBINED ESOPHAGOSCOPY, GASTROSCOPY, DUODENOSCOPY (EGD), REMOVE FOREIGN BODY;  Surgeon: Yovanny Beasley MD;  [facility]     ESOPHAGOSCOPY, GASTROSCOPY, DUODENOSCOPY (EGD), COMBINED N/A 4/17/2018    Procedure: COMBINED ESOPHAGOSCOPY, GASTROSCOPY, DUODENOSCOPY (EGD), REMOVE FOREIGN BODY;  EGD with stent removal;  Surgeon: Grabiel Plata MD;  [facility]     EXCISE LESION INTRAORAL  6/14/2012    Procedure: EXCISE LESION INTRAORAL;  Wide Local Excision Floor of Mouth, Direct Laryngoscopy, Bilateral Ida's Marsuplization, Split Thickness Skin Graft from right Thigh  Latex Safe;  Surgeon: Gerson Ravi MD;  [facility]     EXCISE LESION INTRAORAL  8/2/2012    Procedure: EXCISE LESION INTRAORAL;  Floor of Mouth Resection, Bilateral Selective Radical Neck Dissection, Tracheostomy, Left Radial Forearm  Free Flap with Alloderm, Nasogastric Feeding Tube Placement,    * Latex Safe*;  Surgeon: Gerson Ravi MD;  [facility]     EXCISE LESION INTRAORAL  12/11/2012    Procedure: EXCISE LESION INTRAORAL;  takedown of oral flap;  Surgeon: Yung Alvares MD;  [facility]     GRAFT FREE VASCULARIZED (LOCATION)  8/2/2012    Procedure: GRAFT FREE VASCULARIZED (LOCATION);;  Surgeon: Yung Alvares MD;  [facility]     GRAFT SKIN SPLIT THICKNESS FROM EXTREMITY  6/14/2012    Procedure: GRAFT SKIN SPLIT THICKNESS FROM EXTREMITY;;  Surgeon: Gerson Ravi,  MD;  [facility]     LAPAROSCOPIC ILEOSTOMY TAKEDOWN  6/6/2013    Procedure: LAPAROSCOPIC ILEOSTOMY TAKEDOWN;  Laparoscopic Closure of Enterostomy, Guerda's Type with IleoRectal Anastomosis ;  Surgeon: Grabiel Riddle MD;  [facility]     LAPAROTOMY EXPLORATORY  12/20/2012    Procedure: LAPAROTOMY EXPLORATORY;  Exploratory Laparotomy, total abdominal colectomy, ileostomy formation;  Surgeon: Miquel Cannon MD;  [facility]     LARYNGOSCOPY  6/14/2012    Procedure: LARYNGOSCOPY;;  Surgeon: Gerson Ravi MD;  [facility]     ORTHOPEDIC SURGERY      ganglian cyst left ankle     PANCREATECTOMY, SPLENECTOMY N/A 3/10/2016    Procedure: PANCREATECTOMY, SPLENECTOMY;  Surgeon: Nael Abel MD;  [facility]     SHOULDER SURGERY  2006, 2008    2006- right rotator cuff, 2008 bone spur on left. Dr. Hdez     VARICOCELECTOMY Left 5/23/2017    Procedure: VARICOCELECTOMY;  Left Varicocele Repair, Denervation of Left Testis;  Surgeon: Marcio Aggarwal MD;  [facility]         Current Outpatient Prescriptions on File Prior to Visit:  lisinopril (PRINIVIL/ZESTRIL) 5 MG tablet TAKE 1 TABLET BY MOUTH ONCE DAILY   metFORMIN (GLUCOPHAGE-XR) 500 MG 24 hr tablet TAKE TWO TABLETS BY MOUTH TWICE DAILY WITH MEALS   metoprolol succinate (TOPROL-XL) 50 MG 24 hr tablet Take 1.5 tablets (75 mg) by mouth 2 times daily   multivitamin, therapeutic with minerals (THERA-VIT-M) TABS Take 1 tablet by mouth daily.   tamsulosin (FLOMAX) 0.4 MG capsule Take 1 capsule (0.4 mg) by mouth daily   warfarin (COUMADIN) 2.5 MG tablet Take 2.5 mg by mouth daily 2.5 mg daily or as directed by Anticoagulation Clinic   ASPIRIN NOT PRESCRIBED (INTENTIONAL) Antiplatelet medication not prescribed intentionally due to Current anticoagulant therapy (warfarin/enoxaparin)   warfarin (COUMADIN) 2.5 MG tablet 2.5 mg daily or as directed by [facility] (Patient not taking: Reported on 11/12/2018)   [DISCONTINUED] gabapentin (NEURONTIN) 300 MG  capsule Take one in am, and 2 tablest (600 mg) every night     No current facility-administered medications on file prior to visit.   Allergies   Allergen Reactions     Nkda [No Known Drug Allergies]         Problem (# of Occurrences) Relation (Name,Age of Onset)    Alzheimer Disease (2) Mother:  80, Father:  85    Aneurysm (1) Sister    Diabetes (3) Sister: onset age 50, Other: nephew type 1, Other       Negative family history of: Anesthesia Reaction, Colon Cancer, Colon Polyps, Crohn Disease, Ulcerative Colitis        Social History   Substance Use Topics     Smoking status: Former Smoker     Packs/day: 1.00     Years: 40.00     Types: Cigarettes     Quit date: 2006     Smokeless tobacco: Never Used     Alcohol use 16.8 oz/week     28 Cans of beer per week       REVIEW OF SYSTEMS:                                                      10-point review of systems is negative except as mentioned above in HPI.     EXAM:                                                      Physical Exam:   Vitals: /57 (BP Location: Left arm, Patient Position: Sitting, Cuff Size: Adult Regular)  Pulse 72  Temp 97.5  F (36.4  C) (Tympanic)  Resp 12  Wt 87 kg (191 lb 12.8 oz)  BMI 27.52 kg/m2  BMI= Body mass index is 27.52 kg/(m^2).  GENERAL: NAD.   HEENT: NC/AT. Multiple scars along the anterior neck.  CV: RRR. S1 and S2.  Neurologic:  MENTAL STATUS: Alert, attentive. Speech is fluent, with normal naming repetition comprehension.  Normal  concentration. Adequate fund of knowledge.   CRANIAL NERVES: Visual fields intact to confrontation. Pupils equally, round and reactive to light. Facial sensation and movement normal. EOM full. Hearing intact to conversation. Trapezius strength intact. Tongue midline, limited protrusion (Hx of Sx).  MOTOR: 5/5 in proximal and distal muscle groups of upper and lower extremities. Tone and bulk normal.   DTRs: Intact and symmetric in UEs. 2+ at knees. Trace ankle jerks. Babinski  down-going bilaterally.   SENSATION: Normal light touch and pinprick in hands. Cannot feel pinprick distal to mid-calf. Poor proprioception at great toes. Vibration: Diminished at both ankles.   COORDINATION: Normal finger nose finger. Finger tapping normal.  Knee heel shin normal.  STATION AND GAIT: Romberg negative. Tandem - patient refuses. Casual gait is antalgic.  EXTR: Feet are flat. Slight contracture of toes on Left foot. Right hand-dominant.     ASSESSMENT and PLAN:                                                      Assessment and Plan:     ICD-10-CM    1. Peripheral polyneuropathy G62.9 ORTHO  REFERRAL   2. Type 2 diabetes mellitus with diabetic polyneuropathy, without long-term current use of insulin (H) E11.42 gabapentin (NEURONTIN) 300 MG capsule        Mr. Russo is a pleasant 71 yo man seen in neurology today for symptoms consistent with peripheral neuropathy, likely related to his diabetes and possibly, I suspect, heavy alcohol use in the past. He also has musculoskeletal deformities of the feet, which are likely contributing to his pain. I will defer re-referral back to podiatry to the primary care provider, if felt to be appropriate. I think the patient would benefit from orthotics.    We discussed alternative causes for neuropathy as well, but given the history and current symptoms, I do not think we need to additional blood work at this time. I would like to confirm a length-dependent process with electrodiagnostic testing and try increasing the gabapentin for symptomatic management. The patient understands and agrees with the plan.     Patient Instructions:  -- Increase the gabapentin to 300mg in the morning and 300mg midday dose and 900mg in the evening. Monitor for side effects. We can increase further if this dose schedule does not calm your symptoms.  -- Nerve conduction studies/EMG. Based on the results, I will let you know if any additional test are recommended.   -- The  neuropathy is very likely related to the diabetes, as you know, so tight glucose control is also important. This can be managed through your primary care provider.   -- Return to clinic in 3 months.     Total Time: 45 minutes were spent with the patient. More than 50% of the time spent on counseling (as described above in Assessment and Plan/Instructions) /coordinating the care.    Anais Justice MD  Neurology    CC: Katie Gross MD

## 2018-11-12 NOTE — NURSING NOTE
Patient prefers to be contacted: Sarah Nagyay to leave detailed message on voicemail: n/a     Velma ROBERTSONA

## 2018-11-13 ENCOUNTER — TELEPHONE (OUTPATIENT)
Dept: NEUROLOGY | Facility: CLINIC | Age: 73
End: 2018-11-13

## 2018-11-13 DIAGNOSIS — G62.9 PERIPHERAL POLYNEUROPATHY: Primary | ICD-10-CM

## 2018-11-13 RX ORDER — GABAPENTIN 600 MG/1
600 TABLET ORAL AT BEDTIME
Qty: 30 TABLET | Refills: 3 | Status: SHIPPED | OUTPATIENT
Start: 2018-11-13 | End: 2019-04-15

## 2018-11-13 RX ORDER — GABAPENTIN 300 MG/1
300 CAPSULE ORAL 3 TIMES DAILY
Qty: 90 CAPSULE | Refills: 3 | Status: SHIPPED | OUTPATIENT
Start: 2018-11-13 | End: 2019-01-17

## 2018-11-13 NOTE — TELEPHONE ENCOUNTER
Anais Guajardo MD   You 18 minutes ago (12:07 PM)                 Can you check into this? So, they are requesting 600mg tabs? He is going to have to break them in half for the am and midday dose and then do 1.5 tabs in the evening? IThis is the only way he can do 300/300/900 with what they are requesting. Seems senseless to me. Especially because we may need to adjust the dose further in the near future.     Thank you,   LISA (Routing comment)         Let's try a combination of the two (cued up).  If we need to increase in the near future, we can go to 300mg bid and 600mg bid, which should fall in the parameters.

## 2018-11-13 NOTE — TELEPHONE ENCOUNTER
Reason for Call:  Other call back    Detailed comments: [facility] insurance calling regarding pt's rx for Gabapentin.  Taking 300 mg in the morning 300mg at lunch and 900 at bedtime.  [facility] - put a limit on maximum amount - rx cannot be filled unless it is in those limitations.  Limitations are on the 300mg tab - max 90 in 23 day period.  If written 600 mg cd be 180 in a 23 day period.    If any question please call Telma at [facility] [phone]    Or [facility] - if you want to do a PA- request qty limit override call [phone]    Phone Number Patient can be reached at: See phone contact    Best Time:     Can we leave a detailed message on this number? yes    Call taken on 11/13/2018 at 9:33 AM by Akilah Machado

## 2018-11-14 ENCOUNTER — TRANSFERRED RECORDS (OUTPATIENT)
Dept: HEALTH INFORMATION MANAGEMENT | Facility: CLINIC | Age: 73
End: 2018-11-14

## 2018-11-23 ENCOUNTER — HOSPITAL ENCOUNTER (INPATIENT)
Facility: CLINIC | Age: 73
LOS: 3 days | Discharge: HOME | End: 2018-11-27
Attending: EMERGENCY MEDICINE | Admitting: FAMILY MEDICINE
Payer: COMMERCIAL

## 2018-11-23 DIAGNOSIS — I48.20 CHRONIC ATRIAL FIBRILLATION (H): Chronic | ICD-10-CM

## 2018-11-23 DIAGNOSIS — K85.20 ALCOHOL-INDUCED ACUTE PANCREATITIS WITHOUT INFECTION OR NECROSIS: Primary | ICD-10-CM

## 2018-11-23 DIAGNOSIS — Z79.01 LONG TERM CURRENT USE OF ANTICOAGULANT THERAPY: ICD-10-CM

## 2018-11-23 DIAGNOSIS — K85.90 ACUTE PANCREATITIS, UNSPECIFIED COMPLICATION STATUS, UNSPECIFIED PANCREATITIS TYPE: ICD-10-CM

## 2018-11-23 LAB
ALBUMIN SERPL-MCNC: 3.6 G/DL (ref 3.4–5)
ALP SERPL-CCNC: 56 U/L (ref 40–150)
ALT SERPL W P-5'-P-CCNC: 26 U/L (ref 0–70)
ANION GAP SERPL CALCULATED.3IONS-SCNC: 7 MMOL/L (ref 3–14)
AST SERPL W P-5'-P-CCNC: 24 U/L (ref 0–45)
BASOPHILS # BLD AUTO: 0 10E9/L (ref 0–0.2)
BASOPHILS NFR BLD AUTO: 0.2 %
BILIRUB SERPL-MCNC: 0.4 MG/DL (ref 0.2–1.3)
BUN SERPL-MCNC: 21 MG/DL (ref 7–30)
CALCIUM SERPL-MCNC: 8.9 MG/DL (ref 8.5–10.1)
CHLORIDE SERPL-SCNC: 102 MMOL/L (ref 94–109)
CO2 SERPL-SCNC: 27 MMOL/L (ref 20–32)
CREAT SERPL-MCNC: 0.89 MG/DL (ref 0.66–1.25)
DIFFERENTIAL METHOD BLD: ABNORMAL
EOSINOPHIL # BLD AUTO: 0.1 10E9/L (ref 0–0.7)
EOSINOPHIL NFR BLD AUTO: 0.7 %
ERYTHROCYTE [DISTWIDTH] IN BLOOD BY AUTOMATED COUNT: 14.9 % (ref 10–15)
GFR SERPL CREATININE-BSD FRML MDRD: 85 ML/MIN/1.7M2
GLUCOSE SERPL-MCNC: 269 MG/DL (ref 70–99)
HCT VFR BLD AUTO: 43.1 % (ref 40–53)
HGB BLD-MCNC: 14 G/DL (ref 13.3–17.7)
IMM GRANULOCYTES # BLD: 0.1 10E9/L (ref 0–0.4)
IMM GRANULOCYTES NFR BLD: 0.6 %
INR PPP: 1.94 (ref 0.86–1.14)
LACTATE BLD-SCNC: 1.7 MMOL/L (ref 0.7–2)
LIPASE SERPL-CCNC: 1109 U/L (ref 73–393)
LYMPHOCYTES # BLD AUTO: 2.2 10E9/L (ref 0.8–5.3)
LYMPHOCYTES NFR BLD AUTO: 12.5 %
MCH RBC QN AUTO: 32.2 PG (ref 26.5–33)
MCHC RBC AUTO-ENTMCNC: 32.5 G/DL (ref 31.5–36.5)
MCV RBC AUTO: 99 FL (ref 78–100)
MONOCYTES # BLD AUTO: 1 10E9/L (ref 0–1.3)
MONOCYTES NFR BLD AUTO: 5.4 %
NEUTROPHILS # BLD AUTO: 14.2 10E9/L (ref 1.6–8.3)
NEUTROPHILS NFR BLD AUTO: 80.6 %
NRBC # BLD AUTO: 0 10*3/UL
NRBC BLD AUTO-RTO: 0 /100
PLATELET # BLD AUTO: 207 10E9/L (ref 150–450)
POTASSIUM SERPL-SCNC: 4.1 MMOL/L (ref 3.4–5.3)
PROT SERPL-MCNC: 7.9 G/DL (ref 6.8–8.8)
RBC # BLD AUTO: 4.35 10E12/L (ref 4.4–5.9)
SODIUM SERPL-SCNC: 136 MMOL/L (ref 133–144)
WBC # BLD AUTO: 17.6 10E9/L (ref 4–11)

## 2018-11-23 PROCEDURE — 96375 TX/PRO/DX INJ NEW DRUG ADDON: CPT | Performed by: EMERGENCY MEDICINE

## 2018-11-23 PROCEDURE — 85610 PROTHROMBIN TIME: CPT | Performed by: EMERGENCY MEDICINE

## 2018-11-23 PROCEDURE — G0378 HOSPITAL OBSERVATION PER HR: HCPCS

## 2018-11-23 PROCEDURE — 83036 HEMOGLOBIN GLYCOSYLATED A1C: CPT | Performed by: PHYSICIAN ASSISTANT

## 2018-11-23 PROCEDURE — 96374 THER/PROPH/DIAG INJ IV PUSH: CPT | Performed by: EMERGENCY MEDICINE

## 2018-11-23 PROCEDURE — 25000128 H RX IP 250 OP 636: Performed by: EMERGENCY MEDICINE

## 2018-11-23 PROCEDURE — 99285 EMERGENCY DEPT VISIT HI MDM: CPT | Mod: Z6 | Performed by: EMERGENCY MEDICINE

## 2018-11-23 PROCEDURE — 99285 EMERGENCY DEPT VISIT HI MDM: CPT | Mod: 25 | Performed by: EMERGENCY MEDICINE

## 2018-11-23 PROCEDURE — 85025 COMPLETE CBC W/AUTO DIFF WBC: CPT | Performed by: EMERGENCY MEDICINE

## 2018-11-23 PROCEDURE — 83605 ASSAY OF LACTIC ACID: CPT | Performed by: EMERGENCY MEDICINE

## 2018-11-23 PROCEDURE — 80053 COMPREHEN METABOLIC PANEL: CPT | Performed by: EMERGENCY MEDICINE

## 2018-11-23 PROCEDURE — 96361 HYDRATE IV INFUSION ADD-ON: CPT | Performed by: EMERGENCY MEDICINE

## 2018-11-23 PROCEDURE — 83690 ASSAY OF LIPASE: CPT | Performed by: EMERGENCY MEDICINE

## 2018-11-23 RX ORDER — KETOROLAC TROMETHAMINE 15 MG/ML
15 INJECTION, SOLUTION INTRAMUSCULAR; INTRAVENOUS ONCE
Status: COMPLETED | OUTPATIENT
Start: 2018-11-23 | End: 2018-11-23

## 2018-11-23 RX ORDER — ONDANSETRON 2 MG/ML
4 INJECTION INTRAMUSCULAR; INTRAVENOUS ONCE
Status: COMPLETED | OUTPATIENT
Start: 2018-11-23 | End: 2018-11-23

## 2018-11-23 RX ADMIN — HYDROMORPHONE HYDROCHLORIDE 1 MG: 1 INJECTION, SOLUTION INTRAMUSCULAR; INTRAVENOUS; SUBCUTANEOUS at 19:51

## 2018-11-23 RX ADMIN — KETOROLAC TROMETHAMINE 15 MG: 15 INJECTION, SOLUTION INTRAMUSCULAR; INTRAVENOUS at 19:51

## 2018-11-23 RX ADMIN — SODIUM CHLORIDE 1000 ML: 9 INJECTION, SOLUTION INTRAVENOUS at 19:03

## 2018-11-23 RX ADMIN — ONDANSETRON 4 MG: 2 INJECTION INTRAMUSCULAR; INTRAVENOUS at 19:03

## 2018-11-23 ASSESSMENT — ENCOUNTER SYMPTOMS
FEVER: 0
SHORTNESS OF BREATH: 0
ABDOMINAL PAIN: 1

## 2018-11-23 NOTE — IP AVS SNAPSHOT
MRN:[id number]                      After Visit Summary   11/23/2018    Antoine Russo    MRN: [id number]           Thank you!     Thank you for choosing [facility] for your care. Our goal is always to provide you with excellent care. Hearing back from our patients is one way we can continue to improve our services. Please take a few minutes to complete the written survey that you may receive in the mail after you visit with us. Thank you!        Patient Information     Date Of Birth          1945        About your hospital stay     You were admitted on:  November 23, 2018 You last received care in the:  [facility]    You were discharged on:  November 27, 2018        Reason for your hospital stay       You developed pancreatitis again, which caused pain, fever, and multiple lab alterations.  The inflammation in your pancreas has slowly resolved, you're pain-free and eating again, so you're ready for discharge home today.                  Who to Call     For medical emergencies, please call 911.  For non-urgent questions about your medical care, please call your primary care provider or clinic, [phone]          Attending Provider     Provider Specialty    Catrachito Gould MD Emergency Medicine    [facility], Zac SANTOS MD Family [facility], Willam LOPEZ MD [facility]    [place], Baldo Pride MD Pulmonary       Primary Care Provider Office Phone # Fax #    Katie Gross [phone]      After Care Instructions     Activity       Your activity upon discharge: activity as tolerated.            Diet       Follow this diet upon discharge: Orders Placed This Encounter      Moderate Consistent Carbohydrate Diet                  Follow-up Appointments     Follow-up and recommended labs and tests        Follow up with primary care provider, Katie Gross, within 7 days for hospital follow- up.  The following labs/tests are recommended: CBC  (future) ordered to be done about one week from discharge.                  Your next 10 appointments already scheduled     Dec 27, 2018  9:45 AM CST   Anticoagulation Visit with NB ANTI COAG   [facility]    [address] [place] [id number] [phone]            Jan 17, 2019  8:00 AM CST   [facility] Visit with Anais Justice MD   [facility]    [address] [facility] [address] [phone]              Future tests that were ordered for you     CBC with platelets       Last Lab Result: Hemoglobin (g/dL)       Date                     Value                 11/26/2018               11.7 (L)         ----------                  Further instructions from your care team       Behavioral/Mental Health Resources  [place], [place], [facility], [place], [place], [place], [place]oberto Galan, [place] and [facility]    **Patient should check with their health insurance to identify providers in network**    Crisis Lines:   Text 269677 from anywhere in the [place] to text with a trained Crisis Counselor   -[place]: [place] Crisis Connection: (427) 498-8636/1-187.500.5863   -[place]: (790) [phone]    -[facility]/ [place]/ [place]/ [place]/ [place]: [phone]   -[facility]: [facility] Crisis: [phone]   -[place] and [facility]: [facility] Crisis Team [phone] [phone] or  [phone]     Call the National Suicide Prevention Lifeline at 1-009-754-KAAR (2499) to be connected to a  counselor at a crisis center in your area if you, a family member, or friend are experiencing   thoughts of suicide. The call is FREE, confidential, and always available.       [place] ([place]) provides support groups and  educational programs. Visit www.namihelps.org or call the [place] Helpline at 1-963.270.2030  Or 576-192-0270 for further information.       Crisis  Mobile Serivces:   -[place]: Ge[employer]oo [phone]   -[facility]/ [place]/ [place]/ [place]/ [place]: [phone]   -[place]: [employer]oo [phone]   -[place] and [facility]: [phone] or [phone]    Chemical Dependency Detox:  - [facility]:  [phone] - can ask for other recommendations  - [facility]/[place]([facility]):  [phone]  - [facility]: [phone] [facility]  - [facility] ([facility]):  [phone]  - [facility] : [phone] [facility]  -  [place] s ([facility]):  [phone]  - [place] ([facility]):  [phone]    Chemical Dependency Assessment:   - [place] [facility]: [phone]   - Behavioral Health Providers, can help find a provider near you:  [phone]  - No insurance- call county of residence and ask about applying for a Rule 25    Counseling/psychotherapy:  - Associated clinic of psychology - [place],/[place]/ \[facility]\""/[place] /other locations: [phone]  - Behavioral Healthcare Providers- Can help find a provider near you:  [phone]  - [facility] Services-[place]:  [phone]  - [facility]- [place]:  [phone]  - [facility]- [place]:  [phone]  - [facility]-[place]: [phone]  - [facility]- [place]/[facility]/other locations:  [phone]  - [facility]- [facility]/[place]:  [phone]  - [facility] - [place]:  [phone]  - [facility] - [place]:  [phone]  [facility]- [place]-based in [place]:  [phone]  - [place] [phone]  - [facility]- [place]: [phone]  - [facility]- [facility]:  [phone]  - [facility]- [place] [phone]  - [facility] counseling located in [facility]  [place] (not affiliated with [place]): [phone]  - [facility]- [place]:  [phone]/[place]: [phone] and other locations.  - [facility] and [place]- [place]: [phone]  - Psychiatric Recovery- [place]:  [phone]  - [facility] Services [facility]- [place]: [phone]  - Walk in counseling center (Free counseling services)- [facility]: [phone]     Psychiatry/ Mental Health Medication management:  - Associated clinic of psychology- [place],/[place]/[facility]/ [place]/ [facility] locations: [phone]  - Behavioral Healthcare Providers- Can help find a provider near you, if you have preferred one or [facility] they can identify who is in network and assist with scheduling an appointment:  [phone]  - [facility]: [place]/[facility] locations : [phone]  - [facility] - Dr Catrachito Vigil and other associates. Collaborative model  with PCP involvement:  [phone] (requires PCP referral specifically)  - [facility]- [place]:  [phone]  - [facility]: [place]:   [phone] (Requires individual to be engaged in counseling)  - [facility]- [place]: [phone] [place]/[facility]:  [phone]/[place]:  [phone]/ [place]:  [phone]  - Psych[facility]- [place]:   [phone]  - [facility]- [place], [phone]  - [facility] Psychiatry - Medical students who rotate yearly:  [phone]    County Numbers  - [facility]: [phone]  - [facility]: [phone]  - [facility]: [phone]  - [facility] : [phone]  - [facility]: [phone]  - [facility]: [phone]  - [facility]: [phone]  - [facility]: [phone]  - [facility]: [phone]  - [facility]: [phone]    **Please note that this list does not include all agencies that provide  "services**    Care Transitions Team at [facility] [phone]      Coumadin Discharge Instructions:  1. Your INR was 1.89 today. Take Coumadin 5 mg today (11/27), then resume normal dosing schedule of Coumadin 2.5 mg daily.  2. Recheck INR in 4 weeks on Thurs 12/27/18 at 9:45 am in [facility].     Pending Results     Date and Time Order Name Status Description    11/25/2018 2104 Blood culture Preliminary     11/25/2018 2104 Blood culture Preliminary             Statement of Approval     Ordered          11/27/18 1109  I have reviewed and agree with all the recommendations and orders detailed in this document.  EFFECTIVE NOW     Approved and electronically signed by:  Blado Ashford MD             Admission Information     Date & Time Provider Department Dept. Phone    11/23/2018 Baldo [facility]ford MD [facility] [place] [facility] Surgical [phone]      Your Vitals Were     Blood Pressure Pulse Temperature Respirations Height Weight    121/71 (BP Location: Left arm) 102 98.2  F (36.8  C) 16 1.778 m (5' 10\") 88.2 kg (194 lb 7.1 oz)    Pulse Oximetry BMI (Body Mass Index)                92% 27.9 kg/m2          [facility] Information     [facility] gives you secure access to your electronic health record. If you see a primary care provider, you can also send messages to your care team and make appointments. If you have questions, please call your primary care clinic.  If you do not have a primary care provider, please call [phone] and they will assist you.        Care EveryWhere ID     This is your Care EveryWhere ID. This could be used by other organizations to access your [facility] medical records  [id number]        Equal Access to Services     ABBEY WALTON : Raquel Bernstein, charli block, koffi adler. So [facility] [phone].    ATENCIÓN: Si habla español, tiene a snyder disposición servicios " tim de asistencia lingüística. Kia kuhn [phone].    We comply with applicable federal civil rights laws and [place] laws. We do not discriminate on the basis of race, color, national origin, age, disability, sex, sexual orientation, or gender identity.               Review of your medicines      CONTINUE these medicines which have NOT CHANGED        Dose / Directions    ASPIRIN NOT PRESCRIBED   Commonly known as:  INTENTIONAL        Antiplatelet medication not prescribed intentionally due to Current anticoagulant therapy (warfarin/enoxaparin)   Refills:  0       * gabapentin 600 MG tablet   Commonly known as:  NEURONTIN   Used for:  Peripheral polyneuropathy        Dose:  600 mg   Take 1 tablet (600 mg) by mouth At Bedtime Along with 300mg for a total of 900mg for bedtime dose   Quantity:  30 tablet   Refills:  3       * gabapentin 300 MG capsule   Commonly known as:  NEURONTIN   Used for:  Peripheral polyneuropathy        Dose:  300 mg   Take 1 capsule (300 mg) by mouth 3 times daily Take one tablet (300mg) three times daily.   Quantity:  90 capsule   Refills:  3       lisinopril 5 MG tablet   Commonly known as:  PRINIVIL/ZESTRIL   Used for:  Hypertension, benign essential, goal below 140/90        TAKE 1 TABLET BY MOUTH ONCE DAILY   Quantity:  90 tablet   Refills:  1       metFORMIN 500 MG 24 hr tablet   Commonly known as:  GLUCOPHAGE-XR   Used for:  Type 2 diabetes mellitus with diabetic polyneuropathy, without long-term current use of insulin (H)        TAKE TWO TABLETS BY MOUTH TWICE DAILY WITH MEALS   Quantity:  360 tablet   Refills:  0       metoprolol succinate 50 MG 24 hr tablet   Commonly known as:  TOPROL-XL   Used for:  Hypertension, benign essential, goal below 140/90        Dose:  75 mg   Take 1.5 tablets (75 mg) by mouth 2 times daily   Quantity:  270 tablet   Refills:  3       multivitamin w/minerals tablet   Used for:  Surgery aftercare        Dose:  1 tablet   Take 1 tablet by mouth  daily.   Quantity:  30 each   Refills:  1       tamsulosin 0.4 MG capsule   Commonly known as:  FLOMAX   Used for:  Urgency of urination, Hypertrophy of prostate without urinary obstruction        Dose:  0.4 mg   Take 1 capsule (0.4 mg) by mouth daily   Quantity:  90 capsule   Refills:  3       warfarin 2.5 MG tablet   Commonly known as:  COUMADIN   Used for:  Chronic atrial fibrillation (H)        2.5 mg daily or as directed by ACC   Quantity:  90 tablet   Refills:  0       * Notice:  This list has 2 medication(s) that are the same as other medications prescribed for you. Read the directions carefully, and ask your doctor or other care provider to review them with you.             Protect others around you: Learn how to safely use, store and throw away your medicines at www.disposemInstagarageeds.org.             Medication List: This is a list of all your medications and when to take them. Check marks below indicate your daily home schedule. Keep this list as a reference.      Medications           Morning Afternoon Evening Bedtime As Needed    ASPIRIN NOT PRESCRIBED   Commonly known as:  INTENTIONAL   Antiplatelet medication not prescribed intentionally due to Current anticoagulant therapy (warfarin/enoxaparin)                                * gabapentin 600 MG tablet   Commonly known as:  NEURONTIN   Take 1 tablet (600 mg) by mouth At Bedtime Along with 300mg for a total of 900mg for bedtime dose   Last time this was given:  600 mg on 11/26/2018  9:48 PM                                * gabapentin 300 MG capsule   Commonly known as:  NEURONTIN   Take 1 capsule (300 mg) by mouth 3 times daily Take one tablet (300mg) three times daily.   Last time this was given:  300 mg on 11/27/2018  7:50 AM                                lisinopril 5 MG tablet   Commonly known as:  PRINIVIL/ZESTRIL   TAKE 1 TABLET BY MOUTH ONCE DAILY   Last time this was given:  5 mg on 11/27/2018  7:48 AM                                metFORMIN 500 MG  24 hr tablet   Commonly known as:  GLUCOPHAGE-XR   TAKE TWO TABLETS BY MOUTH TWICE DAILY WITH MEALS   Last time this was given:  1,000 mg on 11/27/2018  7:48 AM                                metoprolol succinate 50 MG 24 hr tablet   Commonly known as:  TOPROL-XL   Take 1.5 tablets (75 mg) by mouth 2 times daily   Last time this was given:  75 mg on 11/27/2018  7:48 AM                                multivitamin w/minerals tablet   Take 1 tablet by mouth daily.   Last time this was given:  1 tablet on 11/27/2018  7:49 AM                                tamsulosin 0.4 MG capsule   Commonly known as:  FLOMAX   Take 1 capsule (0.4 mg) by mouth daily   Last time this was given:  0.4 mg on 11/27/2018  7:49 AM                                warfarin 2.5 MG tablet   Commonly known as:  COUMADIN   2.5 mg daily or as directed by ACC   Last time this was given:  3 mg on 11/26/2018  6:22 PM                                * Notice:  This list has 2 medication(s) that are the same as other medications prescribed for you. Read the directions carefully, and ask your doctor or other care provider to review them with you.

## 2018-11-23 NOTE — LETTER
Transition Communication Hand-off for Care Transitions to Next Level of Care Provider    Name: Antoine Russo  : 1945  MRN #: [id number]  Primary Care Provider: Katie Gross  Primary Care MD Name: Dr. Katie Gross  Primary Clinic: [address] [facility] [address]  Primary Care Clinic Name: [facility]  Reason for Hospitalization:  Chronic atrial fibrillation (H) [I48.2]  Pancreatitis, recurrent (H) [K86.1]  Long term current use of anticoagulant therapy [Z79.01]  Acute pancreatitis, unspecified complication status, unspecified pancreatitis type [K85.90]  Admit Date/Time: 2018  6:47 PM  Discharge Date: 18  Payor Source: Payor: BCBS / Plan: BCBS OUT OF STATE / Product Type: Indemnity /     Readmission Assessment Measure (CARLOS ENRIQUE) Risk Score/category: Average         Reason for Communication Hand-off Referral: Fragility    Discharge Plan: Patient will be discharging home.        Concern for non-adherence with plan of care:   Y/N no  Discharge Needs Assessment:  Needs       Most Recent Value    Transportation Available family or friend will provide          Follow-up specialty is recommended: No    Follow-up plan:  Future Appointments  Date Time Provider Department Center   2018 9:00 AM Katie Gross MD [facility]   2018 9:45 AM NB ANTI COAG [facility]   2019 8:00 AM Anais Guajardo MD [facility]               Luis Recommendations:  Patient will be returning home with his wife, Amina. Patients plan is to remain home. Patient was given CD resources in discharge paperwork.     Carol Ann Gloria  Social Work [facility] [phone]  [facility] [phone]      AVS/Discharge Summary is the source of truth; this is a helpful guide for improved communication of patient story

## 2018-11-23 NOTE — IP AVS SNAPSHOT
[facility]    [address] [facility] [address]    Phone:  [phone]    Fax:  [phone]                                       After Visit Summary   11/23/2018    Antoine Russo    MRN: [id number]           After Visit Summary Signature Page     I have received my discharge instructions, and my questions have been answered. I have discussed any challenges I see with this plan with the nurse or doctor.    ..........................................................................................................................................  Patient/Patient Representative Signature      ..........................................................................................................................................  Patient Representative Print Name and Relationship to Patient    ..................................................               ................................................  Date                                   Time    ..........................................................................................................................................  Reviewed by Signature/Title    ...................................................              ..............................................  Date                                               Time          22EPIC Rev 08/18

## 2018-11-24 ENCOUNTER — APPOINTMENT (OUTPATIENT)
Dept: CT IMAGING | Facility: CLINIC | Age: 73
End: 2018-11-24
Attending: FAMILY MEDICINE
Payer: COMMERCIAL

## 2018-11-24 LAB
ALBUMIN SERPL-MCNC: 2.7 G/DL (ref 3.4–5)
ALP SERPL-CCNC: 40 U/L (ref 40–150)
ALT SERPL W P-5'-P-CCNC: 19 U/L (ref 0–70)
ANION GAP SERPL CALCULATED.3IONS-SCNC: 10 MMOL/L (ref 3–14)
AST SERPL W P-5'-P-CCNC: 12 U/L (ref 0–45)
BASOPHILS # BLD AUTO: 0 10E9/L (ref 0–0.2)
BASOPHILS NFR BLD AUTO: 0.2 %
BILIRUB DIRECT SERPL-MCNC: 0.2 MG/DL (ref 0–0.2)
BILIRUB SERPL-MCNC: 0.7 MG/DL (ref 0.2–1.3)
BUN SERPL-MCNC: 18 MG/DL (ref 7–30)
CALCIUM SERPL-MCNC: 7.3 MG/DL (ref 8.5–10.1)
CHLORIDE SERPL-SCNC: 108 MMOL/L (ref 94–109)
CO2 SERPL-SCNC: 23 MMOL/L (ref 20–32)
CREAT SERPL-MCNC: 0.89 MG/DL (ref 0.66–1.25)
DIFFERENTIAL METHOD BLD: ABNORMAL
EOSINOPHIL # BLD AUTO: 0.1 10E9/L (ref 0–0.7)
EOSINOPHIL NFR BLD AUTO: 1 %
ERYTHROCYTE [DISTWIDTH] IN BLOOD BY AUTOMATED COUNT: 15.1 % (ref 10–15)
GFR SERPL CREATININE-BSD FRML MDRD: 85 ML/MIN/1.7M2
GLUCOSE BLDC GLUCOMTR-MCNC: 137 MG/DL (ref 70–99)
GLUCOSE BLDC GLUCOMTR-MCNC: 163 MG/DL (ref 70–99)
GLUCOSE BLDC GLUCOMTR-MCNC: 184 MG/DL (ref 70–99)
GLUCOSE BLDC GLUCOMTR-MCNC: 193 MG/DL (ref 70–99)
GLUCOSE BLDC GLUCOMTR-MCNC: 195 MG/DL (ref 70–99)
GLUCOSE BLDC GLUCOMTR-MCNC: 208 MG/DL (ref 70–99)
GLUCOSE SERPL-MCNC: 215 MG/DL (ref 70–99)
HBA1C MFR BLD: 7.6 % (ref 0–5.6)
HCT VFR BLD AUTO: 37.3 % (ref 40–53)
HGB BLD-MCNC: 12 G/DL (ref 13.3–17.7)
IMM GRANULOCYTES # BLD: 0.1 10E9/L (ref 0–0.4)
IMM GRANULOCYTES NFR BLD: 0.7 %
INR PPP: 2.17 (ref 0.86–1.14)
LIPASE SERPL-CCNC: 1059 U/L (ref 73–393)
LYMPHOCYTES # BLD AUTO: 3.3 10E9/L (ref 0.8–5.3)
LYMPHOCYTES NFR BLD AUTO: 22.7 %
MCH RBC QN AUTO: 31.7 PG (ref 26.5–33)
MCHC RBC AUTO-ENTMCNC: 32.2 G/DL (ref 31.5–36.5)
MCV RBC AUTO: 99 FL (ref 78–100)
MONOCYTES # BLD AUTO: 0.7 10E9/L (ref 0–1.3)
MONOCYTES NFR BLD AUTO: 5.1 %
NEUTROPHILS # BLD AUTO: 10.2 10E9/L (ref 1.6–8.3)
NEUTROPHILS NFR BLD AUTO: 70.3 %
NRBC # BLD AUTO: 0 10*3/UL
NRBC BLD AUTO-RTO: 0 /100
PLATELET # BLD AUTO: 189 10E9/L (ref 150–450)
POTASSIUM SERPL-SCNC: 4.1 MMOL/L (ref 3.4–5.3)
PROT SERPL-MCNC: 6 G/DL (ref 6.8–8.8)
RBC # BLD AUTO: 3.78 10E12/L (ref 4.4–5.9)
SODIUM SERPL-SCNC: 141 MMOL/L (ref 133–144)
WBC # BLD AUTO: 14.6 10E9/L (ref 4–11)

## 2018-11-24 PROCEDURE — 83690 ASSAY OF LIPASE: CPT | Performed by: EMERGENCY MEDICINE

## 2018-11-24 PROCEDURE — 36415 COLL VENOUS BLD VENIPUNCTURE: CPT | Performed by: EMERGENCY MEDICINE

## 2018-11-24 PROCEDURE — 25000132 ZZH RX MED GY IP 250 OP 250 PS 637: Performed by: FAMILY MEDICINE

## 2018-11-24 PROCEDURE — 96372 THER/PROPH/DIAG INJ SC/IM: CPT

## 2018-11-24 PROCEDURE — 25000131 ZZH RX MED GY IP 250 OP 636 PS 637: Performed by: PHYSICIAN ASSISTANT

## 2018-11-24 PROCEDURE — 25000128 H RX IP 250 OP 636: Performed by: FAMILY MEDICINE

## 2018-11-24 PROCEDURE — G0378 HOSPITAL OBSERVATION PER HR: HCPCS

## 2018-11-24 PROCEDURE — 85610 PROTHROMBIN TIME: CPT | Performed by: EMERGENCY MEDICINE

## 2018-11-24 PROCEDURE — 85025 COMPLETE CBC W/AUTO DIFF WBC: CPT | Performed by: EMERGENCY MEDICINE

## 2018-11-24 PROCEDURE — 25000132 ZZH RX MED GY IP 250 OP 250 PS 637: Performed by: PHYSICIAN ASSISTANT

## 2018-11-24 PROCEDURE — 25000125 ZZHC RX 250: Performed by: FAMILY MEDICINE

## 2018-11-24 PROCEDURE — 12000000 ZZH R&B MED SURG/OB

## 2018-11-24 PROCEDURE — 00000146 ZZHCL STATISTIC GLUCOSE BY METER IP

## 2018-11-24 PROCEDURE — 74177 CT ABD & PELVIS W/CONTRAST: CPT

## 2018-11-24 PROCEDURE — 80048 BASIC METABOLIC PNL TOTAL CA: CPT | Performed by: EMERGENCY MEDICINE

## 2018-11-24 PROCEDURE — 25000132 ZZH RX MED GY IP 250 OP 250 PS 637: Performed by: EMERGENCY MEDICINE

## 2018-11-24 PROCEDURE — 80076 HEPATIC FUNCTION PANEL: CPT | Performed by: EMERGENCY MEDICINE

## 2018-11-24 PROCEDURE — 99223 1ST HOSP IP/OBS HIGH 75: CPT | Mod: AI | Performed by: FAMILY MEDICINE

## 2018-11-24 PROCEDURE — 25000128 H RX IP 250 OP 636: Performed by: EMERGENCY MEDICINE

## 2018-11-24 RX ORDER — PROCHLORPERAZINE 25 MG
12.5 SUPPOSITORY, RECTAL RECTAL EVERY 12 HOURS PRN
Status: DISCONTINUED | OUTPATIENT
Start: 2018-11-24 | End: 2018-11-27 | Stop reason: HOSPADM

## 2018-11-24 RX ORDER — NALOXONE HYDROCHLORIDE 0.4 MG/ML
.1-.4 INJECTION, SOLUTION INTRAMUSCULAR; INTRAVENOUS; SUBCUTANEOUS
Status: DISCONTINUED | OUTPATIENT
Start: 2018-11-24 | End: 2018-11-27 | Stop reason: HOSPADM

## 2018-11-24 RX ORDER — WARFARIN SODIUM 2.5 MG/1
2.5 TABLET ORAL ONCE
Status: COMPLETED | OUTPATIENT
Start: 2018-11-24 | End: 2018-11-24

## 2018-11-24 RX ORDER — MULTIPLE VITAMINS W/ MINERALS TAB 9MG-400MCG
1 TAB ORAL DAILY
Status: DISCONTINUED | OUTPATIENT
Start: 2018-11-24 | End: 2018-11-27 | Stop reason: HOSPADM

## 2018-11-24 RX ORDER — SODIUM CHLORIDE 9 MG/ML
INJECTION, SOLUTION INTRAVENOUS CONTINUOUS
Status: DISCONTINUED | OUTPATIENT
Start: 2018-11-24 | End: 2018-11-24

## 2018-11-24 RX ORDER — PROCHLORPERAZINE MALEATE 5 MG
5 TABLET ORAL EVERY 6 HOURS PRN
Status: DISCONTINUED | OUTPATIENT
Start: 2018-11-24 | End: 2018-11-27 | Stop reason: HOSPADM

## 2018-11-24 RX ORDER — WARFARIN SODIUM 2.5 MG/1
2.5 TABLET ORAL
Status: COMPLETED | OUTPATIENT
Start: 2018-11-24 | End: 2018-11-24

## 2018-11-24 RX ORDER — IOPAMIDOL 755 MG/ML
95 INJECTION, SOLUTION INTRAVASCULAR ONCE
Status: COMPLETED | OUTPATIENT
Start: 2018-11-24 | End: 2018-11-24

## 2018-11-24 RX ORDER — LORAZEPAM 2 MG/ML
0.5 INJECTION INTRAMUSCULAR EVERY 4 HOURS PRN
Status: DISCONTINUED | OUTPATIENT
Start: 2018-11-24 | End: 2018-11-27 | Stop reason: HOSPADM

## 2018-11-24 RX ORDER — LANOLIN ALCOHOL/MO/W.PET/CERES
100 CREAM (GRAM) TOPICAL DAILY
Status: DISCONTINUED | OUTPATIENT
Start: 2018-11-24 | End: 2018-11-27 | Stop reason: HOSPADM

## 2018-11-24 RX ORDER — LORAZEPAM 2 MG/ML
1-2 INJECTION INTRAMUSCULAR EVERY 30 MIN PRN
Status: DISCONTINUED | OUTPATIENT
Start: 2018-11-24 | End: 2018-11-26

## 2018-11-24 RX ORDER — ACETAMINOPHEN 325 MG/1
650 TABLET ORAL EVERY 4 HOURS PRN
Status: DISCONTINUED | OUTPATIENT
Start: 2018-11-24 | End: 2018-11-27 | Stop reason: HOSPADM

## 2018-11-24 RX ORDER — SODIUM CHLORIDE, SODIUM LACTATE, POTASSIUM CHLORIDE, CALCIUM CHLORIDE 600; 310; 30; 20 MG/100ML; MG/100ML; MG/100ML; MG/100ML
INJECTION, SOLUTION INTRAVENOUS CONTINUOUS
Status: DISCONTINUED | OUTPATIENT
Start: 2018-11-24 | End: 2018-11-25

## 2018-11-24 RX ORDER — LORAZEPAM 0.5 MG/1
0.5 TABLET ORAL EVERY 4 HOURS PRN
Status: DISCONTINUED | OUTPATIENT
Start: 2018-11-24 | End: 2018-11-27 | Stop reason: HOSPADM

## 2018-11-24 RX ORDER — GABAPENTIN 300 MG/1
300 CAPSULE ORAL 3 TIMES DAILY
Status: DISCONTINUED | OUTPATIENT
Start: 2018-11-24 | End: 2018-11-27 | Stop reason: HOSPADM

## 2018-11-24 RX ORDER — HYDROCODONE BITARTRATE AND ACETAMINOPHEN 5; 325 MG/1; MG/1
1-2 TABLET ORAL EVERY 4 HOURS PRN
Status: DISCONTINUED | OUTPATIENT
Start: 2018-11-24 | End: 2018-11-27 | Stop reason: HOSPADM

## 2018-11-24 RX ORDER — GABAPENTIN 600 MG/1
600 TABLET ORAL AT BEDTIME
Status: DISCONTINUED | OUTPATIENT
Start: 2018-11-24 | End: 2018-11-27 | Stop reason: HOSPADM

## 2018-11-24 RX ORDER — TAMSULOSIN HYDROCHLORIDE 0.4 MG/1
0.4 CAPSULE ORAL DAILY
Status: DISCONTINUED | OUTPATIENT
Start: 2018-11-24 | End: 2018-11-27 | Stop reason: HOSPADM

## 2018-11-24 RX ORDER — LORAZEPAM 1 MG/1
1-2 TABLET ORAL EVERY 30 MIN PRN
Status: DISCONTINUED | OUTPATIENT
Start: 2018-11-24 | End: 2018-11-26

## 2018-11-24 RX ORDER — LISINOPRIL 5 MG/1
5 TABLET ORAL DAILY
Status: DISCONTINUED | OUTPATIENT
Start: 2018-11-24 | End: 2018-11-27 | Stop reason: HOSPADM

## 2018-11-24 RX ORDER — NICOTINE POLACRILEX 4 MG
15-30 LOZENGE BUCCAL
Status: DISCONTINUED | OUTPATIENT
Start: 2018-11-24 | End: 2018-11-27 | Stop reason: HOSPADM

## 2018-11-24 RX ORDER — FOLIC ACID 1 MG/1
1 TABLET ORAL DAILY
Status: DISCONTINUED | OUTPATIENT
Start: 2018-11-24 | End: 2018-11-27 | Stop reason: HOSPADM

## 2018-11-24 RX ORDER — ONDANSETRON 2 MG/ML
4 INJECTION INTRAMUSCULAR; INTRAVENOUS EVERY 6 HOURS PRN
Status: DISCONTINUED | OUTPATIENT
Start: 2018-11-24 | End: 2018-11-27 | Stop reason: HOSPADM

## 2018-11-24 RX ORDER — ONDANSETRON 4 MG/1
4 TABLET, ORALLY DISINTEGRATING ORAL EVERY 6 HOURS PRN
Status: DISCONTINUED | OUTPATIENT
Start: 2018-11-24 | End: 2018-11-27 | Stop reason: HOSPADM

## 2018-11-24 RX ORDER — DEXTROSE MONOHYDRATE 25 G/50ML
25-50 INJECTION, SOLUTION INTRAVENOUS
Status: DISCONTINUED | OUTPATIENT
Start: 2018-11-24 | End: 2018-11-27 | Stop reason: HOSPADM

## 2018-11-24 RX ADMIN — SODIUM CHLORIDE: 9 INJECTION, SOLUTION INTRAVENOUS at 07:04

## 2018-11-24 RX ADMIN — METOPROLOL SUCCINATE 75 MG: 50 TABLET, EXTENDED RELEASE ORAL at 20:20

## 2018-11-24 RX ADMIN — SODIUM CHLORIDE, POTASSIUM CHLORIDE, SODIUM LACTATE AND CALCIUM CHLORIDE: 600; 310; 30; 20 INJECTION, SOLUTION INTRAVENOUS at 13:14

## 2018-11-24 RX ADMIN — FOLIC ACID 1 MG: 1 TABLET ORAL at 08:48

## 2018-11-24 RX ADMIN — SODIUM CHLORIDE, POTASSIUM CHLORIDE, SODIUM LACTATE AND CALCIUM CHLORIDE: 600; 310; 30; 20 INJECTION, SOLUTION INTRAVENOUS at 18:35

## 2018-11-24 RX ADMIN — GABAPENTIN 300 MG: 300 CAPSULE ORAL at 13:13

## 2018-11-24 RX ADMIN — HYDROCODONE BITARTRATE AND ACETAMINOPHEN 1 TABLET: 5; 325 TABLET ORAL at 11:57

## 2018-11-24 RX ADMIN — WARFARIN SODIUM 2.5 MG: 2.5 TABLET ORAL at 18:35

## 2018-11-24 RX ADMIN — TAMSULOSIN HYDROCHLORIDE 0.4 MG: 0.4 CAPSULE ORAL at 08:48

## 2018-11-24 RX ADMIN — INSULIN ASPART 3 UNITS: 100 INJECTION, SOLUTION INTRAVENOUS; SUBCUTANEOUS at 08:46

## 2018-11-24 RX ADMIN — HYDROCODONE BITARTRATE AND ACETAMINOPHEN 1 TABLET: 5; 325 TABLET ORAL at 01:22

## 2018-11-24 RX ADMIN — GABAPENTIN 600 MG: 600 TABLET, FILM COATED ORAL at 01:21

## 2018-11-24 RX ADMIN — Medication 100 MG: at 08:48

## 2018-11-24 RX ADMIN — SODIUM CHLORIDE, POTASSIUM CHLORIDE, SODIUM LACTATE AND CALCIUM CHLORIDE: 600; 310; 30; 20 INJECTION, SOLUTION INTRAVENOUS at 23:14

## 2018-11-24 RX ADMIN — METOPROLOL SUCCINATE 75 MG: 50 TABLET, EXTENDED RELEASE ORAL at 08:48

## 2018-11-24 RX ADMIN — GABAPENTIN 300 MG: 300 CAPSULE ORAL at 21:50

## 2018-11-24 RX ADMIN — GABAPENTIN 600 MG: 600 TABLET, FILM COATED ORAL at 21:51

## 2018-11-24 RX ADMIN — INSULIN ASPART 4 UNITS: 100 INJECTION, SOLUTION INTRAVENOUS; SUBCUTANEOUS at 06:08

## 2018-11-24 RX ADMIN — LISINOPRIL 5 MG: 5 TABLET ORAL at 08:48

## 2018-11-24 RX ADMIN — SODIUM CHLORIDE: 9 INJECTION, SOLUTION INTRAVENOUS at 02:01

## 2018-11-24 RX ADMIN — ONDANSETRON 4 MG: 2 INJECTION INTRAMUSCULAR; INTRAVENOUS at 09:58

## 2018-11-24 RX ADMIN — HYDROCODONE BITARTRATE AND ACETAMINOPHEN 1 TABLET: 5; 325 TABLET ORAL at 21:51

## 2018-11-24 RX ADMIN — GABAPENTIN 300 MG: 300 CAPSULE ORAL at 01:21

## 2018-11-24 RX ADMIN — INSULIN ASPART 3 UNITS: 100 INJECTION, SOLUTION INTRAVENOUS; SUBCUTANEOUS at 13:13

## 2018-11-24 RX ADMIN — INSULIN ASPART 1 UNITS: 100 INJECTION, SOLUTION INTRAVENOUS; SUBCUTANEOUS at 17:19

## 2018-11-24 RX ADMIN — SODIUM CHLORIDE 64 ML: 9 INJECTION, SOLUTION INTRAVENOUS at 11:39

## 2018-11-24 RX ADMIN — SODIUM CHLORIDE 1000 ML: 9 INJECTION, SOLUTION INTRAVENOUS at 00:55

## 2018-11-24 RX ADMIN — WARFARIN SODIUM 2.5 MG: 2.5 TABLET ORAL at 01:21

## 2018-11-24 RX ADMIN — MULTIPLE VITAMINS W/ MINERALS TAB 1 TABLET: TAB at 08:48

## 2018-11-24 RX ADMIN — GABAPENTIN 300 MG: 300 CAPSULE ORAL at 08:48

## 2018-11-24 RX ADMIN — IOPAMIDOL 95 ML: 755 INJECTION, SOLUTION INTRAVENOUS at 11:39

## 2018-11-24 RX ADMIN — HYDROCODONE BITARTRATE AND ACETAMINOPHEN 1 TABLET: 5; 325 TABLET ORAL at 06:22

## 2018-11-24 RX ADMIN — METOPROLOL SUCCINATE 75 MG: 50 TABLET, EXTENDED RELEASE ORAL at 01:22

## 2018-11-24 RX ADMIN — HYDROCODONE BITARTRATE AND ACETAMINOPHEN 1 TABLET: 5; 325 TABLET ORAL at 17:18

## 2018-11-24 ASSESSMENT — ACTIVITIES OF DAILY LIVING (ADL)
ADLS_ACUITY_SCORE: 11

## 2018-11-24 NOTE — ED NOTES
"Patient has  [place] to Observation  order. Patient has been given the Observation brochure -  What does Observation mean to me.\"  Patient has been given the opportunity to ask questions about observation status and their plan of care.      Jonatan Kirkpatrick  "

## 2018-11-24 NOTE — ED NOTES
Pt here with abdominal pain and nausea that started this afternoon. The patient has a history of pancreatitis and he states it feels the same.

## 2018-11-24 NOTE — PROGRESS NOTES
Secondary skin assessment done with admitting RN, Roxy CENTENO RN agree with assessment and documented findings.

## 2018-11-24 NOTE — ED PROVIDER NOTES
History     Chief Complaint   Patient presents with     Abdominal Pain     Hx of pancreatitis  was hopitallized recently for same symptoms    Started early today  increase intensity      HPI  Antoine Russo is a 72 year old male who has past medical history significant for chronic pancreatitis due to alcohol, and I PMN, paroxysmal A. fib, type 2 diabetes, CAD, hypertension, history of colitis status post partial colectomy, with history of head and neck cancer, who presents to the emergency department with approximately 1 day history of epigastric abdominal pain, with associated nausea.  Patient states his symptoms are similar to previous episodes of pancreatitis.  Patient has not taken any pain medications at home.  He denies any vomiting.  Denies fever, however has had chills.  Patient does have significant alcohol abuse history, and was abstinent from January through July, however began drinking beer once again in July, and was hospitalized last month.  I reviewed his hospitalization from October 21 - October 23, and patient was treated for his acute on chronic pancreatitis.  Patient denies any other recent changes in medications.  Denies chest pain, shortness of breath, cough, or other concerns at this point    Problem List:    Patient Active Problem List    Diagnosis Date Noted     Anticipated difficulty with intubation 05/23/2017     Priority: High     Class: Chronic     Difficult two hands mask ventilation, intubated multiple times asleep with video laryngoscope. H/o tongue cancer surgery.        Acute pancreatitis 10/21/2018     Priority: Medium     Necrotizing pancreatitis 04/17/2018     Priority: Medium     Long term current use of anticoagulant therapy 04/05/2018     Priority: Medium     Recurrent pancreatitis (H) 03/30/2018     Priority: Medium     Pancreatitis 01/23/2018     Priority: Medium     Chronic atrial fibrillation (H) 01/23/2018     Priority: Medium     Alcohol-induced acute pancreatitis  without infection or necrosis 01/11/2018     Priority: Medium     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 Please refer if Care Coordination services are needed.    Presenting Problem Signs and Symptoms Treatment Plan   Questions or concerns   during clinic hours   I will call my clinic directly:  [address] [id number] [phone].   Questions or concerns outside clinic hours   I will call the 24 hour nurse line at   [phone] or 404[facility].   Need to schedule an appointment   I will call the 24 hour scheduling team at [phone] or my clinic directly at [phone].   Same day treatment     I will call my clinic first, nurse line if after hours, urgent care and express care if needed.   Clinic care coordination services (regular clinic hours)   I will call a clinic care coordinator directly:     Shelia Emanuel RN [facility]  [phone]    Brianna Cristobal, SW:    [phone]    Or call my clinic at [phone] and ask to speak with care coordination.   Crisis Services: Behavioral or Mental Health  [facility] 24 Hour Phone Line  [phone]    [facility] 24 Hour Crisis Services  [phone]    [facility] ([facility]al Health Providers) [facility] [phone]    [facility]   [phone]     Emergency treatment -- Immediately    [facility] 911                Clostridium difficile enterocolitis 12/18/2012     Priority: Medium      Groin fluid collection 12/15/2012     Priority: Medium     CT 12/12- New (since 5/11) collection measuring at least 2.3 cm in diameter and 6.5 cm in length within the right inguinal canal. 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 Gerson Ravi and collekapil at the  in 2012  NAD 2017  CT scan of the neck every 2-3 years.  - Thyroid labs yearly.  - Carotid ultrasound in three to four years to evaluate for stenosis.       Colon polyp 08/26/2011     Priority: Medium     Colonoscopy 8/2011-A sessile polyp was found in the cecum. The polyp was 6 mm in size. The polyp was removed with a hot snare. Resection and retrieval were complete. A sessile polyp was found in the proximal transverse colon. The polyp was 15 mm in size. The polyp was removed with a hot snare. Resection and retrieval were complete. A sessile polyp was found in the sigmoid colon. The polyp was 5 mm in size       Hyperlipidemia LDL goal <100 10/31/2010     Priority: Medium     CAD (coronary artery disease) 05/26/2009     Priority: Medium     Stress testing 2009 showed inferior wall ischemia.  Cath preformed; identified moderate CAD with stenosis of 40-50% in LAD and RCA.  No stents were placed.  Echo in 01/2018 (done while in Afib with rates of 100-110); EF of 55-60%.  No RWMA.       GERD (gastroesophageal reflux disease) 05/26/2009     Priority: Medium     Hypertrophy of breast 09/25/2007     Priority: Medium     Diverticulitis of colon 07/17/2007     Priority: Medium     Colitis on CT Scan 5/2011- [place]  Colonoscopy 8/2011 Diverticulitis - Multiple small and large-mouthed diverticula  were found in the mid sigmoid colon and at the hepatic flexure. There was narrowing of the colon in association with the diverticular opening. Nena-diverticular erythema was seen. There was evidence of an impacted diverticulum. Purulent discharge was seen in association with the diverticlar opening. Biopsies were taken with a cold forceps for histology. Multiple small and large-mouthed diverticula were found in the sigmoid colon, in the descending colon, in the transverse colon and in the ascending colon.   Hospitalized diverticulitis 12/12           PERS HX TOBACCO USE - quit in 11/06 with chantix 03/15/2007     Priority: Medium     Hypertension, benign essential, goal below 140/90 11/07/2005     Priority: Medium     Patient has only fair bp control and with family history of diabetes will all ace if lab indicated also has bph and may op for psa and not digital exam next yr        Pain in joint, shoulder region 11/07/2005     Priority: Medium     Hypertrophy of prostate without urinary obstruction 11/07/2005     Priority: Medium     Problem list name updated by automated process. 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 difficile colitis      Colon polyp      Coronary artery disease      Diabetes mellitus (H)      Diverticulitis      Hypertension      Malignant neoplasm (H)      Noninfectious ileitis      Recurrent pancreatitis (H)        Past Surgical History:    Past Surgical History:   Procedure Laterality Date     BREAST SURGERY  2008    right breast mass benign     COLONOSCOPY      multiple polyps removed     COLONOSCOPY  8/24/2011    Procedure:COMBINED COLONOSCOPY, REMOVE TUMOR/POLYP/LESION BY SNARE; Surgeon:MILEY ARBOLEDA; Location:[facility]     COLONOSCOPY  12/17/2012    Procedure: COLONOSCOPY;;  Surgeon: Leon Maurer MD;  Location:  GI     COLONOSCOPY  12/18/2012     Procedure: COLONOSCOPY;;  Surgeon: Leon Maurer MD;  [facility]     DENERVATION OF SPERMATIC CORD MICROSURGICAL Left 5/23/2017    Procedure: DENERVATION OF SPERMATIC CORD MICROSURGICAL;;  Surgeon: Marcio Aggarwal MD;  [facility]     DISSECTION RADICAL NECK BILATERAL  8/2/2012    Procedure: DISSECTION RADICAL NECK BILATERAL;;  Surgeon: Yung Alvares MD;  [facility]     ENDOSCOPIC RETROGRADE CHOLANGIOPANCREATOGRAM N/A 5/10/2016    Procedure: COMBINED ENDOSCOPIC RETROGRADE CHOLANGIOPANCREATOGRAPHY, PLACE TUBE/STENT;  Surgeon: Yovanny Beasley MD;  [facility]     ENDOSCOPIC RETROGRADE CHOLANGIOPANCREATOGRAM N/A 3/29/2018    Procedure: ENDOSCOPIC RETROGRADE CHOLANGIOPANCREATOGRAM;  Endoscopic Retrograde Cholangiopancreatogram, Endoscopic Ultrasound, Biliary Sphincterotomy, Biliary and Pancreatic Stent Placement;  Surgeon: Yovanny Beasley MD;  [facility]     ENDOSCOPIC ULTRASOUND UPPER GASTROINTESTINAL TRACT (GI) N/A 2/3/2016    Procedure: ENDOSCOPIC ULTRASOUND, ESOPHAGOSCOPY / UPPER GASTROINTESTINAL TRACT (GI);  Surgeon: Grabiel Plata MD;  [facility]     ENDOSCOPIC ULTRASOUND UPPER GASTROINTESTINAL TRACT (GI) N/A 3/29/2018    Procedure: ENDOSCOPIC ULTRASOUND, ESOPHAGOSCOPY / UPPER GASTROINTESTINAL TRACT (GI);;  Surgeon: Grabiel Plata MD;  [facility]     ESOPHAGOSCOPY, GASTROSCOPY, DUODENOSCOPY (EGD), COMBINED N/A 2/3/2016    Procedure: COMBINED ENDOSCOPIC ULTRASOUND, ESOPHAGOSCOPY, GASTROSCOPY, DUODENOSCOPY (EGD), FINE NEEDLE ASPIRATE/BIOPSY;  Surgeon: Grabiel Plata MD;  [facility]     ESOPHAGOSCOPY, GASTROSCOPY, DUODENOSCOPY (EGD), COMBINED N/A 6/8/2016    Procedure: COMBINED ESOPHAGOSCOPY, GASTROSCOPY, DUODENOSCOPY (EGD), REMOVE FOREIGN BODY;  Surgeon: Yovanny Beasley MD;  [facility]     ESOPHAGOSCOPY, GASTROSCOPY, DUODENOSCOPY (EGD), COMBINED N/A 4/17/2018    Procedure: COMBINED ESOPHAGOSCOPY, GASTROSCOPY,  DUODENOSCOPY (EGD), REMOVE FOREIGN BODY;  EGD with stent removal;  Surgeon: Grabiel Plata MD;  [facility]     EXCISE LESION INTRAORAL  6/14/2012    Procedure: EXCISE LESION INTRAORAL;  Wide Local Excision Floor of Mouth, Direct Laryngoscopy, Bilateral Pe[place]'s Marsuplization, Split Thickness Skin Graft from right Thigh  Latex Safe;  Surgeon: Gerson Ravi MD;  [facility]     EXCISE LESION INTRAORAL  8/2/2012    Procedure: EXCISE LESION INTRAORAL;  Floor of Mouth Resection, Bilateral Selective Radical Neck Dissection, Tracheostomy, Left Radial Forearm  Free Flap with Alloderm, Nasogastric Feeding Tube Placement,    * Latex Safe*;  Surgeon: Gerson Ravi MD;  [facility]     EXCISE LESION INTRAORAL  12/11/2012    Procedure: EXCISE LESION INTRAORAL;  takedown of oral flap;  Surgeon: Yung Alvares MD;  [facility]     GRAFT FREE VASCULARIZED (LOCATION)  8/2/2012    Procedure: GRAFT FREE VASCULARIZED (LOCATION);;  Surgeon: Yung Alvares MD;  [facility]     GRAFT SKIN SPLIT THICKNESS FROM EXTREMITY  6/14/2012    Procedure: GRAFT SKIN SPLIT THICKNESS FROM EXTREMITY;;  Surgeon: Gerson Ravi MD;  [facility]     LAPAROSCOPIC ILEOSTOMY TAKEDOWN  6/6/2013    Procedure: LAPAROSCOPIC ILEOSTOMY TAKEDOWN;  Laparoscopic Closure of Enterostomy, Guerda's Type with IleoRectal Anastomosis ;  Surgeon: Grabiel Riddle MD;  [facility]     LAPAROTOMY EXPLORATORY  12/20/2012    Procedure: LAPAROTOMY EXPLORATORY;  Exploratory Laparotomy, total abdominal colectomy, ileostomy formation;  Surgeon: Miquel Cannon MD;  [facility]     LARYNGOSCOPY  6/14/2012    Procedure: LARYNGOSCOPY;;  Surgeon: Gerson Ravi MD;  [facility]     ORTHOPEDIC SURGERY      ganglian cyst left ankle     PANCREATECTOMY, SPLENECTOMY N/A 3/10/2016    Procedure: PANCREATECTOMY, SPLENECTOMY;  Surgeon: Nael Abel MD;  [facility]     SHOULDER SURGERY  2006, 2008    2006- right rotator cuff,   bone spur on left. Dr. Hdez     VARICOCELECTOMY Left 2017    Procedure: VARICOCELECTOMY;  Left Varicocele Repair, Denervation of Left Testis;  Surgeon: Marcio Aggarwal MD;  [facility]       Family History:    Family History   Problem Relation Age of Onset     Diabetes Sister      onset age 50     Alzheimer Disease Mother       80     Alzheimer Disease Father       85     Diabetes Other      nephew type 1     Diabetes Other      Aneurysm Sister      Anesthesia Reaction No family hx of      Colon Cancer No family hx of      Colon Polyps No family hx of      Crohn Disease No family hx of      Ulcerative Colitis No family hx of        Social History:  Marital Status:   [2]  Social History   Substance Use Topics     Smoking status: Former Smoker     Packs/day: 1.00     Years: 40.00     Types: Cigarettes     Quit date: 2006     Smokeless tobacco: Never Used     Alcohol use 16.8 oz/week     28 Cans of beer per week        Medications:      gabapentin (NEURONTIN) 300 MG capsule   gabapentin (NEURONTIN) 600 MG tablet   lisinopril (PRINIVIL/ZESTRIL) 5 MG tablet   metFORMIN (GLUCOPHAGE-XR) 500 MG 24 hr tablet   metoprolol succinate (TOPROL-XL) 50 MG 24 hr tablet   tamsulosin (FLOMAX) 0.4 MG capsule   warfarin (COUMADIN) 2.5 MG tablet   ASPIRIN NOT PRESCRIBED (INTENTIONAL)   multivitamin, therapeutic with minerals (THERA-VIT-M) TABS   [DISCONTINUED] warfarin (COUMADIN) 2.5 MG tablet         Review of Systems   Constitutional: Negative for fever.   Respiratory: Negative for shortness of breath.    Cardiovascular: Negative for chest pain.   Gastrointestinal: Positive for abdominal pain.   All other systems reviewed and are negative.      Physical Exam   BP: 162/76  Pulse: 98  Temp: 98.4  F (36.9  C)  Resp: 18  SpO2: 92 %      Physical Exam  /86  Pulse 98  Temp 98.4  F (36.9  C) (Oral)  Resp 18  SpO2 96%  General: alert, interactive, in no apparent distress  Head: atraumatic  Nose:  no rhinorrhea or epistaxis  Ears: no external auditory canal discharge or bleeding.    Eyes: Sclera nonicteric. Conjunctiva noninjected. PERRL, EOMI  Mouth: no tonsillar erythema, edema, or exudate  Neck: supple, no palp LAD  Lungs: CTAB  CV: RRR, S1/S2; peripheral pulses palpable and symmetric  Abdomen: soft, slightly tender in the epigastric region., nd, no guarding or rebound. Positive bowel sounds  Extremities: no cyanosis or edema  Skin: no rash or diaphoresis  Neuro:  strength 5/5 in UE and LEs bilaterally, sensation intact to light touch in UE and LEs bilaterally;       ED Course     ED Course     Procedures               Critical Care time:  none               Results for orders placed or performed during the hospital encounter of 11/23/18 (from the past 24 hour(s))   CBC with platelets differential   Result Value Ref Range    WBC 17.6 (H) 4.0 - 11.0 10e9/L    RBC Count 4.35 (L) 4.4 - 5.9 10e12/L    Hemoglobin 14.0 13.3 - 17.7 g/dL    Hematocrit 43.1 40.0 - 53.0 %    MCV 99 78 - 100 fl    MCH 32.2 26.5 - 33.0 pg    MCHC 32.5 31.5 - 36.5 g/dL    RDW 14.9 10.0 - 15.0 %    Platelet Count 207 150 - 450 10e9/L    Diff Method Automated Method     % Neutrophils 80.6 %    % Lymphocytes 12.5 %    % Monocytes 5.4 %    % Eosinophils 0.7 %    % Basophils 0.2 %    % Immature Granulocytes 0.6 %    Nucleated RBCs 0 0 /100    Absolute Neutrophil 14.2 (H) 1.6 - 8.3 10e9/L    Absolute Lymphocytes 2.2 0.8 - 5.3 10e9/L    Absolute Monocytes 1.0 0.0 - 1.3 10e9/L    Absolute Eosinophils 0.1 0.0 - 0.7 10e9/L    Absolute Basophils 0.0 0.0 - 0.2 10e9/L    Abs Immature Granulocytes 0.1 0 - 0.4 10e9/L    Absolute Nucleated RBC 0.0    Comprehensive metabolic panel   Result Value Ref Range    Sodium 136 133 - 144 mmol/L    Potassium 4.1 3.4 - 5.3 mmol/L    Chloride 102 94 - 109 mmol/L    Carbon Dioxide 27 20 - 32 mmol/L    Anion Gap 7 3 - 14 mmol/L    Glucose 269 (H) 70 - 99 mg/dL    Urea Nitrogen 21 7 - 30 mg/dL    Creatinine 0.89 0.66  - 1.25 mg/dL    GFR Estimate 85 >60 mL/min/1.7m2    GFR Estimate If Black >90 >60 mL/min/1.7m2    Calcium 8.9 8.5 - 10.1 mg/dL    Bilirubin Total 0.4 0.2 - 1.3 mg/dL    Albumin 3.6 3.4 - 5.0 g/dL    Protein Total 7.9 6.8 - 8.8 g/dL    Alkaline Phosphatase 56 40 - 150 U/L    ALT 26 0 - 70 U/L    AST 24 0 - 45 U/L   INR   Result Value Ref Range    INR 1.94 (H) 0.86 - 1.14   Lipase   Result Value Ref Range    Lipase 1109 (H) 73 - 393 U/L   Lactic acid whole blood   Result Value Ref Range    Lactic Acid 1.7 0.7 - 2.0 mmol/L       Medications   0.9% sodium chloride BOLUS (not administered)   0.9% sodium chloride BOLUS (0 mLs Intravenous Stopped 11/23/18 2043)   ondansetron (ZOFRAN) injection 4 mg (4 mg Intravenous Given 11/23/18 1903)   HYDROmorphone (DILAUDID) injection 1 mg (1 mg Intravenous Given 11/23/18 1951)   ketorolac (TORADOL) injection 15 mg (15 mg Intravenous Given 11/23/18 1951)       Assessments & Plan (with Medical Decision Making)  72 year old male, with past medical history as reviewed above, with history of chronic pancreatitis secondary to alcohol abuse, and IPMN,, coronary artery disease, hypertension, type 2 diabetes, anticoagulation on warfarin for history of paroxysmal atrial fibrillation, who presents to the emergency department with 1 day history of increased abdominal pain consistent with patient's prior episodes of pancreatitis.    Patient is complaining of epigastric abdominal discomfort, with benign abdominal exam, however concern is for pancreatitis based on his previous presentations, and current presentation.  He is afebrile, slightly ill, however nontoxic-appearing.    IV established, labs drawn.  Patient was slight elevation of white blood cell count, nonspecific in nature.  Lactate is normal.  He is afebrile, with no peritoneal signs.  His lipase is elevated at 1109.  This is significantly elevated compared to prior values.  Patient has not had any vomiting.  His CMP is normal with the  exception of hyperglycemia.    Patient given IV Dilaudid.  He did feel improved.  However, patient not able to tolerate much for liquids, or foods without significant amounts of abdominal discomfort.  Therefore, patient will be admitted to the hospitalist service for further management of his acute on chronic pancreatitis.  I discussed with hospitalist on-call, who agreed to assume care upon admission to the hospital.  Patient with nonsurgical abdomen which is present.  He has received 2 L IV normal saline.  He also received Toradol, and Dilaudid.     I have reviewed the nursing notes.    I have reviewed the findings, diagnosis, plan and need for follow up with the patient.       New Prescriptions    No medications on file       Final diagnoses:   Pancreatitis, recurrent (H)   Acute pancreatitis, unspecified complication status, unspecified pancreatitis type   Long term current use of anticoagulant therapy   Chronic atrial fibrillation (H)       11/23/2018   [facility] EMERGENCY DEPARTMENT     Catrachito Gould MD  11/23/18 1980

## 2018-11-24 NOTE — PLAN OF CARE
Problem: Pancreatitis, Acute/Chronic (Adult)  Goal: Signs and Symptoms of Listed Potential Problems Will be Absent, Minimized or Managed (Pancreatitis, Acute/Chronic)  Signs and symptoms of listed potential problems will be absent, minimized or managed by discharge/transition of care (reference Pancreatitis, Acute/Chronic (Adult) CPG).   Outcome: Improving  Pt up in room independently. C/o abd pain, relieved with 1 tab norco. Had emesis this AM, admin IV zofran with relief. BS hypoactive.  & 192. CIWA 0. Spouse at bedside. VSS. Lauren Hemphill RN

## 2018-11-24 NOTE — H&P
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  He has history of necrotizing pancreatitis, pancreatic duct leak, and a history of a stent earlier in 2018, since removed.  He was abstinent from EtOH from Jan-July, then started using 3-4 beers/day starting in July.  He also stopped his pancreatic enzymes in June due to cost.  Had 2L NS in ER, then on NS at 200, switch to LR at 200 with plan to slow tomorrow.  On dilaudid and toradol prn for pain.  Checking CT abdomen today given complex history including necrotizing pancreatitis in the past.   Continue NPO except medications/ice chips for now, if pain improving advance to clears later today, otherwise plan for clears in AM.      Alcohol abuse/dependence  11/24/2018 -- started drinking again this past summer, with recent bout of pancreatitis due to this last month.  Says only 1-2 per day.  Discussed in detail today that he needs to remain completely abstinent from alcohol.  Patient expresses understanding and agreement.  Started on CIWA and oral vitamins.        Hypertension, benign essential, goal below 140/90  11/24/2018 -- blood pressure intermittently elevated as it's been in the past - continue home lisinopril and metoprolol for now.         Hypertrophy of prostate without urinary obstruction  11/24/2018 -- continue home flomax.        CAD (coronary artery disease)/  Peripheral vascular disease (H)  11/24/2018 -- Non-Obstructive CAD seen on angiogram 2009, no stents or history of MI.  Not on statin for unclear reasons.  Not on ASA, on  "warfarin      Hyperlipidemia LDL goal <100  11/24/2018 -- not on a statin, defer to PCP.         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  Starting on high dose NPO sliding scale insulin.  A1c 7.6.        Spleen absent  11/24/2018 --  WBC elevated but coming down, no evidence of symptoms of infection, appears stress response from acute pancreatitis.  Follow. No indication for antibiotics at present.        Chronic atrial fibrillation (H)  11/24/2018 -- INR now therapeutic, pharmacy to dose coumadin. Rate controlled.      Prophylaxis  On coumadin     Lines  PIV    Disposition  Anticipate at least 2-3 days inpatient for this, still needing narcotics and NPO.  Admit to inpatient.                Chief Complaint:   Abdominal pain   History is obtained from the patient          History of Present Illness:   This patient is a 72 year old  male with a significant past medical history of recurrent alcoholic pancreatitis as discussed above, paroxysmal Afib, CAD, type 2 diabetes, hypertension, head/neck cancer and colitis s/p partial colectomy who presents with abdominal pain.  Pain started yesterday AM, upper abdomen and up to a 9/10.  Worse with eating.  Feels \"pretty much the same\" as his prior bouts of pancreatitis.  Had been sober until July, then started drinking again and had an episode of pancreatitis last month, says he's only been drinking \"1 or 2 per day, sometimes none\" since last admission.  Pain improved with medications, currently 2-3/10 after pain medications.  No fever or chills.  No other new symptoms or concerns.  No black or bloody stools or diarrhea.  No changes in medications.    Non-smoker, alcohol as above, no illicit drug use.               Past Medical History:   I have reviewed this patient's " past medical history.  Patient Active Problem List    Diagnosis Date Noted     Anticipated difficulty with intubation 05/23/2017     Priority: High     Class: Chronic     Difficult two hands mask ventilation, intubated multiple times asleep with video laryngoscope. H/o tongue cancer surgery.        Pancreatitis, recurrent (H) 11/24/2018     Priority: Medium     Acute pancreatitis 10/21/2018     Priority: Medium     Necrotizing pancreatitis 04/17/2018     Priority: Medium     Long term current use of anticoagulant therapy 04/05/2018     Priority: Medium     Recurrent pancreatitis (H) 03/30/2018     Priority: Medium     Pancreatitis 01/23/2018     Priority: Medium     Chronic atrial fibrillation (H) 01/23/2018     Priority: Medium     Alcohol-induced acute pancreatitis without infection or necrosis 01/11/2018     Priority: Medium     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 Please refer if Care Coordination services are needed.    Presenting Problem Signs and Symptoms Treatment Plan   Questions or concerns   during clinic hours   I will call my clinic directly:  [address] [address]  [phone].   Questions or concerns outside clinic hours   I will call the 24 hour nurse line at   [phone] or [facility].   Need to schedule an appointment   I will call the 24 hour scheduling team at [phone] or my clinic directly at [phone].   Same day  treatment     I will call my clinic first, nurse line if after hours, urgent care and express care if needed.   Clinic care coordination services (regular clinic hours)   I will call a clinic care coordinator directly:     Shelia Emanuel RN [facility]  [phone]    Brianna Cristobal, :    [phone]    Or call my clinic at [phone] and ask to speak with care coordination.   Crisis Services: Behavioral or Mental Health  [facility] 24 Hour Phone Line  [phone]    [facility] 24 Hour Crisis Services  [phone]    [facility] [phone]    [facility]   [phone]     Emergency treatment -- Immediately    [facility] 911                Clostridium difficile enterocolitis 12/18/2012     Priority: Medium     Groin fluid collection 12/15/2012     Priority: Medium     CT 12/12- New (since 5/11) collection measuring at least 2.3 cm in diameter and 6.5 cm in length within the right inguinal canal. 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 Gerson Ravi and aristides at the  in 2012  NAD 2017  CT scan of the neck every 2-3 years.  - Thyroid labs yearly.  - Carotid ultrasound in three to four years to evaluate for stenosis.       Colon polyp 08/26/2011     Priority: Medium     Colonoscopy 8/2011-A sessile polyp was found in the cecum. The polyp was 6 mm in size. The polyp was removed with a hot snare. Resection and retrieval were complete. A sessile polyp was found in the proximal transverse colon. The polyp was 15 mm in size. The polyp was removed with a hot snare. Resection and retrieval  were complete. A sessile polyp was found in the sigmoid colon. The polyp was 5 mm in size       Hyperlipidemia LDL goal <100 10/31/2010     Priority: Medium     CAD (coronary artery disease) 05/26/2009     Priority: Medium     Stress testing 2009 showed inferior wall ischemia.  Cath preformed; identified moderate CAD with stenosis of 40-50% in LAD and RCA.  No stents were placed.  Echo in 01/2018 (done while in Afib with rates of 100-110); EF of 55-60%.  No RWMA.       GERD (gastroesophageal reflux disease) 05/26/2009     Priority: Medium     Hypertrophy of breast 09/25/2007     Priority: Medium     Diverticulitis of colon 07/17/2007     Priority: Medium     Colitis on CT Scan 5/2011- [place]  Colonoscopy 8/2011 Diverticulitis - Multiple small and large-mouthed diverticula were found in the mid sigmoid colon and at the hepatic flexure. There was narrowing of the colon in association with the diverticular opening. Nena-diverticular erythema was seen. There was evidence of an impacted diverticulum. Purulent discharge was seen in association with the diverticlar opening. Biopsies were taken with a cold forceps for histology. Multiple small and large-mouthed diverticula were found in the sigmoid colon, in the descending colon, in the transverse colon and in the ascending colon.   Hospitalized diverticulitis 12/12           PERS HX TOBACCO USE - quit in 11/06 with chantix 03/15/2007     Priority: Medium     Hypertension, benign essential, goal below 140/90 11/07/2005     Priority: Medium     Patient has only fair bp control and with family history of diabetes will all ace if lab indicated also has bph and may op for psa and not digital exam next yr        Pain in joint, shoulder region 11/07/2005     Priority: Medium     Hypertrophy of prostate without urinary obstruction 11/07/2005     Priority: Medium     Problem list name updated by automated process. Provider to review       Impotence of organic origin 11/07/2005      Priority: Medium              Past Surgical History:   I have reviewed this patient's past surgical history   Past Surgical History:   Procedure Laterality Date     BREAST SURGERY  2008    right breast mass benign     COLONOSCOPY      multiple polyps removed     COLONOSCOPY  8/24/2011    Procedure:COMBINED COLONOSCOPY, REMOVE TUMOR/POLYP/LESION BY SNARE; Surgeon:MILEY ARBOLEDA; Location:[facility]     COLONOSCOPY  12/17/2012    Procedure: COLONOSCOPY;;  Surgeon: Leon Maurer MD;  [facility]     COLONOSCOPY  12/18/2012    Procedure: COLONOSCOPY;;  Surgeon: Leon Maurer MD;  [facility]     DENERVATION OF SPERMATIC CORD MICROSURGICAL Left 5/23/2017    Procedure: DENERVATION OF SPERMATIC CORD MICROSURGICAL;;  Surgeon: Marcio Aggarwal MD;  [facility]     DISSECTION RADICAL NECK BILATERAL  8/2/2012    Procedure: DISSECTION RADICAL NECK BILATERAL;;  Surgeon: Yung Alvares MD;  [facility]     ENDOSCOPIC RETROGRADE CHOLANGIOPANCREATOGRAM N/A 5/10/2016    Procedure: COMBINED ENDOSCOPIC RETROGRADE CHOLANGIOPANCREATOGRAPHY, PLACE TUBE/STENT;  Surgeon: Yovanny Beasley MD;  [facility]     ENDOSCOPIC RETROGRADE CHOLANGIOPANCREATOGRAM N/A 3/29/2018    Procedure: ENDOSCOPIC RETROGRADE CHOLANGIOPANCREATOGRAM;  Endoscopic Retrograde Cholangiopancreatogram, Endoscopic Ultrasound, Biliary Sphincterotomy, Biliary and Pancreatic Stent Placement;  Surgeon: Yovanny Beasley MD;  [facility]     ENDOSCOPIC ULTRASOUND UPPER GASTROINTESTINAL TRACT (GI) N/A 2/3/2016    Procedure: ENDOSCOPIC ULTRASOUND, ESOPHAGOSCOPY / UPPER GASTROINTESTINAL TRACT (GI);  Surgeon: Grabiel Plata MD;  [facility]     ENDOSCOPIC ULTRASOUND UPPER GASTROINTESTINAL TRACT (GI) N/A 3/29/2018    Procedure: ENDOSCOPIC ULTRASOUND, ESOPHAGOSCOPY / UPPER GASTROINTESTINAL TRACT (GI);;  Surgeon: Grabiel Plata MD;  [facility]     ESOPHAGOSCOPY, GASTROSCOPY, DUODENOSCOPY (EGD), COMBINED  N/A 2/3/2016    Procedure: COMBINED ENDOSCOPIC ULTRASOUND, ESOPHAGOSCOPY, GASTROSCOPY, DUODENOSCOPY (EGD), FINE NEEDLE ASPIRATE/BIOPSY;  Surgeon: Grabiel Plata MD;  [facility]     ESOPHAGOSCOPY, GASTROSCOPY, DUODENOSCOPY (EGD), COMBINED N/A 6/8/2016    Procedure: COMBINED ESOPHAGOSCOPY, GASTROSCOPY, DUODENOSCOPY (EGD), REMOVE FOREIGN BODY;  Surgeon: Yovanny Beasley MD;  [facility]     ESOPHAGOSCOPY, GASTROSCOPY, DUODENOSCOPY (EGD), COMBINED N/A 4/17/2018    Procedure: COMBINED ESOPHAGOSCOPY, GASTROSCOPY, DUODENOSCOPY (EGD), REMOVE FOREIGN BODY;  EGD with stent removal;  Surgeon: Grabiel Plata MD;  [facility]     EXCISE LESION INTRAORAL  6/14/2012    Procedure: EXCISE LESION INTRAORAL;  Wide Local Excision Floor of Mouth, Direct Laryngoscopy, Bilateral [place]'s Marsuplization, Split Thickness Skin Graft from right Thigh  Latex Safe;  Surgeon: Gerson Ravi MD;  [facility]     EXCISE LESION INTRAORAL  8/2/2012    Procedure: EXCISE LESION INTRAORAL;  Floor of Mouth Resection, Bilateral Selective Radical Neck Dissection, Tracheostomy, Left Radial Forearm  Free Flap with Alloderm, Nasogastric Feeding Tube Placement,    * Latex Safe*;  Surgeon: Gerson Ravi MD;  [facility]     EXCISE LESION INTRAORAL  12/11/2012    Procedure: EXCISE LESION INTRAORAL;  takedown of oral flap;  Surgeon: Yung Alvares MD;  [facility]     GRAFT FREE VASCULARIZED (LOCATION)  8/2/2012    Procedure: GRAFT FREE VASCULARIZED (LOCATION);;  Surgeon: Yung Alvares MD;  [facility]     GRAFT SKIN SPLIT THICKNESS FROM EXTREMITY  6/14/2012    Procedure: GRAFT SKIN SPLIT THICKNESS FROM EXTREMITY;;  Surgeon: Gerson Ravi MD;  [facility]     LAPAROSCOPIC ILEOSTOMY TAKEDOWN  6/6/2013    Procedure: LAPAROSCOPIC ILEOSTOMY TAKEDOWN;  Laparoscopic Closure of Enterostomy, Guerda's Type with IleoRectal Anastomosis ;  Surgeon: Grabiel Riddle MD;  [facility]     LAPAROTOMY  EXPLORATORY  2012    Procedure: LAPAROTOMY EXPLORATORY;  Exploratory Laparotomy, total abdominal colectomy, ileostomy formation;  Surgeon: Miquel Cannon MD;  [facility]     LARYNGOSCOPY  2012    Procedure: LARYNGOSCOPY;;  Surgeon: Gerson Ravi MD;  [facility]     ORTHOPEDIC SURGERY      ganglian cyst left ankle     PANCREATECTOMY, SPLENECTOMY N/A 3/10/2016    Procedure: PANCREATECTOMY, SPLENECTOMY;  Surgeon: Nael Abel MD;  [facility]     SHOULDER SURGERY  ,     2006- right rotator cuff,  bone spur on left. Dr. Hdez     VARICOCELECTOMY Left 2017    Procedure: VARICOCELECTOMY;  Left Varicocele Repair, Denervation of Left Testis;  Surgeon: Marcio Aggarwal MD;  Location:  OR             Social History:   I have reviewed this patient's social history   Social History   Substance Use Topics     Smoking status: Former Smoker     Packs/day: 1.00     Years: 40.00     Types: Cigarettes     Quit date: 2006     Smokeless tobacco: Never Used     Alcohol use 16.8 oz/week     28 Cans of beer per week             Family History:   I have reviewed this patient's family history  Family History   Problem Relation Age of Onset     Diabetes Sister      onset age 50     Alzheimer Disease Mother       80     Alzheimer Disease Father       85     Diabetes Other      nephew type 1     Diabetes Other      Aneurysm Sister      Anesthesia Reaction No family hx of      Colon Cancer No family hx of      Colon Polyps No family hx of      Crohn Disease No family hx of      Ulcerative Colitis No family hx of              Immunizations:   Immunizations are current          Allergies:     Allergies   Allergen Reactions     Nkda [No Known Drug Allergies]              Medications:     Prescriptions Prior to Admission   Medication Sig Dispense Refill Last Dose     gabapentin (NEURONTIN) 300 MG capsule Take 1 capsule (300 mg) by mouth 3 times daily Take one tablet (300mg) three  "times daily. 90 capsule 3 2018 at am     gabapentin (NEURONTIN) 600 MG tablet Take 1 tablet (600 mg) by mouth At Bedtime Along with 300mg for a total of 900mg for bedtime dose 30 tablet 3 2018 at hs     lisinopril (PRINIVIL/ZESTRIL) 5 MG tablet TAKE 1 TABLET BY MOUTH ONCE DAILY 90 tablet 1 2018 at am     metFORMIN (GLUCOPHAGE-XR) 500 MG 24 hr tablet TAKE TWO TABLETS BY MOUTH TWICE DAILY WITH MEALS 360 tablet 0 2018 at am     metoprolol succinate (TOPROL-XL) 50 MG 24 hr tablet Take 1.5 tablets (75 mg) by mouth 2 times daily 270 tablet 3 2018 at am     tamsulosin (FLOMAX) 0.4 MG capsule Take 1 capsule (0.4 mg) by mouth daily 90 capsule 3 2018 at am     warfarin (COUMADIN) 2.5 MG tablet 2.5 mg daily or as directed by [facility] 90 tablet 0 2018 at hs     ASPIRIN NOT PRESCRIBED (INTENTIONAL) Antiplatelet medication not prescribed intentionally due to Current anticoagulant therapy (warfarin/enoxaparin)   Taking     multivitamin, therapeutic with minerals (THERA-VIT-M) TABS Take 1 tablet by mouth daily. 30 each 1 Taking             Review of Systems:    ROS: 10 point ROS neg other than the symptoms noted above in the HPI.           Physical Exam:   Blood pressure 103/49, pulse 71, temperature 97.2  F (36.2  C), temperature source Oral, resp. rate 16, height 1.778 m (5' 10\"), weight 88.2 kg (194 lb 7.1 oz), SpO2 96 %.  Temperatures:  Current - Temp: 97.2  F (36.2  C); Max - Temp  Av.2  F (36.8  C)  Min: 97.2  F (36.2  C)  Max: 98.9  F (37.2  C)  Respiration range: Resp  Av.3  Min: 16  Max: 18  Pulse range: Pulse  Av.3  Min: 71  Max: 98  Blood pressure range: Systolic (24hrs), Av , Min:103 , Max:170   ; Diastolic (24hrs), Av, Min:49, Max:86    Pulse oximetry range: SpO2  Av.3 %  Min: 92 %  Max: 96 %    Intake/Output Summary (Last 24 hours) at 18 0781  Last data filed at 18 0600   Gross per 24 hour   Intake             2875 ml   Output              " 320 ml   Net             2555 ml     EXAM:  General: awake and alert, NAD, oriented x 3  Head: normocephalic  Neck: unremarkable, no lymphadenopathy   HEENT: oropharynx pink and moist    Heart: Regular rate and rhythm, no murmurs, rubs, or gallops  Lungs: clear to auscultation bilaterally with good air movement throughout  Abdomen: soft, non-tender, no masses or organomegaly  Extremities: no edema in lower extremities   Skin unremarkable.             Data:     Results for orders placed or performed during the hospital encounter of 11/23/18 (from the past 24 hour(s))   CBC with platelets differential   Result Value Ref Range    WBC 17.6 (H) 4.0 - 11.0 10e9/L    RBC Count 4.35 (L) 4.4 - 5.9 10e12/L    Hemoglobin 14.0 13.3 - 17.7 g/dL    Hematocrit 43.1 40.0 - 53.0 %    MCV 99 78 - 100 fl    MCH 32.2 26.5 - 33.0 pg    MCHC 32.5 31.5 - 36.5 g/dL    RDW 14.9 10.0 - 15.0 %    Platelet Count 207 150 - 450 10e9/L    Diff Method Automated Method     % Neutrophils 80.6 %    % Lymphocytes 12.5 %    % Monocytes 5.4 %    % Eosinophils 0.7 %    % Basophils 0.2 %    % Immature Granulocytes 0.6 %    Nucleated RBCs 0 0 /100    Absolute Neutrophil 14.2 (H) 1.6 - 8.3 10e9/L    Absolute Lymphocytes 2.2 0.8 - 5.3 10e9/L    Absolute Monocytes 1.0 0.0 - 1.3 10e9/L    Absolute Eosinophils 0.1 0.0 - 0.7 10e9/L    Absolute Basophils 0.0 0.0 - 0.2 10e9/L    Abs Immature Granulocytes 0.1 0 - 0.4 10e9/L    Absolute Nucleated RBC 0.0    Comprehensive metabolic panel   Result Value Ref Range    Sodium 136 133 - 144 mmol/L    Potassium 4.1 3.4 - 5.3 mmol/L    Chloride 102 94 - 109 mmol/L    Carbon Dioxide 27 20 - 32 mmol/L    Anion Gap 7 3 - 14 mmol/L    Glucose 269 (H) 70 - 99 mg/dL    Urea Nitrogen 21 7 - 30 mg/dL    Creatinine 0.89 0.66 - 1.25 mg/dL    GFR Estimate 85 >60 mL/min/1.7m2    GFR Estimate If Black >90 >60 mL/min/1.7m2    Calcium 8.9 8.5 - 10.1 mg/dL    Bilirubin Total 0.4 0.2 - 1.3 mg/dL    Albumin 3.6 3.4 - 5.0 g/dL    Protein  Total 7.9 6.8 - 8.8 g/dL    Alkaline Phosphatase 56 40 - 150 U/L    ALT 26 0 - 70 U/L    AST 24 0 - 45 U/L   INR   Result Value Ref Range    INR 1.94 (H) 0.86 - 1.14   Lipase   Result Value Ref Range    Lipase 1109 (H) 73 - 393 U/L   Lactic acid whole blood   Result Value Ref Range    Lactic Acid 1.7 0.7 - 2.0 mmol/L   Hemoglobin A1c   Result Value Ref Range    Hemoglobin A1C 7.6 (H) 0 - 5.6 %   Glucose by meter   Result Value Ref Range    Glucose 208 (H) 70 - 99 mg/dL   CBC with platelets differential   Result Value Ref Range    WBC 14.6 (H) 4.0 - 11.0 10e9/L    RBC Count 3.78 (L) 4.4 - 5.9 10e12/L    Hemoglobin 12.0 (L) 13.3 - 17.7 g/dL    Hematocrit 37.3 (L) 40.0 - 53.0 %    MCV 99 78 - 100 fl    MCH 31.7 26.5 - 33.0 pg    MCHC 32.2 31.5 - 36.5 g/dL    RDW 15.1 (H) 10.0 - 15.0 %    Platelet Count 189 150 - 450 10e9/L    Diff Method Automated Method     % Neutrophils 70.3 %    % Lymphocytes 22.7 %    % Monocytes 5.1 %    % Eosinophils 1.0 %    % Basophils 0.2 %    % Immature Granulocytes 0.7 %    Nucleated RBCs 0 0 /100    Absolute Neutrophil 10.2 (H) 1.6 - 8.3 10e9/L    Absolute Lymphocytes 3.3 0.8 - 5.3 10e9/L    Absolute Monocytes 0.7 0.0 - 1.3 10e9/L    Absolute Eosinophils 0.1 0.0 - 0.7 10e9/L    Absolute Basophils 0.0 0.0 - 0.2 10e9/L    Abs Immature Granulocytes 0.1 0 - 0.4 10e9/L    Absolute Nucleated RBC 0.0    Basic metabolic panel   Result Value Ref Range    Sodium 141 133 - 144 mmol/L    Potassium 4.1 3.4 - 5.3 mmol/L    Chloride 108 94 - 109 mmol/L    Carbon Dioxide 23 20 - 32 mmol/L    Anion Gap 10 3 - 14 mmol/L    Glucose 215 (H) 70 - 99 mg/dL    Urea Nitrogen 18 7 - 30 mg/dL    Creatinine 0.89 0.66 - 1.25 mg/dL    GFR Estimate 85 >60 mL/min/1.7m2    GFR Estimate If Black >90 >60 mL/min/1.7m2    Calcium 7.3 (L) 8.5 - 10.1 mg/dL   INR   Result Value Ref Range    INR 2.17 (H) 0.86 - 1.14   Hepatic panel   Result Value Ref Range    Bilirubin Direct 0.2 0.0 - 0.2 mg/dL    Bilirubin Total 0.7 0.2 - 1.3  mg/dL    Albumin 2.7 (L) 3.4 - 5.0 g/dL    Protein Total 6.0 (L) 6.8 - 8.8 g/dL    Alkaline Phosphatase 40 40 - 150 U/L    ALT 19 0 - 70 U/L    AST 12 0 - 45 U/L   Lipase   Result Value Ref Range    Lipase 1059 (H) 73 - 393 U/L   Glucose by meter   Result Value Ref Range    Glucose 184 (H) 70 - 99 mg/dL           Attestation:  I have reviewed today's vital signs, notes, medications, labs and imaging.  Amount of time performed on this history and physical: 60 minutes.        Willam Nevarez MD

## 2018-11-24 NOTE — PROGRESS NOTES
"[facility] [place]    Patient admitted to room 2310 at approximately 0000 via cart from emergency room. Patient was accompanied by transport tech.     Verbal SBAR report received from Guido RN prior to patient arrival.     Patient ambulated to bed independently. Patient alert and oriented X 3. Pain is controlled with current analgesics.  Medication(s) being used: narcotic analgesics including NORCO. 0-10 Pain Scale: 9. Admission vital signs: Blood pressure 122/60, pulse 90, temperature 98.9  F (37.2  C), temperature source Oral, resp. rate 18, height 1.778 m (5' 10\"), weight 88.2 kg (194 lb 7.1 oz), SpO2 96 %. Patient was oriented to plan of care, call light, bed controls, tv, telephone, bathroom and visiting hours.     Risk Assessment    The following safety risks were identified during admission: fall. Yellow risk band applied: NO.     Skin Initial Assessment    This writer admitted this patient and completed a full skin assessment and Romain score in the Adult PCS flowsheet. Appropriate interventions initiated as needed.     Secondary skin check completed by Kayy Garcia    "

## 2018-11-24 NOTE — PLAN OF CARE
"Problem: Patient Care Overview  Goal: Plan of Care/Patient Progress Review  Outcome: No Change  Pt stated last alcoholic beverage as \"Thanksgiving (11/22/18) and having had one beer as usual\" pt is a/ox4, on 2 LPM NC. Pt states tender on left abdomen, have been administering 1 NORCO every 4 hours w/effective results. 1 liter bolus administered on admit and IV running at 200 lpm of NS after. Glucose checks every 4 hours has been running over 200. Pt has been pleasant and cooperative with [facility]. States they are a former [facility]  that is now retired, likes to be called \"[employer]\" .      "

## 2018-11-24 NOTE — PROGRESS NOTES
"PRIMARY DIAGNOSIS: \"GENERIC\" NURSING  OUTPATIENT/OBSERVATION GOALS TO BE MET BEFORE DISCHARGE:  1. ADLs back to baseline: No    2. Activity and level of assistance: Up with standby assistance.    3. Pain status: Improved-controlled with oral pain medications.    4. Return to near baseline physical activity: Yes     Discharge Planner Nurse   Safe discharge environment identified: No  Barriers to discharge: Yes       Entered by: Roxy Garcia 11/24/2018 8:10 AM     Please review provider order for any additional goals.   Nurse to notify provider when observation goals have been met and patient is ready for discharge.  "

## 2018-11-25 ENCOUNTER — APPOINTMENT (OUTPATIENT)
Dept: GENERAL RADIOLOGY | Facility: CLINIC | Age: 73
End: 2018-11-25
Attending: PHYSICIAN ASSISTANT
Payer: COMMERCIAL

## 2018-11-25 LAB
ALBUMIN SERPL-MCNC: 2.5 G/DL (ref 3.4–5)
ALBUMIN UR-MCNC: 30 MG/DL
ALP SERPL-CCNC: 36 U/L (ref 40–150)
ALT SERPL W P-5'-P-CCNC: 13 U/L (ref 0–70)
ANION GAP SERPL CALCULATED.3IONS-SCNC: 5 MMOL/L (ref 3–14)
APPEARANCE UR: CLEAR
AST SERPL W P-5'-P-CCNC: 10 U/L (ref 0–45)
BACTERIA #/AREA URNS HPF: ABNORMAL /HPF
BASOPHILS # BLD AUTO: 0 10E9/L (ref 0–0.2)
BASOPHILS # BLD AUTO: 0.1 10E9/L (ref 0–0.2)
BASOPHILS NFR BLD AUTO: 0.2 %
BASOPHILS NFR BLD AUTO: 0.3 %
BILIRUB SERPL-MCNC: 1.7 MG/DL (ref 0.2–1.3)
BILIRUB UR QL STRIP: NEGATIVE
BUN SERPL-MCNC: 10 MG/DL (ref 7–30)
CALCIUM SERPL-MCNC: 7.7 MG/DL (ref 8.5–10.1)
CHLORIDE SERPL-SCNC: 106 MMOL/L (ref 94–109)
CO2 SERPL-SCNC: 29 MMOL/L (ref 20–32)
COLOR UR AUTO: YELLOW
CREAT SERPL-MCNC: 0.86 MG/DL (ref 0.66–1.25)
DIFFERENTIAL METHOD BLD: ABNORMAL
DIFFERENTIAL METHOD BLD: ABNORMAL
EOSINOPHIL # BLD AUTO: 0.1 10E9/L (ref 0–0.7)
EOSINOPHIL # BLD AUTO: 0.1 10E9/L (ref 0–0.7)
EOSINOPHIL NFR BLD AUTO: 0.4 %
EOSINOPHIL NFR BLD AUTO: 0.4 %
ERYTHROCYTE [DISTWIDTH] IN BLOOD BY AUTOMATED COUNT: 15.3 % (ref 10–15)
ERYTHROCYTE [DISTWIDTH] IN BLOOD BY AUTOMATED COUNT: 15.4 % (ref 10–15)
GFR SERPL CREATININE-BSD FRML MDRD: 88 ML/MIN/1.7M2
GLUCOSE BLDC GLUCOMTR-MCNC: 135 MG/DL (ref 70–99)
GLUCOSE BLDC GLUCOMTR-MCNC: 152 MG/DL (ref 70–99)
GLUCOSE BLDC GLUCOMTR-MCNC: 155 MG/DL (ref 70–99)
GLUCOSE BLDC GLUCOMTR-MCNC: 162 MG/DL (ref 70–99)
GLUCOSE BLDC GLUCOMTR-MCNC: 164 MG/DL (ref 70–99)
GLUCOSE BLDC GLUCOMTR-MCNC: 195 MG/DL (ref 70–99)
GLUCOSE SERPL-MCNC: 171 MG/DL (ref 70–99)
GLUCOSE UR STRIP-MCNC: NEGATIVE MG/DL
HCT VFR BLD AUTO: 35.6 % (ref 40–53)
HCT VFR BLD AUTO: 37.2 % (ref 40–53)
HGB BLD-MCNC: 11.6 G/DL (ref 13.3–17.7)
HGB BLD-MCNC: 12.2 G/DL (ref 13.3–17.7)
HGB UR QL STRIP: NEGATIVE
IMM GRANULOCYTES # BLD: 0.2 10E9/L (ref 0–0.4)
IMM GRANULOCYTES # BLD: 0.2 10E9/L (ref 0–0.4)
IMM GRANULOCYTES NFR BLD: 0.8 %
IMM GRANULOCYTES NFR BLD: 1.2 %
INR PPP: 2.09 (ref 0.86–1.14)
KETONES UR STRIP-MCNC: NEGATIVE MG/DL
LACTATE BLD-SCNC: 0.9 MMOL/L (ref 0.7–2)
LEUKOCYTE ESTERASE UR QL STRIP: NEGATIVE
LIPASE SERPL-CCNC: 662 U/L (ref 73–393)
LYMPHOCYTES # BLD AUTO: 2.3 10E9/L (ref 0.8–5.3)
LYMPHOCYTES # BLD AUTO: 2.9 10E9/L (ref 0.8–5.3)
LYMPHOCYTES NFR BLD AUTO: 12.2 %
LYMPHOCYTES NFR BLD AUTO: 15 %
MCH RBC QN AUTO: 32.3 PG (ref 26.5–33)
MCH RBC QN AUTO: 32.4 PG (ref 26.5–33)
MCHC RBC AUTO-ENTMCNC: 32.6 G/DL (ref 31.5–36.5)
MCHC RBC AUTO-ENTMCNC: 32.8 G/DL (ref 31.5–36.5)
MCV RBC AUTO: 99 FL (ref 78–100)
MCV RBC AUTO: 99 FL (ref 78–100)
MONOCYTES # BLD AUTO: 1.7 10E9/L (ref 0–1.3)
MONOCYTES # BLD AUTO: 2 10E9/L (ref 0–1.3)
MONOCYTES NFR BLD AUTO: 10.4 %
MONOCYTES NFR BLD AUTO: 8.7 %
MUCOUS THREADS #/AREA URNS LPF: PRESENT /LPF
NEUTROPHILS # BLD AUTO: 14.4 10E9/L (ref 1.6–8.3)
NEUTROPHILS # BLD AUTO: 14.6 10E9/L (ref 1.6–8.3)
NEUTROPHILS NFR BLD AUTO: 74.8 %
NEUTROPHILS NFR BLD AUTO: 75.6 %
NITRATE UR QL: NEGATIVE
NRBC # BLD AUTO: 0 10*3/UL
NRBC # BLD AUTO: 0 10*3/UL
NRBC BLD AUTO-RTO: 0 /100
NRBC BLD AUTO-RTO: 0 /100
PH UR STRIP: 7 PH (ref 5–7)
PLATELET # BLD AUTO: 189 10E9/L (ref 150–450)
PLATELET # BLD AUTO: 196 10E9/L (ref 150–450)
POTASSIUM SERPL-SCNC: 3.7 MMOL/L (ref 3.4–5.3)
PROT SERPL-MCNC: 6 G/DL (ref 6.8–8.8)
RBC # BLD AUTO: 3.59 10E12/L (ref 4.4–5.9)
RBC # BLD AUTO: 3.76 10E12/L (ref 4.4–5.9)
RBC #/AREA URNS AUTO: <1 /HPF (ref 0–2)
SODIUM SERPL-SCNC: 140 MMOL/L (ref 133–144)
SOURCE: ABNORMAL
SP GR UR STRIP: 1.01 (ref 1–1.03)
UROBILINOGEN UR STRIP-MCNC: 4 MG/DL (ref 0–2)
WBC # BLD AUTO: 19 10E9/L (ref 4–11)
WBC # BLD AUTO: 19.5 10E9/L (ref 4–11)
WBC #/AREA URNS AUTO: 8 /HPF (ref 0–5)

## 2018-11-25 PROCEDURE — 25000128 H RX IP 250 OP 636: Performed by: FAMILY MEDICINE

## 2018-11-25 PROCEDURE — 12000000 ZZH R&B MED SURG/OB

## 2018-11-25 PROCEDURE — 25000132 ZZH RX MED GY IP 250 OP 250 PS 637: Performed by: PHYSICIAN ASSISTANT

## 2018-11-25 PROCEDURE — 83690 ASSAY OF LIPASE: CPT | Performed by: PHYSICIAN ASSISTANT

## 2018-11-25 PROCEDURE — 83605 ASSAY OF LACTIC ACID: CPT | Performed by: FAMILY MEDICINE

## 2018-11-25 PROCEDURE — 25000128 H RX IP 250 OP 636: Performed by: PHYSICIAN ASSISTANT

## 2018-11-25 PROCEDURE — 85610 PROTHROMBIN TIME: CPT | Performed by: PHYSICIAN ASSISTANT

## 2018-11-25 PROCEDURE — 87040 BLOOD CULTURE FOR BACTERIA: CPT | Performed by: PHYSICIAN ASSISTANT

## 2018-11-25 PROCEDURE — 25000132 ZZH RX MED GY IP 250 OP 250 PS 637: Performed by: EMERGENCY MEDICINE

## 2018-11-25 PROCEDURE — 00000146 ZZHCL STATISTIC GLUCOSE BY METER IP

## 2018-11-25 PROCEDURE — 80053 COMPREHEN METABOLIC PANEL: CPT | Performed by: PHYSICIAN ASSISTANT

## 2018-11-25 PROCEDURE — 36415 COLL VENOUS BLD VENIPUNCTURE: CPT | Performed by: FAMILY MEDICINE

## 2018-11-25 PROCEDURE — 81001 URINALYSIS AUTO W/SCOPE: CPT | Performed by: PHYSICIAN ASSISTANT

## 2018-11-25 PROCEDURE — 85025 COMPLETE CBC W/AUTO DIFF WBC: CPT | Performed by: PHYSICIAN ASSISTANT

## 2018-11-25 PROCEDURE — 99232 SBSQ HOSP IP/OBS MODERATE 35: CPT | Performed by: FAMILY MEDICINE

## 2018-11-25 PROCEDURE — 84145 PROCALCITONIN (PCT): CPT | Performed by: PHYSICIAN ASSISTANT

## 2018-11-25 PROCEDURE — 36415 COLL VENOUS BLD VENIPUNCTURE: CPT | Performed by: PHYSICIAN ASSISTANT

## 2018-11-25 PROCEDURE — 71046 X-RAY EXAM CHEST 2 VIEWS: CPT

## 2018-11-25 PROCEDURE — 25000132 ZZH RX MED GY IP 250 OP 250 PS 637: Performed by: FAMILY MEDICINE

## 2018-11-25 RX ORDER — SODIUM CHLORIDE 9 MG/ML
INJECTION, SOLUTION INTRAVENOUS CONTINUOUS
Status: DISCONTINUED | OUTPATIENT
Start: 2018-11-25 | End: 2018-11-26

## 2018-11-25 RX ORDER — CEFAZOLIN SODIUM 1 G/50ML
2000 SOLUTION INTRAVENOUS ONCE
Status: COMPLETED | OUTPATIENT
Start: 2018-11-25 | End: 2018-11-26

## 2018-11-25 RX ORDER — CEFAZOLIN SODIUM 1 G/50ML
1750 SOLUTION INTRAVENOUS EVERY 12 HOURS
Status: DISCONTINUED | OUTPATIENT
Start: 2018-11-26 | End: 2018-11-27

## 2018-11-25 RX ORDER — WARFARIN SODIUM 2.5 MG/1
2.5 TABLET ORAL
Status: COMPLETED | OUTPATIENT
Start: 2018-11-25 | End: 2018-11-25

## 2018-11-25 RX ADMIN — GABAPENTIN 600 MG: 600 TABLET, FILM COATED ORAL at 22:19

## 2018-11-25 RX ADMIN — INSULIN ASPART 1 UNITS: 100 INJECTION, SOLUTION INTRAVENOUS; SUBCUTANEOUS at 11:23

## 2018-11-25 RX ADMIN — SODIUM CHLORIDE, POTASSIUM CHLORIDE, SODIUM LACTATE AND CALCIUM CHLORIDE: 600; 310; 30; 20 INJECTION, SOLUTION INTRAVENOUS at 16:13

## 2018-11-25 RX ADMIN — GABAPENTIN 300 MG: 300 CAPSULE ORAL at 14:47

## 2018-11-25 RX ADMIN — GABAPENTIN 300 MG: 300 CAPSULE ORAL at 22:19

## 2018-11-25 RX ADMIN — METOPROLOL SUCCINATE 75 MG: 50 TABLET, EXTENDED RELEASE ORAL at 19:59

## 2018-11-25 RX ADMIN — SODIUM CHLORIDE 1000 ML: 9 INJECTION, SOLUTION INTRAVENOUS at 22:21

## 2018-11-25 RX ADMIN — ACETAMINOPHEN 650 MG: 325 TABLET, FILM COATED ORAL at 19:59

## 2018-11-25 RX ADMIN — LISINOPRIL 5 MG: 5 TABLET ORAL at 08:30

## 2018-11-25 RX ADMIN — INSULIN ASPART 3 UNITS: 100 INJECTION, SOLUTION INTRAVENOUS; SUBCUTANEOUS at 03:18

## 2018-11-25 RX ADMIN — METOPROLOL SUCCINATE 75 MG: 50 TABLET, EXTENDED RELEASE ORAL at 08:29

## 2018-11-25 RX ADMIN — HYDROCODONE BITARTRATE AND ACETAMINOPHEN 1 TABLET: 5; 325 TABLET ORAL at 03:18

## 2018-11-25 RX ADMIN — MULTIPLE VITAMINS W/ MINERALS TAB 1 TABLET: TAB at 08:30

## 2018-11-25 RX ADMIN — SODIUM CHLORIDE, POTASSIUM CHLORIDE, SODIUM LACTATE AND CALCIUM CHLORIDE: 600; 310; 30; 20 INJECTION, SOLUTION INTRAVENOUS at 08:09

## 2018-11-25 RX ADMIN — INSULIN ASPART 1 UNITS: 100 INJECTION, SOLUTION INTRAVENOUS; SUBCUTANEOUS at 08:09

## 2018-11-25 RX ADMIN — WARFARIN SODIUM 2.5 MG: 2.5 TABLET ORAL at 18:31

## 2018-11-25 RX ADMIN — Medication 100 MG: at 08:30

## 2018-11-25 RX ADMIN — TAZOBACTAM SODIUM AND PIPERACILLIN SODIUM 4.5 G: 500; 4 INJECTION, SOLUTION INTRAVENOUS at 23:22

## 2018-11-25 RX ADMIN — GABAPENTIN 300 MG: 300 CAPSULE ORAL at 08:29

## 2018-11-25 RX ADMIN — TAMSULOSIN HYDROCHLORIDE 0.4 MG: 0.4 CAPSULE ORAL at 08:30

## 2018-11-25 RX ADMIN — FOLIC ACID 1 MG: 1 TABLET ORAL at 08:29

## 2018-11-25 RX ADMIN — SODIUM CHLORIDE, POTASSIUM CHLORIDE, SODIUM LACTATE AND CALCIUM CHLORIDE: 600; 310; 30; 20 INJECTION, SOLUTION INTRAVENOUS at 03:33

## 2018-11-25 ASSESSMENT — ACTIVITIES OF DAILY LIVING (ADL)
ADLS_ACUITY_SCORE: 11

## 2018-11-25 NOTE — PLAN OF CARE
Problem: Patient Care Overview  Goal: Plan of Care/Patient Progress Review  Outcome: Improving  Patient awake and alert during shift.  CIWA sores negative, reports last drink on 11/22/18.  Lung sound diminished in bases.  Abdomen distended slightly, pain in mid/left side, bowel sound hypoactive.  Patient having some pain in abdomen, controlled well with PRN Hydrocodone at 1718 and 2150.  Continue to be NPO, having sips of water for medications only.  Up ambulating in hallway this evening.

## 2018-11-25 NOTE — PROGRESS NOTES
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  He has history of necrotizing pancreatitis, pancreatic duct leak, and a history of a stent earlier in 2018, since removed.  He was abstinent from EtOH from Jan-July, then started using 3-4 beers/day starting in July.  He also stopped his pancreatic enzymes in June due to cost.  Had 2L NS in ER, then on NS at 200, switch to LR at 200 with plan to slow tomorrow.  On dilaudid and toradol prn for pain.    11/25/2018 -- no new necrosis on CT.  Patient markedly improved - tolerating clears - try fulls for dinner, plan for regular diet in AM if doing well.        Alcohol abuse/dependence  11/24/2018 -- started drinking again this past summer, with recent bout of pancreatitis due to this last month.  Says only 1-2 per day.  Discussed in detail today that he needs to remain completely abstinent from alcohol.  Patient expresses understanding and agreement.  Started on CIWA and oral vitamins.    11/25/2018 -- CIWAs 0 so far.          Hypertension, benign essential, goal below 140/90  11/24/2018 -- blood pressure intermittently elevated as it's been in the past - continue home lisinopril and metoprolol for now.     11/25/2018 -- doing good.       Hypertrophy of prostate without urinary obstruction  11/24/2018 -- continue home flomax.         CAD (coronary artery disease)/  Peripheral vascular disease (H)  11/24/2018 -- Non-Obstructive CAD seen on angiogram 2009, no stents or history of MI.  Not on statin for unclear reasons.  Not on ASA, on warfarin  11/25/2018 -- stable, asymptomatic.       Hyperlipidemia LDL goal <100  11/24/2018 -- not on a statin, defer to PCP.          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  Starting on high dose NPO sliding scale insulin.  A1c 7.6.    2018 -- good control - switching to 4 times daily high dose sliding scale insulin with increased oral intake.         Spleen absent  2018 --  WBC elevated but coming down, no evidence of symptoms of infection, appears stress response from acute pancreatitis.  Follow. No indication for antibiotics at present.    2018 -- WBC up today, but looks like this is mostly chronic - no signs of infection, clinically improving.  Follow         Chronic atrial fibrillation (H)  2018 -- INR now therapeutic, pharmacy to dose coumadin.   2018 --  Rate controlled.      Prophylaxis  On coumadin      Lines  PIV    Disposition  Hope for discharge tomorrow if tolerating regular diet.              Interval History:   Much improved.  Currently pain free, tolerating clears, feels improved.    No other pain             Review of Systems:    ROS: 10 point ROS neg other than the symptoms noted above in the HPI.             Medications:   Current active medications and PTA medications reviewed, see medication list for details.            Physical Exam:   Vitals were reviewed  Patient Vitals for the past 24 hrs:   BP Temp Temp src Pulse Resp SpO2   18 1131 120/58 98  F (36.7  C) Oral 90 16 90 %   18 0748 122/56 98  F (36.7  C) Oral 88 16 90 %   18 0402 127/64 98.6  F (37  C) Oral 93 18 91 %   18 0021 129/52 98.9  F (37.2  C) Oral 99 18 91 %   18 1933 134/61 100.2  F (37.9  C) Oral - 18 92 %       Temperatures:  Current - Temp: 98  F (36.7  C); Max - Temp  Av.7  F (37.1  C)  Min: 98  F (36.7  C)  Max: 100.2  F (37.9  C)  Respiration range: Resp  Av.2  Min: 16  Max: 18  Pulse range: Pulse  Av.5  Min: 88  Max:  99  Blood pressure range: Systolic (24hrs), Av , Min:120 , Max:134   ; Diastolic (24hrs), Av, Min:52, Max:64    Pulse oximetry range: SpO2  Av.8 %  Min: 90 %  Max: 92 %  I/O last 3 completed shifts:  In: 1770 [I.V.:1770]  Out: 675 [Urine:675]    Intake/Output Summary (Last 24 hours) at 18 1608  Last data filed at 18 0806   Gross per 24 hour   Intake             1770 ml   Output              525 ml   Net             1245 ml     EXAM:  General: awake and alert, NAD, oriented x 3  Head: normocephalic  Neck: unremarkable, no lymphadenopathy   HEENT: oropharynx pink and moist    Heart: Regular rate and rhythm, no murmurs, rubs, or gallops  Lungs: clear to auscultation bilaterally with good air movement throughout  Abdomen: soft, just minimal tenderness mid abdomen, no masses or organomegaly, normal bowel sounds   Extremities: no edema in lower extremities   Skin unremarkable.               Data:     Results for orders placed or performed during the hospital encounter of 18 (from the past 24 hour(s))   Glucose by meter   Result Value Ref Range    Glucose 163 (H) 70 - 99 mg/dL   Glucose by meter   Result Value Ref Range    Glucose 137 (H) 70 - 99 mg/dL   Glucose by meter   Result Value Ref Range    Glucose 164 (H) 70 - 99 mg/dL   Glucose by meter   Result Value Ref Range    Glucose 195 (H) 70 - 99 mg/dL   INR   Result Value Ref Range    INR 2.09 (H) 0.86 - 1.14   CBC with platelets differential   Result Value Ref Range    WBC 19.5 (H) 4.0 - 11.0 10e9/L    RBC Count 3.76 (L) 4.4 - 5.9 10e12/L    Hemoglobin 12.2 (L) 13.3 - 17.7 g/dL    Hematocrit 37.2 (L) 40.0 - 53.0 %    MCV 99 78 - 100 fl    MCH 32.4 26.5 - 33.0 pg    MCHC 32.8 31.5 - 36.5 g/dL    RDW 15.4 (H) 10.0 - 15.0 %    Platelet Count 189 150 - 450 10e9/L    Diff Method Automated Method     % Neutrophils 74.8 %    % Lymphocytes 15.0 %    % Monocytes 8.7 %    % Eosinophils 0.4 %    % Basophils 0.3 %    % Immature Granulocytes 0.8 %     Nucleated RBCs 0 0 /100    Absolute Neutrophil 14.6 (H) 1.6 - 8.3 10e9/L    Absolute Lymphocytes 2.9 0.8 - 5.3 10e9/L    Absolute Monocytes 1.7 (H) 0.0 - 1.3 10e9/L    Absolute Eosinophils 0.1 0.0 - 0.7 10e9/L    Absolute Basophils 0.1 0.0 - 0.2 10e9/L    Abs Immature Granulocytes 0.2 0 - 0.4 10e9/L    Absolute Nucleated RBC 0.0    Comprehensive metabolic panel   Result Value Ref Range    Sodium 140 133 - 144 mmol/L    Potassium 3.7 3.4 - 5.3 mmol/L    Chloride 106 94 - 109 mmol/L    Carbon Dioxide 29 20 - 32 mmol/L    Anion Gap 5 3 - 14 mmol/L    Glucose 171 (H) 70 - 99 mg/dL    Urea Nitrogen 10 7 - 30 mg/dL    Creatinine 0.86 0.66 - 1.25 mg/dL    GFR Estimate 88 >60 mL/min/1.7m2    GFR Estimate If Black >90 >60 mL/min/1.7m2    Calcium 7.7 (L) 8.5 - 10.1 mg/dL    Bilirubin Total 1.7 (H) 0.2 - 1.3 mg/dL    Albumin 2.5 (L) 3.4 - 5.0 g/dL    Protein Total 6.0 (L) 6.8 - 8.8 g/dL    Alkaline Phosphatase 36 (L) 40 - 150 U/L    ALT 13 0 - 70 U/L    AST 10 0 - 45 U/L   Lipase   Result Value Ref Range    Lipase 662 (H) 73 - 393 U/L   Glucose by meter   Result Value Ref Range    Glucose 152 (H) 70 - 99 mg/dL   Glucose by meter   Result Value Ref Range    Glucose 162 (H) 70 - 99 mg/dL           Attestation:  I have reviewed today's vital signs, notes, medications, labs and imaging.  Amount of time performed on this daily note: 25 minutes.     Willam Nevarez MD, MD

## 2018-11-25 NOTE — PLAN OF CARE
Problem: Pancreatitis, Acute/Chronic (Adult)  Goal: Signs and Symptoms of Listed Potential Problems Will be Absent, Minimized or Managed (Pancreatitis, Acute/Chronic)  Signs and symptoms of listed potential problems will be absent, minimized or managed by discharge/transition of care (reference Pancreatitis, Acute/Chronic (Adult) CPG).   Outcome: Improving  Pt up in room independently. Tolerating PO clear fluids well. Denies pain. BS hypoactive. Urine continues to be tyrone colored. IVF infusing. CIWA 0. Spouse present at bedside. VSS. Lauren Hemphill RN

## 2018-11-25 NOTE — PLAN OF CARE
Problem: Patient Care Overview  Goal: Plan of Care/Patient Progress Review  Outcome: No Change  Request pain med for left abd. Pain, very pleasant, awake reading from device. States pain med. Working well.

## 2018-11-26 ENCOUNTER — PATIENT OUTREACH (OUTPATIENT)
Dept: CARE COORDINATION | Facility: CLINIC | Age: 73
End: 2018-11-26

## 2018-11-26 LAB
ALBUMIN SERPL-MCNC: 2.4 G/DL (ref 3.4–5)
ALP SERPL-CCNC: 46 U/L (ref 40–150)
ALT SERPL W P-5'-P-CCNC: 13 U/L (ref 0–70)
ANION GAP SERPL CALCULATED.3IONS-SCNC: 7 MMOL/L (ref 3–14)
AST SERPL W P-5'-P-CCNC: 14 U/L (ref 0–45)
BILIRUB SERPL-MCNC: 1.8 MG/DL (ref 0.2–1.3)
BUN SERPL-MCNC: 8 MG/DL (ref 7–30)
CALCIUM SERPL-MCNC: 7.8 MG/DL (ref 8.5–10.1)
CHLORIDE SERPL-SCNC: 107 MMOL/L (ref 94–109)
CO2 SERPL-SCNC: 27 MMOL/L (ref 20–32)
CREAT SERPL-MCNC: 0.88 MG/DL (ref 0.66–1.25)
ERYTHROCYTE [DISTWIDTH] IN BLOOD BY AUTOMATED COUNT: 15.5 % (ref 10–15)
GFR SERPL CREATININE-BSD FRML MDRD: 86 ML/MIN/1.7M2
GLUCOSE BLDC GLUCOMTR-MCNC: 132 MG/DL (ref 70–99)
GLUCOSE BLDC GLUCOMTR-MCNC: 135 MG/DL (ref 70–99)
GLUCOSE BLDC GLUCOMTR-MCNC: 159 MG/DL (ref 70–99)
GLUCOSE BLDC GLUCOMTR-MCNC: 161 MG/DL (ref 70–99)
GLUCOSE BLDC GLUCOMTR-MCNC: 162 MG/DL (ref 70–99)
GLUCOSE SERPL-MCNC: 146 MG/DL (ref 70–99)
HCT VFR BLD AUTO: 35.9 % (ref 40–53)
HGB BLD-MCNC: 11.7 G/DL (ref 13.3–17.7)
INR PPP: 2.02 (ref 0.86–1.14)
LACTATE BLD-SCNC: 1.1 MMOL/L (ref 0.7–2)
LIPASE SERPL-CCNC: 215 U/L (ref 73–393)
MCH RBC QN AUTO: 32.7 PG (ref 26.5–33)
MCHC RBC AUTO-ENTMCNC: 32.6 G/DL (ref 31.5–36.5)
MCV RBC AUTO: 100 FL (ref 78–100)
PLATELET # BLD AUTO: 197 10E9/L (ref 150–450)
POTASSIUM SERPL-SCNC: 3.8 MMOL/L (ref 3.4–5.3)
PROCALCITONIN SERPL-MCNC: 0.13 NG/ML
PROT SERPL-MCNC: 6.4 G/DL (ref 6.8–8.8)
RBC # BLD AUTO: 3.58 10E12/L (ref 4.4–5.9)
SODIUM SERPL-SCNC: 141 MMOL/L (ref 133–144)
WBC # BLD AUTO: 18.2 10E9/L (ref 4–11)

## 2018-11-26 PROCEDURE — 85027 COMPLETE CBC AUTOMATED: CPT | Performed by: PHYSICIAN ASSISTANT

## 2018-11-26 PROCEDURE — 25000132 ZZH RX MED GY IP 250 OP 250 PS 637: Performed by: PHYSICIAN ASSISTANT

## 2018-11-26 PROCEDURE — 25000132 ZZH RX MED GY IP 250 OP 250 PS 637

## 2018-11-26 PROCEDURE — 00000146 ZZHCL STATISTIC GLUCOSE BY METER IP

## 2018-11-26 PROCEDURE — 25000132 ZZH RX MED GY IP 250 OP 250 PS 637: Performed by: EMERGENCY MEDICINE

## 2018-11-26 PROCEDURE — 80053 COMPREHEN METABOLIC PANEL: CPT | Performed by: PHYSICIAN ASSISTANT

## 2018-11-26 PROCEDURE — 25000128 H RX IP 250 OP 636: Performed by: PHYSICIAN ASSISTANT

## 2018-11-26 PROCEDURE — 85610 PROTHROMBIN TIME: CPT | Performed by: PHYSICIAN ASSISTANT

## 2018-11-26 PROCEDURE — 25000132 ZZH RX MED GY IP 250 OP 250 PS 637: Performed by: FAMILY MEDICINE

## 2018-11-26 PROCEDURE — 25000128 H RX IP 250 OP 636: Performed by: FAMILY MEDICINE

## 2018-11-26 PROCEDURE — 12000000 ZZH R&B MED SURG/OB

## 2018-11-26 PROCEDURE — 83690 ASSAY OF LIPASE: CPT | Performed by: PHYSICIAN ASSISTANT

## 2018-11-26 PROCEDURE — 99233 SBSQ HOSP IP/OBS HIGH 50: CPT

## 2018-11-26 PROCEDURE — 36415 COLL VENOUS BLD VENIPUNCTURE: CPT | Performed by: PHYSICIAN ASSISTANT

## 2018-11-26 PROCEDURE — 83605 ASSAY OF LACTIC ACID: CPT | Performed by: PHYSICIAN ASSISTANT

## 2018-11-26 RX ORDER — WARFARIN SODIUM 3 MG/1
3 TABLET ORAL
Status: COMPLETED | OUTPATIENT
Start: 2018-11-26 | End: 2018-11-26

## 2018-11-26 RX ORDER — METFORMIN HCL 500 MG
1000 TABLET, EXTENDED RELEASE 24 HR ORAL 2 TIMES DAILY WITH MEALS
Status: DISCONTINUED | OUTPATIENT
Start: 2018-11-26 | End: 2018-11-27 | Stop reason: HOSPADM

## 2018-11-26 RX ADMIN — METFORMIN HYDROCHLORIDE 1000 MG: 500 TABLET, EXTENDED RELEASE ORAL at 12:03

## 2018-11-26 RX ADMIN — LISINOPRIL 5 MG: 5 TABLET ORAL at 08:01

## 2018-11-26 RX ADMIN — ACETAMINOPHEN 650 MG: 325 TABLET, FILM COATED ORAL at 15:35

## 2018-11-26 RX ADMIN — GABAPENTIN 300 MG: 300 CAPSULE ORAL at 21:48

## 2018-11-26 RX ADMIN — METOPROLOL SUCCINATE 75 MG: 50 TABLET, EXTENDED RELEASE ORAL at 08:01

## 2018-11-26 RX ADMIN — VANCOMYCIN HYDROCHLORIDE 1750 MG: 10 INJECTION, POWDER, LYOPHILIZED, FOR SOLUTION INTRAVENOUS at 11:24

## 2018-11-26 RX ADMIN — TAZOBACTAM SODIUM AND PIPERACILLIN SODIUM 4.5 G: 500; 4 INJECTION, SOLUTION INTRAVENOUS at 15:37

## 2018-11-26 RX ADMIN — FOLIC ACID 1 MG: 1 TABLET ORAL at 08:00

## 2018-11-26 RX ADMIN — SODIUM CHLORIDE: 9 INJECTION, SOLUTION INTRAVENOUS at 09:52

## 2018-11-26 RX ADMIN — METFORMIN HYDROCHLORIDE 1000 MG: 500 TABLET, EXTENDED RELEASE ORAL at 17:34

## 2018-11-26 RX ADMIN — TAZOBACTAM SODIUM AND PIPERACILLIN SODIUM 4.5 G: 500; 4 INJECTION, SOLUTION INTRAVENOUS at 05:36

## 2018-11-26 RX ADMIN — Medication 100 MG: at 08:00

## 2018-11-26 RX ADMIN — TAZOBACTAM SODIUM AND PIPERACILLIN SODIUM 4.5 G: 500; 4 INJECTION, SOLUTION INTRAVENOUS at 21:53

## 2018-11-26 RX ADMIN — GABAPENTIN 300 MG: 300 CAPSULE ORAL at 13:44

## 2018-11-26 RX ADMIN — METOPROLOL SUCCINATE 75 MG: 50 TABLET, EXTENDED RELEASE ORAL at 21:48

## 2018-11-26 RX ADMIN — GABAPENTIN 600 MG: 600 TABLET, FILM COATED ORAL at 21:48

## 2018-11-26 RX ADMIN — VANCOMYCIN HYDROCHLORIDE 2000 MG: 10 INJECTION, POWDER, LYOPHILIZED, FOR SOLUTION INTRAVENOUS at 00:10

## 2018-11-26 RX ADMIN — GABAPENTIN 300 MG: 300 CAPSULE ORAL at 08:01

## 2018-11-26 RX ADMIN — WARFARIN SODIUM 3 MG: 3 TABLET ORAL at 18:22

## 2018-11-26 RX ADMIN — SODIUM CHLORIDE: 9 INJECTION, SOLUTION INTRAVENOUS at 02:42

## 2018-11-26 RX ADMIN — TAZOBACTAM SODIUM AND PIPERACILLIN SODIUM 4.5 G: 500; 4 INJECTION, SOLUTION INTRAVENOUS at 10:47

## 2018-11-26 RX ADMIN — MULTIPLE VITAMINS W/ MINERALS TAB 1 TABLET: TAB at 08:00

## 2018-11-26 RX ADMIN — TAMSULOSIN HYDROCHLORIDE 0.4 MG: 0.4 CAPSULE ORAL at 08:01

## 2018-11-26 ASSESSMENT — ACTIVITIES OF DAILY LIVING (ADL)
ADLS_ACUITY_SCORE: 11

## 2018-11-26 NOTE — PHARMACY-VANCOMYCIN DOSING SERVICE
Pharmacy Vancomycin Initial Note  Date of Service 2018  Patient's  1945  72 year old, male    Indication: Sepsis, unknown source    Current estimated CrCl = Estimated Creatinine Clearance: 89.4 mL/min (based on Cr of 0.86).    Creatinine for last 3 days  2018:  6:57 PM Creatinine 0.89 mg/dL  2018:  5:30 AM Creatinine 0.89 mg/dL  2018:  5:27 AM Creatinine 0.86 mg/dL    Recent Vancomycin Level(s) for last 3 days  No results found for requested labs within last 72 hours.      Vancomycin IV Administrations (past 72 hours)      No vancomycin orders with administrations in past 72 hours.                Nephrotoxins and other renal medications (Future)    Start     Dose/Rate Route Frequency Ordered Stop    18 1200  vancomycin (VANCOCIN) 1,750 mg in sodium chloride 0.9 % 500 mL intermittent infusion      1,750 mg  over 2 Hours Intravenous EVERY 12 HOURS 18 2238      18 2245  vancomycin (VANCOCIN) 2,000 mg in sodium chloride 0.9 % 500 mL intermittent infusion      2,000 mg  over 2 Hours Intravenous ONCE 18 2238      18 2200  piperacillin-tazobactam (ZOSYN) intermittent infusion 4.5 g      4.5 g  200 mL/hr over 30 Minutes Intravenous EVERY 6 HOURS 18 2146      18 0800  lisinopril (PRINIVIL/ZESTRIL) tablet 5 mg      5 mg Oral DAILY 18 0048            Contrast Orders - past 72 hours (72h ago through future)    Start     Dose/Rate Route Frequency Ordered Stop    18 1130  iopamidol (ISOVUE-370) solution 95 mL      95 mL Intravenous ONCE 18 1122 18 1139                Plan:  1.  Start vancomycin  2000 mg IV x1 loading dose then 1750mg IV q12h.   2.  Goal Trough Level: 15-20 mg/L   3.  Pharmacy will check trough levels as appropriate in 1-3 Days.    4. Serum creatinine levels will be ordered daily for the first week of therapy and at least twice weekly for subsequent weeks.    5. Dillwyn method utilized to dose vancomycin  therapy: Method 1    Kayy Ramirez

## 2018-11-26 NOTE — PROGRESS NOTES
[facility]    Sepsis Evaluation Progress Note    Date of Service: 11/25/2018    I was called to see Antoine LOPEZ Rafaela due to abnormal vital signs triggering the Sepsis SIRS screening alert. He is not known to have an infection.     Physical Exam    Vital Signs:  Temp: 101.8  F (38.8  C) Temp src: Oral BP: 135/60 Pulse: 102   Resp: 18 SpO2: 91 % O2 Device: Nasal cannula Oxygen Delivery: 3 LPM    Lab:  Lactic Acid   Date Value Ref Range Status   11/23/2018 1.7 0.7 - 2.0 mmol/L Final     Lactate for Sepsis Protocol   Date Value Ref Range Status   11/25/2018 0.9 0.7 - 2.0 mmol/L Final     The patient is at baseline mental status.    The rest of their physical exam is significant for fever, hypoxia (86% on RA, 91% with 3LPM O2), basilar crackles. Denies chest pain, dyspnea, cough, calf pain/swelling, increase in abdominal pain, urinary symptoms.    CXR shows a new left pleural effusion.  WBC 19.0, down from 19.5 this morning.  UA, procalcitonin, blood cultures are pending.    Ordered Zosyn and Vanco.    Discussed with Dr. Correa.    Assessment and Plan    The SIRS and exam findings are likely due to sepsis.    ID: The patient is currently on the following antibiotics:  Anti-infectives     None        Current antibiotic coverage requires additional antibiotics for unknown source.    Fluid: Fluid bolus ordered.    Lab: Repeat lactic acid ordered for 2 hours from now.    Disposition: The patient will remain on the current unit. We will continue to monitor this patient closely.    Fracisco Harris PA-C

## 2018-11-26 NOTE — PLAN OF CARE
Problem: Patient Care Overview  Goal: Plan of Care/Patient Progress Review  Outcome: Improving  Up indep. Denies needing pain med. Also denies having nausea, does have loose/watery stool but has only had liquid diet so far, will have solid food for breakfast.

## 2018-11-26 NOTE — PLAN OF CARE
Problem: Patient Care Overview  Goal: Plan of Care/Patient Progress Review  Outcome: Improving  Patient ambulating in mosher independently.  Sats 92% on room air.  Denies pain.  Tolerating diabetic diet.  Afebrile this shift.

## 2018-11-26 NOTE — PROGRESS NOTES
Clinic Care Coordination Contact    Received a CTS from the hospital discharge team.  Per note it said pt did not want a phone call form care coordination.  Call placed to CTS team to verify and they said pt did not want a call from CC.    Will not call and will not send introduction letter per pt request.     JAMMIE Estes, Clinic Care Coordinator 11/26/2018   2:48 PM  [phone]

## 2018-11-26 NOTE — CONSULTS
CARE TRANSITION SOCIAL WORK INITIAL ASSESSMENT:  Reason For Consult: discharge planning   Met with: Patient and Family.    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 Katie Gross  Contact information and PCP information verified: Yes      ASSESSMENT  Cognitive Status: awake, alert and oriented.             Lives With: spouse  Living Arrangements: house  Quality Of Family Relationships: supportive, helpful, involved  Description of Support System: Supportive, Involved   Who is your support system?: Wife, Children   Support Assessment: Adequate family and caregiver support, Adequate social supports   Insurance Concerns: No Insurance issues identified        This writer met with pt and wife, Amina introduced self and role. Discussed discharge planning and medicare guidelines in regards to home care and SNF benefits. Patient lives in a house with his helpful wife, Amina. Patient has one son who lives in [place] and is supportive but unable to help. Patient currently has no services and does not want a care coordinator to follow up upon discharge. Patient feels safe at home and wishes to return home with no needs.      PT will evaulate, anticipate no discharge  needs.     CD resources provided in pts discharge instructions as MD indicated that pt uses alcohol.      PLAN    Home      Carol Ann Gloria  Social Work [facility] [phone]  [facility] [phone]

## 2018-11-26 NOTE — PLAN OF CARE
Problem: Patient Care Overview  Goal: Plan of Care/Patient Progress Review  Outcome: No Change  Patient awake and alert during shift.  Patient lungs sounds diminished in bases.  Abdomen soft, rounded, non-tender.  Patient tolerating full liquid diet well.  Patient reports no pain.  CIWA 0 all shift.  When checking CIWA and vitals at 2000 patient was found to be tachycardic, temp of 101.8 oral,  with an oxygen level of 85% on RA, patient denied any chest pain or shortness of breath.  Oxygen placed at 3 LPM with oxygen results of 91-92%.  MD paged - labs and x-ray ordered.  IV fluid bolus and IV antibiotics started per orders.  Oxygen levels improved, patient oxygen leveldecreased to 2 LPM via NC.  Ana Meyer RN 1:05 AM 11/26/2018

## 2018-11-26 NOTE — PLAN OF CARE
Patient vitals checked.  Patient triggered sepsis protocol, temp 101.8 orally, tachycardic, oxygen down to 85% on RA, up to 91% on 3 LPM.  Lactic ordered, vitals ordered, MD paged. Ana Meyer RN 8:23 PM 11/25/2018

## 2018-11-26 NOTE — PROGRESS NOTES
"[facility] Hospitalist Progress Note           Assessment and Plan:     Systemic inflammatory response syndrome due to pancreatitis vs sepsis unknown source  Developed fever 11/25/18 PM,  T 101.8, , R 20 meeting SIRS criteria, lactate was normal at 1.1 at the time.  No obvious source of infection, procalcitonin only minimally elevated at 0.13.  WBC elevated since admission though have been attributing that to pancreatitis.  Empiric vanco and Zosyn started 11/25/18.  Blood cultures negative to date.  - Await blood culture results tomorrow, but if no additional fever and blood cultures negative, will likely stop empiric antibiotics.    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  He has history of necrotizing pancreatitis, pancreatic duct leak, and a history of a stent earlier in 2018, since removed.  He was abstinent from EtOH from Jan-July, then started using 3-4 beers/day starting in July.  He also stopped his pancreatic enzymes in June due to cost.  Had 2L NS in ER, then on NS at 200, switch to LR at 200 with plan to slow tomorrow.  On dilaudid and toradol prn for pain.    11/25/2018 -- no new necrosis on CT.  Patient markedly improved - tolerating clears - try fulls for dinner, plan for regular diet in AM if doing well.     11/26/2018 - Having \"no\" pain, no analgesic needs for > 36 hours.  Tolerating general diet without additional pain, nausea, emesis.  Can likely discharge home tomorrow if fever resolves, see above.     Alcohol abuse/dependence  11/24/2018 -- started drinking again this past summer, with recent bout of pancreatitis due to this last month.  Says only 1-2 per day.  Discussed in detail today that he needs to remain completely abstinent from alcohol.  Patient expresses understanding and " agreement.  Started on CIWA and oral vitamins.    11/25/2018 -- CIWAs 0 so far.    11/26/2018 - CIWA remains zero, no Rx has been required.  Will discontinue CIWA monitoring.        Hypertension, benign essential, goal below 140/90  11/24/2018 -- blood pressure intermittently elevated as it's been in the past - continue home lisinopril and metoprolol for now.     11/25/2018 -- doing good.  11/26/2018 - Good BP control on lisinopril and metoprolol.       Hypertrophy of prostate without urinary obstruction  11/24/2018 -- continue home flomax.         CAD (coronary artery disease)/  Peripheral vascular disease (H)  11/24/2018 -- Non-Obstructive CAD seen on angiogram 2009, no stents or history of MI.  Not on statin for unclear reasons.  Not on ASA, on warfarin  11/25/2018 -- stable, asymptomatic.       Hyperlipidemia LDL goal <100  11/24/2018 -- not on a statin, defer to PCP.          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  Starting on high dose NPO sliding scale insulin.  A1c 7.6.    11/25/2018 -- good control - switching to 4 times daily high dose sliding scale insulin with increased oral intake.    11/26/2018 - Glucose control remains good on prandial and HS Novolog.  Now that his food intake is nearly back to normal, will resume metformin 1000 mg BID, monitor glucose response.       Spleen absent  11/24/2018 --  WBC elevated but coming down, no evidence of symptoms of infection, appears stress response from acute pancreatitis.  Follow. No indication for antibiotics at present.    11/25/2018 -- WBC up today, but looks like this is mostly chronic - no signs of infection, clinically improving.  Follow.    11/26/18 - Stable, chronic leukocytosis noted.  No additional workup planned.       Chronic atrial fibrillation  "(H)  11/24/2018 -- INR now therapeutic, pharmacy to dose coumadin.   11/25/2018 --  Rate controlled.    11/26/2018 - Rate continues well-controlled with metoprolol, warfarin continues.    Prophylaxis  On coumadin.      Lines  PIV    Disposition  Hope for discharge tomorrow if no recurrence of fever and cultures negative.            Interval History:   Reports \"no\" abdominal pain.  Eating well, no nausea or emesis.              Review of Systems:   ROS:  CONSTITUTIONAL: NEGATIVE  for chills or recurrence of fever.  EYES: NEGATIVE for visual changes, eye irritation.  ENT/MOUTH: Notable for nasal congestion which he attributes to O2, negative for postnasal drainage or sinus pressure  RESP: NEGATIVE for dyspnea, cough, wheeze, or respiratory chest pain   CV: NEGATIVE for chest pain, palpitations, orthopnea.  GI: See above.  : NEGATIVE for difficulties initiating urine stream.  MUSCULOSKELETAL: NEGATIVE for back pain, joint pain, or joint swelling  NEURO: NEGATIVE for focal numbness or weakness, syncope, stroke or seizure disorder.  PSYCHIATRIC: NEGATIVE for anxiety or depression, panic, or recent change in mood.              Medications:   Current active medications and PTA medications reviewed, see medication list for details.            Physical Exam:   Vitals were reviewed  Patient Vitals for the past 24 hrs:   BP Temp Temp src Pulse Resp SpO2   11/26/18 1104 120/59 - - 84 20 -   11/26/18 0749 133/62 98.3  F (36.8  C) Oral 89 20 94 %   11/26/18 0349 118/59 98.3  F (36.8  C) Oral 79 20 96 %   11/25/18 2336 119/58 97.7  F (36.5  C) Oral 77 20 96 %   11/25/18 2310 110/57 98.4  F (36.9  C) Oral 86 18 94 %   11/25/18 2110 137/58 98.4  F (36.9  C) Oral 95 20 92 %   11/25/18 2042 134/66 99.9  F (37.7  C) Oral 98 20 93 %   11/25/18 1955 - - - - - 91 %   11/25/18 1953 135/60 101.8  F (38.8  C) Oral 102 18 (!) 86 %   11/25/18 1600 139/59 99.8  F (37.7  C) Oral 93 18 93 %   11/25/18 1131 120/58 98  F (36.7  C) Oral 90 16 90 % "       Temperatures:  Current - Temp: 98  F (36.7  C); Max - Temp  Av.7  F (37.1  C)  Min: 98  F (36.7  C)  Max: 100.2  F (37.9  C)  Respiration range: Resp  Av.2  Min: 16  Max: 18  Pulse range: Pulse  Av.5  Min: 88  Max: 99  Blood pressure range: Systolic (24hrs), Av , Min:120 , Max:134   ; Diastolic (24hrs), Av, Min:52, Max:64    Pulse oximetry range: SpO2  Av.8 %  Min: 90 %  Max: 92 %  I/O last 3 completed shifts:  In: 4752 [P.O.:750; I.V.:4002]  Out: 2250 [Urine:2250]      Intake/Output Summary (Last 24 hours) at 18 1150  Last data filed at 18 0850   Gross per 24 hour   Intake             4303 ml   Output             2250 ml   Net             2053 ml       GENERAL: Pleasant man seated on the edge of the bed, looks comfortable.  EYES: Eyes grossly normal to inspection, extraocular movements intact  HENT: Nares congested bilaterally, clear discharge present.  NECK: Trachea midline, no stridor.    RESP: No accessory muscle use.  Symmetrical breath sounds.  Aside from a few inspiratory crackles at the left lateral base, lungs clear throughout on inspiration and expiration.  Expiration not prolonged, no wheeze.  CV: Regular rate and rhythm, occasional extrasystole, non-tachycardic.  Normal S1 S2, no murmur or extra sound.  Trace bilateral lower extremity edema.  ABDOMEN: Soft, non-tender, no guarding.  Liver and spleen not palpable, no masses palpable.  Bowel sounds positive.  MS: No bony deformities noted.  No red or inflamed joints.  SKIN: Warm and dry, no rashes.  NEURO: Alert, oriented, conversant.  Cranial nerves II - XII grossly intact.  No gross motor or sensory deficits.    PSYCH: Calm, alert, conversant.  Able to articulate logical thoughts, no tangential thoughts, no hallucinations or delusions.  Affect normal.                Data:     Results for orders placed or performed during the hospital encounter of 18 (from the past 24 hour(s))   Glucose by meter    Result Value Ref Range    Glucose 162 (H) 70 - 99 mg/dL   Glucose by meter   Result Value Ref Range    Glucose 135 (H) 70 - 99 mg/dL   Lactic acid level STAT for sepsis protocol   Result Value Ref Range    Lactate for Sepsis Protocol 0.9 0.7 - 2.0 mmol/L   CBC with platelets differential   Result Value Ref Range    WBC 19.0 (H) 4.0 - 11.0 10e9/L    RBC Count 3.59 (L) 4.4 - 5.9 10e12/L    Hemoglobin 11.6 (L) 13.3 - 17.7 g/dL    Hematocrit 35.6 (L) 40.0 - 53.0 %    MCV 99 78 - 100 fl    MCH 32.3 26.5 - 33.0 pg    MCHC 32.6 31.5 - 36.5 g/dL    RDW 15.3 (H) 10.0 - 15.0 %    Platelet Count 196 150 - 450 10e9/L    Diff Method Automated Method     % Neutrophils 75.6 %    % Lymphocytes 12.2 %    % Monocytes 10.4 %    % Eosinophils 0.4 %    % Basophils 0.2 %    % Immature Granulocytes 1.2 %    Nucleated RBCs 0 0 /100    Absolute Neutrophil 14.4 (H) 1.6 - 8.3 10e9/L    Absolute Lymphocytes 2.3 0.8 - 5.3 10e9/L    Absolute Monocytes 2.0 (H) 0.0 - 1.3 10e9/L    Absolute Eosinophils 0.1 0.0 - 0.7 10e9/L    Absolute Basophils 0.0 0.0 - 0.2 10e9/L    Abs Immature Granulocytes 0.2 0 - 0.4 10e9/L    Absolute Nucleated RBC 0.0    Procalcitonin   Result Value Ref Range    Procalcitonin 0.13 ng/ml   Chest 2 Views*    Narrative    CHEST TWO VIEWS   11/25/2018 9:06 PM    HISTORY: New hypoxia and fever.    COMPARISON: 3/21/2018.      Impression    IMPRESSION: There has been development of a small left pleural  effusion with progression of moderate left basilar atelectasis or  infiltrate. The right lung is clear. No other change or abnormality is  seen.      JUNI SANTOS MD   UA with Microscopic reflex to Culture   Result Value Ref Range    Color Urine Yellow     Appearance Urine Clear     Glucose Urine Negative NEG^Negative mg/dL    Bilirubin Urine Negative NEG^Negative    Ketones Urine Negative NEG^Negative mg/dL    Specific Gravity Urine 1.010 1.003 - 1.035    Blood Urine Negative NEG^Negative    pH Urine 7.0 5.0 - 7.0 pH     Protein Albumin Urine 30 (A) NEG^Negative mg/dL    Urobilinogen mg/dL 4.0 (H) 0.0 - 2.0 mg/dL    Nitrite Urine Negative NEG^Negative    Leukocyte Esterase Urine Negative NEG^Negative    Source Catheterized Urine     WBC Urine 8 (H) 0 - 5 /HPF    RBC Urine <1 0 - 2 /HPF    Bacteria Urine Few (A) NEG^Negative /HPF    Mucous Urine Present (A) NEG^Negative /LPF   Glucose by meter   Result Value Ref Range    Glucose 155 (H) 70 - 99 mg/dL   Blood culture   Result Value Ref Range    Specimen Description Blood Right Hand     Special Requests Aerobic and anaerobic bottles received     Culture Micro No growth after 3 hours    Blood culture   Result Value Ref Range    Specimen Description Blood Left Hand     Special Requests Aerobic and anaerobic bottles received     Culture Micro No growth after 3 hours    Glucose by meter   Result Value Ref Range    Glucose 162 (H) 70 - 99 mg/dL   INR   Result Value Ref Range    INR 2.02 (H) 0.86 - 1.14   CBC with platelets   Result Value Ref Range    WBC 18.2 (H) 4.0 - 11.0 10e9/L    RBC Count 3.58 (L) 4.4 - 5.9 10e12/L    Hemoglobin 11.7 (L) 13.3 - 17.7 g/dL    Hematocrit 35.9 (L) 40.0 - 53.0 %     78 - 100 fl    MCH 32.7 26.5 - 33.0 pg    MCHC 32.6 31.5 - 36.5 g/dL    RDW 15.5 (H) 10.0 - 15.0 %    Platelet Count 197 150 - 450 10e9/L   Lipase   Result Value Ref Range    Lipase 215 73 - 393 U/L   Comprehensive metabolic panel   Result Value Ref Range    Sodium 141 133 - 144 mmol/L    Potassium 3.8 3.4 - 5.3 mmol/L    Chloride 107 94 - 109 mmol/L    Carbon Dioxide 27 20 - 32 mmol/L    Anion Gap 7 3 - 14 mmol/L    Glucose 146 (H) 70 - 99 mg/dL    Urea Nitrogen 8 7 - 30 mg/dL    Creatinine 0.88 0.66 - 1.25 mg/dL    GFR Estimate 86 >60 mL/min/1.7m2    GFR Estimate If Black >90 >60 mL/min/1.7m2    Calcium 7.8 (L) 8.5 - 10.1 mg/dL    Bilirubin Total 1.8 (H) 0.2 - 1.3 mg/dL    Albumin 2.4 (L) 3.4 - 5.0 g/dL    Protein Total 6.4 (L) 6.8 - 8.8 g/dL    Alkaline Phosphatase 46 40 - 150 U/L     ALT 13 0 - 70 U/L    AST 14 0 - 45 U/L   Lactic acid whole blood   Result Value Ref Range    Lactic Acid 1.1 0.7 - 2.0 mmol/L   Glucose by meter   Result Value Ref Range    Glucose 132 (H) 70 - 99 mg/dL   Glucose by meter   Result Value Ref Range    Glucose 161 (H) 70 - 99 mg/dL           Attestation:  I have reviewed today's vital signs, notes, medications, labs and imaging.  Amount of time performed on this follow-up visit: 35 minutes, 20 of which were spent in care coordination and counseling.     Baldo Ashford MD

## 2018-11-26 NOTE — PROGRESS NOTES
Discharge Planner   Discharge Plans in progress: Home  Barriers to discharge plan: Medical Stability   Follow up plan: Home       Entered by: Carol Ann Gloria 11/26/2018 11:19 AM

## 2018-11-27 ENCOUNTER — ANTICOAGULATION THERAPY VISIT (OUTPATIENT)
Dept: ANTICOAGULATION | Facility: CLINIC | Age: 73
End: 2018-11-27
Payer: COMMERCIAL

## 2018-11-27 ENCOUNTER — TELEPHONE (OUTPATIENT)
Dept: FAMILY MEDICINE | Facility: CLINIC | Age: 73
End: 2018-11-27

## 2018-11-27 VITALS
DIASTOLIC BLOOD PRESSURE: 71 MMHG | SYSTOLIC BLOOD PRESSURE: 121 MMHG | OXYGEN SATURATION: 92 % | RESPIRATION RATE: 16 BRPM | WEIGHT: 194.45 LBS | HEART RATE: 102 BPM | BODY MASS INDEX: 27.84 KG/M2 | TEMPERATURE: 98.2 F | HEIGHT: 70 IN

## 2018-11-27 DIAGNOSIS — I48.20 CHRONIC ATRIAL FIBRILLATION (H): ICD-10-CM

## 2018-11-27 DIAGNOSIS — Z79.01 LONG TERM CURRENT USE OF ANTICOAGULANT THERAPY: ICD-10-CM

## 2018-11-27 LAB
CREAT SERPL-MCNC: 0.95 MG/DL (ref 0.66–1.25)
GFR SERPL CREATININE-BSD FRML MDRD: 78 ML/MIN/1.7M2
GLUCOSE BLDC GLUCOMTR-MCNC: 133 MG/DL (ref 70–99)
GLUCOSE BLDC GLUCOMTR-MCNC: 153 MG/DL (ref 70–99)
GLUCOSE BLDC GLUCOMTR-MCNC: 153 MG/DL (ref 70–99)
INR PPP: 1.89 (ref 0.86–1.14)
LACTATE BLD-SCNC: 0.8 MMOL/L (ref 0.7–2)

## 2018-11-27 PROCEDURE — 25000132 ZZH RX MED GY IP 250 OP 250 PS 637: Performed by: PHYSICIAN ASSISTANT

## 2018-11-27 PROCEDURE — 25000132 ZZH RX MED GY IP 250 OP 250 PS 637: Performed by: FAMILY MEDICINE

## 2018-11-27 PROCEDURE — 36415 COLL VENOUS BLD VENIPUNCTURE: CPT

## 2018-11-27 PROCEDURE — 85610 PROTHROMBIN TIME: CPT | Performed by: PHYSICIAN ASSISTANT

## 2018-11-27 PROCEDURE — 36415 COLL VENOUS BLD VENIPUNCTURE: CPT | Performed by: PHYSICIAN ASSISTANT

## 2018-11-27 PROCEDURE — 99207 ZZC NO CHARGE NURSE ONLY: CPT

## 2018-11-27 PROCEDURE — 25000128 H RX IP 250 OP 636: Performed by: FAMILY MEDICINE

## 2018-11-27 PROCEDURE — 00000146 ZZHCL STATISTIC GLUCOSE BY METER IP

## 2018-11-27 PROCEDURE — 25000132 ZZH RX MED GY IP 250 OP 250 PS 637

## 2018-11-27 PROCEDURE — 82565 ASSAY OF CREATININE: CPT | Performed by: PHYSICIAN ASSISTANT

## 2018-11-27 PROCEDURE — 83605 ASSAY OF LACTIC ACID: CPT

## 2018-11-27 PROCEDURE — 25000128 H RX IP 250 OP 636: Performed by: PHYSICIAN ASSISTANT

## 2018-11-27 PROCEDURE — 99239 HOSP IP/OBS DSCHRG MGMT >30: CPT

## 2018-11-27 RX ORDER — WARFARIN SODIUM 5 MG/1
5 TABLET ORAL
Status: DISCONTINUED | OUTPATIENT
Start: 2018-11-27 | End: 2018-11-27 | Stop reason: HOSPADM

## 2018-11-27 RX ADMIN — METFORMIN HYDROCHLORIDE 1000 MG: 500 TABLET, EXTENDED RELEASE ORAL at 07:48

## 2018-11-27 RX ADMIN — TAZOBACTAM SODIUM AND PIPERACILLIN SODIUM 4.5 G: 500; 4 INJECTION, SOLUTION INTRAVENOUS at 04:10

## 2018-11-27 RX ADMIN — GABAPENTIN 300 MG: 300 CAPSULE ORAL at 07:50

## 2018-11-27 RX ADMIN — MULTIPLE VITAMINS W/ MINERALS TAB 1 TABLET: TAB at 07:49

## 2018-11-27 RX ADMIN — TAMSULOSIN HYDROCHLORIDE 0.4 MG: 0.4 CAPSULE ORAL at 07:49

## 2018-11-27 RX ADMIN — METOPROLOL SUCCINATE 75 MG: 50 TABLET, EXTENDED RELEASE ORAL at 07:48

## 2018-11-27 RX ADMIN — FOLIC ACID 1 MG: 1 TABLET ORAL at 07:49

## 2018-11-27 RX ADMIN — Medication 100 MG: at 07:48

## 2018-11-27 RX ADMIN — LISINOPRIL 5 MG: 5 TABLET ORAL at 07:48

## 2018-11-27 RX ADMIN — TAZOBACTAM SODIUM AND PIPERACILLIN SODIUM 4.5 G: 500; 4 INJECTION, SOLUTION INTRAVENOUS at 10:25

## 2018-11-27 RX ADMIN — VANCOMYCIN HYDROCHLORIDE 1750 MG: 10 INJECTION, POWDER, LYOPHILIZED, FOR SOLUTION INTRAVENOUS at 00:04

## 2018-11-27 ASSESSMENT — ACTIVITIES OF DAILY LIVING (ADL)
ADLS_ACUITY_SCORE: 11

## 2018-11-27 NOTE — TELEPHONE ENCOUNTER
Pt will be discharged today, was admitted for sepsis needs to see  Provider for a hospital follow up. Please call pt to advise.  No available appointments.       Thank you,   Bernard FARRIS   Central Scheduling  [phone]

## 2018-11-27 NOTE — DISCHARGE SUMMARY
[facility]    Discharge Summary  Hospital Medicine    Date of Admission:  11/23/2018  Date of Discharge:  11/27/2018   Discharging Provider: Baldo Ashford  Date of Service: 11/27/2018      Primary Care     Katie Gross  [address] [place] [id number]      Identification and Chief Compaint: Antoine Russo is a 72 year old male who presented on 11/23/2018 with complaint of abdominal pain of 24 hours duration.    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)    Peripheral vascular disease (H)    SIRS (systemic inflammatory response syndrome) (H)    H/O colectomy    Type 2 diabetes mellitus with diabetic polyneuropathy, without long-term current use of insulin (H)    Spleen absent    Chronic atrial fibrillation (H)    Recurrent pancreatitis (H)    Long term current use of anticoagulant therapy    Pancreatitis, recurrent (H)      Discharge Disposition   Discharged to home    Discharge Orders     CBC with platelets   Last Lab Result: Hemoglobin (g/dL)      Date                     Value                11/26/2018               11.7 (L)         ----------     Reason for your hospital stay   You developed pancreatitis again, which caused pain, fever, and multiple lab alterations.  The inflammation in your pancreas has slowly resolved, you're pain-free and eating again, so you're ready for discharge home today.     Follow-up and recommended labs and tests    Follow up with primary care provider, Katie Gross, within 7 days for hospital follow- up.  The following labs/tests are recommended: CBC (future) ordered to be done about one week from discharge.      Activity   Your activity upon discharge: activity as tolerated.     Full Code     Diet   Follow this diet upon discharge: Orders Placed This Encounter     Moderate Consistent Carbohydrate Diet           Further instructions from your care team       Behavioral/Mental Health Resources  [place], [place], [facility], [place], [place], [place], [place], [place] and [facility]    **Patient should check with their health insurance to identify providers in network**    Crisis Lines:   Text 540661 from anywhere in the [place] to text with a trained Crisis Counselor   -[place]: [place] Crisis Connection: (632) 815-6062/1-187.409.3275   -[place]: [phone]    -[facility]/ [place]/ [place]/ [place]/ [place]: [phone]   -[facility]: [place] [employer] Crisis: [phone]   -[facility]: [facility] Crisis Team [phone] or  [phone]     Call the National Suicide Prevention Lifeline at 2-398-501-DRYE (8811) to be connected to a  counselor at a crisis center in your area if you, a family member, or friend are experiencing   thoughts of suicide. The call is FREE, confidential, and always available.       [facility] [place] on Mental Illness of [place] ([facility]) provides support groups and  educational programs. Visit www.namihelps.org or call the [facility] Helpline at 1-599.192.2773  Or [phone] for further information.       Crisis Mobile Serivces:   -[place]: [employer] [phone]   -[facility]/ [place]/ [place]/ [place]/ [place]: [phone]   -[facility]: [employer] [phone]   -[facility]: [phone] or [phone]    Chemical Dependency Detox:  - [facility]:  [phone] - can ask for other recommendations  - [facility]/[place]([facility]):  [phone]  - [place] [employer]: [phone] [facility]  - [facility] ([facility]):  [phone]  - [facility] : [phone] Duc REYNOSO  - [facility]):  [phone]  - [place] ([facility]):  [phone]    Chemical Dependency Assessment:   - [place] [facility]: [phone]   - Behavioral Health Providers, can help find a provider near you:  [phone]  - No insurance- call county of residence and ask about applying for a Rule 25    Counseling/psychotherapy:  - [facility] - [place],/[place]/ [facility]/[place] /other locations: [phone]  - Behavioral Healthcare Providers- Can help find a provider near you:  [phone]  - [facility]-[place]:  [phone]  - [facility]- [place]:  [phone]  - [facility]- [facility]/[place]:  [phone]  - [facility] Health [place]: [phone]  - [facility]- [place]/[facility]/other locations:  [phone]  - [facility] Based [facility]- [facility]/[place]:  [phone]  - [facility] - [facility]:  [phone]  - [facility] [place] - [place]:  [phone]  [facility]- [place]-based in [place]:  [phone]  - [phone]  - [facility]- [place]: [phone]  - [facility]- [facility]:  [phone]  - [facility]- [place] [phone]  - [facility] located in [place] (not affiliated with [place]): [phone]  - [facility]- [place]:  [phone]/[place]: [phone] and other locations.  - [facility]- [place]: [phone]  - [facility] Recovery- [place]:  [phone]  - [facility]- [facility]/[place]: [phone]  - Walk in counseling center (Free counseling services)- [facility]: [phone]     Psychiatry/ Mental Health Medication management:  - [facility]- [place],/[place]/[facility]/ [place]/  multiple locations: [phone]  - Behavioral Healthcare Providers- Can help find a provider near you, if you have preferred one or [facility] they can identify who is in network and assist with scheduling an appointment:  [phone]  - [facility]: [place]/multiple locations : [phone]  - [facility] - Dr Catrachito Vigil and other associates. Collaborative model  with PCP involvement:  [phone] (requires PCP referral specifically)  - [facility][1]- [place]:  [phone]  - [facility]: [place]:   [phone] (Requires individual to be engaged in counseling)  - [facility]- [place]: [phone] [facility]:  [phone]/[place]:  [phone]/ [place]:  [phone]  - Psychiatric Recovery- [place]:   [phone]  - [facility]- [place], [phone]  - [facility] Psychiatry - Medical students who rotate yearly:  [phone]    County Numbers  - [facility], [place], operated by [facility]: [phone]  - [facility]: [phone]  - [facility]: [phone]  - [facility] [place] [facility] : [phone]  - [facility]: [phone]  - [facility]: [phone]  - [facility]: [phone]  - [facility]: [phone]  - [facility]: [phone]  - [facility]: [phone]    **Please note that this list does not include all agencies that provide services**    Care Transitions Team at [facility] [phone]      Coumadin Discharge Instructions:  1. Your INR was 1.89 today. Take Coumadin 5 mg today (11/27), then resume normal dosing schedule of Coumadin 2.5 mg daily.  2. Recheck INR in 4 weeks on Thurs 12/27/18 at 9:45 am in [facility].        Discharge Medications   Current Discharge Medication List      CONTINUE these medications which have NOT CHANGED    Details   gabapentin (NEURONTIN) 300 MG capsule Take 1 capsule (300 mg) by mouth 3 times daily Take  one tablet (300mg) three times daily.  Qty: 90 capsule, Refills: 3    Associated Diagnoses: Peripheral polyneuropathy      gabapentin (NEURONTIN) 600 MG tablet Take 1 tablet (600 mg) by mouth At Bedtime Along with 300mg for a total of 900mg for bedtime dose  Qty: 30 tablet, Refills: 3    Associated Diagnoses: Peripheral polyneuropathy      lisinopril (PRINIVIL/ZESTRIL) 5 MG tablet TAKE 1 TABLET BY MOUTH ONCE DAILY  Qty: 90 tablet, Refills: 1    Associated Diagnoses: Hypertension, benign essential, goal below 140/90      metFORMIN (GLUCOPHAGE-XR) 500 MG 24 hr tablet TAKE TWO TABLETS BY MOUTH TWICE DAILY WITH MEALS  Qty: 360 tablet, Refills: 0    Associated Diagnoses: Type 2 diabetes mellitus with diabetic polyneuropathy, without long-term current use of insulin (H)      metoprolol succinate (TOPROL-XL) 50 MG 24 hr tablet Take 1.5 tablets (75 mg) by mouth 2 times daily  Qty: 270 tablet, Refills: 3    Associated Diagnoses: Hypertension, benign essential, goal below 140/90      tamsulosin (FLOMAX) 0.4 MG capsule Take 1 capsule (0.4 mg) by mouth daily  Qty: 90 capsule, Refills: 3    Associated Diagnoses: Urgency of urination; Hypertrophy of prostate without urinary obstruction      warfarin (COUMADIN) 2.5 MG tablet 2.5 mg daily or as directed by [facility]  Qty: 90 tablet, Refills: 0    Comments: Please consider 90 day supplies to promote better adherence  Associated Diagnoses: Chronic atrial fibrillation (H)      ASPIRIN NOT PRESCRIBED (INTENTIONAL) Antiplatelet medication not prescribed intentionally due to Current anticoagulant therapy (warfarin/enoxaparin)      multivitamin, therapeutic with minerals (THERA-VIT-M) TABS Take 1 tablet by mouth daily.  Qty: 30 each, Refills: 1    Associated Diagnoses: Surgery aftercare           Allergies   Allergies   Allergen Reactions     Nkda [No Known Drug Allergies]        Consultations This Hospital Stay   Consultation during this admission received from:    PHARMACY TO DOSE  WARFARIN  PHARMACY TO DOSE VANCO    Significant Results and Procedures   Procedures    None.    Data   Results for orders placed or performed during the hospital encounter of 11/23/18   CT Abd/Pelvis w Contrast*    Narrative    CT ABDOMEN AND PELVIS WITH CONTRAST   11/24/2018 11:55 AM     HISTORY: Acute pancreatitis, history of necrosis and pancreatectomy.     TECHNIQUE: CT abdomen and pelvis with Isovue 370, 95 mL IV. Radiation  dose for this scan was reduced using automated exposure control,  adjustment of the mA and/or kV according to patient size, or iterative  reconstruction technique.    COMPARISON: CT abdomen and pelvis 10/21/2018.    FINDINGS: Distal pancreatectomy and splenectomy changes. The mid  pancreatic body, head, and uncinate shows homogeneous enhancement  similar to the prior exam. Ongoing pancreatitis changes noted that  appear stable. There are regions of elevated density that may  represent complex fluid at the pancreatectomy location series 2 image  28. There is increasing ascites fluid at the pancreas and surrounding  the stomach at the left upper quadrant. There is some mild peritoneal  reflection enhancement. There is also likely reactive wall thickening  of the stomach that is only minimally distended. No new fluid  collection within the residual pancreas parenchyma.    The liver, gallbladder, adrenals, and kidneys show no acute  abnormalities. Multiple stable renal cysts. Vascular calcifications.  No bowel obstruction, or acute bowel abnormality is seen. Appendix not  identified. No secondary signs for appendicitis. Bibasilar atelectasis  noted.      Impression    IMPRESSION:  1. Pancreatitis changes with edema surrounding the pancreas and  increased volume of ascites fluid at the left upper quadrant  surrounding the stomach. Mild peritoneal reflection enhancement noted  that could represent peritonitis. The pancreatectomy changes appears  stable. Stable normal enhancement of the residual  "pancreas body, head,  and uncinate with no new areas of necrosis. Mildly complex density  fluid at the pancreatectomy site appears stable.  2. No other acute abnormality is seen.    YOLIE WARNER MD   Chest 2 Views*    Narrative    CHEST TWO VIEWS   11/25/2018 9:06 PM    HISTORY: New hypoxia and fever.    COMPARISON: 3/21/2018.      Impression    IMPRESSION: There has been development of a small left pleural  effusion with progression of moderate left basilar atelectasis or  infiltrate. The right lung is clear. No other change or abnormality is  seen.      JUNI SANTOS MD       History of Present Illness   (From H&P) This patient is a 72 year old  male with a significant past medical history of recurrent alcoholic pancreatitis as discussed above, paroxysmal Afib, CAD, type 2 diabetes, hypertension, head/neck cancer and colitis s/p partial colectomy who presents with abdominal pain.  Pain started yesterday AM, upper abdomen and up to a 9/10.  Worse with eating.  Feels \"pretty much the same\" as his prior bouts of pancreatitis.  Had been sober until July, then started drinking again and had an episode of pancreatitis last month, says he's only been drinking \"1 or 2 per day, sometimes none\" since last admission.  Pain improved with medications, currently 2-3/10 after pain medications.  No fever or chills.  No other new symptoms or concerns.  No black or bloody stools or diarrhea.  No changes in medications.    Non-smoker, alcohol as above, no illicit drug use.          Hospital Course   Antoine Russo was admitted on 11/23/2018.  The following problems were addressed during his hospitalization:    Systemic inflammatory response syndrome due to pancreatitis vs sepsis unknown source  Developed fever 11/25/18 PM,  T 101.8, , R 20 meeting SIRS criteria, lactate was normal at 1.1 at the time.  No obvious source of infection, procalcitonin only minimally elevated at 0.13.  WBC elevated since admission " "though have been attributing that to pancreatitis.  Empiric vanco and Zosyn started 11/25/18.  Blood cultures have remained negative.  Presume this fever was due to SIRS from pancreatitis.  He has had no additional fever.  Will discharge home today on no antibiotics, and will order follow-up CBC to confirm resolution of leukocytosis as outpatient.     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  He has history of necrotizing pancreatitis, pancreatic duct leak, and a history of a stent earlier in 2018, since removed.  He was abstinent from EtOH from Jan-July, then started using 3-4 beers/day starting in July.  He also stopped his pancreatic enzymes in June due to cost.  Had 2L NS in ER, then on NS at 200, switch to LR at 200 with plan to slow tomorrow.  On dilaudid and toradol prn for pain.    11/25/2018 -- no new necrosis on CT.  Patient markedly improved - tolerating clears - try fulls for dinner, plan for regular diet in AM if doing well.     11/26/2018 - Having \"no\" pain, no analgesic needs for > 36 hours.  Tolerating general diet without additional pain, nausea, emesis.  Can likely discharge home tomorrow if fever resolves, see above.  11/27/2018 - Remains pain-free, no analgesic needs since 11/25, eating regular diet and tolerating it well.  OK for discharge today.     Alcohol abuse/dependence  11/24/2018 -- started drinking again this past summer, with recent bout of pancreatitis due to this last month.  Says only 1-2 per day.  Discussed in detail today that he needs to remain completely abstinent from alcohol.  Patient expresses understanding and agreement.  Started on CIWA and oral vitamins.    11/25/2018 -- CIWAs 0 so far.    11/26/2018 - CIWA remains zero, no Rx has been required.  Will discontinue CIWA " monitoring, withdrawal resolved.          Hypertension, benign essential, goal below 140/90  11/24/2018 -- blood pressure intermittently elevated as it's been in the past - continue home lisinopril and metoprolol for now.     11/25/2018 -- doing good.  11/26/2018 - Good BP control on lisinopril and metoprolol.  11/27/2018 - BP control remains good; continue pre-admission Rx.       Hypertrophy of prostate without urinary obstruction  11/24/2018 -- continue home flomax.          CAD (coronary artery disease)/  Peripheral vascular disease (H)  11/24/2018 -- Non-Obstructive CAD seen on angiogram 2009, no stents or history of MI.  Not on statin for unclear reasons.  Not on ASA, on warfarin  11/25/2018 -- stable, asymptomatic.  11/27/2018 - No episodes of chest pain this hospital stay.  Not on ASA as he is on warfarin which will continue.  Continue metoprolol.        Hyperlipidemia LDL goal <100  11/24/2018 -- not on a statin, defer to PCP.     11/27/2018 - Suggest discussion re: statin therapy with PCP as outpatient.        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  Starting on high dose NPO sliding scale insulin.  A1c 7.6.    11/25/2018 -- good control - switching to 4 times daily high dose sliding scale insulin with increased oral intake.    11/26/2018 - Glucose control remains good on prandial and HS Novolog.  Now that his food intake is nearly back to normal, will resume metformin 1000 mg BID, monitor glucose response.  11/27/2018 - Good glucose control following resumption of pre-admission metformin.       Spleen absent  11/24/2018 --  WBC elevated but coming down, no evidence of symptoms of infection, appears stress response from acute pancreatitis.  Follow. No indication for antibiotics at present.    11/25/2018  -- WBC up today, but looks like this is mostly chronic - no signs of infection, clinically improving.  Follow.    11/26/18 - Stable, chronic leukocytosis noted.  No additional workup planned.  11/27/2018 - Remains clinically well, afebrile, blood cultures negative.  Leukocytosis is probably the result of acute pancreatitis, and resolution without additional Rx is anticipated.  Future CBC ordered to be done within the next week.       Chronic atrial fibrillation (H)  11/24/2018 -- INR now therapeutic, pharmacy to dose coumadin.   11/25/2018 --  Rate controlled.    11/26/2018 - Rate continues well-controlled with metoprolol, warfarin continues.    Pending Results   Unresulted Labs Ordered in the Past 30 Days of this Admission     Date and Time Order Name Status Description    11/25/2018 2104 Blood culture Preliminary     11/25/2018 2104 Blood culture Preliminary           Physical Exam   Temp:  [98.2  F (36.8  C)-100.1  F (37.8  C)] 98.2  F (36.8  C)  Pulse:  [] 102  Resp:  [16-20] 16  BP: (121-161)/(61-72) 121/71  SpO2:  [90 %-92 %] 92 %  Vitals:    11/24/18 0022   Weight: 88.2 kg (194 lb 7.1 oz)       GENERAL: Pleasant man seated on the edge of the bed, looks comfortable.  Wife present for visit.  EYES: Eyes grossly normal to inspection, extraocular movements intact  HENT: Nares congested bilaterally, clear discharge present.  NECK: Trachea midline, no stridor.    RESP: No accessory muscle use.  Symmetrical breath sounds.  Aside from a few inspiratory crackles at the left lateral base, lungs clear throughout on inspiration and expiration.  Expiration not prolonged, no wheeze.  CV: Regular rate and rhythm, occasional extrasystole, non-tachycardic.  Normal S1 S2, no murmur or extra sound.  Trace bilateral lower extremity edema.  ABDOMEN: Soft, non-tender, no guarding.  Liver and spleen not palpable, no masses palpable.  Bowel sounds positive.  MS: No bony deformities noted.  No red or inflamed joints.  SKIN:  Warm and dry, no rashes.  NEURO: Alert, oriented, conversant.  Cranial nerves II - XII grossly intact.  No gross motor or sensory deficits.    PSYCH: Calm, alert, conversant.  Able to articulate logical thoughts, no tangential thoughts, no hallucinations or delusions.  Affect normal.    The discharge plan was discussed with the patient and his wife, both of whom express agreement.    Total time on this discharge was 35 minutes.    Baldo Ashford MD

## 2018-11-27 NOTE — TELEPHONE ENCOUNTER
left message for patient to return call.  I have an appointment for him on 12-6-18 at 1120  Lupis Hurt RN

## 2018-11-27 NOTE — PHARMACY - DISCHARGE MEDICATION RECONCILIATION
Discharge medication review for this patient is complete. Pharmacist assisted with medication reconciliation of discharge medications with prior to admission medications.     The following changes were made to the discharge medication list based on pharmacist review:  Added:  none  Discontinued: none  Changed: none      Patient's Discharge Medication List  - medications as listed on After Visit Summary (AVS)     Review of your medicines      CONTINUE these medicines which have NOT CHANGED       Dose / Directions    ASPIRIN NOT PRESCRIBED   Commonly known as:  INTENTIONAL        Antiplatelet medication not prescribed intentionally due to Current anticoagulant therapy (warfarin/enoxaparin)   Refills:  0       * gabapentin 600 MG tablet   Commonly known as:  NEURONTIN   Used for:  Peripheral polyneuropathy        Dose:  600 mg   Take 1 tablet (600 mg) by mouth At Bedtime Along with 300mg for a total of 900mg for bedtime dose   Quantity:  30 tablet   Refills:  3       * gabapentin 300 MG capsule   Commonly known as:  NEURONTIN   Used for:  Peripheral polyneuropathy        Dose:  300 mg   Take 1 capsule (300 mg) by mouth 3 times daily Take one tablet (300mg) three times daily.   Quantity:  90 capsule   Refills:  3       lisinopril 5 MG tablet   Commonly known as:  PRINIVIL/ZESTRIL   Used for:  Hypertension, benign essential, goal below 140/90        TAKE 1 TABLET BY MOUTH ONCE DAILY   Quantity:  90 tablet   Refills:  1       metFORMIN 500 MG 24 hr tablet   Commonly known as:  GLUCOPHAGE-XR   Used for:  Type 2 diabetes mellitus with diabetic polyneuropathy, without long-term current use of insulin (H)        TAKE TWO TABLETS BY MOUTH TWICE DAILY WITH MEALS   Quantity:  360 tablet   Refills:  0       metoprolol succinate 50 MG 24 hr tablet   Commonly known as:  TOPROL-XL   Used for:  Hypertension, benign essential, goal below 140/90        Dose:  75 mg   Take 1.5 tablets (75 mg) by mouth 2 times daily   Quantity:  270  tablet   Refills:  3       multivitamin w/minerals tablet   Used for:  Surgery aftercare        Dose:  1 tablet   Take 1 tablet by mouth daily.   Quantity:  30 each   Refills:  1       tamsulosin 0.4 MG capsule   Commonly known as:  FLOMAX   Used for:  Urgency of urination, Hypertrophy of prostate without urinary obstruction        Dose:  0.4 mg   Take 1 capsule (0.4 mg) by mouth daily   Quantity:  90 capsule   Refills:  3       warfarin 2.5 MG tablet   Commonly known as:  COUMADIN   Used for:  Chronic atrial fibrillation (H)        2.5 mg daily or as directed by [facility]   Quantity:  90 tablet   Refills:  0       * Notice:  This list has 2 medication(s) that are the same as other medications prescribed for you. Read the directions carefully, and ask your doctor or other care provider to review them with you.        Mor BettencourtD

## 2018-11-27 NOTE — PLAN OF CARE
Problem: Pancreatitis, Acute/Chronic (Adult)  Goal: Signs and Symptoms of Listed Potential Problems Will be Absent, Minimized or Managed (Pancreatitis, Acute/Chronic)  Signs and symptoms of listed potential problems will be absent, minimized or managed by discharge/transition of care (reference Pancreatitis, Acute/Chronic (Adult) CPG).   Outcome: Improving  Alert and oriented, up independently in room. Denies any pain or nausea. Continues to have loose stools, which he states is baseline for him. Vital signs stable. Oxygen saturation in low 90's on room air.  IV antibiotics administered and then IV saline locked.

## 2018-11-27 NOTE — MR AVS SNAPSHOT
Antoine Russo   11/27/2018   Anticoagulation Therapy Visit    Description:  72 year old male   Provider:  Angela Harrell, RN   Department:  [facility]           INR as of 11/27/2018     Today's INR No new INR was available at the time of this encounter.      Anticoagulation Summary as of 11/27/2018     INR goal 2.0-3.0   Today's INR No new INR was available at the time of this encounter.   Full warfarin instructions 11/27: 5 mg; Otherwise 2.5 mg every day   Next INR check 12/27/2018    Indications   Chronic atrial fibrillation (H) [I48.2]  Long term current use of anticoagulant therapy [Z79.01]         Your next Anticoagulation Clinic appointment(s)     Dec 27, 2018  9:45 AM CST   Anticoagulation Visit with NB ANTI COAG   [facility]    [address] [place] [id number]   [phone]              November 2018 Details    Sun Mon Tue Wed Thu Fri Sat         1               2               3                 4               5               6               7               8               9               10                 11               12               13               14               15               16               17                 18               19               20               21               22               23               24                 25               26               27      5 mg   See details      28      2.5 mg         29      2.5 mg         30      2.5 mg           Date Details   11/27 This INR check               How to take your warfarin dose     To take:  2.5 mg Take 1 of the 2.5 mg tablets.    To take:  5 mg Take 1 of the 5 mg tablets.           December 2018 Details    Sun Mon Tue Wed Thu Fri Sat           1      2.5 mg           2      2.5 mg         3      2.5 mg         4      2.5 mg         5      2.5 mg         6      2.5 mg         7      2.5 mg         8      2.5 mg           9      2.5 mg         10       2.5 mg         11      2.5 mg         12      2.5 mg         13      2.5 mg         14      2.5 mg         15      2.5 mg           16      2.5 mg         17      2.5 mg         18      2.5 mg         19      2.5 mg         20      2.5 mg         21      2.5 mg         22      2.5 mg           23      2.5 mg         24      2.5 mg         25      2.5 mg         26      2.5 mg         27            28               29                 30               31                     Date Details   No additional details    Date of next INR:  12/27/2018         How to take your warfarin dose     To take:  2.5 mg Take 1 of the 2.5 mg tablets.

## 2018-11-27 NOTE — PHARMACY-ANTICOAGULATION SERVICE
Clinical Pharmacy- Warfarin Discharge Note  This patient is currently on warfarin for the treatment of Atrial fibrillation.  INR Goal= 2-3  Expected length of therapy lifetime.    Warfarin PTA Regimen: 2.5mg daily      Anticoagulation Dose History     Recent Dosing and Labs Latest Ref Rng & Units 11/23/2018 11/24/2018 11/24/2018 11/24/2018 11/25/2018 11/26/2018 11/27/2018    Warfarin 2.5 mg - - 2.5 mg - 2.5 mg 2.5 mg - -    Warfarin 3 mg - - - - - - 3 mg -    INR 0.86 - 1.14 1.94(H) - 2.17(H) - 2.09(H) 2.02(H) 1.89(H)    INR 0.86 - 1.14 - - - - - - -          Vitamin K doses administered during the last 7 days: none  FFP administered during the last 7 days: none  Recommend discharging the patient on a warfarin regimen of 5 mg today (11/27), then resume normal dosing schedule of 2.5 mg daily.     The patient should have an INR checked Thur 12/27 @ 9:45 am in [facility].    Janel Robles PharmD

## 2018-11-27 NOTE — DISCHARGE INSTRUCTIONS
Behavioral/Mental Health Resources  [place], [place], [facility], [place], [place], [place], [place], [place] and [facility]    **Patient should check with their health insurance to identify providers in network**    Crisis Lines:   Text 045506 from anywhere in the [place] to text with a trained Crisis Counselor   -[place]: [place] Crisis Connection: (136) 952-3786/1-746.491.8919   -[place]: (158) 900-2470    -[facility]/ [place]/ [place]/ [place]/ [place]: [phone]   -[facility]: [employer] [employer] Crisis: [phone]   -[facility]: [facility] Crisis Team (523) [phone] or  [phone]     Call the [employer] Suicide Prevention Lifeline at 5-646-831-YPWP (0294) to be connected to a  counselor at a crisis center in your area if you, a family member, or friend are experiencing   thoughts of suicide. The call is FREE, confidential, and always available.       National [place] on Mental Illness of [place] ([facility]) provides support groups and  educational programs. Visit www.namihelps.org or call the [facility] Helpline at 1-170.689.4542  Or [phone] for further information.       Crisis Mobile Serivces:   -[place]: [employer] (620) 816-6181   -[facility]/ [place]/ [place]/ [place]/ [place]: [phone]   -[facility]: [employer] [phone]   -[facility]: ([phone] or [phone]    Chemical Dependency Detox:  - [place] [facility]:  [phone] - can ask for other recommendations  - [facility]/[place]([facility]):  [phone]  - [facility]: [phone] [facility]  - [facility] ([facility]):  [phone]  - [facility] : [phone] [facility]  - [facility]):  [phone]  - [place] ([facility]):  [phone]    Chemical Dependency Assessment:   - [facility]: [phone]   - Behavioral Health Providers, can help find a provider near you:  [phone]  - No insurance- call [facility] of  residence and ask about applying for a Rule 25    Counseling/psychotherapy:  - Associated clinic of psychology - [place],/[place]/ [facility]/[place] /other locations: [phone]  - Behavioral Healthcare Providers- Can help find a provider near you:  [phone]  - [facility] Services-[place]:  [phone]  - [facility]- [place]:  [phone]  - [facility]- [facility]/[place]:  [phone]  - [facility]-[place]: [phone]  - [facility]- [place]/[facility]/other locations:  [phone]  - [facility]- [facility]/[place]:  [phone]  - [facility] - [facility]:  [phone]  - [facility] - [place]:  [phone]  - [facility]- [place]-based in [place]:  [phone]  - [phone]  - [facility]- [place]: [phone]  - [facility] Services- [facility]:  [phone]  - [facility] Counseling- [place] [phone]  - [facility] located in [place] (not affiliated with [place]): [phone]  - [facility]- [place]:  [phone]/[place]: [phone] and other locations.  - [facility]- [place]: [phone]  - [facility] Recovery- [place]:  [phone]  - [facility]- [facility]/[place]: [phone]  - Walk in [facility] center (Free counseling services)- [facility]: [phone]     Psychiatry/ Mental Health Medication management:  - [facility] clinic of psychology- [place],/[place]/[facility]/ [place]/ multiple locations: [phone]  - Behavioral Healthcare Providers- Can help find a provider near you, if you have preferred one or [employer] they can identify who is in network and assist with scheduling an appointment:  [phone]  - [facility]:  [place]/[facility] locations : [phone]  - [facility] - Dr Catrachito Vigil and other associates. Collaborative model  with PCP involvement:  [phone] (requires PCP referral specifically)  - [facility]- [place]:  [phone]  - [facility] Human Services: [place]:   [phone] (Requires individual to be engaged in counseling)  - [facility]- [place]: [phone] [place]/[facility]:  [phone]/[place]:  [phone]/ [place]:  [phone]  - Psychiatric Recovery- [place]:   [phone]  - [facility]- [place], [phone]  - [facility] Psychiatry - Medical students who rotate yearly:  [phone]    County Numbers  - [facility]: [phone]  - [facility]: [phone]  - [place]: [phone]  - [facility] [place] : [phone]  - [place]: [phone]  - [facility]: [phone]  - [place]: [phone]  - [place]: [phone]  - [place]: [phone]  - [facility]: [phone]    **Please note that this list does not include all agencies that provide services**    Care Transitions Team at [facility] [phone]      Coumadin Discharge Instructions:  1. Your INR was 1.89 today. Take Coumadin 5 mg today (11/27), then resume normal dosing schedule of Coumadin 2.5 mg daily.  2. Recheck INR in 4 weeks on Thurs 12/27/18 at 9:45 am in [facility].

## 2018-11-27 NOTE — PROGRESS NOTES
ANTICOAGULATION FOLLOW-UP CLINIC VISIT    Patient Name:  Antoine Russo  Date:  11/27/2018  Contact Type:  Chart Review    SUBJECTIVE:     Patient Findings     Comments Discharged with instructions to take 5 mg today, then 2.5 mg daily. Recheck INR in 4 weeks. Made appt for pt on 12/27 at 9:45 am. Cancelled this Thursday's appt.     Janel HURD     Date of Admission:  11/23/2018  Date of Discharge:  11/27/2018     Coumadin Discharge Instructions:  1. Your INR was 1.89 today. Take Coumadin 5 mg today (11/27), then resume normal dosing schedule of Coumadin 2.5 mg daily.  2. Recheck INR in 4 weeks on Thurs 12/27/18 at 9:45 am in [facility].            OBJECTIVE    INR   Date Value Ref Range Status   11/27/2018 1.89 (H) 0.86 - 1.14 Final       ASSESSMENT / PLAN  No question data found.  Anticoagulation Summary as of 11/27/2018     INR goal 2.0-3.0   Today's INR No new INR was available at the time of this encounter.   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

## 2018-11-28 ENCOUNTER — TRANSFERRED RECORDS (OUTPATIENT)
Dept: HEALTH INFORMATION MANAGEMENT | Facility: CLINIC | Age: 73
End: 2018-11-28

## 2018-12-03 ENCOUNTER — TELEPHONE (OUTPATIENT)
Dept: NEUROLOGY | Facility: CLINIC | Age: 73
End: 2018-12-03

## 2018-12-03 NOTE — TELEPHONE ENCOUNTER
Please let Mr. Russo know that the nerve conduction studies/EMG did show peripheral neuropathy. These findings may be due to the diabetes and/or the prior heavy alcohol use. There are additional causes for neuropathy. We can do blood work for some of these or discuss further when he follows-up with me next month. Please let me know if he prefers to do the labs prior to that appointment.     Thank you,  Anais Justice

## 2018-12-06 ENCOUNTER — OFFICE VISIT (OUTPATIENT)
Dept: FAMILY MEDICINE | Facility: CLINIC | Age: 73
End: 2018-12-06
Payer: COMMERCIAL

## 2018-12-06 VITALS
BODY MASS INDEX: 27.12 KG/M2 | HEART RATE: 69 BPM | DIASTOLIC BLOOD PRESSURE: 88 MMHG | TEMPERATURE: 97.4 F | RESPIRATION RATE: 16 BRPM | SYSTOLIC BLOOD PRESSURE: 138 MMHG | OXYGEN SATURATION: 96 % | WEIGHT: 189 LBS

## 2018-12-06 DIAGNOSIS — I48.20 CHRONIC ATRIAL FIBRILLATION (H): Chronic | ICD-10-CM

## 2018-12-06 DIAGNOSIS — E11.42 TYPE 2 DIABETES MELLITUS WITH DIABETIC POLYNEUROPATHY, WITHOUT LONG-TERM CURRENT USE OF INSULIN (H): Chronic | ICD-10-CM

## 2018-12-06 DIAGNOSIS — Z79.01 LONG TERM CURRENT USE OF ANTICOAGULANT THERAPY: ICD-10-CM

## 2018-12-06 DIAGNOSIS — L82.0 INFLAMED SEBORRHEIC KERATOSIS: ICD-10-CM

## 2018-12-06 DIAGNOSIS — R79.89 ELEVATED PLATELET COUNT: ICD-10-CM

## 2018-12-06 DIAGNOSIS — D72.829 LEUKOCYTOSIS, UNSPECIFIED TYPE: ICD-10-CM

## 2018-12-06 DIAGNOSIS — I10 HYPERTENSION, BENIGN ESSENTIAL, GOAL BELOW 140/90: Chronic | ICD-10-CM

## 2018-12-06 LAB
ERYTHROCYTE [DISTWIDTH] IN BLOOD BY AUTOMATED COUNT: 15 % (ref 10–15)
HCT VFR BLD AUTO: 41.5 % (ref 40–53)
HGB BLD-MCNC: 12.7 G/DL (ref 13.3–17.7)
MCH RBC QN AUTO: 31.3 PG (ref 26.5–33)
MCHC RBC AUTO-ENTMCNC: 30.6 G/DL (ref 31.5–36.5)
MCV RBC AUTO: 102 FL (ref 78–100)
PLATELET # BLD AUTO: 711 10E9/L (ref 150–450)
RBC # BLD AUTO: 4.06 10E12/L (ref 4.4–5.9)
WBC # BLD AUTO: 10.5 10E9/L (ref 4–11)

## 2018-12-06 PROCEDURE — 99214 OFFICE O/P EST MOD 30 MIN: CPT | Performed by: FAMILY MEDICINE

## 2018-12-06 PROCEDURE — 85027 COMPLETE CBC AUTOMATED: CPT | Performed by: FAMILY MEDICINE

## 2018-12-06 PROCEDURE — 36415 COLL VENOUS BLD VENIPUNCTURE: CPT | Performed by: FAMILY MEDICINE

## 2018-12-06 NOTE — MR AVS SNAPSHOT
After Visit Summary   12/6/2018    Antoine Russo    MRN: [id number]           Patient Information     Date Of Birth          1945        Visit Information        Provider Department      12/6/2018 11:20 AM Katie Gross MD [facility]        Today's Diagnoses     Recurrent pancreatitis (H)    -  1    Chronic atrial fibrillation (H)        Hypertension, benign essential, goal below 140/90        Long term current use of anticoagulant therapy        Type 2 diabetes mellitus with diabetic polyneuropathy, without long-term current use of insulin (H)        Leukocytosis, unspecified type          Care Instructions    Ok to go to metformin one twice a day for now    See you in 3 months          Follow-ups after your visit        Follow-up notes from your care team     Return in about 3 months (around 3/6/2019).      Your next 10 appointments already scheduled     Dec 27, 2018  9:45 AM CST   Anticoagulation Visit with NB ANTI COAG   [facility]    [address] [place] [id number]   [phone]            Jan 17, 2019  8:00 AM CST   Return Visit with Anais Justice MD   [facility]    [address] [place] [id number]   [phone]              Who to contact     If you have questions or need follow up information about today's clinic visit or your schedule please contact [facility] directly at [phone].  Normal or non-critical lab and imaging results will be communicated to you by MyChart, letter or phone within 4 business days after the clinic has received the results. If you do not hear from us within 7 days, please contact the clinic through MyChart or phone. If you have a critical or abnormal lab result, we will notify you by phone as soon as possible.  Submit refill requests through [employer] or call your pharmacy and  they will forward the refill request to us. Please allow 3 business days for your refill to be completed.          Additional Information About Your Visit        [email or web address] Information     [employer] gives you secure access to your electronic health record. If you see a primary care provider, you can also send messages to your care team and make appointments. If you have questions, please call your primary care clinic.  If you do not have a primary care provider, please call [phone] and they will assist you.        Care EveryWhere ID     This is your Care EveryWhere ID. This could be used by other organizations to access your [place] medical records  [id number]        Your Vitals Were     Pulse Temperature Respirations Pulse Oximetry BMI (Body Mass Index)       69 97.4  F (36.3  C) (Tympanic) 16 96% 27.12 kg/m2        Blood Pressure from Last 3 Encounters:   12/06/18 138/88   11/27/18 121/71   11/12/18 131/57    Weight from Last 3 Encounters:   12/06/18 189 lb (85.7 kg)   11/24/18 194 lb 7.1 oz (88.2 kg)   11/12/18 191 lb 12.8 oz (87 kg)              We Performed the Following     CBC with platelets        Primary Care Provider Office Phone # Fax #    Katie Gross [phone]       [address] [place] [address]        Equal Access to Services     [facility] ISABELLE : Hadii aad ku hadasho Soomaali, waaxda luqadaha, qaybta kaalmada adeegyada, waxgaye lowe hayjacinto davidson . So [facility] [phone].    ATENCIÓN: Si habla español, tiene a snyder disposición servicios gratuitos de asistencia lingüística. Llnaida al [phone].    We comply with applicable federal civil rights laws and [place] laws. We do not discriminate on the basis of race, color, national origin, age, disability, sex, sexual orientation, or gender identity.            Thank you!     Thank you for choosing [facility]  for your care. Our goal is always to provide you with excellent care. Hearing back from  our patients is one way we can continue to improve our services. Please take a few minutes to complete the written survey that you may receive in the mail after your visit with us. Thank you!             Your Updated Medication List - Protect others around you: Learn how to safely use, store and throw away your medicines at www.disposemymeds.org.          This list is accurate as of 12/6/18 12:26 PM.  Always use your most recent med list.                   Brand Name Dispense Instructions for use Diagnosis    ASPIRIN NOT PRESCRIBED    INTENTIONAL     Antiplatelet medication not prescribed intentionally due to Current anticoagulant therapy (warfarin/enoxaparin)        * gabapentin 600 MG tablet    NEURONTIN    30 tablet    Take 1 tablet (600 mg) by mouth At Bedtime Along with 300mg for a total of 900mg for bedtime dose    Peripheral polyneuropathy       * gabapentin 300 MG capsule    NEURONTIN    90 capsule    Take 1 capsule (300 mg) by mouth 3 times daily Take one tablet (300mg) three times daily.    Peripheral polyneuropathy       lisinopril 5 MG tablet    PRINIVIL/ZESTRIL    90 tablet    TAKE 1 TABLET BY MOUTH ONCE DAILY    Hypertension, benign essential, goal below 140/90       metFORMIN 500 MG 24 hr tablet    GLUCOPHAGE-XR    360 tablet    TAKE TWO TABLETS BY MOUTH TWICE DAILY WITH MEALS    Type 2 diabetes mellitus with diabetic polyneuropathy, without long-term current use of insulin (H)       metoprolol succinate ER 50 MG 24 hr tablet    TOPROL-XL    270 tablet    Take 1.5 tablets (75 mg) by mouth 2 times daily    Hypertension, benign essential, goal below 140/90       multivitamin w/minerals tablet     30 each    Take 1 tablet by mouth daily.    Surgery aftercare       tamsulosin 0.4 MG capsule    FLOMAX    90 capsule    Take 1 capsule (0.4 mg) by mouth daily    Urgency of urination, Hypertrophy of prostate without urinary obstruction       warfarin 2.5 MG tablet    COUMADIN    90 tablet    2.5 mg daily or as  directed by [facility]    Chronic atrial fibrillation (H)       * Notice:  This list has 2 medication(s) that are the same as other medications prescribed for you. Read the directions carefully, and ask your doctor or other care provider to review them with you.

## 2018-12-06 NOTE — NURSING NOTE
"Chief Complaint   Patient presents with     Hospital F/U     pancreatitis       Initial /88  Pulse 69  Temp 97.4  F (36.3  C) (Tympanic)  Resp 16  Wt 189 lb (85.7 kg)  SpO2 96%  BMI 27.12 kg/m2 Estimated body mass index is 27.12 kg/(m^2) as calculated from the following:    Height as of 11/24/18: 5' 10\" (1.778 m).    Weight as of this encounter: 189 lb (85.7 kg).    Patient presents to the clinic using No DME    Health Maintenance that is potentially due pending provider review:  NONE    n/a    Is there anyone who you would like to be able to receive your results? No  If yes have patient fill out VIDYA    "

## 2018-12-06 NOTE — PROGRESS NOTES
SUBJECTIVE:   Antoine Russo is a 72 year old male who presents to clinic today for the following health issues:          Hospital Follow-up Visit:    Hospital/Nursing Home/IP Rehab Facility: [facility]  Date of Admission: 11/23/2018  Date of Discharge: 11/27/2018  Reason(s) for Admission: Pancreatitis            Problems taking medications regularly:  None       Medication changes since discharge: None       Problems adhering to non-medication therapy:  None    Summary of hospitalization:  [facility] discharge summary reviewed  Diagnostic Tests/Treatments reviewed.  Follow up needed: none  Other Healthcare Providers Involved in Patient s Care:         None  Update since discharge: improved.     Post Discharge Medication Reconciliation: discharge medications reconciled, continue medications without change.  Plan of care communicated with patient     Coding guidelines for this visit:  Type of Medical   Decision Making Face-to-Face Visit       within 7 Days of discharge Face-to-Face Visit        within 14 days of discharge   Moderate Complexity 45061 [id number]   High Complexity 06889 [id number]          Had another bout of pancreatitis. Says he tried to drink a beer or two, that seemed to bring it on.     Diabetes  Lab Results   Component Value Date    A1C 7.6 11/23/2018    A1C 6.9 10/22/2018    A1C 7.1 08/23/2018    A1C 7.7 05/22/2018    A1C 7.7 03/16/2018      he found out that metformin causes diarrhea, tried less and it did help and he had formed stool,s asks if he can cut down .    Skin lesion on face is changing, right face    afib-on metoprolol for rate control, and warfarin    Problem list and histories reviewed & adjusted, as indicated.  Additional history: as documented    BP Readings from Last 3 Encounters:   12/06/18 138/88   11/27/18 121/71   11/12/18 131/57    Wt Readings from Last 3 Encounters:   12/06/18 189 lb (85.7 kg)   11/24/18 194 lb 7.1 oz (88.2 kg)   11/12/18 191 lb 12.8 oz (87  kg)                    Reviewed and updated as needed this visit by clinical staff  Tobacco  Allergies  Meds  Problems  Med Hx  Surg Hx  Fam Hx  Soc Hx        Reviewed and updated as needed this visit by Provider  Allergies  Meds  Problems         ROS:  CONSTITUTIONAL: NEGATIVE for fever, chills, change in weight  ENT/MOUTH: NEGATIVE for ear, mouth and throat problems  RESP: NEGATIVE for significant cough or SOB  CV: NEGATIVE for chest pain, palpitations or peripheral edema  GI: NEGATIVE for nausea, abdominal pain, heartburn, or change in bowel habits  : negative for dysuria, hematuria, decreased urinary stream, erectile dysfunction    OBJECTIVE:                                                    /88  Pulse 69  Temp 97.4  F (36.3  C) (Tympanic)  Resp 16  Wt 189 lb (85.7 kg)  SpO2 96%  BMI 27.12 kg/m2  Body mass index is 27.12 kg/(m^2).  GENERAL: healthy, alert, well nourished, well hydrated, no distress  NECK: no tenderness, no adenopathy, no asymmetry, no masses, no stiffness; thyroid- normal to palpation  RESP: lungs clear to auscultation - no rales, no rhonchi, no wheezes  CV: regular rates and rhythm, normal S1 S2, no S3 or S4 and no murmur, no click or rub -  ABDOMEN: soft, no tenderness, no  hepatosplenomegaly, no masses, normal bowel sounds  MS: extremities- no gross deformities noted, no edema  Skin: one cm lesion right central cheek, scaly hyperpigmented pasted on appearing lesion  Has some erythema on one side     ASSESSMENT/PLAN:                                                      ASSESSMENT:  1. Recurrent pancreatitis (H)    2. Chronic atrial fibrillation (H)    3. Hypertension, benign essential, goal below 140/90    4. Long term current use of anticoagulant therapy    5. Type 2 diabetes mellitus with diabetic polyneuropathy, without long-term current use of insulin (H)    6. Leukocytosis, unspecified type    7. Inflamed seborrheic keratosis        PLAN:  Orders Placed This  Encounter     CBC with platelets     Ok to decrease his metformin  See below  Reassured that looks benign, will monitor    Patient Instructions   Ok to go to metformin one twice a day for now    See you in 3 months     Katie Gross MD  [facility]

## 2018-12-27 ENCOUNTER — ANTICOAGULATION THERAPY VISIT (OUTPATIENT)
Dept: ANTICOAGULATION | Facility: CLINIC | Age: 73
End: 2018-12-27
Payer: COMMERCIAL

## 2018-12-27 DIAGNOSIS — Z79.01 LONG TERM CURRENT USE OF ANTICOAGULANT THERAPY: ICD-10-CM

## 2018-12-27 DIAGNOSIS — I48.20 CHRONIC ATRIAL FIBRILLATION (H): ICD-10-CM

## 2018-12-27 LAB — INR POINT OF CARE: 2.4 (ref 0.86–1.14)

## 2018-12-27 PROCEDURE — 36416 COLLJ CAPILLARY BLOOD SPEC: CPT

## 2018-12-27 PROCEDURE — 99207 ZZC NO CHARGE NURSE ONLY: CPT

## 2018-12-27 PROCEDURE — 85610 PROTHROMBIN TIME: CPT | Mod: QW

## 2018-12-27 NOTE — PROGRESS NOTES
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 He is avoiding alcohol at this time. INR in range and no other changes or concerns.            OBJECTIVE    INR Protime   Date Value Ref Range Status   2018 2.4 (A) 0.86 - 1.14 Final       ASSESSMENT / PLAN  INR assessment THER    Recheck INR In: 6 WEEKS    INR Location Clinic      Anticoagulation Summary  As of 2018    INR goal:   2.0-3.0   TTR:   59.2 % (7.7 mo)   INR used for dosin.4 (2018)   Warfarin maintenance plan:   2.5 mg (2.5 mg x 1) every day   Full warfarin instructions:   2.5 mg every day   Weekly warfarin total:   17.5 mg   No change documented:   Aparna Kunz RN   Plan last modified:   Angela Harrell RN (2018)   Next INR check:   2019   Priority:   INR   Target end date:   Indefinite    Indications    Chronic atrial fibrillation (H) [I48.2]  Long term current use of anticoagulant therapy [Z79.01]             Anticoagulation Episode Summary     INR check location:       Preferred lab:       Send INR reminders to:   NB ANTICO CLINIC POOL    Comments:   * 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

## 2019-01-17 ENCOUNTER — OFFICE VISIT (OUTPATIENT)
Dept: NEUROLOGY | Facility: CLINIC | Age: 74
End: 2019-01-17
Payer: COMMERCIAL

## 2019-01-17 VITALS
BODY MASS INDEX: 27.06 KG/M2 | TEMPERATURE: 97.4 F | SYSTOLIC BLOOD PRESSURE: 122 MMHG | HEART RATE: 75 BPM | HEIGHT: 70 IN | WEIGHT: 189 LBS | RESPIRATION RATE: 12 BRPM | DIASTOLIC BLOOD PRESSURE: 76 MMHG | OXYGEN SATURATION: 96 %

## 2019-01-17 DIAGNOSIS — E07.9 THYROID DYSFUNCTION: ICD-10-CM

## 2019-01-17 DIAGNOSIS — G62.9 PERIPHERAL POLYNEUROPATHY: Primary | ICD-10-CM

## 2019-01-17 LAB
ALBUMIN SERPL-MCNC: 3.5 G/DL (ref 3.4–5)
ALP SERPL-CCNC: 54 U/L (ref 40–150)
ALT SERPL W P-5'-P-CCNC: 31 U/L (ref 0–70)
ANION GAP SERPL CALCULATED.3IONS-SCNC: 5 MMOL/L (ref 3–14)
AST SERPL W P-5'-P-CCNC: 21 U/L (ref 0–45)
BILIRUB SERPL-MCNC: 0.4 MG/DL (ref 0.2–1.3)
BUN SERPL-MCNC: 19 MG/DL (ref 7–30)
CALCIUM SERPL-MCNC: 9.4 MG/DL (ref 8.5–10.1)
CHLORIDE SERPL-SCNC: 98 MMOL/L (ref 94–109)
CO2 SERPL-SCNC: 29 MMOL/L (ref 20–32)
CREAT SERPL-MCNC: 0.95 MG/DL (ref 0.66–1.25)
CRP SERPL-MCNC: 12 MG/L (ref 0–8)
ENA SS-A IGG SER IA-ACNC: <0.2 AI (ref 0–0.9)
ENA SS-B IGG SER IA-ACNC: <0.2 AI (ref 0–0.9)
ERYTHROCYTE [DISTWIDTH] IN BLOOD BY AUTOMATED COUNT: 15.5 % (ref 10–15)
ERYTHROCYTE [SEDIMENTATION RATE] IN BLOOD BY WESTERGREN METHOD: 5 MM/H (ref 0–20)
FOLATE SERPL-MCNC: 32.3 NG/ML
GFR SERPL CREATININE-BSD FRML MDRD: 80 ML/MIN/{1.73_M2}
GLUCOSE SERPL-MCNC: 370 MG/DL (ref 70–99)
HCT VFR BLD AUTO: 43.3 % (ref 40–53)
HGB BLD-MCNC: 13.9 G/DL (ref 13.3–17.7)
MCH RBC QN AUTO: 31.6 PG (ref 26.5–33)
MCHC RBC AUTO-ENTMCNC: 32.1 G/DL (ref 31.5–36.5)
MCV RBC AUTO: 98 FL (ref 78–100)
PLATELET # BLD AUTO: 272 10E9/L (ref 150–450)
POTASSIUM SERPL-SCNC: 4.5 MMOL/L (ref 3.4–5.3)
PROT SERPL-MCNC: 7.5 G/DL (ref 6.8–8.8)
RBC # BLD AUTO: 4.4 10E12/L (ref 4.4–5.9)
SODIUM SERPL-SCNC: 132 MMOL/L (ref 133–144)
TSH SERPL DL<=0.005 MIU/L-ACNC: 1.58 MU/L (ref 0.4–4)
VIT B12 SERPL-MCNC: 383 PG/ML (ref 193–986)
WBC # BLD AUTO: 10.3 10E9/L (ref 4–11)

## 2019-01-17 PROCEDURE — 84425 ASSAY OF VITAMIN B-1: CPT | Mod: 90 | Performed by: PSYCHIATRY & NEUROLOGY

## 2019-01-17 PROCEDURE — 86334 IMMUNOFIX E-PHORESIS SERUM: CPT | Performed by: PSYCHIATRY & NEUROLOGY

## 2019-01-17 PROCEDURE — 36415 COLL VENOUS BLD VENIPUNCTURE: CPT | Performed by: PSYCHIATRY & NEUROLOGY

## 2019-01-17 PROCEDURE — 86140 C-REACTIVE PROTEIN: CPT | Performed by: PSYCHIATRY & NEUROLOGY

## 2019-01-17 PROCEDURE — 82607 VITAMIN B-12: CPT | Performed by: PSYCHIATRY & NEUROLOGY

## 2019-01-17 PROCEDURE — 86235 NUCLEAR ANTIGEN ANTIBODY: CPT | Performed by: PSYCHIATRY & NEUROLOGY

## 2019-01-17 PROCEDURE — 84207 ASSAY OF VITAMIN B-6: CPT | Mod: 90 | Performed by: PSYCHIATRY & NEUROLOGY

## 2019-01-17 PROCEDURE — 86038 ANTINUCLEAR ANTIBODIES: CPT | Performed by: PSYCHIATRY & NEUROLOGY

## 2019-01-17 PROCEDURE — 99000 SPECIMEN HANDLING OFFICE-LAB: CPT | Performed by: PSYCHIATRY & NEUROLOGY

## 2019-01-17 PROCEDURE — 99214 OFFICE O/P EST MOD 30 MIN: CPT | Performed by: PSYCHIATRY & NEUROLOGY

## 2019-01-17 PROCEDURE — 83516 IMMUNOASSAY NONANTIBODY: CPT | Performed by: PSYCHIATRY & NEUROLOGY

## 2019-01-17 PROCEDURE — 82784 ASSAY IGA/IGD/IGG/IGM EACH: CPT | Performed by: PSYCHIATRY & NEUROLOGY

## 2019-01-17 PROCEDURE — 85027 COMPLETE CBC AUTOMATED: CPT | Performed by: PSYCHIATRY & NEUROLOGY

## 2019-01-17 PROCEDURE — 80053 COMPREHEN METABOLIC PANEL: CPT | Performed by: PSYCHIATRY & NEUROLOGY

## 2019-01-17 PROCEDURE — 85652 RBC SED RATE AUTOMATED: CPT | Performed by: PSYCHIATRY & NEUROLOGY

## 2019-01-17 PROCEDURE — 83921 ORGANIC ACID SINGLE QUANT: CPT | Mod: 90 | Performed by: PSYCHIATRY & NEUROLOGY

## 2019-01-17 PROCEDURE — 84165 PROTEIN E-PHORESIS SERUM: CPT | Performed by: PSYCHIATRY & NEUROLOGY

## 2019-01-17 PROCEDURE — 82746 ASSAY OF FOLIC ACID SERUM: CPT | Performed by: PSYCHIATRY & NEUROLOGY

## 2019-01-17 PROCEDURE — 84443 ASSAY THYROID STIM HORMONE: CPT | Performed by: PSYCHIATRY & NEUROLOGY

## 2019-01-17 PROCEDURE — 00000402 ZZHCL STATISTIC TOTAL PROTEIN: Performed by: PSYCHIATRY & NEUROLOGY

## 2019-01-17 RX ORDER — GABAPENTIN 300 MG/1
CAPSULE ORAL
Qty: 150 CAPSULE | Refills: 5 | Status: SHIPPED | OUTPATIENT
Start: 2019-01-17 | End: 2019-04-15

## 2019-01-17 ASSESSMENT — MIFFLIN-ST. JEOR: SCORE: 1618.55

## 2019-01-17 NOTE — NURSING NOTE
"Chief Complaint   Patient presents with     RECHECK     neuropathy       Initial /76 (BP Location: Right arm, Patient Position: Chair, Cuff Size: Adult Regular)   Pulse 75   Temp 97.4  F (36.3  C) (Tympanic)   Resp 12   Ht 1.778 m (5' 10\")   Wt 85.7 kg (189 lb)   SpO2 96%   BMI 27.12 kg/m   Estimated body mass index is 27.12 kg/m  as calculated from the following:    Height as of this encounter: 1.778 m (5' 10\").    Weight as of this encounter: 85.7 kg (189 lb).  BP completed using cuff size: regular  Medications and allergies reviewed.      Yolanda RODRIGUEZ MA    "

## 2019-01-17 NOTE — PROGRESS NOTES
ESTABLISHED PATIENT NEUROLOGY NOTE    DATE OF VISIT: 1/17/2019  MRN: [id number]  PATIENT NAME: Antoine Russo  YOB: 1945    Chief Complaint   Patient presents with     RECHECK     neuropathy     SUBJECTIVE:                                                      HISTORY OF PRESENT ILLNESS:  Antoine is here for follow up regarding peripheral neuropathy. I met the patient in consultation for pain in the feet about 2 months ago. Relevant history of diabetes and prior moderate/heavy alcohol use. We decided to do electrodiagnostic testing for confirmation and this study did show predominantly axonal sensorimotor neuropathy. Additional labs for neuropathy have not been completed though I did offer to order these when his nerve conduction studies/EMG results came in.     Antoine has been on gabapentin for symptomatic management. I recommended he increase the dose to: 300mg/300mg/900mg.     The patient tells me that the pain in his feet may be a little bit worse than before. He has zinging into back of his arches now. No new symptoms otherwise. He thinks the gabapentin does help, and denies side effects ont he current dose. He notices his symptoms more during the day. No weakness. No falls. He does wear supportive shoes and has difficulty walking barefoot on hard floors. He does not think he would benefit from seeing Podiatry again. He drinks rarely now and says the last time he had a beer was on Thanksgiving and then the next day he was here in the hospital because his pancreas flared up again (pancreatitis).     He has had dry mouth since his throat surgery. He reminds me that he had surgery for throat cancer- no chemotherapy or radiation was necessary. Denies rash or joint pains.     Please see my initial consultation note dated 11.12.18 for additional historical information.      CURRENT MEDICATIONS:     Current Outpatient Medications on File Prior to Visit:  ASPIRIN NOT PRESCRIBED (INTENTIONAL)  "Antiplatelet medication not prescribed intentionally due to Current anticoagulant therapy (warfarin/enoxaparin)   gabapentin (NEURONTIN) 600 MG tablet Take 1 tablet (600 mg) by mouth At Bedtime Along with 300mg for a total of 900mg for bedtime dose   lisinopril (PRINIVIL/ZESTRIL) 5 MG tablet TAKE 1 TABLET BY MOUTH ONCE DAILY   metFORMIN (GLUCOPHAGE-XR) 500 MG 24 hr tablet TAKE TWO TABLETS BY MOUTH TWICE DAILY WITH MEALS   metoprolol succinate (TOPROL-XL) 50 MG 24 hr tablet Take 1.5 tablets (75 mg) by mouth 2 times daily   multivitamin, therapeutic with minerals (THERA-VIT-M) TABS Take 1 tablet by mouth daily.   tamsulosin (FLOMAX) 0.4 MG capsule Take 1 capsule (0.4 mg) by mouth daily   warfarin (COUMADIN) 2.5 MG tablet 2.5 mg daily or as directed by [facility]   [DISCONTINUED] gabapentin (NEURONTIN) 300 MG capsule Take 1 capsule (300 mg) by mouth 3 times daily Take one tablet (300mg) three times daily.     No current facility-administered medications on file prior to visit.     RECENT DIAGNOSTIC STUDIES:   Labs:   Results for orders placed or performed in visit on 12/27/18   INR point of care   Result Value Ref Range    INR Protime 2.4 (A) 0.86 - 1.14     Nerve conduction studies/EMG scanned into patient's chart under Media tab.     REVIEW OF SYSTEMS:                                                      10-point review of systems is negative except as mentioned above in HPI.     EXAM:                                                      Physical Exam:   Vitals: /76 (BP Location: Right arm, Patient Position: Chair, Cuff Size: Adult Regular)   Pulse 75   Temp 97.4  F (36.3  C) (Tympanic)   Resp 12   Ht 1.778 m (5' 10\")   Wt 85.7 kg (189 lb)   SpO2 96%   BMI 27.12 kg/m    BMI= Body mass index is 27.12 kg/m .  GENERAL: NAD.   HEENT: NC/AT. Multiple scars along the anterior neck.  CV: RRR. S1 and S2.  Focused Neurologic:  MENTAL STATUS: Alert, attentive. Speech is fluent, with normal naming repetition " comprehension.  Normal  concentration. Adequate fund of knowledge.   CRANIAL NERVES: Facial movement normal. EOM full. Hearing intact to conversation. Trapezius strength intact.   MOTOR: 5/5 in proximal and distal muscle groups of lower extremities. Tone and bulk normal.   DTRs: Intact and symmetric in UEs. 2+ at knees. Cannnot elicit ankle jerks. Babinski down-going bilaterally.   SENSATION: Cannot feel pinprick distal to mid-calf. Poor proprioception at great toes. Vibration: Diminished at both ankles.   COORDINATION: Normal finger nose finger. Finger tapping normal.  Knee heel shin normal.  STATION AND GAIT: Romberg negative. Casual gait is antalgic.  EXTR: Feet are flat.   Right hand-dominant.     ASSESSMENT and PLAN:                                                      Assessment and Plan:    ICD-10-CM    1. Peripheral polyneuropathy G62.9 CBC with platelets     Comprehensive metabolic panel     Vitamin B12     Methylmalonic acid     Folate     SSA Ro TORITO Antibody IgG     SSB La TORITO Antibody IgG     Protein electrophoresis     Protein Immunofixation Serum     Vitamin B6     Vitamin B1 whole blood     Anti Nuclear Deacon IgG by IFA with Reflex     CRP inflammation     Erythrocyte sedimentation rate auto     Tissue transglutaminase deacon IgA and IgG     gabapentin (NEURONTIN) 300 MG capsule   2. Thyroid dysfunction E07.9 TSH with free T4 reflex    Rule out        Rafaela Valencia is a pleasant 74 yo man with multiple medical comorbidities including diabetes and prior heavy alcohol use here for follow-up regarding lower extremity neuropathy. We discussed the nerve conduction studies/EMG test results in clinic today. There are two likely explanations for his neuropathy: the alcohol history and the diabetes. We discussed the importance of tight glucose control. He is abstaining from alcohol and I encouraged him to continue. We also discussed various vitamin deficiencies and inflammatory causes for neuropathy. I would like  to check labs today, and patient is in agreement, in case there is another potentially treatable cause for his symptoms. Symptomatically, I recommend we increase the gabapentin since he is tolerating the medication well. I will see him back in a few months, but did ask that he let me know how he is doing in a few weeks in case we need to increase the gabapentin further. The patient understands and agrees with the plan.    Patient Instructions:  Labs for neuropathy today. We will notify you of the results.   Increase the Gabapentin to 600mg/600mg/900mg. Let me know how you are feeling in a few weeks with this change.  Continue to work with your primary care provider regarding blood glucose control.  Continue to abstain from alcohol.   Return to Neurology in 3 months.    Total Time: 25 minutes were spent with the patient. More than 50% of the time spent on counseling 9as described above in Assessment and Plan/Instructions) /coordinating the care.    Anais Justice MD  Neurology

## 2019-01-17 NOTE — LETTER
1/17/2019         RE: Antoine Russo  [address] [address]        Dear Colleague,    Thank you for referring your patient, Antoine Russo, to the [facility]. Please see a copy of my visit note below.    ESTABLISHED PATIENT NEUROLOGY NOTE    DATE OF VISIT: 1/17/2019  MRN: [id number]  PATIENT NAME: Antoine Russo  YOB: 1945    Chief Complaint   Patient presents with     RECHECK     neuropathy     SUBJECTIVE:                                                      HISTORY OF PRESENT ILLNESS:  Antoine is here for follow up regarding peripheral neuropathy. I met the patient in consultation for pain in the feet about 2 months ago. Relevant history of diabetes and prior moderate/heavy alcohol use. We decided to do electrodiagnostic testing for confirmation and this study did show predominantly axonal sensorimotor neuropathy. Additional labs for neuropathy have not been completed though I did offer to order these when his nerve conduction studies/EMG results came in.     Antoine has been on gabapentin for symptomatic management. I recommended he increase the dose to: 300mg/300mg/900mg.     The patient tells me that the pain in his feet may be a little bit worse than before. He has zinging into back of his arches now. No new symptoms otherwise. He thinks the gabapentin does help, and denies side effects ont he current dose. He notices his symptoms more during the day. No weakness. No falls. He does wear supportive shoes and has difficulty walking barefoot on hard floors. He does not think he would benefit from seeing Podiatry again. He drinks rarely now and says the last time he had a beer was on Thanksgiving and then the next day he was here in the hospital because his pancreas flared up again (pancreatitis).     He has had dry mouth since his throat surgery. He reminds me that he had surgery for throat cancer- no chemotherapy or radiation was necessary. Denies  "rash or joint pains.     Please see my initial consultation note dated 11.12.18 for additional historical information.      CURRENT MEDICATIONS:     Current Outpatient Medications on File Prior to Visit:  ASPIRIN NOT PRESCRIBED (INTENTIONAL) Antiplatelet medication not prescribed intentionally due to Current anticoagulant therapy (warfarin/enoxaparin)   gabapentin (NEURONTIN) 600 MG tablet Take 1 tablet (600 mg) by mouth At Bedtime Along with 300mg for a total of 900mg for bedtime dose   lisinopril (PRINIVIL/ZESTRIL) 5 MG tablet TAKE 1 TABLET BY MOUTH ONCE DAILY   metFORMIN (GLUCOPHAGE-XR) 500 MG 24 hr tablet TAKE TWO TABLETS BY MOUTH TWICE DAILY WITH MEALS   metoprolol succinate (TOPROL-XL) 50 MG 24 hr tablet Take 1.5 tablets (75 mg) by mouth 2 times daily   multivitamin, therapeutic with minerals (THERA-VIT-M) TABS Take 1 tablet by mouth daily.   tamsulosin (FLOMAX) 0.4 MG capsule Take 1 capsule (0.4 mg) by mouth daily   warfarin (COUMADIN) 2.5 MG tablet 2.5 mg daily or as directed by [facility]   [DISCONTINUED] gabapentin (NEURONTIN) 300 MG capsule Take 1 capsule (300 mg) by mouth 3 times daily Take one tablet (300mg) three times daily.     No current facility-administered medications on file prior to visit.     RECENT DIAGNOSTIC STUDIES:   Labs:   Results for orders placed or performed in visit on 12/27/18   INR point of care   Result Value Ref Range    INR Protime 2.4 (A) 0.86 - 1.14     Nerve conduction studies/EMG scanned into patient's chart under Media tab.     REVIEW OF SYSTEMS:                                                      10-point review of systems is negative except as mentioned above in HPI.     EXAM:                                                      Physical Exam:   Vitals: /76 (BP Location: Right arm, Patient Position: Chair, Cuff Size: Adult Regular)   Pulse 75   Temp 97.4  F (36.3  C) (Tympanic)   Resp 12   Ht 1.778 m (5' 10\")   Wt 85.7 kg (189 lb)   SpO2 96%   BMI 27.12 kg/m   "   BMI= Body mass index is 27.12 kg/m .  GENERAL: NAD.   HEENT: NC/AT. Multiple scars along the anterior neck.  CV: RRR. S1 and S2.  Focused Neurologic:  MENTAL STATUS: Alert, attentive. Speech is fluent, with normal naming repetition comprehension.  Normal  concentration. Adequate fund of knowledge.   CRANIAL NERVES: Facial movement normal. EOM full. Hearing intact to conversation. Trapezius strength intact.   MOTOR: 5/5 in proximal and distal muscle groups of lower extremities. Tone and bulk normal.   DTRs: Intact and symmetric in UEs. 2+ at knees.  Cannnot elicit ankle jerks. Babinski down-going bilaterally.   SENSATION: Cannot feel pinprick distal to mid-calf. Poor proprioception at great toes. Vibration: Diminished at both ankles.   COORDINATION: Normal finger nose finger. Finger tapping normal.  Knee heel shin normal.  STATION AND GAIT: Romberg negative. Casual gait is antalgic.  EXTR: Feet are flat.   Right hand-dominant.     ASSESSMENT and PLAN:                                                      Assessment and Plan:    ICD-10-CM    1. Peripheral polyneuropathy G62.9 CBC with platelets     Comprehensive metabolic panel     Vitamin B12     Methylmalonic acid     Folate     SSA Ro TORITO Antibody IgG     SSB La TORITO Antibody IgG     Protein electrophoresis     Protein Immunofixation Serum     Vitamin B6     Vitamin B1 whole blood     Anti Nuclear Deacon IgG by IFA with Reflex     CRP inflammation     Erythrocyte sedimentation rate auto     Tissue transglutaminase deacon IgA and IgG     gabapentin (NEURONTIN) 300 MG capsule   2. Thyroid dysfunction E07.9 TSH with free T4 reflex    Rule out        Rafaela Valencia is a pleasant 74 yo man with multiple medical comorbidities including diabetes and prior heavy alcohol use here for follow-up regarding lower extremity neuropathy. We discussed the nerve conduction studies/EMG test results in clinic today. There are two likely explanations for his neuropathy: the alcohol history  and the diabetes. We discussed the importance of tight glucose control. He is abstaining from alcohol and I encouraged him to continue. We also discussed various vitamin deficiencies and inflammatory causes for neuropathy. I would like to check labs today, and patient is in agreement, in case there is another potentially treatable cause for his symptoms. Symptomatically, I recommend we increase the gabapentin since he is tolerating the medication well. I will see him back in a few months, but did ask that he let me know how he is doing in a few weeks in case we need to increase the gabapentin further. The patient understands and agrees with the plan.    Patient Instructions:  Labs for neuropathy today. We will notify you of the results.   Increase the Gabapentin to 600mg/600mg/900mg. Let me know how you are feeling in a few weeks with this change.  Continue to work with your primary care provider regarding blood glucose control.  Continue to abstain from alcohol.   Return to Neurology in 3 months.    Total Time: 25 minutes were spent with the patient. More than 50% of the time spent on counseling 9as described above in Assessment and Plan/Instructions) /coordinating the care.    Anais Justice MD  Neurology                    Again, thank you for allowing me to participate in the care of your patient.        Sincerely,        Anais Justice MD

## 2019-01-17 NOTE — LETTER
[facility]  [address]  [facility] [address]  [phone]          January 24, 2019    Antoine Russo                                                                                                                     [address] [place] [id number]          Dear Dr. Reshma Schultz has reviewed the results of your labs of 1/17/18, and has made the following observations:      A few of your lab results were mildly abnormal.     1) The vitamin B1 and B6 levels are a little high. If you are taking a B-complex or multivitamin, I might recommend you decrease to every other day supplementation - otherwise the results are likely related to recent dietary intake.     2) Your B12 level was a little on the low side. You should be on a daily B12 vitamin: 1000mcg per day.     3) There is evidence of mild inflammation (improved from prior testing). You could consider further testing for the cause through your primary care provider. But again, the findings are mild.     4)  Blood sugar is way too high.     5) There is one mildly abnormal immunoglobulin. For this I would recommend we retest the level in the blood and urine. Orders are in.     You can have these labs collected at any [facility] at your convenience.    Please don't hesitate to contact our office with any additional questions or concerns.       Sincerely,     PATO Berumen  For Anais Justice MD

## 2019-01-17 NOTE — PATIENT INSTRUCTIONS
Plan:    Labs for neuropathy today. We will notify you of the results.   Increase the Gabapentin to 600mg/600mg/900mg. Let me know how you are feeling in a few weeks with this change.  Continue to work with your primary care provider regarding blood glucose control.  Continue to abstain from alcohol.   Return to Neurology in 3 months.

## 2019-01-18 LAB
ALBUMIN SERPL ELPH-MCNC: 4 G/DL (ref 3.7–5.1)
ALPHA1 GLOB SERPL ELPH-MCNC: 0.3 G/DL (ref 0.2–0.4)
ALPHA2 GLOB SERPL ELPH-MCNC: 0.9 G/DL (ref 0.5–0.9)
B-GLOBULIN SERPL ELPH-MCNC: 0.8 G/DL (ref 0.6–1)
GAMMA GLOB SERPL ELPH-MCNC: 1 G/DL (ref 0.7–1.6)
IGA SERPL-MCNC: 320 MG/DL (ref 70–380)
IGG SERPL-MCNC: 1070 MG/DL (ref 695–1620)
IGM SERPL-MCNC: 70 MG/DL (ref 60–265)
M PROTEIN SERPL ELPH-MCNC: 0 G/DL
PROT PATTERN SERPL ELPH-IMP: NORMAL
PROT PATTERN SERPL IFE-IMP: NORMAL
TTG IGA SER-ACNC: 1 U/ML
TTG IGG SER-ACNC: <1 U/ML

## 2019-01-19 LAB
METHYLMALONATE SERPL-SCNC: 0.25 UMOL/L (ref 0–0.4)
VIT B6 SERPL-MCNC: 131.5 NMOL/L (ref 20–125)

## 2019-01-20 LAB — VIT B1 BLD-MCNC: 198 NMOL/L (ref 70–180)

## 2019-01-22 LAB — ANA SER QL IF: NEGATIVE

## 2019-01-23 DIAGNOSIS — G62.89 OTHER POLYNEUROPATHY: Primary | ICD-10-CM

## 2019-01-23 DIAGNOSIS — G62.9 PERIPHERAL POLYNEUROPATHY: Primary | ICD-10-CM

## 2019-01-23 NOTE — RESULT ENCOUNTER NOTE
Please advise Antoine Russo,  1945, that a few of his lab results were mildly abnormal.  1. The B1 and B6 vitamin levels are a little high. If he is taking a B-complex or multivitamin, I might recommend he decrease to every other day supplementation - otherwise the results are likely related to recent dietary intake.   2. He should be on a daily B12 vitamin: 1000mcg per day. His B12 level was a little on the low side.   3. There is evidence of mild inflammation (improved from prior testing). He could consider further testing for the cause through his primary care provider. But again, the findings are mild.   4. Blood sugar is way too high.   5. There is one mildly abnormal immunoglobulin. For this I would recommend we retest the level in the blood and urine. Orders are in.     [phone] (home)   Anais Justice

## 2019-02-07 ENCOUNTER — ANTICOAGULATION THERAPY VISIT (OUTPATIENT)
Dept: ANTICOAGULATION | Facility: CLINIC | Age: 74
End: 2019-02-07
Payer: COMMERCIAL

## 2019-02-07 DIAGNOSIS — I48.20 CHRONIC ATRIAL FIBRILLATION (H): ICD-10-CM

## 2019-02-07 DIAGNOSIS — Z79.01 LONG TERM CURRENT USE OF ANTICOAGULANT THERAPY: ICD-10-CM

## 2019-02-07 LAB — INR POINT OF CARE: 3.1 (ref 0.86–1.14)

## 2019-02-07 PROCEDURE — 99207 ZZC NO CHARGE NURSE ONLY: CPT

## 2019-02-07 PROCEDURE — 85610 PROTHROMBIN TIME: CPT | Mod: QW

## 2019-02-07 PROCEDURE — 36416 COLLJ CAPILLARY BLOOD SPEC: CPT

## 2019-02-07 NOTE — PROGRESS NOTES
ANTICOAGULATION FOLLOW-UP CLINIC VISIT    Patient Name:  Antoine Russo  Date:  2/7/2019  Contact Type:  Face to Face    SUBJECTIVE:     Patient Findings     Positives:   No Problem Findings    Comments:   No changes in medications, diet, or activity. No concerns with bleeding or bruising. Took warfarin as prescribed.   Continue maintenance warfarin dose. Will recheck INR in 6 weeks.   Patient verbalizes understanding and agrees with plan. No further questions at this time.              OBJECTIVE    INR Protime   Date Value Ref Range Status   02/07/2019 3.1 (A) 0.86 - 1.14 Final       ASSESSMENT / PLAN  INR assessment THER    Recheck INR In: 6 WEEKS    INR Location Clinic      Anticoagulation Summary  As of 2/7/2019    INR goal:   2.0-3.0   TTR:   63.3 % (9.1 mo)   INR used for dosing:   3.1! (2/7/2019)   Warfarin maintenance plan:   2.5 mg (2.5 mg x 1) every day   Full warfarin instructions:   2.5 mg every day   Weekly warfarin total:   17.5 mg   No change documented:   Pamela Isabel RN   Plan last modified:   Angela Harrell RN (6/21/2018)   Next INR check:   3/21/2019   Priority:   INR   Target end date:   Indefinite    Indications    Chronic atrial fibrillation (H) [I48.2]  Long term current use of anticoagulant therapy [Z79.01]             Anticoagulation Episode Summary     INR check location:       Preferred lab:       Send INR reminders to:   [facility] CLINIC POOL    Comments:   * 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

## 2019-02-13 DIAGNOSIS — R39.15 URGENCY OF URINATION: ICD-10-CM

## 2019-02-13 DIAGNOSIS — N40.0 HYPERTROPHY OF PROSTATE WITHOUT URINARY OBSTRUCTION: ICD-10-CM

## 2019-02-14 RX ORDER — TAMSULOSIN HYDROCHLORIDE 0.4 MG/1
CAPSULE ORAL
Qty: 90 CAPSULE | Refills: 2 | Status: SHIPPED | OUTPATIENT
Start: 2019-02-14 | End: 2019-11-17

## 2019-02-14 NOTE — TELEPHONE ENCOUNTER
Prescription approved per [facility] Refill Protocol.  Velma JONES RN

## 2019-03-12 ENCOUNTER — OFFICE VISIT (OUTPATIENT)
Dept: FAMILY MEDICINE | Facility: CLINIC | Age: 74
End: 2019-03-12
Payer: COMMERCIAL

## 2019-03-12 VITALS
TEMPERATURE: 97 F | HEART RATE: 77 BPM | OXYGEN SATURATION: 97 % | BODY MASS INDEX: 27.69 KG/M2 | SYSTOLIC BLOOD PRESSURE: 118 MMHG | WEIGHT: 193 LBS | DIASTOLIC BLOOD PRESSURE: 80 MMHG | RESPIRATION RATE: 16 BRPM

## 2019-03-12 DIAGNOSIS — I48.20 CHRONIC ATRIAL FIBRILLATION (H): ICD-10-CM

## 2019-03-12 DIAGNOSIS — M79.622 PAIN OF LEFT UPPER ARM: ICD-10-CM

## 2019-03-12 DIAGNOSIS — I10 HYPERTENSION, BENIGN ESSENTIAL, GOAL BELOW 140/90: ICD-10-CM

## 2019-03-12 DIAGNOSIS — E11.42 TYPE 2 DIABETES MELLITUS WITH DIABETIC POLYNEUROPATHY, WITHOUT LONG-TERM CURRENT USE OF INSULIN (H): Primary | Chronic | ICD-10-CM

## 2019-03-12 DIAGNOSIS — Z23 NEED FOR PROPHYLACTIC VACCINATION WITH TETANUS-DIPHTHERIA (TD): ICD-10-CM

## 2019-03-12 LAB
CREAT UR-MCNC: 74 MG/DL
HBA1C MFR BLD: 13 % (ref 0–5.6)
MICROALBUMIN UR-MCNC: 328 MG/L
MICROALBUMIN/CREAT UR: 443.24 MG/G CR (ref 0–17)

## 2019-03-12 PROCEDURE — 36415 COLL VENOUS BLD VENIPUNCTURE: CPT | Performed by: FAMILY MEDICINE

## 2019-03-12 PROCEDURE — 83036 HEMOGLOBIN GLYCOSYLATED A1C: CPT | Performed by: FAMILY MEDICINE

## 2019-03-12 PROCEDURE — 90715 TDAP VACCINE 7 YRS/> IM: CPT | Performed by: FAMILY MEDICINE

## 2019-03-12 PROCEDURE — 90471 IMMUNIZATION ADMIN: CPT | Performed by: FAMILY MEDICINE

## 2019-03-12 PROCEDURE — 99214 OFFICE O/P EST MOD 30 MIN: CPT | Mod: 25 | Performed by: FAMILY MEDICINE

## 2019-03-12 PROCEDURE — 82043 UR ALBUMIN QUANTITATIVE: CPT | Performed by: FAMILY MEDICINE

## 2019-03-12 RX ORDER — GLIPIZIDE 5 MG/1
5 TABLET, FILM COATED, EXTENDED RELEASE ORAL DAILY
Qty: 60 TABLET | Refills: 3 | Status: ON HOLD | OUTPATIENT
Start: 2019-03-12 | End: 2019-04-19

## 2019-03-12 NOTE — PATIENT INSTRUCTIONS
Stop the metformin    Start the glipizide    See Ruthann    Get the MRI done    See me back in 3 months

## 2019-03-12 NOTE — NURSING NOTE
"Chief Complaint   Patient presents with     Diabetes     Musculoskeletal Problem     left arm pain x 1 month       Initial /80 (BP Location: Right arm)   Pulse 77   Temp 97  F (36.1  C) (Tympanic)   Resp 16   Wt 87.5 kg (193 lb)   SpO2 97%   BMI 27.69 kg/m   Estimated body mass index is 27.69 kg/m  as calculated from the following:    Height as of 1/17/19: 1.778 m (5' 10\").    Weight as of this encounter: 87.5 kg (193 lb).    Patient presents to the clinic using No DME    Health Maintenance that is potentially due pending provider review:  NONE    n/a    Is there anyone who you would like to be able to receive your results? No  If yes have patient fill out VIDYA    "

## 2019-03-12 NOTE — Clinical Note
Lab Results     Not sure what happened with Poli. I am sending him to youComponent                Value               Date                     A1C                      13.0                03/12/2019               A1C                      7.6                 11/23/2018               A1C                      6.9                 10/22/2018               A1C                      7.1                 08/23/2018               A1C                      7.7                 05/22/2018

## 2019-03-12 NOTE — PROGRESS NOTES
SUBJECTIVE:   Antoine Russo is a 73 year old male who presents to clinic today for the following health issues:      Diabetes Follow-up      Patient is checking blood sugars: not at all    Diabetic concerns: None     Symptoms of hypoglycemia (low blood sugar): none     Paresthesias (numbness or burning in feet) or sores: Yes      Date of last diabetic eye exam: 2018    The metformin causes him diarrhea. Going from 2 to one bid did help but still has diarrhea.     BP Readings from Last 2 Encounters:   03/12/19 118/80   01/17/19 122/76     Hemoglobin A1C (%)   Date Value   11/23/2018 7.6 (H)   10/22/2018 6.9 (H)     LDL Cholesterol Calculated (mg/dL)   Date Value   10/22/2018 90   03/16/2018 69       Diabetes Management Resources    Amount of exercise or physical activity: None    Problems taking medications regularly: No    Medication side effects: none    Diet: diabetic    Atrial fibrillation-on metoprolol for rate control, and warfarin for anticoagulation    Hypertension-on lisinopril, metoprolol. No chest pain or shortness of breath     Left arm pain x 1 month - no injury  Hurts to move it a certain way, hurts to raise it up. Had a bone spur fixed in 2008.   Doesn't catch like it did then.     Problem list and histories reviewed & adjusted, as indicated.  Additional history: as documented    BP Readings from Last 3 Encounters:   03/12/19 118/80   01/17/19 122/76   12/06/18 138/88    Wt Readings from Last 3 Encounters:   03/12/19 87.5 kg (193 lb)   01/17/19 85.7 kg (189 lb)   12/06/18 85.7 kg (189 lb)              Reviewed and updated as needed this visit by clinical staff  Tobacco  Allergies  Meds  Med Hx  Surg Hx  Fam Hx  Soc Hx      Reviewed and updated as needed this visit by Provider         ROS:  CONSTITUTIONAL: NEGATIVE for fever, chills, change in weight  ENT/MOUTH: NEGATIVE for ear, mouth and throat problems  RESP: NEGATIVE for significant cough or SOB  CV: NEGATIVE for chest pain,  palpitations or peripheral edema  GI: NEGATIVE for nausea, abdominal pain, heartburn, or change in bowel habits  : negative for dysuria, hematuria, decreased urinary stream, erectile dysfunction    OBJECTIVE:                                                    /80 (BP Location: Right arm)   Pulse 77   Temp 97  F (36.1  C) (Tympanic)   Resp 16   Wt 87.5 kg (193 lb)   SpO2 97%   BMI 27.69 kg/m    Body mass index is 27.69 kg/m .  GENERAL: healthy, alert, well nourished, well hydrated, no distress  HENT: ear canals- normal; TMs- normal; Nose- normal; Mouth- no ulcers, no lesions  NECK: no tenderness, no adenopathy, no asymmetry, no masses, no stiffness; thyroid- normal to palpation  RESP: lungs clear to auscultation - no rales, no rhonchi, no wheezes  CV: regular rates and rhythm, normal S1 S2, no S3 or S4 and no murmur, no click or rub -  ABDOMEN: soft, no tenderness, no  hepatosplenomegaly, no masses, normal bowel sounds  MS: extremities- no gross deformities noted, no edema  Shoulder: Symmetric without erythema, ecchymoses, warmth, or edema. No tenderness to palpation around shoulder joint. There no pain with internal or external rotation.  No pain with flexion, extension, abduction or adduction. There is no weakness in any rotator cuffs. EXT: pulses intact. Sensation to light touch intact.     Results for orders placed or performed in visit on 03/12/19   Albumin Random Urine Quantitative with Creat Ratio   Result Value Ref Range    Creatinine Urine 74 mg/dL    Albumin Urine mg/L 328 mg/L    Albumin Urine mg/g Cr 443.24 (H) 0 - 17 mg/g Cr   Hemoglobin A1c   Result Value Ref Range    Hemoglobin A1C 13.0 (H) 0 - 5.6 %           ASSESSMENT/PLAN:                                                      ASSESSMENT:  1. Type 2 diabetes mellitus with diabetic polyneuropathy, without long-term current use of insulin (H)    2. Need for prophylactic vaccination with tetanus-diphtheria (Td)    3. Pain of left upper arm     4. Chronic atrial fibrillation (H)    5. Hypertension, benign essential, goal below 140/90        PLAN:  Orders Placed This Encounter     SCREENING QUESTIONS FOR ADULT IMMUNIZATIONS     MR Shoulder Left w/o Contrast     TDAP VACCINE (ADACEL)     Albumin Random Urine Quantitative with Creat Ratio     Hemoglobin A1c     DIABETES EDUCATOR REFERRAL     glipiZIDE (GLUCOTROL XL) 5 MG 24 hr tablet     For shoulder, offered physical therapy, ortho, MRI. He'd like to get the MRI and then decide   Will stop the metformin due to diarrhea, but his A1C really jumped I wonder if his recurrent pancreatitis has something to do with that. We discussed insulin, but he is very reluctant to have to check blood sugars. Will restart glipizide, rasheeda one of us could remember why he stopped it last year, maybe didn't need it. I will ask him to see Ruthann Alaniz,  Perhaps we can try Victoza or similar.     Patient Instructions   Stop the metformin    Start the glipizide    See Ruthann    Get the MRI done    See me back in 3 months     Katie Gross MD  [facility]

## 2019-03-18 ENCOUNTER — ALLIED HEALTH/NURSE VISIT (OUTPATIENT)
Dept: EDUCATION SERVICES | Facility: CLINIC | Age: 74
End: 2019-03-18
Payer: COMMERCIAL

## 2019-03-18 ENCOUNTER — TELEPHONE (OUTPATIENT)
Dept: EDUCATION SERVICES | Facility: CLINIC | Age: 74
End: 2019-03-18

## 2019-03-18 DIAGNOSIS — E11.42 TYPE 2 DIABETES MELLITUS WITH DIABETIC POLYNEUROPATHY, WITHOUT LONG-TERM CURRENT USE OF INSULIN (H): Primary | Chronic | ICD-10-CM

## 2019-03-18 PROCEDURE — G0108 DIAB MANAGE TRN  PER INDIV: HCPCS | Performed by: DIETITIAN, REGISTERED

## 2019-03-18 NOTE — PROGRESS NOTES
"Diabetes Self-Management Education & Support    Diabetes Education Self Management & Training    SUBJECTIVE/OBJECTIVE:  Presents for: Individual review  Accompanied by: Self  Diabetes education in the past 24mo: No  Focus of Visit: Taking Medication, Healthy Coping, Monitoring  Diabetes type: Type 2  Disease course: Worsening  How confident are you filling out medical forms by yourself:: Not Assessed  Transportation concerns: No  Other concerns:: None  Cultural Influences/Ethnic Background:  American      Diabetes Symptoms & Complications          Patient Problem List and Family Medical History reviewed for relevant medical history, current medical status, and diabetes risk factors.    Vitals:  There were no vitals taken for this visit.  Estimated body mass index is 27.69 kg/m  as calculated from the following:    Height as of 1/17/19: 1.778 m (5' 10\").    Weight as of 3/12/19: 87.5 kg (193 lb).   Last 3 BP:   BP Readings from Last 3 Encounters:   03/12/19 118/80   01/17/19 122/76   12/06/18 138/88       History   Smoking Status     Former Smoker     Packs/day: 1.00     Years: 40.00     Types: Cigarettes     Quit date: 11/24/2006   Smokeless Tobacco     Never Used       Labs:  Lab Results   Component Value Date    A1C 13.0 03/12/2019     Lab Results   Component Value Date     01/17/2019     Lab Results   Component Value Date    LDL 90 10/22/2018     HDL Cholesterol   Date Value Ref Range Status   10/22/2018 43 >39 mg/dL Final   ]  GFR Estimate   Date Value Ref Range Status   01/17/2019 80 >60 mL/min/[1.73_m2] Final     Comment:     Non  GFR Calc  Starting 12/18/2018, serum creatinine based estimated GFR (eGFR) will be   calculated using the Chronic Kidney Disease Epidemiology Collaboration   (CKD-EPI) equation.       GFR Estimate If Black   Date Value Ref Range Status   01/17/2019 >90 >60 mL/min/[1.73_m2] Final     Comment:      GFR Calc  Starting 12/18/2018, serum creatinine " based estimated GFR (eGFR) will be   calculated using the Chronic Kidney Disease Epidemiology Collaboration   (CKD-EPI) equation.       Lab Results   Component Value Date    CR 0.95 01/17/2019     No results found for: MICROALBUMIN    Healthy Eating  Healthy Eating Assessed Today: Yes  Cultural/Oriental orthodox diet restrictions?: No  Breakfast: chicken nuggets with ranch dressing, coffee or potstickers  Dinner: meatloaf, potatoes or hot beef or pork chops, salad  Snacks: not a big snackers, dots pretzels, apple slices  Beverages: Water, Coffee, Diet soda  Has patient met with a dietitian in the past?: No    Being Active  Being Active Assessed Today: Yes  Exercise:: (not able to do much in ice, snow)    Monitoring  Monitoring Assessed Today: (not functioning meter)  Did patient bring glucose meter to appointment? : No    424 in clinic today    Taking Medications  Diabetes Medication(s)     Sulfonylureas       glipiZIDE (GLUCOTROL XL) 5 MG 24 hr tablet    Take 1 tablet (5 mg) by mouth daily               Problem Solving   not assessed              Reducing Risks   discussed some with elevation of a1c    Healthy Coping     Patient Activation Measure Survey Score:  RIDDHI Score (Last Two) 2/15/2011   RIDDHI Raw Score 41   Activation Score 63.2   RIDDHI Level 3       ASSESSMENT:  PT  in clinic today, needs insulin. Educated on that today, no issues with demonstration.        Patient's most recent   Lab Results   Component Value Date    A1C 13.0 03/12/2019    is not meeting goal of <7.0    INTERVENTION:   Diabetes knowledge and skills assessment:     Patient is knowledgeable in diabetes management concepts related to: Healthy Eating, Being Active, Monitoring and Taking Medication    Patient needs further education on the following diabetes management concepts: Healthy Eating, Being Active, Monitoring, Taking Medication, Problem Solving, Reducing Risks and Healthy Coping    Based on learning assessment above, most appropriate  setting for further diabetes education would be: Individual setting.    Education provided today on:  AADE Self-Care Behaviors:  Healthy Eating: consistency in amount, composition, and timing of food intake  Monitoring: individual blood glucose targets and frequency of monitoring  Taking Medication: action of prescribed medication, drawing up, administering and storing injectable diabetes medications, proper site selection and rotation for injections, side effects of prescribed medications and when to take medications  Problem Solving: low blood glucose - causes, signs/symptoms, treatment and prevention and carrying a carbohydrate source at all times    Opportunities for ongoing education and support in diabetes-self management were discussed.    Pt verbalized understanding of concepts discussed and recommendations provided today.       Education Materials Provided:  BG Log Sheet and tesiba Insulin information    PLAN:  See Patient Instructions for co-developed, patient-stated behavior change goals.  AVS printed and provided to patient today. See Follow-Up section for recommended follow-up.      Time Spent: 60 minutes  Encounter Type: Individual    Any diabetes medication dose changes were made via the CDE Protocol and Collaborative Practice Agreement with the patient's primary care provider. A copy of this encounter was shared with the provider.

## 2019-03-18 NOTE — PATIENT INSTRUCTIONS
"If Dr. Martino is in agreement to starting insulin I will call you.    Start taking your blood sugars twice daily.  In the morning before breakfast and before supper.    Taking Insulin:    1. Take Tresiba - 16 units at evening (increase by 2 units every 4 days until am readings are under 120)    - Do a 2 unit \"prime\" before each injection, be sure a stream of insulin comes out of the needle before you give your injection.    - After you inject, hold the needle under the skin to the count of 10 to be sure all of the insulin goes in.     - Rotate injection sites, keeping at least 1 inch apart from last injection site and 2 inches away from belly button or surgical scars.    2. Pen - Use a new pen needle for each injection. Always remove pen needle from the insulin pen after use and do not store insulin pens with the needle on the pen.     3. Store insulin you are not using in the refrigerator (do not freeze). Take new insulin out of the refrigerator a few hours prior to use to bring to room temperature.     4. Once opened Tresiba can be kept at room temperature for 56 days after opened.. Do not use the opened insulin after this time has passed, even if there is still medicine inside.     5. Always carry your blood sugar meter and a sugar source like glucose tablets with you in case of a low blood sugar. Treat a low blood sugar (less than 70) with 15 grams of carbohydrate (1 carb choice). Wait 15 minutes, recheck blood sugar. If blood sugar is still below 70, repeat the treatment.    6. Wear Medical ID or carry a wallet card stating you have Diabetes.    7. Call your doctor or diabetes educator if you begin having low blood sugars or if you have questions or concerns.   Call me in one week with blood sugars.  [phone].  Ruthann Alaniz     8. Complete and mail in the form to inform the [place] [employer] that you have started taking insulin.      "

## 2019-03-18 NOTE — TELEPHONE ENCOUNTER
Dr. Martino,    Got Poli set up with meter again.  His blood sugar in clinic was 424.  Suggest starting insulin.    GLP1 not a good choice due to pancreatitis     If you are in agreement with starting I suggest Tresiba U200 16 unit every bedtime (increase by 2 units every 4 days until am readings are under 120)    Thanks! Ruthann Alaniz RD, CDE

## 2019-03-19 ENCOUNTER — HOSPITAL ENCOUNTER (OUTPATIENT)
Dept: MRI IMAGING | Facility: CLINIC | Age: 74
Discharge: HOME | End: 2019-03-19
Attending: FAMILY MEDICINE | Admitting: FAMILY MEDICINE
Payer: COMMERCIAL

## 2019-03-19 DIAGNOSIS — M79.622 PAIN OF LEFT UPPER ARM: ICD-10-CM

## 2019-03-19 PROCEDURE — 73221 MRI JOINT UPR EXTREM W/O DYE: CPT | Mod: LT

## 2019-03-21 ENCOUNTER — ANTICOAGULATION THERAPY VISIT (OUTPATIENT)
Dept: ANTICOAGULATION | Facility: CLINIC | Age: 74
End: 2019-03-21
Payer: COMMERCIAL

## 2019-03-21 DIAGNOSIS — Z79.01 LONG TERM CURRENT USE OF ANTICOAGULANT THERAPY: ICD-10-CM

## 2019-03-21 DIAGNOSIS — I48.20 CHRONIC ATRIAL FIBRILLATION (H): ICD-10-CM

## 2019-03-21 LAB — INR POINT OF CARE: 2.5 (ref 0.86–1.14)

## 2019-03-21 PROCEDURE — 99207 ZZC NO CHARGE NURSE ONLY: CPT

## 2019-03-21 PROCEDURE — 85610 PROTHROMBIN TIME: CPT | Mod: QW

## 2019-03-21 PROCEDURE — 36416 COLLJ CAPILLARY BLOOD SPEC: CPT

## 2019-03-21 NOTE — PROGRESS NOTES
ANTICOAGULATION FOLLOW-UP CLINIC VISIT    Patient Name:  Antoine Russo  Date:  3/21/2019  Contact Type:  Face to Face    SUBJECTIVE:     Patient Findings     Positives:   Change in medications (glipizide started and metformin stopped. Started tresiba injections on 3-18)    Comments:   No changes in diet, activity level, or health. No missed doses of warfarin. Patient took dosing as prescribed. No signs of clots or bleeding concerns. Patient will continue maintenance warfarin dosing.             OBJECTIVE    INR Protime   Date Value Ref Range Status   2019 2.5 (A) 0.86 - 1.14 Final       ASSESSMENT / PLAN  INR assessment THER    Recheck INR In: 6 WEEKS    INR Location Clinic      Anticoagulation Summary  As of 3/21/2019    INR goal:   2.0-3.0   TTR:   65.9 % (10.5 mo)   INR used for dosin.5 (3/21/2019)   Warfarin maintenance plan:   2.5 mg (2.5 mg x 1) every day   Full warfarin instructions:   2.5 mg every day   Weekly warfarin total:   17.5 mg   No change documented:   Aparna Kunz RN   Plan last modified:   Angela Harrell RN (2018)   Next INR check:   2019   Priority:   INR   Target end date:   Indefinite    Indications    Chronic atrial fibrillation (H) [I48.2]  Long term current use of anticoagulant therapy [Z79.01]             Anticoagulation Episode Summary     INR check location:       Preferred lab:       Send INR reminders to:   NB ANTICO CLINIC POOL    Comments:   * 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

## 2019-03-26 ENCOUNTER — PATIENT OUTREACH (OUTPATIENT)
Dept: EDUCATION SERVICES | Facility: CLINIC | Age: 74
End: 2019-03-26
Payer: COMMERCIAL

## 2019-03-26 DIAGNOSIS — E11.42 TYPE 2 DIABETES MELLITUS WITH DIABETIC POLYNEUROPATHY, WITHOUT LONG-TERM CURRENT USE OF INSULIN (H): Chronic | ICD-10-CM

## 2019-03-26 NOTE — PROGRESS NOTES
Diabetes education contact:    Pt BG are running right around 200 in am, did have one 170.  He moved Tresiba up to 18 units, I will have him go up to 22 units tonight.  HE will continue to increase every 4 days as instructed.  I told him to call me again in one week.    Ruthann Alaniz RD on 3/26/2019 at 2:05 PM      Any diabetes medication dose changes were made via the CDE Protocol and Collaborative Practice Agreement with the patient's primary care provider. A copy of this encounter was shared with the provider.

## 2019-04-15 ENCOUNTER — OFFICE VISIT (OUTPATIENT)
Dept: NEUROLOGY | Facility: CLINIC | Age: 74
End: 2019-04-15
Payer: COMMERCIAL

## 2019-04-15 VITALS
WEIGHT: 196.2 LBS | OXYGEN SATURATION: 97 % | TEMPERATURE: 97.4 F | HEART RATE: 76 BPM | BODY MASS INDEX: 28.09 KG/M2 | DIASTOLIC BLOOD PRESSURE: 66 MMHG | SYSTOLIC BLOOD PRESSURE: 140 MMHG | HEIGHT: 70 IN | RESPIRATION RATE: 16 BRPM

## 2019-04-15 DIAGNOSIS — G62.9 PERIPHERAL POLYNEUROPATHY: ICD-10-CM

## 2019-04-15 PROCEDURE — 99214 OFFICE O/P EST MOD 30 MIN: CPT | Performed by: PSYCHIATRY & NEUROLOGY

## 2019-04-15 RX ORDER — GABAPENTIN 300 MG/1
300 CAPSULE ORAL AT BEDTIME
Qty: 90 CAPSULE | Refills: 2 | Status: SHIPPED | OUTPATIENT
Start: 2019-04-15 | End: 2019-10-17

## 2019-04-15 RX ORDER — GABAPENTIN 600 MG/1
600 TABLET ORAL 3 TIMES DAILY
Qty: 270 TABLET | Refills: 2 | Status: SHIPPED | OUTPATIENT
Start: 2019-04-15 | End: 2020-01-22

## 2019-04-15 ASSESSMENT — MIFFLIN-ST. JEOR: SCORE: 1651.21

## 2019-04-15 NOTE — NURSING NOTE
"Chief Complaint   Patient presents with     RECHECK     peripherial neuropathy       Initial /66 (BP Location: Right arm, Patient Position: Chair, Cuff Size: Adult Regular)   Pulse 76   Temp 97.4  F (36.3  C) (Oral)   Resp 16   Ht 1.778 m (5' 10\")   Wt 89 kg (196 lb 3.2 oz)   SpO2 97%   BMI 28.15 kg/m   Estimated body mass index is 28.15 kg/m  as calculated from the following:    Height as of this encounter: 1.778 m (5' 10\").    Weight as of this encounter: 89 kg (196 lb 3.2 oz).  BP completed using cuff size: regular  Medications and allergies reviewed.      Yolanda RODRIGUEZ MA    "

## 2019-04-15 NOTE — LETTER
4/15/2019         RE: Antoine Russo  [address] [address]        Dear Colleague,    Thank you for referring your patient, Antoine Russo, to the [facility]. Please see a copy of my visit note below.    ESTABLISHED PATIENT NEUROLOGY NOTE    DATE OF VISIT: 4/15/2019  MRN: [id number]  PATIENT NAME: Antoine Russo  YOB: 1945    Chief Complaint   Patient presents with     RECHECK     peripherial neuropathy     SUBJECTIVE:                                                      HISTORY OF PRESENT ILLNESS:  Antoine is here for follow up regarding peripheral neuropathy. He has history of diabetes and prior moderate/heavy alcohol use. 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 We did additional labs for neuropathy after his previous visit, despite the known risk factors. He had stopped drinking alcohol and working on tight glucose control at my recommendation (though his glucose was 370). His B1 and B6 were a little high, so I recommended he cut back to QOD vitamin supplement. I wanted him to add B12, though as this was low-normal at 383. He had mildly elevated IgG so I recommended we recheck this in serum and urine. I do not see that the retesting was done. He is on gabapentin for symptomatic management. He reported slight worsening of the foot pain, so I recommended he increase the dose to 600mg/600mg/900mg.     Today the patient says that the feet are the same. He has pain that reaches to the heels. He says they also feel numb. No weakness or problems with balance. He does not notice similar symptoms in the hands.     He is now on insulin and glipizide and says his glucose has improved. It tends to be lower in the afternoon than morning.     Things are going well in terms of the gabapentin. He would like to get more of the 600mg tablets to make it easier to continue the 600mg/600mg/900mg dosing. Otherwise no concerns about the  medication. He takes a multivitamin daily for ages>50. He is not on any particular B vitamin supplements otherwise.     He has special shoes with additional heel support and finds these helpful. His neuropathy symptoms bother him most when waking barefoot on hard surfaces.     CURRENT MEDICATIONS:     Current Outpatient Medications on File Prior to Visit:  ASPIRIN NOT PRESCRIBED (INTENTIONAL) Antiplatelet medication not prescribed intentionally due to Current anticoagulant therapy (warfarin/enoxaparin)   blood glucose (CONTOUR NEXT TEST) test strip Use to test blood sugar 2 times daily or as directed.   glipiZIDE (GLUCOTROL XL) 5 MG 24 hr tablet Take 1 tablet (5 mg) by mouth daily   insulin degludec (TRESIBA) 200 UNIT/ML pen Take 22 units at bedtime (increase by 2 units every 4 days until am readings are under 120) max dose: 32.   insulin pen needle (BD LUIS U/F) 32G X 4 MM miscellaneous Use 1 daily as directed.   lisinopril (PRINIVIL/ZESTRIL) 5 MG tablet TAKE 1 TABLET BY MOUTH ONCE DAILY   metoprolol succinate (TOPROL-XL) 50 MG 24 hr tablet Take 1.5 tablets (75 mg) by mouth 2 times daily   multivitamin, therapeutic with minerals (THERA-VIT-M) TABS Take 1 tablet by mouth daily.   tamsulosin (FLOMAX) 0.4 MG capsule TAKE ONE CAPSULE BY MOUTH ONCE DAILY   warfarin (COUMADIN) 2.5 MG tablet TAKE 1 TABLET BY MOUTH ONCE DAILY OR  AS  DIRECTED  BY  ACC     No current facility-administered medications on file prior to visit.     RECENT DIAGNOSTIC STUDIES:   Labs:   Results for orders placed or performed in visit on 03/21/19   INR point of care   Result Value Ref Range    INR Protime 2.5 (A) 0.86 - 1.14         REVIEW OF SYSTEMS:                                                      10-point review of systems is negative except as mentioned above in HPI.     EXAM:                                                      Physical Exam:   Vitals: /66 (BP Location: Right arm, Patient Position: Chair, Cuff Size: Adult Regular)   " Pulse 76   Temp 97.4  F (36.3  C) (Oral)   Resp 16   Ht 1.778 m (5' 10\")   Wt 89 kg (196 lb 3.2 oz)   SpO2 97%   BMI 28.15 kg/m     BMI= Body mass index is 28.15 kg/m .  GENERAL: NAD.   HEENT: NC/AT. Multiple scars along the anterior neck.  CV: RRR. S1 and S2.  Focused Neurologic:  MENTAL STATUS: Alert, attentive. Speech is fluent, with normal naming repetition comprehension.  Normal  concentration. Adequate fund of knowledge.   CRANIAL NERVES: Facial movement normal. EOM full. Hearing intact to conversation. Trapezius strength intact.   MOTOR: 5/5 in proximal and distal muscle groups of lower extremities. Tone and bulk normal.   SENSATION: Pinprick in hands is normal. Cannot feel pinprick distal to mid-calf. Poor proprioception at great toes. Vibration: Absent at both ankles.   COORDINATION: Normal fine motor movements of the hands.  STATION AND GAIT: Slight sway with Romberg. Casual gait is antalgic.  EXTR: Feet are flat.   Right hand-dominant.     ASSESSMENT and PLAN:                                                      Assessment and Plan:    ICD-10-CM    1. Peripheral polyneuropathy G62.9 gabapentin (NEURONTIN) 600 MG tablet     gabapentin (NEURONTIN) 300 MG capsule     Protein immunofixation urine     Vitamin B1 whole blood     Vitamin B6      Mr. Russo is a pleasant 74 yo man with multiple medical comorbidities including diabetes and prior heavy alcohol use here for follow-up regarding lower extremity neuropathy. We discussed the lab work-up results from after his previous visit. I would like to check a few again, but as mentioned before: there are two likely explanations for his neuropathy: the alcohol history and the diabetes. We again discussed the importance of tight glucose control. He is now on insulin. He continues to abstain from alcohol. I think he would benefit from a B12 supplement. Otherwise, we will continue with the current dose of Gabapentin for symptomatic management. I would like " to see Mr. Russo back in about 6 months he understands and agrees with the plan.     Patient Instructions:  Continue the Gabapentin: 600mg/600mg/900mg. Updated prescription is in.  I would like to recheck some of your labs when you come in for your next draw (ok to wait until your June appointment with Dr. Gross). We will notify you of the results. Labs are immunofixation (serum and urine), B6 and B1.  Start a daily B12 supplement: 1000mcg.   Return to neurology in 6 months. Let me know if any concerns arise in the meantime.     Total Time: 25 minutes were spent with the patient. More than 50% of the time spent on counseling (as described above in Assessment and Plan) /coordinating the care.    Anais Justice MD  Neurology                    Again, thank you for allowing me to participate in the care of your patient.        Sincerely,        Anais Justice MD

## 2019-04-15 NOTE — PROGRESS NOTES
ESTABLISHED PATIENT NEUROLOGY NOTE    DATE OF VISIT: 4/15/2019  MRN: [id number]  PATIENT NAME: Antoine Russo  YOB: 1945    Chief Complaint   Patient presents with     RECHECK     peripherial neuropathy     SUBJECTIVE:                                                      HISTORY OF PRESENT ILLNESS:  Antoine is here for follow up regarding peripheral neuropathy. He has history of diabetes and prior moderate/heavy alcohol use. 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 We did additional labs for neuropathy after his previous visit, despite the known risk factors. He had stopped drinking alcohol and working on tight glucose control at my recommendation (though his glucose was 370). His B1 and B6 were a little high, so I recommended he cut back to QOD vitamin supplement. I wanted him to add B12, though as this was low-normal at 383. He had mildly elevated IgG so I recommended we recheck this in serum and urine. I do not see that the retesting was done. He is on gabapentin for symptomatic management. He reported slight worsening of the foot pain, so I recommended he increase the dose to 600mg/600mg/900mg.     Today the patient says that the feet are the same. He has pain that reaches to the heels. He says they also feel numb. No weakness or problems with balance. He does not notice similar symptoms in the hands.     He is now on insulin and glipizide and says his glucose has improved. It tends to be lower in the afternoon than morning.     Things are going well in terms of the gabapentin. He would like to get more of the 600mg tablets to make it easier to continue the 600mg/600mg/900mg dosing. Otherwise no concerns about the medication. He takes a multivitamin daily for ages>50. He is not on any particular B vitamin supplements otherwise.     He has special shoes with additional heel support and finds these helpful. His neuropathy symptoms bother him most when waking barefoot  "on hard surfaces.     CURRENT MEDICATIONS:     Current Outpatient Medications on File Prior to Visit:  ASPIRIN NOT PRESCRIBED (INTENTIONAL) Antiplatelet medication not prescribed intentionally due to Current anticoagulant therapy (warfarin/enoxaparin)   blood glucose (CONTOUR NEXT TEST) test strip Use to test blood sugar 2 times daily or as directed.   glipiZIDE (GLUCOTROL XL) 5 MG 24 hr tablet Take 1 tablet (5 mg) by mouth daily   insulin degludec (TRESIBA) 200 UNIT/ML pen Take 22 units at bedtime (increase by 2 units every 4 days until am readings are under 120) max dose: 32.   insulin pen needle (BD LUIS U/F) 32G X 4 MM miscellaneous Use 1 daily as directed.   lisinopril (PRINIVIL/ZESTRIL) 5 MG tablet TAKE 1 TABLET BY MOUTH ONCE DAILY   metoprolol succinate (TOPROL-XL) 50 MG 24 hr tablet Take 1.5 tablets (75 mg) by mouth 2 times daily   multivitamin, therapeutic with minerals (THERA-VIT-M) TABS Take 1 tablet by mouth daily.   tamsulosin (FLOMAX) 0.4 MG capsule TAKE ONE CAPSULE BY MOUTH ONCE DAILY   warfarin (COUMADIN) 2.5 MG tablet TAKE 1 TABLET BY MOUTH ONCE DAILY OR  AS  DIRECTED  BY  ACC     No current facility-administered medications on file prior to visit.     RECENT DIAGNOSTIC STUDIES:   Labs:   Results for orders placed or performed in visit on 03/21/19   INR point of care   Result Value Ref Range    INR Protime 2.5 (A) 0.86 - 1.14         REVIEW OF SYSTEMS:                                                      10-point review of systems is negative except as mentioned above in HPI.     EXAM:                                                      Physical Exam:   Vitals: /66 (BP Location: Right arm, Patient Position: Chair, Cuff Size: Adult Regular)   Pulse 76   Temp 97.4  F (36.3  C) (Oral)   Resp 16   Ht 1.778 m (5' 10\")   Wt 89 kg (196 lb 3.2 oz)   SpO2 97%   BMI 28.15 kg/m    BMI= Body mass index is 28.15 kg/m .  GENERAL: NAD.   HEENT: NC/AT. Multiple scars along the anterior neck.  CV: RRR. " S1 and S2.  Focused Neurologic:  MENTAL STATUS: Alert, attentive. Speech is fluent, with normal naming repetition comprehension.  Normal  concentration. Adequate fund of knowledge.   CRANIAL NERVES: Facial movement normal. EOM full. Hearing intact to conversation. Trapezius strength intact.   MOTOR: 5/5 in proximal and distal muscle groups of lower extremities. Tone and bulk normal.   SENSATION: Pinprick in hands is normal. Cannot feel pinprick distal to mid-calf. Poor proprioception at great toes. Vibration: Absent at both ankles.   COORDINATION: Normal fine motor movements of the hands.  STATION AND GAIT: Slight sway with Romberg. Casual gait is antalgic.  EXTR: Feet are flat.   Right hand-dominant.     ASSESSMENT and PLAN:                                                      Assessment and Plan:    ICD-10-CM    1. Peripheral polyneuropathy G62.9 gabapentin (NEURONTIN) 600 MG tablet     gabapentin (NEURONTIN) 300 MG capsule     Protein immunofixation urine     Vitamin B1 whole blood     Vitamin B6      Mr. Russo is a pleasant 74 yo man with multiple medical comorbidities including diabetes and prior heavy alcohol use here for follow-up regarding lower extremity neuropathy. We discussed the lab work-up results from after his previous visit. I would like to check a few again, but as mentioned before: there are two likely explanations for his neuropathy: the alcohol history and the diabetes. We again discussed the importance of tight glucose control. He is now on insulin. He continues to abstain from alcohol. I think he would benefit from a B12 supplement. Otherwise, we will continue with the current dose of Gabapentin for symptomatic management. I would like to see Mr. Russo back in about 6 months he understands and agrees with the plan.     Patient Instructions:  Continue the Gabapentin: 600mg/600mg/900mg. Updated prescription is in.  I would like to recheck some of your labs when you come in for your next  draw (ok to wait until your June appointment with Dr. Gross). We will notify you of the results. Labs are immunofixation (serum and urine), B6 and B1.  Start a daily B12 supplement: 1000mcg.   Return to neurology in 6 months. Let me know if any concerns arise in the meantime.     Total Time: 25 minutes were spent with the patient. More than 50% of the time spent on counseling (as described above in Assessment and Plan) /coordinating the care.    Anais Justice MD  Neurology

## 2019-04-15 NOTE — PATIENT INSTRUCTIONS
Plan:    Continue the Gabapentin: 600mg/600mg/900mg. Updated prescription is in.  I would like to recheck some of your labs when you come in for your next draw (ok to wait until your June appointment with Dr. Gross). We will notify you of the results. Labs are immunofixation (serum and urine), B6 and B1.  Start a daily B12 supplement: 1000mcg.   Return to neurology in 6 months. Let me know if any concerns arise in the meantime.

## 2019-04-16 ENCOUNTER — HOSPITAL ENCOUNTER (INPATIENT)
Facility: CLINIC | Age: 74
LOS: 3 days | Discharge: HOME | End: 2019-04-19
Attending: EMERGENCY MEDICINE | Admitting: FAMILY MEDICINE
Payer: COMMERCIAL

## 2019-04-16 DIAGNOSIS — R82.71 BACTERIURIA WITH PYURIA: Primary | ICD-10-CM

## 2019-04-16 DIAGNOSIS — N39.0 URINARY TRACT INFECTION WITHOUT HEMATURIA, SITE UNSPECIFIED: ICD-10-CM

## 2019-04-16 DIAGNOSIS — K85.90 ACUTE PANCREATITIS, UNSPECIFIED COMPLICATION STATUS, UNSPECIFIED PANCREATITIS TYPE: ICD-10-CM

## 2019-04-16 DIAGNOSIS — R82.81 BACTERIURIA WITH PYURIA: Primary | ICD-10-CM

## 2019-04-16 LAB
ALBUMIN SERPL-MCNC: 3.3 G/DL (ref 3.4–5)
ALBUMIN UR-MCNC: NEGATIVE MG/DL
ALP SERPL-CCNC: 59 U/L (ref 40–150)
ALT SERPL W P-5'-P-CCNC: 28 U/L (ref 0–70)
ANION GAP SERPL CALCULATED.3IONS-SCNC: 5 MMOL/L (ref 3–14)
APPEARANCE UR: ABNORMAL
AST SERPL W P-5'-P-CCNC: 19 U/L (ref 0–45)
BACTERIA #/AREA URNS HPF: ABNORMAL /HPF
BASOPHILS # BLD AUTO: 0.1 10E9/L (ref 0–0.2)
BASOPHILS NFR BLD AUTO: 0.3 %
BILIRUB SERPL-MCNC: 0.6 MG/DL (ref 0.2–1.3)
BILIRUB UR QL STRIP: NEGATIVE
BUN SERPL-MCNC: 18 MG/DL (ref 7–30)
CALCIUM SERPL-MCNC: 9.4 MG/DL (ref 8.5–10.1)
CHLORIDE SERPL-SCNC: 101 MMOL/L (ref 94–109)
CO2 SERPL-SCNC: 29 MMOL/L (ref 20–32)
COLOR UR AUTO: YELLOW
CREAT SERPL-MCNC: 1.24 MG/DL (ref 0.66–1.25)
DIFFERENTIAL METHOD BLD: ABNORMAL
EOSINOPHIL # BLD AUTO: 0.1 10E9/L (ref 0–0.7)
EOSINOPHIL NFR BLD AUTO: 0.7 %
ERYTHROCYTE [DISTWIDTH] IN BLOOD BY AUTOMATED COUNT: 14.8 % (ref 10–15)
GFR SERPL CREATININE-BSD FRML MDRD: 58 ML/MIN/{1.73_M2}
GLUCOSE SERPL-MCNC: 194 MG/DL (ref 70–99)
GLUCOSE UR STRIP-MCNC: NEGATIVE MG/DL
HCT VFR BLD AUTO: 46.5 % (ref 40–53)
HGB BLD-MCNC: 14.4 G/DL (ref 13.3–17.7)
HGB UR QL STRIP: ABNORMAL
IMM GRANULOCYTES # BLD: 0.1 10E9/L (ref 0–0.4)
IMM GRANULOCYTES NFR BLD: 0.6 %
KETONES UR STRIP-MCNC: NEGATIVE MG/DL
LEUKOCYTE ESTERASE UR QL STRIP: ABNORMAL
LIPASE SERPL-CCNC: 853 U/L (ref 73–393)
LYMPHOCYTES # BLD AUTO: 2.4 10E9/L (ref 0.8–5.3)
LYMPHOCYTES NFR BLD AUTO: 13.8 %
MCH RBC QN AUTO: 29.3 PG (ref 26.5–33)
MCHC RBC AUTO-ENTMCNC: 31 G/DL (ref 31.5–36.5)
MCV RBC AUTO: 95 FL (ref 78–100)
MONOCYTES # BLD AUTO: 0.9 10E9/L (ref 0–1.3)
MONOCYTES NFR BLD AUTO: 5 %
MUCOUS THREADS #/AREA URNS LPF: PRESENT /LPF
NEUTROPHILS # BLD AUTO: 14 10E9/L (ref 1.6–8.3)
NEUTROPHILS NFR BLD AUTO: 79.6 %
NITRATE UR QL: POSITIVE
NRBC # BLD AUTO: 0 10*3/UL
NRBC BLD AUTO-RTO: 0 /100
PH UR STRIP: 6 PH (ref 5–7)
PLATELET # BLD AUTO: 358 10E9/L (ref 150–450)
POTASSIUM SERPL-SCNC: 4.4 MMOL/L (ref 3.4–5.3)
PROT SERPL-MCNC: 7.7 G/DL (ref 6.8–8.8)
RBC # BLD AUTO: 4.91 10E12/L (ref 4.4–5.9)
RBC #/AREA URNS AUTO: 10 /HPF (ref 0–2)
SODIUM SERPL-SCNC: 135 MMOL/L (ref 133–144)
SOURCE: ABNORMAL
SP GR UR STRIP: 1.01 (ref 1–1.03)
UROBILINOGEN UR STRIP-MCNC: 0 MG/DL (ref 0–2)
WBC # BLD AUTO: 17.6 10E9/L (ref 4–11)
WBC #/AREA URNS AUTO: >182 /HPF (ref 0–5)
WBC CLUMPS #/AREA URNS HPF: PRESENT /HPF

## 2019-04-16 PROCEDURE — 87088 URINE BACTERIA CULTURE: CPT | Performed by: EMERGENCY MEDICINE

## 2019-04-16 PROCEDURE — 80053 COMPREHEN METABOLIC PANEL: CPT | Performed by: EMERGENCY MEDICINE

## 2019-04-16 PROCEDURE — 99285 EMERGENCY DEPT VISIT HI MDM: CPT | Mod: Z6 | Performed by: EMERGENCY MEDICINE

## 2019-04-16 PROCEDURE — 25800030 ZZH RX IP 258 OP 636: Performed by: EMERGENCY MEDICINE

## 2019-04-16 PROCEDURE — 81001 URINALYSIS AUTO W/SCOPE: CPT | Performed by: EMERGENCY MEDICINE

## 2019-04-16 PROCEDURE — 96360 HYDRATION IV INFUSION INIT: CPT | Performed by: EMERGENCY MEDICINE

## 2019-04-16 PROCEDURE — 83036 HEMOGLOBIN GLYCOSYLATED A1C: CPT | Performed by: FAMILY MEDICINE

## 2019-04-16 PROCEDURE — 96361 HYDRATE IV INFUSION ADD-ON: CPT | Performed by: EMERGENCY MEDICINE

## 2019-04-16 PROCEDURE — 99285 EMERGENCY DEPT VISIT HI MDM: CPT | Mod: 25 | Performed by: EMERGENCY MEDICINE

## 2019-04-16 PROCEDURE — 12000000 ZZH R&B MED SURG/OB

## 2019-04-16 PROCEDURE — 87086 URINE CULTURE/COLONY COUNT: CPT | Performed by: EMERGENCY MEDICINE

## 2019-04-16 PROCEDURE — 85025 COMPLETE CBC W/AUTO DIFF WBC: CPT | Performed by: EMERGENCY MEDICINE

## 2019-04-16 PROCEDURE — 83690 ASSAY OF LIPASE: CPT | Performed by: EMERGENCY MEDICINE

## 2019-04-16 PROCEDURE — 87186 SC STD MICRODIL/AGAR DIL: CPT | Performed by: EMERGENCY MEDICINE

## 2019-04-16 RX ADMIN — SODIUM CHLORIDE, POTASSIUM CHLORIDE, SODIUM LACTATE AND CALCIUM CHLORIDE 1000 ML: 600; 310; 30; 20 INJECTION, SOLUTION INTRAVENOUS at 22:30

## 2019-04-17 PROBLEM — K85.91 NECROTIZING PANCREATITIS: Status: RESOLVED | Noted: 2018-04-17 | Resolved: 2019-04-17

## 2019-04-17 PROBLEM — N17.9 ACUTE KIDNEY INJURY (H): Status: ACTIVE | Noted: 2019-04-17

## 2019-04-17 PROBLEM — R82.81 BACTERIURIA WITH PYURIA: Status: ACTIVE | Noted: 2019-04-17

## 2019-04-17 PROBLEM — K85.20 ALCOHOL-INDUCED ACUTE PANCREATITIS WITHOUT INFECTION OR NECROSIS: Status: RESOLVED | Noted: 2018-01-11 | Resolved: 2019-04-17

## 2019-04-17 PROBLEM — R82.71 BACTERIURIA WITH PYURIA: Status: ACTIVE | Noted: 2019-04-17

## 2019-04-17 PROBLEM — D72.823 LEUKEMOID REACTION: Status: ACTIVE | Noted: 2019-04-17

## 2019-04-17 PROBLEM — K85.90 PANCREATITIS: Status: RESOLVED | Noted: 2018-01-23 | Resolved: 2019-04-17

## 2019-04-17 PROBLEM — K86.1 ACUTE ON CHRONIC PANCREATITIS (H): Status: ACTIVE | Noted: 2018-01-23

## 2019-04-17 PROBLEM — K85.20 ALCOHOL-INDUCED ACUTE PANCREATITIS WITHOUT INFECTION OR NECROSIS: Chronic | Status: RESOLVED | Noted: 2018-01-11 | Resolved: 2019-04-17

## 2019-04-17 LAB
GLUCOSE BLDC GLUCOMTR-MCNC: 156 MG/DL (ref 70–99)
GLUCOSE BLDC GLUCOMTR-MCNC: 182 MG/DL (ref 70–99)
GLUCOSE BLDC GLUCOMTR-MCNC: 183 MG/DL (ref 70–99)
GLUCOSE BLDC GLUCOMTR-MCNC: 203 MG/DL (ref 70–99)
GLUCOSE BLDC GLUCOMTR-MCNC: 207 MG/DL (ref 70–99)
HBA1C MFR BLD: 11.6 % (ref 0–5.6)
INR PPP: 2.59 (ref 0.86–1.14)

## 2019-04-17 PROCEDURE — 25000132 ZZH RX MED GY IP 250 OP 250 PS 637

## 2019-04-17 PROCEDURE — 25000131 ZZH RX MED GY IP 250 OP 636 PS 637: Performed by: FAMILY MEDICINE

## 2019-04-17 PROCEDURE — 00000146 ZZHCL STATISTIC GLUCOSE BY METER IP

## 2019-04-17 PROCEDURE — 25800030 ZZH RX IP 258 OP 636

## 2019-04-17 PROCEDURE — 99223 1ST HOSP IP/OBS HIGH 75: CPT | Mod: AI | Performed by: FAMILY MEDICINE

## 2019-04-17 PROCEDURE — 25000132 ZZH RX MED GY IP 250 OP 250 PS 637: Performed by: FAMILY MEDICINE

## 2019-04-17 PROCEDURE — 85610 PROTHROMBIN TIME: CPT | Performed by: FAMILY MEDICINE

## 2019-04-17 PROCEDURE — 12000000 ZZH R&B MED SURG/OB

## 2019-04-17 PROCEDURE — 25800030 ZZH RX IP 258 OP 636: Performed by: EMERGENCY MEDICINE

## 2019-04-17 PROCEDURE — 25000128 H RX IP 250 OP 636: Performed by: FAMILY MEDICINE

## 2019-04-17 PROCEDURE — 36415 COLL VENOUS BLD VENIPUNCTURE: CPT | Performed by: FAMILY MEDICINE

## 2019-04-17 RX ORDER — ONDANSETRON 2 MG/ML
4 INJECTION INTRAMUSCULAR; INTRAVENOUS EVERY 6 HOURS PRN
Status: DISCONTINUED | OUTPATIENT
Start: 2019-04-17 | End: 2019-04-19 | Stop reason: HOSPADM

## 2019-04-17 RX ORDER — HYDROMORPHONE HYDROCHLORIDE 1 MG/ML
.3-.5 INJECTION, SOLUTION INTRAMUSCULAR; INTRAVENOUS; SUBCUTANEOUS
Status: DISCONTINUED | OUTPATIENT
Start: 2019-04-17 | End: 2019-04-18

## 2019-04-17 RX ORDER — POTASSIUM CHLORIDE 7.45 MG/ML
10 INJECTION INTRAVENOUS
Status: DISCONTINUED | OUTPATIENT
Start: 2019-04-17 | End: 2019-04-19 | Stop reason: HOSPADM

## 2019-04-17 RX ORDER — OXYCODONE HYDROCHLORIDE 5 MG/1
5-10 TABLET ORAL
Status: DISCONTINUED | OUTPATIENT
Start: 2019-04-17 | End: 2019-04-18

## 2019-04-17 RX ORDER — NALOXONE HYDROCHLORIDE 0.4 MG/ML
.1-.4 INJECTION, SOLUTION INTRAMUSCULAR; INTRAVENOUS; SUBCUTANEOUS
Status: DISCONTINUED | OUTPATIENT
Start: 2019-04-17 | End: 2019-04-19 | Stop reason: HOSPADM

## 2019-04-17 RX ORDER — PROCHLORPERAZINE 25 MG
12.5 SUPPOSITORY, RECTAL RECTAL EVERY 12 HOURS PRN
Status: DISCONTINUED | OUTPATIENT
Start: 2019-04-17 | End: 2019-04-19 | Stop reason: HOSPADM

## 2019-04-17 RX ORDER — POTASSIUM CHLORIDE 1500 MG/1
20-40 TABLET, EXTENDED RELEASE ORAL
Status: DISCONTINUED | OUTPATIENT
Start: 2019-04-17 | End: 2019-04-19 | Stop reason: HOSPADM

## 2019-04-17 RX ORDER — ONDANSETRON 4 MG/1
4 TABLET, ORALLY DISINTEGRATING ORAL EVERY 6 HOURS PRN
Status: DISCONTINUED | OUTPATIENT
Start: 2019-04-17 | End: 2019-04-17

## 2019-04-17 RX ORDER — POTASSIUM CHLORIDE 1.5 G/1.58G
20-40 POWDER, FOR SOLUTION ORAL
Status: DISCONTINUED | OUTPATIENT
Start: 2019-04-17 | End: 2019-04-19 | Stop reason: HOSPADM

## 2019-04-17 RX ORDER — PROCHLORPERAZINE MALEATE 5 MG
5 TABLET ORAL EVERY 6 HOURS PRN
Status: DISCONTINUED | OUTPATIENT
Start: 2019-04-17 | End: 2019-04-19 | Stop reason: HOSPADM

## 2019-04-17 RX ORDER — LISINOPRIL 5 MG/1
5 TABLET ORAL DAILY
Status: DISCONTINUED | OUTPATIENT
Start: 2019-04-17 | End: 2019-04-19 | Stop reason: HOSPADM

## 2019-04-17 RX ORDER — SODIUM CHLORIDE, SODIUM LACTATE, POTASSIUM CHLORIDE, CALCIUM CHLORIDE 600; 310; 30; 20 MG/100ML; MG/100ML; MG/100ML; MG/100ML
INJECTION, SOLUTION INTRAVENOUS CONTINUOUS
Status: DISCONTINUED | OUTPATIENT
Start: 2019-04-17 | End: 2019-04-18

## 2019-04-17 RX ORDER — ACETAMINOPHEN 325 MG/1
650 TABLET ORAL EVERY 4 HOURS PRN
Status: DISCONTINUED | OUTPATIENT
Start: 2019-04-17 | End: 2019-04-19 | Stop reason: HOSPADM

## 2019-04-17 RX ORDER — POTASSIUM CL/LIDO/0.9 % NACL 10MEQ/0.1L
10 INTRAVENOUS SOLUTION, PIGGYBACK (ML) INTRAVENOUS
Status: DISCONTINUED | OUTPATIENT
Start: 2019-04-17 | End: 2019-04-19 | Stop reason: HOSPADM

## 2019-04-17 RX ORDER — METOCLOPRAMIDE 5 MG/1
5 TABLET ORAL EVERY 6 HOURS PRN
Status: DISCONTINUED | OUTPATIENT
Start: 2019-04-17 | End: 2019-04-19 | Stop reason: HOSPADM

## 2019-04-17 RX ORDER — DEXTROSE MONOHYDRATE 25 G/50ML
25-50 INJECTION, SOLUTION INTRAVENOUS
Status: DISCONTINUED | OUTPATIENT
Start: 2019-04-17 | End: 2019-04-19 | Stop reason: HOSPADM

## 2019-04-17 RX ORDER — METOCLOPRAMIDE HYDROCHLORIDE 5 MG/ML
5 INJECTION INTRAMUSCULAR; INTRAVENOUS EVERY 6 HOURS PRN
Status: DISCONTINUED | OUTPATIENT
Start: 2019-04-17 | End: 2019-04-19 | Stop reason: HOSPADM

## 2019-04-17 RX ORDER — GABAPENTIN 300 MG/1
300 CAPSULE ORAL AT BEDTIME
Status: DISCONTINUED | OUTPATIENT
Start: 2019-04-17 | End: 2019-04-19 | Stop reason: HOSPADM

## 2019-04-17 RX ORDER — CEFTRIAXONE SODIUM 1 G/50ML
1 INJECTION, SOLUTION INTRAVENOUS EVERY 24 HOURS
Status: DISCONTINUED | OUTPATIENT
Start: 2019-04-17 | End: 2019-04-18

## 2019-04-17 RX ORDER — ONDANSETRON 4 MG/1
4 TABLET, ORALLY DISINTEGRATING ORAL EVERY 6 HOURS PRN
Status: DISCONTINUED | OUTPATIENT
Start: 2019-04-17 | End: 2019-04-19 | Stop reason: HOSPADM

## 2019-04-17 RX ORDER — GABAPENTIN 600 MG/1
600 TABLET ORAL 3 TIMES DAILY
Status: DISCONTINUED | OUTPATIENT
Start: 2019-04-17 | End: 2019-04-19 | Stop reason: HOSPADM

## 2019-04-17 RX ORDER — ONDANSETRON 2 MG/ML
4 INJECTION INTRAMUSCULAR; INTRAVENOUS EVERY 6 HOURS PRN
Status: DISCONTINUED | OUTPATIENT
Start: 2019-04-17 | End: 2019-04-17

## 2019-04-17 RX ORDER — POTASSIUM CHLORIDE 29.8 MG/ML
20 INJECTION INTRAVENOUS
Status: DISCONTINUED | OUTPATIENT
Start: 2019-04-17 | End: 2019-04-17

## 2019-04-17 RX ORDER — TAMSULOSIN HYDROCHLORIDE 0.4 MG/1
0.4 CAPSULE ORAL DAILY
Status: DISCONTINUED | OUTPATIENT
Start: 2019-04-17 | End: 2019-04-19 | Stop reason: HOSPADM

## 2019-04-17 RX ORDER — NICOTINE POLACRILEX 4 MG
15-30 LOZENGE BUCCAL
Status: DISCONTINUED | OUTPATIENT
Start: 2019-04-17 | End: 2019-04-19 | Stop reason: HOSPADM

## 2019-04-17 RX ORDER — WARFARIN SODIUM 2.5 MG/1
2.5 TABLET ORAL
Status: COMPLETED | OUTPATIENT
Start: 2019-04-17 | End: 2019-04-17

## 2019-04-17 RX ADMIN — INSULIN ASPART 3 UNITS: 100 INJECTION, SOLUTION INTRAVENOUS; SUBCUTANEOUS at 08:02

## 2019-04-17 RX ADMIN — OXYCODONE HYDROCHLORIDE 5 MG: 5 TABLET ORAL at 12:04

## 2019-04-17 RX ADMIN — METOPROLOL SUCCINATE 75 MG: 50 TABLET, EXTENDED RELEASE ORAL at 20:24

## 2019-04-17 RX ADMIN — GABAPENTIN 600 MG: 600 TABLET, FILM COATED ORAL at 14:17

## 2019-04-17 RX ADMIN — SODIUM CHLORIDE, POTASSIUM CHLORIDE, SODIUM LACTATE AND CALCIUM CHLORIDE: 600; 310; 30; 20 INJECTION, SOLUTION INTRAVENOUS at 07:56

## 2019-04-17 RX ADMIN — INSULIN ASPART 2 UNITS: 100 INJECTION, SOLUTION INTRAVENOUS; SUBCUTANEOUS at 11:56

## 2019-04-17 RX ADMIN — OXYCODONE HYDROCHLORIDE 5 MG: 5 TABLET ORAL at 09:13

## 2019-04-17 RX ADMIN — INSULIN ASPART 1 UNITS: 100 INJECTION, SOLUTION INTRAVENOUS; SUBCUTANEOUS at 17:25

## 2019-04-17 RX ADMIN — LISINOPRIL 5 MG: 5 TABLET ORAL at 07:59

## 2019-04-17 RX ADMIN — SODIUM CHLORIDE, POTASSIUM CHLORIDE, SODIUM LACTATE AND CALCIUM CHLORIDE: 600; 310; 30; 20 INJECTION, SOLUTION INTRAVENOUS at 12:59

## 2019-04-17 RX ADMIN — CEFTRIAXONE SODIUM 1 G: 1 INJECTION, SOLUTION INTRAVENOUS at 02:13

## 2019-04-17 RX ADMIN — METOPROLOL SUCCINATE 75 MG: 50 TABLET, EXTENDED RELEASE ORAL at 07:59

## 2019-04-17 RX ADMIN — TAMSULOSIN HYDROCHLORIDE 0.4 MG: 0.4 CAPSULE ORAL at 07:59

## 2019-04-17 RX ADMIN — SODIUM CHLORIDE, POTASSIUM CHLORIDE, SODIUM LACTATE AND CALCIUM CHLORIDE: 600; 310; 30; 20 INJECTION, SOLUTION INTRAVENOUS at 02:17

## 2019-04-17 RX ADMIN — INSULIN DEGLUDEC INJECTION 22 UNITS: 100 INJECTION, SOLUTION SUBCUTANEOUS at 21:52

## 2019-04-17 RX ADMIN — GABAPENTIN 600 MG: 600 TABLET, FILM COATED ORAL at 07:59

## 2019-04-17 RX ADMIN — WARFARIN SODIUM 2.5 MG: 2.5 TABLET ORAL at 17:24

## 2019-04-17 RX ADMIN — ONDANSETRON 4 MG: 2 INJECTION INTRAMUSCULAR; INTRAVENOUS at 06:04

## 2019-04-17 RX ADMIN — GABAPENTIN 300 MG: 300 CAPSULE ORAL at 21:54

## 2019-04-17 RX ADMIN — SODIUM CHLORIDE, POTASSIUM CHLORIDE, SODIUM LACTATE AND CALCIUM CHLORIDE: 600; 310; 30; 20 INJECTION, SOLUTION INTRAVENOUS at 21:44

## 2019-04-17 RX ADMIN — OXYCODONE HYDROCHLORIDE 5 MG: 5 TABLET ORAL at 05:00

## 2019-04-17 RX ADMIN — ONDANSETRON 4 MG: 2 INJECTION INTRAMUSCULAR; INTRAVENOUS at 14:54

## 2019-04-17 RX ADMIN — GABAPENTIN 600 MG: 600 TABLET, FILM COATED ORAL at 21:54

## 2019-04-17 RX ADMIN — ACETAMINOPHEN 650 MG: 325 TABLET, FILM COATED ORAL at 09:13

## 2019-04-17 ASSESSMENT — ACTIVITIES OF DAILY LIVING (ADL)
ADLS_ACUITY_SCORE: 11
RETIRED_EATING: 0-->INDEPENDENT
ADLS_ACUITY_SCORE: 11
BATHING: 0-->INDEPENDENT
ADLS_ACUITY_SCORE: 11
RETIRED_COMMUNICATION: 0-->UNDERSTANDS/COMMUNICATES WITHOUT DIFFICULTY
AMBULATION: 0-->INDEPENDENT
TRANSFERRING: 0-->INDEPENDENT
FALL_HISTORY_WITHIN_LAST_SIX_MONTHS: NO
DRESS: 0-->INDEPENDENT
TOILETING: 0-->INDEPENDENT
SWALLOWING: 0-->SWALLOWS FOODS/LIQUIDS WITHOUT DIFFICULTY
COGNITION: 0 - NO COGNITION ISSUES REPORTED

## 2019-04-17 NOTE — ED PROVIDER NOTES
History     Chief Complaint   Patient presents with     Abdominal Pain     HPI  Antoine Russo is a 73 year old male who presents with epigastric pain described as sharp, nonradiating, associated nausea without vomiting.  He has had this pain many times in the past history of chronic recurrent pancreatitis.  Last alcohol intake reported beer at Thanksgiving none recently.  Denies illicit drug use.  Ex-smoker.  Denies fever.  He has history of colectomy secondary to colitis, describes bowel movements is chronically loose without black or bloody component.  Continues to make urine without complaint.  Last food intake was ribs and sauerkraut 2 hours ago.    Allergies:  Allergies   Allergen Reactions     Nkda [No Known Drug Allergies]        Problem List:    Patient Active Problem List    Diagnosis Date Noted     Anticipated difficulty with intubation 05/23/2017     Priority: High     Class: Chronic     Difficult two hands mask ventilation, intubated multiple times asleep with video laryngoscope. H/o tongue cancer surgery.        Pancreatitis, recurrent (H) 11/24/2018     Priority: Medium     Acute pancreatitis 10/21/2018     Priority: Medium     Necrotizing pancreatitis 04/17/2018     Priority: Medium     Long term current use of anticoagulant therapy 04/05/2018     Priority: Medium     Recurrent pancreatitis (H) 03/30/2018     Priority: Medium     Pancreatitis 01/23/2018     Priority: Medium     Chronic atrial fibrillation (H) 01/23/2018     Priority: Medium     Alcohol-induced acute pancreatitis without infection or necrosis 01/11/2018     Priority: Medium     Spleen absent 10/10/2017     Priority: Medium     Type 2 diabetes mellitus with diabetic polyneuropathy, without long-term current use of insulin (H) 04/04/2017     Priority: Medium     Left varicocele 04/04/2017     Priority: Medium     Pancreatic duct leak 05/03/2016     Priority: Medium     IPMN (intraductal papillary mucinous neoplasm) 03/10/2016      Priority: Medium     H/O colectomy 08/08/2013     Priority: Medium     Health Care Home 06/14/2013     Priority: Medium     EMERGENCY CARE PLAN  June 14, 2013: No current Care Coordination follow up planned. Please refer if Care Coordination services are needed.    Presenting Problem Signs and Symptoms Treatment Plan   Questions or concerns   during clinic hours   I will call my clinic directly:  [address], [place] [address]  [phone].   Questions or concerns outside clinic hours   I will call the 24 hour nurse line at   [phone] or 246[facility].   Need to schedule an appointment   I will call the 24 hour scheduling team at [phone] or my clinic directly at [phone].   Same day treatment     I will call my clinic first, nurse line if after hours, urgent care and express care if needed.   Clinic care coordination services (regular clinic hours)   I will call a clinic care coordinator directly:     Shelia Emanuel RN [facility]  [phone]    JAY Estes:    [phone]    Or call my clinic at [phone] and ask to speak with care coordination.   Crisis Services: Behavioral or Mental Health  [facility] 24 Hour Phone Line  [phone]    [facility] 24 Hour Crisis Services  [phone]    [facility] (Behavioral Health Providers) Network [phone]    [facility]   [phone]     Emergency treatment -- Immediately    [facility] 911                SIRS (systemic inflammatory response syndrome) (H) 12/21/2012     Priority: Medium     Problem list name updated by automated process. Provider to review       Clostridium difficile enterocolitis 12/18/2012     Priority: Medium     Groin fluid collection 12/15/2012     Priority: Medium     CT 12/12- New (since 5/11) collection measuring at least 2.3 cm in diameter and 6.5 cm in length within the right inguinal canal. 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 Gerson Ravi and aristides at the  in 2012  NAD 2017  CT scan of the neck every 2-3 years.  - Thyroid labs yearly.  - Carotid ultrasound in three to four years to evaluate for stenosis.       Colon polyp 08/26/2011     Priority: Medium     Colonoscopy 8/2011-A sessile polyp was found in the cecum. The polyp was 6 mm in size. The polyp was removed with a hot snare. Resection and retrieval were complete. A sessile polyp was found in the proximal transverse colon. The polyp was 15 mm in size. The polyp was removed with a hot snare. Resection and retrieval were complete. A sessile polyp was found in the sigmoid colon. The polyp was 5 mm in size       Hyperlipidemia LDL goal <100 10/31/2010     Priority: Medium     CAD (coronary artery disease) 05/26/2009     Priority: Medium     Stress testing 2009 showed inferior wall ischemia.  Cath preformed; identified moderate CAD with stenosis of 40-50% in LAD and RCA.  No stents were placed.  Echo in 01/2018 (done while in Afib with rates of 100-110); EF of 55-60%.  No RWMA.       GERD (gastroesophageal reflux disease) 05/26/2009     Priority: Medium     Hypertrophy of breast 09/25/2007     Priority: Medium     Diverticulitis of colon 07/17/2007     Priority: Medium     Colitis on CT Scan 5/2011- [place]  Colonoscopy 8/2011 Diverticulitis - Multiple small and large-mouthed diverticula were found in the mid sigmoid colon and at the hepatic flexure. There was narrowing of the colon in association with the diverticular opening. Nena-diverticular erythema was seen. There was evidence of an impacted diverticulum. Purulent discharge was seen in  association with the diverticlar opening. Biopsies were taken with a cold forceps for histology. Multiple small and large-mouthed diverticula were found in the sigmoid colon, in the descending colon, in the transverse colon and in the ascending colon.   Hospitalized diverticulitis 12/12           PERS HX TOBACCO USE - quit in 11/06 with chantix 03/15/2007     Priority: Medium     Hypertension, benign essential, goal below 140/90 11/07/2005     Priority: Medium     Patient has only fair bp control and with family history of diabetes will all ace if lab indicated also has bph and may op for psa and not digital exam next yr        Pain in joint, shoulder region 11/07/2005     Priority: Medium     Hypertrophy of prostate without urinary obstruction 11/07/2005     Priority: Medium     Problem list name updated by automated process. 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 difficile colitis      Colon polyp      Coronary artery disease      Diabetes mellitus (H)      Diverticulitis      Hypertension      Malignant neoplasm (H)      Noninfectious ileitis      Recurrent pancreatitis (H)        Past Surgical History:    Past Surgical History:   Procedure Laterality Date     BREAST SURGERY  2008    right breast mass benign     COLONOSCOPY      multiple polyps removed     COLONOSCOPY  8/24/2011    Procedure:COMBINED COLONOSCOPY, REMOVE TUMOR/POLYP/LESION BY SNARE; Surgeon:MILEY ARBOLEDA; Location:[facility]     COLONOSCOPY  12/17/2012    Procedure: COLONOSCOPY;;  Surgeon: Leon Maurer MD;  Location:  GI     COLONOSCOPY  12/18/2012    Procedure: COLONOSCOPY;;  Surgeon: Leon Maurer MD;  Location:  GI     DENERVATION OF SPERMATIC CORD MICROSURGICAL Left 5/23/2017    Procedure: DENERVATION OF SPERMATIC CORD MICROSURGICAL;;  Surgeon: Marcio Aggarwal MD;  [facility]     DISSECTION  RADICAL NECK BILATERAL  8/2/2012    Procedure: DISSECTION RADICAL NECK BILATERAL;;  Surgeon: Yung Alvares MD;  [facility]     ENDOSCOPIC RETROGRADE CHOLANGIOPANCREATOGRAM N/A 5/10/2016    Procedure: COMBINED ENDOSCOPIC RETROGRADE CHOLANGIOPANCREATOGRAPHY, PLACE TUBE/STENT;  Surgeon: Yovanny Beasley MD;  [facility]     ENDOSCOPIC RETROGRADE CHOLANGIOPANCREATOGRAM N/A 3/29/2018    Procedure: ENDOSCOPIC RETROGRADE CHOLANGIOPANCREATOGRAM;  Endoscopic Retrograde Cholangiopancreatogram, Endoscopic Ultrasound, Biliary Sphincterotomy, Biliary and Pancreatic Stent Placement;  Surgeon: Yovanny Beasley MD;  [facility]     ENDOSCOPIC ULTRASOUND UPPER GASTROINTESTINAL TRACT (GI) N/A 2/3/2016    Procedure: ENDOSCOPIC ULTRASOUND, ESOPHAGOSCOPY / UPPER GASTROINTESTINAL TRACT (GI);  Surgeon: Grabiel Plata MD;  [facility]     ENDOSCOPIC ULTRASOUND UPPER GASTROINTESTINAL TRACT (GI) N/A 3/29/2018    Procedure: ENDOSCOPIC ULTRASOUND, ESOPHAGOSCOPY / UPPER GASTROINTESTINAL TRACT (GI);;  Surgeon: Grabiel Plata MD;  [facility]     ESOPHAGOSCOPY, GASTROSCOPY, DUODENOSCOPY (EGD), COMBINED N/A 2/3/2016    Procedure: COMBINED ENDOSCOPIC ULTRASOUND, ESOPHAGOSCOPY, GASTROSCOPY, DUODENOSCOPY (EGD), FINE NEEDLE ASPIRATE/BIOPSY;  Surgeon: Grabiel Plata MD;  [facility]     ESOPHAGOSCOPY, GASTROSCOPY, DUODENOSCOPY (EGD), COMBINED N/A 6/8/2016    Procedure: COMBINED ESOPHAGOSCOPY, GASTROSCOPY, DUODENOSCOPY (EGD), REMOVE FOREIGN BODY;  Surgeon: Yovanny Beasley MD;  [facility]     ESOPHAGOSCOPY, GASTROSCOPY, DUODENOSCOPY (EGD), COMBINED N/A 4/17/2018    Procedure: COMBINED ESOPHAGOSCOPY, GASTROSCOPY, DUODENOSCOPY (EGD), REMOVE FOREIGN BODY;  EGD with stent removal;  Surgeon: Grabiel Plata MD;  [facility]     EXCISE LESION INTRAORAL  6/14/2012    Procedure: EXCISE LESION INTRAORAL;  Wide Local Excision Floor of Mouth, Direct Laryngoscopy, Bilateral  Ida's Marsuplization, Split Thickness Skin Graft from right Thigh  Latex Safe;  Surgeon: Gerson Ravi MD;  [facility]     EXCISE LESION INTRAORAL  8/2/2012    Procedure: EXCISE LESION INTRAORAL;  Floor of Mouth Resection, Bilateral Selective Radical Neck Dissection, Tracheostomy, Left Radial Forearm  Free Flap with Alloderm, Nasogastric Feeding Tube Placement,    * Latex Safe*;  Surgeon: Gerson Ravi MD;  [facility]     EXCISE LESION INTRAORAL  12/11/2012    Procedure: EXCISE LESION INTRAORAL;  takedown of oral flap;  Surgeon: Yung Alvares MD;  [facility]     GRAFT FREE VASCULARIZED (LOCATION)  8/2/2012    Procedure: GRAFT FREE VASCULARIZED (LOCATION);;  Surgeon: Yung Alvares MD;  [facility]     GRAFT SKIN SPLIT THICKNESS FROM EXTREMITY  6/14/2012    Procedure: GRAFT SKIN SPLIT THICKNESS FROM EXTREMITY;;  Surgeon: Gerson Ravi MD;  [facility]     LAPAROSCOPIC ILEOSTOMY TAKEDOWN  6/6/2013    Procedure: LAPAROSCOPIC ILEOSTOMY TAKEDOWN;  Laparoscopic Closure of Enterostomy, Guerda's Type with IleoRectal Anastomosis ;  Surgeon: Grabiel Riddle MD;  [facility]     LAPAROTOMY EXPLORATORY  12/20/2012    Procedure: LAPAROTOMY EXPLORATORY;  Exploratory Laparotomy, total abdominal colectomy, ileostomy formation;  Surgeon: Miquel Cannon MD;  [facility]     LARYNGOSCOPY  6/14/2012    Procedure: LARYNGOSCOPY;;  Surgeon: Gerson Ravi MD;  [facility]     ORTHOPEDIC SURGERY      ganglian cyst left ankle     PANCREATECTOMY, SPLENECTOMY N/A 3/10/2016    Procedure: PANCREATECTOMY, SPLENECTOMY;  Surgeon: Nael Abel MD;  [facility]     SHOULDER SURGERY  2006, 2008    2006- right rotator cuff, 2008 bone spur on left. Dr. Hdez     VARICOCELECTOMY Left 5/23/2017    Procedure: VARICOCELECTOMY;  Left Varicocele Repair, Denervation of Left Testis;  Surgeon: Marcio Aggarwal MD;  [facility]       Family History:    Family History   Problem Relation Age of  Onset     Diabetes Sister         onset age 50     Alzheimer Disease Mother          80     Alzheimer Disease Father          85     Diabetes Other         nephew type 1     Diabetes Other      Aneurysm Sister      Anesthesia Reaction No family hx of      Colon Cancer No family hx of      Colon Polyps No family hx of      Crohn's Disease No family hx of      Ulcerative Colitis No family hx of        Social History:  Marital Status:   [2]  Social History     Tobacco Use     Smoking status: Former Smoker     Packs/day: 1.00     Years: 40.00     Pack years: 40.00     Types: Cigarettes     Last attempt to quit: 2006     Years since quittin.4     Smokeless tobacco: Never Used   Substance Use Topics     Alcohol use: Yes     Alcohol/week: 16.8 oz     Types: 28 Cans of beer per week     Drug use: No        Medications:      No current outpatient medications on file.      Review of Systems  All other systems reviewed and are negative.    Physical Exam   BP: 150/75  Pulse: 96  Temp: 97.4  F (36.3  C)  Resp: 18  Weight: 86.2 kg (190 lb)  SpO2: 93 %      Physical Exam  Nontoxic appearing no respiratory distress alert and oriented ×3  Head atraumatic normocephalic  No cervical adenopathy   Neck supple full active painless range of motion  Lungs clear to auscultation  Heart regular no murmur  Abdomen soft moderate epigastric tenderness without guarding or rebound nondistended bowel sounds positive no masses or HSM  Strength and sensation grossly intact throughout the extremities  Speech is fluent, good eye contact, thought processes are rational  Lower extremities without swelling, redness or tenderness  Pedal pulses symmetrical and strong    ED Course        Procedures               Critical Care time:  none               Results for orders placed or performed during the hospital encounter of 19 (from the past 24 hour(s))   CBC with platelets differential   Result Value Ref Range    WBC 17.6 (H)  4.0 - 11.0 10e9/L    RBC Count 4.91 4.4 - 5.9 10e12/L    Hemoglobin 14.4 13.3 - 17.7 g/dL    Hematocrit 46.5 40.0 - 53.0 %    MCV 95 78 - 100 fl    MCH 29.3 26.5 - 33.0 pg    MCHC 31.0 (L) 31.5 - 36.5 g/dL    RDW 14.8 10.0 - 15.0 %    Platelet Count 358 150 - 450 10e9/L    Diff Method Automated Method     % Neutrophils 79.6 %    % Lymphocytes 13.8 %    % Monocytes 5.0 %    % Eosinophils 0.7 %    % Basophils 0.3 %    % Immature Granulocytes 0.6 %    Nucleated RBCs 0 0 /100    Absolute Neutrophil 14.0 (H) 1.6 - 8.3 10e9/L    Absolute Lymphocytes 2.4 0.8 - 5.3 10e9/L    Absolute Monocytes 0.9 0.0 - 1.3 10e9/L    Absolute Eosinophils 0.1 0.0 - 0.7 10e9/L    Absolute Basophils 0.1 0.0 - 0.2 10e9/L    Abs Immature Granulocytes 0.1 0 - 0.4 10e9/L    Absolute Nucleated RBC 0.0    Comprehensive metabolic panel   Result Value Ref Range    Sodium 135 133 - 144 mmol/L    Potassium 4.4 3.4 - 5.3 mmol/L    Chloride 101 94 - 109 mmol/L    Carbon Dioxide 29 20 - 32 mmol/L    Anion Gap 5 3 - 14 mmol/L    Glucose 194 (H) 70 - 99 mg/dL    Urea Nitrogen 18 7 - 30 mg/dL    Creatinine 1.24 0.66 - 1.25 mg/dL    GFR Estimate 58 (L) >60 mL/min/[1.73_m2]    GFR Estimate If Black 67 >60 mL/min/[1.73_m2]    Calcium 9.4 8.5 - 10.1 mg/dL    Bilirubin Total 0.6 0.2 - 1.3 mg/dL    Albumin 3.3 (L) 3.4 - 5.0 g/dL    Protein Total 7.7 6.8 - 8.8 g/dL    Alkaline Phosphatase 59 40 - 150 U/L    ALT 28 0 - 70 U/L    AST 19 0 - 45 U/L   Lipase   Result Value Ref Range    Lipase 853 (H) 73 - 393 U/L   UA reflex to Microscopic   Result Value Ref Range    Color Urine Yellow     Appearance Urine Cloudy     Glucose Urine Negative NEG^Negative mg/dL    Bilirubin Urine Negative NEG^Negative    Ketones Urine Negative NEG^Negative mg/dL    Specific Gravity Urine 1.008 1.003 - 1.035    Blood Urine Small (A) NEG^Negative    pH Urine 6.0 5.0 - 7.0 pH    Protein Albumin Urine Negative NEG^Negative mg/dL    Urobilinogen mg/dL 0.0 0.0 - 2.0 mg/dL    Nitrite Urine  Positive (A) NEG^Negative    Leukocyte Esterase Urine Large (A) NEG^Negative    Source Midstream Urine     RBC Urine 10 (H) 0 - 2 /HPF    WBC Urine >182 (H) 0 - 5 /HPF    WBC Clumps Present (A) NEG^Negative /HPF    Bacteria Urine Many (A) NEG^Negative /HPF    Mucous Urine Present (A) NEG^Negative /LPF       Medications   lactated ringers infusion (has no administration in time range)   ondansetron (ZOFRAN-ODT) ODT tab 4 mg (has no administration in time range)     Or   ondansetron (ZOFRAN) injection 4 mg (has no administration in time range)   gabapentin (NEURONTIN) tablet 600 mg (has no administration in time range)   gabapentin (NEURONTIN) capsule 300 mg (has no administration in time range)   lisinopril (PRINIVIL/ZESTRIL) tablet 5 mg (has no administration in time range)   metoprolol succinate ER (TOPROL-XL) 24 hr tablet 75 mg (has no administration in time range)   tamsulosin (FLOMAX) capsule 0.4 mg (has no administration in time range)   glucose gel 15-30 g (has no administration in time range)     Or   dextrose 50 % injection 25-50 mL (has no administration in time range)     Or   glucagon injection 1 mg (has no administration in time range)   naloxone (NARCAN) injection 0.1-0.4 mg (has no administration in time range)   melatonin tablet 1 mg (has no administration in time range)   insulin degludec (TRESIBA) 100 UNIT/ML injection 22 Units (has no administration in time range)   insulin aspart (NovoLOG) inj (RAPID ACTING) (has no administration in time range)   insulin aspart (NovoLOG) inj (RAPID ACTING) (has no administration in time range)   Patient is already receiving anticoagulation with heparin, enoxaparin (LOVENOX), warfarin (COUMADIN)  or other anticoagulant medication (has no administration in time range)   potassium chloride ER (K-DUR/KLOR-CON M) CR tablet 20-40 mEq (has no administration in time range)   potassium chloride (KLOR-CON) Packet 20-40 mEq (has no administration in time range)   potassium  chloride 10 mEq in 100 mL sterile water intermittent infusion (premix) (has no administration in time range)   potassium chloride 10 mEq in 100 mL intermittent infusion with 10 mg lidocaine (has no administration in time range)   acetaminophen (TYLENOL) tablet 650 mg (has no administration in time range)   oxyCODONE (ROXICODONE) tablet 5-10 mg (has no administration in time range)   HYDROmorphone (PF) (DILAUDID) injection 0.3-0.5 mg (has no administration in time range)   metoclopramide (REGLAN) tablet 5 mg (has no administration in time range)     Or   metoclopramide (REGLAN) injection 5 mg (has no administration in time range)   ondansetron (ZOFRAN-ODT) ODT tab 4 mg (has no administration in time range)     Or   ondansetron (ZOFRAN) injection 4 mg (has no administration in time range)   prochlorperazine (COMPAZINE) injection 5 mg (has no administration in time range)     Or   prochlorperazine (COMPAZINE) tablet 5 mg (has no administration in time range)     Or   prochlorperazine (COMPAZINE) Suppository 12.5 mg (has no administration in time range)   cefTRIAXone in d5w (ROCEPHIN) intermittent infusion 1 g (has no administration in time range)   lactated ringers BOLUS 1,000 mL (0 mLs Intravenous Stopped 4/17/19 0044)       Assessments & Plan (with Medical Decision Making)  73-year-old male presents with epigastric pain, details per HPI.  Workup significant for elevated white count, elevated lipase, pyuria positive nitrite.  Urine culture pending.  Treated with IV fluid, ceftriaxone.  Admit to hospital for fluids n.p.o. ongoing antibiotics.  Reviewed with  who accepts patient for admission.     I have reviewed the nursing notes.    I have reviewed the findings, diagnosis, plan and need for follow up with the patient.             Medication List      There are no discharge medications for this visit.         Final diagnoses:   Acute pancreatitis, unspecified complication status, unspecified pancreatitis type    Urinary tract infection without hematuria, site unspecified       4/16/2019   [facility] EMERGENCY DEPARTMENT     Rory Robles MD  04/17/19 0134

## 2019-04-17 NOTE — PLAN OF CARE
"Most of the night, pt reported to have pain that would \"come and go\", but would deny medication. Did take Oxycodone 5mg X1 resulting in  nausea, medicated with Zofran. Pt lets needs known and is well educated on hospital routine and treatment from past visits to the hospital.   "

## 2019-04-17 NOTE — H&P
Admitted:     04/16/2019      CHIEF COMPLAINT:  Epigastric pain.      HISTORY OF PRESENT ILLNESS:  The patient has a known history of alcoholic pancreatitis, last episode last November.  Presents with an onset of epigastric pain yesterday afternoon which was initially fairly mild and has progressed since that time.  Now the pain is severe, comes in bouts, lasting about 30 seconds and then resolving over a minute or so, and then recurs every 5-7 minutes.  It is primarily epigastric, but radiates down into the left lower quadrant.  It is very reminiscent of his previous pancreatitis pains.  He has not drank any alcohol since last November when he had his last episode.  Up until that point every episode was associated with alcohol use.      PAST MEDICAL HISTORY:   1.  Type 2 diabetes mellitus with diabetic neuropathy.   2.  Hypertension.   3.  BPH.   4.  Chronic atrial fibrillation, on warfarin.   5.  History of alcohol abuse, quit last November.   6.  History of ulcerative colitis, status post colectomy with ongoing loose stools.      PAST SURGICAL HISTORY:  Splenectomy.      MEDICATIONS PRIOR TO ADMISSION:   1.  Gabapentin 600 mg t.i.d., except 900 mg at bedtime.   2.  Lisinopril 5 mg daily.   3.  Metoprolol succinate 75 mg b.i.d.   4.  Flomax 0.4 mg daily.   5.  Aspirin not prescribed.     6.  Glipizide 5 mg daily.     7.  Tarceva insulin degludec 22 units at bedtime.   8.  Multivitamin daily.   9.  Warfarin 2.5 mg daily.      ALLERGIES:  NONE KNOWN.      SOCIAL HISTORY:  He lives with his wife.  He is a retired .  He quit drinking last November.  He quit smoking in 2006.      FAMILY HISTORY:  Reviewed in Epic and is noncontributory.  A number of family members had Alzheimer, and a number have diabetes.      REVIEW OF SYSTEMS:  A 10-point review of systems other than the above was negative.  He had been feeling well prior to this.  He did eat some supper tonight hoping that the pain would actually  resolved, but it got significantly worse after eating.  He has had no UTI symptoms.      OBJECTIVE:   GENERAL:  He is awake, alert, oriented x3, no distress as he is lying there until one of his pain comes in he tends to clench his abdomen.  He is afebrile.   VITAL SIGNS:  Pulse is 86, respirations 18, blood pressure 131/70 and O2 saturation 92% on room air.   HEENT:  Eyes show pupils equal and reactive, EOMs intact.  TMs normal.  Nasal mucosa is normal.  Throat is normal.   NECK:  Supple, without masses, nodes or thyromegaly.   CHEST:  Clear to A and P.   CARDIOVASCULAR:  Regular rate and rhythm without murmur, click or rub.  Pulses are brisk and equal.  No JVD, HJR.  No edema.   ABDOMEN:  Soft with definite tenderness in the epigastrium and over towards the left upper quadrant and down towards the left lower quadrant, stops just at approximately the umbilical line.  This is tenderness to percussion, no rebound.  Minimal or no guarding.  Bowel sounds are active.   GENITOURINARY AND RECTAL:  Deferred.   EXTREMITIES:  Grossly normal.   NEUROLOGIC:  Grossly normal with good finger-nose, rapid alternating movements and strength, sensation in the upper extremities, good strength, sensation, DTRs in lower extremities.      LABORATORY DATA:  Show white count 17.6.  Lipase 850.  UA is positive for nitrites and greater than 182 WBCs per high-power field.      No imaging done at this time.      ASSESSMENT:     1.  Acute pancreatitis with history of recurrent alcoholic pancreatitis.  Certainly by exam and with a slightly elevated lipase, this is consistent with pancreatitis.  We will fluid hydrate with lactated Ringer's.  Pain control with Tylenol, oxycodone as needed and Dilaudid p.r.n.  NPO for now.  We will start to feed him once his pain starts to improve.  The last time he was in, he had the same amount of pain and it took about 4 days.   2.  Diabetes mellitus.  We will hold his metformin.  Continue his long-acting  insulin 22 units at bedtime,   plus sliding scale.   3.  Neuropathy.  We will try to continue his Neurontin at this point since he is quite symptomatic with his neuropathy.   4.  Hypertension.  Continue his home hypertensive meds including lisinopril and metoprolol.   5.  Chronic atrial fibrillation, apparently this is paroxysmal, as he does not appear to be in it now and was not in it last November when he had an EKG.  I will continue his metformin for rate control.  If he goes back into it, continue warfarin.   6.  Questionable urinary tract infection.  His urine is certainly positive and in a male it less likely to have asymptomatic bacteriuria.  I will give a dose of Rocephin.  Check a procalcitonin, check a bladder scan.   7.  CODE STATUS:  Full.   8.  Prophylaxis:  He is on warfarin.      DISPOSITION:  He needs inpatient care.  He has generally taken a couple of days to resolve.         MIKE DICKEY MD             D: 2019   T: 2019   MT: GELACIO      Name:     SAMANTHA CHRISTIAN   MRN:      [id number]        Account:      [id number]   :      1945        Admitted:     2019                   Document: [id number]       cc: Katie Gross MD

## 2019-04-17 NOTE — PROGRESS NOTES
Skin affirmation note    Admitting nurse completed full skin assessment, Romain score and Romain interventions. This writer agrees with the initial skin assessment findings.

## 2019-04-17 NOTE — PHARMACY-ANTICOAGULATION SERVICE
Clinical Pharmacy - Warfarin Dosing Consult     Pharmacy has been consulted to manage this patient s warfarin therapy.  Indication: Atrial Fibrillation  Therapy Goal: INR 2-3  OP Anticoag Clinic: [facility]  Warfarin Prior to Admission: Yes  Warfarin PTA Regimen: 2.5mg daily    INR Protime   Date Value Ref Range Status   03/21/2019 2.5 (A) 0.86 - 1.14 Final   02/07/2019 3.1 (A) 0.86 - 1.14 Final       Patient reports taking warfarin for 4/16.  INR not drawn in ER.  Will check INR this AM and dose accordingly.  Pharmacy will monitor Antoine Russo daily and order warfarin doses to achieve specified goal.      Please contact pharmacy as soon as possible if the warfarin needs to be held for a procedure or if the warfarin goals change.      Kayy Ramirez, PharmD

## 2019-04-17 NOTE — PLAN OF CARE
[facility] [place]    Patient admitted to room 2404 at approximately 0125 via cart from emergency room. Patient was accompanied by transport tech.     Verbal SBAR report received from Destiny ED RN prior to patient arrival.     Patient ambulated to bed independently. Patient alert and oriented X 3. Pain is controlled without any medications. 0-10 Pain Scale: 7. Admission vital signs: Blood pressure 139/68, pulse 74, temperature 97.4  F (36.3  C), temperature source Oral, resp. rate 20, weight 88 kg (194 lb 0.1 oz), SpO2 94 %. Patient was oriented to plan of care, call light, bed controls, tv, telephone, bathroom and visiting hours.     Risk Assessment    The following safety risks were identified during admission: none. Yellow risk band applied: NO.     Skin Initial Assessment    This writer admitted this patient and completed a full skin assessment and Romain score in the Adult PCS flowsheet. Appropriate interventions initiated as needed.     Secondary skin check completed by Vanita FRANKS MS RN.    Romain Risk Assessment  Sensory Perception: 3-->slightly limited(bilat feet neuropathy with numbness)  Moisture: 4-->rarely moist  Activity: 4-->walks frequently  Mobility: 4-->no limitation  Nutrition: 3-->adequate  Friction and Shear: 3-->no apparent problem  Romain Score: 21    Janeen Sifuentes

## 2019-04-17 NOTE — ED NOTES
Patient states that symptoms are concurrent with past history of episodes of pancreatitis but is surprised because he has not had a drink since last November.

## 2019-04-17 NOTE — PROGRESS NOTES
[facility] Medicine Progress Note  Date of Service: 04/17/2019    Assessment & Plan   Antoine Russo is a 73 year old male who presented on 4/16/2019 with the complaint of epigastric pain.    1.  Acute on chronic pancreatitis, in a patient with history of recurrent alcoholic pancreatitis.  Patient describes no EtOH use since 11/2018.  Presented with epigastric pain of 24 hours duration, mild nausea, no emesis, no diarrhea.  Abdominal imaging not performed on admission, though pain is similar to previous episodes of pancreatitis.  Exam notable for a tender but not tense abdomen.  Lipase initially 853.  Has been NPO since admission.  Pain is improved from admission, controlled with oxycodone 5 - 10 mg Q3H PRN, has been receiving 5 mg Q4H since this morning.  Hydromorphone has not been necessary.  Overall appears to be clinically improving.  - Will begin clear liquids as tolerated.  - Decrease IV rate to 100 mL/hour.  - Continue oxycodone 5 to 10 mg Q3H PRN.    2.  Diabetes mellitus.  Poorly controlled as outpatient, Hgb A1c 11.6 on 4/16/19.  Managed as outpatient with glipizide, insulin degludec, and lisinopril.   Was previously on metformin, which was stopped due to diarrhea.  - Glipizide held at admission.  - Continue insulin degludec 22 units at bedtime.  - Add sliding scale at high resistance dosing.    3.  Pyuria, bacteriuria.  Noted on admission UA, culture pending.  Has been started on empiric CTAX.  No urinary symptoms, no fever, but does have leukocytosis.  - Continue empiric CTAX.  - Await urine culture results.    4.  Acute kidney injury.  Baseline GFR 78 to 88.  Admission GFR 58.  May represent mild injury due to hypovolemia, as a result of pancreatitis.  - Has received IVF at 200 mL/hour since admission, hypovolemia likely resolved.  - Recheck GFR in AM.    5.  Leukocytosis.  Due to pancreatitis vs pyuria vs both.  - Recheck tomorrow in response to Rx.    6.   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  Managed with gabapentin as outpatient.  Symptoms currently at baseline.  - Continue outpatient gabapentin.     7.  Hypertension.  Outpatient meds include lisinopril and metoprolol.  BP control here has been good so far.  - Continue preadmission lisinopril and metoprolol.    8.  Chronic atrial fibrillation.  Apparently this is paroxysmal, as he did not appear to be in a-fib on admission, and was not in it 11/2018 when he had an EKG.  Preadmission was on metoprolol far rate control, and warfarin anticoagulation.  Rate control here has been good.    - Continue preadmission metoprolol.  - Continue warfarin, Pharmacy to dose.       DVT Prophylaxis: Warfarin  Code Status: Full.    Lines: Peripheral.   Chavez catheter: None.  Restraints: None.    Discussion: Pancreatitis severity appears mildly improved from admission.  Will resume clear liquids and slow IVF.    Disposition: Anticipate discharge in 1 to 2 additional days.     Attestation:  I have reviewed today's vital signs, notes, medications, labs and imaging.  Amount of time performed on this follow-up visit: 40 minutes.    Baldo Ashford MD      Interval History   \"Good right now\".  Pain nearly absent about two hours after most recent oxycodone dose.  No significant nausea, no emesis, last BM yesterday.    ROS:  CONSTITUTIONAL: NEGATIVE for chills, fever, sweats.  EYES: NEGATIVE for visual changes, eye irritation.  ENT/MOUTH: NEGATIVE for nasal congestion, postnasal drainage or sinus pressure.  RESP: NEGATIVE for dyspnea, cough, wheeze, or respiratory chest pain.   CV: NEGATIVE for chest pain, palpitations, orthopnea.  GI: See above.  : NEGATIVE for difficulties urinating, dysuria.  MUSCULOSKELETAL: NEGATIVE for back pain, joint pain, or joint swelling  NEURO: NEGATIVE for focal numbness or weakness, syncope, stroke or seizure.  Chronic lower extremity neuropathic pain control currently good.  PSYCHIATRIC: NEGATIVE for anxiety or " depression, panic, or recent change in mood.      Physical Exam   Temp:  [97.4  F (36.3  C)-98.4  F (36.9  C)] 98.3  F (36.8  C)  Pulse:  [74-96] 83  Resp:  [18-20] 18  BP: ()/(59-96) 93/59  SpO2:  [91 %-96 %] 93 %    Weights:   Vitals:    04/16/19 2114 04/17/19 0126   Weight: 86.2 kg (190 lb) 88 kg (194 lb 0.1 oz)    Body mass index is 27.84 kg/m .    GENERAL: Sleeping quietly when I entered room.  Awoke to voice, looks comfortable.  EYES: Eyes grossly normal to inspection, extraocular movements intact  HENT: Nares patent bilaterally.  Nasal mucosa normal, no discharge.   NECK: Trachea midline, no stridor.    RESP: No accessory muscle use.  Symmetrical breath sounds.  Lungs clear throughout on inspiration and expiration.  Expiration not prolonged, no wheeze.  CV: Regular rate and rhythm, non-tachycardic.  Normal S1 S2, no murmur or extra sound.  No lower extremity edema.  ABDOMEN: Mildly firm diffusely, not tense, tender only to palpation of LUQ which is mild, no guarding.  Liver and spleen not enlarged, no masses palpable.  Bowel sounds positive.  MS: No bony deformities noted.  No red or inflamed joints.  SKIN: Warm and dry, no rashes.  NEURO: Alert, oriented, conversant.  Cranial nerves III - XII grossly intact.  No gross motor or sensory deficits.   PSYCH: Calm, alert, conversant.  Able to articulate logical thoughts, no tangential thoughts, no hallucinations or delusions.  Affect normal.        Data   Recent Labs   Lab 04/17/19  0545 04/16/19  2142   WBC  --  17.6*   HGB  --  14.4   MCV  --  95   PLT  --  358   INR 2.59*  --    NA  --  135   POTASSIUM  --  4.4   CHLORIDE  --  101   CO2  --  29   BUN  --  18   CR  --  1.24   ANIONGAP  --  5   NILSON  --  9.4   GLC  --  194*   ALBUMIN  --  3.3*   PROTTOTAL  --  7.7   BILITOTAL  --  0.6   ALKPHOS  --  59   ALT  --  28   AST  --  19   LIPASE  --  853*       Recent Labs   Lab 04/17/19  1123 04/17/19  0559 04/17/19  0132 04/16/19  2142   GLC  --   --   --  194*    [facility] 182* 203* 183*  --         Unresulted Labs Ordered in the Past 30 Days of this Admission     Date and Time Order Name Status Description    4/17/2019 0136 Urine Culture Aerobic Bacterial Preliminary            Imaging  No results found for this or any previous visit (from the past 24 hour(s)).     I reviewed all new labs and imaging results over the last 24 hours. I personally reviewed no images or EKG's today.    Medications     lactated ringers 200 mL/hr at 04/17/19 1259     - MEDICATION INSTRUCTIONS -       Warfarin Therapy Reminder         cefTRIAXone  1 g Intravenous Q24H     gabapentin  300 mg Oral At Bedtime     gabapentin  600 mg Oral TID     insulin aspart  1-10 Units Subcutaneous TID AC     insulin aspart  1-7 Units Subcutaneous At Bedtime     insulin degludec  22 Units Subcutaneous At Bedtime     lisinopril  5 mg Oral Daily     metoprolol succinate ER  75 mg Oral BID     tamsulosin  0.4 mg Oral Daily     warfarin  2.5 mg Oral ONCE at 18:00       Baldo Ashford MD

## 2019-04-17 NOTE — PLAN OF CARE
Patient is alert and oriented. Makes needs known. Has been in bed all shift, turns form side to side independently. Urine cloudy yellow, uses urinal at bedside. Afebrile. IV fluid rate changed form 200 ml/hour to 100 ml/hour. Started clear liquids and became nauseated after 60 ml of black coffee. 100 ml emesis. Medicated with zofran. Patient requesting diet cola now. Printed patient education provided on pancreatitis. Abdominal pain waxes and wanes, tolerable with oxycodone as ordered.

## 2019-04-18 ENCOUNTER — APPOINTMENT (OUTPATIENT)
Dept: ULTRASOUND IMAGING | Facility: CLINIC | Age: 74
End: 2019-04-18
Attending: FAMILY MEDICINE
Payer: COMMERCIAL

## 2019-04-18 PROBLEM — K86.3 PANCREATIC PSEUDOCYST: Status: ACTIVE | Noted: 2019-04-18

## 2019-04-18 LAB
ANION GAP SERPL CALCULATED.3IONS-SCNC: 4 MMOL/L (ref 3–14)
BACTERIA SPEC CULT: ABNORMAL
BUN SERPL-MCNC: 13 MG/DL (ref 7–30)
CALCIUM SERPL-MCNC: 8.3 MG/DL (ref 8.5–10.1)
CHLORIDE SERPL-SCNC: 104 MMOL/L (ref 94–109)
CO2 SERPL-SCNC: 29 MMOL/L (ref 20–32)
CREAT SERPL-MCNC: 0.97 MG/DL (ref 0.66–1.25)
ERYTHROCYTE [DISTWIDTH] IN BLOOD BY AUTOMATED COUNT: 15 % (ref 10–15)
GFR SERPL CREATININE-BSD FRML MDRD: 78 ML/MIN/{1.73_M2}
GLUCOSE BLDC GLUCOMTR-MCNC: 105 MG/DL (ref 70–99)
GLUCOSE BLDC GLUCOMTR-MCNC: 122 MG/DL (ref 70–99)
GLUCOSE BLDC GLUCOMTR-MCNC: 159 MG/DL (ref 70–99)
GLUCOSE BLDC GLUCOMTR-MCNC: 168 MG/DL (ref 70–99)
GLUCOSE BLDC GLUCOMTR-MCNC: 85 MG/DL (ref 70–99)
GLUCOSE SERPL-MCNC: 137 MG/DL (ref 70–99)
HCT VFR BLD AUTO: 40.9 % (ref 40–53)
HGB BLD-MCNC: 12.5 G/DL (ref 13.3–17.7)
INR PPP: 3.06 (ref 0.86–1.14)
LIPASE SERPL-CCNC: 383 U/L (ref 73–393)
Lab: ABNORMAL
MCH RBC QN AUTO: 29.2 PG (ref 26.5–33)
MCHC RBC AUTO-ENTMCNC: 30.6 G/DL (ref 31.5–36.5)
MCV RBC AUTO: 96 FL (ref 78–100)
PLATELET # BLD AUTO: 285 10E9/L (ref 150–450)
POTASSIUM SERPL-SCNC: 3.9 MMOL/L (ref 3.4–5.3)
PROCALCITONIN SERPL-MCNC: <0.05 NG/ML
RBC # BLD AUTO: 4.28 10E12/L (ref 4.4–5.9)
SODIUM SERPL-SCNC: 137 MMOL/L (ref 133–144)
SPECIMEN SOURCE: ABNORMAL
WBC # BLD AUTO: 12.4 10E9/L (ref 4–11)

## 2019-04-18 PROCEDURE — 25000132 ZZH RX MED GY IP 250 OP 250 PS 637: Performed by: FAMILY MEDICINE

## 2019-04-18 PROCEDURE — 25000128 H RX IP 250 OP 636: Performed by: FAMILY MEDICINE

## 2019-04-18 PROCEDURE — 36415 COLL VENOUS BLD VENIPUNCTURE: CPT | Performed by: FAMILY MEDICINE

## 2019-04-18 PROCEDURE — 85610 PROTHROMBIN TIME: CPT | Performed by: FAMILY MEDICINE

## 2019-04-18 PROCEDURE — 99233 SBSQ HOSP IP/OBS HIGH 50: CPT

## 2019-04-18 PROCEDURE — 85027 COMPLETE CBC AUTOMATED: CPT | Performed by: FAMILY MEDICINE

## 2019-04-18 PROCEDURE — 25000132 ZZH RX MED GY IP 250 OP 250 PS 637

## 2019-04-18 PROCEDURE — 83690 ASSAY OF LIPASE: CPT | Performed by: FAMILY MEDICINE

## 2019-04-18 PROCEDURE — 00000146 ZZHCL STATISTIC GLUCOSE BY METER IP

## 2019-04-18 PROCEDURE — 84145 PROCALCITONIN (PCT): CPT | Performed by: FAMILY MEDICINE

## 2019-04-18 PROCEDURE — 25800030 ZZH RX IP 258 OP 636

## 2019-04-18 PROCEDURE — 80048 BASIC METABOLIC PNL TOTAL CA: CPT | Performed by: FAMILY MEDICINE

## 2019-04-18 PROCEDURE — 76700 US EXAM ABDOM COMPLETE: CPT

## 2019-04-18 PROCEDURE — 12000000 ZZH R&B MED SURG/OB

## 2019-04-18 RX ORDER — CEPHALEXIN 500 MG/1
500 CAPSULE ORAL EVERY 8 HOURS SCHEDULED
Status: DISCONTINUED | OUTPATIENT
Start: 2019-04-18 | End: 2019-04-19 | Stop reason: HOSPADM

## 2019-04-18 RX ORDER — OXYCODONE HYDROCHLORIDE 5 MG/1
5 TABLET ORAL
Status: DISCONTINUED | OUTPATIENT
Start: 2019-04-18 | End: 2019-04-19 | Stop reason: HOSPADM

## 2019-04-18 RX ORDER — WARFARIN SODIUM 2.5 MG/1
2.5 TABLET ORAL
Status: COMPLETED | OUTPATIENT
Start: 2019-04-18 | End: 2019-04-18

## 2019-04-18 RX ADMIN — METOPROLOL SUCCINATE 75 MG: 50 TABLET, EXTENDED RELEASE ORAL at 20:06

## 2019-04-18 RX ADMIN — CEFTRIAXONE SODIUM 1 G: 1 INJECTION, SOLUTION INTRAVENOUS at 01:50

## 2019-04-18 RX ADMIN — GABAPENTIN 600 MG: 600 TABLET, FILM COATED ORAL at 09:48

## 2019-04-18 RX ADMIN — GABAPENTIN 600 MG: 600 TABLET, FILM COATED ORAL at 21:50

## 2019-04-18 RX ADMIN — TAMSULOSIN HYDROCHLORIDE 0.4 MG: 0.4 CAPSULE ORAL at 09:48

## 2019-04-18 RX ADMIN — METOPROLOL SUCCINATE 75 MG: 50 TABLET, EXTENDED RELEASE ORAL at 09:48

## 2019-04-18 RX ADMIN — WARFARIN SODIUM 2.5 MG: 2.5 TABLET ORAL at 17:17

## 2019-04-18 RX ADMIN — GABAPENTIN 300 MG: 300 CAPSULE ORAL at 21:50

## 2019-04-18 RX ADMIN — INSULIN DEGLUDEC INJECTION 22 UNITS: 100 INJECTION, SOLUTION SUBCUTANEOUS at 21:53

## 2019-04-18 RX ADMIN — OXYCODONE HYDROCHLORIDE 5 MG: 5 TABLET ORAL at 09:55

## 2019-04-18 RX ADMIN — CEPHALEXIN 500 MG: 500 CAPSULE ORAL at 21:50

## 2019-04-18 RX ADMIN — CEPHALEXIN 500 MG: 500 CAPSULE ORAL at 15:37

## 2019-04-18 RX ADMIN — SODIUM CHLORIDE, POTASSIUM CHLORIDE, SODIUM LACTATE AND CALCIUM CHLORIDE: 600; 310; 30; 20 INJECTION, SOLUTION INTRAVENOUS at 08:48

## 2019-04-18 RX ADMIN — LISINOPRIL 5 MG: 5 TABLET ORAL at 09:48

## 2019-04-18 RX ADMIN — GABAPENTIN 600 MG: 600 TABLET, FILM COATED ORAL at 14:06

## 2019-04-18 ASSESSMENT — ACTIVITIES OF DAILY LIVING (ADL)
ADLS_ACUITY_SCORE: 11

## 2019-04-18 NOTE — PLAN OF CARE
Patient ate 100 % of regular food for supper without increase in pain or nausea. IV saline locked. Started oral antibiotic this afternoon, printed patient education provided on keflex. Up in room and mosher this afternoon independently with steady gait. Instructed on incentive spirometry as lungs diminished and low grade temperature-pulls to 1500. Makes needs known.

## 2019-04-18 NOTE — PLAN OF CARE
Pt had a quiet night. Denied any abdominal discomforts. Tolerating clear liquids well. On RA. Afebrile. VSS. Up independently.

## 2019-04-18 NOTE — PROGRESS NOTES
[facility] Medicine Progress Note  Date of Service: 04/18/2019    Assessment & Plan   Antoine Russo is a 73 year old male who presented on 4/16/2019 with the complaint of epigastric pain.    1.  Acute on chronic pancreatitis, in a patient with history of recurrent alcoholic pancreatitis.  Patient describes no EtOH use since 11/2018.  Presented with epigastric pain of 24 hours duration, mild nausea, no emesis, no diarrhea.  Abdominal imaging not performed on admission, though pain was described as similar to previous episodes of pancreatitis.  Exam was notable for a tender but not tense abdomen.  Lipase initially 853.  Pain has gradually improved since admission.  Pain is improved from admission, has required only 10 mg oxycodone in the past 24 hours.  Hydromorphone has not been necessary.  He is tolerating clear liquids well, and wants to try to advance his diet.  RUQ ultrasound performed today to exclude obstructive etiology of pancreatitis shows only a pseudocyst, which he has had before, and is otherwise unremarkable.   - Advance diet to general.  - Discontinue IVF.  - Continue oxycodone 5 to 10 mg Q3H PRN.    2.  Diabetes mellitus.  Poorly controlled as outpatient, Hgb A1c 11.6 on 4/16/19.  Managed as outpatient with glipizide, insulin degludec, and lisinopril.   Was previously on metformin, which was stopped due to diarrhea.  Glipizide was held at admission.  Glucose control here has been good on degludec and sliding scale.  - No indication to restart glipizide at this point.  - Continue insulin degludec 22 units at bedtime.  - Continue sliding scale at high resistance dosing.    3.  E coli cystitis.  Pyuria and bacteriuria noted on admission UA, culture has subsequently grown E coli.  Has been started on empiric CTAX, on which he has no urinary symptoms, no fever.  - Discontinue empiric CTAX.  - Begin cephalexin 500 mg TID through 4/20/19.    4.  Acute kidney  "injury.  Baseline GFR 78 to 88.  Admission GFR 58, probably due to hypovolemia as a result of pancreatitis.  GFR 78 today after IVF, back to baseline range.  - Discontinue IVF.    5.  Leukocytosis.  Due to pancreatitis vs pyuria vs both.  WBC 17.6 on admission --> 12.4 today.  - Improving with antibiotic therapy and improvements in pancreatitis.  Will stop routine monitoring.    6.  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  Managed with gabapentin as outpatient.  Symptoms currently at baseline.  - Continue outpatient gabapentin.     7.  Hypertension.  Outpatient meds include lisinopril and metoprolol.  BP control here has been good so far.  - Continue preadmission lisinopril and metoprolol.    8.  Chronic atrial fibrillation.  Apparently this is paroxysmal, as he did not appear to be in a-fib on admission, and was not in it 11/2018 when he had an EKG.  Preadmission was on metoprolol far rate control, and warfarin anticoagulation.  Rate control here has been good.    - Continue preadmission metoprolol.  - Continue warfarin, Pharmacy managing dosing.       DVT Prophylaxis: Warfarin  Code Status: Full.    Lines: Peripheral.   Chavez catheter: None.  Restraints: None.    Discussion: Pancreatitis continues to improve.  Will see how he does on an expanded diet and off of IVF, discharge in the morning if tolerated well.    Disposition: Anticipate discharge in the morning.    Attestation:  I have reviewed today's vital signs, notes, medications, labs and imaging.  Amount of time performed on this follow-up visit: 35 minutes.    Baldo Ashford MD      Interval History   \"I'm doing great\".  Abdominal pain improved, has required only two doses of oxycodone in the past 24 hours.  No nausea, no emesis.  Hasn't been up walking outside the room yet.    ROS:  CONSTITUTIONAL: NEGATIVE for chills, fever, sweats.  EYES: NEGATIVE for visual changes, eye irritation.  ENT/MOUTH: NEGATIVE for nasal congestion, postnasal drainage or sinus " pressure.  RESP: NEGATIVE for dyspnea, cough, wheeze, or respiratory chest pain.   CV: NEGATIVE for chest pain, palpitations, orthopnea.  GI: See above.  : NEGATIVE for difficulties urinating, dysuria.  MUSCULOSKELETAL: NEGATIVE for back pain, joint pain, or joint swelling  NEURO: NEGATIVE for focal numbness or weakness, syncope, stroke or seizure.  Chronic lower extremity neuropathic pain control currently good.  PSYCHIATRIC: NEGATIVE for anxiety or depression, panic, or recent change in mood.      Physical Exam   Temp:  [97.9  F (36.6  C)-99.6  F (37.6  C)] 99.2  F (37.3  C)  Pulse:  [71-83] 80  Resp:  [16-20] 18  BP: (105-124)/(52-63) 112/63  SpO2:  [91 %-94 %] 93 %    Weights:   Vitals:    04/16/19 2114 04/17/19 0126   Weight: 86.2 kg (190 lb) 88 kg (194 lb 0.1 oz)    Body mass index is 27.84 kg/m .    GENERAL: Pleasant man, lying in bed, looks comfortable.  EYES: Eyes grossly normal to inspection, extraocular movements intact  HENT: Nares patent bilaterally.  Nasal mucosa normal, no discharge.   NECK: Trachea midline, no stridor.    RESP: No accessory muscle use.  Symmetrical breath sounds.  Lungs clear throughout on inspiration and expiration.  Expiration not prolonged, no wheeze.  CV: Regular rate and rhythm, non-tachycardic.  Normal S1 S2, no murmur or extra sound.  No lower extremity edema.  ABDOMEN: Very mildly firm, not tense, non-tender to palpation.  Liver and spleen not enlarged, no masses palpable.  Bowel sounds positive.  MS: No bony deformities noted.  No red or inflamed joints.  SKIN: Warm and dry, no rashes.  NEURO: Alert, oriented, conversant.  Cranial nerves III - XII grossly intact.  No gross motor or sensory deficits.   PSYCH: Calm, alert, conversant.  Able to articulate logical thoughts, no tangential thoughts, no hallucinations or delusions.  Affect normal.        Data   Recent Labs   Lab 04/18/19  0511 04/17/19  0545 04/16/19  2142   WBC 12.4*  --  17.6*   HGB 12.5*  --  14.4   MCV 96  --   95     --  358   INR 3.06* 2.59*  --      --  135   POTASSIUM 3.9  --  4.4   CHLORIDE 104  --  101   CO2 29  --  29   BUN 13  --  18   CR 0.97  --  1.24   ANIONGAP 4  --  5   NILSON 8.3*  --  9.4   *  --  194*   ALBUMIN  --   --  3.3*   PROTTOTAL  --   --  7.7   BILITOTAL  --   --  0.6   ALKPHOS  --   --  59   ALT  --   --  28   AST  --   --  19   LIPASE 383  --  853*       Recent Labs   Lab 04/18/19  1046 04/18/19  0837 04/18/19  0511 04/18/19  0216 04/17/19  2152 04/17/19  1636 04/17/19  1123  04/16/19  2142   GLC  --   --  137*  --   --   --   --   --  194*   * 122*  --  159* 207* 156* 182*   < >  --     < > = values in this interval not displayed.        Unresulted Labs Ordered in the Past 30 Days of this Admission     Date and Time Order Name Status Description    4/17/2019 0136 Urine Culture Aerobic Bacterial Preliminary            Imaging  Recent Results (from the past 24 hour(s))   US Abdomen Complete    Narrative    ABDOMINAL ULTRASOUND  4/18/2019 9:40 AM     HISTORY:  Pancreatitis.    COMPARISON: CT 11/24/2018    FINDINGS:    Gallbladder:  Normal with no cholelithiasis, wall thickening or focal  tenderness.      Bile ducts:   CHD is normal diameter.  No intrahepatic biliary  dilatation.    Liver:   Normal.     Pancreas:  There is a complex cystic structure associated with the  head of the pancreas that measures 3.9 x 9.5 x 6.3 cm. No internal  vascularity. No significant wall thickening.    Spleen:   Normal.     Right kidney:  2.1 cm cyst noted at the upper pole of the right  kidney. A 5.4 cm cyst is noted at the lower pole of the right kidney.    Left kidney:   Normal.    Aorta and IVC:   Normal.       Impression    IMPRESSION:  Complex cystic structure associated with the head of the  pancreas in a patient with prior pancreatitis is compatible with a  pseudocyst. This pseudocyst measures up to 9.5 cm. If there are signs  of infection, consider aspiration.    JACK MORALES MD         I reviewed all new labs and imaging results over the last 24 hours. I personally reviewed no images or EKG's today.    Medications     lactated ringers 100 mL/hr at 04/18/19 0848     - MEDICATION INSTRUCTIONS -       Warfarin Therapy Reminder         cefTRIAXone  1 g Intravenous Q24H     gabapentin  300 mg Oral At Bedtime     gabapentin  600 mg Oral TID     insulin aspart  1-10 Units Subcutaneous TID AC     insulin aspart  1-7 Units Subcutaneous At Bedtime     insulin degludec  22 Units Subcutaneous At Bedtime     lisinopril  5 mg Oral Daily     metoprolol succinate ER  75 mg Oral BID     tamsulosin  0.4 mg Oral Daily     warfarin  2.5 mg Oral ONCE at 18:00       Baldo Ashford MD

## 2019-04-18 NOTE — PLAN OF CARE
Patient awake and alert during shift.  Lung sounds clear and equal bilaterally.  Abdomen slightly distended, bowel sounds audible and active in all 4 quadrants.  Urine remains cloudy.  IV continues with LR at 100 mL/hr.  Patient denied pain during shift.  VSS.  Up independent in room.  Tolerated clear liquids well for dinner.  Ana Meyer RN 11:55 PM 4/17/2019

## 2019-04-18 NOTE — CONSULTS
Care Transition Initial Assessment - RN      Met with: Patient.    DATA   Principal Problem:    Acute on chronic pancreatitis (H)  Active Problems:    Hypertension, benign essential, goal below 140/90    Type 2 diabetes mellitus with diabetic polyneuropathy, without long-term current use of insulin (H)    Chronic atrial fibrillation (H)    Acute kidney injury (H)    Leukocytosis    Bacteriuria with pyuria       Primary Care Clinic Name: [facility]  Primary Care MD Name: Katie Yoder MD  Contact information and PCP information verified: Yes    ASSESSMENT  Cognitive Status: awake, alert and oriented.                      Description of Support System: Supportive, Involved           Insurance Concerns: No Insurance issues identified    This writer met with pt, introduced self and role. Care Transitions is consulted for elevated CARLOS ENRIQUE score. This writer discussed discharge planning and Medicare guidelines in regards to home care and TCU. Pt lives with his spouse in the community. Pt has no services at home and he does have family support.  Patient is in agreement and has a goal of going home upon discharge.  This writer discussed with the patient the private pay costs for transportation, patient aware. Transportation provided by [employer].  This writer offered to make an appt with this patients PCP for hospital follow up related to his elevated CARLOS ENRIQUE score. Patient refused, he will make his own appt.  CTS note. Chart reviewed and pt discussed in interdisciplinary rounds. No anticipated discharge needs at this time. CTS team will continue to monitor daily in interdisciplinary rounds and will intervene as needed. If immediate needs arise, please contact CTS team at [phone].     PLAN  No needs      Elizabeth Garcia RN Care Coordinator  [facility] [phone]  [facility] [phone]

## 2019-04-19 ENCOUNTER — ANTICOAGULATION THERAPY VISIT (OUTPATIENT)
Dept: ANTICOAGULATION | Facility: CLINIC | Age: 74
End: 2019-04-19
Payer: COMMERCIAL

## 2019-04-19 VITALS
TEMPERATURE: 98.1 F | SYSTOLIC BLOOD PRESSURE: 120 MMHG | OXYGEN SATURATION: 92 % | BODY MASS INDEX: 27.84 KG/M2 | WEIGHT: 194 LBS | DIASTOLIC BLOOD PRESSURE: 64 MMHG | RESPIRATION RATE: 18 BRPM | HEART RATE: 69 BPM

## 2019-04-19 DIAGNOSIS — Z79.01 LONG TERM CURRENT USE OF ANTICOAGULANT THERAPY: ICD-10-CM

## 2019-04-19 DIAGNOSIS — I48.20 CHRONIC ATRIAL FIBRILLATION (H): ICD-10-CM

## 2019-04-19 LAB
GLUCOSE BLDC GLUCOMTR-MCNC: 131 MG/DL (ref 70–99)
GLUCOSE BLDC GLUCOMTR-MCNC: 141 MG/DL (ref 70–99)
INR PPP: 2.66 (ref 0.86–1.14)

## 2019-04-19 PROCEDURE — 99238 HOSP IP/OBS DSCHRG MGMT 30/<: CPT

## 2019-04-19 PROCEDURE — 00000146 ZZHCL STATISTIC GLUCOSE BY METER IP

## 2019-04-19 PROCEDURE — 25000132 ZZH RX MED GY IP 250 OP 250 PS 637: Performed by: FAMILY MEDICINE

## 2019-04-19 PROCEDURE — 25000132 ZZH RX MED GY IP 250 OP 250 PS 637

## 2019-04-19 PROCEDURE — 85610 PROTHROMBIN TIME: CPT | Performed by: FAMILY MEDICINE

## 2019-04-19 PROCEDURE — 99207 ZZC NO CHARGE NURSE ONLY: CPT

## 2019-04-19 PROCEDURE — 36415 COLL VENOUS BLD VENIPUNCTURE: CPT | Performed by: FAMILY MEDICINE

## 2019-04-19 RX ORDER — CEPHALEXIN 500 MG/1
500 CAPSULE ORAL EVERY 8 HOURS
Qty: 6 CAPSULE | Refills: 0 | Status: ON HOLD | OUTPATIENT
Start: 2019-04-19 | End: 2019-05-22

## 2019-04-19 RX ADMIN — TAMSULOSIN HYDROCHLORIDE 0.4 MG: 0.4 CAPSULE ORAL at 08:06

## 2019-04-19 RX ADMIN — LISINOPRIL 5 MG: 5 TABLET ORAL at 08:05

## 2019-04-19 RX ADMIN — GABAPENTIN 600 MG: 600 TABLET, FILM COATED ORAL at 08:06

## 2019-04-19 RX ADMIN — CEPHALEXIN 500 MG: 500 CAPSULE ORAL at 05:43

## 2019-04-19 RX ADMIN — INSULIN ASPART 1 UNITS: 100 INJECTION, SOLUTION INTRAVENOUS; SUBCUTANEOUS at 08:43

## 2019-04-19 RX ADMIN — METOPROLOL SUCCINATE 75 MG: 50 TABLET, EXTENDED RELEASE ORAL at 08:05

## 2019-04-19 ASSESSMENT — ACTIVITIES OF DAILY LIVING (ADL)
ADLS_ACUITY_SCORE: 11

## 2019-04-19 NOTE — DISCHARGE INSTRUCTIONS
Warfarin:  Please continue your current dose of 2.5 mg daily. Follow up with the Anticoagulation in the next two weeks.

## 2019-04-19 NOTE — PROGRESS NOTES
[facility] DISCHARGE NOTE    Patient discharged to home at 10:55 AM via wheel chair. Accompanied by spouse and staff. Discharge instructions reviewed with patient and spouse, opportunity offered to ask questions. Prescriptions sent to patients preferred pharmacy. All belongings sent with patient.    Megan Oglesby

## 2019-04-19 NOTE — DISCHARGE SUMMARY
[facility]    Discharge Summary  Hospital Medicine    Date of Admission:  4/16/2019  Date of Discharge:  4/19/2019   Discharging Provider: Baldo Ashford  Date of Service: 4/19/2019      Primary Care     [address] [facility] [address]      Identification and Chief Compaint:  nAtoine Russo is a 73 year old male who presented on 4/16/2019 with the complaint of epigastric pain.    Discharge Diagnoses       Acute on chronic pancreatitis (H)    Hypertension, benign essential, goal below 140/90    Type 2 diabetes mellitus with diabetic polyneuropathy, without long-term current use of insulin (H)    Chronic atrial fibrillation (H)    Acute kidney injury (H)    Leukocytosis    Bacteriuria with pyuria    Pancreatic pseudocyst    Discharge Disposition   Discharged to home    Discharge Orders   No discharge procedures on file.     Discharge Medications   Current Discharge Medication List      CONTINUE these medications which have NOT CHANGED    Details   gabapentin (NEURONTIN) 300 MG capsule Take 1 capsule (300 mg) by mouth At Bedtime 1 tablet along with his 600mg dose at bedtime. Total gabapentin dose is 600mg/600mg/900mg.  Qty: 90 capsule, Refills: 2    Associated Diagnoses: Peripheral polyneuropathy      gabapentin (NEURONTIN) 600 MG tablet Take 1 tablet (600 mg) by mouth 3 times daily Along with 300mg for a total of 900mg for bedtime dose  Qty: 270 tablet, Refills: 2    Associated Diagnoses: Peripheral polyneuropathy      glipiZIDE (GLUCOTROL XL) 5 MG 24 hr tablet Take 1 tablet (5 mg) by mouth daily  Qty: 60 tablet, Refills: 3    Associated Diagnoses: Type 2 diabetes mellitus with diabetic polyneuropathy, without long-term current use of insulin (H)      insulin degludec (TRESIBA) 200 UNIT/ML pen Take 22 units at bedtime (increase by 2 units every 4 days until am readings are under 120) max dose: 32.  Qty: 9 mL, Refills: 1    Associated Diagnoses: Type 2 diabetes  mellitus with diabetic polyneuropathy, without long-term current use of insulin (H)      lisinopril (PRINIVIL/ZESTRIL) 5 MG tablet TAKE 1 TABLET BY MOUTH ONCE DAILY  Qty: 90 tablet, Refills: 1    Associated Diagnoses: Hypertension, benign essential, goal below 140/90      metoprolol succinate (TOPROL-XL) 50 MG 24 hr tablet Take 1.5 tablets (75 mg) by mouth 2 times daily  Qty: 270 tablet, Refills: 3    Associated Diagnoses: Hypertension, benign essential, goal below 140/90      multivitamin, therapeutic with minerals (THERA-VIT-M) TABS Take 1 tablet by mouth daily.  Qty: 30 each, Refills: 1    Associated Diagnoses: Surgery aftercare      tamsulosin (FLOMAX) 0.4 MG capsule TAKE ONE CAPSULE BY MOUTH ONCE DAILY  Qty: 90 capsule, Refills: 2    Associated Diagnoses: Urgency of urination; Hypertrophy of prostate without urinary obstruction      vitamin B-12 (CYANOCOBALAMIN) 100 MCG tablet Take 100 mcg by mouth daily      warfarin (COUMADIN) 2.5 MG tablet TAKE 1 TABLET BY MOUTH ONCE DAILY OR  AS  DIRECTED  BY  ACC  Qty: 90 tablet, Refills: 0    Associated Diagnoses: Chronic atrial fibrillation (H)      ASPIRIN NOT PRESCRIBED (INTENTIONAL) Antiplatelet medication not prescribed intentionally due to Current anticoagulant therapy (warfarin/enoxaparin)      blood glucose (CONTOUR NEXT TEST) test strip Use to test blood sugar 2 times daily or as directed.  Qty: 100 each, Refills: 11    Associated Diagnoses: Type 2 diabetes mellitus with diabetic polyneuropathy, without long-term current use of insulin (H)      insulin pen needle (BD LUIS U/F) 32G X 4 MM miscellaneous Use 1 daily as directed.  Qty: 100 each, Refills: 4    Associated Diagnoses: Type 2 diabetes mellitus with diabetic polyneuropathy, without long-term current use of insulin (H)           Allergies   Allergies   Allergen Reactions     Nkda [No Known Drug Allergies]        Consultations This Hospital Stay   Consultation during this admission received from:    PHARMACY  TO DOSE WARFARIN  CARE TRANSITION RN/SW IP CONSULT    Significant Results and Procedures   Procedures    None.    Data   Results for orders placed or performed during the hospital encounter of 04/16/19   US Abdomen Complete    Narrative    ABDOMINAL ULTRASOUND  4/18/2019 9:40 AM     HISTORY:  Pancreatitis.    COMPARISON: CT 11/24/2018    FINDINGS:    Gallbladder:  Normal with no cholelithiasis, wall thickening or focal  tenderness.      Bile ducts:   CHD is normal diameter.  No intrahepatic biliary  dilatation.    Liver:   Normal.     Pancreas:  There is a complex cystic structure associated with the  head of the pancreas that measures 3.9 x 9.5 x 6.3 cm. No internal  vascularity. No significant wall thickening.    Spleen:   Normal.     Right kidney:  2.1 cm cyst noted at the upper pole of the right  kidney. A 5.4 cm cyst is noted at the lower pole of the right kidney.    Left kidney:   Normal.    Aorta and IVC:   Normal.       Impression    IMPRESSION:  Complex cystic structure associated with the head of the  pancreas in a patient with prior pancreatitis is compatible with a  pseudocyst. This pseudocyst measures up to 9.5 cm. If there are signs  of infection, consider aspiration.    JACK MORALES MD       History of Present Illness   (From H&P) The patient has a known history of alcoholic pancreatitis, last episode last November.  Presents with an onset of epigastric pain yesterday afternoon which was initially fairly mild and has progressed since that time.  Now the pain is severe, comes in bouts, lasting about 30 seconds and then resolving over a minute or so, and then recurs every 5-7 minutes.  It is primarily epigastric, but radiates down into the left lower quadrant.  It is very reminiscent of his previous pancreatitis pains.  He has not drank any alcohol since last November when he had his last episode.  Up until that point every episode was associated with alcohol use.       Hospital Course   Antoine LOPEZ  "Rafaela was admitted on 4/16/2019.  The following problems were addressed during his hospitalization:    1.  Acute on chronic pancreatitis, in a patient with history of recurrent alcoholic pancreatitis.  Patient describes no EtOH use since 11/2018.  Presented with epigastric pain of 24 hours duration, mild nausea, no emesis, no diarrhea.  Abdominal imaging not performed on admission, though pain was described as similar to previous episodes of pancreatitis.  Exam was notable for a tender but not tense abdomen.  Lipase initially 853.   RUQ ultrasound performed today to exclude obstructive etiology of pancreatitis shows only a pseudocyst, which he has had before, and is otherwise unremarkable.  Pain has gradually improved since admission, and this morning is essentially resolved.  Has required no oxycodone since yesterday.  He is tolerating a general diet well, had a \"normal\" BM last night.  He feels well, and requests discharge.  - Discharge home today.     2.  Diabetes mellitus.  Poorly controlled as outpatient, Hgb A1c 11.6 on 4/16/19.  Managed as outpatient with glipizide, insulin degludec, and lisinopril.   Was previously on metformin, which was stopped due to diarrhea.  Glipizide was held at admission.  Glucose control here has been good on degludec and sliding scale, 85 to 168.  - No indication to restart glipizide at this point, but could be considered if hyperglycemia recurs after discharge.  - Continue insulin degludec 22 units at bedtime.     3.  E coli cystitis.  Pyuria and bacteriuria noted on admission UA, culture has subsequently grown E coli.  Was started on empiric CTAX, which was then changed to cephalexin PO on 4/18/19 which he is tolerating well.  Has no urinary symptoms.  - Continue cephalexin 500 mg TID through 4/20/19.     4.  Acute kidney injury.  Baseline GFR 78 to 88.  Admission GFR 58, probably due to hypovolemia as a result of pancreatitis.  GFR 78 after IVF, back to baseline " range.     5.  Leukocytosis.  Due to pancreatitis vs pyuria vs both.  WBC 17.6 on admission --> 12.4 on 4/18/19.  - Improved with antibiotic therapy and improvements in pancreatitis.     6.  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  Managed with gabapentin as outpatient.  Symptoms currently at baseline.  - Continue outpatient gabapentin.      7.  Hypertension.  Outpatient meds include lisinopril and metoprolol.  BP control here has been good.  - Continue preadmission lisinopril and metoprolol.     8.  Chronic atrial fibrillation.  Apparently this is paroxysmal, as he did not appear to be in a-fib on admission, and was not in it 11/2018 when he had an EKG.  Preadmission was on metoprolol far rate control, and warfarin anticoagulation.  Rate control here has been good.    - Continue preadmission metoprolol.  - Continue warfarin, Anticoagulation Clinic will resume management of dosing at discharge.          Pending Results   Unresulted Labs Ordered in the Past 30 Days of this Admission     No orders found from 2/15/2019 to 4/17/2019.          Physical Exam   Temp:  [98.1  F (36.7  C)-99.6  F (37.6  C)] 98.1  F (36.7  C)  Pulse:  [69-80] 69  Resp:  [16-18] 18  BP: (107-120)/(52-64) 120/64  SpO2:  [90 %-93 %] 92 %  Vitals:    04/16/19 2114 04/17/19 0126   Weight: 86.2 kg (190 lb) 88 kg (194 lb 0.1 oz)       GENERAL: Pleasant man, walking around room, looks comfortable.  EYES: Eyes grossly normal to inspection, extraocular movements intact  HENT: Nares patent bilaterally.  Nasal mucosa normal, no discharge.   NECK: Trachea midline, no stridor.    RESP: No accessory muscle use.  Symmetrical breath sounds.  Lungs clear throughout on inspiration and expiration.  Expiration not prolonged, no wheeze.  CV: Regular rate and rhythm, non-tachycardic.  Normal S1 S2, no murmur or extra sound.  No lower extremity edema.  ABDOMEN: Soft, non-tender to palpation.  Liver and spleen not enlarged, no masses palpable.  Bowel sounds positive.  MS: No  bony deformities noted.  No red or inflamed joints.  SKIN: Warm and dry, no rashes.  NEURO: Alert, oriented, conversant.  Cranial nerves III - XII grossly intact.  No gross motor or sensory deficits.  Gait steady and symmetrical.   PSYCH: Calm, alert, conversant.  Able to articulate logical thoughts, no tangential thoughts, no hallucinations or delusions.  Affect normal.          The discharge plan was discussed with the patient, who expresses agreement.    Total time on this discharge was 25 minutes.    Baldo Ashford MD

## 2019-04-19 NOTE — PHARMACY-ANTICOAGULATION SERVICE
Clinical Pharmacy- Warfarin Discharge Note  This patient is currently on warfarin for the treatment of Atrial fibrillation.  INR Goal= 2-3  Expected length of therapy lifetime.    Warfarin PTA Regimen: 2.5 mg daily      Anticoagulation Dose History     Recent Dosing and Labs Latest Ref Rng & Units 11/27/2018 12/27/2018 2/7/2019 3/21/2019 4/17/2019 4/18/2019 4/19/2019    Warfarin 2.5 mg - - - - - 2.5 mg 2.5 mg -    INR 0.86 - 1.14 1.89(H) - - - 2.59(H) 3.06(H) 2.66(H)    INR 0.86 - 1.14 - 2.4(A) 3.1(A) 2.5(A) - - -          Continue home dose of 2.5 mg daily  The patient should have an INR checked 2 weeks.

## 2019-04-19 NOTE — PLAN OF CARE
Tolerating a regular diet.  Denies nausea, BM last evening (4/18/19).  Denies abdominal pain.  Independent room.  Calls with needs.

## 2019-04-22 ENCOUNTER — TELEPHONE (OUTPATIENT)
Dept: FAMILY MEDICINE | Facility: CLINIC | Age: 74
End: 2019-04-22

## 2019-04-22 NOTE — TELEPHONE ENCOUNTER
ED/UC/IP follow up phone call: [facility] discharge 4/19/19 Acute pancreatitis, unspecified complication status, unspecified pancreatitis type, Bacteriuria with pyuria    RN please call to follow up.    Number of ED visits in past 12 months = 0

## 2019-04-23 NOTE — TELEPHONE ENCOUNTER
ED / Discharge Outreach Protocol    Patient Contact    Attempt # 2    Was call answered?  No.  Left message on voicemail with information to call me back.

## 2019-04-24 NOTE — TELEPHONE ENCOUNTER
"ED/Discharge Protocol    \"Hi, my name is Kayy Thurston, a registered nurse, and I am calling on behalf of Dr. Martino's office at [place].  I am calling to follow up and see how things are going for you after your recent visit.\"    \"I see that you were in the (ER/UC/IP) on 4/16/19.    How are you doing now that you are home?\" good    Is patient experiencing symptoms that may require a hospital visit?  no    Discharge Instructions    \"Let's review your discharge instructions.  What is/are the follow-up recommendations?  Pt. Response: f/u with diabetic edu, pcpc    \"Were you instructed to make a follow-up appointment?\"  Pt. Response: Yes.  Has appointment been made?   Yes      \"When you see the provider, I would recommend that you bring your discharge instructions with you.    Medications    \"How many new medications are you on since your hospitalization/ED visit?\"    0-1  \"How many of your current medicines changed (dose, timing, name, etc.) while you were in the hospital/ED visit?\"   0-1  \"Do you have questions about your medications?\"   No  \"Were you newly diagnosed with heart failure, COPD, diabetes or did you have a heart attack?\"   No  For patients on insulin: \"Did you start on insulin in the hospital or did you have your insulin dose changed?\"   No    Medication reconciliation completed? Yes    Was MTM referral placed (*Make sure to put transitions as reason for referral)?   No    Call Summary    \"Do you have any questions or concerns about your condition or care plan at the moment?\"    No  Triage nurse advice given: Pt encouraged to call with questions or concerns.    Patient was in ER 1 in the past year (assess appropriateness of ER visits.)      \"If you have questions or things don't continue to improve, we encourage you contact us through the main clinic number,  [phone].  Even if the clinic is not open, triage nurses are available 24/7 to help you.     We would like you to know that our clinic has " "extended hours (provide information).  We also have urgent care (provide details on closest location and hours/contact info)\"      \"Thank you for your time and take care!\"      "

## 2019-04-29 ENCOUNTER — ALLIED HEALTH/NURSE VISIT (OUTPATIENT)
Dept: EDUCATION SERVICES | Facility: CLINIC | Age: 74
End: 2019-04-29
Payer: COMMERCIAL

## 2019-04-29 ENCOUNTER — TELEPHONE (OUTPATIENT)
Dept: EDUCATION SERVICES | Facility: CLINIC | Age: 74
End: 2019-04-29

## 2019-04-29 DIAGNOSIS — E11.42 TYPE 2 DIABETES MELLITUS WITH DIABETIC POLYNEUROPATHY, WITHOUT LONG-TERM CURRENT USE OF INSULIN (H): Chronic | ICD-10-CM

## 2019-04-29 PROCEDURE — G0108 DIAB MANAGE TRN  PER INDIV: HCPCS | Performed by: DIETITIAN, REGISTERED

## 2019-04-29 RX ORDER — GLIPIZIDE 5 MG/1
5 TABLET, FILM COATED, EXTENDED RELEASE ORAL DAILY
Qty: 90 TABLET | Refills: 1 | Status: SHIPPED | OUTPATIENT
Start: 2019-04-29 | End: 2019-07-10 | Stop reason: ALTCHOICE

## 2019-04-29 NOTE — PROGRESS NOTES
"Diabetes Self-Management Education & Support    Diabetes Education Self Management & Training    SUBJECTIVE/OBJECTIVE:  Presents for: Individual review  Accompanied by: Self  Diabetes education in the past 24mo: No  Focus of Visit: Taking Medication, Healthy Coping, Monitoring  Diabetes type: Type 2  Disease course: Worsening  How confident are you filling out medical forms by yourself:: Not Assessed  Transportation concerns: No  Other concerns:: None  Cultural Influences/Ethnic Background:  American      Diabetes Symptoms & Complications          Patient Problem List and Family Medical History reviewed for relevant medical history, current medical status, and diabetes risk factors.    Vitals:  There were no vitals taken for this visit.  Estimated body mass index is 27.84 kg/m  as calculated from the following:    Height as of 4/15/19: 1.778 m (5' 10\").    Weight as of 4/17/19: 88 kg (194 lb 0.1 oz).   Last 3 BP:   BP Readings from Last 3 Encounters:   04/19/19 120/64   04/15/19 140/66   03/12/19 118/80       History   Smoking Status     Former Smoker     Packs/day: 1.00     Years: 40.00     Types: Cigarettes     Quit date: 11/24/2006   Smokeless Tobacco     Never Used       Labs:  Lab Results   Component Value Date    A1C 11.6 04/16/2019     Lab Results   Component Value Date     04/18/2019     Lab Results   Component Value Date    LDL 90 10/22/2018     HDL Cholesterol   Date Value Ref Range Status   10/22/2018 43 >39 mg/dL Final   ]  GFR Estimate   Date Value Ref Range Status   04/18/2019 78 >60 mL/min/[1.73_m2] Final     Comment:     Non  GFR Calc  Starting 12/18/2018, serum creatinine based estimated GFR (eGFR) will be   calculated using the Chronic Kidney Disease Epidemiology Collaboration   (CKD-EPI) equation.       GFR Estimate If Black   Date Value Ref Range Status   04/18/2019 >90 >60 mL/min/[1.73_m2] Final     Comment:      GFR Calc  Starting 12/18/2018, serum " creatinine based estimated GFR (eGFR) will be   calculated using the Chronic Kidney Disease Epidemiology Collaboration   (CKD-EPI) equation.       Lab Results   Component Value Date    CR 0.97 04/18/2019     No results found for: MICROALBUMIN    Healthy Eating  Healthy Eating Assessed Today: Yes  Cultural/Restorationist diet restrictions?: No  Breakfast: potstickers pork, chicken nuggets  Dinner: hamburgers on bread,   Snacks: not a big snackers, dots pretzels, apple slices  Beverages: Water, Coffee, Diet soda  Has patient met with a dietitian in the past?: No    Being Active  Being Active Assessed Today: Yes  Exercise:: (not able to do much in ice, snow)    Monitoring  Monitoring Assessed Today: (not functioning meter)  Did patient bring glucose meter to appointment? : No                        Poli is still having high  Blood sugars in the am.  Increased the tresiba to 26 units.  I will have him call me in two weeks.    Taking Medications  Diabetes Medication(s)     Insulin       insulin degludec (TRESIBA) 200 UNIT/ML pen    Take 26 units at bedtime (increase by 2 units every 4 days until am readings are under 120) max dose: 32.    Sulfonylureas       glipiZIDE (GLUCOTROL XL) 5 MG 24 hr tablet    Take 1 tablet (5 mg) by mouth daily               Problem Solving     Not assessed            Reducing Risks   not assessed    Healthy Coping     Patient Activation Measure Survey Score:  RIDDHI Score (Last Two) 2/15/2011   RIDDHI Raw Score 41   Activation Score 63.2   RIDDHI Level 3       ASSESSMENT:  Still running higher am readings,  Increased tresiba.  GLipzide was not added back in the med list but pt has been taking it, note sent to Dr Davidson about adding it back in.  DIscussed glucose tablets and how to use.          Patient's most recent   Lab Results   Component Value Date    A1C 11.6 04/16/2019    is not meeting goal of <8.0    INTERVENTION:   Diabetes knowledge and skills assessment:     Patient is knowledgeable in diabetes  management concepts related to: Healthy Eating, Being Active, Monitoring and Taking Medication    Patient needs further education on the following diabetes management concepts: Healthy Eating, Being Active, Monitoring, Taking Medication, Problem Solving, Reducing Risks and Healthy Coping    Based on learning assessment above, most appropriate setting for further diabetes education would be: Individual setting.    Education provided today on:  AADE Self-Care Behaviors:  Healthy Eating: consistency in amount, composition, and timing of food intake  Being Active: relationship to blood glucose  Taking Medication: action of prescribed medication and when to take medications  How to treat a low   Opportunities for ongoing education and support in diabetes-self management were discussed.    Pt verbalized understanding of concepts discussed and recommendations provided today.           PLAN:  See Patient Instructions for co-developed, patient-stated behavior change goals.  AVS printed and provided to patient today. See Follow-Up section for recommended follow-up.      Time Spent: 30 minutes  Encounter Type: Individual    Any diabetes medication dose changes were made via the CDE Protocol and Collaborative Practice Agreement with the patient's primary care provider. A copy of this encounter was shared with the provider.

## 2019-04-29 NOTE — PATIENT INSTRUCTIONS
1.  Increase the Tresiba to 26 units at bedtime.    2.  We want to get the am readings to under 130.    3.   Call me or email me with your blood sugars in one week.  [email or web address] or [phone].     4.  Buy some glucose tablets for low blood sugars, need to chew 3-4 for a low.

## 2019-04-29 NOTE — TELEPHONE ENCOUNTER
Dr. Martino,    The glipizide was not added back in when he left the hospital.  Poli stated he has still been taking it, I added it back into the plan.  PLease let me know if that is ok.    Ruthann Alaniz, OSCAR on 4/29/2019 at 9:59 AM

## 2019-05-02 ENCOUNTER — ANTICOAGULATION THERAPY VISIT (OUTPATIENT)
Dept: ANTICOAGULATION | Facility: CLINIC | Age: 74
End: 2019-05-02
Payer: COMMERCIAL

## 2019-05-02 DIAGNOSIS — I48.20 CHRONIC ATRIAL FIBRILLATION (H): ICD-10-CM

## 2019-05-02 DIAGNOSIS — Z79.01 LONG TERM CURRENT USE OF ANTICOAGULANT THERAPY: ICD-10-CM

## 2019-05-02 LAB — INR POINT OF CARE: 3 (ref 0.86–1.14)

## 2019-05-02 PROCEDURE — 99207 ZZC NO CHARGE NURSE ONLY: CPT

## 2019-05-02 PROCEDURE — 36416 COLLJ CAPILLARY BLOOD SPEC: CPT

## 2019-05-02 PROCEDURE — 85610 PROTHROMBIN TIME: CPT | Mod: QW

## 2019-05-02 NOTE — PROGRESS NOTES
ANTICOAGULATION FOLLOW-UP CLINIC VISIT    Patient Name:  Antoine Russo  Date:  5/2/2019  Contact Type:      SUBJECTIVE:     Patient Findings     Comments:   No longer on abx. No changes in diet, activity level, medications (including over the counter), or health. No missed doses of warfarin. Patient took dosing as prescribed. No signs of clots or bleeding concerns. Patient will continue maintenance warfarin dosing.             OBJECTIVE    INR Protime   Date Value Ref Range Status   05/02/2019 3.0 (A) 0.86 - 1.14 Final       ASSESSMENT / PLAN  INR assessment THER    Recheck INR In: 4 WEEKS    INR Location Clinic      Anticoagulation Summary  As of 5/2/2019    INR goal:   2.0-3.0   TTR:   65.9 % (10.5 mo)   INR used for dosing:   3.0 (5/2/2019)   Warfarin maintenance plan:   2.5 mg (2.5 mg x 1) every day   Full warfarin instructions:   2.5 mg every day   Weekly warfarin total:   17.5 mg   No change documented:   Aparna Kunz RN   Plan last modified:   Angela Harrell RN (6/21/2018)   Next INR check:   5/30/2019   Priority:   INR   Target end date:   Indefinite    Indications    Chronic atrial fibrillation (H) [I48.2]  Long term current use of anticoagulant therapy [Z79.01]             Anticoagulation Episode Summary     INR check location:       Preferred lab:       Send INR reminders to:   NB ANTICO CLINIC POOL    Comments:   * 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

## 2019-05-06 ENCOUNTER — OFFICE VISIT (OUTPATIENT)
Dept: FAMILY MEDICINE | Facility: CLINIC | Age: 74
End: 2019-05-06
Payer: COMMERCIAL

## 2019-05-06 VITALS
RESPIRATION RATE: 16 BRPM | HEART RATE: 80 BPM | DIASTOLIC BLOOD PRESSURE: 66 MMHG | SYSTOLIC BLOOD PRESSURE: 128 MMHG | OXYGEN SATURATION: 97 % | WEIGHT: 197 LBS | HEIGHT: 70 IN | TEMPERATURE: 97.5 F | BODY MASS INDEX: 28.2 KG/M2

## 2019-05-06 DIAGNOSIS — I10 HYPERTENSION, BENIGN ESSENTIAL, GOAL BELOW 140/90: ICD-10-CM

## 2019-05-06 DIAGNOSIS — E11.42 TYPE 2 DIABETES MELLITUS WITH DIABETIC POLYNEUROPATHY, WITHOUT LONG-TERM CURRENT USE OF INSULIN (H): ICD-10-CM

## 2019-05-06 DIAGNOSIS — Z79.01 LONG TERM CURRENT USE OF ANTICOAGULANT THERAPY: ICD-10-CM

## 2019-05-06 PROCEDURE — 99214 OFFICE O/P EST MOD 30 MIN: CPT | Performed by: FAMILY MEDICINE

## 2019-05-06 ASSESSMENT — MIFFLIN-ST. JEOR: SCORE: 1654.84

## 2019-05-06 NOTE — PROGRESS NOTES
SUBJECTIVE:   Antoine Russo is a 73 year old male who presents to clinic today for the following   health issues:        Hospital Follow-up Visit:    Hospital/Nursing Home/IP Rehab Facility: [facility]  Date of Admission: 04/16/2019  Date of Discharge: 04/19/2019   Reason(s) for Admission: Acute on chronic pancreatitis             Problems taking medications regularly:  None       Medication changes since discharge: Finished course of Keflex       Problems adhering to non-medication therapy:  None    Summary of hospitalization:  [facility] discharge summary reviewed  Diagnostic Tests/Treatments reviewed.  Follow up needed: none  Other Healthcare Providers Involved in Patient s Care:         None  Update since discharge: improved.     Post Discharge Medication Reconciliation: discharge medications reconciled and changed, per note/orders (see AVS).  Plan of care communicated with patient     Coding guidelines for this visit:  Type of Medical   Decision Making Face-to-Face Visit       within 7 Days of discharge Face-to-Face Visit        within 14 days of discharge   Moderate Complexity [id number]   High Complexity [id number]          He was hospitalized for recurrent pancreatitis. Pain has resolved and he is doing well.     Diabetes-he is working with Ruthann to get his numbers down, is on insulin. His glipizide was stopped after hospital but they just restarted it.     Lab Results   Component Value Date    A1C 11.6 04/16/2019    A1C 13.0 03/12/2019    A1C 7.6 11/23/2018    A1C 6.9 10/22/2018    A1C 7.1 08/23/2018        Additional history: as documented    Reviewed  and updated as needed this visit by clinical staff         Reviewed and updated as needed this visit by Provider         BP Readings from Last 3 Encounters:   05/06/19 128/66   04/19/19 120/64   04/15/19 140/66    Wt Readings from Last 3 Encounters:   05/06/19 89.4 kg (197 lb)   04/17/19 88 kg (194 lb 0.1 oz)   04/15/19 89 kg (196  "lb 3.2 oz)           ROS:  CONSTITUTIONAL: NEGATIVE for fever, chills, change in weight  ENT/MOUTH: NEGATIVE for ear, mouth and throat problems  RESP: NEGATIVE for significant cough or SOB  CV: NEGATIVE for chest pain, palpitations or peripheral edema  GI: NEGATIVE for nausea, abdominal pain, heartburn, or change in bowel habits  : negative for dysuria, hematuria, decreased urinary stream    OBJECTIVE:                                                    /66 (BP Location: Right arm, Patient Position: Chair, Cuff Size: Adult Regular)   Pulse 80   Temp 97.5  F (36.4  C) (Tympanic)   Resp 16   Ht 1.778 m (5' 10\")   Wt 89.4 kg (197 lb)   SpO2 97%   BMI 28.27 kg/m    Body mass index is 28.27 kg/m .  GENERAL: healthy, alert, well nourished, well hydrated, no distress  NECK: no tenderness, no adenopathy, no asymmetry, no masses, no stiffness; thyroid- normal to palpation  RESP: lungs clear to auscultation - no rales, no rhonchi, no wheezes  CV: regular rates and rhythm, normal S1 S2, no S3 or S4 and no murmur, no click or rub -  ABDOMEN: soft, no tenderness, no  hepatosplenomegaly, no masses, normal bowel sounds       ASSESSMENT/PLAN:                                                      ASSESSMENT:  1. Pancreatitis, recurrent (H)    2. Hypertension, benign essential, goal below 140/90    3. Type 2 diabetes mellitus with diabetic polyneuropathy, without long-term current use of insulin (H)    4. Long term current use of anticoagulant therapy        PLAN:  No orders of the defined types were placed in this encounter.      Patient Instructions   See you back in July for diabetes, earlier if needed     Katie Gross MD  [facility]      "

## 2019-05-17 ENCOUNTER — TELEPHONE (OUTPATIENT)
Dept: EDUCATION SERVICES | Facility: CLINIC | Age: 74
End: 2019-05-17

## 2019-05-17 DIAGNOSIS — E11.42 TYPE 2 DIABETES MELLITUS WITH DIABETIC POLYNEUROPATHY, WITHOUT LONG-TERM CURRENT USE OF INSULIN (H): Chronic | ICD-10-CM

## 2019-05-17 NOTE — TELEPHONE ENCOUNTER
Diabetes education contact:    Currently doing 30 units of tresiba.    Woo[employer]oo, didn't forget any this week... Readings are: 5-9 am 235 pm 194, 5-10 am219 pm 150, 5-11 am 254 pm 156, 5-12 am (after b-fast 302 pm 188, 5-13 am 228 pm 164, 5-14 am 244 pm 143, 5-15 am 248 pm 102...Have bee taking 30 units of Tresiba in the evening. Have a nice day....Poli    Go up to 36 units of tresiba, would like to see under 130 for am readings so we will keep working on dose increases.  Enjoy the beautiful day! Ruthann Alaniz RD CDE    Any diabetes medication dose changes were made via the CDE Protocol and Collaborative Practice Agreement with the patient's primary care provider. A copy of this encounter was shared with the provider.

## 2019-05-22 ENCOUNTER — PATIENT OUTREACH (OUTPATIENT)
Dept: CARE COORDINATION | Facility: CLINIC | Age: 74
End: 2019-05-22

## 2019-05-22 ENCOUNTER — HOSPITAL ENCOUNTER (INPATIENT)
Facility: CLINIC | Age: 74
LOS: 2 days | Discharge: HOME | End: 2019-05-24
Attending: EMERGENCY MEDICINE | Admitting: INTERNAL MEDICINE
Payer: COMMERCIAL

## 2019-05-22 ENCOUNTER — APPOINTMENT (OUTPATIENT)
Dept: CT IMAGING | Facility: CLINIC | Age: 74
End: 2019-05-22
Attending: PHYSICIAN ASSISTANT
Payer: COMMERCIAL

## 2019-05-22 DIAGNOSIS — K85.90 ACUTE PANCREATITIS, UNSPECIFIED COMPLICATION STATUS, UNSPECIFIED PANCREATITIS TYPE: ICD-10-CM

## 2019-05-22 DIAGNOSIS — R79.1 ABNORMAL COAGULATION PROFILE: ICD-10-CM

## 2019-05-22 DIAGNOSIS — K85.20 ALCOHOL-INDUCED ACUTE PANCREATITIS WITHOUT INFECTION OR NECROSIS: Primary | ICD-10-CM

## 2019-05-22 PROBLEM — K86.1 ACUTE ON CHRONIC PANCREATITIS (H): Status: RESOLVED | Noted: 2018-01-23 | Resolved: 2019-05-22

## 2019-05-22 PROBLEM — R82.81 BACTERIURIA WITH PYURIA: Status: RESOLVED | Noted: 2019-04-17 | Resolved: 2019-05-22

## 2019-05-22 PROBLEM — R82.71 BACTERIURIA WITH PYURIA: Status: RESOLVED | Noted: 2019-04-17 | Resolved: 2019-05-22

## 2019-05-22 PROBLEM — D72.823 LEUKEMOID REACTION: Status: RESOLVED | Noted: 2019-04-17 | Resolved: 2019-05-22

## 2019-05-22 PROBLEM — N17.9 ACUTE KIDNEY INJURY (H): Status: RESOLVED | Noted: 2019-04-17 | Resolved: 2019-05-22

## 2019-05-22 LAB
ALBUMIN SERPL-MCNC: 3.3 G/DL (ref 3.4–5)
ALBUMIN UR-MCNC: 100 MG/DL
ALP SERPL-CCNC: 64 U/L (ref 40–150)
ALT SERPL W P-5'-P-CCNC: 25 U/L (ref 0–70)
ANION GAP SERPL CALCULATED.3IONS-SCNC: 3 MMOL/L (ref 3–14)
APPEARANCE UR: ABNORMAL
AST SERPL W P-5'-P-CCNC: 21 U/L (ref 0–45)
BACTERIA #/AREA URNS HPF: ABNORMAL /HPF
BASOPHILS # BLD AUTO: 0 10E9/L (ref 0–0.2)
BASOPHILS NFR BLD AUTO: 0.3 %
BILIRUB SERPL-MCNC: 0.6 MG/DL (ref 0.2–1.3)
BILIRUB UR QL STRIP: NEGATIVE
BUN SERPL-MCNC: 20 MG/DL (ref 7–30)
CALCIUM SERPL-MCNC: 9.8 MG/DL (ref 8.5–10.1)
CHLORIDE SERPL-SCNC: 99 MMOL/L (ref 94–109)
CHOLEST SERPL-MCNC: 151 MG/DL
CO2 SERPL-SCNC: 31 MMOL/L (ref 20–32)
COLOR UR AUTO: YELLOW
CREAT SERPL-MCNC: 1.09 MG/DL (ref 0.66–1.25)
DIFFERENTIAL METHOD BLD: ABNORMAL
EOSINOPHIL # BLD AUTO: 0.3 10E9/L (ref 0–0.7)
EOSINOPHIL NFR BLD AUTO: 2.5 %
ERYTHROCYTE [DISTWIDTH] IN BLOOD BY AUTOMATED COUNT: 16 % (ref 10–15)
ETHANOL SERPL-MCNC: <0.01 G/DL
GFR SERPL CREATININE-BSD FRML MDRD: 68 ML/MIN/{1.73_M2}
GLUCOSE BLDC GLUCOMTR-MCNC: 136 MG/DL (ref 70–99)
GLUCOSE SERPL-MCNC: 265 MG/DL (ref 70–99)
GLUCOSE UR STRIP-MCNC: NEGATIVE MG/DL
HCT VFR BLD AUTO: 46 % (ref 40–53)
HDLC SERPL-MCNC: 31 MG/DL
HGB BLD-MCNC: 14.2 G/DL (ref 13.3–17.7)
HGB UR QL STRIP: ABNORMAL
IMM GRANULOCYTES # BLD: 0.1 10E9/L (ref 0–0.4)
IMM GRANULOCYTES NFR BLD: 0.5 %
INR PPP: 2.6 (ref 0.86–1.14)
KETONES UR STRIP-MCNC: NEGATIVE MG/DL
LACTATE BLD-SCNC: 1.2 MMOL/L (ref 0.7–2)
LDLC SERPL CALC-MCNC: 82 MG/DL
LEUKOCYTE ESTERASE UR QL STRIP: ABNORMAL
LIPASE SERPL-CCNC: 699 U/L (ref 73–393)
LYMPHOCYTES # BLD AUTO: 2.9 10E9/L (ref 0.8–5.3)
LYMPHOCYTES NFR BLD AUTO: 24.7 %
MCH RBC QN AUTO: 29.3 PG (ref 26.5–33)
MCHC RBC AUTO-ENTMCNC: 30.9 G/DL (ref 31.5–36.5)
MCV RBC AUTO: 95 FL (ref 78–100)
MONOCYTES # BLD AUTO: 0.7 10E9/L (ref 0–1.3)
MONOCYTES NFR BLD AUTO: 5.7 %
NEUTROPHILS # BLD AUTO: 7.8 10E9/L (ref 1.6–8.3)
NEUTROPHILS NFR BLD AUTO: 66.3 %
NITRATE UR QL: NEGATIVE
NONHDLC SERPL-MCNC: 120 MG/DL
NRBC # BLD AUTO: 0 10*3/UL
NRBC BLD AUTO-RTO: 0 /100
PH UR STRIP: 8 PH (ref 5–7)
PLATELET # BLD AUTO: 313 10E9/L (ref 150–450)
POTASSIUM SERPL-SCNC: 4.5 MMOL/L (ref 3.4–5.3)
PROT SERPL-MCNC: 7.9 G/DL (ref 6.8–8.8)
RBC # BLD AUTO: 4.84 10E12/L (ref 4.4–5.9)
RBC #/AREA URNS AUTO: 20 /HPF (ref 0–2)
SODIUM SERPL-SCNC: 133 MMOL/L (ref 133–144)
SOURCE: ABNORMAL
SP GR UR STRIP: 1.04 (ref 1–1.03)
TRIGL SERPL-MCNC: 188 MG/DL
UROBILINOGEN UR STRIP-MCNC: 0 MG/DL (ref 0–2)
WBC # BLD AUTO: 11.8 10E9/L (ref 4–11)
WBC #/AREA URNS AUTO: >182 /HPF (ref 0–5)
WBC CLUMPS #/AREA URNS HPF: PRESENT /HPF

## 2019-05-22 PROCEDURE — 99223 1ST HOSP IP/OBS HIGH 75: CPT | Mod: AI | Performed by: INTERNAL MEDICINE

## 2019-05-22 PROCEDURE — 74177 CT ABD & PELVIS W/CONTRAST: CPT

## 2019-05-22 PROCEDURE — 25000132 ZZH RX MED GY IP 250 OP 250 PS 637: Performed by: PHYSICIAN ASSISTANT

## 2019-05-22 PROCEDURE — 25000128 H RX IP 250 OP 636: Performed by: RADIOLOGY

## 2019-05-22 PROCEDURE — C9113 INJ PANTOPRAZOLE SODIUM, VIA: HCPCS | Performed by: INTERNAL MEDICINE

## 2019-05-22 PROCEDURE — 96361 HYDRATE IV INFUSION ADD-ON: CPT | Performed by: EMERGENCY MEDICINE

## 2019-05-22 PROCEDURE — 99285 EMERGENCY DEPT VISIT HI MDM: CPT | Mod: Z6 | Performed by: EMERGENCY MEDICINE

## 2019-05-22 PROCEDURE — 25000125 ZZHC RX 250: Performed by: RADIOLOGY

## 2019-05-22 PROCEDURE — 81001 URINALYSIS AUTO W/SCOPE: CPT | Performed by: INTERNAL MEDICINE

## 2019-05-22 PROCEDURE — 25000128 H RX IP 250 OP 636: Performed by: INTERNAL MEDICINE

## 2019-05-22 PROCEDURE — 00000146 ZZHCL STATISTIC GLUCOSE BY METER IP

## 2019-05-22 PROCEDURE — 96374 THER/PROPH/DIAG INJ IV PUSH: CPT | Performed by: EMERGENCY MEDICINE

## 2019-05-22 PROCEDURE — 25800030 ZZH RX IP 258 OP 636: Performed by: EMERGENCY MEDICINE

## 2019-05-22 PROCEDURE — 85025 COMPLETE CBC W/AUTO DIFF WBC: CPT | Performed by: EMERGENCY MEDICINE

## 2019-05-22 PROCEDURE — 87086 URINE CULTURE/COLONY COUNT: CPT | Performed by: FAMILY MEDICINE

## 2019-05-22 PROCEDURE — 80053 COMPREHEN METABOLIC PANEL: CPT | Performed by: EMERGENCY MEDICINE

## 2019-05-22 PROCEDURE — 85610 PROTHROMBIN TIME: CPT | Performed by: EMERGENCY MEDICINE

## 2019-05-22 PROCEDURE — 83690 ASSAY OF LIPASE: CPT | Performed by: EMERGENCY MEDICINE

## 2019-05-22 PROCEDURE — 36415 COLL VENOUS BLD VENIPUNCTURE: CPT | Performed by: PHYSICIAN ASSISTANT

## 2019-05-22 PROCEDURE — 25000128 H RX IP 250 OP 636: Performed by: FAMILY MEDICINE

## 2019-05-22 PROCEDURE — 80061 LIPID PANEL: CPT | Performed by: PHYSICIAN ASSISTANT

## 2019-05-22 PROCEDURE — 25000128 H RX IP 250 OP 636: Performed by: EMERGENCY MEDICINE

## 2019-05-22 PROCEDURE — 12000000 ZZH R&B MED SURG/OB

## 2019-05-22 PROCEDURE — 83605 ASSAY OF LACTIC ACID: CPT | Performed by: EMERGENCY MEDICINE

## 2019-05-22 PROCEDURE — 99285 EMERGENCY DEPT VISIT HI MDM: CPT | Mod: 25 | Performed by: EMERGENCY MEDICINE

## 2019-05-22 PROCEDURE — 80320 DRUG SCREEN QUANTALCOHOLS: CPT | Performed by: EMERGENCY MEDICINE

## 2019-05-22 PROCEDURE — 99207 ZZC APP CREDIT; MD BILLING SHARED VISIT: CPT | Performed by: PHYSICIAN ASSISTANT

## 2019-05-22 RX ORDER — HYDROMORPHONE HYDROCHLORIDE 1 MG/ML
0.5 INJECTION, SOLUTION INTRAMUSCULAR; INTRAVENOUS; SUBCUTANEOUS
Status: DISCONTINUED | OUTPATIENT
Start: 2019-05-22 | End: 2019-05-22

## 2019-05-22 RX ORDER — LISINOPRIL 5 MG/1
5 TABLET ORAL DAILY
Status: DISCONTINUED | OUTPATIENT
Start: 2019-05-22 | End: 2019-05-24 | Stop reason: HOSPADM

## 2019-05-22 RX ORDER — ONDANSETRON 4 MG/1
4 TABLET, ORALLY DISINTEGRATING ORAL EVERY 6 HOURS PRN
Status: DISCONTINUED | OUTPATIENT
Start: 2019-05-22 | End: 2019-05-24 | Stop reason: HOSPADM

## 2019-05-22 RX ORDER — SODIUM CHLORIDE, SODIUM LACTATE, POTASSIUM CHLORIDE, CALCIUM CHLORIDE 600; 310; 30; 20 MG/100ML; MG/100ML; MG/100ML; MG/100ML
1000 INJECTION, SOLUTION INTRAVENOUS CONTINUOUS
Status: DISCONTINUED | OUTPATIENT
Start: 2019-05-22 | End: 2019-05-24 | Stop reason: HOSPADM

## 2019-05-22 RX ORDER — GABAPENTIN 300 MG/1
900 CAPSULE ORAL AT BEDTIME
Status: DISCONTINUED | OUTPATIENT
Start: 2019-05-22 | End: 2019-05-24 | Stop reason: HOSPADM

## 2019-05-22 RX ORDER — CEFTRIAXONE SODIUM 1 G/50ML
1 INJECTION, SOLUTION INTRAVENOUS EVERY 24 HOURS
Status: DISCONTINUED | OUTPATIENT
Start: 2019-05-22 | End: 2019-05-24 | Stop reason: HOSPADM

## 2019-05-22 RX ORDER — ONDANSETRON 2 MG/ML
4 INJECTION INTRAMUSCULAR; INTRAVENOUS EVERY 6 HOURS PRN
Status: DISCONTINUED | OUTPATIENT
Start: 2019-05-22 | End: 2019-05-24 | Stop reason: HOSPADM

## 2019-05-22 RX ORDER — WARFARIN SODIUM 2.5 MG/1
2.5 TABLET ORAL
Status: DISCONTINUED | OUTPATIENT
Start: 2019-05-22 | End: 2019-05-22

## 2019-05-22 RX ORDER — IOPAMIDOL 755 MG/ML
97 INJECTION, SOLUTION INTRAVASCULAR ONCE
Status: COMPLETED | OUTPATIENT
Start: 2019-05-22 | End: 2019-05-22

## 2019-05-22 RX ORDER — TAMSULOSIN HYDROCHLORIDE 0.4 MG/1
0.4 CAPSULE ORAL DAILY
Status: DISCONTINUED | OUTPATIENT
Start: 2019-05-22 | End: 2019-05-24 | Stop reason: HOSPADM

## 2019-05-22 RX ORDER — NICOTINE POLACRILEX 4 MG
15-30 LOZENGE BUCCAL
Status: DISCONTINUED | OUTPATIENT
Start: 2019-05-22 | End: 2019-05-23

## 2019-05-22 RX ORDER — HYDROMORPHONE HYDROCHLORIDE 1 MG/ML
.3-.5 INJECTION, SOLUTION INTRAMUSCULAR; INTRAVENOUS; SUBCUTANEOUS
Status: DISCONTINUED | OUTPATIENT
Start: 2019-05-22 | End: 2019-05-24 | Stop reason: HOSPADM

## 2019-05-22 RX ORDER — PROCHLORPERAZINE 25 MG
12.5 SUPPOSITORY, RECTAL RECTAL EVERY 12 HOURS PRN
Status: DISCONTINUED | OUTPATIENT
Start: 2019-05-22 | End: 2019-05-24 | Stop reason: HOSPADM

## 2019-05-22 RX ORDER — GABAPENTIN 600 MG/1
600 TABLET ORAL 2 TIMES DAILY
Status: DISCONTINUED | OUTPATIENT
Start: 2019-05-22 | End: 2019-05-24 | Stop reason: HOSPADM

## 2019-05-22 RX ORDER — PROCHLORPERAZINE MALEATE 5 MG
5 TABLET ORAL EVERY 6 HOURS PRN
Status: DISCONTINUED | OUTPATIENT
Start: 2019-05-22 | End: 2019-05-24 | Stop reason: HOSPADM

## 2019-05-22 RX ORDER — NALOXONE HYDROCHLORIDE 0.4 MG/ML
.1-.4 INJECTION, SOLUTION INTRAMUSCULAR; INTRAVENOUS; SUBCUTANEOUS
Status: DISCONTINUED | OUTPATIENT
Start: 2019-05-22 | End: 2019-05-24 | Stop reason: HOSPADM

## 2019-05-22 RX ORDER — DEXTROSE MONOHYDRATE 25 G/50ML
25-50 INJECTION, SOLUTION INTRAVENOUS
Status: DISCONTINUED | OUTPATIENT
Start: 2019-05-22 | End: 2019-05-23

## 2019-05-22 RX ADMIN — LISINOPRIL 5 MG: 5 TABLET ORAL at 09:59

## 2019-05-22 RX ADMIN — ONDANSETRON 4 MG: 2 INJECTION INTRAMUSCULAR; INTRAVENOUS at 19:52

## 2019-05-22 RX ADMIN — Medication 0.5 MG: at 10:04

## 2019-05-22 RX ADMIN — METOPROLOL SUCCINATE 75 MG: 50 TABLET, EXTENDED RELEASE ORAL at 09:59

## 2019-05-22 RX ADMIN — GABAPENTIN 600 MG: 600 TABLET, FILM COATED ORAL at 14:52

## 2019-05-22 RX ADMIN — Medication 0.5 MG: at 05:04

## 2019-05-22 RX ADMIN — SODIUM CHLORIDE, POTASSIUM CHLORIDE, SODIUM LACTATE AND CALCIUM CHLORIDE 1000 ML: 600; 310; 30; 20 INJECTION, SOLUTION INTRAVENOUS at 05:04

## 2019-05-22 RX ADMIN — IOPAMIDOL 97 ML: 755 INJECTION, SOLUTION INTRAVENOUS at 13:21

## 2019-05-22 RX ADMIN — METOPROLOL SUCCINATE 75 MG: 50 TABLET, EXTENDED RELEASE ORAL at 19:46

## 2019-05-22 RX ADMIN — PANTOPRAZOLE SODIUM 40 MG: 40 INJECTION, POWDER, FOR SOLUTION INTRAVENOUS at 19:46

## 2019-05-22 RX ADMIN — TAMSULOSIN HYDROCHLORIDE 0.4 MG: 0.4 CAPSULE ORAL at 09:59

## 2019-05-22 RX ADMIN — SODIUM CHLORIDE, POTASSIUM CHLORIDE, SODIUM LACTATE AND CALCIUM CHLORIDE 1000 ML: 600; 310; 30; 20 INJECTION, SOLUTION INTRAVENOUS at 06:43

## 2019-05-22 RX ADMIN — CEFTRIAXONE SODIUM 1 G: 1 INJECTION, SOLUTION INTRAVENOUS at 19:46

## 2019-05-22 RX ADMIN — SODIUM CHLORIDE, POTASSIUM CHLORIDE, SODIUM LACTATE AND CALCIUM CHLORIDE 1000 ML: 600; 310; 30; 20 INJECTION, SOLUTION INTRAVENOUS at 05:38

## 2019-05-22 RX ADMIN — INSULIN DEGLUDEC INJECTION 36 UNITS: 200 INJECTION, SOLUTION SUBCUTANEOUS at 21:30

## 2019-05-22 RX ADMIN — SODIUM CHLORIDE 64 ML: 9 INJECTION, SOLUTION INTRAVENOUS at 13:21

## 2019-05-22 RX ADMIN — Medication 0.5 MG: at 19:52

## 2019-05-22 RX ADMIN — ONDANSETRON 4 MG: 2 INJECTION INTRAMUSCULAR; INTRAVENOUS at 11:10

## 2019-05-22 RX ADMIN — SODIUM CHLORIDE, POTASSIUM CHLORIDE, SODIUM LACTATE AND CALCIUM CHLORIDE 1000 ML: 600; 310; 30; 20 INJECTION, SOLUTION INTRAVENOUS at 16:43

## 2019-05-22 RX ADMIN — GABAPENTIN 600 MG: 600 TABLET, FILM COATED ORAL at 09:59

## 2019-05-22 RX ADMIN — GABAPENTIN 900 MG: 300 CAPSULE ORAL at 21:30

## 2019-05-22 ASSESSMENT — ENCOUNTER SYMPTOMS
CHILLS: 0
ABDOMINAL PAIN: 1
LIGHT-HEADEDNESS: 0
WEAKNESS: 0
FLANK PAIN: 0
ABDOMINAL DISTENTION: 0
FATIGUE: 0
BACK PAIN: 0
HEADACHES: 0
NAUSEA: 0
CONSTIPATION: 0
DIARRHEA: 0
VOMITING: 0
DYSURIA: 0
FREQUENCY: 0
FEVER: 0
SHORTNESS OF BREATH: 0

## 2019-05-22 ASSESSMENT — ACTIVITIES OF DAILY LIVING (ADL)
ADLS_ACUITY_SCORE: 11

## 2019-05-22 ASSESSMENT — MIFFLIN-ST. JEOR
SCORE: 1623.08
SCORE: 1662.25

## 2019-05-22 NOTE — PLAN OF CARE
"[place] [facility] ADMISSION NOTE    Patient admitted to room 2401 at approximately 0615 via wheel chair from emergency room. Patient was accompanied by transport tech.     Verbal SBAR report received from Corine Amaral prior to patient arrival.     Patient ambulated to bed independently. Patient alert and oriented X 3. The patient is not having any pain. 0-10 Pain Scale: 8. Admission vital signs: Blood pressure 163/78, pulse 68, temperature 97.4  F (36.3  C), temperature source Oral, resp. rate 16, height 1.778 m (5' 10\"), weight 90.1 kg (198 lb 10.2 oz), SpO2 96 %. Patient was oriented to plan of care, call light, bed controls, tv, telephone, bathroom and visiting hours.     Risk Assessment    The following safety risks were identified during admission: none. Yellow risk band applied: NO.     Skin Initial Assessment    This writer admitted this patient and completed a full skin assessment and Romain score in the Adult PCS flowsheet. Appropriate interventions initiated as needed.     Secondary skin check completed by Brenden DO Rn.         Kayy Kuhn"

## 2019-05-22 NOTE — PROGRESS NOTES
On call note--    UA shows significant pyuria.  CT-Prominence of the urinary bladder wall which may be related to lack  of distention but is nonspecific.  WBC is elevated.  Pt currently afebrile.    Therefore will start rocephin for UTI.  UC has been sent

## 2019-05-22 NOTE — ED TRIAGE NOTES
Pt states mid abdominal pain since Monday. States is worse now. Reports a history of pancreatitis. Pt states he hasn't had ETOH since Thanksgiving. Denies N/V/D. Denies fever.

## 2019-05-22 NOTE — PROGRESS NOTES
Awaiting CT results. Urine starting to get cloudy with a lot of sediment in it. Naun DEMPSEY notified, will send UA with next void.

## 2019-05-22 NOTE — ED PROVIDER NOTES
"  History     Chief Complaint   Patient presents with     Abdominal Pain     Since Monday and wose today. Denies N/V/D.     HPI  Antoine Russo is a 73 year old male with a history of recurrent alcoholic pancreatitis presenting for evaluation of recurrent epigastric abdominal pain similar to his previous episodes of pancreatitis.  Patient reports symptoms began Monday (2 days ago).  Symptoms were initially mild but have progressively worsened.  He reports he is been drinking plenty of water but has had increasing difficulty with any foods.  No associated nausea or vomiting.  No diarrhea.  Denies any chest pain or difficulty breathing.  No rashes or injury.  Denies any alcohol use since Thanksgiving 2018.  Denies fever or chills.    ==================================================================    CHART REVIEW:      Hospital discharge summary from 4/19/19      Hospital Course     Antoine Russo was admitted on 4/16/2019.  The following problems were addressed during his hospitalization:     1.  Acute on chronic pancreatitis, in a patient with history of recurrent alcoholic pancreatitis.  Patient describes no EtOH use since 11/2018.  Presented with epigastric pain of 24 hours duration, mild nausea, no emesis, no diarrhea.  Abdominal imaging not performed on admission, though pain was described as similar to previous episodes of pancreatitis.  Exam was notable for a tender but not tense abdomen.  Lipase initially 853.   RUQ ultrasound performed today to exclude obstructive etiology of pancreatitis shows only a pseudocyst, which he has had before, and is otherwise unremarkable.  Pain has gradually improved since admission, and this morning is essentially resolved.  Has required no oxycodone since yesterday.  He is tolerating a general diet well, had a \"normal\" BM last night.  He feels well, and requests discharge.  - Discharge home today.     2.  Diabetes mellitus.  Poorly controlled as outpatient, Hgb A1c " 11.6 on 4/16/19.  Managed as outpatient with glipizide, insulin degludec, and lisinopril.   Was previously on metformin, which was stopped due to diarrhea.  Glipizide was held at admission.  Glucose control here has been good on degludec and sliding scale, 85 to 168.  - No indication to restart glipizide at this point, but could be considered if hyperglycemia recurs after discharge.  - Continue insulin degludec 22 units at bedtime.     3.  E coli cystitis.  Pyuria and bacteriuria noted on admission UA, culture has subsequently grown E coli.  Was started on empiric CTAX, which was then changed to cephalexin PO on 4/18/19 which he is tolerating well.  Has no urinary symptoms.  - Continue cephalexin 500 mg TID through 4/20/19.     4.  Acute kidney injury.  Baseline GFR 78 to 88.  Admission GFR 58, probably due to hypovolemia as a result of pancreatitis.  GFR 78 after IVF, back to baseline range.     5.  Leukocytosis.  Due to pancreatitis vs pyuria vs both.  WBC 17.6 on admission --> 12.4 on 4/18/19.  - Improved with antibiotic therapy and improvements in pancreatitis.     6.  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  Managed with gabapentin as outpatient.  Symptoms currently at baseline.  - Continue outpatient gabapentin.      7.  Hypertension.  Outpatient meds include lisinopril and metoprolol.  BP control here has been good.  - Continue preadmission lisinopril and metoprolol.     8.  Chronic atrial fibrillation.  Apparently this is paroxysmal, as he did not appear to be in a-fib on admission, and was not in it 11/2018 when he had an EKG.  Preadmission was on metoprolol far rate control, and warfarin anticoagulation.  Rate control here has been good.    - Continue preadmission metoprolol.  - Continue warfarin, Anticoagulation Clinic will resume management of dosing at discharge.           ABDOMINAL ULTRASOUND  4/18/2019 9:40 AM      HISTORY:  Pancreatitis.     COMPARISON: CT 11/24/2018     FINDINGS:    Gallbladder:  Normal with  no cholelithiasis, wall thickening or focal  tenderness.       Bile ducts:   CHD is normal diameter.  No intrahepatic biliary  dilatation.     Liver:   Normal.      Pancreas:  There is a complex cystic structure associated with the  head of the pancreas that measures 3.9 x 9.5 x 6.3 cm. No internal  vascularity. No significant wall thickening.     Spleen:   Normal.      Right kidney:  2.1 cm cyst noted at the upper pole of the right  kidney. A 5.4 cm cyst is noted at the lower pole of the right kidney.     Left kidney:   Normal.     Aorta and IVC:   Normal.                                                                       IMPRESSION:  Complex cystic structure associated with the head of the  pancreas in a patient with prior pancreatitis is compatible with a  pseudocyst. This pseudocyst measures up to 9.5 cm. If there are signs  of infection, consider aspiration.        CT ABDOMEN AND PELVIS WITH CONTRAST   11/24/2018 11:55 AM      HISTORY: Acute pancreatitis, history of necrosis and pancreatectomy.      TECHNIQUE: CT abdomen and pelvis with Isovue 370, 95 mL IV. Radiation  dose for this scan was reduced using automated exposure control,  adjustment of the mA and/or kV according to patient size, or iterative  reconstruction technique.     COMPARISON: CT abdomen and pelvis 10/21/2018.     FINDINGS: Distal pancreatectomy and splenectomy changes. The mid  pancreatic body, head, and uncinate shows homogeneous enhancement  similar to the prior exam. Ongoing pancreatitis changes noted that  appear stable. There are regions of elevated density that may  represent complex fluid at the pancreatectomy location series 2 image  28. There is increasing ascites fluid at the pancreas and surrounding  the stomach at the left upper quadrant. There is some mild peritoneal  reflection enhancement. There is also likely reactive wall thickening  of the stomach that is only minimally distended. No new fluid  collection within the  residual pancreas parenchyma.     The liver, gallbladder, adrenals, and kidneys show no acute  abnormalities. Multiple stable renal cysts. Vascular calcifications.  No bowel obstruction, or acute bowel abnormality is seen. Appendix not  identified. No secondary signs for appendicitis. Bibasilar atelectasis  noted.                                                                      IMPRESSION:  1. Pancreatitis changes with edema surrounding the pancreas and  increased volume of ascites fluid at the left upper quadrant  surrounding the stomach. Mild peritoneal reflection enhancement noted  that could represent peritonitis. The pancreatectomy changes appears  stable. Stable normal enhancement of the residual pancreas body, head,  and uncinate with no new areas of necrosis. Mildly complex density  fluid at the pancreatectomy site appears stable.  2. No other acute abnormality is seen.     YOLIE WARNER MD        END CHART REVIEW  ==================================================================      Allergies:  Allergies   Allergen Reactions     Nkda [No Known Drug Allergies]        Problem List:    Patient Active Problem List    Diagnosis Date Noted     Anticipated difficulty with intubation 05/23/2017     Priority: High     Class: Chronic     Difficult two hands mask ventilation, intubated multiple times asleep with video laryngoscope. H/o tongue cancer surgery.        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 Please refer if Care Coordination services are needed.    Presenting Problem Signs and Symptoms Treatment Plan   Questions or concerns   during clinic hours   I will call my clinic directly:  [address], [place] [address]  [phone].   Questions or concerns outside clinic hours   I will call the 24 hour nurse line at   [phone] or 464[facility].   Need to schedule an appointment   I will call the 24 hour scheduling team at [phone] or my clinic directly at [phone].   Same day treatment     I will call my clinic first, nurse line if after hours, urgent care and express care if needed.   Clinic care coordination services (regular clinic hours)   I will call a clinic care coordinator directly:     Shelia Emanuel RN [facility]  [phone]    Brianna Cristobal :    [phone]    Or call my clinic at [phone] and ask to speak with care coordination.   Crisis Services: Behavioral or Mental Health  [facility] 24 Hour Phone Line  [phone]    [facility] 24 Hour Crisis Services  [phone]    [facility] [phone]    [facility]   [phone]     Emergency treatment -- Immediately    [facility] 911                Clostridium difficile enterocolitis 12/18/2012     Priority: Medium     Groin fluid collection 12/15/2012     Priority: Medium      CT 12/12- New (since 5/11) collection measuring at least 2.3 cm in diameter and 6.5 cm in length within the right inguinal canal. 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 Gerson Ravi and aristides at the  in 2012  NAD 2017  CT scan of the neck every 2-3 years.  - Thyroid labs yearly.  - Carotid ultrasound in three to four years to evaluate for stenosis.       Colon polyp 08/26/2011     Priority: Medium     Colonoscopy 8/2011-A sessile polyp was found in the cecum. The polyp was 6 mm in size. The polyp was removed with a hot snare. Resection and retrieval were complete. A sessile polyp was found in the proximal transverse colon. The polyp was 15 mm in size. The polyp was removed with a hot snare. Resection and retrieval were complete. A sessile polyp was found in the sigmoid colon. The polyp was 5 mm in size       Hyperlipidemia LDL goal <100 10/31/2010     Priority: Medium     CAD (coronary artery disease) 05/26/2009     Priority: Medium     Stress testing 2009 showed inferior wall ischemia.  Cath preformed; identified moderate CAD with stenosis of 40-50% in LAD and RCA.  No stents were placed.  Echo in 01/2018 (done while in Afib with rates of 100-110); EF of 55-60%.  No RWMA.       GERD (gastroesophageal reflux disease) 05/26/2009     Priority: Medium     Hypertrophy of breast 09/25/2007     Priority: Medium     Diverticulitis of colon 07/17/2007     Priority: Medium     Colitis on CT Scan 5/2011- [place]  Colonoscopy 8/2011 Diverticulitis - Multiple small and large-mouthed diverticula were found in the mid sigmoid colon and at the hepatic  flexure. There was narrowing of the colon in association with the diverticular opening. Nena-diverticular erythema was seen. There was evidence of an impacted diverticulum. Purulent discharge was seen in association with the diverticlar opening. Biopsies were taken with a cold forceps for histology. Multiple small and large-mouthed diverticula were found in the sigmoid colon, in the descending colon, in the transverse colon and in the ascending colon.   Hospitalized diverticulitis 12/12           PERS HX TOBACCO USE - quit in 11/06 with chantix 03/15/2007     Priority: Medium     Hypertension, benign essential, goal below 140/90 11/07/2005     Priority: Medium     Patient has only fair bp control and with family history of diabetes will all ace if lab indicated also has bph and may op for psa and not digital exam next yr        Pain in joint, shoulder region 11/07/2005     Priority: Medium     Hypertrophy of prostate without urinary obstruction 11/07/2005     Priority: Medium     Problem list name updated by automated process. 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 difficile colitis      Colon polyp      Coronary artery disease      Diabetes mellitus (H)      Diverticulitis      Hypertension      Malignant neoplasm (H)      Noninfectious ileitis      Recurrent pancreatitis (H)        Past Surgical History:    Past Surgical History:   Procedure Laterality Date     BREAST SURGERY  2008    right breast mass benign     COLONOSCOPY      multiple polyps removed     COLONOSCOPY  8/24/2011    Procedure:COMBINED COLONOSCOPY, REMOVE TUMOR/POLYP/LESION BY SNARE; Surgeon:MILEY ARBOLEDA; Location:[facility]     COLONOSCOPY  12/17/2012    Procedure: COLONOSCOPY;;  Surgeon: Leon Maurer MD;  Location:  GI     COLONOSCOPY  12/18/2012    Procedure: COLONOSCOPY;;  Surgeon: Leon Maurer MD;   [facility]     DENERVATION OF SPERMATIC CORD MICROSURGICAL Left 5/23/2017    Procedure: DENERVATION OF SPERMATIC CORD MICROSURGICAL;;  Surgeon: Marcio Aggarwal MD;  [facility]     DISSECTION RADICAL NECK BILATERAL  8/2/2012    Procedure: DISSECTION RADICAL NECK BILATERAL;;  Surgeon: Yung Alvares MD;  [facility]     ENDOSCOPIC RETROGRADE CHOLANGIOPANCREATOGRAM N/A 5/10/2016    Procedure: COMBINED ENDOSCOPIC RETROGRADE CHOLANGIOPANCREATOGRAPHY, PLACE TUBE/STENT;  Surgeon: Yovanny Beasley MD;  [facility]     ENDOSCOPIC RETROGRADE CHOLANGIOPANCREATOGRAM N/A 3/29/2018    Procedure: ENDOSCOPIC RETROGRADE CHOLANGIOPANCREATOGRAM;  Endoscopic Retrograde Cholangiopancreatogram, Endoscopic Ultrasound, Biliary Sphincterotomy, Biliary and Pancreatic Stent Placement;  Surgeon: Yovanny Beasley MD;  [facility]     ENDOSCOPIC ULTRASOUND UPPER GASTROINTESTINAL TRACT (GI) N/A 2/3/2016    Procedure: ENDOSCOPIC ULTRASOUND, ESOPHAGOSCOPY / UPPER GASTROINTESTINAL TRACT (GI);  Surgeon: Grabiel Plata MD;  [facility]     ENDOSCOPIC ULTRASOUND UPPER GASTROINTESTINAL TRACT (GI) N/A 3/29/2018    Procedure: ENDOSCOPIC ULTRASOUND, ESOPHAGOSCOPY / UPPER GASTROINTESTINAL TRACT (GI);;  Surgeon: Grabiel Plata MD;  [facility]     ESOPHAGOSCOPY, GASTROSCOPY, DUODENOSCOPY (EGD), COMBINED N/A 2/3/2016    Procedure: COMBINED ENDOSCOPIC ULTRASOUND, ESOPHAGOSCOPY, GASTROSCOPY, DUODENOSCOPY (EGD), FINE NEEDLE ASPIRATE/BIOPSY;  Surgeon: Grabiel Plata MD;  [facility]     ESOPHAGOSCOPY, GASTROSCOPY, DUODENOSCOPY (EGD), COMBINED N/A 6/8/2016    Procedure: COMBINED ESOPHAGOSCOPY, GASTROSCOPY, DUODENOSCOPY (EGD), REMOVE FOREIGN BODY;  Surgeon: Yovanny Beasley MD;  [facility]     ESOPHAGOSCOPY, GASTROSCOPY, DUODENOSCOPY (EGD), COMBINED N/A 4/17/2018    Procedure: COMBINED ESOPHAGOSCOPY, GASTROSCOPY, DUODENOSCOPY (EGD), REMOVE FOREIGN BODY;  EGD with stent removal;   Surgeon: Grabiel Plata MD;  [facility]     EXCISE LESION INTRAORAL  6/14/2012    Procedure: EXCISE LESION INTRAORAL;  Wide Local Excision Floor of Mouth, Direct Laryngoscopy, Bilateral Ida's Marsuplization, Split Thickness Skin Graft from right Thigh  Latex Safe;  Surgeon: Gerson Ravi MD;  [facility]     EXCISE LESION INTRAORAL  8/2/2012    Procedure: EXCISE LESION INTRAORAL;  Floor of Mouth Resection, Bilateral Selective Radical Neck Dissection, Tracheostomy, Left Radial Forearm  Free Flap with Alloderm, Nasogastric Feeding Tube Placement,    * Latex Safe*;  Surgeon: Gerson Ravi MD;  [facility]     EXCISE LESION INTRAORAL  12/11/2012    Procedure: EXCISE LESION INTRAORAL;  takedown of oral flap;  Surgeon: Yung Alvares MD;  [facility]     GRAFT FREE VASCULARIZED (LOCATION)  8/2/2012    Procedure: GRAFT FREE VASCULARIZED (LOCATION);;  Surgeon: Yung Alvares MD;  [facility]     GRAFT SKIN SPLIT THICKNESS FROM EXTREMITY  6/14/2012    Procedure: GRAFT SKIN SPLIT THICKNESS FROM EXTREMITY;;  Surgeon: Gerson Ravi MD;  [facility]     LAPAROSCOPIC ILEOSTOMY TAKEDOWN  6/6/2013    Procedure: LAPAROSCOPIC ILEOSTOMY TAKEDOWN;  Laparoscopic Closure of Enterostomy, Guerda's Type with IleoRectal Anastomosis ;  Surgeon: Grabiel Riddle MD;  [facility]     LAPAROTOMY EXPLORATORY  12/20/2012    Procedure: LAPAROTOMY EXPLORATORY;  Exploratory Laparotomy, total abdominal colectomy, ileostomy formation;  Surgeon: Miquel Cannon MD;  [facility]     LARYNGOSCOPY  6/14/2012    Procedure: LARYNGOSCOPY;;  Surgeon: Gerson Ravi MD;  [facility]     ORTHOPEDIC SURGERY      ganglian cyst left ankle     PANCREATECTOMY, SPLENECTOMY N/A 3/10/2016    Procedure: PANCREATECTOMY, SPLENECTOMY;  Surgeon: Nael Abel MD;  [facility]     SHOULDER SURGERY  2006, 2008    2006- right rotator cuff, 2008 bone spur on left. Dr. Hdez     VARICOCELECTOMY Left 5/23/2017     "Procedure: VARICOCELECTOMY;  Left Varicocele Repair, Denervation of Left Testis;  Surgeon: Marcio Aggarwal MD;  [facility]       Family History:    Family History   Problem Relation Age of Onset     Diabetes Sister         onset age 50     Alzheimer Disease Mother          80     Alzheimer Disease Father          85     Diabetes Other         nephew type 1     Diabetes Other      Aneurysm Sister      Anesthesia Reaction No family hx of      Colon Cancer No family hx of      Colon Polyps No family hx of      Crohn's Disease No family hx of      Ulcerative Colitis No family hx of        Social History:  Marital Status:   [2]  Social History     Tobacco Use     Smoking status: Former Smoker     Packs/day: 1.00     Years: 40.00     Pack years: 40.00     Types: Cigarettes     Last attempt to quit: 2006     Years since quittin.4     Smokeless tobacco: Never Used   Substance Use Topics     Alcohol use: Not Currently     Comment: \"1 beer on Thanksgiving day\"     Drug use: No        Medications:      ASPIRIN NOT PRESCRIBED (INTENTIONAL)   blood glucose (CONTOUR NEXT TEST) test strip   cephALEXin (KEFLEX) 500 MG capsule   gabapentin (NEURONTIN) 300 MG capsule   gabapentin (NEURONTIN) 600 MG tablet   glipiZIDE (GLUCOTROL XL) 5 MG 24 hr tablet   insulin degludec (TRESIBA) 200 UNIT/ML pen   insulin pen needle (BD LUIS U/F) 32G X 4 MM miscellaneous   lisinopril (PRINIVIL/ZESTRIL) 5 MG tablet   metoprolol succinate (TOPROL-XL) 50 MG 24 hr tablet   multivitamin, therapeutic with minerals (THERA-VIT-M) TABS   tamsulosin (FLOMAX) 0.4 MG capsule   vitamin B-12 (CYANOCOBALAMIN) 100 MCG tablet   warfarin (COUMADIN) 2.5 MG tablet         Review of Systems   Constitutional: Negative for chills, fatigue and fever.   HENT: Negative for congestion.    Respiratory: Negative for shortness of breath.    Cardiovascular: Negative for chest pain.   Gastrointestinal: Positive for abdominal pain. Negative for " "abdominal distention, constipation, diarrhea, nausea and vomiting.   Genitourinary: Negative for dysuria, flank pain and frequency.   Musculoskeletal: Negative for back pain.   Skin: Negative for rash.   Neurological: Negative for weakness, light-headedness and headaches.   All other systems reviewed and are negative.      Physical Exam   BP: 149/78  Pulse: 70  Temp: 98.7  F (37.1  C)  Resp: 16  Height: 177.8 cm (5' 10\")  Weight: 86.2 kg (190 lb)  SpO2: 93 %      Physical Exam   Constitutional: He is oriented to person, place, and time. He appears well-developed and well-nourished. No distress.   HENT:   Head: Normocephalic and atraumatic.   Eyes: Conjunctivae are normal.   Cardiovascular: Normal rate and regular rhythm.   Murmur (systolic) heard.  Pulmonary/Chest: Effort normal.   Abdominal: Soft. Bowel sounds are normal. He exhibits distension. There is tenderness (epigastric. nontender in lower abdomen). There is no guarding.   Musculoskeletal: Normal range of motion.   Neurological: He is alert and oriented to person, place, and time.   Skin: Skin is warm and dry. Capillary refill takes less than 2 seconds. He is not diaphoretic.   Psychiatric: He has a normal mood and affect.   Nursing note and vitals reviewed.      ED Course        Procedures               Results for orders placed or performed during the hospital encounter of 05/22/19 (from the past 24 hour(s))   Comprehensive metabolic panel   Result Value Ref Range    Sodium 133 133 - 144 mmol/L    Potassium 4.5 3.4 - 5.3 mmol/L    Chloride 99 94 - 109 mmol/L    Carbon Dioxide 31 20 - 32 mmol/L    Anion Gap 3 3 - 14 mmol/L    Glucose 265 (H) 70 - 99 mg/dL    Urea Nitrogen 20 7 - 30 mg/dL    Creatinine 1.09 0.66 - 1.25 mg/dL    GFR Estimate 68 >60 mL/min/[1.73_m2]    GFR Estimate If Black 79 >60 mL/min/[1.73_m2]    Calcium 9.8 8.5 - 10.1 mg/dL    Bilirubin Total 0.6 0.2 - 1.3 mg/dL    Albumin 3.3 (L) 3.4 - 5.0 g/dL    Protein Total 7.9 6.8 - 8.8 g/dL    " Alkaline Phosphatase 64 40 - 150 U/L    ALT 25 0 - 70 U/L    AST 21 0 - 45 U/L   INR   Result Value Ref Range    INR 2.60 (H) 0.86 - 1.14   Lipase   Result Value Ref Range    Lipase 699 (H) 73 - 393 U/L   CBC with platelets differential   Result Value Ref Range    WBC 11.8 (H) 4.0 - 11.0 10e9/L    RBC Count 4.84 4.4 - 5.9 10e12/L    Hemoglobin 14.2 13.3 - 17.7 g/dL    Hematocrit 46.0 40.0 - 53.0 %    MCV 95 78 - 100 fl    MCH 29.3 26.5 - 33.0 pg    MCHC 30.9 (L) 31.5 - 36.5 g/dL    RDW 16.0 (H) 10.0 - 15.0 %    Platelet Count 313 150 - 450 10e9/L    Diff Method Automated Method     % Neutrophils 66.3 %    % Lymphocytes 24.7 %    % Monocytes 5.7 %    % Eosinophils 2.5 %    % Basophils 0.3 %    % Immature Granulocytes 0.5 %    Nucleated RBCs 0 0 /100    Absolute Neutrophil 7.8 1.6 - 8.3 10e9/L    Absolute Lymphocytes 2.9 0.8 - 5.3 10e9/L    Absolute Monocytes 0.7 0.0 - 1.3 10e9/L    Absolute Eosinophils 0.3 0.0 - 0.7 10e9/L    Absolute Basophils 0.0 0.0 - 0.2 10e9/L    Abs Immature Granulocytes 0.1 0 - 0.4 10e9/L    Absolute Nucleated RBC 0.0    Alcohol ethyl   Result Value Ref Range    Ethanol g/dL <0.01 <0.01 g/dL   Lactic acid whole blood   Result Value Ref Range    Lactic Acid 1.2 0.7 - 2.0 mmol/L       Medications   HYDROmorphone (PF) (DILAUDID) injection 0.5 mg (0.5 mg Intravenous Given 5/22/19 8901)   lactated ringers BOLUS 1,000 mL (0 mLs Intravenous Stopped 5/22/19 0538)     Followed by   lactated ringers BOLUS 1,000 mL (1,000 mLs Intravenous New Bag 5/22/19 0538)     Followed by   lactated ringers infusion (has no administration in time range)     5:56 AM : Discussed with Dr Willam Nevarez. Accepts for admission.     Assessments & Plan (with Medical Decision Making)  73-year-old male with history of recurrent alcoholic pancreatitis presenting for evaluation of epigastric abdominal pain with decreased oral intake due to the pain.  Symptoms reported to be identical to previous episodes of pancreatitis.  Denies  any recent alcohol use.  Moderately tender in the epigastrium but afebrile with normal vital signs.  Reviewed previous imaging and hospital discharge summary from his most recent hospitalization for pancreatitis.  Labs obtained.  Withheld initial imaging due to him overall being well-appearing.     I have reviewed the nursing notes.    I have reviewed the findings, diagnosis, plan and need for follow up with the patient.          Medication List      There are no discharge medications for this visit.         Final diagnoses:   Acute pancreatitis, unspecified complication status, unspecified pancreatitis type       5/22/2019   [facility] EMERGENCY DEPARTMENT     Burnette, Zain Spain MD  05/22/19 0602

## 2019-05-22 NOTE — PROGRESS NOTES
CARE TRANSITION SOCIAL WORK INITIAL ASSESSMENT:      Met with: Patient.    DATA  Principal Problem:    Acute pancreatitis  Active Problems:    Hypertension, benign essential, goal below 140/90    Hypertrophy of prostate without urinary obstruction    CAD (coronary artery disease)    GERD (gastroesophageal reflux disease)    Hyperlipidemia LDL goal <100    Peripheral vascular disease (H)    Type 2 diabetes mellitus with diabetic polyneuropathy, without long-term current use of insulin (H)    Chronic atrial fibrillation (H)    Long term current use of anticoagulant therapy       [facility]: ([facility])  Primary Care MD Name: (Troy Long)  Contact information and PCP information verified: Yes      ASSESSMENT  Cognitive Status: awake, alert and oriented.       Resources List: Home Care              Description of Support System: Supportive, Involved   Who is your support system?: Wife, Children   Support Assessment: Adequate social supports, Adequate family and caregiver support   Insurance Concerns: No Insurance issues identified           This writer met with pt  introduced self and role. Discussed discharge planning and medicare guidelines in regards to home care and SNF benefits.     CTS consulted for elevated CARLOS ENRIQUE score. [facility] writer met with pt, reports that he is independent and has no needs at home. He is requesting that he arranges his own follow up appt with his PCP.    As a system [facility] wants to ensure that across the care continuum that you have support through care coordination services that include nurses and social workers in the outpatient setting.  Due to your acute pancreatitis I feel it is important you have this support when you discharge.  They will be calling you within 24-48 hours of your discharge.   A brochure describing the services was provided.  If you have questions you can reach out to your clinic directly and ask for the Care Coordinator assigned to your  care        PLAN    home        Wilma LOPEZ, [facility], [facility] [phone]

## 2019-05-22 NOTE — H&P
[facility]    History and Physical - Hospitalist Service       Date of Admission:  5/22/2019    Assessment & Plan   Antoine Russo is a 73 year old male with history of recurrent alcoholic pancreatitis, DM 2, chronic a-fib on warfarin, HTN, CAD, and oral cancer admitted on 5/22/2019. He presents with epigastric pain similar to previous episodes of pancreatitis.    Acute pancreatitis  Sharp epigastric pain, consistent with previous episodes of pancreatitis. History of alcoholic pancreatitis with 6 admissions in the last year, most recent 4/19/19. Denies any alcohol since 11/23/18 admission for pancreatitis. Additional history of pancreatic cyst and IPMN s/p distal pancreatectomy. Pain fluctuates between 1/10 and 8/10. Ate dinner last night and is hungry this morning.  Large emesis x 1 today. Received 2L LR in ED, then 125 mL/hr. With good urine output (200-300 mL overnight), GFR 68, and hematocrit 46, I do think he has been adequately fluid resuscitated at this point.  Abdomen is tender in epigastrium without guarding or rebound.  No imaging performed in ED. Denies fever or chills. Afebrile. WBC 11.8. Lipase 699.   Unclear why he is continuing to experience recurrence since he stopped drinking 11/2018. Abdominal US negative for obstruction during 4/19 admission. Will CT today and discuss with [facility].  - I&O  - NPO  - LR @ 125 mL/hr  - Dilaudid 0.3-0.5 mg q2h PRN  - Antiemetics PRN  - CT abd/pelvis    Type 2 diabetes mellitus with diabetic polyneuropathy  A1c 11.6% on 4/16/19. Glucose 265 this AM. Previously A1c in 6.9-8.1% range, increased to 13% following 11/2018 admission. Managed with glipizide 5 mg and Tresiba 36 units at bedtime. Chronic peripheral neuropathy managed with gabapentin 600 mg AM, 600 mg afternoon, and 900 mg HS.  - Hold glipizide while NPO.  - Continue Tresiba.  - High correction scale (NPO schedule).  - Hypo/hyperglycemia protocols.    Chronic atrial fibrillation  "w/ long term current use of anticoagulant therapy  Rate well controlled with metoprolol ER 75 mg BID. Anticoagulated with warfarin.  - Pharmacy to dose warfarin.  - Continue metoprolol.    Hypertension  Outpatient BP is reviewed, appears well controlled on metoprolol ER 75 mg twice daily and lisinopril 5 mg daily.  - Continue metoprolol and lisinopril.    CAD  Normal echo 1/10/18, LVEF 55-60%. Angiography 4/2009 shows 50% stenosis of LAD and 40-50% stenosis of RCA.  - Continue metoprolol ER 75 mg daily.    BPH  - Continue tamsulosin.    GERD   Noted in chart, not on medications.    Hyperlipidemia  Noted in chart, not on medications.    Peripheral vascular disease  Noted in chart, not on medications.          Diet: NPO for Medical/Clinical Reasons Except for: Meds, Ice Chips    DVT Prophylaxis: Already on Warfarin  Chavez Catheter: not present  Code Status: Full    Disposition Plan   Expected discharge: 2 - 3 days, recommended to prior living arrangement once adequate pain management/ tolerating PO medications.  Entered: Fracisco Harris PA-C 05/22/2019, 10:21 AM     The patient's care was discussed with the Attending Physician, Dr. Wyatt and Patient.    rFacisco Harris PA-C  [facility]    ______________________________________________________________________    Chief Complaint   Epigastric pain    History is obtained from the patient    History of Present Illness   Antoine Russo is a 73 year old male with history of type II DM, recurrent alcoholic pancreatitis, IPMN, chronic A. fib on warfarin, hypertension, and CAD who presents with 3 days of worsening epigastric pain consistent with previous episodes of pancreatitis.  Pain waxes and wanes over several minutes.  It is nonradiating.  Woke him from sleep early this morning, was initially 8/10.  \"Fountain like I'd been kicked in the stomach with a pointy toed cowboy boot.\"  Pain has improved since admission, but he is still requesting " hydromorphone. He continued to eat after the onset of the pain and had a normal dinner last night.  He is hungry this morning.  Denies fevers or chills.  Denies nausea/vomiting.  No diarrhea or constipation.  6 admissions for pancreatitis in the last 18 months, the majority clearly associated with increased alcohol use.  Denies any alcohol use since his 11/23/2018 admission.    Review of Systems    The 10 point Review of Systems is negative other than noted in the HPI or here.     Past Medical History    I have reviewed this patient's medical history and updated it with pertinent information if needed.   Past Medical History:   Diagnosis Date     Acute kidney injury (H) 4/17/2019     Acute on chronic pancreatitis (H) 1/23/2018     Bacteriuria with pyuria 4/17/2019     C. difficile colitis      Colon polyp      Colon polyp 8/26/2011    Colonoscopy 8/2011-A sessile polyp was found in the cecum. The polyp was 6 mm in size. The polyp was removed with a hot snare. Resection and retrieval were complete. A sessile polyp was found in the proximal transverse colon. The polyp was 15 mm in size. The polyp was removed with a hot snare. Resection and retrieval were complete. A sessile polyp was found in the sigmoid colon. The polyp was 5 mm     Coronary artery disease      Diabetes mellitus (H)     type 2     Diverticulitis      Diverticulitis of colon 7/17/2007    Colitis on CT Scan 5/2011- [place] Colonoscopy 8/2011 Diverticulitis - Multiple small and large-mouthed diverticula were found in the mid sigmoid colon and at the hepatic flexure. There was narrowing of the colon in association with the diverticular opening. Nena-diverticular erythema was seen. There was evidence of an impacted diverticulum. 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  Past Surgical History:   Procedure Laterality Date     BREAST SURGERY  2008    right breast mass benign     COLONOSCOPY      multiple polyps removed     COLONOSCOPY  8/24/2011    Procedure:COMBINED COLONOSCOPY, REMOVE TUMOR/POLYP/LESION BY SNARE; Surgeon:MILEY ARBOLEDA; Location:[facility]     COLONOSCOPY  12/17/2012    Procedure: COLONOSCOPY;;  Surgeon: Leon Maurer MD;  [facility]     COLONOSCOPY  12/18/2012    Procedure: COLONOSCOPY;;  Surgeon: Leon Maurer MD;  [facility]     DENERVATION OF SPERMATIC CORD MICROSURGICAL Left 5/23/2017    Procedure: DENERVATION OF SPERMATIC CORD MICROSURGICAL;;  Surgeon: Marcio Aggarwal MD;  [facility]     DISSECTION RADICAL NECK BILATERAL  8/2/2012    Procedure: DISSECTION RADICAL NECK BILATERAL;;  Surgeon: Yung Alvares MD;  [facility]     ENDOSCOPIC RETROGRADE CHOLANGIOPANCREATOGRAM N/A 5/10/2016    Procedure: COMBINED ENDOSCOPIC RETROGRADE CHOLANGIOPANCREATOGRAPHY, PLACE TUBE/STENT;  Surgeon: Yovanny Beasley MD;  [facility]     ENDOSCOPIC RETROGRADE CHOLANGIOPANCREATOGRAM N/A 3/29/2018    Procedure: ENDOSCOPIC RETROGRADE CHOLANGIOPANCREATOGRAM;  Endoscopic Retrograde Cholangiopancreatogram, Endoscopic Ultrasound, Biliary Sphincterotomy, Biliary and Pancreatic Stent Placement;  Surgeon: Yovanny Beasley MD;  [facility]     ENDOSCOPIC ULTRASOUND UPPER GASTROINTESTINAL TRACT (GI) N/A 2/3/2016    Procedure: ENDOSCOPIC ULTRASOUND, ESOPHAGOSCOPY / UPPER GASTROINTESTINAL TRACT (GI);  Surgeon: Grabiel Plata MD;  [facility]     ENDOSCOPIC ULTRASOUND UPPER GASTROINTESTINAL TRACT (GI) N/A 3/29/2018    Procedure: ENDOSCOPIC ULTRASOUND, ESOPHAGOSCOPY / UPPER GASTROINTESTINAL TRACT (GI);;  Surgeon: Grabiel Plata MD;  [facility]     ESOPHAGOSCOPY, GASTROSCOPY,  DUODENOSCOPY (EGD), COMBINED N/A 2/3/2016    Procedure: COMBINED ENDOSCOPIC ULTRASOUND, ESOPHAGOSCOPY, GASTROSCOPY, DUODENOSCOPY (EGD), FINE NEEDLE ASPIRATE/BIOPSY;  Surgeon: Grabiel Plata MD;  [facility]     ESOPHAGOSCOPY, GASTROSCOPY, DUODENOSCOPY (EGD), COMBINED N/A 6/8/2016    Procedure: COMBINED ESOPHAGOSCOPY, GASTROSCOPY, DUODENOSCOPY (EGD), REMOVE FOREIGN BODY;  Surgeon: Yovanny Beasley MD;  [facility]     ESOPHAGOSCOPY, GASTROSCOPY, DUODENOSCOPY (EGD), COMBINED N/A 4/17/2018    Procedure: COMBINED ESOPHAGOSCOPY, GASTROSCOPY, DUODENOSCOPY (EGD), REMOVE FOREIGN BODY;  EGD with stent removal;  Surgeon: Grabiel Plata MD;  [facility]     EXCISE LESION INTRAORAL  6/14/2012    Procedure: EXCISE LESION INTRAORAL;  Wide Local Excision Floor of Mouth, Direct Laryngoscopy, Bilateral Ida's Marsuplization, Split Thickness Skin Graft from right Thigh  Latex Safe;  Surgeon: Gerson Ravi MD;  [facility]     EXCISE LESION INTRAORAL  8/2/2012    Procedure: EXCISE LESION INTRAORAL;  Floor of Mouth Resection, Bilateral Selective Radical Neck Dissection, Tracheostomy, Left Radial Forearm  Free Flap with Alloderm, Nasogastric Feeding Tube Placement,    * Latex Safe*;  Surgeon: Gerson Ravi MD;  [facility]     EXCISE LESION INTRAORAL  12/11/2012    Procedure: EXCISE LESION INTRAORAL;  takedown of oral flap;  Surgeon: Yung Alvares MD;  [facility]     GRAFT FREE VASCULARIZED (LOCATION)  8/2/2012    Procedure: GRAFT FREE VASCULARIZED (LOCATION);;  Surgeon: Yung Alvares MD;  [facility]     GRAFT SKIN SPLIT THICKNESS FROM EXTREMITY  6/14/2012    Procedure: GRAFT SKIN SPLIT THICKNESS FROM EXTREMITY;;  Surgeon: Gerson Ravi MD;  [facility]     LAPAROSCOPIC ILEOSTOMY TAKEDOWN  6/6/2013    Procedure: LAPAROSCOPIC ILEOSTOMY TAKEDOWN;  Laparoscopic Closure of Enterostomy, Guerda's Type with IleoRectal Anastomosis ;  Surgeon: Grabiel Riddle MD;   "[facility]     LAPAROTOMY EXPLORATORY  2012    Procedure: LAPAROTOMY EXPLORATORY;  Exploratory Laparotomy, total abdominal colectomy, ileostomy formation;  Surgeon: Miquel Cannon MD;  [facility]     LARYNGOSCOPY  2012    Procedure: LARYNGOSCOPY;;  Surgeon: Gerson Ravi MD;  [facility]     ORTHOPEDIC SURGERY      ganglian cyst left ankle     PANCREATECTOMY, SPLENECTOMY N/A 3/10/2016    Procedure: PANCREATECTOMY, SPLENECTOMY;  Surgeon: Nael Abel MD;  [facility]     SHOULDER SURGERY  , 2008- right rotator cuff,  bone spur on left. Dr. Hdez     VARICOCELECTOMY Left 2017    Procedure: VARICOCELECTOMY;  Left Varicocele Repair, Denervation of Left Testis;  Surgeon: Marcio Aggarwal MD;  [facility]       Social History   I have reviewed this patient's social history and updated it with pertinent information if needed.  Social History     Tobacco Use     Smoking status: Former Smoker     Packs/day: 1.00     Years: 40.00     Pack years: 40.00     Types: Cigarettes     Last attempt to quit: 2006     Years since quittin.4     Smokeless tobacco: Never Used   Substance Use Topics     Alcohol use: Not Currently     Comment: \"1 beer on Thanksgiving day\"     Drug use: No       Family History   I have reviewed this patient's family history and updated it with pertinent information if needed.   Family History   Problem Relation Age of Onset     Diabetes Sister         onset age 50     Alzheimer Disease Mother          80     Alzheimer Disease Father          85     Diabetes Other         nephew type 1     Diabetes Other      Aneurysm Sister      Anesthesia Reaction No family hx of      Colon Cancer No family hx of      Colon Polyps No family hx of      Crohn's Disease No family hx of      Ulcerative Colitis No family hx of        Prior to Admission Medications   Prior to Admission Medications   Prescriptions Last Dose Informant Patient Reported? " Taking?   ASPIRIN NOT PRESCRIBED (INTENTIONAL)  Self Yes No   Sig: Antiplatelet medication not prescribed intentionally due to Current anticoagulant therapy (warfarin/enoxaparin)   blood glucose (CONTOUR NEXT TEST) test strip  Self No No   Sig: Use to test blood sugar 2 times daily or as directed.   gabapentin (NEURONTIN) 300 MG capsule 5/21/2019 at Unknown time Self No Yes   Sig: Take 1 capsule (300 mg) by mouth At Bedtime 1 tablet along with his 600mg dose at bedtime. Total gabapentin dose is 600mg/600mg/900mg.   gabapentin (NEURONTIN) 600 MG tablet 5/21/2019 at Unknown time Self No Yes   Sig: Take 1 tablet (600 mg) by mouth 3 times daily Along with 300mg for a total of 900mg for bedtime dose   glipiZIDE (GLUCOTROL XL) 5 MG 24 hr tablet 5/21/2019 at Unknown time  No Yes   Sig: Take 1 tablet (5 mg) by mouth daily   insulin degludec (TRESIBA) 200 UNIT/ML pen 5/21/2019 at Unknown time  No Yes   Sig: Take 36 units at bedtime (increase by 2 units every 4 days until am readings are under 120) max dose: 42.   insulin pen needle (BD LUIS U/F) 32G X 4 MM miscellaneous  Self No No   Sig: Use 1 daily as directed.   lisinopril (PRINIVIL/ZESTRIL) 5 MG tablet 5/21/2019 at Unknown time Self No Yes   Sig: TAKE 1 TABLET BY MOUTH ONCE DAILY   metoprolol succinate (TOPROL-XL) 50 MG 24 hr tablet 5/21/2019 at Unknown time Self No Yes   Sig: Take 1.5 tablets (75 mg) by mouth 2 times daily   multivitamin, therapeutic with minerals (THERA-VIT-M) TABS 5/21/2019 at Unknown time Self No Yes   Sig: Take 1 tablet by mouth daily.   tamsulosin (FLOMAX) 0.4 MG capsule 5/21/2019 at Unknown time Self No Yes   Sig: TAKE ONE CAPSULE BY MOUTH ONCE DAILY   vitamin B-12 (CYANOCOBALAMIN) 100 MCG tablet 5/21/2019 at Unknown time Self Yes Yes   Sig: Take 100 mcg by mouth daily   warfarin (COUMADIN) 2.5 MG tablet 5/21/2019 at Unknown time  No Yes   Sig: TAKE 1 TABLET BY MOUTH ONCE DAILY OR AS DIRECTED BY ACC.      Facility-Administered Medications: None      Allergies   Allergies   Allergen Reactions     Nkda [No Known Drug Allergies]        Physical Exam   Vital Signs: Temp: 96.7  F (35.9  C) Temp src: Axillary BP: 141/74 Pulse: 76   Resp: 18 SpO2: 94 % O2 Device: None (Room air)    Weight: 198 lbs 10.15 oz    General: Appears calm, comfortable, nontoxic. Answers questions quickly and appropriately with clear speech. No apparent distress.  Skin: Pink, warm, dry.  Eyes: PERRL. Sclera are white.  HENT:  Normocephalic, atraumatic. Oropharynx is moist, without lesions or exudate.  Lymph/Hematologic: No occipital, anterior/posterior cervical, supraclavicular, or axillary lymphadenopathy.  CV: RRR, clear S1/S2 without murmur, rub, or gallop. Radial pulses equal bilaterally. No lower extremity edema.  Respiratory: CTA and equal bilaterally. Normal respiratory effort.  GI: Soft, flat.  Epigastric tenderness without rebound or guarding.  Normal bowel sounds.  Musculoskeletal: Moving all extremities well. Normal muscle bulk and tone.  Neuro: Memory and cognition appear normal. CN II - XII grossly intact. Symmetrical extremity strength.  Psych: Normal mood and affect.      Data   Data reviewed today: I reviewed all medications, new labs and imaging results over the last 24 hours. I personally reviewed no images or EKG's today.    Recent Labs   Lab 05/22/19  0447   WBC 11.8*   HGB 14.2   MCV 95      INR 2.60*      POTASSIUM 4.5   CHLORIDE 99   CO2 31   BUN 20   CR 1.09   ANIONGAP 3   NILSON 9.8   *   ALBUMIN 3.3*   PROTTOTAL 7.9   BILITOTAL 0.6   ALKPHOS 64   ALT 25   AST 21   LIPASE 699*

## 2019-05-22 NOTE — PROGRESS NOTES
Addendum    Ct abd-IMPRESSION:  1. Splenectomy and partial pancreatectomy.  2. Gastric wall prominence/ill-definition, left upper  quadrant/perigastric fluid, and adjacent inflammatory stranding which  extends along the stomach to the second portion of the duodenum and  medial aspect of the gallbladder. This has increased, particularly  adjacent to the gallbladder and duodenum since 11/24/2018.  Possibilities include pancreatitis (though the head of the pancreatic  head is otherwise unremarkable in appearance) versus nonspecific  gastritis versus other causes of inflammation.  3. Prominence of the urinary bladder wall which may be related to lack  of distention but is nonspecific    Unclear exactly whether pt has acute pancreatitis or something else. NO obvious obstructing lesions by CT.  Will tx like pancreatitis tonight and recheck lipase in am. Start ppi .Will hold off on talking to GI until we see if clinically improves tomorrow and can recheck lipase.  Katie Wyatt

## 2019-05-22 NOTE — PROGRESS NOTES
Letter by Wilma Thomas LICSW on 2019     Transition Communication Hand-off for Care Transitions to Next Level of Care Provider     Name: Antoine Russo  : 1945  MRN #: [id number]  Primary Care Provider: Katie Gross  Primary Care MD Name: (Troy Long)  Primary Clinic: [address] [place] [id number]  [facility] Name: ([facility])  Reason for Hospitalization:  Acute pancreatitis, unspecified complication status, unspecified pancreatitis type [K85.90]  Admit Date/Time: 2019  4:39 AM  Discharge Date: 19  Payor Source: Payor: BCBS / Plan: BCBS OUT OF STATE / Product Type: Indemnity /      Readmission Assessment Measure (CARLOS ENRIQUE) Risk Score/category: elevated                       Reason for Communication Hand-off Referral: Fragility     Discharge Plan:home        Concern for non-adherence with plan of care:                 Y/N no  Follow-up specialty is recommended: No    Follow-up plan:           Future Appointments   Date Time Provider Department Center   2019 10:15 AM NB ANTI COAG [facility]   2019  8:00 AM Katie Gross MD [facility]   10/15/2019  8:45 AM Anais Guajardo MD [facility]            Luis Recommendations:  Pt will discharge home when stable, CTS consult for elevated CARLOS ENRIQUE score, pt not interested in any services, reports he is independent at home.      Wilma Thomas MSW, ANDREIA, [facility] [phone]     AVS/Discharge Summary is the source of truth; this is a helpful guide for improved communication of patient story

## 2019-05-22 NOTE — LETTER
Transition Communication Hand-off for Care Transitions to Next Level of Care Provider    Name: Antoine Russo  : 1945  MRN #: [id number]  Primary Care Provider: Katie Gross  Primary Care MD Name: (Troy Long)  Primary Clinic: [address] [facility] [address]  Primary Care Clinic Name: ([facility])  Reason for Hospitalization:  Acute pancreatitis, unspecified complication status, unspecified pancreatitis type [K85.90]  Admit Date/Time: 2019  4:39 AM  Discharge Date: 19  Payor Source: Payor: BCBS / Plan: BCBS OUT OF STATE / Product Type: Indemnity /     Readmission Assessment Measure (CARLOS ENRIQUE) Risk Score/category: elevated         Reason for Communication Hand-off Referral: Fragility    Discharge Plan:home       Concern for non-adherence with plan of care:   Y/N no  Follow-up specialty is recommended: No    Follow-up plan:    Future Appointments   Date Time Provider Department Center   [facility] 10[facility]   2019  8:00 AM Katie Gross MD [facility]   10/15/2019  8:45 AM Anais Guajardo MD [facility]         Luis Recommendations:  Pt will discharge home when stable, CTS consult for elevated CARLOS ENRIQUE score, pt not interested in any services, reports he is independent at home.     Wilma Thomas MSW, [facility], [facility] [phone]    AVS/Discharge Summary is the source of truth; this is a helpful guide for improved communication of patient story

## 2019-05-22 NOTE — PROGRESS NOTES
Clinic Care Coordination Contact    Care Coordination Communication    Referral Source:  CTS    Clinical Data: RN CC has received a referral from CTS staff identifying a need for RN CC  to follow up with the patient once they are discharged.     Plan:  RN CC will outreach and follow up with the patient once they are discharged from the hospital. RN CC will continue to monitor for the patient to discharge.        Coreen Craig RN, [facility]  RN Care Coordinator  [facility]  Phone # [phone]  5/22/2019 12:14 PM

## 2019-05-22 NOTE — PHARMACY-ANTICOAGULATION SERVICE
Clinical Pharmacy - Warfarin Dosing Consult     Pharmacy has been consulted to manage this patient s warfarin therapy.  Indication: Atrial Fibrillation  Therapy Goal: INR 2-3  OP Anticoag Clinic: [facility]  Warfarin Prior to Admission: Yes  Warfarin PTA Regimen: 2.5 mg daily  Recent documented change in oral intake/nutrition: Unknown  Dose Comments: INR of 2.6, will give home dsoe of 2.5 mg    INR   Date Value Ref Range Status   05/22/2019 2.60 (H) 0.86 - 1.14 Final     INR Protime   Date Value Ref Range Status   05/02/2019 3.0 (A) 0.86 - 1.14 Final       Recommend warfarin 2.5 mg today.  Pharmacy will monitor Antoine Russo daily and order warfarin doses to achieve specified goal.      Please contact pharmacy as soon as possible if the warfarin needs to be held for a procedure or if the warfarin goals change.

## 2019-05-23 LAB
ANION GAP SERPL CALCULATED.3IONS-SCNC: 4 MMOL/L (ref 3–14)
BACTERIA SPEC CULT: NORMAL
BUN SERPL-MCNC: 14 MG/DL (ref 7–30)
CALCIUM SERPL-MCNC: 8.9 MG/DL (ref 8.5–10.1)
CHLORIDE SERPL-SCNC: 103 MMOL/L (ref 94–109)
CO2 SERPL-SCNC: 33 MMOL/L (ref 20–32)
CREAT SERPL-MCNC: 1.15 MG/DL (ref 0.66–1.25)
ERYTHROCYTE [DISTWIDTH] IN BLOOD BY AUTOMATED COUNT: 16.6 % (ref 10–15)
GFR SERPL CREATININE-BSD FRML MDRD: 63 ML/MIN/{1.73_M2}
GLUCOSE BLDC GLUCOMTR-MCNC: 131 MG/DL (ref 70–99)
GLUCOSE BLDC GLUCOMTR-MCNC: 160 MG/DL (ref 70–99)
GLUCOSE BLDC GLUCOMTR-MCNC: 96 MG/DL (ref 70–99)
GLUCOSE BLDC GLUCOMTR-MCNC: 98 MG/DL (ref 70–99)
GLUCOSE SERPL-MCNC: 152 MG/DL (ref 70–99)
HCT VFR BLD AUTO: 44.5 % (ref 40–53)
HGB BLD-MCNC: 13.7 G/DL (ref 13.3–17.7)
INR PPP: 2.89 (ref 0.86–1.14)
LIPASE SERPL-CCNC: 373 U/L (ref 73–393)
Lab: NORMAL
MCH RBC QN AUTO: 29.5 PG (ref 26.5–33)
MCHC RBC AUTO-ENTMCNC: 30.8 G/DL (ref 31.5–36.5)
MCV RBC AUTO: 96 FL (ref 78–100)
PLATELET # BLD AUTO: 270 10E9/L (ref 150–450)
POTASSIUM SERPL-SCNC: 4.3 MMOL/L (ref 3.4–5.3)
RBC # BLD AUTO: 4.65 10E12/L (ref 4.4–5.9)
SODIUM SERPL-SCNC: 140 MMOL/L (ref 133–144)
SPECIMEN SOURCE: NORMAL
WBC # BLD AUTO: 11.8 10E9/L (ref 4–11)

## 2019-05-23 PROCEDURE — C9113 INJ PANTOPRAZOLE SODIUM, VIA: HCPCS | Performed by: INTERNAL MEDICINE

## 2019-05-23 PROCEDURE — 25000128 H RX IP 250 OP 636: Performed by: INTERNAL MEDICINE

## 2019-05-23 PROCEDURE — 99232 SBSQ HOSP IP/OBS MODERATE 35: CPT | Performed by: INTERNAL MEDICINE

## 2019-05-23 PROCEDURE — 25000128 H RX IP 250 OP 636: Performed by: FAMILY MEDICINE

## 2019-05-23 PROCEDURE — 25000132 ZZH RX MED GY IP 250 OP 250 PS 637: Performed by: INTERNAL MEDICINE

## 2019-05-23 PROCEDURE — 83690 ASSAY OF LIPASE: CPT | Performed by: PHYSICIAN ASSISTANT

## 2019-05-23 PROCEDURE — 00000146 ZZHCL STATISTIC GLUCOSE BY METER IP

## 2019-05-23 PROCEDURE — 25000132 ZZH RX MED GY IP 250 OP 250 PS 637: Performed by: PHYSICIAN ASSISTANT

## 2019-05-23 PROCEDURE — 36415 COLL VENOUS BLD VENIPUNCTURE: CPT | Performed by: PHYSICIAN ASSISTANT

## 2019-05-23 PROCEDURE — 25800030 ZZH RX IP 258 OP 636: Performed by: EMERGENCY MEDICINE

## 2019-05-23 PROCEDURE — 25000128 H RX IP 250 OP 636: Performed by: EMERGENCY MEDICINE

## 2019-05-23 PROCEDURE — 80048 BASIC METABOLIC PNL TOTAL CA: CPT | Performed by: PHYSICIAN ASSISTANT

## 2019-05-23 PROCEDURE — 85610 PROTHROMBIN TIME: CPT | Performed by: PHYSICIAN ASSISTANT

## 2019-05-23 PROCEDURE — 25000131 ZZH RX MED GY IP 250 OP 636 PS 637: Performed by: EMERGENCY MEDICINE

## 2019-05-23 PROCEDURE — 85027 COMPLETE CBC AUTOMATED: CPT | Performed by: PHYSICIAN ASSISTANT

## 2019-05-23 PROCEDURE — 12000000 ZZH R&B MED SURG/OB

## 2019-05-23 RX ORDER — WARFARIN SODIUM 2.5 MG/1
2.5 TABLET ORAL
Status: COMPLETED | OUTPATIENT
Start: 2019-05-23 | End: 2019-05-23

## 2019-05-23 RX ORDER — NICOTINE POLACRILEX 4 MG
15-30 LOZENGE BUCCAL
Status: DISCONTINUED | OUTPATIENT
Start: 2019-05-23 | End: 2019-05-24 | Stop reason: HOSPADM

## 2019-05-23 RX ORDER — DEXTROSE MONOHYDRATE 25 G/50ML
25-50 INJECTION, SOLUTION INTRAVENOUS
Status: DISCONTINUED | OUTPATIENT
Start: 2019-05-23 | End: 2019-05-24 | Stop reason: HOSPADM

## 2019-05-23 RX ADMIN — WARFARIN SODIUM 2.5 MG: 2.5 TABLET ORAL at 17:46

## 2019-05-23 RX ADMIN — INSULIN DEGLUDEC INJECTION 30 UNITS: 200 INJECTION, SOLUTION SUBCUTANEOUS at 21:09

## 2019-05-23 RX ADMIN — Medication 0.5 MG: at 15:31

## 2019-05-23 RX ADMIN — ONDANSETRON 4 MG: 4 TABLET, ORALLY DISINTEGRATING ORAL at 15:28

## 2019-05-23 RX ADMIN — PANTOPRAZOLE SODIUM 40 MG: 40 INJECTION, POWDER, FOR SOLUTION INTRAVENOUS at 19:53

## 2019-05-23 RX ADMIN — ONDANSETRON 4 MG: 4 TABLET, ORALLY DISINTEGRATING ORAL at 09:06

## 2019-05-23 RX ADMIN — METOPROLOL SUCCINATE 75 MG: 50 TABLET, EXTENDED RELEASE ORAL at 09:09

## 2019-05-23 RX ADMIN — PANTOPRAZOLE SODIUM 40 MG: 40 INJECTION, POWDER, FOR SOLUTION INTRAVENOUS at 09:06

## 2019-05-23 RX ADMIN — CEFTRIAXONE SODIUM 1 G: 1 INJECTION, SOLUTION INTRAVENOUS at 19:38

## 2019-05-23 RX ADMIN — LISINOPRIL 5 MG: 5 TABLET ORAL at 09:09

## 2019-05-23 RX ADMIN — Medication 0.5 MG: at 09:12

## 2019-05-23 RX ADMIN — SODIUM CHLORIDE, POTASSIUM CHLORIDE, SODIUM LACTATE AND CALCIUM CHLORIDE 1000 ML: 600; 310; 30; 20 INJECTION, SOLUTION INTRAVENOUS at 17:36

## 2019-05-23 RX ADMIN — SODIUM CHLORIDE, POTASSIUM CHLORIDE, SODIUM LACTATE AND CALCIUM CHLORIDE 1000 ML: 600; 310; 30; 20 INJECTION, SOLUTION INTRAVENOUS at 01:16

## 2019-05-23 RX ADMIN — SODIUM CHLORIDE, POTASSIUM CHLORIDE, SODIUM LACTATE AND CALCIUM CHLORIDE 1000 ML: 600; 310; 30; 20 INJECTION, SOLUTION INTRAVENOUS at 08:56

## 2019-05-23 RX ADMIN — TAMSULOSIN HYDROCHLORIDE 0.4 MG: 0.4 CAPSULE ORAL at 09:09

## 2019-05-23 RX ADMIN — GABAPENTIN 600 MG: 600 TABLET, FILM COATED ORAL at 14:14

## 2019-05-23 RX ADMIN — METOPROLOL SUCCINATE 75 MG: 50 TABLET, EXTENDED RELEASE ORAL at 19:53

## 2019-05-23 RX ADMIN — GABAPENTIN 600 MG: 600 TABLET, FILM COATED ORAL at 09:09

## 2019-05-23 RX ADMIN — GABAPENTIN 900 MG: 300 CAPSULE ORAL at 21:14

## 2019-05-23 ASSESSMENT — ACTIVITIES OF DAILY LIVING (ADL)
ADLS_ACUITY_SCORE: 11

## 2019-05-23 NOTE — PROGRESS NOTES
[facility] Medicine Progress Note  Date of Service: 05/23/2019  Assessment & Plan     Antoine Russo is a 73 year old male with history of recurrent alcoholic pancreatitis, DM 2, chronic a-fib on warfarin, HTN, CAD, and oral cancer admitted on 5/22/2019. He presents with epigastric pain similar to previous episodes of pancreatitis.     Acute pancreatitis  Sharp epigastric pain, consistent with previous episodes of pancreatitis. History of alcoholic pancreatitis with 6 admissions in the last year, most recent 4/19/19. Denies any alcohol since 11/23/18 admission for pancreatitis. Additional history of pancreatic cyst and IPMN s/p distal pancreatectomy. Unclear why he is continuing to experience recurrence since he stopped drinking 11/2018. Abdominal US negative for obstruction during 4/19 admission.  CT findings discussed with [facility]  pancreeas specialist. They reviewed CT and feel this is just pancreatitis. Cause for this unclear as tg, ca ok, no obstruction by ultrasound 4/2019 and no etoh. Pancreas specialist rec f/u with them in 6 weeks for possible EOS vs MRCP once inflammation has subsided. 5/23 feels better and lipase down to 373    - NPO  - LR @ 125 mL/hr  - Dilaudid 0.3-0.5 mg q2h PRN  - Antiemetics PRN      UTI- pos urinalysis and unit(s) cx pending- started on ceftriaxone last pm. He now reports he has been having a little dysuria     Type 2 diabetes mellitus with diabetic polyneuropathy  A1c 11.6% on 4/16/19. Glucose 265 this AM. Previously A1c in 6.9-8.1% range, increased to 13% following 11/2018 admission. Managed with glipizide 5 mg and Tresiba 36 units at bedtime. Chronic peripheral neuropathy managed with gabapentin 600 mg AM, 600 mg afternoon, and 900 mg HS.  - Hold glipizide while NPO.  - Continue Tresiba.  -  med correction scale (NPO schedule).       Chronic atrial fibrillation w/ long term current use of anticoagulant therapy  Rate well controlled with  metoprolol ER 75 mg BID. Anticoagulated with warfarin.  - Pharmacy to dose warfarin.  - Continue metoprolol.     Hypertension  Outpatient BP is reviewed, appears well controlled on metoprolol ER 75 mg twice daily and lisinopril 5 mg daily.  - Continue metoprolol and lisinopril.     CAD  Normal echo 1/10/18, LVEF 55-60%. Angiography 4/2009 shows 50% stenosis of LAD and 40-50% stenosis of RCA.  - Continue metoprolol ER 75 mg daily.     BPH  - Continue tamsulosin.     GERD   Noted in chart, not on medications.     Hyperlipidemia  Noted in chart, not on medications.     Peripheral vascular disease  Noted in chart, not on medications.             Diet: NPO for Medical/Clinical Reasons Except for: Meds, Ice Chips    DVT Prophylaxis: Already on Warfarin  Chavez Catheter: not present  Code Status: Full        Disposition Plan- cont inpt until has improved and can take po      Katie Wyatt        Interval History    feels a little better. Pain not as intense but still present. Hungry    Physical Exam   Temp:  [96.6  F (35.9  C)-98.3  F (36.8  C)] 98.2  F (36.8  C)  Pulse:  [62-83] 69  Resp:  [16-18] 16  BP: ()/(51-72) 102/58  SpO2:  [91 %-94 %] 91 %    Weights:   Vitals:    05/22/19 0441 05/22/19 0629   Weight: 86.2 kg (190 lb) 90.1 kg (198 lb 10.2 oz)    Body mass index is 28.5 kg/m .    Constitutional: nad  CV: Regular  Respiratory: CTA bilaterally  GI: Soft, sl tender luq  Skin: Warm and dry  Musculoskeletal:no edema    Data   Recent Labs   Lab 05/23/19  0618 05/22/19  0447   WBC 11.8* 11.8*   HGB 13.7 14.2   MCV 96 95    313   INR 2.89* 2.60*    133   POTASSIUM 4.3 4.5   CHLORIDE 103 99   CO2 33* 31   BUN 14 20   CR 1.15 1.09   ANIONGAP 4 3   NILSON 8.9 9.8   * 265*   ALBUMIN  --  3.3*   PROTTOTAL  --  7.9   BILITOTAL  --  0.6   ALKPHOS  --  64   ALT  --  25   AST  --  21   LIPASE 373 699*       Recent Labs   Lab 05/23/19  1112 05/23/19  0751 05/23/19  0618 05/22/19  2126 05/22/19  0447   GLC  --    --  152*  --  265*   * 160*  --  136*  --         Unresulted Labs Ordered in the Past 30 Days of this Admission     Date and Time Order Name Status Description    5/22/2019 1436 Urine Culture Aerobic Bacterial Preliminary            Imaging  Recent Results (from the past 24 hour(s))   CT Abd/Pelvis w Contrast*    Narrative    CT ABDOMEN/PELVIS WITH CONTRAST May 22, 2019 1:33 PM     HISTORY: Abdomen infection (include peritonitis).    CONTRAST DOSE: 97 mL Isovue 370.    TECHNIQUE: Radiation dose for this scan was reduced using automated  exposure control, adjustment of the mA and/or kV according to patient  size, or iterative reconstruction technique.    FINDINGS: Linear atelectasis or fibrosis is noted at the right lung  base. Pleural-based nodule is noted posteromedially at the right lung  base, probably unchanged from 11/24/2018. Although larger, this has  been present since 5/2/2016 and is therefore likely benign. The  stomach wall appears to be thickened and somewhat ill-defined which  may represent nonspecific gastritis. Adjacent inflammatory stranding  is noted extending to the second portion of the duodenum and adjacent  to the gallbladder. Small amount of left upper quadrant peritoneal  fluid is noted. The pancreatic tail and spleen are absent. There is no  evidence of bowel obstruction or free peritoneal air. The liver,  pancreatic head, and kidneys appear within normal limits. Multiple  renal cysts are noted, the largest pedunculated in the right lower  pole measuring 3.8 cm. Urinary bladder wall is prominent though this  may be related to lack of distention. Pelvic contents are otherwise  unremarkable.      Impression    IMPRESSION:  1. Splenectomy and partial pancreatectomy.  2. Gastric wall prominence/ill-definition, left upper  quadrant/perigastric fluid, and adjacent inflammatory stranding which  extends along the stomach to the second portion of the duodenum and  medial aspect of the  gallbladder. This has increased, particularly  adjacent to the gallbladder and duodenum since 11/24/2018.  Possibilities include pancreatitis (though the head of the pancreatic  head is otherwise unremarkable in appearance) versus nonspecific  gastritis versus other causes of inflammation.  3. Prominence of the urinary bladder wall which may be related to lack  of distention but is nonspecific.    SARAH SHI MD            Medications     lactated ringers 1,000 mL (05/23/19 0856)       cefTRIAXone  1 g Intravenous Q24H     gabapentin  600 mg Oral BID    And     gabapentin  900 mg Oral At Bedtime     insulin aspart  1-7 Units Subcutaneous TID AC     insulin aspart  1-5 Units Subcutaneous At Bedtime     insulin degludec  36 Units Subcutaneous At Bedtime     lisinopril  5 mg Oral Daily     metoprolol succinate ER  75 mg Oral BID     pantoprazole (PROTONIX) IV  40 mg Intravenous BID     sodium chloride (PF)  3 mL Intravenous Q8H     tamsulosin  0.4 mg Oral Daily       Katie Wyatt

## 2019-05-23 NOTE — PLAN OF CARE
Pt alert/oriented. Denies nausea. Has been comfortable this shift & has not needed any pain meds. Up independently in room. Voids in urinal. NPO & has not asked for any ice chips. IVF infusing at 125cc/hr. afebrile

## 2019-05-23 NOTE — PLAN OF CARE
VSS. Up independently.  NPO.  MIVF infusing.  Showered and ambulated in hallway.   PRN Dilaudid given for c/o pain with stated adequate relief.

## 2019-05-23 NOTE — PLAN OF CARE
Room air, afebrile. Up independently in room. Urine cloudy. Rocephin started for UTI. IVF continued as ordered. Is NPO except sips with medications. He checks his BG in morning and evening at home, evening BG = 136; scheduled Tresiba given. Zofran and Dilaudid given in combination with good relief. Pain mostly on left side of abdomen.

## 2019-05-24 VITALS
HEART RATE: 69 BPM | TEMPERATURE: 96 F | WEIGHT: 198.63 LBS | OXYGEN SATURATION: 96 % | SYSTOLIC BLOOD PRESSURE: 120 MMHG | HEIGHT: 70 IN | DIASTOLIC BLOOD PRESSURE: 52 MMHG | BODY MASS INDEX: 28.44 KG/M2 | RESPIRATION RATE: 18 BRPM

## 2019-05-24 LAB
GLUCOSE BLDC GLUCOMTR-MCNC: 117 MG/DL (ref 70–99)
GLUCOSE BLDC GLUCOMTR-MCNC: 80 MG/DL (ref 70–99)
GLUCOSE BLDC GLUCOMTR-MCNC: 92 MG/DL (ref 70–99)
GLUCOSE BLDC GLUCOMTR-MCNC: 94 MG/DL (ref 70–99)
INR PPP: 2.7 (ref 0.86–1.14)

## 2019-05-24 PROCEDURE — 25000132 ZZH RX MED GY IP 250 OP 250 PS 637: Performed by: PHYSICIAN ASSISTANT

## 2019-05-24 PROCEDURE — 99238 HOSP IP/OBS DSCHRG MGMT 30/<: CPT | Performed by: INTERNAL MEDICINE

## 2019-05-24 PROCEDURE — 25000128 H RX IP 250 OP 636: Performed by: INTERNAL MEDICINE

## 2019-05-24 PROCEDURE — 36415 COLL VENOUS BLD VENIPUNCTURE: CPT | Performed by: INTERNAL MEDICINE

## 2019-05-24 PROCEDURE — 85610 PROTHROMBIN TIME: CPT | Performed by: INTERNAL MEDICINE

## 2019-05-24 PROCEDURE — 00000146 ZZHCL STATISTIC GLUCOSE BY METER IP

## 2019-05-24 PROCEDURE — 99207 ZZC CDG-CODE INCORRECT PER BILLING BASED ON TIME: CPT | Performed by: INTERNAL MEDICINE

## 2019-05-24 PROCEDURE — C9113 INJ PANTOPRAZOLE SODIUM, VIA: HCPCS | Performed by: INTERNAL MEDICINE

## 2019-05-24 PROCEDURE — 25800030 ZZH RX IP 258 OP 636: Performed by: EMERGENCY MEDICINE

## 2019-05-24 RX ORDER — WARFARIN SODIUM 2.5 MG/1
2.5 TABLET ORAL
Status: DISCONTINUED | OUTPATIENT
Start: 2019-05-24 | End: 2019-05-24 | Stop reason: HOSPADM

## 2019-05-24 RX ADMIN — GABAPENTIN 600 MG: 600 TABLET, FILM COATED ORAL at 08:51

## 2019-05-24 RX ADMIN — LISINOPRIL 5 MG: 5 TABLET ORAL at 08:51

## 2019-05-24 RX ADMIN — METOPROLOL SUCCINATE 75 MG: 50 TABLET, EXTENDED RELEASE ORAL at 08:51

## 2019-05-24 RX ADMIN — PANTOPRAZOLE SODIUM 40 MG: 40 INJECTION, POWDER, FOR SOLUTION INTRAVENOUS at 08:52

## 2019-05-24 RX ADMIN — SODIUM CHLORIDE, POTASSIUM CHLORIDE, SODIUM LACTATE AND CALCIUM CHLORIDE 1000 ML: 600; 310; 30; 20 INJECTION, SOLUTION INTRAVENOUS at 02:18

## 2019-05-24 RX ADMIN — TAMSULOSIN HYDROCHLORIDE 0.4 MG: 0.4 CAPSULE ORAL at 08:51

## 2019-05-24 ASSESSMENT — ACTIVITIES OF DAILY LIVING (ADL)
ADLS_ACUITY_SCORE: 11

## 2019-05-24 NOTE — PROGRESS NOTES
[facility] DISCHARGE NOTE    Patient discharged to transitional care unit at 12:58 PM, after MD had pt say until after lunch to ensure was still feeling good. via wheel chair. Accompanied by spouse and staff. Discharge instructions reviewed with patient and spouse, opportunity offered to ask questions. Prescriptions - None ordered for discharge. All belongings sent with patient.    Lauren Hemphill RN BSN

## 2019-05-24 NOTE — DISCHARGE INSTRUCTIONS
As a system [facility] wants to ensure that across the care continuum that you have support through care coordination services that include nurses and social workers in the outpatient setting.  Due to your acute pancreatitis I feel it is important you have this support when you discharge.  They will be calling you within 24-48 hours of your discharge.   A brochure describing the services was provided.  If you have questions you can reach out to your clinic directly and ask for the Care Coordinator assigned to your care    Warfarin Instructions  Your INR today is 2.7  Goal INR 2-3  Please continue your home dose of warfarin of 2.5 mg daily and recheck your INR with the Anticoagulation Clinic as previously scheduled. (May 30th)

## 2019-05-24 NOTE — PLAN OF CARE
Pt up independently. Denies need for pain/nausea med at this time, states he is comfortable. BG 96 at 2000.     Spoke with Naun DEMPSEY re: BG checks & long acting tresiba insulin. Orders put in for q4hr BG checks due to NPO status. Per Naun, give tresiba 30units instead of 36units tonight only.

## 2019-05-24 NOTE — DISCHARGE SUMMARY
[facility]    Discharge Summary  Hospital Medicine    Date of Admission:  5/22/2019  Date of Discharge:  5/24/2019   Discharging Provider: Katie Wyatt  Date of Service: 5/24/2019     Primary Care     Katie Gross  [address] [facility] [address]     Assessment & Plan     Antoine Russo is a 73 year old male with history of recurrent alcoholic pancreatitis, DM 2, chronic a-fib on warfarin, HTN, CAD, and oral cancer admitted on 5/22/2019. He presents with epigastric pain similar to previous episodes of pancreatitis.     Acute pancreatitis  Sharp epigastric pain, consistent with previous episodes of pancreatitis. History of alcoholic pancreatitis with 6 admissions in the last year, most recent 4/19/19. Denies any alcohol since 11/23/18 admission for pancreatitis. Additional history of pancreatic cyst and IPMN s/p distal pancreatectomy. Unclear why he is continuing to experience recurrence since he stopped drinking 11/2018. Abdominal US negative for obstruction during 4/19 admission.  CT findings discussed with [facility]  pancreeas specialist. They reviewed CT and feel this is just pancreatitis. Cause for this unclear as tg, ca ok, no obstruction by ultrasound 4/2019 and no etoh. Pancreas specialist rec f/u with them in 6 weeks for possible EOS vs MRCP once inflammation has subsided. 5/23 feels better and lipase down to 373. On 5/24 pain free. Will advance diet and discharge if does well              Abnormal u/a pos urinalysis for 182 wbc and 20 rbc. occ gets burning. He was empirically tx with ceftriaxone pending cx.  Of note, admit 4/2019 pos u/a similar and ucx pos for e coli and tx.  Unclear why pt has persistent wbc in urine. . Would rec recheck urine in clinic and if still abnormal, may need further eval     Type 2 diabetes mellitus with diabetic polyneuropathy  A1c 11.6% on 4/16/19. Glucose 265 this AM. Previously A1c in 6.9-8.1% range, increased to 13% following 11/2018  admission. Managed with glipizide 5 mg and Tresiba 36 units at bedtime. Chronic peripheral neuropathy managed with gabapentin 600 mg AM, 600 mg afternoon, and 900 mg HS.  - Held glipizide while NPO.restatr at d/c  - Continue Tresiba.          Chronic atrial fibrillation w/ long term current use of anticoagulant therapy  Rate well controlled with metoprolol ER 75 mg BID. Anticoagulated with warfarin.  .     Hypertension  metoprolol ER 75 mg twice daily and lisinopril 5 mg daily.       CAD  Normal echo 1/10/18, LVEF 55-60%. Angiography 4/2009 shows 50% stenosis of LAD and 40-50% stenosis of RCA.  - Continue metoprolol ER 75 mg daily.     BPH  - Continue tamsulosin.     GERD   Noted in chart, not on medications.     Hyperlipidemia  Noted in chart, not on medications.     Peripheral vascular disease  Noted in chart, not on medications          Discharge Disposition   home    Discharge Orders      GASTROENTEROLOGY ADULT REF CONSULT ONLY      Follow-up and recommended labs and tests     Follow up primary 1 week     Discharge Medications   Current Discharge Medication List      CONTINUE these medications which have NOT CHANGED    Details   gabapentin (NEURONTIN) 300 MG capsule Take 1 capsule (300 mg) by mouth At Bedtime 1 tablet along with his 600mg dose at bedtime. Total gabapentin dose is 600mg/600mg/900mg.  Qty: 90 capsule, Refills: 2    Associated Diagnoses: Peripheral polyneuropathy      gabapentin (NEURONTIN) 600 MG tablet Take 1 tablet (600 mg) by mouth 3 times daily Along with 300mg for a total of 900mg for bedtime dose  Qty: 270 tablet, Refills: 2    Associated Diagnoses: Peripheral polyneuropathy      glipiZIDE (GLUCOTROL XL) 5 MG 24 hr tablet Take 1 tablet (5 mg) by mouth daily  Qty: 90 tablet, Refills: 1    Associated Diagnoses: Type 2 diabetes mellitus with diabetic polyneuropathy, without long-term current use of insulin (H)      insulin degludec (TRESIBA) 200 UNIT/ML pen Take 36 units at bedtime (increase by  2 units every 4 days until am readings are under 120) max dose: 42.  Qty: 9 mL, Refills: 1    Comments: Dose adjustment only, pt will call when needs insulin  Associated Diagnoses: Type 2 diabetes mellitus with diabetic polyneuropathy, without long-term current use of insulin (H)      lisinopril (PRINIVIL/ZESTRIL) 5 MG tablet TAKE 1 TABLET BY MOUTH ONCE DAILY  Qty: 90 tablet, Refills: 1    Associated Diagnoses: Hypertension, benign essential, goal below 140/90      metoprolol succinate (TOPROL-XL) 50 MG 24 hr tablet Take 1.5 tablets (75 mg) by mouth 2 times daily  Qty: 270 tablet, Refills: 3    Associated Diagnoses: Hypertension, benign essential, goal below 140/90      multivitamin, therapeutic with minerals (THERA-VIT-M) TABS Take 1 tablet by mouth daily.  Qty: 30 each, Refills: 1    Associated Diagnoses: Surgery aftercare      tamsulosin (FLOMAX) 0.4 MG capsule TAKE ONE CAPSULE BY MOUTH ONCE DAILY  Qty: 90 capsule, Refills: 2    Associated Diagnoses: Urgency of urination; Hypertrophy of prostate without urinary obstruction      vitamin B-12 (CYANOCOBALAMIN) 100 MCG tablet Take 100 mcg by mouth daily      warfarin (COUMADIN) 2.5 MG tablet TAKE 1 TABLET BY MOUTH ONCE DAILY OR AS DIRECTED BY ACC.  Qty: 90 tablet, Refills: 0    Associated Diagnoses: Chronic atrial fibrillation (H)      ASPIRIN NOT PRESCRIBED (INTENTIONAL) Antiplatelet medication not prescribed intentionally due to Current anticoagulant therapy (warfarin/enoxaparin)      blood glucose (CONTOUR NEXT TEST) test strip Use to test blood sugar 2 times daily or as directed.  Qty: 100 each, Refills: 11    Associated Diagnoses: Type 2 diabetes mellitus with diabetic polyneuropathy, without long-term current use of insulin (H)      insulin pen needle (BD LUIS U/F) 32G X 4 MM miscellaneous Use 1 daily as directed.  Qty: 100 each, Refills: 4    Associated Diagnoses: Type 2 diabetes mellitus with diabetic polyneuropathy, without long-term current use of insulin  (H)           Allergies   Allergies   Allergen Reactions     Nkda [No Known Drug Allergies]        Consultations This Hospital Stay     PHARMACY TO DOSE WARFARIN  CARE TRANSITION RN/SW IP CONSULT  PHARMACY TO DOSE WARFARIN    Significant Results and Procedures   Procedures    Data   Results for orders placed or performed during the hospital encounter of 05/22/19   CT Abd/Pelvis w Contrast*    Narrative    CT ABDOMEN/PELVIS WITH CONTRAST May 22, 2019 1:33 PM     HISTORY: Abdomen infection (include peritonitis).    CONTRAST DOSE: 97 mL Isovue 370.    TECHNIQUE: Radiation dose for this scan was reduced using automated  exposure control, adjustment of the mA and/or kV according to patient  size, or iterative reconstruction technique.    FINDINGS: Linear atelectasis or fibrosis is noted at the right lung  base. Pleural-based nodule is noted posteromedially at the right lung  base, probably unchanged from 11/24/2018. Although larger, this has  been present since 5/2/2016 and is therefore likely benign. The  stomach wall appears to be thickened and somewhat ill-defined which  may represent nonspecific gastritis. Adjacent inflammatory stranding  is noted extending to the second portion of the duodenum and adjacent  to the gallbladder. Small amount of left upper quadrant peritoneal  fluid is noted. The pancreatic tail and spleen are absent. There is no  evidence of bowel obstruction or free peritoneal air. The liver,  pancreatic head, and kidneys appear within normal limits. Multiple  renal cysts are noted, the largest pedunculated in the right lower  pole measuring 3.8 cm. Urinary bladder wall is prominent though this  may be related to lack of distention. Pelvic contents are otherwise  unremarkable.      Impression    IMPRESSION:  1. Splenectomy and partial pancreatectomy.  2. Gastric wall prominence/ill-definition, left upper  quadrant/perigastric fluid, and adjacent inflammatory stranding which  extends along the stomach  to the second portion of the duodenum and  medial aspect of the gallbladder. This has increased, particularly  adjacent to the gallbladder and duodenum since 11/24/2018.  Possibilities include pancreatitis (though the head of the pancreatic  head is otherwise unremarkable in appearance) versus nonspecific  gastritis versus other causes of inflammation.  3. Prominence of the urinary bladder wall which may be related to lack  of distention but is nonspecific.    SARAH SHI MD     Lab Results   Component Value Date    WBC 11.8 (H) 05/23/2019    HGB 13.7 05/23/2019    HCT 44.5 05/23/2019     05/23/2019    CHOL 151 05/22/2019    TRIG 188 (H) 05/22/2019    HDL 31 (L) 05/22/2019    ALT 25 05/22/2019    AST 21 05/22/2019     05/23/2019    BUN 14 05/23/2019    CO2 33 (H) 05/23/2019    TSH 1.58 01/17/2019    PSA 1.72 05/09/2017    INR 2.70 (H) 05/24/2019           Urinalysis- >182 wbc, 20 rbc  ucx- neg    Physical Exam   Temp:  [97.6  F (36.4  C)-98.4  F (36.9  C)] 97.9  F (36.6  C)  Pulse:  [64-76] 69  Resp:  [16-20] 18  BP: ()/(51-61) 111/56  SpO2:  [91 %-94 %] 92 %  Vitals:    05/22/19 0441 05/22/19 0629   Weight: 86.2 kg (190 lb) 90.1 kg (198 lb 10.2 oz)       Constitutional: nad  CV: Regular  Respiratory: CTA bilaterally  GI: Soft, nontender  Skin: Warm and dry      The discharge plan was discussed with the patient       Katie Wyatt

## 2019-05-24 NOTE — PLAN OF CARE
Pt up independently. States has tenderness to LUQ abdomen with palpation but denies need for pain/nausea meds this shift. Voiding in urinal. Afebrile. Ambulated in mosher x1. Pt continues to be NPO. BG checks every 4hrs.

## 2019-05-28 ENCOUNTER — TELEPHONE (OUTPATIENT)
Dept: EDUCATION SERVICES | Facility: CLINIC | Age: 74
End: 2019-05-28

## 2019-05-28 ENCOUNTER — PATIENT OUTREACH (OUTPATIENT)
Dept: CARE COORDINATION | Facility: CLINIC | Age: 74
End: 2019-05-28

## 2019-05-28 DIAGNOSIS — E11.42 TYPE 2 DIABETES MELLITUS WITH DIABETIC POLYNEUROPATHY, WITHOUT LONG-TERM CURRENT USE OF INSULIN (H): Chronic | ICD-10-CM

## 2019-05-28 ASSESSMENT — ACTIVITIES OF DAILY LIVING (ADL): DEPENDENT_IADLS:: INDEPENDENT

## 2019-05-28 NOTE — LETTER
Health Care Home - Access Care Plan    About Me:    Patient Name:  Antoine Russo    YOB: 1945  Age:                      73 year old   Izzy MRN:     [id number] Telephone Information:   Home Phone [phone]   Mobile Not on file.       Address:  [address] [place] [id number] Email address:  [email or web address]      Emergency Contact(s)   Name Relationship Lgl Grd Work Phone Home Phone Mobile Phone   MORE PALAFOX Spouse No  [phone]             Health Maintenance: Routine Health maintenance Reviewed: Due/Overdue   Health Maintenance Due   Topic Date Due     ZOSTER IMMUNIZATION (1 of 2) 12/29/1997     LIPID  05/15/2015     MEDICARE ANNUAL WELLNESS VISIT  04/14/2016     MENINGITIS IMMUNIZATION (2 - Risk 2-dose series) 04/25/2016     FALL RISK ASSESSMENT  06/27/2019     Pt to talk with provider about what is needed or can continue wait.        My Access Plan  Medical Emergency 914   Questions or concerns during clinic hours Primary Clinic Line, I will call the clinic directly: [facility] - [phone]   24 Hour Appointment Line [phone] or  [phone]) (toll free)   24 Hour Nurse Line [phone] (toll free)   Questions or concerns outside clinic hours 24 Hour Appointment Line, I will call the after-hours on-call line:   [facility] [phone] or [phone] ([phone]) (toll-free)   Preferred Urgent Care [facility] [phone]   Preferred [facility] [place] [place]  [phone]   Preferred Pharmacy [facility] - [address]     Behavioral Health Crisis Line The National Suicide Prevention Lifeline at 1-133.377.8666 or 916                     My Care Team Members  Patient Care Team       Relationship Specialty Notifications Start End    Katie Gross MD PCP - General Family Practice  8/10/15     Phone: [phone] Fax:  [phone]         [address] [place] [address]    Andrea García MD MD ENT  2/17/16     Phone: [phone] Pager: [phone] Fax: [phone]        [facility] [address] [facility] [address]    Grabiel Plata MD MD Gastroenterology  2/17/16     Phone: [phone] Fax: [phone]         [address] [facility] [address] [facility] [address]    Marcio Aggarwal MD MD Urology  3/2/17     Phone: [phone] Fax: [phone]         [address] [facility] [address] [facility] [address]    TEJINDER Morales MD MD Urology  3/3/17     Phone: [phone] Pager: [phone] Fax: [phone] [address] [facility] [address]    Lilian Churchill, RN Registered Nurse Urology  3/3/17     Phone: [phone] Pager: [phone]        Katie Gross MD Assigned PCP   4/26/19     Phone: [phone] Fax: [phone] [address] [place] [address]    Coreen Craig, RN Clinic Care Coordinator Primary Care - CC Admissions 5/22/19     Phone: [phone] Fax: [phone]        Anais Guajardo MD MD Neurology Admissions 5/28/19     Phone: [phone] Fax: [phone]         [address] [facility] [address]    Yovanny Beasley MD MD Gastroenterology All results, Admissions 5/28/19     Phone: [phone] Fax: [phone]         [address] 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)     Peripheral vascular disease (H)     Colitis     Groin  fluid collection     Clostridium difficile enterocolitis     Health Care Home     H/O colectomy     IPMN (intraductal papillary mucinous neoplasm)     Pancreatic duct leak     Type 2 diabetes mellitus with diabetic polyneuropathy, without long-term current use of insulin (H)     Left varicocele     Anticipated difficulty with intubation     Spleen absent     Chronic atrial fibrillation (H)     Recurrent pancreatitis (H)     Long term current use of anticoagulant therapy     Acute pancreatitis     Pancreatic pseudocyst      Current Medications and Allergies:  See printed Medication Report

## 2019-05-28 NOTE — PROGRESS NOTES
Clinic Care Coordination Contact  Care Team Conversations    Patient left Care Coordinator RN saurav  stating he was returning her call.     Plan:  Care Coordinator RN to call patient back.    Coreen BANERJEE RN, PHN, [facility]  Primary Care Clinic RN Care Coordinator  [facility]   [email or web address]  Office:  [phone]

## 2019-05-28 NOTE — PROGRESS NOTES
Clinic Care Coordination Contact    Clinic Care Coordination Contact  OUTREACH    Referral Information:  Referral Source: IP Handoff    Primary Diagnosis: GI Disorders(Pancreatitis)    Chief Complaint   Patient presents with     Clinic Care Coordination - Post Hospital     Nurse: d/c from [facility] 5/24/19        Universal Utilization: No concerns at this time.   Clinic Utilization  Difficulty keeping appointments:: No  Compliance Concerns: No  No-Show Concerns: No  No PCP office visit in Past Year: No  Utilization    Last refreshed: 5/28/2019  8:54 AM:  Hospital Admissions 4           Last refreshed: 5/28/2019  8:54 AM:  ED Visits 0           Last refreshed: 5/28/2019  8:54 AM:  No Show Count (past year) 0              Current as of: 5/28/2019  8:54 AM              Clinical Concerns:  Care Coordinator RN spoke with patient, he states he is doing fine. He denies having any questions or concerns regarding his discharge instructions. He is scheduled to see his pancreatic specialist 7/8/19. He is scheduled to see his PCP 7/18/19 but wondering if he needs to see her sooner due to recent hospitalization, Care Coordinator RN will ask PCP care team and get back to him. He denies having any pain. He verbalized he is tolerating food and in fact is more hungry than before. He verbalized he has not had any ETOH since Thanksgiving, he is curious as to why he is having these flares.     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)     Peripheral vascular disease (H)     Colitis     Groin  fluid collection     Clostridium difficile enterocolitis     Health Care Home     H/O colectomy     IPMN (intraductal papillary mucinous neoplasm)     Pancreatic duct leak     Type 2 diabetes mellitus with diabetic polyneuropathy, without long-term current use of insulin (H)     Left varicocele     Anticipated difficulty with intubation     Spleen absent     Chronic atrial fibrillation (H)     Recurrent pancreatitis (H)     Long term current use of anticoagulant therapy     Acute pancreatitis     Pancreatic pseudocyst         Current Behavioral Concerns: Denies having any concerns at this time.     Education Provided to patient: RN CC educated about Care Coordination Services, discharge instructions, medications reviewed and follow up.      Pain  Pain (GOAL):: No  Health Maintenance Reviewed: Due/Overdue   Health Maintenance Due   Topic Date Due     ZOSTER IMMUNIZATION (1 of 2) 12/29/1997     LIPID  05/15/2015     MEDICARE ANNUAL WELLNESS VISIT  04/14/2016     MENINGITIS IMMUNIZATION (2 - Risk 2-dose series) 04/25/2016     FALL RISK ASSESSMENT  06/27/2019       Clinical Pathway: None    Medication Management:  Patient independent in medication management and verbalizes adherence and understanding of medication regimen. Medications reconciled.     Functional Status:  Dependent ADLs:: Ambulation-no assistive device, Independent  Dependent IADLs:: Independent  Bed or wheelchair confined:: No  Mobility Status: Independent  Fallen 2 or more times in the past year?: No  Any fall with injury in the past year?: No    Living Situation:  Current living arrangement:: I live in a private home with spouse  Type of residence:: Private home - stairs    Diet/Exercise/Sleep:  Diet:: Low saturated fat  Inadequate nutrition (GOAL):: No  Food Insecurity: No  Tube Feeding: No  Exercise:: Yes(not able to do much in ice, snow)  Inadequate activity/exercise (GOAL):: No  Significant changes in sleep pattern (GOAL):  No    Transportation:  Transportation concerns (GOAL):: No  Transportation means:: Regular car     Psychosocial:  Methodist or spiritual beliefs that impact treatment:: No  Mental health DX:: No  Mental health management concern (GOAL):: No  Informal Support system:: Spouse, Friends, Family     Financial/Insurance:   Financial/Insurance concerns (GOAL):: No       Resources and Interventions:  Current Resources: RN CC mailed out to patient intro letter, access care plan, consent to communicate and care coordination services brochure.    Community Resources: None  Supplies used at home:: Diabetic Supplies  Equipment Currently Used at Home: cane, straight, walker, rolling, glucometer    Advance Care Plan/Directive  Advanced Care Plans/Directives on file:: No    Referrals Placed: None     Goals:   Goals        General    Monitoring (pt-stated)     Notes - Note created  3/18/2019 11:27 AM by Ruthann Alaniz RD    My Goal: I will check blood sugars twice daily    What I need to meet my goal: start new habits, motivations, BG needed for insulin titration    I plan to meet my goal by this date: next visit in 6 weeks              Patient/Caregiver understanding: yes, patient verbalized understanding.       Future Appointments              In 2 days NB ANTI COAG [facility]    In 1 month Yovanny Beasley MD [facility] Biliary, [facility]    In 1 month Katie Gross MD [facility]    In 4 months Anais Guajardo MD [facility]          Plan:   Patient will continue to follow treatment plan as directed and follow up with PCP with concerns ongoing.   Care Coordinator RN to send message to PCP care team to see if patient needs to be seen.  Care Coordinator RN to f/u with patient in two weeks.    Coreen BANERJEE, RN, PHN, CCM  Primary Care Clinic RN Care Coordinator  [facility]    [email or web address]  Office:  [phone]

## 2019-05-28 NOTE — TELEPHONE ENCOUNTER
Diabetes education contact:    5-16 am 251, pm 95, 5-17 am 272, pm 131, 5-18 am 311, pm 188, 5-19 am 230, pm 248, 5-20 am 276, pm 207, 5-21 am 223, pm 151, 5-22 in hospital, 5-23 hospital, 5-24 am hospital, pm 140, 5-25 am 205, pm 123, 5-26 am 158, pm 74, 5-27 am 260, pm 222...On the hospital dates, I was in for pancreatitis again. On the 26th, at the 74 reading, my knees were feeling a bit wobbly and my hands were shaking. Took a couple of the glucose tablets and ate. I was feeling much better. If you have any ques., give me a call, [phone]. Poli (Bookem)....    Poli,  Increase the Tresiba to 40 units at bedtime.  Make sure you are eating something mid day to help prevent low blood sugars in the evening.  If you have more lower blood sugars in the evening we may need to drop the Glipizide dose down.  We still need to get better am readings.    Ruthann Alaniz RD on 5/28/2019 at 8:09 AM    Any diabetes medication dose changes were made via the CDE Protocol and Collaborative Practice Agreement with the patient's primary care provider. A copy of this encounter was shared with the provider.

## 2019-05-28 NOTE — LETTER
[place] CARE COORDINATION  [place] [id number] [address]  [phone]    May 28, 2019    Antoine Russo  [address] [place] [id number]      Dear Antoine,    I am a clinic care coordinator who works with Katie Gross MD at [facility]. I have been trying to reach you recently to introduce Clinic Care Coordination and to see if there was anything I could assist you with.  I wanted to introduce myself and provide you with my contact information so that you can call me with questions or concerns about your health care. Below is a description of clinic care coordination and how I can further assist you.     The clinic care coordinator is a registered nurse and/or  who understand the health care system. The goal of clinic care coordination is to help you manage your health and improve access to the [facility] system in the most efficient manner. The registered nurse can assist you in meeting your health care goals by providing education, coordinating services, and strengthening the communication among your providers. The  can assist you with financial, behavioral, psychosocial, chemical dependency, counseling, and/or psychiatric resources.    Please feel free to contact me at [phone], with any questions or concerns. We at [facility] are focused on providing you with the highest-quality healthcare experience possible and that all starts with you.     Sincerely,     Coreen BANERJEE, RN, PHN, [facility]  Primary Care Clinic RN Care Coordinator  [facility]   [email or web address]  Office:  [phone]            Enclosed: I have enclosed a copy of a 24 Hour Access Plan. This has helpful phone numbers for you to call when needed. Please keep this in an easy to access place to use as needed.  I have sent a Consent to Communicate form for you to complete (as you wish) to allow us to  "discuss/share your personal health information with whomever you choose on your behalf.   I have highlighted the areas for you to review/address.  Please complete all that apply.  Please check the box for medical information if you allow the clinic to share personal health information with whomever you choose.  This form must be signed and dated to be valid.  If you check all boxes, please be aware that checking \"nothing\" despite checking other boxes will allow no information to be shared.      If you wish not to have information regarding your mental health, chemical health, AIDS/HIV or sickle cell anemia, then please initial the second bullet point at the end of the form.  If you allow this information to be shared with those noted above on the form, then leave this area blank and do not initial.      This form does not , you can make a change to this document at any time.    "

## 2019-05-28 NOTE — PROGRESS NOTES
Clinic Care Coordination Contact  [facility]/Voicemail    Referral Source: IP Handoff    Clinical Data: Care Coordinator Outreach, discharged from [facility] 5/24/19 after being admitted for recurrent pancreatitis.    Outreach attempted x 1.  Left message on voicemail with call back information and requested return call.    Plan: Care Coordinator will mail out care coordination introduction letter with care coordinator contact information and explanation of care coordination services. Care Coordinator will try to reach patient again in 1-2 business days.    Coreen BANERJEE, RN, PHN, [facility]  Primary Care Clinic RN Care Coordinator  [facility]   [email or web address]  Office:  [phone]

## 2019-05-30 ENCOUNTER — ANTICOAGULATION THERAPY VISIT (OUTPATIENT)
Dept: ANTICOAGULATION | Facility: CLINIC | Age: 74
End: 2019-05-30
Payer: COMMERCIAL

## 2019-05-30 DIAGNOSIS — Z79.01 LONG TERM CURRENT USE OF ANTICOAGULANT THERAPY: ICD-10-CM

## 2019-05-30 DIAGNOSIS — I48.20 CHRONIC ATRIAL FIBRILLATION (H): ICD-10-CM

## 2019-05-30 LAB — INR POINT OF CARE: 2.7 (ref 0.86–1.14)

## 2019-05-30 PROCEDURE — 99207 ZZC NO CHARGE NURSE ONLY: CPT

## 2019-05-30 PROCEDURE — 36416 COLLJ CAPILLARY BLOOD SPEC: CPT

## 2019-05-30 PROCEDURE — 85610 PROTHROMBIN TIME: CPT | Mod: QW

## 2019-05-30 NOTE — PROGRESS NOTES
ANTICOAGULATION FOLLOW-UP CLINIC VISIT    Patient Name:  Antoine Russo  Date:  2019  Contact Type:  Face to Face    SUBJECTIVE:  Patient Findings     Positives:   Hospital admission (5-22 to 5-24 for recurrent pancreatitis)    Comments:   No changes in diet, activity level, medications (including over the counter), or health. No missed doses of warfarin. Patient took dosing as prescribed. No signs of clots or bleeding concerns. Patient will continue maintenance warfarin dosing.  We will recheck INR in 7 weeks per patient request. He is seeing PCP in 7 weeks and wants to combine visits.        Clinical Outcomes     Comments:   No changes in diet, activity level, medications (including over the counter), or health. No missed doses of warfarin. Patient took dosing as prescribed. No signs of clots or bleeding concerns. Patient will continue maintenance warfarin dosing.  We will recheck INR in 7 weeks per patient request. He is seeing PCP in 7 weeks and wants to combine visits.           OBJECTIVE    INR Protime   Date Value Ref Range Status   2019 2.7 (A) 0.86 - 1.14 Final       ASSESSMENT / PLAN  INR assessment THER    Recheck INR In: 7 WEEKS    INR Location Clinic      Anticoagulation Summary  As of 2019    INR goal:   2.0-3.0   TTR:   68.0 % (11.2 mo)   INR used for dosin.7 (2019)   Warfarin maintenance plan:   2.5 mg (2.5 mg x 1) every day   Full warfarin instructions:   2.5 mg every day   Weekly warfarin total:   17.5 mg   No change documented:   Aparna Kunz RN   Plan last modified:   Angela Harrell RN (2018)   Next INR check:   2019   Priority:   INR   Target end date:   Indefinite    Indications    Chronic atrial fibrillation (H) [I48.2]  Long term current use of anticoagulant therapy [Z79.01]             Anticoagulation Episode Summary     INR check location:       Preferred lab:       Send INR reminders to:   [facility]    Comments:   * 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

## 2019-06-08 DIAGNOSIS — I10 HYPERTENSION, BENIGN ESSENTIAL, GOAL BELOW 140/90: ICD-10-CM

## 2019-06-10 RX ORDER — METOPROLOL SUCCINATE 50 MG/1
TABLET, EXTENDED RELEASE ORAL
Qty: 270 TABLET | Refills: 3 | Status: SHIPPED | OUTPATIENT
Start: 2019-06-10 | End: 2020-06-08

## 2019-06-11 ENCOUNTER — PATIENT OUTREACH (OUTPATIENT)
Dept: CARE COORDINATION | Facility: CLINIC | Age: 74
End: 2019-06-11

## 2019-06-11 ASSESSMENT — ACTIVITIES OF DAILY LIVING (ADL): DEPENDENT_IADLS:: INDEPENDENT

## 2019-06-11 NOTE — PROGRESS NOTES
Clinic Care Coordination Contact    Clinic Care Coordination Contact  OUTREACH    Referral Information:  Referral Source: IP Handoff    Primary Diagnosis: GI Disorders(Pancreatitis)    Chief Complaint   Patient presents with     Clinic Care Coordination - Follow-up     Nurse: f/u         Universal Utilization: no concerns at this time.   Clinic Utilization  Difficulty keeping appointments:: No  Compliance Concerns: No  No-Show Concerns: No  No PCP office visit in Past Year: No  Utilization    Last refreshed: 6/10/2019  1:21 PM:  Hospital Admissions 4           Last refreshed: 6/10/2019  1:21 PM:  ED Visits 0           Last refreshed: 6/10/2019  1:21 PM:  No Show Count (past year) 0              Current as of: 6/10/2019  1:21 PM              Clinical Concerns:  Care Coordinator RN spoke with patient, he states he is doing fine. He denies having any abdominal pain. He is scheduled to see Dr. Beasley 7/8/19 GI and PCP 7/18/19.     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)     Peripheral vascular disease (H)     Colitis     Groin fluid collection     Clostridium difficile enterocolitis     Health Care Home     H/O colectomy     IPMN (intraductal papillary mucinous neoplasm)     Pancreatic duct leak     Type 2 diabetes mellitus with diabetic polyneuropathy, without long-term current use of insulin (H)     Left varicocele     Anticipated difficulty with intubation     Spleen absent     Chronic atrial fibrillation (H)     Recurrent pancreatitis (H)     Long term current use  of anticoagulant therapy     Acute pancreatitis     Pancreatic pseudocyst         Current Behavioral Concerns: Denies having any concerns.    Education Provided to patient: n/a   Pain  Pain (GOAL):: No  Health Maintenance Reviewed: Due/Overdue   Health Maintenance Due   Topic Date Due     ZOSTER IMMUNIZATION (1 of 2) 12/29/1997     MEDICARE ANNUAL WELLNESS VISIT  04/14/2016     MENINGITIS IMMUNIZATION (2 - Risk 2-dose series) 04/25/2016       Clinical Pathway: None    Medication Management:  Patient independent in medication management and verbalizes adherence and understanding of medication regimen.        Functional Status:  Dependent ADLs:: Ambulation-no assistive device, Independent  Dependent IADLs:: Independent  Bed or wheelchair confined:: No  Mobility Status: Independent  Fallen 2 or more times in the past year?: No  Any fall with injury in the past year?: No    Living Situation:  Current living arrangement:: I live in a private home with spouse  Type of residence:: Private home - stairs    Diet/Exercise/Sleep:  Diet:: Low saturated fat  Inadequate nutrition (GOAL):: No  Food Insecurity: No  Tube Feeding: No  Exercise:: Yes(not able to do much in ice, snow)  Inadequate activity/exercise (GOAL):: No  Significant changes in sleep pattern (GOAL): No    Transportation:  Transportation concerns (GOAL):: No  Transportation means:: Regular car     Psychosocial:  Tenriism or spiritual beliefs that impact treatment:: No  Mental health DX:: No  Mental health management concern (GOAL):: No  Informal Support system:: Spouse, Friends, Family     Financial/Insurance:   Financial/Insurance concerns (GOAL):: No       Resources and Interventions:  Current Resources: n/a  Community Resources: None  Supplies used at home:: Diabetic Supplies  Equipment Currently Used at Home: cane, straight, walker, rolling, glucometer    Advance Care Plan/Directive  Advanced Care Plans/Directives on file:: No    Referrals Placed: None      Goals:   Goals        General    Monitoring (pt-stated)     Notes - Note created  3/18/2019 11:27 AM by Ruthann Alaniz RD    My Goal: I will check blood sugars twice daily    What I need to meet my goal: start new habits, motivations, BG needed for insulin titration    I plan to meet my goal by this date: next visit in 6 weeks              Patient/Caregiver understanding: yes    Outreach Frequency: 2 weeks  Future Appointments              In 3 weeks Yovanny Beasley MD [facility] Pancreas [facility] Biliary, [facility]    In 1 month Katie Gross MD [facility]    In 1 month NB ANTI COAG [facility]    In 4 months Anais Guajardo MD [facility]          Plan:   Patient will continue to follow treatment plan as directed and follow up with PCP with concerns ongoing.   Patient to attend his GI OV on 7/8/19.  Care Coordinator RN to f/u with patient after GI OV.    Coreen BANERJEE RN, PHN, [facility]  Primary Care Clinic RN Care Coordinator  [facility]   [email or web address]  Office:  [phone]

## 2019-06-18 ENCOUNTER — TELEPHONE (OUTPATIENT)
Dept: EDUCATION SERVICES | Facility: CLINIC | Age: 74
End: 2019-06-18

## 2019-06-18 DIAGNOSIS — E11.42 TYPE 2 DIABETES MELLITUS WITH DIABETIC POLYNEUROPATHY, WITHOUT LONG-TERM CURRENT USE OF INSULIN (H): Chronic | ICD-10-CM

## 2019-06-18 NOTE — TELEPHONE ENCOUNTER
Diabetes education contact:    Ruthann, here we go again...6-3 am/pm 291/89, 6-4 177/204 (late lunch), 6-5 229/93, 6-6 275/192 (after a salad w/dressing), 6-7 00ps forgot am/227, 6-8 273/213, 6-9 292/182, 6-10 299/183. Don't know why pm numbers have crept up...Poli Monge,  Increase your Tresiba dose to 46 units and send again in one week.  Ruthann Alaniz RD, CDE    Any diabetes medication dose changes were made via the CDE Protocol and Collaborative Practice Agreement with the patient's primary care provider. A copy of this encounter was shared with the provider.

## 2019-07-08 ENCOUNTER — OFFICE VISIT (OUTPATIENT)
Dept: GASTROENTEROLOGY | Facility: CLINIC | Age: 74
End: 2019-07-08
Payer: COMMERCIAL

## 2019-07-08 VITALS
BODY MASS INDEX: 29.59 KG/M2 | WEIGHT: 206.7 LBS | DIASTOLIC BLOOD PRESSURE: 71 MMHG | HEART RATE: 100 BPM | HEIGHT: 70 IN | SYSTOLIC BLOOD PRESSURE: 135 MMHG | OXYGEN SATURATION: 95 %

## 2019-07-08 DIAGNOSIS — K85.91 ACUTE PANCREATITIS WITH UNINFECTED NECROSIS, UNSPECIFIED PANCREATITIS TYPE: Primary | ICD-10-CM

## 2019-07-08 ASSESSMENT — MIFFLIN-ST. JEOR: SCORE: 1698.84

## 2019-07-08 ASSESSMENT — PAIN SCALES - GENERAL: PAINLEVEL: NO PAIN (0)

## 2019-07-08 NOTE — NURSING NOTE
"Chief Complaint   Patient presents with     RECHECK     pt here for follow up after hospitalization in [facility] for pancreatitis       Vitals:    07/08/19 1357   BP: 135/71   BP Location: Left arm   Patient Position: Chair   Cuff Size: Adult Regular   Pulse: 100   SpO2: 95%   Weight: 93.8 kg (206 lb 11.2 oz)   Height: 1.778 m (5' 10\")       Body mass index is 29.66 kg/m .    Bo Cortés, EMT    "

## 2019-07-08 NOTE — LETTER
7/8/2019       RE: Antoine Russo  [address] [address]     Dear Colleague,    Thank you for referring your patient, Antoine Russo, to the [facility]CREAS [facility] BILI[facility] at [facility]. Please see a copy of my visit note below.    He is a 73-year-old who had recurrent acute pancreatitis and a peripancreatic small fluid collection after distal pancreatectomy some years ago that was initially complicated by pancreatic duct leak.  In March we did combined EUS/ERCP, aspirated the cyst and then placed a pancreatic biliary sphincterotomy.  Interestingly he got readmitted with severe pancreatitis with peripancreatic necrosis that resolved slowly and got bubbles in it transiently, a stent.  He is actually doing very well now.  He is eating, on pancreatic enzymes, regained some of his weight, but plateaued and having no pain issues.  He has not had an alcoholic drink since November, which no doubt was a contributing factor.  He quit smoking 12 years ago. Readmitted in May w another attack of pancreatitis, without necrosis, but with dramatic peripancreatic fluid.  Now totally asymptomatic. Did not take Creon due to cost. No loose stools.  IMP: 10 minutes more than 50% counseling. 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 Since no symptoms in last 6 weeks, would not pursue now. However, if and when he has another episode, would have him go to [facility] for hospitalization, and then once resolved repeat secretin MRCP down here to assess for recurrent leak. Then consider more aggressive ERCP if suggestive. Pt understands. No reason to take Creon at present.         Again, thank you for allowing me to participate in the care of your patient.      Sincerely,    Yovanny Beasley MD

## 2019-07-08 NOTE — PROGRESS NOTES
He is a 73-year-old who had recurrent acute pancreatitis and a peripancreatic small fluid collection after distal pancreatectomy some years ago that was initially complicated by pancreatic duct leak.  In March we did combined EUS/ERCP, aspirated the cyst and then placed a pancreatic biliary sphincterotomy.  Interestingly he got readmitted with severe pancreatitis with peripancreatic necrosis that resolved slowly and got bubbles in it transiently, a stent.  He is actually doing very well now.  He is eating, on pancreatic enzymes, regained some of his weight, but plateaued and having no pain issues.  He has not had an alcoholic drink since November, which no doubt was a contributing factor.  He quit smoking 12 years ago. Readmitted in May w another attack of pancreatitis, without necrosis, but with dramatic peripancreatic fluid.  Now totally asymptomatic. Did not take Creon due to cost. No loose stools.  IMP: 10 minutes more than 50% counseling. 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 Since no symptoms in last 6 weeks, would not pursue now. However, if and when he has another episode, would have him go to [facility] for hospitalization, and then once resolved repeat secretin MRCP down here to assess for recurrent leak. Then consider more aggressive ERCP if suggestive. Pt understands. No reason to take Creon at present.

## 2019-07-09 ENCOUNTER — PATIENT OUTREACH (OUTPATIENT)
Dept: CARE COORDINATION | Facility: CLINIC | Age: 74
End: 2019-07-09

## 2019-07-09 ENCOUNTER — TELEPHONE (OUTPATIENT)
Dept: EDUCATION SERVICES | Facility: CLINIC | Age: 74
End: 2019-07-09

## 2019-07-09 ENCOUNTER — ALLIED HEALTH/NURSE VISIT (OUTPATIENT)
Dept: EDUCATION SERVICES | Facility: CLINIC | Age: 74
End: 2019-07-09
Payer: COMMERCIAL

## 2019-07-09 DIAGNOSIS — E11.42 TYPE 2 DIABETES MELLITUS WITH DIABETIC POLYNEUROPATHY, WITHOUT LONG-TERM CURRENT USE OF INSULIN (H): Chronic | ICD-10-CM

## 2019-07-09 PROCEDURE — G0108 DIAB MANAGE TRN  PER INDIV: HCPCS | Performed by: DIETITIAN, REGISTERED

## 2019-07-09 ASSESSMENT — ACTIVITIES OF DAILY LIVING (ADL): DEPENDENT_IADLS:: INDEPENDENT

## 2019-07-09 NOTE — TELEPHONE ENCOUNTER
Dr. Martino,    Started Dan on rapid acting insulin at meals as we discussed.  Humalog 6 units plus the medium sliding scale.    Please reply we discussed and you are in agreement.    Thanks!    Ruthann Alaniz RD, CDE

## 2019-07-09 NOTE — TELEPHONE ENCOUNTER
Diabetes education contact:    Pt emailed in numbers:    Here we go:  7-1 am 230 pm190, 7-2 am 215 pm 200, 7-3 am 306 pm 120, 7-4 am 222 pm 246, 7-5 am 307 pm 180, 7-6 am 321 pm 214, 7-7 am 289 pm 246, 7-8 am 316 pm 250. Still doing the 50 units    Bookem,  Your blood sugars in the am are right after getting up with no food or coffee correct?  I am surprised that they have not come down yet. Go up to 54 units and I will see you soon.  Ruthann        Pt to be seen 7/22, I question if he needs meal time insulin.  Numbers are not dropping in am even with increases.  Will also assess his insulin giving technique to make sure he is doing it correctly.  I had an appt slot open up today in afternoon will call pt to see if he can come in sooner.    Ruthann Alaniz RD on 7/9/2019 at 7:50 AM    Any diabetes medication dose changes were made via the CDE Protocol and Collaborative Practice Agreement with the patient's primary care provider. A copy of this encounter was shared with the provider.          Ruthann Alaniz RD, CDE

## 2019-07-09 NOTE — PATIENT INSTRUCTIONS
1.  Start taking Novolog 6 units plus the medium sliding scale right before your meals.     Keep the Tresiba at 50 units at bedtime.     STOP taking the glipizide    I am off next week, email me again on July 23rd.    Carry glucose tablets with you, if you have a low blood sugar you need to chew 3-4 tablets.

## 2019-07-09 NOTE — PROGRESS NOTES
"Diabetes Self-Management Education & Support    Diabetes Education Self Management & Training    SUBJECTIVE/OBJECTIVE:     Cultural Influences/Ethnic Background:  American      Diabetes Symptoms & Complications          Patient Problem List and Family Medical History reviewed for relevant medical history, current medical status, and diabetes risk factors.    Vitals:  There were no vitals taken for this visit.  Estimated body mass index is 29.66 kg/m  as calculated from the following:    Height as of 7/8/19: 1.778 m (5' 10\").    Weight as of 7/8/19: 93.8 kg (206 lb 11.2 oz).   Last 3 BP:   BP Readings from Last 3 Encounters:   07/08/19 135/71   05/24/19 120/52   05/06/19 128/66       History   Smoking Status     Former Smoker     Packs/day: 1.00     Years: 40.00     Types: Cigarettes     Quit date: 11/24/2006   Smokeless Tobacco     Never Used       Labs:  Lab Results   Component Value Date    A1C 11.6 04/16/2019     Lab Results   Component Value Date     05/23/2019     Lab Results   Component Value Date    LDL 82 05/22/2019     HDL Cholesterol   Date Value Ref Range Status   05/22/2019 31 (L) >39 mg/dL Final   ]  GFR Estimate   Date Value Ref Range Status   05/23/2019 63 >60 mL/min/[1.73_m2] Final     Comment:     Non  GFR Calc  Starting 12/18/2018, serum creatinine based estimated GFR (eGFR) will be   calculated using the Chronic Kidney Disease Epidemiology Collaboration   (CKD-EPI) equation.       GFR Estimate If Black   Date Value Ref Range Status   05/23/2019 74 >60 mL/min/[1.73_m2] Final     Comment:      GFR Calc  Starting 12/18/2018, serum creatinine based estimated GFR (eGFR) will be   calculated using the Chronic Kidney Disease Epidemiology Collaboration   (CKD-EPI) equation.       Lab Results   Component Value Date    CR 1.15 05/23/2019     No results found for: MICROALBUMIN    Healthy Eating   Eats two meals a day  potstickers or chicken nuggets or two egg " sandwiches  Supper- special from the [employer]. Usually has a bunch of potatoes with it    Being Active   does what he can, cleaning his house, cutting grass    Monitoring           Taking Medications  Diabetes Medication(s)     Insulin       insulin degludec (TRESIBA) 200 UNIT/ML pen    Take 46 units at bedtime (increase by 2 units every 4 days until am readings are under 120) max dose: 52.    Sulfonylureas       glipiZIDE (GLUCOTROL XL) 5 MG 24 hr tablet    Take 1 tablet (5 mg) by mouth daily               Problem Solving     Not assessed            Reducing Risks   not assessed    Healthy Coping     Patient Activation Measure Survey Score:  RIDDHI Score (Last Two) 2/15/2011   RIDDHI Raw Score 41   Activation Score 63.2   RIDDHI Level 3       ASSESSMENT:  Needs meal time insulin added. Discussed with Dr Martino and she is in agreement.  Will start Novolog 6 units plus medium sliding scale before his meals.  Went over hypoglycemia.        Patient's most recent   Lab Results   Component Value Date    A1C 11.6 04/16/2019    is not meeting goal of <8.0    INTERVENTION:   Diabetes knowledge and skills assessment:     Patient is knowledgeable in diabetes management concepts related to: Healthy Eating, Being Active, Monitoring and Taking Medication    Patient needs further education on the following diabetes management concepts: Healthy Eating, Being Active, Monitoring, Taking Medication, Problem Solving, Reducing Risks and Healthy Coping    Based on learning assessment above, most appropriate setting for further diabetes education would be: Individual setting.    Education provided today on:  AADE Self-Care Behaviors:  Monitoring: individual blood glucose targets and frequency of monitoring  Taking Medication: action of prescribed medication, drawing up, administering and storing injectable diabetes medications, proper site selection and rotation for injections, side effects of prescribed medications and when to take  medications  Problem Solving: low blood glucose - causes, signs/symptoms, treatment and prevention and carrying a carbohydrate source at all times    Opportunities for ongoing education and support in diabetes-self management were discussed.    Pt verbalized understanding of concepts discussed and recommendations provided today.       Education Materials Provided:  humalog Insulin information    PLAN:  See Patient Instructions for co-developed, patient-stated behavior change goals.  AVS printed and provided to patient today. See Follow-Up section for recommended follow-up.      Time Spent: 50 minutes  Encounter Type: Individual    Any diabetes medication dose changes were made via the CDE Protocol and Collaborative Practice Agreement with the patient's primary care provider. A copy of this encounter was shared with the provider.

## 2019-07-09 NOTE — PROGRESS NOTES
"Clinic Care Coordination Contact    Clinic Care Coordination Contact  OUTREACH    Referral Information:  Referral Source: IP Handoff    Primary Diagnosis: GI Disorders(Pancreatitis)    Chief Complaint   Patient presents with     Clinic Care Coordination - Follow-up     Nurse: f/u        Universal Utilization: No concerns at this time.   Clinic Utilization  Difficulty keeping appointments:: No  Compliance Concerns: No  No-Show Concerns: No  No PCP office visit in Past Year: No  Utilization    Last refreshed: 7/9/2019  8:30 AM:  Hospital Admissions 4           Last refreshed: 7/9/2019  8:30 AM:  ED Visits 0           Last refreshed: 7/9/2019  8:30 AM:  No Show Count (past year) 0              Current as of: 7/9/2019  8:30 AM              Clinical Concerns:  Care Coordinator RN spoke with patient for follow up, he states he is doing fine. He states he saw Dr. Beasley yesterday felt like it was a \"wasted trip\", no medication changes made. Patient has been doing well for the last 6 weeks. Discussed that if he had recurrent episode would need to reassess for recurrent leak and consider more aggressive ERCP. Patient no longer needing Creon at this time.    Care Coordinator RN did inform him of Care Coordinator RN resignation from the department, he verbalized understanding and agreed to ask PCP for help when he needs it again.     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)     Peripheral vascular disease (H)     Colitis     Groin fluid collection     Clostridium difficile " enterocolitis     Health Care Home     H/O colectomy     IPMN (intraductal papillary mucinous neoplasm)     Pancreatic duct leak     Type 2 diabetes mellitus with diabetic polyneuropathy, without long-term current use of insulin (H)     Left varicocele     Anticipated difficulty with intubation     Spleen absent     Chronic atrial fibrillation (H)     Recurrent pancreatitis     Long term current use of anticoagulant therapy     Acute pancreatitis     Pancreatic pseudocyst         Current Behavioral Concerns: Denies having any concerns at this time.     Education Provided to patient: n/a   Pain  Pain (GOAL):: No  Health Maintenance Reviewed: Due/Overdue   Health Maintenance Due   Topic Date Due     ZOSTER IMMUNIZATION (1 of 2) 12/29/1997     MEDICARE ANNUAL WELLNESS VISIT  04/14/2016     MENINGITIS IMMUNIZATION (2 - Risk 2-dose series) 04/25/2016       Clinical Pathway: None    Medication Management:  Patient independent in medication management and verbalizes adherence and understanding of medication regimen.        Functional Status:  Dependent ADLs:: Ambulation-no assistive device, Independent  Dependent IADLs:: Independent  Bed or wheelchair confined:: No  Mobility Status: Independent    Living Situation:  Current living arrangement:: I live in a private home with spouse  Type of residence:: Private home - stairs    Diet/Exercise/Sleep:  Diet:: Low saturated fat  Inadequate nutrition (GOAL):: No  Food Insecurity: No  Tube Feeding: No  Exercise:: Yes(not able to do much in ice, snow)  Inadequate activity/exercise (GOAL):: No  Significant changes in sleep pattern (GOAL): No    Transportation:  Transportation concerns (GOAL):: No  Transportation means:: Regular car     Psychosocial:  Yazidi or spiritual beliefs that impact treatment:: No  Mental health DX:: No  Mental health management concern (GOAL):: No  Informal Support system:: Spouse, Friends, Family     Financial/Insurance:   Financial/Insurance concerns  (GOAL):: No       Resources and Interventions:  Current Resources: n/a  Community Resources: None  Supplies used at home:: Diabetic Supplies  Equipment Currently Used at Home: cane, straight, walker, rolling, glucometer    Advance Care Plan/Directive  Advanced Care Plans/Directives on file:: No    Referrals Placed: None     Goals:   Goals        General    Monitoring (pt-stated)     Notes - Note created  3/18/2019 11:27 AM by Ruthann Alaniz RD    My Goal: I will check blood sugars twice daily    What I need to meet my goal: start new habits, motivations, BG needed for insulin titration    I plan to meet my goal by this date: next visit in 6 weeks               Future Appointments              In 1 week Katie Gross MD [facility]    In 1 week NB ANTI COAG [facility]    In 1 week Ruthann Alaniz RD [facility]    In 3 months Anais Guajardo MD [facility]          Plan:   No further Care Coordination needs identified at this time. Patient may be referred to Care Coordination in the future if additional needs arise.  Pt encouraged to contact Care Coordinator through the clinic if situation changes and assistance is needed. No follow-up planned.    Coreen BANERJEE RN, PHN, CCM  Primary Care Clinic RN Care Coordinator  [facility]   [email or web address]  Office:  [phone]

## 2019-07-18 ENCOUNTER — ANTICOAGULATION THERAPY VISIT (OUTPATIENT)
Dept: ANTICOAGULATION | Facility: CLINIC | Age: 74
End: 2019-07-18
Payer: COMMERCIAL

## 2019-07-18 ENCOUNTER — HOSPITAL ENCOUNTER (EMERGENCY)
Facility: CLINIC | Age: 74
Discharge: HOME | End: 2019-07-18
Attending: EMERGENCY MEDICINE | Admitting: EMERGENCY MEDICINE
Payer: COMMERCIAL

## 2019-07-18 ENCOUNTER — APPOINTMENT (OUTPATIENT)
Dept: GENERAL RADIOLOGY | Facility: CLINIC | Age: 74
End: 2019-07-18
Attending: EMERGENCY MEDICINE
Payer: COMMERCIAL

## 2019-07-18 ENCOUNTER — OFFICE VISIT (OUTPATIENT)
Dept: FAMILY MEDICINE | Facility: CLINIC | Age: 74
End: 2019-07-18
Payer: COMMERCIAL

## 2019-07-18 VITALS
TEMPERATURE: 98.1 F | OXYGEN SATURATION: 94 % | SYSTOLIC BLOOD PRESSURE: 142 MMHG | HEIGHT: 70 IN | WEIGHT: 195 LBS | RESPIRATION RATE: 18 BRPM | HEART RATE: 76 BPM | BODY MASS INDEX: 27.92 KG/M2 | DIASTOLIC BLOOD PRESSURE: 83 MMHG

## 2019-07-18 VITALS
RESPIRATION RATE: 20 BRPM | OXYGEN SATURATION: 96 % | BODY MASS INDEX: 28.41 KG/M2 | DIASTOLIC BLOOD PRESSURE: 82 MMHG | HEART RATE: 71 BPM | TEMPERATURE: 97.6 F | WEIGHT: 198 LBS | SYSTOLIC BLOOD PRESSURE: 124 MMHG

## 2019-07-18 DIAGNOSIS — Z79.01 LONG TERM CURRENT USE OF ANTICOAGULANT THERAPY: ICD-10-CM

## 2019-07-18 DIAGNOSIS — Z13.89 SCREENING FOR DIABETIC PERIPHERAL NEUROPATHY: ICD-10-CM

## 2019-07-18 DIAGNOSIS — I48.20 CHRONIC ATRIAL FIBRILLATION (H): ICD-10-CM

## 2019-07-18 DIAGNOSIS — M54.42 ACUTE RIGHT-SIDED LOW BACK PAIN WITH LEFT-SIDED SCIATICA: ICD-10-CM

## 2019-07-18 DIAGNOSIS — E11.42 TYPE 2 DIABETES MELLITUS WITH DIABETIC POLYNEUROPATHY, WITHOUT LONG-TERM CURRENT USE OF INSULIN (H): Primary | ICD-10-CM

## 2019-07-18 DIAGNOSIS — M48.061 SPINAL STENOSIS OF LUMBAR REGION, UNSPECIFIED WHETHER NEUROGENIC CLAUDICATION PRESENT: ICD-10-CM

## 2019-07-18 DIAGNOSIS — I10 HYPERTENSION, BENIGN ESSENTIAL, GOAL BELOW 140/90: ICD-10-CM

## 2019-07-18 DIAGNOSIS — K85.91 NECROTIZING PANCREATITIS: ICD-10-CM

## 2019-07-18 DIAGNOSIS — M54.42 ACUTE MIDLINE LOW BACK PAIN WITH LEFT-SIDED SCIATICA: ICD-10-CM

## 2019-07-18 LAB
HBA1C MFR BLD: 9.7 % (ref 0–5.6)
INR PPP: 1.42 (ref 0.86–1.14)

## 2019-07-18 PROCEDURE — 83036 HEMOGLOBIN GLYCOSYLATED A1C: CPT | Performed by: FAMILY MEDICINE

## 2019-07-18 PROCEDURE — 99284 EMERGENCY DEPT VISIT MOD MDM: CPT | Mod: Z6 | Performed by: EMERGENCY MEDICINE

## 2019-07-18 PROCEDURE — 99283 EMERGENCY DEPT VISIT LOW MDM: CPT | Performed by: EMERGENCY MEDICINE

## 2019-07-18 PROCEDURE — 85610 PROTHROMBIN TIME: CPT | Performed by: FAMILY MEDICINE

## 2019-07-18 PROCEDURE — 99214 OFFICE O/P EST MOD 30 MIN: CPT | Performed by: FAMILY MEDICINE

## 2019-07-18 PROCEDURE — 25000132 ZZH RX MED GY IP 250 OP 250 PS 637: Performed by: EMERGENCY MEDICINE

## 2019-07-18 PROCEDURE — 99207 ZZC NO CHARGE NURSE ONLY: CPT

## 2019-07-18 PROCEDURE — 36415 COLL VENOUS BLD VENIPUNCTURE: CPT | Performed by: FAMILY MEDICINE

## 2019-07-18 PROCEDURE — 99207 C FOOT EXAM  NO CHARGE: CPT | Performed by: FAMILY MEDICINE

## 2019-07-18 PROCEDURE — 72100 X-RAY EXAM L-S SPINE 2/3 VWS: CPT

## 2019-07-18 RX ORDER — METHOCARBAMOL 750 MG/1
750-1500 TABLET, FILM COATED ORAL 4 TIMES DAILY PRN
Qty: 30 TABLET | Refills: 0 | Status: SHIPPED | OUTPATIENT
Start: 2019-07-18 | End: 2019-10-17

## 2019-07-18 RX ORDER — ACETAMINOPHEN 500 MG
1000 TABLET ORAL ONCE
Status: COMPLETED | OUTPATIENT
Start: 2019-07-18 | End: 2019-07-18

## 2019-07-18 RX ORDER — MORPHINE SULFATE 15 MG/1
15-30 TABLET ORAL EVERY 4 HOURS PRN
Qty: 8 TABLET | Refills: 0 | Status: SHIPPED | OUTPATIENT
Start: 2019-07-18 | End: 2019-10-17

## 2019-07-18 RX ORDER — METHOCARBAMOL 500 MG/1
1000 TABLET, FILM COATED ORAL ONCE
Status: COMPLETED | OUTPATIENT
Start: 2019-07-18 | End: 2019-07-18

## 2019-07-18 RX ORDER — MORPHINE SULFATE 15 MG/1
15 TABLET ORAL ONCE
Status: COMPLETED | OUTPATIENT
Start: 2019-07-18 | End: 2019-07-18

## 2019-07-18 RX ORDER — ACETAMINOPHEN 500 MG
1000 TABLET ORAL EVERY 8 HOURS PRN
Qty: 100 TABLET | Refills: 0 | COMMUNITY
Start: 2019-07-18 | End: 2019-10-17

## 2019-07-18 RX ORDER — WARFARIN SODIUM 2.5 MG/1
TABLET ORAL
Qty: 90 TABLET | Refills: 0
Start: 2019-07-18 | End: 2019-08-22

## 2019-07-18 RX ADMIN — MORPHINE SULFATE 15 MG: 15 TABLET ORAL at 03:45

## 2019-07-18 RX ADMIN — ACETAMINOPHEN 1000 MG: 500 TABLET, FILM COATED ORAL at 03:45

## 2019-07-18 RX ADMIN — METHOCARBAMOL 1000 MG: 500 TABLET, FILM COATED ORAL at 03:46

## 2019-07-18 ASSESSMENT — ENCOUNTER SYMPTOMS
WOUND: 0
LIGHT-HEADEDNESS: 0
FEVER: 0
CHEST TIGHTNESS: 0
FATIGUE: 0
ABDOMINAL PAIN: 0
COUGH: 0
CHILLS: 0
HEADACHES: 0
BACK PAIN: 1
NUMBNESS: 0
NECK PAIN: 0
NECK STIFFNESS: 0
SHORTNESS OF BREATH: 0
WEAKNESS: 0
APPETITE CHANGE: 0

## 2019-07-18 ASSESSMENT — MIFFLIN-ST. JEOR: SCORE: 1645.76

## 2019-07-18 NOTE — NURSING NOTE
"Chief Complaint   Patient presents with     Diabetes     recheck       Initial /82 (BP Location: Right arm)   Pulse 71   Temp 97.6  F (36.4  C) (Tympanic)   Resp 20   Wt 89.8 kg (198 lb)   SpO2 96%   BMI 28.41 kg/m   Estimated body mass index is 28.41 kg/m  as calculated from the following:    Height as of an earlier encounter on 7/18/19: 1.778 m (5' 10\").    Weight as of this encounter: 89.8 kg (198 lb).    Patient presents to the clinic using 9+ Maintenance that is potentially due pending provider review:  NONE    n/a    Is there anyone who you would like to be able to receive your results? No  If yes have patient fill out VIDYA    "

## 2019-07-18 NOTE — PROGRESS NOTES
ANTICOAGULATION FOLLOW-UP CLINIC VISIT    Patient Name:  Antoine Russo  Date:  2019  Contact Type:  Telephone/ spoke with Poli on phone. SafedoXhart also sent.    SUBJECTIVE:  Patient Findings     Positives:   Emergency department visit, Change in medications (glipizide stopped and novalog started 19), Other complaints (sciatica pain)    Comments:   Pt in ED early this AM for sciatica pain. He saw PCP this morning also as a follow up and will be having an MRI early next week. Glipizide was stopped one week ago. This could be contributing to sub therapeutic INR now. Patient denies any missed doses of warfarin or changes in diet/increased greens. No change in activity. Patient requested his dosing instructions be sent via [employer]. Recheck INR in 1 week.        Clinical Outcomes     Comments:   Pt in ED early this AM for sciatica pain. He saw PCP this morning also as a follow up and will be having an MRI early next week. Glipizide was stopped one week ago. This could be contributing to sub therapeutic INR now. Patient denies any missed doses of warfarin or changes in diet/increased greens. No change in activity. Patient requested his dosing instructions be sent via [employer]. Recheck INR in 1 week.           OBJECTIVE    INR   Date Value Ref Range Status   2019 1.42 (H) 0.86 - 1.14 Final       ASSESSMENT / PLAN  INR assessment SUB    Recheck INR In: 1 WEEK    INR Location Clinic lab     Anticoagulation Summary  As of 2019    INR goal:   2.0-3.0   TTR:   66.3 % (1.1 y)   INR used for dosin.42! (2019)   Warfarin maintenance plan:   3.75 mg (2.5 mg x 1.5) every Mon, Thu; 2.5 mg (2.5 mg x 1) all other days   Full warfarin instructions:   : 5 mg; Otherwise 3.75 mg every Mon, Thu; 2.5 mg all other days   Weekly warfarin total:   20 mg   Plan last modified:   Aparna Kunz RN (2019)   Next INR check:   2019   Priority:   INR   Target end date:       Indications    Chronic atrial  fibrillation (H) [I48.2]  Long term current use of anticoagulant therapy [Z79.01]             Anticoagulation Episode Summary     INR check location:       Preferred lab:       Send INR reminders to:   [facility]    Comments:   * 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

## 2019-07-18 NOTE — ED AVS SNAPSHOT
[facility] Emergency Department  [address] [address]  Phone:  [phone]  Fax:  [phone]                                    Antoine Russo   MRN: [id number]    Department:  [facility] Emergency Department   Date of Visit:  7/18/2019           After Visit Summary Signature Page    I have received my discharge instructions, and my questions have been answered. I have discussed any challenges I see with this plan with the nurse or doctor.    ..........................................................................................................................................  Patient/Patient Representative Signature      ..........................................................................................................................................  Patient Representative Print Name and Relationship to Patient    ..................................................               ................................................  Date                                   Time    ..........................................................................................................................................  Reviewed by Signature/Title    ...................................................              ..............................................  Date                                               Time          22EPIC Rev 08/18

## 2019-07-18 NOTE — ED NOTES
Pt states that he has had low back pain all day and has a hx of sciatica. He has no pain unless he moves. He is unable to walk.  Pain radiates to the L leg. He was adjusted at the chiropractor.

## 2019-07-18 NOTE — ED NOTES
Pt will rest in room until he can call wife around 0700 for transport from ED. Pt has clinic appt this morning here at facility. MD and pt discussed plan to sleep in ED to give time for medications to kick in and transport to be arranged at that time. Pt denies other needs at this time.

## 2019-07-18 NOTE — ED NOTES
Pts wife on way to pick pt up. Will come into ED to give pt clothing to change into for appointment. Wheelchair at bedside. Discharge has been discussed/reviewed with pt.

## 2019-07-18 NOTE — PROGRESS NOTES
Subjective     Antoine Russo is a 73 year old male who presents to clinic today for the following health issues:    HPI     EMERGENCY DEPARTMENT follow up  Was seen early this am with acute low back pain that radiated into his left thigh  So bad that he had to crawl to the bathroom. Ended up taking the ambulance to the emergency department.   Xray lumbar spine:   Recent Results (from the past 24 hour(s))   Lumbar spine XR, 2-3 views    Narrative    LUMBAR SPINE 3 VIEWS  7/18/2019 4:25 AM     HISTORY: Low back pain with sciatica.    COMPARISON: None.      Impression    IMPRESSION:  1. No visualized acute fracture, malalignment or other acute osseous  abnormality of the lumbar spine.  2. Moderate degenerative changes in the lumbar spine.  3. Atherosclerotic calcification in the abdominal aorta.    ANTONIO NAM MD     They recommended he see me and consider physical therapy initially. He does have a history of cancer.   He is a little better, if doesn't move he is ok.   No bowel or bladder problems    Diabetes Follow-up      How often are you checking your blood sugar? Two times daily    What time of day are you checking your blood sugars (select all that apply)?  Before meals    Have you had any blood sugars above 200?  Yes sometimes    Have you had any blood sugars below 70?  No    What symptoms do you notice when your blood sugar is low?  None    What concerns do you have today about your diabetes? None     Do you have any of these symptoms? (Select all that apply)  No numbness or tingling in feet.  No redness, sores or blisters on feet.  No complaints of excessive thirst.  No reports of blurry vision.  No significant changes to weight.     Have you had a diabetic eye exam in the last 12 months? Yes- Date of last eye exam: 11/14/2018    Diabetes Management Resources    Lab Results   Component Value Date    A1C 11.6 04/16/2019    A1C 13.0 03/12/2019    A1C 7.6 11/23/2018    A1C 6.9 10/22/2018    A1C 7.1  08/23/2018      is seeing Ruthann to get his numbers down  Has been a challenge, has gotten much worse after his bouts of pancreatitis  On Tresiba and lispro    Hypertension Follow-up      Do you check your blood pressure regularly outside of the clinic? No     Are you following a low salt diet? No    Are your blood pressures ever more than 140 on the top number (systolic) OR more   than 90 on the bottom number (diastolic), for example 140/90? No    On lisinopril, metoprolol  BP Readings from Last 2 Encounters:   07/18/19 124/82   07/18/19 142/83     Hemoglobin A1C (%)   Date Value   04/16/2019 11.6 (H)   03/12/2019 13.0 (H)     LDL Cholesterol Calculated (mg/dL)   Date Value   05/22/2019 82   10/22/2018 90       Amount of exercise or physical activity: None    Problems taking medications regularly: No    Medication side effects: none    Diet: diabetic    Recurrent pancreatitis-just saw Dr Beasley 7/8  From his note:   Has recurrent episodes of acute pancreatitis with extensive peripancreatic fluid, suspicious for recurrent pancreatic stump leak after distal pancreatectomy. Since no symptoms in last 6 weeks, would not pursue now. However, if and when he has another episode, would have him go to [facility] for hospitalization, and then once resolved repeat secretin MRCP down here to assess for recurrent leak. Then consider more aggressive ERCP if suggestive    afib-on warfarin and metoprolol  BP Readings from Last 3 Encounters:   07/18/19 124/82   07/18/19 142/83   07/08/19 135/71    Wt Readings from Last 3 Encounters:   07/18/19 89.8 kg (198 lb)   07/18/19 88.5 kg (195 lb)   07/08/19 93.8 kg (206 lb 11.2 oz)              Reviewed and updated as needed this visit by Provider         Review of Systems   ROS: 5 point ROS negative except as noted above in HPI, including Gen., Resp., CV, GI &  system review.       Objective    /82 (BP Location: Right arm)   Pulse 71   Temp 97.6  F (36.4  C) (Tympanic)   Resp  20   Wt 89.8 kg (198 lb)   SpO2 96%   BMI 28.41 kg/m    Body mass index is 28.41 kg/m .  Physical Exam   General: appears well, no distress  Neck: supple, no adenopathy  Heart: irregularly irregular , normal S1S2, no murmur  Lungs: clear to ascultation   Abd: soft, nontender, no HSM, no mass or distention   MS: antalgic gait, poor range of motion, strength intact  Feet: no lesions, good pulses, no feeling with monofilament exam up to proximal dorsal foot bilaterally    Diagnostic Test Results:  Labs reviewed in Epic  Results for orders placed or performed in visit on 07/18/19 (from the past 24 hour(s))   Hemoglobin A1c   Result Value Ref Range    Hemoglobin A1C 9.7 (H) 0 - 5.6 %           ASSESSMENT:  1. Type 2 diabetes mellitus with diabetic polyneuropathy, without long-term current use of insulin (H)    2. Screening for diabetic peripheral neuropathy    3. Hypertension, benign essential, goal below 140/90    4. Chronic atrial fibrillation (H)    5. Long term current use of anticoagulant therapy    6. Necrotizing pancreatitis    7. Acute right-sided low back pain with left-sided sciatica        PLAN:  Orders Placed This Encounter     MR Lumbar Spine w/o Contrast     Hemoglobin A1c     INR     I would recommend he get the MRI done    Patient Instructions     Get the MRI done    Keep working with Ruthann  Lab Results   Component Value Date    A1C 9.7 07/18/2019    A1C 11.6 04/16/2019    A1C 13.0 03/12/2019    A1C 7.6 11/23/2018    A1C 6.9 10/22/2018          See you in 3 months       Katie Yoder MD

## 2019-07-18 NOTE — PROGRESS NOTES
Patient still in seeing Dr. Gross at 8:55 am today ([facility] appointment was at 8:30 am). His INR was drawn with other labs at 8:28 am.     [facility] to watch for results later today and contact patient.    Aparna Kunz RN, BSN, PHN  7-

## 2019-07-18 NOTE — ED PROVIDER NOTES
History     Chief Complaint   Patient presents with     Back Pain     radiates to Rt buttock and L leg     HPI  Antoine Russo is a 73 year old male with a history of atrial fibrillation on Coumadin as well as type 3 diabetes presenting for evaluation of low back pain with most prominent symptoms in his left thigh.  He reports severe sharp pain in his left thigh and low back today.  He has had sciatica in the past but never this severe.  Patient reports he is able to find a comfortable position and sit still but any kind of movement leads to severe pain making it difficult for him to get up and go to the bathroom.  He reports that he was to the chiropractor 2 days ago for the this pain and initially was feeling better but tonight had severe worsening pain and had to crawl to the bathroom.  Denies any fever or chills.  Denies any numbness, tingling, or weakness.  Denies any bowel or bladder incontinence.  Denies any fall or injury.        Allergies:  Allergies   Allergen Reactions     Nkda [No Known Drug Allergies]        Problem List:    Patient Active Problem List    Diagnosis Date Noted     Anticipated difficulty with intubation 05/23/2017     Priority: High     Class: Chronic     Difficult two hands mask ventilation, intubated multiple times asleep with video laryngoscope. H/o tongue cancer surgery.        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 Please refer if Care Coordination services are needed.    Presenting Problem Signs and Symptoms Treatment Plan   Questions or concerns   during clinic hours   I will call my clinic directly:  [address], [place] [address]  [phone].   Questions or concerns outside clinic hours   I will call the 24 hour nurse line at   [phone] or [facility].   Need to schedule an appointment   I will call the 24 hour scheduling team at [phone] or my clinic directly at [phone].   Same day treatment     I will call my clinic first, nurse line if after hours, urgent care and express care if needed.   Clinic care coordination services (regular clinic hours)   I will call a clinic care coordinator directly:     Shelia Emanuel RN [facility]  [phone]    Brianna Cristobal :    [phone]    Or call my clinic at [phone] and ask to speak with care coordination.   Crisis Services: Behavioral or Mental Health  [facility] 24 Hour Phone Line  [phone]    [facility] 24 Hour Crisis Services  [phone]    [facility] [phone]    [facility]   [phone]     Emergency treatment -- Immediately    [facility] 911                Clostridium difficile enterocolitis 12/18/2012     Priority: Medium     Groin fluid collection 12/15/2012     Priority: Medium     CT 12/12- New (since 5/11) collection measuring at least 2.3 cm in diameter and 6.5 cm in length within the right inguinal canal. 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 Gerson Ravi and aristides at the  in 2012  NAD 2017  CT scan of the neck every 2-3 years.  - Thyroid labs yearly.  - Carotid ultrasound in three to four years to evaluate for stenosis.       Hyperlipidemia LDL goal <100 10/31/2010     Priority: Medium     CAD (coronary artery disease) 05/26/2009     Priority: Medium     Stress testing 2009 showed inferior wall ischemia.  Cath preformed; identified moderate CAD with stenosis of 40-50% in LAD and RCA.  No stents were placed.  Echo in 01/2018 (done while in Afib with rates of 100-110); EF of 55-60%.  No RWMA.       GERD (gastroesophageal reflux disease) 05/26/2009     Priority: Medium     Hypertrophy of breast 09/25/2007     Priority: Medium     PERS HX TOBACCO USE - quit in 11/06 with chantix 03/15/2007     Priority: Medium     Hypertension, benign essential, goal below 140/90 11/07/2005     Priority: Medium     Patient has only fair bp control and with family history of diabetes will all ace if lab indicated also has bph and may op for psa and not digital exam next yr        Pain in joint, shoulder region 11/07/2005     Priority: Medium     Hypertrophy of prostate without urinary obstruction 11/07/2005     Priority: Medium     Problem list name updated by automated process. 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 difficile colitis      Colon polyp      Colon polyp 8/26/2011     Coronary artery disease      Diabetes mellitus (H)      Diverticulitis      Diverticulitis  of colon 7/17/2007     Hypertension      Leukocytosis 4/17/2019     Malignant neoplasm (H)      Noninfectious ileitis      Recurrent pancreatitis        Past Surgical History:    Past Surgical History:   Procedure Laterality Date     BREAST SURGERY  2008    right breast mass benign     COLONOSCOPY      multiple polyps removed     COLONOSCOPY  8/24/2011    Procedure:COMBINED COLONOSCOPY, REMOVE TUMOR/POLYP/LESION BY SNARE; Surgeon:MILEY ARBOLEDA; Location:[facility]     COLONOSCOPY  12/17/2012    Procedure: COLONOSCOPY;;  Surgeon: Leon Maurer MD;  [facility]     COLONOSCOPY  12/18/2012    Procedure: COLONOSCOPY;;  Surgeon: Leon Maurer MD;  [facility]     DENERVATION OF SPERMATIC CORD MICROSURGICAL Left 5/23/2017    Procedure: DENERVATION OF SPERMATIC CORD MICROSURGICAL;;  Surgeon: Marcio Aggarwal MD;  [facility]     DISSECTION RADICAL NECK BILATERAL  8/2/2012    Procedure: DISSECTION RADICAL NECK BILATERAL;;  Surgeon: Yung Alvares MD;  [facility]     ENDOSCOPIC RETROGRADE CHOLANGIOPANCREATOGRAM N/A 5/10/2016    Procedure: COMBINED ENDOSCOPIC RETROGRADE CHOLANGIOPANCREATOGRAPHY, PLACE TUBE/STENT;  Surgeon: Yovanny Beasley MD;  [facility]     ENDOSCOPIC RETROGRADE CHOLANGIOPANCREATOGRAM N/A 3/29/2018    Procedure: ENDOSCOPIC RETROGRADE CHOLANGIOPANCREATOGRAM;  Endoscopic Retrograde Cholangiopancreatogram, Endoscopic Ultrasound, Biliary Sphincterotomy, Biliary and Pancreatic Stent Placement;  Surgeon: Yovanny Beasley MD;  [facility]     ENDOSCOPIC ULTRASOUND UPPER GASTROINTESTINAL TRACT (GI) N/A 2/3/2016    Procedure: ENDOSCOPIC ULTRASOUND, ESOPHAGOSCOPY / UPPER GASTROINTESTINAL TRACT (GI);  Surgeon: Grabiel Plata MD;  [facility]     ENDOSCOPIC ULTRASOUND UPPER GASTROINTESTINAL TRACT (GI) N/A 3/29/2018    Procedure: ENDOSCOPIC ULTRASOUND, ESOPHAGOSCOPY / UPPER GASTROINTESTINAL TRACT (GI);;  Surgeon: Grabiel Plata MD;  [facility]     ESOPHAGOSCOPY, GASTROSCOPY, DUODENOSCOPY (EGD), COMBINED N/A 2/3/2016    Procedure: COMBINED ENDOSCOPIC ULTRASOUND, ESOPHAGOSCOPY, GASTROSCOPY, DUODENOSCOPY (EGD), FINE NEEDLE ASPIRATE/BIOPSY;  Surgeon: Grabiel Plata MD;  [facility]     ESOPHAGOSCOPY, GASTROSCOPY, DUODENOSCOPY (EGD), COMBINED N/A 6/8/2016    Procedure: COMBINED ESOPHAGOSCOPY, GASTROSCOPY, DUODENOSCOPY (EGD), REMOVE FOREIGN BODY;  Surgeon: Yovanny Beasley MD;  [facility]     ESOPHAGOSCOPY, GASTROSCOPY, DUODENOSCOPY (EGD), COMBINED N/A 4/17/2018    Procedure: COMBINED ESOPHAGOSCOPY, GASTROSCOPY, DUODENOSCOPY (EGD), REMOVE FOREIGN BODY;  EGD with stent removal;  Surgeon: Grabiel Plata MD;  [facility]     EXCISE LESION INTRAORAL  6/14/2012    Procedure: EXCISE LESION INTRAORAL;  Wide Local Excision Floor of Mouth, Direct Laryngoscopy, Bilateral Horry's Marsuplization, Split Thickness Skin Graft from right Thigh  Latex Safe;  Surgeon: Gerson Ravi MD;  [facility]     EXCISE LESION INTRAORAL  8/2/2012    Procedure: EXCISE LESION INTRAORAL;  Floor of Mouth Resection, Bilateral Selective Radical Neck Dissection, Tracheostomy, Left Radial Forearm  Free Flap with Alloderm, Nasogastric Feeding Tube Placement,    * Latex Safe*;  Surgeon: Gerson Ravi MD;  [facility]     EXCISE LESION INTRAORAL  12/11/2012    Procedure: EXCISE LESION INTRAORAL;  takedown of oral flap;  Surgeon: Yung Alvares MD;  [facility]     GRAFT FREE VASCULARIZED (LOCATION)  8/2/2012    Procedure: GRAFT FREE VASCULARIZED (LOCATION);;  Surgeon: Yung Alvares MD;  [facility]     GRAFT SKIN SPLIT THICKNESS FROM EXTREMITY  6/14/2012    Procedure: GRAFT SKIN SPLIT THICKNESS FROM EXTREMITY;;  Surgeon: Gerson Ravi MD;  [facility]     LAPAROSCOPIC ILEOSTOMY TAKEDOWN  6/6/2013    Procedure: LAPAROSCOPIC ILEOSTOMY TAKEDOWN;  Laparoscopic Closure of Enterostomy, Guerda's Type with IleoRectal Anastomosis ;   "Surgeon: Grabiel Riddle MD;  [facility]     LAPAROTOMY EXPLORATORY  2012    Procedure: LAPAROTOMY EXPLORATORY;  Exploratory Laparotomy, total abdominal colectomy, ileostomy formation;  Surgeon: Miquel Cannon MD;  [facility]     LARYNGOSCOPY  2012    Procedure: LARYNGOSCOPY;;  Surgeon: Gerson Ravi MD;  [facility]     ORTHOPEDIC SURGERY      ganglian cyst left ankle     PANCREATECTOMY, SPLENECTOMY N/A 3/10/2016    Procedure: PANCREATECTOMY, SPLENECTOMY;  Surgeon: Nael Abel MD;  [facility]     SHOULDER SURGERY  ,     2006- right rotator cuff,  bone spur on left. Dr. Hdez     VARICOCELECTOMY Left 2017    Procedure: VARICOCELECTOMY;  Left Varicocele Repair, Denervation of Left Testis;  Surgeon: Marcio Aggarwal MD;  [facility]       Family History:    Family History   Problem Relation Age of Onset     Diabetes Sister         onset age 50     Alzheimer Disease Mother          80     Alzheimer Disease Father          85     Diabetes Other         nephew type 1     Diabetes Other      Aneurysm Sister      Anesthesia Reaction No family hx of      Colon Cancer No family hx of      Colon Polyps No family hx of      Crohn's Disease No family hx of      Ulcerative Colitis No family hx of        Social History:  Marital Status:   [2]  Social History     Tobacco Use     Smoking status: Former Smoker     Packs/day: 1.00     Years: 40.00     Pack years: 40.00     Types: Cigarettes     Last attempt to quit: 2006     Years since quittin.6     Smokeless tobacco: Never Used   Substance Use Topics     Alcohol use: Not Currently     Comment: \"1 beer on Thanksgiving day\"     Drug use: No        Medications:      acetaminophen (TYLENOL) 500 MG tablet   methocarbamol (ROBAXIN) 750 MG tablet   morphine (MSIR) 15 MG IR tablet   ASPIRIN NOT PRESCRIBED (INTENTIONAL)   blood glucose (CONTOUR NEXT TEST) test strip   gabapentin (NEURONTIN) 300 MG " "capsule   gabapentin (NEURONTIN) 600 MG tablet   insulin degludec (TRESIBA) 200 UNIT/ML pen   Insulin Lispro (HUMALOG KWIKPEN) 200 UNIT/ML soln   insulin pen needle (BD LUIS U/F) 32G X 4 MM miscellaneous   lisinopril (PRINIVIL/ZESTRIL) 5 MG tablet   metoprolol succinate ER (TOPROL-XL) 50 MG 24 hr tablet   multivitamin, therapeutic with minerals (THERA-VIT-M) TABS   tamsulosin (FLOMAX) 0.4 MG capsule   vitamin B-12 (CYANOCOBALAMIN) 100 MCG tablet   warfarin (COUMADIN) 2.5 MG tablet         Review of Systems   Constitutional: Negative for appetite change, chills, fatigue and fever.   HENT: Negative for congestion.    Respiratory: Negative for cough, chest tightness and shortness of breath.    Cardiovascular: Negative for chest pain.   Gastrointestinal: Negative for abdominal pain.   Genitourinary: Negative for enuresis.   Musculoskeletal: Positive for back pain and gait problem. Negative for neck pain and neck stiffness.   Skin: Negative for rash and wound.   Neurological: Negative for weakness, light-headedness, numbness and headaches.   All other systems reviewed and are negative.      Physical Exam   BP: 142/83  Pulse: 76  Heart Rate: 66  Temp: 98.1  F (36.7  C)  Resp: 16  Height: 177.8 cm (5' 10\")  Weight: 88.5 kg (195 lb)  SpO2: 93 %      Physical Exam   Constitutional: He is oriented to person, place, and time. He appears well-developed and well-nourished. No distress.   HENT:   Head: Normocephalic and atraumatic.   Eyes: Conjunctivae are normal.   Cardiovascular: Normal rate.   Pulmonary/Chest: Effort normal.   Abdominal: Soft. There is no tenderness.   obese   Musculoskeletal:        Right hip: He exhibits decreased range of motion (sciatica symptoms in left leg with flexion at the hip to about 15 degrees). He exhibits no tenderness and no deformity.        Left hip: He exhibits decreased range of motion (sciatica symptoms in left leg with flexion at the hip to about 30 degrees). He exhibits normal strength, " no tenderness, no swelling and no deformity.        Lumbar back: He exhibits decreased range of motion, tenderness (mild diffuse low back), pain and spasm. He exhibits no bony tenderness and no deformity.        Back:    Neurological: He is alert and oriented to person, place, and time.   Skin: Skin is warm and dry. Capillary refill takes less than 2 seconds. He is not diaphoretic.   Psychiatric: He has a normal mood and affect.   Nursing note and vitals reviewed.      ED Course        Procedures              Results for orders placed or performed during the hospital encounter of 07/18/19 (from the past 24 hour(s))   Lumbar spine XR, 2-3 views    Narrative    LUMBAR SPINE 3 VIEWS  7/18/2019 4:25 AM     HISTORY: Low back pain with sciatica.    COMPARISON: None.      Impression    IMPRESSION:  1. No visualized acute fracture, malalignment or other acute osseous  abnormality of the lumbar spine.  2. Moderate degenerative changes in the lumbar spine.  3. Atherosclerotic calcification in the abdominal aorta.    ANTONIO NAM MD       Medications   methocarbamol (ROBAXIN) tablet 1,000 mg (1,000 mg Oral Given 7/18/19 0346)   morphine (MSIR) IR tablet 15 mg (15 mg Oral Given 7/18/19 0345)   acetaminophen (TYLENOL) tablet 1,000 mg (1,000 mg Oral Given 7/18/19 0345)     4:50 AM: Patient re-assessed.  Resting comfortably when not moving.  Reports pain is slightly improved with movement.    Assessments & Plan (with Medical Decision Making)  73-year-old male presented for evaluation of low back pain with radiation to his left hip.  Symptoms consistent with sciatica.  Symptoms reproducible with flexion of the hip.  No significant bony pain.  At high risk due to his age for bony abnormalities so x-ray obtained.  X-ray showed no acute abnormality.  Symptoms most likely therefore, secondary to sciatic nerve impingement, possibly secondary to a herniated disc.  Recommended symptomatic treatment for pain and follow-up with primary  care to discuss physical therapy referral.  If symptoms are not improving, orthopedic/spinal surgery referral would be indicated.     I have reviewed the nursing notes.    I have reviewed the findings, diagnosis, plan and need for follow up with the patient.          Medication List      Started    acetaminophen 500 MG tablet  Commonly known as:  TYLENOL  1,000 mg, Oral, EVERY 8 HOURS PRN     methocarbamol 750 MG tablet  Commonly known as:  ROBAXIN  750-1,500 mg, Oral, 4 TIMES DAILY PRN     morphine 15 MG IR tablet  Commonly known as:  MSIR  15-30 mg, Oral, EVERY 4 HOURS PRN            Final diagnoses:   Acute midline low back pain with left-sided sciatica       7/18/2019   [facility] EMERGENCY DEPARTMENT     Burnette, Zain Spain MD  07/18/19 7773

## 2019-07-18 NOTE — ADDENDUM NOTE
Addended by: JERICHO PAYNE on: 7/18/2019 01:56 PM     Modules accepted: Orders, Level of Service, SmartSet

## 2019-07-22 ENCOUNTER — HOSPITAL ENCOUNTER (OUTPATIENT)
Dept: MRI IMAGING | Facility: CLINIC | Age: 74
Discharge: HOME | End: 2019-07-22
Attending: FAMILY MEDICINE | Admitting: FAMILY MEDICINE
Payer: COMMERCIAL

## 2019-07-22 DIAGNOSIS — M54.42 ACUTE RIGHT-SIDED LOW BACK PAIN WITH LEFT-SIDED SCIATICA: ICD-10-CM

## 2019-07-22 PROCEDURE — 72148 MRI LUMBAR SPINE W/O DYE: CPT

## 2019-07-23 ENCOUNTER — TELEPHONE (OUTPATIENT)
Dept: EDUCATION SERVICES | Facility: CLINIC | Age: 74
End: 2019-07-23

## 2019-07-23 DIAGNOSIS — E11.42 TYPE 2 DIABETES MELLITUS WITH DIABETIC POLYNEUROPATHY, WITHOUT LONG-TERM CURRENT USE OF INSULIN (H): Chronic | ICD-10-CM

## 2019-07-23 NOTE — TELEPHONE ENCOUNTER
Diabetes education contact:    ( )=lispro tresiba has been 54 efery evening..7-9 am 191 pm 110, 7-10 am 292 (11) pm 233 (9), 7-11 am158 (8) pm 158 (8), 7-12 am202 (9) pm 161 (8), 7-13 am 227 (9) pm 122 (0)-didn't eat, 7-14 am115 (6) pm 89 (0)-didn't eat, 7-15 am 145 (7) pm 148 (7), 7-16 am 195 (8) pm 127 (7), 7-17 am 174 (8) pm 191 (8), 7-18 am 133 (0)- didn't eat pm110(6), 7-19 am 125 (7) pm 100 (6), 7-20 am 173 (8) pm 202 (9), 7-21 am 158 (8) pm 170 (0)-didn't eat, am 171 (8) pm 102 (6)....Bookem        Bookem,    I want you to keep the Tresiba at 54 at night    Lispro: Base at am meal keep at 6 units, Base dose at evening meal increase to 8 units.  Add the sliding scale to both of these meals.    Things are looking a lot better.  Ruthann Alaniz RD, CDE    Any diabetes medication dose changes were made via the CDE Protocol and Collaborative Practice Agreement with the patient's primary care provider. A copy of this encounter was shared with the provider.

## 2019-07-24 ENCOUNTER — MYC MEDICAL ADVICE (OUTPATIENT)
Dept: FAMILY MEDICINE | Facility: CLINIC | Age: 74
End: 2019-07-24

## 2019-07-25 ENCOUNTER — ANTICOAGULATION THERAPY VISIT (OUTPATIENT)
Dept: ANTICOAGULATION | Facility: CLINIC | Age: 74
End: 2019-07-25
Payer: COMMERCIAL

## 2019-07-25 ENCOUNTER — MYC MEDICAL ADVICE (OUTPATIENT)
Dept: FAMILY MEDICINE | Facility: CLINIC | Age: 74
End: 2019-07-25

## 2019-07-25 DIAGNOSIS — I48.20 CHRONIC ATRIAL FIBRILLATION (H): ICD-10-CM

## 2019-07-25 DIAGNOSIS — Z79.01 LONG TERM CURRENT USE OF ANTICOAGULANT THERAPY: ICD-10-CM

## 2019-07-25 LAB — INR POINT OF CARE: 3.2 (ref 0.86–1.14)

## 2019-07-25 PROCEDURE — 99207 ZZC NO CHARGE NURSE ONLY: CPT

## 2019-07-25 PROCEDURE — 36416 COLLJ CAPILLARY BLOOD SPEC: CPT

## 2019-07-25 PROCEDURE — 85610 PROTHROMBIN TIME: CPT | Mod: QW

## 2019-07-25 NOTE — PROGRESS NOTES
ANTICOAGULATION FOLLOW-UP CLINIC VISIT    Patient Name:  Antoine Russo  Date:  7/25/2019  Contact Type:  Face to Face    SUBJECTIVE:  Patient Findings     Positives:   Change in activity (less mobile due to pain), Change in medications (taking some old narcotics he has left over from hospitalizations for his back pain now), Change in diet/appetite (not eating well due to pain), Other complaints (sciatica pain)    Comments:   Patient is having severe back/sciatic pain. His activity is less and he is not eating well due to the pain. His PCP put in a referral for a spinal surgeon to see him in the next few days. He is waiting for a call to schedule this. INR has been labile recently due to changes in medications and now his severe back pain. Writer will advise patient to cut today's dose slightly, increase his protein and greens but otherwise, continue recently increased weekly dosing. He will recheck INR in 2 weeks.         Clinical Outcomes     Comments:   Patient is having severe back/sciatic pain. His activity is less and he is not eating well due to the pain. His PCP put in a referral for a spinal surgeon to see him in the next few days. He is waiting for a call to schedule this. INR has been labile recently due to changes in medications and now his severe back pain. Writer will advise patient to cut today's dose slightly, increase his protein and greens but otherwise, continue recently increased weekly dosing. He will recheck INR in 2 weeks.            OBJECTIVE    INR Protime   Date Value Ref Range Status   07/25/2019 3.2 (A) 0.86 - 1.14 Final       ASSESSMENT / PLAN  INR assessment SUPRA    Recheck INR In: 2 WEEKS    INR Location Clinic      Anticoagulation Summary  As of 7/25/2019    INR goal:   2.0-3.0   TTR:   66.1 % (1.1 y)   INR used for dosing:   3.2! (7/25/2019)   Warfarin maintenance plan:   3.75 mg (2.5 mg x 1.5) every Mon, Thu; 2.5 mg (2.5 mg x 1) all other days   Full warfarin instructions:    7/25: 2.5 mg; Otherwise 3.75 mg every Mon, Thu; 2.5 mg all other days   Weekly warfarin total:   20 mg   Plan last modified:   Aparna Kunz RN (7/18/2019)   Next INR check:   8/8/2019   Priority:   INR   Target end date:   Indefinite    Indications    Chronic atrial fibrillation (H) [I48.2]  Long term current use of anticoagulant therapy [Z79.01]             Anticoagulation Episode Summary     INR check location:       Preferred lab:       Send INR reminders to:   [facility]    Comments:   * 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

## 2019-07-30 ENCOUNTER — TELEPHONE (OUTPATIENT)
Dept: EDUCATION SERVICES | Facility: CLINIC | Age: 74
End: 2019-07-30

## 2019-07-30 NOTE — TELEPHONE ENCOUNTER
Diabetes education contact:    Humalog units listed in ( )  7-23 am 155 (8) pm 97 (8), 7-24 am 222 (9) pm 94 (8), 7-25 151 (8) pm 117 (8), 7-26 am 180 (8) pm 135 (9), 7-27 am 135 (7) pm 146 (0-didn't eat), 7-28 176 (8) pm 203 (11), 7-29 am 209 (9) pm 204 (11)...I have been having back problems that is affecting my sciatic nerve. Had a visit to the ER which resulted in pain pills. Not doing much good. Also had an MRI. Today I'm going to  Orthopedics in [place] where I think they are going to shoot me with cortizone. I've heard that this raises blood sugars. Any adivce?  Also I'm still taking the 54 units ofTresiba... Bookem    Bookem,  Numbers are looking better.  Sorry about the back, that is a bummer and no fun.  Yes the cortisone will increase the blood sugars but it is always hard to tell for how long so I will just have you keep doses the same and ride it out, usually a week or less is what is affected by the cortisone.  Ruthann Alaniz Rd, CDE

## 2019-08-01 ENCOUNTER — TRANSFERRED RECORDS (OUTPATIENT)
Dept: HEALTH INFORMATION MANAGEMENT | Facility: CLINIC | Age: 74
End: 2019-08-01

## 2019-08-06 ENCOUNTER — HOSPITAL ENCOUNTER (EMERGENCY)
Facility: CLINIC | Age: 74
Discharge: HOME | End: 2019-08-06
Attending: FAMILY MEDICINE | Admitting: FAMILY MEDICINE
Payer: COMMERCIAL

## 2019-08-06 VITALS
OXYGEN SATURATION: 92 % | DIASTOLIC BLOOD PRESSURE: 92 MMHG | SYSTOLIC BLOOD PRESSURE: 163 MMHG | RESPIRATION RATE: 16 BRPM | HEART RATE: 71 BPM | TEMPERATURE: 97.8 F

## 2019-08-06 DIAGNOSIS — K85.20 ALCOHOL-INDUCED ACUTE PANCREATITIS, UNSPECIFIED COMPLICATION STATUS: ICD-10-CM

## 2019-08-06 LAB
ALBUMIN SERPL-MCNC: 3.2 G/DL (ref 3.4–5)
ALBUMIN UR-MCNC: NEGATIVE MG/DL
ALP SERPL-CCNC: 62 U/L (ref 40–150)
ALT SERPL W P-5'-P-CCNC: 38 U/L (ref 0–70)
ANION GAP SERPL CALCULATED.3IONS-SCNC: 6 MMOL/L (ref 3–14)
APPEARANCE UR: CLEAR
AST SERPL W P-5'-P-CCNC: 28 U/L (ref 0–45)
BASOPHILS # BLD AUTO: 0.1 10E9/L (ref 0–0.2)
BASOPHILS NFR BLD AUTO: 0.4 %
BILIRUB SERPL-MCNC: 0.4 MG/DL (ref 0.2–1.3)
BILIRUB UR QL STRIP: NEGATIVE
BUN SERPL-MCNC: 16 MG/DL (ref 7–30)
CALCIUM SERPL-MCNC: 9.2 MG/DL (ref 8.5–10.1)
CHLORIDE SERPL-SCNC: 102 MMOL/L (ref 94–109)
CO2 SERPL-SCNC: 26 MMOL/L (ref 20–32)
COLOR UR AUTO: ABNORMAL
CREAT SERPL-MCNC: 0.94 MG/DL (ref 0.66–1.25)
CRP SERPL-MCNC: 10.6 MG/L (ref 0–8)
DIFFERENTIAL METHOD BLD: ABNORMAL
EOSINOPHIL # BLD AUTO: 0.2 10E9/L (ref 0–0.7)
EOSINOPHIL NFR BLD AUTO: 1.8 %
ERYTHROCYTE [DISTWIDTH] IN BLOOD BY AUTOMATED COUNT: 15 % (ref 10–15)
GFR SERPL CREATININE-BSD FRML MDRD: 80 ML/MIN/{1.73_M2}
GLUCOSE SERPL-MCNC: 248 MG/DL (ref 70–99)
GLUCOSE UR STRIP-MCNC: 50 MG/DL
HCT VFR BLD AUTO: 44.4 % (ref 40–53)
HGB BLD-MCNC: 14 G/DL (ref 13.3–17.7)
HGB UR QL STRIP: NEGATIVE
IMM GRANULOCYTES # BLD: 0.1 10E9/L (ref 0–0.4)
IMM GRANULOCYTES NFR BLD: 0.5 %
KETONES UR STRIP-MCNC: NEGATIVE MG/DL
LACTATE BLD-SCNC: 1.4 MMOL/L (ref 0.7–2)
LEUKOCYTE ESTERASE UR QL STRIP: NEGATIVE
LIPASE SERPL-CCNC: 467 U/L (ref 73–393)
LYMPHOCYTES # BLD AUTO: 3.5 10E9/L (ref 0.8–5.3)
LYMPHOCYTES NFR BLD AUTO: 29.8 %
MCH RBC QN AUTO: 29.9 PG (ref 26.5–33)
MCHC RBC AUTO-ENTMCNC: 31.5 G/DL (ref 31.5–36.5)
MCV RBC AUTO: 95 FL (ref 78–100)
MONOCYTES # BLD AUTO: 0.8 10E9/L (ref 0–1.3)
MONOCYTES NFR BLD AUTO: 6.9 %
NEUTROPHILS # BLD AUTO: 7.2 10E9/L (ref 1.6–8.3)
NEUTROPHILS NFR BLD AUTO: 60.6 %
NITRATE UR QL: NEGATIVE
NRBC # BLD AUTO: 0 10*3/UL
NRBC BLD AUTO-RTO: 0 /100
PH UR STRIP: 5 PH (ref 5–7)
PLATELET # BLD AUTO: 267 10E9/L (ref 150–450)
POTASSIUM SERPL-SCNC: 4.2 MMOL/L (ref 3.4–5.3)
PROT SERPL-MCNC: 7.6 G/DL (ref 6.8–8.8)
RBC # BLD AUTO: 4.69 10E12/L (ref 4.4–5.9)
RBC #/AREA URNS AUTO: 1 /HPF (ref 0–2)
SODIUM SERPL-SCNC: 134 MMOL/L (ref 133–144)
SOURCE: ABNORMAL
SP GR UR STRIP: 1.01 (ref 1–1.03)
UROBILINOGEN UR STRIP-MCNC: 0 MG/DL (ref 0–2)
WBC # BLD AUTO: 11.9 10E9/L (ref 4–11)
WBC #/AREA URNS AUTO: 1 /HPF (ref 0–5)

## 2019-08-06 PROCEDURE — 81001 URINALYSIS AUTO W/SCOPE: CPT | Performed by: FAMILY MEDICINE

## 2019-08-06 PROCEDURE — 86140 C-REACTIVE PROTEIN: CPT | Performed by: FAMILY MEDICINE

## 2019-08-06 PROCEDURE — 96375 TX/PRO/DX INJ NEW DRUG ADDON: CPT | Performed by: FAMILY MEDICINE

## 2019-08-06 PROCEDURE — 87086 URINE CULTURE/COLONY COUNT: CPT | Performed by: FAMILY MEDICINE

## 2019-08-06 PROCEDURE — 83605 ASSAY OF LACTIC ACID: CPT | Performed by: FAMILY MEDICINE

## 2019-08-06 PROCEDURE — 96361 HYDRATE IV INFUSION ADD-ON: CPT | Performed by: FAMILY MEDICINE

## 2019-08-06 PROCEDURE — 80053 COMPREHEN METABOLIC PANEL: CPT | Performed by: FAMILY MEDICINE

## 2019-08-06 PROCEDURE — 83690 ASSAY OF LIPASE: CPT | Performed by: FAMILY MEDICINE

## 2019-08-06 PROCEDURE — 85025 COMPLETE CBC W/AUTO DIFF WBC: CPT | Performed by: FAMILY MEDICINE

## 2019-08-06 PROCEDURE — 96374 THER/PROPH/DIAG INJ IV PUSH: CPT | Performed by: FAMILY MEDICINE

## 2019-08-06 PROCEDURE — 99285 EMERGENCY DEPT VISIT HI MDM: CPT | Mod: 25 | Performed by: FAMILY MEDICINE

## 2019-08-06 PROCEDURE — 99285 EMERGENCY DEPT VISIT HI MDM: CPT | Mod: Z6 | Performed by: FAMILY MEDICINE

## 2019-08-06 PROCEDURE — 25000128 H RX IP 250 OP 636: Performed by: FAMILY MEDICINE

## 2019-08-06 PROCEDURE — 25800030 ZZH RX IP 258 OP 636: Performed by: FAMILY MEDICINE

## 2019-08-06 RX ORDER — OXYCODONE AND ACETAMINOPHEN 5; 325 MG/1; MG/1
1-2 TABLET ORAL EVERY 4 HOURS PRN
Qty: 12 TABLET | Refills: 0 | Status: SHIPPED | OUTPATIENT
Start: 2019-08-06 | End: 2019-10-17

## 2019-08-06 RX ORDER — HYDROMORPHONE HYDROCHLORIDE 1 MG/ML
0.5 INJECTION, SOLUTION INTRAMUSCULAR; INTRAVENOUS; SUBCUTANEOUS ONCE
Status: COMPLETED | OUTPATIENT
Start: 2019-08-06 | End: 2019-08-06

## 2019-08-06 RX ORDER — ONDANSETRON 4 MG/1
4 TABLET, ORALLY DISINTEGRATING ORAL EVERY 8 HOURS PRN
Qty: 20 TABLET | Refills: 0 | Status: SHIPPED | OUTPATIENT
Start: 2019-08-06 | End: 2019-10-17

## 2019-08-06 RX ORDER — SODIUM CHLORIDE, SODIUM LACTATE, POTASSIUM CHLORIDE, CALCIUM CHLORIDE 600; 310; 30; 20 MG/100ML; MG/100ML; MG/100ML; MG/100ML
1000 INJECTION, SOLUTION INTRAVENOUS CONTINUOUS
Status: DISCONTINUED | OUTPATIENT
Start: 2019-08-06 | End: 2019-08-06 | Stop reason: HOSPADM

## 2019-08-06 RX ORDER — ONDANSETRON 2 MG/ML
4 INJECTION INTRAMUSCULAR; INTRAVENOUS ONCE
Status: COMPLETED | OUTPATIENT
Start: 2019-08-06 | End: 2019-08-06

## 2019-08-06 RX ADMIN — HYDROMORPHONE HYDROCHLORIDE 0.5 MG: 1 INJECTION, SOLUTION INTRAMUSCULAR; INTRAVENOUS; SUBCUTANEOUS at 04:08

## 2019-08-06 RX ADMIN — ONDANSETRON 4 MG: 2 INJECTION INTRAMUSCULAR; INTRAVENOUS at 03:59

## 2019-08-06 RX ADMIN — SODIUM CHLORIDE, POTASSIUM CHLORIDE, SODIUM LACTATE AND CALCIUM CHLORIDE 1000 ML: 600; 310; 30; 20 INJECTION, SOLUTION INTRAVENOUS at 02:52

## 2019-08-06 RX ADMIN — SODIUM CHLORIDE, POTASSIUM CHLORIDE, SODIUM LACTATE AND CALCIUM CHLORIDE 1000 ML: 600; 310; 30; 20 INJECTION, SOLUTION INTRAVENOUS at 04:08

## 2019-08-06 NOTE — DISCHARGE INSTRUCTIONS
Hendricks diet, clear fluids until resolution of pain.  No alcohol.  Percocet if needed for pain.  Zofran if needed for nausea/vomiting.

## 2019-08-06 NOTE — ED PROVIDER NOTES
History     Chief Complaint   Patient presents with     Abdominal Pain     Left upper abd pain after having a few beer today- started around 6pm   abd pain  HPI  Antoine Russo is a 73 year old male, past medical history is significant for chronic pancreatitis, pancreatic pseudocyst, chronic atrial fibrillation, type 2 diabetes, C. difficile enterocolitis, oral cancer, hyperlipidemia, ASCVD, GERD, hypertension, tobacco abuse, hypertension, BPH, presents to the emergency department with concerns of abdominal pain.  History is obtained from the patient who presents with his wife.  He states that he is concerned that he has an exacerbation of pancreatitis beginning around 6:00 this evening the day prior to presentation.  The patient consumed 1 or 2 beer at around 1 or 2:00 PM yesterday afternoon the day prior to presentation.  He notes that he is not supposed to be drinking and that this often triggers his pancreatitis.  His last exacerbation was in April understand similar circumstances.  He reports no nausea or vomiting yet.  He is taken nothing for pain.  He reports the pain is dull sharp and cramping in the epigastrium and left upper quadrant.  He denied fever chills or sweats.  No urinary tract symptoms.  No change in bowel habit recently.      Allergies:  Allergies   Allergen Reactions     Nkda [No Known Drug Allergies]        Problem List:    Patient Active Problem List    Diagnosis Date Noted     Anticipated difficulty with intubation 05/23/2017     Priority: High     Class: Chronic     Difficult two hands mask ventilation, intubated multiple times asleep with video laryngoscope. H/o tongue cancer surgery.        Pancreatic pseudocyst 04/18/2019     Priority: Medium     Acute pancreatitis 10/21/2018     Priority: Medium     Long term current use of anticoagulant therapy 04/05/2018     Priority: Medium     Recurrent pancreatitis 03/30/2018     Priority: Medium     Chronic atrial fibrillation (H) 01/23/2018      Priority: Medium     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 Please refer if Care Coordination services are needed.    Presenting Problem Signs and Symptoms Treatment Plan   Questions or concerns   during clinic hours   I will call my clinic directly:  [address], [place] [address]  [phone].   Questions or concerns outside clinic hours   I will call the 24 hour nurse line at   [phone] or [facility].   Need to schedule an appointment   I will call the 24 hour scheduling team at [phone] or my clinic directly at [phone].   Same day treatment     I will call my clinic first, nurse line if after hours, urgent care and express care if needed.   Clinic care coordination services (regular clinic hours)   I will call a clinic care coordinator directly:     Shelia Emanuel RN [facility]  [phone]    Brianna Cristobal SW:    [phone]    Or call my clinic at [phone] and ask to speak with care coordination.   Crisis Services: Behavioral or Mental Health  [facility] 24 Hour Phone Line  [phone]    [facility] 24 Hour Crisis Services  [phone]    [facility] (Behavioral Health Providers) Network [phone]    [facility]   [phone]     Emergency treatment -- Immediately    [facility] 911                Clostridium difficile enterocolitis 12/18/2012     Priority: Medium     Groin fluid collection 12/15/2012      Priority: Medium     CT 12/12- New (since 5/11) collection measuring at least 2.3 cm in diameter and 6.5 cm in length within the right inguinal canal. 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 Gerson Ravi and collekapil at the  in 2012  NAD 2017  CT scan of the neck every 2-3 years.  - Thyroid labs yearly.  - Carotid ultrasound in three to four years to evaluate for stenosis.       Hyperlipidemia LDL goal <100 10/31/2010     Priority: Medium     CAD (coronary artery disease) 05/26/2009     Priority: Medium     Stress testing 2009 showed inferior wall ischemia.  Cath preformed; identified moderate CAD with stenosis of 40-50% in LAD and RCA.  No stents were placed.  Echo in 01/2018 (done while in Afib with rates of 100-110); EF of 55-60%.  No RWMA.       GERD (gastroesophageal reflux disease) 05/26/2009     Priority: Medium     Hypertrophy of breast 09/25/2007     Priority: Medium     PERS HX TOBACCO USE - quit in 11/06 with chantix 03/15/2007     Priority: Medium     Hypertension, benign essential, goal below 140/90 11/07/2005     Priority: Medium     Patient has only fair bp control and with family history of diabetes will all ace if lab indicated also has bph and may op for psa and not digital exam next yr        Pain in joint, shoulder region 11/07/2005     Priority: Medium     Hypertrophy of prostate without urinary obstruction 11/07/2005     Priority: Medium     Problem list name updated by automated process. Provider to review       Impotence of organic origin 11/07/2005     Priority: Medium        Past Medical History:    Past Medical  History:   Diagnosis Date     Acute kidney injury (H) 4/17/2019     Acute on chronic pancreatitis (H) 1/23/2018     Bacteriuria with pyuria 4/17/2019     C. difficile colitis      Colon polyp      Colon polyp 8/26/2011     Coronary artery disease      Diabetes mellitus (H)      Diverticulitis      Diverticulitis of colon 7/17/2007     Hypertension      Leukocytosis 4/17/2019     Malignant neoplasm (H)      Noninfectious ileitis      Recurrent pancreatitis        Past Surgical History:    Past Surgical History:   Procedure Laterality Date     BREAST SURGERY  2008    right breast mass benign     COLONOSCOPY      multiple polyps removed     COLONOSCOPY  8/24/2011    Procedure:COMBINED COLONOSCOPY, REMOVE TUMOR/POLYP/LESION BY SNARE; Surgeon:MILEY ARBOLEDA; Location:[facility]     COLONOSCOPY  12/17/2012    Procedure: COLONOSCOPY;;  Surgeon: Leon Maurer MD;  [facility]     COLONOSCOPY  12/18/2012    Procedure: COLONOSCOPY;;  Surgeon: Leon Maurer MD;  [facility]     DENERVATION OF SPERMATIC CORD MICROSURGICAL Left 5/23/2017    Procedure: DENERVATION OF SPERMATIC CORD MICROSURGICAL;;  Surgeon: Marcio Aggarwal MD;  [facility]     DISSECTION RADICAL NECK BILATERAL  8/2/2012    Procedure: DISSECTION RADICAL NECK BILATERAL;;  Surgeon: Yung Alvares MD;  [facility]     ENDOSCOPIC RETROGRADE CHOLANGIOPANCREATOGRAM N/A 5/10/2016    Procedure: COMBINED ENDOSCOPIC RETROGRADE CHOLANGIOPANCREATOGRAPHY, PLACE TUBE/STENT;  Surgeon: Yovanny Beasley MD;  [facility]     ENDOSCOPIC RETROGRADE CHOLANGIOPANCREATOGRAM N/A 3/29/2018    Procedure: ENDOSCOPIC RETROGRADE CHOLANGIOPANCREATOGRAM;  Endoscopic Retrograde Cholangiopancreatogram, Endoscopic Ultrasound, Biliary Sphincterotomy, Biliary and Pancreatic Stent Placement;  Surgeon: Yovanny Beasley MD;  [facility]     ENDOSCOPIC ULTRASOUND UPPER GASTROINTESTINAL TRACT (GI) N/A 2/3/2016    Procedure: ENDOSCOPIC ULTRASOUND,  ESOPHAGOSCOPY / UPPER GASTROINTESTINAL TRACT (GI);  Surgeon: Grabiel Plata MD;  [facility]     ENDOSCOPIC ULTRASOUND UPPER GASTROINTESTINAL TRACT (GI) N/A 3/29/2018    Procedure: ENDOSCOPIC ULTRASOUND, ESOPHAGOSCOPY / UPPER GASTROINTESTINAL TRACT (GI);;  Surgeon: Grabiel Plata MD;  [facility]     ESOPHAGOSCOPY, GASTROSCOPY, DUODENOSCOPY (EGD), COMBINED N/A 2/3/2016    Procedure: COMBINED ENDOSCOPIC ULTRASOUND, ESOPHAGOSCOPY, GASTROSCOPY, DUODENOSCOPY (EGD), FINE NEEDLE ASPIRATE/BIOPSY;  Surgeon: Grabiel Plata MD;  [facility]     ESOPHAGOSCOPY, GASTROSCOPY, DUODENOSCOPY (EGD), COMBINED N/A 6/8/2016    Procedure: COMBINED ESOPHAGOSCOPY, GASTROSCOPY, DUODENOSCOPY (EGD), REMOVE FOREIGN BODY;  Surgeon: Yovanny Beasley MD;  [facility]     ESOPHAGOSCOPY, GASTROSCOPY, DUODENOSCOPY (EGD), COMBINED N/A 4/17/2018    Procedure: COMBINED ESOPHAGOSCOPY, GASTROSCOPY, DUODENOSCOPY (EGD), REMOVE FOREIGN BODY;  EGD with stent removal;  Surgeon: Grabiel Plata MD;  [facility]     EXCISE LESION INTRAORAL  6/14/2012    Procedure: EXCISE LESION INTRAORAL;  Wide Local Excision Floor of Mouth, Direct Laryngoscopy, Bilateral Ida's Marsuplization, Split Thickness Skin Graft from right Thigh  Latex Safe;  Surgeon: Gerson Ravi MD;  [facility]     EXCISE LESION INTRAORAL  8/2/2012    Procedure: EXCISE LESION INTRAORAL;  Floor of Mouth Resection, Bilateral Selective Radical Neck Dissection, Tracheostomy, Left Radial Forearm  Free Flap with Alloderm, Nasogastric Feeding Tube Placement,    * Latex Safe*;  Surgeon: Gerson Ravi MD;  [facility]     EXCISE LESION INTRAORAL  12/11/2012    Procedure: EXCISE LESION INTRAORAL;  takedown of oral flap;  Surgeon: Yung Alvares MD;  [facility]     GRAFT FREE VASCULARIZED (LOCATION)  8/2/2012    Procedure: GRAFT FREE VASCULARIZED (LOCATION);;  Surgeon: Yung Alvares MD;  [facility]     GRAFT SKIN SPLIT  THICKNESS FROM EXTREMITY  2012    Procedure: GRAFT SKIN SPLIT THICKNESS FROM EXTREMITY;;  Surgeon: Gerson Ravi MD;  [facility]     LAPAROSCOPIC ILEOSTOMY TAKEDOWN  2013    Procedure: LAPAROSCOPIC ILEOSTOMY TAKEDOWN;  Laparoscopic Closure of Enterostomy, Guerda's Type with IleoRectal Anastomosis ;  Surgeon: Grabiel Riddle MD;  [facility]     LAPAROTOMY EXPLORATORY  2012    Procedure: LAPAROTOMY EXPLORATORY;  Exploratory Laparotomy, total abdominal colectomy, ileostomy formation;  Surgeon: Miquel Cannon MD;  [facility]     LARYNGOSCOPY  2012    Procedure: LARYNGOSCOPY;;  Surgeon: Gerson Ravi MD;  [facility]     ORTHOPEDIC SURGERY      ganglian cyst left ankle     PANCREATECTOMY, SPLENECTOMY N/A 3/10/2016    Procedure: PANCREATECTOMY, SPLENECTOMY;  Surgeon: Nael Abel MD;  [facility]     SHOULDER SURGERY  ,     2006- right rotator cuff,  bone spur on left. Dr. Hdez     VARICOCELECTOMY Left 2017    Procedure: VARICOCELECTOMY;  Left Varicocele Repair, Denervation of Left Testis;  Surgeon: Marcio Aggarwal MD;  [facility]       Family History:    Family History   Problem Relation Age of Onset     Diabetes Sister         onset age 50     Alzheimer Disease Mother          80     Alzheimer Disease Father          85     Diabetes Other         nephew type 1     Diabetes Other      Aneurysm Sister      Anesthesia Reaction No family hx of      Colon Cancer No family hx of      Colon Polyps No family hx of      Crohn's Disease No family hx of      Ulcerative Colitis No family hx of        Social History:  Marital Status:   [2]  Social History     Tobacco Use     Smoking status: Former Smoker     Packs/day: 1.00     Years: 40.00     Pack years: 40.00     Types: Cigarettes     Last attempt to quit: 2006     Years since quittin.7     Smokeless tobacco: Never Used   Substance Use Topics     Alcohol use: Not Currently  "    Comment: \"1 beer on Thanksgiving day\"     Drug use: No        Medications:      ondansetron (ZOFRAN ODT) 4 MG ODT tab   oxyCODONE-acetaminophen (PERCOCET) 5-325 MG tablet   ASPIRIN NOT PRESCRIBED (INTENTIONAL)   blood glucose (CONTOUR NEXT TEST) test strip   gabapentin (NEURONTIN) 300 MG capsule   gabapentin (NEURONTIN) 600 MG tablet   insulin degludec (TRESIBA) 200 UNIT/ML pen   Insulin Lispro (HUMALOG KWIKPEN) 200 UNIT/ML soln   insulin pen needle (BD LUIS U/F) 32G X 4 MM miscellaneous   lisinopril (PRINIVIL/ZESTRIL) 5 MG tablet   methocarbamol (ROBAXIN) 750 MG tablet   metoprolol succinate ER (TOPROL-XL) 50 MG 24 hr tablet   morphine (MSIR) 15 MG IR tablet   multivitamin, therapeutic with minerals (THERA-VIT-M) TABS   tamsulosin (FLOMAX) 0.4 MG capsule   vitamin B-12 (CYANOCOBALAMIN) 100 MCG tablet   warfarin (COUMADIN) 2.5 MG tablet         Review of Systems   All other systems reviewed and are negative.      Physical Exam   BP: (!) 151/81  Pulse: 77  Heart Rate: 82  Temp: 97.8  F (36.6  C)  Resp: 16  SpO2: 93 %      Physical Exam   Constitutional: He appears well-developed and well-nourished.   HENT:   Head: Normocephalic and atraumatic.   Mouth/Throat: Oropharynx is clear and moist.   Eyes: Pupils are equal, round, and reactive to light. EOM are normal.   Cardiovascular: Normal rate and regular rhythm.   Pulmonary/Chest: Effort normal and breath sounds normal.   Abdominal: Normal appearance and bowel sounds are normal. There is tenderness in the epigastric area and left upper quadrant. There is guarding.   Skin: Skin is warm and dry. Capillary refill takes less than 2 seconds.   Psychiatric: He has a normal mood and affect.   Nursing note and vitals reviewed.      ED Course        Procedures               Critical Care time:  none               Results for orders placed or performed during the hospital encounter of 08/06/19 (from the past 24 hour(s))   CBC with platelets differential   Result Value Ref " Range    WBC 11.9 (H) 4.0 - 11.0 10e9/L    RBC Count 4.69 4.4 - 5.9 10e12/L    Hemoglobin 14.0 13.3 - 17.7 g/dL    Hematocrit 44.4 40.0 - 53.0 %    MCV 95 78 - 100 fl    MCH 29.9 26.5 - 33.0 pg    MCHC 31.5 31.5 - 36.5 g/dL    RDW 15.0 10.0 - 15.0 %    Platelet Count 267 150 - 450 10e9/L    Diff Method Automated Method     % Neutrophils 60.6 %    % Lymphocytes 29.8 %    % Monocytes 6.9 %    % Eosinophils 1.8 %    % Basophils 0.4 %    % Immature Granulocytes 0.5 %    Nucleated RBCs 0 0 /100    Absolute Neutrophil 7.2 1.6 - 8.3 10e9/L    Absolute Lymphocytes 3.5 0.8 - 5.3 10e9/L    Absolute Monocytes 0.8 0.0 - 1.3 10e9/L    Absolute Eosinophils 0.2 0.0 - 0.7 10e9/L    Absolute Basophils 0.1 0.0 - 0.2 10e9/L    Abs Immature Granulocytes 0.1 0 - 0.4 10e9/L    Absolute Nucleated RBC 0.0    CRP inflammation   Result Value Ref Range    CRP Inflammation 10.6 (H) 0.0 - 8.0 mg/L   Comprehensive metabolic panel   Result Value Ref Range    Sodium 134 133 - 144 mmol/L    Potassium 4.2 3.4 - 5.3 mmol/L    Chloride 102 94 - 109 mmol/L    Carbon Dioxide 26 20 - 32 mmol/L    Anion Gap 6 3 - 14 mmol/L    Glucose 248 (H) 70 - 99 mg/dL    Urea Nitrogen 16 7 - 30 mg/dL    Creatinine 0.94 0.66 - 1.25 mg/dL    GFR Estimate 80 >60 mL/min/[1.73_m2]    GFR Estimate If Black >90 >60 mL/min/[1.73_m2]    Calcium 9.2 8.5 - 10.1 mg/dL    Bilirubin Total 0.4 0.2 - 1.3 mg/dL    Albumin 3.2 (L) 3.4 - 5.0 g/dL    Protein Total 7.6 6.8 - 8.8 g/dL    Alkaline Phosphatase 62 40 - 150 U/L    ALT 38 0 - 70 U/L    AST 28 0 - 45 U/L   Lipase   Result Value Ref Range    Lipase 467 (H) 73 - 393 U/L   Lactic acid whole blood   Result Value Ref Range    Lactic Acid 1.4 0.7 - 2.0 mmol/L       Medications   lactated ringers BOLUS 1,000 mL (0 mLs Intravenous Stopped 8/6/19 0359)     Followed by   lactated ringers infusion (1,000 mLs Intravenous New Bag 8/6/19 0408)   ondansetron (ZOFRAN) injection 4 mg (4 mg Intravenous Given 8/6/19 0359)   HYDROmorphone (PF)  (DILAUDID) injection 0.5 mg (0.5 mg Intravenous Given 8/6/19 0408)     4:25 AM  Recheck finds patient comfortable.  We reviewed his lab diagnostics.  Minimal elevation of his lipase.  Assessments & Plan (with Medical Decision Making)   73-year-old male past medical history reviewed as above who presents the emergency department with concerns of possible flare of pancreatitis.  Patient knowingly ingested alcohol which has precipitated his pancreatitis in the past.  Nonsurgical abdomen on exam.  Lipase is minimally elevated.  There is no systemic leukocytosis.  Patient's pain was managed with 0.5 mg of Dilaudid and IV Zofran to good effect.  After some discussion with he and his wife I think it is entirely reasonable to manage him at home with oral pain medications and antiemetics provided that he avoid alcohol and restrict himself to a bland diet and clear fluids until resolution of symptoms.  If is not going well for him at home he will return.    Disclaimer: This note consists of symbols derived from keyboarding, dictation and/or voice recognition software. As a result, there may be errors in the script that have gone undetected. Please consider this when interpreting information found in this chart.      I have reviewed the nursing notes.    I have reviewed the findings, diagnosis, plan and need for follow up with the patient.             Medication List      Started    ondansetron 4 MG ODT tab  Commonly known as:  ZOFRAN ODT  4 mg, Oral, EVERY 8 HOURS PRN     oxyCODONE-acetaminophen 5-325 MG tablet  Commonly known as:  PERCOCET  1-2 tablets, Oral, EVERY 4 HOURS PRN            Final diagnoses:   Alcohol-induced acute pancreatitis, unspecified complication status - Mild exacerbation.       8/6/2019   [facility] EMERGENCY DEPARTMENT     Willam Toure MD  08/06/19 0427

## 2019-08-06 NOTE — ED AVS SNAPSHOT
[facility] Emergency Department  [address] [address]  Phone:  [phone]  Fax:  [phone]                                    Antoine Russo   MRN: [id number]    Department:  [facility] Emergency Department   Date of Visit:  8/6/2019           After Visit Summary Signature Page    I have received my discharge instructions, and my questions have been answered. I have discussed any challenges I see with this plan with the nurse or doctor.    ..........................................................................................................................................  Patient/Patient Representative Signature      ..........................................................................................................................................  Patient Representative Print Name and Relationship to Patient    ..................................................               ................................................  Date                                   Time    ..........................................................................................................................................  Reviewed by Signature/Title    ...................................................              ..............................................  Date                                               Time          22EPIC Rev 08/18

## 2019-08-07 LAB
BACTERIA SPEC CULT: NO GROWTH
Lab: NORMAL
SPECIMEN SOURCE: NORMAL

## 2019-08-07 NOTE — RESULT ENCOUNTER NOTE
Final urine culture report is NEGATIVE per [facility] Lab Result protocol.    If NEGATIVE result, no change in treatment, per [facility] Lab Result protocol.

## 2019-08-08 ENCOUNTER — ANTICOAGULATION THERAPY VISIT (OUTPATIENT)
Dept: ANTICOAGULATION | Facility: CLINIC | Age: 74
End: 2019-08-08
Payer: COMMERCIAL

## 2019-08-08 DIAGNOSIS — Z79.01 LONG TERM CURRENT USE OF ANTICOAGULANT THERAPY: ICD-10-CM

## 2019-08-08 DIAGNOSIS — I48.20 CHRONIC ATRIAL FIBRILLATION (H): ICD-10-CM

## 2019-08-08 LAB — INR POINT OF CARE: 2.8 (ref 0.86–1.14)

## 2019-08-08 PROCEDURE — 36416 COLLJ CAPILLARY BLOOD SPEC: CPT

## 2019-08-08 PROCEDURE — 85610 PROTHROMBIN TIME: CPT | Mod: QW

## 2019-08-08 PROCEDURE — 99207 ZZC NO CHARGE NURSE ONLY: CPT

## 2019-08-08 NOTE — PROGRESS NOTES
ANTICOAGULATION FOLLOW-UP CLINIC VISIT    Patient Name:  Antoine Russo  Date:  8/8/2019  Contact Type:  Face to Face    SUBJECTIVE:  Patient Findings     Positives:   Change in alcohol use (had 5 drinks in 3 days, starting on Friday), Emergency department visit (8-6-19 for alcohol induced pancreatitis), Missed doses (partial missed dose on Monday this week), Change in medications (zofran and percocet at ED. Has not used the percocet), Other complaints (still having intense back pain from compressed vertebrae)    Comments:   Patient was in the ED on 8-6 for pancreatitis related to alcohol use. He was frustrated over the weekend when his insurance informed him he can only go to [place] for back surgery. Pt wanted to go to [facility]. He had 1 beer on Friday and Sat and 3 beers on Sunday. This caused a flare up of his pancreatitis symptoms. He is feeling better today, except for his back pain. He did miss a partial dose of warfarin on Monday but patient's INR may have been in range on new maintenance dose if he had not had the alcohol. Will continue with new maintenance dose and recheck INR in 2 weeks. No other changes or concerns.         Clinical Outcomes     Comments:   Patient was in the ED on 8-6 for pancreatitis related to alcohol use. He was frustrated over the weekend when his insurance informed him he can only go to [place] for back surgery. Pt wanted to go to [facility]. He had 1 beer on Friday and Sat and 3 beers on Sunday. This caused a flare up of his pancreatitis symptoms. He is feeling better today, except for his back pain. He did miss a partial dose of warfarin on Monday but patient's INR may have been in range on new maintenance dose if he had not had the alcohol. Will continue with new maintenance dose and recheck INR in 2 weeks. No other changes or concerns.            OBJECTIVE    INR Protime   Date Value Ref Range Status   08/08/2019 2.8 (A) 0.86 - 1.14 Final       ASSESSMENT / PLAN  INR assessment THER     Recheck INR In: 2 WEEKS    INR Location Clinic      Anticoagulation Summary  As of 2019    INR goal:   2.0-3.0   TTR:   65.5 % (1.1 y)   INR used for dosin.8 (2019)   Warfarin maintenance plan:   3.75 mg (2.5 mg x 1.5) every Mon, Thu; 2.5 mg (2.5 mg x 1) all other days   Full warfarin instructions:   3.75 mg every Mon, Thu; 2.5 mg all other days   Weekly warfarin total:   20 mg   No change documented:   Aparna Kunz RN   Plan last modified:   Aparna Kunz RN (2019)   Next INR check:   2019   Priority:   INR   Target end date:   Indefinite    Indications    Chronic atrial fibrillation (H) [I48.2]  Long term current use of anticoagulant therapy [Z79.01]             Anticoagulation Episode Summary     INR check location:       Preferred lab:       Send INR reminders to:   [facility]    Comments:   * 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

## 2019-08-09 ENCOUNTER — TELEPHONE (OUTPATIENT)
Dept: FAMILY MEDICINE | Facility: CLINIC | Age: 74
End: 2019-08-09

## 2019-08-09 NOTE — TELEPHONE ENCOUNTER
Form received from [facility] re: information below sent to PCP.    [facility]rellana  [facility]

## 2019-08-09 NOTE — TELEPHONE ENCOUNTER
Dr. Gross,  Received a call from Jeimy from the [facility] ER this morning. Jeimy would like to fax information to you to have you review to see if you would be willing to be this patient's health care adviser during their review/plan of care for spinal issues, says they would need you to approve physical therapy,etc. The fax will have the information that would describe your role. This nurse approved that they send a fax for your review only, this message is being routed to update you when you receive the fax, fax number given to Jeimy, thank you.    FRANCHESCA Torres

## 2019-08-13 NOTE — TELEPHONE ENCOUNTER
Form received back signed  8/13/19  Faxed Back & Sent to be scanned to this encounter  Janie Orn Station Sec

## 2019-08-14 ENCOUNTER — TELEPHONE (OUTPATIENT)
Dept: EDUCATION SERVICES | Facility: CLINIC | Age: 74
End: 2019-08-14

## 2019-08-14 DIAGNOSIS — E11.42 TYPE 2 DIABETES MELLITUS WITH DIABETIC POLYNEUROPATHY, WITHOUT LONG-TERM CURRENT USE OF INSULIN (H): Chronic | ICD-10-CM

## 2019-08-14 NOTE — TELEPHONE ENCOUNTER
Diabetes education contact:      Here we go again:  ( )=humalog.  7-30 am 234 (9) pm 175 (10), 7-31 am 250 (10) pm 220 (11), 8-1 am 194 (8) pm 191 (10), 8-2 am 273 (11) pm 176 (10), 8-3 am  230 (9) pm 169 (10), 8-4 am 267 (11) pm  263 (13), 8-5 288 (11) pm 143 (9), 8-6 am  233 (0-didn't eat) pm 156 (10). Have been having trouble with my back/sciatic nerve. Looks like it's going to require back surgery ( at [facility] in [place]). Also had a slight flare-up of pancreaitis on the 5th and went to the ER. Faith years...yeah right....Take care. Bookem    Ok, here we go: 8-7 am 230 pm 155, 8-8 277/235, 8-9 206/233, 8-10 179/175, 8-11 234/257, 8-12 211/206, 8-13 224/179. I'm using the lispro according to the chart (didn't think it was necessary to send the amount). Still waiting to hear from the [place] on my back. Due to the back/sciatic nerve pain, I'm not getting a whole lot of exercise.. Bookem    Corrinaem,  Go up to 8 unit base at breakfast and lunch and 10 unit base with supper.  Send numbers again in one week.  Sorry about your back, no fun!  Ruthann Alaniz RD, CDE

## 2019-08-20 ENCOUNTER — MYC MEDICAL ADVICE (OUTPATIENT)
Dept: FAMILY MEDICINE | Facility: CLINIC | Age: 74
End: 2019-08-20

## 2019-08-21 ENCOUNTER — TELEPHONE (OUTPATIENT)
Dept: EDUCATION SERVICES | Facility: CLINIC | Age: 74
End: 2019-08-21

## 2019-08-21 DIAGNOSIS — E11.42 TYPE 2 DIABETES MELLITUS WITH DIABETIC POLYNEUROPATHY, WITHOUT LONG-TERM CURRENT USE OF INSULIN (H): Chronic | ICD-10-CM

## 2019-08-21 NOTE — TELEPHONE ENCOUNTER
Diabetes education contact:    8-14 319/233, 8-15 285/299, 8-16 260/258, 8-17 306/201, 8-18 245/151, 8-19 272/244....I have been taking the lispro accordig to the chart plus 8 in am and 10 in pm. Still doing the tresiba at 54 units....Bookem    Why don t you try the high sliding scale first and watch for three days if the numbers are still consistently over 180 then change the lispro base to 10 in am and 12 in the pm.  Your back issue is really affecting your blood sugars.    Ruthann Alaniz RD on 8/21/2019 at 9:06 AM    Any diabetes medication dose changes were made via the CDE Protocol and Collaborative Practice Agreement with the patient's primary care provider. A copy of this encounter was shared with the provider.

## 2019-08-22 ENCOUNTER — ANTICOAGULATION THERAPY VISIT (OUTPATIENT)
Dept: ANTICOAGULATION | Facility: CLINIC | Age: 74
End: 2019-08-22
Payer: COMMERCIAL

## 2019-08-22 DIAGNOSIS — I48.20 CHRONIC ATRIAL FIBRILLATION (H): ICD-10-CM

## 2019-08-22 DIAGNOSIS — Z79.01 LONG TERM CURRENT USE OF ANTICOAGULANT THERAPY: ICD-10-CM

## 2019-08-22 LAB — INR POINT OF CARE: 2.6 (ref 0.86–1.14)

## 2019-08-22 PROCEDURE — 99207 ZZC NO CHARGE NURSE ONLY: CPT

## 2019-08-22 PROCEDURE — 36416 COLLJ CAPILLARY BLOOD SPEC: CPT

## 2019-08-22 PROCEDURE — 85610 PROTHROMBIN TIME: CPT | Mod: QW

## 2019-08-22 NOTE — PROGRESS NOTES
ANTICOAGULATION FOLLOW-UP CLINIC VISIT    Patient Name:  Antoine Russo  Date:  2019  Contact Type:  Face to Face    SUBJECTIVE:  Patient Findings     Positives:   Upcoming invasive procedure (DEDE)    Comments:   Patient is having a steroid injection in his back at [place]. He is going down there the . He was already advised by PCP to hold his warfarin for 5 days and he does not need to bridge. Writer instructed Poli to take 2 days of 5 mg when he resumes his warfarin and recheck INR within 1 week after. No other changes or concerns.        Clinical Outcomes     Comments:   Patient is having a steroid injection in his back at [place]. He is going down there the . He was already advised by PCP to hold his warfarin for 5 days and he does not need to bridge. Writer instructed Poli to take 2 days of 5 mg when he resumes his warfarin and recheck INR within 1 week after. No other changes or concerns.           OBJECTIVE    INR Protime   Date Value Ref Range Status   2019 2.6 (A) 0.86 - 1.14 Final       ASSESSMENT / PLAN  INR assessment THER    Recheck INR In: 4 WEEKS    INR Location Clinic      Anticoagulation Summary  As of 2019    INR goal:   2.0-3.0   TTR:   66.7 % (1.2 y)   INR used for dosin.6 (2019)   Warfarin maintenance plan:   3.75 mg (2.5 mg x 1.5) every Mon, Thu; 2.5 mg (2.5 mg x 1) all other days   Full warfarin instructions:   : Hold; : Hold; : Hold; 9/10: Hold; : Hold; : 5 mg; : 5 mg; Otherwise 3.75 mg every Mon, Thu; 2.5 mg all other days   Weekly warfarin total:   20 mg   Plan last modified:   Aparna Kunz RN (2019)   Next INR check:   2019   Priority:   INR   Target end date:   Indefinite    Indications    Chronic atrial fibrillation (H) [I48.2]  Long term current use of anticoagulant therapy [Z79.01]             Anticoagulation Episode Summary     INR check location:       Preferred lab:       Send INR reminders to:    [facility]    Comments:   * 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

## 2019-09-03 ENCOUNTER — TELEPHONE (OUTPATIENT)
Dept: EDUCATION SERVICES | Facility: CLINIC | Age: 74
End: 2019-09-03

## 2019-09-03 DIAGNOSIS — E11.42 TYPE 2 DIABETES MELLITUS WITH DIABETIC POLYNEUROPATHY, WITHOUT LONG-TERM CURRENT USE OF INSULIN (H): Chronic | ICD-10-CM

## 2019-09-03 NOTE — TELEPHONE ENCOUNTER
Diabetes education contact:    Hi, hope you had a nice holiday weekend. Here we go: 8-27 252/300, 8-28  254/224, 8-29 265 (oops, after meal)/199, 8-30 208/220, 8-31 266/188, 9-1 297/299 (another oops, forgot to do my tresiba), 9-2 297/161. Don't know why my pm numbers are high. Suppose due to being less active because of my back. [place], then to [place] next week. Hope they do some good....Take care....Bookem    Bookem,  Please go up to 60 units on the Tresiba    Please increase your base HUmalog dose at meals to: breakfast 13 units, supper 15 units all plus high sliding scale.    Thanks! Ruthann Alaniz RD, CDE    Any diabetes medication dose changes were made via the CDE Protocol and Collaborative Practice Agreement with the patient's primary care provider. A copy of this encounter was shared with the provider.

## 2019-09-10 ENCOUNTER — TELEPHONE (OUTPATIENT)
Dept: EDUCATION SERVICES | Facility: CLINIC | Age: 74
End: 2019-09-10

## 2019-09-10 DIAGNOSIS — E11.42 TYPE 2 DIABETES MELLITUS WITH DIABETIC POLYNEUROPATHY, WITHOUT LONG-TERM CURRENT USE OF INSULIN (H): Chronic | ICD-10-CM

## 2019-09-10 NOTE — TELEPHONE ENCOUNTER
"Diabetes education contact:  60 units Tresiba daily, 13/15 base on lispro on high scale: 9-4 246/143, 9-5 232/198, 9-6 284/203, 9-7 285/109 (forgot to take Tresiba), 9-8 265/258, 9-9 258/284...I'm of to [place] tomorrow. Let's see what they can do with my back/sciatic nerve. Hopefully more than a \"band-aid\". Take care....Bookem    Numbers still running high.    Increase Tresiba to 66 units and Lispro at supper to 17 units.    Ruthann Alaniz RD on 9/10/2019 at 2:19 PM    Any diabetes medication dose changes were made via the CDE Protocol and Collaborative Practice Agreement with the patient's primary care provider. A copy of this encounter was shared with the provider.        "

## 2019-09-12 ENCOUNTER — TRANSFERRED RECORDS (OUTPATIENT)
Dept: HEALTH INFORMATION MANAGEMENT | Facility: CLINIC | Age: 74
End: 2019-09-12

## 2019-09-12 LAB — INR PPP: 1.3 (ref 0.9–1.1)

## 2019-09-13 ENCOUNTER — TRANSFERRED RECORDS (OUTPATIENT)
Dept: HEALTH INFORMATION MANAGEMENT | Facility: CLINIC | Age: 74
End: 2019-09-13

## 2019-09-14 DIAGNOSIS — E11.42 TYPE 2 DIABETES MELLITUS WITH DIABETIC POLYNEUROPATHY, WITHOUT LONG-TERM CURRENT USE OF INSULIN (H): Chronic | ICD-10-CM

## 2019-09-16 RX ORDER — INSULIN DEGLUDEC 200 U/ML
INJECTION, SOLUTION SUBCUTANEOUS
Refills: 1 | OUTPATIENT
Start: 2019-09-16

## 2019-09-16 NOTE — TELEPHONE ENCOUNTER
"Requested Prescriptions   Pending Prescriptions Disp Refills     TRESIBA FLEXTOUCH 200 UNIT/ML pen [Pharmacy Med Name: TRESIBA FLEXTOUCH 200UNIT INJ]  1     Sig:  INJECT 46 UNITS SUBCUTANEOUS AT BEDTIME (INCREASE BY 2 UNITS EVERY 4 DAYS UNTIL MORNING READINGS ARE UNDER 120 (MAX DOSE 52 UNITS)       Long Acting Insulin Protocol Failed - 9/14/2019  5:30 AM        Failed - Blood pressure less than 140/90 in past 6 months     BP Readings from Last 3 Encounters:   08/06/19 (!) 163/92   07/18/19 124/82   07/18/19 142/83                 Passed - LDL on file in past 12 months     Recent Labs   Lab Test 05/22/19  1159   LDL 82             Passed - Microalbumin on file in past 12 months     Recent Labs   Lab Test 03/12/19  0805   MICROL 328   UMALCR 443.24*             Passed - Serum creatinine on file in past 12 months     Recent Labs   Lab Test 08/06/19  0245  07/31/17  0808   CR 0.94   < >  --    CREAT  --   --  1.1    < > = values in this interval not displayed.             Passed - HgbA1C in past 3 or 6 months     If HgbA1C is 8 or greater, it needs to be on file within the past 3 months.  If less than 8, must be on file within the past 6 months.     Recent Labs   Lab Test 07/18/19  0828   A1C 9.7*             Passed - Medication is active on med list        Passed - Patient is age 18 or older        Passed - Recent (6 mo) or future (30 days) visit within the authorizing provider's specialty     Patient had office visit in the last 6 months or has a visit in the next 30 days with authorizing provider or within the authorizing provider's specialty.  See \"Patient Info\" tab in inbasket, or \"Choose Columns\" in Meds & Orders section of the refill encounter.            Last Written Prescription Date:  9/10/19  Last Fill Quantity: 18 ml,  # refills: 3   Last office visit: 7/18/2019 with prescribing provider:     Future Office Visit:   Next 5 appointments (look out 90 days)    Oct 15, 2019  8:45 AM CDT  Return Visit with " Anais Justice MD  [facility] ([facility]) [address] [facility] [address]  [phone]   Oct 17, 2019  8:40 AM CDT  SHORT with Katie Martino MD  [facility] ([facility]) [address] [place] [address]  [phone]

## 2019-09-19 ENCOUNTER — ANTICOAGULATION THERAPY VISIT (OUTPATIENT)
Dept: ANTICOAGULATION | Facility: CLINIC | Age: 74
End: 2019-09-19
Payer: COMMERCIAL

## 2019-09-19 DIAGNOSIS — I48.20 CHRONIC ATRIAL FIBRILLATION (H): ICD-10-CM

## 2019-09-19 DIAGNOSIS — Z79.01 LONG TERM CURRENT USE OF ANTICOAGULANT THERAPY: ICD-10-CM

## 2019-09-19 LAB — INR POINT OF CARE: 2.1 (ref 0.86–1.14)

## 2019-09-19 PROCEDURE — 36416 COLLJ CAPILLARY BLOOD SPEC: CPT

## 2019-09-19 PROCEDURE — 85610 PROTHROMBIN TIME: CPT | Mod: QW

## 2019-09-19 PROCEDURE — 99207 ZZC NO CHARGE NURSE ONLY: CPT

## 2019-09-19 NOTE — PROGRESS NOTES
ANTICOAGULATION FOLLOW-UP CLINIC VISIT    Patient Name:  Antoine Russo  Date:  2019  Contact Type:  Face to Face    SUBJECTIVE:  Patient Findings     Positives:   Missed doses (intentional hold 9-6 to 9-12 for DEDE), Extra doses (9-13 and 9-14 after a hold)    Comments:   Patient had an DEDE at [place] on 19. He states he had relief for a few days and now his back is hurting again and getting worse each day. He needs to get his A1C down before he can qualify for surgery. He is working with diabetic education. No other changes or concerns. Will recheck INR in 3 weeks.        Clinical Outcomes     Comments:   Patient had an DEDE at [place] on 19. He states he had relief for a few days and now his back is hurting again and getting worse each day. He needs to get his A1C down before he can qualify for surgery. He is working with diabetic RECESS.. No other changes or concerns. Will recheck INR in 3 weeks.           OBJECTIVE    INR Protime   Date Value Ref Range Status   2019 2.1 (A) 0.86 - 1.14 Final       ASSESSMENT / PLAN  INR assessment THER    Recheck INR In: 3 WEEKS    INR Location Clinic      Anticoagulation Summary  As of 2019    INR goal:   2.0-3.0   TTR:   68.8 % (1.2 y)   INR used for dosin.1 (2019)   Warfarin maintenance plan:   3.75 mg (2.5 mg x 1.5) every Mon, Thu; 2.5 mg (2.5 mg x 1) all other days   Full warfarin instructions:   3.75 mg every Mon, Thu; 2.5 mg all other days   Weekly warfarin total:   20 mg   No change documented:   Aparna Kunz, RN   Plan last modified:   Aparna Kunz RN (2019)   Next INR check:   10/10/2019   Priority:   INR   Target end date:   Indefinite    Indications    Chronic atrial fibrillation (H) [I48.2]  Long term current use of anticoagulant therapy [Z79.01]             Anticoagulation Episode Summary     INR check location:       Preferred lab:       Send INR reminders to:   [facility]    Comments:   * chronic diarrhea  due to bowel reconstruction      Anticoagulation Care Providers     Provider Role Specialty Phone number    Katie Martino MD [facility] [phone]            See the Encounter Report to view Anticoagulation Flowsheet and Dosing Calendar (Go to Encounters tab in chart review, and find the Anticoagulation Therapy Visit)        Aparna Kunz RN

## 2019-09-24 DIAGNOSIS — I10 HYPERTENSION, BENIGN ESSENTIAL, GOAL BELOW 140/90: Chronic | ICD-10-CM

## 2019-09-24 RX ORDER — LISINOPRIL 5 MG/1
TABLET ORAL
Qty: 90 TABLET | Refills: 0 | Status: SHIPPED | OUTPATIENT
Start: 2019-09-24 | End: 2019-10-17

## 2019-09-24 NOTE — TELEPHONE ENCOUNTER
"Requested Prescriptions   Pending Prescriptions Disp Refills     lisinopril (PRINIVIL/ZESTRIL) 5 MG tablet [Pharmacy Med Name: LISINOPRIL 5MG      TAB] 90 tablet 1     Sig: TAKE 1 TABLET BY MOUTH ONCE DAILY       ACE Inhibitors (Including Combos) Protocol Failed - 9/24/2019  5:36 AM        Failed - Blood pressure under 140/90 in past 12 months     BP Readings from Last 3 Encounters:   08/06/19 (!) 163/92   07/18/19 124/82   07/18/19 142/83                 Passed - Recent (12 mo) or future (30 days) visit within the authorizing provider's specialty     Patient had office visit in the last 12 months or has a visit in the next 30 days with authorizing provider or within the authorizing provider's specialty.  See \"Patient Info\" tab in inbasket, or \"Choose Columns\" in Meds & Orders section of the refill encounter.              Passed - Medication is active on med list        Passed - Patient is age 18 or older        Passed - Normal serum creatinine on file in past 12 months     Recent Labs   Lab Test 08/06/19  0245  07/31/17  0808   CR 0.94   < >  --    CREAT  --   --  1.1    < > = values in this interval not displayed.             Passed - Normal serum potassium on file in past 12 months     Recent Labs   Lab Test 08/06/19  0245   POTASSIUM 4.2             Last Written Prescription Date:  3/27/19  Last Fill Quantity: 90,  # refills: 1   Last office visit: 7/18/2019 with prescribing provider:     Future Office Visit:   Next 5 appointments (look out 90 days)    Oct 15, 2019  8:45 AM CDT  Return Visit with Anais Justice MD  [facility] ([facility] [address] [facility] [id number] [phone]   Oct 17, 2019  8:40 AM CDT  SHORT with Katie Martino MD  [facility] ([facility]) [address] [place] [address] [phone]           "

## 2019-09-29 ENCOUNTER — HEALTH MAINTENANCE LETTER (OUTPATIENT)
Age: 74
End: 2019-09-29

## 2019-10-09 ENCOUNTER — ALLIED HEALTH/NURSE VISIT (OUTPATIENT)
Dept: EDUCATION SERVICES | Facility: CLINIC | Age: 74
End: 2019-10-09
Payer: COMMERCIAL

## 2019-10-09 DIAGNOSIS — E11.42 TYPE 2 DIABETES MELLITUS WITH DIABETIC POLYNEUROPATHY, WITHOUT LONG-TERM CURRENT USE OF INSULIN (H): Chronic | ICD-10-CM

## 2019-10-09 PROCEDURE — G0108 DIAB MANAGE TRN  PER INDIV: HCPCS | Performed by: DIETITIAN, REGISTERED

## 2019-10-09 NOTE — PATIENT INSTRUCTIONS
1.  If you want a cheaper meter, use the Relion meter at [facility], strips are cheaper.    2. Humalog 20 units at breakfast and supper plus the high sliding scale.  Take the sliding scale only at 12-1 since you don't eat then.    3.  Tresiba 70 units at bedtime    4.  Test your blood sugar in the morning, around 12-1 check blood sugar and only do the sliding scale if you do not eat, then check again before supper.    4. Stop the sweetened Tea.

## 2019-10-09 NOTE — PROGRESS NOTES
"Diabetes Self-Management Education & Support    Diabetes Education Self Management & Training    SUBJECTIVE/OBJECTIVE:  Presents for: Individual review  Accompanied by: Self  Diabetes education in the past 24mo: No  Focus of Visit: Patient Unsure  Diabetes type: Type 2  Disease course: Getting harder to manage  Transportation concerns: No  Other concerns:: None  Cultural Influences/Ethnic Background:  American      Diabetes Symptoms & Complications          Patient Problem List and Family Medical History reviewed for relevant medical history, current medical status, and diabetes risk factors.    Vitals:  There were no vitals taken for this visit.  Estimated body mass index is 28.41 kg/m  as calculated from the following:    Height as of 7/18/19: 1.778 m (5' 10\").    Weight as of 7/18/19: 89.8 kg (198 lb).   Last 3 BP:   BP Readings from Last 3 Encounters:   08/06/19 (!) 163/92   07/18/19 124/82   07/18/19 142/83       History   Smoking Status     Former Smoker     Packs/day: 1.00     Years: 40.00     Types: Cigarettes     Quit date: 11/24/2006   Smokeless Tobacco     Never Used       Labs:  Lab Results   Component Value Date    A1C 9.7 07/18/2019     Lab Results   Component Value Date     08/06/2019     Lab Results   Component Value Date    LDL 82 05/22/2019     HDL Cholesterol   Date Value Ref Range Status   05/22/2019 31 (L) >39 mg/dL Final   ]  GFR Estimate   Date Value Ref Range Status   08/06/2019 80 >60 mL/min/[1.73_m2] Final     Comment:     Non  GFR Calc  Starting 12/18/2018, serum creatinine based estimated GFR (eGFR) will be   calculated using the Chronic Kidney Disease Epidemiology Collaboration   (CKD-EPI) equation.       GFR Estimate If Black   Date Value Ref Range Status   08/06/2019 >90 >60 mL/min/[1.73_m2] Final     Comment:      GFR Calc  Starting 12/18/2018, serum creatinine based estimated GFR (eGFR) will be   calculated using the Chronic Kidney Disease " Epidemiology Collaboration   (CKD-EPI) equation.       Lab Results   Component Value Date    CR 0.94 08/06/2019     No results found for: MICROALBUMIN    Healthy Eating  Healthy Eating Assessed Today: Yes  Cultural/Voodoo diet restrictions?: No  Breakfast: potstickers  Lunch: nothing  Dinner: chili, crackers 10, cheese  Snacks: none  Beverages: Water(was drinking sweet tea)  Has patient met with a dietitian in the past?: Yes    Being Active  Being Active Assessed Today: Yes  Exercise:: Currently not exercising(back issues recently)    Monitoring                                   Taking Medications  Diabetes Medication(s)     Insulin       insulin degludec (TRESIBA) 200 UNIT/ML pen    Take 70 units at bedtime (increase by 2 units every 4 days until am readings are under 120) max dose: 80.     Insulin Lispro (HUMALOG KWIKPEN) 200 UNIT/ML soln    Take 20 units with breakfast and lunch, take 20 units with supper. All plus the high sliding scale.  Max dose: 96 units               Problem Solving   not assessed              Reducing Risks   not assessed    Healthy Coping     Patient Activation Measure Survey Score:  RIDDHI Score (Last Two) 2/15/2011   RIDDHI Raw Score 41   Activation Score 63.2   RIDDHI Level 3       ASSESSMENT:  Numbers still running higher needs more insulin, will continue to increase insulin until we get at goal.  PT is very insulin resistant.  Continue to send me number so we can adjust.  Stop the sweetened tea that is not helping numbers.        Patient's most recent   Lab Results   Component Value Date    A1C 9.7 07/18/2019    is not meeting goal of <7.0    INTERVENTION:   Diabetes knowledge and skills assessment:     Patient is knowledgeable in diabetes management concepts related to: Healthy Eating, Being Active and Monitoring    Patient needs further education on the following diabetes management concepts: Healthy Eating, Being Active, Monitoring, Taking Medication, Problem Solving, Reducing Risks and  Healthy Coping    Based on learning assessment above, most appropriate setting for further diabetes education would be: Individual setting.    Education provided today on:  AADE Self-Care Behaviors:  Taking Medication: adjusted medication  Stop tea    Opportunities for ongoing education and support in diabetes-self management were discussed.    Pt verbalized understanding of concepts discussed and recommendations provided today.       Education Materials Provided:  No new materials provided today    PLAN:  See Patient Instructions for co-developed, patient-stated behavior change goals.  AVS printed and provided to patient today. See Follow-Up section for recommended follow-up.      Time Spent: 30 minutes  Encounter Type: Individual    Any diabetes medication dose changes were made via the CDE Protocol and Collaborative Practice Agreement with the patient's primary care provider. A copy of this encounter was shared with the provider.

## 2019-10-10 ENCOUNTER — ANTICOAGULATION THERAPY VISIT (OUTPATIENT)
Dept: ANTICOAGULATION | Facility: CLINIC | Age: 74
End: 2019-10-10
Payer: COMMERCIAL

## 2019-10-10 DIAGNOSIS — I48.20 CHRONIC ATRIAL FIBRILLATION (H): ICD-10-CM

## 2019-10-10 DIAGNOSIS — Z79.01 LONG TERM CURRENT USE OF ANTICOAGULANT THERAPY: ICD-10-CM

## 2019-10-10 LAB — INR POINT OF CARE: 3.9 (ref 0.86–1.14)

## 2019-10-10 PROCEDURE — 85610 PROTHROMBIN TIME: CPT | Mod: QW

## 2019-10-10 PROCEDURE — 99207 ZZC NO CHARGE NURSE ONLY: CPT

## 2019-10-10 PROCEDURE — 36416 COLLJ CAPILLARY BLOOD SPEC: CPT

## 2019-10-10 NOTE — PROGRESS NOTES
ANTICOAGULATION FOLLOW-UP CLINIC VISIT    Patient Name:  Antoine Russo  Date:  10/10/2019  Contact Type:  Face to Face    SUBJECTIVE:  Patient Findings     Positives:   Extra doses    Comments:   No changes in diet, activity level, medications (including over the counter), or health. Patient is still working with diabetic education to lower his blood sugars but his numbers are lower than they have been in the past. He continues to have back pain, which is chronic. No recent illnesses, diarrhea or pancreatitis flares. He does continue to drink 2 alcoholic beverages per day on average. No bruising or bleeding s/s.    No missed doses of warfarin. Patient took dosing as prescribed, although he did take 3.75 mg yesterday because he thought yesterday was Thurs. No signs of clots or bleeding concerns. Patient will hold his warfarin today, then resume usual dose and recheck INR in 2 weeks.         Clinical Outcomes     Comments:   No changes in diet, activity level, medications (including over the counter), or health. Patient is still working with diabetic education to lower his blood sugars but his numbers are lower than they have been in the past. He continues to have back pain, which is chronic. No recent illnesses, diarrhea or pancreatitis flares. He does continue to drink 2 alcoholic beverages per day on average. No bruising or bleeding s/s.    No missed doses of warfarin. Patient took dosing as prescribed, although he did take 3.75 mg yesterday because he thought yesterday was Thurs. No signs of clots or bleeding concerns. Patient will hold his warfarin today, then resume usual dose and recheck INR in 2 weeks.            OBJECTIVE    INR Protime   Date Value Ref Range Status   10/10/2019 3.9 (A) 0.86 - 1.14 Final       ASSESSMENT / PLAN  INR assessment SUPRA    Recheck INR In: 2 WEEKS    INR Location Clinic      Anticoagulation Summary  As of 10/10/2019    INR goal:   2.0-3.0   TTR:   64.2 % (1.3 y)   INR used  for dosing:   3.9! (10/10/2019)   Warfarin maintenance plan:   3.75 mg (2.5 mg x 1.5) every Mon, Thu; 2.5 mg (2.5 mg x 1) all other days   Full warfarin instructions:   10/10: Hold; Otherwise 3.75 mg every Mon, Thu; 2.5 mg all other days   Weekly warfarin total:   20 mg   Plan last modified:   Aparna Kunz RN (7/18/2019)   Next INR check:   10/24/2019   Priority:   INR   Target end date:   Indefinite    Indications    Chronic atrial fibrillation [I48.20]  Long term current use of anticoagulant therapy [Z79.01]             Anticoagulation Episode Summary     INR check location:       Preferred lab:       Send INR reminders to:   [facility]    Comments:   * 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

## 2019-10-15 ENCOUNTER — OFFICE VISIT (OUTPATIENT)
Dept: NEUROLOGY | Facility: CLINIC | Age: 74
End: 2019-10-15
Payer: COMMERCIAL

## 2019-10-15 VITALS
RESPIRATION RATE: 12 BRPM | TEMPERATURE: 96.7 F | SYSTOLIC BLOOD PRESSURE: 147 MMHG | DIASTOLIC BLOOD PRESSURE: 67 MMHG | HEART RATE: 76 BPM

## 2019-10-15 DIAGNOSIS — G62.9 PERIPHERAL POLYNEUROPATHY: Primary | ICD-10-CM

## 2019-10-15 PROCEDURE — 99214 OFFICE O/P EST MOD 30 MIN: CPT | Performed by: PSYCHIATRY & NEUROLOGY

## 2019-10-15 NOTE — LETTER
10/15/2019         RE: Antoine Russo  [address] [address]        Dear Colleague,    Thank you for referring your patient, Antoine Russo, to the [facility]. Please see a copy of my visit note below.    ESTABLISHED PATIENT NEUROLOGY NOTE    DATE OF VISIT: 10/15/2019  MRN: [id number]  PATIENT NAME: Antoine Russo  YOB: 1945    Chief Complaint   Patient presents with     RECHECK     Neuropathy     SUBJECTIVE:                                                      HISTORY OF PRESENT ILLNESS:  Antoine is here for follow up rRegarding peripheral neuropathy. He has history of diabetes and moderate-heavy alcohol use. 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 He was also notably seen recently in Orthopedics clinic for lumbar degeneration and cauda compression. It appears that surgery was recommended by Dr. Carlin. The patient is on Gabapentin: 600mg/600mg/900mg. I also recommended he start a B12 supplement when I saw Poli about 6 months ago.     He says the neuropathy symptoms are the same, maybe a tad worse. He occasionally forgets the midday dose of gabapentin but has been better about this lately. He is having some weakness in the Left leg related to some groin pain and the lower back pain. He says he has to wait to have surgery until his A1c is lowered. He says the back and Left leg pain started rather suddenly, without injury, in July. He says the injection he had done in September provided relief for only a few days.    No side effects to report with the gabapentin. He feels that the current dose is adequate. He is taking the B12 supplement as well. No sensory changes or weakness in the UEs. He denies problems with bowel or bladder control.     When asked about the mole on his Left cheek, he says he plans to talk to his primary care provider about this when he sees her later this week. It has grown in size.     CURRENT MEDICATIONS:   gabapentin  (NEURONTIN) 300 MG capsule, Take 1 capsule (300 mg) by mouth At Bedtime 1 tablet along with his 600mg dose at bedtime. Total gabapentin dose is 600mg/600mg/900mg.  gabapentin (NEURONTIN) 600 MG tablet, Take 1 tablet (600 mg) by mouth 3 times daily Along with 300mg for a total of 900mg for bedtime dose  insulin degludec (TRESIBA) 200 UNIT/ML pen, Take 70 units at bedtime (increase by 2 units every 4 days until am readings are under 120) max dose: 80.  Insulin Lispro (HUMALOG KWIKPEN) 200 UNIT/ML soln, Take 20 units with breakfast and lunch, take 20 units with supper. All plus the high sliding scale.  Max dose: 96 units  lisinopril (PRINIVIL/ZESTRIL) 5 MG tablet, TAKE 1 TABLET BY MOUTH ONCE DAILY  metoprolol succinate ER (TOPROL-XL) 50 MG 24 hr tablet, TAKE 1 & 1/2 (ONE & ONE-HALF) TABLETS BY MOUTH TWICE DAILY  multivitamin, therapeutic with minerals (THERA-VIT-M) TABS, Take 1 tablet by mouth daily.  tamsulosin (FLOMAX) 0.4 MG capsule, TAKE ONE CAPSULE BY MOUTH ONCE DAILY  vitamin B-12 (CYANOCOBALAMIN) 100 MCG tablet, Take 100 mcg by mouth daily  warfarin (COUMADIN) 2.5 MG tablet, Take 3.75 mg every Mon and Thur; 2.5 mg all other days or as directed by the Anticoagulation Clinic  [] acetaminophen (TYLENOL) 500 MG tablet, Take 2 tablets (1,000 mg) by mouth every 8 hours as needed for fever or pain  ASPIRIN NOT PRESCRIBED (INTENTIONAL), Antiplatelet medication not prescribed intentionally due to Current anticoagulant therapy (warfarin/enoxaparin)  blood glucose (CONTOUR NEXT TEST) test strip, Use to test blood sugar 2 times daily or as directed.  insulin pen needle (BD LUIS U/F) 32G X 4 MM miscellaneous, Use 3 daily as directed.  methocarbamol (ROBAXIN) 750 MG tablet, Take 1-2 tablets (750-1,500 mg) by mouth 4 times daily as needed for muscle spasms  morphine (MSIR) 15 MG IR tablet, Take 1-2 tablets (15-30 mg) by mouth every 4 hours as needed for moderate to severe pain  [] ondansetron (ZOFRAN ODT) 4 MG  ODT tab, Take 1 tablet (4 mg) by mouth every 8 hours as needed for nausea or vomiting  [] oxyCODONE-acetaminophen (PERCOCET) 5-325 MG tablet, Take 1-2 tablets by mouth every 4 hours as needed for severe pain    No current facility-administered medications on file prior to visit.       RECENT DIAGNOSTIC STUDIES:   Labs:   Results for orders placed or performed in visit on 10/10/19   INR point of care   Result Value Ref Range    INR Protime 3.9 (A) 0.86 - 1.14       Imaging:   MRI Lumbar Spine (7..):  IMPRESSION:  Degenerative changes in the lumbar spine as described  above. Findings are most pronounced at the L3-L4 level where there is  severe central spinal canal narrowing as well as mild right and  moderate left neural foraminal narrowing.    Imaging reviewed by me. Agree with Radiology read.     REVIEW OF SYSTEMS:                                                      10-point review of systems is negative except as mentioned above in HPI.     EXAM:                                                      Physical Exam:   Vitals: BP (!) 147/67 (BP Location: Left arm, Patient Position: Sitting, Cuff Size: Adult Regular)   Pulse 76   Temp 96.7  F (35.9  C) (Tympanic)   Resp 12   BMI= There is no height or weight on file to calculate BMI.  GENERAL: NAD.   HEENT: NC/AT. Multiple scars along the anterior neck. Several moles, largest on Left cheek.  CV: RRR. S1 and S2.  EXTR: Feet are flat.   Focused Neurologic:  MENTAL STATUS: Alert, attentive. Speech is fluent. Normal comprehension. Normal concentration. Adequate fund of knowledge.   CRANIAL NERVES: PERRL. Facial sensation and movement normal. EOM full. Hearing intact to conversation. Trapezius strength intact. Tongue cannot fully protrude due to prior neck surgery.   MOTOR: Slight weakness with Left knee extension. Otherwise strength is 5/5 in proximal and distal muscle groups of lower extremities. Tone and bulk normal.   SENSATION: Pinprick in hands is  normal. Cannot feel pinprick distal to mid-calf bilaterally. Poor proprioception at great toes. Vibration: Diminished at both ankles.   COORDINATION: Normal fine motor movements of the hands. Normal heel to shin.   STATION AND GAIT:  Negative Romberg. Casual gait is antalgic.  Right hand-dominant.     ASSESSMENT and PLAN:                                                      Assessment and Plan:    ICD-10-CM    1. Peripheral polyneuropathy G62.9         Mr. Russo is a pleasant 72 yo man with multiple medical comorbidities including diabetes and prior heavy alcohol use here for follow-up regarding lower extremity neuropathy. He feels that the neuropathy symptoms are stable. We will not make any changes in the Gabapentin dosing for now.    His current, new issue is lumbar radiculopathy and cauda compression. This is leading to some LLE weakness and increased pain. He plans to have surgery once his glucose is under better control.     I would like to see Poli horne in about 6 months. Hopefully, he will have had his surgery by then and will be feeling better overall. I asked him to let me know if the neuropathy symptoms worsen or change in the interim. He understands and agrees with the plan.     Poli or Saleem to follow up with Primary Care provider regarding elevated blood pressure.    Patient Instructions:  -- Continue the Gabapentin: 600mg/600mg/900mg.  -- Talk to Dr. Gross about the mole on your cheek. I think you should have this looked at by Dermatology, as we dicussed.   -- Return to Neurology in 6 months. Please let us know if there are any concerns in the meantime.   -- Good luck with the back surgery!    Total Time: 25 minutes were spent with the patient and in chart review. More than 50% of the time spent on counseling (as described above in Assessment and Plan/Instructions) /coordinating the care.    Anais Justice MD  Neurology                    Again, thank you for allowing me to  participate in the care of your patient.        Sincerely,        Anais Justice MD

## 2019-10-15 NOTE — PATIENT INSTRUCTIONS
Plan:    -- Continue the Gabapentin: 600mg/600mg/900mg.  -- Talk to Dr. Gross about the mole on your cheek. I think you should have this looked at by Dermatology, as we dicussed.   -- Return to Neurology in 6 months. Please let us know if there are any concerns in the meantime.   -- Good luck with the back surgery!

## 2019-10-15 NOTE — PROGRESS NOTES
ESTABLISHED PATIENT NEUROLOGY NOTE    DATE OF VISIT: 10/15/2019  MRN: [id number]  PATIENT NAME: Antoine Russo  YOB: 1945    Chief Complaint   Patient presents with     RECHECK     Neuropathy     SUBJECTIVE:                                                      HISTORY OF PRESENT ILLNESS:  Antoine is here for follow up rRegarding peripheral neuropathy. He has history of diabetes and moderate-heavy alcohol use. 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 He was also notably seen recently in Orthopedics clinic for lumbar degeneration and cauda compression. It appears that surgery was recommended by Dr. Carlin. The patient is on Gabapentin: 600mg/600mg/900mg. I also recommended he start a B12 supplement when I saw Poli about 6 months ago.     He says the neuropathy symptoms are the same, maybe a tad worse. He occasionally forgets the midday dose of gabapentin but has been better about this lately. He is having some weakness in the Left leg related to some groin pain and the lower back pain. He says he has to wait to have surgery until his A1c is lowered. He says the back and Left leg pain started rather suddenly, without injury, in July. He says the injection he had done in September provided relief for only a few days.    No side effects to report with the gabapentin. He feels that the current dose is adequate. He is taking the B12 supplement as well. No sensory changes or weakness in the UEs. He denies problems with bowel or bladder control.     When asked about the mole on his Left cheek, he says he plans to talk to his primary care provider about this when he sees her later this week. It has grown in size.     CURRENT MEDICATIONS:   gabapentin (NEURONTIN) 300 MG capsule, Take 1 capsule (300 mg) by mouth At Bedtime 1 tablet along with his 600mg dose at bedtime. Total gabapentin dose is 600mg/600mg/900mg.  gabapentin (NEURONTIN) 600 MG tablet, Take 1 tablet (600 mg) by mouth 3 times daily  Along with 300mg for a total of 900mg for bedtime dose  insulin degludec (TRESIBA) 200 UNIT/ML pen, Take 70 units at bedtime (increase by 2 units every 4 days until am readings are under 120) max dose: 80.  Insulin Lispro (HUMALOG KWIKPEN) 200 UNIT/ML soln, Take 20 units with breakfast and lunch, take 20 units with supper. All plus the high sliding scale.  Max dose: 96 units  lisinopril (PRINIVIL/ZESTRIL) 5 MG tablet, TAKE 1 TABLET BY MOUTH ONCE DAILY  metoprolol succinate ER (TOPROL-XL) 50 MG 24 hr tablet, TAKE 1 & 1/2 (ONE & ONE-HALF) TABLETS BY MOUTH TWICE DAILY  multivitamin, therapeutic with minerals (THERA-VIT-M) TABS, Take 1 tablet by mouth daily.  tamsulosin (FLOMAX) 0.4 MG capsule, TAKE ONE CAPSULE BY MOUTH ONCE DAILY  vitamin B-12 (CYANOCOBALAMIN) 100 MCG tablet, Take 100 mcg by mouth daily  warfarin (COUMADIN) 2.5 MG tablet, Take 3.75 mg every Mon and Thur; 2.5 mg all other days or as directed by the Anticoagulation Clinic  [] acetaminophen (TYLENOL) 500 MG tablet, Take 2 tablets (1,000 mg) by mouth every 8 hours as needed for fever or pain  ASPIRIN NOT PRESCRIBED (INTENTIONAL), Antiplatelet medication not prescribed intentionally due to Current anticoagulant therapy (warfarin/enoxaparin)  blood glucose (CONTOUR NEXT TEST) test strip, Use to test blood sugar 2 times daily or as directed.  insulin pen needle (BD LUIS U/F) 32G X 4 MM miscellaneous, Use 3 daily as directed.  methocarbamol (ROBAXIN) 750 MG tablet, Take 1-2 tablets (750-1,500 mg) by mouth 4 times daily as needed for muscle spasms  morphine (MSIR) 15 MG IR tablet, Take 1-2 tablets (15-30 mg) by mouth every 4 hours as needed for moderate to severe pain  [] ondansetron (ZOFRAN ODT) 4 MG ODT tab, Take 1 tablet (4 mg) by mouth every 8 hours as needed for nausea or vomiting  [] oxyCODONE-acetaminophen (PERCOCET) 5-325 MG tablet, Take 1-2 tablets by mouth every 4 hours as needed for severe pain    No current facility-administered  medications on file prior to visit.       RECENT DIAGNOSTIC STUDIES:   Labs:   Results for orders placed or performed in visit on 10/10/19   INR point of care   Result Value Ref Range    INR Protime 3.9 (A) 0.86 - 1.14       Imaging:   MRI Lumbar Spine (7.22.19):  IMPRESSION:  Degenerative changes in the lumbar spine as described  above. Findings are most pronounced at the L3-L4 level where there is  severe central spinal canal narrowing as well as mild right and  moderate left neural foraminal narrowing.    Imaging reviewed by me. Agree with Radiology read.     REVIEW OF SYSTEMS:                                                      10-point review of systems is negative except as mentioned above in HPI.     EXAM:                                                      Physical Exam:   Vitals: BP (!) 147/67 (BP Location: Left arm, Patient Position: Sitting, Cuff Size: Adult Regular)   Pulse 76   Temp 96.7  F (35.9  C) (Tympanic)   Resp 12   BMI= There is no height or weight on file to calculate BMI.  GENERAL: NAD.   HEENT: NC/AT. Multiple scars along the anterior neck. Several moles, largest on Left cheek.  CV: RRR. S1 and S2.  EXTR: Feet are flat.   Focused Neurologic:  MENTAL STATUS: Alert, attentive. Speech is fluent. Normal comprehension. Normal concentration. Adequate fund of knowledge.   CRANIAL NERVES: PERRL. Facial sensation and movement normal. EOM full. Hearing intact to conversation. Trapezius strength intact. Tongue cannot fully protrude due to prior neck surgery.   MOTOR: Slight weakness with Left knee extension. Otherwise strength is 5/5 in proximal and distal muscle groups of lower extremities. Tone and bulk normal.   SENSATION: Pinprick in hands is normal. Cannot feel pinprick distal to mid-calf bilaterally. Poor proprioception at great toes. Vibration: Diminished at both ankles.   COORDINATION: Normal fine motor movements of the hands. Normal heel to shin.   DTRs: Right patellar more brisk than Left  (trace).   STATION AND GAIT: Negative Romberg. Casual gait is antalgic.  Right hand-dominant.     ASSESSMENT and PLAN:                                                      Assessment and Plan:    ICD-10-CM    1. Peripheral polyneuropathy G62.9         Mr. Russo is a pleasant 74 yo man with multiple medical comorbidities including diabetes and prior heavy alcohol use here for follow-up regarding lower extremity neuropathy. He feels that the neuropathy symptoms are stable. We will not make any changes in the Gabapentin dosing for now.    His current, new issue is lumbar radiculopathy and cauda compression. This is leading to some LLE weakness and increased pain. He plans to have surgery once his glucose is under better control.     I would like to see Poli back in about 6 months. Hopefully, he will have had his surgery by then and will be feeling better overall. I asked him to let me know if the neuropathy symptoms worsen or change in the interim. He understands and agrees with the plan.     Poli or Saleem to follow up with Primary Care provider regarding elevated blood pressure.    Patient Instructions:  -- Continue the Gabapentin: 600mg/600mg/900mg.  -- Talk to Dr. Gross about the mole on your cheek. I think you should have this looked at by Dermatology, as we dicussed.   -- Return to Neurology in 6 months. Please let us know if there are any concerns in the meantime.   -- Good luck with the back surgery!    Total Time: 25 minutes were spent with the patient and in chart review. More than 50% of the time spent on counseling (as described above in Assessment and Plan/Instructions) /coordinating the care.    Anais Justice MD  Neurology

## 2019-10-16 ENCOUNTER — TELEPHONE (OUTPATIENT)
Dept: EDUCATION SERVICES | Facility: CLINIC | Age: 74
End: 2019-10-16

## 2019-10-16 DIAGNOSIS — E11.42 TYPE 2 DIABETES MELLITUS WITH DIABETIC POLYNEUROPATHY, WITHOUT LONG-TERM CURRENT USE OF INSULIN (H): Chronic | ICD-10-CM

## 2019-10-16 NOTE — TELEPHONE ENCOUNTER
Diabetes education contact:    Here we go 10-10 180/205/230, 10-11 219/113/192, 10-20982/253/158, 10-13 177/198/238, 10-14 178/123/174. Still bouncing around. Trying to watch my diet though.. Take care...Bookem    Bookem,  Increase:   Humalog  Breakfast: 20 (no change)  Lunch: 22 units  Supper: 22 units    Tresiba:    74 units    Email again next Tuesday  Ruthann Alaniz RD, CDE

## 2019-10-17 ENCOUNTER — OFFICE VISIT (OUTPATIENT)
Dept: FAMILY MEDICINE | Facility: CLINIC | Age: 74
End: 2019-10-17
Payer: COMMERCIAL

## 2019-10-17 VITALS
BODY MASS INDEX: 30.06 KG/M2 | OXYGEN SATURATION: 94 % | WEIGHT: 210 LBS | TEMPERATURE: 97.1 F | SYSTOLIC BLOOD PRESSURE: 132 MMHG | DIASTOLIC BLOOD PRESSURE: 80 MMHG | RESPIRATION RATE: 20 BRPM | HEIGHT: 70 IN | HEART RATE: 75 BPM

## 2019-10-17 DIAGNOSIS — L98.9 SKIN LESION: ICD-10-CM

## 2019-10-17 DIAGNOSIS — I48.20 CHRONIC ATRIAL FIBRILLATION (H): ICD-10-CM

## 2019-10-17 DIAGNOSIS — I10 HYPERTENSION, BENIGN ESSENTIAL, GOAL BELOW 140/90: Chronic | ICD-10-CM

## 2019-10-17 DIAGNOSIS — E11.42 TYPE 2 DIABETES MELLITUS WITH DIABETIC POLYNEUROPATHY, WITHOUT LONG-TERM CURRENT USE OF INSULIN (H): Primary | ICD-10-CM

## 2019-10-17 DIAGNOSIS — M48.061 SPINAL STENOSIS OF LUMBAR REGION, UNSPECIFIED WHETHER NEUROGENIC CLAUDICATION PRESENT: ICD-10-CM

## 2019-10-17 LAB — HBA1C MFR BLD: 9 % (ref 0–5.6)

## 2019-10-17 PROCEDURE — 36415 COLL VENOUS BLD VENIPUNCTURE: CPT | Performed by: FAMILY MEDICINE

## 2019-10-17 PROCEDURE — 99214 OFFICE O/P EST MOD 30 MIN: CPT | Performed by: FAMILY MEDICINE

## 2019-10-17 PROCEDURE — 83036 HEMOGLOBIN GLYCOSYLATED A1C: CPT | Performed by: FAMILY MEDICINE

## 2019-10-17 RX ORDER — LISINOPRIL 5 MG/1
5 TABLET ORAL DAILY
Qty: 90 TABLET | Refills: 3 | Status: SHIPPED | OUTPATIENT
Start: 2019-10-17 | End: 2020-12-22

## 2019-10-17 ASSESSMENT — MIFFLIN-ST. JEOR: SCORE: 1713.8

## 2019-10-17 NOTE — NURSING NOTE
"Chief Complaint   Patient presents with     Diabetes       Initial /80 (BP Location: Right arm)   Pulse 75   Temp 97.1  F (36.2  C) (Tympanic)   Resp 20   Ht 1.778 m (5' 10\")   Wt 95.3 kg (210 lb)   SpO2 94%   BMI 30.13 kg/m   Estimated body mass index is 30.13 kg/m  as calculated from the following:    Height as of this encounter: 1.778 m (5' 10\").    Weight as of this encounter: 95.3 kg (210 lb).    Patient presents to the clinic using No DME    Health Maintenance that is potentially due pending provider review:  NONE    n/a    Is there anyone who you would like to be able to receive your results? No  If yes have patient fill out VIDYA    "

## 2019-10-17 NOTE — PROGRESS NOTES
Subjective     Antoine Russo is a 73 year old male who presents to clinic today for the following health issues:    HPI   Diabetes Follow-up      Morning 158 - 3 x a day    What time of day are you checking your blood sugars (select all that apply)?  Before and after meals    Have you had any blood sugars above 200?  Yes saw Ruthann    Have you had any blood sugars below 70?  No    What symptoms do you notice when your blood sugar is low?  None    What concerns do you have today about your diabetes? Blood sugar is often over 200     Do you have any of these symptoms? (Select all that apply)  Numbness in feet     Have you had a diabetic eye exam in the last 12 months? Yes- Date of last eye exam: Nov    Lab Results   Component Value Date    A1C 9.0 10/17/2019    A1C 9.7 07/18/2019    A1C 11.6 04/16/2019    A1C 13.0 03/12/2019    A1C 7.6 11/23/2018      is working with Ruthann Cheatham to get down below 8.0 for his back surgery at [facility].   On Tresiba, lispro.   He had a shot instead, lasted only 3 days.     He refuses to use a statin    Hypertension  On metoprolol, lisinopril    BP Readings from Last 6 Encounters:   10/17/19 132/80   10/15/19 (!) 147/67   08/06/19 (!) 163/92   07/18/19 124/82   07/18/19 142/83   07/08/19 135/71      Has some lesions on his face, one on the left is spreading out      afib  On metoprolol, warfarin    BP Readings from Last 2 Encounters:   10/17/19 132/80   10/15/19 (!) 147/67     Hemoglobin A1C (%)   Date Value   10/17/2019 9.0 (H)   07/18/2019 9.7 (H)     LDL Cholesterol Calculated (mg/dL)   Date Value   05/22/2019 82   10/22/2018 90       Diabetes Management Resources    additonal problems for provider to add (Optional):[id number]}    Recent Labs   Lab Test 10/17/19  0827 08/06/19  0245 07/18/19  0828 05/23/19  0618 05/22/19  1159 05/22/19  0447  04/16/19  2142  01/17/19  0834  10/22/18  1651  04/24/18  0940  03/16/18  0848   A1C 9.0*  --  9.7*  --   --   --   --  11.6*   < >  --    < >  "6.9*   < >  --   --  7.7*   LDL  --   --   --   --  82  --   --   --   --   --   --  90  --   --   --  69   HDL  --   --   --   --  31*  --   --   --   --   --   --  43  --   --   --  30*   TRIG  --   --   --   --  188*  --   --   --   --   --   --  148  --   --   --  221*   ALT  --  38  --   --   --  25  --  28  --  31   < >  --    < > 11   < > 30   CR  --  0.94  --  1.15  --  1.09   < > 1.24  --  0.95   < >  --    < > 0.92   < > 1.11   GFRESTIMATED  --  80  --  63  --  68   < > 58*  --  80   < >  --    < > 81   < > 65   GFRESTBLACK  --  >90  --  74  --  79   < > 67  --  >90   < >  --    < > >90   < > 79   POTASSIUM  --  4.2  --  4.3  --  4.5   < > 4.4  --  4.5   < >  --    < > 4.8   < > 4.7   TSH  --   --   --   --   --   --   --   --   --  1.58  --   --   --  1.12  --   --     < > = values in this interval not displayed.      BP Readings from Last 3 Encounters:   10/17/19 132/80   10/15/19 (!) 147/67   08/06/19 (!) 163/92    Wt Readings from Last 3 Encounters:   10/17/19 95.3 kg (210 lb)   07/18/19 89.8 kg (198 lb)   07/18/19 88.5 kg (195 lb)              Reviewed and updated as needed this visit by Provider  Tobacco  Allergies  Meds  Problems  Med Hx  Surg Hx  Fam Hx         Review of Systems   ROS COMP: Constitutional, HEENT, cardiovascular, pulmonary, gi and gu systems are negative, except as otherwise noted.      Objective    /80 (BP Location: Right arm)   Pulse 75   Temp 97.1  F (36.2  C) (Tympanic)   Resp 20   Ht 1.778 m (5' 10\")   Wt 95.3 kg (210 lb)   SpO2 94%   BMI 30.13 kg/m    Body mass index is 30.13 kg/m .  Physical Exam   GENERAL: healthy, alert and no distress  NECK: no adenopathy, no asymmetry, masses, or scars and thyroid normal to palpation  RESP: lungs clear to auscultation - no rales, rhonchi or wheezes  CV: regular rate and rhythm, normal S1 S2, no S3 or S4, no murmur, click or rub, no peripheral edema and peripheral pulses strong  ABDOMEN: soft, nontender, no " hepatosplenomegaly, no masses and bowel sounds normal  MS: no gross musculoskeletal defects noted, no edema  Skin: has a 1.5 x 0.75 cm raised lightly pigmented lesion consistent with seborrheic keratosis on left cheek, one on right 5 x 5 mm    Results for orders placed or performed in visit on 10/17/19   Hemoglobin A1c   Result Value Ref Range    Hemoglobin A1C 9.0 (H) 0 - 5.6 %            ASSESSMENT:  1. Type 2 diabetes mellitus with diabetic polyneuropathy, without long-term current use of insulin (H)    2. Skin lesion    3. Hypertension, benign essential, goal below 140/90    4. Chronic atrial fibrillation    5. Spinal stenosis of lumbar region, unspecified whether neurogenic claudication present    6. Hyperlipidemia LDL goal <100        PLAN:  Orders Placed This Encounter     Hemoglobin A1c     DERMATOLOGY REFERRAL     lisinopril (PRINIVIL/ZESTRIL) 5 MG tablet       Patient Instructions   Keep working with Ruthann, you are going in the right direction    See dermatology       Katie Yoder MD

## 2019-10-24 ENCOUNTER — ANTICOAGULATION THERAPY VISIT (OUTPATIENT)
Dept: ANTICOAGULATION | Facility: CLINIC | Age: 74
End: 2019-10-24
Payer: COMMERCIAL

## 2019-10-24 DIAGNOSIS — I48.20 CHRONIC ATRIAL FIBRILLATION (H): ICD-10-CM

## 2019-10-24 DIAGNOSIS — Z79.01 LONG TERM CURRENT USE OF ANTICOAGULANT THERAPY: ICD-10-CM

## 2019-10-24 LAB — INR POINT OF CARE: 3.4 (ref 0.86–1.14)

## 2019-10-24 PROCEDURE — 36416 COLLJ CAPILLARY BLOOD SPEC: CPT

## 2019-10-24 PROCEDURE — 99207 ZZC NO CHARGE NURSE ONLY: CPT

## 2019-10-24 PROCEDURE — 85610 PROTHROMBIN TIME: CPT | Mod: QW

## 2019-10-24 RX ORDER — WARFARIN SODIUM 2.5 MG/1
TABLET ORAL
Qty: 100 TABLET | Refills: 0
Start: 2019-10-24 | End: 2019-11-25

## 2019-10-24 NOTE — PROGRESS NOTES
"Addendum: Patient in ED on 11-9-19 after falling on the ice:     \"X-rays with no acute fractures.     No bruising externally     Will treat with local measures ice/heat.     Sees INR nurse on Monday\"    No medication changes. Patient already has INR appointment schedule for today.    Aparna Kunz RN, BSN, PHN  11-    ANTICOAGULATION FOLLOW-UP CLINIC VISIT    Patient Name:  Antoine Russo  Date:  10/24/2019  Contact Type:  Face to Face    SUBJECTIVE:  Patient Findings     Comments:   No changes in diet, activity level, medications (including over the counter), or health. No changes in alcohol use or his chronic back pain. No missed doses of warfarin. Patient took dosing as prescribed. No signs of clots or bleeding concerns.   Will lower weekly dose by 6.3% due to two supra therapeutic INRs in a row. Recheck again in 2 weeks.           Clinical Outcomes     Negatives:   Major bleeding event, Thromboembolic event, Anticoagulation-related hospital admission, Anticoagulation-related ED visit, Anticoagulation-related fatality    Comments:   No changes in diet, activity level, medications (including over the counter), or health. No changes in alcohol use or his chronic back pain. No missed doses of warfarin. Patient took dosing as prescribed. No signs of clots or bleeding concerns.   Will lower weekly dose by 6.3% due to two supra therapeutic INRs in a row. Recheck again in 2 weeks.              OBJECTIVE    INR Protime   Date Value Ref Range Status   10/24/2019 3.4 (A) 0.86 - 1.14 Final       ASSESSMENT / PLAN  INR assessment SUPRA    Recheck INR In: 2 WEEKS    INR Location Clinic      Anticoagulation Summary  As of 10/24/2019    INR goal:   2.0-3.0   TTR:   62.4 % (1.3 y)   INR used for dosing:   3.4! (10/24/2019)   Warfarin maintenance plan:   3.75 mg (2.5 mg x 1.5) every Mon; 2.5 mg (2.5 mg x 1) all other days   Full warfarin instructions:   3.75 mg every Mon; 2.5 mg all other days   Weekly warfarin " total:   18.75 mg   Plan last modified:   Aparna Kunz RN (10/24/2019)   Next INR check:   11/7/2019   Priority:   INR   Target end date:   Indefinite    Indications    Chronic atrial fibrillation [I48.20]  Long term current use of anticoagulant therapy [Z79.01]             Anticoagulation Episode Summary     INR check location:       Preferred lab:       Send INR reminders to:   [facility]    Comments:   * 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 No bandaid      Anticoagulation Care Providers     Provider Role Specialty Phone number    Katie Martino MD [facility] [phone]            See the Encounter Report to view Anticoagulation Flowsheet and Dosing Calendar (Go to Encounters tab in chart review, and find the Anticoagulation Therapy Visit)        Aparna Kunz RN

## 2019-10-28 ENCOUNTER — TELEPHONE (OUTPATIENT)
Dept: EDUCATION SERVICES | Facility: CLINIC | Age: 74
End: 2019-10-28

## 2019-10-28 DIAGNOSIS — E11.42 TYPE 2 DIABETES MELLITUS WITH DIABETIC POLYNEUROPATHY, WITHOUT LONG-TERM CURRENT USE OF INSULIN (H): Chronic | ICD-10-CM

## 2019-10-28 NOTE — TELEPHONE ENCOUNTER
Diabetes education contact:    And awaaay we go: 10-21 176/176/142, 10-22 162/202/78?  I ate, then took it again, back up to 91. Took 2 glucose tabs and went to bed. 10-23 196/236/109, 10-24 188/224/160, 10-25 172/forgot my meter at home/183, 10-26 156/201/101, 10-27 152/196/123. Can't figure out what caused the low readings on the 22nd. Have a good day....Bookem    Bookem,    One low is not an issue but if we start to see a pattern we will need to drop your lunch insulin some.    INcrease breakfast dose to 22 units and supper to 25 units.  Send numbers again in one week.    Ruthann Alaniz RD on 10/28/2019 at 8:03 AM      Any diabetes medication dose changes were made via the CDE Protocol and Collaborative Practice Agreement with the patient's primary care provider. A copy of this encounter was shared with the provider.

## 2019-11-09 ENCOUNTER — APPOINTMENT (OUTPATIENT)
Dept: GENERAL RADIOLOGY | Facility: CLINIC | Age: 74
End: 2019-11-09
Attending: FAMILY MEDICINE
Payer: COMMERCIAL

## 2019-11-09 ENCOUNTER — HOSPITAL ENCOUNTER (EMERGENCY)
Facility: CLINIC | Age: 74
Discharge: HOME | End: 2019-11-09
Attending: FAMILY MEDICINE | Admitting: FAMILY MEDICINE
Payer: COMMERCIAL

## 2019-11-09 VITALS
SYSTOLIC BLOOD PRESSURE: 156 MMHG | HEART RATE: 82 BPM | BODY MASS INDEX: 30.13 KG/M2 | DIASTOLIC BLOOD PRESSURE: 68 MMHG | TEMPERATURE: 97.8 F | RESPIRATION RATE: 16 BRPM | OXYGEN SATURATION: 94 % | WEIGHT: 210 LBS

## 2019-11-09 DIAGNOSIS — S30.0XXA CONTUSION OF SACRUM, INITIAL ENCOUNTER: ICD-10-CM

## 2019-11-09 DIAGNOSIS — S50.02XA CONTUSION OF LEFT ELBOW, INITIAL ENCOUNTER: ICD-10-CM

## 2019-11-09 DIAGNOSIS — S20.229A CONTUSION OF UPPER BACK, UNSPECIFIED LATERALITY, INITIAL ENCOUNTER: ICD-10-CM

## 2019-11-09 PROCEDURE — 99285 EMERGENCY DEPT VISIT HI MDM: CPT | Mod: 25 | Performed by: FAMILY MEDICINE

## 2019-11-09 PROCEDURE — 72220 X-RAY EXAM SACRUM TAILBONE: CPT

## 2019-11-09 PROCEDURE — 99283 EMERGENCY DEPT VISIT LOW MDM: CPT | Mod: Z6 | Performed by: FAMILY MEDICINE

## 2019-11-09 PROCEDURE — 72072 X-RAY EXAM THORAC SPINE 3VWS: CPT

## 2019-11-09 PROCEDURE — 99285 EMERGENCY DEPT VISIT HI MDM: CPT | Performed by: FAMILY MEDICINE

## 2019-11-09 PROCEDURE — 73060 X-RAY EXAM OF HUMERUS: CPT | Mod: LT

## 2019-11-09 ASSESSMENT — ENCOUNTER SYMPTOMS
WOUND: 0
FACIAL SWELLING: 0
NECK STIFFNESS: 0
LIGHT-HEADEDNESS: 0
WEAKNESS: 0
NECK PAIN: 0
BACK PAIN: 1
SHORTNESS OF BREATH: 0
ARTHRALGIAS: 1

## 2019-11-09 NOTE — ED AVS SNAPSHOT
[facility] Emergency Department  [address] [address]  Phone:  [phone]  Fax:  [phone]                                    Antoine Russo   MRN: [id number]    Department:  [facility] Emergency Department   Date of Visit:  11/9/2019           After Visit Summary Signature Page    I have received my discharge instructions, and my questions have been answered. I have discussed any challenges I see with this plan with the nurse or doctor.    ..........................................................................................................................................  Patient/Patient Representative Signature      ..........................................................................................................................................  Patient Representative Print Name and Relationship to Patient    ..................................................               ................................................  Date                                   Time    ..........................................................................................................................................  Reviewed by Signature/Title    ...................................................              ..............................................  Date                                               Time          22EPIC Rev 08/18

## 2019-11-10 NOTE — ED PROVIDER NOTES
History     Chief Complaint   Patient presents with     Fall     slipped on ice this am. No LOC. On coumadin. 0620 am. Left arm pain, pain in tailbone. Upper back.      HPI  Antoine Russo is a 73 year old male who presents with complaint of pain after a fall.  He states he was going down the steps this morning around 6 AM to take out the dogs, the steps were icy, the dogs took off and he slipped on the stairs on the ice.  He landed on his tailbone and left elbow.  He is complaining of left humeral area pain, sacrum coccyx pain, and some pain in the thoracic spine area.  He denies any neck pain or head injury or loss of consciousness.  He does have a significant past Zac history for lumbar spine disease and is on the list at [facility] to get surgery soon as his A1c gets under better control.  He does take Coumadin for his chronic atrial fibrillation.    Allergies:  Allergies   Allergen Reactions     Nkda [No Known Drug Allergies]        Problem List:    Patient Active Problem List    Diagnosis Date Noted     Anticipated difficulty with intubation 05/23/2017     Priority: High     Class: Chronic     Difficult two hands mask ventilation, intubated multiple times asleep with video laryngoscope. H/o tongue cancer surgery.        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 Please refer if Care Coordination services are needed.    Presenting Problem Signs and Symptoms Treatment Plan   Questions or concerns   during clinic hours   I will call my clinic directly:  [address], [place] [address]  [phone].   Questions or concerns outside clinic hours   I will call the 24 hour nurse line at   [phone] or [address] [place] [address].   Need to schedule an appointment   I will call the 24 hour scheduling team at [phone] or my clinic directly at [phone].   Same day treatment     I will call my clinic first, nurse line if after hours, urgent care and express care if needed.   Clinic care coordination services (regular clinic hours)   I will call a clinic care coordinator directly:     Shelia Emanuel RN [facility]  [phone]    JAY Estes:    [phone]    Or call my clinic at [phone] and ask to speak with care coordination.   Crisis Services: Behavioral or Mental Health  [facility] 24 Hour Phone Line  [phone]    [facility] 24 Hour Crisis Services  [phone]    [facility] Prov[facility] [phone]    [facility]   [phone]     Emergency treatment -- Immediately    [facility] 911                Clostridium difficile enterocolitis 12/18/2012     Priority: Medium     Groin fluid collection 12/15/2012     Priority: Medium     CT 12/12- New (since 5/11) collection measuring at least 2.3 cm in diameter and 6.5 cm in length within the right inguinal canal. 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 Gerson Ravi and aristides at the  in 2012  NAD 2017  CT scan of the neck every 2-3 years.  - Thyroid labs yearly.  - Carotid ultrasound in three to four years to evaluate for stenosis.       Hyperlipidemia LDL goal <100 10/31/2010     Priority: Medium     CAD (coronary artery disease) 05/26/2009     Priority: Medium     Stress testing 2009 showed inferior wall ischemia.  Cath preformed; identified moderate CAD with stenosis of 40-50% in LAD and RCA.  No stents were placed.  Echo in 01/2018 (done while in Afib with rates of 100-110); EF of 55-60%.  No RWMA.       GERD (gastroesophageal reflux disease) 05/26/2009     Priority: Medium     Hypertrophy of breast 09/25/2007     Priority: Medium     PERS HX TOBACCO USE - quit in 11/06 with chantix 03/15/2007     Priority: Medium     Hypertension, benign essential, goal below 140/90 11/07/2005     Priority: Medium     Patient has only fair bp control and with family history of diabetes will all ace if lab indicated also has bph and may op for psa and not digital exam next yr        Pain in joint, shoulder region 11/07/2005     Priority: Medium     Hypertrophy of prostate without urinary obstruction 11/07/2005     Priority: Medium     Problem list name updated by automated process. 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 difficile colitis      Colon polyp      Colon polyp 8/26/2011     Coronary artery disease      Diabetes mellitus (H)      Diverticulitis      Diverticulitis of colon 7/17/2007     Hypertension      Leukocytosis 4/17/2019     Malignant neoplasm (H)       Noninfectious ileitis      Recurrent pancreatitis        Past Surgical History:    Past Surgical History:   Procedure Laterality Date     BREAST SURGERY  2008    right breast mass benign     COLONOSCOPY      multiple polyps removed     COLONOSCOPY  8/24/2011    Procedure:COMBINED COLONOSCOPY, REMOVE TUMOR/POLYP/LESION BY SNARE; Surgeon:MILEY ARBOLEDA; Location:[facility]     COLONOSCOPY  12/17/2012    Procedure: COLONOSCOPY;;  Surgeon: Leon Maurer MD;  [facility]     COLONOSCOPY  12/18/2012    Procedure: COLONOSCOPY;;  Surgeon: Leon Maurer MD;  [facility]     DENERVATION OF SPERMATIC CORD MICROSURGICAL Left 5/23/2017    Procedure: DENERVATION OF SPERMATIC CORD MICROSURGICAL;;  Surgeon: Marcio Aggarwal MD;  [facility]     DISSECTION RADICAL NECK BILATERAL  8/2/2012    Procedure: DISSECTION RADICAL NECK BILATERAL;;  Surgeon: Yung Alvares MD;  [facility]     ENDOSCOPIC RETROGRADE CHOLANGIOPANCREATOGRAM N/A 5/10/2016    Procedure: COMBINED ENDOSCOPIC RETROGRADE CHOLANGIOPANCREATOGRAPHY, PLACE TUBE/STENT;  Surgeon: Yovanny Beasley MD;  [facility]     ENDOSCOPIC RETROGRADE CHOLANGIOPANCREATOGRAM N/A 3/29/2018    Procedure: ENDOSCOPIC RETROGRADE CHOLANGIOPANCREATOGRAM;  Endoscopic Retrograde Cholangiopancreatogram, Endoscopic Ultrasound, Biliary Sphincterotomy, Biliary and Pancreatic Stent Placement;  Surgeon: Yovanny Beasley MD;  [facility]     ENDOSCOPIC ULTRASOUND UPPER GASTROINTESTINAL TRACT (GI) N/A 2/3/2016    Procedure: ENDOSCOPIC ULTRASOUND, ESOPHAGOSCOPY / UPPER GASTROINTESTINAL TRACT (GI);  Surgeon: Grabiel Plata MD;  [facility]     ENDOSCOPIC ULTRASOUND UPPER GASTROINTESTINAL TRACT (GI) N/A 3/29/2018    Procedure: ENDOSCOPIC ULTRASOUND, ESOPHAGOSCOPY / UPPER GASTROINTESTINAL TRACT (GI);;  Surgeon: Grabiel Plata MD;  [facility]     ESOPHAGOSCOPY, GASTROSCOPY, DUODENOSCOPY (EGD), COMBINED N/A 2/3/2016    Procedure:  COMBINED ENDOSCOPIC ULTRASOUND, ESOPHAGOSCOPY, GASTROSCOPY, DUODENOSCOPY (EGD), FINE NEEDLE ASPIRATE/BIOPSY;  Surgeon: Grabiel Plata MD;  [facility]     ESOPHAGOSCOPY, GASTROSCOPY, DUODENOSCOPY (EGD), COMBINED N/A 6/8/2016    Procedure: COMBINED ESOPHAGOSCOPY, GASTROSCOPY, DUODENOSCOPY (EGD), REMOVE FOREIGN BODY;  Surgeon: Yovanny Beasley MD;  [facility]     ESOPHAGOSCOPY, GASTROSCOPY, DUODENOSCOPY (EGD), COMBINED N/A 4/17/2018    Procedure: COMBINED ESOPHAGOSCOPY, GASTROSCOPY, DUODENOSCOPY (EGD), REMOVE FOREIGN BODY;  EGD with stent removal;  Surgeon: Grabiel Plata MD;  [facility]     EXCISE LESION INTRAORAL  6/14/2012    Procedure: EXCISE LESION INTRAORAL;  Wide Local Excision Floor of Mouth, Direct Laryngoscopy, Bilateral Taylor's Marsuplization, Split Thickness Skin Graft from right Thigh  Latex Safe;  Surgeon: Gerson Ravi MD;  [facility]     EXCISE LESION INTRAORAL  8/2/2012    Procedure: EXCISE LESION INTRAORAL;  Floor of Mouth Resection, Bilateral Selective Radical Neck Dissection, Tracheostomy, Left Radial Forearm  Free Flap with Alloderm, Nasogastric Feeding Tube Placement,    * Latex Safe*;  Surgeon: Gerson Ravi MD;  [facility]     EXCISE LESION INTRAORAL  12/11/2012    Procedure: EXCISE LESION INTRAORAL;  takedown of oral flap;  Surgeon: Yung Alvares MD;  [facility]     GRAFT FREE VASCULARIZED (LOCATION)  8/2/2012    Procedure: GRAFT FREE VASCULARIZED (LOCATION);;  Surgeon: Yung Alvares MD;  [facility]     GRAFT SKIN SPLIT THICKNESS FROM EXTREMITY  6/14/2012    Procedure: GRAFT SKIN SPLIT THICKNESS FROM EXTREMITY;;  Surgeon: Gerson Ravi MD;  [facility]     LAPAROSCOPIC ILEOSTOMY TAKEDOWN  6/6/2013    Procedure: LAPAROSCOPIC ILEOSTOMY TAKEDOWN;  Laparoscopic Closure of Enterostomy, Guerda's Type with IleoRectal Anastomosis ;  Surgeon: Grabiel Riddle MD;  [facility]     LAPAROTOMY EXPLORATORY  12/20/2012     "Procedure: LAPAROTOMY EXPLORATORY;  Exploratory Laparotomy, total abdominal colectomy, ileostomy formation;  Surgeon: Miquel Cannon MD;  [facility]     LARYNGOSCOPY  2012    Procedure: LARYNGOSCOPY;;  Surgeon: Gerson Ravi MD;  [facility]     ORTHOPEDIC SURGERY      ganglian cyst left ankle     PANCREATECTOMY, SPLENECTOMY N/A 3/10/2016    Procedure: PANCREATECTOMY, SPLENECTOMY;  Surgeon: Nael Abel MD;  [facility]     SHOULDER SURGERY  ,     2006- right rotator cuff,  bone spur on left. Dr. Hdez     VARICOCELECTOMY Left 2017    Procedure: VARICOCELECTOMY;  Left Varicocele Repair, Denervation of Left Testis;  Surgeon: Marcio Aggarwal MD;  [facility]       Family History:    Family History   Problem Relation Age of Onset     Diabetes Sister         onset age 50     Alzheimer Disease Mother          80     Alzheimer Disease Father          85     Diabetes Other         nephew type 1     Diabetes Other      Aneurysm Sister      Anesthesia Reaction No family hx of      Colon Cancer No family hx of      Colon Polyps No family hx of      Crohn's Disease No family hx of      Ulcerative Colitis No family hx of        Social History:  Marital Status:   [2]  Social History     Tobacco Use     Smoking status: Former Smoker     Packs/day: 1.00     Years: 40.00     Pack years: 40.00     Types: Cigarettes     Last attempt to quit: 2006     Years since quittin.9     Smokeless tobacco: Never Used   Substance Use Topics     Alcohol use: Not Currently     Comment: \"1 beer on Thanksgiving day\"     Drug use: No        Medications:    ASPIRIN NOT PRESCRIBED (INTENTIONAL)  blood glucose (CONTOUR NEXT TEST) test strip  gabapentin (NEURONTIN) 600 MG tablet  insulin degludec (TRESIBA) 200 UNIT/ML pen  Insulin Lispro (HUMALOG KWIKPEN) 200 UNIT/ML soln  insulin pen needle (BD LUIS U/F) 32G X 4 MM miscellaneous  lisinopril (PRINIVIL/ZESTRIL) 5 MG tablet  metoprolol " succinate ER (TOPROL-XL) 50 MG 24 hr tablet  multivitamin, therapeutic with minerals (THERA-VIT-M) TABS  tamsulosin (FLOMAX) 0.4 MG capsule  vitamin B-12 (CYANOCOBALAMIN) 100 MCG tablet  warfarin ANTICOAGULANT (COUMADIN) 2.5 MG tablet          Review of Systems   HENT: Negative for facial swelling and nosebleeds.    Eyes: Negative for visual disturbance.   Respiratory: Negative for shortness of breath.    Cardiovascular: Negative for chest pain.   Musculoskeletal: Positive for arthralgias and back pain. Negative for neck pain and neck stiffness.   Skin: Negative for wound.   Neurological: Negative for weakness and light-headedness.       Physical Exam   BP: (!) 185/82  Pulse: 85  Temp: 97.8  F (36.6  C)  Resp: 16  Weight: 95.3 kg (210 lb)  SpO2: 93 %      Physical Exam  Vitals signs and nursing note reviewed.   Constitutional:       General: He is not in acute distress.     Appearance: Normal appearance. He is normal weight. He is not toxic-appearing or diaphoretic.   HENT:      Head: Normocephalic and atraumatic.      Nose: Nose normal.      Mouth/Throat:      Mouth: Mucous membranes are moist.   Eyes:      Extraocular Movements: Extraocular movements intact.   Neck:      Musculoskeletal: Normal range of motion and neck supple. No neck rigidity or muscular tenderness.   Cardiovascular:      Rate and Rhythm: Rhythm irregular.   Pulmonary:      Effort: Pulmonary effort is normal. No respiratory distress.   Musculoskeletal: Normal range of motion.        Thoracic back: He exhibits tenderness. He exhibits no bony tenderness.      Lumbar back: He exhibits tenderness. He exhibits no deformity.      Left upper arm: He exhibits tenderness. He exhibits no bony tenderness, no swelling and no deformity.   Skin:     General: Skin is warm and dry.   Neurological:      General: No focal deficit present.      Mental Status: He is alert.   Psychiatric:         Mood and Affect: Mood normal.         ED Course        Procedures                Critical Care time:  none               Results for orders placed or performed during the hospital encounter of 11/09/19 (from the past 24 hour(s))   Humerus XR,  G/E 2 views, left    Narrative    LEFT HUMERUS TWO OR MORE VIEWS   11/9/2019 6:41 PM     HISTORY: Fall, pain.    COMPARISON: None.    FINDINGS:  Postop changes of the proximal forearm and multiple  surgical clips projected over the radius and ulna are noted.  Enthesopathic changes are seen at the medial epicondyle indicating  chronic medial epicondylitis. No acute fracture or malalignment  humerus is identified. No anterior or posterior fat pad elevation of  the elbow to suggest occult fracture at the elbow. There is mild  thickening of the soft tissues over the olecranon process which could  represent olecranon bursitis or contusion of the soft tissues in this  region. Otherwise, the overlying soft tissues are grossly  unremarkable.      Impression    IMPRESSION:   1. Postop changes proximal forearm.  2. Question mild olecranon bursitis.  3. Chronic medial epicondylitis.  4. No acute fracture or malalignment.    Thoracic spine XR, 3 views    Narrative    THORACIC SPINE THREE VIEWS  11/9/2019 6:41 PM     HISTORY: Fall, pain.    COMPARISON: None available. Correlation is made with prior chest  radiograph dated 11/25/2018 and MRI of the lumbar spine dated  7/23/2019.      Impression    IMPRESSION: Alignment of the thoracic spine appears within normal  limits. No gross acute vertebral body height loss is appreciated.  There is a background of multilevel degenerative disc disease, which  appears most pronounced in the mid to lower thoracic spine.  Atherosclerotic calcifications of the aortic arch are seen. Surgical  clips project over the right lower neck.    Please note that radiographs are insensitive for the detection of  acute traumatic spine injury; therefore, if there is persistent  concern for occult spine injury, cross-sectional imaging  is  recommended.    XR Sacrum and Coccyx 2 Views    Narrative    SACRUM AND COCCYX TWO VIEWS   11/9/2019 6:42 PM     HISTORY: Fall, pain.    COMPARISON: CT of the abdomen and pelvis dated 5/22/2019, MRI of the  lumbar spine 7/22/2019.      Impression    IMPRESSION: No displaced fracture of the sacrum or coccyx is  identified, although radiographs are somewhat insensitive. Alignment  of the bony pelvis and lower lumbar spine appears within normal  limits. Degenerative changes of both hips and within the lower lumbar  spine. Advanced atherosclerotic calcifications involving the lower  abdominal aorta and iliac arteries. Surgical clips projecting over the  right and left lower abdomen.        Medications - No data to display    Assessments & Plan (with Medical Decision Making)     I have reviewed the nursing notes.    I have reviewed the findings, diagnosis, plan and need for follow up with the patient.      X-rays with no acute fractures.    No bruising externally    Will treat with local measures ice/heat.    Sees INR nurse on Monday        New Prescriptions    No medications on file       Final diagnoses:   Contusion of upper back, unspecified laterality, initial encounter   Contusion of left elbow, initial encounter   Contusion of sacrum, initial encounter       11/9/2019   [facility] EMERGENCY DEPARTMENT     Ovi Bojorquez MD  11/09/19 0008

## 2019-11-11 ENCOUNTER — ANTICOAGULATION THERAPY VISIT (OUTPATIENT)
Dept: ANTICOAGULATION | Facility: CLINIC | Age: 74
End: 2019-11-11
Payer: COMMERCIAL

## 2019-11-11 DIAGNOSIS — Z79.01 LONG TERM CURRENT USE OF ANTICOAGULANT THERAPY: ICD-10-CM

## 2019-11-11 DIAGNOSIS — I48.20 CHRONIC ATRIAL FIBRILLATION (H): ICD-10-CM

## 2019-11-11 LAB — INR POINT OF CARE: 2.7 (ref 0.86–1.14)

## 2019-11-11 PROCEDURE — 85610 PROTHROMBIN TIME: CPT | Mod: QW

## 2019-11-11 PROCEDURE — 99207 ZZC NO CHARGE NURSE ONLY: CPT

## 2019-11-11 PROCEDURE — 36416 COLLJ CAPILLARY BLOOD SPEC: CPT

## 2019-11-11 NOTE — PROGRESS NOTES
ANTICOAGULATION FOLLOW-UP CLINIC VISIT    Patient Name:  Antoine Russo  Date:  2019  Contact Type:  Face to Face    SUBJECTIVE:  Patient Findings     Comments:   No changes in diet, activity level, medications (including over the counter), or health. Patient did fall recently. He has an injured elbow and pelvic area but is not taking anything for pain. There were no fractures. No missed doses of warfarin. Patient took dosing as prescribed. No signs of clots or bleeding concerns. Patient will continue recently reduced maintenance warfarin dosing. Recheck INR when seeing PCP in 4 weeks. Patient will come sooner if needed.          Clinical Outcomes     Negatives:   Major bleeding event, Thromboembolic event, Anticoagulation-related hospital admission, Anticoagulation-related ED visit, Anticoagulation-related fatality    Comments:   No changes in diet, activity level, medications (including over the counter), or health. Patient did fall recently. He has an injured elbow and pelvic area but is not taking anything for pain. There were no fractures. No missed doses of warfarin. Patient took dosing as prescribed. No signs of clots or bleeding concerns. Patient will continue recently reduced maintenance warfarin dosing. Recheck INR when seeing PCP in 4 weeks. Patient will come sooner if needed.             OBJECTIVE    INR Protime   Date Value Ref Range Status   2019 2.7 (A) 0.86 - 1.14 Final       ASSESSMENT / PLAN  INR assessment THER    Recheck INR In: 4 WEEKS    INR Location Clinic      Anticoagulation Summary  As of 2019    INR goal:   2.0-3.0   TTR:   61.7 % (1.4 y)   INR used for dosin.7 (2019)   Warfarin maintenance plan:   3.75 mg (2.5 mg x 1.5) every Mon; 2.5 mg (2.5 mg x 1) all other days   Full warfarin instructions:   3.75 mg every Mon; 2.5 mg all other days   Weekly warfarin total:   18.75 mg   No change documented:   Aparna Kunz RN   Plan last modified:   Joaquina  FRANCHESCA Braun (10/24/2019)   Next INR check:   12/12/2019   Priority:   INR   Target end date:   Indefinite    Indications    Chronic atrial fibrillation [I48.20]  Long term current use of anticoagulant therapy [Z79.01]             Anticoagulation Episode Summary     INR check location:       Preferred lab:       Send INR reminders to:   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 No bandaid      Anticoagulation Care Providers     Provider Role Specialty Phone number    Katie Martino MD [facility] [phone]            See the Encounter Report to view Anticoagulation Flowsheet and Dosing Calendar (Go to Encounters tab in chart review, and find the Anticoagulation Therapy Visit)        Aparna Kunz RN

## 2019-11-14 ENCOUNTER — TELEPHONE (OUTPATIENT)
Dept: EDUCATION SERVICES | Facility: CLINIC | Age: 74
End: 2019-11-14

## 2019-11-14 NOTE — TELEPHONE ENCOUNTER
Diabetes education contact:    Hi, [place]. Here we go:  11-4 173/150/93, 11-5 122/129/72, 11-6 199/182/76, 11-7 159/268 (I know what happened)/133, 11-8 146/126/75, /169/115, 11-10 126/152/88. Seems to be getting better. Surprised that it didn't go higher, Took a fall on the ice on steps. Glad they were wood and not concrete. Went to ER, nothing broken, but I sure am stiff and sore.. Have a good one...Bookem    Having too low of numbers before supper drop novolog to 20 units.    Ruthann Alaniz RD on 11/14/2019 at 4:52 PM    Any diabetes medication dose changes were made via the CDE Protocol and Collaborative Practice Agreement with the patient's primary care provider. A copy of this encounter was shared with the provider.

## 2019-11-17 DIAGNOSIS — R39.15 URGENCY OF URINATION: ICD-10-CM

## 2019-11-17 DIAGNOSIS — N40.0 HYPERTROPHY OF PROSTATE WITHOUT URINARY OBSTRUCTION: ICD-10-CM

## 2019-11-17 NOTE — TELEPHONE ENCOUNTER
"Requested Prescriptions   Pending Prescriptions Disp Refills     tamsulosin (FLOMAX) 0.4 MG capsule [Pharmacy Med Name: TAMSULOSIN 0.4MG    CAP]  Last Written Prescription Date:  08/14/19  Last Fill Quantity: 90,  # refills: 2   Last office visit: 10/17/2019 with prescribing provider:  NILTON Martino   Future Office Visit:   Next 5 appointments (look out 90 days)    Dec 12, 2019  8:40 AM CST  [facility] [address] [place] [id number]  [phone]          90 capsule 2     Sig: TAKE 1 CAPSULE BY MOUTH ONCE DAILY       Alpha Blockers Failed - 11/17/2019  8:26 AM        Failed - Blood pressure under 140/90 in past 12 months     BP Readings from Last 3 Encounters:   11/09/19 (!) 156/68   10/17/19 132/80   10/15/19 (!) 147/67                 Passed - Recent (12 mo) or future (30 days) visit within the authorizing provider's specialty     Patient has had an office visit with the authorizing provider or a provider within the authorizing providers department within the previous 12 mos or has a future within next 30 days. See \"Patient Info\" tab in inbasket, or \"Choose Columns\" in Meds & Orders section of the refill encounter.              Passed - Patient does not have Tadalafil, Vardenafil, or Sildenafil on their medication list        Passed - Medication is active on med list        Passed - Patient is 18 years of age or older          "

## 2019-11-18 RX ORDER — TAMSULOSIN HYDROCHLORIDE 0.4 MG/1
CAPSULE ORAL
Qty: 90 CAPSULE | Refills: 0 | Status: SHIPPED | OUTPATIENT
Start: 2019-11-18 | End: 2020-02-10

## 2019-11-25 DIAGNOSIS — I48.20 CHRONIC ATRIAL FIBRILLATION (H): ICD-10-CM

## 2019-11-25 NOTE — TELEPHONE ENCOUNTER
Reason for Call:  Medication or medication refill:    Do you use a [facility]?  Name of the pharmacy and phone number for the current request:  [facility] - [phone]    Name of the medication requested:    warfarin ANTICOAGULANT (COUMADIN) 2.5 MG tablet  Last Written Prescription Date:  10/24/19  Last Fill Quantity: 100,  # refills: 0   Last office visit: 10/17/2019 with prescribing provider:  Dr. Martino   Future Office Visit:   Next 5 appointments (look out 90 days)    Dec 12, 2019  8:40 AM CST  SHORT with Katie Martino MD  [facility] ([facility]) [address] [place] [phone]  [phone]          Call taken on 11/25/2019 at 3:49 PM by Lauryn Yuen

## 2019-11-25 NOTE — TELEPHONE ENCOUNTER
"Requested Prescriptions   Pending Prescriptions Disp Refills     warfarin ANTICOAGULANT (COUMADIN) 2.5 MG tablet 100 tablet 0     Sig: Take 3.75 mg every Monday; 2.5 mg all other days or as directed by the Anticoagulation Clinic       Vitamin K Antagonists Failed - 11/25/2019  3:50 PM        Failed - INR is within goal in the past 6 weeks     Confirm INR is within goal in the past 6 weeks.     Recent Labs   Lab Test 11/11/19   INR 2.7*                       Passed - Recent (12 mo) or future (30 days) visit within the authorizing provider's specialty     Patient has had an office visit with the authorizing provider or a provider within the authorizing providers department within the previous 12 mos or has a future within next 30 days. See \"Patient Info\" tab in inbasket, or \"Choose Columns\" in Meds & Orders section of the refill encounter.              Passed - Medication is active on med list        Passed - Patient is 18 years of age or older        Trinidad APONTE (R) (M)    "

## 2019-11-26 RX ORDER — WARFARIN SODIUM 2.5 MG/1
TABLET ORAL
Qty: 100 TABLET | Refills: 0 | Status: SHIPPED | OUTPATIENT
Start: 2019-11-26 | End: 2020-01-09

## 2019-11-26 NOTE — TELEPHONE ENCOUNTER
Current warfarin dose:  Full warfarin instructions:   3.75 mg every Mon; 2.5 mg all other days   Weekly warfarin total:   18.75 mg     Last office visit: 10/17/2019    Last INR result:  INR Protime   Date Value Ref Range Status   11/11/2019 2.7 (A) 0.86 - 1.14 Final       Refill authorized per [facility] protocol.    BRYAN Castaneda RN BSN

## 2019-12-03 ENCOUNTER — TELEPHONE (OUTPATIENT)
Dept: EDUCATION SERVICES | Facility: CLINIC | Age: 74
End: 2019-12-03

## 2019-12-03 DIAGNOSIS — E11.42 TYPE 2 DIABETES MELLITUS WITH DIABETIC POLYNEUROPATHY, WITHOUT LONG-TERM CURRENT USE OF INSULIN (H): Chronic | ICD-10-CM

## 2019-12-03 NOTE — TELEPHONE ENCOUNTER
Diabetes education contact:    11-25 175/166/92, 11-26 150/160/72, 11-27 200/204/110, 11-28 164/148/17510-71 325 (Had pizza last nite)/280/82, 11-30/268 (forgot morning injection)/125, /193/125. Guess I can't eat pizza anymore (one of my favorite food groups....Bookem    Increase the breakfast NOvolog to 24 units daily.  Increase the Tresiba to 78 units daily.    Ruthann Alaniz RD on 12/3/2019 at 9:07 AM    Any diabetes medication dose changes were made via the CDE Protocol and Collaborative Practice Agreement with the patient's primary care provider. A copy of this encounter was shared with the provider.

## 2019-12-06 ENCOUNTER — OFFICE VISIT (OUTPATIENT)
Dept: DERMATOLOGY | Facility: CLINIC | Age: 74
End: 2019-12-06
Attending: FAMILY MEDICINE
Payer: COMMERCIAL

## 2019-12-06 VITALS — DIASTOLIC BLOOD PRESSURE: 72 MMHG | HEART RATE: 81 BPM | OXYGEN SATURATION: 99 % | SYSTOLIC BLOOD PRESSURE: 157 MMHG

## 2019-12-06 DIAGNOSIS — L82.0 INFLAMED SEBORRHEIC KERATOSIS: Primary | ICD-10-CM

## 2019-12-06 DIAGNOSIS — L81.4 LENTIGO: ICD-10-CM

## 2019-12-06 DIAGNOSIS — L82.1 SEBORRHEIC KERATOSIS: ICD-10-CM

## 2019-12-06 DIAGNOSIS — D18.01 CHERRY ANGIOMA: ICD-10-CM

## 2019-12-06 PROCEDURE — 99213 OFFICE O/P EST LOW 20 MIN: CPT | Mod: 25 | Performed by: PHYSICIAN ASSISTANT

## 2019-12-06 PROCEDURE — 17110 DESTRUCTION B9 LES UP TO 14: CPT | Performed by: PHYSICIAN ASSISTANT

## 2019-12-06 NOTE — NURSING NOTE
"Chief Complaint   Patient presents with     Skin Check       Initial BP (!) 157/72 (BP Location: Right arm, Patient Position: Chair, Cuff Size: Adult Regular)   Pulse 81   SpO2 99%  Estimated body mass index is 30.13 kg/m  as calculated from the following:    Height as of 10/17/19: 1.778 m (5' 10\").    Weight as of 11/9/19: 95.3 kg (210 lb).  Medications and allergies reviewed.    Veronica DO CMA ([facility])    "

## 2019-12-06 NOTE — LETTER
12/6/2019         RE: Antoine Russo  [address] [id number]        Dear Colleague,    Thank you for referring your patient, Antoine Russo, to the [facility]. Please see a copy of my visit note below.    Antoine Russo is a 73 year old year old male patient here today for spots on face.  Patient states this has been present for few years. He notes spot on cheek is growing larger. Denies any pain or bleeding. Patient has no other skin complaints today.  Remainder of the HPI, Meds, PMH, Allergies, FH, and SH was reviewed in chart.    Pertinent Hx:   No personal history of skin cancer.   Past Medical History:   Diagnosis Date     Acute kidney injury (H) 4/17/2019     Acute on chronic pancreatitis (H) 1/23/2018     Bacteriuria with pyuria 4/17/2019     C. difficile colitis      Colon polyp      Colon polyp 8/26/2011    Colonoscopy 8/2011-A sessile polyp was found in the cecum. The polyp was 6 mm in size. The polyp was removed with a hot snare. Resection and retrieval were complete. A sessile polyp was found in the proximal transverse colon. The polyp was 15 mm in size. The polyp was removed with a hot snare. Resection and retrieval were complete. A sessile polyp was found in the sigmoid colon. The polyp was 5 mm     Coronary artery disease      Diabetes mellitus (H)     type 2     Diverticulitis      Diverticulitis of colon 7/17/2007    Colitis on CT Scan 5/2011- [place] [facility] 8/[place] Diverticulitis - Multiple small and large-mouthed diverticula were found in the mid sigmoid colon and at the hepatic flexure. There was narrowing of the colon in association with the diverticular opening. Nena-diverticular erythema was seen. There was evidence of an impacted diverticulum. 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     Noninfectious ileitis       Recurrent pancreatitis        Past Surgical History:   Procedure Laterality Date     BREAST SURGERY  2008    right breast mass benign     COLONOSCOPY      multiple polyps removed     COLONOSCOPY  8/24/2011    Procedure:COMBINED COLONOSCOPY, REMOVE TUMOR/POLYP/LESION BY SNARE; Surgeon:MILEY ARBOLEDA; Location:[facility]     COLONOSCOPY  12/17/2012    Procedure: COLONOSCOPY;;  Surgeon: Leon Maurer MD;  [facility]     COLONOSCOPY  12/18/2012    Procedure: COLONOSCOPY;;  Surgeon: Leon Maurer MD;  [facility]     DENERVATION OF SPERMATIC CORD MICROSURGICAL Left 5/23/2017    Procedure: DENERVATION OF SPERMATIC CORD MICROSURGICAL;;  Surgeon: Marcio Aggarwal MD;  [facility]     DISSECTION RADICAL NECK BILATERAL  8/2/2012    Procedure: DISSECTION RADICAL NECK BILATERAL;;  Surgeon: Yung Alvares MD;  [facility]     ENDOSCOPIC RETROGRADE CHOLANGIOPANCREATOGRAM N/A 5/10/2016    Procedure: COMBINED ENDOSCOPIC RETROGRADE CHOLANGIOPANCREATOGRAPHY, PLACE TUBE/STENT;  Surgeon: Yovanny Beasley MD;  [facility]     ENDOSCOPIC RETROGRADE CHOLANGIOPANCREATOGRAM N/A 3/29/2018    Procedure: ENDOSCOPIC RETROGRADE CHOLANGIOPANCREATOGRAM;  Endoscopic Retrograde Cholangiopancreatogram, Endoscopic Ultrasound, Biliary Sphincterotomy, Biliary and Pancreatic Stent Placement;  Surgeon: Yovanny Beasley MD;  [facility]     ENDOSCOPIC ULTRASOUND UPPER GASTROINTESTINAL TRACT (GI) N/A 2/3/2016    Procedure: ENDOSCOPIC ULTRASOUND, ESOPHAGOSCOPY / UPPER GASTROINTESTINAL TRACT (GI);  Surgeon: Grabiel Plata MD;  [facility]     ENDOSCOPIC ULTRASOUND UPPER GASTROINTESTINAL TRACT (GI) N/A 3/29/2018    Procedure: ENDOSCOPIC ULTRASOUND, ESOPHAGOSCOPY / UPPER GASTROINTESTINAL TRACT (GI);;  Surgeon: Grabiel Plata MD;  [facility]     ESOPHAGOSCOPY, GASTROSCOPY, DUODENOSCOPY (EGD), COMBINED N/A 2/3/2016    Procedure: COMBINED ENDOSCOPIC ULTRASOUND, ESOPHAGOSCOPY,  GASTROSCOPY, DUODENOSCOPY (EGD), FINE NEEDLE ASPIRATE/BIOPSY;  Surgeon: Grabiel Plata MD;  [facility]     ESOPHAGOSCOPY, GASTROSCOPY, DUODENOSCOPY (EGD), COMBINED N/A 6/8/2016    Procedure: COMBINED ESOPHAGOSCOPY, GASTROSCOPY, DUODENOSCOPY (EGD), REMOVE FOREIGN BODY;  Surgeon: Yovanny Beasley MD;  [facility]     ESOPHAGOSCOPY, GASTROSCOPY, DUODENOSCOPY (EGD), COMBINED N/A 4/17/2018    Procedure: COMBINED ESOPHAGOSCOPY, GASTROSCOPY, DUODENOSCOPY (EGD), REMOVE FOREIGN BODY;  EGD with stent removal;  Surgeon: Grabiel Plata MD;  [facility]     EXCISE LESION INTRAORAL  6/14/2012    Procedure: EXCISE LESION INTRAORAL;  Wide Local Excision Floor of Mouth, Direct Laryngoscopy, Bilateral Ida's Marsuplization, Split Thickness Skin Graft from right Thigh  Latex Safe;  Surgeon: Gerson Ravi MD;  [facility]     EXCISE LESION INTRAORAL  8/2/2012    Procedure: EXCISE LESION INTRAORAL;  Floor of Mouth Resection, Bilateral Selective Radical Neck Dissection, Tracheostomy, Left Radial Forearm  Free Flap with Alloderm, Nasogastric Feeding Tube Placement,    * Latex Safe*;  Surgeon: Gerson Ravi MD;  [facility]     EXCISE LESION INTRAORAL  12/11/2012    Procedure: EXCISE LESION INTRAORAL;  takedown of oral flap;  Surgeon: Yung Alvares MD;  [facility]     GRAFT FREE VASCULARIZED (LOCATION)  8/2/2012    Procedure: GRAFT FREE VASCULARIZED (LOCATION);;  Surgeon: Yung Alvares MD;  [facility]     GRAFT SKIN SPLIT THICKNESS FROM EXTREMITY  6/14/2012    Procedure: GRAFT SKIN SPLIT THICKNESS FROM EXTREMITY;;  Surgeon: Gerson Ravi MD;  [facility]     LAPAROSCOPIC ILEOSTOMY TAKEDOWN  6/6/2013    Procedure: LAPAROSCOPIC ILEOSTOMY TAKEDOWN;  Laparoscopic Closure of Enterostomy, Guerda's Type with IleoRectal Anastomosis ;  Surgeon: Grabiel Riddle MD;  [facility]     LAPAROTOMY EXPLORATORY  12/20/2012    Procedure: LAPAROTOMY EXPLORATORY;  Exploratory  Laparotomy, total abdominal colectomy, ileostomy formation;  Surgeon: Miquel Cannon MD;  [facility]     LARYNGOSCOPY  2012    Procedure: LARYNGOSCOPY;;  Surgeon: Gerson Ravi MD;  [facility]     ORTHOPEDIC SURGERY      ganglian cyst left ankle     PANCREATECTOMY, SPLENECTOMY N/A 3/10/2016    Procedure: PANCREATECTOMY, SPLENECTOMY;  Surgeon: Nael Abel MD;  [facility]     SHOULDER SURGERY  ,     2006- right rotator cuff,  bone spur on left. Dr. Hdez     VARICOCELECTOMY Left 2017    Procedure: VARICOCELECTOMY;  Left Varicocele Repair, Denervation of Left Testis;  Surgeon: Marcio Aggarwal MD;  [facility]        Family History   Problem Relation Age of Onset     Diabetes Sister         onset age 50     Alzheimer Disease Mother          80     Alzheimer Disease Father          85     Diabetes Other         nephew type 1     Diabetes Other      Aneurysm Sister      Anesthesia Reaction No family hx of      Colon Cancer No family hx of      Colon Polyps No family hx of      Crohn's Disease No family hx of      Ulcerative Colitis No family hx of        Social History     Socioeconomic History     Marital status:      Spouse name: Not on file     Number of children: Not on file     Years of education: Not on file     Highest education level: Not on file   Occupational History     Occupation: [employer]     Comment: 20 hr/week monday-Thur 7-noon     Occupation: [place]      Employer: [employer]     Occupation: RETIRED   Social Needs     Financial resource strain: Not on file     Food insecurity:     Worry: Not on file     Inability: Not on file     Transportation needs:     Medical: Not on file     Non-medical: Not on file   Tobacco Use     Smoking status: Former Smoker     Packs/day: 1.00     Years: 40.00     Pack years: 40.00     Types: Cigarettes     Last attempt to quit: 2006     Years since quittin.0  "    Smokeless tobacco: Never Used   Substance and Sexual Activity     Alcohol use: Not Currently     Comment: \"1 beer on Thanksgiving day\"     Drug use: No     Sexual activity: Yes     Partners: Female   Lifestyle     Physical activity:     Days per week: Not on file     Minutes per session: Not on file     Stress: Not on file   Relationships     Social connections:     Talks on phone: Not on file     Gets together: Not on file     Attends Jain service: Not on file     Active member of club or organization: Not on file     Attends meetings of clubs or organizations: Not on file     Relationship status: Not on file     Intimate partner violence:     Fear of current or ex partner: Not on file     Emotionally abused: Not on file     Physically abused: Not on file     Forced sexual activity: Not on file   Other Topics Concern      Service Yes     Comment: [employer] 6 years     Blood Transfusions No     Caffeine Concern Yes     Comment: 0-2 cups     Occupational Exposure No     Comment: retired     Hobby Hazards No     Sleep Concern No     Stress Concern No     Weight Concern No     Special Diet Yes     Comment: healthy     Back Care No     Exercise Yes     Bike Helmet Not Asked     Comment: N/A     Seat Belt Yes     Self-Exams Yes     Parent/sibling w/ CABG, MI or angioplasty before 65F 55M? No   Social History Narrative    Son lives in [place] with 2 grandchildren 19 and 6       Outpatient Encounter Medications as of 12/6/2019   Medication Sig Dispense Refill     ASPIRIN NOT PRESCRIBED (INTENTIONAL) Antiplatelet medication not prescribed intentionally due to Current anticoagulant therapy (warfarin/enoxaparin)       blood glucose (CONTOUR NEXT TEST) test strip Use to test blood sugar 2 times daily or as directed. 100 each 11     gabapentin (NEURONTIN) 600 MG tablet Take 1 tablet (600 mg) by mouth 3 times daily Along with 300mg for a total of 900mg for bedtime dose 270 tablet 2     insulin degludec (TRESIBA) " 200 UNIT/ML pen Take 78 units at bedtime (increase by 2 units every 4 days until am readings are under 120) max dose: 84. 18 mL 3     Insulin Lispro (HUMALOG KWIKPEN) 200 UNIT/ML soln Take 24 units with breakfast, 20 lunch, take 25 units with supper. All plus the high sliding scale.  Max dose: 105 units 30 mL 3     insulin pen needle (BD LUIS U/F) 32G X 4 MM miscellaneous Use 3 daily as directed. 100 each 11     lisinopril (PRINIVIL/ZESTRIL) 5 MG tablet Take 1 tablet (5 mg) by mouth daily 90 tablet 3     metoprolol succinate ER (TOPROL-XL) 50 MG 24 hr tablet TAKE 1 & 1/2 (ONE & ONE-HALF) TABLETS BY MOUTH TWICE DAILY 270 tablet 3     multivitamin, therapeutic with minerals (THERA-VIT-M) TABS Take 1 tablet by mouth daily. 30 each 1     tamsulosin (FLOMAX) 0.4 MG capsule TAKE 1 CAPSULE BY MOUTH ONCE DAILY 90 capsule 0     vitamin B-12 (CYANOCOBALAMIN) 100 MCG tablet Take 100 mcg by mouth daily       warfarin ANTICOAGULANT (COUMADIN) 2.5 MG tablet Take 3.75 mg every Monday; 2.5 mg all other days or as directed by the Anticoagulation Clinic 100 tablet 0     No facility-administered encounter medications on file as of 12/6/2019.              Review Of Systems  Skin: As above  Eyes: negative  Ears/Nose/Throat: negative  Respiratory: No shortness of breath, dyspnea on exertion, cough, or hemoptysis  Cardiovascular: negative  Gastrointestinal: negative  Genitourinary: negative  Musculoskeletal: negative  Neurologic: negative  Psychiatric: negative  Hematologic/Lymphatic/Immunologic: negative  Endocrine: negative      O:   NAD, WDWN, Alert & Oriented, Mood & Affect wnl, Vitals stable   Here today alone   BP (!) 157/72 (BP Location: Right arm, Patient Position: Chair, Cuff Size: Adult Regular)   Pulse 81   SpO2 99%    General appearance normal   Vitals stable   Alert, oriented and in no acute distress   Brown stuck on papules on face  Stuck on papules and brown macules on trunk and ext   Red papules on trunk   The remainder  of expanded problem focused exam was unremarkable; the following areas were examined:  scalp/hair, conjunctiva/lids, face, neck, lips/teeth, chest, digits/nails, RUE, LUE.      Eyes: Conjunctivae/lids:Normal     ENT: Lips: normal    MSK:Normal    Pulm: Breathing Normal    Neuro/Psych: Orientation:Alert and Orientedx3 ; Mood/Affect:normal   A/P:  1. Inflamed seborrheic keratosis on left cheek   LN2:  Treated with LN2 for 5s for 1-2 cycles. Warned risks of blistering, pain, pigment change, scarring, and incomplete resolution.  Advised patient to return if lesions do not completely resolve.  Wound care sheet given.  2. Seborrheic keratosis, lentigo, angioma  BENIGN LESIONS DISCUSSED WITH PATIENT:  I discussed the specifics of tumor, prognosis, and genetics of benign lesions.  I explained that treatment of these lesions would be purely cosmetic and not medically neccessary.  I discussed with patient different removal options including excision, cautery and /or laser.      Nature and genetics of benign skin lesions dicussed with patient.  Signs and Symptoms of skin cancer discussed with patient.  ABCDEs of melanoma reviewed with patient.  Patient encouraged to perform monthly skin exams.  UV precautions reviewed with patient.  Risks of non-melanoma skin cancer discussed with patient   Return to clinic as needed.   Poli or Bookem to follow up with Primary Care provider regarding elevated blood pressure.      Again, thank you for allowing me to participate in the care of your patient.        Sincerely,        Emani Falk PA-C

## 2019-12-09 NOTE — PROGRESS NOTES
Antoine Russo is a 73 year old year old male patient here today for spots on face.  Patient states this has been present for few years. He notes spot on cheek is growing larger. Denies any pain or bleeding. Patient has no other skin complaints today.  Remainder of the HPI, Meds, PMH, Allergies, FH, and SH was reviewed in chart.    Pertinent Hx:   No personal history of skin cancer.   Past Medical History:   Diagnosis Date     Acute kidney injury (H) 4/17/2019     Acute on chronic pancreatitis (H) 1/23/2018     Bacteriuria with pyuria 4/17/2019     C. difficile colitis      Colon polyp      Colon polyp 8/26/2011    Colonoscopy 8/2011-A sessile polyp was found in the cecum. The polyp was 6 mm in size. The polyp was removed with a hot snare. Resection and retrieval were complete. A sessile polyp was found in the proximal transverse colon. The polyp was 15 mm in size. The polyp was removed with a hot snare. Resection and retrieval were complete. A sessile polyp was found in the sigmoid colon. The polyp was 5 mm     Coronary artery disease      Diabetes mellitus (H)     type 2     Diverticulitis      Diverticulitis of colon 7/17/2007    Colitis on CT Scan 5/2011- [facility] 8/2011 Diverticulitis - Multiple small and large-mouthed diverticula were found in the mid sigmoid colon and at the hepatic flexure. There was narrowing of the colon in association with the diverticular opening. Nena-diverticular erythema was seen. There was evidence of an impacted diverticulum. 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     Noninfectious ileitis      Recurrent pancreatitis        Past Surgical History:   Procedure Laterality Date     BREAST SURGERY  2008    right breast mass benign     COLONOSCOPY      multiple polyps removed     COLONOSCOPY  8/24/2011    Procedure:COMBINED COLONOSCOPY, REMOVE  TUMOR/POLYP/LESION BY SNARE; Surgeon:MILEY ARBOLEDA; Location:[facility]     COLONOSCOPY  12/17/2012    Procedure: COLONOSCOPY;;  Surgeon: Leon Maurer MD;  [facility]     COLONOSCOPY  12/18/2012    Procedure: COLONOSCOPY;;  Surgeon: Leon Maurer MD;  [facility]     DENERVATION OF SPERMATIC CORD MICROSURGICAL Left 5/23/2017    Procedure: DENERVATION OF SPERMATIC CORD MICROSURGICAL;;  Surgeon: Marcio Aggarwal MD;  [facility]     DISSECTION RADICAL NECK BILATERAL  8/2/2012    Procedure: DISSECTION RADICAL NECK BILATERAL;;  Surgeon: Yung Alvares MD;  [facility]     ENDOSCOPIC RETROGRADE CHOLANGIOPANCREATOGRAM N/A 5/10/2016    Procedure: COMBINED ENDOSCOPIC RETROGRADE CHOLANGIOPANCREATOGRAPHY, PLACE TUBE/STENT;  Surgeon: Yovanny Beasley MD;  [facility]     ENDOSCOPIC RETROGRADE CHOLANGIOPANCREATOGRAM N/A 3/29/2018    Procedure: ENDOSCOPIC RETROGRADE CHOLANGIOPANCREATOGRAM;  Endoscopic Retrograde Cholangiopancreatogram, Endoscopic Ultrasound, Biliary Sphincterotomy, Biliary and Pancreatic Stent Placement;  Surgeon: Yovanny Beasley MD;  [facility]     ENDOSCOPIC ULTRASOUND UPPER GASTROINTESTINAL TRACT (GI) N/A 2/3/2016    Procedure: ENDOSCOPIC ULTRASOUND, ESOPHAGOSCOPY / UPPER GASTROINTESTINAL TRACT (GI);  Surgeon: Grabiel Plata MD;  [facility]     ENDOSCOPIC ULTRASOUND UPPER GASTROINTESTINAL TRACT (GI) N/A 3/29/2018    Procedure: ENDOSCOPIC ULTRASOUND, ESOPHAGOSCOPY / UPPER GASTROINTESTINAL TRACT (GI);;  Surgeon: Grabiel Plata MD;  [facility]     ESOPHAGOSCOPY, GASTROSCOPY, DUODENOSCOPY (EGD), COMBINED N/A 2/3/2016    Procedure: COMBINED ENDOSCOPIC ULTRASOUND, ESOPHAGOSCOPY, GASTROSCOPY, DUODENOSCOPY (EGD), FINE NEEDLE ASPIRATE/BIOPSY;  Surgeon: Grabiel Plata MD;  [facility]     ESOPHAGOSCOPY, GASTROSCOPY, DUODENOSCOPY (EGD), COMBINED N/A 6/8/2016    Procedure: COMBINED ESOPHAGOSCOPY, GASTROSCOPY, DUODENOSCOPY  (EGD), REMOVE FOREIGN BODY;  Surgeon: Yovanny Beasley MD;  [facility]     ESOPHAGOSCOPY, GASTROSCOPY, DUODENOSCOPY (EGD), COMBINED N/A 4/17/2018    Procedure: COMBINED ESOPHAGOSCOPY, GASTROSCOPY, DUODENOSCOPY (EGD), REMOVE FOREIGN BODY;  EGD with stent removal;  Surgeon: Grabiel Plata MD;  [facility]     EXCISE LESION INTRAORAL  6/14/2012    Procedure: EXCISE LESION INTRAORAL;  Wide Local Excision Floor of Mouth, Direct Laryngoscopy, Bilateral Wheeler's Marsuplization, Split Thickness Skin Graft from right Thigh  Latex Safe;  Surgeon: Gerson Ravi MD;  [facility]     EXCISE LESION INTRAORAL  8/2/2012    Procedure: EXCISE LESION INTRAORAL;  Floor of Mouth Resection, Bilateral Selective Radical Neck Dissection, Tracheostomy, Left Radial Forearm  Free Flap with Alloderm, Nasogastric Feeding Tube Placement,    * Latex Safe*;  Surgeon: Gerson Ravi MD;  [facility]     EXCISE LESION INTRAORAL  12/11/2012    Procedure: EXCISE LESION INTRAORAL;  takedown of oral flap;  Surgeon: Yung Alvares MD;  [facility]     GRAFT FREE VASCULARIZED (LOCATION)  8/2/2012    Procedure: GRAFT FREE VASCULARIZED (LOCATION);;  Surgeon: Yung Alvares MD;  [facility]     GRAFT SKIN SPLIT THICKNESS FROM EXTREMITY  6/14/2012    Procedure: GRAFT SKIN SPLIT THICKNESS FROM EXTREMITY;;  Surgeon: Gerson Ravi MD;  [facility]     LAPAROSCOPIC ILEOSTOMY TAKEDOWN  6/6/2013    Procedure: LAPAROSCOPIC ILEOSTOMY TAKEDOWN;  Laparoscopic Closure of Enterostomy, Guerda's Type with IleoRectal Anastomosis ;  Surgeon: Grabiel Riddle MD;  [facility]     LAPAROTOMY EXPLORATORY  12/20/2012    Procedure: LAPAROTOMY EXPLORATORY;  Exploratory Laparotomy, total abdominal colectomy, ileostomy formation;  Surgeon: Miquel Cannon MD;  [facility]     LARYNGOSCOPY  6/14/2012    Procedure: LARYNGOSCOPY;;  Surgeon: Gerson Ravi MD;  [facility]     ORTHOPEDIC SURGERY      ganglian cyst  "left ankle     PANCREATECTOMY, SPLENECTOMY N/A 3/10/2016    Procedure: PANCREATECTOMY, SPLENECTOMY;  Surgeon: Nael Abel MD;  [facility]     SHOULDER SURGERY  ,     2006- right rotator cuff,  bone spur on left. Dr. Hdez     VARICOCELECTOMY Left 2017    Procedure: VARICOCELECTOMY;  Left Varicocele Repair, Denervation of Left Testis;  Surgeon: Marcio Aggarwal MD;  [facility]        Family History   Problem Relation Age of Onset     Diabetes Sister         onset age 50     Alzheimer Disease Mother          80     Alzheimer Disease Father          85     Diabetes Other         nephew type 1     Diabetes Other      Aneurysm Sister      Anesthesia Reaction No family hx of      Colon Cancer No family hx of      Colon Polyps No family hx of      Crohn's Disease No family hx of      Ulcerative Colitis No family hx of        Social History     Socioeconomic History     Marital status:      Spouse name: Not on file     Number of children: Not on file     Years of education: Not on file     Highest education level: Not on file   Occupational History     Occupation: [employer]     Comment: 20 hr/week monday-Thur 7-noon     Occupation: [employer]      Employer: [employer]     Occupation: RETIRED   Social Needs     Financial resource strain: Not on file     Food insecurity:     Worry: Not on file     Inability: Not on file     Transportation needs:     Medical: Not on file     Non-medical: Not on file   Tobacco Use     Smoking status: Former Smoker     Packs/day: 1.00     Years: 40.00     Pack years: 40.00     Types: Cigarettes     Last attempt to quit: 2006     Years since quittin.0     Smokeless tobacco: Never Used   Substance and Sexual Activity     Alcohol use: Not Currently     Comment: \"1 beer on Thanksgiving day\"     Drug use: No     Sexual activity: Yes     Partners: Female   Lifestyle     Physical activity:     Days per week: " Not on file     Minutes per session: Not on file     Stress: Not on file   Relationships     Social connections:     Talks on phone: Not on file     Gets together: Not on file     Attends Caodaism service: Not on file     Active member of club or organization: Not on file     Attends meetings of clubs or organizations: Not on file     Relationship status: Not on file     Intimate partner violence:     Fear of current or ex partner: Not on file     Emotionally abused: Not on file     Physically abused: Not on file     Forced sexual activity: Not on file   Other Topics Concern      Service Yes     Comment: [employer] 6 years     Blood Transfusions No     Caffeine Concern Yes     Comment: 0-2 cups     Occupational Exposure No     Comment: retired     Hobby Hazards No     Sleep Concern No     Stress Concern No     Weight Concern No     Special Diet Yes     Comment: healthy     Back Care No     Exercise Yes     Bike Helmet Not Asked     Comment: N/A     Seat Belt Yes     Self-Exams Yes     Parent/sibling w/ CABG, MI or angioplasty before 65F 55M? No   Social History Narrative    Son lives in [place] with 2 grandchildren 19 and 6       Outpatient Encounter Medications as of 12/6/2019   Medication Sig Dispense Refill     ASPIRIN NOT PRESCRIBED (INTENTIONAL) Antiplatelet medication not prescribed intentionally due to Current anticoagulant therapy (warfarin/enoxaparin)       blood glucose (CONTOUR NEXT TEST) test strip Use to test blood sugar 2 times daily or as directed. 100 each 11     gabapentin (NEURONTIN) 600 MG tablet Take 1 tablet (600 mg) by mouth 3 times daily Along with 300mg for a total of 900mg for bedtime dose 270 tablet 2     insulin degludec (TRESIBA) 200 UNIT/ML pen Take 78 units at bedtime (increase by 2 units every 4 days until am readings are under 120) max dose: 84. 18 mL 3     Insulin Lispro (HUMALOG KWIKPEN) 200 UNIT/ML soln Take 24 units with breakfast, 20 lunch, take 25 units with supper. All  plus the high sliding scale.  Max dose: 105 units 30 mL 3     insulin pen needle (BD LUIS U/F) 32G X 4 MM miscellaneous Use 3 daily as directed. 100 each 11     lisinopril (PRINIVIL/ZESTRIL) 5 MG tablet Take 1 tablet (5 mg) by mouth daily 90 tablet 3     metoprolol succinate ER (TOPROL-XL) 50 MG 24 hr tablet TAKE 1 & 1/2 (ONE & ONE-HALF) TABLETS BY MOUTH TWICE DAILY 270 tablet 3     multivitamin, therapeutic with minerals (THERA-VIT-M) TABS Take 1 tablet by mouth daily. 30 each 1     tamsulosin (FLOMAX) 0.4 MG capsule TAKE 1 CAPSULE BY MOUTH ONCE DAILY 90 capsule 0     vitamin B-12 (CYANOCOBALAMIN) 100 MCG tablet Take 100 mcg by mouth daily       warfarin ANTICOAGULANT (COUMADIN) 2.5 MG tablet Take 3.75 mg every Monday; 2.5 mg all other days or as directed by the Anticoagulation Clinic 100 tablet 0     No facility-administered encounter medications on file as of 12/6/2019.              Review Of Systems  Skin: As above  Eyes: negative  Ears/Nose/Throat: negative  Respiratory: No shortness of breath, dyspnea on exertion, cough, or hemoptysis  Cardiovascular: negative  Gastrointestinal: negative  Genitourinary: negative  Musculoskeletal: negative  Neurologic: negative  Psychiatric: negative  Hematologic/Lymphatic/Immunologic: negative  Endocrine: negative      O:   NAD, WDWN, Alert & Oriented, Mood & Affect wnl, Vitals stable   Here today alone   BP (!) 157/72 (BP Location: Right arm, Patient Position: Chair, Cuff Size: Adult Regular)   Pulse 81   SpO2 99%    General appearance normal   Vitals stable   Alert, oriented and in no acute distress   Brown stuck on papules on face  Stuck on papules and brown macules on trunk and ext   Red papules on trunk   The remainder of expanded problem focused exam was unremarkable; the following areas were examined:  scalp/hair, conjunctiva/lids, face, neck, lips/teeth, chest, digits/nails, RUE, LUE.      Eyes: Conjunctivae/lids:Normal     ENT: Lips: normal    MSK:Normal    Pulm:  Breathing Normal    Neuro/Psych: Orientation:Alert and Orientedx3 ; Mood/Affect:normal   A/P:  1. Inflamed seborrheic keratosis on left cheek   LN2:  Treated with LN2 for 5s for 1-2 cycles. Warned risks of blistering, pain, pigment change, scarring, and incomplete resolution.  Advised patient to return if lesions do not completely resolve.  Wound care sheet given.  2. Seborrheic keratosis, lentigo, angioma  BENIGN LESIONS DISCUSSED WITH PATIENT:  I discussed the specifics of tumor, prognosis, and genetics of benign lesions.  I explained that treatment of these lesions would be purely cosmetic and not medically neccessary.  I discussed with patient different removal options including excision, cautery and /or laser.      Nature and genetics of benign skin lesions dicussed with patient.  Signs and Symptoms of skin cancer discussed with patient.  ABCDEs of melanoma reviewed with patient.  Patient encouraged to perform monthly skin exams.  UV precautions reviewed with patient.  Risks of non-melanoma skin cancer discussed with patient   Return to clinic as needed.   Poli or Bookem to follow up with Primary Care provider regarding elevated blood pressure.

## 2019-12-12 ENCOUNTER — OFFICE VISIT (OUTPATIENT)
Dept: FAMILY MEDICINE | Facility: CLINIC | Age: 74
End: 2019-12-12
Payer: COMMERCIAL

## 2019-12-12 ENCOUNTER — ANCILLARY PROCEDURE (OUTPATIENT)
Dept: GENERAL RADIOLOGY | Facility: CLINIC | Age: 74
End: 2019-12-12
Attending: FAMILY MEDICINE
Payer: COMMERCIAL

## 2019-12-12 ENCOUNTER — ANTICOAGULATION THERAPY VISIT (OUTPATIENT)
Dept: ANTICOAGULATION | Facility: CLINIC | Age: 74
End: 2019-12-12
Payer: COMMERCIAL

## 2019-12-12 VITALS
SYSTOLIC BLOOD PRESSURE: 128 MMHG | BODY MASS INDEX: 31.28 KG/M2 | OXYGEN SATURATION: 94 % | HEART RATE: 74 BPM | TEMPERATURE: 97.5 F | DIASTOLIC BLOOD PRESSURE: 80 MMHG | WEIGHT: 218 LBS

## 2019-12-12 DIAGNOSIS — Z79.01 LONG TERM CURRENT USE OF ANTICOAGULANT THERAPY: ICD-10-CM

## 2019-12-12 DIAGNOSIS — M25.511 ACUTE PAIN OF BOTH SHOULDERS: ICD-10-CM

## 2019-12-12 DIAGNOSIS — M25.512 ACUTE PAIN OF BOTH SHOULDERS: ICD-10-CM

## 2019-12-12 DIAGNOSIS — E78.5 HYPERLIPIDEMIA LDL GOAL <100: ICD-10-CM

## 2019-12-12 DIAGNOSIS — M48.061 SPINAL STENOSIS OF LUMBAR REGION, UNSPECIFIED WHETHER NEUROGENIC CLAUDICATION PRESENT: ICD-10-CM

## 2019-12-12 DIAGNOSIS — M79.622 PAIN OF LEFT UPPER ARM: ICD-10-CM

## 2019-12-12 DIAGNOSIS — I48.20 CHRONIC ATRIAL FIBRILLATION (H): ICD-10-CM

## 2019-12-12 DIAGNOSIS — E11.42 TYPE 2 DIABETES MELLITUS WITH DIABETIC POLYNEUROPATHY, WITHOUT LONG-TERM CURRENT USE OF INSULIN (H): Primary | ICD-10-CM

## 2019-12-12 DIAGNOSIS — I10 HYPERTENSION, BENIGN ESSENTIAL, GOAL BELOW 140/90: ICD-10-CM

## 2019-12-12 LAB — INR POINT OF CARE: 2.9 (ref 0.86–1.14)

## 2019-12-12 PROCEDURE — 99207 ZZC NO CHARGE NURSE ONLY: CPT

## 2019-12-12 PROCEDURE — 99214 OFFICE O/P EST MOD 30 MIN: CPT | Performed by: FAMILY MEDICINE

## 2019-12-12 PROCEDURE — 73030 X-RAY EXAM OF SHOULDER: CPT | Mod: LT

## 2019-12-12 PROCEDURE — 85610 PROTHROMBIN TIME: CPT | Mod: QW

## 2019-12-12 PROCEDURE — 73030 X-RAY EXAM OF SHOULDER: CPT | Mod: RT

## 2019-12-12 PROCEDURE — 36416 COLLJ CAPILLARY BLOOD SPEC: CPT

## 2019-12-12 RX ORDER — ROSUVASTATIN CALCIUM 10 MG/1
10 TABLET, COATED ORAL DAILY
Qty: 90 TABLET | Refills: 3 | Status: SHIPPED | OUTPATIENT
Start: 2019-12-12 | End: 2020-12-14

## 2019-12-12 NOTE — PROGRESS NOTES
Subjective     Antoine Russo is a 73 year old male who presents to clinic today for the following health issues:    HPI   Diabetes Follow-up    How often are you checking your blood sugar? 4 times daily  Blood sugar testing frequency justification:  Uncontrolled diabetes  What time of day are you checking your blood sugars (select all that apply)?  Before and after meals  Have you had any blood sugars above 200?  Yes   Have you had any blood sugars below 70?  No    What symptoms do you notice when your blood sugar is low?  None    What concerns do you have today about your diabetes? Blood sugar is often over 200     Do you have any of these symptoms? (Select all that apply)  No numbness or tingling in feet.  No redness, sores or blisters on feet.  No complaints of excessive thirst.  No reports of blurry vision.  No significant changes to weight.     Have you had a diabetic eye exam in the last 12 months? No     Patient has Type 2 diabetes and  is checking blood sugars 4 times daily.  Patient is taking 4 multiple daily injections   Patient requires frequent adjustments to their insulin treatment regimen and would benefit greatly from a personal continuous glucose monitoring system.     On lispro, degludec insulin.     BP Readings from Last 2 Encounters:   12/12/19 128/80   12/06/19 (!) 157/72     Hemoglobin A1C (%)   Date Value   10/17/2019 9.0 (H)   07/18/2019 9.7 (H)     LDL Cholesterol Calculated (mg/dL)   Date Value   05/22/2019 82   10/22/2018 90       Diabetes Management Resources    Fell 11/9 down the steps. Was seen in the emergency department, xrays negative.   Since then hurts in the upper left arm, is unable to lift it.   Right shoulder has a lump there. Is painful all the time.     Back continues to be sore and painful. Did see the ortho doctor. Recommends surgery only if his A1C is under 8    Hyperlipidemia Follow-Up      Are you regularly taking any medication or supplement to lower your  cholesterol?   No    Are you having muscle aches or other side effects that you think could be caused by your cholesterol lowering medication?  No  Recent Labs   Lab Test 05/22/19  1159 10/22/18  1651  04/14/15  0853 05/15/14  0832   CHOL 151 163   < > 144 165   HDL 31* 43   < > 27* 30*   LDL 82 90   < > 76 75   TRIG 188* 148   < > 206* 302*   CHOLHDLRATIO  --   --   --  5.3* 5.0    < > = values in this interval not displayed.        afib  On metoprolol, warfarin.   Is unaware if has it or not    Hypertension Follow-up      Do you check your blood pressure regularly outside of the clinic? No     Are you following a low salt diet? No    Are your blood pressures ever more than 140 on the top number (systolic) OR more   than 90 on the bottom number (diastolic), for example 140/90? No    Is on metoprolol, lisinopril  BP Readings from Last 6 Encounters:   12/12/19 128/80   12/06/19 (!) 157/72   11/09/19 (!) 156/68   10/17/19 132/80   10/15/19 (!) 147/67   08/06/19 (!) 163/92        Recent Labs   Lab Test 10/17/19  0827 08/06/19  0245 07/18/19  0828 05/23/19  0618 05/22/19  1159 05/22/19  0447  04/16/19  2142  01/17/19  0834  10/22/18  1651  04/24/18  0940  03/16/18  0848   A1C 9.0*  --  9.7*  --   --   --   --  11.6*   < >  --    < > 6.9*   < >  --   --  7.7*   LDL  --   --   --   --  82  --   --   --   --   --   --  90  --   --   --  69   HDL  --   --   --   --  31*  --   --   --   --   --   --  43  --   --   --  30*   TRIG  --   --   --   --  188*  --   --   --   --   --   --  148  --   --   --  221*   ALT  --  38  --   --   --  25  --  28  --  31   < >  --    < > 11   < > 30   CR  --  0.94  --  1.15  --  1.09   < > 1.24  --  0.95   < >  --    < > 0.92   < > 1.11   GFRESTIMATED  --  80  --  63  --  68   < > 58*  --  80   < >  --    < > 81   < > 65   GFRESTBLACK  --  >90  --  74  --  79   < > 67  --  >90   < >  --    < > >90   < > 79   POTASSIUM  --  4.2  --  4.3  --  4.5   < > 4.4  --  4.5   < >  --    < > 4.8   < >  4.7   TSH  --   --   --   --   --   --   --   --   --  1.58  --   --   --  1.12  --   --     < > = values in this interval not displayed.      BP Readings from Last 3 Encounters:   12/12/19 128/80   12/06/19 (!) 157/72   11/09/19 (!) 156/68    Wt Readings from Last 3 Encounters:   12/12/19 98.9 kg (218 lb)   11/09/19 95.3 kg (210 lb)   10/17/19 95.3 kg (210 lb)           Reviewed and updated as needed this visit by Provider         Review of Systems   ROS COMP: Constitutional, HEENT, cardiovascular, pulmonary, gi and gu systems are negative, except as otherwise noted.      Objective    /80 (BP Location: Right arm)   Pulse 74   Temp 97.5  F (36.4  C) (Tympanic)   Wt 98.9 kg (218 lb)   SpO2 94%   BMI 31.28 kg/m    Body mass index is 31.28 kg/m .  Physical Exam   GENERAL: healthy, alert and no distress  NECK: no adenopathy, no asymmetry, masses, or scars and thyroid normal to palpation  RESP: lungs clear to auscultation - no rales, rhonchi or wheezes  CV: regular rate and rhythm, normal S1 S2, no S3 or S4, no murmur, click or rub, no peripheral edema and peripheral pulses strong  ABDOMEN: soft, nontender, no hepatosplenomegaly, no masses and bowel sounds normal  MS: no gross musculoskeletal defects noted, no edema  Shoulder: left shoulder Symmetric without erythema, ecchymoses, warmth, or edema. No tenderness to palpation around shoulder joint. There no pain with internal or external rotation.  No pain with flexion, extension, abduction or adduction. There is  weakness of the supraspinatus. Right shoulder is normal as well except the AC joint has a bony prominence and is tender to touch. EXT: pulses intact. Sensation to light touch intact.         (E11.42) Type 2 diabetes mellitus with diabetic polyneuropathy, without long-term current use of insulin (H)  (primary encounter diagnosis)  Comment: not well controlled  Plan: Lipid panel reflex to direct LDL Fasting, **A1C        FUTURE 1yr, **Comprehensive  metabolic panel         FUTURE 1yr, **TSH with free T4 reflex FUTURE         1yr        Will continue to work with Ruthann. I think he would benefit from a CGM and will ask for her help.     (I10) Hypertension, benign essential, goal below 140/90  Comment: well controlled  Plan: **Comprehensive metabolic panel FUTURE 1yr,         **TSH with free T4 reflex FUTURE 1yr        Continue lisinopril and metoprolol     (M48.061) Spinal stenosis of lumbar region, unspecified whether neurogenic claudication present  Comment: stable  Plan: plans to have surgery once his A1C is controlled    (E78.5) Hyperlipidemia LDL goal <100  Comment: well controlled  Plan: rosuvastatin (CRESTOR) 10 MG tablet, Lipid         panel reflex to direct LDL Fasting              (M25.511,  M25.512) Acute pain of both shoulders  Comment: likely rotator cuff tear on left   Plan: MR Shoulder Left w/o Contrast, CANCELED: XR         Shoulder Right 2 Views            (M79.622) Pain of left upper arm  Comment: rotator cuff tear  Plan: XR Shoulder Left G/E 3 Views, MR Shoulder Left         w/o Contrast            (I48.20) Chronic atrial fibrillation  Comment: not noted today  Plan: continue metoprolol and warfarin    Recheck 3 months     Katie Yoder MD

## 2019-12-12 NOTE — PROGRESS NOTES
ANTICOAGULATION FOLLOW-UP CLINIC VISIT    Patient Name:  Antoine Russo  Date:  2019  Contact Type:  Face to Face    SUBJECTIVE:  Patient Findings     Positives:   Change in medications (starting crestor today)    Comments:   Patient is having his shoulder x-rayed. He has a painful lump since his fall in early Nov. He is waiting for these results in PCP room. Notes from today's visit with PCP are not complete but it appears crestor is going to be prescribed. Per patient, he will start this soon. No other changes or concerns. Will recheck INR in 2 weeks due to crestor.         Clinical Outcomes     Comments:   Patient is having his shoulder x-rayed. He has a painful lump since his fall in early Nov. He is waiting for these results in PCP room. Notes from today's visit with PCP are not complete but it appears crestor is going to be prescribed. Per patient, he will start this soon. No other changes or concerns. Will recheck INR in 2 weeks due to crestor.            OBJECTIVE    INR Protime   Date Value Ref Range Status   2019 2.9 (A) 0.86 - 1.14 Final       ASSESSMENT / PLAN  INR assessment THER    Recheck INR In: 2 WEEKS    INR Location Clinic      Anticoagulation Summary  As of 2019    INR goal:   2.0-3.0   TTR:   67.5 % (10 mo)   INR used for dosin.9 (2019)   Warfarin maintenance plan:   3.75 mg (2.5 mg x 1.5) every Mon; 2.5 mg (2.5 mg x 1) all other days   Full warfarin instructions:   3.75 mg every Mon; 2.5 mg all other days   Weekly warfarin total:   18.75 mg   No change documented:   Aparna Kunz RN   Plan last modified:   Aparna Kunz RN (10/24/2019)   Next INR check:   2019   Priority:   INR   Target end date:   Indefinite    Indications    Chronic atrial fibrillation [I48.20]  Long term current use of anticoagulant therapy [Z79.01]             Anticoagulation Episode Summary     INR check location:       Preferred lab:       Send INR reminders to:   [facility]  [place]    Comments:   * 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 No bandaid      Anticoagulation Care Providers     Provider Role Specialty Phone number    Katie Martino MD [facility] [phone]            See the Encounter Report to view Anticoagulation Flowsheet and Dosing Calendar (Go to Encounters tab in chart review, and find the Anticoagulation Therapy Visit)        Aparna Kunz RN

## 2019-12-12 NOTE — PATIENT INSTRUCTIONS
The 10-year ASCVD risk score (Maury TRIANA Jr., et al., 2013) is: 40.8%    Values used to calculate the score:      Age: 73 years      Sex: Male      Is Non- : No      Diabetic: Yes      Tobacco smoker: No      Systolic Blood Pressure: 128 mmHg      Is BP treated: Yes      HDL Cholesterol: 31 mg/dL      Total Cholesterol: 151 mg/dL     Start the Crestor one at evening    MRI of the left shoulder

## 2019-12-12 NOTE — NURSING NOTE
"Chief Complaint   Patient presents with     Diabetes       Initial /80 (BP Location: Right arm)   Pulse 74   Temp 97.5  F (36.4  C) (Tympanic)   Wt 98.9 kg (218 lb)   SpO2 94%   BMI 31.28 kg/m   Estimated body mass index is 31.28 kg/m  as calculated from the following:    Height as of 10/17/19: 1.778 m (5' 10\").    Weight as of this encounter: 98.9 kg (218 lb).    Patient presents to the clinic using No DME    Health Maintenance that is potentially due pending provider review:  NONE    n/a    Is there anyone who you would like to be able to receive your results? No  If yes have patient fill out VIDYA    "

## 2019-12-17 ENCOUNTER — HOSPITAL ENCOUNTER (OUTPATIENT)
Dept: MRI IMAGING | Facility: CLINIC | Age: 74
Discharge: HOME | End: 2019-12-17
Attending: FAMILY MEDICINE | Admitting: FAMILY MEDICINE
Payer: COMMERCIAL

## 2019-12-17 DIAGNOSIS — M79.622 PAIN OF LEFT UPPER ARM: ICD-10-CM

## 2019-12-17 DIAGNOSIS — M25.511 ACUTE PAIN OF BOTH SHOULDERS: ICD-10-CM

## 2019-12-17 DIAGNOSIS — M25.512 ACUTE PAIN OF BOTH SHOULDERS: ICD-10-CM

## 2019-12-17 PROCEDURE — 73221 MRI JOINT UPR EXTREM W/O DYE: CPT | Mod: LT

## 2019-12-20 DIAGNOSIS — E11.42 TYPE 2 DIABETES MELLITUS WITH DIABETIC POLYNEUROPATHY, WITHOUT LONG-TERM CURRENT USE OF INSULIN (H): Chronic | ICD-10-CM

## 2019-12-20 NOTE — TELEPHONE ENCOUNTER
"Requested Prescriptions   Pending Prescriptions Disp Refills     insulin pen needle (BD LUIS U/F) 32G X 4 MM miscellaneous [Pharmacy Med Name: BD PEN NEEDLE/LUIS 22CL9MD MIS]  0     Sig: USE PEN NEEDLE ONCE DAILY AS DIRECTED       Diabetic Supplies Protocol Passed - 12/20/2019  9:12 AM        Passed - Medication is active on med list        Passed - Patient is 18 years of age or older        Passed - Recent (6 mo) or future (30 days) visit within the authorizing provider's specialty     Patient had office visit in the last 6 months or has a visit in the next 30 days with authorizing provider.  See \"Patient Info\" tab in inbasket, or \"Choose Columns\" in Meds & Orders section of the refill encounter.            insulin pen needle (BD LUIS U/F) 32G X 4 MM miscellaneous  Last Written Prescription Date:  07/09/2019  Last Fill Quantity: 100 each,  # refills: 11   Last office visit: 10/9/2019 with prescribing provider:  12/12/2019 VICKEY Martino   Future Office Visit:   Next 5 appointments (look out 90 days)    Feb 04, 2020  8:40 AM CST  Office Visit with Katie Martino MD  [facility] ([facility]) [address] [place] [id number] [phone]         Trinidad APONTE (R) (M)    "

## 2019-12-26 ENCOUNTER — ANTICOAGULATION THERAPY VISIT (OUTPATIENT)
Dept: ANTICOAGULATION | Facility: CLINIC | Age: 74
End: 2019-12-26
Payer: COMMERCIAL

## 2019-12-26 DIAGNOSIS — Z79.01 LONG TERM CURRENT USE OF ANTICOAGULANT THERAPY: ICD-10-CM

## 2019-12-26 DIAGNOSIS — I48.20 CHRONIC ATRIAL FIBRILLATION (H): ICD-10-CM

## 2019-12-26 LAB — INR POINT OF CARE: 4 (ref 0.86–1.14)

## 2019-12-26 PROCEDURE — 85610 PROTHROMBIN TIME: CPT | Mod: QW

## 2019-12-26 PROCEDURE — 36416 COLLJ CAPILLARY BLOOD SPEC: CPT

## 2019-12-26 PROCEDURE — 99207 ZZC NO CHARGE NURSE ONLY: CPT

## 2019-12-26 NOTE — PROGRESS NOTES
ANTICOAGULATION FOLLOW-UP CLINIC VISIT    Patient Name:  Antoine Russo  Date:  2019  Contact Type:  Face to Face    SUBJECTIVE:  Patient Findings     Comments:   Patient states he has had no recent illnesses, diarrhea or swelling. His shoulder is still hurting him but the MRI did not show any recent changes. He is supposed to see ortho if it continues to be painful. Patient did not have excess amounts of alcohol. He had 2 light beers on Tues and 1 yesterday. This is less than his usual. No diet or medication changes. His shoulder pain is not new. It is unclear why INR elevated today. Patient denies any bleeding. Will hold warfarin today, then resume usual dose and recheck INR in 2 weeks.        Clinical Outcomes     Negatives:   Major bleeding event, Thromboembolic event, Anticoagulation-related hospital admission, Anticoagulation-related ED visit, Anticoagulation-related fatality    Comments:   Patient states he has had no recent illnesses, diarrhea or swelling. His shoulder is still hurting him but the MRI did not show any recent changes. He is supposed to see ortho if it continues to be painful. Patient did not have excess amounts of alcohol. He had 2 light beers on Tues and 1 yesterday. This is less than his usual. No diet or medication changes. His shoulder pain is not new. It is unclear why INR elevated today. Patient denies any bleeding. Will hold warfarin today, then resume usual dose and recheck INR in 2 weeks.           OBJECTIVE    INR Protime   Date Value Ref Range Status   2019 4.0 (A) 0.86 - 1.14 Final       ASSESSMENT / PLAN  INR assessment SUPRA    Recheck INR In: 2 WEEKS    INR Location Clinic      Anticoagulation Summary  As of 2019    INR goal:   2.0-3.0   TTR:   64.9 % (10.5 mo)   INR used for dosin.0! (2019)   Warfarin maintenance plan:   3.75 mg (2.5 mg x 1.5) every Mon; 2.5 mg (2.5 mg x 1) all other days   Full warfarin instructions:   : Hold; Otherwise  3.75 mg every Mon; 2.5 mg all other days   Weekly warfarin total:   18.75 mg   Plan last modified:   Aparna Kunz RN (10/24/2019)   Next INR check:   1/9/2020   Priority:   INR   Target end date:   Indefinite    Indications    Chronic atrial fibrillation [I48.20]  Long term current use of anticoagulant therapy [Z79.01]             Anticoagulation Episode Summary     INR check location:       Preferred lab:       Send INR reminders to:   [facility]    Comments:   * 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 No bandaid. 2-3 light [facility] daily      Anticoagulation Care Providers     Provider Role Specialty Phone number    Katie Martino MD [facility] [phone]            See the Encounter Report to view Anticoagulation Flowsheet and Dosing Calendar (Go to Encounters tab in chart review, and find the Anticoagulation Therapy Visit)        Aparna Kunz RN

## 2020-01-04 ENCOUNTER — OFFICE VISIT (OUTPATIENT)
Dept: URGENT CARE | Facility: URGENT CARE | Age: 75
End: 2020-01-04
Payer: COMMERCIAL

## 2020-01-04 VITALS
RESPIRATION RATE: 16 BRPM | WEIGHT: 218 LBS | OXYGEN SATURATION: 92 % | HEART RATE: 91 BPM | TEMPERATURE: 98.2 F | DIASTOLIC BLOOD PRESSURE: 71 MMHG | SYSTOLIC BLOOD PRESSURE: 146 MMHG | BODY MASS INDEX: 31.28 KG/M2

## 2020-01-04 DIAGNOSIS — R04.0 BLEEDING FROM THE NOSE: Primary | ICD-10-CM

## 2020-01-04 DIAGNOSIS — R04.0 EPISTAXIS: ICD-10-CM

## 2020-01-04 LAB — INR PPP: 3.44 (ref 0.86–1.14)

## 2020-01-04 PROCEDURE — 85610 PROTHROMBIN TIME: CPT | Performed by: PHYSICIAN ASSISTANT

## 2020-01-04 PROCEDURE — 36415 COLL VENOUS BLD VENIPUNCTURE: CPT | Performed by: PHYSICIAN ASSISTANT

## 2020-01-04 PROCEDURE — 99213 OFFICE O/P EST LOW 20 MIN: CPT | Performed by: PHYSICIAN ASSISTANT

## 2020-01-04 ASSESSMENT — ENCOUNTER SYMPTOMS
CARDIOVASCULAR NEGATIVE: 1
RESPIRATORY NEGATIVE: 1

## 2020-01-04 NOTE — PROGRESS NOTES
SUBJECTIVE:   Antoine Russo is a 74 year old male presenting with a chief complaint of   Chief Complaint   Patient presents with     Epistaxis       He is an established patient of [facility].   Patient is on coumadin and presents with a nose bleed that started this morning.        Review of Systems   HENT: Negative.         Nose bleeding   Respiratory: Negative.    Cardiovascular: Negative.    All other systems reviewed and are negative.      Past Medical History:   Diagnosis Date     Acute kidney injury (H) 2019     Acute on chronic pancreatitis (H) 2018     Bacteriuria with pyuria 2019     C. difficile colitis      Colon polyp      Colon polyp 2011    Colonoscopy 2011-A sessile polyp was found in the cecum. The polyp was 6 mm in size. The polyp was removed with a hot snare. Resection and retrieval were complete. A sessile polyp was found in the proximal transverse colon. The polyp was 15 mm in size. The polyp was removed with a hot snare. Resection and retrieval were complete. A sessile polyp was found in the sigmoid colon. The polyp was 5 mm     Coronary artery disease      Diabetes mellitus (H)     type 2     Diverticulitis      Diverticulitis of colon 2007    Colitis on CT Scan 2011- [place] Colonoscopy 2011 Diverticulitis - Multiple small and large-mouthed diverticula were found in the mid sigmoid colon and at the hepatic flexure. There was narrowing of the colon in association with the diverticular opening. Nena-diverticular erythema was seen. There was evidence of an impacted diverticulum. 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     Noninfectious ileitis      Recurrent pancreatitis      Family History   Problem Relation Age of Onset     Diabetes Sister         onset age 50     Alzheimer Disease Mother          80     Alzheimer Disease Father           85     Diabetes Other         nephew type 1     Diabetes Other      Aneurysm Sister      Anesthesia Reaction No family hx of      Colon Cancer No family hx of      Colon Polyps No family hx of      Crohn's Disease No family hx of      Ulcerative Colitis No family hx of      Current Outpatient Medications   Medication Sig Dispense Refill     ASPIRIN NOT PRESCRIBED (INTENTIONAL) Antiplatelet medication not prescribed intentionally due to Current anticoagulant therapy (warfarin/enoxaparin)       blood glucose (CONTOUR NEXT TEST) test strip Use to test blood sugar 2 times daily or as directed. 100 each 11     gabapentin (NEURONTIN) 600 MG tablet Take 1 tablet (600 mg) by mouth 3 times daily Along with 300mg for a total of 900mg for bedtime dose 270 tablet 2     insulin degludec (TRESIBA) 200 UNIT/ML pen Take 78 units at bedtime (increase by 2 units every 4 days until am readings are under 120) max dose: 84. 18 mL 3     Insulin Lispro (HUMALOG KWIKPEN) 200 UNIT/ML soln Take 24 units with breakfast, 20 lunch, take 25 units with supper. All plus the high sliding scale.  Max dose: 105 units 30 mL 3     insulin pen needle (BD LUIS U/F) 32G X 4 MM miscellaneous USE PEN NEEDLE ONCE DAILY AS DIRECTED 60 each 0     lisinopril (PRINIVIL/ZESTRIL) 5 MG tablet Take 1 tablet (5 mg) by mouth daily 90 tablet 3     metoprolol succinate ER (TOPROL-XL) 50 MG 24 hr tablet TAKE 1 & 1/2 (ONE & ONE-HALF) TABLETS BY MOUTH TWICE DAILY 270 tablet 3     multivitamin, therapeutic with minerals (THERA-VIT-M) TABS Take 1 tablet by mouth daily. 30 each 1     rosuvastatin (CRESTOR) 10 MG tablet Take 1 tablet (10 mg) by mouth daily 90 tablet 3     tamsulosin (FLOMAX) 0.4 MG capsule TAKE 1 CAPSULE BY MOUTH ONCE DAILY 90 capsule 0     vitamin B-12 (CYANOCOBALAMIN) 100 MCG tablet Take 100 mcg by mouth daily       warfarin ANTICOAGULANT (COUMADIN) 2.5 MG tablet Take 3.75 mg every Monday; 2.5 mg all other days or as directed by the Anticoagulation  "Clinic 100 tablet 0     Social History     Tobacco Use     Smoking status: Former Smoker     Packs/day: 1.00     Years: 40.00     Pack years: 40.00     Types: Cigarettes     Last attempt to quit: 2006     Years since quittin.1     Smokeless tobacco: Never Used   Substance Use Topics     Alcohol use: Not Currently     Comment: \"1 beer on Thanksgiving day\"       OBJECTIVE  BP (!) 146/71   Pulse 91   Temp 98.2  F (36.8  C) (Tympanic)   Resp 16   Wt 98.9 kg (218 lb)   SpO2 92%   BMI 31.28 kg/m      Physical Exam  Vitals signs and nursing note reviewed.   Constitutional:       Appearance: Normal appearance. He is obese.   HENT:      Nose:      Comments: Right nostril not currently bleeding. Dry blood.  No friable vessel noted.     Mouth/Throat:      Mouth: Mucous membranes are moist.      Pharynx: Oropharynx is clear.   Eyes:      Extraocular Movements: Extraocular movements intact.      Conjunctiva/sclera: Conjunctivae normal.   Cardiovascular:      Rate and Rhythm: Normal rate and regular rhythm.      Pulses: Normal pulses.      Heart sounds: Normal heart sounds.   Pulmonary:      Effort: Pulmonary effort is normal.      Breath sounds: Normal breath sounds.   Skin:     General: Skin is warm and dry.      Capillary Refill: Capillary refill takes less than 2 seconds.   Neurological:      General: No focal deficit present.      Mental Status: He is alert.   Psychiatric:         Mood and Affect: Mood normal.         Labs:  No results found for this or any previous visit (from the past 24 hour(s)).    X-Ray was not done.    ASSESSMENT:      ICD-10-CM    1. Bleeding from the nose R04.0 INR   2. Epistaxis R04.0         Medical Decision Making:    Differential Diagnosis:  Symptomatic care.  Discussed in detail reasons to seek immediate medical attention.        Serious Comorbid Conditions:  Adult:  Malignancy and Anticoagulation    PLAN:    Symptomatic care.  Pt/INR pending.  Patient's  INR was 4.0 on " 12/26.  Followup:    If not improving or if condition worsens, follow up with your Primary Care Provider, If not improving or if conditions worsens over the next 12-24 hours, go to the Emergency Department    Patient Instructions     Patient Education     Nosebleed (Adult)    Bleeding from the nose most commonly occurs because of injury or drying and cracking of the inner lining of the nose. Most nosebleeds are because of dry air or nose-picking. They can occur during a common cold or an allergy attack. They can also occur on a very hot day, or from dry air in the winter.  If the bleeding site is found, it may be cauterized. This means it is treated to cause a blood clot to form. This may be done with a chemical, heat, or electricity. If the bleeding continues after the site is cauterized, or if the site cannot be found, packing may be put in your nose. This is to apply pressure and stop the bleeding. The packing may be made of gauze or sponge. A small balloon catheter is sometimes used. These must be removed by your healthcare provider. Some types of packing dissolve on their own. If you are taking blood thinning (anticoagulant) medicine, you may have a blood test.  Home care    If packing was put in your nose, unless told otherwise, do not pull on it or try to remove it yourself. You will be given an appointment to have it removed. You may also have been given antibiotics to prevent a sinus infection. If so, finish all of the medicine.    Don't blow your nose for 12 hours after the bleeding stops. This will allow a strong blood clot to form. Don't pick your nose. This may restart bleeding.    Don't drink alcohol or hot liquids for the next 2 days. Alcohol or hot liquids in your mouth can dilate blood vessels in your nose. This can cause bleeding to start again.    Don't take ibuprofen, naproxen, or medicines that contain aspirin. These thin the blood and may cause your nose to bleed. You may take acetaminophen for  pain, unless another pain medicine was prescribed.    If the bleeding starts again, sit up and lean forward to prevent swallowing blood. Pinch your nose tightly on both sides, as shown above, for 10 to 15 minutes. Time yourself. Don t release the pressure on your nose until 10 minutes is up. If bleeding does not stop, continue to pinch your nose and call your healthcare provider or return to this facility.    If you have a cold, allergies, or dry nasal membranes, lubricate the nasal passages. Apply a small amount of petroleum jelly inside the nose with a cotton swab twice a day (morning and night).    Don't overheat your home. This can dry the air and make your condition worse.    Put a humidifier in the room where you sleep. This will add moisture to the air. Clean the humidifier as advised by the .    Use a saline nasal spray to keep nasal passages moist.    Don't pick your nose. Keep fingernails trimmed to decrease risk of bleeds.    Don't smoke.  Follow-up care  Follow up with your healthcare provider, or as advised. Nasal packing should be rechecked or removed within 2 to 3 days.  When to seek medical advice  Call your healthcare provider right away if any of these occur.    You have another nosebleed that you cannot control    Dizziness, weakness, or fainting    You become tired or confused    Fever of 100.4 F (38 C) or higher, or as directed by your healthcare provider    Headache    Sinus or facial pain    Shortness of breath or trouble breathing  Date Last Reviewed: 11/1/2017 2000-2019 [employer]. [address] [place] [address], [place] [address]. All rights reserved. This information is not intended as a substitute for professional medical care. Always follow your healthcare professional's instructions.

## 2020-01-06 ENCOUNTER — TELEPHONE (OUTPATIENT)
Dept: EDUCATION SERVICES | Facility: CLINIC | Age: 75
End: 2020-01-06

## 2020-01-06 ENCOUNTER — TELEPHONE (OUTPATIENT)
Dept: FAMILY MEDICINE | Facility: CLINIC | Age: 75
End: 2020-01-06
Payer: COMMERCIAL

## 2020-01-06 DIAGNOSIS — E11.42 TYPE 2 DIABETES MELLITUS WITH DIABETIC POLYNEUROPATHY, WITHOUT LONG-TERM CURRENT USE OF INSULIN (H): Primary | Chronic | ICD-10-CM

## 2020-01-06 DIAGNOSIS — Z79.01 LONG TERM CURRENT USE OF ANTICOAGULANT THERAPY: ICD-10-CM

## 2020-01-06 DIAGNOSIS — I48.20 CHRONIC ATRIAL FIBRILLATION (H): ICD-10-CM

## 2020-01-06 PROCEDURE — 99207 ZZC NO CHARGE NURSE ONLY: CPT | Performed by: FAMILY MEDICINE

## 2020-01-06 RX ORDER — FLASH GLUCOSE SCANNING READER
1 EACH MISCELLANEOUS PRN
Qty: 1 DEVICE | Refills: 0 | Status: SHIPPED | OUTPATIENT
Start: 2020-01-06 | End: 2020-03-16

## 2020-01-06 RX ORDER — FLASH GLUCOSE SENSOR
1 KIT MISCELLANEOUS
Qty: 2 EACH | Refills: 11 | Status: SHIPPED | OUTPATIENT
Start: 2020-01-06 | End: 2020-03-16

## 2020-01-06 NOTE — TELEPHONE ENCOUNTER
Diabetes education contact:    Sent in sensor to [facility].    Note looks good for what is needed for medicare.      Hi, 12-10 185/89/102, 12-11 207 had pizza last nite)/200/112, 12-12 180 (didn't take injection. Thought they would draw blood at Dr's)/207/102, 12-13 187/223/107, 12-14 206/oops/146, 12-15 178/227/143. Anything about a new pokeless meter?  thought it would be a good idea since I am testing 3 times daily....Bookem       If not having any low blood sugars at night then increase the supper dose by 2 units.    Ruthann Alaniz RD on 1/6/2020 at 2:40 PM

## 2020-01-06 NOTE — TELEPHONE ENCOUNTER
ANTICOAGULATION FOLLOW-UP CLINIC VISIT    Patient Name:  Antoine Russo  Date:  1/6/2020  Contact Type:  Telephone    SUBJECTIVE:  Patient Findings     Comments:   Patient reports having a few beers but no more than what he typically consumes. Will lower maintenance dose by 1.25 mg (6.7%). He will recheck as scheduled on 1/9.          Clinical Outcomes     Negatives:   Major bleeding event, Thromboembolic event, Anticoagulation-related hospital admission, Anticoagulation-related ED visit, Anticoagulation-related fatality    Comments:   Patient reports having a few beers but no more than what he typically consumes. Will lower maintenance dose by 1.25 mg (6.7%). He will recheck as scheduled on 1/9.             OBJECTIVE    INR   Date Value Ref Range Status   01/04/2020 3.44 (H) 0.86 - 1.14 Final       ASSESSMENT / PLAN      Anticoagulation Summary  As of 1/6/2020    INR goal:   2.0-3.0   TTR:   61.8 % (10.4 mo)   INR used for dosing:   3.44! 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 No bandaid. 2-3 light beers daily      Anticoagulation Care Providers     Provider Role Specialty Phone number    Katie Martino MD [facility] [phone]            See the Encounter Report to view Anticoagulation Flowsheet and Dosing Calendar (Go to Encounters tab in chart review, and find the Anticoagulation Therapy Visit)        Anais Lorenzo RN

## 2020-01-09 ENCOUNTER — ANTICOAGULATION THERAPY VISIT (OUTPATIENT)
Dept: ANTICOAGULATION | Facility: CLINIC | Age: 75
End: 2020-01-09
Payer: COMMERCIAL

## 2020-01-09 DIAGNOSIS — I48.20 CHRONIC ATRIAL FIBRILLATION (H): ICD-10-CM

## 2020-01-09 DIAGNOSIS — Z79.01 LONG TERM CURRENT USE OF ANTICOAGULANT THERAPY: ICD-10-CM

## 2020-01-09 LAB — INR POINT OF CARE: 4.1 (ref 0.86–1.14)

## 2020-01-09 PROCEDURE — 85610 PROTHROMBIN TIME: CPT | Mod: QW

## 2020-01-09 PROCEDURE — 36416 COLLJ CAPILLARY BLOOD SPEC: CPT

## 2020-01-09 PROCEDURE — 99207 ZZC NO CHARGE NURSE ONLY: CPT

## 2020-01-09 RX ORDER — WARFARIN SODIUM 2.5 MG/1
TABLET ORAL
Qty: 100 TABLET | Refills: 0
Start: 2020-01-09 | End: 2020-01-30

## 2020-01-09 NOTE — PROGRESS NOTES
ANTICOAGULATION FOLLOW-UP CLINIC VISIT    Patient Name:  Antoine Rsuso  Date:  1/9/2020  Contact Type:  Face to Face    SUBJECTIVE:  Patient Findings     Positives:   Signs/symptoms of bleeding    Comments:   Patient has been having daily hosebleeds since he went to  on 1-4-2020 for this issue (his nosebleed on 1-4 lasted more than 2 hours). He did not have one yesterday or today yet. INR continues to be elevated, despite a reduction in dosing of warfarin. Patient is still drinking 3 light beers daily. He continues to cut carbs from his diet but states his weight is increasing. No medication changes. No other changes. Patient is aware of how to stop and help prevent nosebleeds. He has a humidifier already in his room. He is aware to avoid blowing his nose and to try moisturizing nasal sprays. Will hold warfarin today and reduce maintenance warfarin another 14% and recheck INR in 1 week.        Clinical Outcomes     Comments:   Patient has been having daily hosebleeds since he went to  on 1-4-2020 for this issue (his nosebleed on 1-4 lasted more than 2 hours). He did not have one yesterday or today yet. INR continues to be elevated, despite a reduction in dosing of warfarin. Patient is still drinking 3 light beers daily. He continues to cut carbs from his diet but states his weight is increasing. No medication changes. No other changes. Patient is aware of how to stop and help prevent nosebleeds. He has a humidifier already in his room. He is aware to avoid blowing his nose and to try moisturizing nasal sprays. Will hold warfarin today and reduce maintenance warfarin another 14% and recheck INR in 1 week.           OBJECTIVE    INR Protime   Date Value Ref Range Status   01/09/2020 4.1 (A) 0.86 - 1.14 Final       ASSESSMENT / PLAN  INR assessment SUPRA    Recheck INR In: 1 WEEK    INR Location Clinic      Anticoagulation Summary  As of 1/9/2020    INR goal:   2.0-3.0   TTR:   60.6 % (10.5 mo)   INR used for  dosin.1! (2020)   Warfarin maintenance plan:   1.25 mg (2.5 mg x 0.5) every Mon, Thu; 2.5 mg (2.5 mg x 1) all other days   Full warfarin instructions:   : Hold; Otherwise 1.25 mg every Mon, Thu; 2.5 mg all other days   Weekly warfarin total:   15 mg   Plan last modified:   Aparna Kunz RN (2020)   Next INR check:   2020   Priority:   INR   Target end date:   Indefinite    Indications    Chronic atrial fibrillation [I48.20]  Long term current use of anticoagulant therapy [Z79.01]             Anticoagulation Episode Summary     INR check location:       Preferred lab:       Send INR reminders to:   [facility]    Comments:   * 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 No bandaid. 2-3 light beers daily      Anticoagulation Care Providers     Provider Role Specialty Phone number    Katie Martino MD [facility] [phone]            See the Encounter Report to view Anticoagulation Flowsheet and Dosing Calendar (Go to Encounters tab in chart review, and find the Anticoagulation Therapy Visit)        Aparna Kunz RN

## 2020-01-10 ENCOUNTER — TELEPHONE (OUTPATIENT)
Dept: FAMILY MEDICINE | Facility: CLINIC | Age: 75
End: 2020-01-10

## 2020-01-10 DIAGNOSIS — E11.42 TYPE 2 DIABETES MELLITUS WITH DIABETIC POLYNEUROPATHY, WITHOUT LONG-TERM CURRENT USE OF INSULIN (H): Primary | Chronic | ICD-10-CM

## 2020-01-10 NOTE — TELEPHONE ENCOUNTER
CENTRAL PRIOR AUTHORIZATION  [phone]    PA Initiation    Medication: Tresiba FlexTouch 200 units/mL soln - Initiated   Insurance Company: David ([facility]) - Phone [phone] Fax [phone]  Pharmacy Filling the Rx: [facility] [address]  Filling Pharmacy Phone: [phone]  Filling Pharmacy Fax:    Start Date: 1/10/2020

## 2020-01-10 NOTE — TELEPHONE ENCOUNTER
Prior Authorization Retail Medication Request    Medication/Dose: tresiba  ICD code (if different than what is on RX):  Type 2 diabetes mellitus with diabetic polyneuropathy, without long-term current use of insulin (H) [E11.42]   Previously Tried and Failed:     Rationale:  Pt stable on this med.  Increased to 78U daily.    Insurance Name:     Insurance ID:         Pharmacy Information (if different than what is on RX)  Name:     Phone:

## 2020-01-13 NOTE — TELEPHONE ENCOUNTER
PRIOR AUTHORIZATION DENIED    Medication: Tresiba FlexTouch 200 units/mL soln - DENIED     Denial Date: 01/10/2020    Denial Rational: THE PATIENT NEEDS TO HAVE TRIED AND FAILED: LANTUS AND TOUJEO.        Appeal Information: IF THE PROVIDER WOULD LIKE APPEAL THIS DENIAL, PLEASE HAVE THEM PROVIDE A LETTER OF MEDICAL NECESSITY ALONG WITH ANY DOCUMENTATION THAT STATES THERAPIES TRIED/OUTCOMES. ONCE IT HAS BEEN PLACED IN THE PATIENT'S CHART, PLEASE NOTIFY THE PA TEAM ONCE IT HAS BEEN COMPLETED AND WE CAN INITIATE THE APPEAL ON BEHALF OF THE PROVIDER AND PATIENT.

## 2020-01-14 NOTE — TELEPHONE ENCOUNTER
Diabetes education contact:    Suggest pt take Toujeo 78 units at bedtime due to formulary change.    Dr. Calderón can you ok since Dr. Martino is on vacation.    Ruthann Alaniz RD on 1/14/2020 at 3:10 PM

## 2020-01-15 ENCOUNTER — TELEPHONE (OUTPATIENT)
Dept: FAMILY MEDICINE | Facility: CLINIC | Age: 75
End: 2020-01-15

## 2020-01-16 ENCOUNTER — ANTICOAGULATION THERAPY VISIT (OUTPATIENT)
Dept: ANTICOAGULATION | Facility: CLINIC | Age: 75
End: 2020-01-16
Payer: COMMERCIAL

## 2020-01-16 DIAGNOSIS — Z79.01 LONG TERM CURRENT USE OF ANTICOAGULANT THERAPY: ICD-10-CM

## 2020-01-16 DIAGNOSIS — I48.20 CHRONIC ATRIAL FIBRILLATION (H): ICD-10-CM

## 2020-01-16 LAB — INR POINT OF CARE: 3.8 (ref 0.86–1.14)

## 2020-01-16 PROCEDURE — 85610 PROTHROMBIN TIME: CPT | Mod: QW

## 2020-01-16 PROCEDURE — 99207 ZZC NO CHARGE NURSE ONLY: CPT

## 2020-01-16 PROCEDURE — 36416 COLLJ CAPILLARY BLOOD SPEC: CPT

## 2020-01-16 NOTE — PROGRESS NOTES
ADDENDUM: Anticoag Chart Review following ED visit  Visit date(s): 1/20/20    Reason for visit: Epistaxis    New medications: See below: patient was directed by ED Physician to hold warfarin Mon 1/20, Tue 1/21 and take 1.25 mg Wed 1/22, recheck Thursday 1/23.      Next INR check date: 1/23/20   This recheck date is an appropriate timeframe given the changes noted during the ED/hospitalization.    Maria Victoria Castaneda RN 01/21/20 at 10:17 AM      ANTICOAGULATION FOLLOW-UP CLINIC VISIT    Patient Name:  Antoine Russo  Date:  1/16/2020  Contact Type:  Face to Face    SUBJECTIVE:  Patient Findings     Positives:   Missed doses (intentional hold 7 days ago), Extra doses    Comments:   Patient went back to old dosing after his hold on 1-9-20. He forgot we were lowering his maintenance dose. INR remains elevated. Will reduce dose again to 1.25 mg on Mon/Thurs; 2.5 mg ROW. Recheck in 1 week. No other changes or concerns. Patient has had no further issues with nosebleeds.        Clinical Outcomes     Comments:   Patient went back to old dosing after his hold on 1-9-20. He forgot we were lowering his maintenance dose. INR remains elevated. Will reduce dose again to 1.25 mg on Mon/Thurs; 2.5 mg ROW. Recheck in 1 week. No other changes or concerns. Patient has had no further issues with nosebleeds.           OBJECTIVE    INR Protime   Date Value Ref Range Status   01/16/2020 3.8 (A) 0.86 - 1.14 Final       ASSESSMENT / PLAN  INR assessment SUPRA    Recheck INR In: 1 WEEK    INR Location Clinic      Anticoagulation Summary  As of 1/16/2020    INR goal:   2.0-3.0   TTR:   58.4 % (10.5 mo)   INR used for dosing:   3.8! (1/16/2020)   Warfarin maintenance plan:   1.25 mg (2.5 mg x 0.5) every Mon, Thu; 2.5 mg (2.5 mg x 1) all other days   Full warfarin instructions:   1.25 mg every Mon, Thu; 2.5 mg all other days   Weekly warfarin total:   15 mg   No change documented:   Aparna Kunz RN   Plan last modified:   Joaquina  FRANCHESCA Braun (1/9/2020)   Next INR check:   1/23/2020   Priority:   High   Target end date:   Indefinite    Indications    Chronic atrial fibrillation [I48.20]  Long term current use of anticoagulant therapy [Z79.01]             Anticoagulation Episode Summary     INR check location:       Preferred lab:       Send INR reminders to:   [facility]    Comments:   * 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 No bandaid. 2-3 light beers daily      Anticoagulation Care Providers     Provider Role Specialty Phone number    Katie Martino MD [facility] [phone]            See the Encounter Report to view Anticoagulation Flowsheet and Dosing Calendar (Go to Encounters tab in chart review, and find the Anticoagulation Therapy Visit)        Aparna Kunz RN

## 2020-01-20 ENCOUNTER — OFFICE VISIT (OUTPATIENT)
Dept: URGENT CARE | Facility: URGENT CARE | Age: 75
End: 2020-01-20
Payer: COMMERCIAL

## 2020-01-20 ENCOUNTER — HOSPITAL ENCOUNTER (EMERGENCY)
Facility: CLINIC | Age: 75
Discharge: HOME | End: 2020-01-20
Attending: EMERGENCY MEDICINE | Admitting: EMERGENCY MEDICINE
Payer: COMMERCIAL

## 2020-01-20 VITALS
DIASTOLIC BLOOD PRESSURE: 75 MMHG | HEIGHT: 70 IN | BODY MASS INDEX: 30.78 KG/M2 | HEART RATE: 82 BPM | OXYGEN SATURATION: 96 % | SYSTOLIC BLOOD PRESSURE: 159 MMHG | WEIGHT: 215 LBS

## 2020-01-20 VITALS
BODY MASS INDEX: 30.85 KG/M2 | DIASTOLIC BLOOD PRESSURE: 60 MMHG | RESPIRATION RATE: 18 BRPM | HEART RATE: 70 BPM | WEIGHT: 215 LBS | SYSTOLIC BLOOD PRESSURE: 138 MMHG | TEMPERATURE: 97.1 F

## 2020-01-20 DIAGNOSIS — R04.0 EPISTAXIS: Primary | ICD-10-CM

## 2020-01-20 DIAGNOSIS — Z79.01 CHRONIC ANTICOAGULATION: ICD-10-CM

## 2020-01-20 DIAGNOSIS — R04.0 EPISTAXIS: ICD-10-CM

## 2020-01-20 LAB
BASOPHILS # BLD AUTO: 0 10E9/L (ref 0–0.2)
BASOPHILS NFR BLD AUTO: 0 %
DIFFERENTIAL METHOD BLD: ABNORMAL
EOSINOPHIL # BLD AUTO: 0.3 10E9/L (ref 0–0.7)
EOSINOPHIL NFR BLD AUTO: 3 %
ERYTHROCYTE [DISTWIDTH] IN BLOOD BY AUTOMATED COUNT: 15.1 % (ref 10–15)
HCT VFR BLD AUTO: 43 % (ref 40–53)
HGB BLD-MCNC: 14.2 G/DL (ref 13.3–17.7)
INR PPP: 3.55 (ref 0.86–1.14)
LYMPHOCYTES # BLD AUTO: 4.9 10E9/L (ref 0.8–5.3)
LYMPHOCYTES NFR BLD AUTO: 43 %
MCH RBC QN AUTO: 32.8 PG (ref 26.5–33)
MCHC RBC AUTO-ENTMCNC: 33 G/DL (ref 31.5–36.5)
MCV RBC AUTO: 99 FL (ref 78–100)
MONOCYTES # BLD AUTO: 1 10E9/L (ref 0–1.3)
MONOCYTES NFR BLD AUTO: 9 %
NEUTROPHILS # BLD AUTO: 5.1 10E9/L (ref 1.6–8.3)
NEUTROPHILS NFR BLD AUTO: 45 %
NRBC # BLD AUTO: 0.1 10*3/UL
NRBC BLD AUTO-RTO: 1 /100
PLATELET # BLD AUTO: 228 10E9/L (ref 150–450)
PLATELET # BLD EST: ABNORMAL 10*3/UL
POLYCHROMASIA BLD QL SMEAR: SLIGHT
RBC # BLD AUTO: 4.33 10E12/L (ref 4.4–5.9)
WBC # BLD AUTO: 11.3 10E9/L (ref 4–11)

## 2020-01-20 PROCEDURE — 30905 CONTROL OF NOSEBLEED: CPT | Performed by: EMERGENCY MEDICINE

## 2020-01-20 PROCEDURE — 30905 CONTROL OF NOSEBLEED: CPT | Mod: Z6 | Performed by: EMERGENCY MEDICINE

## 2020-01-20 PROCEDURE — 99214 OFFICE O/P EST MOD 30 MIN: CPT | Performed by: NURSE PRACTITIONER

## 2020-01-20 PROCEDURE — 85610 PROTHROMBIN TIME: CPT | Performed by: EMERGENCY MEDICINE

## 2020-01-20 PROCEDURE — 99284 EMERGENCY DEPT VISIT MOD MDM: CPT | Mod: 25 | Performed by: EMERGENCY MEDICINE

## 2020-01-20 PROCEDURE — 85025 COMPLETE CBC W/AUTO DIFF WBC: CPT | Performed by: EMERGENCY MEDICINE

## 2020-01-20 ASSESSMENT — ENCOUNTER SYMPTOMS
DIZZINESS: 0
VOMITING: 0
ABDOMINAL PAIN: 0
LIGHT-HEADEDNESS: 0
RHINORRHEA: 0
HEADACHES: 0
NAUSEA: 0
MYALGIAS: 1
FEVER: 0
COUGH: 0
SORE THROAT: 0

## 2020-01-20 ASSESSMENT — MIFFLIN-ST. JEOR: SCORE: 1731.48

## 2020-01-20 NOTE — ED AVS SNAPSHOT
[facility]  [address] [facility] [address]  Phone:  [phone]  Fax:  [phone]                                    Antoine Russo   MRN: [id number]    Department:  [facility]   Date of Visit:  1/20/2020           After Visit Summary Signature Page    I have received my discharge instructions, and my questions have been answered. I have discussed any challenges I see with this plan with the nurse or doctor.    ..........................................................................................................................................  Patient/Patient Representative Signature      ..........................................................................................................................................  Patient Representative Print Name and Relationship to Patient    ..................................................               ................................................  Date                                   Time    ..........................................................................................................................................  Reviewed by Signature/Title    ...................................................              ..............................................  Date                                               Time          22EPIC Rev 08/18

## 2020-01-21 ENCOUNTER — TELEPHONE (OUTPATIENT)
Dept: FAMILY MEDICINE | Facility: CLINIC | Age: 75
End: 2020-01-21

## 2020-01-21 NOTE — TELEPHONE ENCOUNTER
Per [facility] pharmacy sent Fax regarding Cuong Alcala.   Spoke with Lisbet at Pharmacy as all that was on the fax was FYI: Pt Copay $100.92 for 23 days.  Called to find out Insurance number to call or what did the pharmacy want us to do change medication.  Lisbet spoke with Pharmacist Xander. He said to disregard at this time.  Janie Orn Station Sec

## 2020-01-21 NOTE — ED PROVIDER NOTES
History     Chief Complaint   Patient presents with     Epistaxis     started at 1600, on coumadin. Coming from [facility].      HPI  Antoine Russo is a 74 year old male who presents with epistaxis mainly on the right side, some going down his throat, began at about 4:00 this afternoon without inciting trauma.  He has had intermittent epistaxis episodes over the past 9 to 12 months.  Chronic anticoagulation Coumadin secondary to atrial fibrillation.  Last INR 1/15 was 3.8.  Dose Coumadin decreased.  Scheduled for recheck on 1/23.    Allergies:  Allergies   Allergen Reactions     Nkda [No Known Drug Allergies]        Problem List:    Patient Active Problem List    Diagnosis Date Noted     Anticipated difficulty with intubation 05/23/2017     Priority: High     Class: Chronic     Difficult two hands mask ventilation, intubated multiple times asleep with video laryngoscope. H/o tongue cancer surgery.        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 Please refer if Care Coordination services are needed.    Presenting Problem Signs and Symptoms Treatment Plan   Questions or concerns    during clinic hours   I will call my clinic directly:  [address], [place] [address]  [phone].   Questions or concerns outside clinic hours   I will call the 24 hour nurse line at   [phone] or [facility].   Need to schedule an appointment   I will call the 24 hour scheduling team at [phone] or my clinic directly at [phone].   Same day treatment     I will call my clinic first, nurse line if after hours, urgent care and express care if needed.   Clinic care coordination services (regular clinic hours)   I will call a clinic care coordinator directly:     Shelia Emanuel RN [facility]  [phone]    JAY Estes:    [phone]    Or call my clinic at [phone] and ask to speak with care coordination.   Crisis Services: Behavioral or Mental Health  [facility] 24 Hour Phone Line  [phone]    [facility] 24 Hour Crisis Services  [phone]    [facility] (Behavioral Health Providers) [facility] [phone]    [facility]   [phone]     Emergency treatment -- Immediately    [facility]All 911                Clostridium difficile enterocolitis 12/18/2012     Priority: Medium     Groin fluid collection 12/15/2012     Priority: Medium     CT 12/12- New (since 5/11) collection measuring at least 2.3 cm in diameter and 6.5 cm in length within the right inguinal canal. 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 Gerson Ravi and collegues at the  in 2012  NAD 2017  CT scan of the neck every 2-3 years.  - Thyroid labs yearly.  - Carotid ultrasound in three to four  years to evaluate for stenosis.       Hyperlipidemia LDL goal <100 10/31/2010     Priority: Medium     CAD (coronary artery disease) 05/26/2009     Priority: Medium     Stress testing 2009 showed inferior wall ischemia.  Cath preformed; identified moderate CAD with stenosis of 40-50% in LAD and RCA.  No stents were placed.  Echo in 01/2018 (done while in Afib with rates of 100-110); EF of 55-60%.  No RWMA.       GERD (gastroesophageal reflux disease) 05/26/2009     Priority: Medium     Hypertrophy of breast 09/25/2007     Priority: Medium     PERS HX TOBACCO USE - quit in 11/06 with chantix 03/15/2007     Priority: Medium     Hypertension, benign essential, goal below 140/90 11/07/2005     Priority: Medium     Patient has only fair bp control and with family history of diabetes will all ace if lab indicated also has bph and may op for psa and not digital exam next yr        Pain in joint, shoulder region 11/07/2005     Priority: Medium     Hypertrophy of prostate without urinary obstruction 11/07/2005     Priority: Medium     Problem list name updated by automated process. 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 difficile colitis      Colon polyp      Colon polyp 8/26/2011     Coronary artery disease      Diabetes mellitus (H)      Diverticulitis      Diverticulitis of colon 7/17/2007     Hypertension      Leukocytosis 4/17/2019     Malignant neoplasm (H)      Noninfectious ileitis      Recurrent pancreatitis        Past Surgical History:    Past Surgical History:   Procedure Laterality Date     BREAST SURGERY  2008    right breast mass benign     COLONOSCOPY      multiple polyps removed     COLONOSCOPY  8/24/2011    Procedure:COMBINED COLONOSCOPY, REMOVE TUMOR/POLYP/LESION  BY SNARE; Surgeon:MILEY ARBOLEDA; Location:[facility]     COLONOSCOPY  12/17/2012    Procedure: COLONOSCOPY;;  Surgeon: Leon Maurer MD;  [facility]     COLONOSCOPY  12/18/2012    Procedure: COLONOSCOPY;;  Surgeon: Leon Maurer MD;  [facility]     DENERVATION OF SPERMATIC CORD MICROSURGICAL Left 5/23/2017    Procedure: DENERVATION OF SPERMATIC CORD MICROSURGICAL;;  Surgeon: Marcio Aggarwal MD;  [facility]     DISSECTION RADICAL NECK BILATERAL  8/2/2012    Procedure: DISSECTION RADICAL NECK BILATERAL;;  Surgeon: Yung Alvares MD;  [facility]     ENDOSCOPIC RETROGRADE CHOLANGIOPANCREATOGRAM N/A 5/10/2016    Procedure: COMBINED ENDOSCOPIC RETROGRADE CHOLANGIOPANCREATOGRAPHY, PLACE TUBE/STENT;  Surgeon: Yovanny Beasley MD;  [facility]     ENDOSCOPIC RETROGRADE CHOLANGIOPANCREATOGRAM N/A 3/29/2018    Procedure: ENDOSCOPIC RETROGRADE CHOLANGIOPANCREATOGRAM;  Endoscopic Retrograde Cholangiopancreatogram, Endoscopic Ultrasound, Biliary Sphincterotomy, Biliary and Pancreatic Stent Placement;  Surgeon: Yovanny Beasley MD;  [facility]     ENDOSCOPIC ULTRASOUND UPPER GASTROINTESTINAL TRACT (GI) N/A 2/3/2016    Procedure: ENDOSCOPIC ULTRASOUND, ESOPHAGOSCOPY / UPPER GASTROINTESTINAL TRACT (GI);  Surgeon: Grabiel Plata MD;  [facility]     ENDOSCOPIC ULTRASOUND UPPER GASTROINTESTINAL TRACT (GI) N/A 3/29/2018    Procedure: ENDOSCOPIC ULTRASOUND, ESOPHAGOSCOPY / UPPER GASTROINTESTINAL TRACT (GI);;  Surgeon: Grabiel Plata MD;  [facility]     ESOPHAGOSCOPY, GASTROSCOPY, DUODENOSCOPY (EGD), COMBINED N/A 2/3/2016    Procedure: COMBINED ENDOSCOPIC ULTRASOUND, ESOPHAGOSCOPY, GASTROSCOPY, DUODENOSCOPY (EGD), FINE NEEDLE ASPIRATE/BIOPSY;  Surgeon: Grabiel Plata MD;  [facility]     ESOPHAGOSCOPY, GASTROSCOPY, DUODENOSCOPY (EGD), COMBINED N/A 6/8/2016    Procedure: COMBINED ESOPHAGOSCOPY, GASTROSCOPY, DUODENOSCOPY (EGD), REMOVE FOREIGN  BODY;  Surgeon: Yovanny Beasley MD;  [facility]     ESOPHAGOSCOPY, GASTROSCOPY, DUODENOSCOPY (EGD), COMBINED N/A 4/17/2018    Procedure: COMBINED ESOPHAGOSCOPY, GASTROSCOPY, DUODENOSCOPY (EGD), REMOVE FOREIGN BODY;  EGD with stent removal;  Surgeon: Grabiel Plata MD;  [facility]     EXCISE LESION INTRAORAL  6/14/2012    Procedure: EXCISE LESION INTRAORAL;  Wide Local Excision Floor of Mouth, Direct Laryngoscopy, Bilateral Hico's Marsuplization, Split Thickness Skin Graft from right Thigh  Latex Safe;  Surgeon: Gerson Ravi MD;  [facility]     EXCISE LESION INTRAORAL  8/2/2012    Procedure: EXCISE LESION INTRAORAL;  Floor of Mouth Resection, Bilateral Selective Radical Neck Dissection, Tracheostomy, Left Radial Forearm  Free Flap with Alloderm, Nasogastric Feeding Tube Placement,    * Latex Safe*;  Surgeon: Gerson Ravi MD;  [facility]     EXCISE LESION INTRAORAL  12/11/2012    Procedure: EXCISE LESION INTRAORAL;  takedown of oral flap;  Surgeon: Yung Alvares MD;  [facility]     GRAFT FREE VASCULARIZED (LOCATION)  8/2/2012    Procedure: GRAFT FREE VASCULARIZED (LOCATION);;  Surgeon: Yung Alvares MD;  [facility]     GRAFT SKIN SPLIT THICKNESS FROM EXTREMITY  6/14/2012    Procedure: GRAFT SKIN SPLIT THICKNESS FROM EXTREMITY;;  Surgeon: Gerson Ravi MD;  [facility]     LAPAROSCOPIC ILEOSTOMY TAKEDOWN  6/6/2013    Procedure: LAPAROSCOPIC ILEOSTOMY TAKEDOWN;  Laparoscopic Closure of Enterostomy, Guerda's Type with IleoRectal Anastomosis ;  Surgeon: Grabiel Riddle MD;  [facility]     LAPAROTOMY EXPLORATORY  12/20/2012    Procedure: LAPAROTOMY EXPLORATORY;  Exploratory Laparotomy, total abdominal colectomy, ileostomy formation;  Surgeon: Miquel Cannon MD;  [facility]     LARYNGOSCOPY  6/14/2012    Procedure: LARYNGOSCOPY;;  Surgeon: Gerson Ravi MD;  [facility]     ORTHOPEDIC SURGERY      ganglian cyst left ankle      "PANCREATECTOMY, SPLENECTOMY N/A 3/10/2016    Procedure: PANCREATECTOMY, SPLENECTOMY;  Surgeon: Nael Abel MD;  [facility]     SHOULDER SURGERY  ,     2006- right rotator cuff,  bone spur on left. Dr. Hdez     VARICOCELECTOMY Left 2017    Procedure: VARICOCELECTOMY;  Left Varicocele Repair, Denervation of Left Testis;  Surgeon: Marcio Aggarwal MD;  [facility]       Family History:    Family History   Problem Relation Age of Onset     Diabetes Sister         onset age 50     Alzheimer Disease Mother          80     Alzheimer Disease Father          85     Diabetes Other         nephew type 1     Diabetes Other      Aneurysm Sister      Anesthesia Reaction No family hx of      Colon Cancer No family hx of      Colon Polyps No family hx of      Crohn's Disease No family hx of      Ulcerative Colitis No family hx of        Social History:  Marital Status:   [2]  Social History     Tobacco Use     Smoking status: Former Smoker     Packs/day: 1.00     Years: 40.00     Pack years: 40.00     Types: Cigarettes     Last attempt to quit: 2006     Years since quittin.1     Smokeless tobacco: Never Used   Substance Use Topics     Alcohol use: Not Currently     Comment: \"1 beer on Thanksgiving day\"     Drug use: No        Medications:    ASPIRIN NOT PRESCRIBED (INTENTIONAL)  blood glucose (CONTOUR NEXT TEST) test strip  Continuous Blood Gluc  (FREESTYLE LISA 14 DAY READER) JOSE ALBERTO  Continuous Blood Gluc Sensor (FREESTYLE LISA 14 DAY SENSOR) MISC  gabapentin (NEURONTIN) 600 MG tablet  insulin degludec (TRESIBA) 200 UNIT/ML pen  insulin glargine U-300 (TOUJEO) 300 UNIT/ML (1 units dial) pen  Insulin Lispro (HUMALOG KWIKPEN) 200 UNIT/ML soln  insulin pen needle (BD LUIS U/F) 32G X 4 MM miscellaneous  lisinopril (PRINIVIL/ZESTRIL) 5 MG tablet  metoprolol succinate ER (TOPROL-XL) 50 MG 24 hr tablet  multivitamin, therapeutic with minerals (THERA-VIT-M) " "TABS  rosuvastatin (CRESTOR) 10 MG tablet  tamsulosin (FLOMAX) 0.4 MG capsule  vitamin B-12 (CYANOCOBALAMIN) 100 MCG tablet  warfarin ANTICOAGULANT (COUMADIN) 2.5 MG tablet          Review of Systems  Problem focused review of systems otherwise negative    Physical Exam   BP: (!) 169/82  Pulse: 85  Height: 177.8 cm (5' 10\")  Weight: 97.5 kg (215 lb)  SpO2: 96 %      Physical Exam  Nasal packing on the right side this is removed, large clot.  No anterior source of bleeding on either the right or left side, no active bleeding, oropharynx shows moderate size clot posteriorly  ED Course        Procedures  7.5 cm right nasal balloon without complication tolerated procedure well no further bleeding             Critical Care time:  none               Results for orders placed or performed during the hospital encounter of 01/20/20 (from the past 24 hour(s))   CBC with platelets, differential   Result Value Ref Range    WBC 11.3 (H) 4.0 - 11.0 10e9/L    RBC Count 4.33 (L) 4.4 - 5.9 10e12/L    Hemoglobin 14.2 13.3 - 17.7 g/dL    Hematocrit 43.0 40.0 - 53.0 %    MCV 99 78 - 100 fl    MCH 32.8 26.5 - 33.0 pg    MCHC 33.0 31.5 - 36.5 g/dL    RDW 15.1 (H) 10.0 - 15.0 %    Platelet Count 228 150 - 450 10e9/L    Diff Method Manual Differential     % Neutrophils 45.0 %    % Lymphocytes 43.0 %    % Monocytes 9.0 %    % Eosinophils 3.0 %    % Basophils 0.0 %    Nucleated RBCs 1 (H) 0 /100    Absolute Neutrophil 5.1 1.6 - 8.3 10e9/L    Absolute Lymphocytes 4.9 0.8 - 5.3 10e9/L    Absolute Monocytes 1.0 0.0 - 1.3 10e9/L    Absolute Eosinophils 0.3 0.0 - 0.7 10e9/L    Absolute Basophils 0.0 0.0 - 0.2 10e9/L    Absolute Nucleated RBC 0.1     Polychromasia Slight     Platelet Estimate       Automated count confirmed.  Giant platelets are present.   INR   Result Value Ref Range    INR 3.55 (H) 0.86 - 1.14       Medications - No data to display    Assessments & Plan (with Medical Decision Making)  Chronic anticoagulation Coumadin secondary " to HIRAL childers, supratherapeutic INR 3.55, right epistaxis, no anterior site appreciated.  Right nasal balloon 7.5 cm no acute complications, bleeding controlled.  Hold Coumadin x2 days, half dose/1.25 mg on 1/22, recheck INR 1/23 as scheduled same day for nasal packing removal.  Return criteria reviewed.     I have reviewed the nursing notes.    I have reviewed the findings, diagnosis, plan and need for follow up with the patient.          Discharge Medication List as of 1/20/2020  9:47 PM          Final diagnoses:   Epistaxis   Chronic anticoagulation       1/20/2020   [facility] EMERGENCY DEPARTMENT     Rory Robles MD  01/20/20 2234       Rory Robles MD  01/20/20 2230

## 2020-01-21 NOTE — TELEPHONE ENCOUNTER
FreeStyle Berta 14 Days- Medicare Detailed Written Order.  Form placed on Provider Ruthann Alaniz desk for completion.  Form placed on Provider Paresh HERMAN desk for Signature.  Janie Orn Station Sec

## 2020-01-21 NOTE — DISCHARGE INSTRUCTIONS
Hold warfarin today and tomorrow, take half tablet on Wednesday 1/22    Follow-up Thursday 1/23 for recheck INR and pull nasal packing

## 2020-01-21 NOTE — PROGRESS NOTES
SUBJECTIVE:   Antoine Russo is a 74 year old male presenting with a chief complaint of   Chief Complaint   Patient presents with     Nose Bleeds     Since 4pm       He is an established patient of [facility].    Patient presents today with complaints of a nose bleed that started around 4 pm today (about 2.5 hours ago) and has been bleeding ever since. Patient denies trauma and states it just started randomly. Patient states did have an episode of bleeding 2 days ago as well. States has been getting then about every other day. Was seen in clinic on 1/4/2020 as well for nose bleed.       Review of Systems   Constitutional: Negative for fever.   HENT: Positive for nosebleeds. Negative for congestion, rhinorrhea and sore throat.    Eyes: Negative for visual disturbance.   Respiratory: Negative for cough.    Gastrointestinal: Negative for abdominal pain, nausea and vomiting.   Musculoskeletal: Positive for myalgias.   Skin: Negative for rash.   Neurological: Negative for dizziness, light-headedness and headaches.       Past Medical History:   Diagnosis Date     Acute kidney injury (H) 4/17/2019     Acute on chronic pancreatitis (H) 1/23/2018     Bacteriuria with pyuria 4/17/2019     C. difficile colitis      Colon polyp      Colon polyp 8/26/2011    Colonoscopy 8/2011-A sessile polyp was found in the cecum. The polyp was 6 mm in size. The polyp was removed with a hot snare. Resection and retrieval were complete. A sessile polyp was found in the proximal transverse colon. The polyp was 15 mm in size. The polyp was removed with a hot snare. Resection and retrieval were complete. A sessile polyp was found in the sigmoid colon. The polyp was 5 mm     Coronary artery disease      Diabetes mellitus (H)     type 2     Diverticulitis      Diverticulitis of colon 7/17/2007    Colitis on CT Scan 5/2011- [place] Colonoscopy 8/2011 Diverticulitis - Multiple small and large-mouthed diverticula were found in the mid sigmoid colon and at  the hepatic flexure. There was narrowing of the colon in association with the diverticular opening. Nena-diverticular erythema was seen. There was evidence of an impacted diverticulum. 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     Noninfectious ileitis      Recurrent pancreatitis      Family History   Problem Relation Age of Onset     Diabetes Sister         onset age 50     Alzheimer Disease Mother          80     Alzheimer Disease Father          85     Diabetes Other         nephew type 1     Diabetes Other      Aneurysm Sister      Anesthesia Reaction No family hx of      Colon Cancer No family hx of      Colon Polyps No family hx of      Crohn's Disease No family hx of      Ulcerative Colitis No family hx of      Current Outpatient Medications   Medication Sig Dispense Refill     ASPIRIN NOT PRESCRIBED (INTENTIONAL) Antiplatelet medication not prescribed intentionally due to Current anticoagulant therapy (warfarin/enoxaparin)       blood glucose (CONTOUR NEXT TEST) test strip Use to test blood sugar 2 times daily or as directed. 100 each 11     Continuous Blood Gluc  (FREESTYLE LISA 14 DAY READER) JOSE ALBERTO 1 each as needed (use to scan blood sugars from sensor) 1 Device 0     Continuous Blood Gluc Sensor (FREESTYLE LISA 14 DAY SENSOR) MISC 1 each every 14 days 2 each 11     gabapentin (NEURONTIN) 600 MG tablet Take 1 tablet (600 mg) by mouth 3 times daily Along with 300mg for a total of 900mg for bedtime dose 270 tablet 2     insulin degludec (TRESIBA) 200 UNIT/ML pen Take 78 units at bedtime (increase by 2 units every 4 days until am readings are under 120) max dose: 84. 18 mL 3     insulin glargine U-300 (TOUJEO) 300 UNIT/ML (1 units dial) pen Inject 78 Units Subcutaneous At Bedtime Order pen needles too. 9 mL 5     Insulin Lispro (HUMALOG KWIKPEN) 200 UNIT/ML soln Take  "24 units with breakfast, 20 lunch, take 25 units with supper. All plus the high sliding scale.  Max dose: 105 units 30 mL 3     insulin pen needle (BD LUIS U/F) 32G X 4 MM miscellaneous USE PEN NEEDLE ONCE DAILY AS DIRECTED 60 each 0     lisinopril (PRINIVIL/ZESTRIL) 5 MG tablet Take 1 tablet (5 mg) by mouth daily 90 tablet 3     metoprolol succinate ER (TOPROL-XL) 50 MG 24 hr tablet TAKE 1 & 1/2 (ONE & ONE-HALF) TABLETS BY MOUTH TWICE DAILY 270 tablet 3     multivitamin, therapeutic with minerals (THERA-VIT-M) TABS Take 1 tablet by mouth daily. 30 each 1     rosuvastatin (CRESTOR) 10 MG tablet Take 1 tablet (10 mg) by mouth daily 90 tablet 3     tamsulosin (FLOMAX) 0.4 MG capsule TAKE 1 CAPSULE BY MOUTH ONCE DAILY 90 capsule 0     vitamin B-12 (CYANOCOBALAMIN) 100 MCG tablet Take 100 mcg by mouth daily       warfarin ANTICOAGULANT (COUMADIN) 2.5 MG tablet Take 1.25 mg Mon/Thurs; 2.5 mg all other days or as directed by the Anticoagulation Clinic 100 tablet 0     Social History     Tobacco Use     Smoking status: Former Smoker     Packs/day: 1.00     Years: 40.00     Pack years: 40.00     Types: Cigarettes     Last attempt to quit: 2006     Years since quittin.1     Smokeless tobacco: Never Used   Substance Use Topics     Alcohol use: Not Currently     Comment: \"1 beer on Thanksgiving day\"       OBJECTIVE  /60 (BP Location: Right arm, Patient Position: Sitting, Cuff Size: Adult Large)   Pulse 70   Temp 97.1  F (36.2  C) (Tympanic)   Resp 18   Wt 97.5 kg (215 lb)   BMI 30.85 kg/m      Physical Exam  Vitals signs and nursing note reviewed.   Constitutional:       Appearance: Normal appearance. He is well-developed and well-groomed.   HENT:      Nose:      Right Nostril: Epistaxis present.      Left Nostril: No epistaxis.      Right Sinus: No maxillary sinus tenderness or frontal sinus tenderness.      Left Sinus: No maxillary sinus tenderness or frontal sinus tenderness.      Comments: Does have " packed with kleenex with bleeding around packing.   Cardiovascular:      Rate and Rhythm: Normal rate and regular rhythm.      Heart sounds: Normal heart sounds.   Pulmonary:      Effort: Pulmonary effort is normal.      Breath sounds: Normal breath sounds and air entry.   Skin:     General: Skin is warm and dry.   Neurological:      General: No focal deficit present.      Mental Status: He is alert and oriented to person, place, and time.      GCS: GCS eye subscore is 4. GCS verbal subscore is 5. GCS motor subscore is 6.   Psychiatric:         Behavior: Behavior is cooperative.           ASSESSMENT:      ICD-10-CM    1. Epistaxis R04.0    2. Chronic anticoagulation Z79.01         Medical Decision Making/PLAN:  Discussed with patient that unfortunately we do not have any rhino rockets or afrin nasal spray to help control the bleeding here in clinic. Since the bleeding is ongoing and bleeding around his current nasal packing recommend to present to the emergency department for further evaluation and treatment. He will go to the [facility] ED. ED was notified of patient transfer by private vehicle.     Followup:    Transfer to ED via private car    Patient Instructions   Present to the emergency department for further evaluation and treatment.

## 2020-01-21 NOTE — ED NOTES
"Pt comes in with nose bleed for the past 3 hours, has tissue stuffed up rt nares, refused nose clamp, states \"they don't work\" he is a/o x 4. No injury. Wife at bedside   "

## 2020-01-22 ENCOUNTER — HOSPITAL ENCOUNTER (EMERGENCY)
Facility: CLINIC | Age: 75
Discharge: HOME | End: 2020-01-23
Attending: EMERGENCY MEDICINE | Admitting: EMERGENCY MEDICINE
Payer: COMMERCIAL

## 2020-01-22 VITALS
BODY MASS INDEX: 30.13 KG/M2 | RESPIRATION RATE: 20 BRPM | WEIGHT: 210 LBS | SYSTOLIC BLOOD PRESSURE: 161 MMHG | DIASTOLIC BLOOD PRESSURE: 86 MMHG | HEART RATE: 107 BPM | TEMPERATURE: 98.9 F | OXYGEN SATURATION: 92 %

## 2020-01-22 DIAGNOSIS — J06.9 UPPER RESPIRATORY TRACT INFECTION, UNSPECIFIED TYPE: ICD-10-CM

## 2020-01-22 DIAGNOSIS — Z48.00 ENCOUNTER FOR REMOVAL OF NASAL PACKING: ICD-10-CM

## 2020-01-22 DIAGNOSIS — G62.9 PERIPHERAL POLYNEUROPATHY: ICD-10-CM

## 2020-01-22 PROCEDURE — 99282 EMERGENCY DEPT VISIT SF MDM: CPT | Mod: Z6 | Performed by: EMERGENCY MEDICINE

## 2020-01-22 PROCEDURE — 99283 EMERGENCY DEPT VISIT LOW MDM: CPT | Performed by: EMERGENCY MEDICINE

## 2020-01-22 RX ORDER — GABAPENTIN 600 MG/1
600 TABLET ORAL 3 TIMES DAILY
Qty: 270 TABLET | Refills: 2 | Status: SHIPPED | OUTPATIENT
Start: 2020-01-22 | End: 2020-05-12

## 2020-01-22 NOTE — TELEPHONE ENCOUNTER
Requested Prescriptions   Pending Prescriptions Disp Refills     gabapentin (NEURONTIN) 600 MG tablet 270 tablet 2     Sig: Take 1 tablet (600 mg) by mouth 3 times daily Along with 300mg for a total of 900mg for bedtime dose       There is no refill protocol information for this order        Last Written Prescription Date:    Last Fill Quantity: ,  # refills:    Last office visit: 10/15/2019 with prescribing provider:  Reshma   Future Office Visit:   Next 5 appointments (look out 90 days)    Jan 23, 2020  8:40 AM CST  SHORT with Brianna Nunez NP  [facility] ([facility]) [address] [place] [id number]  [phone]   Feb 04, 2020  8:40 AM CST  Office Visit with Katie Martino MD  [facility] ([facility]) [address] [place] [phone]  [phone]   Apr 15, 2020  9:00 AM CDT  Return Visit with Anais Justice MD  [facility] ([facility]) [address] [place] [phone]  [phone]

## 2020-01-22 NOTE — ED AVS SNAPSHOT
[facility]  [address] [facility] [address]  Phone:  [phone]  Fax:  [phone]                                    Antoine Russo   MRN: [id number]    Department:  [facility]   Date of Visit:  1/22/2020           After Visit Summary Signature Page    I have received my discharge instructions, and my questions have been answered. I have discussed any challenges I see with this plan with the nurse or doctor.    ..........................................................................................................................................  Patient/Patient Representative Signature      ..........................................................................................................................................  Patient Representative Print Name and Relationship to Patient    ..................................................               ................................................  Date                                   Time    ..........................................................................................................................................  Reviewed by Signature/Title    ...................................................              ..............................................  Date                                               Time          22EPIC Rev 08/18

## 2020-01-23 ENCOUNTER — ANTICOAGULATION THERAPY VISIT (OUTPATIENT)
Dept: ANTICOAGULATION | Facility: CLINIC | Age: 75
End: 2020-01-23
Payer: COMMERCIAL

## 2020-01-23 DIAGNOSIS — I48.20 CHRONIC ATRIAL FIBRILLATION (H): ICD-10-CM

## 2020-01-23 DIAGNOSIS — Z79.01 LONG TERM CURRENT USE OF ANTICOAGULANT THERAPY: ICD-10-CM

## 2020-01-23 LAB — INR POINT OF CARE: 1.5 (ref 0.86–1.14)

## 2020-01-23 PROCEDURE — 99207 ZZC NO CHARGE NURSE ONLY: CPT

## 2020-01-23 PROCEDURE — 36416 COLLJ CAPILLARY BLOOD SPEC: CPT

## 2020-01-23 PROCEDURE — 85610 PROTHROMBIN TIME: CPT | Mod: QW

## 2020-01-23 NOTE — PROGRESS NOTES
ADDENDUM: Anticoag Chart Review following ED visit.  Visit date(s): 1/24/20 (note that [facility] was not alerted to this discharge until 1/28/20)    Reason for visit: UTI, Dehydration    New medications: Keflex 500 mg QID for 7 days    Next INR check date: 1/30/20   This recheck date is an appropriate timeframe given the changes noted during the ED/hospitalization.    Maria Victoria Castaneda RN 01/28/20 at 10:46 AM        ANTICOAGULATION FOLLOW-UP CLINIC VISIT    Patient Name:  Antoine Russo  Date:  1/23/2020  Contact Type:  Face to Face    SUBJECTIVE:  Patient Findings     Positives:   Signs/symptoms of bleeding (recent epistaxis and nasal balloon placed), Missed doses (intentional holds per ED)    Comments:   Patient was at ED at midnight last night having his nasal balloon removed. His dosing the last 3 days came from the ED visit on Monday of this week. INR now sub therapeutic. Patient will have 15 mg/week by next INR check in 1 week. This was the maintenance dosing we were trying to get too before his epistaxis incident. This is a ~14% reduction from prior dosing when he was supra therapeutic. No other changes or concerns. Patient denies any increase in alcohol use.         Clinical Outcomes     Positives:   Anticoagulation-related ED visit    Comments:   Patient was at ED at midnight last night having his nasal balloon removed. His dosing the last 3 days came from the ED visit on Monday of this week. INR now sub therapeutic. Patient will have 15 mg/week by next INR check in 1 week. This was the maintenance dosing we were trying to get too before his epistaxis incident. This is a ~14% reduction from prior dosing when he was supra therapeutic. No other changes or concerns. Patient denies any increase in alcohol use.            OBJECTIVE    INR Protime   Date Value Ref Range Status   01/23/2020 1.5 (A) 0.86 - 1.14 Final       ASSESSMENT / PLAN  INR assessment SUB    Recheck INR In: 1 WEEK    INR Location Clinic       Anticoagulation Summary  As of 2020    INR goal:   2.0-3.0   TTR:   56.5 % (10.5 mo)   INR used for dosin.5! (2020)   Warfarin maintenance plan:   1.25 mg (2.5 mg x 0.5) every Mon, Thu; 2.5 mg (2.5 mg x 1) all other days   Full warfarin instructions:   : 2.5 mg; : 1.25 mg; : 2.5 mg; : 1.25 mg; Otherwise 1.25 mg every Mon, Thu; 2.5 mg all other days   Weekly warfarin total:   15 mg   Plan last modified:   Aparna Kunz RN (2020)   Next INR check:   2020   Priority:   High   Target end date:   Indefinite    Indications    Chronic atrial fibrillation [I48.20]  Long term current use of anticoagulant therapy [Z79.01]             Anticoagulation Episode Summary     INR check location:       Preferred lab:       Send INR reminders to:   [facility]    Comments:   * 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 No bandaid. 2-3 light [facility] daily      Anticoagulation Care Providers     Provider Role Specialty Phone number    Katie Martino MD [facility] [phone]            See the Encounter Report to view Anticoagulation Flowsheet and Dosing Calendar (Go to Encounters tab in chart review, and find the Anticoagulation Therapy Visit)        Aparna Kunz RN

## 2020-01-23 NOTE — ED TRIAGE NOTES
Pt had nose bleed Monday has nasal packing in right nare, developed URI s/s yesterday, nasal congestion, denies cough, no fevers. LS clear.

## 2020-01-23 NOTE — DISCHARGE INSTRUCTIONS
Return if symptoms worsen or new symptoms develop.  Drink plenty of fluids.  Avoid blowing nose as much possible.  If any further nasal bleeding worsening congestion fevers not controlled with ibuprofen or Tylenol or other symptoms present please return for further evaluation and care.

## 2020-01-24 ENCOUNTER — HOSPITAL ENCOUNTER (EMERGENCY)
Facility: CLINIC | Age: 75
Discharge: HOME | End: 2020-01-25
Attending: EMERGENCY MEDICINE | Admitting: EMERGENCY MEDICINE
Payer: COMMERCIAL

## 2020-01-24 DIAGNOSIS — E86.0 DEHYDRATION: ICD-10-CM

## 2020-01-24 DIAGNOSIS — A49.9 UTI (URINARY TRACT INFECTION), BACTERIAL: ICD-10-CM

## 2020-01-24 DIAGNOSIS — G62.9 PERIPHERAL POLYNEUROPATHY: ICD-10-CM

## 2020-01-24 DIAGNOSIS — N39.0 UTI (URINARY TRACT INFECTION), BACTERIAL: ICD-10-CM

## 2020-01-24 PROCEDURE — 99284 EMERGENCY DEPT VISIT MOD MDM: CPT | Mod: Z6 | Performed by: EMERGENCY MEDICINE

## 2020-01-24 PROCEDURE — 99283 EMERGENCY DEPT VISIT LOW MDM: CPT | Mod: 25 | Performed by: EMERGENCY MEDICINE

## 2020-01-24 ASSESSMENT — MIFFLIN-ST. JEOR: SCORE: 1708.8

## 2020-01-24 NOTE — TELEPHONE ENCOUNTER
Requested Prescriptions   Pending Prescriptions Disp Refills     gabapentin (NEURONTIN) 300 MG capsule 90 capsule 2     Sig: Take 1 capsule (300 mg) by mouth At Bedtime 1 tablet along with his 600mg dose at bedtime. Total gabapentin dose is 600mg/600mg/900mg.       There is no refill protocol information for this order        Last Written Prescription Date:    Last Fill Quantity: ,  # refills:    Last office visit: 10/15/2019 with prescribing provider:  Reshma   Future Office Visit:   Next 5 appointments (look out 90 days)    Feb 04, 2020  8:40 AM CST  Office Visit with Katie [facility]ino MD  [facility] [address] [place] [phone]  [phone]   Apr 15, 2020  9:00 AM CDT  Return Visit with Anais Justice MD  [facility] ([facility]) [address] [facility] [address]  [phone]

## 2020-01-25 VITALS
DIASTOLIC BLOOD PRESSURE: 78 MMHG | BODY MASS INDEX: 30.06 KG/M2 | OXYGEN SATURATION: 93 % | HEIGHT: 70 IN | RESPIRATION RATE: 20 BRPM | HEART RATE: 107 BPM | SYSTOLIC BLOOD PRESSURE: 164 MMHG | WEIGHT: 210 LBS | TEMPERATURE: 98.1 F

## 2020-01-25 LAB
ALBUMIN SERPL-MCNC: 2.9 G/DL (ref 3.4–5)
ALBUMIN UR-MCNC: 100 MG/DL
ALP SERPL-CCNC: 55 U/L (ref 40–150)
ALT SERPL W P-5'-P-CCNC: 25 U/L (ref 0–70)
ANION GAP SERPL CALCULATED.3IONS-SCNC: 9 MMOL/L (ref 3–14)
APPEARANCE UR: ABNORMAL
AST SERPL W P-5'-P-CCNC: 32 U/L (ref 0–45)
BASOPHILS # BLD AUTO: 0 10E9/L (ref 0–0.2)
BASOPHILS NFR BLD AUTO: 0.2 %
BILIRUB SERPL-MCNC: 0.7 MG/DL (ref 0.2–1.3)
BILIRUB UR QL STRIP: NEGATIVE
BUN SERPL-MCNC: 22 MG/DL (ref 7–30)
CALCIUM SERPL-MCNC: 9.3 MG/DL (ref 8.5–10.1)
CHLORIDE SERPL-SCNC: 105 MMOL/L (ref 94–109)
CO2 SERPL-SCNC: 22 MMOL/L (ref 20–32)
COLOR UR AUTO: YELLOW
CREAT SERPL-MCNC: 0.98 MG/DL (ref 0.66–1.25)
DIFFERENTIAL METHOD BLD: ABNORMAL
EOSINOPHIL # BLD AUTO: 0 10E9/L (ref 0–0.7)
EOSINOPHIL NFR BLD AUTO: 0.1 %
ERYTHROCYTE [DISTWIDTH] IN BLOOD BY AUTOMATED COUNT: 15 % (ref 10–15)
GFR SERPL CREATININE-BSD FRML MDRD: 76 ML/MIN/{1.73_M2}
GLUCOSE SERPL-MCNC: 101 MG/DL (ref 70–99)
GLUCOSE UR STRIP-MCNC: NEGATIVE MG/DL
HCT VFR BLD AUTO: 42 % (ref 40–53)
HGB BLD-MCNC: 13.6 G/DL (ref 13.3–17.7)
HGB UR QL STRIP: ABNORMAL
IMM GRANULOCYTES # BLD: 0.1 10E9/L (ref 0–0.4)
IMM GRANULOCYTES NFR BLD: 0.6 %
KETONES UR STRIP-MCNC: 20 MG/DL
LACTATE BLD-SCNC: 0.9 MMOL/L (ref 0.7–2)
LEUKOCYTE ESTERASE UR QL STRIP: ABNORMAL
LYMPHOCYTES # BLD AUTO: 2 10E9/L (ref 0.8–5.3)
LYMPHOCYTES NFR BLD AUTO: 12.5 %
MCH RBC QN AUTO: 32.4 PG (ref 26.5–33)
MCHC RBC AUTO-ENTMCNC: 32.4 G/DL (ref 31.5–36.5)
MCV RBC AUTO: 100 FL (ref 78–100)
MONOCYTES # BLD AUTO: 1.2 10E9/L (ref 0–1.3)
MONOCYTES NFR BLD AUTO: 7.6 %
MUCOUS THREADS #/AREA URNS LPF: PRESENT /LPF
NEUTROPHILS # BLD AUTO: 12.9 10E9/L (ref 1.6–8.3)
NEUTROPHILS NFR BLD AUTO: 79 %
NITRATE UR QL: POSITIVE
NRBC # BLD AUTO: 0 10*3/UL
NRBC BLD AUTO-RTO: 0 /100
PH UR STRIP: 5 PH (ref 5–7)
PLATELET # BLD AUTO: 226 10E9/L (ref 150–450)
POTASSIUM SERPL-SCNC: 4.8 MMOL/L (ref 3.4–5.3)
PROT SERPL-MCNC: 7.6 G/DL (ref 6.8–8.8)
RBC # BLD AUTO: 4.2 10E12/L (ref 4.4–5.9)
RBC #/AREA URNS AUTO: 30 /HPF (ref 0–2)
SODIUM SERPL-SCNC: 136 MMOL/L (ref 133–144)
SOURCE: ABNORMAL
SP GR UR STRIP: 1.01 (ref 1–1.03)
UROBILINOGEN UR STRIP-MCNC: 0 MG/DL (ref 0–2)
WBC # BLD AUTO: 16.3 10E9/L (ref 4–11)
WBC #/AREA URNS AUTO: >182 /HPF (ref 0–5)
WBC CLUMPS #/AREA URNS HPF: PRESENT /HPF

## 2020-01-25 PROCEDURE — 80053 COMPREHEN METABOLIC PANEL: CPT | Performed by: EMERGENCY MEDICINE

## 2020-01-25 PROCEDURE — 96360 HYDRATION IV INFUSION INIT: CPT | Performed by: EMERGENCY MEDICINE

## 2020-01-25 PROCEDURE — 87186 SC STD MICRODIL/AGAR DIL: CPT | Performed by: EMERGENCY MEDICINE

## 2020-01-25 PROCEDURE — 87088 URINE BACTERIA CULTURE: CPT | Performed by: EMERGENCY MEDICINE

## 2020-01-25 PROCEDURE — 25000132 ZZH RX MED GY IP 250 OP 250 PS 637: Performed by: EMERGENCY MEDICINE

## 2020-01-25 PROCEDURE — 81001 URINALYSIS AUTO W/SCOPE: CPT | Performed by: EMERGENCY MEDICINE

## 2020-01-25 PROCEDURE — 25800030 ZZH RX IP 258 OP 636: Performed by: EMERGENCY MEDICINE

## 2020-01-25 PROCEDURE — 96361 HYDRATE IV INFUSION ADD-ON: CPT | Performed by: EMERGENCY MEDICINE

## 2020-01-25 PROCEDURE — 87086 URINE CULTURE/COLONY COUNT: CPT | Performed by: EMERGENCY MEDICINE

## 2020-01-25 PROCEDURE — 85025 COMPLETE CBC W/AUTO DIFF WBC: CPT | Performed by: EMERGENCY MEDICINE

## 2020-01-25 PROCEDURE — 83605 ASSAY OF LACTIC ACID: CPT | Performed by: EMERGENCY MEDICINE

## 2020-01-25 RX ORDER — CEPHALEXIN 500 MG/1
500 CAPSULE ORAL ONCE
Qty: 1 CAPSULE | Refills: 0 | Status: SHIPPED | OUTPATIENT
Start: 2020-01-25 | End: 2020-02-04

## 2020-01-25 RX ORDER — CEPHALEXIN 500 MG/1
500 CAPSULE ORAL 4 TIMES DAILY
Qty: 26 CAPSULE | Refills: 0 | Status: SHIPPED | OUTPATIENT
Start: 2020-01-25 | End: 2020-02-04

## 2020-01-25 RX ORDER — CEPHALEXIN 500 MG/1
500 CAPSULE ORAL ONCE
Status: COMPLETED | OUTPATIENT
Start: 2020-01-25 | End: 2020-01-25

## 2020-01-25 RX ORDER — CEPHALEXIN 500 MG/1
500 CAPSULE ORAL ONCE
Status: DISCONTINUED | OUTPATIENT
Start: 2020-01-25 | End: 2020-01-25 | Stop reason: HOSPADM

## 2020-01-25 RX ADMIN — SODIUM CHLORIDE, POTASSIUM CHLORIDE, SODIUM LACTATE AND CALCIUM CHLORIDE 1000 ML: 600; 310; 30; 20 INJECTION, SOLUTION INTRAVENOUS at 00:19

## 2020-01-25 RX ADMIN — CEPHALEXIN 500 MG: 500 CAPSULE ORAL at 01:50

## 2020-01-25 RX ADMIN — SODIUM CHLORIDE, POTASSIUM CHLORIDE, SODIUM LACTATE AND CALCIUM CHLORIDE 1000 ML: 600; 310; 30; 20 INJECTION, SOLUTION INTRAVENOUS at 01:49

## 2020-01-25 ASSESSMENT — ENCOUNTER SYMPTOMS
ABDOMINAL PAIN: 0
LIGHT-HEADEDNESS: 0
DIARRHEA: 1
FEVER: 0
HEMATURIA: 0
VOMITING: 0
HEADACHES: 0
SHORTNESS OF BREATH: 0
NAUSEA: 0
FATIGUE: 0
APPETITE CHANGE: 0
BACK PAIN: 0
CHEST TIGHTNESS: 0
RHINORRHEA: 0
CHILLS: 0
WEAKNESS: 0
FREQUENCY: 1
DYSURIA: 1

## 2020-01-25 NOTE — ED NOTES
Patient here with c/o urinary retention, more pronounced over 4 days. Has been on flomax for past 5 years & was doing well up to this point. Has periods where he can only void a few mls & then ends up with incontinence. Hasn't had prostate checked in years, states there is some burning with voiding but denies blood in urine or flank pain. Bladder scanned for 37mls & just voided PTA.

## 2020-01-25 NOTE — ED PROVIDER NOTES
History     Chief Complaint   Patient presents with     Nasal Congestion     HPI  Antoine Russo is a 74 year old male who presents the emergency department complaining of nasal congestion.  Patient had a bloody nose 2 days ago and had packing placed in his right nare at that time.  Patient states he has been doing well since then but tonight has developed increased congestion to the point where when he lies down he is having trouble breathing with the packing in.  He denies any fever he has not had any chills.  He is not short of breath with sitting up and has not had a focal numbness weakness any extremity.  He denies any significant pain.  He has not had nausea or vomiting.    Allergies:  Allergies   Allergen Reactions     Nkda [No Known Drug Allergies]        Problem List:    Patient Active Problem List    Diagnosis Date Noted     Anticipated difficulty with intubation 05/23/2017     Priority: High     Class: Chronic     Difficult two hands mask ventilation, intubated multiple times asleep with video laryngoscope. H/o tongue cancer surgery.        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 Please refer if Care Coordination services are needed.    Presenting Problem Signs and Symptoms Treatment Plan   Questions or concerns   during clinic hours   I will call my clinic directly:  [address], [place] [address]  [phone].   Questions or concerns outside clinic hours   I will call the 24 hour nurse line at   [phone] or [facility].   Need to schedule an appointment   I will call the 24 hour scheduling team at [phone] or my clinic directly at [phone].   Same day treatment     I will call my clinic first, nurse line if after hours, urgent care and express care if needed.   Clinic care coordination services (regular clinic hours)   I will call a clinic care coordinator directly:     Shelia Emanuel RN [facility]  [phone]    Brianna Cristobal :    [phone]    Or call my clinic at [phone] and ask to speak with care coordination.   Crisis Services: Behavioral or Mental Health  [facility] 24 Hour Phone Line  [phone]    [facility] 24 Hour Crisis Services  [phone]    [facility] (Behavioral Health Providers) Network [phone]    [facility]   [phone]     Emergency treatment -- Immediately    [facility] 911                Clostridium difficile enterocolitis 12/18/2012     Priority: Medium     Groin fluid collection 12/15/2012     Priority: Medium     CT 12/12- New (since 5/11) collection measuring at least 2.3 cm in diameter and 6.5 cm in length within the right inguinal canal. 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 Gerson Ravi  and collegues at the  in 2012  NAD 2017  CT scan of the neck every 2-3 years.  - Thyroid labs yearly.  - Carotid ultrasound in three to four years to evaluate for stenosis.       Hyperlipidemia LDL goal <100 10/31/2010     Priority: Medium     CAD (coronary artery disease) 05/26/2009     Priority: Medium     Stress testing 2009 showed inferior wall ischemia.  Cath preformed; identified moderate CAD with stenosis of 40-50% in LAD and RCA.  No stents were placed.  Echo in 01/2018 (done while in Afib with rates of 100-110); EF of 55-60%.  No RWMA.       GERD (gastroesophageal reflux disease) 05/26/2009     Priority: Medium     Hypertrophy of breast 09/25/2007     Priority: Medium     PERS HX TOBACCO USE - quit in 11/06 with chantix 03/15/2007     Priority: Medium     Hypertension, benign essential, goal below 140/90 11/07/2005     Priority: Medium     Patient has only fair bp control and with family history of diabetes will all ace if lab indicated also has bph and may op for psa and not digital exam next yr        Pain in joint, shoulder region 11/07/2005     Priority: Medium     Hypertrophy of prostate without urinary obstruction 11/07/2005     Priority: Medium     Problem list name updated by automated process. 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 difficile colitis      Colon polyp      Colon polyp 8/26/2011     Coronary artery disease      Diabetes mellitus (H)      Diverticulitis      Diverticulitis of colon 7/17/2007     Hypertension      Leukocytosis 4/17/2019     Malignant neoplasm (H)      Noninfectious ileitis      Recurrent pancreatitis        Past Surgical History:    Past Surgical History:   Procedure Laterality Date     BREAST SURGERY  2008    right breast  mass benign     COLONOSCOPY      multiple polyps removed     COLONOSCOPY  8/24/2011    Procedure:COMBINED COLONOSCOPY, REMOVE TUMOR/POLYP/LESION BY SNARE; Surgeon:MILEY ARBOLEDA; Location:[facility]     COLONOSCOPY  12/17/2012    Procedure: COLONOSCOPY;;  Surgeon: Leon Maurer MD;  [facility]     COLONOSCOPY  12/18/2012    Procedure: COLONOSCOPY;;  Surgeon: Leon Maurer MD;  [facility]     DENERVATION OF SPERMATIC CORD MICROSURGICAL Left 5/23/2017    Procedure: DENERVATION OF SPERMATIC CORD MICROSURGICAL;;  Surgeon: Marcio Aggarwal MD;  [facility]     DISSECTION RADICAL NECK BILATERAL  8/2/2012    Procedure: DISSECTION RADICAL NECK BILATERAL;;  Surgeon: Yung Alvares MD;  [facility]     ENDOSCOPIC RETROGRADE CHOLANGIOPANCREATOGRAM N/A 5/10/2016    Procedure: COMBINED ENDOSCOPIC RETROGRADE CHOLANGIOPANCREATOGRAPHY, PLACE TUBE/STENT;  Surgeon: Yovanny Beasley MD;  [facility]     ENDOSCOPIC RETROGRADE CHOLANGIOPANCREATOGRAM N/A 3/29/2018    Procedure: ENDOSCOPIC RETROGRADE CHOLANGIOPANCREATOGRAM;  Endoscopic Retrograde Cholangiopancreatogram, Endoscopic Ultrasound, Biliary Sphincterotomy, Biliary and Pancreatic Stent Placement;  Surgeon: Yovanny Beasley MD;  [facility]     ENDOSCOPIC ULTRASOUND UPPER GASTROINTESTINAL TRACT (GI) N/A 2/3/2016    Procedure: ENDOSCOPIC ULTRASOUND, ESOPHAGOSCOPY / UPPER GASTROINTESTINAL TRACT (GI);  Surgeon: Grabiel Plata MD;  [facility]     ENDOSCOPIC ULTRASOUND UPPER GASTROINTESTINAL TRACT (GI) N/A 3/29/2018    Procedure: ENDOSCOPIC ULTRASOUND, ESOPHAGOSCOPY / UPPER GASTROINTESTINAL TRACT (GI);;  Surgeon: Grabiel Plata MD;  [facility]     ESOPHAGOSCOPY, GASTROSCOPY, DUODENOSCOPY (EGD), COMBINED N/A 2/3/2016    Procedure: COMBINED ENDOSCOPIC ULTRASOUND, ESOPHAGOSCOPY, GASTROSCOPY, DUODENOSCOPY (EGD), FINE NEEDLE ASPIRATE/BIOPSY;  Surgeon: Grabiel Plata MD;  [facility]      ESOPHAGOSCOPY, GASTROSCOPY, DUODENOSCOPY (EGD), COMBINED N/A 6/8/2016    Procedure: COMBINED ESOPHAGOSCOPY, GASTROSCOPY, DUODENOSCOPY (EGD), REMOVE FOREIGN BODY;  Surgeon: Yovanny Beasley MD;  [facility]     ESOPHAGOSCOPY, GASTROSCOPY, DUODENOSCOPY (EGD), COMBINED N/A 4/17/2018    Procedure: COMBINED ESOPHAGOSCOPY, GASTROSCOPY, DUODENOSCOPY (EGD), REMOVE FOREIGN BODY;  EGD with stent removal;  Surgeon: Grabiel Plata MD;  [facility]     EXCISE LESION INTRAORAL  6/14/2012    Procedure: EXCISE LESION INTRAORAL;  Wide Local Excision Floor of Mouth, Direct Laryngoscopy, Bilateral Meagher's Marsuplization, Split Thickness Skin Graft from right Thigh  Latex Safe;  Surgeon: Gerson Ravi MD;  [facility]     EXCISE LESION INTRAORAL  8/2/2012    Procedure: EXCISE LESION INTRAORAL;  Floor of Mouth Resection, Bilateral Selective Radical Neck Dissection, Tracheostomy, Left Radial Forearm  Free Flap with Alloderm, Nasogastric Feeding Tube Placement,    * Latex Safe*;  Surgeon: Gerson Ravi MD;  [facility]     EXCISE LESION INTRAORAL  12/11/2012    Procedure: EXCISE LESION INTRAORAL;  takedown of oral flap;  Surgeon: Yung Alvares MD;  [facility]     GRAFT FREE VASCULARIZED (LOCATION)  8/2/2012    Procedure: GRAFT FREE VASCULARIZED (LOCATION);;  Surgeon: Yung Alvares MD;  [facility]     GRAFT SKIN SPLIT THICKNESS FROM EXTREMITY  6/14/2012    Procedure: GRAFT SKIN SPLIT THICKNESS FROM EXTREMITY;;  Surgeon: Gerson Ravi MD;  [facility]     LAPAROSCOPIC ILEOSTOMY TAKEDOWN  6/6/2013    Procedure: LAPAROSCOPIC ILEOSTOMY TAKEDOWN;  Laparoscopic Closure of Enterostomy, Guerda's Type with IleoRectal Anastomosis ;  Surgeon: Grabiel Riddle MD;  [facility]     LAPAROTOMY EXPLORATORY  12/20/2012    Procedure: LAPAROTOMY EXPLORATORY;  Exploratory Laparotomy, total abdominal colectomy, ileostomy formation;  Surgeon: Miquel Cannon MD;  [facility]      "LARYNGOSCOPY  2012    Procedure: LARYNGOSCOPY;;  Surgeon: Gerson Ravi MD;  [facility]     ORTHOPEDIC SURGERY      ganglian cyst left ankle     PANCREATECTOMY, SPLENECTOMY N/A 3/10/2016    Procedure: PANCREATECTOMY, SPLENECTOMY;  Surgeon: Nael Abel MD;  [facility]     SHOULDER SURGERY  ,     2006- right rotator cuff,  bone spur on left. Dr. Hdez     VARICOCELECTOMY Left 2017    Procedure: VARICOCELECTOMY;  Left Varicocele Repair, Denervation of Left Testis;  Surgeon: Marcio Aggarwal MD;  [facility]       Family History:    Family History   Problem Relation Age of Onset     Diabetes Sister         onset age 50     Alzheimer Disease Mother          80     Alzheimer Disease Father          85     Diabetes Other         nephew type 1     Diabetes Other      Aneurysm Sister      Anesthesia Reaction No family hx of      Colon Cancer No family hx of      Colon Polyps No family hx of      Crohn's Disease No family hx of      Ulcerative Colitis No family hx of        Social History:  Marital Status:   [2]  Social History     Tobacco Use     Smoking status: Former Smoker     Packs/day: 1.00     Years: 40.00     Pack years: 40.00     Types: Cigarettes     Last attempt to quit: 2006     Years since quittin.1     Smokeless tobacco: Never Used   Substance Use Topics     Alcohol use: Not Currently     Comment: \"1 beer on Thanksgiving day\"     Drug use: No        Medications:    ASPIRIN NOT PRESCRIBED (INTENTIONAL)  blood glucose (CONTOUR NEXT TEST) test strip  cephALEXin (KEFLEX) 500 MG capsule  cephALEXin (KEFLEX) 500 MG capsule  Continuous Blood Gluc  (FREESTYLE LISA 14 DAY READER) JOSE ALBERTO  Continuous Blood Gluc Sensor (FREESTYLE LISA 14 DAY SENSOR) MISC  gabapentin (NEURONTIN) 600 MG tablet  insulin degludec (TRESIBA) 200 UNIT/ML pen  insulin glargine U-300 (TOUJEO) 300 UNIT/ML (1 units dial) pen  Insulin Lispro (HUMALOG KWIKPEN) 200 UNIT/ML " soln  insulin pen needle (BD LUIS U/F) 32G X 4 MM miscellaneous  lisinopril (PRINIVIL/ZESTRIL) 5 MG tablet  metoprolol succinate ER (TOPROL-XL) 50 MG 24 hr tablet  multivitamin, therapeutic with minerals (THERA-VIT-M) TABS  rosuvastatin (CRESTOR) 10 MG tablet  tamsulosin (FLOMAX) 0.4 MG capsule  vitamin B-12 (CYANOCOBALAMIN) 100 MCG tablet  warfarin ANTICOAGULANT (COUMADIN) 2.5 MG tablet          Review of Systems  As per HPI.  Physical Exam   BP: (!) 161/86  Pulse: 107  Temp: 98.9  F (37.2  C)  Resp: 20  Weight: 95.3 kg (210 lb)  SpO2: 92 %      Physical Exam  Vitals signs and nursing note reviewed.   Constitutional:       General: He is not in acute distress.     Appearance: He is not ill-appearing or toxic-appearing.   HENT:      Head: Normocephalic.      Nose:      Comments: There is a Rhino Rocket noted in patient's right nare.  No discharge.  Oral mucosa moist posterior pharynx without erythema edema.  Eyes:      Conjunctiva/sclera: Conjunctivae normal.   Neck:      Musculoskeletal: Normal range of motion and neck supple.   Cardiovascular:      Rate and Rhythm: Normal rate and regular rhythm.      Pulses: Normal pulses.      Heart sounds: Normal heart sounds. No murmur.   Pulmonary:      Effort: Pulmonary effort is normal.      Breath sounds: No stridor. No wheezing, rhonchi or rales.   Chest:      Chest wall: No tenderness.   Musculoskeletal: Normal range of motion.   Skin:     General: Skin is warm and dry.      Findings: No rash.   Neurological:      General: No focal deficit present.      Mental Status: He is alert.      Motor: No weakness.      Coordination: Coordination normal.   Psychiatric:         Mood and Affect: Mood normal.         ED Course        Procedures               Critical Care time:  none               No results found for this or any previous visit (from the past 24 hour(s)).    Medications - No data to display    Assessments & Plan (with Medical Decision Making) records were reviewed.   Risks of pulling the packing was discussed with the patient and he was comfortable with removing the packing.  The Rhino Rocket was deflated and removed.  Patient felt better after the removal and was observed for almost an hour where he was able to lie flat and breathe without difficulty his lungs were clear he was feeling much better and felt comfortable going home at this time.  He will return if any worsening symptoms.     I have reviewed the nursing notes.    I have reviewed the findings, diagnosis, plan and need for follow up with the patient.       Discharge Medication List as of 1/23/2020 12:50 AM          Final diagnoses:   Upper respiratory tract infection, unspecified type   Encounter for removal of nasal packing       1/22/2020   [facility] EMERGENCY DEPARTMENT     Catrachito Acosta MD  01/25/20 0959

## 2020-01-25 NOTE — DISCHARGE INSTRUCTIONS
Be sure to drink plenty of water.  You should be urinating clear urine and a reasonable amount of urine every 2-4 hours while awake.  If your symptoms are not improving or if you develop any new or concerning symptoms, please return to the emergency department as soon as possible for a repeat evaluation

## 2020-01-25 NOTE — ED PROVIDER NOTES
History     Chief Complaint   Patient presents with     Urinary Retention     HPI  Antoine Russo is a 74 year old male with history of coronary disease, hypertension, type 2 diabetes, atrial fibrillation on Coumadin, and previous history of acute kidney injury presenting for evaluation of urinary frequency with some mild dysuria.  Patient reports over the past 2 to 3 days he has been peeing very small amount once or twice an hour with some very mild discomfort while urinating.  Denies fever chills.  Denies flank pain.  Denies previous similar symptoms in the past.  Patient reports he is been drinking a normal amount of fluid and does not feel dry or dehydrated.    Allergies:  Allergies   Allergen Reactions     Nkda [No Known Drug Allergies]        Problem List:    Patient Active Problem List    Diagnosis Date Noted     Anticipated difficulty with intubation 05/23/2017     Priority: High     Class: Chronic     Difficult two hands mask ventilation, intubated multiple times asleep with video laryngoscope. H/o tongue cancer surgery.        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 Please  refer if Care Coordination services are needed.    Presenting Problem Signs and Symptoms Treatment Plan   Questions or concerns   during clinic hours   I will call my clinic directly:  [address], [place] [address]  [phone].   Questions or concerns outside clinic hours   I will call the 24 hour nurse line at   [phone] or [phone].   Need to schedule an appointment   I will call the 24 hour scheduling team at [phone] or my clinic directly at [phone].   Same day treatment     I will call my clinic first, nurse line if after hours, urgent care and express care if needed.   Clinic care coordination services (regular clinic hours)   I will call a clinic care coordinator directly:     Shelia Emanuel RN [facility]  [phone]    JAY Estes:    [phone]    Or call my clinic at [phone] and ask to speak with care coordination.   Crisis Services: Behavioral or Mental Health  [facility]is Connection 24 Hour Phone Line  [phone]    [facility] ([facility]] 24 Hour Crisis Services  [phone]    [facility] (Behavioral Health Providers) Network [phone]    [facility]   [phone]     Emergency treatment -- Immediately    [facility]All 911                Clostridium difficile enterocolitis 12/18/2012     Priority: Medium     Groin fluid collection 12/15/2012     Priority: Medium     CT 12/12- New (since 5/11) collection measuring at least 2.3 cm in diameter and 6.5 cm in length within the right inguinal canal. 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  NAD 2017  CT scan of the neck every 2-3 years.  - Thyroid labs yearly.  - Carotid ultrasound in three to four years to evaluate for stenosis.       Hyperlipidemia LDL goal <100 10/31/2010     Priority: Medium     CAD (coronary artery disease) 05/26/2009     Priority: Medium     Stress testing 2009 showed inferior wall ischemia.  Cath preformed; identified moderate CAD with stenosis of 40-50% in LAD and RCA.  No stents were placed.  Echo in 01/2018 (done while in Afib with rates of 100-110); EF of 55-60%.  No RWMA.       GERD (gastroesophageal reflux disease) 05/26/2009     Priority: Medium     Hypertrophy of breast 09/25/2007     Priority: Medium     PERS HX TOBACCO USE - quit in 11/06 with chantix 03/15/2007     Priority: Medium     Hypertension, benign essential, goal below 140/90 11/07/2005     Priority: Medium     Patient has only fair bp control and with family history of diabetes will all ace if lab indicated also has bph and may op for psa and not digital exam next yr        Pain in joint, shoulder region 11/07/2005     Priority: Medium     Hypertrophy of prostate without urinary obstruction 11/07/2005     Priority: Medium     Problem list name updated by automated process. 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 difficile colitis      Colon polyp      Colon polyp 8/26/2011     Coronary artery disease      Diabetes mellitus (H)      Diverticulitis      Diverticulitis of colon 7/17/2007     Hypertension      Leukocytosis 4/17/2019     Malignant neoplasm (H)      Noninfectious ileitis      Recurrent pancreatitis        Past Surgical History:    Past Surgical History:   Procedure Laterality Date     BREAST SURGERY  2008    right breast mass benign      COLONOSCOPY      multiple polyps removed     COLONOSCOPY  8/24/2011    Procedure:COMBINED COLONOSCOPY, REMOVE TUMOR/POLYP/LESION BY SNARE; Surgeon:MILEY ARBOLEDA; Location:[facility]     COLONOSCOPY  12/17/2012    Procedure: COLONOSCOPY;;  Surgeon: Leon Maurer MD;  [facility]     COLONOSCOPY  12/18/2012    Procedure: COLONOSCOPY;;  Surgeon: Leon Maurer MD;  [facility]     DENERVATION OF SPERMATIC CORD MICROSURGICAL Left 5/23/2017    Procedure: DENERVATION OF SPERMATIC CORD MICROSURGICAL;;  Surgeon: Marcio Aggarwal MD;  [facility]     DISSECTION RADICAL NECK BILATERAL  8/2/2012    Procedure: DISSECTION RADICAL NECK BILATERAL;;  Surgeon: Yung Alvares MD;  [facility]     ENDOSCOPIC RETROGRADE CHOLANGIOPANCREATOGRAM N/A 5/10/2016    Procedure: COMBINED ENDOSCOPIC RETROGRADE CHOLANGIOPANCREATOGRAPHY, PLACE TUBE/STENT;  Surgeon: Yovanny Beasley MD;  [facility]     ENDOSCOPIC RETROGRADE CHOLANGIOPANCREATOGRAM N/A 3/29/2018    Procedure: ENDOSCOPIC RETROGRADE CHOLANGIOPANCREATOGRAM;  Endoscopic Retrograde Cholangiopancreatogram, Endoscopic Ultrasound, Biliary Sphincterotomy, Biliary and Pancreatic Stent Placement;  Surgeon: Yovanny Beasley MD;  [facility]     ENDOSCOPIC ULTRASOUND UPPER GASTROINTESTINAL TRACT (GI) N/A 2/3/2016    Procedure: ENDOSCOPIC ULTRASOUND, ESOPHAGOSCOPY / UPPER GASTROINTESTINAL TRACT (GI);  Surgeon: Grabiel Plata MD;  [facility]     ENDOSCOPIC ULTRASOUND UPPER GASTROINTESTINAL TRACT (GI) N/A 3/29/2018    Procedure: ENDOSCOPIC ULTRASOUND, ESOPHAGOSCOPY / UPPER GASTROINTESTINAL TRACT (GI);;  Surgeon: Grabiel Plata MD;  [facility]     ESOPHAGOSCOPY, GASTROSCOPY, DUODENOSCOPY (EGD), COMBINED N/A 2/3/2016    Procedure: COMBINED ENDOSCOPIC ULTRASOUND, ESOPHAGOSCOPY, GASTROSCOPY, DUODENOSCOPY (EGD), FINE NEEDLE ASPIRATE/BIOPSY;  Surgeon: Grabiel Plata MD;  [facility]     ESOPHAGOSCOPY,  GASTROSCOPY, DUODENOSCOPY (EGD), COMBINED N/A 6/8/2016    Procedure: COMBINED ESOPHAGOSCOPY, GASTROSCOPY, DUODENOSCOPY (EGD), REMOVE FOREIGN BODY;  Surgeon: Yovanny Beasley MD;  [facility]     ESOPHAGOSCOPY, GASTROSCOPY, DUODENOSCOPY (EGD), COMBINED N/A 4/17/2018    Procedure: COMBINED ESOPHAGOSCOPY, GASTROSCOPY, DUODENOSCOPY (EGD), REMOVE FOREIGN BODY;  EGD with stent removal;  Surgeon: Grabiel Plata MD;  [facility]     EXCISE LESION INTRAORAL  6/14/2012    Procedure: EXCISE LESION INTRAORAL;  Wide Local Excision Floor of Mouth, Direct Laryngoscopy, Bilateral Dale's Marsuplization, Split Thickness Skin Graft from right Thigh  Latex Safe;  Surgeon: Gerson Ravi MD;  [facility]     EXCISE LESION INTRAORAL  8/2/2012    Procedure: EXCISE LESION INTRAORAL;  Floor of Mouth Resection, Bilateral Selective Radical Neck Dissection, Tracheostomy, Left Radial Forearm  Free Flap with Alloderm, Nasogastric Feeding Tube Placement,    * Latex Safe*;  Surgeon: Gerson Ravi MD;  [facility]     EXCISE LESION INTRAORAL  12/11/2012    Procedure: EXCISE LESION INTRAORAL;  takedown of oral flap;  Surgeon: Yung Alvares MD;  [facility]     GRAFT FREE VASCULARIZED (LOCATION)  8/2/2012    Procedure: GRAFT FREE VASCULARIZED (LOCATION);;  Surgeon: Yung Alvares MD;  [facility]     GRAFT SKIN SPLIT THICKNESS FROM EXTREMITY  6/14/2012    Procedure: GRAFT SKIN SPLIT THICKNESS FROM EXTREMITY;;  Surgeon: Gerson Ravi MD;  [facility]     LAPAROSCOPIC ILEOSTOMY TAKEDOWN  6/6/2013    Procedure: LAPAROSCOPIC ILEOSTOMY TAKEDOWN;  Laparoscopic Closure of Enterostomy, Guerda's Type with IleoRectal Anastomosis ;  Surgeon: Grabiel Riddle MD;  [facility]     LAPAROTOMY EXPLORATORY  12/20/2012    Procedure: LAPAROTOMY EXPLORATORY;  Exploratory Laparotomy, total abdominal colectomy, ileostomy formation;  Surgeon: Miquel Cannon MD;  [facility]     LARYNGOSCOPY   "2012    Procedure: LARYNGOSCOPY;;  Surgeon: Gerson Ravi MD;  [facility]     ORTHOPEDIC SURGERY      ganglian cyst left ankle     PANCREATECTOMY, SPLENECTOMY N/A 3/10/2016    Procedure: PANCREATECTOMY, SPLENECTOMY;  Surgeon: Nael Abel MD;  [facility]     SHOULDER SURGERY  ,     2006- right rotator cuff,  bone spur on left. Dr. Hdez     VARICOCELECTOMY Left 2017    Procedure: VARICOCELECTOMY;  Left Varicocele Repair, Denervation of Left Testis;  Surgeon: Marcio Aggarwal MD;  [facility]       Family History:    Family History   Problem Relation Age of Onset     Diabetes Sister         onset age 50     Alzheimer Disease Mother          80     Alzheimer Disease Father          85     Diabetes Other         nephew type 1     Diabetes Other      Aneurysm Sister      Anesthesia Reaction No family hx of      Colon Cancer No family hx of      Colon Polyps No family hx of      Crohn's Disease No family hx of      Ulcerative Colitis No family hx of        Social History:  Marital Status:   [2]  Social History     Tobacco Use     Smoking status: Former Smoker     Packs/day: 1.00     Years: 40.00     Pack years: 40.00     Types: Cigarettes     Last attempt to quit: 2006     Years since quittin.1     Smokeless tobacco: Never Used   Substance Use Topics     Alcohol use: Not Currently     Comment: \"1 beer on Thanksgiving day\"     Drug use: No        Medications:    cephALEXin (KEFLEX) 500 MG capsule  cephALEXin (KEFLEX) 500 MG capsule  ASPIRIN NOT PRESCRIBED (INTENTIONAL)  blood glucose (CONTOUR NEXT TEST) test strip  Continuous Blood Gluc  (FREESTYLE LISA 14 DAY READER) JOSE ALBERTO  Continuous Blood Gluc Sensor (FREESTYLE LISA 14 DAY SENSOR) MISC  gabapentin (NEURONTIN) 600 MG tablet  insulin degludec (TRESIBA) 200 UNIT/ML pen  insulin glargine U-300 (TOUJEO) 300 UNIT/ML (1 units dial) pen  Insulin Lispro (HUMALOG KWIKPEN) 200 UNIT/ML soln  insulin pen " "needle (BD LUIS U/F) 32G X 4 MM miscellaneous  lisinopril (PRINIVIL/ZESTRIL) 5 MG tablet  metoprolol succinate ER (TOPROL-XL) 50 MG 24 hr tablet  multivitamin, therapeutic with minerals (THERA-VIT-M) TABS  rosuvastatin (CRESTOR) 10 MG tablet  tamsulosin (FLOMAX) 0.4 MG capsule  vitamin B-12 (CYANOCOBALAMIN) 100 MCG tablet  warfarin ANTICOAGULANT (COUMADIN) 2.5 MG tablet          Review of Systems   Constitutional: Negative for appetite change, chills, fatigue and fever.   HENT: Negative for congestion and rhinorrhea.    Respiratory: Negative for chest tightness and shortness of breath.    Cardiovascular: Negative for chest pain.   Gastrointestinal: Positive for diarrhea. Negative for abdominal pain, nausea and vomiting.   Genitourinary: Positive for decreased urine volume, dysuria, enuresis, frequency and urgency. Negative for hematuria.   Musculoskeletal: Negative for back pain.   Skin: Negative for rash.   Neurological: Negative for weakness, light-headedness and headaches.   All other systems reviewed and are negative.      Physical Exam   BP: (!) 166/73  Pulse: 120  Temp: 98.1  F (36.7  C)  Resp: 20  Height: 177.8 cm (5' 10\")  Weight: 95.3 kg (210 lb)  SpO2: 92 %      Physical Exam  Vitals signs and nursing note reviewed.   Constitutional:       Appearance: Normal appearance. He is not ill-appearing or diaphoretic.   HENT:      Head: Normocephalic and atraumatic.      Nose: Nose normal.      Mouth/Throat:      Mouth: Mucous membranes are moist.   Eyes:      Conjunctiva/sclera: Conjunctivae normal.   Cardiovascular:      Rate and Rhythm: Normal rate.      Pulses: Normal pulses.   Pulmonary:      Effort: Pulmonary effort is normal.   Abdominal:      General: Bowel sounds are normal. There is no distension.      Palpations: Abdomen is soft.      Tenderness: There is no abdominal tenderness. There is no right CVA tenderness, left CVA tenderness or guarding.   Musculoskeletal: Normal range of motion.   Skin:     " General: Skin is warm and dry.      Capillary Refill: Capillary refill takes less than 2 seconds.   Neurological:      Mental Status: He is alert and oriented to person, place, and time.   Psychiatric:         Mood and Affect: Mood normal.         ED Course        Procedures                 Results for orders placed or performed during the hospital encounter of 01/24/20 (from the past 24 hour(s))   CBC with platelets differential   Result Value Ref Range    WBC 16.3 (H) 4.0 - 11.0 10e9/L    RBC Count 4.20 (L) 4.4 - 5.9 10e12/L    Hemoglobin 13.6 13.3 - 17.7 g/dL    Hematocrit 42.0 40.0 - 53.0 %     78 - 100 fl    MCH 32.4 26.5 - 33.0 pg    MCHC 32.4 31.5 - 36.5 g/dL    RDW 15.0 10.0 - 15.0 %    Platelet Count 226 150 - 450 10e9/L    Diff Method Automated Method     % Neutrophils 79.0 %    % Lymphocytes 12.5 %    % Monocytes 7.6 %    % Eosinophils 0.1 %    % Basophils 0.2 %    % Immature Granulocytes 0.6 %    Nucleated RBCs 0 0 /100    Absolute Neutrophil 12.9 (H) 1.6 - 8.3 10e9/L    Absolute Lymphocytes 2.0 0.8 - 5.3 10e9/L    Absolute Monocytes 1.2 0.0 - 1.3 10e9/L    Absolute Eosinophils 0.0 0.0 - 0.7 10e9/L    Absolute Basophils 0.0 0.0 - 0.2 10e9/L    Abs Immature Granulocytes 0.1 0 - 0.4 10e9/L    Absolute Nucleated RBC 0.0    Comprehensive metabolic panel   Result Value Ref Range    Sodium 136 133 - 144 mmol/L    Potassium 4.8 3.4 - 5.3 mmol/L    Chloride 105 94 - 109 mmol/L    Carbon Dioxide 22 20 - 32 mmol/L    Anion Gap 9 3 - 14 mmol/L    Glucose 101 (H) 70 - 99 mg/dL    Urea Nitrogen 22 7 - 30 mg/dL    Creatinine 0.98 0.66 - 1.25 mg/dL    GFR Estimate 76 >60 mL/min/[1.73_m2]    GFR Estimate If Black 89 >60 mL/min/[1.73_m2]    Calcium 9.3 8.5 - 10.1 mg/dL    Bilirubin Total 0.7 0.2 - 1.3 mg/dL    Albumin 2.9 (L) 3.4 - 5.0 g/dL    Protein Total 7.6 6.8 - 8.8 g/dL    Alkaline Phosphatase 55 40 - 150 U/L    ALT 25 0 - 70 U/L    AST 32 0 - 45 U/L   UA reflex to Microscopic   Result Value Ref Range     "Color Urine Yellow     Appearance Urine Cloudy     Glucose Urine Negative NEG^Negative mg/dL    Bilirubin Urine Negative NEG^Negative    Ketones Urine 20 (A) NEG^Negative mg/dL    Specific Gravity Urine 1.015 1.003 - 1.035    Blood Urine Moderate (A) NEG^Negative    pH Urine 5.0 5.0 - 7.0 pH    Protein Albumin Urine 100 (A) NEG^Negative mg/dL    Urobilinogen mg/dL 0.0 0.0 - 2.0 mg/dL    Nitrite Urine Positive (A) NEG^Negative    Leukocyte Esterase Urine Moderate (A) NEG^Negative    Source Midstream Urine     RBC Urine 30 (H) 0 - 2 /HPF    WBC Urine >182 (H) 0 - 5 /HPF    WBC Clumps Present (A) NEG^Negative /HPF    Mucous Urine Present (A) NEG^Negative /LPF   Lactic acid whole blood   Result Value Ref Range    Lactic Acid 0.9 0.7 - 2.0 mmol/L       Medications   cephALEXin (KEFLEX) capsule 500 mg (500 mg Oral Not Given 1/25/20 0245)   lactated ringers BOLUS 1,000 mL (0 mLs Intravenous Stopped 1/25/20 0150)   lactated ringers BOLUS 1,000 mL (0 mLs Intravenous Stopped 1/25/20 0246)   cephALEXin (KEFLEX) capsule 500 mg (500 mg Oral Given 1/25/20 0150)     Patient Vitals for the past 24 hrs:   BP Temp Temp src Pulse Resp SpO2 Height Weight   01/25/20 0246 -- -- -- 107 -- -- -- --   01/25/20 0215 (!) 164/78 -- -- 101 -- 93 % -- --   01/25/20 0200 (!) 168/77 -- -- 103 -- 92 % -- --   01/25/20 0145 (!) 156/71 -- -- 108 -- 93 % -- --   01/25/20 0139 (!) 176/80 98.1  F (36.7  C) Oral 114 -- 95 % -- --   01/25/20 0115 (!) 175/79 -- -- 105 -- 94 % -- --   01/25/20 0100 -- -- -- -- -- 94 % -- --   01/25/20 0045 -- -- -- -- -- 94 % -- --   01/25/20 0030 -- -- -- -- -- 93 % -- --   01/25/20 0000 -- -- -- -- -- 91 % -- --   01/24/20 2352 (!) 166/73 98.1  F (36.7  C) Oral 120 20 92 % 1.778 m (5' 10\") 95.3 kg (210 lb)         1:12 AM: UA strongly suggestive of a uti. Culture ordered. Starting cephalexin empirically. Will give second liter of ivf for possible sepsis (hr and wbc elevated). Lactic acid added on.     1:35 AM: Lactic acid " normal.  Patient ambulated easily to the bathroom without difficulty.  He does report some mild diarrhea currently which is new today.  Also reports still minimal urine output despite a liter of fluids.  Mouth still mildly dry.  Will order a second liter of fluids.    2:45 AM: Patient up to the bathroom a second time.  Heart rate improved, now at just over 100.  Plan to discharge home with continuation of antibiotics.  Return precautions given if he develops any new or concerning symptoms.    Assessments & Plan (with Medical Decision Making)  74-year-old male presenting for evaluation of urinary discomfort with frequency and incontinence.  No fevers or chills.  No flank pain.  Symptoms and UA suggestive of a bacterial urinary tract infection.  Patient did have an elevated white count with left shift but a normal lactic acid.  Initially tachycardic to 120 but heart rate improved with IV fluids suggesting some degree of dehydration.  Patient started empirically on cephalexin.  Additional dose given for tomorrow morning as his pharmacy will not be open in time for his next dose.     I have reviewed the nursing notes.    I have reviewed the findings, diagnosis, plan and need for follow up with the patient.      New Prescriptions    CEPHALEXIN (KEFLEX) 500 MG CAPSULE    Take 1 capsule (500 mg) by mouth once for 1 dose At 7am this morning    CEPHALEXIN (KEFLEX) 500 MG CAPSULE    Take 1 capsule (500 mg) by mouth 4 times daily for 7 days       Final diagnoses:   UTI (urinary tract infection), bacterial   Dehydration       1/24/2020   [facility] EMERGENCY DEPARTMENT     Burnette, Zain Spain MD  01/25/20 0255

## 2020-01-26 LAB
BACTERIA SPEC CULT: ABNORMAL
BACTERIA SPEC CULT: ABNORMAL
Lab: ABNORMAL
SPECIMEN SOURCE: ABNORMAL

## 2020-01-27 RX ORDER — GABAPENTIN 300 MG/1
300 CAPSULE ORAL AT BEDTIME
Qty: 90 CAPSULE | Refills: 1 | Status: SHIPPED | OUTPATIENT
Start: 2020-01-27 | End: 2020-05-12

## 2020-01-27 NOTE — RESULT ENCOUNTER NOTE
Final Urine Culture Report on 1/26/2020  Emergency Dept/Urgent Care discharge antibiotic prescribed: Cephalexin (Keflex) 500 mg capsule, 1 capsule (500 mg) by mouth 4 times daily for 7 days.  #1. Bacteria, >100,000 colonies/mL Escherichia coli, is SUSCEPTIBLE to Antibiotic.    As per [place] ED Lab Result protocol, no change in antibiotic therapy.

## 2020-01-30 ENCOUNTER — ANTICOAGULATION THERAPY VISIT (OUTPATIENT)
Dept: ANTICOAGULATION | Facility: CLINIC | Age: 75
End: 2020-01-30
Payer: COMMERCIAL

## 2020-01-30 DIAGNOSIS — I48.20 CHRONIC ATRIAL FIBRILLATION (H): ICD-10-CM

## 2020-01-30 DIAGNOSIS — Z79.01 LONG TERM CURRENT USE OF ANTICOAGULANT THERAPY: ICD-10-CM

## 2020-01-30 LAB — INR POINT OF CARE: 1.5 (ref 0.86–1.14)

## 2020-01-30 PROCEDURE — 99207 ZZC NO CHARGE NURSE ONLY: CPT

## 2020-01-30 PROCEDURE — 36416 COLLJ CAPILLARY BLOOD SPEC: CPT

## 2020-01-30 PROCEDURE — 85610 PROTHROMBIN TIME: CPT | Mod: QW

## 2020-01-30 RX ORDER — WARFARIN SODIUM 2.5 MG/1
TABLET ORAL
Qty: 100 TABLET | Refills: 0
Start: 2020-01-30 | End: 2020-02-10

## 2020-01-30 NOTE — PROGRESS NOTES
ANTICOAGULATION FOLLOW-UP CLINIC VISIT    Patient Name:  Antoine Russo  Date:  2020  Contact Type:  Face to Face    SUBJECTIVE:  Patient Findings     Positives:   Change in medications (on last day of keflex for UTI)    Comments:   Patient reports no further issues with nosebleeds but his INR has been sub therapeutic for a week now. He was diagnosed with a UTI at the ED on 20. This may have been why his INRs were supra therapeutic the weeks prior to this. He has been on abx for almost a week now. Will advise a booster dose today, then 2.5 mg daily and recheck at OV with PCP on 20.         Clinical Outcomes     Comments:   Patient reports no further issues with nosebleeds but his INR has been sub therapeutic for a week now. He was diagnosed with a UTI at the ED on 20. This may have been why his INRs were supra therapeutic the weeks prior to this. He has been on abx for almost a week now. Will advise a booster dose today, then 2.5 mg daily and recheck at OV with PCP on 20.            OBJECTIVE    INR Protime   Date Value Ref Range Status   2020 1.5 (A) 0.86 - 1.14 Final       ASSESSMENT / PLAN  INR assessment SUB    Recheck INR In: 5 DAYS    INR Location Clinic      Anticoagulation Summary  As of 2020    INR goal:   2.0-3.0   TTR:   54.3 % (10.5 mo)   INR used for dosin.5! (2020)   Warfarin maintenance plan:   1.25 mg (2.5 mg x 0.5) every Mon, Thu; 2.5 mg (2.5 mg x 1) all other days   Full warfarin instructions:   : 3.75 mg; 2/3: 2.5 mg; Otherwise 1.25 mg every Mon, Thu; 2.5 mg all other days   Weekly warfarin total:   15 mg   Plan last modified:   Aparna Kunz RN (2020)   Next INR check:   2020   Priority:   High   Target end date:   Indefinite    Indications    Chronic atrial fibrillation [I48.20]  Long term current use of anticoagulant therapy [Z79.01]             Anticoagulation Episode Summary     INR check location:       Preferred lab:       Send  INR reminders to:   [facility] PH[facility] ANTICOAG POOL    Comments:   * 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 No bandaid. 2-3 light beers daily      Anticoagulation Care Providers     Provider Role Specialty Phone number    Katie Martino MD [facility] [phone]            See the Encounter Report to view Anticoagulation Flowsheet and Dosing Calendar (Go to Encounters tab in chart review, and find the Anticoagulation Therapy Visit)        Aparna Kunz RN

## 2020-02-04 ENCOUNTER — OFFICE VISIT (OUTPATIENT)
Dept: FAMILY MEDICINE | Facility: CLINIC | Age: 75
End: 2020-02-04
Payer: COMMERCIAL

## 2020-02-04 ENCOUNTER — ANTICOAGULATION THERAPY VISIT (OUTPATIENT)
Dept: ANTICOAGULATION | Facility: CLINIC | Age: 75
End: 2020-02-04
Payer: COMMERCIAL

## 2020-02-04 VITALS
RESPIRATION RATE: 20 BRPM | DIASTOLIC BLOOD PRESSURE: 80 MMHG | BODY MASS INDEX: 31.42 KG/M2 | SYSTOLIC BLOOD PRESSURE: 132 MMHG | OXYGEN SATURATION: 96 % | TEMPERATURE: 97.8 F | HEART RATE: 79 BPM | WEIGHT: 219 LBS

## 2020-02-04 DIAGNOSIS — C04.0 MALIGNANT NEOPLASM OF ANTERIOR PORTION OF FLOOR OF MOUTH (H): ICD-10-CM

## 2020-02-04 DIAGNOSIS — I10 HYPERTENSION, BENIGN ESSENTIAL, GOAL BELOW 140/90: ICD-10-CM

## 2020-02-04 DIAGNOSIS — I48.20 CHRONIC ATRIAL FIBRILLATION (H): ICD-10-CM

## 2020-02-04 DIAGNOSIS — E78.5 HYPERLIPIDEMIA LDL GOAL <100: ICD-10-CM

## 2020-02-04 DIAGNOSIS — M54.40 CHRONIC LOW BACK PAIN WITH SCIATICA, SCIATICA LATERALITY UNSPECIFIED, UNSPECIFIED BACK PAIN LATERALITY: ICD-10-CM

## 2020-02-04 DIAGNOSIS — E11.42 TYPE 2 DIABETES MELLITUS WITH DIABETIC POLYNEUROPATHY, WITHOUT LONG-TERM CURRENT USE OF INSULIN (H): Primary | ICD-10-CM

## 2020-02-04 DIAGNOSIS — G62.9 PERIPHERAL POLYNEUROPATHY: ICD-10-CM

## 2020-02-04 DIAGNOSIS — Z79.01 LONG TERM CURRENT USE OF ANTICOAGULANT THERAPY: ICD-10-CM

## 2020-02-04 DIAGNOSIS — M48.061 SPINAL STENOSIS OF LUMBAR REGION, UNSPECIFIED WHETHER NEUROGENIC CLAUDICATION PRESENT: ICD-10-CM

## 2020-02-04 DIAGNOSIS — G89.29 CHRONIC LOW BACK PAIN WITH SCIATICA, SCIATICA LATERALITY UNSPECIFIED, UNSPECIFIED BACK PAIN LATERALITY: ICD-10-CM

## 2020-02-04 PROBLEM — F10.20 ALCOHOL DEPENDENCE, UNCOMPLICATED (H): Status: ACTIVE | Noted: 2020-02-04

## 2020-02-04 PROBLEM — M54.42 ACUTE RIGHT-SIDED LOW BACK PAIN WITH LEFT-SIDED SCIATICA: Status: ACTIVE | Noted: 2020-02-04

## 2020-02-04 LAB
ALBUMIN SERPL-MCNC: 2.9 G/DL (ref 3.4–5)
ALP SERPL-CCNC: 60 U/L (ref 40–150)
ALT SERPL W P-5'-P-CCNC: 28 U/L (ref 0–70)
ANION GAP SERPL CALCULATED.3IONS-SCNC: 4 MMOL/L (ref 3–14)
AST SERPL W P-5'-P-CCNC: 20 U/L (ref 0–45)
BILIRUB SERPL-MCNC: 0.5 MG/DL (ref 0.2–1.3)
BUN SERPL-MCNC: 18 MG/DL (ref 7–30)
CALCIUM SERPL-MCNC: 9.6 MG/DL (ref 8.5–10.1)
CHLORIDE SERPL-SCNC: 104 MMOL/L (ref 94–109)
CHOLEST SERPL-MCNC: 143 MG/DL
CO2 SERPL-SCNC: 30 MMOL/L (ref 20–32)
CREAT SERPL-MCNC: 0.98 MG/DL (ref 0.66–1.25)
GFR SERPL CREATININE-BSD FRML MDRD: 76 ML/MIN/{1.73_M2}
GLUCOSE SERPL-MCNC: 126 MG/DL (ref 70–99)
HBA1C MFR BLD: 6.5 % (ref 0–5.6)
HDLC SERPL-MCNC: 45 MG/DL
INR PPP: 2.42 (ref 0.86–1.14)
LDLC SERPL CALC-MCNC: 74 MG/DL
NONHDLC SERPL-MCNC: 98 MG/DL
POTASSIUM SERPL-SCNC: 5.1 MMOL/L (ref 3.4–5.3)
PROT SERPL-MCNC: 7.7 G/DL (ref 6.8–8.8)
SODIUM SERPL-SCNC: 138 MMOL/L (ref 133–144)
TRIGL SERPL-MCNC: 121 MG/DL
TSH SERPL DL<=0.005 MIU/L-ACNC: 1.58 MU/L (ref 0.4–4)

## 2020-02-04 PROCEDURE — 99207 ZZC NO CHARGE NURSE ONLY: CPT

## 2020-02-04 PROCEDURE — 80061 LIPID PANEL: CPT | Performed by: FAMILY MEDICINE

## 2020-02-04 PROCEDURE — 36415 COLL VENOUS BLD VENIPUNCTURE: CPT | Performed by: FAMILY MEDICINE

## 2020-02-04 PROCEDURE — 83036 HEMOGLOBIN GLYCOSYLATED A1C: CPT | Performed by: FAMILY MEDICINE

## 2020-02-04 PROCEDURE — 80053 COMPREHEN METABOLIC PANEL: CPT | Performed by: FAMILY MEDICINE

## 2020-02-04 PROCEDURE — 99214 OFFICE O/P EST MOD 30 MIN: CPT | Performed by: FAMILY MEDICINE

## 2020-02-04 PROCEDURE — 84443 ASSAY THYROID STIM HORMONE: CPT | Performed by: FAMILY MEDICINE

## 2020-02-04 PROCEDURE — 99000 SPECIMEN HANDLING OFFICE-LAB: CPT | Performed by: FAMILY MEDICINE

## 2020-02-04 PROCEDURE — 84207 ASSAY OF VITAMIN B-6: CPT | Mod: 90 | Performed by: FAMILY MEDICINE

## 2020-02-04 PROCEDURE — 84425 ASSAY OF VITAMIN B-1: CPT | Mod: 90 | Performed by: PSYCHIATRY & NEUROLOGY

## 2020-02-04 PROCEDURE — 85610 PROTHROMBIN TIME: CPT | Performed by: FAMILY MEDICINE

## 2020-02-04 NOTE — PATIENT INSTRUCTIONS
Let Ty know your good news    Lab Results   Component Value Date    A1C 6.5 02/04/2020    A1C 9.0 10/17/2019    A1C 9.7 07/18/2019    A1C 11.6 04/16/2019    A1C 13.0 03/12/2019      see you in 3 months

## 2020-02-04 NOTE — PROGRESS NOTES
ANTICOAGULATION FOLLOW-UP CLINIC VISIT    Patient Name:  Antoine Russo  Date:  2020  Contact Type:  Telephone    SUBJECTIVE:  Patient Findings     Comments:   Finished course of Keflex and UTI Sx are improved.  No changes in medications, activity, or diet noted. No concerns with clotting, bleeding, or increased bruising noted. Took warfarin as prescribed.  Patient had 17.5 mg in the last 7 days, will adjust dose to 17.5 mg by next INR check on Monday (pt previously was taking 18.75 mg/wk).  Patient verbalizes understanding and agrees to plan. No further questions or concerns.        Clinical Outcomes     Negatives:   Major bleeding event, Thromboembolic event, Anticoagulation-related hospital admission, Anticoagulation-related ED visit, Anticoagulation-related fatality    Comments:   Finished course of Keflex and UTI Sx are improved.  No changes in medications, activity, or diet noted. No concerns with clotting, bleeding, or increased bruising noted. Took warfarin as prescribed.  Patient had 17.5 mg in the last 7 days, will adjust dose to 17.5 mg by next INR check on Monday (pt previously was taking 18.75 mg/wk).  Patient verbalizes understanding and agrees to plan. No further questions or concerns.           OBJECTIVE    INR   Date Value Ref Range Status   2020 2.42 (H) 0.86 - 1.14 Final       ASSESSMENT / PLAN  INR assessment THER    Recheck INR In: 1 WEEK    INR Location Outside lab      Anticoagulation Summary  As of 2020    INR goal:   2.0-3.0   TTR:   54.3 % (10.5 mo)   INR used for dosin.42 (2020)   Warfarin maintenance plan:   2.5 mg (2.5 mg x 1) every day   Full warfarin instructions:   2.5 mg every day   Weekly warfarin total:   17.5 mg   Plan last modified:   Madhavi Sanders, FRANCHESCA (2020)   Next INR check:   2/10/2020   Priority:   Maintenance   Target end date:   Indefinite    Indications    Chronic atrial fibrillation [I48.20]  Long term current use of anticoagulant therapy  [Z79.01]             Anticoagulation Episode Summary     INR check location:       Preferred lab:       Send INR reminders to:   [facility]    Comments:   * 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 No bandaid. 2-3 light beers daily      Anticoagulation Care Providers     Provider Role Specialty Phone number    Katie Martino MD [facility] [phone]            See the Encounter Report to view Anticoagulation Flowsheet and Dosing Calendar (Go to Encounters tab in chart review, and find the Anticoagulation Therapy Visit)        Madhavi Sanders RN

## 2020-02-04 NOTE — PROGRESS NOTES
Subjective     Antoine Russo is a 74 year old male who presents to clinic today for the following health issues:    HPI   Diabetes Follow-up    How often are you checking your blood sugar? Three times daily  Blood sugar testing frequency justification:  appropriate  What time of day are you checking your blood sugars (select all that apply)?  Before meals  Have you had any blood sugars above 200?  No  Have you had any blood sugars below 70?  Yes the other day    What symptoms do you notice when your blood sugar is low?  Weak    What concerns do you have today about your diabetes? None     Do you have any of these symptoms? (Select all that apply)  Numbness in feet and Burning in feet    Have you had a diabetic eye exam in the last 12 months? No    Lab Results   Component Value Date    A1C 9.0 10/17/2019    A1C 9.7 07/18/2019    A1C 11.6 04/16/2019    A1C 13.0 03/12/2019    A1C 7.6 11/23/2018      on lantus, lispro            Hyperlipidemia Follow-Up      Are you regularly taking any medication or supplement to lower your cholesterol?   Yes- Crestor    Are you having muscle aches or other side effects that you think could be caused by your cholesterol lowering medication?  No  Recent Labs   Lab Test 05/22/19  1159 10/22/18  1651  04/14/15  0853 05/15/14  0832   CHOL 151 163   < > 144 165   HDL 31* 43   < > 27* 30*   LDL 82 90   < > 76 75   TRIG 188* 148   < > 206* 302*   CHOLHDLRATIO  --   --   --  5.3* 5.0    < > = values in this interval not displayed.      Hypertension Follow-up      Do you check your blood pressure regularly outside of the clinic? No     Are you following a low salt diet? No    Are your blood pressures ever more than 140 on the top number (systolic) OR more   than 90 on the bottom number (diastolic), for example 140/90?   On metoprolol, lisinpril    BP Readings from Last 6 Encounters:   02/04/20 132/80   01/25/20 (!) 164/78   01/22/20 (!) 161/86   01/20/20 (!) 159/75   01/20/20 138/60  "  01/04/20 (!) 146/71      Back pain  Severe pain. Has left leg weakness. He will have back surgery done at [place] when A1C is below 8.   He is in constant pain.     Neuropathy is \"terrible\". Gets a lot of \"shooters\".   Is followed by neurology    afib  Rate controlled  On metoprolol ,warfarin    BP Readings from Last 2 Encounters:   02/04/20 132/80   01/25/20 (!) 164/78     Hemoglobin A1C (%)   Date Value   10/17/2019 9.0 (H)   07/18/2019 9.7 (H)     LDL Cholesterol Calculated (mg/dL)   Date Value   05/22/2019 82   10/22/2018 90         BP Readings from Last 3 Encounters:   02/04/20 132/80   01/25/20 (!) 164/78   01/22/20 (!) 161/86    Wt Readings from Last 3 Encounters:   02/04/20 99.3 kg (219 lb)   01/24/20 95.3 kg (210 lb)   01/22/20 95.3 kg (210 lb)            Reviewed and updated as needed this visit by Provider         Review of Systems   ROS: 5 point ROS negative except as noted above in HPI, including Gen., Resp., CV, GI &  system review.       Objective    /80 (BP Location: Right arm)   Pulse 79   Temp 97.8  F (36.6  C) (Tympanic)   Resp 20   Wt 99.3 kg (219 lb)   SpO2 96%   BMI 31.42 kg/m    Body mass index is 31.42 kg/m .  Physical Exam   GENERAL: healthy, alert and no distress  NECK: no adenopathy, no asymmetry, masses, or scars and thyroid normal to palpation  RESP: lungs clear to auscultation - no rales, rhonchi or wheezes  CV: regular rate and rhythm, normal S1 S2, no S3 or S4, no murmur, click or rub, no peripheral edema and peripheral pulses strong  ABDOMEN: soft, nontender, no hepatosplenomegaly, no masses and bowel sounds normal  MS: no gross musculoskeletal defects noted, slight left leg weakness in hip flexors, no edema    Results for orders placed or performed in visit on 02/04/20   **A1C FUTURE 1yr     Status: Abnormal   Result Value Ref Range    Hemoglobin A1C 6.5 (H) 0 - 5.6 %            (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)  Comment: stable  Plan: followed by ENT    (M48.061) Spinal stenosis of lumbar region, unspecified whether neurogenic claudication present  Comment: worsened  Plan: will call [facility] and let them know A1C is well controlled so he can have his surgery    (M54.40,  G89.29) Chronic low back pain with sciatica, sciatica laterality unspecified, unspecified back pain laterality  Comment: as above   Plan: continue gabapentin     Patient Instructions     Let [facility] know your good news    Lab Results   Component Value Date    A1C 6.5 02/04/2020    A1C 9.0 10/17/2019    A1C 9.7 07/18/2019    A1C 11.6 04/16/2019    A1C 13.0 03/12/2019      see you in 3 months       Katie Yoder MD

## 2020-02-04 NOTE — NURSING NOTE
"Chief Complaint   Patient presents with     Diabetes       Initial /80 (BP Location: Right arm)   Pulse 79   Temp 97.8  F (36.6  C) (Tympanic)   Resp 20   Wt 99.3 kg (219 lb)   SpO2 96%   BMI 31.42 kg/m   Estimated body mass index is 31.42 kg/m  as calculated from the following:    Height as of 1/24/20: 1.778 m (5' 10\").    Weight as of this encounter: 99.3 kg (219 lb).    Patient presents to the clinic using No DME    Health Maintenance that is potentially due pending provider review:  NONE    n/a    Is there anyone who you would like to be able to receive your results? No  If yes have patient fill out VIDYA    "

## 2020-02-06 LAB
VIT B1 BLD-MCNC: 169 NMOL/L (ref 70–180)
VIT B6 SERPL-MCNC: 71.4 NMOL/L (ref 20–125)

## 2020-02-06 NOTE — RESULT ENCOUNTER NOTE
Please advise Antoine Russo,  1945, that his vitamin B1 and B6 levels are now in the normal range.   [phone] (home)   Anais Justice MD

## 2020-02-09 DIAGNOSIS — R39.15 URGENCY OF URINATION: ICD-10-CM

## 2020-02-09 DIAGNOSIS — N40.0 HYPERTROPHY OF PROSTATE WITHOUT URINARY OBSTRUCTION: ICD-10-CM

## 2020-02-09 NOTE — TELEPHONE ENCOUNTER
"Requested Prescriptions   Pending Prescriptions Disp Refills     tamsulosin (FLOMAX) 0.4 MG capsule [Pharmacy Med Name: Tamsulosin HCl 0.4 MG Oral Capsule]  0     Sig: TAKE 1 CAPSULE BY MOUTH ONCE DAILY -  NEEDS  BP  RECHECK  NOW       Alpha Blockers Passed - 2/9/2020  9:41 AM        Passed - Blood pressure under 140/90 in past 12 months     BP Readings from Last 3 Encounters:   02/04/20 132/80   01/25/20 (!) 164/78   01/22/20 (!) 161/86                 Passed - Recent (12 mo) or future (30 days) visit within the authorizing provider's specialty     Patient has had an office visit with the authorizing provider or a provider within the authorizing providers department within the previous 12 mos or has a future within next 30 days. See \"Patient Info\" tab in inbasket, or \"Choose Columns\" in Meds & Orders section of the refill encounter.              Passed - Patient does not have Tadalafil, Vardenafil, or Sildenafil on their medication list        Passed - Medication is active on med list        Passed - Patient is 18 years of age or older        Last Written Prescription Date:  11/18/19  Last Fill Quantity: 90,  # refills: 0   Last office visit: 2/4/2020 with prescribing provider:     Future Office Visit:   Next 5 appointments (look out 90 days)    Apr 15, 2020  9:00 AM CDT  Return Visit with Anais Justice MD  [facility] [address] [facility] [id number] [phone]   May 04, 2020  8:00 AM CDT  SHORT with Katie [facility]ino MD  [facility] [address] [place] [id number] [phone]           "

## 2020-02-10 ENCOUNTER — ANTICOAGULATION THERAPY VISIT (OUTPATIENT)
Dept: ANTICOAGULATION | Facility: CLINIC | Age: 75
End: 2020-02-10
Payer: COMMERCIAL

## 2020-02-10 DIAGNOSIS — Z79.01 LONG TERM CURRENT USE OF ANTICOAGULANT THERAPY: ICD-10-CM

## 2020-02-10 DIAGNOSIS — I48.20 CHRONIC ATRIAL FIBRILLATION (H): ICD-10-CM

## 2020-02-10 LAB — INR POINT OF CARE: 3.4 (ref 0.86–1.14)

## 2020-02-10 PROCEDURE — 36416 COLLJ CAPILLARY BLOOD SPEC: CPT

## 2020-02-10 PROCEDURE — 99207 ZZC NO CHARGE NURSE ONLY: CPT

## 2020-02-10 PROCEDURE — 85610 PROTHROMBIN TIME: CPT | Mod: QW

## 2020-02-10 RX ORDER — WARFARIN SODIUM 2.5 MG/1
TABLET ORAL
Qty: 100 TABLET | Refills: 0 | COMMUNITY
Start: 2020-02-10 | End: 2020-03-09

## 2020-02-10 RX ORDER — TAMSULOSIN HYDROCHLORIDE 0.4 MG/1
CAPSULE ORAL
Qty: 90 CAPSULE | Refills: 1 | Status: SHIPPED | OUTPATIENT
Start: 2020-02-10 | End: 2020-08-13

## 2020-02-10 NOTE — PROGRESS NOTES
ANTICOAGULATION FOLLOW-UP CLINIC VISIT    Patient Name:  Antoine Russo  Date:  2/10/2020  Contact Type:  Face to Face    SUBJECTIVE:  Patient Findings     Comments:   Patient was previously on antibiotics, has been done for over a week now. No new concerns noted per patient.     Will decrease dose by ~7% and recheck the INR in 10 days. No issues with bleeding or unusual bruising noted.         Clinical Outcomes     Negatives:   Major bleeding event, Thromboembolic event, Anticoagulation-related hospital admission, Anticoagulation-related ED visit, Anticoagulation-related fatality    Comments:   Patient was previously on antibiotics, has been done for over a week now. No new concerns noted per patient.     Will decrease dose by ~7% and recheck the INR in 10 days. No issues with bleeding or unusual bruising noted.            OBJECTIVE    INR Protime   Date Value Ref Range Status   02/10/2020 3.4 (A) 0.86 - 1.14 Final       ASSESSMENT / PLAN  No question data found.  Anticoagulation Summary  As of 2/10/2020    INR goal:   2.0-3.0   TTR:   55.5 % (10.5 mo)   INR used for dosing:   3.4! (2/10/2020)   Warfarin maintenance plan:   1.25 mg (2.5 mg x 0.5) every Mon; 2.5 mg (2.5 mg x 1) all other days   Full warfarin instructions:   1.25 mg every Mon; 2.5 mg all other days   Weekly warfarin total:   16.25 mg   Plan last modified:   Adeline Duke RN (2/10/2020)   Next INR check:   2/20/2020   Priority:   Maintenance   Target end date:   Indefinite    Indications    Chronic atrial fibrillation [I48.20]  Long term current use of anticoagulant therapy [Z79.01]             Anticoagulation Episode Summary     INR check location:       Preferred lab:       Send INR reminders to:   [facility]    Comments:   * 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 No bandaid. 2-3 light beers daily      Anticoagulation Care Providers     Provider Role Specialty Phone number    Katie Martino MD Responsible Family  Practice [phone]            See the Encounter Report to view Anticoagulation Flowsheet and Dosing Calendar (Go to Encounters tab in chart review, and find the Anticoagulation Therapy Visit)         Adeline Duke RN [facility]

## 2020-02-18 ENCOUNTER — HOSPITAL ENCOUNTER (EMERGENCY)
Facility: CLINIC | Age: 75
Discharge: HOME | End: 2020-02-18
Attending: FAMILY MEDICINE | Admitting: FAMILY MEDICINE
Payer: COMMERCIAL

## 2020-02-18 VITALS
WEIGHT: 210 LBS | SYSTOLIC BLOOD PRESSURE: 167 MMHG | OXYGEN SATURATION: 92 % | HEART RATE: 86 BPM | BODY MASS INDEX: 30.06 KG/M2 | TEMPERATURE: 98 F | DIASTOLIC BLOOD PRESSURE: 77 MMHG | HEIGHT: 70 IN | RESPIRATION RATE: 18 BRPM

## 2020-02-18 DIAGNOSIS — R04.0 EPISTAXIS: ICD-10-CM

## 2020-02-18 LAB
BASOPHILS # BLD AUTO: 0.1 10E9/L (ref 0–0.2)
BASOPHILS NFR BLD AUTO: 0.4 %
DIFFERENTIAL METHOD BLD: ABNORMAL
EOSINOPHIL # BLD AUTO: 0.2 10E9/L (ref 0–0.7)
EOSINOPHIL NFR BLD AUTO: 1.9 %
ERYTHROCYTE [DISTWIDTH] IN BLOOD BY AUTOMATED COUNT: 15.3 % (ref 10–15)
HCT VFR BLD AUTO: 42.9 % (ref 40–53)
HGB BLD-MCNC: 13.3 G/DL (ref 13.3–17.7)
IMM GRANULOCYTES # BLD: 0.1 10E9/L (ref 0–0.4)
IMM GRANULOCYTES NFR BLD: 0.5 %
INR PPP: 2.84 (ref 0.86–1.14)
LYMPHOCYTES # BLD AUTO: 4 10E9/L (ref 0.8–5.3)
LYMPHOCYTES NFR BLD AUTO: 30.9 %
MCH RBC QN AUTO: 31.8 PG (ref 26.5–33)
MCHC RBC AUTO-ENTMCNC: 31 G/DL (ref 31.5–36.5)
MCV RBC AUTO: 103 FL (ref 78–100)
MONOCYTES # BLD AUTO: 1.1 10E9/L (ref 0–1.3)
MONOCYTES NFR BLD AUTO: 8.7 %
NEUTROPHILS # BLD AUTO: 7.4 10E9/L (ref 1.6–8.3)
NEUTROPHILS NFR BLD AUTO: 57.6 %
NRBC # BLD AUTO: 0 10*3/UL
NRBC BLD AUTO-RTO: 0 /100
PLATELET # BLD AUTO: 263 10E9/L (ref 150–450)
RBC # BLD AUTO: 4.18 10E12/L (ref 4.4–5.9)
WBC # BLD AUTO: 12.9 10E9/L (ref 4–11)

## 2020-02-18 PROCEDURE — 85610 PROTHROMBIN TIME: CPT | Performed by: FAMILY MEDICINE

## 2020-02-18 PROCEDURE — 25000125 ZZHC RX 250: Performed by: FAMILY MEDICINE

## 2020-02-18 PROCEDURE — 25000132 ZZH RX MED GY IP 250 OP 250 PS 637: Performed by: FAMILY MEDICINE

## 2020-02-18 PROCEDURE — 30903 CONTROL OF NOSEBLEED: CPT | Mod: RT | Performed by: FAMILY MEDICINE

## 2020-02-18 PROCEDURE — 27210182 ZZH KIT NASAL PACKING: Performed by: FAMILY MEDICINE

## 2020-02-18 PROCEDURE — 99284 EMERGENCY DEPT VISIT MOD MDM: CPT | Mod: 25 | Performed by: FAMILY MEDICINE

## 2020-02-18 PROCEDURE — 85025 COMPLETE CBC W/AUTO DIFF WBC: CPT | Performed by: FAMILY MEDICINE

## 2020-02-18 RX ORDER — TRANEXAMIC ACID 100 MG/ML
500 INJECTION, SOLUTION INTRAVENOUS ONCE
Status: COMPLETED | OUTPATIENT
Start: 2020-02-18 | End: 2020-02-18

## 2020-02-18 RX ADMIN — Medication 2 DROP: at 22:00

## 2020-02-18 RX ADMIN — TRANEXAMIC ACID 250 MG: 100 INJECTION, SOLUTION INTRAVENOUS at 22:24

## 2020-02-18 ASSESSMENT — MIFFLIN-ST. JEOR: SCORE: 1708.8

## 2020-02-18 NOTE — ED AVS SNAPSHOT
[facility] Emergency Department  [address] [facility] [address]  Phone:  [phone]  Fax:  [phone]                                    Antoine Russo   MRN: [id number]    Department:  [facility] Emergency Department   Date of Visit:  2/18/2020           After Visit Summary Signature Page    I have received my discharge instructions, and my questions have been answered. I have discussed any challenges I see with this plan with the nurse or doctor.    ..........................................................................................................................................  Patient/Patient Representative Signature      ..........................................................................................................................................  Patient Representative Print Name and Relationship to Patient    ..................................................               ................................................  Date                                   Time    ..........................................................................................................................................  Reviewed by Signature/Title    ...................................................              ..............................................  Date                                               Time          22EPIC Rev 08/18

## 2020-02-19 ENCOUNTER — TELEPHONE (OUTPATIENT)
Dept: ANTICOAGULATION | Facility: CLINIC | Age: 75
End: 2020-02-19

## 2020-02-19 NOTE — TELEPHONE ENCOUNTER
ANTICOAGULATION  MANAGEMENT: Discharge Review    Antoine JESSICA Russo chart reviewed for anticoagulation continuity of care    Emergency room visit on 2/18/20 for Epistaxis.    Discharge disposition: Home    Results:    Recent Labs   Lab Test 02/18/20  2120 02/10/20 02/04/20  0836   INR 2.84* 3.4* 2.42*       Anticoagulation inpatient management:     not applicable     Anticoagulation discharge instructions:     Warfarin dosing: home regimen continued   Bridging: No   INR goal change: No      Medication changes affecting anticoagulation: No    Additional factors affecting anticoagulation: No    Plan     No adjustment to anticoagulation plan needed    Patient not contacted - appt 2/20    Anticoagulation Calendar updated    Madhavi Sanders RN

## 2020-02-19 NOTE — ED PROVIDER NOTES
History     Chief Complaint   Patient presents with     Epistaxis     nosebleed started  at 1700 patient is on Coumadin     HPI  Antoine Russo is a 74 year old male who presents with epistaxis.  He is on warfarin anticoagulation for atrial fibrillation.  He developed blunt bleeding from the right naris at about 5 PM.  He has not noticed any other bleeding including no coughing blood, vomiting blood, melena, hematochezia, bruising.  He does not have lightheadedness, dyspnea, chest pain.  His last INR was elevated a few weeks ago at 3.4 and his dose was reduced.    Allergies:  Allergies   Allergen Reactions     Nkda [No Known Drug Allergies]        Problem List:    Patient Active Problem List    Diagnosis Date Noted     Anticipated difficulty with intubation 05/23/2017     Priority: High     Class: Chronic     Difficult two hands mask ventilation, intubated multiple times asleep with video laryngoscope. H/o tongue cancer surgery.        Alcohol dependence, uncomplicated (H) 02/04/2020     Priority: Medium     Acute right-sided low back pain with left-sided sciatica 02/04/2020     Priority: Medium     Spinal stenosis of lumbar region, unspecified whether neurogenic claudication present 02/04/2020     Priority: Medium     Pancreatic pseudocyst 04/18/2019     Priority: Medium     Acute pancreatitis 10/21/2018     Priority: Medium     Long term current use of anticoagulant therapy 04/05/2018     Priority: Medium     Recurrent pancreatitis 03/30/2018     Priority: Medium     Chronic atrial fibrillation 01/23/2018     Priority: Medium     Spleen absent 10/10/2017     Priority: Medium     Type 2 diabetes mellitus with diabetic polyneuropathy, without long-term current use of insulin (H) 04/04/2017     Priority: Medium     Left varicocele 04/04/2017     Priority: Medium     Pancreatic duct leak 05/03/2016     Priority: Medium     IPMN (intraductal papillary mucinous neoplasm) 03/10/2016     Priority: Medium     H/O  colectomy 08/08/2013     Priority: Medium     Health Care Home 06/14/2013     Priority: Medium     EMERGENCY CARE PLAN  June 14, 2013: No current Care Coordination follow up planned. Please refer if Care Coordination services are needed.    Presenting Problem Signs and Symptoms Treatment Plan   Questions or concerns   during clinic hours   I will call my clinic directly:  [address], [place] [address]  [phone].   Questions or concerns outside clinic hours   I will call the 24 hour nurse line at   [phone] or [facility].   Need to schedule an appointment   I will call the 24 hour scheduling team at [phone] or my clinic directly at [phone].   Same day treatment     I will call my clinic first, nurse line if after hours, urgent care and express care if needed.   Clinic care coordination services (regular clinic hours)   I will call a clinic care coordinator directly:     Shelia Emanuel RN [facility]  [phone]    Brianna Cristobal :    [phone]    Or call my clinic at [phone] and ask to speak with care coordination.   Crisis Services: Behavioral or Mental Health  [facility] 24 Hour Phone Line  [phone]    [facility] 24 Hour Crisis Services  [phone]    [facility] (Behavioral Health Providers) Network [phone]    [facility]   [phone]     Emergency treatment -- Immediately    [facility] 911                Clostridium difficile enterocolitis 12/18/2012     Priority: Medium     Groin fluid collection 12/15/2012     Priority: Medium     CT 12/12- New (since 5/11) collection measuring at least 2.3 cm in diameter and 6.5 cm in length within the right inguinal canal. 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 Gerson Ravi and aristides at the  in 2012  NAD 2017  CT scan of the neck every 2-3 years.  - Thyroid labs yearly.  - Carotid ultrasound in three to four years to evaluate for stenosis.       Hyperlipidemia LDL goal <100 10/31/2010     Priority: Medium     CAD (coronary artery disease) 05/26/2009     Priority: Medium     Stress testing 2009 showed inferior wall ischemia.  Cath preformed; identified moderate CAD with stenosis of 40-50% in LAD and RCA.  No stents were placed.  Echo in 01/2018 (done while in Afib with rates of 100-110); EF of 55-60%.  No RWMA.       GERD (gastroesophageal reflux disease) 05/26/2009     Priority: Medium     Hypertrophy of breast 09/25/2007     Priority: Medium     PERS HX TOBACCO USE - quit in 11/06 with chantix 03/15/2007     Priority: Medium     Hypertension, benign essential, goal below 140/90 11/07/2005     Priority: Medium     Patient has only fair bp control and with family history of diabetes will all ace if lab indicated also has bph and may op for psa and not digital exam next yr        Pain in joint, shoulder region 11/07/2005     Priority: Medium     Hypertrophy of prostate without urinary obstruction 11/07/2005     Priority: Medium     Problem list name updated by automated process. 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 difficile colitis      Colon polyp      Colon polyp 8/26/2011     Coronary artery disease      Diabetes mellitus (H)      Diverticulitis      Diverticulitis of colon 7/17/2007     Hypertension      Leukocytosis 4/17/2019     Malignant neoplasm (H)       Noninfectious ileitis      Recurrent pancreatitis        Past Surgical History:    Past Surgical History:   Procedure Laterality Date     BREAST SURGERY  2008    right breast mass benign     COLONOSCOPY      multiple polyps removed     COLONOSCOPY  8/24/2011    Procedure:COMBINED COLONOSCOPY, REMOVE TUMOR/POLYP/LESION BY SNARE; Surgeon:MILEY ARBOLEDA; Location:[facility]     COLONOSCOPY  12/17/2012    Procedure: COLONOSCOPY;;  Surgeon: Leon Maurer MD;  [facility]     COLONOSCOPY  12/18/2012    Procedure: COLONOSCOPY;;  Surgeon: Leon Maurer MD;  [facility]     DENERVATION OF SPERMATIC CORD MICROSURGICAL Left 5/23/2017    Procedure: DENERVATION OF SPERMATIC CORD MICROSURGICAL;;  Surgeon: Marcio Aggarwal MD;  [facility]     DISSECTION RADICAL NECK BILATERAL  8/2/2012    Procedure: DISSECTION RADICAL NECK BILATERAL;;  Surgeon: Yung Alvares MD;  [facility]     ENDOSCOPIC RETROGRADE CHOLANGIOPANCREATOGRAM N/A 5/10/2016    Procedure: COMBINED ENDOSCOPIC RETROGRADE CHOLANGIOPANCREATOGRAPHY, PLACE TUBE/STENT;  Surgeon: Yovanny Beasley MD;  [facility]     ENDOSCOPIC RETROGRADE CHOLANGIOPANCREATOGRAM N/A 3/29/2018    Procedure: ENDOSCOPIC RETROGRADE CHOLANGIOPANCREATOGRAM;  Endoscopic Retrograde Cholangiopancreatogram, Endoscopic Ultrasound, Biliary Sphincterotomy, Biliary and Pancreatic Stent Placement;  Surgeon: Yovanny Beasley MD;  [facility]     ENDOSCOPIC ULTRASOUND UPPER GASTROINTESTINAL TRACT (GI) N/A 2/3/2016    Procedure: ENDOSCOPIC ULTRASOUND, ESOPHAGOSCOPY / UPPER GASTROINTESTINAL TRACT (GI);  Surgeon: Grabiel Plata MD;  [facility]     ENDOSCOPIC ULTRASOUND UPPER GASTROINTESTINAL TRACT (GI) N/A 3/29/2018    Procedure: ENDOSCOPIC ULTRASOUND, ESOPHAGOSCOPY / UPPER GASTROINTESTINAL TRACT (GI);;  Surgeon: Grabiel Plata MD;  [facility]     ESOPHAGOSCOPY, GASTROSCOPY, DUODENOSCOPY (EGD), COMBINED N/A 2/3/2016    Procedure:  COMBINED ENDOSCOPIC ULTRASOUND, ESOPHAGOSCOPY, GASTROSCOPY, DUODENOSCOPY (EGD), FINE NEEDLE ASPIRATE/BIOPSY;  Surgeon: Grabiel Plata MD;  [facility]     ESOPHAGOSCOPY, GASTROSCOPY, DUODENOSCOPY (EGD), COMBINED N/A 6/8/2016    Procedure: COMBINED ESOPHAGOSCOPY, GASTROSCOPY, DUODENOSCOPY (EGD), REMOVE FOREIGN BODY;  Surgeon: Yovanny Beasley MD;  [facility]     ESOPHAGOSCOPY, GASTROSCOPY, DUODENOSCOPY (EGD), COMBINED N/A 4/17/2018    Procedure: COMBINED ESOPHAGOSCOPY, GASTROSCOPY, DUODENOSCOPY (EGD), REMOVE FOREIGN BODY;  EGD with stent removal;  Surgeon: Grabiel Plata MD;  [facility]     EXCISE LESION INTRAORAL  6/14/2012    Procedure: EXCISE LESION INTRAORAL;  Wide Local Excision Floor of Mouth, Direct Laryngoscopy, Bilateral Price's Marsuplization, Split Thickness Skin Graft from right Thigh  Latex Safe;  Surgeon: Gerson Ravi MD;  [facility]     EXCISE LESION INTRAORAL  8/2/2012    Procedure: EXCISE LESION INTRAORAL;  Floor of Mouth Resection, Bilateral Selective Radical Neck Dissection, Tracheostomy, Left Radial Forearm  Free Flap with Alloderm, Nasogastric Feeding Tube Placement,    * Latex Safe*;  Surgeon: Gerson Ravi MD;  [facility]     EXCISE LESION INTRAORAL  12/11/2012    Procedure: EXCISE LESION INTRAORAL;  takedown of oral flap;  Surgeon: Yung Alvares MD;  [facility]     GRAFT FREE VASCULARIZED (LOCATION)  8/2/2012    Procedure: GRAFT FREE VASCULARIZED (LOCATION);;  Surgeon: Yung Alvares MD;  [facility]     GRAFT SKIN SPLIT THICKNESS FROM EXTREMITY  6/14/2012    Procedure: GRAFT SKIN SPLIT THICKNESS FROM EXTREMITY;;  Surgeon: Gerson Ravi MD;  [facility]     LAPAROSCOPIC ILEOSTOMY TAKEDOWN  6/6/2013    Procedure: LAPAROSCOPIC ILEOSTOMY TAKEDOWN;  Laparoscopic Closure of Enterostomy, Guerda's Type with IleoRectal Anastomosis ;  Surgeon: Grabiel Riddle MD;  [facility]     LAPAROTOMY EXPLORATORY  12/20/2012     "Procedure: LAPAROTOMY EXPLORATORY;  Exploratory Laparotomy, total abdominal colectomy, ileostomy formation;  Surgeon: Miquel Cannon MD;  [facility]     LARYNGOSCOPY  2012    Procedure: LARYNGOSCOPY;;  Surgeon: Gerson Ravi MD;  [facility]     ORTHOPEDIC SURGERY      ganglian cyst left ankle     PANCREATECTOMY, SPLENECTOMY N/A 3/10/2016    Procedure: PANCREATECTOMY, SPLENECTOMY;  Surgeon: Nael Abel MD;  [facility]     SHOULDER SURGERY  ,     2006- right rotator cuff,  bone spur on left. Dr. Hdez     VARICOCELECTOMY Left 2017    Procedure: VARICOCELECTOMY;  Left Varicocele Repair, Denervation of Left Testis;  Surgeon: Marcio Aggarwal MD;  [facility]       Family History:    Family History   Problem Relation Age of Onset     Diabetes Sister         onset age 50     Alzheimer Disease Mother          80     Alzheimer Disease Father          85     Diabetes Other         nephew type 1     Diabetes Other      Aneurysm Sister      Anesthesia Reaction No family hx of      Colon Cancer No family hx of      Colon Polyps No family hx of      Crohn's Disease No family hx of      Ulcerative Colitis No family hx of        Social History:  Marital Status:   [2]  Social History     Tobacco Use     Smoking status: Former Smoker     Packs/day: 1.00     Years: 40.00     Pack years: 40.00     Types: Cigarettes     Last attempt to quit: 2006     Years since quittin.2     Smokeless tobacco: Never Used   Substance Use Topics     Alcohol use: Not Currently     Comment: \"1 beer on Thanksgiving day\"     Drug use: No        Medications:    ASPIRIN NOT PRESCRIBED (INTENTIONAL)  blood glucose (CONTOUR NEXT TEST) test strip  Continuous Blood Gluc  (FREESTYLE LISA 14 DAY READER) JOSE ALBERTO  Continuous Blood Gluc Sensor (FREESTYLE LISA 14 DAY SENSOR) MISC  gabapentin (NEURONTIN) 300 MG capsule  gabapentin (NEURONTIN) 600 MG tablet  insulin glargine U-300 (TOUJEO) " "300 UNIT/ML (1 units dial) pen  Insulin Lispro (HUMALOG KWIKPEN) 200 UNIT/ML soln  insulin pen needle (BD LUIS U/F) 32G X 4 MM miscellaneous  lisinopril (PRINIVIL/ZESTRIL) 5 MG tablet  metoprolol succinate ER (TOPROL-XL) 50 MG 24 hr tablet  multivitamin, therapeutic with minerals (THERA-VIT-M) TABS  rosuvastatin (CRESTOR) 10 MG tablet  tamsulosin (FLOMAX) 0.4 MG capsule  vitamin B-12 (CYANOCOBALAMIN) 100 MCG tablet  warfarin ANTICOAGULANT (COUMADIN) 2.5 MG tablet          Review of Systems  Further problem focused system review negative.    Physical Exam   BP: (!) 162/78  Pulse: 86  Temp: 98  F (36.7  C)  Resp: 18  Height: 177.8 cm (5' 10\")  Weight: 95.3 kg (210 lb)  SpO2: 92 %      Physical Exam  Chronically ill appearing 74-year-old in no distress.  HEENT: Bright red blood is oozing from the right naris after removal of tissue.  Source of bleeding is not identified.  No masses and no septal hematoma are present.  Left naris is without bleeding.  ED Course        Procedures               Critical Care time:  none               Results for orders placed or performed during the hospital encounter of 02/18/20 (from the past 24 hour(s))   CBC with platelets differential   Result Value Ref Range    WBC 12.9 (H) 4.0 - 11.0 10e9/L    RBC Count 4.18 (L) 4.4 - 5.9 10e12/L    Hemoglobin 13.3 13.3 - 17.7 g/dL    Hematocrit 42.9 40.0 - 53.0 %     (H) 78 - 100 fl    MCH 31.8 26.5 - 33.0 pg    MCHC 31.0 (L) 31.5 - 36.5 g/dL    RDW 15.3 (H) 10.0 - 15.0 %    Platelet Count 263 150 - 450 10e9/L    Diff Method Automated Method     % Neutrophils 57.6 %    % Lymphocytes 30.9 %    % Monocytes 8.7 %    % Eosinophils 1.9 %    % Basophils 0.4 %    % Immature Granulocytes 0.5 %    Nucleated RBCs 0 0 /100    Absolute Neutrophil 7.4 1.6 - 8.3 10e9/L    Absolute Lymphocytes 4.0 0.8 - 5.3 10e9/L    Absolute Monocytes 1.1 0.0 - 1.3 10e9/L    Absolute Eosinophils 0.2 0.0 - 0.7 10e9/L    Absolute Basophils 0.1 0.0 - 0.2 10e9/L    Abs " Immature Granulocytes 0.1 0 - 0.4 10e9/L    Absolute Nucleated RBC 0.0    INR   Result Value Ref Range    INR 2.84 (H) 0.86 - 1.14       Medications   tranexamic acid (CYKLOKAPRON) spray 500 mg (250 mg Both Nostrils Given 2/18/20 2224)   phenylephrine (SLIME-SYNEPHRINE) 0.5 % spray 2 drop (2 drops Nasal Given 2/18/20 2200)     Procedure: Epistaxis treatment.  After blowing clots from the right naris, Slime-Synephrine was sprayed into the right naris and nose prongs were applied for 5 minutes.  Following this a 5.5 cm Rhino Rocket was placed into the right naris after saturating it with tranexamic acid.  Balloon was inflated.  The patient was monitored.    2240: Patient reassessed.  Has had no further bleeding.  Assessments & Plan (with Medical Decision Making)     74-year-old male on warfarin anticoagulation for atrial fibrillation presented with epistaxis and without other bleeding manifestations.  Epistaxis treatment was administered above with topical decongestant followed by Rhino Rocket saturated in tranexamic acid.  This effectively stopped the bleeding.  INR is therapeutic.  Hemoglobin is normal.  Of asked him to leave this in for 2 days and then remove it.  Follow-up in ENT for intranasal examination.  Return if bleeding recurs.  He expressed understanding and agreement with the plan and his questions were answered.    I have reviewed the nursing notes.    I have reviewed the findings, diagnosis, plan and need for follow up with the patient.       New Prescriptions    No medications on file       Final diagnoses:   Epistaxis       2/18/2020   [facility] EMERGENCY DEPARTMENT     Greg Huerta MD  02/18/20 2240

## 2020-02-19 NOTE — DISCHARGE INSTRUCTIONS
Remove the packing in 2 days.  Return if bleeding recurs.  Follow-up with the ear nose and throat surgeon for an examination

## 2020-02-20 ENCOUNTER — ANTICOAGULATION THERAPY VISIT (OUTPATIENT)
Dept: ANTICOAGULATION | Facility: CLINIC | Age: 75
End: 2020-02-20
Payer: COMMERCIAL

## 2020-02-20 DIAGNOSIS — Z79.01 LONG TERM CURRENT USE OF ANTICOAGULANT THERAPY: ICD-10-CM

## 2020-02-20 DIAGNOSIS — I48.20 CHRONIC ATRIAL FIBRILLATION (H): ICD-10-CM

## 2020-02-20 LAB — INR POINT OF CARE: 2.8 (ref 0.86–1.14)

## 2020-02-20 PROCEDURE — 85610 PROTHROMBIN TIME: CPT | Mod: QW

## 2020-02-20 PROCEDURE — 36416 COLLJ CAPILLARY BLOOD SPEC: CPT

## 2020-02-20 PROCEDURE — 99207 ZZC NO CHARGE NURSE ONLY: CPT

## 2020-02-20 NOTE — PROGRESS NOTES
ANTICOAGULATION FOLLOW-UP CLINIC VISIT    Patient Name:  Antoine Russo  Date:  2020  Contact Type:  Face to Face    SUBJECTIVE:  Patient Findings     Comments:   Patient was at ED again with another epistaxis episode. He had a rhino rocket placed which he just removed. He will be seeing ENT on 3-16 about his recurring nose bleeds. INR in range when at ED and today as well. No other changes or concerns. Will continue current warfarin dose and recheck in 2 weeks on Friday, as patient would like an AM appointment.         Clinical Outcomes     Positives:   Anticoagulation-related ED visit ()    Comments:   Patient was at ED again with another epistaxis episode. He had a rhino rocket placed which he just removed. He will be seeing ENT on 3-16 about his recurring nose bleeds. INR in range when at ED and today as well. No other changes or concerns. Will continue current warfarin dose and recheck in 2 weeks on Friday, as patient would like an AM appointment.            OBJECTIVE    INR Protime   Date Value Ref Range Status   2020 2.8 (A) 0.86 - 1.14 Final       ASSESSMENT / PLAN  INR assessment THER    Recheck INR In: 2 WEEKS    INR Location Clinic      Anticoagulation Summary  As of 2020    INR goal:   2.0-3.0   TTR:   54.7 % (10.5 mo)   INR used for dosin.8 (2020)   Warfarin maintenance plan:   1.25 mg (2.5 mg x 0.5) every Mon; 2.5 mg (2.5 mg x 1) all other days   Full warfarin instructions:   1.25 mg every Mon; 2.5 mg all other days   Weekly warfarin total:   16.25 mg   No change documented:   Aparna Kunz RN   Plan last modified:   Adeline Duke RN (2/10/2020)   Next INR check:   3/6/2020   Priority:   Maintenance   Target end date:   Indefinite    Indications    Chronic atrial fibrillation [I48.20]  Long term current use of anticoagulant therapy [Z79.01]             Anticoagulation Episode Summary     INR check location:       Preferred lab:       Send INR reminders to:    [facility]    Comments:   * 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 No bandaid. 2-3 light beers daily      Anticoagulation Care Providers     Provider Role Specialty Phone number    Katie Martino MD [facility] [phone]            See the Encounter Report to view Anticoagulation Flowsheet and Dosing Calendar (Go to Encounters tab in chart review, and find the Anticoagulation Therapy Visit)        Aparna Kunz RN

## 2020-02-25 ENCOUNTER — DOCUMENTATION ONLY (OUTPATIENT)
Dept: EDUCATION SERVICES | Facility: CLINIC | Age: 75
End: 2020-02-25

## 2020-02-25 NOTE — PROGRESS NOTES
Diabetes Education     Called [facility] for clarification on why the continuous glucose monitor wasn't covered.      - Patient has a commercial plan as primary insurance.  Medicare is secondary, so even though patient meets the Medicare Part B requirements for a continuous glucose monitor, it still must be run through primary insurance.  [facility] does not cover type 2 for continuous glucose monitors yet.  Patient could still have the option of cash paying and buying a FreeStyle Berta continuous glucose monitor system using the built in [employer] voucher since he has commercial insurance, but that could be 75$/month.     Lauren Busch RD, LD, CDE  Diabetes Education

## 2020-03-06 ENCOUNTER — ANTICOAGULATION THERAPY VISIT (OUTPATIENT)
Dept: ANTICOAGULATION | Facility: CLINIC | Age: 75
End: 2020-03-06
Payer: COMMERCIAL

## 2020-03-06 DIAGNOSIS — I48.20 CHRONIC ATRIAL FIBRILLATION (H): ICD-10-CM

## 2020-03-06 DIAGNOSIS — Z79.01 LONG TERM CURRENT USE OF ANTICOAGULANT THERAPY: ICD-10-CM

## 2020-03-06 LAB — INR POINT OF CARE: 3.5 (ref 0.86–1.14)

## 2020-03-06 PROCEDURE — 36416 COLLJ CAPILLARY BLOOD SPEC: CPT

## 2020-03-06 PROCEDURE — 85610 PROTHROMBIN TIME: CPT | Mod: QW

## 2020-03-06 PROCEDURE — 99207 ZZC NO CHARGE NURSE ONLY: CPT

## 2020-03-06 NOTE — PROGRESS NOTES
ANTICOAGULATION FOLLOW-UP CLINIC VISIT    Patient Name:  Antoine Russo  Date:  3/6/2020  Contact Type:  Face to Face    SUBJECTIVE:  Patient Findings     Positives:   Change in health (increased back pain)    Comments:   Patient is seeing the ENT on 3/16 due to chronic nose bleeding issues. Patient will have his INR checked afterwards at [place].     INR elevated today likely due to increased inflammation with his back pain. Will have patient decrease dose today, then resume prior maintenance dose. Discussed with patient the increased bleeding risk and when to seek medical attention. No current signs/symptoms of bleeding.         Clinical Outcomes     Comments:   Patient is seeing the ENT on 3/16 due to chronic nose bleeding issues. Patient will have his INR checked afterwards at [place].     INR elevated today likely due to increased inflammation with his back pain. Will have patient decrease dose today, then resume prior maintenance dose. Discussed with patient the increased bleeding risk and when to seek medical attention. No current signs/symptoms of bleeding.            OBJECTIVE    INR Protime   Date Value Ref Range Status   03/06/2020 3.5 (A) 0.86 - 1.14 Final       ASSESSMENT / PLAN  INR assessment SUPRA    Recheck INR In: 10 DAYS    INR Location Clinic      Anticoagulation Summary  As of 3/6/2020    INR goal:   2.0-3.0   TTR:   51.3 % (10.5 mo)   INR used for dosing:   3.5! (3/6/2020)   Warfarin maintenance plan:   1.25 mg (2.5 mg x 0.5) every Mon; 2.5 mg (2.5 mg x 1) all other days   Full warfarin instructions:   3/6: 1.25 mg; Otherwise 1.25 mg every Mon; 2.5 mg all other days   Weekly warfarin total:   16.25 mg   Plan last modified:   Adeline Duke RN (2/10/2020)   Next INR check:   3/16/2020   Priority:   Maintenance   Target end date:   Indefinite    Indications    Chronic atrial fibrillation [I48.20]  Long term current use of anticoagulant therapy [Z79.01]             Anticoagulation  Episode Summary     INR check location:       Preferred lab:       Send INR reminders to:   [facility]    Comments:   * 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 No bandaid. 2-3 light beers daily      Anticoagulation Care Providers     Provider Role Specialty Phone number    Katie Martino MD [facility] [phone]            See the Encounter Report to view Anticoagulation Flowsheet and Dosing Calendar (Go to Encounters tab in chart review, and find the Anticoagulation Therapy Visit)        [facility] Duke RN [facility]

## 2020-03-07 DIAGNOSIS — I48.20 CHRONIC ATRIAL FIBRILLATION (H): ICD-10-CM

## 2020-03-07 NOTE — TELEPHONE ENCOUNTER
"Warfarin Sodium 2.5 MG Oral Tablet  Last Written Prescription Date:  2/10/2020  Last Fill Quantity: 100,  # refills: 0   Last office visit: 2/4/2020 with prescribing provider:  PATIENCE   Future Office Visit:   Next 5 appointments (look out 90 days)    Apr 15, 2020  9:00 AM CDT  Return Visit with Anais Justice MD  [facility] [address] [place] [address]  [phone]   Apr 30, 2020  8:00 AM CDT  SHORT with Katie Martino MD  [facility] [address] [place] [id number] [phone]         Requested Prescriptions   Pending Prescriptions Disp Refills     warfarin ANTICOAGULANT (COUMADIN) 2.5 MG tablet [Pharmacy Med Name: Warfarin Sodium 2.5 MG Oral Tablet]  0     Sig: TAKE 1 AND 1/2 TABLETS (3.75MG) BY MOUTH EVERY MONDAY. TAKE 1 TABLET BY MOUTH ALL OTHER DAYS OR AS DIRECTED BY ANTICOAGULATION CLINIC.       Vitamin K Antagonists Failed - 3/7/2020  8:11 AM        Failed - INR is within goal in the past 6 weeks     Confirm INR is within goal in the past 6 weeks.     Recent Labs   Lab Test 03/06/20   INR 3.5*                       Passed - Recent (12 mo) or future (30 days) visit within the authorizing provider's specialty     Patient has had an office visit with the authorizing provider or a provider within the authorizing providers department within the previous 12 mos or has a future within next 30 days. See \"Patient Info\" tab in inbasket, or \"Choose Columns\" in Meds & Orders section of the refill encounter.              Passed - Medication is active on med list        Passed - Patient is 18 years of age or older          "

## 2020-03-09 RX ORDER — WARFARIN SODIUM 2.5 MG/1
TABLET ORAL
Qty: 85 TABLET | Refills: 0 | Status: SHIPPED | OUTPATIENT
Start: 2020-03-09 | End: 2020-03-16

## 2020-03-09 NOTE — TELEPHONE ENCOUNTER
Current warfarin dose:  Warfarin maintenance plan:   1.25 mg (2.5 mg x 0.5) every Mon; 2.5 mg (2.5 mg x 1) all other days   Full warfarin instructions:   3/6: 1.25 mg; Otherwise 1.25 mg every Mon; 2.5 mg all other days   Weekly warfarin total:   16.25 mg   Next INR check:   3/16/2020     Last INR result:    INR Protime   Date Value Ref Range Status   03/06/2020 3.5 (A) 0.86 - 1.14 Final     Last office visit: 2/4/2020     Refill authorized per [facility] protocol.    Jaxson FARRIS RN, CACP

## 2020-03-10 ENCOUNTER — TELEPHONE (OUTPATIENT)
Dept: FAMILY MEDICINE | Facility: CLINIC | Age: 75
End: 2020-03-10

## 2020-03-10 NOTE — TELEPHONE ENCOUNTER
Called Anais at [facility]- Gave her the information documented in Dr. Yoder's last Office Visit notes from 2/4 regarding Objective MS: Slight leg weakness in hip Flexors.  This approved the MRI per Gina.  Auth # [id number] 3/10/20 - 5/8/20  Pt notified to keep his appt 3/11/20 7 am appt for MRI  Per Gina Lima [facility]

## 2020-03-10 NOTE — TELEPHONE ENCOUNTER
Can Poli ask his ortho at [place] to order?  Can they order the MRI here? There has to be a doctor to doctor discussion why he needs it.

## 2020-03-10 NOTE — TELEPHONE ENCOUNTER
----- Message from Katie Montenegro sent at 3/10/2020  1:05 PM CDT -----  Regarding: Denied authorization - requesting peer to peer review  Dr. RAPP,    The authorization for procedure  MR LUMBAR SPINE W/O CONTRAST on date of service 3/11/2020 has been denied. We were unsuccessful in obtaining approval through clinical review. A [id number] review can be done by calling the insurance/third party authorization vendor with the following information:    Insurance: [facility] Vendor:  [employer]  Phone:  [phone]  Due:  3/12/2020    Patient ID: [id number]  Case/Ref #: N/A    Denial Reason: Does not meet medical necessity with information provided    Please complete this as soon as you are able and let me know when it is done.    Thank you,    Katie  [facility]uring [place]

## 2020-03-11 ENCOUNTER — HOSPITAL ENCOUNTER (OUTPATIENT)
Dept: MRI IMAGING | Facility: CLINIC | Age: 75
Discharge: HOME | End: 2020-03-11
Attending: FAMILY MEDICINE | Admitting: FAMILY MEDICINE
Payer: COMMERCIAL

## 2020-03-11 DIAGNOSIS — G89.29 CHRONIC LOW BACK PAIN WITH SCIATICA, SCIATICA LATERALITY UNSPECIFIED, UNSPECIFIED BACK PAIN LATERALITY: ICD-10-CM

## 2020-03-11 DIAGNOSIS — M54.40 CHRONIC LOW BACK PAIN WITH SCIATICA, SCIATICA LATERALITY UNSPECIFIED, UNSPECIFIED BACK PAIN LATERALITY: ICD-10-CM

## 2020-03-11 DIAGNOSIS — M48.061 SPINAL STENOSIS OF LUMBAR REGION, UNSPECIFIED WHETHER NEUROGENIC CLAUDICATION PRESENT: ICD-10-CM

## 2020-03-11 PROCEDURE — 72148 MRI LUMBAR SPINE W/O DYE: CPT

## 2020-03-12 NOTE — PATIENT INSTRUCTIONS
"Epistaxis (Nosebleed) Discharge Instructions     It is normal to have a small amount of pink/blood tinged mucous oozing after cautery. Sleeping with the head elevated, avoidance of strenuous activity, heavy lifting, and bending over for 2-3 days until septum heals. You may use Vaseline/Aquaphor twice daily to the anterior septum and nasal saline spray 3-5 times daily to help with healing.      If you develop a nosebleed, you can spray Afrin (oxymetazoline nasal spray) in the side of his bleeding.  Apply pressure to the outside of the nose (over the flexible end of the nose) for 15 minutes and lean forward so that you do not swallow blood.  Hold constant, firm pressure for the entire duration without \"peeking\" to see if it has stopped as this will likely result in repeated bleeding.  If you continue to have a nose bleed or if the bleeding is very rapid or excessive, please present to the nearest emergency department emergently for medical evaluation.     In order to decrease your risk for future nosebleeds we recommend the following strategies:     1. Avoid aspirin or other similar medications, unless prescribed by your physician  2. If you are taking Coumadin or other blood thinners, we recommend tight management to avoid excessive blood thinning which can cause recurrent bleeding.  3. Avoid trauma to your nose.  This includes irritation from exploring digits (nose picking) and excessive nose blowing.  4. If you have elevated blood pressure, keeping your blood pressure controlled will decrease your risk for future bleeds.  5. Using a room humidifier at night, especially during the dry winter months will keep your nose moist and less susceptible to bleeding.  6. Use Vaseline or Aquaphor in the nose at nighttime to help with moisture.  7. For those with a history of many severe nose bleeds, it is helpful to irrigate the nose twice daily with normal saline (salt water rinses) to keep the nasal mucosa healthy.  You can " also use AYR saline gel in the nose.     Nosebleeds are very common and regularly occur in otherwise healthy people, however frequent or recurrent severe nose bleeds may be a sign of a serious underlying medical condition.  We recommend that you discuss this episode with your primary care physician so that, if warranted, further testing may be performed.     If you have questions or concerns on any instructions given to you by your provider today or if you need to schedule an appointment, you can reach us at [phone]. Per physician's instructions

## 2020-03-12 NOTE — PROGRESS NOTES
Chief Complaint   Patient presents with     Epistaxis     nosebleeds on right side every other day for about 3 months- lasting 1-2 hours- seen in ER twice and packed with relief     History of Present Illness   Antoine Russo is a 74 year old male presents for nasal evaluation.  The patient was seen in the emergency department on 2/18/2020 with right epistaxis.  The patient does take Coumadin.  His blood pressure was elevated (168/78).   The patient was told to follow-up with ENT for evaluation.      The patient presents with approximately 3 month history of epistaxis. It occurs on the right side. It usually only comes out the front of the nose, but it has ran down the back of the throat at times. The bleeding occurs approximately 3-4 times per week. The patient can get the bleeding to stop by holding pressure. The patient has never gone to the emergency department or required a blood transfusion due to nose bleeding.  The patient has a history of nasal packing twice previously.  The patient has no personal or family history of bleeding disorders. The patient takes warfarin.  The patient does present actively bleeding on the right-hand side.  His systolic blood pressure is 160.    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)     Peripheral vascular disease (H)     Colitis     Groin fluid collection     Clostridium difficile enterocolitis     Health Care Home     H/O colectomy     IPMN (intraductal papillary mucinous neoplasm)     Pancreatic duct leak     Type 2 diabetes  mellitus with diabetic polyneuropathy, without long-term current use of insulin (H)     Left varicocele     Anticipated difficulty with intubation     Spleen absent     Chronic atrial fibrillation     Recurrent pancreatitis     Long term current use of anticoagulant therapy     Acute pancreatitis     Pancreatic pseudocyst     Alcohol dependence, uncomplicated (H)     Acute right-sided low back pain with left-sided sciatica     Spinal stenosis of lumbar region, unspecified whether neurogenic claudication present     Current Medications     Current Outpatient Medications:      ASPIRIN NOT PRESCRIBED (INTENTIONAL), Antiplatelet medication not prescribed intentionally due to Current anticoagulant therapy (warfarin/enoxaparin), Disp: , Rfl:      blood glucose (CONTOUR NEXT TEST) test strip, Use to test blood sugar 2 times daily or as directed., Disp: 100 each, Rfl: 11     Continuous Blood Gluc  (FREESTYLE LISA 14 DAY READER) JOSE ALBERTO, 1 each as needed (use to scan blood sugars from sensor), Disp: 1 Device, Rfl: 0     Continuous Blood Gluc Sensor (FREESTYLE LISA 14 DAY SENSOR) MISC, 1 each every 14 days, Disp: 2 each, Rfl: 11     gabapentin (NEURONTIN) 300 MG capsule, Take 1 capsule (300 mg) by mouth At Bedtime 1 tablet along with his 600mg dose at bedtime. Total gabapentin dose is 600mg/600mg/900mg., Disp: 90 capsule, Rfl: 1     gabapentin (NEURONTIN) 600 MG tablet, Take 1 tablet (600 mg) by mouth 3 times daily Along with 300mg for a total of 900mg for bedtime dose, Disp: 270 tablet, Rfl: 2     insulin glargine U-300 (TOUJEO) 300 UNIT/ML (1 units dial) pen, Inject 78 Units Subcutaneous At Bedtime Order pen needles too., Disp: 9 mL, Rfl: 5     Insulin Lispro (HUMALOG KWIKPEN) 200 UNIT/ML soln, Take 24 units with breakfast, 20 lunch, take 25 units with supper. All plus the high sliding scale.  Max dose: 105 units, Disp: 30 mL, Rfl: 3     insulin pen needle (BD LUIS U/F) 32G X 4 MM miscellaneous, USE PEN NEEDLE ONCE  DAILY AS DIRECTED, Disp: 60 each, Rfl: 0     lisinopril (PRINIVIL/ZESTRIL) 5 MG tablet, Take 1 tablet (5 mg) by mouth daily, Disp: 90 tablet, Rfl: 3     metoprolol succinate ER (TOPROL-XL) 50 MG 24 hr tablet, TAKE 1 & 1/2 (ONE & ONE-HALF) TABLETS BY MOUTH TWICE DAILY, Disp: 270 tablet, Rfl: 3     multivitamin, therapeutic with minerals (THERA-VIT-M) TABS, Take 1 tablet by mouth daily., Disp: 30 each, Rfl: 1     rosuvastatin (CRESTOR) 10 MG tablet, Take 1 tablet (10 mg) by mouth daily, Disp: 90 tablet, Rfl: 3     tamsulosin (FLOMAX) 0.4 MG capsule, TAKE 1 CAPSULE BY MOUTH ONCE DAILY -  NEEDS  BP  RECHECK  NOW, Disp: 90 capsule, Rfl: 1     vitamin B-12 (CYANOCOBALAMIN) 100 MCG tablet, Take 100 mcg by mouth daily, Disp: , Rfl:      warfarin ANTICOAGULANT (COUMADIN) 2.5 MG tablet, Take 1.25 mg (2.5 mg x 0.5) every Mon; 2.5 mg (2.5 mg x 1) all other days or as directed by the Anticoagulation Clinic, Disp: 85 tablet, Rfl: 0    Allergies  Allergies   Allergen Reactions     Nkda [No Known Drug Allergies]        Social History   Social History     Socioeconomic History     Marital status:      Spouse name: Not on file     Number of children: Not on file     Years of education: Not on file     Highest education level: Not on file   Occupational History     Occupation: [employer]     Comment: 20 hr/week monday-Thur 7-noon     Occupation: [place]      Employer: [employer]     Occupation: RETIRED   Social Needs     Financial resource strain: Not on file     Food insecurity     Worry: Not on file     Inability: Not on file     Transportation needs     Medical: Not on file     Non-medical: Not on file   Tobacco Use     Smoking status: Former Smoker     Packs/day: 1.00     Years: 40.00     Pack years: 40.00     Types: Cigarettes     Last attempt to quit: 2006     Years since quittin.3     Smokeless tobacco: Never Used   Substance and Sexual Activity     Alcohol use: Not  "Currently     Comment: \"1 beer on Thanksgiving day\"     Drug use: No     Sexual activity: Yes     Partners: Female   Lifestyle     Physical activity     Days per week: Not on file     Minutes per session: Not on file     Stress: Not on file   Relationships     Social connections     Talks on phone: Not on file     Gets together: Not on file     Attends Yazidi service: Not on file     Active member of club or organization: Not on file     Attends meetings of clubs or organizations: Not on file     Relationship status: Not on file     Intimate partner violence     Fear of current or ex partner: Not on file     Emotionally abused: Not on file     Physically abused: Not on file     Forced sexual activity: Not on file   Other Topics Concern      Service Yes     Comment: [employer] 6 years     Blood Transfusions No     Caffeine Concern Yes     Comment: 0-2 cups     Occupational Exposure No     Comment: retired     Hobby Hazards No     Sleep Concern No     Stress Concern No     Weight Concern No     Special Diet Yes     Comment: healthy     Back Care No     Exercise Yes     Bike Helmet Not Asked     Comment: N/A     Seat Belt Yes     Self-Exams Yes     Parent/sibling w/ CABG, MI or angioplasty before 65F 55M? No   Social History Narrative    Son lives in [place] with 2 grandchildren 19 and 6       Family History  Family History   Problem Relation Age of Onset     Diabetes Sister         onset age 50     Alzheimer Disease Mother          80     Alzheimer Disease Father          85     Diabetes Other         nephew type 1     Diabetes Other      Aneurysm Sister      Anesthesia Reaction No family hx of      Colon Cancer No family hx of      Colon Polyps No family hx of      Crohn's Disease No family hx of      Ulcerative Colitis No family hx of        Review of Systems  As per HPI and PMHx, otherwise 10+ comprehensive system review is negative.    Physical Exam  BP (!) 160/79 (BP Location: Right arm, Patient " Position: Chair, Cuff Size: Adult Regular)   Pulse 83   Temp 98.3  F (36.8  C) (Oral)   Wt 95.3 kg (210 lb)   BMI 30.13 kg/m    GENERAL: Patient is a pleasant, cooperative 74 year old male in no acute distress.  HEAD: Normocephalic, atraumatic.  Hair and scalp are normal.  EYES: Pupils are equal, round, reactive to light and accommodation.  Extraocular movements are intact.  The sclera nonicteric without injection.  The extraocular structures are normal.  EARS: Normal shape and symmetry.  No tenderness when palpating the mastoid or tragal areas bilaterally.    NOSE: The patient has his nose packed bilaterally with cotton.  The nasal packs are removed.  There is active bleeding on the right-hand side.  There is a leftward nasal septal deviation.  I did place lidocaine and phenylephrine soaked pledgets in the right nasal cavity.  No nasal cavity masses, polyps, or mucopurulence on anterior rhinoscopy.    NEUROLOGIC: Cranial nerves II through XII are grossly intact.  Voice is strong.  Patient is House-Brackman I/VI bilaterally.  CARDIOVASCULAR: Extremities are warm and well-perfused.  No significant peripheral edema.  RESPIRATORY: Patient has nonlabored breathing without cough, wheeze, stridor.  PSYCHIATRIC: Patient is alert and oriented.  Mood and affect appear normal.  SKIN: Warm and dry.  No scalp, face, or neck lesions noted.    Procedure: Endoscopic control of right anterior nasal hemorrhage  Indication: Recurrent epistaxis     Phenylephrine and lidocaine on pledgets were placed in the right nasal cavity.  The pledgets were removed.  The 0 degree rigid endoscope was used to visualize the right nasal cavity.  There were several prominent vessels on the right-hand side with active bleeding.  I was able to control these with silver nitrate.  There is also a superior blood vessel near the middle turbinate on the septum that was actively bleeding.  This was controlled with silver nitrate cautery.  Hemostasis was  achieved.  Surgicel was applied to the septum to help healing.  Patient tolerated the procedure well.      Assessment and Plan    ICD-10-CM    1. Recurrent epistaxis  R04.0    2. Essential hypertension  I10    3. Chronic anticoagulation  Z79.01       It was my pleasure seeing Antoine Russo today in clinic.  The patient presents with epistaxis. This is almost certainly coming from the right anterior septum. We discussed restrictions on manipulation and picking of the nose.  Also, sleeping with the head elevated, and avoidance of strenuous activity, heavy lifting, and bending over until the septum heals. I explained using vaseline twice daily to the anterior septum, nasal saline spray 3-5 times per day, and a humidifier at the bedside at night.    I also explained applying digital pressure to the nasal tip for a minimum of 15-20 minutes to stop a nose bleed. If that doesn't stop the bleeding the patient needs to proceed to the nearest emergency department. I will see the patient back in 2-4 weeks.     Poli to follow up with Primary Care provider regarding elevated blood pressure.    David Platt MD  Department of Otolarygology-Head and Neck Surgery  [facility]

## 2020-03-15 ENCOUNTER — HEALTH MAINTENANCE LETTER (OUTPATIENT)
Age: 75
End: 2020-03-15

## 2020-03-16 ENCOUNTER — OFFICE VISIT (OUTPATIENT)
Dept: OTOLARYNGOLOGY | Facility: CLINIC | Age: 75
End: 2020-03-16
Payer: COMMERCIAL

## 2020-03-16 ENCOUNTER — TELEPHONE (OUTPATIENT)
Dept: EDUCATION SERVICES | Facility: CLINIC | Age: 75
End: 2020-03-16

## 2020-03-16 ENCOUNTER — ANTICOAGULATION THERAPY VISIT (OUTPATIENT)
Dept: ANTICOAGULATION | Facility: CLINIC | Age: 75
End: 2020-03-16
Payer: COMMERCIAL

## 2020-03-16 VITALS
TEMPERATURE: 98.3 F | HEART RATE: 83 BPM | BODY MASS INDEX: 30.13 KG/M2 | WEIGHT: 210 LBS | SYSTOLIC BLOOD PRESSURE: 159 MMHG | DIASTOLIC BLOOD PRESSURE: 77 MMHG

## 2020-03-16 DIAGNOSIS — Z79.01 LONG TERM CURRENT USE OF ANTICOAGULANT THERAPY: ICD-10-CM

## 2020-03-16 DIAGNOSIS — I48.20 CHRONIC ATRIAL FIBRILLATION (H): ICD-10-CM

## 2020-03-16 DIAGNOSIS — Z79.01 CHRONIC ANTICOAGULATION: ICD-10-CM

## 2020-03-16 DIAGNOSIS — R04.0 RECURRENT EPISTAXIS: Primary | ICD-10-CM

## 2020-03-16 DIAGNOSIS — E11.42 TYPE 2 DIABETES MELLITUS WITH DIABETIC POLYNEUROPATHY, WITHOUT LONG-TERM CURRENT USE OF INSULIN (H): Chronic | ICD-10-CM

## 2020-03-16 DIAGNOSIS — I10 ESSENTIAL HYPERTENSION: ICD-10-CM

## 2020-03-16 LAB — INR POINT OF CARE: 4.1 (ref 0.86–1.14)

## 2020-03-16 PROCEDURE — 99207 ZZC NO CHARGE NURSE ONLY: CPT

## 2020-03-16 PROCEDURE — 99214 OFFICE O/P EST MOD 30 MIN: CPT | Mod: 25 | Performed by: OTOLARYNGOLOGY

## 2020-03-16 PROCEDURE — 36416 COLLJ CAPILLARY BLOOD SPEC: CPT

## 2020-03-16 PROCEDURE — 31238 NSL/SINS NDSC SRG NSL HEMRRG: CPT | Mod: RT | Performed by: OTOLARYNGOLOGY

## 2020-03-16 PROCEDURE — 85610 PROTHROMBIN TIME: CPT | Mod: QW

## 2020-03-16 RX ORDER — FLASH GLUCOSE SENSOR
1 KIT MISCELLANEOUS
Qty: 2 EACH | Refills: 11 | Status: SHIPPED | OUTPATIENT
Start: 2020-03-16 | End: 2021-07-13

## 2020-03-16 RX ORDER — WARFARIN SODIUM 2.5 MG/1
TABLET ORAL
Qty: 85 TABLET | Refills: 0
Start: 2020-03-16 | End: 2020-03-26

## 2020-03-16 RX ORDER — FLASH GLUCOSE SCANNING READER
1 EACH MISCELLANEOUS PRN
Qty: 1 DEVICE | Refills: 0 | Status: SHIPPED | OUTPATIENT
Start: 2020-03-16 | End: 2020-12-29

## 2020-03-16 ASSESSMENT — PAIN SCALES - GENERAL: PAINLEVEL: NO PAIN (0)

## 2020-03-16 NOTE — NURSING NOTE
"Initial BP (!) 160/79 (BP Location: Right arm, Patient Position: Chair, Cuff Size: Adult Regular)   Pulse 83   Temp 98.3  F (36.8  C) (Oral)   Wt 95.3 kg (210 lb)   BMI 30.13 kg/m   Estimated body mass index is 30.13 kg/m  as calculated from the following:    Height as of 2/18/20: 1.778 m (5' 10\").    Weight as of this encounter: 95.3 kg (210 lb). .    Aileen Nieto LPN    "

## 2020-03-16 NOTE — TELEPHONE ENCOUNTER
Diabetes education contact:    Sent rx for jake 14 day to [facility] to see if covered.     Ruthann Alaniz RD, CDE

## 2020-03-16 NOTE — LETTER
3/16/2020         RE: Antoine Russo  [address] [address]        Dear Colleague,    Thank you for referring your patient, Antoine Russo, to the [facility]. Please see a copy of my visit note below.    Chief Complaint   Patient presents with     Epistaxis     nosebleeds on right side every other day for about 3 months- lasting 1-2 hours- seen in ER twice and packed with relief     History of Present Illness   Antoine Russo is a 74 year old male presents for nasal evaluation.  The patient was seen in the emergency department on 2/18/2020 with right epistaxis.  The patient does take Coumadin.  His blood pressure was elevated (168/78).   The patient was told to follow-up with ENT for evaluation.      The patient presents with approximately 3 month history of epistaxis. It occurs on the right side. It usually only comes out the front of the nose, but it has ran down the back of the throat at times. The bleeding occurs approximately 3-4 times per week. The patient can get the bleeding to stop by holding pressure. The patient has never gone to the emergency department or required a blood transfusion due to nose bleeding.  The patient has a history of nasal packing twice previously.  The patient has no personal or family history of bleeding disorders. The patient takes warfarin.  The patient does present actively bleeding on the right-hand side.  His systolic blood pressure is 160.    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)      Peripheral vascular disease (H)     Colitis     Groin fluid collection     Clostridium difficile enterocolitis     Health Care Home     H/O colectomy     IPMN (intraductal papillary mucinous neoplasm)     Pancreatic duct leak     Type 2 diabetes mellitus with diabetic polyneuropathy, without long-term current use of insulin (H)     Left varicocele     Anticipated difficulty with intubation     Spleen absent     Chronic atrial fibrillation     Recurrent pancreatitis     Long term current use of anticoagulant therapy     Acute pancreatitis     Pancreatic pseudocyst     Alcohol dependence, uncomplicated (H)     Acute right-sided low back pain with left-sided sciatica     Spinal stenosis of lumbar region, unspecified whether neurogenic claudication present     Current Medications     Current Outpatient Medications:      ASPIRIN NOT PRESCRIBED (INTENTIONAL), Antiplatelet medication not prescribed intentionally due to Current anticoagulant therapy (warfarin/enoxaparin), Disp: , Rfl:      blood glucose (CONTOUR NEXT TEST) test strip, Use to test blood sugar 2 times daily or as directed., Disp: 100 each, Rfl: 11     Continuous Blood Gluc  (FREESTYLE LISA 14 DAY READER) JOSE ALBERTO, 1 each as needed (use to scan blood sugars from sensor), Disp: 1 Device, Rfl: 0     Continuous Blood Gluc Sensor (FREESTYLE LISA 14 DAY SENSOR) MISC, 1 each every 14 days, Disp: 2 each, Rfl: 11     gabapentin (NEURONTIN) 300 MG capsule, Take 1 capsule (300 mg) by mouth At Bedtime 1 tablet along with his 600mg dose at bedtime. Total gabapentin dose is 600mg/600mg/900mg., Disp: 90 capsule, Rfl: 1     gabapentin (NEURONTIN) 600 MG tablet, Take 1 tablet (600 mg) by mouth 3 times daily Along with 300mg for a total of 900mg for bedtime dose, Disp: 270 tablet, Rfl: 2     insulin glargine U-300 (TOUJEO) 300 UNIT/ML (1 units dial) pen, Inject 78 Units Subcutaneous At Bedtime Order pen needles too., Disp: 9 mL, Rfl: 5     Insulin Lispro (HUMALOG  KWIKPEN) 200 UNIT/ML soln, Take 24 units with breakfast, 20 lunch, take 25 units with supper. All plus the high sliding scale.  Max dose: 105 units, Disp: 30 mL, Rfl: 3     insulin pen needle (BD LUIS U/F) 32G X 4 MM miscellaneous, USE PEN NEEDLE ONCE DAILY AS DIRECTED, Disp: 60 each, Rfl: 0     lisinopril (PRINIVIL/ZESTRIL) 5 MG tablet, Take 1 tablet (5 mg) by mouth daily, Disp: 90 tablet, Rfl: 3     metoprolol succinate ER (TOPROL-XL) 50 MG 24 hr tablet, TAKE 1 & 1/2 (ONE & ONE-HALF) TABLETS BY MOUTH TWICE DAILY, Disp: 270 tablet, Rfl: 3     multivitamin, therapeutic with minerals (THERA-VIT-M) TABS, Take 1 tablet by mouth daily., Disp: 30 each, Rfl: 1     rosuvastatin (CRESTOR) 10 MG tablet, Take 1 tablet (10 mg) by mouth daily, Disp: 90 tablet, Rfl: 3     tamsulosin (FLOMAX) 0.4 MG capsule, TAKE 1 CAPSULE BY MOUTH ONCE DAILY -  NEEDS  BP  RECHECK  NOW, Disp: 90 capsule, Rfl: 1     vitamin B-12 (CYANOCOBALAMIN) 100 MCG tablet, Take 100 mcg by mouth daily, Disp: , Rfl:      warfarin ANTICOAGULANT (COUMADIN) 2.5 MG tablet, Take 1.25 mg (2.5 mg x 0.5) every Mon; 2.5 mg (2.5 mg x 1) all other days or as directed by the Anticoagulation Clinic, Disp: 85 tablet, Rfl: 0    Allergies  Allergies   Allergen Reactions     Nkda [No Known Drug Allergies]        Social History   Social History     Socioeconomic History     Marital status:      Spouse name: Not on file     Number of children: Not on file     Years of education: Not on file     Highest education level: Not on file   Occupational History     Occupation: [employer]     Comment: 20 hr/week monday-Thur 7-noon     Occupation: [place]      Employer: [employer]     Occupation: RETIRED   Social Needs     Financial resource strain: Not on file     Food insecurity     Worry: Not on file     Inability: Not on file     Transportation needs     Medical: Not on file     Non-medical: Not on file   Tobacco Use     Smoking status:  "Former Smoker     Packs/day: 1.00     Years: 40.00     Pack years: 40.00     Types: Cigarettes     Last attempt to quit: 2006     Years since quittin.3     Smokeless tobacco: Never Used   Substance and Sexual Activity     Alcohol use: Not Currently     Comment: \"1 beer on Thanksgiving day\"     Drug use: No     Sexual activity: Yes     Partners: Female   Lifestyle     Physical activity     Days per week: Not on file     Minutes per session: Not on file     Stress: Not on file   Relationships     Social connections     Talks on phone: Not on file     Gets together: Not on file     Attends Evangelical service: Not on file     Active member of club or organization: Not on file     Attends meetings of clubs or organizations: Not on file     Relationship status: Not on file     Intimate partner violence     Fear of current or ex partner: Not on file     Emotionally abused: Not on file     Physically abused: Not on file     Forced sexual activity: Not on file   Other Topics Concern      Service Yes     Comment: [employer] 6 years     Blood Transfusions No     Caffeine Concern Yes     Comment: 0-2 cups     Occupational Exposure No     Comment: retired     Hobby Hazards No     Sleep Concern No     Stress Concern No     Weight Concern No     Special Diet Yes     Comment: healthy     Back Care No     Exercise Yes     Bike Helmet Not Asked     Comment: N/A     Seat Belt Yes     Self-Exams Yes     Parent/sibling w/ CABG, MI or angioplasty before 65F 55M? No   Social History Narrative    Son lives in [place] with 2 grandchildren 19 and 6       Family History  Family History   Problem Relation Age of Onset     Diabetes Sister         onset age 50     Alzheimer Disease Mother          80     Alzheimer Disease Father          85     Diabetes Other         nephew type 1     Diabetes Other      Aneurysm Sister      Anesthesia Reaction No family hx of      Colon Cancer No family hx of      Colon Polyps No family " hx of      Crohn's Disease No family hx of      Ulcerative Colitis No family hx of        Review of Systems  As per HPI and PMHx, otherwise 10+ comprehensive system review is negative.    Physical Exam  BP (!) 160/79 (BP Location: Right arm, Patient Position: Chair, Cuff Size: Adult Regular)   Pulse 83   Temp 98.3  F (36.8  C) (Oral)   Wt 95.3 kg (210 lb)   BMI 30.13 kg/m    GENERAL: Patient is a pleasant, cooperative 74 year old male in no acute distress.  HEAD: Normocephalic, atraumatic.  Hair and scalp are normal.  EYES: Pupils are equal, round, reactive to light and accommodation.  Extraocular movements are intact.  The sclera nonicteric without injection.  The extraocular structures are normal.  EARS: Normal shape and symmetry.  No tenderness when palpating the mastoid or tragal areas bilaterally.    NOSE: The patient has his nose packed bilaterally with cotton.  The nasal packs are removed.  There is active bleeding on the right-hand side.  There is a leftward nasal septal deviation.  I did place lidocaine and phenylephrine soaked pledgets in the right nasal cavity.  No nasal cavity masses, polyps, or mucopurulence on anterior rhinoscopy.    NEUROLOGIC: Cranial nerves II through XII are grossly intact.  Voice is strong.  Patient is House-Brackman I/VI bilaterally.  CARDIOVASCULAR: Extremities are warm and well-perfused.  No significant peripheral edema.  RESPIRATORY: Patient has nonlabored breathing without cough, wheeze, stridor.  PSYCHIATRIC: Patient is alert and oriented.  Mood and affect appear normal.  SKIN: Warm and dry.  No scalp, face, or neck lesions noted.    Procedure: Endoscopic control of right anterior nasal hemorrhage  Indication: Recurrent epistaxis     Phenylephrine and lidocaine on pledgets were placed in the right nasal cavity.  The pledgets were removed.  The 0 degree rigid endoscope was used to visualize the right nasal cavity.  There were several prominent vessels on the right-hand  side with active bleeding.  I was able to control these with silver nitrate.  There is also a superior blood vessel near the middle turbinate on the septum that was actively bleeding.  This was controlled with silver nitrate cautery.  Hemostasis was achieved.  Surgicel was applied to the septum to help healing.  Patient tolerated the procedure well.      Assessment and Plan    ICD-10-CM    1. Recurrent epistaxis  R04.0    2. Essential hypertension  I10    3. Chronic anticoagulation  Z79.01       It was my pleasure seeing Antoine Russo today in clinic.  The patient presents with epistaxis. This is almost certainly coming from the right anterior septum. We discussed restrictions on manipulation and picking of the nose.  Also, sleeping with the head elevated, and avoidance of strenuous activity, heavy lifting, and bending over until the septum heals. I explained using vaseline twice daily to the anterior septum, nasal saline spray 3-5 times per day, and a humidifier at the bedside at night.    I also explained applying digital pressure to the nasal tip for a minimum of 15-20 minutes to stop a nose bleed. If that doesn't stop the bleeding the patient needs to proceed to the nearest emergency department. I will see the patient back in 2-4 weeks.     Poli to follow up with Primary Care provider regarding elevated blood pressure.    David Platt MD  Department of Otolarygology-Head and Neck Surgery  [facility]       Again, thank you for allowing me to participate in the care of your patient.        Sincerely,        David Platt MD

## 2020-03-16 NOTE — PROGRESS NOTES
ANTICOAGULATION FOLLOW-UP CLINIC VISIT    Patient Name:  Antoine Russo  Date:  3/16/2020  Contact Type:  Face to Face    SUBJECTIVE:  Patient Findings     Positives:   Signs/symptoms of bleeding (recurrent nosebleeds, saw ENT this AM)    Comments:   Patient saw ENT this morning for his recurrent nosebleeds, see office visit note. No new medication changes. Patient does continue to have back pain, he states it has not improved and he likely will need to have surgery in the future but no date has been set. Patient will hold his dose of warfarin today, take a decreased dose tomorrow and then will start a new maintenance dose of 15 mg weekly (7% decrease) and recheck INR in 2 weeks. Patient reports no other bleeding/bruising concerns at this time (the ENT was able to stop his nosebleed at his appt). Reviewed signs/symptoms of bleeding and when to seek immediate medical attention.         Clinical Outcomes     Comments:   Patient saw ENT this morning for his recurrent nosebleeds, see office visit note. No new medication changes. Patient does continue to have back pain, he states it has not improved and he likely will need to have surgery in the future but no date has been set. Patient will hold his dose of warfarin today, take a decreased dose tomorrow and then will start a new maintenance dose of 15 mg weekly (7% decrease) and recheck INR in 2 weeks. Patient reports no other bleeding/bruising concerns at this time (the ENT was able to stop his nosebleed at his appt). Reviewed signs/symptoms of bleeding and when to seek immediate medical attention.            OBJECTIVE    INR Protime   Date Value Ref Range Status   2020 4.1 (A) 0.86 - 1.14 Final       ASSESSMENT / PLAN  INR assessment SUPRA    Recheck INR In: 2 WEEKS    INR Location Clinic      Anticoagulation Summary  As of 3/16/2020    INR goal:   2.0-3.0   TTR:   48.1 % (10.5 mo)   INR used for dosin.1! (3/16/2020)   Warfarin maintenance plan:   1.25  mg (2.5 mg x 0.5) every Mon, Fri; 2.5 mg (2.5 mg x 1) all other days   Full warfarin instructions:   3/16: Hold; 3/17: 1.25 mg; 3/27: 1.25 mg; Otherwise 1.25 mg every Mon, Fri; 2.5 mg all other days   Weekly warfarin total:   15 mg   Plan last modified:   Maria Victoria Castaneda RN (3/16/2020)   Next INR check:   3/30/2020   Priority:   Maintenance   Target end date:   Indefinite    Indications    Chronic atrial fibrillation [I48.20]  Long term current use of anticoagulant therapy [Z79.01]             Anticoagulation Episode Summary     INR check location:       Preferred lab:       Send INR reminders to:   [facility]    Comments:   * 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 No bandaid. 2-3 light beers daily      Anticoagulation Care Providers     Provider Role Specialty Phone number    Katie Martino MD [facility] [phone]            See the Encounter Report to view Anticoagulation Flowsheet and Dosing Calendar (Go to Encounters tab in chart review, and find the Anticoagulation Therapy Visit)      Maria Victoria Castaneda RN

## 2020-03-20 DIAGNOSIS — I48.20 CHRONIC ATRIAL FIBRILLATION (H): Primary | ICD-10-CM

## 2020-03-26 ENCOUNTER — ANTICOAGULATION THERAPY VISIT (OUTPATIENT)
Dept: ANTICOAGULATION | Facility: CLINIC | Age: 75
End: 2020-03-26

## 2020-03-26 DIAGNOSIS — Z79.01 LONG TERM CURRENT USE OF ANTICOAGULANT THERAPY: ICD-10-CM

## 2020-03-26 DIAGNOSIS — I48.20 CHRONIC ATRIAL FIBRILLATION (H): ICD-10-CM

## 2020-03-26 LAB — CAPILLARY BLOOD COLLECTION: NORMAL

## 2020-03-26 PROCEDURE — 85610 PROTHROMBIN TIME: CPT | Performed by: FAMILY MEDICINE

## 2020-03-26 PROCEDURE — 36416 COLLJ CAPILLARY BLOOD SPEC: CPT | Performed by: FAMILY MEDICINE

## 2020-03-26 PROCEDURE — 99207 ZZC NO CHARGE NURSE ONLY: CPT

## 2020-03-26 RX ORDER — WARFARIN SODIUM 2.5 MG/1
TABLET ORAL
Qty: 85 TABLET | Refills: 0
Start: 2020-03-26 | End: 2020-06-08

## 2020-03-26 NOTE — PROGRESS NOTES
ANTICOAGULATION FOLLOW-UP CLINIC VISIT    Patient Name:  Antoine Russo  Date:  3/26/2020  Contact Type:  Telephone/ spoke with Poli on phone. Try call after appts on 4-15 (he has 2 other appts after INR draw)    SUBJECTIVE:  Patient Findings     Positives:   Missed doses (intentional hold last week)    Comments:   Patient has not had any further nosebleeds since last INR and seeing ENT. No changes in diet, activity level, medications (including over the counter), or health. No missed doses of warfarin. Patient took dosing as prescribed. No signs of clots or bleeding concerns. Patient will resume prior maintenance warfarin dosing as he is currently in range on that dose today.          Clinical Outcomes     Negatives:   Major bleeding event, Thromboembolic event, Anticoagulation-related hospital admission, Anticoagulation-related ED visit, Anticoagulation-related fatality    Comments:   Patient has not had any further nosebleeds since last INR and seeing ENT. No changes in diet, activity level, medications (including over the counter), or health. No missed doses of warfarin. Patient took dosing as prescribed. No signs of clots or bleeding concerns. Patient will resume prior maintenance warfarin dosing as he is currently in range on that dose today.             OBJECTIVE    INR   Date Value Ref Range Status   2020 2.20 (H) 0.86 - 1.14 Final       ASSESSMENT / PLAN  INR assessment THER    Recheck INR In: 3 WEEKS    INR Location Clinic      Anticoagulation Summary  As of 3/26/2020    INR goal:   2.0-3.0   TTR:   47.3 % (10.7 mo)   INR used for dosin.20 (3/26/2020)   Warfarin maintenance plan:   1.25 mg (2.5 mg x 0.5) every Mon; 2.5 mg (2.5 mg x 1) all other days   Full warfarin instructions:   1.25 mg every Mon; 2.5 mg all other days   Weekly warfarin total:   16.25 mg   Plan last modified:   Aparna Kunz RN (3/26/2020)   Next INR check:   4/15/2020   Priority:   Maintenance   Target end date:    Indefinite    Indications    Chronic atrial fibrillation [I48.20]  Long term current use of anticoagulant therapy [Z79.01]             Anticoagulation Episode Summary     INR check location:       Preferred lab:       Send INR reminders to:   [facility]    Comments:   * 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 No bandaid. 2-3 light beers daily      Anticoagulation Care Providers     Provider Role Specialty Phone number    Katie Martino MD [facility] [phone]            See the Encounter Report to view Anticoagulation Flowsheet and Dosing Calendar (Go to Encounters tab in chart review, and find the Anticoagulation Therapy Visit)        Aparna Kunz RN

## 2020-03-27 LAB — INR PPP: 2.2 (ref 0.86–1.14)

## 2020-04-13 ENCOUNTER — TELEPHONE (OUTPATIENT)
Dept: EDUCATION SERVICES | Facility: CLINIC | Age: 75
End: 2020-04-13

## 2020-04-13 DIAGNOSIS — E11.42 TYPE 2 DIABETES MELLITUS WITH DIABETIC POLYNEUROPATHY, WITHOUT LONG-TERM CURRENT USE OF INSULIN (H): Primary | Chronic | ICD-10-CM

## 2020-04-13 RX ORDER — PROCHLORPERAZINE 25 MG/1
SUPPOSITORY RECTAL
Qty: 3 EACH | Refills: 11 | Status: SHIPPED | OUTPATIENT
Start: 2020-04-13 | End: 2020-07-27

## 2020-04-13 RX ORDER — PROCHLORPERAZINE 25 MG/1
SUPPOSITORY RECTAL
Qty: 1 EACH | Refills: 3 | Status: SHIPPED | OUTPATIENT
Start: 2020-04-13 | End: 2020-07-27

## 2020-04-13 RX ORDER — PROCHLORPERAZINE 25 MG/1
SUPPOSITORY RECTAL
Qty: 1 EACH | Refills: 0 | Status: SHIPPED | OUTPATIENT
Start: 2020-04-13 | End: 2020-07-27

## 2020-04-13 NOTE — TELEPHONE ENCOUNTER
Dr. Martino,    Pt is concerned over cost of Toujeo and other basal insulins.  He has found out that Relion insulin would be cheaper and wants to try this.    Suggest Relion NPH:  38 units in the am and 38 units in the pm. He would start this in the evening since that is when he takes the toujeo.    He would watch his numbers carefully as we may need to make further adjustments in the insulin to get numbers back in goal range.      Are you ok with this plan?    Thanks! Ruthann Alaniz RD, CDE

## 2020-04-13 NOTE — TELEPHONE ENCOUNTER
Diabetes education contact:    PT is concerned over cost of Toujeo and wants to check on other insulins.  He also wants to try and see if he can get the dexcom sensor instead of jake.  Will send in and try for this.    Hi, 4-7 134/112/156 (after meal), 4-8 124/109/104, 4-9 161/137/96, 4-10 145/117/124, 4-11 133/77/OOPS, 4-12 116/124/136...Have been doing some checking due to the high cost of the Toujeo insulin, Can get Reli-on Insullin for much cheaper. Don't know what type (N, R, or 7030). Also, Dexcom G6 for checking glucose would be better than the Jake. I have talked to the insurance reps. and they told me both of these are covered. Maybe the good Doctor could change my prescriptions to these? Thanks halima....Saleem....      Ruthann Alaniz RD, CDE

## 2020-04-21 RX ORDER — HUMAN INSULIN 100 [IU]/ML
INJECTION, SUSPENSION SUBCUTANEOUS
Qty: 30 ML | Refills: 5 | Status: SHIPPED | OUTPATIENT
Start: 2020-04-21 | End: 2020-05-12

## 2020-04-24 ENCOUNTER — ANTICOAGULATION THERAPY VISIT (OUTPATIENT)
Dept: ANTICOAGULATION | Facility: CLINIC | Age: 75
End: 2020-04-24

## 2020-04-24 DIAGNOSIS — I48.20 CHRONIC ATRIAL FIBRILLATION (H): ICD-10-CM

## 2020-04-24 DIAGNOSIS — Z79.01 LONG TERM CURRENT USE OF ANTICOAGULANT THERAPY: ICD-10-CM

## 2020-04-24 LAB
CAPILLARY BLOOD COLLECTION: NORMAL
INR PPP: 2.9 (ref 0.86–1.14)

## 2020-04-24 PROCEDURE — 36416 COLLJ CAPILLARY BLOOD SPEC: CPT | Performed by: FAMILY MEDICINE

## 2020-04-24 PROCEDURE — 99207 ZZC NO CHARGE NURSE ONLY: CPT

## 2020-04-24 PROCEDURE — 85610 PROTHROMBIN TIME: CPT | Performed by: FAMILY MEDICINE

## 2020-04-24 NOTE — PROGRESS NOTES
ANTICOAGULATION FOLLOW-UP CLINIC VISIT    Patient Name:  Antoine Russo  Date:  2020  Contact Type:  Telephone    SUBJECTIVE:  Patient Findings     Positives:   Change in medications (insulin change - one brand to another)    Comments:   No changes in activity or diet noted. No concerns with clotting, bleeding, or increased bruising noted. Took warfarin as prescribed.  Patient is to continue maintenance warfarin plan, and check INR in 4 weeks.  Patient verbalizes understanding and agrees to plan. No further questions or concerns.        Clinical Outcomes     Negatives:   Major bleeding event, Thromboembolic event, Anticoagulation-related hospital admission, Anticoagulation-related ED visit, Anticoagulation-related fatality    Comments:   No changes in activity or diet noted. No concerns with clotting, bleeding, or increased bruising noted. Took warfarin as prescribed.  Patient is to continue maintenance warfarin plan, and check INR in 4 weeks.  Patient verbalizes understanding and agrees to plan. No further questions or concerns.           OBJECTIVE    INR   Date Value Ref Range Status   2020 2.90 (H) 0.86 - 1.14 Final     Comment:     This test is intended for monitoring Coumadin therapy.  Results are not   accurate in patients with prolonged INR due to factor deficiency.         ASSESSMENT / PLAN  INR assessment THER    Recheck INR In: 4 WEEKS    INR Location Clinic      Anticoagulation Summary  As of 2020    INR goal:   2.0-3.0   TTR:   51.7 % (11.7 mo)   INR used for dosin.90 (2020)   Warfarin maintenance plan:   1.25 mg (2.5 mg x 0.5) every Mon; 2.5 mg (2.5 mg x 1) all other days   Full warfarin instructions:   1.25 mg every Mon; 2.5 mg all other days   Weekly warfarin total:   16.25 mg   No change documented:   Madhavi Sanders RN   Plan last modified:   Aparna Kunz RN (3/26/2020)   Next INR check:   2020   Priority:   Maintenance   Target end date:   Indefinite     Indications    Chronic atrial fibrillation [I48.20]  Long term current use of anticoagulant therapy [Z79.01]             Anticoagulation Episode Summary     INR check location:       Preferred lab:       Send INR reminders to:   [facility]    Comments:   * 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 No bandaid. 2-3 light beers daily. Call after other appts on 4-15-20 as pt will be tied up      Anticoagulation Care Providers     Provider Role Specialty Phone number    Katie Martino MD [facility] [phone]            See the Encounter Report to view Anticoagulation Flowsheet and Dosing Calendar (Go to Encounters tab in chart review, and find the Anticoagulation Therapy Visit)        Madhavi Sanders RN

## 2020-05-12 ENCOUNTER — OFFICE VISIT (OUTPATIENT)
Dept: FAMILY MEDICINE | Facility: CLINIC | Age: 75
End: 2020-05-12
Payer: COMMERCIAL

## 2020-05-12 VITALS
HEIGHT: 70 IN | RESPIRATION RATE: 18 BRPM | HEART RATE: 72 BPM | OXYGEN SATURATION: 93 % | WEIGHT: 215.4 LBS | SYSTOLIC BLOOD PRESSURE: 134 MMHG | BODY MASS INDEX: 30.84 KG/M2 | TEMPERATURE: 97.3 F | DIASTOLIC BLOOD PRESSURE: 60 MMHG

## 2020-05-12 DIAGNOSIS — G62.9 PERIPHERAL POLYNEUROPATHY: ICD-10-CM

## 2020-05-12 DIAGNOSIS — I48.20 CHRONIC ATRIAL FIBRILLATION (H): ICD-10-CM

## 2020-05-12 DIAGNOSIS — E11.42 TYPE 2 DIABETES MELLITUS WITH DIABETIC POLYNEUROPATHY, WITHOUT LONG-TERM CURRENT USE OF INSULIN (H): Primary | ICD-10-CM

## 2020-05-12 DIAGNOSIS — M48.061 SPINAL STENOSIS OF LUMBAR REGION, UNSPECIFIED WHETHER NEUROGENIC CLAUDICATION PRESENT: ICD-10-CM

## 2020-05-12 DIAGNOSIS — M54.40 CHRONIC LOW BACK PAIN WITH SCIATICA, SCIATICA LATERALITY UNSPECIFIED, UNSPECIFIED BACK PAIN LATERALITY: ICD-10-CM

## 2020-05-12 DIAGNOSIS — G89.29 CHRONIC LOW BACK PAIN WITH SCIATICA, SCIATICA LATERALITY UNSPECIFIED, UNSPECIFIED BACK PAIN LATERALITY: ICD-10-CM

## 2020-05-12 DIAGNOSIS — I10 HYPERTENSION, BENIGN ESSENTIAL, GOAL BELOW 140/90: ICD-10-CM

## 2020-05-12 DIAGNOSIS — E78.5 HYPERLIPIDEMIA LDL GOAL <100: ICD-10-CM

## 2020-05-12 LAB — HBA1C MFR BLD: 6.3 % (ref 0–5.6)

## 2020-05-12 PROCEDURE — 83036 HEMOGLOBIN GLYCOSYLATED A1C: CPT | Performed by: FAMILY MEDICINE

## 2020-05-12 PROCEDURE — 36415 COLL VENOUS BLD VENIPUNCTURE: CPT | Performed by: FAMILY MEDICINE

## 2020-05-12 PROCEDURE — 99214 OFFICE O/P EST MOD 30 MIN: CPT | Performed by: FAMILY MEDICINE

## 2020-05-12 RX ORDER — HUMAN INSULIN 100 [IU]/ML
INJECTION, SUSPENSION SUBCUTANEOUS
Qty: 30 ML | Refills: 5
Start: 2020-05-12 | End: 2020-11-24

## 2020-05-12 ASSESSMENT — MIFFLIN-ST. JEOR: SCORE: 1734.55

## 2020-05-12 NOTE — PROGRESS NOTES
Subjective     Antoine Russo is a 74 year old male who presents to clinic today for the following health issues:    HPI   Diabetes Follow-up    How often are you checking your blood sugar? Three times daily  Blood sugar testing frequency justification:  Frequent hypoglycemia and Adjustment of medication(s)  What time of day are you checking your blood sugars (select all that apply)?  Before meals  Have you had any blood sugars above 200?  No  Have you had any blood sugars below 70?  Yes 52 last week    What symptoms do you notice when your blood sugar is low?  Weak, Lethargy and Other: wooziness    What concerns do you have today about your diabetes? None and Low blood sugar     Do you have any of these symptoms? (Select all that apply)  Numbness in feet and Burning in feet    Have you had a diabetic eye exam in the last 12 months? No    Lab Results   Component Value Date    A1C 6.3 05/12/2020    A1C 6.5 02/04/2020    A1C 9.0 10/17/2019    A1C 9.7 07/18/2019    A1C 11.6 04/16/2019      on insulin, Rosario 36 in am, 34 pm    Hyperlipidemia Follow-Up      Are you regularly taking any medication or supplement to lower your cholesterol?   Yes- rosuvastatin (CRESTOR) 10 MG tablet     Are you having muscle aches or other side effects that you think could be caused by your cholesterol lowering medication?  No  Recent Labs   Lab Test 02/04/20  0836 05/22/19  1159  04/14/15  0853 05/15/14  0832   CHOL 143 151   < > 144 165   HDL 45 31*   < > 27* 30*   LDL 74 82   < > 76 75   TRIG 121 188*   < > 206* 302*   CHOLHDLRATIO  --   --   --  5.3* 5.0    < > = values in this interval not displayed.      Back pain  Is worse. He has been going to [place], his surgery was postponed for now because of COVID  Has pain going down the left leg.     Hypertension Follow-up      Do you check your blood pressure regularly outside of the clinic? No     Are you following a low salt diet? Yes    Are your blood pressures ever more than 140 on the  "top number (systolic) OR more   than 90 on the bottom number (diastolic), for example 140/90? No  On metoprolol, lisinopril  BP Readings from Last 6 Encounters:   05/12/20 134/60   03/16/20 (!) 159/77   02/18/20 (!) 167/77   02/04/20 132/80   01/25/20 (!) 164/78   01/22/20 (!) 161/86      BP Readings from Last 2 Encounters:   05/12/20 134/60   03/16/20 (!) 159/77     Hemoglobin A1C (%)   Date Value   05/12/2020 6.3 (H)   02/04/2020 6.5 (H)     LDL Cholesterol Calculated (mg/dL)   Date Value   02/04/2020 74   05/22/2019 82         How many servings of fruits and vegetables do you eat daily?  2-3    On average, how many sweetened beverages do you drink each day (Examples: soda, juice, sweet tea, etc.  Do NOT count diet or artificially sweetened beverages)?   0    How many days per week do you exercise enough to make your heart beat faster? 3 or less    How many minutes a day do you exercise enough to make your heart beat faster? 9 or less    How many days per week do you miss taking your medication? 0    afib  On metoprolol and warfarin      BP Readings from Last 3 Encounters:   05/12/20 134/60   03/16/20 (!) 159/77   02/18/20 (!) 167/77    Wt Readings from Last 3 Encounters:   05/12/20 97.7 kg (215 lb 6.4 oz)   03/16/20 95.3 kg (210 lb)   02/18/20 95.3 kg (210 lb)          Reviewed and updated as needed this visit by Provider  Tobacco  Allergies  Meds  Problems  Med Hx  Surg Hx  Fam Hx         Review of Systems   ROS: 5 point ROS negative except as noted above in HPI, including Gen., Resp., CV, GI &  system review.       Objective    /60 (BP Location: Left arm, Patient Position: Sitting, Cuff Size: Adult Large)   Pulse 72   Temp 97.3  F (36.3  C) (Tympanic)   Resp 18   Ht 1.78 m (5' 10.08\")   Wt 97.7 kg (215 lb 6.4 oz)   SpO2 93%   BMI 30.84 kg/m    Body mass index is 30.84 kg/m .  Physical Exam   GENERAL: healthy, alert and no distress  EYES: Eyes grossly normal to inspection, PERRL and " "conjunctivae and sclerae normal  NECK: no adenopathy, no asymmetry, masses, or scars and thyroid normal to palpation  RESP: lungs clear to auscultation - no rales, rhonchi or wheezes  CV: regular rate and rhythm, normal S1 S2, no S3 or S4, no murmur, click or rub, no peripheral edema and peripheral pulses strong  ABDOMEN: soft, nontender, no hepatosplenomegaly, no masses and bowel sounds normal  MS: no gross musculoskeletal defects noted, no edema    Diagnostic Test Results:  Labs reviewed in Epic        Assessment & Plan     Antoine was seen today for diabetes.    Diagnoses and all orders for this visit:    Type 2 diabetes mellitus with diabetic polyneuropathy, without long-term current use of insulin (H)  -     Hemoglobin A1c  -     insulin isophane human (NOVOLIN N FLEXPEN RELION) 100 UNIT/ML injection; Take 34 units of N in the am and 32 units in the pm.    Spinal stenosis of lumbar region, unspecified whether neurogenic claudication present    Chronic atrial fibrillation    Chronic low back pain with sciatica, sciatica laterality unspecified, unspecified back pain laterality    Hypertension, benign essential, goal below 140/90    Hyperlipidemia LDL goal <100    Peripheral polyneuropathy         BMI:   Estimated body mass index is 30.84 kg/m  as calculated from the following:    Height as of this encounter: 1.78 m (5' 10.08\").    Weight as of this encounter: 97.7 kg (215 lb 6.4 oz).   Weight management plan: Discussed healthy diet and exercise guidelines      Return in about 3 months (around 8/12/2020).    Katie Gross MD  [facility]    "

## 2020-05-22 ENCOUNTER — ANTICOAGULATION THERAPY VISIT (OUTPATIENT)
Dept: ANTICOAGULATION | Facility: CLINIC | Age: 75
End: 2020-05-22
Payer: COMMERCIAL

## 2020-05-22 DIAGNOSIS — I48.20 CHRONIC ATRIAL FIBRILLATION (H): ICD-10-CM

## 2020-05-22 DIAGNOSIS — Z79.01 LONG TERM CURRENT USE OF ANTICOAGULANT THERAPY: ICD-10-CM

## 2020-05-22 LAB
CAPILLARY BLOOD COLLECTION: NORMAL
INR PPP: 4.1 (ref 0.86–1.14)

## 2020-05-22 PROCEDURE — 99207 ZZC NO CHARGE NURSE ONLY: CPT

## 2020-05-22 PROCEDURE — 85610 PROTHROMBIN TIME: CPT | Performed by: FAMILY MEDICINE

## 2020-05-22 PROCEDURE — 36416 COLLJ CAPILLARY BLOOD SPEC: CPT | Performed by: FAMILY MEDICINE

## 2020-05-22 NOTE — PROGRESS NOTES
ANTICOAGULATION FOLLOW-UP CLINIC VISIT    Patient Name:  Antoine Russo  Date:  2020  Contact Type:  Telephone  [employer] message sent to patient (per his request)    SUBJECTIVE:  Patient Findings     Comments:   No acute changes in brooke, medications, diet (vitamin K intake), or activity noted. Took warfarin as instructed, denies any missed doses. No issues with bleeding or unusual bruising noted.     Patient denies any identifiable changes that caused the supratherapeutic INR. No acute inflammation or infection, no changes to alcohol intake.     Last 2 INRs have been therapeutic, will plan to hold x1 and resume prior maintenance dose. If the INR remains elevated, ACC to consider decreasing maintenance dose.         Clinical Outcomes     Negatives:   Major bleeding event, Thromboembolic event, Anticoagulation-related hospital admission, Anticoagulation-related ED visit, Anticoagulation-related fatality    Comments:   No acute changes in brooke, medications, diet (vitamin K intake), or activity noted. Took warfarin as instructed, denies any missed doses. No issues with bleeding or unusual bruising noted.     Patient denies any identifiable changes that caused the supratherapeutic INR. No acute inflammation or infection, no changes to alcohol intake.     Last 2 INRs have been therapeutic, will plan to hold x1 and resume prior maintenance dose. If the INR remains elevated, ACC to consider decreasing maintenance dose.            OBJECTIVE    Recent labs: (last 7 days)     20  1122   INR 4.10*       ASSESSMENT / PLAN  No question data found.  Anticoagulation Summary  As of 2020    INR goal:   2.0-3.0   TTR:   45.6 % (11.9 mo)   INR used for dosin.10! (2020)   Warfarin maintenance plan:   1.25 mg (2.5 mg x 0.5) every Mon; 2.5 mg (2.5 mg x 1) all other days   Full warfarin instructions:   : Hold; Otherwise 1.25 mg every Mon; 2.5 mg all other days   Weekly warfarin total:   16.25 mg   Plan  last modified:   Aparna Kunz RN (3/26/2020)   Next INR check:   6/1/2020   Priority:   Maintenance   Target end date:   Indefinite    Indications    Chronic atrial fibrillation [I48.20]  Long term current use of anticoagulant therapy [Z79.01]             Anticoagulation Episode Summary     INR check location:       Preferred lab:       Send INR reminders to:   [facility]    Comments:   * 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 No bandaid. 2-3 light beers daily.       Anticoagulation Care Providers     Provider Role Specialty Phone number    Katie Martino MD Referring [facility] [phone]            See the Encounter Report to view Anticoagulation Flowsheet and Dosing Calendar (Go to Encounters tab in chart review, and find the Anticoagulation Therapy Visit)        Adeline Duke RN [facility]

## 2020-06-01 ENCOUNTER — TELEPHONE (OUTPATIENT)
Dept: ANTICOAGULATION | Facility: CLINIC | Age: 75
End: 2020-06-01

## 2020-06-01 ENCOUNTER — ANTICOAGULATION THERAPY VISIT (OUTPATIENT)
Dept: ANTICOAGULATION | Facility: CLINIC | Age: 75
End: 2020-06-01
Payer: COMMERCIAL

## 2020-06-01 DIAGNOSIS — I48.20 CHRONIC ATRIAL FIBRILLATION (H): ICD-10-CM

## 2020-06-01 DIAGNOSIS — Z79.01 LONG TERM CURRENT USE OF ANTICOAGULANT THERAPY: ICD-10-CM

## 2020-06-01 LAB
CAPILLARY BLOOD COLLECTION: NORMAL
INR PPP: 2 (ref 0.86–1.14)

## 2020-06-01 PROCEDURE — 36416 COLLJ CAPILLARY BLOOD SPEC: CPT | Performed by: FAMILY MEDICINE

## 2020-06-01 PROCEDURE — 85610 PROTHROMBIN TIME: CPT | Performed by: FAMILY MEDICINE

## 2020-06-01 PROCEDURE — 99207 ZZC NO CHARGE NURSE ONLY: CPT

## 2020-06-01 NOTE — TELEPHONE ENCOUNTER
"Poli is scheduled to have \"discectomy\" completed some time around June 19th at [place] and will need to hold warfarin for 5 days. States the exact date is not scheduled.   Patient is currently on warfarin for Chronic Afib (CHADSVASc of 4 for hypertension, peripheral vascular disease, type 2 diabetes, age 74) .   Of note, patient has not used Lovenox as outpatient.  Perioperative Thrombotic Risk Stratification   High risk for clot    (Bridge) Medium risk for clot   (Maybe Bridge) Low risk for clot   (No Bridge)     Artificial Mitral valve    Artificial aortic valve if tilting disc or caged ball    Stroke, TIA within last 6 months    VTE within last 3 months    Severe thrombophilia (protein C or S deficiency, antithrombin, homozygous Factor V Leiden, antiphospholipid antibodies)  AFIB and    [id number] score 7-9    Stroke/TIA within last 3 months    Rheumatic valvular heart disease   Bileaflet aortic valve  Plus  AFib, prior stroke or TIA, HTN, DM, CHF, or over 75 years old    [id number] score 5-6    VTE within past 3 to 12 months    Non-severe thrombophilia (heterozygous factor V Leiden)    Recurrent VTE    Active cancer (or treated within last 6 months)     Bileaflet valve without Afib, no other risk factors    [id number] < 4 (with no history stroke or TIA)    VTE more than 12 months ago     Is patient okay to hold for 5 days without bridging? Does patient need to schedule an appointment to be seen pre-op?  "

## 2020-06-01 NOTE — TELEPHONE ENCOUNTER
Ok to hold warfarin 5 days without bridging    I don't know about the preop. It is at [facility], sometimes they do their own preop . He should find out

## 2020-06-01 NOTE — TELEPHONE ENCOUNTER
[employer] message sent to patient with the update. Also asked if he is going to have his pre-op at [facility].    Jaxson FARRIS RN, CACP 6/1/2020 at 12:55 PM

## 2020-06-01 NOTE — PROGRESS NOTES
ANTICOAGULATION FOLLOW-UP CLINIC VISIT    Patient Name:  Antoine Russo  Date:  2020  Contact Type:  Telephone    SUBJECTIVE:  Patient Findings     Comments:   Patient took warfarin as prescribed, denies any changes. Patient reported that he is tentatively going to be having back surgery (discectomy) at [place] around . Writer sent a telephone encounter to patient's PCP. He is okay to hold without bridging. Writer will send a [employer] message to patient per his request with this information.     Patient should have his INR rechecked in ~ 2 weeks. If he is going to start holding that week he may not need an INR prior to holding. He is aware if this procedure is not scheduled he needs to call and make an INR appointment.         Clinical Outcomes     Negatives:   Major bleeding event, Thromboembolic event, Anticoagulation-related hospital admission, Anticoagulation-related ED visit, Anticoagulation-related fatality    Comments:   Patient took warfarin as prescribed, denies any changes. Patient reported that he is tentatively going to be having back surgery (discectomy) at [place] around . Writer sent a telephone encounter to patient's PCP. He is okay to hold without bridging. Writer will send a [employer] message to patient per his request with this information.     Patient should have his INR rechecked in ~ 2 weeks. If he is going to start holding that week he may not need an INR prior to holding. He is aware if this procedure is not scheduled he needs to call and make an INR appointment.            OBJECTIVE    Recent labs: (last 7 days)     20  1006   INR 2.00*       ASSESSMENT / PLAN  INR assessment THER    Recheck INR In: 2 WEEKS    INR Location Clinic      Anticoagulation Summary  As of 2020    INR goal:   2.0-3.0   TTR:   45.2 % (1 y)   INR used for dosin.00 (2020)   Warfarin maintenance plan:   1.25 mg (2.5 mg x 0.5) every Mon; 2.5 mg (2.5 mg x 1) all other days   Full  warfarin instructions:   1.25 mg every Mon; 2.5 mg all other days   Weekly warfarin total:   16.25 mg   No change documented:   Adeline Duke RN   Plan last modified:   Aparna Kunz RN (3/26/2020)   Next INR check:   6/15/2020   Priority:   Maintenance   Target end date:   Indefinite    Indications    Chronic atrial fibrillation [I48.20]  Long term current use of anticoagulant therapy [Z79.01]             Anticoagulation Episode Summary     INR check location:       Preferred lab:       Send INR reminders to:   [facility]    Comments:   * 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 No bandaid. 2-3 light beers daily.       Anticoagulation Care Providers     Provider Role Specialty Phone number    Katie Martino MD Referring [facility] [phone]            See the Encounter Report to view Anticoagulation Flowsheet and Dosing Calendar (Go to Encounters tab in chart review, and find the Anticoagulation Therapy Visit)        Adeline Duke RN [facility]

## 2020-06-07 DIAGNOSIS — I10 HYPERTENSION, BENIGN ESSENTIAL, GOAL BELOW 140/90: ICD-10-CM

## 2020-06-07 DIAGNOSIS — I48.20 CHRONIC ATRIAL FIBRILLATION (H): ICD-10-CM

## 2020-06-08 RX ORDER — WARFARIN SODIUM 2.5 MG/1
TABLET ORAL
Qty: 85 TABLET | Refills: 0 | Status: ON HOLD | OUTPATIENT
Start: 2020-06-08 | End: 2020-08-07

## 2020-06-08 RX ORDER — METOPROLOL SUCCINATE 50 MG/1
TABLET, EXTENDED RELEASE ORAL
Qty: 270 TABLET | Refills: 0 | Status: SHIPPED | OUTPATIENT
Start: 2020-06-08 | End: 2020-09-11

## 2020-06-14 ENCOUNTER — APPOINTMENT (OUTPATIENT)
Dept: CT IMAGING | Facility: CLINIC | Age: 75
End: 2020-06-14
Attending: EMERGENCY MEDICINE
Payer: COMMERCIAL

## 2020-06-14 ENCOUNTER — HOSPITAL ENCOUNTER (EMERGENCY)
Facility: CLINIC | Age: 75
Discharge: HOME | End: 2020-06-15
Attending: EMERGENCY MEDICINE | Admitting: EMERGENCY MEDICINE
Payer: COMMERCIAL

## 2020-06-14 DIAGNOSIS — K85.20 ALCOHOL-INDUCED ACUTE PANCREATITIS WITHOUT INFECTION OR NECROSIS: ICD-10-CM

## 2020-06-14 LAB
ALBUMIN SERPL-MCNC: 3.4 G/DL (ref 3.4–5)
ALP SERPL-CCNC: 60 U/L (ref 40–150)
ALT SERPL W P-5'-P-CCNC: 22 U/L (ref 0–70)
ANION GAP SERPL CALCULATED.3IONS-SCNC: 9 MMOL/L (ref 3–14)
ANISOCYTOSIS BLD QL SMEAR: ABNORMAL
AST SERPL W P-5'-P-CCNC: 16 U/L (ref 0–45)
BASOPHILS # BLD AUTO: 0 10E9/L (ref 0–0.2)
BASOPHILS NFR BLD AUTO: 0 %
BILIRUB SERPL-MCNC: 0.7 MG/DL (ref 0.2–1.3)
BUN SERPL-MCNC: 17 MG/DL (ref 7–30)
CALCIUM SERPL-MCNC: 10.4 MG/DL (ref 8.5–10.1)
CHLORIDE SERPL-SCNC: 102 MMOL/L (ref 94–109)
CO2 SERPL-SCNC: 28 MMOL/L (ref 20–32)
CREAT SERPL-MCNC: 0.96 MG/DL (ref 0.66–1.25)
DIFFERENTIAL METHOD BLD: ABNORMAL
EOSINOPHIL # BLD AUTO: 0.3 10E9/L (ref 0–0.7)
EOSINOPHIL NFR BLD AUTO: 2 %
ERYTHROCYTE [DISTWIDTH] IN BLOOD BY AUTOMATED COUNT: 17.4 % (ref 10–15)
GFR SERPL CREATININE-BSD FRML MDRD: 78 ML/MIN/{1.73_M2}
GLUCOSE SERPL-MCNC: 136 MG/DL (ref 70–99)
HCT VFR BLD AUTO: 48 % (ref 40–53)
HGB BLD-MCNC: 15.5 G/DL (ref 13.3–17.7)
INR PPP: 3 (ref 0.86–1.14)
LIPASE SERPL-CCNC: 954 U/L (ref 73–393)
LYMPHOCYTES # BLD AUTO: 2.5 10E9/L (ref 0.8–5.3)
LYMPHOCYTES NFR BLD AUTO: 16 %
MCH RBC QN AUTO: 30.2 PG (ref 26.5–33)
MCHC RBC AUTO-ENTMCNC: 32.3 G/DL (ref 31.5–36.5)
MCV RBC AUTO: 93 FL (ref 78–100)
METAMYELOCYTES # BLD: 0.2 10E9/L
METAMYELOCYTES NFR BLD MANUAL: 1 %
MONOCYTES # BLD AUTO: 1.1 10E9/L (ref 0–1.3)
MONOCYTES NFR BLD AUTO: 7 %
MYELOCYTES # BLD: 0.2 10E9/L
MYELOCYTES NFR BLD MANUAL: 1 %
NEUTROPHILS # BLD AUTO: 11.5 10E9/L (ref 1.6–8.3)
NEUTROPHILS NFR BLD AUTO: 73 %
PLATELET # BLD AUTO: 271 10E9/L (ref 150–450)
PLATELET # BLD EST: ABNORMAL 10*3/UL
POLYCHROMASIA BLD QL SMEAR: SLIGHT
POTASSIUM SERPL-SCNC: 4.2 MMOL/L (ref 3.4–5.3)
PROT SERPL-MCNC: 8.6 G/DL (ref 6.8–8.8)
RBC # BLD AUTO: 5.14 10E12/L (ref 4.4–5.9)
SODIUM SERPL-SCNC: 139 MMOL/L (ref 133–144)
VARIANT LYMPHS BLD QL SMEAR: PRESENT
WBC # BLD AUTO: 15.7 10E9/L (ref 4–11)

## 2020-06-14 PROCEDURE — 85610 PROTHROMBIN TIME: CPT | Performed by: EMERGENCY MEDICINE

## 2020-06-14 PROCEDURE — 83690 ASSAY OF LIPASE: CPT | Performed by: EMERGENCY MEDICINE

## 2020-06-14 PROCEDURE — 85025 COMPLETE CBC W/AUTO DIFF WBC: CPT | Performed by: EMERGENCY MEDICINE

## 2020-06-14 PROCEDURE — 99285 EMERGENCY DEPT VISIT HI MDM: CPT | Mod: Z6 | Performed by: EMERGENCY MEDICINE

## 2020-06-14 PROCEDURE — 96361 HYDRATE IV INFUSION ADD-ON: CPT | Performed by: EMERGENCY MEDICINE

## 2020-06-14 PROCEDURE — 74177 CT ABD & PELVIS W/CONTRAST: CPT

## 2020-06-14 PROCEDURE — 25000125 ZZHC RX 250: Performed by: EMERGENCY MEDICINE

## 2020-06-14 PROCEDURE — 80053 COMPREHEN METABOLIC PANEL: CPT | Performed by: EMERGENCY MEDICINE

## 2020-06-14 PROCEDURE — 99285 EMERGENCY DEPT VISIT HI MDM: CPT | Mod: 25 | Performed by: EMERGENCY MEDICINE

## 2020-06-14 PROCEDURE — 96365 THER/PROPH/DIAG IV INF INIT: CPT | Mod: 59 | Performed by: EMERGENCY MEDICINE

## 2020-06-14 PROCEDURE — 25800030 ZZH RX IP 258 OP 636: Performed by: EMERGENCY MEDICINE

## 2020-06-14 PROCEDURE — 96366 THER/PROPH/DIAG IV INF ADDON: CPT | Performed by: EMERGENCY MEDICINE

## 2020-06-14 PROCEDURE — 96375 TX/PRO/DX INJ NEW DRUG ADDON: CPT | Performed by: EMERGENCY MEDICINE

## 2020-06-14 PROCEDURE — 25000128 H RX IP 250 OP 636: Performed by: EMERGENCY MEDICINE

## 2020-06-14 RX ORDER — HYDROMORPHONE HYDROCHLORIDE 1 MG/ML
0.5 INJECTION, SOLUTION INTRAMUSCULAR; INTRAVENOUS; SUBCUTANEOUS
Status: DISCONTINUED | OUTPATIENT
Start: 2020-06-14 | End: 2020-06-15 | Stop reason: HOSPADM

## 2020-06-14 RX ORDER — ONDANSETRON 2 MG/ML
4 INJECTION INTRAMUSCULAR; INTRAVENOUS ONCE
Status: COMPLETED | OUTPATIENT
Start: 2020-06-14 | End: 2020-06-14

## 2020-06-14 RX ORDER — IOPAMIDOL 755 MG/ML
100 INJECTION, SOLUTION INTRAVASCULAR ONCE
Status: COMPLETED | OUTPATIENT
Start: 2020-06-14 | End: 2020-06-14

## 2020-06-14 RX ADMIN — ONDANSETRON 4 MG: 2 INJECTION INTRAMUSCULAR; INTRAVENOUS at 23:17

## 2020-06-14 RX ADMIN — SODIUM CHLORIDE 65 ML: 9 INJECTION, SOLUTION INTRAVENOUS at 23:33

## 2020-06-14 RX ADMIN — IOPAMIDOL 100 ML: 755 INJECTION, SOLUTION INTRAVENOUS at 23:34

## 2020-06-14 RX ADMIN — SODIUM CHLORIDE, POTASSIUM CHLORIDE, SODIUM LACTATE AND CALCIUM CHLORIDE 1000 ML: 600; 310; 30; 20 INJECTION, SOLUTION INTRAVENOUS at 23:07

## 2020-06-14 RX ADMIN — HYDROMORPHONE HYDROCHLORIDE 0.5 MG: 1 INJECTION, SOLUTION INTRAMUSCULAR; INTRAVENOUS; SUBCUTANEOUS at 23:07

## 2020-06-14 RX ADMIN — FAMOTIDINE 20 MG: 20 INJECTION, SOLUTION INTRAVENOUS at 23:07

## 2020-06-14 ASSESSMENT — MIFFLIN-ST. JEOR: SCORE: 1686.12

## 2020-06-14 NOTE — ED AVS SNAPSHOT
[facility] Emergency Department  [address] [facility] [address] [facility] [address]  Phone:  [phone]  Fax:  [phone]                                    Antoine Russo   MRN: [id number]    Department:  [facility] Emergency Department   Date of Visit:  6/14/2020           After Visit Summary Signature Page    I have received my discharge instructions, and my questions have been answered. I have discussed any challenges I see with this plan with the nurse or doctor.    ..........................................................................................................................................  Patient/Patient Representative Signature      ..........................................................................................................................................  Patient Representative Print Name and Relationship to Patient    ..................................................               ................................................  Date                                   Time    ..........................................................................................................................................  Reviewed by Signature/Title    ...................................................              ..............................................  Date                                               Time          22EPIC Rev 08/18

## 2020-06-15 ENCOUNTER — DOCUMENTATION ONLY (OUTPATIENT)
Dept: FAMILY MEDICINE | Facility: CLINIC | Age: 75
End: 2020-06-15

## 2020-06-15 VITALS
WEIGHT: 205 LBS | HEIGHT: 70 IN | OXYGEN SATURATION: 98 % | RESPIRATION RATE: 18 BRPM | SYSTOLIC BLOOD PRESSURE: 185 MMHG | DIASTOLIC BLOOD PRESSURE: 92 MMHG | BODY MASS INDEX: 29.35 KG/M2 | TEMPERATURE: 97.8 F | HEART RATE: 73 BPM

## 2020-06-15 DIAGNOSIS — Z79.01 LONG TERM CURRENT USE OF ANTICOAGULANT THERAPY: ICD-10-CM

## 2020-06-15 DIAGNOSIS — I48.20 CHRONIC ATRIAL FIBRILLATION (H): ICD-10-CM

## 2020-06-15 LAB
ALBUMIN UR-MCNC: 100 MG/DL
APPEARANCE UR: ABNORMAL
BACTERIA #/AREA URNS HPF: ABNORMAL /HPF
BILIRUB UR QL STRIP: NEGATIVE
COLOR UR AUTO: YELLOW
GLUCOSE UR STRIP-MCNC: NEGATIVE MG/DL
HGB UR QL STRIP: ABNORMAL
KETONES UR STRIP-MCNC: NEGATIVE MG/DL
LEUKOCYTE ESTERASE UR QL STRIP: ABNORMAL
MUCOUS THREADS #/AREA URNS LPF: PRESENT /LPF
NITRATE UR QL: POSITIVE
PH UR STRIP: 6 PH (ref 5–7)
RBC #/AREA URNS AUTO: 20 /HPF (ref 0–2)
RENAL EPI CELLS #/AREA URNS HPF: 3 /HPF
SOURCE: ABNORMAL
SP GR UR STRIP: 1.03 (ref 1–1.03)
UROBILINOGEN UR STRIP-MCNC: 0 MG/DL (ref 0–2)
WBC #/AREA URNS AUTO: >182 /HPF (ref 0–5)
WBC CLUMPS #/AREA URNS HPF: PRESENT /HPF
YEAST #/AREA URNS HPF: ABNORMAL /HPF

## 2020-06-15 PROCEDURE — 87088 URINE BACTERIA CULTURE: CPT | Performed by: EMERGENCY MEDICINE

## 2020-06-15 PROCEDURE — 25800030 ZZH RX IP 258 OP 636: Performed by: EMERGENCY MEDICINE

## 2020-06-15 PROCEDURE — 81001 URINALYSIS AUTO W/SCOPE: CPT | Performed by: EMERGENCY MEDICINE

## 2020-06-15 PROCEDURE — 87186 SC STD MICRODIL/AGAR DIL: CPT | Performed by: EMERGENCY MEDICINE

## 2020-06-15 PROCEDURE — 87086 URINE CULTURE/COLONY COUNT: CPT | Performed by: EMERGENCY MEDICINE

## 2020-06-15 RX ORDER — OXYCODONE HYDROCHLORIDE 5 MG/1
5 TABLET ORAL EVERY 6 HOURS PRN
Qty: 2 TABLET | Refills: 0 | Status: SHIPPED | OUTPATIENT
Start: 2020-06-15 | End: 2020-08-10

## 2020-06-15 RX ORDER — ONDANSETRON 4 MG/1
4 TABLET, ORALLY DISINTEGRATING ORAL EVERY 8 HOURS PRN
Qty: 10 TABLET | Refills: 0 | Status: ON HOLD | OUTPATIENT
Start: 2020-06-15 | End: 2020-10-05

## 2020-06-15 RX ORDER — ONDANSETRON 4 MG/1
4 TABLET, ORALLY DISINTEGRATING ORAL EVERY 8 HOURS PRN
Qty: 2 TABLET | Refills: 0 | Status: ON HOLD | OUTPATIENT
Start: 2020-06-15 | End: 2020-08-04

## 2020-06-15 RX ORDER — OXYCODONE HYDROCHLORIDE 5 MG/1
5 TABLET ORAL EVERY 6 HOURS PRN
Status: DISCONTINUED | OUTPATIENT
Start: 2020-06-15 | End: 2020-06-15 | Stop reason: HOSPADM

## 2020-06-15 RX ORDER — OXYCODONE HYDROCHLORIDE 5 MG/1
5 TABLET ORAL EVERY 6 HOURS PRN
Qty: 5 TABLET | Refills: 0 | Status: ON HOLD | OUTPATIENT
Start: 2020-06-15 | End: 2020-08-04

## 2020-06-15 RX ORDER — ONDANSETRON 4 MG/1
4 TABLET, ORALLY DISINTEGRATING ORAL EVERY 8 HOURS PRN
Status: DISCONTINUED | OUTPATIENT
Start: 2020-06-15 | End: 2020-06-15 | Stop reason: HOSPADM

## 2020-06-15 RX ADMIN — SODIUM CHLORIDE, POTASSIUM CHLORIDE, SODIUM LACTATE AND CALCIUM CHLORIDE 1000 ML: 600; 310; 30; 20 INJECTION, SOLUTION INTRAVENOUS at 01:10

## 2020-06-15 NOTE — PROGRESS NOTES
ANTICOAGULATION  MANAGEMENT: Discharge Review    Antoine Russo chart reviewed for anticoagulation continuity of care    Emergency room visit on 6/14/20 for Abdominal Pain.    Discharge disposition: Home    Results:    Recent labs: (last 7 days)     06/14/20  2243   INR 3.00*     Anticoagulation inpatient management:     not applicable     Anticoagulation discharge instructions:     Warfarin dosing: home regimen continued   Bridging: No   INR goal change: No      Medication changes affecting anticoagulation: Yes: Zofran/Oxycodone    Additional factors affecting anticoagulation: Yes: Pancreatitis      Plan     No adjustment to anticoagulation plan needed    My chart message.    Anticoagulation Calendar updated    Angela Harrell RN

## 2020-06-15 NOTE — ED PROVIDER NOTES
History     Chief Complaint   Patient presents with     Abdominal Pain     HPI  Antoine Russo is a 74 year old male with a history of atrial fibrillation on warfarin and a history of chronic pancreatitis related to alcohol abuse who presents for epigastric abdominal pain that he says is typical of his pancreatitis.  Symptoms getting worse since yesterday.  Pain is located in the epigastric and left upper quadrant region, sharp, rated as severe, nonradiating.  He has nausea but no vomiting.  He denies any diarrhea, fever, chills, chest pain, difficulty breathing, cough, dysuria, hematuria, or rash.    Allergies:  Allergies   Allergen Reactions     Nkda [No Known Drug Allergies]        Problem List:    Patient Active Problem List    Diagnosis Date Noted     Anticipated difficulty with intubation 05/23/2017     Priority: High     Class: Chronic     Difficult two hands mask ventilation, intubated multiple times asleep with video laryngoscope. H/o tongue cancer surgery.        Peripheral polyneuropathy 05/12/2020     Priority: Medium     Chronic low back pain with sciatica, sciatica laterality unspecified, unspecified back pain laterality 05/12/2020     Priority: Medium     Alcohol dependence, uncomplicated (H) 02/04/2020     Priority: Medium     Acute right-sided low back pain with left-sided sciatica 02/04/2020     Priority: Medium     Spinal stenosis of lumbar region, unspecified whether neurogenic claudication present 02/04/2020     Priority: Medium     Pancreatic pseudocyst 04/18/2019     Priority: Medium     Acute pancreatitis 10/21/2018     Priority: Medium     Long term current use of anticoagulant therapy 04/05/2018     Priority: Medium     Recurrent pancreatitis 03/30/2018     Priority: Medium     Chronic atrial fibrillation (H) 01/23/2018     Priority: Medium     Spleen absent 10/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 Please refer if Care Coordination services are needed.    Presenting Problem Signs and Symptoms Treatment Plan   Questions or concerns   during clinic hours   I will call my clinic directly:  [address], [place] [address]  [phone].   Questions or concerns outside clinic hours   I will call the 24 hour nurse line at   [phone] or [phone].   Need to schedule an appointment   I will call the 24 hour scheduling team at [phone] or my clinic directly at [phone].   Same day treatment     I will call my clinic first, nurse line if after hours, urgent care and express care if needed.   Clinic care coordination services (regular clinic hours)   I will call a clinic care coordinator directly:     Shelia Emanuel RN [facility]  [phone]    JAY Estes:    [phone]    Or call my clinic at [phone] and ask to speak with care coordination.   Crisis Services: Behavioral or Mental Health  [facility] 24 Hour Phone Line  [phone]    [facility] 24 Hour Crisis Services  [phone]    [facility] (Behavioral Health Providers) Network [phone]    [facility]   [phone]     Emergency treatment -- Immediately    [facility] 911                Clostridium difficile enterocolitis 12/18/2012     Priority: Medium     Groin fluid collection 12/15/2012     Priority: Medium     CT 12/12- New (since 5/11) collection measuring at least 2.3 cm in diameter and 6.5 cm in length within the right inguinal canal. 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 Gerson Ravi and collegues at the  in 2012  NAD 2017  CT scan of the neck every 2-3 years.  - Thyroid labs yearly.  - Carotid ultrasound in three to four years to evaluate for stenosis.       Hyperlipidemia LDL goal <100 10/31/2010     Priority: Medium     CAD (coronary artery disease) 05/26/2009     Priority: Medium     Stress testing 2009 showed inferior wall ischemia.  Cath preformed; identified moderate CAD with stenosis of 40-50% in LAD and RCA.  No stents were placed.  Echo in 01/2018 (done while in Afib with rates of 100-110); EF of 55-60%.  No RWMA.       GERD (gastroesophageal reflux disease) 05/26/2009     Priority: Medium     Hypertrophy of breast 09/25/2007     Priority: Medium     PERS HX TOBACCO USE - quit in 11/06 with chantix 03/15/2007     Priority: Medium     Hypertension, benign essential, goal below 140/90 11/07/2005     Priority: Medium     Patient has only fair bp control and with family history of diabetes will all ace if lab indicated also has bph and may op for psa and not digital exam next yr        Pain in joint, shoulder region 11/07/2005     Priority: Medium     Hypertrophy of prostate without urinary obstruction 11/07/2005     Priority: Medium     Problem list name updated by automated process. 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  difficile colitis      Colon polyp      Colon polyp 8/26/2011     Coronary artery disease      Diabetes mellitus (H)      Diverticulitis      Diverticulitis of colon 7/17/2007     Hypertension      Leukocytosis 4/17/2019     Malignant neoplasm (H)      Noninfectious ileitis      Recurrent pancreatitis        Past Surgical History:    Past Surgical History:   Procedure Laterality Date     BREAST SURGERY  2008    right breast mass benign     COLONOSCOPY      multiple polyps removed     COLONOSCOPY  8/24/2011    Procedure:COMBINED COLONOSCOPY, REMOVE TUMOR/POLYP/LESION BY SNARE; Surgeon:MILEY ARBOLEDA; Location:[facility]     COLONOSCOPY  12/17/2012    Procedure: COLONOSCOPY;;  Surgeon: Leon Maurer MD;  [facility]     COLONOSCOPY  12/18/2012    Procedure: COLONOSCOPY;;  Surgeon: Leon Maurer MD;  [facility]     DENERVATION OF SPERMATIC CORD MICROSURGICAL Left 5/23/2017    Procedure: DENERVATION OF SPERMATIC CORD MICROSURGICAL;;  Surgeon: Marcio Aggarwal MD;  [facility]     DISSECTION RADICAL NECK BILATERAL  8/2/2012    Procedure: DISSECTION RADICAL NECK BILATERAL;;  Surgeon: Yung Alvares MD;  [facility]     ENDOSCOPIC RETROGRADE CHOLANGIOPANCREATOGRAM N/A 5/10/2016    Procedure: COMBINED ENDOSCOPIC RETROGRADE CHOLANGIOPANCREATOGRAPHY, PLACE TUBE/STENT;  Surgeon: Yovanny Beasley MD;  [facility]     ENDOSCOPIC RETROGRADE CHOLANGIOPANCREATOGRAM N/A 3/29/2018    Procedure: ENDOSCOPIC RETROGRADE CHOLANGIOPANCREATOGRAM;  Endoscopic Retrograde Cholangiopancreatogram, Endoscopic Ultrasound, Biliary Sphincterotomy, Biliary and Pancreatic Stent Placement;  Surgeon: Yovanny Beasley MD;  [facility]     ENDOSCOPIC ULTRASOUND UPPER GASTROINTESTINAL TRACT (GI) N/A 2/3/2016    Procedure: ENDOSCOPIC ULTRASOUND, ESOPHAGOSCOPY / UPPER GASTROINTESTINAL TRACT (GI);  Surgeon: Grabiel Plata MD;  [facility]     ENDOSCOPIC ULTRASOUND UPPER GASTROINTESTINAL  TRACT (GI) N/A 3/29/2018    Procedure: ENDOSCOPIC ULTRASOUND, ESOPHAGOSCOPY / UPPER GASTROINTESTINAL TRACT (GI);;  Surgeon: Grabiel Plata MD;  [facility]     ESOPHAGOSCOPY, GASTROSCOPY, DUODENOSCOPY (EGD), COMBINED N/A 2/3/2016    Procedure: COMBINED ENDOSCOPIC ULTRASOUND, ESOPHAGOSCOPY, GASTROSCOPY, DUODENOSCOPY (EGD), FINE NEEDLE ASPIRATE/BIOPSY;  Surgeon: Grabiel Plata MD;  [facility]     ESOPHAGOSCOPY, GASTROSCOPY, DUODENOSCOPY (EGD), COMBINED N/A 6/8/2016    Procedure: COMBINED ESOPHAGOSCOPY, GASTROSCOPY, DUODENOSCOPY (EGD), REMOVE FOREIGN BODY;  Surgeon: Yovanny Beasley MD;  [facility]     ESOPHAGOSCOPY, GASTROSCOPY, DUODENOSCOPY (EGD), COMBINED N/A 4/17/2018    Procedure: COMBINED ESOPHAGOSCOPY, GASTROSCOPY, DUODENOSCOPY (EGD), REMOVE FOREIGN BODY;  EGD with stent removal;  Surgeon: Grabiel Plata MD;  [facility]     EXCISE LESION INTRAORAL  6/14/2012    Procedure: EXCISE LESION INTRAORAL;  Wide Local Excision Floor of Mouth, Direct Laryngoscopy, Bilateral Ida's Marsuplization, Split Thickness Skin Graft from right Thigh  Latex Safe;  Surgeon: Gerson Ravi MD;  [facility]     EXCISE LESION INTRAORAL  8/2/2012    Procedure: EXCISE LESION INTRAORAL;  Floor of Mouth Resection, Bilateral Selective Radical Neck Dissection, Tracheostomy, Left Radial Forearm  Free Flap with Alloderm, Nasogastric Feeding Tube Placement,    * Latex Safe*;  Surgeon: Gerson Ravi MD;  [facility]     EXCISE LESION INTRAORAL  12/11/2012    Procedure: EXCISE LESION INTRAORAL;  takedown of oral flap;  Surgeon: Yung Alvares MD;  [facility]     GRAFT FREE VASCULARIZED (LOCATION)  8/2/2012    Procedure: GRAFT FREE VASCULARIZED (LOCATION);;  Surgeon: Yung Alvares MD;  [facility]     GRAFT SKIN SPLIT THICKNESS FROM EXTREMITY  6/14/2012    Procedure: GRAFT SKIN SPLIT THICKNESS FROM EXTREMITY;;  Surgeon: Gerson Ravi MD;  [facility]     LAPAROSCOPIC  "ILEOSTOMY TAKEDOWN  2013    Procedure: LAPAROSCOPIC ILEOSTOMY TAKEDOWN;  Laparoscopic Closure of Enterostomy, Guerda's Type with IleoRectal Anastomosis ;  Surgeon: Grabiel Riddle MD;  [facility]     LAPAROTOMY EXPLORATORY  2012    Procedure: LAPAROTOMY EXPLORATORY;  Exploratory Laparotomy, total abdominal colectomy, ileostomy formation;  Surgeon: Miquel Cannon MD;  [facility]     LARYNGOSCOPY  2012    Procedure: LARYNGOSCOPY;;  Surgeon: Gerson Ravi MD;  [facility]     ORTHOPEDIC SURGERY      ganglian cyst left ankle     PANCREATECTOMY, SPLENECTOMY N/A 3/10/2016    Procedure: PANCREATECTOMY, SPLENECTOMY;  Surgeon: Nael Abel MD;  [facility]     SHOULDER SURGERY  ,     2006- right rotator cuff,  bone spur on left. Dr. Hdez     VARICOCELECTOMY Left 2017    Procedure: VARICOCELECTOMY;  Left Varicocele Repair, Denervation of Left Testis;  Surgeon: Marcio Aggarwal MD;  [facility]       Family History:    Family History   Problem Relation Age of Onset     Diabetes Sister         onset age 50     Alzheimer Disease Mother          80     Alzheimer Disease Father          85     Diabetes Other         nephew type 1     Diabetes Other      Aneurysm Sister      Anesthesia Reaction No family hx of      Colon Cancer No family hx of      Colon Polyps No family hx of      Crohn's Disease No family hx of      Ulcerative Colitis No family hx of        Social History:  Marital Status:   [2]  Social History     Tobacco Use     Smoking status: Former Smoker     Packs/day: 1.00     Years: 40.00     Pack years: 40.00     Types: Cigarettes     Last attempt to quit: 2006     Years since quittin.5     Smokeless tobacco: Never Used   Substance Use Topics     Alcohol use: Not Currently     Comment: \"1 beer on Thanksgiving day\"     Drug use: Yes     Comment: 3 beers daily        Medications:    ondansetron (ZOFRAN ODT) 4 MG ODT " "tab  ondansetron (ZOFRAN ODT) 4 MG ODT tab  oxyCODONE (ROXICODONE) 5 MG tablet  oxyCODONE (ROXICODONE) 5 MG tablet  ASPIRIN NOT PRESCRIBED (INTENTIONAL)  blood glucose (CONTOUR NEXT TEST) test strip  Continuous Blood Gluc  (DEXCOM G6 ) JOSE ALBERTO  Continuous Blood Gluc  (FREESTYLE LISA 14 DAY READER) JOSE ALBERTO  Continuous Blood Gluc Sensor (DEXCOM G6 SENSOR) MISC  Continuous Blood Gluc Sensor (FREESTYLE LISA 14 DAY SENSOR) MISC  Continuous Blood Gluc Transmit (DEXCOM G6 TRANSMITTER) MISC  insulin isophane human (NOVOLIN N FLEXPEN RELION) 100 UNIT/ML injection  insulin pen needle (BD LUIS U/F) 32G X 4 MM miscellaneous  lisinopril (PRINIVIL/ZESTRIL) 5 MG tablet  metoprolol succinate ER (TOPROL-XL) 50 MG 24 hr tablet  multivitamin, therapeutic with minerals (THERA-VIT-M) TABS  rosuvastatin (CRESTOR) 10 MG tablet  tamsulosin (FLOMAX) 0.4 MG capsule  vitamin B-12 (CYANOCOBALAMIN) 100 MCG tablet  warfarin ANTICOAGULANT (COUMADIN) 2.5 MG tablet          Review of Systems  Pertinent positives and negatives listed in the HPI, all other systems reviewed and are negative.    Physical Exam   BP: (!) 189/93  Pulse: 111  Temp: 97.8  F (36.6  C)  Resp: 18  Height: 177.8 cm (5' 10\")  Weight: 93 kg (205 lb)  SpO2: 95 %      Physical Exam  Vitals signs and nursing note reviewed.   Constitutional:       General: He is in acute distress.      Appearance: He is well-developed. He is not diaphoretic.   HENT:      Head: Normocephalic and atraumatic.      Right Ear: External ear normal.      Left Ear: External ear normal.      Nose: Nose normal.   Eyes:      General: No scleral icterus.     Conjunctiva/sclera: Conjunctivae normal.   Neck:      Musculoskeletal: Normal range of motion.   Cardiovascular:      Rate and Rhythm: Normal rate and regular rhythm.   Pulmonary:      Effort: Pulmonary effort is normal. No respiratory distress.      Breath sounds: No stridor.   Abdominal:      General: There is no distension.      " Palpations: Abdomen is soft.      Tenderness: There is abdominal tenderness in the epigastric area and left upper quadrant. There is no guarding or rebound.      Comments: Large well-healed abdominal surgical scars   Skin:     General: Skin is warm and dry.   Neurological:      Mental Status: He is alert and oriented to person, place, and time.   Psychiatric:         Behavior: Behavior normal.         ED Course        Procedures               Critical Care time:  none               Results for orders placed or performed during the hospital encounter of 06/14/20 (from the past 24 hour(s))   INR   Result Value Ref Range    INR 3.00 (H) 0.86 - 1.14   CBC with platelets differential   Result Value Ref Range    WBC 15.7 (H) 4.0 - 11.0 10e9/L    RBC Count 5.14 4.4 - 5.9 10e12/L    Hemoglobin 15.5 13.3 - 17.7 g/dL    Hematocrit 48.0 40.0 - 53.0 %    MCV 93 78 - 100 fl    MCH 30.2 26.5 - 33.0 pg    MCHC 32.3 31.5 - 36.5 g/dL    RDW 17.4 (H) 10.0 - 15.0 %    Platelet Count 271 150 - 450 10e9/L    Diff Method Manual Differential     % Neutrophils 73.0 %    % Lymphocytes 16.0 %    % Monocytes 7.0 %    % Eosinophils 2.0 %    % Basophils 0.0 %    % Metamyelocytes 1.0 %    % Myelocytes 1.0 %    Absolute Neutrophil 11.5 (H) 1.6 - 8.3 10e9/L    Absolute Lymphocytes 2.5 0.8 - 5.3 10e9/L    Absolute Monocytes 1.1 0.0 - 1.3 10e9/L    Absolute Eosinophils 0.3 0.0 - 0.7 10e9/L    Absolute Basophils 0.0 0.0 - 0.2 10e9/L    Absolute Metamyelocytes 0.2 (H) 0 10e9/L    Absolute Myelocytes 0.2 (H) 0 10e9/L    Anisocytosis Moderate     Polychromasia Slight     Reactive Lymphs Present     Platelet Estimate       Automated count confirmed.  Giant platelets are present.   Comprehensive metabolic panel   Result Value Ref Range    Sodium 139 133 - 144 mmol/L    Potassium 4.2 3.4 - 5.3 mmol/L    Chloride 102 94 - 109 mmol/L    Carbon Dioxide 28 20 - 32 mmol/L    Anion Gap 9 3 - 14 mmol/L    Glucose 136 (H) 70 - 99 mg/dL    Urea Nitrogen 17 7 - 30  mg/dL    Creatinine 0.96 0.66 - 1.25 mg/dL    GFR Estimate 78 >60 mL/min/[1.73_m2]    GFR Estimate If Black >90 >60 mL/min/[1.73_m2]    Calcium 10.4 (H) 8.5 - 10.1 mg/dL    Bilirubin Total 0.7 0.2 - 1.3 mg/dL    Albumin 3.4 3.4 - 5.0 g/dL    Protein Total 8.6 6.8 - 8.8 g/dL    Alkaline Phosphatase 60 40 - 150 U/L    ALT 22 0 - 70 U/L    AST 16 0 - 45 U/L   Lipase   Result Value Ref Range    Lipase 954 (H) 73 - 393 U/L   CT Abdomen Pelvis w Contrast    Narrative    EXAM: CT ABDOMEN PELVIS W CONTRAST  LOCATION: [facility]  DATE/TIME: 6/14/2020 11:33 PM    INDICATION: Nausea, vomiting  Abd pain, acute, generalized  COMPARISON: 05/22/2019  TECHNIQUE: CT scan of the abdomen and pelvis was performed following injection of IV contrast. Multiplanar reformats were obtained. Dose reduction techniques were used.  CONTRAST: 100 mL Isovue-370    FINDINGS:   LOWER CHEST: Minimal left basilar atelectasis.    HEPATOBILIARY: Liver stable. Slight distention of the gallbladder. No biliary dilatation.    PANCREAS: Mild peripancreatic edema. Distal pancreatectomy.    SPLEEN: Splenectomy.    ADRENAL GLANDS: Normal.    KIDNEYS/BLADDER: Bilateral renal cysts. Subcentimeter renal hypodensities are small for characterization. Renal cortical scarring. Bladder wall thickening.    BOWEL: Gastric wall thickening. Small amount of free fluid adjacent to the stomach. Small bowel is normal caliber. Subtotal colectomy.    LYMPH NODES: Small gastrohepatic, peripancreatic and right upper quadrant lymph nodes.    VASCULATURE: Atherosclerotic vascular calcification.    PELVIC ORGANS: Stable.    MUSCULOSKELETAL: Degenerative change osseous structures.      Impression    IMPRESSION:   1.  Mild peripancreatic edema. Correlate for acute pancreatitis.  2.  Gastric wall thickening is again seen but not as significant as the previous study. Uncertain if this is sequela of pancreatitis versus nonspecific gastritis. Small amount of free fluid  adjacent to the stomach.  3.  No bowel obstruction or abscess.  4.  Bilateral renal cysts.  5.  Bladder wall thickening. Correlate for cystitis.     UA with Microscopic reflex to Culture    Specimen: Midstream Urine   Result Value Ref Range    Color Urine Yellow     Appearance Urine Cloudy     Glucose Urine Negative NEG^Negative mg/dL    Bilirubin Urine Negative NEG^Negative    Ketones Urine Negative NEG^Negative mg/dL    Specific Gravity Urine 1.035 1.003 - 1.035    Blood Urine Small (A) NEG^Negative    pH Urine 6.0 5.0 - 7.0 pH    Protein Albumin Urine 100 (A) NEG^Negative mg/dL    Urobilinogen mg/dL 0.0 0.0 - 2.0 mg/dL    Nitrite Urine Positive (A) NEG^Negative    Leukocyte Esterase Urine Large (A) NEG^Negative    Source Midstream Urine     WBC Urine >182 (H) 0 - 5 /HPF    RBC Urine 20 (H) 0 - 2 /HPF    WBC Clumps Present (A) NEG^Negative /HPF    Bacteria Urine Few (A) NEG^Negative /HPF    Yeast Urine Moderate (A) NEG^Negative /HPF    Renal Tub Epi 3 (A) NEG^Negative /HPF    Mucous Urine Present (A) NEG^Negative /LPF       Medications   famotidine (PEPCID) infusion 20 mg (0 mg Intravenous Stopped 6/15/20 0038)   HYDROmorphone (PF) (DILAUDID) injection 0.5 mg (0.5 mg Intravenous Given 6/14/20 2307)   oxyCODONE (ROXICODONE) tablet 5 mg (has no administration in time range)   ondansetron (ZOFRAN-ODT) ODT tab 4 mg (has no administration in time range)   lactated ringers BOLUS 1,000 mL (0 mLs Intravenous Stopped 6/15/20 0111)   ondansetron (ZOFRAN) injection 4 mg (4 mg Intravenous Given 6/14/20 2317)   lactated ringers BOLUS 1,000 mL (0 mLs Intravenous Stopped 6/15/20 0131)   iopamidol (ISOVUE-370) solution 100 mL (100 mLs Intravenous Given 6/14/20 2334)   sodium chloride 0.9 % bag 500mL for CT scan flush use (65 mLs Intravenous Given 6/14/20 2333)       Assessments & Plan (with Medical Decision Making)   74-year-old male presents with abdominal pain with nausea.  Temperature is 97.8  F, heart rate 111, respiratory  rate 18, SPO2 95% on room air.  She is given IV fluids, IV hydromorphone, famotidine, and ondansetron for symptoms.  Lipase is elevated at 954, consistent with acute pancreatitis.  Electrolytes are within normal limits.  White blood cell count is mildly elevated at 15.7.  Hemoglobin is 15.5, no signs of anemia as a cause of symptoms.  INR is therapeutic at 3.  LFTs are within normal limits, no signs of hepatitis.  CT of the abdomen/pelvis obtained, images reviewed independently as well as radiology read reviewed, positive for pancreatitis and gastritis.  On recheck the patient says he feels much better.  We discussed admission to the hospital versus home management and the patient says he is not sure which he thinks would be better at this time.  Therefore he is given water to drink and is drinking this without difficulty.  He feels comfortable.  And feels comfortable going home with a short course of ondansetron and oxycodone for symptoms.  He is told to return if he has worsening symptoms or other concerns, otherwise follow-up in clinic.  The patient is in agreement to this plan.    I have reviewed the nursing notes.    I have reviewed the findings, diagnosis, plan and need for follow up with the patient.       Discharge Medication List as of 6/15/2020  1:42 AM      START taking these medications    Details   !! ondansetron (ZOFRAN ODT) 4 MG ODT tab Take 1 tablet (4 mg) by mouth every 8 hours as needed for nausea, Disp-2 tablet,R-0, Local Print      !! ondansetron (ZOFRAN ODT) 4 MG ODT tab Take 1 tablet (4 mg) by mouth every 8 hours as needed for nausea, Disp-10 tablet,R-0, E-Prescribe      !! oxyCODONE (ROXICODONE) 5 MG tablet Take 1 tablet (5 mg) by mouth every 6 hours as needed for pain, Disp-2 tablet,R-0, Local Print      !! oxyCODONE (ROXICODONE) 5 MG tablet Take 1 tablet (5 mg) by mouth every 6 hours as needed for pain, Disp-5 tablet,R-0, E-Prescribe       !! - Potential duplicate medications found. Please  discuss with provider.          Final diagnoses:   Alcohol-induced acute pancreatitis without infection or necrosis       6/14/2020   [facility] EMERGENCY DEPARTMENT     Michael Turcios MD  06/15/20 2753

## 2020-06-15 NOTE — ED TRIAGE NOTES
Has history of pancreatitis states hes having a flare up. Abdominal pain on the left upper along with nausea, this started a few days ago and has gotten progressively worse

## 2020-06-15 NOTE — DISCHARGE INSTRUCTIONS
Use the Zofran and oxycodone as needed for symptoms.  Avoid drinking alcohol.  Return to the emergency department for fevers, repeated vomiting, worsening symptoms, or other concerns.  Otherwise follow-up in clinic for recheck later this week.

## 2020-06-17 ENCOUNTER — TELEPHONE (OUTPATIENT)
Dept: EMERGENCY MEDICINE | Facility: CLINIC | Age: 75
End: 2020-06-17

## 2020-06-17 DIAGNOSIS — N39.0 URINARY TRACT INFECTION: ICD-10-CM

## 2020-06-17 LAB
BACTERIA SPEC CULT: ABNORMAL
BACTERIA SPEC CULT: ABNORMAL
Lab: ABNORMAL
SPECIMEN SOURCE: ABNORMAL

## 2020-06-17 RX ORDER — CEPHALEXIN 500 MG/1
500 CAPSULE ORAL 2 TIMES DAILY
Qty: 14 CAPSULE | Refills: 0 | Status: SHIPPED | OUTPATIENT
Start: 2020-06-17 | End: 2020-07-27

## 2020-06-17 NOTE — TELEPHONE ENCOUNTER
[facility]  Emergency Department Lab result notification [Adult-Male]    [facility] lab result protocol used  Urine culture    Reason for call  Notify of lab results, assess symptoms,  review ED providers recommendations/discharge instructions (if necessary) and advise per ED lab result f/u protocol    Lab Result (including Rx patient on, if applicable)  Preliminary urine culture report on 6/17/2020 shows the presence of bacteria(s):  >100,000 colonies/mL Klebsiella pneumoniae and >100,000 colonies/mL Strain 2 Klebsiella pneumoniae   Emergency Dept/Urgent Care discharge antibiotic: None  Recommendations per [facility] Lab result protocol - Urine culture protocol.    Information table from ED Provider visit on 6/14/20-6/15/2020  Symptoms reported at ED visit (Chief complaint, HPI) Abdominal Pain     HPI  Antoine Russo is a 74 year old male with a history of atrial fibrillation on warfarin and a history of chronic pancreatitis related to alcohol abuse who presents for epigastric abdominal pain that he says is typical of his pancreatitis.  Symptoms getting worse since yesterday.  Pain is located in the epigastric and left upper quadrant region, sharp, rated as severe, nonradiating.  He has nausea but no vomiting.  He denies any diarrhea, fever, chills, chest pain, difficulty breathing, cough, dysuria, hematuria, or rash.     Significant Medical hx, if applicable (i.e. CKD, diabetes) Type 2 diabetes   Allergies Allergies   Allergen Reactions     Nkda [No Known Drug Allergies]       Weight, if applicable Wt Readings from Last 2 Encounters:   06/14/20 93 kg (205 lb)   05/12/20 97.7 kg (215 lb 6.4 oz)      Coumadin/Warfarin [Yes /No] Yes   Creatinine Level (mg/dl) Creatinine   Date Value Ref Range Status   06/14/2020 0.96 0.66 - 1.25 mg/dL Final      Creatinine clearance (ml/min), if applicable Serum creatinine: 0.96 mg/dL 06/14/20 2244  Estimated creatinine clearance: 79.7 mL/min   ED providers  "Impression and Plan (applicable information) 74-year-old male presents with abdominal pain with nausea.  Temperature is 97.8  F, heart rate 111, respiratory rate 18, SPO2 95% on room air.  She is given IV fluids, IV hydromorphone, famotidine, and ondansetron for symptoms.  Lipase is elevated at 954, consistent with acute pancreatitis.  Electrolytes are within normal limits.  White blood cell count is mildly elevated at 15.7.  Hemoglobin is 15.5, no signs of anemia as a cause of symptoms.  INR is therapeutic at 3.  LFTs are within normal limits, no signs of hepatitis.  CT of the abdomen/pelvis obtained, images reviewed independently as well as radiology read reviewed, positive for pancreatitis and gastritis.  On recheck the patient says he feels much better.  We discussed admission to the hospital versus home management and the patient says he is not sure which he thinks would be better at this time.  Therefore he is given water to drink and is drinking this without difficulty.  He feels comfortable.  And feels comfortable going home with a short course of ondansetron and oxycodone for symptoms.  He is told to return if he has worsening symptoms or other concerns, otherwise follow-up in clinic.  The patient is in agreement to this plan.      ED diagnosis Alcohol-induced acute pancreatitis without infection or necrosis   ED provider Michael Turcios MD RN Assessment (Patient s current Symptoms), include time called.  [Insert Left message here if message left]  10:20AM: Spoke with patient. He is currently in [place], has surgery in 2 days. Denies any urinary symptoms, no frequency, urgency or pain with urination. Denies any abdominal pain. \"I think I was constipated.\"     RN Recommendations/Instructions per [facility] ED lab result protocol  Patient notified of lab result and treatment recommendations.   Advised that I would speak with his surgeon's office nurse at [place] and see what they would like done. " Patient has an appointment with the surgeon tomorrow.     10:26AM: Left urgent message for Jana Edmonds, Dr. Gillette's nurse, to call this RN back.   10:46AM: Spoke with Jana Edmonds RN for Dr. Gillette. Ok to order Keflex per protocol. She will let the surgeon know. The patient has an appointment with the surgeon tomorrow.     10:50AM: Spoke with patient.   Patient notified of lab result and treatment recommendations.  Rx for Cephalexin (Keflex) 500 mg capsule, 1 capsule (500 mg) by mouth 2 times daily for 7 days sent to [[facility]armacy[facility] in [place] ([address]      RN reviewed information about medication being prescribed based on preliminary findings in the culture and changes may be necessary when culture report finalizes.  If changes are needed, Patient will be contacted.  If no changes are necessary, no call will be placed and Patient has been advised to complete the antibiotic as prescribed today.  Patient verbalizes understanding.    The patient is aware that the surgeon will be updated by Marsha SROENSEN. He was advised to contact them if he has any other questions or concerns.    The patient is comfortable with the information given and has no further questions.      Please Contact your PCP clinic or return to the Emergency department if your:    Symptoms worsen or other concerning symptom's.    PCP follow-up Questions asked: YES       [RN Name]  Sary Fonseca RN  [facility] RN  Lung Nodule and ED Lab Result RN  Epic pool (ED late result f/u RN): [id number]  [facility] INCIDENTAL RADIOLOGY F/U NURSES: [id number]  # [phone]      Copy of Lab result  Urine Culture Aerobic Bacterial   Order: [id number]   Status:  Preliminary result   Visible to patient:  No (not released) Dx:  Alcohol-induced acute pancreatitis wi...   Specimen Information:  Midstream Urine          Component  2d ago   Specimen Description  Midstream Urine     Special Requests  Specimen received in preservative P     Culture  Micro  Abnormal     >100,000 colonies/mL   Klebsiella pneumoniae   Susceptibility testing in progress   P       Culture Micro  Abnormal     >100,000 colonies/mL   Strain 2   Klebsiella pneumoniae   Susceptibility testing in progress   P       [facility]  INFECTIOUS DISEASES DIAGNOSTIC LABORATORY          Specimen Collected: 06/15/20  1:28 AM  Last Resulted: 06/17/20  5:39 AM

## 2020-06-18 NOTE — RESULT ENCOUNTER NOTE
Final Urine Culture Report on 6/17/20  Emergency Dep/Urgent Care discharge antibiotic prescribed: None;  On 6/17/20 per ED Protocol,Rx initiated for Cephalexin (Keflex) 500 mg capsule, 1 capsule (500 mg) by mouth 2 times daily for 7 days  #1. Bacteria, >100,000 colonies/mL Klebsiella pneumoniae, is SUSCEPTIBLE to Antibiotic.    #2. Bacteria, >100,000 colonies/mL Stain 2 Klebsiella pneumoniae, is SUSCEPTIBLE to Antibiotic.    As per [facility] ED Lab Result protocol, no change in antibiotic therapy.

## 2020-06-25 ENCOUNTER — TELEPHONE (OUTPATIENT)
Dept: FAMILY MEDICINE | Facility: CLINIC | Age: 75
End: 2020-06-25

## 2020-06-26 ENCOUNTER — DOCUMENTATION ONLY (OUTPATIENT)
Dept: FAMILY MEDICINE | Facility: CLINIC | Age: 75
End: 2020-06-26

## 2020-06-26 ENCOUNTER — MYC MEDICAL ADVICE (OUTPATIENT)
Dept: ANTICOAGULATION | Facility: CLINIC | Age: 75
End: 2020-06-26

## 2020-06-26 DIAGNOSIS — I48.20 CHRONIC ATRIAL FIBRILLATION (H): ICD-10-CM

## 2020-06-26 DIAGNOSIS — Z79.01 LONG TERM CURRENT USE OF ANTICOAGULANT THERAPY: ICD-10-CM

## 2020-06-26 NOTE — PROGRESS NOTES
ANTICOAGULATION  MANAGEMENT: Discharge Review    Antoine JESSICA Russo chart reviewed for anticoagulation continuity of care    Hospital Admission on 6/19/20 for Laminectomy.    Discharge disposition: Home    Results:    No results for input(s): INR, [id number], AXA in the last 168 hours.  Anticoagulation inpatient management:     home regimen resumed post procedure on 6/26/20    Anticoagulation discharge instructions:     Warfarin dosing: home regimen continued   Bridging: No   INR goal change: No      Medication changes affecting anticoagulation: No    Additional factors affecting anticoagulation: No    Plan     Patient to resume Coumadin on 6/26/20. Recommended INR recheck on 6/30/20.    My chart message sent.    Anticoagulation Calendar updated    Angela Harrell RN

## 2020-07-03 ENCOUNTER — MYC MEDICAL ADVICE (OUTPATIENT)
Dept: ANTICOAGULATION | Facility: CLINIC | Age: 75
End: 2020-07-03

## 2020-07-03 ENCOUNTER — ANTICOAGULATION THERAPY VISIT (OUTPATIENT)
Dept: ANTICOAGULATION | Facility: CLINIC | Age: 75
End: 2020-07-03
Payer: COMMERCIAL

## 2020-07-03 DIAGNOSIS — I48.20 CHRONIC ATRIAL FIBRILLATION (H): ICD-10-CM

## 2020-07-03 DIAGNOSIS — Z79.01 LONG TERM CURRENT USE OF ANTICOAGULANT THERAPY: ICD-10-CM

## 2020-07-03 LAB
CAPILLARY BLOOD COLLECTION: NORMAL
INR PPP: 2.4 (ref 0.86–1.14)

## 2020-07-03 PROCEDURE — 85610 PROTHROMBIN TIME: CPT | Performed by: FAMILY MEDICINE

## 2020-07-03 PROCEDURE — 99207 ZZC NO CHARGE NURSE ONLY: CPT

## 2020-07-03 PROCEDURE — 36416 COLLJ CAPILLARY BLOOD SPEC: CPT | Performed by: FAMILY MEDICINE

## 2020-07-03 NOTE — PROGRESS NOTES
ANTICOAGULATION FOLLOW-UP CLINIC VISIT    Patient Name:  Antoine Russo  Date:  7/3/2020  Contact Type:  My chart    SUBJECTIVE:  Patient Findings     Positives:   Extra doses    Comments:   My chart message sent.   Patient will continue weekly maintenance dose. INR is therapeutic.   Recheck in 2 weeks.        Clinical Outcomes     Negatives:   Major bleeding event, Thromboembolic event, Anticoagulation-related hospital admission, Anticoagulation-related ED visit, Anticoagulation-related fatality    Comments:   My chart message sent.   Patient will continue weekly maintenance dose. INR is therapeutic.   Recheck in 2 weeks.           OBJECTIVE    Recent labs: (last 7 days)     20  0821   INR 2.40*       ASSESSMENT / PLAN  INR assessment THER    Recheck INR In: 2 WEEKS    INR Location Outside lab      Anticoagulation Summary  As of 7/3/2020    INR goal:   2.0-3.0   TTR:   47.3 % (1 y)   INR used for dosin.40 (7/3/2020)   Warfarin maintenance plan:   1.25 mg (2.5 mg x 0.5) every Mon; 2.5 mg (2.5 mg x 1) all other days   Full warfarin instructions:   1.25 mg every Mon; 2.5 mg all other days   Weekly warfarin total:   16.25 mg   No change documented:   Angela Harrell RN   Plan last modified:   Aparna Kunz RN (3/26/2020)   Next INR check:   2020   Priority:   Maintenance   Target end date:   Indefinite    Indications    Chronic atrial fibrillation (H) [I48.20]  Long term current use of anticoagulant therapy [Z79.01]             Anticoagulation Episode Summary     INR check location:       Preferred lab:       Send INR reminders to:   [facility]    Comments:   * 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 No bandaid. 2-3 light beers daily.       Anticoagulation Care Providers     Provider Role Specialty Phone number    Katie Martino MD Referring [facility] [phone]            See the Encounter Report to view Anticoagulation Flowsheet and Dosing Calendar (Go to  Encounters tab in chart review, and find the Anticoagulation Therapy Visit)        Angela Harrell RN

## 2020-07-26 ENCOUNTER — OFFICE VISIT (OUTPATIENT)
Dept: URGENT CARE | Facility: URGENT CARE | Age: 75
End: 2020-07-26
Payer: COMMERCIAL

## 2020-07-26 VITALS
BODY MASS INDEX: 29.41 KG/M2 | TEMPERATURE: 97.1 F | SYSTOLIC BLOOD PRESSURE: 128 MMHG | DIASTOLIC BLOOD PRESSURE: 76 MMHG | WEIGHT: 205 LBS | HEART RATE: 72 BPM

## 2020-07-26 DIAGNOSIS — R10.12 LUQ ABDOMINAL PAIN: ICD-10-CM

## 2020-07-26 DIAGNOSIS — R10.84 GENERALIZED ABDOMINAL PAIN: Primary | ICD-10-CM

## 2020-07-26 LAB
ALBUMIN UR-MCNC: 30 MG/DL
APPEARANCE UR: CLEAR
BILIRUB UR QL STRIP: NEGATIVE
COLOR UR AUTO: YELLOW
GLUCOSE UR STRIP-MCNC: NEGATIVE MG/DL
HGB UR QL STRIP: ABNORMAL
KETONES UR STRIP-MCNC: NEGATIVE MG/DL
LEUKOCYTE ESTERASE UR QL STRIP: NEGATIVE
LIPASE SERPL-CCNC: 931 U/L (ref 73–393)
NITRATE UR QL: NEGATIVE
PH UR STRIP: 5.5 PH (ref 5–7)
RBC #/AREA URNS AUTO: NORMAL /HPF
SOURCE: ABNORMAL
SP GR UR STRIP: 1.02 (ref 1–1.03)
UROBILINOGEN UR STRIP-ACNC: 0.2 EU/DL (ref 0.2–1)
WBC #/AREA URNS AUTO: NORMAL /HPF

## 2020-07-26 PROCEDURE — 36415 COLL VENOUS BLD VENIPUNCTURE: CPT | Performed by: EMERGENCY MEDICINE

## 2020-07-26 PROCEDURE — 81001 URINALYSIS AUTO W/SCOPE: CPT | Performed by: EMERGENCY MEDICINE

## 2020-07-26 PROCEDURE — 83690 ASSAY OF LIPASE: CPT | Performed by: EMERGENCY MEDICINE

## 2020-07-26 PROCEDURE — 99214 OFFICE O/P EST MOD 30 MIN: CPT | Performed by: EMERGENCY MEDICINE

## 2020-07-26 NOTE — PATIENT INSTRUCTIONS
Go to the emergency department for worsening pain, chills, fever    Recheck tomorrow in the clinic.  You will be scheduled for a phone appointment with Dr. Rufino donaldson  Patient Education     Abdominal Pain    Abdominal pain is pain in the stomach or belly area. Everyone has this pain from time to time. In many cases it goes away on its own. But abdominal pain can sometimes be due to a serious problem, such as appendicitis. So it s important to know when to get help.  Causes of abdominal pain  There are many possible causes of abdominal pain. Common causes in adults include:  Constipation, diarrhea, or gas  Stomach acid flowing back up into the esophagus (acid reflux or heartburn)  Severe acid reflux, called GERD (gastroesophageal reflux disease)  A sore in the lining of the stomach or small intestine (peptic ulcer)  Inflammation of the gallbladder, liver, or pancreas  Gallstones or kidney stones  Appendicitis   Intestinal blockage   An internal organ pushing through a muscle or other tissue (hernia)  Urinary tract infections  In women, menstrual cramps, fibroids, ovarian cysts, pelvic inflammatory disease, or endometriosis  Inflammation or infection of the intestines, including Crohn's disease and ulcerative colitis  Irritable bowel syndrome  Diagnosing the cause of abdominal pain  Your healthcare provider will give you a physical exam help find the cause of your pain. If needed, you will have tests. Belly pain has many possible causes. So it can be hard to find the reason for your pain. Giving details about your pain can help. Tell your provider where and when you feel the pain, and what makes it better or worse. Also let your provider know if you have other symptoms such as:  Fever  Tiredness  Upset stomach (nausea)  Vomiting  Changes in bathroom habits  Blood in the stool or black, tarry stool  Weight loss that you can't explain (involuntary weight loss?)  Also report any family history of stomach or intestinal  problems, or cancers. Tell your provider about all your alcohol use and drug use. Tell your provider about all medicines you use, including herbs, vitamins, and supplements.   Treating abdominal pain  Some causes of pain need emergency medical treatment right away. These include appendicitis or a bowel blockage. Other problems can be treated with rest, fluids, or medicines. Your healthcare provider can give you specific instructions for treatment or self-care based on what is causing your pain.     If you have vomiting or diarrhea, sip water or other clear fluids. When you are ready to eat solid foods again, start with small amounts of easy-to-digest, low-fat foods. These include apple sauce, toast, or crackers.  When to get medical care  Call 911 or go to the hospital right away if you:  Can t pass stool and are vomiting  Are vomiting blood or have bloody diarrhea or black, tarry diarrhea  Have chest, neck, or shoulder pain  Feel like you might pass out  Have pain in your shoulder blades with nausea  Have sudden, severe belly pain  Have new, severe pain unlike any you have felt before  Have a belly that is rigid, hard, and hurts to touch  Call your healthcare provider if you have:  Pain for more than 5 days  Bloating for more than 2 days  Diarrhea for more than 5 days  A fever of 100.4 F (38 C) or higher, or as directed by your healthcare provider  Pain that gets worse  Weight loss for no reason  Continued lack of appetite  Blood in your stool  How to prevent abdominal pain  Here are some tips to help prevent abdominal pain:  Eat smaller amounts of food at each meal.  Don't eat greasy, fried, or other high-fat foods.  Don't eat foods that give you gas.  Exercise regularly.  Drink plenty of fluids.  To help prevent GERD symptoms:  Quit smoking.  Reduce alcohol and foods that increase stomach acid.  Don't use aspirin or over-the-counter pain and fever medicines, if possible. This includes nonsteroidal  anti-inflammatory drugs (NSAIDs).  Lose excess weight.  Finish eating at least 2 hours before you go to bed or lie down.  Raise the head of your bed.  Date Last Reviewed: 7/1/2016 2000-2019 [employer]. [address] [place] [address], [place] [address]. All rights reserved. This information is not intended as a substitute for professional medical care. Always follow your healthcare professional's instructions.

## 2020-07-26 NOTE — PROGRESS NOTES
HPI: Patient is a 74-year-old male who presents with a 2 to 3-day history of pain in the left upper side of his abdomen.  He describes it is nearly left upper quadrant with some radiation towards his midline.  Patient has had a history of pancreatitis.  He describes surgical excision of what sounds like a pseudocyst in the past.  He is also had bowel resection in the past.  This pain is nonradiating.  He has no history of associated dysuria.  He has had pain of this nature in the past which she described was treated as urinary tract infection with antibiotics and it resolved.  He has no decrease stream, frequency or urgency.  No dysuria.  He has no history of kidney stones.      ROS: See HPI otherwise negative    Allergies   Allergen Reactions     Nkda [No Known Drug Allergies]       Current Outpatient Medications   Medication Sig Dispense Refill     Continuous Blood Gluc  (FREESTYLE LISA 14 DAY READER) JOSE ALBERTO 1 each as needed (use to scan blood sugars from sensor) 1 Device 0     Continuous Blood Gluc Sensor (FREESTYLE LISA 14 DAY SENSOR) MISC 1 each every 14 days 2 each 11     insulin isophane human (NOVOLIN N FLEXPEN RELION) 100 UNIT/ML injection Take 34 units of N in the am and 32 units in the pm. 30 mL 5     insulin pen needle (BD LUIS U/F) 32G X 4 MM miscellaneous USE PEN NEEDLE ONCE DAILY AS DIRECTED 60 each 0     lisinopril (PRINIVIL/ZESTRIL) 5 MG tablet Take 1 tablet (5 mg) by mouth daily 90 tablet 3     metoprolol succinate ER (TOPROL-XL) 50 MG 24 hr tablet TAKE 1 & 1/2 (ONE & ONE-HALF) TABLETS BY MOUTH TWICE DAILY 270 tablet 0     multivitamin, therapeutic with minerals (THERA-VIT-M) TABS Take 1 tablet by mouth daily. 30 each 1     ondansetron (ZOFRAN ODT) 4 MG ODT tab Take 1 tablet (4 mg) by mouth every 8 hours as needed for nausea 2 tablet 0     ondansetron (ZOFRAN ODT) 4 MG ODT tab Take 1 tablet (4 mg) by mouth every 8 hours as needed for nausea 10 tablet 0     oxyCODONE (ROXICODONE) 5 MG tablet  Take 1 tablet (5 mg) by mouth every 6 hours as needed for pain 2 tablet 0     oxyCODONE (ROXICODONE) 5 MG tablet Take 1 tablet (5 mg) by mouth every 6 hours as needed for pain 5 tablet 0     rosuvastatin (CRESTOR) 10 MG tablet Take 1 tablet (10 mg) by mouth daily 90 tablet 3     tamsulosin (FLOMAX) 0.4 MG capsule TAKE 1 CAPSULE BY MOUTH ONCE DAILY -  NEEDS  BP  RECHECK  NOW 90 capsule 1     vitamin B-12 (CYANOCOBALAMIN) 100 MCG tablet Take 100 mcg by mouth daily       warfarin ANTICOAGULANT (COUMADIN) 2.5 MG tablet TAKE 1/2 TABLET (1.25MG)  EVERY MONDAY, 1 TABLET BY MOUTH ALL OTHER DAYS OR AS DIRECTED BY THE ANTICOAGULATION CLINIC. 85 tablet 0     ASPIRIN NOT PRESCRIBED (INTENTIONAL) Antiplatelet medication not prescribed intentionally due to Current anticoagulant therapy (warfarin/enoxaparin)       blood glucose (CONTOUR NEXT TEST) test strip Use to test blood sugar 2 times daily or as directed. 100 each 11     Continuous Blood Gluc  (DEXCOM G6 ) JOSE ALEBRTO Use to read blood sugars as per 's instructions. 1 each 0     Continuous Blood Gluc Sensor (DEXCOM G6 SENSOR) MISC Change every 10 days. 3 each 11     Continuous Blood Gluc Transmit (DEXCOM G6 TRANSMITTER) MISC Change every 3 months. 1 each 3         PE: Physical exam reveals a 74-year-old male sitting in a chair that appears no acute distress.  He does describe his pain as a 5 on a 1-10 scale.  HEENT reveals no scleral icterus.  Abdomen is very minimally tender in the left periumbilical and left upper quadrant region.  He is very slightly tender in the hypogastric region.  There is clearly no distention or peritoneal irritation.  Skin warm and dry.        TREATMENT: UA shows small RBCs but no white cells.  No esterase or nitrite.  Lipase drawn and sent      ASSESSMENT: Abdominal pain of uncertain etiology.  Differential diagnosis may be that of pancreatitis, proximal left ureteral stone, or some other etiology which may require more  definitive evaluation.  I felt patient is comfortable to be at home and has set up a phone appointment with Dr. Martino tomorrow.      DIAGNOSIS: Abdominal pain      PLAN: Report to the emergency room immediately for any worsening pain.  Speak with Dr. Martino tomorrow morning to discuss plan for reevaluation and possible more definitive testing.

## 2020-07-26 NOTE — NURSING NOTE
"Chief Complaint   Patient presents with     Abdominal Pain     Starts on left and comes to center.  Hx of 2 UTI's this year.  Thinking is another UTI.  No urinary symptoms otherwise        Initial /76 (BP Location: Right arm, Patient Position: Chair, Cuff Size: Adult Regular)   Pulse 72   Temp 97.1  F (36.2  C) (Tympanic)   Wt 93 kg (205 lb)   BMI 29.41 kg/m   Estimated body mass index is 29.41 kg/m  as calculated from the following:    Height as of 6/14/20: 1.778 m (5' 10\").    Weight as of this encounter: 93 kg (205 lb).    Patient presents to the clinic using No DME    Health Maintenance that is potentially due pending provider review:  NONE    n/a    Is there anyone who you would like to be able to receive your results? No  If yes have patient fill out VIDYA    Chelsey Jeff M.A.    "

## 2020-07-27 ENCOUNTER — VIRTUAL VISIT (OUTPATIENT)
Dept: FAMILY MEDICINE | Facility: CLINIC | Age: 75
End: 2020-07-27
Payer: COMMERCIAL

## 2020-07-27 DIAGNOSIS — Z79.4 TYPE 2 DIABETES MELLITUS WITH DIABETIC NEUROPATHY, WITH LONG-TERM CURRENT USE OF INSULIN (H): ICD-10-CM

## 2020-07-27 DIAGNOSIS — K85.20 ALCOHOL-INDUCED ACUTE PANCREATITIS WITHOUT INFECTION OR NECROSIS: Primary | ICD-10-CM

## 2020-07-27 DIAGNOSIS — E11.40 TYPE 2 DIABETES MELLITUS WITH DIABETIC NEUROPATHY, WITH LONG-TERM CURRENT USE OF INSULIN (H): ICD-10-CM

## 2020-07-27 PROCEDURE — 99214 OFFICE O/P EST MOD 30 MIN: CPT | Mod: 95 | Performed by: FAMILY MEDICINE

## 2020-07-27 NOTE — PATIENT INSTRUCTIONS
No alcohol  Liquid diet (bowel rest)  No insulin, if blood sugars get over 150, then can do small amounts, like 10 units    As pain gets better, can try a low fat diet    If worsens, to the emergency department   If doing ok, shoot me a mychart to update me Thurs or Friday

## 2020-07-27 NOTE — PROGRESS NOTES
"Antoine Russo is a 74 year old male who is being evaluated via a billable telephone visit.      The patient has been notified of following:     \"This telephone visit will be conducted via a call between you and your physician/provider. We have found that certain health care needs can be provided without the need for a physical exam.  This service lets us provide the care you need with a short phone conversation.  If a prescription is necessary we can send it directly to your pharmacy.  If lab work is needed we can place an order for that and you can then stop by our lab to have the test done at a later time.    Telephone visits are billed at different rates depending on your insurance coverage. During this emergency period, for some insurers they may be billed the same as an in-person visit.  Please reach out to your insurance provider with any questions.    If during the course of the call the physician/provider feels a telephone visit is not appropriate, you will not be charged for this service.\"    Patient has given verbal consent for Telephone visit?  Yes    What phone number would you like to be contacted at? [phone]    How would you like to obtain your AVS? Sarah Murray     Antoine Russo is a 74 year old male who presents via phone visit today for the following health issues:    [facility]    ED/UC Followup:    Facility:  [facility]   Date of visit: 7/26/20  Reason for visit: Abdominal Pain   Current Status: He said he is feeling the same as he was yesterday. No change in symptoms.      He was in to urgent care yesterday with left upper quadrant abdominal pain, just below the rib cage that radiates into the center. Seems a little lower than his usual pancreatitis. Some nausea, no vomiting. If sits awhile is 5-6/10 on pain scale, has to change positions. No diarrhea. No fever.   His amylase was elevated at 931.   He has been drinking 2-3 beers a day.     Recent Labs   Lab " Test 06/14/20  2244 05/12/20  0917 02/04/20  0836 01/25/20  0017 10/17/19  0827  05/22/19  1159  01/17/19  0834  10/22/18  1651   A1C  --  6.3* 6.5*  --  9.0*   < >  --    < >  --    < > 6.9*   LDL  --   --  74  --   --   --  82  --   --   --  90   HDL  --   --  45  --   --   --  31*  --   --   --  43   TRIG  --   --  121  --   --   --  188*  --   --   --  148   ALT 22  --  28 25  --    < >  --    < > 31   < >  --    CR 0.96  --  0.98 0.98  --    < >  --    < > 0.95   < >  --    GFRESTIMATED 78  --  76 76  --    < >  --    < > 80   < >  --    GFRESTBLACK >90  --  88 89  --    < >  --    < > >90   < >  --    POTASSIUM 4.2  --  5.1 4.8  --    < >  --    < > 4.5   < >  --    TSH  --   --  1.58  --   --   --   --   --  1.58  --   --     < > = values in this interval not displayed.      BP Readings from Last 3 Encounters:   07/26/20 128/76   06/15/20 (!) 185/92   05/12/20 134/60    Wt Readings from Last 3 Encounters:   07/26/20 93 kg (205 lb)   06/14/20 93 kg (205 lb)   05/12/20 97.7 kg (215 lb 6.4 oz)                    Reviewed and updated as needed this visit by Provider  Tobacco  Allergies  Meds  Problems  Med Hx  Surg Hx  Fam Hx         Review of Systems   ROS: 5 point ROS negative except as noted above in HPI, including Gen., Resp., CV, GI &  system review.        Objective   Reported vitals:  There were no vitals taken for this visit.   healthy, alert and no distress  PSYCH: Alert and oriented times 3; coherent speech, normal   rate and volume, able to articulate logical thoughts, able   to abstract reason, no tangential thoughts, no hallucinations   or delusions  His affect is normal  RESP: No cough, no audible wheezing, able to talk in full sentences  Remainder of exam unable to be completed due to telephone visits    Diagnostic Test Results:  Labs reviewed in Epic        Assessment/Plan:    1. Alcohol-induced acute pancreatitis without infection or necrosis  Lipase elevated  See patient instructions  below    2. Type 2 diabetes mellitus with diabetic neuropathy, with long-term current use of insulin (H)  Hold insulin while NPO or reduce if blood sugars elevated      Patient Instructions   No alcohol  Liquid diet (bowel rest)  No insulin, if blood sugars get over 150, then can do small amounts, like 10 units    As pain gets better, can try a low fat diet    If worsens, to the emergency department   If doing ok, shoot me a mychart to update me Thurs or Friday         Return in about 1 week (around 8/3/2020), or if symptoms worsen or fail to improve.      Phone call duration:  16 minutes    Katie Gross MD

## 2020-08-04 ENCOUNTER — HOSPITAL ENCOUNTER (INPATIENT)
Facility: CLINIC | Age: 75
LOS: 3 days | Discharge: HOME | End: 2020-08-07
Attending: PHYSICIAN ASSISTANT | Admitting: FAMILY MEDICINE
Payer: COMMERCIAL

## 2020-08-04 ENCOUNTER — APPOINTMENT (OUTPATIENT)
Dept: CT IMAGING | Facility: CLINIC | Age: 75
End: 2020-08-04
Attending: PHYSICIAN ASSISTANT
Payer: COMMERCIAL

## 2020-08-04 DIAGNOSIS — K85.20 ALCOHOL-INDUCED ACUTE PANCREATITIS WITHOUT INFECTION OR NECROSIS: ICD-10-CM

## 2020-08-04 DIAGNOSIS — K85.90 ACUTE PANCREATITIS: ICD-10-CM

## 2020-08-04 DIAGNOSIS — Z79.01 LONG TERM (CURRENT) USE OF ANTICOAGULANTS: ICD-10-CM

## 2020-08-04 DIAGNOSIS — I48.20 CHRONIC ATRIAL FIBRILLATION (H): ICD-10-CM

## 2020-08-04 DIAGNOSIS — Z03.818 ENCNTR FOR OBS FOR SUSP EXPSR TO OTH BIOLG AGENTS RULED OUT: ICD-10-CM

## 2020-08-04 LAB
ALBUMIN SERPL-MCNC: 3.4 G/DL (ref 3.4–5)
ALP SERPL-CCNC: 62 U/L (ref 40–150)
ALT SERPL W P-5'-P-CCNC: 27 U/L (ref 0–70)
ANION GAP SERPL CALCULATED.3IONS-SCNC: 2 MMOL/L (ref 3–14)
ANION GAP SERPL CALCULATED.3IONS-SCNC: 3 MMOL/L (ref 3–14)
ANISOCYTOSIS BLD QL SMEAR: ABNORMAL
AST SERPL W P-5'-P-CCNC: 39 U/L (ref 0–45)
BASOPHILS # BLD AUTO: 0 10E9/L (ref 0–0.2)
BASOPHILS NFR BLD AUTO: 0 %
BILIRUB SERPL-MCNC: 0.6 MG/DL (ref 0.2–1.3)
BUN SERPL-MCNC: 15 MG/DL (ref 7–30)
BUN SERPL-MCNC: 16 MG/DL (ref 7–30)
CALCIUM SERPL-MCNC: 8.9 MG/DL (ref 8.5–10.1)
CALCIUM SERPL-MCNC: 9.3 MG/DL (ref 8.5–10.1)
CHLORIDE SERPL-SCNC: 105 MMOL/L (ref 94–109)
CHLORIDE SERPL-SCNC: 106 MMOL/L (ref 94–109)
CO2 SERPL-SCNC: 28 MMOL/L (ref 20–32)
CO2 SERPL-SCNC: 29 MMOL/L (ref 20–32)
CREAT SERPL-MCNC: 0.68 MG/DL (ref 0.66–1.25)
CREAT SERPL-MCNC: 0.74 MG/DL (ref 0.66–1.25)
DIFFERENTIAL METHOD BLD: ABNORMAL
EOSINOPHIL # BLD AUTO: 0.3 10E9/L (ref 0–0.7)
EOSINOPHIL NFR BLD AUTO: 2 %
ERYTHROCYTE [DISTWIDTH] IN BLOOD BY AUTOMATED COUNT: 18.3 % (ref 10–15)
GFR SERPL CREATININE-BSD FRML MDRD: >90 ML/MIN/{1.73_M2}
GFR SERPL CREATININE-BSD FRML MDRD: >90 ML/MIN/{1.73_M2}
GLUCOSE BLDC GLUCOMTR-MCNC: 144 MG/DL (ref 70–99)
GLUCOSE SERPL-MCNC: 161 MG/DL (ref 70–99)
GLUCOSE SERPL-MCNC: 186 MG/DL (ref 70–99)
HCT VFR BLD AUTO: 45.7 % (ref 40–53)
HGB BLD-MCNC: 14.6 G/DL (ref 13.3–17.7)
INR PPP: 4.23 (ref 0.86–1.14)
LIPASE SERPL-CCNC: 1317 U/L (ref 73–393)
LYMPHOCYTES # BLD AUTO: 1.8 10E9/L (ref 0.8–5.3)
LYMPHOCYTES NFR BLD AUTO: 13 %
MCH RBC QN AUTO: 30.7 PG (ref 26.5–33)
MCHC RBC AUTO-ENTMCNC: 31.9 G/DL (ref 31.5–36.5)
MCV RBC AUTO: 96 FL (ref 78–100)
MONOCYTES # BLD AUTO: 1.1 10E9/L (ref 0–1.3)
MONOCYTES NFR BLD AUTO: 8 %
MYELOCYTES # BLD: 0.3 10E9/L
MYELOCYTES NFR BLD MANUAL: 2 %
NEUTROPHILS # BLD AUTO: 10.1 10E9/L (ref 1.6–8.3)
NEUTROPHILS NFR BLD AUTO: 75 %
PLATELET # BLD AUTO: 300 10E9/L (ref 150–450)
PLATELET # BLD EST: ABNORMAL 10*3/UL
POTASSIUM SERPL-SCNC: 4.9 MMOL/L (ref 3.4–5.3)
POTASSIUM SERPL-SCNC: 5.9 MMOL/L (ref 3.4–5.3)
PROT SERPL-MCNC: 8.3 G/DL (ref 6.8–8.8)
RBC # BLD AUTO: 4.75 10E12/L (ref 4.4–5.9)
SODIUM SERPL-SCNC: 136 MMOL/L (ref 133–144)
SODIUM SERPL-SCNC: 137 MMOL/L (ref 133–144)
VARIANT LYMPHS BLD QL SMEAR: PRESENT
WBC # BLD AUTO: 13.5 10E9/L (ref 4–11)

## 2020-08-04 PROCEDURE — 00000146 ZZHCL STATISTIC GLUCOSE BY METER IP

## 2020-08-04 PROCEDURE — 85025 COMPLETE CBC W/AUTO DIFF WBC: CPT | Performed by: FAMILY MEDICINE

## 2020-08-04 PROCEDURE — 25800030 ZZH RX IP 258 OP 636: Performed by: FAMILY MEDICINE

## 2020-08-04 PROCEDURE — 99285 EMERGENCY DEPT VISIT HI MDM: CPT | Mod: 25 | Performed by: EMERGENCY MEDICINE

## 2020-08-04 PROCEDURE — 96376 TX/PRO/DX INJ SAME DRUG ADON: CPT | Performed by: EMERGENCY MEDICINE

## 2020-08-04 PROCEDURE — 25000131 ZZH RX MED GY IP 250 OP 636 PS 637: Performed by: FAMILY MEDICINE

## 2020-08-04 PROCEDURE — 83690 ASSAY OF LIPASE: CPT | Performed by: FAMILY MEDICINE

## 2020-08-04 PROCEDURE — 74177 CT ABD & PELVIS W/CONTRAST: CPT

## 2020-08-04 PROCEDURE — 96375 TX/PRO/DX INJ NEW DRUG ADDON: CPT | Performed by: EMERGENCY MEDICINE

## 2020-08-04 PROCEDURE — 25000132 ZZH RX MED GY IP 250 OP 250 PS 637: Performed by: FAMILY MEDICINE

## 2020-08-04 PROCEDURE — 25000128 H RX IP 250 OP 636: Performed by: PHYSICIAN ASSISTANT

## 2020-08-04 PROCEDURE — 80048 BASIC METABOLIC PNL TOTAL CA: CPT | Performed by: PHYSICIAN ASSISTANT

## 2020-08-04 PROCEDURE — 80053 COMPREHEN METABOLIC PANEL: CPT | Performed by: FAMILY MEDICINE

## 2020-08-04 PROCEDURE — 96374 THER/PROPH/DIAG INJ IV PUSH: CPT | Performed by: EMERGENCY MEDICINE

## 2020-08-04 PROCEDURE — 25000125 ZZHC RX 250: Performed by: FAMILY MEDICINE

## 2020-08-04 PROCEDURE — 93005 ELECTROCARDIOGRAM TRACING: CPT | Performed by: EMERGENCY MEDICINE

## 2020-08-04 PROCEDURE — 25000125 ZZHC RX 250: Performed by: PHYSICIAN ASSISTANT

## 2020-08-04 PROCEDURE — HZ2ZZZZ DETOXIFICATION SERVICES FOR SUBSTANCE ABUSE TREATMENT: ICD-10-PCS | Performed by: FAMILY MEDICINE

## 2020-08-04 PROCEDURE — 25800030 ZZH RX IP 258 OP 636: Performed by: PHYSICIAN ASSISTANT

## 2020-08-04 PROCEDURE — 12000000 ZZH R&B MED SURG/OB

## 2020-08-04 PROCEDURE — 83735 ASSAY OF MAGNESIUM: CPT | Performed by: FAMILY MEDICINE

## 2020-08-04 PROCEDURE — 99223 1ST HOSP IP/OBS HIGH 75: CPT | Mod: AI | Performed by: FAMILY MEDICINE

## 2020-08-04 PROCEDURE — U0003 INFECTIOUS AGENT DETECTION BY NUCLEIC ACID (DNA OR RNA); SEVERE ACUTE RESPIRATORY SYNDROME CORONAVIRUS 2 (SARS-COV-2) (CORONAVIRUS DISEASE [COVID-19]), AMPLIFIED PROBE TECHNIQUE, MAKING USE OF HIGH THROUGHPUT TECHNOLOGIES AS DESCRIBED BY CMS-2020-01-R: HCPCS | Performed by: PHYSICIAN ASSISTANT

## 2020-08-04 PROCEDURE — 85610 PROTHROMBIN TIME: CPT | Performed by: PHYSICIAN ASSISTANT

## 2020-08-04 PROCEDURE — 96361 HYDRATE IV INFUSION ADD-ON: CPT | Performed by: EMERGENCY MEDICINE

## 2020-08-04 PROCEDURE — 93010 ELECTROCARDIOGRAM REPORT: CPT | Mod: Z6 | Performed by: EMERGENCY MEDICINE

## 2020-08-04 PROCEDURE — 25000128 H RX IP 250 OP 636: Performed by: FAMILY MEDICINE

## 2020-08-04 RX ORDER — ROSUVASTATIN CALCIUM 5 MG/1
10 TABLET, COATED ORAL DAILY
Status: DISCONTINUED | OUTPATIENT
Start: 2020-08-04 | End: 2020-08-07 | Stop reason: HOSPADM

## 2020-08-04 RX ORDER — ONDANSETRON 2 MG/ML
4 INJECTION INTRAMUSCULAR; INTRAVENOUS EVERY 30 MIN PRN
Status: DISCONTINUED | OUTPATIENT
Start: 2020-08-04 | End: 2020-08-07 | Stop reason: HOSPADM

## 2020-08-04 RX ORDER — SODIUM CHLORIDE, SODIUM LACTATE, POTASSIUM CHLORIDE, CALCIUM CHLORIDE 600; 310; 30; 20 MG/100ML; MG/100ML; MG/100ML; MG/100ML
INJECTION, SOLUTION INTRAVENOUS CONTINUOUS
Status: DISCONTINUED | OUTPATIENT
Start: 2020-08-04 | End: 2020-08-07 | Stop reason: HOSPADM

## 2020-08-04 RX ORDER — HYDROMORPHONE HYDROCHLORIDE 1 MG/ML
0.5 INJECTION, SOLUTION INTRAMUSCULAR; INTRAVENOUS; SUBCUTANEOUS
Status: COMPLETED | OUTPATIENT
Start: 2020-08-04 | End: 2020-08-04

## 2020-08-04 RX ORDER — MULTIPLE VITAMINS W/ MINERALS TAB 9MG-400MCG
1 TAB ORAL DAILY
Status: DISCONTINUED | OUTPATIENT
Start: 2020-08-04 | End: 2020-08-07 | Stop reason: HOSPADM

## 2020-08-04 RX ORDER — METOCLOPRAMIDE 5 MG/1
5 TABLET ORAL EVERY 6 HOURS PRN
Status: DISCONTINUED | OUTPATIENT
Start: 2020-08-04 | End: 2020-08-07 | Stop reason: HOSPADM

## 2020-08-04 RX ORDER — LORAZEPAM 1 MG/1
1-2 TABLET ORAL EVERY 30 MIN PRN
Status: DISCONTINUED | OUTPATIENT
Start: 2020-08-04 | End: 2020-08-07 | Stop reason: HOSPADM

## 2020-08-04 RX ORDER — LIDOCAINE 40 MG/G
CREAM TOPICAL
Status: DISCONTINUED | OUTPATIENT
Start: 2020-08-04 | End: 2020-08-07 | Stop reason: HOSPADM

## 2020-08-04 RX ORDER — OXYCODONE HYDROCHLORIDE 5 MG/1
5 TABLET ORAL EVERY 6 HOURS PRN
Status: DISCONTINUED | OUTPATIENT
Start: 2020-08-04 | End: 2020-08-04

## 2020-08-04 RX ORDER — LABETALOL 20 MG/4 ML (5 MG/ML) INTRAVENOUS SYRINGE
20 EVERY 4 HOURS PRN
Status: DISCONTINUED | OUTPATIENT
Start: 2020-08-04 | End: 2020-08-07 | Stop reason: HOSPADM

## 2020-08-04 RX ORDER — METOCLOPRAMIDE HYDROCHLORIDE 5 MG/ML
5 INJECTION INTRAMUSCULAR; INTRAVENOUS EVERY 6 HOURS PRN
Status: DISCONTINUED | OUTPATIENT
Start: 2020-08-04 | End: 2020-08-07 | Stop reason: HOSPADM

## 2020-08-04 RX ORDER — PROCHLORPERAZINE 25 MG
12.5 SUPPOSITORY, RECTAL RECTAL EVERY 12 HOURS PRN
Status: DISCONTINUED | OUTPATIENT
Start: 2020-08-04 | End: 2020-08-07 | Stop reason: HOSPADM

## 2020-08-04 RX ORDER — IOPAMIDOL 755 MG/ML
97 INJECTION, SOLUTION INTRAVASCULAR ONCE
Status: COMPLETED | OUTPATIENT
Start: 2020-08-04 | End: 2020-08-04

## 2020-08-04 RX ORDER — LORAZEPAM 2 MG/ML
1-2 INJECTION INTRAMUSCULAR EVERY 30 MIN PRN
Status: DISCONTINUED | OUTPATIENT
Start: 2020-08-04 | End: 2020-08-07 | Stop reason: HOSPADM

## 2020-08-04 RX ORDER — NICOTINE POLACRILEX 4 MG
15-30 LOZENGE BUCCAL
Status: DISCONTINUED | OUTPATIENT
Start: 2020-08-04 | End: 2020-08-07 | Stop reason: HOSPADM

## 2020-08-04 RX ORDER — ONDANSETRON 4 MG/1
4 TABLET, ORALLY DISINTEGRATING ORAL EVERY 8 HOURS PRN
Status: DISCONTINUED | OUTPATIENT
Start: 2020-08-04 | End: 2020-08-07 | Stop reason: HOSPADM

## 2020-08-04 RX ORDER — NALOXONE HYDROCHLORIDE 0.4 MG/ML
.1-.4 INJECTION, SOLUTION INTRAMUSCULAR; INTRAVENOUS; SUBCUTANEOUS
Status: DISCONTINUED | OUTPATIENT
Start: 2020-08-04 | End: 2020-08-07 | Stop reason: HOSPADM

## 2020-08-04 RX ORDER — ONDANSETRON 4 MG/1
4 TABLET, ORALLY DISINTEGRATING ORAL EVERY 6 HOURS PRN
Status: DISCONTINUED | OUTPATIENT
Start: 2020-08-04 | End: 2020-08-07 | Stop reason: HOSPADM

## 2020-08-04 RX ORDER — TAMSULOSIN HYDROCHLORIDE 0.4 MG/1
0.4 CAPSULE ORAL DAILY
Status: DISCONTINUED | OUTPATIENT
Start: 2020-08-04 | End: 2020-08-07 | Stop reason: HOSPADM

## 2020-08-04 RX ORDER — PROCHLORPERAZINE MALEATE 5 MG
5 TABLET ORAL EVERY 6 HOURS PRN
Status: DISCONTINUED | OUTPATIENT
Start: 2020-08-04 | End: 2020-08-04

## 2020-08-04 RX ORDER — MAGNESIUM SULFATE HEPTAHYDRATE 40 MG/ML
4 INJECTION, SOLUTION INTRAVENOUS EVERY 4 HOURS PRN
Status: DISCONTINUED | OUTPATIENT
Start: 2020-08-04 | End: 2020-08-07 | Stop reason: HOSPADM

## 2020-08-04 RX ORDER — DEXTROSE MONOHYDRATE 25 G/50ML
25-50 INJECTION, SOLUTION INTRAVENOUS
Status: DISCONTINUED | OUTPATIENT
Start: 2020-08-04 | End: 2020-08-07 | Stop reason: HOSPADM

## 2020-08-04 RX ORDER — OXYCODONE HYDROCHLORIDE 5 MG/1
5-10 TABLET ORAL
Status: DISCONTINUED | OUTPATIENT
Start: 2020-08-04 | End: 2020-08-07 | Stop reason: HOSPADM

## 2020-08-04 RX ORDER — PROCHLORPERAZINE 25 MG
12.5 SUPPOSITORY, RECTAL RECTAL EVERY 12 HOURS PRN
Status: DISCONTINUED | OUTPATIENT
Start: 2020-08-04 | End: 2020-08-04

## 2020-08-04 RX ORDER — HYDROMORPHONE HYDROCHLORIDE 1 MG/ML
.3-.5 INJECTION, SOLUTION INTRAMUSCULAR; INTRAVENOUS; SUBCUTANEOUS
Status: DISCONTINUED | OUTPATIENT
Start: 2020-08-04 | End: 2020-08-07 | Stop reason: HOSPADM

## 2020-08-04 RX ORDER — ONDANSETRON 4 MG/1
4 TABLET, ORALLY DISINTEGRATING ORAL EVERY 6 HOURS PRN
Status: DISCONTINUED | OUTPATIENT
Start: 2020-08-04 | End: 2020-08-04

## 2020-08-04 RX ORDER — NALOXONE HYDROCHLORIDE 0.4 MG/ML
.1-.4 INJECTION, SOLUTION INTRAMUSCULAR; INTRAVENOUS; SUBCUTANEOUS
Status: DISCONTINUED | OUTPATIENT
Start: 2020-08-04 | End: 2020-08-04

## 2020-08-04 RX ORDER — PROCHLORPERAZINE MALEATE 5 MG
5 TABLET ORAL EVERY 6 HOURS PRN
Status: DISCONTINUED | OUTPATIENT
Start: 2020-08-04 | End: 2020-08-07 | Stop reason: HOSPADM

## 2020-08-04 RX ORDER — UBIDECARENONE 75 MG
100 CAPSULE ORAL DAILY
Status: DISCONTINUED | OUTPATIENT
Start: 2020-08-04 | End: 2020-08-07 | Stop reason: HOSPADM

## 2020-08-04 RX ORDER — ONDANSETRON 2 MG/ML
4 INJECTION INTRAMUSCULAR; INTRAVENOUS ONCE
Status: COMPLETED | OUTPATIENT
Start: 2020-08-04 | End: 2020-08-04

## 2020-08-04 RX ORDER — ONDANSETRON 2 MG/ML
4 INJECTION INTRAMUSCULAR; INTRAVENOUS EVERY 6 HOURS PRN
Status: DISCONTINUED | OUTPATIENT
Start: 2020-08-04 | End: 2020-08-04

## 2020-08-04 RX ORDER — METOPROLOL SUCCINATE 50 MG/1
50 TABLET, EXTENDED RELEASE ORAL DAILY
Status: DISCONTINUED | OUTPATIENT
Start: 2020-08-04 | End: 2020-08-04 | Stop reason: DRUGHIGH

## 2020-08-04 RX ORDER — LISINOPRIL 5 MG/1
5 TABLET ORAL DAILY
Status: DISCONTINUED | OUTPATIENT
Start: 2020-08-04 | End: 2020-08-07 | Stop reason: HOSPADM

## 2020-08-04 RX ORDER — ONDANSETRON 2 MG/ML
4 INJECTION INTRAMUSCULAR; INTRAVENOUS EVERY 6 HOURS PRN
Status: DISCONTINUED | OUTPATIENT
Start: 2020-08-04 | End: 2020-08-07 | Stop reason: HOSPADM

## 2020-08-04 RX ADMIN — SODIUM CHLORIDE, POTASSIUM CHLORIDE, SODIUM LACTATE AND CALCIUM CHLORIDE 1000 ML: 600; 310; 30; 20 INJECTION, SOLUTION INTRAVENOUS at 10:47

## 2020-08-04 RX ADMIN — ONDANSETRON 4 MG: 2 INJECTION INTRAMUSCULAR; INTRAVENOUS at 09:59

## 2020-08-04 RX ADMIN — PROCHLORPERAZINE EDISYLATE 5 MG: 5 INJECTION INTRAMUSCULAR; INTRAVENOUS at 23:02

## 2020-08-04 RX ADMIN — FAMOTIDINE 20 MG: 20 INJECTION, SOLUTION INTRAVENOUS at 11:46

## 2020-08-04 RX ADMIN — IOPAMIDOL 97 ML: 755 INJECTION, SOLUTION INTRAVENOUS at 10:53

## 2020-08-04 RX ADMIN — ONDANSETRON 4 MG: 2 INJECTION INTRAMUSCULAR; INTRAVENOUS at 20:05

## 2020-08-04 RX ADMIN — SODIUM CHLORIDE 64 ML: 9 INJECTION, SOLUTION INTRAVENOUS at 10:53

## 2020-08-04 RX ADMIN — SODIUM CHLORIDE, POTASSIUM CHLORIDE, SODIUM LACTATE AND CALCIUM CHLORIDE 1000 ML: 600; 310; 30; 20 INJECTION, SOLUTION INTRAVENOUS at 11:46

## 2020-08-04 RX ADMIN — PROCHLORPERAZINE EDISYLATE 5 MG: 5 INJECTION INTRAMUSCULAR; INTRAVENOUS at 15:18

## 2020-08-04 RX ADMIN — LISINOPRIL 5 MG: 5 TABLET ORAL at 18:59

## 2020-08-04 RX ADMIN — ROSUVASTATIN CALCIUM 10 MG: 5 TABLET, FILM COATED ORAL at 18:59

## 2020-08-04 RX ADMIN — ONDANSETRON 4 MG: 2 INJECTION INTRAMUSCULAR; INTRAVENOUS at 12:54

## 2020-08-04 RX ADMIN — INSULIN HUMAN 32 UNITS: 100 INJECTION, SUSPENSION SUBCUTANEOUS at 19:10

## 2020-08-04 RX ADMIN — METOPROLOL SUCCINATE 75 MG: 25 TABLET, EXTENDED RELEASE ORAL at 19:56

## 2020-08-04 RX ADMIN — HYDROMORPHONE HYDROCHLORIDE 0.5 MG: 1 INJECTION, SOLUTION INTRAMUSCULAR; INTRAVENOUS; SUBCUTANEOUS at 22:55

## 2020-08-04 RX ADMIN — TAMSULOSIN HYDROCHLORIDE 0.4 MG: 0.4 CAPSULE ORAL at 18:59

## 2020-08-04 RX ADMIN — VITAM B12 100 MCG: 100 TAB at 19:10

## 2020-08-04 RX ADMIN — MULTIPLE VITAMINS W/ MINERALS TAB 1 TABLET: TAB at 18:59

## 2020-08-04 RX ADMIN — HYDROMORPHONE HYDROCHLORIDE 0.5 MG: 1 INJECTION, SOLUTION INTRAMUSCULAR; INTRAVENOUS; SUBCUTANEOUS at 19:01

## 2020-08-04 RX ADMIN — HYDROMORPHONE HYDROCHLORIDE 0.5 MG: 1 INJECTION, SOLUTION INTRAMUSCULAR; INTRAVENOUS; SUBCUTANEOUS at 10:45

## 2020-08-04 RX ADMIN — FOLIC ACID: 5 INJECTION, SOLUTION INTRAMUSCULAR; INTRAVENOUS; SUBCUTANEOUS at 19:02

## 2020-08-04 ASSESSMENT — ENCOUNTER SYMPTOMS
ABDOMINAL PAIN: 1
RESPIRATORY NEGATIVE: 1
VOMITING: 1
MUSCULOSKELETAL NEGATIVE: 1
FEVER: 0
NAUSEA: 1
CONSTITUTIONAL NEGATIVE: 1

## 2020-08-04 ASSESSMENT — ACTIVITIES OF DAILY LIVING (ADL)
FALL_HISTORY_WITHIN_LAST_SIX_MONTHS: NO
RETIRED_COMMUNICATION: 0-->UNDERSTANDS/COMMUNICATES WITHOUT DIFFICULTY
ADLS_ACUITY_SCORE: 11
TRANSFERRING: 0-->INDEPENDENT
AMBULATION: 0-->INDEPENDENT
RETIRED_EATING: 0-->INDEPENDENT
COGNITION: 0 - NO COGNITION ISSUES REPORTED
BATHING: 0-->INDEPENDENT
DRESS: 0-->INDEPENDENT
SWALLOWING: 0-->SWALLOWS FOODS/LIQUIDS WITHOUT DIFFICULTY
TOILETING: 0-->INDEPENDENT

## 2020-08-04 ASSESSMENT — MIFFLIN-ST. JEOR: SCORE: 1689.25

## 2020-08-04 NOTE — ED NOTES
"Patient here with complaints of upper abdominal pain. He states it feels the same as his previous bouts of pancreatitis. He vomited x 2 overnight. The patient is currently at a 3/10. States it \"kicks in at a 10 sometimes\".  "

## 2020-08-04 NOTE — PHARMACY-ANTICOAGULATION SERVICE
Clinical Pharmacy - Warfarin Dosing Consult     Pharmacy has been consulted to manage this patient s warfarin therapy.  Indication: Atrial Fibrillation  Therapy Goal: INR 2-3  OP Anticoag Clinic: [facility]  Warfarin Prior to Admission: Yes  Warfarin PTA Regimen: 1.25 mg M and 2.5 mg ROW  Recent documented change in oral intake/nutrition: Unknown  Dose Comments: INR of 4.23, will HOLD dose    INR   Date Value Ref Range Status   08/04/2020 4.23 (H) 0.86 - 1.14 Final   07/03/2020 2.40 (H) 0.86 - 1.14 Final     Comment:     This test is intended for monitoring Coumadin therapy.  Results are not   accurate in patients with prolonged INR due to factor deficiency.         Recommend HOLDING warfarin   today.  Pharmacy will monitor Antoine Russo daily and order warfarin doses to achieve specified goal.      Please contact pharmacy as soon as possible if the warfarin needs to be held for a procedure or if the warfarin goals change.

## 2020-08-04 NOTE — H&P
[facility]    History and Physical - Hospitalist Service       Date of Admission:  8/4/2020    Assessment & Plan   Antoine Russo is a 74 year old male admitted on 8/4/2020. He presented with abdominal pain and was thought to have pancreatitis.    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  He had a pancreatic duct leak following this.  He had a pancreatic biliary sphincterotomy and aspiration of the pseudocyst in March 2019 that was followed with some episodes of severe pancreatitis.    He was last seen by Dr. Beasley in July 2019 at that time he felt if he would have another episode of pancreatitis he could be hospitalized here at [facility] and once it is resolved he would need a repeat secretin MRCP to assess for recurrent leak.  The ER provider did speak with on-call hepatology today and they requested that we admit the patient and treat him conservatively here.    He does continue to drink some alcohol.  He was seen as an outpatient on 6/14/2020 for pancreatitis.  He was not admitted at that time.    Patient is admitted, made n.p.o. and will be hydrated with lactated Ringer's.    Hypertension with current elevated blood pressure readings  Continue metoprolol and lisinopril.  Use labetalol IV as needed elevated high blood pressure    Gastric wall thickening noted on CT scan  Patient might need an EGD-he will need referral back to hepatic GI after this hospitalization.    Leukocytosis  Admit white blood cell count 13.5.  Likely SIRS related-no overt infection found.    Paroxysmal atrial fibrillation/chronic  anticoagulation/over anticoagulation  Admit INR 4.23-pharmacy to manage.  Currently in sinus rhythm.    Hyperlipidemia  Continue Crestor    BPH  Continue Flomax    Diabetes mellitus type 2  Recent HgbA1c 6.3  Continue NPH insulin and use medium sliding scale  Pt has a functioning continuous glucose sensor in place-ok to use this in place of at least some glucometer readings.    Alcohol abuse syndrome  CIWA order set, iv vitamins  He reports fairly modest alcohol intake recently.    Coronary artery disease  Cath showed moderate coronary disease in 2009, no stents placed    Previous Clostridium difficile enterocolitis  Previous subtotal colectomy and eventual re anastomosis  Apparently had a previous partial colectomy due to complications from Clostridium difficile.  He later had a re-anastomosis following ileostomy.    Recent lumbar discectomy  Done this  last year at [facility] absent  This was removed at the time of his partial pancreatectomy.    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  DVT Prophylaxis: Warfarin  Chavez Catheter: not present  Code Status: full         Disposition Plan   Expected discharge: 2 - 3 days, recommended to prior living arrangement once he has symptomatically improved..  Entered: Baldo Guo MD, MD 08/04/2020, 4:39 PM     The patient's care was discussed with the ED provider and the patient..    Baldo Guo MD, MD  [facility]    ______________________________________________________________________    Chief Complaint   Upper abdominal pain for 24 hours.    History is obtained from the patient    History of Present Illness   Antoine Russo is a 74 year old male who has a history of atrial fibrillation, chronic anticoagulation, hypertension, diabetes mellitus type 2, BPH and complex pancreas issues.    He developed epigastric to left upper abdominal pain 24 hours  prior to admission.  He did have stool and flatus this morning.  He has had nausea and several episodes of vomiting.  He has had no fever, chills, diarrhea, chest pain, shortness of breath or urinary tract symptoms.  Evaluation in the emergency room revealed a lipase level of 1317.  His white blood cell count was 13.5.  A CT scan showed near complete resolution of edema and inflammatory changes around the pancreas that were seen previously.  He does have stable diffuse gastric wall thickening that was previously seen.     He has had a previous partial pancreatectomy for intraductal papillary mucinous neoplasm.  Following this he developed a pancreatic duct leak. He had a pancreatic biliary sphincterotomy and aspiration of the pseudocyst in March 2019 that was followed with some episodes of severe pancreatitis.     He was last seen by Dr. Beasley in July 2019 at that time he felt if he would have another episode of pancreatitis he could be hospitalized here at [facility] and once it is resolved he would need a repeat secretin MRCP to assess for recurrent leak.  The ER provider did speak with on-call hepatology today and they requested that we admit the patient and treat him conservatively here.    He does continue to drink alcohol and some of his past problems of pancreatitis have been thought to be alcohol related.    Review of Systems    CONSTITUTIONAL: NEGATIVE for fever, chills, change in weight  INTEGUMENTARY/SKIN: NEGATIVE for worrisome rashes, moles or lesions  EYES: NEGATIVE for vision changes or irritation  ENT/MOUTH: NEGATIVE for ear, mouth and throat problems  RESP: NEGATIVE for significant cough or SOB  BREAST: NEGATIVE for masses, tenderness or discharge  CV: NEGATIVE for chest pain, palpitations or peripheral edema  GI: Above  : NEGATIVE for frequency, dysuria, or hematuria  MUSCULOSKELETAL: NEGATIVE for significant arthralgias or myalgia  NEURO: NEGATIVE for weakness, dizziness or  paresthesias  ENDOCRINE: NEGATIVE for temperature intolerance, skin/hair changes  HEME: NEGATIVE for bleeding problems  PSYCHIATRIC: NEGATIVE for changes in mood or affect    Past Medical History    I have reviewed this patient's medical history and updated it with pertinent information if needed.   Past Medical History:   Diagnosis Date     Acute kidney injury (H) 4/17/2019     Acute on chronic pancreatitis (H) 1/23/2018     Bacteriuria with pyuria 4/17/2019     C. difficile colitis      Colon polyp      Colon polyp 8/26/2011    Colonoscopy 8/2011-A sessile polyp was found in the cecum. The polyp was 6 mm in size. The polyp was removed with a hot snare. Resection and retrieval were complete. A sessile polyp was found in the proximal transverse colon. The polyp was 15 mm in size. The polyp was removed with a hot snare. Resection and retrieval were complete. A sessile polyp was found in the sigmoid colon. The polyp was 5 mm     Coronary artery disease      Diabetes mellitus (H)     type 2     Diverticulitis      Diverticulitis of colon 7/17/2007    Colitis on CT Scan 5/2011- [place] Colonoscopy 8/2011 Diverticulitis - Multiple small and large-mouthed diverticula were found in the mid sigmoid colon and at the hepatic flexure. There was narrowing of the colon in association with the diverticular opening. Nena-diverticular erythema was seen. There was evidence of an impacted diverticulum. 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     Noninfectious ileitis      Recurrent pancreatitis        Past Surgical History   I have reviewed this patient's surgical history and updated it with pertinent information if needed.  Past Surgical History:   Procedure Laterality Date     BREAST SURGERY  2008    right breast mass benign     COLONOSCOPY      multiple polyps removed     COLONOSCOPY  8/24/2011     Procedure:COMBINED COLONOSCOPY, REMOVE TUMOR/POLYP/LESION BY SNARE; Surgeon:MILEY ARBOLEDA; Location:[facility]     COLONOSCOPY  12/17/2012    Procedure: COLONOSCOPY;;  Surgeon: Leon Maurer MD;  [facility]     COLONOSCOPY  12/18/2012    Procedure: COLONOSCOPY;;  Surgeon: Leon Maurer MD;  [facility]     DENERVATION OF SPERMATIC CORD MICROSURGICAL Left 5/23/2017    Procedure: DENERVATION OF SPERMATIC CORD MICROSURGICAL;;  Surgeon: Marcio Aggarwal MD;  [facility]     DISSECTION RADICAL NECK BILATERAL  8/2/2012    Procedure: DISSECTION RADICAL NECK BILATERAL;;  Surgeon: Yung Alvares MD;  [facility]     ENDOSCOPIC RETROGRADE CHOLANGIOPANCREATOGRAM N/A 5/10/2016    Procedure: COMBINED ENDOSCOPIC RETROGRADE CHOLANGIOPANCREATOGRAPHY, PLACE TUBE/STENT;  Surgeon: Yovanny Beasley MD;  [facility]     ENDOSCOPIC RETROGRADE CHOLANGIOPANCREATOGRAM N/A 3/29/2018    Procedure: ENDOSCOPIC RETROGRADE CHOLANGIOPANCREATOGRAM;  Endoscopic Retrograde Cholangiopancreatogram, Endoscopic Ultrasound, Biliary Sphincterotomy, Biliary and Pancreatic Stent Placement;  Surgeon: Yovanny Beasley MD;  [facility]     ENDOSCOPIC ULTRASOUND UPPER GASTROINTESTINAL TRACT (GI) N/A 2/3/2016    Procedure: ENDOSCOPIC ULTRASOUND, ESOPHAGOSCOPY / UPPER GASTROINTESTINAL TRACT (GI);  Surgeon: Grabiel Plata MD;  [facility]     ENDOSCOPIC ULTRASOUND UPPER GASTROINTESTINAL TRACT (GI) N/A 3/29/2018    Procedure: ENDOSCOPIC ULTRASOUND, ESOPHAGOSCOPY / UPPER GASTROINTESTINAL TRACT (GI);;  Surgeon: Grabiel Plata MD;  [facility]     ESOPHAGOSCOPY, GASTROSCOPY, DUODENOSCOPY (EGD), COMBINED N/A 2/3/2016    Procedure: COMBINED ENDOSCOPIC ULTRASOUND, ESOPHAGOSCOPY, GASTROSCOPY, DUODENOSCOPY (EGD), FINE NEEDLE ASPIRATE/BIOPSY;  Surgeon: Grabiel Plata MD;  [facility]     ESOPHAGOSCOPY, GASTROSCOPY, DUODENOSCOPY (EGD), COMBINED N/A 6/8/2016    Procedure: COMBINED  ESOPHAGOSCOPY, GASTROSCOPY, DUODENOSCOPY (EGD), REMOVE FOREIGN BODY;  Surgeon: Yovanny Beasley MD;  [facility]     ESOPHAGOSCOPY, GASTROSCOPY, DUODENOSCOPY (EGD), COMBINED N/A 4/17/2018    Procedure: COMBINED ESOPHAGOSCOPY, GASTROSCOPY, DUODENOSCOPY (EGD), REMOVE FOREIGN BODY;  EGD with stent removal;  Surgeon: Grabiel Plata MD;  [facility]     EXCISE LESION INTRAORAL  6/14/2012    Procedure: EXCISE LESION INTRAORAL;  Wide Local Excision Floor of Mouth, Direct Laryngoscopy, Bilateral Astatula's Marsuplization, Split Thickness Skin Graft from right Thigh  Latex Safe;  Surgeon: Gerson Ravi MD;  [facility]     EXCISE LESION INTRAORAL  8/2/2012    Procedure: EXCISE LESION INTRAORAL;  Floor of Mouth Resection, Bilateral Selective Radical Neck Dissection, Tracheostomy, Left Radial Forearm  Free Flap with Alloderm, Nasogastric Feeding Tube Placement,    * Latex Safe*;  Surgeon: Gerson Ravi MD;  [facility]     EXCISE LESION INTRAORAL  12/11/2012    Procedure: EXCISE LESION INTRAORAL;  takedown of oral flap;  Surgeon: Yung Alvares MD;  [facility]     GRAFT FREE VASCULARIZED (LOCATION)  8/2/2012    Procedure: GRAFT FREE VASCULARIZED (LOCATION);;  Surgeon: Yung Alvares MD;  [facility]     GRAFT SKIN SPLIT THICKNESS FROM EXTREMITY  6/14/2012    Procedure: GRAFT SKIN SPLIT THICKNESS FROM EXTREMITY;;  Surgeon: Gerson Ravi MD;  [facility]     LAPAROSCOPIC ILEOSTOMY TAKEDOWN  6/6/2013    Procedure: LAPAROSCOPIC ILEOSTOMY TAKEDOWN;  Laparoscopic Closure of Enterostomy, Guerda's Type with IleoRectal Anastomosis ;  Surgeon: Grabiel Riddle MD;  [facility]     LAPAROTOMY EXPLORATORY  12/20/2012    Procedure: LAPAROTOMY EXPLORATORY;  Exploratory Laparotomy, total abdominal colectomy, ileostomy formation;  Surgeon: Miquel Cannon MD;  [facility]     LARYNGOSCOPY  6/14/2012    Procedure: LARYNGOSCOPY;;  Surgeon: Gerson Ravi MD;  [facility]      "ORTHOPEDIC SURGERY      ganglian cyst left ankle     PANCREATECTOMY, SPLENECTOMY N/A 3/10/2016    Procedure: PANCREATECTOMY, SPLENECTOMY;  Surgeon: Nael Abel MD;  [facility]     SHOULDER SURGERY  , 2008- right rotator cuff,  bone spur on left. Dr. Hdez     VARICOCELECTOMY Left 2017    Procedure: VARICOCELECTOMY;  Left Varicocele Repair, Denervation of Left Testis;  Surgeon: Marcio Aggarwal MD;  Location:  OR       Social History   I have reviewed this patient's social history and updated it with pertinent information if needed.  Social History     Tobacco Use     Smoking status: Former Smoker     Packs/day: 1.00     Years: 40.00     Pack years: 40.00     Types: Cigarettes     Last attempt to quit: 2006     Years since quittin.7     Smokeless tobacco: Never Used   Substance Use Topics     Alcohol use: Not Currently     Comment: \"1 beer on Thanksgiving day\"     Drug use: Yes     Comment: 3 beers daily       Family History   I have reviewed this patient's family history and updated it with pertinent information if needed.  Family History   Problem Relation Age of Onset     Diabetes Sister         onset age 50     Alzheimer Disease Mother          80     Alzheimer Disease Father          85     Diabetes Other         nephew type 1     Diabetes Other      Aneurysm Sister      Anesthesia Reaction No family hx of      Colon Cancer No family hx of      Colon Polyps No family hx of      Crohn's Disease No family hx of      Ulcerative Colitis No family hx of        Prior to Admission Medications   Prior to Admission Medications   Prescriptions Last Dose Informant Patient Reported? Taking?   ASPIRIN NOT PRESCRIBED (INTENTIONAL)  Self Yes No   Sig: Antiplatelet medication not prescribed intentionally due to Current anticoagulant therapy (warfarin/enoxaparin)   Continuous Blood Gluc  (FREESTYLE LISA 14 DAY READER) JOSE ALBERTO   No No   Si each as needed (use to scan " blood sugars from sensor)   Continuous Blood Gluc Sensor (FREESTYLE LISA 14 DAY SENSOR) [facility] [place]   No No   Si each every 14 days   insulin isophane human (NOVOLIN N FLEXPEN RELION) 100 UNIT/ML injection   No No   Sig: Take 34 units of N in the am and 32 units in the pm.   insulin pen needle (BD LUIS U/F) 32G X 4 MM miscellaneous   No No   Sig: USE PEN NEEDLE ONCE DAILY AS DIRECTED   lisinopril (PRINIVIL/ZESTRIL) 5 MG tablet   No No   Sig: Take 1 tablet (5 mg) by mouth daily   metoprolol succinate ER (TOPROL-XL) 50 MG 24 hr tablet   No No   Sig: TAKE 1 & 1/2 (ONE & ONE-HALF) TABLETS BY MOUTH TWICE DAILY   multivitamin, therapeutic with minerals (THERA-VIT-M) TABS  Self No No   Sig: Take 1 tablet by mouth daily.   ondansetron (ZOFRAN ODT) 4 MG ODT tab   No No   Sig: Take 1 tablet (4 mg) by mouth every 8 hours as needed for nausea   ondansetron (ZOFRAN ODT) 4 MG ODT tab   No No   Sig: Take 1 tablet (4 mg) by mouth every 8 hours as needed for nausea   oxyCODONE (ROXICODONE) 5 MG tablet   No No   Sig: Take 1 tablet (5 mg) by mouth every 6 hours as needed for pain   Patient not taking: Reported on 2020   oxyCODONE (ROXICODONE) 5 MG tablet   No No   Sig: Take 1 tablet (5 mg) by mouth every 6 hours as needed for pain   Patient not taking: Reported on 2020   rosuvastatin (CRESTOR) 10 MG tablet   No No   Sig: Take 1 tablet (10 mg) by mouth daily   tamsulosin (FLOMAX) 0.4 MG capsule   No No   Sig: TAKE 1 CAPSULE BY MOUTH ONCE DAILY -  NEEDS  BP  RECHECK  NOW   vitamin B-12 (CYANOCOBALAMIN) 100 MCG tablet  Self Yes No   Sig: Take 100 mcg by mouth daily   warfarin ANTICOAGULANT (COUMADIN) 2.5 MG tablet   No No   Sig: TAKE 1/2 TABLET (1.25MG)  EVERY MONDAY, 1 TABLET BY MOUTH ALL OTHER DAYS OR AS DIRECTED BY THE ANTICOAGULATION CLINIC.      Facility-Administered Medications: None     Allergies   Allergies   Allergen Reactions     Nkda [No Known Drug Allergies]        Physical Exam   Vital Signs: Temp: 98.2  F (36.8  C)    BP: (!) 204/91 Pulse: 88   Resp: 16 SpO2: 95 % O2 Device: None (Room air)    Weight: 200 lbs 0 oz    General Appearance: Alert, nondistressed  Eyes: Pupils round reactive light  HEENT: Ears negative, oral negative  Respiratory: Clear to auscultation, no respiratory distress  Cardiovascular: Regular without click rub or murmur  GI: Multiple scars, bowel sounds present, mild to moderate tenderness left upper abdomen to epigastrium, no rebound, no distention  Lymph/Hematologic: No adenopathy  Genitourinary: Normal male  Skin: No rash  Musculoskeletal: Trace peripheral edema  Neurologic: Cranial nerves, motor, reflexes-grossly normal  Psychiatric: Normal mood    Data   Data reviewed today: I reviewed all medications, new labs and imaging results over the last 24 hours. I personally reviewed the EKG tracing showing Nonspecific ST depression, sinus rhythm old anterior infarct.    Recent Labs   Lab 08/04/20  1255 08/04/20  0957   WBC  --  13.5*   HGB  --  14.6   MCV  --  96   PLT  --  300   INR  --  4.23*    136   POTASSIUM 4.9 5.9*   CHLORIDE 105 106   CO2 29 28   BUN 15 16   CR 0.68 0.74   ANIONGAP 3 2*   NILSON 8.9 9.3   * 186*   ALBUMIN  --  3.4   PROTTOTAL  --  8.3   BILITOTAL  --  0.6   ALKPHOS  --  62   ALT  --  27   AST  --  39   LIPASE  --  1,317*

## 2020-08-04 NOTE — PROGRESS NOTES
"[facility] ADMISSION NOTE    Patient admitted to room 2305 at approximately 1755 via cart from emergency room. Patient was accompanied by spouse and transport tech.     Verbal SBAR report received from RN prior to patient arrival.     Patient ambulated to bed independently. Patient alert and oriented X 3. The patient is not having any pain. 0-10 Pain Scale: 3. Admission vital signs: Blood pressure (!) 178/76, pulse 78, temperature 97.2  F (36.2  C), temperature source Oral, resp. rate 18, height 1.778 m (5' 10\"), weight 93.3 kg (205 lb 11 oz), SpO2 94 %. Patient and spouse were oriented to plan of care, call light, bed controls, tv, telephone, bathroom and visiting hours.     Risk Assessment    The following safety risks were identified during admission: none. Yellow risk band applied: NO.     Skin Initial Assessment    This writer admitted this patient and completed a full skin assessment and Romain score in the Adult PCS flowsheet. Appropriate interventions initiated as needed.     Secondary skin check completed by Pamela SORENSEN.         Education    Patient has a [place] to Observation order: No  Observation education completed and documented: N/A      Julee Healy RN    "

## 2020-08-04 NOTE — ED PROVIDER NOTES
"  History     Chief Complaint   Patient presents with     Abdominal Pain     mid upper abd pain, n/v, pain started last night, no diarrhea, pt has pancreatits and states his pain feels the same     HPI  Antoine Russo is a 74 year old male with history of pancreatitis, alcohol dependence, chronic atrial fibrillation (on Coumadin), type 2 diabetes mellitus, coronary artery disease, hypertension who presents with complaints of epigastric and left upper quadrant abdominal pain since last night.  Patient also complains of associated nausea and vomiting.  Patient has history of pancreatitis and states these symptoms feel the same.  He continues to drink \"a couple beers\" daily.  Last beer was yesterday.  Patient did not take any of his daily medications this morning due to the vomiting.  Patient denies any hematemesis.  He denies any changes in bowel habits.  Denies fevers, chills, cough, diarrhea, constipation, chest pain, shortness of breath, or urinary symptoms.  Patient reports having history of a bowel resection due to C. difficile colitis.  Pt states he has been told that his pancreatitis is likely alcohol-related.  Most recent abdominal ultrasound in 2019 showed a normal gallbladder.      I reviewed past medical records and pertinent information is copied below:    Below is copied from pt's most recent GI visit last year in which she saw Dr. Beasley at [facility] on 7/8/2019:  \"He is a 73-year-old who had recurrent acute pancreatitis and a peripancreatic small fluid collection after distal pancreatectomy some years ago that was initially complicated by pancreatic duct leak.  In March we did combined EUS/ERCP, aspirated the cyst and then placed a pancreatic biliary sphincterotomy.  Interestingly he got readmitted with severe pancreatitis with peripancreatic necrosis that resolved slowly and got bubbles in it transiently, a stent.  He is actually doing very well now.  He is eating, on pancreatic enzymes, regained some " "of his weight, but plateaued and having no pain issues.  He has not had an alcoholic drink since November, which no doubt was a contributing factor.  He quit smoking 12 years ago. Readmitted in May w another attack of pancreatitis, without necrosis, but with dramatic peripancreatic fluid.  Now totally asymptomatic. Did not take Creon due to cost. No loose stools.  IMP: 10 minutes more than 50% counseling. 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 Since no symptoms in last 6 weeks, would not pursue now. However, if and when he has another episode, would have him go to [facility] for hospitalization, and then once resolved repeat secretin MRCP down here to assess for recurrent leak. Then consider more aggressive ERCP if suggestive. Pt understands. No reason to take Creon at present.\"        Allergies:  Allergies   Allergen Reactions     Nkda [No Known Drug Allergies]        Problem List:    Patient Active Problem List    Diagnosis Date Noted     Anticipated difficulty with intubation 05/23/2017     Priority: High     Class: Chronic     Difficult two hands mask ventilation, intubated multiple times asleep with video laryngoscope. H/o tongue cancer surgery.        Peripheral polyneuropathy 05/12/2020     Priority: Medium     Chronic low back pain with sciatica, sciatica laterality unspecified, unspecified back pain laterality 05/12/2020     Priority: Medium     Alcohol dependence, uncomplicated (H) 02/04/2020     Priority: Medium     Acute right-sided low back pain with left-sided sciatica 02/04/2020     Priority: Medium     Spinal stenosis of lumbar region, unspecified whether neurogenic claudication present 02/04/2020     Priority: Medium     Pancreatic pseudocyst 04/18/2019     Priority: Medium     Acute pancreatitis 10/21/2018     Priority: Medium     Long term current use of anticoagulant therapy " 04/05/2018     Priority: Medium     Recurrent pancreatitis 03/30/2018     Priority: Medium     Chronic atrial fibrillation (H) 01/23/2018     Priority: Medium     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 Please refer if Care Coordination services are needed.    Presenting Problem Signs and Symptoms Treatment Plan   Questions or concerns   during clinic hours   I will call my clinic directly:  [address] [address]  [phone].   Questions or concerns outside clinic hours   I will call the 24 hour nurse line at   [phone] or [facility].   Need to schedule an appointment   I will call the 24 hour scheduling team at [phone] or my clinic directly at [phone].   Same day treatment     I will call my clinic first, nurse line if after hours, urgent care and express care if needed.   Clinic care coordination services (regular clinic hours)   I will call a clinic care coordinator directly:     Shelia Emanuel RN [facility]  [phone]    Brianna Cristobal SW:    [phone]    Or call my clinic at [phone] and ask to speak with care coordination.   Crisis Services: Behavioral or Mental Health  [facility] 24 Hour Phone Line  [phone]    [facility] 24 Hour Crisis Services  [phone]    [facility] [phone]    [facility]   [phone]     Emergency treatment -- Immediately     [facility] 911                Clostridium difficile enterocolitis 12/18/2012     Priority: Medium     Groin fluid collection 12/15/2012     Priority: Medium     CT 12/12- New (since 5/11) collection measuring at least 2.3 cm in diameter and 6.5 cm in length within the right inguinal canal. 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 Gerson Ravi and colleguchi at the  in 2012  NAD 2017  CT scan of the neck every 2-3 years.  - Thyroid labs yearly.  - Carotid ultrasound in three to four years to evaluate for stenosis.       Hyperlipidemia LDL goal <100 10/31/2010     Priority: Medium     CAD (coronary artery disease) 05/26/2009     Priority: Medium     Stress testing 2009 showed inferior wall ischemia.  Cath preformed; identified moderate CAD with stenosis of 40-50% in LAD and RCA.  No stents were placed.  Echo in 01/2018 (done while in Afib with rates of 100-110); EF of 55-60%.  No RWMA.       GERD (gastroesophageal reflux disease) 05/26/2009     Priority: Medium     Hypertrophy of breast 09/25/2007     Priority: Medium     PERS HX TOBACCO USE - quit in 11/06 with chantix 03/15/2007     Priority: Medium     Hypertension, benign essential, goal below 140/90 11/07/2005     Priority: Medium     Patient has only fair bp control and with family history of diabetes will all ace if lab indicated also has bph and may op for psa and not digital exam next yr        Pain in joint, shoulder region 11/07/2005     Priority: Medium     Hypertrophy of prostate without urinary obstruction 11/07/2005     Priority: Medium     Problem list name updated by automated process.  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 difficile colitis      Colon polyp      Colon polyp 8/26/2011     Coronary artery disease      Diabetes mellitus (H)      Diverticulitis      Diverticulitis of colon 7/17/2007     Hypertension      Leukocytosis 4/17/2019     Malignant neoplasm (H)      Noninfectious ileitis      Recurrent pancreatitis        Past Surgical History:    Past Surgical History:   Procedure Laterality Date     BREAST SURGERY  2008    right breast mass benign     COLONOSCOPY      multiple polyps removed     COLONOSCOPY  8/24/2011    Procedure:COMBINED COLONOSCOPY, REMOVE TUMOR/POLYP/LESION BY SNARE; Surgeon:MILEY ARBOLEDA; Location:[facility]     COLONOSCOPY  12/17/2012    Procedure: COLONOSCOPY;;  Surgeon: Leon Maurer MD;  [facility]     COLONOSCOPY  12/18/2012    Procedure: COLONOSCOPY;;  Surgeon: Leon Maurer MD;  [facility]     DENERVATION OF SPERMATIC CORD MICROSURGICAL Left 5/23/2017    Procedure: DENERVATION OF SPERMATIC CORD MICROSURGICAL;;  Surgeon: Marcio Aggarwal MD;  [facility]     DISSECTION RADICAL NECK BILATERAL  8/2/2012    Procedure: DISSECTION RADICAL NECK BILATERAL;;  Surgeon: Yung Alvares MD;  [facility]     ENDOSCOPIC RETROGRADE CHOLANGIOPANCREATOGRAM N/A 5/10/2016    Procedure: COMBINED ENDOSCOPIC RETROGRADE CHOLANGIOPANCREATOGRAPHY, PLACE TUBE/STENT;  Surgeon: Yovanny Beasley MD;  [facility]     ENDOSCOPIC RETROGRADE CHOLANGIOPANCREATOGRAM N/A 3/29/2018    Procedure: ENDOSCOPIC RETROGRADE CHOLANGIOPANCREATOGRAM;  Endoscopic Retrograde Cholangiopancreatogram, Endoscopic Ultrasound, Biliary Sphincterotomy, Biliary and Pancreatic Stent Placement;  Surgeon: Yovanny Beasley,  MD;  [facility]     ENDOSCOPIC ULTRASOUND UPPER GASTROINTESTINAL TRACT (GI) N/A 2/3/2016    Procedure: ENDOSCOPIC ULTRASOUND, ESOPHAGOSCOPY / UPPER GASTROINTESTINAL TRACT (GI);  Surgeon: Grabiel Plata MD;  [facility]     ENDOSCOPIC ULTRASOUND UPPER GASTROINTESTINAL TRACT (GI) N/A 3/29/2018    Procedure: ENDOSCOPIC ULTRASOUND, ESOPHAGOSCOPY / UPPER GASTROINTESTINAL TRACT (GI);;  Surgeon: Grabiel Plata MD;  [facility]     ESOPHAGOSCOPY, GASTROSCOPY, DUODENOSCOPY (EGD), COMBINED N/A 2/3/2016    Procedure: COMBINED ENDOSCOPIC ULTRASOUND, ESOPHAGOSCOPY, GASTROSCOPY, DUODENOSCOPY (EGD), FINE NEEDLE ASPIRATE/BIOPSY;  Surgeon: Grabiel Plata MD;  [facility]     ESOPHAGOSCOPY, GASTROSCOPY, DUODENOSCOPY (EGD), COMBINED N/A 6/8/2016    Procedure: COMBINED ESOPHAGOSCOPY, GASTROSCOPY, DUODENOSCOPY (EGD), REMOVE FOREIGN BODY;  Surgeon: Yovanny Beasley MD;  [facility]     ESOPHAGOSCOPY, GASTROSCOPY, DUODENOSCOPY (EGD), COMBINED N/A 4/17/2018    Procedure: COMBINED ESOPHAGOSCOPY, GASTROSCOPY, DUODENOSCOPY (EGD), REMOVE FOREIGN BODY;  EGD with stent removal;  Surgeon: Grabiel Plata MD;  [facility]     EXCISE LESION INTRAORAL  6/14/2012    Procedure: EXCISE LESION INTRAORAL;  Wide Local Excision Floor of Mouth, Direct Laryngoscopy, Bilateral Ida's Marsuplization, Split Thickness Skin Graft from right Thigh  Latex Safe;  Surgeon: Gerson Ravi MD;  [facility]     EXCISE LESION INTRAORAL  8/2/2012    Procedure: EXCISE LESION INTRAORAL;  Floor of Mouth Resection, Bilateral Selective Radical Neck Dissection, Tracheostomy, Left Radial Forearm  Free Flap with Alloderm, Nasogastric Feeding Tube Placement,    * Latex Safe*;  Surgeon: Gerson Ravi MD;  [facility]     EXCISE LESION INTRAORAL  12/11/2012    Procedure: EXCISE LESION INTRAORAL;  takedown of oral flap;  Surgeon: Yung Alvares MD;  [facility]     GRAFT FREE VASCULARIZED (LOCATION)  8/2/2012     Procedure: GRAFT FREE VASCULARIZED (LOCATION);;  Surgeon: Yung Alvares MD;  [facility]     GRAFT SKIN SPLIT THICKNESS FROM EXTREMITY  2012    Procedure: GRAFT SKIN SPLIT THICKNESS FROM EXTREMITY;;  Surgeon: Gerson Ravi MD;  [facility]     LAPAROSCOPIC ILEOSTOMY TAKEDOWN  2013    Procedure: LAPAROSCOPIC ILEOSTOMY TAKEDOWN;  Laparoscopic Closure of Enterostomy, Guerda's Type with IleoRectal Anastomosis ;  Surgeon: Grabiel Riddle MD;  [facility]     LAPAROTOMY EXPLORATORY  2012    Procedure: LAPAROTOMY EXPLORATORY;  Exploratory Laparotomy, total abdominal colectomy, ileostomy formation;  Surgeon: Miquel Cannon MD;  [facility]     LARYNGOSCOPY  2012    Procedure: LARYNGOSCOPY;;  Surgeon: Gerson Ravi MD;  [facility]     ORTHOPEDIC SURGERY      ganglian cyst left ankle     PANCREATECTOMY, SPLENECTOMY N/A 3/10/2016    Procedure: PANCREATECTOMY, SPLENECTOMY;  Surgeon: Nael Abel MD;  [facility]     SHOULDER SURGERY  ,     2006- right rotator cuff,  bone spur on left. Dr. Hdez     VARICOCELECTOMY Left 2017    Procedure: VARICOCELECTOMY;  Left Varicocele Repair, Denervation of Left Testis;  Surgeon: Marcio Aggarwal MD;  [facility]       Family History:    Family History   Problem Relation Age of Onset     Diabetes Sister         onset age 50     Alzheimer Disease Mother          80     Alzheimer Disease Father          85     Diabetes Other         nephew type 1     Diabetes Other      Aneurysm Sister      Anesthesia Reaction No family hx of      Colon Cancer No family hx of      Colon Polyps No family hx of      Crohn's Disease No family hx of      Ulcerative Colitis No family hx of        Social History:  Marital Status:   [2]  Social History     Tobacco Use     Smoking status: Former Smoker     Packs/day: 1.00     Years: 40.00     Pack years: 40.00     Types: Cigarettes     Last attempt to quit:  "2006     Years since quittin.7     Smokeless tobacco: Never Used   Substance Use Topics     Alcohol use: Not Currently     Comment: \"1 beer on Thanksgiving day\"     Drug use: Yes     Comment: 3 beers daily        Medications:    ASPIRIN NOT PRESCRIBED (INTENTIONAL)  Continuous Blood Gluc  (FREESTYLE LISA 14 DAY READER) JOSE ALBERTO  Continuous Blood Gluc Sensor (FREESTYLE LISA 14 DAY SENSOR) MISC  insulin isophane human (NOVOLIN N FLEXPEN RELION) 100 UNIT/ML injection  insulin pen needle (BD LUIS U/F) 32G X 4 MM miscellaneous  lisinopril (PRINIVIL/ZESTRIL) 5 MG tablet  metoprolol succinate ER (TOPROL-XL) 50 MG 24 hr tablet  multivitamin, therapeutic with minerals (THERA-VIT-M) TABS  ondansetron (ZOFRAN ODT) 4 MG ODT tab  ondansetron (ZOFRAN ODT) 4 MG ODT tab  oxyCODONE (ROXICODONE) 5 MG tablet  oxyCODONE (ROXICODONE) 5 MG tablet  rosuvastatin (CRESTOR) 10 MG tablet  tamsulosin (FLOMAX) 0.4 MG capsule  vitamin B-12 (CYANOCOBALAMIN) 100 MCG tablet  warfarin ANTICOAGULANT (COUMADIN) 2.5 MG tablet          Review of Systems   Constitutional: Negative.  Negative for fever.   Respiratory: Negative.    Gastrointestinal: Positive for abdominal pain, nausea and vomiting.   Genitourinary: Negative.    Musculoskeletal: Negative.    Skin: Negative.    All other systems reviewed and are negative.      Physical Exam   BP: (!) 206/84  Pulse: 85  Temp: 98.2  F (36.8  C)  Resp: 16  Weight: 90.7 kg (200 lb)  SpO2: 94 %      Physical Exam  Constitutional:       General: He is in acute distress.      Appearance: He is well-developed. He is not ill-appearing, toxic-appearing or diaphoretic.   HENT:      Head: Normocephalic and atraumatic.      Nose: Nose normal.      Mouth/Throat:      Lips: Pink.      Mouth: Mucous membranes are moist.   Eyes:      Conjunctiva/sclera: Conjunctivae normal.      Pupils: Pupils are equal, round, and reactive to light.   Neck:      Musculoskeletal: Normal range of motion and neck supple. No " neck rigidity.   Cardiovascular:      Rate and Rhythm: Normal rate and regular rhythm.      Heart sounds: Normal heart sounds.   Pulmonary:      Effort: Pulmonary effort is normal.      Breath sounds: Normal breath sounds.   Abdominal:      General: There is no distension.      Palpations: Abdomen is soft.      Tenderness: There is abdominal tenderness in the epigastric area. There is no guarding or rebound.   Musculoskeletal: Normal range of motion.   Lymphadenopathy:      Cervical: No cervical adenopathy.   Skin:     General: Skin is warm and dry.   Neurological:      Mental Status: He is alert and oriented to person, place, and time.   Psychiatric:         Mood and Affect: Mood normal.         ED Course        Procedures               EKG Interpretation:      Interpreted by Luisa Mendez PA-C  Symptoms at time of EKG: Epigastric pain   Rhythm: Normal sinus   Rate: Normal  Axis: Normal  Ectopy: None  Conduction: 1st degree AV block  ST Segments/ T Waves: No acute ischemic changes  Q Waves: None  Comparison to prior: Unchanged from 10/21/2018    Clinical Impression: No acute ischemic changes      Results for orders placed or performed during the hospital encounter of 08/04/20 (from the past 24 hour(s))   CBC with platelets differential   Result Value Ref Range    WBC 13.5 (H) 4.0 - 11.0 10e9/L    RBC Count 4.75 4.4 - 5.9 10e12/L    Hemoglobin 14.6 13.3 - 17.7 g/dL    Hematocrit 45.7 40.0 - 53.0 %    MCV 96 78 - 100 fl    MCH 30.7 26.5 - 33.0 pg    MCHC 31.9 31.5 - 36.5 g/dL    RDW 18.3 (H) 10.0 - 15.0 %    Platelet Count 300 150 - 450 10e9/L    Diff Method Manual Differential     % Neutrophils 75.0 %    % Lymphocytes 13.0 %    % Monocytes 8.0 %    % Eosinophils 2.0 %    % Basophils 0.0 %    % Myelocytes 2.0 %    Absolute Neutrophil 10.1 (H) 1.6 - 8.3 10e9/L    Absolute Lymphocytes 1.8 0.8 - 5.3 10e9/L    Absolute Monocytes 1.1 0.0 - 1.3 10e9/L    Absolute Eosinophils 0.3 0.0 - 0.7 10e9/L    Absolute Basophils  0.0 0.0 - 0.2 10e9/L    Absolute Myelocytes 0.3 (H) 0 10e9/L    Anisocytosis Moderate     Reactive Lymphs Present     Platelet Estimate       Automated count confirmed.  Giant platelets are present.   Comprehensive metabolic panel   Result Value Ref Range    Sodium 136 133 - 144 mmol/L    Potassium 5.9 (H) 3.4 - 5.3 mmol/L    Chloride 106 94 - 109 mmol/L    Carbon Dioxide 28 20 - 32 mmol/L    Anion Gap 2 (L) 3 - 14 mmol/L    Glucose 186 (H) 70 - 99 mg/dL    Urea Nitrogen 16 7 - 30 mg/dL    Creatinine 0.74 0.66 - 1.25 mg/dL    GFR Estimate >90 >60 mL/min/[1.73_m2]    GFR Estimate If Black >90 >60 mL/min/[1.73_m2]    Calcium 9.3 8.5 - 10.1 mg/dL    Bilirubin Total 0.6 0.2 - 1.3 mg/dL    Albumin 3.4 3.4 - 5.0 g/dL    Protein Total 8.3 6.8 - 8.8 g/dL    Alkaline Phosphatase 62 40 - 150 U/L    ALT 27 0 - 70 U/L    AST 39 0 - 45 U/L   Lipase   Result Value Ref Range    Lipase 1,317 (H) 73 - 393 U/L   INR   Result Value Ref Range    INR 4.23 (H) 0.86 - 1.14   Abd/pelvis CT, IV contrast only TRAUMA  / AAA    Narrative    CT ABDOMEN AND PELVIS WITH CONTRAST  8/4/2020 11:03 AM    CLINICAL HISTORY:  Epigastric and left upper quadrant abdominal pain,  vomiting, elevated lipase, history of pancreatitis.    TECHNIQUE: CT scan of the abdomen and pelvis was performed following  injection of IV contrast. Multiplanar reformats were obtained. Dose  reduction techniques were used.  CONTRAST: 97 mL Isovue-370    COMPARISON: 6/14/2020 and 5/22/2019.    FINDINGS:   LOWER CHEST: Calcified granuloma is noted in left lower lobe.    HEPATOBILIARY: Normal.    PANCREAS: Surgical clips are seen in the mid pancreas consistent with  distal pancreatectomy. Inflammatory change and edema surrounding the  remaining pancreas have nearly completely resolved. No pancreatic  ductal dilation or mass. There appears to be a variant of pancreatic  divisum with the minor papilla draining a portion of the ventral  pancreas and the major papilla draining the  majority of the ventral  and dorsal pancreas.    SPLEEN: The spleen is absent.    ADRENAL GLANDS: Normal.    KIDNEYS/BLADDER: Both kidneys show multiple areas of scarring and  multiple small cortical cysts. No follow-up necessary. No evidence of  hydronephrosis or enhancing mass through either kidney. The bladder is  unremarkable.    BOWEL: Diffuse gastric wall thickening is similar to the previous  exam. Small amount of fluid surrounds the entire stomach, also similar  to the previous study. No evidence for free air to suggest  perforation. The small bowel is normal in appearance and caliber. No  evidence for obstruction. Subtotal colectomy is noted with ileocolic  anastomosis at the distal sigmoid.    PELVIC ORGANS: Normal.    ADDITIONAL FINDINGS: Extensive atherosclerotic calcification is seen  through the normal caliber aorta and iliac vessels. No evidence for  adenopathy through the abdomen and pelvis.    MUSCULOSKELETAL: Degenerative changes are noted through the lumbar  spine.      Impression    IMPRESSION:   1.  Near complete resolution of edema and inflammatory change  surrounding the pancreas. Stable appearance of distal pancreatectomy.  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  2.  Stable diffuse gastric wall thickening with small amount of fluid  surrounding the stomach also similar to the previous exam. No evidence  of free air to suggest perforation.  3.  Otherwise, no evidence for fluid collection or inflammatory change  through the abdomen and pelvis.  4.  Subtotal colectomy with ileocolic anastomosis at the distal  sigmoid.   Basic metabolic panel   Result Value Ref Range    Sodium 137 133 - 144 mmol/L    Potassium 4.9 3.4 - 5.3 mmol/L    Chloride 105 94 - 109 mmol/L    Carbon Dioxide 29 20 - 32 mmol/L    Anion Gap 3 3 - 14 mmol/L    Glucose 161 (H) 70 - 99 mg/dL    Urea Nitrogen 15 7 - 30 mg/dL    Creatinine 0.68 0.66 -  "1.25 mg/dL    GFR Estimate >90 >60 mL/min/[1.73_m2]    GFR Estimate If Black >90 >60 mL/min/[1.73_m2]    Calcium 8.9 8.5 - 10.1 mg/dL       Medications   famotidine (PEPCID) infusion 20 mg (0 mg Intravenous Stopped 8/4/20 1219)   ondansetron (ZOFRAN) injection 4 mg (4 mg Intravenous Given 8/4/20 1254)   ondansetron (ZOFRAN) injection 4 mg (4 mg Intravenous Given 8/4/20 0959)   lactated ringers BOLUS 1,000 mL (0 mLs Intravenous Stopped 8/4/20 1146)   HYDROmorphone (PF) (DILAUDID) injection 0.5 mg (0.5 mg Intravenous Given 8/4/20 1045)   iopamidol (ISOVUE-370) solution 97 mL (97 mLs Intravenous Given 8/4/20 1053)   sodium chloride 0.9 % bag 500mL for CT scan flush use (64 mLs Intravenous Given 8/4/20 1053)   lactated ringers BOLUS 1,000 mL (0 mLs Intravenous Stopped 8/4/20 1255)   prochlorperazine (COMPAZINE) injection 5 mg (5 mg Intravenous Given 8/4/20 1518)       Assessments & Plan (with Medical Decision Making)     Pt  is a 74 year old male with history of pancreatitis, alcohol dependence, chronic atrial fibrillation (on Coumadin), type 2 diabetes mellitus, coronary artery disease, hypertension who presents with complaints of epigastric and left upper quadrant abdominal pain since last night.  Patient also complains of associated nausea and vomiting.  Patient has history of pancreatitis and states these symptoms feel the same.  He continues to drink \"a couple beers\" daily.  Last beer was yesterday.  Patient did not take any of his daily medications this morning due to the vomiting.  Patient denies any hematemesis.  He denies any changes in bowel habits.  Denies fevers, chills, cough, diarrhea, constipation, chest pain, shortness of breath, or urinary symptoms.  Patient reports having history of a bowel resection due to C. difficile colitis.    Pt is afebrile on arrival.  Exam as above.  Vital signs are stable.  Patient is actively throwing up on arrival.  IV was placed and labs drawn.  Zofran and IV fluids given.  " No change in EKG.  Patient is noted to have a white count of 13,500.  Initial potassium is elevated at 5.9 with a normal creatinine.  This was repeated after 2 L of fluid and potassium has normalized.  Lipase is elevated to 1,317.  INR is supratherapeutic at 4.23.  He has no active bleeding.  CT scan of the abdomen and pelvis with contrast shows near complete resolution of previously noted edema inflammatory changes surrounding the pancreas as noted on abdominal CT about a month and half ago.  Stable appearance of distal pancreatectomy.  Variant pancreatic divisum, is present with the minor papilla draining portion of the ventral pancreas.  There is also stable diffuse gastric wall thickening with small amount of surrounding fluid noted similarly on previous exam.  No free air to suggest perforation.  CT was otherwise negative.  Patient continues to experience vomiting despite receiving 2 doses of Zofran and IV fluids.  He was therefore given Compazine with some improvement.  He states his abdominal pain has improved with a dose of Dilaudid.  We will plan on admitting patient to the hospital for further evaluation and management of symptoms.  I discussed patient's case with on-call GI specialist through [facility], Dr. Pond, who believes that we may continue managing patient at [facility] and treat him conservatively here.  They will remain available for consultation as needed.  According to patient's most recent GI visit in July 2019, it was reported that if patient were to experience another episode of pancreatitis, he may be hospitalized at [facility] and once resolved, he would need a repeat secretin MRCP to assess for recurrent leak.    Discussed pt's case with hospitalist on-call, Dr. Guo, who accepts the admission and care of the patient.  Pt was admitted in stable condition.    Discussed pt's case with the ED physician on staff (Dr. Anaya).  He also evaluated patient and was involved in the care of the  patient throughout his ED visit including assessment, diagnosis, treatment, and plan of the patient.    I have reviewed the nursing notes.    I have reviewed the findings, diagnosis, planwith the patient.       ED to Inpatient Handoff:    Discussed with Dr Guo  Patient accepted for Inpatient Stay  Pending studies include None  Code Status: Full Code           New Prescriptions    No medications on file       Final diagnoses:   Acute pancreatitis       8/4/2020   [facility] EMERGENCY DEPARTMENT      Disclaimer:  This note consists of symbols derived from keyboarding, dictation and/or voice recognition software.  As a result, there may be errors in the script that have gone undetected.  Please consider this when interpreting information found in this chart.     Luisa Mendez PA-C  08/04/20 1704       Luisa Mendez PA-C  08/04/20 1706       Luisa Mendez PA-C  08/04/20 1706       Luisa Mendez PA-C  08/04/20 1707

## 2020-08-05 ENCOUNTER — DOCUMENTATION ONLY (OUTPATIENT)
Dept: LAB | Facility: CLINIC | Age: 75
End: 2020-08-05

## 2020-08-05 DIAGNOSIS — E11.42 TYPE 2 DIABETES MELLITUS WITH DIABETIC POLYNEUROPATHY, WITHOUT LONG-TERM CURRENT USE OF INSULIN (H): Primary | Chronic | ICD-10-CM

## 2020-08-05 LAB
ALBUMIN SERPL-MCNC: 2.8 G/DL (ref 3.4–5)
ALP SERPL-CCNC: 49 U/L (ref 40–150)
ALT SERPL W P-5'-P-CCNC: 19 U/L (ref 0–70)
ANION GAP SERPL CALCULATED.3IONS-SCNC: 2 MMOL/L (ref 3–14)
AST SERPL W P-5'-P-CCNC: 24 U/L (ref 0–45)
BILIRUB SERPL-MCNC: 0.8 MG/DL (ref 0.2–1.3)
BUN SERPL-MCNC: 13 MG/DL (ref 7–30)
CALCIUM SERPL-MCNC: 8.6 MG/DL (ref 8.5–10.1)
CHLORIDE SERPL-SCNC: 107 MMOL/L (ref 94–109)
CO2 SERPL-SCNC: 30 MMOL/L (ref 20–32)
CREAT SERPL-MCNC: 0.84 MG/DL (ref 0.66–1.25)
ERYTHROCYTE [DISTWIDTH] IN BLOOD BY AUTOMATED COUNT: 18.5 % (ref 10–15)
GFR SERPL CREATININE-BSD FRML MDRD: 87 ML/MIN/{1.73_M2}
GLUCOSE SERPL-MCNC: 105 MG/DL (ref 70–99)
HCT VFR BLD AUTO: 40.6 % (ref 40–53)
HGB BLD-MCNC: 12.8 G/DL (ref 13.3–17.7)
INR PPP: 3.73 (ref 0.86–1.14)
LIPASE SERPL-CCNC: 700 U/L (ref 73–393)
MAGNESIUM SERPL-MCNC: 1.5 MG/DL (ref 1.6–2.3)
MAGNESIUM SERPL-MCNC: 2.7 MG/DL (ref 1.6–2.3)
MCH RBC QN AUTO: 30.5 PG (ref 26.5–33)
MCHC RBC AUTO-ENTMCNC: 31.5 G/DL (ref 31.5–36.5)
MCV RBC AUTO: 97 FL (ref 78–100)
PLATELET # BLD AUTO: 249 10E9/L (ref 150–450)
POTASSIUM SERPL-SCNC: 4.3 MMOL/L (ref 3.4–5.3)
PROT SERPL-MCNC: 6.8 G/DL (ref 6.8–8.8)
RBC # BLD AUTO: 4.2 10E12/L (ref 4.4–5.9)
SARS-COV-2 RNA SPEC QL NAA+PROBE: NOT DETECTED
SODIUM SERPL-SCNC: 139 MMOL/L (ref 133–144)
SPECIMEN SOURCE: NORMAL
WBC # BLD AUTO: 10.6 10E9/L (ref 4–11)

## 2020-08-05 PROCEDURE — 83690 ASSAY OF LIPASE: CPT | Performed by: FAMILY MEDICINE

## 2020-08-05 PROCEDURE — 25000128 H RX IP 250 OP 636: Performed by: FAMILY MEDICINE

## 2020-08-05 PROCEDURE — 36415 COLL VENOUS BLD VENIPUNCTURE: CPT | Performed by: REGISTERED NURSE

## 2020-08-05 PROCEDURE — 25800030 ZZH RX IP 258 OP 636: Performed by: FAMILY MEDICINE

## 2020-08-05 PROCEDURE — 25000132 ZZH RX MED GY IP 250 OP 250 PS 637: Performed by: FAMILY MEDICINE

## 2020-08-05 PROCEDURE — 25000131 ZZH RX MED GY IP 250 OP 636 PS 637: Performed by: FAMILY MEDICINE

## 2020-08-05 PROCEDURE — 25000125 ZZHC RX 250: Performed by: FAMILY MEDICINE

## 2020-08-05 PROCEDURE — 25800030 ZZH RX IP 258 OP 636: Performed by: PHYSICIAN ASSISTANT

## 2020-08-05 PROCEDURE — 85610 PROTHROMBIN TIME: CPT | Performed by: FAMILY MEDICINE

## 2020-08-05 PROCEDURE — 85027 COMPLETE CBC AUTOMATED: CPT | Performed by: FAMILY MEDICINE

## 2020-08-05 PROCEDURE — 25000128 H RX IP 250 OP 636: Performed by: PHYSICIAN ASSISTANT

## 2020-08-05 PROCEDURE — 12000000 ZZH R&B MED SURG/OB

## 2020-08-05 PROCEDURE — 80053 COMPREHEN METABOLIC PANEL: CPT | Performed by: FAMILY MEDICINE

## 2020-08-05 PROCEDURE — 83735 ASSAY OF MAGNESIUM: CPT | Performed by: REGISTERED NURSE

## 2020-08-05 PROCEDURE — 99232 SBSQ HOSP IP/OBS MODERATE 35: CPT | Performed by: INTERNAL MEDICINE

## 2020-08-05 PROCEDURE — 36415 COLL VENOUS BLD VENIPUNCTURE: CPT | Performed by: FAMILY MEDICINE

## 2020-08-05 RX ADMIN — DEXTROSE AND SODIUM CHLORIDE: 5; 900 INJECTION, SOLUTION INTRAVENOUS at 11:52

## 2020-08-05 RX ADMIN — MAGNESIUM SULFATE IN WATER 4 G: 40 INJECTION, SOLUTION INTRAVENOUS at 16:53

## 2020-08-05 RX ADMIN — MULTIPLE VITAMINS W/ MINERALS TAB 1 TABLET: TAB at 09:39

## 2020-08-05 RX ADMIN — METOPROLOL SUCCINATE 75 MG: 25 TABLET, EXTENDED RELEASE ORAL at 21:55

## 2020-08-05 RX ADMIN — VITAM B12 100 MCG: 100 TAB at 09:41

## 2020-08-05 RX ADMIN — SODIUM CHLORIDE, POTASSIUM CHLORIDE, SODIUM LACTATE AND CALCIUM CHLORIDE: 600; 310; 30; 20 INJECTION, SOLUTION INTRAVENOUS at 05:08

## 2020-08-05 RX ADMIN — INSULIN ASPART 1 UNITS: 100 INJECTION, SOLUTION INTRAVENOUS; SUBCUTANEOUS at 15:15

## 2020-08-05 RX ADMIN — HYDROMORPHONE HYDROCHLORIDE 0.5 MG: 1 INJECTION, SOLUTION INTRAMUSCULAR; INTRAVENOUS; SUBCUTANEOUS at 15:48

## 2020-08-05 RX ADMIN — INSULIN HUMAN 32 UNITS: 100 INJECTION, SUSPENSION SUBCUTANEOUS at 17:55

## 2020-08-05 RX ADMIN — HYDROMORPHONE HYDROCHLORIDE 0.5 MG: 1 INJECTION, SOLUTION INTRAMUSCULAR; INTRAVENOUS; SUBCUTANEOUS at 08:45

## 2020-08-05 RX ADMIN — PROCHLORPERAZINE EDISYLATE 5 MG: 5 INJECTION INTRAMUSCULAR; INTRAVENOUS at 08:44

## 2020-08-05 RX ADMIN — DEXTROSE AND SODIUM CHLORIDE: 5; 900 INJECTION, SOLUTION INTRAVENOUS at 06:55

## 2020-08-05 RX ADMIN — PROCHLORPERAZINE EDISYLATE 5 MG: 5 INJECTION INTRAMUSCULAR; INTRAVENOUS at 15:44

## 2020-08-05 RX ADMIN — TAMSULOSIN HYDROCHLORIDE 0.4 MG: 0.4 CAPSULE ORAL at 09:39

## 2020-08-05 RX ADMIN — FOLIC ACID: 5 INJECTION, SOLUTION INTRAMUSCULAR; INTRAVENOUS; SUBCUTANEOUS at 16:55

## 2020-08-05 RX ADMIN — METOPROLOL SUCCINATE 75 MG: 25 TABLET, EXTENDED RELEASE ORAL at 09:39

## 2020-08-05 RX ADMIN — ROSUVASTATIN CALCIUM 10 MG: 5 TABLET, FILM COATED ORAL at 09:39

## 2020-08-05 RX ADMIN — ONDANSETRON 4 MG: 2 INJECTION INTRAMUSCULAR; INTRAVENOUS at 11:51

## 2020-08-05 RX ADMIN — HYDROMORPHONE HYDROCHLORIDE 0.5 MG: 1 INJECTION, SOLUTION INTRAMUSCULAR; INTRAVENOUS; SUBCUTANEOUS at 11:46

## 2020-08-05 RX ADMIN — LISINOPRIL 5 MG: 5 TABLET ORAL at 09:39

## 2020-08-05 RX ADMIN — HYDROMORPHONE HYDROCHLORIDE 0.5 MG: 1 INJECTION, SOLUTION INTRAMUSCULAR; INTRAVENOUS; SUBCUTANEOUS at 23:40

## 2020-08-05 ASSESSMENT — MIFFLIN-ST. JEOR: SCORE: 1689.25

## 2020-08-05 ASSESSMENT — ACTIVITIES OF DAILY LIVING (ADL)
ADLS_ACUITY_SCORE: 11

## 2020-08-05 NOTE — PROGRESS NOTES
Patient is alert and orientated. Up SBA, steady gait. Had a emesis episode on evenings. Received Zofran and Compazine this shift. C/o LUQ abdominal pain that radiates to epigastric quadrant, 0.5 Dilaudid administered and fully effective. Pt has been NPO . VSS, afebrile and on RA 93%. Dr. Guo approved pt is able to use own glucose monitor, glucose monitored every 4 hrs. BG 90 at 0630 spoke w/Sheila DEMPSEY, and switched fluids to D5 NS, hold Humulin and recheck bg at 0800 to see if safe parameters are met at that time. CIWA's every 4 hrs. Scores were 4, 0, 0.

## 2020-08-05 NOTE — PLAN OF CARE
Continues to have intermittent nausea and abdominal pain today, iv dilaudid given about every 3 hours, compazine and zofran for nausea.  Remains NPO.  Voiding fine.  Up independently in his room.  Blood sugars 164 and 178 today.

## 2020-08-05 NOTE — PROGRESS NOTES
Patient's blood glucose is currently 158 according to own monitor.    Patient states he will not take the sliding scale fast acting novolog dose since he is not eating.  Pt did allow to be given his  long acting dose 32Units Humulin N dose as ordered.

## 2020-08-06 LAB
ALBUMIN SERPL-MCNC: 2.6 G/DL (ref 3.4–5)
ALP SERPL-CCNC: 53 U/L (ref 40–150)
ALT SERPL W P-5'-P-CCNC: 18 U/L (ref 0–70)
ANION GAP SERPL CALCULATED.3IONS-SCNC: 4 MMOL/L (ref 3–14)
AST SERPL W P-5'-P-CCNC: 29 U/L (ref 0–45)
BILIRUB DIRECT SERPL-MCNC: 0.2 MG/DL (ref 0–0.2)
BILIRUB SERPL-MCNC: 1.1 MG/DL (ref 0.2–1.3)
BUN SERPL-MCNC: 9 MG/DL (ref 7–30)
CALCIUM SERPL-MCNC: 7.9 MG/DL (ref 8.5–10.1)
CHLORIDE SERPL-SCNC: 107 MMOL/L (ref 94–109)
CO2 SERPL-SCNC: 26 MMOL/L (ref 20–32)
CREAT SERPL-MCNC: 0.78 MG/DL (ref 0.66–1.25)
ERYTHROCYTE [DISTWIDTH] IN BLOOD BY AUTOMATED COUNT: 18.6 % (ref 10–15)
GFR SERPL CREATININE-BSD FRML MDRD: 90 ML/MIN/{1.73_M2}
GLUCOSE SERPL-MCNC: 150 MG/DL (ref 70–99)
HCT VFR BLD AUTO: 36.6 % (ref 40–53)
HGB BLD-MCNC: 11.5 G/DL (ref 13.3–17.7)
INR PPP: 2.89 (ref 0.86–1.14)
LIPASE SERPL-CCNC: 464 U/L (ref 73–393)
MAGNESIUM SERPL-MCNC: 2.3 MG/DL (ref 1.6–2.3)
MCH RBC QN AUTO: 30.9 PG (ref 26.5–33)
MCHC RBC AUTO-ENTMCNC: 31.4 G/DL (ref 31.5–36.5)
MCV RBC AUTO: 98 FL (ref 78–100)
PLATELET # BLD AUTO: 228 10E9/L (ref 150–450)
POTASSIUM SERPL-SCNC: 4.4 MMOL/L (ref 3.4–5.3)
PROT SERPL-MCNC: 6.4 G/DL (ref 6.8–8.8)
RBC # BLD AUTO: 3.72 10E12/L (ref 4.4–5.9)
SODIUM SERPL-SCNC: 137 MMOL/L (ref 133–144)
WBC # BLD AUTO: 12 10E9/L (ref 4–11)

## 2020-08-06 PROCEDURE — 12000000 ZZH R&B MED SURG/OB

## 2020-08-06 PROCEDURE — 85027 COMPLETE CBC AUTOMATED: CPT | Performed by: INTERNAL MEDICINE

## 2020-08-06 PROCEDURE — 83735 ASSAY OF MAGNESIUM: CPT | Performed by: FAMILY MEDICINE

## 2020-08-06 PROCEDURE — 99232 SBSQ HOSP IP/OBS MODERATE 35: CPT | Performed by: INTERNAL MEDICINE

## 2020-08-06 PROCEDURE — 25000125 ZZHC RX 250: Performed by: FAMILY MEDICINE

## 2020-08-06 PROCEDURE — 36415 COLL VENOUS BLD VENIPUNCTURE: CPT | Performed by: FAMILY MEDICINE

## 2020-08-06 PROCEDURE — 36415 COLL VENOUS BLD VENIPUNCTURE: CPT | Performed by: INTERNAL MEDICINE

## 2020-08-06 PROCEDURE — 83690 ASSAY OF LIPASE: CPT | Performed by: FAMILY MEDICINE

## 2020-08-06 PROCEDURE — 25800030 ZZH RX IP 258 OP 636: Performed by: FAMILY MEDICINE

## 2020-08-06 PROCEDURE — 25000132 ZZH RX MED GY IP 250 OP 250 PS 637

## 2020-08-06 PROCEDURE — 25000128 H RX IP 250 OP 636: Performed by: FAMILY MEDICINE

## 2020-08-06 PROCEDURE — 25000132 ZZH RX MED GY IP 250 OP 250 PS 637: Performed by: PHYSICIAN ASSISTANT

## 2020-08-06 PROCEDURE — 25000132 ZZH RX MED GY IP 250 OP 250 PS 637: Performed by: FAMILY MEDICINE

## 2020-08-06 PROCEDURE — 80076 HEPATIC FUNCTION PANEL: CPT | Performed by: FAMILY MEDICINE

## 2020-08-06 PROCEDURE — 25800030 ZZH RX IP 258 OP 636: Performed by: PHYSICIAN ASSISTANT

## 2020-08-06 PROCEDURE — 25800030 ZZH RX IP 258 OP 636: Performed by: INTERNAL MEDICINE

## 2020-08-06 PROCEDURE — 85610 PROTHROMBIN TIME: CPT | Performed by: FAMILY MEDICINE

## 2020-08-06 PROCEDURE — 80048 BASIC METABOLIC PNL TOTAL CA: CPT | Performed by: FAMILY MEDICINE

## 2020-08-06 RX ORDER — WARFARIN SODIUM 2.5 MG/1
2.5 TABLET ORAL
Status: COMPLETED | OUTPATIENT
Start: 2020-08-06 | End: 2020-08-06

## 2020-08-06 RX ADMIN — METOPROLOL SUCCINATE 75 MG: 25 TABLET, EXTENDED RELEASE ORAL at 09:59

## 2020-08-06 RX ADMIN — FOLIC ACID: 5 INJECTION, SOLUTION INTRAMUSCULAR; INTRAVENOUS; SUBCUTANEOUS at 17:47

## 2020-08-06 RX ADMIN — PROCHLORPERAZINE EDISYLATE 5 MG: 5 INJECTION INTRAMUSCULAR; INTRAVENOUS at 05:02

## 2020-08-06 RX ADMIN — HYDROMORPHONE HYDROCHLORIDE 0.5 MG: 1 INJECTION, SOLUTION INTRAMUSCULAR; INTRAVENOUS; SUBCUTANEOUS at 05:02

## 2020-08-06 RX ADMIN — METOPROLOL SUCCINATE 75 MG: 25 TABLET, EXTENDED RELEASE ORAL at 20:05

## 2020-08-06 RX ADMIN — VITAM B12 100 MCG: 100 TAB at 10:02

## 2020-08-06 RX ADMIN — MULTIPLE VITAMINS W/ MINERALS TAB 1 TABLET: TAB at 09:59

## 2020-08-06 RX ADMIN — DEXTROSE AND SODIUM CHLORIDE: 5; 900 INJECTION, SOLUTION INTRAVENOUS at 10:47

## 2020-08-06 RX ADMIN — ROSUVASTATIN CALCIUM 10 MG: 5 TABLET, FILM COATED ORAL at 10:00

## 2020-08-06 RX ADMIN — DEXTROSE AND SODIUM CHLORIDE: 5; 900 INJECTION, SOLUTION INTRAVENOUS at 05:42

## 2020-08-06 RX ADMIN — LISINOPRIL 5 MG: 5 TABLET ORAL at 10:00

## 2020-08-06 RX ADMIN — OXYCODONE HYDROCHLORIDE 5 MG: 5 TABLET ORAL at 23:51

## 2020-08-06 RX ADMIN — OXYCODONE HYDROCHLORIDE 5 MG: 5 TABLET ORAL at 13:42

## 2020-08-06 RX ADMIN — WARFARIN SODIUM 2.5 MG: 2.5 TABLET ORAL at 17:47

## 2020-08-06 RX ADMIN — SODIUM CHLORIDE, POTASSIUM CHLORIDE, SODIUM LACTATE AND CALCIUM CHLORIDE: 600; 310; 30; 20 INJECTION, SOLUTION INTRAVENOUS at 17:41

## 2020-08-06 RX ADMIN — TAMSULOSIN HYDROCHLORIDE 0.4 MG: 0.4 CAPSULE ORAL at 09:59

## 2020-08-06 RX ADMIN — OXYCODONE HYDROCHLORIDE 5 MG: 5 TABLET ORAL at 17:47

## 2020-08-06 ASSESSMENT — ACTIVITIES OF DAILY LIVING (ADL)
ADLS_ACUITY_SCORE: 11

## 2020-08-06 ASSESSMENT — MIFFLIN-ST. JEOR: SCORE: 1680.25

## 2020-08-06 NOTE — PHARMACY-ANTICOAGULATION SERVICE
Clinical Pharmacy - Warfarin Dosing Consult     Pharmacy has been consulted to manage this patient s warfarin therapy.  Indication: Atrial Fibrillation  Therapy Goal: INR 2-3  OP Anticoag Clinic: [facility]  Warfarin Prior to Admission: Yes  Warfarin PTA Regimen: 1.25 mg M and 2.5 mg ROW  Recent documented change in oral intake/nutrition: Yes(advancing from NPO)  Dose Comments: 2.5 mg today for INR 2.89    INR   Date Value Ref Range Status   08/06/2020 2.89 (H) 0.86 - 1.14 Final   08/05/2020 3.73 (H) 0.86 - 1.14 Final       Recommend warfarin 2.5 mg today to restart patient's home dose now that INR is at goal.  Pharmacy will monitor Antoine Russo daily and order warfarin doses to achieve specified goal.      Please contact pharmacy as soon as possible if the warfarin needs to be held for a procedure or if the warfarin goals change.      Rachel Joshi, PharmD Student

## 2020-08-06 NOTE — PLAN OF CARE
Pt having some abd pain near midnight.  Pain med given with relief.  Pt up ind in room.  Can let needs be known.  0200 Blood sugar was 85.  I asked the pt is he was comfortable with that number and he said yes.  JESSICA Moss RN   [___] : Living: [unfilled] [Pregnancy History] : boy

## 2020-08-06 NOTE — PLAN OF CARE
Patient reported nausea and pain improved this shift - declined pain medication.  Magnesium 1.5 today. Replacement given per protocol.

## 2020-08-06 NOTE — PROGRESS NOTES
[facility]    Medicine Progress Note - Hospitalist Service       Date of Admission:  8/4/2020  Assessment & Plan   Antoine Russo is a 74 year old male with recurrent pancreatitis, BPH, CAD, history of mouth cancer, diabetes-2,  Chronic low back pain, EtOH abuse, admitted on 8/4/2020 for acute pancreatitis.     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  He had a pancreatic duct leak following this.  He had a pancreatic biliary sphincterotomy and aspiration of the pseudocyst in March 2019 that was followed with some episodes of severe pancreatitis.   - He was last seen by Dr. Beasley in July 2019 at that time he felt if he would have another episode of pancreatitis he could be hospitalized here at [facility] and once it is resolved he would need a repeat secretin MRCP to assess for recurrent leak.  - The ER provider did speak with on-call hepatology on admission and they requested that we admit the patient and treat him conservatively here.  - He does continue to drink some alcohol.  He was seen as an outpatient on 6/14/2020 for pancreatitis.  He was not admitted at that time.  - tolerating clears AM 8/6/2020, advance to regular PM  - decrease IVF from 200/hr to 100/hr  - use PO opiates primarily     Hypertension with current elevated blood pressure readings  Continue metoprolol and lisinopril.   - Use labetalol IV as needed elevated high blood pressure     Gastric wall thickening noted on CT scan  Patient might need an EGD   - He will need referral back to hepatic GI after this hospitalization.     Leukocytosis  Admit white blood cell  count 13.5.   - Likely SIRS related-no overt infection found.   - Patient remain afebrile     Paroxysmal atrial fibrillation/chronic anticoagulation/over anticoagulation  Admit INR 4.23-pharmacy to manage.   - Currently in sinus rhythm.     Hyperlipidemia  Continue Crestor     BPH  Continue Flomax     Diabetes mellitus type 2  Recent HgbA1c 6.3   - Continue NPH insulin and use medium sliding scale   - Pt has a functioning continuous glucose sensor in place-ok to use this in place of at least some glucometer readings.     Alcohol abuse syndrome  CIWA order set, IV vitamins.  He reports fairly modest alcohol intake recently.     Coronary artery disease  Cath showed moderate coronary disease in 2009, no stents placed     Previous Clostridium difficile enterocolitis  Previous subtotal colectomy and eventual re anastomosis  Apparently had a previous partial colectomy due to complications from Clostridium difficile.  He later had a re-anastomosis following ileostomy.     Recent lumbar discectomy  Done this last year at [facility] absent  This was removed at the time of his partial pancreatectomy.     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  Entered: Citlalli Mejias DO 08/06/2020, 3:20 PM       The patient's care was discussed with the Patient.    Citlalli Mejias DO  Hospitalist Service  [facility]    ______________________________________________________________________    Interval History   --he reports feeling significantly better than yesterday.  He was able to tolerate clears this morning.  He has not needed his many doses of pain medications.  He  denies any nausea.  --He is up ambulating in his room    Data reviewed today: I reviewed all medications, new labs and imaging results over the last 24 hours. I personally reviewed no images or EKG's today.    Physical Exam   Vital Signs: Temp: 97.9  F (36.6  C) Temp src: Oral BP: 118/54 Pulse: 70   Resp: 18 SpO2: 90 % O2 Device: None (Room air)    Weight: 203 lbs 11.28 oz    Gen: alert, in no distress, elderly  Eyes: conjunctiva clear.  Neck: supple, no lymphadenopathy   CV: irreg irreg, S1 and S2 normal, no murmurs. Radial and pedal pulses symmetric and normal  Respiratory: Lungs clear bilaterally  Abd: Soft, non-tender.  Normal bowel sounds.    Lymph: No peripheral edema  MSK: moves all extremities equally.  Skin: no significant rashes.      Data   Recent Labs   Lab 08/06/20  0855 08/06/20  0747 08/05/20  0429 08/04/20  1255 08/04/20  0957   WBC 12.0*  --  10.6  --  13.5*   HGB 11.5*  --  12.8*  --  14.6   MCV 98  --  97  --  96     --  249  --  300   INR  --  2.89* 3.73*  --  4.23*   NA  --  137 139 137 136   POTASSIUM  --  4.4 4.3 4.9 5.9*   CHLORIDE  --  107 107 105 106   CO2  --  26 30 29 28   BUN  --  9 13 15 16   CR  --  0.78 0.84 0.68 0.74   ANIONGAP  --  4 2* 3 2*   NILSON  --  7.9* 8.6 8.9 9.3   GLC  --  150* 105* 161* 186*   ALBUMIN  --  2.6* 2.8*  --  3.4   PROTTOTAL  --  6.4* 6.8  --  8.3   BILITOTAL  --  1.1 0.8  --  0.6   ALKPHOS  --  53 49  --  62   ALT  --  18 19  --  27   AST  --  29 24  --  39   LIPASE  --  464* 700*  --  1,317*     No results found for this or any previous visit (from the past 24 hour(s)).  Medications     lactated ringers 200 mL/hr at 08/05/20 0508     - MEDICATION INSTRUCTIONS -       Warfarin Therapy Reminder         vitamin B-12  100 mcg Oral Daily     insulin aspart  1-7 Units Subcutaneous TID AC     insulin aspart  1-5 Units Subcutaneous At Bedtime     insulin isophane human  32 Units Subcutaneous QPM     insulin isophane human  34 Units Subcutaneous QAM AC      lisinopril  5 mg Oral Daily     metoprolol succinate ER  75 mg Oral BID     multivitamin w/minerals  1 tablet Oral Daily     rosuvastatin  10 mg Oral Daily     sodium chloride (PF)  3 mL Intracatheter Q8H     IV Fluid with vitamins   Intravenous Q24H     tamsulosin  0.4 mg Oral Daily     warfarin ANTICOAGULANT  2.5 mg Oral ONCE at 18:00

## 2020-08-06 NOTE — PLAN OF CARE
Patient has decreased abdominal pain and nausea today.  Lipase down.  Started on clear liquid diet and tolerated without problems.  Has been up and ambulating independently.  Advanced to regular diet for dinner.  Monitoring blood sugars with patient's own glucose sensor.  Reports having a bowel movement x 2 today.  Oxycodone 5mg given once for back pain.

## 2020-08-07 ENCOUNTER — DOCUMENTATION ONLY (OUTPATIENT)
Dept: FAMILY MEDICINE | Facility: CLINIC | Age: 75
End: 2020-08-07

## 2020-08-07 VITALS
OXYGEN SATURATION: 91 % | BODY MASS INDEX: 29.6 KG/M2 | HEART RATE: 95 BPM | HEIGHT: 70 IN | DIASTOLIC BLOOD PRESSURE: 61 MMHG | TEMPERATURE: 99.2 F | RESPIRATION RATE: 18 BRPM | WEIGHT: 206.79 LBS | SYSTOLIC BLOOD PRESSURE: 137 MMHG

## 2020-08-07 DIAGNOSIS — I48.20 CHRONIC ATRIAL FIBRILLATION (H): ICD-10-CM

## 2020-08-07 DIAGNOSIS — Z79.01 LONG TERM CURRENT USE OF ANTICOAGULANT THERAPY: ICD-10-CM

## 2020-08-07 LAB
ALBUMIN SERPL-MCNC: 2.6 G/DL (ref 3.4–5)
ALP SERPL-CCNC: 63 U/L (ref 40–150)
ALT SERPL W P-5'-P-CCNC: 21 U/L (ref 0–70)
ANION GAP SERPL CALCULATED.3IONS-SCNC: 4 MMOL/L (ref 3–14)
AST SERPL W P-5'-P-CCNC: 19 U/L (ref 0–45)
BILIRUB SERPL-MCNC: 1.3 MG/DL (ref 0.2–1.3)
BUN SERPL-MCNC: 10 MG/DL (ref 7–30)
CALCIUM SERPL-MCNC: 8.1 MG/DL (ref 8.5–10.1)
CHLORIDE SERPL-SCNC: 106 MMOL/L (ref 94–109)
CO2 SERPL-SCNC: 25 MMOL/L (ref 20–32)
CREAT SERPL-MCNC: 0.89 MG/DL (ref 0.66–1.25)
ERYTHROCYTE [DISTWIDTH] IN BLOOD BY AUTOMATED COUNT: 18.1 % (ref 10–15)
GFR SERPL CREATININE-BSD FRML MDRD: 85 ML/MIN/{1.73_M2}
GLUCOSE SERPL-MCNC: 104 MG/DL (ref 70–99)
HCT VFR BLD AUTO: 36.9 % (ref 40–53)
HGB BLD-MCNC: 11.6 G/DL (ref 13.3–17.7)
INR PPP: 2.15 (ref 0.86–1.14)
MCH RBC QN AUTO: 30.6 PG (ref 26.5–33)
MCHC RBC AUTO-ENTMCNC: 31.4 G/DL (ref 31.5–36.5)
MCV RBC AUTO: 97 FL (ref 78–100)
PLATELET # BLD AUTO: 223 10E9/L (ref 150–450)
POTASSIUM SERPL-SCNC: 4.1 MMOL/L (ref 3.4–5.3)
PROT SERPL-MCNC: 6.5 G/DL (ref 6.8–8.8)
RBC # BLD AUTO: 3.79 10E12/L (ref 4.4–5.9)
SODIUM SERPL-SCNC: 135 MMOL/L (ref 133–144)
WBC # BLD AUTO: 13.6 10E9/L (ref 4–11)

## 2020-08-07 PROCEDURE — 36415 COLL VENOUS BLD VENIPUNCTURE: CPT | Performed by: FAMILY MEDICINE

## 2020-08-07 PROCEDURE — 25000132 ZZH RX MED GY IP 250 OP 250 PS 637: Performed by: FAMILY MEDICINE

## 2020-08-07 PROCEDURE — 85610 PROTHROMBIN TIME: CPT | Performed by: FAMILY MEDICINE

## 2020-08-07 PROCEDURE — 80053 COMPREHEN METABOLIC PANEL: CPT | Performed by: FAMILY MEDICINE

## 2020-08-07 PROCEDURE — 85027 COMPLETE CBC AUTOMATED: CPT | Performed by: FAMILY MEDICINE

## 2020-08-07 PROCEDURE — 99238 HOSP IP/OBS DSCHRG MGMT 30/<: CPT | Performed by: INTERNAL MEDICINE

## 2020-08-07 RX ORDER — WARFARIN SODIUM 2.5 MG/1
TABLET ORAL
Qty: 85 TABLET | Refills: 0 | COMMUNITY
Start: 2020-08-07 | End: 2020-10-02

## 2020-08-07 RX ADMIN — ROSUVASTATIN CALCIUM 10 MG: 5 TABLET, FILM COATED ORAL at 08:02

## 2020-08-07 RX ADMIN — METOPROLOL SUCCINATE 75 MG: 25 TABLET, EXTENDED RELEASE ORAL at 08:01

## 2020-08-07 RX ADMIN — LISINOPRIL 5 MG: 5 TABLET ORAL at 08:01

## 2020-08-07 RX ADMIN — MULTIPLE VITAMINS W/ MINERALS TAB 1 TABLET: TAB at 08:02

## 2020-08-07 RX ADMIN — VITAM B12 100 MCG: 100 TAB at 07:57

## 2020-08-07 RX ADMIN — TAMSULOSIN HYDROCHLORIDE 0.4 MG: 0.4 CAPSULE ORAL at 08:04

## 2020-08-07 ASSESSMENT — ACTIVITIES OF DAILY LIVING (ADL)
ADLS_ACUITY_SCORE: 11

## 2020-08-07 ASSESSMENT — MIFFLIN-ST. JEOR: SCORE: 1694.25

## 2020-08-07 NOTE — PROGRESS NOTES
Blood sugar had gone up to 116 after sandwich but  Now is back to 60.  Pt doesn't want to eat right now, will try some milk and recheck it.  JESSICA Moss RN

## 2020-08-07 NOTE — PLAN OF CARE
Pt only complaining of back pain that was relieved with medication.  Up walking in halls independently.Able to eat and drink without nausea.  Blood sugar at 2100 was 70.  Pt asked to eat something and he ate part of a sandwich and some juice.  Next check at  0200.  JESSICA Moss RN

## 2020-08-07 NOTE — DISCHARGE INSTRUCTIONS
1. Follow-up with your pancreas doctor at the [place] in a few weeks  2. Follow-up with your primary care doctor in 1-2 weeks  3. Avoid alcohol    Warfarin  Take 3.75mg (1.5 tablets) at home today, 8/7/20. Then resume your normal dosing schedule starting tomorrow. Please get your INR checked sometime early next week, around 8/10-8/12, and INR clinic will follow up with you on further instructions.

## 2020-08-07 NOTE — PLAN OF CARE
[place] [facility] DISCHARGE NOTE    Patient discharged to home at 11:00 AM via ambulation. Accompanied by significant other and staff. Discharge instructions reviewed with patient, opportunity offered to ask questions. Prescriptions - None ordered for discharge. All belongings sent with patient.    Antoine Maciel RN

## 2020-08-07 NOTE — PROGRESS NOTES
Blood sugar was 52 before dinner per patients glucose sensor, patient was asypmtomatic.  Patient ate regular dinner, tolerated well, blood sugar following was 123.

## 2020-08-07 NOTE — CONSULTS
"Addendum:  SW received return voicemail from ERIN Ladd [employer], informing me that the pt has limited TCU or LTACH coverage.  Julee informed that she did speak with pt yesterday & that he declined any cares upon discharge & expects to discharge to home today.    Julee stated there was no reason to return her call, she was simply providing information to this writer.    SW placed call to pt & he declines the need for any discharge planning cares, stating he will return home with his wife.  She will transport upon discharge.    JAMMIE Díaz on 8/7/2020 at 10:03 AM    CARE TRANSITION SOCIAL WORK INITIAL ASSESSMENT:    Admit date/time:  INPT 08/04/20 1815     Referral received from care team for the purpose of, \"Received call from Julee Tellez with ERIN, a case mger for this pt and would like to speak to a  here due to limited coverage for cares.  Phone is [phone].\"      SW placed call to Julee & left message requesting return call.      JAY reviewed pt's EMR and learned that pt has BCBS & Medicare insurance.    Per IDT rounds, Care Transitions is not identified for pt's discharge needs at this time, however, will continue to monitor for such.     PLAN:  JAY awaiting return call from ERIN Ladd [facility].    JAMMIE Flores  [facility] [phone]   [facility]  [phone]              "

## 2020-08-07 NOTE — PHARMACY-ANTICOAGULATION SERVICE
Clinical Pharmacy- Warfarin Discharge Note  This patient is currently on warfarin for the treatment of Atrial fibrillation.  INR Goal= 2-3  Expected length of therapy lifetime.    Warfarin PTA Regimen: 1.25 mg M and 2.5 mg ROW      Anticoagulation Dose History     Recent Dosing and Labs Latest Ref Rng & Units 6/1/2020 6/14/2020 7/3/2020 8/4/2020 8/5/2020 8/6/2020 8/7/2020    Warfarin 2.5 mg - - - - - - 2.5 mg -    INR 0.86 - 1.14 2.00(H) 3.00(H) 2.40(H) 4.23(H) 3.73(H) 2.89(H) 2.15(H)    INR 0.86 - 1.14 - - - - - - -          Vitamin K doses administered during the last 7 days: none  FFP administered during the last 7 days: none    Recommend discharging the patient on a warfarin regimen of 3.75 mg today, then resume home dosing of 1.25 mg M and 2.5 mg ROW.  The patient should have INR checked by Wednesday, August 12 at the latest.      Rachel Joshi, PharmD Student

## 2020-08-07 NOTE — PROGRESS NOTES
ANTICOAGULATION  MANAGEMENT: Discharge Review    Antoine JESSICA Russo chart reviewed for anticoagulation continuity of care    Hospital Admission on 8/4/020 for Pancreatitis.    Discharge disposition: Home    Results:    Recent labs: (last 7 days)     08/04/20  0957 08/05/20  0429 08/06/20  0747 08/07/20  0425   INR 4.23* 3.73* 2.89* 2.15*     Anticoagulation inpatient management:     less warfarin administered than maintenance regimen    Anticoagulation discharge instructions:     Warfarin dosing: home regimen continued   Bridging: No   INR goal change: No      Medication changes affecting anticoagulation: No    Additional factors affecting anticoagulation: Yes: Pancreatitis    Plan     No adjustment to anticoagulation plan needed    Patient not contacted    Anticoagulation Calendar updated    Angela Harrell RN

## 2020-08-10 ENCOUNTER — TELEPHONE (OUTPATIENT)
Dept: FAMILY MEDICINE | Facility: CLINIC | Age: 75
End: 2020-08-10

## 2020-08-10 ENCOUNTER — OFFICE VISIT (OUTPATIENT)
Dept: FAMILY MEDICINE | Facility: CLINIC | Age: 75
End: 2020-08-10
Payer: COMMERCIAL

## 2020-08-10 ENCOUNTER — APPOINTMENT (OUTPATIENT)
Dept: LAB | Facility: CLINIC | Age: 75
End: 2020-08-10
Payer: COMMERCIAL

## 2020-08-10 ENCOUNTER — ANTICOAGULATION THERAPY VISIT (OUTPATIENT)
Dept: ANTICOAGULATION | Facility: CLINIC | Age: 75
End: 2020-08-10

## 2020-08-10 VITALS
SYSTOLIC BLOOD PRESSURE: 138 MMHG | TEMPERATURE: 97 F | DIASTOLIC BLOOD PRESSURE: 78 MMHG | OXYGEN SATURATION: 94 % | HEART RATE: 95 BPM | RESPIRATION RATE: 18 BRPM | HEIGHT: 70 IN | BODY MASS INDEX: 29.06 KG/M2 | WEIGHT: 203 LBS

## 2020-08-10 DIAGNOSIS — K85.20 ALCOHOL-INDUCED ACUTE PANCREATITIS WITHOUT INFECTION OR NECROSIS: ICD-10-CM

## 2020-08-10 DIAGNOSIS — I48.20 CHRONIC ATRIAL FIBRILLATION (H): ICD-10-CM

## 2020-08-10 DIAGNOSIS — Z79.4 TYPE 2 DIABETES MELLITUS WITH DIABETIC NEUROPATHY, WITH LONG-TERM CURRENT USE OF INSULIN (H): ICD-10-CM

## 2020-08-10 DIAGNOSIS — E11.40 TYPE 2 DIABETES MELLITUS WITH DIABETIC NEUROPATHY, WITH LONG-TERM CURRENT USE OF INSULIN (H): ICD-10-CM

## 2020-08-10 DIAGNOSIS — Z79.01 LONG TERM CURRENT USE OF ANTICOAGULANT THERAPY: ICD-10-CM

## 2020-08-10 DIAGNOSIS — Z09 HOSPITAL DISCHARGE FOLLOW-UP: Primary | ICD-10-CM

## 2020-08-10 LAB
CAPILLARY BLOOD COLLECTION: NORMAL
INR PPP: 2.9 (ref 0.86–1.14)

## 2020-08-10 PROCEDURE — 99214 OFFICE O/P EST MOD 30 MIN: CPT | Performed by: NURSE PRACTITIONER

## 2020-08-10 PROCEDURE — 85610 PROTHROMBIN TIME: CPT | Performed by: FAMILY MEDICINE

## 2020-08-10 PROCEDURE — 36416 COLLJ CAPILLARY BLOOD SPEC: CPT | Performed by: FAMILY MEDICINE

## 2020-08-10 ASSESSMENT — MIFFLIN-ST. JEOR: SCORE: 1677.05

## 2020-08-10 NOTE — TELEPHONE ENCOUNTER
"ED/Discharge Protocol    \"Hi, my name is Lupis Hurt RN, a registered nurse, and I am calling on behalf of Dr. Martino's's office at [place].  I am calling to follow up and see how things are going for you after your recent visit.\"    \"I see that you were in the (ER/UC/IP) on 8/4/20.    How are you doing now that you are home?\" doing well.  Was seen today    Is patient experiencing symptoms that may require a hospital visit?  no    Discharge Instructions    \"Let's review your discharge instructions.  What is/are the follow-up recommendations?  Pt. Response: was seen today in clinic    \"Were you instructed to make a follow-up appointment?\"  Pt. Response: Yes.  Has appointment been made?   Yes      \"When you see the provider, I would recommend that you bring your discharge instructions with you.    Medications    \"How many new medications are you on since your hospitalization/ED visit?\"    0-1  \"How many of your current medicines changed (dose, timing, name, etc.) while you were in the hospital/ED visit?\"   0-1  \"Do you have questions about your medications?\"   No  \"Were you newly diagnosed with heart failure, COPD, diabetes or did you have a heart attack?\"   No  For patients on insulin: \"Did you start on insulin in the hospital or did you have your insulin dose changed?\"   No  Post Discharge Medication Reconciliation Status: discharge medications reconciled, continue medications without change.    Was MTM referral placed (*Make sure to put transitions as reason for referral)?   No    Call Summary    \"Do you have any questions or concerns about your condition or care plan at the moment?\"    No  Triage nurse advice given: call if questions         \"If you have questions or things don't continue to improve, we encourage you contact us through the main clinic number,  [phone].  Even if the clinic is not open, triage nurses are available 24/7 to help you.     We would like you to know that our clinic has " "extended hours (provide information).  We also have urgent care (provide details on closest location and hours/contact info)\"      \"Thank you for your time and take care!\"        "

## 2020-08-10 NOTE — PROGRESS NOTES
ANTICOAGULATION MANAGEMENT     Patient Name:  Antoine Russo  Date:  8/10/2020    ASSESSMENT /SUBJECTIVE:    Today's INR result of 2.90 is therapeutic. Goal INR of 2.0-3.0      Warfarin dose taken: Warfarin taken as previously instructed    Diet: No new diet changes affecting INR    Medication changes/ interactions: No new medications/supplements affecting INR    Previous INR: Therapeutic 2.15    S/S of bleeding or thromboembolism: No    New injury or illness: Yes: recent pancreatitis requiring hospitalization    Upcoming surgery, procedure or cardioversion: No    Additional findings: Patient was seen by provider today for post-hospital follow up. Patient was advised to decrease alcohol intake.       PLAN:    Spoke with Antoine regarding INR result and instructed:     Warfarin Dosing Instructions: Continue your current warfarin dose 1.25mg Mon; 2.5mg all other days    Instructed patient to follow up no later than: 11 days  Lab visit scheduled    Education provided: Potential interaction between warfarin and alcohol and Potential interaction between warfarin and inflammation/pain Importance of notifying clinic for changes in medications/health      Poli verbalizes understanding and agrees to warfarin dosing plan.    Instructed to call the Anticoagulation Clinic for any changes, questions or concerns. (#[phone])      OBJECTIVE:  Recent labs: (last 7 days)     20  0957 20  0429 20  0747 20  0425 08/10/20  0959   INR 4.23* 3.73* 2.89* 2.15* 2.90*         INR assessment THER    Recheck INR In: 10 DAYS    INR Location Clinic      Anticoagulation Summary  As of 8/10/2020    INR goal:   2.0-3.0   TTR:   47.2 % (12 mo)   INR used for dosin.90 (8/10/2020)   Warfarin maintenance plan:   1.25 mg (2.5 mg x 0.5) every Mon; 2.5 mg (2.5 mg x 1) all other days   Full warfarin instructions:   1.25 mg every Mon; 2.5 mg all other days   Weekly warfarin total:   16.25 mg   No change documented:    Adeline Duke, RN   Plan last modified:   Aparna Kunz, RN (3/26/2020)   Next INR check:   8/21/2020   Priority:   Critical   Target end date:   Indefinite    Indications    Chronic atrial fibrillation (H) [I48.20]  Long term current use of anticoagulant therapy [Z79.01]             Anticoagulation Episode Summary     INR check location:       Preferred lab:       Send INR reminders to:   [facility]    Comments:   * 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 No bandaid. 2-3 light beers daily.       Anticoagulation Care Providers     Provider Role Specialty Phone number    Katie Martino MD Referring [facility] [phone]

## 2020-08-10 NOTE — NURSING NOTE
"Chief Complaint   Patient presents with     Hospital F/U       Initial BP (!) 158/70   Pulse 95   Temp 97  F (36.1  C) (Tympanic)   Resp 18   Ht 1.778 m (5' 10\")   Wt 92.1 kg (203 lb)   SpO2 94%   BMI 29.13 kg/m   Estimated body mass index is 29.13 kg/m  as calculated from the following:    Height as of this encounter: 1.778 m (5' 10\").    Weight as of this encounter: 92.1 kg (203 lb).    Patient presents to the clinic using     Health Maintenance that is potentially due pending provider review:          Is there anyone who you would like to be able to receive your results?   If yes have patient fill out VIDYA    "

## 2020-08-10 NOTE — PROGRESS NOTES
Subjective     Antoine Russo is a 74 year old male who presents to clinic today for the following health issues:    HPI         Hospital Follow-up Visit:    Hospital/Nursing Home/IP Rehab Facility: [facility]  Date of Admission: 08/04/20  Date of Discharge: 08/07/20  Reason(s) for Admission: Pancreatitis       Was your hospitalization related to COVID-19? No   Problems taking medications regularly:  None  Medication changes since discharge: None  Problems adhering to non-medication therapy:  None    Summary of hospitalization:  [facility] discharge summary reviewed  Diagnostic Tests/Treatments reviewed.  Follow up needed: none  Other Healthcare Providers Involved in Patient s Care:         None  Update since discharge: improved.   Post Discharge Medication Reconciliation: discharge medications reconciled, continue medications without change.  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)     Peripheral vascular disease (H)     Colitis     Groin fluid collection     Clostridium difficile enterocolitis     Health Care Home     H/O colectomy     IPMN (intraductal papillary mucinous neoplasm)     Pancreatic duct leak     Type 2 diabetes mellitus with diabetic polyneuropathy, without long-term current use of insulin (H)     Left varicocele     Anticipated difficulty with intubation     Spleen absent     Chronic atrial fibrillation (H)     Recurrent pancreatitis     Long term current use of anticoagulant therapy     Acute  pancreatitis     Pancreatic pseudocyst     Alcohol dependence, uncomplicated (H)     Acute right-sided low back pain with left-sided sciatica     Spinal stenosis of lumbar region, unspecified whether neurogenic claudication present     Peripheral polyneuropathy     Chronic low back pain with sciatica, sciatica laterality unspecified, unspecified back pain laterality     Past Surgical History:   Procedure Laterality Date     BREAST SURGERY  2008    right breast mass benign     COLONOSCOPY      multiple polyps removed     COLONOSCOPY  8/24/2011    Procedure:COMBINED COLONOSCOPY, REMOVE TUMOR/POLYP/LESION BY SNARE; Surgeon:MILEY ARBOLEDA; Location:[facility]     COLONOSCOPY  12/17/2012    Procedure: COLONOSCOPY;;  Surgeon: Leon Maurer MD;  [facility]     COLONOSCOPY  12/18/2012    Procedure: COLONOSCOPY;;  Surgeon: Leon Maurer MD;  [facility]     DENERVATION OF SPERMATIC CORD MICROSURGICAL Left 5/23/2017    Procedure: DENERVATION OF SPERMATIC CORD MICROSURGICAL;;  Surgeon: Marcio Aggarwal MD;  [facility]     DISSECTION RADICAL NECK BILATERAL  8/2/2012    Procedure: DISSECTION RADICAL NECK BILATERAL;;  Surgeon: Yung Alvares MD;  [facility]     ENDOSCOPIC RETROGRADE CHOLANGIOPANCREATOGRAM N/A 5/10/2016    Procedure: COMBINED ENDOSCOPIC RETROGRADE CHOLANGIOPANCREATOGRAPHY, PLACE TUBE/STENT;  Surgeon: Yovanny Beasley MD;  [facility]     ENDOSCOPIC RETROGRADE CHOLANGIOPANCREATOGRAM N/A 3/29/2018    Procedure: ENDOSCOPIC RETROGRADE CHOLANGIOPANCREATOGRAM;  Endoscopic Retrograde Cholangiopancreatogram, Endoscopic Ultrasound, Biliary Sphincterotomy, Biliary and Pancreatic Stent Placement;  Surgeon: Yovanny Beasley MD;  [facility]     ENDOSCOPIC ULTRASOUND UPPER GASTROINTESTINAL TRACT (GI) N/A 2/3/2016    Procedure: ENDOSCOPIC ULTRASOUND, ESOPHAGOSCOPY / UPPER GASTROINTESTINAL TRACT (GI);  Surgeon: Grabiel Plata MD;  [facility]     ENDOSCOPIC  ULTRASOUND UPPER GASTROINTESTINAL TRACT (GI) N/A 3/29/2018    Procedure: ENDOSCOPIC ULTRASOUND, ESOPHAGOSCOPY / UPPER GASTROINTESTINAL TRACT (GI);;  Surgeon: Grabiel Plata MD;  [facility]     ESOPHAGOSCOPY, GASTROSCOPY, DUODENOSCOPY (EGD), COMBINED N/A 2/3/2016    Procedure: COMBINED ENDOSCOPIC ULTRASOUND, ESOPHAGOSCOPY, GASTROSCOPY, DUODENOSCOPY (EGD), FINE NEEDLE ASPIRATE/BIOPSY;  Surgeon: Grabiel Plata MD;  [facility]     ESOPHAGOSCOPY, GASTROSCOPY, DUODENOSCOPY (EGD), COMBINED N/A 6/8/2016    Procedure: COMBINED ESOPHAGOSCOPY, GASTROSCOPY, DUODENOSCOPY (EGD), REMOVE FOREIGN BODY;  Surgeon: Yovanny Beasley MD;  [facility]     ESOPHAGOSCOPY, GASTROSCOPY, DUODENOSCOPY (EGD), COMBINED N/A 4/17/2018    Procedure: COMBINED ESOPHAGOSCOPY, GASTROSCOPY, DUODENOSCOPY (EGD), REMOVE FOREIGN BODY;  EGD with stent removal;  Surgeon: Grabiel Plata MD;  [facility]     EXCISE LESION INTRAORAL  6/14/2012    Procedure: EXCISE LESION INTRAORAL;  Wide Local Excision Floor of Mouth, Direct Laryngoscopy, Bilateral Ida's Marsuplization, Split Thickness Skin Graft from right Thigh  Latex Safe;  Surgeon: Gerson Ravi MD;  [facility]     EXCISE LESION INTRAORAL  8/2/2012    Procedure: EXCISE LESION INTRAORAL;  Floor of Mouth Resection, Bilateral Selective Radical Neck Dissection, Tracheostomy, Left Radial Forearm  Free Flap with Alloderm, Nasogastric Feeding Tube Placement,    * Latex Safe*;  Surgeon: Gerson Ravi MD;  [facility]     EXCISE LESION INTRAORAL  12/11/2012    Procedure: EXCISE LESION INTRAORAL;  takedown of oral flap;  Surgeon: Yung Alvares MD;  [facility]     GRAFT FREE VASCULARIZED (LOCATION)  8/2/2012    Procedure: GRAFT FREE VASCULARIZED (LOCATION);;  Surgeon: Yung Alvares MD;  [facility]     GRAFT SKIN SPLIT THICKNESS FROM EXTREMITY  6/14/2012    Procedure: GRAFT SKIN SPLIT THICKNESS FROM EXTREMITY;;  Surgeon: Gerson Ravi MD;   "[facility]     LAPAROSCOPIC ILEOSTOMY TAKEDOWN  2013    Procedure: LAPAROSCOPIC ILEOSTOMY TAKEDOWN;  Laparoscopic Closure of Enterostomy, Guerda's Type with IleoRectal Anastomosis ;  Surgeon: Grabiel Riddle MD;  [facility]     LAPAROTOMY EXPLORATORY  2012    Procedure: LAPAROTOMY EXPLORATORY;  Exploratory Laparotomy, total abdominal colectomy, ileostomy formation;  Surgeon: Miquel Cannon MD;  [facility]     LARYNGOSCOPY  2012    Procedure: LARYNGOSCOPY;;  Surgeon: Gerson Ravi MD;  [facility]     ORTHOPEDIC SURGERY      ganglian cyst left ankle     PANCREATECTOMY, SPLENECTOMY N/A 3/10/2016    Procedure: PANCREATECTOMY, SPLENECTOMY;  Surgeon: Nael Abel MD;  [facility]     SHOULDER SURGERY  , 2008- right rotator cuff,  bone spur on left. Dr. Hdez     VARICOCELECTOMY Left 2017    Procedure: VARICOCELECTOMY;  Left Varicocele Repair, Denervation of Left Testis;  Surgeon: Marcio Aggarwal MD;  [facility]       Social History     Tobacco Use     Smoking status: Former Smoker     Packs/day: 1.00     Years: 40.00     Pack years: 40.00     Types: Cigarettes     Last attempt to quit: 2006     Years since quittin.7     Smokeless tobacco: Never Used   Substance Use Topics     Alcohol use: Not Currently     Comment: \"1 beer on Thanksgiving day\"     Family History   Problem Relation Age of Onset     Diabetes Sister         onset age 50     Alzheimer Disease Mother          80     Alzheimer Disease Father          85     Diabetes Other         nephew type 1     Diabetes Other      Aneurysm Sister      Anesthesia Reaction No family hx of      Colon Cancer No family hx of      Colon Polyps No family hx of      Crohn's Disease No family hx of      Ulcerative Colitis No family hx of          Current Outpatient Medications   Medication Sig Dispense Refill     insulin isophane human (NOVOLIN N FLEXPEN RELION) 100 UNIT/ML injection " Take 34 units of N in the am and 32 units in the pm. 30 mL 5     lisinopril (PRINIVIL/ZESTRIL) 5 MG tablet Take 1 tablet (5 mg) by mouth daily 90 tablet 3     metoprolol succinate ER (TOPROL-XL) 50 MG 24 hr tablet TAKE 1 & 1/2 (ONE & ONE-HALF) TABLETS BY MOUTH TWICE DAILY 270 tablet 0     multivitamin, therapeutic with minerals (THERA-VIT-M) TABS Take 1 tablet by mouth daily. 30 each 1     rosuvastatin (CRESTOR) 10 MG tablet Take 1 tablet (10 mg) by mouth daily 90 tablet 3     tamsulosin (FLOMAX) 0.4 MG capsule TAKE 1 CAPSULE BY MOUTH ONCE DAILY -  NEEDS  BP  RECHECK  NOW 90 capsule 1     vitamin B-12 (CYANOCOBALAMIN) 100 MCG tablet Take 100 mcg by mouth daily       warfarin ANTICOAGULANT (COUMADIN) 2.5 MG tablet Take 3.75mg (1.5 tablets) today, 8/7/20. Then resume taking 1/2 tablet (1.25mg) every Monday, 1 tablet by mouth all other days starting tomorrow, 8/8/20, or as directed by anticoagulation clinic. 85 tablet 0     ASPIRIN NOT PRESCRIBED (INTENTIONAL) Antiplatelet medication not prescribed intentionally due to Current anticoagulant therapy (warfarin/enoxaparin)       Continuous Blood Gluc  (FREESTYLE LISA 14 DAY READER) JOSE ALBERTO 1 each as needed (use to scan blood sugars from sensor) 1 Device 0     Continuous Blood Gluc Sensor (FREESTYLE LISA 14 DAY SENSOR) MISC 1 each every 14 days 2 each 11     insulin pen needle (BD LUIS U/F) 32G X 4 MM miscellaneous USE PEN NEEDLE ONCE DAILY AS DIRECTED 60 each 0     ondansetron (ZOFRAN ODT) 4 MG ODT tab Take 1 tablet (4 mg) by mouth every 8 hours as needed for nausea 10 tablet 0     Allergies   Allergen Reactions     Nkda [No Known Drug Allergies]      Recent Labs   Lab Test 08/07/20  0425 08/06/20  0747 08/05/20  0429  05/12/20  0917 02/04/20  0836  10/17/19  0827  05/22/19  1159  01/17/19  0834  10/22/18  1651   A1C  --   --   --   --  6.3* 6.5*  --  9.0*   < >  --    < >  --    < > 6.9*   LDL  --   --   --   --   --  74  --   --   --  82  --   --   --  90   HDL   "--   --   --   --   --  45  --   --   --  31*  --   --   --  43   TRIG  --   --   --   --   --  121  --   --   --  188*  --   --   --  148   ALT 21 18 19   < >  --  28   < >  --    < >  --    < > 31   < >  --    CR 0.89 0.78 0.84   < >  --  0.98   < >  --    < >  --    < > 0.95   < >  --    GFRESTIMATED 85 90 87   < >  --  76   < >  --    < >  --    < > 80   < >  --    GFRESTBLACK >90 >90 >90   < >  --  88   < >  --    < >  --    < > >90   < >  --    POTASSIUM 4.1 4.4 4.3   < >  --  5.1   < >  --    < >  --    < > 4.5   < >  --    TSH  --   --   --   --   --  1.58  --   --   --   --   --  1.58  --   --     < > = values in this interval not displayed.      BP Readings from Last 3 Encounters:   08/10/20 138/78   08/07/20 137/61   07/26/20 128/76    Wt Readings from Last 3 Encounters:   08/10/20 92.1 kg (203 lb)   08/07/20 93.8 kg (206 lb 12.7 oz)   07/26/20 93 kg (205 lb)                    Reviewed and updated as needed this visit by Provider         Review of Systems   Constitutional, HEENT, cardiovascular, pulmonary, gi and gu systems are negative, except as otherwise noted.      Objective    BP (!) 158/70   Pulse 95   Temp 97  F (36.1  C) (Tympanic)   Resp 18   Ht 1.778 m (5' 10\")   Wt 92.1 kg (203 lb)   SpO2 94%   BMI 29.13 kg/m    Body mass index is 29.13 kg/m .  Physical Exam   GENERAL: healthy, alert and no distress  EYES: Eyes grossly normal to inspection, PERRL and conjunctivae and sclerae normal  HENT: ear canals and TM's normal, nose and mouth without ulcers or lesions  NECK: no adenopathy, no asymmetry, masses, or scars and thyroid normal to palpation  RESP: lungs clear to auscultation - no rales, rhonchi or wheezes  CV: regular rate and rhythm, normal S1 S2, no S3 or S4, no murmur, click or rub, no peripheral edema and peripheral pulses strong  ABDOMEN: soft, nontender, no hepatosplenomegaly, no masses and bowel sounds normal  MS: no gross musculoskeletal defects noted, no edema  SKIN: no " suspicious lesions or rashes  NEURO: Normal strength and tone, mentation intact and speech normal  PSYCH: mentation appears normal, affect normal/bright            Assessment & Plan   (Z09) Hospital discharge follow-up  (primary encounter diagnosis)  Comment: Improved patient has improved post discharge  Plan: *Continue to monitor for any increase in symptoms    (K85.20) Alcohol-induced acute pancreatitis without infection or necrosis  Comment: *Continue decreasing alcohol intake  Plan:    (I48.20) Chronic atrial fibrillation (H)  Comment:  Controlled no change in treatment plan  Plan: Capillary Blood Collection      (E11.40,  Z79.4) Type 2 diabetes mellitus with diabetic neuropathy, with long-term current use of insulin (H)  Comment:  Controlled no change in treatment plan last hemoglobin A1c 6.5  Plan: Follow-up in 3 months           See Patient Instructions    Return in about 3 months (around 11/10/2020) for In-Clinic Visit or Video Visit for your diabetes.    FERNANDO Camacho [facility]

## 2020-08-10 NOTE — TELEPHONE ENCOUNTER
ED/UC/IP follow up phone call:    RN please call to follow up.    Number of ED visits in past 12 months = 1  [facility]

## 2020-08-10 NOTE — PATIENT INSTRUCTIONS
Patient Education     Diet: Diabetes  Food is an important tool that you can use to control diabetes and stay healthy. Eating well-balanced meals in the correct amounts will help you control your blood glucose levels and prevent low blood sugar reactions. It will also help you reduce the health risks of diabetes. There is no one specific diet that is right for everyone with diabetes. But there are general guidelines to follow. A registered dietitian (RD) will create a tailored diet approach that s just right for you. He or she will also help you plan healthy meals and snacks. If you have any questions, call your dietitian for advice.     Guidelines for success  Talk with your healthcare provider before starting a diabetes diet or weight loss program. If you haven't talked with a dietitian yet, ask your provider for a referral. The following guidelines can help you succeed:    Select foods from the 6 food groups below. Your dietitian will help you find food choices within each group. He or she will also show you serving sizes and how many servings you can have at each meal.  ? Grains, beans, and starchy vegetables  ? Vegetables  ? Fruit  ? Milk or yogurt  ? Meat, poultry, fish, or tofu  ? Healthy fats    Check your blood sugar levels as directed by your provider. Take any medicine as prescribed by your provider.    Learn to read food labels and pick the right portion sizes.    Eat only the amount of food in your meal plan. Eat about the same amount of food at regular times each day. Don t skip meals. Eat meals 4 to 5 hours apart, with snacks in between.    Limit alcohol. It raises blood sugar levels. Drink water or calorie-free diet drinks that use safe sweeteners.    Eat less fat to help lower your risk of heart disease. Use nonfat or low-fat dairy products and lean meats. Avoid fried foods. Use cooking oils that are unsaturated, such as olive, canola, or peanut oil.    Talk with your dietitian about safe sugar  substitutes.    Avoid added salt. It can contribute to high blood pressure, which can cause heart disease. People with diabetes already have a risk of high blood pressure and heart disease.    Stay at a healthy weight. If you need to lose weight, cut down on your portion sizes. But don t skip meals. Exercise is an important part of any weight management program. Talk with your provider about an exercise program that s right for you.    For more information about the best diet plan for you, talk with a registered dietitian (RD). To find an RD in your area, contact:  ? Academy of Nutrition and Dietetics www.eatright.org  ? The American Diabetes Association [phone] www.diabetes.org  Date Last Reviewed: 8/1/2016 2000-2019 [employer]. [address] [place] [address], [place] [address]. All rights reserved. This information is not intended as a substitute for professional medical care. Always follow your healthcare professional's instructions.

## 2020-08-13 DIAGNOSIS — R39.15 URGENCY OF URINATION: ICD-10-CM

## 2020-08-13 DIAGNOSIS — N40.0 HYPERTROPHY OF PROSTATE WITHOUT URINARY OBSTRUCTION: ICD-10-CM

## 2020-08-13 RX ORDER — TAMSULOSIN HYDROCHLORIDE 0.4 MG/1
0.4 CAPSULE ORAL DAILY
Qty: 90 CAPSULE | Refills: 3 | Status: SHIPPED | OUTPATIENT
Start: 2020-08-13 | End: 2021-07-13

## 2020-08-21 ENCOUNTER — ANTICOAGULATION THERAPY VISIT (OUTPATIENT)
Dept: ANTICOAGULATION | Facility: CLINIC | Age: 75
End: 2020-08-21
Payer: COMMERCIAL

## 2020-08-21 DIAGNOSIS — I48.20 CHRONIC ATRIAL FIBRILLATION (H): ICD-10-CM

## 2020-08-21 DIAGNOSIS — Z79.01 LONG TERM CURRENT USE OF ANTICOAGULANT THERAPY: ICD-10-CM

## 2020-08-21 LAB
CAPILLARY BLOOD COLLECTION: NORMAL
INR PPP: 4 (ref 0.86–1.14)

## 2020-08-21 PROCEDURE — 36416 COLLJ CAPILLARY BLOOD SPEC: CPT | Performed by: FAMILY MEDICINE

## 2020-08-21 PROCEDURE — 85610 PROTHROMBIN TIME: CPT | Performed by: FAMILY MEDICINE

## 2020-08-21 PROCEDURE — 99207 ZZC NO CHARGE NURSE ONLY: CPT

## 2020-08-21 NOTE — PROGRESS NOTES
ANTICOAGULATION FOLLOW-UP CLINIC VISIT    Patient Name:  Antoine Russo  Date:  2020  Contact Type:  Telephone    SUBJECTIVE:  Patient Findings     Positives:   Missed doses (Monday)    Comments:   No changes in medications, activity, or diet noted. No concerns with clotting, bleeding, or increased bruising noted.  Denies inflammation, infection, illness, or allergies. Denies increase in alcohol.  Pt is to skip dose today, did adjust maintenance dose by 7.7% (decreased maintenance) and recheck INR in one week.  Patient educated on the increased risk for bleeding, precautions to take, and when to seek medical attention.  Patient verbalizes understanding and agrees to plan. No further questions or concerns.        Clinical Outcomes     Negatives:   Major bleeding event, Thromboembolic event, Anticoagulation-related hospital admission, Anticoagulation-related ED visit, Anticoagulation-related fatality    Comments:   No changes in medications, activity, or diet noted. No concerns with clotting, bleeding, or increased bruising noted.  Denies inflammation, infection, illness, or allergies. Denies increase in alcohol.  Pt is to skip dose today, did adjust maintenance dose by 7.7% (decreased maintenance) and recheck INR in one week.  Patient educated on the increased risk for bleeding, precautions to take, and when to seek medical attention.  Patient verbalizes understanding and agrees to plan. No further questions or concerns.           OBJECTIVE    Recent labs: (last 7 days)     20  0931   INR 4.00*       ASSESSMENT / PLAN  INR assessment SUPRA    Recheck INR In: 1 WEEK    INR Location Clinic      Anticoagulation Summary  As of 2020    INR goal:   2.0-3.0   TTR:   44.5 % (12 mo)   INR used for dosin.00! (2020)   Warfarin maintenance plan:   1.25 mg (2.5 mg x 0.5) every Mon, Fri; 2.5 mg (2.5 mg x 1) all other days   Full warfarin instructions:   : Hold; Otherwise 1.25 mg every Mon, Fri;  2.5 mg all other days   Weekly warfarin total:   15 mg   Plan last modified:   Madhavi Sanders RN (8/21/2020)   Next INR check:   8/28/2020   Priority:   Critical   Target end date:   Indefinite    Indications    Chronic atrial fibrillation (H) [I48.20]  Long term current use of anticoagulant therapy [Z79.01]             Anticoagulation Episode Summary     INR check location:       Preferred lab:       Send INR reminders to:   [facility]    Comments:   * 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 No bandaid. 2-3 light beers daily.       Anticoagulation Care Providers     Provider Role Specialty Phone number    Katie Martino MD Referring [facility] [phone]            See the Encounter Report to view Anticoagulation Flowsheet and Dosing Calendar (Go to Encounters tab in chart review, and find the Anticoagulation Therapy Visit)        Madhavi Sanders RN

## 2020-08-28 ENCOUNTER — ANTICOAGULATION THERAPY VISIT (OUTPATIENT)
Dept: ANTICOAGULATION | Facility: CLINIC | Age: 75
End: 2020-08-28

## 2020-08-28 DIAGNOSIS — Z79.01 LONG TERM CURRENT USE OF ANTICOAGULANT THERAPY: ICD-10-CM

## 2020-08-28 DIAGNOSIS — I48.20 CHRONIC ATRIAL FIBRILLATION (H): ICD-10-CM

## 2020-08-28 LAB
CAPILLARY BLOOD COLLECTION: NORMAL
INR PPP: 4.2 (ref 0.86–1.14)

## 2020-08-28 PROCEDURE — 36416 COLLJ CAPILLARY BLOOD SPEC: CPT | Performed by: FAMILY MEDICINE

## 2020-08-28 PROCEDURE — 85610 PROTHROMBIN TIME: CPT | Performed by: FAMILY MEDICINE

## 2020-08-28 NOTE — PROGRESS NOTES
"ANTICOAGULATION MANAGEMENT     Patient Name:  Antoine Russo  Date:  2020    ASSESSMENT /SUBJECTIVE:    Today's INR result of 4.20 is supratherapeutic. Goal INR of 2.0-3.0      Warfarin dose taken: Warfarin taken as previously instructed    Diet: No new diet changes affecting INR, no change in alcohol intake    Medication changes/ interactions: No new medications/supplements affecting INR    Previous INR: Supratherapeutic 4.00    S/S of bleeding or thromboembolism: No    New injury or illness: No - has intermittent back pain, states he did more bending this past week to get \"stuff done\" and his back hurts a little more than usual    Upcoming surgery, procedure or cardioversion: No    Additional findings: None      PLAN:    Spoke with Antoine regarding INR result and instructed:     Warfarin Dosing Instructions: Hold today then change your warfarin dose to 1.25mg Mon,Wed,Sat; 2.5mg all other days (~8.3% decrease from maintenance dose currently listed)    Instructed patient to follow up no later than: 1 week  Lab visit scheduled    Education provided: Monitoring for bleeding signs and symptoms and When to seek medical attention/emergency care Importance of notifying clinic for changes in medications/health      Poli verbalizes understanding and agrees to warfarin dosing plan.    Instructed to call the Anticoagulation Clinic for any changes, questions or concerns. (#[phone])      OBJECTIVE:  Recent labs: (last 7 days)     20  0733   INR 4.20*         INR assessment SUPRA    Recheck INR In: 1 WEEK    INR Location Clinic      Anticoagulation Summary  As of 2020    INR goal:   2.0-3.0   TTR:   42.5 % (12 mo)   INR used for dosin.20! (2020)   Warfarin maintenance plan:   1.25 mg (2.5 mg x 0.5) every Mon, Wed, Sat; 2.5 mg (2.5 mg x 1) all other days   Full warfarin instructions:   : Hold; Otherwise 1.25 mg every Mon, Wed, Sat; 2.5 mg all other days   Weekly warfarin total:   13.75 mg "   Plan last modified:   Adeline Duke RN (8/28/2020)   Next INR check:   9/4/2020   Priority:   High   Target end date:   Indefinite    Indications    Chronic atrial fibrillation (H) [I48.20]  Long term current use of anticoagulant therapy [Z79.01]             Anticoagulation Episode Summary     INR check location:       Preferred lab:       Send INR reminders to:   [facility]    Comments:   * 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 No bandaid. 2-3 light beers daily.       Anticoagulation Care Providers     Provider Role Specialty Phone number    Katie Martino MD [facility] [phone]

## 2020-09-04 ENCOUNTER — ANTICOAGULATION THERAPY VISIT (OUTPATIENT)
Dept: ANTICOAGULATION | Facility: CLINIC | Age: 75
End: 2020-09-04

## 2020-09-04 DIAGNOSIS — Z79.01 LONG TERM CURRENT USE OF ANTICOAGULANT THERAPY: ICD-10-CM

## 2020-09-04 DIAGNOSIS — I48.20 CHRONIC ATRIAL FIBRILLATION (H): ICD-10-CM

## 2020-09-04 LAB
CAPILLARY BLOOD COLLECTION: NORMAL
INR PPP: 3.2 (ref 0.86–1.14)

## 2020-09-04 PROCEDURE — 85610 PROTHROMBIN TIME: CPT | Performed by: FAMILY MEDICINE

## 2020-09-04 PROCEDURE — 36416 COLLJ CAPILLARY BLOOD SPEC: CPT | Performed by: FAMILY MEDICINE

## 2020-09-04 NOTE — PROGRESS NOTES
ANTICOAGULATION MANAGEMENT     Patient Name:  Antoine Russo  Date:  9/4/2020    ASSESSMENT /SUBJECTIVE:    Today's INR result of 3.20 is supratherapeutic. Goal INR of 2.0-3.0      Warfarin dose taken: Warfarin taken as previously instructed    Diet: No new diet changes affecting INR    Medication changes/ interactions: No new medications/supplements affecting INR    Previous INR: Supratherapeutic 4.20    S/S of bleeding or thromboembolism: No    New injury or illness: No    Upcoming surgery, procedure or cardioversion: No    Additional findings: No known reason for the ongoing elevated INR.      PLAN:    Spoke with Antoine regarding INR result and instructed ([employer] message sent):    Warfarin Dosing Instructions: Change your warfarin dose to 2.5mg Tues/Thurs/Sat; 1.25mg all other days (~9.1% decrease)    Instructed patient to follow up no later than: 1 week  Lab visit scheduled    Education provided: Importance of notifying clinic for changes in medications/health      Poli verbalizes understanding and agrees to warfarin dosing plan.    Instructed to call the Anticoagulation Clinic for any changes, questions or concerns. (#[phone])      OBJECTIVE:  Recent labs: (last 7 days)     09/04/20  0836   INR 3.20*         INR assessment SUPRA    Recheck INR In: 1 WEEK    INR Location Clinic      Anticoagulation Summary  As of 9/4/2020    INR goal:   2.0-3.0   TTR:   41.8 % (12 mo)   INR used for dosing:   3.20! (9/4/2020)   Warfarin maintenance plan:   2.5 mg (2.5 mg x 1) every Tue, Thu, Sat; 1.25 mg (2.5 mg x 0.5) all other days   Full warfarin instructions:   2.5 mg every Tue, Thu, Sat; 1.25 mg all other days   Weekly warfarin total:   12.5 mg   Plan last modified:   Adeline Duke RN (9/4/2020)   Next INR check:   9/11/2020   Priority:   High   Target end date:   Indefinite    Indications    Chronic atrial fibrillation (H) [I48.20]  Long term current use of anticoagulant therapy [Z79.01]              Anticoagulation Episode Summary     INR check location:       Preferred lab:       Send INR reminders to:   [facility]    Comments:   * 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 No bandaid. 2-3 light beers daily.       Anticoagulation Care Providers     Provider Role Specialty Phone number    Katie Martino MD [facility] [phone]

## 2020-09-10 DIAGNOSIS — I10 HYPERTENSION, BENIGN ESSENTIAL, GOAL BELOW 140/90: ICD-10-CM

## 2020-09-11 ENCOUNTER — ANTICOAGULATION THERAPY VISIT (OUTPATIENT)
Dept: ANTICOAGULATION | Facility: CLINIC | Age: 75
End: 2020-09-11

## 2020-09-11 DIAGNOSIS — I48.20 CHRONIC ATRIAL FIBRILLATION (H): ICD-10-CM

## 2020-09-11 DIAGNOSIS — Z79.01 LONG TERM CURRENT USE OF ANTICOAGULANT THERAPY: ICD-10-CM

## 2020-09-11 LAB
CAPILLARY BLOOD COLLECTION: NORMAL
INR PPP: 2.5 (ref 0.86–1.14)

## 2020-09-11 PROCEDURE — 36416 COLLJ CAPILLARY BLOOD SPEC: CPT | Performed by: FAMILY MEDICINE

## 2020-09-11 PROCEDURE — 85610 PROTHROMBIN TIME: CPT | Performed by: FAMILY MEDICINE

## 2020-09-11 RX ORDER — METOPROLOL SUCCINATE 50 MG/1
TABLET, EXTENDED RELEASE ORAL
Qty: 270 TABLET | Refills: 0 | Status: SHIPPED | OUTPATIENT
Start: 2020-09-11 | End: 2020-11-24

## 2020-09-11 NOTE — TELEPHONE ENCOUNTER
"Prescription approved per [facility] Refill Protocol.      Requested Prescriptions   Pending Prescriptions Disp Refills     metoprolol succinate ER (TOPROL-XL) 50 MG 24 hr tablet [Pharmacy Med Name: Metoprolol Succinate ER 50 MG Oral Tablet Extended Release 24 Hour] 270 tablet 0     Sig: TAKE 1 & 1/2 (ONE & ONE-HALF) TABLETS BY MOUTH TWICE DAILY       Beta-Blockers Protocol Passed - 9/10/2020  9:20 AM        Passed - Blood pressure under 140/90 in past 12 months     BP Readings from Last 3 Encounters:   08/10/20 138/78   08/07/20 137/61   07/26/20 128/76                 Passed - Patient is age 6 or older        Passed - Recent (12 mo) or future (30 days) visit within the authorizing provider's specialty     Patient has had an office visit with the authorizing provider or a provider within the authorizing providers department within the previous 12 mos or has a future within next 30 days. See \"Patient Info\" tab in inbasket, or \"Choose Columns\" in Meds & Orders section of the refill encounter.              Passed - Medication is active on med list           Velma JONES RN, BSN      "

## 2020-09-11 NOTE — PROGRESS NOTES
ANTICOAGULATION MANAGEMENT     Patient Name:  Antoine Russo  Date:  2020    ASSESSMENT /SUBJECTIVE:    Today's INR result of 2.50 is therapeutic. Goal INR of 2.0-3.0      Previous INR: Supratherapeutic 3.20    Additional findings: No consent to communicate on file, left voicemail for patient to call [facility] or review detailed [employer] message.      PLAN:    Sent detailed [employer] message regarding INR result and instructed:     Warfarin Dosing Instructions: Continue your current warfarin dose 2.5mg Tues/Thurs/Sat; 1.25mg all other days     Instructed patient to follow up no later than: 2 weeks  Lab visit scheduled    Education provided: Please call back if any changes to your diet, medications or how you've been taking warfarin       Instructed to call the Anticoagulation Clinic for any changes, questions or concerns. (#[phone])      OBJECTIVE:  Recent labs: (last 7 days)     20  0837   INR 2.50*         INR assessment THER    Recheck INR In: 2 WEEKS    INR Location Clinic      Anticoagulation Summary  As of 2020    INR goal:   2.0-3.0   TTR:   43.2 % (12 mo)   INR used for dosin.50 (2020)   Warfarin maintenance plan:   2.5 mg (2.5 mg x 1) every Tue, Thu, Sat; 1.25 mg (2.5 mg x 0.5) all other days   Full warfarin instructions:   2.5 mg every Tue, Thu, Sat; 1.25 mg all other days   Weekly warfarin total:   12.5 mg   No change documented:   Adeline Duke RN   Plan last modified:   Adeline Duke RN (2020)   Next INR check:   2020   Priority:   High   Target end date:   Indefinite    Indications    Chronic atrial fibrillation (H) [I48.20]  Long term current use of anticoagulant therapy [Z79.01]             Anticoagulation Episode Summary     INR check location:       Preferred lab:       Send INR reminders to:   [facility]    Comments:   * 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 No bandaid. 2-3 light beers daily.       Anticoagulation Care  Providers     Provider Role Specialty Phone number    Katie Martino MD Referring Family Practice [phone]

## 2020-09-25 ENCOUNTER — ANTICOAGULATION THERAPY VISIT (OUTPATIENT)
Dept: ANTICOAGULATION | Facility: CLINIC | Age: 75
End: 2020-09-25

## 2020-09-25 DIAGNOSIS — I48.20 CHRONIC ATRIAL FIBRILLATION (H): ICD-10-CM

## 2020-09-25 DIAGNOSIS — Z79.01 LONG TERM CURRENT USE OF ANTICOAGULANT THERAPY: ICD-10-CM

## 2020-09-25 LAB
CAPILLARY BLOOD COLLECTION: NORMAL
INR PPP: 2.4 (ref 0.86–1.14)

## 2020-09-25 PROCEDURE — 36416 COLLJ CAPILLARY BLOOD SPEC: CPT | Performed by: FAMILY MEDICINE

## 2020-09-25 PROCEDURE — 85610 PROTHROMBIN TIME: CPT | Performed by: FAMILY MEDICINE

## 2020-09-25 NOTE — PROGRESS NOTES
ANTICOAGULATION MANAGEMENT     Patient Name:  Antoine Russo  Date:  2020    ASSESSMENT /SUBJECTIVE:    Today's INR result of 2.40 is therapeutic. Goal INR of 2.0-3.0      Warfarin dose taken: Warfarin taken as instructed    Diet: No new diet changes affecting INR    Medication changes/ interactions: No new medications/supplements affecting INR    Previous INR: Therapeutic 2.50    S/S of bleeding or thromboembolism: No    New injury or illness: No    Upcoming surgery, procedure or cardioversion: No    Additional findings: None      PLAN:    Telephone call with Antoine regarding INR result and instructed:     Warfarin Dosing Instructions: Continue your current warfarin dose 2.5mg Tues/Thurs/Sat; 1.25mg all other days     [employer] message also sent per patient request.    Instructed patient to follow up no later than: 3 weeks  Lab visit scheduled    Education provided: Contact the anticoagulation clinic with any changes, questions or concerns at #[phone]       Poli verbalizes understanding and agrees to warfarin dosing plan.    Instructed to call the Anticoagulation Clinic for any changes, questions or concerns. (#[phone])        Adeline Dkue RN CACP       OBJECTIVE:  Recent labs: (last 7 days)     20  0839   INR 2.40*         INR assessment THER    Recheck INR In: 3 WEEKS    INR Location Clinic      Anticoagulation Summary  As of 2020    INR goal:   2.0-3.0   TTR:   44.9 % (12 mo)   INR used for dosin.40 (2020)   Warfarin maintenance plan:   2.5 mg (2.5 mg x 1) every Tue, Thu, Sat; 1.25 mg (2.5 mg x 0.5) all other days   Full warfarin instructions:   2.5 mg every Tue, Thu, Sat; 1.25 mg all other days   Weekly warfarin total:   12.5 mg   No change documented:   Adeline Duke RN   Plan last modified:   Adeline Duke RN (2020)   Next INR check:   10/16/2020   Priority:   Maintenance   Target end date:   Indefinite    Indications    Chronic atrial  fibrillation (H) [I48.20]  Long term current use of anticoagulant therapy [Z79.01]             Anticoagulation Episode Summary     INR check location:       Preferred lab:       Send INR reminders to:   [facility]    Comments:   *       Anticoagulation Care Providers     Provider Role Specialty Phone number    Katie Martino MD [facility] [phone]

## 2020-10-02 DIAGNOSIS — I48.20 CHRONIC ATRIAL FIBRILLATION (H): ICD-10-CM

## 2020-10-02 RX ORDER — WARFARIN SODIUM 2.5 MG/1
TABLET ORAL
Qty: 70 TABLET | Refills: 0 | Status: SHIPPED | OUTPATIENT
Start: 2020-10-02 | End: 2020-11-24

## 2020-10-03 ENCOUNTER — HOSPITAL ENCOUNTER (OUTPATIENT)
Facility: CLINIC | Age: 75
Setting detail: OBSERVATION
Discharge: HOME | End: 2020-10-05
Attending: PHYSICIAN ASSISTANT | Admitting: INTERNAL MEDICINE
Payer: COMMERCIAL

## 2020-10-03 ENCOUNTER — APPOINTMENT (OUTPATIENT)
Dept: CT IMAGING | Facility: CLINIC | Age: 75
End: 2020-10-03
Attending: PHYSICIAN ASSISTANT
Payer: COMMERCIAL

## 2020-10-03 DIAGNOSIS — K85.90 ACUTE PANCREATITIS: ICD-10-CM

## 2020-10-03 DIAGNOSIS — K29.00 ACUTE GASTRITIS: ICD-10-CM

## 2020-10-03 DIAGNOSIS — K29.00 ACUTE GASTRITIS WITHOUT HEMORRHAGE, UNSPECIFIED GASTRITIS TYPE: Primary | ICD-10-CM

## 2020-10-03 DIAGNOSIS — Z20.828 EXPOSURE TO SARS-ASSOCIATED CORONAVIRUS: ICD-10-CM

## 2020-10-03 DIAGNOSIS — K85.80 OTHER ACUTE PANCREATITIS WITHOUT INFECTION OR NECROSIS: ICD-10-CM

## 2020-10-03 LAB
ALBUMIN SERPL-MCNC: 3.5 G/DL (ref 3.4–5)
ALP SERPL-CCNC: 58 U/L (ref 40–150)
ALT SERPL W P-5'-P-CCNC: 20 U/L (ref 0–70)
ANION GAP SERPL CALCULATED.3IONS-SCNC: 5 MMOL/L (ref 3–14)
ANISOCYTOSIS BLD QL SMEAR: SLIGHT
AST SERPL W P-5'-P-CCNC: 15 U/L (ref 0–45)
BASOPHILS # BLD AUTO: 0 10E9/L (ref 0–0.2)
BASOPHILS NFR BLD AUTO: 0 %
BILIRUB SERPL-MCNC: 0.7 MG/DL (ref 0.2–1.3)
BUN SERPL-MCNC: 14 MG/DL (ref 7–30)
CALCIUM SERPL-MCNC: 10 MG/DL (ref 8.5–10.1)
CHLORIDE SERPL-SCNC: 103 MMOL/L (ref 94–109)
CO2 SERPL-SCNC: 28 MMOL/L (ref 20–32)
CREAT SERPL-MCNC: 0.99 MG/DL (ref 0.66–1.25)
DIFFERENTIAL METHOD BLD: ABNORMAL
EOSINOPHIL # BLD AUTO: 0 10E9/L (ref 0–0.7)
EOSINOPHIL NFR BLD AUTO: 0 %
ERYTHROCYTE [DISTWIDTH] IN BLOOD BY AUTOMATED COUNT: 17.1 % (ref 10–15)
GFR SERPL CREATININE-BSD FRML MDRD: 75 ML/MIN/{1.73_M2}
GLUCOSE SERPL-MCNC: 152 MG/DL (ref 70–99)
HBA1C MFR BLD: 7.1 % (ref 0–5.6)
HCT VFR BLD AUTO: 46.4 % (ref 40–53)
HGB BLD-MCNC: 15 G/DL (ref 13.3–17.7)
INR PPP: 1.76 (ref 0.86–1.14)
LIPASE SERPL-CCNC: 1072 U/L (ref 73–393)
LYMPHOCYTES # BLD AUTO: 2.8 10E9/L (ref 0.8–5.3)
LYMPHOCYTES NFR BLD AUTO: 19 %
MCH RBC QN AUTO: 31.6 PG (ref 26.5–33)
MCHC RBC AUTO-ENTMCNC: 32.3 G/DL (ref 31.5–36.5)
MCV RBC AUTO: 98 FL (ref 78–100)
MONOCYTES # BLD AUTO: 1.3 10E9/L (ref 0–1.3)
MONOCYTES NFR BLD AUTO: 9 %
NEUTROPHILS # BLD AUTO: 10.4 10E9/L (ref 1.6–8.3)
NEUTROPHILS NFR BLD AUTO: 72 %
PLATELET # BLD AUTO: 214 10E9/L (ref 150–450)
PLATELET # BLD EST: ABNORMAL 10*3/UL
POTASSIUM SERPL-SCNC: 4.3 MMOL/L (ref 3.4–5.3)
PROT SERPL-MCNC: 7.8 G/DL (ref 6.8–8.8)
RBC # BLD AUTO: 4.74 10E12/L (ref 4.4–5.9)
SODIUM SERPL-SCNC: 136 MMOL/L (ref 133–144)
TROPONIN I SERPL-MCNC: <0.015 UG/L (ref 0–0.04)
WBC # BLD AUTO: 14.5 10E9/L (ref 4–11)

## 2020-10-03 PROCEDURE — 250N000011 HC RX IP 250 OP 636: Performed by: PHYSICIAN ASSISTANT

## 2020-10-03 PROCEDURE — 93005 ELECTROCARDIOGRAM TRACING: CPT | Performed by: PHYSICIAN ASSISTANT

## 2020-10-03 PROCEDURE — 250N000009 HC RX 250: Performed by: PHYSICIAN ASSISTANT

## 2020-10-03 PROCEDURE — C9803 HOPD COVID-19 SPEC COLLECT: HCPCS

## 2020-10-03 PROCEDURE — 96372 THER/PROPH/DIAG INJ SC/IM: CPT | Performed by: FAMILY MEDICINE

## 2020-10-03 PROCEDURE — 93010 ELECTROCARDIOGRAM REPORT: CPT | Performed by: PHYSICIAN ASSISTANT

## 2020-10-03 PROCEDURE — 96374 THER/PROPH/DIAG INJ IV PUSH: CPT | Performed by: PHYSICIAN ASSISTANT

## 2020-10-03 PROCEDURE — 99285 EMERGENCY DEPT VISIT HI MDM: CPT | Mod: 25 | Performed by: PHYSICIAN ASSISTANT

## 2020-10-03 PROCEDURE — 120N000001 HC R&B MED SURG/OB

## 2020-10-03 PROCEDURE — 96375 TX/PRO/DX INJ NEW DRUG ADDON: CPT | Performed by: PHYSICIAN ASSISTANT

## 2020-10-03 PROCEDURE — 250N000012 HC RX MED GY IP 250 OP 636 PS 637: Performed by: FAMILY MEDICINE

## 2020-10-03 PROCEDURE — 83690 ASSAY OF LIPASE: CPT | Performed by: PHYSICIAN ASSISTANT

## 2020-10-03 PROCEDURE — 83036 HEMOGLOBIN GLYCOSYLATED A1C: CPT | Performed by: FAMILY MEDICINE

## 2020-10-03 PROCEDURE — 258N000003 HC RX IP 258 OP 636: Performed by: PHYSICIAN ASSISTANT

## 2020-10-03 PROCEDURE — 85610 PROTHROMBIN TIME: CPT | Performed by: PHYSICIAN ASSISTANT

## 2020-10-03 PROCEDURE — U0003 INFECTIOUS AGENT DETECTION BY NUCLEIC ACID (DNA OR RNA); SEVERE ACUTE RESPIRATORY SYNDROME CORONAVIRUS 2 (SARS-COV-2) (CORONAVIRUS DISEASE [COVID-19]), AMPLIFIED PROBE TECHNIQUE, MAKING USE OF HIGH THROUGHPUT TECHNOLOGIES AS DESCRIBED BY CMS-2020-01-R: HCPCS | Performed by: PHYSICIAN ASSISTANT

## 2020-10-03 PROCEDURE — C9113 INJ PANTOPRAZOLE SODIUM, VIA: HCPCS | Performed by: PHYSICIAN ASSISTANT

## 2020-10-03 PROCEDURE — 80053 COMPREHEN METABOLIC PANEL: CPT | Performed by: PHYSICIAN ASSISTANT

## 2020-10-03 PROCEDURE — 99223 1ST HOSP IP/OBS HIGH 75: CPT | Mod: AI | Performed by: FAMILY MEDICINE

## 2020-10-03 PROCEDURE — 84484 ASSAY OF TROPONIN QUANT: CPT | Performed by: PHYSICIAN ASSISTANT

## 2020-10-03 PROCEDURE — 96361 HYDRATE IV INFUSION ADD-ON: CPT

## 2020-10-03 PROCEDURE — 96376 TX/PRO/DX INJ SAME DRUG ADON: CPT | Performed by: PHYSICIAN ASSISTANT

## 2020-10-03 PROCEDURE — 250N000013 HC RX MED GY IP 250 OP 250 PS 637: Performed by: FAMILY MEDICINE

## 2020-10-03 PROCEDURE — 74177 CT ABD & PELVIS W/CONTRAST: CPT

## 2020-10-03 PROCEDURE — 96361 HYDRATE IV INFUSION ADD-ON: CPT | Mod: 59 | Performed by: PHYSICIAN ASSISTANT

## 2020-10-03 PROCEDURE — 85025 COMPLETE CBC W/AUTO DIFF WBC: CPT | Performed by: PHYSICIAN ASSISTANT

## 2020-10-03 RX ORDER — LORAZEPAM 1 MG/1
1-2 TABLET ORAL EVERY 30 MIN PRN
Status: DISCONTINUED | OUTPATIENT
Start: 2020-10-03 | End: 2020-10-05 | Stop reason: HOSPADM

## 2020-10-03 RX ORDER — IOPAMIDOL 755 MG/ML
97 INJECTION, SOLUTION INTRAVASCULAR ONCE
Status: COMPLETED | OUTPATIENT
Start: 2020-10-03 | End: 2020-10-03

## 2020-10-03 RX ORDER — LISINOPRIL 5 MG/1
5 TABLET ORAL DAILY
Status: DISCONTINUED | OUTPATIENT
Start: 2020-10-04 | End: 2020-10-05 | Stop reason: HOSPADM

## 2020-10-03 RX ORDER — SODIUM CHLORIDE, SODIUM LACTATE, POTASSIUM CHLORIDE, CALCIUM CHLORIDE 600; 310; 30; 20 MG/100ML; MG/100ML; MG/100ML; MG/100ML
INJECTION, SOLUTION INTRAVENOUS CONTINUOUS
Status: DISCONTINUED | OUTPATIENT
Start: 2020-10-03 | End: 2020-10-04

## 2020-10-03 RX ORDER — SODIUM CHLORIDE, SODIUM LACTATE, POTASSIUM CHLORIDE, CALCIUM CHLORIDE 600; 310; 30; 20 MG/100ML; MG/100ML; MG/100ML; MG/100ML
INJECTION, SOLUTION INTRAVENOUS CONTINUOUS
Status: DISCONTINUED | OUTPATIENT
Start: 2020-10-03 | End: 2020-10-05

## 2020-10-03 RX ORDER — MAGNESIUM SULFATE HEPTAHYDRATE 40 MG/ML
4 INJECTION, SOLUTION INTRAVENOUS EVERY 4 HOURS PRN
Status: DISCONTINUED | OUTPATIENT
Start: 2020-10-03 | End: 2020-10-05 | Stop reason: HOSPADM

## 2020-10-03 RX ORDER — NALOXONE HYDROCHLORIDE 0.4 MG/ML
.1-.4 INJECTION, SOLUTION INTRAMUSCULAR; INTRAVENOUS; SUBCUTANEOUS
Status: DISCONTINUED | OUTPATIENT
Start: 2020-10-03 | End: 2020-10-05 | Stop reason: HOSPADM

## 2020-10-03 RX ORDER — FOLIC ACID 1 MG/1
1 TABLET ORAL DAILY
Status: DISCONTINUED | OUTPATIENT
Start: 2020-10-04 | End: 2020-10-05 | Stop reason: HOSPADM

## 2020-10-03 RX ORDER — POTASSIUM CHLORIDE 1500 MG/1
20-40 TABLET, EXTENDED RELEASE ORAL
Status: DISCONTINUED | OUTPATIENT
Start: 2020-10-03 | End: 2020-10-05 | Stop reason: HOSPADM

## 2020-10-03 RX ORDER — OXYCODONE HYDROCHLORIDE 5 MG/1
5-10 TABLET ORAL
Status: DISCONTINUED | OUTPATIENT
Start: 2020-10-03 | End: 2020-10-05 | Stop reason: HOSPADM

## 2020-10-03 RX ORDER — WARFARIN SODIUM 2.5 MG/1
2.5 TABLET ORAL
Status: COMPLETED | OUTPATIENT
Start: 2020-10-03 | End: 2020-10-03

## 2020-10-03 RX ORDER — LIDOCAINE 40 MG/G
CREAM TOPICAL
Status: DISCONTINUED | OUTPATIENT
Start: 2020-10-03 | End: 2020-10-05 | Stop reason: HOSPADM

## 2020-10-03 RX ORDER — LORAZEPAM 2 MG/ML
1-2 INJECTION INTRAMUSCULAR EVERY 30 MIN PRN
Status: DISCONTINUED | OUTPATIENT
Start: 2020-10-03 | End: 2020-10-05 | Stop reason: HOSPADM

## 2020-10-03 RX ORDER — TAMSULOSIN HYDROCHLORIDE 0.4 MG/1
0.4 CAPSULE ORAL DAILY
Status: DISCONTINUED | OUTPATIENT
Start: 2020-10-04 | End: 2020-10-05 | Stop reason: HOSPADM

## 2020-10-03 RX ORDER — NICOTINE POLACRILEX 4 MG
15-30 LOZENGE BUCCAL
Status: DISCONTINUED | OUTPATIENT
Start: 2020-10-03 | End: 2020-10-05 | Stop reason: HOSPADM

## 2020-10-03 RX ORDER — PROCHLORPERAZINE MALEATE 5 MG
5 TABLET ORAL EVERY 6 HOURS PRN
Status: DISCONTINUED | OUTPATIENT
Start: 2020-10-03 | End: 2020-10-05 | Stop reason: HOSPADM

## 2020-10-03 RX ORDER — MULTIPLE VITAMINS W/ MINERALS TAB 9MG-400MCG
1 TAB ORAL DAILY
Status: DISCONTINUED | OUTPATIENT
Start: 2020-10-04 | End: 2020-10-05 | Stop reason: HOSPADM

## 2020-10-03 RX ORDER — HYDROMORPHONE HYDROCHLORIDE 1 MG/ML
.3-.5 INJECTION, SOLUTION INTRAMUSCULAR; INTRAVENOUS; SUBCUTANEOUS
Status: DISCONTINUED | OUTPATIENT
Start: 2020-10-03 | End: 2020-10-05 | Stop reason: HOSPADM

## 2020-10-03 RX ORDER — ACETAMINOPHEN 325 MG/1
650 TABLET ORAL EVERY 4 HOURS PRN
Status: DISCONTINUED | OUTPATIENT
Start: 2020-10-03 | End: 2020-10-05 | Stop reason: HOSPADM

## 2020-10-03 RX ORDER — POTASSIUM CHLORIDE 29.8 MG/ML
20 INJECTION INTRAVENOUS
Status: DISCONTINUED | OUTPATIENT
Start: 2020-10-03 | End: 2020-10-03

## 2020-10-03 RX ORDER — HYDROMORPHONE HYDROCHLORIDE 1 MG/ML
0.5 INJECTION, SOLUTION INTRAMUSCULAR; INTRAVENOUS; SUBCUTANEOUS
Status: DISCONTINUED | OUTPATIENT
Start: 2020-10-03 | End: 2020-10-03 | Stop reason: DRUGHIGH

## 2020-10-03 RX ORDER — POTASSIUM CL/LIDO/0.9 % NACL 10MEQ/0.1L
10 INTRAVENOUS SOLUTION, PIGGYBACK (ML) INTRAVENOUS
Status: DISCONTINUED | OUTPATIENT
Start: 2020-10-03 | End: 2020-10-05 | Stop reason: HOSPADM

## 2020-10-03 RX ORDER — ROSUVASTATIN CALCIUM 5 MG/1
10 TABLET, COATED ORAL DAILY
Status: DISCONTINUED | OUTPATIENT
Start: 2020-10-04 | End: 2020-10-05 | Stop reason: HOSPADM

## 2020-10-03 RX ORDER — POTASSIUM CHLORIDE 7.45 MG/ML
10 INJECTION INTRAVENOUS
Status: DISCONTINUED | OUTPATIENT
Start: 2020-10-03 | End: 2020-10-05 | Stop reason: HOSPADM

## 2020-10-03 RX ORDER — ONDANSETRON 2 MG/ML
4 INJECTION INTRAMUSCULAR; INTRAVENOUS EVERY 6 HOURS PRN
Status: DISCONTINUED | OUTPATIENT
Start: 2020-10-03 | End: 2020-10-05 | Stop reason: HOSPADM

## 2020-10-03 RX ORDER — NALOXONE HYDROCHLORIDE 0.4 MG/ML
.1-.4 INJECTION, SOLUTION INTRAMUSCULAR; INTRAVENOUS; SUBCUTANEOUS
Status: DISCONTINUED | OUTPATIENT
Start: 2020-10-03 | End: 2020-10-03

## 2020-10-03 RX ORDER — PROCHLORPERAZINE 25 MG
12.5 SUPPOSITORY, RECTAL RECTAL EVERY 12 HOURS PRN
Status: DISCONTINUED | OUTPATIENT
Start: 2020-10-03 | End: 2020-10-05 | Stop reason: HOSPADM

## 2020-10-03 RX ORDER — ONDANSETRON 4 MG/1
4 TABLET, ORALLY DISINTEGRATING ORAL EVERY 6 HOURS PRN
Status: DISCONTINUED | OUTPATIENT
Start: 2020-10-03 | End: 2020-10-03

## 2020-10-03 RX ORDER — ONDANSETRON 2 MG/ML
4 INJECTION INTRAMUSCULAR; INTRAVENOUS EVERY 30 MIN PRN
Status: DISCONTINUED | OUTPATIENT
Start: 2020-10-03 | End: 2020-10-03 | Stop reason: DRUGHIGH

## 2020-10-03 RX ORDER — ONDANSETRON 2 MG/ML
4 INJECTION INTRAMUSCULAR; INTRAVENOUS EVERY 6 HOURS PRN
Status: DISCONTINUED | OUTPATIENT
Start: 2020-10-03 | End: 2020-10-03

## 2020-10-03 RX ORDER — POTASSIUM CHLORIDE 1.5 G/1.58G
20-40 POWDER, FOR SOLUTION ORAL
Status: DISCONTINUED | OUTPATIENT
Start: 2020-10-03 | End: 2020-10-05 | Stop reason: HOSPADM

## 2020-10-03 RX ORDER — LANOLIN ALCOHOL/MO/W.PET/CERES
100 CREAM (GRAM) TOPICAL DAILY
Status: DISCONTINUED | OUTPATIENT
Start: 2020-10-03 | End: 2020-10-05 | Stop reason: HOSPADM

## 2020-10-03 RX ORDER — DEXTROSE MONOHYDRATE 25 G/50ML
25-50 INJECTION, SOLUTION INTRAVENOUS
Status: DISCONTINUED | OUTPATIENT
Start: 2020-10-03 | End: 2020-10-05 | Stop reason: HOSPADM

## 2020-10-03 RX ORDER — ONDANSETRON 4 MG/1
4 TABLET, ORALLY DISINTEGRATING ORAL EVERY 6 HOURS PRN
Status: DISCONTINUED | OUTPATIENT
Start: 2020-10-03 | End: 2020-10-05 | Stop reason: HOSPADM

## 2020-10-03 RX ADMIN — ONDANSETRON 4 MG: 2 INJECTION INTRAMUSCULAR; INTRAVENOUS at 16:51

## 2020-10-03 RX ADMIN — SODIUM CHLORIDE, POTASSIUM CHLORIDE, SODIUM LACTATE AND CALCIUM CHLORIDE 1000 ML: 600; 310; 30; 20 INJECTION, SOLUTION INTRAVENOUS at 19:16

## 2020-10-03 RX ADMIN — IOPAMIDOL 97 ML: 755 INJECTION, SOLUTION INTRAVENOUS at 18:51

## 2020-10-03 RX ADMIN — SODIUM CHLORIDE, POTASSIUM CHLORIDE, SODIUM LACTATE AND CALCIUM CHLORIDE: 600; 310; 30; 20 INJECTION, SOLUTION INTRAVENOUS at 23:12

## 2020-10-03 RX ADMIN — THIAMINE HCL TAB 100 MG 100 MG: 100 TAB at 23:13

## 2020-10-03 RX ADMIN — INSULIN ASPART 1 UNITS: 100 INJECTION, SOLUTION INTRAVENOUS; SUBCUTANEOUS at 23:19

## 2020-10-03 RX ADMIN — PANTOPRAZOLE SODIUM 40 MG: 40 INJECTION, POWDER, FOR SOLUTION INTRAVENOUS at 20:06

## 2020-10-03 RX ADMIN — HYDROMORPHONE HYDROCHLORIDE 0.5 MG: 1 INJECTION, SOLUTION INTRAMUSCULAR; INTRAVENOUS; SUBCUTANEOUS at 16:50

## 2020-10-03 RX ADMIN — HYDROMORPHONE HYDROCHLORIDE 0.5 MG: 1 INJECTION, SOLUTION INTRAMUSCULAR; INTRAVENOUS; SUBCUTANEOUS at 19:17

## 2020-10-03 RX ADMIN — WARFARIN SODIUM 2.5 MG: 2.5 TABLET ORAL at 23:13

## 2020-10-03 RX ADMIN — ONDANSETRON 4 MG: 2 INJECTION INTRAMUSCULAR; INTRAVENOUS at 19:16

## 2020-10-03 RX ADMIN — SODIUM CHLORIDE 64 ML: 9 INJECTION, SOLUTION INTRAVENOUS at 18:52

## 2020-10-03 RX ADMIN — SODIUM CHLORIDE, POTASSIUM CHLORIDE, SODIUM LACTATE AND CALCIUM CHLORIDE 1000 ML: 600; 310; 30; 20 INJECTION, SOLUTION INTRAVENOUS at 16:49

## 2020-10-03 ASSESSMENT — ENCOUNTER SYMPTOMS
SHORTNESS OF BREATH: 0
RESPIRATORY NEGATIVE: 1
VOMITING: 1
CHILLS: 0
NAUSEA: 1
CONSTITUTIONAL NEGATIVE: 1
MUSCULOSKELETAL NEGATIVE: 1
COUGH: 0
CARDIOVASCULAR NEGATIVE: 1
ABDOMINAL PAIN: 1
FEVER: 0

## 2020-10-03 ASSESSMENT — MIFFLIN-ST. JEOR
SCORE: 1671.25
SCORE: 1663.44

## 2020-10-03 NOTE — ED PROVIDER NOTES
"  History     Chief Complaint   Patient presents with     Abdominal Pain     HPI  Antoine Russo is a 74 year old male with history of pancreatitis, alcohol dependence, chronic atrial fibrillation (on Coumadin), type 2 diabetes mellitus, coronary artery disease, hypertension who presents with complaints of epigastric and left upper quadrant abdominal pain has been progressive over the past 2 days.  Patient also complains of nausea and states he had an episode of emesis this morning.  Patient has history of pancreatitis and states these symptoms feel the same.  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  He continues to drink beer \"occasionally.\"   His last beer was a couple days ago.  Patient states he has been unable to eat or drink much due to his pain and nausea but did take his morning medications with coffee this morning.  Patient denies any hematemesis.  He denies any changes in bowel habits.  Denies fevers, chills, cough, diarrhea, constipation, chest pain, shortness of breath, or urinary symptoms.  Patient reports having history of a bowel resection due to C. difficile colitis.  Pt states he has been told that his pancreatitis is likely alcohol-related.  Most recent abdominal ultrasound in 2019 showed a normal gallbladder.       Below is copied from pt's most recent GI visit last year in which she saw Dr. Beasley at [facility] on 7/8/2019:  \"He is a 73-year-old who had recurrent acute pancreatitis and a peripancreatic small fluid collection after distal pancreatectomy some years ago that was initially complicated by pancreatic duct leak.  In March we did combined EUS/ERCP, aspirated the cyst and then placed a pancreatic biliary " "sphincterotomy.  Interestingly he got readmitted with severe pancreatitis with peripancreatic necrosis that resolved slowly and got bubbles in it transiently, a stent.  He is actually doing very well now.  He is eating, on pancreatic enzymes, regained some of his weight, but plateaued and having no pain issues.  He has not had an alcoholic drink since November, which no doubt was a contributing factor.  He quit smoking 12 years ago. Readmitted in May w another attack of pancreatitis, without necrosis, but with dramatic peripancreatic fluid.  Now totally asymptomatic. Did not take Creon due to cost. No loose stools.  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 Since no symptoms in last 6 weeks, would not pursue now. However, if and when he has another episode, would have him go to [facility] for hospitalization, and then once resolved repeat secretin MRCP down here to assess for recurrent leak. Then consider more aggressive ERCP if suggestive. Pt understands. No reason to take Creon at present.\"        Allergies:  Allergies   Allergen Reactions     Nkda [No Known Drug Allergies]        Problem List:    Patient Active Problem List    Diagnosis Date Noted     Anticipated difficulty with intubation 05/23/2017     Priority: High     Class: Chronic     Difficult two hands mask ventilation, intubated multiple times asleep with video laryngoscope. H/o tongue cancer surgery.        Acute gastritis 10/03/2020     Priority: Medium     Peripheral polyneuropathy 05/12/2020     Priority: Medium     Chronic low back pain with sciatica, sciatica laterality unspecified, unspecified back pain laterality 05/12/2020     Priority: Medium     Alcohol dependence, uncomplicated (H) 02/04/2020     Priority: Medium     Acute right-sided low back pain with left-sided sciatica 02/04/2020     Priority: Medium     Spinal stenosis " of lumbar region, unspecified whether neurogenic claudication present 02/04/2020     Priority: Medium     Pancreatic pseudocyst 04/18/2019     Priority: Medium     Acute pancreatitis 10/21/2018     Priority: Medium     Long term current use of anticoagulant therapy 04/05/2018     Priority: Medium     Recurrent pancreatitis 03/30/2018     Priority: Medium     Chronic atrial fibrillation (H) 01/23/2018     Priority: Medium     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 Please refer if Care Coordination services are needed.    Presenting Problem Signs and Symptoms Treatment Plan   Questions or concerns   during clinic hours   I will call my clinic directly:  [address] [address]  [phone].   Questions or concerns outside clinic hours   I will call the 24 hour nurse line at   [phone] or [facility].   Need to schedule an appointment   I will call the 24 hour scheduling team at [phone] or my clinic directly at [phone].   Same day treatment     I will call my clinic first, nurse line if after hours, urgent care and express care if needed.   Clinic care coordination services (regular clinic hours)   I will call a clinic care coordinator directly:     Shelia Emanuel RN [facility]  [phone]    Brianna Cristobal SW:    [phone]    Or call my clinic at [phone] and ask to speak with care coordination.   Crisis Services: Behavioral or  Mental Health  [facility] 24 Hour Phone Line  [phone]    [facility] 24 Hour Crisis Services  [phone]    [facility] ([facility] Network [phone]    [facility]   [phone]     Emergency treatment -- Immediately    [facility]All 911                Clostridium difficile enterocolitis 12/18/2012     Priority: Medium     Groin fluid collection 12/15/2012     Priority: Medium     CT 12/12- New (since 5/11) collection measuring at least 2.3 cm in diameter and 6.5 cm in length within the right inguinal canal. 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 Gerson Ravi and collekapil at the  in 2012  NAD 2017  CT scan of the neck every 2-3 years.  - Thyroid labs yearly.  - Carotid ultrasound in three to four years to evaluate for stenosis.       Hyperlipidemia LDL goal <100 10/31/2010     Priority: Medium     CAD (coronary artery disease) 05/26/2009     Priority: Medium     Stress testing 2009 showed inferior wall ischemia.  Cath preformed; identified moderate CAD with stenosis of 40-50% in LAD and RCA.  No stents were placed.  Echo in 01/2018 (done while in Afib with rates of 100-110); EF of 55-60%.  No RWMA.       GERD (gastroesophageal reflux disease) 05/26/2009     Priority: Medium     Hypertrophy of breast 09/25/2007     Priority: Medium     PERS HX TOBACCO USE - quit in 11/06 with chantix 03/15/2007     Priority: Medium     Hypertension, benign essential, goal below 140/90 11/07/2005     Priority: Medium     Patient has only fair bp control and with family history of diabetes will all ace if lab indicated  also has bph and may op for psa and not digital exam next yr        Pain in joint, shoulder region 11/07/2005     Priority: Medium     Hypertrophy of prostate without urinary obstruction 11/07/2005     Priority: Medium     Problem list name updated by automated process. 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 difficile colitis      Colon polyp      Colon polyp 8/26/2011     Coronary artery disease      Diabetes mellitus (H)      Diverticulitis      Diverticulitis of colon 7/17/2007     Hypertension      Leukocytosis 4/17/2019     Malignant neoplasm (H)      Noninfectious ileitis      Recurrent pancreatitis        Past Surgical History:    Past Surgical History:   Procedure Laterality Date     BREAST SURGERY  2008    right breast mass benign     COLONOSCOPY      multiple polyps removed     COLONOSCOPY  8/24/2011    Procedure:COMBINED COLONOSCOPY, REMOVE TUMOR/POLYP/LESION BY SNARE; Surgeon:MILEY ARBOLEDA; Location:[facility]     COLONOSCOPY  12/17/2012    Procedure: COLONOSCOPY;;  Surgeon: Leon Maurer MD;  [facility]     COLONOSCOPY  12/18/2012    Procedure: COLONOSCOPY;;  Surgeon: Leon Maurer MD;  [facility]     DENERVATION OF SPERMATIC CORD MICROSURGICAL Left 5/23/2017    Procedure: DENERVATION OF SPERMATIC CORD MICROSURGICAL;;  Surgeon: Marcio Aggarwal MD;  [facility]     DISSECTION RADICAL NECK BILATERAL  8/2/2012    Procedure: DISSECTION RADICAL NECK BILATERAL;;  Surgeon: Yung Alvares MD;  [facility]     ENDOSCOPIC RETROGRADE CHOLANGIOPANCREATOGRAM N/A 5/10/2016    Procedure: COMBINED ENDOSCOPIC RETROGRADE CHOLANGIOPANCREATOGRAPHY, PLACE TUBE/STENT;  Surgeon: Yovanny Beasley MD;  [facility]     ENDOSCOPIC  RETROGRADE CHOLANGIOPANCREATOGRAM N/A 3/29/2018    Procedure: ENDOSCOPIC RETROGRADE CHOLANGIOPANCREATOGRAM;  Endoscopic Retrograde Cholangiopancreatogram, Endoscopic Ultrasound, Biliary Sphincterotomy, Biliary and Pancreatic Stent Placement;  Surgeon: Yovanny Beasley MD;  [facility]     ENDOSCOPIC ULTRASOUND UPPER GASTROINTESTINAL TRACT (GI) N/A 2/3/2016    Procedure: ENDOSCOPIC ULTRASOUND, ESOPHAGOSCOPY / UPPER GASTROINTESTINAL TRACT (GI);  Surgeon: Grabiel Plata MD;  [facility]     ENDOSCOPIC ULTRASOUND UPPER GASTROINTESTINAL TRACT (GI) N/A 3/29/2018    Procedure: ENDOSCOPIC ULTRASOUND, ESOPHAGOSCOPY / UPPER GASTROINTESTINAL TRACT (GI);;  Surgeon: Grabiel Plata MD;  [facility]     ESOPHAGOSCOPY, GASTROSCOPY, DUODENOSCOPY (EGD), COMBINED N/A 2/3/2016    Procedure: COMBINED ENDOSCOPIC ULTRASOUND, ESOPHAGOSCOPY, GASTROSCOPY, DUODENOSCOPY (EGD), FINE NEEDLE ASPIRATE/BIOPSY;  Surgeon: Grabiel Plata MD;  [facility]     ESOPHAGOSCOPY, GASTROSCOPY, DUODENOSCOPY (EGD), COMBINED N/A 6/8/2016    Procedure: COMBINED ESOPHAGOSCOPY, GASTROSCOPY, DUODENOSCOPY (EGD), REMOVE FOREIGN BODY;  Surgeon: Yovanny Beasley MD;  [facility]     ESOPHAGOSCOPY, GASTROSCOPY, DUODENOSCOPY (EGD), COMBINED N/A 4/17/2018    Procedure: COMBINED ESOPHAGOSCOPY, GASTROSCOPY, DUODENOSCOPY (EGD), REMOVE FOREIGN BODY;  EGD with stent removal;  Surgeon: Grabiel Plata MD;  [facility]     EXCISE LESION INTRAORAL  6/14/2012    Procedure: EXCISE LESION INTRAORAL;  Wide Local Excision Floor of Mouth, Direct Laryngoscopy, Bilateral Rochester's Marsuplization, Split Thickness Skin Graft from right Thigh  Latex Safe;  Surgeon: Gerson Ravi MD;  [facility]     EXCISE LESION INTRAORAL  8/2/2012    Procedure: EXCISE LESION INTRAORAL;  Floor of Mouth Resection, Bilateral Selective Radical Neck Dissection, Tracheostomy, Left Radial Forearm  Free Flap with Alloderm, Nasogastric Feeding Tube  Placement,    * Latex Safe*;  Surgeon: Gerson Ravi MD;  [facility]     EXCISE LESION INTRAORAL  2012    Procedure: EXCISE LESION INTRAORAL;  takedown of oral flap;  Surgeon: Yung Alvares MD;  [facility]     GRAFT FREE VASCULARIZED (LOCATION)  2012    Procedure: GRAFT FREE VASCULARIZED (LOCATION);;  Surgeon: Yung Alvares MD;  [facility]     GRAFT SKIN SPLIT THICKNESS FROM EXTREMITY  2012    Procedure: GRAFT SKIN SPLIT THICKNESS FROM EXTREMITY;;  Surgeon: Gerson Ravi MD;  [facility]     LAPAROSCOPIC ILEOSTOMY TAKEDOWN  2013    Procedure: LAPAROSCOPIC ILEOSTOMY TAKEDOWN;  Laparoscopic Closure of Enterostomy, Guerda's Type with IleoRectal Anastomosis ;  Surgeon: Grabiel Riddle MD;  [facility]     LAPAROTOMY EXPLORATORY  2012    Procedure: LAPAROTOMY EXPLORATORY;  Exploratory Laparotomy, total abdominal colectomy, ileostomy formation;  Surgeon: Miquel Cannon MD;  [facility]     LARYNGOSCOPY  2012    Procedure: LARYNGOSCOPY;;  Surgeon: Gerson Ravi MD;  [facility]     ORTHOPEDIC SURGERY      ganglian cyst left ankle     PANCREATECTOMY, SPLENECTOMY N/A 3/10/2016    Procedure: PANCREATECTOMY, SPLENECTOMY;  Surgeon: Nael Abel MD;  [facility]     SHOULDER SURGERY  ,     2006- right rotator cuff,  bone spur on left. Dr. Hdez     VARICOCELECTOMY Left 2017    Procedure: VARICOCELECTOMY;  Left Varicocele Repair, Denervation of Left Testis;  Surgeon: Marcio Aggarwal MD;  [facility]       Family History:    Family History   Problem Relation Age of Onset     Diabetes Sister         onset age 50     Alzheimer Disease Mother          80     Alzheimer Disease Father          85     Diabetes Other         nephew type 1     Diabetes Other      Aneurysm Sister      Anesthesia Reaction No family hx of      Colon Cancer No family hx of      Colon Polyps No family hx of      Crohn's Disease No family hx  "of      Ulcerative Colitis No family hx of        Social History:  Marital Status:   [2]  Social History     Tobacco Use     Smoking status: Former Smoker     Packs/day: 1.00     Years: 40.00     Pack years: 40.00     Types: Cigarettes     Quit date: 2006     Years since quittin.8     Smokeless tobacco: Never Used   Substance Use Topics     Alcohol use: Not Currently     Comment: \"1 beer on Thanksgiving day\"     Drug use: Yes     Comment: 3 beers daily        Medications:    No current outpatient medications on file.        Review of Systems   Constitutional: Negative.  Negative for chills and fever.   Respiratory: Negative.  Negative for cough and shortness of breath.    Cardiovascular: Negative.  Negative for chest pain.   Gastrointestinal: Positive for abdominal pain, nausea and vomiting.   Genitourinary: Negative.    Musculoskeletal: Negative.    All other systems reviewed and are negative.      Physical Exam   BP: (!) 184/94  Pulse: 96  Temp: 98.2  F (36.8  C)  Resp: 20  Height: 177.8 cm (5' 10\")  Weight: 90.7 kg (200 lb)  SpO2: 94 %      Physical Exam  Constitutional:       General: He is not in acute distress.     Appearance: He is well-developed. He is not ill-appearing, toxic-appearing or diaphoretic.   HENT:      Head: Normocephalic and atraumatic.      Right Ear: Tympanic membrane, ear canal and external ear normal.      Left Ear: Tympanic membrane, ear canal and external ear normal.      Nose: Nose normal. No rhinorrhea.      Mouth/Throat:      Pharynx: No oropharyngeal exudate or posterior oropharyngeal erythema.   Eyes:      Conjunctiva/sclera: Conjunctivae normal.      Pupils: Pupils are equal, round, and reactive to light.   Neck:      Musculoskeletal: Normal range of motion and neck supple.   Cardiovascular:      Rate and Rhythm: Normal rate and regular rhythm.      Heart sounds: Normal heart sounds.   Pulmonary:      Effort: Pulmonary effort is normal.      Breath sounds: Normal " breath sounds.   Abdominal:      General: There is no distension.      Palpations: Abdomen is soft.      Tenderness: There is abdominal tenderness in the epigastric area and left upper quadrant. There is no guarding or rebound.   Musculoskeletal: Normal range of motion.   Skin:     General: Skin is warm and dry.   Neurological:      Mental Status: He is alert and oriented to person, place, and time.         ED Course        Procedures               EKG Interpretation:      Interpreted by Luisa Mendez PA-C and Dr. Benton  Symptoms at time of EKG: Epigastric abdominal pain   Rhythm: Normal sinus   Rate: Normal  Axis: Normal  Ectopy: None  Conduction: Normal  ST Segments/ T Waves: Non-specific ST-T wave changes  Q Waves: None  Comparison to prior: Unchanged from 8/4/2020    Clinical Impression: No acute changes      Results for orders placed or performed during the hospital encounter of 10/03/20 (from the past 24 hour(s))   CBC with platelets differential   Result Value Ref Range    WBC 14.5 (H) 4.0 - 11.0 10e9/L    RBC Count 4.74 4.4 - 5.9 10e12/L    Hemoglobin 15.0 13.3 - 17.7 g/dL    Hematocrit 46.4 40.0 - 53.0 %    MCV 98 78 - 100 fl    MCH 31.6 26.5 - 33.0 pg    MCHC 32.3 31.5 - 36.5 g/dL    RDW 17.1 (H) 10.0 - 15.0 %    Platelet Count 214 150 - 450 10e9/L    Diff Method Manual Differential     % Neutrophils 72.0 %    % Lymphocytes 19.0 %    % Monocytes 9.0 %    % Eosinophils 0.0 %    % Basophils 0.0 %    Absolute Neutrophil 10.4 (H) 1.6 - 8.3 10e9/L    Absolute Lymphocytes 2.8 0.8 - 5.3 10e9/L    Absolute Monocytes 1.3 0.0 - 1.3 10e9/L    Absolute Eosinophils 0.0 0.0 - 0.7 10e9/L    Absolute Basophils 0.0 0.0 - 0.2 10e9/L    Anisocytosis Slight     Platelet Estimate       Automated count confirmed.  Platelet morphology is normal.   Comprehensive metabolic panel   Result Value Ref Range    Sodium 136 133 - 144 mmol/L    Potassium 4.3 3.4 - 5.3 mmol/L    Chloride 103 94 - 109 mmol/L    Carbon Dioxide 28 20 -  32 mmol/L    Anion Gap 5 3 - 14 mmol/L    Glucose 152 (H) 70 - 99 mg/dL    Urea Nitrogen 14 7 - 30 mg/dL    Creatinine 0.99 0.66 - 1.25 mg/dL    GFR Estimate 75 >60 mL/min/[1.73_m2]    GFR Estimate If Black 87 >60 mL/min/[1.73_m2]    Calcium 10.0 8.5 - 10.1 mg/dL    Bilirubin Total 0.7 0.2 - 1.3 mg/dL    Albumin 3.5 3.4 - 5.0 g/dL    Protein Total 7.8 6.8 - 8.8 g/dL    Alkaline Phosphatase 58 40 - 150 U/L    ALT 20 0 - 70 U/L    AST 15 0 - 45 U/L   Lipase   Result Value Ref Range    Lipase 1,072 (H) 73 - 393 U/L   Troponin I   Result Value Ref Range    Troponin I ES <0.015 0.000 - 0.045 ug/L   INR   Result Value Ref Range    INR 1.76 (H) 0.86 - 1.14   CT Abdomen Pelvis w Contrast    Narrative    EXAM: CT ABDOMEN PELVIS W CONTRAST  LOCATION: [facility]  DATE/TIME: 10/3/2020 6:50 PM    INDICATION: Epigastric and left upper quadrant abdominal pain.  COMPARISON: Multiple prior studies with most recent from 05/22/2019.  TECHNIQUE: CT scan of the abdomen and pelvis was performed following injection of IV contrast. Multiplanar reformats were obtained. Dose reduction techniques were used.  CONTRAST: 97 ml [id number].    FINDINGS:   LOWER CHEST: Minimal linear scarring involving both lung bases. No pleural fluid.    HEPATOBILIARY: Mild diffuse fatty infiltration of the liver. No calcified gallstones or biliary dilatation.    PANCREAS: Resection of the distal aspect of the pancreas. More proximal aspect of the pancreas demonstrates no evidence for ductal dilatation or overt abnormality.    SPLEEN: Splenectomy.    ADRENAL GLANDS: No significant nodules.    KIDNEYS/BLADDER: Symmetric contrast enhancement to both kidneys. Bilateral renal cysts. No further follow-up necessary. Mild cortical scarring and thinning. No urinary collecting system dilatation or calculi. Bladder unremarkable. Minimal prostate   enlargement.    BOWEL: There remains marked wall thickening involving the stomach diffusely. The wall thickening  near the gastric cardia and fundus has increased since the most recent available study along with wall thickening in the mid gastric body. Distal gastric   wall thickening near the antrum and extending into the first portion of the duodenum has decreased. Overall inflammatory change surrounding the stomach is slightly decreased but there still remains mild underlying inflammatory edema. Duodenum   unremarkable. No small bowel dilatation or wall thickening. Near-complete resection of the colon. Postoperative changes of ileocolonic anastomosis. Distal colon and rectum unremarkable.    LYMPH NODES: No lymphadenopathy.    VASCULATURE: Normal caliber abdominal aorta. Marked atherosclerotic vascular calcification.    MUSCULOSKELETAL: Postoperative changes of lower lumbar laminectomy. Minimal degenerative hypertrophic changes in the spine. Mild soft tissue edema over the laminectomy site.      Impression    IMPRESSION:   1.  There remains marked wall thickening involving the stomach diffusely. Wall thickening near the gastric cardia and fundus has increased since the most recent available study along with wall thickening in the mid gastric body. Distal gastric wall   thickening at the level of the antrum and into the first portion of the duodenum, however, is decreased. Overall inflammatory change in the perigastric fat planes. Findings may represent persistent gastritis. Consider follow-up EGD if clinically   necessary.    2.  No other areas of small bowel dilatation or obstruction. Postoperative changes of near-total colectomy with ileocolonic anastomosis in the deep pelvis.    3.  Postoperative changes of a lower lumbar laminectomy with minimal edema in the soft tissues. No fluid collections.    4.  Distal pancreatic resection and splenectomy.     5.  Mild diffuse fatty infiltration of the liver.       Medications   lactated ringers BOLUS 1,000 mL (0 mLs Intravenous Stopped 10/3/20 2127)     Followed by   lactated  ringers BOLUS 1,000 mL (0 mLs Intravenous Stopped 10/3/20 2849)     Followed by   lactated ringers infusion (has no administration in time range)   lactated ringers infusion (has no administration in time range)   acetaminophen (TYLENOL) tablet 650 mg (has no administration in time range)   naloxone (NARCAN) injection 0.1-0.4 mg (has no administration in time range)   oxyCODONE (ROXICODONE) tablet 5-10 mg (has no administration in time range)   naloxone (NARCAN) injection 0.1-0.4 mg (has no administration in time range)   HYDROmorphone (PF) (DILAUDID) injection 0.3-0.5 mg (has no administration in time range)   ondansetron (ZOFRAN-ODT) ODT tab 4 mg (has no administration in time range)     Or   ondansetron (ZOFRAN) injection 4 mg (has no administration in time range)   prochlorperazine (COMPAZINE) injection 5 mg (has no administration in time range)     Or   prochlorperazine (COMPAZINE) tablet 5 mg (has no administration in time range)     Or   prochlorperazine (COMPAZINE) suppository 12.5 mg (has no administration in time range)   tamsulosin (FLOMAX) capsule 0.4 mg (has no administration in time range)   rosuvastatin (CRESTOR) tablet 10 mg (has no administration in time range)   lisinopril (ZESTRIL) tablet 5 mg (has no administration in time range)   lidocaine 1 % 0.1-1 mL (has no administration in time range)   lidocaine (LMX4) kit (has no administration in time range)   sodium chloride (PF) 0.9% PF flush 3 mL (has no administration in time range)   sodium chloride (PF) 0.9% PF flush 3 mL (has no administration in time range)   naloxone (NARCAN) injection 0.1-0.4 mg (has no administration in time range)   melatonin tablet 1 mg (has no administration in time range)   Patient is already receiving anticoagulation with heparin, enoxaparin (LOVENOX), warfarin (COUMADIN)  or other anticoagulant medication (has no administration in time range)   ondansetron (ZOFRAN-ODT) ODT tab 4 mg (has no administration in time range)      Or   ondansetron (ZOFRAN) injection 4 mg (has no administration in time range)   thiamine (B-1) tablet 100 mg (has no administration in time range)   folic acid (FOLVITE) tablet 1 mg (has no administration in time range)   multivitamin w/minerals (THERA-VIT-M) tablet 1 tablet (has no administration in time range)   potassium chloride ER (KLOR-CON M) CR tablet 20-40 mEq (has no administration in time range)   potassium chloride (KLOR-CON) Packet 20-40 mEq (has no administration in time range)   potassium chloride 10 mEq in 100 mL sterile water intermittent infusion (premix) (has no administration in time range)   potassium chloride 10 mEq in 100 mL intermittent infusion with 10 mg lidocaine (has no administration in time range)   magnesium sulfate 4 g in 100 mL sterile water (premade) (has no administration in time range)   potassium phosphate 15 mmol in D5W 250 mL intermittent infusion (has no administration in time range)   potassium phosphate 20 mmol in D5W 500 mL intermittent infusion (has no administration in time range)   potassium phosphate 25 mmol in D5W 500 mL intermittent infusion (has no administration in time range)   LORazepam (ATIVAN) tablet 1-2 mg (has no administration in time range)     Or   LORazepam (ATIVAN) injection 1-2 mg (has no administration in time range)   glucose gel 15-30 g (has no administration in time range)     Or   dextrose 50 % injection 25-50 mL (has no administration in time range)     Or   glucagon injection 1 mg (has no administration in time range)   insulin aspart (NovoLOG) injection (RAPID ACTING) (has no administration in time range)   Warfarin Therapy Reminder (Check START DATE - warfarin may be starting in the FUTURE) (has no administration in time range)   iopamidol (ISOVUE-370) solution 97 mL (97 mLs Intravenous Given 10/3/20 1851)   sodium chloride 0.9 % bag 500mL for CT scan flush use (64 mLs Intravenous Given 10/3/20 1852)   pantoprazole (PROTONIX) IV push  "injection 40 mg (40 mg Intravenous Given 10/3/20 2006)       Assessments & Plan (with Medical Decision Making)     Pt is a 74 year old male with history of pancreatitis, alcohol dependence, chronic atrial fibrillation (on Coumadin), type 2 diabetes mellitus, coronary artery disease, hypertension who presents with complaints of epigastric and left upper quadrant abdominal pain has been progressive over the past 2 days.  Patient also complains of nausea and states he had an episode of emesis this morning.  Patient has history of pancreatitis and states these symptoms feel the same.  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  He continues to drink beer \"occasionally.\"   His last beer was a couple days ago.     Pt is afebrile on arrival.  Exam as above.  EKG appears unchanged from most recent.  Troponin is undetectable.  Lipase is elevated to 1,072.  INR is subtherapeutic at 1.76.  White count is 14,500.  Comprehensive metabolic panel is unremarkable.  CT of the abdomen and pelvis was obtained and does not show overt inflammation about his pancreas but does note remaining marked wall thickening involving the stomach diffusely consistent with persistent gastritis.  Follow-up EGD recommended if indicated.  Discussed results with patient.  Patient was given IV fluids, Zofran, and Dilaudid.  Patient was started on IV Protonix given finding of gastritis on CT.  Given patient's elevated lipase with his epigastric and left upper quadrant pain in the context of a prior history of pancreatitis, will plan on admitting him to the hospital for further management of his symptoms including fluids and pain control.  He has not had any episodes of emesis in " the department.  Discussed patient's case with hospitalist on-call, Dr. Barrientos, who requests I speak with GI specialist through the [facility] regarding patient's case given patient's complicated history with pancreatitis.  I therefore spoke with on-call gastroenterologist through [facility], Dr. Guadarrama, who is part of the pancreas biliary team, and he reviewed patient's CT scans and chart and recommends conservative therapy in the hospital at [facility] involving fluids and pain management.  He states there does not appear to be anything to be done endoscopically at this point.  He recommends patient follow-up with his gastroenterologist, Dr. Beasley, as recommended previously for follow-up repeat secretin MRCP once his symptoms resolve to assess for recurrent leak.  Patient would also benefit from EGD as well.  Dr. Guadarrama states that if patient does not seem to respond clinically in the next couple days, consider transfer to the [facility].  I called our hospitalist Dr. Barrientos back and updated him with this plan, and he accepts the admission and care of the patient.  Patient has remained vitally stable and was admitted in stable condition.  Asymptomatic COVID-19 swab was obtained.    Discussed pt's case with the ED physician on staff (Dr. Bryan).  He also evaluated patient and was involved in the care of the patient throughout his ED visit including assessment, diagnosis, treatment, and plan of the patient.    I have reviewed the nursing notes.    I have reviewed the findings, diagnosis, plan with the patient.     ED to Inpatient Handoff:    Discussed with Dr. Barrientos  Patient accepted for Inpatient Stay  Pending studies include None  Code Status: Full Code           Current Discharge Medication List          Final diagnoses:   Acute pancreatitis   Acute gastritis       10/3/2020   [facility] EMERGENCY DEPT      Disclaimer:  This note consists of symbols derived from keyboarding,  dictation and/or voice recognition software.  As a result, there may be errors in the script that have gone undetected.  Please consider this when interpreting information found in this chart.     Luisa Mendez PA-C  10/03/20 [id number]

## 2020-10-04 LAB
ALBUMIN SERPL-MCNC: 2.7 G/DL (ref 3.4–5)
ALP SERPL-CCNC: 49 U/L (ref 40–150)
ALT SERPL W P-5'-P-CCNC: 16 U/L (ref 0–70)
ANION GAP SERPL CALCULATED.3IONS-SCNC: 4 MMOL/L (ref 3–14)
AST SERPL W P-5'-P-CCNC: 13 U/L (ref 0–45)
BILIRUB SERPL-MCNC: 0.8 MG/DL (ref 0.2–1.3)
BUN SERPL-MCNC: 11 MG/DL (ref 7–30)
CALCIUM SERPL-MCNC: 8.5 MG/DL (ref 8.5–10.1)
CHLORIDE SERPL-SCNC: 103 MMOL/L (ref 94–109)
CO2 SERPL-SCNC: 29 MMOL/L (ref 20–32)
CREAT SERPL-MCNC: 0.91 MG/DL (ref 0.66–1.25)
ERYTHROCYTE [DISTWIDTH] IN BLOOD BY AUTOMATED COUNT: 16.8 % (ref 10–15)
GFR SERPL CREATININE-BSD FRML MDRD: 84 ML/MIN/{1.73_M2}
GLUCOSE BLDC GLUCOMTR-MCNC: 130 MG/DL (ref 70–99)
GLUCOSE BLDC GLUCOMTR-MCNC: 133 MG/DL (ref 70–99)
GLUCOSE SERPL-MCNC: 157 MG/DL (ref 70–99)
HCT VFR BLD AUTO: 40.8 % (ref 40–53)
HGB BLD-MCNC: 13.4 G/DL (ref 13.3–17.7)
INR PPP: 1.84 (ref 0.86–1.14)
MAGNESIUM SERPL-MCNC: 1.5 MG/DL (ref 1.6–2.3)
MAGNESIUM SERPL-MCNC: 2.4 MG/DL (ref 1.6–2.3)
MCH RBC QN AUTO: 31.3 PG (ref 26.5–33)
MCHC RBC AUTO-ENTMCNC: 32.8 G/DL (ref 31.5–36.5)
MCV RBC AUTO: 95 FL (ref 78–100)
PHOSPHATE SERPL-MCNC: 3 MG/DL (ref 2.5–4.5)
PLATELET # BLD AUTO: 174 10E9/L (ref 150–450)
POTASSIUM SERPL-SCNC: 4.2 MMOL/L (ref 3.4–5.3)
PROT SERPL-MCNC: 6.5 G/DL (ref 6.8–8.8)
RBC # BLD AUTO: 4.28 10E12/L (ref 4.4–5.9)
SARS-COV-2 RNA SPEC QL NAA+PROBE: NOT DETECTED
SODIUM SERPL-SCNC: 136 MMOL/L (ref 133–144)
SPECIMEN SOURCE: NORMAL
WBC # BLD AUTO: 12.4 10E9/L (ref 4–11)

## 2020-10-04 PROCEDURE — 96361 HYDRATE IV INFUSION ADD-ON: CPT

## 2020-10-04 PROCEDURE — 99207 PR CDG-CODE CATEGORY CHANGED: CPT | Performed by: INTERNAL MEDICINE

## 2020-10-04 PROCEDURE — 258N000003 HC RX IP 258 OP 636: Performed by: PHYSICIAN ASSISTANT

## 2020-10-04 PROCEDURE — 96366 THER/PROPH/DIAG IV INF ADDON: CPT

## 2020-10-04 PROCEDURE — 80053 COMPREHEN METABOLIC PANEL: CPT | Performed by: FAMILY MEDICINE

## 2020-10-04 PROCEDURE — 250N000011 HC RX IP 250 OP 636: Performed by: PHYSICIAN ASSISTANT

## 2020-10-04 PROCEDURE — 96375 TX/PRO/DX INJ NEW DRUG ADDON: CPT

## 2020-10-04 PROCEDURE — 83735 ASSAY OF MAGNESIUM: CPT | Mod: 91 | Performed by: INTERNAL MEDICINE

## 2020-10-04 PROCEDURE — 83735 ASSAY OF MAGNESIUM: CPT | Performed by: FAMILY MEDICINE

## 2020-10-04 PROCEDURE — 96376 TX/PRO/DX INJ SAME DRUG ADON: CPT

## 2020-10-04 PROCEDURE — C9113 INJ PANTOPRAZOLE SODIUM, VIA: HCPCS | Performed by: FAMILY MEDICINE

## 2020-10-04 PROCEDURE — 96365 THER/PROPH/DIAG IV INF INIT: CPT

## 2020-10-04 PROCEDURE — 36415 COLL VENOUS BLD VENIPUNCTURE: CPT | Performed by: INTERNAL MEDICINE

## 2020-10-04 PROCEDURE — 85610 PROTHROMBIN TIME: CPT | Performed by: FAMILY MEDICINE

## 2020-10-04 PROCEDURE — 99225 PR SUBSEQUENT OBSERVATION CARE,LEVEL II: CPT | Performed by: INTERNAL MEDICINE

## 2020-10-04 PROCEDURE — 250N000013 HC RX MED GY IP 250 OP 250 PS 637: Performed by: INTERNAL MEDICINE

## 2020-10-04 PROCEDURE — 36415 COLL VENOUS BLD VENIPUNCTURE: CPT | Performed by: FAMILY MEDICINE

## 2020-10-04 PROCEDURE — 250N000011 HC RX IP 250 OP 636: Performed by: FAMILY MEDICINE

## 2020-10-04 PROCEDURE — 84100 ASSAY OF PHOSPHORUS: CPT | Performed by: FAMILY MEDICINE

## 2020-10-04 PROCEDURE — G0378 HOSPITAL OBSERVATION PER HR: HCPCS

## 2020-10-04 PROCEDURE — 250N000013 HC RX MED GY IP 250 OP 250 PS 637: Performed by: FAMILY MEDICINE

## 2020-10-04 PROCEDURE — 999N001017 HC STATISTIC GLUCOSE BY METER IP

## 2020-10-04 PROCEDURE — 85027 COMPLETE CBC AUTOMATED: CPT | Performed by: FAMILY MEDICINE

## 2020-10-04 RX ORDER — SODIUM CHLORIDE, SODIUM LACTATE, POTASSIUM CHLORIDE, CALCIUM CHLORIDE 600; 310; 30; 20 MG/100ML; MG/100ML; MG/100ML; MG/100ML
INJECTION, SOLUTION INTRAVENOUS CONTINUOUS
Status: DISCONTINUED | OUTPATIENT
Start: 2020-10-04 | End: 2020-10-04

## 2020-10-04 RX ADMIN — WARFARIN SODIUM 1.25 MG: 2.5 TABLET ORAL at 17:59

## 2020-10-04 RX ADMIN — INSULIN ASPART 1 UNITS: 100 INJECTION, SOLUTION INTRAVENOUS; SUBCUTANEOUS at 08:16

## 2020-10-04 RX ADMIN — HYDROMORPHONE HYDROCHLORIDE 0.5 MG: 1 INJECTION, SOLUTION INTRAMUSCULAR; INTRAVENOUS; SUBCUTANEOUS at 09:02

## 2020-10-04 RX ADMIN — METOPROLOL SUCCINATE 75 MG: 25 TABLET, EXTENDED RELEASE ORAL at 00:56

## 2020-10-04 RX ADMIN — FOLIC ACID 1 MG: 1 TABLET ORAL at 08:19

## 2020-10-04 RX ADMIN — PANTOPRAZOLE SODIUM 40 MG: 40 INJECTION, POWDER, LYOPHILIZED, FOR SOLUTION INTRAVENOUS at 08:24

## 2020-10-04 RX ADMIN — LISINOPRIL 5 MG: 5 TABLET ORAL at 08:19

## 2020-10-04 RX ADMIN — ONDANSETRON 4 MG: 2 INJECTION INTRAMUSCULAR; INTRAVENOUS at 08:21

## 2020-10-04 RX ADMIN — SODIUM CHLORIDE, POTASSIUM CHLORIDE, SODIUM LACTATE AND CALCIUM CHLORIDE: 600; 310; 30; 20 INJECTION, SOLUTION INTRAVENOUS at 18:03

## 2020-10-04 RX ADMIN — MAGNESIUM SULFATE 4 G: 4 INJECTION INTRAVENOUS at 08:59

## 2020-10-04 RX ADMIN — HYDROMORPHONE HYDROCHLORIDE 0.5 MG: 1 INJECTION, SOLUTION INTRAMUSCULAR; INTRAVENOUS; SUBCUTANEOUS at 15:31

## 2020-10-04 RX ADMIN — METOPROLOL SUCCINATE 75 MG: 25 TABLET, EXTENDED RELEASE ORAL at 20:01

## 2020-10-04 RX ADMIN — THIAMINE HCL TAB 100 MG 100 MG: 100 TAB at 08:19

## 2020-10-04 RX ADMIN — ROSUVASTATIN CALCIUM 10 MG: 5 TABLET, FILM COATED ORAL at 08:18

## 2020-10-04 RX ADMIN — HYDROMORPHONE HYDROCHLORIDE 0.5 MG: 1 INJECTION, SOLUTION INTRAMUSCULAR; INTRAVENOUS; SUBCUTANEOUS at 20:06

## 2020-10-04 RX ADMIN — INSULIN ASPART 1 UNITS: 100 INJECTION, SOLUTION INTRAVENOUS; SUBCUTANEOUS at 11:56

## 2020-10-04 RX ADMIN — METOPROLOL SUCCINATE 75 MG: 25 TABLET, EXTENDED RELEASE ORAL at 08:18

## 2020-10-04 RX ADMIN — SODIUM CHLORIDE, POTASSIUM CHLORIDE, SODIUM LACTATE AND CALCIUM CHLORIDE: 600; 310; 30; 20 INJECTION, SOLUTION INTRAVENOUS at 07:35

## 2020-10-04 RX ADMIN — HYDROMORPHONE HYDROCHLORIDE 0.5 MG: 1 INJECTION, SOLUTION INTRAMUSCULAR; INTRAVENOUS; SUBCUTANEOUS at 00:56

## 2020-10-04 RX ADMIN — PROCHLORPERAZINE EDISYLATE 5 MG: 5 INJECTION INTRAMUSCULAR; INTRAVENOUS at 11:48

## 2020-10-04 RX ADMIN — TAMSULOSIN HYDROCHLORIDE 0.4 MG: 0.4 CAPSULE ORAL at 08:19

## 2020-10-04 RX ADMIN — MULTIPLE VITAMINS W/ MINERALS TAB 1 TABLET: TAB at 08:20

## 2020-10-04 ASSESSMENT — ACTIVITIES OF DAILY LIVING (ADL)
ADLS_ACUITY_SCORE: 11

## 2020-10-04 ASSESSMENT — MIFFLIN-ST. JEOR: SCORE: 1671.25

## 2020-10-04 NOTE — PLAN OF CARE
Patient is alert and oriented x 4.  Patient has been medicated with dilaudid 0.5 mg IV x 2 throughout the day.  Intermittent nausea throughout day and was medicated with zofran 4 mg IV x 1 dose and compazine 5 mg IV x 1 dose.  Denies nausea at this time and tolerated a full cup of broth this afternoon.  Voiding well and reported a BM today.  Up independently to BATHROOM; declined to get up in chair today.  Encouraged activity.

## 2020-10-04 NOTE — PROGRESS NOTES
".[facility] ADMISSION NOTE    Patient admitted to room 2213 at approximately 2240 via cart from emergency room. Patient was accompanied by transport tech.     Verbal SBAR report received from [facility] prior to patient arrival.     Patient ambulated to bed independently. Patient alert and oriented X 3. Pain is controlled with current analgesics.  Medication(s) being used: narcotic analgesics including dilaudid. 0-10 Pain Scale: 6. Admission vital signs: Blood pressure (!) 151/75, pulse 89, temperature 97.9  F (36.6  C), temperature source Oral, resp. rate 16, height 1.778 m (5' 10\"), weight 91.5 kg (201 lb 11.5 oz), SpO2 93 %. Patient was oriented to plan of care, call light, bed controls, tv, telephone, bathroom and visiting hours.     Risk Assessment    The following safety risks were identified during admission: none. Yellow risk band applied: NO.     Skin Initial Assessment    This writer admitted this patient and completed a full skin assessment and Romain score in the Adult PCS flowsheet. Appropriate interventions initiated as needed.     Secondary skin check completed by N/A Patient declined \"I don't have any wounds\".         Education    Patient has a [place] to Observation order: Yes  Observation education completed and documented: Yes      Huy Anderson RN    "

## 2020-10-04 NOTE — H&P
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  He had pancreatic biliary sphincterotomy and aspiration of the pseudocyst in 03/20/2019  Patient has a history of recurrent pancreatitis.  Last episode was 08/04/2020  Patient presented for evaluation of worsening epigastric pain for 2 days   Lipase elevated at 1072.  Leukocytosis at 14.5  CT scan showed: marked wall thickening involving the stomach diffusely. Wall thickening near the gastric cardia and fundus has increased since the most recent available study along with wall thickening in the mid gastric body. Distal gastric wall   thickening at the level of the antrum and into the first portion of the duodenum, however, is decreased. Overall inflammatory change in the perigastric fat planes. Findings may represent persistent gastritis. No other areas of small bowel dilatation or obstruction. Postoperative changes of near-total colectomy with ileocolonic anastomosis in the deep pelvis.    Postoperative changes of a lower lumbar laminectomy with minimal edema in the soft tissues. No fluid collections. Distal pancreatic resection and splenectomy.   Patient was given IV fluids, Zofran and Dilaudid and started on IV Protonix given gastritis on CT scan  ED provider spoke with gastroenterologist at the [facility] who recommended  conservative therapy in the hospital at [facility] involving fluids and pain management.  Plan:  -NPO  -IV fluids  -Pain control  -If no improvement in the next couple days, consider transfer to the [place]  [facility]  Active Problems:        Acute gastritis  Patient might need EGD  Patient received IV Protonix in the ED  Continue IV Protonix    Hypertension, benign essential, goal below 140/90  Chronic stable problem  Resume home medication lisinopril and Toprol-XL    CAD (coronary artery disease)  Cath showed moderate coronary disease in 2009, no stents placed  Resume home Crestor 10 mg daily      Type 2 diabetes mellitus with diabetic polyneuropathy, without long-term current use of insulin (H)  Lab Results   Component Value Date    A1C 7.1 10/03/2020    A1C 6.3 05/12/2020    A1C 6.5 02/04/2020    A1C 9.0 10/17/2019    A1C 9.7 07/18/2019   Home medication Novolin 34 units in the morning and 32 units at night  Patient is n.p.o.  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  Continue to monitor      Chronic atrial fibrillation (H)  Resume home Crestor 10 mg    Long term current use of anticoagulant therapy        Alcohol dependence, uncomplicated (H)  CIWA protocol    Hypertrophy of prostate without urinary obstruction  Continue Flomax    Hyperlipidemia LDL goal <100  Resume home Crestor 10 mg daily       Diet:  NPO  DVT Prophylaxis: Warfarin  Chavez Catheter: not present  Code Status:   Full code   Lines: Peripheral IV line     Disposition Plan   Expected discharge: Tomorrow, recommended to prior living arrangement once adequate pain management/ tolerating PO medications and safe disposition plan/ TCU bed available.  Entered: Gita Barrientos MD 10/03/2020, 10:27 PM     Status: Patient is appropriate for Inpatient   Gita Barrientos MD        The patient's care was discussed with the Patient.    Primary Care Physician   Katie Martino [phone]    History is obtained from the patient,  and review of old records via the EMR.    History of Present Illness   Antoine LOPEZ  Rafaela is a 74 year old male with past medical history of recurrent pancreatitis, alcohol abuse, chronic A. fib on Coumadin, diabetes mellitus type 2, CAD and hypertension now presents on 10/3/2020 with worsening epigastric pain started 2 days ago.  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  Patient states he he came to the ED because could not tolerate the pain today.  He had one episode of nonbloody emesis.  Patient states his pain similar to previous pancreatitis episode.  Patient drinks alcohol regularly, last drink 3 days ago.  On arrival to the ED patient was afebrile.  Lipase elevated at 1072.  Leukocytosis at 14.5  CT scan showed: marked wall thickening involving the stomach diffusely. Wall thickening near the gastric cardia and fundus has increased since the most recent available study along with wall thickening in the mid gastric body. Distal gastric wall   thickening at the level of the antrum and into the first portion of the duodenum, however, is decreased. Overall inflammatory change in the perigastric fat planes. Findings may represent persistent gastritis. No other areas of small bowel dilatation or obstruction. Postoperative changes of near-total colectomy with ileocolonic anastomosis in the deep pelvis.    Postoperative changes of a lower lumbar laminectomy with minimal edema in the soft tissues. No fluid collections. Distal pancreatic resection and splenectomy.   Patient was given IV fluids, Zofran and Dilaudid and started on IV Protonix given gastritis on CT scan    Patient was interviewed and examined at bedside.  Patient states his pain is improved with pain medication his current pain is 3 out of 10.  Denies any chest pain, shortness of breath, cough, fever, chills, sick contact.  Patient denies any diarrhea or constipation or change in dietary habits    Review of Systems   The 10  point Review of Systems is negative other than noted in the HPI or here.     Past Medical History    Reviewed  Past Medical History:   Diagnosis Date     Acute kidney injury (H) 4/17/2019     Acute on chronic pancreatitis (H) 1/23/2018     Bacteriuria with pyuria 4/17/2019     C. difficile colitis      Colon polyp      Colon polyp 8/26/2011    Colonoscopy 8/2011-A sessile polyp was found in the cecum. The polyp was 6 mm in size. The polyp was removed with a hot snare. Resection and retrieval were complete. A sessile polyp was found in the proximal transverse colon. The polyp was 15 mm in size. The polyp was removed with a hot snare. Resection and retrieval were complete. A sessile polyp was found in the sigmoid colon. The polyp was 5 mm     Coronary artery disease      Diabetes mellitus (H)     type 2     Diverticulitis      Diverticulitis of colon 7/17/2007    Colitis on CT Scan 5/2011- [place] Colonoscopy 8/2011 Diverticulitis - Multiple small and large-mouthed diverticula were found in the mid sigmoid colon and at the hepatic flexure. There was narrowing of the colon in association with the diverticular opening. Nena-diverticular erythema was seen. There was evidence of an impacted diverticulum. 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     Noninfectious ileitis      Recurrent pancreatitis      Patient Active Problem List    Diagnosis Date Noted     Acute gastritis 10/03/2020     Priority: High     Acute pancreatitis 10/21/2018     Priority: High     Anticipated difficulty with intubation 05/23/2017     Priority: High     Class: Chronic     Difficult two hands mask ventilation, intubated multiple times asleep with video laryngoscope. H/o tongue cancer surgery.        Peripheral polyneuropathy 05/12/2020     Priority: Medium     Chronic low back pain with sciatica, sciatica laterality  unspecified, unspecified back pain laterality 05/12/2020     Priority: Medium     Alcohol dependence, uncomplicated (H) 02/04/2020     Priority: Medium     Acute right-sided low back pain with left-sided sciatica 02/04/2020     Priority: Medium     Spinal stenosis of lumbar region, unspecified whether neurogenic claudication present 02/04/2020     Priority: Medium     Pancreatic pseudocyst 04/18/2019     Priority: Medium     Long term current use of anticoagulant therapy 04/05/2018     Priority: Medium     Recurrent pancreatitis 03/30/2018     Priority: Medium     Chronic atrial fibrillation (H) 01/23/2018     Priority: Medium     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 Please refer if Care Coordination services are needed.    Presenting Problem Signs and Symptoms Treatment Plan   Questions or concerns   during clinic hours   I will call my clinic directly:  [address], [place] [address]  [phone].   Questions or concerns outside clinic hours   I will call the 24 hour nurse line at   [phone] or [facility].   Need to schedule an appointment   I will call the 24 hour scheduling team at [phone] or my clinic directly at [phone].   Same day treatment     I will call my clinic first, nurse line if after hours, urgent care and express care if needed.   Clinic care coordination services (regular clinic hours)   I will call a clinic care coordinator  directly:     Shelia Emanuel RN [facility]  [phone]    Brianna Cristobal, SW:    [phone]    Or call my clinic at [phone] and ask to speak with care coordination.   Crisis Services: Behavioral or Mental Health  [facility] 24 Hour Phone Line  [phone]    [facility] 24 Hour Crisis Services  [phone]    [facility] (Behavioral Health Providers) Network [phone]    [facility]   [phone]     Emergency treatment -- Immediately    [facility] 911                Clostridium difficile enterocolitis 12/18/2012     Priority: Medium     Groin fluid collection 12/15/2012     Priority: Medium     CT 12/12- New (since 5/11) collection measuring at least 2.3 cm in diameter and 6.5 cm in length within the right inguinal canal. 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 Gerson Ravi and colleguchi at the  in 2012  NAD 2017  CT scan of the neck every 2-3 years.  - Thyroid labs yearly.  - Carotid ultrasound in three to four years to evaluate for stenosis.       CAD (coronary artery disease) 05/26/2009     Priority: Medium     Stress testing 2009 showed inferior wall ischemia.  Cath preformed; identified moderate CAD with stenosis of 40-50% in LAD and RCA.  No stents were placed.  Echo in 01/2018 (done while in Afib with rates of 100-110); EF of 55-60%.  No RWMA.       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 Provider to review          Past Surgical History   Reviewed  Past Surgical History:   Procedure Laterality Date     BREAST SURGERY  2008    right breast mass benign     COLONOSCOPY      multiple polyps removed     COLONOSCOPY  8/24/2011    Procedure:COMBINED COLONOSCOPY, REMOVE TUMOR/POLYP/LESION BY SNARE; Surgeon:MILEY ARBOLEDA; Location:[facility]     COLONOSCOPY  12/17/2012    Procedure: COLONOSCOPY;;  Surgeon: Leon Maurer MD;  [facility]     COLONOSCOPY  12/18/2012    Procedure: COLONOSCOPY;;  Surgeon: Leon Maurer MD;  [facility]     DENERVATION OF SPERMATIC CORD MICROSURGICAL Left 5/23/2017    Procedure: DENERVATION OF SPERMATIC CORD MICROSURGICAL;;  Surgeon: Marcio Aggarwal MD;  [facility]     DISSECTION RADICAL NECK BILATERAL  8/2/2012    Procedure: DISSECTION RADICAL NECK BILATERAL;;  Surgeon: Yung Alvares MD;  [facility]     ENDOSCOPIC RETROGRADE CHOLANGIOPANCREATOGRAM N/A 5/10/2016    Procedure: COMBINED ENDOSCOPIC RETROGRADE CHOLANGIOPANCREATOGRAPHY, PLACE TUBE/STENT;  Surgeon: Yovanny Beasley MD;  [facility]     ENDOSCOPIC RETROGRADE CHOLANGIOPANCREATOGRAM N/A 3/29/2018    Procedure: ENDOSCOPIC RETROGRADE CHOLANGIOPANCREATOGRAM;  Endoscopic Retrograde Cholangiopancreatogram, Endoscopic Ultrasound, Biliary Sphincterotomy, Biliary and Pancreatic Stent Placement;  Surgeon: Yovanny Beasley MD;  [facility]      ENDOSCOPIC ULTRASOUND UPPER GASTROINTESTINAL TRACT (GI) N/A 2/3/2016    Procedure: ENDOSCOPIC ULTRASOUND, ESOPHAGOSCOPY / UPPER GASTROINTESTINAL TRACT (GI);  Surgeon: Grabiel Plata MD;  [facility]     ENDOSCOPIC ULTRASOUND UPPER GASTROINTESTINAL TRACT (GI) N/A 3/29/2018    Procedure: ENDOSCOPIC ULTRASOUND, ESOPHAGOSCOPY / UPPER GASTROINTESTINAL TRACT (GI);;  Surgeon: Grabiel Plata MD;  [facility]     ESOPHAGOSCOPY, GASTROSCOPY, DUODENOSCOPY (EGD), COMBINED N/A 2/3/2016    Procedure: COMBINED ENDOSCOPIC ULTRASOUND, ESOPHAGOSCOPY, GASTROSCOPY, DUODENOSCOPY (EGD), FINE NEEDLE ASPIRATE/BIOPSY;  Surgeon: Grabiel Plata MD;  [facility]     ESOPHAGOSCOPY, GASTROSCOPY, DUODENOSCOPY (EGD), COMBINED N/A 6/8/2016    Procedure: COMBINED ESOPHAGOSCOPY, GASTROSCOPY, DUODENOSCOPY (EGD), REMOVE FOREIGN BODY;  Surgeon: Yovanny Beasley MD;  [facility]     ESOPHAGOSCOPY, GASTROSCOPY, DUODENOSCOPY (EGD), COMBINED N/A 4/17/2018    Procedure: COMBINED ESOPHAGOSCOPY, GASTROSCOPY, DUODENOSCOPY (EGD), REMOVE FOREIGN BODY;  EGD with stent removal;  Surgeon: Grabiel Plata MD;  [facility]     EXCISE LESION INTRAORAL  6/14/2012    Procedure: EXCISE LESION INTRAORAL;  Wide Local Excision Floor of Mouth, Direct Laryngoscopy, Bilateral [place]'s Marsuplization, Split Thickness Skin Graft from right Thigh  Latex Safe;  Surgeon: Gerson Ravi MD;  [facility]     EXCISE LESION INTRAORAL  8/2/2012    Procedure: EXCISE LESION INTRAORAL;  Floor of Mouth Resection, Bilateral Selective Radical Neck Dissection, Tracheostomy, Left Radial Forearm  Free Flap with Alloderm, Nasogastric Feeding Tube Placement,    * Latex Safe*;  Surgeon: Gerson Ravi MD;  [facility]     EXCISE LESION INTRAORAL  12/11/2012    Procedure: EXCISE LESION INTRAORAL;  takedown of oral flap;  Surgeon: Yung Alvares MD;  [facility]     GRAFT FREE VASCULARIZED (LOCATION)  8/2/2012    Procedure: GRAFT FREE  VASCULARIZED (LOCATION);;  Surgeon: Yung Alvares MD;  [facility]     GRAFT SKIN SPLIT THICKNESS FROM EXTREMITY  2012    Procedure: GRAFT SKIN SPLIT THICKNESS FROM EXTREMITY;;  Surgeon: Gerson Ravi MD;  [facility]     LAPAROSCOPIC ILEOSTOMY TAKEDOWN  2013    Procedure: LAPAROSCOPIC ILEOSTOMY TAKEDOWN;  Laparoscopic Closure of Enterostomy, Guerda's Type with IleoRectal Anastomosis ;  Surgeon: Grabiel Riddle MD;  [facility]     LAPAROTOMY EXPLORATORY  2012    Procedure: LAPAROTOMY EXPLORATORY;  Exploratory Laparotomy, total abdominal colectomy, ileostomy formation;  Surgeon: Miquel Cannon MD;  [facility]     LARYNGOSCOPY  2012    Procedure: LARYNGOSCOPY;;  Surgeon: Gerson Ravi MD;  [facility]     ORTHOPEDIC SURGERY      ganglian cyst left ankle     PANCREATECTOMY, SPLENECTOMY N/A 3/10/2016    Procedure: PANCREATECTOMY, SPLENECTOMY;  Surgeon: Nael Abel MD;  [facility]     SHOULDER SURGERY  ,     2006- right rotator cuff,  bone spur on left. Dr. Hdez     VARICOCELECTOMY Left 2017    Procedure: VARICOCELECTOMY;  Left Varicocele Repair, Denervation of Left Testis;  Surgeon: Marcio Aggarwal MD;  [facility]        Prior to Admission Medications   Prior to Admission Medications   Prescriptions Last Dose Informant Patient Reported? Taking?   Continuous Blood Gluc  (FREESTYLE LISA 14 DAY READER) JOSE ALBERTO   No No   Si each as needed (use to scan blood sugars from sensor)   Continuous Blood Gluc Sensor (FREESTYLE LISA 14 DAY SENSOR) MISC   No No   Si each every 14 days   insulin isophane human (NOVOLIN N FLEXPEN RELION) 100 UNIT/ML injection   No No   Sig: Take 34 units of N in the am and 32 units in the pm.   insulin pen needle (BD LUIS U/F) 32G X 4 MM miscellaneous   No No   Sig: USE PEN NEEDLE ONCE DAILY AS DIRECTED   lisinopril (PRINIVIL/ZESTRIL) 5 MG tablet   No No   Sig: Take 1 tablet (5 mg) by mouth daily    metoprolol succinate ER (TOPROL-XL) 50 MG 24 hr tablet   No No   Sig: TAKE 1 & 1/2 (ONE & ONE-HALF) TABLETS BY MOUTH TWICE DAILY   multivitamin, therapeutic with minerals (THERA-VIT-M) TABS  Self No No   Sig: Take 1 tablet by mouth daily.   ondansetron (ZOFRAN ODT) 4 MG ODT tab   No No   Sig: Take 1 tablet (4 mg) by mouth every 8 hours as needed for nausea   rosuvastatin (CRESTOR) 10 MG tablet   No No   Sig: Take 1 tablet (10 mg) by mouth daily   tamsulosin (FLOMAX) 0.4 MG capsule   No No   Sig: Take 1 capsule (0.4 mg) by mouth daily   vitamin B-12 (CYANOCOBALAMIN) 100 MCG tablet  Self Yes No   Sig: Take 100 mcg by mouth daily   warfarin ANTICOAGULANT (COUMADIN) 2.5 MG tablet   No No   Sig: Take 2.5mg (1 tab) Tues/Thurs/Sat; 1.25mg (1/2 tab) all other days or as directed by the Anticoagulation Clinic      Facility-Administered Medications: None     Allergies   Allergies   Allergen Reactions     Nkda [No Known Drug Allergies]        Family History    Family History   Problem Relation Age of Onset     Diabetes Sister         onset age 50     Alzheimer Disease Mother          80     Alzheimer Disease Father          85     Diabetes Other         nephew type 1     Diabetes Other      Aneurysm Sister      Anesthesia Reaction No family hx of      Colon Cancer No family hx of      Colon Polyps No family hx of      Crohn's Disease No family hx of      Ulcerative Colitis No family hx of    Reviewed    Social History   Social History     Socioeconomic History     Marital status:      Spouse name: Not on file     Number of children: Not on file     Years of education: Not on file     Highest education level: Not on file   Occupational History     Occupation: [employer]     Comment: 20 hr/week monday-Thur 7-noon     Occupation: [employer]      Employer: [employer]     Occupation: RETIRED   Social Needs     Financial resource strain: Not on file     Food insecurity     Worry:  "Not on file     Inability: Not on file     Transportation needs     Medical: Not on file     Non-medical: Not on file   Tobacco Use     Smoking status: Former Smoker     Packs/day: 1.00     Years: 40.00     Pack years: 40.00     Types: Cigarettes     Quit date: 2006     Years since quittin.8     Smokeless tobacco: Never Used   Substance and Sexual Activity     Alcohol use: Not Currently     Comment: \"1 beer on Thanksgiving day\"     Drug use: Yes     Comment: 3 beers daily     Sexual activity: Yes     Partners: Female   Lifestyle     Physical activity     Days per week: Not on file     Minutes per session: Not on file     Stress: Not on file   Relationships     Social connections     Talks on phone: Not on file     Gets together: Not on file     Attends Mu-ism service: Not on file     Active member of club or organization: Not on file     Attends meetings of clubs or organizations: Not on file     Relationship status: Not on file     Intimate partner violence     Fear of current or ex partner: Not on file     Emotionally abused: Not on file     Physically abused: Not on file     Forced sexual activity: Not on file   Other Topics Concern      Service Yes     Comment: [employer] 6 years     Blood Transfusions No     Caffeine Concern Yes     Comment: 0-2 cups     Occupational Exposure No     Comment: retired     Hobby Hazards No     Sleep Concern No     Stress Concern No     Weight Concern No     Special Diet Yes     Comment: healthy     Back Care No     Exercise Yes     Bike Helmet Not Asked     Comment: N/A     Seat Belt Yes     Self-Exams Yes     Parent/sibling w/ CABG, MI or angioplasty before 65F 55M? No   Social History Narrative    Son lives in [place] with 2 grandchildren 19 and 6   Reviewed    Physical Exam   BP (!) 173/84   Pulse 81   Temp 98.2  F (36.8  C) (Temporal)   Resp 20   Ht 1.778 m (5' 10\")   Wt 90.7 kg (200 lb)   SpO2 95%   BMI 28.70 kg/m       Weight: 200 lbs 0 oz Body " mass index is 28.7 kg/m .     Constitutional: Alert, oriented, cooperative, no apparent distress, appears nontoxic  Eyes: Eyes are clear, pupils are reactive.  HENT:  No evidence of cranial trauma.  Cardiovascular: Regular rate and rhythm, normal S1 and S2, and no murmur noted. JVP is normal. Good peripheral pulses in wrists bilaterally. No lower extremity edema.  Respiratory: Clear to auscultation bilaterally.   GI: Slight tenderness over the LUQ, hyperactive bowel sounds, Soft abdomen , no guarding  Genitourinary: Deferred  Musculoskeletal: Normal muscle bulk and tone.  Skin: Warm and dry, no rashes.   Neurologic: Neck supple. Cranial nerves are grossly intact.  is symmetric.     Data   Data reviewed today:   Recent Labs   Lab 10/03/20  1647   WBC 14.5*   HGB 15.0   MCV 98      INR 1.76*      POTASSIUM 4.3   CHLORIDE 103   CO2 28   BUN 14   CR 0.99   ANIONGAP 5   NILSON 10.0   *   ALBUMIN 3.5   PROTTOTAL 7.8   BILITOTAL 0.7   ALKPHOS 58   ALT 20   AST 15   LIPASE 1,072*   TROPI <0.015       Recent Results (from the past 24 hour(s))   CT Abdomen Pelvis w Contrast    Narrative    EXAM: CT ABDOMEN PELVIS W CONTRAST  LOCATION: [facility]  DATE/TIME: 10/3/2020 6:50 PM    INDICATION: Epigastric and left upper quadrant abdominal pain.  COMPARISON: Multiple prior studies with most recent from 05/22/2019.  TECHNIQUE: CT scan of the abdomen and pelvis was performed following injection of IV contrast. Multiplanar reformats were obtained. Dose reduction techniques were used.  CONTRAST: 97 ml [id number].    FINDINGS:   LOWER CHEST: Minimal linear scarring involving both lung bases. No pleural fluid.    HEPATOBILIARY: Mild diffuse fatty infiltration of the liver. No calcified gallstones or biliary dilatation.    PANCREAS: Resection of the distal aspect of the pancreas. More proximal aspect of the pancreas demonstrates no evidence for ductal dilatation or overt abnormality.    SPLEEN:  Splenectomy.    ADRENAL GLANDS: No significant nodules.    KIDNEYS/BLADDER: Symmetric contrast enhancement to both kidneys. Bilateral renal cysts. No further follow-up necessary. Mild cortical scarring and thinning. No urinary collecting system dilatation or calculi. Bladder unremarkable. Minimal prostate   enlargement.    BOWEL: There remains marked wall thickening involving the stomach diffusely. The wall thickening near the gastric cardia and fundus has increased since the most recent available study along with wall thickening in the mid gastric body. Distal gastric   wall thickening near the antrum and extending into the first portion of the duodenum has decreased. Overall inflammatory change surrounding the stomach is slightly decreased but there still remains mild underlying inflammatory edema. Duodenum   unremarkable. No small bowel dilatation or wall thickening. Near-complete resection of the colon. Postoperative changes of ileocolonic anastomosis. Distal colon and rectum unremarkable.    LYMPH NODES: No lymphadenopathy.    VASCULATURE: Normal caliber abdominal aorta. Marked atherosclerotic vascular calcification.    MUSCULOSKELETAL: Postoperative changes of lower lumbar laminectomy. Minimal degenerative hypertrophic changes in the spine. Mild soft tissue edema over the laminectomy site.      Impression    IMPRESSION:   1.  There remains marked wall thickening involving the stomach diffusely. Wall thickening near the gastric cardia and fundus has increased since the most recent available study along with wall thickening in the mid gastric body. Distal gastric wall   thickening at the level of the antrum and into the first portion of the duodenum, however, is decreased. Overall inflammatory change in the perigastric fat planes. Findings may represent persistent gastritis. Consider follow-up EGD if clinically   necessary.    2.  No other areas of small bowel dilatation or obstruction. Postoperative changes of  near-total colectomy with ileocolonic anastomosis in the deep pelvis.    3.  Postoperative changes of a lower lumbar laminectomy with minimal edema in the soft tissues. No fluid collections.    4.  Distal pancreatic resection and splenectomy.     5.  Mild diffuse fatty infiltration of the liver.       I personally reviewed the EKG tracing showing SR

## 2020-10-04 NOTE — PROGRESS NOTES
Pt A&O, VSS. Patient received prn dilaudid x1 for abdominal pain rated 5/10, pain decreased and patient was able to sleep. CIWA's 0.

## 2020-10-04 NOTE — UTILIZATION REVIEW
"  Admission Status; Secondary Review Determination         Under the authority of the Utilization Management Committee, the utilization review process indicated a secondary review on the above patient.  The review outcome is based on review of the medical records, discussions with staff, and applying clinical experience noted on the date of the review.          (x) Observation Status Appropriate - This patient does not meet hospital inpatient criteria and is placed in observation status. If this patient's primary payer is Medicare and was admitted as an inpatient, Condition Code 44 should be used and patient status changed to \"observation\".     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  He had pancreatic biliary sphincterotomy and aspiration of the pseudocyst in 03/20/2019. Patient has a history of recurrent pancreatitis.  Last episode was 08/04/2020  Patient presented for evaluation of worsening epigastric pain for 2 days.  Lipase elevated at 1072.  Leukocytosis at 14.5, CT scan showed: marked wall thickening involving the stomach diffusely. Wall thickening near the gastric cardia and fundus has increased since the most recent available study along with wall thickening in the mid gastric body. Distal gastric wall thickening at the level of the antrum and into the first portion of the duodenum, however, is decreased. Overall inflammatory change in the perigastric fat planes. Findings may represent persistent gastritis. No other areas of small bowel dilatation or obstruction. Postoperative changes of near-total colectomy with ileocolonic anastomosis in the deep pelvis.    No convincing evidence on imaging of severe recurrent pancreatitis or pancreatic inflammation, symptoms might be related " to gastritis based on finding, discussed with attending physician.  Patient had 1 dose of IV Dilaudid this morning however there is no oral option for pain medication. The severity of illness, intensity of service provided, expected LOS and risk for adverse outcome make the care appropriate for further observation; however, doesn't meet criteria for hospital inpatient admission. Dr. Mejias  notified of this determination.    This document was produced using voice recognition software.      The information on this document is developed by the utilization review team in order for the business office to ensure compliance.  This only denotes the appropriateness of proper admission status and does not reflect the quality of care rendered.         The definitions of Inpatient Status and Observation Status used in making the determination above are those provided in the CMS Coverage Manual, Chapter 1 and Chapter 6, section 70.4.      Sincerely,     WESLEY GODOY MD    System Medical Director  Utilization Management  [facility].

## 2020-10-04 NOTE — PROGRESS NOTES
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  He had pancreatic biliary sphincterotomy and aspiration of the pseudocyst in 03/20/2019  Patient has a history of recurrent pancreatitis.  Last episode was 08/04/2020.  Presents with 2 days of worsening epigastric, left upper quadrant pain along with N/V.  CT shows thickening and inflammation of the stomach, but no significant inflammation in the pancreas.  This appears to be most consistent with gastritis  --this AM, still with ongoing pain, N/V.  Advance diet to clears if tolerated  --Continue pantoprazole 40 mg daily  --We will need to discharge with a PPI.  --Start Carafate prior to meals, when he starts taking adequate p.o. (not yet ordered)  --Antiemetics with Zofran and Compazine  --Pain control with acetaminophen, Dilaudid IV, oxycodone oral  --Continue IV fluids at 125 an hour.  If he is able to tolerate oral liquids, can DC the IV fluids  --Given his complex pancreas history, if he were to decompensate, he would need transfer to [facility]  --Consider outpatient EGD    Hypomag: on protocol      Hypertension, benign essential, goal below 140/90  Continue home medication lisinopril and Toprol-XL      CAD (coronary artery disease)  Cath showed moderate coronary disease in 2009, no stents placed  Continue home Crestor 10  mg daily      Type 2 diabetes mellitus with diabetic polyneuropathy, without long-term current use of insulin (H)  Home medication Novolin 34 units in the morning and 32 units at night  Patient is n.p.o.  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      Chronic atrial fibrillation (H) - continue warfarin, pharm D to dose        Alcohol dependence, uncomplicated (H)  CIWA protocol.  If no signs of withdrawal by 10/5, CIWA can be stopped.  He reports he drinks 2 days a week, 2-4 beers at a time.  He reports he has never had withdrawal.      Hypertrophy of prostate without urinary obstruction  Continue Flomax      Hyperlipidemia LDL goal <100  Resume home Crestor 10 mg daily         Diet: Advance Diet as Tolerated: Clear Liquid Diet    DVT Prophylaxis: Low Risk/Ambulatory with no VTE prophylaxis indicated  Chavez Catheter: not present  Code Status: Full Code           Disposition Plan   Expected discharge: 1-2 days, recommended to prior living arrangement once adequate pain management/ tolerating PO medications.  Entered: Citlalli Mejias DO 10/04/2020, 1:47 PM       The patient's care was discussed with the Patient.    Citlalli Mejias DO  Hospitalist Service  [facility]   Contact information available via [facility] Paging/Directory    ______________________________________________________________________    Interval History   --He reports ongoing abdominal pain.  He reports that it is in a slightly different location than his normal pancreatitis pain.  Typically his pancreatitis pain is epigastric, and this is more left upper quadrant.  He is having ongoing nausea with occasional episodes of dry heaving today.  He denies any heartburn symptoms.    Data reviewed today: I reviewed all medications, new labs and imaging results over the last 24  hours. I personally reviewed no images or EKG's today.    Physical Exam   Vital Signs: Temp: 97.8  F (36.6  C) Temp src: Oral BP: 136/71 Pulse: 71   Resp: 16 SpO2: 92 % O2 Device: None (Room air)    Weight: 201 lbs 11.53 oz    Gen: alert, in no distress  CV: RRR, S1 and S2 normal, no murmurs. Radial and pedal pulses symmetric and normal  Respiratory: Lungs clear bilaterally  Abd: Soft, moderate RUQ pain, no epigastric pain.  Normal bowel sounds.  Large abdominal scars  Lymph: No peripheral edema  MSK: moves all extremities equally.  Skin: no significant rashes.        Data   Recent Labs   Lab 10/04/20  0540 10/03/20  1647   WBC 12.4* 14.5*   HGB 13.4 15.0   MCV 95 98    214   INR 1.84* 1.76*    136   POTASSIUM 4.2 4.3   CHLORIDE 103 103   CO2 29 28   BUN 11 14   CR 0.91 0.99   ANIONGAP 4 5   NILSON 8.5 10.0   * 152*   ALBUMIN 2.7* 3.5   PROTTOTAL 6.5* 7.8   BILITOTAL 0.8 0.7   ALKPHOS 49 58   ALT 16 20   AST 13 15   LIPASE  --  1,072*   TROPI  --  <0.015     Recent Results (from the past 24 hour(s))   CT Abdomen Pelvis w Contrast    Narrative    EXAM: CT ABDOMEN PELVIS W CONTRAST  LOCATION: [facility]  DATE/TIME: 10/3/2020 6:50 PM    INDICATION: Epigastric and left upper quadrant abdominal pain.  COMPARISON: Multiple prior studies with most recent from 05/22/2019.  TECHNIQUE: CT scan of the abdomen and pelvis was performed following injection of IV contrast. Multiplanar reformats were obtained. Dose reduction techniques were used.  CONTRAST: 97 ml Isovue-370.    FINDINGS:   LOWER CHEST: Minimal linear scarring involving both lung bases. No pleural fluid.    HEPATOBILIARY: Mild diffuse fatty infiltration of the liver. No calcified gallstones or biliary dilatation.    PANCREAS: Resection of the distal aspect of the pancreas. More proximal aspect of the pancreas demonstrates no evidence for ductal dilatation or overt abnormality.    SPLEEN: Splenectomy.    ADRENAL GLANDS: No significant  nodules.    KIDNEYS/BLADDER: Symmetric contrast enhancement to both kidneys. Bilateral renal cysts. No further follow-up necessary. Mild cortical scarring and thinning. No urinary collecting system dilatation or calculi. Bladder unremarkable. Minimal prostate   enlargement.    BOWEL: There remains marked wall thickening involving the stomach diffusely. The wall thickening near the gastric cardia and fundus has increased since the most recent available study along with wall thickening in the mid gastric body. Distal gastric   wall thickening near the antrum and extending into the first portion of the duodenum has decreased. Overall inflammatory change surrounding the stomach is slightly decreased but there still remains mild underlying inflammatory edema. Duodenum   unremarkable. No small bowel dilatation or wall thickening. Near-complete resection of the colon. Postoperative changes of ileocolonic anastomosis. Distal colon and rectum unremarkable.    LYMPH NODES: No lymphadenopathy.    VASCULATURE: Normal caliber abdominal aorta. Marked atherosclerotic vascular calcification.    MUSCULOSKELETAL: Postoperative changes of lower lumbar laminectomy. Minimal degenerative hypertrophic changes in the spine. Mild soft tissue edema over the laminectomy site.      Impression    IMPRESSION:   1.  There remains marked wall thickening involving the stomach diffusely. Wall thickening near the gastric cardia and fundus has increased since the most recent available study along with wall thickening in the mid gastric body. Distal gastric wall   thickening at the level of the antrum and into the first portion of the duodenum, however, is decreased. Overall inflammatory change in the perigastric fat planes. Findings may represent persistent gastritis. Consider follow-up EGD if clinically   necessary.    2.  No other areas of small bowel dilatation or obstruction. Postoperative changes of near-total colectomy with ileocolonic  anastomosis in the deep pelvis.    3.  Postoperative changes of a lower lumbar laminectomy with minimal edema in the soft tissues. No fluid collections.    4.  Distal pancreatic resection and splenectomy.     5.  Mild diffuse fatty infiltration of the liver.     Medications     lactated ringers       lactated ringers 125 mL/hr at 10/04/20 1125     - MEDICATION INSTRUCTIONS -       Warfarin Therapy Reminder         folic acid  1 mg Oral Daily     insulin aspart  1-4 Units Subcutaneous Q4H     lisinopril  5 mg Oral Daily     metoprolol succinate ER  75 mg Oral BID     multivitamin w/minerals  1 tablet Oral Daily     pantoprazole  40 mg Intravenous Q24H     rosuvastatin  10 mg Oral Daily     sodium chloride (PF)  3 mL Intracatheter Q8H     tamsulosin  0.4 mg Oral Daily     thiamine  100 mg Oral Daily     warfarin ANTICOAGULANT  1.25 mg Oral ONCE at 18:00

## 2020-10-04 NOTE — PROGRESS NOTES
Patient has  [place] to Observation  order. Patient has been given Patient Bill of Rights, Observation brochure and  What does Observation mean to me  forms.  Patient has been given the opportunity to ask questions about observation status and their plan of care.     JAMMIE Rojas on 10/4/2020 at 2:39 PM

## 2020-10-04 NOTE — CONSULTS
"Care Management Initial Consult    General Information:  Patient's communication style: spoken language (English or Bilingual)  Hearing Difficulty or Deaf: no Wear Glasses or Blind: yes  Cognitive/Neuro/Behavioral: WDL                 Advance Care Planning:         Living Environment:   People in home: spouse  Current living Arrangements: house       (Return home with spouse)    Family/Social Support:  Care provided by:    Provides care for:    Description of Support System:       Support Assessment: Adequate family and caregiver support    Lifestyle & Psychosocial Needs:        Socioeconomic History     Marital status:      Spouse name: Not on file     Number of children: Not on file     Years of education: Not on file     Highest education level: Not on file   Occupational History     Occupation: [employer]     Comment: 20 hr/week monday-Thur 7-noon     Occupation: T[employer]      Employer: [employer]     Occupation: RETIRED     Tobacco Use     Smoking status: Former Smoker     Packs/day: 1.00     Years: 40.00     Pack years: 40.00     Types: Cigarettes     Quit date: 2006     Years since quittin.8     Smokeless tobacco: Never Used   Substance and Sexual Activity     Alcohol use: Not Currently     Comment: \"1 beer on Thanksgiving day\"     Drug use: Yes     Comment: 3 beers daily     Sexual activity: Yes     Partners: Female          Employment:  Employment Status: retired                         Additional Information:  SW consulted due to High Risk of Unplanned Readmission Risk.     SW met with the pt to discuss discharge planning and offer community resources and support. Pt reported that he is independent at home and feels that he is able to return home without concern. Pt reports that his spouse Amina is supportive and can assist with care needs.    Pt verbalized a strong connection to his dogs. Stated that his 8 lb. jen dog -Alexandra is the \"love of " "his life and runs the house.\" Pt states he is anxious to get home to see her.     Pt states that he has his car parked in the hospital parking lot and plans to drive himself home upon discharge if feeling well enough. If he is not up to driving then his spouse Amina can pick him up.     Pt signed OBS notice and verbalized no further questions/concern.     Plan:  No discharge needs identified at this time. SW to remain available should needs arise prior to pt's discharge to home with spouse.     Internal Clinic Care Coordination Referral to be completed upon discharge for follow-up due to High Unplanned Readmission Risk    JAMMIE Rojas      "

## 2020-10-04 NOTE — PHARMACY-ANTICOAGULATION SERVICE
Clinical Pharmacy - Warfarin Dosing Consult     Pharmacy has been consulted to manage this patient s warfarin therapy.  Indication: Atrial Fibrillation  Therapy Goal: INR 2-3  Provider/Team: Ro GUZMAN  Warfarin Prior to Admission: Yes  Warfarin PTA Regimen: 2.5mg Tues,Thurs,Saturday; 1.25mg rest of week  Significant drug interactions: No  Recent documented change in oral intake/nutrition: Unknown    INR   Date Value Ref Range Status   10/03/2020 1.76 (H) 0.86 - 1.14 Final   09/25/2020 2.40 (H) 0.86 - 1.14 Final     Comment:     This test is intended for monitoring Coumadin therapy.  Results are not   accurate in patients with prolonged INR due to factor deficiency.         Recommend warfarin 2.5 mg today.  Pharmacy will monitor Antoine Russo daily and order warfarin doses to achieve specified goal.      Please contact pharmacy as soon as possible if the warfarin needs to be held for a procedure or if the warfarin goals change.      Kayy Ramirez, PharmD

## 2020-10-05 VITALS
OXYGEN SATURATION: 92 % | WEIGHT: 201.72 LBS | SYSTOLIC BLOOD PRESSURE: 139 MMHG | BODY MASS INDEX: 28.88 KG/M2 | RESPIRATION RATE: 16 BRPM | HEART RATE: 78 BPM | TEMPERATURE: 98.3 F | HEIGHT: 70 IN | DIASTOLIC BLOOD PRESSURE: 63 MMHG

## 2020-10-05 LAB
ALBUMIN SERPL-MCNC: 2.6 G/DL (ref 3.4–5)
ALP SERPL-CCNC: 49 U/L (ref 40–150)
ALT SERPL W P-5'-P-CCNC: 14 U/L (ref 0–70)
ANION GAP SERPL CALCULATED.3IONS-SCNC: 4 MMOL/L (ref 3–14)
AST SERPL W P-5'-P-CCNC: 12 U/L (ref 0–45)
BILIRUB SERPL-MCNC: 0.7 MG/DL (ref 0.2–1.3)
BUN SERPL-MCNC: 10 MG/DL (ref 7–30)
CALCIUM SERPL-MCNC: 8.5 MG/DL (ref 8.5–10.1)
CHLORIDE SERPL-SCNC: 106 MMOL/L (ref 94–109)
CO2 SERPL-SCNC: 27 MMOL/L (ref 20–32)
CREAT SERPL-MCNC: 0.82 MG/DL (ref 0.66–1.25)
ERYTHROCYTE [DISTWIDTH] IN BLOOD BY AUTOMATED COUNT: 16.9 % (ref 10–15)
GFR SERPL CREATININE-BSD FRML MDRD: 88 ML/MIN/{1.73_M2}
GLUCOSE BLDC GLUCOMTR-MCNC: 123 MG/DL (ref 70–99)
GLUCOSE BLDC GLUCOMTR-MCNC: 150 MG/DL (ref 70–99)
GLUCOSE BLDC GLUCOMTR-MCNC: 182 MG/DL (ref 70–99)
GLUCOSE SERPL-MCNC: 143 MG/DL (ref 70–99)
HCT VFR BLD AUTO: 40 % (ref 40–53)
HGB BLD-MCNC: 12.8 G/DL (ref 13.3–17.7)
INR PPP: 1.96 (ref 0.86–1.14)
MAGNESIUM SERPL-MCNC: 2 MG/DL (ref 1.6–2.3)
MCH RBC QN AUTO: 31.1 PG (ref 26.5–33)
MCHC RBC AUTO-ENTMCNC: 32 G/DL (ref 31.5–36.5)
MCV RBC AUTO: 97 FL (ref 78–100)
PLATELET # BLD AUTO: 187 10E9/L (ref 150–450)
POTASSIUM SERPL-SCNC: 4.4 MMOL/L (ref 3.4–5.3)
PROT SERPL-MCNC: 6.3 G/DL (ref 6.8–8.8)
RBC # BLD AUTO: 4.11 10E12/L (ref 4.4–5.9)
SODIUM SERPL-SCNC: 137 MMOL/L (ref 133–144)
WBC # BLD AUTO: 11.2 10E9/L (ref 4–11)

## 2020-10-05 PROCEDURE — 250N000013 HC RX MED GY IP 250 OP 250 PS 637: Performed by: FAMILY MEDICINE

## 2020-10-05 PROCEDURE — 250N000011 HC RX IP 250 OP 636: Performed by: PHYSICIAN ASSISTANT

## 2020-10-05 PROCEDURE — 258N000003 HC RX IP 258 OP 636: Performed by: PHYSICIAN ASSISTANT

## 2020-10-05 PROCEDURE — 250N000013 HC RX MED GY IP 250 OP 250 PS 637: Performed by: INTERNAL MEDICINE

## 2020-10-05 PROCEDURE — 250N000011 HC RX IP 250 OP 636: Performed by: FAMILY MEDICINE

## 2020-10-05 PROCEDURE — 85027 COMPLETE CBC AUTOMATED: CPT | Performed by: FAMILY MEDICINE

## 2020-10-05 PROCEDURE — 36415 COLL VENOUS BLD VENIPUNCTURE: CPT | Performed by: FAMILY MEDICINE

## 2020-10-05 PROCEDURE — C9113 INJ PANTOPRAZOLE SODIUM, VIA: HCPCS | Performed by: FAMILY MEDICINE

## 2020-10-05 PROCEDURE — 999N001017 HC STATISTIC GLUCOSE BY METER IP

## 2020-10-05 PROCEDURE — 80053 COMPREHEN METABOLIC PANEL: CPT | Performed by: FAMILY MEDICINE

## 2020-10-05 PROCEDURE — 99217 PR OBSERVATION CARE DISCHARGE: CPT | Performed by: INTERNAL MEDICINE

## 2020-10-05 PROCEDURE — 96376 TX/PRO/DX INJ SAME DRUG ADON: CPT

## 2020-10-05 PROCEDURE — 85610 PROTHROMBIN TIME: CPT | Performed by: FAMILY MEDICINE

## 2020-10-05 PROCEDURE — 83735 ASSAY OF MAGNESIUM: CPT | Performed by: FAMILY MEDICINE

## 2020-10-05 PROCEDURE — G0378 HOSPITAL OBSERVATION PER HR: HCPCS

## 2020-10-05 RX ORDER — PANTOPRAZOLE SODIUM 40 MG/1
40 TABLET, DELAYED RELEASE ORAL DAILY
Qty: 30 TABLET | Refills: 1 | Status: SHIPPED | OUTPATIENT
Start: 2020-10-05 | End: 2021-02-16

## 2020-10-05 RX ADMIN — SODIUM CHLORIDE, POTASSIUM CHLORIDE, SODIUM LACTATE AND CALCIUM CHLORIDE: 600; 310; 30; 20 INJECTION, SOLUTION INTRAVENOUS at 10:55

## 2020-10-05 RX ADMIN — WARFARIN SODIUM 1.25 MG: 2.5 TABLET ORAL at 18:01

## 2020-10-05 RX ADMIN — METOPROLOL SUCCINATE 75 MG: 25 TABLET, EXTENDED RELEASE ORAL at 09:07

## 2020-10-05 RX ADMIN — TAMSULOSIN HYDROCHLORIDE 0.4 MG: 0.4 CAPSULE ORAL at 09:07

## 2020-10-05 RX ADMIN — HYDROMORPHONE HYDROCHLORIDE 0.5 MG: 1 INJECTION, SOLUTION INTRAMUSCULAR; INTRAVENOUS; SUBCUTANEOUS at 09:07

## 2020-10-05 RX ADMIN — ROSUVASTATIN CALCIUM 10 MG: 5 TABLET, FILM COATED ORAL at 09:07

## 2020-10-05 RX ADMIN — SODIUM CHLORIDE, POTASSIUM CHLORIDE, SODIUM LACTATE AND CALCIUM CHLORIDE: 600; 310; 30; 20 INJECTION, SOLUTION INTRAVENOUS at 01:47

## 2020-10-05 RX ADMIN — HYDROMORPHONE HYDROCHLORIDE 0.5 MG: 1 INJECTION, SOLUTION INTRAMUSCULAR; INTRAVENOUS; SUBCUTANEOUS at 03:07

## 2020-10-05 RX ADMIN — PANTOPRAZOLE SODIUM 40 MG: 40 INJECTION, POWDER, LYOPHILIZED, FOR SOLUTION INTRAVENOUS at 09:07

## 2020-10-05 RX ADMIN — THIAMINE HCL TAB 100 MG 100 MG: 100 TAB at 09:07

## 2020-10-05 RX ADMIN — LISINOPRIL 5 MG: 5 TABLET ORAL at 09:07

## 2020-10-05 RX ADMIN — MULTIPLE VITAMINS W/ MINERALS TAB 1 TABLET: TAB at 09:07

## 2020-10-05 RX ADMIN — FOLIC ACID 1 MG: 1 TABLET ORAL at 09:07

## 2020-10-05 NOTE — TELEPHONE ENCOUNTER
Confirmed with pharmacy they received the Warfarin refill.    Brianna Robles, PharmD BCACP  Anticoagulation Clinical Pharmacist

## 2020-10-05 NOTE — DISCHARGE INSTRUCTIONS
Coumadin Discharge Instructions:  1. Continue home dose of Coumadin 2.5 mg TuesThursSat, 1.25 mg rest of week.  2. Recheck INR on 10/16 @ 8:45 am at [facility]. Further dosing instructions per Anticoagulation Clinic.

## 2020-10-05 NOTE — DISCHARGE SUMMARY
[facility]   Discharge Summary  Hospital Medicine       Date of Admission:  10/3/2020  Date of Discharge:  10/5/2020  6:57 PM  Discharging Provider: Neo Shetty MD      Identification and Chief Compaint: Antoine Russo is a 74 year old male who presented on 10/3/2020 with complaint of 2 days of epigastric pain.    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  No follow up labs or test are needed.             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  He had pancreatic biliary sphincterotomy and aspiration of the pseudocyst in 03/20/2019  Patient has a history of recurrent pancreatitis.  Last episode was 08/04/2020.  Presents with 2 days of worsening epigastric, left upper quadrant pain along with N/V.  CT shows thickening and inflammation of the stomach, but no significant inflammation in the pancreas.  This appears to  be most consistent with gastritis    Treated with IV pantoprazole 40 mg daily and patient did improve. Tolerated clear liquid diet and advanced to diabetic diet. Patient feels residual abdominal discomfort resolved when starting regular food. Will treat with oral PPI two months. If symptoms return, consider carafate and outpatient EGD. Suggested to reduce alcohol intake as this is risk factor for gastritis.     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      Chronic atrial fibrillation (H) - continue warfarin, pharm D to dose      Alcohol dependence, uncomplicated (H)  CIWA protocol.  If no signs of withdrawal by 10/5, CIWA can be stopped.  He reports he drinks 2 days a week, 2-4 beers at a time.  He reports he has never had withdrawal.    No evidence of withdrawal this admission.       Hypertrophy of prostate without urinary obstruction  Continue Flomax      Hyperlipidemia LDL goal <100  Resume home Crestor 10 mg daily      Consultations This Hospital Stay   None    Code Status   Full Code    The discharge plan was discussed with the patient, and he expressed understanding.     Time Spent on this Encounter   Total time on this discharge was 40 minutes over two visits.       Neo Shetty MD  [facility]   ______________________________________________________________________    Physical Exam   Vital Signs: Temp: 98.3  F (36.8  C) Temp src: Oral BP: 139/63 Pulse: 78   Resp: 16 SpO2: 92 % O2 Device: None (Room air)     Weight: 201 lbs 11.53 oz  Constitutional: alert and oriented, NAD, nontoxic  CV: Regular  Respiratory: CTA bilaterally  GI: Soft, non-tender   Skin: Warm and dry       Primary Care Physician   Kaite Gross  [address] [facility] [id number]     Discharge Disposition   Discharged to home  Condition at discharge: Stable    Significant Results and Procedures   Results for orders placed or performed during the hospital encounter of 10/03/20   CT Abdomen Pelvis w Contrast    Narrative    EXAM: CT ABDOMEN PELVIS W CONTRAST  LOCATION: [facility]  DATE/TIME: 10/3/2020 6:50 PM    INDICATION: Epigastric and left upper quadrant abdominal pain.  COMPARISON: Multiple prior studies with most recent from 05/22/2019.  TECHNIQUE: CT scan of the abdomen and pelvis was performed following injection of IV contrast. Multiplanar reformats were obtained. Dose reduction techniques were used.  CONTRAST: 97 ml Isovue-370.    FINDINGS:   LOWER CHEST: Minimal linear scarring involving both lung bases. No pleural fluid.    HEPATOBILIARY: Mild diffuse fatty infiltration of the liver. No calcified gallstones or biliary dilatation.    PANCREAS: Resection of the distal aspect of the pancreas. More proximal aspect of the pancreas demonstrates no evidence for ductal dilatation or overt abnormality.    SPLEEN: Splenectomy.    ADRENAL GLANDS: No significant nodules.    KIDNEYS/BLADDER: Symmetric contrast enhancement to both kidneys. Bilateral renal cysts. No further follow-up necessary. Mild cortical scarring and thinning. No urinary collecting system dilatation or calculi. Bladder unremarkable. Minimal prostate   enlargement.    BOWEL: There remains marked wall thickening involving the stomach diffusely. The wall thickening near the gastric cardia and fundus has increased since the most recent available study along with wall thickening in the mid gastric body. Distal gastric   wall thickening near the antrum and extending into the  first portion of the duodenum has decreased. Overall inflammatory change surrounding the stomach is slightly decreased but there still remains mild underlying inflammatory edema. Duodenum   unremarkable. No small bowel dilatation or wall thickening. Near-complete resection of the colon. Postoperative changes of ileocolonic anastomosis. Distal colon and rectum unremarkable.    LYMPH NODES: No lymphadenopathy.    VASCULATURE: Normal caliber abdominal aorta. Marked atherosclerotic vascular calcification.    MUSCULOSKELETAL: Postoperative changes of lower lumbar laminectomy. Minimal degenerative hypertrophic changes in the spine. Mild soft tissue edema over the laminectomy site.      Impression    IMPRESSION:   1.  There remains marked wall thickening involving the stomach diffusely. Wall thickening near the gastric cardia and fundus has increased since the most recent available study along with wall thickening in the mid gastric body. Distal gastric wall   thickening at the level of the antrum and into the first portion of the duodenum, however, is decreased. Overall inflammatory change in the perigastric fat planes. Findings may represent persistent gastritis. Consider follow-up EGD if clinically   necessary.    2.  No other areas of small bowel dilatation or obstruction. Postoperative changes of near-total colectomy with ileocolonic anastomosis in the deep pelvis.    3.  Postoperative changes of a lower lumbar laminectomy with minimal edema in the soft tissues. No fluid collections.    4.  Distal pancreatic resection and splenectomy.     5.  Mild diffuse fatty infiltration of the liver.     Procedures    None    Discharge Orders      Reason for your hospital stay    Gastritis (inflammation of the stomach)     Follow-up and recommended labs and tests     Follow up with primary care provider, Katie Gross, within 7 days for hospital follow- up.  No follow up labs or test are needed.     Activity    Your activity  upon discharge: activity as tolerated     Diet    Follow this diet upon discharge: Diabetic diet     Discharge Medications   Current Discharge Medication List      START taking these medications    Details   pantoprazole (PROTONIX) 40 MG EC tablet Take 1 tablet (40 mg) by mouth daily  Qty: 30 tablet, Refills: 1    Comments: May use formulary substitute  Associated Diagnoses: Acute gastritis without hemorrhage, unspecified gastritis type         CONTINUE these medications which have NOT CHANGED    Details   Continuous Blood Gluc  (FREESTYLE LISA 14 DAY READER) JOSE ALBERTO 1 each as needed (use to scan blood sugars from sensor)  Qty: 1 Device, Refills: 0    Associated Diagnoses: Type 2 diabetes mellitus with diabetic polyneuropathy, without long-term current use of insulin (H)      Continuous Blood Gluc Sensor (FREESTYLE LISA 14 DAY SENSOR) MISC 1 each every 14 days  Qty: 2 each, Refills: 11    Associated Diagnoses: Type 2 diabetes mellitus with diabetic polyneuropathy, without long-term current use of insulin (H)      insulin isophane human (NOVOLIN N FLEXPEN RELION) 100 UNIT/ML injection Take 34 units of N in the am and 32 units in the pm.  Qty: 30 mL, Refills: 5    Associated Diagnoses: Type 2 diabetes mellitus with diabetic polyneuropathy, without long-term current use of insulin (H)      insulin pen needle (BD LUIS U/F) 32G X 4 MM miscellaneous USE PEN NEEDLE ONCE DAILY AS DIRECTED  Qty: 60 each, Refills: 0    Associated Diagnoses: Type 2 diabetes mellitus with diabetic polyneuropathy, without long-term current use of insulin (H)      lisinopril (PRINIVIL/ZESTRIL) 5 MG tablet Take 1 tablet (5 mg) by mouth daily  Qty: 90 tablet, Refills: 3    Comments: Fill when needed  Associated Diagnoses: Hypertension, benign essential, goal below 140/90      metoprolol succinate ER (TOPROL-XL) 50 MG 24 hr tablet TAKE 1 & 1/2 (ONE & ONE-HALF) TABLETS BY MOUTH TWICE DAILY  Qty: 270 tablet, Refills: 0    Associated Diagnoses:  Hypertension, benign essential, goal below 140/90      multivitamin, therapeutic with minerals (THERA-VIT-M) TABS Take 1 tablet by mouth daily.  Qty: 30 each, Refills: 1    Associated Diagnoses: Surgery aftercare      rosuvastatin (CRESTOR) 10 MG tablet Take 1 tablet (10 mg) by mouth daily  Qty: 90 tablet, Refills: 3    Associated Diagnoses: Hyperlipidemia LDL goal <100      tamsulosin (FLOMAX) 0.4 MG capsule Take 1 capsule (0.4 mg) by mouth daily  Qty: 90 capsule, Refills: 3    Associated Diagnoses: Urgency of urination; Hypertrophy of prostate without urinary obstruction      vitamin B-12 (CYANOCOBALAMIN) 100 MCG tablet Take 100 mcg by mouth daily      warfarin ANTICOAGULANT (COUMADIN) 2.5 MG tablet Take 2.5mg (1 tab) Tues/Thurs/Sat; 1.25mg (1/2 tab) all other days or as directed by the Anticoagulation Clinic  Qty: 70 tablet, Refills: 0    Associated Diagnoses: Chronic atrial fibrillation (H)           Allergies   Allergies   Allergen Reactions     Nkda [No Known Drug Allergies]

## 2020-10-05 NOTE — PHARMACY-ANTICOAGULATION SERVICE
Clinical Pharmacy- Warfarin Discharge Note  This patient is currently on warfarin for the treatment of Atrial fibrillation.  INR Goal= 2-3  Expected length of therapy lifetime.    Warfarin PTA Regimen: 2.5mg Tues,Thurs,Saturday; 1.25mg rest of week      Anticoagulation Dose History     Recent Dosing and Labs Latest Ref Rng & Units 8/28/2020 9/4/2020 9/11/2020 9/25/2020 10/3/2020 10/4/2020 10/5/2020    Warfarin 1.25 mg - - - - - - 1.25 mg 1.25 mg    Warfarin 2.5 mg - - - - - 2.5 mg - -    INR 0.86 - 1.14 4.20(H) 3.20(H) 2.50(H) 2.40(H) 1.76(H) 1.84(H) 1.96(H)    INR 0.86 - 1.14 - - - - - - -          Vitamin K doses administered during the last 7 days: none  FFP administered during the last 7 days: none  Recommend discharging the patient on PTA regimen. INR subtherapeutic on admit, likely due to missed doses from GI symptoms. Uptrending nicely on PTA regimen.    The patient should have an INR checked at previously scheduled appointment 10/16/20.

## 2020-10-05 NOTE — PLAN OF CARE
CIWA's 0. Pain is managed with 0.5 mg dilaudid given x 2 this shift. IV fluids infusing. No complaints of nausea. BG was 133 according to hospital glucose monitor and 129 according to patient's Berta monitor. No corrective insulin required.

## 2020-10-06 ENCOUNTER — DOCUMENTATION ONLY (OUTPATIENT)
Dept: FAMILY MEDICINE | Facility: CLINIC | Age: 75
End: 2020-10-06

## 2020-10-06 ENCOUNTER — TELEPHONE (OUTPATIENT)
Dept: FAMILY MEDICINE | Facility: CLINIC | Age: 75
End: 2020-10-06

## 2020-10-06 DIAGNOSIS — I48.20 CHRONIC ATRIAL FIBRILLATION (H): ICD-10-CM

## 2020-10-06 DIAGNOSIS — Z79.01 LONG TERM CURRENT USE OF ANTICOAGULANT THERAPY: ICD-10-CM

## 2020-10-06 NOTE — TELEPHONE ENCOUNTER
ED/UC/IP follow up phone call: Acute Pancreatitis, Acute Gastritis without Henorrhage - 10/05/20    RN please call to follow up.    Number of ED visits in past 12 months = 1

## 2020-10-06 NOTE — PROGRESS NOTES
ANTICOAGULATION  MANAGEMENT: Discharge Review    Antoine Russo chart reviewed for anticoagulation continuity of care    Hospital Admission on 10/3/20-10/5/20 for suspect acute gastritis, acute pancreatitis likely ruled out.     Discharge disposition: Home    Results:    Recent labs: (last 7 days)     10/03/20  1647 10/04/20  0540 10/05/20  0559   INR 1.76* 1.84* 1.96*     Anticoagulation inpatient management:     home regimen continued    Anticoagulation discharge instructions:     Warfarin dosing: home regimen continued   Bridging: No   INR goal change: No      Medication changes affecting anticoagulation: Yes: started Protonix. Lexicomp Uptodate: Summary Pantoprazole may enhance the anticoagulant effect of Vitamin K Antagonists. Severity Moderate Reliability Rating Fair   Patient Management No action required.    Additional factors affecting anticoagulation: Yes: abdominal pain, improved. Prior to admission patient had missed doses and INR was subtherapeutic, but was unsure which dates were missed.     Plan     No adjustment to anticoagulation plan needed  Agree with dosing adjustment on discharge  Agree with discharge plan for follow up on 10/16 as scheduled    Recommended follow up is scheduled  Patient not contacted    Anticoagulation Calendar updated    Maria Victoria Castaneda RN

## 2020-10-06 NOTE — PROGRESS NOTES
[facility] DISCHARGE NOTE    Patient discharged to home at 7:29 PM via wheel chair. Accompanied staff. Discharge instructions reviewed with patient, opportunity offered to ask questions. Prescriptions sent to patients preferred pharmacy. All belongings sent with patient.    Ana Meyer RN

## 2020-10-06 NOTE — TELEPHONE ENCOUNTER
Hospital/TCU/ED for chronic condition Discharge Protocol    Has follow-up appointment scheduled.    Yazmin Ivory RN

## 2020-10-07 ENCOUNTER — PATIENT OUTREACH (OUTPATIENT)
Dept: GASTROENTEROLOGY | Facility: CLINIC | Age: 75
End: 2020-10-07

## 2020-10-07 NOTE — TELEPHONE ENCOUNTER
Patient w/ recent admission for acute gastritis and acute pancreatitis.  Former [facility] patient.   Per [facility]'s last note:  However, if and when he has another episode, would have him go to [facility] for hospitalization, and then once resolved repeat secretin MRCP down here to assess for recurrent leak. Then consider more aggressive ERCP if suggestive. Pt understands. No reason to take Creon at present.     Called to discuss with patient. sMRCP ordered, pt will call to schedule. Video visit scheduled 10-28.  ML

## 2020-10-12 ENCOUNTER — VIRTUAL VISIT (OUTPATIENT)
Dept: FAMILY MEDICINE | Facility: CLINIC | Age: 75
End: 2020-10-12
Payer: COMMERCIAL

## 2020-10-12 DIAGNOSIS — K29.00 ACUTE SUPERFICIAL GASTRITIS WITHOUT HEMORRHAGE: ICD-10-CM

## 2020-10-12 DIAGNOSIS — Z79.4 TYPE 2 DIABETES MELLITUS WITH DIABETIC NEUROPATHY, WITH LONG-TERM CURRENT USE OF INSULIN (H): ICD-10-CM

## 2020-10-12 DIAGNOSIS — Z09 HOSPITAL DISCHARGE FOLLOW-UP: Primary | ICD-10-CM

## 2020-10-12 DIAGNOSIS — E11.40 TYPE 2 DIABETES MELLITUS WITH DIABETIC NEUROPATHY, WITH LONG-TERM CURRENT USE OF INSULIN (H): ICD-10-CM

## 2020-10-12 PROCEDURE — 99495 TRANSJ CARE MGMT MOD F2F 14D: CPT | Mod: 95 | Performed by: FAMILY MEDICINE

## 2020-10-12 RX ORDER — PREGABALIN 75 MG/1
75 CAPSULE ORAL 2 TIMES DAILY
Qty: 60 CAPSULE | Refills: 5 | Status: SHIPPED | OUTPATIENT
Start: 2020-10-12 | End: 2021-04-23

## 2020-10-12 NOTE — PROGRESS NOTES
"Antoine Russo is a 74 year old male who is being evaluated via a billable telephone visit.      The patient has been notified of following:     \"This telephone visit will be conducted via a call between you and your physician/provider. We have found that certain health care needs can be provided without the need for a physical exam.  This service lets us provide the care you need with a short phone conversation.  If a prescription is necessary we can send it directly to your pharmacy.  If lab work is needed we can place an order for that and you can then stop by our lab to have the test done at a later time.    Telephone visits are billed at different rates depending on your insurance coverage. During this emergency period, for some insurers they may be billed the same as an in-person visit.  Please reach out to your insurance provider with any questions.    If during the course of the call the physician/provider feels a telephone visit is not appropriate, you will not be charged for this service.\"    Patient has given verbal consent for Telephone visit?  Yes    What phone number would you like to be contacted at? [phone]    How would you like to obtain your AVS? Sarah Murray     Antoine Russo is a 74 year old male who presents via phone visit today for the following health issues:    [facility]       Hospital Follow-up Visit:    Hospital/Nursing Home/IP Rehab Facility: [facility]  Date of Admission: 10/03/20  Date of Discharge: 10/05/20  Reason(s) for Admission: acute Gastritis      Was your hospitalization related to COVID-19? No   Problems taking medications regularly:  None  Medication changes since discharge: None  Problems adhering to non-medication therapy:  None    Summary of hospitalization:  [facility] discharge summary reviewed  Diagnostic Tests/Treatments reviewed.  Follow up needed: none  Other Healthcare Providers Involved in Patient s Care:         None  Update " since discharge: improved.       Post Discharge Medication Reconciliation: discharge medications reconciled, continue medications without change.  Plan of care communicated with patient              He was in the hospital with abdominal pain.   He initially thought it was his recurrent pancreatitis, but CT scan showed it was gastritis. He is on PPI and is much better.   No black or bloody stools     Diabetes  blood sugars are good  Lab Results   Component Value Date    A1C 7.1 10/03/2020    A1C 6.3 05/12/2020    A1C 6.5 02/04/2020    A1C 9.0 10/17/2019    A1C 9.7 07/18/2019     Neuropathy  Feet are numb and hurt  Had seen neurology in past    Review of Systems   ROS: 5 point ROS negative except as noted above in HPI, including Gen., Resp., CV, GI &  system review.        Objective          Vitals:  No vitals were obtained today due to virtual visit.    healthy, alert and no distress  PSYCH: Alert and oriented times 3; coherent speech, normal   rate and volume, able to articulate logical thoughts, able   to abstract reason, no tangential thoughts, no hallucinations   or delusions  His affect is normal  RESP: No cough, no audible wheezing, able to talk in full sentences  Remainder of exam unable to be completed due to telephone visits          Assessment/Plan:    Assessment & Plan     Hospital discharge follow-up  Improved  Continue the pantoprazole    Acute superficial gastritis without hemorrhage  Improved, as above    Type 2 diabetes mellitus with diabetic neuropathy, with long-term current use of insulin (H)  Neuropathy worsening  - pregabalin (LYRICA) 75 MG capsule; Take 1 capsule (75 mg) by mouth 2 times daily      Gone over benefits and risks, as well as side effects of medication.   Recheck 6 weeks    No follow-ups on file.    Katie Gross MD  [facility]    Phone call duration:  17 minutes

## 2020-10-16 ENCOUNTER — ANTICOAGULATION THERAPY VISIT (OUTPATIENT)
Dept: ANTICOAGULATION | Facility: CLINIC | Age: 75
End: 2020-10-16

## 2020-10-16 DIAGNOSIS — I48.20 CHRONIC ATRIAL FIBRILLATION (H): ICD-10-CM

## 2020-10-16 DIAGNOSIS — Z79.01 LONG TERM CURRENT USE OF ANTICOAGULANT THERAPY: ICD-10-CM

## 2020-10-16 LAB
CAPILLARY BLOOD COLLECTION: NORMAL
INR PPP: 2.1 (ref 0.86–1.14)

## 2020-10-16 PROCEDURE — 36416 COLLJ CAPILLARY BLOOD SPEC: CPT | Performed by: FAMILY MEDICINE

## 2020-10-16 PROCEDURE — 85610 PROTHROMBIN TIME: CPT | Performed by: FAMILY MEDICINE

## 2020-10-16 NOTE — PROGRESS NOTES
ANTICOAGULATION MANAGEMENT     Patient Name:  Antoine Russo  Date:  10/16/2020    ASSESSMENT /SUBJECTIVE:    Today's INR result of 2.10 is therapeutic. Goal INR of 2.0-3.0      Warfarin dose taken: Warfarin taken as instructed    Diet: No new diet changes affecting INR    Medication changes/ interactions: Taking protonix since hospital discharge (11 days), started lyrica (no interaction anticipated)    Previous INR: Subtherapeutic 1.96    S/S of bleeding or thromboembolism: No    New injury or illness: No    Upcoming surgery, procedure or cardioversion: No    Additional findings: None      PLAN:    Telephone call with Antoine regarding INR result and instructed:     Warfarin Dosing Instructions: Continue your current warfarin dose 2.5mg Tues,Thurs,Sat; 1.25mg all other days       Instructed patient to follow up no later than: 2 weeks  Lab visit scheduled    Education provided: Contact the anticoagulation clinic with any changes, questions or concerns at #[phone]       Poli verbalizes understanding and agrees to warfarin dosing plan.    Instructed to call the Anticoagulation Clinic for any changes, questions or concerns. (#[phone])        Adeline Duke RN CACP       OBJECTIVE:  Recent labs: (last 7 days)     10/16/20  0833   INR 2.10*         INR assessment THER    Recheck INR In: 2 WEEKS    INR Location Clinic      Anticoagulation Summary  As of 10/16/2020    INR goal:  2.0-3.0   TTR:  47.5 % (12 mo)   INR used for dosin.10 (10/16/2020)   Warfarin maintenance plan:  2.5 mg (2.5 mg x 1) every Tue, Thu, Sat; 1.25 mg (2.5 mg x 0.5) all other days   Full warfarin instructions:  2.5 mg every Tue, Thu, Sat; 1.25 mg all other days   Weekly warfarin total:  12.5 mg   No change documented:  Adeline Duke RN   Plan last modified:  Adeline Duke RN (2020)   Next INR check:  10/30/2020   Priority:  Maintenance   Target end date:  Indefinite    Indications    Chronic atrial  fibrillation (H) [I48.20]  Long term current use of anticoagulant therapy [Z79.01]             Anticoagulation Episode Summary     INR check location:      Preferred lab:      Send INR reminders to:  [facility]    Comments:  *       Anticoagulation Care Providers     Provider Role Specialty Phone number    Katie Martino MD [facility] [phone]

## 2020-10-17 ENCOUNTER — APPOINTMENT (OUTPATIENT)
Dept: CT IMAGING | Facility: CLINIC | Age: 75
End: 2020-10-17
Attending: EMERGENCY MEDICINE
Payer: COMMERCIAL

## 2020-10-17 ENCOUNTER — HOSPITAL ENCOUNTER (INPATIENT)
Facility: CLINIC | Age: 75
LOS: 3 days | Discharge: HOME | End: 2020-10-20
Attending: EMERGENCY MEDICINE | Admitting: FAMILY MEDICINE
Payer: COMMERCIAL

## 2020-10-17 DIAGNOSIS — K29.00 ACUTE GASTRITIS WITHOUT HEMORRHAGE, UNSPECIFIED GASTRITIS TYPE: ICD-10-CM

## 2020-10-17 DIAGNOSIS — Z20.828 EXPOSURE TO SARS-ASSOCIATED CORONAVIRUS: ICD-10-CM

## 2020-10-17 DIAGNOSIS — F10.20 ACUTE ALCOHOLISM (H): ICD-10-CM

## 2020-10-17 DIAGNOSIS — K85.90 ACUTE RECURRENT PANCREATITIS: ICD-10-CM

## 2020-10-17 DIAGNOSIS — F10.20 ALCOHOL DEPENDENCE, UNCOMPLICATED (H): ICD-10-CM

## 2020-10-17 LAB
ALBUMIN SERPL-MCNC: 3.4 G/DL (ref 3.4–5)
ALP SERPL-CCNC: 77 U/L (ref 40–150)
ALT SERPL W P-5'-P-CCNC: 22 U/L (ref 0–70)
ANION GAP SERPL CALCULATED.3IONS-SCNC: 3 MMOL/L (ref 3–14)
ANISOCYTOSIS BLD QL SMEAR: SLIGHT
AST SERPL W P-5'-P-CCNC: 14 U/L (ref 0–45)
BASOPHILS # BLD AUTO: 0 10E9/L (ref 0–0.2)
BASOPHILS NFR BLD AUTO: 0 %
BILIRUB SERPL-MCNC: 0.6 MG/DL (ref 0.2–1.3)
BUN SERPL-MCNC: 14 MG/DL (ref 7–30)
CALCIUM SERPL-MCNC: 10 MG/DL (ref 8.5–10.1)
CHLORIDE SERPL-SCNC: 101 MMOL/L (ref 94–109)
CO2 SERPL-SCNC: 32 MMOL/L (ref 20–32)
CREAT SERPL-MCNC: 1.12 MG/DL (ref 0.66–1.25)
DIFFERENTIAL METHOD BLD: ABNORMAL
EOSINOPHIL # BLD AUTO: 0 10E9/L (ref 0–0.7)
EOSINOPHIL NFR BLD AUTO: 0 %
ERYTHROCYTE [DISTWIDTH] IN BLOOD BY AUTOMATED COUNT: 16.6 % (ref 10–15)
GFR SERPL CREATININE-BSD FRML MDRD: 65 ML/MIN/{1.73_M2}
GLUCOSE BLDC GLUCOMTR-MCNC: 129 MG/DL (ref 70–99)
GLUCOSE SERPL-MCNC: 185 MG/DL (ref 70–99)
HCT VFR BLD AUTO: 48.3 % (ref 40–53)
HGB BLD-MCNC: 15.6 G/DL (ref 13.3–17.7)
INR PPP: 2.2 (ref 0.86–1.14)
LIPASE SERPL-CCNC: 1011 U/L (ref 73–393)
LYMPHOCYTES # BLD AUTO: 2.2 10E9/L (ref 0.8–5.3)
LYMPHOCYTES NFR BLD AUTO: 16 %
MCH RBC QN AUTO: 31.1 PG (ref 26.5–33)
MCHC RBC AUTO-ENTMCNC: 32.3 G/DL (ref 31.5–36.5)
MCV RBC AUTO: 96 FL (ref 78–100)
MONOCYTES # BLD AUTO: 1.1 10E9/L (ref 0–1.3)
MONOCYTES NFR BLD AUTO: 8 %
NEUTROPHILS # BLD AUTO: 10.6 10E9/L (ref 1.6–8.3)
NEUTROPHILS NFR BLD AUTO: 76 %
PLATELET # BLD AUTO: 336 10E9/L (ref 150–450)
PLATELET # BLD EST: ABNORMAL 10*3/UL
POTASSIUM SERPL-SCNC: 4.4 MMOL/L (ref 3.4–5.3)
PROT SERPL-MCNC: 8.1 G/DL (ref 6.8–8.8)
RBC # BLD AUTO: 5.02 10E12/L (ref 4.4–5.9)
SODIUM SERPL-SCNC: 136 MMOL/L (ref 133–144)
TROPONIN I SERPL-MCNC: <0.015 UG/L (ref 0–0.04)
WBC # BLD AUTO: 14 10E9/L (ref 4–11)

## 2020-10-17 PROCEDURE — 83690 ASSAY OF LIPASE: CPT | Performed by: EMERGENCY MEDICINE

## 2020-10-17 PROCEDURE — 258N000003 HC RX IP 258 OP 636: Performed by: EMERGENCY MEDICINE

## 2020-10-17 PROCEDURE — 120N000001 HC R&B MED SURG/OB

## 2020-10-17 PROCEDURE — 96376 TX/PRO/DX INJ SAME DRUG ADON: CPT | Performed by: EMERGENCY MEDICINE

## 2020-10-17 PROCEDURE — 85025 COMPLETE CBC W/AUTO DIFF WBC: CPT | Performed by: EMERGENCY MEDICINE

## 2020-10-17 PROCEDURE — 250N000011 HC RX IP 250 OP 636: Performed by: EMERGENCY MEDICINE

## 2020-10-17 PROCEDURE — U0003 INFECTIOUS AGENT DETECTION BY NUCLEIC ACID (DNA OR RNA); SEVERE ACUTE RESPIRATORY SYNDROME CORONAVIRUS 2 (SARS-COV-2) (CORONAVIRUS DISEASE [COVID-19]), AMPLIFIED PROBE TECHNIQUE, MAKING USE OF HIGH THROUGHPUT TECHNOLOGIES AS DESCRIBED BY CMS-2020-01-R: HCPCS | Performed by: EMERGENCY MEDICINE

## 2020-10-17 PROCEDURE — 999N001017 HC STATISTIC GLUCOSE BY METER IP

## 2020-10-17 PROCEDURE — 84484 ASSAY OF TROPONIN QUANT: CPT | Performed by: EMERGENCY MEDICINE

## 2020-10-17 PROCEDURE — C9113 INJ PANTOPRAZOLE SODIUM, VIA: HCPCS | Performed by: EMERGENCY MEDICINE

## 2020-10-17 PROCEDURE — 96375 TX/PRO/DX INJ NEW DRUG ADDON: CPT | Performed by: EMERGENCY MEDICINE

## 2020-10-17 PROCEDURE — 96365 THER/PROPH/DIAG IV INF INIT: CPT | Performed by: EMERGENCY MEDICINE

## 2020-10-17 PROCEDURE — C9803 HOPD COVID-19 SPEC COLLECT: HCPCS

## 2020-10-17 PROCEDURE — 96366 THER/PROPH/DIAG IV INF ADDON: CPT | Performed by: EMERGENCY MEDICINE

## 2020-10-17 PROCEDURE — 99285 EMERGENCY DEPT VISIT HI MDM: CPT | Mod: 25 | Performed by: EMERGENCY MEDICINE

## 2020-10-17 PROCEDURE — 250N000013 HC RX MED GY IP 250 OP 250 PS 637: Performed by: EMERGENCY MEDICINE

## 2020-10-17 PROCEDURE — 250N000009 HC RX 250: Performed by: EMERGENCY MEDICINE

## 2020-10-17 PROCEDURE — 74177 CT ABD & PELVIS W/CONTRAST: CPT

## 2020-10-17 PROCEDURE — 96361 HYDRATE IV INFUSION ADD-ON: CPT | Performed by: EMERGENCY MEDICINE

## 2020-10-17 PROCEDURE — 85610 PROTHROMBIN TIME: CPT | Performed by: EMERGENCY MEDICINE

## 2020-10-17 PROCEDURE — 80053 COMPREHEN METABOLIC PANEL: CPT | Performed by: EMERGENCY MEDICINE

## 2020-10-17 RX ORDER — ONDANSETRON 2 MG/ML
4 INJECTION INTRAMUSCULAR; INTRAVENOUS ONCE
Status: COMPLETED | OUTPATIENT
Start: 2020-10-17 | End: 2020-10-17

## 2020-10-17 RX ORDER — SUCRALFATE ORAL 1 G/10ML
1 SUSPENSION ORAL
Status: DISCONTINUED | OUTPATIENT
Start: 2020-10-17 | End: 2020-10-17

## 2020-10-17 RX ORDER — HYDROMORPHONE HYDROCHLORIDE 1 MG/ML
0.5 INJECTION, SOLUTION INTRAMUSCULAR; INTRAVENOUS; SUBCUTANEOUS ONCE
Status: COMPLETED | OUTPATIENT
Start: 2020-10-17 | End: 2020-10-17

## 2020-10-17 RX ORDER — DEXTROSE MONOHYDRATE, SODIUM CHLORIDE, AND POTASSIUM CHLORIDE 50; 1.49; 4.5 G/1000ML; G/1000ML; G/1000ML
INJECTION, SOLUTION INTRAVENOUS CONTINUOUS
Status: DISCONTINUED | OUTPATIENT
Start: 2020-10-18 | End: 2020-10-18

## 2020-10-17 RX ORDER — HYDROMORPHONE HYDROCHLORIDE 1 MG/ML
0.5 INJECTION, SOLUTION INTRAMUSCULAR; INTRAVENOUS; SUBCUTANEOUS
Status: DISCONTINUED | OUTPATIENT
Start: 2020-10-17 | End: 2020-10-19

## 2020-10-17 RX ORDER — ONDANSETRON 2 MG/ML
4 INJECTION INTRAMUSCULAR; INTRAVENOUS EVERY 6 HOURS PRN
Status: DISCONTINUED | OUTPATIENT
Start: 2020-10-17 | End: 2020-10-20 | Stop reason: HOSPADM

## 2020-10-17 RX ORDER — LIDOCAINE 40 MG/G
CREAM TOPICAL
Status: DISCONTINUED | OUTPATIENT
Start: 2020-10-17 | End: 2020-10-20 | Stop reason: HOSPADM

## 2020-10-17 RX ORDER — PROMETHAZINE HYDROCHLORIDE 25 MG/ML
12.5 INJECTION INTRAMUSCULAR; INTRAVENOUS ONCE
Status: DISCONTINUED | OUTPATIENT
Start: 2020-10-17 | End: 2020-10-17

## 2020-10-17 RX ORDER — SUCRALFATE ORAL 1 G/10ML
1 SUSPENSION ORAL ONCE
Status: COMPLETED | OUTPATIENT
Start: 2020-10-17 | End: 2020-10-17

## 2020-10-17 RX ORDER — DEXTROSE MONOHYDRATE, SODIUM CHLORIDE, AND POTASSIUM CHLORIDE 50; 1.49; 4.5 G/1000ML; G/1000ML; G/1000ML
INJECTION, SOLUTION INTRAVENOUS CONTINUOUS
Status: DISCONTINUED | OUTPATIENT
Start: 2020-10-17 | End: 2020-10-18

## 2020-10-17 RX ORDER — NALOXONE HYDROCHLORIDE 0.4 MG/ML
.1-.4 INJECTION, SOLUTION INTRAMUSCULAR; INTRAVENOUS; SUBCUTANEOUS
Status: DISCONTINUED | OUTPATIENT
Start: 2020-10-17 | End: 2020-10-20 | Stop reason: HOSPADM

## 2020-10-17 RX ORDER — IOPAMIDOL 755 MG/ML
98 INJECTION, SOLUTION INTRAVASCULAR ONCE
Status: COMPLETED | OUTPATIENT
Start: 2020-10-17 | End: 2020-10-17

## 2020-10-17 RX ORDER — SODIUM CHLORIDE 9 MG/ML
INJECTION, SOLUTION INTRAVENOUS CONTINUOUS
Status: DISCONTINUED | OUTPATIENT
Start: 2020-10-17 | End: 2020-10-17

## 2020-10-17 RX ORDER — HYDROMORPHONE HYDROCHLORIDE 1 MG/ML
.3-.5 INJECTION, SOLUTION INTRAMUSCULAR; INTRAVENOUS; SUBCUTANEOUS
Status: DISCONTINUED | OUTPATIENT
Start: 2020-10-17 | End: 2020-10-20 | Stop reason: HOSPADM

## 2020-10-17 RX ORDER — ONDANSETRON 4 MG/1
4 TABLET, ORALLY DISINTEGRATING ORAL EVERY 6 HOURS PRN
Status: DISCONTINUED | OUTPATIENT
Start: 2020-10-17 | End: 2020-10-20 | Stop reason: HOSPADM

## 2020-10-17 RX ADMIN — LIDOCAINE HYDROCHLORIDE 30 ML: 20 SOLUTION ORAL; TOPICAL at 14:34

## 2020-10-17 RX ADMIN — POTASSIUM CHLORIDE, DEXTROSE MONOHYDRATE AND SODIUM CHLORIDE: 150; 5; 450 INJECTION, SOLUTION INTRAVENOUS at 21:50

## 2020-10-17 RX ADMIN — SODIUM CHLORIDE 1000 ML: 9 INJECTION, SOLUTION INTRAVENOUS at 13:43

## 2020-10-17 RX ADMIN — HYDROMORPHONE HYDROCHLORIDE 0.5 MG: 1 INJECTION, SOLUTION INTRAMUSCULAR; INTRAVENOUS; SUBCUTANEOUS at 17:46

## 2020-10-17 RX ADMIN — HYDROMORPHONE HYDROCHLORIDE 0.5 MG: 1 INJECTION, SOLUTION INTRAMUSCULAR; INTRAVENOUS; SUBCUTANEOUS at 21:57

## 2020-10-17 RX ADMIN — SUCRALFATE 1 G: 1 SUSPENSION ORAL at 17:48

## 2020-10-17 RX ADMIN — SODIUM CHLORIDE 1000 ML: 9 INJECTION, SOLUTION INTRAVENOUS at 17:45

## 2020-10-17 RX ADMIN — IOPAMIDOL 98 ML: 755 INJECTION, SOLUTION INTRAVENOUS at 17:34

## 2020-10-17 RX ADMIN — PANTOPRAZOLE SODIUM 40 MG: 40 INJECTION, POWDER, FOR SOLUTION INTRAVENOUS at 13:44

## 2020-10-17 RX ADMIN — ONDANSETRON 4 MG: 2 INJECTION INTRAMUSCULAR; INTRAVENOUS at 13:43

## 2020-10-17 RX ADMIN — SODIUM CHLORIDE: 9 INJECTION, SOLUTION INTRAVENOUS at 21:26

## 2020-10-17 RX ADMIN — HYDROMORPHONE HYDROCHLORIDE 0.5 MG: 1 INJECTION, SOLUTION INTRAMUSCULAR; INTRAVENOUS; SUBCUTANEOUS at 14:48

## 2020-10-17 RX ADMIN — SUCRALFATE 1 G: 1 SUSPENSION ORAL at 14:47

## 2020-10-17 RX ADMIN — SODIUM CHLORIDE 65 ML: 9 INJECTION, SOLUTION INTRAVENOUS at 17:35

## 2020-10-17 RX ADMIN — PROMETHAZINE HYDROCHLORIDE 12.5 MG: 25 INJECTION INTRAMUSCULAR; INTRAVENOUS at 17:59

## 2020-10-17 ASSESSMENT — MIFFLIN-ST. JEOR
SCORE: 1647.25
SCORE: 1663.44

## 2020-10-17 NOTE — ED NOTES
Pt presents to the Ed for complaint of epigastric pain. Pain has been ongoing for over 2 weeks. He was seen 2 weeks ago and diagnosed with gastritis. One episode of vomiting today. Has been taking his omeprazole as prescribed with minimal relief.

## 2020-10-17 NOTE — ED PROVIDER NOTES
"  History     Chief Complaint   Patient presents with     Abdominal Pain     vomiting x 1 this morning. was seen here 2 weeks ago for the same. did improve, now getting worse again.      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 Insidious onset of achy and occasionally stabbing epigastric pain began last anali, with emesis x 1 this am.  Pain is severe and radiates towards the back.  No evidence of GI bleeding.  No constipation or diarrhea.  No UTI signs or symptoms or hematuria.  No fever or chills.  He continues to drink alcohol daily and states that he drinks\"a couple of beers a day\"  He has an MR Abdomen MRCP w/o & w Contrast with Secretin scheduled on 11/13/20, ~ 1 month.      Previous Records Reviewed:  10/16/20:  Today's INR result of 2.10 is therapeutic.   CT ABDOMEN PELVIS W CONTRAST 10/3/2020      INDICATION: Epigastric and left upper quadrant abdominal pain.  COMPARISON: Multiple prior studies with most recent from 05/22/2019.  IMPRESSION:   1.  There remains marked wall thickening involving the stomach diffusely. Wall thickening near the gastric cardia and fundus has increased since the most recent available study along with wall thickening in the mid gastric body. Distal gastric wall   thickening at the level of the antrum and into the first portion of the duodenum, however, is decreased. Overall inflammatory change in the perigastric fat planes. Findings may represent persistent gastritis. Consider follow-up EGD if clinically necessary.  2.  No other areas of small bowel dilatation or obstruction. Postoperative changes of near-total colectomy with ileocolonic anastomosis in the deep pelvis.  3.  Postoperative " changes of a lower lumbar laminectomy with minimal edema in the soft tissues. No fluid collections.  4.  Distal pancreatic resection and splenectomy.   5.  Mild diffuse fatty infiltration of the liver.    [facility]   Discharge Summary  Hospital Medicine       Date of Admission:  10/3/2020  Date of Discharge:  10/5/2020       Identification and Chief Compaint: Antoine Russo is a 74 year old male who presented on 10/3/2020 with complaint of 2 days of epigastric pain.     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  He has a complex pancreatic history with previous distal pancreatectomy for an intraductal papillary mucinous neoplasm complicated by a pancreatic duct leak following this.  He had pancreatic biliary sphincterotomy and aspiration of the pseudocyst in 03/20/2019  Patient has a history of recurrent pancreatitis.  Last episode was 08/04/2020. Presents with 2 days of worsening epigastric, left upper quadrant pain along with N/V.  CT shows thickening and inflammation of the stomach, but no significant inflammation in the pancreas.  This appears to be most consistent with gastritis  Treated with IV pantoprazole 40 mg daily and patient did improve. Tolerated clear liquid diet and advanced to diabetic diet. Patient feels residual abdominal discomfort resolved when starting regular food. Will treat with oral PPI two months. If symptoms return, consider carafate and outpatient EGD. Suggested to reduce alcohol intake as this is risk factor for gastritis.     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 H/o tongue cancer surgery.        Acute recurrent pancreatitis 10/17/2020     Priority: Medium     Acute gastritis 10/03/2020     Priority: Medium     Peripheral polyneuropathy 05/12/2020     Priority: Medium     Chronic low back pain with sciatica, sciatica laterality unspecified, unspecified back pain laterality 05/12/2020     Priority: Medium     Alcohol dependence, uncomplicated (H) 02/04/2020     Priority: Medium     Acute right-sided low back pain with left-sided sciatica 02/04/2020     Priority: Medium     Spinal stenosis of lumbar region, unspecified whether neurogenic claudication present 02/04/2020     Priority: Medium     Pancreatic pseudocyst 04/18/2019     Priority: Medium     Acute pancreatitis 10/21/2018     Priority: Medium     Long term current use of anticoagulant therapy 04/05/2018     Priority: Medium     Recurrent pancreatitis 03/30/2018     Priority: Medium     Chronic atrial fibrillation (H) 01/23/2018     Priority: Medium     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 Please refer if Care Coordination services are needed.    Presenting Problem Signs and Symptoms Treatment Plan   Questions or concerns   during clinic hours   I will call my clinic directly:  [address], [place] [address]  [phone].   Questions or concerns outside clinic hours   I will call the 24 hour nurse line at   [phone] or [facility].   Need to schedule an appointment   I will call the 24 hour scheduling team at [phone] or my clinic directly at [phone].   Same day treatment     I will call my clinic first, nurse line if after hours, urgent care and express care if needed.   Clinic care coordination services (regular clinic hours)   I will call a clinic care coordinator directly:     Shelia Emanuel RN [facility]  [phone]    Brianna Cristobal SW:    [phone]    Or call my clinic at [phone] and ask to speak with care coordination.   Crisis Services: Behavioral or Mental Health  [facility] 24 Hour Phone Line  [phone]    [facility] 24 Hour Crisis Services  [phone]    [facility] (Behavioral Health Providers) Network [phone]    [facility]   [phone]     Emergency treatment -- Immediately    [facility] 911                Clostridium difficile enterocolitis 12/18/2012     Priority: Medium     Groin fluid collection 12/15/2012     Priority: Medium     CT 12/12- New (since 5/11) collection measuring at least 2.3 cm in diameter and 6.5 cm in length within the right inguinal canal. 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 Gerson Ravi and colleguchi at the  in 2012  NAD 2017  CT scan of the neck every 2-3 years.  - Thyroid labs yearly.  - Carotid ultrasound in three to four years to evaluate for stenosis.       Hyperlipidemia LDL goal <100 10/31/2010     Priority: Medium     CAD (coronary artery disease) 05/26/2009     Priority: Medium     Stress testing 2009 showed inferior wall ischemia.  Cath preformed; identified moderate CAD with stenosis of 40-50% in LAD and RCA.  No stents were placed.  Echo in 01/2018 (done while in Af[facility] with rates of 100-110); EF of 55-60%.  No RWMA.       GERD (gastroesophageal reflux disease) 05/26/2009     Priority: Medium     Hypertrophy of breast 09/25/2007     Priority: Medium     PERS HX TOBACCO USE - quit in 11/06 with chantix 03/15/2007     Priority: Medium     Hypertension, benign essential, goal below 140/90 11/07/2005     Priority: Medium     Patient has only fair bp control and with family history of diabetes will all ace if lab indicated also has bph and may op for psa and not digital exam next yr        Pain in joint, shoulder region 11/07/2005     Priority: Medium     Hypertrophy of prostate without urinary obstruction 11/07/2005     Priority: Medium     Problem list name updated by automated process. 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 difficile colitis      Colon polyp      Colon polyp 8/26/2011     Coronary artery disease      Diabetes mellitus (H)      Diverticulitis      Diverticulitis of colon 7/17/2007     Hypertension      Leukocytosis 4/17/2019     Malignant neoplasm (H)      Noninfectious ileitis      Recurrent pancreatitis        Past Surgical History:     Past Surgical History:   Procedure Laterality Date     BREAST SURGERY  2008    right breast mass benign     COLONOSCOPY      multiple polyps removed     COLONOSCOPY  8/24/2011    Procedure:COMBINED COLONOSCOPY, REMOVE TUMOR/POLYP/LESION BY SNARE; Surgeon:MILEY ARBOLEDA; Location:[facility]     COLONOSCOPY  12/17/2012    Procedure: COLONOSCOPY;;  Surgeon: Leon Maurer MD;  [facility]: [facility]     COLONOSCOPY  12/18/2012    Procedure: COLONOSCOPY;;  Surgeon: Leon Maurer MD;  [facility]     DENERVATION OF SPERMATIC CORD MICROSURGICAL Left 5/23/2017    Procedure: DENERVATION OF SPERMATIC CORD MICROSURGICAL;;  Surgeon: Marcio Aggarwal MD;  [facility]     DISSECTION RADICAL NECK BILATERAL  8/2/2012    Procedure: DISSECTION RADICAL NECK BILATERAL;;  Surgeon: Yung Alvares MD;  [facility]     ENDOSCOPIC RETROGRADE CHOLANGIOPANCREATOGRAM N/A 5/10/2016    Procedure: COMBINED ENDOSCOPIC RETROGRADE CHOLANGIOPANCREATOGRAPHY, PLACE TUBE/STENT;  Surgeon: Yovanny Beasley MD;  [facility]     ENDOSCOPIC RETROGRADE CHOLANGIOPANCREATOGRAM N/A 3/29/2018    Procedure: ENDOSCOPIC RETROGRADE CHOLANGIOPANCREATOGRAM;  Endoscopic Retrograde Cholangiopancreatogram, Endoscopic Ultrasound, Biliary Sphincterotomy, Biliary and Pancreatic Stent Placement;  Surgeon: Yovanny Beasley MD;  [facility]     ENDOSCOPIC ULTRASOUND UPPER GASTROINTESTINAL TRACT (GI) N/A 2/3/2016    Procedure: ENDOSCOPIC ULTRASOUND, ESOPHAGOSCOPY / UPPER GASTROINTESTINAL TRACT (GI);  Surgeon: Grabiel Plata MD;  [facility]     ENDOSCOPIC ULTRASOUND UPPER GASTROINTESTINAL TRACT (GI) N/A 3/29/2018    Procedure: ENDOSCOPIC ULTRASOUND, ESOPHAGOSCOPY / UPPER GASTROINTESTINAL TRACT (GI);;  Surgeon: Grabiel Plata MD;  [facility]     ESOPHAGOSCOPY, GASTROSCOPY, DUODENOSCOPY (EGD), COMBINED N/A 2/3/2016    Procedure: COMBINED ENDOSCOPIC ULTRASOUND, ESOPHAGOSCOPY, GASTROSCOPY, DUODENOSCOPY (EGD),  FINE NEEDLE ASPIRATE/BIOPSY;  Surgeon: Grabiel Plata MD;  [facility]     ESOPHAGOSCOPY, GASTROSCOPY, DUODENOSCOPY (EGD), COMBINED N/A 6/8/2016    Procedure: COMBINED ESOPHAGOSCOPY, GASTROSCOPY, DUODENOSCOPY (EGD), REMOVE FOREIGN BODY;  Surgeon: Yovanny Beasley MD;  [facility]     ESOPHAGOSCOPY, GASTROSCOPY, DUODENOSCOPY (EGD), COMBINED N/A 4/17/2018    Procedure: COMBINED ESOPHAGOSCOPY, GASTROSCOPY, DUODENOSCOPY (EGD), REMOVE FOREIGN BODY;  EGD with stent removal;  Surgeon: Grabiel Plata MD;  [facility]     EXCISE LESION INTRAORAL  6/14/2012    Procedure: EXCISE LESION INTRAORAL;  Wide Local Excision Floor of Mouth, Direct Laryngoscopy, Bilateral [place]'s Marsuplization, Split Thickness Skin Graft from right Thigh  Latex Safe;  Surgeon: Gerson Ravi MD;  [facility]     EXCISE LESION INTRAORAL  8/2/2012    Procedure: EXCISE LESION INTRAORAL;  Floor of Mouth Resection, Bilateral Selective Radical Neck Dissection, Tracheostomy, Left Radial Forearm  Free Flap with Alloderm, Nasogastric Feeding Tube Placement,    * Latex Safe*;  Surgeon: Gerson Ravi MD;  [facility]     EXCISE LESION INTRAORAL  12/11/2012    Procedure: EXCISE LESION INTRAORAL;  takedown of oral flap;  Surgeon: Yung Alvares MD;  [facility]     GRAFT FREE VASCULARIZED (LOCATION)  8/2/2012    Procedure: GRAFT FREE VASCULARIZED (LOCATION);;  Surgeon: Yung Alvares MD;  [facility]     GRAFT SKIN SPLIT THICKNESS FROM EXTREMITY  6/14/2012    Procedure: GRAFT SKIN SPLIT THICKNESS FROM EXTREMITY;;  Surgeon: Gerson Ravi MD;  [facility]     LAPAROSCOPIC ILEOSTOMY TAKEDOWN  6/6/2013    Procedure: LAPAROSCOPIC ILEOSTOMY TAKEDOWN;  Laparoscopic Closure of Enterostomy, Guerda's Type with IleoRectal Anastomosis ;  Surgeon: Grabiel Riddle MD;  [facility]     LAPAROTOMY EXPLORATORY  12/20/2012    Procedure: LAPAROTOMY EXPLORATORY;  Exploratory Laparotomy, total abdominal  "colectomy, ileostomy formation;  Surgeon: Miquel Cannon MD;  [facility]     LARYNGOSCOPY  2012    Procedure: LARYNGOSCOPY;;  Surgeon: Gerson Ravi MD;  [facility]     ORTHOPEDIC SURGERY      ganglian cyst left ankle     PANCREATECTOMY, SPLENECTOMY N/A 3/10/2016    Procedure: PANCREATECTOMY, SPLENECTOMY;  Surgeon: Nael Abel MD;  [facility]     SHOULDER SURGERY  ,     2006- right rotator cuff,  bone spur on left. Dr. Hdez     VARICOCELECTOMY Left 2017    Procedure: VARICOCELECTOMY;  Left Varicocele Repair, Denervation of Left Testis;  Surgeon: Marcio Aggarwal MD;  [facility]       Family History:    Family History   Problem Relation Age of Onset     Diabetes Sister         onset age 50     Alzheimer Disease Mother          80     Alzheimer Disease Father          85     Diabetes Other         nephew type 1     Diabetes Other      Aneurysm Sister      Anesthesia Reaction No family hx of      Colon Cancer No family hx of      Colon Polyps No family hx of      Crohn's Disease No family hx of      Ulcerative Colitis No family hx of        Social History:  Marital Status:   [2]  Social History     Tobacco Use     Smoking status: Former Smoker     Packs/day: 1.00     Years: 40.00     Pack years: 40.00     Types: Cigarettes     Quit date: 2006     Years since quittin.9     Smokeless tobacco: Never Used   Substance Use Topics     Alcohol use: Not Currently     Comment: \"1 beer on Thanksgiving day\"     Drug use: Yes     Comment: 3 beers daily        Medications:         Continuous Blood Gluc  (FREESTYLE LISA 14 DAY READER) JOSE ALBERTO       Continuous Blood Gluc Sensor (FREESTYLE LISA 14 DAY SENSOR) MISC       insulin isophane human (NOVOLIN N FLEXPEN RELION) 100 UNIT/ML injection       lisinopril (PRINIVIL/ZESTRIL) 5 MG tablet       metoprolol succinate ER (TOPROL-XL) 50 MG 24 hr tablet       multivitamin, therapeutic with minerals " "(THERA-VIT-M) TABS       pantoprazole (PROTONIX) 40 MG EC tablet       pregabalin (LYRICA) 75 MG capsule       rosuvastatin (CRESTOR) 10 MG tablet       tamsulosin (FLOMAX) 0.4 MG capsule       vitamin B-12 (CYANOCOBALAMIN) 100 MCG tablet       warfarin ANTICOAGULANT (COUMADIN) 2.5 MG tablet       insulin pen needle (BD LUIS U/F) 32G X 4 MM miscellaneous          Review of Systems  As mentioned above in the history present illness.  All other systems were reviewed and are negative.    Physical Exam   BP: (!) 161/90  Pulse: 100  Temp: 97.7  F (36.5  C)  Resp: 20  Height: 177.8 cm (5' 10\")  Weight: 90.7 kg (200 lb)  SpO2: 93 %      Physical Exam  Vitals signs and nursing note reviewed.   Constitutional:       General: He is not in acute distress.     Appearance: Normal appearance. He is well-developed. He is not ill-appearing or diaphoretic.   HENT:      Head: Normocephalic and atraumatic.      Right Ear: External ear normal.      Left Ear: External ear normal.      Nose: Nose normal.      Mouth/Throat:      Mouth: Mucous membranes are moist.   Eyes:      General: No scleral icterus.     Extraocular Movements: Extraocular movements intact.      Conjunctiva/sclera: Conjunctivae normal.   Neck:      Musculoskeletal: Normal range of motion and neck supple.      Trachea: No tracheal deviation.   Cardiovascular:      Rate and Rhythm: Normal rate and regular rhythm.      Pulses: Normal pulses.      Heart sounds: Normal heart sounds. No murmur. No friction rub. No gallop.    Pulmonary:      Effort: Pulmonary effort is normal. No respiratory distress.      Breath sounds: Normal breath sounds. No wheezing, rhonchi or rales.   Abdominal:      General: Bowel sounds are normal. There is no distension or abdominal bruit.      Palpations: Abdomen is soft. There is no pulsatile mass.      Tenderness: There is abdominal tenderness in the epigastric area. There is no right CVA tenderness, left CVA tenderness, guarding or rebound. " Negative signs include Castrejon's sign and McBurney's sign.   Musculoskeletal: Normal range of motion.         General: No tenderness.      Right lower leg: No edema.      Left lower leg: No edema.   Skin:     General: Skin is warm and dry.      Coloration: Skin is not pale.      Findings: No erythema or rash.   Neurological:      General: No focal deficit present.      Mental Status: He is alert and oriented to person, place, and time.      Coordination: Coordination normal.   Psychiatric:         Mood and Affect: Mood normal.         Behavior: Behavior normal.         ED Course        Procedures               Results for orders placed or performed during the hospital encounter of 10/17/20 (from the past 24 hour(s))   Comprehensive metabolic panel   Result Value Ref Range    Sodium 136 133 - 144 mmol/L    Potassium 4.4 3.4 - 5.3 mmol/L    Chloride 101 94 - 109 mmol/L    Carbon Dioxide 32 20 - 32 mmol/L    Anion Gap 3 3 - 14 mmol/L    Glucose 185 (H) 70 - 99 mg/dL    Urea Nitrogen 14 7 - 30 mg/dL    Creatinine 1.12 0.66 - 1.25 mg/dL    GFR Estimate 65 >60 mL/min/[1.73_m2]    GFR Estimate If Black 75 >60 mL/min/[1.73_m2]    Calcium 10.0 8.5 - 10.1 mg/dL    Bilirubin Total 0.6 0.2 - 1.3 mg/dL    Albumin 3.4 3.4 - 5.0 g/dL    Protein Total 8.1 6.8 - 8.8 g/dL    Alkaline Phosphatase 77 40 - 150 U/L    ALT 22 0 - 70 U/L    AST 14 0 - 45 U/L   Troponin I   Result Value Ref Range    Troponin I ES <0.015 0.000 - 0.045 ug/L   CBC with platelets differential   Result Value Ref Range    WBC 14.0 (H) 4.0 - 11.0 10e9/L    RBC Count 5.02 4.4 - 5.9 10e12/L    Hemoglobin 15.6 13.3 - 17.7 g/dL    Hematocrit 48.3 40.0 - 53.0 %    MCV 96 78 - 100 fl    MCH 31.1 26.5 - 33.0 pg    MCHC 32.3 31.5 - 36.5 g/dL    RDW 16.6 (H) 10.0 - 15.0 %    Platelet Count 336 150 - 450 10e9/L    Diff Method Manual Differential     % Neutrophils 76.0 %    % Lymphocytes 16.0 %    % Monocytes 8.0 %    % Eosinophils 0.0 %    % Basophils 0.0 %    Absolute  Neutrophil 10.6 (H) 1.6 - 8.3 10e9/L    Absolute Lymphocytes 2.2 0.8 - 5.3 10e9/L    Absolute Monocytes 1.1 0.0 - 1.3 10e9/L    Absolute Eosinophils 0.0 0.0 - 0.7 10e9/L    Absolute Basophils 0.0 0.0 - 0.2 10e9/L    Anisocytosis Slight     Platelet Estimate       Automated count confirmed.  Platelet morphology is normal.   Lipase   Result Value Ref Range    Lipase 1,011 (H) 73 - 393 U/L   INR   Result Value Ref Range    INR 2.20 (H) 0.86 - 1.14   CT Abdomen Pelvis w Contrast    Narrative    EXAM: CT ABDOMEN PELVIS W CONTRAST  LOCATION: [facility]  DATE/TIME: 10/17/2020 5:15 PM    INDICATION: Epigastric pain. Elevated lipase. History of pancreatitis.  COMPARISON: 10/03/2020  TECHNIQUE: CT scan of the abdomen and pelvis was performed following injection of IV contrast. Multiplanar reformats were obtained. Dose reduction techniques were used.  CONTRAST: 98mL Isovue-370    FINDINGS:   LOWER CHEST: Minimal linear scarring.    HEPATOBILIARY: Diffuse fatty infiltration of liver. Gallbladder and bile ducts unremarkable.    PANCREAS: Prior distal pancreatectomy. Mild amount of soft tissue stranding surrounds the pancreatic neck and residual body but the degree of peripancreatic inflammation has improved compared to the previous exam. There remains wall thickening of the   adjacent posterior aspect of stomach, again improved compared the previous study. There is no focal peripancreatic fluid collection. Pancreatic head appears normal.    SPLEEN: Prior splenectomy.    ADRENAL GLANDS: Normal.    KIDNEYS/BLADDER: Fetal lobulation with multiple small benign cysts unchanged. There is one small indeterminate 1 cm exophytic cyst lateral left lower pole (series 3 image 99) which is unchanged compared to recent study but is actually smaller compared to   exam of 11/24/2018 and should be benign.    BOWEL: Prior subtotal colectomy with stable postoperative findings, no bowel obstruction.    LYMPH NODES:  Normal.    VASCULATURE: Heavy atherosclerotic calcifications.    PELVIC ORGANS: Normal.    MUSCULOSKELETAL: Prior decompression laminectomy.      Impression    IMPRESSION:   1.  Compared to exam of 2 weeks ago there is been improvement; decrease in peripancreatic inflammation and decreasing inflammatory wall thickening of the adjacent stomach.  2.  Stable postoperative changes after splenectomy and distal pancreatectomy. Stable postoperative appearances of bowel.  3.  No new abnormality.   Glucose by meter   Result Value Ref Range    Glucose 129 (H) 70 - 99 mg/dL     Lipase   Date Value Ref Range Status   10/17/2020 1,011 (H) 73 - 393 U/L Final   10/03/2020 1,072 (H) 73 - 393 U/L Final   08/06/2020 464 (H) 73 - 393 U/L Final   08/05/2020 700 (H) 73 - 393 U/L Final   08/04/2020 1,317 (H) 73 - 393 U/L Final       Medications   dextrose 5% and 0.45% NaCl + KCl 20 mEq/L infusion ( Intravenous New Bag 10/17/20 2150)   HYDROmorphone (PF) (DILAUDID) injection 0.5 mg (has no administration in time range)   0.9% sodium chloride BOLUS (0 mLs Intravenous Stopped 10/17/20 1510)   ondansetron (ZOFRAN) injection 4 mg (4 mg Intravenous Given 10/17/20 1343)   pantoprazole (PROTONIX) IV push injection 40 mg (40 mg Intravenous Given 10/17/20 1344)   lidocaine (XYLOCAINE) 2 % 15 mL, alum & mag hydroxide-simethicone (MAALOX  ES) 15 mL GI Cocktail (30 mLs Oral Given 10/17/20 1434)   HYDROmorphone (PF) (DILAUDID) injection 0.5 mg (0.5 mg Intravenous Given 10/17/20 1448)   iopamidol (ISOVUE-370) solution 98 mL (98 mLs Intravenous Given 10/17/20 1734)   sodium chloride 0.9 % bag 500mL for CT scan flush use (65 mLs Intravenous Given 10/17/20 1735)   HYDROmorphone (PF) (DILAUDID) injection 0.5 mg (0.5 mg Intravenous Given 10/17/20 1746)   0.9% sodium chloride BOLUS (0 mLs Intravenous Stopped 10/17/20 1909)   sucralfate (CARAFATE) suspension 1 g (1 g Oral Given 10/17/20 3324)   promethazine (PHENERGAN) 12.5 mg in sodium chloride 0.9 % 50 mL  intermittent infusion (12.5 mg Intravenous Given 10/17/20 1759)   HYDROmorphone (PF) (DILAUDID) injection 0.5 mg (0.5 mg Intravenous Given 10/17/20 2157)     Discussed CT imaging he would like to defer this, which appears clinically appropriate and acceptable as this is a typical recurrence of epigastric pain felt to previously be due to recurrent pancreatitis and/or gastritis.  He has no concerning or new characteristics of his symptoms or presentation.    2:25 PM - We reviewed the disposition plan and I recommended transfer to [facility] where GI services are available and where he can receive his scheduled MRI of the abdomen MRCP study with Secretin, which has been ordered by his GI specialist there.  No beds are currently available here.    Minimal improvement of epigastric pain with a GI cocktail of antacid viscous lidocaine.    No beds available at [facility].    I reviewed the case with Dr. King, Hospitalist on-call at [facility].  She accepted care of the patient in transfer there.    4:45 PM -[facility] has notified us that they are also unable to perform an MRI of the abdomen MRCP study with Secretin.  [facility] may have a bed available soon and I reviewed the case with Dr. Mireles who accepted care of the patient in transfer to [facility] when a bed becomes available there. He recommended a CT study prior to transfer.    We have been notified that there will not be a bed at [facility].  CT study showed improving patric-pancreatic inflammation as compared to his most recent study from 10/3/2020.    9:39 PM - Patient's been resting comfortably.  Abdominal pain 3/10.  No further vomiting.     10:00 PM - We will be able to accommodate the patient with admission here tonight. Reviewed the case with Dr. Barrientos, hospitalist on-call.  He accepted the patient upon admission here.    Assessments & Plan (with Medical  Decision Making)   74-year-old male with acute recurrent pancreatitis, second episode this month, with symptoms which just began in the past 24 hours.  Lipase elevated at 1,011 and CT evaluation shows decreased peripancreatic inflammation and decreased adjacent gastric wall inflammation versus last CT study 10/3/2020.  Pain was controlled with intermittent doses of Dilaudid 0.5 mg IV, he was given a 2 L IV fluid bolus and increase maintenance rate to 100 mL/h administered.  He is followed by the GI service at [facility], but unfortunately have no beds available there.  They have ordered an outpatient MR abdomen MRCP study with Secretin administration to be performed there on 11/13/2020, and approximately 1 month.  For gastritis he was given Protonix 40 mg IV, GI cocktail of antacid viscous lidocaine and sucralfate.  He had only mild improvement in pain with the GI cocktail of antacid and viscous lidocaine.  We will admit him to our facility for supportive care with consideration for transfer where GI services are available as needed.    I have reviewed the nursing notes.    I have reviewed the findings, diagnosis, plan and need for follow up with the patient.    New Prescriptions    No medications on file       Final diagnoses:   Acute recurrent pancreatitis   Acute gastritis without hemorrhage, unspecified gastritis type   Alcohol dependence, uncomplicated (H)       10/17/2020   [facility] EMERGENCY DEPT     Nelly, Don LOPEZ MD  10/17/20 [id number]

## 2020-10-18 LAB
GLUCOSE BLDC GLUCOMTR-MCNC: 106 MG/DL (ref 70–99)
GLUCOSE BLDC GLUCOMTR-MCNC: 118 MG/DL (ref 70–99)
GLUCOSE BLDC GLUCOMTR-MCNC: 120 MG/DL (ref 70–99)
GLUCOSE BLDC GLUCOMTR-MCNC: 160 MG/DL (ref 70–99)
GLUCOSE BLDC GLUCOMTR-MCNC: 79 MG/DL (ref 70–99)
GLUCOSE BLDC GLUCOMTR-MCNC: 94 MG/DL (ref 70–99)
GLUCOSE BLDC GLUCOMTR-MCNC: 98 MG/DL (ref 70–99)
SARS-COV-2 RNA SPEC QL NAA+PROBE: NOT DETECTED
SPECIMEN SOURCE: NORMAL

## 2020-10-18 PROCEDURE — 258N000003 HC RX IP 258 OP 636: Performed by: EMERGENCY MEDICINE

## 2020-10-18 PROCEDURE — 258N000003 HC RX IP 258 OP 636: Performed by: INTERNAL MEDICINE

## 2020-10-18 PROCEDURE — 99223 1ST HOSP IP/OBS HIGH 75: CPT | Mod: AI | Performed by: FAMILY MEDICINE

## 2020-10-18 PROCEDURE — 250N000011 HC RX IP 250 OP 636: Performed by: EMERGENCY MEDICINE

## 2020-10-18 PROCEDURE — C9113 INJ PANTOPRAZOLE SODIUM, VIA: HCPCS | Performed by: EMERGENCY MEDICINE

## 2020-10-18 PROCEDURE — 999N001017 HC STATISTIC GLUCOSE BY METER IP

## 2020-10-18 PROCEDURE — 250N000013 HC RX MED GY IP 250 OP 250 PS 637: Performed by: FAMILY MEDICINE

## 2020-10-18 PROCEDURE — 250N000012 HC RX MED GY IP 250 OP 636 PS 637: Performed by: FAMILY MEDICINE

## 2020-10-18 PROCEDURE — 99232 SBSQ HOSP IP/OBS MODERATE 35: CPT | Performed by: INTERNAL MEDICINE

## 2020-10-18 PROCEDURE — 258N000003 HC RX IP 258 OP 636: Performed by: FAMILY MEDICINE

## 2020-10-18 PROCEDURE — 120N000001 HC R&B MED SURG/OB

## 2020-10-18 RX ORDER — PANTOPRAZOLE SODIUM 20 MG/1
40 TABLET, DELAYED RELEASE ORAL DAILY
Status: DISCONTINUED | OUTPATIENT
Start: 2020-10-18 | End: 2020-10-20

## 2020-10-18 RX ORDER — LISINOPRIL 5 MG/1
5 TABLET ORAL DAILY
Status: DISCONTINUED | OUTPATIENT
Start: 2020-10-18 | End: 2020-10-20 | Stop reason: HOSPADM

## 2020-10-18 RX ORDER — TAMSULOSIN HYDROCHLORIDE 0.4 MG/1
0.4 CAPSULE ORAL DAILY
Status: DISCONTINUED | OUTPATIENT
Start: 2020-10-18 | End: 2020-10-20 | Stop reason: HOSPADM

## 2020-10-18 RX ORDER — SODIUM CHLORIDE 9 MG/ML
INJECTION, SOLUTION INTRAVENOUS CONTINUOUS
Status: DISCONTINUED | OUTPATIENT
Start: 2020-10-18 | End: 2020-10-18 | Stop reason: ALTCHOICE

## 2020-10-18 RX ORDER — NICOTINE POLACRILEX 4 MG
15-30 LOZENGE BUCCAL
Status: DISCONTINUED | OUTPATIENT
Start: 2020-10-18 | End: 2020-10-20 | Stop reason: HOSPADM

## 2020-10-18 RX ORDER — DEXTROSE MONOHYDRATE 25 G/50ML
25-50 INJECTION, SOLUTION INTRAVENOUS
Status: DISCONTINUED | OUTPATIENT
Start: 2020-10-18 | End: 2020-10-20 | Stop reason: HOSPADM

## 2020-10-18 RX ORDER — ROSUVASTATIN CALCIUM 5 MG/1
10 TABLET, COATED ORAL DAILY
Status: DISCONTINUED | OUTPATIENT
Start: 2020-10-18 | End: 2020-10-20 | Stop reason: HOSPADM

## 2020-10-18 RX ADMIN — SODIUM CHLORIDE: 9 INJECTION, SOLUTION INTRAVENOUS at 10:37

## 2020-10-18 RX ADMIN — DEXTROSE AND SODIUM CHLORIDE: 5; 900 INJECTION, SOLUTION INTRAVENOUS at 19:13

## 2020-10-18 RX ADMIN — HYDROMORPHONE HYDROCHLORIDE 0.5 MG: 1 INJECTION, SOLUTION INTRAMUSCULAR; INTRAVENOUS; SUBCUTANEOUS at 22:42

## 2020-10-18 RX ADMIN — ONDANSETRON 4 MG: 4 TABLET, ORALLY DISINTEGRATING ORAL at 10:36

## 2020-10-18 RX ADMIN — ROSUVASTATIN CALCIUM 10 MG: 5 TABLET, FILM COATED ORAL at 07:55

## 2020-10-18 RX ADMIN — SODIUM CHLORIDE: 9 INJECTION, SOLUTION INTRAVENOUS at 00:59

## 2020-10-18 RX ADMIN — SODIUM CHLORIDE: 9 INJECTION, SOLUTION INTRAVENOUS at 17:46

## 2020-10-18 RX ADMIN — PANTOPRAZOLE SODIUM 40 MG: 40 INJECTION, POWDER, FOR SOLUTION INTRAVENOUS at 06:29

## 2020-10-18 RX ADMIN — INSULIN ASPART 1 UNITS: 100 INJECTION, SOLUTION INTRAVENOUS; SUBCUTANEOUS at 03:12

## 2020-10-18 RX ADMIN — HYDROMORPHONE HYDROCHLORIDE 0.5 MG: 1 INJECTION, SOLUTION INTRAMUSCULAR; INTRAVENOUS; SUBCUTANEOUS at 12:43

## 2020-10-18 RX ADMIN — HYDROMORPHONE HYDROCHLORIDE 0.5 MG: 1 INJECTION, SOLUTION INTRAMUSCULAR; INTRAVENOUS; SUBCUTANEOUS at 02:36

## 2020-10-18 RX ADMIN — TAMSULOSIN HYDROCHLORIDE 0.4 MG: 0.4 CAPSULE ORAL at 07:55

## 2020-10-18 RX ADMIN — LISINOPRIL 5 MG: 5 TABLET ORAL at 07:55

## 2020-10-18 RX ADMIN — METOPROLOL SUCCINATE 75 MG: 25 TABLET, EXTENDED RELEASE ORAL at 07:54

## 2020-10-18 RX ADMIN — POTASSIUM CHLORIDE, DEXTROSE MONOHYDRATE AND SODIUM CHLORIDE: 150; 5; 450 INJECTION, SOLUTION INTRAVENOUS at 00:02

## 2020-10-18 RX ADMIN — HYDROMORPHONE HYDROCHLORIDE 0.5 MG: 1 INJECTION, SOLUTION INTRAMUSCULAR; INTRAVENOUS; SUBCUTANEOUS at 19:58

## 2020-10-18 RX ADMIN — METOPROLOL SUCCINATE 75 MG: 25 TABLET, EXTENDED RELEASE ORAL at 20:00

## 2020-10-18 RX ADMIN — HYDROMORPHONE HYDROCHLORIDE 0.5 MG: 1 INJECTION, SOLUTION INTRAMUSCULAR; INTRAVENOUS; SUBCUTANEOUS at 07:54

## 2020-10-18 RX ADMIN — PANTOPRAZOLE SODIUM 40 MG: 20 TABLET, DELAYED RELEASE ORAL at 07:55

## 2020-10-18 RX ADMIN — METOPROLOL SUCCINATE 75 MG: 25 TABLET, EXTENDED RELEASE ORAL at 01:02

## 2020-10-18 SDOH — ECONOMIC STABILITY: TRANSPORTATION INSECURITY
IN THE PAST 12 MONTHS, HAS THE LACK OF TRANSPORTATION KEPT YOU FROM MEDICAL APPOINTMENTS OR FROM GETTING MEDICATIONS?: NO

## 2020-10-18 SDOH — ECONOMIC STABILITY: TRANSPORTATION INSECURITY
IN THE PAST 12 MONTHS, HAS LACK OF TRANSPORTATION KEPT YOU FROM MEETINGS, WORK, OR FROM GETTING THINGS NEEDED FOR DAILY LIVING?: NO

## 2020-10-18 SDOH — HEALTH STABILITY: MENTAL HEALTH: HOW MANY STANDARD DRINKS CONTAINING ALCOHOL DO YOU HAVE ON A TYPICAL DAY?: 3 OR 4

## 2020-10-18 SDOH — ECONOMIC STABILITY: INCOME INSECURITY: HOW HARD IS IT FOR YOU TO PAY FOR THE VERY BASICS LIKE FOOD, HOUSING, MEDICAL CARE, AND HEATING?: NOT HARD AT ALL

## 2020-10-18 SDOH — HEALTH STABILITY: MENTAL HEALTH: HOW OFTEN DO YOU HAVE 6 OR MORE DRINKS ON ONE OCCASION?: WEEKLY

## 2020-10-18 SDOH — ECONOMIC STABILITY: FOOD INSECURITY: WITHIN THE PAST 12 MONTHS, THE FOOD YOU BOUGHT JUST DIDN'T LAST AND YOU DIDN'T HAVE MONEY TO GET MORE.: NEVER TRUE

## 2020-10-18 SDOH — HEALTH STABILITY: MENTAL HEALTH: HOW OFTEN DO YOU HAVE A DRINK CONTAINING ALCOHOL?: 4 OR MORE TIMES A WEEK

## 2020-10-18 SDOH — ECONOMIC STABILITY: FOOD INSECURITY: WITHIN THE PAST 12 MONTHS, YOU WORRIED THAT YOUR FOOD WOULD RUN OUT BEFORE YOU GOT MONEY TO BUY MORE.: NEVER TRUE

## 2020-10-18 ASSESSMENT — ACTIVITIES OF DAILY LIVING (ADL)
ADLS_ACUITY_SCORE: 11

## 2020-10-18 NOTE — H&P
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  Patient states his pain this time is similar to previous episodes of abdominal pain.  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  Patient continues to drink alcohol regularly, last drink 2 days ago.  No history of alcohol withdrawal.  In the ED lipase was found to be elevated at 1100, white blood count elevated at 14.  CT abdomen pelvis showed:  1.  Compared to exam of 2 weeks ago there is been improvement; decrease in peripancreatic inflammation and decreasing inflammatory wall thickening of the adjacent stomach.  2.  Stable postoperative changes after splenectomy and distal pancreatectomy. Stable postoperative appearances of bowel.  3.  No new abnormality.  Patient was given 2 L IV fluids and his pain was controlled with Dilaudid 0.5 mg.     The initial plan of the ED provider was to transfer him to [facility] for GI consult and possibly obtain MRCP study with secretin administration but unfortunately there was no beds available.  Patient was admitted to our service for supportive care with consideration of transfer to [facility] for further work-up  Patient was supposed to have MRCP with secretin study on  11/13/2020  Plan:  - Admission to inpatient  - Continue IVF  - NPO  - pain control  -Consider transfer to the [facility] for further work-up      Hypertension, benign essential, goal below 140/90    Continue home medication lisinopril and Toprol-XL    Peripheral vascular disease (H)    CAD (coronary artery disease)  Cath showed moderate coronary disease in 2009, no stents placed  Continue home Crestor 10 mg daily      Type 2 diabetes mellitus with diabetic polyneuropathy, without long-term current use of insulin (H)  Home medication Novolin 34 units in the morning and 32 units at night  Patient is n.p.o.  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  Pharmacy to dose    Hypertrophy of prostate without urinary obstruction  Resume home Flomax    Hyperlipidemia LDL goal <100  Chronic stable problem.  Resume home Crestor 10 mg.    Alcohol dependence, uncomplicated (H)  No history of alcohol withdrawal  Last drink 2 days ago.       Diet: NPO for Medical/Clinical Reasons Except for: Meds, Ice Chips  DVT Prophylaxis: Warfarin  Chavez Catheter: not present  Code Status:   Full code  Lines: peripheral IV Line     Disposition Plan   Expected discharge: 2 - 3 days, recommended to prior living arrangement once adequate pain management/ tolerating PO medications and safe disposition plan/ TCU bed available.  Entered: Gita Barrientos MD 10/18/2020, 12:41 AM     Status: Patient is appropriate for Inpatient   Gita Barrientos MD        The patient's care was discussed with the Patient.    Primary Care Physician   Katie Martino [phone]    History is obtained from the patient,  and review of old records via the EMR.    History of Present  Illness   Antoine Russo is a 74 year old male with past medical history of recurrent pancreatitis, chronic A. fib on Coumadin therapy, diabetes mellitus, CAD, hypertension and alcohol abuse now presents on 10/17/2020 with epigastric pain started yesterday associated with nausea and one episode of nonbloody vomiting today.  Patient states his pain this time is similar to previous episodes of abdominal pain.  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  Patient continues to drink alcohol regularly, last drink 2 days ago.  No history of alcohol withdrawal.  In the ED lipase was found to be elevated at 1100, white blood count elevated at 14.  CT abdomen pelvis showed:  1.  Compared to exam of 2 weeks ago there is been improvement; decrease in peripancreatic inflammation and decreasing inflammatory wall thickening of the adjacent stomach.  2.  Stable postoperative changes after splenectomy and distal pancreatectomy. Stable postoperative appearances of bowel.  3.  No new abnormality.  Patient was given 2 L IV fluids and his pain was controlled with Dilaudid 0.5 mg.  Patient was interviewed and examined at bedside.  He states he is feeling better his pain is controlled.  The initial plan of the ED provider was to transfer him to [facility] for GI consult and possibly obtain MRCP study with secretin administration but unfortunately there was no beds available.  Patient was admitted to our service for supportive care with consideration of transfer to [facility] for further work-up    Review of Systems   The 10 point Review of Systems is negative other than noted in the HPI or here.     Past Medical History      Past Medical History:   Diagnosis Date     Acute kidney injury (H) 4/17/2019     Acute on chronic pancreatitis (H) 1/23/2018     Bacteriuria with pyuria  4/17/2019     C. difficile colitis      Colon polyp      Colon polyp 8/26/2011    Colonoscopy 8/2011-A sessile polyp was found in the cecum. The polyp was 6 mm in size. The polyp was removed with a hot snare. Resection and retrieval were complete. A sessile polyp was found in the proximal transverse colon. The polyp was 15 mm in size. The polyp was removed with a hot snare. Resection and retrieval were complete. A sessile polyp was found in the sigmoid colon. The polyp was 5 mm     Coronary artery disease      Diabetes mellitus (H)     type 2     Diverticulitis      Diverticulitis of colon 7/17/2007    Colitis on CT Scan 5/2011- [place] Colonoscopy 8/2011 Diverticulitis - Multiple small and large-mouthed diverticula were found in the mid sigmoid colon and at the hepatic flexure. There was narrowing of the colon in association with the diverticular opening. Nena-diverticular erythema was seen. There was evidence of an impacted diverticulum. 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     Noninfectious ileitis      Recurrent pancreatitis      Patient Active Problem List    Diagnosis Date Noted     Acute recurrent pancreatitis 10/17/2020     Priority: High     Acute gastritis 10/03/2020     Priority: High     Acute pancreatitis 10/21/2018     Priority: High     Anticipated difficulty with intubation 05/23/2017     Priority: High     Class: Chronic     Difficult two hands mask ventilation, intubated multiple times asleep with video laryngoscope. H/o tongue cancer surgery.        Peripheral polyneuropathy 05/12/2020     Priority: Medium     Chronic low back pain with sciatica, sciatica laterality unspecified, unspecified back pain laterality 05/12/2020     Priority: Medium     Acute right-sided low back pain with left-sided sciatica 02/04/2020     Priority: Medium     Spinal stenosis of lumbar region,  unspecified whether neurogenic claudication present 02/04/2020     Priority: Medium     Pancreatic pseudocyst 04/18/2019     Priority: Medium     Long term current use of anticoagulant therapy 04/05/2018     Priority: Medium     Recurrent pancreatitis 03/30/2018     Priority: Medium     Chronic atrial fibrillation (H) 01/23/2018     Priority: Medium     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 Please refer if Care Coordination services are needed.    Presenting Problem Signs and Symptoms Treatment Plan   Questions or concerns   during clinic hours   I will call my clinic directly:  [address], [place] [address]  [phone].   Questions or concerns outside clinic hours   I will call the 24 hour nurse line at   [phone] or [facility].   Need to schedule an appointment   I will call the 24 hour scheduling team at [phone] or my clinic directly at [phone].   Same day treatment     I will call my clinic first, nurse line if after hours, urgent care and express care if needed.   Clinic care coordination services (regular clinic hours)   I will call a clinic care coordinator directly:     Shelia Emanuel RN [facility]  [phone]    Brianna Cristobal SW:    [phone]    Or call my clinic at [phone] and ask to speak with care coordination.   Crisis Services: Behavioral or Mental Health  [facility] 24 Hour Phone Line  [phone]    [facility]  [phone]    [facility] [phone]    [facility]   [phone]     Emergency treatment -- Immediately    [facility] 911                Clostridium difficile enterocolitis 12/18/2012     Priority: Medium     Groin fluid collection 12/15/2012     Priority: Medium     CT 12/12- New (since 5/11) collection measuring at least 2.3 cm in diameter and 6.5 cm in length within the right inguinal canal. 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 Gerson Ravi and aristides at the  in 2012  NAD 2017  CT scan of the neck every 2-3 years.  - Thyroid labs yearly.  - Carotid ultrasound in three to four years to evaluate for stenosis.       CAD (coronary artery disease) 05/26/2009     Priority: Medium     Stress testing 2009 showed inferior wall ischemia.  Cath preformed; identified moderate CAD with stenosis of 40-50% in LAD and RCA.  No stents were placed.  Echo in 01/2018 (done while in Afib with rates of 100-110); EF of 55-60%.  No RWMA.       GERD (gastroesophageal reflux disease) 05/26/2009     Priority: Medium     Hypertrophy of breast 09/25/2007     Priority: Medium     PERS HX TOBACCO USE - quit in 11/06 with chantix 03/15/2007     Priority: Medium     Hypertension, benign essential, goal below 140/90 11/07/2005     Priority: Medium     Patient has only fair bp control and with family history of diabetes will all ace if lab indicated also has bph and may op for psa and not digital exam next yr        Pain in joint, shoulder region 11/07/2005     Priority: Medium     Impotence  of organic origin 11/07/2005     Priority: Medium     Alcohol dependence, uncomplicated (H) 02/04/2020     Priority: Low     Hyperlipidemia LDL goal <100 10/31/2010     Priority: Low     Hypertrophy of prostate without urinary obstruction 11/07/2005     Priority: Low     Problem list name updated by automated process. Provider to review          Past Surgical History     Past Surgical History:   Procedure Laterality Date     BREAST SURGERY  2008    right breast mass benign     COLONOSCOPY      multiple polyps removed     COLONOSCOPY  8/24/2011    Procedure:COMBINED COLONOSCOPY, REMOVE TUMOR/POLYP/LESION BY SNARE; Surgeon:MILEY ARBOLEDA; Location:[facility]     COLONOSCOPY  12/17/2012    Procedure: COLONOSCOPY;;  Surgeon: Leon Maurer MD;  [facility]     COLONOSCOPY  12/18/2012    Procedure: COLONOSCOPY;;  Surgeon: Leon Maurer MD;  [facility]     DENERVATION OF SPERMATIC CORD MICROSURGICAL Left 5/23/2017    Procedure: DENERVATION OF SPERMATIC CORD MICROSURGICAL;;  Surgeon: Marcio Aggarwal MD;  [facility]     DISSECTION RADICAL NECK BILATERAL  8/2/2012    Procedure: DISSECTION RADICAL NECK BILATERAL;;  Surgeon: Yung Alvares MD;  [facility]     ENDOSCOPIC RETROGRADE CHOLANGIOPANCREATOGRAM N/A 5/10/2016    Procedure: COMBINED ENDOSCOPIC RETROGRADE CHOLANGIOPANCREATOGRAPHY, PLACE TUBE/STENT;  Surgeon: Yovanny Beasley MD;  [facility]     ENDOSCOPIC RETROGRADE CHOLANGIOPANCREATOGRAM N/A 3/29/2018    Procedure: ENDOSCOPIC RETROGRADE CHOLANGIOPANCREATOGRAM;  Endoscopic Retrograde Cholangiopancreatogram, Endoscopic Ultrasound, Biliary Sphincterotomy, Biliary and Pancreatic Stent Placement;  Surgeon: Yovanny Beasley MD;  [facility]     ENDOSCOPIC ULTRASOUND UPPER GASTROINTESTINAL TRACT (GI) N/A 2/3/2016    Procedure: ENDOSCOPIC ULTRASOUND, ESOPHAGOSCOPY / UPPER GASTROINTESTINAL TRACT (GI);  Surgeon: Grabiel Plata MD;  [facility]     ENDOSCOPIC  ULTRASOUND UPPER GASTROINTESTINAL TRACT (GI) N/A 3/29/2018    Procedure: ENDOSCOPIC ULTRASOUND, ESOPHAGOSCOPY / UPPER GASTROINTESTINAL TRACT (GI);;  Surgeon: Grabiel Plata MD;  [facility]     ESOPHAGOSCOPY, GASTROSCOPY, DUODENOSCOPY (EGD), COMBINED N/A 2/3/2016    Procedure: COMBINED ENDOSCOPIC ULTRASOUND, ESOPHAGOSCOPY, GASTROSCOPY, DUODENOSCOPY (EGD), FINE NEEDLE ASPIRATE/BIOPSY;  Surgeon: Grabiel Plata MD;  [facility]     ESOPHAGOSCOPY, GASTROSCOPY, DUODENOSCOPY (EGD), COMBINED N/A 6/8/2016    Procedure: COMBINED ESOPHAGOSCOPY, GASTROSCOPY, DUODENOSCOPY (EGD), REMOVE FOREIGN BODY;  Surgeon: Yovanny Beasley MD;  [facility]     ESOPHAGOSCOPY, GASTROSCOPY, DUODENOSCOPY (EGD), COMBINED N/A 4/17/2018    Procedure: COMBINED ESOPHAGOSCOPY, GASTROSCOPY, DUODENOSCOPY (EGD), REMOVE FOREIGN BODY;  EGD with stent removal;  Surgeon: Grabiel Plata MD;  [facility]     EXCISE LESION INTRAORAL  6/14/2012    Procedure: EXCISE LESION INTRAORAL;  Wide Local Excision Floor of Mouth, Direct Laryngoscopy, Bilateral Ida's Marsuplization, Split Thickness Skin Graft from right Thigh  Latex Safe;  Surgeon: Gerson Ravi MD;  [facility]     EXCISE LESION INTRAORAL  8/2/2012    Procedure: EXCISE LESION INTRAORAL;  Floor of Mouth Resection, Bilateral Selective Radical Neck Dissection, Tracheostomy, Left Radial Forearm  Free Flap with Alloderm, Nasogastric Feeding Tube Placement,    * Latex Safe*;  Surgeon: Gerson Ravi MD;  [facility]     EXCISE LESION INTRAORAL  12/11/2012    Procedure: EXCISE LESION INTRAORAL;  takedown of oral flap;  Surgeon: Yung Alvares MD;  [facility]     GRAFT FREE VASCULARIZED (LOCATION)  8/2/2012    Procedure: GRAFT FREE VASCULARIZED (LOCATION);;  Surgeon: Yung Alvares MD;  [facility]     GRAFT SKIN SPLIT THICKNESS FROM EXTREMITY  6/14/2012    Procedure: GRAFT SKIN SPLIT THICKNESS FROM EXTREMITY;;  Surgeon: Gerson Ravi MD;   [facility]     LAPAROSCOPIC ILEOSTOMY TAKEDOWN  2013    Procedure: LAPAROSCOPIC ILEOSTOMY TAKEDOWN;  Laparoscopic Closure of Enterostomy, Guerda's Type with IleoRectal Anastomosis ;  Surgeon: Grabiel Riddle MD;  [facility]     LAPAROTOMY EXPLORATORY  2012    Procedure: LAPAROTOMY EXPLORATORY;  Exploratory Laparotomy, total abdominal colectomy, ileostomy formation;  Surgeon: Miquel Cannon MD;  [facility]     LARYNGOSCOPY  2012    Procedure: LARYNGOSCOPY;;  Surgeon: Gerson Ravi MD;  [facility]     ORTHOPEDIC SURGERY      ganglian cyst left ankle     PANCREATECTOMY, SPLENECTOMY N/A 3/10/2016    Procedure: PANCREATECTOMY, SPLENECTOMY;  Surgeon: Nael Abel MD;  [facility]     SHOULDER SURGERY  ,     2006- right rotator cuff,  bone spur on left. Dr. Hdez     VARICOCELECTOMY Left 2017    Procedure: VARICOCELECTOMY;  Left Varicocele Repair, Denervation of Left Testis;  Surgeon: Marcio Aggarwal MD;  [facility]        Prior to Admission Medications   Prior to Admission Medications   Prescriptions Last Dose Informant Patient Reported? Taking?   Continuous Blood Gluc  (FREESTYLE LISA 14 DAY READER) JOSE ALBERTO 10/17/2020 at Unknown time Self No Yes   Si each as needed (use to scan blood sugars from sensor)   Continuous Blood Gluc Sensor (FREESTYLE LISA 14 DAY SENSOR) MISC 10/17/2020 at Unknown time Self No Yes   Si each every 14 days   insulin isophane human (NOVOLIN N FLEXPEN RELION) 100 UNIT/ML injection 10/17/2020 at am Self No Yes   Sig: Take 34 units of N in the am and 32 units in the pm.   insulin pen needle (BD LUIS U/F) 32G X 4 MM miscellaneous  Self No No   Sig: USE PEN NEEDLE ONCE DAILY AS DIRECTED   lisinopril (PRINIVIL/ZESTRIL) 5 MG tablet 10/17/2020 at am Self No Yes   Sig: Take 1 tablet (5 mg) by mouth daily   metoprolol succinate ER (TOPROL-XL) 50 MG 24 hr tablet 10/17/2020 at am Self No Yes   Sig: TAKE 1 & 1/2 (ONE  & ONE-HALF) TABLETS BY MOUTH TWICE DAILY   Patient taking differently: Take 75 mg by mouth 2 times daily    multivitamin, therapeutic with minerals (THERA-VIT-M) TABS 10/17/2020 at am Self No Yes   Sig: Take 1 tablet by mouth daily.   pantoprazole (PROTONIX) 40 MG EC tablet 10/17/2020 at am Self No Yes   Sig: Take 1 tablet (40 mg) by mouth daily   pregabalin (LYRICA) 75 MG capsule 10/17/2020 at am Self No Yes   Sig: Take 1 capsule (75 mg) by mouth 2 times daily   rosuvastatin (CRESTOR) 10 MG tablet 10/17/2020 at am Self No Yes   Sig: Take 1 tablet (10 mg) by mouth daily   tamsulosin (FLOMAX) 0.4 MG capsule 10/17/2020 at am Self No Yes   Sig: Take 1 capsule (0.4 mg) by mouth daily   vitamin B-12 (CYANOCOBALAMIN) 100 MCG tablet 10/17/2020 at am Self Yes Yes   Sig: Take 100 mcg by mouth daily   warfarin ANTICOAGULANT (COUMADIN) 2.5 MG tablet 10/16/2020 at pm Self No Yes   Sig: Take 2.5mg (1 tab) Tu/Thurs/Sat; 1.25mg (1/2 tab) all other days or as directed by the Anticoagulation Clinic      Facility-Administered Medications: None     Allergies   Allergies   Allergen Reactions     Nkda [No Known Drug Allergies]        Family History    Family History   Problem Relation Age of Onset     Diabetes Sister         onset age 50     Alzheimer Disease Mother          80     Alzheimer Disease Father          85     Diabetes Other         nephew type 1     Diabetes Other      Aneurysm Sister      Anesthesia Reaction No family hx of      Colon Cancer No family hx of      Colon Polyps No family hx of      Crohn's Disease No family hx of      Ulcerative Colitis No family hx of        Social History   Social History     Socioeconomic History     Marital status:      Spouse name: Not on file     Number of children: Not on file     Years of education: Not on file     Highest education level: Not on file   Occupational History     Occupation: [employer]     Comment: 20 hr/week monday-Thur 7-noon     Occupation:  "[place]      Employer: [employer]     Occupation: RETIRED   Social Needs     Financial resource strain: Not on file     Food insecurity     Worry: Not on file     Inability: Not on file     Transportation needs     Medical: Not on file     Non-medical: Not on file   Tobacco Use     Smoking status: Former Smoker     Packs/day: 1.00     Years: 40.00     Pack years: 40.00     Types: Cigarettes     Quit date: 2006     Years since quittin.9     Smokeless tobacco: Never Used   Substance and Sexual Activity     Alcohol use: Not Currently     Comment: \"1 beer on Thanksgiving day\"     Drug use: Yes     Comment: 3 beers daily     Sexual activity: Yes     Partners: Female   Lifestyle     Physical activity     Days per week: Not on file     Minutes per session: Not on file     Stress: Not on file   Relationships     Social connections     Talks on phone: Not on file     Gets together: Not on file     Attends Zoroastrian service: Not on file     Active member of club or organization: Not on file     Attends meetings of clubs or organizations: Not on file     Relationship status: Not on file     Intimate partner violence     Fear of current or ex partner: Not on file     Emotionally abused: Not on file     Physically abused: Not on file     Forced sexual activity: Not on file   Other Topics Concern      Service Yes     Comment: [employer] 6 years     Blood Transfusions No     Caffeine Concern Yes     Comment: 0-2 cups     Occupational Exposure No     Comment: retired     Hobby Hazards No     Sleep Concern No     Stress Concern No     Weight Concern No     Special Diet Yes     Comment: healthy     Back Care No     Exercise Yes     Bike Helmet Not Asked     Comment: N/A     Seat Belt Yes     Self-Exams Yes     Parent/sibling w/ CABG, MI or angioplasty before 65F 55M? No   Social History Narrative    Son lives in [place] with 2 grandchildren 19 and 6       Physical Exam   BP (!) " "197/101   Pulse 90   Temp 97.5  F (36.4  C) (Oral)   Resp 18   Ht 1.778 m (5' 10\")   Wt 89.1 kg (196 lb 6.9 oz)   SpO2 96%   BMI 28.18 kg/m       Weight: 196 lbs 6.88 oz Body mass index is 28.18 kg/m .     Constitutional: Alert, oriented, cooperative, no apparent distress, appears nontoxic  Eyes: Eyes are clear, pupils are reactive.  HENT:  No evidence of cranial trauma.  Lymph/Hematologic: No epitrochlear, axillary, anterior or posterior cervical, or supraclavicular lymphadenopathy is appreciated.  Cardiovascular: Regular rate and rhythm, normal S1 and S2, and no murmur noted. JVP is normal. Good peripheral pulses in wrists bilaterally. No lower extremity edema.  Respiratory: Clear to auscultation bilaterally.   GI: Soft, non-tender, hyperactive bowel sounds.   Genitourinary: Deferred  Musculoskeletal: Normal muscle bulk and tone.  Skin: Warm and dry, no rashes.   Neurologic: Neck supple. Cranial nerves are grossly intact.  is symmetric.     Data   Data reviewed today:   Recent Labs   Lab 10/17/20  1339 10/16/20  0833   WBC 14.0*  --    HGB 15.6  --    MCV 96  --      --    INR 2.20* 2.10*     --    POTASSIUM 4.4  --    CHLORIDE 101  --    CO2 32  --    BUN 14  --    CR 1.12  --    ANIONGAP 3  --    NILSON 10.0  --    *  --    ALBUMIN 3.4  --    PROTTOTAL 8.1  --    BILITOTAL 0.6  --    ALKPHOS 77  --    ALT 22  --    AST 14  --    LIPASE 1,011*  --    TROPI <0.015  --        Recent Results (from the past 24 hour(s))   CT Abdomen Pelvis w Contrast    Narrative    EXAM: CT ABDOMEN PELVIS W CONTRAST  LOCATION: [facility]  DATE/TIME: 10/17/2020 5:15 PM    INDICATION: Epigastric pain. Elevated lipase. History of pancreatitis.  COMPARISON: 10/03/2020  TECHNIQUE: CT scan of the abdomen and pelvis was performed following injection of IV contrast. Multiplanar reformats were obtained. Dose reduction techniques were used.  CONTRAST: 98mL [id number]-[id number]    FINDINGS:   LOWER CHEST: Minimal " linear scarring.    HEPATOBILIARY: Diffuse fatty infiltration of liver. Gallbladder and bile ducts unremarkable.    PANCREAS: Prior distal pancreatectomy. Mild amount of soft tissue stranding surrounds the pancreatic neck and residual body but the degree of peripancreatic inflammation has improved compared to the previous exam. There remains wall thickening of the   adjacent posterior aspect of stomach, again improved compared the previous study. There is no focal peripancreatic fluid collection. Pancreatic head appears normal.    SPLEEN: Prior splenectomy.    ADRENAL GLANDS: Normal.    KIDNEYS/BLADDER: Fetal lobulation with multiple small benign cysts unchanged. There is one small indeterminate 1 cm exophytic cyst lateral left lower pole (series 3 image 99) which is unchanged compared to recent study but is actually smaller compared to   exam of 11/24/2018 and should be benign.    BOWEL: Prior subtotal colectomy with stable postoperative findings, no bowel obstruction.    LYMPH NODES: Normal.    VASCULATURE: Heavy atherosclerotic calcifications.    PELVIC ORGANS: Normal.    MUSCULOSKELETAL: Prior decompression laminectomy.      Impression    IMPRESSION:   1.  Compared to exam of 2 weeks ago there is been improvement; decrease in peripancreatic inflammation and decreasing inflammatory wall thickening of the adjacent stomach.  2.  Stable postoperative changes after splenectomy and distal pancreatectomy. Stable postoperative appearances of bowel.  3.  No new abnormality.       I personally reviewed the abdominal CT image(s) showing .  Compared to exam of 2 weeks ago there is been improvement; decrease in peripancreatic inflammation and decreasing inflammatory wall thickening of the adjacent stomach

## 2020-10-18 NOTE — CONSULTS
Care Management Initial Consult    General Information  Assessment completed with:: Patient, Poli  Type of CM/SW Visit: Initial Assessment  Primary Care Provider verified and updated as needed?: Yes  Readmission Within the Last 30 Days: unable to assess  Return Category: Exacerbation of disease  Reason for Consult: discharge planning    Advance Care Planning: Advance Care Planning Reviewed: no concerns identified        Communication Assessment  Patient's communication style: spoken language (English or Bilingual)  Hearing Difficulty or Deaf: no Wear Glasses or Blind: yes    Cognitive  Cognitive/Neuro/Behavioral: WDL                      Living Environment:   People in home: spouse  Name(s) of People in Home: Lily  Current living Arrangements: house          Family/Social Support:  Care provided by: self, spouse/significant other  Provides care for: no one  Marital Status:   Who is your support system?: Wife  Spouse's Name: Lily  Description of Support System: Supportive, Involved  Description of Support System: Supportive, Involved  Adequate family and caregiver support        Description of Support System: Supportive, Involved  Support Assessment: Adequate family and caregiver support    Current Resources:   Skilled Home Care Services:  None  Community Resources: None  Equipment currently used at home: none  Supplies currently used at home: Diabetic Supplies    Employment:  Employment Status: employed part-time     Employment/ Comments: Retired [employer] & now works part time  Financial/Environmental Concerns: No concerns identified        Lifestyle & Psychosocial Needs:     Social Needs     Financial resource strain: Not hard at all     Food insecurity     Worry: Never true     Inability: Never true     Transportation needs     Medical: No     Non-medical: No     Socioeconomic History     Marital status:      Spouse name: Lily     Number of children: Not on file     Years of education:  Not on file     Highest education level: Not on file   Occupational History     Occupation: [employer]     Comment: 20 hr/week monday-Thur 7-noon     Occupation: [place]      Employer: [employer]     Occupation: RETIRED     Tobacco Use     Smoking status: Former Smoker     Packs/day: 1.00     Years: 40.00     Pack years: 40.00     Types: Cigarettes     Quit date: 2006     Years since quittin.9     Smokeless tobacco: Never Used   Substance and Sexual Activity     Alcohol use: Yes     Frequency: 4 or more times a week     Drinks per session: 3 or 4     Binge frequency: Weekly     Sexual activity: Yes     Partners: Female     Functional Status:  Prior to admission patient needed assistance:      NA    Mental Health Status:  WDL           Values/Beliefs:  Spiritual, Cultural Beliefs, Yazdanism Practices, Values that affect care:   NA              Additional Information:  Pt reports to this writer that he has no needs at the time of discharge.  Pt reports living in a home with wife, Lily & their 2 dogs.    Pt drives, works part time & remains active.    Katie Mendieta, JAMMIE

## 2020-10-18 NOTE — UTILIZATION REVIEW
Admission Status; Secondary Review Determination       Under the authority of the Utilization Management Committee, the utilization review process indicated a secondary review on the above patient. The review outcome is based on review of the medical records, discussions with staff, and applying clinical experience noted on the date of the review.     (x) Inpatient Status Appropriate - This patient's medical care is consistent with medical management for inpatient care and reasonable inpatient medical practice.     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 Very complex patient for outpatient management he is n.p.o. on IV fluid requiring around-the-clock IV Dilaudid. The expected length of stay at the time of admission was more than 2 nights because of the severity of illness, intensity of service provided, and risk for adverse outcome. Inpatient admission is appropriate.     This document was produced using voice recognition software       The information on this document is developed by the utilization review team in order for the business office to ensure compliance. This only denotes the appropriateness of proper admission status and does not reflect the quality of care rendered.   The definitions of Inpatient Status and Observation Status used in making the determination above are those provided in the CMS Coverage Manual, Chapter 1 and Chapter 6, section 70.4.   Sincerely,   WESLEY GODOY MD   System  Medical Director   Utilization Management   [facility].

## 2020-10-18 NOTE — PLAN OF CARE
Patient reports abdominal pain 6/10. He was given dilaudid x2 this shift about 4-5 hours apart. He reports good relief of pain with dilaudid. He had one episode of nausea, was given zofran with good relief.

## 2020-10-18 NOTE — PROGRESS NOTES
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  Patient states his pain this time is similar to previous episodes of abdominal pain.    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  He is followed at [facility]. Patient is scheduled to have MRCP with secretin study on 11/13/2020.    Patient continues to drink alcohol regularly, last drink 2 days ago.  No history of alcohol withdrawal.  In the ED lipase was found to be elevated at 1100, white blood count elevated at 14.    CT abdomen pelvis showed:  1.  Compared to exam of 2 weeks ago there is been improvement; decrease in peripancreatic inflammation and decreasing inflammatory wall thickening of the adjacent stomach.  2.  Stable postoperative changes after splenectomy and distal pancreatectomy. Stable postoperative appearances of bowel.  3.  No new abnormality.  Patient was given 2 L IV fluids and his pain was controlled with Dilaudid 0.5 mg.     It's possible patient has recurrent pancreatitis though CT actually improving. Gastritis was considered last admission given CT findings, and outpatient EGD to be considered if recurrent symptoms. Abdominal pain a little better today but not resolved.    - continue IVF and pain control   - can have some clears  tonight and then NPO in morning for EGD, discussed with Dr. Oliva   - PPI    - If EGD negative, will continue conservative cares. If stays off alcohol, may be fine until scheduled testing in a few weeks at [facility].     Hypertension, benign essential, goal below 140/90  Continue home medication lisinopril and Toprol-XL      Peripheral vascular disease (H)    CAD (coronary artery disease)  Cath showed moderate coronary disease in 2009, no stents placed  Continue home Crestor 10 mg daily      Type 2 diabetes mellitus with diabetic polyneuropathy, without long-term current use of insulin (H)  Home medication Novolin 34 units in the morning and 32 units at night  Patient is n.p.o.  Hold basal insulin for now and start patient on ISS    .       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  Resume home Crestor 10 mg.      Alcohol dependence, uncomplicated (H)  No history of alcohol withdrawal  Last drink 2 days ago.       Diet: NPO for Medical/Clinical Reasons Except for: Meds, Ice Chips  DVT Prophylaxis: Warfarin  Chavez Catheter: not present  Code Status:   Full code  Lines: peripheral IV Line     Discussion: Modest improvement. EGD tomorrow. Discussed with Dr. Oliva    Disposition: Anticipate discharge 2-3 days     Attestation:  Total time: 25 minutes    Neo Shetty MD  [facility]        Interval History   Pain left upper abdomen, Knawing pain. A little better presently. Had emesis x 1 PTA with no blood. No chest pain, shortness of breath. Cut drinking down to 1-2 beers per day since last admission.     Physical Exam   Temp:  [97.5  F (36.4  C)-97.9  F (36.6  C)] 97.9  F (36.6  C)  Pulse:  []  78  Resp:  [16-20] 16  BP: (125-197)/() 132/69  SpO2:  [92 %-96 %] 93 %    Weights:   Vitals:    10/17/20 1258 10/17/20 2338   Weight: 90.7 kg (200 lb) 89.1 kg (196 lb 6.9 oz)    Body mass index is 28.18 kg/m .    Constitutional: alert and oriented, tired appearing but nontoxic  CV: Regular, no JVD, no edema  Respiratory: CTA bilaterally  GI: Soft, moderate epigastric tenderness to moderate palpation, bowel sounds present  Skin: Warm and dry    Data   Recent Labs   Lab 10/17/20  1339 10/16/20  0833   WBC 14.0*  --    HGB 15.6  --    MCV 96  --      --    INR 2.20* 2.10*     --    POTASSIUM 4.4  --    CHLORIDE 101  --    CO2 32  --    BUN 14  --    CR 1.12  --    ANIONGAP 3  --    NILSON 10.0  --    *  --    ALBUMIN 3.4  --    PROTTOTAL 8.1  --    BILITOTAL 0.6  --    ALKPHOS 77  --    ALT 22  --    AST 14  --    LIPASE 1,011*  --    TROPI <0.015  --        Recent Labs   Lab 10/18/20  1113 10/18/20  0742 10/18/20  0627 10/18/20  0210 10/17/20  2134 10/17/20  1339   GLC  --   --   --   --   --  185*   BGM 94 120* 118* 160* 129*  --         Unresulted Labs Ordered in the Past 30 Days of this Admission     Date and Time Order Name Status Description    10/17/2020 2201 Asymptomatic COVID-19 Virus (Coronavirus) by PCR In process            Imaging:   Recent Results (from the past 24 hour(s))   CT Abdomen Pelvis w Contrast    Narrative    EXAM: CT ABDOMEN PELVIS W CONTRAST  LOCATION: [facility]  DATE/TIME: 10/17/2020 5:15 PM    INDICATION: Epigastric pain. Elevated lipase. History of pancreatitis.  COMPARISON: 10/03/2020  TECHNIQUE: CT scan of the abdomen and pelvis was performed following injection of IV contrast. Multiplanar reformats were obtained. Dose reduction techniques were used.  CONTRAST: 98mL Isovue-370    FINDINGS:   LOWER CHEST: Minimal linear scarring.    HEPATOBILIARY: Diffuse fatty infiltration of liver. Gallbladder and bile ducts unremarkable.    PANCREAS: Prior distal  pancreatectomy. Mild amount of soft tissue stranding surrounds the pancreatic neck and residual body but the degree of peripancreatic inflammation has improved compared to the previous exam. There remains wall thickening of the   adjacent posterior aspect of stomach, again improved compared the previous study. There is no focal peripancreatic fluid collection. Pancreatic head appears normal.    SPLEEN: Prior splenectomy.    ADRENAL GLANDS: Normal.    KIDNEYS/BLADDER: Fetal lobulation with multiple small benign cysts unchanged. There is one small indeterminate 1 cm exophytic cyst lateral left lower pole (series 3 image 99) which is unchanged compared to recent study but is actually smaller compared to   exam of 11/24/2018 and should be benign.    BOWEL: Prior subtotal colectomy with stable postoperative findings, no bowel obstruction.    LYMPH NODES: Normal.    VASCULATURE: Heavy atherosclerotic calcifications.    PELVIC ORGANS: Normal.    MUSCULOSKELETAL: Prior decompression laminectomy.      Impression    IMPRESSION:   1.  Compared to exam of 2 weeks ago there is been improvement; decrease in peripancreatic inflammation and decreasing inflammatory wall thickening of the adjacent stomach.  2.  Stable postoperative changes after splenectomy and distal pancreatectomy. Stable postoperative appearances of bowel.  3.  No new abnormality.        I reviewed all new labs and imaging results over the last 24 hours. I personally reviewed the abdominal CT image(s) showing thickened gastric wall.    Medications     sodium chloride 100 mL/hr at 10/18/20 1037       insulin aspart  1-4 Units Subcutaneous Q4H     lisinopril  5 mg Oral Daily     metoprolol succinate ER  75 mg Oral BID     pantoprazole  40 mg Oral Daily     rosuvastatin  10 mg Oral Daily     sodium chloride (PF)  3 mL Intracatheter Q8H     tamsulosin  0.4 mg Oral Daily   Reviewed yulisa Shetty MD  [facility]

## 2020-10-18 NOTE — PLAN OF CARE
Patient A & O. Pain trolled with IV Dilaudid 0.5 with good results. Patient resting well in bed. Patient NPO with NS running at 100 ml per hour. Blood sugar checks Q4.

## 2020-10-18 NOTE — PLAN OF CARE
"[facility] [facility] [place]MISSION NOTE    Patient admitted to room 2303 at approximately 2340 via wheel chair from emergency room. Patient was accompanied by transport tech.     Verbal SBAR report received from Josie SORENSEN prior to patient arrival.     Patient ambulated to bed with stand-by assist. Patient alert and oriented X 3. Pain is controlled with current analgesics.  Medication(s) being used: narcotic analgesics including hydromorphone (Dilaudid). 0-10 Pain Scale: 3. Admission vital signs: Blood pressure (!) 197/101, pulse 90, temperature 97.5  F (36.4  C), temperature source Oral, resp. rate 18, height 1.778 m (5' 10\"), weight 89.1 kg (196 lb 6.9 oz), SpO2 96 %. Patient was oriented to plan of care, visiting hours.     Risk Assessment    The following safety risks were identified during admission: none. Yellow risk band applied: NO.     Skin Initial Assessment    This writer admitted this patient and completed a full skin assessment and Romain score in the Adult PCS flowsheet. Appropriate interventions initiated as needed.     Secondary skin check completed by Janeen SORENSEN.         Education    Patient has a [place] to Observation order: No  Observation education completed and documented: N/A      Pamela Mae RN    "

## 2020-10-19 ENCOUNTER — ANESTHESIA (OUTPATIENT)
Dept: GASTROENTEROLOGY | Facility: CLINIC | Age: 75
End: 2020-10-19
Payer: COMMERCIAL

## 2020-10-19 ENCOUNTER — ANESTHESIA EVENT (OUTPATIENT)
Dept: GASTROENTEROLOGY | Facility: CLINIC | Age: 75
End: 2020-10-19
Payer: COMMERCIAL

## 2020-10-19 PROBLEM — K29.00 ACUTE GASTRITIS WITHOUT HEMORRHAGE, UNSPECIFIED GASTRITIS TYPE: Status: ACTIVE | Noted: 2020-10-17

## 2020-10-19 LAB
ALBUMIN SERPL-MCNC: 2.3 G/DL (ref 3.4–5)
ALP SERPL-CCNC: 57 U/L (ref 40–150)
ALT SERPL W P-5'-P-CCNC: 14 U/L (ref 0–70)
ANION GAP SERPL CALCULATED.3IONS-SCNC: 3 MMOL/L (ref 3–14)
AST SERPL W P-5'-P-CCNC: 10 U/L (ref 0–45)
BILIRUB SERPL-MCNC: 0.8 MG/DL (ref 0.2–1.3)
BUN SERPL-MCNC: 10 MG/DL (ref 7–30)
CALCIUM SERPL-MCNC: 8.4 MG/DL (ref 8.5–10.1)
CHLORIDE SERPL-SCNC: 108 MMOL/L (ref 94–109)
CO2 SERPL-SCNC: 28 MMOL/L (ref 20–32)
CREAT SERPL-MCNC: 0.99 MG/DL (ref 0.66–1.25)
ERYTHROCYTE [DISTWIDTH] IN BLOOD BY AUTOMATED COUNT: 16.6 % (ref 10–15)
GFR SERPL CREATININE-BSD FRML MDRD: 76 ML/MIN/{1.73_M2}
GLUCOSE BLDC GLUCOMTR-MCNC: 143 MG/DL (ref 70–99)
GLUCOSE BLDC GLUCOMTR-MCNC: 149 MG/DL (ref 70–99)
GLUCOSE BLDC GLUCOMTR-MCNC: 150 MG/DL (ref 70–99)
GLUCOSE BLDC GLUCOMTR-MCNC: 152 MG/DL (ref 70–99)
GLUCOSE BLDC GLUCOMTR-MCNC: 167 MG/DL (ref 70–99)
GLUCOSE BLDC GLUCOMTR-MCNC: 181 MG/DL (ref 70–99)
GLUCOSE SERPL-MCNC: 192 MG/DL (ref 70–99)
HCT VFR BLD AUTO: 40.2 % (ref 40–53)
HGB BLD-MCNC: 12.5 G/DL (ref 13.3–17.7)
INR PPP: 2.18 (ref 0.86–1.14)
LIPASE SERPL-CCNC: 665 U/L (ref 73–393)
MCH RBC QN AUTO: 30.9 PG (ref 26.5–33)
MCHC RBC AUTO-ENTMCNC: 31.1 G/DL (ref 31.5–36.5)
MCV RBC AUTO: 99 FL (ref 78–100)
PLATELET # BLD AUTO: 247 10E9/L (ref 150–450)
POTASSIUM SERPL-SCNC: 4.2 MMOL/L (ref 3.4–5.3)
PROT SERPL-MCNC: 5.7 G/DL (ref 6.8–8.8)
RBC # BLD AUTO: 4.05 10E12/L (ref 4.4–5.9)
SODIUM SERPL-SCNC: 139 MMOL/L (ref 133–144)
UPPER GI ENDOSCOPY: NORMAL
WBC # BLD AUTO: 8.1 10E9/L (ref 4–11)

## 2020-10-19 PROCEDURE — 258N000003 HC RX IP 258 OP 636: Performed by: SURGERY

## 2020-10-19 PROCEDURE — 250N000009 HC RX 250: Performed by: NURSE ANESTHETIST, CERTIFIED REGISTERED

## 2020-10-19 PROCEDURE — 43250 EGD CAUTERY TUMOR POLYP: CPT | Performed by: SURGERY

## 2020-10-19 PROCEDURE — 250N000013 HC RX MED GY IP 250 OP 250 PS 637: Performed by: INTERNAL MEDICINE

## 2020-10-19 PROCEDURE — 85027 COMPLETE CBC AUTOMATED: CPT | Performed by: INTERNAL MEDICINE

## 2020-10-19 PROCEDURE — 99232 SBSQ HOSP IP/OBS MODERATE 35: CPT | Performed by: INTERNAL MEDICINE

## 2020-10-19 PROCEDURE — 43239 EGD BIOPSY SINGLE/MULTIPLE: CPT | Performed by: SURGERY

## 2020-10-19 PROCEDURE — 88305 TISSUE EXAM BY PATHOLOGIST: CPT | Mod: 26 | Performed by: PATHOLOGY

## 2020-10-19 PROCEDURE — 258N000003 HC RX IP 258 OP 636: Performed by: FAMILY MEDICINE

## 2020-10-19 PROCEDURE — 0DB68ZX EXCISION OF STOMACH, VIA NATURAL OR ARTIFICIAL OPENING ENDOSCOPIC, DIAGNOSTIC: ICD-10-PCS | Performed by: SURGERY

## 2020-10-19 PROCEDURE — 85610 PROTHROMBIN TIME: CPT | Performed by: INTERNAL MEDICINE

## 2020-10-19 PROCEDURE — 250N000013 HC RX MED GY IP 250 OP 250 PS 637: Performed by: FAMILY MEDICINE

## 2020-10-19 PROCEDURE — 999N001017 HC STATISTIC GLUCOSE BY METER IP

## 2020-10-19 PROCEDURE — 83690 ASSAY OF LIPASE: CPT | Performed by: INTERNAL MEDICINE

## 2020-10-19 PROCEDURE — 80053 COMPREHEN METABOLIC PANEL: CPT | Performed by: INTERNAL MEDICINE

## 2020-10-19 PROCEDURE — 88305 TISSUE EXAM BY PATHOLOGIST: CPT | Mod: TC | Performed by: SURGERY

## 2020-10-19 PROCEDURE — 36415 COLL VENOUS BLD VENIPUNCTURE: CPT | Performed by: INTERNAL MEDICINE

## 2020-10-19 PROCEDURE — 250N000011 HC RX IP 250 OP 636: Performed by: NURSE ANESTHETIST, CERTIFIED REGISTERED

## 2020-10-19 PROCEDURE — 120N000001 HC R&B MED SURG/OB

## 2020-10-19 PROCEDURE — 370N000001 HC ANESTHESIA TECHNICAL FEE, 1ST 30 MIN: Performed by: SURGERY

## 2020-10-19 PROCEDURE — 250N000011 HC RX IP 250 OP 636: Performed by: EMERGENCY MEDICINE

## 2020-10-19 RX ORDER — ONDANSETRON 2 MG/ML
4 INJECTION INTRAMUSCULAR; INTRAVENOUS
Status: DISCONTINUED | OUTPATIENT
Start: 2020-10-19 | End: 2020-10-19 | Stop reason: HOSPADM

## 2020-10-19 RX ORDER — SODIUM CHLORIDE, SODIUM LACTATE, POTASSIUM CHLORIDE, CALCIUM CHLORIDE 600; 310; 30; 20 MG/100ML; MG/100ML; MG/100ML; MG/100ML
INJECTION, SOLUTION INTRAVENOUS CONTINUOUS
Status: DISCONTINUED | OUTPATIENT
Start: 2020-10-19 | End: 2020-10-19 | Stop reason: HOSPADM

## 2020-10-19 RX ORDER — LIDOCAINE 40 MG/G
CREAM TOPICAL
Status: DISCONTINUED | OUTPATIENT
Start: 2020-10-19 | End: 2020-10-19 | Stop reason: HOSPADM

## 2020-10-19 RX ORDER — PROPOFOL 10 MG/ML
INJECTION, EMULSION INTRAVENOUS PRN
Status: DISCONTINUED | OUTPATIENT
Start: 2020-10-19 | End: 2020-10-19

## 2020-10-19 RX ORDER — PROPOFOL 10 MG/ML
INJECTION, EMULSION INTRAVENOUS CONTINUOUS PRN
Status: DISCONTINUED | OUTPATIENT
Start: 2020-10-19 | End: 2020-10-19

## 2020-10-19 RX ORDER — LIDOCAINE HYDROCHLORIDE 10 MG/ML
INJECTION, SOLUTION INFILTRATION; PERINEURAL PRN
Status: DISCONTINUED | OUTPATIENT
Start: 2020-10-19 | End: 2020-10-19

## 2020-10-19 RX ORDER — GLYCOPYRROLATE 0.2 MG/ML
INJECTION, SOLUTION INTRAMUSCULAR; INTRAVENOUS PRN
Status: DISCONTINUED | OUTPATIENT
Start: 2020-10-19 | End: 2020-10-19

## 2020-10-19 RX ADMIN — HYDROMORPHONE HYDROCHLORIDE 0.5 MG: 1 INJECTION, SOLUTION INTRAMUSCULAR; INTRAVENOUS; SUBCUTANEOUS at 04:50

## 2020-10-19 RX ADMIN — DEXTROSE AND SODIUM CHLORIDE: 5; 900 INJECTION, SOLUTION INTRAVENOUS at 15:49

## 2020-10-19 RX ADMIN — LIDOCAINE HYDROCHLORIDE 100 MG: 10 INJECTION, SOLUTION INFILTRATION; PERINEURAL at 09:51

## 2020-10-19 RX ADMIN — PROPOFOL 200 MCG/KG/MIN: 10 INJECTION, EMULSION INTRAVENOUS at 09:52

## 2020-10-19 RX ADMIN — METOPROLOL SUCCINATE 75 MG: 25 TABLET, EXTENDED RELEASE ORAL at 20:07

## 2020-10-19 RX ADMIN — TOPICAL ANESTHETIC 1 SPRAY: 200 SPRAY DENTAL; PERIODONTAL at 09:53

## 2020-10-19 RX ADMIN — Medication 1.25 MG: at 17:37

## 2020-10-19 RX ADMIN — METOPROLOL SUCCINATE 75 MG: 25 TABLET, EXTENDED RELEASE ORAL at 08:22

## 2020-10-19 RX ADMIN — DEXTROSE AND SODIUM CHLORIDE: 5; 900 INJECTION, SOLUTION INTRAVENOUS at 01:16

## 2020-10-19 RX ADMIN — INSULIN ASPART 1 UNITS: 100 INJECTION, SOLUTION INTRAVENOUS; SUBCUTANEOUS at 01:35

## 2020-10-19 RX ADMIN — GLYCOPYRROLATE 0.2 MG: 0.2 INJECTION, SOLUTION INTRAMUSCULAR; INTRAVENOUS at 09:51

## 2020-10-19 RX ADMIN — PROPOFOL 50 MG: 10 INJECTION, EMULSION INTRAVENOUS at 09:52

## 2020-10-19 RX ADMIN — ROSUVASTATIN CALCIUM 10 MG: 5 TABLET, FILM COATED ORAL at 08:09

## 2020-10-19 RX ADMIN — DEXTROSE AND SODIUM CHLORIDE: 5; 900 INJECTION, SOLUTION INTRAVENOUS at 22:26

## 2020-10-19 RX ADMIN — TAMSULOSIN HYDROCHLORIDE 0.4 MG: 0.4 CAPSULE ORAL at 08:08

## 2020-10-19 RX ADMIN — TOPICAL ANESTHETIC 1 SPRAY: 200 SPRAY DENTAL; PERIODONTAL at 09:51

## 2020-10-19 RX ADMIN — DEXTROSE AND SODIUM CHLORIDE: 5; 900 INJECTION, SOLUTION INTRAVENOUS at 07:48

## 2020-10-19 RX ADMIN — SODIUM CHLORIDE, POTASSIUM CHLORIDE, SODIUM LACTATE AND CALCIUM CHLORIDE: 600; 310; 30; 20 INJECTION, SOLUTION INTRAVENOUS at 09:36

## 2020-10-19 RX ADMIN — INSULIN ASPART 1 UNITS: 100 INJECTION, SOLUTION INTRAVENOUS; SUBCUTANEOUS at 16:42

## 2020-10-19 RX ADMIN — INSULIN ASPART 1 UNITS: 100 INJECTION, SOLUTION INTRAVENOUS; SUBCUTANEOUS at 11:44

## 2020-10-19 RX ADMIN — INSULIN ASPART 1 UNITS: 100 INJECTION, SOLUTION INTRAVENOUS; SUBCUTANEOUS at 04:49

## 2020-10-19 RX ADMIN — INSULIN ASPART 1 UNITS: 100 INJECTION, SOLUTION INTRAVENOUS; SUBCUTANEOUS at 08:20

## 2020-10-19 RX ADMIN — PANTOPRAZOLE SODIUM 40 MG: 20 TABLET, DELAYED RELEASE ORAL at 08:08

## 2020-10-19 ASSESSMENT — ACTIVITIES OF DAILY LIVING (ADL)
ADLS_ACUITY_SCORE: 11

## 2020-10-19 ASSESSMENT — ENCOUNTER SYMPTOMS: DYSRHYTHMIAS: 1

## 2020-10-19 ASSESSMENT — LIFESTYLE VARIABLES: TOBACCO_USE: 1

## 2020-10-19 NOTE — ANESTHESIA CARE TRANSFER NOTE
Patient: Antoine Russo    Procedure(s):  ESOPHAGOGASTRODUODENOSCOPY, WITH LESION EXCISION OR FULGURATION USING HOT BIOPSY FORCEPS    Diagnosis: Acute gastritis without hemorrhage, unspecified gastritis type [K29.00]  Diagnosis Additional Information: No value filed.    Anesthesia Type:   MAC     Note:  Airway :Nasal Cannula  Patient transferred to:Phase II  Handoff Report: Identifed the Patient, Identified the Reponsible Provider, Reviewed the pertinent medical history, Discussed the surgical course, Reviewed Intra-OP anesthesia mangement and issues during anesthesia, Set expectations for post-procedure period and Allowed opportunity for questions and acknowledgement of understanding      Vitals: (Last set prior to Anesthesia Care Transfer)    CRNA VITALS  10/19/2020 0935 - 10/19/2020 1005      10/19/2020             Ht Rate:  82                Electronically Signed By: Sky Voss CRNA, APRN CRNA  October 19, 2020  10:05 AM

## 2020-10-19 NOTE — ANESTHESIA POSTPROCEDURE EVALUATION
Patient: Antoine Russo    Procedure(s):  ESOPHAGOGASTRODUODENOSCOPY, WITH LESION EXCISION OR FULGURATION USING HOT BIOPSY FORCEPS    Diagnosis:Acute gastritis without hemorrhage, unspecified gastritis type [K29.00]  Diagnosis Additional Information: No value filed.    Anesthesia Type:  MAC    Note:  Anesthesia Post Evaluation    Patient location during evaluation: Bedside  Patient participation: Able to fully participate in evaluation  Level of consciousness: awake and alert  Pain management: adequate  Airway patency: patent  Cardiovascular status: acceptable  Respiratory status: acceptable  Hydration status: acceptable  PONV: none     Anesthetic complications: None          Last vitals:  Vitals:    10/19/20 0505 10/19/20 0810 10/19/20 0937   BP:  101/56 114/57   Pulse:  68 76   Resp: 16 16 16   Temp:  36.8  C (98.2  F)    SpO2: 92% 93% 93%         Electronically Signed By: Sky Voss CRNA, APRN CRNA  October 19, 2020  10:06 AM

## 2020-10-19 NOTE — ANESTHESIA PREPROCEDURE EVALUATION
Anesthesia Pre-Procedure Evaluation    Patient: Antoine Russo   MRN: [id number] : 1945          Preoperative Diagnosis: Acute gastritis without hemorrhage, unspecified gastritis type [K29.00]    Procedure(s):  ESOPHAGOGASTRODUODENOSCOPY (EGD)    Past Medical History:   Diagnosis Date     Acute kidney injury (H) 2019     Acute on chronic pancreatitis (H) 2018     Bacteriuria with pyuria 2019     C. difficile colitis      Colon polyp      Colon polyp 2011    Colonoscopy 2011-A sessile polyp was found in the cecum. The polyp was 6 mm in size. The polyp was removed with a hot snare. Resection and retrieval were complete. A sessile polyp was found in the proximal transverse colon. The polyp was 15 mm in size. The polyp was removed with a hot snare. Resection and retrieval were complete. A sessile polyp was found in the sigmoid colon. The polyp was 5 mm     Coronary artery disease      Diabetes mellitus (H)     type 2     Diverticulitis      Diverticulitis of colon 2007    Colitis on CT Scan 2011- [place] [facility] 2011 Diverticulitis - Multiple small and large-mouthed diverticula were found in the mid sigmoid colon and at the hepatic flexure. There was narrowing of the colon in association with the diverticular opening. Nena-diverticular erythema was seen. There was evidence of an impacted diverticulum. 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     Noninfectious ileitis      Recurrent pancreatitis      Past Surgical History:   Procedure Laterality Date     BREAST SURGERY      right breast mass benign     COLONOSCOPY      multiple polyps removed     COLONOSCOPY  2011    Procedure:COMBINED COLONOSCOPY, REMOVE TUMOR/POLYP/LESION BY SNARE; Surgeon:MILEY ARBOLEDA; Location:[facility]     COLONOSCOPY  2012    Procedure: COLONOSCOPY;;  Surgeon:  Leon Maurer MD;  Location: [facility]     COLONOSCOPY  12/18/2012    Procedure: COLONOSCOPY;;  Surgeon: Leon Maurer MD;  [facility]     DENERVATION OF SPERMATIC CORD MICROSURGICAL Left 5/23/2017    Procedure: DENERVATION OF SPERMATIC CORD MICROSURGICAL;;  Surgeon: Marcio Aggarwal MD;  [facility]     DISSECTION RADICAL NECK BILATERAL  8/2/2012    Procedure: DISSECTION RADICAL NECK BILATERAL;;  Surgeon: Yung Alvares MD;  [facility]     ENDOSCOPIC RETROGRADE CHOLANGIOPANCREATOGRAM N/A 5/10/2016    Procedure: COMBINED ENDOSCOPIC RETROGRADE CHOLANGIOPANCREATOGRAPHY, PLACE TUBE/STENT;  Surgeon: Yovanny Beasley MD;  [facility]     ENDOSCOPIC RETROGRADE CHOLANGIOPANCREATOGRAM N/A 3/29/2018    Procedure: ENDOSCOPIC RETROGRADE CHOLANGIOPANCREATOGRAM;  Endoscopic Retrograde Cholangiopancreatogram, Endoscopic Ultrasound, Biliary Sphincterotomy, Biliary and Pancreatic Stent Placement;  Surgeon: Yovanny Beasley MD;  [facility]     ENDOSCOPIC ULTRASOUND UPPER GASTROINTESTINAL TRACT (GI) N/A 2/3/2016    Procedure: ENDOSCOPIC ULTRASOUND, ESOPHAGOSCOPY / UPPER GASTROINTESTINAL TRACT (GI);  Surgeon: Grabiel Plata MD;  [facility]     ENDOSCOPIC ULTRASOUND UPPER GASTROINTESTINAL TRACT (GI) N/A 3/29/2018    Procedure: ENDOSCOPIC ULTRASOUND, ESOPHAGOSCOPY / UPPER GASTROINTESTINAL TRACT (GI);;  Surgeon: Grabiel Plata MD;  [facility]     ESOPHAGOSCOPY, GASTROSCOPY, DUODENOSCOPY (EGD), COMBINED N/A 2/3/2016    Procedure: COMBINED ENDOSCOPIC ULTRASOUND, ESOPHAGOSCOPY, GASTROSCOPY, DUODENOSCOPY (EGD), FINE NEEDLE ASPIRATE/BIOPSY;  Surgeon: Grabiel Plata MD;  [facility]     ESOPHAGOSCOPY, GASTROSCOPY, DUODENOSCOPY (EGD), COMBINED N/A 6/8/2016    Procedure: COMBINED ESOPHAGOSCOPY, GASTROSCOPY, DUODENOSCOPY (EGD), REMOVE FOREIGN BODY;  Surgeon: Yovanny Beasley MD;  [facility]     ESOPHAGOSCOPY, GASTROSCOPY, DUODENOSCOPY (EGD), COMBINED  N/A 4/17/2018    Procedure: COMBINED ESOPHAGOSCOPY, GASTROSCOPY, DUODENOSCOPY (EGD), REMOVE FOREIGN BODY;  EGD with stent removal;  Surgeon: Grabiel Plata MD;  [facility]     EXCISE LESION INTRAORAL  6/14/2012    Procedure: EXCISE LESION INTRAORAL;  Wide Local Excision Floor of Mouth, Direct Laryngoscopy, Bilateral Columbus's Marsuplization, Split Thickness Skin Graft from right Thigh  Latex Safe;  Surgeon: Gerson Ravi MD;  [facility]     EXCISE LESION INTRAORAL  8/2/2012    Procedure: EXCISE LESION INTRAORAL;  Floor of Mouth Resection, Bilateral Selective Radical Neck Dissection, Tracheostomy, Left Radial Forearm  Free Flap with Alloderm, Nasogastric Feeding Tube Placement,    * Latex Safe*;  Surgeon: Gerson Ravi MD;  [facility]     EXCISE LESION INTRAORAL  12/11/2012    Procedure: EXCISE LESION INTRAORAL;  takedown of oral flap;  Surgeon: Yung Alvares MD;  [facility]     GRAFT FREE VASCULARIZED (LOCATION)  8/2/2012    Procedure: GRAFT FREE VASCULARIZED (LOCATION);;  Surgeon: Yung Alvares MD;  [facility]     GRAFT SKIN SPLIT THICKNESS FROM EXTREMITY  6/14/2012    Procedure: GRAFT SKIN SPLIT THICKNESS FROM EXTREMITY;;  Surgeon: Gerson Ravi MD;  [facility]     LAPAROSCOPIC ILEOSTOMY TAKEDOWN  6/6/2013    Procedure: LAPAROSCOPIC ILEOSTOMY TAKEDOWN;  Laparoscopic Closure of Enterostomy, Guerda's Type with IleoRectal Anastomosis ;  Surgeon: Grabiel Riddle MD;  [facility]     LAPAROTOMY EXPLORATORY  12/20/2012    Procedure: LAPAROTOMY EXPLORATORY;  Exploratory Laparotomy, total abdominal colectomy, ileostomy formation;  Surgeon: Miquel Cannon MD;  [facility]     LARYNGOSCOPY  6/14/2012    Procedure: LARYNGOSCOPY;;  Surgeon: Gerson Ravi MD;  [facility]     ORTHOPEDIC SURGERY      ganglian cyst left ankle     PANCREATECTOMY, SPLENECTOMY N/A 3/10/2016    Procedure: PANCREATECTOMY, SPLENECTOMY;  Surgeon: Nael Abel MD;  [facility]      SHOULDER SURGERY  2006, 2008    2006- right rotator cuff, 2008 bone spur on left. Dr. Hdez     VARICOCELECTOMY Left 5/23/2017    Procedure: VARICOCELECTOMY;  Left Varicocele Repair, Denervation of Left Testis;  Surgeon: Marcio Aggarwal MD;  [facility]       Anesthesia Evaluation     . Pt has had prior anesthetic. Type: General and MAC           ROS/MED HX    ENT/Pulmonary:     (+)tobacco use, , . Other pulmonary disease cancer of anterior portion of floor.    Neurologic:       Cardiovascular:     (+) Dyslipidemia, hypertension-Peripheral Vascular Disease-CAD, --. : . . . :. dysrhythmias a-fib, .       METS/Exercise Tolerance:     Hematologic:         Musculoskeletal:         GI/Hepatic:     (+) GERD Inflammatory bowel disease, Other GI/Hepatic       Renal/Genitourinary:         Endo:     (+) type I DM, Diabetic complications: nephropathy, Other Endocrine Disorder recurrent pancreatitis.      Psychiatric:         Infectious Disease:         Malignancy:   (+) Malignancy History of Other          Other:    - neg other ROS                      Physical Exam  Normal systems: cardiovascular, pulmonary and dental    Airway   Mallampati: II  TM distance: >3 FB  Neck ROM: full    Dental     Cardiovascular       Pulmonary             Lab Results   Component Value Date    WBC 8.1 10/19/2020    HGB 12.5 (L) 10/19/2020    HCT 40.2 10/19/2020     10/19/2020    CRP 10.6 (H) 08/06/2019    SED 5 01/17/2019     10/19/2020    POTASSIUM 4.2 10/19/2020    CHLORIDE 108 10/19/2020    CO2 28 10/19/2020    BUN 10 10/19/2020    CR 0.99 10/19/2020     (H) 10/19/2020    NILSON 8.4 (L) 10/19/2020    PHOS 3.0 10/04/2020    MAG 2.0 10/05/2020    ALBUMIN 2.3 (L) 10/19/2020    PROTTOTAL 5.7 (L) 10/19/2020    ALT 14 10/19/2020    AST 10 10/19/2020    ALKPHOS 57 10/19/2020    BILITOTAL 0.8 10/19/2020    LIPASE 665 (H) 10/19/2020    AMYLASE 56 03/29/2018    PTT 41 (H) 04/16/2018    INR 2.18 (H) 10/19/2020    TSH 1.58  "02/04/2020    T4 1.20 12/18/2012       Preop Vitals  BP Readings from Last 3 Encounters:   10/19/20 101/56   10/05/20 139/63   08/10/20 138/78    Pulse Readings from Last 3 Encounters:   10/19/20 68   10/05/20 78   08/10/20 95      Resp Readings from Last 3 Encounters:   10/19/20 16   10/05/20 16   08/10/20 18    SpO2 Readings from Last 3 Encounters:   10/19/20 93%   10/05/20 92%   08/10/20 94%      Temp Readings from Last 1 Encounters:   10/19/20 36.8  C (98.2  F) (Oral)    Ht Readings from Last 1 Encounters:   10/17/20 1.778 m (5' 10\")      Wt Readings from Last 1 Encounters:   10/17/20 89.1 kg (196 lb 6.9 oz)    Estimated body mass index is 28.18 kg/m  as calculated from the following:    Height as of this encounter: 1.778 m (5' 10\").    Weight as of this encounter: 89.1 kg (196 lb 6.9 oz).       Anesthesia Plan      History & Physical Review  History and physical reviewed and following examination; no interval change.    ASA Status:  3 .    NPO Status:  > 6 hours    Plan for MAC Reason for MAC:  Deep or markedly invasive procedure (G8)           Postoperative Care      Consents  Anesthetic plan, risks, benefits and alternatives discussed with:  Patient..                 Sky Voss CRNA, APRN CRNA  "

## 2020-10-19 NOTE — PROGRESS NOTES
Pt ambulating independently in room.  No complaints of pain.  Hungry since pt hasn't eaten since Friday.  Voiding without difficulty.  Enc to call for needs

## 2020-10-19 NOTE — PROGRESS NOTES
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  Patient states his pain this time is similar to previous episodes of abdominal pain.    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  He is followed at [facility]. Patient is scheduled to have MRCP with secretin study on 11/13/2020.    Patient continues to drink alcohol regularly, last drink 2 days ago.  No history of alcohol withdrawal.  In the ED lipase was found to be elevated at 1100, white blood count elevated at 14.    CT abdomen pelvis showed:  1.  Compared to exam of 2 weeks ago there is been improvement; decrease in peripancreatic inflammation and decreasing inflammatory wall thickening of the adjacent stomach.  2.  Stable postoperative changes after splenectomy and distal pancreatectomy. Stable postoperative appearances of bowel.  3.  No new abnormality.  Patient was given 2 L IV fluids and his pain was controlled with Dilaudid 0.5 mg.     It's possible patient has recurrent pancreatitis though CT actually improving. Gastritis was considered last admission given CT findings, and outpatient EGD to be considered if recurrent symptoms.     EGD shows edematous stomach wall but no evidence of gastritis or ulcers.  Review of CTs with radiology shows edematous  appearing stomach wall dating at least last June if not earlier.  Abdominal pain is improving.   - continue IVF and pain control   - can have some clears tonight and if doing well will advance diet in the morning   -Appears can stop the PPI as there is no gastritis seen   -Patient has MRCP scheduled November 13 and follow-up with Dr. Beasley for November 16.  I do not think he would need to be seen sooner.    Hypertension, benign essential, goal below 140/90  Continue home medication lisinopril and Toprol-XL      Peripheral vascular disease (H)    CAD (coronary artery disease)  Cath showed moderate coronary disease in 2009, no stents placed  Continue home Crestor 10 mg daily      Type 2 diabetes mellitus with diabetic polyneuropathy, without long-term current use of insulin (H)  Home medication Novolin 34 units in the morning and 32 units at night  Patient is n.p.o.  Hold basal insulin for now and start patient on ISS    .       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  Resume home Crestor 10 mg.      Alcohol dependence, uncomplicated (H)  No history of alcohol withdrawal  Last drink 2 days ago.       Diet: Clear Liquid Diet  DVT Prophylaxis: Warfarin  Chavez Catheter: not present  Code Status:   Full code  Lines: peripheral IV Line     Discussion: Improving.  Likely home tomorrow if continues to tolerate diet    Attestation:  Total time: 25 minutes    Neo Shetty MD  [facility]        Interval History   Minimal abdominal pain today.  EGD went well.  Tolerating clears.  No chest pain or shortness of breath.    Physical Exam   Temp:  [97.2  F (36.2  C)-98.4  F (36.9  C)] 97.2  F (36.2   C)  Pulse:  [68-82] 74  Resp:  [16-18] 18  BP: ()/(54-64) 116/58  SpO2:  [92 %-96 %] 96 %    Weights:   Vitals:    10/17/20 1258 10/17/20 2338   Weight: 90.7 kg (200 lb) 89.1 kg (196 lb 6.9 oz)    Body mass index is 28.18 kg/m .    Constitutional: Alert and oriented, no acute distress  CV: Irregular, no edema  Respiratory: CTA bilaterally  GI: Soft, mild tenderness in epigastrium, bowel sounds normal  Skin: Warm and dry    Data   Recent Labs   Lab 10/19/20  0528 10/17/20  1339 10/16/20  0833   WBC 8.1 14.0*  --    HGB 12.5* 15.6  --    MCV 99 96  --     336  --    INR 2.18* 2.20* 2.10*    136  --    POTASSIUM 4.2 4.4  --    CHLORIDE 108 101  --    CO2 28 32  --    BUN 10 14  --    CR 0.99 1.12  --    ANIONGAP 3 3  --    NILSON 8.4* 10.0  --    * 185*  --    ALBUMIN 2.3* 3.4  --    PROTTOTAL 5.7* 8.1  --    BILITOTAL 0.8 0.6  --    ALKPHOS 57 77  --    ALT 14 22  --    AST 10 14  --    LIPASE 665* 1,011*  --    TROPI  --  <0.015  --        Recent Labs   Lab 10/19/20  1641 10/19/20  1141 10/19/20  0817 10/19/20  0528 10/19/20  0444 10/19/20  0106 10/18/20  1956 10/17/20  1339 10/17/20  1339   GLC  --   --   --  192*  --   --   --   --  185*   * 143* 181*  --  167* 149* 106*   < >  --     < > = values in this interval not displayed.        Unresulted Labs Ordered in the Past 30 Days of this Admission     Date and Time Order Name Status Description    10/19/2020 0958 Surgical pathology exam In process            Imaging: No results found for this or any previous visit (from the past 24 hour(s)).     I reviewed all new labs and imaging results over the last 24 hours. I personally reviewed the abdominal CT image(s) showing See notes above.  Images were reviewed radiology.    Medications     dextrose 5% and 0.9% NaCl 150 mL/hr at 10/19/20 1549     Warfarin Therapy Reminder         insulin aspart  1-4 Units Subcutaneous Q4H     lisinopril  5 mg Oral Daily     metoprolol succinate ER  75 mg  Oral BID     pantoprazole  40 mg Oral Daily     rosuvastatin  10 mg Oral Daily     sodium chloride (PF)  3 mL Intracatheter Q8H     tamsulosin  0.4 mg Oral Daily     warfarin ANTICOAGULANT  1.25 mg Oral ONCE at 18:00   Reviewed yulisa Shetty MD  [facility] Medicine

## 2020-10-19 NOTE — PLAN OF CARE
Major Shift Events:  Patient reported adequate rest overnight. Pain was managed with prn IV dilaudid x3. D5 + 0.9 NS at 150/hr. NPO.   Plan: Continue to monitor and notify MD of any changes.   For vital signs and complete assessments, please see documentation flowsheets.

## 2020-10-19 NOTE — PROGRESS NOTES
Patient has some mild pain but declines need for pain medicine this evening.  Up ad guerrero.  Blood sugar 98 and then 79, patient is NPO, normal saline infusing.  Updated MD, fluids changed to D5NS.

## 2020-10-19 NOTE — PHARMACY-ANTICOAGULATION SERVICE
Clinical Pharmacy - Warfarin Dosing Consult     Pharmacy has been consulted to manage this patient s warfarin therapy.  Indication: Atrial Fibrillation  Therapy Goal: INR 2-3  Provider/Team: [facility]  Warfarin PTA Regimen: 2.5mg Tues/Thurs/Sat, 1.25mg ROW  Dose Comments: EGD earlier today-restart warfarin per Dr Shetty- HOME DOSE 1.25 mg today    INR   Date Value Ref Range Status   10/19/2020 2.18 (H) 0.86 - 1.14 Final   10/17/2020 2.20 (H) 0.86 - 1.14 Final       Recommend warfarin 1.25 mg today.  Pharmacy will monitor Antoine Russo daily and order warfarin doses to achieve specified goal.      Please contact pharmacy as soon as possible if the warfarin needs to be held for a procedure or if the warfarin goals change.

## 2020-10-19 NOTE — H&P
74 year old year old male here for upper endoscopy for gastric changes seen on CT scan.        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)     Peripheral vascular disease (H)     Colitis     Groin fluid collection     Clostridium difficile enterocolitis     Health Care Home     H/O colectomy     IPMN (intraductal papillary mucinous neoplasm)     Pancreatic duct leak     Type 2 diabetes mellitus with diabetic polyneuropathy, without long-term current use of insulin (H)     Left varicocele     Anticipated difficulty with intubation     Spleen absent     Chronic atrial fibrillation (H)     Recurrent pancreatitis     Long term current use of anticoagulant therapy     Acute pancreatitis     Pancreatic pseudocyst     Alcohol dependence, uncomplicated (H)     Acute right-sided low back pain with left-sided sciatica     Spinal stenosis of lumbar region, unspecified whether neurogenic claudication present     Peripheral polyneuropathy     Chronic low back pain with sciatica, sciatica laterality unspecified, unspecified back pain laterality     Acute gastritis     Acute recurrent pancreatitis     Acute gastritis without hemorrhage, unspecified gastritis type       Past Medical History:   Diagnosis Date     Acute kidney injury (H) 4/17/2019     Acute on chronic pancreatitis (H) 1/23/2018     Bacteriuria with pyuria 4/17/2019     C. difficile colitis      Colon polyp      Colon polyp 8/26/2011    Colonoscopy 8/2011-A sessile polyp was found in the cecum. The polyp was 6 mm in size. The polyp was removed with  a hot snare. Resection and retrieval were complete. A sessile polyp was found in the proximal transverse colon. The polyp was 15 mm in size. The polyp was removed with a hot snare. Resection and retrieval were complete. A sessile polyp was found in the sigmoid colon. The polyp was 5 mm     Coronary artery disease      Diabetes mellitus (H)     type 2     Diverticulitis      Diverticulitis of colon 7/17/2007    Colitis on CT Scan 5/2011- [place] [facility] 8/2011 Diverticulitis - Multiple small and large-mouthed diverticula were found in the mid sigmoid colon and at the hepatic flexure. There was narrowing of the colon in association with the diverticular opening. Nena-diverticular erythema was seen. There was evidence of an impacted diverticulum. 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     Noninfectious ileitis      Recurrent pancreatitis        Past Surgical History:   Procedure Laterality Date     BREAST SURGERY  2008    right breast mass benign     COLONOSCOPY      multiple polyps removed     COLONOSCOPY  8/24/2011    Procedure:COMBINED COLONOSCOPY, REMOVE TUMOR/POLYP/LESION BY SNARE; Surgeon:MILEY ARBOLEDA; Location:[facility]     COLONOSCOPY  12/17/2012    Procedure: COLONOSCOPY;;  Surgeon: Leon Maurer MD;  [facility]: [facility]     COLONOSCOPY  12/18/2012    Procedure: COLONOSCOPY;;  Surgeon: Leon Maurer MD;  [facility]     DENERVATION OF SPERMATIC CORD MICROSURGICAL Left 5/23/2017    Procedure: DENERVATION OF SPERMATIC CORD MICROSURGICAL;;  Surgeon: Marcio Aggarwal MD;  [facility]     DISSECTION RADICAL NECK BILATERAL  8/2/2012    Procedure: DISSECTION RADICAL NECK BILATERAL;;  Surgeon: Yung Alvares MD;  [facility]     ENDOSCOPIC RETROGRADE CHOLANGIOPANCREATOGRAM N/A 5/10/2016    Procedure: COMBINED ENDOSCOPIC RETROGRADE CHOLANGIOPANCREATOGRAPHY,  PLACE TUBE/STENT;  Surgeon: Yovanny Beasley MD;  [facility]     ENDOSCOPIC RETROGRADE CHOLANGIOPANCREATOGRAM N/A 3/29/2018    Procedure: ENDOSCOPIC RETROGRADE CHOLANGIOPANCREATOGRAM;  Endoscopic Retrograde Cholangiopancreatogram, Endoscopic Ultrasound, Biliary Sphincterotomy, Biliary and Pancreatic Stent Placement;  Surgeon: Yovanny Beasley MD;  [facility]     ENDOSCOPIC ULTRASOUND UPPER GASTROINTESTINAL TRACT (GI) N/A 2/3/2016    Procedure: ENDOSCOPIC ULTRASOUND, ESOPHAGOSCOPY / UPPER GASTROINTESTINAL TRACT (GI);  Surgeon: Grabiel Plata MD;  [facility]     ENDOSCOPIC ULTRASOUND UPPER GASTROINTESTINAL TRACT (GI) N/A 3/29/2018    Procedure: ENDOSCOPIC ULTRASOUND, ESOPHAGOSCOPY / UPPER GASTROINTESTINAL TRACT (GI);;  Surgeon: Grabiel Plata MD;  [facility]     ESOPHAGOSCOPY, GASTROSCOPY, DUODENOSCOPY (EGD), COMBINED N/A 2/3/2016    Procedure: COMBINED ENDOSCOPIC ULTRASOUND, ESOPHAGOSCOPY, GASTROSCOPY, DUODENOSCOPY (EGD), FINE NEEDLE ASPIRATE/BIOPSY;  Surgeon: Grabiel Plata MD;  [facility]     ESOPHAGOSCOPY, GASTROSCOPY, DUODENOSCOPY (EGD), COMBINED N/A 6/8/2016    Procedure: COMBINED ESOPHAGOSCOPY, GASTROSCOPY, DUODENOSCOPY (EGD), REMOVE FOREIGN BODY;  Surgeon: Yovanny Beasley MD;  [facility]     ESOPHAGOSCOPY, GASTROSCOPY, DUODENOSCOPY (EGD), COMBINED N/A 4/17/2018    Procedure: COMBINED ESOPHAGOSCOPY, GASTROSCOPY, DUODENOSCOPY (EGD), REMOVE FOREIGN BODY;  EGD with stent removal;  Surgeon: Grabiel Plata MD;  [facility]     EXCISE LESION INTRAORAL  6/14/2012    Procedure: EXCISE LESION INTRAORAL;  Wide Local Excision Floor of Mouth, Direct Laryngoscopy, Bilateral Tenmile's Marsuplization, Split Thickness Skin Graft from right Thigh  Latex Safe;  Surgeon: Gerson Ravi MD;  [facility]     EXCISE LESION INTRAORAL  8/2/2012    Procedure: EXCISE LESION INTRAORAL;  Floor of Mouth Resection, Bilateral Selective Radical Neck Dissection,  Tracheostomy, Left Radial Forearm  Free Flap with Alloderm, Nasogastric Feeding Tube Placement,    * Latex Safe*;  Surgeon: Gerson Ravi MD;  [facility]     EXCISE LESION INTRAORAL  2012    Procedure: EXCISE LESION INTRAORAL;  takedown of oral flap;  Surgeon: Yung Alvares MD;  [facility]     GRAFT FREE VASCULARIZED (LOCATION)  2012    Procedure: GRAFT FREE VASCULARIZED (LOCATION);;  Surgeon: Yung Alvares MD;  [facility]     GRAFT SKIN SPLIT THICKNESS FROM EXTREMITY  2012    Procedure: GRAFT SKIN SPLIT THICKNESS FROM EXTREMITY;;  Surgeon: Gerson Ravi MD;  [facility]     LAPAROSCOPIC ILEOSTOMY TAKEDOWN  2013    Procedure: LAPAROSCOPIC ILEOSTOMY TAKEDOWN;  Laparoscopic Closure of Enterostomy, Guerda's Type with IleoRectal Anastomosis ;  Surgeon: Grabiel Riddle MD;  [facility]     LAPAROTOMY EXPLORATORY  2012    Procedure: LAPAROTOMY EXPLORATORY;  Exploratory Laparotomy, total abdominal colectomy, ileostomy formation;  Surgeon: Miquel Cannon MD;  [facility]     LARYNGOSCOPY  2012    Procedure: LARYNGOSCOPY;;  Surgeon: Gerson Ravi MD;  [facility]     ORTHOPEDIC SURGERY      ganglian cyst left ankle     PANCREATECTOMY, SPLENECTOMY N/A 3/10/2016    Procedure: PANCREATECTOMY, SPLENECTOMY;  Surgeon: Nael Abel MD;  [facility]     SHOULDER SURGERY  ,     2006- right rotator cuff, 2008 bone spur on left. Dr. Hdez     VARICOCELECTOMY Left 2017    Procedure: VARICOCELECTOMY;  Left Varicocele Repair, Denervation of Left Testis;  Surgeon: Marcio Aggarwal MD;  [facility]       Family History   Problem Relation Age of Onset     Diabetes Sister         onset age 50     Alzheimer Disease Mother          80     Alzheimer Disease Father          85     Diabetes Other         nephew type 1     Diabetes Other      Aneurysm Sister      Anesthesia Reaction No family hx of      Colon Cancer No family hx of       Colon Polyps No family hx of      Crohn's Disease No family hx of      Ulcerative Colitis No family hx of        No current outpatient medications on file.       Allergies   Allergen Reactions     Nkda [No Known Drug Allergies]        Pt reports that he quit smoking about 13 years ago. His smoking use included cigarettes. He has a 40.00 pack-year smoking history. He has never used smokeless tobacco. He reports current alcohol use.    Exam:    Awake, Alert OX3  Lungs - CTA bilaterally  CV - RRR, no murmurs, distal pulses intact  Abd - soft, non-distended, non-tender, +BS  Extr - No cyanosis or edema    A/P 74 year old year old male in need of upper endoscopy for gastric changes seen on CT scan. Risks, benefits, alternatives, and complications were discussed including the possibility of perforation and the patient agreed to proceed.    Grabiel Oliva MD

## 2020-10-20 ENCOUNTER — DOCUMENTATION ONLY (OUTPATIENT)
Dept: ANTICOAGULATION | Facility: CLINIC | Age: 75
End: 2020-10-20

## 2020-10-20 VITALS
OXYGEN SATURATION: 91 % | WEIGHT: 196.43 LBS | DIASTOLIC BLOOD PRESSURE: 65 MMHG | HEIGHT: 70 IN | SYSTOLIC BLOOD PRESSURE: 148 MMHG | TEMPERATURE: 98 F | HEART RATE: 77 BPM | BODY MASS INDEX: 28.12 KG/M2 | RESPIRATION RATE: 18 BRPM

## 2020-10-20 DIAGNOSIS — I48.20 CHRONIC ATRIAL FIBRILLATION (H): ICD-10-CM

## 2020-10-20 DIAGNOSIS — Z79.01 LONG TERM CURRENT USE OF ANTICOAGULANT THERAPY: ICD-10-CM

## 2020-10-20 LAB
ALBUMIN SERPL-MCNC: 2.5 G/DL (ref 3.4–5)
ALP SERPL-CCNC: 72 U/L (ref 40–150)
ALT SERPL W P-5'-P-CCNC: 19 U/L (ref 0–70)
ANION GAP SERPL CALCULATED.3IONS-SCNC: 4 MMOL/L (ref 3–14)
AST SERPL W P-5'-P-CCNC: 16 U/L (ref 0–45)
BILIRUB SERPL-MCNC: 0.8 MG/DL (ref 0.2–1.3)
BUN SERPL-MCNC: 7 MG/DL (ref 7–30)
CALCIUM SERPL-MCNC: 8.5 MG/DL (ref 8.5–10.1)
CHLORIDE SERPL-SCNC: 112 MMOL/L (ref 94–109)
CO2 SERPL-SCNC: 26 MMOL/L (ref 20–32)
COPATH REPORT: NORMAL
CREAT SERPL-MCNC: 0.88 MG/DL (ref 0.66–1.25)
GFR SERPL CREATININE-BSD FRML MDRD: 85 ML/MIN/{1.73_M2}
GLUCOSE BLDC GLUCOMTR-MCNC: 128 MG/DL (ref 70–99)
GLUCOSE BLDC GLUCOMTR-MCNC: 161 MG/DL (ref 70–99)
GLUCOSE SERPL-MCNC: 146 MG/DL (ref 70–99)
INR PPP: 2.12 (ref 0.86–1.14)
LIPASE SERPL-CCNC: 443 U/L (ref 73–393)
POTASSIUM SERPL-SCNC: 4 MMOL/L (ref 3.4–5.3)
PROT SERPL-MCNC: 6.2 G/DL (ref 6.8–8.8)
SODIUM SERPL-SCNC: 142 MMOL/L (ref 133–144)

## 2020-10-20 PROCEDURE — 36415 COLL VENOUS BLD VENIPUNCTURE: CPT | Performed by: INTERNAL MEDICINE

## 2020-10-20 PROCEDURE — 99239 HOSP IP/OBS DSCHRG MGMT >30: CPT | Performed by: INTERNAL MEDICINE

## 2020-10-20 PROCEDURE — 250N000013 HC RX MED GY IP 250 OP 250 PS 637: Performed by: FAMILY MEDICINE

## 2020-10-20 PROCEDURE — 85610 PROTHROMBIN TIME: CPT | Performed by: INTERNAL MEDICINE

## 2020-10-20 PROCEDURE — 80053 COMPREHEN METABOLIC PANEL: CPT | Performed by: INTERNAL MEDICINE

## 2020-10-20 PROCEDURE — 999N001017 HC STATISTIC GLUCOSE BY METER IP

## 2020-10-20 PROCEDURE — 83690 ASSAY OF LIPASE: CPT | Performed by: INTERNAL MEDICINE

## 2020-10-20 RX ADMIN — TAMSULOSIN HYDROCHLORIDE 0.4 MG: 0.4 CAPSULE ORAL at 08:14

## 2020-10-20 RX ADMIN — METOPROLOL SUCCINATE 75 MG: 25 TABLET, EXTENDED RELEASE ORAL at 08:14

## 2020-10-20 RX ADMIN — ROSUVASTATIN CALCIUM 10 MG: 5 TABLET, FILM COATED ORAL at 08:14

## 2020-10-20 RX ADMIN — LISINOPRIL 5 MG: 5 TABLET ORAL at 08:14

## 2020-10-20 ASSESSMENT — ACTIVITIES OF DAILY LIVING (ADL)
ADLS_ACUITY_SCORE: 11

## 2020-10-20 NOTE — PLAN OF CARE
[facility] DISCHARGE NOTE    Patient discharged to home at 10:14 AM via wheel chair. Accompanied by spouse and staff. Discharge instructions reviewed with patient, opportunity offered to ask questions. Prescriptions - None ordered for discharge. All belongings sent with patient.    Julee Rizvi RN

## 2020-10-20 NOTE — PROGRESS NOTES
Care Management Discharge Note    Discharge Planning:  Expected Discharge Date: 10/20/20     Concerns to be Addressed: no discharge needs identified       Anticipated Discharge Disposition: Home  Patient/family educated on Medicare website which has current facility and service quality ratings: yes  Referrals Placed by CM/SW: Internal Clinic Care Coordination-done      Patient/Family in Agreement with the Plan: yes     Disposition Comments:      Selected Continued Care - Admitted Since 10/17/2020    No services have been selected for the patient.         Additional Information:  Patient ready for discharge today.  He will discharge home with family.  Family will provide transportation upon discharge.      Alina TAYLORN RN  Inpatient Care Coordinator  [facility] [phone]  [facility] [phone]

## 2020-10-20 NOTE — PLAN OF CARE
"Pt denies pain. Continued mild HTN noted per baseline - other vitals stable on RA. /63 (BP Location: Left arm)   Pulse 75   Temp 98.7  F (37.1  C) (Oral)   Resp 18   Ht 1.778 m (5' 10\")   Wt 89.1 kg (196 lb 6.9 oz)   SpO2 95%   BMI 28.18 kg/m    Diet advanced to Full liquid within Diabetic boundaries; BG stable at 150 and 161 with no sliding scale insulin needed. IVMF continue at 150 ml/hr. Anticipate probable discharge in AM.  Ran Zhao RN on 10/20/2020 at 3:16 AM    "

## 2020-10-20 NOTE — DISCHARGE SUMMARY
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  History of pseudocyst  History of pancreatic biliary sphincterotomy March 2019-followed by severe pancreatitis and patric            pancreatic necrosis   Hypertension, benign essential, goal below 140/90  Peripheral vascular disease (H)  CAD (coronary artery disease)  Type 2 diabetes mellitus with diabetic polyneuropathy, without long-term current use of insulin (H)  Spleen absent   Chronic atrial fibrillation (H)  Long term current use of anticoagulant therapy  Hypertrophy of prostate without urinary obstruction  Hyperlipidemia LDL goal <100  Alcohol dependence, uncomplicated (H)    Follow-ups Needed After Discharge   Follow-up Appointments     Follow-up and recommended labs and tests      Follow up with primary care provider, Katie Gross, within 7 days   for hospital follow- up.  The following labs/tests are recommended: BMP   and CBC.           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  Patient states his pain this time is similar to previous episodes of abdominal pain.    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  [facility] is [facility]. Patient is scheduled to have MRCP with secretin study on 11/13/2020.    Patient continues to drink alcohol regularly, last drink 2 days ago.  No history of alcohol withdrawal.  In the ED lipase was found to be elevated at 1100, white blood count elevated at 14.    CT abdomen pelvis showed:  1.  Compared to exam of 2 weeks ago there is been improvement; decrease in peripancreatic inflammation and decreasing inflammatory wall thickening of the adjacent stomach.  2.  Stable postoperative changes after splenectomy and distal pancreatectomy. Stable postoperative appearances of bowel.  3.  No new abnormality.  Patient was given 2 L IV fluids and his pain was controlled with Dilaudid 0.5 mg.     It's possible patient has recurrent pancreatitis though CT actually improving. Gastritis was considered last admission given CT findings, and outpatient EGD to be considered if recurrent symptoms.     EGD shows edematous stomach wall but no evidence of gastritis or ulcers.  Review of CTs with radiology shows edematous appearing stomach wall dating at least last June if not earlier.  Abdominal pain is improving.   - continue IVF and pain control   -Appears can stop the PPI as there is no gastritis seen  - Tolerated advancing diet and was discharged to home   -Patient has MRCP scheduled November 13 and follow-up with Dr. Beasley for November 16.  I do not think he would need to be seen sooner.     Hypertension, benign essential, goal below 140/90  Continue home medication lisinopril and  Toprol-XL       Peripheral vascular disease (H)    CAD (coronary artery disease)  Cath showed moderate coronary disease in 2009, no stents placed  Continue home Crestor 10 mg daily       Type 2 diabetes mellitus with diabetic polyneuropathy, without long-term current use of insulin (H)  Home medication Novolin 34 units in the morning and 32 units at night  Patient is n.p.o.  Hold basal insulin for now and start patient on ISS    .        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  Resume home Crestor 10 mg.       Alcohol dependence, uncomplicated (H)  No history of alcohol withdrawal  Last drink 2 days prior to admission    Consultations This Hospital Stay   CARE MANAGEMENT / SOCIAL WORK IP CONSULT    Code Status   Full Code    Time Spent on this Encounter   I, Yassine Wright MD, personally saw the patient today and spent greater than 30 minutes discharging this patient.       Yassine Wright MD  [facility]   ______________________________________________________________________    Physical Exam   Vital Signs: Temp: 98  F (36.7  C) Temp src: Oral BP: (!) 148/65 Pulse: 77   Resp: 18 SpO2: 91 % O2 Device: None (Room air)    Weight: 196 lbs 6.88 oz       Gen: Well nourished, well developed, alert and oriented x 3, no acute distressed  HEENT: Atraumatic, normocephalic; sclera non-injected, anicterric; oral mucosa moist, no lesion, no exudate  Lungs: Clear to ausculation, no wheezes, no rhonchi, no rales  Heart: Regular rate, regular rhythm, no gallops, no rubs, no murmurs  GI: Bowel sound normal, no hepatosplenomegaly, no masses, non-tender,  non-distended, no guarding, no rebound tenderness  Lymph: No lymphadenopathy, no edema  Skin: No rashes, no chronic venous stasis     Primary Care Physician   Katie Gross    Discharge Orders      Reason for your hospital stay    This is a 74 year old male admitted with acute on chronic pancreatitis.     Follow-up and recommended labs and tests    Follow up with primary care provider, Katie Gross, within 7 days for hospital follow- up.  The following labs/tests are recommended: BMP and CBC.     Activity    Your activity upon discharge: activity as tolerated     Full Code     Diet    Follow this diet upon discharge: Orders Placed This Encounter      Regular Diet       Significant Results and Procedures   Most Recent 3 CBC's:  Recent Labs   Lab Test 10/19/20  0528 10/17/20  1339 10/05/20  0559   WBC 8.1 14.0* 11.2*   HGB 12.5* 15.6 12.8*   MCV 99 96 97    336 187     Most Recent 3 BMP's:  Recent Labs   Lab Test 10/20/20  0650 10/19/20  0528 10/17/20  1339    139 136   POTASSIUM 4.0 4.2 4.4   CHLORIDE 112* 108 101   CO2 26 28 32   BUN 7 10 14   CR 0.88 0.99 1.12   ANIONGAP 4 3 3   NILSON 8.5 8.4* 10.0   * 192* 185*     Most Recent 2 LFT's:  Recent Labs   Lab Test 10/20/20  0650 10/19/20  0528   AST 16 10   ALT 19 14   ALKPHOS 72 57   BILITOTAL 0.8 0.8   ,   Results for orders placed or performed during the hospital encounter of 10/17/20   CT Abdomen Pelvis w Contrast    Narrative    EXAM: CT ABDOMEN PELVIS W CONTRAST  LOCATION: [facility]  DATE/TIME: 10/17/2020 5:15 PM    INDICATION: Epigastric pain. Elevated lipase. History of pancreatitis.  COMPARISON: 10/03/2020  TECHNIQUE: CT scan of the abdomen and pelvis was performed following injection of IV contrast. Multiplanar reformats were obtained. Dose reduction techniques were used.  CONTRAST: 98mL Isovue-370    FINDINGS:   LOWER CHEST: Minimal linear scarring.    HEPATOBILIARY: Diffuse fatty infiltration of liver. Gallbladder  and bile ducts unremarkable.    PANCREAS: Prior distal pancreatectomy. Mild amount of soft tissue stranding surrounds the pancreatic neck and residual body but the degree of peripancreatic inflammation has improved compared to the previous exam. There remains wall thickening of the   adjacent posterior aspect of stomach, again improved compared the previous study. There is no focal peripancreatic fluid collection. Pancreatic head appears normal.    SPLEEN: Prior splenectomy.    ADRENAL GLANDS: Normal.    KIDNEYS/BLADDER: Fetal lobulation with multiple small benign cysts unchanged. There is one small indeterminate 1 cm exophytic cyst lateral left lower pole (series 3 image 99) which is unchanged compared to recent study but is actually smaller compared to   exam of 11/24/2018 and should be benign.    BOWEL: Prior subtotal colectomy with stable postoperative findings, no bowel obstruction.    LYMPH NODES: Normal.    VASCULATURE: Heavy atherosclerotic calcifications.    PELVIC ORGANS: Normal.    MUSCULOSKELETAL: Prior decompression laminectomy.      Impression    IMPRESSION:   1.  Compared to exam of 2 weeks ago there is been improvement; decrease in peripancreatic inflammation and decreasing inflammatory wall thickening of the adjacent stomach.  2.  Stable postoperative changes after splenectomy and distal pancreatectomy. Stable postoperative appearances of bowel.  3.  No new abnormality.       Discharge Medications   Current Discharge Medication List      CONTINUE these medications which have NOT CHANGED    Details   Continuous Blood Gluc  (FREESTYLE LISA 14 DAY READER) JOSE ALBERTO 1 each as needed (use to scan blood sugars from sensor)  Qty: 1 Device, Refills: 0    Associated Diagnoses: Type 2 diabetes mellitus with diabetic polyneuropathy, without long-term current use of insulin (H)      Continuous Blood Gluc Sensor (FREESTYLE LISA 14 DAY SENSOR) MISC 1 each every 14 days  Qty: 2 each, Refills: 11    Associated  Diagnoses: Type 2 diabetes mellitus with diabetic polyneuropathy, without long-term current use of insulin (H)      insulin isophane human (NOVOLIN N FLEXPEN RELION) 100 UNIT/ML injection Take 34 units of N in the am and 32 units in the pm.  Qty: 30 mL, Refills: 5    Associated Diagnoses: Type 2 diabetes mellitus with diabetic polyneuropathy, without long-term current use of insulin (H)      lisinopril (PRINIVIL/ZESTRIL) 5 MG tablet Take 1 tablet (5 mg) by mouth daily  Qty: 90 tablet, Refills: 3    Comments: Fill when needed  Associated Diagnoses: Hypertension, benign essential, goal below 140/90      metoprolol succinate ER (TOPROL-XL) 50 MG 24 hr tablet TAKE 1 & 1/2 (ONE & ONE-HALF) TABLETS BY MOUTH TWICE DAILY  Qty: 270 tablet, Refills: 0    Associated Diagnoses: Hypertension, benign essential, goal below 140/90      multivitamin, therapeutic with minerals (THERA-VIT-M) TABS Take 1 tablet by mouth daily.  Qty: 30 each, Refills: 1    Associated Diagnoses: Surgery aftercare      pantoprazole (PROTONIX) 40 MG EC tablet Take 1 tablet (40 mg) by mouth daily  Qty: 30 tablet, Refills: 1    Comments: May use formulary substitute  Associated Diagnoses: Acute gastritis without hemorrhage, unspecified gastritis type      pregabalin (LYRICA) 75 MG capsule Take 1 capsule (75 mg) by mouth 2 times daily  Qty: 60 capsule, Refills: 5    Associated Diagnoses: Type 2 diabetes mellitus with diabetic neuropathy, with long-term current use of insulin (H)      rosuvastatin (CRESTOR) 10 MG tablet Take 1 tablet (10 mg) by mouth daily  Qty: 90 tablet, Refills: 3    Associated Diagnoses: Hyperlipidemia LDL goal <100      tamsulosin (FLOMAX) 0.4 MG capsule Take 1 capsule (0.4 mg) by mouth daily  Qty: 90 capsule, Refills: 3    Associated Diagnoses: Urgency of urination; Hypertrophy of prostate without urinary obstruction      vitamin B-12 (CYANOCOBALAMIN) 100 MCG tablet Take 100 mcg by mouth daily      warfarin ANTICOAGULANT (COUMADIN) 2.5  MG tablet Take 2.5mg (1 tab) Tues/Thurs/Sat; 1.25mg (1/2 tab) all other days or as directed by the Anticoagulation Clinic  Qty: 70 tablet, Refills: 0    Associated Diagnoses: Chronic atrial fibrillation (H)      insulin pen needle (BD LUIS U/F) 32G X 4 MM miscellaneous USE PEN NEEDLE ONCE DAILY AS DIRECTED  Qty: 60 each, Refills: 0    Associated Diagnoses: Type 2 diabetes mellitus with diabetic polyneuropathy, without long-term current use of insulin (H)           Allergies   Allergies   Allergen Reactions     Nkda [No Known Drug Allergies]

## 2020-10-20 NOTE — PHARMACY-ANTICOAGULATION SERVICE
Clinical Pharmacy- Warfarin Discharge Note  This patient is currently on warfarin for the treatment of Atrial fibrillation.  INR Goal= 2-3  Expected length of therapy lifetime.    Warfarin PTA Regimen: 2.5mg Tues/Thurs/Sat, 1.25mg ROW      Anticoagulation Dose History     Recent Dosing and Labs Latest Ref Rng & Units 10/3/2020 10/4/2020 10/5/2020 10/16/2020 10/17/2020 10/19/2020 10/20/2020    Warfarin 1.25 mg - - 1.25 mg 1.25 mg - - 1.25 mg -    Warfarin 2.5 mg - 2.5 mg - - - - - -    INR 0.86 - 1.14 1.76(H) 1.84(H) 1.96(H) 2.10(H) 2.20(H) 2.18(H) 2.12(H)    INR 0.86 - 1.14 - - - - - - -           Your INR was 2.12 today, so continue your home regimen of coumadin, 2.5 mg Tuesdays, Thursdays and Saturdays and 1.25 mg all other days, starting with a 2.5 mg dose tonight, Tuesday, October 20th. Have your INR checked at your scheduled appointment on October 30th.

## 2020-10-20 NOTE — PROGRESS NOTES
ANTICOAGULATION  MANAGEMENT: Discharge Review    Antoine LOPEZ Rafaela chart reviewed for anticoagulation continuity of care    Hospital Admission on 10/17/2020 for Pancreatitis.    Discharge disposition: Home    Results:    Recent labs: (last 7 days)     10/16/20  0833 10/17/20  1339 10/19/20  0528 10/20/20  0650   INR 2.10* 2.20* 2.18* 2.12*     Anticoagulation inpatient management:     home regimen continued    Anticoagulation discharge instructions:     Warfarin dosing: home regimen continued   Bridging: No   INR goal change: No      Medication changes affecting anticoagulation: No    Additional factors affecting anticoagulation: Yes: Pancreatitis.    Plan     No adjustment to anticoagulation plan needed    Recommended follow up is scheduled    Anticoagulation Calendar updated    Angela Harrell RN

## 2020-10-21 ENCOUNTER — TELEPHONE (OUTPATIENT)
Dept: FAMILY MEDICINE | Facility: CLINIC | Age: 75
End: 2020-10-21

## 2020-10-21 NOTE — TELEPHONE ENCOUNTER
"ED/Discharge Protocol    \"Hi, my name is Lupis Hurt RN, a registered nurse, and I am calling on behalf of Dr. Martino's office at [place].  I am calling to follow up and see how things are going for you after your recent visit.\"    \"I see that you were in the (ER/UC/IP) on 10/17/20.    How are you doing now that you are home?\" pretty good he says    Is patient experiencing symptoms that may require a hospital visit?  no    Discharge Instructions    \"Let's review your discharge instructions.  What is/are the follow-up recommendations?  Pt. Response: follow up with Dr Martino    \"Were you instructed to make a follow-up appointment?\"  Pt. Response: Yes.  Has appointment been made?   Yes      \"When you see the provider, I would recommend that you bring your discharge instructions with you.    Medications    \"How many new medications are you on since your hospitalization/ED visit?\"    0-1  \"How many of your current medicines changed (dose, timing, name, etc.) while you were in the hospital/ED visit?\"   0-1  \"Do you have questions about your medications?\"   No  \"Were you newly diagnosed with heart failure, COPD, diabetes or did you have a heart attack?\"   No  For patients on insulin: \"Did you start on insulin in the hospital or did you have your insulin dose changed?\"   No  Post Discharge Medication Reconciliation Status: discharge medications reconciled, continue medications without change.    Was MTM referral placed (*Make sure to put transitions as reason for referral)?   No    Call Summary    \"Do you have any questions or concerns about your condition or care plan at the moment?\"    No  Triage nurse advice given: call if questions        \"If you have questions or things don't continue to improve, we encourage you contact us through the main clinic number,  [phone].  Even if the clinic is not open, triage nurses are available 24/7 to help you.     We would like you to know that our clinic has extended " "hours (provide information).  We also have urgent care (provide details on closest location and hours/contact info)\"      \"Thank you for your time and take care!\"        "

## 2020-10-21 NOTE — TELEPHONE ENCOUNTER
ED/UC/IP follow up phone call: [facility] discharge 10/20/20  Acute recurrent pancreatitis    RN please call to follow up.    Number of ED visits in past 12 months = 1  \

## 2020-10-23 ENCOUNTER — PATIENT OUTREACH (OUTPATIENT)
Dept: CARE COORDINATION | Facility: CLINIC | Age: 75
End: 2020-10-23

## 2020-10-23 NOTE — PROGRESS NOTES
Clinic Care Coordination Contact  [facility]/Voicemail       Clinical Data: CHW Outreach  Outreach attempted x 1. Left message on patient's voicemail with call back information and requested return call.    Plan: CHW will try to reach patient again in 1-2 business days.    Keesha Manzo  Community Health Worker   [facility]  Care Coordination  [facility]  [email or web address]   Office: [phone]  Fax: [phone]

## 2020-10-23 NOTE — LETTER
[place] CARE COORDINATION  [address] [place] [id number]    October 26, 2020    Antoine Russo  [address] [address]      Dear Antoine,    I am a clinic community health worker who works with Katie Yoder MD at [facility] in [place]. I have been trying to reach you recently to introduce Clinic Care Coordination and to see if there was anything I could assist you with.  Below is a description of clinic care coordination and how I can further assist you.      The clinic care coordination team is made up of a registered nurse,  and community health worker who understand the health care system. The goal of clinic care coordination is to help you manage your health and improve access to the health care system in the most efficient manner. The team can assist you in meeting your health care goals by providing education, coordinating services, strengthening the communication among your providers and supporting you with any resource needs.    Please feel free to contact me at [phone] with any questions or concerns. We are focused on providing you with the highest-quality healthcare experience possible and that all starts with you.     Sincerely,     Keesha Manzo  Community Health Worker   [facility]  Care Coordination  [facility]  [email or web address]   Office: [phone]  Fax: [phone]

## 2020-10-26 ENCOUNTER — VIRTUAL VISIT (OUTPATIENT)
Dept: FAMILY MEDICINE | Facility: CLINIC | Age: 75
End: 2020-10-26
Payer: COMMERCIAL

## 2020-10-26 DIAGNOSIS — Z79.4 TYPE 2 DIABETES MELLITUS WITH DIABETIC NEUROPATHY, WITH LONG-TERM CURRENT USE OF INSULIN (H): Primary | ICD-10-CM

## 2020-10-26 DIAGNOSIS — E11.40 TYPE 2 DIABETES MELLITUS WITH DIABETIC NEUROPATHY, WITH LONG-TERM CURRENT USE OF INSULIN (H): Primary | ICD-10-CM

## 2020-10-26 PROCEDURE — 99495 TRANSJ CARE MGMT MOD F2F 14D: CPT | Mod: 95 | Performed by: FAMILY MEDICINE

## 2020-10-26 NOTE — PROGRESS NOTES
"Antoine Russo is a 74 year old male who is being evaluated via a billable telephone visit.      The patient has been notified of following:     \"This telephone visit will be conducted via a call between you and your physician/provider. We have found that certain health care needs can be provided without the need for a physical exam.  This service lets us provide the care you need with a short phone conversation.  If a prescription is necessary we can send it directly to your pharmacy.  If lab work is needed we can place an order for that and you can then stop by our lab to have the test done at a later time.    Telephone visits are billed at different rates depending on your insurance coverage. During this emergency period, for some insurers they may be billed the same as an in-person visit.  Please reach out to your insurance provider with any questions.    If during the course of the call the physician/provider feels a telephone visit is not appropriate, you will not be charged for this service.\"    Patient has given verbal consent for Telephone visit?  Yes    What phone number would you like to be contacted at? [phone]    How would you like to obtain your AVS? Sarah Murray     Antoine Russo is a 74 year old male who presents via phone visit today for the following health issues:    [facility]       Hospital Follow-up Visit:    Hospital/Nursing Home/IP Rehab Facility: [facility]  Date of Admission: 10/17/2020  Date of Discharge: 10/20/2020  Reason(s) for Admission: Acute pancreatitis      Was your hospitalization related to COVID-19? No   Problems taking medications regularly:  None  Medication changes since discharge: None  Problems adhering to non-medication therapy:  None    Summary of hospitalization:  [facility] discharge summary reviewed  Diagnostic Tests/Treatments reviewed.  Follow up needed: none  Other Healthcare Providers Involved in Patient s Care:         " None  Update since discharge: improved.       Post Discharge Medication Reconciliation: discharge medications reconciled and changed, per note/orders.  Plan of care communicated with patient              rehospitalized for pancreatitis. Had an esophagogastroduodenoscopy by Dr Oliva.    Will have a secretin MRCP in near future.   He has fully recovered    Review of Systems     ROS: 5 point ROS negative except as noted above in HPI, including Gen., Resp., CV, GI &  system review.        Objective          Vitals:  No vitals were obtained today due to virtual visit.    healthy, alert and no distress  PSYCH: Alert and oriented times 3; coherent speech, normal   rate and volume, able to articulate logical thoughts, able   to abstract reason, no tangential thoughts, no hallucinations   or delusions  His affect is normal  RESP: No cough, no audible wheezing, able to talk in full sentences  Remainder of exam unable to be completed due to telephone visits  diabetes   Lab Results   Component Value Date    A1C 7.1 10/03/2020    A1C 6.3 05/12/2020    A1C 6.5 02/04/2020    A1C 9.0 10/17/2019    A1C 9.7 07/18/2019              Assessment/Plan:    Assessment & Plan     Pancreatitis, recurrent  resolved  - **CBC with platelets FUTURE anytime; Future  - **Comprehensive metabolic panel FUTURE anytime; Future    Type 2 diabetes mellitus with diabetic neuropathy, with long-term current use of insulin (H)  Well controlled   - **CBC with platelets FUTURE anytime; Future  - **Comprehensive metabolic panel FUTURE anytime; Future       Continue insulin      Return in about 6 weeks (around 12/7/2020) for diabetes.    Katie Yoder MD  [facility]    Phone call duration:  11 minutes

## 2020-10-26 NOTE — PROGRESS NOTES
Clinic Care Coordination Contact  [facility]/Voicemail       Clinical Data: CHW Outreach  Outreach attempted x 2. Left message on patient's voicemail with call back information and requested return call.    Plan: CHW will send unable to contact letter with care coordinator contact information via [employer]. CHW will do no further outreaches at this time.    Keesha Manzo  Community Health Worker   [facility]  Care Coordination  [facility] and [facility]  [email or web address]   Office: [phone]  Fax: [phone]

## 2020-10-30 ENCOUNTER — ANTICOAGULATION THERAPY VISIT (OUTPATIENT)
Dept: ANTICOAGULATION | Facility: CLINIC | Age: 75
End: 2020-10-30

## 2020-10-30 DIAGNOSIS — I48.20 CHRONIC ATRIAL FIBRILLATION (H): ICD-10-CM

## 2020-10-30 DIAGNOSIS — Z79.01 LONG TERM CURRENT USE OF ANTICOAGULANT THERAPY: ICD-10-CM

## 2020-10-30 DIAGNOSIS — Z79.4 TYPE 2 DIABETES MELLITUS WITH DIABETIC NEUROPATHY, WITH LONG-TERM CURRENT USE OF INSULIN (H): ICD-10-CM

## 2020-10-30 DIAGNOSIS — E11.40 TYPE 2 DIABETES MELLITUS WITH DIABETIC NEUROPATHY, WITH LONG-TERM CURRENT USE OF INSULIN (H): ICD-10-CM

## 2020-10-30 LAB
ALBUMIN SERPL-MCNC: 3 G/DL (ref 3.4–5)
ALP SERPL-CCNC: 87 U/L (ref 40–150)
ALT SERPL W P-5'-P-CCNC: 37 U/L (ref 0–70)
ANION GAP SERPL CALCULATED.3IONS-SCNC: 5 MMOL/L (ref 3–14)
AST SERPL W P-5'-P-CCNC: 37 U/L (ref 0–45)
BILIRUB SERPL-MCNC: 0.3 MG/DL (ref 0.2–1.3)
BUN SERPL-MCNC: 22 MG/DL (ref 7–30)
CALCIUM SERPL-MCNC: 10 MG/DL (ref 8.5–10.1)
CHLORIDE SERPL-SCNC: 103 MMOL/L (ref 94–109)
CO2 SERPL-SCNC: 30 MMOL/L (ref 20–32)
CREAT SERPL-MCNC: 1.03 MG/DL (ref 0.66–1.25)
ERYTHROCYTE [DISTWIDTH] IN BLOOD BY AUTOMATED COUNT: 17 % (ref 10–15)
GFR SERPL CREATININE-BSD FRML MDRD: 72 ML/MIN/{1.73_M2}
GLUCOSE SERPL-MCNC: 156 MG/DL (ref 70–99)
HCT VFR BLD AUTO: 44 % (ref 40–53)
HGB BLD-MCNC: 13.9 G/DL (ref 13.3–17.7)
INR PPP: 1.8 (ref 0.86–1.14)
MCH RBC QN AUTO: 30.2 PG (ref 26.5–33)
MCHC RBC AUTO-ENTMCNC: 31.6 G/DL (ref 31.5–36.5)
MCV RBC AUTO: 96 FL (ref 78–100)
PLATELET # BLD AUTO: 317 10E9/L (ref 150–450)
POTASSIUM SERPL-SCNC: 5.2 MMOL/L (ref 3.4–5.3)
PROT SERPL-MCNC: 7.5 G/DL (ref 6.8–8.8)
RBC # BLD AUTO: 4.6 10E12/L (ref 4.4–5.9)
SODIUM SERPL-SCNC: 138 MMOL/L (ref 133–144)
WBC # BLD AUTO: 7.7 10E9/L (ref 4–11)

## 2020-10-30 PROCEDURE — 80053 COMPREHEN METABOLIC PANEL: CPT | Performed by: FAMILY MEDICINE

## 2020-10-30 PROCEDURE — 85610 PROTHROMBIN TIME: CPT | Performed by: FAMILY MEDICINE

## 2020-10-30 PROCEDURE — 85027 COMPLETE CBC AUTOMATED: CPT | Performed by: FAMILY MEDICINE

## 2020-10-30 PROCEDURE — 36415 COLL VENOUS BLD VENIPUNCTURE: CPT | Performed by: FAMILY MEDICINE

## 2020-10-30 NOTE — PROGRESS NOTES
ANTICOAGULATION MANAGEMENT     Patient Name:  Antoine Russo  Date:  10/30/2020    ASSESSMENT /SUBJECTIVE:    Today's INR result of 1.80 is subtherapeutic. Goal INR of 2.0-3.0      Warfarin dose taken: Warfarin taken as instructed - [atient states there was one day in the hospital where they skipped his dose due to the EGD. No missed doses since he has been home.    Diet: Change in alcohol intake may be affecting INR. less alcohol, states it's not a huge change     Medication changes/ interactions: No new medications/supplements affecting INR    Previous INR: Therapeutic 2.12    S/S of bleeding or thromboembolism: No    New injury or illness: Yes: recent hospitalization for pancreatitis     Upcoming surgery, procedure or cardioversion: No    Additional findings: None      PLAN:    Telephone call with Antoine regarding INR result and instructed:     Warfarin Dosing Instructions: 2.5mg today then continue your current warfarin dose of 2.5mg Tues/Thurs/Sat; 1.25mg all other days    Instructed patient to follow up no later than: 2 weeks  Lab visit scheduled    Education provided: Contact the anticoagulation clinic with any changes, questions or concerns at #[phone]       Poli verbalizes understanding and agrees to warfarin dosing plan.    Instructed to call the Anticoagulation Clinic for any changes, questions or concerns. (#[phone])        Adeline Duke RN CACP       OBJECTIVE:  Recent labs: (last 7 days)     10/30/20  0853   INR 1.80*         INR assessment SUB    Recheck INR In: 2 WEEKS    INR Location Clinic      Anticoagulation Summary  As of 10/30/2020    INR goal:  2.0-3.0   TTR:  49.6 % (11.5 mo)   INR used for dosin.80 (10/30/2020)   Warfarin maintenance plan:  2.5 mg (2.5 mg x 1) every Tue, Thu, Sat; 1.25 mg (2.5 mg x 0.5) all other days   Full warfarin instructions:  10/30: 2.5 mg; Otherwise 2.5 mg every Tue, Thu, Sat; 1.25 mg all other days   Weekly warfarin total:  12.5 mg   Plan  last modified:  Adeline Duke RN (9/4/2020)   Next INR check:  11/12/2020   Priority:  High   Target end date:  Indefinite    Indications    Chronic atrial fibrillation (H) [I48.20]  Long term current use of anticoagulant therapy [Z79.01]             Anticoagulation Episode Summary     INR check location:      Preferred lab:      Send INR reminders to:  [facility]    Comments:  *       Anticoagulation Care Providers     Provider Role Specialty Phone number    Katie Martino MD Referring Family Medicine [phone]

## 2020-11-12 ENCOUNTER — ANTICOAGULATION THERAPY VISIT (OUTPATIENT)
Dept: ANTICOAGULATION | Facility: CLINIC | Age: 75
End: 2020-11-12

## 2020-11-12 DIAGNOSIS — I48.20 CHRONIC ATRIAL FIBRILLATION (H): ICD-10-CM

## 2020-11-12 DIAGNOSIS — Z79.01 LONG TERM CURRENT USE OF ANTICOAGULANT THERAPY: ICD-10-CM

## 2020-11-12 LAB
CAPILLARY BLOOD COLLECTION: NORMAL
INR PPP: 2.3 (ref 0.86–1.14)

## 2020-11-12 PROCEDURE — 36416 COLLJ CAPILLARY BLOOD SPEC: CPT | Performed by: FAMILY MEDICINE

## 2020-11-12 PROCEDURE — 85610 PROTHROMBIN TIME: CPT | Performed by: FAMILY MEDICINE

## 2020-11-12 NOTE — PROGRESS NOTES
ANTICOAGULATION MANAGEMENT     Patient Name:  Antoine Russo  Date:  2020    ASSESSMENT /SUBJECTIVE:    Today's INR result of 2.30 is therapeutic. Goal INR of 2.0-3.0      Warfarin dose taken: Warfarin taken as instructed    Diet: No new diet changes affecting INR    Medication changes/ interactions: No new medications/supplements affecting INR    Previous INR: Subtherapeutic 1.80    S/S of bleeding or thromboembolism: No    New injury or illness: No    Upcoming surgery, procedure or cardioversion: No    Additional findings: None      PLAN:    Telephone call with Antoine regarding INR result and instructed:     Warfarin Dosing Instructions: Continue your current warfarin dose 2.5mg Tues/Thurs/Sat; 1.25mg all other days    Instructed patient to follow up no later than: 2 weeks  Check at provider office visit    Education provided: Contact the anticoagulation clinic with any changes, questions or concerns at #[phone]       Poli verbalizes understanding and agrees to warfarin dosing plan.    Instructed to call the Anticoagulation Clinic for any changes, questions or concerns. (#[phone])        Adeline Duke RN CACP       OBJECTIVE:  Recent labs: (last 7 days)     20  1037   INR 2.30*         INR assessment THER    Recheck INR In: 2 WEEKS    INR Location Clinic      Anticoagulation Summary  As of 2020    INR goal:  2.0-3.0   TTR:  49.1 % (11.5 mo)   INR used for dosin.30 (2020)   Warfarin maintenance plan:  2.5 mg (2.5 mg x 1) every Tue, Thu, Sat; 1.25 mg (2.5 mg x 0.5) all other days   Full warfarin instructions:  2.5 mg every Tue, Thu, Sat; 1.25 mg all other days   Weekly warfarin total:  12.5 mg   No change documented:  Adeline Duke RN   Plan last modified:  Adeline Duke RN (2020)   Next INR check:  2020   Priority:  High   Target end date:  Indefinite    Indications    Chronic atrial fibrillation (H) [I48.20]  Long term current use of  anticoagulant therapy [Z79.01]             Anticoagulation Episode Summary     INR check location:      Preferred lab:      Send INR reminders to:  [facility]    Comments:  *       Anticoagulation Care Providers     Provider Role Specialty Phone number    Katie Martino MD Referring Family Medicine [phone]

## 2020-11-13 ENCOUNTER — ANCILLARY PROCEDURE (OUTPATIENT)
Dept: MRI IMAGING | Facility: CLINIC | Age: 75
End: 2020-11-13
Attending: INTERNAL MEDICINE
Payer: COMMERCIAL

## 2020-11-13 VITALS — WEIGHT: 196 LBS | BODY MASS INDEX: 28.12 KG/M2

## 2020-11-13 PROCEDURE — 74183 MRI ABD W/O CNTR FLWD CNTR: CPT | Mod: GC | Performed by: RADIOLOGY

## 2020-11-13 PROCEDURE — A9585 GADOBUTROL INJECTION: HCPCS | Performed by: RADIOLOGY

## 2020-11-13 RX ORDER — GADOBUTROL 604.72 MG/ML
10 INJECTION INTRAVENOUS ONCE
Status: COMPLETED | OUTPATIENT
Start: 2020-11-13 | End: 2020-11-13

## 2020-11-13 RX ADMIN — GADOBUTROL 9 ML: 604.72 INJECTION INTRAVENOUS at 07:16

## 2020-11-13 NOTE — DISCHARGE INSTRUCTIONS
MRI Contrast Discharge Instructions    The IV contrast you received today will pass out of your body in your  urine. This will happen in the next 24 hours. You will not feel this process.  Your urine will not change color.    Drink at least 4 extra glasses of water or juice today (unless your doctor  has restricted your fluids). This reduces the stress on your kidneys.  You may take your regular medicines.    If you are on dialysis: It is best to have dialysis today.    If you have a reaction: Most reactions happen right away. If you have  any new symptoms after leaving the hospital (such as hives or swelling),  call your hospital at the correct number below. Or call your family doctor.  If you have breathing distress or wheezing, call 911.    Special instructions: ***    I have read and understand the above information.    Signature:______________________________________ Date:___________    Staff:__________________________________________ Date:___________     Time:__________    [place] [facility] Departments:    ___[place]: [phone]  ___[facility]: [phone]  ___[place]: [phone] ___[facility]: [phone]  ___[facility]: [phone]  ___[facility]: [phone]  ___[phone]  ___[facility]: [phone]  ___[phone]

## 2020-11-18 ENCOUNTER — VIRTUAL VISIT (OUTPATIENT)
Dept: GASTROENTEROLOGY | Facility: CLINIC | Age: 75
End: 2020-11-18
Payer: COMMERCIAL

## 2020-11-18 VITALS — HEIGHT: 70 IN | BODY MASS INDEX: 28.63 KG/M2 | WEIGHT: 200 LBS

## 2020-11-18 PROCEDURE — 99213 OFFICE O/P EST LOW 20 MIN: CPT | Mod: 95 | Performed by: INTERNAL MEDICINE

## 2020-11-18 ASSESSMENT — PAIN SCALES - GENERAL: PAINLEVEL: NO PAIN (0)

## 2020-11-18 ASSESSMENT — MIFFLIN-ST. JEOR: SCORE: 1663.44

## 2020-11-18 NOTE — LETTER
"  11/18/2020       RE: Antoine Russo  [address] [address]      Dear Colleague,    Thank you for referring your patient, Antoine Russo, to the [facility] [place]. Please see a copy of my visit note below.    Antoine Russo is a 74 year old male who is being evaluated via a billable telephone visit.      The patient has been notified of following:     \"This telephone visit will be conducted via a call between you and your physician/provider. We have found that certain health care needs can be provided without the need for a physical exam.  This service lets us provide the care you need with a short phone conversation.  If a prescription is necessary we can send it directly to your pharmacy.  If lab work is needed we can place an order for that and you can then stop by our lab to have the test done at a later time.    Telephone visits are billed at different rates depending on your insurance coverage. During this emergency period, for some insurers they may be billed the same as an in-person visit.  Please reach out to your insurance provider with any questions.    If during the course of the call the physician/provider feels a telephone visit is not appropriate, you will not be charged for this service.\"    Patient has given verbal consent for Telephone visit?  Yes    What phone number would you like to be contacted at? [phone]    How would you like to obtain your AVS? Palingen    Phone call duration: 20 minutes    Dr Cherri HERMAN  Recurrent acute pancreatitis. Post distal pancreatectomy for IPMN. Multiple episodes, several per year of AP requiring admission.  In hospitalstal 1 month ago for three days at [facility]  Now feeling well.  Recent MRCP below    Study Result    MRI ABDOMEN     CLINICAL HISTORY:  Recurrent pancreatitis     TECHNIQUE:  Images were acquired with and without intravenous contrast  through the abdomen. The following MR " images were acquired: TrueFISP,  multiplanar T2 weighted, axial T1 in/out of phase, axial fat-saturated  T1, diffusion-weighted. Multiplanar T1-weighted images with fat  saturation were before contrast administration and at multiple time  points following the administration of intravenous contrast.      Contrast dose: 9mL Gadavist     Comparison study: CT 10/17/20     FINDINGS:  Liver: The liver is normal in size and configuration. T2 hyperintense  focus along the inferior liver margin measures 1.7 x 0.8 cm (series  16, image 30) and does not demonstrate arterial enhancement, likely  hepatic cyst. Arterial enhancing focus within hepatic segment 8  measures 1 x 0.9 cm, presumably measuring 1.0 x 0.7 cm on comparison  CT. No associated pseudocapsule or washout. Doppler amount of phase  imaging. No suspicious diffusion restricting foci.     Gallbladder: No evidence of cholelithiasis, gallbladder wall  thickening or pericholecystic fluid. The common bile duct is  nondilated.     Spleen: Postsurgical changes of splenectomy.     Kidneys: Multiple T2 hyperintense bilateral renal foci without  suspicious enhancement fetal lobulation. Nonenhancing exophytic T2  hypointense focus of the inferior pole of the left kidney measures 1 x  1 cm in coronal plane.     Adrenal glands: Mild left adrenal thickening. The right adrenal gland  appears unremarkable. No focal adrenal lesion.     Pancreas: Postsurgical changes of partial pancreatectomy. There is  mild peripancreatic inflammation as are the fatty stranding, which is  significantly improved since 10/17/2020. In the distal pancreatectomy  bed, there is significant tethering noted (as seen on images 40  through 45 of series 45. This is difficult to fully characterize due  to artifact from surgical clips, particularly on the T2-weighted  sequences. There appears to be retained products extending superiorly  from this region of tethering (images 27 through 39 of series  43).  Additionally, that is additional tract noted extending along the  inferior aspect of the stomach (images 38 through 41 of series 43).     Multiseptate T2 hyperintense focus in the medial pancreatic head  measures 1.3 x 1.2 cm in the coronal plane (series 4, image 20). The  focus does not appear contiguous with the common bile duct or  pancreatic duct. The pancreatic duct is nondilated. Normal response to  secretin. No evidence for pancreatic ductal leak.     Bowel: Nondilated large and small bowel. Decreased thickening of the  gastric wall. Postsurgical changes of subtotal colectomy.     Lymph nodes: Prominent periportal lymph nodes, not enlarged. Prominent  perigastric lymph nodes measuring up to 8 mm, unchanged.     Blood vessels: The visualized abdominal vasculature appears patent and  nonaneurysmal.     Lung bases: No focal consolidation.     Bones and soft tissues: No suspicious osseous lesion.     Mesentery and abdominal wall: Multifocal T2 hyperintensities along the  left ventral abdominal wall, likely secondary to injections.     Ascites: No free fluid.                                                                      IMPRESSION:  1. Residual mild pancreatitis, significantly improved since prior  exam. Persistent tethering in the pancreatectomy bed, involving distal  pancreas and stomach with suggestion of 2 tracts as discussed above.  No large fluid collection is noted. No evidence for pancreatic ductal  leak.  1a. T2 hyperintense nonenhancing multiseptate cystic focus of the  pancreatic head without communication to the common bile duct or  pancreatic duct. Pseudocyst versus IPMN. Attention on follow-up.  2. Stable arterial enhancing focus of the hepatic segment 8 without  pseudocapsule or washout. Findings may represent transient hepatic  perfusion abnormality.  3. Stable postsurgical changes of splenectomy and distal  pancreatectomy.  4. Decreased gastric wall thickening.  5. Stable prominent  periportal and perigastric lymph nodes.  6. Nonenhancing exophytic T2 hyperintense focus of the inferior pole  of the left kidney. Differential includes hemorrhagic cyst although  papillary renal cell carcinoma is not excluded.     I have personally reviewed the examination and initial interpretation  and I agree with the findings.     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  History of pseudocyst  History of pancreatic biliary sphincterotomy March 2019-followed by severe pancreatitis and patric            pancreatic necrosis   Hypertension, benign essential, goal below 140/90  Peripheral vascular disease (H)  CAD (coronary artery disease)  Type 2 diabetes mellitus with diabetic polyneuropathy, without long-term current use of insulin (H)  Spleen absent   Chronic atrial fibrillation (H)  Long term current use of anticoagulant therapy  Hypertrophy of prostate without urinary obstruction  Hyperlipidemia LDL goal <100  Alcohol dependence, uncomplicated (H)    IMP: 13 min phone consultation. Unexplained recurrent acute pancreatitis over many years. Post distal pancreatectomy for IPMN. Most recent MRCP shows resolving pancreatitis, but fluid collection in residual head of pancreas. UNcertain whether post inflammatory or new IPMN. Pt not drinking alcohol per him.   Thus main quesiton Is whether there is either recurrent IPMN in head causing pancreatitis, or occult ETOH. Vs idopathic  REC:  1) CT pancreas protocol at [facility] in 6 weeks  2) Follow-up w Dr Beasley after that  3) If suggestion of persistent collection, then arrange EUS    Yovanny Beasley MD  Gastroenterology, Pancreas and Biliary Disorders  [facility]

## 2020-11-18 NOTE — PROGRESS NOTES
"Antoine Russo is a 74 year old male who is being evaluated via a billable telephone visit.      The patient has been notified of following:     \"This telephone visit will be conducted via a call between you and your physician/provider. We have found that certain health care needs can be provided without the need for a physical exam.  This service lets us provide the care you need with a short phone conversation.  If a prescription is necessary we can send it directly to your pharmacy.  If lab work is needed we can place an order for that and you can then stop by our lab to have the test done at a later time.    Telephone visits are billed at different rates depending on your insurance coverage. During this emergency period, for some insurers they may be billed the same as an in-person visit.  Please reach out to your insurance provider with any questions.    If during the course of the call the physician/provider feels a telephone visit is not appropriate, you will not be charged for this service.\"    Patient has given verbal consent for Telephone visit?  Yes    What phone number would you like to be contacted at? [phone]    How would you like to obtain your AVS? Zalicushart    Phone call duration: 20 minutes    Dr Gross Primary MD  Recurrent acute pancreatitis. Post distal pancreatectomy for IPMN. Multiple episodes, several per year of AP requiring admission.  In [facility] 1 month ago for three days at [facility]  Now feeling well.  Recent MRCP below    Study Result    MRI ABDOMEN     CLINICAL HISTORY:  Recurrent pancreatitis     TECHNIQUE:  Images were acquired with and without intravenous contrast  through the abdomen. The following MR images were acquired: TrueFISP,  multiplanar T2 weighted, axial T1 in/out of phase, axial fat-saturated  T1, diffusion-weighted. Multiplanar T1-weighted images with fat  saturation were before contrast administration and at multiple time  points following the administration of " intravenous contrast.      Contrast dose: 9mL Gadavist     Comparison study: CT 10/17/20     FINDINGS:  Liver: The liver is normal in size and configuration. T2 hyperintense  focus along the inferior liver margin measures 1.7 x 0.8 cm (series  16, image 30) and does not demonstrate arterial enhancement, likely  hepatic cyst. Arterial enhancing focus within hepatic segment 8  measures 1 x 0.9 cm, presumably measuring 1.0 x 0.7 cm on comparison  CT. No associated pseudocapsule or washout. Doppler amount of phase  imaging. No suspicious diffusion restricting foci.     Gallbladder: No evidence of cholelithiasis, gallbladder wall  thickening or pericholecystic fluid. The common bile duct is  nondilated.     Spleen: Postsurgical changes of splenectomy.     Kidneys: Multiple T2 hyperintense bilateral renal foci without  suspicious enhancement fetal lobulation. Nonenhancing exophytic T2  hypointense focus of the inferior pole of the left kidney measures 1 x  1 cm in coronal plane.     Adrenal glands: Mild left adrenal thickening. The right adrenal gland  appears unremarkable. No focal adrenal lesion.     Pancreas: Postsurgical changes of partial pancreatectomy. There is  mild peripancreatic inflammation as are the fatty stranding, which is  significantly improved since 10/17/2020. In the distal pancreatectomy  bed, there is significant tethering noted (as seen on images 40  through 45 of series 45. This is difficult to fully characterize due  to artifact from surgical clips, particularly on the T2-weighted  sequences. There appears to be retained products extending superiorly  from this region of tethering (images 27 through 39 of series 43).  Additionally, that is additional tract noted extending along the  inferior aspect of the stomach (images 38 through 41 of series 43).     Multiseptate T2 hyperintense focus in the medial pancreatic head  measures 1.3 x 1.2 cm in the coronal plane (series 4, image 20). The  focus does  not appear contiguous with the common bile duct or  pancreatic duct. The pancreatic duct is nondilated. Normal response to  secretin. No evidence for pancreatic ductal leak.     Bowel: Nondilated large and small bowel. Decreased thickening of the  gastric wall. Postsurgical changes of subtotal colectomy.     Lymph nodes: Prominent periportal lymph nodes, not enlarged. Prominent  perigastric lymph nodes measuring up to 8 mm, unchanged.     Blood vessels: The visualized abdominal vasculature appears patent and  nonaneurysmal.     Lung bases: No focal consolidation.     Bones and soft tissues: No suspicious osseous lesion.     Mesentery and abdominal wall: Multifocal T2 hyperintensities along the  left ventral abdominal wall, likely secondary to injections.     Ascites: No free fluid.                                                                      IMPRESSION:  1. Residual mild pancreatitis, significantly improved since prior  exam. Persistent tethering in the pancreatectomy bed, involving distal  pancreas and stomach with suggestion of 2 tracts as discussed above.  No large fluid collection is noted. No evidence for pancreatic ductal  leak.  1a. T2 hyperintense nonenhancing multiseptate cystic focus of the  pancreatic head without communication to the common bile duct or  pancreatic duct. Pseudocyst versus IPMN. Attention on follow-up.  2. Stable arterial enhancing focus of the hepatic segment 8 without  pseudocapsule or washout. Findings may represent transient hepatic  perfusion abnormality.  3. Stable postsurgical changes of splenectomy and distal  pancreatectomy.  4. Decreased gastric wall thickening.  5. Stable prominent periportal and perigastric lymph nodes.  6. Nonenhancing exophytic T2 hyperintense focus of the inferior pole  of the left kidney. Differential includes hemorrhagic cyst although  papillary renal cell carcinoma is not excluded.     I have personally reviewed the examination and initial  interpretation  and I agree with the findings.     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  History of pseudocyst  History of pancreatic biliary sphincterotomy March 2019-followed by severe pancreatitis and patric            pancreatic necrosis   Hypertension, benign essential, goal below 140/90  Peripheral vascular disease (H)  CAD (coronary artery disease)  Type 2 diabetes mellitus with diabetic polyneuropathy, without long-term current use of insulin (H)  Spleen absent   Chronic atrial fibrillation (H)  Long term current use of anticoagulant therapy  Hypertrophy of prostate without urinary obstruction  Hyperlipidemia LDL goal <100  Alcohol dependence, uncomplicated (H)    IMP: 13 min phone consultation. Unexplained recurrent acute pancreatitis over many years. Post distal pancreatectomy for IPMN. Most recent MRCP shows resolving pancreatitis, but fluid collection in residual head of pancreas. UNcertain whether post inflammatory or new IPMN. Pt not drinking alcohol per him.   Thus main quesiton Is whether there is either recurrent IPMN in head causing pancreatitis, or occult ETOH. Vs idopathic  REC:  1) CT pancreas protocol at [facility] in 6 weeks  2) Follow-up w Dr Beasley after that  3) If suggestion of persistent collection, then arrange EUS    Yovanny Beasley MD  Gastroenterology, Pancreas and Biliary Disorders  [facility]

## 2020-11-18 NOTE — NURSING NOTE
"Chief Complaint   Patient presents with     RECHECK     RTC, will get sMRCP prior, recent admision w/ lipase elevation        Vitals:    11/18/20 0936   Weight: 90.7 kg (200 lb)   Height: 1.778 m (5' 10\")       Body mass index is 28.7 kg/m .      Jacky Monge LPN                        "

## 2020-11-18 NOTE — PATIENT INSTRUCTIONS
Follow up:    Dr. Beasley has outlined the following steps after your recent clinic visit:    REC:  1) CT pancreas protocol at [facility] in 6 weeks- call them to schedule- [phone]    2) Follow-up w Dr Beasley after that, telephone visit scheduled on 1/6/2021 @ 12:30    3) If suggestion of persistent collection, then arrange EUS           Please call with any questions or concerns regarding your clinic visit today.    It is a pleasure being involved in your health care.    Contacts post-consultation depending on your need:    Schedule Clinic Appointments            [phone] # 1   M-F 7:30 - 5 pm    Destiny Cabrales RN Care Coordinator  [phone]     OR Procedure Scheduling                             [phone]    My Chart is available 24 hours a day and is a secure way to access your records and communicate with your care team.  I strongly recommend signing up if you haven't already done so, if you are comfortable with computers.  If you would like to inquire about this or are having problems with My Chart access, you may call [phone] or go online at [email or web address].  Please allow at least 24 hours for a response and extra time on weekends and Holidays.

## 2020-11-20 NOTE — IP AVS SNAPSHOT
[address] [address]    Phone:  [phone]    Fax:  [phone]                                       After Visit Summary   4/16/2018    Antoine Russo    MRN: [id number]           After Visit Summary Signature Page     I have received my discharge instructions, and my questions have been answered. I have discussed any challenges I see with this plan with the nurse or doctor.    ..........................................................................................................................................  Patient/Patient Representative Signature      ..........................................................................................................................................  Patient Representative Print Name and Relationship to Patient    ..................................................               ................................................  Date                                            Time    ..........................................................................................................................................  Reviewed by Signature/Title    ...................................................              ..............................................  Date                                                            Time           Smart goals:    1. Schedule eye exam in first half of 2021 for annual eye exam.  2. Review clarity report once per week to look at time in range. Make insulin change or contact CDE for dose adjustment if time in range is less than 60%.   3. Review pump options and discuss with pump  by end of November.     Lab Result Notifications  • If labs were ordered at your appointment today, we will contact you with results once ALL your labs have resulted, or if there is an abnormal result that needs to be addressed sooner. Please note certain hormone labs can take up to 10 business days to result.     Medication Refill Requests  • Please look over your medications before coming to your appointment, to see if you need any refills.  • Pharmacy (medication) refills   o Call your pharmacy for a refill   o If a new prescription is needed, the pharmacy will contact the clinic for an updated prescription  o It can take up to 3 business days to send the updated prescription, so call the pharmacy before running out of your medication  o Call your pharmacy after 2-3 days to see if your prescription is ready for      [facility] (online medical record):   We highly recommend that you sign up for [facility], if you don't already have this.  You will be able to review scheduled appointments, handle scheduling and canceling Johanna appointments, review your lab work, view your medication list, and ask your provider and educator non-urgent questions, without waiting on the phone.      Phone: [phone]   Email: [email or web address]   Fax: [phone]   To create account visit [email or web address]     Or- our  or I can help you do this.  [facility] should not be used for urgent concerns.       Our Patients are Important  • We want to provide the best care for your child, and you can help us improve.  You may receive a survey asking you about this visit. Please complete this survey, and we will use your  feedback to make improvements.     If you have further questions or concerns after your appointment please send us a message through Rep or call us at [phone].      Thank you,   Dr. Angy Mtz, nurse  Maddi Akbar, nurse

## 2020-11-24 ENCOUNTER — HOSPITAL ENCOUNTER (OUTPATIENT)
Dept: GENERAL RADIOLOGY | Facility: CLINIC | Age: 75
End: 2020-11-24
Attending: FAMILY MEDICINE
Payer: COMMERCIAL

## 2020-11-24 ENCOUNTER — OFFICE VISIT (OUTPATIENT)
Dept: FAMILY MEDICINE | Facility: CLINIC | Age: 75
End: 2020-11-24
Payer: COMMERCIAL

## 2020-11-24 ENCOUNTER — ANTICOAGULATION THERAPY VISIT (OUTPATIENT)
Dept: ANTICOAGULATION | Facility: CLINIC | Age: 75
End: 2020-11-24

## 2020-11-24 VITALS
BODY MASS INDEX: 29.71 KG/M2 | HEIGHT: 69 IN | HEART RATE: 69 BPM | TEMPERATURE: 96.5 F | DIASTOLIC BLOOD PRESSURE: 58 MMHG | SYSTOLIC BLOOD PRESSURE: 134 MMHG | OXYGEN SATURATION: 95 % | RESPIRATION RATE: 18 BRPM | WEIGHT: 200.6 LBS

## 2020-11-24 DIAGNOSIS — Z79.01 LONG TERM CURRENT USE OF ANTICOAGULANT THERAPY: ICD-10-CM

## 2020-11-24 DIAGNOSIS — I10 HYPERTENSION, BENIGN ESSENTIAL, GOAL BELOW 140/90: ICD-10-CM

## 2020-11-24 DIAGNOSIS — M79.671 ACUTE FOOT PAIN, RIGHT: ICD-10-CM

## 2020-11-24 DIAGNOSIS — I48.20 CHRONIC ATRIAL FIBRILLATION (H): ICD-10-CM

## 2020-11-24 DIAGNOSIS — E11.42 TYPE 2 DIABETES MELLITUS WITH DIABETIC POLYNEUROPATHY, WITHOUT LONG-TERM CURRENT USE OF INSULIN (H): ICD-10-CM

## 2020-11-24 DIAGNOSIS — K29.00 ACUTE GASTRITIS WITHOUT HEMORRHAGE, UNSPECIFIED GASTRITIS TYPE: ICD-10-CM

## 2020-11-24 DIAGNOSIS — M84.374A STRESS FRACTURE OF METATARSAL BONE OF RIGHT FOOT, INITIAL ENCOUNTER: ICD-10-CM

## 2020-11-24 DIAGNOSIS — M79.671 ACUTE FOOT PAIN, RIGHT: Primary | ICD-10-CM

## 2020-11-24 LAB
CAPILLARY BLOOD COLLECTION: NORMAL
INR PPP: 1.9 (ref 0.86–1.14)

## 2020-11-24 PROCEDURE — 36416 COLLJ CAPILLARY BLOOD SPEC: CPT | Performed by: FAMILY MEDICINE

## 2020-11-24 PROCEDURE — 99214 OFFICE O/P EST MOD 30 MIN: CPT | Performed by: FAMILY MEDICINE

## 2020-11-24 PROCEDURE — 99207 PR NO CHARGE NURSE ONLY: CPT

## 2020-11-24 PROCEDURE — 73630 X-RAY EXAM OF FOOT: CPT | Mod: RT | Performed by: RADIOLOGY

## 2020-11-24 PROCEDURE — 85610 PROTHROMBIN TIME: CPT | Performed by: FAMILY MEDICINE

## 2020-11-24 RX ORDER — HUMAN INSULIN 100 [IU]/ML
INJECTION, SUSPENSION SUBCUTANEOUS
Qty: 30 ML | Refills: 5 | Status: SHIPPED | OUTPATIENT
Start: 2020-11-24 | End: 2021-07-13

## 2020-11-24 RX ORDER — WARFARIN SODIUM 2.5 MG/1
TABLET ORAL
Qty: 70 TABLET | Refills: 0 | Status: SHIPPED | OUTPATIENT
Start: 2020-11-24 | End: 2021-04-19

## 2020-11-24 RX ORDER — METOPROLOL SUCCINATE 50 MG/1
75 TABLET, EXTENDED RELEASE ORAL 2 TIMES DAILY
Qty: 135 TABLET | Refills: 3 | Status: SHIPPED | OUTPATIENT
Start: 2020-11-24 | End: 2021-04-26

## 2020-11-24 ASSESSMENT — MIFFLIN-ST. JEOR: SCORE: 1645.55

## 2020-11-24 NOTE — PROGRESS NOTES
ANTICOAGULATION FOLLOW-UP CLINIC VISIT    Patient Name:  Antoine Russo  Date:  2020  Contact Type:  Telephone    SUBJECTIVE:  Patient Findings     Positives:  Change in alcohol use    Comments:  No changes in diet, activity level, medications (including over the counter), or health. Patient has cut down a little in alcohol use. He is drinking 1-2 per day since governor's new order. No missed doses of warfarin. Patient took dosing as prescribed. No signs of clots or bleeding concerns. Patient will continue maintenance warfarin dosing with a booster dose today. Recheck in 9 days.          Clinical Outcomes     Comments:  No changes in diet, activity level, medications (including over the counter), or health. Patient has cut down a little in alcohol use. He is drinking 1-2 per day since governor's new order. No missed doses of warfarin. Patient took dosing as prescribed. No signs of clots or bleeding concerns. Patient will continue maintenance warfarin dosing with a booster dose today. Recheck in 9 days.             OBJECTIVE    Recent labs: (last 7 days)     20  0840   INR 1.90*       ASSESSMENT / PLAN  INR assessment SUB    Recheck INR In: 9 DAYS    INR Location Clinic      Anticoagulation Summary  As of 2020    INR goal:  2.0-3.0   TTR:  48.2 % (11.5 mo)   INR used for dosin.90 (2020)   Warfarin maintenance plan:  2.5 mg (2.5 mg x 1) every Tue, Thu, Sat; 1.25 mg (2.5 mg x 0.5) all other days   Full warfarin instructions:  : 3.75 mg; Otherwise 2.5 mg every Tue, Thu, Sat; 1.25 mg all other days   Weekly warfarin total:  12.5 mg   Plan last modified:  Adeline Duke RN (2020)   Next INR check:  12/3/2020   Priority:  High   Target end date:  Indefinite    Indications    Chronic atrial fibrillation (H) [I48.20]  Long term current use of anticoagulant therapy [Z79.01]             Anticoagulation Episode Summary     INR check location:      Preferred lab:      [facility] [place]    Comments:  *       Anticoagulation Care Providers     Provider Role Specialty Phone number    Katie Martino MD Referring Family Medicine [phone]            See the Encounter Report to view Anticoagulation Flowsheet and Dosing Calendar (Go to Encounters tab in chart review, and find the Anticoagulation Therapy Visit)        Aparna Kunz RN

## 2020-12-01 ENCOUNTER — HOSPITAL ENCOUNTER (OUTPATIENT)
Dept: MRI IMAGING | Facility: CLINIC | Age: 75
Discharge: HOME | End: 2020-12-01
Attending: FAMILY MEDICINE | Admitting: FAMILY MEDICINE
Payer: COMMERCIAL

## 2020-12-01 DIAGNOSIS — M79.671 ACUTE FOOT PAIN, RIGHT: ICD-10-CM

## 2020-12-01 PROCEDURE — 73718 MRI LOWER EXTREMITY W/O DYE: CPT | Mod: RT

## 2020-12-01 PROCEDURE — 73718 MRI LOWER EXTREMITY W/O DYE: CPT | Mod: 26 | Performed by: RADIOLOGY

## 2020-12-03 ENCOUNTER — ANTICOAGULATION THERAPY VISIT (OUTPATIENT)
Dept: ANTICOAGULATION | Facility: CLINIC | Age: 75
End: 2020-12-03

## 2020-12-03 DIAGNOSIS — I48.20 CHRONIC ATRIAL FIBRILLATION (H): ICD-10-CM

## 2020-12-03 DIAGNOSIS — Z79.01 LONG TERM CURRENT USE OF ANTICOAGULANT THERAPY: ICD-10-CM

## 2020-12-03 LAB
CAPILLARY BLOOD COLLECTION: NORMAL
INR PPP: 2.1 (ref 0.86–1.14)

## 2020-12-03 PROCEDURE — 85610 PROTHROMBIN TIME: CPT | Performed by: FAMILY MEDICINE

## 2020-12-03 PROCEDURE — 36416 COLLJ CAPILLARY BLOOD SPEC: CPT | Performed by: FAMILY MEDICINE

## 2020-12-03 NOTE — PROGRESS NOTES
ANTICOAGULATION MANAGEMENT     Patient Name:  Antoine Russo  Date:  12/3/2020    ASSESSMENT /SUBJECTIVE:    Today's INR result of 2.10 is therapeutic. Goal INR of 2.0-3.0      Warfarin dose taken: Warfarin taken as instructed    Diet: No new diet changes affecting INR    Medication changes/ interactions: No new medications/supplements affecting INR    Previous INR: Subtherapeutic 1.90    S/S of bleeding or thromboembolism: No    New injury or illness: No    Upcoming surgery, procedure or cardioversion: No    Additional findings: None      PLAN:    Telephone call with Antoine regarding INR result and instructed:     Warfarin Dosing Instructions: Continue your current warfarin dose 2.5mg Tues/Thurs/Sat; 1.25mg all other days    Instructed patient to follow up no later than: 2 weeks  Lab visit scheduled    Education provided: Contact the anticoagulation clinic with any changes, questions or concerns at #[phone]       Poli verbalizes understanding and agrees to warfarin dosing plan.    Instructed to call the Anticoagulation Clinic for any changes, questions or concerns. (#[phone])        Adeline Duke RN CACP       OBJECTIVE:  Recent labs: (last 7 days)     20  0849   INR 2.10*         INR assessment THER    Recheck INR In: 2 WEEKS    INR Location Clinic      Anticoagulation Summary  As of 12/3/2020    INR goal:  2.0-3.0   TTR:  46.9 % (11.5 mo)   INR used for dosin.10 (12/3/2020)   Warfarin maintenance plan:  2.5 mg (2.5 mg x 1) every Tue, Thu, Sat; 1.25 mg (2.5 mg x 0.5) all other days   Full warfarin instructions:  2.5 mg every Tue, Thu, Sat; 1.25 mg all other days   Weekly warfarin total:  12.5 mg   No change documented:  Adeline Duke RN   Plan last modified:  Adeline Duke RN (2020)   Next INR check:  2020   Priority:  High   Target end date:  Indefinite    Indications    Chronic atrial fibrillation (H) [I48.20]  Long term current use of anticoagulant  therapy [Z79.01]             Anticoagulation Episode Summary     INR check location:      Preferred lab:      Send INR reminders to:  [facility]    Comments:  *       Anticoagulation Care Providers     Provider Role Specialty Phone number    Katie Martino MD Referring Family Medicine [phone]

## 2020-12-04 ENCOUNTER — OFFICE VISIT (OUTPATIENT)
Dept: PODIATRY | Facility: CLINIC | Age: 75
End: 2020-12-04
Payer: COMMERCIAL

## 2020-12-04 ENCOUNTER — TELEPHONE (OUTPATIENT)
Dept: FAMILY MEDICINE | Facility: CLINIC | Age: 75
End: 2020-12-04

## 2020-12-04 VITALS
HEART RATE: 74 BPM | HEIGHT: 69 IN | BODY MASS INDEX: 29.62 KG/M2 | DIASTOLIC BLOOD PRESSURE: 70 MMHG | SYSTOLIC BLOOD PRESSURE: 151 MMHG | WEIGHT: 200 LBS

## 2020-12-04 DIAGNOSIS — M84.374A STRESS FRACTURE OF METATARSAL BONE OF RIGHT FOOT, INITIAL ENCOUNTER: ICD-10-CM

## 2020-12-04 DIAGNOSIS — M79.671 ACUTE FOOT PAIN, RIGHT: ICD-10-CM

## 2020-12-04 PROCEDURE — 99203 OFFICE O/P NEW LOW 30 MIN: CPT | Performed by: PODIATRIST

## 2020-12-04 RX ORDER — METHYLPREDNISOLONE 4 MG
4 TABLET, DOSE PACK ORAL SEE ADMIN INSTRUCTIONS
Qty: 21 TABLET | Refills: 0 | Status: SHIPPED | OUTPATIENT
Start: 2020-12-04 | End: 2021-02-16

## 2020-12-04 ASSESSMENT — MIFFLIN-ST. JEOR: SCORE: 1642.8

## 2020-12-04 ASSESSMENT — PAIN SCALES - GENERAL: PAINLEVEL: MODERATE PAIN (5)

## 2020-12-04 NOTE — LETTER
12/4/2020         RE: Antoine Russo  [address] [address]        Dear Colleague,    Thank you for referring your patient, Antoine Russo, to the [facility] [place]. Please see a copy of my visit note below.    PATIENT HISTORY:  Antoine Russo is a 74 year old male who presents to clinic with a chief complaint of pain in the arch of the right foot.  The patient relates that the problem has been going on for several weeks and is getting worse.  The patient relates trying different shoes  with little relief.  The patient was referred by Dr. Gross for consultation on the right foot.  Previous notes and studies pertinent to the patient's condition were reviewed.      REVIEW OF SYSTEMS:  Constitutional, HEENT, cardiovascular, pulmonary, GI, , musculoskeletal, neuro, skin, endocrine and psych systems are negative, except as otherwise noted.     PAST MEDICAL HISTORY:   Past Medical History:   Diagnosis Date     Acute kidney injury (H) 4/17/2019     Acute on chronic pancreatitis (H) 1/23/2018     Bacteriuria with pyuria 4/17/2019     C. difficile colitis      Colon polyp      Colon polyp 8/26/2011    Colonoscopy 8/2011-A sessile polyp was found in the cecum. The polyp was 6 mm in size. The polyp was removed with a hot snare. Resection and retrieval were complete. A sessile polyp was found in the proximal transverse colon. The polyp was 15 mm in size. The polyp was removed with a hot snare. Resection and retrieval were complete. A sessile polyp was found in the sigmoid colon. The polyp was 5 mm     Coronary artery disease      Diabetes mellitus (H)     type 2     Diverticulitis      Diverticulitis of colon 7/17/2007    Colitis on CT Scan 5/2011- [place] [facility] 8/2011 Diverticulitis - Multiple small and large-mouthed diverticula were found in the mid sigmoid colon and at the hepatic flexure. There was narrowing of the colon in association with the diverticular  opening. Nena-diverticular erythema was seen. There was evidence of an impacted diverticulum. 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     Noninfectious ileitis      Recurrent pancreatitis         PAST SURGICAL HISTORY:   Past Surgical History:   Procedure Laterality Date     BREAST SURGERY  2008    right breast mass benign     COLONOSCOPY      multiple polyps removed     COLONOSCOPY  8/24/2011    Procedure:COMBINED COLONOSCOPY, REMOVE TUMOR/POLYP/LESION BY SNARE; Surgeon:MILEY ARBOLEDA; Location:[facility]     COLONOSCOPY  12/17/2012    Procedure: COLONOSCOPY;;  Surgeon: Leon Maurer MD;  Location: [facility]     COLONOSCOPY  12/18/2012    Procedure: COLONOSCOPY;;  Surgeon: Leon Maurer MD;  [facility]     DENERVATION OF SPERMATIC CORD MICROSURGICAL Left 5/23/2017    Procedure: DENERVATION OF SPERMATIC CORD MICROSURGICAL;;  Surgeon: Marcio Aggarwal MD;  [facility]     DISSECTION RADICAL NECK BILATERAL  8/2/2012    Procedure: DISSECTION RADICAL NECK BILATERAL;;  Surgeon: Yung Alvares MD;  [facility]     ENDOSCOPIC RETROGRADE CHOLANGIOPANCREATOGRAM N/A 5/10/2016    Procedure: COMBINED ENDOSCOPIC RETROGRADE CHOLANGIOPANCREATOGRAPHY, PLACE TUBE/STENT;  Surgeon: Yovanny Beasley MD;  [facility]     ENDOSCOPIC RETROGRADE CHOLANGIOPANCREATOGRAM N/A 3/29/2018    Procedure: ENDOSCOPIC RETROGRADE CHOLANGIOPANCREATOGRAM;  Endoscopic Retrograde Cholangiopancreatogram, Endoscopic Ultrasound, Biliary Sphincterotomy, Biliary and Pancreatic Stent Placement;  Surgeon: Yovanny Beasley MD;  [facility]     ENDOSCOPIC ULTRASOUND UPPER GASTROINTESTINAL TRACT (GI) N/A 2/3/2016    Procedure: ENDOSCOPIC ULTRASOUND, ESOPHAGOSCOPY / UPPER GASTROINTESTINAL TRACT (GI);  Surgeon: Grabiel Plata MD;  [facility]     ENDOSCOPIC ULTRASOUND UPPER  GASTROINTESTINAL TRACT (GI) N/A 3/29/2018    Procedure: ENDOSCOPIC ULTRASOUND, ESOPHAGOSCOPY / UPPER GASTROINTESTINAL TRACT (GI);;  Surgeon: Grabiel Plata MD;  [facility]     ESOPHAGOSCOPY, GASTROSCOPY, DUODENOSCOPY (EGD), COMBINED N/A 2/3/2016    Procedure: COMBINED ENDOSCOPIC ULTRASOUND, ESOPHAGOSCOPY, GASTROSCOPY, DUODENOSCOPY (EGD), FINE NEEDLE ASPIRATE/BIOPSY;  Surgeon: Grabiel Plata MD;  [facility]     ESOPHAGOSCOPY, GASTROSCOPY, DUODENOSCOPY (EGD), COMBINED N/A 6/8/2016    Procedure: COMBINED ESOPHAGOSCOPY, GASTROSCOPY, DUODENOSCOPY (EGD), REMOVE FOREIGN BODY;  Surgeon: Yovanny Beasley MD;  [facility]     ESOPHAGOSCOPY, GASTROSCOPY, DUODENOSCOPY (EGD), COMBINED N/A 4/17/2018    Procedure: COMBINED ESOPHAGOSCOPY, GASTROSCOPY, DUODENOSCOPY (EGD), REMOVE FOREIGN BODY;  EGD with stent removal;  Surgeon: Grabiel Plata MD;  [facility]     EXCISE LESION INTRAORAL  6/14/2012    Procedure: EXCISE LESION INTRAORAL;  Wide Local Excision Floor of Mouth, Direct Laryngoscopy, Bilateral [place]'s Marsuplization, Split Thickness Skin Graft from right Thigh  Latex Safe;  Surgeon: Gerson Ravi MD;  [facility]     EXCISE LESION INTRAORAL  8/2/2012    Procedure: EXCISE LESION INTRAORAL;  Floor of Mouth Resection, Bilateral Selective Radical Neck Dissection, Tracheostomy, Left Radial Forearm  Free Flap with Alloderm, Nasogastric Feeding Tube Placement,    * Latex Safe*;  Surgeon: Gerson Ravi MD;  [facility]     EXCISE LESION INTRAORAL  12/11/2012    Procedure: EXCISE LESION INTRAORAL;  takedown of oral flap;  Surgeon: Yung Alvares MD;  [facility]     GRAFT FREE VASCULARIZED (LOCATION)  8/2/2012    Procedure: GRAFT FREE VASCULARIZED (LOCATION);;  Surgeon: Yung Alvares MD;  [facility]     GRAFT SKIN SPLIT THICKNESS FROM EXTREMITY  6/14/2012    Procedure: GRAFT SKIN SPLIT THICKNESS FROM EXTREMITY;;  Surgeon: Gerson Ravi MD;  [facility]      LAPAROSCOPIC ILEOSTOMY TAKEDOWN  6/6/2013    Procedure: LAPAROSCOPIC ILEOSTOMY TAKEDOWN;  Laparoscopic Closure of Enterostomy, Guerda's Type with IleoRectal Anastomosis ;  Surgeon: Grabiel Riddle MD;  [facility]     LAPAROTOMY EXPLORATORY  12/20/2012    Procedure: LAPAROTOMY EXPLORATORY;  Exploratory Laparotomy, total abdominal colectomy, ileostomy formation;  Surgeon: Miquel Cannon MD;  [facility]     LARYNGOSCOPY  6/14/2012    Procedure: LARYNGOSCOPY;;  Surgeon: Gerson Ravi MD;  [facility]     ORTHOPEDIC SURGERY      ganglian cyst left ankle     PANCREATECTOMY, SPLENECTOMY N/A 3/10/2016    Procedure: PANCREATECTOMY, SPLENECTOMY;  Surgeon: Nael Abel MD;  [facility]     SHOULDER SURGERY  2006, 2008    2006- right rotator cuff, 2008 bone spur on left. Dr. Hdez     VARICOCELECTOMY Left 5/23/2017    Procedure: VARICOCELECTOMY;  Left Varicocele Repair, Denervation of Left Testis;  Surgeon: Marcio Aggarwal MD;  [facility]        MEDICATIONS:   Current Outpatient Medications:      Continuous Blood Gluc  (FREESTYLE LISA 14 DAY READER) JOSE ALBERTO, 1 each as needed (use to scan blood sugars from sensor), Disp: 1 Device, Rfl: 0     Continuous Blood Gluc Sensor (FREESTYLE LISA 14 DAY SENSOR) MISC, 1 each every 14 days, Disp: 2 each, Rfl: 11     insulin isophane human (NOVOLIN N FLEXPEN RELION) 100 UNIT/ML injection, Take 34 units of N in the am and 32 units in the pm., Disp: 30 mL, Rfl: 5     insulin pen needle (BD LUIS U/F) 32G X 4 MM miscellaneous, USE PEN NEEDLE ONCE DAILY AS DIRECTED, Disp: 60 each, Rfl: 0     lisinopril (PRINIVIL/ZESTRIL) 5 MG tablet, Take 1 tablet (5 mg) by mouth daily, Disp: 90 tablet, Rfl: 3     methylPREDNISolone (MEDROL DOSEPAK) 4 MG tablet therapy pack, Take 1 tablet (4 mg) by mouth See Admin Instructions Tapered dose, Disp: 21 tablet, Rfl: 0     metoprolol succinate ER (TOPROL-XL) 50 MG 24 hr tablet, Take 1.5 tablets (75 mg) by mouth 2 times  daily, Disp: 135 tablet, Rfl: 3     multivitamin, therapeutic with minerals (THERA-VIT-M) TABS, Take 1 tablet by mouth daily., Disp: 30 each, Rfl: 1     pantoprazole (PROTONIX) 40 MG EC tablet, Take 1 tablet (40 mg) by mouth daily, Disp: 30 tablet, Rfl: 1     pregabalin (LYRICA) 75 MG capsule, Take 1 capsule (75 mg) by mouth 2 times daily, Disp: 60 capsule, Rfl: 5     rosuvastatin (CRESTOR) 10 MG tablet, Take 1 tablet (10 mg) by mouth daily, Disp: 90 tablet, Rfl: 3     tamsulosin (FLOMAX) 0.4 MG capsule, Take 1 capsule (0.4 mg) by mouth daily, Disp: 90 capsule, Rfl: 3     vitamin B-12 (CYANOCOBALAMIN) 100 MCG tablet, Take 100 mcg by mouth daily, Disp: , Rfl:      warfarin ANTICOAGULANT (COUMADIN) 2.5 MG tablet, Take 2.5mg (1 tab) Tu/Thurs/Sat; 1.25mg (1/2 tab) all other days or as directed by the Anticoagulation Clinic, Disp: 70 tablet, Rfl: 0     ALLERGIES:    Allergies   Allergen Reactions     Nkda [No Known Drug Allergies]         SOCIAL HISTORY:   Social History     Socioeconomic History     Marital status:      Spouse name: Lily     Number of children: Not on file     Years of education: Not on file     Highest education level: Not on file   Occupational History     Occupation: [employer]     Comment: 20 hr/week monday-Thur 7-noon     Occupation: [place]      Employer: [employer]     Occupation: RETIRED   Social Needs     Financial resource strain: Not hard at all     Food insecurity     Worry: Never true     Inability: Never true     Transportation needs     Medical: No     Non-medical: No   Tobacco Use     Smoking status: Former Smoker     Packs/day: 1.00     Years: 40.00     Pack years: 40.00     Types: Cigarettes     Quit date: 2006     Years since quittin.0     Smokeless tobacco: Never Used   Substance and Sexual Activity     Alcohol use: Yes     Frequency: 4 or more times a week     Drinks per session: 3 or 4     Binge frequency: Weekly     Drug  "use: Not on file     Sexual activity: Yes     Partners: Female   Lifestyle     Physical activity     Days per week: Not on file     Minutes per session: Not on file     Stress: Not on file   Relationships     Social connections     Talks on phone: Not on file     Gets together: Not on file     Attends Rastafari service: Not on file     Active member of club or organization: Not on file     Attends meetings of clubs or organizations: Not on file     Relationship status: Not on file     Intimate partner violence     Fear of current or ex partner: Not on file     Emotionally abused: Not on file     Physically abused: Not on file     Forced sexual activity: Not on file   Other Topics Concern      Service Yes     Comment: [employer] 6 years     Blood Transfusions No     Caffeine Concern Yes     Comment: 0-2 cups     Occupational Exposure No     Comment: retired     Hobby Hazards No     Sleep Concern No     Stress Concern No     Weight Concern No     Special Diet Yes     Comment: healthy     Back Care No     Exercise Yes     Bike Helmet Not Asked     Comment: N/A     Seat Belt Yes     Self-Exams Yes     Parent/sibling w/ CABG, MI or angioplasty before 65F 55M? No   Social History Narrative    Son lives in [place] with 2 grandchildren 19 and 6        FAMILY HISTORY:   Family History   Problem Relation Age of Onset     Diabetes Sister         onset age 50     Alzheimer Disease Mother          80     Alzheimer Disease Father          85     Diabetes Other         nephew type 1     Diabetes Other      Aneurysm Sister      Anesthesia Reaction No family hx of      Colon Cancer No family hx of      Colon Polyps No family hx of      Crohn's Disease No family hx of      Ulcerative Colitis No family hx of         EXAM:Vitals: BP (!) 151/70   Pulse 74   Ht 1.745 m (5' 8.7\")   Wt 90.7 kg (200 lb)   BMI 29.79 kg/m              General appearance: Patient is alert and fully cooperative with history & exam.  No sign " of distress is noted during the visit.     Psychiatric: Affect is pleasant & appropriate.  Patient appears motivated to improve health.     Respiratory: Breathing is regular & unlabored while sitting.     HEENT: Hearing is intact to spoken word.  Speech is clear.  No gross evidence of visual impairment that would impact ambulation.     Dermatologic: Skin is intact to right lower extremities without significant lesions, rash or abrasion.        Vascular: DP & PT pulses are intact & regular on the right.   CFT and skin temperature is normal to the right lower extremities.     Neurologic: Lower extremity sensation is intact to light touch.  No evidence of weakness in the right lower extremities.        Musculoskeletal: Patient is ambulatory without assistive device or brace.  No gross ankle deformity noted.  No foot or ankle joint effusion is noted.  Noted pain on palpation at the base of the second metatarsal on the right.  Diffuse edema noted over the arch and forefoot on the right.  No ecchymosis noted.    Radiographs were evaluated including weightbearing AP, lateral and medial oblique views of the right foot reveals  no cortical erosions or periosteal elevation.  All joint margins appear stable.  There is no apparent fracture or tumor formation noted.  There is no evidence of foreign body.    MRI of the right foot reveals increased signal intensity on the STIR image involving the base of the second metatarsal.  No disruption of the Lisfranc ligament noted.  No degenerative changes noted to the second tarsometatarsal joint.    ASSESSMENT / PLAN:     ICD-10-CM    1. Acute foot pain, right  M79.671 [facility]opedic [facility] Spine  Referral   2. Stress fracture of metatarsal bone of right foot, initial encounter  M84.374A Orthopedic [facility] Spine  Referral     Ankle/Foot Bracing Supplies DME     methylPREDNISolone (MEDROL DOSEPAK) 4 MG tablet therapy pack       I have explained to Antoine  about the conditions.  We  discussed the underlying contributing factors of the condition as well as the treatment plan and expected length of recovery.  At this time, the patient was instructed on icing, stretching, tissue massage and support.  The patient was fitted with a cam boot that will aid in offloading the tension forces to the soft tissues and prevent further inflammation.  To reduce the amount of current inflammation, the patient was prescribed a Medrol Dosepak to be taken daily with food and instructed to stop taking if any stomach irritation or swelling in extremities are noted.  The patient will return in four weeks for reevaluation if the symptoms do not resolve.      Antoine verbalized agreement with and understanding of the rational for the diagnosis and treatment plan.  All questions were answered to best of my ability and the patient's satisfaction. The patient was advised to contact the clinic with any questions that may arise after the clinic visit.      Disclaimer: This note consists of symbols derived from keyboarding, dictation and/or voice recognition software. As a result, there may be errors in the script that have gone undetected. Please consider this when interpreting information found in this chart.       ERNESTINA Zuniga.P.DANIELLE., F.A.C.F.A.S.          Again, thank you for allowing me to participate in the care of your patient.        Sincerely,        Baldo Ventura DPM

## 2020-12-04 NOTE — PROGRESS NOTES
PATIENT HISTORY:  Antoine Russo is a 74 year old male who presents to clinic with a chief complaint of pain in the arch of the right foot.  The patient relates that the problem has been going on for several weeks and is getting worse.  The patient relates trying different shoes  with little relief.  The patient was referred by Dr. Gross for consultation on the right foot.  Previous notes and studies pertinent to the patient's condition were reviewed.      REVIEW OF SYSTEMS:  Constitutional, HEENT, cardiovascular, pulmonary, GI, , musculoskeletal, neuro, skin, endocrine and psych systems are negative, except as otherwise noted.     PAST MEDICAL HISTORY:   Past Medical History:   Diagnosis Date     Acute kidney injury (H) 4/17/2019     Acute on chronic pancreatitis (H) 1/23/2018     Bacteriuria with pyuria 4/17/2019     C. difficile colitis      Colon polyp      Colon polyp 8/26/2011    Colonoscopy 8/2011-A sessile polyp was found in the cecum. The polyp was 6 mm in size. The polyp was removed with a hot snare. Resection and retrieval were complete. A sessile polyp was found in the proximal transverse colon. The polyp was 15 mm in size. The polyp was removed with a hot snare. Resection and retrieval were complete. A sessile polyp was found in the sigmoid colon. The polyp was 5 mm     Coronary artery disease      Diabetes mellitus (H)     type 2     Diverticulitis      Diverticulitis of colon 7/17/2007    Colitis on CT Scan 5/2011- [facility] 8/2011 Diverticulitis - Multiple small and large-mouthed diverticula were found in the mid sigmoid colon and at the hepatic flexure. There was narrowing of the colon in association with the diverticular opening. Nena-diverticular erythema was seen. There was evidence of an impacted diverticulum. 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      Noninfectious ileitis      Recurrent pancreatitis         PAST SURGICAL HISTORY:   Past Surgical History:   Procedure Laterality Date     BREAST SURGERY  2008    right breast mass benign     COLONOSCOPY      multiple polyps removed     COLONOSCOPY  8/24/2011    Procedure:COMBINED COLONOSCOPY, REMOVE TUMOR/POLYP/LESION BY SNARE; Surgeon:MILEY ARBOLEDA; Location:[facility]     COLONOSCOPY  12/17/2012    Procedure: COLONOSCOPY;;  Surgeon: Leon Maurer MD;  [facility]     COLONOSCOPY  12/18/2012    Procedure: COLONOSCOPY;;  Surgeon: Leon Maurer MD;  [facility]     DENERVATION OF SPERMATIC CORD MICROSURGICAL Left 5/23/2017    Procedure: DENERVATION OF SPERMATIC CORD MICROSURGICAL;;  Surgeon: Marcio Aggarwal MD;  [facility]     DISSECTION RADICAL NECK BILATERAL  8/2/2012    Procedure: DISSECTION RADICAL NECK BILATERAL;;  Surgeon: Yung Alvares MD;  [facility]     ENDOSCOPIC RETROGRADE CHOLANGIOPANCREATOGRAM N/A 5/10/2016    Procedure: COMBINED ENDOSCOPIC RETROGRADE CHOLANGIOPANCREATOGRAPHY, PLACE TUBE/STENT;  Surgeon: Yovanny Beasley MD;  [facility]     ENDOSCOPIC RETROGRADE CHOLANGIOPANCREATOGRAM N/A 3/29/2018    Procedure: ENDOSCOPIC RETROGRADE CHOLANGIOPANCREATOGRAM;  Endoscopic Retrograde Cholangiopancreatogram, Endoscopic Ultrasound, Biliary Sphincterotomy, Biliary and Pancreatic Stent Placement;  Surgeon: Yovanny Beasley MD;  [facility]     ENDOSCOPIC ULTRASOUND UPPER GASTROINTESTINAL TRACT (GI) N/A 2/3/2016    Procedure: ENDOSCOPIC ULTRASOUND, ESOPHAGOSCOPY / UPPER GASTROINTESTINAL TRACT (GI);  Surgeon: Grabiel Plata MD;  [facility]     ENDOSCOPIC ULTRASOUND UPPER GASTROINTESTINAL TRACT (GI) N/A 3/29/2018    Procedure: ENDOSCOPIC ULTRASOUND, ESOPHAGOSCOPY / UPPER GASTROINTESTINAL TRACT (GI);;  Surgeon: Grabiel Plata MD;  [facility]     ESOPHAGOSCOPY, GASTROSCOPY, DUODENOSCOPY (EGD), COMBINED N/A 2/3/2016    Procedure:  COMBINED ENDOSCOPIC ULTRASOUND, ESOPHAGOSCOPY, GASTROSCOPY, DUODENOSCOPY (EGD), FINE NEEDLE ASPIRATE/BIOPSY;  Surgeon: Grabiel Plata MD;  [facility]     ESOPHAGOSCOPY, GASTROSCOPY, DUODENOSCOPY (EGD), COMBINED N/A 6/8/2016    Procedure: COMBINED ESOPHAGOSCOPY, GASTROSCOPY, DUODENOSCOPY (EGD), REMOVE FOREIGN BODY;  Surgeon: Yovanny Beasley MD;  [facility]     ESOPHAGOSCOPY, GASTROSCOPY, DUODENOSCOPY (EGD), COMBINED N/A 4/17/2018    Procedure: COMBINED ESOPHAGOSCOPY, GASTROSCOPY, DUODENOSCOPY (EGD), REMOVE FOREIGN BODY;  EGD with stent removal;  Surgeon: Grabiel Plata MD;  [facility]     EXCISE LESION INTRAORAL  6/14/2012    Procedure: EXCISE LESION INTRAORAL;  Wide Local Excision Floor of Mouth, Direct Laryngoscopy, Bilateral Stonewall's Marsuplization, Split Thickness Skin Graft from right Thigh  Latex Safe;  Surgeon: Gerson Ravi MD;  [facility]     EXCISE LESION INTRAORAL  8/2/2012    Procedure: EXCISE LESION INTRAORAL;  Floor of Mouth Resection, Bilateral Selective Radical Neck Dissection, Tracheostomy, Left Radial Forearm  Free Flap with Alloderm, Nasogastric Feeding Tube Placement,    * Latex Safe*;  Surgeon: Gerson Ravi MD;  [facility]     EXCISE LESION INTRAORAL  12/11/2012    Procedure: EXCISE LESION INTRAORAL;  takedown of oral flap;  Surgeon: Yung Alvares MD;  [facility]     GRAFT FREE VASCULARIZED (LOCATION)  8/2/2012    Procedure: GRAFT FREE VASCULARIZED (LOCATION);;  Surgeon: Yung Alvares MD;  [facility]     GRAFT SKIN SPLIT THICKNESS FROM EXTREMITY  6/14/2012    Procedure: GRAFT SKIN SPLIT THICKNESS FROM EXTREMITY;;  Surgeon: Gerson Ravi MD;  [facility]     LAPAROSCOPIC ILEOSTOMY TAKEDOWN  6/6/2013    Procedure: LAPAROSCOPIC ILEOSTOMY TAKEDOWN;  Laparoscopic Closure of Enterostomy, Guerda's Type with IleoRectal Anastomosis ;  Surgeon: Grabiel Riddle MD;  [facility]     LAPAROTOMY EXPLORATORY  12/20/2012     Procedure: LAPAROTOMY EXPLORATORY;  Exploratory Laparotomy, total abdominal colectomy, ileostomy formation;  Surgeon: Miquel Cannon MD;  [facility]     LARYNGOSCOPY  6/14/2012    Procedure: LARYNGOSCOPY;;  Surgeon: Gerson Ravi MD;  [facility]     ORTHOPEDIC SURGERY      ganglian cyst left ankle     PANCREATECTOMY, SPLENECTOMY N/A 3/10/2016    Procedure: PANCREATECTOMY, SPLENECTOMY;  Surgeon: Nael Abel MD;  [facility]     SHOULDER SURGERY  2006, 2008    2006- right rotator cuff, 2008 bone spur on left. Dr. Hdez     VARICOCELECTOMY Left 5/23/2017    Procedure: VARICOCELECTOMY;  Left Varicocele Repair, Denervation of Left Testis;  Surgeon: Marcio Aggarwal MD;  Location:  OR        MEDICATIONS:   Current Outpatient Medications:      Continuous Blood Gluc  (FREESTYLE LISA 14 DAY READER) JOSE ALBERTO, 1 each as needed (use to scan blood sugars from sensor), Disp: 1 Device, Rfl: 0     Continuous Blood Gluc Sensor (FREESTYLE LISA 14 DAY SENSOR) MISC, 1 each every 14 days, Disp: 2 each, Rfl: 11     insulin isophane human (NOVOLIN N FLEXPEN RELION) 100 UNIT/ML injection, Take 34 units of N in the am and 32 units in the pm., Disp: 30 mL, Rfl: 5     insulin pen needle (BD LUIS U/F) 32G X 4 MM miscellaneous, USE PEN NEEDLE ONCE DAILY AS DIRECTED, Disp: 60 each, Rfl: 0     lisinopril (PRINIVIL/ZESTRIL) 5 MG tablet, Take 1 tablet (5 mg) by mouth daily, Disp: 90 tablet, Rfl: 3     methylPREDNISolone (MEDROL DOSEPAK) 4 MG tablet therapy pack, Take 1 tablet (4 mg) by mouth See Admin Instructions Tapered dose, Disp: 21 tablet, Rfl: 0     metoprolol succinate ER (TOPROL-XL) 50 MG 24 hr tablet, Take 1.5 tablets (75 mg) by mouth 2 times daily, Disp: 135 tablet, Rfl: 3     multivitamin, therapeutic with minerals (THERA-VIT-M) TABS, Take 1 tablet by mouth daily., Disp: 30 each, Rfl: 1     pantoprazole (PROTONIX) 40 MG EC tablet, Take 1 tablet (40 mg) by mouth daily, Disp: 30 tablet, Rfl: 1     pregabalin  (LYRICA) 75 MG capsule, Take 1 capsule (75 mg) by mouth 2 times daily, Disp: 60 capsule, Rfl: 5     rosuvastatin (CRESTOR) 10 MG tablet, Take 1 tablet (10 mg) by mouth daily, Disp: 90 tablet, Rfl: 3     tamsulosin (FLOMAX) 0.4 MG capsule, Take 1 capsule (0.4 mg) by mouth daily, Disp: 90 capsule, Rfl: 3     vitamin B-12 (CYANOCOBALAMIN) 100 MCG tablet, Take 100 mcg by mouth daily, Disp: , Rfl:      warfarin ANTICOAGULANT (COUMADIN) 2.5 MG tablet, Take 2.5mg (1 tab) Tu/Thurs/Sat; 1.25mg (1/2 tab) all other days or as directed by the Anticoagulation Clinic, Disp: 70 tablet, Rfl: 0     ALLERGIES:    Allergies   Allergen Reactions     Nkda [No Known Drug Allergies]         SOCIAL HISTORY:   Social History     Socioeconomic History     Marital status:      Spouse name: Lily     Number of children: Not on file     Years of education: Not on file     Highest education level: Not on file   Occupational History     Occupation: [employer]     Comment: 20 hr/week monday-Thur 7-noon     Occupation: [employer]      Employer: [employer]     Occupation: RETIRED   Social Needs     Financial resource strain: Not hard at all     Food insecurity     Worry: Never true     Inability: Never true     Transportation needs     Medical: No     Non-medical: No   Tobacco Use     Smoking status: Former Smoker     Packs/day: 1.00     Years: 40.00     Pack years: 40.00     Types: Cigarettes     Quit date: 2006     Years since quittin.0     Smokeless tobacco: Never Used   Substance and Sexual Activity     Alcohol use: Yes     Frequency: 4 or more times a week     Drinks per session: 3 or 4     Binge frequency: Weekly     Drug use: Not on file     Sexual activity: Yes     Partners: Female   Lifestyle     Physical activity     Days per week: Not on file     Minutes per session: Not on file     Stress: Not on file   Relationships     Social connections     Talks on phone: Not on file     Gets  "together: Not on file     Attends Yazidism service: Not on file     Active member of club or organization: Not on file     Attends meetings of clubs or organizations: Not on file     Relationship status: Not on file     Intimate partner violence     Fear of current or ex partner: Not on file     Emotionally abused: Not on file     Physically abused: Not on file     Forced sexual activity: Not on file   Other Topics Concern      Service Yes     Comment: [employer] 6 years     Blood Transfusions No     Caffeine Concern Yes     Comment: 0-2 cups     Occupational Exposure No     Comment: retired     Hobby Hazards No     Sleep Concern No     Stress Concern No     Weight Concern No     Special Diet Yes     Comment: healthy     Back Care No     Exercise Yes     Bike Helmet Not Asked     Comment: N/A     Seat Belt Yes     Self-Exams Yes     Parent/sibling w/ CABG, MI or angioplasty before 65F 55M? No   Social History Narrative    Son lives in [place] with 2 grandchildren 19 and 6        FAMILY HISTORY:   Family History   Problem Relation Age of Onset     Diabetes Sister         onset age 50     Alzheimer Disease Mother          80     Alzheimer Disease Father          85     Diabetes Other         nephew type 1     Diabetes Other      Aneurysm Sister      Anesthesia Reaction No family hx of      Colon Cancer No family hx of      Colon Polyps No family hx of      Crohn's Disease No family hx of      Ulcerative Colitis No family hx of         EXAM:Vitals: BP (!) 151/70   Pulse 74   Ht 1.745 m (5' 8.7\")   Wt 90.7 kg (200 lb)   BMI 29.79 kg/m              General appearance: Patient is alert and fully cooperative with history & exam.  No sign of distress is noted during the visit.     Psychiatric: Affect is pleasant & appropriate.  Patient appears motivated to improve health.     Respiratory: Breathing is regular & unlabored while sitting.     HEENT: Hearing is intact to spoken word.  Speech is clear.  No " gross evidence of visual impairment that would impact ambulation.     Dermatologic: Skin is intact to right lower extremities without significant lesions, rash or abrasion.        Vascular: DP & PT pulses are intact & regular on the right.   CFT and skin temperature is normal to the right lower extremities.     Neurologic: Lower extremity sensation is intact to light touch.  No evidence of weakness in the right lower extremities.        Musculoskeletal: Patient is ambulatory without assistive device or brace.  No gross ankle deformity noted.  No foot or ankle joint effusion is noted.  Noted pain on palpation at the base of the second metatarsal on the right.  Diffuse edema noted over the arch and forefoot on the right.  No ecchymosis noted.    Radiographs were evaluated including weightbearing AP, lateral and medial oblique views of the right foot reveals  no cortical erosions or periosteal elevation.  All joint margins appear stable.  There is no apparent fracture or tumor formation noted.  There is no evidence of foreign body.    MRI of the right foot reveals increased signal intensity on the STIR image involving the base of the second metatarsal.  No disruption of the Lisfranc ligament noted.  No degenerative changes noted to the second tarsometatarsal joint.    ASSESSMENT / PLAN:     ICD-10-CM    1. Acute foot pain, right  M79.671 Orthopedic [facility] Sp[facility]  Referral   2. Stress fracture of metatarsal bone of right foot, initial encounter  M84.374A Orthopedic [facility] Spine  Referral     Ankle/Foot Bracing Supplies DME     methylPREDNISolone (MEDROL DOSEPAK) 4 MG tablet therapy pack       I have explained to Antoine  about the conditions.  We discussed the underlying contributing factors of the condition as well as the treatment plan and expected length of recovery.  At this time, the patient was instructed on icing, stretching, tissue massage and support.  The patient was fitted with a cam boot that will aid  in offloading the tension forces to the soft tissues and prevent further inflammation.  To reduce the amount of current inflammation, the patient was prescribed a Medrol Dosepak to be taken daily with food and instructed to stop taking if any stomach irritation or swelling in extremities are noted.  The patient will return in four weeks for reevaluation if the symptoms do not resolve.      Antoine verbalized agreement with and understanding of the rational for the diagnosis and treatment plan.  All questions were answered to best of my ability and the patient's satisfaction. The patient was advised to contact the clinic with any questions that may arise after the clinic visit.      Disclaimer: This note consists of symbols derived from keyboarding, dictation and/or voice recognition software. As a result, there may be errors in the script that have gone undetected. Please consider this when interpreting information found in this chart.       NILTON Ventura D.P.M., F.SUHA.C.F.A.S.

## 2020-12-04 NOTE — TELEPHONE ENCOUNTER
[facility] Treated diabetes Mellitus report  Form received back signed  12/4/20  Faxed Back & Sent to be scanned to this encounter  Janie Orn [facility] Sec

## 2020-12-04 NOTE — NURSING NOTE
"Chief Complaint   Patient presents with     Fracture     Stress fracture of the metatarsal bone, right foot, MRI 12/01, XR on 11/24       Initial BP (!) 151/70   Pulse 74   Ht 1.745 m (5' 8.7\")   Wt 90.7 kg (200 lb)   BMI 29.79 kg/m   Estimated body mass index is 29.79 kg/m  as calculated from the following:    Height as of this encounter: 1.745 m (5' 8.7\").    Weight as of this encounter: 90.7 kg (200 lb).  Medications and allergies reviewed.      Doris DO MA    "

## 2020-12-04 NOTE — PATIENT INSTRUCTIONS
Poli or Saleem to follow up with Primary Care provider regarding elevated blood pressure.    Next Steps:      1. Support:  a. Wear supportive shoes, sandals, boots and/or inserts that have a rigid supportive sole.    i. This will offload the majority of tension forces that travel through your feet every step you take.    b. It is important that you also wear supportive shoe wear in the house to continue providing support to your feet.    c. You may always use a cushioned liner for your shoes if that makes your feet feel better.  2. Stretching  a. Calf stretching is essential to offload the tension forces that travel through your feet every step you take  b. Preferred calf stretch is the Runner's Stretch  i. Place one foot behind the other foot, flat against the ground (it is important to keep the heel on the ground).  The back leg is the one that will be stretched.  1. Start with the knee straight and lean your hips into the wall, counter or whatever you are leaning into - count to ten.  2. Next, bend the knee.  You should feel the stretch lower in the calf muscle - count to ten.  c. Repeat this stretch once an hour to start off with.  When symptoms subside, I recommend performing the stretch 3 times daily to prevent any future problems.  3. Tissue Massage  a. It is important that you physically loosen the inflammation tissue to help your body heal the injured tissue.  b. I recommend soaking your foot in warm water to increase the microcirculation to the soft tissues.  You may add Epson salt to the water if you prefer.  c. You may apply an over-the-counter muscle rub, such as Voltaren or Aspercreme, and deeply massage the injured tissue.  4. Reduce Inflammation  a. You can ice the injured tissue with an ice pack with a light cloth covering or soaking in ice water 20 minutes to reduce any acute inflammation, typically at the end of the day.  b. If tolerated, you may take a Non-Steroidal Antiinflammatory medication  (NSAID), such as Advil or Aleve, to help reduce the inflammation tissue.  This can help the overall healing of the injured tissue.  i. It is important to take food with any NSAID medication as the most common side effect is stomach irritation.  If you encounter any problems when taking NSAID, it is recommended that you stop taking the medication and notify your provider.    It is important to understand that most problems that develop in the foot and ankle are caused by excessive tension that cause microinjury to the soft tissues and inflammation in the foot and ankle.  By addressing the underlying causes with support and stretching as well as treating the current inflammatory conditions with tissue massage and anti-inflammatory treatments, most foot and ankle musculoskeletal conditions will resolve.  This may take time to heal.  However, if symptoms persist past 4 weeks you should return to the office for reevaluation to determine further treatment options.

## 2020-12-11 ENCOUNTER — TELEPHONE (OUTPATIENT)
Dept: FAMILY MEDICINE | Facility: CLINIC | Age: 75
End: 2020-12-11

## 2020-12-11 NOTE — TELEPHONE ENCOUNTER
Prior Authorization Retail Medication Request    Medication/Dose: novolin N Flexpen ReliOn  ICD code (if different than what is on RX):  Type 2 diabetes mellitus with diabetic polyneuropathy, without long-term current use of insulin (H) [E11.42]   Previously Tried and Failed:     Rationale:  Pt stable on this med    Insurance Name:  [employer] [email or web address]  Insurance ID:         Pharmacy Information (if different than what is on RX)  Name:  [facility]  Phone:

## 2020-12-11 NOTE — TELEPHONE ENCOUNTER
Central Prior Authorization Team   Phone: [phone]      PA Initiation    Medication: novolin flexpen - INITIATED  Insurance Company: [employer] ([facility]) - Phone [phone] Fax [phone]  Pharmacy Filling the Rx: [facility] [address] [place] [address]  Filling Pharmacy Phone: [phone]  Filling Pharmacy Fax: [phone]  Start Date: 12/11/2020

## 2020-12-13 DIAGNOSIS — E78.5 HYPERLIPIDEMIA LDL GOAL <100: ICD-10-CM

## 2020-12-14 RX ORDER — ROSUVASTATIN CALCIUM 10 MG/1
TABLET, COATED ORAL
Qty: 90 TABLET | Refills: 0 | Status: SHIPPED | OUTPATIENT
Start: 2020-12-14 | End: 2021-02-22

## 2020-12-15 NOTE — TELEPHONE ENCOUNTER
Central Prior Authorization Team   Phone: [phone]        Per [employer] representative Reji, request is still pending review with pharmacist.    Determination should be made in the next 24 hours.    Reference # [id number]

## 2020-12-17 ENCOUNTER — ANTICOAGULATION THERAPY VISIT (OUTPATIENT)
Dept: ANTICOAGULATION | Facility: CLINIC | Age: 75
End: 2020-12-17

## 2020-12-17 DIAGNOSIS — I48.20 CHRONIC ATRIAL FIBRILLATION (H): ICD-10-CM

## 2020-12-17 DIAGNOSIS — Z79.01 LONG TERM CURRENT USE OF ANTICOAGULANT THERAPY: ICD-10-CM

## 2020-12-17 LAB
CAPILLARY BLOOD COLLECTION: NORMAL
INR PPP: 2.2 (ref 0.86–1.14)

## 2020-12-17 PROCEDURE — 36416 COLLJ CAPILLARY BLOOD SPEC: CPT | Performed by: FAMILY MEDICINE

## 2020-12-17 PROCEDURE — 85610 PROTHROMBIN TIME: CPT | Performed by: FAMILY MEDICINE

## 2020-12-17 NOTE — TELEPHONE ENCOUNTER
Central Prior Authorization Team   Phone: [phone]      INS representative, Owen COLUNGA, called to verify tried and failed medications.    PA is now pended for additional review.

## 2020-12-17 NOTE — PROGRESS NOTES
ANTICOAGULATION MANAGEMENT     Patient Name:  Antoine Russo  Date:  2020    ASSESSMENT /SUBJECTIVE:    Today's INR result of 2.20 is therapeutic. Goal INR of 2.0-3.0      Warfarin dose taken: Warfarin taken as instructed    Diet: No new diet changes affecting INR    Medication changes/ interactions: No new medications/supplements affecting INR    Previous INR: Therapeutic 2.10    S/S of bleeding or thromboembolism: No    New injury or illness: No    Upcoming surgery, procedure or cardioversion: No    Additional findings: None      PLAN:    Telephone call with Antoine regarding INR result and instructed:     Warfarin Dosing Instructions: Continue your current warfarin dose 2.5 mg every Tue, Thu, Sat; 1.25 mg all other days    Instructed patient to follow up no later than: 3 weeks  Lab visit scheduled    Education provided: Target INR goal and significance of current INR result      Poli verbalizes understanding and agrees to warfarin dosing plan.    Instructed to call the Anticoagulation Clinic for any changes, questions or concerns. (#[phone])        Maria Victoria Castaneda RN      OBJECTIVE:  Recent labs: (last 7 days)     20  0717   INR 2.20*         No question data found.  Anticoagulation Summary  As of 2020    INR goal:  2.0-3.0   TTR:  48.3 % (11.5 mo)   INR used for dosin.20 (2020)   Warfarin maintenance plan:  2.5 mg (2.5 mg x 1) every Tue, Thu, Sat; 1.25 mg (2.5 mg x 0.5) all other days   Full warfarin instructions:  2.5 mg every Tue, Thu, Sat; 1.25 mg all other days   Weekly warfarin total:  12.5 mg   No change documented:  Maria Victoria Castaneda RN   Plan last modified:  Adeline Duke RN (2020)   Next INR check:  2021   Priority:  Maintenance   Target end date:  Indefinite    Indications    Chronic atrial fibrillation (H) [I48.20]  Long term current use of anticoagulant therapy [Z79.01]             Anticoagulation Episode Summary     INR check location:       Preferred lab:      [facility] [place]    Comments:  *       Anticoagulation Care Providers     Provider Role Specialty Phone number    Katie Martino MD Referring Family Medicine [phone]

## 2020-12-17 NOTE — TELEPHONE ENCOUNTER
Central Prior Authorization Team   Phone: [phone]      Prior Authorization Approval    Authorization Effective Date: 12/17/2020  Authorization Expiration Date: 12/11/2021  Medication: novolin flexpen - APPROVED  Approved Dose/Quantity: 30 FOR 30  Reference #:     Insurance Company: David ([facility]) - Phone [phone] Fax [phone]  Expected CoPay:       CoPay Card Available:      Foundation Assistance Needed:    Which Pharmacy is filling the prescription (Not needed for infusion/clinic administered): [facility] [address] [place] [address] [address]  Pharmacy Notified: Yes  Patient Notified: Yes (**Instructed pharmacy to notify patient when script is ready to /ship.**)

## 2020-12-21 DIAGNOSIS — I10 HYPERTENSION, BENIGN ESSENTIAL, GOAL BELOW 140/90: Chronic | ICD-10-CM

## 2020-12-22 RX ORDER — LISINOPRIL 5 MG/1
TABLET ORAL
Qty: 90 TABLET | Refills: 3 | Status: SHIPPED | OUTPATIENT
Start: 2020-12-22 | End: 2021-10-14

## 2020-12-22 NOTE — TELEPHONE ENCOUNTER
"Requested Prescriptions   Pending Prescriptions Disp Refills     lisinopril (ZESTRIL) 5 MG tablet [Pharmacy Med Name: Lisinopril 5 MG Oral Tablet] 90 tablet 0     Sig: Take 1 tablet by mouth once daily       ACE Inhibitors (Including Combos) Protocol Failed - 12/21/2020  9:39 AM        Failed - Blood pressure under 140/90 in past 12 months     BP Readings from Last 3 Encounters:   12/04/20 (!) 151/70   11/24/20 134/58   10/20/20 (!) 148/65                 Passed - Recent (12 mo) or future (30 days) visit within the authorizing provider's specialty     Patient has had an office visit with the authorizing provider or a provider within the authorizing providers department within the previous 12 mos or has a future within next 30 days. See \"Patient Info\" tab in inbasket, or \"Choose Columns\" in Meds & Orders section of the refill encounter.              Passed - Medication is active on med list        Passed - Patient is age 18 or older        Passed - Normal serum creatinine on file in past 12 months     Recent Labs   Lab Test 10/30/20  0853 07/31/17  0808 07/31/17  0808   CR 1.03   < >  --    CREAT  --   --  1.1    < > = values in this interval not displayed.       Ok to refill medication if creatinine is low          Passed - Normal serum potassium on file in past 12 months     Recent Labs   Lab Test 10/30/20  0853   POTASSIUM 5.2                  "

## 2020-12-28 ENCOUNTER — HOSPITAL ENCOUNTER (OUTPATIENT)
Dept: CT IMAGING | Facility: CLINIC | Age: 75
Discharge: HOME | End: 2020-12-28
Attending: INTERNAL MEDICINE | Admitting: INTERNAL MEDICINE
Payer: COMMERCIAL

## 2020-12-28 LAB
CREAT BLD-MCNC: 0.9 MG/DL (ref 0.66–1.25)
GFR SERPL CREATININE-BSD FRML MDRD: 83 ML/MIN/{1.73_M2}

## 2020-12-28 PROCEDURE — 250N000011 HC RX IP 250 OP 636: Performed by: RADIOLOGY

## 2020-12-28 PROCEDURE — 82565 ASSAY OF CREATININE: CPT

## 2020-12-28 PROCEDURE — 250N000009 HC RX 250: Performed by: RADIOLOGY

## 2020-12-28 PROCEDURE — 74170 CT ABD WO CNTRST FLWD CNTRST: CPT

## 2020-12-28 RX ORDER — IOPAMIDOL 755 MG/ML
97 INJECTION, SOLUTION INTRAVASCULAR ONCE
Status: COMPLETED | OUTPATIENT
Start: 2020-12-28 | End: 2020-12-28

## 2020-12-28 RX ADMIN — SODIUM CHLORIDE 64 ML: 9 INJECTION, SOLUTION INTRAVENOUS at 07:45

## 2020-12-28 RX ADMIN — IOPAMIDOL 97 ML: 755 INJECTION, SOLUTION INTRAVENOUS at 07:45

## 2020-12-29 DIAGNOSIS — E11.42 TYPE 2 DIABETES MELLITUS WITH DIABETIC POLYNEUROPATHY, WITHOUT LONG-TERM CURRENT USE OF INSULIN (H): Chronic | ICD-10-CM

## 2020-12-30 DIAGNOSIS — E11.42 TYPE 2 DIABETES MELLITUS WITH DIABETIC POLYNEUROPATHY, WITHOUT LONG-TERM CURRENT USE OF INSULIN (H): Chronic | ICD-10-CM

## 2020-12-31 RX ORDER — FLASH GLUCOSE SENSOR
KIT MISCELLANEOUS
Qty: 2 EACH | Refills: 0 | OUTPATIENT
Start: 2020-12-31

## 2020-12-31 RX ORDER — FLASH GLUCOSE SCANNING READER
1 EACH MISCELLANEOUS
Qty: 6 EACH | Refills: 1 | Status: SHIPPED | OUTPATIENT
Start: 2020-12-31 | End: 2021-06-08

## 2021-01-06 ENCOUNTER — VIRTUAL VISIT (OUTPATIENT)
Dept: GASTROENTEROLOGY | Facility: CLINIC | Age: 76
End: 2021-01-06
Payer: MEDICARE

## 2021-01-06 VITALS — BODY MASS INDEX: 28.63 KG/M2 | HEIGHT: 70 IN | WEIGHT: 200 LBS

## 2021-01-06 PROCEDURE — 99212 OFFICE O/P EST SF 10 MIN: CPT | Mod: 95 | Performed by: INTERNAL MEDICINE

## 2021-01-06 ASSESSMENT — PAIN SCALES - GENERAL: PAINLEVEL: NO PAIN (0)

## 2021-01-06 ASSESSMENT — MIFFLIN-ST. JEOR: SCORE: 1658.44

## 2021-01-06 NOTE — NURSING NOTE
"Chief Complaint   Patient presents with     RECHECK     follow up, CT done 12/28/20       Vitals:    01/06/21 1206   Weight: 90.7 kg (200 lb)   Height: 1.778 m (5' 10\")       Body mass index is 28.7 kg/m .      Jacky Monge LPN                        "

## 2021-01-06 NOTE — PROGRESS NOTES
Antoine LOPEZ Rafaela is a 75 year old male who is being evaluated via a billable telephone visit.      What phone number would you like to be contacted at? [phone]  How would you like to obtain your AVS? [employer]

## 2021-01-06 NOTE — PATIENT INSTRUCTIONS
Follow up:    Dr. Beasley has outlined the following steps after your recent clinic visit:    -Suggest getting substance abuse treatments      Please call with any questions or concerns regarding your clinic visit today.    It is a pleasure being involved in your health care.    Contacts post-consultation depending on your need:    Schedule Clinic Appointments            [phone] # 1   M-F 7:30 - 5 pm    Destiny Cabrales RN Care Coordinator  [phone]    Jacky Monge LPN    [phone]     OR Procedure Scheduling                             [phone]    My Chart is available 24 hours a day and is a secure way to access your records and communicate with your care team.  I strongly recommend signing up if you haven't already done so, if you are comfortable with computers.  If you would like to inquire about this or are having problems with My Chart access, you may call [phone] or go online at [email or web address].  Please allow at least 24 hours for a response and extra time on weekends and Holidays.

## 2021-01-06 NOTE — PROGRESS NOTES
"Subjective:  Antoine Russo is a 74 year old male with a history of atrial fibrillation on warfarin, CAD, DMII, colitis s/p colectomy, IPMN s/p distal pancreatectomy and a history of chronic pancreatitis related to alcohol abuse who is being evaluated via a billable tellephone visit.  Patient feeling well overall.  Since last hospitalization, patient has not had any episodes of abdominal pain.  He admits that he was \"taking double shots\" of liquor prior to last hospitalization, in addition to drinking several beers a day.  Since that time, he has cut out the hard liquor and only has one or two beers per day.  Never has more than three.  Again, denies abdominal pain, in addition to n/v/d, constipation, blood in stool.  Denies fevers/chills.  Wants to know how his latest CT abdomen turned out, which was done in late December.    Objective:  CT A/P with contrast (12/28/20)  Slight fatty infiltration of the liver is again seen. The  spleen and tail of the pancreas are again shown to be surgically  absent. Chronic stomach wall prominence, particularly along the lesser  curvature, is unchanged. Mild fascial plane thickening involving the  anterior left pararenal space is similar to the prior study. Mild mild  complex fluid density in the left subphrenic area is unchanged.  Bilateral renal cysts are again seen.    A/P   74 year old male with a history of atrial fibrillation on warfarin, CAD, DMII, colitis s/p colectomy, IPMN s/p distal pancreatectomy and a history of chronic pancreatitis related to alcohol abuse.   Patient with unexplained recurrent acute pancreatitis over many years. Post distal pancreatectomy for IPMN. Most recent MRCP shows resolving pancreatitis, but fluid collection in residual head of pancreas. Uncertain whether post inflammatory or new IPMN. Patient previously said he was not drinking alcohol prior to these admissions, but now admits to taking several shots of hard liquor per day, which he has " now stopped.  Since last admission, drinking about one beer per day, without binge episodes, and has not had abdominal pain since that time.    -Continue follow-up with Dr. Beasley    Seen and examined with GI fellow, agree with findings and recommendations.  Brief direct phone follow-up . Reiterated and pt acknowledged that ETOH the cause. Admits to continued drinking. Suggested treatment.   Yovanny Beasley MD GI Staff

## 2021-01-06 NOTE — LETTER
"    1/6/2021         RE: Antoine Russo  [address] [address]        Dear Colleague,    Thank you for referring your patient, Antoine Russo, to the [facility] [place]. Please see a copy of my visit note below.    Antoine Russo is a 75 year old male who is being evaluated via a billable telephone visit.      What phone number would you like to be contacted at? [phone]  How would you like to obtain your AVS? MyChart        Subjective:  Antoine Russo is a 74 year old male with a history of atrial fibrillation on warfarin, CAD, DMII, colitis s/p colectomy, IPMN s/p distal pancreatectomy and a history of chronic pancreatitis related to alcohol abuse who is being evaluated via a billable tellephone visit.  Patient feeling well overall.  Since last hospitalization, patient has not had any episodes of abdominal pain.  He admits that he was \"taking double shots\" of liquor prior to last hospitalization, in addition to drinking several beers a day.  Since that time, he has cut out the hard liquor and only has one or two beers per day.  Never has more than three.  Again, denies abdominal pain, in addition to n/v/d, constipation, blood in stool.  Denies fevers/chills.  Wants to know how his latest CT abdomen turned out, which was done in late December.    Objective:  CT A/P with contrast (12/28/20)  Slight fatty infiltration of the liver is again seen. The  spleen and tail of the pancreas are again shown to be surgically  absent. Chronic stomach wall prominence, particularly along the lesser  curvature, is unchanged. Mild fascial plane thickening involving the  anterior left pararenal space is similar to the prior study. Mild mild  complex fluid density in the left subphrenic area is unchanged.  Bilateral renal cysts are again seen.    A/P   74 year old male with a history of atrial fibrillation on warfarin, CAD, DMII, colitis s/p colectomy, IPMN " s/p distal pancreatectomy and a history of chronic pancreatitis related to alcohol abuse.   Patient with unexplained recurrent acute pancreatitis over many years. Post distal pancreatectomy for IPMN. Most recent MRCP shows resolving pancreatitis, but fluid collection in residual head of pancreas. Uncertain whether post inflammatory or new IPMN. Patient previously said he was not drinking alcohol prior to these admissions, but now admits to taking several shots of hard liquor per day, which he has now stopped.  Since last admission, drinking about one beer per day, without binge episodes, and has not had abdominal pain since that time.    -Continue follow-up with Dr. Beasley    Seen and examined with GI fellow, agree with findings and recommendations.  Brief direct phone follow-up . Reiterated and pt acknowledged that ETOH the cause. Admits to continued drinking. Suggested treatment.   Yovanny Beasley MD GI Staff      Again, thank you for allowing me to participate in the care of your patient.        Sincerely,        Yovanny Beasley MD

## 2021-01-07 ENCOUNTER — TELEPHONE (OUTPATIENT)
Dept: GASTROENTEROLOGY | Facility: CLINIC | Age: 76
End: 2021-01-07

## 2021-01-07 ENCOUNTER — ANTICOAGULATION THERAPY VISIT (OUTPATIENT)
Dept: ANTICOAGULATION | Facility: CLINIC | Age: 76
End: 2021-01-07
Payer: MEDICARE

## 2021-01-07 DIAGNOSIS — Z79.01 LONG TERM CURRENT USE OF ANTICOAGULANT THERAPY: ICD-10-CM

## 2021-01-07 DIAGNOSIS — I48.20 CHRONIC ATRIAL FIBRILLATION (H): ICD-10-CM

## 2021-01-07 LAB
CAPILLARY BLOOD COLLECTION: NORMAL
INR PPP: 2 (ref 0.86–1.14)

## 2021-01-07 PROCEDURE — 85610 PROTHROMBIN TIME: CPT | Performed by: FAMILY MEDICINE

## 2021-01-07 PROCEDURE — 36416 COLLJ CAPILLARY BLOOD SPEC: CPT | Performed by: FAMILY MEDICINE

## 2021-01-07 PROCEDURE — 99207 PR NO CHARGE NURSE ONLY: CPT

## 2021-01-07 NOTE — PROGRESS NOTES
ANTICOAGULATION FOLLOW-UP CLINIC VISIT    Patient Name:  Antoine Russo  Date:  2021  Contact Type:  Telephone    SUBJECTIVE:  Patient Findings     Comments:  No changes in medications, activity, or diet noted. No concerns with clotting, bleeding, or increased bruising noted. Took warfarin as prescribed.  Patient is to continue maintenance warfarin plan, and check INR in 4 weeks.  Patient verbalizes understanding and agrees to plan. No further questions or concerns.        Clinical Outcomes     Negatives:  Major bleeding event, Thromboembolic event, Anticoagulation-related hospital admission, Anticoagulation-related ED visit, Anticoagulation-related fatality    Comments:  No changes in medications, activity, or diet noted. No concerns with clotting, bleeding, or increased bruising noted. Took warfarin as prescribed.  Patient is to continue maintenance warfarin plan, and check INR in 4 weeks.  Patient verbalizes understanding and agrees to plan. No further questions or concerns.           OBJECTIVE    Recent labs: (last 7 days)     21  0730   INR 2.00*       ASSESSMENT / PLAN  INR assessment THER    Recheck INR In: 4 WEEKS    INR Location Clinic      Anticoagulation Summary  As of 2021    INR goal:  2.0-3.0   TTR:  54.3 % (11.5 mo)   INR used for dosin.00 (2021)   Warfarin maintenance plan:  2.5 mg (2.5 mg x 1) every Tue, Thu, Sat; 1.25 mg (2.5 mg x 0.5) all other days   Full warfarin instructions:  2.5 mg every Tue, Thu, Sat; 1.25 mg all other days   Weekly warfarin total:  12.5 mg   No change documented:  Madhavi Sanders RN   Plan last modified:  Adeline Duke RN (2020)   Next INR check:  2021   Priority:  Maintenance   Target end date:  Indefinite    Indications    Chronic atrial fibrillation (H) [I48.20]  Long term current use of anticoagulant therapy [Z79.01]             Anticoagulation Episode Summary     INR check location:      Preferred lab:      Send INR reminders  to:  BARI McGehee    Comments:  *       Anticoagulation Care Providers     Provider Role Specialty Phone number    Katie Martino MD Referring Family Medicine [phone]            See the Encounter Report to view Anticoagulation Flowsheet and Dosing Calendar (Go to Encounters tab in chart review, and find the Anticoagulation Therapy Visit)        Madhavi Sanders RN

## 2021-01-07 NOTE — TELEPHONE ENCOUNTER
Called Pt to follow up on clinic visit with Dr. Beasley yesterday. All questions and concerns addressed.    Jacky Monge LPN

## 2021-01-14 ENCOUNTER — HEALTH MAINTENANCE LETTER (OUTPATIENT)
Age: 76
End: 2021-01-14

## 2021-02-04 ENCOUNTER — ANTICOAGULATION THERAPY VISIT (OUTPATIENT)
Dept: ANTICOAGULATION | Facility: CLINIC | Age: 76
End: 2021-02-04

## 2021-02-04 DIAGNOSIS — Z79.01 LONG TERM CURRENT USE OF ANTICOAGULANT THERAPY: ICD-10-CM

## 2021-02-04 DIAGNOSIS — I48.20 CHRONIC ATRIAL FIBRILLATION (H): ICD-10-CM

## 2021-02-04 LAB
CAPILLARY BLOOD COLLECTION: NORMAL
INR PPP: 2 (ref 0.86–1.14)

## 2021-02-04 PROCEDURE — 85610 PROTHROMBIN TIME: CPT | Performed by: FAMILY MEDICINE

## 2021-02-04 PROCEDURE — 36416 COLLJ CAPILLARY BLOOD SPEC: CPT | Performed by: FAMILY MEDICINE

## 2021-02-04 NOTE — PROGRESS NOTES
ANTICOAGULATION MANAGEMENT     Patient Name:  Antoine Russo  Date:  2021    ASSESSMENT /SUBJECTIVE:    Today's INR result of 2.00 is therapeutic. Goal INR of 2.0-3.0      Warfarin dose taken: Warfarin taken as instructed    Diet: No new diet changes affecting INR    Medication changes/ interactions: No new medications/supplements affecting INR    Previous INR: Therapeutic 2.00    S/S of bleeding or thromboembolism: No    New injury or illness: No    Upcoming surgery, procedure or cardioversion: No    Additional findings: None      PLAN:    Telephone call with  Poli regarding INR result and instructed:     Warfarin Dosing Instructions: Continue your current warfarin dose 2.5mg Tues/Thurs/Sat; 1.25mg all other days    Instructed patient to follow up no later than: 4 weeks  Lab visit scheduled    Education provided: Importance of notifying clinic for changes in medications or health; a sooner lab recheck maybe needed      Poli verbalizes understanding and agrees to warfarin dosing plan.    Instructed to call the Anticoagulation Clinic for any changes, questions or concerns. (#[phone])        Adeline Duke RN CACP       OBJECTIVE:  Recent labs: (last 7 days)     21  0730   INR 2.00*         INR assessment THER    Recheck INR In: 4 WEEKS    INR Location Clinic      Anticoagulation Summary  As of 2021    INR goal:  2.0-3.0   TTR:  61.2 % (11.5 mo)   INR used for dosin.00 (2021)   Warfarin maintenance plan:  2.5 mg (2.5 mg x 1) every Tue, Thu, Sat; 1.25 mg (2.5 mg x 0.5) all other days   Full warfarin instructions:  2.5 mg every Tue, Thu, Sat; 1.25 mg all other days   Weekly warfarin total:  12.5 mg   No change documented:  Adeline Duke RN   Plan last modified:  Adeline Duke RN (2020)   Next INR check:  3/4/2021   Priority:  Maintenance   Target end date:  Indefinite    Indications    Chronic atrial fibrillation (H) [I48.20]  Long term current use of  anticoagulant therapy [Z79.01]             Anticoagulation Episode Summary     INR check location:      Preferred lab:      Send INR reminders to:  [facility]    Comments:  *       Anticoagulation Care Providers     Provider Role Specialty Phone number    Katie Martino MD Referring Family Medicine [phone]

## 2021-02-15 ENCOUNTER — HOSPITAL ENCOUNTER (INPATIENT)
Facility: CLINIC | Age: 76
LOS: 2 days | Discharge: HOME | DRG: 439 | End: 2021-02-18
Attending: EMERGENCY MEDICINE | Admitting: INTERNAL MEDICINE
Payer: COMMERCIAL

## 2021-02-15 DIAGNOSIS — K85.20 ALCOHOL-INDUCED ACUTE PANCREATITIS WITHOUT INFECTION OR NECROSIS: ICD-10-CM

## 2021-02-15 DIAGNOSIS — K85.80 OTHER ACUTE PANCREATITIS, UNSPECIFIED COMPLICATION STATUS: Primary | ICD-10-CM

## 2021-02-15 DIAGNOSIS — Z79.01 LONG TERM CURRENT USE OF ANTICOAGULANT THERAPY: Chronic | ICD-10-CM

## 2021-02-15 DIAGNOSIS — Z11.52 ENCOUNTER FOR SCREENING LABORATORY TESTING FOR COVID-19 VIRUS: ICD-10-CM

## 2021-02-15 PROBLEM — K85.90 ACUTE PANCREATITIS: Status: ACTIVE | Noted: 2018-10-21

## 2021-02-15 LAB
ALBUMIN SERPL-MCNC: 3.4 G/DL (ref 3.4–5)
ALP SERPL-CCNC: 57 U/L (ref 40–150)
ALT SERPL W P-5'-P-CCNC: 26 U/L (ref 0–70)
ANION GAP SERPL CALCULATED.3IONS-SCNC: 6 MMOL/L (ref 3–14)
ANISOCYTOSIS BLD QL SMEAR: SLIGHT
AST SERPL W P-5'-P-CCNC: 21 U/L (ref 0–45)
BASOPHILS # BLD AUTO: 0 10E9/L (ref 0–0.2)
BASOPHILS NFR BLD AUTO: 0 %
BILIRUB SERPL-MCNC: 0.7 MG/DL (ref 0.2–1.3)
BUN SERPL-MCNC: 19 MG/DL (ref 7–30)
CALCIUM SERPL-MCNC: 9.3 MG/DL (ref 8.5–10.1)
CHLORIDE SERPL-SCNC: 103 MMOL/L (ref 94–109)
CO2 SERPL-SCNC: 28 MMOL/L (ref 20–32)
CREAT SERPL-MCNC: 0.94 MG/DL (ref 0.66–1.25)
DIFFERENTIAL METHOD BLD: ABNORMAL
EOSINOPHIL # BLD AUTO: 0.1 10E9/L (ref 0–0.7)
EOSINOPHIL NFR BLD AUTO: 1 %
ERYTHROCYTE [DISTWIDTH] IN BLOOD BY AUTOMATED COUNT: 16 % (ref 10–15)
GFR SERPL CREATININE-BSD FRML MDRD: 81 ML/MIN/{1.73_M2}
GLUCOSE SERPL-MCNC: 171 MG/DL (ref 70–99)
HCT VFR BLD AUTO: 47.1 % (ref 40–53)
HGB BLD-MCNC: 15.7 G/DL (ref 13.3–17.7)
INR PPP: 2.19 (ref 0.86–1.14)
LACTATE BLD-SCNC: 1.4 MMOL/L (ref 0.7–2)
LIPASE SERPL-CCNC: 1217 U/L (ref 73–393)
LYMPHOCYTES # BLD AUTO: 2.5 10E9/L (ref 0.8–5.3)
LYMPHOCYTES NFR BLD AUTO: 20 %
MCH RBC QN AUTO: 32.4 PG (ref 26.5–33)
MCHC RBC AUTO-ENTMCNC: 33.3 G/DL (ref 31.5–36.5)
MCV RBC AUTO: 97 FL (ref 78–100)
MONOCYTES # BLD AUTO: 0.9 10E9/L (ref 0–1.3)
MONOCYTES NFR BLD AUTO: 7 %
NEUTROPHILS # BLD AUTO: 9 10E9/L (ref 1.6–8.3)
NEUTROPHILS NFR BLD AUTO: 72 %
PLATELET # BLD AUTO: 242 10E9/L (ref 150–450)
PLATELET # BLD EST: ABNORMAL 10*3/UL
POIKILOCYTOSIS BLD QL SMEAR: SLIGHT
POTASSIUM SERPL-SCNC: 4.3 MMOL/L (ref 3.4–5.3)
PROT SERPL-MCNC: 7.4 G/DL (ref 6.8–8.8)
RBC # BLD AUTO: 4.85 10E12/L (ref 4.4–5.9)
SODIUM SERPL-SCNC: 137 MMOL/L (ref 133–144)
VARIANT LYMPHS BLD QL SMEAR: PRESENT
WBC # BLD AUTO: 12.5 10E9/L (ref 4–11)

## 2021-02-15 PROCEDURE — 83690 ASSAY OF LIPASE: CPT | Performed by: EMERGENCY MEDICINE

## 2021-02-15 PROCEDURE — 87635 SARS-COV-2 COVID-19 AMP PRB: CPT | Performed by: EMERGENCY MEDICINE

## 2021-02-15 PROCEDURE — 250N000011 HC RX IP 250 OP 636: Performed by: EMERGENCY MEDICINE

## 2021-02-15 PROCEDURE — HZ2ZZZZ DETOXIFICATION SERVICES FOR SUBSTANCE ABUSE TREATMENT: ICD-10-PCS | Performed by: INTERNAL MEDICINE

## 2021-02-15 PROCEDURE — 99285 EMERGENCY DEPT VISIT HI MDM: CPT | Performed by: EMERGENCY MEDICINE

## 2021-02-15 PROCEDURE — 80053 COMPREHEN METABOLIC PANEL: CPT | Performed by: EMERGENCY MEDICINE

## 2021-02-15 PROCEDURE — 99285 EMERGENCY DEPT VISIT HI MDM: CPT | Mod: 25 | Performed by: EMERGENCY MEDICINE

## 2021-02-15 PROCEDURE — 85610 PROTHROMBIN TIME: CPT | Performed by: EMERGENCY MEDICINE

## 2021-02-15 PROCEDURE — C9803 HOPD COVID-19 SPEC COLLECT: HCPCS | Performed by: EMERGENCY MEDICINE

## 2021-02-15 PROCEDURE — 96365 THER/PROPH/DIAG IV INF INIT: CPT | Performed by: EMERGENCY MEDICINE

## 2021-02-15 PROCEDURE — 85025 COMPLETE CBC W/AUTO DIFF WBC: CPT | Performed by: EMERGENCY MEDICINE

## 2021-02-15 PROCEDURE — 258N000003 HC RX IP 258 OP 636: Performed by: EMERGENCY MEDICINE

## 2021-02-15 PROCEDURE — 83605 ASSAY OF LACTIC ACID: CPT | Performed by: EMERGENCY MEDICINE

## 2021-02-15 PROCEDURE — 96375 TX/PRO/DX INJ NEW DRUG ADDON: CPT | Performed by: EMERGENCY MEDICINE

## 2021-02-15 RX ORDER — ONDANSETRON 2 MG/ML
4 INJECTION INTRAMUSCULAR; INTRAVENOUS ONCE
Status: COMPLETED | OUTPATIENT
Start: 2021-02-15 | End: 2021-02-16

## 2021-02-15 RX ORDER — HYDROMORPHONE HYDROCHLORIDE 1 MG/ML
0.5 INJECTION, SOLUTION INTRAMUSCULAR; INTRAVENOUS; SUBCUTANEOUS
Status: DISCONTINUED | OUTPATIENT
Start: 2021-02-15 | End: 2021-02-16 | Stop reason: DRUGHIGH

## 2021-02-15 RX ADMIN — FAMOTIDINE 20 MG: 20 INJECTION, SOLUTION INTRAVENOUS at 23:16

## 2021-02-15 RX ADMIN — HYDROMORPHONE HYDROCHLORIDE 0.5 MG: 1 INJECTION, SOLUTION INTRAMUSCULAR; INTRAVENOUS; SUBCUTANEOUS at 23:34

## 2021-02-15 RX ADMIN — SODIUM CHLORIDE, POTASSIUM CHLORIDE, SODIUM LACTATE AND CALCIUM CHLORIDE 1000 ML: 600; 310; 30; 20 INJECTION, SOLUTION INTRAVENOUS at 22:55

## 2021-02-15 RX ADMIN — SODIUM CHLORIDE, POTASSIUM CHLORIDE, SODIUM LACTATE AND CALCIUM CHLORIDE 1000 ML: 600; 310; 30; 20 INJECTION, SOLUTION INTRAVENOUS at 23:47

## 2021-02-16 PROBLEM — Z79.01 LONG TERM CURRENT USE OF ANTICOAGULANT THERAPY: Chronic | Status: ACTIVE | Noted: 2018-04-05

## 2021-02-16 PROBLEM — K86.3 PANCREATIC PSEUDOCYST: Chronic | Status: ACTIVE | Noted: 2019-04-18

## 2021-02-16 PROBLEM — Z11.52 ENCOUNTER FOR SCREENING LABORATORY TESTING FOR COVID-19 VIRUS: Status: ACTIVE | Noted: 2021-02-16

## 2021-02-16 PROBLEM — K85.90 ACUTE ON CHRONIC PANCREATITIS (H): Status: ACTIVE | Noted: 2021-02-16

## 2021-02-16 PROBLEM — G62.9 PERIPHERAL POLYNEUROPATHY: Chronic | Status: ACTIVE | Noted: 2020-05-12

## 2021-02-16 PROBLEM — R11.2 NAUSEA WITH VOMITING: Status: ACTIVE | Noted: 2021-02-16

## 2021-02-16 PROBLEM — D72.829 LEUKOCYTOSIS: Status: ACTIVE | Noted: 2021-02-16

## 2021-02-16 PROBLEM — E86.0 DEHYDRATION: Status: ACTIVE | Noted: 2021-02-16

## 2021-02-16 PROBLEM — F10.10 ALCOHOL ABUSE: Status: ACTIVE | Noted: 2021-02-16

## 2021-02-16 PROBLEM — K86.1 ACUTE ON CHRONIC PANCREATITIS (H): Status: ACTIVE | Noted: 2021-02-16

## 2021-02-16 LAB
ANION GAP SERPL CALCULATED.3IONS-SCNC: 4 MMOL/L (ref 3–14)
BUN SERPL-MCNC: 16 MG/DL (ref 7–30)
CALCIUM SERPL-MCNC: 8.3 MG/DL (ref 8.5–10.1)
CHLORIDE SERPL-SCNC: 105 MMOL/L (ref 94–109)
CO2 SERPL-SCNC: 31 MMOL/L (ref 20–32)
CREAT SERPL-MCNC: 0.82 MG/DL (ref 0.66–1.25)
ERYTHROCYTE [DISTWIDTH] IN BLOOD BY AUTOMATED COUNT: 16.2 % (ref 10–15)
GFR SERPL CREATININE-BSD FRML MDRD: 87 ML/MIN/{1.73_M2}
GLUCOSE BLDC GLUCOMTR-MCNC: 140 MG/DL (ref 70–99)
GLUCOSE BLDC GLUCOMTR-MCNC: 149 MG/DL (ref 70–99)
GLUCOSE BLDC GLUCOMTR-MCNC: 152 MG/DL (ref 70–99)
GLUCOSE BLDC GLUCOMTR-MCNC: 154 MG/DL (ref 70–99)
GLUCOSE BLDC GLUCOMTR-MCNC: 160 MG/DL (ref 70–99)
GLUCOSE BLDC GLUCOMTR-MCNC: 163 MG/DL (ref 70–99)
GLUCOSE SERPL-MCNC: 168 MG/DL (ref 70–99)
HCT VFR BLD AUTO: 45.1 % (ref 40–53)
HGB BLD-MCNC: 14.5 G/DL (ref 13.3–17.7)
LABORATORY COMMENT REPORT: NORMAL
LIPASE SERPL-CCNC: 890 U/L (ref 73–393)
MAGNESIUM SERPL-MCNC: 1.3 MG/DL (ref 1.6–2.3)
MCH RBC QN AUTO: 31.9 PG (ref 26.5–33)
MCHC RBC AUTO-ENTMCNC: 32.2 G/DL (ref 31.5–36.5)
MCV RBC AUTO: 99 FL (ref 78–100)
PHOSPHATE SERPL-MCNC: 3.2 MG/DL (ref 2.5–4.5)
PLATELET # BLD AUTO: 205 10E9/L (ref 150–450)
POTASSIUM SERPL-SCNC: 4.7 MMOL/L (ref 3.4–5.3)
RBC # BLD AUTO: 4.55 10E12/L (ref 4.4–5.9)
SARS-COV-2 RNA RESP QL NAA+PROBE: NEGATIVE
SODIUM SERPL-SCNC: 140 MMOL/L (ref 133–144)
SPECIMEN SOURCE: NORMAL
WBC # BLD AUTO: 11.8 10E9/L (ref 4–11)

## 2021-02-16 PROCEDURE — 250N000011 HC RX IP 250 OP 636: Performed by: EMERGENCY MEDICINE

## 2021-02-16 PROCEDURE — 250N000011 HC RX IP 250 OP 636: Performed by: FAMILY MEDICINE

## 2021-02-16 PROCEDURE — 250N000012 HC RX MED GY IP 250 OP 636 PS 637: Performed by: PHYSICIAN ASSISTANT

## 2021-02-16 PROCEDURE — 999N001017 HC STATISTIC GLUCOSE BY METER IP

## 2021-02-16 PROCEDURE — 85027 COMPLETE CBC AUTOMATED: CPT | Performed by: PHYSICIAN ASSISTANT

## 2021-02-16 PROCEDURE — 120N000001 HC R&B MED SURG/OB

## 2021-02-16 PROCEDURE — 83690 ASSAY OF LIPASE: CPT | Performed by: PHYSICIAN ASSISTANT

## 2021-02-16 PROCEDURE — 96375 TX/PRO/DX INJ NEW DRUG ADDON: CPT | Performed by: EMERGENCY MEDICINE

## 2021-02-16 PROCEDURE — G0378 HOSPITAL OBSERVATION PER HR: HCPCS

## 2021-02-16 PROCEDURE — 258N000003 HC RX IP 258 OP 636: Performed by: PHYSICIAN ASSISTANT

## 2021-02-16 PROCEDURE — 250N000013 HC RX MED GY IP 250 OP 250 PS 637: Performed by: INTERNAL MEDICINE

## 2021-02-16 PROCEDURE — 36415 COLL VENOUS BLD VENIPUNCTURE: CPT | Performed by: INTERNAL MEDICINE

## 2021-02-16 PROCEDURE — 250N000013 HC RX MED GY IP 250 OP 250 PS 637: Performed by: FAMILY MEDICINE

## 2021-02-16 PROCEDURE — 36415 COLL VENOUS BLD VENIPUNCTURE: CPT | Performed by: PHYSICIAN ASSISTANT

## 2021-02-16 PROCEDURE — 96372 THER/PROPH/DIAG INJ SC/IM: CPT | Performed by: PHYSICIAN ASSISTANT

## 2021-02-16 PROCEDURE — 250N000013 HC RX MED GY IP 250 OP 250 PS 637: Performed by: PHYSICIAN ASSISTANT

## 2021-02-16 PROCEDURE — 84100 ASSAY OF PHOSPHORUS: CPT | Performed by: INTERNAL MEDICINE

## 2021-02-16 PROCEDURE — 80048 BASIC METABOLIC PNL TOTAL CA: CPT | Performed by: PHYSICIAN ASSISTANT

## 2021-02-16 PROCEDURE — 99223 1ST HOSP IP/OBS HIGH 75: CPT | Mod: AI | Performed by: INTERNAL MEDICINE

## 2021-02-16 PROCEDURE — 83735 ASSAY OF MAGNESIUM: CPT | Performed by: INTERNAL MEDICINE

## 2021-02-16 RX ORDER — MAGNESIUM SULFATE HEPTAHYDRATE 40 MG/ML
4 INJECTION, SOLUTION INTRAVENOUS ONCE
Status: COMPLETED | OUTPATIENT
Start: 2021-02-16 | End: 2021-02-16

## 2021-02-16 RX ORDER — HYDROMORPHONE HYDROCHLORIDE 1 MG/ML
.3-.5 INJECTION, SOLUTION INTRAMUSCULAR; INTRAVENOUS; SUBCUTANEOUS
Status: DISCONTINUED | OUTPATIENT
Start: 2021-02-16 | End: 2021-02-18 | Stop reason: HOSPADM

## 2021-02-16 RX ORDER — NALOXONE HYDROCHLORIDE 0.4 MG/ML
0.2 INJECTION, SOLUTION INTRAMUSCULAR; INTRAVENOUS; SUBCUTANEOUS
Status: DISCONTINUED | OUTPATIENT
Start: 2021-02-16 | End: 2021-02-18 | Stop reason: HOSPADM

## 2021-02-16 RX ORDER — LORAZEPAM 2 MG/ML
1-2 INJECTION INTRAMUSCULAR EVERY 30 MIN PRN
Status: DISCONTINUED | OUTPATIENT
Start: 2021-02-16 | End: 2021-02-18 | Stop reason: HOSPADM

## 2021-02-16 RX ORDER — PROCHLORPERAZINE MALEATE 5 MG
5 TABLET ORAL EVERY 6 HOURS PRN
Status: DISCONTINUED | OUTPATIENT
Start: 2021-02-16 | End: 2021-02-18 | Stop reason: HOSPADM

## 2021-02-16 RX ORDER — MULTIPLE VITAMINS W/ MINERALS TAB 9MG-400MCG
1 TAB ORAL DAILY
Status: DISCONTINUED | OUTPATIENT
Start: 2021-02-16 | End: 2021-02-18 | Stop reason: HOSPADM

## 2021-02-16 RX ORDER — LANOLIN ALCOHOL/MO/W.PET/CERES
100 CREAM (GRAM) TOPICAL DAILY
Status: DISCONTINUED | OUTPATIENT
Start: 2021-02-23 | End: 2021-02-18 | Stop reason: HOSPADM

## 2021-02-16 RX ORDER — LANOLIN ALCOHOL/MO/W.PET/CERES
100 CREAM (GRAM) TOPICAL 3 TIMES DAILY
Status: DISCONTINUED | OUTPATIENT
Start: 2021-02-18 | End: 2021-02-18 | Stop reason: HOSPADM

## 2021-02-16 RX ORDER — LANOLIN ALCOHOL/MO/W.PET/CERES
200 CREAM (GRAM) TOPICAL 3 TIMES DAILY
Status: COMPLETED | OUTPATIENT
Start: 2021-02-16 | End: 2021-02-17

## 2021-02-16 RX ORDER — METOPROLOL TARTRATE 25 MG/1
75 TABLET, FILM COATED ORAL 2 TIMES DAILY
Status: DISCONTINUED | OUTPATIENT
Start: 2021-02-16 | End: 2021-02-18 | Stop reason: HOSPADM

## 2021-02-16 RX ORDER — ONDANSETRON 2 MG/ML
4 INJECTION INTRAMUSCULAR; INTRAVENOUS EVERY 6 HOURS PRN
Status: DISCONTINUED | OUTPATIENT
Start: 2021-02-16 | End: 2021-02-18 | Stop reason: HOSPADM

## 2021-02-16 RX ORDER — LORAZEPAM 1 MG/1
1-2 TABLET ORAL EVERY 30 MIN PRN
Status: DISCONTINUED | OUTPATIENT
Start: 2021-02-16 | End: 2021-02-18 | Stop reason: HOSPADM

## 2021-02-16 RX ORDER — LABETALOL 20 MG/4 ML (5 MG/ML) INTRAVENOUS SYRINGE
20 EVERY 4 HOURS PRN
Status: DISCONTINUED | OUTPATIENT
Start: 2021-02-16 | End: 2021-02-18 | Stop reason: HOSPADM

## 2021-02-16 RX ORDER — ROSUVASTATIN CALCIUM 5 MG/1
10 TABLET, COATED ORAL DAILY
Status: DISCONTINUED | OUTPATIENT
Start: 2021-02-16 | End: 2021-02-18 | Stop reason: HOSPADM

## 2021-02-16 RX ORDER — NALOXONE HYDROCHLORIDE 0.4 MG/ML
0.4 INJECTION, SOLUTION INTRAMUSCULAR; INTRAVENOUS; SUBCUTANEOUS
Status: DISCONTINUED | OUTPATIENT
Start: 2021-02-16 | End: 2021-02-18 | Stop reason: HOSPADM

## 2021-02-16 RX ORDER — SODIUM CHLORIDE, SODIUM LACTATE, POTASSIUM CHLORIDE, CALCIUM CHLORIDE 600; 310; 30; 20 MG/100ML; MG/100ML; MG/100ML; MG/100ML
INJECTION, SOLUTION INTRAVENOUS CONTINUOUS
Status: DISCONTINUED | OUTPATIENT
Start: 2021-02-16 | End: 2021-02-18 | Stop reason: HOSPADM

## 2021-02-16 RX ORDER — NALOXONE HYDROCHLORIDE 0.4 MG/ML
0.2 INJECTION, SOLUTION INTRAMUSCULAR; INTRAVENOUS; SUBCUTANEOUS
Status: DISCONTINUED | OUTPATIENT
Start: 2021-02-16 | End: 2021-02-17

## 2021-02-16 RX ORDER — PROCHLORPERAZINE 25 MG
12.5 SUPPOSITORY, RECTAL RECTAL EVERY 12 HOURS PRN
Status: DISCONTINUED | OUTPATIENT
Start: 2021-02-16 | End: 2021-02-18 | Stop reason: HOSPADM

## 2021-02-16 RX ORDER — FOLIC ACID 1 MG/1
1 TABLET ORAL DAILY
Status: DISCONTINUED | OUTPATIENT
Start: 2021-02-16 | End: 2021-02-18 | Stop reason: HOSPADM

## 2021-02-16 RX ORDER — CLONIDINE HYDROCHLORIDE 0.1 MG/1
0.1 TABLET ORAL EVERY 8 HOURS
Status: DISCONTINUED | OUTPATIENT
Start: 2021-02-16 | End: 2021-02-18 | Stop reason: HOSPADM

## 2021-02-16 RX ORDER — FLUMAZENIL 0.1 MG/ML
0.2 INJECTION, SOLUTION INTRAVENOUS
Status: DISCONTINUED | OUTPATIENT
Start: 2021-02-16 | End: 2021-02-18 | Stop reason: HOSPADM

## 2021-02-16 RX ORDER — NALOXONE HYDROCHLORIDE 0.4 MG/ML
0.4 INJECTION, SOLUTION INTRAMUSCULAR; INTRAVENOUS; SUBCUTANEOUS
Status: DISCONTINUED | OUTPATIENT
Start: 2021-02-16 | End: 2021-02-17

## 2021-02-16 RX ORDER — ONDANSETRON 4 MG/1
4 TABLET, ORALLY DISINTEGRATING ORAL EVERY 6 HOURS PRN
Status: DISCONTINUED | OUTPATIENT
Start: 2021-02-16 | End: 2021-02-18 | Stop reason: HOSPADM

## 2021-02-16 RX ORDER — PREGABALIN 75 MG/1
75 CAPSULE ORAL 2 TIMES DAILY
Status: DISCONTINUED | OUTPATIENT
Start: 2021-02-16 | End: 2021-02-18 | Stop reason: HOSPADM

## 2021-02-16 RX ORDER — OXYCODONE HYDROCHLORIDE 5 MG/1
5-10 TABLET ORAL
Status: DISCONTINUED | OUTPATIENT
Start: 2021-02-16 | End: 2021-02-18 | Stop reason: HOSPADM

## 2021-02-16 RX ORDER — OLANZAPINE 5 MG/1
5-10 TABLET, ORALLY DISINTEGRATING ORAL EVERY 6 HOURS PRN
Status: DISCONTINUED | OUTPATIENT
Start: 2021-02-16 | End: 2021-02-18 | Stop reason: HOSPADM

## 2021-02-16 RX ORDER — TAMSULOSIN HYDROCHLORIDE 0.4 MG/1
0.4 CAPSULE ORAL DAILY
Status: DISCONTINUED | OUTPATIENT
Start: 2021-02-16 | End: 2021-02-18 | Stop reason: HOSPADM

## 2021-02-16 RX ORDER — HALOPERIDOL 5 MG/ML
2.5-5 INJECTION INTRAMUSCULAR EVERY 6 HOURS PRN
Status: DISCONTINUED | OUTPATIENT
Start: 2021-02-16 | End: 2021-02-18 | Stop reason: HOSPADM

## 2021-02-16 RX ORDER — WARFARIN SODIUM 2.5 MG/1
2.5 TABLET ORAL
Status: COMPLETED | OUTPATIENT
Start: 2021-02-16 | End: 2021-02-16

## 2021-02-16 RX ORDER — ACETAMINOPHEN 325 MG/1
650 TABLET ORAL EVERY 4 HOURS PRN
Status: DISCONTINUED | OUTPATIENT
Start: 2021-02-16 | End: 2021-02-18 | Stop reason: HOSPADM

## 2021-02-16 RX ORDER — NICOTINE POLACRILEX 4 MG
15-30 LOZENGE BUCCAL
Status: DISCONTINUED | OUTPATIENT
Start: 2021-02-16 | End: 2021-02-18 | Stop reason: HOSPADM

## 2021-02-16 RX ORDER — DEXTROSE MONOHYDRATE 25 G/50ML
25-50 INJECTION, SOLUTION INTRAVENOUS
Status: DISCONTINUED | OUTPATIENT
Start: 2021-02-16 | End: 2021-02-18 | Stop reason: HOSPADM

## 2021-02-16 RX ADMIN — ONDANSETRON 4 MG: 2 INJECTION INTRAMUSCULAR; INTRAVENOUS at 00:14

## 2021-02-16 RX ADMIN — SODIUM CHLORIDE, POTASSIUM CHLORIDE, SODIUM LACTATE AND CALCIUM CHLORIDE: 600; 310; 30; 20 INJECTION, SOLUTION INTRAVENOUS at 16:41

## 2021-02-16 RX ADMIN — HYDROMORPHONE HYDROCHLORIDE 0.5 MG: 1 INJECTION, SOLUTION INTRAMUSCULAR; INTRAVENOUS; SUBCUTANEOUS at 02:10

## 2021-02-16 RX ADMIN — METOPROLOL TARTRATE 75 MG: 25 TABLET, FILM COATED ORAL at 07:51

## 2021-02-16 RX ADMIN — SODIUM CHLORIDE, POTASSIUM CHLORIDE, SODIUM LACTATE AND CALCIUM CHLORIDE: 600; 310; 30; 20 INJECTION, SOLUTION INTRAVENOUS at 07:54

## 2021-02-16 RX ADMIN — HYDROMORPHONE HYDROCHLORIDE 0.5 MG: 1 INJECTION, SOLUTION INTRAMUSCULAR; INTRAVENOUS; SUBCUTANEOUS at 23:15

## 2021-02-16 RX ADMIN — METOPROLOL TARTRATE 75 MG: 25 TABLET, FILM COATED ORAL at 20:47

## 2021-02-16 RX ADMIN — INSULIN ASPART 1 UNITS: 100 INJECTION, SOLUTION INTRAVENOUS; SUBCUTANEOUS at 01:17

## 2021-02-16 RX ADMIN — MULTIPLE VITAMINS W/ MINERALS TAB 1 TABLET: TAB at 07:51

## 2021-02-16 RX ADMIN — CLONIDINE HYDROCHLORIDE 0.1 MG: 0.1 TABLET ORAL at 23:14

## 2021-02-16 RX ADMIN — ONDANSETRON 4 MG: 4 TABLET, ORALLY DISINTEGRATING ORAL at 06:14

## 2021-02-16 RX ADMIN — WARFARIN SODIUM 2.5 MG: 2.5 TABLET ORAL at 17:27

## 2021-02-16 RX ADMIN — FAMOTIDINE 20 MG: 20 INJECTION, SOLUTION INTRAVENOUS at 23:10

## 2021-02-16 RX ADMIN — THIAMINE HCL TAB 100 MG 200 MG: 100 TAB at 20:46

## 2021-02-16 RX ADMIN — TAMSULOSIN HYDROCHLORIDE 0.4 MG: 0.4 CAPSULE ORAL at 07:50

## 2021-02-16 RX ADMIN — SODIUM CHLORIDE, POTASSIUM CHLORIDE, SODIUM LACTATE AND CALCIUM CHLORIDE: 600; 310; 30; 20 INJECTION, SOLUTION INTRAVENOUS at 01:16

## 2021-02-16 RX ADMIN — PREGABALIN 75 MG: 75 CAPSULE ORAL at 20:47

## 2021-02-16 RX ADMIN — INSULIN ASPART 1 UNITS: 100 INJECTION, SOLUTION INTRAVENOUS; SUBCUTANEOUS at 20:53

## 2021-02-16 RX ADMIN — ONDANSETRON 4 MG: 2 INJECTION INTRAMUSCULAR; INTRAVENOUS at 13:45

## 2021-02-16 RX ADMIN — INSULIN ASPART 1 UNITS: 100 INJECTION, SOLUTION INTRAVENOUS; SUBCUTANEOUS at 06:08

## 2021-02-16 RX ADMIN — PREGABALIN 75 MG: 75 CAPSULE ORAL at 01:16

## 2021-02-16 RX ADMIN — HYDROMORPHONE HYDROCHLORIDE 0.5 MG: 1 INJECTION, SOLUTION INTRAMUSCULAR; INTRAVENOUS; SUBCUTANEOUS at 20:50

## 2021-02-16 RX ADMIN — THIAMINE HCL TAB 100 MG 200 MG: 100 TAB at 07:51

## 2021-02-16 RX ADMIN — CLONIDINE HYDROCHLORIDE 0.1 MG: 0.1 TABLET ORAL at 07:53

## 2021-02-16 RX ADMIN — PREGABALIN 75 MG: 75 CAPSULE ORAL at 07:50

## 2021-02-16 RX ADMIN — PROCHLORPERAZINE EDISYLATE 5 MG: 5 INJECTION INTRAMUSCULAR; INTRAVENOUS at 02:10

## 2021-02-16 RX ADMIN — MAGNESIUM SULFATE 4 G: 4 INJECTION INTRAVENOUS at 10:35

## 2021-02-16 RX ADMIN — THIAMINE HCL TAB 100 MG 200 MG: 100 TAB at 15:48

## 2021-02-16 RX ADMIN — CLONIDINE HYDROCHLORIDE 0.1 MG: 0.1 TABLET ORAL at 15:48

## 2021-02-16 RX ADMIN — HYDROMORPHONE HYDROCHLORIDE 0.5 MG: 1 INJECTION, SOLUTION INTRAMUSCULAR; INTRAVENOUS; SUBCUTANEOUS at 13:47

## 2021-02-16 RX ADMIN — PROCHLORPERAZINE EDISYLATE 5 MG: 5 INJECTION INTRAMUSCULAR; INTRAVENOUS at 08:28

## 2021-02-16 RX ADMIN — FOLIC ACID 1 MG: 1 TABLET ORAL at 07:51

## 2021-02-16 RX ADMIN — ROSUVASTATIN CALCIUM 10 MG: 5 TABLET, FILM COATED ORAL at 07:49

## 2021-02-16 RX ADMIN — WARFARIN SODIUM 1.25 MG: 2.5 TABLET ORAL at 01:17

## 2021-02-16 RX ADMIN — FAMOTIDINE 20 MG: 20 INJECTION, SOLUTION INTRAVENOUS at 12:42

## 2021-02-16 ASSESSMENT — ACTIVITIES OF DAILY LIVING (ADL)
ADLS_ACUITY_SCORE: 11

## 2021-02-16 NOTE — ED NOTES
LUQ pain, nausea, vomiting that started this afternoon.  Patient has history of pancreatitis.  Patient last drank alcohol last weekend.  Patient denies pain with urination.  Regular bowel movements.

## 2021-02-16 NOTE — PLAN OF CARE
[facility]    Patient admitted to room 2310 at approximately 0045 via cart from emergency room. Patient was accompanied by transport tech.     Verbal SBAR report received from cathy prior to patient arrival.     Patient ambulated to bed independently. Patient alert and oriented X 3. Pain is controlled with current analgesics.  Medication(s) being used: narcotic analgesics including dilaudid.  . Admission vital signs: Blood pressure (!) 164/85, pulse 89, temperature 96.2  F (35.7  C), temperature source Oral, resp. rate 20, weight 90.7 kg (200 lb), SpO2 94 %. Patient was oriented to plan of care, call light, bed controls, tv, telephone, bathroom, and visiting hours.     Risk Assessment    The following safety risks were identified during admission: none. Yellow risk band applied: NO.     Skin Initial Assessment    This writer admitted this patient and completed a full skin assessment and Romain score in the Adult PCS flowsheet. Appropriate interventions initiated as needed.     Secondary skin check completed by pt declined full skin assessment.    Romain Risk Assessment  Sensory Perception: 4-->no impairment  Moisture: 4-->rarely moist  Activity: 3-->walks occasionally  Mobility: 4-->no limitation  Nutrition: 3-->adequate  Friction and Shear: 3-->no apparent problem  Romain Score: 21  Mattress: Standard Hospital Mattress (Foam)  Bed Frame: Standard width and length    Education    Patient has a [place] to Observation order: No  Observation education completed and documented: Yakelin Sotelo RN

## 2021-02-16 NOTE — CONSULTS
Care Management Initial Consult    General Information  Assessment completed with: Patient, Spouse, Mary Jo  Type of CM/SW Visit: CM Role Introduction    Primary Care Provider verified and updated as needed: Yes   Readmission within the last 30 days: no previous admission in last 30 days         Advance Care Planning: Advance Care Planning Reviewed: no concerns identified          Communication Assessment  Patient's communication style: spoken language (English or Bilingual)    Hearing Difficulty or Deaf: no   Wear Glasses or Blind: yes    Cognitive  Cognitive/Neuro/Behavioral: WDL                      Living Environment:   People in home: spouse  Amina      Able to return to prior arrangements: yes       Family/Social Support:  Care provided by: self  Provides care for: no one  Marital Status:   Wife  Amina       Description of Support System: Supportive, Involved    Support Assessment: Adequate family and caregiver support    Current Resources:   Patient receiving home care services: No     Community Resources: None  Equipment currently used at home: none  Supplies currently used at home: None       Financial Concerns: No concerns identified           Lifestyle & Psychosocial Needs:     Social Needs     Financial resource strain: Not hard at all     Food insecurity     Worry: Never true     Inability: Never true     Transportation needs     Medical: No     Non-medical: No     Socioeconomic History     Marital status:      Spouse name: Lily     Number of children: Not on file     Years of education: Not on file     Highest education level: Not on file   Occupational History     Occupation: [employer]     Comment: 20 hr/week monday-Thur 7-noon     Occupation: [place]      Employer: [employer]     Occupation: RETIRED     Tobacco Use     Smoking status: Former Smoker     Packs/day: 1.00     Years: 40.00     Pack years: 40.00     Types: Cigarettes     Quit  "date: 2006     Years since quittin.2     Smokeless tobacco: Never Used   Substance and Sexual Activity     Alcohol use: Yes     Frequency: 4 or more times a week     Drinks per session: 3 or 4     Binge frequency: Weekly     Sexual activity: Yes     Partners: Female     Mental Health Status:  Mental Health Status: No Current Concerns       Chemical Dependency Status:  Chemical Dependency Status: Current Concern          Additional Information:    Consult received for an elevated risk for readmission (34%).  Met with the patient and his wife Amina.  Introduced myself and role.    The patient lives with his wife independently in the community.  He is admitted with acute on chronic pancreatitis due to alcohol.  Patient reports he drinks \"a little\".  Offered CD Resources, Patient declined.  He is not interested in CD treatment.  There are no discharge needs identified.    Plan:  Home      Martha Salguero RN        "

## 2021-02-16 NOTE — PROGRESS NOTES
[facility]      Subjective:  Pain is somewhat better  Vomited after clears this am  No fever  Pain luq similar to previous  Had three beers both Sat and Sun  Reports no previous withdrawal phenomena  No tremor    Review of Systems:  CONSTITUTIONAL: NEGATIVE for fever, chills, change in weight  INTEGUMENTARY/SKIN: NEGATIVE for worrisome rashes, moles or lesions  EYES: NEGATIVE for vision changes or irritation  ENT/MOUTH: NEGATIVE for ear, mouth and throat problems  RESP: NEGATIVE for significant cough or SOB  BREAST: NEGATIVE for masses, tenderness or discharge  CV: NEGATIVE for chest pain, palpitations or peripheral edema  GI: abd pain, stool 2/15/21 , emesis this am  : NEGATIVE for frequency, dysuria, or hematuria  MUSCULOSKELETAL: NEGATIVE for significant arthralgias or myalgia  NEURO: NEGATIVE for weakness, dizziness or paresthesias  ENDOCRINE: NEGATIVE for temperature intolerance, skin/hair changes  HEME: NEGATIVE for bleeding problems  PSYCHIATRIC: NEGATIVE for changes in mood or affect    Physical Exam:  Vitals Were Reviewed    Patient Vitals for the past 16 hrs:   BP Temp Temp src Pulse Resp SpO2 Weight   02/16/21 0704 (!) 186/95 98.4  F (36.9  C) Oral 65 16 92 % --   02/16/21 0415 (!) 157/55 97.6  F (36.4  C) Oral 83 18 92 % --   02/16/21 0100 (!) 164/85 96.2  F (35.7  C) Oral 89 -- 94 % --   02/16/21 0030 (!) 180/106 -- -- 98 -- 93 % --   02/16/21 0000 (!) 183/110 -- -- 97 -- 93 % --   02/15/21 2300 (!) 162/113 -- -- 102 -- 93 % --   02/15/21 2236 (!) 147/83 97.5  F (36.4  C) Oral -- 20 93 % 90.7 kg (200 lb)         Intake/Output Summary (Last 24 hours) at 2/16/2021 0856  Last data filed at 2/16/2021 0607  Gross per 24 hour   Intake 715 ml   Output 400 ml   Net 315 ml       GENERAL APPEARANCE: healthy, alert and no distress  EYES: conjunctiva clear, eyes grossly normal  RESP: lungs clear to auscultation - no rales, rhonchi or wheezes  CV: regular rate and rhythm, normal S1 S2, no  S3 or S4 and no murmur, click or rub   ABDOMEN: surgical scars, bs normal , tender epigastrium to luq  MS: no clubbing, cyanosis; no edema  SKIN: clear without significant rashes or lesions    Lab:  Recent Labs   Lab Test 02/16/21  0410 02/15/21  2252    137   POTASSIUM 4.7 4.3   CHLORIDE 105 103   CO2 31 28   ANIONGAP 4 6   * 171*   BUN 16 19   CR 0.82 0.94   NILSON 8.3* 9.3     CBC RESULTS:   Recent Labs   Lab Test 02/16/21 0410 02/15/21  2252   WBC 11.8* 12.5*   RBC 4.55 4.85   HGB 14.5 15.7   HCT 45.1 47.1    242       Results for orders placed or performed during the hospital encounter of 02/15/21 (from the past 24 hour(s))   CBC with platelets differential   Result Value Ref Range    WBC 12.5 (H) 4.0 - 11.0 10e9/L    RBC Count 4.85 4.4 - 5.9 10e12/L    Hemoglobin 15.7 13.3 - 17.7 g/dL    Hematocrit 47.1 40.0 - 53.0 %    MCV 97 78 - 100 fl    MCH 32.4 26.5 - 33.0 pg    MCHC 33.3 31.5 - 36.5 g/dL    RDW 16.0 (H) 10.0 - 15.0 %    Platelet Count 242 150 - 450 10e9/L    Diff Method Manual Differential     % Neutrophils 72.0 %    % Lymphocytes 20.0 %    % Monocytes 7.0 %    % Eosinophils 1.0 %    % Basophils 0.0 %    Absolute Neutrophil 9.0 (H) 1.6 - 8.3 10e9/L    Absolute Lymphocytes 2.5 0.8 - 5.3 10e9/L    Absolute Monocytes 0.9 0.0 - 1.3 10e9/L    Absolute Eosinophils 0.1 0.0 - 0.7 10e9/L    Absolute Basophils 0.0 0.0 - 0.2 10e9/L    Anisocytosis Slight     Poikilocytosis Slight     Reactive Lymphs Present     Platelet Estimate Confirming automated cell count    Comprehensive metabolic panel   Result Value Ref Range    Sodium 137 133 - 144 mmol/L    Potassium 4.3 3.4 - 5.3 mmol/L    Chloride 103 94 - 109 mmol/L    Carbon Dioxide 28 20 - 32 mmol/L    Anion Gap 6 3 - 14 mmol/L    Glucose 171 (H) 70 - 99 mg/dL    Urea Nitrogen 19 7 - 30 mg/dL    Creatinine 0.94 0.66 - 1.25 mg/dL    GFR Estimate 81 >60 mL/min/[1.73_m2]    GFR Estimate If Black >90 >60 mL/min/[1.73_m2]    Calcium 9.3 8.5 - 10.1 mg/dL     Bilirubin Total 0.7 0.2 - 1.3 mg/dL    Albumin 3.4 3.4 - 5.0 g/dL    Protein Total 7.4 6.8 - 8.8 g/dL    Alkaline Phosphatase 57 40 - 150 U/L    ALT 26 0 - 70 U/L    AST 21 0 - 45 U/L   Lactic acid whole blood   Result Value Ref Range    Lactic Acid 1.4 0.7 - 2.0 mmol/L   INR   Result Value Ref Range    INR 2.19 (H) 0.86 - 1.14   Lipase   Result Value Ref Range    Lipase 1,217 (H) 73 - 393 U/L   Asymptomatic SARS-CoV-2 COVID-19 Virus (Coronavirus) by PCR    Specimen: Nasopharyngeal   Result Value Ref Range    SARS-CoV-2 Virus Specimen Source Nasopharyngeal     SARS-CoV-2 PCR Result NEGATIVE     SARS-CoV-2 PCR Comment (Note)    Glucose by meter   Result Value Ref Range    Glucose 163 (H) 70 - 99 mg/dL   CBC with platelets   Result Value Ref Range    WBC 11.8 (H) 4.0 - 11.0 10e9/L    RBC Count 4.55 4.4 - 5.9 10e12/L    Hemoglobin 14.5 13.3 - 17.7 g/dL    Hematocrit 45.1 40.0 - 53.0 %    MCV 99 78 - 100 fl    MCH 31.9 26.5 - 33.0 pg    MCHC 32.2 31.5 - 36.5 g/dL    RDW 16.2 (H) 10.0 - 15.0 %    Platelet Count 205 150 - 450 10e9/L   Basic metabolic panel   Result Value Ref Range    Sodium 140 133 - 144 mmol/L    Potassium 4.7 3.4 - 5.3 mmol/L    Chloride 105 94 - 109 mmol/L    Carbon Dioxide 31 20 - 32 mmol/L    Anion Gap 4 3 - 14 mmol/L    Glucose 168 (H) 70 - 99 mg/dL    Urea Nitrogen 16 7 - 30 mg/dL    Creatinine 0.82 0.66 - 1.25 mg/dL    GFR Estimate 87 >60 mL/min/[1.73_m2]    GFR Estimate If Black >90 >60 mL/min/[1.73_m2]    Calcium 8.3 (L) 8.5 - 10.1 mg/dL   Lipase   Result Value Ref Range    Lipase 890 (H) 73 - 393 U/L   Magnesium   Result Value Ref Range    Magnesium 1.3 (L) 1.6 - 2.3 mg/dL   Phosphorus   Result Value Ref Range    Phosphorus 3.2 2.5 - 4.5 mg/dL   Glucose by meter   Result Value Ref Range    Glucose 160 (H) 70 - 99 mg/dL   Glucose by meter   Result Value Ref Range    Glucose 149 (H) 70 - 99 mg/dL       Assessment and Plan:    Acute recurrent pancreatitis-probably etoh related  Alcohol  abuse  History of distal pancreatectomy for IPMN complicated by pancreatic duct leak  History of pseudocyst  History of pancreatic biliary sphincterotomy March 2019-followed by severe pancreatitis and patric       pancreatic necrosis   On set of luq pain similar to previous for 12 hours. Recent etoh intake, vomiting pta.  Feels somewhat better  Lipase 1217--890  Wbc 12.5--11.8  On LR at 150  On vits and CIWA  No overt withdrawal other than some htn.      Hypertension  Continue lisinopril and metoprolol  On scheduled clonidine  bp high   Will add prn iv labetalol    Peripheral vascular disease    History of stomach wall prominence on CT  Needs H&P is from a goal are worse than the ER notes airway less  Continue iv pepcid    Hypomagnesemia  Mag 1.3-- replace.    Coronary artery disease    Diabetes mellitus type 2   Continue NPH insulin 30 4 in the AM and 30 2 in the PM  Insulin order set with sliding scale insulin.    Asplenic    Chronic atrial fibrillation with long-term anticoagulation  Continue coumadin.    BPH  Continue Flomax    Hyperlipidemia  Continue Crestor    Prophylaxis-coumadin  Code -full    Plan-continue iv fluids, pain meds, clears (take sparingly), follow, ciwa.

## 2021-02-16 NOTE — ED PROVIDER NOTES
History     Chief Complaint   Patient presents with     Abdominal Pain     LUQ, feels like previous pancreatitis     HPI  Antoine Russo is a 75 year old male with a history of recurrent pancreatitis related to alcohol use and acute gastritis as well as a history of atrial fibrillation on warfarin status post splenectomy and partial pancreatectomy as well as partial colectomy who presents with left upper quadrant abdominal pain.  Symptoms have been waxing and waning over the past 12 hours, steadily getting worse through the day.  He reports several episodes of nonbloody nonbilious emesis with continued nausea.  Pain is sharp and nonradiating, feels like his pancreatitis.  Currently pain is mild.  He has not take anything for symptoms.  He did drink alcohol over the weekend.  He denies fever, chills, cough, chest pain, diarrhea, dysuria, urinary frequency, or rash.  He says that the pain has waxed and waned through the day but he is not sure what makes it better or worse.    Allergies:  Allergies   Allergen Reactions     Nkda [No Known Drug Allergies]        Problem List:    Patient Active Problem List    Diagnosis Date Noted     Anticipated difficulty with intubation 05/23/2017     Priority: High     Class: Chronic     Difficult two hands mask ventilation, intubated multiple times asleep with video laryngoscope. H/o tongue cancer surgery.        Acute recurrent pancreatitis 10/17/2020     Priority: Medium     Acute gastritis without hemorrhage, unspecified gastritis type 10/17/2020     Priority: Medium     Added automatically from request for surgery [id number]       Acute gastritis 10/03/2020     Priority: Medium     Peripheral polyneuropathy 05/12/2020     Priority: Medium     Chronic low back pain with sciatica, sciatica laterality unspecified, unspecified back pain laterality 05/12/2020     Priority: Medium     Alcohol dependence, uncomplicated (H) 02/04/2020     Priority: Medium     Acute right-sided low  back pain with left-sided sciatica 02/04/2020     Priority: Medium     Spinal stenosis of lumbar region, unspecified whether neurogenic claudication present 02/04/2020     Priority: Medium     Pancreatic pseudocyst 04/18/2019     Priority: Medium     Acute pancreatitis 10/21/2018     Priority: Medium     Long term current use of anticoagulant therapy 04/05/2018     Priority: Medium     Recurrent pancreatitis 03/30/2018     Priority: Medium     Chronic atrial fibrillation (H) 01/23/2018     Priority: Medium     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 Please refer if Care Coordination services are needed.    Presenting Problem Signs and Symptoms Treatment Plan   Questions or concerns   during clinic hours   I will call my clinic directly:  [address] [address]  [phone].   Questions or concerns outside clinic hours   I will call the 24 hour nurse line at   [phone] or [address] [place] [address].   Need to schedule an appointment   I will call the 24 hour scheduling team at [phone] or my clinic directly at [phone].   Same day treatment     I will call my clinic first, nurse line if after hours, urgent care and express care if needed.   Clinic care coordination services (regular clinic hours)   I will call a clinic care coordinator directly:     Shelia Emanuel RN [facility]  [phone]    JAY Estes:    [phone]    Or call my clinic at  [phone] and ask to speak with care coordination.   Crisis Services: Behavioral or Mental Health  [facility] 24 Hour Phone Line  [phone]    [facility] 24 Hour Crisis Services  [phone]    [facility] (Behavioral Health Providers) [facility] [phone]    [facility]   [phone]     Emergency treatment -- Immediately    [facility] 911                Clostridium difficile enterocolitis 12/18/2012     Priority: Medium     Groin fluid collection 12/15/2012     Priority: Medium     CT 12/12- New (since 5/11) collection measuring at least 2.3 cm in diameter and 6.5 cm in length within the right inguinal canal. 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 Gerson Ravi and collekapil at the  in 2012  NAD 2017  CT scan of the neck every 2-3 years.  - Thyroid labs yearly.  - Carotid ultrasound in three to four years to evaluate for stenosis.       Hyperlipidemia LDL goal <100 10/31/2010     Priority: Medium     CAD (coronary artery disease) 05/26/2009     Priority: Medium     Stress testing 2009 showed inferior wall ischemia.  Cath preformed; identified moderate CAD with stenosis of 40-50% in LAD and RCA.  No stents were placed.  Echo in 01/2018 (done while in Afib with rates of 100-110); EF of 55-60%.  No RWMA.       GERD (gastroesophageal reflux disease) 05/26/2009     Priority: Medium     Hypertrophy of breast 09/25/2007     Priority: Medium     PERS HX TOBACCO USE - quit in 11/06 with chantix 03/15/2007     Priority: Medium     Hypertension, benign essential, goal below 140/90 11/07/2005     Priority: Medium     Patient has  only fair bp control and with family history of diabetes will all ace if lab indicated also has bph and may op for psa and not digital exam next yr        Pain in joint, shoulder region 11/07/2005     Priority: Medium     Hypertrophy of prostate without urinary obstruction 11/07/2005     Priority: Medium     Problem list name updated by automated process. 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 difficile colitis      Colon polyp      Colon polyp 8/26/2011     Coronary artery disease      Diabetes mellitus (H)      Diverticulitis      Diverticulitis of colon 7/17/2007     Hypertension      Leukocytosis 4/17/2019     Malignant neoplasm (H)      Noninfectious ileitis      Recurrent pancreatitis        Past Surgical History:    Past Surgical History:   Procedure Laterality Date     BREAST SURGERY  2008    right breast mass benign     COLONOSCOPY      multiple polyps removed     COLONOSCOPY  8/24/2011    Procedure:COMBINED COLONOSCOPY, REMOVE TUMOR/POLYP/LESION BY SNARE; Surgeon:MILEY ARBOLEDA; Location:[facility]     COLONOSCOPY  12/17/2012    Procedure: COLONOSCOPY;;  Surgeon: Leon Maurer MD;  Location: [facility]     COLONOSCOPY  12/18/2012    Procedure: COLONOSCOPY;;  Surgeon: Leon Maurer MD;  [facility]     DENERVATION OF SPERMATIC CORD MICROSURGICAL Left 5/23/2017    Procedure: DENERVATION OF SPERMATIC CORD MICROSURGICAL;;  Surgeon: Marcio Aggarwal MD;  [facility]     DISSECTION RADICAL NECK BILATERAL  8/2/2012    Procedure: DISSECTION RADICAL NECK BILATERAL;;  Surgeon: Yung Alvares MD;  [facility]     ENDOSCOPIC RETROGRADE CHOLANGIOPANCREATOGRAM N/A 5/10/2016    Procedure: COMBINED ENDOSCOPIC RETROGRADE CHOLANGIOPANCREATOGRAPHY,  PLACE TUBE/STENT;  Surgeon: Yovanny Beasley MD;  [facility]     ENDOSCOPIC RETROGRADE CHOLANGIOPANCREATOGRAM N/A 3/29/2018    Procedure: ENDOSCOPIC RETROGRADE CHOLANGIOPANCREATOGRAM;  Endoscopic Retrograde Cholangiopancreatogram, Endoscopic Ultrasound, Biliary Sphincterotomy, Biliary and Pancreatic Stent Placement;  Surgeon: Yovanny Beasley MD;  [facility]     ENDOSCOPIC ULTRASOUND UPPER GASTROINTESTINAL TRACT (GI) N/A 2/3/2016    Procedure: ENDOSCOPIC ULTRASOUND, ESOPHAGOSCOPY / UPPER GASTROINTESTINAL TRACT (GI);  Surgeon: Grabiel Plata MD;  [facility]     ENDOSCOPIC ULTRASOUND UPPER GASTROINTESTINAL TRACT (GI) N/A 3/29/2018    Procedure: ENDOSCOPIC ULTRASOUND, ESOPHAGOSCOPY / UPPER GASTROINTESTINAL TRACT (GI);;  Surgeon: Grabiel Plata MD;  [facility]     ESOPHAGOSCOPY, GASTROSCOPY, DUODENOSCOPY (EGD), COMBINED N/A 2/3/2016    Procedure: COMBINED ENDOSCOPIC ULTRASOUND, ESOPHAGOSCOPY, GASTROSCOPY, DUODENOSCOPY (EGD), FINE NEEDLE ASPIRATE/BIOPSY;  Surgeon: Grabiel Plata MD;  [facility]     ESOPHAGOSCOPY, GASTROSCOPY, DUODENOSCOPY (EGD), COMBINED N/A 6/8/2016    Procedure: COMBINED ESOPHAGOSCOPY, GASTROSCOPY, DUODENOSCOPY (EGD), REMOVE FOREIGN BODY;  Surgeon: Yovanny Beasley MD;  [facility]     ESOPHAGOSCOPY, GASTROSCOPY, DUODENOSCOPY (EGD), COMBINED N/A 4/17/2018    Procedure: COMBINED ESOPHAGOSCOPY, GASTROSCOPY, DUODENOSCOPY (EGD), REMOVE FOREIGN BODY;  EGD with stent removal;  Surgeon: Grabiel Plata MD;  [facility]     EXCISE LESION INTRAORAL  6/14/2012    Procedure: EXCISE LESION INTRAORAL;  Wide Local Excision Floor of Mouth, Direct Laryngoscopy, Bilateral Coker's Marsuplization, Split Thickness Skin Graft from right Thigh  Latex Safe;  Surgeon: Gerson Ravi MD;  [facility]     EXCISE LESION INTRAORAL  8/2/2012    Procedure: EXCISE LESION INTRAORAL;  Floor of Mouth Resection, Bilateral Selective Radical Neck Dissection,  Tracheostomy, Left Radial Forearm  Free Flap with Alloderm, Nasogastric Feeding Tube Placement,    * Latex Safe*;  Surgeon: Gerson Ravi MD;  [facility]     EXCISE LESION INTRAORAL  2012    Procedure: EXCISE LESION INTRAORAL;  takedown of oral flap;  Surgeon: Yung Alvares MD;  [facility]     GRAFT FREE VASCULARIZED (LOCATION)  2012    Procedure: GRAFT FREE VASCULARIZED (LOCATION);;  Surgeon: Yung Alvares MD;  [facility]     GRAFT SKIN SPLIT THICKNESS FROM EXTREMITY  2012    Procedure: GRAFT SKIN SPLIT THICKNESS FROM EXTREMITY;;  Surgeon: Gerson Ravi MD;  [facility]     LAPAROSCOPIC ILEOSTOMY TAKEDOWN  2013    Procedure: LAPAROSCOPIC ILEOSTOMY TAKEDOWN;  Laparoscopic Closure of Enterostomy, Guerda's Type with IleoRectal Anastomosis ;  Surgeon: Grabiel Riddle MD;  [facility]     LAPAROTOMY EXPLORATORY  2012    Procedure: LAPAROTOMY EXPLORATORY;  Exploratory Laparotomy, total abdominal colectomy, ileostomy formation;  Surgeon: Miquel Cannon MD;  [facility]     LARYNGOSCOPY  2012    Procedure: LARYNGOSCOPY;;  Surgeon: Gerson Ravi MD;  [facility]     ORTHOPEDIC SURGERY      ganglian cyst left ankle     PANCREATECTOMY, SPLENECTOMY N/A 3/10/2016    Procedure: PANCREATECTOMY, SPLENECTOMY;  Surgeon: Nael Abel MD;  [facility]     SHOULDER SURGERY  ,     2006- right rotator cuff, 2008 bone spur on left. Dr. Hdez     VARICOCELECTOMY Left 2017    Procedure: VARICOCELECTOMY;  Left Varicocele Repair, Denervation of Left Testis;  Surgeon: Marcio Aggarwal MD;  [facility]       Family History:    Family History   Problem Relation Age of Onset     Diabetes Sister         onset age 50     Alzheimer Disease Mother          80     Alzheimer Disease Father          85     Diabetes Other         nephew type 1     Diabetes Other      Aneurysm Sister      Anesthesia Reaction No family hx of      Colon  Cancer No family hx of      Colon Polyps No family hx of      Crohn's Disease No family hx of      Ulcerative Colitis No family hx of        Social History:  Marital Status:   [2]  Social History     Tobacco Use     Smoking status: Former Smoker     Packs/day: 1.00     Years: 40.00     Pack years: 40.00     Types: Cigarettes     Quit date: 2006     Years since quittin.2     Smokeless tobacco: Never Used   Substance Use Topics     Alcohol use: Yes     Frequency: 4 or more times a week     Drinks per session: 3 or 4     Binge frequency: Weekly     Drug use: Not on file        Medications:    No current outpatient medications on file.        Review of Systems  Pertinent positives and negatives listed in the HPI, all other systems reviewed and are negative.    Physical Exam   BP: (!) 147/83  Pulse: 102  Temp: 97.5  F (36.4  C)  Resp: 20  Weight: 90.7 kg (200 lb)  SpO2: 93 %      Physical Exam  Vitals signs and nursing note reviewed.   Constitutional:       General: He is in acute distress.      Appearance: He is well-developed. He is not diaphoretic.   HENT:      Head: Normocephalic and atraumatic.      Right Ear: External ear normal.      Left Ear: External ear normal.      Nose: Nose normal.   Eyes:      General: No scleral icterus.     Conjunctiva/sclera: Conjunctivae normal.   Neck:      Musculoskeletal: Normal range of motion.   Cardiovascular:      Rate and Rhythm: Normal rate and regular rhythm.   Pulmonary:      Effort: Pulmonary effort is normal. No respiratory distress.      Breath sounds: No stridor.   Abdominal:      General: There is no distension.      Palpations: Abdomen is soft.      Tenderness: There is abdominal tenderness in the left upper quadrant. There is no guarding or rebound.   Skin:     General: Skin is warm and dry.   Neurological:      Mental Status: He is alert and oriented to person, place, and time.   Psychiatric:         Behavior: Behavior normal.         ED Course         Procedures               Critical Care time:  none               Results for orders placed or performed during the hospital encounter of 02/15/21 (from the past 24 hour(s))   CBC with platelets differential   Result Value Ref Range    WBC 12.5 (H) 4.0 - 11.0 10e9/L    RBC Count 4.85 4.4 - 5.9 10e12/L    Hemoglobin 15.7 13.3 - 17.7 g/dL    Hematocrit 47.1 40.0 - 53.0 %    MCV 97 78 - 100 fl    MCH 32.4 26.5 - 33.0 pg    MCHC 33.3 31.5 - 36.5 g/dL    RDW 16.0 (H) 10.0 - 15.0 %    Platelet Count 242 150 - 450 10e9/L    Diff Method Manual Differential     % Neutrophils 72.0 %    % Lymphocytes 20.0 %    % Monocytes 7.0 %    % Eosinophils 1.0 %    % Basophils 0.0 %    Absolute Neutrophil 9.0 (H) 1.6 - 8.3 10e9/L    Absolute Lymphocytes 2.5 0.8 - 5.3 10e9/L    Absolute Monocytes 0.9 0.0 - 1.3 10e9/L    Absolute Eosinophils 0.1 0.0 - 0.7 10e9/L    Absolute Basophils 0.0 0.0 - 0.2 10e9/L    Anisocytosis Slight     Poikilocytosis Slight     Reactive Lymphs Present     Platelet Estimate Confirming automated cell count    Comprehensive metabolic panel   Result Value Ref Range    Sodium 137 133 - 144 mmol/L    Potassium 4.3 3.4 - 5.3 mmol/L    Chloride 103 94 - 109 mmol/L    Carbon Dioxide 28 20 - 32 mmol/L    Anion Gap 6 3 - 14 mmol/L    Glucose 171 (H) 70 - 99 mg/dL    Urea Nitrogen 19 7 - 30 mg/dL    Creatinine 0.94 0.66 - 1.25 mg/dL    GFR Estimate 81 >60 mL/min/[1.73_m2]    GFR Estimate If Black >90 >60 mL/min/[1.73_m2]    Calcium 9.3 8.5 - 10.1 mg/dL    Bilirubin Total 0.7 0.2 - 1.3 mg/dL    Albumin 3.4 3.4 - 5.0 g/dL    Protein Total 7.4 6.8 - 8.8 g/dL    Alkaline Phosphatase 57 40 - 150 U/L    ALT 26 0 - 70 U/L    AST 21 0 - 45 U/L   Lactic acid whole blood   Result Value Ref Range    Lactic Acid 1.4 0.7 - 2.0 mmol/L   INR   Result Value Ref Range    INR 2.19 (H) 0.86 - 1.14   Lipase   Result Value Ref Range    Lipase 1,217 (H) 73 - 393 U/L   Asymptomatic SARS-CoV-2 COVID-19 Virus (Coronavirus) by PCR    Specimen:  Nasopharyngeal   Result Value Ref Range    SARS-CoV-2 Virus Specimen Source Nasopharyngeal     SARS-CoV-2 PCR Result NEGATIVE     SARS-CoV-2 PCR Comment (Note)    Glucose by meter   Result Value Ref Range    Glucose 163 (H) 70 - 99 mg/dL           Assessments & Plan (with Medical Decision Making)   75-year-old male with a history of recurrent pancreatitis related to alcohol use, gastritis, prior splenectomy, prior partial colectomy, and prior partial pancreatectomy who presents with left upper quadrant abdominal pain and nausea and vomiting similar to prior episodes of pancreatitis.  Temperature is 36.4  C, SPO2 is 93% on room air, blood pressure is 147/83.  He is given IV fluids, ondansetron, and famotidine.  Electrolytes are within normal limits.  LFTs are normal, no signs of hepatitis.  Lactic acid is normal which is reassuring.  INR is therapeutic at 2.19.  White blood cell count is mildly elevated 12.5 and hemoglobin is 15.7, no signs of anemia.  His lipase is elevated at 1217, consistent with acute on chronic pancreatitis.  On recheck he is still uncomfortable, not tolerating oral intake, and is best treated by observation in the hospital.  Therefore I discussed the case with the on-call hospitalist Nahomi DEMPSEY who agrees to admit the patient to her service.    I have reviewed the nursing notes.    I have reviewed the findings, diagnosis, plan and need for follow up with the patient.       Current Discharge Medication List          Final diagnoses:   Alcohol-induced acute pancreatitis without infection or necrosis       2/15/2021   [facility] EMERGENCY DEPT     Michael Turcios MD  02/16/21 [id number]

## 2021-02-16 NOTE — PLAN OF CARE
Remains NPO, minimal abdomen discomfort with emesis x 1, relief with Antiemetic.  Cooperative with cares, continue to monitor labs and receive IV fluids.

## 2021-02-16 NOTE — H&P
[facility]    History and Physical - Hospitalist Service       Date of Admission:  2/15/2021    Assessment & Plan   Antoine Russo is a 75 year old male admitted on 2/15/2021 for acute on chronic pancreatitis in the setting of ongoing alcohol abuse.  Acute on chronic pancreatitis in the setting of ongoing alcohol abuse.  Admit the patient to medical floor.  Aggressive IVF and NPO.  Pain control.    Leukocytosis.  Likely secondary to above.  No sign of infection.  Monitor for sign of infection.    Dehydration.  IVF    Alcohol abuse.  Last drink over the weekend.  Monitor for alcohol withdrawal with CIWA.  The patient has been counseled.    Nausea and vomiting.  IVF and prn IV antiemetics.    Afib on coumadin.  Continue home metoprolol/coumadin    DM.  subcutaneous insulin and monitor glucose    DVT PPx Coumadin    GI PPx antiacid    Code status full code    Disposition home in 2-3 days     Xander Valdez MD 02/16/2021, 3:40 AM     The patient's care was discussed with the Bedside Nurse.    Xander Valdez MD  [facility]  Contact information available via [facility] Paging/Directory      ______________________________________________________________________    Chief Complaint   Abdominal pain    History is obtained from the patient    History of Present Illness   Antoine Russo is a 75 year old male with PMHx of recurrent pancreatitis, alcohol abuse, gastritis, afib on coumadin, splenectomy, partial pancreatectomy/colectomy, HTN, CAD and DM presents with complaint of abdominal pain.  Per the patient's report, the patient has been abusing alcohol over the past weekend.  Last night, the patient started to noticed increasing abdominal pain.  The patient describes his pain as upper left quadrant, sharp, intermittent, severe and similar to his previous bouts of pancreatitis.  The patient otherwise denies any fever, chills, chest pain, shortness of breath or diarrhea.   The patient admits to have some nausea and non-bloody vomiting.      In our ER, the patient was hemodynamically stable and afebrile.  However, The patient has elevated lipase and mild leukocytosis.  Our ER physician request to admit for acute on chronic pancreatitis.  Review of Systems    The 10 point Review of Systems is negative other than noted in the HPI or here.     Past Medical History    I have reviewed this patient's medical history and updated it with pertinent information if needed.   Past Medical History:   Diagnosis Date     Acute kidney injury (H) 4/17/2019     Acute on chronic pancreatitis (H) 1/23/2018     Bacteriuria with pyuria 4/17/2019     C. difficile colitis      Colon polyp      Colon polyp 8/26/2011    Colonoscopy 8/2011-A sessile polyp was found in the cecum. The polyp was 6 mm in size. The polyp was removed with a hot snare. Resection and retrieval were complete. A sessile polyp was found in the proximal transverse colon. The polyp was 15 mm in size. The polyp was removed with a hot snare. Resection and retrieval were complete. A sessile polyp was found in the sigmoid colon. The polyp was 5 mm     Coronary artery disease      Diabetes mellitus (H)     type 2     Diverticulitis      Diverticulitis of colon 7/17/2007    Colitis on CT Scan 5/2011- [place] Colonoscopy 8/2011 Diverticulitis - Multiple small and large-mouthed diverticula were found in the mid sigmoid colon and at the hepatic flexure. There was narrowing of the colon in association with the diverticular opening. Nena-diverticular erythema was seen. There was evidence of an impacted diverticulum. 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  Past Surgical History:   Procedure Laterality Date     BREAST SURGERY  2008    right breast mass benign     COLONOSCOPY      multiple polyps removed     COLONOSCOPY  8/24/2011    Procedure:COMBINED COLONOSCOPY, REMOVE TUMOR/POLYP/LESION BY SNARE; Surgeon:MILEY ARBOLEDA; Location:[facility]     COLONOSCOPY  12/17/2012    Procedure: COLONOSCOPY;;  Surgeon: Leon Maurer MD;  [facility]     COLONOSCOPY  12/18/2012    Procedure: COLONOSCOPY;;  Surgeon: Leon Maurer MD;  [facility]     DENERVATION OF SPERMATIC CORD MICROSURGICAL Left 5/23/2017    Procedure: DENERVATION OF SPERMATIC CORD MICROSURGICAL;;  Surgeon: Marcio Aggarwal MD;  [facility]     DISSECTION RADICAL NECK BILATERAL  8/2/2012    Procedure: DISSECTION RADICAL NECK BILATERAL;;  Surgeon: Yung Alvares MD;  [facility]     ENDOSCOPIC RETROGRADE CHOLANGIOPANCREATOGRAM N/A 5/10/2016    Procedure: COMBINED ENDOSCOPIC RETROGRADE CHOLANGIOPANCREATOGRAPHY, PLACE TUBE/STENT;  Surgeon: Yovanny Beasley MD;  [facility]     ENDOSCOPIC RETROGRADE CHOLANGIOPANCREATOGRAM N/A 3/29/2018    Procedure: ENDOSCOPIC RETROGRADE CHOLANGIOPANCREATOGRAM;  Endoscopic Retrograde Cholangiopancreatogram, Endoscopic Ultrasound, Biliary Sphincterotomy, Biliary and Pancreatic Stent Placement;  Surgeon: Yovanny Beasley MD;  [facility]     ENDOSCOPIC ULTRASOUND UPPER GASTROINTESTINAL TRACT (GI) N/A 2/3/2016    Procedure: ENDOSCOPIC ULTRASOUND, ESOPHAGOSCOPY / UPPER GASTROINTESTINAL TRACT (GI);  Surgeon: Grabiel Plata MD;  [facility]     ENDOSCOPIC ULTRASOUND UPPER GASTROINTESTINAL TRACT (GI) N/A 3/29/2018    Procedure: ENDOSCOPIC ULTRASOUND, ESOPHAGOSCOPY / UPPER GASTROINTESTINAL TRACT (GI);;  Surgeon: Grabiel Plata MD;  [facility]     ESOPHAGOSCOPY, GASTROSCOPY, DUODENOSCOPY (EGD), COMBINED N/A 2/3/2016    Procedure: COMBINED ENDOSCOPIC ULTRASOUND, ESOPHAGOSCOPY, GASTROSCOPY, DUODENOSCOPY  (EGD), FINE NEEDLE ASPIRATE/BIOPSY;  Surgeon: Grabiel Plata MD;  [facility]     ESOPHAGOSCOPY, GASTROSCOPY, DUODENOSCOPY (EGD), COMBINED N/A 6/8/2016    Procedure: COMBINED ESOPHAGOSCOPY, GASTROSCOPY, DUODENOSCOPY (EGD), REMOVE FOREIGN BODY;  Surgeon: Yovanny Beasley MD;  [facility]     ESOPHAGOSCOPY, GASTROSCOPY, DUODENOSCOPY (EGD), COMBINED N/A 4/17/2018    Procedure: COMBINED ESOPHAGOSCOPY, GASTROSCOPY, DUODENOSCOPY (EGD), REMOVE FOREIGN BODY;  EGD with stent removal;  Surgeon: Grabiel Plata MD;  [facility]     EXCISE LESION INTRAORAL  6/14/2012    Procedure: EXCISE LESION INTRAORAL;  Wide Local Excision Floor of Mouth, Direct Laryngoscopy, Bilateral Omaha's Marsuplization, Split Thickness Skin Graft from right Thigh  Latex Safe;  Surgeon: Gerson Ravi MD;  [facility]     EXCISE LESION INTRAORAL  8/2/2012    Procedure: EXCISE LESION INTRAORAL;  Floor of Mouth Resection, Bilateral Selective Radical Neck Dissection, Tracheostomy, Left Radial Forearm  Free Flap with Alloderm, Nasogastric Feeding Tube Placement,    * Latex Safe*;  Surgeon: Gerson Ravi MD;  [facility]     EXCISE LESION INTRAORAL  12/11/2012    Procedure: EXCISE LESION INTRAORAL;  takedown of oral flap;  Surgeon: Yung Alvares MD;  [facility]     GRAFT FREE VASCULARIZED (LOCATION)  8/2/2012    Procedure: GRAFT FREE VASCULARIZED (LOCATION);;  Surgeon: Yung Alvares MD;  [facility]     GRAFT SKIN SPLIT THICKNESS FROM EXTREMITY  6/14/2012    Procedure: GRAFT SKIN SPLIT THICKNESS FROM EXTREMITY;;  Surgeon: Gerson Ravi MD;  [facility]     LAPAROSCOPIC ILEOSTOMY TAKEDOWN  6/6/2013    Procedure: LAPAROSCOPIC ILEOSTOMY TAKEDOWN;  Laparoscopic Closure of Enterostomy, Guerda's Type with IleoRectal Anastomosis ;  Surgeon: Grabiel Riddle MD;  [facility]     LAPAROTOMY EXPLORATORY  12/20/2012    Procedure: LAPAROTOMY EXPLORATORY;  Exploratory Laparotomy, total abdominal  colectomy, ileostomy formation;  Surgeon: Miquel Cannon MD;  [facility]     LARYNGOSCOPY  2012    Procedure: LARYNGOSCOPY;;  Surgeon: Gerson Ravi MD;  [facility]     ORTHOPEDIC SURGERY      ganglian cyst left ankle     PANCREATECTOMY, SPLENECTOMY N/A 3/10/2016    Procedure: PANCREATECTOMY, SPLENECTOMY;  Surgeon: Nael Abel MD;  [facility]     SHOULDER SURGERY  ,     2006- right rotator cuff,  bone spur on left. Dr. Hdez     VARICOCELECTOMY Left 2017    Procedure: VARICOCELECTOMY;  Left Varicocele Repair, Denervation of Left Testis;  Surgeon: Marcio Aggarwal MD;  [facility]       Social History   I have reviewed this patient's social history and updated it with pertinent information if needed.  Social History     Tobacco Use     Smoking status: Former Smoker     Packs/day: 1.00     Years: 40.00     Pack years: 40.00     Types: Cigarettes     Quit date: 2006     Years since quittin.2     Smokeless tobacco: Never Used   Substance Use Topics     Alcohol use: Yes     Frequency: 4 or more times a week     Drinks per session: 3 or 4     Binge frequency: Weekly     Drug use: Not on file       Family History   I have reviewed this patient's family history and updated it with pertinent information if needed.  Family History   Problem Relation Age of Onset     Diabetes Sister         onset age 50     Alzheimer Disease Mother          80     Alzheimer Disease Father          85     Diabetes Other         nephew type 1     Diabetes Other      Aneurysm Sister      Anesthesia Reaction No family hx of      Colon Cancer No family hx of      Colon Polyps No family hx of      Crohn's Disease No family hx of      Ulcerative Colitis No family hx of        Prior to Admission Medications   Prior to Admission Medications   Prescriptions Last Dose Informant Patient Reported? Taking?   Continuous Blood Gluc  (FREESTYLE LISA 14 DAY READER) JOSE ALBERTO   No No   Sig:  1 each every 14 days   Continuous Blood Gluc Sensor (FREESTYLE LISA 14 DAY SENSOR) MISC  Self No No   Si each every 14 days   insulin isophane human (NOVOLIN N FLEXPEN RELION) 100 UNIT/ML injection 2/15/2021 at Unknown time  No Yes   Sig: Take 34 units of N in the am and 32 units in the pm.   insulin pen needle (BD LUIS U/F) 32G X 4 MM miscellaneous  Self No No   Sig: USE PEN NEEDLE ONCE DAILY AS DIRECTED   lisinopril (ZESTRIL) 5 MG tablet 2/15/2021 at Unknown time  No Yes   Sig: Take 1 tablet by mouth once daily   metoprolol succinate ER (TOPROL-XL) 50 MG 24 hr tablet 2/15/2021 at 0800  No Yes   Sig: Take 1.5 tablets (75 mg) by mouth 2 times daily   multivitamin, therapeutic with minerals (THERA-VIT-M) TABS 2/15/2021 at Unknown time Self No Yes   Sig: Take 1 tablet by mouth daily.   pregabalin (LYRICA) 75 MG capsule 2/15/2021 at 0800 Self No Yes   Sig: Take 1 capsule (75 mg) by mouth 2 times daily   rosuvastatin (CRESTOR) 10 MG tablet 2/15/2021 at Unknown time  No Yes   Sig: Take 1 tablet by mouth once daily   tamsulosin (FLOMAX) 0.4 MG capsule 2/15/2021 at Unknown time Self No Yes   Sig: Take 1 capsule (0.4 mg) by mouth daily   vitamin B-12 (CYANOCOBALAMIN) 100 MCG tablet 2/15/2021 at Unknown time Self Yes Yes   Sig: Take 100 mcg by mouth daily   warfarin ANTICOAGULANT (COUMADIN) 2.5 MG tablet 2021  No No   Sig: Take 2.5mg (1 tab) Tues/Thurs/Sat; 1.25mg (1/2 tab) all other days or as directed by the Anticoagulation Clinic      Facility-Administered Medications: None     Allergies   Allergies   Allergen Reactions     Nkda [No Known Drug Allergies]        Physical Exam   Vital Signs: Temp: 96.2  F (35.7  C) Temp src: Oral BP: (!) 164/85 Pulse: 89   Resp: 20 SpO2: 94 % O2 Device: None (Room air)    Weight: 200 lbs 0 oz    GENERAL: The patient is not in any acute distressed. Awake and alert.  HEENT: Nonicteric sclerae, PERRLA, EOMI.  HEART: Regular rate and rhythm without murmurs. No lower extremities  "edema.  LUNGS: Clear to auscultation bilaterally. No wheezing, crackles or rhonchi  ABDOMEN: Soft, upper abdominal pain with tenderess but no rebound or guarding.  positive bowel sounds, nontender.  SKIN: No rash, no excessive bruising, petechiae, or purpura.  NEUROLOGIC: AxO x 3.  Cranial nerves II-XII intact without motor/sensory deficit.  Data   Data reviewed today: I reviewed all medications, new labs and imaging results over the last 24 hours.  Recent Labs   Lab 02/15/21  2252   WBC 12.5*   HGB 15.7   MCV 97      INR 2.19*      POTASSIUM 4.3   CHLORIDE 103   CO2 28   BUN 19   CR 0.94   ANIONGAP 6   NILSON 9.3   *   ALBUMIN 3.4   PROTTOTAL 7.4   BILITOTAL 0.7   ALKPHOS 57   ALT 26   AST 21   LIPASE 1,217*     No results found for this or any previous visit (from the past 24 hour(s)).     Telemed statement : The patient was evaluated via telemedicine and was in agreement with the plan.     The nurse was at the bedside during the entire encounter which took place virtually from  [place] .     The patient was evaluated at  [facility]\"      "

## 2021-02-16 NOTE — PHARMACY-ANTICOAGULATION SERVICE
Clinical Pharmacy - Warfarin Dosing Consult     Pharmacy has been consulted to manage this patient s warfarin therapy.  Indication: Atrial Fibrillation  Therapy Goal: INR 2-3  Provider/Team: Ro GUZMAN  Warfarin Prior to Admission: Yes  Warfarin PTA Regimen: 2.5mg Tues,Thurs,Sat; 1.25mg rest of week    INR   Date Value Ref Range Status   02/15/2021 2.19 (H) 0.86 - 1.14 Final   02/04/2021 2.00 (H) 0.86 - 1.14 Final     Comment:     This test is intended for monitoring Coumadin therapy.  Results are not   accurate in patients with prolonged INR due to factor deficiency.         Recommend home  warfarin 1.25 mg now as patient reports last dose taken 2/14..  Pharmacy will monitor Antoine Russo daily and order warfarin doses to achieve specified goal.      Please contact pharmacy as soon as possible if the warfarin needs to be held for a procedure or if the warfarin goals change.      Kayy Ramirez, PharmD

## 2021-02-17 LAB
ALBUMIN SERPL-MCNC: 2.7 G/DL (ref 3.4–5)
ALP SERPL-CCNC: 55 U/L (ref 40–150)
ALT SERPL W P-5'-P-CCNC: 19 U/L (ref 0–70)
ANION GAP SERPL CALCULATED.3IONS-SCNC: 3 MMOL/L (ref 3–14)
AST SERPL W P-5'-P-CCNC: 14 U/L (ref 0–45)
BILIRUB SERPL-MCNC: 0.8 MG/DL (ref 0.2–1.3)
BUN SERPL-MCNC: 13 MG/DL (ref 7–30)
CALCIUM SERPL-MCNC: 8.2 MG/DL (ref 8.5–10.1)
CHLORIDE SERPL-SCNC: 106 MMOL/L (ref 94–109)
CO2 SERPL-SCNC: 28 MMOL/L (ref 20–32)
CREAT SERPL-MCNC: 0.81 MG/DL (ref 0.66–1.25)
ERYTHROCYTE [DISTWIDTH] IN BLOOD BY AUTOMATED COUNT: 16.6 % (ref 10–15)
GFR SERPL CREATININE-BSD FRML MDRD: 88 ML/MIN/{1.73_M2}
GLUCOSE BLDC GLUCOMTR-MCNC: 133 MG/DL (ref 70–99)
GLUCOSE BLDC GLUCOMTR-MCNC: 135 MG/DL (ref 70–99)
GLUCOSE BLDC GLUCOMTR-MCNC: 140 MG/DL (ref 70–99)
GLUCOSE BLDC GLUCOMTR-MCNC: 157 MG/DL (ref 70–99)
GLUCOSE BLDC GLUCOMTR-MCNC: 165 MG/DL (ref 70–99)
GLUCOSE BLDC GLUCOMTR-MCNC: 169 MG/DL (ref 70–99)
GLUCOSE SERPL-MCNC: 164 MG/DL (ref 70–99)
HCT VFR BLD AUTO: 43.5 % (ref 40–53)
HGB BLD-MCNC: 13.9 G/DL (ref 13.3–17.7)
INR PPP: 2.36 (ref 0.86–1.14)
LIPASE SERPL-CCNC: 917 U/L (ref 73–393)
MAGNESIUM SERPL-MCNC: 1.9 MG/DL (ref 1.6–2.3)
MCH RBC QN AUTO: 32.2 PG (ref 26.5–33)
MCHC RBC AUTO-ENTMCNC: 32 G/DL (ref 31.5–36.5)
MCV RBC AUTO: 101 FL (ref 78–100)
PLATELET # BLD AUTO: 192 10E9/L (ref 150–450)
POTASSIUM SERPL-SCNC: 4.4 MMOL/L (ref 3.4–5.3)
PROT SERPL-MCNC: 6.2 G/DL (ref 6.8–8.8)
RBC # BLD AUTO: 4.32 10E12/L (ref 4.4–5.9)
SODIUM SERPL-SCNC: 137 MMOL/L (ref 133–144)
WBC # BLD AUTO: 9.6 10E9/L (ref 4–11)

## 2021-02-17 PROCEDURE — 80053 COMPREHEN METABOLIC PANEL: CPT | Performed by: PHYSICIAN ASSISTANT

## 2021-02-17 PROCEDURE — 250N000011 HC RX IP 250 OP 636: Performed by: EMERGENCY MEDICINE

## 2021-02-17 PROCEDURE — 36415 COLL VENOUS BLD VENIPUNCTURE: CPT | Performed by: PHYSICIAN ASSISTANT

## 2021-02-17 PROCEDURE — 250N000013 HC RX MED GY IP 250 OP 250 PS 637: Performed by: EMERGENCY MEDICINE

## 2021-02-17 PROCEDURE — 83735 ASSAY OF MAGNESIUM: CPT | Performed by: PHYSICIAN ASSISTANT

## 2021-02-17 PROCEDURE — 120N000001 HC R&B MED SURG/OB

## 2021-02-17 PROCEDURE — 83690 ASSAY OF LIPASE: CPT | Performed by: PHYSICIAN ASSISTANT

## 2021-02-17 PROCEDURE — 999N001017 HC STATISTIC GLUCOSE BY METER IP

## 2021-02-17 PROCEDURE — 250N000013 HC RX MED GY IP 250 OP 250 PS 637: Performed by: FAMILY MEDICINE

## 2021-02-17 PROCEDURE — 250N000013 HC RX MED GY IP 250 OP 250 PS 637: Performed by: PHYSICIAN ASSISTANT

## 2021-02-17 PROCEDURE — 85610 PROTHROMBIN TIME: CPT | Performed by: PHYSICIAN ASSISTANT

## 2021-02-17 PROCEDURE — 258N000003 HC RX IP 258 OP 636: Performed by: FAMILY MEDICINE

## 2021-02-17 PROCEDURE — 250N000013 HC RX MED GY IP 250 OP 250 PS 637: Performed by: INTERNAL MEDICINE

## 2021-02-17 PROCEDURE — 85027 COMPLETE CBC AUTOMATED: CPT | Performed by: PHYSICIAN ASSISTANT

## 2021-02-17 PROCEDURE — 258N000003 HC RX IP 258 OP 636: Performed by: PHYSICIAN ASSISTANT

## 2021-02-17 PROCEDURE — 99233 SBSQ HOSP IP/OBS HIGH 50: CPT | Performed by: FAMILY MEDICINE

## 2021-02-17 RX ADMIN — THIAMINE HCL TAB 100 MG 200 MG: 100 TAB at 13:29

## 2021-02-17 RX ADMIN — SODIUM CHLORIDE, POTASSIUM CHLORIDE, SODIUM LACTATE AND CALCIUM CHLORIDE: 600; 310; 30; 20 INJECTION, SOLUTION INTRAVENOUS at 19:47

## 2021-02-17 RX ADMIN — METOPROLOL TARTRATE 75 MG: 25 TABLET, FILM COATED ORAL at 19:47

## 2021-02-17 RX ADMIN — CLONIDINE HYDROCHLORIDE 0.1 MG: 0.1 TABLET ORAL at 08:20

## 2021-02-17 RX ADMIN — ONDANSETRON 4 MG: 2 INJECTION INTRAMUSCULAR; INTRAVENOUS at 00:10

## 2021-02-17 RX ADMIN — THIAMINE HCL TAB 100 MG 200 MG: 100 TAB at 08:20

## 2021-02-17 RX ADMIN — MULTIPLE VITAMINS W/ MINERALS TAB 1 TABLET: TAB at 08:20

## 2021-02-17 RX ADMIN — INSULIN ASPART 1 UNITS: 100 INJECTION, SOLUTION INTRAVENOUS; SUBCUTANEOUS at 04:46

## 2021-02-17 RX ADMIN — SODIUM CHLORIDE, POTASSIUM CHLORIDE, SODIUM LACTATE AND CALCIUM CHLORIDE: 600; 310; 30; 20 INJECTION, SOLUTION INTRAVENOUS at 06:21

## 2021-02-17 RX ADMIN — FAMOTIDINE 20 MG: 20 INJECTION, SOLUTION INTRAVENOUS at 11:10

## 2021-02-17 RX ADMIN — PROCHLORPERAZINE EDISYLATE 5 MG: 5 INJECTION INTRAMUSCULAR; INTRAVENOUS at 07:45

## 2021-02-17 RX ADMIN — ACETAMINOPHEN 650 MG: 325 TABLET, FILM COATED ORAL at 23:07

## 2021-02-17 RX ADMIN — INSULIN ASPART 1 UNITS: 100 INJECTION, SOLUTION INTRAVENOUS; SUBCUTANEOUS at 00:14

## 2021-02-17 RX ADMIN — PREGABALIN 75 MG: 75 CAPSULE ORAL at 19:47

## 2021-02-17 RX ADMIN — FOLIC ACID 1 MG: 1 TABLET ORAL at 08:20

## 2021-02-17 RX ADMIN — ONDANSETRON 4 MG: 2 INJECTION INTRAMUSCULAR; INTRAVENOUS at 06:24

## 2021-02-17 RX ADMIN — PREGABALIN 75 MG: 75 CAPSULE ORAL at 08:20

## 2021-02-17 RX ADMIN — ROSUVASTATIN CALCIUM 10 MG: 5 TABLET, FILM COATED ORAL at 08:20

## 2021-02-17 RX ADMIN — CLONIDINE HYDROCHLORIDE 0.1 MG: 0.1 TABLET ORAL at 15:53

## 2021-02-17 RX ADMIN — THIAMINE HCL TAB 100 MG 200 MG: 100 TAB at 22:39

## 2021-02-17 RX ADMIN — CLONIDINE HYDROCHLORIDE 0.1 MG: 0.1 TABLET ORAL at 23:07

## 2021-02-17 RX ADMIN — METOPROLOL TARTRATE 75 MG: 25 TABLET, FILM COATED ORAL at 08:20

## 2021-02-17 RX ADMIN — SODIUM CHLORIDE, POTASSIUM CHLORIDE, SODIUM LACTATE AND CALCIUM CHLORIDE: 600; 310; 30; 20 INJECTION, SOLUTION INTRAVENOUS at 00:10

## 2021-02-17 RX ADMIN — TAMSULOSIN HYDROCHLORIDE 0.4 MG: 0.4 CAPSULE ORAL at 08:20

## 2021-02-17 RX ADMIN — HYDROMORPHONE HYDROCHLORIDE 0.5 MG: 1 INJECTION, SOLUTION INTRAMUSCULAR; INTRAVENOUS; SUBCUTANEOUS at 04:44

## 2021-02-17 RX ADMIN — Medication 1.25 MG: at 17:16

## 2021-02-17 RX ADMIN — OXYCODONE HYDROCHLORIDE 10 MG: 5 TABLET ORAL at 23:07

## 2021-02-17 RX ADMIN — FAMOTIDINE 20 MG: 20 INJECTION, SOLUTION INTRAVENOUS at 22:39

## 2021-02-17 ASSESSMENT — ACTIVITIES OF DAILY LIVING (ADL)
ADLS_ACUITY_SCORE: 11

## 2021-02-17 NOTE — PLAN OF CARE
Received Antiemetic this am, no emesis, tolerated medications.  Sips of water and broth, will advance diet as tolerated, IV fluids decreased to 50 ml an hour, up independently in room, will monitor.

## 2021-02-17 NOTE — PROGRESS NOTES
Pt A&Ox4, has a steady gait with walker. Prn dilaudid given x3 for abdominal pain. Prn zofran given x1 for nausea, which resolved.

## 2021-02-17 NOTE — PROGRESS NOTES
[facility] Hospitalist Service      Subjective:  Pain is better.  He did not want to take much in for breakfast.  He is going to try to advance his diet.  No fever    Review of Systems:  .CONSTITUTIONAL: NEGATIVE for fever, chills, change in weight  INTEGUMENTARY/SKIN: NEGATIVE for worrisome rashes, moles or lesions  EYES: NEGATIVE for vision changes or irritation  ENT/MOUTH: NEGATIVE for ear, mouth and throat problems  RESP: NEGATIVE for significant cough or SOB  BREAST: NEGATIVE for masses, tenderness or discharge  CV: NEGATIVE for chest pain, palpitations or peripheral edema  GI: Pain improve, anorexic this morning.  : NEGATIVE for frequency, dysuria, or hematuria  MUSCULOSKELETAL: NEGATIVE for significant arthralgias or myalgia  NEURO: NEGATIVE for weakness, dizziness or paresthesias  ENDOCRINE: NEGATIVE for temperature intolerance, skin/hair changes  HEME: NEGATIVE for bleeding problems  PSYCHIATRIC: NEGATIVE for changes in mood or affect    Physical Exam:  Vitals Were Reviewed    Patient Vitals for the past 16 hrs:   BP Temp Temp src Pulse Resp SpO2   02/17/21 0727 129/63 95.8  F (35.4  C) Oral 68 16 92 %   02/17/21 0434 (!) 154/70 97.4  F (36.3  C) Oral 78 16 95 %   02/16/21 2243 126/59 97.5  F (36.4  C) Oral 65 14 93 %   02/16/21 2047 (!) 150/62 -- -- 72 -- --   02/16/21 1917 115/56 97.4  F (36.3  C) Oral 66 16 92 %         Intake/Output Summary (Last 24 hours) at 2/17/2021 0853  Last data filed at 2/17/2021 0400  Gross per 24 hour   Intake 2707.5 ml   Output 2425 ml   Net 282.5 ml       GENERAL APPEARANCE: healthy, alert and no distress  EYES: conjunctiva clear, eyes grossly normal  RESP: lungs clear to auscultation - no rales, rhonchi or wheezes  CV: regular rate and rhythm, normal S1 S2, no S3 or S4 and no murmur, click or rub   ABDOMEN: Just mild epigastric tenderness today.  MS: no clubbing, cyanosis; no edema  SKIN: clear without significant rashes or lesions    Lab:  Recent Labs   Lab Test  02/17/21  0504 02/16/21  0410    140   POTASSIUM 4.4 4.7   CHLORIDE 106 105   CO2 28 31   ANIONGAP 3 4   * 168*   BUN 13 16   CR 0.81 0.82   NILSON 8.2* 8.3*     CBC RESULTS:   Recent Labs   Lab Test 02/17/21  0504 02/16/21  0410   WBC 9.6 11.8*   RBC 4.32* 4.55   HGB 13.9 14.5   HCT 43.5 45.1    205       Results for orders placed or performed during the hospital encounter of 02/15/21 (from the past 24 hour(s))   Glucose by meter   Result Value Ref Range    Glucose 152 (H) 70 - 99 mg/dL   Care Management / Social Work IP Consult    Narrative    Martha Salguero RN     2/16/2021  2:00 PM  Care Management Initial Consult    General Information  Assessment completed with: Patient, Spouse, Poli and Amina  Type of CM/SW Visit: CM Role Introduction    Primary Care Provider verified and updated as needed: Yes   Readmission within the last 30 days: no previous admission in   last 30 days         Advance Care Planning: Advance Care Planning Reviewed: no   concerns identified          Communication Assessment  Patient's communication style: spoken language (English or   Bilingual)    Hearing Difficulty or Deaf: no   Wear Glasses or Blind: yes    Cognitive  Cognitive/Neuro/Behavioral: WDL                      Living Environment:   People in home: spouse  Amina      Able to return to prior arrangements: yes       Family/Social Support:  Care provided by: self  Provides care for: no one  Marital Status:   Wife  Amina       Description of Support System: Supportive, Involved    Support Assessment: Adequate family and caregiver support    Current Resources:   Patient receiving home care services: No     Community Resources: None  Equipment currently used at home: none  Supplies currently used at home: None       Financial Concerns: No concerns identified           Lifestyle & Psychosocial Needs:     Social Needs     Financial resource strain: Not hard at all     Food insecurity     Worry: Never true      "Inability: Never true     Transportation needs     Medical: No     Non-medical: No     Socioeconomic History     Marital status:      Spouse name: Lily     Number of children: Not on file     Years of education: Not on file     Highest education level: Not on file   Occupational History     Occupation: [employer]     Comment: 20 hr/week monday-Thur 7-noon     Occupation: [place]      Employer: [employer]     Occupation: RETIRED     Tobacco Use     Smoking status: Former Smoker     Packs/day: 1.00     Years: 40.00     Pack years: 40.00     Types: Cigarettes     Quit date: 2006     Years since quittin.2     Smokeless tobacco: Never Used   Substance and Sexual Activity     Alcohol use: Yes     Frequency: 4 or more times a week     Drinks per session: 3 or 4     Binge frequency: Weekly     Sexual activity: Yes     Partners: Female     Mental Health Status:  Mental Health Status: No Current Concerns       Chemical Dependency Status:  Chemical Dependency Status: Current Concern          Additional Information:    Consult received for an elevated risk for readmission (34%).  Met   with the patient and his wife Amina.  Introduced myself and role.    The patient lives with his wife independently in the community.    He is admitted with acute on chronic pancreatitis due to alcohol.    Patient reports he drinks \"a little\".  Offered CD Resources,   Patient declined.  He is not interested in CD treatment.  There   are no discharge needs identified.    Plan:  Home      Martha Salguero RN         Glucose by meter   Result Value Ref Range    Glucose 154 (H) 70 - 99 mg/dL   Glucose by meter   Result Value Ref Range    Glucose 140 (H) 70 - 99 mg/dL   Glucose by meter   Result Value Ref Range    Glucose 140 (H) 70 - 99 mg/dL   Glucose by meter   Result Value Ref Range    Glucose 169 (H) 70 - 99 mg/dL   INR   Result Value Ref Range    INR 2.36 (H) 0.86 - 1.14   Magnesium "   Result Value Ref Range    Magnesium 1.9 1.6 - 2.3 mg/dL   CBC with platelets   Result Value Ref Range    WBC 9.6 4.0 - 11.0 10e9/L    RBC Count 4.32 (L) 4.4 - 5.9 10e12/L    Hemoglobin 13.9 13.3 - 17.7 g/dL    Hematocrit 43.5 40.0 - 53.0 %     (H) 78 - 100 fl    MCH 32.2 26.5 - 33.0 pg    MCHC 32.0 31.5 - 36.5 g/dL    RDW 16.6 (H) 10.0 - 15.0 %    Platelet Count 192 150 - 450 10e9/L   Comprehensive metabolic panel   Result Value Ref Range    Sodium 137 133 - 144 mmol/L    Potassium 4.4 3.4 - 5.3 mmol/L    Chloride 106 94 - 109 mmol/L    Carbon Dioxide 28 20 - 32 mmol/L    Anion Gap 3 3 - 14 mmol/L    Glucose 164 (H) 70 - 99 mg/dL    Urea Nitrogen 13 7 - 30 mg/dL    Creatinine 0.81 0.66 - 1.25 mg/dL    GFR Estimate 88 >60 mL/min/[1.73_m2]    GFR Estimate If Black >90 >60 mL/min/[1.73_m2]    Calcium 8.2 (L) 8.5 - 10.1 mg/dL    Bilirubin Total 0.8 0.2 - 1.3 mg/dL    Albumin 2.7 (L) 3.4 - 5.0 g/dL    Protein Total 6.2 (L) 6.8 - 8.8 g/dL    Alkaline Phosphatase 55 40 - 150 U/L    ALT 19 0 - 70 U/L    AST 14 0 - 45 U/L   Lipase   Result Value Ref Range    Lipase 917 (H) 73 - 393 U/L   Glucose by meter   Result Value Ref Range    Glucose 157 (H) 70 - 99 mg/dL       Assessment and Plan:    Acute recurrent pancreatitis-probably etoh related  Alcohol abuse  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 Recent etoh intake pta, vomiting pta.  Feels  better  Lipase 1217--890--917  Wbc 12.5--11.8--9.6  Reduce LR to 50 ml per hour-ADAT  On vits and CIWA  No overt withdrawal other than some htn.  Try to advance diet.        Hypertension  Continue lisinopril and metoprolol  On scheduled clonidine  bp improved 02/17/21   prn iv labetalol     Peripheral vascular disease     History of stomach  wall prominence on CT  Needs H&P is from a goal are worse than the ER notes airway less  Continue iv pepcid     Hypomagnesemia  Mag 1.3--1.9 after replacement.     Coronary artery disease     Diabetes mellitus type 2   Continue NPH insulin 30 4 in the AM and 30 2 in the PM  Insulin order set with sliding scale insulin.     Asplenic     Chronic atrial fibrillation with long-term anticoagulation  Continue coumadin.     BPH  Continue Flomax     Hyperlipidemia  Continue Crestor     Prophylaxis-coumadin  Code -full     Plan-reduce iv fluids, pain meds, adat, follow, ciwa.  Hopefully home 1 to 2 days.

## 2021-02-17 NOTE — PLAN OF CARE
A&O x 4, makes needs known. CIWA 3. C/o intermittent abdominal pain. PRN medications offered-pt declined. VSS, afebrile. Pt continues with intermittent nausea-declined antiemetic. Refused dinner. Tolerating small sips of water with medications. LR infusing 150 mL/hr. Continue with current plan of care. Update MD with concerns.

## 2021-02-18 ENCOUNTER — DOCUMENTATION ONLY (OUTPATIENT)
Dept: FAMILY MEDICINE | Facility: CLINIC | Age: 76
End: 2021-02-18

## 2021-02-18 VITALS
DIASTOLIC BLOOD PRESSURE: 77 MMHG | SYSTOLIC BLOOD PRESSURE: 150 MMHG | RESPIRATION RATE: 18 BRPM | OXYGEN SATURATION: 90 % | BODY MASS INDEX: 28.7 KG/M2 | HEART RATE: 90 BPM | TEMPERATURE: 99.2 F | WEIGHT: 200 LBS

## 2021-02-18 DIAGNOSIS — I48.20 CHRONIC ATRIAL FIBRILLATION (H): ICD-10-CM

## 2021-02-18 DIAGNOSIS — Z79.01 LONG TERM CURRENT USE OF ANTICOAGULANT THERAPY: ICD-10-CM

## 2021-02-18 LAB
ALBUMIN SERPL-MCNC: 2.7 G/DL (ref 3.4–5)
ALP SERPL-CCNC: 65 U/L (ref 40–150)
ALT SERPL W P-5'-P-CCNC: 19 U/L (ref 0–70)
ANION GAP SERPL CALCULATED.3IONS-SCNC: 2 MMOL/L (ref 3–14)
AST SERPL W P-5'-P-CCNC: 17 U/L (ref 0–45)
BILIRUB SERPL-MCNC: 1.1 MG/DL (ref 0.2–1.3)
BUN SERPL-MCNC: 12 MG/DL (ref 7–30)
CALCIUM SERPL-MCNC: 8.5 MG/DL (ref 8.5–10.1)
CHLORIDE SERPL-SCNC: 106 MMOL/L (ref 94–109)
CO2 SERPL-SCNC: 30 MMOL/L (ref 20–32)
CREAT SERPL-MCNC: 0.86 MG/DL (ref 0.66–1.25)
ERYTHROCYTE [DISTWIDTH] IN BLOOD BY AUTOMATED COUNT: 16.4 % (ref 10–15)
GFR SERPL CREATININE-BSD FRML MDRD: 86 ML/MIN/{1.73_M2}
GLUCOSE BLDC GLUCOMTR-MCNC: 158 MG/DL (ref 70–99)
GLUCOSE BLDC GLUCOMTR-MCNC: 160 MG/DL (ref 70–99)
GLUCOSE SERPL-MCNC: 150 MG/DL (ref 70–99)
HCT VFR BLD AUTO: 43.7 % (ref 40–53)
HGB BLD-MCNC: 13.8 G/DL (ref 13.3–17.7)
INR PPP: 2.5 (ref 0.86–1.14)
LIPASE SERPL-CCNC: 435 U/L (ref 73–393)
MCH RBC QN AUTO: 32 PG (ref 26.5–33)
MCHC RBC AUTO-ENTMCNC: 31.6 G/DL (ref 31.5–36.5)
MCV RBC AUTO: 101 FL (ref 78–100)
PLATELET # BLD AUTO: 193 10E9/L (ref 150–450)
POTASSIUM SERPL-SCNC: 4.4 MMOL/L (ref 3.4–5.3)
PROT SERPL-MCNC: 6.6 G/DL (ref 6.8–8.8)
RBC # BLD AUTO: 4.31 10E12/L (ref 4.4–5.9)
SODIUM SERPL-SCNC: 138 MMOL/L (ref 133–144)
WBC # BLD AUTO: 12.5 10E9/L (ref 4–11)

## 2021-02-18 PROCEDURE — 85027 COMPLETE CBC AUTOMATED: CPT | Performed by: PHYSICIAN ASSISTANT

## 2021-02-18 PROCEDURE — 250N000013 HC RX MED GY IP 250 OP 250 PS 637: Performed by: PHYSICIAN ASSISTANT

## 2021-02-18 PROCEDURE — 80053 COMPREHEN METABOLIC PANEL: CPT | Performed by: PHYSICIAN ASSISTANT

## 2021-02-18 PROCEDURE — 99239 HOSP IP/OBS DSCHRG MGMT >30: CPT | Performed by: INTERNAL MEDICINE

## 2021-02-18 PROCEDURE — 250N000013 HC RX MED GY IP 250 OP 250 PS 637: Performed by: INTERNAL MEDICINE

## 2021-02-18 PROCEDURE — 85610 PROTHROMBIN TIME: CPT | Performed by: PHYSICIAN ASSISTANT

## 2021-02-18 PROCEDURE — 999N001017 HC STATISTIC GLUCOSE BY METER IP

## 2021-02-18 PROCEDURE — 83690 ASSAY OF LIPASE: CPT | Performed by: PHYSICIAN ASSISTANT

## 2021-02-18 PROCEDURE — 36415 COLL VENOUS BLD VENIPUNCTURE: CPT | Performed by: PHYSICIAN ASSISTANT

## 2021-02-18 RX ORDER — WARFARIN SODIUM 2.5 MG/1
2.5 TABLET ORAL
Status: DISCONTINUED | OUTPATIENT
Start: 2021-02-18 | End: 2021-02-18 | Stop reason: HOSPADM

## 2021-02-18 RX ADMIN — TAMSULOSIN HYDROCHLORIDE 0.4 MG: 0.4 CAPSULE ORAL at 08:05

## 2021-02-18 RX ADMIN — CLONIDINE HYDROCHLORIDE 0.1 MG: 0.1 TABLET ORAL at 08:05

## 2021-02-18 RX ADMIN — THIAMINE HCL TAB 100 MG 100 MG: 100 TAB at 08:05

## 2021-02-18 RX ADMIN — PREGABALIN 75 MG: 75 CAPSULE ORAL at 08:05

## 2021-02-18 RX ADMIN — ROSUVASTATIN CALCIUM 10 MG: 5 TABLET, FILM COATED ORAL at 08:04

## 2021-02-18 RX ADMIN — METOPROLOL TARTRATE 75 MG: 25 TABLET, FILM COATED ORAL at 08:04

## 2021-02-18 RX ADMIN — FOLIC ACID 1 MG: 1 TABLET ORAL at 08:14

## 2021-02-18 RX ADMIN — MULTIPLE VITAMINS W/ MINERALS TAB 1 TABLET: TAB at 08:05

## 2021-02-18 ASSESSMENT — ACTIVITIES OF DAILY LIVING (ADL)
ADLS_ACUITY_SCORE: 11

## 2021-02-18 NOTE — PROGRESS NOTES
Message sent to on call MD requesting change of insulin schedule from Q4hr to with meals and HS as patient is tolerating full liquid diet and planning regular breakfast.

## 2021-02-18 NOTE — PROGRESS NOTES
ANTICOAGULATION  MANAGEMENT: Discharge Review    Antoine Russo chart reviewed for anticoagulation continuity of care    Hospital Admission on 2/15/21 for acute recurrent pancreatitis, probably alcohol related.    Discharge disposition: Home    Results:    Recent labs: (last 7 days)     02/15/21  2252 02/17/21  0504 02/18/21  0508   INR 2.19* 2.36* 2.50*     Anticoagulation inpatient management:     more warfarin administered than maintenance regimen     Anticoagulation discharge instructions:     Warfarin dosing: home regimen continued   Bridging: No   INR goal change: No      Medication changes affecting anticoagulation: No    Additional factors affecting anticoagulation: Yes: pain    Plan     No adjustment to anticoagulation plan needed   Patient has a follow up appt scheduled Monday, can check INR with that as has other labs needed from hospital stay.     Recommended follow up is scheduled  Patient not contacted    Anticoagulation Calendar updated    Maria Victoria Castaneda RN

## 2021-02-18 NOTE — PLAN OF CARE
AOx4, up independent in room. LR @ 50ml/hr, CIWA 1/0, no medication given. PRN oxycodone 10mg x 1, tylenol x 1 for mild abdominal pain and neck/shoulder pain. Patient tolerating full liquid diet, anticipating regular diet for breakfast. Patient states he hopes to discharge today. Rested comfortably throughout shift.

## 2021-02-18 NOTE — PLAN OF CARE
[place] [facility] DISCHARGE NOTE    Patient discharged to home at 10:27 AM via wheel chair. Accompanied by spouse and staff. Discharge instructions reviewed with patient and spouse, opportunity offered to ask questions. Prescriptions - None ordered for discharge. All belongings sent with patient.    Doreen Resendez RN

## 2021-02-18 NOTE — PROGRESS NOTES
Care Management Discharge Note    Discharge Date: 02/18/21       Discharge Disposition: Home    Discharge Services: None    Discharge DME: None    Discharge Transportation: family or friend will provide    Private pay costs discussed: Not applicable    PAS Confirmation Code:  Not applicable  Patient/family educated on Medicare website which has current facility and service quality ratings: no    Education Provided on the Discharge Plan:  yes  Persons Notified of Discharge Plans: pt, family, MD  Patient/Family in Agreement with the Plan: yes    Handoff Referral Completed: Yes    Additional Information:    Pt discharged home today, has no identified discharge needs at this time.        Wilma BOSWELL, [place]   [facility] [phone]

## 2021-02-18 NOTE — DISCHARGE SUMMARY
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  The following labs/tests are recommended: Lipase,   CMP and CBC.           Unresulted Labs Ordered in the Past 30 Days of this Admission     No orders found from 1/16/2021 to 2/16/2021.      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 Recent alcohol intake prior to admission, and vomiting prior to admission  - Feels better on day of discharge, no further pain or nausea, tolerating regular diet  - Lipase 1217--890--917--435  - Wbc 12.5--11.8--9.6--12.5  - No overt withdrawal other than  some elevated BP.  - Recommended cessation of alcohol use    Hypertension  Continue lisinopril and metoprolol  - On scheduled clonidine  - BP improved 02/17/21      Peripheral vascular disease     History of stomach wall prominence on CT  - Follow up with PCP     Hypomagnesemia  - Mag 1.3--1.9 after replacement.     Coronary artery disease     Diabetes mellitus type 2   Continue NPH insulin 30 4 in the AM and 30 2 in the PM  - On insulin sliding scale during hospitalization.     Asplenia     Chronic atrial fibrillation with long-term anticoagulation  Continue coumadin.     BPH  Continue Flomax     Hyperlipidemia  Continue Crestor    Consultations This Hospital Stay   PHARMACY TO DOSE WARFARIN  PHARMACY TO DOSE WARFARIN  CARE MANAGEMENT / SOCIAL WORK IP CONSULT    Code Status   Full Code    Time Spent on this Encounter   I, Yassine Wright MD, personally saw the patient today and spent greater than 30 minutes discharging this patient.       Yassine Wright MD  [facility]  ______________________________________________________________________    Physical Exam   Vital Signs: Temp: 99.2  F (37.3  C) Temp src: Oral BP: (!) 150/77 Pulse: 90   Resp: 18 SpO2: 90 % O2 Device: None (Room air)    Weight: 200 lbs 0 oz       Gen: Well nourished, well developed, alert and oriented x 3, no acute distressed  HEENT: Atraumatic, normocephalic; sclera non-injected, anicterric; oral mucosa moist, no lesion, no exudate  Lungs: Clear to ausculation, no wheezes, no rhonchi, no rales  Heart: Regular rate, regular rhythm, no gallops, no rubs, no murmurs  GI: Bowel sound normal, no hepatosplenomegaly, no masses, non-tender, non-distended, no guarding, no rebound tenderness  Lymph: No lymphadenopathy, no edema  Skin: No rashes, no chronic venous stasis     Primary Care Physician   Katie Yoder    Discharge Orders      Reason for your hospital stay    This is a 75 year old male admitted with pancreatitis,  likely due to alcohol.     Follow-up and recommended labs and tests    Follow up with primary care provider, Katie Yoder, within 7 days for hospital follow- up.  The following labs/tests are recommended: Lipase, CMP and CBC.     Activity    Your activity upon discharge: activity as tolerated     Diet    Follow this diet upon discharge: Orders Placed This Encounter      Regular Diet Adult       Significant Results and Procedures   Most Recent 3 CBC's:  Recent Labs   Lab Test 02/18/21  0508 02/17/21  0504 02/16/21  0410   WBC 12.5* 9.6 11.8*   HGB 13.8 13.9 14.5   * 101* 99    192 205     Most Recent 3 BMP's:  Recent Labs   Lab Test 02/18/21  0508 02/17/21  0504 02/16/21  0410    137 140   POTASSIUM 4.4 4.4 4.7   CHLORIDE 106 106 105   CO2 30 28 31   BUN 12 13 16   CR 0.86 0.81 0.82   ANIONGAP 2* 3 4   NILSON 8.5 8.2* 8.3*   * 164* 168*     Most Recent 2 LFT's:  Recent Labs   Lab Test 02/18/21  0508 02/17/21  0504   AST 17 14   ALT 19 19   ALKPHOS 65 55   BILITOTAL 1.1 0.8   ,   Results for orders placed or performed during the hospital encounter of 12/28/20   CT Abdomen w Contrast    Narrative    CT ABDOMEN W CONTRAST 12/28/2020 8:02 AM    HISTORY: Recurrent pancreatitis. Pain.    CONTRAST:  97mL Isovue-370.    TECHNIQUE: CT of the abdomen is performed with IV contrast.    Routine assessed structures include the liver, spleen, pancreas,  adrenal glands, and kidneys. The retroperitoneum, including the  abdominal aorta, is also assessed. Abdominal wall structures are also  visualized.    Radiation dose for this scan is reduced using automated exposure  control, adjustment of the mA and/or kV according to patient size, or  iterative reconstruction technique.    COMPARISON: 10/17/2020.    FINDINGS: Slight fatty infiltration of the liver is again seen. The  spleen and tail of the pancreas are again shown to be surgically  absent. Chronic stomach wall prominence, particularly along the  lesser  curvature, is unchanged. Mild fascial plane thickening involving the  anterior left pararenal space is similar to the prior study. Mild mild  complex fluid density in the left subphrenic area is unchanged.  Bilateral renal cysts are again seen.      Impression    IMPRESSION:  Stable examination from 10/17/2020. No new finding.    CHEIKH TIMMONS MD       Discharge Medications   Current Discharge Medication List      CONTINUE these medications which have NOT CHANGED    Details   insulin isophane human (NOVOLIN N FLEXPEN RELION) 100 UNIT/ML injection Take 34 units of N in the am and 32 units in the pm.  Qty: 30 mL, Refills: 5    Associated Diagnoses: Type 2 diabetes mellitus with diabetic polyneuropathy, without long-term current use of insulin (H)      lisinopril (ZESTRIL) 5 MG tablet Take 1 tablet by mouth once daily  Qty: 90 tablet, Refills: 3    Associated Diagnoses: Hypertension, benign essential, goal below 140/90      metoprolol succinate ER (TOPROL-XL) 50 MG 24 hr tablet Take 1.5 tablets (75 mg) by mouth 2 times daily  Qty: 135 tablet, Refills: 3    Associated Diagnoses: Hypertension, benign essential, goal below 140/90      multivitamin, therapeutic with minerals (THERA-VIT-M) TABS Take 1 tablet by mouth daily.  Qty: 30 each, Refills: 1    Associated Diagnoses: Surgery aftercare      pregabalin (LYRICA) 75 MG capsule Take 1 capsule (75 mg) by mouth 2 times daily  Qty: 60 capsule, Refills: 5    Associated Diagnoses: Type 2 diabetes mellitus with diabetic neuropathy, with long-term current use of insulin (H)      rosuvastatin (CRESTOR) 10 MG tablet Take 1 tablet by mouth once daily  Qty: 90 tablet, Refills: 0    Associated Diagnoses: Hyperlipidemia LDL goal <100      tamsulosin (FLOMAX) 0.4 MG capsule Take 1 capsule (0.4 mg) by mouth daily  Qty: 90 capsule, Refills: 3    Associated Diagnoses: Urgency of urination; Hypertrophy of prostate without urinary obstruction      vitamin B-12 (CYANOCOBALAMIN) 100  MCG tablet Take 100 mcg by mouth daily      Continuous Blood Gluc  (FREESTYLE LISA 14 DAY READER) JOSE ALBERTO 1 each every 14 days  Qty: 6 each, Refills: 1    Associated Diagnoses: Type 2 diabetes mellitus with diabetic polyneuropathy, without long-term current use of insulin (H)      Continuous Blood Gluc Sensor (FREESTYLE LISA 14 DAY SENSOR) MISC 1 each every 14 days  Qty: 2 each, Refills: 11    Associated Diagnoses: Type 2 diabetes mellitus with diabetic polyneuropathy, without long-term current use of insulin (H)      insulin pen needle (Zazoom LUIS U/F) 32G X 4 MM miscellaneous USE PEN NEEDLE ONCE DAILY AS DIRECTED  Qty: 60 each, Refills: 0    Associated Diagnoses: Type 2 diabetes mellitus with diabetic polyneuropathy, without long-term current use of insulin (H)      warfarin ANTICOAGULANT (COUMADIN) 2.5 MG tablet Take 2.5mg (1 tab) Tues/Thurs/Sat; 1.25mg (1/2 tab) all other days or as directed by the Anticoagulation Clinic  Qty: 70 tablet, Refills: 0    Associated Diagnoses: Chronic atrial fibrillation (H)           Allergies   Allergies   Allergen Reactions     Nkda [No Known Drug Allergies]

## 2021-02-18 NOTE — DISCHARGE INSTRUCTIONS
Behavioral/Mental Health Resources  [place], [place], [facility], [facility], [place], [place], [place], [facility], [place] and [facility]    **PATIENTS SHOULD CALL/CHECK WITH THEIR INSURANCE FIRST **    Mental Health Crisis Numbers:  [place] after hours Crisis Line      [phone]     Options For Deaf + Hard of Hearing    [phone]    Text [place] to 224372      24/7 Crisis Texting line    Trans Lifeline  (Fight the epidemic of trans suicide)  1-908.153.2253    https://www.translifeline.org/    The SYLVAIN HelpLine can be reached Monday through Friday, 10 am-6 pm, ET.  8-175-226-SYLVAIN (4676) or info@sylvain.org    National Suicide Prevention LifeLine       Call the National Suicide Prevention Lifeline at 3-121-923-TALK (8609) to be connected to a counselor at a crisis center in your area if you, a family member, or friend are experiencing thoughts of suicide. The call is FREE, confidential, and always available.     *Fast-Tracker is an online mental health/chemical dependency resource site. Mental health providers, care coordinators and consumers can go to www.fast-trackermn.org  to search for community mental health providers and information with a real-time, searchable directory of mental health resources and their availability within [place]*    Crisis Mobile Services:  [facility]   [phone]  [facility]   [phone]  [facility]       [phone]  [facility]        SAME AS ABOVE  [facility]       SAME AS ABOVE  [facility]      SAME AS ABOVE  [facility]      SAME AS ABOVE  [place] and [place]:      [phone] or [phone]  Other Counties:    [place]-  Adults   [phone]   Children [phone]   [place]-  Adults  [phone]  Children [phone]     Pregnancy & Post-partum Support     Helpline [phone]  Farm & Rural Helpline NEW 3-2019    [phone]       Chemical Health Services:    Medicare & Chemical Health Assessments:  [facility]   [address]   [place], [place]   [phone]      [facility]    [address], [place]  [phone]  [facility] [place] [facility]          [address]     [place], [place]  [phone]    Rule 25 and/or Chemical Health Assessment:  If a person has insurance, s/he must contact their insurance companies Behavioral Health division and follow recommendations for a chemical health assessment and then follow the recommendations of the .    If a person does NOT have insurance, they must get a Rule 25 (state funded chemical health assessment).  They can contact their County of Residence's Chemical Health Unit to discover who their county networks to conduct the assessment.    Chemical Dependency Assessment:   - [place] [facility]: [phone]   - Behavioral Health Providers, can help find a provider near you:  [phone]      Counseling/psychotherapy:  - Associated clinic of psychology - [place],/[place]/ [facility]/[place] /other locations: [phone]    -  [facility]: [phone]    - Behavioral Healthcare Providers- Can help find a provider near you:  [phone]    - [facility] Services-[place]:  [phone]  - [facility] Counseling-[place]; [phone]  - [facility]- [place]:  [phone]  - [facility]- [place]:  [phone]  - [facility]-[place]: [phone]  - [facility]- [place]/[facility]/other locations:  [phone]  - [facility]- [facility]/[place]:  [phone]  - [facility] - [place]:  [phone]  - [facility] - [place]:  [phone]    [facility]- [place]-based in [place]:  [phone]  - [phone]  - [facility]- [place]:  [phone]  - [facility]-[place]: [phone]  - [facility]- [place] [phone]  - [facility] counseling located in [place] (not affiliated with [place]): [phone]  - [facility]- [place]:  [phone]/[place]: [phone] and other locations.  - [facility]- [place]: [phone]  - [facility]-[place]: [phone]  - [facility]- [place]:  [phone]  -   - [facility]- [place]: [phone]    - [facility]- [place]: [phone]  -   - Walk in counseling center (Free counseling services)- [facility]: [phone]     Psychiatry/ Mental Health Medication management:  - Associated clinic of psychology- [place],/[place]/[facility]/ [place]/ [facility] locations: [phone]  - Behavioral Healthcare Providers- Can help find a provider near you, if you have preferred one or [facility] they can identify who is in network and assist with scheduling an appointment:  [phone]  - [facility]: [place]/[facility] locations : [phone]  - [facility] - Collaborative model  with PCP involvement:  [phone] (requires PCP referral specifically)  - [facility]- [place]:  [phone]  - [facility]- [place]: [phone] [place]/[facility]:  [phone]/[place]:  [phone]/ [place]:  [phone]  - [facility]- [place]:   [phone]  - [facility]- [place], [phone]  - [facility] Psychiatry - Medical students who rotate yearly:  [phone]    County Numbers  - [facility]: [phone]  - [facility]: [phone]  - [facility]: [phone]  - [facility] : [phone]  - [facility]: [phone]  - [facility]: [phone]  - [facility]:  [phone]  - [facility]: [phone]  - [facility]: [phone]  - [facility]: [phone]    **Please note that this list does not include all agencies that provide services**    Care Transitions Team at [facility] [phone]        Warfarin Instructions:  Continue your home warfarin regimen of 2.5 mg on Tuesdays, Thursdays, and Saturdays; 1.25 mg the rest of the week. Please contact your anticoagulation clinic as soon as you are able after you discharge to determine when your next INR should be checked. Follow your anticoagulation clinic's instructions for any possible warfarin dose changes.

## 2021-02-19 ENCOUNTER — TELEPHONE (OUTPATIENT)
Dept: FAMILY MEDICINE | Facility: CLINIC | Age: 76
End: 2021-02-19

## 2021-02-19 NOTE — TELEPHONE ENCOUNTER
ED/UC/IP follow up phone call: [facility] discharge 2/18/21 Alcohol induced acute pancreatitis without infection or necrosis    RN please call to follow up.    Number of ED visits in past 12 months = 0

## 2021-02-19 NOTE — TELEPHONE ENCOUNTER
"ED/Discharge Protocol    \"Hi, my name is Lupis Hurt RN, a registered nurse, and I am calling on behalf of Dr. Yoder's office at [facility].  I am calling to follow up and see how things are going for you after your recent visit.\"    \"I see that you were in the (ER/UC/IP) on 2/15/21.    How are you doing now that you are home?\" pretty good he says.  Back a little sore from the hospital bed.    Is patient experiencing symptoms that may require a hospital visit?  no    Discharge Instructions    \"Let's review your discharge instructions.  What is/are the follow-up recommendations?  Pt. Response: follow up in clinic    \"Were you instructed to make a follow-up appointment?\"  Pt. Response: Yes.  Has appointment been made?   Yes      \"When you see the provider, I would recommend that you bring your discharge instructions with you.    Medications    \"How many new medications are you on since your hospitalization/ED visit?\"    0-1  \"How many of your current medicines changed (dose, timing, name, etc.) while you were in the hospital/ED visit?\"   0-1  \"Do you have questions about your medications?\"   No  \"Were you newly diagnosed with heart failure, COPD, diabetes or did you have a heart attack?\"   No  For patients on insulin: \"Did you start on insulin in the hospital or did you have your insulin dose changed?\"   No  Post Discharge Medication Reconciliation Status: discharge medications reconciled, continue medications without change.    Was MTM referral placed (*Make sure to put transitions as reason for referral)?   No    Call Summary    \"Do you have any questions or concerns about your condition or care plan at the moment?\"    No  Triage nurse advice given: call if questions          \"If you have questions or things don't continue to improve, we encourage you contact us through the main clinic number,  [phone].  Even if the clinic is not open, triage nurses are available 24/7 to help you.     We would like you " "to know that our clinic has extended hours (provide information).  We also have urgent care (provide details on closest location and hours/contact info)\"      \"Thank you for your time and take care!\"        "

## 2021-02-22 ENCOUNTER — OFFICE VISIT (OUTPATIENT)
Dept: FAMILY MEDICINE | Facility: CLINIC | Age: 76
End: 2021-02-22
Payer: COMMERCIAL

## 2021-02-22 ENCOUNTER — ANTICOAGULATION THERAPY VISIT (OUTPATIENT)
Dept: ANTICOAGULATION | Facility: CLINIC | Age: 76
End: 2021-02-22

## 2021-02-22 VITALS
DIASTOLIC BLOOD PRESSURE: 72 MMHG | HEART RATE: 94 BPM | OXYGEN SATURATION: 92 % | RESPIRATION RATE: 18 BRPM | BODY MASS INDEX: 29.64 KG/M2 | TEMPERATURE: 99 F | SYSTOLIC BLOOD PRESSURE: 144 MMHG | WEIGHT: 206.6 LBS

## 2021-02-22 DIAGNOSIS — I48.20 CHRONIC ATRIAL FIBRILLATION (H): ICD-10-CM

## 2021-02-22 DIAGNOSIS — I10 HYPERTENSION, BENIGN ESSENTIAL, GOAL BELOW 140/90: ICD-10-CM

## 2021-02-22 DIAGNOSIS — Z79.01 LONG TERM CURRENT USE OF ANTICOAGULANT THERAPY: ICD-10-CM

## 2021-02-22 DIAGNOSIS — E78.5 HYPERLIPIDEMIA LDL GOAL <100: ICD-10-CM

## 2021-02-22 LAB
ANION GAP SERPL CALCULATED.3IONS-SCNC: 4 MMOL/L (ref 3–14)
BASOPHILS # BLD AUTO: 0.1 10E9/L (ref 0–0.2)
BASOPHILS NFR BLD AUTO: 0.6 %
BUN SERPL-MCNC: 13 MG/DL (ref 7–30)
CALCIUM SERPL-MCNC: 9.5 MG/DL (ref 8.5–10.1)
CHLORIDE SERPL-SCNC: 103 MMOL/L (ref 94–109)
CHOLEST SERPL-MCNC: 107 MG/DL
CO2 SERPL-SCNC: 29 MMOL/L (ref 20–32)
CREAT SERPL-MCNC: 1.11 MG/DL (ref 0.66–1.25)
DIFFERENTIAL METHOD BLD: ABNORMAL
EOSINOPHIL # BLD AUTO: 0.1 10E9/L (ref 0–0.7)
EOSINOPHIL NFR BLD AUTO: 1.5 %
ERYTHROCYTE [DISTWIDTH] IN BLOOD BY AUTOMATED COUNT: 16 % (ref 10–15)
GFR SERPL CREATININE-BSD FRML MDRD: 65 ML/MIN/{1.73_M2}
GLUCOSE SERPL-MCNC: 137 MG/DL (ref 70–99)
HCT VFR BLD AUTO: 43 % (ref 40–53)
HDLC SERPL-MCNC: 29 MG/DL
HGB BLD-MCNC: 14 G/DL (ref 13.3–17.7)
INR PPP: 2.66 (ref 0.86–1.14)
LDLC SERPL CALC-MCNC: 54 MG/DL
LIPASE SERPL-CCNC: 614 U/L (ref 73–393)
LYMPHOCYTES # BLD AUTO: 1.9 10E9/L (ref 0.8–5.3)
LYMPHOCYTES NFR BLD AUTO: 20.9 %
MCH RBC QN AUTO: 31.6 PG (ref 26.5–33)
MCHC RBC AUTO-ENTMCNC: 32.6 G/DL (ref 31.5–36.5)
MCV RBC AUTO: 97 FL (ref 78–100)
MONOCYTES # BLD AUTO: 1.3 10E9/L (ref 0–1.3)
MONOCYTES NFR BLD AUTO: 13.9 %
NEUTROPHILS # BLD AUTO: 5.7 10E9/L (ref 1.6–8.3)
NEUTROPHILS NFR BLD AUTO: 63.1 %
NONHDLC SERPL-MCNC: 78 MG/DL
PLATELET # BLD AUTO: 246 10E9/L (ref 150–450)
POTASSIUM SERPL-SCNC: 4.2 MMOL/L (ref 3.4–5.3)
RBC # BLD AUTO: 4.43 10E12/L (ref 4.4–5.9)
SODIUM SERPL-SCNC: 136 MMOL/L (ref 133–144)
TRIGL SERPL-MCNC: 121 MG/DL
WBC # BLD AUTO: 9.1 10E9/L (ref 4–11)

## 2021-02-22 PROCEDURE — 83690 ASSAY OF LIPASE: CPT | Performed by: PHYSICIAN ASSISTANT

## 2021-02-22 PROCEDURE — 36415 COLL VENOUS BLD VENIPUNCTURE: CPT | Performed by: PHYSICIAN ASSISTANT

## 2021-02-22 PROCEDURE — 85025 COMPLETE CBC W/AUTO DIFF WBC: CPT | Performed by: PHYSICIAN ASSISTANT

## 2021-02-22 PROCEDURE — 85610 PROTHROMBIN TIME: CPT | Performed by: PHYSICIAN ASSISTANT

## 2021-02-22 PROCEDURE — 80061 LIPID PANEL: CPT | Performed by: PHYSICIAN ASSISTANT

## 2021-02-22 PROCEDURE — 99495 TRANSJ CARE MGMT MOD F2F 14D: CPT | Performed by: PHYSICIAN ASSISTANT

## 2021-02-22 PROCEDURE — 80048 BASIC METABOLIC PNL TOTAL CA: CPT | Performed by: PHYSICIAN ASSISTANT

## 2021-02-22 RX ORDER — ROSUVASTATIN CALCIUM 10 MG/1
10 TABLET, COATED ORAL DAILY
Qty: 90 TABLET | Refills: 3 | Status: SHIPPED | OUTPATIENT
Start: 2021-02-22 | End: 2022-01-18

## 2021-02-22 NOTE — PATIENT INSTRUCTIONS
I'm glad you are doing better.     I will call you with your blood work results. I have refilled your cholesterol medication.     Continue with a diet progression. Avoid alcohol, as this will likely put you back in the hospital. If you need help with this, please let us know.

## 2021-02-22 NOTE — LETTER
February 25, 2021      Antoine Russo  [address] [place] [id number]        Dear ,    We are writing to inform you of your test results.    It looks like your lipase went up a little bit. I recommend sticking to more of a soft/liquid diet and slowly progressing to more of a solid diet. The rest of your labs including your INR were stable.     Resulted Orders   CBC with platelets differential   Result Value Ref Range    WBC 9.1 4.0 - 11.0 10e9/L    RBC Count 4.43 4.4 - 5.9 10e12/L    Hemoglobin 14.0 13.3 - 17.7 g/dL    Hematocrit 43.0 40.0 - 53.0 %    MCV 97 78 - 100 fl    MCH 31.6 26.5 - 33.0 pg    MCHC 32.6 31.5 - 36.5 g/dL    RDW 16.0 (H) 10.0 - 15.0 %    Platelet Count 246 150 - 450 10e9/L    % Neutrophils 63.1 %    % Lymphocytes 20.9 %    % Monocytes 13.9 %    % Eosinophils 1.5 %    % Basophils 0.6 %    Absolute Neutrophil 5.7 1.6 - 8.3 10e9/L    Absolute Lymphocytes 1.9 0.8 - 5.3 10e9/L    Absolute Monocytes 1.3 0.0 - 1.3 10e9/L    Absolute Eosinophils 0.1 0.0 - 0.7 10e9/L    Absolute Basophils 0.1 0.0 - 0.2 10e9/L    Diff Method Automated Method    Basic metabolic panel   Result Value Ref Range    Sodium 136 133 - 144 mmol/L    Potassium 4.2 3.4 - 5.3 mmol/L    Chloride 103 94 - 109 mmol/L    Carbon Dioxide 29 20 - 32 mmol/L    Anion Gap 4 3 - 14 mmol/L    Glucose 137 (H) 70 - 99 mg/dL    Urea Nitrogen 13 7 - 30 mg/dL    Creatinine 1.11 0.66 - 1.25 mg/dL    GFR Estimate 65 >60 mL/min/[1.73_m2]      Comment:      Non  GFR Calc  Starting 12/18/2018, serum creatinine based estimated GFR (eGFR) will be   calculated using the Chronic Kidney Disease Epidemiology Collaboration   (CKD-EPI) equation.      GFR Estimate If Black 76 >60 mL/min/[1.73_m2]      Comment:       GFR Calc  Starting 12/18/2018, serum creatinine based estimated GFR (eGFR) will be   calculated using the Chronic Kidney Disease Epidemiology Collaboration   (CKD-EPI) equation.      Calcium 9.5  8.5 - 10.1 mg/dL   Lipid panel reflex to direct LDL Fasting   Result Value Ref Range    Cholesterol 107 <200 mg/dL    Triglycerides 121 <150 mg/dL    HDL Cholesterol 29 (L) >39 mg/dL    LDL Cholesterol Calculated 54 <100 mg/dL      Comment:      Desirable:       <100 mg/dl    Non HDL Cholesterol 78 <130 mg/dL   Lipase   Result Value Ref Range    Lipase 614 (H) 73 - 393 U/L       If you have any questions or concerns, please call the clinic at the number listed above.       Sincerely,      Mingo Gao PA-C/minda

## 2021-02-22 NOTE — PROGRESS NOTES
Assessment & Plan   Pancreatitis, recurrent  Hospital notes reviewed. Pt is much improved. On full diet. Vitals and exam are largely wnl. Recheck basic labs including CBC, BMP, and lipase. Encouraged ongoing cessation from alcohol. Warning signs and symptoms discussed on when to return to clinic or go to the ER. Pt verbalized understanding.   - CBC with platelets differential  - Basic metabolic panel  - Lipase    Hyperlipidemia LDL goal <100  Due for lipid panel. Ordered today. Refilled Crestor.   - Lipid panel reflex to direct LDL Fasting  - rosuvastatin (CRESTOR) 10 MG tablet; Take 1 tablet (10 mg) by mouth daily    Hypertension, benign essential, goal below 140/90  Just elevated here. Recommend recheck at INR visit in 2 weeks.     Long term current use of anticoagulant therapy  INR stable in the hospital. Recheck INR since he was given a slightly higher dose while inpatient.   - ASPIRIN NOT PRESCRIBED (INTENTIONAL); Please choose reason not prescribed from choices below.  - INR    Chronic atrial fibrillation (H)  Due for INR check. Continue to follow-up with INR clinic.   - INR    Review of external notes as documented elsewhere in note    Return in about 3 months (around 5/22/2021), or if symptoms worsen or fail to improve, for In-Clinic Visit.    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 No   Problems taking medications regularly:  None  Medication changes since discharge: None  Problems adhering to non-medication therapy:  None    Summary of hospitalization:  [employer] discharge summary reviewed  Diagnostic Tests/Treatments reviewed.  Follow up needed: none  Other Healthcare Providers Involved in Patient s Care:         None  Update since discharge: improved.   Post Discharge Medication Reconciliation: discharge medications reconciled, continue medications without change.  Plan of care communicated with patient        Doing well. No longer having abdominal pain. Diet has progressed to full solid diet.  No alcohol use for the last 7 days. He feels like he can keep this going.   He has no other complaints today.     Review of Systems   Constitutional, HEENT, cardiovascular, pulmonary, gi and gu systems are negative, except as otherwise noted.      Objective    BP (!) 144/72 (BP Location: Right arm, Patient Position: Sitting, Cuff Size: Adult Regular)   Pulse 94   Temp 99  F (37.2  C) (Tympanic)   Resp 18   Wt 93.7 kg (206 lb 9.6 oz)   SpO2 92%   BMI 29.64 kg/m    Body mass index is 29.64 kg/m .  Physical Exam   Constitutional: healthy, alert, and no distress  Head: Normocephalic. Atraumatic  Eyes: No conjunctival injection, sclera anicteric  Cardiovascular: Irregularly irregular rhythm. No murmurs, clicks, gallops, or rubs. No peripheral edema.   Respiratory: No resp distress. Lungs CTAB bilaterally.  Abdomen: soft, non-tender throughout, no rebound or guarding. NABS x4.    Musculoskeletal: extremities normal- no gross deformities noted, and normal muscle tone  Skin: no suspicious lesions or rashes  Neurologic: Gait normal. CN 2-12  grossly intact. Sensation, strength and coordination are grossly intact.   Psychiatric: mentation appears normal and affect normal/bright

## 2021-02-22 NOTE — PROGRESS NOTES
ANTICOAGULATION MANAGEMENT     Patient Name:  Antoine Russo  Date:  2021    ASSESSMENT /SUBJECTIVE:    Today's INR result of 2.66 is therapeutic. Goal INR of 2.0-3.0      Warfarin dose taken: Warfarin taken as instructed    Diet: No new diet changes affecting INR    Medication changes/ interactions: No new medications/supplements affecting INR    Previous INR: Therapeutic 2.50    S/S of bleeding or thromboembolism: No    New injury or illness: Yes: recent hospitalization for pancreatitis, patient is feeling better since he has been home.    Upcoming surgery, procedure or cardioversion: No    Additional findings: None      PLAN:    Telephone call with Antoine regarding INR result and instructed:     Warfarin Dosing Instructions: Continue your current warfarin dose 2.5mg Tues,Thurs,Sat; 1.25mg all other days    Instructed patient to follow up no later than: 2 weeks  Lab visit scheduled    Education provided: Importance of notifying clinic for changes in medications or health; a sooner lab recheck maybe needed       Poli verbalizes understanding and agrees to warfarin dosing plan.    Instructed to call the Anticoagulation Clinic for any changes, questions or concerns. (#[phone])        Adeline Duke RN CACP       OBJECTIVE:  Recent labs: (last 7 days)     02/15/21  2252 21  0504 21  0508 21  0721   INR 2.19* 2.36* 2.50* 2.66*         INR assessment THER    Recheck INR In: 2 WEEKS    INR Location Clinic      Anticoagulation Summary  As of 2021    INR goal:  2.0-3.0   TTR:  63.0 % (11.3 mo)   INR used for dosin.66 (2021)   Warfarin maintenance plan:  2.5 mg (2.5 mg x 1) every Tue, Thu, Sat; 1.25 mg (2.5 mg x 0.5) all other days   Full warfarin instructions:  2.5 mg every Tue, Thu, Sat; 1.25 mg all other days   Weekly warfarin total:  12.5 mg   Plan last modified:  Adeline Duke RN (2020)   Next INR check:  3/10/2021   Priority:  Maintenance   Target end  date:  Indefinite    Indications    Chronic atrial fibrillation (H) [I48.20]  Long term current use of anticoagulant therapy [Z79.01]             Anticoagulation Episode Summary     INR check location:      Preferred lab:      Send INR reminders to:  [facility]    Comments:  *       Anticoagulation Care Providers     Provider Role Specialty Phone number    Katie Martino MD Referring Family Medicine [phone]

## 2021-03-10 ENCOUNTER — ALLIED HEALTH/NURSE VISIT (OUTPATIENT)
Dept: FAMILY MEDICINE | Facility: CLINIC | Age: 76
End: 2021-03-10
Payer: COMMERCIAL

## 2021-03-10 ENCOUNTER — ANTICOAGULATION THERAPY VISIT (OUTPATIENT)
Dept: ANTICOAGULATION | Facility: CLINIC | Age: 76
End: 2021-03-10

## 2021-03-10 VITALS — DIASTOLIC BLOOD PRESSURE: 62 MMHG | HEART RATE: 88 BPM | RESPIRATION RATE: 18 BRPM | SYSTOLIC BLOOD PRESSURE: 124 MMHG

## 2021-03-10 DIAGNOSIS — I10 HYPERTENSION, BENIGN ESSENTIAL, GOAL BELOW 140/90: Primary | ICD-10-CM

## 2021-03-10 DIAGNOSIS — Z01.30 BP CHECK: ICD-10-CM

## 2021-03-10 DIAGNOSIS — I48.20 CHRONIC ATRIAL FIBRILLATION (H): ICD-10-CM

## 2021-03-10 DIAGNOSIS — Z79.01 LONG TERM CURRENT USE OF ANTICOAGULANT THERAPY: ICD-10-CM

## 2021-03-10 LAB
CAPILLARY BLOOD COLLECTION: NORMAL
INR PPP: 2.5 (ref 0.86–1.14)

## 2021-03-10 PROCEDURE — 85610 PROTHROMBIN TIME: CPT | Performed by: FAMILY MEDICINE

## 2021-03-10 PROCEDURE — 36416 COLLJ CAPILLARY BLOOD SPEC: CPT | Performed by: FAMILY MEDICINE

## 2021-03-10 PROCEDURE — 99207 PR NO CHARGE NURSE ONLY: CPT

## 2021-03-10 NOTE — PROGRESS NOTES
ANTICOAGULATION MANAGEMENT     Patient Name:  Antoine Russo  Date:  3/10/2021    ASSESSMENT /SUBJECTIVE:    Today's INR result of 2.5 is therapeutic. Goal INR of 2.0-3.0      Warfarin dose taken: Warfarin taken as instructed    Diet: No new diet changes affecting INR    Medication changes/ interactions: No new medications/supplements affecting INR    Previous INR: Therapeutic     S/S of bleeding or thromboembolism: No    New injury or illness: No    Upcoming surgery, procedure or cardioversion: No    Additional findings: None      PLAN:    Telephone call with Antoine regarding INR result and instructed:     Warfarin Dosing Instructions: Continue your current warfarin dose 2.5 mg Tue/Thur/Sat and 1.25 mg all other days of the week. 0% change    Instructed patient to follow up no later than: 4 weeks  Lab visit scheduled    Education provided: Target INR goal and significance of current INR result, Importance of therapeutic range, Importance of following up for INR monitoring at instructed interval and Importance of taking warfarin as instructed      Poli verbalizes understanding and agrees to warfarin dosing plan.    Instructed to call the Anticoagulation Clinic for any changes, questions or concerns. (#[phone])        Katie Morrow RN      OBJECTIVE:  Recent labs: (last 7 days)     03/10/21  0801   INR 2.50*         No question data found.  Anticoagulation Summary  As of 3/10/2021    INR goal:  2.0-3.0   TTR:  67.4 % (11.3 mo)   INR used for dosin.50 (3/10/2021)   Warfarin maintenance plan:  2.5 mg (2.5 mg x 1) every Tue, Thu, Sat; 1.25 mg (2.5 mg x 0.5) all other days   Full warfarin instructions:  2.5 mg every Tue, Thu, Sat; 1.25 mg all other days   Weekly warfarin total:  12.5 mg   No change documented:  Katie Morrow, RN   Plan last modified:  Adeline Duke RN (2020)   Next INR check:  2021   Priority:  Maintenance   Target end date:  Indefinite    Indications    Chronic  atrial fibrillation (H) [I48.20]  Long term current use of anticoagulant therapy [Z79.01]             Anticoagulation Episode Summary     INR check location:      Preferred lab:      Send INR reminders to:  [facility]    Comments:  *       Anticoagulation Care Providers     Provider Role Specialty Phone number    Katie Martino MD Referring Family Medicine [phone]         DAVIDA Andrew, RN  Anticoagulation Clinic

## 2021-03-10 NOTE — PROGRESS NOTES
Antoine Russo is a 75 year old year old patient who comes in today for a Blood Pressure check because of ongoing blood pressure monitoring.  Vital Signs as repeated by /62 pulse 88  Patient is taking medication as prescribed  Patient is tolerating medications well.  Patient is not monitoring Blood Pressure at home.   Current complaints: none  Disposition:  patient to continue with the same medication.  Continues not to drink alcohol.  Lupis Hurt RN

## 2021-03-26 ENCOUNTER — DOCUMENTATION ONLY (OUTPATIENT)
Dept: ANTICOAGULATION | Facility: CLINIC | Age: 76
End: 2021-03-26

## 2021-03-26 DIAGNOSIS — Z79.01 LONG TERM CURRENT USE OF ANTICOAGULANT THERAPY: Primary | ICD-10-CM

## 2021-03-26 DIAGNOSIS — I48.20 CHRONIC ATRIAL FIBRILLATION (H): ICD-10-CM

## 2021-04-07 ENCOUNTER — ANTICOAGULATION THERAPY VISIT (OUTPATIENT)
Dept: ANTICOAGULATION | Facility: CLINIC | Age: 76
End: 2021-04-07

## 2021-04-07 DIAGNOSIS — I48.20 CHRONIC ATRIAL FIBRILLATION (H): ICD-10-CM

## 2021-04-07 DIAGNOSIS — Z79.01 LONG TERM CURRENT USE OF ANTICOAGULANT THERAPY: ICD-10-CM

## 2021-04-07 LAB — INR PPP: 2.7 (ref 0.86–1.14)

## 2021-04-07 PROCEDURE — 85610 PROTHROMBIN TIME: CPT | Performed by: FAMILY MEDICINE

## 2021-04-07 PROCEDURE — 36416 COLLJ CAPILLARY BLOOD SPEC: CPT | Performed by: FAMILY MEDICINE

## 2021-04-07 NOTE — PROGRESS NOTES
ANTICOAGULATION MANAGEMENT     Patient Name:  Antoine Russo  Date:  2021    ASSESSMENT /SUBJECTIVE:    Today's INR result of 2.7 is therapeutic. Goal INR of 2.0-3.0      Warfarin dose taken: Warfarin taken as instructed    Diet: No new diet changes affecting INR    Medication changes/ interactions: No new medications/supplements affecting INR    Previous INR: Therapeutic     S/S of bleeding or thromboembolism: No    New injury or illness: No    Upcoming surgery, procedure or cardioversion: No    Additional findings: None      PLAN:    Telephone call with Antoine regarding INR result and instructed:     Warfarin Dosing Instructions: Continue your current warfarin dose 2.5 mg Tues,Thu,Sat; 1.25 mg ROW    Instructed patient to follow up no later than: 4 weeks  Lab visit scheduled    Education provided: Contact [facility] Anticoagulation: [phone]  with any changes, questions or concerns.       Poli verbalizes understanding and agrees to warfarin dosing plan.    Instructed to call the Anticoagulation Clinic for any changes, questions or concerns. (#[phone])        Angela Harrell RN      OBJECTIVE:  Recent labs: (last 7 days)     21  0723   INR 2.70*         No question data found.  Anticoagulation Summary  As of 2021    INR goal:  2.0-3.0   TTR:  70.9 % (11.3 mo)   INR used for dosin.70 (2021)   Warfarin maintenance plan:  2.5 mg (2.5 mg x 1) every Tue, Thu, Sat; 1.25 mg (2.5 mg x 0.5) all other days   Full warfarin instructions:  2.5 mg every Tue, Thu, Sat; 1.25 mg all other days   Weekly warfarin total:  12.5 mg   No change documented:  Angela Harrell, RN   Plan last modified:  Adeline Duke RN (2020)   Next INR check:  2021   Priority:  Maintenance   Target end date:  Indefinite    Indications    Chronic atrial fibrillation (H) [I48.20]  Long term current use of anticoagulant therapy [Z79.01]             Anticoagulation Episode Summary      INR check location:      Preferred lab:      Send INR reminders to:  [facility]    Comments:  *       Anticoagulation Care Providers     Provider Role Specialty Phone number    Katie Martino MD Referring Family Medicine [phone]

## 2021-04-15 DIAGNOSIS — I48.20 CHRONIC ATRIAL FIBRILLATION (H): ICD-10-CM

## 2021-04-19 RX ORDER — WARFARIN SODIUM 2.5 MG/1
TABLET ORAL
Qty: 70 TABLET | Refills: 0 | Status: SHIPPED | OUTPATIENT
Start: 2021-04-19 | End: 2021-07-26

## 2021-04-19 NOTE — TELEPHONE ENCOUNTER
Current warfarin dose:  Warfarin maintenance plan:  2.5 mg (2.5 mg x 1) every Tue, Thu, Sat; 1.25 mg (2.5 mg x 0.5) all other days   Full warfarin instructions:  2.5 mg every Tue, Thu, Sat; 1.25 mg all other days   Weekly warfarin total:  12.5 mg     Last INR result:  INR   Date Value Ref Range Status   04/07/2021 2.70 (H) 0.86 - 1.14 Final     Comment:     This test is intended for monitoring Coumadin therapy.  Results are not   accurate in patients with prolonged INR due to factor deficiency.       Last office visit: 2/22/2021     Refill authorized per [facility] protocol.    Jaxson FARRIS RN, CACP

## 2021-04-22 DIAGNOSIS — E11.40 TYPE 2 DIABETES MELLITUS WITH DIABETIC NEUROPATHY, WITH LONG-TERM CURRENT USE OF INSULIN (H): ICD-10-CM

## 2021-04-22 DIAGNOSIS — Z79.4 TYPE 2 DIABETES MELLITUS WITH DIABETIC NEUROPATHY, WITH LONG-TERM CURRENT USE OF INSULIN (H): ICD-10-CM

## 2021-04-23 RX ORDER — PREGABALIN 75 MG/1
CAPSULE ORAL
Qty: 180 CAPSULE | Refills: 5 | Status: SHIPPED | OUTPATIENT
Start: 2021-04-23 | End: 2021-07-13

## 2021-04-23 NOTE — TELEPHONE ENCOUNTER
Requested Prescriptions   Pending Prescriptions Disp Refills     pregabalin (LYRICA) 75 MG capsule [Pharmacy Med Name: Pregabalin 75 MG Oral Capsule] 60 capsule 0     Sig: Take 1 capsule by mouth twice daily       There is no refill protocol information for this order

## 2021-04-25 DIAGNOSIS — I10 HYPERTENSION, BENIGN ESSENTIAL, GOAL BELOW 140/90: ICD-10-CM

## 2021-04-26 RX ORDER — METOPROLOL SUCCINATE 50 MG/1
TABLET, EXTENDED RELEASE ORAL
Qty: 270 TABLET | Refills: 0 | Status: SHIPPED | OUTPATIENT
Start: 2021-04-26 | End: 2021-07-13

## 2021-05-05 ENCOUNTER — ANTICOAGULATION THERAPY VISIT (OUTPATIENT)
Dept: FAMILY MEDICINE | Facility: CLINIC | Age: 76
End: 2021-05-05

## 2021-05-05 DIAGNOSIS — Z79.01 LONG TERM CURRENT USE OF ANTICOAGULANT THERAPY: ICD-10-CM

## 2021-05-05 DIAGNOSIS — I48.20 CHRONIC ATRIAL FIBRILLATION (H): ICD-10-CM

## 2021-05-05 LAB
CAPILLARY BLOOD COLLECTION: NORMAL
INR PPP: 2.7 (ref 0.86–1.14)

## 2021-05-05 PROCEDURE — 85610 PROTHROMBIN TIME: CPT | Performed by: FAMILY MEDICINE

## 2021-05-05 PROCEDURE — 36416 COLLJ CAPILLARY BLOOD SPEC: CPT | Performed by: FAMILY MEDICINE

## 2021-05-05 NOTE — PROGRESS NOTES
ANTICOAGULATION MANAGEMENT     Patient Name:  Antoine Russo  Date:  2021    ASSESSMENT /SUBJECTIVE:    Today's INR result of 2.7 is therapeutic. Goal INR of 2.0-3.0      Warfarin dose taken: Warfarin taken as instructed    Diet: No new diet changes affecting INR    Medication changes/ interactions: No new medications/supplements affecting INR    Previous INR: Therapeutic     S/S of bleeding or thromboembolism: No    New injury or illness: No    Upcoming surgery, procedure or cardioversion: No    Additional findings: None      PLAN:    Telephone call with Antoine regarding INR result and instructed:     Warfarin Dosing Instructions: Continue your current warfarin dose 2.5mg every Tue, thu, Sat; 1.25mg all other days    Instructed patient to follow up no later than: 5 weeks  Lab visit scheduled    Education provided: Target INR goal and significance of current INR result      Poli verbalizes understanding and agrees to warfarin dosing plan.    Instructed to call the Anticoagulation Clinic for any changes, questions or concerns. (#[phone])        Noel Wilson RN      OBJECTIVE:  Recent labs: (last 7 days)     21  0718   INR 2.70*         No question data found.  Anticoagulation Summary  As of 2021    INR goal:  2.0-3.0   TTR:  73.3 % (11.3 mo)   INR used for dosin.70 (2021)   Warfarin maintenance plan:  2.5 mg (2.5 mg x 1) every Tue, Thu, Sat; 1.25 mg (2.5 mg x 0.5) all other days   Full warfarin instructions:  2.5 mg every Tue, Thu, Sat; 1.25 mg all other days   Weekly warfarin total:  12.5 mg   No change documented:  Noel Wilson RN   Plan last modified:  Adeline Duke RN (2020)   Next INR check:  2021   Priority:  Maintenance   Target end date:  Indefinite    Indications    Chronic atrial fibrillation (H) [I48.20]  Long term current use of anticoagulant therapy [Z79.01]             Anticoagulation Episode Summary     INR check location:      Preferred lab:       Send INR reminders to:  [facility]    Comments:  *       Anticoagulation Care Providers     Provider Role Specialty Phone number    Katie Martino MD Referring Family Medicine [phone]

## 2021-05-09 ENCOUNTER — HEALTH MAINTENANCE LETTER (OUTPATIENT)
Age: 76
End: 2021-05-09

## 2021-06-07 DIAGNOSIS — E11.42 TYPE 2 DIABETES MELLITUS WITH DIABETIC POLYNEUROPATHY, WITHOUT LONG-TERM CURRENT USE OF INSULIN (H): Chronic | ICD-10-CM

## 2021-06-08 RX ORDER — FLASH GLUCOSE SENSOR
KIT MISCELLANEOUS
Qty: 2 EACH | Refills: 0 | Status: SHIPPED | OUTPATIENT
Start: 2021-06-08 | End: 2021-07-13

## 2021-06-08 NOTE — TELEPHONE ENCOUNTER
Routing refill request to provider for review/approval because:  Not on the [facility] refill protocol

## 2021-06-09 ENCOUNTER — ANTICOAGULATION THERAPY VISIT (OUTPATIENT)
Dept: ANTICOAGULATION | Facility: CLINIC | Age: 76
End: 2021-06-09

## 2021-06-09 ENCOUNTER — TELEPHONE (OUTPATIENT)
Dept: ANTICOAGULATION | Facility: CLINIC | Age: 76
End: 2021-06-09

## 2021-06-09 DIAGNOSIS — Z79.01 LONG TERM CURRENT USE OF ANTICOAGULANT THERAPY: Primary | ICD-10-CM

## 2021-06-09 DIAGNOSIS — I48.20 CHRONIC ATRIAL FIBRILLATION (H): ICD-10-CM

## 2021-06-09 DIAGNOSIS — Z79.01 LONG TERM CURRENT USE OF ANTICOAGULANT THERAPY: ICD-10-CM

## 2021-06-09 LAB
CAPILLARY BLOOD COLLECTION: NORMAL
INR PPP: 2.5 (ref 0.86–1.14)

## 2021-06-09 PROCEDURE — 36416 COLLJ CAPILLARY BLOOD SPEC: CPT | Performed by: FAMILY MEDICINE

## 2021-06-09 PROCEDURE — 85610 PROTHROMBIN TIME: CPT | Performed by: FAMILY MEDICINE

## 2021-06-09 NOTE — PROGRESS NOTES
ANTICOAGULATION MANAGEMENT     Patient Name:  Antoine Russo  Date:  2021    ASSESSMENT /SUBJECTIVE:    Today's INR result of 2.50 is therapeutic. Goal INR of 2.0-3.0      Warfarin dose taken: Warfarin taken as instructed    Diet: No new diet changes affecting INR    Medication changes/ interactions: No new medications/supplements affecting INR    Previous INR: Therapeutic     S/S of bleeding or thromboembolism: No    New injury or illness: No    Upcoming surgery, procedure or cardioversion: No    Additional findings: None      PLAN:    Warfarin Dosing Instructions: Continue your current warfarin dose 2.5 mg Tue,Thu,Sat; 1.25 mg ROW.    Instructed patient to follow up no later than: 5 weeks  Check at provider office visit    Education provided: Contact [facility] Anticoagulation: [phone]  with any changes, questions or concerns.     Telephone call with Antoine whom verbalizes understanding and agrees to plan    Instructed to call the Anticoagulation Clinic for any changes, questions or concerns. (#[phone])        Angela Harrell RN      OBJECTIVE:  Recent labs: (last 7 days)     21  0718   INR 2.50*         No question data found.  Anticoagulation Summary  As of 2021    INR goal:  2.0-3.0   TTR:  80.0 % (11.3 mo)   INR used for dosin.50 (2021)   Warfarin maintenance plan:  2.5 mg (2.5 mg x 1) every Tue, Thu, Sat; 1.25 mg (2.5 mg x 0.5) all other days   Full warfarin instructions:  2.5 mg every Tue, Thu, Sat; 1.25 mg all other days   Weekly warfarin total:  12.5 mg   No change documented:  Angela Harrell, RN   Plan last modified:  Adeline Duke RN (2020)   Next INR check:  2021   Priority:  Maintenance   Target end date:  Indefinite    Indications    Chronic atrial fibrillation (H) [I48.20]  Long term current use of anticoagulant therapy [Z79.01]             Anticoagulation Episode Summary     INR check location:      Preferred lab:       Send INR reminders to:  [facility]    Comments:  *       Anticoagulation Care Providers     Provider Role Specialty Phone number    Katie Martino MD Referring Family Medicine [phone]

## 2021-07-02 DIAGNOSIS — Z79.01 LONG TERM CURRENT USE OF ANTICOAGULANT THERAPY: Chronic | ICD-10-CM

## 2021-07-02 DIAGNOSIS — I48.20 CHRONIC ATRIAL FIBRILLATION (H): Primary | Chronic | ICD-10-CM

## 2021-07-13 ENCOUNTER — LAB (OUTPATIENT)
Dept: LAB | Facility: CLINIC | Age: 76
End: 2021-07-13
Payer: COMMERCIAL

## 2021-07-13 ENCOUNTER — TELEPHONE (OUTPATIENT)
Dept: FAMILY MEDICINE | Facility: CLINIC | Age: 76
End: 2021-07-13

## 2021-07-13 ENCOUNTER — OFFICE VISIT (OUTPATIENT)
Dept: FAMILY MEDICINE | Facility: CLINIC | Age: 76
End: 2021-07-13
Payer: COMMERCIAL

## 2021-07-13 VITALS
WEIGHT: 207 LBS | HEART RATE: 98 BPM | TEMPERATURE: 97.8 F | OXYGEN SATURATION: 94 % | RESPIRATION RATE: 20 BRPM | SYSTOLIC BLOOD PRESSURE: 139 MMHG | BODY MASS INDEX: 29.7 KG/M2 | DIASTOLIC BLOOD PRESSURE: 80 MMHG

## 2021-07-13 DIAGNOSIS — R39.15 URGENCY OF URINATION: ICD-10-CM

## 2021-07-13 DIAGNOSIS — I48.20 CHRONIC ATRIAL FIBRILLATION (H): Chronic | ICD-10-CM

## 2021-07-13 DIAGNOSIS — Z79.01 LONG TERM CURRENT USE OF ANTICOAGULANT THERAPY: Chronic | ICD-10-CM

## 2021-07-13 DIAGNOSIS — Z79.4 TYPE 2 DIABETES MELLITUS WITH DIABETIC NEUROPATHY, WITH LONG-TERM CURRENT USE OF INSULIN (H): ICD-10-CM

## 2021-07-13 DIAGNOSIS — N40.0 HYPERTROPHY OF PROSTATE WITHOUT URINARY OBSTRUCTION: ICD-10-CM

## 2021-07-13 DIAGNOSIS — C04.0 MALIGNANT NEOPLASM OF ANTERIOR PORTION OF FLOOR OF MOUTH (H): ICD-10-CM

## 2021-07-13 DIAGNOSIS — S15.092S: ICD-10-CM

## 2021-07-13 DIAGNOSIS — R91.8 PULMONARY NODULES: ICD-10-CM

## 2021-07-13 DIAGNOSIS — E11.42 TYPE 2 DIABETES MELLITUS WITH DIABETIC POLYNEUROPATHY, WITHOUT LONG-TERM CURRENT USE OF INSULIN (H): Primary | ICD-10-CM

## 2021-07-13 DIAGNOSIS — Z00.00 ENCOUNTER FOR MEDICARE ANNUAL WELLNESS EXAM: Primary | ICD-10-CM

## 2021-07-13 DIAGNOSIS — E11.42 TYPE 2 DIABETES MELLITUS WITH DIABETIC POLYNEUROPATHY, WITHOUT LONG-TERM CURRENT USE OF INSULIN (H): ICD-10-CM

## 2021-07-13 DIAGNOSIS — L82.0 INFLAMED SEBORRHEIC KERATOSIS: ICD-10-CM

## 2021-07-13 DIAGNOSIS — I10 HYPERTENSION, BENIGN ESSENTIAL, GOAL BELOW 140/90: ICD-10-CM

## 2021-07-13 DIAGNOSIS — E11.40 TYPE 2 DIABETES MELLITUS WITH DIABETIC NEUROPATHY, WITH LONG-TERM CURRENT USE OF INSULIN (H): ICD-10-CM

## 2021-07-13 LAB
ALBUMIN SERPL-MCNC: 3.1 G/DL (ref 3.4–5)
ALP SERPL-CCNC: 55 U/L (ref 40–150)
ALT SERPL W P-5'-P-CCNC: 27 U/L (ref 0–70)
ANION GAP SERPL CALCULATED.3IONS-SCNC: 6 MMOL/L (ref 3–14)
AST SERPL W P-5'-P-CCNC: 27 U/L (ref 0–45)
BILIRUB SERPL-MCNC: 0.6 MG/DL (ref 0.2–1.3)
BUN SERPL-MCNC: 17 MG/DL (ref 7–30)
CALCIUM SERPL-MCNC: 9.2 MG/DL (ref 8.5–10.1)
CHLORIDE BLD-SCNC: 105 MMOL/L (ref 94–109)
CO2 SERPL-SCNC: 26 MMOL/L (ref 20–32)
CREAT SERPL-MCNC: 1.06 MG/DL (ref 0.66–1.25)
GFR SERPL CREATININE-BSD FRML MDRD: 69 ML/MIN/1.73M2
GLUCOSE BLD-MCNC: 197 MG/DL (ref 70–99)
HBA1C MFR BLD: 8.5 % (ref 0–5.6)
HOLD SPECIMEN: NORMAL
HOLD SPECIMEN: NORMAL
POTASSIUM BLD-SCNC: 5 MMOL/L (ref 3.4–5.3)
PROT SERPL-MCNC: 7.1 G/DL (ref 6.8–8.8)
SODIUM SERPL-SCNC: 137 MMOL/L (ref 133–144)
TSH SERPL DL<=0.005 MIU/L-ACNC: 1.31 MU/L (ref 0.4–4)

## 2021-07-13 PROCEDURE — 83036 HEMOGLOBIN GLYCOSYLATED A1C: CPT

## 2021-07-13 PROCEDURE — 80053 COMPREHEN METABOLIC PANEL: CPT | Performed by: FAMILY MEDICINE

## 2021-07-13 PROCEDURE — 36415 COLL VENOUS BLD VENIPUNCTURE: CPT

## 2021-07-13 PROCEDURE — 84443 ASSAY THYROID STIM HORMONE: CPT | Performed by: FAMILY MEDICINE

## 2021-07-13 PROCEDURE — 99213 OFFICE O/P EST LOW 20 MIN: CPT | Mod: 25 | Performed by: FAMILY MEDICINE

## 2021-07-13 PROCEDURE — 99397 PER PM REEVAL EST PAT 65+ YR: CPT | Performed by: FAMILY MEDICINE

## 2021-07-13 RX ORDER — METOPROLOL SUCCINATE 50 MG/1
TABLET, EXTENDED RELEASE ORAL
Qty: 270 TABLET | Refills: 3 | Status: SHIPPED | OUTPATIENT
Start: 2021-07-13 | End: 2022-09-27

## 2021-07-13 RX ORDER — PEN NEEDLE, DIABETIC 32GX 5/32"
NEEDLE, DISPOSABLE MISCELLANEOUS
Qty: 60 EACH | Refills: 0 | Status: SHIPPED | OUTPATIENT
Start: 2021-07-13

## 2021-07-13 RX ORDER — FLASH GLUCOSE SENSOR
KIT MISCELLANEOUS
Qty: 2 EACH | Refills: 3 | Status: SHIPPED | OUTPATIENT
Start: 2021-07-13 | End: 2021-11-02

## 2021-07-13 RX ORDER — HUMAN INSULIN 100 [IU]/ML
INJECTION, SUSPENSION SUBCUTANEOUS
Qty: 30 ML | Refills: 5 | Status: SHIPPED | OUTPATIENT
Start: 2021-07-13 | End: 2022-01-18

## 2021-07-13 RX ORDER — PREGABALIN 150 MG/1
150 CAPSULE ORAL 2 TIMES DAILY
Qty: 180 CAPSULE | Refills: 1 | Status: SHIPPED | OUTPATIENT
Start: 2021-07-13 | End: 2021-12-02

## 2021-07-13 RX ORDER — TAMSULOSIN HYDROCHLORIDE 0.4 MG/1
0.4 CAPSULE ORAL DAILY
Qty: 90 CAPSULE | Refills: 3 | Status: SHIPPED | OUTPATIENT
Start: 2021-07-13 | End: 2022-08-03

## 2021-07-13 ASSESSMENT — ENCOUNTER SYMPTOMS
ABDOMINAL PAIN: 0
FEVER: 0
HEMATURIA: 0
PALPITATIONS: 0
HEMATOCHEZIA: 0
CONSTIPATION: 0
COUGH: 0
JOINT SWELLING: 0
FREQUENCY: 0
ARTHRALGIAS: 0
NERVOUS/ANXIOUS: 0
DIZZINESS: 0
MYALGIAS: 0
EYE PAIN: 0
HEARTBURN: 0
DIARRHEA: 0
CHILLS: 0
SHORTNESS OF BREATH: 0
PARESTHESIAS: 0
SORE THROAT: 0
DYSURIA: 0
WEAKNESS: 0
HEADACHES: 0
NAUSEA: 0

## 2021-07-13 ASSESSMENT — ACTIVITIES OF DAILY LIVING (ADL): CURRENT_FUNCTION: NO ASSISTANCE NEEDED

## 2021-07-13 NOTE — TELEPHONE ENCOUNTER
novolin flexpen is not covered.  humalog kwikpen, lantus solostar, levemir flextouch and basaglar are listed as jpreferred alternatives per covermymeds ke [id number].  Please fax new rx if ok to change.

## 2021-07-13 NOTE — NURSING NOTE
"Chief Complaint   Patient presents with     Physical     Diabetes       Initial /80 (BP Location: Right arm)   Pulse 98   Temp 97.8  F (36.6  C) (Tympanic)   Resp 20   Wt 93.9 kg (207 lb)   SpO2 94%   BMI 29.70 kg/m   Estimated body mass index is 29.7 kg/m  as calculated from the following:    Height as of 1/6/21: 1.778 m (5' 10\").    Weight as of this encounter: 93.9 kg (207 lb).    Patient presents to the clinic using No DME    Health Maintenance that is potentially due pending provider review:  NONE    n/a    Is there anyone who you would like to be able to receive your results? No  If yes have patient fill out VIDYA    "

## 2021-07-13 NOTE — PATIENT INSTRUCTIONS
When blood sugars under 120, take 2/3 of insulin, don't skip  Recheck in 3 months    Get a CT chest in [place]    See derm    Patient Education   Personalized Prevention Plan  You are due for the preventive services outlined below.  Your care team is available to assist you in scheduling these services.  If you have already completed any of these items, please share that information with your care team to update in your medical record.  Health Maintenance Due   Topic Date Due     URINE DRUG SCREEN  Never done     ANNUAL REVIEW OF HM ORDERS  Never done     COVID-19 Vaccine (1) Never done     Annual Wellness Visit  04/14/2016     Meningitis A Vaccine (2 - Risk 2-dose series) 04/25/2016     Eye Exam  11/14/2019     Kidney Microalbumin Urine Test  03/12/2020     Diabetic Foot Exam  07/18/2020     A1C Lab  04/03/2021

## 2021-07-13 NOTE — PROGRESS NOTES
"SUBJECTIVE:   Antoine Russo is a 75 year old male who presents for Preventive Visit.      Patient has been advised of split billing requirements and indicates understanding: Yes   Are you in the first 12 months of your Medicare coverage?  No    Healthy Habits:     In general, how would you rate your overall health?  Good    Frequency of exercise:  None    Do you usually eat at least 4 servings of fruit and vegetables a day, include whole grains    & fiber and avoid regularly eating high fat or \"junk\" foods?  No    Taking medications regularly:  Yes    Medication side effects:  Not applicable    Ability to successfully perform activities of daily living:  No assistance needed    Home Safety:  No safety concerns identified    Hearing Impairment:  No hearing concerns    In the past 6 months, have you been bothered by leaking of urine?  No    In general, how would you rate your overall mental or emotional health?  Excellent      PHQ-2 Total Score: 0    Additional concerns today:  No    Do you feel safe in your environment? Yes    Have you ever done Advance Care Planning? (For example, a Health Directive, POLST, or a discussion with a medical provider or your loved ones about your wishes): Yes, advance care planning is on file.       Fall risk  Fallen 2 or more times in the past year?: No  Any fall with injury in the past year?: No    Cognitive Screening   1) Repeat 3 items (Leader, Season, Table)    2) Clock draw: NORMAL  3) 3 item recall: Recalls 2 objects   Results: NORMAL clock, 1-2 items recalled: COGNITIVE IMPAIRMENT LESS LIKELY    Mini-CogTM Copyright JACOBO Britton. Licensed by the author for use in [facility] [place]; reprinted with permission ([email or web address]). All rights reserved.      Do you have sleep apnea, excessive snoring or daytime drowsiness?: no    Reviewed and updated as needed this visit by clinical staff  Tobacco  Allergies  Meds  Problems  Med Hx  Surg Hx  Fam Hx          Reviewed and " updated as needed this visit by Provider  Tobacco  Allergies  Meds  Problems  Med Hx  Surg Hx  Fam Hx         Social History     Tobacco Use     Smoking status: Former Smoker     Packs/day: 1.00     Years: 40.00     Pack years: 40.00     Types: Cigarettes     Quit date: 2006     Years since quittin.6     Smokeless tobacco: Never Used   Substance Use Topics     Alcohol use: Yes         Alcohol Use 2021   Prescreen: >3 drinks/day or >7 drinks/week? No   Prescreen: >3 drinks/day or >7 drinks/week? -           Diabetes Follow-up    How often are you checking your blood sugar? Continuous glucose monitor  What time of day are you checking your blood sugars (select all that apply)?  Not applicable  Have you had any blood sugars above 200?  Yes   Have you had any blood sugars below 70?  No    What symptoms do you notice when your blood sugar is low?  None    What concerns do you have today about your diabetes? None     Do you have any of these symptoms? (Select all that apply)  Numbness in feet and Burning in feet    Have you had a diabetic eye exam in the last 12 months? No    Lab Results   Component Value Date    A1C 7.1 10/03/2020    A1C 6.3 2020    A1C 6.5 2020    A1C 9.0 10/17/2019    A1C 9.7 2019      on insulin 34 units and 32 units      Hyperlipidemia Follow-Up      Are you regularly taking any medication or supplement to lower your cholesterol?   Yes- Crestor    Are you having muscle aches or other side effects that you think could be caused by your cholesterol lowering medication?  No  Recent Labs   Lab Test 21  0721 20  0836 04/14/15  0853 05/15/14  0832   CHOL 107 143 144 165   HDL 29* 45 27* 30*   LDL 54 74 76 75   TRIG 121 121 206* 302*   CHOLHDLRATIO  --   --  5.3* 5.0        Hypertension Follow-up      Do you check your blood pressure regularly outside of the clinic? No     Are you following a low salt diet? Yes    Are your blood pressures ever more than  140 on the top number (systolic) OR more   than 90 on the bottom number (diastolic), for example 140/90?  On lisinopril, metoprolol    BP Readings from Last 2 Encounters:   07/13/21 139/80   03/10/21 124/62     Hemoglobin A1C (%)   Date Value   07/13/2021 8.5 (H)   10/03/2020 7.1 (H)   05/12/2020 6.3 (H)     LDL Cholesterol Calculated (mg/dL)   Date Value   02/22/2021 54   02/04/2020 74     paroxysmal atrial fibrillation   Rate controlled on metoprolol. 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   From Dr Ravi's note 8/2/17:   Mr. 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  He did not have postoperative radiotherapy and had a forearm free flap reconstruction by Dr. Yung Alvares.  His major difficulty occurred in 2013, when he developed C. diff colitis and had to have a colectomy.    At that time he was 5 years out and it was considered a cure.   He did recommend conitinues surveillance with a CT scan of the neck every 2-3 years, thyroid labs yearly and carotid ultrasound 3-4 years to evaluate for stenosis.   He is over due for CT scan of neck    Current providers sharing in care for this patient include:   Patient Care Team:  Katie Martino MD as PCP - General (Family Practice)  Andrea García MD as MD (ENT)  Grabiel Plata MD as MD (Gastroenterology)  Marcio Aggarwal MD as MD (Urology)  TEJINDER Morales MD as MD (Urology)  Lilian Churchill, RN as Registered Nurse (Urology)  Katie Martion MD as Assigned PCP  Anais Guajardo MD as MD (Neurology)  Yovanny Beasley  MD Clement as MD (Gastroenterology)  David Platt MD as Assigned Surgical Provider  Yovanny Beasley MD as Assigned Gastroenterology Provider  Baldo Ventura DPM as Assigned Musculoskeletal Provider    The following health maintenance items are reviewed in Epic and correct as of today:  Health Maintenance Due   Topic Date Due     URINE DRUG SCREEN  Never done     ANNUAL REVIEW OF HM ORDERS  Never done     COVID-19 Vaccine (1) Never done     MENINGITIS IMMUNIZATION (2 - Risk 2-dose series) 04/25/2016     EYE EXAM  11/14/2019     MICROALBUMIN  03/12/2020     DIABETIC FOOT EXAM  07/18/2020     BP Readings from Last 3 Encounters:   07/13/21 139/80   03/10/21 124/62   02/22/21 (!) 144/72    Wt Readings from Last 3 Encounters:   07/13/21 93.9 kg (207 lb)   02/22/21 93.7 kg (206 lb 9.6 oz)   02/15/21 90.7 kg (200 lb)         TSH   Date Value Ref Range Status   07/13/2021 1.31 0.40 - 4.00 mU/L Final   02/04/2020 1.58 0.40 - 4.00 mU/L Final        Review of Systems   Constitutional: Negative for chills and fever.   HENT: Negative for congestion, ear pain, hearing loss and sore throat.    Eyes: Negative for pain and visual disturbance.   Respiratory: Negative for cough and shortness of breath.    Cardiovascular: Negative for chest pain, palpitations and peripheral edema.   Gastrointestinal: Negative for abdominal pain, constipation, diarrhea, heartburn, hematochezia and nausea.   Genitourinary: Negative for discharge, dysuria, frequency, genital sores, hematuria, impotence and urgency.   Musculoskeletal: Negative for arthralgias, joint swelling and myalgias.   Skin: Negative for rash.   Neurological: Negative for dizziness, weakness, headaches and paresthesias.   Psychiatric/Behavioral: Negative for mood changes. The patient is not nervous/anxious.        OBJECTIVE:   /80 (BP Location: Right arm)   Pulse 98   Temp 97.8  F (36.6  C) (Tympanic)   Resp 20   Wt 93.9 kg (207 lb)   SpO2 94%   BMI 29.70  "kg/m   Estimated body mass index is 29.7 kg/m  as calculated from the following:    Height as of 1/6/21: 1.778 m (5' 10\").    Weight as of this encounter: 93.9 kg (207 lb).  Physical Exam  GENERAL: healthy, alert and no distress  EYES: Eyes grossly normal to inspection, PERRL and conjunctivae and sclerae normal  ENT: ears with wax bilaterally, oral: no lesion, scaring present  NECK: no adenopathy, no asymmetry, masses, or scars and thyroid normal to palpation  RESP: lungs clear to auscultation - no rales, rhonchi or wheezes  CV: irregularly irregular    ABDOMEN: soft, nontender, no hepatosplenomegaly, no masses and bowel sounds normal  MS: no gross musculoskeletal defects noted, no edema  SKIN: no suspicious lesions or rashes, prominent large seborrheic keratosis on left cheek  NEURO: Normal strength and tone, mentation intact and speech normal  PSYCH: mentation appears normal, affect normal/bright    Lab Results   Component Value Date    A1C 8.5 07/13/2021       ASSESSMENT / PLAN:   Antoine was seen today for physical and diabetes.    Diagnoses and all orders for this visit:    Encounter for Medicare annual wellness exam    Type 2 diabetes mellitus with diabetic neuropathy, with long-term current use of insulin (H)  -     pregabalin (LYRICA) 150 MG capsule; Take 1 capsule (150 mg) by mouth 2 times daily  -     OFFICE/OUTPT VISIT,EST,LEVL IV    Hypertension, benign essential, goal below 140/90  -     metoprolol succinate ER (TOPROL-XL) 50 MG 24 hr tablet; TAKE 1 & 1/2 (ONE & ONE-HALF) TABLETS BY MOUTH TWICE DAILY  -     **Comprehensive metabolic panel FUTURE 2mo; Future  -     Comprehensive metabolic panel (BMP + Alb, Alk Phos, ALT, AST, Total. Bili, TP); Future  -     TSH with free T4 reflex; Future  -     TSH with free T4 reflex  -     Comprehensive metabolic panel (BMP + Alb, Alk Phos, ALT, AST, Total. 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  -     insulin NPH (NOVOLIN N FLEXPEN RELION) 100 UNIT/ML injection; Take 34 units of N in the am and 32 units in the pm.  -     insulin pen needle (BD LUIS U/F) 32G X 4 MM miscellaneous; USE PEN NEEDLE ONCE DAILY AS DIRECTED        Patient has been advised of split billing requirements and indicates understanding: Yes  COUNSELING:  Reviewed preventive health counseling, as reflected in patient instructions    Estimated body mass index is 29.7 kg/m  as calculated from the following:    Height as of 1/6/21: 1.778 m (5' 10\").    Weight as of this encounter: 93.9 kg (207 lb).        He reports that he quit smoking about 14 years ago. His smoking use included cigarettes. He has a 40.00 pack-year smoking history. He has never used smokeless tobacco.      Appropriate preventive services were discussed with this patient, including applicable screening as appropriate for cardiovascular disease, diabetes, osteopenia/osteoporosis, and glaucoma.  As appropriate for age/gender, discussed screening for colorectal cancer, prostate cancer, breast cancer, and cervical cancer. Checklist reviewing " preventive services available has been given to the patient.    Reviewed patients plan of care and provided an AVS. The Basic Care Plan (routine screening as documented in Health Maintenance) for Antoine meets the Care Plan requirement. This Care Plan has been established and reviewed with the Patient.    Counseling Resources:  ATP IV Guidelines  Pooled Cohorts Equation Calculator  Breast Cancer Risk Calculator  Breast Cancer: Medication to Reduce Risk  FRAX Risk Assessment  ICSI Preventive Guidelines  Dietary Guidelines for Americans, 2010  Cigital's MyPlate  ASA Prophylaxis  Lung CA Screening    Katie Yoder MD  [facility]    Identified Health Risks:

## 2021-07-14 ENCOUNTER — ANTICOAGULATION THERAPY VISIT (OUTPATIENT)
Dept: ANTICOAGULATION | Facility: CLINIC | Age: 76
End: 2021-07-14

## 2021-07-14 DIAGNOSIS — I48.20 CHRONIC ATRIAL FIBRILLATION (H): Primary | ICD-10-CM

## 2021-07-14 DIAGNOSIS — Z79.01 LONG TERM CURRENT USE OF ANTICOAGULANT THERAPY: ICD-10-CM

## 2021-07-14 LAB — INR BLD: 3 (ref 0.9–1.1)

## 2021-07-14 PROCEDURE — 36415 COLL VENOUS BLD VENIPUNCTURE: CPT

## 2021-07-14 PROCEDURE — 85610 PROTHROMBIN TIME: CPT

## 2021-07-14 NOTE — PROGRESS NOTES
Anticoagulation Management    Poli requested a call back tomorrow regarding the INR result.    Today's INR result of 3.0 is therapeutic (goal INR of 2.0-3.0).  Result received from: Clinic Lab    Follow up required to confirm warfarin dose taken and assess for changes    No instructions provided. Plan to continue on maintenance dose. Recheck in 6 weeks.      Anticoagulation clinic to follow up    Angela Harrell RN

## 2021-07-15 NOTE — PROGRESS NOTES
ANTICOAGULATION MANAGEMENT     Antoine Russo 75 year old male is on warfarin with therapeutic INR result. (Goal INR 2.0-3.0)    Recent labs: (last 7 days)     07/14/21  1000   INR 3.0*       ASSESSMENT     Source(s): Patient/Caregiver Call       Warfarin doses taken: Warfarin taken as instructed    Diet: No new diet changes identified    New illness, injury, or hospitalization: No    Medication/supplement changes: None noted    Signs or symptoms of bleeding or clotting: No    Previous INR: Therapeutic last 2(+) visits    Additional findings: None     PLAN     Recommended plan for no diet, medication or health factor changes affecting INR     Dosing Instructions: Continue your current warfarin dose with next INR in 6 weeks       Summary  As of 7/14/2021    Full warfarin instructions:  2.5 mg every Tue, Thu, Sat; 1.25 mg all other days   Next INR check:  8/25/2021             Telephone call with  Poli who verbalizes understanding and agrees to plan    Lab visit scheduled    Education provided: Contact [phone]  with any changes, questions or concerns.     Plan made per [facility] anticoagulation protocol    Adeline Duke RN  Anticoagulation Clinic  7/15/2021    _______________________________________________________________________     Anticoagulation Episode Summary     Current INR goal:  2.0-3.0   TTR:  80.3 % (11.3 mo)   Target end date:  Indefinite   Send INR reminders to:  [facility]    Indications    Chronic atrial fibrillation (H) [I48.20]  Long term current use of anticoagulant therapy [Z79.01]           Comments:  *          Anticoagulation Care Providers     Provider Role Specialty Phone number    Katie Martino MD Referring Family Medicine [phone]

## 2021-07-15 NOTE — TELEPHONE ENCOUNTER
Dr Martino,  Pt wants to see if it will be cheaper to do via insurance now.  Suggest switching to lantus insulin.  Take 26 units in the am and 26 units in the pm.  Total daily dose of NPH minus 20%, split into twice a day.    Are you ok with this plan?    IF too expensive then he will go back to NPH over the counter cash price he stated.    Thanks! Ruthann Alaniz RD,[facility]

## 2021-07-16 ENCOUNTER — HOSPITAL ENCOUNTER (OUTPATIENT)
Dept: CT IMAGING | Facility: CLINIC | Age: 76
Discharge: HOME | End: 2021-07-16
Attending: FAMILY MEDICINE | Admitting: FAMILY MEDICINE
Payer: COMMERCIAL

## 2021-07-16 DIAGNOSIS — C04.0 MALIGNANT NEOPLASM OF ANTERIOR PORTION OF FLOOR OF MOUTH (H): ICD-10-CM

## 2021-07-16 DIAGNOSIS — R91.8 PULMONARY NODULES: ICD-10-CM

## 2021-07-16 PROCEDURE — 250N000011 HC RX IP 250 OP 636: Performed by: RADIOLOGY

## 2021-07-16 PROCEDURE — 250N000009 HC RX 250: Performed by: RADIOLOGY

## 2021-07-16 PROCEDURE — 71260 CT THORAX DX C+: CPT

## 2021-07-16 RX ORDER — IOPAMIDOL 755 MG/ML
80 INJECTION, SOLUTION INTRAVASCULAR ONCE
Status: COMPLETED | OUTPATIENT
Start: 2021-07-16 | End: 2021-07-16

## 2021-07-16 RX ADMIN — IOPAMIDOL 80 ML: 755 INJECTION, SOLUTION INTRAVENOUS at 08:47

## 2021-07-16 RX ADMIN — SODIUM CHLORIDE 80 ML: 9 INJECTION, SOLUTION INTRAVENOUS at 08:48

## 2021-07-19 RX ORDER — INSULIN GLARGINE 100 [IU]/ML
INJECTION, SOLUTION SUBCUTANEOUS
Qty: 15 ML | Refills: 1 | Status: SHIPPED | OUTPATIENT
Start: 2021-07-19 | End: 2021-10-14 | Stop reason: ALTCHOICE

## 2021-07-25 DIAGNOSIS — I48.20 CHRONIC ATRIAL FIBRILLATION (H): ICD-10-CM

## 2021-07-26 RX ORDER — WARFARIN SODIUM 2.5 MG/1
TABLET ORAL
Qty: 70 TABLET | Refills: 0 | Status: SHIPPED | OUTPATIENT
Start: 2021-07-26 | End: 2021-10-25

## 2021-07-26 NOTE — TELEPHONE ENCOUNTER
Current warfarin dose:  Warfarin maintenance plan:  2.5 mg (2.5 mg x 1) every Tue, Thu, Sat; 1.25 mg (2.5 mg x 0.5) all other days   Weekly warfarin total:  12.5 mg     Last office visit: 7/13/2021    Last INR result:  INR   Date Value Ref Range Status   07/14/2021 3.0 (H) 0.9 - 1.1 Final   06/09/2021 2.50 (H) 0.86 - 1.14 Final     Comment:     This test is intended for monitoring Coumadin therapy.  Results are not   accurate in patients with prolonged INR due to factor deficiency.         Refill authorized per [facility] protocol.    BRYAN Castaneda RN BSN

## 2021-08-05 ENCOUNTER — MYC MEDICAL ADVICE (OUTPATIENT)
Dept: FAMILY MEDICINE | Facility: CLINIC | Age: 76
End: 2021-08-05

## 2021-08-05 NOTE — LETTER
[facility]  [address] [place] [id number]  Phone: [phone]  Fax: [phone]    08/06/21    Re: Antoine Russo  [address] [address]    Case number:  [id number]      To whom it may concern:       I'm writing this letter on behalf of my patient, Antoine Russo. He had a CT scan of the chest on 7/31/17 by his cancer doctor which showed a abnormality. It was recommended that it be repeated in 3 months to show stability.    IMPRESSION:  There is a subpleural area of nodularity in the right  lower lobe that is slightly more prominent compared to the prior  study. Follow-up chest CT in 3 months is recommended in reassessment.     Unfortunately, due to other ongoing health problems this was not done at that time.     He eventually had this CT scan done on 7/16/21 to recheck this area of nodularity.  Fortunately, it was stable.     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 This has been ordered for him.      It is my pleasure to participate in Antoine's health care needs. Please feel free to call if any questions or concerns.       Sincerely,        Katie Yoder MD  Electronically signed

## 2021-08-06 NOTE — TELEPHONE ENCOUNTER
Please fax a copy of the CT scan from 7/31/17 along with the letter I wrote today to this number:     Fax: [phone]

## 2021-08-06 NOTE — TELEPHONE ENCOUNTER
Pt not sure why this was not done.  He wants to wait until after the Insurance is figured out  Before going ahead of doing the other tests. Pt is aware he is to have this done / Scheduled.  Janie [place] Sec

## 2021-08-19 ENCOUNTER — OFFICE VISIT (OUTPATIENT)
Dept: DERMATOLOGY | Facility: CLINIC | Age: 76
End: 2021-08-19
Payer: COMMERCIAL

## 2021-08-19 ENCOUNTER — HOSPITAL ENCOUNTER (EMERGENCY)
Facility: CLINIC | Age: 76
Discharge: HOME | End: 2021-08-19
Attending: EMERGENCY MEDICINE | Admitting: EMERGENCY MEDICINE
Payer: COMMERCIAL

## 2021-08-19 VITALS
WEIGHT: 200 LBS | DIASTOLIC BLOOD PRESSURE: 97 MMHG | BODY MASS INDEX: 28.7 KG/M2 | RESPIRATION RATE: 16 BRPM | SYSTOLIC BLOOD PRESSURE: 182 MMHG | TEMPERATURE: 97 F | OXYGEN SATURATION: 93 % | HEART RATE: 89 BPM

## 2021-08-19 VITALS — DIASTOLIC BLOOD PRESSURE: 84 MMHG | OXYGEN SATURATION: 94 % | SYSTOLIC BLOOD PRESSURE: 165 MMHG | HEART RATE: 81 BPM

## 2021-08-19 DIAGNOSIS — Z51.89 VISIT FOR WOUND CHECK: ICD-10-CM

## 2021-08-19 DIAGNOSIS — D48.5 NEOPLASM OF UNCERTAIN BEHAVIOR OF SKIN: ICD-10-CM

## 2021-08-19 PROCEDURE — 272N000397 HC DRESSING QUICK CLOT 4X4: Performed by: EMERGENCY MEDICINE

## 2021-08-19 PROCEDURE — 11102 TANGNTL BX SKIN SINGLE LES: CPT | Performed by: PHYSICIAN ASSISTANT

## 2021-08-19 PROCEDURE — 99213 OFFICE O/P EST LOW 20 MIN: CPT | Mod: 25 | Performed by: PHYSICIAN ASSISTANT

## 2021-08-19 PROCEDURE — 99282 EMERGENCY DEPT VISIT SF MDM: CPT | Performed by: EMERGENCY MEDICINE

## 2021-08-19 PROCEDURE — 88305 TISSUE EXAM BY PATHOLOGIST: CPT | Performed by: DERMATOLOGY

## 2021-08-19 NOTE — LETTER
8/19/2021         RE: Antoine Russo  [address] [address]        Dear Colleague,    Thank you for referring your patient, Antoine Russo, to the [facility]. Please see a copy of my visit note below.    Antoine Russo is an extremely pleasant 75 year old year old male patient here today for spot on left cheek. Present for years. Goes away with cryo but then returns. He notes it will rub on mask. He would like to shave spot.  Patient has no other skin complaints today.  Remainder of the HPI, Meds, PMH, Allergies, FH, and SH was reviewed in chart.    Past Medical History:   Diagnosis Date     Acute kidney injury (H) 4/17/2019     Acute on chronic pancreatitis (H) 1/23/2018     Bacteriuria with pyuria 4/17/2019     C. difficile colitis      Colon polyp      Colon polyp 8/26/2011    Colonoscopy 8/2011-A sessile polyp was found in the cecum. The polyp was 6 mm in size. The polyp was removed with a hot snare. Resection and retrieval were complete. A sessile polyp was found in the proximal transverse colon. The polyp was 15 mm in size. The polyp was removed with a hot snare. Resection and retrieval were complete. A sessile polyp was found in the sigmoid colon. The polyp was 5 mm     Coronary artery disease      Diabetes mellitus (H)     type 2     Diverticulitis      Diverticulitis of colon 7/17/2007    Colitis on CT Scan 5/2011- [place] [facility] 8/2011 Diverticulitis - Multiple small and large-mouthed diverticula were found in the mid sigmoid colon and at the hepatic flexure. There was narrowing of the colon in association with the diverticular opening. Nena-diverticular erythema was seen. There was evidence of an impacted diverticulum. 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     Noninfectious ileitis      Recurrent pancreatitis         Past Surgical History:   Procedure Laterality Date     BREAST SURGERY  2008    right breast mass benign     COLONOSCOPY      multiple polyps removed     COLONOSCOPY  8/24/2011    Procedure:COMBINED COLONOSCOPY, REMOVE TUMOR/POLYP/LESION BY SNARE; Surgeon:MILEY ARBOLEDA; Location:[facility]     COLONOSCOPY  12/17/2012    Procedure: COLONOSCOPY;;  Surgeon: Leon Maurer MD;  [facility]: [facility]     COLONOSCOPY  12/18/2012    Procedure: COLONOSCOPY;;  Surgeon: Leon Maurer MD;  [facility]     DENERVATION OF SPERMATIC CORD MICROSURGICAL Left 5/23/2017    Procedure: DENERVATION OF SPERMATIC CORD MICROSURGICAL;;  Surgeon: Marcio Aggarwal MD;  [facility]     DISSECTION RADICAL NECK BILATERAL  8/2/2012    Procedure: DISSECTION RADICAL NECK BILATERAL;;  Surgeon: Yung Alvares MD;  [facility]     ENDOSCOPIC RETROGRADE CHOLANGIOPANCREATOGRAM N/A 5/10/2016    Procedure: COMBINED ENDOSCOPIC RETROGRADE CHOLANGIOPANCREATOGRAPHY, PLACE TUBE/STENT;  Surgeon: Yovanny Beasley MD;  [facility]     ENDOSCOPIC RETROGRADE CHOLANGIOPANCREATOGRAM N/A 3/29/2018    Procedure: ENDOSCOPIC RETROGRADE CHOLANGIOPANCREATOGRAM;  Endoscopic Retrograde Cholangiopancreatogram, Endoscopic Ultrasound, Biliary Sphincterotomy, Biliary and Pancreatic Stent Placement;  Surgeon: Yovanny Beasley MD;  [facility]     ENDOSCOPIC ULTRASOUND UPPER GASTROINTESTINAL TRACT (GI) N/A 2/3/2016    Procedure: ENDOSCOPIC ULTRASOUND, ESOPHAGOSCOPY / UPPER GASTROINTESTINAL TRACT (GI);  Surgeon: Grabiel Plata MD;  [facility]     ENDOSCOPIC ULTRASOUND UPPER GASTROINTESTINAL TRACT (GI) N/A 3/29/2018    Procedure: ENDOSCOPIC ULTRASOUND, ESOPHAGOSCOPY / UPPER GASTROINTESTINAL TRACT (GI);;  Surgeon: Grabiel Plata MD;  [facility]     ESOPHAGOSCOPY, GASTROSCOPY, DUODENOSCOPY (EGD), COMBINED N/A 2/3/2016    Procedure: COMBINED ENDOSCOPIC ULTRASOUND, ESOPHAGOSCOPY, GASTROSCOPY, DUODENOSCOPY  (EGD), FINE NEEDLE ASPIRATE/BIOPSY;  Surgeon: Grabiel Plata MD;  [facility]     ESOPHAGOSCOPY, GASTROSCOPY, DUODENOSCOPY (EGD), COMBINED N/A 6/8/2016    Procedure: COMBINED ESOPHAGOSCOPY, GASTROSCOPY, DUODENOSCOPY (EGD), REMOVE FOREIGN BODY;  Surgeon: Yovanny Beasley MD;  [facility]     ESOPHAGOSCOPY, GASTROSCOPY, DUODENOSCOPY (EGD), COMBINED N/A 4/17/2018    Procedure: COMBINED ESOPHAGOSCOPY, GASTROSCOPY, DUODENOSCOPY (EGD), REMOVE FOREIGN BODY;  EGD with stent removal;  Surgeon: Grabiel Plata MD;  [facility]     EXCISE LESION INTRAORAL  6/14/2012    Procedure: EXCISE LESION INTRAORAL;  Wide Local Excision Floor of Mouth, Direct Laryngoscopy, Bilateral Stanfield's Marsuplization, Split Thickness Skin Graft from right Thigh  Latex Safe;  Surgeon: Gerson Ravi MD;  [facility]     EXCISE LESION INTRAORAL  8/2/2012    Procedure: EXCISE LESION INTRAORAL;  Floor of Mouth Resection, Bilateral Selective Radical Neck Dissection, Tracheostomy, Left Radial Forearm  Free Flap with Alloderm, Nasogastric Feeding Tube Placement,    * Latex Safe*;  Surgeon: Gerson Ravi MD;  [facility]     EXCISE LESION INTRAORAL  12/11/2012    Procedure: EXCISE LESION INTRAORAL;  takedown of oral flap;  Surgeon: Yung Alvares MD;  [facility]     GRAFT FREE VASCULARIZED (LOCATION)  8/2/2012    Procedure: GRAFT FREE VASCULARIZED (LOCATION);;  Surgeon: Yung Alvares MD;  [facility]     GRAFT SKIN SPLIT THICKNESS FROM EXTREMITY  6/14/2012    Procedure: GRAFT SKIN SPLIT THICKNESS FROM EXTREMITY;;  Surgeon: Gerson Ravi MD;  [facility]     LAPAROSCOPIC ILEOSTOMY TAKEDOWN  6/6/2013    Procedure: LAPAROSCOPIC ILEOSTOMY TAKEDOWN;  Laparoscopic Closure of Enterostomy, Guerda's Type with IleoRectal Anastomosis ;  Surgeon: Grabiel Riddle MD;  [facility]     LAPAROTOMY EXPLORATORY  12/20/2012    Procedure: LAPAROTOMY EXPLORATORY;  Exploratory Laparotomy, total abdominal  colectomy, ileostomy formation;  Surgeon: Miquel Cannon MD;  [facility]     LARYNGOSCOPY  2012    Procedure: LARYNGOSCOPY;;  Surgeon: Gerson Ravi MD;  [facility]     ORTHOPEDIC SURGERY      ganglian cyst left ankle     PANCREATECTOMY, SPLENECTOMY N/A 3/10/2016    Procedure: PANCREATECTOMY, SPLENECTOMY;  Surgeon: Nael Abel MD;  [facility]     SHOULDER SURGERY  ,     2006- right rotator cuff,  bone spur on left. Dr. Hdez     VARICOCELECTOMY Left 2017    Procedure: VARICOCELECTOMY;  Left Varicocele Repair, Denervation of Left Testis;  Surgeon: Marcio Aggarwal MD;  [facility]        Family History   Problem Relation Age of Onset     Diabetes Sister         onset age 50     Alzheimer Disease Mother          80     Alzheimer Disease Father          85     Diabetes Other         nephew type 1     Diabetes Other      Aneurysm Sister      Anesthesia Reaction No family hx of      Colon Cancer No family hx of      Colon Polyps No family hx of      Crohn's Disease No family hx of      Ulcerative Colitis No family hx of        Social History     Socioeconomic History     Marital status:      Spouse name: Lily     Number of children: Not on file     Years of education: Not on file     Highest education level: Not on file   Occupational History     Occupation: [employer]     Comment: 20 hr/week monday-Thur 7-noon     Occupation: [employer]      Employer: [employer]     Occupation: RETIRED   Tobacco Use     Smoking status: Former Smoker     Packs/day: 1.00     Years: 40.00     Pack years: 40.00     Types: Cigarettes     Quit date: 2006     Years since quittin.7     Smokeless tobacco: Never Used   Substance and Sexual Activity     Alcohol use: Yes     Drug use: Not on file     Sexual activity: Yes     Partners: Female   Other Topics Concern      Service Yes     Comment: [employer] 6 years     Blood  Transfusions No     Caffeine Concern Yes     Comment: 0-2 cups     Occupational Exposure No     Comment: retired     Hobby Hazards No     Sleep Concern No     Stress Concern No     Weight Concern No     Special Diet Yes     Comment: healthy     Back Care No     Exercise Yes     Bike Helmet Not Asked     Comment: N/A     Seat Belt Yes     Self-Exams Yes     Parent/sibling w/ CABG, MI or angioplasty before 65F 55M? No   Social History Narrative    Son lives in [place] with 2 grandchildren 19 and 6     Social Determinants of Health     Financial Resource Strain: Low Risk      Difficulty of Paying Living Expenses: Not hard at all   Food Insecurity: No Food Insecurity     Worried About Running Out of Food in the Last Year: Never true     Ran Out of Food in the Last Year: Never true   Transportation Needs: No Transportation Needs     Lack of Transportation (Medical): No     Lack of Transportation (Non-Medical): No   Physical Activity:      Days of Exercise per Week:      Minutes of Exercise per Session:    Stress:      Feeling of Stress :    Social Connections:      Frequency of Communication with Friends and Family:      Frequency of Social Gatherings with Friends and Family:      Attends [employer] Services:      Active Member of Clubs or Organizations:      Attends Club or Organization Meetings:      Marital Status:    Intimate Partner Violence:      Fear of Current or Ex-Partner:      Emotionally Abused:      Physically Abused:      Sexually Abused:        Outpatient Encounter Medications as of 8/19/2021   Medication Sig Dispense Refill     ASPIRIN NOT PRESCRIBED (INTENTIONAL) Please choose reason not prescribed from choices below.       Continuous Blood Gluc Sensor ([employer] LISA 14 DAY SENSOR) MISC USE 1 EVERY 14 DAYS. 2 each 3     insulin NPH (NOVOLIN N FLEXPEN RELION) 100 UNIT/ML injection Take 34 units of N in the am and 32 units in the pm. 30 mL 5     insulin pen needle (BD LUIS U/F) 32G X 4 MM miscellaneous  USE PEN NEEDLE ONCE DAILY AS DIRECTED 60 each 0     LANTUS SOLOSTAR 100 UNIT/ML soln Take 26 units in the am and 26 units in the pm 15 mL 1     lisinopril (ZESTRIL) 5 MG tablet Take 1 tablet by mouth once daily 90 tablet 3     metoprolol succinate ER (TOPROL-XL) 50 MG 24 hr tablet TAKE 1 & 1/2 (ONE & ONE-HALF) TABLETS BY MOUTH TWICE DAILY 270 tablet 3     multivitamin, therapeutic with minerals (THERA-VIT-M) TABS Take 1 tablet by mouth daily. 30 each 1     pregabalin (LYRICA) 150 MG capsule Take 1 capsule (150 mg) by mouth 2 times daily 180 capsule 1     rosuvastatin (CRESTOR) 10 MG tablet Take 1 tablet (10 mg) by mouth daily 90 tablet 3     tamsulosin (FLOMAX) 0.4 MG capsule Take 1 capsule (0.4 mg) by mouth daily 90 capsule 3     vitamin B-12 (CYANOCOBALAMIN) 100 MCG tablet Take 100 mcg by mouth daily       warfarin ANTICOAGULANT (COUMADIN) 2.5 MG tablet TAKE 1 TABLET BY MOUTH ON TUES/THURS/SAT. TAKE 1/2 TABLET BY MOUTH ALL OTHER DAYS OR AS DIRECTED BY ANTICOAGULATION CLINIC. 70 tablet 0     No facility-administered encounter medications on file as of 8/19/2021.             O:   NAD, WDWN, Alert & Oriented, Mood & Affect wnl, Vitals stable   Here today alone   BP (!) 165/84   Pulse 81   SpO2 94%    General appearance normal   Vitals stable   Alert, oriented and in no acute distress     1.7 cm brown stuck on plaque on left medial cheek     Eyes: Conjunctivae/lids:Normal     ENT: Lips: normal    MSK:Normal    Pulm: Breathing Normal    Neuro/Psych: Orientation:Alert and Orientedx3 ; Mood/Affect:normal   A/P:  1.R/O SK on left medial cheek  TANGENTIAL BIOPSY SENT OUT:  After consent, anesthesia with LEC and prep, tangential excision performed and specimen sent out for permanent section histology.  No complications and routine wound care. Patient told to call our office in 1-2 weeks for result.      Poli or Bookem to follow up with Primary Care provider regarding elevated blood pressure.          Again, thank you for  allowing me to participate in the care of your patient.        Sincerely,        Emani Falk PA-C

## 2021-08-19 NOTE — PATIENT INSTRUCTIONS
Wound Care Instructions     FOR SUPERFICIAL WOUNDS     AFTER 24 HOURS YOU SHOULD REMOVE THE BANDAGE AND BEGIN DAILY DRESSING CHANGES AS FOLLOWS:     1) Remove Dressing.     2) Clean and dry the area with tap water using a Q-tip or sterile gauze pad.     3) Apply Polysporin ointment, vaseline, aquaphor or Bacitracin ointment over entire wound.  Do NOT use Neosporin ointment.     4) Cover the wound with a band-aid, or a sterile non-stick gauze pad and micropore paper tape      REPEAT THESE INSTRUCTIONS AT LEAST ONCE A DAY UNTIL THE WOUND HAS COMPLETELY HEALED.    It is an old wives tale that a wound heals better when it is exposed to air and allowed to dry out. The wound will heal faster with a better cosmetic result if it is kept moist with ointment and covered with a bandage.    **Do not let the wound dry out.**      Supplies Needed:      *Cotton tipped applicators (Q-tips)    *Polysporin Ointment or Bacitracin Ointment (NOT NEOSPORIN)    *Band-aids or non-stick gauze pads and micropore paper tape.    PATIENT INFORMATION:    During the healing process you will notice a number of changes. All wounds develop a small halo of redness surrounding the wound.  This means healing is occurring. Severe itching with extensive redness usually indicates sensitivity to the ointment or bandage tape used to dress the wound.  You should call our office if this develops.      Swelling  and/or discoloration around your surgical site is common, particularly when performed around the eye.    All wounds normally drain.  The larger the wound the more drainage there will be.  After 7-10 days, you will notice the wound beginning to shrink and new skin will begin to grow.  The wound is healed when you can see skin has formed over the entire area.  A healed wound has a healthy, shiny look to the surface and is red to dark pink in color to normalize.  Wounds may take approximately 4-6 weeks to heal.  Larger wounds may take 6-8 weeks.  After  the wound is healed you may discontinue dressing changes.    You may experience a sensation of tightness as your wound heals. This is normal and will gradually subside.    Your healed wound may be sensitive to temperature changes. This sensitivity improves with time, but if you re having a lot of discomfort, try to avoid temperature extremes.    Patients frequently experience itching after their wound appears to have healed because of the continue healing under the skin.  Plain Vaseline will help relieve the itching.    BLEEDIN. Leave the bandage in place.  2. Use tightly rolled up gauze or a cloth to apply direct pressure over the bandage for 20 minutes.  3. Reapply pressure for an additional 20 minutes if necessary  4. Call the office or go to the nearest emergency room if pressure fails to stop the bleeding.  5. Use additional gauze and tape to maintain pressure once the bleeding has stopped.  6. If pressure alone does not stop the bleeding, call the Dermatology Clinic at [phone] or go to the nearest Emergency room.

## 2021-08-19 NOTE — NURSING NOTE
Chief Complaint   Patient presents with     Derm Problem       Vitals:    08/19/21 0746   BP: (!) 165/84   Pulse: 81   SpO2: 94%     Wt Readings from Last 1 Encounters:   07/13/21 93.9 kg (207 lb)       Bernie Mejias LPN.................8/19/2021

## 2021-08-19 NOTE — PROGRESS NOTES
Antoine Russo is an extremely pleasant 75 year old year old male patient here today for spot on left cheek. Present for years. Goes away with cryo but then returns. He notes it will rub on mask. He would like to shave spot.  Patient has no other skin complaints today.  Remainder of the HPI, Meds, PMH, Allergies, FH, and SH was reviewed in chart.    Past Medical History:   Diagnosis Date     Acute kidney injury (H) 4/17/2019     Acute on chronic pancreatitis (H) 1/23/2018     Bacteriuria with pyuria 4/17/2019     C. difficile colitis      Colon polyp      Colon polyp 8/26/2011    Colonoscopy 8/2011-A sessile polyp was found in the cecum. The polyp was 6 mm in size. The polyp was removed with a hot snare. Resection and retrieval were complete. A sessile polyp was found in the proximal transverse colon. The polyp was 15 mm in size. The polyp was removed with a hot snare. Resection and retrieval were complete. A sessile polyp was found in the sigmoid colon. The polyp was 5 mm     Coronary artery disease      Diabetes mellitus (H)     type 2     Diverticulitis      Diverticulitis of colon 7/17/2007    Colitis on CT Scan 5/2011- [place] [facility] 8/2011 Diverticulitis - Multiple small and large-mouthed diverticula were found in the mid sigmoid colon and at the hepatic flexure. There was narrowing of the colon in association with the diverticular opening. Nena-diverticular erythema was seen. There was evidence of an impacted diverticulum. 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     Noninfectious ileitis      Recurrent pancreatitis        Past Surgical History:   Procedure Laterality Date     BREAST SURGERY  2008    right breast mass benign     COLONOSCOPY      multiple polyps removed     COLONOSCOPY  8/24/2011    Procedure:COMBINED COLONOSCOPY, REMOVE TUMOR/POLYP/LESION BY SNARE;  Surgeon:MILEY ARBOLEDA; Location:[facility]     COLONOSCOPY  12/17/2012    Procedure: COLONOSCOPY;;  Surgeon: Leon Maurer MD;  Location: [facility]     COLONOSCOPY  12/18/2012    Procedure: COLONOSCOPY;;  Surgeon: Leon Maurer MD;  [facility]     DENERVATION OF SPERMATIC CORD MICROSURGICAL Left 5/23/2017    Procedure: DENERVATION OF SPERMATIC CORD MICROSURGICAL;;  Surgeon: Marcio Aggarwal MD;  [facility]     DISSECTION RADICAL NECK BILATERAL  8/2/2012    Procedure: DISSECTION RADICAL NECK BILATERAL;;  Surgeon: Yung Alvares MD;  [facility]     ENDOSCOPIC RETROGRADE CHOLANGIOPANCREATOGRAM N/A 5/10/2016    Procedure: COMBINED ENDOSCOPIC RETROGRADE CHOLANGIOPANCREATOGRAPHY, PLACE TUBE/STENT;  Surgeon: Yovanny Beasley MD;  [facility]     ENDOSCOPIC RETROGRADE CHOLANGIOPANCREATOGRAM N/A 3/29/2018    Procedure: ENDOSCOPIC RETROGRADE CHOLANGIOPANCREATOGRAM;  Endoscopic Retrograde Cholangiopancreatogram, Endoscopic Ultrasound, Biliary Sphincterotomy, Biliary and Pancreatic Stent Placement;  Surgeon: Yovanny Beasley MD;  [facility]     ENDOSCOPIC ULTRASOUND UPPER GASTROINTESTINAL TRACT (GI) N/A 2/3/2016    Procedure: ENDOSCOPIC ULTRASOUND, ESOPHAGOSCOPY / UPPER GASTROINTESTINAL TRACT (GI);  Surgeon: Grabiel Plata MD;  [facility]     ENDOSCOPIC ULTRASOUND UPPER GASTROINTESTINAL TRACT (GI) N/A 3/29/2018    Procedure: ENDOSCOPIC ULTRASOUND, ESOPHAGOSCOPY / UPPER GASTROINTESTINAL TRACT (GI);;  Surgeon: Grabiel Plata MD;  [facility]     ESOPHAGOSCOPY, GASTROSCOPY, DUODENOSCOPY (EGD), COMBINED N/A 2/3/2016    Procedure: COMBINED ENDOSCOPIC ULTRASOUND, ESOPHAGOSCOPY, GASTROSCOPY, DUODENOSCOPY (EGD), FINE NEEDLE ASPIRATE/BIOPSY;  Surgeon: Grabiel Plata MD;  [facility]     ESOPHAGOSCOPY, GASTROSCOPY, DUODENOSCOPY (EGD), COMBINED N/A 6/8/2016    Procedure: COMBINED ESOPHAGOSCOPY, GASTROSCOPY, DUODENOSCOPY (EGD), REMOVE FOREIGN BODY;   Surgeon: Yovanny Beasley MD;  [facility]     ESOPHAGOSCOPY, GASTROSCOPY, DUODENOSCOPY (EGD), COMBINED N/A 4/17/2018    Procedure: COMBINED ESOPHAGOSCOPY, GASTROSCOPY, DUODENOSCOPY (EGD), REMOVE FOREIGN BODY;  EGD with stent removal;  Surgeon: Grabiel Plata MD;  [facility]     EXCISE LESION INTRAORAL  6/14/2012    Procedure: EXCISE LESION INTRAORAL;  Wide Local Excision Floor of Mouth, Direct Laryngoscopy, Bilateral Ida's Marsuplization, Split Thickness Skin Graft from right Thigh  Latex Safe;  Surgeon: Gerson Ravi MD;  [facility]     EXCISE LESION INTRAORAL  8/2/2012    Procedure: EXCISE LESION INTRAORAL;  Floor of Mouth Resection, Bilateral Selective Radical Neck Dissection, Tracheostomy, Left Radial Forearm  Free Flap with Alloderm, Nasogastric Feeding Tube Placement,    * Latex Safe*;  Surgeon: Gerson Ravi MD;  [facility]     EXCISE LESION INTRAORAL  12/11/2012    Procedure: EXCISE LESION INTRAORAL;  takedown of oral flap;  Surgeon: Yung Alvares MD;  [facility]     GRAFT FREE VASCULARIZED (LOCATION)  8/2/2012    Procedure: GRAFT FREE VASCULARIZED (LOCATION);;  Surgeon: Yung Alvares MD;  [facility]     GRAFT SKIN SPLIT THICKNESS FROM EXTREMITY  6/14/2012    Procedure: GRAFT SKIN SPLIT THICKNESS FROM EXTREMITY;;  Surgeon: Gerson Ravi MD;  [facility]     LAPAROSCOPIC ILEOSTOMY TAKEDOWN  6/6/2013    Procedure: LAPAROSCOPIC ILEOSTOMY TAKEDOWN;  Laparoscopic Closure of Enterostomy, Guerda's Type with IleoRectal Anastomosis ;  Surgeon: Grabiel Riddle MD;  [facility]     LAPAROTOMY EXPLORATORY  12/20/2012    Procedure: LAPAROTOMY EXPLORATORY;  Exploratory Laparotomy, total abdominal colectomy, ileostomy formation;  Surgeon: Miquel Cannon MD;  [facility]     LARYNGOSCOPY  6/14/2012    Procedure: LARYNGOSCOPY;;  Surgeon: Gerson Ravi MD;  [facility]     ORTHOPEDIC SURGERY      ganglian cyst left ankle     PANCREATECTOMY,  SPLENECTOMY N/A 3/10/2016    Procedure: PANCREATECTOMY, SPLENECTOMY;  Surgeon: Nael Abel MD;  [facility]     SHOULDER SURGERY  , 2008    2006- right rotator cuff,  bone spur on left. Dr. Hdez     VARICOCELECTOMY Left 2017    Procedure: VARICOCELECTOMY;  Left Varicocele Repair, Denervation of Left Testis;  Surgeon: Marcio Aggarwal MD;  [facility]        Family History   Problem Relation Age of Onset     Diabetes Sister         onset age 50     Alzheimer Disease Mother          80     Alzheimer Disease Father          85     Diabetes Other         nephew type 1     Diabetes Other      Aneurysm Sister      Anesthesia Reaction No family hx of      Colon Cancer No family hx of      Colon Polyps No family hx of      Crohn's Disease No family hx of      Ulcerative Colitis No family hx of        Social History     Socioeconomic History     Marital status:      Spouse name: Lily     Number of children: Not on file     Years of education: Not on file     Highest education level: Not on file   Occupational History     Occupation: [employer]     Comment: 20 hr/week monday-Thur 7-noon     Occupation: [place]      Employer: [employer]     Occupation: RETIRED   Tobacco Use     Smoking status: Former Smoker     Packs/day: 1.00     Years: 40.00     Pack years: 40.00     Types: Cigarettes     Quit date: 2006     Years since quittin.7     Smokeless tobacco: Never Used   Substance and Sexual Activity     Alcohol use: Yes     Drug use: Not on file     Sexual activity: Yes     Partners: Female   Other Topics Concern      Service Yes     Comment: [employer] 6 years     Blood Transfusions No     Caffeine Concern Yes     Comment: 0-2 cups     Occupational Exposure No     Comment: retired     Hobby Hazards No     Sleep Concern No     Stress Concern No     Weight Concern No     Special Diet Yes     Comment: healthy     Back Care No      Exercise Yes     Bike Helmet Not Asked     Comment: N/A     Seat Belt Yes     Self-Exams Yes     Parent/sibling w/ CABG, MI or angioplasty before 65F 55M? No   Social History Narrative    Son lives in [place] with 2 grandchildren 19 and 6     Social Determinants of Health     Financial Resource Strain: Low Risk      Difficulty of Paying Living Expenses: Not hard at all   Food Insecurity: No Food Insecurity     Worried About Running Out of Food in the Last Year: Never true     Ran Out of Food in the Last Year: Never true   Transportation Needs: No Transportation Needs     Lack of Transportation (Medical): No     Lack of Transportation (Non-Medical): No   Physical Activity:      Days of Exercise per Week:      Minutes of Exercise per Session:    Stress:      Feeling of Stress :    Social Connections:      Frequency of Communication with Friends and Family:      Frequency of Social Gatherings with Friends and Family:      Attends Restoration Services:      Active Member of Clubs or Organizations:      Attends Club or Organization Meetings:      Marital Status:    Intimate Partner Violence:      Fear of Current or Ex-Partner:      Emotionally Abused:      Physically Abused:      Sexually Abused:        Outpatient Encounter Medications as of 8/19/2021   Medication Sig Dispense Refill     ASPIRIN NOT PRESCRIBED (INTENTIONAL) Please choose reason not prescribed from choices below.       Continuous Blood Gluc Sensor ([employer]Fix[employer]comYLE LISA 14 DAY SENSOR) MISC USE 1 EVERY 14 DAYS. 2 each 3     insulin NPH (NOVOLIN N FLEXPEN RELION) 100 UNIT/ML injection Take 34 units of N in the am and 32 units in the pm. 30 mL 5     insulin pen needle (BD LUIS U/F) 32G X 4 MM miscellaneous USE PEN NEEDLE ONCE DAILY AS DIRECTED 60 each 0     LANTUS SOLOSTAR 100 UNIT/ML soln Take 26 units in the am and 26 units in the pm 15 mL 1     lisinopril (ZESTRIL) 5 MG tablet Take 1 tablet by mouth once daily 90 tablet 3     metoprolol succinate ER (TOPROL-XL)  50 MG 24 hr tablet TAKE 1 & 1/2 (ONE & ONE-HALF) TABLETS BY MOUTH TWICE DAILY 270 tablet 3     multivitamin, therapeutic with minerals (THERA-VIT-M) TABS Take 1 tablet by mouth daily. 30 each 1     pregabalin (LYRICA) 150 MG capsule Take 1 capsule (150 mg) by mouth 2 times daily 180 capsule 1     rosuvastatin (CRESTOR) 10 MG tablet Take 1 tablet (10 mg) by mouth daily 90 tablet 3     tamsulosin (FLOMAX) 0.4 MG capsule Take 1 capsule (0.4 mg) by mouth daily 90 capsule 3     vitamin B-12 (CYANOCOBALAMIN) 100 MCG tablet Take 100 mcg by mouth daily       warfarin ANTICOAGULANT (COUMADIN) 2.5 MG tablet TAKE 1 TABLET BY MOUTH ON TUES/THURS/SAT. TAKE 1/2 TABLET BY MOUTH ALL OTHER DAYS OR AS DIRECTED BY ANTICOAGULATION CLINIC. 70 tablet 0     No facility-administered encounter medications on file as of 8/19/2021.             O:   NAD, WDWN, Alert & Oriented, Mood & Affect wnl, Vitals stable   Here today alone   BP (!) 165/84   Pulse 81   SpO2 94%    General appearance normal   Vitals stable   Alert, oriented and in no acute distress     1.7 cm brown stuck on plaque on left medial cheek     Eyes: Conjunctivae/lids:Normal     ENT: Lips: normal    MSK:Normal    Pulm: Breathing Normal    Neuro/Psych: Orientation:Alert and Orientedx3 ; Mood/Affect:normal   A/P:  1.R/O SK on left medial cheek  TANGENTIAL BIOPSY SENT OUT:  After consent, anesthesia with LEC and prep, tangential excision performed and specimen sent out for permanent section histology.  No complications and routine wound care. Patient told to call our office in 1-2 weeks for result.      Poli or Bookem to follow up with Primary Care provider regarding elevated blood pressure.

## 2021-08-19 NOTE — ED TRIAGE NOTES
Pt had a lesion removed on his nose this morning today  Area s bleeding, controlled with direct pressure  On warfarin but didn't take today, took half dose last night  Took his metoprolol this morning  No airway compromise  No acute distress

## 2021-08-20 ENCOUNTER — PATIENT OUTREACH (OUTPATIENT)
Dept: CARE COORDINATION | Facility: CLINIC | Age: 76
End: 2021-08-20

## 2021-08-20 ENCOUNTER — DOCUMENTATION ONLY (OUTPATIENT)
Dept: ANTICOAGULATION | Facility: CLINIC | Age: 76
End: 2021-08-20

## 2021-08-20 DIAGNOSIS — Z71.89 OTHER SPECIFIED COUNSELING: ICD-10-CM

## 2021-08-20 LAB
PATH REPORT.COMMENTS IMP SPEC: NORMAL
PATH REPORT.COMMENTS IMP SPEC: NORMAL
PATH REPORT.FINAL DX SPEC: NORMAL
PATH REPORT.GROSS SPEC: NORMAL
PATH REPORT.MICROSCOPIC SPEC OTHER STN: NORMAL
PATH REPORT.RELEVANT HX SPEC: NORMAL

## 2021-08-20 NOTE — ED PROVIDER NOTES
History     Chief Complaint   Patient presents with     Wound Check     HPI  Antoine Russo is a 75 year old male who presents for evaluation of bleeding from his face.  The patient earlier today had a lesion removed from his face he is on warfarin for chronic atrial fibrillation.  No pain.  No fevers or rash.    Allergies:  Allergies   Allergen Reactions     Nkda [No Known Drug Allergies]        Problem List:    Patient Active Problem List    Diagnosis Date Noted     Anticipated difficulty with intubation 05/23/2017     Priority: High     Class: Chronic     Difficult two hands mask ventilation, intubated multiple times asleep with video laryngoscope. H/o tongue cancer surgery.        Acute on chronic pancreatitis (H) 02/16/2021     Priority: Medium     Alcohol abuse 02/16/2021     Priority: Medium     Leukocytosis 02/16/2021     Priority: Medium     Nausea with vomiting 02/16/2021     Priority: Medium     Dehydration 02/16/2021     Priority: Medium     Encounter for screening laboratory testing for COVID-19 virus 02/16/2021     Priority: Medium     Acute recurrent pancreatitis 10/17/2020     Priority: Medium     Acute gastritis without hemorrhage, unspecified gastritis type 10/17/2020     Priority: Medium     Added automatically from request for surgery [id number]       Acute gastritis 10/03/2020     Priority: Medium     Peripheral polyneuropathy 05/12/2020     Priority: Medium     Chronic low back pain with sciatica, sciatica laterality unspecified, unspecified back pain laterality 05/12/2020     Priority: Medium     Alcohol dependence, uncomplicated (H) 02/04/2020     Priority: Medium     Acute right-sided low back pain with left-sided sciatica 02/04/2020     Priority: Medium     Spinal stenosis of lumbar region, unspecified whether neurogenic claudication present 02/04/2020     Priority: Medium     Pancreatic pseudocyst 04/18/2019     Priority: Medium     Acute pancreatitis 10/21/2018     Priority: Medium      Long term current use of anticoagulant therapy 04/05/2018     Priority: Medium     Recurrent pancreatitis 03/30/2018     Priority: Medium     Chronic atrial fibrillation (H) 01/23/2018     Priority: Medium     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 Please refer if Care Coordination services are needed.    Presenting Problem Signs and Symptoms Treatment Plan   Questions or concerns   during clinic hours   I will call my clinic directly:  [address] [address]  [phone].   Questions or concerns outside clinic hours   I will call the 24 hour nurse line at   [phone] or [address] [place] [address].   Need to schedule an appointment   I will call the 24 hour scheduling team at [phone] or my clinic directly at [phone].   Same day treatment     I will call my clinic first, nurse line if after hours, urgent care and express care if needed.   Clinic care coordination services (regular clinic hours)   I will call a clinic care coordinator directly:     Shelia Emanuel RN [place] [facility]  [phone]    Brianna Cristobal SW:    [phone]    Or call my clinic at [phone] and ask to speak with care coordination.   Crisis Services: Behavioral or Mental Health  [facility] 24 Hour Phone Line  [phone]    [facility] 24 Hour Crisis Services  [phone]    [facility] [phone]    [facility]    [phone]     Emergency treatment -- Immediately    [facility] 911                Clostridium difficile enterocolitis 12/18/2012     Priority: Medium     Groin fluid collection 12/15/2012     Priority: Medium     CT 12/12- New (since 5/11) collection measuring at least 2.3 cm in diameter and 6.5 cm in length within the right inguinal canal. 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 Gerson Ravi and collekapil at the  in 2012  NAD 2017  CT scan of the neck every 2-3 years.  - Thyroid labs yearly.  - Carotid ultrasound in three to four years to evaluate for stenosis.       Hyperlipidemia LDL goal <100 10/31/2010     Priority: Medium     CAD (coronary artery disease) 05/26/2009     Priority: Medium     Stress testing 2009 showed inferior wall ischemia.  Cath preformed; identified moderate CAD with stenosis of 40-50% in LAD and RCA.  No stents were placed.  Echo in 01/2018 (done while in Afib with rates of 100-110); EF of 55-60%.  No RWMA.       GERD (gastroesophageal reflux disease) 05/26/2009     Priority: Medium     Hypertrophy of breast 09/25/2007     Priority: Medium     PERS HX TOBACCO USE - quit in 11/06 with chantix 03/15/2007     Priority: Medium     Hypertension, benign essential, goal below 140/90 11/07/2005     Priority: Medium     Patient has only fair bp control and with family history of diabetes will all ace if lab indicated also has bph and may op for psa and not digital exam next yr        Pain in joint, shoulder region 11/07/2005     Priority: Medium     Hypertrophy of prostate without urinary obstruction 11/07/2005     Priority:  Medium     Problem list name updated by automated process. 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 difficile colitis      Colon polyp      Colon polyp 8/26/2011     Coronary artery disease      Diabetes mellitus (H)      Diverticulitis      Diverticulitis of colon 7/17/2007     Hypertension      Leukocytosis 4/17/2019     Malignant neoplasm (H)      Noninfectious ileitis      Recurrent pancreatitis        Past Surgical History:    Past Surgical History:   Procedure Laterality Date     BREAST SURGERY  2008    right breast mass benign     COLONOSCOPY      multiple polyps removed     COLONOSCOPY  8/24/2011    Procedure:COMBINED COLONOSCOPY, REMOVE TUMOR/POLYP/LESION BY SNARE; Surgeon:MILEY ARBOLEDA; Location:[facility]     COLONOSCOPY  12/17/2012    Procedure: COLONOSCOPY;;  Surgeon: Leon Maurer MD;  Location:  GI     COLONOSCOPY  12/18/2012    Procedure: COLONOSCOPY;;  Surgeon: Leon Maurer MD;  [facility]     DENERVATION OF SPERMATIC CORD MICROSURGICAL Left 5/23/2017    Procedure: DENERVATION OF SPERMATIC CORD MICROSURGICAL;;  Surgeon: Marcio Aggarwal MD;  [facility]     DISSECTION RADICAL NECK BILATERAL  8/2/2012    Procedure: DISSECTION RADICAL NECK BILATERAL;;  Surgeon: Yung Alvares MD;  [facility]     ENDOSCOPIC RETROGRADE CHOLANGIOPANCREATOGRAM N/A 5/10/2016    Procedure: COMBINED ENDOSCOPIC RETROGRADE CHOLANGIOPANCREATOGRAPHY, PLACE TUBE/STENT;  Surgeon: Yovanny Beasley MD;  [facility]     ENDOSCOPIC RETROGRADE CHOLANGIOPANCREATOGRAM N/A 3/29/2018    Procedure: ENDOSCOPIC RETROGRADE CHOLANGIOPANCREATOGRAM;  Endoscopic Retrograde Cholangiopancreatogram, Endoscopic Ultrasound, Biliary Sphincterotomy, Biliary and  Pancreatic Stent Placement;  Surgeon: Yovanny Beasley MD;  [facility]     ENDOSCOPIC ULTRASOUND UPPER GASTROINTESTINAL TRACT (GI) N/A 2/3/2016    Procedure: ENDOSCOPIC ULTRASOUND, ESOPHAGOSCOPY / UPPER GASTROINTESTINAL TRACT (GI);  Surgeon: Grabiel Plata MD;  [facility]     ENDOSCOPIC ULTRASOUND UPPER GASTROINTESTINAL TRACT (GI) N/A 3/29/2018    Procedure: ENDOSCOPIC ULTRASOUND, ESOPHAGOSCOPY / UPPER GASTROINTESTINAL TRACT (GI);;  Surgeon: Grabiel Plata MD;  [facility]     ESOPHAGOSCOPY, GASTROSCOPY, DUODENOSCOPY (EGD), COMBINED N/A 2/3/2016    Procedure: COMBINED ENDOSCOPIC ULTRASOUND, ESOPHAGOSCOPY, GASTROSCOPY, DUODENOSCOPY (EGD), FINE NEEDLE ASPIRATE/BIOPSY;  Surgeon: Grabiel Plata MD;  [facility]     ESOPHAGOSCOPY, GASTROSCOPY, DUODENOSCOPY (EGD), COMBINED N/A 6/8/2016    Procedure: COMBINED ESOPHAGOSCOPY, GASTROSCOPY, DUODENOSCOPY (EGD), REMOVE FOREIGN BODY;  Surgeon: Yovanny Beasley MD;  [facility]     ESOPHAGOSCOPY, GASTROSCOPY, DUODENOSCOPY (EGD), COMBINED N/A 4/17/2018    Procedure: COMBINED ESOPHAGOSCOPY, GASTROSCOPY, DUODENOSCOPY (EGD), REMOVE FOREIGN BODY;  EGD with stent removal;  Surgeon: Grabiel Plata MD;  [facility]     EXCISE LESION INTRAORAL  6/14/2012    Procedure: EXCISE LESION INTRAORAL;  Wide Local Excision Floor of Mouth, Direct Laryngoscopy, Bilateral Concord's Marsuplization, Split Thickness Skin Graft from right Thigh  Latex Safe;  Surgeon: Gerson Ravi MD;  [facility]     EXCISE LESION INTRAORAL  8/2/2012    Procedure: EXCISE LESION INTRAORAL;  Floor of Mouth Resection, Bilateral Selective Radical Neck Dissection, Tracheostomy, Left Radial Forearm  Free Flap with Alloderm, Nasogastric Feeding Tube Placement,    * Latex Safe*;  Surgeon: Gerson Ravi MD;  [facility]     EXCISE LESION INTRAORAL  12/11/2012    Procedure: EXCISE LESION INTRAORAL;  takedown of oral flap;  Surgeon: Yung Alvares MD;   [facility]     GRAFT FREE VASCULARIZED (LOCATION)  2012    Procedure: GRAFT FREE VASCULARIZED (LOCATION);;  Surgeon: Yung Alvares MD;  [facility]     GRAFT SKIN SPLIT THICKNESS FROM EXTREMITY  2012    Procedure: GRAFT SKIN SPLIT THICKNESS FROM EXTREMITY;;  Surgeon: Gerson Ravi MD;  [facility]     LAPAROSCOPIC ILEOSTOMY TAKEDOWN  2013    Procedure: LAPAROSCOPIC ILEOSTOMY TAKEDOWN;  Laparoscopic Closure of Enterostomy, Guerda's Type with IleoRectal Anastomosis ;  Surgeon: Grabiel Riddle MD;  [facility]     LAPAROTOMY EXPLORATORY  2012    Procedure: LAPAROTOMY EXPLORATORY;  Exploratory Laparotomy, total abdominal colectomy, ileostomy formation;  Surgeon: Miquel Cannon MD;  [facility]     LARYNGOSCOPY  2012    Procedure: LARYNGOSCOPY;;  Surgeon: Gerson Ravi MD;  [facility]     ORTHOPEDIC SURGERY      ganglian cyst left ankle     PANCREATECTOMY, SPLENECTOMY N/A 3/10/2016    Procedure: PANCREATECTOMY, SPLENECTOMY;  Surgeon: Nael Abel MD;  [facility]     SHOULDER SURGERY  ,     2006- right rotator cuff,  bone spur on left. Dr. Hdez     VARICOCELECTOMY Left 2017    Procedure: VARICOCELECTOMY;  Left Varicocele Repair, Denervation of Left Testis;  Surgeon: Marcio Aggarwal MD;  [facility]       Family History:    Family History   Problem Relation Age of Onset     Diabetes Sister         onset age 50     Alzheimer Disease Mother          80     Alzheimer Disease Father          85     Diabetes Other         nephew type 1     Diabetes Other      Aneurysm Sister      Anesthesia Reaction No family hx of      Colon Cancer No family hx of      Colon Polyps No family hx of      Crohn's Disease No family hx of      Ulcerative Colitis No family hx of        Social History:  Marital Status:   [2]  Social History     Tobacco Use     Smoking status: Former Smoker     Packs/day: 1.00     Years: 40.00     Pack  years: 40.00     Types: Cigarettes     Quit date: 2006     Years since quittin.7     Smokeless tobacco: Never Used   Substance Use Topics     Alcohol use: Yes     Drug use: Not on file        Medications:    ASPIRIN NOT PRESCRIBED (INTENTIONAL)  Continuous Blood Gluc Sensor (FREESTYLE LISA 14 DAY SENSOR) MISC  insulin NPH (NOVOLIN N FLEXPEN RELION) 100 UNIT/ML injection  insulin pen needle (BD LUIS U/F) 32G X 4 MM miscellaneous  LANTUS SOLOSTAR 100 UNIT/ML soln  lisinopril (ZESTRIL) 5 MG tablet  metoprolol succinate ER (TOPROL-XL) 50 MG 24 hr tablet  multivitamin, therapeutic with minerals (THERA-VIT-M) TABS  pregabalin (LYRICA) 150 MG capsule  rosuvastatin (CRESTOR) 10 MG tablet  tamsulosin (FLOMAX) 0.4 MG capsule  vitamin B-12 (CYANOCOBALAMIN) 100 MCG tablet  warfarin ANTICOAGULANT (COUMADIN) 2.5 MG tablet          Review of Systems  A 2 point review of systems was performed. All pertinent positives and negatives were listed in the HPI and rest of ROS were otherwise negative.    Physical Exam   BP: (!) 196/72  Pulse: 89  Temp: 97  F (36.1  C)  Resp: 16  Weight: 90.7 kg (200 lb)  SpO2: 94 %      Physical Exam  Constitutional:       General: He is not in acute distress.     Appearance: He is well-developed. He is not diaphoretic.   HENT:      Head: Normocephalic and atraumatic.   Eyes:      General: No scleral icterus.  Musculoskeletal:      Cervical back: Normal range of motion and neck supple.   Skin:     General: Skin is warm and dry.      Findings: No rash.      Comments: Well-appearing surgical incision to the left cheek with small amount of blood oozing from the wound, no surrounding erythema or pain.   Neurological:      Mental Status: He is alert and oriented to person, place, and time.         ED Course        Procedures              Critical Care time:  none               No results found. However, due to the size of the patient record, not all encounters were searched. Please check Results  Review for a complete set of results.    Medications - No data to display    Assessments & Plan (with Medical Decision Making)   75-year-old male with a history of atrial fibrillation on warfarin who presents for bleeding from his surgical site wound at his cheek earlier today.  The dressing is changed and a new dressing is placed with Surgicel.  He was directed to hold direct pressure against the site.  On recheck the bleeding was controlled with direct pressure.  He was watched in the emergency department, no further bleeding and he is safe to discharge home with instructions to return if the bleeding returns and cannot be stopped with direct pressure, otherwise follow-up in clinic.  The patient is in agreement with this plan.    I have reviewed the nursing notes.    I have reviewed the findings, diagnosis, plan and need for follow up with the patient.       New Prescriptions    No medications on file       Final diagnoses:   Visit for wound check       8/19/2021   [facility] EMERGENCY DEPT     Michael Turcios MD  08/19/21 [id number]

## 2021-08-20 NOTE — DISCHARGE INSTRUCTIONS
Return to the emergency department for repeated episodes of bleeding that do not stop after 15 minutes of direct pressure, fever, chills, spreading rash, increasing pain, or other concerns.  Otherwise follow-up in clinic.

## 2021-08-20 NOTE — PROGRESS NOTES
ANTICOAGULATION  MANAGEMENT: Discharge Review    Antoine JESSICA Russo chart reviewed for anticoagulation continuity of care    Emergency room visit on 8/19/21 for Wound Check.    Discharge disposition: Home    Results:    No results for input(s): INR, [id number], ALMWH, AAUFH in the last 168 hours.  Anticoagulation inpatient management:     not applicable     Anticoagulation discharge instructions:     Warfarin dosing: home regimen continued   Bridging: No   INR goal change: No      Medication changes affecting anticoagulation: No    Additional factors affecting anticoagulation: No    Plan     No adjustment to anticoagulation plan needed    Patient not contacted    No adjustment to Anticoagulation Calendar was required    Angela Harrell RN

## 2021-08-20 NOTE — PROGRESS NOTES
Clinic Care Coordination Contact  [facility]: Post-Discharge Note  SITUATION                                                      Admission:    Admission Date: 08/19/21   Reason for Admission: wound check  Discharge:   Discharge Date: 08/19/21  Discharge Diagnosis: wound check    BACKGROUND                                                      Antoine Russo is a 75 year old male who presents for evaluation of bleeding from his face.  The patient earlier today had a lesion removed from his face he is on warfarin for chronic atrial fibrillation.    ASSESSMENT        Discharge Assessment  How are you doing now that you are home?: good, got it under control now.  How are your symptoms? (Red Flag symptoms escalate to triage hotline per guidelines): Improved  Does the patient have their discharge instructions? : Yes  Does the patient have questions regarding their discharge instructions? : No  Were you started on any new medications or were there changes to any of your previous medications? : No  Does the patient have all of their medications?: Yes  Do you have questions regarding any of your medications? : No  Do you have all of your needed medical supplies or equipment (DME)?  (i.e. oxygen tank, CPAP, cane, etc.): Yes  Discharge follow-up appointment scheduled within 14 calendar days? : No  Is patient agreeable to assistance with scheduling? : No    Post-op (Clinicians Only)  Fever: No  Chills: No  Eating & Drinking: eating and drinking without complaints/concerns  PO Intake: regular diet  Bowel Function: normal  Date of last BM: 08/20/21  Urinary Status: voiding without complaint/concerns    PLAN                                                      Outpatient Plan:      Return if the bleeding returns and cannot be stopped with direct pressure, otherwise follow-up in clinic.    Future Appointments   Date Time Provider Department Center   8/25/2021  7:30 AM [facility]   10/14/2021  7:00 AM [place]  Katie Okeefe MD [facility]         For any urgent concerns, please contact our 24 hour nurse triage line: [phone] ([phone])       Keesha Teague  Community Health Worker  [facility]  Ph: [phone]

## 2021-08-25 ENCOUNTER — ANTICOAGULATION THERAPY VISIT (OUTPATIENT)
Dept: FAMILY MEDICINE | Facility: CLINIC | Age: 76
End: 2021-08-25

## 2021-08-25 ENCOUNTER — LAB (OUTPATIENT)
Dept: LAB | Facility: CLINIC | Age: 76
End: 2021-08-25
Payer: COMMERCIAL

## 2021-08-25 DIAGNOSIS — Z79.01 LONG TERM CURRENT USE OF ANTICOAGULANT THERAPY: Chronic | ICD-10-CM

## 2021-08-25 DIAGNOSIS — Z79.01 LONG TERM CURRENT USE OF ANTICOAGULANT THERAPY: ICD-10-CM

## 2021-08-25 DIAGNOSIS — I48.20 CHRONIC ATRIAL FIBRILLATION (H): Primary | ICD-10-CM

## 2021-08-25 DIAGNOSIS — I48.20 CHRONIC ATRIAL FIBRILLATION (H): Chronic | ICD-10-CM

## 2021-08-25 LAB — INR BLD: 2.8 (ref 0.9–1.1)

## 2021-08-25 PROCEDURE — 85610 PROTHROMBIN TIME: CPT

## 2021-08-25 PROCEDURE — 36416 COLLJ CAPILLARY BLOOD SPEC: CPT

## 2021-08-25 NOTE — PROGRESS NOTES
ANTICOAGULATION MANAGEMENT     Antoine LOPEZ Rafaela 75 year old male is on warfarin with therapeutic INR result. (Goal INR 2.0-3.0)    Recent labs: (last 7 days)     08/25/21  0723   INR 2.8*       ASSESSMENT     Source(s): Chart Review and Patient/Caregiver Call       Warfarin doses taken: Warfarin taken as instructed    Diet: No new diet changes identified    New illness, injury, or hospitalization: No    Medication/supplement changes: None noted    Signs or symptoms of bleeding or clotting: No    Previous INR: Therapeutic last 2(+) visits    Additional findings: None     PLAN     Recommended plan for no diet, medication or health factor changes affecting INR     Dosing Instructions: Continue your current warfarin dose with next INR in 6 weeks       Summary  As of 8/25/2021    Full warfarin instructions:  2.5 mg every Tue, Thu, Sat; 1.25 mg all other days   Next INR check:  10/5/2021             Telephone call with Antoine who verbalizes understanding and agrees to plan    Lab visit scheduled    Education provided: Please call back if any changes to your diet, medications or how you've been taking warfarin    Plan made per [facility] anticoagulation protocol    Madhavi Sanders RN  Anticoagulation Clinic  8/25/2021    _______________________________________________________________________     Anticoagulation Episode Summary     Current INR goal:  2.0-3.0   TTR:  90.5 % (11.5 mo)   Target end date:  Indefinite   Send INR reminders to:  [facility]    Indications    Chronic atrial fibrillation (H) [I48.20]  Long term current use of anticoagulant therapy [Z79.01]           Comments:  *          Anticoagulation Care Providers     Provider Role Specialty Phone number    Katie Martino MD Referring Family Medicine [phone]

## 2021-08-30 ENCOUNTER — HOSPITAL ENCOUNTER (EMERGENCY)
Facility: CLINIC | Age: 76
Discharge: HOME | End: 2021-08-30
Attending: EMERGENCY MEDICINE | Admitting: EMERGENCY MEDICINE
Payer: COMMERCIAL

## 2021-08-30 ENCOUNTER — ANTICOAGULATION THERAPY VISIT (OUTPATIENT)
Dept: ANTICOAGULATION | Facility: CLINIC | Age: 76
End: 2021-08-30

## 2021-08-30 VITALS
BODY MASS INDEX: 29.62 KG/M2 | HEIGHT: 69 IN | WEIGHT: 200 LBS | HEART RATE: 81 BPM | OXYGEN SATURATION: 92 % | SYSTOLIC BLOOD PRESSURE: 160 MMHG | DIASTOLIC BLOOD PRESSURE: 92 MMHG | TEMPERATURE: 97.7 F | RESPIRATION RATE: 16 BRPM

## 2021-08-30 DIAGNOSIS — K85.90 ACUTE RECURRENT PANCREATITIS: ICD-10-CM

## 2021-08-30 DIAGNOSIS — F10.29 ALCOHOL DEPENDENCE WITH UNSPECIFIED ALCOHOL-INDUCED DISORDER (H): ICD-10-CM

## 2021-08-30 DIAGNOSIS — Z79.01 LONG TERM CURRENT USE OF ANTICOAGULANT THERAPY: ICD-10-CM

## 2021-08-30 DIAGNOSIS — I48.20 CHRONIC ATRIAL FIBRILLATION (H): Primary | ICD-10-CM

## 2021-08-30 LAB
ALBUMIN SERPL-MCNC: 3.3 G/DL (ref 3.4–5)
ALBUMIN UR-MCNC: 100 MG/DL
ALP SERPL-CCNC: 65 U/L (ref 40–150)
ALT SERPL W P-5'-P-CCNC: 37 U/L (ref 0–70)
ANION GAP SERPL CALCULATED.3IONS-SCNC: 5 MMOL/L (ref 3–14)
APPEARANCE UR: CLEAR
AST SERPL W P-5'-P-CCNC: 31 U/L (ref 0–45)
BASOPHILS # BLD MANUAL: 0 10E3/UL (ref 0–0.2)
BASOPHILS NFR BLD MANUAL: 0 %
BILIRUB SERPL-MCNC: 0.8 MG/DL (ref 0.2–1.3)
BILIRUB UR QL STRIP: NEGATIVE
BUN SERPL-MCNC: 14 MG/DL (ref 7–30)
CALCIUM SERPL-MCNC: 9.1 MG/DL (ref 8.5–10.1)
CHLORIDE BLD-SCNC: 102 MMOL/L (ref 94–109)
CO2 SERPL-SCNC: 27 MMOL/L (ref 20–32)
COLOR UR AUTO: YELLOW
CREAT SERPL-MCNC: 0.96 MG/DL (ref 0.66–1.25)
EOSINOPHIL # BLD MANUAL: 0 10E3/UL (ref 0–0.7)
EOSINOPHIL NFR BLD MANUAL: 0 %
ERYTHROCYTE [DISTWIDTH] IN BLOOD BY AUTOMATED COUNT: 15.2 % (ref 10–15)
ETHANOL SERPL-MCNC: <0.01 G/DL
GFR SERPL CREATININE-BSD FRML MDRD: 78 ML/MIN/1.73M2
GLUCOSE BLD-MCNC: 189 MG/DL (ref 70–99)
GLUCOSE UR STRIP-MCNC: 50 MG/DL
HCT VFR BLD AUTO: 50.7 % (ref 40–53)
HGB BLD-MCNC: 16.8 G/DL (ref 13.3–17.7)
HGB UR QL STRIP: ABNORMAL
HOWELL-JOLLY BOD BLD QL SMEAR: PRESENT
HYALINE CASTS: 1 /LPF
KETONES UR STRIP-MCNC: NEGATIVE MG/DL
LACTATE SERPL-SCNC: 1.9 MMOL/L (ref 0.7–2)
LEUKOCYTE ESTERASE UR QL STRIP: NEGATIVE
LIPASE SERPL-CCNC: 887 U/L (ref 73–393)
LYMPHOCYTES # BLD MANUAL: 3.1 10E3/UL (ref 0.8–5.3)
LYMPHOCYTES NFR BLD MANUAL: 22 %
MCH RBC QN AUTO: 32.9 PG (ref 26.5–33)
MCHC RBC AUTO-ENTMCNC: 33.1 G/DL (ref 31.5–36.5)
MCV RBC AUTO: 99 FL (ref 78–100)
MONOCYTES # BLD MANUAL: 1 10E3/UL (ref 0–1.3)
MONOCYTES NFR BLD MANUAL: 7 %
NEUTROPHILS # BLD MANUAL: 9.9 10E3/UL (ref 1.6–8.3)
NEUTROPHILS NFR BLD MANUAL: 71 %
NITRATE UR QL: NEGATIVE
PH UR STRIP: 6 [PH] (ref 5–7)
PLAT MORPH BLD: ABNORMAL
PLATELET # BLD AUTO: 227 10E3/UL (ref 150–450)
POTASSIUM BLD-SCNC: 4.7 MMOL/L (ref 3.4–5.3)
PROT SERPL-MCNC: 7.8 G/DL (ref 6.8–8.8)
RBC # BLD AUTO: 5.1 10E6/UL (ref 4.4–5.9)
RBC MORPH BLD: ABNORMAL
RBC URINE: 1 /HPF
SODIUM SERPL-SCNC: 134 MMOL/L (ref 133–144)
SP GR UR STRIP: 1.01 (ref 1–1.03)
UROBILINOGEN UR STRIP-MCNC: NORMAL MG/DL
WBC # BLD AUTO: 14 10E3/UL (ref 4–11)
WBC URINE: <1 /HPF

## 2021-08-30 PROCEDURE — 96361 HYDRATE IV INFUSION ADD-ON: CPT | Performed by: EMERGENCY MEDICINE

## 2021-08-30 PROCEDURE — 96360 HYDRATION IV INFUSION INIT: CPT | Performed by: EMERGENCY MEDICINE

## 2021-08-30 PROCEDURE — 83605 ASSAY OF LACTIC ACID: CPT | Performed by: EMERGENCY MEDICINE

## 2021-08-30 PROCEDURE — 36415 COLL VENOUS BLD VENIPUNCTURE: CPT | Performed by: EMERGENCY MEDICINE

## 2021-08-30 PROCEDURE — 83690 ASSAY OF LIPASE: CPT | Performed by: EMERGENCY MEDICINE

## 2021-08-30 PROCEDURE — 82040 ASSAY OF SERUM ALBUMIN: CPT | Performed by: EMERGENCY MEDICINE

## 2021-08-30 PROCEDURE — 85027 COMPLETE CBC AUTOMATED: CPT | Performed by: EMERGENCY MEDICINE

## 2021-08-30 PROCEDURE — 258N000003 HC RX IP 258 OP 636: Performed by: EMERGENCY MEDICINE

## 2021-08-30 PROCEDURE — 99283 EMERGENCY DEPT VISIT LOW MDM: CPT | Mod: 25 | Performed by: EMERGENCY MEDICINE

## 2021-08-30 PROCEDURE — 99284 EMERGENCY DEPT VISIT MOD MDM: CPT | Performed by: EMERGENCY MEDICINE

## 2021-08-30 PROCEDURE — 82077 ASSAY SPEC XCP UR&BREATH IA: CPT | Performed by: EMERGENCY MEDICINE

## 2021-08-30 PROCEDURE — 81001 URINALYSIS AUTO W/SCOPE: CPT | Performed by: EMERGENCY MEDICINE

## 2021-08-30 PROCEDURE — 87040 BLOOD CULTURE FOR BACTERIA: CPT | Performed by: EMERGENCY MEDICINE

## 2021-08-30 RX ORDER — ONDANSETRON 4 MG/1
4 TABLET, ORALLY DISINTEGRATING ORAL EVERY 8 HOURS PRN
Qty: 15 TABLET | Refills: 0 | Status: SHIPPED | OUTPATIENT
Start: 2021-08-30 | End: 2021-10-14

## 2021-08-30 RX ORDER — OXYCODONE AND ACETAMINOPHEN 5; 325 MG/1; MG/1
1-2 TABLET ORAL EVERY 6 HOURS PRN
Qty: 12 TABLET | Refills: 0 | Status: SHIPPED | OUTPATIENT
Start: 2021-08-30 | End: 2021-10-14

## 2021-08-30 RX ADMIN — SODIUM CHLORIDE, POTASSIUM CHLORIDE, SODIUM LACTATE AND CALCIUM CHLORIDE 1000 ML: 600; 310; 30; 20 INJECTION, SOLUTION INTRAVENOUS at 12:20

## 2021-08-30 ASSESSMENT — MIFFLIN-ST. JEOR: SCORE: 1642.57

## 2021-08-30 NOTE — ED PROVIDER NOTES
"  History     Chief Complaint   Patient presents with     Abdominal Pain     acute on chronic pancreatitis      History per patient, his wife and review of EMR.    HPI  Antoine Russo is a 75 year old male with history of alcohol dependence abuse, alcoholic pancreatitis and recurrent pancreatitis with typical epigastric and medial left upper quadrant pain of pancreatitis which began insidiously yesterday, is waxing and waning intensity and now currently absent. He has nausea but no vomiting.  No fever or chills.  No signs or symptoms of GI bleeding.  No UTI signs or symptoms or hematuria.  No back or flank pain.  No infectious signs or symptoms or other acute complaints or concerns.  He continues to drink alcohol, states that he is only drinking beer and last beer consumption was yesterday.  He drinks beer daily, \"a couple\" beers daily.    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 H/o tongue cancer surgery.        Acute on chronic pancreatitis (H) 02/16/2021     Priority: Medium     Alcohol abuse 02/16/2021     Priority: Medium     Leukocytosis 02/16/2021     Priority: Medium     Nausea with vomiting 02/16/2021     Priority: Medium     Dehydration 02/16/2021     Priority: Medium     Encounter for screening laboratory testing for COVID-19 virus 02/16/2021     Priority: Medium     Acute recurrent pancreatitis 10/17/2020     Priority: Medium     Acute gastritis without hemorrhage, unspecified gastritis type 10/17/2020     Priority: Medium     Added automatically from request " for surgery [id number]       Acute gastritis 10/03/2020     Priority: Medium     Peripheral polyneuropathy 05/12/2020     Priority: Medium     Chronic low back pain with sciatica, sciatica laterality unspecified, unspecified back pain laterality 05/12/2020     Priority: Medium     Alcohol dependence, uncomplicated (H) 02/04/2020     Priority: Medium     Acute right-sided low back pain with left-sided sciatica 02/04/2020     Priority: Medium     Spinal stenosis of lumbar region, unspecified whether neurogenic claudication present 02/04/2020     Priority: Medium     Pancreatic pseudocyst 04/18/2019     Priority: Medium     Acute pancreatitis 10/21/2018     Priority: Medium     Long term current use of anticoagulant therapy 04/05/2018     Priority: Medium     Recurrent pancreatitis 03/30/2018     Priority: Medium     Chronic atrial fibrillation (H) 01/23/2018     Priority: Medium     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 Please refer if Care Coordination services are needed.    Presenting Problem Signs and Symptoms Treatment Plan   Questions or concerns   during clinic hours   I will call my clinic directly:  [address] [address]  [phone].   Questions or concerns outside clinic hours   I will call the 24 hour nurse line at   [phone] or [phone].   Need to schedule an appointment   I will call the 24 hour scheduling team at [phone] or my  clinic directly at [phone].   Same day treatment     I will call my clinic first, nurse line if after hours, urgent care and express care if needed.   Clinic care coordination services (regular clinic hours)   I will call a clinic care coordinator directly:     Shelia Emanuel RN [facility]  [phone]    Brianna Cristobal, :    [phone]    Or call my clinic at [phone] and ask to speak with care coordination.   Crisis Services: Behavioral or Mental Health  [facility] 24 Hour Phone Line  [phone]    [facility] 24 Hour Crisis Services  [phone]    [facility] (Behavioral Health Providers) Network [phone]    [facility]   [phone]     Emergency treatment -- Immediately    [facility] 911                Clostridium difficile enterocolitis 12/18/2012     Priority: Medium     Groin fluid collection 12/15/2012     Priority: Medium     CT 12/12- New (since 5/11) collection measuring at least 2.3 cm in diameter and 6.5 cm in length within the right inguinal canal. 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 Gerson Ravi and aristides at the  in 2012  NAD 2017  CT scan of the neck every 2-3 years.  - Thyroid labs yearly.  - Carotid ultrasound in three to four years to evaluate for stenosis.       Hyperlipidemia LDL goal <100 10/31/2010     Priority: Medium     CAD (coronary artery disease) 05/26/2009     Priority: Medium     Stress testing 2009 showed inferior wall ischemia.  Cath preformed; identified moderate CAD with stenosis of 40-50% in LAD and RCA.  No stents were placed.  Echo in 01/2018 (done while in Af[facility]  with rates of 100-110); EF of 55-60%.  No RWMA.       GERD (gastroesophageal reflux disease) 05/26/2009     Priority: Medium     Hypertrophy of breast 09/25/2007     Priority: Medium     PERS HX TOBACCO USE - quit in 11/06 with chantix 03/15/2007     Priority: Medium     Hypertension, benign essential, goal below 140/90 11/07/2005     Priority: Medium     Patient has only fair bp control and with family history of diabetes will all ace if lab indicated also has bph and may op for psa and not digital exam next yr        Pain in joint, shoulder region 11/07/2005     Priority: Medium     Hypertrophy of prostate without urinary obstruction 11/07/2005     Priority: Medium     Problem list name updated by automated process. 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 difficile colitis      Colon polyp      Colon polyp 8/26/2011     Coronary artery disease      Diabetes mellitus (H)      Diverticulitis      Diverticulitis of colon 7/17/2007     Hypertension      Leukocytosis 4/17/2019     Malignant neoplasm (H)      Noninfectious ileitis      Recurrent pancreatitis        Past Surgical History:    Past Surgical History:   Procedure Laterality Date     BREAST SURGERY  2008    right breast mass benign     COLONOSCOPY      multiple polyps removed     COLONOSCOPY  8/24/2011    Procedure:COMBINED COLONOSCOPY, REMOVE TUMOR/POLYP/LESION BY SNARE; Surgeon:MILEY ARBOLEDA; Location:[facility]     COLONOSCOPY  12/17/2012    Procedure: COLONOSCOPY;;  Surgeon: Leon Maurer MD;  Location:  GI     COLONOSCOPY  12/18/2012    Procedure: COLONOSCOPY;;  Surgeon: Leon Maurer MD;  Location:  GI     DENERVATION OF SPERMATIC CORD MICROSURGICAL Left 5/23/2017    Procedure: DENERVATION OF  SPERMATIC CORD MICROSURGICAL;;  Surgeon: Marcio Aggarwal MD;  [facility]     DISSECTION RADICAL NECK BILATERAL  8/2/2012    Procedure: DISSECTION RADICAL NECK BILATERAL;;  Surgeon: Yung Alvares MD;  [facility]     ENDOSCOPIC RETROGRADE CHOLANGIOPANCREATOGRAM N/A 5/10/2016    Procedure: COMBINED ENDOSCOPIC RETROGRADE CHOLANGIOPANCREATOGRAPHY, PLACE TUBE/STENT;  Surgeon: Yovanny Beasley MD;  [facility]     ENDOSCOPIC RETROGRADE CHOLANGIOPANCREATOGRAM N/A 3/29/2018    Procedure: ENDOSCOPIC RETROGRADE CHOLANGIOPANCREATOGRAM;  Endoscopic Retrograde Cholangiopancreatogram, Endoscopic Ultrasound, Biliary Sphincterotomy, Biliary and Pancreatic Stent Placement;  Surgeon: Yovanny Beasley MD;  [facility]     ENDOSCOPIC ULTRASOUND UPPER GASTROINTESTINAL TRACT (GI) N/A 2/3/2016    Procedure: ENDOSCOPIC ULTRASOUND, ESOPHAGOSCOPY / UPPER GASTROINTESTINAL TRACT (GI);  Surgeon: Grabiel Plata MD;  [facility]     ENDOSCOPIC ULTRASOUND UPPER GASTROINTESTINAL TRACT (GI) N/A 3/29/2018    Procedure: ENDOSCOPIC ULTRASOUND, ESOPHAGOSCOPY / UPPER GASTROINTESTINAL TRACT (GI);;  Surgeon: Grabiel Plata MD;  [facility]     ESOPHAGOSCOPY, GASTROSCOPY, DUODENOSCOPY (EGD), COMBINED N/A 2/3/2016    Procedure: COMBINED ENDOSCOPIC ULTRASOUND, ESOPHAGOSCOPY, GASTROSCOPY, DUODENOSCOPY (EGD), FINE NEEDLE ASPIRATE/BIOPSY;  Surgeon: Grabiel Plata MD;  [facility]     ESOPHAGOSCOPY, GASTROSCOPY, DUODENOSCOPY (EGD), COMBINED N/A 6/8/2016    Procedure: COMBINED ESOPHAGOSCOPY, GASTROSCOPY, DUODENOSCOPY (EGD), REMOVE FOREIGN BODY;  Surgeon: Yovanny Beasley MD;  [facility]     ESOPHAGOSCOPY, GASTROSCOPY, DUODENOSCOPY (EGD), COMBINED N/A 4/17/2018    Procedure: COMBINED ESOPHAGOSCOPY, GASTROSCOPY, DUODENOSCOPY (EGD), REMOVE FOREIGN BODY;  EGD with stent removal;  Surgeon: Grabiel Plata MD;  [facility]     EXCISE LESION INTRAORAL  6/14/2012    Procedure:  EXCISE LESION INTRAORAL;  Wide Local Excision Floor of Mouth, Direct Laryngoscopy, Bilateral Ida's Marsuplization, Split Thickness Skin Graft from right Thigh  Latex Safe;  Surgeon: Gerson Ravi MD;  [facility]     EXCISE LESION INTRAORAL  8/2/2012    Procedure: EXCISE LESION INTRAORAL;  Floor of Mouth Resection, Bilateral Selective Radical Neck Dissection, Tracheostomy, Left Radial Forearm  Free Flap with Alloderm, Nasogastric Feeding Tube Placement,    * Latex Safe*;  Surgeon: Gerson Ravi MD;  [facility]     EXCISE LESION INTRAORAL  12/11/2012    Procedure: EXCISE LESION INTRAORAL;  takedown of oral flap;  Surgeon: Yugn Alvares MD;  [facility]     GRAFT FREE VASCULARIZED (LOCATION)  8/2/2012    Procedure: GRAFT FREE VASCULARIZED (LOCATION);;  Surgeon: Yung Alvares MD;  [facility]     GRAFT SKIN SPLIT THICKNESS FROM EXTREMITY  6/14/2012    Procedure: GRAFT SKIN SPLIT THICKNESS FROM EXTREMITY;;  Surgeon: Gerson Ravi MD;  [facility]     LAPAROSCOPIC ILEOSTOMY TAKEDOWN  6/6/2013    Procedure: LAPAROSCOPIC ILEOSTOMY TAKEDOWN;  Laparoscopic Closure of Enterostomy, Guerda's Type with IleoRectal Anastomosis ;  Surgeon: Grabiel Riddle MD;  [facility]     LAPAROTOMY EXPLORATORY  12/20/2012    Procedure: LAPAROTOMY EXPLORATORY;  Exploratory Laparotomy, total abdominal colectomy, ileostomy formation;  Surgeon: Miquel Cannon MD;  [facility]     LARYNGOSCOPY  6/14/2012    Procedure: LARYNGOSCOPY;;  Surgeon: Gerson Ravi MD;  [facility]     ORTHOPEDIC SURGERY      ganglian cyst left ankle     PANCREATECTOMY, SPLENECTOMY N/A 3/10/2016    Procedure: PANCREATECTOMY, SPLENECTOMY;  Surgeon: Nael Abel MD;  [facility]     SHOULDER SURGERY  2006, 2008    2006- right rotator cuff, 2008 bone spur on left. Dr. Hdez     VARICOCELECTOMY Left 5/23/2017    Procedure: VARICOCELECTOMY;  Left Varicocele Repair, Denervation of Left Testis;  Surgeon: Marcio Aggarwal  "MD Kj;  [facility]       Family History:    Family History   Problem Relation Age of Onset     Diabetes Sister         onset age 50     Alzheimer Disease Mother          80     Alzheimer Disease Father          85     Diabetes Other         nephew type 1     Diabetes Other      Aneurysm Sister      Anesthesia Reaction No family hx of      Colon Cancer No family hx of      Colon Polyps No family hx of      Crohn's Disease No family hx of      Ulcerative Colitis No family hx of        Social History:  Marital Status:   [2]  Social History     Tobacco Use     Smoking status: Former Smoker     Packs/day: 1.00     Years: 40.00     Pack years: 40.00     Types: Cigarettes     Quit date: 2006     Years since quittin.7     Smokeless tobacco: Never Used   Substance Use Topics     Alcohol use: Yes     Drug use: Not on file        Medications:    Continuous Blood Gluc Sensor ([employer] LISA 14 DAY SENSOR) MISC  insulin NPH (NOVOLIN N FLEXPEN RELION) 100 UNIT/ML injection  insulin pen needle (BD LUIS U/F) 32G X 4 MM miscellaneous  lisinopril (ZESTRIL) 5 MG tablet  metoprolol succinate ER (TOPROL-XL) 50 MG 24 hr tablet  multivitamin, therapeutic with minerals (THERA-VIT-M) TABS  ondansetron (ZOFRAN ODT) 4 MG ODT tab  oxyCODONE-acetaminophen (PERCOCET) 5-325 MG tablet  pregabalin (LYRICA) 150 MG capsule  rosuvastatin (CRESTOR) 10 MG tablet  tamsulosin (FLOMAX) 0.4 MG capsule  vitamin B-12 (CYANOCOBALAMIN) 100 MCG tablet  warfarin ANTICOAGULANT (COUMADIN) 2.5 MG tablet  ASPIRIN NOT PRESCRIBED (INTENTIONAL)  LANTUS SOLOSTAR 100 UNIT/ML soln          Review of Systems  As mentioned above in the history present illness.  All other systems were reviewed and are negative.    Physical Exam   BP: (!) 150/87  Pulse: 100  Temp: 97.7  F (36.5  C)  Resp: 16  Height: 175.3 cm (5' 9\")  Weight: 90.7 kg (200 lb)  SpO2: 94 %      Physical Exam  Vitals and nursing note reviewed.   Constitutional:       General: He " is not in acute distress.     Appearance: Normal appearance. He is well-developed. He is not ill-appearing or diaphoretic.   HENT:      Head: Normocephalic and atraumatic.      Right Ear: External ear normal.      Left Ear: External ear normal.      Nose: Nose normal.      Mouth/Throat:      Mouth: Mucous membranes are moist.   Eyes:      General: No scleral icterus.     Extraocular Movements: Extraocular movements intact.      Conjunctiva/sclera: Conjunctivae normal.   Neck:      Trachea: No tracheal deviation.   Cardiovascular:      Rate and Rhythm: Normal rate and regular rhythm.      Heart sounds: Normal heart sounds. No murmur heard.   No friction rub. No gallop.    Pulmonary:      Effort: Pulmonary effort is normal. No respiratory distress.      Breath sounds: Normal breath sounds. No wheezing, rhonchi or rales.   Abdominal:      General: Bowel sounds are normal. There is no distension or abdominal bruit.      Palpations: Abdomen is soft. There is no pulsatile mass.      Tenderness: There is no abdominal tenderness.      Hernia: No hernia is present.   Musculoskeletal:         General: No tenderness. Normal range of motion.      Cervical back: Normal range of motion and neck supple.      Right lower leg: No edema.      Left lower leg: No edema.   Skin:     General: Skin is warm and dry.      Coloration: Skin is not pale.      Findings: No erythema or rash.   Neurological:      General: No focal deficit present.      Mental Status: He is alert and oriented to person, place, and time.      Coordination: Coordination normal.   Psychiatric:         Mood and Affect: Mood normal.         Behavior: Behavior normal.         ED Course        Procedures            Results for orders placed or performed during the hospital encounter of 08/30/21 (from the past 24 hour(s))   CBC with platelets differential    Narrative    The following orders were created for panel order CBC with platelets differential.  Procedure                                Abnormality         Status                     ---------                               -----------         ------                     CBC with platelets and d...[[id number]]  Abnormal            Final result               Manual Differential[[id number]]          Abnormal            Final result                 Please view results for these tests on the individual orders.   Comprehensive metabolic panel   Result Value Ref Range    Sodium 134 133 - 144 mmol/L    Potassium 4.7 3.4 - 5.3 mmol/L    Chloride 102 94 - 109 mmol/L    Carbon Dioxide (CO2) 27 20 - 32 mmol/L    Anion Gap 5 3 - 14 mmol/L    Urea Nitrogen 14 7 - 30 mg/dL    Creatinine 0.96 0.66 - 1.25 mg/dL    Calcium 9.1 8.5 - 10.1 mg/dL    Glucose 189 (H) 70 - 99 mg/dL    Alkaline Phosphatase 65 40 - 150 U/L    AST 31 0 - 45 U/L    ALT 37 0 - 70 U/L    Protein Total 7.8 6.8 - 8.8 g/dL    Albumin 3.3 (L) 3.4 - 5.0 g/dL    Bilirubin Total 0.8 0.2 - 1.3 mg/dL    GFR Estimate 78 >60 mL/min/1.73m2   Lipase   Result Value Ref Range    Lipase 887 (H) 73 - 393 U/L   Alcohol level blood   Result Value Ref Range    Alcohol ethyl <0.01 <=0.01 g/dL   CBC with platelets and differential   Result Value Ref Range    WBC Count 14.0 (H) 4.0 - 11.0 10e3/uL    RBC Count 5.10 4.40 - 5.90 10e6/uL    Hemoglobin 16.8 13.3 - 17.7 g/dL    Hematocrit 50.7 40.0 - 53.0 %    MCV 99 78 - 100 fL    MCH 32.9 26.5 - 33.0 pg    MCHC 33.1 31.5 - 36.5 g/dL    RDW 15.2 (H) 10.0 - 15.0 %    Platelet Count 227 150 - 450 10e3/uL   Manual Differential   Result Value Ref Range    % Neutrophils 71 %    % Lymphocytes 22 %    % Monocytes 7 %    % Eosinophils 0 %    % Basophils 0 %    Absolute Neutrophils 9.9 (H) 1.6 - 8.3 10e3/uL    Absolute Lymphocytes 3.1 0.8 - 5.3 10e3/uL    Absolute Monocytes 1.0 0.0 - 1.3 10e3/uL    Absolute Eosinophils 0.0 0.0 - 0.7 10e3/uL    Absolute Basophils 0.0 0.0 - 0.2 10e3/uL    RBC Morphology Confirmed RBC Indices     Platelet Assessment  Automated Count  Confirmed. Platelet morphology is normal.     Automated Count Confirmed. Platelet morphology is normal.    [place] Bodies Present (A) None Seen   UA with Microscopic reflex to Culture    Specimen: Urine, Midstream   Result Value Ref Range    Color Urine Yellow Colorless, Straw, Light Yellow, Yellow    Appearance Urine Clear Clear    Glucose Urine 50  (A) Negative mg/dL    Bilirubin Urine Negative Negative    Ketones Urine Negative Negative mg/dL    Specific Gravity Urine 1.009 1.003 - 1.035    Blood Urine Small (A) Negative    pH Urine 6.0 5.0 - 7.0    Protein Albumin Urine 100  (A) Negative mg/dL    Urobilinogen Urine Normal Normal, 2.0 mg/dL    Nitrite Urine Negative Negative    Leukocyte Esterase Urine Negative Negative    RBC Urine 1 <=2 /HPF    WBC Urine <1 <=5 /HPF    Hyaline Casts Urine 1 <=2 /LPF    Narrative    Urine Culture not indicated   Lactic acid whole blood   Result Value Ref Range    Lactic Acid 1.9 0.7 - 2.0 mmol/L     *Note: Due to a large number of results and/or encounters for the requested time period, some results have not been displayed. A complete set of results can be found in Results Review.       Medications   lactated ringers BOLUS 1,000 mL (0 mLs Intravenous Stopped 8/30/21 1511)     Previous Records Reviewed:  Last lipase on 2/22/2021 was 614    After review of the results of his evaluation he declined admission to the hospital.  His abdomen was reexamined and he had no abdominal pain and a benign abdomen.    Assessments & Plan (with Medical Decision Making)   75-year-old male with history of alcohol dependence and abuse, recurrent pancreatitis, intraductal papillary mucinous neoplasm of the pancreas and status post distal pancreatectomy and splenectomy 3/10/2016  who presents with recurrent upper abdominal pain consistent with recurrent pancreatitis, which began yesterday but had no pain at time of exam and developed no recurrent pain while in the ED and would like to be  discharged home.  He continues to drink alcohol.  Lipase was only 887 WBC elevated, probably secondary to demargination and stress response related to pain.Bouchra has no sexual signs or symptoms or fever.  He understands that his white blood cell count is elevated today and this is concerning in light of his prior history of splenectomy, although this may be a stress upon some demargination related to his recent abdominal pain which is now resolved.  He is afebrile in the ED with a normal lactate. Blood cultures were obtained.  He declined admission and wants to be discharged home.  I will prescribe a small # of Percocet and Zofran use if needed and recommend that he return for reevaluation repeat laboratory evaluation and lipase should his abdominal pain return, or immediately if he develops fever or any infectious signs or symptoms.  I asked that he recheck in clinic in the next 48 hours.  He was counseled to stop drinking alcohol.  He was provided instructions for supportive care and will return as needed for worsened condition or worsening symptoms, or new problems or concerns.      I have reviewed the nursing notes.    I have reviewed the findings, diagnosis, plan and need for follow up with the patient and his wife.      Discharge Medication List as of 8/30/2021  3:12 PM      START taking these medications    Details   ondansetron (ZOFRAN ODT) 4 MG ODT tab Take 1 tablet (4 mg) by mouth every 8 hours as needed for nausea, Disp-15 tablet, R-0, E-Prescribe      oxyCODONE-acetaminophen (PERCOCET) 5-325 MG tablet Take 1-2 tablets by mouth every 6 hours as needed for moderate to severe pain, Disp-12 tablet, R-0, E-Prescribe             Final diagnoses:   Acute recurrent pancreatitis   Alcohol dependence with unspecified alcohol-induced disorder (H) - With recurrent pancreatitis       8/30/2021   [facility] EMERGENCY DEPT     Nelly, Don LOPEZ MD  08/30/21 3346

## 2021-08-30 NOTE — PROGRESS NOTES
ANTICOAGULATION  MANAGEMENT: Discharge Review    Antoine JESSICA Russo chart reviewed for anticoagulation continuity of care    Emergency room visit on 8/30/21 for Abdominal Pain.    Discharge disposition: Home    Results:    Recent labs: (last 7 days)     08/25/21  0723   INR 2.8*     Anticoagulation inpatient management:     not applicable     Anticoagulation discharge instructions:     Warfarin dosing: home regimen continued   Bridging: No   INR goal change: No      Medication changes affecting anticoagulation: Yes: Zofran, Oxycodone    Additional factors affecting anticoagulation: Yes: Recurrent Pancreatitis    Plan     No adjustment to anticoagulation plan needed    Patient not contacted    No adjustment to Anticoagulation Calendar was required    Angela Harrell RN

## 2021-09-04 LAB
BACTERIA BLD CULT: NO GROWTH
BACTERIA BLD CULT: NO GROWTH

## 2021-09-22 ENCOUNTER — OFFICE VISIT (OUTPATIENT)
Dept: URGENT CARE | Facility: URGENT CARE | Age: 76
End: 2021-09-22
Payer: COMMERCIAL

## 2021-09-22 VITALS
BODY MASS INDEX: 30.8 KG/M2 | HEART RATE: 80 BPM | TEMPERATURE: 97.9 F | DIASTOLIC BLOOD PRESSURE: 74 MMHG | SYSTOLIC BLOOD PRESSURE: 110 MMHG | RESPIRATION RATE: 18 BRPM | WEIGHT: 208.6 LBS

## 2021-09-22 DIAGNOSIS — R39.89 URINARY PROBLEM: ICD-10-CM

## 2021-09-22 DIAGNOSIS — N30.01 ACUTE CYSTITIS WITH HEMATURIA: Primary | ICD-10-CM

## 2021-09-22 LAB
ALBUMIN UR-MCNC: 100 MG/DL
APPEARANCE UR: ABNORMAL
BACTERIA #/AREA URNS HPF: ABNORMAL /HPF
BILIRUB UR QL STRIP: NEGATIVE
COLOR UR AUTO: YELLOW
GLUCOSE UR STRIP-MCNC: 250 MG/DL
HGB UR QL STRIP: ABNORMAL
KETONES UR STRIP-MCNC: ABNORMAL MG/DL
LEUKOCYTE ESTERASE UR QL STRIP: ABNORMAL
NITRATE UR QL: POSITIVE
PH UR STRIP: 5.5 [PH] (ref 5–7)
RBC #/AREA URNS AUTO: ABNORMAL /HPF
SP GR UR STRIP: 1.02 (ref 1–1.03)
SQUAMOUS #/AREA URNS AUTO: ABNORMAL /LPF
UROBILINOGEN UR STRIP-ACNC: 1 E.U./DL
WBC #/AREA URNS AUTO: ABNORMAL /HPF

## 2021-09-22 PROCEDURE — 87186 SC STD MICRODIL/AGAR DIL: CPT | Performed by: NURSE PRACTITIONER

## 2021-09-22 PROCEDURE — 87086 URINE CULTURE/COLONY COUNT: CPT | Performed by: NURSE PRACTITIONER

## 2021-09-22 PROCEDURE — 81001 URINALYSIS AUTO W/SCOPE: CPT | Performed by: NURSE PRACTITIONER

## 2021-09-22 PROCEDURE — 99213 OFFICE O/P EST LOW 20 MIN: CPT | Performed by: NURSE PRACTITIONER

## 2021-09-22 RX ORDER — CEPHALEXIN 500 MG/1
500 CAPSULE ORAL 2 TIMES DAILY
Qty: 14 CAPSULE | Refills: 0 | Status: SHIPPED | OUTPATIENT
Start: 2021-09-22 | End: 2021-09-29

## 2021-09-22 ASSESSMENT — PAIN SCALES - GENERAL: PAINLEVEL: NO PAIN (0)

## 2021-09-22 NOTE — PROGRESS NOTES
Chief Complaint   Patient presents with     Urinary Problem     feeling of pressure and retention x 3 days.  On Flomax

## 2021-09-22 NOTE — PROGRESS NOTES
Assessment & Plan   Problem List Items Addressed This Visit     None      Visit Diagnoses     Acute cystitis with hematuria    -  Primary    Relevant Medications    cephALEXin (KEFLEX) 500 MG capsule    Urinary problem        Relevant Orders    UA Macro with Reflex to Micro and Culture - lab collect (Completed)    Urine Microscopic Exam (Completed)    Urine Culture             20 minutes spent on the date of the encounter doing chart review, history and exam, documentation and further activities per the note       Patient Instructions   Antibiotics as directed   Urine culture is pending, will contact you if there is a change in treatment therapy.   Drink plenty of fluids. Prevention and treatment of UTI's discussed.   Signs and symptoms of pyelonephritis mentioned.   RTC if any worsening symptoms or if not improving as expected.     Follow-up with your primary care provider next week and as needed.    Indications for emergent return to emergency department discussed with patient, who verbalized good understanding and agreement.  Patient understands the limitations of today's evaluation.           Patient Education     Bladder Infection, Male (Adult)    You have a bladder infection.  Urine is normally free of bacteria. But bacteria can get into the urinary tract from the skin around the rectum. Or it may travel in the blood from other parts of the body.   This is called a urinary tract infection (UTI). An infection can occur anywhere in the urinary tract. It could be in a kidney (pyelonephritis) or in the bladder (cystitis) and urethra (urethritis). The urethra is the tube that drains urine from the bladder through the tip of the penis.   The most common place for a UTI is in the bladder. This is called a bladder infection. Most bladder infections are easily treated. They are not serious unless the infection spreads up to the kidney.   The terms bladder infection, UTI, and cystitis are often used to describe the  same thing. But they aren t always the same. Cystitis is an inflammation of the bladder. The most common cause of cystitis is an infection.    Keep in mind:    Infections in the urine are called UTIs.    Cystitis is often caused by a UTI.    Not all UTIs and cases of cystitis are bladder infections.    Bladder infections are the most common type of cystitis.  Symptoms of a bladder infection  The infection causes inflammation in the urethra and bladder. This inflammation causes many of the symptoms. The most common symptoms of a bladder infection are:     Pain or burning when urinating    Having to go more often than normal    Feeling like you need to go right away    Only a small amount of urine comes out    Blood in urine    Discomfort in your belly (abdomen), often in the lower belly, above the pubic bone    Cloudy, strong, or bad-smelling urine    Unable to urinate (retention)    Urinary incontinence    Fever    Loss of appetite  Older adults may also feel confused.   Causes of a bladder infection  Bladder infections are not contagious. You can't get one from someone else, from a toilet seat, or from sharing a bath.   The most common cause of bladder infections is bacteria from the bowels. The bacteria get onto the skin around the opening of the urethra. From there they can get into the urine and travel up to the bladder. This causes inflammation and an infection. This often happens because of:     An enlarged prostate    Poor cleaning of the genitals    Procedures that put a tube in your bladder, such as a Chavez catheter    Bowel incontinence    Older age    Not emptying your bladder (the urine stays there, giving the bacteria a chance to grow)    Dehydration (this lets urine to stay in the bladder longer)    Constipation (this can cause the bowels to push on the bladder or urethra and keep the bladder from emptying)  Treatment  Bladder infections are treated with antibiotics. They often clear up quickly without  complications. Treatment helps prevent a more serious kidney infection.   Medicines  Medicines can help in treating a bladder infection:     You may have been given phenazopyridine to ease burning when you urinate. It will cause your urine to be bright orange. It can stain clothing.    You may have been prescribed antibiotics. Take this medicine until you have finished it, even if you feel better. Taking all of the medicine will make sure the infection has cleared.  You can use acetaminophen or ibuprofen for pain, fever, or discomfort, unless another medicine was prescribed. You can also alternate them, or use both together. They work differently and are a different class of medicines, so taking them together is not an overdose. If you have chronic liver or kidney disease, talk with your healthcare provider before using these medicines. Also talk with your provider if you ve had a stomach ulcer or GI (gastrointestinal) bleeding or are taking blood thinner medicines.   Home care  Here are some guidelines to help you care for yourself at home:     Drink plenty of fluids, unless your healthcare provider told you not to. Fluids will prevent dehydration and flush out your bladder.    Use good personal hygiene. Wipe from front to back after using the toilet, and clean your penis regularly. If you aren t circumcised, retract the foreskin when cleaning.    Urinate more often, and don t try to hold it in for long periods of time, if possible.    Wear loose-fitting clothes and cotton underwear. Don't wear tight-fitting pants. This helps keep you clean and dry.    Change your diet to prevent constipation. This means eating more fresh foods and more fiber, and less junk and fatty foods.    Don't have sex until your symptoms are gone.    Don't have caffeine, alcohol, and spicy foods. These can irritate the bladder.  Follow-up care  Follow up with your healthcare provider, or as advised, if all symptoms have not cleared up in 5  days. It's important to keep your follow-up appointment. You can talk with your provider to see if you need more tests of the urinary tract. This is especially important if you have infections that keep coming back.   If a culture was done, you will be told if your treatment needs to be changed. If directed, you can call to find out the results.   If X-rays were taken, you will be told of any findings that may affect your care.   Call 911  Call 911 if any of these occur:     Trouble breathing    Trouble waking up    Feeling confused    Fainting or loss of consciousness    Fast heart rate  When to get medical advice  Call your healthcare provider right away if any of these occur:     Fever of 100.4 F (38 C) or higher, or as directed by your provider    Your symptoms don t improve after 2 days of treatment    Back or belly pain that gets worse    Repeated vomiting, or you aren t able to keep medicine down    Weakness or dizziness  Jinny last reviewed this educational content on 10/1/2019    8115-0272 [employer]. All rights reserved. This information is not intended as a substitute for professional medical care. Always follow your healthcare professional's instructions.           Patient Education     Urinary Tract Infections in Men    Urinary tract infections (UTIs) are most often caused by bacteria that invade the urinary tract. The bacteria may come from outside the body. Or they may travel from the skin outside of the rectum into the urethra. Pain in or around the urinary tract is a common symptom for most UTIs. But the only way to know for sure if you have a UTI is to have a urinalysis and urine culture.   Types of UTIs    Cystitis. This is a bladder infection. It is often linked to a blockage from an enlarged prostate. You may have an urgent or frequent need to urinate, and bloody urine. Treatment includes antibiotics and medicine to relax or shrink the prostate. Sometimes you will need  surgery.    Urethritis. This is an infection of the urethra. You may have a discharge from the urethra or burning when you urinate. You may also have pain in the urethra or penis. It is treated with antibiotics.    Prostatitis. This is an inflammation or infection of the prostate. You may have an urgent or frequent need to urinate, fever, or burning when you urinate. Or you may have a tender prostate, or a vague feeling of pressure. Prostatitis is treated with a range of medicines, depending on the cause.    Pyelonephritis. This is a kidney infection. If not treated, it can be serious and damage your kidneys. In severe cases you may need to stay in the hospital. You may have a fever and lower back pain.  Medicines to treat a UTI  Most UTIs are treated with antibiotics. These kill the bacteria. The length of time you need to take them depends on the type of infection. Take antibiotics exactly as directed until all of the medicine is gone. If you don't, the infection may not go away and may become harder to treat. For certain types of UTIs, you may be given other medicine to help treat your symptoms.  Lifestyle changes to treat and prevent UTIs  The lifestyle changes below will help get rid of your current infection. They may also help prevent future UTIs.    Drink plenty of fluids such as water, juice, or other caffeine-free drinks. This helps flush bacteria out of your system.    Empty your bladder when you feel the urge to urinate and before going to sleep. Urine that stays in your bladder promotes infection.    Use condoms during sex. These help prevent UTIs caused by sexually transmitted bacteria.    Keep follow-up appointments with your healthcare provider. He or she can may do tests to make sure the infection has cleared. If needed, more treatment can be started.  Other treatments to prevent UTIs  Most UTIs respond to medicine. But sometimes you will need a procedure or surgery. This can treat an enlarged  prostate, or remove a kidney stone or other blockage. Surgery may also treat problems caused by scarring or long-term infections.  Date Last Reviewed: 1/1/2017 2000-2017 [employer]. [address] [place] [address], [place] [address]. All rights reserved. This information is not intended as a substitute for professional medical care. Always follow your healthcare professional's instructions. This information has been modified by your health care provider with permission from the publisher.               Return in about 1 week (around 9/29/2021) for Follow up with your primary care provider.    Ivette Kirk, FERNANDO CNP  [facility] [place]    Terri Ashton or Saleem is a 75 year old who presents for the following health issues     HPI     Chief Complaint   Patient presents with     Urinary Problem     feeling of pressure and retention x 3 days.  On Flomax           Review of Systems   Constitutional, HEENT, cardiovascular, pulmonary, GI, , musculoskeletal, neuro, skin, endocrine and psych systems are negative, except as otherwise noted.      Objective    /74 (BP Location: Right arm, Patient Position: Chair, Cuff Size: Adult Large)   Pulse 80   Temp 97.9  F (36.6  C) (Tympanic)   Resp 18   Wt 94.6 kg (208 lb 9.6 oz)   BMI 30.80 kg/m    Body mass index is 30.8 kg/m .  Physical Exam   GENERAL: healthy, alert and no distress, nontoxic in appearance  EYES: Eyes grossly normal to inspection, PERRL and conjunctivae and sclerae normal  HENT:normocephalic  NECK: supple with full ROM  RESP: lungs clear to auscultation - no rales, rhonchi or wheezes  CV: regular rate and rhythm, normal S1 S2, no S3 or S4, no murmur, click or rub, no peripheral edema   ABDOMEN: soft, nontender, no hepatosplenomegaly, no masses and bowel sounds normal  MS: no gross musculoskeletal defects noted, no edema  Neg CVA tenderness    Results for orders placed or performed in visit on 09/22/21 (from  the past 24 hour(s))   UA Macro with Reflex to Micro and Culture - lab collect    Specimen: Urine, Midstream   Result Value Ref Range    Color Urine Yellow Colorless, Straw, Light Yellow, Yellow    Appearance Urine Cloudy (A) Clear    Glucose Urine 250  (A) Negative mg/dL    Bilirubin Urine Negative Negative    Ketones Urine Trace (A) Negative mg/dL    Specific Gravity Urine 1.025 1.003 - 1.035    Blood Urine Moderate (A) Negative    pH Urine 5.5 5.0 - 7.0    Protein Albumin Urine 100  (A) Negative mg/dL    Urobilinogen Urine 1.0 0.2, 1.0 E.U./dL    Nitrite Urine Positive (A) Negative    Leukocyte Esterase Urine Small (A) Negative   Urine Microscopic Exam   Result Value Ref Range    Bacteria Urine Few (A) None Seen /HPF    RBC Urine 25-50 (A) 0-2 /HPF /HPF    WBC Urine  (A) 0-5 /HPF /HPF    Squamous Epithelials Urine Few (A) None Seen /LPF     *Note: Due to a large number of results and/or encounters for the requested time period, some results have not been displayed. A complete set of results can be found in Results Review.

## 2021-09-22 NOTE — PATIENT INSTRUCTIONS
Antibiotics as directed   Urine culture is pending, will contact you if there is a change in treatment therapy.   Drink plenty of fluids. Prevention and treatment of UTI's discussed.   Signs and symptoms of pyelonephritis mentioned.   RTC if any worsening symptoms or if not improving as expected.     Follow-up with your primary care provider next week and as needed.    Indications for emergent return to emergency department discussed with patient, who verbalized good understanding and agreement.  Patient understands the limitations of today's evaluation.           Patient Education     Bladder Infection, Male (Adult)    You have a bladder infection.  Urine is normally free of bacteria. But bacteria can get into the urinary tract from the skin around the rectum. Or it may travel in the blood from other parts of the body.   This is called a urinary tract infection (UTI). An infection can occur anywhere in the urinary tract. It could be in a kidney (pyelonephritis) or in the bladder (cystitis) and urethra (urethritis). The urethra is the tube that drains urine from the bladder through the tip of the penis.   The most common place for a UTI is in the bladder. This is called a bladder infection. Most bladder infections are easily treated. They are not serious unless the infection spreads up to the kidney.   The terms bladder infection, UTI, and cystitis are often used to describe the same thing. But they aren t always the same. Cystitis is an inflammation of the bladder. The most common cause of cystitis is an infection.    Keep in mind:    Infections in the urine are called UTIs.    Cystitis is often caused by a UTI.    Not all UTIs and cases of cystitis are bladder infections.    Bladder infections are the most common type of cystitis.  Symptoms of a bladder infection  The infection causes inflammation in the urethra and bladder. This inflammation causes many of the symptoms. The most common symptoms of a bladder  infection are:     Pain or burning when urinating    Having to go more often than normal    Feeling like you need to go right away    Only a small amount of urine comes out    Blood in urine    Discomfort in your belly (abdomen), often in the lower belly, above the pubic bone    Cloudy, strong, or bad-smelling urine    Unable to urinate (retention)    Urinary incontinence    Fever    Loss of appetite  Older adults may also feel confused.   Causes of a bladder infection  Bladder infections are not contagious. You can't get one from someone else, from a toilet seat, or from sharing a bath.   The most common cause of bladder infections is bacteria from the bowels. The bacteria get onto the skin around the opening of the urethra. From there they can get into the urine and travel up to the bladder. This causes inflammation and an infection. This often happens because of:     An enlarged prostate    Poor cleaning of the genitals    Procedures that put a tube in your bladder, such as a Chavez catheter    Bowel incontinence    Older age    Not emptying your bladder (the urine stays there, giving the bacteria a chance to grow)    Dehydration (this lets urine to stay in the bladder longer)    Constipation (this can cause the bowels to push on the bladder or urethra and keep the bladder from emptying)  Treatment  Bladder infections are treated with antibiotics. They often clear up quickly without complications. Treatment helps prevent a more serious kidney infection.   Medicines  Medicines can help in treating a bladder infection:     You may have been given phenazopyridine to ease burning when you urinate. It will cause your urine to be bright orange. It can stain clothing.    You may have been prescribed antibiotics. Take this medicine until you have finished it, even if you feel better. Taking all of the medicine will make sure the infection has cleared.  You can use acetaminophen or ibuprofen for pain, fever, or discomfort,  unless another medicine was prescribed. You can also alternate them, or use both together. They work differently and are a different class of medicines, so taking them together is not an overdose. If you have chronic liver or kidney disease, talk with your healthcare provider before using these medicines. Also talk with your provider if you ve had a stomach ulcer or GI (gastrointestinal) bleeding or are taking blood thinner medicines.   Home care  Here are some guidelines to help you care for yourself at home:     Drink plenty of fluids, unless your healthcare provider told you not to. Fluids will prevent dehydration and flush out your bladder.    Use good personal hygiene. Wipe from front to back after using the toilet, and clean your penis regularly. If you aren t circumcised, retract the foreskin when cleaning.    Urinate more often, and don t try to hold it in for long periods of time, if possible.    Wear loose-fitting clothes and cotton underwear. Don't wear tight-fitting pants. This helps keep you clean and dry.    Change your diet to prevent constipation. This means eating more fresh foods and more fiber, and less junk and fatty foods.    Don't have sex until your symptoms are gone.    Don't have caffeine, alcohol, and spicy foods. These can irritate the bladder.  Follow-up care  Follow up with your healthcare provider, or as advised, if all symptoms have not cleared up in 5 days. It's important to keep your follow-up appointment. You can talk with your provider to see if you need more tests of the urinary tract. This is especially important if you have infections that keep coming back.   If a culture was done, you will be told if your treatment needs to be changed. If directed, you can call to find out the results.   If X-rays were taken, you will be told of any findings that may affect your care.   Call 911  Call 911 if any of these occur:     Trouble breathing    Trouble waking up    Feeling  confused    Fainting or loss of consciousness    Fast heart rate  When to get medical advice  Call your healthcare provider right away if any of these occur:     Fever of 100.4 F (38 C) or higher, or as directed by your provider    Your symptoms don t improve after 2 days of treatment    Back or belly pain that gets worse    Repeated vomiting, or you aren t able to keep medicine down    Weakness or dizziness  Jinny last reviewed this educational content on 10/1/2019    9661-8810 [employer]. All rights reserved. This information is not intended as a substitute for professional medical care. Always follow your healthcare professional's instructions.           Patient Education     Urinary Tract Infections in Men    Urinary tract infections (UTIs) are most often caused by bacteria that invade the urinary tract. The bacteria may come from outside the body. Or they may travel from the skin outside of the rectum into the urethra. Pain in or around the urinary tract is a common symptom for most UTIs. But the only way to know for sure if you have a UTI is to have a urinalysis and urine culture.   Types of UTIs    Cystitis. This is a bladder infection. It is often linked to a blockage from an enlarged prostate. You may have an urgent or frequent need to urinate, and bloody urine. Treatment includes antibiotics and medicine to relax or shrink the prostate. Sometimes you will need surgery.    Urethritis. This is an infection of the urethra. You may have a discharge from the urethra or burning when you urinate. You may also have pain in the urethra or penis. It is treated with antibiotics.    Prostatitis. This is an inflammation or infection of the prostate. You may have an urgent or frequent need to urinate, fever, or burning when you urinate. Or you may have a tender prostate, or a vague feeling of pressure. Prostatitis is treated with a range of medicines, depending on the cause.    Pyelonephritis. This is  a kidney infection. If not treated, it can be serious and damage your kidneys. In severe cases you may need to stay in the hospital. You may have a fever and lower back pain.  Medicines to treat a UTI  Most UTIs are treated with antibiotics. These kill the bacteria. The length of time you need to take them depends on the type of infection. Take antibiotics exactly as directed until all of the medicine is gone. If you don't, the infection may not go away and may become harder to treat. For certain types of UTIs, you may be given other medicine to help treat your symptoms.  Lifestyle changes to treat and prevent UTIs  The lifestyle changes below will help get rid of your current infection. They may also help prevent future UTIs.    Drink plenty of fluids such as water, juice, or other caffeine-free drinks. This helps flush bacteria out of your system.    Empty your bladder when you feel the urge to urinate and before going to sleep. Urine that stays in your bladder promotes infection.    Use condoms during sex. These help prevent UTIs caused by sexually transmitted bacteria.    Keep follow-up appointments with your healthcare provider. He or she can may do tests to make sure the infection has cleared. If needed, more treatment can be started.  Other treatments to prevent UTIs  Most UTIs respond to medicine. But sometimes you will need a procedure or surgery. This can treat an enlarged prostate, or remove a kidney stone or other blockage. Surgery may also treat problems caused by scarring or long-term infections.  Date Last Reviewed: 1/1/2017 2000-2017 [employer]. [address] [place] [address], [place] [id number]. All rights reserved. This information is not intended as a substitute for professional medical care. Always follow your healthcare professional's instructions. This information has been modified by your health care provider with permission from the publisher.

## 2021-09-23 NOTE — RESULT ENCOUNTER NOTE
[facility] Emergency Dept discharge antibiotic (if prescribed): Cephalexin (Keflex) 500 mg capsule, 1 capsule (500 mg) by mouth 2 times daily for 7 days.  Date of Rx (if applicable):  9/22/21  No changes in treatment per [facility] ED Lab Result Urine culture protocol.

## 2021-09-24 LAB — BACTERIA UR CULT: ABNORMAL

## 2021-10-05 ENCOUNTER — LAB (OUTPATIENT)
Dept: LAB | Facility: CLINIC | Age: 76
End: 2021-10-05
Payer: COMMERCIAL

## 2021-10-05 ENCOUNTER — ANTICOAGULATION THERAPY VISIT (OUTPATIENT)
Dept: FAMILY MEDICINE | Facility: CLINIC | Age: 76
End: 2021-10-05

## 2021-10-05 DIAGNOSIS — I48.20 CHRONIC ATRIAL FIBRILLATION (H): Chronic | ICD-10-CM

## 2021-10-05 DIAGNOSIS — Z79.01 LONG TERM CURRENT USE OF ANTICOAGULANT THERAPY: Chronic | ICD-10-CM

## 2021-10-05 DIAGNOSIS — I48.20 CHRONIC ATRIAL FIBRILLATION (H): Primary | ICD-10-CM

## 2021-10-05 DIAGNOSIS — Z79.01 LONG TERM CURRENT USE OF ANTICOAGULANT THERAPY: ICD-10-CM

## 2021-10-05 LAB — INR BLD: 2.3 (ref 0.9–1.1)

## 2021-10-05 PROCEDURE — 85610 PROTHROMBIN TIME: CPT

## 2021-10-05 PROCEDURE — 36416 COLLJ CAPILLARY BLOOD SPEC: CPT

## 2021-10-05 NOTE — PROGRESS NOTES
ANTICOAGULATION MANAGEMENT     Antoine Rusos 75 year old male is on warfarin with therapeutic INR result. (Goal INR 2.0-3.0)    Recent labs: (last 7 days)     10/05/21  0656   INR 2.3*       ASSESSMENT     Source(s): Chart Review and Patient/Caregiver Call       Warfarin doses taken: Warfarin taken as instructed    Diet: No new diet changes identified    New illness, injury, or hospitalization: No    Medication/supplement changes: yes vitamin D3    Signs or symptoms of bleeding or clotting: No    Previous INR: Therapeutic last 2(+) visits    Additional findings: None     PLAN     Recommended plan for no diet, medication or health factor changes affecting INR     Dosing Instructions: Continue your current warfarin dose with next INR in 6 weeks       Summary  As of 10/5/2021    Full warfarin instructions:  2.5 mg every Tue, Thu, Sat; 1.25 mg all other days   Next INR check:  11/16/2021             Telephone call with Antoine who verbalizes understanding and agrees to plan    Lab visit scheduled    Education provided: Please call back if any changes to your diet, medications or how you've been taking warfarin    Plan made per [facility] anticoagulation protocol    Madhavi Sanders RN  Anticoagulation Clinic  10/5/2021    _______________________________________________________________________     Anticoagulation Episode Summary     Current INR goal:  2.0-3.0   TTR:  95.0 % (11.5 mo)   Target end date:  Indefinite   Send INR reminders to:  [facility]    Indications    Chronic atrial fibrillation (H) [I48.20]  Long term current use of anticoagulant therapy [Z79.01]           Comments:  *          Anticoagulation Care Providers     Provider Role Specialty Phone number    Katie Martino MD Referring Family Medicine [phone]

## 2021-10-11 NOTE — PLAN OF CARE
AVSS, soft BP 90/60's MAP 77; denies pain/N/SOB. Started on full liquid diet, NPO at midnight. LR running at 200 mL/hr in PIV. No other complaints, continue to monitor.     Problem: Patient Care Overview  Goal: Plan of Care/Patient Progress Review  Outcome: No Change   04/16/18 1817   OTHER   Plan Of Care Reviewed With patient   Plan of Care Review   Progress no change       Problem: Pain, Acute (Adult)  Goal: Identify Related Risk Factors and Signs and Symptoms  Related risk factors and signs and symptoms are identified upon initiation of Human Response Clinical Practice Guideline (CPG).   Outcome: No Change   04/16/18 1817   Pain, Acute   Related Risk Factors (Acute Pain) infection;persistent pain   Signs and Symptoms (Acute Pain) fatigue/weakness;verbalization of pain descriptors          Received referral from Brianne Jean-Baptiste M.D. at [facility], Neurology.    Assessment:  Generalized tonic-clonic seizure.  Chronic migraine without aura, intractable, without statue migrainosus.      Call momDanisha, to schedule appointment [phone]

## 2021-10-14 ENCOUNTER — OFFICE VISIT (OUTPATIENT)
Dept: FAMILY MEDICINE | Facility: CLINIC | Age: 76
End: 2021-10-14
Payer: COMMERCIAL

## 2021-10-14 VITALS
SYSTOLIC BLOOD PRESSURE: 136 MMHG | HEART RATE: 71 BPM | DIASTOLIC BLOOD PRESSURE: 74 MMHG | WEIGHT: 211 LBS | OXYGEN SATURATION: 95 % | BODY MASS INDEX: 31.16 KG/M2 | TEMPERATURE: 96.9 F | RESPIRATION RATE: 24 BRPM

## 2021-10-14 DIAGNOSIS — Z79.4 TYPE 2 DIABETES MELLITUS WITH DIABETIC NEUROPATHY, WITH LONG-TERM CURRENT USE OF INSULIN (H): Primary | ICD-10-CM

## 2021-10-14 DIAGNOSIS — I10 HYPERTENSION, BENIGN ESSENTIAL, GOAL BELOW 140/90: Chronic | ICD-10-CM

## 2021-10-14 DIAGNOSIS — E78.5 HYPERLIPIDEMIA LDL GOAL <100: ICD-10-CM

## 2021-10-14 DIAGNOSIS — R80.1 PERSISTENT PROTEINURIA: ICD-10-CM

## 2021-10-14 DIAGNOSIS — E11.40 TYPE 2 DIABETES MELLITUS WITH DIABETIC NEUROPATHY, WITH LONG-TERM CURRENT USE OF INSULIN (H): Primary | ICD-10-CM

## 2021-10-14 DIAGNOSIS — Z79.01 LONG TERM CURRENT USE OF ANTICOAGULANT THERAPY: ICD-10-CM

## 2021-10-14 DIAGNOSIS — I48.20 CHRONIC ATRIAL FIBRILLATION (H): ICD-10-CM

## 2021-10-14 LAB
ALBUMIN SERPL-MCNC: 2.9 G/DL (ref 3.4–5)
ALP SERPL-CCNC: 63 U/L (ref 40–150)
ALT SERPL W P-5'-P-CCNC: 24 U/L (ref 0–70)
ANION GAP SERPL CALCULATED.3IONS-SCNC: 1 MMOL/L (ref 3–14)
AST SERPL W P-5'-P-CCNC: 20 U/L (ref 0–45)
BASOPHILS # BLD AUTO: 0 10E3/UL (ref 0–0.2)
BASOPHILS NFR BLD AUTO: 0 %
BILIRUB SERPL-MCNC: 0.8 MG/DL (ref 0.2–1.3)
BUN SERPL-MCNC: 11 MG/DL (ref 7–30)
CALCIUM SERPL-MCNC: 9.4 MG/DL (ref 8.5–10.1)
CHLORIDE BLD-SCNC: 104 MMOL/L (ref 94–109)
CO2 SERPL-SCNC: 31 MMOL/L (ref 20–32)
CREAT SERPL-MCNC: 0.95 MG/DL (ref 0.66–1.25)
CREAT UR-MCNC: 101 MG/DL
EOSINOPHIL # BLD AUTO: 0.2 10E3/UL (ref 0–0.7)
EOSINOPHIL NFR BLD AUTO: 2 %
ERYTHROCYTE [DISTWIDTH] IN BLOOD BY AUTOMATED COUNT: 16.4 % (ref 10–15)
GFR SERPL CREATININE-BSD FRML MDRD: 79 ML/MIN/1.73M2
GLUCOSE BLD-MCNC: 213 MG/DL (ref 70–99)
HBA1C MFR BLD: 8.6 % (ref 0–5.6)
HCT VFR BLD AUTO: 45.1 % (ref 40–53)
HGB BLD-MCNC: 15 G/DL (ref 13.3–17.7)
HOLD SPECIMEN: NORMAL
LYMPHOCYTES # BLD AUTO: 1.9 10E3/UL (ref 0.8–5.3)
LYMPHOCYTES NFR BLD AUTO: 21 %
MCH RBC QN AUTO: 32.8 PG (ref 26.5–33)
MCHC RBC AUTO-ENTMCNC: 33.3 G/DL (ref 31.5–36.5)
MCV RBC AUTO: 99 FL (ref 78–100)
MICROALBUMIN UR-MCNC: 929 MG/L
MICROALBUMIN/CREAT UR: 919.8 MG/G CR (ref 0–17)
MONOCYTES # BLD AUTO: 0.9 10E3/UL (ref 0–1.3)
MONOCYTES NFR BLD AUTO: 10 %
NEUTROPHILS # BLD AUTO: 5.9 10E3/UL (ref 1.6–8.3)
NEUTROPHILS NFR BLD AUTO: 67 %
PLATELET # BLD AUTO: 204 10E3/UL (ref 150–450)
POTASSIUM BLD-SCNC: 5.1 MMOL/L (ref 3.4–5.3)
PROT SERPL-MCNC: 7.1 G/DL (ref 6.8–8.8)
RBC # BLD AUTO: 4.58 10E6/UL (ref 4.4–5.9)
SODIUM SERPL-SCNC: 136 MMOL/L (ref 133–144)
WBC # BLD AUTO: 8.8 10E3/UL (ref 4–11)

## 2021-10-14 PROCEDURE — 85025 COMPLETE CBC W/AUTO DIFF WBC: CPT | Performed by: FAMILY MEDICINE

## 2021-10-14 PROCEDURE — 36415 COLL VENOUS BLD VENIPUNCTURE: CPT | Performed by: FAMILY MEDICINE

## 2021-10-14 PROCEDURE — 82043 UR ALBUMIN QUANTITATIVE: CPT | Performed by: FAMILY MEDICINE

## 2021-10-14 PROCEDURE — 83036 HEMOGLOBIN GLYCOSYLATED A1C: CPT | Performed by: FAMILY MEDICINE

## 2021-10-14 PROCEDURE — 99214 OFFICE O/P EST MOD 30 MIN: CPT | Performed by: FAMILY MEDICINE

## 2021-10-14 PROCEDURE — 80053 COMPREHEN METABOLIC PANEL: CPT | Performed by: FAMILY MEDICINE

## 2021-10-14 RX ORDER — LISINOPRIL 5 MG/1
5 TABLET ORAL DAILY
Qty: 90 TABLET | Refills: 3 | Status: SHIPPED | OUTPATIENT
Start: 2021-10-14 | End: 2021-10-15

## 2021-10-14 RX ORDER — VITAMIN B COMPLEX
1 TABLET ORAL DAILY
COMMUNITY

## 2021-10-14 NOTE — PROGRESS NOTES
Assessment & Plan     Type 2 diabetes mellitus with diabetic neuropathy, with long-term current use of insulin (H)  Not optimal control  - Albumin Random Urine Quantitative with Creat Ratio; Future  - CBC with platelets and differential; Future  - Comprehensive metabolic panel (BMP + Alb, Alk Phos, ALT, AST, Total. Bili, TP); Future  - CBC with platelets and differential  - Comprehensive metabolic panel (BMP + Alb, Alk Phos, ALT, AST, Total. Bili, TP)  - Albumin Random Urine Quantitative with Creat Ratio; Future  - Albumin Random Urine Quantitative with Creat Ratio    Hypertension, benign essential, goal below 140/90  Well controlled  - lisinopril (ZESTRIL) 5 MG tablet; Take 1 tablet (5 mg) by mouth daily    Long term current use of anticoagulant therapy  Stable on warfarin    Chronic atrial fibrillation (H)  Rate controlled on metoprolol  Anticoagulation with warfarin    Hyperlipidemia LDL goal <100  On statin          Return in about 3 months (around 1/14/2022).    Katie Yoder MD  [facility]    Terri Ashton or Saleem is a 75 year old who presents for the following health issues     HPI   Hypertension Follow-up      Do you check your blood pressure regularly outside of the clinic? No     Are you following a low salt diet? No    Are your blood pressures ever more than 140 on the top number (systolic) OR more   than 90 on the bottom number (diastolic), for example 140/90? No N/A    BP Readings from Last 6 Encounters:   10/14/21 136/74   09/22/21 110/74   08/30/21 (!) 160/92   08/19/21 (!) 182/97   08/19/21 (!) 165/84   07/13/21 139/80      On lisinopril, metoprolol    Diabetes Follow-up    How often are you checking your blood sugar? Two times daily  Blood sugar testing frequency justification:  Adjustment of medication(s)  What time of day are you checking your blood sugars (select all that apply)?  AM and HS  Have you had any blood sugars above 200?  No  Have you had any  blood sugars below 70?  No    What symptoms do you notice when your blood sugar is low?  Weak    What concerns do you have today about your diabetes? None     Do you have any of these symptoms? (Select all that apply)  Numbness in feet    Have you had a diabetic eye exam in the last 12 months? No    Lab Results   Component Value Date    A1C 8.5 07/13/2021    A1C 7.1 10/03/2020    A1C 6.3 05/12/2020    A1C 6.5 02/04/2020    A1C 9.0 10/17/2019    A1C 9.7 07/18/2019     on insulin NPH 34 units am, 32 units pm    hyperlipidemia   On statin  Recent Labs   Lab Test 02/22/21  0721 02/04/20  0836 01/18/16  0843 04/14/15  0853 05/15/14  0832 05/15/14  0832   CHOL 107 143   < > 144   < > 165   HDL 29* 45   < > 27*   < > 30*   LDL 54 74   < > 76   < > 75   TRIG 121 121   < > 206*   < > 302*   CHOLHDLRATIO  --   --   --  5.3*  --  5.0    < > = values in this interval not displayed.        BP Readings from Last 2 Encounters:   10/14/21 (!) 148/80   09/22/21 110/74     Hemoglobin A1C (%)   Date Value   07/13/2021 8.5 (H)   10/03/2020 7.1 (H)   05/12/2020 6.3 (H)     LDL Cholesterol Calculated (mg/dL)   Date Value   02/22/2021 54   02/04/2020 74           How many servings of fruits and vegetables do you eat daily?  2-3    On average, how many sweetened beverages do you drink each day (Examples: soda, juice, sweet tea, etc.  Do NOT count diet or artificially sweetened beverages)?   0    How many days per week do you exercise enough to make your heart beat faster? 4    How many minutes a day do you exercise enough to make your heart beat faster? 20 - 29 walks a lot when working    How many days per week do you miss taking your medication? 0    afib  Persistent  On metoprolol and warfarin      Review of Systems   ROS: 5 point ROS negative except as noted above in HPI, including Gen., Resp., CV, GI &  system review.       Objective    /74   Pulse 71   Temp 96.9  F (36.1  C)   Resp 24   Wt 95.7 kg (211 lb)   SpO2 95%    BMI 31.16 kg/m    Body mass index is 31.16 kg/m .  Physical Exam   GENERAL: healthy, alert and no distress  NECK: no adenopathy, no asymmetry, masses, or scars and thyroid normal to palpation  RESP: lungs clear to auscultation - no rales, rhonchi or wheezes  CV: regular rates and rhythm, normal S1 S2, no S3 or S4, no murmur, click or rub and no peripheral edema  ABDOMEN: soft, nontender, no hepatosplenomegaly, no masses and bowel sounds normal  MS: no gross musculoskeletal defects noted, no edema    Lab Results   Component Value Date    A1C 8.6 10/14/2021

## 2021-10-15 ENCOUNTER — MYC MEDICAL ADVICE (OUTPATIENT)
Dept: FAMILY MEDICINE | Facility: CLINIC | Age: 76
End: 2021-10-15

## 2021-10-15 RX ORDER — LISINOPRIL 10 MG/1
10 TABLET ORAL DAILY
Qty: 90 TABLET | Refills: 3 | Status: SHIPPED | OUTPATIENT
Start: 2021-10-15 | End: 2022-01-20

## 2021-10-24 ENCOUNTER — HEALTH MAINTENANCE LETTER (OUTPATIENT)
Age: 76
End: 2021-10-24

## 2021-10-25 DIAGNOSIS — I48.20 CHRONIC ATRIAL FIBRILLATION (H): ICD-10-CM

## 2021-10-25 RX ORDER — WARFARIN SODIUM 2.5 MG/1
TABLET ORAL
Qty: 70 TABLET | Refills: 0 | Status: SHIPPED | OUTPATIENT
Start: 2021-10-25 | End: 2022-01-29

## 2021-10-27 ENCOUNTER — MYC MEDICAL ADVICE (OUTPATIENT)
Dept: FAMILY MEDICINE | Facility: CLINIC | Age: 76
End: 2021-10-27

## 2021-10-30 DIAGNOSIS — Z79.4 TYPE 2 DIABETES MELLITUS WITH DIABETIC NEUROPATHY, WITH LONG-TERM CURRENT USE OF INSULIN (H): Primary | ICD-10-CM

## 2021-10-30 DIAGNOSIS — E11.40 TYPE 2 DIABETES MELLITUS WITH DIABETIC NEUROPATHY, WITH LONG-TERM CURRENT USE OF INSULIN (H): Primary | ICD-10-CM

## 2021-11-02 RX ORDER — FLASH GLUCOSE SENSOR
KIT MISCELLANEOUS
Qty: 6 EACH | Refills: 1 | Status: SHIPPED | OUTPATIENT
Start: 2021-11-02 | End: 2022-04-08

## 2021-11-16 ENCOUNTER — ANTICOAGULATION THERAPY VISIT (OUTPATIENT)
Dept: ANTICOAGULATION | Facility: CLINIC | Age: 76
End: 2021-11-16

## 2021-11-16 ENCOUNTER — LAB (OUTPATIENT)
Dept: LAB | Facility: CLINIC | Age: 76
End: 2021-11-16
Payer: COMMERCIAL

## 2021-11-16 DIAGNOSIS — I48.20 CHRONIC ATRIAL FIBRILLATION (H): Primary | ICD-10-CM

## 2021-11-16 DIAGNOSIS — Z79.01 LONG TERM CURRENT USE OF ANTICOAGULANT THERAPY: ICD-10-CM

## 2021-11-16 DIAGNOSIS — I48.20 CHRONIC ATRIAL FIBRILLATION (H): Chronic | ICD-10-CM

## 2021-11-16 DIAGNOSIS — Z79.01 LONG TERM CURRENT USE OF ANTICOAGULANT THERAPY: Chronic | ICD-10-CM

## 2021-11-16 LAB — INR BLD: 2.6 (ref 0.9–1.1)

## 2021-11-16 PROCEDURE — 36416 COLLJ CAPILLARY BLOOD SPEC: CPT

## 2021-11-16 PROCEDURE — 85610 PROTHROMBIN TIME: CPT

## 2021-11-16 NOTE — PROGRESS NOTES
VM left for pt to return call to [facility] [phone]. Dosing instructions not given to pt.  Tentative plan: recheck INR in 6 weeks.  Madhavi Sanders RN on 11/16/2021 at 8:15 AM

## 2021-11-16 NOTE — PROGRESS NOTES
ANTICOAGULATION MANAGEMENT     Antoine LOPEZ Rafaela 75 year old male is on warfarin with therapeutic INR result. (Goal INR 2.0-3.0)    Recent labs: (last 7 days)     11/16/21  0655   INR 2.6*       ASSESSMENT     Source(s): Chart Review and Patient/Caregiver Call       Warfarin doses taken: Warfarin taken as instructed    Diet: No new diet changes identified    New illness, injury, or hospitalization: No    Medication/supplement changes: None noted    Signs or symptoms of bleeding or clotting: No    Previous INR: Therapeutic last 2(+) visits    Additional findings: None     PLAN     Recommended plan for no diet, medication or health factor changes affecting INR     Dosing Instructions: Continue your current warfarin dose with next INR in 6 weeks       Summary  As of 11/16/2021    Full warfarin instructions:  2.5 mg every Tue, Thu, Sat; 1.25 mg all other days   Next INR check:  12/28/2021             Telephone call with Antoine who verbalizes understanding and agrees to plan    Lab visit scheduled    Education provided: Please call back if any changes to your diet, medications or how you've been taking warfarin    Plan made per [facility] anticoagulation protocol    Madhavi Sanders RN  Anticoagulation Clinic  11/16/2021    _______________________________________________________________________     Anticoagulation Episode Summary     Current INR goal:  2.0-3.0   TTR:  97.9 % (11.9 mo)   Target end date:  Indefinite   Send INR reminders to:  [facility]    Indications    Chronic atrial fibrillation (H) [I48.20]  Long term current use of anticoagulant therapy [Z79.01]           Comments:           Anticoagulation Care Providers     Provider Role Specialty Phone number    Katie Martino MD Referring Family Medicine [phone]

## 2021-11-24 ENCOUNTER — TRANSFERRED RECORDS (OUTPATIENT)
Dept: HEALTH INFORMATION MANAGEMENT | Facility: CLINIC | Age: 76
End: 2021-11-24
Payer: COMMERCIAL

## 2021-11-24 LAB — RETINOPATHY: NEGATIVE

## 2021-12-02 DIAGNOSIS — E11.40 TYPE 2 DIABETES MELLITUS WITH DIABETIC NEUROPATHY, WITH LONG-TERM CURRENT USE OF INSULIN (H): ICD-10-CM

## 2021-12-02 DIAGNOSIS — Z79.4 TYPE 2 DIABETES MELLITUS WITH DIABETIC NEUROPATHY, WITH LONG-TERM CURRENT USE OF INSULIN (H): ICD-10-CM

## 2021-12-02 RX ORDER — PREGABALIN 150 MG/1
CAPSULE ORAL
Qty: 180 CAPSULE | Refills: 1 | Status: SHIPPED | OUTPATIENT
Start: 2021-12-02 | End: 2022-01-18

## 2021-12-02 NOTE — TELEPHONE ENCOUNTER
Requested Prescriptions   Pending Prescriptions Disp Refills     pregabalin (LYRICA) 150 MG capsule [Pharmacy Med Name: Pregabalin 150 MG Oral Capsule] 180 capsule 0     Sig: Take 1 capsule by mouth twice daily       There is no refill protocol information for this order        Last Written Prescription Date:  7/13/21  Last Fill Quantity: 180,  # refills: 1   Last office visit: 10/14/2021 with prescribing provider:     Future Office Visit:   Next 5 appointments (look out 90 days)    Jan 18, 2022  7:00 AM  SHORT with Katie Martino MD  [facility] ([facility] ) [address] [place] [id number]  [phone]

## 2021-12-16 DIAGNOSIS — I10 HYPERTENSION, BENIGN ESSENTIAL, GOAL BELOW 140/90: ICD-10-CM

## 2021-12-20 RX ORDER — METOPROLOL SUCCINATE 50 MG/1
TABLET, EXTENDED RELEASE ORAL
Qty: 270 TABLET | Refills: 3 | Status: CANCELLED | OUTPATIENT
Start: 2021-12-20

## 2021-12-28 ENCOUNTER — LAB (OUTPATIENT)
Dept: LAB | Facility: CLINIC | Age: 76
End: 2021-12-28
Payer: COMMERCIAL

## 2021-12-28 ENCOUNTER — ANTICOAGULATION THERAPY VISIT (OUTPATIENT)
Dept: ANTICOAGULATION | Facility: CLINIC | Age: 76
End: 2021-12-28

## 2021-12-28 DIAGNOSIS — Z79.01 LONG TERM CURRENT USE OF ANTICOAGULANT THERAPY: Chronic | ICD-10-CM

## 2021-12-28 DIAGNOSIS — I48.20 CHRONIC ATRIAL FIBRILLATION (H): Primary | ICD-10-CM

## 2021-12-28 DIAGNOSIS — Z79.01 LONG TERM CURRENT USE OF ANTICOAGULANT THERAPY: ICD-10-CM

## 2021-12-28 DIAGNOSIS — I48.20 CHRONIC ATRIAL FIBRILLATION (H): Chronic | ICD-10-CM

## 2021-12-28 LAB — INR BLD: 2.1 (ref 0.9–1.1)

## 2021-12-28 PROCEDURE — 85610 PROTHROMBIN TIME: CPT

## 2021-12-28 PROCEDURE — 36416 COLLJ CAPILLARY BLOOD SPEC: CPT

## 2021-12-28 NOTE — PROGRESS NOTES
ANTICOAGULATION MANAGEMENT     Antoine Russo 75 year old male is on warfarin with therapeutic INR result. (Goal INR 2.0-3.0)    Recent labs: (last 7 days)     12/28/21  0656   INR 2.1*       ASSESSMENT     Source(s): Chart Review and Patient/Caregiver Call       Warfarin doses taken: Warfarin taken differently, but did not change total weekly dose - pt forgot to take his warfarin last night. So he took it this morning      Diet: No new diet changes identified    New illness, injury, or hospitalization: No    Medication/supplement changes: None noted    Signs or symptoms of bleeding or clotting: No - pt does get bruises on his left side of his body. It is from bumping up against something.    Previous INR: Therapeutic last 2(+) visits    Additional findings: None     PLAN     Recommended plan for no diet, medication or health factor changes affecting INR     Dosing Instructions: Continue your current warfarin dose with next INR in 6 weeks       Summary  As of 12/28/2021    Full warfarin instructions:  12/28: 3.75 mg; Otherwise 2.5 mg every Tue, Thu, Sat; 1.25 mg all other days   Next INR check:  2/8/2022             Telephone call with Antoine who verbalizes understanding and agrees to plan    Lab visit scheduled    Education provided: Please call back if any changes to your diet, medications or how you've been taking warfarin    Plan made per [facility] anticoagulation protocol    Madhavi Sanders RN  Anticoagulation Clinic  12/28/2021    _______________________________________________________________________     Anticoagulation Episode Summary     Current INR goal:  2.0-3.0   TTR:  100.0 % (11.9 mo)   Target end date:  Indefinite   Send INR reminders to:  [facility]    Indications    Chronic atrial fibrillation (H) [I48.20]  Long term current use of anticoagulant therapy [Z79.01]           Comments:           Anticoagulation Care Providers     Provider Role Specialty Phone number    Katie Martino MD  [facility] [phone]

## 2022-01-18 ENCOUNTER — OFFICE VISIT (OUTPATIENT)
Dept: FAMILY MEDICINE | Facility: CLINIC | Age: 77
End: 2022-01-18
Payer: COMMERCIAL

## 2022-01-18 VITALS
SYSTOLIC BLOOD PRESSURE: 132 MMHG | TEMPERATURE: 97.1 F | OXYGEN SATURATION: 94 % | HEART RATE: 100 BPM | DIASTOLIC BLOOD PRESSURE: 78 MMHG | WEIGHT: 209 LBS | HEIGHT: 69 IN | RESPIRATION RATE: 20 BRPM | BODY MASS INDEX: 30.96 KG/M2

## 2022-01-18 DIAGNOSIS — E11.40 TYPE 2 DIABETES MELLITUS WITH DIABETIC NEUROPATHY, WITH LONG-TERM CURRENT USE OF INSULIN (H): Primary | ICD-10-CM

## 2022-01-18 DIAGNOSIS — R09.89 DECREASED PULSES IN FEET: ICD-10-CM

## 2022-01-18 DIAGNOSIS — R80.1 PERSISTENT PROTEINURIA: ICD-10-CM

## 2022-01-18 DIAGNOSIS — N18.2 CHRONIC KIDNEY DISEASE, STAGE 2 (MILD): ICD-10-CM

## 2022-01-18 DIAGNOSIS — I73.9 PERIPHERAL VASCULAR DISEASE (H): ICD-10-CM

## 2022-01-18 DIAGNOSIS — I48.20 CHRONIC ATRIAL FIBRILLATION (H): ICD-10-CM

## 2022-01-18 DIAGNOSIS — I10 HYPERTENSION, BENIGN ESSENTIAL, GOAL BELOW 140/90: ICD-10-CM

## 2022-01-18 DIAGNOSIS — E78.5 HYPERLIPIDEMIA LDL GOAL <100: ICD-10-CM

## 2022-01-18 DIAGNOSIS — Z79.01 LONG TERM CURRENT USE OF ANTICOAGULANT THERAPY: ICD-10-CM

## 2022-01-18 DIAGNOSIS — Z79.4 TYPE 2 DIABETES MELLITUS WITH DIABETIC NEUROPATHY, WITH LONG-TERM CURRENT USE OF INSULIN (H): Primary | ICD-10-CM

## 2022-01-18 DIAGNOSIS — C04.0 MALIGNANT NEOPLASM OF ANTERIOR PORTION OF FLOOR OF MOUTH (H): ICD-10-CM

## 2022-01-18 DIAGNOSIS — F10.20 ALCOHOL DEPENDENCE, UNCOMPLICATED (H): ICD-10-CM

## 2022-01-18 PROBLEM — K86.1 ACUTE ON CHRONIC PANCREATITIS (H): Status: RESOLVED | Noted: 2021-02-16 | Resolved: 2022-01-18

## 2022-01-18 PROBLEM — K85.90 ACUTE ON CHRONIC PANCREATITIS (H): Status: RESOLVED | Noted: 2021-02-16 | Resolved: 2022-01-18

## 2022-01-18 LAB
HBA1C MFR BLD: 9.3 % (ref 0–5.6)
HOLD SPECIMEN: NORMAL
HOLD SPECIMEN: NORMAL

## 2022-01-18 PROCEDURE — 99207 PR FOOT EXAM NO CHARGE: CPT | Performed by: FAMILY MEDICINE

## 2022-01-18 PROCEDURE — 80048 BASIC METABOLIC PNL TOTAL CA: CPT | Performed by: FAMILY MEDICINE

## 2022-01-18 PROCEDURE — 36415 COLL VENOUS BLD VENIPUNCTURE: CPT | Performed by: FAMILY MEDICINE

## 2022-01-18 PROCEDURE — 83036 HEMOGLOBIN GLYCOSYLATED A1C: CPT | Performed by: FAMILY MEDICINE

## 2022-01-18 PROCEDURE — 99214 OFFICE O/P EST MOD 30 MIN: CPT | Performed by: FAMILY MEDICINE

## 2022-01-18 PROCEDURE — 80061 LIPID PANEL: CPT | Performed by: FAMILY MEDICINE

## 2022-01-18 RX ORDER — ROSUVASTATIN CALCIUM 10 MG/1
10 TABLET, COATED ORAL DAILY
Qty: 90 TABLET | Refills: 3 | Status: SHIPPED | OUTPATIENT
Start: 2022-01-18 | End: 2022-10-18 | Stop reason: ALTCHOICE

## 2022-01-18 RX ORDER — PREGABALIN 150 MG/1
150 CAPSULE ORAL 2 TIMES DAILY
Qty: 180 CAPSULE | Refills: 1 | Status: SHIPPED | OUTPATIENT
Start: 2022-01-18 | End: 2022-07-25

## 2022-01-18 RX ORDER — HUMAN INSULIN 100 [IU]/ML
INJECTION, SUSPENSION SUBCUTANEOUS
Qty: 30 ML | Refills: 5 | Status: SHIPPED | OUTPATIENT
Start: 2022-01-18 | End: 2022-04-19

## 2022-01-18 ASSESSMENT — MIFFLIN-ST. JEOR: SCORE: 1678.4

## 2022-01-18 ASSESSMENT — PAIN SCALES - GENERAL: PAINLEVEL: NO PAIN (0)

## 2022-01-18 NOTE — PROGRESS NOTES
"  Assessment & Plan     Type 2 diabetes mellitus with diabetic neuropathy, with long-term current use of insulin (H)  Not well controlled  Increase insulin, better diet  - Hemoglobin A1c; Future  - Hemoglobin A1c  - pregabalin (LYRICA) 150 MG capsule; Take 1 capsule (150 mg) by mouth 2 times daily  - insulin NPH (NOVOLIN N FLEXPEN RELION) 100 UNIT/ML injection; Take 38 units of N in the am and 32 units in the pm.    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    Weight as of this encounter: 94.8 kg (209 lb).   Weight management plan: Discussed healthy diet and exercise guidelines        Return in about 3 months (around 4/18/2022).    Katie Yoder MD  [facility]    Terri Monge is a 76 year old who presents for the following health issues     HPI     Diabetes Follow-up    How often are you checking your blood sugar? Two times daily  Blood sugar testing frequency justification:  Uncontrolled diabetes  What time of day are you checking your blood sugars (select all that apply)?  Before " and after meals  Have you had any blood sugars above 200?  Yes a little  Have you had any blood sugars below 70?  No    What symptoms do you notice when your blood sugar is low?  None    What concerns do you have today about your diabetes? Other: feet     Do you have any of these symptoms? (Select all that apply)  Numbness in feet and Burning in feet    Lab Results   Component Value Date    A1C 9.3 01/18/2022    A1C 8.6 10/14/2021    A1C 8.5 07/13/2021    A1C 7.1 10/03/2020    A1C 6.3 05/12/2020    A1C 6.5 02/04/2020    A1C 9.0 10/17/2019    A1C 9.7 07/18/2019     NPH  insulin 36 unit(s) am, 32 unit(s) pm   No lows  Has room to improve diet      Hyperlipidemia Follow-Up      Are you regularly taking any medication or supplement to lower your cholesterol?   Yes- Crestor    Are you having muscle aches or other side effects that you think could be caused by your cholesterol lowering medication?  No  Recent Labs   Lab Test 02/22/21  0721 02/04/20  0836 01/18/16  0843 04/14/15  0853 05/15/14  0832   CHOL 107 143   < > 144 165   HDL 29* 45   < > 27* 30*   LDL 54 74   < > 76 75   TRIG 121 121   < > 206* 302*   CHOLHDLRATIO  --   --   --  5.3* 5.0    < > = values in this interval not displayed.      Hypertension Follow-up      Do you check your blood pressure regularly outside of the clinic? No     Are you following a low salt diet? No  On metoprolol, lisinopril  BP Readings from Last 6 Encounters:   01/18/22 132/78   10/14/21 136/74   09/22/21 110/74   08/30/21 (!) 160/92   08/19/21 (!) 182/97   08/19/21 (!) 165/84      afib  On warfarin, metoprolol    Decreased pulses in feet  He does get pain in calves when walks at times    BP Readings from Last 6 Encounters:   01/18/22 132/78   10/14/21 136/74   09/22/21 110/74   08/30/21 (!) 160/92   08/19/21 (!) 182/97   08/19/21 (!) 165/84      BP Readings from Last 2 Encounters:   01/18/22 132/78   10/14/21 136/74     Hemoglobin A1C POCT (%)   Date Value   10/03/2020 7.1 (H)  "  05/12/2020 6.3 (H)     Hemoglobin A1C (%)   Date Value   01/18/2022 9.3 (H)   10/14/2021 8.6 (H)     LDL Cholesterol Calculated (mg/dL)   Date Value   02/22/2021 54   02/04/2020 74         Review of Systems   ROS: 5 point ROS negative except as noted above in HPI, including Gen., Resp., CV, GI &  system review.       Objective    /78 (BP Location: Right arm)   Pulse 100   Temp 97.1  F (36.2  C) (Tympanic)   Resp 20   Ht 1.753 m (5' 9\")   Wt 94.8 kg (209 lb)   SpO2 94%   BMI 30.86 kg/m    Body mass index is 30.86 kg/m .  Physical Exam   GENERAL: healthy, alert and no distress  NECK: no adenopathy, no asymmetry, masses, or scars and thyroid normal to palpation  RESP: lungs clear to auscultation - no rales, rhonchi or wheezes  CV: regular rate and rhythm, normal S1 S2, no S3 or S4, no murmur, click or rub, no peripheral edema and peripheral pulses strong  ABDOMEN: soft, nontender, no hepatosplenomegaly, no masses and bowel sounds normal  MS: no gross musculoskeletal defects noted, no edema  Diabetic foot exam: no hair on legs, no trophic changes or ulcerative lesions, DP absent bilaterally and PT absent bilaterally, no feeling to monofilament exam    Results for orders placed or performed in visit on 01/18/22   Hemoglobin A1c     Status: Abnormal   Result Value Ref Range    Hemoglobin A1C 9.3 (H) 0.0 - 5.6 %   Extra Serum Separator Tube (SST)     Status: None   Result Value Ref Range    Hold Specimen JIC    Extra Green Top (Lithium Heparin) Tube     Status: None   Result Value Ref Range    Hold Specimen JIC    Extra Tube     Status: None    Narrative    The following orders were created for panel order Extra Tube.  Procedure                               Abnormality         Status                     ---------                               -----------         ------                     Extra Serum Separator Tu...[[id number]]                      Final result               Extra Green Top " (Lithium...[[id number]]                      Final result                 Please view results for these tests on the individual orders.

## 2022-01-18 NOTE — NURSING NOTE
"Chief Complaint   Patient presents with     Diabetes       Initial /78 (BP Location: Right arm)   Pulse 100   Temp 97.1  F (36.2  C) (Tympanic)   Resp 20   Ht 1.753 m (5' 9\")   Wt 94.8 kg (209 lb)   SpO2 94%   BMI 30.86 kg/m   Estimated body mass index is 30.86 kg/m  as calculated from the following:    Height as of this encounter: 1.753 m (5' 9\").    Weight as of this encounter: 94.8 kg (209 lb).    Patient presents to the clinic using No DME    Health Maintenance that is potentially due pending provider review:  NONE    n/a    Is there anyone who you would like to be able to receive your results? No  If yes have patient fill out VIDYA    "

## 2022-01-18 NOTE — PATIENT INSTRUCTIONS
Increase insulin to 38 units in am, 32 in pm  Better diet    Get ABIs in [place] to look at blood flow to feet

## 2022-01-19 LAB
ANION GAP SERPL CALCULATED.3IONS-SCNC: 7 MMOL/L (ref 3–14)
BUN SERPL-MCNC: 13 MG/DL (ref 7–30)
CALCIUM SERPL-MCNC: 9 MG/DL (ref 8.5–10.1)
CHLORIDE BLD-SCNC: 102 MMOL/L (ref 94–109)
CHOLEST SERPL-MCNC: 116 MG/DL
CO2 SERPL-SCNC: 27 MMOL/L (ref 20–32)
CREAT SERPL-MCNC: 0.99 MG/DL (ref 0.66–1.25)
GFR SERPL CREATININE-BSD FRML MDRD: 80 ML/MIN/1.73M2
GLUCOSE BLD-MCNC: 257 MG/DL (ref 70–99)
HDLC SERPL-MCNC: 30 MG/DL
LDLC SERPL CALC-MCNC: 48 MG/DL
NONHDLC SERPL-MCNC: 86 MG/DL
POTASSIUM BLD-SCNC: 4.7 MMOL/L (ref 3.4–5.3)
SODIUM SERPL-SCNC: 136 MMOL/L (ref 133–144)
TRIGL SERPL-MCNC: 191 MG/DL

## 2022-01-20 PROBLEM — N18.2 CHRONIC KIDNEY DISEASE, STAGE 2 (MILD): Status: ACTIVE | Noted: 2022-01-20

## 2022-01-20 RX ORDER — LISINOPRIL 10 MG/1
10 TABLET ORAL DAILY
Qty: 90 TABLET | Refills: 3 | Status: SHIPPED | OUTPATIENT
Start: 2022-01-20 | End: 2023-02-20

## 2022-01-27 ENCOUNTER — HOSPITAL ENCOUNTER (OUTPATIENT)
Dept: ULTRASOUND IMAGING | Facility: CLINIC | Age: 77
Discharge: HOME | End: 2022-01-27
Attending: FAMILY MEDICINE | Admitting: FAMILY MEDICINE
Payer: COMMERCIAL

## 2022-01-27 DIAGNOSIS — R09.89 DECREASED PULSES IN FEET: ICD-10-CM

## 2022-01-27 PROCEDURE — 93924 LWR XTR VASC STDY BILAT: CPT

## 2022-01-28 DIAGNOSIS — I48.20 CHRONIC ATRIAL FIBRILLATION (H): ICD-10-CM

## 2022-01-28 NOTE — TELEPHONE ENCOUNTER
Routing refill request to provider for review/approval because:  Labs out of range:  INR: 2.1 on 12/2021    Enrique Patricia RN

## 2022-01-29 RX ORDER — WARFARIN SODIUM 2.5 MG/1
TABLET ORAL
Qty: 70 TABLET | Refills: 3 | Status: SHIPPED | OUTPATIENT
Start: 2022-01-29 | End: 2022-05-04

## 2022-02-01 ENCOUNTER — TELEPHONE (OUTPATIENT)
Dept: OTHER | Facility: CLINIC | Age: 77
End: 2022-02-01
Payer: COMMERCIAL

## 2022-02-01 NOTE — TELEPHONE ENCOUNTER
Pt referred to [facility] by Katie Martino MD for PVD.    Patient has ERIN US in Epic.     Pt needs to be scheduled for consult with vascular surgery ( severe ERIN) .  Will route to scheduling to coordinate an appointment in the next week.    Appointment note: Patient referred by Katie Martino MD for PVD, severe ERIN's.       Janis TAYLORN, RN    [facility]  Office: [phone]  Fax: [phone]

## 2022-02-04 ENCOUNTER — OFFICE VISIT (OUTPATIENT)
Dept: OTHER | Facility: CLINIC | Age: 77
End: 2022-02-04
Attending: FAMILY MEDICINE
Payer: COMMERCIAL

## 2022-02-04 VITALS
HEIGHT: 70 IN | DIASTOLIC BLOOD PRESSURE: 90 MMHG | HEART RATE: 108 BPM | SYSTOLIC BLOOD PRESSURE: 151 MMHG | BODY MASS INDEX: 28.63 KG/M2 | WEIGHT: 200 LBS

## 2022-02-04 DIAGNOSIS — I73.9 PERIPHERAL VASCULAR DISEASE (H): ICD-10-CM

## 2022-02-04 DIAGNOSIS — I70.213 ATHEROSCLEROSIS OF NATIVE ARTERIES OF EXTREMITIES WITH INTERMITTENT CLAUDICATION, BILATERAL LEGS (H): Primary | ICD-10-CM

## 2022-02-04 DIAGNOSIS — I65.8 OCCLUSION AND STENOSIS OF OTHER PRECEREBRAL ARTERIES: ICD-10-CM

## 2022-02-04 PROCEDURE — G0463 HOSPITAL OUTPT CLINIC VISIT: HCPCS

## 2022-02-04 PROCEDURE — 99205 OFFICE O/P NEW HI 60 MIN: CPT | Performed by: SURGERY

## 2022-02-04 ASSESSMENT — MIFFLIN-ST. JEOR: SCORE: 1653.44

## 2022-02-04 NOTE — PROGRESS NOTES
"[facility] Surgery Clinic     Antoine Russo MRN# [id number]   YOB: 1945 Age: 76 year old        Reason for Clinic Visit: PAD              History of Present Illness:   Antoine Russo is a 76 year old male who presents for evaluation of peripheral arterial disease.      He reports never getting post-splenectomy vaccines. He has had 1 flu shot, the pneumovax once, and the shingles vaccine.   He has not been back to see Gastroenterology since 2020, for either repeat EGD or colonoscopy.   He underwent a CT of the chest in 07/2021 for pulmonary nodules, but has not had a CT (or imaging) of the neck since 2017.   No known history of overt MI or stroke. Heart cath in 2009 with LAD and RCA mild stenoses, no further cardiac catheterizations that I am able to find.    Walks minimally day to day, secondary to cramping and aching in his legs. Both legs are symmetric. He finds thick carpeting and his crocs are helpful in limiting discomfort. He uses the motorized scooters to get around large stores. He is able to get [id number] at home. He is able to get up and down a flight of stairs (again, specifying that shoes make him more comfortable). His wife does the grocery shopping. He has neuropathy in his feet from DM and his wife helps with his toenail care. He believes he can walk about 100 yards or less, slowly, before he gets leg cramps.     See note from PCP (7/13/21, Dr. Martino): \"From Dr Ravi's ENT note 8/2/17:   Mr. 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  He did not have postoperative radiotherapy and had a forearm free flap reconstruction by Dr. Yung Alvares.  His major difficulty occurred in 2013, when he developed C. diff colitis and had to have a colectomy.\"          Past Medical " History:   I have personally reviewed the following:   Past Medical History:   Diagnosis Date     Acute kidney injury (H) 04/17/2019     Acute on chronic pancreatitis (H) 01/23/2018     Bacteriuria with pyuria 04/17/2019     C. difficile colitis      Chronic atrial fibrillation (H)     On chronic anticoagulation with coumadin     Colon polyp 08/26/2011    Colonoscopy 8/2011-A sessile polyp was found in the cecum. The polyp was 6 mm in size. The polyp was removed with a hot snare. Resection and retrieval were complete. A sessile polyp was found in the proximal transverse colon. The polyp was 15 mm in size. The polyp was removed with a hot snare. Resection and retrieval were complete. A sessile polyp was found in the sigmoid colon. 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, N0, M0 carcinoma, underwent transcervical glossectomy, floor of mouth excision, BL neck dissection, adj radiation, and skin flap reconstruction     Recurrent pancreatitis     alcoholic pancreatitis             Past Surgical History:   I have personally reviewed the following:   Past Surgical History:   Procedure Laterality Date     BREAST SURGERY  2008    right breast mass benign     COLONOSCOPY      multiple polyps removed     COLONOSCOPY  8/24/2011    Procedure:COMBINED COLONOSCOPY, REMOVE TUMOR/POLYP/LESION BY SNARE; Surgeon:MILEY ARBOLEDA; Location:[facility]     COLONOSCOPY  12/17/2012    Procedure: COLONOSCOPY;;  Surgeon: Leon Maurer MD;  [facility]     COLONOSCOPY  12/18/2012    Procedure: COLONOSCOPY;;  Surgeon: Leon Maurer MD;  [facility]     DENERVATION OF SPERMATIC CORD MICROSURGICAL Left 5/23/2017    Procedure: DENERVATION OF SPERMATIC CORD MICROSURGICAL;;  Surgeon: Marcio Aggarwal MD;  [facility]     DISSECTION RADICAL NECK BILATERAL  8/2/2012    Procedure: DISSECTION RADICAL NECK BILATERAL;;   Surgeon: Yung Alvares MD;  [facility]     ENDOSCOPIC RETROGRADE CHOLANGIOPANCREATOGRAM N/A 5/10/2016    Procedure: COMBINED ENDOSCOPIC RETROGRADE CHOLANGIOPANCREATOGRAPHY, PLACE TUBE/STENT;  Surgeon: Yovanny Beasley MD;  [facility]     ENDOSCOPIC RETROGRADE CHOLANGIOPANCREATOGRAM N/A 3/29/2018    Procedure: ENDOSCOPIC RETROGRADE CHOLANGIOPANCREATOGRAM;  Endoscopic Retrograde Cholangiopancreatogram, Endoscopic Ultrasound, Biliary Sphincterotomy, Biliary and Pancreatic Stent Placement;  Surgeon: Yovanny Beasley MD;  [facility]     ENDOSCOPIC ULTRASOUND UPPER GASTROINTESTINAL TRACT (GI) N/A 2/3/2016    Procedure: ENDOSCOPIC ULTRASOUND, ESOPHAGOSCOPY / UPPER GASTROINTESTINAL TRACT (GI);  Surgeon: Grabiel Plata MD;  [facility]     ENDOSCOPIC ULTRASOUND UPPER GASTROINTESTINAL TRACT (GI) N/A 3/29/2018    Procedure: ENDOSCOPIC ULTRASOUND, ESOPHAGOSCOPY / UPPER GASTROINTESTINAL TRACT (GI);;  Surgeon: Grabiel Plata MD;  [facility]     ESOPHAGOSCOPY, GASTROSCOPY, DUODENOSCOPY (EGD), COMBINED N/A 2/3/2016    Procedure: COMBINED ENDOSCOPIC ULTRASOUND, ESOPHAGOSCOPY, GASTROSCOPY, DUODENOSCOPY (EGD), FINE NEEDLE ASPIRATE/BIOPSY;  Surgeon: Grabiel Plata MD;  [facility]     ESOPHAGOSCOPY, GASTROSCOPY, DUODENOSCOPY (EGD), COMBINED N/A 6/8/2016    Procedure: COMBINED ESOPHAGOSCOPY, GASTROSCOPY, DUODENOSCOPY (EGD), REMOVE FOREIGN BODY;  Surgeon: Yovanny Beasley MD;  [facility]     ESOPHAGOSCOPY, GASTROSCOPY, DUODENOSCOPY (EGD), COMBINED N/A 4/17/2018    Procedure: COMBINED ESOPHAGOSCOPY, GASTROSCOPY, DUODENOSCOPY (EGD), REMOVE FOREIGN BODY;  EGD with stent removal;  Surgeon: Grabiel Plata MD;  [facility]     EXCISE LESION INTRAORAL  6/14/2012    Procedure: EXCISE LESION INTRAORAL;  Wide Local Excision Floor of Mouth, Direct Laryngoscopy, Bilateral Mohave's Marsuplization, Split Thickness Skin Graft from right Thigh  Latex Safe;   Surgeon: Gerson Ravi MD;  [facility]     EXCISE LESION INTRAORAL  8/2/2012    Procedure: EXCISE LESION INTRAORAL;  Floor of Mouth Resection, Bilateral Selective Radical Neck Dissection, Tracheostomy, Left Radial Forearm  Free Flap with Alloderm, Nasogastric Feeding Tube Placement,    * Latex Safe*;  Surgeon: Gerson Ravi MD;  [facility]     EXCISE LESION INTRAORAL  12/11/2012    Procedure: EXCISE LESION INTRAORAL;  takedown of oral flap;  Surgeon: Yung Alvares MD;  [facility]     GRAFT FREE VASCULARIZED (LOCATION)  8/2/2012    Procedure: GRAFT FREE VASCULARIZED (LOCATION);;  Surgeon: Yung Alvares MD;  [facility]     GRAFT SKIN SPLIT THICKNESS FROM EXTREMITY  6/14/2012    Procedure: GRAFT SKIN SPLIT THICKNESS FROM EXTREMITY;;  Surgeon: Gerson Ravi MD;  [facility]     LAPAROSCOPIC ILEOSTOMY TAKEDOWN  6/6/2013    Procedure: LAPAROSCOPIC ILEOSTOMY TAKEDOWN;  Laparoscopic Closure of Enterostomy, Guerda's Type with IleoRectal Anastomosis ;  Surgeon: Grabiel Riddle MD;  [facility]     LAPAROTOMY EXPLORATORY  12/20/2012    Procedure: LAPAROTOMY EXPLORATORY;  Exploratory Laparotomy, total abdominal colectomy, ileostomy formation;  Surgeon: Miquel Cannon MD;  [facility]     LARYNGOSCOPY  6/14/2012    Procedure: LARYNGOSCOPY;;  Surgeon: Gerson Ravi MD;  [facility]     ORTHOPEDIC SURGERY      ganglian cyst left ankle     PANCREATECTOMY, SPLENECTOMY N/A 3/10/2016    Procedure: PANCREATECTOMY, SPLENECTOMY;  Surgeon: Nael Abel MD;  [facility]     SHOULDER SURGERY  2006, 2008    2006- right rotator cuff, 2008 bone spur on left. Dr. 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 Still drinking 2-3 beers a day, believes he gets through less than 1 case a week. Retired . Works part-time for a car dealership. No illicit drug use.   Lives at home with his wife and two dogs.           Family History:     Family History   Problem Relation Age of Onset     Diabetes Sister         onset age 50     Alzheimer Disease Mother          80     Alzheimer Disease Father          85     Diabetes Other         nephew type 1     Diabetes Other      Aneurysm Sister      Anesthesia Reaction No family hx of      Colon Cancer No family hx of      Colon Polyps No family hx of      Crohn's Disease No family hx of      Ulcerative Colitis No family hx of              Allergies:     Allergies   Allergen Reactions     Nkda [No Known Drug Allergies]              Medications:     Current Outpatient Medications Ordered in Epic   Medication     ASPIRIN NOT PRESCRIBED (INTENTIONAL)     Continuous Blood Gluc Sensor (ClicDataYLE LISA 14 DAY SENSOR) MISC     insulin NPH (NOVOLIN N FLEXPEN RELION) 100 UNIT/ML injection     insulin pen needle (BD LUIS U/F) 32G X 4 MM miscellaneous     lisinopril (ZESTRIL) 10 MG tablet     metoprolol succinate ER (TOPROL-XL) 50 MG 24 hr tablet     multivitamin, therapeutic with minerals (THERA-VIT-M) TABS     pregabalin (LYRICA) 150 MG capsule     rosuvastatin (CRESTOR) 10 MG tablet     tamsulosin (FLOMAX) 0.4 MG capsule     vitamin B-12 (CYANOCOBALAMIN) 100 MCG tablet     Vitamin D3 (CHOLECALCIFEROL) 25 mcg (1000 units) tablet     warfarin ANTICOAGULANT (COUMADIN) 2.5 MG tablet     No current Epic-ordered  "facility-administered medications on file.             Review of Systems:   The 10 point Review of Systems is negative other than noted in the HPI          Physical Exam:   Vitals were reviewed      BP: (!) 151/90 Pulse: 108              General: sitting comfortably on exam table, NAD.   Neuro/Psych: A&O x 4, pleasantly conversant   HENT: EOMI and conjugate. Moist mucous membranes. Wears glasses. Hoarse voice.   Cardiac: Regular rate and rhythm, no m/g appreciated.   Pulm: Lungs CTAB, no w/r/r.  Abd: Soft, non-distended, no tenderness to palpation.  Extrem: Grossly normal and symmetric ROM in all four extremities. No edema.  Skin: Clean, dry, no rashes or wounds.  Vasc: biphasic R DP, R PT, L PT, monophasic L DP on doppler           Data:   Labs:       Lab Results   Component Value Date     01/18/2022     02/22/2021    Lab Results   Component Value Date    CHLORIDE 102 01/18/2022    CHLORIDE 103 02/22/2021    Lab Results   Component Value Date    BUN 13 01/18/2022    BUN 13 02/22/2021      Lab Results   Component Value Date    POTASSIUM 4.7 01/18/2022    POTASSIUM 4.2 02/22/2021    Lab Results   Component Value Date    CO2 27 01/18/2022    CO2 29 02/22/2021    Lab Results   Component Value Date    CR 0.99 01/18/2022    CR 1.11 02/22/2021        Lab Results   Component Value Date    WBC 8.8 10/14/2021    HGB 15.0 10/14/2021    HCT 45.1 10/14/2021    MCV 99 10/14/2021     10/14/2021     HgA1c (1/18/22): 9.3%    Lipid Panel (1/18/22): chol 116  HDL 30  LDL 48  Trig 191    Echocardiogram (1/10/18): \"1. Normal left ventricle size and systolic function. LV ejection fraction 55-60%. No regional wall motion abnormalities. 2. Normal right ventricular size and systolic function. 4. No hemodynamically significant valve disease. Trace mitral and tricuspid Regurgitation.\"    I have reviewed the following images:  CT Abd/Pelvis (12/28/20): \"Slight fatty infiltration of the liver is again seen. The spleen and " "tail of the pancreas are again shown to be surgically absent. Chronic stomach wall prominence, particularly along the lesser curvature, is unchanged. Mild fascial plane thickening involving the anterior left pararenal space is similar to the prior study. Mild complex fluid density in the left subphrenic area is unchanged. Bilateral renal cysts are again seen.\"    ABIs (1/27/22):   \"Right ERIN: 0.8. Left ERIN: 0.68 Waveforms: Triphasic in the femoral and popliteal arteries and monophasic in the tibial vessels bilaterally.   Exercise: The patient was exercised on   treadmill at 1.5 mph at a 10% incline for 2.5 minutes total. Patent 1 minute and 45 seconds.  Right exercise ERIN: 0.48; Left exercise ERIN: 0.47.\"           Assessment and Plan:   Mr. Russo is a 76 year old male with history of T2DM, DL, chronic a-fib on coumadin, HTN, obesity, chronic alcoholic pancreatitis, past alcohol abuse, complex prior surgical history (distal pancreatectomy and splenectomy for IPMN; colectomy for C difficile colitis; glossectomy and base of mouth resection, bilateral neck LND for carcinoma), who presents with peripheral arterial disease with claudication.        Discussed cutting back on alcohol use. 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 Explained that for his overall health, he truly needs to stop drinking, and that he may benefit from counseling to help accomplish this. He is reluctant to consider this, but I encouraged him to think about it and to ask if he feels he is ready for a Mental Health referral.     Explained he does have some degree of peripheral arterial disease. He has claudication but no tissue lost or rest pain.     Discussed the role of structured exercise therapy. Will enroll him in PAD rehab.    He may benefit from slightly higher-intensity statin therapy. Continue on " rosuvastatin 10 mg daily for now.     Encouraged him to follow-up with his routine care and his primary care.     Will plan to see him in 6 months to follow-up on progress with PAD rehab. Will repeat ABIs and complete carotid duplex US.     Megan Gilbert MD    Total time spent on the date of this encounter doing: chart review, review of test results, patient visit, physical exam, education, counseling, developing plan of care, and documenting = 60 minutes

## 2022-02-04 NOTE — PROGRESS NOTES
"[facility]        Patient is here for a consult to discuss Peripheral artery disease (PAD). Symptoms include     Pt is currently taking Statin and Warfarin.    BP (!) 151/90 (BP Location: Right arm, Patient Position: Chair, Cuff Size: Adult Regular)   Pulse 108   Ht 5' 10\" (1.778 m)   Wt 200 lb (90.7 kg)   BMI 28.70 kg/m      The provider has been notified that the patient has no concerns.     Questions patient would like addressed today are: N/A.    Refills are needed: N/A    Has homecare services and agency name:  Yakelin Rosado MA    "

## 2022-02-08 ENCOUNTER — ANTICOAGULATION THERAPY VISIT (OUTPATIENT)
Dept: ANTICOAGULATION | Facility: CLINIC | Age: 77
End: 2022-02-08

## 2022-02-08 ENCOUNTER — LAB (OUTPATIENT)
Dept: LAB | Facility: CLINIC | Age: 77
End: 2022-02-08
Payer: COMMERCIAL

## 2022-02-08 DIAGNOSIS — I48.20 CHRONIC ATRIAL FIBRILLATION (H): Primary | ICD-10-CM

## 2022-02-08 DIAGNOSIS — Z79.01 LONG TERM CURRENT USE OF ANTICOAGULANT THERAPY: Chronic | ICD-10-CM

## 2022-02-08 DIAGNOSIS — Z79.01 LONG TERM CURRENT USE OF ANTICOAGULANT THERAPY: ICD-10-CM

## 2022-02-08 DIAGNOSIS — I48.20 CHRONIC ATRIAL FIBRILLATION (H): Chronic | ICD-10-CM

## 2022-02-08 LAB — INR BLD: 2.7 (ref 0.9–1.1)

## 2022-02-08 PROCEDURE — 85610 PROTHROMBIN TIME: CPT

## 2022-02-08 PROCEDURE — 36416 COLLJ CAPILLARY BLOOD SPEC: CPT

## 2022-02-08 NOTE — PROGRESS NOTES
ANTICOAGULATION MANAGEMENT     Antoine Russo 76 year old male is on warfarin with therapeutic INR result. (Goal INR 2.0-3.0)    Recent labs: (last 7 days)     02/08/22  0657   INR 2.7*       ASSESSMENT     Source(s): Chart Review and Patient/Caregiver Call       Warfarin doses taken: Warfarin taken as instructed    Diet: No new diet changes identified    New illness, injury, or hospitalization: No    Medication/supplement changes: None noted    Signs or symptoms of bleeding or clotting: No    Previous INR: Therapeutic last 2(+) visits    Additional findings: None     PLAN     Recommended plan for no diet, medication or health factor changes affecting INR     Dosing Instructions: Continue your current warfarin dose with next INR in 6 weeks       Summary  As of 2/8/2022    Full warfarin instructions:  2.5 mg every Tue, Thu, Sat; 1.25 mg all other days   Next INR check:  3/22/2022             Telephone call with Antoine who verbalizes understanding and agrees to plan    Lab visit scheduled    Education provided: Contact [phone]  with any changes, questions or concerns.     Plan made per [facility] anticoagulation protocol    Maria Victoria Castaneda, RN  Anticoagulation Clinic  2/8/2022    _______________________________________________________________________     Anticoagulation Episode Summary     Current INR goal:  2.0-3.0   TTR:  100.0 % (11.9 mo)   Target end date:  Indefinite   Send INR reminders to:  [facility]    Indications    Chronic atrial fibrillation (H) [I48.20]  Long term current use of anticoagulant therapy [Z79.01]           Comments:           Anticoagulation Care Providers     Provider Role Specialty Phone number    Katie Martino MD Referring Family Medicine [phone]

## 2022-02-19 ENCOUNTER — OFFICE VISIT (OUTPATIENT)
Dept: URGENT CARE | Facility: URGENT CARE | Age: 77
End: 2022-02-19
Payer: COMMERCIAL

## 2022-02-19 VITALS
DIASTOLIC BLOOD PRESSURE: 70 MMHG | OXYGEN SATURATION: 97 % | RESPIRATION RATE: 18 BRPM | HEART RATE: 87 BPM | SYSTOLIC BLOOD PRESSURE: 142 MMHG | WEIGHT: 214.6 LBS | BODY MASS INDEX: 30.79 KG/M2 | TEMPERATURE: 97.4 F

## 2022-02-19 DIAGNOSIS — R30.0 DYSURIA: ICD-10-CM

## 2022-02-19 DIAGNOSIS — R39.9 UTI SYMPTOMS: Primary | ICD-10-CM

## 2022-02-19 LAB
ALBUMIN UR-MCNC: >=300 MG/DL
APPEARANCE UR: CLEAR
BACTERIA #/AREA URNS HPF: ABNORMAL /HPF
BILIRUB UR QL STRIP: NEGATIVE
COLOR UR AUTO: YELLOW
GLUCOSE UR STRIP-MCNC: NEGATIVE MG/DL
HGB UR QL STRIP: ABNORMAL
KETONES UR STRIP-MCNC: NEGATIVE MG/DL
LEUKOCYTE ESTERASE UR QL STRIP: ABNORMAL
NITRATE UR QL: POSITIVE
PH UR STRIP: 5.5 [PH] (ref 5–7)
RBC #/AREA URNS AUTO: ABNORMAL /HPF
SP GR UR STRIP: >=1.03 (ref 1–1.03)
SQUAMOUS #/AREA URNS AUTO: ABNORMAL /LPF
UROBILINOGEN UR STRIP-ACNC: 1 E.U./DL
WBC #/AREA URNS AUTO: ABNORMAL /HPF

## 2022-02-19 PROCEDURE — 81001 URINALYSIS AUTO W/SCOPE: CPT | Performed by: FAMILY MEDICINE

## 2022-02-19 PROCEDURE — 87086 URINE CULTURE/COLONY COUNT: CPT | Performed by: FAMILY MEDICINE

## 2022-02-19 PROCEDURE — 87186 SC STD MICRODIL/AGAR DIL: CPT | Performed by: FAMILY MEDICINE

## 2022-02-19 PROCEDURE — 99213 OFFICE O/P EST LOW 20 MIN: CPT | Performed by: FAMILY MEDICINE

## 2022-02-19 RX ORDER — NITROFURANTOIN 25; 75 MG/1; MG/1
100 CAPSULE ORAL 2 TIMES DAILY
Qty: 14 CAPSULE | Refills: 0 | Status: SHIPPED | OUTPATIENT
Start: 2022-02-19 | End: 2022-02-26

## 2022-02-19 NOTE — PROGRESS NOTES
Assessment & Plan     (R39.9) UTI symptoms  (primary encounter diagnosis)  Comment: Differential discussed in detail including acute cystitis.  UA findings and previous urine cultures reviewed.  Macrobid prescribed, common side effects discussed.  Recommended good hydration and to follow-up if symptoms persist.  Urine culture ordered.  All questions answered.  Plan: nitroFURantoin macrocrystal-monohydrate         (MACROBID) 100 MG capsule       (R30.0) Dysuria  Comment:   Plan: UA macro with reflex to Microscopic and Culture        - Clinc Collect, Urine Microscopic Exam, Urine         Culture             Serafin Ji MD  [facility] [place]    Subjective   Poli or Saleem is a 76 year old who presents for the following health issues     HPI     Concern -   Onset: 3 days   Description: dysuria, frequency and urgency   Intensity: moderate  Progression of Symptoms:  same  Accompanying Signs & Symptoms: no fever, chills, abdominal pain or other relevant systemic symptoms   Previous history of similar problem: UTI in 2020  Therapies tried and outcome: None        Review of Systems   Constitutional, HEENT, cardiovascular, pulmonary, gi and gu systems are negative, except as otherwise noted.      Objective    BP (!) 142/70 (BP Location: Right arm, Patient Position: Sitting, Cuff Size: Adult Regular)   Pulse 87   Temp 97.4  F (36.3  C) (Tympanic)   Resp 18   Wt 97.3 kg (214 lb 9.6 oz)   SpO2 97%   BMI 30.79 kg/m    Body mass index is 30.79 kg/m .  Physical Exam   GENERAL: alert and no distress  NECK: no adenopathy, no asymmetry, masses, or scars and thyroid normal to palpation  RESP: lungs clear to auscultation - no rales, rhonchi or wheezes  CV: regular rate and rhythm, normal S1 S2, no S3 or S4, no murmur, click or rub, no peripheral edema and peripheral pulses strong  ABDOMEN: soft, nontender, no hepatosplenomegaly, no masses  MS: no gross musculoskeletal defects noted, no  edema      Results for orders placed or performed in visit on 02/19/22   UA macro with reflex to Microscopic and Culture - Clinc Collect     Status: Abnormal    Specimen: Urine, Clean Catch   Result Value Ref Range    Color Urine Yellow Colorless, Straw, Light Yellow, Yellow    Appearance Urine Clear Clear    Glucose Urine Negative Negative mg/dL    Bilirubin Urine Negative Negative    Ketones Urine Negative Negative mg/dL    Specific Gravity Urine >=1.030 1.003 - 1.035    Blood Urine Moderate (A) Negative    pH Urine 5.5 5.0 - 7.0    Protein Albumin Urine >=300 (A) Negative mg/dL    Urobilinogen Urine 1.0 0.2, 1.0 E.U./dL    Nitrite Urine Positive (A) Negative    Leukocyte Esterase Urine Trace (A) Negative   Urine Microscopic Exam     Status: Abnormal   Result Value Ref Range    Bacteria Urine Few (A) None Seen /HPF    RBC Urine 5-10 (A) 0-2 /HPF /HPF    WBC Urine 5-10 (A) 0-5 /HPF /HPF    Squamous Epithelials Urine Few (A) None Seen /LPF

## 2022-02-21 LAB — BACTERIA UR CULT: ABNORMAL

## 2022-03-22 ENCOUNTER — ANTICOAGULATION THERAPY VISIT (OUTPATIENT)
Dept: ANTICOAGULATION | Facility: CLINIC | Age: 77
End: 2022-03-22

## 2022-03-22 ENCOUNTER — LAB (OUTPATIENT)
Dept: LAB | Facility: CLINIC | Age: 77
End: 2022-03-22
Payer: COMMERCIAL

## 2022-03-22 DIAGNOSIS — I48.20 CHRONIC ATRIAL FIBRILLATION (H): Primary | ICD-10-CM

## 2022-03-22 DIAGNOSIS — I48.20 CHRONIC ATRIAL FIBRILLATION (H): Chronic | ICD-10-CM

## 2022-03-22 DIAGNOSIS — Z79.01 LONG TERM CURRENT USE OF ANTICOAGULANT THERAPY: Chronic | ICD-10-CM

## 2022-03-22 DIAGNOSIS — Z79.01 LONG TERM CURRENT USE OF ANTICOAGULANT THERAPY: ICD-10-CM

## 2022-03-22 LAB — INR BLD: 2.4 (ref 0.9–1.1)

## 2022-03-22 PROCEDURE — 85610 PROTHROMBIN TIME: CPT

## 2022-03-22 PROCEDURE — 36416 COLLJ CAPILLARY BLOOD SPEC: CPT

## 2022-03-22 NOTE — PROGRESS NOTES
ANTICOAGULATION MANAGEMENT     Antoine Russo 76 year old male is on warfarin with therapeutic INR result. (Goal INR 2.0-3.0)    Recent labs: (last 7 days)     03/22/22  0654   INR 2.4*       ASSESSMENT       Source(s): Chart Review and Patient/Caregiver Call       Warfarin doses taken: Warfarin taken as instructed    Diet: No new diet changes identified    New illness, injury, or hospitalization: No    Medication/supplement changes: None noted    Signs or symptoms of bleeding or clotting: No    Previous INR: Therapeutic last 2(+) visits    Additional findings: None       PLAN     Recommended plan for no diet, medication or health factor changes affecting INR     Dosing Instructions: Continue your current warfarin dose with next INR in 6 weeks       Summary  As of 3/22/2022    Full warfarin instructions:  2.5 mg every Tue, Thu, Sat; 1.25 mg all other days   Next INR check:  5/4/2022             Telephone call with Antoine who verbalizes understanding and agrees to plan    Lab visit scheduled    Education provided: Contact [phone]  with any changes, questions or concerns.     Plan made per [facility] anticoagulation protocol    Maria Victoria Castaneda, RN  Anticoagulation Clinic  3/22/2022    _______________________________________________________________________     Anticoagulation Episode Summary     Current INR goal:  2.0-3.0   TTR:  100.0 % (1 y)   Target end date:  Indefinite   Send INR reminders to:  [facility]    Indications    Chronic atrial fibrillation (H) [I48.20]  Long term current use of anticoagulant therapy [Z79.01]           Comments:           Anticoagulation Care Providers     Provider Role Specialty Phone number    Katie Martino MD Referring Family Medicine [phone]

## 2022-04-08 DIAGNOSIS — E11.40 TYPE 2 DIABETES MELLITUS WITH DIABETIC NEUROPATHY, WITH LONG-TERM CURRENT USE OF INSULIN (H): ICD-10-CM

## 2022-04-08 DIAGNOSIS — Z79.4 TYPE 2 DIABETES MELLITUS WITH DIABETIC NEUROPATHY, WITH LONG-TERM CURRENT USE OF INSULIN (H): ICD-10-CM

## 2022-04-08 RX ORDER — FLASH GLUCOSE SENSOR
KIT MISCELLANEOUS
Qty: 6 EACH | Refills: 3 | Status: SHIPPED | OUTPATIENT
Start: 2022-04-08 | End: 2022-11-21

## 2022-04-12 NOTE — PATIENT INSTRUCTIONS
Patient Education     Nosebleed (Adult)    Bleeding from the nose most commonly occurs because of injury or drying and cracking of the inner lining of the nose. Most nosebleeds are because of dry air or nose-picking. They can occur during a common cold or an allergy attack. They can also occur on a very hot day, or from dry air in the winter.  If the bleeding site is found, it may be cauterized. This means it is treated to cause a blood clot to form. This may be done with a chemical, heat, or electricity. If the bleeding continues after the site is cauterized, or if the site cannot be found, packing may be put in your nose. This is to apply pressure and stop the bleeding. The packing may be made of gauze or sponge. A small balloon catheter is sometimes used. These must be removed by your healthcare provider. Some types of packing dissolve on their own. If you are taking blood thinning (anticoagulant) medicine, you may have a blood test.  Home care    If packing was put in your nose, unless told otherwise, do not pull on it or try to remove it yourself. You will be given an appointment to have it removed. You may also have been given antibiotics to prevent a sinus infection. If so, finish all of the medicine.    Don't blow your nose for 12 hours after the bleeding stops. This will allow a strong blood clot to form. Don't pick your nose. This may restart bleeding.    Don't drink alcohol or hot liquids for the next 2 days. Alcohol or hot liquids in your mouth can dilate blood vessels in your nose. This can cause bleeding to start again.    Don't take ibuprofen, naproxen, or medicines that contain aspirin. These thin the blood and may cause your nose to bleed. You may take acetaminophen for pain, unless another pain medicine was prescribed.    If the bleeding starts again, sit up and lean forward to prevent swallowing blood. Pinch your nose tightly on both sides, as shown above, for 10 to 15 minutes. Time yourself.  Sitter placed at bedside for elopment and SI.    Don t release the pressure on your nose until 10 minutes is up. If bleeding does not stop, continue to pinch your nose and call your healthcare provider or return to this facility.    If you have a cold, allergies, or dry nasal membranes, lubricate the nasal passages. Apply a small amount of petroleum jelly inside the nose with a cotton swab twice a day (morning and night).    Don't overheat your home. This can dry the air and make your condition worse.    Put a humidifier in the room where you sleep. This will add moisture to the air. Clean the humidifier as advised by the .    Use a saline nasal spray to keep nasal passages moist.    Don't pick your nose. Keep fingernails trimmed to decrease risk of bleeds.    Don't smoke.  Follow-up care  Follow up with your healthcare provider, or as advised. Nasal packing should be rechecked or removed within 2 to 3 days.  When to seek medical advice  Call your healthcare provider right away if any of these occur.    You have another nosebleed that you cannot control    Dizziness, weakness, or fainting    You become tired or confused    Fever of 100.4 F (38 C) or higher, or as directed by your healthcare provider    Headache    Sinus or facial pain    Shortness of breath or trouble breathing  Date Last Reviewed: 11/1/2017 2000-2019 [employer]. [address] [place] [address], [place] [address]. All rights reserved. This information is not intended as a substitute for professional medical care. Always follow your healthcare professional's instructions.

## 2022-04-13 ENCOUNTER — OFFICE VISIT (OUTPATIENT)
Dept: URGENT CARE | Facility: URGENT CARE | Age: 77
End: 2022-04-13
Payer: COMMERCIAL

## 2022-04-13 VITALS
BODY MASS INDEX: 31.57 KG/M2 | RESPIRATION RATE: 20 BRPM | TEMPERATURE: 97.7 F | OXYGEN SATURATION: 93 % | WEIGHT: 220 LBS | SYSTOLIC BLOOD PRESSURE: 138 MMHG | HEART RATE: 100 BPM | DIASTOLIC BLOOD PRESSURE: 72 MMHG

## 2022-04-13 DIAGNOSIS — F10.20 ALCOHOL DEPENDENCE, UNCOMPLICATED (H): ICD-10-CM

## 2022-04-13 DIAGNOSIS — E11.42 TYPE 2 DIABETES MELLITUS WITH DIABETIC POLYNEUROPATHY, WITHOUT LONG-TERM CURRENT USE OF INSULIN (H): Chronic | ICD-10-CM

## 2022-04-13 DIAGNOSIS — Q89.01 SPLEEN ABSENT: Chronic | ICD-10-CM

## 2022-04-13 DIAGNOSIS — L03.113 CELLULITIS OF RIGHT UPPER EXTREMITY: Primary | ICD-10-CM

## 2022-04-13 PROCEDURE — 99214 OFFICE O/P EST MOD 30 MIN: CPT

## 2022-04-13 RX ORDER — CEPHALEXIN 500 MG/1
500 CAPSULE ORAL 4 TIMES DAILY
Qty: 40 CAPSULE | Refills: 0 | Status: SHIPPED | OUTPATIENT
Start: 2022-04-13 | End: 2022-04-23

## 2022-04-13 NOTE — PROGRESS NOTES
Assessment & Plan     Cellulitis of right upper extremity  Spleen absent  Type 2 diabetes mellitus with diabetic polyneuropathy, without long-term current use of insulin (H)  Alcohol dependence, uncomplicated (H)  Patient states he thinks he was bitten by something on 4/11/2022 and noticed a quarter sized red spot on his right bicep. Today the area is the size of an orange with erythema and warmth to the touch. A centralized mass is felt under the skin, patient denies pain or tenderness. No chills or fever are present and VS are within normal limits. Patient has a history of Splenectomy, ETOH dependence and type 2 diabetes using evidence based practice patient will start taking Keflex 4x a day for 10 days however given higher risk for sepsis from co-morbidities emphasized the importance of if the erythema, swelling ,fever or chills occur he needs to report to the emergency department for care. Patient stated understanding. Patients cellulitis was outlined today so he is able to watch for increased swelling and redness.  - cephALEXin (KEFLEX) 500 MG capsule  Dispense: 40 capsule; Refill: 0       Physician Attestation   I, FERNANDO June CNP, saw this patient and agree with the findings and plan of care as documented in the note by Adriana Bowman, JUAN R student.    FERNANDO June CNP      No follow-ups on file.    ROSEANNA SAME DAY PROVIDER  [facility] [place]    Terri Monge is a 76 year old male who presents to clinic today for the following health issues:  Chief Complaint   Patient presents with     Pain     Right arm pain, swelling and redness.. possible bug bite.     HPI      Rash    Onset of rash was 3 day(s) ago.   Course of illness is worsening.  Severity moderate  Current and Associated symptoms: red   Location of the rash: right inner bicep.  Previous history of a similar rash? No  Recent exposure history: none known  Denies exposure to: none  known  Associated symptoms include: nothing.  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)     Peripheral vascular disease (H)     Colitis     Groin fluid collection     Clostridium difficile enterocolitis     Health Care Home     H/O colectomy     IPMN (intraductal papillary mucinous neoplasm)     Pancreatic duct leak     Type 2 diabetes mellitus with diabetic polyneuropathy, without long-term current use of insulin (H)     Left varicocele     Anticipated difficulty with intubation     Spleen absent     Chronic atrial fibrillation (H)     Recurrent pancreatitis     Long term current use of anticoagulant therapy     Acute pancreatitis     Pancreatic pseudocyst     Alcohol dependence, uncomplicated (H)     Acute right-sided low back pain with left-sided sciatica     Spinal stenosis of lumbar region, unspecified whether neurogenic claudication present     Peripheral polyneuropathy     Chronic low back pain with sciatica, sciatica laterality unspecified, unspecified back pain laterality     Acute gastritis     Acute recurrent pancreatitis     Acute gastritis without hemorrhage, unspecified gastritis type     Alcohol abuse     Leukocytosis     Nausea with vomiting     Dehydration     Encounter for screening laboratory testing for COVID-19 virus     Chronic kidney disease, stage 2 (mild)     Current Outpatient Medications   Medication     cephALEXin (KEFLEX) 500 MG capsule     Continuous Blood Gluc Sensor (FREESTYLE LISA 14 DAY SENSOR) MISC     insulin NPH  (NOVOLIN N FLEXPEN RELION) 100 UNIT/ML injection     insulin pen needle (BD LUIS U/F) 32G X 4 MM miscellaneous     lisinopril (ZESTRIL) 10 MG tablet     metoprolol succinate ER (TOPROL-XL) 50 MG 24 hr tablet     multivitamin, therapeutic with minerals (THERA-VIT-M) TABS     pregabalin (LYRICA) 150 MG capsule     rosuvastatin (CRESTOR) 10 MG tablet     tamsulosin (FLOMAX) 0.4 MG capsule     vitamin B-12 (CYANOCOBALAMIN) 100 MCG tablet     Vitamin D3 (CHOLECALCIFEROL) 25 mcg (1000 units) tablet     warfarin ANTICOAGULANT (COUMADIN) 2.5 MG tablet     ASPIRIN NOT PRESCRIBED (INTENTIONAL)     No current facility-administered medications for this visit.         Review of Systems  Constitutional, HEENT, cardiovascular, pulmonary, gi and gu systems are negative, except as otherwise noted.      Objective    /72 (BP Location: Right arm, Patient Position: Sitting, Cuff Size: Adult Regular)   Pulse 100   Temp 97.7  F (36.5  C) (Tympanic)   Resp 20   Wt 99.8 kg (220 lb)   SpO2 93%   BMI 31.57 kg/m    Physical Exam   GENERAL: healthy, alert and no distress  NECK: no adenopathy, no asymmetry, masses, or scars and thyroid normal to palpation  RESP: lungs clear to auscultation - no rales, rhonchi or wheezes  CV: regular rate and rhythm, normal S1 S2, no S3 or S4, no murmur, click or rub, no peripheral edema and peripheral pulses strong  ABDOMEN: soft, nontender, no hepatosplenomegaly, no masses and bowel sounds normal  MS: no gross musculoskeletal defects noted, no edema  SKIN: erythema, swelling and redness on right inner bicep approximately 5 cm diameter

## 2022-04-13 NOTE — PATIENT INSTRUCTIONS
Watch for spreading of the redness and if this happens return to urgent care.    If you develop fever or chills, go to the ER.    Treatment: Start cephalexin 4 times daily for 10 days.    Alma Rosa Aleman, CNP

## 2022-04-15 ENCOUNTER — APPOINTMENT (OUTPATIENT)
Dept: ULTRASOUND IMAGING | Facility: CLINIC | Age: 77
End: 2022-04-15
Attending: NURSE PRACTITIONER
Payer: COMMERCIAL

## 2022-04-15 ENCOUNTER — DOCUMENTATION ONLY (OUTPATIENT)
Dept: ANTICOAGULATION | Facility: CLINIC | Age: 77
End: 2022-04-15

## 2022-04-15 ENCOUNTER — HOSPITAL ENCOUNTER (EMERGENCY)
Facility: CLINIC | Age: 77
Discharge: HOME | End: 2022-04-15
Attending: NURSE PRACTITIONER | Admitting: NURSE PRACTITIONER
Payer: COMMERCIAL

## 2022-04-15 VITALS
BODY MASS INDEX: 28.63 KG/M2 | HEIGHT: 70 IN | DIASTOLIC BLOOD PRESSURE: 95 MMHG | TEMPERATURE: 98.3 F | HEART RATE: 76 BPM | WEIGHT: 200 LBS | OXYGEN SATURATION: 93 % | SYSTOLIC BLOOD PRESSURE: 169 MMHG | RESPIRATION RATE: 20 BRPM

## 2022-04-15 DIAGNOSIS — L03.113 RIGHT ARM CELLULITIS: ICD-10-CM

## 2022-04-15 LAB
ANION GAP SERPL CALCULATED.3IONS-SCNC: 7 MMOL/L (ref 3–14)
BASOPHILS # BLD MANUAL: 0 10E3/UL (ref 0–0.2)
BASOPHILS NFR BLD MANUAL: 0 %
BUN SERPL-MCNC: 17 MG/DL (ref 7–30)
CALCIUM SERPL-MCNC: 9.3 MG/DL (ref 8.5–10.1)
CHLORIDE BLD-SCNC: 105 MMOL/L (ref 94–109)
CO2 SERPL-SCNC: 27 MMOL/L (ref 20–32)
CREAT SERPL-MCNC: 0.9 MG/DL (ref 0.66–1.25)
CRP SERPL-MCNC: 14.3 MG/L (ref 0–8)
EOSINOPHIL # BLD MANUAL: 0.2 10E3/UL (ref 0–0.7)
EOSINOPHIL NFR BLD MANUAL: 2 %
ERYTHROCYTE [DISTWIDTH] IN BLOOD BY AUTOMATED COUNT: 15.9 % (ref 10–15)
GFR SERPL CREATININE-BSD FRML MDRD: 90 ML/MIN/1.73M2
GLUCOSE BLD-MCNC: 294 MG/DL (ref 70–99)
HCT VFR BLD AUTO: 48.3 % (ref 40–53)
HGB BLD-MCNC: 16 G/DL (ref 13.3–17.7)
HOLD SPECIMEN: NORMAL
HOWELL-JOLLY BOD BLD QL SMEAR: PRESENT
INR PPP: 3.12 (ref 0.85–1.15)
LYMPHOCYTES # BLD MANUAL: 1.8 10E3/UL (ref 0.8–5.3)
LYMPHOCYTES NFR BLD MANUAL: 22 %
MCH RBC QN AUTO: 33 PG (ref 26.5–33)
MCHC RBC AUTO-ENTMCNC: 33.1 G/DL (ref 31.5–36.5)
MCV RBC AUTO: 100 FL (ref 78–100)
MONOCYTES # BLD MANUAL: 1 10E3/UL (ref 0–1.3)
MONOCYTES NFR BLD MANUAL: 12 %
NEUTROPHILS # BLD MANUAL: 5.1 10E3/UL (ref 1.6–8.3)
NEUTROPHILS NFR BLD MANUAL: 64 %
NRBC # BLD AUTO: 0.2 10E3/UL
NRBC BLD MANUAL-RTO: 2 %
PLAT MORPH BLD: ABNORMAL
PLATELET # BLD AUTO: 185 10E3/UL (ref 150–450)
POTASSIUM BLD-SCNC: 4.5 MMOL/L (ref 3.4–5.3)
RBC # BLD AUTO: 4.85 10E6/UL (ref 4.4–5.9)
RBC MORPH BLD: ABNORMAL
SODIUM SERPL-SCNC: 139 MMOL/L (ref 133–144)
WBC # BLD AUTO: 8 10E3/UL (ref 4–11)

## 2022-04-15 PROCEDURE — 99283 EMERGENCY DEPT VISIT LOW MDM: CPT | Performed by: NURSE PRACTITIONER

## 2022-04-15 PROCEDURE — 80048 BASIC METABOLIC PNL TOTAL CA: CPT | Performed by: NURSE PRACTITIONER

## 2022-04-15 PROCEDURE — 36415 COLL VENOUS BLD VENIPUNCTURE: CPT | Performed by: NURSE PRACTITIONER

## 2022-04-15 PROCEDURE — 85610 PROTHROMBIN TIME: CPT | Performed by: NURSE PRACTITIONER

## 2022-04-15 PROCEDURE — 93971 EXTREMITY STUDY: CPT | Mod: RT

## 2022-04-15 PROCEDURE — 99284 EMERGENCY DEPT VISIT MOD MDM: CPT | Mod: 25 | Performed by: NURSE PRACTITIONER

## 2022-04-15 PROCEDURE — 85027 COMPLETE CBC AUTOMATED: CPT | Performed by: NURSE PRACTITIONER

## 2022-04-15 PROCEDURE — 86140 C-REACTIVE PROTEIN: CPT | Performed by: NURSE PRACTITIONER

## 2022-04-15 PROCEDURE — 99284 EMERGENCY DEPT VISIT MOD MDM: CPT | Performed by: NURSE PRACTITIONER

## 2022-04-15 NOTE — ED PROVIDER NOTES
History     Chief Complaint   Patient presents with     Insect Bite     Seen in [place] 2 days ago. Bite to the right upper arm. Was told to be seen if it gets worse.      HPI  Antoine Russo is a 76 year old male who presents for recheck of right arm cellulitis. Patient noted a tender lump to his proximal medial-volar aspect of his right forearm on 4/11. He thought is was possibly an insect bite but it has never been pruritic. Denies any known injury. The area became more red and swollen and he was evaluated in clinic on 4/13. He was started on Cephalexin for cellulitis and has taken 7 doses so far. He was instructed to recheck in the ED if he noted increased redness. The site was marked. He noted the redness to be darker and increased today and presented here for recheck. Denies fever or chills. He otherwise feels fine.  He is on Coumadin for history of A-fib.    Allergies:  Allergies   Allergen Reactions     Nkda [No Known Drug Allergies]        Problem List:    Patient Active Problem List    Diagnosis Date Noted     Anticipated difficulty with intubation 05/23/2017     Priority: High     Class: Chronic     Difficult two hands mask ventilation, intubated multiple times asleep with video laryngoscope. H/o tongue cancer surgery.        Chronic kidney disease, stage 2 (mild) 01/20/2022     Priority: Medium     Alcohol abuse 02/16/2021     Priority: Medium     Leukocytosis 02/16/2021     Priority: Medium     Nausea with vomiting 02/16/2021     Priority: Medium     Dehydration 02/16/2021     Priority: Medium     Encounter for screening laboratory testing for COVID-19 virus 02/16/2021     Priority: Medium     Acute recurrent pancreatitis 10/17/2020     Priority: Medium     Acute gastritis without hemorrhage, unspecified gastritis type 10/17/2020     Priority: Medium     Added automatically from request for surgery [id number]       Acute gastritis 10/03/2020     Priority: Medium     Peripheral polyneuropathy 05/12/2020      Priority: Medium     Chronic low back pain with sciatica, sciatica laterality unspecified, unspecified back pain laterality 05/12/2020     Priority: Medium     Alcohol dependence, uncomplicated (H) 02/04/2020     Priority: Medium     Acute right-sided low back pain with left-sided sciatica 02/04/2020     Priority: Medium     Spinal stenosis of lumbar region, unspecified whether neurogenic claudication present 02/04/2020     Priority: Medium     Pancreatic pseudocyst 04/18/2019     Priority: Medium     Acute pancreatitis 10/21/2018     Priority: Medium     Long term current use of anticoagulant therapy 04/05/2018     Priority: Medium     Recurrent pancreatitis 03/30/2018     Priority: Medium     Chronic atrial fibrillation (H) 01/23/2018     Priority: Medium     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 Please refer if Care Coordination services are needed.    Presenting Problem Signs and Symptoms Treatment Plan   Questions or concerns   during clinic hours   I will call my clinic directly:  [address] [address]  [phone].   Questions or concerns outside clinic hours   I will call the 24 hour nurse line at   [phone] or [address] [place] [address].   Need to schedule an appointment   I will call the 24 hour scheduling team at [phone] or my clinic directly at [phone].   Same day treatment     I will call my clinic first, nurse line if after hours,  urgent care and express care if needed.   Clinic care coordination services (regular clinic hours)   I will call a clinic care coordinator directly:     Shelia Emanuel RN [facility]  [phone]    JAY Estes:    [phone]    Or call my clinic at [phone] and ask to speak with care coordination.   Crisis Services: Behavioral or Mental Health  [facility] 24 Hour Phone Line  [phone]    [facility] 24 Hour Crisis Services  [phone]    [facility] (Behavioral Health Providers) Network [phone]    [facility]   [phone]     Emergency treatment -- Immediately    [facility] 911                Clostridium difficile enterocolitis 12/18/2012     Priority: Medium     Groin fluid collection 12/15/2012     Priority: Medium     CT 12/12- New (since 5/11) collection measuring at least 2.3 cm in diameter and 6.5 cm in length within the right inguinal canal. 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 Gerson Ravi and aristides at the  in 2012  NAD 2017  CT scan of the neck every 2-3 years.  - Thyroid labs yearly.  - Carotid ultrasound in three to four years to evaluate for stenosis.       Hyperlipidemia LDL goal <100 10/31/2010     Priority: Medium     CAD (coronary artery disease) 05/26/2009     Priority: Medium     Stress testing 2009 showed inferior wall ischemia.  Cath preformed; identified moderate CAD with stenosis of 40-50% in LAD and RCA.  No stents were placed.  Echo in 01/2018 (done while in Afib with rates of 100-110); EF of 55-60%.  No RWMA.       GERD (gastroesophageal reflux disease) 05/26/2009      Priority: Medium     Hypertrophy of breast 09/25/2007     Priority: Medium     PERS HX TOBACCO USE - quit in 11/06 with chantix 03/15/2007     Priority: Medium     Hypertension, benign essential, goal below 140/90 11/07/2005     Priority: Medium     Patient has only fair bp control and with family history of diabetes will all ace if lab indicated also has bph and may op for psa and not digital exam next yr        Pain in joint, shoulder region 11/07/2005     Priority: Medium     Hypertrophy of prostate without urinary obstruction 11/07/2005     Priority: Medium     Problem list name updated by automated process. Provider to review       Impotence of organic origin 11/07/2005     Priority: Medium        Past Medical History:    Past Medical History:   Diagnosis Date     Acute kidney injury (H) 04/17/2019     Acute on chronic pancreatitis (H) 01/23/2018     Bacteriuria with pyuria 04/17/2019     C. difficile colitis      Chronic atrial fibrillation (H)      Colon polyp 08/26/2011     Diabetes mellitus (H)      Hypertension      Malignant neoplasm (H) 08/2012     Recurrent pancreatitis        Past Surgical History:    Past Surgical History:   Procedure Laterality Date     BREAST SURGERY  01/01/2008    right breast mass benign     COLECTOMY SUBTOTAL  2013    With diverting ostomy creation; done for toxic C difficile colitis     COLONOSCOPY      multiple polyps removed     COLONOSCOPY  08/24/2011    TUMOR/POLYP/LESION BY SNARE     COLONOSCOPY  12/17/2012    COLONOSCOPY     COLONOSCOPY  12/18/2012    COLONOSCOPY     DENERVATION OF SPERMATIC CORD MICROSURGICAL Left 05/23/2017    Procedure: DENERVATION OF SPERMATIC CORD MICROSURGICAL;;  Surgeon: Marcio Aggarwal MD;  [facility]     DISSECTION RADICAL NECK BILATERAL  08/02/2012    Procedure: DISSECTION RADICAL NECK BILATERAL;;  Surgeon: Yung Alvares MD;  [facility]     ENDOSCOPIC RETROGRADE CHOLANGIOPANCREATOGRAM N/A 05/10/2016    Procedure: COMBINED  ENDOSCOPIC RETROGRADE CHOLANGIOPANCREATOGRAPHY, PLACE TUBE/STENT;  Surgeon: Yovanny Beasley MD;  [facility]     ENDOSCOPIC RETROGRADE CHOLANGIOPANCREATOGRAM N/A 03/29/2018    Procedure: ENDOSCOPIC RETROGRADE CHOLANGIOPANCREATOGRAM;  Endoscopic Retrograde Cholangiopancreatogram, Endoscopic Ultrasound, Biliary Sphincterotomy, Biliary and Pancreatic Stent Placement;  Surgeon: Yovanny Beasley MD;  [facility]     ENDOSCOPIC ULTRASOUND UPPER GASTROINTESTINAL TRACT (GI) N/A 02/03/2016    Procedure: ENDOSCOPIC ULTRASOUND, ESOPHAGOSCOPY / UPPER GASTROINTESTINAL TRACT (GI);  Surgeon: Grabiel Plata MD;  [facility]     ENDOSCOPIC ULTRASOUND UPPER GASTROINTESTINAL TRACT (GI) N/A 03/29/2018    Procedure: ENDOSCOPIC ULTRASOUND, ESOPHAGOSCOPY / UPPER GASTROINTESTINAL TRACT (GI);;  Surgeon: Grabiel Plata MD;  [facility]     ESOPHAGOSCOPY, GASTROSCOPY, DUODENOSCOPY (EGD), COMBINED N/A 02/03/2016    Procedure: COMBINED ENDOSCOPIC ULTRASOUND, ESOPHAGOSCOPY, GASTROSCOPY, DUODENOSCOPY (EGD), FINE NEEDLE ASPIRATE/BIOPSY;  Surgeon: Grabiel Plata MD;  [facility]     ESOPHAGOSCOPY, GASTROSCOPY, DUODENOSCOPY (EGD), COMBINED N/A 06/08/2016    Procedure: COMBINED ESOPHAGOSCOPY, GASTROSCOPY, DUODENOSCOPY (EGD), REMOVE FOREIGN BODY;  Surgeon: Yovanny Beasley MD;  [facility]     ESOPHAGOSCOPY, GASTROSCOPY, DUODENOSCOPY (EGD), COMBINED N/A 04/17/2018    Procedure: COMBINED ESOPHAGOSCOPY, GASTROSCOPY, DUODENOSCOPY (EGD), REMOVE FOREIGN BODY;  EGD with stent removal;  Surgeon: Grabiel Plata MD;  [facility]     EXCISE LESION INTRAORAL  06/14/2012    Procedure: EXCISE LESION INTRAORAL;  Wide Local Excision Floor of Mouth, Direct Laryngoscopy, Bilateral Ida's Marsuplization, Split Thickness Skin Graft from right Thigh  Latex Safe;  Surgeon: Gerson Ravi MD;  [facility]     EXCISE LESION INTRAORAL  08/02/2012    Procedure: EXCISE LESION INTRAORAL;  Floor of  Mouth Resection, Bilateral Selective Radical Neck Dissection, Tracheostomy, Left Radial Forearm  Free Flap with Alloderm, Nasogastric Feeding Tube Placement,    * Latex Safe*;  Surgeon: Gerson Ravi MD;  [facility]     EXCISE LESION INTRAORAL  2012    Procedure: EXCISE LESION INTRAORAL;  takedown of oral flap;  Surgeon: Yung Alvares MD;  [facility]     GRAFT FREE VASCULARIZED (LOCATION)  2012    Procedure: GRAFT FREE VASCULARIZED (LOCATION);;  Surgeon: Yung Alvares MD;  [facility]     GRAFT SKIN SPLIT THICKNESS FROM EXTREMITY  2012    Procedure: GRAFT SKIN SPLIT THICKNESS FROM EXTREMITY;;  Surgeon: Gerson Ravi MD;  [facility]     LAPAROSCOPIC ILEOSTOMY TAKEDOWN  2013    LAPAROSCOPIC ILEOSTOMY TAKEDOWN     LAPAROTOMY EXPLORATORY  2012    LAPAROTOMY EXPLORATORY with Colectomy     LARYNGOSCOPY  2012    Procedure: LARYNGOSCOPY;;  Surgeon: Gerson Ravi MD;  [facility]     ORTHOPEDIC SURGERY      ganglian cyst left ankle     PANCREATECTOMY, SPLENECTOMY N/A 03/10/2016    Procedure: PANCREATECTOMY, SPLENECTOMY;  Surgeon: Nael Abel MD;  [facility]     SHOULDER SURGERY  ,     2006- right rotator cuff, 2008 bone spur on left. Dr. Hdez     VARICOCELECTOMY Left 2017    Procedure: VARICOCELECTOMY;  Left Varicocele Repair, Denervation of Left Testis;  Surgeon: Marcio Aggarwal MD;  [facility]       Family History:    Family History   Problem Relation Age of Onset     Diabetes Sister         onset age 50     Alzheimer Disease Mother          80     Alzheimer Disease Father          85     Diabetes Other         nephew type 1     Diabetes Other      Aneurysm Sister      Anesthesia Reaction No family hx of      Colon Cancer No family hx of      Colon Polyps No family hx of      Crohn's Disease No family hx of      Ulcerative Colitis No family hx of        Social History:  Marital Status:   [2]  Social History  "    Tobacco Use     Smoking status: Former Smoker     Packs/day: 1.00     Years: 40.00     Pack years: 40.00     Types: Cigarettes     Quit date: 11/24/2006     Years since quitting: 15.4     Smokeless tobacco: Never Used   Vaping Use     Vaping Use: Never used   Substance Use Topics     Alcohol use: Yes     Drug use: Never        Medications:    ASPIRIN NOT PRESCRIBED (INTENTIONAL)  cephALEXin (KEFLEX) 500 MG capsule  Continuous Blood Gluc Sensor (FREESTYLE LISA 14 DAY SENSOR) MISC  insulin NPH (NOVOLIN N FLEXPEN RELION) 100 UNIT/ML injection  insulin pen needle (BD LUIS U/F) 32G X 4 MM miscellaneous  lisinopril (ZESTRIL) 10 MG tablet  metoprolol succinate ER (TOPROL-XL) 50 MG 24 hr tablet  multivitamin, therapeutic with minerals (THERA-VIT-M) TABS  pregabalin (LYRICA) 150 MG capsule  rosuvastatin (CRESTOR) 10 MG tablet  tamsulosin (FLOMAX) 0.4 MG capsule  vitamin B-12 (CYANOCOBALAMIN) 100 MCG tablet  Vitamin D3 (CHOLECALCIFEROL) 25 mcg (1000 units) tablet  warfarin ANTICOAGULANT (COUMADIN) 2.5 MG tablet          Review of Systems  As mentioned above in the history present illness. All other systems were reviewed and are negative.    Physical Exam   BP: (!) 169/95  Pulse: 76  Temp: 98.3  F (36.8  C)  Resp: 20  Height: 177.8 cm (5' 10\")  Weight: 90.7 kg (200 lb)  SpO2: 93 %      Physical Exam  Constitutional:       General: He is not in acute distress.     Appearance: Normal appearance. He is not ill-appearing.   HENT:      Right Ear: External ear normal.      Left Ear: External ear normal.      Nose: Nose normal.      Mouth/Throat:      Pharynx: Oropharynx is clear.   Eyes:      Conjunctiva/sclera: Conjunctivae normal.   Cardiovascular:      Rate and Rhythm: Normal rate and regular rhythm.   Pulmonary:      Effort: Pulmonary effort is normal.      Breath sounds: Normal breath sounds.   Musculoskeletal:         General: Normal range of motion.   Skin:     General: Skin is warm and dry.      Findings: Erythema " (right forearm (see picture)) present.   Neurological:      General: No focal deficit present.      Mental Status: He is alert and oriented to person, place, and time.             ED Course                 Procedures              Results for orders placed or performed during the hospital encounter of 04/15/22 (from the past 24 hour(s))   CBC with platelets differential    Narrative    The following orders were created for panel order CBC with platelets differential.  Procedure                               Abnormality         Status                     ---------                               -----------         ------                     CBC with platelets and d...[[id number]]  Abnormal            Final result               Manual Differential[[id number]]          Abnormal            Final result                 Please view results for these tests on the individual orders.   INR   Result Value Ref Range    INR 3.12 (H) 0.85 - 1.15   Basic metabolic panel   Result Value Ref Range    Sodium 139 133 - 144 mmol/L    Potassium 4.5 3.4 - 5.3 mmol/L    Chloride 105 94 - 109 mmol/L    Carbon Dioxide (CO2) 27 20 - 32 mmol/L    Anion Gap 7 3 - 14 mmol/L    Urea Nitrogen 17 7 - 30 mg/dL    Creatinine 0.90 0.66 - 1.25 mg/dL    Calcium 9.3 8.5 - 10.1 mg/dL    Glucose 294 (H) 70 - 99 mg/dL    GFR Estimate 90 >60 mL/min/1.73m2   CBC with platelets and differential   Result Value Ref Range    WBC Count 8.0 4.0 - 11.0 10e3/uL    RBC Count 4.85 4.40 - 5.90 10e6/uL    Hemoglobin 16.0 13.3 - 17.7 g/dL    Hematocrit 48.3 40.0 - 53.0 %     78 - 100 fL    MCH 33.0 26.5 - 33.0 pg    MCHC 33.1 31.5 - 36.5 g/dL    RDW 15.9 (H) 10.0 - 15.0 %    Platelet Count 185 150 - 450 10e3/uL   Extra Tube    Narrative    The following orders were created for panel order Extra Tube.  Procedure                               Abnormality         Status                     ---------                               -----------         ------                      Extra Red Top Tube[[id number]]                               Final result                 Please view results for these tests on the individual orders.   Extra Red Top Tube   Result Value Ref Range    Hold Specimen JIC    CRP Inflammation   Result Value Ref Range    CRP Inflammation 14.3 (H) 0.0 - 8.0 mg/L   Manual Differential   Result Value Ref Range    % Neutrophils 64 %    % Lymphocytes 22 %    % Monocytes 12 %    % Eosinophils 2 %    % Basophils 0 %    NRBCs per 100 WBC 2 (H) <=0 %    Absolute Neutrophils 5.1 1.6 - 8.3 10e3/uL    Absolute Lymphocytes 1.8 0.8 - 5.3 10e3/uL    Absolute Monocytes 1.0 0.0 - 1.3 10e3/uL    Absolute Eosinophils 0.2 0.0 - 0.7 10e3/uL    Absolute Basophils 0.0 0.0 - 0.2 10e3/uL    Absolute NRBCs 0.2 (H) <=0.0 10e3/uL    RBC Morphology Confirmed RBC Indices     Platelet Assessment (A) Automated Count Confirmed. Platelet morphology is normal.     Automated Count Confirmed. Giant platelets are present.    [place] Bodies Present (A) None Seen   US Upper Extremity Venous Duplex Right    Narrative    ULTRASOUND RIGHT UPPER EXTREMITY VENOUS DUPLEX  4/15/2022 12:38 PM    CLINICAL HISTORY/INDICATION:  Right proximal forearm swollen lump,  redness and tenderness.    COMPARISON:  None relevant    TECHNIQUE:  Grayscale, color-flow, and spectral waveform analysis were  performed of the deep veins of the right upper extremity    FINDINGS:  The right jugular vein demonstrates normal compressibility,  color-flow and spectral waveform.    The right subclavian vein, axillary vein, cephalic vein, brachial vein  and basilic vein demonstrate normal compressibility, spectral  waveform, color flow and augmentation. At the site of patient's  palpable lump there is a hypoechoic lesion with no internal color flow  measuring 0.5 x 0.4 x 0.5 cm.      Impression    IMPRESSION:   1. No evidence of deep venous thrombosis in the right upper extremity.  2. Indeterminate hypoechoic lesion with no internal color  flow at the  site of patient's palpable lump and redness measuring 0.5 cm.     *Note: Due to a large number of results and/or encounters for the requested time period, some results have not been displayed. A complete set of results can be found in Results Review.       Medications - No data to display    Assessments & Plan (with Medical Decision Making)     76 year old male being treated for right proximal forearm cellulitis with Cephalexin. Started on 4/13 in clinic and has taken 7 doses so far. No fevers. Presents here for recheck stating the redness has increased.     Exam as noted above, see picture. Hard Palpable lump and erythema/echymosis.  Does not feel fluctuant to suspect abscess. I suspected thrombophlebitis. US obtained and reveals no evidence of DVT or superficial thrombus. The area of the lump is indeterminate hypoechoic lesion.     No leukocytosis.  CRP 14.3  INR 3.12   Glucose 294     The area appears like a ecchymosis/contusion but patient does not recall injury. Patient has not completed 48 hours of antibiotics so I am less inclined consider this failure on his oral antibiotics. I recommend continuing with Cephalexin. He has appt with his PCP in clinic on Tuesday.    Plan:  Continue Cephalexin as prescribed.  Warm compresses 3/x a day.  Recheck in clinic on Tuesday as scheduled.  Return to the emergency department for fevers, increased redness, swelling, or any new symptoms of concern.      New Prescriptions    No medications on file       Final diagnoses:   Right arm cellulitis       4/15/2022   [facility] EMERGENCY DEPT     Praveena Segura APRN CNP  04/15/22 3685

## 2022-04-15 NOTE — PROGRESS NOTES
ANTICOAGULATION  MANAGEMENT: Discharge Review    Antoine JESSICA Russo chart reviewed for anticoagulation continuity of care    Hospital Admission on 4/15/22 for Cellulitis.    Discharge disposition: Home    Results:    Recent labs: (last 7 days)     04/15/22  1126   INR 3.12*     Anticoagulation inpatient management:     not applicable     Anticoagulation discharge instructions:     Warfarin dosing: home regimen continued   Bridging: No   INR goal change: No      Medication changes affecting anticoagulation: Yes: Keflex    Additional factors affecting anticoagulation: Yes: Possible Cellulitis.    Plan     No adjustment to anticoagulation plan needed    Recommended follow up is scheduled  Patient not contacted    No adjustment to Anticoagulation Calendar was required    Angela Harrell RN

## 2022-04-15 NOTE — DISCHARGE INSTRUCTIONS
Continue Cephalexin as prescribed.  Warm compresses 3/x a day.  Recheck in clinic on Tuesday as scheduled.  Return to the emergency department for fevers, increased redness, swelling, or any new symptoms of concern.

## 2022-04-19 ENCOUNTER — OFFICE VISIT (OUTPATIENT)
Dept: FAMILY MEDICINE | Facility: CLINIC | Age: 77
End: 2022-04-19
Payer: COMMERCIAL

## 2022-04-19 VITALS
SYSTOLIC BLOOD PRESSURE: 139 MMHG | RESPIRATION RATE: 20 BRPM | OXYGEN SATURATION: 95 % | HEART RATE: 85 BPM | BODY MASS INDEX: 31.57 KG/M2 | WEIGHT: 220 LBS | TEMPERATURE: 97 F | DIASTOLIC BLOOD PRESSURE: 80 MMHG

## 2022-04-19 DIAGNOSIS — Z79.01 LONG TERM CURRENT USE OF ANTICOAGULANT THERAPY: ICD-10-CM

## 2022-04-19 DIAGNOSIS — G62.9 PERIPHERAL POLYNEUROPATHY: ICD-10-CM

## 2022-04-19 DIAGNOSIS — Z79.899 ENCOUNTER FOR LONG-TERM (CURRENT) USE OF MEDICATIONS: ICD-10-CM

## 2022-04-19 DIAGNOSIS — I10 HYPERTENSION, BENIGN ESSENTIAL, GOAL BELOW 140/90: ICD-10-CM

## 2022-04-19 DIAGNOSIS — E78.5 HYPERLIPIDEMIA LDL GOAL <100: ICD-10-CM

## 2022-04-19 DIAGNOSIS — I73.9 PVD (PERIPHERAL VASCULAR DISEASE) (H): ICD-10-CM

## 2022-04-19 DIAGNOSIS — E11.40 TYPE 2 DIABETES MELLITUS WITH DIABETIC NEUROPATHY, WITH LONG-TERM CURRENT USE OF INSULIN (H): Primary | ICD-10-CM

## 2022-04-19 DIAGNOSIS — Z79.4 TYPE 2 DIABETES MELLITUS WITH DIABETIC NEUROPATHY, WITH LONG-TERM CURRENT USE OF INSULIN (H): Primary | ICD-10-CM

## 2022-04-19 DIAGNOSIS — I48.20 CHRONIC ATRIAL FIBRILLATION (H): ICD-10-CM

## 2022-04-19 DIAGNOSIS — R09.89 DECREASED PULSES IN FEET: ICD-10-CM

## 2022-04-19 LAB
BASOPHILS # BLD AUTO: 0 10E3/UL (ref 0–0.2)
BASOPHILS NFR BLD AUTO: 1 %
EOSINOPHIL # BLD AUTO: 0.2 10E3/UL (ref 0–0.7)
EOSINOPHIL NFR BLD AUTO: 3 %
ERYTHROCYTE [DISTWIDTH] IN BLOOD BY AUTOMATED COUNT: 16.8 % (ref 10–15)
HBA1C MFR BLD: 9.1 % (ref 0–5.6)
HCT VFR BLD AUTO: 49.5 % (ref 40–53)
HGB BLD-MCNC: 15.8 G/DL (ref 13.3–17.7)
HOLD SPECIMEN: NORMAL
HOLD SPECIMEN: NORMAL
LYMPHOCYTES # BLD AUTO: 2 10E3/UL (ref 0.8–5.3)
LYMPHOCYTES NFR BLD AUTO: 25 %
MCH RBC QN AUTO: 33.3 PG (ref 26.5–33)
MCHC RBC AUTO-ENTMCNC: 31.9 G/DL (ref 31.5–36.5)
MCV RBC AUTO: 104 FL (ref 78–100)
MONOCYTES # BLD AUTO: 0.9 10E3/UL (ref 0–1.3)
MONOCYTES NFR BLD AUTO: 11 %
NEUTROPHILS # BLD AUTO: 5 10E3/UL (ref 1.6–8.3)
NEUTROPHILS NFR BLD AUTO: 62 %
PLATELET # BLD AUTO: 182 10E3/UL (ref 150–450)
RBC # BLD AUTO: 4.74 10E6/UL (ref 4.4–5.9)
WBC # BLD AUTO: 8.1 10E3/UL (ref 4–11)

## 2022-04-19 PROCEDURE — 83036 HEMOGLOBIN GLYCOSYLATED A1C: CPT | Performed by: FAMILY MEDICINE

## 2022-04-19 PROCEDURE — 85025 COMPLETE CBC W/AUTO DIFF WBC: CPT | Performed by: FAMILY MEDICINE

## 2022-04-19 PROCEDURE — 36415 COLL VENOUS BLD VENIPUNCTURE: CPT | Performed by: FAMILY MEDICINE

## 2022-04-19 PROCEDURE — 99214 OFFICE O/P EST MOD 30 MIN: CPT | Performed by: FAMILY MEDICINE

## 2022-04-19 PROCEDURE — 99207 PR FOOT EXAM NO CHARGE: CPT | Performed by: FAMILY MEDICINE

## 2022-04-19 RX ORDER — HUMAN INSULIN 100 [IU]/ML
INJECTION, SUSPENSION SUBCUTANEOUS
Qty: 30 ML | Refills: 5 | Status: SHIPPED | OUTPATIENT
Start: 2022-04-19 | End: 2023-11-14 | Stop reason: ALTCHOICE

## 2022-04-19 RX ORDER — ONDANSETRON 2 MG/ML
4 INJECTION INTRAMUSCULAR; INTRAVENOUS EVERY 6 HOURS PRN
Status: CANCELLED
Start: 2022-04-19

## 2022-04-19 ASSESSMENT — PAIN SCALES - GENERAL: PAINLEVEL: NO PAIN (0)

## 2022-04-19 NOTE — NURSING NOTE
"Chief Complaint   Patient presents with     ER F/U     Diabetes       Initial /80 (BP Location: Left arm)   Pulse 85   Temp 97  F (36.1  C) (Tympanic)   Resp 20   Wt 99.8 kg (220 lb)   SpO2 95%   BMI 31.57 kg/m   Estimated body mass index is 31.57 kg/m  as calculated from the following:    Height as of 4/15/22: 1.778 m (5' 10\").    Weight as of this encounter: 99.8 kg (220 lb).    Patient presents to the clinic using No DME    Health Maintenance that is potentially due pending provider review:  NONE    n/a    Is there anyone who you would like to be able to receive your results? No  If yes have patient fill out VIDYA    "

## 2022-05-04 ENCOUNTER — ANTICOAGULATION THERAPY VISIT (OUTPATIENT)
Dept: ANTICOAGULATION | Facility: CLINIC | Age: 77
End: 2022-05-04

## 2022-05-04 ENCOUNTER — TELEPHONE (OUTPATIENT)
Dept: ANTICOAGULATION | Facility: CLINIC | Age: 77
End: 2022-05-04

## 2022-05-04 ENCOUNTER — LAB (OUTPATIENT)
Dept: LAB | Facility: CLINIC | Age: 77
End: 2022-05-04
Payer: COMMERCIAL

## 2022-05-04 DIAGNOSIS — I48.20 CHRONIC ATRIAL FIBRILLATION (H): Primary | ICD-10-CM

## 2022-05-04 DIAGNOSIS — I48.20 CHRONIC ATRIAL FIBRILLATION (H): ICD-10-CM

## 2022-05-04 DIAGNOSIS — Z79.01 LONG TERM CURRENT USE OF ANTICOAGULANT THERAPY: ICD-10-CM

## 2022-05-04 DIAGNOSIS — I48.20 CHRONIC ATRIAL FIBRILLATION (H): Chronic | ICD-10-CM

## 2022-05-04 DIAGNOSIS — Z79.01 LONG TERM CURRENT USE OF ANTICOAGULANT THERAPY: Chronic | ICD-10-CM

## 2022-05-04 LAB — INR BLD: 1.9 (ref 0.9–1.1)

## 2022-05-04 PROCEDURE — 36416 COLLJ CAPILLARY BLOOD SPEC: CPT

## 2022-05-04 PROCEDURE — 85610 PROTHROMBIN TIME: CPT

## 2022-05-04 RX ORDER — WARFARIN SODIUM 2.5 MG/1
TABLET ORAL
Qty: 70 TABLET | Refills: 3 | COMMUNITY
Start: 2022-05-04 | End: 2022-07-26

## 2022-05-04 NOTE — TELEPHONE ENCOUNTER
ANTICOAGULATION MANAGEMENT      Antoine Russo due for annual renewal of referral to anticoagulation monitoring. Order pended for your review and signature.      ANTICOAGULATION SUMMARY      Warfarin indication(s)     Atrial fibrillation    Heart valve present?  NO       Current goal range   INR: 2.0-3.0     Goal appropriate for indication? Yes, INR 2-3 appropriate for hx of DVT, PE, hypercoagulable state, Afib, LVAD, or bileaflet AVR without risk factors     Current duration of therapy Indefinite/long term therapy   Time in Therapeutic Range (TTR)  (Goal > 60%) 100%      Office visit with referring provider's group within last year yes on 4/19/22       Angela Harrell RN

## 2022-05-04 NOTE — PROGRESS NOTES
ANTICOAGULATION MANAGEMENT     Antoine Russo 76 year old male is on warfarin with subtherapeutic INR result. (Goal INR 2.0-3.0)    Recent labs: (last 7 days)     05/04/22  0650   INR 1.9*       ASSESSMENT       Source(s): Patient/Caregiver Call       Warfarin doses taken: Warfarin taken differently, but did not change total weekly dose    Diet: No new diet changes identified    New illness, injury, or hospitalization: No    Medication/supplement changes: None noted    Signs or symptoms of bleeding or clotting: No    Previous INR: Supratherapeutic    Additional findings: None       PLAN     Recommended plan for temporary change(s) affecting INR     Dosing Instructions: booster dose then continue your current warfarin dose with next INR in 2 weeks       Summary  As of 5/4/2022    Full warfarin instructions:  5/4: 2.5 mg; Otherwise 2.5 mg every Tue, Thu, Sat; 1.25 mg all other days   Next INR check:  5/18/2022             Telephone call with Poli or Saleem who verbalizes understanding and agrees to plan    Lab visit scheduled    Education provided: Please call back if any changes to your diet, medications or how you've been taking warfarin, Monitoring for clotting signs and symptoms, When to seek medical attention/emergency care and Contact [phone]  with any changes, questions or concerns.     Plan made per [facility] anticoagulation protocol    Angela Harrell RN  Anticoagulation Clinic  5/4/2022    _______________________________________________________________________     Anticoagulation Episode Summary     Current INR goal:  2.0-3.0   TTR:  98.0 % (1 y)   Target end date:  Indefinite   Send INR reminders to:  [facility]    Indications    Chronic atrial fibrillation (H) [I48.20]  Long term current use of anticoagulant therapy [Z79.01]           Comments:           Anticoagulation Care Providers     Provider Role Specialty Phone number    Katie Martino MD Referring Family Medicine [phone]

## 2022-05-07 ENCOUNTER — OFFICE VISIT (OUTPATIENT)
Dept: URGENT CARE | Facility: URGENT CARE | Age: 77
End: 2022-05-07
Payer: COMMERCIAL

## 2022-05-07 ENCOUNTER — ANCILLARY PROCEDURE (OUTPATIENT)
Dept: GENERAL RADIOLOGY | Facility: CLINIC | Age: 77
End: 2022-05-07
Attending: NURSE PRACTITIONER
Payer: COMMERCIAL

## 2022-05-07 VITALS
OXYGEN SATURATION: 93 % | WEIGHT: 220 LBS | BODY MASS INDEX: 31.57 KG/M2 | RESPIRATION RATE: 20 BRPM | SYSTOLIC BLOOD PRESSURE: 124 MMHG | HEART RATE: 89 BPM | TEMPERATURE: 97.7 F | DIASTOLIC BLOOD PRESSURE: 76 MMHG

## 2022-05-07 DIAGNOSIS — S96.912A STRAIN OF LEFT ANKLE, INITIAL ENCOUNTER: Primary | ICD-10-CM

## 2022-05-07 DIAGNOSIS — S96.912A STRAIN OF LEFT ANKLE, INITIAL ENCOUNTER: ICD-10-CM

## 2022-05-07 PROCEDURE — 99214 OFFICE O/P EST MOD 30 MIN: CPT | Performed by: NURSE PRACTITIONER

## 2022-05-07 PROCEDURE — 73610 X-RAY EXAM OF ANKLE: CPT | Mod: TC | Performed by: RADIOLOGY

## 2022-05-07 NOTE — PROGRESS NOTES
SUBJECTIVE:  Antoine Russo is a 76 year old male who sustained a left foot pain 1 weeks ago.   Mechanism of injury: none .   Symptoms have been gradual since that time.   Prior history of related problems: no prior problems with this area in the past.    Past Medical History:   Diagnosis Date     Acute kidney injury (H) 04/17/2019     Acute on chronic pancreatitis (H) 01/23/2018     Bacteriuria with pyuria 04/17/2019     C. difficile colitis      Chronic atrial fibrillation (H)     On chronic anticoagulation with coumadin     Colon polyp 08/26/2011    Colonoscopy 8/2011-A sessile polyp was found in the cecum. The polyp was 6 mm in size. The polyp was removed with a hot snare. Resection and retrieval were complete. A sessile polyp was found in the proximal transverse colon. The polyp was 15 mm in size. The polyp was removed with a hot snare. Resection and retrieval were complete. A sessile polyp was found in the sigmoid colon. 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, N0, M0 carcinoma, underwent transcervical glossectomy, floor of mouth excision, BL neck dissection, adj radiation, and skin flap reconstruction     Recurrent pancreatitis     alcoholic pancreatitis      Past Surgical History:   Procedure Laterality Date     BREAST SURGERY  01/01/2008    right breast mass benign     COLECTOMY SUBTOTAL  2013    With diverting ostomy creation; done for toxic C difficile colitis     COLONOSCOPY      multiple polyps removed     COLONOSCOPY  08/24/2011    TUMOR/POLYP/LESION BY SNARE     COLONOSCOPY  12/17/2012    COLONOSCOPY     COLONOSCOPY  12/18/2012    COLONOSCOPY     DENERVATION OF SPERMATIC CORD MICROSURGICAL Left 05/23/2017    Procedure: DENERVATION OF SPERMATIC CORD MICROSURGICAL;;  Surgeon: Marcio Aggarwal MD;  [facility]: [facility]     DISSECTION RADICAL NECK BILATERAL  08/02/2012    Procedure: DISSECTION  RADICAL NECK BILATERAL;;  Surgeon: Yung Alvares MD;  Location: [facility]     ENDOSCOPIC RETROGRADE CHOLANGIOPANCREATOGRAM N/A 05/10/2016    Procedure: COMBINED ENDOSCOPIC RETROGRADE CHOLANGIOPANCREATOGRAPHY, PLACE TUBE/STENT;  Surgeon: Yovanny Beasley MD;  Location: [facility]     ENDOSCOPIC RETROGRADE CHOLANGIOPANCREATOGRAM N/A 03/29/2018    Procedure: ENDOSCOPIC RETROGRADE CHOLANGIOPANCREATOGRAM;  Endoscopic Retrograde Cholangiopancreatogram, Endoscopic Ultrasound, Biliary Sphincterotomy, Biliary and Pancreatic Stent Placement;  Surgeon: Yovanny Beasley MD;  Location: [facility]     ENDOSCOPIC ULTRASOUND UPPER GASTROINTESTINAL TRACT (GI) N/A 02/03/2016    Procedure: ENDOSCOPIC ULTRASOUND, ESOPHAGOSCOPY / UPPER GASTROINTESTINAL TRACT (GI);  Surgeon: Grabiel Plata MD;  [facility]: [facility]     ENDOSCOPIC ULTRASOUND UPPER GASTROINTESTINAL TRACT (GI) N/A 03/29/2018    Procedure: ENDOSCOPIC ULTRASOUND, ESOPHAGOSCOPY / UPPER GASTROINTESTINAL TRACT (GI);;  Surgeon: Grabiel Plata MD;  [facility]: [facility]     ESOPHAGOSCOPY, GASTROSCOPY, DUODENOSCOPY (EGD), COMBINED N/A 02/03/2016    Procedure: COMBINED ENDOSCOPIC ULTRASOUND, ESOPHAGOSCOPY, GASTROSCOPY, DUODENOSCOPY (EGD), FINE NEEDLE ASPIRATE/BIOPSY;  Surgeon: Grabiel Plata MD;  [facility]     ESOPHAGOSCOPY, GASTROSCOPY, DUODENOSCOPY (EGD), COMBINED N/A 06/08/2016    Procedure: COMBINED ESOPHAGOSCOPY, GASTROSCOPY, DUODENOSCOPY (EGD), REMOVE FOREIGN BODY;  Surgeon: Yovanny Beasley MD;  [facility]     ESOPHAGOSCOPY, GASTROSCOPY, DUODENOSCOPY (EGD), COMBINED N/A 04/17/2018    Procedure: COMBINED ESOPHAGOSCOPY, GASTROSCOPY, DUODENOSCOPY (EGD), REMOVE FOREIGN BODY;  EGD with stent removal;  Surgeon: Grabiel Plata MD;  [facility]: [facility]     EXCISE LESION INTRAORAL  06/14/2012    Procedure: EXCISE LESION INTRAORAL;  Wide Local Excision Floor of Mouth, Direct Laryngoscopy, Bilateral [place]'s Marsuplization, Split Thickness Skin Graft  from right Thigh  Latex Safe;  Surgeon: Gerson Ravi MD;  [facility]     EXCISE LESION INTRAORAL  08/02/2012    Procedure: EXCISE LESION INTRAORAL;  Floor of Mouth Resection, Bilateral Selective Radical Neck Dissection, Tracheostomy, Left Radial Forearm  Free Flap with Alloderm, Nasogastric Feeding Tube Placement,    * Latex Safe*;  Surgeon: Gerson Ravi MD;  [facility]     EXCISE LESION INTRAORAL  12/11/2012    Procedure: EXCISE LESION INTRAORAL;  takedown of oral flap;  Surgeon: Yung Alvares MD;  [facility]     GRAFT FREE VASCULARIZED (LOCATION)  08/02/2012    Procedure: GRAFT FREE VASCULARIZED (LOCATION);;  Surgeon: Yung Alvarse MD;  [facility]     GRAFT SKIN SPLIT THICKNESS FROM EXTREMITY  06/14/2012    Procedure: GRAFT SKIN SPLIT THICKNESS FROM EXTREMITY;;  Surgeon: Gerson Ravi MD;  [facility]     LAPAROSCOPIC ILEOSTOMY TAKEDOWN  06/06/2013    LAPAROSCOPIC ILEOSTOMY TAKEDOWN     LAPAROTOMY EXPLORATORY  12/20/2012    LAPAROTOMY EXPLORATORY with Colectomy     LARYNGOSCOPY  06/14/2012    Procedure: LARYNGOSCOPY;;  Surgeon: Gerson Ravi MD;  [facility]: [facility]     ORTHOPEDIC SURGERY      ganglian cyst left ankle     PANCREATECTOMY, SPLENECTOMY N/A 03/10/2016    Procedure: PANCREATECTOMY, SPLENECTOMY;  Surgeon: Nael Abel MD;  Location: [facility]     SHOULDER SURGERY  2006, 2008    2006- right rotator cuff, 2008 bone spur on left. Dr. Hdez     VARICOCELECTOMY Left 05/23/2017    Procedure: VARICOCELECTOMY;  Left Varicocele Repair, Denervation of Left Testis;  Surgeon: Marcio Aggarwal MD;  Location:  OR      Social History     Tobacco Use     Smoking status: Former Smoker     Packs/day: 1.00     Years: 40.00     Pack years: 40.00     Types: Cigarettes     Quit date: 11/24/2006     Years since quitting: 15.4     Smokeless tobacco: Never Used   Vaping Use     Vaping Use: Never used   Substance Use Topics     Alcohol use: Yes     Drug use: Never        ANKLE  Inspection/Palpation:     Swelling: no swelling      Non-tender: ATFL, CFL, PTFL, medial malleolus, deltoid ligament, anterior tib-fib ligament, achilles  tendon, no achilles tendon defect   Range of Motion: dorsiflexion: full, plantarflexion: full, inversion: full, eversion: full  Strength:dorsiflexion: 5/5, plantarflexion: 5/5, inversion: 5/5, eversion: 5/5   Special tests: negative anterior drawer, negative varus stress, negative valgus stress, negative forced external rotation, negative Singh sign     FOOT  Inspection/Palpation:      Swelling:lateral aspects    Non-tender: promixal 5th metatarsal, 1st, 2nd, 3rd, 4th, 5th metatarsals, calcaneous, cuboid,  navicular, cuneiform lateral, cuneiform middle, cuneiform medial, metatarsal heads, peroneal tendon: at lateral malleolus, at cuboid, at proximal 5th metatarsal, posterior tibial tendon at medial malleolus, posterior tibial tendon at navicular, plantar fascia  Range of Motion: flexion of toes: full, extension of toes: full    Constitutional, HEENT, cardiovascular, pulmonary, gi and gu systems are negative, except as otherwise noted.    OBJECTIVE:  Blood pressure 124/76, pulse 89, temperature 97.7  F (36.5  C), temperature source Tympanic, resp. rate 20, weight 99.8 kg (220 lb), SpO2 93 %.  Appearance: in no apparent distress and well developed and well nourished.  Foot/ankle exam: soft tissue swelling and tenderness over the lateral malleolus, reduced range of motion of ankle   GENERAL: no acute distress  RESP: lungs clear to auscultation - no rales, rhonchi or wheezes  CV: regular rates and rhythm, normal      X-ray: X-ray reviewed and interpreted by myself in office no acute findings will await final radiology report    Results for orders placed or performed in visit on 05/07/22   XR Ankle Left G/E 3 Views     Status: None    Narrative    EXAM: XR ANKLE LEFT G/E 3 VIEWS  LOCATION: [facility]  DATE/TIME: 5/7/2022 12:14  PM    INDICATION:  Strain of left ankle, initial encounter  COMPARISON: None.      Impression    IMPRESSION: No fracture. The ankle mortise is intact. Mild soft tissue swelling along the lateral aspect of the ankle. Small plantar calcaneal spur.       ASSESSMENT:    ICD-10-CM    1. Strain of left ankle, initial encounter  S96.912A XR Ankle Left G/E 3 Views     Ankle/Foot Bracing Supplies Order         PLAN:      FERNANDO Camacho CNP             DME (Durable Medical Equipment) Orders and Documentation  Orders Placed This Encounter   Procedures     Ankle/Foot Bracing Supplies Order      The patient was assessed and it was determined the patient is in need of the following listed DME Supplies/Equipment. Please complete supporting documentation below to demonstrate medical necessity.      Ankle/Foot Bracing Supplies Documentation  Patient requires the use of the ordered bracing device due to following medical need/condition: Ankle strain

## 2022-05-19 ENCOUNTER — LAB (OUTPATIENT)
Dept: LAB | Facility: CLINIC | Age: 77
End: 2022-05-19
Payer: COMMERCIAL

## 2022-05-19 ENCOUNTER — ANTICOAGULATION THERAPY VISIT (OUTPATIENT)
Dept: ANTICOAGULATION | Facility: CLINIC | Age: 77
End: 2022-05-19

## 2022-05-19 DIAGNOSIS — Z79.01 LONG TERM CURRENT USE OF ANTICOAGULANT THERAPY: ICD-10-CM

## 2022-05-19 DIAGNOSIS — I48.20 CHRONIC ATRIAL FIBRILLATION (H): ICD-10-CM

## 2022-05-19 DIAGNOSIS — I48.20 CHRONIC ATRIAL FIBRILLATION (H): Primary | ICD-10-CM

## 2022-05-19 LAB — INR BLD: 2.5 (ref 0.9–1.1)

## 2022-05-19 PROCEDURE — 85610 PROTHROMBIN TIME: CPT

## 2022-05-19 PROCEDURE — 36416 COLLJ CAPILLARY BLOOD SPEC: CPT

## 2022-05-19 NOTE — PROGRESS NOTES
ANTICOAGULATION MANAGEMENT     Antoine Russo 76 year old male is on warfarin with therapeutic INR result. (Goal INR 2.0-3.0)    Recent labs: (last 7 days)     05/19/22  0648   INR 2.5*       ASSESSMENT       Source(s): Chart Review and Patient/Caregiver Call       Warfarin doses taken: Warfarin taken as instructed    Diet: No new diet changes identified    New illness, injury, or hospitalization: Yes: [facility] 5/7 strain of left ankle    Medication/supplement changes: None noted    Signs or symptoms of bleeding or clotting: No    Previous INR: Subtherapeutic    Additional findings: None     PLAN     Recommended plan for temporary change(s) affecting INR     Dosing Instructions: continue your current warfarin dose with next INR in 3 weeks       Summary  As of 5/19/2022    Full warfarin instructions:  2.5 mg every Tue, Thu, Sat; 1.25 mg all other days   Next INR check:  6/9/2022             Telephone call with Poli or Saleem who verbalizes understanding and agrees to plan    Lab visit scheduled    Education provided: Please call back if any changes to your diet, medications or how you've been taking warfarin    Plan made per [facility] anticoagulation protocol    Madhavi Sanders RN  Anticoagulation Clinic  5/19/2022    _______________________________________________________________________     Anticoagulation Episode Summary     Current INR goal:  2.0-3.0   TTR:  97.3 % (1 y)   Target end date:  Indefinite   Send INR reminders to:  [facility]    Indications    Chronic atrial fibrillation (H) [I48.20]  Long term current use of anticoagulant therapy [Z79.01]           Comments:           Anticoagulation Care Providers     Provider Role Specialty Phone number    Katie Martino MD Referring Family Medicine [phone]    Brianna Matute APRN CNP Referring Family Medicine [phone]

## 2022-05-24 ENCOUNTER — HOSPITAL ENCOUNTER (EMERGENCY)
Facility: CLINIC | Age: 77
Discharge: HOME | End: 2022-05-25
Attending: EMERGENCY MEDICINE | Admitting: EMERGENCY MEDICINE
Payer: COMMERCIAL

## 2022-05-24 DIAGNOSIS — M62.81 GENERALIZED MUSCLE WEAKNESS: ICD-10-CM

## 2022-05-24 LAB
ALBUMIN UR-MCNC: >499 MG/DL
ANION GAP SERPL CALCULATED.3IONS-SCNC: 6 MMOL/L (ref 3–14)
APPEARANCE UR: CLEAR
BASOPHILS # BLD MANUAL: 0 10E3/UL (ref 0–0.2)
BASOPHILS NFR BLD MANUAL: 0 %
BILIRUB UR QL STRIP: NEGATIVE
BUN SERPL-MCNC: 14 MG/DL (ref 7–30)
CALCIUM SERPL-MCNC: 8.9 MG/DL (ref 8.5–10.1)
CHLORIDE BLD-SCNC: 103 MMOL/L (ref 94–109)
CO2 SERPL-SCNC: 27 MMOL/L (ref 20–32)
COLOR UR AUTO: YELLOW
CREAT SERPL-MCNC: 0.94 MG/DL (ref 0.66–1.25)
EOSINOPHIL # BLD MANUAL: 0 10E3/UL (ref 0–0.7)
EOSINOPHIL NFR BLD MANUAL: 0 %
ERYTHROCYTE [DISTWIDTH] IN BLOOD BY AUTOMATED COUNT: 14.9 % (ref 10–15)
GFR SERPL CREATININE-BSD FRML MDRD: 85 ML/MIN/1.73M2
GLUCOSE BLD-MCNC: 185 MG/DL (ref 70–99)
GLUCOSE UR STRIP-MCNC: NEGATIVE MG/DL
HCT VFR BLD AUTO: 44.9 % (ref 40–53)
HGB BLD-MCNC: 14.8 G/DL (ref 13.3–17.7)
HGB UR QL STRIP: ABNORMAL
KETONES UR STRIP-MCNC: NEGATIVE MG/DL
LEUKOCYTE ESTERASE UR QL STRIP: NEGATIVE
LYMPHOCYTES # BLD MANUAL: 1.9 10E3/UL (ref 0.8–5.3)
LYMPHOCYTES NFR BLD MANUAL: 21 %
MCH RBC QN AUTO: 32.7 PG (ref 26.5–33)
MCHC RBC AUTO-ENTMCNC: 33 G/DL (ref 31.5–36.5)
MCV RBC AUTO: 99 FL (ref 78–100)
MONOCYTES # BLD MANUAL: 1 10E3/UL (ref 0–1.3)
MONOCYTES NFR BLD MANUAL: 11 %
MUCOUS THREADS #/AREA URNS LPF: PRESENT /LPF
NEUTROPHILS # BLD MANUAL: 6.3 10E3/UL (ref 1.6–8.3)
NEUTROPHILS NFR BLD MANUAL: 68 %
NITRATE UR QL: NEGATIVE
PH UR STRIP: 8 [PH] (ref 5–7)
PLAT MORPH BLD: ABNORMAL
PLATELET # BLD AUTO: 178 10E3/UL (ref 150–450)
POLYCHROMASIA BLD QL SMEAR: SLIGHT
POTASSIUM BLD-SCNC: 4.2 MMOL/L (ref 3.4–5.3)
RBC # BLD AUTO: 4.53 10E6/UL (ref 4.4–5.9)
RBC MORPH BLD: ABNORMAL
RBC URINE: 9 /HPF
SODIUM SERPL-SCNC: 136 MMOL/L (ref 133–144)
SP GR UR STRIP: 1.02 (ref 1–1.03)
SQUAMOUS EPITHELIAL: <1 /HPF
UROBILINOGEN UR STRIP-MCNC: 4 MG/DL
VARIANT LYMPHS BLD QL SMEAR: PRESENT
WBC # BLD AUTO: 9.2 10E3/UL (ref 4–11)
WBC URINE: 1 /HPF

## 2022-05-24 PROCEDURE — 85027 COMPLETE CBC AUTOMATED: CPT | Performed by: EMERGENCY MEDICINE

## 2022-05-24 PROCEDURE — 80048 BASIC METABOLIC PNL TOTAL CA: CPT | Performed by: EMERGENCY MEDICINE

## 2022-05-24 PROCEDURE — 36415 COLL VENOUS BLD VENIPUNCTURE: CPT | Performed by: EMERGENCY MEDICINE

## 2022-05-24 PROCEDURE — 84443 ASSAY THYROID STIM HORMONE: CPT | Performed by: EMERGENCY MEDICINE

## 2022-05-24 PROCEDURE — 81001 URINALYSIS AUTO W/SCOPE: CPT | Performed by: EMERGENCY MEDICINE

## 2022-05-24 PROCEDURE — 84484 ASSAY OF TROPONIN QUANT: CPT | Performed by: EMERGENCY MEDICINE

## 2022-05-24 PROCEDURE — 85007 BL SMEAR W/DIFF WBC COUNT: CPT | Performed by: EMERGENCY MEDICINE

## 2022-05-24 PROCEDURE — 99284 EMERGENCY DEPT VISIT MOD MDM: CPT | Performed by: EMERGENCY MEDICINE

## 2022-05-24 PROCEDURE — 93005 ELECTROCARDIOGRAM TRACING: CPT | Performed by: EMERGENCY MEDICINE

## 2022-05-24 PROCEDURE — 93010 ELECTROCARDIOGRAM REPORT: CPT | Performed by: EMERGENCY MEDICINE

## 2022-05-24 PROCEDURE — 81003 URINALYSIS AUTO W/O SCOPE: CPT | Performed by: EMERGENCY MEDICINE

## 2022-05-24 PROCEDURE — 99284 EMERGENCY DEPT VISIT MOD MDM: CPT | Mod: 25 | Performed by: EMERGENCY MEDICINE

## 2022-05-25 ENCOUNTER — TELEPHONE (OUTPATIENT)
Dept: ANTICOAGULATION | Facility: CLINIC | Age: 77
End: 2022-05-25
Payer: COMMERCIAL

## 2022-05-25 VITALS
HEIGHT: 70 IN | RESPIRATION RATE: 14 BRPM | DIASTOLIC BLOOD PRESSURE: 79 MMHG | HEART RATE: 76 BPM | OXYGEN SATURATION: 93 % | TEMPERATURE: 99.3 F | BODY MASS INDEX: 28.63 KG/M2 | WEIGHT: 200 LBS | SYSTOLIC BLOOD PRESSURE: 156 MMHG

## 2022-05-25 LAB
TROPONIN I SERPL HS-MCNC: 52 NG/L
TSH SERPL DL<=0.005 MIU/L-ACNC: 1.12 MU/L (ref 0.4–4)

## 2022-05-25 NOTE — TELEPHONE ENCOUNTER
ANTICOAGULATION  MANAGEMENT: Discharge Review    Antoine JESSICA Russo chart reviewed for anticoagulation continuity of care    Emergency room visit on 5/25 for Generalized muscle weakness.    Discharge disposition: Home    Results:    Recent labs: (last 7 days)     05/19/22  0648   INR 2.5*     Anticoagulation inpatient management:     not applicable     Anticoagulation discharge instructions:     Warfarin dosing: home regimen continued   Bridging: No   INR goal change: No      Medication changes affecting anticoagulation: No    Additional factors affecting anticoagulation: No    Plan     No adjustment to anticoagulation plan needed    Patient not contacted    No adjustment to Anticoagulation Calendar was required    Madhavi Sanders RN

## 2022-05-25 NOTE — DISCHARGE INSTRUCTIONS
I am not sure what is making you feel so tired but so far all the tests have returned normal and reassuring.  Please return to the emergency department for fevers, chest pain, difficulty breathing, severe headache, worsening symptoms, or other concerns.  Otherwise follow-up in clinic for recheck in the next 3 to 4 days if not improving.

## 2022-05-25 NOTE — ED PROVIDER NOTES
History     Chief Complaint   Patient presents with     Fatigue     HPI  Antoine Russo is a 76 year old male with a history of hypertension and diabetes mellitus who presents for fatigue.  The patient says over the past 12 hours he has been feeling very rundown and sleepy, took a long nap today and this is abnormal for him.  Otherwise he feels fine.  He denies any fever, headache, chest pain, shortness of breath, cough, abdominal pain, nausea, vomiting, diarrhea, bloody stools, dysuria, or rash.  No focal numbness or weakness.    Allergies:  Allergies   Allergen Reactions     Nkda [No Known Drug Allergies]        Problem List:    Patient Active Problem List    Diagnosis Date Noted     Anticipated difficulty with intubation 05/23/2017     Priority: High     Class: Chronic     Difficult two hands mask ventilation, intubated multiple times asleep with video laryngoscope. H/o tongue cancer surgery.        Chronic kidney disease, stage 2 (mild) 01/20/2022     Priority: Medium     Alcohol abuse 02/16/2021     Priority: Medium     Leukocytosis 02/16/2021     Priority: Medium     Nausea with vomiting 02/16/2021     Priority: Medium     Dehydration 02/16/2021     Priority: Medium     Encounter for screening laboratory testing for COVID-19 virus 02/16/2021     Priority: Medium     Acute recurrent pancreatitis 10/17/2020     Priority: Medium     Acute gastritis without hemorrhage, unspecified gastritis type 10/17/2020     Priority: Medium     Added automatically from request for surgery [id number]       Acute gastritis 10/03/2020     Priority: Medium     Peripheral polyneuropathy 05/12/2020     Priority: Medium     Chronic low back pain with sciatica, sciatica laterality unspecified, unspecified back pain laterality 05/12/2020     Priority: Medium     Alcohol dependence, uncomplicated (H) 02/04/2020     Priority: Medium     Acute right-sided low back pain with left-sided sciatica 02/04/2020     Priority: Medium     Spinal  stenosis of lumbar region, unspecified whether neurogenic claudication present 02/04/2020     Priority: Medium     Pancreatic pseudocyst 04/18/2019     Priority: Medium     Acute pancreatitis 10/21/2018     Priority: Medium     Long term current use of anticoagulant therapy 04/05/2018     Priority: Medium     Recurrent pancreatitis 03/30/2018     Priority: Medium     Chronic atrial fibrillation (H) 01/23/2018     Priority: Medium     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 Please refer if Care Coordination services are needed.    Presenting Problem Signs and Symptoms Treatment Plan   Questions or concerns   during clinic hours   I will call my clinic directly:  [address] [address]  [phone].   Questions or concerns outside clinic hours   I will call the 24 hour nurse line at   [phone] or 494[facility].   Need to schedule an appointment   I will call the 24 hour scheduling team at [phone] or my clinic directly at [phone].   Same day treatment     I will call my clinic first, nurse line if after hours, urgent care and express care if needed.   Clinic care coordination services (regular clinic hours)   I will call a clinic care coordinator directly:     Shelia Emanuel RN [facility]  [phone]    Brianna Cristobal SW:    [phone]    Or call my clinic at [phone] and ask to speak with care coordination.   Crisis Services: Behavioral  or Mental Health  [facility] 24 Hour Phone Line  [phone]    [facility] 24 Hour Crisis Services  [phone]    [facility] (Behavioral Health Providers) Network [phone]    [facility]   [phone]     Emergency treatment -- Immediately    [facility] 911                Clostridium difficile enterocolitis 12/18/2012     Priority: Medium     Groin fluid collection 12/15/2012     Priority: Medium     CT 12/12- New (since 5/11) collection measuring at least 2.3 cm in diameter and 6.5 cm in length within the right inguinal canal. 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 Gerson Ravi and aristides at the  in 2012  NAD 2017  CT scan of the neck every 2-3 years.  - Thyroid labs yearly.  - Carotid ultrasound in three to four years to evaluate for stenosis.       Hyperlipidemia LDL goal <100 10/31/2010     Priority: Medium     CAD (coronary artery disease) 05/26/2009     Priority: Medium     Stress testing 2009 showed inferior wall ischemia.  Cath preformed; identified moderate CAD with stenosis of 40-50% in LAD and RCA.  No stents were placed.  Echo in 01/2018 (done while in Afib with rates of 100-110); EF of 55-60%.  No RWMA.       GERD (gastroesophageal reflux disease) 05/26/2009     Priority: Medium     Hypertrophy of breast 09/25/2007     Priority: Medium     PERS HX TOBACCO USE - quit in 11/06 with chantix 03/15/2007     Priority: Medium     Hypertension, benign essential, goal below 140/90 11/07/2005     Priority: Medium     Patient has only fair bp control and with family history of diabetes will all ace if lab  indicated also has bph and may op for psa and not digital exam next yr        Pain in joint, shoulder region 11/07/2005     Priority: Medium     Hypertrophy of prostate without urinary obstruction 11/07/2005     Priority: Medium     Problem list name updated by automated process. Provider to review       Impotence of organic origin 11/07/2005     Priority: Medium        Past Medical History:    Past Medical History:   Diagnosis Date     Acute kidney injury (H) 04/17/2019     Acute on chronic pancreatitis (H) 01/23/2018     Bacteriuria with pyuria 04/17/2019     C. difficile colitis      Chronic atrial fibrillation (H)      Colon polyp 08/26/2011     Diabetes mellitus (H)      Hypertension      Malignant neoplasm (H) 08/2012     Recurrent pancreatitis        Past Surgical History:    Past Surgical History:   Procedure Laterality Date     BREAST SURGERY  01/01/2008    right breast mass benign     COLECTOMY SUBTOTAL  2013    With diverting ostomy creation; done for toxic C difficile colitis     COLONOSCOPY      multiple polyps removed     COLONOSCOPY  08/24/2011    TUMOR/POLYP/LESION BY SNARE     COLONOSCOPY  12/17/2012    COLONOSCOPY     COLONOSCOPY  12/18/2012    COLONOSCOPY     DENERVATION OF SPERMATIC CORD MICROSURGICAL Left 05/23/2017    Procedure: DENERVATION OF SPERMATIC CORD MICROSURGICAL;;  Surgeon: Marcio Aggarwal MD;  [facility]     DISSECTION RADICAL NECK BILATERAL  08/02/2012    Procedure: DISSECTION RADICAL NECK BILATERAL;;  Surgeon: Yung Alvares MD;  [facility]     ENDOSCOPIC RETROGRADE CHOLANGIOPANCREATOGRAM N/A 05/10/2016    Procedure: COMBINED ENDOSCOPIC RETROGRADE CHOLANGIOPANCREATOGRAPHY, PLACE TUBE/STENT;  Surgeon: Yovanny Beasley MD;  [facility]     ENDOSCOPIC RETROGRADE CHOLANGIOPANCREATOGRAM N/A 03/29/2018    Procedure: ENDOSCOPIC RETROGRADE CHOLANGIOPANCREATOGRAM;  Endoscopic Retrograde Cholangiopancreatogram, Endoscopic Ultrasound, Biliary Sphincterotomy,  Biliary and Pancreatic Stent Placement;  Surgeon: Yovanny Beasley MD;  [facility]     ENDOSCOPIC ULTRASOUND UPPER GASTROINTESTINAL TRACT (GI) N/A 02/03/2016    Procedure: ENDOSCOPIC ULTRASOUND, ESOPHAGOSCOPY / UPPER GASTROINTESTINAL TRACT (GI);  Surgeon: Grabiel Plata MD;  [facility]     ENDOSCOPIC ULTRASOUND UPPER GASTROINTESTINAL TRACT (GI) N/A 03/29/2018    Procedure: ENDOSCOPIC ULTRASOUND, ESOPHAGOSCOPY / UPPER GASTROINTESTINAL TRACT (GI);;  Surgeon: Grabiel Plata MD;  [facility]     ESOPHAGOSCOPY, GASTROSCOPY, DUODENOSCOPY (EGD), COMBINED N/A 02/03/2016    Procedure: COMBINED ENDOSCOPIC ULTRASOUND, ESOPHAGOSCOPY, GASTROSCOPY, DUODENOSCOPY (EGD), FINE NEEDLE ASPIRATE/BIOPSY;  Surgeon: Grabiel Plata MD;  [facility]     ESOPHAGOSCOPY, GASTROSCOPY, DUODENOSCOPY (EGD), COMBINED N/A 06/08/2016    Procedure: COMBINED ESOPHAGOSCOPY, GASTROSCOPY, DUODENOSCOPY (EGD), REMOVE FOREIGN BODY;  Surgeon: Yovanny Beasley MD;  [facility]     ESOPHAGOSCOPY, GASTROSCOPY, DUODENOSCOPY (EGD), COMBINED N/A 04/17/2018    Procedure: COMBINED ESOPHAGOSCOPY, GASTROSCOPY, DUODENOSCOPY (EGD), REMOVE FOREIGN BODY;  EGD with stent removal;  Surgeon: Grabiel Plata MD;  [facility]     EXCISE LESION INTRAORAL  06/14/2012    Procedure: EXCISE LESION INTRAORAL;  Wide Local Excision Floor of Mouth, Direct Laryngoscopy, Bilateral Ida's Marsuplization, Split Thickness Skin Graft from right Thigh  Latex Safe;  Surgeon: Gerson Ravi MD;  [facility]     EXCISE LESION INTRAORAL  08/02/2012    Procedure: EXCISE LESION INTRAORAL;  Floor of Mouth Resection, Bilateral Selective Radical Neck Dissection, Tracheostomy, Left Radial Forearm  Free Flap with Alloderm, Nasogastric Feeding Tube Placement,    * Latex Safe*;  Surgeon: Gerson Ravi MD;  [facility]     EXCISE LESION INTRAORAL  12/11/2012    Procedure: EXCISE LESION INTRAORAL;  takedown of oral flap;  Surgeon:  Yung Alvares MD;  [facility]     GRAFT FREE VASCULARIZED (LOCATION)  2012    Procedure: GRAFT FREE VASCULARIZED (LOCATION);;  Surgeon: Yung Alvares MD;  [facility]     GRAFT SKIN SPLIT THICKNESS FROM EXTREMITY  2012    Procedure: GRAFT SKIN SPLIT THICKNESS FROM EXTREMITY;;  Surgeon: Gerson Ravi MD;  [facility]     LAPAROSCOPIC ILEOSTOMY TAKEDOWN  2013    LAPAROSCOPIC ILEOSTOMY TAKEDOWN     LAPAROTOMY EXPLORATORY  2012    LAPAROTOMY EXPLORATORY with Colectomy     LARYNGOSCOPY  2012    Procedure: LARYNGOSCOPY;;  Surgeon: Gerson Ravi MD;  [facility]     ORTHOPEDIC SURGERY      ganglian cyst left ankle     PANCREATECTOMY, SPLENECTOMY N/A 03/10/2016    Procedure: PANCREATECTOMY, SPLENECTOMY;  Surgeon: Nael Abel MD;  [facility]     SHOULDER SURGERY  ,     2006- right rotator cuff,  bone spur on left. Dr. Hdez     VARICOCELECTOMY Left 2017    Procedure: VARICOCELECTOMY;  Left Varicocele Repair, Denervation of Left Testis;  Surgeon: Marcio Aggarwal MD;  [facility]       Family History:    Family History   Problem Relation Age of Onset     Diabetes Sister         onset age 50     Alzheimer Disease Mother          80     Alzheimer Disease Father          85     Diabetes Other         nephew type 1     Diabetes Other      Aneurysm Sister      Anesthesia Reaction No family hx of      Colon Cancer No family hx of      Colon Polyps No family hx of      Crohn's Disease No family hx of      Ulcerative Colitis No family hx of        Social History:  Marital Status:   [2]  Social History     Tobacco Use     Smoking status: Former Smoker     Packs/day: 1.00     Years: 40.00     Pack years: 40.00     Types: Cigarettes     Quit date: 2006     Years since quitting: 15.5     Smokeless tobacco: Never Used   Vaping Use     Vaping Use: Never used   Substance Use Topics     Alcohol use: Yes     Drug use: Never     "    Medications:    Continuous Blood Gluc Sensor (FREESTYLE LISA 14 DAY SENSOR) MISC  insulin NPH (NOVOLIN N FLEXPEN RELION) 100 UNIT/ML injection  insulin pen needle (BD LIUS U/F) 32G X 4 MM miscellaneous  lisinopril (ZESTRIL) 10 MG tablet  metoprolol succinate ER (TOPROL-XL) 50 MG 24 hr tablet  multivitamin, therapeutic with minerals (THERA-VIT-M) TABS  pregabalin (LYRICA) 150 MG capsule  rosuvastatin (CRESTOR) 10 MG tablet  tamsulosin (FLOMAX) 0.4 MG capsule  vitamin B-12 (CYANOCOBALAMIN) 100 MCG tablet  Vitamin D3 (CHOLECALCIFEROL) 25 mcg (1000 units) tablet  warfarin ANTICOAGULANT (COUMADIN) 2.5 MG tablet          Review of Systems  Pertinent positives and negatives listed in the HPI, all other systems reviewed and are negative.    Physical Exam   BP: (!) 155/77  Pulse: 94  Temp: 99.3  F (37.4  C)  Resp: 18  Height: 177.8 cm (5' 10\")  Weight: 90.7 kg (200 lb)  SpO2: 92 %      Physical Exam  Vitals and nursing note reviewed.   Constitutional:       General: He is in acute distress.      Appearance: He is well-developed. He is not diaphoretic.   HENT:      Head: Normocephalic and atraumatic.      Right Ear: External ear normal.      Left Ear: External ear normal.      Nose: Nose normal.   Eyes:      General: No scleral icterus.     Conjunctiva/sclera: Conjunctivae normal.   Cardiovascular:      Rate and Rhythm: Normal rate and regular rhythm.   Pulmonary:      Effort: Pulmonary effort is normal. No respiratory distress.      Breath sounds: No stridor.   Abdominal:      General: There is no distension.      Palpations: Abdomen is soft.      Tenderness: There is no abdominal tenderness. There is no guarding.   Musculoskeletal:      Cervical back: Normal range of motion.   Skin:     General: Skin is warm and dry.   Neurological:      Mental Status: He is alert and oriented to person, place, and time.      GCS: GCS eye subscore is 4. GCS verbal subscore is 5. GCS motor subscore is 6.      Cranial Nerves: Cranial " nerves are intact.      Motor: No abnormal muscle tone or pronator drift.      Coordination: Finger-Nose-Finger Test normal. Rapid alternating movements normal.   Psychiatric:         Behavior: Behavior normal.         ED Course                 Procedures              EKG Interpretation:      Interpreted by Michael Turcios MD  Time reviewed: 0026  Symptoms at time of EKG: Fatigue   Rhythm: normal sinus   Rate: 80  Axis: Left Axis Deviation  Ectopy: none  Conduction: left anterior fasciclar block, first degree AV block  ST Segments/ T Waves: No acute ischemic changes  Q Waves: none  Comparison to prior: Unchanged    Clinical Impression: no acute changes      Critical Care time:  none               Results for orders placed or performed during the hospital encounter of 05/24/22 (from the past 24 hour(s))   UA with Microscopic reflex to Culture    Specimen: Urine, Midstream   Result Value Ref Range    Color Urine Yellow Colorless, Straw, Light Yellow, Yellow    Appearance Urine Clear Clear    Glucose Urine Negative Negative mg/dL    Bilirubin Urine Negative Negative    Ketones Urine Negative Negative mg/dL    Specific Gravity Urine 1.016 1.003 - 1.035    Blood Urine Small (A) Negative    pH Urine 8.0 (H) 5.0 - 7.0    Protein Albumin Urine >499 (A) Negative mg/dL    Urobilinogen Urine 4.0 (A) Normal, 2.0 mg/dL    Nitrite Urine Negative Negative    Leukocyte Esterase Urine Negative Negative    Mucus Urine Present (A) None Seen /LPF    RBC Urine 9 (H) <=2 /HPF    WBC Urine 1 <=5 /HPF    Squamous Epithelials Urine <1 <=1 /HPF    Narrative    Urine Culture not indicated   CBC with platelets, differential    Narrative    The following orders were created for panel order CBC with platelets, differential.  Procedure                               Abnormality         Status                     ---------                               -----------         ------                     CBC with platelets and d...[[id number]]   Normal              Final result               Manual Differential[[id number]]          Abnormal            Final result                 Please view results for these tests on the individual orders.   Basic metabolic panel   Result Value Ref Range    Sodium 136 133 - 144 mmol/L    Potassium 4.2 3.4 - 5.3 mmol/L    Chloride 103 94 - 109 mmol/L    Carbon Dioxide (CO2) 27 20 - 32 mmol/L    Anion Gap 6 3 - 14 mmol/L    Urea Nitrogen 14 7 - 30 mg/dL    Creatinine 0.94 0.66 - 1.25 mg/dL    Calcium 8.9 8.5 - 10.1 mg/dL    Glucose 185 (H) 70 - 99 mg/dL    GFR Estimate 85 >60 mL/min/1.73m2   CBC with platelets and differential   Result Value Ref Range    WBC Count 9.2 4.0 - 11.0 10e3/uL    RBC Count 4.53 4.40 - 5.90 10e6/uL    Hemoglobin 14.8 13.3 - 17.7 g/dL    Hematocrit 44.9 40.0 - 53.0 %    MCV 99 78 - 100 fL    MCH 32.7 26.5 - 33.0 pg    MCHC 33.0 31.5 - 36.5 g/dL    RDW 14.9 10.0 - 15.0 %    Platelet Count 178 150 - 450 10e3/uL   Manual Differential   Result Value Ref Range    % Neutrophils 68 %    % Lymphocytes 21 %    % Monocytes 11 %    % Eosinophils 0 %    % Basophils 0 %    Absolute Neutrophils 6.3 1.6 - 8.3 10e3/uL    Absolute Lymphocytes 1.9 0.8 - 5.3 10e3/uL    Absolute Monocytes 1.0 0.0 - 1.3 10e3/uL    Absolute Eosinophils 0.0 0.0 - 0.7 10e3/uL    Absolute Basophils 0.0 0.0 - 0.2 10e3/uL    RBC Morphology Confirmed RBC Indices     Platelet Assessment  Automated Count Confirmed. Platelet morphology is normal.     Automated Count Confirmed. Platelet morphology is normal.    Polychromasia Slight (A) None Seen    Reactive Lymphocytes Present (A) None Seen   Troponin I   Result Value Ref Range    Troponin I High Sensitivity 52 <79 ng/L   TSH with free T4 reflex   Result Value Ref Range    TSH 1.12 0.40 - 4.00 mU/L     *Note: Due to a large number of results and/or encounters for the requested time period, some results have not been displayed. A complete set of results can be found in Results Review.        Medications - No data to display    Assessments & Plan (with Medical Decision Making)   36-year-old male with a history of diabetes and hypertension who presents for fatigue.  Vital signs are relatively reassuring with mild hypertension and normal temperature with a temperature of 37.4 C and heart rate of 94.  SPO2 is 94% on room air.  EKG is sinus rhythm without signs of ischemia.  Electrolytes are within normal limits.  White blood cell count is 9.2 which is reassuring and his hemoglobin is 14.8, no signs of anemia.  Urinalysis is negative for signs of infection.  He has a normal neurologic exam here, no signs of focal neurologic deficit.  No indication for imaging of the head at this time.  Troponin is normal, no signs of ACS.  TSH is normal, no signs of thyroid disease.  Unclear cause of his symptoms but at this time he is well-appearing here, active and alert, normal neurologic exam, reassuring vital signs and work-up and he is safe to discharge home with instructions to return if he has fevers, headache, chest pain, difficulty breathing, worsening symptoms, or other concerns.  Otherwise follow-up in clinic.  The patient and his wife are in agreement with this plan.    I have reviewed the nursing notes.    I have reviewed the findings, diagnosis, plan and need for follow up with the patient.       Discharge Medication List as of 5/25/2022  1:17 AM          Final diagnoses:   Generalized muscle weakness       5/24/2022   [facility] EMERGENCY DEPT     Michael Turcios MD  05/25/22 0411

## 2022-05-25 NOTE — ED TRIAGE NOTES
Pt presents with new onset of fatigue that was noticed around 11 today. Denies new infections, changes in medications, or other sx.      Triage Assessment     Row Name 05/24/22 2034       Triage Assessment (Adult)    Airway WDL WDL       Respiratory WDL    Respiratory WDL WDL       Skin Circulation/Temperature WDL    Skin Circulation/Temperature WDL WDL       Cardiac WDL    Cardiac WDL WDL       Peripheral/Neurovascular WDL    Peripheral Neurovascular WDL WDL       Cognitive/Neuro/Behavioral WDL    Cognitive/Neuro/Behavioral WDL WDL

## 2022-06-08 ENCOUNTER — ANTICOAGULATION THERAPY VISIT (OUTPATIENT)
Dept: ANTICOAGULATION | Facility: CLINIC | Age: 77
End: 2022-06-08

## 2022-06-08 ENCOUNTER — LAB (OUTPATIENT)
Dept: LAB | Facility: CLINIC | Age: 77
End: 2022-06-08
Payer: COMMERCIAL

## 2022-06-08 ENCOUNTER — TELEPHONE (OUTPATIENT)
Dept: ANTICOAGULATION | Facility: CLINIC | Age: 77
End: 2022-06-08

## 2022-06-08 DIAGNOSIS — I48.20 CHRONIC ATRIAL FIBRILLATION (H): ICD-10-CM

## 2022-06-08 DIAGNOSIS — I48.20 CHRONIC ATRIAL FIBRILLATION (H): Primary | ICD-10-CM

## 2022-06-08 DIAGNOSIS — Z79.01 LONG TERM CURRENT USE OF ANTICOAGULANT THERAPY: ICD-10-CM

## 2022-06-08 LAB — INR BLD: 2.2 (ref 0.9–1.1)

## 2022-06-08 PROCEDURE — 85610 PROTHROMBIN TIME: CPT

## 2022-06-08 PROCEDURE — 36416 COLLJ CAPILLARY BLOOD SPEC: CPT

## 2022-06-08 NOTE — PROGRESS NOTES
ANTICOAGULATION MANAGEMENT     Antoine Russo 76 year old male is on warfarin with therapeutic INR result. (Goal INR 2.0-3.0)    Recent labs: (last 7 days)     06/08/22  0649   INR 2.2*       ASSESSMENT       Source(s): Chart Review and Patient/Caregiver Call       Warfarin doses taken: Warfarin taken as instructed    Diet: No new diet changes identified    New illness, injury, or hospitalization: No    Medication/supplement changes: None noted    Signs or symptoms of bleeding or clotting: No    Previous INR: Therapeutic last visit; previously outside of goal range    Additional findings: None     PLAN     Recommended plan for no diet, medication or health factor changes affecting INR     Dosing Instructions: continue your current warfarin dose with next INR in 4 weeks       Summary  As of 6/8/2022    Full warfarin instructions:  2.5 mg every Tue, Thu, Sat; 1.25 mg all other days   Next INR check:  7/6/2022             Telephone call with Poli or Saleem who verbalizes understanding and agrees to plan    Lab visit scheduled    Education provided: Please call back if any changes to your diet, medications or how you've been taking warfarin    Plan made per [facility] anticoagulation protocol    Madhavi Sanders RN  Anticoagulation Clinic  6/8/2022    _______________________________________________________________________     Anticoagulation Episode Summary     Current INR goal:  2.0-3.0   TTR:  97.3 % (1 y)   Target end date:  Indefinite   Send INR reminders to:  [facility]    Indications    Chronic atrial fibrillation (H) [I48.20]  Long term current use of anticoagulant therapy [Z79.01]           Comments:           Anticoagulation Care Providers     Provider Role Specialty Phone number    Katie Martino MD Referring Family Medicine [phone]    Brianna Matute APRN CNP Referring Family Medicine [phone]

## 2022-06-27 NOTE — PROGRESS NOTES
Request for Amlodipine. Last script sent:1/20/22  Last OV:10/27/21  Next Visit Date: LM for the patient to call the office back to get an appointment scheduled. A message was also sent to the pharmacy. No future appointments.     Health Maintenance   Topic Date Due    Annual Wellness Visit (AWV)  Never done    COVID-19 Vaccine (1) Never done    Depression Screen  Never done    Hepatitis C screen  Never done    Colorectal Cancer Screen  Never done    Breast cancer screen  Never done    Shingles vaccine (1 of 2) Never done    DEXA (modify frequency per FRAX score)  Never done    Pneumococcal 65+ years Vaccine (2 - PCV) 12/16/2014    Lipids  10/15/2019    A1C test (Diabetic or Prediabetic)  11/20/2019    Flu vaccine (Season Ended) 09/01/2022    DTaP/Tdap/Td vaccine (2 - Td or Tdap) 10/22/2022    Hepatitis A vaccine  Aged Out    Hepatitis B vaccine  Aged Out    Hib vaccine  Aged Out    Meningococcal (ACWY) vaccine  Aged Out       Hemoglobin A1C (%)   Date Value   11/20/2018 5.8   10/15/2014 5.7   04/18/2013 5.8             ( goal A1C is < 7)   No results found for: LABMICR  LDL Cholesterol (mg/dL)   Date Value   10/15/2014 152 (H)       (goal LDL is <100)   AST (U/L)   Date Value   11/20/2018 19     ALT (U/L)   Date Value   11/20/2018 24     BUN (mg/dL)   Date Value   11/20/2018 15     BP Readings from Last 3 Encounters:   10/27/21 (!) 176/86   07/16/19 130/80   12/05/18 132/80          (goal 120/80)    All Future Testing planned in [facility]  Lab Frequency Next Occurrence   Lipid Panel Once 12/10/2021   CBC Once 12/10/2021   Comprehensive Metabolic Panel, Fasting Once 12/10/2021   Urinalysis Reflex to Culture Once 12/10/2021         Patient Active Problem List:     Hypertension     Anxiety     COPD (chronic obstructive pulmonary disease) (HCC)     Herpes Subjective     Antoine Russo is a 74 year old male who presents to clinic today for the following health issues:    HPI         Diabetes Follow-up    How often are you checking your blood sugar? Three times daily  Blood sugar testing frequency justification:  Patient modifying lifestyle changes (diet, exercise) with blood sugars  What time of day are you checking your blood sugars (select all that apply)?  Before meals, After meals and At bedtime  Have you had any blood sugars above 200?  Yes one time  Have you had any blood sugars below 70?  Yes 50    What symptoms do you notice when your blood sugar is low?  Confusion and Other: light headed    What concerns do you have today about your diabetes? None     Do you have any of these symptoms? (Select all that apply)  Numbness in feet and Burning in feet    Have you had a diabetic eye exam in the last 12 months? No    Lab Results   Component Value Date    A1C 7.1 10/03/2020    A1C 6.3 05/12/2020    A1C 6.5 02/04/2020    A1C 9.0 10/17/2019    A1C 9.7 07/18/2019     On insulin            Hyperlipidemia Follow-Up      Are you regularly taking any medication or supplement to lower your cholesterol?   Yes- rosuvastatin (CRESTOR) 10 MG tablet    Are you having muscle aches or other side effects that you think could be caused by your cholesterol lowering medication?  No  Recent Labs   Lab Test 02/04/20  0836 05/22/19  1159 04/14/15  0853 04/14/15  0853 05/15/14  0832   CHOL 143 151   < > 144 165   HDL 45 31*   < > 27* 30*   LDL 74 82   < > 76 75   TRIG 121 188*   < > 206* 302*   CHOLHDLRATIO  --   --   --  5.3* 5.0    < > = values in this interval not displayed.      Hypertension Follow-up      Do you check your blood pressure regularly outside of the clinic? No     Are you following a low salt diet? No    Are your blood pressures ever more than 140 on the top number (systolic) OR more   than 90 on the bottom number (diastolic), for example 140/90? No    Lisinopril,  "metoprolol.     BP Readings from Last 6 Encounters:   11/24/20 134/58   10/20/20 (!) 148/65   10/05/20 139/63   08/10/20 138/78   08/07/20 137/61   07/26/20 128/76      BP Readings from Last 2 Encounters:   11/24/20 134/58   10/20/20 (!) 148/65     Hemoglobin A1C (%)   Date Value   10/03/2020 7.1 (H)   05/12/2020 6.3 (H)     LDL Cholesterol Calculated (mg/dL)   Date Value   02/04/2020 74   05/22/2019 82         How many servings of fruits and vegetables do you eat daily?  0-1    On average, how many sweetened beverages do you drink each day (Examples: soda, juice, sweet tea, etc.  Do NOT count diet or artificially sweetened beverages)?   0    How many days per week do you exercise enough to make your heart beat faster? 3 or less    How many minutes a day do you exercise enough to make your heart beat faster? 9 or less    How many days per week do you miss taking your medication? 0    Right foot pain  Two weeks of right foot pain, swollen. Above the big toe. Not red. No remembered trauma. Hurts to walk. Has neuropathy and feet are numb but this is sharper pain, hurts to walk. He's worried about a stress fracture.     Review of Systems   ROS: 5 point ROS negative except as noted above in HPI, including Gen., Resp., CV, GI &  system review.       Objective    /58 (BP Location: Right arm, Patient Position: Sitting, Cuff Size: Adult Large)   Pulse 69   Temp 96.5  F (35.8  C) (Tympanic)   Resp 18   Ht 1.745 m (5' 8.7\")   Wt 91 kg (200 lb 9.6 oz)   SpO2 95%   BMI 29.88 kg/m    Body mass index is 29.88 kg/m .  Physical Exam   GENERAL: healthy, alert and no distress  NECK: no adenopathy, no asymmetry, masses, or scars and thyroid normal to palpation  RESP: lungs clear to auscultation - no rales, rhonchi or wheezes  CV: regular rate and rhythm, normal S1 S2, no S3 or S4, no murmur, click or rub, no peripheral edema and peripheral pulses strong  ABDOMEN: soft, nontender, no hepatosplenomegaly, no masses and " "bowel sounds normal  MS: right foot with no erythema, ecchymosis, warmth or edema. Numbness present, can't feel monofilament. Tender over first metatarsal length          Assessment & Plan     Hypertension, benign essential, goal below 140/90  Well controlled  - metoprolol succinate ER (TOPROL-XL) 50 MG 24 hr tablet; Take 1.5 tablets (75 mg) by mouth 2 times daily    Chronic atrial fibrillation (H)  Currently in sinus  - warfarin ANTICOAGULANT (COUMADIN) 2.5 MG tablet; Take 2.5mg (1 tab) Tues/Thurs/Sat; 1.25mg (1/2 tab) all other days or as directed by the Anticoagulation Clinic  Continue metoprolol as well    Type 2 diabetes mellitus with diabetic polyneuropathy, without long-term current use of insulin (H)  Well controlled  - insulin isophane human (NOVOLIN N FLEXPEN RELION) 100 UNIT/ML injection; Take 34 units of N in the am and 32 units in the pm.    Acute foot pain, right  Could have a stress fracture, has neuropathy and diabetes   - XR Foot Right G/E 3 Views; Future  Would consider MRI if that is negative     Long term current use of anticoagulant therapy    - INR     BMI:   Estimated body mass index is 29.88 kg/m  as calculated from the following:    Height as of this encounter: 1.745 m (5' 8.7\").    Weight as of this encounter: 91 kg (200 lb 9.6 oz).            Return in about 2 months (around 1/24/2021) for diabetes.    Katie Yoder MD  [facility]    "

## 2022-07-15 ENCOUNTER — LAB (OUTPATIENT)
Dept: LAB | Facility: CLINIC | Age: 77
End: 2022-07-15
Payer: COMMERCIAL

## 2022-07-15 ENCOUNTER — ANTICOAGULATION THERAPY VISIT (OUTPATIENT)
Dept: ANTICOAGULATION | Facility: CLINIC | Age: 77
End: 2022-07-15

## 2022-07-15 DIAGNOSIS — Z79.01 LONG TERM CURRENT USE OF ANTICOAGULANT THERAPY: ICD-10-CM

## 2022-07-15 DIAGNOSIS — I48.20 CHRONIC ATRIAL FIBRILLATION (H): ICD-10-CM

## 2022-07-15 DIAGNOSIS — I48.20 CHRONIC ATRIAL FIBRILLATION (H): Primary | ICD-10-CM

## 2022-07-15 LAB — INR BLD: 2.6 (ref 0.9–1.1)

## 2022-07-15 PROCEDURE — 85610 PROTHROMBIN TIME: CPT

## 2022-07-15 PROCEDURE — 36415 COLL VENOUS BLD VENIPUNCTURE: CPT

## 2022-07-15 NOTE — PROGRESS NOTES
ANTICOAGULATION MANAGEMENT     Antoine Russo 76 year old male is on warfarin with therapeutic INR result. (Goal INR 2.0-3.0)    Recent labs: (last 7 days)     07/15/22  0654   INR 2.6*       ASSESSMENT       Source(s): Patient/Caregiver Call       Warfarin doses taken: Warfarin taken as instructed    Diet: No new diet changes identified    New illness, injury, or hospitalization: No    Medication/supplement changes: None noted    Signs or symptoms of bleeding or clotting: Scratched arm on a bush which caused a bleed. Patient has it wrapped.    Previous INR: Therapeutic last 2(+) visits    Additional findings: None       PLAN     Recommended plan for no diet, medication or health factor changes affecting INR     Dosing Instructions: continue your current warfarin dose with next INR in 5 weeks       Summary  As of 7/15/2022    Full warfarin instructions:  2.5 mg every Tue, Thu, Sat; 1.25 mg all other days   Next INR check:  8/19/2022             Telephone call with Poli or Saleem who verbalizes understanding and agrees to plan    Lab visit scheduled    Education provided: Please call back if any changes to your diet, medications or how you've been taking warfarin, Monitoring for bleeding signs and symptoms, When to seek medical attention/emergency care and Contact [phone]  with any changes, questions or concerns.     Plan made per [facility] anticoagulation protocol    Angela Harrell RN  Anticoagulation Clinic  7/15/2022    _______________________________________________________________________     Anticoagulation Episode Summary     Current INR goal:  2.0-3.0   TTR:  97.3 % (1 y)   Target end date:  Indefinite   Send INR reminders to:  [facility]    Indications    Chronic atrial fibrillation (H) [I48.20]  Long term current use of anticoagulant therapy [Z79.01]           Comments:           Anticoagulation Care Providers     Provider Role Specialty Phone number    Katie Martino MD Referring  Family Medicine [phone]    Brianna Matute APRN [facility] Referring Family Medicine [phone]

## 2022-07-19 ENCOUNTER — OFFICE VISIT (OUTPATIENT)
Dept: FAMILY MEDICINE | Facility: CLINIC | Age: 77
End: 2022-07-19
Payer: COMMERCIAL

## 2022-07-19 VITALS
OXYGEN SATURATION: 97 % | BODY MASS INDEX: 31.57 KG/M2 | TEMPERATURE: 97 F | WEIGHT: 220 LBS | DIASTOLIC BLOOD PRESSURE: 82 MMHG | RESPIRATION RATE: 20 BRPM | HEART RATE: 78 BPM | SYSTOLIC BLOOD PRESSURE: 138 MMHG

## 2022-07-19 DIAGNOSIS — I73.9 PVD (PERIPHERAL VASCULAR DISEASE) (H): ICD-10-CM

## 2022-07-19 DIAGNOSIS — Z79.4 TYPE 2 DIABETES MELLITUS WITH DIABETIC NEUROPATHY, WITH LONG-TERM CURRENT USE OF INSULIN (H): Primary | ICD-10-CM

## 2022-07-19 DIAGNOSIS — I48.20 CHRONIC ATRIAL FIBRILLATION (H): ICD-10-CM

## 2022-07-19 DIAGNOSIS — I10 HYPERTENSION, BENIGN ESSENTIAL, GOAL BELOW 140/90: ICD-10-CM

## 2022-07-19 DIAGNOSIS — E78.5 HYPERLIPIDEMIA LDL GOAL <100: ICD-10-CM

## 2022-07-19 DIAGNOSIS — Z53.9 ERRONEOUS ENCOUNTER--DISREGARD: Primary | ICD-10-CM

## 2022-07-19 DIAGNOSIS — E11.40 TYPE 2 DIABETES MELLITUS WITH DIABETIC NEUROPATHY, WITH LONG-TERM CURRENT USE OF INSULIN (H): Primary | ICD-10-CM

## 2022-07-19 DIAGNOSIS — E11.42 TYPE 2 DIABETES MELLITUS WITH DIABETIC POLYNEUROPATHY, WITHOUT LONG-TERM CURRENT USE OF INSULIN (H): Primary | ICD-10-CM

## 2022-07-19 DIAGNOSIS — I73.9 PERIPHERAL VASCULAR DISEASE (H): ICD-10-CM

## 2022-07-19 LAB
ALBUMIN SERPL-MCNC: 3.1 G/DL (ref 3.4–5)
ALP SERPL-CCNC: 55 U/L (ref 40–150)
ALT SERPL W P-5'-P-CCNC: 27 U/L (ref 0–70)
ANION GAP SERPL CALCULATED.3IONS-SCNC: 4 MMOL/L (ref 3–14)
AST SERPL W P-5'-P-CCNC: 25 U/L (ref 0–45)
BASOPHILS # BLD AUTO: 0 10E3/UL (ref 0–0.2)
BASOPHILS NFR BLD AUTO: 1 %
BILIRUB SERPL-MCNC: 0.7 MG/DL (ref 0.2–1.3)
BUN SERPL-MCNC: 16 MG/DL (ref 7–30)
CALCIUM SERPL-MCNC: 9.6 MG/DL (ref 8.5–10.1)
CHLORIDE BLD-SCNC: 101 MMOL/L (ref 94–109)
CO2 SERPL-SCNC: 30 MMOL/L (ref 20–32)
CREAT SERPL-MCNC: 0.88 MG/DL (ref 0.66–1.25)
EOSINOPHIL # BLD AUTO: 0.2 10E3/UL (ref 0–0.7)
EOSINOPHIL NFR BLD AUTO: 2 %
ERYTHROCYTE [DISTWIDTH] IN BLOOD BY AUTOMATED COUNT: 15.9 % (ref 10–15)
GFR SERPL CREATININE-BSD FRML MDRD: 90 ML/MIN/1.73M2
GLUCOSE BLD-MCNC: 152 MG/DL (ref 70–99)
HBA1C MFR BLD: 9.2 % (ref 0–5.6)
HCT VFR BLD AUTO: 47 % (ref 40–53)
HGB BLD-MCNC: 15.8 G/DL (ref 13.3–17.7)
IMM GRANULOCYTES # BLD: 0 10E3/UL
IMM GRANULOCYTES NFR BLD: 0 %
LYMPHOCYTES # BLD AUTO: 1.9 10E3/UL (ref 0.8–5.3)
LYMPHOCYTES NFR BLD AUTO: 23 %
MCH RBC QN AUTO: 33.8 PG (ref 26.5–33)
MCHC RBC AUTO-ENTMCNC: 33.6 G/DL (ref 31.5–36.5)
MCV RBC AUTO: 100 FL (ref 78–100)
MONOCYTES # BLD AUTO: 0.9 10E3/UL (ref 0–1.3)
MONOCYTES NFR BLD AUTO: 10 %
NEUTROPHILS # BLD AUTO: 5.4 10E3/UL (ref 1.6–8.3)
NEUTROPHILS NFR BLD AUTO: 64 %
PLATELET # BLD AUTO: 177 10E3/UL (ref 150–450)
POTASSIUM BLD-SCNC: 4.7 MMOL/L (ref 3.4–5.3)
PROT SERPL-MCNC: 7.3 G/DL (ref 6.8–8.8)
RBC # BLD AUTO: 4.68 10E6/UL (ref 4.4–5.9)
SODIUM SERPL-SCNC: 135 MMOL/L (ref 133–144)
WBC # BLD AUTO: 8.4 10E3/UL (ref 4–11)

## 2022-07-19 PROCEDURE — 90734 MENACWYD/MENACWYCRM VACC IM: CPT | Performed by: FAMILY MEDICINE

## 2022-07-19 PROCEDURE — 99214 OFFICE O/P EST MOD 30 MIN: CPT | Mod: 25 | Performed by: FAMILY MEDICINE

## 2022-07-19 PROCEDURE — 80053 COMPREHEN METABOLIC PANEL: CPT | Performed by: FAMILY MEDICINE

## 2022-07-19 PROCEDURE — 83036 HEMOGLOBIN GLYCOSYLATED A1C: CPT | Performed by: FAMILY MEDICINE

## 2022-07-19 PROCEDURE — 85025 COMPLETE CBC W/AUTO DIFF WBC: CPT | Performed by: FAMILY MEDICINE

## 2022-07-19 PROCEDURE — 90471 IMMUNIZATION ADMIN: CPT | Performed by: FAMILY MEDICINE

## 2022-07-19 PROCEDURE — 36415 COLL VENOUS BLD VENIPUNCTURE: CPT | Performed by: FAMILY MEDICINE

## 2022-07-19 ASSESSMENT — PAIN SCALES - GENERAL: PAINLEVEL: NO PAIN (0)

## 2022-07-19 NOTE — PATIENT INSTRUCTIONS
PAD rehab  [phone]    See vascular again    Lab Results   Component Value Date    A1C 9.2 07/19/2022    A1C 9.1 04/19/2022    A1C 9.3 01/18/2022    A1C 8.6 10/14/2021    A1C 8.5 07/13/2021    A1C 7.1 10/03/2020    A1C 6.3 05/12/2020    A1C 6.5 02/04/2020    A1C 9.0 10/17/2019    A1C 9.7 07/18/2019

## 2022-07-19 NOTE — PROGRESS NOTES
"  Assessment & Plan     Type 2 diabetes mellitus with diabetic neuropathy, with long-term current use of insulin (H)  Not well controlled  Will ask to see Ruthann Alaniz  Cost is an issue  - Hemoglobin A1c; Future  - Comprehensive metabolic panel (BMP + Alb, Alk Phos, ALT, AST, Total. Bili, TP); Future  - CBC with platelets and differential; Future  - CBC with platelets and differential  - Comprehensive metabolic panel (BMP + Alb, Alk Phos, ALT, AST, Total. Bili, TP)  - Hemoglobin A1c  - [facility] Adult Diabetes Educator Referral; Future    Chronic atrial fibrillation (H)  On warfarin for anticoagulation  Metoprolol for rate control  - ASPIRIN NOT PRESCRIBED (INTENTIONAL); Please choose reason not prescribed from choices below.    Hyperlipidemia LDL goal <100  On statin    Hypertension, benign essential, goal below 140/90  Well controlled  Continue lisinopril, metoprolol    PVD (peripheral vascular disease) (H)  Didn't go to PVD rehab, didn't understand that was ordered for him.  At this point is really limiting his activities. He would like to establish with someone in [place]  - Vascular Surgery Referral; Future    Patient Instructions     PAD rehab  [phone]    See vascular again    Lab Results   Component Value Date    A1C 9.2 07/19/2022    A1C 9.1 04/19/2022    A1C 9.3 01/18/2022    A1C 8.6 10/14/2021    A1C 8.5 07/13/2021    A1C 7.1 10/03/2020    A1C 6.3 05/12/2020    A1C 6.5 02/04/2020    A1C 9.0 10/17/2019    A1C 9.7 07/18/2019              BMI:   Estimated body mass index is 31.57 kg/m  as calculated from the following:    Height as of 5/24/22: 1.778 m (5' 10\").    Weight as of this encounter: 99.8 kg (220 lb).   Weight management plan: Discussed healthy diet and exercise guidelines        No follow-ups on file.    Katie Yoder MD  [facility]    Terri Monge is a 76 year old, presenting for the following health issues:  Diabetes      History of Present Illness "       Hypertension: He presents for follow up of hypertension.  He does not check blood pressure  regularly outside of the clinic. Outpatient blood pressures have not been over 140/90. He does not follow a low salt diet.     Vascular Disease:  He presents for follow up of vascular disease.  He never takes nitroglycerin. He is not taking daily aspirin.        Diabetes Follow-up    How often are you checking your blood sugar? Two times daily  Blood sugar testing frequency justification:    What time of day are you checking your blood sugars (select all that apply)?  Before and after meals  Have you had any blood sugars above 200?  No  Have you had any blood sugars below 70?  No    What symptoms do you notice when your blood sugar is low?  None    What concerns do you have today about your diabetes? None     Do you have any of these symptoms? (Select all that apply)  Numbness in feet and Burning in feet    On insulin NPH 40 units am, 32 units pm  Lab Results   Component Value Date    A1C 9.1 04/19/2022    A1C 9.3 01/18/2022    A1C 8.6 10/14/2021    A1C 8.5 07/13/2021    A1C 7.1 10/03/2020    A1C 6.3 05/12/2020    A1C 6.5 02/04/2020    A1C 9.0 10/17/2019    A1C 9.7 07/18/2019        BP Readings from Last 2 Encounters:   07/19/22 138/82   05/25/22 (!) 156/79     Hemoglobin A1C POCT (%)   Date Value   10/03/2020 7.1 (H)   05/12/2020 6.3 (H)     Hemoglobin A1C (%)   Date Value   07/19/2022 9.2 (H)   04/19/2022 9.1 (H)     LDL Cholesterol Calculated (mg/dL)   Date Value   01/18/2022 48   02/22/2021 54   02/04/2020 74             Hyperlipidemia Follow-Up      Are you regularly taking any medication or supplement to lower your cholesterol?   Yes- Crestor    Are you having muscle aches or other side effects that you think could be caused by your cholesterol lowering medication?  No    hypertension   BP Readings from Last 6 Encounters:   07/19/22 138/82   05/25/22 (!) 156/79   05/07/22 124/76   04/19/22 139/80   04/15/22 (!)  169/95   04/13/22 138/72      On metoprolol, lisinopril    Review of Systems   Constitutional, HEENT, cardiovascular, pulmonary, gi and gu systems are negative, except as otherwise noted.      Objective    /82 (BP Location: Right arm)   Pulse 78   Temp 97  F (36.1  C) (Tympanic)   Resp 20   Wt 99.8 kg (220 lb)   SpO2 97%   BMI 31.57 kg/m    Body mass index is 31.57 kg/m .  Physical Exam   GENERAL: healthy, alert and no distress  NECK: no adenopathy, no asymmetry, masses, or scars and thyroid normal to palpation  RESP: lungs clear to auscultation - no rales, rhonchi or wheezes  CV: regular rate and rhythm, normal S1 S2, no S3 or S4, no murmur, click or rub, no peripheral edema and peripheral pulses strong  ABDOMEN: soft, nontender, no hepatosplenomegaly, no masses and bowel sounds normal  MS: no gross musculoskeletal defects noted, no edema                  .  ..

## 2022-07-23 DIAGNOSIS — E11.40 TYPE 2 DIABETES MELLITUS WITH DIABETIC NEUROPATHY, WITH LONG-TERM CURRENT USE OF INSULIN (H): ICD-10-CM

## 2022-07-23 DIAGNOSIS — Z79.4 TYPE 2 DIABETES MELLITUS WITH DIABETIC NEUROPATHY, WITH LONG-TERM CURRENT USE OF INSULIN (H): ICD-10-CM

## 2022-07-25 ENCOUNTER — MYC MEDICAL ADVICE (OUTPATIENT)
Dept: FAMILY MEDICINE | Facility: CLINIC | Age: 77
End: 2022-07-25

## 2022-07-25 DIAGNOSIS — I48.20 CHRONIC ATRIAL FIBRILLATION (H): ICD-10-CM

## 2022-07-25 RX ORDER — PREGABALIN 150 MG/1
CAPSULE ORAL
Qty: 60 CAPSULE | Refills: 5 | Status: SHIPPED | OUTPATIENT
Start: 2022-07-25 | End: 2023-01-23

## 2022-07-25 NOTE — TELEPHONE ENCOUNTER
Routing to ordering provider for consideration, not on refill protocol.           Zaynab Ashford     RN MSN

## 2022-07-26 RX ORDER — WARFARIN SODIUM 2.5 MG/1
TABLET ORAL
Qty: 70 TABLET | Refills: 1 | Status: SHIPPED | OUTPATIENT
Start: 2022-07-26 | End: 2022-11-03

## 2022-07-26 NOTE — TELEPHONE ENCOUNTER
ANTICOAGULATION MANAGEMENT:  Medication Refill    Anticoagulation Summary  As of 7/15/2022    Warfarin maintenance plan:  2.5 mg (2.5 mg x 1) every Tue, Thu, Sat; 1.25 mg (2.5 mg x 0.5) all other days   Next INR check:  8/19/2022   Target end date:  Indefinite    Indications    Chronic atrial fibrillation (H) [I48.20]  Long term current use of anticoagulant therapy [Z79.01]             Anticoagulation Care Providers     Provider Role Specialty Phone number    Katie Martino MD Referring Family Medicine [phone]    Brianna Matute APRN CNP Referring Family Medicine [phone]          Visit with referring provider/group within last year: Yes    [facility] referral signed within last year: Yes    Antoine meets all criteria for refill (current [facility] referral, office visit with referring provider/group in last year, lab monitoring up to date or not exceeding 2 weeks overdue). Rx instructions and quantity match patient's current dosing plan. Warfarin 90 day supply with 1 refill granted per [facility] protocol     Maria Victoria Castaneda RN  Anticoagulation Clinic

## 2022-08-03 DIAGNOSIS — N40.0 HYPERTROPHY OF PROSTATE WITHOUT URINARY OBSTRUCTION: ICD-10-CM

## 2022-08-03 DIAGNOSIS — R39.15 URGENCY OF URINATION: ICD-10-CM

## 2022-08-03 RX ORDER — TAMSULOSIN HYDROCHLORIDE 0.4 MG/1
CAPSULE ORAL
Qty: 90 CAPSULE | Refills: 1 | Status: SHIPPED | OUTPATIENT
Start: 2022-08-03 | End: 2023-02-09

## 2022-08-10 ENCOUNTER — VIRTUAL VISIT (OUTPATIENT)
Dept: EDUCATION SERVICES | Facility: CLINIC | Age: 77
End: 2022-08-10
Attending: FAMILY MEDICINE
Payer: COMMERCIAL

## 2022-08-10 DIAGNOSIS — E11.40 TYPE 2 DIABETES MELLITUS WITH DIABETIC NEUROPATHY, WITH LONG-TERM CURRENT USE OF INSULIN (H): Primary | ICD-10-CM

## 2022-08-10 DIAGNOSIS — Z79.4 TYPE 2 DIABETES MELLITUS WITH DIABETIC NEUROPATHY, WITH LONG-TERM CURRENT USE OF INSULIN (H): Primary | ICD-10-CM

## 2022-08-10 PROCEDURE — G0108 DIAB MANAGE TRN  PER INDIV: HCPCS | Mod: 95 | Performed by: DIETITIAN, REGISTERED

## 2022-08-10 NOTE — PATIENT INSTRUCTIONS
PLAN  1. Start Jardiance  2. Eat at least 1 serving of vegetables each day  3. Check blood sugar at least 2x per day

## 2022-08-10 NOTE — LETTER
8/10/2022         RE: Antoine Russo  [address] [address]        Dear Colleague,    Thank you for referring your patient, Antoine Russo, to the [facility]. Please see a copy of my visit note below.    Type of Service: Telephone Visit    Originating Location (Patient Location): Home  Distant Location (Provider Location): [facility]  Mode of Communication:  Telephone    Telephone Visit Start Time: 8:02  Telephone Visit End Time (telephone visit stop time): 8:34    How would patient like to obtain AVS? Sarah    Diabetes Self-Management Education & Support    Presents for: Individual review    Type of Visit: Telephone Visit    How would patient like to obtain AVS? [place]    ASSESSMENT:  Poli reports BG have been running in the 200s for sometime. He wears a Freestyle Berta. He eats 2 meals per day breakfast and dinner, doesn't pay attention to cho. We discussed why eating a consistent cho diet is good so we can best adjust medications to match this. He is currently taking NPH pens from [employer], goes through more than a box per month. Discussed that he may be able to get cheaper with his insurance (pharm liaison ran Lantus for $47 per month), he isn't convinced of this and would like to continue with NPH. He is open to trying Jardiance so will start this. Will f/u next month.     Patient's most recent   Lab Results   Component Value Date    A1C 9.2 07/19/2022    A1C 7.1 10/03/2020    is not meeting goal of <8.0    Diabetes knowledge and skills assessment:   Patient is knowledgeable in diabetes management concepts related to: Monitoring and Taking Medication    Continue education with the following diabetes management concepts: Healthy Eating, Being Active, Monitoring, Taking Medication, Problem Solving, Reducing Risks and Healthy Coping    Based on learning assessment above, most appropriate setting for further diabetes education  "would be: Individual setting.    INTERVENTIONS:    Education provided today on:  AADE Self-Care Behaviors:  Healthy Eating: carbohydrate counting, consistency in amount, composition, and timing of food intake, weight reduction, eating out, portion control and plate planning method  Being Active: relationship to blood glucose and precautions to take  Taking Medication: Jardiance start-action of prescribed medication, side effects of prescribed medications and when to take medications  Problem Solving: low blood glucose - causes, signs/symptoms, treatment and prevention    Opportunities for ongoing education and support in diabetes-self management were discussed. Pt verbalized understanding of concepts discussed and recommendations provided today.       Education Materials Provided:  No new materials provided today    PLAN  1. Start Jardiance  2. Eat at least 1 serving of vegetables each day  3. Check blood sugar at least 2x per day    Topics to cover at upcoming visits: Healthy Eating, Being Active, Monitoring, Taking Medication, Problem Solving, Reducing Risks and Healthy Coping  Follow-up:     See Goals Section for co-developed, patient-stated behavior change goals.  AVS provided to patient today.          SUBJECTIVE / OBJECTIVE:  Presents for: Individual review  Accompanied by: Self  Focus of Visit: Patient Unsure  Diabetes type: Type 2  Date of diagnosis: unsure  Other concerns:: None  Cultural Influences/Ethnic Background:  Choose not to answer      Diabetes Symptoms & Complications:  Weight trend: Stable       Patient Problem List and Family Medical History reviewed for relevant medical history, current medical status, and diabetes risk factors.    Vitals:  There were no vitals taken for this visit.  Estimated body mass index is 31.57 kg/m  as calculated from the following:    Height as of 5/24/22: 1.778 m (5' 10\").    Weight as of 7/19/22: 99.8 kg (220 lb).   Last 3 BP:   BP Readings from Last 3 Encounters: "   07/19/22 138/82   05/25/22 (!) 156/79   05/07/22 124/76       History   Smoking Status     Former Smoker     Packs/day: 1.00     Years: 40.00     Types: Cigarettes     Quit date: 11/24/2006   Smokeless Tobacco     Never Used       Labs:  Lab Results   Component Value Date    A1C 9.2 07/19/2022    A1C 7.1 10/03/2020     Lab Results   Component Value Date     07/19/2022     02/22/2021     Lab Results   Component Value Date    LDL 48 01/18/2022    LDL 54 02/22/2021     HDL Cholesterol   Date Value Ref Range Status   02/22/2021 29 (L) >39 mg/dL Final     Direct Measure HDL   Date Value Ref Range Status   01/18/2022 30 (L) >=40 mg/dL Final   ]  GFR Estimate   Date Value Ref Range Status   07/19/2022 90 >60 mL/min/1.73m2 Final     Comment:     Effective December 21, 2021 eGFRcr in adults is calculated using the 2021 CKD-EPI creatinine equation which includes age and gender (Vijaya sands al., NEJM, DOI: 10.1056/SWLFsk6449215)   02/22/2021 65 >60 mL/min/[1.73_m2] Final     Comment:     Non  GFR Calc  Starting 12/18/2018, serum creatinine based estimated GFR (eGFR) will be   calculated using the Chronic Kidney Disease Epidemiology Collaboration   (CKD-EPI) equation.       GFR Estimate If Black   Date Value Ref Range Status   02/22/2021 76 >60 mL/min/[1.73_m2] Final     Comment:      GFR Calc  Starting 12/18/2018, serum creatinine based estimated GFR (eGFR) will be   calculated using the Chronic Kidney Disease Epidemiology Collaboration   (CKD-EPI) equation.       Lab Results   Component Value Date    CR 0.88 07/19/2022    CR 1.11 02/22/2021     No results found for: MICROALBUMIN    Healthy Eating:  Healthy Eating Assessed Today: Yes  Breakfast: 8-pizza rolls OR pot stickers + coffee  Lunch: skips  Dinner: 5:30-similar to breakfast OR omelette + whole wheat toast  Other: doesn't eat vegetables.  Beverages: Coffee, Water, Alcohol (couple beers per day)    Being Active:  Being Active  Assessed Today: Yes  Exercise:: Currently not exercising (neuropathy)  Barrier to exercise: Physical limitation    Monitoring:  Monitoring Assessed Today: Yes  Blood Glucose Meter: CGM  Times checking blood sugar at home (number): 2  Times checking blood sugar at home (per): Day    Glucose data:  BG in the low 200s, checks before breakast and bedtime. Did have 1 fasting 177    Taking Medications:  Diabetes Medication(s)     Insulin       insulin NPH (NOVOLIN N FLEXPEN RELION) 100 UNIT/ML injection    Take 40 units of N in the am and 32 units in the pm.        Taking Medication Assessed Today: Yes  Current Treatments: Insulin Injections  Given by: Patient  Problems taking diabetes medications regularly?: No  Diabetes medication side effects?: No    Problem Solving:  Problem Solving Assessed Today: Yes  Is the patient at risk for hypoglycemia?: Yes  Hypoglycemia Frequency: Rarely      Reducing Risks:  Reducing Risks Assessed Today: No    Healthy Coping:  Healthy Coping Assessed Today: Yes  Emotional response to diabetes: Ready to learn  Stage of change: ACTION (Actively working towards change)  Patient Activation Measure Survey Score:  RIDDHI Score (Last Two) 2/15/2011   RIDDHI Raw Score 41   Activation Score 63.2   RIDDHI Level 3         Felipa Parnell RD, LD, [facility]      Time Spent: 30 minutes  Encounter Type: Individual        Any diabetes medication dose changes were made via the Certified Diabetes Care & Education Protocol in collaboration with the patient's primary care provider. A copy of this encounter was shared with the provider.

## 2022-08-10 NOTE — PROGRESS NOTES
Type of Service: Telephone Visit    Originating Location (Patient Location): Home  Distant Location (Provider Location): [facility]  Mode of Communication:  Telephone    Telephone Visit Start Time: 8:02  Telephone Visit End Time (telephone visit stop time): 8:34    How would patient like to obtain AVS? Sarah    Diabetes Self-Management Education & Support    Presents for: Individual review    Type of Visit: Telephone Visit    How would patient like to obtain AVS? Sarah    ASSESSMENT:  Poli reports BG have been running in the 200s for sometime. He wears a Freestyle Berta. He eats 2 meals per day breakfast and dinner, doesn't pay attention to cho. We discussed why eating a consistent cho diet is good so we can best adjust medications to match this. He is currently taking NPH pens from [employer], goes through more than a box per month. Discussed that he may be able to get cheaper with his insurance (pharm liaison ran Lantus for $47 per month), he isn't convinced of this and would like to continue with NPH. He is open to trying Jardiance so will start this. Will f/u next month.     Patient's most recent   Lab Results   Component Value Date    A1C 9.2 07/19/2022    A1C 7.1 10/03/2020    is not meeting goal of <8.0    Diabetes knowledge and skills assessment:   Patient is knowledgeable in diabetes management concepts related to: Monitoring and Taking Medication    Continue education with the following diabetes management concepts: Healthy Eating, Being Active, Monitoring, Taking Medication, Problem Solving, Reducing Risks and Healthy Coping    Based on learning assessment above, most appropriate setting for further diabetes education would be: Individual setting.    INTERVENTIONS:    Education provided today on:  AADE Self-Care Behaviors:  Healthy Eating: carbohydrate counting, consistency in amount, composition, and timing of food intake, weight reduction, eating out, portion control and  "plate planning method  Being Active: relationship to blood glucose and precautions to take  Taking Medication: Jardiance start-action of prescribed medication, side effects of prescribed medications and when to take medications  Problem Solving: low blood glucose - causes, signs/symptoms, treatment and prevention    Opportunities for ongoing education and support in diabetes-self management were discussed. Pt verbalized understanding of concepts discussed and recommendations provided today.       Education Materials Provided:  No new materials provided today    PLAN  1. Start Jardiance  2. Eat at least 1 serving of vegetables each day  3. Check blood sugar at least 2x per day    Topics to cover at upcoming visits: Healthy Eating, Being Active, Monitoring, Taking Medication, Problem Solving, Reducing Risks and Healthy Coping  Follow-up:     See Goals Section for co-developed, patient-stated behavior change goals.  AVS provided to patient today.          SUBJECTIVE / OBJECTIVE:  Presents for: Individual review  Accompanied by: Self  Focus of Visit: Patient Unsure  Diabetes type: Type 2  Date of diagnosis: unsure  Other concerns:: None  Cultural Influences/Ethnic Background:  Choose not to answer      Diabetes Symptoms & Complications:  Weight trend: Stable       Patient Problem List and Family Medical History reviewed for relevant medical history, current medical status, and diabetes risk factors.    Vitals:  There were no vitals taken for this visit.  Estimated body mass index is 31.57 kg/m  as calculated from the following:    Height as of 5/24/22: 1.778 m (5' 10\").    Weight as of 7/19/22: 99.8 kg (220 lb).   Last 3 BP:   BP Readings from Last 3 Encounters:   07/19/22 138/82   05/25/22 (!) 156/79   05/07/22 124/76       History   Smoking Status     Former Smoker     Packs/day: 1.00     Years: 40.00     Types: Cigarettes     Quit date: 11/24/2006   Smokeless Tobacco     Never Used       Labs:  Lab Results "   Component Value Date    A1C 9.2 07/19/2022    A1C 7.1 10/03/2020     Lab Results   Component Value Date     07/19/2022     02/22/2021     Lab Results   Component Value Date    LDL 48 01/18/2022    LDL 54 02/22/2021     HDL Cholesterol   Date Value Ref Range Status   02/22/2021 29 (L) >39 mg/dL Final     Direct Measure HDL   Date Value Ref Range Status   01/18/2022 30 (L) >=40 mg/dL Final   ]  GFR Estimate   Date Value Ref Range Status   07/19/2022 90 >60 mL/min/1.73m2 Final     Comment:     Effective December 21, 2021 eGFRcr in adults is calculated using the 2021 CKD-EPI creatinine equation which includes age and gender (Vijaya et al., NEJ, DOI: 10.1056/DXBMeo4177397)   02/22/2021 65 >60 mL/min/[1.73_m2] Final     Comment:     Non  GFR Calc  Starting 12/18/2018, serum creatinine based estimated GFR (eGFR) will be   calculated using the Chronic Kidney Disease Epidemiology Collaboration   (CKD-EPI) equation.       GFR Estimate If Black   Date Value Ref Range Status   02/22/2021 76 >60 mL/min/[1.73_m2] Final     Comment:      GFR Calc  Starting 12/18/2018, serum creatinine based estimated GFR (eGFR) will be   calculated using the Chronic Kidney Disease Epidemiology Collaboration   (CKD-EPI) equation.       Lab Results   Component Value Date    CR 0.88 07/19/2022    CR 1.11 02/22/2021     No results found for: MICROALBUMIN    Healthy Eating:  Healthy Eating Assessed Today: Yes  Breakfast: 8-pizza rolls OR pot stickers + coffee  Lunch: skips  Dinner: 5:30-similar to breakfast OR omelette + whole wheat toast  Other: doesn't eat vegetables.  Beverages: Coffee, Water, Alcohol (couple beers per day)    Being Active:  Being Active Assessed Today: Yes  Exercise:: Currently not exercising (neuropathy)  Barrier to exercise: Physical limitation    Monitoring:  Monitoring Assessed Today: Yes  Blood Glucose Meter: CGM  Times checking blood sugar at home (number): 2  Times checking  blood sugar at home (per): Day    Glucose data:  BG in the low 200s, checks before breakast and bedtime. Did have 1 fasting 177    Taking Medications:  Diabetes Medication(s)     Insulin       insulin NPH (NOVOLIN N FLEXPEN RELION) 100 UNIT/ML injection    Take 40 units of N in the am and 32 units in the pm.        Taking Medication Assessed Today: Yes  Current Treatments: Insulin Injections  Given by: Patient  Problems taking diabetes medications regularly?: No  Diabetes medication side effects?: No    Problem Solving:  Problem Solving Assessed Today: Yes  Is the patient at risk for hypoglycemia?: Yes  Hypoglycemia Frequency: Rarely      Reducing Risks:  Reducing Risks Assessed Today: No    Healthy Coping:  Healthy Coping Assessed Today: Yes  Emotional response to diabetes: Ready to learn  Stage of change: ACTION (Actively working towards change)  Patient Activation Measure Survey Score:  RIDDHI Score (Last Two) 2/15/2011   RIDDHI Raw Score 41   Activation Score 63.2   RIDDHI Level 3         Felipa Parnell RD, GERALDINE, [facility]      Time Spent: 30 minutes  Encounter Type: Individual        Any diabetes medication dose changes were made via the Certified Diabetes Care & Education Protocol in collaboration with the patient's primary care provider. A copy of this encounter was shared with the provider.

## 2022-08-16 ENCOUNTER — HOSPITAL ENCOUNTER (OUTPATIENT)
Dept: ULTRASOUND IMAGING | Facility: CLINIC | Age: 77
Discharge: HOME | End: 2022-08-16
Attending: FAMILY MEDICINE
Payer: COMMERCIAL

## 2022-08-16 DIAGNOSIS — I73.9 PERIPHERAL VASCULAR DISEASE (H): ICD-10-CM

## 2022-08-16 DIAGNOSIS — E11.42 TYPE 2 DIABETES MELLITUS WITH DIABETIC POLYNEUROPATHY, WITHOUT LONG-TERM CURRENT USE OF INSULIN (H): ICD-10-CM

## 2022-08-16 PROCEDURE — 93922 UPR/L XTREMITY ART 2 LEVELS: CPT

## 2022-08-16 PROCEDURE — 93925 LOWER EXTREMITY STUDY: CPT

## 2022-08-19 ENCOUNTER — ANTICOAGULATION THERAPY VISIT (OUTPATIENT)
Dept: ANTICOAGULATION | Facility: CLINIC | Age: 77
End: 2022-08-19

## 2022-08-19 ENCOUNTER — LAB (OUTPATIENT)
Dept: LAB | Facility: CLINIC | Age: 77
End: 2022-08-19
Payer: COMMERCIAL

## 2022-08-19 DIAGNOSIS — Z79.01 LONG TERM CURRENT USE OF ANTICOAGULANT THERAPY: ICD-10-CM

## 2022-08-19 DIAGNOSIS — I48.20 CHRONIC ATRIAL FIBRILLATION (H): ICD-10-CM

## 2022-08-19 DIAGNOSIS — I48.20 CHRONIC ATRIAL FIBRILLATION (H): Primary | ICD-10-CM

## 2022-08-19 LAB — INR BLD: 3.2 (ref 0.9–1.1)

## 2022-08-19 PROCEDURE — 36416 COLLJ CAPILLARY BLOOD SPEC: CPT

## 2022-08-19 PROCEDURE — 85610 PROTHROMBIN TIME: CPT

## 2022-08-19 NOTE — PROGRESS NOTES
ANTICOAGULATION MANAGEMENT     Antoine Russo 76 year old male is on warfarin with supratherapeutic INR result. (Goal INR 2.0-3.0)    Recent labs: (last 7 days)     08/19/22  0649   INR 3.2*       ASSESSMENT       Source(s): Chart Review and Patient/Caregiver Call       Warfarin doses taken: Warfarin taken as instructed    Diet: No new diet changes identified    New illness, injury, or hospitalization: No    Medication/supplement changes: jardiance started 8/10 - no interactions anticipated      Signs or symptoms of bleeding or clotting: bruising on left hand - no concerns, his normal bruising     Previous INR: Therapeutic last 2(+) visits    Additional findings: None     PLAN     Recommended plan for no diet, medication or health factor changes affecting INR     Dosing Instructions: Continue your current warfarin dose with next INR in 2 weeks       Summary  As of 8/19/2022    Full warfarin instructions:  2.5 mg every Tue, Thu, Sat; 1.25 mg all other days   Next INR check:  8/31/2022             Telephone call with Poli or Saleem who verbalizes understanding and agrees to plan    Lab visit scheduled    Education provided: Please call back if any changes to your diet, medications or how you've been taking warfarin and Monitoring for bleeding signs and symptoms    Plan made per [facility] anticoagulation protocol    Madhavi Sanders RN  Anticoagulation Clinic  8/19/2022    _______________________________________________________________________     Anticoagulation Episode Summary     Current INR goal:  2.0-3.0   TTR:  94.1 % (1 y)   Target end date:  Indefinite   Send INR reminders to:  [facility]    Indications    Chronic atrial fibrillation (H) [I48.20]  Long term current use of anticoagulant therapy [Z79.01]           Comments:           Anticoagulation Care Providers     Provider Role Specialty Phone number    Katie Martino MD Referring Family Medicine [phone]    Brianna Matute APRN CNP  [facility] [phone]

## 2022-08-29 NOTE — PROGRESS NOTES
Patient is in clinic today to see MD Mervin Stacy.      Patient is here for a follow up to discuss Carotid Screening.    The patient does not smoke.    Pt is currently taking Coumadin and statin.    The patient states he/she are NOT wearing compression .    Swelling is observed in bilateral legs.    Pt rates pain 0/10 located at .    Pts symptoms include leg cramps, swelling, numbness or weakness and pain with walking at a distance of 150 ft in Bilateral  extremity.

## 2022-08-30 ENCOUNTER — OFFICE VISIT (OUTPATIENT)
Dept: VASCULAR SURGERY | Facility: CLINIC | Age: 77
End: 2022-08-30
Attending: FAMILY MEDICINE
Payer: COMMERCIAL

## 2022-08-30 VITALS — SYSTOLIC BLOOD PRESSURE: 164 MMHG | HEART RATE: 77 BPM | DIASTOLIC BLOOD PRESSURE: 79 MMHG | TEMPERATURE: 96.9 F

## 2022-08-30 DIAGNOSIS — I73.9 PVD (PERIPHERAL VASCULAR DISEASE) (H): ICD-10-CM

## 2022-08-30 DIAGNOSIS — Z48.812 ENCOUNTER FOR SURGICAL AFTERCARE FOLLOWING SURGERY ON THE CIRCULATORY SYSTEM: ICD-10-CM

## 2022-08-30 PROCEDURE — 99214 OFFICE O/P EST MOD 30 MIN: CPT | Performed by: SURGERY

## 2022-08-30 RX ORDER — ATORVASTATIN CALCIUM 80 MG/1
80 TABLET, FILM COATED ORAL DAILY
Qty: 30 TABLET | Refills: 3 | Status: SHIPPED | OUTPATIENT
Start: 2022-08-30 | End: 2023-05-11

## 2022-08-30 ASSESSMENT — PAIN SCALES - GENERAL: PAINLEVEL: NO PAIN (0)

## 2022-08-30 NOTE — NURSING NOTE
"Initial BP (!) 164/79   Pulse 77   Temp 96.9  F (36.1  C) (Tympanic)  Estimated body mass index is 31.57 kg/m  as calculated from the following:    Height as of 5/24/22: 1.778 m (5' 10\").    Weight as of 7/19/22: 99.8 kg (220 lb). .    Velma Sigala LPN on 8/30/2022 at 12:41 PM    "

## 2022-08-30 NOTE — PROGRESS NOTES
VASCULAR SURGERY PROGRESS NOTE   VASCULAR SURGEON: Lilia Stacy MD, RPVI     LOCATION:  [facility]     Antoine Russo   Medical Record #:  [id number]  YOB: 1945  Age:  76 year old     Date of Service: 8/30/2022    PRIMARY CARE PROVIDER: Katie Martino      Reason for visit: PAD    IMPRESSION: 36-year-old male who comes to vascular surgery clinic for follow-up.  Patient has been recently seen by my partner, Dr. Gilbert, for lifestyle limiting claudication, left is worse than right.  Patient still complains of lifestyle in claudication which did not improve.  ABIs are relatively stable, may be slightly improved.  Patient stated he can walk only 100 feet before stopping.  Denies any pain at rest or tissue loss.  Ultrasound suggests possible popliteal or distal SFA disease.  Patient also has concomitant neurogenic bilateral lower extremity pain.    RECOMMENDATION/RISKS: Would recommend proceeding with CTA and runoff.  We will increase his Lipitor to 80 mg.  Continue aspirin patient is relatively active at work and is not able to undergo PT physical therapy.  He would like to proceed with intervention if possible due to limitation for his lifestyle.  Will determine the plan of the CTA is completed.    HPI:  Antoine Russo is a 76 year old male who was seen today for follow-up.  Patient still complaining of significant left-sided claudication (patient denies any pain at rest or nonhealing wounds    REVIEW OF SYSTEMS:    A 12 point ROS was reviewed and is negative except for left leg pain    PHH:    Past Medical History:   Diagnosis Date     Acute kidney injury (H) 04/17/2019     Acute on chronic pancreatitis (H) 01/23/2018     Bacteriuria with pyuria 04/17/2019     C. difficile colitis      Chronic atrial fibrillation (H)     On chronic anticoagulation with coumadin     Colon polyp 08/26/2011    Colonoscopy 8/2011-A sessile polyp was found in the cecum. The polyp was 6 mm  in size. The polyp was removed with a hot snare. Resection and retrieval were complete. A sessile polyp was found in the proximal transverse colon. The polyp was 15 mm in size. The polyp was removed with a hot snare. Resection and retrieval were complete. A sessile polyp was found in the sigmoid colon. 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, N0, M0 carcinoma, underwent transcervical glossectomy, floor of mouth excision, BL neck dissection, adj radiation, and skin flap reconstruction     Recurrent pancreatitis     alcoholic pancreatitis          Past Surgical History:   Procedure Laterality Date     BREAST SURGERY  01/01/2008    right breast mass benign     COLECTOMY SUBTOTAL  2013    With diverting ostomy creation; done for toxic C difficile colitis     COLONOSCOPY      multiple polyps removed     COLONOSCOPY  08/24/2011    TUMOR/POLYP/LESION BY SNARE     COLONOSCOPY  12/17/2012    COLONOSCOPY     COLONOSCOPY  12/18/2012    COLONOSCOPY     DENERVATION OF SPERMATIC CORD MICROSURGICAL Left 05/23/2017    Procedure: DENERVATION OF SPERMATIC CORD MICROSURGICAL;;  Surgeon: Marcio Aggarwal MD;  [facility]     DISSECTION RADICAL NECK BILATERAL  08/02/2012    Procedure: DISSECTION RADICAL NECK BILATERAL;;  Surgeon: Yung Alvares MD;  [facility]: [facility]     ENDOSCOPIC RETROGRADE CHOLANGIOPANCREATOGRAM N/A 05/10/2016    Procedure: COMBINED ENDOSCOPIC RETROGRADE CHOLANGIOPANCREATOGRAPHY, PLACE TUBE/STENT;  Surgeon: Yovanny Beasley MD;  [facility]     ENDOSCOPIC RETROGRADE CHOLANGIOPANCREATOGRAM N/A 03/29/2018    Procedure: ENDOSCOPIC RETROGRADE CHOLANGIOPANCREATOGRAM;  Endoscopic Retrograde Cholangiopancreatogram, Endoscopic Ultrasound, Biliary Sphincterotomy, Biliary and Pancreatic Stent Placement;  Surgeon: Yovanny Beasley MD;  [facility]     ENDOSCOPIC ULTRASOUND UPPER  GASTROINTESTINAL TRACT (GI) N/A 02/03/2016    Procedure: ENDOSCOPIC ULTRASOUND, ESOPHAGOSCOPY / UPPER GASTROINTESTINAL TRACT (GI);  Surgeon: Grabiel Plata MD;  [facility]     ENDOSCOPIC ULTRASOUND UPPER GASTROINTESTINAL TRACT (GI) N/A 03/29/2018    Procedure: ENDOSCOPIC ULTRASOUND, ESOPHAGOSCOPY / UPPER GASTROINTESTINAL TRACT (GI);;  Surgeon: Grabiel Plata MD;  [facility]     ESOPHAGOSCOPY, GASTROSCOPY, DUODENOSCOPY (EGD), COMBINED N/A 02/03/2016    Procedure: COMBINED ENDOSCOPIC ULTRASOUND, ESOPHAGOSCOPY, GASTROSCOPY, DUODENOSCOPY (EGD), FINE NEEDLE ASPIRATE/BIOPSY;  Surgeon: Grabiel Plata MD;  [facility]     ESOPHAGOSCOPY, GASTROSCOPY, DUODENOSCOPY (EGD), COMBINED N/A 06/08/2016    Procedure: COMBINED ESOPHAGOSCOPY, GASTROSCOPY, DUODENOSCOPY (EGD), REMOVE FOREIGN BODY;  Surgeon: Yovanny Beasley MD;  [facility]     ESOPHAGOSCOPY, GASTROSCOPY, DUODENOSCOPY (EGD), COMBINED N/A 04/17/2018    Procedure: COMBINED ESOPHAGOSCOPY, GASTROSCOPY, DUODENOSCOPY (EGD), REMOVE FOREIGN BODY;  EGD with stent removal;  Surgeon: Grabiel Plata MD;  [facility]     EXCISE LESION INTRAORAL  06/14/2012    Procedure: EXCISE LESION INTRAORAL;  Wide Local Excision Floor of Mouth, Direct Laryngoscopy, Bilateral Island's Marsuplization, Split Thickness Skin Graft from right Thigh  Latex Safe;  Surgeon: Gerson Ravi MD;  [facility]     EXCISE LESION INTRAORAL  08/02/2012    Procedure: EXCISE LESION INTRAORAL;  Floor of Mouth Resection, Bilateral Selective Radical Neck Dissection, Tracheostomy, Left Radial Forearm  Free Flap with Alloderm, Nasogastric Feeding Tube Placement,    * Latex Safe*;  Surgeon: Gerson Ravi MD;  [facility]     EXCISE LESION INTRAORAL  12/11/2012    Procedure: EXCISE LESION INTRAORAL;  takedown of oral flap;  Surgeon: Yung Alvares MD;  [facility]     GRAFT FREE VASCULARIZED (LOCATION)  08/02/2012    Procedure: GRAFT FREE VASCULARIZED  (LOCATION);;  Surgeon: Yung Alvares MD;  [facility]     GRAFT SKIN SPLIT THICKNESS FROM EXTREMITY  06/14/2012    Procedure: GRAFT SKIN SPLIT THICKNESS FROM EXTREMITY;;  Surgeon: Gerson Ravi MD;  [facility]     LAPAROSCOPIC ILEOSTOMY TAKEDOWN  06/06/2013    LAPAROSCOPIC ILEOSTOMY TAKEDOWN     LAPAROTOMY EXPLORATORY  12/20/2012    LAPAROTOMY EXPLORATORY with Colectomy     LARYNGOSCOPY  06/14/2012    Procedure: LARYNGOSCOPY;;  Surgeon: Gerson Ravi MD;  [facility]     ORTHOPEDIC SURGERY      ganglian cyst left ankle     PANCREATECTOMY, SPLENECTOMY N/A 03/10/2016    Procedure: PANCREATECTOMY, SPLENECTOMY;  Surgeon: Nael Abel MD;  [facility]     SHOULDER SURGERY  2006, 2008    2006- right rotator cuff, 2008 bone spur on left. Dr. Hdez     VARICOCELECTOMY Left 05/23/2017    Procedure: VARICOCELECTOMY;  Left Varicocele Repair, Denervation of Left Testis;  Surgeon: Marcio Aggarwal MD;  [facility]       ALLERGIES:  Nkda [no known drug allergies]    MEDS:    Current Outpatient Medications:      atorvastatin (LIPITOR) 80 MG tablet, Take 1 tablet (80 mg) by mouth daily, Disp: 30 tablet, Rfl: 3     empagliflozin (JARDIANCE) 10 MG TABS tablet, Take 1 tablet (10 mg) by mouth daily, Disp: 30 tablet, Rfl: 1     insulin NPH (NOVOLIN N FLEXPEN RELION) 100 UNIT/ML injection, Take 40 units of N in the am and 32 units in the pm., Disp: 30 mL, Rfl: 5     insulin pen needle (BD LUIS U/F) 32G X 4 MM miscellaneous, USE PEN NEEDLE ONCE DAILY AS DIRECTED, Disp: 60 each, Rfl: 0     lisinopril (ZESTRIL) 10 MG tablet, Take 1 tablet (10 mg) by mouth daily, Disp: 90 tablet, Rfl: 3     metoprolol succinate ER (TOPROL-XL) 50 MG 24 hr tablet, TAKE 1 & 1/2 (ONE & ONE-HALF) TABLETS BY MOUTH TWICE DAILY (Patient taking differently: Take 75 mg by mouth 2 times daily TAKE 1 & 1/2 (ONE & ONE-HALF) TABLETS BY MOUTH TWICE DAILY), Disp: 270 tablet, Rfl: 3     multivitamin, therapeutic with minerals (THERA-VIT-M) TABS,  Take 1 tablet by mouth daily., Disp: 30 each, Rfl: 1     pregabalin (LYRICA) 150 MG capsule, Take 1 capsule by mouth twice daily, Disp: 60 capsule, Rfl: 5     rosuvastatin (CRESTOR) 10 MG tablet, Take 1 tablet (10 mg) by mouth daily, Disp: 90 tablet, Rfl: 3     tamsulosin (FLOMAX) 0.4 MG capsule, Take 1 capsule by mouth once daily, Disp: 90 capsule, Rfl: 1     vitamin B-12 (CYANOCOBALAMIN) 100 MCG tablet, Take 100 mcg by mouth daily, Disp: , Rfl:      Vitamin D3 (CHOLECALCIFEROL) 25 mcg (1000 units) tablet, Take by mouth daily, Disp: , Rfl:      warfarin ANTICOAGULANT (COUMADIN) 2.5 MG tablet, TAKE 2.5 mg every Tue, Thu, Sat; 1.25 mg all other days OR AS DIRECTED BY ANTICOAGULATION CLINIC., Disp: 70 tablet, Rfl: 1     ASPIRIN NOT PRESCRIBED (INTENTIONAL), Please choose reason not prescribed from choices below. (Patient not taking: Reported on 2022), Disp: , Rfl:      Continuous Blood Gluc Sensor (Delivery HeroSTYLE LISA 14 DAY SENSOR) MISC, USE 1 SENSOR EVERY 14 DAYS (Patient not taking: Reported on 2022), Disp: 6 each, Rfl: 3    SOCIAL HABITS:    History   Smoking Status     Former Smoker     Packs/day: 1.00     Years: 40.00     Types: Cigarettes     Quit date: 2006   Smokeless Tobacco     Never Used     Social History    Substance and Sexual Activity      Alcohol use: Yes      History   Drug Use Unknown       FAMILY HISTORY:    Family History   Problem Relation Age of Onset     Diabetes Sister         onset age 50     Alzheimer Disease Mother          80     Alzheimer Disease Father          85     Diabetes Other         nephew type 1     Diabetes Other      Aneurysm Sister      Anesthesia Reaction No family hx of      Colon Cancer No family hx of      Colon Polyps No family hx of      Crohn's Disease No family hx of      Ulcerative Colitis No family hx of        PE:  BP (!) 164/79   Pulse 77   Temp 96.9  F (36.1  C) (Tympanic)   Wt Readings from Last 1 Encounters:   22 99.8 kg (220 lb)      There is no height or weight on file to calculate BMI.    EXAM:  GENERAL: This is a well-developed 76 year old male who appears his stated age  EYES: Grossly normal.  MOUTH: Buccal mucosa normal   CARDIAC: Normal   CHEST/LUNG: Clear to auscultation bilaterally  GASTROINTESINAL soft nontender nondistended  MUSCULOSKELETAL: Grossly normal and both lower extremities are intact.  HEME/LYMPH: No lymphedema  NEUROLOGIC: Focally intact, Alert and oriented x 3.   PSYCH: appropriate affect  INTEGUMENT: No open lesions or ulcers  Pulse Exam: Monophasic signals present bilaterally.  Palpable femoral pulses appreciated.          DIAGNOSTIC STUDIES:     Images:  US ERIN with PPG wo Exercise Bilateral    Result Date: 8/16/2022  US ERIN WITH PPG W/O EXERCISE BILAT   8/16/2022 9:12 AM HISTORY: Type 2 diabetes mellitus with diabetic polyneuropathy, without long-term current use of insulin (H); Peripheral vascular disease (H) COMPARISON: None. FINDINGS: Right ERIN: DP: 0.81 PT: 0.89. Left ERIN: DP: 0.68 PT: 0.71. Right Digital brachial index: 0.64, 117 mmHg. Left Digital Brachial index: 0.35, 64 mmHg Waveforms: Monophasic in the distal tibial arteries     IMPRESSION: Moderate left worse than right lower extremity arterial insufficiency. This is not significantly changed when compared to prior exam. ERIN CRITERIA: >0.95 Normal 0.90 - 0.94 Mild 0.5 - 0.89 Moderate 0.2 - 0.49 Severe <0.2 Critical SENDY ALCAZAR MD   SYSTEM ID:  [id number]    US Lower Extremity Arterial Duplex Bilateral    Result Date: 8/18/2022  ULTRASOUND BILATERAL LOWER EXTREMITY ARTERIAL DUPLEX  8/16/2022 9:12 AM HISTORY:  76-year-old patient with peripheral arterial disease and type 2 diabetes, request made for evaluation of arterial insufficiency in both lower extremities. COMPARISON: None. TECHNIQUE: Color Doppler and spectral waveform analysis performed throughout the arteries of both lower extremities. FINDINGS: Right lower extremity: Diffuse atherosclerotic  calcification throughout all visible arteries. Common femoral arterial velocity is 175 cm/sec. Profunda femoral artery velocity is 89 cm/sec. SFA is patient with velocities ranging from 68 to 190 cm/sec with predominantly monophasic waveforms. Popliteal artery is 54 cm/sec. Posterior tibial, peroneal, and anterior tibial artery waveforms are monophasic. Left lower extremity: The common femoral, profunda femoral, and superficial femoral arteries are patent with velocities ranging from 53 to 148 cm/sec. The distal SFA is 15 cm/sec, with a prominent arterial branch, likely collateralized artery. Waveforms beyond the distal SFA are decreased in amplitude and monophasic in the popliteal, peroneal, anterior tibial, and posterior tibial arteries.     IMPRESSION: 1. Diffuse atherosclerotic calcification throughout all visible arteries. 2. Clear transition in waveforms at the level of the left distal SFA and popliteal arteries with diminished amplitude monophasic waveforms beyond this location. Suspect a focal hemodynamically significant stenosis/occlusion at this level, though disease likely multifocal. 3. Visible arteries are patent in the right lower extremity, though waveforms are predominantly monophasic throughout. Do not identify a clear stenosis, though with diffuse atherosclerotic plaque suspect multifocal areas of stenoses. BELLE CLARK MD   SYSTEM ID:  [id number]          LABS:      Sodium   Date Value Ref Range Status   07/19/2022 135 133 - 144 mmol/L Final   05/24/2022 136 133 - 144 mmol/L Final   04/15/2022 139 133 - 144 mmol/L Final   02/22/2021 136 133 - 144 mmol/L Final   02/18/2021 138 133 - 144 mmol/L Final   02/17/2021 137 133 - 144 mmol/L Final     Urea Nitrogen   Date Value Ref Range Status   07/19/2022 16 7 - 30 mg/dL Final   05/24/2022 14 7 - 30 mg/dL Final   04/15/2022 17 7 - 30 mg/dL Final   02/22/2021 13 7 - 30 mg/dL Final   02/18/2021 12 7 - 30 mg/dL Final   02/17/2021 13 7 - 30 mg/dL Final      Hemoglobin   Date Value Ref Range Status   07/19/2022 15.8 13.3 - 17.7 g/dL Final   05/24/2022 14.8 13.3 - 17.7 g/dL Final   04/19/2022 15.8 13.3 - 17.7 g/dL Final   02/22/2021 14.0 13.3 - 17.7 g/dL Final   02/18/2021 13.8 13.3 - 17.7 g/dL Final   02/17/2021 13.9 13.3 - 17.7 g/dL Final     Platelet Count   Date Value Ref Range Status   07/19/2022 177 150 - 450 10e3/uL Final   05/24/2022 178 150 - 450 10e3/uL Final   04/19/2022 182 150 - 450 10e3/uL Final   02/22/2021 246 150 - 450 10e9/L Final   02/18/2021 193 150 - 450 10e9/L Final   02/17/2021 192 150 - 450 10e9/L Final     INR   Date Value Ref Range Status   08/19/2022 3.2 (H) 0.9 - 1.1 Final   07/15/2022 2.6 (H) 0.9 - 1.1 Final   06/08/2022 2.2 (H) 0.9 - 1.1 Final   04/15/2022 3.12 (H) 0.85 - 1.15 Final   06/09/2021 2.50 (H) 0.86 - 1.14 Final     Comment:     This test is intended for monitoring Coumadin therapy.  Results are not   accurate in patients with prolonged INR due to factor deficiency.     05/05/2021 2.70 (H) 0.86 - 1.14 Final     Comment:     This test is intended for monitoring Coumadin therapy.  Results are not   accurate in patients with prolonged INR due to factor deficiency.     04/07/2021 2.70 (H) 0.86 - 1.14 Final     Comment:     This test is intended for monitoring Coumadin therapy.  Results are not   accurate in patients with prolonged INR due to factor deficiency.         45 minutes spent on the day of encounter doing chart review, history and exam, documentation, and further activities as noted.         Lilia Stacy MD, [facility]  VASCULAR SURGERY

## 2022-08-30 NOTE — PATIENT INSTRUCTIONS
Adeel Schultz,    Thank you for entrusting your care with us today. After your visit today with MD Lilia Stacy this is the plan that was discussed at your appointment.    We need to do a CTA of your abdomen and legs    Ultrasound of your carotid arteries    Start you on Lipitor 80 mg    I am including additional information on these things and our contact information if you have any questions or concerns.   Please do not hesitate to reach out to us if you felt we did not answer your questions or you are unsure of the treatment plan after your visit today. Our number is [phone].Thank you for trusting us with your care.         Again thank you for your time.     Lisa Garcia RN    Carotid Duplex    Steps for the test are:  You will be asked to lie on a stretcher. It will be okay for you to wear your street clothes. In some situations, you may be asked to change into a gown.    Ultrasound gel, usually warmed for your comfort, will be placed on either side of your neck.    Through the gel, the technician will apply to your neck a small hand-held device that emits sound waves.   When the test is completed, the technician will remove excess gel from your neck. The gel is water-soluble and will not stain your skin or clothes.    How to Prepare:  Eat and take medications as usual.   Wear minimal or no jewelry to ease application and removal of gel.    Risks  There are typically no side effects or complications associated with a carotid duplex ultrasound examination.    What Can I Expect After the Test?  The technician will send the ultrasound images to your vascular surgeon for evaluation. Typically, a report is available in 2-3 days. If anything critical is found, it is standard practice to notify the vascular surgeon immediately.  Reference: https://vascular.org/patient-resources/vascular-tests

## 2022-08-31 ENCOUNTER — LAB (OUTPATIENT)
Dept: LAB | Facility: CLINIC | Age: 77
End: 2022-08-31
Payer: COMMERCIAL

## 2022-08-31 ENCOUNTER — ANTICOAGULATION THERAPY VISIT (OUTPATIENT)
Dept: ANTICOAGULATION | Facility: CLINIC | Age: 77
End: 2022-08-31

## 2022-08-31 DIAGNOSIS — Z79.01 LONG TERM CURRENT USE OF ANTICOAGULANT THERAPY: ICD-10-CM

## 2022-08-31 DIAGNOSIS — I48.20 CHRONIC ATRIAL FIBRILLATION (H): ICD-10-CM

## 2022-08-31 DIAGNOSIS — I48.20 CHRONIC ATRIAL FIBRILLATION (H): Primary | ICD-10-CM

## 2022-08-31 LAB — INR BLD: 2.6 (ref 0.9–1.1)

## 2022-08-31 PROCEDURE — 85610 PROTHROMBIN TIME: CPT

## 2022-08-31 PROCEDURE — 36416 COLLJ CAPILLARY BLOOD SPEC: CPT

## 2022-08-31 NOTE — PROGRESS NOTES
ANTICOAGULATION MANAGEMENT     Antoine Russo 76 year old male is on warfarin with therapeutic INR result. (Goal INR 2.0-3.0)    Recent labs: (last 7 days)     08/31/22  0817   INR 2.6*       ASSESSMENT       Source(s): Chart Review and Patient/Caregiver Call       Warfarin doses taken: Warfarin taken as instructed    Diet: No new diet changes identified    New illness, injury, or hospitalization: No    Medication/supplement changes: None noted    Signs or symptoms of bleeding or clotting: No    Previous INR: Therapeutic last 2(+) visits    Additional findings: None       PLAN     Recommended plan for no diet, medication or health factor changes affecting INR     Dosing Instructions: Continue your current warfarin dose with next INR in 4 weeks       Summary  As of 8/31/2022    Full warfarin instructions:  2.5 mg every Tue, Thu, Sat; 1.25 mg all other days   Next INR check:  9/28/2022             Telephone call with Poli or Saleem who verbalizes understanding and agrees to plan    Lab visit scheduled    Education provided: Please call back if any changes to your diet, medications or how you've been taking warfarin    Plan made per [facility] anticoagulation protocol    Neela English RN  Anticoagulation Clinic  8/31/2022    _______________________________________________________________________     Anticoagulation Episode Summary     Current INR goal:  2.0-3.0   TTR:  93.0 % (1 y)   Target end date:  Indefinite   Send INR reminders to:  [facility]    Indications    Chronic atrial fibrillation (H) [I48.20]  Long term current use of anticoagulant therapy [Z79.01]           Comments:           Anticoagulation Care Providers     Provider Role Specialty Phone number    Katie Martino MD Referring Family Medicine [phone]    Brianna Matute APRN CNP Referring Family Medicine [phone]

## 2022-09-09 ENCOUNTER — VIRTUAL VISIT (OUTPATIENT)
Dept: EDUCATION SERVICES | Facility: CLINIC | Age: 77
End: 2022-09-09
Payer: COMMERCIAL

## 2022-09-09 DIAGNOSIS — E11.40 TYPE 2 DIABETES MELLITUS WITH DIABETIC NEUROPATHY, WITH LONG-TERM CURRENT USE OF INSULIN (H): Primary | ICD-10-CM

## 2022-09-09 DIAGNOSIS — Z79.4 TYPE 2 DIABETES MELLITUS WITH DIABETIC NEUROPATHY, WITH LONG-TERM CURRENT USE OF INSULIN (H): Primary | ICD-10-CM

## 2022-09-09 PROCEDURE — 98967 PH1 ASSMT&MGMT NQHP 11-20: CPT | Mod: 95 | Performed by: DIETITIAN, REGISTERED

## 2022-09-09 NOTE — PROGRESS NOTES
"Type of Service: Telephone Visit    Originating Location (Patient Location): Home  Distant Location (Provider Location): [facility]  Mode of Communication:  Telephone    Telephone Visit Start Time: 8:00  Telephone Visit End Time (telephone visit stop time): 8:13    How would patient like to obtain AVS? Sarah     Diabetes Self-Management Education & Support    Presents for: Individual review    Type of Visit: Telephone Visit    How would patient like to obtain AVS? Sarah    ASSESSMENT:  Poli's BG have improved with additional of 10mg Jardiance. We discussed increasing Jardiance to 25mg today but he declined stating \"if it's not broke don't fix it\", also he is still not sure he wants to be paying $45 per month for medication. He plans to take again this month at the 10mg dose, then will decide if wants to continue taking it. Discussed if he increases Jardiance we would decrease insulin a bit which may save him some money. At this point he wants to continue with current plan. He has f/u with his PCP later next month. No plans to f/u at this time.     Patient's most recent   Lab Results   Component Value Date    A1C 9.2 07/19/2022    A1C 7.1 10/03/2020    is not meeting goal of <8.0    Diabetes knowledge and skills assessment:   Patient is knowledgeable in diabetes management concepts related to: Healthy Eating, Monitoring, Taking Medication and Problem Solving    Continue education with the following diabetes management concepts: Healthy Eating, Being Active, Monitoring, Taking Medication, Problem Solving, Reducing Risks and Healthy Coping    Based on learning assessment above, most appropriate setting for further diabetes education would be: Individual setting.    INTERVENTIONS:    Education provided today on:  AADE Self-Care Behaviors:  Care Plan: Diabetes   Updates made by Felipa Parnell since 9/9/2022 12:00 AM      Problem: HbA1C Not In Goal       Goal: Establish Regular Follow-Ups with " PCP       Task: Discuss with PCP the recommended timing for patient's next follow up visit(s) Completed 9/9/2022   Responsible User: Felipa Parnell      Task: Discuss schedule for PCP visits with patient Completed 9/9/2022   Responsible User: Felipa Parnell      Goal: Get HbA1C Level in Goal       Task: Educate patient on diabetes education self-management topics    Responsible User: Felipa Parnell      Task: Educate patient on benefits of regular glucose monitoring Completed 9/9/2022   Responsible User: Felipa Parnell      Task: Refer patient to appropriate extended care team member, as needed (Medication Therapy Management, Behavioral Health, Physical Therapy, etc.)    Responsible User: Felipa Parnell      Task: Discuss diabetes treatment plan with patient    Responsible User: Felipa Parnell      Problem: Diabetes Self-Management Education Needed to Optimize Self-Care Behaviors       Goal: Understand diabetes pathophysiology and disease progression       Task: Provide education on diabetes pathophysiology and disease progression specfic to patient's diabetes type    Responsible User: Felipa Parnell      Goal: Healthy Eating - follow a healthy eating pattern for diabetes       Task: Provide education on portion control and consistency in amount, composition and timing of food intake Completed 9/9/2022   Responsible User: Felipa Parnell      Task: Provide education on managing carbohydrate intake (carbohydrate counting, plate planning method, etc.) Completed 9/9/2022   Responsible User: Feliap Parnell      Task: Provide education on weight management    Responsible User: Felipa Parnell      Task: Provide education on heart healthy eating    Responsible User: Felipa Parnell      Task: Provide education on eating out    Responsible User: Felipa Parnell      Task: Develop individualized healthy eating plan with patient    Responsible User: Felipa Parnell      Goal: Being Active - get regular physical  activity, working up to at least 150 minutes per week       Task: Provide education on relationship of activity to glucose and precautions to take if at risk for low glucose    Responsible User: Felipa Parnell      Task: Discuss barriers to physical activity with patient    Responsible User: Felipa Parnell      Task: Develop physical activity plan with patient    Responsible User: Felipa Parnell      Task: Explore community resources including walking groups, assistance programs, and home videos    Responsible User: Felipa Parnell      Goal: Monitoring - monitor glucose and ketones as directed    This Visit's Progress: 100%   Note:    I will scan my BG at least 2x per day     Task: Provide education on blood glucose monitoring (purpose, proper technique, frequency, glucose targets, interpreting results, when to use glucose control solution, sharps disposal)    Responsible User: Felipa Parnell      Task: Provide education on continuous glucose monitoring (sensor placement, use of wendi or /reader, understanding glucose trends, alerts and alarms, differences between sensor glucose and blood glucose) Completed 9/9/2022   Responsible User: Felipa Parnell      Task: Provide education on ketone monitoring (when to monitor, frequency, etc.)    Responsible User: Felipa Parnell      Goal: Taking Medication - patient is consistently taking medications as directed       Task: Provide education on action of prescribed medication, including when to take and possible side effects Completed 9/9/2022   Responsible User: Felipa Parnell      Task: Provide education on insulin and injectable diabetes medications, including administration, storage, site selection and rotation for injection sites    Responsible User: Felipa Panrell      Task: Discuss barriers to medication adherence with patient and provide management technique ideas as appropriate Completed 9/9/2022   Responsible User: Felipa Parnell      Task: Provide  education on frequency and refill details of medications    Responsible User: Felipa Parnell      Goal: Problem Solving - know how to prevent and manage short-term diabetes complications       Task: Provide education on high blood glucose - causes, signs/symptoms, prevention and treatment    Responsible User: Felipa Parnell      Task: Provide education on low blood glucose - causes, signs/symptoms, prevention, treatment, carrying a carbohydrate source at all times, and medical identification    Responsible User: Felipa Parnell      Task: Provide education on safe travel with diabetes    Responsible User: Felipa Parnell      Task: Provide education on how to care for diabetes on sick days    Responsible User: Felipa Parnell      Task: Provide education on when to call a health care provider    Responsible User: Felipa Parnell      Goal: Reducing Risks - know how to prevent and treat long-term diabetes complications       Task: Provide education on major complications of diabetes, prevention, early diagnostic measures and treatment of complications    Responsible User: Felipa Parnell      Task: Provide education on recommended care for dental, eye and foot health    Responsible User: Felipa Parnell      Task: Provide education on Hemoglobin A1c - goals and relationship to blood glucose levels    Responsible User: Felipa Parnell      Task: Provide education on recommendations for heart health - lipid levels and goals, blood pressure and goals, and aspirin therapy, if indicated    Responsible User: Felipa Parnell      Task: Provide education on tobacco cessation    Responsible User: Felipa Parnell      Goal: Healthy Coping - use available resources to cope with the challenges of managing diabetes       Task: Discuss recognizing feelings about having diabetes    Responsible User: Felipa Parnell      Task: Provide education on the benefits of making appropriate lifestyle changes    Responsible User: Parmjit  "Felipa      Task: Provide education on benefits of utilizing support systems    Responsible User: Felipa Parnell      Task: Discuss methods for coping with stress    Responsible User: Felipa Parnell      Task: Provide education on when to seek professional counseling    Responsible User: Felipa Parnell          Opportunities for ongoing education and support in diabetes-self management were discussed. Pt verbalized understanding of concepts discussed and recommendations provided today.       Education Materials Provided:  No new materials provided today      PLAN  1. Continue current medications/dosing  2. Continue working on diet  3. Check blood sugars at least 2x per day  4. Follow up with PCP next month.    Follow-up: PRN    See Goals Section for co-developed, patient-stated behavior change goals.  AVS provided to patient today.      SUBJECTIVE / OBJECTIVE:  Presents for: Individual review  Accompanied by: Self  Focus of Visit: Patient Unsure  Diabetes type: Type 2  Date of diagnosis: unsure  Other concerns:: None  Cultural Influences/Ethnic Background:  Choose not to answer      Diabetes Symptoms & Complications:  Weight trend: Stable       Patient Problem List and Family Medical History reviewed for relevant medical history, current medical status, and diabetes risk factors.    Vitals:  There were no vitals taken for this visit.  Estimated body mass index is 31.57 kg/m  as calculated from the following:    Height as of 5/24/22: 1.778 m (5' 10\").    Weight as of 7/19/22: 99.8 kg (220 lb).   Last 3 BP:   BP Readings from Last 3 Encounters:   08/30/22 (!) 164/79   07/19/22 138/82   05/25/22 (!) 156/79       History   Smoking Status     Former Smoker     Packs/day: 1.00     Years: 40.00     Types: Cigarettes     Quit date: 11/24/2006   Smokeless Tobacco     Never Used       Labs:  Lab Results   Component Value Date    A1C 9.2 07/19/2022    A1C 7.1 10/03/2020     Lab Results   Component Value Date     " "07/19/2022     02/22/2021     Lab Results   Component Value Date    LDL 48 01/18/2022    LDL 54 02/22/2021     HDL Cholesterol   Date Value Ref Range Status   02/22/2021 29 (L) >39 mg/dL Final     Direct Measure HDL   Date Value Ref Range Status   01/18/2022 30 (L) >=40 mg/dL Final   ]  GFR Estimate   Date Value Ref Range Status   07/19/2022 90 >60 mL/min/1.73m2 Final     Comment:     Effective December 21, 2021 eGFRcr in adults is calculated using the 2021 CKD-EPI creatinine equation which includes age and gender (Vijaya et al., NEJ, DOI: 10.1056/RRBOxc7417772)   02/22/2021 65 >60 mL/min/[1.73_m2] Final     Comment:     Non  GFR Calc  Starting 12/18/2018, serum creatinine based estimated GFR (eGFR) will be   calculated using the Chronic Kidney Disease Epidemiology Collaboration   (CKD-EPI) equation.       GFR Estimate If Black   Date Value Ref Range Status   02/22/2021 76 >60 mL/min/[1.73_m2] Final     Comment:      GFR Calc  Starting 12/18/2018, serum creatinine based estimated GFR (eGFR) will be   calculated using the Chronic Kidney Disease Epidemiology Collaboration   (CKD-EPI) equation.       Lab Results   Component Value Date    CR 0.88 07/19/2022    CR 1.11 02/22/2021     No results found for: MICROALBUMIN    Healthy Eating:  Healthy Eating Assessed Today: Yes  Breakfast: 8-pizza rolls OR pot stickers + coffee  Lunch: skips  Dinner: 5:30-similar to breakfast OR omelette + whole wheat toast  Other: 9/9-He is still not eating vegetables but is  \"staying away from things that are white and being more mindful\".  Beverages: Coffee, Water, Alcohol (couple beers per day)    Being Active:  Being Active Assessed Today: Yes  Exercise:: Currently not exercising (neuropathy)  Barrier to exercise: Physical limitation    Monitoring:  Monitoring Assessed Today: Yes  Blood Glucose Meter: CGM  Times checking blood sugar at home (number): 2  Times checking blood sugar at home (per): " Day    Glucose data:  Date Breakfast Dinner    Before (however sometimes forgets and checks after) Before (however sometimes forgets and checks after)   9/3 149 124   9/4 184 107   9/5 113 147   9/6 154 106   9/7 172 139   9/8 122 132   9/9 110        Taking Medications:  Diabetes Medication(s)     Insulin       insulin NPH (NOVOLIN N FLEXPEN RELION) 100 UNIT/ML injection    Take 40 units of N in the am and 32 units in the pm.    Sodium-Glucose Co-Transporter 2 (SGLT2) Inhibitors       empagliflozin (JARDIANCE) 10 MG TABS tablet    Take 1 tablet (10 mg) by mouth daily          Taking Medication Assessed Today: Yes  Current Treatments: Insulin Injections, Oral Medication (taken by mouth)  Given by: Patient  Problems taking diabetes medications regularly?: No  Diabetes medication side effects?: No    Problem Solving:  Problem Solving Assessed Today: Yes  Is the patient at risk for hypoglycemia?: Yes  Hypoglycemia Frequency: Rarely       Reducing Risks:  Reducing Risks Assessed Today: No    Healthy Coping:  Healthy Coping Assessed Today: Yes  Emotional response to diabetes: Ready to learn  Stage of change: ACTION (Actively working towards change)  Patient Activation Measure Survey Score:  RIDDHI Score (Last Two) 2/15/2011   RIDDHI Raw Score 41   Activation Score 63.2   RIDDHI Level 3       Felipa Parnell RD, LD, [facility]      Time Spent: 13 minutes  Encounter Type: Individual        Any diabetes medication dose changes were made via the Certified Diabetes Care & Education Protocol in collaboration with the patient's primary care provider. A copy of this encounter was shared with the provider.

## 2022-09-09 NOTE — PATIENT INSTRUCTIONS
PLAN  1. Continue current medications/dosing  2. Continue working on diet  3. Check blood sugars at least 2x per day  4. Follow up with PCP next month.

## 2022-09-09 NOTE — LETTER
"    9/9/2022         RE: Antoine Russo  [address] [address]        Dear Colleague,    Thank you for referring your patient, Antoine Russo, to the [facility]. Please see a copy of my visit note below.    Type of Service: Telephone Visit    Originating Location (Patient Location): Home  Distant Location (Provider Location): [facility]  Mode of Communication:  Telephone    Telephone Visit Start Time: 8:00  Telephone Visit End Time (telephone visit stop time): 8:13    How would patient like to obtain AVS? Sarah     Diabetes Self-Management Education & Support    Presents for: Individual review    Type of Visit: Telephone Visit    How would patient like to obtain AVS? Sarah    ASSESSMENT:  Poli's BG have improved with additional of 10mg Jardiance. We discussed increasing Jardiance to 25mg today but he declined stating \"if it's not broke don't fix it\", also he is still not sure he wants to be paying $45 per month for medication. He plans to take again this month at the 10mg dose, then will decide if wants to continue taking it. Discussed if he increases Jardiance we would decrease insulin a bit which may save him some money. At this point he wants to continue with current plan. He has f/u with his PCP later next month. No plans to f/u at this time.     Patient's most recent   Lab Results   Component Value Date    A1C 9.2 07/19/2022    A1C 7.1 10/03/2020    is not meeting goal of <8.0    Diabetes knowledge and skills assessment:   Patient is knowledgeable in diabetes management concepts related to: Healthy Eating, Monitoring, Taking Medication and Problem Solving    Continue education with the following diabetes management concepts: Healthy Eating, Being Active, Monitoring, Taking Medication, Problem Solving, Reducing Risks and Healthy Coping    Based on learning assessment above, most appropriate setting for further diabetes education would " be: Individual setting.    INTERVENTIONS:    Education provided today on:  AADE Self-Care Behaviors:  Care Plan: Diabetes   Updates made by Felipa Parnell since 9/9/2022 12:00 AM      Problem: HbA1C Not In Goal       Goal: Establish Regular Follow-Ups with PCP       Task: Discuss with PCP the recommended timing for patient's next follow up visit(s) Completed 9/9/2022   Responsible User: Felipa Parnell      Task: Discuss schedule for PCP visits with patient Completed 9/9/2022   Responsible User: Felipa Parnell      Goal: Get HbA1C Level in Goal       Task: Educate patient on diabetes education self-management topics    Responsible User: Felipa Parnell      Task: Educate patient on benefits of regular glucose monitoring Completed 9/9/2022   Responsible User: Felipa Parnell      Task: Refer patient to appropriate extended care team member, as needed (Medication Therapy Management, Behavioral Health, Physical Therapy, etc.)    Responsible User: Felipa Parnell      Task: Discuss diabetes treatment plan with patient    Responsible User: Felipa Parnell      Problem: Diabetes Self-Management Education Needed to Optimize Self-Care Behaviors       Goal: Understand diabetes pathophysiology and disease progression       Task: Provide education on diabetes pathophysiology and disease progression specfic to patient's diabetes type    Responsible User: Felipa Parnell      Goal: Healthy Eating - follow a healthy eating pattern for diabetes       Task: Provide education on portion control and consistency in amount, composition and timing of food intake Completed 9/9/2022   Responsible User: Felipa Parnell      Task: Provide education on managing carbohydrate intake (carbohydrate counting, plate planning method, etc.) Completed 9/9/2022   Responsible User: Felipa Pranell      Task: Provide education on weight management    Responsible User: Felipa Parnell      Task: Provide education on heart healthy eating    Responsible  User: Felipa Parnell      Task: Provide education on eating out    Responsible User: Felipa Parnell      Task: Develop individualized healthy eating plan with patient    Responsible User: Felipa Parnell      Goal: Being Active - get regular physical activity, working up to at least 150 minutes per week       Task: Provide education on relationship of activity to glucose and precautions to take if at risk for low glucose    Responsible User: Felipa Parnell      Task: Discuss barriers to physical activity with patient    Responsible User: Felipa Parnell      Task: Develop physical activity plan with patient    Responsible User: Felipa Parnell      Task: Explore community resources including walking groups, assistance programs, and home videos    Responsible User: Felipa Parnell      Goal: Monitoring - monitor glucose and ketones as directed    This Visit's Progress: 100%   Note:    I will scan my BG at least 2x per day     Task: Provide education on blood glucose monitoring (purpose, proper technique, frequency, glucose targets, interpreting results, when to use glucose control solution, sharps disposal)    Responsible User: Felipa Parnell      Task: Provide education on continuous glucose monitoring (sensor placement, use of wendi or /reader, understanding glucose trends, alerts and alarms, differences between sensor glucose and blood glucose) Completed 9/9/2022   Responsible User: Felipa Parnell      Task: Provide education on ketone monitoring (when to monitor, frequency, etc.)    Responsible User: Felipa Parnell      Goal: Taking Medication - patient is consistently taking medications as directed       Task: Provide education on action of prescribed medication, including when to take and possible side effects Completed 9/9/2022   Responsible User: Felipa Parnell      Task: Provide education on insulin and injectable diabetes medications, including administration, storage, site selection and  rotation for injection sites    Responsible User: Felipa Parnell      Task: Discuss barriers to medication adherence with patient and provide management technique ideas as appropriate Completed 9/9/2022   Responsible User: Felipa Parnell      Task: Provide education on frequency and refill details of medications    Responsible User: Felipa Parnell      Goal: Problem Solving - know how to prevent and manage short-term diabetes complications       Task: Provide education on high blood glucose - causes, signs/symptoms, prevention and treatment    Responsible User: Felipa Parnell      Task: Provide education on low blood glucose - causes, signs/symptoms, prevention, treatment, carrying a carbohydrate source at all times, and medical identification    Responsible User: Felipa Parnell      Task: Provide education on safe travel with diabetes    Responsible User: Felipa Parnell      Task: Provide education on how to care for diabetes on sick days    Responsible User: Felipa Parnell      Task: Provide education on when to call a health care provider    Responsible User: Felipa Parnell      Goal: Reducing Risks - know how to prevent and treat long-term diabetes complications       Task: Provide education on major complications of diabetes, prevention, early diagnostic measures and treatment of complications    Responsible User: Felipa Parnell      Task: Provide education on recommended care for dental, eye and foot health    Responsible User: Felipa Parnell      Task: Provide education on Hemoglobin A1c - goals and relationship to blood glucose levels    Responsible User: Felipa Parnell      Task: Provide education on recommendations for heart health - lipid levels and goals, blood pressure and goals, and aspirin therapy, if indicated    Responsible User: Felipa Parnell      Task: Provide education on tobacco cessation    Responsible User: Felipa Parnell      Goal: Healthy Coping - use available resources to  "cope with the challenges of managing diabetes       Task: Discuss recognizing feelings about having diabetes    Responsible User: Felipa Parnell      Task: Provide education on the benefits of making appropriate lifestyle changes    Responsible User: Felpia Parnell      Task: Provide education on benefits of utilizing support systems    Responsible User: Felipa Parnell      Task: Discuss methods for coping with stress    Responsible User: Felipa Parnell      Task: Provide education on when to seek professional counseling    Responsible User: Felipa Parnell          Opportunities for ongoing education and support in diabetes-self management were discussed. Pt verbalized understanding of concepts discussed and recommendations provided today.       Education Materials Provided:  No new materials provided today      PLAN  1. Continue current medications/dosing  2. Continue working on diet  3. Check blood sugars at least 2x per day  4. Follow up with PCP next month.    Follow-up: PRN    See Goals Section for co-developed, patient-stated behavior change goals.  AVS provided to patient today.      SUBJECTIVE / OBJECTIVE:  Presents for: Individual review  Accompanied by: Self  Focus of Visit: Patient Unsure  Diabetes type: Type 2  Date of diagnosis: unsure  Other concerns:: None  Cultural Influences/Ethnic Background:  Choose not to answer      Diabetes Symptoms & Complications:  Weight trend: Stable       Patient Problem List and Family Medical History reviewed for relevant medical history, current medical status, and diabetes risk factors.    Vitals:  There were no vitals taken for this visit.  Estimated body mass index is 31.57 kg/m  as calculated from the following:    Height as of 5/24/22: 1.778 m (5' 10\").    Weight as of 7/19/22: 99.8 kg (220 lb).   Last 3 BP:   BP Readings from Last 3 Encounters:   08/30/22 (!) 164/79   07/19/22 138/82   05/25/22 (!) 156/79       History   Smoking Status     Former Smoker     " "Packs/day: 1.00     Years: 40.00     Types: Cigarettes     Quit date: 11/24/2006   Smokeless Tobacco     Never Used       Labs:  Lab Results   Component Value Date    A1C 9.2 07/19/2022    A1C 7.1 10/03/2020     Lab Results   Component Value Date     07/19/2022     02/22/2021     Lab Results   Component Value Date    LDL 48 01/18/2022    LDL 54 02/22/2021     HDL Cholesterol   Date Value Ref Range Status   02/22/2021 29 (L) >39 mg/dL Final     Direct Measure HDL   Date Value Ref Range Status   01/18/2022 30 (L) >=40 mg/dL Final   ]  GFR Estimate   Date Value Ref Range Status   07/19/2022 90 >60 mL/min/1.73m2 Final     Comment:     Effective December 21, 2021 eGFRcr in adults is calculated using the 2021 CKD-EPI creatinine equation which includes age and gender (Vijaya sands al., NEJ, DOI: 10.1056/ZKJFxn9374699)   02/22/2021 65 >60 mL/min/[1.73_m2] Final     Comment:     Non  GFR Calc  Starting 12/18/2018, serum creatinine based estimated GFR (eGFR) will be   calculated using the Chronic Kidney Disease Epidemiology Collaboration   (CKD-EPI) equation.       GFR Estimate If Black   Date Value Ref Range Status   02/22/2021 76 >60 mL/min/[1.73_m2] Final     Comment:      GFR Calc  Starting 12/18/2018, serum creatinine based estimated GFR (eGFR) will be   calculated using the Chronic Kidney Disease Epidemiology Collaboration   (CKD-EPI) equation.       Lab Results   Component Value Date    CR 0.88 07/19/2022    CR 1.11 02/22/2021     No results found for: MICROALBUMIN    Healthy Eating:  Healthy Eating Assessed Today: Yes  Breakfast: 8-pizza rolls OR pot stickers + coffee  Lunch: skips  Dinner: 5:30-similar to breakfast OR omelette + whole wheat toast  Other: 9/9-He is still not eating vegetables but is  \"staying away from things that are white and being more mindful\".  Beverages: Coffee, Water, Alcohol (couple beers per day)    Being Active:  Being Active Assessed Today: " Yes  Exercise:: Currently not exercising (neuropathy)  Barrier to exercise: Physical limitation    Monitoring:  Monitoring Assessed Today: Yes  Blood Glucose Meter: CGM  Times checking blood sugar at home (number): 2  Times checking blood sugar at home (per): Day    Glucose data:  Date Breakfast Dinner    Before (however sometimes forgets and checks after) Before (however sometimes forgets and checks after)   9/3 149 124   9/4 184 107   9/5 113 147   9/6 154 106   9/7 172 139   9/8 122 132   9/9 110        Taking Medications:  Diabetes Medication(s)     Insulin       insulin NPH (NOVOLIN N FLEXPEN RELION) 100 UNIT/ML injection    Take 40 units of N in the am and 32 units in the pm.    Sodium-Glucose Co-Transporter 2 (SGLT2) Inhibitors       empagliflozin (JARDIANCE) 10 MG TABS tablet    Take 1 tablet (10 mg) by mouth daily          Taking Medication Assessed Today: Yes  Current Treatments: Insulin Injections, Oral Medication (taken by mouth)  Given by: Patient  Problems taking diabetes medications regularly?: No  Diabetes medication side effects?: No    Problem Solving:  Problem Solving Assessed Today: Yes  Is the patient at risk for hypoglycemia?: Yes  Hypoglycemia Frequency: Rarely       Reducing Risks:  Reducing Risks Assessed Today: No    Healthy Coping:  Healthy Coping Assessed Today: Yes  Emotional response to diabetes: Ready to learn  Stage of change: ACTION (Actively working towards change)  Patient Activation Measure Survey Score:  RIDDHI Score (Last Two) 2/15/2011   RIDDHI Raw Score 41   Activation Score 63.2   RIDDHI Level 3       Felipa Parnell RD, GERALDINE, [facility]      Time Spent: 13 minutes  Encounter Type: Individual        Any diabetes medication dose changes were made via the Certified Diabetes Care & Education Protocol in collaboration with the patient's primary care provider. A copy of this encounter was shared with the provider.

## 2022-09-13 ENCOUNTER — OFFICE VISIT (OUTPATIENT)
Dept: VASCULAR SURGERY | Facility: CLINIC | Age: 77
End: 2022-09-13

## 2022-09-13 ENCOUNTER — HOSPITAL ENCOUNTER (OUTPATIENT)
Dept: CT IMAGING | Facility: CLINIC | Age: 77
Discharge: HOME | End: 2022-09-13
Attending: SURGERY
Payer: COMMERCIAL

## 2022-09-13 ENCOUNTER — HOSPITAL ENCOUNTER (OUTPATIENT)
Dept: ULTRASOUND IMAGING | Facility: CLINIC | Age: 77
Discharge: HOME | End: 2022-09-13
Attending: SURGERY
Payer: COMMERCIAL

## 2022-09-13 VITALS
SYSTOLIC BLOOD PRESSURE: 137 MMHG | HEART RATE: 107 BPM | OXYGEN SATURATION: 91 % | DIASTOLIC BLOOD PRESSURE: 81 MMHG | WEIGHT: 205 LBS | BODY MASS INDEX: 29.41 KG/M2

## 2022-09-13 DIAGNOSIS — I73.9 PAD (PERIPHERAL ARTERY DISEASE) (H): ICD-10-CM

## 2022-09-13 DIAGNOSIS — I70.418 ATHEROSCLEROSIS OF AUTOLOGOUS VEIN BYPASS GRAFT(S) OF THE EXTREMITIES WITH INTERMITTENT CLAUDICATION, OTHER EXTREMITY (H): ICD-10-CM

## 2022-09-13 DIAGNOSIS — I73.9 PVD (PERIPHERAL VASCULAR DISEASE) (H): ICD-10-CM

## 2022-09-13 DIAGNOSIS — I73.9 PERIPHERAL VASCULAR DISEASE (H): Primary | ICD-10-CM

## 2022-09-13 DIAGNOSIS — Z48.812 ENCOUNTER FOR SURGICAL AFTERCARE FOLLOWING SURGERY ON THE CIRCULATORY SYSTEM: ICD-10-CM

## 2022-09-13 LAB
CREAT BLD-MCNC: 1.1 MG/DL (ref 0.7–1.3)
GFR SERPL CREATININE-BSD FRML MDRD: >60 ML/MIN/1.73M2

## 2022-09-13 PROCEDURE — 93880 EXTRACRANIAL BILAT STUDY: CPT

## 2022-09-13 PROCEDURE — 82565 ASSAY OF CREATININE: CPT

## 2022-09-13 PROCEDURE — 250N000009 HC RX 250: Performed by: SURGERY

## 2022-09-13 PROCEDURE — 99214 OFFICE O/P EST MOD 30 MIN: CPT | Performed by: SURGERY

## 2022-09-13 PROCEDURE — 75635 CT ANGIO ABDOMINAL ARTERIES: CPT

## 2022-09-13 PROCEDURE — 250N000011 HC RX IP 250 OP 636: Performed by: SURGERY

## 2022-09-13 RX ORDER — IOPAMIDOL 755 MG/ML
100 INJECTION, SOLUTION INTRAVASCULAR ONCE
Status: COMPLETED | OUTPATIENT
Start: 2022-09-13 | End: 2022-09-13

## 2022-09-13 RX ADMIN — SODIUM CHLORIDE 100 ML: 9 INJECTION, SOLUTION INTRAVENOUS at 12:18

## 2022-09-13 RX ADMIN — IOPAMIDOL 100 ML: 755 INJECTION, SOLUTION INTRAVENOUS at 12:17

## 2022-09-13 NOTE — PROGRESS NOTES
Patient is in clinic today to see MD Mervin Stacy.      Patient is here for a follow up to discuss CT results and PAD.    The patient does not smoke.    Pt is currently taking ASA, Coumadin and statin.    The patient states he/she are NOT wearing compression .    Swelling is observed in Bilateral swelling in ankles and feet.    Pt rates pain 5/10 located at Feet.    Pts symptoms include leg cramps in Bilateral  extremity.    Patients condition is worse.    The provider has been notified that the patient has no complaints.

## 2022-09-13 NOTE — PROGRESS NOTES
VASCULAR SURGERY PROGRESS NOTE   VASCULAR SURGEON: Lilia Stacy MD, RPVI     LOCATION:  [facility]     Antoine Russo   Medical Record #:  [id number]  YOB: 1945  Age:  76 year old     Date of Service: 9/13/2022    PRIMARY CARE PROVIDER: Katie Martino      Reason for visit: Follow-up    IMPRESSION: 76-year-old male who comes to vascular surgery clinic for follow-up.  Patient would like to discuss the results of CT scan.  ABIs are consistent with moderate PAD, left is worse than right.  Patient is still complaining of severe lifestyle in claudication.  CT scan did show multifocal disease involving external iliac artery, moderate disease in the common femoral artery, and multifocal disease and SFA with short segment occlusion.    RECOMMENDATION/RISKS: I think patient is reasonable candidate for left extremity angiogram.  Risk and benefits of this procedure were explained, patient agreed to proceed.    HPI:  Antoine Russo is a 76 year old male who was seen today for follow-up.  Patient is still doing the same without significant new issues.    REVIEW OF SYSTEMS:    A 12 point ROS was reviewed and is negative left leg pain.     PHH:    Past Medical History:   Diagnosis Date     Acute kidney injury (H) 04/17/2019     Acute on chronic pancreatitis (H) 01/23/2018     Bacteriuria with pyuria 04/17/2019     C. difficile colitis      Chronic atrial fibrillation (H)     On chronic anticoagulation with coumadin     Colon polyp 08/26/2011    Colonoscopy 8/2011-A sessile polyp was found in the cecum. The polyp was 6 mm in size. The polyp was removed with a hot snare. Resection and retrieval were complete. A sessile polyp was found in the proximal transverse colon. The polyp was 15 mm in size. The polyp was removed with a hot snare. Resection and retrieval were complete. A sessile polyp was found in the sigmoid colon. 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, N0, M0 carcinoma, underwent transcervical glossectomy, floor of mouth excision, BL neck dissection, adj radiation, and skin flap reconstruction     Recurrent pancreatitis     alcoholic pancreatitis          Past Surgical History:   Procedure Laterality Date     BREAST SURGERY  01/01/2008    right breast mass benign     COLECTOMY SUBTOTAL  2013    With diverting ostomy creation; done for toxic C difficile colitis     COLONOSCOPY      multiple polyps removed     COLONOSCOPY  08/24/2011    TUMOR/POLYP/LESION BY SNARE     COLONOSCOPY  12/17/2012    COLONOSCOPY     COLONOSCOPY  12/18/2012    COLONOSCOPY     DENERVATION OF SPERMATIC CORD MICROSURGICAL Left 05/23/2017    Procedure: DENERVATION OF SPERMATIC CORD MICROSURGICAL;;  Surgeon: Marcio Aggarwal MD;  Location: [facility]     DISSECTION RADICAL NECK BILATERAL  08/02/2012    Procedure: DISSECTION RADICAL NECK BILATERAL;;  Surgeon: Yung Alvares MD;  Location: [facility] OR     ENDOSCOPIC RETROGRADE CHOLANGIOPANCREATOGRAM N/A 05/10/2016    Procedure: COMBINED ENDOSCOPIC RETROGRADE CHOLANGIOPANCREATOGRAPHY, PLACE TUBE/STENT;  Surgeon: Yovanny Beasley MD;  Location: [facility] OR     ENDOSCOPIC RETROGRADE CHOLANGIOPANCREATOGRAM N/A 03/29/2018    Procedure: ENDOSCOPIC RETROGRADE CHOLANGIOPANCREATOGRAM;  Endoscopic Retrograde Cholangiopancreatogram, Endoscopic Ultrasound, Biliary Sphincterotomy, Biliary and Pancreatic Stent Placement;  Surgeon: Yovanny Beasley MD;  Location: [facility]     ENDOSCOPIC ULTRASOUND UPPER GASTROINTESTINAL TRACT (GI) N/A 02/03/2016    Procedure: ENDOSCOPIC ULTRASOUND, ESOPHAGOSCOPY / UPPER GASTROINTESTINAL TRACT (GI);  Surgeon: Grabiel Plata MD;  Location: [facility]     ENDOSCOPIC ULTRASOUND UPPER GASTROINTESTINAL TRACT (GI) N/A 03/29/2018    Procedure: ENDOSCOPIC ULTRASOUND, ESOPHAGOSCOPY / UPPER GASTROINTESTINAL TRACT (GI);;  Surgeon: Grabiel Plata  MD Nader;  Location: [facility] OR     ESOPHAGOSCOPY, GASTROSCOPY, DUODENOSCOPY (EGD), COMBINED N/A 02/03/2016    Procedure: COMBINED ENDOSCOPIC ULTRASOUND, ESOPHAGOSCOPY, GASTROSCOPY, DUODENOSCOPY (EGD), FINE NEEDLE ASPIRATE/BIOPSY;  Surgeon: Grabiel Plata MD;  [facility]: [facility]     ESOPHAGOSCOPY, GASTROSCOPY, DUODENOSCOPY (EGD), COMBINED N/A 06/08/2016    Procedure: COMBINED ESOPHAGOSCOPY, GASTROSCOPY, DUODENOSCOPY (EGD), REMOVE FOREIGN BODY;  Surgeon: Yoavnny Beasley MD;  [facility]: [facility]     ESOPHAGOSCOPY, GASTROSCOPY, DUODENOSCOPY (EGD), COMBINED N/A 04/17/2018    Procedure: COMBINED ESOPHAGOSCOPY, GASTROSCOPY, DUODENOSCOPY (EGD), REMOVE FOREIGN BODY;  EGD with stent removal;  Surgeon: Grabiel Plata MD;  [facility]: [facility]     EXCISE LESION INTRAORAL  06/14/2012    Procedure: EXCISE LESION INTRAORAL;  Wide Local Excision Floor of Mouth, Direct Laryngoscopy, Bilateral Ida's Marsuplization, Split Thickness Skin Graft from right Thigh  Latex Safe;  Surgeon: Gerson Ravi MD;  [facility]     EXCISE LESION INTRAORAL  08/02/2012    Procedure: EXCISE LESION INTRAORAL;  Floor of Mouth Resection, Bilateral Selective Radical Neck Dissection, Tracheostomy, Left Radial Forearm  Free Flap with Alloderm, Nasogastric Feeding Tube Placement,    * Latex Safe*;  Surgeon: Gerson Ravi MD;  [facility]: [facility]     EXCISE LESION INTRAORAL  12/11/2012    Procedure: EXCISE LESION INTRAORAL;  takedown of oral flap;  Surgeon: Yung Alvares MD;  [facility]     GRAFT FREE VASCULARIZED (LOCATION)  08/02/2012    Procedure: GRAFT FREE VASCULARIZED (LOCATION);;  Surgeon: Yung Alvares MD;  [facility]     GRAFT SKIN SPLIT THICKNESS FROM EXTREMITY  06/14/2012    Procedure: GRAFT SKIN SPLIT THICKNESS FROM EXTREMITY;;  Surgeon: Gerson Ravi MD;  [facility]     LAPAROSCOPIC ILEOSTOMY TAKEDOWN  06/06/2013    LAPAROSCOPIC ILEOSTOMY TAKEDOWN     LAPAROTOMY EXPLORATORY  12/20/2012    LAPAROTOMY EXPLORATORY  with Colectomy     LARYNGOSCOPY  06/14/2012    Procedure: LARYNGOSCOPY;;  Surgeon: Gerson Ravi MD;  [facility]: [facility]     ORTHOPEDIC SURGERY      ganglian cyst left ankle     PANCREATECTOMY, SPLENECTOMY N/A 03/10/2016    Procedure: PANCREATECTOMY, SPLENECTOMY;  Surgeon: Nael Abel MD;  Location: [facility]     SHOULDER SURGERY  2006, 2008    2006- right rotator cuff, 2008 bone spur on left. Dr. Hdez     VARICOCELECTOMY Left 05/23/2017    Procedure: VARICOCELECTOMY;  Left Varicocele Repair, Denervation of Left Testis;  Surgeon: Marcio Aggarwal MD;  Location:  OR       ALLERGIES:  Nkda [no known drug allergies]    MEDS:    Current Outpatient Medications:      atorvastatin (LIPITOR) 80 MG tablet, Take 1 tablet (80 mg) by mouth daily, Disp: 30 tablet, Rfl: 3     empagliflozin (JARDIANCE) 10 MG TABS tablet, Take 1 tablet (10 mg) by mouth daily, Disp: 30 tablet, Rfl: 3     insulin NPH (NOVOLIN N FLEXPEN RELION) 100 UNIT/ML injection, Take 40 units of N in the am and 32 units in the pm., Disp: 30 mL, Rfl: 5     insulin pen needle (BD LUIS U/F) 32G X 4 MM miscellaneous, USE PEN NEEDLE ONCE DAILY AS DIRECTED, Disp: 60 each, Rfl: 0     lisinopril (ZESTRIL) 10 MG tablet, Take 1 tablet (10 mg) by mouth daily, Disp: 90 tablet, Rfl: 3     metoprolol succinate ER (TOPROL-XL) 50 MG 24 hr tablet, TAKE 1 & 1/2 (ONE & ONE-HALF) TABLETS BY MOUTH TWICE DAILY (Patient taking differently: Take 75 mg by mouth 2 times daily TAKE 1 & 1/2 (ONE & ONE-HALF) TABLETS BY MOUTH TWICE DAILY), Disp: 270 tablet, Rfl: 3     multivitamin, therapeutic with minerals (THERA-VIT-M) TABS, Take 1 tablet by mouth daily., Disp: 30 each, Rfl: 1     pregabalin (LYRICA) 150 MG capsule, Take 1 capsule by mouth twice daily, Disp: 60 capsule, Rfl: 5     rosuvastatin (CRESTOR) 10 MG tablet, Take 1 tablet (10 mg) by mouth daily, Disp: 90 tablet, Rfl: 3     tamsulosin (FLOMAX) 0.4 MG capsule, Take 1 capsule by mouth once daily, Disp: 90 capsule, Rfl: 1     vitamin  B-12 (CYANOCOBALAMIN) 100 MCG tablet, Take 100 mcg by mouth daily, Disp: , Rfl:      Vitamin D3 (CHOLECALCIFEROL) 25 mcg (1000 units) tablet, Take by mouth daily, Disp: , Rfl:      warfarin ANTICOAGULANT (COUMADIN) 2.5 MG tablet, TAKE 2.5 mg every Tue, Thu, Sat; 1.25 mg all other days OR AS DIRECTED BY ANTICOAGULATION CLINIC., Disp: 70 tablet, Rfl: 1     ASPIRIN NOT PRESCRIBED (INTENTIONAL), Please choose reason not prescribed from choices below. (Patient not taking: No sig reported), Disp: , Rfl:      Continuous Blood Gluc Sensor (FREESTYLE LISA 14 DAY SENSOR) MISC, USE 1 SENSOR EVERY 14 DAYS (Patient not taking: No sig reported), Disp: 6 each, Rfl: 3  No current facility-administered medications for this visit.    SOCIAL HABITS:    History   Smoking Status     Former Smoker     Packs/day: 1.00     Years: 40.00     Types: Cigarettes     Quit date: 2006   Smokeless Tobacco     Never Used     Social History    Substance and Sexual Activity      Alcohol use: Yes      History   Drug Use Unknown       FAMILY HISTORY:    Family History   Problem Relation Age of Onset     Diabetes Sister         onset age 50     Alzheimer Disease Mother          80     Alzheimer Disease Father          85     Diabetes Other         nephew type 1     Diabetes Other      Aneurysm Sister      Anesthesia Reaction No family hx of      Colon Cancer No family hx of      Colon Polyps No family hx of      Crohn's Disease No family hx of      Ulcerative Colitis No family hx of        PE:  There were no vitals taken for this visit.  Wt Readings from Last 1 Encounters:   22 99.8 kg (220 lb)     There is no height or weight on file to calculate BMI.    EXAM:  GENERAL: This is a well-developed 76 year old male who appears his stated age  EYES: Grossly normal.  MOUTH: Buccal mucosa normal   CARDIAC: Normal   CHEST/LUNG: Clear to auscultation bilaterally  GASTROINTESINAL soft nontender nondistended  MUSCULOSKELETAL: Grossly normal  and both lower extremities are intact.  HEME/LYMPH: No lymphedema  NEUROLOGIC: Focally intact, Alert and oriented x 3.   PSYCH: appropriate affect  INTEGUMENT: No open lesions or ulcers            DIAGNOSTIC STUDIES:     Images:  US ERIN with PPG wo Exercise Bilateral    Result Date: 8/16/2022  US ERIN WITH PPG W/O EXERCISE BILAT   8/16/2022 9:12 AM HISTORY: Type 2 diabetes mellitus with diabetic polyneuropathy, without long-term current use of insulin (H); Peripheral vascular disease (H) COMPARISON: None. FINDINGS: Right ERIN: DP: 0.81 PT: 0.89. Left ERIN: DP: 0.68 PT: 0.71. Right Digital brachial index: 0.64, 117 mmHg. Left Digital Brachial index: 0.35, 64 mmHg Waveforms: Monophasic in the distal tibial arteries     IMPRESSION: Moderate left worse than right lower extremity arterial insufficiency. This is not significantly changed when compared to prior exam. ERIN CRITERIA: >0.95 Normal 0.90 - 0.94 Mild 0.5 - 0.89 Moderate 0.2 - 0.49 Severe <0.2 Critical SENDY ALCAZAR MD   SYSTEM ID:  [id number]    US Lower Extremity Arterial Duplex Bilateral    Result Date: 8/18/2022  ULTRASOUND BILATERAL LOWER EXTREMITY ARTERIAL DUPLEX  8/16/2022 9:12 AM HISTORY:  76-year-old patient with peripheral arterial disease and type 2 diabetes, request made for evaluation of arterial insufficiency in both lower extremities. COMPARISON: None. TECHNIQUE: Color Doppler and spectral waveform analysis performed throughout the arteries of both lower extremities. FINDINGS: Right lower extremity: Diffuse atherosclerotic calcification throughout all visible arteries. Common femoral arterial velocity is 175 cm/sec. Profunda femoral artery velocity is 89 cm/sec. SFA is patient with velocities ranging from 68 to 190 cm/sec with predominantly monophasic waveforms. Popliteal artery is 54 cm/sec. Posterior tibial, peroneal, and anterior tibial artery waveforms are monophasic. Left lower extremity: The common femoral, profunda femoral, and superficial  femoral arteries are patent with velocities ranging from 53 to 148 cm/sec. The distal SFA is 15 cm/sec, with a prominent arterial branch, likely collateralized artery. Waveforms beyond the distal SFA are decreased in amplitude and monophasic in the popliteal, peroneal, anterior tibial, and posterior tibial arteries.     IMPRESSION: 1. Diffuse atherosclerotic calcification throughout all visible arteries. 2. Clear transition in waveforms at the level of the left distal SFA and popliteal arteries with diminished amplitude monophasic waveforms beyond this location. Suspect a focal hemodynamically significant stenosis/occlusion at this level, though disease likely multifocal. 3. Visible arteries are patent in the right lower extremity, though waveforms are predominantly monophasic throughout. Do not identify a clear stenosis, though with diffuse atherosclerotic plaque suspect multifocal areas of stenoses. BELLE CLARK MD   SYSTEM ID:  [id number]        LABS:      Sodium   Date Value Ref Range Status   07/19/2022 135 133 - 144 mmol/L Final   05/24/2022 136 133 - 144 mmol/L Final   04/15/2022 139 133 - 144 mmol/L Final   02/22/2021 136 133 - 144 mmol/L Final   02/18/2021 138 133 - 144 mmol/L Final   02/17/2021 137 133 - 144 mmol/L Final     Urea Nitrogen   Date Value Ref Range Status   07/19/2022 16 7 - 30 mg/dL Final   05/24/2022 14 7 - 30 mg/dL Final   04/15/2022 17 7 - 30 mg/dL Final   02/22/2021 13 7 - 30 mg/dL Final   02/18/2021 12 7 - 30 mg/dL Final   02/17/2021 13 7 - 30 mg/dL Final     Hemoglobin   Date Value Ref Range Status   07/19/2022 15.8 13.3 - 17.7 g/dL Final   05/24/2022 14.8 13.3 - 17.7 g/dL Final   04/19/2022 15.8 13.3 - 17.7 g/dL Final   02/22/2021 14.0 13.3 - 17.7 g/dL Final   02/18/2021 13.8 13.3 - 17.7 g/dL Final   02/17/2021 13.9 13.3 - 17.7 g/dL Final     Platelet Count   Date Value Ref Range Status   07/19/2022 177 150 - 450 10e3/uL Final   05/24/2022 178 150 - 450 10e3/uL Final   04/19/2022 182  150 - 450 10e3/uL Final   02/22/2021 246 150 - 450 10e9/L Final   02/18/2021 193 150 - 450 10e9/L Final   02/17/2021 192 150 - 450 10e9/L Final     INR   Date Value Ref Range Status   08/31/2022 2.6 (H) 0.9 - 1.1 Final   08/19/2022 3.2 (H) 0.9 - 1.1 Final   07/15/2022 2.6 (H) 0.9 - 1.1 Final   04/15/2022 3.12 (H) 0.85 - 1.15 Final   06/09/2021 2.50 (H) 0.86 - 1.14 Final     Comment:     This test is intended for monitoring Coumadin therapy.  Results are not   accurate in patients with prolonged INR due to factor deficiency.     05/05/2021 2.70 (H) 0.86 - 1.14 Final     Comment:     This test is intended for monitoring Coumadin therapy.  Results are not   accurate in patients with prolonged INR due to factor deficiency.     04/07/2021 2.70 (H) 0.86 - 1.14 Final     Comment:     This test is intended for monitoring Coumadin therapy.  Results are not   accurate in patients with prolonged INR due to factor deficiency.         45 minutes spent on the day of encounter doing chart review, history and exam, documentation, and further activities as noted.         Lilia Stacy MD, [facility]  VASCULAR SURGERY

## 2022-09-13 NOTE — PATIENT INSTRUCTIONS
Adeel Schultz,    Thank you for entrusting your care with us today. After your visit today with MD Lilia Stacy this is the plan that was discussed at your appointment.    Left leg angiogram at [facility]    See us 2 weeks after the angiogram      I am including additional information on these things and our contact information if you have any questions or concerns.   Please do not hesitate to reach out to us if you felt we did not answer your questions or you are unsure of the treatment plan after your visit today. Our number is [phone].Thank you for trusting us with your care.         Again thank you for your time.     Lisa Garcia, RN          Antoine Russo,    Your visit to [facility] for your procedure is coming soon and we look forward to seeing you! This friendly reminder and pre-procedure checklist will help to ensure your procedure goes smoothly and meets your expectations. At [facility], our goal is to provide you with a great patient experience and to deliver genuine, professional care to every patient.     Please complete all the steps in advance of your visit. If you have any questions about the items listed below, please give our office a call. We can be reached at [phone] or visit our website at [email or web address] for more information.     Procedure: Left Leg Angiogram    Procedure Date :  9/30    Procedure Time :  10 am    Arrival Time: 9 am    Admission Type: Outpatient    Procedure Location: [facility]:  [address] [place] [address] (phone: [phone], Fax: [phone])    Surgeon: MD Lilia RIOS PCR Test: 9/27 at 10 am in [place]    2 week Post Procedure Appointment with ROSELYN Clemens and also ultrasound: 10/18 8 am      If you take blood thinners: SEE SPECIFIC INSTRUCTIONS BELOW    PLEASE DO NOT STOP YOUR ASPIRIN OR PLAVIX UNLESS SPECIFICALLY DIRECTED  BY THE VASCULAR SURGEON TO STOP!  In most cases Vascular providers want you to continue these. This is different from most NON vascular surgeries and may not be well known by your Primary Care Provider  Coumadin: THIS MUST BE HELD 5 DAYS PRIOR TO THE PROCEDURE/ SURGERY. We may need to have you do blood thinner injections during this time. This is called Bridging.   NO BRIDGING IS NEEDED      Prepare for the peripheral angiography as follows:  Do not eat 8 hours before your procedure. You may have clear liquids up to 2 hours before surgery.  If your provider says to take your normal medicines, swallow them with only small sips of water.  Tell your healthcare provider about all medicines you take and any allergies you may have.  Arrange for a family member or friend to drive you home.  If you are on insulin, please take only 1/2 the dose the night before and HOLD the day of the procedure.   If you are on Metformin, please HOLD for 48 hours after the procedure.     Peripheral Angiography    Peripheral angiography is an outpatient procedure that makes a  map  of the vessels (arteries) in your lower body, legs, and arms, using X-ray and dye.This map can show where blood flow may be blocked.    An angiogram is commonly performed under sedation with the use of local anesthesia.    The procedure usually starts with a needle put into the femoral (groin) artery. From one treatment site, areas all over the body can be treated.  After access is established, catheters (thin tubes) and wires are threaded through the arterial system to a specific area of interest or throughout the entire body.  As a contrast agent (iodine dye) is injected, X-ray images are taken to let your provider view the flow of the dye and identify blockages. The surgeon can then choose the best mode of therapy for you - whether during or following the angiogram. This decision depends on your symptoms and the severity and characteristics of the  blockages.  Two common therapies that can be provided during the angiogram are balloon angioplasty and stent placement.                   Angioplasty can be used to open arterial blockages. Guided by X-ray, your provider navigates through the blockage with a wire and introduces a special device equipped with an inflatable balloon. After positioning the balloon device across the blocked portion of the artery, the provider inflates the balloon to expand the artery and compress the blockage. The balloon is then deflated and removed while keeping the wire in place across the area that has been treated. Next, contrast dye is injected to assess the result. Treatment is considered a success if blood flow is improved and less than 30% of the blockage remains. If the vessel is still considerably narrowed, placing a stent may be the next step.    Stents are used to prop open an artery at the site of a narrowing. Stents are generally placed after balloon angioplasty when there is residual narrowing or insufficient blood flow in a treated vessel. Stents are considered a permanent implant and cannot be used if you have a metal allergy. Stents that are used in the leg are constructed of a nickel-titanium alloy (Nitinol), a memory-shaped metal. This alloy has a predetermined size and shape at body temperature and expands to this size and shape after being introduced through a catheter. These stents resist kinking and are flexible so that damage from activities that involve your legs is minimized.  Your procedure may require other techniques to address the problem or plaque.     If surgery is felt to be a better option, your vascular provider will obtain any additional X-ray images needed to plan a surgical bypass of the blocked vessel/s and will then conclude the angiogram.      During the procedure  Here is what to expect:  You may get medicine through an IV (intravenous) line to relax you. You re given an injection to numb the  insertion site. Then, a tiny skin cut (incision) is made near an artery in your groin.  Your provider inserts a thin tube (catheter) through the incision. He or she then threads the catheter into an artery while looking at a video monitor.  Contrast  dye  is injected into the catheter to confirm position. You may feel warmth or pressure in your legs and back. You lie still as X-rays are taken. The catheter is then taken out.  After the procedure  You ll be taken to a recovery area. A healthcare provider will apply pressure to the site for about 10 minutes. Your healthcare provider will tell you how long to lie down and keep the insertion site still. Your healthcare provider will discuss the results with you soon after the procedure.      Angiogram Procedure Discharge Instructions:     1. If you received sedation for your procedure: Do not drive or operate heavy machinery for the rest of the day.  2. Avoid strenuous activity for 72 hours (3 days):                        - Do not lift greater than 10 pounds.                        - Excessive exercise                        - Straining                        - Return to your normal activities as you tolerate after the 3 days restriction  3. Avoid tub baths, Jacuzzis, hot tubs and pools for 72 hours (3 days) or until puncture site is will healed.  4. You may shower beginning tomorrow. Do not scrub puncture site(s) until well healed, pat dry.  5. You can expect to return to work 1-2 days after your procedure - depending on the nature of your profession.  6. It is normal to have some tenderness and minimal swelling at puncture site. A small area of discoloration may be present. Tenderness typically subsides in 24-48 hours. A small knot may also be present at puncture site for 6-8 weeks, this can be a normal part of the healing process.       After the angiogram If you:      1. Experience any bleeding or active swelling from puncture site: Lie down, firmly apply pressure  to puncture site and CALL 9-1-1  2. Fever greater than 101 degrees Fahrenheit.  3. Redness, swelling, warmth to touch, or purulent (yellow/green/foul smelling) drainage from the puncture site.  4. Increasing pain, tenderness or swelling at puncture site OR of arm/leg near puncture site.  5. Feeling weak or faint.  6. Change in color, temperature, or sensation of arm/leg where puncture was made.  7. You can t feel your thrill (pulse at your dialysis fistula site) or it feels weak (If you had fistulogram done).     Call us with any other questions or concerns after your procedure: [phone]      All invasive procedures can have complications. While the risk of an angiogram is low it is not zero. The most common complications are related to the arterial access site.       Risks/ Complications   Bruising is Common  You will likely have bruising (ecchymosis) where the artery was entered.    Pain and Bleeding  Less commonly, patients experience pain and bleeding that may include blood collecting under the skin (hematoma).    Blockage and Leakage   In rare cases, the access artery can become blocked. Infrequently, patients experience persistent leakage of blood where the artery was entered, which can result in the formation of a pseudoaneurysm--a blood-filled sac--that may require further treatment.  Other complications related to an angiogram include:   Allergic reaction to the iodine contrast dye, which can lead to the development of kidney failure.  Very rarely during balloon angioplasty and/or stent placement, part of the arterial blockage can break off (embolism) and travel to more distant arteries. This can worsen blood flow.        Notify our office right away, if you have any changes in your health status, or if you develop a cold, flu, diarrhea, infection, fever or sore throat before your scheduled surgery date. We can be reached at [phone] Monday-Friday 8 am-4:30 pm if you have any questions.   Thank you  "for choosing [facility]      [] PCR COVID-19 test is required within 4 days of surgery  If your test is positive or you fail to get tested, your surgery will be postponed.     [] Contact your insurance regarding coverage  If you would like a Good [place] Estimate for your upcoming procedure at [facility] Location, contact Cost of Care Estimates   Advocates are available Monday through Friday 8am - 5pm   [phone]  You may also submit a request online at [email or web address]  - Complete the secure online form found under \"Services and Procedure Pricing\"   If your procedure is at [facility] please contact the numbers below for Cost of Care Estimates.   - Facility Charge: [phone]    Anesthesiology charge:  [phone]      [] DO NOT BRING FMLA WITH TO SURGERY.  These should be sent to the provider's office by fax to [phone].     [] Day of Surgery  Medications - Take as indicated with sips of water.   Wear comfortable loose fitting clothes. Wear your glasses-Not contacts. Do not wear jewelry and remove body piercing's. Surgery may be cancelled if they are not removed.   You may have 1 family member wait in the lobby during your surgery. Visitor restrictions are subject to change. Please verify with the surgery nurse when they call.   If same day surgery-Have a someone come with you to surgery that can help you understand the surgeon's instructions, drive you home and stay with you overnight the first night.    [] If the community sees a new COVID-19 surge, your procedure may need to be postponed. We will contact you if this happens.    [] You will receive a call from a surgery nurse 1-3 days prior to surgery. They will go over more details with you.       Before Your Procedure or Hospital Admission Testing for COVID-19  Thank you for choosing [facility] for your health care needs. The COVID-19 pandemic is a very challenging time for everyone.   Our " goal is to keep you and our team here at [facility] safe and healthy. We ve taken many steps to make this happen.   For example:   We test and screen our staff, care teams and patients for COVID-19.   Everyone at [facility] must wear a mask and stay 6 feet apart.   We are limiting hospital and clinic visitors.  Before you come in  You must get tested for COVID-19, even if you ve been vaccinated.   If you re going home on the same day as your procedure (surgery):  1 to 2 days before your procedure, take an at-home, rapid antigen test. You can buy these at many pharmacy stores. Or you can order free, at-home tests at [employer]/tests. If you   can t find an at-home, rapid antigen test, please schedule a PCR test with [facility] by calling [facility], or visiting [email or web address]. We   can t accept tests that are more than 4 days old.   If your test is negative, please take a photo of the test. Show the photo to the nurse when you come in for your procedure.  If your test is positive, please see the  If your test shows you have COVID-19  section on this page.    If you re staying overnight in the hospital:  4 days before your procedure, please get a PCR test from a lab.   To schedule a PCR test with [facility], call [facility]. Or, visit [email or web address].    If your test is negative, please ask the testing location to fax your results to us at [phone].  If your test is positive, please see the  If your test shows you have COVID-19  section below.    After your test and before your procedure, please follow these safety guidelines:   Limit trips outside your home.   Limit the number of people you see.   Always wear a face covering outside your home.   Use social distancing. Stay 6 feet away from others whenever you can.   Wash your hands often.    If your test shows you have COVID-19  If your test is positive, please tell your surgeon s  office right away. A positive test means that you have COVID-19.  We ll probably have to postpone your admission, surgery or procedure. Your care team will discuss this with you. After that, we ll let you know what to   do and when you can re-schedule.      If your test shows you DON T have COVID-19.   Even if your test is negative, you can still get COVID-19. It s rare, but sometimes the test result is wrong. You could also catch the virus after taking   the test. There s a very small chance that you could catch COVID-19 in the hospital or surgery center. [facility] has taken many steps to prevent this from happening.   Possible surgery delay like you, we want your surgery to happen when it s scheduled. But sometimes the hospital is so full that it s not safe for you to have your surgery. This is   especially true during the pandemic. Your surgery may need to be re-scheduled at a later date. If this happens, we will call and tell you.   Day of your surgery or procedure   Please come wearing a face covering that covers both your nose and mouth.   When you arrive, we ll ask you some questions to find out if you ve had any exposures to COVID-19, or have any signs of COVID-19.   No matter where you took a test, we ll need to have the results. You can ask your testing location to fax the results to us at [phone]. If you   took a rapid antigen at-home test, you can bring a photo of the results.    Ask your care team if you can have visitors. All visitors must wear face coverings and will be screened for exposure to, or signs of, COVID-19.    The rules for visitors change often, depending on how much the virus is spreading. To learn more, see Visiting a Loved One in the Hospital during   the COVID-19 Outbreak.Please call your care team, hospital or surgery center if you have any questions. We thank you for your understanding and for choosing [facility]   for your care.   Questions and answers  Does it  matter how I get tested for COVID-19?   Yes. If the plan is for you to go home on the same day as your procedure, you can take a rapid antigen, at-home test 1 to 2 days before you come in. If your test is negative, please take a photo of your test. Show it to the nurse when you come to the hospital. If you can t find a rapid antigen, at-home test, you can take a PCR test at a lab instead. If the plan is for you to stay in the hospital after your surgery, you ll need to get a PCR test from a lab 4 days before your procedure. Ask the testing location to fax your results to [phone]. If we don t get your results, we may have to delay or cancel your treatment.  Do I need to get tested at [facility]?  No. However, if you need a PCR test from a lab, we recommend using an [facility] lab. We ll get the results more quickly and easily that way. To schedule a test, please call [phone]. Or, visit [email or web address]      For informational purposes only. Not to replace the advice of your health care provider. Copyright   2020 [facility]. All rights reserved. Clinically reviewed by Infection Prevention and the [facility] COVID-19 Clinical Team.   [id number] - Rev 05/26/22.

## 2022-09-26 DIAGNOSIS — I10 HYPERTENSION, BENIGN ESSENTIAL, GOAL BELOW 140/90: ICD-10-CM

## 2022-09-27 ENCOUNTER — LAB (OUTPATIENT)
Dept: LAB | Facility: CLINIC | Age: 77
End: 2022-09-27
Payer: COMMERCIAL

## 2022-09-27 DIAGNOSIS — Z20.822 ENCOUNTER FOR LABORATORY TESTING FOR COVID-19 VIRUS: ICD-10-CM

## 2022-09-27 DIAGNOSIS — N18.2 CHRONIC KIDNEY DISEASE, STAGE 2 (MILD): Primary | ICD-10-CM

## 2022-09-27 LAB — SARS-COV-2 RNA RESP QL NAA+PROBE: NEGATIVE

## 2022-09-27 PROCEDURE — U0003 INFECTIOUS AGENT DETECTION BY NUCLEIC ACID (DNA OR RNA); SEVERE ACUTE RESPIRATORY SYNDROME CORONAVIRUS 2 (SARS-COV-2) (CORONAVIRUS DISEASE [COVID-19]), AMPLIFIED PROBE TECHNIQUE, MAKING USE OF HIGH THROUGHPUT TECHNOLOGIES AS DESCRIBED BY CMS-2020-01-R: HCPCS

## 2022-09-27 PROCEDURE — U0005 INFEC AGEN DETEC AMPLI PROBE: HCPCS

## 2022-09-27 RX ORDER — METOPROLOL SUCCINATE 50 MG/1
TABLET, EXTENDED RELEASE ORAL
Qty: 270 TABLET | Refills: 1 | Status: SHIPPED | OUTPATIENT
Start: 2022-09-27 | End: 2023-04-18

## 2022-09-27 NOTE — TELEPHONE ENCOUNTER
Subjective:       Patient ID:  Kike Smith is a 63 y.o. male who presents for   Chief Complaint   Patient presents with    Mole     on the left side of neck, grown and able to feel more over past 2+ months, would like it taken off     Hx of multiple nevi, SK and AK's, last seen on 10/20/16.    History of Present Illness: The patient presents with chief complaint of lesion.  Location: left neck  Duration: several months  Signs/Symptoms: growth    Prior treatments: none    The patient denies personal or family history of skin cancer.            Review of Systems   Constitutional: Negative for fever and chills.   Gastrointestinal: Negative for nausea and vomiting.   Skin: Negative for daily sunscreen use, activity-related sunscreen use and recent sunburn.   Hematologic/Lymphatic: Does not bruise/bleed easily.        Objective:    Physical Exam   Constitutional: He appears well-developed and well-nourished. No distress.   Neurological: He is alert and oriented to person, place, and time. He is not disoriented.   Psychiatric: He has a normal mood and affect.   Skin:   Areas Examined (abnormalities noted in diagram):   Scalp / Hair Palpated and Inspected  Head / Face Inspection Performed  Neck Inspection Performed  Chest / Axilla Inspection Performed  Abdomen Inspection Performed  Back Inspection Performed  RUE Inspected  LUE Inspection Performed  Nails and Digits Inspection Performed                   Diagram Legend     Erythematous scaling macule/papule c/w actinic keratosis       Vascular papule c/w angioma      Pigmented verrucoid papule/plaque c/w seborrheic keratosis      Yellow umbilicated papule c/w sebaceous hyperplasia      Irregularly shaped tan macule c/w lentigo     1-2 mm smooth white papules consistent with Milia      Movable subcutaneous cyst with punctum c/w epidermal inclusion cyst      Subcutaneous movable cyst c/w pilar cyst      Firm pink to brown papule c/w dermatofibroma      Pedunculated  Prescription approved per [facility] Refill Protocol.   fleshy papule(s) c/w skin tag(s)      Evenly pigmented macule c/w junctional nevus     Mildly variegated pigmented, slightly irregular-bordered macule c/w mildly atypical nevus      Flesh colored to evenly pigmented papule c/w intradermal nevus       Pink pearly papule/plaque c/w basal cell carcinoma      Erythematous hyperkeratotic cursted plaque c/w SCC      Surgical scar with no sign of skin cancer recurrence      Open and closed comedones      Inflammatory papules and pustules      Verrucoid papule consistent consistent with wart     Erythematous eczematous patches and plaques     Dystrophic onycholytic nail with subungual debris c/w onychomycosis     Umbilicated papule    Erythematous-base heme-crusted tan verrucoid plaque consistent with inflamed seborrheic keratosis     Erythematous Silvery Scaling Plaque c/w Psoriasis     See annotation      Assessment / Plan:        Lentigines  Multiple nevi  Reassurance given.  Discussed ABCDEF of melanoma and changes for patient to look for.  AAD Handout given. Discussed importance of daily use of sunscreen.      Seborrheic keratosis  Reassurance given. Discussed diagnosis and that lesions are benign.  AAD handout given.              Follow-up if symptoms worsen or fail to improve.

## 2022-09-29 ENCOUNTER — TELEPHONE (OUTPATIENT)
Dept: VASCULAR SURGERY | Facility: CLINIC | Age: 77
End: 2022-09-29

## 2022-09-29 RX ORDER — NALOXONE HYDROCHLORIDE 0.4 MG/ML
0.4 INJECTION, SOLUTION INTRAMUSCULAR; INTRAVENOUS; SUBCUTANEOUS
Status: DISCONTINUED | OUTPATIENT
Start: 2022-09-29 | End: 2022-10-01 | Stop reason: HOSPADM

## 2022-09-29 RX ORDER — FLUMAZENIL 0.1 MG/ML
0.2 INJECTION, SOLUTION INTRAVENOUS
Status: DISCONTINUED | OUTPATIENT
Start: 2022-09-29 | End: 2022-10-01 | Stop reason: HOSPADM

## 2022-09-29 RX ORDER — DEXTROSE MONOHYDRATE 25 G/50ML
25-50 INJECTION, SOLUTION INTRAVENOUS
Status: DISCONTINUED | OUTPATIENT
Start: 2022-09-29 | End: 2022-10-01 | Stop reason: HOSPADM

## 2022-09-29 RX ORDER — HEPARIN SODIUM 200 [USP'U]/100ML
1 INJECTION, SOLUTION INTRAVENOUS CONTINUOUS PRN
Status: DISCONTINUED | OUTPATIENT
Start: 2022-09-29 | End: 2022-10-01 | Stop reason: HOSPADM

## 2022-09-29 RX ORDER — FENTANYL CITRATE 50 UG/ML
25-50 INJECTION, SOLUTION INTRAMUSCULAR; INTRAVENOUS EVERY 5 MIN PRN
Status: DISCONTINUED | OUTPATIENT
Start: 2022-09-29 | End: 2022-10-01 | Stop reason: HOSPADM

## 2022-09-29 RX ORDER — NALOXONE HYDROCHLORIDE 0.4 MG/ML
0.2 INJECTION, SOLUTION INTRAMUSCULAR; INTRAVENOUS; SUBCUTANEOUS
Status: DISCONTINUED | OUTPATIENT
Start: 2022-09-29 | End: 2022-10-01 | Stop reason: HOSPADM

## 2022-09-29 RX ORDER — NICOTINE POLACRILEX 4 MG
15-30 LOZENGE BUCCAL
Status: DISCONTINUED | OUTPATIENT
Start: 2022-09-29 | End: 2022-10-01 | Stop reason: HOSPADM

## 2022-09-29 NOTE — TELEPHONE ENCOUNTER
Procedure: Left LE angio    Date: 9/30/22    Surgeon: MD Lilia Stacy    Called patient to review upcoming procedure. Reviewed visitor allowance of 1 person at this time.     Discussed procedure with patients and instructions: Yes    Reviewed Post Procedure Follow up plan and Appts made: Yes - 10/18/22 ultrasounds and f/u with Domonique Singh    Reviewed Covid Test Scheduled/Completed: 9/27/22 - negative    Reviewed Blood Thinners and plan for holding, continuing and/or bridging:  Coumadin: THIS MUST BE HELD 5 DAYS PRIOR TO THE PROCEDURE/ SURGERY. We may need to have you do blood thinner injections during this time. This is called Bridging.   NO BRIDGING IS NEEDED      insulin, please take only 1/2 the dose the night before and HOLD the day of the procedure.       Reviewed Pre Op Appointment Required and Completed: N/A    Reviewed Allergies and if Contrast Allergy-Instructions and plan: No contrast allergy    Reviewed Arrival Time of 0900 and procedure time of 10:00 and location of procedure [facility]:  [facility] [place] [address] (phone: [phone], Fax: [phone])        All questions answered and number provided if any further questions arise or changes in patient status.

## 2022-09-30 ENCOUNTER — HOSPITAL ENCOUNTER (OUTPATIENT)
Dept: INTERVENTIONAL RADIOLOGY/VASCULAR | Facility: HOSPITAL | Age: 77
Discharge: HOME | End: 2022-09-30
Attending: SURGERY | Admitting: SURGERY
Payer: COMMERCIAL

## 2022-09-30 VITALS
HEIGHT: 70 IN | RESPIRATION RATE: 16 BRPM | WEIGHT: 210 LBS | DIASTOLIC BLOOD PRESSURE: 70 MMHG | OXYGEN SATURATION: 92 % | SYSTOLIC BLOOD PRESSURE: 160 MMHG | BODY MASS INDEX: 30.06 KG/M2 | TEMPERATURE: 97.8 F | HEART RATE: 90 BPM

## 2022-09-30 DIAGNOSIS — I70.418 ATHEROSCLEROSIS OF AUTOLOGOUS VEIN BYPASS GRAFT(S) OF THE EXTREMITIES WITH INTERMITTENT CLAUDICATION, OTHER EXTREMITY (H): ICD-10-CM

## 2022-09-30 DIAGNOSIS — I73.9 PVD (PERIPHERAL VASCULAR DISEASE) (H): ICD-10-CM

## 2022-09-30 DIAGNOSIS — I73.9 PERIPHERAL VASCULAR DISEASE (H): ICD-10-CM

## 2022-09-30 LAB
ANION GAP SERPL CALCULATED.3IONS-SCNC: 10 MMOL/L (ref 7–15)
APTT PPP: 30 SECONDS (ref 22–38)
BUN SERPL-MCNC: 11.8 MG/DL (ref 8–23)
CALCIUM SERPL-MCNC: 9.5 MG/DL (ref 8.8–10.2)
CHLORIDE SERPL-SCNC: 100 MMOL/L (ref 98–107)
CREAT SERPL-MCNC: 0.91 MG/DL (ref 0.67–1.17)
DEPRECATED HCO3 PLAS-SCNC: 27 MMOL/L (ref 22–29)
ERYTHROCYTE [DISTWIDTH] IN BLOOD BY AUTOMATED COUNT: 16.4 % (ref 10–15)
GFR SERPL CREATININE-BSD FRML MDRD: 88 ML/MIN/1.73M2
GLUCOSE SERPL-MCNC: 150 MG/DL (ref 70–99)
HCT VFR BLD AUTO: 50.1 % (ref 40–53)
HGB BLD-MCNC: 16.5 G/DL (ref 13.3–17.7)
HOWELL-JOLLY BOD BLD QL SMEAR: PRESENT
INR PPP: 1.17 (ref 0.85–1.15)
MCH RBC QN AUTO: 33.1 PG (ref 26.5–33)
MCHC RBC AUTO-ENTMCNC: 32.9 G/DL (ref 31.5–36.5)
MCV RBC AUTO: 101 FL (ref 78–100)
PLAT MORPH BLD: ABNORMAL
PLATELET # BLD AUTO: 203 10E3/UL (ref 150–450)
POTASSIUM SERPL-SCNC: 4.4 MMOL/L (ref 3.4–5.3)
RBC # BLD AUTO: 4.98 10E6/UL (ref 4.4–5.9)
RBC MORPH BLD: ABNORMAL
SODIUM SERPL-SCNC: 137 MMOL/L (ref 136–145)
WBC # BLD AUTO: 8.7 10E3/UL (ref 4–11)

## 2022-09-30 PROCEDURE — 272N000500 HC NEEDLE CR2

## 2022-09-30 PROCEDURE — 272N000566 HC SHEATH CR3

## 2022-09-30 PROCEDURE — 37224 PR REVASCULARIZE FEM/POP ARTERY, ANGIOPLASTY: CPT | Mod: LT | Performed by: SURGERY

## 2022-09-30 PROCEDURE — 37224 HC REVASCULARIZE FEM-POP ARTERY ANGIOPLASTY: CPT

## 2022-09-30 PROCEDURE — C1760 CLOSURE DEV, VASC: HCPCS

## 2022-09-30 PROCEDURE — C1887 CATHETER, GUIDING: HCPCS

## 2022-09-30 PROCEDURE — C1769 GUIDE WIRE: HCPCS

## 2022-09-30 PROCEDURE — 75774 ARTERY X-RAY EACH VESSEL: CPT | Mod: XU,LT

## 2022-09-30 PROCEDURE — C1725 CATH, TRANSLUMIN NON-LASER: HCPCS

## 2022-09-30 PROCEDURE — 36415 COLL VENOUS BLD VENIPUNCTURE: CPT | Performed by: STUDENT IN AN ORGANIZED HEALTH CARE EDUCATION/TRAINING PROGRAM

## 2022-09-30 PROCEDURE — 255N000002 HC RX 255 OP 636: Performed by: SURGERY

## 2022-09-30 PROCEDURE — 272N000570 HC SHEATH CR7

## 2022-09-30 PROCEDURE — 85730 THROMBOPLASTIN TIME PARTIAL: CPT | Performed by: STUDENT IN AN ORGANIZED HEALTH CARE EDUCATION/TRAINING PROGRAM

## 2022-09-30 PROCEDURE — 99152 MOD SED SAME PHYS/QHP 5/>YRS: CPT | Performed by: SURGERY

## 2022-09-30 PROCEDURE — 250N000011 HC RX IP 250 OP 636: Performed by: STUDENT IN AN ORGANIZED HEALTH CARE EDUCATION/TRAINING PROGRAM

## 2022-09-30 PROCEDURE — C2623 CATH, TRANSLUMIN, DRUG-COAT: HCPCS

## 2022-09-30 PROCEDURE — 85014 HEMATOCRIT: CPT | Performed by: STUDENT IN AN ORGANIZED HEALTH CARE EDUCATION/TRAINING PROGRAM

## 2022-09-30 PROCEDURE — 76937 US GUIDE VASCULAR ACCESS: CPT | Mod: 26 | Performed by: SURGERY

## 2022-09-30 PROCEDURE — 85610 PROTHROMBIN TIME: CPT | Performed by: STUDENT IN AN ORGANIZED HEALTH CARE EDUCATION/TRAINING PROGRAM

## 2022-09-30 PROCEDURE — 80048 BASIC METABOLIC PNL TOTAL CA: CPT | Performed by: STUDENT IN AN ORGANIZED HEALTH CARE EDUCATION/TRAINING PROGRAM

## 2022-09-30 PROCEDURE — 75710 ARTERY X-RAYS ARM/LEG: CPT | Mod: XU,LT

## 2022-09-30 PROCEDURE — 75710 ARTERY X-RAYS ARM/LEG: CPT | Mod: 26 | Performed by: SURGERY

## 2022-09-30 PROCEDURE — 250N000013 HC RX MED GY IP 250 OP 250 PS 637: Performed by: PHYSICIAN ASSISTANT

## 2022-09-30 PROCEDURE — 99152 MOD SED SAME PHYS/QHP 5/>YRS: CPT

## 2022-09-30 PROCEDURE — 272N000302 HC DEVICE INFLATION CR5

## 2022-09-30 PROCEDURE — 250N000009 HC RX 250: Performed by: STUDENT IN AN ORGANIZED HEALTH CARE EDUCATION/TRAINING PROGRAM

## 2022-09-30 RX ORDER — CLOPIDOGREL BISULFATE 75 MG/1
75 TABLET ORAL DAILY
Qty: 90 TABLET | Refills: 1 | Status: SHIPPED | OUTPATIENT
Start: 2022-09-30 | End: 2024-01-15 | Stop reason: ALTCHOICE

## 2022-09-30 RX ORDER — CLOPIDOGREL BISULFATE 75 MG/1
300 TABLET ORAL ONCE
Status: COMPLETED | OUTPATIENT
Start: 2022-09-30 | End: 2022-09-30

## 2022-09-30 RX ORDER — SODIUM CHLORIDE 9 MG/ML
INJECTION, SOLUTION INTRAVENOUS CONTINUOUS
Status: DISCONTINUED | OUTPATIENT
Start: 2022-09-30 | End: 2022-10-01 | Stop reason: HOSPADM

## 2022-09-30 RX ORDER — OXYCODONE HYDROCHLORIDE 5 MG/1
5 TABLET ORAL EVERY 4 HOURS PRN
Status: DISCONTINUED | OUTPATIENT
Start: 2022-09-30 | End: 2022-10-01 | Stop reason: HOSPADM

## 2022-09-30 RX ORDER — ACETAMINOPHEN 325 MG/1
650 TABLET ORAL EVERY 6 HOURS PRN
Status: DISCONTINUED | OUTPATIENT
Start: 2022-09-30 | End: 2022-10-01 | Stop reason: HOSPADM

## 2022-09-30 RX ORDER — HEPARIN SODIUM 1000 [USP'U]/ML
9000 INJECTION, SOLUTION INTRAVENOUS; SUBCUTANEOUS ONCE
Status: COMPLETED | OUTPATIENT
Start: 2022-09-30 | End: 2022-09-30

## 2022-09-30 RX ORDER — IODIXANOL 320 MG/ML
200 INJECTION, SOLUTION INTRAVASCULAR ONCE
Status: COMPLETED | OUTPATIENT
Start: 2022-09-30 | End: 2022-09-30

## 2022-09-30 RX ORDER — LIDOCAINE 40 MG/G
CREAM TOPICAL
Status: DISCONTINUED | OUTPATIENT
Start: 2022-09-30 | End: 2022-10-01 | Stop reason: HOSPADM

## 2022-09-30 RX ADMIN — LIDOCAINE HYDROCHLORIDE 15 ML: 10 INJECTION, SOLUTION INFILTRATION; PERINEURAL at 10:47

## 2022-09-30 RX ADMIN — FENTANYL CITRATE 25 MCG: 50 INJECTION, SOLUTION INTRAMUSCULAR; INTRAVENOUS at 11:29

## 2022-09-30 RX ADMIN — FENTANYL CITRATE 50 MCG: 50 INJECTION, SOLUTION INTRAMUSCULAR; INTRAVENOUS at 10:45

## 2022-09-30 RX ADMIN — HEPARIN SODIUM 9000 UNITS: 1000 INJECTION INTRAVENOUS; SUBCUTANEOUS at 11:08

## 2022-09-30 RX ADMIN — MIDAZOLAM HYDROCHLORIDE 0.5 MG: 1 INJECTION, SOLUTION INTRAMUSCULAR; INTRAVENOUS at 10:54

## 2022-09-30 RX ADMIN — FENTANYL CITRATE 25 MCG: 50 INJECTION, SOLUTION INTRAMUSCULAR; INTRAVENOUS at 11:54

## 2022-09-30 RX ADMIN — MIDAZOLAM HYDROCHLORIDE 0.5 MG: 1 INJECTION, SOLUTION INTRAMUSCULAR; INTRAVENOUS at 11:37

## 2022-09-30 RX ADMIN — CLOPIDOGREL BISULFATE 300 MG: 75 TABLET ORAL at 13:11

## 2022-09-30 RX ADMIN — FENTANYL CITRATE 25 MCG: 50 INJECTION, SOLUTION INTRAMUSCULAR; INTRAVENOUS at 11:44

## 2022-09-30 RX ADMIN — IODIXANOL 100 ML: 320 INJECTION, SOLUTION INTRAVASCULAR at 12:27

## 2022-09-30 RX ADMIN — MIDAZOLAM HYDROCHLORIDE 0.5 MG: 1 INJECTION, SOLUTION INTRAMUSCULAR; INTRAVENOUS at 12:03

## 2022-09-30 RX ADMIN — MIDAZOLAM HYDROCHLORIDE 1 MG: 1 INJECTION, SOLUTION INTRAMUSCULAR; INTRAVENOUS at 10:42

## 2022-09-30 RX ADMIN — MIDAZOLAM HYDROCHLORIDE 0.5 MG: 1 INJECTION, SOLUTION INTRAMUSCULAR; INTRAVENOUS at 11:19

## 2022-09-30 RX ADMIN — FENTANYL CITRATE 25 MCG: 50 INJECTION, SOLUTION INTRAMUSCULAR; INTRAVENOUS at 11:05

## 2022-09-30 NOTE — PRE-PROCEDURE
GENERAL PRE-PROCEDURE:   Procedure:  Left lower extremity angiogram with sedation and possible intervention  Date/Time:  9/30/2022 9:58 AM    Verbal consent obtained?: Yes    Written consent obtained?: Yes    Risks and benefits: Risks, benefits and alternatives were discussed    Consent given by:  Patient  Patient states understanding of procedure being performed: Yes    Patient's understanding of procedure matches consent: Yes    Procedure consent matches procedure scheduled: Yes    Expected level of sedation:  Moderate  Appropriately NPO:  Yes  Mallampati  :  Grade 2- soft palate, base of uvula, tonsillar pillars, and portion of posterior pharyngeal wall visible  Lungs:  Lungs clear with good breath sounds bilaterally  Heart:  Normal heart sounds and rate  History & Physical reviewed:  History and physical reviewed and no updates needed  Statement of review:  I have reviewed the lab findings, diagnostic data, medications, and the plan for sedation

## 2022-09-30 NOTE — IP AVS SNAPSHOT
[facility] Interventional Radiology  [address] [place] [id number]  Phone: [phone]  Fax: [phone]                                      After Visit Summary   9/30/2022    Antoine Russo   MRN: [id number]           After Visit Summary Signature Page    I have received my discharge instructions, and my questions have been answered. I have discussed any challenges I see with this plan with the nurse or doctor.    ..........................................................................................................................................  Patient/Patient Representative Signature      ..........................................................................................................................................  Patient Representative Print Name and Relationship to Patient    ..................................................               ................................................  Date                                   Time    ..........................................................................................................................................  Reviewed by Signature/Title    ...................................................              ..............................................  Date                                               Time          22EPIC Rev 08/18

## 2022-09-30 NOTE — DISCHARGE INSTRUCTIONS
Angiogram Discharge Instructions:    You had an angiogram procedure. An angiogram is a procedure thatuses x-rays to take pictures of your blood vessels. A long, flexible tube or catheter is inserted through the blood stream (through the procedure site) to help deliver contrast (dye) into the arteries so they can be visibleon the x-ray. Angiograms are used to evaluate and treat possible blockages or other disease in the arterial system. Please follow the below instructions after your angiogram, including monitoring of your procedure site.    Care instructions after angiogram procedure:    - If you received sedation for your procedure, do not drive or operate heavy machinery for the rest of the day.    - Do not lift objects greater than 10 poundsfor 2 days following angiogram procedure.    - Avoid excessive exercise and straining for 2 days.    - Avoid tub baths, pools, hot tubs and Jacuzzis for 3 days or until procedure site is well healed.    - Youmay shower beginning tomorrow. Do not scrub procedure site until well healed; pat dry.    - Return to your normal activities as you tolerate after the 2 day restriction.    - You can expect to return to work 1-2days after your procedure - depending on the nature of your profession.    - It is normal to have some tenderness and minimal swelling at procedure puncture site. A small area of discoloration may be present.Tenderness typically subsides in 1-2 days. A small knot may also be present at puncture site for 6-8 weeks. This can be a normal part of the healing process.    Medications and other post-procedure care:    -If you had a blockage opened please make sure to fill your prescription for any new medication, such as Plavix, that you may have been prescribed and take it everyday    - If you have kidney function issues, please makesure to hydrate yourself well for the next two days by drinking lots of fluids to help clear your body of the dye used. If you have heart  problems such as heart failure, this may have to be moderated.    - If you are onMetformin, please do not resume it for 48hrs.    Follow up:    - Follow up with your vascular surgery team at the [facility] vascular center A follow up appointment should already be arranged for you.If you are unsure of your appointment or do not have a follow up appointment in the next 2-3 weeks, please call our office at [phone]. You will need to have an ultrasound 2-3 weeks after your angiogram and should bescheduled at the time of your follow up appt.    Further follow up will be based on ultrasound results. Typical follow up is every 3 months for the first year, then every 6 months to one year thereafter.    seek medical evaluation for:    - If you develop fevers (greater than 101 F (38.3C)).    - If you develop increasing pain, redness, purulent drainage, tenderness, or swelling at procedure site.    If you experience any bleeding from procedure/puncture site: lie down, firmly apply pressure to puncture site and call 911.    - Seek emergent evaluation if you experience any new leg/arm pain, discoloration ornumbness.    Call the vascular center at [phone] with questions/concerns or if you have any of the above symptoms.

## 2022-09-30 NOTE — IR NOTE
Patient walked to bathroom, up to chair. Did verbalize understanding of discharge instructions. Spouse at bedside. Patient tolerated food at this time.

## 2022-10-13 ENCOUNTER — MYC MEDICAL ADVICE (OUTPATIENT)
Dept: ANTICOAGULATION | Facility: CLINIC | Age: 77
End: 2022-10-13

## 2022-10-13 NOTE — TELEPHONE ENCOUNTER
ANTICOAGULATION     Antoine Russo is overdue for INR check.      Mychart reminder sent    Maria Victoria Castaneda RN

## 2022-10-15 ENCOUNTER — HEALTH MAINTENANCE LETTER (OUTPATIENT)
Age: 77
End: 2022-10-15

## 2022-10-18 ENCOUNTER — OFFICE VISIT (OUTPATIENT)
Dept: VASCULAR SURGERY | Facility: CLINIC | Age: 77
End: 2022-10-18
Payer: COMMERCIAL

## 2022-10-18 ENCOUNTER — HOSPITAL ENCOUNTER (OUTPATIENT)
Dept: ULTRASOUND IMAGING | Facility: CLINIC | Age: 77
Discharge: HOME | End: 2022-10-18
Attending: SURGERY
Payer: COMMERCIAL

## 2022-10-18 VITALS — HEART RATE: 80 BPM | OXYGEN SATURATION: 93 % | TEMPERATURE: 97.1 F

## 2022-10-18 DIAGNOSIS — I73.9 PVD (PERIPHERAL VASCULAR DISEASE) (H): ICD-10-CM

## 2022-10-18 DIAGNOSIS — I73.9 PERIPHERAL VASCULAR DISEASE (H): ICD-10-CM

## 2022-10-18 DIAGNOSIS — I73.9 PERIPHERAL VASCULAR DISEASE (H): Primary | ICD-10-CM

## 2022-10-18 DIAGNOSIS — I73.9 PAD (PERIPHERAL ARTERY DISEASE) (H): ICD-10-CM

## 2022-10-18 PROCEDURE — 93926 LOWER EXTREMITY STUDY: CPT | Mod: LT

## 2022-10-18 PROCEDURE — 93922 UPR/L XTREMITY ART 2 LEVELS: CPT

## 2022-10-18 PROCEDURE — 99213 OFFICE O/P EST LOW 20 MIN: CPT | Performed by: PHYSICIAN ASSISTANT

## 2022-10-18 ASSESSMENT — PAIN SCALES - GENERAL: PAINLEVEL: NO PAIN (0)

## 2022-10-18 NOTE — PATIENT INSTRUCTIONS
Adeel Schultz,    Thank you for entrusting your care with us today. After your visit today with ROSELYN Carrizales this is the plan that was discussed at your appointment.    We will have you repeat your ultrasound studies in 1 months time.     Medications: continue atorvastatin, warfarin.- stop your crestor.     If you have any changes in your leg such as increased pain, change in the temperature of your leg, Change in color of your leg or rest pain or pain with walking please call us right away. Do not wait until your next follow up.     I am including additional information on these things and our contact information if you have any questions or concerns.   Please do not hesitate to reach out to us if you felt we did not answer your questions or you are unsure of the treatment plan after your visit today. Our number is [phone].Thank you for trusting us with your care.         Again thank you for your time.     Lisa Garcia RN      Lower Extremity Arterial Ultrasound    Description  Ultrasound examinations are painless and easy for the patient. The vascular laboratory will contain a bed and just two or three pieces of equipment. You will be asked to remove pants or shorts and gowns will be provided. It usually takes about 30 minutes.  The technician will tuck a towel under your underpants in the groin. The gel is water-soluble and will not stain your skin or clothes.  Ultrasound gel, usually warmed for your comfort, will be placed on the inner side of your legs.    Through the gel, the technician will apply to your legs a small hand-held device that emits sound waves.  When the test is completed, the technician will remove excess gel from your legs.    Risks  There are typically no side effects or complications associated with a lower extremity arterial duplex ultrasound.  How to Prepare  Eat and take medications as usual.  There is no preparation required for a lower extremity arterial duplex  ultrasound.  What Can I Expect After the Test?  The technician will send the ultrasound images to your vascular surgeon for evaluation. Typically, a report is available in 2-3 days. If anything critical is found, it is standard practice to notify the vascular surgeon immediately.  Reference: https://vascular.org/patient-resources/vascular-tests

## 2022-10-18 NOTE — PROGRESS NOTES
VASCULAR SURGERY PROGRESS NOTE    LOCATION:  [facility]     Antoine Russo  Medical Record #: [id number]  YOB: 1945  Age: 76 year old     Date of Service: 10/18/2022    PRIMARY CARE PROVIDER: Katie Martino    Reason for visit: PAD follow up s/p LLE angiogram      IMPRESSION: 77 YO male presenting for follow up of peripheral arterial disease status post recent left lower extremity angiogram with successful treatment of SFA disease. ERIN on the left improved to 1.12 with toe pressures of 109. Arterial duplex shows patent left lower extremity vasculature with no evidence of hemodynamically significant stenosis. Denies any claudication, classic rest pain, or nonhealing wounds. Compliant with medications and not smoking.     RECOMMENDATION/RISKS: Continue best medical management with plavix and statin therapy. Of note, patient was on 2 different statin agents, we will discontinue Crestor and continue on high intensity Lipitor. He is also on warfarin for atrial fibrillation. Follow up in 4 weeks with repeat imaging studies.    HPI:  Antoine Russo is a 76 year old male with past medical history significant for hypertension, hyperlipidemia, type 2 diabetes mellitus, coronary artery disease, atrial fibrillation, chronic kidney disease stage II, low back pain, and peripheral arterial disease with lifestyle limiting claudication. Patient was worked up CT scan showing multifocal disease involving external iliac artery, moderate disease in the common femoral artery, and multifocal disease and SFA with short segment occlusion. He underwent left lower extremity angiogram with treatment of SFA disease on 9/30/2022 with Dr. Stacy.     Today, Mr. Russo presents for follow up. He is doing well and denies any cramping in his legs with ambulation or at rest. He continues to experience burning pain in the feet due to his neuropathy; this is chronic and unchanged. He notes some relief  with Lyrica. Mr. Russo does not have any wounds or sores on his lower extremities. Patient is compliant with his medications and quit smoking many years ago. No other concerns.     REVIEW OF SYSTEMS:    A 12 point ROS was reviewed and is negative except for what is listed above in HPI.    PHH:    Past Medical History:   Diagnosis Date     Acute kidney injury (H) 04/17/2019     Acute on chronic pancreatitis (H) 01/23/2018     Bacteriuria with pyuria 04/17/2019     C. difficile colitis      Chronic atrial fibrillation (H)     On chronic anticoagulation with coumadin     Colon polyp 08/26/2011    Colonoscopy 8/2011-A sessile polyp was found in the cecum. The polyp was 6 mm in size. The polyp was removed with a hot snare. Resection and retrieval were complete. A sessile polyp was found in the proximal transverse colon. The polyp was 15 mm in size. The polyp was removed with a hot snare. Resection and retrieval were complete. A sessile polyp was found in the sigmoid colon. 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, N0, M0 carcinoma, underwent transcervical glossectomy, floor of mouth excision, BL neck dissection, adj radiation, and skin flap reconstruction     Recurrent pancreatitis     alcoholic pancreatitis      Past Surgical History:   Procedure Laterality Date     BREAST SURGERY  01/01/2008    right breast mass benign     COLECTOMY SUBTOTAL  2013    With diverting ostomy creation; done for toxic C difficile colitis     COLONOSCOPY      multiple polyps removed     COLONOSCOPY  08/24/2011    TUMOR/POLYP/LESION BY SNARE     COLONOSCOPY  12/17/2012    COLONOSCOPY     COLONOSCOPY  12/18/2012    COLONOSCOPY     DENERVATION OF SPERMATIC CORD MICROSURGICAL Left 05/23/2017    Procedure: DENERVATION OF SPERMATIC CORD MICROSURGICAL;;  Surgeon: Marcio Aggarwal MD;  [facility]: [facility]     DISSECTION RADICAL NECK  BILATERAL  08/02/2012    Procedure: DISSECTION RADICAL NECK BILATERAL;;  Surgeon: Yung Alvares MD;  Location: [facility] OR     ENDOSCOPIC RETROGRADE CHOLANGIOPANCREATOGRAM N/A 05/10/2016    Procedure: COMBINED ENDOSCOPIC RETROGRADE CHOLANGIOPANCREATOGRAPHY, PLACE TUBE/STENT;  Surgeon: Yovanny Beasley MD;  Location: [facility] OR     ENDOSCOPIC RETROGRADE CHOLANGIOPANCREATOGRAM N/A 03/29/2018    Procedure: ENDOSCOPIC RETROGRADE CHOLANGIOPANCREATOGRAM;  Endoscopic Retrograde Cholangiopancreatogram, Endoscopic Ultrasound, Biliary Sphincterotomy, Biliary and Pancreatic Stent Placement;  Surgeon: Yovanny Beasley MD;  Location: [facility] OR     ENDOSCOPIC ULTRASOUND UPPER GASTROINTESTINAL TRACT (GI) N/A 02/03/2016    Procedure: ENDOSCOPIC ULTRASOUND, ESOPHAGOSCOPY / UPPER GASTROINTESTINAL TRACT (GI);  Surgeon: Grabiel Plata MD;  [facility]: [facility] OR     ENDOSCOPIC ULTRASOUND UPPER GASTROINTESTINAL TRACT (GI) N/A 03/29/2018    Procedure: ENDOSCOPIC ULTRASOUND, ESOPHAGOSCOPY / UPPER GASTROINTESTINAL TRACT (GI);;  Surgeon: Grabiel Plata MD;  [facility]: [facility]     ESOPHAGOSCOPY, GASTROSCOPY, DUODENOSCOPY (EGD), COMBINED N/A 02/03/2016    Procedure: COMBINED ENDOSCOPIC ULTRASOUND, ESOPHAGOSCOPY, GASTROSCOPY, DUODENOSCOPY (EGD), FINE NEEDLE ASPIRATE/BIOPSY;  Surgeon: Grabiel Plata MD;  [facility]     ESOPHAGOSCOPY, GASTROSCOPY, DUODENOSCOPY (EGD), COMBINED N/A 06/08/2016    Procedure: COMBINED ESOPHAGOSCOPY, GASTROSCOPY, DUODENOSCOPY (EGD), REMOVE FOREIGN BODY;  Surgeon: Yovanny Beasley MD;  [facility]     ESOPHAGOSCOPY, GASTROSCOPY, DUODENOSCOPY (EGD), COMBINED N/A 04/17/2018    Procedure: COMBINED ESOPHAGOSCOPY, GASTROSCOPY, DUODENOSCOPY (EGD), REMOVE FOREIGN BODY;  EGD with stent removal;  Surgeon: Grabiel Plata MD;  [facility]     EXCISE LESION INTRAORAL  06/14/2012    Procedure: EXCISE LESION INTRAORAL;  Wide Local Excision Floor of Mouth, Direct Laryngoscopy, Bilateral  Ida's Marsuplization, Split Thickness Skin Graft from right Thigh  Latex Safe;  Surgeon: Gerson Ravi MD;  [facility]     EXCISE LESION INTRAORAL  08/02/2012    Procedure: EXCISE LESION INTRAORAL;  Floor of Mouth Resection, Bilateral Selective Radical Neck Dissection, Tracheostomy, Left Radial Forearm  Free Flap with Alloderm, Nasogastric Feeding Tube Placement,    * Latex Safe*;  Surgeon: Gerson Ravi MD;  [facility]     EXCISE LESION INTRAORAL  12/11/2012    Procedure: EXCISE LESION INTRAORAL;  takedown of oral flap;  Surgeon: Yung Alvares MD;  [facility]     GRAFT FREE VASCULARIZED (LOCATION)  08/02/2012    Procedure: GRAFT FREE VASCULARIZED (LOCATION);;  Surgeon: Yung Alvares MD;  [facility]     GRAFT SKIN SPLIT THICKNESS FROM EXTREMITY  06/14/2012    Procedure: GRAFT SKIN SPLIT THICKNESS FROM EXTREMITY;;  Surgeon: Gerson Ravi MD;  [facility]     LAPAROSCOPIC ILEOSTOMY TAKEDOWN  06/06/2013    LAPAROSCOPIC ILEOSTOMY TAKEDOWN     LAPAROTOMY EXPLORATORY  12/20/2012    LAPAROTOMY EXPLORATORY with Colectomy     LARYNGOSCOPY  06/14/2012    Procedure: LARYNGOSCOPY;;  Surgeon: Gerson Ravi MD;  [facility]     ORTHOPEDIC SURGERY      ganglian cyst left ankle     PANCREATECTOMY, SPLENECTOMY N/A 03/10/2016    Procedure: PANCREATECTOMY, SPLENECTOMY;  Surgeon: Nael Abel MD;  [facility]: [facility]     SHOULDER SURGERY  2006, 2008    2006- right rotator cuff, 2008 bone spur on left. Dr. Hdez     VARICOCELECTOMY Left 05/23/2017    Procedure: VARICOCELECTOMY;  Left Varicocele Repair, Denervation of Left Testis;  Surgeon: Marcio Aggarwal MD;  [facility]: [facility]     ALLERGIES:  Nkda [no known drug allergies]    MEDS:    Current Outpatient Medications:      atorvastatin (LIPITOR) 80 MG tablet, Take 1 tablet (80 mg) by mouth daily, Disp: 30 tablet, Rfl: 3     clopidogrel (PLAVIX) 75 MG tablet, Take 1 tablet (75 mg) by mouth daily Start taking medication the day after the procedure.,  Disp: 90 tablet, Rfl: 1     empagliflozin (JARDIANCE) 10 MG TABS tablet, Take 1 tablet (10 mg) by mouth daily, Disp: 30 tablet, Rfl: 3     insulin NPH (NOVOLIN N FLEXPEN RELION) 100 UNIT/ML injection, Take 40 units of N in the am and 32 units in the pm., Disp: 30 mL, Rfl: 5     insulin pen needle (BD LUIS U/F) 32G X 4 MM miscellaneous, USE PEN NEEDLE ONCE DAILY AS DIRECTED, Disp: 60 each, Rfl: 0     lisinopril (ZESTRIL) 10 MG tablet, Take 1 tablet (10 mg) by mouth daily, Disp: 90 tablet, Rfl: 3     metoprolol succinate ER (TOPROL XL) 50 MG 24 hr tablet, TAKE 1 & 1/2 (ONE & ONE-HALF) TABLETS BY MOUTH TWICE DAILY, Disp: 270 tablet, Rfl: 1     multivitamin, therapeutic with minerals (THERA-VIT-M) TABS, Take 1 tablet by mouth daily., Disp: 30 each, Rfl: 1     pregabalin (LYRICA) 150 MG capsule, Take 1 capsule by mouth twice daily, Disp: 60 capsule, Rfl: 5     rosuvastatin (CRESTOR) 10 MG tablet, Take 1 tablet (10 mg) by mouth daily, Disp: 90 tablet, Rfl: 3     tamsulosin (FLOMAX) 0.4 MG capsule, Take 1 capsule by mouth once daily, Disp: 90 capsule, Rfl: 1     vitamin B-12 (CYANOCOBALAMIN) 100 MCG tablet, Take 100 mcg by mouth daily, Disp: , Rfl:      Vitamin D3 (CHOLECALCIFEROL) 25 mcg (1000 units) tablet, Take by mouth daily, Disp: , Rfl:      warfarin ANTICOAGULANT (COUMADIN) 2.5 MG tablet, TAKE 2.5 mg every Tue, Thu, Sat; 1.25 mg all other days OR AS DIRECTED BY ANTICOAGULATION CLINIC., Disp: 70 tablet, Rfl: 1     ASPIRIN NOT PRESCRIBED (INTENTIONAL), Please choose reason not prescribed from choices below. (Patient not taking: No sig reported), Disp: , Rfl:      Continuous Blood Gluc Sensor (FREESTYLE LISA 14 DAY SENSOR) MISC, USE 1 SENSOR EVERY 14 DAYS (Patient not taking: No sig reported), Disp: 6 each, Rfl: 3    SOCIAL HABITS:    History   Smoking Status     Former     Packs/day: 1.00     Years: 40.00     Types: Cigarettes     Quit date: 11/24/2006   Smokeless Tobacco     Never     Social History    Substance and  Sexual Activity      Alcohol use: Yes      History   Drug Use Unknown     FAMILY HISTORY:    Family History   Problem Relation Age of Onset     Diabetes Sister         onset age 50     Alzheimer Disease Mother          80     Alzheimer Disease Father          85     Diabetes Other         nephew type 1     Diabetes Other      Aneurysm Sister      Anesthesia Reaction No family hx of      Colon Cancer No family hx of      Colon Polyps No family hx of      Crohn's Disease No family hx of      Ulcerative Colitis No family hx of      PE:  Pulse 80   Temp 97.1  F (36.2  C) (Tympanic)   SpO2 93%   Wt Readings from Last 1 Encounters:   22 95.3 kg (210 lb)     There is no height or weight on file to calculate BMI.    EXAM:  GENERAL: well-developed 76 year old male who appears his stated age  CARDIAC: normal   CHEST/LUNG: normal respiratory effort   MUSCULOSKELETAL: grossly normal and both lower extremities are intact, no lower extremity edema  NEUROLOGIC: focally intact, alert and oriented x 3  PSYCH: appropriate affect    DIAGNOSTIC STUDIES:     Images:    ERIN and LLE Arterial Duplex    I personally reviewed the images and my interpretation is improved ERIN of 1.12 and toe pressure of 109 on the left. Duplex shows patent vasculature with no hemodynamically significant stenosis.    LABS:      Sodium   Date Value Ref Range Status   2022 137 136 - 145 mmol/L Final   2022 135 133 - 144 mmol/L Final   2022 136 133 - 144 mmol/L Final   2021 136 133 - 144 mmol/L Final   2021 138 133 - 144 mmol/L Final   2021 137 133 - 144 mmol/L Final     Urea Nitrogen   Date Value Ref Range Status   2022 11.8 8.0 - 23.0 mg/dL Final   2022 16 7 - 30 mg/dL Final   2022 14 7 - 30 mg/dL Final   04/15/2022 17 7 - 30 mg/dL Final   2021 13 7 - 30 mg/dL Final   2021 12 7 - 30 mg/dL Final   2021 13 7 - 30 mg/dL Final     Hemoglobin   Date Value Ref Range Status    09/30/2022 16.5 13.3 - 17.7 g/dL Final   07/19/2022 15.8 13.3 - 17.7 g/dL Final   05/24/2022 14.8 13.3 - 17.7 g/dL Final   02/22/2021 14.0 13.3 - 17.7 g/dL Final   02/18/2021 13.8 13.3 - 17.7 g/dL Final   02/17/2021 13.9 13.3 - 17.7 g/dL Final     Platelet Count   Date Value Ref Range Status   09/30/2022 203 150 - 450 10e3/uL Final   07/19/2022 177 150 - 450 10e3/uL Final   05/24/2022 178 150 - 450 10e3/uL Final   02/22/2021 246 150 - 450 10e9/L Final   02/18/2021 193 150 - 450 10e9/L Final   02/17/2021 192 150 - 450 10e9/L Final     INR   Date Value Ref Range Status   09/30/2022 1.17 (H) 0.85 - 1.15 Final   08/31/2022 2.6 (H) 0.9 - 1.1 Final   08/19/2022 3.2 (H) 0.9 - 1.1 Final   07/15/2022 2.6 (H) 0.9 - 1.1 Final   04/15/2022 3.12 (H) 0.85 - 1.15 Final   06/09/2021 2.50 (H) 0.86 - 1.14 Final     Comment:     This test is intended for monitoring Coumadin therapy.  Results are not   accurate in patients with prolonged INR due to factor deficiency.     05/05/2021 2.70 (H) 0.86 - 1.14 Final     Comment:     This test is intended for monitoring Coumadin therapy.  Results are not   accurate in patients with prolonged INR due to factor deficiency.     04/07/2021 2.70 (H) 0.86 - 1.14 Final     Comment:     This test is intended for monitoring Coumadin therapy.  Results are not   accurate in patients with prolonged INR due to factor deficiency.       25 minutes spent on the day of encounter doing chart review, history and exam, documentation, and further activities as noted.     Domonique Carrizales PA-C  VASCULAR SURGERY

## 2022-10-20 ENCOUNTER — OFFICE VISIT (OUTPATIENT)
Dept: FAMILY MEDICINE | Facility: CLINIC | Age: 77
End: 2022-10-20
Payer: COMMERCIAL

## 2022-10-20 ENCOUNTER — TELEPHONE (OUTPATIENT)
Dept: FAMILY MEDICINE | Facility: CLINIC | Age: 77
End: 2022-10-20

## 2022-10-20 ENCOUNTER — ANTICOAGULATION THERAPY VISIT (OUTPATIENT)
Dept: ANTICOAGULATION | Facility: CLINIC | Age: 77
End: 2022-10-20

## 2022-10-20 VITALS
WEIGHT: 212 LBS | TEMPERATURE: 97 F | BODY MASS INDEX: 30.42 KG/M2 | SYSTOLIC BLOOD PRESSURE: 139 MMHG | OXYGEN SATURATION: 96 % | RESPIRATION RATE: 24 BRPM | DIASTOLIC BLOOD PRESSURE: 82 MMHG

## 2022-10-20 DIAGNOSIS — E11.40 TYPE 2 DIABETES MELLITUS WITH DIABETIC NEUROPATHY, WITH LONG-TERM CURRENT USE OF INSULIN (H): Primary | ICD-10-CM

## 2022-10-20 DIAGNOSIS — I48.20 CHRONIC ATRIAL FIBRILLATION (H): ICD-10-CM

## 2022-10-20 DIAGNOSIS — N18.2 CHRONIC KIDNEY DISEASE, STAGE 2 (MILD): ICD-10-CM

## 2022-10-20 DIAGNOSIS — I73.9 PVD (PERIPHERAL VASCULAR DISEASE) (H): ICD-10-CM

## 2022-10-20 DIAGNOSIS — E78.5 HYPERLIPIDEMIA LDL GOAL <100: ICD-10-CM

## 2022-10-20 DIAGNOSIS — Z79.01 LONG TERM CURRENT USE OF ANTICOAGULANT THERAPY: ICD-10-CM

## 2022-10-20 DIAGNOSIS — I48.20 CHRONIC ATRIAL FIBRILLATION (H): Primary | Chronic | ICD-10-CM

## 2022-10-20 DIAGNOSIS — Z79.4 TYPE 2 DIABETES MELLITUS WITH DIABETIC NEUROPATHY, WITH LONG-TERM CURRENT USE OF INSULIN (H): Primary | ICD-10-CM

## 2022-10-20 DIAGNOSIS — Z79.01 LONG TERM CURRENT USE OF ANTICOAGULANT THERAPY: Chronic | ICD-10-CM

## 2022-10-20 DIAGNOSIS — I10 HYPERTENSION, BENIGN ESSENTIAL, GOAL BELOW 140/90: ICD-10-CM

## 2022-10-20 LAB
ALBUMIN SERPL BCG-MCNC: 3.7 G/DL (ref 3.5–5.2)
ALP SERPL-CCNC: 74 U/L (ref 40–129)
ALT SERPL W P-5'-P-CCNC: 22 U/L (ref 10–50)
ANION GAP SERPL CALCULATED.3IONS-SCNC: 11 MMOL/L (ref 7–15)
AST SERPL W P-5'-P-CCNC: 24 U/L (ref 10–50)
BASOPHILS # BLD AUTO: 0 10E3/UL (ref 0–0.2)
BASOPHILS NFR BLD AUTO: 0 %
BILIRUB SERPL-MCNC: 0.8 MG/DL
BUN SERPL-MCNC: 16.5 MG/DL (ref 8–23)
CALCIUM SERPL-MCNC: 9.3 MG/DL (ref 8.8–10.2)
CHLORIDE SERPL-SCNC: 101 MMOL/L (ref 98–107)
CHOLEST SERPL-MCNC: 137 MG/DL
CREAT SERPL-MCNC: 1.03 MG/DL (ref 0.67–1.17)
CREAT UR-MCNC: 90.7 MG/DL
DEPRECATED HCO3 PLAS-SCNC: 27 MMOL/L (ref 22–29)
EOSINOPHIL # BLD AUTO: 0.2 10E3/UL (ref 0–0.7)
EOSINOPHIL NFR BLD AUTO: 2 %
ERYTHROCYTE [DISTWIDTH] IN BLOOD BY AUTOMATED COUNT: 16.4 % (ref 10–15)
GFR SERPL CREATININE-BSD FRML MDRD: 76 ML/MIN/1.73M2
GLUCOSE SERPL-MCNC: 135 MG/DL (ref 70–99)
HBA1C MFR BLD: 8.2 % (ref 0–5.6)
HCT VFR BLD AUTO: 47.9 % (ref 40–53)
HDLC SERPL-MCNC: 36 MG/DL
HGB BLD-MCNC: 15.9 G/DL (ref 13.3–17.7)
IMM GRANULOCYTES # BLD: 0 10E3/UL
IMM GRANULOCYTES NFR BLD: 0 %
INR BLD: 2.1 (ref 0.9–1.1)
LDLC SERPL CALC-MCNC: 67 MG/DL
LYMPHOCYTES # BLD AUTO: 1.5 10E3/UL (ref 0.8–5.3)
LYMPHOCYTES NFR BLD AUTO: 19 %
MCH RBC QN AUTO: 33 PG (ref 26.5–33)
MCHC RBC AUTO-ENTMCNC: 33.2 G/DL (ref 31.5–36.5)
MCV RBC AUTO: 99 FL (ref 78–100)
MICROALBUMIN UR-MCNC: 470.2 MG/L
MICROALBUMIN/CREAT UR: 518.41 MG/G CR (ref 0–17)
MONOCYTES # BLD AUTO: 0.7 10E3/UL (ref 0–1.3)
MONOCYTES NFR BLD AUTO: 9 %
NEUTROPHILS # BLD AUTO: 5.5 10E3/UL (ref 1.6–8.3)
NEUTROPHILS NFR BLD AUTO: 69 %
NONHDLC SERPL-MCNC: 101 MG/DL
PLATELET # BLD AUTO: 223 10E3/UL (ref 150–450)
POTASSIUM SERPL-SCNC: 4.6 MMOL/L (ref 3.4–5.3)
PROT SERPL-MCNC: 6.8 G/DL (ref 6.4–8.3)
RBC # BLD AUTO: 4.82 10E6/UL (ref 4.4–5.9)
SODIUM SERPL-SCNC: 139 MMOL/L (ref 136–145)
TRIGL SERPL-MCNC: 170 MG/DL
TSH SERPL DL<=0.005 MIU/L-ACNC: 1.48 UIU/ML (ref 0.3–4.2)
WBC # BLD AUTO: 8 10E3/UL (ref 4–11)

## 2022-10-20 PROCEDURE — 99214 OFFICE O/P EST MOD 30 MIN: CPT | Performed by: FAMILY MEDICINE

## 2022-10-20 PROCEDURE — 36415 COLL VENOUS BLD VENIPUNCTURE: CPT | Performed by: FAMILY MEDICINE

## 2022-10-20 PROCEDURE — 84443 ASSAY THYROID STIM HORMONE: CPT | Performed by: FAMILY MEDICINE

## 2022-10-20 PROCEDURE — 85025 COMPLETE CBC W/AUTO DIFF WBC: CPT | Performed by: FAMILY MEDICINE

## 2022-10-20 PROCEDURE — 80061 LIPID PANEL: CPT | Performed by: FAMILY MEDICINE

## 2022-10-20 PROCEDURE — 85610 PROTHROMBIN TIME: CPT | Performed by: FAMILY MEDICINE

## 2022-10-20 PROCEDURE — 80053 COMPREHEN METABOLIC PANEL: CPT | Performed by: FAMILY MEDICINE

## 2022-10-20 PROCEDURE — 82043 UR ALBUMIN QUANTITATIVE: CPT | Performed by: FAMILY MEDICINE

## 2022-10-20 PROCEDURE — 83036 HEMOGLOBIN GLYCOSYLATED A1C: CPT | Performed by: FAMILY MEDICINE

## 2022-10-20 ASSESSMENT — PAIN SCALES - GENERAL: PAINLEVEL: NO PAIN (0)

## 2022-10-20 NOTE — PROGRESS NOTES
Voicemail left for patient to return call to Anticoagulation Clinic - [phone]. Dosing instructions not given to patient.  Madhavi Sanders RN on 10/20/2022 at 8:14 AM

## 2022-10-20 NOTE — TELEPHONE ENCOUNTER
Writer spoke with patient. See [facility] encounter 10/20/22.    Maria Victoria Castaneda RN, BSN  [facility] Anticoagulation [facility]  [phone]

## 2022-10-20 NOTE — TELEPHONE ENCOUNTER
Patient called back. The Number on pt's chart rang with no answer. Please call pt back  [phone]. Patient can be reached anytime and its ok to leave a detailed msg.    Thanks

## 2022-10-20 NOTE — PROGRESS NOTES
Assessment & Plan     (E11.40,  Z79.4) Type 2 diabetes mellitus with diabetic neuropathy, with long-term current use of insulin (H)  (primary encounter diagnosis)  Comment: uncertain control  Plan: Hemoglobin A1c, Comprehensive metabolic panel         (BMP + Alb, Alk Phos, ALT, AST, Total. Bili,         TP), CBC with platelets and differential, TSH         with free T4 reflex        Adjust insulin as needed    (I48.20) Chronic atrial fibrillation (H)  Comment: stable  Plan: INR        Continue metoprolol and warfarin  Followed by cardiology    (E78.5) Hyperlipidemia LDL goal <100  Comment: on statin  Plan: Lipid panel reflex to direct LDL Fasting        On high dose    (I73.9) PVD (peripheral vascular disease) (H)  Comment: recent successful intervention  Plan: continue Plavix, followed by vascular    (I10) Hypertension, benign essential, goal below 140/90  Comment: controlled  Plan: Comprehensive metabolic panel (BMP + Alb, Alk         Phos, ALT, AST, Total. Bili, TP), CBC with         platelets and differential        Continue lisinopril and metoprolol    (Z79.01) Long term current use of anticoagulant therapy  Comment: for afib  Plan: INR        Followed by INR clinic                Blood sugar testing frequency justification:  Uncontrolled diabetes      Return in about 3 months (around 1/20/2023) for diabetes.    Katie Yoder MD  [facility]    Terri Monge is a 76 year old, presenting for the following health issues:  Diabetes      History of Present Illness       Diabetes:   He presents for follow up of diabetes.  He is checking home blood glucose two times daily. He checks blood glucose before meals and at bedtime.  Blood glucose is never over 200 and never under 70. He is aware of hypoglycemia symptoms including shakiness. He has no concerns regarding his diabetes at this time.  He is having numbness in feet and burning in feet.         He eats 0-1 servings of  fruits and vegetables daily.He consumes 0 sweetened beverage(s) daily.He exercises with enough effort to increase his heart rate 10 to 19 minutes per day.  He exercises with enough effort to increase his heart rate 3 or less days per week.   He is taking medications regularly.        Diabetes Follow-up       How often are you checking your blood sugar? Two times daily    Blood sugar testing frequency justification:      What time of day are you checking your blood sugars (select all that apply)?  Before and after meals    Have you had any blood sugars above 200?  No    Have you had any blood sugars below 70?  No    What symptoms do you notice when your blood sugar is low?  None    What concerns do you have today about your diabetes? None     Do you have any of these symptoms? (Select all that apply)  Numbness in feet and Burning in feet     On insulin NPH 40 units am, 32 units pm          Lab Results   Component Value Date    A1C 9.2 07/19/2022    A1C 9.1 04/19/2022    A1C 9.3 01/18/2022    A1C 8.6 10/14/2021    A1C 8.5 07/13/2021    A1C 7.1 10/03/2020    A1C 6.3 05/12/2020    A1C 6.5 02/04/2020    A1C 9.0 10/17/2019    A1C 9.7 07/18/2019           Hyperlipidemia Follow-Up       Are you regularly taking any medication or supplement to lower your cholesterol?   Yes- Crestor    Are you having muscle aches or other side effects that you think could be caused by your cholesterol lowering medication?  No     Recent Labs   Lab Test 01/18/22  0657 02/22/21  0721 01/18/16  0843 04/14/15  0853   CHOL 116 107   < > 144   HDL 30* 29*   < > 27*   LDL 48 54   < > 76   TRIG 191* 121   < > 206*   CHOLHDLRATIO  --   --   --  5.3*    < > = values in this interval not displayed.      hypertension   BP Readings from Last 6 Encounters:   10/20/22 139/82   09/30/22 (!) 160/70   09/13/22 137/81   08/30/22 (!) 164/79   07/19/22 138/82   05/25/22 (!) 156/79      On metoprolol, lisinopril     PVD  Established in [place], saw them  10/16/22  On Plavix  Had successful treatment of SFA disease    Neuropathy  On Lyrica, not sure if helps    Review of Systems   ROS: 5 point ROS negative except as noted above in HPI, including Gen., Resp., CV, GI &  system review.       Objective    /82 (BP Location: Right arm)   Temp 97  F (36.1  C) (Tympanic)   Resp 24   Wt 96.2 kg (212 lb)   SpO2 96%   BMI 30.42 kg/m    Body mass index is 30.42 kg/m .  Physical Exam   GENERAL: healthy, alert and no distress  NECK: no adenopathy, no asymmetry, masses, or scars and thyroid normal to palpation  RESP: lungs clear to auscultation - no rales, rhonchi or wheezes  CV: irregularly irregular rhythm and no murmur, click or rub  ABDOMEN: soft, nontender, no hepatosplenomegaly, no masses and bowel sounds normal, multiple scars  MS: no gross musculoskeletal defects noted, no edema

## 2022-10-20 NOTE — PROGRESS NOTES
ANTICOAGULATION MANAGEMENT     Antoine Russo 76 year old male is on warfarin with therapeutic INR result. (Goal INR 2.0-3.0)    Recent labs: (last 7 days)     10/20/22  0736   INR 2.1*       ASSESSMENT       Source(s): Chart Review and Patient/Caregiver Call       Warfarin doses taken: Warfarin taken as instructed    Diet: No new diet changes identified    New illness, injury, or hospitalization: No    Medication/supplement changes: None noted    Signs or symptoms of bleeding or clotting: No    Previous INR: Therapeutic last visit; previously outside of goal range    Additional findings: Patient had a procedure (LLE angiogram) on 9/30/22 in which he held warfarin x 5 days no bride. [facility] was not involved or aware of that hold/procedure. Patient confirms he has been back on his usual dose since that time.       PLAN     Recommended plan for no diet, medication or health factor changes affecting INR     Dosing Instructions: Continue your current warfarin dose with next INR in 6 weeks       Summary  As of 10/20/2022    Full warfarin instructions:  2.5 mg every Tue, Thu, Sat; 1.25 mg all other days; Starting 10/20/2022   Next INR check:  12/2/2022             Telephone call with Poli or Saleem who verbalizes understanding and agrees to plan    Lab visit scheduled    Education provided:     Contact [phone]  with any changes, questions or concerns.     Plan made per [facility] anticoagulation protocol    Maria Victoria Castaneda RN  Anticoagulation Clinic  10/20/2022    _______________________________________________________________________     Anticoagulation Episode Summary     Current INR goal:  2.0-3.0   TTR:  83.3 % (1 y)   Target end date:  Indefinite   Send INR reminders to:  [facility]    Indications    Chronic atrial fibrillation (H) [I48.20]  Long term current use of anticoagulant therapy [Z79.01]           Comments:           Anticoagulation Care Providers     Provider Role Specialty Phone number     Katie Martino MD Referring Family Medicine [phone]    Brianna Matute APRN [facility] Referring Family Medicine [phone]

## 2022-10-22 ENCOUNTER — HOSPITAL ENCOUNTER (EMERGENCY)
Facility: CLINIC | Age: 77
Discharge: HOME | DRG: 377 | End: 2022-10-22
Attending: EMERGENCY MEDICINE | Admitting: EMERGENCY MEDICINE
Payer: COMMERCIAL

## 2022-10-22 VITALS
WEIGHT: 200 LBS | RESPIRATION RATE: 18 BRPM | DIASTOLIC BLOOD PRESSURE: 87 MMHG | SYSTOLIC BLOOD PRESSURE: 139 MMHG | BODY MASS INDEX: 28.63 KG/M2 | HEART RATE: 81 BPM | TEMPERATURE: 97.5 F | OXYGEN SATURATION: 92 % | HEIGHT: 70 IN

## 2022-10-22 DIAGNOSIS — K85.90 ACUTE PANCREATITIS, UNSPECIFIED COMPLICATION STATUS, UNSPECIFIED PANCREATITIS TYPE: ICD-10-CM

## 2022-10-22 DIAGNOSIS — R10.12 LEFT UPPER QUADRANT ABDOMINAL PAIN: ICD-10-CM

## 2022-10-22 LAB
ALBUMIN SERPL BCG-MCNC: 3.5 G/DL (ref 3.5–5.2)
ALP SERPL-CCNC: 73 U/L (ref 40–129)
ALT SERPL W P-5'-P-CCNC: 24 U/L (ref 10–50)
ANION GAP SERPL CALCULATED.3IONS-SCNC: 13 MMOL/L (ref 7–15)
AST SERPL W P-5'-P-CCNC: 43 U/L (ref 10–50)
BASOPHILS # BLD MANUAL: 0 10E3/UL (ref 0–0.2)
BASOPHILS NFR BLD MANUAL: 0 %
BILIRUB DIRECT SERPL-MCNC: <0.2 MG/DL (ref 0–0.3)
BILIRUB SERPL-MCNC: 0.9 MG/DL
BUN SERPL-MCNC: 20.9 MG/DL (ref 8–23)
CALCIUM SERPL-MCNC: 9.4 MG/DL (ref 8.8–10.2)
CHLORIDE SERPL-SCNC: 101 MMOL/L (ref 98–107)
CREAT SERPL-MCNC: 0.87 MG/DL (ref 0.67–1.17)
DEPRECATED HCO3 PLAS-SCNC: 22 MMOL/L (ref 22–29)
EOSINOPHIL # BLD MANUAL: 0.3 10E3/UL (ref 0–0.7)
EOSINOPHIL NFR BLD MANUAL: 2 %
ERYTHROCYTE [DISTWIDTH] IN BLOOD BY AUTOMATED COUNT: 17 % (ref 10–15)
GFR SERPL CREATININE-BSD FRML MDRD: >90 ML/MIN/1.73M2
GLUCOSE SERPL-MCNC: 145 MG/DL (ref 70–99)
HCT VFR BLD AUTO: 49.2 % (ref 40–53)
HGB BLD-MCNC: 16 G/DL (ref 13.3–17.7)
HOWELL-JOLLY BOD BLD QL SMEAR: PRESENT
LIPASE SERPL-CCNC: 190 U/L (ref 13–60)
LYMPHOCYTES # BLD MANUAL: 0.9 10E3/UL (ref 0.8–5.3)
LYMPHOCYTES NFR BLD MANUAL: 7 %
MCH RBC QN AUTO: 32.6 PG (ref 26.5–33)
MCHC RBC AUTO-ENTMCNC: 32.5 G/DL (ref 31.5–36.5)
MCV RBC AUTO: 100 FL (ref 78–100)
MONOCYTES # BLD MANUAL: 1.5 10E3/UL (ref 0–1.3)
MONOCYTES NFR BLD MANUAL: 11 %
NEUTROPHILS # BLD MANUAL: 10.6 10E3/UL (ref 1.6–8.3)
NEUTROPHILS NFR BLD MANUAL: 80 %
PLAT MORPH BLD: ABNORMAL
PLATELET # BLD AUTO: 197 10E3/UL (ref 150–450)
POLYCHROMASIA BLD QL SMEAR: SLIGHT
POTASSIUM SERPL-SCNC: 5.1 MMOL/L (ref 3.4–5.3)
PROT SERPL-MCNC: 7.4 G/DL (ref 6.4–8.3)
RBC # BLD AUTO: 4.91 10E6/UL (ref 4.4–5.9)
RBC MORPH BLD: ABNORMAL
SODIUM SERPL-SCNC: 136 MMOL/L (ref 136–145)
WBC # BLD AUTO: 13.3 10E3/UL (ref 4–11)

## 2022-10-22 PROCEDURE — 83690 ASSAY OF LIPASE: CPT | Performed by: EMERGENCY MEDICINE

## 2022-10-22 PROCEDURE — 85027 COMPLETE CBC AUTOMATED: CPT | Performed by: EMERGENCY MEDICINE

## 2022-10-22 PROCEDURE — 96375 TX/PRO/DX INJ NEW DRUG ADDON: CPT | Performed by: EMERGENCY MEDICINE

## 2022-10-22 PROCEDURE — 82310 ASSAY OF CALCIUM: CPT | Performed by: EMERGENCY MEDICINE

## 2022-10-22 PROCEDURE — 99285 EMERGENCY DEPT VISIT HI MDM: CPT | Mod: 25 | Performed by: EMERGENCY MEDICINE

## 2022-10-22 PROCEDURE — 250N000011 HC RX IP 250 OP 636: Performed by: EMERGENCY MEDICINE

## 2022-10-22 PROCEDURE — 82248 BILIRUBIN DIRECT: CPT | Performed by: EMERGENCY MEDICINE

## 2022-10-22 PROCEDURE — 96374 THER/PROPH/DIAG INJ IV PUSH: CPT | Performed by: EMERGENCY MEDICINE

## 2022-10-22 PROCEDURE — 36415 COLL VENOUS BLD VENIPUNCTURE: CPT | Performed by: EMERGENCY MEDICINE

## 2022-10-22 PROCEDURE — 250N000013 HC RX MED GY IP 250 OP 250 PS 637: Performed by: EMERGENCY MEDICINE

## 2022-10-22 PROCEDURE — 250N000009 HC RX 250: Performed by: EMERGENCY MEDICINE

## 2022-10-22 PROCEDURE — 99285 EMERGENCY DEPT VISIT HI MDM: CPT | Performed by: EMERGENCY MEDICINE

## 2022-10-22 PROCEDURE — 85007 BL SMEAR W/DIFF WBC COUNT: CPT | Performed by: EMERGENCY MEDICINE

## 2022-10-22 RX ORDER — MORPHINE SULFATE 4 MG/ML
4 INJECTION, SOLUTION INTRAMUSCULAR; INTRAVENOUS ONCE
Status: COMPLETED | OUTPATIENT
Start: 2022-10-22 | End: 2022-10-22

## 2022-10-22 RX ORDER — KETOROLAC TROMETHAMINE 15 MG/ML
15 INJECTION, SOLUTION INTRAMUSCULAR; INTRAVENOUS ONCE
Status: COMPLETED | OUTPATIENT
Start: 2022-10-22 | End: 2022-10-22

## 2022-10-22 RX ORDER — OXYCODONE AND ACETAMINOPHEN 5; 325 MG/1; MG/1
1 TABLET ORAL EVERY 6 HOURS PRN
Qty: 6 TABLET | Refills: 0 | Status: ON HOLD | OUTPATIENT
Start: 2022-10-22 | End: 2022-11-03

## 2022-10-22 RX ADMIN — MORPHINE SULFATE 4 MG: 4 INJECTION, SOLUTION INTRAMUSCULAR; INTRAVENOUS at 22:33

## 2022-10-22 RX ADMIN — LIDOCAINE HYDROCHLORIDE: 20 SOLUTION ORAL; TOPICAL at 22:37

## 2022-10-22 RX ADMIN — KETOROLAC TROMETHAMINE 15 MG: 15 INJECTION, SOLUTION INTRAMUSCULAR; INTRAVENOUS at 22:32

## 2022-10-22 ASSESSMENT — ENCOUNTER SYMPTOMS
FEVER: 0
SHORTNESS OF BREATH: 0
ABDOMINAL PAIN: 1

## 2022-10-22 ASSESSMENT — ACTIVITIES OF DAILY LIVING (ADL): ADLS_ACUITY_SCORE: 41

## 2022-10-23 NOTE — DISCHARGE INSTRUCTIONS
Tylenol as needed for pain.    You can also try Tums, Maalox.    Percocet as needed for severe pain

## 2022-10-23 NOTE — ED PROVIDER NOTES
History     Chief Complaint   Patient presents with     Abdominal Pain     HPI  Antoine Russo is a 76 year old male who has past medical history significant for recurrent episodes of pancreatitis, alcohol abuse, stage II chronic kidney disease, presenting the emergency department with concerns regarding abdominal pain, located in the left upper quadrant, worsened during the day today, with associated nausea, no episodes of vomiting.  Patient states he drinks a couple of beers daily.  He will get occasional episodes of pancreatitis, and this current pain, moderate in severity, located in the left upper quadrant, feels similar to his previous episodes of pancreatitis.  Denies any fever.  No pain elsewhere in the abdomen.  No recent change in food, liquid, or alcohol intake.  No prior history of gallbladder surgery, however has had multiple other abdominal surgeries.  No change in bowel movements.    Allergies:  Allergies   Allergen Reactions     Nkda [No Known Drug Allergies]        Problem List:    Patient Active Problem List    Diagnosis Date Noted     Anticipated difficulty with intubation 05/23/2017     Priority: High     Class: Chronic     Difficult two hands mask ventilation, intubated multiple times asleep with video laryngoscope. H/o tongue cancer surgery.        Chronic kidney disease, stage 2 (mild) 01/20/2022     Priority: Medium     Alcohol abuse 02/16/2021     Priority: Medium     Leukocytosis 02/16/2021     Priority: Medium     Nausea with vomiting 02/16/2021     Priority: Medium     Dehydration 02/16/2021     Priority: Medium     Encounter for screening laboratory testing for COVID-19 virus 02/16/2021     Priority: Medium     Acute recurrent pancreatitis 10/17/2020     Priority: Medium     Acute gastritis without hemorrhage, unspecified gastritis type 10/17/2020     Priority: Medium     Added automatically from request for surgery [id number]       Acute gastritis 10/03/2020     Priority: Medium      Peripheral polyneuropathy 05/12/2020     Priority: Medium     Chronic low back pain with sciatica, sciatica laterality unspecified, unspecified back pain laterality 05/12/2020     Priority: Medium     Alcohol dependence, uncomplicated (H) 02/04/2020     Priority: Medium     Acute right-sided low back pain with left-sided sciatica 02/04/2020     Priority: Medium     Spinal stenosis of lumbar region, unspecified whether neurogenic claudication present 02/04/2020     Priority: Medium     Pancreatic pseudocyst 04/18/2019     Priority: Medium     Acute pancreatitis 10/21/2018     Priority: Medium     Long term current use of anticoagulant therapy 04/05/2018     Priority: Medium     Recurrent pancreatitis 03/30/2018     Priority: Medium     Chronic atrial fibrillation (H) 01/23/2018     Priority: Medium     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 Please refer if Care Coordination services are needed.    Presenting Problem Signs and Symptoms Treatment Plan   Questions or concerns   during clinic hours   I will call my clinic directly:  [address] [address]  [phone].   Questions or concerns outside clinic hours   I will call the 24 hour nurse line at   [phone] or [facility].   Need to schedule an appointment   I will call the 24 hour scheduling team at [phone] or my clinic directly at [phone].   Same day treatment     I will call my  clinic first, nurse line if after hours, urgent care and express care if needed.   Clinic care coordination services (regular clinic hours)   I will call a clinic care coordinator directly:     Shelia Emanuel RN [facility]  [phone]    Brianna Cristobal :    [phone]    Or call my clinic at [phone] and ask to speak with care coordination.   Crisis Services: Behavioral or Mental Health  [facility] 24 Hour Phone Line  [phone]    [facility] 24 Hour Crisis Services  [phone]    [facility] [phone]    [facility]   [phone]     Emergency treatment -- Immediately    [facility] 911                Clostridium difficile enterocolitis 12/18/2012     Priority: Medium     Groin fluid collection 12/15/2012     Priority: Medium     CT 12/12- New (since 5/11) collection measuring at least 2.3 cm in diameter and 6.5 cm in length within the right inguinal canal. 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 Gerson Ravi and aristides at the  in 2012  NAD 2017  CT scan of the neck every 2-3 years.  - Thyroid labs yearly.  - Carotid ultrasound in three to four years to evaluate for stenosis.       Hyperlipidemia LDL goal <100 10/31/2010     Priority: Medium     CAD (coronary artery disease) 05/26/2009     Priority: Medium     Stress testing 2009 showed inferior wall ischemia.  Cath preformed; identified moderate CAD with stenosis of 40-50% in LAD and RCA.  No stents were placed.  Echo in 01/2018 (done while in Afib with rates of 100-110); EF of 55-60%.  No RWMA.       GERD  (gastroesophageal reflux disease) 05/26/2009     Priority: Medium     Hypertrophy of breast 09/25/2007     Priority: Medium     PERS HX TOBACCO USE - quit in 11/06 with chantix 03/15/2007     Priority: Medium     Hypertension, benign essential, goal below 140/90 11/07/2005     Priority: Medium     Patient has only fair bp control and with family history of diabetes will all ace if lab indicated also has bph and may op for psa and not digital exam next yr        Pain in joint, shoulder region 11/07/2005     Priority: Medium     Hypertrophy of prostate without urinary obstruction 11/07/2005     Priority: Medium     Problem list name updated by automated process. Provider to review       Impotence of organic origin 11/07/2005     Priority: Medium        Past Medical History:    Past Medical History:   Diagnosis Date     Acute kidney injury (H) 04/17/2019     Acute on chronic pancreatitis (H) 01/23/2018     Bacteriuria with pyuria 04/17/2019     C. difficile colitis      Chronic atrial fibrillation (H)      Colon polyp 08/26/2011     Diabetes mellitus (H)      Hypertension      Malignant neoplasm (H) 08/2012     Recurrent pancreatitis        Past Surgical History:    Past Surgical History:   Procedure Laterality Date     BREAST SURGERY  01/01/2008    right breast mass benign     COLECTOMY SUBTOTAL  2013    With diverting ostomy creation; done for toxic C difficile colitis     COLONOSCOPY      multiple polyps removed     COLONOSCOPY  08/24/2011    TUMOR/POLYP/LESION BY SNARE     COLONOSCOPY  12/17/2012    COLONOSCOPY     COLONOSCOPY  12/18/2012    COLONOSCOPY     DENERVATION OF SPERMATIC CORD MICROSURGICAL Left 05/23/2017    Procedure: DENERVATION OF SPERMATIC CORD MICROSURGICAL;;  Surgeon: Marcio Aggarwal MD;  [facility]     DISSECTION RADICAL NECK BILATERAL  08/02/2012    Procedure: DISSECTION RADICAL NECK BILATERAL;;  Surgeon: Yung Alvares MD;  [facility]     ENDOSCOPIC RETROGRADE  CHOLANGIOPANCREATOGRAM N/A 05/10/2016    Procedure: COMBINED ENDOSCOPIC RETROGRADE CHOLANGIOPANCREATOGRAPHY, PLACE TUBE/STENT;  Surgeon: Yovanny Beasley MD;  [facility]     ENDOSCOPIC RETROGRADE CHOLANGIOPANCREATOGRAM N/A 03/29/2018    Procedure: ENDOSCOPIC RETROGRADE CHOLANGIOPANCREATOGRAM;  Endoscopic Retrograde Cholangiopancreatogram, Endoscopic Ultrasound, Biliary Sphincterotomy, Biliary and Pancreatic Stent Placement;  Surgeon: Yovanny Beasley MD;  [facility]     ENDOSCOPIC ULTRASOUND UPPER GASTROINTESTINAL TRACT (GI) N/A 02/03/2016    Procedure: ENDOSCOPIC ULTRASOUND, ESOPHAGOSCOPY / UPPER GASTROINTESTINAL TRACT (GI);  Surgeon: Grabiel Plata MD;  [facility]     ENDOSCOPIC ULTRASOUND UPPER GASTROINTESTINAL TRACT (GI) N/A 03/29/2018    Procedure: ENDOSCOPIC ULTRASOUND, ESOPHAGOSCOPY / UPPER GASTROINTESTINAL TRACT (GI);;  Surgeon: Grabiel Plata MD;  [facility]     ESOPHAGOSCOPY, GASTROSCOPY, DUODENOSCOPY (EGD), COMBINED N/A 02/03/2016    Procedure: COMBINED ENDOSCOPIC ULTRASOUND, ESOPHAGOSCOPY, GASTROSCOPY, DUODENOSCOPY (EGD), FINE NEEDLE ASPIRATE/BIOPSY;  Surgeon: Grabiel Plata MD;  [facility]     ESOPHAGOSCOPY, GASTROSCOPY, DUODENOSCOPY (EGD), COMBINED N/A 06/08/2016    Procedure: COMBINED ESOPHAGOSCOPY, GASTROSCOPY, DUODENOSCOPY (EGD), REMOVE FOREIGN BODY;  Surgeon: Yovanny Beasley MD;  [facility]     ESOPHAGOSCOPY, GASTROSCOPY, DUODENOSCOPY (EGD), COMBINED N/A 04/17/2018    Procedure: COMBINED ESOPHAGOSCOPY, GASTROSCOPY, DUODENOSCOPY (EGD), REMOVE FOREIGN BODY;  EGD with stent removal;  Surgeon: Grabiel Plata MD;  [facility]     EXCISE LESION INTRAORAL  06/14/2012    Procedure: EXCISE LESION INTRAORAL;  Wide Local Excision Floor of Mouth, Direct Laryngoscopy, Bilateral Vanderbilt's Marsuplization, Split Thickness Skin Graft from right Thigh  Latex Safe;  Surgeon: Gerson Ravi MD;  [facility]     EXCISE LESION INTRAORAL   2012    Procedure: EXCISE LESION INTRAORAL;  Floor of Mouth Resection, Bilateral Selective Radical Neck Dissection, Tracheostomy, Left Radial Forearm  Free Flap with Alloderm, Nasogastric Feeding Tube Placement,    * Latex Safe*;  Surgeon: Gerson Ravi MD;  [facility]     EXCISE LESION INTRAORAL  2012    Procedure: EXCISE LESION INTRAORAL;  takedown of oral flap;  Surgeon: Yung Alvares MD;  [facility]     GRAFT FREE VASCULARIZED (LOCATION)  2012    Procedure: GRAFT FREE VASCULARIZED (LOCATION);;  Surgeon: Yung Alvares MD;  [facility]     GRAFT SKIN SPLIT THICKNESS FROM EXTREMITY  2012    Procedure: GRAFT SKIN SPLIT THICKNESS FROM EXTREMITY;;  Surgeon: Gerson Ravi MD;  [facility]     IR LOWER EXTREMITY ANGIOGRAM LEFT  2022     LAPAROSCOPIC ILEOSTOMY TAKEDOWN  2013    LAPAROSCOPIC ILEOSTOMY TAKEDOWN     LAPAROTOMY EXPLORATORY  2012    LAPAROTOMY EXPLORATORY with Colectomy     LARYNGOSCOPY  2012    Procedure: LARYNGOSCOPY;;  Surgeon: Gerson Ravi MD;  [facility]     ORTHOPEDIC SURGERY      ganglian cyst left ankle     PANCREATECTOMY, SPLENECTOMY N/A 03/10/2016    Procedure: PANCREATECTOMY, SPLENECTOMY;  Surgeon: Nael Abel MD;  [facility]     SHOULDER SURGERY  ,     2006- right rotator cuff, 2008 bone spur on left. Dr. Hdez     VARICOCELECTOMY Left 2017    Procedure: VARICOCELECTOMY;  Left Varicocele Repair, Denervation of Left Testis;  Surgeon: Marcio Aggarwal MD;  [facility]       Family History:    Family History   Problem Relation Age of Onset     Diabetes Sister         onset age 50     Alzheimer Disease Mother          80     Alzheimer Disease Father          85     Diabetes Other         nephew type 1     Diabetes Other      Aneurysm Sister      Anesthesia Reaction No family hx of      Colon Cancer No family hx of      Colon Polyps No family hx of      Crohn's Disease No family hx of       "Ulcerative Colitis No family hx of        Social History:  Marital Status:   [2]  Social History     Tobacco Use     Smoking status: Former     Packs/day: 1.00     Years: 40.00     Pack years: 40.00     Types: Cigarettes     Quit date: 11/24/2006     Years since quitting: 15.9     Smokeless tobacco: Never   Vaping Use     Vaping Use: Never used   Substance Use Topics     Alcohol use: Yes     Drug use: Never        Medications:    oxyCODONE-acetaminophen (PERCOCET) 5-325 MG tablet  ASPIRIN NOT PRESCRIBED (INTENTIONAL)  atorvastatin (LIPITOR) 80 MG tablet  clopidogrel (PLAVIX) 75 MG tablet  Continuous Blood Gluc Sensor (FREESTYLE LISA 14 DAY SENSOR) MISC  empagliflozin (JARDIANCE) 10 MG TABS tablet  insulin NPH (NOVOLIN N FLEXPEN RELION) 100 UNIT/ML injection  insulin pen needle (BD LUIS U/F) 32G X 4 MM miscellaneous  lisinopril (ZESTRIL) 10 MG tablet  metoprolol succinate ER (TOPROL XL) 50 MG 24 hr tablet  multivitamin, therapeutic with minerals (THERA-VIT-M) TABS  pregabalin (LYRICA) 150 MG capsule  tamsulosin (FLOMAX) 0.4 MG capsule  vitamin B-12 (CYANOCOBALAMIN) 100 MCG tablet  Vitamin D3 (CHOLECALCIFEROL) 25 mcg (1000 units) tablet  warfarin ANTICOAGULANT (COUMADIN) 2.5 MG tablet          Review of Systems   Constitutional: Negative for fever.   Respiratory: Negative for shortness of breath.    Cardiovascular: Negative for chest pain.   Gastrointestinal: Positive for abdominal pain.   All other systems reviewed and are negative.      Physical Exam   BP: (!) 170/84  Pulse: 100  Temp: 97.5  F (36.4  C)  Resp: 18  Height: 177.8 cm (5' 10\")  Weight: 90.7 kg (200 lb)  SpO2: 92 %      Physical Exam  /87   Pulse 81   Temp 97.5  F (36.4  C) (Oral)   Resp 18   Ht 1.778 m (5' 10\")   Wt 90.7 kg (200 lb)   SpO2 92%   BMI 28.70 kg/m    General: alert, interactive, in no apparent distress  Head: atraumatic  Nose: no rhinorrhea or epistaxis  Ears: no external auditory canal discharge or bleeding.    Eyes: " Sclera nonicteric. Conjunctiva noninjected. PERRL, EOMI  Mouth: no tonsillar erythema, edema, or exudate  Neck: supple, no palp LAD  Lungs: CTAB  CV: RRR, S1/S2; peripheral pulses palpable and symmetric  Abdomen: soft, tender in the left upper abdomen, nd, no guarding or rebound. Positive bowel sounds  Extremities: no cyanosis or edema  Skin: no rash or diaphoresis  Neuro:   strength 5/5 in UE and LEs bilaterally, sensation intact to light touch in UE and LEs bilaterally;       ED Course                 Procedures              Critical Care time:  none               Results for orders placed or performed during the hospital encounter of 10/22/22 (from the past 24 hour(s))   CBC with Platelets & Differential    Narrative    The following orders were created for panel order CBC with Platelets & Differential.  Procedure                               Abnormality         Status                     ---------                               -----------         ------                     CBC with platelets and d...[[id number]]  Abnormal            Final result               Manual Differential[[id number]]          Abnormal            Final result                 Please view results for these tests on the individual orders.   Basic metabolic panel   Result Value Ref Range    Sodium 136 136 - 145 mmol/L    Potassium 5.1 3.4 - 5.3 mmol/L    Chloride 101 98 - 107 mmol/L    Carbon Dioxide (CO2) 22 22 - 29 mmol/L    Anion Gap 13 7 - 15 mmol/L    Urea Nitrogen 20.9 8.0 - 23.0 mg/dL    Creatinine 0.87 0.67 - 1.17 mg/dL    Calcium 9.4 8.8 - 10.2 mg/dL    Glucose 145 (H) 70 - 99 mg/dL    GFR Estimate >90 >60 mL/min/1.73m2   CBC with platelets and differential   Result Value Ref Range    WBC Count 13.3 (H) 4.0 - 11.0 10e3/uL    RBC Count 4.91 4.40 - 5.90 10e6/uL    Hemoglobin 16.0 13.3 - 17.7 g/dL    Hematocrit 49.2 40.0 - 53.0 %     78 - 100 fL    MCH 32.6 26.5 - 33.0 pg    MCHC 32.5 31.5 - 36.5 g/dL    RDW 17.0 (H) 10.0 - 15.0 %     Platelet Count 197 150 - 450 10e3/uL   Hepatic panel   Result Value Ref Range    Protein Total 7.4 6.4 - 8.3 g/dL    Albumin 3.5 3.5 - 5.2 g/dL    Bilirubin Total 0.9 <=1.2 mg/dL    Alkaline Phosphatase 73 40 - 129 U/L    AST 43 10 - 50 U/L    ALT 24 10 - 50 U/L    Bilirubin Direct <0.20 0.00 - 0.30 mg/dL   Lipase   Result Value Ref Range    Lipase 190 (H) 13 - 60 U/L   Manual Differential   Result Value Ref Range    % Neutrophils 80 %    % Lymphocytes 7 %    % Monocytes 11 %    % Eosinophils 2 %    % Basophils 0 %    Absolute Neutrophils 10.6 (H) 1.6 - 8.3 10e3/uL    Absolute Lymphocytes 0.9 0.8 - 5.3 10e3/uL    Absolute Monocytes 1.5 (H) 0.0 - 1.3 10e3/uL    Absolute Eosinophils 0.3 0.0 - 0.7 10e3/uL    Absolute Basophils 0.0 0.0 - 0.2 10e3/uL    RBC Morphology Confirmed RBC Indices     Platelet Assessment (A) Automated Count Confirmed. Platelet morphology is normal.     Automated Count Confirmed. Giant platelets are present.    [place] Bodies Present (A) None Seen    Polychromasia Slight (A) None Seen     *Note: Due to a large number of results and/or encounters for the requested time period, some results have not been displayed. A complete set of results can be found in Results Review.       Medications   ketorolac (TORADOL) injection 15 mg (15 mg Intravenous Given 10/22/22 2232)   lidocaine (viscous) (XYLOCAINE) 2 % 15 mL, alum & mag hydroxide-simethicone (MAALOX) 15 mL GI Cocktail ( Oral Given 10/22/22 2237)   morphine (PF) injection 4 mg (4 mg Intravenous Given 10/22/22 2233)       Assessments & Plan (with Medical Decision Making)  76 year old male, presenting the emergency department with concerns regarding abdominal pain, located in the left upper quadrant of the abdomen.  Patient arrives afebrile, slightly hypertensive, otherwise normal vitals.  Patient does have history of recurrent episodes of pancreatitis, and I feel this is likely similar episode.  Labs showing CBC with slightly elevated white  blood cell count at 13.3, nonspecific.  Basic metabolic panel unremarkable.  Liver tests normal.  Lipase slightly elevated at 190.  History and exam consistent with acute alcoholic pancreatitis.  Patient did receive Toradol, GI cocktail, and 4 mg IV morphine.  Pain is now completely gone.    Patient will be discharged home, and I recommended conservative treatment, and will be prescribed 6 tablets of Percocet.  Instructed on alcohol cessation, clear liquid diet, and follow-up in clinic with return instructions/precautions given.     I have reviewed the nursing notes.    I have reviewed the findings, diagnosis, plan and need for follow up with the patient.       New Prescriptions    OXYCODONE-ACETAMINOPHEN (PERCOCET) 5-325 MG TABLET    Take 1 tablet by mouth every 6 hours as needed for severe pain       Final diagnoses:   Left upper quadrant abdominal pain   Acute pancreatitis, unspecified complication status, unspecified pancreatitis type       10/22/2022   [facility] EMERGENCY DEPT     Catrachito Gould MD  10/22/22 3835

## 2022-10-23 NOTE — ED TRIAGE NOTES
Pt with abd pain in LUQ.  Sharp and started this evening at 3pm.  Pt had surgery on his pancrease about 8-10 yrs ago.  Pt is nauseate, no vomiting.   Pt last at at 10am.   Pt able to drink water and 2 beer prior to 3pm.        Pt has not taken any medication for the pain.   Pain is rated as 3/10 currently, but will go up to a 9/10.

## 2022-10-24 ENCOUNTER — TELEPHONE (OUTPATIENT)
Dept: ANTICOAGULATION | Facility: CLINIC | Age: 77
End: 2022-10-24

## 2022-10-24 NOTE — TELEPHONE ENCOUNTER
ANTICOAGULATION  MANAGEMENT: Discharge Review    Antoine JESSICA Russo chart reviewed for anticoagulation continuity of care    Emergency room visit on 10/22 for Left upper quadrant abdominal pain.    Discharge disposition: Home    Results:    Recent labs: (last 7 days)     10/20/22  0736   INR 2.1*     Anticoagulation inpatient management:     not applicable     Anticoagulation discharge instructions:     Warfarin dosing: home regimen continued   Bridging: No   INR goal change: No      Medication changes affecting anticoagulation: Yes: Percocet     Additional factors affecting anticoagulation: No     PLAN     No adjustment to anticoagulation plan needed    Patient not contacted    No adjustment to Anticoagulation Calendar was required    Madhavi Sanders RN

## 2022-10-25 ENCOUNTER — APPOINTMENT (OUTPATIENT)
Dept: CT IMAGING | Facility: CLINIC | Age: 77
DRG: 377 | End: 2022-10-25
Attending: FAMILY MEDICINE
Payer: COMMERCIAL

## 2022-10-25 ENCOUNTER — HOSPITAL ENCOUNTER (INPATIENT)
Facility: CLINIC | Age: 77
LOS: 9 days | Discharge: HOME | DRG: 377 | End: 2022-11-03
Attending: FAMILY MEDICINE | Admitting: FAMILY MEDICINE
Payer: COMMERCIAL

## 2022-10-25 ENCOUNTER — HOSPITAL ENCOUNTER (OUTPATIENT)
Age: 77
End: 2022-10-25
Attending: INTERNAL MEDICINE
Payer: COMMERCIAL

## 2022-10-25 DIAGNOSIS — K29.01 GASTROINTESTINAL HEMORRHAGE ASSOCIATED WITH ACUTE GASTRITIS: ICD-10-CM

## 2022-10-25 DIAGNOSIS — I48.20 CHRONIC ATRIAL FIBRILLATION (H): ICD-10-CM

## 2022-10-25 DIAGNOSIS — K85.90 ACUTE PANCREATITIS, UNSPECIFIED COMPLICATION STATUS, UNSPECIFIED PANCREATITIS TYPE: ICD-10-CM

## 2022-10-25 DIAGNOSIS — K29.00 ACUTE GASTRITIS WITHOUT HEMORRHAGE, UNSPECIFIED GASTRITIS TYPE: ICD-10-CM

## 2022-10-25 DIAGNOSIS — K85.80 OTHER ACUTE PANCREATITIS, UNSPECIFIED COMPLICATION STATUS: ICD-10-CM

## 2022-10-25 DIAGNOSIS — Z11.52 ENCOUNTER FOR SCREENING LABORATORY TESTING FOR SEVERE ACUTE RESPIRATORY SYNDROME CORONAVIRUS 2 (SARS-COV-2): ICD-10-CM

## 2022-10-25 DIAGNOSIS — E11.42 TYPE 2 DIABETES MELLITUS WITH DIABETIC POLYNEUROPATHY, WITHOUT LONG-TERM CURRENT USE OF INSULIN (H): Chronic | ICD-10-CM

## 2022-10-25 DIAGNOSIS — K85.20 ALCOHOL-INDUCED ACUTE PANCREATITIS, UNSPECIFIED COMPLICATION STATUS: Primary | ICD-10-CM

## 2022-10-25 LAB
ALBUMIN SERPL BCG-MCNC: 3.4 G/DL (ref 3.5–5.2)
ALP SERPL-CCNC: 75 U/L (ref 40–129)
ALT SERPL W P-5'-P-CCNC: 19 U/L (ref 10–50)
ANION GAP SERPL CALCULATED.3IONS-SCNC: 15 MMOL/L (ref 7–15)
AST SERPL W P-5'-P-CCNC: 24 U/L (ref 10–50)
BASOPHILS # BLD MANUAL: 0 10E3/UL (ref 0–0.2)
BASOPHILS NFR BLD MANUAL: 0 %
BILIRUB SERPL-MCNC: 1.4 MG/DL
BUN SERPL-MCNC: 20.1 MG/DL (ref 8–23)
CALCIUM SERPL-MCNC: 9.7 MG/DL (ref 8.8–10.2)
CHLORIDE SERPL-SCNC: 96 MMOL/L (ref 98–107)
CREAT SERPL-MCNC: 0.94 MG/DL (ref 0.67–1.17)
DEPRECATED HCO3 PLAS-SCNC: 24 MMOL/L (ref 22–29)
EOSINOPHIL # BLD MANUAL: 0.3 10E3/UL (ref 0–0.7)
EOSINOPHIL NFR BLD MANUAL: 2 %
ERYTHROCYTE [DISTWIDTH] IN BLOOD BY AUTOMATED COUNT: 17.2 % (ref 10–15)
ETHANOL SERPL-MCNC: <0.01 G/DL
GFR SERPL CREATININE-BSD FRML MDRD: 85 ML/MIN/1.73M2
GLUCOSE BLDC GLUCOMTR-MCNC: 114 MG/DL (ref 70–99)
GLUCOSE SERPL-MCNC: 165 MG/DL (ref 70–99)
HCT VFR BLD AUTO: 48.1 % (ref 40–53)
HGB BLD-MCNC: 15.6 G/DL (ref 13.3–17.7)
INR PPP: 1.81 (ref 0.85–1.15)
LIPASE SERPL-CCNC: 200 U/L (ref 13–60)
LYMPHOCYTES # BLD MANUAL: 1 10E3/UL (ref 0.8–5.3)
LYMPHOCYTES NFR BLD MANUAL: 6 %
MCH RBC QN AUTO: 32.9 PG (ref 26.5–33)
MCHC RBC AUTO-ENTMCNC: 32.4 G/DL (ref 31.5–36.5)
MCV RBC AUTO: 102 FL (ref 78–100)
MONOCYTES # BLD MANUAL: 0.8 10E3/UL (ref 0–1.3)
MONOCYTES NFR BLD MANUAL: 5 %
NEUTROPHILS # BLD MANUAL: 13.9 10E3/UL (ref 1.6–8.3)
NEUTROPHILS NFR BLD MANUAL: 87 %
PLAT MORPH BLD: ABNORMAL
PLATELET # BLD AUTO: 169 10E3/UL (ref 150–450)
POTASSIUM SERPL-SCNC: 4.7 MMOL/L (ref 3.4–5.3)
PROT SERPL-MCNC: 7.3 G/DL (ref 6.4–8.3)
RBC # BLD AUTO: 4.74 10E6/UL (ref 4.4–5.9)
RBC MORPH BLD: ABNORMAL
SARS-COV-2 RNA RESP QL NAA+PROBE: NEGATIVE
SODIUM SERPL-SCNC: 135 MMOL/L (ref 136–145)
WBC # BLD AUTO: 16 10E3/UL (ref 4–11)

## 2022-10-25 PROCEDURE — 36415 COLL VENOUS BLD VENIPUNCTURE: CPT | Performed by: FAMILY MEDICINE

## 2022-10-25 PROCEDURE — 82040 ASSAY OF SERUM ALBUMIN: CPT | Performed by: FAMILY MEDICINE

## 2022-10-25 PROCEDURE — 80053 COMPREHEN METABOLIC PANEL: CPT | Performed by: FAMILY MEDICINE

## 2022-10-25 PROCEDURE — 83690 ASSAY OF LIPASE: CPT | Performed by: FAMILY MEDICINE

## 2022-10-25 PROCEDURE — C9803 HOPD COVID-19 SPEC COLLECT: HCPCS | Performed by: FAMILY MEDICINE

## 2022-10-25 PROCEDURE — C9113 INJ PANTOPRAZOLE SODIUM, VIA: HCPCS | Performed by: FAMILY MEDICINE

## 2022-10-25 PROCEDURE — 99285 EMERGENCY DEPT VISIT HI MDM: CPT | Mod: 25 | Performed by: FAMILY MEDICINE

## 2022-10-25 PROCEDURE — 96366 THER/PROPH/DIAG IV INF ADDON: CPT | Performed by: FAMILY MEDICINE

## 2022-10-25 PROCEDURE — 43239 EGD BIOPSY SINGLE/MULTIPLE: CPT | Performed by: SURGERY

## 2022-10-25 PROCEDURE — 250N000009 HC RX 250: Performed by: FAMILY MEDICINE

## 2022-10-25 PROCEDURE — 74177 CT ABD & PELVIS W/CONTRAST: CPT

## 2022-10-25 PROCEDURE — 82077 ASSAY SPEC XCP UR&BREATH IA: CPT | Performed by: FAMILY MEDICINE

## 2022-10-25 PROCEDURE — 85610 PROTHROMBIN TIME: CPT | Performed by: FAMILY MEDICINE

## 2022-10-25 PROCEDURE — 85007 BL SMEAR W/DIFF WBC COUNT: CPT | Performed by: FAMILY MEDICINE

## 2022-10-25 PROCEDURE — 120N000001 HC R&B MED SURG/OB

## 2022-10-25 PROCEDURE — 99222 1ST HOSP IP/OBS MODERATE 55: CPT | Mod: AI | Performed by: PHYSICIAN ASSISTANT

## 2022-10-25 PROCEDURE — 250N000011 HC RX IP 250 OP 636: Performed by: FAMILY MEDICINE

## 2022-10-25 PROCEDURE — 96375 TX/PRO/DX INJ NEW DRUG ADDON: CPT | Performed by: FAMILY MEDICINE

## 2022-10-25 PROCEDURE — 87635 SARS-COV-2 COVID-19 AMP PRB: CPT | Performed by: FAMILY MEDICINE

## 2022-10-25 PROCEDURE — 85027 COMPLETE CBC AUTOMATED: CPT | Performed by: FAMILY MEDICINE

## 2022-10-25 PROCEDURE — 258N000003 HC RX IP 258 OP 636: Performed by: FAMILY MEDICINE

## 2022-10-25 PROCEDURE — 99285 EMERGENCY DEPT VISIT HI MDM: CPT | Performed by: FAMILY MEDICINE

## 2022-10-25 PROCEDURE — 250N000011 HC RX IP 250 OP 636: Performed by: PHYSICIAN ASSISTANT

## 2022-10-25 PROCEDURE — 96365 THER/PROPH/DIAG IV INF INIT: CPT | Mod: 59 | Performed by: FAMILY MEDICINE

## 2022-10-25 RX ORDER — PROCHLORPERAZINE 25 MG
12.5 SUPPOSITORY, RECTAL RECTAL EVERY 12 HOURS PRN
Status: DISCONTINUED | OUTPATIENT
Start: 2022-10-25 | End: 2022-10-26

## 2022-10-25 RX ORDER — PROCHLORPERAZINE MALEATE 5 MG
5 TABLET ORAL EVERY 6 HOURS PRN
Status: DISCONTINUED | OUTPATIENT
Start: 2022-10-25 | End: 2022-10-26

## 2022-10-25 RX ORDER — HYDROMORPHONE HYDROCHLORIDE 1 MG/ML
0.5 INJECTION, SOLUTION INTRAMUSCULAR; INTRAVENOUS; SUBCUTANEOUS
Status: DISCONTINUED | OUTPATIENT
Start: 2022-10-25 | End: 2022-10-26

## 2022-10-25 RX ORDER — DEXTROSE MONOHYDRATE, SODIUM CHLORIDE, AND POTASSIUM CHLORIDE 50; 1.49; 4.5 G/1000ML; G/1000ML; G/1000ML
INJECTION, SOLUTION INTRAVENOUS CONTINUOUS
Status: DISCONTINUED | OUTPATIENT
Start: 2022-10-25 | End: 2022-10-26

## 2022-10-25 RX ORDER — IOPAMIDOL 755 MG/ML
97 INJECTION, SOLUTION INTRAVASCULAR ONCE
Status: COMPLETED | OUTPATIENT
Start: 2022-10-25 | End: 2022-10-25

## 2022-10-25 RX ORDER — ONDANSETRON 4 MG/1
4 TABLET, ORALLY DISINTEGRATING ORAL EVERY 6 HOURS PRN
Status: DISCONTINUED | OUTPATIENT
Start: 2022-10-25 | End: 2022-10-26

## 2022-10-25 RX ORDER — ONDANSETRON 2 MG/ML
4 INJECTION INTRAMUSCULAR; INTRAVENOUS EVERY 6 HOURS PRN
Status: DISCONTINUED | OUTPATIENT
Start: 2022-10-25 | End: 2022-10-26

## 2022-10-25 RX ADMIN — IOPAMIDOL 97 ML: 755 INJECTION, SOLUTION INTRAVENOUS at 10:06

## 2022-10-25 RX ADMIN — PANTOPRAZOLE SODIUM 40 MG: 40 INJECTION, POWDER, FOR SOLUTION INTRAVENOUS at 23:38

## 2022-10-25 RX ADMIN — ONDANSETRON 4 MG: 2 INJECTION INTRAMUSCULAR; INTRAVENOUS at 23:38

## 2022-10-25 RX ADMIN — PHYTONADIONE 2 MG: 2 INJECTION, EMULSION INTRAMUSCULAR; INTRAVENOUS; SUBCUTANEOUS at 18:38

## 2022-10-25 RX ADMIN — POTASSIUM CHLORIDE, DEXTROSE MONOHYDRATE AND SODIUM CHLORIDE: 150; 5; 450 INJECTION, SOLUTION INTRAVENOUS at 17:45

## 2022-10-25 RX ADMIN — SODIUM CHLORIDE 64 ML: 9 INJECTION, SOLUTION INTRAVENOUS at 10:06

## 2022-10-25 RX ADMIN — HYDROMORPHONE HYDROCHLORIDE 0.5 MG: 1 INJECTION, SOLUTION INTRAMUSCULAR; INTRAVENOUS; SUBCUTANEOUS at 16:32

## 2022-10-25 RX ADMIN — PANTOPRAZOLE SODIUM 40 MG: 40 INJECTION, POWDER, FOR SOLUTION INTRAVENOUS at 12:14

## 2022-10-25 RX ADMIN — HYDROMORPHONE HYDROCHLORIDE 0.5 MG: 1 INJECTION, SOLUTION INTRAMUSCULAR; INTRAVENOUS; SUBCUTANEOUS at 23:38

## 2022-10-25 ASSESSMENT — ACTIVITIES OF DAILY LIVING (ADL)
DRESSING/BATHING_DIFFICULTY: NO
VISION_MANAGEMENT: GLASSES
CHANGE_IN_FUNCTIONAL_STATUS_SINCE_ONSET_OF_CURRENT_ILLNESS/INJURY: NO
ADLS_ACUITY_SCORE: 41
ADLS_ACUITY_SCORE: 41
DIFFICULTY_EATING/SWALLOWING: NO
WALKING_OR_CLIMBING_STAIRS_DIFFICULTY: NO
ADLS_ACUITY_SCORE: 41
ADLS_ACUITY_SCORE: 41
WEAR_GLASSES_OR_BLIND: YES
FALL_HISTORY_WITHIN_LAST_SIX_MONTHS: NO
ADLS_ACUITY_SCORE: 26
CONCENTRATING,_REMEMBERING_OR_MAKING_DECISIONS_DIFFICULTY: NO
ADLS_ACUITY_SCORE: 41
DOING_ERRANDS_INDEPENDENTLY_DIFFICULTY: NO
ADLS_ACUITY_SCORE: 39
TOILETING_ISSUES: NO
ADLS_ACUITY_SCORE: 41

## 2022-10-25 NOTE — ED PROVIDER NOTES
History     Chief Complaint   Patient presents with     Abdominal Pain     LUQ since Saturday hx of pancreatitis     HPI     Antoine Russo is a 76 year old male who with left upper quadrant abdominal pain.  This began 3 days ago.  He was seen for 2 days ago.  He was found to have an elevated lipase.  He has had episodes of pancreatitis in the past.  Is uncertain if this was related to alcohol use or an anatomic problem.  He does have a intraductal papillary mucinous neoplasm.  He did have a couple of beers before the onset of pain on Saturday.  He was prescribed some oxycodone and took some on Saturday afternoon and Sunday morning but vomited all day Sunday was not sure if it was due to the oxycodone.  He has not used any pain medication since then.  Pain is interfering with his sleep.  He is not taking any Tylenol or anti-inflammatories.  He is on warfarin for history of A. fib.  His bowel movements have been normal for his poor oral intake which means they have been decreased.  He has no new urinary symptoms.  He is not having any respiratory symptoms or chest pain.  He has not noticed fevers.  He does not smoke.  He has not had any alcohol since Saturday.  He has the below noted chronic medical problems.  He does not want anything for pain right now.  He took his morning meds with some water and Pedialyte and was able to keep them down.    Allergies:  Allergies   Allergen Reactions     Nkda [No Known Drug Allergies]        Problem List:    Patient Active Problem List    Diagnosis Date Noted     Anticipated difficulty with intubation 05/23/2017     Priority: High     Class: Chronic     Difficult two hands mask ventilation, intubated multiple times asleep with video laryngoscope. H/o tongue cancer surgery.        Chronic kidney disease, stage 2 (mild) 01/20/2022     Priority: Medium     Alcohol abuse 02/16/2021     Priority: Medium     Leukocytosis 02/16/2021     Priority: Medium     Nausea with vomiting  02/16/2021     Priority: Medium     Dehydration 02/16/2021     Priority: Medium     Encounter for screening laboratory testing for COVID-19 virus 02/16/2021     Priority: Medium     Acute recurrent pancreatitis 10/17/2020     Priority: Medium     Acute gastritis without hemorrhage, unspecified gastritis type 10/17/2020     Priority: Medium     Added automatically from request for surgery [id number]       Acute gastritis 10/03/2020     Priority: Medium     Peripheral polyneuropathy 05/12/2020     Priority: Medium     Chronic low back pain with sciatica, sciatica laterality unspecified, unspecified back pain laterality 05/12/2020     Priority: Medium     Alcohol dependence, uncomplicated (H) 02/04/2020     Priority: Medium     Acute right-sided low back pain with left-sided sciatica 02/04/2020     Priority: Medium     Spinal stenosis of lumbar region, unspecified whether neurogenic claudication present 02/04/2020     Priority: Medium     Pancreatic pseudocyst 04/18/2019     Priority: Medium     Acute pancreatitis 10/21/2018     Priority: Medium     Long term current use of anticoagulant therapy 04/05/2018     Priority: Medium     Recurrent pancreatitis 03/30/2018     Priority: Medium     Chronic atrial fibrillation (H) 01/23/2018     Priority: Medium     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 Please refer if Care Coordination services are needed.    Presenting Problem Signs and Symptoms Treatment Plan    Questions or concerns   during clinic hours   I will call my clinic directly:  [facility]  [address], [place] [address]  [phone].   Questions or concerns outside clinic hours   I will call the 24 hour nurse line at   [phone] or [facility].   Need to schedule an appointment   I will call the 24 hour scheduling team at [phone] or my clinic directly at [phone].   Same day treatment     I will call my clinic first, nurse line if after hours, urgent care and express care if needed.   Clinic care coordination services (regular clinic hours)   I will call a clinic care coordinator directly:     Shelia Emanuel RN [facility]  [phone]    JAY Estes:    [phone]    Or call my clinic at [phone] and ask to speak with care coordination.   Crisis Services: Behavioral or Mental Health  [facility] 24 Hour Phone Line  [phone]    [facility] 24 Hour Crisis Services  [phone]    [facility] (Behavioral Health Providers) Network [phone]    [facility]   [phone]     Emergency treatment -- Immediately    [facility] 911                Clostridium difficile enterocolitis 12/18/2012     Priority: Medium     Groin fluid collection 12/15/2012     Priority: Medium     CT 12/12- New (since 5/11) collection measuring at least 2.3 cm in diameter and 6.5 cm in length within the right inguinal canal. 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 Gerson Ravi and collekapil at the  in 2012  NAD 2017  CT scan of the neck every 2-3 years.  - Thyroid labs yearly.  - Carotid  ultrasound in three to four years to evaluate for stenosis.       Hyperlipidemia LDL goal <100 10/31/2010     Priority: Medium     CAD (coronary artery disease) 05/26/2009     Priority: Medium     Stress testing 2009 showed inferior wall ischemia.  Cath preformed; identified moderate CAD with stenosis of 40-50% in LAD and RCA.  No stents were placed.  Echo in 01/2018 (done while in Afib with rates of 100-110); EF of 55-60%.  No RWMA.       GERD (gastroesophageal reflux disease) 05/26/2009     Priority: Medium     Hypertrophy of breast 09/25/2007     Priority: Medium     PERS HX TOBACCO USE - quit in 11/06 with chantix 03/15/2007     Priority: Medium     Hypertension, benign essential, goal below 140/90 11/07/2005     Priority: Medium     Patient has only fair bp control and with family history of diabetes will all ace if lab indicated also has bph and may op for psa and not digital exam next yr        Pain in joint, shoulder region 11/07/2005     Priority: Medium     Hypertrophy of prostate without urinary obstruction 11/07/2005     Priority: Medium     Problem list name updated by automated process. Provider to review       Impotence of organic origin 11/07/2005     Priority: Medium        Past Medical History:    Past Medical History:   Diagnosis Date     Acute kidney injury (H) 04/17/2019     Acute on chronic pancreatitis (H) 01/23/2018     Bacteriuria with pyuria 04/17/2019     C. difficile colitis      Chronic atrial fibrillation (H)      Colon polyp 08/26/2011     Diabetes mellitus (H)      Hypertension      Malignant neoplasm (H) 08/2012     Recurrent pancreatitis        Past Surgical History:    Past Surgical History:   Procedure Laterality Date     BREAST SURGERY  01/01/2008    right breast mass benign     COLECTOMY SUBTOTAL  2013    With diverting ostomy creation; done for toxic C difficile colitis     COLONOSCOPY      multiple polyps removed     COLONOSCOPY  08/24/2011    TUMOR/POLYP/LESION BY SNARE      COLONOSCOPY  12/17/2012    COLONOSCOPY     COLONOSCOPY  12/18/2012    COLONOSCOPY     DENERVATION OF SPERMATIC CORD MICROSURGICAL Left 05/23/2017    Procedure: DENERVATION OF SPERMATIC CORD MICROSURGICAL;;  Surgeon: Marcio Aggarwal MD;  [facility]     DISSECTION RADICAL NECK BILATERAL  08/02/2012    Procedure: DISSECTION RADICAL NECK BILATERAL;;  Surgeon: Yung Alvares MD;  [facility] OR     ENDOSCOPIC RETROGRADE CHOLANGIOPANCREATOGRAM N/A 05/10/2016    Procedure: COMBINED ENDOSCOPIC RETROGRADE CHOLANGIOPANCREATOGRAPHY, PLACE TUBE/STENT;  Surgeon: Yovanny Beasley MD;  [facility]     ENDOSCOPIC RETROGRADE CHOLANGIOPANCREATOGRAM N/A 03/29/2018    Procedure: ENDOSCOPIC RETROGRADE CHOLANGIOPANCREATOGRAM;  Endoscopic Retrograde Cholangiopancreatogram, Endoscopic Ultrasound, Biliary Sphincterotomy, Biliary and Pancreatic Stent Placement;  Surgeon: Yovanny Beasley MD;  [facility]     ENDOSCOPIC ULTRASOUND UPPER GASTROINTESTINAL TRACT (GI) N/A 02/03/2016    Procedure: ENDOSCOPIC ULTRASOUND, ESOPHAGOSCOPY / UPPER GASTROINTESTINAL TRACT (GI);  Surgeon: Grabiel Plata MD;  [facility]     ENDOSCOPIC ULTRASOUND UPPER GASTROINTESTINAL TRACT (GI) N/A 03/29/2018    Procedure: ENDOSCOPIC ULTRASOUND, ESOPHAGOSCOPY / UPPER GASTROINTESTINAL TRACT (GI);;  Surgeon: Grabiel Plata MD;  [facility]     ESOPHAGOSCOPY, GASTROSCOPY, DUODENOSCOPY (EGD), COMBINED N/A 02/03/2016    Procedure: COMBINED ENDOSCOPIC ULTRASOUND, ESOPHAGOSCOPY, GASTROSCOPY, DUODENOSCOPY (EGD), FINE NEEDLE ASPIRATE/BIOPSY;  Surgeon: Grabiel Plata MD;  [facility]     ESOPHAGOSCOPY, GASTROSCOPY, DUODENOSCOPY (EGD), COMBINED N/A 06/08/2016    Procedure: COMBINED ESOPHAGOSCOPY, GASTROSCOPY, DUODENOSCOPY (EGD), REMOVE FOREIGN BODY;  Surgeon: Yovanny Beasley MD;  [facility]     ESOPHAGOSCOPY, GASTROSCOPY, DUODENOSCOPY (EGD), COMBINED N/A 04/17/2018    Procedure: COMBINED ESOPHAGOSCOPY,  GASTROSCOPY, DUODENOSCOPY (EGD), REMOVE FOREIGN BODY;  EGD with stent removal;  Surgeon: Grabiel Plata MD;  [facility]     EXCISE LESION INTRAORAL  06/14/2012    Procedure: EXCISE LESION INTRAORAL;  Wide Local Excision Floor of Mouth, Direct Laryngoscopy, Bilateral [place]'s Marsuplization, Split Thickness Skin Graft from right Thigh  Latex Safe;  Surgeon: Gerson Ravi MD;  [facility]     EXCISE LESION INTRAORAL  08/02/2012    Procedure: EXCISE LESION INTRAORAL;  Floor of Mouth Resection, Bilateral Selective Radical Neck Dissection, Tracheostomy, Left Radial Forearm  Free Flap with Alloderm, Nasogastric Feeding Tube Placement,    * Latex Safe*;  Surgeon: Gerson Ravi MD;  [facility]     EXCISE LESION INTRAORAL  12/11/2012    Procedure: EXCISE LESION INTRAORAL;  takedown of oral flap;  Surgeon: Yung Alvares MD;  [facility]     GRAFT FREE VASCULARIZED (LOCATION)  08/02/2012    Procedure: GRAFT FREE VASCULARIZED (LOCATION);;  Surgeon: Yung Alvares MD;  [facility]     GRAFT SKIN SPLIT THICKNESS FROM EXTREMITY  06/14/2012    Procedure: GRAFT SKIN SPLIT THICKNESS FROM EXTREMITY;;  Surgeon: Gerson Ravi MD;  [facility]     IR LOWER EXTREMITY ANGIOGRAM LEFT  9/30/2022     LAPAROSCOPIC ILEOSTOMY TAKEDOWN  06/06/2013    LAPAROSCOPIC ILEOSTOMY TAKEDOWN     LAPAROTOMY EXPLORATORY  12/20/2012    LAPAROTOMY EXPLORATORY with Colectomy     LARYNGOSCOPY  06/14/2012    Procedure: LARYNGOSCOPY;;  Surgeon: Gerson Ravi MD;  [facility]     ORTHOPEDIC SURGERY      ganglian cyst left ankle     PANCREATECTOMY, SPLENECTOMY N/A 03/10/2016    Procedure: PANCREATECTOMY, SPLENECTOMY;  Surgeon: Nael Abel MD;  [facility]     SHOULDER SURGERY  2006, 2008    2006- right rotator cuff, 2008 bone spur on left. Dr. Hdez     VARICOCELECTOMY Left 05/23/2017    Procedure: VARICOCELECTOMY;  Left Varicocele Repair, Denervation of Left Testis;  Surgeon: Marcio Aggarwal MD;  Location:  OR  "      Family History:    Family History   Problem Relation Age of Onset     Diabetes Sister         onset age 50     Alzheimer Disease Mother          80     Alzheimer Disease Father          85     Diabetes Other         nephew type 1     Diabetes Other      Aneurysm Sister      Anesthesia Reaction No family hx of      Colon Cancer No family hx of      Colon Polyps No family hx of      Crohn's Disease No family hx of      Ulcerative Colitis No family hx of        Social History:  Marital Status:   [2]  Social History     Tobacco Use     Smoking status: Former     Packs/day: 1.00     Years: 40.00     Pack years: 40.00     Types: Cigarettes     Quit date: 2006     Years since quitting: 15.9     Smokeless tobacco: Never   Vaping Use     Vaping Use: Never used   Substance Use Topics     Alcohol use: Yes     Drug use: Never        Medications:    atorvastatin (LIPITOR) 80 MG tablet  clopidogrel (PLAVIX) 75 MG tablet  Continuous Blood Gluc Sensor (FREESTYLE LISA 14 DAY SENSOR) MISC  empagliflozin (JARDIANCE) 10 MG TABS tablet  insulin NPH (NOVOLIN N FLEXPEN RELION) 100 UNIT/ML injection  insulin pen needle (BD LUIS U/F) 32G X 4 MM miscellaneous  lisinopril (ZESTRIL) 10 MG tablet  metoprolol succinate ER (TOPROL XL) 50 MG 24 hr tablet  multivitamin, therapeutic with minerals (THERA-VIT-M) TABS  oxyCODONE-acetaminophen (PERCOCET) 5-325 MG tablet  pregabalin (LYRICA) 150 MG capsule  tamsulosin (FLOMAX) 0.4 MG capsule  vitamin B-12 (CYANOCOBALAMIN) 100 MCG tablet  Vitamin D3 (CHOLECALCIFEROL) 25 mcg (1000 units) tablet  warfarin ANTICOAGULANT (COUMADIN) 2.5 MG tablet  ASPIRIN NOT PRESCRIBED (INTENTIONAL)          Review of Systems  All other systems are reviewed and are negative    Physical Exam   BP: (!) 171/78  Pulse: 85  Temp: 97.2  F (36.2  C)  Resp: 18  Height: 177.8 cm (5' 10\")  Weight: 90.7 kg (200 lb)  SpO2: 95 %      Physical Exam  Nursing note and vitals were reviewed.  Constitutional: Awake " and alert, adequately nourished and developed appearing 76-year-old in no apparent discomfort, who does not appear acutely ill, and who answers questions appropriately and cooperates with examination.  HEENT: EOMI.   Neck: Freely mobile.  Cardiovascular: Cardiac examination reveals normal heart rate and regular rhythm without murmur.  Pulmonary/Chest: Breathing is unlabored.  Breath sounds are clear and equal bilaterally.  There no retractions, tachypnea, rales, wheezes, or rhonchi.  Abdomen: Numerous well-healed scars, soft, nontender, no HSM or masses rebound or guarding.  Musculoskeletal: Extremities are warm and well-perfused  Neurological: Alert, oriented, thought content logical, coherent   Skin: Warm, dry, no rashes.  Psychiatric: Affect broad and appropriate.      ED Course                 Procedures              Critical Care time:  none               Results for orders placed or performed during the hospital encounter of 10/25/22 (from the past 24 hour(s))   CBC with platelets differential    Narrative    The following orders were created for panel order CBC with platelets differential.  Procedure                               Abnormality         Status                     ---------                               -----------         ------                     CBC with platelets and d...[[id number]]  Abnormal            Final result               Manual Differential[[id number]]          Abnormal            Final result                 Please view results for these tests on the individual orders.   Comprehensive metabolic panel   Result Value Ref Range    Sodium 135 (L) 136 - 145 mmol/L    Potassium 4.7 3.4 - 5.3 mmol/L    Chloride 96 (L) 98 - 107 mmol/L    Carbon Dioxide (CO2) 24 22 - 29 mmol/L    Anion Gap 15 7 - 15 mmol/L    Urea Nitrogen 20.1 8.0 - 23.0 mg/dL    Creatinine 0.94 0.67 - 1.17 mg/dL    Calcium 9.7 8.8 - 10.2 mg/dL    Glucose 165 (H) 70 - 99 mg/dL    Alkaline Phosphatase 75 40 - 129 U/L    AST  24 10 - 50 U/L    ALT 19 10 - 50 U/L    Protein Total 7.3 6.4 - 8.3 g/dL    Albumin 3.4 (L) 3.5 - 5.2 g/dL    Bilirubin Total 1.4 (H) <=1.2 mg/dL    GFR Estimate 85 >60 mL/min/1.73m2   Lipase   Result Value Ref Range    Lipase 200 (H) 13 - 60 U/L   Ethyl Alcohol Level   Result Value Ref Range    Alcohol ethyl <0.01 (H) <=0.00 g/dL   INR   Result Value Ref Range    INR 1.81 (H) 0.85 - 1.15   CBC with platelets and differential   Result Value Ref Range    WBC Count 16.0 (H) 4.0 - 11.0 10e3/uL    RBC Count 4.74 4.40 - 5.90 10e6/uL    Hemoglobin 15.6 13.3 - 17.7 g/dL    Hematocrit 48.1 40.0 - 53.0 %     (H) 78 - 100 fL    MCH 32.9 26.5 - 33.0 pg    MCHC 32.4 31.5 - 36.5 g/dL    RDW 17.2 (H) 10.0 - 15.0 %    Platelet Count 169 150 - 450 10e3/uL   Manual Differential   Result Value Ref Range    % Neutrophils 87 %    % Lymphocytes 6 %    % Monocytes 5 %    % Eosinophils 2 %    % Basophils 0 %    Absolute Neutrophils 13.9 (H) 1.6 - 8.3 10e3/uL    Absolute Lymphocytes 1.0 0.8 - 5.3 10e3/uL    Absolute Monocytes 0.8 0.0 - 1.3 10e3/uL    Absolute Eosinophils 0.3 0.0 - 0.7 10e3/uL    Absolute Basophils 0.0 0.0 - 0.2 10e3/uL    RBC Morphology Confirmed RBC Indices     Platelet Assessment (A) Automated Count Confirmed. Platelet morphology is normal.     Automated Count Confirmed. Giant platelets are present.   CT Abdomen Pelvis w Contrast    Narrative    CT ABDOMEN/PELVIS WITH CONTRAST October 25, 2022 10:18 AM    CLINICAL HISTORY: Left upper quadrant abdominal pain, elevated lipase.    TECHNIQUE: CT scan of the abdomen and pelvis was performed following  injection of IV contrast. Multiplanar reformats were obtained. Dose  reduction techniques were used.  CONTRAST: 97 mL Isovue-370.     COMPARISON: 9/13/2022, 10/28/2020, and 10/17/2020.    FINDINGS:   LOWER CHEST: Atelectasis is seen in both lung bases.    HEPATOBILIARY: Normal.    PANCREAS: Distal pancreatectomy is unchanged in appearance. No  inflammatory change to  indicate CT evidence for pancreatitis. No  evidence of mass or pancreatic ductal dilation.    SPLEEN: Spleen is surgically absent.    ADRENAL GLANDS: Normal.    KIDNEYS/BLADDER: Multiple cysts are again identified throughout both  kidneys. One exophytic cyst at the posterolateral aspect of the mid  right kidney measures 13 mm and is soft tissue density. This is  unchanged from the previous exam dating back to 2020 and likely  represents a hemorrhagic cyst. MRI could verify if indicated. No  hydronephrosis. No bladder wall thickening or mass.    BOWEL: Diffuse gastric wall thickening is increased from September 2022. Along the lesser curvature there are two likely metallic clips  that are unchanged from the previous exam, but adjacent to this there  is an elongated fluid density measuring roughly 25 x 17 x 45 mm. The  mucosa over this appears intact, but this could represent deep ulcer  or abscess. There is mild inflammatory change and edema surrounding  the stomach indicating gastritis. The stomach abuts the resection  margin of the pancreas and pancreatitis with inflammation surrounding  the stomach would be unlikely, but cannot be excluded. Endoscopic  ultrasound may be useful for further evaluation. There are a few  prominent to borderline enlarged gastrohepatic lymph nodes present as  well. Subtotal colectomy is noted.    PELVIC ORGANS: No evidence for free fluid or adenopathy in the pelvis.    ADDITIONAL FINDINGS: The aorta is normal in caliber with extensive  atherosclerotic calcification. Several prominent retroperitoneal lymph  nodes are present.    MUSCULOSKELETAL: Degenerative changes are noted through the spine.      Impression    IMPRESSION:   1.  Diffuse gastric wall thickening has increased since the previous  exam and there is tubular new fluid density along the lesser curvature  of the stomach. Differential would include deep ulcer or abscess,  although the mucosa appears intact overlying this. This  has developed  since 9/13/2022. Given the fluid and inflammation surrounding the  stomach and the fact that it abuts the resection margin of the  pancreas, pancreatitis predominantly involving inflammation of the  stomach cannot be excluded, but is considered unlikely. Endoscopic  ultrasound may be useful for further evaluation.  2.  Multiple cysts are again seen throughout both kidneys. One  hemorrhagic cyst versus solid mass arising from the lateral right  kidney is unchanged. MRI could further evaluate if indicated.  3.  Splenectomy and distal pancreatectomy. No evidence for  inflammation of the residual pancreas.    JENNIFER MODI MD         SYSTEM ID:  [id number]   Asymptomatic COVID-19 Virus (Coronavirus) by PCR Nose    Specimen: Nose; Swab   Result Value Ref Range    SARS CoV2 PCR Negative Negative    Narrative    Testing was performed using the enzo  SARS-CoV-2 & Influenza A/B Assay on the enzo  Cherelle  System.  This test should be ordered for the detection of SARS-COV-2 in individuals who meet SARS-CoV-2 clinical and/or epidemiological criteria. Test performance is unknown in asymptomatic patients.  This test is for in vitro diagnostic use under the FDA EUA for laboratories certified under CLIA to perform moderate and/or high complexity testing. This test has not been FDA cleared or approved.  A negative test does not rule out the presence of PCR inhibitors in the specimen or target RNA in concentration below the limit of detection for the assay. The possibility of a false negative should be considered if the patient's recent exposure or clinical presentation suggests COVID-19.  [facility] are certified under the Clinical Laboratory Improvement Amendments of 1988 (CLIA-88) as qualified to perform moderate and/or high complexity laboratory testing.     *Note: Due to a large number of results and/or encounters for the requested time period, some results have not been displayed. A  complete set of results can be found in Results Review.       Medications   pantoprazole (PROTONIX) IV push injection 40 mg (40 mg Intravenous Given 10/25/22 1214)   HYDROmorphone (PF) (DILAUDID) injection 0.5 mg (0.5 mg Intravenous Given 10/25/22 1632)   phytonadione (AQUA-MEPHYTON) 2 mg in sodium chloride 0.9 % 50 mL intermittent infusion (has no administration in time range)   iopamidol (ISOVUE-370) solution 97 mL (97 mLs Intravenous Given 10/25/22 1006)   sodium chloride 0.9 % bag 500mL for CT scan flush use (64 mLs Intravenous Given 10/25/22 1006)       Assessments & Plan (with Medical Decision Making)     76-year-old male presented with abdominal pain as described above.  He was here 2 days ago with an elevated lipase.  He was discharged home.  He returned because of persistent pain.  Today he had advanced imaging showing the above findings of gastric wall thickening and associated edema suggestive of acute gastritis with this potential fluid collection as described above.  My impression is that he likely has gastritis possibly with an ulcer which may be causing his pancreatitis or he may have both together.  Unfortunately has been using alcohol prior to the onset of the pain.  He does not have any evidence of bleeding.  He has not had hematemesis, melena, hematochezia.  His hemoglobin is normal.  His white count is modestly elevated but this is nonspecific and could be related to the gastritis and/or pancreatitis.  I have a low suspicion that the fluid collection represents an abscess.  I think he would benefit most by upper GI endoscopy to investigate the above findings further.  I discussed his case with Dr. Schofield in general surgery who can scope him tomorrow.  I have initiated proton pump inhibitor therapy with Protonix 40 mg every 12 hours.  I did discuss findings on recommendations with the patient and his wife who are agreeable to the plan.     I have reviewed the nursing notes.    I have reviewed  the findings, diagnosis, plan and need for follow up with the patient.       New Prescriptions    No medications on file       Final diagnoses:   Acute gastritis without hemorrhage, unspecified gastritis type   Acute pancreatitis, unspecified complication status, unspecified pancreatitis type       10/25/2022   [facility] EMERGENCY DEPT     Greg Huerta MD  10/25/22 3103

## 2022-10-25 NOTE — PROGRESS NOTES
"[facility]  Transfer Triage Note    Date of call: 10/25/22  Time of call: 12:23 PM    Current Patient Location: [facility]  Current Level of Care: ED    Vitals: Temp: 97.2  F (36.2  C) Temp src: Tympanic BP: 134/76 Pulse: 107   Resp: 18 SpO2: 94 % Height: 177.8 cm (5' 10\") Weight: 90.7 kg (200 lb)  O2 Device: None (Room air) at    Diagnosis: gastritis  Is COVID-19 a concern? No  Reason for requested transfer: Further diagnostic work up, management, and consultation for specialized care   Isolation Needs: None    Outside Records: Available  Additional records may be faxed to [phone].    Transfer accepted: Yes  Stability of Patient: Patient is vitally stable, with no critical labs, and will likely remain stable throughout the transfer process  Level of Care Needed: Med Surg  Telemetry Needed:  None  Expected Time of Arrival for Transfer: greater than 24 hours  Arrival Location:  [facility] [place]    Recommendations for Management and Stabilization: Given    Additional Comments:     76M h/o splenectomy, distal pancreatectomy, h/o intraductal papillary mucinous neoplasm  P/w 10/10 epigastric pain  Onset 2 days  Elevated lipase  Couple of beers recently  No fevers  Took oxycodone at home without relief    CT with diffuse gastric wall thickening, worse from prior  No e/o inflammation of residual pancreas  See CT read for details  Needs endoscopy    Appropriate for transfer to [place] med/surg  Unfortunately no beds currently available at this time  Added to wait list for [facility] ED to continue to manage and may look elsewhere in meantime.     Harshad Anthony MD      "

## 2022-10-25 NOTE — ED TRIAGE NOTES
LUQ pain since Saturday. Pt seen Saturday has pancreatitis, pain is getting worse and pt can't sleep.      Triage Assessment     Row Name 10/25/22 3046       Triage Assessment (Adult)    Airway WDL WDL       Respiratory WDL    Respiratory WDL WDL       Cardiac WDL    Cardiac WDL WDL       Cognitive/Neuro/Behavioral WDL    Cognitive/Neuro/Behavioral WDL WDL

## 2022-10-25 NOTE — ED NOTES
Update to wife Lily- will plan to stay at work. Will call with status change or plans of admit vs transfer.

## 2022-10-25 NOTE — ED NOTES
"Pt has hx of pancreatitis, with last occurrence roughly 2 years ago. Same pain began again this past Saturday and has continued into today. Pain described as if \"someone kicked me with a sharp boot\". He states that pain will come in waves but when it hits, the pain shoots up to a sharp 10/10, along with nausea.   Currently pt rates pain as 2/10 but noticeable; denies nausea at this time.    "

## 2022-10-25 NOTE — LETTER
October 28, 2022      Poli Russo  [address] [address]        Dear ,    We are writing to inform you of your test results.    I just took one biopsy as I did not want to cause more bleeding.    Your stomach is chronically inflamed.  Hopefully you have had the abscess drained and area doing better.    Please follow up with me if you are not following up with someone else for your stomach.     Resulted Orders   Surgical Pathology Exam   Result Value Ref Range    Case Report       Surgical Pathology Report                         Case: [id number]                                  Authorizing Provider:  Jonatan Celeste MD Collected:           10/26/2022 11:08 AM          Ordering Location:     [facility]   Received:            10/26/2022 12:05 PM                                 [place]                                                                      Pathologist:           Marilin Gee MD                                                                           Specimen:    Stomach, Antrum                                                                            Final Diagnosis       Stomach, biopsy:  -Benign antral mucosa with reactive epithelial changes and minimal chronic inflammation; no evidence of Helicobacter-like organisms on routine stain; findings suggest irritant reaction        Comment        Reactive gastropathy is typically a noninflammatory chemical injury that may be due to certain medications (including NSAIDs and aspirin), excess alcohol, corticosteroid, or bile/alkaline reflux. Clinical correlation is suggested.       Clinical Information       Procedure:  ESOPHAGOGASTRODUODENOSCOPY, WITH BIOPSY  Pre-op Diagnosis: Acute gastritis without hemorrhage, unspecified gastritis type [K29.00]  Post-op Diagnosis: K29.00 - Acute gastritis without  hemorrhage, unspecified gastritis type [ICD-10-CM]      Gross Description       A(1). Stomach, Antrum, :  The specimen is received in formalin, labeled with the patient's name, medical record number and other identifying information and designated  stomach, antrum . It consists of a single tan soft tissue fragment measuring 0.2 cm in greatest dimension. Entirely submitted in one cassette.   (Debra Garzon)      Microscopic Description       A formal microscopic examination has been performed      Performing Labs       The technical component of this testing was completed at [facility]      Case Images         If you have any questions or concerns, please call the clinic at the number listed above.       Sincerely,      Jonatan Celeste MD

## 2022-10-25 NOTE — H&P
"[facility]    History and Physical - Hospitalist Service       Date of Admission:  10/25/2022    Assessment & Plan      Antoine Russo is a 76 year old male admitted on 10/25/2022. He presented to the emergency department for evaluation of epigastric pain and was found to have evidence of gastritis on imaging and concern for possible ulcer for which he is being admitted for further evaluation and treatment.    Acute gastritis without hemorrhage, concern for possible peptic ulcer disease  Presented with epigastric pain, CT abdomen & pelvis demonstrates \"Diffuse gastric wall thickening has increased since the previous exam and there is tubular new fluid density along the lesser curvature of the stomach. Differential would include deep ulcer or abscess, although the mucosa appears intact overlying this. This has developed since 9/13/2022. Given the fluid and inflammation surrounding the stomach and the fact that it abuts the resection margin of the pancreas, pancreatitis predominantly involving inflammation of the stomach cannot be excluded, but is considered unlikely. Endoscopic ultrasound may be useful for further evaluation...\" Admit hemoglobin normal (15.6), no evidence of bleeding.   Patient's personal risk factors for gastritis include near daily alcohol use, new clopidogrel use, and coumadin use.   Case discussed between emergency department and general surgery.  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  He has history of necrotizing pancreatitis, pancreatic duct leak, and a history of a stent in 2018, since removed. Patient does admit to near daily drinking. CT abdomen & " "pelvis demonstrates \"... Given the fluid and inflammation surrounding the stomach and the fact that it abuts the resection margin of the pancreas, pancreatitis predominantly involving inflammation of the stomach cannot be excluded, but is considered unlikely. Endoscopic ultrasound may be useful for further evaluation... 3.  Splenectomy and distal pancreatectomy. No evidence for inflammation of the residual pancreas.\" Admit lipase 200.   - LR @ 100 ml/hr  - NPO  - Antiemetics and analgesia noted above    Renal cysts  Incidental finding on CT abdomen & pelvis \"2.  Multiple cysts are again seen throughout both kidneys. One hemorrhagic cyst versus solid mass arising from the lateral right kidney is unchanged. MRI could further evaluate if indicated.\"   - Consider renal MRI as outpatient     Chronic atrial fibrillation   Long term current use of anticoagulant therapy / Drug Induced Coagulation Defect  Rate controlled prior to admission with metoprolol XL 75 mg bid. Anticoagulated prior to admission with coumadin, admit INR 1.81.   - Continue metoprolol   - INR reversal prior to EGD    Alcohol abuse  Patient admits to near daily alcohol use, \"a few beers a day.\" Last drink on 10/22. No evidence of withdrawal at time of admission.  - No CIWA indicated, but monitor for evidence of withdrawal    Type 2 diabetes mellitus with diabetic polyneuropathy, without long-term current use of insulin (H)  HgbA1c 8.2. Managed prior to admission with Jardiance 10 mg daily and Novolin NPH 40 U q am / 32 U q pm. Patient has not been using his insulin recently due to poor oral intake.   - Hold Novolin NPH while NPO  - Hold Jardiance  - High sliding scale insulin (NPO regimen)  - Hyper/hypoglycemia protocols    CAD (coronary artery disease)  Peripheral vascular disease / SFA disease  Hyperlipidemia LDL goal <100  Drug Induced Platelet Defect  Known PVD, follows with vascular Dr. Stacy. Recent left lower extremity angiogram on 9/30/22. " Known CAD on prior stress tests, but has never had PCI or CABG interventions. Managed prior to admission with atorvastatin 80 mg daily and clopidogrel 75 mg daily.   - Continue statin  - Clopidogrel on hold     Leukocytosis with left shift  Spleen absent  Admit WBC 16.0, ANC 13.9. 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 Unclear if he is up to date on immunizations. No indication for antibiotics at present.  - Monitor  - CBC in am     Hypertension, benign essential, goal below 140/90  Blood pressure intermittently elevated. Managed prior to admission with lisinopril 10 mg daily and metoprolol XL 75 mg bid.   - Continue lisinopril and metoprolol with holding parameters    Hypertrophy of prostate without urinary obstruction  Managed prior to admission with Flomax 0.4 mg daily, continue.     GERD (gastroesophageal reflux disease)  Noted on problem list, not on PPI or H2 blocker prior to admission.  - Start PPI as above    Peripheral polyneuropathy  Managed prior to admission with Lyrica 150 mg q pm, continue.     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 Not acute. Continue with outpatient management.           Diet: NPO per Anesthesia Guidelines for Procedure/Surgery Except for: Meds  DVT Prophylaxis: Warfarin prior to admission, currently undergoing INR reversal  Chavez Catheter: Not present  Central Lines: None  Cardiac Monitoring: None  Code Status: Full Code      Disposition Plan      Expected Discharge Date: 10/27/2022      Destination: home          The patient's care was discussed with the Attending Physician, Dr. Willam Nevarez and Patient.    Sheila Worley PA-C  Hospitalist Service  [facility]  Securely message with the Vocera Web Console (learn more here)  Text page via [facility] Paging/Directory  "        ______________________________________________________________________    Chief Complaint   \" My stomach has been hurting.\"    History is obtained from the patient, review of EMR, and emergency department sign out from Dr. Gerson Huerta.    History of Present Illness   Antoine Russo is a 76 year old male who presented to the emergency department for evaluation of epigastric pain.    Four days ago (10/22) patient developed epigastric pain that has persisted since onset.  He was evaluated in the emergency department on 10/22 for the pain and was diagnosed with possible mild pancreatitis which improved with interventions in the emergency department and patient was discharged home.  That evening he developed severe nausea and vomiting and vomited overnight about 6 times, non bloody emesis.  The following day he was tired but did not get much sleep due to the pain.  He has been unable to eat or drink much, but did have a [employer] burger yesterday.  Pain became intolerable so he presented to the emergency department again where he was found to have evidence of possible gastritis on CT imaging.    Pain is located in the left upper quadrant, is intermittent described \"like I am getting kicked with pointed cowboy boots,\" rated 8-9/10 when pain is present.  Pain subsides to a 0-1/10.  Pain does not radiate.  Pain is most tolerable when he lays on his left side, but becomes more uncomfortable if he is not in any other position.  He attempted oxycodone x1 dose at home, but then was unable to take more due to nausea and vomiting.    Patient denies any melena, hematochezia, hematemesis.  He has been on Coumadin for many years, but recently started clopidogrel due to peripheral artery disease.  He drinks alcohol on a near daily basis, \"a few beers per day.\"    Review of systems:  Patient has chronic A. fib but denies any palpitations.  He is occasionally short of breath, nothing new or different.  The remainder " review of systems is negative.      Review of Systems    The 10 point Review of Systems is negative other than noted in the HPI or here.     Past Medical History    I have reviewed this patient's medical history and updated it with pertinent information if needed.   Past Medical History:   Diagnosis Date     Acute kidney injury (H) 04/17/2019     Acute on chronic pancreatitis (H) 01/23/2018     Bacteriuria with pyuria 04/17/2019     C. difficile colitis      Chronic atrial fibrillation (H)     On chronic anticoagulation with coumadin     Colon polyp 08/26/2011    Colonoscopy 8/2011-A sessile polyp was found in the cecum. The polyp was 6 mm in size. The polyp was removed with a hot snare. Resection and retrieval were complete. A sessile polyp was found in the proximal transverse colon. The polyp was 15 mm in size. The polyp was removed with a hot snare. Resection and retrieval were complete. A sessile polyp was found in the sigmoid colon. 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, N0, M0 carcinoma, underwent transcervical glossectomy, floor of mouth excision, BL neck dissection, adj radiation, and skin flap reconstruction     Recurrent pancreatitis     alcoholic pancreatitis       Past Surgical History   I have reviewed this patient's surgical history and updated it with pertinent information if needed.  Past Surgical History:   Procedure Laterality Date     BREAST SURGERY  01/01/2008    right breast mass benign     COLECTOMY SUBTOTAL  2013    With diverting ostomy creation; done for toxic C difficile colitis     COLONOSCOPY      multiple polyps removed     COLONOSCOPY  08/24/2011    TUMOR/POLYP/LESION BY SNARE     COLONOSCOPY  12/17/2012    COLONOSCOPY     COLONOSCOPY  12/18/2012    COLONOSCOPY     DENERVATION OF SPERMATIC CORD MICROSURGICAL Left 05/23/2017    Procedure: DENERVATION OF SPERMATIC CORD  MICROSURGICAL;;  Surgeon: Marcio Aggarwal MD;  [facility]     DISSECTION RADICAL NECK BILATERAL  08/02/2012    Procedure: DISSECTION RADICAL NECK BILATERAL;;  Surgeon: Yung Alvares MD;  [facility]: [facility]     ENDOSCOPIC RETROGRADE CHOLANGIOPANCREATOGRAM N/A 05/10/2016    Procedure: COMBINED ENDOSCOPIC RETROGRADE CHOLANGIOPANCREATOGRAPHY, PLACE TUBE/STENT;  Surgeon: Yovanny Beasley MD;  Location: [facility]     ENDOSCOPIC RETROGRADE CHOLANGIOPANCREATOGRAM N/A 03/29/2018    Procedure: ENDOSCOPIC RETROGRADE CHOLANGIOPANCREATOGRAM;  Endoscopic Retrograde Cholangiopancreatogram, Endoscopic Ultrasound, Biliary Sphincterotomy, Biliary and Pancreatic Stent Placement;  Surgeon: Yovanny Beasley MD;  Location: [facility]     ENDOSCOPIC ULTRASOUND UPPER GASTROINTESTINAL TRACT (GI) N/A 02/03/2016    Procedure: ENDOSCOPIC ULTRASOUND, ESOPHAGOSCOPY / UPPER GASTROINTESTINAL TRACT (GI);  Surgeon: Grabiel Plata MD;  [facility]     ENDOSCOPIC ULTRASOUND UPPER GASTROINTESTINAL TRACT (GI) N/A 03/29/2018    Procedure: ENDOSCOPIC ULTRASOUND, ESOPHAGOSCOPY / UPPER GASTROINTESTINAL TRACT (GI);;  Surgeon: Grabiel Plata MD;  [facility]     ESOPHAGOSCOPY, GASTROSCOPY, DUODENOSCOPY (EGD), COMBINED N/A 02/03/2016    Procedure: COMBINED ENDOSCOPIC ULTRASOUND, ESOPHAGOSCOPY, GASTROSCOPY, DUODENOSCOPY (EGD), FINE NEEDLE ASPIRATE/BIOPSY;  Surgeon: Grabiel Plata MD;  [facility]     ESOPHAGOSCOPY, GASTROSCOPY, DUODENOSCOPY (EGD), COMBINED N/A 06/08/2016    Procedure: COMBINED ESOPHAGOSCOPY, GASTROSCOPY, DUODENOSCOPY (EGD), REMOVE FOREIGN BODY;  Surgeon: Yovanny Beasley MD;  [facility]     ESOPHAGOSCOPY, GASTROSCOPY, DUODENOSCOPY (EGD), COMBINED N/A 04/17/2018    Procedure: COMBINED ESOPHAGOSCOPY, GASTROSCOPY, DUODENOSCOPY (EGD), REMOVE FOREIGN BODY;  EGD with stent removal;  Surgeon: Grabiel Plata MD;  [facility]     EXCISE LESION INTRAORAL  06/14/2012    Procedure:  EXCISE LESION INTRAORAL;  Wide Local Excision Floor of Mouth, Direct Laryngoscopy, Bilateral Ida's Marsuplization, Split Thickness Skin Graft from right Thigh  Latex Safe;  Surgeon: Gerson Ravi MD;  [facility]     EXCISE LESION INTRAORAL  08/02/2012    Procedure: EXCISE LESION INTRAORAL;  Floor of Mouth Resection, Bilateral Selective Radical Neck Dissection, Tracheostomy, Left Radial Forearm  Free Flap with Alloderm, Nasogastric Feeding Tube Placement,    * Latex Safe*;  Surgeon: Gerson Ravi MD;  [facility]     EXCISE LESION INTRAORAL  12/11/2012    Procedure: EXCISE LESION INTRAORAL;  takedown of oral flap;  Surgeon: Yung Alvares MD;  [facility]     GRAFT FREE VASCULARIZED (LOCATION)  08/02/2012    Procedure: GRAFT FREE VASCULARIZED (LOCATION);;  Surgeon: Yung Alvares MD;  [facility]     GRAFT SKIN SPLIT THICKNESS FROM EXTREMITY  06/14/2012    Procedure: GRAFT SKIN SPLIT THICKNESS FROM EXTREMITY;;  Surgeon: Gerson Ravi MD;  [facility]     IR LOWER EXTREMITY ANGIOGRAM LEFT  9/30/2022     LAPAROSCOPIC ILEOSTOMY TAKEDOWN  06/06/2013    LAPAROSCOPIC ILEOSTOMY TAKEDOWN     LAPAROTOMY EXPLORATORY  12/20/2012    LAPAROTOMY EXPLORATORY with Colectomy     LARYNGOSCOPY  06/14/2012    Procedure: LARYNGOSCOPY;;  Surgeon: Gerson Ravi MD;  [facility]     ORTHOPEDIC SURGERY      ganglian cyst left ankle     PANCREATECTOMY, SPLENECTOMY N/A 03/10/2016    Procedure: PANCREATECTOMY, SPLENECTOMY;  Surgeon: Nael Abel MD;  [facility]     SHOULDER SURGERY  2006, 2008    2006- right rotator cuff, 2008 bone spur on left. Dr. Hdez     VARICOCELECTOMY Left 05/23/2017    Procedure: VARICOCELECTOMY;  Left Varicocele Repair, Denervation of Left Testis;  Surgeon: Marcio Aggarwal MD;  [facility]       Social History   I have reviewed this patient's social history and updated it with pertinent information if needed.  Social History     Tobacco Use     Smoking status: Former      Packs/day: 1.00     Years: 40.00     Pack years: 40.00     Types: Cigarettes     Quit date: 2006     Years since quitting: 15.9     Smokeless tobacco: Never   Vaping Use     Vaping Use: Never used   Substance Use Topics     Alcohol use: Yes     Drug use: Never       Family History   I have reviewed this patient's family history and updated it with pertinent information if needed.  Family History   Problem Relation Age of Onset     Diabetes Sister         onset age 50     Alzheimer Disease Mother          80     Alzheimer Disease Father          85     Diabetes Other         nephew type 1     Diabetes Other      Aneurysm Sister      Anesthesia Reaction No family hx of      Colon Cancer No family hx of      Colon Polyps No family hx of      Crohn's Disease No family hx of      Ulcerative Colitis No family hx of        Prior to Admission Medications   Prior to Admission Medications   Prescriptions Last Dose Informant Patient Reported? Taking?   ASPIRIN NOT PRESCRIBED (INTENTIONAL)  Self No No   Sig: Please choose reason not prescribed from choices below.   Continuous Blood Gluc Sensor (Cloud[employer] Software[employer] LISA 14 DAY SENSOR) [facility] [place] 10/25/2022 at am #100 Self No Yes   Sig: USE 1 SENSOR EVERY 14 DAYS   Vitamin D3 (CHOLECALCIFEROL) 25 mcg (1000 units) tablet 10/25/2022 at am Self Yes Yes   Sig: Take 1 tablet by mouth daily   atorvastatin (LIPITOR) 80 MG tablet 10/25/2022 at am Self No Yes   Sig: Take 1 tablet (80 mg) by mouth daily   clopidogrel (PLAVIX) 75 MG tablet 10/25/2022 at am Self No Yes   Sig: Take 1 tablet (75 mg) by mouth daily Start taking medication the day after the procedure.   empagliflozin (JARDIANCE) 10 MG TABS tablet 10/25/2022 at am Self No Yes   Sig: Take 1 tablet (10 mg) by mouth daily   insulin NPH (NOVOLIN N FLEXPEN RELION) 100 UNIT/ML injection Past Week Self No Yes   Sig: Take 40 units of N in the am and 32 units in the pm.   insulin pen needle (BD LUIS U/F) 32G X 4 MM miscellaneous Past Week  Self No Yes   Sig: USE PEN NEEDLE ONCE DAILY AS DIRECTED   lisinopril (ZESTRIL) 10 MG tablet 10/25/2022 at am Self No Yes   Sig: Take 1 tablet (10 mg) by mouth daily   metoprolol succinate ER (TOPROL XL) 50 MG 24 hr tablet 10/25/2022 at am Self No Yes   Sig: TAKE 1 & 1/2 (ONE & ONE-HALF) TABLETS BY MOUTH TWICE DAILY   multivitamin, therapeutic with minerals (THERA-VIT-M) TABS 10/25/2022 at am Self No Yes   Sig: Take 1 tablet by mouth daily.   oxyCODONE-acetaminophen (PERCOCET) 5-325 MG tablet 10/22/2022 at pm Self No Yes   Sig: Take 1 tablet by mouth every 6 hours as needed for severe pain   pregabalin (LYRICA) 150 MG capsule 10/24/2022 at pm Self No Yes   Sig: Take 1 capsule by mouth twice daily   Patient taking differently: Take 150 mg by mouth every evening   tamsulosin (FLOMAX) 0.4 MG capsule 10/25/2022 at am Self No Yes   Sig: Take 1 capsule by mouth once daily   vitamin B-12 (CYANOCOBALAMIN) 100 MCG tablet 10/25/2022 at am Self Yes Yes   Sig: Take 100 mcg by mouth daily   warfarin ANTICOAGULANT (COUMADIN) 2.5 MG tablet 10/24/2022 at pm Self No Yes   Sig: TAKE 2.5 mg every Tue, Thu, Sat; 1.25 mg all other days OR AS DIRECTED BY ANTICOAGULATION CLINIC.      Facility-Administered Medications: None     Allergies   Allergies   Allergen Reactions     Nkda [No Known Drug Allergies]        Physical Exam   Vital Signs: Temp: 98.3  F (36.8  C) Temp src: Oral BP: 135/65 Pulse: 90   Resp: 18 SpO2: 92 % O2 Device: None (Room air)    Weight: 202 lbs 6.12 oz    Constitutional: Alert, oriented, cooperative. No apparent distress, appears nontoxic. Speaking in full sentences.     Eyes: Eyes are clear, pupils are reactive. No scleral icterus.     HEENT: Oropharynx is clear and moist, no lesions. Normocephalic, no evidence of cranial trauma.      Cardiovascular: Regular rhythm and rate, normal S1 and S2. No murmur, rubs, or gallops. Peripheral pulses intact bilaterally. No lower extremity edema.    Respiratory: Non-labored  breathing on room air. Lung sounds are clear to auscultation bilaterally without wheezes, rhonchi, or crackles.    GI: Soft, non-distended. Minimally tender in left upper quadrant, otherwise non-tender, no rebound or guarding. No hepatosplenomegaly or masses appreciated. Normal bowel sounds.     Musculoskeletal: Without obvious deformity, normal range of motion. Normal muscle bulk and tone. Distal CMS intact.      Skin: Warm and dry, no rashes or ecchymoses. No mottling of skin.      Neurologic: Patient moves all extremities. Gross strength and sensation are equal bilaterally.    Genitourinary: Deferred      Data   Data reviewed today: I reviewed all medications, new labs and imaging results over the last 24 hours. I personally reviewed the abdominal CT image(s) showing gastric wall thickening.    Recent Labs   Lab 10/25/22  2319 10/25/22  0920 10/22/22  2150 10/20/22  0736   WBC  --  16.0* 13.3* 8.0   HGB  --  15.6 16.0 15.9   MCV  --  102* 100 99   PLT  --  169 197 223   INR  --  1.81*  --  2.1*   NA  --  135* 136 139   POTASSIUM  --  4.7 5.1 4.6   CHLORIDE  --  96* 101 101   CO2  --  24 22 27   BUN  --  20.1 20.9 16.5   CR  --  0.94 0.87 1.03   ANIONGAP  --  15 13 11   NILSON  --  9.7 9.4 9.3   * 165* 145* 135*   ALBUMIN  --  3.4* 3.5 3.7   PROTTOTAL  --  7.3 7.4 6.8   BILITOTAL  --  1.4* 0.9 0.8   ALKPHOS  --  75 73 74   ALT  --  19 24 22   AST  --  24 43 24   LIPASE  --  200* 190*  --      Recent Results (from the past 24 hour(s))   CT Abdomen Pelvis w Contrast    Narrative    CT ABDOMEN/PELVIS WITH CONTRAST October 25, 2022 10:18 AM    CLINICAL HISTORY: Left upper quadrant abdominal pain, elevated lipase.    TECHNIQUE: CT scan of the abdomen and pelvis was performed following  injection of IV contrast. Multiplanar reformats were obtained. Dose  reduction techniques were used.  CONTRAST: 97 mL [id number].     COMPARISON: 9/13/2022, 10/28/2020, and 10/17/2020.    FINDINGS:   LOWER CHEST: Atelectasis is seen  in both lung bases.    HEPATOBILIARY: Normal.    PANCREAS: Distal pancreatectomy is unchanged in appearance. No  inflammatory change to indicate CT evidence for pancreatitis. No  evidence of mass or pancreatic ductal dilation.    SPLEEN: Spleen is surgically absent.    ADRENAL GLANDS: Normal.    KIDNEYS/BLADDER: Multiple cysts are again identified throughout both  kidneys. One exophytic cyst at the posterolateral aspect of the mid  right kidney measures 13 mm and is soft tissue density. This is  unchanged from the previous exam dating back to 2020 and likely  represents a hemorrhagic cyst. MRI could verify if indicated. No  hydronephrosis. No bladder wall thickening or mass.    BOWEL: Diffuse gastric wall thickening is increased from September 2022. Along the lesser curvature there are two likely metallic clips  that are unchanged from the previous exam, but adjacent to this there  is an elongated fluid density measuring roughly 25 x 17 x 45 mm. The  mucosa over this appears intact, but this could represent deep ulcer  or abscess. There is mild inflammatory change and edema surrounding  the stomach indicating gastritis. The stomach abuts the resection  margin of the pancreas and pancreatitis with inflammation surrounding  the stomach would be unlikely, but cannot be excluded. Endoscopic  ultrasound may be useful for further evaluation. There are a few  prominent to borderline enlarged gastrohepatic lymph nodes present as  well. Subtotal colectomy is noted.    PELVIC ORGANS: No evidence for free fluid or adenopathy in the pelvis.    ADDITIONAL FINDINGS: The aorta is normal in caliber with extensive  atherosclerotic calcification. Several prominent retroperitoneal lymph  nodes are present.    MUSCULOSKELETAL: Degenerative changes are noted through the spine.      Impression    IMPRESSION:   1.  Diffuse gastric wall thickening has increased since the previous  exam and there is tubular new fluid density along the  lesser curvature  of the stomach. Differential would include deep ulcer or abscess,  although the mucosa appears intact overlying this. This has developed  since 9/13/2022. Given the fluid and inflammation surrounding the  stomach and the fact that it abuts the resection margin of the  pancreas, pancreatitis predominantly involving inflammation of the  stomach cannot be excluded, but is considered unlikely. Endoscopic  ultrasound may be useful for further evaluation.  2.  Multiple cysts are again seen throughout both kidneys. One  hemorrhagic cyst versus solid mass arising from the lateral right  kidney is unchanged. MRI could further evaluate if indicated.  3.  Splenectomy and distal pancreatectomy. No evidence for  inflammation of the residual pancreas.    JENNIFER MODI MD         SYSTEM ID:  [id number]

## 2022-10-26 ENCOUNTER — ANESTHESIA (OUTPATIENT)
Dept: GASTROENTEROLOGY | Facility: CLINIC | Age: 77
DRG: 377 | End: 2022-10-26
Payer: COMMERCIAL

## 2022-10-26 ENCOUNTER — TELEPHONE (OUTPATIENT)
Dept: SURGERY | Facility: CLINIC | Age: 77
End: 2022-10-26

## 2022-10-26 ENCOUNTER — ANESTHESIA EVENT (OUTPATIENT)
Dept: GASTROENTEROLOGY | Facility: CLINIC | Age: 77
DRG: 377 | End: 2022-10-26
Payer: COMMERCIAL

## 2022-10-26 LAB
ABO/RH(D): NORMAL
ALBUMIN SERPL BCG-MCNC: 2.9 G/DL (ref 3.5–5.2)
ALP SERPL-CCNC: 69 U/L (ref 40–129)
ALT SERPL W P-5'-P-CCNC: 14 U/L (ref 10–50)
ANION GAP SERPL CALCULATED.3IONS-SCNC: 14 MMOL/L (ref 7–15)
ANTIBODY SCREEN: NEGATIVE
AST SERPL W P-5'-P-CCNC: 24 U/L (ref 10–50)
BASOPHILS # BLD MANUAL: 0 10E3/UL (ref 0–0.2)
BASOPHILS NFR BLD MANUAL: 0 %
BILIRUB SERPL-MCNC: 1.8 MG/DL
BLD PROD TYP BPU: NORMAL
BLD PROD TYP BPU: NORMAL
BLOOD COMPONENT TYPE: NORMAL
BLOOD COMPONENT TYPE: NORMAL
BUN SERPL-MCNC: 17.1 MG/DL (ref 8–23)
CALCIUM SERPL-MCNC: 9.3 MG/DL (ref 8.8–10.2)
CHLORIDE SERPL-SCNC: 100 MMOL/L (ref 98–107)
CODING SYSTEM: NORMAL
CODING SYSTEM: NORMAL
CREAT SERPL-MCNC: 0.87 MG/DL (ref 0.67–1.17)
DEPRECATED HCO3 PLAS-SCNC: 23 MMOL/L (ref 22–29)
EOSINOPHIL # BLD MANUAL: 0 10E3/UL (ref 0–0.7)
EOSINOPHIL NFR BLD MANUAL: 0 %
ERYTHROCYTE [DISTWIDTH] IN BLOOD BY AUTOMATED COUNT: 17.3 % (ref 10–15)
GFR SERPL CREATININE-BSD FRML MDRD: >90 ML/MIN/1.73M2
GLUCOSE BLDC GLUCOMTR-MCNC: 103 MG/DL (ref 70–99)
GLUCOSE BLDC GLUCOMTR-MCNC: 90 MG/DL (ref 70–99)
GLUCOSE BLDC GLUCOMTR-MCNC: 90 MG/DL (ref 70–99)
GLUCOSE BLDC GLUCOMTR-MCNC: 99 MG/DL (ref 70–99)
GLUCOSE SERPL-MCNC: 110 MG/DL (ref 70–99)
HCT VFR BLD AUTO: 48.7 % (ref 40–53)
HGB BLD-MCNC: 13.9 G/DL (ref 13.3–17.7)
HGB BLD-MCNC: 14.9 G/DL (ref 13.3–17.7)
HGB BLD-MCNC: 15.3 G/DL (ref 13.3–17.7)
INR PPP: 1.31 (ref 0.85–1.15)
INR PPP: 1.35 (ref 0.85–1.15)
INR PPP: 1.55 (ref 0.85–1.15)
LYMPHOCYTES # BLD MANUAL: 1.3 10E3/UL (ref 0.8–5.3)
LYMPHOCYTES NFR BLD MANUAL: 9 %
MCH RBC QN AUTO: 32.6 PG (ref 26.5–33)
MCHC RBC AUTO-ENTMCNC: 31.4 G/DL (ref 31.5–36.5)
MCV RBC AUTO: 104 FL (ref 78–100)
MONOCYTES # BLD MANUAL: 0.7 10E3/UL (ref 0–1.3)
MONOCYTES NFR BLD MANUAL: 5 %
NEUTROPHILS # BLD MANUAL: 12.5 10E3/UL (ref 1.6–8.3)
NEUTROPHILS NFR BLD MANUAL: 86 %
PLAT MORPH BLD: ABNORMAL
PLATELET # BLD AUTO: 147 10E3/UL (ref 150–450)
POTASSIUM SERPL-SCNC: 4.4 MMOL/L (ref 3.4–5.3)
PROT SERPL-MCNC: 6.5 G/DL (ref 6.4–8.3)
RBC # BLD AUTO: 4.69 10E6/UL (ref 4.4–5.9)
RBC MORPH BLD: ABNORMAL
SODIUM SERPL-SCNC: 137 MMOL/L (ref 136–145)
SPECIMEN EXPIRATION DATE: NORMAL
UNIT ABO/RH: NORMAL
UNIT ABO/RH: NORMAL
UNIT NUMBER: NORMAL
UNIT NUMBER: NORMAL
UNIT STATUS: NORMAL
UNIT STATUS: NORMAL
UNIT TYPE ISBT: 8400
UNIT TYPE ISBT: 8400
UPPER GI ENDOSCOPY: NORMAL
WBC # BLD AUTO: 14.5 10E3/UL (ref 4–11)

## 2022-10-26 PROCEDURE — 36415 COLL VENOUS BLD VENIPUNCTURE: CPT | Performed by: INTERNAL MEDICINE

## 2022-10-26 PROCEDURE — 250N000011 HC RX IP 250 OP 636: Performed by: PHYSICIAN ASSISTANT

## 2022-10-26 PROCEDURE — 85018 HEMOGLOBIN: CPT | Performed by: INTERNAL MEDICINE

## 2022-10-26 PROCEDURE — 0W3P8ZZ CONTROL BLEEDING IN GASTROINTESTINAL TRACT, VIA NATURAL OR ARTIFICIAL OPENING ENDOSCOPIC: ICD-10-PCS | Performed by: SURGERY

## 2022-10-26 PROCEDURE — 99221 1ST HOSP IP/OBS SF/LOW 40: CPT | Performed by: SURGERY

## 2022-10-26 PROCEDURE — 250N000011 HC RX IP 250 OP 636: Performed by: INTERNAL MEDICINE

## 2022-10-26 PROCEDURE — 0DB68ZX EXCISION OF STOMACH, VIA NATURAL OR ARTIFICIAL OPENING ENDOSCOPIC, DIAGNOSTIC: ICD-10-PCS | Performed by: SURGERY

## 2022-10-26 PROCEDURE — 250N000011 HC RX IP 250 OP 636: Performed by: NURSE ANESTHETIST, CERTIFIED REGISTERED

## 2022-10-26 PROCEDURE — 258N000003 HC RX IP 258 OP 636: Performed by: PHYSICIAN ASSISTANT

## 2022-10-26 PROCEDURE — 250N000009 HC RX 250: Performed by: SURGERY

## 2022-10-26 PROCEDURE — 85610 PROTHROMBIN TIME: CPT | Performed by: PHYSICIAN ASSISTANT

## 2022-10-26 PROCEDURE — 85027 COMPLETE CBC AUTOMATED: CPT | Performed by: PHYSICIAN ASSISTANT

## 2022-10-26 PROCEDURE — 86901 BLOOD TYPING SEROLOGIC RH(D): CPT | Performed by: SURGERY

## 2022-10-26 PROCEDURE — 88305 TISSUE EXAM BY PATHOLOGIST: CPT | Mod: TC | Performed by: SURGERY

## 2022-10-26 PROCEDURE — 258N000003 HC RX IP 258 OP 636: Performed by: INTERNAL MEDICINE

## 2022-10-26 PROCEDURE — 80053 COMPREHEN METABOLIC PANEL: CPT | Performed by: PHYSICIAN ASSISTANT

## 2022-10-26 PROCEDURE — 86850 RBC ANTIBODY SCREEN: CPT | Performed by: SURGERY

## 2022-10-26 PROCEDURE — 370N000017 HC ANESTHESIA TECHNICAL FEE, PER MIN: Performed by: SURGERY

## 2022-10-26 PROCEDURE — 85610 PROTHROMBIN TIME: CPT | Performed by: INTERNAL MEDICINE

## 2022-10-26 PROCEDURE — 250N000009 HC RX 250: Performed by: NURSE ANESTHETIST, CERTIFIED REGISTERED

## 2022-10-26 PROCEDURE — 99232 SBSQ HOSP IP/OBS MODERATE 35: CPT | Performed by: INTERNAL MEDICINE

## 2022-10-26 PROCEDURE — C9113 INJ PANTOPRAZOLE SODIUM, VIA: HCPCS | Performed by: PHYSICIAN ASSISTANT

## 2022-10-26 PROCEDURE — 36415 COLL VENOUS BLD VENIPUNCTURE: CPT | Performed by: PHYSICIAN ASSISTANT

## 2022-10-26 PROCEDURE — 250N000013 HC RX MED GY IP 250 OP 250 PS 637: Performed by: PHYSICIAN ASSISTANT

## 2022-10-26 PROCEDURE — 85007 BL SMEAR W/DIFF WBC COUNT: CPT | Performed by: PHYSICIAN ASSISTANT

## 2022-10-26 PROCEDURE — P9059 PLASMA, FRZ BETWEEN 8-24HOUR: HCPCS

## 2022-10-26 PROCEDURE — 120N000001 HC R&B MED SURG/OB

## 2022-10-26 PROCEDURE — 43239 EGD BIOPSY SINGLE/MULTIPLE: CPT | Performed by: SURGERY

## 2022-10-26 RX ORDER — LISINOPRIL 10 MG/1
10 TABLET ORAL DAILY
Status: DISCONTINUED | OUTPATIENT
Start: 2022-10-26 | End: 2022-11-03 | Stop reason: HOSPADM

## 2022-10-26 RX ORDER — NALOXONE HYDROCHLORIDE 0.4 MG/ML
0.4 INJECTION, SOLUTION INTRAMUSCULAR; INTRAVENOUS; SUBCUTANEOUS
Status: DISCONTINUED | OUTPATIENT
Start: 2022-10-26 | End: 2022-11-03 | Stop reason: HOSPADM

## 2022-10-26 RX ORDER — SODIUM CHLORIDE, SODIUM LACTATE, POTASSIUM CHLORIDE, CALCIUM CHLORIDE 600; 310; 30; 20 MG/100ML; MG/100ML; MG/100ML; MG/100ML
INJECTION, SOLUTION INTRAVENOUS CONTINUOUS
Status: DISCONTINUED | OUTPATIENT
Start: 2022-10-26 | End: 2022-11-03 | Stop reason: HOSPADM

## 2022-10-26 RX ORDER — ATORVASTATIN CALCIUM 80 MG/1
80 TABLET, FILM COATED ORAL DAILY
Status: DISCONTINUED | OUTPATIENT
Start: 2022-10-26 | End: 2022-11-03 | Stop reason: HOSPADM

## 2022-10-26 RX ORDER — DEXTROSE MONOHYDRATE 25 G/50ML
25-50 INJECTION, SOLUTION INTRAVENOUS
Status: DISCONTINUED | OUTPATIENT
Start: 2022-10-26 | End: 2022-11-03 | Stop reason: HOSPADM

## 2022-10-26 RX ORDER — NALOXONE HYDROCHLORIDE 0.4 MG/ML
0.2 INJECTION, SOLUTION INTRAMUSCULAR; INTRAVENOUS; SUBCUTANEOUS
Status: DISCONTINUED | OUTPATIENT
Start: 2022-10-26 | End: 2022-11-03 | Stop reason: HOSPADM

## 2022-10-26 RX ORDER — PROCHLORPERAZINE MALEATE 5 MG
5 TABLET ORAL EVERY 6 HOURS PRN
Status: DISCONTINUED | OUTPATIENT
Start: 2022-10-26 | End: 2022-11-03 | Stop reason: HOSPADM

## 2022-10-26 RX ORDER — TAMSULOSIN HYDROCHLORIDE 0.4 MG/1
0.4 CAPSULE ORAL DAILY
Status: DISCONTINUED | OUTPATIENT
Start: 2022-10-26 | End: 2022-11-03 | Stop reason: HOSPADM

## 2022-10-26 RX ORDER — TRANEXAMIC ACID 10 MG/ML
1 INJECTION, SOLUTION INTRAVENOUS ONCE
Status: COMPLETED | OUTPATIENT
Start: 2022-10-26 | End: 2022-10-26

## 2022-10-26 RX ORDER — PROPOFOL 10 MG/ML
INJECTION, EMULSION INTRAVENOUS CONTINUOUS PRN
Status: DISCONTINUED | OUTPATIENT
Start: 2022-10-26 | End: 2022-10-26

## 2022-10-26 RX ORDER — LIDOCAINE 40 MG/G
CREAM TOPICAL
Status: DISCONTINUED | OUTPATIENT
Start: 2022-10-26 | End: 2022-10-26 | Stop reason: HOSPADM

## 2022-10-26 RX ORDER — ONDANSETRON 2 MG/ML
4 INJECTION INTRAMUSCULAR; INTRAVENOUS EVERY 6 HOURS PRN
Status: DISCONTINUED | OUTPATIENT
Start: 2022-10-26 | End: 2022-11-03 | Stop reason: HOSPADM

## 2022-10-26 RX ORDER — PROCHLORPERAZINE 25 MG
12.5 SUPPOSITORY, RECTAL RECTAL EVERY 12 HOURS PRN
Status: DISCONTINUED | OUTPATIENT
Start: 2022-10-26 | End: 2022-11-03 | Stop reason: HOSPADM

## 2022-10-26 RX ORDER — HYDROMORPHONE HCL IN WATER/PF 6 MG/30 ML
.2-.5 PATIENT CONTROLLED ANALGESIA SYRINGE INTRAVENOUS
Status: DISCONTINUED | OUTPATIENT
Start: 2022-10-26 | End: 2022-11-03 | Stop reason: HOSPADM

## 2022-10-26 RX ORDER — OXYCODONE HYDROCHLORIDE 5 MG/1
5 TABLET ORAL EVERY 4 HOURS PRN
Status: DISCONTINUED | OUTPATIENT
Start: 2022-10-26 | End: 2022-11-03 | Stop reason: HOSPADM

## 2022-10-26 RX ORDER — GLYCOPYRROLATE 0.2 MG/ML
INJECTION, SOLUTION INTRAMUSCULAR; INTRAVENOUS PRN
Status: DISCONTINUED | OUTPATIENT
Start: 2022-10-26 | End: 2022-10-26

## 2022-10-26 RX ORDER — ONDANSETRON 4 MG/1
4 TABLET, ORALLY DISINTEGRATING ORAL EVERY 6 HOURS PRN
Status: DISCONTINUED | OUTPATIENT
Start: 2022-10-26 | End: 2022-11-03 | Stop reason: HOSPADM

## 2022-10-26 RX ORDER — CLOPIDOGREL BISULFATE 75 MG/1
75 TABLET ORAL DAILY
Status: DISCONTINUED | OUTPATIENT
Start: 2022-10-26 | End: 2022-11-03 | Stop reason: HOSPADM

## 2022-10-26 RX ORDER — NICOTINE POLACRILEX 4 MG
15-30 LOZENGE BUCCAL
Status: DISCONTINUED | OUTPATIENT
Start: 2022-10-26 | End: 2022-11-03 | Stop reason: HOSPADM

## 2022-10-26 RX ORDER — LIDOCAINE 40 MG/G
CREAM TOPICAL
Status: DISCONTINUED | OUTPATIENT
Start: 2022-10-26 | End: 2022-11-01

## 2022-10-26 RX ADMIN — TAMSULOSIN HYDROCHLORIDE 0.4 MG: 0.4 CAPSULE ORAL at 13:21

## 2022-10-26 RX ADMIN — HYDROMORPHONE HYDROCHLORIDE 0.2 MG: 0.2 INJECTION, SOLUTION INTRAMUSCULAR; INTRAVENOUS; SUBCUTANEOUS at 08:14

## 2022-10-26 RX ADMIN — PROPOFOL 25 MCG/KG/MIN: 10 INJECTION, EMULSION INTRAVENOUS at 10:57

## 2022-10-26 RX ADMIN — MIDAZOLAM 2 MG: 1 INJECTION INTRAMUSCULAR; INTRAVENOUS at 10:55

## 2022-10-26 RX ADMIN — SODIUM CHLORIDE, POTASSIUM CHLORIDE, SODIUM LACTATE AND CALCIUM CHLORIDE: 600; 310; 30; 20 INJECTION, SOLUTION INTRAVENOUS at 13:19

## 2022-10-26 RX ADMIN — ONDANSETRON 4 MG: 2 INJECTION INTRAMUSCULAR; INTRAVENOUS at 08:15

## 2022-10-26 RX ADMIN — PHYTONADIONE 2 MG: 2 INJECTION, EMULSION INTRAMUSCULAR; INTRAVENOUS; SUBCUTANEOUS at 13:53

## 2022-10-26 RX ADMIN — ATORVASTATIN CALCIUM 80 MG: 80 TABLET, FILM COATED ORAL at 13:21

## 2022-10-26 RX ADMIN — PANTOPRAZOLE SODIUM 40 MG: 40 INJECTION, POWDER, FOR SOLUTION INTRAVENOUS at 13:22

## 2022-10-26 RX ADMIN — SODIUM CHLORIDE, POTASSIUM CHLORIDE, SODIUM LACTATE AND CALCIUM CHLORIDE: 600; 310; 30; 20 INJECTION, SOLUTION INTRAVENOUS at 01:25

## 2022-10-26 RX ADMIN — GLYCOPYRROLATE 0.3 MG: 0.2 INJECTION, SOLUTION INTRAMUSCULAR; INTRAVENOUS at 11:09

## 2022-10-26 RX ADMIN — TOPICAL ANESTHETIC 2 SPRAY: 200 SPRAY DENTAL; PERIODONTAL at 10:57

## 2022-10-26 RX ADMIN — PREGABALIN 150 MG: 100 CAPSULE ORAL at 17:51

## 2022-10-26 RX ADMIN — LISINOPRIL 10 MG: 10 TABLET ORAL at 13:21

## 2022-10-26 RX ADMIN — METOPROLOL SUCCINATE 75 MG: 25 TABLET, EXTENDED RELEASE ORAL at 13:20

## 2022-10-26 RX ADMIN — METOPROLOL SUCCINATE 75 MG: 25 TABLET, EXTENDED RELEASE ORAL at 19:26

## 2022-10-26 RX ADMIN — PREGABALIN 150 MG: 100 CAPSULE ORAL at 01:25

## 2022-10-26 RX ADMIN — TAZOBACTAM SODIUM AND PIPERACILLIN SODIUM 4.5 G: 500; 4 INJECTION, SOLUTION INTRAVENOUS at 17:59

## 2022-10-26 RX ADMIN — TRANEXAMIC ACID 1 G: 10 INJECTION, SOLUTION INTRAVENOUS at 19:23

## 2022-10-26 ASSESSMENT — ACTIVITIES OF DAILY LIVING (ADL)
ADLS_ACUITY_SCORE: 26

## 2022-10-26 ASSESSMENT — ENCOUNTER SYMPTOMS: DYSRHYTHMIAS: 1

## 2022-10-26 NOTE — ANESTHESIA CARE TRANSFER NOTE
Patient: Antoine Russo    Procedure: Procedure(s):  ESOPHAGOGASTRODUODENOSCOPY, WITH BIOPSY       Diagnosis: Acute gastritis without hemorrhage, unspecified gastritis type [K29.00]  Diagnosis Additional Information: No value filed.    Anesthesia Type:   General     Note:    Oropharynx: oropharynx clear of all foreign objects and spontaneously breathing  Level of Consciousness: awake and drowsy  Oxygen Supplementation: room air    Independent Airway: airway patency satisfactory and stable  Dentition: dentition unchanged  Vital Signs Stable: post-procedure vital signs reviewed and stable  Report to RN Given: handoff report given  Patient transferred to: Phase II    Handoff Report: Identifed the Patient, Identified the Reponsible Provider, Reviewed the pertinent medical history, Discussed the surgical course, Reviewed Intra-OP anesthesia mangement and issues during anesthesia, Set expectations for post-procedure period and Allowed opportunity for questions and acknowledgement of understanding      Vitals:  Vitals Value Taken Time   BP     Temp     Pulse     Resp     SpO2         Electronically Signed By: Sky Voss CRNA, FERNANDO MARCUS  October 26, 2022  11:20 AM

## 2022-10-26 NOTE — ANESTHESIA POSTPROCEDURE EVALUATION
Patient: Antoine Russo    Procedure: Procedure(s):  ESOPHAGOGASTRODUODENOSCOPY, WITH BIOPSY       Anesthesia Type:  General    Note:  Disposition: Inpatient   Postop Pain Control: Uneventful            Sign Out: Well controlled pain   PONV: No   Neuro/Psych: Uneventful            Sign Out: Acceptable/Baseline neuro status   Airway/Respiratory: Uneventful            Sign Out: Acceptable/Baseline resp. status   CV/Hemodynamics: Uneventful            Sign Out: Acceptable CV status; No obvious hypovolemia; No obvious fluid overload   Other NRE: NONE   DID A NON-ROUTINE EVENT OCCUR? No           Last vitals:  Vitals:    10/26/22 0406 10/26/22 0800 10/26/22 0917   BP: 131/62 (!) 153/85 (!) 155/86   Pulse:  82 91   Resp: 16 18 16   Temp: 36.4  C (97.5  F) 36.2  C (97.2  F) 37  C (98.6  F)   SpO2: 93% 93% 93%       Electronically Signed By: Sky Voss CRNA, FERNANDO MARCUS  October 26, 2022  11:20 AM

## 2022-10-26 NOTE — PROGRESS NOTES
"[facility] [place]MISSION NOTE    Patient admitted to room 2314 at approximately 2130 via cart from emergency room. Patient was accompanied by transport tech.     Verbal SBAR report received from FRANCHESCA Simon prior to patient arrival.     Patient ambulated to bed independently. Patient alert and oriented X 3. Pain is controlled with current analgesics.  Medication(s) being used: narcotic analgesics including dilaudid.  . Admission vital signs: Blood pressure 131/62, pulse 90, temperature 97.5  F (36.4  C), temperature source Oral, resp. rate 16, height 1.778 m (5' 10\"), weight 91.8 kg (202 lb 6.1 oz), SpO2 93 %. Patient was oriented to plan of care, call light, bed controls, tv, telephone, bathroom and visiting hours.     Risk Assessment    The following safety risks were identified during admission: fall, skin and isolation. Yellow risk band applied: NO.     Skin Initial Assessment    This writer admitted this patient and completed a full skin assessment and Romain score in the Adult PCS flowsheet. Appropriate interventions initiated as needed.     Secondary skin check declined.    Romain Risk Assessment  Sensory Perception: 3-->slightly limited  Moisture: 4-->rarely moist  Activity: 4-->walks frequently  Mobility: 4-->no limitation  Nutrition: 3-->adequate  Friction and Shear: 3-->no apparent problem  Romain Score: 21  Mattress: Standard Hospital Mattress (Foam)  Bed Frame: Standard width and length    Education    Patient has a [place] to Observation order: No  Observation education completed and documented: N/A      Delphine Graves RN    "

## 2022-10-26 NOTE — PROGRESS NOTES
"[facility]    Hospitalist Progress Note    Date of Service (when I saw the patient): 10/26/2022    Assessment & Plan   Antoine Russo is a 76 year old male admitted on 10/25/2022. He presented to the emergency department for evaluation of epigastric pain and was found to have evidence of gastritis on imaging and concern for possible ulcer for which he is being admitted for further evaluation and treatment.     Acute gastritis without hemorrhage, concern for possible peptic ulcer disease  Presented with epigastric pain, CT abdomen & pelvis demonstrates \"Diffuse gastric wall thickening has increased since the previous exam and there is tubular new fluid density along the lesser curvature of the stomach. Differential would include deep ulcer or abscess, although the mucosa appears intact overlying this. This has developed since 9/13/2022. Given the fluid and inflammation surrounding the stomach and the fact that it abuts the resection margin of the pancreas, pancreatitis predominantly involving inflammation of the stomach cannot be excluded, but is considered unlikely. Endoscopic ultrasound may be useful for further evaluation...\" Admit hemoglobin normal (15.6), no evidence of bleeding.   Patient's personal risk factors for gastritis include near daily alcohol use, new clopidogrel use, and coumadin use.   Case discussed between emergency department and general surgery.  - INR reversal prior to EGD, given vitamin K  - General Surgery consult  - EGD demonstrates diffusely erythematous gastric mucosa, with bleeding in gastric antrum with pus.    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  He has history of necrotizing pancreatitis, pancreatic duct leak, and a history of a stent in 2018, since removed. Patient does admit to near daily drinking. CT abdomen & pelvis demonstrates \"... Given the fluid and inflammation surrounding the stomach and the fact that it abuts the resection margin of the pancreas, pancreatitis predominantly involving inflammation of the stomach cannot be excluded, but is considered unlikely. Endoscopic ultrasound may be useful for further evaluation... 3.  Splenectomy and distal pancreatectomy. No evidence for inflammation of the residual pancreas.\" Admit lipase 200.   - LR @ 100 ml/hr  - NPO  - Antiemetics and analgesia noted above     Renal cysts  Incidental finding on CT abdomen & pelvis \"2.  Multiple cysts are again seen throughout both kidneys. One hemorrhagic cyst versus solid mass arising from the lateral right kidney is unchanged. MRI could further evaluate if indicated.\"   - Consider renal MRI as outpatient      Chronic atrial fibrillation   Long term current use of anticoagulant therapy / Drug Induced Coagulation Defect  Rate controlled prior to admission with metoprolol XL 75 mg bid. Anticoagulated prior to admission with coumadin, admit INR 1.81.   - Continue metoprolol   - INR reversal prior to EGD     Alcohol abuse  Patient admits to near daily alcohol use, \"a few beers a day.\" Last drink on 10/22. No evidence of withdrawal at time of admission.  - No CIWA indicated, but monitor for evidence of withdrawal     Type 2 diabetes mellitus with diabetic polyneuropathy, without long-term current use of insulin (H)  HgbA1c 8.2. Managed prior to admission with Jardiance 10 mg daily and Novolin NPH 40 U q am / 32 U q pm. Patient has not been using his insulin recently due to poor oral intake.   - Hold Novolin NPH while NPO  - Hold Jardiance  - High sliding scale insulin (NPO regimen)  - Hyper/hypoglycemia protocols     CAD " (coronary artery disease)  Peripheral vascular disease / SFA disease  Hyperlipidemia LDL goal <100  Drug Induced Platelet Defect  Known PVD, follows with vascular Dr. Stacy. Recent left lower extremity angiogram on 9/30/22. Known CAD on prior stress tests, but has never had PCI or CABG interventions. Managed prior to admission with atorvastatin 80 mg daily and clopidogrel 75 mg daily.   - Continue statin  - Clopidogrel on hold      Leukocytosis with left shift  Spleen absent  Admit WBC 16.0, ANC 13.9. 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 Unclear if he is up to date on immunizations. No indication for antibiotics at present.  - Monitor  - CBC in am      Hypertension, benign essential, goal below 140/90  Blood pressure intermittently elevated. Managed prior to admission with lisinopril 10 mg daily and metoprolol XL 75 mg bid.   - Continue lisinopril and metoprolol with holding parameters     Hypertrophy of prostate without urinary obstruction  Managed prior to admission with Flomax 0.4 mg daily, continue.      GERD (gastroesophageal reflux disease)  Noted on problem list, not on PPI or H2 blocker prior to admission.  - Start PPI as above     Peripheral polyneuropathy  Managed prior to admission with Lyrica 150 mg q pm, continue.      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 Not acute. Continue with outpatient management.    Diet: NPO per Anesthesia Guidelines for Procedure/Surgery Except for: Meds  DVT Prophylaxis: Warfarin prior to admission, currently undergoing INR reversal  Chavez Catheter: Not present  Central Lines: None  Cardiac Monitoring: None  Code Status: Full Code    Disposition: Patient is currently awaiting transfer for advanced endoscopy.    Yassine Wright MD    Interval History   The patient is resting in bed.  He complains of  moderate abdominal pain.  He denies fevers or chills.    -Data reviewed today: I reviewed all new labs and imaging results over the last 24 hours. I personally reviewed no images or EKG's today.    Physical Exam   Temp: 97.8  F (36.6  C) Temp src: Oral BP: (!) 155/74 Pulse: 88   Resp: 20 SpO2: 96 % O2 Device: None (Room air) Oxygen Delivery: 2 LPM  Vitals:    10/25/22 0855 10/25/22 2123   Weight: 90.7 kg (200 lb) 91.8 kg (202 lb 6.1 oz)     Vital Signs with Ranges  Temp:  [97.2  F (36.2  C)-98.6  F (37  C)] 97.8  F (36.6  C)  Pulse:  [] 88  Resp:  [12-20] 20  BP: (127-158)/(51-98) 155/74  MAP:  [102 mmHg-124 mmHg] 124 mmHg  SpO2:  [90 %-97 %] 96 %  I/O last 3 completed shifts:  In: 100 [I.V.:100]  Out: -     Gen: Well nourished, well developed, alert and oriented x 3, no acute distressed  HEENT: Atraumatic, normocephalic; sclera non-injected, anicterric; oral mucosa moist, no lesion, no exudate  Lungs: Clear to ausculation, no wheezes, no rhonchi, no rales  Heart: Regular rate, regular rhythm, no gallops, no rubs, no murmurs  GI: Bowel sound decreased, no hepatosplenomegaly, no masses, minimally tender, non-distended, no guarding, no rebound tenderness  Lymph: No lymphadenopathy, no edema  Skin: No rashes, no chronic venous stasis     Medications     lactated ringers 100 mL/hr at 10/26/22 1319       atorvastatin  80 mg Oral Daily     [Held by provider] clopidogrel  75 mg Oral Daily     [Held by provider] empagliflozin  10 mg Oral Daily     insulin aspart  1-12 Units Subcutaneous Q4H     lisinopril  10 mg Oral Daily     metoprolol succinate ER  75 mg Oral BID     pantoprazole  40 mg Intravenous Q12H     piperacillin-tazobactam  4.5 g Intravenous Q6H     pregabalin  150 mg Oral QPM     sodium chloride (PF)  3 mL Intracatheter Q8H     tamsulosin  0.4 mg Oral Daily     tranexamic acid  1 g Intravenous Once       Data   Recent Labs   Lab 10/26/22  1355 10/26/22  0832 10/26/22  0574 10/26/22  3144 10/25/22  1155  10/25/22  0920 10/22/22  2150   WBC  --   --  14.5*  --   --  16.0* 13.3*   HGB 14.9  --  15.3  --   --  15.6 16.0   MCV  --   --  104*  --   --  102* 100   PLT  --   --  147*  --   --  169 197   INR 1.35*  --  1.55*  --   --  1.81*  --    NA  --   --  137  --   --  135* 136   POTASSIUM  --   --  4.4  --   --  4.7 5.1   CHLORIDE  --   --  100  --   --  96* 101   CO2  --   --  23  --   --  24 22   BUN  --   --  17.1  --   --  20.1 20.9   CR  --   --  0.87  --   --  0.94 0.87   ANIONGAP  --   --  14  --   --  15 13   NILSON  --   --  9.3  --   --  9.7 9.4   GLC  --  103* 110* 99   < > 165* 145*   ALBUMIN  --   --  2.9*  --   --  3.4* 3.5   PROTTOTAL  --   --  6.5  --   --  7.3 7.4   BILITOTAL  --   --  1.8*  --   --  1.4* 0.9   ALKPHOS  --   --  69  --   --  75 73   ALT  --   --  14  --   --  19 24   AST  --   --  24  --   --  24 43   LIPASE  --   --   --   --   --  200* 190*    < > = values in this interval not displayed.       No results found for this or any previous visit (from the past 24 hour(s)).

## 2022-10-26 NOTE — CONSULTS
Care Management team placed automatic self referral due to elevated CARLOS ENRIQUE score.       Per IDT rounds, EMR review, and/or discussion with MD at 2 PM huddle, it has been determined that there are no care management needs at this time.     Care Management will close referral at this time, but please place new consult should pt develop new needs during this stay.    Maryjane Mejias RN

## 2022-10-26 NOTE — PLAN OF CARE
AOx4, up independently in room. LR @ 100ml/hr, prn zofran x 1, dilaudid x 1 with adequate relief of pain. Q4hr blood sugars 114/99, no insulin given. NPO since arrival to floor, calls appropriately, rested comfortably throughout shift.

## 2022-10-26 NOTE — CONSULTS
"Dear Dr. 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    He does drink about 2 beers a day.   Stopped smoking about 20 years ago.   Has had an oral cancer. And has had skin graft done for covering it at the base of the tongue.   He devolped c diff colitis and had some colon removed and an ostomy that was reversed 6 months later.     HPI:  Patient is a 76 year old male  with complaints epigastric pain.  Ct scan showed possible ulcer in the stomach.  He has a slightly elevated lipase also.   10 Point review of systems all others are negative.   BP (!) 155/86 (BP Location: Left arm)   Pulse 91   Temp 98.6  F (37  C) (Oral)   Resp 16   Ht 1.778 m (5' 10\")   Wt 91.8 kg (202 lb 6.1 oz)   SpO2 93%   BMI 29.04 kg/m      Past Medical History:   Diagnosis Date     Acute kidney injury (H) 04/17/2019     Acute on chronic pancreatitis (H) 01/23/2018     Bacteriuria with pyuria 04/17/2019     C. difficile colitis      Chronic atrial fibrillation (H)     On chronic anticoagulation with coumadin     Colon polyp 08/26/2011    Colonoscopy 8/2011-A sessile polyp was found in the cecum. The polyp was 6 mm in size. The polyp was removed with a hot snare. Resection and retrieval were complete. A sessile polyp was found in the proximal transverse colon. The polyp was 15 mm in size. The polyp was removed with a hot snare. Resection and retrieval were complete. A sessile polyp was found in the sigmoid colon. 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, N0, M0 carcinoma, underwent transcervical glossectomy, floor of mouth excision, BL neck dissection, adj radiation, and skin flap reconstruction     Recurrent pancreatitis     alcoholic pancreatitis       Past Surgical History:   Procedure " Laterality Date     BREAST SURGERY  01/01/2008    right breast mass benign     COLECTOMY SUBTOTAL  2013    With diverting ostomy creation; done for toxic C difficile colitis     COLONOSCOPY      multiple polyps removed     COLONOSCOPY  08/24/2011    TUMOR/POLYP/LESION BY SNARE     COLONOSCOPY  12/17/2012    COLONOSCOPY     COLONOSCOPY  12/18/2012    COLONOSCOPY     DENERVATION OF SPERMATIC CORD MICROSURGICAL Left 05/23/2017    Procedure: DENERVATION OF SPERMATIC CORD MICROSURGICAL;;  Surgeon: Marcio Aggarwal MD;  [facility]     DISSECTION RADICAL NECK BILATERAL  08/02/2012    Procedure: DISSECTION RADICAL NECK BILATERAL;;  Surgeon: Yung Alvares MD;  [facility]: [facility]     ENDOSCOPIC RETROGRADE CHOLANGIOPANCREATOGRAM N/A 05/10/2016    Procedure: COMBINED ENDOSCOPIC RETROGRADE CHOLANGIOPANCREATOGRAPHY, PLACE TUBE/STENT;  Surgeon: Yovanny Beasley MD;  [facility]     ENDOSCOPIC RETROGRADE CHOLANGIOPANCREATOGRAM N/A 03/29/2018    Procedure: ENDOSCOPIC RETROGRADE CHOLANGIOPANCREATOGRAM;  Endoscopic Retrograde Cholangiopancreatogram, Endoscopic Ultrasound, Biliary Sphincterotomy, Biliary and Pancreatic Stent Placement;  Surgeon: Yovanny Beasley MD;  [facility]     ENDOSCOPIC ULTRASOUND UPPER GASTROINTESTINAL TRACT (GI) N/A 02/03/2016    Procedure: ENDOSCOPIC ULTRASOUND, ESOPHAGOSCOPY / UPPER GASTROINTESTINAL TRACT (GI);  Surgeon: Grabiel Plata MD;  [facility]     ENDOSCOPIC ULTRASOUND UPPER GASTROINTESTINAL TRACT (GI) N/A 03/29/2018    Procedure: ENDOSCOPIC ULTRASOUND, ESOPHAGOSCOPY / UPPER GASTROINTESTINAL TRACT (GI);;  Surgeon: Grabiel Plata MD;  [facility]     ESOPHAGOSCOPY, GASTROSCOPY, DUODENOSCOPY (EGD), COMBINED N/A 02/03/2016    Procedure: COMBINED ENDOSCOPIC ULTRASOUND, ESOPHAGOSCOPY, GASTROSCOPY, DUODENOSCOPY (EGD), FINE NEEDLE ASPIRATE/BIOPSY;  Surgeon: Grabiel Plata MD;  [facility]     ESOPHAGOSCOPY, GASTROSCOPY, DUODENOSCOPY (EGD),  COMBINED N/A 06/08/2016    Procedure: COMBINED ESOPHAGOSCOPY, GASTROSCOPY, DUODENOSCOPY (EGD), REMOVE FOREIGN BODY;  Surgeon: Yovanny Beasley MD;  [facility]     ESOPHAGOSCOPY, GASTROSCOPY, DUODENOSCOPY (EGD), COMBINED N/A 04/17/2018    Procedure: COMBINED ESOPHAGOSCOPY, GASTROSCOPY, DUODENOSCOPY (EGD), REMOVE FOREIGN BODY;  EGD with stent removal;  Surgeon: Grabiel Plata MD;  [facility]     EXCISE LESION INTRAORAL  06/14/2012    Procedure: EXCISE LESION INTRAORAL;  Wide Local Excision Floor of Mouth, Direct Laryngoscopy, Bilateral Ida's Marsuplization, Split Thickness Skin Graft from right Thigh  Latex Safe;  Surgeon: Gerson Ravi MD;  [facility]     EXCISE LESION INTRAORAL  08/02/2012    Procedure: EXCISE LESION INTRAORAL;  Floor of Mouth Resection, Bilateral Selective Radical Neck Dissection, Tracheostomy, Left Radial Forearm  Free Flap with Alloderm, Nasogastric Feeding Tube Placement,    * Latex Safe*;  Surgeon: Gerson Ravi MD;  [facility]     EXCISE LESION INTRAORAL  12/11/2012    Procedure: EXCISE LESION INTRAORAL;  takedown of oral flap;  Surgeon: Yung Alvares MD;  [facility]     GRAFT FREE VASCULARIZED (LOCATION)  08/02/2012    Procedure: GRAFT FREE VASCULARIZED (LOCATION);;  Surgeon: Yung Alvares MD;  [facility]     GRAFT SKIN SPLIT THICKNESS FROM EXTREMITY  06/14/2012    Procedure: GRAFT SKIN SPLIT THICKNESS FROM EXTREMITY;;  Surgeon: Gerson Ravi MD;  [facility]     IR LOWER EXTREMITY ANGIOGRAM LEFT  9/30/2022     LAPAROSCOPIC ILEOSTOMY TAKEDOWN  06/06/2013    LAPAROSCOPIC ILEOSTOMY TAKEDOWN     LAPAROTOMY EXPLORATORY  12/20/2012    LAPAROTOMY EXPLORATORY with Colectomy     LARYNGOSCOPY  06/14/2012    Procedure: LARYNGOSCOPY;;  Surgeon: Gerson Ravi MD;  [facility]     ORTHOPEDIC SURGERY      ganglian cyst left ankle     PANCREATECTOMY, SPLENECTOMY N/A 03/10/2016    Procedure: PANCREATECTOMY, SPLENECTOMY;  Surgeon: Nael Abel  MD;  [facility]     SHOULDER SURGERY  2006, 2008    2006- right rotator cuff, 2008 bone spur on left. Dr. Hdez     VARICOCELECTOMY Left 05/23/2017    Procedure: VARICOCELECTOMY;  Left Varicocele Repair, Denervation of Left Testis;  Surgeon: Marcio Aggarwal MD;  Location:  OR       Social History     Socioeconomic History     Marital status:      Spouse name: Lily     Number of children: Not on file     Years of education: Not on file     Highest education level: Not on file   Occupational History     Occupation: [employer]     Comment: 20 hr/week monday-Thur 7-noon     Occupation: [place]      Employer: [employer]     Occupation: RETIRED   Tobacco Use     Smoking status: Former     Packs/day: 1.00     Years: 40.00     Pack years: 40.00     Types: Cigarettes     Quit date: 11/24/2006     Years since quitting: 15.9     Smokeless tobacco: Never   Vaping Use     Vaping Use: Never used   Substance and Sexual Activity     Alcohol use: Yes     Drug use: Never     Sexual activity: Yes     Partners: Female   Other Topics Concern      Service Yes     Comment: [employer] 6 years     Blood Transfusions No     Caffeine Concern Yes     Comment: 0-2 cups     Occupational Exposure No     Comment: retired     Hobby Hazards No     Sleep Concern No     Stress Concern No     Weight Concern No     Special Diet Yes     Comment: healthy     Back Care No     Exercise Yes     Bike Helmet Not Asked     Comment: N/A     Seat Belt Yes     Self-Exams Yes     Parent/sibling w/ CABG, MI or angioplasty before 65F 55M? No   Social History Narrative    Son lives in [place] with 2 grandchildren 19 and 6     Social Determinants of Health     Financial Resource Strain: Not on file   Food Insecurity: Not on file   Transportation Needs: Not on file   Physical Activity: Not on file   Stress: Not on file   Social Connections: Not on file   Intimate Partner Violence: Not on file   Housing  "Stability: Not on file       No current outpatient medications on file.       Above was reviewed  Physical exam: BP (!) 155/86 (BP Location: Left arm)   Pulse 91   Temp 98.6  F (37  C) (Oral)   Resp 16   Ht 1.778 m (5' 10\")   Wt 91.8 kg (202 lb 6.1 oz)   SpO2 93%   BMI 29.04 kg/m      Patient is alert and orientated.   Head eyes, nose and mouth within normal limits.  .  Abdomen is abdomen is soft without significant tenderness, masses, organomegaly or guarding  bowel sounds are positive and no caput medusa noted.  Mild epigastric more to the left side then midline tenderness  .    Skin was warm and pink  Normal Affect      Lower extremity edema is not present.    Labs show:     Result Notes    Component Ref Range & Units  5:24 AM 1 d ago 4 d ago 6 d ago 3 wk ago 3 mo ago 5 mo ago    % Neutrophils % 86  87  80  69   64  68     % Lymphocytes % 9  6  7  19   23  21     % Monocytes % 5  5  11  9   10  11     % Eosinophils % 0  2  2  2   2  0     % Basophils % 0  0  0  0   1  0     Absolute Neutrophils 1.6 - 8.3 10e3/uL 12.5 High   13.9 High   10.6 High      6.3     Absolute Lymphocytes 0.8 - 5.3 10e3/uL 1.3  1.0  0.9     1.9     Absolute Monocytes 0.0 - 1.3 10e3/uL 0.7  0.8  1.5 High      1.0     Absolute Eosinophils 0.0 - 0.7 10e3/uL 0.0  0.3  0.3     0.0     Absolute Basophils 0.0 - 0.2 10e3/uL 0.0  0.0  0.0     0.0     RBC Morphology  Confirmed RBC Indices  Confirmed RBC Indices  Confirmed RBC Indices   Confirmed RBC Indices   Confirmed RBC Indices     Platelet Assessment Automated Count Confirmed. Platelet morphology is normal. Automated Count Confirmed. Giant platelets are present. Abnormal   Automated Count Confirmed. Giant platelets are present. Abnormal   Automated Count Confirmed. Giant platelets are present. Abnormal    Automated Count Confirmed. Platelet morphology is normal.   Automated Count Confirmed. Platelet morphology is normal.    Resulting Agency  [facility]              Specimen Collected: 10/26/22  5:24 AM Last Resulted: 10/26/22  5:59 AM                Notes     1 HM Topic    Component Ref Range & Units  5:24 AM   (10/26/22) 1 d ago   (10/25/22) 4 d ago   (10/22/22) 4 d ago   (10/22/22) 6 d ago   (10/20/22) 3 wk ago   (9/30/22) 3 mo ago   (7/19/22)    Sodium 136 - 145 mmol/L 137  135 Low   136   139  137  135 R     Potassium 3.4 - 5.3 mmol/L 4.4  4.7  5.1 CM   4.6  4.4      Chloride 98 - 107 mmol/L 100  96 Low   101   101  100      Carbon Dioxide (CO2) 22 - 29 mmol/L 23  24  22   27  27      Anion Gap 7 - 15 mmol/L 14  15  13   11  10  4 R     Urea Nitrogen 8.0 - 23.0 mg/dL 17.1  20.1  20.9   16.5  11.8      Creatinine 0.67 - 1.17 mg/dL 0.87  0.94  0.87   1.03  0.91  0.88 R     Calcium 8.8 - 10.2 mg/dL 9.3  9.7  9.4   9.3  9.5  9.6 R     Glucose 70 - 99 mg/dL 110 High   165 High   145 High    135 High   150 High       Alkaline Phosphatase 40 - 129 U/L 69  75   73  74   55 R     AST 10 - 50 U/L 24  24   43 CM  24   25 R     ALT 10 - 50 U/L 14  19   24  22   27 R     Protein Total 6.4 - 8.3 g/dL 6.5  7.3   7.4  6.8   7.3 R     Albumin 3.5 - 5.2 g/dL 2.9 Low   3.4 Low    3.5  3.7       Bilirubin Total <=1.2 mg/dL 1.8 High   1.4 High    0.9  0.8   0.7 R     GFR Estimate >60 mL/min/1.73m2 >90  85 CM  >90 CM   76 CM  88 CM  90 CM    Comment: Effective December 21, 2021 eGFRcr in adults is calculated using the 2021 CKD-EPI creatinine equation which includes age and gender (Vijaya sands al., NEJM, DOI: 10.1056/GRRTnv1337256)   Resulting Agency                 Final result      Visible to patient: Yes (not seen)      0 Result Notes    Component Ref Range & Units 1 d ago 4 d ago    Lipase 13 - 60 U/L 200 High   190 High     Resulting Agency  [facility]              Specimen Collected: 10/25/22  9:20 AM Last Resulted: 10/25/22  9:41 AM                  Narrative & Impression   CT ABDOMEN/PELVIS WITH CONTRAST October 25, 2022 10:18 AM     CLINICAL HISTORY: Left  upper quadrant abdominal pain, elevated lipase.     TECHNIQUE: CT scan of the abdomen and pelvis was performed following  injection of IV contrast. Multiplanar reformats were obtained. Dose  reduction techniques were used.  CONTRAST: 97 mL Isovue-370.      COMPARISON: 9/13/2022, 10/28/2020, and 10/17/2020.     FINDINGS:   LOWER CHEST: Atelectasis is seen in both lung bases.     HEPATOBILIARY: Normal.     PANCREAS: Distal pancreatectomy is unchanged in appearance. No  inflammatory change to indicate CT evidence for pancreatitis. No  evidence of mass or pancreatic ductal dilation.     SPLEEN: Spleen is surgically absent.     ADRENAL GLANDS: Normal.     KIDNEYS/BLADDER: Multiple cysts are again identified throughout both  kidneys. One exophytic cyst at the posterolateral aspect of the mid  right kidney measures 13 mm and is soft tissue density. This is  unchanged from the previous exam dating back to 2020 and likely  represents a hemorrhagic cyst. MRI could verify if indicated. No  hydronephrosis. No bladder wall thickening or mass.     BOWEL: Diffuse gastric wall thickening is increased from September 2022. Along the lesser curvature there are two likely metallic clips  that are unchanged from the previous exam, but adjacent to this there  is an elongated fluid density measuring roughly 25 x 17 x 45 mm. The  mucosa over this appears intact, but this could represent deep ulcer  or abscess. There is mild inflammatory change and edema surrounding  the stomach indicating gastritis. The stomach abuts the resection  margin of the pancreas and pancreatitis with inflammation surrounding  the stomach would be unlikely, but cannot be excluded. Endoscopic  ultrasound may be useful for further evaluation. There are a few  prominent to borderline enlarged gastrohepatic lymph nodes present as  well. Subtotal colectomy is noted.     PELVIC ORGANS: No evidence for free fluid or adenopathy in the pelvis.     ADDITIONAL FINDINGS: The  aorta is normal in caliber with extensive  atherosclerotic calcification. Several prominent retroperitoneal lymph  nodes are present.     MUSCULOSKELETAL: Degenerative changes are noted through the spine.                                                                      IMPRESSION:   1.  Diffuse gastric wall thickening has increased since the previous  exam and there is tubular new fluid density along the lesser curvature  of the stomach. Differential would include deep ulcer or abscess,  although the mucosa appears intact overlying this. This has developed  since 9/13/2022. Given the fluid and inflammation surrounding the  stomach and the fact that it abuts the resection margin of the  pancreas, pancreatitis predominantly involving inflammation of the  stomach cannot be excluded, but is considered unlikely. Endoscopic  ultrasound may be useful for further evaluation.  2.  Multiple cysts are again seen throughout both kidneys. One  hemorrhagic cyst versus solid mass arising from the lateral right  kidney is unchanged. MRI could further evaluate if indicated.  3.  Splenectomy and distal pancreatectomy. No evidence for  inflammation of the residual pancreas.     JENNIFER MODI MD          Assessment: ct scan suggesting gastric ulcer.     Plan to do EGD with possible biopsies.           The patient is an appropriate candidate to receive sedation.    Informed consent was discussed with the patient/family, including the risks, benefits, potential complications and any alternative options associated with sedation.    Patient assessment completed just prior to sedation and while under constant observation by the provider. Condition determined to be adequate for proceeding with sedation.    The specific risks for the procedure were discussed with the patient at the time of informed consent and include but are not limited to perforation which could require surgery, missing significant neoplasm or lesion, hemorrhage  and adverse sedative complication.    Time spent with the patient with greater that 50% of the time in discussion was 30 minutes.  In discussing the plan and why we are doing the procedure and with the emergency room doc last night and looking at the ct scan. .      Jonatan Celeste MD

## 2022-10-26 NOTE — TELEPHONE ENCOUNTER
Type of surgery: ESOPHAGOGASTRODUODENOSCOPY, WITH BIOPSY  Location of surgery: [facility] [place]  Date and time of surgery: 10/26/22  Surgeon:    Pre-Op Appt Date: emergent  Post-Op Appt Date: will call    Packet sent out: Not Applicable  Pre-cert/Authorization completed:    Date:

## 2022-10-26 NOTE — OR NURSING
Dr called and aware of inr this am and ok to proceed . Glucose 103. Iv patent. Ready for procedure.

## 2022-10-26 NOTE — ANESTHESIA PREPROCEDURE EVALUATION
Anesthesia Pre-Procedure Evaluation    Patient: Antoine Russo   MRN: [id number] : 1945        Procedure : Procedure(s):  ESOPHAGOGASTRODUODENOSCOPY, WITH BIOPSY          Past Medical History:   Diagnosis Date     Acute kidney injury (H) 2019     Acute on chronic pancreatitis (H) 2018     Bacteriuria with pyuria 2019     C. difficile colitis      Chronic atrial fibrillation (H)     On chronic anticoagulation with coumadin     Colon polyp 2011    Colonoscopy 2011-A sessile polyp was found in the cecum. The polyp was 6 mm in size. The polyp was removed with a hot snare. Resection and retrieval were complete. A sessile polyp was found in the proximal transverse colon. The polyp was 15 mm in size. The polyp was removed with a hot snare. Resection and retrieval were complete. A sessile polyp was found in the sigmoid colon. 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, N0, M0 carcinoma, underwent transcervical glossectomy, floor of mouth excision, BL neck dissection, adj radiation, and skin flap reconstruction     Recurrent pancreatitis     alcoholic pancreatitis      Past Surgical History:   Procedure Laterality Date     BREAST SURGERY  2008    right breast mass benign     COLECTOMY SUBTOTAL      With diverting ostomy creation; done for toxic C difficile colitis     COLONOSCOPY      multiple polyps removed     COLONOSCOPY  2011    TUMOR/POLYP/LESION BY SNARE     COLONOSCOPY  2012    COLONOSCOPY     COLONOSCOPY  2012    COLONOSCOPY     DENERVATION OF SPERMATIC CORD MICROSURGICAL Left 2017    Procedure: DENERVATION OF SPERMATIC CORD MICROSURGICAL;;  Surgeon: Marcio Aggarwal MD;  [facility]     DISSECTION RADICAL NECK BILATERAL  2012    Procedure: DISSECTION RADICAL NECK BILATERAL;;  Surgeon: Yung Alvares MD;  [facility]      ENDOSCOPIC RETROGRADE CHOLANGIOPANCREATOGRAM N/A 05/10/2016    Procedure: COMBINED ENDOSCOPIC RETROGRADE CHOLANGIOPANCREATOGRAPHY, PLACE TUBE/STENT;  Surgeon: Yovanny Beasley MD;  [facility]     ENDOSCOPIC RETROGRADE CHOLANGIOPANCREATOGRAM N/A 03/29/2018    Procedure: ENDOSCOPIC RETROGRADE CHOLANGIOPANCREATOGRAM;  Endoscopic Retrograde Cholangiopancreatogram, Endoscopic Ultrasound, Biliary Sphincterotomy, Biliary and Pancreatic Stent Placement;  Surgeon: Yovanny Beasley MD;  [facility]     ENDOSCOPIC ULTRASOUND UPPER GASTROINTESTINAL TRACT (GI) N/A 02/03/2016    Procedure: ENDOSCOPIC ULTRASOUND, ESOPHAGOSCOPY / UPPER GASTROINTESTINAL TRACT (GI);  Surgeon: Grabiel Plata MD;  [facility]     ENDOSCOPIC ULTRASOUND UPPER GASTROINTESTINAL TRACT (GI) N/A 03/29/2018    Procedure: ENDOSCOPIC ULTRASOUND, ESOPHAGOSCOPY / UPPER GASTROINTESTINAL TRACT (GI);;  Surgeon: Grabiel Plata MD;  [facility]     ESOPHAGOSCOPY, GASTROSCOPY, DUODENOSCOPY (EGD), COMBINED N/A 02/03/2016    Procedure: COMBINED ENDOSCOPIC ULTRASOUND, ESOPHAGOSCOPY, GASTROSCOPY, DUODENOSCOPY (EGD), FINE NEEDLE ASPIRATE/BIOPSY;  Surgeon: Grabiel Plata MD;  [facility]     ESOPHAGOSCOPY, GASTROSCOPY, DUODENOSCOPY (EGD), COMBINED N/A 06/08/2016    Procedure: COMBINED ESOPHAGOSCOPY, GASTROSCOPY, DUODENOSCOPY (EGD), REMOVE FOREIGN BODY;  Surgeon: Yovanny Beasley MD;  [facility]     ESOPHAGOSCOPY, GASTROSCOPY, DUODENOSCOPY (EGD), COMBINED N/A 04/17/2018    Procedure: COMBINED ESOPHAGOSCOPY, GASTROSCOPY, DUODENOSCOPY (EGD), REMOVE FOREIGN BODY;  EGD with stent removal;  Surgeon: Grabiel Plata MD;  [facility]     EXCISE LESION INTRAORAL  06/14/2012    Procedure: EXCISE LESION INTRAORAL;  Wide Local Excision Floor of Mouth, Direct Laryngoscopy, Bilateral Ida's Marsuplization, Split Thickness Skin Graft from right Thigh  Latex Safe;  Surgeon: Gerson Ravi MD;  [facility]      EXCISE LESION INTRAORAL  08/02/2012    Procedure: EXCISE LESION INTRAORAL;  Floor of Mouth Resection, Bilateral Selective Radical Neck Dissection, Tracheostomy, Left Radial Forearm  Free Flap with Alloderm, Nasogastric Feeding Tube Placement,    * Latex Safe*;  Surgeon: Gerson Ravi MD;  [facility]     EXCISE LESION INTRAORAL  12/11/2012    Procedure: EXCISE LESION INTRAORAL;  takedown of oral flap;  Surgeon: Yung Alvares MD;  [facility]     GRAFT FREE VASCULARIZED (LOCATION)  08/02/2012    Procedure: GRAFT FREE VASCULARIZED (LOCATION);;  Surgeon: Yung Alvares MD;  [facility]     GRAFT SKIN SPLIT THICKNESS FROM EXTREMITY  06/14/2012    Procedure: GRAFT SKIN SPLIT THICKNESS FROM EXTREMITY;;  Surgeon: Gerson Ravi MD;  [facility]     IR LOWER EXTREMITY ANGIOGRAM LEFT  9/30/2022     LAPAROSCOPIC ILEOSTOMY TAKEDOWN  06/06/2013    LAPAROSCOPIC ILEOSTOMY TAKEDOWN     LAPAROTOMY EXPLORATORY  12/20/2012    LAPAROTOMY EXPLORATORY with Colectomy     LARYNGOSCOPY  06/14/2012    Procedure: LARYNGOSCOPY;;  Surgeon: Gerson Ravi MD;  [facility]     ORTHOPEDIC SURGERY      ganglian cyst left ankle     PANCREATECTOMY, SPLENECTOMY N/A 03/10/2016    Procedure: PANCREATECTOMY, SPLENECTOMY;  Surgeon: Nael Abel MD;  [facility]     SHOULDER SURGERY  2006, 2008    2006- right rotator cuff, 2008 bone spur on left. Dr. Hdez     VARICOCELECTOMY Left 05/23/2017    Procedure: VARICOCELECTOMY;  Left Varicocele Repair, Denervation of Left Testis;  Surgeon: Marcio Aggarwal MD;  [facility]      Allergies   Allergen Reactions     Nkda [No Known Drug Allergies]       Social History     Tobacco Use     Smoking status: Former     Packs/day: 1.00     Years: 40.00     Pack years: 40.00     Types: Cigarettes     Quit date: 11/24/2006     Years since quitting: 15.9     Smokeless tobacco: Never   Substance Use Topics     Alcohol use: Yes      Wt Readings from Last 1 Encounters:   10/25/22 91.8 kg  (202 lb 6.1 oz)        Anesthesia Evaluation   Pt has had prior anesthetic.     History of anesthetic complications  - difficult airway.      ROS/MED HX  ENT/Pulmonary:       Neurologic:       Cardiovascular:     (+) Dyslipidemia hypertension--CAD ---dysrhythmias, a-fib,     METS/Exercise Tolerance:     Hematologic:       Musculoskeletal:       GI/Hepatic:     (+) GERD, liver disease,     Renal/Genitourinary:     (+) renal disease, type: CRI,     Endo:     (+) type II DM,     Psychiatric/Substance Use:     (+) alcohol abuse     Infectious Disease:       Malignancy:       Other:            Physical Exam    Airway        Mallampati: II   TM distance: > 3 FB   Neck ROM: full   Mouth opening: > 3 cm    Respiratory Devices and Support         Dental  no notable dental history         Cardiovascular   cardiovascular exam normal          Pulmonary   pulmonary exam normal                OUTSIDE LABS:  CBC:   Lab Results   Component Value Date    WBC 14.5 (H) 10/26/2022    WBC 16.0 (H) 10/25/2022    HGB 15.3 10/26/2022    HGB 15.6 10/25/2022    HCT 48.7 10/26/2022    HCT 48.1 10/25/2022     (L) 10/26/2022     10/25/2022     BMP:   Lab Results   Component Value Date     10/26/2022     (L) 10/25/2022    POTASSIUM 4.4 10/26/2022    POTASSIUM 4.7 10/25/2022    CHLORIDE 100 10/26/2022    CHLORIDE 96 (L) 10/25/2022    CO2 23 10/26/2022    CO2 24 10/25/2022    BUN 17.1 10/26/2022    BUN 20.1 10/25/2022    CR 0.87 10/26/2022    CR 0.94 10/25/2022     (H) 10/26/2022     (H) 10/26/2022     COAGS:   Lab Results   Component Value Date    PTT 30 09/30/2022    INR 1.55 (H) 10/26/2022     POC:   Lab Results   Component Value Date     (H) 02/18/2021     HEPATIC:   Lab Results   Component Value Date    ALBUMIN 2.9 (L) 10/26/2022    PROTTOTAL 6.5 10/26/2022    ALT 14 10/26/2022    AST 24 10/26/2022    ALKPHOS 69 10/26/2022    BILITOTAL 1.8 (H) 10/26/2022     OTHER:   Lab Results   Component Value  Date    PH 7.29 (L) 03/31/2018    LACT 1.9 08/30/2021    A1C 8.2 (H) 10/20/2022    NILSON 9.3 10/26/2022    PHOS 3.2 02/16/2021    MAG 1.9 02/17/2021    LIPASE 200 (H) 10/25/2022    AMYLASE 56 03/29/2018    TSH 1.48 10/20/2022    T4 1.20 12/18/2012    CRP 14.3 (H) 04/15/2022    SED 5 01/17/2019       Anesthesia Plan    ASA Status:  3   NPO Status:  NPO Appropriate    Anesthesia Type: General.      Maintenance: Balanced.        Consents    Anesthesia Plan(s) and associated risks, benefits, and realistic alternatives discussed. Questions answered and patient/representative(s) expressed understanding.    - Discussed:     - Discussed with:  Patient         Postoperative Care            Comments:                Sky Voss CRNA, APRN CRNA

## 2022-10-26 NOTE — PROGRESS NOTES
End Of Shift Note        Subjective/Objective:    Neuro: A&O x4    Cardiac: Afib, HTN,     Resp: LS clear, equal BILAT    GI/: Intermittent nausea and abdominal pain, chronic diarrhea  Endo: H/O splenectomy, colectomy, partial pancreectomy,     MSK: Steady gait, SBA    LDAs: PIV R. AC          Bed 2314 D.S.- I have an order for Tranexamic acid infusion.  waiting for FFP to come up from blood bank, do you still want this medication infused

## 2022-10-26 NOTE — UTILIZATION REVIEW
"Admission Status; Secondary Review Determination       Under the authority of the Utilization Management Committee, the utilization review process indicated a secondary review on the above patient. The review outcome is based on review of the medical records, discussions with staff, and applying clinical experience noted on the date of the review.     (x) Inpatient Status Appropriate - This patient's medical care is consistent with medical management for inpatient care and reasonable inpatient medical practice.     RATIONALE FOR DETERMINATION   76-year-old man with complex gastrointestinal history ranging from oral cancer and associated neck dissection in 2012, history of colectomy and distal pancreatectomy for IPMN, chronic alcohol dependence, asplenia, coronary artery disease, peripheral vascular disease, type 2 diabetes with polyneuropathy, chronic atrial fibrillation on warfarin, history of pancreatitis among other chronic conditions who presented with epigastric pain.  CT of the abdomen and pelvis with significant findings including: \"Diffuse gastric wall thickening has increased since the previous exam and there is tubular new fluid density along the lesser curvature of the stomach. Differential would include deep ulcer or abscess, although the mucosa appears intact overlying this. This has developed since 9/13/2022. Given the fluid and inflammation surrounding the stomach and the fact that it abuts the resection margin of the pancreas, pancreatitis predominantly involving inflammation of the stomach cannot be excluded, but is considered unlikely. Endoscopic ultrasound may be useful for further evaluation...\"     As needed reversal of INR with vitamin K and now getting FFP for EGD procedure this afternoon. On IV protonix BID and remains NPO on IV fluids. Has needed multiple doses of IV opiate pain medication.     At the time of admission with the information available to the attending physician more than 2 " nights hospital complex care was anticipated, based on patient risk of adverse outcome if treated as outpatient and complex care required. Inpatient admission is appropriate based on the Medicare guidelines.     This document was produced using voice recognition software.    The information on this document is developed by the utilization review team in order for the business office to ensure compliance. This only denotes the appropriateness of proper admission status and does not reflect the quality of care rendered.   The definitions of Inpatient Status and Observation Status used in making the determination above are those provided in the CMS Coverage Manual, Chapter 1 and Chapter 6, section 70.4.     Sincerely,   Aparna Bowman MD  Utilization Review  Physician Advisor  [facility].

## 2022-10-27 LAB
ALBUMIN SERPL BCG-MCNC: 2.9 G/DL (ref 3.5–5.2)
ALP SERPL-CCNC: 77 U/L (ref 40–129)
ALT SERPL W P-5'-P-CCNC: 18 U/L (ref 10–50)
ANION GAP SERPL CALCULATED.3IONS-SCNC: 13 MMOL/L (ref 7–15)
AST SERPL W P-5'-P-CCNC: 25 U/L (ref 10–50)
BILIRUB DIRECT SERPL-MCNC: 0.56 MG/DL (ref 0–0.3)
BILIRUB SERPL-MCNC: 1.1 MG/DL
BLD PROD TYP BPU: NORMAL
BLD PROD TYP BPU: NORMAL
BLOOD COMPONENT TYPE: NORMAL
BLOOD COMPONENT TYPE: NORMAL
BUN SERPL-MCNC: 19.3 MG/DL (ref 8–23)
CALCIUM SERPL-MCNC: 9 MG/DL (ref 8.8–10.2)
CHLORIDE SERPL-SCNC: 102 MMOL/L (ref 98–107)
CODING SYSTEM: NORMAL
CODING SYSTEM: NORMAL
CREAT SERPL-MCNC: 0.83 MG/DL (ref 0.67–1.17)
DEPRECATED HCO3 PLAS-SCNC: 22 MMOL/L (ref 22–29)
ERYTHROCYTE [DISTWIDTH] IN BLOOD BY AUTOMATED COUNT: 16.7 % (ref 10–15)
GFR SERPL CREATININE-BSD FRML MDRD: >90 ML/MIN/1.73M2
GLUCOSE BLDC GLUCOMTR-MCNC: 100 MG/DL (ref 70–99)
GLUCOSE BLDC GLUCOMTR-MCNC: 105 MG/DL (ref 70–99)
GLUCOSE BLDC GLUCOMTR-MCNC: 105 MG/DL (ref 70–99)
GLUCOSE BLDC GLUCOMTR-MCNC: 114 MG/DL (ref 70–99)
GLUCOSE BLDC GLUCOMTR-MCNC: 84 MG/DL (ref 70–99)
GLUCOSE BLDC GLUCOMTR-MCNC: 84 MG/DL (ref 70–99)
GLUCOSE SERPL-MCNC: 128 MG/DL (ref 70–99)
HCT VFR BLD AUTO: 41.1 % (ref 40–53)
HGB BLD-MCNC: 12.8 G/DL (ref 13.3–17.7)
HGB BLD-MCNC: 13.2 G/DL (ref 13.3–17.7)
LIPASE SERPL-CCNC: 88 U/L (ref 13–60)
MCH RBC QN AUTO: 32.3 PG (ref 26.5–33)
MCHC RBC AUTO-ENTMCNC: 32.1 G/DL (ref 31.5–36.5)
MCV RBC AUTO: 101 FL (ref 78–100)
PLATELET # BLD AUTO: 156 10E3/UL (ref 150–450)
POTASSIUM SERPL-SCNC: 4.2 MMOL/L (ref 3.4–5.3)
PROT SERPL-MCNC: 6.1 G/DL (ref 6.4–8.3)
RBC # BLD AUTO: 4.09 10E6/UL (ref 4.4–5.9)
SODIUM SERPL-SCNC: 137 MMOL/L (ref 136–145)
UNIT ABO/RH: NORMAL
UNIT ABO/RH: NORMAL
UNIT NUMBER: NORMAL
UNIT NUMBER: NORMAL
UNIT STATUS: NORMAL
UNIT STATUS: NORMAL
UNIT TYPE ISBT: 8400
UNIT TYPE ISBT: 8400
WBC # BLD AUTO: 8.6 10E3/UL (ref 4–11)

## 2022-10-27 PROCEDURE — 82248 BILIRUBIN DIRECT: CPT | Performed by: INTERNAL MEDICINE

## 2022-10-27 PROCEDURE — 250N000011 HC RX IP 250 OP 636: Performed by: PHYSICIAN ASSISTANT

## 2022-10-27 PROCEDURE — 85018 HEMOGLOBIN: CPT | Performed by: INTERNAL MEDICINE

## 2022-10-27 PROCEDURE — 36415 COLL VENOUS BLD VENIPUNCTURE: CPT | Performed by: INTERNAL MEDICINE

## 2022-10-27 PROCEDURE — 85027 COMPLETE CBC AUTOMATED: CPT | Performed by: INTERNAL MEDICINE

## 2022-10-27 PROCEDURE — 258N000003 HC RX IP 258 OP 636: Performed by: PHYSICIAN ASSISTANT

## 2022-10-27 PROCEDURE — 83690 ASSAY OF LIPASE: CPT | Performed by: INTERNAL MEDICINE

## 2022-10-27 PROCEDURE — 250N000011 HC RX IP 250 OP 636: Performed by: INTERNAL MEDICINE

## 2022-10-27 PROCEDURE — 250N000013 HC RX MED GY IP 250 OP 250 PS 637: Performed by: PHYSICIAN ASSISTANT

## 2022-10-27 PROCEDURE — 120N000001 HC R&B MED SURG/OB

## 2022-10-27 PROCEDURE — C9113 INJ PANTOPRAZOLE SODIUM, VIA: HCPCS | Performed by: PHYSICIAN ASSISTANT

## 2022-10-27 PROCEDURE — 99232 SBSQ HOSP IP/OBS MODERATE 35: CPT | Performed by: INTERNAL MEDICINE

## 2022-10-27 RX ADMIN — SODIUM CHLORIDE, POTASSIUM CHLORIDE, SODIUM LACTATE AND CALCIUM CHLORIDE: 600; 310; 30; 20 INJECTION, SOLUTION INTRAVENOUS at 06:39

## 2022-10-27 RX ADMIN — OXYCODONE HYDROCHLORIDE 5 MG: 5 TABLET ORAL at 23:57

## 2022-10-27 RX ADMIN — METOPROLOL SUCCINATE 75 MG: 25 TABLET, EXTENDED RELEASE ORAL at 08:42

## 2022-10-27 RX ADMIN — TAZOBACTAM SODIUM AND PIPERACILLIN SODIUM 4.5 G: 500; 4 INJECTION, SOLUTION INTRAVENOUS at 11:30

## 2022-10-27 RX ADMIN — OXYCODONE HYDROCHLORIDE 5 MG: 5 TABLET ORAL at 05:51

## 2022-10-27 RX ADMIN — TAZOBACTAM SODIUM AND PIPERACILLIN SODIUM 4.5 G: 500; 4 INJECTION, SOLUTION INTRAVENOUS at 05:04

## 2022-10-27 RX ADMIN — TAZOBACTAM SODIUM AND PIPERACILLIN SODIUM 4.5 G: 500; 4 INJECTION, SOLUTION INTRAVENOUS at 18:12

## 2022-10-27 RX ADMIN — ONDANSETRON 4 MG: 4 TABLET, ORALLY DISINTEGRATING ORAL at 05:52

## 2022-10-27 RX ADMIN — PANTOPRAZOLE SODIUM 40 MG: 40 INJECTION, POWDER, FOR SOLUTION INTRAVENOUS at 00:59

## 2022-10-27 RX ADMIN — PANTOPRAZOLE SODIUM 40 MG: 40 INJECTION, POWDER, FOR SOLUTION INTRAVENOUS at 11:33

## 2022-10-27 RX ADMIN — METOPROLOL SUCCINATE 75 MG: 25 TABLET, EXTENDED RELEASE ORAL at 21:17

## 2022-10-27 RX ADMIN — TAZOBACTAM SODIUM AND PIPERACILLIN SODIUM 4.5 G: 500; 4 INJECTION, SOLUTION INTRAVENOUS at 01:05

## 2022-10-27 RX ADMIN — TAMSULOSIN HYDROCHLORIDE 0.4 MG: 0.4 CAPSULE ORAL at 08:42

## 2022-10-27 RX ADMIN — LISINOPRIL 10 MG: 10 TABLET ORAL at 08:42

## 2022-10-27 RX ADMIN — PREGABALIN 150 MG: 100 CAPSULE ORAL at 18:12

## 2022-10-27 RX ADMIN — ATORVASTATIN CALCIUM 80 MG: 80 TABLET, FILM COATED ORAL at 08:42

## 2022-10-27 RX ADMIN — PANTOPRAZOLE SODIUM 40 MG: 40 INJECTION, POWDER, FOR SOLUTION INTRAVENOUS at 23:57

## 2022-10-27 RX ADMIN — SODIUM CHLORIDE, POTASSIUM CHLORIDE, SODIUM LACTATE AND CALCIUM CHLORIDE: 600; 310; 30; 20 INJECTION, SOLUTION INTRAVENOUS at 17:31

## 2022-10-27 ASSESSMENT — ACTIVITIES OF DAILY LIVING (ADL)
ADLS_ACUITY_SCORE: 26

## 2022-10-27 NOTE — PROGRESS NOTES
Patient is alert and oriented X4. He is ind in his room. During evening he received plasma. Patient was stating that he was having black stools. RN paged tele med which stated that is expected from his EGD. Patient states he has numbness in lower extremities due to his neuropathy. He slept a little last night. PRN pain medications was administered around 0600 as well as zofran. He has LR running at 100 mL.hr. Plan for patient is to be transferred down to the [place] [facility].

## 2022-10-27 NOTE — PROGRESS NOTES
"[facility]    Hospitalist Progress Note    Date of Service (when I saw the patient): 10/27/2022    Assessment & Plan   Antoine Russo is a 76 year old male admitted on 10/25/2022. He presented to the emergency department for evaluation of epigastric pain and was found to have evidence of gastritis on imaging and concern for possible ulcer for which he is being admitted for further evaluation and treatment.     Acute gastritis without hemorrhage, concern for perforated peptic ulcer disease  Acute blood loss anemia  Presented with epigastric pain, CT abdomen & pelvis demonstrates \"Diffuse gastric wall thickening has increased since the previous exam and there is tubular new fluid density along the lesser curvature of the stomach. Differential would include deep ulcer or abscess, although the mucosa appears intact overlying this. This has developed since 9/13/2022. Given the fluid and inflammation surrounding the stomach and the fact that it abuts the resection margin of the pancreas, pancreatitis predominantly involving inflammation of the stomach cannot be excluded, but is considered unlikely. Endoscopic ultrasound may be useful for further evaluation...\" Admit hemoglobin normal (15.6), no evidence of bleeding.   Patient's personal risk factors for gastritis include near daily alcohol use, new clopidogrel use, and coumadin use.   Case discussed between emergency department and general surgery.  - INR reversal prior to EGD, given vitamin K -> 1.31  - Hemoglobin decreased from 15.6 on 10/25 to 13.2 on 10/27  - General Surgery consult apprecated  - EGD demonstrates diffusely erythematous gastric mucosa, with bleeding in gastric antrum with pus.    - Pt given FFP, tranexamic acid, and vitamin K  - Surgeon concerned for perforation and recommending transfer for advanced endoscopy  - Discussed with GI at [facility], agree with need for advanced endoscopy and possible EUS  - Start Zosyn on 10/26  - " "IV Protonix 40 mg q12h  - Continue NPO due to concern for perforation  - Antiemetics and analgesia available     Possible recurrent pancreatitis  Pancreatic pseudocyst  Patient with known history of a distal pancreatectomy for IPMN of the tail in the setting of chronic pancreatitis.  He has history of necrotizing pancreatitis, pancreatic duct leak, and a history of a stent in 2018, since removed. Patient does admit to near daily drinking. CT abdomen & pelvis demonstrates \"... Given the fluid and inflammation surrounding the stomach and the fact that it abuts the resection margin of the pancreas, pancreatitis predominantly involving inflammation of the stomach cannot be excluded, but is considered unlikely. Endoscopic ultrasound may be useful for further evaluation... 3.  Splenectomy and distal pancreatectomy. No evidence for inflammation of the residual pancreas.\" Admit lipase 200.   - LR @ 100 ml/hr  - NPO  - Antiemetics and analgesia noted above     Renal cysts  Incidental finding on CT abdomen & pelvis \"2.  Multiple cysts are again seen throughout both kidneys. One hemorrhagic cyst versus solid mass arising from the lateral right kidney is unchanged. MRI could further evaluate if indicated.\"   - Consider renal MRI as outpatient      Chronic atrial fibrillation   Long term current use of anticoagulant therapy / Drug Induced Coagulation Defect  Rate controlled prior to admission with metoprolol XL 75 mg bid. Anticoagulated prior to admission with coumadin, admit INR 1.81.   - Continue metoprolol   - INR reversal prior to EGD     Alcohol abuse  Patient admits to near daily alcohol use, \"a few beers a day.\" Last drink on 10/22. No evidence of withdrawal at time of admission.  - No CIWA indicated, but monitor for evidence of withdrawal     Type 2 diabetes mellitus with diabetic polyneuropathy, without long-term current use of insulin (H)  HgbA1c 8.2. Managed prior to admission with Jardiance 10 mg daily and Novolin " NPH 40 U q am / 32 U q pm. Patient has not been using his insulin recently due to poor oral intake.   - Hold Novolin NPH while NPO  - Hold Jardiance  - High sliding scale insulin (NPO regimen)  - Hyper/hypoglycemia protocols     CAD (coronary artery disease)  Peripheral vascular disease / SFA disease  Hyperlipidemia LDL goal <100  Drug Induced Platelet Defect  Known PVD, follows with vascular Dr. Stacy. Recent left lower extremity angiogram on 9/30/22. Known CAD on prior stress tests, but has never had PCI or CABG interventions. Managed prior to admission with atorvastatin 80 mg daily and clopidogrel 75 mg daily.   - Continue statin  - Clopidogrel on hold      Leukocytosis with left shift  Spleen absent  Admit WBC 16.0, ANC 13.9. 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 Unclear if he is up to date on immunizations. No indication for antibiotics at present.  - Monitor  - CBC in am      Hypertension, benign essential, goal below 140/90  Blood pressure intermittently elevated. Managed prior to admission with lisinopril 10 mg daily and metoprolol XL 75 mg bid.   - Continue lisinopril and metoprolol with holding parameters     Hypertrophy of prostate without urinary obstruction  Managed prior to admission with Flomax 0.4 mg daily, continue.      GERD (gastroesophageal reflux disease)  Noted on problem list, not on PPI or H2 blocker prior to admission.  - Start PPI as above     Peripheral polyneuropathy  Managed prior to admission with Lyrica 150 mg q pm, continue.      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 Not acute. Continue with outpatient management.    Diet: NPO per Anesthesia Guidelines for Procedure/Surgery Except for: Meds  DVT Prophylaxis: Warfarin prior to admission, currently undergoing INR reversal  Chavez Catheter: Not present  Central  Lines: None  Cardiac Monitoring: None  Code Status: Full Code    Disposition: Patient is currently awaiting transfer for advanced endoscopy.    Yassine Wright MD    Interval History   The patient is resting in bed.  He continues to complain of moderate abdominal pain.  He denies fevers or chills.    -Data reviewed today: I reviewed all new labs and imaging results over the last 24 hours. I personally reviewed no images or EKG's today.    Physical Exam   Temp: 97.6  F (36.4  C) Temp src: Oral BP: 103/61 Pulse: 67   Resp: 16 SpO2: 97 % O2 Device: Nasal cannula Oxygen Delivery: 2 LPM  Vitals:    10/25/22 0855 10/25/22 2123 10/27/22 0624   Weight: 90.7 kg (200 lb) 91.8 kg (202 lb 6.1 oz) 92.3 kg (203 lb 7.8 oz)     Vital Signs with Ranges  Temp:  [97.6  F (36.4  C)-98.3  F (36.8  C)] 97.6  F (36.4  C)  Pulse:  [61-89] 67  Resp:  [16-20] 16  BP: (103-155)/(59-81) 103/61  MAP:  [94 mmHg-124 mmHg] 94 mmHg  SpO2:  [94 %-97 %] 97 %  I/O last 3 completed shifts:  In: 3081 [I.V.:2781]  Out: -     Gen: Well nourished, well developed, alert and oriented x 3, no acute distressed  HEENT: Atraumatic, normocephalic; sclera non-injected, anicterric; oral mucosa moist, no lesion, no exudate  Lungs: Clear to ausculation, no wheezes, no rhonchi, no rales  Heart: Regular rate, regular rhythm, no gallops, no rubs, no murmurs  GI: Bowel sound decreased, no hepatosplenomegaly, no masses, minimally tender, non-distended, no guarding, no rebound tenderness  Lymph: No lymphadenopathy, no edema  Skin: No rashes, no chronic venous stasis     Medications     lactated ringers 100 mL/hr at 10/27/22 0639       atorvastatin  80 mg Oral Daily     [Held by provider] clopidogrel  75 mg Oral Daily     [Held by provider] empagliflozin  10 mg Oral Daily     insulin aspart  1-12 Units Subcutaneous Q4H     lisinopril  10 mg Oral Daily     metoprolol succinate ER  75 mg Oral BID     pantoprazole  40 mg Intravenous Q12H     piperacillin-tazobactam  4.5 g  Intravenous Q6H     pregabalin  150 mg Oral QPM     sodium chloride (PF)  3 mL Intracatheter Q8H     tamsulosin  0.4 mg Oral Daily       Data   Recent Labs   Lab 10/27/22  1148 10/27/22  0806 10/27/22  0530 10/27/22  0108 10/26/22  2206 10/26/22  1730 10/26/22  1355 10/26/22  0832 10/26/22  0524 10/25/22  2319 10/25/22  0920   WBC  --   --  8.6  --   --   --   --   --  14.5*  --  16.0*   HGB  --   --  13.2*  13.2*  --  13.9  --  14.9  --  15.3  --  15.6   MCV  --   --  101*  --   --   --   --   --  104*  --  102*   PLT  --   --  156  --   --   --   --   --  147*  --  169   INR  --   --   --   --  1.31*  --  1.35*  --  1.55*  --  1.81*   NA  --   --  137  --   --   --   --   --  137  --  135*   POTASSIUM  --   --  4.2  --   --   --   --   --  4.4  --  4.7   CHLORIDE  --   --  102  --   --   --   --   --  100  --  96*   CO2  --   --  22  --   --   --   --   --  23  --  24   BUN  --   --  19.3  --   --   --   --   --  17.1  --  20.1   CR  --   --  0.83  --   --   --   --   --  0.87  --  0.94   ANIONGAP  --   --  13  --   --   --   --   --  14  --  15   NILSON  --   --  9.0  --   --   --   --   --  9.3  --  9.7   * 100* 128*   < >  --    < >  --    < > 110*   < > 165*   ALBUMIN  --   --  2.9*  --   --   --   --   --  2.9*  --  3.4*   PROTTOTAL  --   --  6.1*  --   --   --   --   --  6.5  --  7.3   BILITOTAL  --   --  1.1  --   --   --   --   --  1.8*  --  1.4*   ALKPHOS  --   --  77  --   --   --   --   --  69  --  75   ALT  --   --  18  --   --   --   --   --  14  --  19   AST  --   --  25  --   --   --   --   --  24  --  24   LIPASE  --   --  88*  --   --   --   --   --   --   --  200*    < > = values in this interval not displayed.       No results found for this or any previous visit (from the past 24 hour(s)).

## 2022-10-27 NOTE — PLAN OF CARE
Time: Assumed care of patient from 8065-7789  Reason for Admission: Acute gastritis   Activity: Up ad guerrero  Neuro: Alert and orientated  GI/: Voiding spontaneously, BS active   Skin: Intact  Diet: NPO, sips with meds  IV Access: PIV infusing LR at 100 mL/h  Vitals: VSS on 2L 02  Pain: Denies  Plan: [facility]    Lisandra Chacko RN

## 2022-10-28 LAB
ANION GAP SERPL CALCULATED.3IONS-SCNC: 10 MMOL/L (ref 7–15)
BUN SERPL-MCNC: 15.5 MG/DL (ref 8–23)
CALCIUM SERPL-MCNC: 8.8 MG/DL (ref 8.8–10.2)
CHLORIDE SERPL-SCNC: 103 MMOL/L (ref 98–107)
CREAT SERPL-MCNC: 0.83 MG/DL (ref 0.67–1.17)
DEPRECATED HCO3 PLAS-SCNC: 23 MMOL/L (ref 22–29)
ERYTHROCYTE [DISTWIDTH] IN BLOOD BY AUTOMATED COUNT: 16.6 % (ref 10–15)
GFR SERPL CREATININE-BSD FRML MDRD: >90 ML/MIN/1.73M2
GLUCOSE BLDC GLUCOMTR-MCNC: 100 MG/DL (ref 70–99)
GLUCOSE BLDC GLUCOMTR-MCNC: 77 MG/DL (ref 70–99)
GLUCOSE BLDC GLUCOMTR-MCNC: 81 MG/DL (ref 70–99)
GLUCOSE BLDC GLUCOMTR-MCNC: 82 MG/DL (ref 70–99)
GLUCOSE BLDC GLUCOMTR-MCNC: 83 MG/DL (ref 70–99)
GLUCOSE BLDC GLUCOMTR-MCNC: 85 MG/DL (ref 70–99)
GLUCOSE SERPL-MCNC: 90 MG/DL (ref 70–99)
HCT VFR BLD AUTO: 40.8 % (ref 40–53)
HGB BLD-MCNC: 12.8 G/DL (ref 13.3–17.7)
HGB BLD-MCNC: 12.8 G/DL (ref 13.3–17.7)
HGB BLD-MCNC: 13.3 G/DL (ref 13.3–17.7)
HGB BLD-MCNC: 13.3 G/DL (ref 13.3–17.7)
INR PPP: 1.26 (ref 0.85–1.15)
MCH RBC QN AUTO: 32.2 PG (ref 26.5–33)
MCHC RBC AUTO-ENTMCNC: 31.4 G/DL (ref 31.5–36.5)
MCV RBC AUTO: 103 FL (ref 78–100)
PATH REPORT.COMMENTS IMP SPEC: NORMAL
PATH REPORT.FINAL DX SPEC: NORMAL
PATH REPORT.GROSS SPEC: NORMAL
PATH REPORT.MICROSCOPIC SPEC OTHER STN: NORMAL
PATH REPORT.RELEVANT HX SPEC: NORMAL
PHOTO IMAGE: NORMAL
PLATELET # BLD AUTO: 145 10E3/UL (ref 150–450)
POTASSIUM SERPL-SCNC: 4.1 MMOL/L (ref 3.4–5.3)
RBC # BLD AUTO: 3.98 10E6/UL (ref 4.4–5.9)
SODIUM SERPL-SCNC: 136 MMOL/L (ref 136–145)
WBC # BLD AUTO: 7 10E3/UL (ref 4–11)

## 2022-10-28 PROCEDURE — 85610 PROTHROMBIN TIME: CPT | Performed by: INTERNAL MEDICINE

## 2022-10-28 PROCEDURE — 85018 HEMOGLOBIN: CPT | Performed by: INTERNAL MEDICINE

## 2022-10-28 PROCEDURE — C9113 INJ PANTOPRAZOLE SODIUM, VIA: HCPCS | Performed by: PHYSICIAN ASSISTANT

## 2022-10-28 PROCEDURE — 120N000001 HC R&B MED SURG/OB

## 2022-10-28 PROCEDURE — 99232 SBSQ HOSP IP/OBS MODERATE 35: CPT | Performed by: INTERNAL MEDICINE

## 2022-10-28 PROCEDURE — 88305 TISSUE EXAM BY PATHOLOGIST: CPT | Mod: 26 | Performed by: PATHOLOGY

## 2022-10-28 PROCEDURE — 85027 COMPLETE CBC AUTOMATED: CPT | Performed by: INTERNAL MEDICINE

## 2022-10-28 PROCEDURE — 258N000003 HC RX IP 258 OP 636: Performed by: PHYSICIAN ASSISTANT

## 2022-10-28 PROCEDURE — 250N000011 HC RX IP 250 OP 636: Performed by: PHYSICIAN ASSISTANT

## 2022-10-28 PROCEDURE — 80048 BASIC METABOLIC PNL TOTAL CA: CPT | Performed by: INTERNAL MEDICINE

## 2022-10-28 PROCEDURE — 250N000011 HC RX IP 250 OP 636: Performed by: INTERNAL MEDICINE

## 2022-10-28 PROCEDURE — 36415 COLL VENOUS BLD VENIPUNCTURE: CPT | Performed by: INTERNAL MEDICINE

## 2022-10-28 PROCEDURE — 250N000013 HC RX MED GY IP 250 OP 250 PS 637: Performed by: PHYSICIAN ASSISTANT

## 2022-10-28 RX ADMIN — TAZOBACTAM SODIUM AND PIPERACILLIN SODIUM 4.5 G: 500; 4 INJECTION, SOLUTION INTRAVENOUS at 19:41

## 2022-10-28 RX ADMIN — LISINOPRIL 10 MG: 10 TABLET ORAL at 08:44

## 2022-10-28 RX ADMIN — PANTOPRAZOLE SODIUM 40 MG: 40 INJECTION, POWDER, FOR SOLUTION INTRAVENOUS at 12:38

## 2022-10-28 RX ADMIN — ATORVASTATIN CALCIUM 80 MG: 80 TABLET, FILM COATED ORAL at 08:42

## 2022-10-28 RX ADMIN — PREGABALIN 150 MG: 100 CAPSULE ORAL at 17:42

## 2022-10-28 RX ADMIN — SODIUM CHLORIDE, POTASSIUM CHLORIDE, SODIUM LACTATE AND CALCIUM CHLORIDE: 600; 310; 30; 20 INJECTION, SOLUTION INTRAVENOUS at 22:48

## 2022-10-28 RX ADMIN — METOPROLOL SUCCINATE 75 MG: 25 TABLET, EXTENDED RELEASE ORAL at 08:42

## 2022-10-28 RX ADMIN — TAZOBACTAM SODIUM AND PIPERACILLIN SODIUM 4.5 G: 500; 4 INJECTION, SOLUTION INTRAVENOUS at 08:35

## 2022-10-28 RX ADMIN — TAZOBACTAM SODIUM AND PIPERACILLIN SODIUM 4.5 G: 500; 4 INJECTION, SOLUTION INTRAVENOUS at 14:30

## 2022-10-28 RX ADMIN — SODIUM CHLORIDE, POTASSIUM CHLORIDE, SODIUM LACTATE AND CALCIUM CHLORIDE: 600; 310; 30; 20 INJECTION, SOLUTION INTRAVENOUS at 10:19

## 2022-10-28 RX ADMIN — TAMSULOSIN HYDROCHLORIDE 0.4 MG: 0.4 CAPSULE ORAL at 08:43

## 2022-10-28 RX ADMIN — METOPROLOL SUCCINATE 75 MG: 25 TABLET, EXTENDED RELEASE ORAL at 19:41

## 2022-10-28 RX ADMIN — PANTOPRAZOLE SODIUM 40 MG: 40 INJECTION, POWDER, FOR SOLUTION INTRAVENOUS at 22:48

## 2022-10-28 RX ADMIN — TAZOBACTAM SODIUM AND PIPERACILLIN SODIUM 4.5 G: 500; 4 INJECTION, SOLUTION INTRAVENOUS at 00:07

## 2022-10-28 ASSESSMENT — ACTIVITIES OF DAILY LIVING (ADL)
ADLS_ACUITY_SCORE: 26

## 2022-10-28 NOTE — PROGRESS NOTES
"[facility]    Hospitalist Progress Note    Date of Service (when I saw the patient): 10/28/2022    Assessment & Plan   Antoine Russo is a 76 year old male admitted on 10/25/2022. He presented to the emergency department for evaluation of epigastric pain and was found to have evidence of gastritis on imaging and concern for possible ulcer for which he is being admitted for further evaluation and treatment.     Acute gastritis without hemorrhage, concern for perforated peptic ulcer disease  Acute blood loss anemia  Presented with epigastric pain, CT abdomen & pelvis demonstrates \"Diffuse gastric wall thickening has increased since the previous exam and there is tubular new fluid density along the lesser curvature of the stomach. Differential would include deep ulcer or abscess, although the mucosa appears intact overlying this. This has developed since 9/13/2022. Given the fluid and inflammation surrounding the stomach and the fact that it abuts the resection margin of the pancreas, pancreatitis predominantly involving inflammation of the stomach cannot be excluded, but is considered unlikely. Endoscopic ultrasound may be useful for further evaluation...\" Admit hemoglobin normal (15.6), no evidence of bleeding.   Patient's personal risk factors for gastritis include near daily alcohol use, new clopidogrel use, and coumadin use.   Case discussed between emergency department and general surgery.  - INR of 1.81 in ED reversed prior to EGD, given vitamin K -> 1.31  - Hemoglobin decreased from 15.6 on 10/25 to 13.2 on 10/27  - General Surgery consult apprecated  - EGD on 10/26 demonstrated diffusely erythematous gastric mucosa, with bleeding in gastric antrum with pus.    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  He has history of necrotizing pancreatitis, pancreatic duct leak, and a history of a stent in 2018, since removed. Patient does admit to near daily drinking. CT abdomen & pelvis demonstrates \"... Given the fluid and inflammation surrounding the stomach and the fact that it abuts the resection margin of the pancreas, pancreatitis predominantly involving inflammation of the stomach cannot be excluded, but is considered unlikely. Endoscopic ultrasound may be useful for further evaluation... 3.  Splenectomy and distal pancreatectomy. No evidence for inflammation of the residual pancreas.\" Admit lipase 200.   - LR @ 100 ml/hr  - NPO  - Antiemetics and analgesia noted above     Renal cysts  Incidental finding on CT abdomen & pelvis \"2.  Multiple cysts are again seen throughout both kidneys. One hemorrhagic cyst versus solid mass arising from the lateral right kidney is unchanged. MRI could further evaluate if indicated.\"   - Consider renal MRI as outpatient      Chronic atrial fibrillation   Long term current use of anticoagulant therapy / Drug Induced Coagulation Defect  Rate controlled prior to admission with metoprolol XL 75 mg bid. Anticoagulated prior to admission with coumadin, admit INR 1.81.   - Continue metoprolol   - INR reversed prior to EGD  - Continue to hold coumadin     Alcohol abuse  Patient admits to near daily alcohol use, \"a few beers a day.\" Last drink on 10/22. No evidence of withdrawal at time of admission.  - No CIWA indicated, but monitor for evidence of withdrawal     Type 2 diabetes mellitus with diabetic polyneuropathy, without long-term current use of insulin (H)  HgbA1c 8.2. " Managed prior to admission with Jardiance 10 mg daily and Novolin NPH 40 U q am / 32 U q pm. Patient has not been using his insulin recently due to poor oral intake.   - Hold Novolin NPH while NPO  - Hold Jardiance  - High sliding scale insulin (NPO regimen)  - Hyper/hypoglycemia protocols     CAD (coronary artery disease)  Peripheral vascular disease / SFA disease  Hyperlipidemia LDL goal <100  Drug Induced Platelet Defect  Known PVD, follows with vascular Dr. Stacy. Recent left lower extremity angiogram on 9/30/22. Known CAD on prior stress tests, but has never had PCI or CABG interventions. Managed prior to admission with atorvastatin 80 mg daily and clopidogrel 75 mg daily.   - Continue atorvastatin  - Clopidogrel on hold      Leukocytosis with left shift  Spleen absent  Admit WBC 16.0, ANC 13.9. 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 Unclear if he is up to date on immunizations. No indication for antibiotics at present.  - Monitor  - CBC in am      Hypertension, benign essential, goal below 140/90  Blood pressure intermittently elevated. Managed prior to admission with lisinopril 10 mg daily and metoprolol XL 75 mg bid.   - Continue lisinopril and metoprolol with holding parameters     Hypertrophy of prostate without urinary obstruction  Managed prior to admission with Flomax 0.4 mg daily, continue.      GERD (gastroesophageal reflux disease)  Noted on problem list, not on PPI or H2 blocker prior to admission.  - Start PPI as above     Peripheral polyneuropathy  Managed prior to admission with Lyrica 150 mg q pm, continue.      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 Not acute. Continue with outpatient management.    Diet: NPO per Anesthesia Guidelines for Procedure/Surgery Except for: Meds  DVT Prophylaxis: Warfarin prior to admission,  "currently undergoing INR reversal  Chavez Catheter: Not present  Central Lines: None  Cardiac Monitoring: None  Code Status: Full Code    Disposition: Patient is currently awaiting transfer to [facility] for advanced endoscopy.    Yassine Wright MD    Interval History   The patient is resting in bed.  He states he occasionally has twinges of pain, but denies fevers or chills.  He is hungry    -Data reviewed today: I reviewed all new labs and imaging results over the last 24 hours. I personally reviewed no images or EKG's today.    Physical Exam   Temp: 98.1  F (36.7  C) Temp src: Oral BP: (!) 141/68 Pulse: 74   Resp: 16 SpO2: 93 % O2 Device: None (Room air) Oxygen Delivery: 1 LPM (Pt reports \"I was on oxygen all night.\")  Vitals:    10/25/22 0855 10/25/22 2123 10/27/22 0624   Weight: 90.7 kg (200 lb) 91.8 kg (202 lb 6.1 oz) 92.3 kg (203 lb 7.8 oz)     Vital Signs with Ranges  Temp:  [97.6  F (36.4  C)-98.1  F (36.7  C)] 98.1  F (36.7  C)  Pulse:  [57-76] 74  Resp:  [16-18] 16  BP: (120-141)/(53-68) 141/68  SpO2:  [92 %-98 %] 93 %  I/O last 3 completed shifts:  In: 1220 [I.V.:1220]  Out: -     Gen: Well nourished, well developed, alert and oriented x 3, no acute distressed  HEENT: Atraumatic, normocephalic; sclera non-injected, anicterric; oral mucosa moist, no lesion, no exudate  Lungs: Clear to ausculation, no wheezes, no rhonchi, no rales  Heart: Regular rate, regular rhythm, no gallops, no rubs, no murmurs  GI: Bowel sound decreased, no hepatosplenomegaly, no masses, minimally tender, non-distended, no guarding, no rebound tenderness  Lymph: No lymphadenopathy, no edema  Skin: No rashes, no chronic venous stasis     Medications     lactated ringers 100 mL/hr at 10/28/22 1430       atorvastatin  80 mg Oral Daily     [Held by provider] clopidogrel  75 mg Oral Daily     [Held by provider] empagliflozin  10 mg Oral Daily     insulin aspart  1-12 Units Subcutaneous Q4H     lisinopril  10 mg Oral Daily     metoprolol " succinate ER  75 mg Oral BID     pantoprazole  40 mg Intravenous Q12H     piperacillin-tazobactam  4.5 g Intravenous Q6H     pregabalin  150 mg Oral QPM     sodium chloride (PF)  3 mL Intracatheter Q8H     tamsulosin  0.4 mg Oral Daily       Data   Recent Labs   Lab 10/28/22  1425 10/28/22  1423 10/28/22  1020 10/28/22  0614 10/28/22  0602 10/28/22  0214 10/27/22  2154 10/27/22  0806 10/27/22  0530 10/27/22  0108 10/26/22  2206 10/26/22  1730 10/26/22  1355 10/26/22  0832 10/26/22  0524 10/25/22  2319 10/25/22  0920   WBC  --   --   --   --  7.0  --   --   --  8.6  --   --   --   --   --  14.5*  --  16.0*   HGB 13.3  --   --   --  12.8*  12.8*  --  12.8*   < > 13.2*  13.2*  --  13.9  --  14.9  --  15.3  --  15.6   MCV  --   --   --   --  103*  --   --   --  101*  --   --   --   --   --  104*  --  102*   PLT  --   --   --   --  145*  --   --   --  156  --   --   --   --   --  147*  --  169   INR  --   --   --   --  1.26*  --   --   --   --   --  1.31*  --  1.35*  --  1.55*  --  1.81*   NA  --   --   --   --  136  --   --   --  137  --   --   --   --   --  137  --  135*   POTASSIUM  --   --   --   --  4.1  --   --   --  4.2  --   --   --   --   --  4.4  --  4.7   CHLORIDE  --   --   --   --  103  --   --   --  102  --   --   --   --   --  100  --  96*   CO2  --   --   --   --  23  --   --   --  22  --   --   --   --   --  23  --  24   BUN  --   --   --   --  15.5  --   --   --  19.3  --   --   --   --   --  17.1  --  20.1   CR  --   --   --   --  0.83  --   --   --  0.83  --   --   --   --   --  0.87  --  0.94   ANIONGAP  --   --   --   --  10  --   --   --  13  --   --   --   --   --  14  --  15   NILSON  --   --   --   --  8.8  --   --   --  9.0  --   --   --   --   --  9.3  --  9.7   GLC  --  100* 77 85 90   < >  --    < > 128*   < >  --    < >  --    < > 110*   < > 165*   ALBUMIN  --   --   --   --   --   --   --   --  2.9*  --   --   --   --   --  2.9*  --  3.4*   PROTTOTAL  --   --   --   --   --   --   --   --   6.1*  --   --   --   --   --  6.5  --  7.3   BILITOTAL  --   --   --   --   --   --   --   --  1.1  --   --   --   --   --  1.8*  --  1.4*   ALKPHOS  --   --   --   --   --   --   --   --  77  --   --   --   --   --  69  --  75   ALT  --   --   --   --   --   --   --   --  18  --   --   --   --   --  14  --  19   AST  --   --   --   --   --   --   --   --  25  --   --   --   --   --  24  --  24   LIPASE  --   --   --   --   --   --   --   --  88*  --   --   --   --   --   --   --  200*    < > = values in this interval not displayed.       No results found for this or any previous visit (from the past 24 hour(s)).

## 2022-10-28 NOTE — PROGRESS NOTES
Patient alert and oriented x 4. Vitals stable; on room air. Abdominal pain 2/10, partially relieved with rest and repositioning. Patient declined medication for pain this shift. Denies nausea. Independent in room. NPO status maintained. Patient is voiding spontaneously; no BM this shift. PIV infusing.    Temp: 97.9  F (36.6  C) Temp src: Oral BP: 129/65 Pulse: 63   Resp: 16 SpO2: 97 % O2 Device: None (Room air)

## 2022-10-28 NOTE — PROVIDER NOTIFICATION
"Patient is alert and oriented x 4.  Rates pain \"4\" on 0-10 pain scale; has declined pain medication today.  No nausea or shortenss of air.  Up to shower today and ambulated in hallway x 2.  Patient NPO except sips of water with medications.       10/28/22 1538   Vital Signs   Temp 98.1  F (36.7  C)   Temp src Oral   Resp 16   Pulse 74   Pulse Rate Source Monitor   BP (!) 141/68   BP Location Left arm   Oxygen Therapy   SpO2 94 %   O2 Device None (Room air)     "

## 2022-10-29 ENCOUNTER — APPOINTMENT (OUTPATIENT)
Dept: GENERAL RADIOLOGY | Facility: CLINIC | Age: 77
DRG: 377 | End: 2022-10-29
Attending: INTERNAL MEDICINE
Payer: COMMERCIAL

## 2022-10-29 LAB
ALBUMIN SERPL BCG-MCNC: 2.9 G/DL (ref 3.5–5.2)
ALP SERPL-CCNC: 74 U/L (ref 40–129)
ALT SERPL W P-5'-P-CCNC: 17 U/L (ref 10–50)
ANION GAP SERPL CALCULATED.3IONS-SCNC: 15 MMOL/L (ref 7–15)
AST SERPL W P-5'-P-CCNC: 26 U/L (ref 10–50)
BILIRUB DIRECT SERPL-MCNC: 0.38 MG/DL (ref 0–0.3)
BILIRUB SERPL-MCNC: 0.8 MG/DL
BUN SERPL-MCNC: 9.4 MG/DL (ref 8–23)
CALCIUM SERPL-MCNC: 8.9 MG/DL (ref 8.8–10.2)
CHLORIDE SERPL-SCNC: 99 MMOL/L (ref 98–107)
CREAT SERPL-MCNC: 0.81 MG/DL (ref 0.67–1.17)
DEPRECATED HCO3 PLAS-SCNC: 24 MMOL/L (ref 22–29)
GFR SERPL CREATININE-BSD FRML MDRD: >90 ML/MIN/1.73M2
GLUCOSE BLDC GLUCOMTR-MCNC: 102 MG/DL (ref 70–99)
GLUCOSE BLDC GLUCOMTR-MCNC: 73 MG/DL (ref 70–99)
GLUCOSE BLDC GLUCOMTR-MCNC: 78 MG/DL (ref 70–99)
GLUCOSE BLDC GLUCOMTR-MCNC: 78 MG/DL (ref 70–99)
GLUCOSE BLDC GLUCOMTR-MCNC: 83 MG/DL (ref 70–99)
GLUCOSE BLDC GLUCOMTR-MCNC: 87 MG/DL (ref 70–99)
GLUCOSE SERPL-MCNC: 83 MG/DL (ref 70–99)
HGB BLD-MCNC: 13 G/DL (ref 13.3–17.7)
HGB BLD-MCNC: 13 G/DL (ref 13.3–17.7)
HGB BLD-MCNC: 13.8 G/DL (ref 13.3–17.7)
MAGNESIUM SERPL-MCNC: 1.5 MG/DL (ref 1.7–2.3)
PHOSPHATE SERPL-MCNC: 1.8 MG/DL (ref 2.5–4.5)
POTASSIUM SERPL-SCNC: 3.8 MMOL/L (ref 3.4–5.3)
PROT SERPL-MCNC: 6.2 G/DL (ref 6.4–8.3)
SODIUM SERPL-SCNC: 138 MMOL/L (ref 136–145)

## 2022-10-29 PROCEDURE — 83735 ASSAY OF MAGNESIUM: CPT | Performed by: INTERNAL MEDICINE

## 2022-10-29 PROCEDURE — 82248 BILIRUBIN DIRECT: CPT | Performed by: INTERNAL MEDICINE

## 2022-10-29 PROCEDURE — 250N000011 HC RX IP 250 OP 636: Performed by: PHYSICIAN ASSISTANT

## 2022-10-29 PROCEDURE — 36568 INSJ PICC <5 YR W/O IMAGING: CPT | Mod: 52

## 2022-10-29 PROCEDURE — 272N000024 HC KIT POWER PICC DOUBLE LUMEN

## 2022-10-29 PROCEDURE — 250N000011 HC RX IP 250 OP 636: Performed by: INTERNAL MEDICINE

## 2022-10-29 PROCEDURE — 99232 SBSQ HOSP IP/OBS MODERATE 35: CPT | Performed by: INTERNAL MEDICINE

## 2022-10-29 PROCEDURE — G0463 HOSPITAL OUTPT CLINIC VISIT: HCPCS | Mod: 25

## 2022-10-29 PROCEDURE — 999N000065 XR CHEST PORT 1 VIEW

## 2022-10-29 PROCEDURE — 120N000001 HC R&B MED SURG/OB

## 2022-10-29 PROCEDURE — 250N000009 HC RX 250: Performed by: INTERNAL MEDICINE

## 2022-10-29 PROCEDURE — 84100 ASSAY OF PHOSPHORUS: CPT | Performed by: INTERNAL MEDICINE

## 2022-10-29 PROCEDURE — 85018 HEMOGLOBIN: CPT | Performed by: INTERNAL MEDICINE

## 2022-10-29 PROCEDURE — 250N000013 HC RX MED GY IP 250 OP 250 PS 637: Performed by: PHYSICIAN ASSISTANT

## 2022-10-29 PROCEDURE — C9113 INJ PANTOPRAZOLE SODIUM, VIA: HCPCS | Performed by: PHYSICIAN ASSISTANT

## 2022-10-29 PROCEDURE — 36415 COLL VENOUS BLD VENIPUNCTURE: CPT | Performed by: INTERNAL MEDICINE

## 2022-10-29 PROCEDURE — 80053 COMPREHEN METABOLIC PANEL: CPT | Performed by: INTERNAL MEDICINE

## 2022-10-29 PROCEDURE — 258N000003 HC RX IP 258 OP 636: Performed by: PHYSICIAN ASSISTANT

## 2022-10-29 PROCEDURE — 02HV33Z INSERTION OF INFUSION DEVICE INTO SUPERIOR VENA CAVA, PERCUTANEOUS APPROACH: ICD-10-PCS | Performed by: INTERNAL MEDICINE

## 2022-10-29 RX ORDER — DEXTROSE MONOHYDRATE 100 MG/ML
INJECTION, SOLUTION INTRAVENOUS CONTINUOUS PRN
Status: DISCONTINUED | OUTPATIENT
Start: 2022-10-29 | End: 2022-11-03 | Stop reason: HOSPADM

## 2022-10-29 RX ORDER — DEXTROSE MONOHYDRATE 100 MG/ML
INJECTION, SOLUTION INTRAVENOUS CONTINUOUS PRN
Status: DISCONTINUED | OUTPATIENT
Start: 2022-10-29 | End: 2022-10-30

## 2022-10-29 RX ORDER — LIDOCAINE 40 MG/G
CREAM TOPICAL
Status: ACTIVE | OUTPATIENT
Start: 2022-10-29 | End: 2022-11-01

## 2022-10-29 RX ADMIN — HYDROMORPHONE HYDROCHLORIDE 0.2 MG: 0.2 INJECTION, SOLUTION INTRAMUSCULAR; INTRAVENOUS; SUBCUTANEOUS at 18:13

## 2022-10-29 RX ADMIN — ASCORBIC ACID, VITAMIN A PALMITATE, CHOLECALCIFEROL, THIAMINE HYDROCHLORIDE, RIBOFLAVIN-5 PHOSPHATE SODIUM, PYRIDOXINE HYDROCHLORIDE, NIACINAMIDE, DEXPANTHENOL, ALPHA-TOCOPHEROL ACETATE, VITAMIN K1, FOLIC ACID, BIOTIN, CYANOCOBALAMIN: 200; 3300; 200; 6; 3.6; 6; 40; 15; 10; 150; 600; 60; 5 INJECTION, SOLUTION INTRAVENOUS at 21:30

## 2022-10-29 RX ADMIN — HYDROMORPHONE HYDROCHLORIDE 0.2 MG: 0.2 INJECTION, SOLUTION INTRAMUSCULAR; INTRAVENOUS; SUBCUTANEOUS at 23:38

## 2022-10-29 RX ADMIN — LISINOPRIL 10 MG: 10 TABLET ORAL at 08:42

## 2022-10-29 RX ADMIN — TAZOBACTAM SODIUM AND PIPERACILLIN SODIUM 4.5 G: 500; 4 INJECTION, SOLUTION INTRAVENOUS at 08:42

## 2022-10-29 RX ADMIN — TAZOBACTAM SODIUM AND PIPERACILLIN SODIUM 4.5 G: 500; 4 INJECTION, SOLUTION INTRAVENOUS at 15:38

## 2022-10-29 RX ADMIN — SODIUM CHLORIDE, POTASSIUM CHLORIDE, SODIUM LACTATE AND CALCIUM CHLORIDE: 600; 310; 30; 20 INJECTION, SOLUTION INTRAVENOUS at 10:36

## 2022-10-29 RX ADMIN — I.V. FAT EMULSION 250 ML: 20 EMULSION INTRAVENOUS at 21:34

## 2022-10-29 RX ADMIN — TAZOBACTAM SODIUM AND PIPERACILLIN SODIUM 4.5 G: 500; 4 INJECTION, SOLUTION INTRAVENOUS at 02:35

## 2022-10-29 RX ADMIN — SODIUM CHLORIDE, POTASSIUM CHLORIDE, SODIUM LACTATE AND CALCIUM CHLORIDE: 600; 310; 30; 20 INJECTION, SOLUTION INTRAVENOUS at 22:39

## 2022-10-29 RX ADMIN — PANTOPRAZOLE SODIUM 40 MG: 40 INJECTION, POWDER, FOR SOLUTION INTRAVENOUS at 22:34

## 2022-10-29 RX ADMIN — METOPROLOL SUCCINATE 75 MG: 25 TABLET, EXTENDED RELEASE ORAL at 08:42

## 2022-10-29 RX ADMIN — PANTOPRAZOLE SODIUM 40 MG: 40 INJECTION, POWDER, FOR SOLUTION INTRAVENOUS at 13:03

## 2022-10-29 RX ADMIN — ATORVASTATIN CALCIUM 80 MG: 80 TABLET, FILM COATED ORAL at 08:42

## 2022-10-29 RX ADMIN — HYDROMORPHONE HYDROCHLORIDE 0.2 MG: 0.2 INJECTION, SOLUTION INTRAMUSCULAR; INTRAVENOUS; SUBCUTANEOUS at 05:48

## 2022-10-29 RX ADMIN — METOPROLOL SUCCINATE 75 MG: 25 TABLET, EXTENDED RELEASE ORAL at 20:53

## 2022-10-29 RX ADMIN — PREGABALIN 150 MG: 100 CAPSULE ORAL at 18:10

## 2022-10-29 RX ADMIN — HYDROMORPHONE HYDROCHLORIDE 0.2 MG: 0.2 INJECTION, SOLUTION INTRAMUSCULAR; INTRAVENOUS; SUBCUTANEOUS at 11:05

## 2022-10-29 RX ADMIN — TAMSULOSIN HYDROCHLORIDE 0.4 MG: 0.4 CAPSULE ORAL at 08:42

## 2022-10-29 RX ADMIN — TAZOBACTAM SODIUM AND PIPERACILLIN SODIUM 4.5 G: 500; 4 INJECTION, SOLUTION INTRAVENOUS at 20:54

## 2022-10-29 ASSESSMENT — ACTIVITIES OF DAILY LIVING (ADL)
ADLS_ACUITY_SCORE: 26

## 2022-10-29 NOTE — PROGRESS NOTES
"CLINICAL NUTRITION SERVICES - ASSESSMENT NOTE     Nutrition Prescription    RECOMMENDATIONS FOR MDs/PROVIDERS TO ORDER:  None at this time    Malnutrition Status:    Unable to determine due to lack of physical assessment    Recommendations already ordered by Registered Dietitian (RD):  If TPN becomes plan of care, rec via central line:  Dosing weight: 80 kg   Begin with minimal volume (1000 mL) or max concentration per phamD with initial dextrose of 150 g (GIR= 2.6 mg/kg/min), 120 g AA (1.5 g/kg) and 250 mL 20% intralipid daily = 1490 kcal (19 kcal/kg)  -Once patient receives 100% of initial PN volume with K>/=3, Mg>/=1.5, and Po4>/=2, advance dextrose by 50-75 g every 1-2 days to goal of 300g (GIR= 2.6 mg/kg/min)  --TPN at goal concentration provides: 300 g dex (1020 kcal), 120 g AA (1.5 g/kg) and 250 mL 20% intralipid daily = 2000 kcal (25 kcal/kg) and 25 % kcal from fat   --Electrolytes/Additives per pharmD, recommend infuvite daily and MTE-4       --Monitor bili, LFTs, TG at the start and weekly thereafter while on TPN  --Monitor for onset of hyperglycemia and need for addition of insulin per pharmD/MD    Future/Additional Recommendations:  Monitor TPN needs, labs, weights     REASON FOR ASSESSMENT  Antoine Russo is a/an 76 year old male assessed by the dietitian for Pharmacy/Nutrition to Start and Manage PN    CURRENT NUTRITION ORDERS  Diet: NPO    MEDICAL/NUTRITION HISTORY:   Pt presented to the emergency department for evaluation of epigastric pain and was found to have evidence of gastritis on imaging and concern for possible ulcer for which he is being admitted for further evaluation and treatment.     LABS  Labs reviewed: Glucose 128 10/27--> 90 10/28, lipase 88 (H)    MEDICATIONS  Medications reviewed: insulin, protonix, zosyn, LR    ANTHROPOMETRICS  Height: 177.8 cm (5' 10\")  Most Recent Weight: 95 kg (209 lb 7 oz)   IBW: 75.45 kg (126% IBW)  BMI: Obesity Grade I BMI 30-34.9  Weight History: Pt with " non-significant 12 lb (6%) weight loss in 8 months.  Weight gain during admission likely related to fluids.   Wt Readings from Last  Encounters:   10/29/22 95 kg (209 lb 7 oz)   10/27/22 92.3 kg (203 lb 7.8 oz)   10/25/22 91.8 kg (202 lb 6.1 oz)   10/22/22 90.7 kg (200 lb)   10/20/22 96.2 kg (212 lb)   09/30/22 95.3 kg (210 lb)   09/13/22 93 kg (205 lb)   04/13/22 99.8 kg (220 lb)   02/19/22 97.3 kg (214 lb 9.6 oz)   02/04/22 90.7 kg (200 lb)   01/18/22 94.8 kg (209 lb)   10/14/21 95.7 kg (211 lb)   09/22/21 94.6 kg (208 lb 9.6 oz)   08/19/21 90.7 kg (200 lb)   07/13/21 93.9 kg (207 lb)   02/22/21 93.7 kg (206 lb 9.6 oz)     Dosing Weight: 80 kg - ABW using admission weight and IBW of 75.45 kg.      ASSESSED NUTRITION NEEDS  Estimated Energy Needs: 8973-6724 kcals/day (25 - 30 kcals/kg)  Justification: Maintenance  Estimated Protein Needs:  grams protein/day (1.2 - 1.5 grams of pro/kg)  Justification: Increased needs  Estimated Fluid Needs: 1 mL/kcal or per provider pending fluid status    MALNUTRITION  % Intake: Unable to assess   % Weight Loss: Weight loss does not meet criteria  Subcutaneous Fat Loss: Unable to assess  Muscle Loss: Unable to assess  Fluid Accumulation/Edema: Does not meet criteria  Malnutrition Diagnosis: Unable to determine due to lack of physical assessment    NUTRITION DIAGNOSIS  Inadequate oral intake related to possible perforated gastric ulcer vs pancreatitis as evidenced by NPO order and consult for TPN.       INTERVENTIONS  Implementation  Parenteral Nutrition/IV Fluids - Initiate     Goals  Diet adv v nutrition support within 2-3 days.  Total avg nutritional intake to meet a minimum of 25 kcal/kg and 1.5 g PRO/kg daily (per dosing wt 80 kg).     Monitoring/Evaluation  Progress toward goals will be monitored and evaluated per protocol.    Keesha Fair MS, RDN, LDN  RD pager: [phone]  WB Weekend/Holiday Pager: [phone]    Progress toward goals will be monitored and evaluated  per protocol.

## 2022-10-29 NOTE — PROGRESS NOTES
Alert and oriented. Up independently in room.   Frustrated about transfer status.   IV infusing, NPO except for meds.  Waiting to get PICC placed to received TPN.   IV Dilaudid given as needed for sharp abdominal pain.  Patient voiding and having bowel movements. BM black in color.

## 2022-10-29 NOTE — PROGRESS NOTES
"[facility]    Hospitalist Progress Note    Date of Service (when I saw the patient): 10/29/2022    Assessment & Plan   Antoine Russo is a 76 year old male admitted on 10/25/2022. He presented to the emergency department for evaluation of epigastric pain and was found to have evidence of gastritis on imaging and concern for possible ulcer for which he is being admitted for further evaluation and treatment.     Acute gastritis without hemorrhage, concern for perforated peptic ulcer disease  Acute blood loss anemia  Presented with epigastric pain, CT abdomen & pelvis demonstrates \"Diffuse gastric wall thickening has increased since the previous exam and there is tubular new fluid density along the lesser curvature of the stomach. Differential would include deep ulcer or abscess, although the mucosa appears intact overlying this. This has developed since 9/13/2022. Given the fluid and inflammation surrounding the stomach and the fact that it abuts the resection margin of the pancreas, pancreatitis predominantly involving inflammation of the stomach cannot be excluded, but is considered unlikely. Endoscopic ultrasound may be useful for further evaluation...\" Admit hemoglobin normal (15.6), no evidence of bleeding.   Patient's personal risk factors for gastritis include near daily alcohol use, new clopidogrel use, and coumadin use.   Case discussed between emergency department and general surgery.  - INR of 1.81 in ED reversed prior to EGD, given vitamin K -> 1.31  - Hemoglobin decreased from 15.6 on 10/25 to 13.2 on 10/27  - General Surgery consult apprecated  - EGD on 10/26 demonstrated diffusely erythematous gastric mucosa, with bleeding in gastric antrum with pus.    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  He has history of necrotizing pancreatitis, pancreatic duct leak, and a history of a stent in 2018, since removed. Patient does admit to near daily drinking. CT abdomen & pelvis demonstrates \"... Given the fluid and inflammation surrounding the stomach and the fact that it abuts the resection margin of the pancreas, pancreatitis predominantly involving inflammation of the stomach cannot be excluded, but is considered unlikely. Endoscopic ultrasound may be useful for further evaluation... 3.  Splenectomy and distal pancreatectomy. No evidence for inflammation of the residual pancreas.\" Admit lipase 200.   - LR @ 100 ml/hr  - NPO  - Antiemetics and analgesia noted above     Renal cysts  Incidental finding on CT abdomen & pelvis \"2.  Multiple cysts are again seen throughout both kidneys. One hemorrhagic cyst versus solid mass arising from the lateral right kidney is unchanged. MRI could further evaluate if indicated.\"   - Consider renal MRI as outpatient      Chronic atrial fibrillation   Long term current use of anticoagulant therapy / Drug Induced Coagulation Defect  Rate controlled prior to admission with metoprolol XL 75 mg bid. Anticoagulated prior to admission with coumadin, admit INR 1.81.   - Continue metoprolol   - INR reversed prior to EGD  - Continue to hold coumadin     Alcohol abuse  Patient admits to near daily alcohol use, \"a few beers a day.\" Last drink on 10/22. No evidence of withdrawal at time of admission.  - No CIWA indicated, but monitor for evidence of withdrawal     Type 2 diabetes mellitus with diabetic polyneuropathy, without " long-term current use of insulin (H)  HgbA1c 8.2. Managed prior to admission with Jardiance 10 mg daily and Novolin NPH 40 U q am / 32 U q pm. Patient has not been using his insulin recently due to poor oral intake.   - Hold Novolin NPH while NPO  - Hold Jardiance  - High sliding scale insulin (NPO regimen)  - Hyper/hypoglycemia protocols  - Will likely need to restart NPH with TPN     CAD (coronary artery disease)  Peripheral vascular disease / SFA disease  Hyperlipidemia LDL goal <100  Drug Induced Platelet Defect  Known PVD, follows with vascular Dr. Stacy. Recent left lower extremity angiogram on 9/30/22. Known CAD on prior stress tests, but has never had PCI or CABG interventions. Managed prior to admission with atorvastatin 80 mg daily and clopidogrel 75 mg daily.   - Continue atorvastatin  - Clopidogrel on hold      Leukocytosis with left shift  Spleen absent  Admit WBC 16.0, ANC 13.9. 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 Unclear if he is up to date on immunizations. No indication for antibiotics at present.  - Monitor  - CBC in am      Hypertension, benign essential, goal below 140/90  Blood pressure intermittently elevated. Managed prior to admission with lisinopril 10 mg daily and metoprolol XL 75 mg bid.   - Continue lisinopril and metoprolol with holding parameters     Hypertrophy of prostate without urinary obstruction  Managed prior to admission with Flomax 0.4 mg daily, continue.      GERD (gastroesophageal reflux disease)  Noted on problem list, not on PPI or H2 blocker prior to admission.  - Start PPI as above     Peripheral polyneuropathy  Managed prior to admission with Lyrica 150 mg q pm, continue.      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 Not acute. Continue with outpatient management.    Diet: NPO per Anesthesia  Guidelines for Procedure/Surgery Except for: Meds  DVT Prophylaxis: Warfarin prior to admission, currently undergoing INR reversal  Chavez Catheter: Not present  Central Lines: None  Cardiac Monitoring: None  Code Status: Full Code    Disposition: Patient is currently awaiting transfer to [facility] for advanced endoscopy.    Yassine Wright MD    Interval History   The patient is resting in bed.  He has minimal abdominal pain without fevers or chills.  No acute complaints.       -Data reviewed today: I reviewed all new labs and imaging results over the last 24 hours. I personally reviewed no images or EKG's today.    Physical Exam   Temp: 98.6  F (37  C) Temp src: Oral BP: (!) 146/71 Pulse: 69   Resp: 18 SpO2: 92 % O2 Device: None (Room air)    Vitals:    10/25/22 2123 10/27/22 0624 10/29/22 1021   Weight: 91.8 kg (202 lb 6.1 oz) 92.3 kg (203 lb 7.8 oz) 95 kg (209 lb 7 oz)     Vital Signs with Ranges  Temp:  [97.2  F (36.2  C)-98.8  F (37.1  C)] 98.6  F (37  C)  Pulse:  [67-76] 69  Resp:  [16-18] 18  BP: (134-149)/(64-75) 146/71  SpO2:  [90 %-93 %] 92 %  I/O last 3 completed shifts:  In: 1581 [I.V.:1581]  Out: -     Gen: Well nourished, well developed, alert and oriented x 3, no acute distressed  HEENT: Atraumatic, normocephalic; sclera non-injected, anicterric; oral mucosa moist, no lesion, no exudate  Lungs: Clear to ausculation, no wheezes, no rhonchi, no rales  Heart: Regular rate, regular rhythm, no gallops, no rubs, no murmurs  GI: Bowel sound decreased, no hepatosplenomegaly, no masses, minimally tender, non-distended, no guarding, no rebound tenderness  Lymph: No lymphadenopathy, no edema  Skin: No rashes, no chronic venous stasis     Medications     dextrose       lactated ringers 100 mL/hr at 10/29/22 1036       atorvastatin  80 mg Oral Daily     [Held by provider] clopidogrel  75 mg Oral Daily     [Held by provider] empagliflozin  10 mg Oral Daily     insulin aspart  1-12 Units Subcutaneous Q4H      lisinopril  10 mg Oral Daily     metoprolol succinate ER  75 mg Oral BID     pantoprazole  40 mg Intravenous Q12H     piperacillin-tazobactam  4.5 g Intravenous Q6H     pregabalin  150 mg Oral QPM     sodium chloride (PF)  3 mL Intracatheter Q8H     tamsulosin  0.4 mg Oral Daily       Data   Recent Labs   Lab 10/29/22  1527 10/29/22  1409 10/29/22  1013 10/29/22  0627 10/29/22  0542 10/28/22  2215 10/28/22  2129 10/28/22  0614 10/28/22  0602 10/27/22  0806 10/27/22  0530 10/27/22  0108 10/26/22  2206 10/26/22  1730 10/26/22  1355 10/26/22  0832 10/26/22  0524 10/25/22  2319 10/25/22  0920   WBC  --   --   --   --   --   --   --   --  7.0  --  8.6  --   --   --   --   --  14.5*  --  16.0*   HGB  --  13.0*  --   --  13.8  --  13.3   < > 12.8*  12.8*   < > 13.2*  13.2*  --  13.9  --  14.9  --  15.3  --  15.6   MCV  --   --   --   --   --   --   --   --  103*  --  101*  --   --   --   --   --  104*  --  102*   PLT  --   --   --   --   --   --   --   --  145*  --  156  --   --   --   --   --  147*  --  169   INR  --   --   --   --   --   --   --   --  1.26*  --   --   --  1.31*  --  1.35*  --  1.55*  --  1.81*   NA  --   --   --   --   --   --   --   --  136  --  137  --   --   --   --   --  137  --  135*   POTASSIUM  --   --   --   --   --   --   --   --  4.1  --  4.2  --   --   --   --   --  4.4  --  4.7   CHLORIDE  --   --   --   --   --   --   --   --  103  --  102  --   --   --   --   --  100  --  96*   CO2  --   --   --   --   --   --   --   --  23  --  22  --   --   --   --   --  23  --  24   BUN  --   --   --   --   --   --   --   --  15.5  --  19.3  --   --   --   --   --  17.1  --  20.1   CR  --   --   --   --   --   --   --   --  0.83  --  0.83  --   --   --   --   --  0.87  --  0.94   ANIONGAP  --   --   --   --   --   --   --   --  10  --  13  --   --   --   --   --  14  --  15   NILSON  --   --   --   --   --   --   --   --  8.8  --  9.0  --   --   --   --   --  9.3  --  9.7   GLC 78  --  83 87  --    < >   --    < > 90   < > 128*   < >  --    < >  --    < > 110*   < > 165*   ALBUMIN  --   --   --   --   --   --   --   --   --   --  2.9*  --   --   --   --   --  2.9*  --  3.4*   PROTTOTAL  --   --   --   --   --   --   --   --   --   --  6.1*  --   --   --   --   --  6.5  --  7.3   BILITOTAL  --   --   --   --   --   --   --   --   --   --  1.1  --   --   --   --   --  1.8*  --  1.4*   ALKPHOS  --   --   --   --   --   --   --   --   --   --  77  --   --   --   --   --  69  --  75   ALT  --   --   --   --   --   --   --   --   --   --  18  --   --   --   --   --  14  --  19   AST  --   --   --   --   --   --   --   --   --   --  25  --   --   --   --   --  24  --  24   LIPASE  --   --   --   --   --   --   --   --   --   --  88*  --   --   --   --   --   --   --  200*    < > = values in this interval not displayed.       No results found for this or any previous visit (from the past 24 hour(s)).

## 2022-10-29 NOTE — PLAN OF CARE
Goal Outcome Evaluation:      Plan of Care Reviewed With:  Provider    Overall Patient Progress: improving    Outcome Evaluation: Patient with need for TPN to meet needs, see RD note 10/29 for recommendations.

## 2022-10-29 NOTE — PLAN OF CARE
Pt denied discomfort most of night, then @ 0550 stated his abdomen was beginning to burn & be painful. Given 0.2mg dilaudid w/ good relief. Pt denies nausea. Has been up to BR independent in room, voiding good amounts, had 1 medium loose stool.

## 2022-10-30 LAB
GLUCOSE BLDC GLUCOMTR-MCNC: 143 MG/DL (ref 70–99)
GLUCOSE BLDC GLUCOMTR-MCNC: 157 MG/DL (ref 70–99)
GLUCOSE BLDC GLUCOMTR-MCNC: 160 MG/DL (ref 70–99)
GLUCOSE BLDC GLUCOMTR-MCNC: 168 MG/DL (ref 70–99)
GLUCOSE BLDC GLUCOMTR-MCNC: 178 MG/DL (ref 70–99)
GLUCOSE BLDC GLUCOMTR-MCNC: 178 MG/DL (ref 70–99)
HGB BLD-MCNC: 13.4 G/DL (ref 13.3–17.7)
INR PPP: 1.34 (ref 0.85–1.15)
MAGNESIUM SERPL-MCNC: 1.7 MG/DL (ref 1.7–2.3)
PHOSPHATE SERPL-MCNC: 1.9 MG/DL (ref 2.5–4.5)

## 2022-10-30 PROCEDURE — 250N000011 HC RX IP 250 OP 636: Performed by: PHYSICIAN ASSISTANT

## 2022-10-30 PROCEDURE — 250N000011 HC RX IP 250 OP 636: Performed by: INTERNAL MEDICINE

## 2022-10-30 PROCEDURE — 85018 HEMOGLOBIN: CPT | Performed by: INTERNAL MEDICINE

## 2022-10-30 PROCEDURE — 250N000012 HC RX MED GY IP 250 OP 636 PS 637: Performed by: PHYSICIAN ASSISTANT

## 2022-10-30 PROCEDURE — 120N000001 HC R&B MED SURG/OB

## 2022-10-30 PROCEDURE — 250N000013 HC RX MED GY IP 250 OP 250 PS 637: Performed by: INTERNAL MEDICINE

## 2022-10-30 PROCEDURE — 250N000009 HC RX 250: Performed by: INTERNAL MEDICINE

## 2022-10-30 PROCEDURE — 85610 PROTHROMBIN TIME: CPT | Performed by: INTERNAL MEDICINE

## 2022-10-30 PROCEDURE — 258N000003 HC RX IP 258 OP 636: Performed by: INTERNAL MEDICINE

## 2022-10-30 PROCEDURE — 99232 SBSQ HOSP IP/OBS MODERATE 35: CPT | Performed by: INTERNAL MEDICINE

## 2022-10-30 PROCEDURE — 84100 ASSAY OF PHOSPHORUS: CPT | Performed by: INTERNAL MEDICINE

## 2022-10-30 PROCEDURE — C9113 INJ PANTOPRAZOLE SODIUM, VIA: HCPCS | Performed by: PHYSICIAN ASSISTANT

## 2022-10-30 PROCEDURE — 250N000013 HC RX MED GY IP 250 OP 250 PS 637: Performed by: PHYSICIAN ASSISTANT

## 2022-10-30 PROCEDURE — 250N000012 HC RX MED GY IP 250 OP 636 PS 637: Performed by: INTERNAL MEDICINE

## 2022-10-30 PROCEDURE — 84134 ASSAY OF PREALBUMIN: CPT | Performed by: INTERNAL MEDICINE

## 2022-10-30 PROCEDURE — 83735 ASSAY OF MAGNESIUM: CPT | Performed by: INTERNAL MEDICINE

## 2022-10-30 RX ADMIN — INSULIN ASPART 2 UNITS: 100 INJECTION, SOLUTION INTRAVENOUS; SUBCUTANEOUS at 17:42

## 2022-10-30 RX ADMIN — PANTOPRAZOLE SODIUM 40 MG: 40 INJECTION, POWDER, FOR SOLUTION INTRAVENOUS at 23:40

## 2022-10-30 RX ADMIN — SODIUM PHOSPHATE, MONOBASIC, MONOHYDRATE 15 MMOL: 276; 142 INJECTION, SOLUTION INTRAVENOUS at 16:31

## 2022-10-30 RX ADMIN — TAMSULOSIN HYDROCHLORIDE 0.4 MG: 0.4 CAPSULE ORAL at 09:23

## 2022-10-30 RX ADMIN — TAZOBACTAM SODIUM AND PIPERACILLIN SODIUM 4.5 G: 500; 4 INJECTION, SOLUTION INTRAVENOUS at 02:14

## 2022-10-30 RX ADMIN — INSULIN ASPART 1 UNITS: 100 INJECTION, SOLUTION INTRAVENOUS; SUBCUTANEOUS at 22:50

## 2022-10-30 RX ADMIN — PREGABALIN 150 MG: 100 CAPSULE ORAL at 17:41

## 2022-10-30 RX ADMIN — ATORVASTATIN CALCIUM 80 MG: 80 TABLET, FILM COATED ORAL at 09:23

## 2022-10-30 RX ADMIN — INSULIN ASPART 2 UNITS: 100 INJECTION, SOLUTION INTRAVENOUS; SUBCUTANEOUS at 11:20

## 2022-10-30 RX ADMIN — I.V. FAT EMULSION 250 ML: 20 EMULSION INTRAVENOUS at 21:31

## 2022-10-30 RX ADMIN — TAZOBACTAM SODIUM AND PIPERACILLIN SODIUM 3.38 G: 375; 3 INJECTION, SOLUTION INTRAVENOUS at 15:45

## 2022-10-30 RX ADMIN — HYDROMORPHONE HYDROCHLORIDE 0.2 MG: 0.2 INJECTION, SOLUTION INTRAMUSCULAR; INTRAVENOUS; SUBCUTANEOUS at 11:27

## 2022-10-30 RX ADMIN — METOPROLOL SUCCINATE 75 MG: 25 TABLET, EXTENDED RELEASE ORAL at 09:22

## 2022-10-30 RX ADMIN — METOPROLOL SUCCINATE 75 MG: 25 TABLET, EXTENDED RELEASE ORAL at 19:42

## 2022-10-30 RX ADMIN — EMPAGLIFLOZIN 10 MG: 10 TABLET, FILM COATED ORAL at 09:23

## 2022-10-30 RX ADMIN — INSULIN GLARGINE 10 UNITS: 100 INJECTION, SOLUTION SUBCUTANEOUS at 09:26

## 2022-10-30 RX ADMIN — HYDROMORPHONE HYDROCHLORIDE 0.2 MG: 0.2 INJECTION, SOLUTION INTRAMUSCULAR; INTRAVENOUS; SUBCUTANEOUS at 06:06

## 2022-10-30 RX ADMIN — INSULIN ASPART 1 UNITS: 100 INJECTION, SOLUTION INTRAVENOUS; SUBCUTANEOUS at 02:17

## 2022-10-30 RX ADMIN — SODIUM PHOSPHATE, MONOBASIC, MONOHYDRATE 15 MMOL: 276; 142 INJECTION, SOLUTION INTRAVENOUS at 18:47

## 2022-10-30 RX ADMIN — INSULIN ASPART 2 UNITS: 100 INJECTION, SOLUTION INTRAVENOUS; SUBCUTANEOUS at 06:10

## 2022-10-30 RX ADMIN — PANTOPRAZOLE SODIUM 40 MG: 40 INJECTION, POWDER, FOR SOLUTION INTRAVENOUS at 11:12

## 2022-10-30 RX ADMIN — Medication: at 09:19

## 2022-10-30 RX ADMIN — HYDROMORPHONE HYDROCHLORIDE 0.2 MG: 0.2 INJECTION, SOLUTION INTRAMUSCULAR; INTRAVENOUS; SUBCUTANEOUS at 22:46

## 2022-10-30 RX ADMIN — ASCORBIC ACID, VITAMIN A PALMITATE, CHOLECALCIFEROL, THIAMINE HYDROCHLORIDE, RIBOFLAVIN-5 PHOSPHATE SODIUM, PYRIDOXINE HYDROCHLORIDE, NIACINAMIDE, DEXPANTHENOL, ALPHA-TOCOPHEROL ACETATE, VITAMIN K1, FOLIC ACID, BIOTIN, CYANOCOBALAMIN: 200; 3300; 200; 6; 3.6; 6; 40; 15; 10; 150; 600; 60; 5 INJECTION, SOLUTION INTRAVENOUS at 21:31

## 2022-10-30 RX ADMIN — TAZOBACTAM SODIUM AND PIPERACILLIN SODIUM 4.5 G: 500; 4 INJECTION, SOLUTION INTRAVENOUS at 09:35

## 2022-10-30 RX ADMIN — TAZOBACTAM SODIUM AND PIPERACILLIN SODIUM 3.38 G: 375; 3 INJECTION, SOLUTION INTRAVENOUS at 21:32

## 2022-10-30 RX ADMIN — LISINOPRIL 10 MG: 10 TABLET ORAL at 09:22

## 2022-10-30 ASSESSMENT — ACTIVITIES OF DAILY LIVING (ADL)
ADLS_ACUITY_SCORE: 26

## 2022-10-30 NOTE — PROCEDURES
[facility]    Double Lumen PICC Placement    Date/Time: 10/29/2022 8:27 PM  Performed by: Yassine Wright MD  Authorized by: Yassine Wright MD   Indications: vascular access      UNIVERSAL PROTOCOL   Site Marked: Yes  Prior Images Obtained and Reviewed:  Yes  Required items: Required blood products, implants, devices and special equipment available    Patient identity confirmed:  Verbally with patient and arm band  NA - No sedation, light sedation, or local anesthesia  Confirmation Checklist:  Patient's identity using two indicators, relevant allergies, correct equipment/implants were available and procedure was appropriate and matched the consent or emergent situation  Time out: Immediately prior to the procedure a time out was called    Universal Protocol: the Joint Commission Universal Protocol was followed    Preparation: Patient was prepped and draped in usual sterile fashion    ESBL (mL):  2     ANESTHESIA    Anesthesia: Local infiltration  Local Anesthetic:  Lidocaine 1% with epinephrine      SEDATION    Patient Sedated: No        Preparation: skin prepped with 2% chlorhexidine and skin prepped with ChloraPrep  Skin prep agent: skin prep agent completely dried prior to procedure  Sterile barriers: maximum sterile barriers were used: cap, mask, sterile gown, sterile gloves, and large sterile sheet  Hand hygiene: hand hygiene performed prior to central venous catheter insertion  Type of line used: PICC  Catheter type: double lumen  Lumen type: valved and power PICC  Lumen Identification: Purple and Red  Catheter size: 5 Fr  Brand: Bard  Lot number: [id number]  Placement method: venipuncture and MST  Number of attempts: 1  Difficulty threading catheter: no  Successful placement: yes  Orientation: right    Location: basilic vein  Tip Location: SVC (Jaret HERMAN stated tip is approx 3cm from CAJ per phone conversation.  Line ok to use)  Arm circumference: adults 10 cm  Extremity  circumference: 29.5  Visible catheter length: 4  Total catheter length: 42  Dressing and securement: alcohol impregnated caps, antibiotic disc placed and securement device  Post procedure assessment: blood return through all ports, free fluid flow, placement verified by x-ray and no pneumothorax on x-ray  PROCEDURE   Patient Tolerance:  Patient tolerated the procedure well with no immediate complicationsDescribe Procedure: Initial placement showed line in right jugular.  Powerflush successfully migrated tip to SVC.  Xray report read xray was proximal/mid SVC upon conversation with Jaret HERMAN, it was suggested that tip was 3cm from CAJ so placement is satisfactory.  No adjustment necessary, line ok to use.  Patient tolerated procedure well.

## 2022-10-30 NOTE — PROGRESS NOTES
"[facility]    Hospitalist Progress Note    Date of Service (when I saw the patient): 10/30/2022    Assessment & Plan   Antoine Russo is a 76 year old male admitted on 10/25/2022. He presented to the emergency department for evaluation of epigastric pain and was found to have evidence of gastritis on imaging and concern for possible ulcer for which he is being admitted for further evaluation and treatment.     Acute gastritis without hemorrhage, concern for perforated peptic ulcer disease  Acute blood loss anemia  Presented with epigastric pain, CT abdomen & pelvis demonstrates \"Diffuse gastric wall thickening has increased since the previous exam and there is tubular new fluid density along the lesser curvature of the stomach. Differential would include deep ulcer or abscess, although the mucosa appears intact overlying this. This has developed since 9/13/2022. Given the fluid and inflammation surrounding the stomach and the fact that it abuts the resection margin of the pancreas, pancreatitis predominantly involving inflammation of the stomach cannot be excluded, but is considered unlikely. Endoscopic ultrasound may be useful for further evaluation...\" Admit hemoglobin normal (15.6), no evidence of bleeding.   Patient's personal risk factors for gastritis include near daily alcohol use, new clopidogrel use, and coumadin use.   Case discussed between emergency department and general surgery.  - INR of 1.81 in ED reversed prior to EGD, given vitamin K -> 1.31  - Hemoglobin decreased from 15.6 on 10/25 to 13.2 on 10/27  - General Surgery consult apprecated  - EGD on 10/26 demonstrated diffusely erythematous gastric mucosa, with bleeding in gastric antrum with pus.    - Pt given FFP, tranexamic acid, and vitamin K post EGD  - Surgeon concerned for perforation and recommending transfer for advanced endoscopy  - Discussed with GI at [facility], agree with need for advanced endoscopy and " "possible EUS  - Start Zosyn on 10/26  - IV Protonix 40 mg q12h  - Continue NPO due to concern for perforation  - Antiemetics and analgesia available  - Placed PICC line on 10/29 and started TPN  - Attempted to reach Endoscopy at [facility] to discuss other options, but no call back from page. Recommend contacting Endoscopy at [facility] to rediscuss treatment options or destinations as there are no beds available at [facility].     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  He has history of necrotizing pancreatitis, pancreatic duct leak, and a history of a stent in 2018, since removed. Patient does admit to near daily drinking. CT abdomen & pelvis demonstrates \"... Given the fluid and inflammation surrounding the stomach and the fact that it abuts the resection margin of the pancreas, pancreatitis predominantly involving inflammation of the stomach cannot be excluded, but is considered unlikely. Endoscopic ultrasound may be useful for further evaluation... 3.  Splenectomy and distal pancreatectomy. No evidence for inflammation of the residual pancreas.\" Admit lipase 200.   - LR @ 100 ml/hr  - NPO  - Antiemetics and analgesia noted above     Renal cysts  Incidental finding on CT abdomen & pelvis \"2.  Multiple cysts are again seen throughout both kidneys. One hemorrhagic cyst versus solid mass arising from the lateral right kidney is unchanged. MRI could further evaluate if indicated.\"   - Consider renal MRI as outpatient      Chronic atrial fibrillation   Long term current use of anticoagulant therapy / Drug Induced Coagulation Defect  Rate controlled prior to admission with metoprolol XL 75 mg bid. Anticoagulated prior to admission with coumadin, admit INR 1.81.   - Continue metoprolol   - INR reversed prior to EGD  - Continue to hold coumadin in anticipation of EGD procedure at [facility]     Alcohol " "abuse  Patient admits to near daily alcohol use, \"a few beers a day.\" Last drink on 10/22. No evidence of withdrawal at time of admission.  - No CIWA indicated, but monitor for evidence of withdrawal     Type 2 diabetes mellitus with diabetic polyneuropathy, without long-term current use of insulin (H)  HgbA1c 8.2. Managed prior to admission with Jardiance 10 mg daily and Novolin NPH 40 U q am / 32 U q pm. Patient has not been using his insulin recently due to poor oral intake.   - Hold Novolin NPH while NPO  - Held Jardiance on admission, resumed 10/30  - High sliding scale insulin (NPO regimen)  - Hyper/hypoglycemia protocols  - Start Lantus 10 units qAM on 10/30     CAD (coronary artery disease)  Peripheral vascular disease / SFA disease  Hyperlipidemia LDL goal <100  Drug Induced Platelet Defect  Known PVD, follows with vascular Dr. Stacy. Recent left lower extremity angiogram on 9/30/22. Known CAD on prior stress tests, but has never had PCI or CABG interventions. Managed prior to admission with atorvastatin 80 mg daily and clopidogrel 75 mg daily.   - Continue atorvastatin  - Clopidogrel on hold      Leukocytosis with left shift  Spleen absent  Admit WBC 16.0, ANC 13.9. 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 Unclear if he is up to date on immunizations. No indication for antibiotics at present.  - Monitor  - CBC in am      Hypertension, benign essential, goal below 140/90  Blood pressure intermittently elevated. Managed prior to admission with lisinopril 10 mg daily and metoprolol XL 75 mg bid.   - Continue lisinopril and metoprolol with holding parameters     Hypertrophy of prostate without urinary obstruction  Managed prior to admission with Flomax 0.4 mg daily, continue.      GERD (gastroesophageal reflux disease)  Noted on problem list, not on PPI or H2 blocker prior to admission.  - Start PPI as above     Peripheral polyneuropathy  Managed prior to admission with Lyrica 150 mg q " pm, continue.      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 Not acute. Continue with outpatient management.    Diet: NPO per Anesthesia Guidelines for Procedure/Surgery Except for: Meds  DVT Prophylaxis: Warfarin prior to admission, currently undergoing INR reversal  Chavez Catheter: Not present  Central Lines: None  Cardiac Monitoring: None  Code Status: Full Code    Disposition: Patient is currently awaiting transfer to [facility] for advanced endoscopy.    Yassine Wright MD    Interval History   The patient is resting in bed.  He has moderate abdominal pain with palpation but has no pain otherwise.     -Data reviewed today: I reviewed all new labs and imaging results over the last 24 hours. I personally reviewed no images or EKG's today.    Physical Exam   Temp: 97.3  F (36.3  C) Temp src: Oral BP: (!) 145/71 Pulse: 87   Resp: 18 SpO2: 92 % O2 Device: None (Room air)    Vitals:    10/25/22 2123 10/27/22 0624 10/29/22 1021   Weight: 91.8 kg (202 lb 6.1 oz) 92.3 kg (203 lb 7.8 oz) 95 kg (209 lb 7 oz)     Vital Signs with Ranges  Temp:  [97.2  F (36.2  C)-98  F (36.7  C)] 97.3  F (36.3  C)  Pulse:  [65-87] 87  Resp:  [16-18] 18  BP: (145-162)/(70-76) 145/71  SpO2:  [89 %-93 %] 92 %  No intake/output data recorded.    Gen: Well nourished, well developed, alert and oriented x 3, no acute distressed  HEENT: Atraumatic, normocephalic; sclera non-injected, anicterric; oral mucosa moist, no lesion, no exudate  Lungs: Clear to ausculation, no wheezes, no rhonchi, no rales  Heart: Regular rate, regular rhythm, no gallops, no rubs, no murmurs  GI: Bowel sound decreased, no hepatosplenomegaly, no masses, minimally tender, non-distended, no guarding, no rebound tenderness  Lymph: No lymphadenopathy, no edema  Skin: No rashes, no chronic venous stasis     Medications     dextrose        lactated ringers 60 mL/hr at 10/30/22 1114     parenteral nutrition - Clinimix E       parenteral nutrition - Clinimix E 40 mL/hr at 10/30/22 1119       atorvastatin  80 mg Oral Daily     [Held by provider] clopidogrel  75 mg Oral Daily     lipids  250 mL Intravenous Q24H    And     - MEDICATION INSTRUCTIONS -   Does not apply Q24H     empagliflozin  10 mg Oral Daily     insulin aspart  1-12 Units Subcutaneous Q4H     insulin glargine  10 Units Subcutaneous QAM AC     lisinopril  10 mg Oral Daily     metoprolol succinate ER  75 mg Oral BID     pantoprazole  40 mg Intravenous Q12H     piperacillin-tazobactam  3.375 g Intravenous Q6H     pregabalin  150 mg Oral QPM     sodium chloride (PF)  3 mL Intracatheter Q8H     tamsulosin  0.4 mg Oral Daily       Data   Recent Labs   Lab 10/30/22  1018 10/30/22  0609 10/30/22  0602 10/30/22  0206 10/29/22  2345 10/29/22  1818 10/29/22  1806 10/29/22  1527 10/29/22  1409 10/28/22  0614 10/28/22  0602 10/27/22  0806 10/27/22  0530 10/27/22  0108 10/26/22  2206 10/26/22  0832 10/26/22  0524 10/25/22  2319 10/25/22  0920   WBC  --   --   --   --   --   --   --   --   --   --  7.0  --  8.6  --   --   --  14.5*  --  16.0*   HGB  --   --  13.4  --  13.0*  --   --   --  13.0*   < > 12.8*  12.8*   < > 13.2*  13.2*  --  13.9   < > 15.3  --  15.6   MCV  --   --   --   --   --   --   --   --   --   --  103*  --  101*  --   --   --  104*  --  102*   PLT  --   --   --   --   --   --   --   --   --   --  145*  --  156  --   --   --  147*  --  169   INR  --   --  1.34*  --   --   --   --   --   --   --  1.26*  --   --   --  1.31*   < > 1.55*  --  1.81*   NA  --   --   --   --   --   --  138  --   --   --  136  --  137  --   --   --  137  --  135*   POTASSIUM  --   --   --   --   --   --  3.8  --   --   --  4.1  --  4.2  --   --   --  4.4  --  4.7   CHLORIDE  --   --   --   --   --   --  99  --   --   --  103  --  102  --   --   --  100  --  96*   CO2  --   --   --   --   --   --  24  --   --    --  23  --  22  --   --   --  23  --  24   BUN  --   --   --   --   --   --  9.4  --   --   --  15.5  --  19.3  --   --   --  17.1  --  20.1   CR  --   --   --   --   --   --  0.81  --   --   --  0.83  --  0.83  --   --   --  0.87  --  0.94   ANIONGAP  --   --   --   --   --   --  15  --   --   --  10  --  13  --   --   --  14  --  15   NILSON  --   --   --   --   --   --  8.9  --   --   --  8.8  --  9.0  --   --   --  9.3  --  9.7   * 178*  --  160*  --    < > 83   < >  --    < > 90   < > 128*   < >  --    < > 110*   < > 165*   ALBUMIN  --   --   --   --   --   --  2.9*  --   --   --   --   --  2.9*  --   --   --  2.9*  --  3.4*   PROTTOTAL  --   --   --   --   --   --  6.2*  --   --   --   --   --  6.1*  --   --   --  6.5  --  7.3   BILITOTAL  --   --   --   --   --   --  0.8  --   --   --   --   --  1.1  --   --   --  1.8*  --  1.4*   ALKPHOS  --   --   --   --   --   --  74  --   --   --   --   --  77  --   --   --  69  --  75   ALT  --   --   --   --   --   --  17  --   --   --   --   --  18  --   --   --  14  --  19   AST  --   --   --   --   --   --  26  --   --   --   --   --  25  --   --   --  24  --  24   LIPASE  --   --   --   --   --   --   --   --   --   --   --   --  88*  --   --   --   --   --  200*    < > = values in this interval not displayed.       Recent Results (from the past 24 hour(s))   XR Chest Port 1 View    Narrative    EXAM: XR CHEST PORT 1 VIEW  LOCATION: [facility]  DATE/TIME: 10/29/2022 8:09 PM    INDICATION: PICC Placement  COMPARISON: The current study is dated 1958 hours.      Impression    IMPRESSION: Right-sided PICC line is identified. The tip extends superior and into the jugular vein. The heart is enlarged. No focal infiltrate, pleural effusion, or pneumothorax.    XR Chest Port 1 View    Narrative    EXAM: XR CHEST PORT 1 VIEW  LOCATION: [facility]  DATE/TIME: 10/29/2022 8:10 PM    INDICATION: PICC  placement  COMPARISON: Study is dated 2002 hours.      Impression    IMPRESSION: NG tube is identified overlying the proximal/mid SVC. The heart is enlarged. No focal infiltrate, pleural effusion, or pneumothorax.

## 2022-10-30 NOTE — PLAN OF CARE
Goal Outcome Evaluation:      Neuro: A&O x4  Cardiac:  Apical pulse irregular  Respiratory: WNL  GI/: Abdominal discomfort, PRN dilaudid utilized  Diet/appetite: NPO  Activity: Ind  Pain: Managed per MAR  Skin: Bruised forearms  LDA's:  PICC infusing LR's and TPN     Other: Phosphorus replacement ordered     Plan: Pending transfer

## 2022-10-30 NOTE — PLAN OF CARE
Pt remains NPO. Receiving prn dilaudid 0.2 for abdominal discomfort. PICC line was placed earlier this evening, TPN & lipids initiated @ 2100. BG reading have been a little higher since starting TPN; 102 & 160. Received 1 unit/sliding scale for the 160. Up independent in room. Hgb still stable @ 13.

## 2022-10-31 PROBLEM — E86.0 DEHYDRATION: Status: RESOLVED | Noted: 2021-02-16 | Resolved: 2022-10-31

## 2022-10-31 PROBLEM — D72.829 LEUKOCYTOSIS: Status: RESOLVED | Noted: 2021-02-16 | Resolved: 2022-10-31

## 2022-10-31 PROBLEM — K85.90 ACUTE RECURRENT PANCREATITIS: Status: RESOLVED | Noted: 2020-10-17 | Resolved: 2022-10-31

## 2022-10-31 PROBLEM — K29.00 ACUTE GASTRITIS: Status: RESOLVED | Noted: 2020-10-03 | Resolved: 2022-10-31

## 2022-10-31 PROBLEM — Z78.9: Chronic | Status: ACTIVE | Noted: 2017-05-23

## 2022-10-31 PROBLEM — I48.91 ATRIAL FIBRILLATION (H): Chronic | Status: ACTIVE | Noted: 2018-01-23

## 2022-10-31 PROBLEM — M54.42 ACUTE RIGHT-SIDED LOW BACK PAIN WITH LEFT-SIDED SCIATICA: Status: RESOLVED | Noted: 2020-02-04 | Resolved: 2022-10-31

## 2022-10-31 PROBLEM — R11.2 NAUSEA WITH VOMITING: Status: RESOLVED | Noted: 2021-02-16 | Resolved: 2022-10-31

## 2022-10-31 PROBLEM — K85.90 ACUTE PANCREATITIS: Status: RESOLVED | Noted: 2018-10-21 | Resolved: 2022-10-31

## 2022-10-31 LAB
ALBUMIN SERPL BCG-MCNC: 3.1 G/DL (ref 3.5–5.2)
ALP SERPL-CCNC: 77 U/L (ref 40–129)
ALT SERPL W P-5'-P-CCNC: 17 U/L (ref 10–50)
ANION GAP SERPL CALCULATED.3IONS-SCNC: 9 MMOL/L (ref 7–15)
AST SERPL W P-5'-P-CCNC: 22 U/L (ref 10–50)
BILIRUB SERPL-MCNC: 0.6 MG/DL
BLD PROD TYP BPU: NORMAL
BLD PROD TYP BPU: NORMAL
BLOOD COMPONENT TYPE: NORMAL
BLOOD COMPONENT TYPE: NORMAL
BUN SERPL-MCNC: 8.2 MG/DL (ref 8–23)
CALCIUM SERPL-MCNC: 9 MG/DL (ref 8.8–10.2)
CHLORIDE SERPL-SCNC: 101 MMOL/L (ref 98–107)
CODING SYSTEM: NORMAL
CODING SYSTEM: NORMAL
CREAT SERPL-MCNC: 0.86 MG/DL (ref 0.67–1.17)
DEPRECATED HCO3 PLAS-SCNC: 27 MMOL/L (ref 22–29)
GFR SERPL CREATININE-BSD FRML MDRD: >90 ML/MIN/1.73M2
GLUCOSE BLDC GLUCOMTR-MCNC: 137 MG/DL (ref 70–99)
GLUCOSE BLDC GLUCOMTR-MCNC: 148 MG/DL (ref 70–99)
GLUCOSE BLDC GLUCOMTR-MCNC: 153 MG/DL (ref 70–99)
GLUCOSE BLDC GLUCOMTR-MCNC: 161 MG/DL (ref 70–99)
GLUCOSE BLDC GLUCOMTR-MCNC: 166 MG/DL (ref 70–99)
GLUCOSE BLDC GLUCOMTR-MCNC: 188 MG/DL (ref 70–99)
GLUCOSE SERPL-MCNC: 170 MG/DL (ref 70–99)
INR PPP: 1.3 (ref 0.85–1.15)
ISSUE DATE AND TIME: NORMAL
ISSUE DATE AND TIME: NORMAL
MAGNESIUM SERPL-MCNC: 2 MG/DL (ref 1.7–2.3)
PHOSPHATE SERPL-MCNC: 2.4 MG/DL (ref 2.5–4.5)
PHOSPHATE SERPL-MCNC: 6 MG/DL (ref 2.5–4.5)
PHOSPHATE SERPL-MCNC: 6.2 MG/DL (ref 2.5–4.5)
POTASSIUM SERPL-SCNC: 3.7 MMOL/L (ref 3.4–5.3)
PREALB SERPL IA-MCNC: 9 MG/DL (ref 15–45)
PROT SERPL-MCNC: 6.6 G/DL (ref 6.4–8.3)
SODIUM SERPL-SCNC: 137 MMOL/L (ref 136–145)
UNIT ABO/RH: NORMAL
UNIT ABO/RH: NORMAL
UNIT NUMBER: NORMAL
UNIT NUMBER: NORMAL
UNIT STATUS: NORMAL
UNIT STATUS: NORMAL
UNIT TYPE ISBT: 8400
UNIT TYPE ISBT: 8400

## 2022-10-31 PROCEDURE — 250N000009 HC RX 250: Performed by: INTERNAL MEDICINE

## 2022-10-31 PROCEDURE — 250N000011 HC RX IP 250 OP 636: Performed by: PHYSICIAN ASSISTANT

## 2022-10-31 PROCEDURE — 84100 ASSAY OF PHOSPHORUS: CPT | Performed by: INTERNAL MEDICINE

## 2022-10-31 PROCEDURE — 250N000013 HC RX MED GY IP 250 OP 250 PS 637: Performed by: INTERNAL MEDICINE

## 2022-10-31 PROCEDURE — 250N000013 HC RX MED GY IP 250 OP 250 PS 637: Performed by: PHYSICIAN ASSISTANT

## 2022-10-31 PROCEDURE — 120N000001 HC R&B MED SURG/OB

## 2022-10-31 PROCEDURE — 80053 COMPREHEN METABOLIC PANEL: CPT | Performed by: INTERNAL MEDICINE

## 2022-10-31 PROCEDURE — 250N000011 HC RX IP 250 OP 636: Performed by: INTERNAL MEDICINE

## 2022-10-31 PROCEDURE — 84134 ASSAY OF PREALBUMIN: CPT | Performed by: INTERNAL MEDICINE

## 2022-10-31 PROCEDURE — 99232 SBSQ HOSP IP/OBS MODERATE 35: CPT | Performed by: INTERNAL MEDICINE

## 2022-10-31 PROCEDURE — 83735 ASSAY OF MAGNESIUM: CPT | Performed by: INTERNAL MEDICINE

## 2022-10-31 PROCEDURE — 85610 PROTHROMBIN TIME: CPT | Performed by: INTERNAL MEDICINE

## 2022-10-31 PROCEDURE — C9113 INJ PANTOPRAZOLE SODIUM, VIA: HCPCS | Performed by: PHYSICIAN ASSISTANT

## 2022-10-31 PROCEDURE — 82040 ASSAY OF SERUM ALBUMIN: CPT | Performed by: INTERNAL MEDICINE

## 2022-10-31 RX ADMIN — EMPAGLIFLOZIN 10 MG: 10 TABLET, FILM COATED ORAL at 07:44

## 2022-10-31 RX ADMIN — PANTOPRAZOLE SODIUM 40 MG: 40 INJECTION, POWDER, FOR SOLUTION INTRAVENOUS at 23:45

## 2022-10-31 RX ADMIN — INSULIN ASPART 1 UNITS: 100 INJECTION, SOLUTION INTRAVENOUS; SUBCUTANEOUS at 06:32

## 2022-10-31 RX ADMIN — METOPROLOL SUCCINATE 75 MG: 25 TABLET, EXTENDED RELEASE ORAL at 07:43

## 2022-10-31 RX ADMIN — ATORVASTATIN CALCIUM 80 MG: 80 TABLET, FILM COATED ORAL at 07:44

## 2022-10-31 RX ADMIN — PANTOPRAZOLE SODIUM 40 MG: 40 INJECTION, POWDER, FOR SOLUTION INTRAVENOUS at 11:24

## 2022-10-31 RX ADMIN — PREGABALIN 150 MG: 100 CAPSULE ORAL at 17:02

## 2022-10-31 RX ADMIN — TAZOBACTAM SODIUM AND PIPERACILLIN SODIUM 3.38 G: 375; 3 INJECTION, SOLUTION INTRAVENOUS at 10:16

## 2022-10-31 RX ADMIN — ASCORBIC ACID, VITAMIN A PALMITATE, CHOLECALCIFEROL, THIAMINE HYDROCHLORIDE, RIBOFLAVIN-5 PHOSPHATE SODIUM, PYRIDOXINE HYDROCHLORIDE, NIACINAMIDE, DEXPANTHENOL, ALPHA-TOCOPHEROL ACETATE, VITAMIN K1, FOLIC ACID, BIOTIN, CYANOCOBALAMIN: 200; 3300; 200; 6; 3.6; 6; 40; 15; 10; 150; 600; 60; 5 INJECTION, SOLUTION INTRAVENOUS at 20:16

## 2022-10-31 RX ADMIN — TAZOBACTAM SODIUM AND PIPERACILLIN SODIUM 3.38 G: 375; 3 INJECTION, SOLUTION INTRAVENOUS at 21:46

## 2022-10-31 RX ADMIN — HYDROMORPHONE HYDROCHLORIDE 0.2 MG: 0.2 INJECTION, SOLUTION INTRAMUSCULAR; INTRAVENOUS; SUBCUTANEOUS at 07:51

## 2022-10-31 RX ADMIN — METOPROLOL SUCCINATE 75 MG: 25 TABLET, EXTENDED RELEASE ORAL at 20:25

## 2022-10-31 RX ADMIN — TAZOBACTAM SODIUM AND PIPERACILLIN SODIUM 3.38 G: 375; 3 INJECTION, SOLUTION INTRAVENOUS at 16:14

## 2022-10-31 RX ADMIN — INSULIN GLARGINE 10 UNITS: 100 INJECTION, SOLUTION SUBCUTANEOUS at 06:33

## 2022-10-31 RX ADMIN — HYDROMORPHONE HYDROCHLORIDE 0.2 MG: 0.2 INJECTION, SOLUTION INTRAMUSCULAR; INTRAVENOUS; SUBCUTANEOUS at 04:23

## 2022-10-31 RX ADMIN — HYDROMORPHONE HYDROCHLORIDE 0.2 MG: 0.2 INJECTION, SOLUTION INTRAMUSCULAR; INTRAVENOUS; SUBCUTANEOUS at 20:26

## 2022-10-31 RX ADMIN — TAZOBACTAM SODIUM AND PIPERACILLIN SODIUM 3.38 G: 375; 3 INJECTION, SOLUTION INTRAVENOUS at 04:23

## 2022-10-31 RX ADMIN — Medication: at 08:25

## 2022-10-31 RX ADMIN — HYDROMORPHONE HYDROCHLORIDE 0.2 MG: 0.2 INJECTION, SOLUTION INTRAMUSCULAR; INTRAVENOUS; SUBCUTANEOUS at 15:33

## 2022-10-31 RX ADMIN — I.V. FAT EMULSION 250 ML: 20 EMULSION INTRAVENOUS at 20:14

## 2022-10-31 RX ADMIN — TAMSULOSIN HYDROCHLORIDE 0.4 MG: 0.4 CAPSULE ORAL at 07:44

## 2022-10-31 RX ADMIN — INSULIN ASPART 2 UNITS: 100 INJECTION, SOLUTION INTRAVENOUS; SUBCUTANEOUS at 01:40

## 2022-10-31 RX ADMIN — HYDROMORPHONE HYDROCHLORIDE 0.2 MG: 0.2 INJECTION, SOLUTION INTRAMUSCULAR; INTRAVENOUS; SUBCUTANEOUS at 11:26

## 2022-10-31 RX ADMIN — LISINOPRIL 10 MG: 10 TABLET ORAL at 07:44

## 2022-10-31 ASSESSMENT — ACTIVITIES OF DAILY LIVING (ADL)
ADLS_ACUITY_SCORE: 26

## 2022-10-31 NOTE — PROGRESS NOTES
Page sent to Dr. Andrade:    [id number]. Noted phos up to 6.2, does have TPN and lipids infusing. Would you like this validated with a lab draw?    Nino Rodriguez RN 9:28 AM

## 2022-10-31 NOTE — PLAN OF CARE
Pt continues to have L upper abdominal discomfort. No nausea. Has been reluctant to take dilaudid today because he states he's been feeling SOA & after googling the side effects of diluadid he felt uneasy taking it. Offered pt reassurance re: he's being given a small dose of 0.2mg & he's been taking it infrequently. His lungs are clear & sats on RA are 91-92%. Pt given oxygen nasal cannula @ 1L for comfort, sats 95%. He did take a prn dose of dilaudid @ 2246 for abdominal discomfort of 7/10.

## 2022-10-31 NOTE — PROGRESS NOTES
Nutrition note brief    Patient presents with acute gastritis concern for perforated ulcer disease, possible recurrent pancreatitis, pancreatic pseudocyst, renal cysts, chronic atria fibrillation, alcohol abuse, type 2 diabtes, CAD, PVD, HLD, HTN, GERD, hx of malignant neoplasm of floor of mouth, hx of colectomy.    Patient TPN currently running @ 40 mL/hr with 250 mL of 20% lipids daily. Patient remains NPO. Patient Phosphorus elevated. Sodium and potassium WNL. RD placed ordered for redraw of Potassium. Glucose slightly elevated 170 mg/dL. Meds reviewed. Patient last BM noted 10/30; abdominal discomfort noted.     Recommendations:   -Replace Mg, K+ and Phos if < WNL. Recheck lytes and ensure Mg and K+ WNL and Phos >/= 2.0 prior to starting and advancing TPN.     1) Advance TPN to 65 mL/hr x 24 hrs. Run 20% lipids as above. This provides 1,560 mL, 1608 kcal (20 kcal/kg), 234 g dextrose, 78 g AA (.9 g pro/kg), and 250 mL of 20% IV lipids daily. GIR = 1.7 mg/kg/min    2) Recheck lytes 11/1. If acceptable, advance TPN to goal of 90 mL/hr x 24 hrs. Run 20% lipids as above. This provides 2,160 mL, 2,034 kcal (25 kcal/kg), 324 g dextrose, 108 g AA (1.4 g pro/kg), and 250 mL of 20% IV lipids (24% kcal from fat). GIR = 2.37 mg/kg/min    Adriana Whitlock RDN, LD  Clinical Dietitian  Office: [phone]  Weekend pager: [phone]

## 2022-10-31 NOTE — PLAN OF CARE
Patient c/o left upper abdominal discomfort and requested dilaudid.  Was given dilaudid 0.2 mg at 0423 with good results.  No c/o nausea.  Slept on and off.  TPN running at 40/hr and Lipids at 20.8/hr.  Independent in room.

## 2022-10-31 NOTE — PLAN OF CARE
Goal Outcome Evaluation:      Neuro: A&O x4  Cardiac:  Apical pulse irregular  Respiratory: WNL, On 1L O2 when utilizing dilaudid per pt's comfort.  GI/: Abdominal discomfort, PRN dilaudid utilized  Diet/appetite: NPO  Activity: Ind  Pain: Managed per MAR  Skin: Bruised forearms  LDA's:  PICC infusing LR's and TPN     Other: CHG bath needed     Plan: Pending transfer

## 2022-11-01 PROBLEM — K85.90 ACUTE PANCREATITIS: Status: ACTIVE | Noted: 2022-11-01

## 2022-11-01 LAB
ALBUMIN SERPL BCG-MCNC: 3.3 G/DL (ref 3.5–5.2)
ALP SERPL-CCNC: 79 U/L (ref 40–129)
ALT SERPL W P-5'-P-CCNC: 16 U/L (ref 10–50)
ANION GAP SERPL CALCULATED.3IONS-SCNC: 11 MMOL/L (ref 7–15)
AST SERPL W P-5'-P-CCNC: 17 U/L (ref 10–50)
BILIRUB SERPL-MCNC: 0.8 MG/DL
BUN SERPL-MCNC: 12.4 MG/DL (ref 8–23)
CALCIUM SERPL-MCNC: 9.5 MG/DL (ref 8.8–10.2)
CHLORIDE SERPL-SCNC: 103 MMOL/L (ref 98–107)
CREAT SERPL-MCNC: 0.85 MG/DL (ref 0.67–1.17)
DEPRECATED HCO3 PLAS-SCNC: 24 MMOL/L (ref 22–29)
ERYTHROCYTE [DISTWIDTH] IN BLOOD BY AUTOMATED COUNT: 16.3 % (ref 10–15)
GFR SERPL CREATININE-BSD FRML MDRD: >90 ML/MIN/1.73M2
GLUCOSE BLDC GLUCOMTR-MCNC: 175 MG/DL (ref 70–99)
GLUCOSE BLDC GLUCOMTR-MCNC: 180 MG/DL (ref 70–99)
GLUCOSE BLDC GLUCOMTR-MCNC: 181 MG/DL (ref 70–99)
GLUCOSE BLDC GLUCOMTR-MCNC: 192 MG/DL (ref 70–99)
GLUCOSE BLDC GLUCOMTR-MCNC: 196 MG/DL (ref 70–99)
GLUCOSE BLDC GLUCOMTR-MCNC: 245 MG/DL (ref 70–99)
GLUCOSE SERPL-MCNC: 188 MG/DL (ref 70–99)
HCT VFR BLD AUTO: 41 % (ref 40–53)
HGB BLD-MCNC: 13.2 G/DL (ref 13.3–17.7)
MAGNESIUM SERPL-MCNC: 2.2 MG/DL (ref 1.7–2.3)
MCH RBC QN AUTO: 32.2 PG (ref 26.5–33)
MCHC RBC AUTO-ENTMCNC: 32.2 G/DL (ref 31.5–36.5)
MCV RBC AUTO: 100 FL (ref 78–100)
PHOSPHATE SERPL-MCNC: 2.1 MG/DL (ref 2.5–4.5)
PHOSPHATE SERPL-MCNC: 7 MG/DL (ref 2.5–4.5)
PLATELET # BLD AUTO: 173 10E3/UL (ref 150–450)
POTASSIUM SERPL-SCNC: 4 MMOL/L (ref 3.4–5.3)
PREALB SERPL IA-MCNC: 11 MG/DL (ref 15–45)
PROT SERPL-MCNC: 7.2 G/DL (ref 6.4–8.3)
RBC # BLD AUTO: 4.1 10E6/UL (ref 4.4–5.9)
SARS-COV-2 RNA RESP QL NAA+PROBE: NEGATIVE
SODIUM SERPL-SCNC: 138 MMOL/L (ref 136–145)
WBC # BLD AUTO: 12.1 10E3/UL (ref 4–11)

## 2022-11-01 PROCEDURE — 250N000009 HC RX 250: Performed by: INTERNAL MEDICINE

## 2022-11-01 PROCEDURE — 250N000011 HC RX IP 250 OP 636: Performed by: INTERNAL MEDICINE

## 2022-11-01 PROCEDURE — 250N000011 HC RX IP 250 OP 636: Performed by: PHYSICIAN ASSISTANT

## 2022-11-01 PROCEDURE — 250N000012 HC RX MED GY IP 250 OP 636 PS 637: Performed by: INTERNAL MEDICINE

## 2022-11-01 PROCEDURE — 250N000013 HC RX MED GY IP 250 OP 250 PS 637: Performed by: INTERNAL MEDICINE

## 2022-11-01 PROCEDURE — 250N000013 HC RX MED GY IP 250 OP 250 PS 637: Performed by: PHYSICIAN ASSISTANT

## 2022-11-01 PROCEDURE — 99451 NTRPROF PH1/NTRNET/EHR 5/>: CPT | Performed by: INTERNAL MEDICINE

## 2022-11-01 PROCEDURE — 80053 COMPREHEN METABOLIC PANEL: CPT | Performed by: INTERNAL MEDICINE

## 2022-11-01 PROCEDURE — 36415 COLL VENOUS BLD VENIPUNCTURE: CPT | Performed by: INTERNAL MEDICINE

## 2022-11-01 PROCEDURE — 120N000001 HC R&B MED SURG/OB

## 2022-11-01 PROCEDURE — 99232 SBSQ HOSP IP/OBS MODERATE 35: CPT | Performed by: INTERNAL MEDICINE

## 2022-11-01 PROCEDURE — 84100 ASSAY OF PHOSPHORUS: CPT | Performed by: INTERNAL MEDICINE

## 2022-11-01 PROCEDURE — 83735 ASSAY OF MAGNESIUM: CPT | Performed by: INTERNAL MEDICINE

## 2022-11-01 PROCEDURE — C9113 INJ PANTOPRAZOLE SODIUM, VIA: HCPCS | Performed by: PHYSICIAN ASSISTANT

## 2022-11-01 PROCEDURE — 258N000003 HC RX IP 258 OP 636: Performed by: INTERNAL MEDICINE

## 2022-11-01 PROCEDURE — 87635 SARS-COV-2 COVID-19 AMP PRB: CPT | Performed by: INTERNAL MEDICINE

## 2022-11-01 PROCEDURE — 85014 HEMATOCRIT: CPT | Performed by: INTERNAL MEDICINE

## 2022-11-01 RX ADMIN — TAZOBACTAM SODIUM AND PIPERACILLIN SODIUM 3.38 G: 375; 3 INJECTION, SOLUTION INTRAVENOUS at 16:52

## 2022-11-01 RX ADMIN — ASCORBIC ACID, VITAMIN A PALMITATE, CHOLECALCIFEROL, THIAMINE HYDROCHLORIDE, RIBOFLAVIN-5 PHOSPHATE SODIUM, PYRIDOXINE HYDROCHLORIDE, NIACINAMIDE, DEXPANTHENOL, ALPHA-TOCOPHEROL ACETATE, VITAMIN K1, FOLIC ACID, BIOTIN, CYANOCOBALAMIN: 200; 3300; 200; 6; 3.6; 6; 40; 15; 10; 150; 600; 60; 5 INJECTION, SOLUTION INTRAVENOUS at 20:18

## 2022-11-01 RX ADMIN — INSULIN ASPART 2 UNITS: 100 INJECTION, SOLUTION INTRAVENOUS; SUBCUTANEOUS at 15:30

## 2022-11-01 RX ADMIN — HYDROMORPHONE HYDROCHLORIDE 0.2 MG: 0.2 INJECTION, SOLUTION INTRAMUSCULAR; INTRAVENOUS; SUBCUTANEOUS at 12:39

## 2022-11-01 RX ADMIN — PANTOPRAZOLE SODIUM 40 MG: 40 INJECTION, POWDER, FOR SOLUTION INTRAVENOUS at 22:45

## 2022-11-01 RX ADMIN — EMPAGLIFLOZIN 10 MG: 10 TABLET, FILM COATED ORAL at 08:13

## 2022-11-01 RX ADMIN — HYDROMORPHONE HYDROCHLORIDE 0.2 MG: 0.2 INJECTION, SOLUTION INTRAMUSCULAR; INTRAVENOUS; SUBCUTANEOUS at 02:42

## 2022-11-01 RX ADMIN — INSULIN ASPART 5 UNITS: 100 INJECTION, SOLUTION INTRAVENOUS; SUBCUTANEOUS at 08:14

## 2022-11-01 RX ADMIN — INSULIN ASPART 3 UNITS: 100 INJECTION, SOLUTION INTRAVENOUS; SUBCUTANEOUS at 18:55

## 2022-11-01 RX ADMIN — INSULIN GLARGINE 10 UNITS: 100 INJECTION, SOLUTION SUBCUTANEOUS at 08:15

## 2022-11-01 RX ADMIN — METOPROLOL SUCCINATE 75 MG: 25 TABLET, EXTENDED RELEASE ORAL at 20:34

## 2022-11-01 RX ADMIN — TAMSULOSIN HYDROCHLORIDE 0.4 MG: 0.4 CAPSULE ORAL at 08:13

## 2022-11-01 RX ADMIN — HYDROMORPHONE HYDROCHLORIDE 0.2 MG: 0.2 INJECTION, SOLUTION INTRAMUSCULAR; INTRAVENOUS; SUBCUTANEOUS at 08:12

## 2022-11-01 RX ADMIN — INSULIN ASPART 2 UNITS: 100 INJECTION, SOLUTION INTRAVENOUS; SUBCUTANEOUS at 10:23

## 2022-11-01 RX ADMIN — PANTOPRAZOLE SODIUM 40 MG: 40 INJECTION, POWDER, FOR SOLUTION INTRAVENOUS at 12:39

## 2022-11-01 RX ADMIN — TAZOBACTAM SODIUM AND PIPERACILLIN SODIUM 3.38 G: 375; 3 INJECTION, SOLUTION INTRAVENOUS at 10:22

## 2022-11-01 RX ADMIN — SODIUM CHLORIDE, POTASSIUM CHLORIDE, SODIUM LACTATE AND CALCIUM CHLORIDE: 600; 310; 30; 20 INJECTION, SOLUTION INTRAVENOUS at 17:45

## 2022-11-01 RX ADMIN — TAZOBACTAM SODIUM AND PIPERACILLIN SODIUM 3.38 G: 375; 3 INJECTION, SOLUTION INTRAVENOUS at 22:24

## 2022-11-01 RX ADMIN — LISINOPRIL 10 MG: 10 TABLET ORAL at 08:13

## 2022-11-01 RX ADMIN — HYDROMORPHONE HYDROCHLORIDE 0.2 MG: 0.2 INJECTION, SOLUTION INTRAMUSCULAR; INTRAVENOUS; SUBCUTANEOUS at 22:44

## 2022-11-01 RX ADMIN — ATORVASTATIN CALCIUM 80 MG: 80 TABLET, FILM COATED ORAL at 08:13

## 2022-11-01 RX ADMIN — METOPROLOL SUCCINATE 75 MG: 25 TABLET, EXTENDED RELEASE ORAL at 08:13

## 2022-11-01 RX ADMIN — I.V. FAT EMULSION 250 ML: 20 EMULSION INTRAVENOUS at 20:26

## 2022-11-01 RX ADMIN — INSULIN ASPART 2 UNITS: 100 INJECTION, SOLUTION INTRAVENOUS; SUBCUTANEOUS at 23:15

## 2022-11-01 RX ADMIN — TAZOBACTAM SODIUM AND PIPERACILLIN SODIUM 3.38 G: 375; 3 INJECTION, SOLUTION INTRAVENOUS at 03:10

## 2022-11-01 RX ADMIN — HYDROMORPHONE HYDROCHLORIDE 0.2 MG: 0.2 INJECTION, SOLUTION INTRAMUSCULAR; INTRAVENOUS; SUBCUTANEOUS at 15:29

## 2022-11-01 RX ADMIN — PREGABALIN 150 MG: 100 CAPSULE ORAL at 16:53

## 2022-11-01 RX ADMIN — HYDROMORPHONE HYDROCHLORIDE 0.2 MG: 0.2 INJECTION, SOLUTION INTRAMUSCULAR; INTRAVENOUS; SUBCUTANEOUS at 18:55

## 2022-11-01 RX ADMIN — INSULIN ASPART 3 UNITS: 100 INJECTION, SOLUTION INTRAVENOUS; SUBCUTANEOUS at 03:09

## 2022-11-01 RX ADMIN — SODIUM CHLORIDE, POTASSIUM CHLORIDE, SODIUM LACTATE AND CALCIUM CHLORIDE: 600; 310; 30; 20 INJECTION, SOLUTION INTRAVENOUS at 08:04

## 2022-11-01 ASSESSMENT — ACTIVITIES OF DAILY LIVING (ADL)
ADLS_ACUITY_SCORE: 26

## 2022-11-01 NOTE — PROGRESS NOTES
"Pt. A & O x 4, pain 6-7/10 located in abdominal area, some cramping and gas pain, able to make needs known, independent and has been ambulating in the mosher, did state that he has had a smear of tar consistency BM, sent message to provider on lab value phos 2.1, pain has been managed with 0.2 dilaudid, no nausea, NPO with exception of oral medications, BG every 4 hours TPN running 65 ml/h, LR running 35 ml/h, /71 (BP Location: Left arm)   Pulse 79   Temp 97.8  F (36.6  C) (Oral)   Resp 16   Ht 1.778 m (5' 10\")   Wt 95 kg (209 lb 7 oz)   SpO2 90%   BMI 30.05 kg/m    "

## 2022-11-01 NOTE — CONSULTS
Brief consult note.  Question of EUS:  Pt active alcoholic, post distal pancreatectomy for IPMN, recurrent acute on chronic pancreatitis.  Following CT:copied below, reviewed by me.  IMP: This collection is inflammatory related to acute peripancreatic collection off tail of pancreas. EUS would not be helpful at this point. Rec  1) PETH test to see degree of ETOH consumption  2) Follow w CT in one month - if still symptomatic and has walled off collection, could consider EUS transluminal drainage and perhaps ERCP to assess for pancreatic duct leak, however mostly resolve spontaneously        CT IMPRESSION:   1.  Diffuse gastric wall thickening has increased since the previous  exam and there is tubular new fluid density along the lesser curvature  of the stomach. Differential would include deep ulcer or abscess,  although the mucosa appears intact overlying this. This has developed  since 9/13/2022. Given the fluid and inflammation surrounding the  stomach and the fact that it abuts the resection margin of the  pancreas, pancreatitis predominantly involving inflammation of the  stomach cannot be excluded, but is considered unlikely. Endoscopic  ultrasound may be useful for further evaluation.  2.  Multiple cysts are again seen throughout both kidneys. One  hemorrhagic cyst versus solid mass arising from the lateral right  kidney is unchanged. MRI could further evaluate if indicated.  3.  Splenectomy and distal pancreatectomy. No evidence for  inflammation of the residual pancreas.     JENNIFER MODI MD

## 2022-11-01 NOTE — PROGRESS NOTES
Patient is alert and oriented X4. He is waiting transport to the , possibly tomorrow. He has been on room air with sats at 94%. He is NPO and has lipids and TPN running as well as LR running at 35 mL/hr. Patient has been having left upper quadrant pain that has been managed with PRN medications. He has a PICC placed in his right upper arm. He is able to make his needs know. He is independent and has been walking the halls this evening.

## 2022-11-01 NOTE — PROGRESS NOTES
Nutrition Note Brief    Patient remains NPO; TPN currently running at 65 mL/hr with daily lipids. Meds/labs reviewed. Patient glucose elevated @ 245 mg/dL recommend adjustment of insulin regiment if elevated BG persists. Need updated Potassium draw for patient to assess tolerance of advancement. RD placed lab orders. Magnesium WNL and Phosphorus elevated at 7.0 mg/dL.  Per RN notes patient may transfer to [facility] today.     Recommendations    Replace Mg, K+ and Phos if < WNL. Recheck lytes and ensure Mg and K+ WNL and Phos >/= 2.0 prior to starting and advancing TPN.     1) Continue TPN 11/1 @ 65 mL/hr x 24 hrs. Run 20% lipids as above. This provides 1,560 mL, 1608 kcal (20 kcal/kg), 234 g dextrose, 78 g AA (.9 g pro/kg), and 250 mL of 20% IV lipids daily. GIR = 1.7 mg/kg/min.    2) Recheck lytes 11/2. If acceptable, advance TPN to goal of 90 mL/hr x 24 hrs. Run 20% lipids as above. This provides 2,160 mL, 2,034 kcal (25 kcal/kg), 324 g dextrose, 108 g AA (1.4 g pro/kg), and 250 mL of 20% IV lipids (24% kcal from fat). GIR = 2.37 mg/kg/min     Adriana Whitlock RDN, GERALDINE  Clinical Dietitian  Office: [phone]  Weekend pager: [phone]

## 2022-11-01 NOTE — PROGRESS NOTES
"Pt alert, oriented, independent. NPO with TPN and lipids infusing. IV antibiotics given. Pt reports LUQ pain, prn dilaudid given, effective PICC In place and infusing. Awaiting transport to  possibly today. BP (!) 155/82   Pulse 85   Temp 97.6  F (36.4  C) (Oral)   Resp 18   Ht 1.778 m (5' 10\")   Wt 95 kg (209 lb 7 oz)   SpO2 93%   BMI 30.05 kg/m      "

## 2022-11-01 NOTE — PROGRESS NOTES
"[facility]    Medicine Progress Note - Hospitalist Service    Date of Admission:  10/25/2022    Assessment & Plan   Antoine Russo is a 76 year old male admitted on 10/25/2022. He presented to the emergency department for evaluation of epigastric pain and was found to have evidence of gastritis on imaging and concern for possible ulcer for which he is being admitted for further evaluation and treatment.     Probable recurrent pancreatitis  History of Pancreatic pseudocyst  Acute gastritis without hemorrhage, initial imaging concern for perforated peptic ulcer diseasePatient with known history of a distal pancreatectomy for IPMN of the tail in the setting of chronic pancreatitis.  He has history of necrotizing pancreatitis, pancreatic duct leak, and a history of a stent in 2018, since removed. Patient does admit to near daily drinking.     Presented with epigastric pain, CT abdomen & pelvis demonstrates \"Diffuse gastric wall thickening has increased since the previous exam and there is tubular new fluid density along the lesser curvature of the stomach. Differential would include deep ulcer or abscess, although the mucosa appears intact overlying this. This has developed since 9/13/2022. Given the fluid and inflammation surrounding the stomach and the fact that it abuts the resection margin of the pancreas, pancreatitis predominantly involving inflammation of the stomach cannot be excluded, but is considered unlikely. Endoscopic ultrasound may be useful for further evaluation...\"     Admit hemoglobin normal (15.6), no evidence of bleeding. Admit lipase 200. Patient's personal risk factors for gastritis include near daily alcohol use, new clopidogrel use, and coumadin use. Case discussed between emergency department and general surgery    EGD on 10/26 demonstrated diffusely erythematous gastric mucosa, with bleeding in gastric antrum with pus.  Pt given FFP, tranexamic acid, and vitamin " "K post- EGD. Surgeon concerned for perforation and recommending transfer for advanced endoscopy  Discussed with GI at [facility], agree with need for advanced endoscopy and possible EUS. Started Zosyn on 10/26      Attempted to reach Endoscopy at [facility] to discuss other options, but no call back from page 10/31/2022. No beds available at [facility].     Patient made NPO, Placed PICC line on 10/29 and started TPN. Hemoglobin decreased from 15.6 on 10/25 to 13.2     Discussed with Dr. Beasley 11/1/2022 who knows patient.   \"This collection is inflammatory related to acute peripancreatic collection off tail of pancreas. EUS would not be helpful at this point. Recommend:  1) PETH test to see degree of ETOH consumption  2) Follow w CT in one month - if still symptomatic and has walled off collection, could consider EUS transluminal drainage and perhaps ERCP to assess for pancreatic duct leak, however mostly resolve spontaneously\"    HGB stable. Pain better  - Zosyn on 10/26 (Day 6), check PCT, probable stop tomorrow  - IV Protonix 40 mg q12h  - Continue TPN  - Phos has been up and down, discontinued RN directed replacement  - LR for total 100 ml/hr  - Start clear liquids  - PETH ordered  - Antiemetics and analgesia noted above  - Consider CT In 1 month if remains symptomatic to r/o development of psuedocyst     Paroxysmal atrial fibrillation   Long term current use of anticoagulant therapy / Drug Induced Coagulation Defect  Anticoagulated prior to admission with coumadin, admit INR 1.81.   INR of 1.81 in ED reversed prior to EGD, given vitamin K -> 1.31    Last EKG NSR 5/2022  INR remains slightly elevated at 1.3  - Continue metoprolol   - INR reversed prior to EGD  - Continue to hold coumadin in anticipation of EGD procedure at [facility]     Alcohol abuse  Patient admits to near daily alcohol use, \"a few beers a day.\" Last drink on 10/22. No evidence of withdrawal at time of admission.  - No CIWA indicated, but monitor for evidence of " "withdrawal     Type 2 diabetes mellitus with diabetic polyneuropathy, without long-term current use of insulin (H)  HgbA1c 8.2. Managed prior to admission with Jardiance 10 mg daily and Novolin NPH 40 U q am / 32 U q pm. Patient has not been using his insulin recently due to poor oral intake.     BS mildly elevated  - BS q 4 while on TPN and NPO  - Hold Novolin NPH while NPO  - Held Jardiance on admission, resumed 10/30  - High sliding scale insulin (NPO regimen)  - Started Lantus 10 units qAM on 10/30  - Add 5 units regular insulin to TPN, next bag starts 8 PM        Leukocytosis with left shift  Spleen absent  Admit WBC 16.0, ANC 13.9. 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 Unclear if he is up to date on immunizations.     Afebrile. WBC up to 12  - Monitor    Renal cysts  Incidental finding on CT abdomen & pelvis \"2.  Multiple cysts are again seen throughout both kidneys. One hemorrhagic cyst versus solid mass arising from the lateral right kidney is unchanged. MRI could further evaluate if indicated.\"   - Consider renal MRI as outpatient     CAD (coronary artery disease)  Peripheral vascular disease / SFA disease  Hyperlipidemia LDL goal <100  Drug Induced Platelet Defect  Known PVD, follows with vascular Dr. Stacy. Recent left lower extremity angiogram on 9/30/22. Known CAD on prior stress tests, but has never had PCI or CABG interventions. Managed prior to admission with atorvastatin 80 mg daily and clopidogrel 75 mg daily.   - Continue atorvastatin 80  - Clopidogrel on hold, restart      Hypertension, benign essential, goal below 140/90  Managed prior to admission with lisinopril 10 mg daily and metoprolol XL 75 mg bid.     Blood pressure intermittently elevated.   - Continue lisinopril and metoprolol with holding parameters     Hypertrophy of prostate without urinary obstruction  Managed prior to admission with Flomax 0.4 mg daily, continue.      GERD (gastroesophageal reflux " "disease)  Noted on problem list, not on PPI or H2 blocker prior to admission.  - Start PPI as above     Peripheral polyneuropathy  Managed prior to admission with Lyrica 150 mg q pm, continue.      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 Not acute. Continue with outpatient management.           Diet: NPO for Medical/Clinical Reasons Except for: Meds  parenteral nutrition - Clinimix E  parenteral nutrition - Clinimix E    DVT Prophylaxis: Low Risk/Ambulatory with no VTE prophylaxis indicated  Chavez Catheter: Not present  Central Lines: PRESENT  PICC 10/29/22 Double Lumen Right Basilic TPN-Site Assessment: WDL  Cardiac Monitoring: None  Code Status: Full Code      Disposition Plan      Expected Discharge Date: 11/03/2022      Destination: home          The patient's care was discussed with the Patient.    Ab Andrade MD  Hospitalist Service  [facility]  Securely message with the Vocera Web Console (learn more here)  Text page via [employer] Paging/Directory         Clinically Significant Risk Factors           # Hypercalcemia: corrected calcium is >10.1, will monitor as appropriate    # Hypoalbuminemia: Lowest albumin = 2.9 g/dL (Ref range: 3.5-5.2) at 10/29/2022  6:06 PM, will monitor as appropriate         # DMII: A1C = 8.2 % (Ref range: 0.0 - 5.6 %) within past 3 months   # Obesity: Estimated body mass index is 30.05 kg/m  as calculated from the following:    Height as of this encounter: 1.778 m (5' 10\").    Weight as of this encounter: 95 kg (209 lb 7 oz).          ______________________________________________________________________    Interval History   No events    Data reviewed today: I reviewed all medications, new labs and imaging results over the last 24 hours. I personally reviewed no images or EKG's today.    Physical Exam   Vital " Signs: Temp: 97.8  F (36.6  C) Temp src: Oral BP: 121/71 Pulse: 79   Resp: 16 SpO2: 90 % O2 Device: None (Room air)    Weight: 209 lbs 6.99 oz  Constitutional: awake, alert, cooperative, no apparent distress, and appears stated age    Data      Recent Labs   Lab 11/01/22  1412 11/01/22  1329 11/01/22  1021 11/01/22  0641 11/01/22  0227 10/31/22  2130   * 188* 175* 245* 196* 166*         CMPRecent Labs   Lab 11/01/22  1412 11/01/22  1329 11/01/22  1021 11/01/22  0641 11/01/22  0553 10/31/22  1704 10/31/22  1533 10/31/22  0955 10/31/22  0805 10/31/22  0636 10/30/22  0609 10/30/22  0602 10/29/22  1818 10/29/22  1806 10/28/22  0614 10/28/22  0602 10/27/22  0806 10/27/22  0530   NA  --  138  --   --   --   --   --   --  137  --   --   --   --  138  --  136  --  137   POTASSIUM  --  4.0  --   --   --   --   --   --  3.7  --   --   --   --  3.8  --  4.1  --  4.2   CHLORIDE  --  103  --   --   --   --   --   --  101  --   --   --   --  99  --  103  --  102   CO2  --  24  --   --   --   --   --   --  27  --   --   --   --  24  --  23  --  22   ANIONGAP  --  11  --   --   --   --   --   --  9  --   --   --   --  15  --  10  --  13   * 188* 175* 245*  --    < >  --    < > 170*  --    < >  --    < > 83   < > 90   < > 128*   BUN  --  12.4  --   --   --   --   --   --  8.2  --   --   --   --  9.4  --  15.5  --  19.3   CR  --  0.85  --   --   --   --   --   --  0.86  --   --   --   --  0.81  --  0.83  --  0.83   GFRESTIMATED  --  >90  --   --   --   --   --   --  >90  --   --   --   --  >90  --  >90  --  >90   NILSON  --  9.5  --   --   --   --   --   --  9.0  --   --   --   --  8.9  --  8.8  --  9.0   MAG  --   --   --   --  2.2  --   --   --   --  2.0  --  1.7  --  1.5*  --   --   --   --    PHOS  --  2.1*  --   --  7.0*  --  2.4*  --   --  6.0*   < > 1.9*  --  1.8*   < >  --   --   --    PROTTOTAL  --  7.2  --   --   --   --   --   --  6.6  --   --   --   --  6.2*  --   --   --  6.1*   ALBUMIN  --  3.3*  --   --   --    --   --   --  3.1*  --   --   --   --  2.9*  --   --   --  2.9*   BILITOTAL  --  0.8  --   --   --   --   --   --  0.6  --   --   --   --  0.8  --   --   --  1.1   ALKPHOS  --  79  --   --   --   --   --   --  77  --   --   --   --  74  --   --   --  77   AST  --  17  --   --   --   --   --   --  22  --   --   --   --  26  --   --   --  25   ALT  --  16  --   --   --   --   --   --  17  --   --   --   --  17  --   --   --  18    < > = values in this interval not displayed.     CBC  Recent Labs   Lab 11/01/22  0838 10/30/22  0602 10/29/22  2345 10/29/22  1409 10/28/22  1425 10/28/22  0602 10/27/22  1405 10/27/22  0530 10/26/22  1355 10/26/22  0524   WBC 12.1*  --   --   --   --  7.0  --  8.6  --  14.5*   RBC 4.10*  --   --   --   --  3.98*  --  4.09*  --  4.69   HGB 13.2* 13.4 13.0* 13.0*   < > 12.8*  12.8*   < > 13.2*  13.2*   < > 15.3   HCT 41.0  --   --   --   --  40.8  --  41.1  --  48.7     --   --   --   --  103*  --  101*  --  104*   MCH 32.2  --   --   --   --  32.2  --  32.3  --  32.6   MCHC 32.2  --   --   --   --  31.4*  --  32.1  --  31.4*   RDW 16.3*  --   --   --   --  16.6*  --  16.7*  --  17.3*     --   --   --   --  145*  --  156  --  147*    < > = values in this interval not displayed.     INR  Recent Labs   Lab 10/31/22  0636 10/30/22  0602 10/28/22  0602 10/26/22  2206   INR 1.30* 1.34* 1.26* 1.31*

## 2022-11-02 LAB
ALBUMIN SERPL BCG-MCNC: 3 G/DL (ref 3.5–5.2)
ALP SERPL-CCNC: 73 U/L (ref 40–129)
ALT SERPL W P-5'-P-CCNC: 13 U/L (ref 10–50)
ANION GAP SERPL CALCULATED.3IONS-SCNC: 10 MMOL/L (ref 7–15)
AST SERPL W P-5'-P-CCNC: 14 U/L (ref 10–50)
BILIRUB SERPL-MCNC: 0.6 MG/DL
BUN SERPL-MCNC: 13.4 MG/DL (ref 8–23)
CA-I BLD-MCNC: 5 MG/DL (ref 4.4–5.2)
CALCIUM SERPL-MCNC: 9.2 MG/DL (ref 8.8–10.2)
CHLORIDE SERPL-SCNC: 103 MMOL/L (ref 98–107)
CREAT SERPL-MCNC: 0.78 MG/DL (ref 0.67–1.17)
DEPRECATED HCO3 PLAS-SCNC: 23 MMOL/L (ref 22–29)
ERYTHROCYTE [DISTWIDTH] IN BLOOD BY AUTOMATED COUNT: 16.3 % (ref 10–15)
GFR SERPL CREATININE-BSD FRML MDRD: >90 ML/MIN/1.73M2
GLUCOSE BLDC GLUCOMTR-MCNC: 201 MG/DL (ref 70–99)
GLUCOSE BLDC GLUCOMTR-MCNC: 224 MG/DL (ref 70–99)
GLUCOSE BLDC GLUCOMTR-MCNC: 224 MG/DL (ref 70–99)
GLUCOSE BLDC GLUCOMTR-MCNC: 225 MG/DL (ref 70–99)
GLUCOSE BLDC GLUCOMTR-MCNC: 233 MG/DL (ref 70–99)
GLUCOSE SERPL-MCNC: 253 MG/DL (ref 70–99)
HCT VFR BLD AUTO: 41.3 % (ref 40–53)
HGB BLD-MCNC: 13.2 G/DL (ref 13.3–17.7)
MAGNESIUM SERPL-MCNC: 1.9 MG/DL (ref 1.7–2.3)
MCH RBC QN AUTO: 32.4 PG (ref 26.5–33)
MCHC RBC AUTO-ENTMCNC: 32 G/DL (ref 31.5–36.5)
MCV RBC AUTO: 102 FL (ref 78–100)
PHOSPHATE SERPL-MCNC: 2.3 MG/DL (ref 2.5–4.5)
PLATELET # BLD AUTO: 169 10E3/UL (ref 150–450)
POTASSIUM SERPL-SCNC: 4 MMOL/L (ref 3.4–5.3)
PROT SERPL-MCNC: 6.5 G/DL (ref 6.4–8.3)
RBC # BLD AUTO: 4.07 10E6/UL (ref 4.4–5.9)
SODIUM SERPL-SCNC: 136 MMOL/L (ref 136–145)
WBC # BLD AUTO: 11.5 10E3/UL (ref 4–11)

## 2022-11-02 PROCEDURE — 250N000013 HC RX MED GY IP 250 OP 250 PS 637: Performed by: PHYSICIAN ASSISTANT

## 2022-11-02 PROCEDURE — C9113 INJ PANTOPRAZOLE SODIUM, VIA: HCPCS | Performed by: PHYSICIAN ASSISTANT

## 2022-11-02 PROCEDURE — 83735 ASSAY OF MAGNESIUM: CPT | Performed by: INTERNAL MEDICINE

## 2022-11-02 PROCEDURE — 80053 COMPREHEN METABOLIC PANEL: CPT | Performed by: INTERNAL MEDICINE

## 2022-11-02 PROCEDURE — 85027 COMPLETE CBC AUTOMATED: CPT | Performed by: INTERNAL MEDICINE

## 2022-11-02 PROCEDURE — 250N000009 HC RX 250: Performed by: INTERNAL MEDICINE

## 2022-11-02 PROCEDURE — 36415 COLL VENOUS BLD VENIPUNCTURE: CPT | Performed by: INTERNAL MEDICINE

## 2022-11-02 PROCEDURE — 120N000001 HC R&B MED SURG/OB

## 2022-11-02 PROCEDURE — 250N000012 HC RX MED GY IP 250 OP 636 PS 637: Performed by: INTERNAL MEDICINE

## 2022-11-02 PROCEDURE — 80321 ALCOHOLS BIOMARKERS 1OR 2: CPT | Performed by: INTERNAL MEDICINE

## 2022-11-02 PROCEDURE — 250N000011 HC RX IP 250 OP 636: Performed by: PHYSICIAN ASSISTANT

## 2022-11-02 PROCEDURE — 82330 ASSAY OF CALCIUM: CPT | Performed by: INTERNAL MEDICINE

## 2022-11-02 PROCEDURE — 250N000011 HC RX IP 250 OP 636: Performed by: INTERNAL MEDICINE

## 2022-11-02 PROCEDURE — 99232 SBSQ HOSP IP/OBS MODERATE 35: CPT | Performed by: PHYSICIAN ASSISTANT

## 2022-11-02 PROCEDURE — 84100 ASSAY OF PHOSPHORUS: CPT | Performed by: INTERNAL MEDICINE

## 2022-11-02 PROCEDURE — 250N000013 HC RX MED GY IP 250 OP 250 PS 637: Performed by: INTERNAL MEDICINE

## 2022-11-02 RX ADMIN — EMPAGLIFLOZIN 10 MG: 10 TABLET, FILM COATED ORAL at 08:57

## 2022-11-02 RX ADMIN — TAMSULOSIN HYDROCHLORIDE 0.4 MG: 0.4 CAPSULE ORAL at 08:52

## 2022-11-02 RX ADMIN — HYDROMORPHONE HYDROCHLORIDE 0.2 MG: 0.2 INJECTION, SOLUTION INTRAMUSCULAR; INTRAVENOUS; SUBCUTANEOUS at 02:43

## 2022-11-02 RX ADMIN — INSULIN GLARGINE 10 UNITS: 100 INJECTION, SOLUTION SUBCUTANEOUS at 06:23

## 2022-11-02 RX ADMIN — PANTOPRAZOLE SODIUM 40 MG: 40 INJECTION, POWDER, FOR SOLUTION INTRAVENOUS at 11:26

## 2022-11-02 RX ADMIN — METOPROLOL SUCCINATE 75 MG: 25 TABLET, EXTENDED RELEASE ORAL at 08:52

## 2022-11-02 RX ADMIN — I.V. FAT EMULSION 250 ML: 20 EMULSION INTRAVENOUS at 21:03

## 2022-11-02 RX ADMIN — PANTOPRAZOLE SODIUM 40 MG: 40 INJECTION, POWDER, FOR SOLUTION INTRAVENOUS at 23:33

## 2022-11-02 RX ADMIN — ATORVASTATIN CALCIUM 80 MG: 80 TABLET, FILM COATED ORAL at 08:52

## 2022-11-02 RX ADMIN — PREGABALIN 150 MG: 100 CAPSULE ORAL at 16:59

## 2022-11-02 RX ADMIN — INSULIN ASPART 4 UNITS: 100 INJECTION, SOLUTION INTRAVENOUS; SUBCUTANEOUS at 06:22

## 2022-11-02 RX ADMIN — METOPROLOL SUCCINATE 75 MG: 25 TABLET, EXTENDED RELEASE ORAL at 20:42

## 2022-11-02 RX ADMIN — TAZOBACTAM SODIUM AND PIPERACILLIN SODIUM 3.38 G: 375; 3 INJECTION, SOLUTION INTRAVENOUS at 16:10

## 2022-11-02 RX ADMIN — TAZOBACTAM SODIUM AND PIPERACILLIN SODIUM 3.38 G: 375; 3 INJECTION, SOLUTION INTRAVENOUS at 22:12

## 2022-11-02 RX ADMIN — TAZOBACTAM SODIUM AND PIPERACILLIN SODIUM 3.38 G: 375; 3 INJECTION, SOLUTION INTRAVENOUS at 10:57

## 2022-11-02 RX ADMIN — INSULIN ASPART 4 UNITS: 100 INJECTION, SOLUTION INTRAVENOUS; SUBCUTANEOUS at 02:39

## 2022-11-02 RX ADMIN — ASCORBIC ACID, VITAMIN A PALMITATE, CHOLECALCIFEROL, THIAMINE HYDROCHLORIDE, RIBOFLAVIN-5 PHOSPHATE SODIUM, PYRIDOXINE HYDROCHLORIDE, NIACINAMIDE, DEXPANTHENOL, ALPHA-TOCOPHEROL ACETATE, VITAMIN K1, FOLIC ACID, BIOTIN, CYANOCOBALAMIN: 200; 3300; 200; 6; 3.6; 6; 40; 15; 10; 150; 600; 60; 5 INJECTION, SOLUTION INTRAVENOUS at 20:42

## 2022-11-02 RX ADMIN — LISINOPRIL 10 MG: 10 TABLET ORAL at 08:51

## 2022-11-02 RX ADMIN — INSULIN ASPART 4 UNITS: 100 INJECTION, SOLUTION INTRAVENOUS; SUBCUTANEOUS at 22:21

## 2022-11-02 RX ADMIN — INSULIN ASPART 3 UNITS: 100 INJECTION, SOLUTION INTRAVENOUS; SUBCUTANEOUS at 16:59

## 2022-11-02 RX ADMIN — CLOPIDOGREL BISULFATE 75 MG: 75 TABLET ORAL at 08:52

## 2022-11-02 RX ADMIN — INSULIN ASPART 4 UNITS: 100 INJECTION, SOLUTION INTRAVENOUS; SUBCUTANEOUS at 12:28

## 2022-11-02 RX ADMIN — TAZOBACTAM SODIUM AND PIPERACILLIN SODIUM 3.38 G: 375; 3 INJECTION, SOLUTION INTRAVENOUS at 03:48

## 2022-11-02 ASSESSMENT — ACTIVITIES OF DAILY LIVING (ADL)
ADLS_ACUITY_SCORE: 26

## 2022-11-02 NOTE — PROGRESS NOTES
Alert, oriented.  Ambulating independently.  Denies pain/nausea this morning after tolerating 550ml clear liquid diet.  Reports flatus and loose stool today.  TPN, IVF and antibiotics infusing as ordered.

## 2022-11-02 NOTE — PROGRESS NOTES
"Pt alert, oriented, intermittent confusion. Prn dilaudid given for RUQ abdominal pain. Pt tolerating clear liquid diet. Denies nausea. TPN, lipids and LR infusing. IV antibiotics given. PICC in place and patent. Q4 hours blood glucose monitoring. LS clear. BP (!) 149/72 (BP Location: Left arm)   Pulse 66   Temp 98.1  F (36.7  C) (Oral)   Resp 16   Ht 1.778 m (5' 10\")   Wt 95 kg (209 lb 7 oz)   SpO2 91%   BMI 30.05 kg/m      "

## 2022-11-02 NOTE — PROGRESS NOTES
"[facility]    Medicine Progress Note - Hospitalist Service    Date of Admission:  10/25/2022    Assessment & Plan   Antoine Russo is a 76 year old male admitted on 10/25/2022. He presented to the emergency department for evaluation of epigastric pain and was found to have evidence of gastritis on imaging and concern for possible ulcer for which he is being admitted for further evaluation and treatment.     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  He has history of necrotizing pancreatitis, pancreatic duct leak, and a history of a stent in 2018, since removed. Patient does admit to near daily drinking.     Presented with epigastric pain, CT abdomen & pelvis demonstrates \"Diffuse gastric wall thickening has increased since the previous exam and there is tubular new fluid density along the lesser curvature of the stomach. Differential would include deep ulcer or abscess, although the mucosa appears intact overlying this. This has developed since 9/13/2022. Given the fluid and inflammation surrounding the stomach and the fact that it abuts the resection margin of the pancreas, pancreatitis predominantly involving inflammation of the stomach cannot be excluded, but is considered unlikely. Endoscopic ultrasound may be useful for further evaluation...\"     Admit hemoglobin normal (15.6), no evidence of bleeding. Admit lipase 200. Patient's personal risk factors for gastritis include near daily alcohol use, new clopidogrel use, and coumadin use.     EGD on 10/26 demonstrated diffusely erythematous gastric mucosa, with bleeding in gastric antrum with pus. Pt given FFP, tranexamic acid, and vitamin K post-EGD. Surgeon concerned for perforation and " "recommending transfer for advanced endoscopy. Attending physician discussed with GI at [facility] 10/26/22, agreed with need for advanced endoscopy and possible EUS. Started Zosyn on 10/26. PICC placed on 10/29/22 and started TPN.     Waited for transfer for 1 week, no beds available. Attending physician discussed again with GI (Dr. Beasley who knows patient) on 11/1/22:  \"This collection is inflammatory related to acute peripancreatic collection off tail of pancreas. EUS would not be helpful at this point. Recommend:  1) PETH test to see degree of ETOH consumption  2) Follow w CT in one month - if still symptomatic and has walled off collection, could consider EUS transluminal drainage and perhaps ERCP to assess for pancreatic duct leak, however mostly resolve spontaneously\"  Diet advancing and tolerating ~50% of tray without issues.  - Zosyn on 10/26 (Day 7), check procalcitonin in AM to help guide discontinuation  - IV Protonix 40 mg q12h  - Continue TPN and IVF, if tolerating adequate diet, likely stop 11/3/22  - advance diet to regular as tolerated evening of 11/2/22  - Phos has been up and down, discontinued RN directed replacement  - PETH pending  - Consider CT In 1 month if remains symptomatic to r/o development of psuedocyst  - follow up with general surgery as outpatient     Paroxysmal atrial fibrillation   Long term current use of anticoagulant therapy / Drug Induced Coagulation Defect  Anticoagulated prior to admission with coumadin, admit INR 1.81, reversed in the ED with vitamin K.  - Continue metoprolol   - resume home warfarin 11/2/22     Type 2 diabetes mellitus with diabetic polyneuropathy, without long-term current use of insulin (H)  HgbA1c 8.2. Managed prior to admission with Jardiance 10 mg daily and Novolin NPH 40 U q am / 32 U q pm. Patient has not been using his insulin recently due to poor oral intake.   Blood sugars increasing, likely related to increased diet. Started on Lantus 10 units on " "10/30, regular insulin added to TPN.  - Hold Novolin NPH until intake improving  - started Lantus 10 units on 10/30  - 5 units of regular insulin added to TPN  - resumed Jardiance on 10/30  - BS with meals and nightly   - High sliding scale insulin       Leukocytosis with left shift  Spleen absent  Admit WBC 16.0, ANC 13.9. 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 Unclear if he is up to date on immunizations.   Afebrile. WBC has been variable, currently 11.5 .  - Monitor    Renal cysts  Incidental finding on CT abdomen & pelvis \"2.  Multiple cysts are again seen throughout both kidneys. One hemorrhagic cyst versus solid mass arising from the lateral right kidney is unchanged. MRI could further evaluate if indicated.\"   - Consider renal MRI as outpatient     Coronary artery disease  Peripheral vascular disease / SFA disease  Hyperlipidemia LDL goal <100  Drug Induced Platelet Defect  Known PVD, follows with vascular Dr. Stacy. Recent left lower extremity angiogram on 9/30/22. Known CAD on prior stress tests, but has never had PCI or CABG interventions. Managed prior to admission with atorvastatin 80 mg daily and clopidogrel 75 mg daily.   - Continue atorvastatin 80  - Resume home Clopidogrel     Hypertension, benign essential, goal below 140/90  Managed prior to admission with lisinopril 10 mg daily and metoprolol XL 75 mg bid. Blood pressure intermittently elevated.   - Continue lisinopril and metoprolol with holding parameters     Alcohol abuse  Patient admits to near daily alcohol use, \"a few beers a day.\" Last drink on 10/22. No evidence of withdrawal at time of admission.  - No CIWA indicated, but monitor for evidence of withdrawal    Hypertrophy of prostate without urinary obstruction  - Managed prior to admission with Flomax 0.4 mg daily, continue.      GERD (gastroesophageal reflux disease)  Noted on problem list, not on PPI or H2 blocker prior to admission.  - Start PPI as " "above     Peripheral polyneuropathy  - Managed prior to admission with Lyrica 150 mg q pm, continue.      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 Not acute. Continue with outpatient management.       Diet: parenteral nutrition - Clinimix E  parenteral nutrition - Clinimix E  Moderate Consistent Carb (60 g CHO per Meal) Diet    DVT Prophylaxis: resume Warfarin  Chavez Catheter: Not present  Central Lines: PRESENT  PICC 10/29/22 Double Lumen Right Basilic TPN-Site Assessment: WDL except  Cardiac Monitoring: None  Code Status: Full Code      Disposition Plan      Expected Discharge Date: 11/03/2022      Destination: home          The patient's care was discussed with the Attending Physician, Dr. Ab Andrade, Bedside Nurse and Patient.    Citlalli Horton PA-C  Hospitalist Service  [facility]  Securely message with the Vocera Web Console (learn more here)  Text page via [employer] Paging/Directory     Clinically Significant Risk Factors           # Hypercalcemia: corrected calcium is >10.1, will monitor as appropriate    # Hypoalbuminemia: Lowest albumin = 2.9 g/dL (Ref range: 3.5-5.2) at 10/29/2022  6:06 PM, will monitor as appropriate         # DMII: A1C = 8.2 % (Ref range: 0.0 - 5.6 %) within past 3 months   # Overweight: Estimated body mass index is 28.63 kg/m  as calculated from the following:    Height as of this encounter: 1.778 m (5' 10\").    Weight as of this encounter: 90.5 kg (199 lb 8.3 oz).        ______________________________________________________________________    Interval History   Patient is feeling well today. He tolerated about half of a full liquid diet this afternoon without any N/V/bloating or abdominal discomfort. He is hopeful to try a regular diet.    He denies fever, chills, lightheadedness, dizziness, cough, congestion, " rhinorrhea, sore throat, shortness of breath, palpitations, chest pain, abdominal pain, nausea, vomiting, diarrhea, changes in urination.    Data reviewed today: I reviewed all medications, new labs and imaging results over the last 24 hours. I personally reviewed no images or EKG's today.    Physical Exam   Vital Signs: Temp: 98.7  F (37.1  C) Temp src: Oral BP: 136/74 Pulse: 72   Resp: 16 SpO2: 93 % O2 Device: None (Room air)    Weight: 199 lbs 8.26 oz  Constitutional: sitting up comfortably in bed, well appearing without distress, awake, alert, cooperative, and appears stated age  Respiratory: No increased work of breathing, good air exchange, clear to auscultation bilaterally, no crackles or wheezing  Cardiovascular: regular rate and rhythm. No murmur. No lower extremity edema bilaterally.  GI: bowel sounds present, soft, non-tender, non-distended  Skin: warm and dry  Musculoskeletal: normal bulk and tone.  Neurologic: awake, alert, oriented.  Neuropsychiatric: calm, pleasant, cooperative. Appropriate thought process/content. Short term memory is grossly intact.    Data   Recent Labs   Lab 11/02/22  1646 11/02/22  1145 11/02/22  0602 11/02/22  0539 11/01/22  1412 11/01/22  1329 11/01/22  1021 11/01/22  0838 10/31/22  0955 10/31/22  0805 10/31/22  0636 10/30/22  0609 10/30/22  0602 10/28/22  0614 10/28/22  0602 10/27/22  0806 10/27/22  0530   WBC  --   --   --  11.5*  --   --   --  12.1*  --   --   --   --   --   --  7.0  --  8.6   HGB  --   --   --  13.2*  --   --   --  13.2*  --   --   --   --  13.4   < > 12.8*  12.8*   < > 13.2*  13.2*   MCV  --   --   --  102*  --   --   --  100  --   --   --   --   --   --  103*  --  101*   PLT  --   --   --  169  --   --   --  173  --   --   --   --   --   --  145*  --  156   INR  --   --   --   --   --   --   --   --   --   --  1.30*  --  1.34*  --  1.26*  --   --    NA  --   --   --  136  --  138  --   --   --  137  --   --   --    < > 136  --  137   POTASSIUM  --   --    --  4.0  --  4.0  --   --   --  3.7  --   --   --    < > 4.1  --  4.2   CHLORIDE  --   --   --  103  --  103  --   --   --  101  --   --   --    < > 103  --  102   CO2  --   --   --  23  --  24  --   --   --  27  --   --   --    < > 23  --  22   BUN  --   --   --  13.4  --  12.4  --   --   --  8.2  --   --   --    < > 15.5  --  19.3   CR  --   --   --  0.78  --  0.85  --   --   --  0.86  --   --   --    < > 0.83  --  0.83   ANIONGAP  --   --   --  10  --  11  --   --   --  9  --   --   --    < > 10  --  13   NILSON  --   --   --  9.2  --  9.5  --   --   --  9.0  --   --   --    < > 8.8  --  9.0   * 225* 224* 253*   < > 188*   < >  --    < > 170*  --    < >  --    < > 90   < > 128*   ALBUMIN  --   --   --  3.0*  --  3.3*  --   --   --  3.1*  --   --   --    < >  --   --  2.9*   PROTTOTAL  --   --   --  6.5  --  7.2  --   --   --  6.6  --   --   --    < >  --   --  6.1*   BILITOTAL  --   --   --  0.6  --  0.8  --   --   --  0.6  --   --   --    < >  --   --  1.1   ALKPHOS  --   --   --  73  --  79  --   --   --  77  --   --   --    < >  --   --  77   ALT  --   --   --  13  --  16  --   --   --  17  --   --   --    < >  --   --  18   AST  --   --   --  14  --  17  --   --   --  22  --   --   --    < >  --   --  25   LIPASE  --   --   --   --   --   --   --   --   --   --   --   --   --   --   --   --  88*    < > = values in this interval not displayed.     No results found for this or any previous visit (from the past 24 hour(s)).  Medications     dextrose       lactated ringers 35 mL/hr at 11/02/22 0900     parenteral nutrition - Clinimix E       parenteral nutrition - Clinimix E 65 mL/hr at 11/02/22 1236       atorvastatin  80 mg Oral Daily     clopidogrel  75 mg Oral Daily     lipids  250 mL Intravenous Q24H    And     - MEDICATION INSTRUCTIONS -   Does not apply Q24H     empagliflozin  10 mg Oral Daily     insulin aspart  1-12 Units Subcutaneous Q4H     insulin glargine  10 Units Subcutaneous QAM AC      lisinopril  10 mg Oral Daily     metoprolol succinate ER  75 mg Oral BID     pantoprazole  40 mg Intravenous Q12H     piperacillin-tazobactam  3.375 g Intravenous Q6H     pregabalin  150 mg Oral QPM     sodium chloride (PF)  3 mL Intracatheter Q8H     tamsulosin  0.4 mg Oral Daily

## 2022-11-03 VITALS
DIASTOLIC BLOOD PRESSURE: 69 MMHG | WEIGHT: 197.53 LBS | HEART RATE: 61 BPM | OXYGEN SATURATION: 98 % | SYSTOLIC BLOOD PRESSURE: 148 MMHG | TEMPERATURE: 97.6 F | RESPIRATION RATE: 18 BRPM | BODY MASS INDEX: 28.28 KG/M2 | HEIGHT: 70 IN

## 2022-11-03 LAB
ALBUMIN SERPL BCG-MCNC: 3.2 G/DL (ref 3.5–5.2)
ALP SERPL-CCNC: 74 U/L (ref 40–129)
ALT SERPL W P-5'-P-CCNC: 15 U/L (ref 10–50)
ANION GAP SERPL CALCULATED.3IONS-SCNC: 11 MMOL/L (ref 7–15)
AST SERPL W P-5'-P-CCNC: 15 U/L (ref 10–50)
BILIRUB SERPL-MCNC: 0.4 MG/DL
BUN SERPL-MCNC: 19.9 MG/DL (ref 8–23)
CALCIUM SERPL-MCNC: 9.5 MG/DL (ref 8.8–10.2)
CHLORIDE SERPL-SCNC: 101 MMOL/L (ref 98–107)
CREAT SERPL-MCNC: 0.92 MG/DL (ref 0.67–1.17)
DEPRECATED HCO3 PLAS-SCNC: 22 MMOL/L (ref 22–29)
ERYTHROCYTE [DISTWIDTH] IN BLOOD BY AUTOMATED COUNT: 16.4 % (ref 10–15)
GFR SERPL CREATININE-BSD FRML MDRD: 87 ML/MIN/1.73M2
GLUCOSE BLDC GLUCOMTR-MCNC: 244 MG/DL (ref 70–99)
GLUCOSE BLDC GLUCOMTR-MCNC: 252 MG/DL (ref 70–99)
GLUCOSE BLDC GLUCOMTR-MCNC: 269 MG/DL (ref 70–99)
GLUCOSE BLDC GLUCOMTR-MCNC: 287 MG/DL (ref 70–99)
GLUCOSE SERPL-MCNC: 339 MG/DL (ref 70–99)
HCT VFR BLD AUTO: 42 % (ref 40–53)
HGB BLD-MCNC: 13.6 G/DL (ref 13.3–17.7)
MCH RBC QN AUTO: 32.3 PG (ref 26.5–33)
MCHC RBC AUTO-ENTMCNC: 32.4 G/DL (ref 31.5–36.5)
MCV RBC AUTO: 100 FL (ref 78–100)
PLATELET # BLD AUTO: 186 10E3/UL (ref 150–450)
POTASSIUM SERPL-SCNC: 4.6 MMOL/L (ref 3.4–5.3)
PROCALCITONIN SERPL IA-MCNC: 0.05 NG/ML
PROT SERPL-MCNC: 6.9 G/DL (ref 6.4–8.3)
RBC # BLD AUTO: 4.21 10E6/UL (ref 4.4–5.9)
SODIUM SERPL-SCNC: 134 MMOL/L (ref 136–145)
WBC # BLD AUTO: 9.7 10E3/UL (ref 4–11)

## 2022-11-03 PROCEDURE — 85027 COMPLETE CBC AUTOMATED: CPT | Performed by: INTERNAL MEDICINE

## 2022-11-03 PROCEDURE — 250N000011 HC RX IP 250 OP 636: Performed by: PHYSICIAN ASSISTANT

## 2022-11-03 PROCEDURE — 80053 COMPREHEN METABOLIC PANEL: CPT | Performed by: INTERNAL MEDICINE

## 2022-11-03 PROCEDURE — C9113 INJ PANTOPRAZOLE SODIUM, VIA: HCPCS | Performed by: PHYSICIAN ASSISTANT

## 2022-11-03 PROCEDURE — 250N000011 HC RX IP 250 OP 636: Performed by: INTERNAL MEDICINE

## 2022-11-03 PROCEDURE — 84145 PROCALCITONIN (PCT): CPT | Performed by: PHYSICIAN ASSISTANT

## 2022-11-03 PROCEDURE — 99239 HOSP IP/OBS DSCHRG MGMT >30: CPT | Performed by: INTERNAL MEDICINE

## 2022-11-03 PROCEDURE — 250N000013 HC RX MED GY IP 250 OP 250 PS 637: Performed by: PHYSICIAN ASSISTANT

## 2022-11-03 PROCEDURE — 250N000013 HC RX MED GY IP 250 OP 250 PS 637: Performed by: INTERNAL MEDICINE

## 2022-11-03 RX ORDER — PANTOPRAZOLE SODIUM 40 MG/1
40 TABLET, DELAYED RELEASE ORAL DAILY
Qty: 60 TABLET | Refills: 0 | Status: SHIPPED | OUTPATIENT
Start: 2022-11-03 | End: 2023-01-24

## 2022-11-03 RX ORDER — WARFARIN SODIUM 2.5 MG/1
TABLET ORAL
Qty: 70 TABLET | Refills: 1 | COMMUNITY
Start: 2022-11-03 | End: 2023-01-09

## 2022-11-03 RX ADMIN — INSULIN ASPART 5 UNITS: 100 INJECTION, SOLUTION INTRAVENOUS; SUBCUTANEOUS at 09:00

## 2022-11-03 RX ADMIN — INSULIN ASPART 6 UNITS: 100 INJECTION, SOLUTION INTRAVENOUS; SUBCUTANEOUS at 02:13

## 2022-11-03 RX ADMIN — Medication: at 08:12

## 2022-11-03 RX ADMIN — LISINOPRIL 10 MG: 10 TABLET ORAL at 08:11

## 2022-11-03 RX ADMIN — INSULIN ASPART 10 UNITS: 100 INJECTION, SOLUTION INTRAVENOUS; SUBCUTANEOUS at 05:57

## 2022-11-03 RX ADMIN — TAZOBACTAM SODIUM AND PIPERACILLIN SODIUM 3.38 G: 375; 3 INJECTION, SOLUTION INTRAVENOUS at 04:07

## 2022-11-03 RX ADMIN — PANTOPRAZOLE SODIUM 40 MG: 40 INJECTION, POWDER, FOR SOLUTION INTRAVENOUS at 11:37

## 2022-11-03 RX ADMIN — TAZOBACTAM SODIUM AND PIPERACILLIN SODIUM 3.38 G: 375; 3 INJECTION, SOLUTION INTRAVENOUS at 09:21

## 2022-11-03 RX ADMIN — CLOPIDOGREL BISULFATE 75 MG: 75 TABLET ORAL at 08:11

## 2022-11-03 RX ADMIN — INSULIN GLARGINE 10 UNITS: 100 INJECTION, SOLUTION SUBCUTANEOUS at 06:00

## 2022-11-03 RX ADMIN — METOPROLOL SUCCINATE 75 MG: 25 TABLET, EXTENDED RELEASE ORAL at 08:11

## 2022-11-03 RX ADMIN — EMPAGLIFLOZIN 10 MG: 10 TABLET, FILM COATED ORAL at 08:12

## 2022-11-03 RX ADMIN — TAMSULOSIN HYDROCHLORIDE 0.4 MG: 0.4 CAPSULE ORAL at 08:11

## 2022-11-03 RX ADMIN — ATORVASTATIN CALCIUM 80 MG: 80 TABLET, FILM COATED ORAL at 08:11

## 2022-11-03 ASSESSMENT — ACTIVITIES OF DAILY LIVING (ADL)
ADLS_ACUITY_SCORE: 26

## 2022-11-03 NOTE — PROGRESS NOTES
[facility] [facility] DISCHARGE NOTE    Patient discharged to home at 4:55 PM via wheel chair. Accompanied by spouse and staff. Discharge instructions reviewed with patient and spouse, opportunity offered to ask questions. Prescriptions sent to patients preferred pharmacy. All belongings sent with patient.    Nahomi López RN

## 2022-11-03 NOTE — PLAN OF CARE
Alert and orientated x4. Diet upgraded today to consistent carb, patient tolerated well no nausea or abdominal discomfort. Independent in room. TPN and lipids running. IV abx running. Patient anticipating discharge soon. Pleasant.

## 2022-11-03 NOTE — DISCHARGE INSTRUCTIONS
Take 5 mg of warfarin (2 X 2.5 mg tablets) daily, starting tonight Thursday, November 3rd until you can have your INR checked at the [facility]ticoagulation Clinic early next week. The Clinic will determine future dosing and set up further INR draws.

## 2022-11-03 NOTE — PHARMACY-ANTICOAGULATION SERVICE
Clinical Pharmacy- Warfarin Discharge Note  This patient is currently on warfarin for the treatment of Atrial fibrillation.  INR Goal= 2-3  Expected length of therapy  lifetime           Anticoagulation Dose History     Recent Dosing and Labs Latest Ref Rng & Units 10/25/2022 10/26/2022 10/26/2022 10/26/2022 10/28/2022 10/30/2022 10/31/2022    INR 0.85 - 1.15 1.81(H) 1.55(H) 1.35(H) 1.31(H) 1.26(H) 1.34(H) 1.30(H)          Vitamin K doses administered during the last 7 days:  2 mg IV Vitamin K on 10/26  FFP administered during the last 7 days: NA  Recommend discharging the patient on a warfarin regimen of 5 mg  Until seen by the [facility] .      Take 5 mg of warfarin (2 X 2.5 mg tablets) daily, starting tonight Thursday, November 3rd until you can have your INR checked at the Anticoagulation Clinic early next week. The Clinic will determine future dosing and set up further INR draws.

## 2022-11-03 NOTE — PROGRESS NOTES
Nutrition Note brief    TPN advanced to goal 11/2; patient diet advanced to moderate consistent carbohydrate. Per flowsheet documentation and RN notes patient ate 100% of meals and tolerated well. Patient last BM noted 11/2; GI noted to be WDL. Meds/labs reviewed.    Recommendations: Discontinue TPN per pharmacy protocol. Continue to encourage/monitor oral intakes.    Adriana Whitlock RDN, LD  Clinical Dietitian  Office: [phone]  Weekend pager: [phone]

## 2022-11-03 NOTE — DISCHARGE SUMMARY
[facility]  Discharge Summary  Hospital Medicine       Date of Admission:  10/25/2022  Date of Discharge:  11/3/2022  4:55 PM  Discharging Provider: Neo Shetty MD      Identification and Chief Compaint: Antoine Russo is a 76 year old male who presented on 10/25/2022 with complaint of epigastric pain.    Discharge Diagnoses   Probable recurrent pancreatitis  History of pancreatic pseudocyst  Acute gastritis with hemorrhage  Paroxysmal atrial fibrillation   Long term current use of anticoagulant therapy / Drug Induced Coagulation Defect  Type 2 diabetes mellitus with diabetic polyneuropathy, without long-term current use of insulin (H)  Leukocytosis with left shift  Spleen absent  Renal cyst  New right renal mass vs cyst  Coronary artery disease  Peripheral vascular disease / SFA disease  Hyperlipidemia LDL goal <100  Drug-induced platelet defect  Hypertension   Alcohol abuse  Hypertrophy of prostate without urinary obstruction  GERD (gastroesophageal reflux disease)  Peripheral polyneuropathy  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  The following labs/tests are recommended: 1. CT   abdomen to follow up peripancreatic fluid collection in 1 month. 2.   Consider CT abdomen/pelvis in 1 year to follow up new right renal cyst if   has not had other abdominal imaging at least 6 months from now in the   meantime.             Unresulted Labs Ordered in the Past 30 Days of this Admission     Date and Time Order Name Status Description    11/2/2022 12:00 AM Phosphatidylethanol (PEth), Whole Blood In process       These results will be followed up by PCP and/or GI on follow up    Hospital  "Course   Antoine Russo is a 76 year old male admitted on 10/25/2022. He presented to the emergency department for evaluation of epigastric pain and was found to have evidence of gastritis on imaging and concern for possible ulcer for which he is being admitted for further evaluation and treatment.     Probable recurrent pancreatitis  History of pancreatic pseudocyst  Acute gastritis with hemorrhage    Patient with known history of a distal pancreatectomy for IPMN of the tail in the setting of chronic pancreatitis.  He has history of necrotizing pancreatitis, pancreatic duct leak, and a history of a stent in 2018, since removed. Patient does admit to near daily drinking.       Presented with epigastric pain, CT abdomen & pelvis demonstrates \"Diffuse gastric wall thickening has increased since the previous exam and there is tubular new fluid density along the lesser curvature of the stomach. Differential would include deep ulcer or abscess, although the mucosa appears intact overlying this. This has developed since 9/13/2022. Given the fluid and inflammation surrounding the stomach and the fact that it abuts the resection margin of the pancreas, pancreatitis predominantly involving inflammation of the stomach cannot be excluded, but is considered unlikely. Endoscopic ultrasound may be useful for further evaluation...\"       EGD on 10/26 demonstrated diffusely erythematous gastric mucosa, with bleeding in gastric antrum with apparent pus. Patient given FFP, tranexamic acid, and vitamin K post-EGD. Surgeon concerned for perforation and recommending transfer for advanced endoscopy. Attending physician discussed with GI at [facility] 10/26/22, at that time agreed with need for advanced endoscopy and possible EUS. Started Zosyn on 10/26. PICC placed on 10/29/22 and started TPN.       Waited for transfer for 1 week, no beds available. Attending physician discussed again with GI (Dr. Beasley who knows patient) on 11/1/22. Per " "note from Dr. Beasley: \"This collection is inflammatory related to acute peripancreatic collection off tail of pancreas. EUS would not be helpful at this point. Recommend:    1) PETH test to see degree of ETOH consumption - sent and pending on day of discharge.     2) Follow w CT in one month - if still symptomatic and has walled off collection, could consider EUS transluminal drainage and perhaps ERCP to assess for pancreatic duct leak, however mostly resolve spontaneously\"    Diet advanced and tolerating. Weaned off TPN.     Treated 7 days with piperacillin-tazobactam empirically. Procalcitonin 0.05 and WBC normal on day of discharge. Stopped antibiotics.     IV Protonix changing to oral PPI daily for 2 months on discharge.     CT in 1 month to r/o development of pseudocyst. Follow up with surgeon to eval for symptoms and potential need for further work up in one month following the CT. These were scheduled prior to discharge.      Paroxysmal atrial fibrillation   Long term current use of anticoagulant therapy / Drug Induced Coagulation Defect    Anticoagulated prior to admission with coumadin, admit INR 1.81, reversed in the ED with vitamin K. Resuming warfarin on day of discharge. Continue metoprolol.      Type 2 diabetes mellitus with diabetic polyneuropathy, without long-term current use of insulin (H)    HgbA1c 8.2. Managed prior to admission with Jardiance 10 mg daily and Novolin NPH 40 U q am / 32 U q pm. Patient has not been using his insulin recently PTA due to poor oral intake.   Blood sugars increasing, likely related to increased diet. Started on Lantus 10 units on 10/30, regular insulin added to TPN.    's and now tolerating enteral diet. Patient to resume his NPH at home. I cautioned him to start at lower dose and titrate up and he is comfortable with this. Continue Jardiance.    Leukocytosis with left shift  Spleen absent    Admit WBC 16.0, ANC 13.9. Absence of spleen puts patient at higher " "risk for infection. Unclear if he is up to date on immunizations.     Afebrile. WBC has been variable, normalized to 9.7 on day of discharge.    Renal cysts  New right renal mass vs cyst    Incidental finding on CT abdomen & pelvis \"Multiple cysts are again seen throughout both kidneys are stable. One hemorrhagic cyst versus solid mass arising from the lateral right kidney is unchanged from September but new at that time. MRI could further evaluate if indicated.\"     I reviewed current and past imaging with radiologist on day of discharge. The lateral right kidney solid mass is new this fall. It could be a hemorrhagic cyst but neoplasm is not ruled out. While an MRI would be more definitive, given the slow growth of renal cancer (if present) it is also not unreasonable to follow on CT scan in 1 year, if he has not had a CT abdomen/pelvis for other reasons in the window of at least 6 months from now to a year.     Coronary artery disease  Peripheral vascular disease / SFA disease  Hyperlipidemia LDL goal <100  Drug-induced platelet defect  Known PVD, follows with vascular Dr. Stacy. Recent left lower extremity angiogram on 9/30/22. Known CAD on prior stress tests, but has never had PCI or CABG interventions. Managed prior to admission with atorvastatin 80 mg daily and clopidogrel 75 mg daily.   - Continue atorvastatin 80  - Resumed home Clopidogrel     Hypertension, benign essential, goal below 140/90  Managed prior to admission with lisinopril 10 mg daily and metoprolol XL 75 mg bid. Blood pressure intermittently elevated.   - Continue lisinopril and metoprolol      Alcohol abuse  Patient admits to near daily alcohol use, \"a few beers a day.\" Last drink on 10/22. No evidence of withdrawal at time of admission.  - No CIWA indicated this admission    Hypertrophy of prostate without urinary obstruction  - Managed prior to admission with Flomax 0.4 mg daily, continue.      GERD (gastroesophageal reflux disease)    " Noted on problem list, not on PPI or H2 blocker prior to admission.  - Start PPI as above     Peripheral polyneuropathy  - Managed prior to admission with Lyrica 150 mg q pm, continue.      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 Not acute. Continue with outpatient management.      Consultations This Hospital Stay   SURGERY GENERAL IP CONSULT    Ancillary Consultations This Hospital Stay   CARE MANAGEMENT / SOCIAL WORK IP CONSULT  VASCULAR ACCESS ADULT IP CONSULT  PHARMACY/NUTRITION TO START AND MANAGE TPN    Code Status   Full Code    The discharge plan was discussed with the patient and his wife at bedside, and they expressed understanding.     Time Spent on this Encounter   Total time on this discharge was 50 minutes.       Neo Shetty MD  [facility]  ______________________________________________________________________    Physical Exam   Vital Signs: Temp: 97.6  F (36.4  C) Temp src: Oral BP: (!) 148/69 Pulse: 61   Resp: 18 SpO2: 98 % O2 Device: None (Room air)    Weight: 197 lbs 8.51 oz  Constitutional: alert and oriented, NAD, nontoxic  CV: Regular   Respiratory: CTA bilaterally  GI: Soft, mild epigastric tenderness to deep palpation, active bowel sounds  Skin: Warm and dry       Primary Care Physician   Katie Yoder  [address] [place], [place] [address]     Discharge Disposition   Discharged to home  Condition at discharge: Stable    Significant Results and Procedures   Results for orders placed or performed during the hospital encounter of 10/25/22   CT Abdomen Pelvis w Contrast    Narrative    CT ABDOMEN/PELVIS WITH CONTRAST October 25, 2022 10:18 AM    CLINICAL HISTORY: Left upper quadrant abdominal pain, elevated lipase.    TECHNIQUE: CT scan of the abdomen and pelvis was performed following  injection of IV contrast.  Multiplanar reformats were obtained. Dose  reduction techniques were used.  CONTRAST: 97 mL Isovue-370.     COMPARISON: 9/13/2022, 10/28/2020, and 10/17/2020.    FINDINGS:   LOWER CHEST: Atelectasis is seen in both lung bases.    HEPATOBILIARY: Normal.    PANCREAS: Distal pancreatectomy is unchanged in appearance. No  inflammatory change to indicate CT evidence for pancreatitis. No  evidence of mass or pancreatic ductal dilation.    SPLEEN: Spleen is surgically absent.    ADRENAL GLANDS: Normal.    KIDNEYS/BLADDER: Multiple cysts are again identified throughout both  kidneys. One exophytic cyst at the posterolateral aspect of the mid  right kidney measures 13 mm and is soft tissue density. This is  unchanged from the previous exam dating back to 2020 and likely  represents a hemorrhagic cyst. MRI could verify if indicated. No  hydronephrosis. No bladder wall thickening or mass.    BOWEL: Diffuse gastric wall thickening is increased from September 2022. Along the lesser curvature there are two likely metallic clips  that are unchanged from the previous exam, but adjacent to this there  is an elongated fluid density measuring roughly 25 x 17 x 45 mm. The  mucosa over this appears intact, but this could represent deep ulcer  or abscess. There is mild inflammatory change and edema surrounding  the stomach indicating gastritis. The stomach abuts the resection  margin of the pancreas and pancreatitis with inflammation surrounding  the stomach would be unlikely, but cannot be excluded. Endoscopic  ultrasound may be useful for further evaluation. There are a few  prominent to borderline enlarged gastrohepatic lymph nodes present as  well. Subtotal colectomy is noted.    PELVIC ORGANS: No evidence for free fluid or adenopathy in the pelvis.    ADDITIONAL FINDINGS: The aorta is normal in caliber with extensive  atherosclerotic calcification. Several prominent retroperitoneal lymph  nodes are present.    MUSCULOSKELETAL:  Degenerative changes are noted through the spine.      Impression    IMPRESSION:   1.  Diffuse gastric wall thickening has increased since the previous  exam and there is tubular new fluid density along the lesser curvature  of the stomach. Differential would include deep ulcer or abscess,  although the mucosa appears intact overlying this. This has developed  since 9/13/2022. Given the fluid and inflammation surrounding the  stomach and the fact that it abuts the resection margin of the  pancreas, pancreatitis predominantly involving inflammation of the  stomach cannot be excluded, but is considered unlikely. Endoscopic  ultrasound may be useful for further evaluation.  2.  Multiple cysts are again seen throughout both kidneys. One  hemorrhagic cyst versus solid mass arising from the lateral right  kidney is unchanged. MRI could further evaluate if indicated.  3.  Splenectomy and distal pancreatectomy. No evidence for  inflammation of the residual pancreas.    JENNIFER MODI MD         SYSTEM ID:  [id number]   XR Chest Port 1 View    Narrative    EXAM: XR CHEST PORT 1 VIEW  LOCATION: [facility]  DATE/TIME: 10/29/2022 8:09 PM    INDICATION: PICC Placement  COMPARISON: The current study is dated 1958 hours.      Impression    IMPRESSION: Right-sided PICC line is identified. The tip extends superior and into the jugular vein. The heart is enlarged. No focal infiltrate, pleural effusion, or pneumothorax.    XR Chest Port 1 View    Narrative    EXAM: XR CHEST PORT 1 VIEW  LOCATION: [facility]  DATE/TIME: 10/29/2022 8:10 PM    INDICATION: PICC placement  COMPARISON: Study is dated 2002 hours.      Impression    IMPRESSION: NG tube is identified overlying the proximal/mid SVC. The heart is enlarged. No focal infiltrate, pleural effusion, or pneumothorax.     *Note: Due to a large number of results and/or encounters for the requested time period, some results have  not been displayed. A complete set of results can be found in Results Review.     Procedures    EGD    Discharge Orders      Medication Therapy Management Referral      Activity    Your activity upon discharge: activity as tolerated     Reason for your hospital stay    Recurrent pancreatitis due to alcohol use and associated with area of gastritis with bleeding.     Follow-up and recommended labs and tests     Follow up with primary care provider, Katie Yoder, within 7 days for hospital follow- up.  The following labs/tests are recommended: 1. CT abdomen to follow up peripancreatic fluid collection in 1 month. 2. Consider CT abdomen/pelvis in 1 year to follow up new right renal cyst if has not had other abdominal imaging at least 6 months from now in the meantime.     Diet    Follow this diet upon discharge: Diabetic diet     Discharge Medications   Current Discharge Medication List      START taking these medications    Details   pantoprazole (PROTONIX) 40 MG EC tablet Take 1 tablet (40 mg) by mouth daily  Qty: 60 tablet, Refills: 0    Associated Diagnoses: Gastrointestinal hemorrhage associated with acute gastritis         CONTINUE these medications which have NOT CHANGED    Details   atorvastatin (LIPITOR) 80 MG tablet Take 1 tablet (80 mg) by mouth daily  Qty: 30 tablet, Refills: 3    Associated Diagnoses: PVD (peripheral vascular disease) (H)      clopidogrel (PLAVIX) 75 MG tablet Take 1 tablet (75 mg) by mouth daily Start taking medication the day after the procedure.  Qty: 90 tablet, Refills: 1    Associated Diagnoses: PVD (peripheral vascular disease) (H)      Continuous Blood Gluc Sensor (FREESTYLE LISA 14 DAY SENSOR) MISC USE 1 SENSOR EVERY 14 DAYS  Qty: 6 each, Refills: 3    Associated Diagnoses: Type 2 diabetes mellitus with diabetic neuropathy, with long-term current use of insulin (H)      empagliflozin (JARDIANCE) 10 MG TABS tablet Take 1 tablet (10 mg) by mouth daily  Qty: 30 tablet, Refills: 3     Associated Diagnoses: Type 2 diabetes mellitus with diabetic neuropathy, with long-term current use of insulin (H)      insulin NPH (NOVOLIN N FLEXPEN RELION) 100 UNIT/ML injection Take 40 units of N in the am and 32 units in the pm.  Qty: 30 mL, Refills: 5    Comments: Fill when needed  Associated Diagnoses: Type 2 diabetes mellitus with diabetic neuropathy, with long-term current use of insulin (H)      insulin pen needle (BD LUIS U/F) 32G X 4 MM miscellaneous USE PEN NEEDLE ONCE DAILY AS DIRECTED  Qty: 60 each, Refills: 0      lisinopril (ZESTRIL) 10 MG tablet Take 1 tablet (10 mg) by mouth daily  Qty: 90 tablet, Refills: 3    Associated Diagnoses: Type 2 diabetes mellitus with diabetic neuropathy, with long-term current use of insulin (H); Hypertension, benign essential, goal below 140/90; Persistent proteinuria; Chronic kidney disease, stage 2 (mild)      metoprolol succinate ER (TOPROL XL) 50 MG 24 hr tablet TAKE 1 & 1/2 (ONE & ONE-HALF) TABLETS BY MOUTH TWICE DAILY  Qty: 270 tablet, Refills: 1    Associated Diagnoses: Hypertension, benign essential, goal below 140/90      multivitamin, therapeutic with minerals (THERA-VIT-M) TABS Take 1 tablet by mouth daily.  Qty: 30 each, Refills: 1    Associated Diagnoses: Surgery aftercare      pregabalin (LYRICA) 150 MG capsule Take 1 capsule by mouth twice daily  Qty: 60 capsule, Refills: 5    Associated Diagnoses: Type 2 diabetes mellitus with diabetic neuropathy, with long-term current use of insulin (H)      tamsulosin (FLOMAX) 0.4 MG capsule Take 1 capsule by mouth once daily  Qty: 90 capsule, Refills: 1    Associated Diagnoses: Urgency of urination; Hypertrophy of prostate without urinary obstruction      vitamin B-12 (CYANOCOBALAMIN) 100 MCG tablet Take 100 mcg by mouth daily      Vitamin D3 (CHOLECALCIFEROL) 25 mcg (1000 units) tablet Take 1 tablet by mouth daily      warfarin ANTICOAGULANT (COUMADIN) 2.5 MG tablet TAKE 2.5 mg every Tue, Thu, Sat; 1.25 mg all  other days OR AS DIRECTED BY ANTICOAGULATION CLINIC.  Qty: 70 tablet, Refills: 1    Associated Diagnoses: Chronic atrial fibrillation (H)      ASPIRIN NOT PRESCRIBED (INTENTIONAL) Please choose reason not prescribed from choices below.    Associated Diagnoses: Chronic atrial fibrillation (H)         STOP taking these medications       oxyCODONE-acetaminophen (PERCOCET) 5-325 MG tablet Comments:   Reason for Stopping:             Allergies   Allergies   Allergen Reactions     Nkda [No Known Drug Allergies]

## 2022-11-03 NOTE — PLAN OF CARE
Neuro: A/O x 4  Cardiac:  Apical pulse irregular at baseline.  Respiratory: LS are clear.  O2 sats in 90's on RA  GI/: No c/o nausea.   Diet/appetite:  Regular diet for dinner without issues.  TPN @ 90/hr and lipids @20/hr  Activity: Independent.  Steady gait.    Pain: No c/o pain [id number]  Skin: scattered bruising    LDA's:  PICC right upper arm patent.  Dressing intact.      Other: Bilateral lower extremity edema 1+     Plan: possible discharge home today 11/3/2022

## 2022-11-04 ENCOUNTER — TELEPHONE (OUTPATIENT)
Dept: ANTICOAGULATION | Facility: CLINIC | Age: 77
End: 2022-11-04

## 2022-11-04 ENCOUNTER — PATIENT OUTREACH (OUTPATIENT)
Dept: CARE COORDINATION | Facility: CLINIC | Age: 77
End: 2022-11-04

## 2022-11-04 DIAGNOSIS — I48.91 ATRIAL FIBRILLATION (H): Primary | Chronic | ICD-10-CM

## 2022-11-04 DIAGNOSIS — Z79.01 LONG TERM CURRENT USE OF ANTICOAGULANT THERAPY: Chronic | ICD-10-CM

## 2022-11-04 LAB
PLPETH BLD-MCNC: 130 NG/ML
POPETH BLD-MCNC: 234 NG/ML

## 2022-11-04 NOTE — TELEPHONE ENCOUNTER
ANTICOAGULATION  MANAGEMENT: Discharge Review    Antoine Russo chart reviewed for anticoagulation continuity of care    Hospital Admission on 10-25 to 11-3 for alcohol induced pancreatitis and acute gastritis with bleeding in gastric antrum with pus.    Discharge disposition: Home    Results:    Recent labs: (last 7 days)     10/30/22  0602 10/31/22  0636   INR 1.34* 1.30*     Anticoagulation inpatient management:     reversed with 2 mg vit k on10-25 and again 10-26 and held warfarin due to EGD  and resumed on 11-3 at discharge    Anticoagulation discharge instructions:     Warfarin dosing: increase dose to 5 mg daily per pharm notes and Next INR 11-7   Bridging: No   INR goal change: No      Medication changes affecting anticoagulation: Yes: protonix    Additional factors affecting anticoagulation: Yes: recent bleed, gastritis     PLAN     Recommend to check INR on 11-7    VM left to call [facility] back. Patient needs INR 11-7 and to review dosing as inpatient pharm notes are different from what AVS states.     Addendum: Patient did take 5 mg on 11-3. He will resume usual dose today and try to recheck on 11-7 but he is on call at work and doesn't know if he can. Pt to call [facility] if he cannot come on 11-7. Dosing was reviewed with Brianna Robles RP.    Anticoagulation Calendar updated    Aparna Kunz RN

## 2022-11-04 NOTE — PROGRESS NOTES
[facility]: Post-Discharge Note  SITUATION                                                      Admission:    Admission Date: 10/25/22   Reason for Admission: epigastric pain  Discharge:   Discharge Date: 11/03/22  Discharge Diagnosis: Probable recurrent pancreatitis    BACKGROUND                                                      Per hospital discharge summary and inpatient provider notes:  Antoine Russo is a 76 year old male admitted on 10/25/2022. He presented to the emergency department for evaluation of epigastric pain and was found to have evidence of gastritis on imaging and concern for possible ulcer for which he is being admitted for further evaluation and treatment    ASSESSMENT        Discharge Assessment  How are you doing now that you are home?: Feels good, but is upset about time of 11am lab appt on Monday.  Patient said that if he gets called into work on that day, that he will cancel appt.  I tried to reschedule, but nothing was available  Do you feel your condition is stable enough to be safe at home until your provider visit?: Yes  Does the patient have their discharge instructions? : Yes  Does the patient have questions regarding their discharge instructions? : No  Were you started on any new medications or were there changes to any of your previous medications? : No (Incomplete notes/patient doesn't think so.)  Does the patient have all of their medications?: Yes  Do you have questions regarding any of your medications? : No  Do you have all of your needed medical supplies or equipment (DME)?  (i.e. oxygen tank, CPAP, cane, etc.): Yes  Discharge follow-up appointment scheduled within 14 calendar days? : Yes  Discharge Follow Up Appointment Date: 11/16/22  Discharge Follow Up Appointment Scheduled with?: Specialty Care Provider    Post-op ([facility] Only)  If the patient had a surgery or procedure, do they have any questions for a nurse?: No        PLAN                                                       Outpatient Plan:     Follow up with primary care provider, Katie Martino, within 7 days for hospital follow- up.  The following labs/tests are recommended: 1. CT abdomen to follow up peripancreatic fluid collection in 1 month. 2. Consider CT abdomen/pelvis in 1 year to follow up new right renal cyst if has not had other abdominal imaging at least 6 months from now in the meantime         Future Appointments   Date Time Provider Department Center   11/7/2022 11:00 AM NB LAB [facility]   11/16/2022  7:20 AM Serafin Ji MD [facility]   11/22/2022  7:30 AM [facility]2 [facility]   11/22/2022  8:10 AM [facility]3 [facility]   11/22/2022  3:50 PM Lilia Stacy MD [facility]   12/2/2022  7:00 AM NB LAB [facility]   12/3/2022  9:00 AM [facility]1 [facility]   12/12/2022 10:30 AM [facility]   1/24/2023  7:00 AM Katie Martino MD [facility]         For any urgent concerns, please contact our 24 hour nurse triage line: [phone] ([phone])         WILDER De Leon  [phone]  [facility]

## 2022-11-07 ENCOUNTER — LAB (OUTPATIENT)
Dept: LAB | Facility: CLINIC | Age: 77
End: 2022-11-07
Payer: COMMERCIAL

## 2022-11-07 ENCOUNTER — TELEPHONE (OUTPATIENT)
Dept: FAMILY MEDICINE | Facility: CLINIC | Age: 77
End: 2022-11-07

## 2022-11-07 ENCOUNTER — ANTICOAGULATION THERAPY VISIT (OUTPATIENT)
Dept: ANTICOAGULATION | Facility: CLINIC | Age: 77
End: 2022-11-07

## 2022-11-07 DIAGNOSIS — I48.91 ATRIAL FIBRILLATION (H): Primary | Chronic | ICD-10-CM

## 2022-11-07 DIAGNOSIS — Z79.01 LONG TERM CURRENT USE OF ANTICOAGULANT THERAPY: ICD-10-CM

## 2022-11-07 DIAGNOSIS — Z79.01 LONG TERM CURRENT USE OF ANTICOAGULANT THERAPY: Chronic | ICD-10-CM

## 2022-11-07 DIAGNOSIS — I48.20 CHRONIC ATRIAL FIBRILLATION (H): ICD-10-CM

## 2022-11-07 LAB — INR BLD: 1.4 (ref 0.9–1.1)

## 2022-11-07 PROCEDURE — 85610 PROTHROMBIN TIME: CPT

## 2022-11-07 PROCEDURE — 36416 COLLJ CAPILLARY BLOOD SPEC: CPT

## 2022-11-07 NOTE — TELEPHONE ENCOUNTER
MTM referral from: Transitions of Care (recent hospital discharge or ED visit)    MTM referral outreach attempt #2 on November 7, 2022 at 10:25 AM      Outcome: Patient not reachable after several attempts, will route to MTM Pharmacist/Provider as an FYI.  [facility] scheduling number is [phone].  Thank you for the referral.    Rajeev Gordon, MT coordinator

## 2022-11-07 NOTE — LETTER
November 9, 2022      Antoine Russo  [address] [place] [id number]        Dear Dr. Paresh Schultz has recommended you schedule a Medication Therapy Management (MTM) appointment. MTM is designed to help you get the most of out of your medicines.     During an MTM appointment a specially trained pharmacist will review all of your medicines, both prescription and over-the-counter. They will make sure your medicines are the best choice for you and are safe and convenient for you.  MTM pharmacists work together with you and your doctor to help you understand your medicines, solve any problems related to your medicines and help you get the best results from taking your medicines.     At [facility], we strongly believe in a team approach to health care. We want to help you understand your medicines and health conditions. To learn more about how you might benefit from MTM services, watch the patient video at [email or web address].     To make an appointment, please call the clinic at [phone] or the MTM scheduling line at [phone] (toll-free at [phone]).    We look forward to hearing from you!        Ivette Bell, PharmD, BCACP  Medication Therapy Management Pharmacist  Pager: [phone]

## 2022-11-07 NOTE — PROGRESS NOTES
ANTICOAGULATION MANAGEMENT     Antoine Russo 76 year old male is on warfarin with subtherapeutic INR result. (Goal INR 2.0-3.0)    Recent labs: (last 7 days)     11/07/22  1101   INR 1.4*       ASSESSMENT       Source(s): Chart Review and Patient/Caregiver Call       Warfarin doses taken: Warfarin taken as instructed    Diet: No new diet changes identified    New illness, injury, or hospitalization: No    Medication/supplement changes: None noted    Signs or symptoms of bleeding or clotting: bruising has been improving     Previous INR: Subtherapeutic    Additional findings: None     PLAN     Recommended plan for no diet, medication or health factor changes affecting INR     Dosing Instructions: booster dose then continue your current warfarin dose with next INR in 4 days       Summary  As of 11/7/2022    Full warfarin instructions:  11/7: 3.75 mg; Otherwise 2.5 mg every Tue, Thu, Sat; 1.25 mg all other days; Starting 11/7/2022   Next INR check:  11/11/2022             Telephone call with Poli or Saleem who verbalizes understanding and agrees to plan    Lab visit scheduled    Education provided:     Please call back if any changes to your diet, medications or how you've been taking warfarin    Symptom monitoring: monitoring for clotting signs and symptoms    Plan made per [facility] anticoagulation protocol    Madhavi Sanders RN  Anticoagulation Clinic  11/7/2022    _______________________________________________________________________     Anticoagulation Episode Summary     Current INR goal:  2.0-3.0   TTR:  81.8 % (11.7 mo)   Target end date:  Indefinite   Send INR reminders to:  [facility]    Indications    Atrial fibrillation (H) [I48.91]  Long term current use of anticoagulant therapy [Z79.01]           Comments:           Anticoagulation Care Providers     Provider Role Specialty Phone number    Katie Martino MD Referring Family Medicine [phone]    Brianna Matute APRN CNP Referring  Family Medicine [phone]

## 2022-11-09 NOTE — TELEPHONE ENCOUNTER
Sent referral letter.    Ivette Bell, PharmD, [facility]  Medication Therapy Management Pharmacist  Pager: [phone]

## 2022-11-11 ENCOUNTER — LAB (OUTPATIENT)
Dept: LAB | Facility: CLINIC | Age: 77
End: 2022-11-11
Payer: COMMERCIAL

## 2022-11-11 ENCOUNTER — ANTICOAGULATION THERAPY VISIT (OUTPATIENT)
Dept: ANTICOAGULATION | Facility: CLINIC | Age: 77
End: 2022-11-11

## 2022-11-11 DIAGNOSIS — I48.20 CHRONIC ATRIAL FIBRILLATION (H): ICD-10-CM

## 2022-11-11 DIAGNOSIS — Z79.01 LONG TERM CURRENT USE OF ANTICOAGULANT THERAPY: Chronic | ICD-10-CM

## 2022-11-11 DIAGNOSIS — Z79.01 LONG TERM CURRENT USE OF ANTICOAGULANT THERAPY: ICD-10-CM

## 2022-11-11 DIAGNOSIS — I48.91 ATRIAL FIBRILLATION (H): Primary | Chronic | ICD-10-CM

## 2022-11-11 LAB — INR BLD: 2.2 (ref 0.9–1.1)

## 2022-11-11 PROCEDURE — 36416 COLLJ CAPILLARY BLOOD SPEC: CPT

## 2022-11-11 PROCEDURE — 85610 PROTHROMBIN TIME: CPT

## 2022-11-11 NOTE — PROGRESS NOTES
ANTICOAGULATION MANAGEMENT     Antoine Russo 76 year old male is on warfarin with therapeutic INR result. (Goal INR 2.0-3.0)    Recent labs: (last 7 days)     11/11/22  0723   INR 2.2*       ASSESSMENT       Source(s): Chart Review and Patient/Caregiver Call       Warfarin doses taken: Booster dose(s) recently taken which may be affecting INR    Diet: eating better now since home from hospital    New illness, injury, or hospitalization: Yes: recent hospitalization for gastritis    Medication/supplement changes: None noted    Signs or symptoms of bleeding or clotting: No    Previous INR: Subtherapeutic    Additional findings: None       PLAN     Recommended plan for no diet, medication or health factor changes affecting INR     Dosing Instructions: Continue your current warfarin dose with next INR in 5 days       Summary  As of 11/11/2022    Full warfarin instructions:  2.5 mg every Tue, Thu, Sat; 1.25 mg all other days; Starting 11/11/2022   Next INR check:  11/16/2022             Telephone call with Poli or Saleem who verbalizes understanding and agrees to plan    Check at provider office visit    Education provided:     Please call back if any changes to your diet, medications or how you've been taking warfarin    Contact [phone]  with any changes, questions or concerns.     Plan made per [facility] anticoagulation protocol    Aparna Kunz RN  Anticoagulation Clinic  11/11/2022    _______________________________________________________________________     Anticoagulation Episode Summary     Current INR goal:  2.0-3.0   TTR:  81.0 % (11.7 mo)   Target end date:  Indefinite   Send INR reminders to:  [facility]    Indications    Atrial fibrillation (H) [I48.91]  Long term current use of anticoagulant therapy [Z79.01]           Comments:           Anticoagulation Care Providers     Provider Role Specialty Phone number    Katie Martino MD Referring Family Medicine [phone]    Brianna Matute  FERNANDO Linares [facility] [phone]

## 2022-11-17 ENCOUNTER — MYC MEDICAL ADVICE (OUTPATIENT)
Dept: ANTICOAGULATION | Facility: CLINIC | Age: 77
End: 2022-11-17

## 2022-11-17 NOTE — TELEPHONE ENCOUNTER
Appointment set up for 11/21    Jzaz Key RN - [facility] Anticoagulation Clinic

## 2022-11-20 DIAGNOSIS — E11.40 TYPE 2 DIABETES MELLITUS WITH DIABETIC NEUROPATHY, WITH LONG-TERM CURRENT USE OF INSULIN (H): ICD-10-CM

## 2022-11-20 DIAGNOSIS — Z79.4 TYPE 2 DIABETES MELLITUS WITH DIABETIC NEUROPATHY, WITH LONG-TERM CURRENT USE OF INSULIN (H): ICD-10-CM

## 2022-11-21 ENCOUNTER — ANTICOAGULATION THERAPY VISIT (OUTPATIENT)
Dept: ANTICOAGULATION | Facility: CLINIC | Age: 77
End: 2022-11-21

## 2022-11-21 ENCOUNTER — LAB (OUTPATIENT)
Dept: LAB | Facility: CLINIC | Age: 77
End: 2022-11-21
Payer: COMMERCIAL

## 2022-11-21 DIAGNOSIS — Z79.01 LONG TERM CURRENT USE OF ANTICOAGULANT THERAPY: Chronic | ICD-10-CM

## 2022-11-21 DIAGNOSIS — Z79.01 LONG TERM CURRENT USE OF ANTICOAGULANT THERAPY: ICD-10-CM

## 2022-11-21 DIAGNOSIS — I48.91 ATRIAL FIBRILLATION (H): Primary | Chronic | ICD-10-CM

## 2022-11-21 DIAGNOSIS — I48.20 CHRONIC ATRIAL FIBRILLATION (H): ICD-10-CM

## 2022-11-21 LAB — INR BLD: 2 (ref 0.9–1.1)

## 2022-11-21 PROCEDURE — 85610 PROTHROMBIN TIME: CPT

## 2022-11-21 PROCEDURE — 36416 COLLJ CAPILLARY BLOOD SPEC: CPT

## 2022-11-21 RX ORDER — FLASH GLUCOSE SENSOR
KIT MISCELLANEOUS
Qty: 6 EACH | Refills: 3 | Status: SHIPPED | OUTPATIENT
Start: 2022-11-21 | End: 2023-10-05

## 2022-11-21 NOTE — PROGRESS NOTES
ANTICOAGULATION MANAGEMENT     Antoine Russo 76 year old male is on warfarin with therapeutic INR result. (Goal INR 2.0-3.0)    Recent labs: (last 7 days)     11/21/22  0654   INR 2.0*       ASSESSMENT       Source(s): Chart Review and Patient/Caregiver Call       Warfarin doses taken: Warfarin taken as instructed    Diet: No new diet changes identified    New illness, injury, or hospitalization: No    Medication/supplement changes: None noted    Signs or symptoms of bleeding or clotting: No    Previous INR: Subtherapeutic    Additional findings: None     PLAN     Recommended plan for no diet, medication or health factor changes affecting INR     Dosing Instructions: Continue your current warfarin dose with next INR in 3 weeks       Summary  As of 11/21/2022    Full warfarin instructions:  2.5 mg every Tue, Thu, Sat; 1.25 mg all other days; Starting 11/21/2022   Next INR check:  12/12/2022             Telephone call with Poli or Saleem who verbalizes understanding and agrees to plan    Lab visit scheduled    Education provided:     Please call back if any changes to your diet, medications or how you've been taking warfarin    Plan made per [facility] anticoagulation protocol    Madhavi Sanders RN  Anticoagulation Clinic  11/21/2022    _______________________________________________________________________     Anticoagulation Episode Summary     Current INR goal:  2.0-3.0   TTR:  81.0 % (11.7 mo)   Target end date:  Indefinite   Send INR reminders to:  [facility]    Indications    Atrial fibrillation (H) [I48.91]  Long term current use of anticoagulant therapy [Z79.01]           Comments:           Anticoagulation Care Providers     Provider Role Specialty Phone number    Katie Martino MD Referring Family Medicine [phone]    Brianna Matute APRN CNP Referring Family Medicine [phone]

## 2022-11-22 ENCOUNTER — HOSPITAL ENCOUNTER (OUTPATIENT)
Dept: ULTRASOUND IMAGING | Facility: CLINIC | Age: 77
Discharge: HOME | End: 2022-11-22
Attending: PHYSICIAN ASSISTANT
Payer: COMMERCIAL

## 2022-11-22 ENCOUNTER — VIRTUAL VISIT (OUTPATIENT)
Dept: VASCULAR SURGERY | Facility: CLINIC | Age: 77
End: 2022-11-22
Payer: COMMERCIAL

## 2022-11-22 DIAGNOSIS — I73.9 PAD (PERIPHERAL ARTERY DISEASE) (H): Primary | ICD-10-CM

## 2022-11-22 DIAGNOSIS — I73.9 PAD (PERIPHERAL ARTERY DISEASE) (H): ICD-10-CM

## 2022-11-22 DIAGNOSIS — I73.9 PERIPHERAL VASCULAR DISEASE (H): ICD-10-CM

## 2022-11-22 PROCEDURE — 93922 UPR/L XTREMITY ART 2 LEVELS: CPT

## 2022-11-22 PROCEDURE — 99213 OFFICE O/P EST LOW 20 MIN: CPT | Mod: 95 | Performed by: SURGERY

## 2022-11-22 PROCEDURE — 93926 LOWER EXTREMITY STUDY: CPT | Mod: LT

## 2022-11-22 NOTE — NURSING NOTE
Chief Complaint   Patient presents with     Follow Up     Patient declined individual allergy and medication review by support staff because they deny any changes and state that all information remains accurate since last reviewed/verified.    María Elena Enrique VF

## 2022-11-22 NOTE — PATIENT INSTRUCTIONS
Adeel Schultz,    Thank you for entrusting your care with us today. After your visit today with MD Lilia Stacy this is the plan that was discussed at your appointment.    Notify if anything worsens or progresses    Continue best medical management- Statin and asprin    I am including additional information on these things and our contact information if you have any questions or concerns.   Please do not hesitate to reach out to us if you felt we did not answer your questions or you are unsure of the treatment plan after your visit today. Our number is [phone].Thank you for trusting us with your care.         Again thank you for your time.     Lisa Garcia RN

## 2022-11-22 NOTE — PROGRESS NOTES
76-year-old male who is scheduled for billable telephone visit.  It lasted 10 minutes.  Patient has a history of right extremity angiogram with SFA intervention.  Patient states that his symptoms seem to improve.  Denies any nonhealing wounds or pain at rest.  Follow-up in 1 year with repeat studies.  Continue optimal medical management therapy.

## 2022-11-22 NOTE — PROGRESS NOTES
Poli Monge is a 76 year old who is being evaluated via a billable telephone visit.      What phone number would you like to be contacted at? [phone]  How would you like to obtain your AVS? MyChart  Phone call duration: *** minutes    María Elena BAUTISTA

## 2022-11-23 ENCOUNTER — TELEPHONE (OUTPATIENT)
Dept: FAMILY MEDICINE | Facility: CLINIC | Age: 77
End: 2022-11-23

## 2022-11-23 NOTE — TELEPHONE ENCOUNTER
I called patient to offer an MTM visit as requested by her [facility] insurance plan. Scheduled in-person MTM visit on 11/30.    Ivette Bell, PharmD, BCACP  Medication Therapy Management Pharmacist

## 2022-11-30 ENCOUNTER — OFFICE VISIT (OUTPATIENT)
Dept: PHARMACY | Facility: CLINIC | Age: 77
End: 2022-11-30
Payer: COMMERCIAL

## 2022-11-30 VITALS — SYSTOLIC BLOOD PRESSURE: 104 MMHG | DIASTOLIC BLOOD PRESSURE: 63 MMHG | HEART RATE: 82 BPM

## 2022-11-30 DIAGNOSIS — K21.01 GASTROESOPHAGEAL REFLUX DISEASE WITH ESOPHAGITIS AND HEMORRHAGE: Chronic | ICD-10-CM

## 2022-11-30 DIAGNOSIS — I73.9 PERIPHERAL VASCULAR DISEASE (H): Chronic | ICD-10-CM

## 2022-11-30 DIAGNOSIS — I10 HYPERTENSION, BENIGN ESSENTIAL, GOAL BELOW 140/90: Chronic | ICD-10-CM

## 2022-11-30 DIAGNOSIS — I48.20 CHRONIC ATRIAL FIBRILLATION (H): ICD-10-CM

## 2022-11-30 DIAGNOSIS — Z78.9 TAKES DIETARY SUPPLEMENTS: ICD-10-CM

## 2022-11-30 DIAGNOSIS — I25.10 CORONARY ARTERY DISEASE INVOLVING NATIVE HEART, UNSPECIFIED VESSEL OR LESION TYPE, UNSPECIFIED WHETHER ANGINA PRESENT: Chronic | ICD-10-CM

## 2022-11-30 DIAGNOSIS — N40.0 HYPERTROPHY OF PROSTATE WITHOUT URINARY OBSTRUCTION: ICD-10-CM

## 2022-11-30 DIAGNOSIS — E78.5 HYPERLIPIDEMIA LDL GOAL <100: Primary | Chronic | ICD-10-CM

## 2022-11-30 DIAGNOSIS — G62.9 PERIPHERAL POLYNEUROPATHY: Chronic | ICD-10-CM

## 2022-11-30 DIAGNOSIS — E11.42 TYPE 2 DIABETES MELLITUS WITH DIABETIC POLYNEUROPATHY, WITHOUT LONG-TERM CURRENT USE OF INSULIN (H): Chronic | ICD-10-CM

## 2022-11-30 PROCEDURE — 99605 MTMS BY PHARM NP 15 MIN: CPT | Performed by: PHARMACIST

## 2022-11-30 PROCEDURE — 99607 MTMS BY PHARM ADDL 15 MIN: CPT | Performed by: PHARMACIST

## 2022-11-30 NOTE — LETTER
"Recommended To-Do List      Prepared on: Nov 30, 2022       You can get the best results from your medications by completing the items on this \"To-Do List.\"      Bring your To-Do List when you go to your doctor. And, share it with your family or caregivers.    My To-Do List:  What we talked about: What I should do:   Continuous glucose monitoring    Change Freestyle Berta from reader to cell phone wendi.           What we talked about: What I should do:                       "

## 2022-11-30 NOTE — LETTER
_  Medication List        Prepared on: Nov 30, 2022     Bring your Medication List when you go to the doctor, hospital, or   emergency room. And, share it with your family or caregivers.     Note any changes to how you take your medications.  Cross out medications when you no longer use them.    Medication How I take it Why I use it Prescriber   atorvastatin (LIPITOR) 80 MG tablet Take 1 tablet (80 mg) by mouth daily Vascular disease Lilia Stacy MD   clopidogrel (PLAVIX) 75 MG tablet Take 1 tablet (75 mg) by mouth daily Vascular disease Domonique Carrizales PA-C   Continuous Blood Gluc Sensor (FREESTYLE LISA 14 DAY SENSOR) MISC Change every 14 days. Diabetes Katie Martino MD   empagliflozin (JARDIANCE) 10 MG TABS tablet Take 1 tablet (10 mg) by mouth daily Diabetes Katie Martino MD   insulin NPH (NOVOLIN N FLEXPEN RELION) 100 UNIT/ML injection Take 40 units under the skin every morning and 32 units every evening.  Diabetes Katie Martino MD   insulin pen needle (BD LUIS U/F) 32G X 4 MM miscellaneous USE PEN NEEDLE ONCE DAILY AS DIRECTED Diabetes Katie Martino MD   lisinopril (ZESTRIL) 10 MG tablet Take 1 tablet (10 mg) by mouth daily Blood pressure Katie Martino MD   metoprolol succinate ER (TOPROL XL) 50 MG 24 hr tablet TAKE ONE AND ONE-HALF TABLETS (75 MG) BY MOUTH TWICE DAILY Blood pressure Katie Martino MD   multivitamin, therapeutic with minerals (THERA-VIT-M) TABS Take 1 tablet by mouth daily. General health Katie Martino MD   pantoprazole (PROTONIX) 40 MG EC tablet Take 1 tablet (40 mg) by mouth daily GERD Neo Shetty MD   pregabalin (LYRICA) 150 MG capsule Take 1 capsule by mouth twice daily Neuropathy Katie Martino MD   tamsulosin (FLOMAX) 0.4 MG capsule Take 1 capsule by mouth once daily Prostate Katie Martino MD   vitamin B-12 (CYANOCOBALAMIN) 100 MCG tablet Take 1 tablet by mouth daily General health Katie Martino MD   Vitamin D3  (CHOLECALCIFEROL) 25 mcg (1000 units) tablet Take 1 tablet by mouth daily General health Katie Martino MD   warfarin ANTICOAGULANT (COUMADIN) 2.5 MG tablet Take 1 tablet (2.5mg) by mouth daily on Tues/Thurs/Sat and one-half tablet (1.25mg) on all other days as directed by [facility] Atrial Fibrillation Katie Martino MD         Add new medications, over-the-counter drugs, herbals, vitamins, or  minerals in the blank rows below.    Medication How I take it Why I use it Prescriber                          Allergies:      nkda [no known drug allergies]        Side effects I have had:               Other Information:              My notes and questions:

## 2022-11-30 NOTE — LETTER
December 1, 2022  Antoine LOPEZ Rafaela  [address] [place] [id number]    Dear DANIELLE Taylor [facility]     Thank you for talking with me on Nov 30, 2022 about your health and medications. As a follow-up to our conversation, I have included two documents:      1. Your Recommended To-Do List has steps you should take to get the best results from your medications.  2. Your Medication List will help you keep track of your medications and how to take them.    If you want to talk about these documents, please call Ivette Bell RPH at phone: [phone], Monday-Friday 8-4:30pm.    I look forward to working with you and your doctors to make sure your medications work well for you.    Sincerely,  Ivetet Bell RPH  [facility] Pharmacist, [facility]

## 2022-11-30 NOTE — PROGRESS NOTES
"Medication Therapy Management (MTM) Encounter    ASSESSMENT:                            Medication Adherence/Access: No issues identified    Hyperlipidemia/PAD/CAD: Stable. Patient is on high intensity statin which is indicated based on 2019 ACC/AHA guidelines for lipid management. Follow-up plan in place with vascular surgery.    Hypertension/Afib: Stable. INR is at goal of 2-3. Patient is meeting blood pressure goal of < 130/80mmHg.     Type 2 Diabetes: Patient is not meeting A1c goal of < 8%. Unknown if continuous glucose monitoring is at goal. Today assisted patient is setting up the [employer] wendi on his cell phone and got it connected to my [facility] practice. I will recheck CGM in 2 weeks then consider increasing Jardiance dose and adjusting insulin as appropriate.    Neuropathy: Stable. Lyrica is effective.     GERD/Pancreatitis Chronic PPI therapy is appropriate. Follow-up plan in place with GI surgeon.    BPH: Stable.    Supplement: Stable.    PLAN:                            1. Change Freestyle Berta from reader to cell phone wendi.  2. Continue same medications for now, we can consider increasing Jardiance dose and adjusting insulin at our follow-up visit if necessary.    Follow-up: Return in 19 days (on 12/19/2022) for Medication Therapy Management, via phone.    SUBJECTIVE/OBJECTIVE:                          Antoine Russo is a 76 year old male coming in for an initial visit. He was referred to me from his [employer] insurance plan.      Reason for visit: Comprehensive medication review, no specific concerns today.    Allergies/ADRs: Reviewed in chart  Past Medical History: Reviewed in chart  Tobacco: He reports that he quit smoking about 16 years ago. His smoking use included cigarettes. He has a 40.00 pack-year smoking history. He has never used smokeless tobacco.  Alcohol: \"a few beers daily\"    Medication Adherence/Access:   Patient takes medications directly from bottles.  Patient takes " "medications 2 time(s) per day.   Per patient, misses medication 0 times per week.   Medication barriers: none.   The patient fills medications at [facility]: NO, fills medications at [facility].    Hyperlipidemia/PAD/CAD: Current therapy includes atorvastatin 80mg daily and clopidogrel 75mg daily. Patient reports no significant myalgias or other side effects. Last September got \"roto rooter\" in left leg artery. Follows with vascular surgeon Dr. Stacy.  The 10-year ASCVD risk score (Chaka JACKSON, et al., 2019) is: 33.3%    Values used to calculate the score:      Age: 76 years      Sex: Male      Is Non- : No      Diabetic: Yes      Tobacco smoker: No      Systolic Blood Pressure: 104 mmHg      Is BP treated: Yes      HDL Cholesterol: 36 mg/dL      Total Cholesterol: 137 mg/dL  Recent Labs   Lab Test 10/20/22  0736 01/18/22  0657 01/18/16  0843 04/14/15  0853   CHOL 137 116   < > 144   HDL 36* 30*   < > 27*   LDL 67 48   < > 76   TRIG 170* 191*   < > 206*   CHOLHDLRATIO  --   --   --  5.3*    < > = values in this interval not displayed.     Hypertension/Afib: Current medications include lisinopril 10mg daily, metoprolol ER 75mg twice daily, warfarin 2.5mg on Tues/Thurs/Sat and 1.25mg on all other days as directed by anticoag service. Patient does not self-monitor blood pressure. Patient reports no current medication side effects. Patient endorses bruising easily in left arm, however right arm is fine and no other bleeding concerns. Patient does have a history of GI bleed.    [id number] Score    Date Calculated: 10/20/2022  7:33 AM  [id number] Score: 4        BP Readings from Last 3 Encounters:   11/30/22 104/63   11/03/22 (!) 148/69   10/22/22 139/87     Pulse Readings from Last 3 Encounters:   11/30/22 82   11/03/22 61   10/22/22 81     INR   Date Value Ref Range Status   11/21/2022 2.0 (H) 0.9 - 1.1 Final     Type 2 Diabetes:  Currently taking Jardiance 10mg daily, Novolin N 40 units every " "morning and 32 every evening. Patient is not experiencing side effects. Note history of recurrent pancreatitis so need to avoid GLP-1 agonists and DPP-4 inhibitors. Also follows with Felipa RICK. Initiated on Jardiance last August, denies any urinary issues.  Blood sugar monitoring: Continuous Glucose Monitor - Freestyle Berta 14 day. Ranges (patient reported): 130's mg/dL. Patient uses a reader, which he didn't bring today, however his smart phone is compatible with wendi.  Symptoms of low blood sugar? Shaky, weak.  Symptoms of high blood sugar? Neuropathy.  Eye exam: due  Foot exam: up to date  Diet/Exercise: Did not discuss today.  Aspirin: Not taking due to warfarin/Plavix  Statin: Yes: atorvastatin.  ACEi/ARB: Yes: lisinopril.   Urine Albumin:   Lab Results   Component Value Date    UMALCR 518.41 (H) 10/20/2022      Lab Results   Component Value Date    A1C 8.2 10/20/2022    A1C 9.2 07/19/2022    A1C 9.1 04/19/2022    A1C 9.3 01/18/2022    A1C 8.6 10/14/2021    A1C 7.1 10/03/2020    A1C 6.3 05/12/2020    A1C 6.5 02/04/2020    A1C 9.0 10/17/2019    A1C 9.7 07/18/2019     Neuropathy: Currently taking Lyrica 150mg twice daily. Patient describes pain as \"feet are numb but they hurt like hell\". He tried reducing Lyrica to once daily because he wasn't sure if it was working, however neuropathy worsened so increased back to twice daily a couple days ago. Patient is not experiencing side effects.     GERD/Pancreatitis: Patient was hospitalized in October with probably recurrent pancreatitis and acute gastritis with hemorrhage. Current medications include: Protonix (pantoprazole) 40mg once daily. Patient reports no current symptoms. The patient does have a history of GI bleed. Patient feels that current regimen is effective. Upcoming GI surgery appointment scheduled on 12/12 for hospital and EGD follow-up.    BPH: Currently taking tamsulosin 0.4mg daily. Patient feels current therapy is effective and reports no " side effects.    Supplement: Currently taking multivitamin 1 tablet daily, vitamin B12 100mcg daily, and vitamin D 1000 units daily. No concerns today.    Today's Vitals: /63   Pulse 82      Last Comprehensive Metabolic Panel:  Sodium   Date Value Ref Range Status   11/03/2022 134 (L) 136 - 145 mmol/L Final   02/22/2021 136 133 - 144 mmol/L Final     Potassium   Date Value Ref Range Status   11/03/2022 4.6 3.4 - 5.3 mmol/L Final   07/19/2022 4.7 3.4 - 5.3 mmol/L Final   02/22/2021 4.2 3.4 - 5.3 mmol/L Final     Chloride   Date Value Ref Range Status   11/03/2022 101 98 - 107 mmol/L Final   07/19/2022 101 94 - 109 mmol/L Final   02/22/2021 103 94 - 109 mmol/L Final     Carbon Dioxide   Date Value Ref Range Status   02/22/2021 29 20 - 32 mmol/L Final     Carbon Dioxide (CO2)   Date Value Ref Range Status   11/03/2022 22 22 - 29 mmol/L Final   07/19/2022 30 20 - 32 mmol/L Final     Anion Gap   Date Value Ref Range Status   11/03/2022 11 7 - 15 mmol/L Final   07/19/2022 4 3 - 14 mmol/L Final   02/22/2021 4 3 - 14 mmol/L Final     Glucose   Date Value Ref Range Status   07/19/2022 152 (H) 70 - 99 mg/dL Final   02/22/2021 137 (H) 70 - 99 mg/dL Final     GLUCOSE BY METER POCT   Date Value Ref Range Status   11/03/2022 244 (H) 70 - 99 mg/dL Final     Urea Nitrogen   Date Value Ref Range Status   11/03/2022 19.9 8.0 - 23.0 mg/dL Final   07/19/2022 16 7 - 30 mg/dL Final   02/22/2021 13 7 - 30 mg/dL Final     Creatinine   Date Value Ref Range Status   11/03/2022 0.92 0.67 - 1.17 mg/dL Final   02/22/2021 1.11 0.66 - 1.25 mg/dL Final     GFR Estimate   Date Value Ref Range Status   11/03/2022 87 >60 mL/min/1.73m2 Final     Comment:     Effective December 21, 2021 eGFRcr in adults is calculated using the 2021 CKD-EPI creatinine equation which includes age and gender (Vijaya et al., NEJM, DOI: 10.1056/QACQng2726668)   02/22/2021 65 >60 mL/min/[1.73_m2] Final     Comment:     Non  GFR Calc  Starting  12/18/2018, serum creatinine based estimated GFR (eGFR) will be   calculated using the Chronic Kidney Disease Epidemiology Collaboration   (CKD-EPI) equation.       GFR, ESTIMATED POCT   Date Value Ref Range Status   09/13/2022 >60 >60 mL/min/1.73m2 Final     Calcium   Date Value Ref Range Status   11/03/2022 9.5 8.8 - 10.2 mg/dL Final   02/22/2021 9.5 8.5 - 10.1 mg/dL Final     ----------------  Post Medication Reconciliation Status: discharge medications reconciled, continue medications without change    I spent 45 minutes with this patient today. All changes were made via collaborative practice agreement with Katie Yoder MD. A copy of the visit note was provided to the patient's provider(s).    A summary of these recommendations was sent via [employer] #usemelon.    Ivette Bell, PharmD, BCACP  Medication Therapy Management Pharmacist  Pager: [phone]     Medication Therapy Recommendations  Type 2 diabetes mellitus with diabetic polyneuropathy, without long-term current use of insulin (H)    Current Medication: Continuous Blood Gluc Sensor (FREESTYLE LISA 14 DAY SENSOR) MISC   Rationale: Does not understand instructions - Adherence - Adherence   Recommendation: Provide Education - Change reader to mobile wendi.   Status: Patient Agreed - Adherence/Education

## 2022-12-01 NOTE — PATIENT INSTRUCTIONS
"Recommendations from today's MTM visit:                                                    MTM (medication therapy management) is a service provided by a clinical pharmacist designed to help you get the most of out of your medicines.   Today we reviewed what your medicines are for, how to know if they are working, that your medicines are safe and how to make your medicine regimen as easy as possible.      1. Change Freestyle Berta from reader to cell phone wendi.  2. Continue same medications for now, we can consider increasing Jardiance dose and adjusting insulin at our follow-up visit if necessary.    Follow-up: Return in 19 days (on 12/19/2022) for Medication Therapy Management, via phone.    It was great speaking with you today.  I value your experience and would be very thankful for your time in providing feedback in our clinic survey. In the next few days, you may receive an email or text message from [employer] with a link to a survey related to your  clinical pharmacist.\"     To schedule another MTM appointment, please call the clinic directly or you may call the MTM scheduling line at [phone] or toll-free at [phone].     My Clinical Pharmacist's contact information:                                                      Please feel free to contact me with any questions or concerns you have.      Ivette Bell, PharmD, BCACP  Medication Therapy Management Pharmacist  Pager: [phone]    "

## 2022-12-03 ENCOUNTER — HOSPITAL ENCOUNTER (OUTPATIENT)
Dept: CT IMAGING | Facility: CLINIC | Age: 77
Discharge: HOME | End: 2022-12-03
Attending: INTERNAL MEDICINE | Admitting: INTERNAL MEDICINE
Payer: COMMERCIAL

## 2022-12-03 DIAGNOSIS — K85.20 ALCOHOL-INDUCED ACUTE PANCREATITIS, UNSPECIFIED COMPLICATION STATUS: ICD-10-CM

## 2022-12-03 PROCEDURE — 250N000011 HC RX IP 250 OP 636: Performed by: RADIOLOGY

## 2022-12-03 PROCEDURE — 74177 CT ABD & PELVIS W/CONTRAST: CPT

## 2022-12-03 PROCEDURE — 250N000009 HC RX 250: Performed by: RADIOLOGY

## 2022-12-03 RX ORDER — IOPAMIDOL 755 MG/ML
96 INJECTION, SOLUTION INTRAVASCULAR ONCE
Status: COMPLETED | OUTPATIENT
Start: 2022-12-03 | End: 2022-12-03

## 2022-12-03 RX ADMIN — SODIUM CHLORIDE 64 ML: 9 INJECTION, SOLUTION INTRAVENOUS at 09:04

## 2022-12-03 RX ADMIN — IOPAMIDOL 96 ML: 755 INJECTION, SOLUTION INTRAVENOUS at 09:04

## 2022-12-12 ENCOUNTER — ANTICOAGULATION THERAPY VISIT (OUTPATIENT)
Dept: ANTICOAGULATION | Facility: CLINIC | Age: 77
End: 2022-12-12

## 2022-12-12 ENCOUNTER — LAB (OUTPATIENT)
Dept: LAB | Facility: CLINIC | Age: 77
End: 2022-12-12
Payer: COMMERCIAL

## 2022-12-12 ENCOUNTER — OFFICE VISIT (OUTPATIENT)
Dept: SURGERY | Facility: CLINIC | Age: 77
End: 2022-12-12
Payer: COMMERCIAL

## 2022-12-12 VITALS — SYSTOLIC BLOOD PRESSURE: 115 MMHG | TEMPERATURE: 97.6 F | DIASTOLIC BLOOD PRESSURE: 69 MMHG | HEART RATE: 87 BPM

## 2022-12-12 DIAGNOSIS — Z79.01 LONG TERM CURRENT USE OF ANTICOAGULANT THERAPY: Chronic | ICD-10-CM

## 2022-12-12 DIAGNOSIS — I48.20 CHRONIC ATRIAL FIBRILLATION (H): ICD-10-CM

## 2022-12-12 DIAGNOSIS — C04.0 MALIGNANT NEOPLASM OF ANTERIOR PORTION OF FLOOR OF MOUTH (H): Chronic | ICD-10-CM

## 2022-12-12 DIAGNOSIS — K29.00 ACUTE GASTRITIS WITHOUT HEMORRHAGE, UNSPECIFIED GASTRITIS TYPE: Primary | ICD-10-CM

## 2022-12-12 DIAGNOSIS — I48.20 CHRONIC ATRIAL FIBRILLATION (H): Primary | Chronic | ICD-10-CM

## 2022-12-12 DIAGNOSIS — Z79.01 LONG TERM CURRENT USE OF ANTICOAGULANT THERAPY: ICD-10-CM

## 2022-12-12 LAB — INR BLD: 1.3 (ref 0.9–1.1)

## 2022-12-12 PROCEDURE — 36416 COLLJ CAPILLARY BLOOD SPEC: CPT

## 2022-12-12 PROCEDURE — 85610 PROTHROMBIN TIME: CPT

## 2022-12-12 PROCEDURE — 99213 OFFICE O/P EST LOW 20 MIN: CPT | Performed by: SURGERY

## 2022-12-12 ASSESSMENT — PAIN SCALES - GENERAL: PAINLEVEL: NO PAIN (0)

## 2022-12-12 NOTE — PATIENT INSTRUCTIONS
Antoine is a 76 year old male who is status post EGD  with some  Chills this weekend but dut to some other the crud and was tired out over the weekend but feels good now  and no fever.      Patient's  Pain is  improving.  Appetite is  Improving.  Dr. Beasley thought it was more due to pancreatitis.  And had a cyst removed from pancreatitis and has had pancreatitis since then   Patient is feeling much better.  According to my note I was afraid of more bleeding so did not do any biopsies to check for h pylori.  He is feeling much better and if this is all due to pancreatitis and that is resolving.   Eating ok.  And no epigastric pain or melana or hematemesis.       Plan: follow up as needed.  If start having any epigastric pain or the above symptoms again we can do another EGD.   Not on proton pump inhibitor.

## 2022-12-12 NOTE — NURSING NOTE
"Initial /69   Pulse 87   Temp 97.6  F (36.4  C) (Tympanic)  Estimated body mass index is 28.34 kg/m  as calculated from the following:    Height as of 10/25/22: 1.778 m (5' 10\").    Weight as of 11/3/22: 89.6 kg (197 lb 8.5 oz). .    Velma Sigala LPN on 12/12/2022 at 10:01 AM    "

## 2022-12-12 NOTE — PROGRESS NOTES
ANTICOAGULATION MANAGEMENT     Antoine Russo 76 year old male is on warfarin with subtherapeutic INR result. (Goal INR 2.0-3.0)    Recent labs: (last 7 days)     12/12/22  0730   INR 1.3*       ASSESSMENT       Source(s): Chart Review and Patient/Caregiver Call       Warfarin doses taken: Warfarin taken as instructed    Diet: illness may be affecting diet and INR    New illness, injury, or hospitalization: Yes: patient states he was not feeling well last week, did not eat well and slept for 2 days. He denies missing doses or any emesis, just states he didn't eat.    Medication/supplement changes: None noted    Signs or symptoms of bleeding or clotting: No    Previous INR: Therapeutic last 2(+) visits    Additional findings: None       PLAN     Recommended plan for temporary change(s) affecting INR     Dosing Instructions: booster dose then Increase your warfarin dose (10% change) with next INR in 1 week. This is the smallest dose adjustment with pt's tablet strength, can increase up to 20% per protocol, discussed with patient and mutual decision to start with boost and smaller dose increase with recent temporary factors.   Patient requested this appt date/time and discussed signs/symptoms and reasons for sooner INR.    Summary  As of 12/12/2022    Full warfarin instructions:  12/12: 3.75 mg; Otherwise 1.25 mg every Mon, Wed, Fri; 2.5 mg all other days; Starting 12/12/2022   Next INR check:  12/20/2022             Telephone call with Poli or Saleem who verbalizes understanding and agrees to plan    Lab visit scheduled    Education provided:     Symptom monitoring: monitoring for clotting signs and symptoms, monitoring for stroke signs and symptoms and when to seek medical attention/emergency care    Importance of notifying anticoagulation clinic for: changes in medications; a sooner lab recheck maybe needed and diarrhea, nausea/vomiting, reduced intake, cold/flu, and/or infections; a sooner lab recheck maybe  needed    Plan made per [facility] anticoagulation protocol    Maria Victoria Castaneda, RN  Anticoagulation Clinic  12/12/2022    _______________________________________________________________________     Anticoagulation Episode Summary     Current INR goal:  2.0-3.0   TTR:  75.0 % (11.7 mo)   Target end date:  Indefinite   Send INR reminders to:  [facility]    Indications    Atrial fibrillation (H) [I48.91]  Long term current use of anticoagulant therapy [Z79.01]           Comments:           Anticoagulation Care Providers     Provider Role Specialty Phone number    Katie Martino MD Referring Family Medicine [phone]    Brianna Matute APRN [facility] Referring Family Medicine [phone]

## 2022-12-12 NOTE — LETTER
12/12/2022         RE: Antoine Russo  [address] [address]        Dear Colleague,    Thank you for referring your patient, Antoine Russo, to the [facility]. Please see a copy of my visit note below.    Antoine is a 76 year old male who is status post EGD  with some  Chills this weekend but dut to some other the crud and was tired out over the weekend but feels good now  and no fever.      Patient's  Pain is  improving.  Appetite is  Improving.  Dr. Beasley thought it was more due to pancreatitis.  And had a cyst removed from pancreatitis and has had pancreatitis since then   Patient is feeling much better.  According to my note I was afraid of more bleeding so did not do any biopsies to check for h pylori.  He is feeling much better and if this is all due to pancreatitis and that is resolving.   Eating ok.  And no epigastric pain or melana or hematemesis.       Plan: follow up as needed.  If start having any epigastric pain or the above symptoms again we can do another EGD.   Not on proton pump inhibitor.       Time spent with the patient with greater that 50% of the time in discussion was 20 minutes.  Jonatan Celeste MD        Narrative & Impression   EXAM: CT ABDOMEN PELVIS W CONTRAST  LOCATION: [facility]  DATE/TIME: 12/3/2022 9:19 AM     INDICATION: Follow-up peripancreatic fluid collection for resolution or development of pseudocyst.  COMPARISON: CT abdomen pelvis 10/25/2022. MRI 11/13/2020.  TECHNIQUE: CT scan of the abdomen and pelvis was performed following injection of IV contrast. Multiplanar reformats were obtained. Dose reduction techniques were used.  CONTRAST: 96 mL Isovue 370      FINDINGS:   LOWER CHEST: Bibasilar atelectasis. No consolidations or pleural effusions.     HEPATOBILIARY: Diffuse hepatic steatosis. No focal liver lesions. Normal gallbladder. No biliary ductal dilation.     PANCREAS: Status post distal  pancreatectomy. Mild stranding of the fat adjacent to the resected pancreas is unchanged. A 1.8 x 1.0 cm cystic lesion within the residual pancreatic head (2/#82) is unchanged since at least 11/13/2020. Remainder of the   residual pancreas is normal.     SPLEEN: Status post splenectomy.     ADRENAL GLANDS: Normal.     KIDNEYS/BLADDER: Scattered benign simple and hemorrhagic renal cysts are unchanged and do not require follow-up. Multifocal renal cortical scars. No hydronephrosis. Mildly thickened urinary bladder wall, unchanged, and most likely related to chronic   bladder outlet obstruction.     BOWEL: Interval resolution of the prior fluid collection along the lesser curvature of the stomach. The majority of gastric wall thickening has resolved, with mild residual thickening along the lesser curvature between the stomach and resected pancreas.   No new fluid collections. Small endoscopic clip is present along the lesser curvature of the stomach. No bowel obstruction or inflammation. Prior partial colectomy with a patent colonic anastomosis.     LYMPH NODES: No enlarged lymph node.     VASCULATURE: Moderate to severe aortobiiliac atherosclerotic calcifications. No abdominal aortic aneurysm.     PELVIC ORGANS: Normal.     MUSCULOSKELETAL: Multilevel degenerative changes of the spine. No acute bony abnormality. Small area of skin thickening within the ventral left lower quadrant, unchanged.                                                                      IMPRESSION:      1.  Since 10/25/2022, resolution of the prior fluid collection along the lesser curvature of the stomach. The majority of gastric wall thickening has resolved, with mild residual thickening along the lesser curvature between the stomach and resected   pancreas.      2.  No new fluid collections.     3.  Cystic 1.8 x 1.0 cm lesion within the residual pancreatic head is unchanged since the prior MRI on 11/13/2020. No dilation of the residual main  pancreatic duct. Follow-up per GI recommendations.         Narrative & Impression   CT ABDOMEN/PELVIS WITH CONTRAST October 25, 2022 10:18 AM     CLINICAL HISTORY: Left upper quadrant abdominal pain, elevated lipase.     TECHNIQUE: CT scan of the abdomen and pelvis was performed following  injection of IV contrast. Multiplanar reformats were obtained. Dose  reduction techniques were used.  CONTRAST: 97 mL Isovue-370.      COMPARISON: 9/13/2022, 10/28/2020, and 10/17/2020.     FINDINGS:   LOWER CHEST: Atelectasis is seen in both lung bases.     HEPATOBILIARY: Normal.     PANCREAS: Distal pancreatectomy is unchanged in appearance. No  inflammatory change to indicate CT evidence for pancreatitis. No  evidence of mass or pancreatic ductal dilation.     SPLEEN: Spleen is surgically absent.     ADRENAL GLANDS: Normal.     KIDNEYS/BLADDER: Multiple cysts are again identified throughout both  kidneys. One exophytic cyst at the posterolateral aspect of the mid  right kidney measures 13 mm and is soft tissue density. This is  unchanged from the previous exam dating back to 2020 and likely  represents a hemorrhagic cyst. MRI could verify if indicated. No  hydronephrosis. No bladder wall thickening or mass.     BOWEL: Diffuse gastric wall thickening is increased from September 2022. Along the lesser curvature there are two likely metallic clips  that are unchanged from the previous exam, but adjacent to this there  is an elongated fluid density measuring roughly 25 x 17 x 45 mm. The  mucosa over this appears intact, but this could represent deep ulcer  or abscess. There is mild inflammatory change and edema surrounding  the stomach indicating gastritis. The stomach abuts the resection  margin of the pancreas and pancreatitis with inflammation surrounding  the stomach would be unlikely, but cannot be excluded. Endoscopic  ultrasound may be useful for further evaluation. There are a few  prominent to borderline enlarged  gastrohepatic lymph nodes present as  well. Subtotal colectomy is noted.     PELVIC ORGANS: No evidence for free fluid or adenopathy in the pelvis.     ADDITIONAL FINDINGS: The aorta is normal in caliber with extensive  atherosclerotic calcification. Several prominent retroperitoneal lymph  nodes are present.     MUSCULOSKELETAL: Degenerative changes are noted through the spine.                                                                      IMPRESSION:   1.  Diffuse gastric wall thickening has increased since the previous  exam and there is tubular new fluid density along the lesser curvature  of the stomach. Differential would include deep ulcer or abscess,  although the mucosa appears intact overlying this. This has developed  since 9/13/2022. Given the fluid and inflammation surrounding the  stomach and the fact that it abuts the resection margin of the  pancreas, pancreatitis predominantly involving inflammation of the  stomach cannot be excluded, but is considered unlikely. Endoscopic  ultrasound may be useful for further evaluation.  2.  Multiple cysts are again seen throughout both kidneys. One  hemorrhagic cyst versus solid mass arising from the lateral right  kidney is unchanged. MRI could further evaluate if indicated.  3.  Splenectomy and distal pancreatectomy. No evidence for  inflammation of the residual pancreas.     JENNIFER MODI MD        _______________________________________________________________________________   Patient Name: Antoine Russo        Procedure Date: 10/26/2022 10:02 AM   MRN: [id number]                       YOB: 1945   Admit Type: Inpatient                 Age: 76   Gender: Male                          Attending MD: NEPTALI ALFONSO MD   Total Sedation Time:                     _______________________________________________________________________________       Procedure:             Upper GI endoscopy   Indications:           Epigastric  abdominal pain, Abnormal CT of the GI tract   Providers:             NEPTALI ALFONSO MD   Referring MD:             Medicines:             See the Anesthesia note for documentation of the                          administered medications   Complications:         No immediate complications.   _______________________________________________________________________________   Procedure:             After obtaining informed consent, the endoscope was                          passed under direct vision. Throughout the procedure,                          the patient's blood pressure, pulse, and oxygen                          saturations were monitored continuously. The Endoscope                          was introduced through the mouth, and advanced to the                          second part of duodenum. The upper GI endoscopy was                          accomplished without difficulty. The patient tolerated                          the procedure well.                                                                                     Findings:        LA Grade A (one or more mucosal breaks less than 5 mm, not extending        between tops of 2 mucosal folds) esophagitis with no bleeding was found        37 to 40 cm from the incisors.        Red blood was found in the gastric antrum.        Diffuse severely erythematous mucosa with bleeding was found in the        stomach. Biopsies were taken with a cold forceps for Helicobacter pylori        testing.        Diffuse mildly erythematous mucosa without active bleeding and with no        stigmata of bleeding was found in the ampulla, in the duodenal bulb, in        the first portion of the duodenum, in the second portion of the        duodenum, at the major papilla, at the minor papilla and in the area of        the papilla.                                                                                     Impression:            - LA Grade A reflux esophagitis with  no bleeding.                          - Red blood in the gastric antrum.                          - Erythematous mucosa in the stomach. Biopsied.                          - Erythematous duodenopathy.   Recommendation:        - Your EGD went well. I did take a biopsy of your                          stomach. There was an area that was bleeding where the                          gastric wall looked thick and after tryingto place a                          clip on this the first one fell off and then the next                          one stayed on but looked like there was pus coming                          from this swollen area. I was not able to stop the                          bleeding and feel that you will need more advanced                          procedure than we can do here. I could not see an                          ulcer due to the clot that is in your stomach. But the                          ct scan suggests a fluid filled area in the stomach                          wall.                          The fluid that drained out of it loked like pus.                          I did not do any more biopsies as I do not want to                          start more bleeding. The biopsy site in the antrum did                          stop bleeding after a short time.                          I have ordered 2 units of fresh frozen plasma to                          reverese your coumadin. to hopefully allow this to                          clot.                                                                                       _________________________   NEPTALI ALFONSO MD   10/26/2022 11:33:12 AM         Current Outpatient Medications   Medication Sig Dispense Refill     atorvastatin (LIPITOR) 80 MG tablet Take 1 tablet (80 mg) by mouth daily 30 tablet 3     clopidogrel (PLAVIX) 75 MG tablet Take 1 tablet (75 mg) by mouth daily Start taking medication the day after the procedure. 90 tablet 1      Continuous Blood Gluc Sensor (FREESTYLE LISA 14 DAY SENSOR) MISC Change every 14 days. 6 each 3     empagliflozin (JARDIANCE) 10 MG TABS tablet Take 1 tablet (10 mg) by mouth daily 30 tablet 3     insulin NPH (NOVOLIN N FLEXPEN RELION) 100 UNIT/ML injection Take 40 units of N in the am and 32 units in the pm. 30 mL 5     insulin pen needle (BD LUIS U/F) 32G X 4 MM miscellaneous USE PEN NEEDLE ONCE DAILY AS DIRECTED 60 each 0     lisinopril (ZESTRIL) 10 MG tablet Take 1 tablet (10 mg) by mouth daily 90 tablet 3     metoprolol succinate ER (TOPROL XL) 50 MG 24 hr tablet TAKE 1 & 1/2 (ONE & ONE-HALF) TABLETS BY MOUTH TWICE DAILY 270 tablet 1     multivitamin, therapeutic with minerals (THERA-VIT-M) TABS Take 1 tablet by mouth daily. 30 each 1     pantoprazole (PROTONIX) 40 MG EC tablet Take 1 tablet (40 mg) by mouth daily 60 tablet 0     pregabalin (LYRICA) 150 MG capsule Take 1 capsule by mouth twice daily 60 capsule 5     tamsulosin (FLOMAX) 0.4 MG capsule Take 1 capsule by mouth once daily 90 capsule 1     vitamin B-12 (CYANOCOBALAMIN) 100 MCG tablet Take 100 mcg by mouth daily       Vitamin D3 (CHOLECALCIFEROL) 25 mcg (1000 units) tablet Take 1 tablet by mouth daily       warfarin ANTICOAGULANT (COUMADIN) 2.5 MG tablet TAKE 2.5 mg X 2 (5mg ) daily until seen  BY ANTICOAGULATION CLINIC. 70 tablet 1     ASPIRIN NOT PRESCRIBED (INTENTIONAL) Please choose reason not prescribed from choices below. (Patient not taking: Reported on 12/12/2022)         Again, thank you for allowing me to participate in the care of your patient.        Sincerely,        Jonatan Celeste MD

## 2022-12-12 NOTE — PROGRESS NOTES
Antoine is a 76 year old male who is status post EGD  with some  Chills this weekend but dut to some other the crud and was tired out over the weekend but feels good now  and no fever.      Patient's  Pain is  improving.  Appetite is  Improving.  Dr. Beasley thought it was more due to pancreatitis.  And had a cyst removed from pancreatitis and has had pancreatitis since then   Patient is feeling much better.  According to my note I was afraid of more bleeding so did not do any biopsies to check for h pylori.  He is feeling much better and if this is all due to pancreatitis and that is resolving.   Eating ok.  And no epigastric pain or melana or hematemesis.       Plan: follow up as needed.  If start having any epigastric pain or the above symptoms again we can do another EGD.   Not on proton pump inhibitor.       Time spent with the patient with greater that 50% of the time in discussion was 20 minutes.  Jonatan Celeste MD        Narrative & Impression   EXAM: CT ABDOMEN PELVIS W CONTRAST  LOCATION: [facility]  DATE/TIME: 12/3/2022 9:19 AM     INDICATION: Follow-up peripancreatic fluid collection for resolution or development of pseudocyst.  COMPARISON: CT abdomen pelvis 10/25/2022. MRI 11/13/2020.  TECHNIQUE: CT scan of the abdomen and pelvis was performed following injection of IV contrast. Multiplanar reformats were obtained. Dose reduction techniques were used.  CONTRAST: 96 mL Isovue 370      FINDINGS:   LOWER CHEST: Bibasilar atelectasis. No consolidations or pleural effusions.     HEPATOBILIARY: Diffuse hepatic steatosis. No focal liver lesions. Normal gallbladder. No biliary ductal dilation.     PANCREAS: Status post distal pancreatectomy. Mild stranding of the fat adjacent to the resected pancreas is unchanged. A 1.8 x 1.0 cm cystic lesion within the residual pancreatic head (2/#82) is unchanged since at least 11/13/2020. Remainder of the   residual pancreas is normal.     SPLEEN:  Status post splenectomy.     ADRENAL GLANDS: Normal.     KIDNEYS/BLADDER: Scattered benign simple and hemorrhagic renal cysts are unchanged and do not require follow-up. Multifocal renal cortical scars. No hydronephrosis. Mildly thickened urinary bladder wall, unchanged, and most likely related to chronic   bladder outlet obstruction.     BOWEL: Interval resolution of the prior fluid collection along the lesser curvature of the stomach. The majority of gastric wall thickening has resolved, with mild residual thickening along the lesser curvature between the stomach and resected pancreas.   No new fluid collections. Small endoscopic clip is present along the lesser curvature of the stomach. No bowel obstruction or inflammation. Prior partial colectomy with a patent colonic anastomosis.     LYMPH NODES: No enlarged lymph node.     VASCULATURE: Moderate to severe aortobiiliac atherosclerotic calcifications. No abdominal aortic aneurysm.     PELVIC ORGANS: Normal.     MUSCULOSKELETAL: Multilevel degenerative changes of the spine. No acute bony abnormality. Small area of skin thickening within the ventral left lower quadrant, unchanged.                                                                      IMPRESSION:      1.  Since 10/25/2022, resolution of the prior fluid collection along the lesser curvature of the stomach. The majority of gastric wall thickening has resolved, with mild residual thickening along the lesser curvature between the stomach and resected   pancreas.      2.  No new fluid collections.     3.  Cystic 1.8 x 1.0 cm lesion within the residual pancreatic head is unchanged since the prior MRI on 11/13/2020. No dilation of the residual main pancreatic duct. Follow-up per GI recommendations.         Narrative & Impression   CT ABDOMEN/PELVIS WITH CONTRAST October 25, 2022 10:18 AM     CLINICAL HISTORY: Left upper quadrant abdominal pain, elevated lipase.     TECHNIQUE: CT scan of the abdomen and  pelvis was performed following  injection of IV contrast. Multiplanar reformats were obtained. Dose  reduction techniques were used.  CONTRAST: 97 mL Isovue-370.      COMPARISON: 9/13/2022, 10/28/2020, and 10/17/2020.     FINDINGS:   LOWER CHEST: Atelectasis is seen in both lung bases.     HEPATOBILIARY: Normal.     PANCREAS: Distal pancreatectomy is unchanged in appearance. No  inflammatory change to indicate CT evidence for pancreatitis. No  evidence of mass or pancreatic ductal dilation.     SPLEEN: Spleen is surgically absent.     ADRENAL GLANDS: Normal.     KIDNEYS/BLADDER: Multiple cysts are again identified throughout both  kidneys. One exophytic cyst at the posterolateral aspect of the mid  right kidney measures 13 mm and is soft tissue density. This is  unchanged from the previous exam dating back to 2020 and likely  represents a hemorrhagic cyst. MRI could verify if indicated. No  hydronephrosis. No bladder wall thickening or mass.     BOWEL: Diffuse gastric wall thickening is increased from September 2022. Along the lesser curvature there are two likely metallic clips  that are unchanged from the previous exam, but adjacent to this there  is an elongated fluid density measuring roughly 25 x 17 x 45 mm. The  mucosa over this appears intact, but this could represent deep ulcer  or abscess. There is mild inflammatory change and edema surrounding  the stomach indicating gastritis. The stomach abuts the resection  margin of the pancreas and pancreatitis with inflammation surrounding  the stomach would be unlikely, but cannot be excluded. Endoscopic  ultrasound may be useful for further evaluation. There are a few  prominent to borderline enlarged gastrohepatic lymph nodes present as  well. Subtotal colectomy is noted.     PELVIC ORGANS: No evidence for free fluid or adenopathy in the pelvis.     ADDITIONAL FINDINGS: The aorta is normal in caliber with extensive  atherosclerotic calcification. Several  prominent retroperitoneal lymph  nodes are present.     MUSCULOSKELETAL: Degenerative changes are noted through the spine.                                                                      IMPRESSION:   1.  Diffuse gastric wall thickening has increased since the previous  exam and there is tubular new fluid density along the lesser curvature  of the stomach. Differential would include deep ulcer or abscess,  although the mucosa appears intact overlying this. This has developed  since 9/13/2022. Given the fluid and inflammation surrounding the  stomach and the fact that it abuts the resection margin of the  pancreas, pancreatitis predominantly involving inflammation of the  stomach cannot be excluded, but is considered unlikely. Endoscopic  ultrasound may be useful for further evaluation.  2.  Multiple cysts are again seen throughout both kidneys. One  hemorrhagic cyst versus solid mass arising from the lateral right  kidney is unchanged. MRI could further evaluate if indicated.  3.  Splenectomy and distal pancreatectomy. No evidence for  inflammation of the residual pancreas.     JENNIFER MODI MD        _______________________________________________________________________________   Patient Name: Antoine Russo        Procedure Date: 10/26/2022 10:02 AM   MRN: [id number]                       YOB: 1945   Admit Type: Inpatient                 Age: 76   Gender: Male                          Attending MD: NEPTALI ALFONSO MD   Total Sedation Time:                     _______________________________________________________________________________       Procedure:             Upper GI endoscopy   Indications:           Epigastric abdominal pain, Abnormal CT of the GI tract   Providers:             NEPTALI ALFONSO MD   Referring MD:             Medicines:             See the Anesthesia note for documentation of the                          administered medications   Complications:          No immediate complications.   _______________________________________________________________________________   Procedure:             After obtaining informed consent, the endoscope was                          passed under direct vision. Throughout the procedure,                          the patient's blood pressure, pulse, and oxygen                          saturations were monitored continuously. The Endoscope                          was introduced through the mouth, and advanced to the                          second part of duodenum. The upper GI endoscopy was                          accomplished without difficulty. The patient tolerated                          the procedure well.                                                                                     Findings:        LA Grade A (one or more mucosal breaks less than 5 mm, not extending        between tops of 2 mucosal folds) esophagitis with no bleeding was found        37 to 40 cm from the incisors.        Red blood was found in the gastric antrum.        Diffuse severely erythematous mucosa with bleeding was found in the        stomach. Biopsies were taken with a cold forceps for Helicobacter pylori        testing.        Diffuse mildly erythematous mucosa without active bleeding and with no        stigmata of bleeding was found in the ampulla, in the duodenal bulb, in        the first portion of the duodenum, in the second portion of the        duodenum, at the major papilla, at the minor papilla and in the area of        the papilla.                                                                                     Impression:            - LA Grade A reflux esophagitis with no bleeding.                          - Red blood in the gastric antrum.                          - Erythematous mucosa in the stomach. Biopsied.                          - Erythematous duodenopathy.   Recommendation:        - Your EGD went well. I did take a  biopsy of your                          stomach. There was an area that was bleeding where the                          gastric wall looked thick and after tryingto place a                          clip on this the first one fell off and then the next                          one stayed on but looked like there was pus coming                          from this swollen area. I was not able to stop the                          bleeding and feel that you will need more advanced                          procedure than we can do here. I could not see an                          ulcer due to the clot that is in your stomach. But the                          ct scan suggests a fluid filled area in the stomach                          wall.                          The fluid that drained out of it loked like pus.                          I did not do any more biopsies as I do not want to                          start more bleeding. The biopsy site in the antrum did                          stop bleeding after a short time.                          I have ordered 2 units of fresh frozen plasma to                          reverese your coumadin. to hopefully allow this to                          clot.                                                                                       _________________________   NEPTALI ALFONSO MD   10/26/2022 11:33:12 AM         Current Outpatient Medications   Medication Sig Dispense Refill     atorvastatin (LIPITOR) 80 MG tablet Take 1 tablet (80 mg) by mouth daily 30 tablet 3     clopidogrel (PLAVIX) 75 MG tablet Take 1 tablet (75 mg) by mouth daily Start taking medication the day after the procedure. 90 tablet 1     Continuous Blood Gluc Sensor (FREESTYLE LISA 14 DAY SENSOR) MISC Change every 14 days. 6 each 3     empagliflozin (JARDIANCE) 10 MG TABS tablet Take 1 tablet (10 mg) by mouth daily 30 tablet 3     insulin NPH (NOVOLIN N FLEXPEN RELION) 100 UNIT/ML injection Take 40  units of N in the am and 32 units in the pm. 30 mL 5     insulin pen needle (BD LUIS U/F) 32G X 4 MM miscellaneous USE PEN NEEDLE ONCE DAILY AS DIRECTED 60 each 0     lisinopril (ZESTRIL) 10 MG tablet Take 1 tablet (10 mg) by mouth daily 90 tablet 3     metoprolol succinate ER (TOPROL XL) 50 MG 24 hr tablet TAKE 1 & 1/2 (ONE & ONE-HALF) TABLETS BY MOUTH TWICE DAILY 270 tablet 1     multivitamin, therapeutic with minerals (THERA-VIT-M) TABS Take 1 tablet by mouth daily. 30 each 1     pantoprazole (PROTONIX) 40 MG EC tablet Take 1 tablet (40 mg) by mouth daily 60 tablet 0     pregabalin (LYRICA) 150 MG capsule Take 1 capsule by mouth twice daily 60 capsule 5     tamsulosin (FLOMAX) 0.4 MG capsule Take 1 capsule by mouth once daily 90 capsule 1     vitamin B-12 (CYANOCOBALAMIN) 100 MCG tablet Take 100 mcg by mouth daily       Vitamin D3 (CHOLECALCIFEROL) 25 mcg (1000 units) tablet Take 1 tablet by mouth daily       warfarin ANTICOAGULANT (COUMADIN) 2.5 MG tablet TAKE 2.5 mg X 2 (5mg ) daily until seen  BY ANTICOAGULATION CLINIC. 70 tablet 1     ASPIRIN NOT PRESCRIBED (INTENTIONAL) Please choose reason not prescribed from choices below. (Patient not taking: Reported on 12/12/2022)

## 2023-01-03 NOTE — DISCHARGE SUMMARY
"Refill requested for:      Name from pharmacy: SERTRALINE  MG TABLET          Will file in chart as: sertraline (ZOLOFT) 100 MG tablet    Sig: TAKE 1 TABLET BY MOUTH EVERY DAY    Disp:  90 tablet Â Â  Refills:  0 (Pharmacy requested: Not specified)    Start: 1/3/2023    Class: Eprescribe    Last ordered: 3 months ago by ALYSSA Durham Last refill: 9/27/2022    Rx #: [id number]    SSRI (Selective Serotonin Reuptake Inhibitors) Adult Refill Protocol - 12 Month Protocol Failed 01/03/2023 12:10 AM   Protocol Details  Seen by prescribing provider or same department within the last 12 months or has a future appt in 3 months - IF FAILED PLEASE LOOK AT CHART REVIEW FOR LAST VISIT AND PROCEED ACCORDINGLY    Medication (including dose and sig) on current meds list      To be filled at: [facility] [address] office visit:  12/1/2022    Pending office visit: None   Last refill:   9/27/2022    Per appt note on 12/1/2022:   ""Plan:  Â   1: Vyvanse 30 mg daily and Zoloft 150 mg daily. \""    Last refill of sertraline (ZOLOFT) 50 MG tablet 7/21/2021. Routed to provider on call     Medication can not be filled by protocol. Provider authorization required.   Please advise on refill  Thank you   " [facility]  Hospitalist Discharge Summary      Date of Admission:  8/4/2020  Date of Discharge:  8/7/2020 11:40 AM  Discharging Provider: Citlalli Mejias, DO      Discharge Diagnoses   Acute pancreatitis      Follow-ups Needed After Discharge   Follow-up Appointments     Follow-up and recommended labs and tests       Follow up with primary care provider, Katie Gross, within 7 days   for hospital follow- up.  No follow up labs or test are needed.             Unresulted Labs Ordered in the Past 30 Days of this Admission     No orders found from 7/5/2020 to 8/5/2020.        Discharge Disposition   Discharged to home  Condition at discharge: Stable      Hospital Course   Antoine Russo is a 74 year old male with recurrent pancreatitis, BPH, CAD, history of mouth cancer, diabetes-2,  Chronic low back pain, EtOH abuse, admitted on 8/4/2020 for acute pancreatitis.     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  He had a pancreatic duct leak following this.  He had a pancreatic biliary sphincterotomy and aspiration of the pseudocyst in March 2019 that was followed with some episodes of severe pancreatitis.   - He was last seen by Dr. Beasley in July 2019 at that time he felt if he would have another episode of pancreatitis he could be hospitalized here at [facility] and once it is resolved he would need a repeat secretin MRCP to assess for recurrent leak.  - The ER provider did speak with on-call hepatology on admission and they requested that we admit the patient and treat him conservatively here.  - He does continue to drink some  alcohol.  He was seen as an outpatient on 6/14/2020 for pancreatitis.  He was not admitted at that time.  - not needing opiates for abdomina pain - he was using primarily for back pain 24 hours prior to discharge.  Tolerating regular diet w/out nausea or pain    Hypertension with current elevated blood pressure readings  Continue metoprolol and lisinopril.   - Use labetalol IV as needed elevated high blood pressure     Gastric wall thickening noted on CT scan  Patient might need an EGD   - He will need referral back to hepatic GI after this hospitalization.     Leukocytosis -r esolved  Admit white blood cell count 13.5.   - Likely SIRS related-no overt infection found.   - Patient remain afebrile     Paroxysmal atrial fibrillation/chronic anticoagulation/over anticoagulation  Admit INR 4.23-pharmacy to manage.   - Currently in sinus rhythm.     Hyperlipidemia  Continue Crestor     BPH  Continue Flomax     Diabetes mellitus type 2  Recent HgbA1c 6.3   - Continue NPH insulin and use medium sliding scale   - Pt has a functioning continuous glucose sensor in place-ok to use this in place of at least some glucometer readings.     Alcohol abuse syndrome  CIWA order set, IV vitamins.  He reports fairly modest alcohol intake recently.     Coronary artery disease  Cath showed moderate coronary disease in 2009, no stents placed     Previous Clostridium difficile enterocolitis  Previous subtotal colectomy and eventual re anastomosis  Apparently had a previous partial colectomy due to complications from Clostridium difficile.  He later had a re-anastomosis following ileostomy.     Recent lumbar discectomy  Done this last year at [place]     Spleen absent  This was removed at the time of his partial pancreatectomy.     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       Citlalli Mejias DO  [facility]  ______________________________________________________________________    Physical Exam   Vital Signs: Temp: 99.2  F (37.3  C) Temp src: Oral BP: 137/61 Pulse: 95   Resp: 18 SpO2: 91 % O2 Device: None (Room air)    Weight: 206 lbs 12.66 oz    Gen: alert, in no distress, elderly  Eyes: conjunctiva clear.  Neck: supple, no lymphadenopathy   CV: RRR S1 and S2 normal, no murmurs. Radial and pedal pulses symmetric and normal  Respiratory: Lungs clear bilaterally  Abd: Soft, non-tender.  Normal bowel sounds.    Lymph: No peripheral edema  MSK: moves all extremities equally.  Skin: no significant rashes.          Primary Care Physician   Katie Gross    Discharge Orders      Reason for your hospital stay    Pancreatitis     Follow-up and recommended labs and tests     Follow up with primary care provider, Katie Gross, within 7 days for hospital follow- up.  No follow up labs or test are needed.     Activity    Your activity upon discharge: activity as tolerated     Diet    Follow this diet upon discharge: Orders Placed This Encounter      Regular Diet Adult       Significant Results and Procedures   Most Recent 3 CBC's:  Recent Labs   Lab Test 08/07/20 0425 08/06/20  0855 08/05/20  0429   WBC 13.6* 12.0* 10.6   HGB 11.6* 11.5* 12.8*   MCV 97 98 97    228 249     Most Recent 3 BMP's:  Recent Labs   Lab Test 08/07/20  0425 08/06/20  0747 08/05/20  0429    137 139   POTASSIUM 4.1 4.4 4.3   CHLORIDE 106 107 107   CO2 25 26 30   BUN 10 9 13   CR 0.89 0.78 0.84   ANIONGAP 4 4 2*   NILSON 8.1* 7.9* 8.6   * 150* 105*   ,   Results for orders placed or performed during the hospital encounter of 08/04/20   Abd/pelvis CT, IV contrast only TRAUMA  / AAA    Narrative     CT ABDOMEN AND PELVIS WITH CONTRAST  8/4/2020 11:03 AM    CLINICAL HISTORY:  Epigastric and left upper quadrant abdominal pain,  vomiting, elevated lipase, history of pancreatitis.    TECHNIQUE: CT scan of the abdomen and pelvis was performed following  injection of IV contrast. Multiplanar reformats were obtained. Dose  reduction techniques were used.  CONTRAST: 97 mL Isovue-370    COMPARISON: 6/14/2020 and 5/22/2019.    FINDINGS:   LOWER CHEST: Calcified granuloma is noted in left lower lobe.    HEPATOBILIARY: Normal.    PANCREAS: Surgical clips are seen in the mid pancreas consistent with  distal pancreatectomy. Inflammatory change and edema surrounding the  remaining pancreas have nearly completely resolved. No pancreatic  ductal dilation or mass. 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    SPLEEN: The spleen is absent.    ADRENAL GLANDS: Normal.    KIDNEYS/BLADDER: Both kidneys show multiple areas of scarring and  multiple small cortical cysts. No follow-up necessary. No evidence of  hydronephrosis or enhancing mass through either kidney. The bladder is  unremarkable.    BOWEL: Diffuse gastric wall thickening is similar to the previous  exam. Small amount of fluid surrounds the entire stomach, also similar  to the previous study. No evidence for free air to suggest  perforation. The small bowel is normal in appearance and caliber. No  evidence for obstruction. Subtotal colectomy is noted with ileocolic  anastomosis at the distal sigmoid.    PELVIC ORGANS: Normal.    ADDITIONAL FINDINGS: Extensive atherosclerotic calcification is seen  through the normal caliber aorta and iliac vessels. No evidence for  adenopathy through the abdomen and pelvis.    MUSCULOSKELETAL: Degenerative changes are noted through the lumbar  spine.      Impression    IMPRESSION:    1.  Near complete resolution of edema and inflammatory change  surrounding the pancreas. Stable appearance of distal pancreatectomy.  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  2.  Stable diffuse gastric wall thickening with small amount of fluid  surrounding the stomach also similar to the previous exam. No evidence  of free air to suggest perforation.  3.  Otherwise, no evidence for fluid collection or inflammatory change  through the abdomen and pelvis.  4.  Subtotal colectomy with ileocolic anastomosis at the distal  sigmoid.    JENNIFER MODI MD       Discharge Medications   Current Discharge Medication List      CONTINUE these medications which have NOT CHANGED    Details   Continuous Blood Gluc  (FREESTYLE LISA 14 DAY READER) JOSE ALBERTO 1 each as needed (use to scan blood sugars from sensor)  Qty: 1 Device, Refills: 0    Associated Diagnoses: Type 2 diabetes mellitus with diabetic polyneuropathy, without long-term current use of insulin (H)      Continuous Blood Gluc Sensor (FREESTYLE LISA 14 DAY SENSOR) MISC 1 each every 14 days  Qty: 2 each, Refills: 11    Associated Diagnoses: Type 2 diabetes mellitus with diabetic polyneuropathy, without long-term current use of insulin (H)      insulin isophane human (NOVOLIN N FLEXPEN RELION) 100 UNIT/ML injection Take 34 units of N in the am and 32 units in the pm.  Qty: 30 mL, Refills: 5    Associated Diagnoses: Type 2 diabetes mellitus with diabetic polyneuropathy, without long-term current use of insulin (H)      insulin pen needle (BD LUIS U/F) 32G X 4 MM miscellaneous USE PEN NEEDLE ONCE DAILY AS DIRECTED  Qty: 60 each, Refills: 0    Associated Diagnoses: Type 2 diabetes mellitus with diabetic polyneuropathy, without long-term current use of insulin (H)      lisinopril (PRINIVIL/ZESTRIL) 5 MG tablet Take 1 tablet (5 mg) by mouth daily  Qty: 90 tablet, Refills: 3    Comments:  Fill when needed  Associated Diagnoses: Hypertension, benign essential, goal below 140/90      metoprolol succinate ER (TOPROL-XL) 50 MG 24 hr tablet TAKE 1 & 1/2 (ONE & ONE-HALF) TABLETS BY MOUTH TWICE DAILY  Qty: 270 tablet, Refills: 0    Associated Diagnoses: Hypertension, benign essential, goal below 140/90      multivitamin, therapeutic with minerals (THERA-VIT-M) TABS Take 1 tablet by mouth daily.  Qty: 30 each, Refills: 1    Associated Diagnoses: Surgery aftercare      ondansetron (ZOFRAN ODT) 4 MG ODT tab Take 1 tablet (4 mg) by mouth every 8 hours as needed for nausea  Qty: 10 tablet, Refills: 0      rosuvastatin (CRESTOR) 10 MG tablet Take 1 tablet (10 mg) by mouth daily  Qty: 90 tablet, Refills: 3    Associated Diagnoses: Hyperlipidemia LDL goal <100      tamsulosin (FLOMAX) 0.4 MG capsule TAKE 1 CAPSULE BY MOUTH ONCE DAILY -  NEEDS  BP  RECHECK  NOW  Qty: 90 capsule, Refills: 1    Associated Diagnoses: Urgency of urination; Hypertrophy of prostate without urinary obstruction      vitamin B-12 (CYANOCOBALAMIN) 100 MCG tablet Take 100 mcg by mouth daily      warfarin ANTICOAGULANT (COUMADIN) 2.5 MG tablet TAKE 1/2 TABLET (1.25MG)  EVERY MONDAY, 1 TABLET BY MOUTH ALL OTHER DAYS OR AS DIRECTED BY THE ANTICOAGULATION CLINIC.  Qty: 85 tablet, Refills: 0    Associated Diagnoses: Chronic atrial fibrillation (H)      ASPIRIN NOT PRESCRIBED (INTENTIONAL) Antiplatelet medication not prescribed intentionally due to Current anticoagulant therapy (warfarin/enoxaparin)      oxyCODONE (ROXICODONE) 5 MG tablet Take 1 tablet (5 mg) by mouth every 6 hours as needed for pain  Qty: 2 tablet, Refills: 0           Allergies   Allergies   Allergen Reactions     Nkda [No Known Drug Allergies]

## 2023-01-04 ENCOUNTER — LAB (OUTPATIENT)
Dept: LAB | Facility: CLINIC | Age: 78
End: 2023-01-04
Payer: COMMERCIAL

## 2023-01-04 ENCOUNTER — ANTICOAGULATION THERAPY VISIT (OUTPATIENT)
Dept: ANTICOAGULATION | Facility: CLINIC | Age: 78
End: 2023-01-04

## 2023-01-04 DIAGNOSIS — Z79.01 LONG TERM CURRENT USE OF ANTICOAGULANT THERAPY: ICD-10-CM

## 2023-01-04 DIAGNOSIS — Z79.01 LONG TERM CURRENT USE OF ANTICOAGULANT THERAPY: Chronic | ICD-10-CM

## 2023-01-04 DIAGNOSIS — I48.20 CHRONIC ATRIAL FIBRILLATION (H): ICD-10-CM

## 2023-01-04 DIAGNOSIS — I48.20 CHRONIC ATRIAL FIBRILLATION (H): Primary | Chronic | ICD-10-CM

## 2023-01-04 LAB — INR BLD: 2.2 (ref 0.9–1.1)

## 2023-01-04 PROCEDURE — 85610 PROTHROMBIN TIME: CPT

## 2023-01-04 PROCEDURE — 36416 COLLJ CAPILLARY BLOOD SPEC: CPT

## 2023-01-04 NOTE — PROGRESS NOTES
ANTICOAGULATION MANAGEMENT     Antoine Russo 77 year old male is on warfarin with therapeutic INR result. (Goal INR 2.0-3.0)    Recent labs: (last 7 days)     01/04/23  0721   INR 2.2*       ASSESSMENT       Source(s): Chart Review and Patient/Caregiver Call       Warfarin doses taken: Warfarin taken as instructed    Diet: No new diet changes identified    New illness, injury, or hospitalization: No    Medication/supplement changes: None noted    Signs or symptoms of bleeding or clotting: No    Previous INR: Subtherapeutic    Additional findings: None       PLAN     Recommended plan for no diet, medication or health factor changes affecting INR     Dosing Instructions: Continue your current warfarin dose with next INR in 3 weeks       Summary  As of 1/4/2023    Full warfarin instructions:  1.25 mg every Mon, Wed, Fri; 2.5 mg all other days   Next INR check:  1/24/2023             Telephone call with Poli or Saleem who verbalizes understanding and agrees to plan    Check at provider office visit    Education provided:     Please call back if any changes to your diet, medications or how you've been taking warfarin    Contact [phone]  with any changes, questions or concerns.     Plan made per [facility] anticoagulation protocol    Angela DO RN  Anticoagulation Clinic  1/4/2023    _______________________________________________________________________     Anticoagulation Episode Summary     Current INR goal:  2.0-3.0   TTR:  69.9 % (11.7 mo)   Target end date:  Indefinite   Send INR reminders to:  [facility]    Indications    Atrial fibrillation (H) [I48.91]  Long term current use of anticoagulant therapy [Z79.01]           Comments:           Anticoagulation Care Providers     Provider Role Specialty Phone number    Katie Martino MD Referring Family Medicine [phone]    Brianna Matute APRN CNP Referring Family Medicine [phone]

## 2023-01-06 DIAGNOSIS — I48.20 CHRONIC ATRIAL FIBRILLATION (H): ICD-10-CM

## 2023-01-09 RX ORDER — WARFARIN SODIUM 2.5 MG/1
TABLET ORAL
Qty: 80 TABLET | Refills: 1 | Status: SHIPPED | OUTPATIENT
Start: 2023-01-09 | End: 2023-05-11

## 2023-01-09 NOTE — TELEPHONE ENCOUNTER
ANTICOAGULATION MANAGEMENT:  Medication Refill    Anticoagulation Summary  As of 1/4/2023    Warfarin maintenance plan:  1.25 mg (2.5 mg x 0.5) every Mon, Wed, Fri; 2.5 mg (2.5 mg x 1) all other days   Next INR check:  1/24/2023   Target end date:  Indefinite    Indications    Atrial fibrillation (H) [I48.91]  Long term current use of anticoagulant therapy [Z79.01]             Anticoagulation Care Providers     Provider Role Specialty Phone number    Katie Martino MD Referring Family Medicine [phone]    Brianna Matute APRN CNP Referring Family Medicine [phone]          Visit with referring provider/group within last year: Yes    [facility] referral signed within last year: Yes    Antoine meets all criteria for refill (current [facility] referral, office visit with referring provider/group in last year, lab monitoring up to date or not exceeding 2 weeks overdue). Rx instructions and quantity supplied updated to match patient's current dosing plan. Warfarin 90 day supply with 1 refill granted per [facility] protocol     Madhavi Sanders RN  Anticoagulation Clinic

## 2023-01-10 DIAGNOSIS — I73.9 PVD (PERIPHERAL VASCULAR DISEASE) (H): ICD-10-CM

## 2023-01-19 NOTE — PROGRESS NOTES
[facility]    Hospitalist Progress Note    Date of Service (when I saw the patient): 08/05/2020    Assessment & Plan   Antoine Russo is a 74 year old male admitted on 8/4/2020. He presented with abdominal pain and was thought to have pancreatitis.     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  He had a pancreatic duct leak following this.  He had a pancreatic biliary sphincterotomy and aspiration of the pseudocyst in March 2019 that was followed with some episodes of severe pancreatitis.   - He was last seen by Dr. Beasley in July 2019 at that time he felt if he would have another episode of pancreatitis he could be hospitalized here at [facility] and once it is resolved he would need a repeat secretin MRCP to assess for recurrent leak.  - The ER provider did speak with on-call hepatology today and they requested that we admit the patient and treat him conservatively here.  - He does continue to drink some alcohol.  He was seen as an outpatient on 6/14/2020 for pancreatitis.  He was not admitted at that time.   - Patient is admitted, made n.p.o. and will be hydrated with lactated Ringer's.   - Lipase 1317 -> 700   - Patient continues to require IV pain medications   - Keep NPO for now     Hypertension with current elevated blood pressure readings  Continue metoprolol and lisinopril.   - Use labetalol IV as needed elevated high blood pressure     Gastric wall thickening noted on CT scan  Patient might need an EGD   - He will need referral back to hepatic GI after this hospitalization.     Leukocytosis  Admit white blood cell count  13.5.   - Likely SIRS related-no overt infection found.   - Patient remain afebrile   - WBC 13.5 -> 10.6     Paroxysmal atrial fibrillation/chronic anticoagulation/over anticoagulation  Admit INR 4.23-pharmacy to manage.   - Currently in sinus rhythm.     Hyperlipidemia  Continue Crestor     BPH  Continue Flomax     Diabetes mellitus type 2  Recent HgbA1c 6.3   - Continue NPH insulin and use medium sliding scale   - Pt has a functioning continuous glucose sensor in place-ok to use this in place of at least some glucometer readings.     Alcohol abuse syndrome  CIWA order set, IV vitamins.  He reports fairly modest alcohol intake recently.     Coronary artery disease  Cath showed moderate coronary disease in 2009, no stents placed     Previous Clostridium difficile enterocolitis  Previous subtotal colectomy and eventual re anastomosis  Apparently had a previous partial colectomy due to complications from Clostridium difficile.  He later had a re-anastomosis following ileostomy.     Recent lumbar discectomy  Done this last year at [facility] absent  This was removed at the time of his partial pancreatectomy.     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    Yassine Wright    Interval History   Patient continues to have nausea and abdominal pain, only slightly improved from admission.  Denies chest pain or dyspnea.    -Data reviewed today: I reviewed all new labs and imaging results over the last 24 hours. I personally reviewed no images or EKG's today.    Physical Exam   Temp: 97.5  F (36.4  C) Temp src: Oral BP: 133/52 Pulse: 70   Resp: 16 SpO2: 93 % O2 Device: None (Room air)    Vitals:    08/04/20 0945 08/04/20 1756 08/05/20 0506   Weight: 90.7 kg (200 lb) 93.3 kg (205 lb 11 oz) 93.3 kg (205 lb 11 oz)     Vital  Signs with Ranges  Temp:  [97.2  F (36.2  C)-97.5  F (36.4  C)] 97.5  F (36.4  C)  Pulse:  [66-92] 70  Resp:  [16-18] 16  BP: (123-204)/(47-92) 133/52  SpO2:  [93 %-95 %] 93 %  I/O last 3 completed shifts:  In: -   Out: 100 [Emesis/NG output:100]    Gen: Well nourished, well developed, alert and oriented x 3, appears flushed.  HEENT: Atraumatic, normocephalic; sclera non-injected, anicterric; oral mucosa moist, no lesion, no exudate  Lungs: Clear to ausculation, no wheezes, no rhonchi, no rales  Heart: Regular rate, regular rhythm, no gallops, no rubs, no murmurs  GI: Bowel sound normal, no hepatosplenomegaly, no masses, non-tender, non-distended, no guarding, no rebound tenderness  Lymph: No lymphadenopathy, no edema  Skin: No rashes, no chronic venous stasis     Medications     dextrose 5% and 0.9% NaCl 200 mL/hr at 08/05/20 0655     lactated ringers 200 mL/hr at 08/05/20 0508     - MEDICATION INSTRUCTIONS -       Warfarin Therapy Reminder         vitamin B-12  100 mcg Oral Daily     insulin aspart  1-6 Units Subcutaneous Q4H     insulin isophane human  32 Units Subcutaneous QPM     insulin isophane human  34 Units Subcutaneous QAM AC     lisinopril  5 mg Oral Daily     metoprolol succinate ER  75 mg Oral BID     multivitamin w/minerals  1 tablet Oral Daily     rosuvastatin  10 mg Oral Daily     sodium chloride (PF)  3 mL Intracatheter Q8H     IV Fluid with vitamins   Intravenous Q24H     tamsulosin  0.4 mg Oral Daily     warfarin-No DOSE today  1 each Does not apply no dose today (warfarin)       Data   Recent Labs   Lab 08/05/20  0429 08/04/20  1255 08/04/20  0957   WBC 10.6  --  13.5*   HGB 12.8*  --  14.6   MCV 97  --  96     --  300   INR 3.73*  --  4.23*    137 136   POTASSIUM 4.3 4.9 5.9*   CHLORIDE 107 105 106   CO2 30 29 28   BUN 13 15 16   CR 0.84 0.68 0.74   ANIONGAP 2* 3 2*   NILSON 8.6 8.9 9.3   * 161* 186*   ALBUMIN 2.8*  --  3.4   PROTTOTAL 6.8  --  8.3   BILITOTAL 0.8  --  0.6    ALKPHOS 49  --  62   ALT 19  --  27   AST 24  --  39   LIPASE 700*  --  1,317*       No results found for this or any previous visit (from the past 24 hour(s)).     Alert and oriented, no focal deficits, no motor or sensory deficits.

## 2023-01-22 DIAGNOSIS — E11.40 TYPE 2 DIABETES MELLITUS WITH DIABETIC NEUROPATHY, WITH LONG-TERM CURRENT USE OF INSULIN (H): ICD-10-CM

## 2023-01-22 DIAGNOSIS — Z79.4 TYPE 2 DIABETES MELLITUS WITH DIABETIC NEUROPATHY, WITH LONG-TERM CURRENT USE OF INSULIN (H): ICD-10-CM

## 2023-01-23 RX ORDER — PREGABALIN 150 MG/1
CAPSULE ORAL
Qty: 60 CAPSULE | Refills: 5 | Status: SHIPPED | OUTPATIENT
Start: 2023-01-23 | End: 2023-08-03

## 2023-01-24 ENCOUNTER — OFFICE VISIT (OUTPATIENT)
Dept: FAMILY MEDICINE | Facility: CLINIC | Age: 78
End: 2023-01-24
Payer: COMMERCIAL

## 2023-01-24 ENCOUNTER — ANTICOAGULATION THERAPY VISIT (OUTPATIENT)
Dept: ANTICOAGULATION | Facility: CLINIC | Age: 78
End: 2023-01-24

## 2023-01-24 VITALS
HEART RATE: 88 BPM | SYSTOLIC BLOOD PRESSURE: 122 MMHG | DIASTOLIC BLOOD PRESSURE: 64 MMHG | OXYGEN SATURATION: 95 % | RESPIRATION RATE: 20 BRPM | WEIGHT: 200 LBS | BODY MASS INDEX: 29.62 KG/M2 | HEIGHT: 69 IN | TEMPERATURE: 97.4 F

## 2023-01-24 DIAGNOSIS — I73.9 PERIPHERAL VASCULAR DISEASE (H): ICD-10-CM

## 2023-01-24 DIAGNOSIS — C04.0 MALIGNANT NEOPLASM OF ANTERIOR PORTION OF FLOOR OF MOUTH (H): ICD-10-CM

## 2023-01-24 DIAGNOSIS — I48.20 CHRONIC ATRIAL FIBRILLATION (H): ICD-10-CM

## 2023-01-24 DIAGNOSIS — Z79.01 LONG TERM CURRENT USE OF ANTICOAGULANT THERAPY: ICD-10-CM

## 2023-01-24 DIAGNOSIS — E11.42 TYPE 2 DIABETES MELLITUS WITH DIABETIC POLYNEUROPATHY, WITHOUT LONG-TERM CURRENT USE OF INSULIN (H): Primary | ICD-10-CM

## 2023-01-24 DIAGNOSIS — T14.8XXA MUSCLE STRAIN: ICD-10-CM

## 2023-01-24 DIAGNOSIS — Z79.01 LONG TERM CURRENT USE OF ANTICOAGULANT THERAPY: Chronic | ICD-10-CM

## 2023-01-24 DIAGNOSIS — I48.20 CHRONIC ATRIAL FIBRILLATION (H): Primary | Chronic | ICD-10-CM

## 2023-01-24 DIAGNOSIS — F10.20 ALCOHOL DEPENDENCE, UNCOMPLICATED (H): ICD-10-CM

## 2023-01-24 DIAGNOSIS — L82.0 INFLAMED SEBORRHEIC KERATOSIS: ICD-10-CM

## 2023-01-24 DIAGNOSIS — I10 HYPERTENSION, BENIGN ESSENTIAL, GOAL BELOW 140/90: ICD-10-CM

## 2023-01-24 LAB
ANION GAP SERPL CALCULATED.3IONS-SCNC: 9 MMOL/L (ref 7–15)
BUN SERPL-MCNC: 17 MG/DL (ref 8–23)
CALCIUM SERPL-MCNC: 9.5 MG/DL (ref 8.8–10.2)
CHLORIDE SERPL-SCNC: 102 MMOL/L (ref 98–107)
CREAT SERPL-MCNC: 0.91 MG/DL (ref 0.67–1.17)
DEPRECATED HCO3 PLAS-SCNC: 26 MMOL/L (ref 22–29)
GFR SERPL CREATININE-BSD FRML MDRD: 88 ML/MIN/1.73M2
GLUCOSE SERPL-MCNC: 218 MG/DL (ref 70–99)
HBA1C MFR BLD: 7.4 % (ref 0–5.6)
HOLD SPECIMEN: NORMAL
INR BLD: 1.7 (ref 0.9–1.1)
POTASSIUM SERPL-SCNC: 4.9 MMOL/L (ref 3.4–5.3)
SODIUM SERPL-SCNC: 137 MMOL/L (ref 136–145)

## 2023-01-24 PROCEDURE — 17110 DESTRUCTION B9 LES UP TO 14: CPT | Performed by: FAMILY MEDICINE

## 2023-01-24 PROCEDURE — 36415 COLL VENOUS BLD VENIPUNCTURE: CPT | Performed by: FAMILY MEDICINE

## 2023-01-24 PROCEDURE — 85610 PROTHROMBIN TIME: CPT | Performed by: FAMILY MEDICINE

## 2023-01-24 PROCEDURE — 83036 HEMOGLOBIN GLYCOSYLATED A1C: CPT | Performed by: FAMILY MEDICINE

## 2023-01-24 PROCEDURE — 80048 BASIC METABOLIC PNL TOTAL CA: CPT | Performed by: FAMILY MEDICINE

## 2023-01-24 PROCEDURE — 99215 OFFICE O/P EST HI 40 MIN: CPT | Mod: 25 | Performed by: FAMILY MEDICINE

## 2023-01-24 ASSESSMENT — PAIN SCALES - GENERAL: PAINLEVEL: NO PAIN (0)

## 2023-01-24 NOTE — PROGRESS NOTES
ANTICOAGULATION MANAGEMENT     Antoine Russo 77 year old male is on warfarin with subtherapeutic INR result. (Goal INR 2.0-3.0)    Recent labs: (last 7 days)     01/24/23  0705   INR 1.7*       ASSESSMENT       Source(s): Chart Review and Patient/Caregiver Call       Warfarin doses taken: Less warfarin taken than planned which may be affecting INR-patient went back to former maintenance dose of 12.5 mg weekly.    Diet: No new diet changes identified    New illness, injury, or hospitalization: No    Medication/supplement changes: Patient stopped both Jardiance and Protonix months ago    Signs or symptoms of bleeding or clotting: No    Previous INR: Therapeutic last visit; previously outside of goal range    Additional findings: None       PLAN     Recommended plan for temporary change(s) affecting INR     Dosing Instructions: Continue your current warfarin dose with next INR in 2 weeks       Summary  As of 1/24/2023    Full warfarin instructions:  1.25 mg every Mon, Wed, Fri; 2.5 mg all other days   Next INR check:  2/7/2023             Telephone call with Poli or Saleem who verbalizes understanding and agrees to plan    Lab visit scheduled    Education provided:     Taking warfarin: importance of following [facility] instructions vs instructions on the prescription bottle and Importance of taking warfarin as instructed    Contact [phone]  with any changes, questions or concerns.     Plan made per [facility] anticoagulation protocol    Maria Victoria Castaneda RN  Anticoagulation Clinic  1/24/2023    _______________________________________________________________________     Anticoagulation Episode Summary     Current INR goal:  2.0-3.0   TTR:  66.5 % (11.7 mo)   Target end date:  Indefinite   Send INR reminders to:  [facility]    Indications    Atrial fibrillation (H) [I48.91]  Long term current use of anticoagulant therapy [Z79.01]           Comments:           Anticoagulation Care Providers     Provider Role  Specialty Phone number    Katie Martino MD Referring Family Medicine [phone]    Brianna Matute APRN CNP Referring Family Medicine [phone]

## 2023-01-24 NOTE — PROGRESS NOTES
"  Assessment & Plan     Type 2 diabetes mellitus with diabetic polyneuropathy, without long-term current use of insulin (H)  Controlled  Continue NPH at current dose  - REVIEW OF HEALTH MAINTENANCE PROTOCOL ORDERS  - Hemoglobin A1c; Future  - Hemoglobin A1c  - Basic metabolic panel  (Ca, Cl, CO2, Creat, Gluc, K, Na, BUN); Future  - Basic metabolic panel  (Ca, Cl, CO2, Creat, Gluc, K, Na, BUN)    Hypertension, benign essential, goal below 140/90  Well controlled  Continue lisinopril, metoprolol    Chronic atrial fibrillation (H)  Rate controlled with metoprolol  Continue warfarin  - INR point of care    Long term current use of anticoagulant therapy  Continue warfarin  - INR point of care    Alcohol dependence, uncomplicated (H)  Keep alcohol to minimum    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   Time spent doing chart review, history and exam, documentation and further activities per the note   BMI:   Estimated body mass index is 29.53 kg/m  as calculated from the following:    Height as of this encounter: 1.753 m (5' 9\").    Weight as of this encounter: 90.7 kg (200 lb).     Blood sugar testing frequency justification:  Adjustment of medication(s)      Return in about 3 months (around 4/24/2023) for diabetes.    Katie Yoder MD  [facility]    Terri Monge is a 77 year old, presenting for the following health issues:  Diabetes      History of Present Illness       Diabetes:   He presents for follow up of diabetes.  He is checking home blood glucose two times daily. He checks blood glucose before meals.  Blood glucose is never over 200 " "and never under 70. When his blood glucose is low, the patient is asymptomatic for confusion, blurred vision, lethargy and reports not feeling dizzy, shaky, or weak.  He has no concerns regarding his diabetes at this time.  He is having numbness in feet and burning in feet. The patient has not had a diabetic eye exam in the last 12 months.         Hypertension: He presents for follow up of hypertension.  He does not check blood pressure  regularly outside of the clinic. Outpatient blood pressures have not been over 140/90. He follows a low salt diet.       Diabetes  Couldn't afford the Jardiance, did take 3 months, but hasn't had for 6 weeks  On NPH 40/32  Lab Results   Component Value Date    A1C 7.4 01/24/2023    A1C 8.2 10/20/2022    A1C 9.2 07/19/2022    A1C 9.1 04/19/2022    A1C 9.3 01/18/2022    A1C 7.1 10/03/2020    A1C 6.3 05/12/2020    A1C 6.5 02/04/2020    A1C 9.0 10/17/2019    A1C 9.7 07/18/2019        Hypertension  On lisinopril, metoprolol  BP Readings from Last 6 Encounters:   01/24/23 122/64   12/12/22 115/69   11/30/22 104/63   11/03/22 (!) 148/69   10/22/22 139/87   10/20/22 139/82        Left shoulder pain  Was shoveling, has pain in upper back now    afib  On metoprolol, warfarin    Had a right extremity angiogram with SFA intervention  On aspirin and statin    Review of Systems   ROS: 5 point ROS negative except as noted above in HPI, including Gen., Resp., CV, GI &  system review.       Objective    /64 (BP Location: Right arm)   Pulse 88   Temp 97.4  F (36.3  C) (Tympanic)   Resp 20   Ht 1.753 m (5' 9\")   Wt 90.7 kg (200 lb)   SpO2 95%   BMI 29.53 kg/m    Body mass index is 29.53 kg/m .  Physical Exam   GENERAL: healthy, alert and no distress  NECK: no adenopathy, no asymmetry, masses, or scars and thyroid normal to palpation  RESP: lungs clear to auscultation - no rales, rhonchi or wheezes  CV: regular rate and rhythm, normal S1 S2, no S3 or S4, no murmur, click or rub, no " peripheral edema   ABDOMEN: soft, nontender, multiple scars, no hepatosplenomegaly, no masses and bowel sounds normal  MS: no gross musculoskeletal defects noted, no edema  Shoulder: Symmetric without erythema, ecchymoses, warmth, or edema. No tenderness to palpation around shoulder joint. There no pain with internal or external rotation.  No pain with flexion, extension, abduction or adduction. There is no weakness in any rotator cuffs. EXT: pulses intact. Sensation to light touch intact.   There is tenderness in the upper left thoracic muscles, no pain along thoracic spine, normal range of motion of neck  Skin: multiple seborrheic keratoses, one on right side is red, raised and irritated 6-7 mm in size

## 2023-01-24 NOTE — NURSING NOTE
"Chief Complaint   Patient presents with     Diabetes       Initial /64 (BP Location: Right arm)   Pulse 88   Temp 97.4  F (36.3  C) (Tympanic)   Resp 20   Ht 1.753 m (5' 9\")   Wt 90.7 kg (200 lb)   SpO2 95%   BMI 29.53 kg/m   Estimated body mass index is 29.53 kg/m  as calculated from the following:    Height as of this encounter: 1.753 m (5' 9\").    Weight as of this encounter: 90.7 kg (200 lb).    Patient presents to the clinic using No DME    Is there anyone who you would like to be able to receive your results? No  If yes have patient fill out VIDYA    "

## 2023-02-07 ENCOUNTER — ANTICOAGULATION THERAPY VISIT (OUTPATIENT)
Dept: ANTICOAGULATION | Facility: CLINIC | Age: 78
End: 2023-02-07

## 2023-02-07 ENCOUNTER — LAB (OUTPATIENT)
Dept: LAB | Facility: CLINIC | Age: 78
End: 2023-02-07
Payer: COMMERCIAL

## 2023-02-07 DIAGNOSIS — Z79.01 LONG TERM CURRENT USE OF ANTICOAGULANT THERAPY: ICD-10-CM

## 2023-02-07 DIAGNOSIS — Z79.01 LONG TERM CURRENT USE OF ANTICOAGULANT THERAPY: Chronic | ICD-10-CM

## 2023-02-07 DIAGNOSIS — I48.91 ATRIAL FIBRILLATION (H): Primary | Chronic | ICD-10-CM

## 2023-02-07 DIAGNOSIS — I48.20 CHRONIC ATRIAL FIBRILLATION (H): ICD-10-CM

## 2023-02-07 LAB — INR BLD: 1.9 (ref 0.9–1.1)

## 2023-02-07 PROCEDURE — 36416 COLLJ CAPILLARY BLOOD SPEC: CPT

## 2023-02-07 PROCEDURE — 85610 PROTHROMBIN TIME: CPT

## 2023-02-07 NOTE — PROGRESS NOTES
ANTICOAGULATION MANAGEMENT     Antoine Russo 77 year old male is on warfarin with subtherapeutic INR result. (Goal INR 2.0-3.0)    Recent labs: (last 7 days)     02/07/23  0659   INR 1.9*       ASSESSMENT       Source(s): Chart Review and Patient/Caregiver Call       Warfarin doses taken: Warfarin taken as instructed    Diet: No new diet changes identified    New illness, injury, or hospitalization: No    Medication/supplement changes: None noted    Signs or symptoms of bleeding or clotting: No    Previous INR: Subtherapeutic resulting in a dose increase    Additional findings: None       PLAN     Recommended plan for no diet, medication or health factor changes affecting INR     Dosing Instructions: Increase your warfarin dose (9.1% change) with next INR in 2 weeks       Summary  As of 2/7/2023    Full warfarin instructions:  1.25 mg every Mon, Fri; 2.5 mg all other days   Next INR check:  2/21/2023             Telephone call with Poli or Saleem who verbalizes understanding and agrees to plan    Lab visit scheduled    Education provided:     Please call back if any changes to your diet, medications or how you've been taking warfarin    Contact [phone]  with any changes, questions or concerns.     Plan made per [facility] anticoagulation protocol    Aparna Kunz RN  Anticoagulation Clinic  2/7/2023    _______________________________________________________________________     Anticoagulation Episode Summary     Current INR goal:  2.0-3.0   TTR:  62.5 % (11.7 mo)   Target end date:  Indefinite   Send INR reminders to:  [facility]    Indications    Atrial fibrillation (H) [I48.91]  Long term current use of anticoagulant therapy [Z79.01]           Comments:           Anticoagulation Care Providers     Provider Role Specialty Phone number    Katie Martino MD Referring Family Medicine [phone]    Brianna Matute APRN CNP Referring Family Medicine [phone]             Amelia Lora returns in follow-up November 16, 2017 for her COPD. She also has an acute bronchitis right now. She continues on her Spiriva once a day and p.r.n. albuterol. For the most part she has been doing okay from a breathing standpoint but over the last 2 weeks she has been very tired and has been coughing with thick dark gray to green sputum. The sputum is hard to raise. The cough can disturb her sleep. She also has sweats and diarrhea and nausea. She denies any vomiting. She has been wheezing. She has not had any sick contacts. She is using her albuterol more. Before last week she was using her albuterol about 3 times a week and getting short of breath just when she was exercising.     On physical examination she was pleasant and alert in no distress.  Blood pressure 170, pulse 64, respiratory rate of 18. Oxygen saturation was 98% on room air rest and remained 95% with walking. She weighed 152.3 kg.   Chest was normal to palpation and percussion. There is no use of accessory respiratory muscles and chest expansion was equal. Lungs were clear to auscultation. There is no wheezing even with forced expiration. Cardiac exam had a regular rate and rhythm without murmur.     Impression  1. COPD. She'll continue her current regimen. I talked her about using albuterol before any exercise.  2. Acute bronchitis. Z-Capo was prescribed. I told her to call if symptoms don't improve.  I will see her back in 6 months.

## 2023-02-09 DIAGNOSIS — N40.0 HYPERTROPHY OF PROSTATE WITHOUT URINARY OBSTRUCTION: ICD-10-CM

## 2023-02-09 DIAGNOSIS — R39.15 URGENCY OF URINATION: ICD-10-CM

## 2023-02-09 RX ORDER — TAMSULOSIN HYDROCHLORIDE 0.4 MG/1
CAPSULE ORAL
Qty: 90 CAPSULE | Refills: 2 | Status: SHIPPED | OUTPATIENT
Start: 2023-02-09 | End: 2023-11-06

## 2023-02-18 DIAGNOSIS — R80.1 PERSISTENT PROTEINURIA: ICD-10-CM

## 2023-02-18 DIAGNOSIS — N18.2 CHRONIC KIDNEY DISEASE, STAGE 2 (MILD): ICD-10-CM

## 2023-02-18 DIAGNOSIS — E11.40 TYPE 2 DIABETES MELLITUS WITH DIABETIC NEUROPATHY, WITH LONG-TERM CURRENT USE OF INSULIN (H): ICD-10-CM

## 2023-02-18 DIAGNOSIS — Z79.4 TYPE 2 DIABETES MELLITUS WITH DIABETIC NEUROPATHY, WITH LONG-TERM CURRENT USE OF INSULIN (H): ICD-10-CM

## 2023-02-18 DIAGNOSIS — I10 HYPERTENSION, BENIGN ESSENTIAL, GOAL BELOW 140/90: ICD-10-CM

## 2023-02-20 RX ORDER — LISINOPRIL 10 MG/1
TABLET ORAL
Qty: 90 TABLET | Refills: 3 | Status: SHIPPED | OUTPATIENT
Start: 2023-02-20 | End: 2023-11-20

## 2023-02-21 ENCOUNTER — LAB (OUTPATIENT)
Dept: LAB | Facility: CLINIC | Age: 78
End: 2023-02-21
Payer: COMMERCIAL

## 2023-02-21 ENCOUNTER — ANTICOAGULATION THERAPY VISIT (OUTPATIENT)
Dept: ANTICOAGULATION | Facility: CLINIC | Age: 78
End: 2023-02-21

## 2023-02-21 DIAGNOSIS — I48.20 CHRONIC ATRIAL FIBRILLATION (H): ICD-10-CM

## 2023-02-21 DIAGNOSIS — Z79.01 LONG TERM CURRENT USE OF ANTICOAGULANT THERAPY: Chronic | ICD-10-CM

## 2023-02-21 DIAGNOSIS — I48.20 CHRONIC ATRIAL FIBRILLATION (H): Primary | Chronic | ICD-10-CM

## 2023-02-21 DIAGNOSIS — Z79.01 LONG TERM CURRENT USE OF ANTICOAGULANT THERAPY: ICD-10-CM

## 2023-02-21 LAB — INR BLD: 3.1 (ref 0.9–1.1)

## 2023-02-21 PROCEDURE — 36416 COLLJ CAPILLARY BLOOD SPEC: CPT

## 2023-02-21 PROCEDURE — 85610 PROTHROMBIN TIME: CPT

## 2023-02-21 NOTE — PROGRESS NOTES
ANTICOAGULATION MANAGEMENT     Antoine Russo 77 year old male is on warfarin with supratherapeutic INR result. (Goal INR 2.0-3.0)    Recent labs: (last 7 days)     02/21/23  1142   INR 3.1*       ASSESSMENT       Source(s): Chart Review and Patient/Caregiver Call       Warfarin doses taken: Warfarin taken as instructed    Diet: No new diet changes identified    New illness, injury, or hospitalization: No    Medication/supplement changes: None noted    Signs or symptoms of bleeding or clotting: No    Previous INR: Subtherapeutic    Additional findings: None       PLAN     Recommended plan for no diet, medication or health factor changes affecting INR     Dosing Instructions: decrease your warfarin dose (8.3% change) with next INR in 2 weeks       Summary  As of 2/21/2023    Full warfarin instructions:  1.25 mg every Mon, Fri; 2.5 mg all other days   Next INR check:  3/7/2023             Telephone call with Poli or Saleem who verbalizes understanding and agrees to plan    Lab visit scheduled    Education provided:     Goal range and lab monitoring: goal range and significance of current result    Plan made per [facility] anticoagulation protocol    Jazz Key RN  Anticoagulation Clinic  2/21/2023    _______________________________________________________________________     Anticoagulation Episode Summary     Current INR goal:  2.0-3.0   TTR:  61.9 % (11.7 mo)   Target end date:  Indefinite   Send INR reminders to:  [facility]    Indications    Atrial fibrillation (H) [I48.91]  Long term current use of anticoagulant therapy [Z79.01]           Comments:           Anticoagulation Care Providers     Provider Role Specialty Phone number    Katie Martino MD Referring Family Medicine [phone]    Brianna Matute APRN CNP Referring Family Medicine [phone]

## 2023-03-09 ENCOUNTER — ANTICOAGULATION THERAPY VISIT (OUTPATIENT)
Dept: ANTICOAGULATION | Facility: CLINIC | Age: 78
End: 2023-03-09

## 2023-03-09 ENCOUNTER — LAB (OUTPATIENT)
Dept: LAB | Facility: CLINIC | Age: 78
End: 2023-03-09
Payer: COMMERCIAL

## 2023-03-09 DIAGNOSIS — Z79.01 LONG TERM CURRENT USE OF ANTICOAGULANT THERAPY: ICD-10-CM

## 2023-03-09 DIAGNOSIS — I48.20 CHRONIC ATRIAL FIBRILLATION (H): Primary | Chronic | ICD-10-CM

## 2023-03-09 DIAGNOSIS — I48.20 CHRONIC ATRIAL FIBRILLATION (H): ICD-10-CM

## 2023-03-09 DIAGNOSIS — Z79.01 LONG TERM CURRENT USE OF ANTICOAGULANT THERAPY: Chronic | ICD-10-CM

## 2023-03-09 LAB — INR BLD: 1.9 (ref 0.9–1.1)

## 2023-03-09 PROCEDURE — 85610 PROTHROMBIN TIME: CPT

## 2023-03-09 PROCEDURE — 36416 COLLJ CAPILLARY BLOOD SPEC: CPT

## 2023-03-09 NOTE — PROGRESS NOTES
ANTICOAGULATION MANAGEMENT     Antoine Russo 77 year old male is on warfarin with subtherapeutic INR result. (Goal INR 2.0-3.0)    Recent labs: (last 7 days)     03/09/23  0723   INR 1.9*       ASSESSMENT       Source(s): Chart Review and Patient/Caregiver Call       Warfarin doses taken: Less warfarin taken than planned which may be affecting INR    Diet: No new diet changes identified    New illness, injury, or hospitalization: No    Medication/supplement changes: None noted    Signs or symptoms of bleeding or clotting: No    Previous INR: Supratherapeutic    Additional findings: None     PLAN     Recommended plan for temporary change(s) affecting INR     Dosing Instructions: Discussed dosing in detail - take set maintenance dose with next INR in 2 weeks       Summary  As of 3/9/2023    Full warfarin instructions:  1.25 mg every Mon, Wed, Fri; 2.5 mg all other days   Next INR check:  3/23/2023             Telephone call with Poli or Saleem who verbalizes understanding and agrees to plan    Lab visit scheduled    Education provided:     Please call back if any changes to your diet, medications or how you've been taking warfarin    Symptom monitoring: monitoring for clotting signs and symptoms    Plan made per [facility]agulation protocol    Madhavi Sanders RN  Anticoagulation Clinic  3/9/2023    _______________________________________________________________________     Anticoagulation Episode Summary     Current INR goal:  2.0-3.0   TTR:  61.1 % (11.7 mo)   Target end date:  Indefinite   Send INR reminders to:  [facility]    Indications    Atrial fibrillation (H) [I48.91]  Long term current use of anticoagulant therapy [Z79.01]           Comments:           Anticoagulation Care Providers     Provider Role Specialty Phone number    Katie Martino MD Referring Family Medicine [phone]    Brianna Matute APRN CNP Referring Family Medicine [phone]

## 2023-03-20 ENCOUNTER — ANTICOAGULATION THERAPY VISIT (OUTPATIENT)
Dept: ANTICOAGULATION | Facility: CLINIC | Age: 78
End: 2023-03-20

## 2023-03-20 ENCOUNTER — LAB (OUTPATIENT)
Dept: LAB | Facility: CLINIC | Age: 78
End: 2023-03-20
Payer: COMMERCIAL

## 2023-03-20 DIAGNOSIS — I48.20 CHRONIC ATRIAL FIBRILLATION (H): ICD-10-CM

## 2023-03-20 DIAGNOSIS — Z79.01 LONG TERM CURRENT USE OF ANTICOAGULANT THERAPY: Chronic | ICD-10-CM

## 2023-03-20 DIAGNOSIS — I48.20 CHRONIC ATRIAL FIBRILLATION (H): Primary | Chronic | ICD-10-CM

## 2023-03-20 DIAGNOSIS — Z79.01 LONG TERM CURRENT USE OF ANTICOAGULANT THERAPY: ICD-10-CM

## 2023-03-20 LAB — INR BLD: 2 (ref 0.9–1.1)

## 2023-03-20 PROCEDURE — 36416 COLLJ CAPILLARY BLOOD SPEC: CPT

## 2023-03-20 PROCEDURE — 85610 PROTHROMBIN TIME: CPT

## 2023-03-20 NOTE — PROGRESS NOTES
ANTICOAGULATION MANAGEMENT     Antoine Russo 77 year old male is on warfarin with therapeutic INR result. (Goal INR 2.0-3.0)    Recent labs: (last 7 days)     03/20/23  0725   INR 2.0*       ASSESSMENT       Source(s): Chart Review and Patient/Caregiver Call       Warfarin doses taken: Warfarin taken as instructed (pt did miss yesterdays dose, but he will take it this morning. Pt does normally take warfarin in the evenings, but he is going to switch to taking it in the mornings)    Diet: No new diet changes identified    New illness, injury, or hospitalization: No    Medication/supplement changes: None noted    Signs or symptoms of bleeding or clotting: No    Previous INR: Subtherapeutic    Additional findings: None     PLAN     Recommended plan for no diet, medication or health factor changes affecting INR     Dosing Instructions: (pt will take 3.75 mg today, as patient did miss his dose last night) Continue your current warfarin dose with next INR in 3 weeks       Summary  As of 3/20/2023    Full warfarin instructions:  3/20: 3.75 mg; Otherwise 1.25 mg every Mon, Wed, Fri; 2.5 mg all other days   Next INR check:  4/11/2023             Telephone call with Poli or Saleem who verbalizes understanding and agrees to plan    Lab visit scheduled    Education provided:     Please call back if any changes to your diet, medications or how you've been taking warfarin    Plan made per [facility]agulation protocol    Madhavi Sanders RN  Anticoagulation Clinic  3/20/2023    _______________________________________________________________________     Anticoagulation Episode Summary     Current INR goal:  2.0-3.0   TTR:  58.0 % (11.7 mo)   Target end date:  Indefinite   Send INR reminders to:  [facility]    Indications    Atrial fibrillation (H) [I48.91]  Long term current use of anticoagulant therapy [Z79.01]           Comments:           Anticoagulation Care Providers     Provider Role Specialty Phone number     Katie Martino MD Referring Family Medicine [phone]    Brianna Matute APRN [facility] Referring Family Medicine [phone]

## 2023-04-03 PROBLEM — K85.90 ACUTE PANCREATITIS WITHOUT NECROSIS OR INFECTION, UNSPECIFIED: Chronic | Status: ACTIVE | Noted: 2018-03-30

## 2023-04-10 RX ORDER — CLOPIDOGREL BISULFATE 75 MG/1
TABLET ORAL
Qty: 90 TABLET | Refills: 0 | OUTPATIENT
Start: 2023-04-10

## 2023-04-11 ENCOUNTER — DOCUMENTATION ONLY (OUTPATIENT)
Dept: ANTICOAGULATION | Facility: CLINIC | Age: 78
End: 2023-04-11

## 2023-04-11 ENCOUNTER — LAB (OUTPATIENT)
Dept: LAB | Facility: CLINIC | Age: 78
End: 2023-04-11
Payer: COMMERCIAL

## 2023-04-11 ENCOUNTER — ANTICOAGULATION THERAPY VISIT (OUTPATIENT)
Dept: ANTICOAGULATION | Facility: CLINIC | Age: 78
End: 2023-04-11

## 2023-04-11 DIAGNOSIS — I48.20 CHRONIC ATRIAL FIBRILLATION (H): ICD-10-CM

## 2023-04-11 DIAGNOSIS — Z79.01 LONG TERM CURRENT USE OF ANTICOAGULANT THERAPY: ICD-10-CM

## 2023-04-11 DIAGNOSIS — I48.91 ATRIAL FIBRILLATION (H): Primary | Chronic | ICD-10-CM

## 2023-04-11 DIAGNOSIS — I48.91 ATRIAL FIBRILLATION (H): Primary | ICD-10-CM

## 2023-04-11 DIAGNOSIS — Z79.01 LONG TERM CURRENT USE OF ANTICOAGULANT THERAPY: Chronic | ICD-10-CM

## 2023-04-11 LAB — INR BLD: 2.5 (ref 0.9–1.1)

## 2023-04-11 PROCEDURE — 85610 PROTHROMBIN TIME: CPT

## 2023-04-11 PROCEDURE — 36416 COLLJ CAPILLARY BLOOD SPEC: CPT

## 2023-04-11 NOTE — PROGRESS NOTES
ANTICOAGULATION CLINIC REFERRAL RENEWAL REQUEST       An annual renewal order is required for all patients referred to [facility].?  Please review and sign the pended referral order for Antoine Russo.       ANTICOAGULATION SUMMARY      Warfarin indication(s)   Atrial Fibrillation    Mechanical heart valve present?  NO       Current goal range   INR: 2.0-3.0     Goal appropriate for indication? Goal INR 2-3, standard for indication(s) above     Time in Therapeutic Range (TTR)  (Goal > 60%) 58%       Office visit with referring provider's group within last year yes on 1-       Aparna Kunz RN  [facility]

## 2023-04-11 NOTE — PROGRESS NOTES
ANTICOAGULATION MANAGEMENT     Antoine Russo 77 year old male is on warfarin with therapeutic INR result. (Goal INR 2.0-3.0)    Recent labs: (last 7 days)     04/11/23  0721   INR 2.5*       ASSESSMENT       Source(s): Chart Review    Previous INR was Therapeutic last visit; previously outside of goal range    Medication, diet, health changes since last INR: unable to fully assess patient as he had another call coming in. He stated no changes and writer will send in [employer] as well.             PLAN     Recommended plan for no diet, medication or health factor changes affecting INR     Dosing Instructions: Continue your current warfarin dose with next INR in 4 weeks       Summary  As of 4/11/2023    Full warfarin instructions:  1.25 mg every Mon, Wed, Fri; 2.5 mg all other days   Next INR check:  5/9/2023             Telephone call with Poli or Saleem who verbalizes understanding and agrees to plan  Sent [email or web address] message with dosing and follow up instructions    Contact [phone]  to schedule and with any changes, questions or concerns.     Education provided:     Please call back if any changes to your diet, medications or how you've been taking warfarin    Contact [phone]  with any changes, questions or concerns.     Plan made per [facility]agulation protocol    Aparna Kunz RN  Anticoagulation Clinic  4/11/2023    _______________________________________________________________________     Anticoagulation Episode Summary     Current INR goal:  2.0-3.0   TTR:  58.0 % (11.7 mo)   Target end date:  Indefinite   Send INR reminders to:  [facility]    Indications    Atrial fibrillation (H) [I48.91]  Long term current use of anticoagulant therapy [Z79.01]           Comments:           Anticoagulation Care Providers     Provider Role Specialty Phone number    Katie Martino MD Referring Family Medicine [phone]    Brianna Matute APRN CNP Referring Family Medicine [phone]

## 2023-04-18 DIAGNOSIS — I10 HYPERTENSION, BENIGN ESSENTIAL, GOAL BELOW 140/90: ICD-10-CM

## 2023-04-18 RX ORDER — METOPROLOL SUCCINATE 50 MG/1
TABLET, EXTENDED RELEASE ORAL
Qty: 270 TABLET | Refills: 1 | Status: SHIPPED | OUTPATIENT
Start: 2023-04-18 | End: 2023-10-30

## 2023-04-19 NOTE — DISCHARGE INSTRUCTIONS
Spoke with Dj with [facility]. They still have not heard from patient. Have not been able to start processing order because of this. Phoned Jose Luis. States she has attempted to reach out and phone number provided, has not heard back. Gave additional main phone number: [phone]. States she will call. Warfarin discharge instructions:    Continue warfarin 2.5mg every evening.    Keep the appointment that you have scheduled with anticoagulation clinic for 11/29/18 for your INR check.

## 2023-04-26 RX ORDER — ONDANSETRON 2 MG/ML
4 INJECTION INTRAMUSCULAR; INTRAVENOUS EVERY 6 HOURS PRN
Status: CANCELLED
Start: 2023-04-26

## 2023-05-11 ENCOUNTER — ANTICOAGULATION THERAPY VISIT (OUTPATIENT)
Dept: ANTICOAGULATION | Facility: CLINIC | Age: 78
End: 2023-05-11

## 2023-05-11 ENCOUNTER — OFFICE VISIT (OUTPATIENT)
Dept: FAMILY MEDICINE | Facility: CLINIC | Age: 78
End: 2023-05-11
Payer: COMMERCIAL

## 2023-05-11 VITALS
HEART RATE: 68 BPM | DIASTOLIC BLOOD PRESSURE: 72 MMHG | SYSTOLIC BLOOD PRESSURE: 108 MMHG | WEIGHT: 205 LBS | TEMPERATURE: 97.6 F | RESPIRATION RATE: 20 BRPM | BODY MASS INDEX: 30.36 KG/M2 | HEIGHT: 69 IN | OXYGEN SATURATION: 91 %

## 2023-05-11 DIAGNOSIS — E78.5 HYPERLIPIDEMIA LDL GOAL <100: ICD-10-CM

## 2023-05-11 DIAGNOSIS — I10 HYPERTENSION, BENIGN ESSENTIAL, GOAL BELOW 140/90: ICD-10-CM

## 2023-05-11 DIAGNOSIS — Z79.4 TYPE 2 DIABETES MELLITUS WITH DIABETIC NEUROPATHY, WITH LONG-TERM CURRENT USE OF INSULIN (H): Primary | ICD-10-CM

## 2023-05-11 DIAGNOSIS — I48.20 CHRONIC ATRIAL FIBRILLATION (H): Primary | Chronic | ICD-10-CM

## 2023-05-11 DIAGNOSIS — E11.40 TYPE 2 DIABETES MELLITUS WITH DIABETIC NEUROPATHY, WITH LONG-TERM CURRENT USE OF INSULIN (H): Primary | ICD-10-CM

## 2023-05-11 DIAGNOSIS — I73.9 PVD (PERIPHERAL VASCULAR DISEASE) (H): ICD-10-CM

## 2023-05-11 DIAGNOSIS — I48.20 CHRONIC ATRIAL FIBRILLATION (H): ICD-10-CM

## 2023-05-11 DIAGNOSIS — Z79.01 LONG TERM CURRENT USE OF ANTICOAGULANT THERAPY: Chronic | ICD-10-CM

## 2023-05-11 DIAGNOSIS — Z79.01 LONG TERM CURRENT USE OF ANTICOAGULANT THERAPY: ICD-10-CM

## 2023-05-11 LAB
ALBUMIN SERPL BCG-MCNC: 4 G/DL (ref 3.5–5.2)
ALP SERPL-CCNC: 72 U/L (ref 40–129)
ALT SERPL W P-5'-P-CCNC: 24 U/L (ref 10–50)
ANION GAP SERPL CALCULATED.3IONS-SCNC: 8 MMOL/L (ref 7–15)
AST SERPL W P-5'-P-CCNC: 26 U/L (ref 10–50)
BILIRUB SERPL-MCNC: 1.2 MG/DL
BUN SERPL-MCNC: 15.5 MG/DL (ref 8–23)
CALCIUM SERPL-MCNC: 9.9 MG/DL (ref 8.8–10.2)
CHLORIDE SERPL-SCNC: 104 MMOL/L (ref 98–107)
CREAT SERPL-MCNC: 0.99 MG/DL (ref 0.67–1.17)
DEPRECATED HCO3 PLAS-SCNC: 30 MMOL/L (ref 22–29)
GFR SERPL CREATININE-BSD FRML MDRD: 79 ML/MIN/1.73M2
GLUCOSE SERPL-MCNC: 159 MG/DL (ref 70–99)
HBA1C MFR BLD: 8.7 % (ref 0–5.6)
INR PPP: 2.06 (ref 0.85–1.15)
POTASSIUM SERPL-SCNC: 4.6 MMOL/L (ref 3.4–5.3)
PROT SERPL-MCNC: 7.4 G/DL (ref 6.4–8.3)
SODIUM SERPL-SCNC: 142 MMOL/L (ref 136–145)

## 2023-05-11 PROCEDURE — 83036 HEMOGLOBIN GLYCOSYLATED A1C: CPT | Performed by: FAMILY MEDICINE

## 2023-05-11 PROCEDURE — 99215 OFFICE O/P EST HI 40 MIN: CPT | Performed by: FAMILY MEDICINE

## 2023-05-11 PROCEDURE — 80053 COMPREHEN METABOLIC PANEL: CPT | Performed by: FAMILY MEDICINE

## 2023-05-11 PROCEDURE — 36415 COLL VENOUS BLD VENIPUNCTURE: CPT | Performed by: FAMILY MEDICINE

## 2023-05-11 PROCEDURE — 85610 PROTHROMBIN TIME: CPT | Performed by: FAMILY MEDICINE

## 2023-05-11 RX ORDER — CLOPIDOGREL BISULFATE 75 MG/1
75 TABLET ORAL DAILY
Qty: 90 TABLET | Refills: 3 | Status: CANCELLED | OUTPATIENT
Start: 2023-05-11

## 2023-05-11 RX ORDER — WARFARIN SODIUM 2.5 MG/1
TABLET ORAL
Qty: 80 TABLET | Refills: 1 | Status: SHIPPED | OUTPATIENT
Start: 2023-05-11 | End: 2024-01-22

## 2023-05-11 RX ORDER — ATORVASTATIN CALCIUM 80 MG/1
80 TABLET, FILM COATED ORAL DAILY
Qty: 90 TABLET | Refills: 3 | Status: SHIPPED | OUTPATIENT
Start: 2023-05-11 | End: 2024-07-06

## 2023-05-11 NOTE — PROGRESS NOTES
ANTICOAGULATION MANAGEMENT     Antoine Russo 77 year old male is on warfarin with therapeutic INR result. (Goal INR 2.0-3.0)    Recent labs: (last 7 days)     05/11/23  0855   INR 2.06*       ASSESSMENT       Source(s): Chart Review and Patient/Caregiver Call       Warfarin doses taken: Warfarin taken as instructed    Diet: No new diet changes identified    Medication/supplement changes: None noted    New illness, injury, or hospitalization: No    Signs or symptoms of bleeding or clotting: No    Previous result: Therapeutic last 2(+) visits    Additional findings: None         PLAN     Recommended plan for no diet, medication or health factor changes affecting INR     Dosing Instructions: Continue your current warfarin dose with next INR in 5 weeks       Summary  As of 5/11/2023    Full warfarin instructions:  1.25 mg every Mon, Wed, Fri; 2.5 mg all other days   Next INR check:  6/15/2023             Telephone call with Poli or Saleem who verbalizes understanding and agrees to plan    Lab visit scheduled    Education provided:     Please call back if any changes to your diet, medications or how you've been taking warfarin    Contact [phone]  with any changes, questions or concerns.     Plan made per [facility] anticoagulation protocol    Angela DO RN  Anticoagulation Clinic  5/11/2023    _______________________________________________________________________     Anticoagulation Episode Summary     Current INR goal:  2.0-3.0   TTR:  60.8 % (11.7 mo)   Target end date:  Indefinite   Send INR reminders to:  [facility]    Indications    Atrial fibrillation (H) [I48.91]  Long term current use of anticoagulant therapy [Z79.01]  Chronic atrial fibrillation (H) [I48.20]           Comments:           Anticoagulation Care Providers     Provider Role Specialty Phone number    Katie Martino MD Referring Family Medicine [phone]    Brianna Matute APRN CNP Referring Family Medicine [phone]

## 2023-05-11 NOTE — NURSING NOTE
"Chief Complaint   Patient presents with     Diabetes       Initial /72   Pulse 68   Temp 97.6  F (36.4  C) (Tympanic)   Resp 20   Ht 1.753 m (5' 9\")   Wt 93 kg (205 lb)   SpO2 91%   BMI 30.27 kg/m   Estimated body mass index is 30.27 kg/m  as calculated from the following:    Height as of this encounter: 1.753 m (5' 9\").    Weight as of this encounter: 93 kg (205 lb).    Patient presents to the clinic using No DME    Is there anyone who you would like to be able to receive your results? No  If yes have patient fill out VIDYA    "

## 2023-05-11 NOTE — PROGRESS NOTES
Assessment & Plan     Type 2 diabetes mellitus with diabetic neuropathy, with long-term current use of insulin (H)  Not controlled  See below  - Hemoglobin A1c; Future  - Comprehensive metabolic panel (BMP + Alb, Alk Phos, ALT, AST, Total. Bili, TP); Future  - empagliflozin (JARDIANCE) 10 MG TABS tablet; Take 1 tablet (10 mg) by mouth daily  - Comprehensive metabolic panel (BMP + Alb, Alk Phos, ALT, AST, Total. Bili, TP)  - Hemoglobin A1c  - Continuous Blood Gluc Sensor (FREESTYLE LISA 2 SENSOR) MISC; Change every 14 days.  - Continuous Blood Gluc  (FREESTYLE LISA 2 READER) JOSE ALBERTO; Use to read blood sugars as per 's instructions.  - insulin degludec (TRESIBA) 100 UNIT/ML pen; Inject 40 Units Subcutaneous At Bedtime Increase by 2 units every 4 days until am blood sugars under 120. Max dose 60 units/day. Order pen needles too    PVD (peripheral vascular disease) (H)  Stable  Continue Plavix  - atorvastatin (LIPITOR) 80 MG tablet; Take 1 tablet (80 mg) by mouth daily  - INR; Future  - INR    Chronic atrial fibrillation (H)  Long term current use of anticoagulant therapy  Stable   - warfarin ANTICOAGULANT (COUMADIN) 2.5 MG tablet; Take 1.25 mg every Mon, Wed, Fri; 2.5 mg all other days or As directed by Anticoagulation clinic  - INR; Future  - INR    hyperlipidemia   Continue statin    Hypertension, benign  Well controlled  Continue metoprolol, lisinopril       Patient Instructions   I talked with Ruthann about your insulin    We'll switch to Tresiba once a day, start with 40 units and increase by 2 units every 4 days until blood sugars are under 120  Make apt with Ruthann  I ordered the blood glucose monitor that you don't have to prick you fingers     I spent a total of 40 minutes on the day of the visit.   Time spent by me doing chart review, history and exam, documentation and further activities per the note     Blood sugar testing frequency justification:  Uncontrolled diabetes and Adjustment of  medication(s)      Katie Yoder MD  [facility]    Terri Monge is a 77 year old, presenting for the following health issues:  Diabetes        5/11/2023     8:12 AM   Additional Questions   Roomed by Margarita     History of Present Illness       Diabetes:   He presents for follow up of diabetes.  He is checking home blood glucose two times daily. He checks blood glucose before meals.  Blood glucose is never over 200 and never under 70. He is aware of hypoglycemia symptoms including dizziness and weakness. He has no concerns regarding his diabetes at this time.  He is having numbness in feet and burning in feet. The patient has not had a diabetic eye exam in the last 12 months.         Hyperlipidemia:  He presents for follow up of hyperlipidemia.  He is taking medication to lower cholesterol. He is not having myalgia or other side effects to statin medications.    Hypertension: He presents for follow up of hypertension.  He does not check blood pressure  regularly outside of the clinic. Outpatient blood pressures have not been over 140/90. He does not follow a low salt diet. He consumes 0 sweetened beverage(s) daily. He exercises with enough effort to increase his heart rate 3 or less days per week.   He is taking medications regularly.     Diabetes  Lab Results   Component Value Date    A1C 7.4 01/24/2023    A1C 8.2 10/20/2022    A1C 9.2 07/19/2022    A1C 9.1 04/19/2022    A1C 9.3 01/18/2022    A1C 7.1 10/03/2020    A1C 6.3 05/12/2020    A1C 6.5 02/04/2020    A1C 9.0 10/17/2019    A1C 9.7 07/18/2019     On insulin NPH, Jardiance    Patient has Type 2 diabetes and  is checking blood sugars 4 times daily.  Patient is taking 2 daily injections   A1C not at goal  Patient requires frequent adjustments to their insulin treatment regimen and would benefit greatly from a personal continuous glucose monitoring system.    hypertension   On lisinopril 10 mg, metoprolol 50 mg,   "    peripheral vascular disease   Had a LLE angiogram with treatment of SFA disease.     hyperlipidemia   On statin  Recent Labs   Lab Test 10/20/22  0736 01/18/22  0657 01/18/16  0843 04/14/15  0853   CHOL 137 116   < > 144   HDL 36* 30*   < > 27*   LDL 67 48   < > 76   TRIG 170* 191*   < > 206*   CHOLHDLRATIO  --   --   --  5.3*    < > = values in this interval not displayed.                Review of Systems   ROS: 5 point ROS negative except as noted above in HPI, including Gen., Resp., CV, GI &  system review.       Objective    /72   Pulse 68   Temp 97.6  F (36.4  C) (Tympanic)   Resp 20   Ht 1.753 m (5' 9\")   Wt 93 kg (205 lb)   SpO2 91%   BMI 30.27 kg/m    Body mass index is 30.27 kg/m .  Physical Exam   GENERAL: healthy, alert and no distress  EYES: Eyes grossly normal to inspection, PERRL and conjunctivae and sclerae normal  NECK: no adenopathy, no asymmetry, masses, or scars and thyroid normal to palpation  RESP: lungs clear to auscultation - no rales, rhonchi or wheezes  CV: irregularly irregular rhythm, no murmur, click or rub   ABDOMEN: soft, nontender, no hepatosplenomegaly, no masses and bowel sounds normal  MS: no gross musculoskeletal defects noted, trace edema    Results for orders placed or performed in visit on 05/11/23   Hemoglobin A1c     Status: Abnormal   Result Value Ref Range    Hemoglobin A1C 8.7 (H) 0.0 - 5.6 %                "

## 2023-05-11 NOTE — PATIENT INSTRUCTIONS
I talked with Ruthann about your insulin    We'll switch to Tresiba once a day, start with 40 units and increase by 2 units every 4 days until blood sugars are under 120  Make apt with Ruthann  I ordered the blood glucose monitor that you don't have to prick you fingers

## 2023-05-16 ENCOUNTER — TELEPHONE (OUTPATIENT)
Dept: EDUCATION SERVICES | Facility: CLINIC | Age: 78
End: 2023-05-16
Payer: COMMERCIAL

## 2023-05-16 ENCOUNTER — TELEPHONE (OUTPATIENT)
Dept: FAMILY MEDICINE | Facility: CLINIC | Age: 78
End: 2023-05-16
Payer: COMMERCIAL

## 2023-05-16 DIAGNOSIS — Z79.4 TYPE 2 DIABETES MELLITUS WITH DIABETIC NEUROPATHY, WITH LONG-TERM CURRENT USE OF INSULIN (H): ICD-10-CM

## 2023-05-16 DIAGNOSIS — E11.40 TYPE 2 DIABETES MELLITUS WITH DIABETIC NEUROPATHY, WITH LONG-TERM CURRENT USE OF INSULIN (H): ICD-10-CM

## 2023-05-16 NOTE — TELEPHONE ENCOUNTER
Diabetes education contact:    Called pt to let nakul know of change to Lantus.  The pharmacy filled the Tresiba but told him that he will not get it filled again.  He will use up the Tresiba and then we will transition over to lantus.    His BG is doing good: 109, 127 am readings.  He is not hearing glucose alarms on phone.  Made an appt for him so we can assess, using jake 2 on phone.    Ruthann Alaniz RD, [facility]

## 2023-05-22 ENCOUNTER — ALLIED HEALTH/NURSE VISIT (OUTPATIENT)
Dept: EDUCATION SERVICES | Facility: CLINIC | Age: 78
End: 2023-05-22
Payer: COMMERCIAL

## 2023-05-22 VITALS — WEIGHT: 205 LBS | BODY MASS INDEX: 30.27 KG/M2

## 2023-05-22 DIAGNOSIS — E11.40 TYPE 2 DIABETES MELLITUS WITH DIABETIC NEUROPATHY, WITH LONG-TERM CURRENT USE OF INSULIN (H): Primary | ICD-10-CM

## 2023-05-22 DIAGNOSIS — Z79.4 TYPE 2 DIABETES MELLITUS WITH DIABETIC NEUROPATHY, WITH LONG-TERM CURRENT USE OF INSULIN (H): Primary | ICD-10-CM

## 2023-05-22 PROCEDURE — G0108 DIAB MANAGE TRN  PER INDIV: HCPCS | Performed by: DIETITIAN, REGISTERED

## 2023-05-22 NOTE — PATIENT INSTRUCTIONS
Call me once you have been on the Lantus for one week.  Ruthann [phone]    2.  Keep dose at 40 units.

## 2023-05-22 NOTE — PROGRESS NOTES
Diabetes Self-Management Education & Support    Presents for: Individual review    Type of Service: In Person Visit    Assessment Type:   ASSESSMENT:  -doing good with the Tresiba will have him stay on the 40 units.  He will need to transistion to lantus once tresiba is gone.  -will follow jake  Numbers look good, he went up to 44 units and had some lows, dropped him back to 40 units.  He will let me know once he has been on the lantus for one week.  Patient's most recent   Lab Results   Component Value Date    A1C 8.7 05/11/2023    A1C 7.1 10/03/2020     is not meeting goal of <7.0    Diabetes knowledge and skills assessment:   Patient is knowledgeable in diabetes management concepts related to: Healthy Eating, Being Active, Monitoring and Taking Medication    Continue education with the following diabetes management concepts: Healthy Eating, Being Active, Monitoring, Taking Medication, Problem Solving, Reducing Risks and Healthy Coping    Based on learning assessment above, most appropriate setting for further diabetes education would be: Individual setting.      PLAN  1. Call me once you have been on the Lantus for one week.  Ruthann [phone]    2.  Keep dose at 40 units.    Follow-up: about one month    See Care Plan for co-developed, patient-state behavior change goals.  AVS provided for patient today.    Education Materials Provided:  No new materials provided today      SUBJECTIVE/OBJECTIVE:  Presents for: Individual review  Diabetes education in the past 24mo: No  Diabetes type: Type 2  Disease course: Stable  How confident are you filling out medical forms by yourself:: Extremely  Diabetes management related comments/concerns: no  Cultural Influences/Ethnic Background:  Choose not to answer      Diabetes Symptoms & Complications:  Fatigue: No  Neuropathy: Yes  Polydipsia: Sometimes  Polyphagia: Sometimes  Polyuria: Sometimes  Visual change: No  Slow healing wounds: No  Autonomic neuropathy: No  CVA:  "No  Heart disease: No  Peripheral neuropathy: Yes  Peripheral Vascular Disease: No  Retinopathy: No  Sexual dysfunction: Yes    Patient Problem List and Family Medical History reviewed for relevant medical history, current medical status, and diabetes risk factors.    Vitals:  Wt 93 kg (205 lb)   BMI 30.27 kg/m    Estimated body mass index is 30.27 kg/m  as calculated from the following:    Height as of 5/11/23: 1.753 m (5' 9\").    Weight as of this encounter: 93 kg (205 lb).   Last 3 BP:   BP Readings from Last 3 Encounters:   05/11/23 108/72   01/24/23 122/64   12/12/22 115/69       History   Smoking Status     Former     Packs/day: 1.00     Years: 40.00     Types: Cigarettes     Quit date: 11/24/2006   Smokeless Tobacco     Never       Labs:  Lab Results   Component Value Date    A1C 8.7 05/11/2023    A1C 7.1 10/03/2020     Lab Results   Component Value Date     05/11/2023     11/03/2022     07/19/2022     02/22/2021     Lab Results   Component Value Date    LDL 67 10/20/2022    LDL 54 02/22/2021     HDL Cholesterol   Date Value Ref Range Status   02/22/2021 29 (L) >39 mg/dL Final     Direct Measure HDL   Date Value Ref Range Status   10/20/2022 36 (L) >=40 mg/dL Final   ]  GFR Estimate   Date Value Ref Range Status   05/11/2023 79 >60 mL/min/1.73m2 Final     Comment:     eGFR calculated using 2021 CKD-EPI equation.   02/22/2021 65 >60 mL/min/[1.73_m2] Final     Comment:     Non  GFR Calc  Starting 12/18/2018, serum creatinine based estimated GFR (eGFR) will be   calculated using the Chronic Kidney Disease Epidemiology Collaboration   (CKD-EPI) equation.       GFR, ESTIMATED POCT   Date Value Ref Range Status   09/13/2022 >60 >60 mL/min/1.73m2 Final     GFR Estimate If Black   Date Value Ref Range Status   02/22/2021 76 >60 mL/min/[1.73_m2] Final     Comment:      GFR Calc  Starting 12/18/2018, serum creatinine based estimated GFR (eGFR) will be "   calculated using the Chronic Kidney Disease Epidemiology Collaboration   (CKD-EPI) equation.       Lab Results   Component Value Date    CR 0.99 05/11/2023    CR 1.11 02/22/2021     No results found for: MICROALBUMIN    Healthy Eating:  Healthy Eating Assessed Today: Yes  Cultural/Episcopal diet restrictions?: No  Meal planning/habits: None  How many times a week on average do you eat food made away from home (restaurant/take-out)?: 4  Meals include: Breakfast, Dinner  Breakfast: pizza rolls with ranch dressing  Lunch: dont usually eat lunch  Dinner: sandwich brisqit  Snacks: not a snacker  Beverages: Water, Coffee, Alcohol (beer)    Being Active:  Being Active Assessed Today: Yes (does what he can)  Barrier to exercise: Access    Monitoring:  Blood Glucose Meter: FreeStyle  Times checking blood sugar at home (number): 2  Times checking blood sugar at home (per): Day  Blood glucose trend: Fluctuating        Taking Medications:  Diabetes Medication(s)     Insulin       insulin glargine (LANTUS PEN) 100 UNIT/ML pen    Inject 46 Units Subcutaneous At Bedtime Increase by 2 units every 3 days until blood sugars in am under 120 up to max 60 units daily     insulin NPH (NOVOLIN N FLEXPEN RELION) 100 UNIT/ML injection    Take 40 units of N in the am and 32 units in the pm.    Sodium-Glucose Co-Transporter 2 (SGLT2) Inhibitors       empagliflozin (JARDIANCE) 10 MG TABS tablet    Take 1 tablet (10 mg) by mouth daily          Current Treatments: Insulin Injections  Dose schedule: At bedtime  Given by: Patient  Injection/Infusion sites: Abdomen    Problem Solving:                 Reducing Risks:  CAD Risks: Diabetes Mellitus  Has dilated eye exam at least once a year?: No  Sees dentist every 6 months?: No  Feet checked by healthcare provider in the last year?: No    Healthy Coping:  Informal Support system:: None  Patient Activation Measure Survey Score:      2/15/2011     9:00 AM   RIDDHI Score (Last Two)   RIDDHI Raw Score 41    Activation Score 63.2   RIDDHI Level 3         Care Plan and Education Provided:  Care Plan: Diabetes   Updates made by Ruthann Alaniz RD since 5/22/2023 12:00 AM      Problem: HbA1C Not In Goal       Goal: Establish Regular Follow-Ups with PCP       Task: Discuss with PCP the recommended timing for patient's next follow up visit(s)    Responsible User: Ruthann Alaniz RD      Task: Discuss schedule for PCP visits with patient    Responsible User: Ruthann Alaniz RD      Goal: Get HbA1C Level in Goal       Task: Educate patient on diabetes education self-management topics    Responsible User: Ruthann Alaniz RD      Task: Educate patient on benefits of regular glucose monitoring    Responsible User: Ruthann Alaniz RD      Task: Refer patient to appropriate extended care team member, as needed (Medication Therapy Management, Behavioral Health, Physical Therapy, etc.)    Responsible User: Ruthann Alaniz RD      Task: Discuss diabetes treatment plan with patient    Responsible User: Ruthann Alaniz RD      Problem: Diabetes Self-Management Education Needed to Optimize Self-Care Behaviors       Goal: Understand diabetes pathophysiology and disease progression       Task: Provide education on diabetes pathophysiology and disease progression specfic to patient's diabetes type    Responsible User: Ruthann Alaniz RD      Goal: Healthy Eating - follow a healthy eating pattern for diabetes       Task: Provide education on portion control and consistency in amount, composition and timing of food intake    Responsible User: Ruthann Alaniz RD      Task: Provide education on managing carbohydrate intake (carbohydrate counting, plate planning method, etc.)    Responsible User: Ruthann Alaniz RD      Task: Provide education on weight management    Responsible User: Ruthann Alaniz RD      Task: Provide education on heart healthy eating    Responsible User: Ruthann Alaniz RD      Task: Provide education on eating out    Responsible User: Ruthann Alaniz RD       Task: Develop individualized healthy eating plan with patient    Responsible User: Ruthann Alaniz RD      Goal: Being Active - get regular physical activity, working up to at least 150 minutes per week       Task: Provide education on relationship of activity to glucose and precautions to take if at risk for low glucose    Responsible User: Ruthann Alaniz RD      Task: Discuss barriers to physical activity with patient    Responsible User: Ruthann Alaniz RD      Task: Develop physical activity plan with patient    Responsible User: Ruthann Alaniz RD      Task: Explore community resources including walking groups, assistance programs, and home videos    Responsible User: Ruthann Alaniz RD      Goal: Monitoring - monitor glucose and ketones as directed       Task: Provide education on blood glucose monitoring (purpose, proper technique, frequency, glucose targets, interpreting results, when to use glucose control solution, sharps disposal)    Responsible User: Ruthann Alaniz RD      Task: Provide education on continuous glucose monitoring (sensor placement, use of wendi or /reader, understanding glucose trends, alerts and alarms, differences between sensor glucose and blood glucose)    Responsible User: Ruthann Alaniz RD      Task: Provide education on ketone monitoring (when to monitor, frequency, etc.)    Responsible User: Ruthann Alaniz RD      Goal: Taking Medication - patient is consistently taking medications as directed    This Visit's Progress: 0%   Note:    Once tresiba is gone start lantus, in one week after starting lantus call me.     Task: Provide education on action of prescribed medication, including when to take and possible side effects    Responsible User: Ruthann Alaniz RD      Task: Provide education on insulin and injectable diabetes medications, including administration, storage, site selection and rotation for injection sites    Responsible User: Ruthann Alaniz RD      Task: Discuss barriers to medication  adherence with patient and provide management technique ideas as appropriate    Responsible User: Ruthann Alaniz RD      Task: Provide education on frequency and refill details of medications    Responsible User: Ruthann Alaniz RD      Goal: Problem Solving - know how to prevent and manage short-term diabetes complications       Task: Provide education on high blood glucose - causes, signs/symptoms, prevention and treatment    Responsible User: Ruthann Alaniz RD      Task: Provide education on low blood glucose - causes, signs/symptoms, prevention, treatment, carrying a carbohydrate source at all times, and medical identification    Responsible User: Ruthann Alaniz RD      Task: Provide education on safe travel with diabetes    Responsible User: Ruthann Alaniz RD      Task: Provide education on how to care for diabetes on sick days    Responsible User: Ruthann Alaniz RD      Task: Provide education on when to call a health care provider    Responsible User: Ruthann Alaniz RD      Goal: Reducing Risks - know how to prevent and treat long-term diabetes complications       Task: Provide education on major complications of diabetes, prevention, early diagnostic measures and treatment of complications    Responsible User: Ruthann Alaniz RD      Task: Provide education on recommended care for dental, eye and foot health    Responsible User: Ruthann Alaniz RD      Task: Provide education on Hemoglobin A1c - goals and relationship to blood glucose levels    Responsible User: Ruthann Alaniz RD      Task: Provide education on recommendations for heart health - lipid levels and goals, blood pressure and goals, and aspirin therapy, if indicated    Responsible User: Ruthann Alaniz RD      Task: Provide education on tobacco cessation    Responsible User: Ruthann Alaniz RD      Goal: Healthy Coping - use available resources to cope with the challenges of managing diabetes       Task: Discuss recognizing feelings about having diabetes    Responsible User:  Ruthann Alaniz RD      Task: Provide education on the benefits of making appropriate lifestyle changes    Responsible User: Ruthann Alaniz RD      Task: Provide education on benefits of utilizing support systems    Responsible User: Ruthann Alaniz RD      Task: Discuss methods for coping with stress    Responsible User: Ruthann Alaniz RD      Task: Provide education on when to seek professional counseling    Responsible User: Ruthann Alaniz RD              Time Spent: 30 minutes  Encounter Type: Individual    Any diabetes medication dose changes were made via the CDE Protocol per the patient's primary care provider. A copy of this encounter was shared with the provider.

## 2023-06-15 ENCOUNTER — LAB (OUTPATIENT)
Dept: LAB | Facility: CLINIC | Age: 78
End: 2023-06-15
Payer: COMMERCIAL

## 2023-06-15 ENCOUNTER — ANTICOAGULATION THERAPY VISIT (OUTPATIENT)
Dept: ANTICOAGULATION | Facility: CLINIC | Age: 78
End: 2023-06-15

## 2023-06-15 DIAGNOSIS — Z79.01 LONG TERM CURRENT USE OF ANTICOAGULANT THERAPY: Chronic | ICD-10-CM

## 2023-06-15 DIAGNOSIS — I48.20 CHRONIC ATRIAL FIBRILLATION (H): Primary | Chronic | ICD-10-CM

## 2023-06-15 DIAGNOSIS — I48.91 ATRIAL FIBRILLATION (H): ICD-10-CM

## 2023-06-15 LAB — INR BLD: 2 (ref 0.9–1.1)

## 2023-06-15 PROCEDURE — 36416 COLLJ CAPILLARY BLOOD SPEC: CPT

## 2023-06-15 PROCEDURE — 85610 PROTHROMBIN TIME: CPT

## 2023-06-15 NOTE — PROGRESS NOTES
ANTICOAGULATION MANAGEMENT     Antoine Russo 77 year old male is on warfarin with therapeutic INR result. (Goal INR 2.0-3.0)    Recent labs: (last 7 days)     06/15/23  0656   INR 2.0*       ASSESSMENT       Source(s): Chart Review and Patient/Caregiver Call       Warfarin doses taken: Warfarin taken as instructed    Diet: No new diet changes identified    Medication/supplement changes: None noted    New illness, injury, or hospitalization: No    Signs or symptoms of bleeding or clotting: No    Previous result: Therapeutic last 2(+) visits    Additional findings: None     PLAN     Recommended plan for no diet, medication or health factor changes affecting INR     Dosing Instructions: Continue your current warfarin dose with next INR in 6 weeks       Summary  As of 6/15/2023    Full warfarin instructions:  1.25 mg every Mon, Wed, Fri; 2.5 mg all other days   Next INR check:  7/27/2023             Telephone call with Poli or Saleem who verbalizes understanding and agrees to plan    Lab visit scheduled    Education provided:     Please call back if any changes to your diet, medications or how you've been taking warfarin    Plan made per [facility] anticoagulation protocol    Madhavi Sanders RN  Anticoagulation Clinic  6/15/2023    _______________________________________________________________________     Anticoagulation Episode Summary     Current INR goal:  2.0-3.0   TTR:  60.8 % (11.7 mo)   Target end date:  Indefinite   Send INR reminders to:  [facility]    Indications    Atrial fibrillation (H) [I48.91]  Long term current use of anticoagulant therapy [Z79.01]  Chronic atrial fibrillation (H) [I48.20]           Comments:           Anticoagulation Care Providers     Provider Role Specialty Phone number    Katie Martino MD Referring Family Medicine [phone]    Brianna Matute APRN CNP Referring Family Medicine [phone]

## 2023-07-12 ENCOUNTER — TELEPHONE (OUTPATIENT)
Dept: FAMILY MEDICINE | Facility: CLINIC | Age: 78
End: 2023-07-12
Payer: COMMERCIAL

## 2023-07-12 NOTE — TELEPHONE ENCOUNTER
Prior Authorization Retail Medication Request    Medication/Dose: Rybelsus 7MG tablets  ICD code (if different than what is on RX):    Previously Tried and Failed:    Rationale:      Insurance Name:    Insurance ID:        Pharmacy Information (if different than what is on RX)  Name:    Phone:       Cover my meds:  Key: [id number]

## 2023-07-13 ENCOUNTER — APPOINTMENT (OUTPATIENT)
Dept: ULTRASOUND IMAGING | Facility: CLINIC | Age: 78
End: 2023-07-13
Attending: FAMILY MEDICINE
Payer: COMMERCIAL

## 2023-07-13 ENCOUNTER — HOSPITAL ENCOUNTER (EMERGENCY)
Facility: CLINIC | Age: 78
Discharge: HOME | End: 2023-07-13
Attending: FAMILY MEDICINE | Admitting: FAMILY MEDICINE
Payer: COMMERCIAL

## 2023-07-13 ENCOUNTER — TELEPHONE (OUTPATIENT)
Dept: ANTICOAGULATION | Facility: CLINIC | Age: 78
End: 2023-07-13

## 2023-07-13 VITALS
RESPIRATION RATE: 16 BRPM | WEIGHT: 200 LBS | TEMPERATURE: 98.2 F | HEIGHT: 70 IN | OXYGEN SATURATION: 96 % | SYSTOLIC BLOOD PRESSURE: 151 MMHG | BODY MASS INDEX: 28.63 KG/M2 | HEART RATE: 92 BPM | DIASTOLIC BLOOD PRESSURE: 92 MMHG

## 2023-07-13 DIAGNOSIS — L03.115 CELLULITIS OF RIGHT LOWER EXTREMITY: ICD-10-CM

## 2023-07-13 DIAGNOSIS — Z79.01 LONG TERM CURRENT USE OF ANTICOAGULANT THERAPY: Chronic | ICD-10-CM

## 2023-07-13 DIAGNOSIS — I73.9 PERIPHERAL VASCULAR DISEASE (H): ICD-10-CM

## 2023-07-13 DIAGNOSIS — I48.91 ATRIAL FIBRILLATION (H): Primary | Chronic | ICD-10-CM

## 2023-07-13 DIAGNOSIS — I48.20 CHRONIC ATRIAL FIBRILLATION (H): ICD-10-CM

## 2023-07-13 LAB
ALBUMIN SERPL BCG-MCNC: 3.3 G/DL (ref 3.5–5.2)
ALP SERPL-CCNC: 72 U/L (ref 40–129)
ALT SERPL W P-5'-P-CCNC: 24 U/L (ref 0–70)
ANION GAP SERPL CALCULATED.3IONS-SCNC: 10 MMOL/L (ref 7–15)
AST SERPL W P-5'-P-CCNC: 29 U/L (ref 0–45)
BASOPHILS # BLD MANUAL: 0.1 10E3/UL (ref 0–0.2)
BASOPHILS NFR BLD MANUAL: 1 %
BILIRUB SERPL-MCNC: 0.9 MG/DL
BUN SERPL-MCNC: 15.4 MG/DL (ref 8–23)
CALCIUM SERPL-MCNC: 9.9 MG/DL (ref 8.8–10.2)
CHLORIDE SERPL-SCNC: 103 MMOL/L (ref 98–107)
CREAT SERPL-MCNC: 0.96 MG/DL (ref 0.67–1.17)
DEPRECATED HCO3 PLAS-SCNC: 26 MMOL/L (ref 22–29)
EOSINOPHIL # BLD MANUAL: 0 10E3/UL (ref 0–0.7)
EOSINOPHIL NFR BLD MANUAL: 0 %
ERYTHROCYTE [DISTWIDTH] IN BLOOD BY AUTOMATED COUNT: 17 % (ref 10–15)
GFR SERPL CREATININE-BSD FRML MDRD: 82 ML/MIN/1.73M2
GLUCOSE SERPL-MCNC: 162 MG/DL (ref 70–99)
HCT VFR BLD AUTO: 47.1 % (ref 40–53)
HGB BLD-MCNC: 15.5 G/DL (ref 13.3–17.7)
INR PPP: 2.16 (ref 0.85–1.15)
LYMPHOCYTES # BLD MANUAL: 1.3 10E3/UL (ref 0.8–5.3)
LYMPHOCYTES NFR BLD MANUAL: 14 %
MCH RBC QN AUTO: 33.7 PG (ref 26.5–33)
MCHC RBC AUTO-ENTMCNC: 32.9 G/DL (ref 31.5–36.5)
MCV RBC AUTO: 102 FL (ref 78–100)
METAMYELOCYTES # BLD MANUAL: 0.1 10E3/UL
METAMYELOCYTES NFR BLD MANUAL: 1 %
MONOCYTES # BLD MANUAL: 0.7 10E3/UL (ref 0–1.3)
MONOCYTES NFR BLD MANUAL: 8 %
NEUTROPHILS # BLD MANUAL: 6.8 10E3/UL (ref 1.6–8.3)
NEUTROPHILS NFR BLD MANUAL: 76 %
PLAT MORPH BLD: ABNORMAL
PLATELET # BLD AUTO: 143 10E3/UL (ref 150–450)
POTASSIUM SERPL-SCNC: 4.5 MMOL/L (ref 3.4–5.3)
PROT SERPL-MCNC: 6.8 G/DL (ref 6.4–8.3)
RBC # BLD AUTO: 4.6 10E6/UL (ref 4.4–5.9)
RBC MORPH BLD: ABNORMAL
SODIUM SERPL-SCNC: 139 MMOL/L (ref 136–145)
WBC # BLD AUTO: 9 10E3/UL (ref 4–11)

## 2023-07-13 PROCEDURE — 99284 EMERGENCY DEPT VISIT MOD MDM: CPT | Performed by: FAMILY MEDICINE

## 2023-07-13 PROCEDURE — 93926 LOWER EXTREMITY STUDY: CPT | Mod: RT

## 2023-07-13 PROCEDURE — 85610 PROTHROMBIN TIME: CPT | Performed by: FAMILY MEDICINE

## 2023-07-13 PROCEDURE — 80053 COMPREHEN METABOLIC PANEL: CPT | Performed by: FAMILY MEDICINE

## 2023-07-13 PROCEDURE — 99284 EMERGENCY DEPT VISIT MOD MDM: CPT | Mod: 25 | Performed by: FAMILY MEDICINE

## 2023-07-13 PROCEDURE — 93971 EXTREMITY STUDY: CPT | Mod: RT

## 2023-07-13 PROCEDURE — 85027 COMPLETE CBC AUTOMATED: CPT | Performed by: FAMILY MEDICINE

## 2023-07-13 PROCEDURE — 85007 BL SMEAR W/DIFF WBC COUNT: CPT | Performed by: FAMILY MEDICINE

## 2023-07-13 PROCEDURE — 36415 COLL VENOUS BLD VENIPUNCTURE: CPT | Performed by: FAMILY MEDICINE

## 2023-07-13 RX ORDER — CEPHALEXIN 500 MG/1
500 CAPSULE ORAL 4 TIMES DAILY
Qty: 40 CAPSULE | Refills: 0 | Status: SHIPPED | OUTPATIENT
Start: 2023-07-13 | End: 2023-07-23

## 2023-07-13 ASSESSMENT — ACTIVITIES OF DAILY LIVING (ADL)
ADLS_ACUITY_SCORE: 41
ADLS_ACUITY_SCORE: 41

## 2023-07-13 NOTE — ED PROVIDER NOTES
"  History     Chief Complaint   Patient presents with    Leg Pain     HPI  Antoine Russo is a 77 year old male, past medical history is significant for chronic atrial fibrillation, pancreatitis, stage III renal disease, alcohol abuse, alcoholic gastritis, peripheral neuropathy, chronic low back pain, alcohol dependence, lumbar spinal stenosis, type 2 diabetes, peripheral vascular disease, hyperlipidemia, CAD, GERD, hypertension, BPH, impotence, chronic anticoagulation on Coumadin, presents to the emergency department concerns of leg pain beginning sometime around noon yesterday.  No trauma.  History is obtained from the patient who presents with his wife.  The patient states that in the absence of trauma he noted pain in his right medial thigh area that has gradually radiated down into the right foot with some swelling in that area.  He has a peripheral neuropathy and so does not really notice any tingling or numbness but does note that he has had some type of \"arterial Roto-Rooter of his left lower extremity\" in the past.  (Recanalization) never had any issues with his right leg.  I reviewed the patient's past medical history carefully in the EHR as well as imaging studies over the last year including CTA abdomen pelvis.      Allergies:  Allergies   Allergen Reactions    Nkda [No Known Drug Allergy]        Problem List:    Patient Active Problem List    Diagnosis Date Noted    Anticipated difficulty with intubation 05/23/2017     Priority: High     Class: Chronic     Difficult two hands mask ventilation, intubated multiple times asleep with video laryngoscope. H/o tongue cancer surgery.       Chronic atrial fibrillation (H) 04/11/2023     Priority: Medium    Acute pancreatitis 11/01/2022     Priority: Medium    Chronic kidney disease, stage 2 (mild) 01/20/2022     Priority: Medium    Alcohol abuse 02/16/2021     Priority: Medium    Acute gastritis without hemorrhage, unspecified gastritis type 10/17/2020     " Priority: Medium    Peripheral polyneuropathy 05/12/2020     Priority: Medium    Chronic low back pain with sciatica, sciatica laterality unspecified, unspecified back pain laterality 05/12/2020     Priority: Medium    Alcohol dependence, uncomplicated (H) 02/04/2020     Priority: Medium    Spinal stenosis of lumbar region, unspecified whether neurogenic claudication present 02/04/2020     Priority: Medium    Pancreatic pseudocyst 04/18/2019     Priority: Medium    Long term current use of anticoagulant therapy 04/05/2018     Priority: Medium    Recurrent pancreatitis 03/30/2018     Priority: Medium     history of necrotizing pancreatitis, pancreatic duct leak, and a history of a stent in 2018, since removed      Atrial fibrillation (H) 01/23/2018     Priority: Medium     Paroxysmal atrial fibrillation, which was diagnosed as an incidental finding in the past.  Most recently he was evaluated by his PCP on 01/09, and actually during the visit he was in atrial fibrillation.  Later he was admitted in the hospital on 01/22 with acute pancreatitis.  Hospital stay was complicated by AFib with moments of RVR.   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       H/O colectomy 08/08/2013     Priority: Medium    [facility] 06/14/2013     Priority: Medium     EMERGENCY CARE PLAN  June 14, 2013: No current Care Coordination follow up planned. Please refer if Care Coordination services are needed.    Presenting Problem Signs and Symptoms Treatment Plan   Questions or concerns   during clinic hours   I will call my clinic  directly:  [address], [place] [address]  [phone].   Questions or concerns outside clinic hours   I will call the 24 hour nurse line at   [phone] or [facility].   Need to schedule an appointment   I will call the 24 hour scheduling team at [phone] or my clinic directly at [phone].   Same day treatment     I will call my clinic first, nurse line if after hours, urgent care and express care if needed.     Clinic care coordination services (regular clinic hours)   I will call a clinic care coordinator directly:     Shelia Emanuel RN [facility]  [phone]    JAY Estes:    [phone]    Or call my clinic at [phone] and ask to speak with care coordination.     Crisis Services: Behavioral or Mental Health  [facility] 24 Hour Phone Line  [phone]    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 Gerson Ravi and collekapil at the  in 2012  NAD 2017  CT scan of the neck every 2-3 years.  - Thyroid labs yearly.  - Carotid ultrasound in three to four years to evaluate for stenosis.      Hyperlipidemia LDL goal <100 10/31/2010     Priority: Medium    CAD (coronary artery disease) 05/26/2009     Priority: Medium     Stress testing 2009 showed inferior wall ischemia.  Cath preformed; identified moderate CAD with stenosis of 40-50% in LAD  and RCA.  No stents were placed.  Echo in 01/2018 (done while in Afib with rates of 100-110); EF of 55-60%.  No RWMA.      GERD (gastroesophageal reflux disease) 05/26/2009     Priority: Medium    Hypertrophy of breast 09/25/2007     Priority: Medium    PERS HX TOBACCO USE - quit in 11/06 with chantix 03/15/2007     Priority: Medium    Hypertension, benign essential, goal below 140/90 11/07/2005     Priority: Medium     Patient has only fair bp control and with family history of diabetes will all ace if lab indicated also has bph and may op for psa and not digital exam next yr       Pain in joint, shoulder region 11/07/2005     Priority: Medium    Hypertrophy of prostate without urinary obstruction 11/07/2005     Priority: Medium     Problem list name updated by automated process. Provider to review      Impotence of organic origin 11/07/2005     Priority: Medium        Past Medical History:    Past Medical History:   Diagnosis Date    Acute gastritis 10/3/2020    Acute kidney injury (H) 04/17/2019    Acute on chronic pancreatitis (H) 01/23/2018    Acute pancreatitis 10/21/2018    Acute recurrent pancreatitis 10/17/2020    Acute right-sided low back pain with left-sided sciatica 2/4/2020    Bacteriuria with pyuria 04/17/2019    C. difficile colitis     Chronic atrial fibrillation (H)     Colon polyp 08/26/2011    Dehydration 2/16/2021    Diabetes mellitus (H)     Groin fluid collection 12/15/2012    Hypertension     Malignant neoplasm (H) 08/2012    Pancreatic duct leak 5/3/2016    Recurrent pancreatitis        Past Surgical History:    Past Surgical History:   Procedure Laterality Date    BREAST SURGERY  01/01/2008    right breast mass benign    COLECTOMY SUBTOTAL  2013    With diverting ostomy creation; done for toxic C difficile colitis    COLONOSCOPY      multiple polyps removed    COLONOSCOPY  08/24/2011    TUMOR/POLYP/LESION BY SNARE    COLONOSCOPY  12/17/2012    COLONOSCOPY    COLONOSCOPY  12/18/2012     COLONOSCOPY    DENERVATION OF SPERMATIC CORD MICROSURGICAL Left 05/23/2017    Procedure: DENERVATION OF SPERMATIC CORD MICROSURGICAL;;  Surgeon: Marcio Aggarwal MD;  [facility]    DISSECTION RADICAL NECK BILATERAL  08/02/2012    Procedure: DISSECTION RADICAL NECK BILATERAL;;  Surgeon: Yung Alvares MD;  [facility]    ENDOSCOPIC RETROGRADE CHOLANGIOPANCREATOGRAM N/A 05/10/2016    Procedure: COMBINED ENDOSCOPIC RETROGRADE CHOLANGIOPANCREATOGRAPHY, PLACE TUBE/STENT;  Surgeon: Yovanny Beasley MD;  [facility]    ENDOSCOPIC RETROGRADE CHOLANGIOPANCREATOGRAM N/A 03/29/2018    Procedure: ENDOSCOPIC RETROGRADE CHOLANGIOPANCREATOGRAM;  Endoscopic Retrograde Cholangiopancreatogram, Endoscopic Ultrasound, Biliary Sphincterotomy, Biliary and Pancreatic Stent Placement;  Surgeon: Yovanny Beasley MD;  [facility]    ENDOSCOPIC ULTRASOUND UPPER GASTROINTESTINAL TRACT (GI) N/A 02/03/2016    Procedure: ENDOSCOPIC ULTRASOUND, ESOPHAGOSCOPY / UPPER GASTROINTESTINAL TRACT (GI);  Surgeon: Grabiel Plata MD;  [facility]    ENDOSCOPIC ULTRASOUND UPPER GASTROINTESTINAL TRACT (GI) N/A 03/29/2018    Procedure: ENDOSCOPIC ULTRASOUND, ESOPHAGOSCOPY / UPPER GASTROINTESTINAL TRACT (GI);;  Surgeon: Grabiel Plata MD;  [facility]    ESOPHAGOSCOPY, GASTROSCOPY, DUODENOSCOPY (EGD), COMBINED N/A 02/03/2016    Procedure: COMBINED ENDOSCOPIC ULTRASOUND, ESOPHAGOSCOPY, GASTROSCOPY, DUODENOSCOPY (EGD), FINE NEEDLE ASPIRATE/BIOPSY;  Surgeon: Grabiel Plata MD;  [facility]    ESOPHAGOSCOPY, GASTROSCOPY, DUODENOSCOPY (EGD), COMBINED N/A 06/08/2016    Procedure: COMBINED ESOPHAGOSCOPY, GASTROSCOPY, DUODENOSCOPY (EGD), REMOVE FOREIGN BODY;  Surgeon: Yovanny Beasley MD;  [facility]    ESOPHAGOSCOPY, GASTROSCOPY, DUODENOSCOPY (EGD), COMBINED N/A 04/17/2018    Procedure: COMBINED ESOPHAGOSCOPY, GASTROSCOPY, DUODENOSCOPY (EGD), REMOVE FOREIGN BODY;  EGD with stent removal;   Surgeon: Grabiel Plata MD;  [facility]    ESOPHAGOSCOPY, GASTROSCOPY, DUODENOSCOPY (EGD), COMBINED N/A 10/26/2022    Procedure: ESOPHAGOGASTRODUODENOSCOPY, WITH BIOPSY;  Surgeon: Jonatan Celeste MD;  Location: [facility]    EXCISE LESION INTRAORAL  06/14/2012    Procedure: EXCISE LESION INTRAORAL;  Wide Local Excision Floor of Mouth, Direct Laryngoscopy, Bilateral McLean's Marsuplization, Split Thickness Skin Graft from right Thigh  Latex Safe;  Surgeon: Gerson Ravi MD;  [facility]    EXCISE LESION INTRAORAL  08/02/2012    Procedure: EXCISE LESION INTRAORAL;  Floor of Mouth Resection, Bilateral Selective Radical Neck Dissection, Tracheostomy, Left Radial Forearm  Free Flap with Alloderm, Nasogastric Feeding Tube Placement,    * Latex Safe*;  Surgeon: Gerson Ravi MD;  [facility]    EXCISE LESION INTRAORAL  12/11/2012    Procedure: EXCISE LESION INTRAORAL;  takedown of oral flap;  Surgeon: Yung Alvares MD;  [facility]    GRAFT FREE VASCULARIZED (LOCATION)  08/02/2012    Procedure: GRAFT FREE VASCULARIZED (LOCATION);;  Surgeon: Yung Alvares MD;  [facility]    GRAFT SKIN SPLIT THICKNESS FROM EXTREMITY  06/14/2012    Procedure: GRAFT SKIN SPLIT THICKNESS FROM EXTREMITY;;  Surgeon: Gerson Ravi MD;  [facility]    IR LOWER EXTREMITY ANGIOGRAM LEFT  9/30/2022    LAPAROSCOPIC ILEOSTOMY TAKEDOWN  06/06/2013    LAPAROSCOPIC ILEOSTOMY TAKEDOWN    LAPAROTOMY EXPLORATORY  12/20/2012    LAPAROTOMY EXPLORATORY with Colectomy    LARYNGOSCOPY  06/14/2012    Procedure: LARYNGOSCOPY;;  Surgeon: Gerson Ravi MD;  [facility]    ORTHOPEDIC SURGERY      ganglian cyst left ankle    PANCREATECTOMY, SPLENECTOMY N/A 03/10/2016    Procedure: PANCREATECTOMY, SPLENECTOMY;  Surgeon: Nael Abel MD;  [facility]    SHOULDER SURGERY  2006, 2008    2006- right rotator cuff, 2008 bone spur on left. Dr. Hdez    VARICOCELECTOMY Left 05/23/2017    Procedure: VARICOCELECTOMY;  Left  Varicocele Repair, Denervation of Left Testis;  Surgeon: Marcio Aggarwal MD;  [facility]       Family History:    Family History   Problem Relation Age of Onset    Diabetes Sister         onset age 50    Alzheimer Disease Mother          80    Alzheimer Disease Father          85    Diabetes Other         nephew type 1    Diabetes Other     Aneurysm Sister     Anesthesia Reaction No family hx of     Colon Cancer No family hx of     Colon Polyps No family hx of     Crohn's Disease No family hx of     Ulcerative Colitis No family hx of        Social History:  Marital Status:   [2]  Social History     Tobacco Use    Smoking status: Former     Packs/day: 1.00     Years: 40.00     Pack years: 40.00     Types: Cigarettes     Quit date: 2006     Years since quittin.6    Smokeless tobacco: Never   Vaping Use    Vaping Use: Never used   Substance Use Topics    Alcohol use: Yes    Drug use: Never        Medications:    ASPIRIN NOT PRESCRIBED (INTENTIONAL)  atorvastatin (LIPITOR) 80 MG tablet  clopidogrel (PLAVIX) 75 MG tablet  Continuous Blood Gluc  (FREESTYLE LISA 2 READER) JOSE ALBERTO  Continuous Blood Gluc Sensor (FREESTYLE LISA 14 DAY SENSOR) MISC  Continuous Blood Gluc Sensor (FREESTYLE LISA 2 SENSOR) MISC  insulin glargine (LANTUS PEN) 100 UNIT/ML pen  insulin NPH (NOVOLIN N FLEXPEN RELION) 100 UNIT/ML injection  insulin pen needle (BD LUIS U/F) 32G X 4 MM miscellaneous  lisinopril (ZESTRIL) 10 MG tablet  metoprolol succinate ER (TOPROL XL) 50 MG 24 hr tablet  multivitamin, therapeutic with minerals (THERA-VIT-M) TABS  pregabalin (LYRICA) 150 MG capsule  Semaglutide (RYBELSUS) 7 MG tablet  tamsulosin (FLOMAX) 0.4 MG capsule  vitamin B-12 (CYANOCOBALAMIN) 100 MCG tablet  Vitamin D3 (CHOLECALCIFEROL) 25 mcg (1000 units) tablet  warfarin ANTICOAGULANT (COUMADIN) 2.5 MG tablet          Review of Systems   All other systems reviewed and are negative.      Physical Exam   BP: (!)  "150/88  Pulse: 82  Temp: 98.2  F (36.8  C)  Resp: 16  Height: 177.8 cm (5' 10\")  Weight: 90.7 kg (200 lb)  SpO2: 92 %      Physical Exam  Vitals and nursing note reviewed.   Constitutional:       General: He is not in acute distress.     Appearance: Normal appearance. He is normal weight. He is not ill-appearing.   Musculoskeletal:        Legs:       Comments: The area diagrammed is faintly erythematous and warm, tender, there are palpable right inguinal nodes which are tender on the right side.  Homans' sign is positive.  Intact dorsalis pedis pulse although diminished.   Neurological:      Mental Status: He is alert.         ED Course                 Procedures           Results for orders placed or performed during the hospital encounter of 07/13/23 (from the past 24 hour(s))   CBC with platelets, differential    Narrative    The following orders were created for panel order CBC with platelets, differential.  Procedure                               Abnormality         Status                     ---------                               -----------         ------                     CBC with platelets and d...[[id number]]  Abnormal            Final result               Manual Differential[[id number]]          Abnormal            Final result                 Please view results for these tests on the individual orders.   Comprehensive metabolic panel   Result Value Ref Range    Sodium 139 136 - 145 mmol/L    Potassium 4.5 3.4 - 5.3 mmol/L    Chloride 103 98 - 107 mmol/L    Carbon Dioxide (CO2) 26 22 - 29 mmol/L    Anion Gap 10 7 - 15 mmol/L    Urea Nitrogen 15.4 8.0 - 23.0 mg/dL    Creatinine 0.96 0.67 - 1.17 mg/dL    Calcium 9.9 8.8 - 10.2 mg/dL    Glucose 162 (H) 70 - 99 mg/dL    Alkaline Phosphatase 72 40 - 129 U/L    AST 29 0 - 45 U/L    ALT 24 0 - 70 U/L    Protein Total 6.8 6.4 - 8.3 g/dL    Albumin 3.3 (L) 3.5 - 5.2 g/dL    Bilirubin Total 0.9 <=1.2 mg/dL    GFR Estimate 82 >60 mL/min/1.73m2   INR   Result Value " Ref Range    INR 2.16 (H) 0.85 - 1.15   CBC with platelets and differential   Result Value Ref Range    WBC Count 9.0 4.0 - 11.0 10e3/uL    RBC Count 4.60 4.40 - 5.90 10e6/uL    Hemoglobin 15.5 13.3 - 17.7 g/dL    Hematocrit 47.1 40.0 - 53.0 %     (H) 78 - 100 fL    MCH 33.7 (H) 26.5 - 33.0 pg    MCHC 32.9 31.5 - 36.5 g/dL    RDW 17.0 (H) 10.0 - 15.0 %    Platelet Count 143 (L) 150 - 450 10e3/uL   Manual Differential   Result Value Ref Range    % Neutrophils 76 %    % Lymphocytes 14 %    % Monocytes 8 %    % Eosinophils 0 %    % Basophils 1 %    % Metamyelocytes 1 %    Absolute Neutrophils 6.8 1.6 - 8.3 10e3/uL    Absolute Lymphocytes 1.3 0.8 - 5.3 10e3/uL    Absolute Monocytes 0.7 0.0 - 1.3 10e3/uL    Absolute Eosinophils 0.0 0.0 - 0.7 10e3/uL    Absolute Basophils 0.1 0.0 - 0.2 10e3/uL    Absolute Metamyelocytes 0.1 (H) <=0.0 10e3/uL    RBC Morphology Confirmed RBC Indices     Platelet Assessment (A) Automated Count Confirmed. Platelet morphology is normal.     Automated Count Confirmed. Giant platelets are present.   US Lower Extremity Venous Duplex Right    Narrative    US LOWER EXTREMITY VENOUS DUPLEX RIGHT 7/13/2023 11:40 AM    CLINICAL HISTORY/INDICATION: Pain and swelling of the right thigh and  calf, rule out DVT.    COMPARISON: None relevant    TECHNIQUE:   Grayscale, color-flow, and spectral waveform analysis were performed  of the deep veins of the right lower extremity    FINDINGS:   The right common femoral vein, femoral vein, popliteal vein and tibial  veins demonstrate normal compressibility, spectral waveform, color  flow and augmentation.    The contralateral left common femoral vein demonstrates normal  compressibility, spectral waveform, color flow and augmentation.      Impression    IMPRESSION:   No evidence of deep venous thrombosis in the right lower extremity.     JUDY RIVERS DO         SYSTEM ID:  [id number]   US Lower Extremity Arterial Duplex Right    Narrative    ARTERIAL  DUPLEX ULTRASOUND RIGHT LOWER EXTREMITY   7/13/2023 12:03 PM     HISTORY: Peripheral vascular disease, right lower extremity pain.    COMPARISON: None.    FINDINGS: Color Doppler and spectral waveform analysis was performed  throughout the arteries of the right lower extremity. Significant  stenosis in the distal superficial femoral artery with a peak systolic  velocity of 327 cm/s with minimal color flow noted throughout the  vessel. Significant stenosis of the popliteal artery with a diameter  of 2 mm; however, there is not an associated elevated velocity. Common  femoral, profunda femoral, proximal superficial femoral, mid  superficial femoral, anterior tibial, posterior tibial and peroneal  arteries are patent. Waveforms are monophasic throughout, aortoiliac  disease.       Impression    IMPRESSION:  1. Significant stenosis throughout the distal superficial femoral  artery with minimal flow noted.  2. Stenosis of the popliteal artery.  3. Significant atherosclerotic calcification throughout the visualized  vessels.  4. Common femoral, profunda femoral, proximal and mid superficial  femoral, anterior tibial, posterior tibial and peroneal arteries are  patent. Waveforms are monophasic throughout, suggesting aortoiliac  disease. Consider CTA for further evaluation.    JUDY RIVERS DO         SYSTEM ID:  [id number]     *Note: Due to a large number of results and/or encounters for the requested time period, some results have not been displayed. A complete set of results can be found in Results Review.   1:13 PM  Results reviewed in room with the patient and his wife.  The patient has multilevel significant stenosis throughout the right lower extremity arterial vasculature.  I discussed this with him in the context of his previous issues with his left leg.  His bilateral lower extremities are currently clinically warm, he has no paresthesias admittedly though he does have underlying neuropathy which makes this  assessment more difficult.  Diminished but palpable pulses in the feet.  The areas of redness warmth tenderness and swelling are concerning for skin infection i.e. cellulitis.   Ultrasound negative for DVT.  I had a long talk with the patient and his wife about referral back to vascular surgery for further evaluation and discussion about management of his right lower extremity and aortoiliac disease previously identified on his CTA in 9/13/2022.  Signs of acute ischemia were reviewed with them once again although they are familiar with this somewhat from previous behavior and presentation.  We will treat today with concerns of the cutaneous infection, close follow-up, if he is not improving he will return to the emergency department but otherwise I have asked that he complete course of antibiotics and follow-up as soon as possible in clinic with vascular for the additional concerns identified.    Medications - No data to display    Assessments & Plan (with Medical Decision Making)   Assessment and plan with medical decision making at the time stamp above.    Disclaimer: This note consists of symbols derived from keyboarding, dictation and/or voice recognition software. As a result, there may be errors in the script that have gone undetected. Please consider this when interpreting information found in this chart.    I have reviewed the nursing notes.    I have reviewed the findings, diagnosis, plan and need for follow up with the patient.        New Prescriptions    No medications on file       Final diagnoses:   Cellulitis of right lower extremity   Peripheral vascular disease (H)       7/13/2023   [facility] EMERGENCY DEPT       Willam Toure MD  07/13/23 3303

## 2023-07-13 NOTE — TELEPHONE ENCOUNTER
ANTICOAGULATION  MANAGEMENT: Discharge Review    Antoine LOPEZ Rafaela chart reviewed for anticoagulation continuity of care    Emergency room visit on 7-13-23 for cellulitis of RLE.    Discharge disposition: Home    Results:    Recent labs: (last 7 days)     07/13/23  1034   INR 2.16*     Anticoagulation inpatient management:     not applicable     Anticoagulation discharge instructions:     Warfarin dosing: home regimen continued   Bridging: No   INR goal change: No      Medication changes affecting anticoagulation: Yes: keflex    Additional factors affecting anticoagulation: Yes: infection     PLAN     Recommend to check INR on 7-20 or 7-21    Left a detailed message for Poli    Anticoagulation Calendar updated. Plan reviewed with Brianna Robles RPh.    Aparna Kunz RN

## 2023-07-13 NOTE — ED TRIAGE NOTES
Pt here with pain in his right thigh that goes down his leg and into his foot that started yesterda. No known injury. Reports right foot is swollen.   Hx of PVD and had surgery that started on that leg right about in the same spot and cleared the vein on his left leg.      Triage Assessment       Row Name 07/13/23 0941       Triage Assessment (Adult)    Airway WDL WDL       Respiratory WDL    Respiratory WDL WDL       Skin Circulation/Temperature WDL    Skin Circulation/Temperature WDL WDL       Cardiac WDL    Cardiac WDL WDL       Peripheral/Neurovascular WDL    Peripheral Neurovascular WDL WDL       Cognitive/Neuro/Behavioral WDL    Cognitive/Neuro/Behavioral WDL WDL

## 2023-07-13 NOTE — DISCHARGE INSTRUCTIONS
Keflex 500 mg p.o. 4 times daily x10 days.  Keep a close eye on the redness and warmth identified on the right lower extremity and if this is not improving over the next 48-72 hours please return for repeat evaluation.  Please follow-up in clinic with vascular with concerns that we identified here today.

## 2023-07-14 ENCOUNTER — PATIENT OUTREACH (OUTPATIENT)
Dept: CARE COORDINATION | Facility: CLINIC | Age: 78
End: 2023-07-14
Payer: COMMERCIAL

## 2023-07-14 NOTE — LETTER
[facility] CARE COORDINATION  [address] [facility] [address]    July 14, 2023    Antoine Russo  [address] [address]      Dear Antoine,        I am a  clinic community health worker who works with Katie Yoder MD with the [facility]. I wanted to thank you for spending the time to talk with me.  Below is a description of clinic care coordination and how I can further assist you.       The clinic care coordination team is made up of a registered nurse, , financial resource worker and community health worker who understand the health care system. The goal of clinic care coordination is to help you manage your health and improve access to the health care system. Our team works alongside your provider to assist you in determining your health and social needs. We can help you obtain health care and community resources, providing you with necessary information and education. We can work with you through any barriers and develop a care plan that helps coordinate and strengthen the communication between you and your care team.  Our services are voluntary and are offered without charge to you personally.    If you wish to connect with the Clinic Care Coordination Team, please let your [facility] Primary Care Provider or Clinic Care Team know and they can place a referral. The Clinic Care Coordination team will then reach out by phone to further support you.    We are focused on providing you with the highest-quality healthcare experience possible.    Sincerely,   Your care team with [facility]

## 2023-07-14 NOTE — PROGRESS NOTES
Clinic Care Coordination Contact  Community Health Worker Initial Outreach    CHW Initial Information Gathering:  Referral Source: ED Follow-Up  Preferred Hospital: [place], [place]  [phone]  Preferred Urgent Care: [facility], [phone]  No PCP office visit in Past Year: No       Patient accepts CC: No, due to the patient stating that at this time there are no concerns for CCC. Patient will be sent Care Coordination introduction letter for future reference.     HÉCTOR Puri  [phone]  Connected Care Resource Center  [facility]

## 2023-07-17 NOTE — TELEPHONE ENCOUNTER
Central Prior Authorization Team - Phone: [phone]     PA Initiation    Medication: RYBELSUS 7 MG PO TABS  Insurance Company: [employer] - Phone [phone] Fax [phone]  Pharmacy Filling the Rx: [facility] [address] [place] [address] - [address] [place] [address]  Filling Pharmacy Phone: [phone]  Filling Pharmacy Fax:    Start Date: 7/17/2023

## 2023-07-18 NOTE — TELEPHONE ENCOUNTER
Central Prior Authorization Team - Phone: [phone]     Prior Authorization Approval    Medication: RYBELSUS 7 MG PO TABS  Authorization Effective Date: 4/18/2023  Authorization Expiration Date: 7/17/2024  Approved Dose/Quantity: 30  Reference #:     Insurance Company: [email or web address] - Phone [phone] Fax [phone]  Expected CoPay:       CoPay Card Available:      Financial Assistance Needed:   Which Pharmacy is filling the prescription: [facility] [address] [place] [address] [address] [facility]  Pharmacy Notified: Yes  Patient Notified: Yes pharmacy will notify when ready

## 2023-07-19 ENCOUNTER — HOSPITAL ENCOUNTER (EMERGENCY)
Facility: CLINIC | Age: 78
Discharge: HOME | End: 2023-07-19
Attending: EMERGENCY MEDICINE | Admitting: EMERGENCY MEDICINE
Payer: COMMERCIAL

## 2023-07-19 ENCOUNTER — DOCUMENTATION ONLY (OUTPATIENT)
Dept: ANTICOAGULATION | Facility: CLINIC | Age: 78
End: 2023-07-19
Payer: COMMERCIAL

## 2023-07-19 VITALS
DIASTOLIC BLOOD PRESSURE: 90 MMHG | TEMPERATURE: 97.7 F | BODY MASS INDEX: 30.31 KG/M2 | HEIGHT: 68 IN | RESPIRATION RATE: 18 BRPM | OXYGEN SATURATION: 95 % | HEART RATE: 89 BPM | SYSTOLIC BLOOD PRESSURE: 163 MMHG | WEIGHT: 200 LBS

## 2023-07-19 DIAGNOSIS — M62.838 NECK MUSCLE SPASM: ICD-10-CM

## 2023-07-19 PROCEDURE — 99283 EMERGENCY DEPT VISIT LOW MDM: CPT | Performed by: EMERGENCY MEDICINE

## 2023-07-19 RX ORDER — DIAZEPAM 5 MG
5 TABLET ORAL EVERY 6 HOURS PRN
Qty: 20 TABLET | Refills: 0 | Status: SHIPPED | OUTPATIENT
Start: 2023-07-19 | End: 2023-07-26

## 2023-07-19 ASSESSMENT — ACTIVITIES OF DAILY LIVING (ADL): ADLS_ACUITY_SCORE: 39

## 2023-07-19 NOTE — ED TRIAGE NOTES
"Right neck pain stiffness started last night and right lower leg swelling with redness.   Pt took a \"left over\" pain pill from last fall that helped     Triage Assessment       Row Name 07/19/23 1004       Skin Circulation/Temperature WDL    Skin Circulation/Temperature WDL X                    "

## 2023-07-19 NOTE — DISCHARGE INSTRUCTIONS
Aminofen 1000 mg every 4-6 hours    Diazepam for spasm, avoid alcohol when taking diazepam    Follow-up primary care for further evaluation    Return here for weakness numbness worsening pain fever or other concern    Finish antibiotic for right lower extremity cellulitis, warm packs to right lower extremity 4-5 times daily

## 2023-07-19 NOTE — PROGRESS NOTES
ANTICOAGULATION  MANAGEMENT: Discharge Review    Antoine JESSICA Russo chart reviewed for anticoagulation continuity of care    Emergency room visit on 7/19 for Neck spasm.    Discharge disposition: Home    Results:    Recent labs: (last 7 days)     07/13/23  1034   INR 2.16*     Anticoagulation inpatient management:     not applicable     Anticoagulation discharge instructions:     Warfarin dosing: home regimen continued   Bridging: No   INR goal change: No      Medication changes affecting anticoagulation: No    Additional factors affecting anticoagulation: No     PLAN     No adjustment to anticoagulation plan needed    Recommended follow up is scheduled  Patient not contacted    No adjustment to Anticoagulation Calendar was required    Lisbet Moya RN

## 2023-07-20 ENCOUNTER — PATIENT OUTREACH (OUTPATIENT)
Dept: CARE COORDINATION | Facility: CLINIC | Age: 78
End: 2023-07-20
Payer: COMMERCIAL

## 2023-07-20 NOTE — LETTER
Antoine Russo  [address] [place] [id number]    Dear Antoine Russo,      I am a team member within the Connected Care Resource Center with [facility]. I recently contacted you to ensure you were doing well following a recent visit within our health system. I also wanted to take this chance to further introduce Clinic Care Coordination if you have any interest in this program in the future.     Below is a description of Clinic Care Coordination and how this team can further assist you:       The Clinic Care Coordination team is made up of a Registered Nurse, , Financial Resource Worker, and a Community Health Worker who understand and can help navigate the health care system. The goal of clinic care coordination is to help you manage your health, improve access to care, and achieve optimal health outcomes. They work alongside your provider to assist you in determining your health and social needs, obtain health care and community resources, and provide you with necessary information and education. Clinic Care Coordination can work with you through any barriers and develop a care plan that helps coordinate and strengthen the relationship between you and your care team.    If you wish to connect with the Clinic Care Coordination Team, please let your [facility] Primary Care Provider or Clinic Care Team know and they can place a referral. The Clinic Care Coordination team will then reach out by phone to further support you.    We are focused on providing you with the highest-quality healthcare experience possible.    Sincerely,   [facility]

## 2023-07-20 NOTE — ED PROVIDER NOTES
History     Chief Complaint   Patient presents with    Neck Pain    Leg Swelling     HPI  Antoine Russo is a 77 year old male who awoke with sharp neck pain, no inciting trauma, denies numbness or weakness to the extremities,    Allergies:  Allergies   Allergen Reactions    Nkda [No Known Drug Allergy]        Problem List:    Patient Active Problem List    Diagnosis Date Noted    Anticipated difficulty with intubation 05/23/2017     Priority: High     Class: Chronic     Difficult two hands mask ventilation, intubated multiple times asleep with video laryngoscope. H/o tongue cancer surgery.       Chronic atrial fibrillation (H) 04/11/2023     Priority: Medium    Acute pancreatitis 11/01/2022     Priority: Medium    Chronic kidney disease, stage 2 (mild) 01/20/2022     Priority: Medium    Alcohol abuse 02/16/2021     Priority: Medium    Acute gastritis without hemorrhage, unspecified gastritis type 10/17/2020     Priority: Medium    Peripheral polyneuropathy 05/12/2020     Priority: Medium    Chronic low back pain with sciatica, sciatica laterality unspecified, unspecified back pain laterality 05/12/2020     Priority: Medium    Alcohol dependence, uncomplicated (H) 02/04/2020     Priority: Medium    Spinal stenosis of lumbar region, unspecified whether neurogenic claudication present 02/04/2020     Priority: Medium    Pancreatic pseudocyst 04/18/2019     Priority: Medium    Long term current use of anticoagulant therapy 04/05/2018     Priority: Medium    Recurrent pancreatitis 03/30/2018     Priority: Medium     history of necrotizing pancreatitis, pancreatic duct leak, and a history of a stent in 2018, since removed      Atrial fibrillation (H) 01/23/2018     Priority: Medium     Paroxysmal atrial fibrillation, which was diagnosed as an incidental finding in the past.  Most recently he was evaluated by his PCP on 01/09, and actually during the visit he was in atrial fibrillation.  Later he was admitted in the  hospital on 01/22 with acute pancreatitis.  Hospital stay was complicated by AFib with moments of RVR.   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       H/O colectomy 08/08/2013     Priority: Medium    [facility]     Priority: Medium     EMERGENCY CARE PLAN  June 14, 2013: No current Care Coordination follow up planned. Please refer if Care Coordination services are needed.    Presenting Problem Signs and Symptoms Treatment Plan   Questions or concerns   during clinic hours   I will call my clinic directly:  [address] [address]  [phone].   Questions or concerns outside clinic hours   I will call the 24 hour nurse line at   [phone] or [address] [place] [address].   Need to schedule an appointment   I will call the 24 hour scheduling team at [phone] or my clinic directly at [phone].   Same day treatment     I will call my clinic first, nurse line if after hours, urgent care and express care if needed.     Clinic care coordination services (regular clinic hours)   I will call a clinic care coordinator directly:     Shelia Emanuel RN [facility]  [phone]    Brianna Cristobal SW:    [phone]    Or call my clinic at [phone] and ask to speak with care coordination.     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 Gerson Ravi and collekapil at the  in 2012  NAD 2017  CT scan of the neck every 2-3 years.  - Thyroid labs yearly.  - Carotid ultrasound in three to four years to evaluate for stenosis.      Hyperlipidemia LDL goal <100 10/31/2010     Priority: Medium    CAD (coronary artery disease) 05/26/2009     Priority: Medium     Stress testing 2009 showed inferior wall ischemia.  Cath preformed; identified moderate CAD with stenosis of 40-50% in LAD and RCA.  No stents were placed.  Echo in 01/2018 (done while in Afib with rates of 100-110); EF of 55-60%.  No RWMA.      GERD (gastroesophageal reflux disease) 05/26/2009     Priority: Medium    Hypertrophy of breast 09/25/2007     Priority: Medium    PERS HX TOBACCO USE - quit in 11/06 with chantix 03/15/2007     Priority: Medium    Hypertension, benign essential, goal below 140/90 11/07/2005     Priority: Medium     Patient has only fair bp control and with family history of diabetes will all ace if lab indicated also has bph and may op for psa and not digital exam next yr       Pain in joint, shoulder region 11/07/2005     Priority: Medium    Hypertrophy of prostate without urinary obstruction 11/07/2005     Priority: Medium     Problem list name updated by automated process. Provider to review      Impotence of organic origin 11/07/2005     Priority: Medium        Past Medical History:    Past Medical History:   Diagnosis Date    Acute gastritis 10/3/2020    Acute kidney injury (H) 04/17/2019     Acute on chronic pancreatitis (H) 01/23/2018    Acute pancreatitis 10/21/2018    Acute recurrent pancreatitis 10/17/2020    Acute right-sided low back pain with left-sided sciatica 2/4/2020    Bacteriuria with pyuria 04/17/2019    C. difficile colitis     Chronic atrial fibrillation (H)     Colon polyp 08/26/2011    Dehydration 2/16/2021    Diabetes mellitus (H)     Groin fluid collection 12/15/2012    Hypertension     Malignant neoplasm (H) 08/2012    Pancreatic duct leak 5/3/2016    Recurrent pancreatitis        Past Surgical History:    Past Surgical History:   Procedure Laterality Date    BREAST SURGERY  01/01/2008    right breast mass benign    COLECTOMY SUBTOTAL  2013    With diverting ostomy creation; done for toxic C difficile colitis    COLONOSCOPY      multiple polyps removed    COLONOSCOPY  08/24/2011    TUMOR/POLYP/LESION BY SNARE    COLONOSCOPY  12/17/2012    COLONOSCOPY    COLONOSCOPY  12/18/2012    COLONOSCOPY    DENERVATION OF SPERMATIC CORD MICROSURGICAL Left 05/23/2017    Procedure: DENERVATION OF SPERMATIC CORD MICROSURGICAL;;  Surgeon: Marcio Aggarwal MD;  [facility]    DISSECTION RADICAL NECK BILATERAL  08/02/2012    Procedure: DISSECTION RADICAL NECK BILATERAL;;  Surgeon: Yung Alvares MD;  [facility]    ENDOSCOPIC RETROGRADE CHOLANGIOPANCREATOGRAM N/A 05/10/2016    Procedure: COMBINED ENDOSCOPIC RETROGRADE CHOLANGIOPANCREATOGRAPHY, PLACE TUBE/STENT;  Surgeon: Yovanny Beasley MD;  [facility]    ENDOSCOPIC RETROGRADE CHOLANGIOPANCREATOGRAM N/A 03/29/2018    Procedure: ENDOSCOPIC RETROGRADE CHOLANGIOPANCREATOGRAM;  Endoscopic Retrograde Cholangiopancreatogram, Endoscopic Ultrasound, Biliary Sphincterotomy, Biliary and Pancreatic Stent Placement;  Surgeon: Yovanny Beasley MD;  [facility]    ENDOSCOPIC ULTRASOUND UPPER GASTROINTESTINAL TRACT (GI) N/A 02/03/2016    Procedure: ENDOSCOPIC ULTRASOUND, ESOPHAGOSCOPY / UPPER GASTROINTESTINAL TRACT (GI);   Surgeon: Grabiel Plata MD;  [facility]    ENDOSCOPIC ULTRASOUND UPPER GASTROINTESTINAL TRACT (GI) N/A 03/29/2018    Procedure: ENDOSCOPIC ULTRASOUND, ESOPHAGOSCOPY / UPPER GASTROINTESTINAL TRACT (GI);;  Surgeon: Grabiel Plata MD;  [facility]    ESOPHAGOSCOPY, GASTROSCOPY, DUODENOSCOPY (EGD), COMBINED N/A 02/03/2016    Procedure: COMBINED ENDOSCOPIC ULTRASOUND, ESOPHAGOSCOPY, GASTROSCOPY, DUODENOSCOPY (EGD), FINE NEEDLE ASPIRATE/BIOPSY;  Surgeon: Grabiel Plata MD;  [facility]    ESOPHAGOSCOPY, GASTROSCOPY, DUODENOSCOPY (EGD), COMBINED N/A 06/08/2016    Procedure: COMBINED ESOPHAGOSCOPY, GASTROSCOPY, DUODENOSCOPY (EGD), REMOVE FOREIGN BODY;  Surgeon: Yovanny Beasley MD;  Location:  GI    ESOPHAGOSCOPY, GASTROSCOPY, DUODENOSCOPY (EGD), COMBINED N/A 04/17/2018    Procedure: COMBINED ESOPHAGOSCOPY, GASTROSCOPY, DUODENOSCOPY (EGD), REMOVE FOREIGN BODY;  EGD with stent removal;  Surgeon: Grabiel Plata MD;  Location:  GI    ESOPHAGOSCOPY, GASTROSCOPY, DUODENOSCOPY (EGD), COMBINED N/A 10/26/2022    Procedure: ESOPHAGOGASTRODUODENOSCOPY, WITH BIOPSY;  Surgeon: Jonatan Celeste MD;  Location: [facility]    EXCISE LESION INTRAORAL  06/14/2012    Procedure: EXCISE LESION INTRAORAL;  Wide Local Excision Floor of Mouth, Direct Laryngoscopy, Bilateral Lowndes's Marsuplization, Split Thickness Skin Graft from right Thigh  Latex Safe;  Surgeon: Gerson Ravi MD;  [facility]    EXCISE LESION INTRAORAL  08/02/2012    Procedure: EXCISE LESION INTRAORAL;  Floor of Mouth Resection, Bilateral Selective Radical Neck Dissection, Tracheostomy, Left Radial Forearm  Free Flap with Alloderm, Nasogastric Feeding Tube Placement,    * Latex Safe*;  Surgeon: Gerson Ravi MD;  [facility]    EXCISE LESION INTRAORAL  12/11/2012    Procedure: EXCISE LESION INTRAORAL;  takedown of oral flap;  Surgeon: Yung Alvares MD;  [facility]    GRAFT FREE VASCULARIZED (LOCATION)   2012    Procedure: GRAFT FREE VASCULARIZED (LOCATION);;  Surgeon: Yung Alvares MD;  [facility]    GRAFT SKIN SPLIT THICKNESS FROM EXTREMITY  2012    Procedure: GRAFT SKIN SPLIT THICKNESS FROM EXTREMITY;;  Surgeon: Gerson Ravi MD;  [facility]    IR LOWER EXTREMITY ANGIOGRAM LEFT  2022    LAPAROSCOPIC ILEOSTOMY TAKEDOWN  2013    LAPAROSCOPIC ILEOSTOMY TAKEDOWN    LAPAROTOMY EXPLORATORY  2012    LAPAROTOMY EXPLORATORY with Colectomy    LARYNGOSCOPY  2012    Procedure: LARYNGOSCOPY;;  Surgeon: Gerson Ravi MD;  [facility]    ORTHOPEDIC SURGERY      ganglian cyst left ankle    PANCREATECTOMY, SPLENECTOMY N/A 03/10/2016    Procedure: PANCREATECTOMY, SPLENECTOMY;  Surgeon: Nael Abel MD;  [facility]    SHOULDER SURGERY  , 2008- right rotator cuff,  bone spur on left. Dr. Hdez    VARICOCELECTOMY Left 2017    Procedure: VARICOCELECTOMY;  Left Varicocele Repair, Denervation of Left Testis;  Surgeon: Marcio Aggarwal MD;  [facility]       Family History:    Family History   Problem Relation Age of Onset    Diabetes Sister         onset age 50    Alzheimer Disease Mother          80    Alzheimer Disease Father          85    Diabetes Other         nephew type 1    Diabetes Other     Aneurysm Sister     Anesthesia Reaction No family hx of     Colon Cancer No family hx of     Colon Polyps No family hx of     Crohn's Disease No family hx of     Ulcerative Colitis No family hx of        Social History:  Marital Status:   [2]  Social History     Tobacco Use    Smoking status: Former     Packs/day: 1.00     Years: 40.00     Pack years: 40.00     Types: Cigarettes     Quit date: 2006     Years since quittin.6    Smokeless tobacco: Never   Vaping Use    Vaping Use: Never used   Substance Use Topics    Alcohol use: Yes    Drug use: Never        Medications:    diazepam (VALIUM) 5 MG tablet  ASPIRIN NOT PRESCRIBED  "(INTENTIONAL)  atorvastatin (LIPITOR) 80 MG tablet  cephALEXin (KEFLEX) 500 MG capsule  clopidogrel (PLAVIX) 75 MG tablet  Continuous Blood Gluc  (FREESTYLE LISA 2 READER) JOSE ALBERTO  Continuous Blood Gluc Sensor (FREESTYLE LISA 14 DAY SENSOR) MISC  Continuous Blood Gluc Sensor (FREESTYLE LISA 2 SENSOR) MISC  insulin glargine (LANTUS PEN) 100 UNIT/ML pen  insulin NPH (NOVOLIN N FLEXPEN RELION) 100 UNIT/ML injection  insulin pen needle (BD LUIS U/F) 32G X 4 MM miscellaneous  lisinopril (ZESTRIL) 10 MG tablet  metoprolol succinate ER (TOPROL XL) 50 MG 24 hr tablet  multivitamin, therapeutic with minerals (THERA-VIT-M) TABS  pregabalin (LYRICA) 150 MG capsule  Semaglutide (RYBELSUS) 7 MG tablet  tamsulosin (FLOMAX) 0.4 MG capsule  vitamin B-12 (CYANOCOBALAMIN) 100 MCG tablet  Vitamin D3 (CHOLECALCIFEROL) 25 mcg (1000 units) tablet  warfarin ANTICOAGULANT (COUMADIN) 2.5 MG tablet          Review of Systems    Physical Exam   BP: (!) 162/76  Pulse: 97  Temp: 97.7  F (36.5  C)  Resp: 18  Height: 172.7 cm (5' 8\")  Weight: 90.7 kg (200 lb)  SpO2: 92 %      Physical Exam  Posterior midline tenderness cervical spine superiorly, some mild paracervical muscular tenderness, limited range of motion  Strength and sensation intact throughout the upper extremities    ED Course                 Procedures                No results found. However, due to the size of the patient record, not all encounters were searched. Please check Results Review for a complete set of results.    Medications - No data to display    Assessments & Plan (with Medical Decision Making)  Neck pain atraumatic, neurologically intact, usual differential considered included but not limited to degenerative spine disease, radiculopathy, epidural hematoma, infection.  No red flags by history or exam, patient is chronically anticoagulated with therapeutic INR just above 2 recently.  Discussed MRI imaging, patient defers.  Diazepam for spasm, acetaminophen for " discomfort.  Avoid alcohol.  Return here for focal neurologic change, worsening pain, fever or any other concern.     I have reviewed the nursing notes.    I have reviewed the findings, diagnosis, plan and need for follow up with the patient.        Discharge Medication List as of 7/19/2023 12:07 PM        START taking these medications    Details   diazepam (VALIUM) 5 MG tablet Take 1 tablet (5 mg) by mouth every 6 hours as needed for anxiety (MUSCLE SPASM), Disp-20 tablet, R-0, E-Prescribe             Final diagnoses:   Neck muscle spasm       7/19/2023   [facility] EMERGENCY DEPT       Rory Robles MD  07/20/23 8668

## 2023-07-20 NOTE — PROGRESS NOTES
Clinic Care Coordination Contact  Community Health Worker Initial Outreach    CHW Initial Information Gathering:  Referral Source: ED Follow-Up  CHW Additional Questions  MyChart active?: Yes    Patient accepts CC: No, patient declined at this time. Patient will be sent Care Coordination introduction letter for future reference.     Keesha Teague  Community Health Worker  [facility] Care Resource [facility]

## 2023-07-21 DIAGNOSIS — I73.9 PAD (PERIPHERAL ARTERY DISEASE) (H): Primary | ICD-10-CM

## 2023-07-24 ENCOUNTER — HOSPITAL ENCOUNTER (OUTPATIENT)
Dept: CT IMAGING | Facility: CLINIC | Age: 78
Discharge: HOME | End: 2023-07-24
Attending: SURGERY
Payer: COMMERCIAL

## 2023-07-24 ENCOUNTER — HOSPITAL ENCOUNTER (OUTPATIENT)
Dept: ULTRASOUND IMAGING | Facility: CLINIC | Age: 78
Discharge: HOME | End: 2023-07-24
Attending: SURGERY
Payer: COMMERCIAL

## 2023-07-24 DIAGNOSIS — I73.9 PAD (PERIPHERAL ARTERY DISEASE) (H): ICD-10-CM

## 2023-07-24 PROCEDURE — 250N000011 HC RX IP 250 OP 636: Performed by: RADIOLOGY

## 2023-07-24 PROCEDURE — 250N000009 HC RX 250: Performed by: RADIOLOGY

## 2023-07-24 PROCEDURE — 75635 CT ANGIO ABDOMINAL ARTERIES: CPT

## 2023-07-24 PROCEDURE — 93924 LWR XTR VASC STDY BILAT: CPT

## 2023-07-24 RX ORDER — IOPAMIDOL 755 MG/ML
100 INJECTION, SOLUTION INTRAVASCULAR ONCE
Status: COMPLETED | OUTPATIENT
Start: 2023-07-24 | End: 2023-07-24

## 2023-07-24 RX ADMIN — IOPAMIDOL 100 ML: 755 INJECTION, SOLUTION INTRAVENOUS at 11:41

## 2023-07-24 RX ADMIN — SODIUM CHLORIDE 100 ML: 9 INJECTION, SOLUTION INTRAVENOUS at 11:41

## 2023-07-25 ENCOUNTER — OFFICE VISIT (OUTPATIENT)
Dept: VASCULAR SURGERY | Facility: CLINIC | Age: 78
End: 2023-07-25
Payer: COMMERCIAL

## 2023-07-25 VITALS
BODY MASS INDEX: 31.63 KG/M2 | DIASTOLIC BLOOD PRESSURE: 78 MMHG | WEIGHT: 208 LBS | HEART RATE: 61 BPM | RESPIRATION RATE: 18 BRPM | SYSTOLIC BLOOD PRESSURE: 131 MMHG | OXYGEN SATURATION: 93 %

## 2023-07-25 DIAGNOSIS — I73.9 PAD (PERIPHERAL ARTERY DISEASE) (H): Primary | ICD-10-CM

## 2023-07-25 PROCEDURE — 99213 OFFICE O/P EST LOW 20 MIN: CPT | Performed by: SURGERY

## 2023-07-25 NOTE — PATIENT INSTRUCTIONS
Adeel Schultz,    Thank you for entrusting your care with us today. After your visit today with MD Lilia Stacy this is the plan that was discussed at your appointment.    We will contact you to schedule follow up with Domonique DEMPSEY in 1 year with repeat imaging.      I am including additional information on these things and our contact information if you have any questions or concerns.   Please do not hesitate to reach out to us if you felt we did not answer your questions or you are unsure of the treatment plan after your visit today. Our number is [phone].Thank you for trusting us with your care.         Again thank you for your time.

## 2023-07-25 NOTE — PROGRESS NOTES
VASCULAR SURGERY PROGRESS NOTE   VASCULAR SURGEON: Lilia Stacy MD, RPVI     LOCATION:  [facility]     Antoine Russo   Medical Record #:  [id number]  YOB: 1945  Age:  77 year old     Date of Service: 7/25/2023    PRIMARY CARE PROVIDER: Katie Martino      Reason for visit:  follow up PAD    IMPRESSION:  77 year old male returns to vascular center following a recent emergency department visit. History of PAD, right lower extremity angiogram with SFA intervention. Symptoms have improved and patient denies any wounds or pain at rest.     RECOMMENDATION/RISKS:  Follow up in one year with repeat imaging. Continue best medical management.     HPI:  Antoine Russo is a 77 year old male who was seen today for follow up.    REVIEW OF SYSTEMS:    A 12 point ROS was reviewed and is negative.     PHH:    Past Medical History:   Diagnosis Date    Acute gastritis 10/3/2020    Acute kidney injury (H) 04/17/2019    Acute on chronic pancreatitis (H) 01/23/2018    Acute pancreatitis 10/21/2018    Acute recurrent pancreatitis 10/17/2020    Acute right-sided low back pain with left-sided sciatica 2/4/2020    Bacteriuria with pyuria 04/17/2019    C. difficile colitis     Chronic atrial fibrillation (H)     On chronic anticoagulation with coumadin    Colon polyp 08/26/2011    Colonoscopy 8/2011-A sessile polyp was found in the cecum. The polyp was 6 mm in size. The polyp was removed with a hot snare. Resection and retrieval were complete. A sessile polyp was found in the proximal transverse colon. The polyp was 15 mm in size. The polyp was removed with a hot snare. Resection and retrieval were complete. A sessile polyp was found in the sigmoid colon. The polyp was 5 mm    Dehydration 2/16/2021    Diabetes mellitus (H)     type 2    Groin fluid collection 12/15/2012    CT 12/12- New (since 5/11) collection measuring at least 2.3 cm in diameter and 6.5 cm in length within the right inguinal  canal. 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, N0, M0 carcinoma, underwent transcervical glossectomy, floor of mouth excision, BL neck dissection, adj radiation, and skin flap reconstruction    Pancreatic duct leak 5/3/2016    Recurrent pancreatitis     alcoholic pancreatitis          Past Surgical History:   Procedure Laterality Date    BREAST SURGERY  01/01/2008    right breast mass benign    COLECTOMY SUBTOTAL  2013    With diverting ostomy creation; done for toxic C difficile colitis    COLONOSCOPY      multiple polyps removed    COLONOSCOPY  08/24/2011    TUMOR/POLYP/LESION BY SNARE    COLONOSCOPY  12/17/2012    COLONOSCOPY    COLONOSCOPY  12/18/2012    COLONOSCOPY    DENERVATION OF SPERMATIC CORD MICROSURGICAL Left 05/23/2017    Procedure: DENERVATION OF SPERMATIC CORD MICROSURGICAL;;  Surgeon: Marcio Aggarwal MD;  [facility]    DISSECTION RADICAL NECK BILATERAL  08/02/2012    Procedure: DISSECTION RADICAL NECK BILATERAL;;  Surgeon: Yung Alvares MD;  [facility]    ENDOSCOPIC RETROGRADE CHOLANGIOPANCREATOGRAM N/A 05/10/2016    Procedure: COMBINED ENDOSCOPIC RETROGRADE CHOLANGIOPANCREATOGRAPHY, PLACE TUBE/STENT;  Surgeon: Yovanny Beasley MD;  [facility]    ENDOSCOPIC RETROGRADE CHOLANGIOPANCREATOGRAM N/A 03/29/2018    Procedure: ENDOSCOPIC RETROGRADE CHOLANGIOPANCREATOGRAM;  Endoscopic Retrograde Cholangiopancreatogram, Endoscopic Ultrasound, Biliary Sphincterotomy, Biliary and Pancreatic Stent Placement;  Surgeon: Yovanny Beasley MD;  [facility]    ENDOSCOPIC ULTRASOUND UPPER GASTROINTESTINAL TRACT (GI) N/A 02/03/2016    Procedure: ENDOSCOPIC ULTRASOUND, ESOPHAGOSCOPY / UPPER GASTROINTESTINAL TRACT (GI);  Surgeon: Grabiel Plata MD;  [facility]    ENDOSCOPIC ULTRASOUND UPPER GASTROINTESTINAL TRACT (GI) N/A 03/29/2018    Procedure: ENDOSCOPIC ULTRASOUND,  ESOPHAGOSCOPY / UPPER GASTROINTESTINAL TRACT (GI);;  Surgeon: Grabiel Plata MD;  [facility]    ESOPHAGOSCOPY, GASTROSCOPY, DUODENOSCOPY (EGD), COMBINED N/A 02/03/2016    Procedure: COMBINED ENDOSCOPIC ULTRASOUND, ESOPHAGOSCOPY, GASTROSCOPY, DUODENOSCOPY (EGD), FINE NEEDLE ASPIRATE/BIOPSY;  Surgeon: Grabiel Plata MD;  [facility]    ESOPHAGOSCOPY, GASTROSCOPY, DUODENOSCOPY (EGD), COMBINED N/A 06/08/2016    Procedure: COMBINED ESOPHAGOSCOPY, GASTROSCOPY, DUODENOSCOPY (EGD), REMOVE FOREIGN BODY;  Surgeon: Yovanny Beasley MD;  Location:  GI    ESOPHAGOSCOPY, GASTROSCOPY, DUODENOSCOPY (EGD), COMBINED N/A 04/17/2018    Procedure: COMBINED ESOPHAGOSCOPY, GASTROSCOPY, DUODENOSCOPY (EGD), REMOVE FOREIGN BODY;  EGD with stent removal;  Surgeon: Grabiel Plata MD;  Location:  GI    ESOPHAGOSCOPY, GASTROSCOPY, DUODENOSCOPY (EGD), COMBINED N/A 10/26/2022    Procedure: ESOPHAGOGASTRODUODENOSCOPY, WITH BIOPSY;  Surgeon: Jonatan Celeste MD;  Location: [facility]    EXCISE LESION INTRAORAL  06/14/2012    Procedure: EXCISE LESION INTRAORAL;  Wide Local Excision Floor of Mouth, Direct Laryngoscopy, Bilateral Poca's Marsuplization, Split Thickness Skin Graft from right Thigh  Latex Safe;  Surgeon: Gerson Ravi MD;  [facility]    EXCISE LESION INTRAORAL  08/02/2012    Procedure: EXCISE LESION INTRAORAL;  Floor of Mouth Resection, Bilateral Selective Radical Neck Dissection, Tracheostomy, Left Radial Forearm  Free Flap with Alloderm, Nasogastric Feeding Tube Placement,    * Latex Safe*;  Surgeon: Gerson Ravi MD;  [facility]    EXCISE LESION INTRAORAL  12/11/2012    Procedure: EXCISE LESION INTRAORAL;  takedown of oral flap;  Surgeon: Yung Alvares MD;  [facility]    GRAFT FREE VASCULARIZED (LOCATION)  08/02/2012    Procedure: GRAFT FREE VASCULARIZED (LOCATION);;  Surgeon: Yung Alvares MD;  [facility]    GRAFT SKIN SPLIT THICKNESS FROM EXTREMITY  06/14/2012     Procedure: GRAFT SKIN SPLIT THICKNESS FROM EXTREMITY;;  Surgeon: Gerson Ravi MD;  [facility]    IR LOWER EXTREMITY ANGIOGRAM LEFT  9/30/2022    LAPAROSCOPIC ILEOSTOMY TAKEDOWN  06/06/2013    LAPAROSCOPIC ILEOSTOMY TAKEDOWN    LAPAROTOMY EXPLORATORY  12/20/2012    LAPAROTOMY EXPLORATORY with Colectomy    LARYNGOSCOPY  06/14/2012    Procedure: LARYNGOSCOPY;;  Surgeon: Gerson Ravi MD;  [facility]    ORTHOPEDIC SURGERY      ganglian cyst left ankle    PANCREATECTOMY, SPLENECTOMY N/A 03/10/2016    Procedure: PANCREATECTOMY, SPLENECTOMY;  Surgeon: Nael Abel MD;  [facility]    SHOULDER SURGERY  2006, 2008    2006- right rotator cuff, 2008 bone spur on left. Dr. Hdez    VARICOCELECTOMY Left 05/23/2017    Procedure: VARICOCELECTOMY;  Left Varicocele Repair, Denervation of Left Testis;  Surgeon: Marcio Aggarwal MD;  [facility]       ALLERGIES:  Nkda [no known drug allergy]    MEDS:    Current Outpatient Medications:     ASPIRIN NOT PRESCRIBED (INTENTIONAL), Please choose reason not prescribed from choices below., Disp: , Rfl:     atorvastatin (LIPITOR) 80 MG tablet, Take 1 tablet (80 mg) by mouth daily, Disp: 90 tablet, Rfl: 3    clopidogrel (PLAVIX) 75 MG tablet, Take 1 tablet (75 mg) by mouth daily Start taking medication the day after the procedure., Disp: 90 tablet, Rfl: 1    Continuous Blood Gluc  (FREESTYLE LISA 2 READER) JOSE ALBERTO, Use to read blood sugars as per 's instructions., Disp: 1 each, Rfl: 0    Continuous Blood Gluc Sensor (FREESTYLE LIAS 14 DAY SENSOR) MISC, Change every 14 days., Disp: 6 each, Rfl: 3    Continuous Blood Gluc Sensor (FREESTYLE LISA 2 SENSOR) MISC, Change every 14 days., Disp: 2 each, Rfl: 5    diazepam (VALIUM) 5 MG tablet, Take 1 tablet (5 mg) by mouth every 6 hours as needed for anxiety (MUSCLE SPASM), Disp: 20 tablet, Rfl: 0    insulin glargine (LANTUS PEN) 100 UNIT/ML pen, Inject 46 Units Subcutaneous At Bedtime Increase by 2 units every  3 days until blood sugars in am under 120 up to max 60 units daily, Disp: 60 mL, Rfl: 4    insulin NPH (NOVOLIN N FLEXPEN RELION) 100 UNIT/ML injection, Take 40 units of N in the am and 32 units in the pm., Disp: 30 mL, Rfl: 5    insulin pen needle (BD LUIS U/F) 32G X 4 MM miscellaneous, USE PEN NEEDLE ONCE DAILY AS DIRECTED, Disp: 60 each, Rfl: 0    lisinopril (ZESTRIL) 10 MG tablet, Take 1 tablet by mouth once daily, Disp: 90 tablet, Rfl: 3    metoprolol succinate ER (TOPROL XL) 50 MG 24 hr tablet, TAKE 1 & 1/2 (ONE & ONE-HALF) TABLETS BY MOUTH TWICE DAILY, Disp: 270 tablet, Rfl: 1    multivitamin, therapeutic with minerals (THERA-VIT-M) TABS, Take 1 tablet by mouth daily., Disp: 30 each, Rfl: 1    pregabalin (LYRICA) 150 MG capsule, Take 1 capsule by mouth twice daily, Disp: 60 capsule, Rfl: 5    Semaglutide (RYBELSUS) 7 MG tablet, Take 1 tablet (7 mg) by mouth daily, Disp: 90 tablet, Rfl: 1    tamsulosin (FLOMAX) 0.4 MG capsule, Take 1 capsule by mouth once daily, Disp: 90 capsule, Rfl: 2    vitamin B-12 (CYANOCOBALAMIN) 100 MCG tablet, Take 100 mcg by mouth daily, Disp: , Rfl:     Vitamin D3 (CHOLECALCIFEROL) 25 mcg (1000 units) tablet, Take 1 tablet by mouth daily, Disp: , Rfl:     warfarin ANTICOAGULANT (COUMADIN) 2.5 MG tablet, Take 1.25 mg every Mon, Wed, Fri; 2.5 mg all other days or As directed by Anticoagulation clinic, Disp: 80 tablet, Rfl: 1    SOCIAL HABITS:    History   Smoking Status    Former    Packs/day: 1.00    Years: 40.00    Types: Cigarettes    Quit date: 2006   Smokeless Tobacco    Never     Social History    Substance and Sexual Activity      Alcohol use: Yes      History   Drug Use Unknown       FAMILY HISTORY:    Family History   Problem Relation Age of Onset    Diabetes Sister         onset age 50    Alzheimer Disease Mother          80    Alzheimer Disease Father          85    Diabetes Other         nephew type 1    Diabetes Other     Aneurysm Sister     Anesthesia  Reaction No family hx of     Colon Cancer No family hx of     Colon Polyps No family hx of     Crohn's Disease No family hx of     Ulcerative Colitis No family hx of        PE:  There were no vitals taken for this visit.  Wt Readings from Last 1 Encounters:   07/19/23 90.7 kg (200 lb)     There is no height or weight on file to calculate BMI.    EXAM:  GENERAL: This is a well-developed 77 year old male who appears his stated age  EYES: Grossly normal.  MOUTH: Buccal mucosa normal   CARDIAC: Normal   CHEST/LUNG: Clear to auscultation bilaterally  GASTROINTESINAL soft nontender nondistended  MUSCULOSKELETAL: Grossly normal and both lower extremities are intact.  HEME/LYMPH: No lymphedema  NEUROLOGIC: Focally intact, Alert and oriented x 3.   PSYCH: appropriate affect  INTEGUMENT: No open lesions or ulcers  Pulse Exam:           DIAGNOSTIC STUDIES:     Images:  CTA Abdomen Pelvis Bilat Leg Runoff w Contr    Result Date: 7/24/2023  CTA ABDOMEN PELVIS BILAT LEG RUNOFF W CONTRAST  7/24/2023 12:09 PM HISTORY:  Peripheral arterial disease. COMPARISON: CT of the abdomen pelvis dated 12/3/2022 ankle-brachial indices dated 7/24/2023 TECHNIQUE: CT angiogram of the abdomen and pelvis with bilateral lower extremity runoff was performed following the administration of 100 cc intravenous contrast. Images are viewed in multiple planes and 3-D reconstructions were also performed. Radiation dose for this scan was reduced using automated exposure control, adjustment of the mA and/or kV according to patient size, or iterative reconstruction technique. FINDINGS: Vascular exam: Abdominal aorta: There is scattered calcified plaque in the abdominal aorta. No significant stenosis or aneurysmal dilatation of the abdominal aorta. The splenic artery is not visualized. The patient is status post a splenectomy. The celiac trunk, superior mesenteric artery, renal arteries, and inferior mesenteric artery are patent without definite significant  stenoses. Iliac arteries: There is scattered calcified plaque in the iliac arteries. There is a focal dissection in the distal left common iliac artery. No significant stenoses in the iliac arteries are identified. Right lower extremity angiogram: Common femoral artery: Calcified plaque posteriorly. No significant stenosis. Profunda femoris artery: No significant stenosis. Superficial femoral artery: Significant stenoses in the mid and distal thigh. Popliteal artery: No significant stenosis. Tibial arteries: Primarily two-vessel runoff via the peroneal and posterior tibial arteries. Diffuse steno-occlusive disease in the anterior tibial artery. Left lower extremity angiogram: Common femoral artery: Calcified plaque posteriorly. No significant stenosis. Profunda femoris artery: No significant stenosis. Superficial femoral artery: No significant stenosis. Popliteal artery: No significant stenosis. Tibial arteries: Primarily two-vessel runoff via the peroneal and posterior tibial arteries. The distal anterior tibial artery is not well seen. Soft tissue exam: There are cysts in the kidneys. There has been resection of the distal body and tail the pancreas. The spleen has been resected. Remaining visualized solid organs appear grossly unremarkable. There is an partial resection of the colon.     IMPRESSION: 1. Significant stenoses in the mid and distal right superficial femoral artery. SENDY ALCAZAR MD   SYSTEM ID:  [id number]    US ERIN with PPG with Exercise    Result Date: 7/24/2023  US ERIN WITH PPG WITH EXERCISE   7/24/2023 11:21 AM HISTORY: PAD (peripheral artery disease) (H) COMPARISON: None. FINDINGS: Right ERIN: DP: 0.75 PT: 0.76. Left ERIN: DP: 1.31 PT: 1.34. Right Digital brachial index: 0.44. Left Digital Brachial index: 0.61 Waveforms: Monophasic and that right distal tibial arteries. Multiphasic in the left distal tibial arteries. Exercise: The patient walked on a treadmill for 5 minutes at 0.5 miles per hour  and at a 10% incline. Patient had neuropathic pain in the feet bilaterally throughout the course of the exam. Right exercise ERIN: 0.75. Left exercise ERIN: 1.20     IMPRESSION: Moderate arterial insufficiency in the right lower extremity at rest not significantly changed following exercise. ERIN CRITERIA: >0.95 Normal 0.90 - 0.94 Mild 0.5 - 0.89 Moderate 0.2 - 0.49 Severe <0.2 Critical SENDY ALCAZAR MD   SYSTEM ID:  [id number]    US Lower Extremity Venous Duplex Right    Result Date: 7/13/2023  US LOWER EXTREMITY VENOUS DUPLEX RIGHT 7/13/2023 11:40 AM CLINICAL HISTORY/INDICATION: Pain and swelling of the right thigh and calf, rule out DVT. COMPARISON: None relevant TECHNIQUE: Grayscale, color-flow, and spectral waveform analysis were performed of the deep veins of the right lower extremity FINDINGS: The right common femoral vein, femoral vein, popliteal vein and tibial veins demonstrate normal compressibility, spectral waveform, color flow and augmentation. The contralateral left common femoral vein demonstrates normal compressibility, spectral waveform, color flow and augmentation.     IMPRESSION: No evidence of deep venous thrombosis in the right lower extremity. JUDY RIVERS DO   SYSTEM ID:  [id number]     Lower Extremity Arterial Duplex Right    Result Date: 7/13/2023  ARTERIAL DUPLEX ULTRASOUND RIGHT LOWER EXTREMITY   7/13/2023 12:03 PM HISTORY: Peripheral vascular disease, right lower extremity pain. COMPARISON: None. FINDINGS: Color Doppler and spectral waveform analysis was performed throughout the arteries of the right lower extremity. Significant stenosis in the distal superficial femoral artery with a peak systolic velocity of 327 cm/s with minimal color flow noted throughout the vessel. Significant stenosis of the popliteal artery with a diameter of 2 mm; however, there is not an associated elevated velocity. Common femoral, profunda femoral, proximal superficial femoral, mid superficial femoral,  anterior tibial, posterior tibial and peroneal arteries are patent. Waveforms are monophasic throughout, aortoiliac disease.     IMPRESSION: 1. Significant stenosis throughout the distal superficial femoral artery with minimal flow noted. 2. Stenosis of the popliteal artery. 3. Significant atherosclerotic calcification throughout the visualized vessels. 4. Common femoral, profunda femoral, proximal and mid superficial femoral, anterior tibial, posterior tibial and peroneal arteries are patent. Waveforms are monophasic throughout, suggesting aortoiliac disease. Consider CTA for further evaluation. JUDY RIVERS DO   SYSTEM ID:  [id number]      LABS:      Sodium   Date Value Ref Range Status   07/13/2023 139 136 - 145 mmol/L Final   05/11/2023 142 136 - 145 mmol/L Final   01/24/2023 137 136 - 145 mmol/L Final   02/22/2021 136 133 - 144 mmol/L Final   02/18/2021 138 133 - 144 mmol/L Final   02/17/2021 137 133 - 144 mmol/L Final     Urea Nitrogen   Date Value Ref Range Status   07/13/2023 15.4 8.0 - 23.0 mg/dL Final   05/11/2023 15.5 8.0 - 23.0 mg/dL Final   01/24/2023 17.0 8.0 - 23.0 mg/dL Final   07/19/2022 16 7 - 30 mg/dL Final   05/24/2022 14 7 - 30 mg/dL Final   04/15/2022 17 7 - 30 mg/dL Final   02/22/2021 13 7 - 30 mg/dL Final   02/18/2021 12 7 - 30 mg/dL Final   02/17/2021 13 7 - 30 mg/dL Final     Hemoglobin   Date Value Ref Range Status   07/13/2023 15.5 13.3 - 17.7 g/dL Final   11/03/2022 13.6 13.3 - 17.7 g/dL Final   11/02/2022 13.2 (L) 13.3 - 17.7 g/dL Final   02/22/2021 14.0 13.3 - 17.7 g/dL Final   02/18/2021 13.8 13.3 - 17.7 g/dL Final   02/17/2021 13.9 13.3 - 17.7 g/dL Final     Platelet Count   Date Value Ref Range Status   07/13/2023 143 (L) 150 - 450 10e3/uL Final   11/03/2022 186 150 - 450 10e3/uL Final   11/02/2022 169 150 - 450 10e3/uL Final   02/22/2021 246 150 - 450 10e9/L Final   02/18/2021 193 150 - 450 10e9/L Final   02/17/2021 192 150 - 450 10e9/L Final     INR   Date Value Ref Range  Status   07/13/2023 2.16 (H) 0.85 - 1.15 Final   06/15/2023 2.0 (H) 0.9 - 1.1 Final   05/11/2023 2.06 (H) 0.85 - 1.15 Final   04/11/2023 2.5 (H) 0.9 - 1.1 Final   03/20/2023 2.0 (H) 0.9 - 1.1 Final   10/31/2022 1.30 (H) 0.85 - 1.15 Final   06/09/2021 2.50 (H) 0.86 - 1.14 Final     Comment:     This test is intended for monitoring Coumadin therapy.  Results are not   accurate in patients with prolonged INR due to factor deficiency.     05/05/2021 2.70 (H) 0.86 - 1.14 Final     Comment:     This test is intended for monitoring Coumadin therapy.  Results are not   accurate in patients with prolonged INR due to factor deficiency.     04/07/2021 2.70 (H) 0.86 - 1.14 Final     Comment:     This test is intended for monitoring Coumadin therapy.  Results are not   accurate in patients with prolonged INR due to factor deficiency.         30 minutes spent on the day of encounter doing chart review, history and exam, documentation, and further activities as noted.     Lilia Stacy MD, [facility]    VASCULAR SURGERY

## 2023-07-25 NOTE — PROGRESS NOTES
Patient is in clinic today to see MD Mervin Stacy.      Patient is here for a follow up to discuss PAD.    The patient does not smoke.    Pt is currently taking Plavix, Coumadin, and statin.    The patient states he/she are NOT wearing compression .    Swelling is observed in right leg.    Pt rates pain 9/10 located at right calf.    Pts symptoms include swelling, discoloration of leg, and pain with walking at a distance of 200 feet  in Right extremity.    Patients condition is improved.    The provider has been notified that the patient has no complaints.

## 2023-07-27 ENCOUNTER — ANTICOAGULATION THERAPY VISIT (OUTPATIENT)
Dept: ANTICOAGULATION | Facility: CLINIC | Age: 78
End: 2023-07-27

## 2023-07-27 ENCOUNTER — LAB (OUTPATIENT)
Dept: LAB | Facility: CLINIC | Age: 78
End: 2023-07-27
Payer: COMMERCIAL

## 2023-07-27 DIAGNOSIS — I48.91 ATRIAL FIBRILLATION (H): ICD-10-CM

## 2023-07-27 DIAGNOSIS — I48.91 ATRIAL FIBRILLATION (H): Primary | Chronic | ICD-10-CM

## 2023-07-27 DIAGNOSIS — Z79.01 LONG TERM CURRENT USE OF ANTICOAGULANT THERAPY: Chronic | ICD-10-CM

## 2023-07-27 DIAGNOSIS — I48.20 CHRONIC ATRIAL FIBRILLATION (H): ICD-10-CM

## 2023-07-27 LAB — INR BLD: 3.3 (ref 0.9–1.1)

## 2023-07-27 PROCEDURE — 85610 PROTHROMBIN TIME: CPT

## 2023-07-27 PROCEDURE — 36416 COLLJ CAPILLARY BLOOD SPEC: CPT

## 2023-07-27 NOTE — PROGRESS NOTES
ANTICOAGULATION MANAGEMENT     Antoine Russo 77 year old male is on warfarin with supratherapeutic INR result. (Goal INR 2.0-3.0)    Recent labs: (last 7 days)     07/27/23  0651   INR 3.3*       ASSESSMENT     Source(s): Chart Review and Patient/Caregiver Call     Warfarin doses taken: Warfarin taken as instructed  Diet: No new diet changes identified  Medication/supplement changes: None noted  New illness, injury, or hospitalization: Yes: was in ER twice in the last two weeks. Once for LE cellulitis and once for neck spasm  Signs or symptoms of bleeding or clotting: No  Previous result: Therapeutic last 2(+) visits  Additional findings:  patient states his leg is still a little swollen and he is using heat packs per ER provider on 7-19 advice. Pt states infection is cleared up, however. He did take Valium PRN for his neck spasm but no interaction with that per lexicomp. Per patient request and mutual decision making, he prefers to leave maintenance dose alone and eat vit K today with a recheck in one week.       PLAN     Recommended plan for temporary change(s) and ongoing change(s) affecting INR     Dosing Instructions: Continue your current warfarin dose with next INR in 1 week       Summary  As of 7/27/2023      Full warfarin instructions:  1.25 mg every Mon, Wed, Fri; 2.5 mg all other days   Next INR check:  8/3/2023               Telephone call with Poli or Saleem who verbalizes understanding and agrees to plan    Lab visit scheduled    Education provided:   Please call back if any changes to your diet, medications or how you've been taking warfarin  Contact [phone]  with any changes, questions or concerns.     Plan made per [facility] anticoagulation protocol    Aparna Kunz, RN  Anticoagulation Clinic  7/27/2023    _______________________________________________________________________     Anticoagulation Episode Summary       Current INR goal:  2.0-3.0   TTR:  59.8 % (11.7 mo)   Target end date:   Indefinite   Send INR reminders to:  [facility]    Indications    Atrial fibrillation (H) [I48.91]  Long term current use of anticoagulant therapy [Z79.01]  Chronic atrial fibrillation (H) [I48.20]             Comments:               Anticoagulation Care Providers       Provider Role Specialty Phone number    Katie Martino MD Referring Family Medicine [phone]    Brianna Matute APRN CNP Referring Family Medicine [phone]

## 2023-08-03 DIAGNOSIS — E11.40 TYPE 2 DIABETES MELLITUS WITH DIABETIC NEUROPATHY, WITH LONG-TERM CURRENT USE OF INSULIN (H): ICD-10-CM

## 2023-08-03 DIAGNOSIS — Z79.4 TYPE 2 DIABETES MELLITUS WITH DIABETIC NEUROPATHY, WITH LONG-TERM CURRENT USE OF INSULIN (H): ICD-10-CM

## 2023-08-03 RX ORDER — PREGABALIN 150 MG/1
CAPSULE ORAL
Qty: 60 CAPSULE | Refills: 5 | Status: SHIPPED | OUTPATIENT
Start: 2023-08-03 | End: 2024-02-05

## 2023-08-04 ENCOUNTER — ANTICOAGULATION THERAPY VISIT (OUTPATIENT)
Dept: ANTICOAGULATION | Facility: CLINIC | Age: 78
End: 2023-08-04

## 2023-08-04 ENCOUNTER — LAB (OUTPATIENT)
Dept: LAB | Facility: CLINIC | Age: 78
End: 2023-08-04
Payer: COMMERCIAL

## 2023-08-04 DIAGNOSIS — I48.91 ATRIAL FIBRILLATION (H): Primary | Chronic | ICD-10-CM

## 2023-08-04 DIAGNOSIS — I48.91 ATRIAL FIBRILLATION (H): ICD-10-CM

## 2023-08-04 DIAGNOSIS — Z79.01 LONG TERM CURRENT USE OF ANTICOAGULANT THERAPY: Chronic | ICD-10-CM

## 2023-08-04 DIAGNOSIS — I48.20 CHRONIC ATRIAL FIBRILLATION (H): ICD-10-CM

## 2023-08-04 LAB — INR BLD: 3.7 (ref 0.9–1.1)

## 2023-08-04 PROCEDURE — 85610 PROTHROMBIN TIME: CPT

## 2023-08-04 PROCEDURE — 36416 COLLJ CAPILLARY BLOOD SPEC: CPT

## 2023-08-04 NOTE — PROGRESS NOTES
ANTICOAGULATION MANAGEMENT     Antoine Russo 77 year old male is on warfarin with supratherapeutic INR result. (Goal INR 2.0-3.0)    Recent labs: (last 7 days)     08/04/23  0831   INR 3.7*       ASSESSMENT     Source(s): Chart Review and Patient/Caregiver Call     Warfarin doses taken: Warfarin taken as instructed  Diet: No new diet changes identified  Medication/supplement changes: None noted  New illness, injury, or hospitalization: No  Signs or symptoms of bleeding or clotting: No  Previous result: Supratherapeutic  Additional findings:  patient reports no changes with his leg       PLAN     Recommended plan for ongoing change(s) affecting INR     Dosing Instructions: hold dose then decrease your warfarin dose (9.1% change) with next INR in 10 days       Summary  As of 8/4/2023      Full warfarin instructions:  8/4: 1.25 mg; 8/5: Hold; Otherwise 2.5 mg every Mon, Wed, Fri; 1.25 mg all other days   Next INR check:  8/15/2023               Telephone call with Poli or Saleem who verbalizes understanding and agrees to plan    Lab visit scheduled    Education provided:   Please call back if any changes to your diet, medications or how you've been taking warfarin  Contact [phone]  with any changes, questions or concerns.     Plan made per [facility] anticoagulation protocol    Aparna Kunz RN  Anticoagulation Clinic  8/4/2023    _______________________________________________________________________     Anticoagulation Episode Summary       Current INR goal:  2.0-3.0   TTR:  57.5 % (11.7 mo)   Target end date:  Indefinite   Send INR reminders to:  [facility]    Indications    Atrial fibrillation (H) [I48.91]  Long term current use of anticoagulant therapy [Z79.01]  Chronic atrial fibrillation (H) [I48.20]             Comments:               Anticoagulation Care Providers       Provider Role Specialty Phone number    Katie Martino MD Referring Family Medicine [phone]    Brianna Matute,  FERNANDO CNP Referring Family Medicine [phone]

## 2023-08-15 ENCOUNTER — OFFICE VISIT (OUTPATIENT)
Dept: FAMILY MEDICINE | Facility: CLINIC | Age: 78
End: 2023-08-15
Attending: FAMILY MEDICINE
Payer: COMMERCIAL

## 2023-08-15 ENCOUNTER — ANTICOAGULATION THERAPY VISIT (OUTPATIENT)
Dept: ANTICOAGULATION | Facility: CLINIC | Age: 78
End: 2023-08-15

## 2023-08-15 VITALS
DIASTOLIC BLOOD PRESSURE: 60 MMHG | RESPIRATION RATE: 20 BRPM | BODY MASS INDEX: 32.34 KG/M2 | TEMPERATURE: 97.4 F | WEIGHT: 213.4 LBS | SYSTOLIC BLOOD PRESSURE: 138 MMHG | HEIGHT: 68 IN | HEART RATE: 65 BPM | OXYGEN SATURATION: 95 %

## 2023-08-15 DIAGNOSIS — Z79.01 LONG TERM CURRENT USE OF ANTICOAGULANT THERAPY: ICD-10-CM

## 2023-08-15 DIAGNOSIS — Z79.01 LONG TERM CURRENT USE OF ANTICOAGULANT THERAPY: Chronic | ICD-10-CM

## 2023-08-15 DIAGNOSIS — I48.20 CHRONIC ATRIAL FIBRILLATION (H): ICD-10-CM

## 2023-08-15 DIAGNOSIS — Z79.4 TYPE 2 DIABETES MELLITUS WITH DIABETIC NEUROPATHY, WITH LONG-TERM CURRENT USE OF INSULIN (H): Primary | ICD-10-CM

## 2023-08-15 DIAGNOSIS — E78.5 HYPERLIPIDEMIA LDL GOAL <100: ICD-10-CM

## 2023-08-15 DIAGNOSIS — E11.40 TYPE 2 DIABETES MELLITUS WITH DIABETIC NEUROPATHY, WITH LONG-TERM CURRENT USE OF INSULIN (H): Primary | ICD-10-CM

## 2023-08-15 DIAGNOSIS — I48.20 CHRONIC ATRIAL FIBRILLATION (H): Primary | Chronic | ICD-10-CM

## 2023-08-15 DIAGNOSIS — I73.9 PVD (PERIPHERAL VASCULAR DISEASE) (H): ICD-10-CM

## 2023-08-15 LAB
ALBUMIN SERPL BCG-MCNC: 3.3 G/DL (ref 3.5–5.2)
ALP SERPL-CCNC: 84 U/L (ref 40–129)
ALT SERPL W P-5'-P-CCNC: 21 U/L (ref 0–70)
ANION GAP SERPL CALCULATED.3IONS-SCNC: 7 MMOL/L (ref 7–15)
AST SERPL W P-5'-P-CCNC: 25 U/L (ref 0–45)
BASOPHILS # BLD AUTO: 0.1 10E3/UL (ref 0–0.2)
BASOPHILS NFR BLD AUTO: 1 %
BILIRUB SERPL-MCNC: 1 MG/DL
BUN SERPL-MCNC: 15.1 MG/DL (ref 8–23)
CALCIUM SERPL-MCNC: 9.6 MG/DL (ref 8.8–10.2)
CHLORIDE SERPL-SCNC: 101 MMOL/L (ref 98–107)
CHOLEST SERPL-MCNC: 113 MG/DL
CREAT SERPL-MCNC: 1.22 MG/DL (ref 0.67–1.17)
DEPRECATED HCO3 PLAS-SCNC: 30 MMOL/L (ref 22–29)
EOSINOPHIL # BLD AUTO: 0.2 10E3/UL (ref 0–0.7)
EOSINOPHIL NFR BLD AUTO: 2 %
ERYTHROCYTE [DISTWIDTH] IN BLOOD BY AUTOMATED COUNT: 16.5 % (ref 10–15)
GFR SERPL CREATININE-BSD FRML MDRD: 62 ML/MIN/1.73M2
GLUCOSE SERPL-MCNC: 185 MG/DL (ref 70–99)
HBA1C MFR BLD: 8.9 % (ref 0–5.6)
HCT VFR BLD AUTO: 45.4 % (ref 40–53)
HDLC SERPL-MCNC: 35 MG/DL
HGB BLD-MCNC: 14.9 G/DL (ref 13.3–17.7)
HOLD SPECIMEN: NORMAL
HOLD SPECIMEN: NORMAL
IMM GRANULOCYTES # BLD: 0 10E3/UL
IMM GRANULOCYTES NFR BLD: 0 %
INR BLD: 2.7 (ref 0.9–1.1)
LDLC SERPL CALC-MCNC: 54 MG/DL
LYMPHOCYTES # BLD AUTO: 1.6 10E3/UL (ref 0.8–5.3)
LYMPHOCYTES NFR BLD AUTO: 18 %
MCH RBC QN AUTO: 33.7 PG (ref 26.5–33)
MCHC RBC AUTO-ENTMCNC: 32.8 G/DL (ref 31.5–36.5)
MCV RBC AUTO: 103 FL (ref 78–100)
MONOCYTES # BLD AUTO: 1 10E3/UL (ref 0–1.3)
MONOCYTES NFR BLD AUTO: 11 %
NEUTROPHILS # BLD AUTO: 6 10E3/UL (ref 1.6–8.3)
NEUTROPHILS NFR BLD AUTO: 69 %
NONHDLC SERPL-MCNC: 78 MG/DL
PLATELET # BLD AUTO: 157 10E3/UL (ref 150–450)
POTASSIUM SERPL-SCNC: 5.1 MMOL/L (ref 3.4–5.3)
PROT SERPL-MCNC: 7.2 G/DL (ref 6.4–8.3)
RBC # BLD AUTO: 4.42 10E6/UL (ref 4.4–5.9)
SODIUM SERPL-SCNC: 138 MMOL/L (ref 136–145)
TRIGL SERPL-MCNC: 120 MG/DL
TSH SERPL DL<=0.005 MIU/L-ACNC: 1.32 UIU/ML (ref 0.3–4.2)
WBC # BLD AUTO: 8.8 10E3/UL (ref 4–11)

## 2023-08-15 PROCEDURE — 85610 PROTHROMBIN TIME: CPT | Performed by: FAMILY MEDICINE

## 2023-08-15 PROCEDURE — 80061 LIPID PANEL: CPT | Performed by: FAMILY MEDICINE

## 2023-08-15 PROCEDURE — 85025 COMPLETE CBC W/AUTO DIFF WBC: CPT | Performed by: FAMILY MEDICINE

## 2023-08-15 PROCEDURE — 80053 COMPREHEN METABOLIC PANEL: CPT | Performed by: FAMILY MEDICINE

## 2023-08-15 PROCEDURE — 36415 COLL VENOUS BLD VENIPUNCTURE: CPT | Performed by: FAMILY MEDICINE

## 2023-08-15 PROCEDURE — 99214 OFFICE O/P EST MOD 30 MIN: CPT | Performed by: FAMILY MEDICINE

## 2023-08-15 PROCEDURE — 83036 HEMOGLOBIN GLYCOSYLATED A1C: CPT | Performed by: FAMILY MEDICINE

## 2023-08-15 PROCEDURE — 84443 ASSAY THYROID STIM HORMONE: CPT | Performed by: FAMILY MEDICINE

## 2023-08-15 ASSESSMENT — PAIN SCALES - GENERAL: PAINLEVEL: NO PAIN (0)

## 2023-08-15 NOTE — PROGRESS NOTES
"  Assessment & Plan     Type 2 diabetes mellitus with diabetic neuropathy, with long-term current use of insulin (H)  Not well controlled  Increase insulin by 2 units  Discuss with Ruthann  - Hemoglobin A1c; Future  - Comprehensive metabolic panel (BMP + Alb, Alk Phos, ALT, AST, Total. Bili, TP); Future  - CBC with platelets and differential; Future  - TSH with free T4 reflex; Future  - Hemoglobin A1c  - Comprehensive metabolic panel (BMP + Alb, Alk Phos, ALT, AST, Total. Bili, TP)  - CBC with platelets and differential  - TSH with free T4 reflex    PVD (peripheral vascular disease) (H)  Stable  Continue Plavix, atorvastatin    Chronic atrial fibrillation (H)  Stable  Continue warfarin    Long term current use of anticoagulant therapy  INR today    Hyperlipidemia LDL goal <100  Continue statin  - Lipid panel reflex to direct LDL Fasting; Future  - Lipid panel reflex to direct LDL Fasting; Future  - Lipid panel reflex to direct LDL Fasting             BMI:   Estimated body mass index is 32.45 kg/m  as calculated from the following:    Height as of this encounter: 1.727 m (5' 8\").    Weight as of this encounter: 96.8 kg (213 lb 6.4 oz).     Blood sugar testing frequency justification:  Uncontrolled diabetes      Katie Yoder MD  [facility]    Terri Monge is a 77 year old, presenting for the following health issues:  Diabetes and Hypertension        8/15/2023     6:50 AM   Additional Questions   Roomed by Keyanna BARILLAS       History of Present Illness       Diabetes:   He presents for follow up of diabetes.  He is checking home blood glucose three times daily.   He checks blood glucose before and after meals.  Blood glucose is sometimes over 200 and never under 70. He is aware of hypoglycemia symptoms including dizziness and weakness.    He has no concerns regarding his diabetes at this time.  He is having numbness in feet and burning in feet.  The patient has not had a diabetic eye " "exam in the last 12 months.            On insulin 46 units  Lab Results   Component Value Date    A1C 8.7 05/11/2023    A1C 7.4 01/24/2023    A1C 8.2 10/20/2022    A1C 9.2 07/19/2022    A1C 9.1 04/19/2022    A1C 7.1 10/03/2020    A1C 6.3 05/12/2020    A1C 6.5 02/04/2020    A1C 9.0 10/17/2019    A1C 9.7 07/18/2019      Patient has Diabetes Type 2.    Patient is on Continuous Personal CGM and is treated with insulin  Pt requires frequent adjustments to their insulin treatment regimen on the basis of therapeutic CGM testing results.  Pt is checking their blood sugars at least 4 times daily via sensor.  Pt is seen every 3 months for evaluation of diabetes control.    Peripheral vascular disease  On plavix and atorvastatin  Followed by vascular, saw them last month    Afib  On warfarin    hyperlipidemia   On statin  Recent Labs   Lab Test 10/20/22  0736 01/18/22  0657   CHOL 137 116   HDL 36* 30*   LDL 67 48   TRIG 170* 191*        Still drinking a little, just lite beer    BP Readings from Last 6 Encounters:   08/15/23 138/60   07/25/23 131/78   07/19/23 (!) 163/90   07/13/23 (!) 151/92   05/11/23 108/72   01/24/23 122/64        Review of Systems   ROS: 5 point ROS negative except as noted above in HPI, including Gen., Resp., CV, GI &  system review.       Objective    /60   Pulse 65   Temp 97.4  F (36.3  C) (Tympanic)   Resp 20   Ht 1.727 m (5' 8\")   Wt 96.8 kg (213 lb 6.4 oz)   SpO2 95%   BMI 32.45 kg/m    Body mass index is 32.45 kg/m .  Physical Exam   GENERAL: healthy, alert and no distress  NECK: no adenopathy, no asymmetry, no mass, multiple surgical scars,and thyroid normal to palpation  RESP: lungs clear to auscultation - no rales, rhonchi or wheezes  CV: irregularly irregular    ABDOMEN: soft, nontender, no hepatosplenomegaly, no masses and bowel sounds normal  MS: no gross musculoskeletal defects noted, trace edema, mild erythema in his                     "

## 2023-08-15 NOTE — PROGRESS NOTES
ANTICOAGULATION MANAGEMENT     Antoine Russo 77 year old male is on warfarin with therapeutic INR result. (Goal INR 2.0-3.0)    Recent labs: (last 7 days)     08/15/23  0733   INR 2.7*       ASSESSMENT     Source(s): Chart Review and Patient/Caregiver Call     Warfarin doses taken: Warfarin taken as instructed  Diet: No new diet changes identified  Medication/supplement changes: None noted  New illness, injury, or hospitalization: No  Signs or symptoms of bleeding or clotting: No  Previous result: Supratherapeutic  Additional findings: None       PLAN     Recommended plan for no diet, medication or health factor changes affecting INR     Dosing Instructions: Continue your current warfarin dose with next INR in 3 weeks       Summary  As of 8/15/2023      Full warfarin instructions:  2.5 mg every Mon, Wed, Fri; 1.25 mg all other days   Next INR check:  9/7/2023               Telephone call with Poli or Saleem who verbalizes understanding and agrees to plan    Lab visit scheduled    Education provided:   Contact [phone]  with any changes, questions or concerns.     Plan made per [facility] anticoagulation protocol    Maria Victoria Castaneda RN  Anticoagulation Clinic  8/15/2023    _______________________________________________________________________     Anticoagulation Episode Summary       Current INR goal:  2.0-3.0   TTR:  57.4 % (11.7 mo)   Target end date:  Indefinite   Send INR reminders to:  [facility]    Indications    Atrial fibrillation (H) [I48.91]  Long term current use of anticoagulant therapy [Z79.01]  Chronic atrial fibrillation (H) [I48.20]             Comments:               Anticoagulation Care Providers       Provider Role Specialty Phone number    Katie Martino MD Referring Family Medicine [phone]    Brianna Matute APRN CNP Referring Family Medicine [phone]

## 2023-08-31 ENCOUNTER — TELEPHONE (OUTPATIENT)
Dept: EDUCATION SERVICES | Facility: CLINIC | Age: 78
End: 2023-08-31
Payer: COMMERCIAL

## 2023-08-31 NOTE — TELEPHONE ENCOUNTER
Diabetes education contact:    Numbers running higher.  INcreased the Lantus to 50 units daily.  Will assess again in one week. Ruthann Alaniz RD, [facility]    Any diabetes medication initiation or dose changes were made via the [facility] Standing Orders per the patient's referring provider. A copy of this encounter was shared with the provider.

## 2023-09-07 ENCOUNTER — LAB (OUTPATIENT)
Dept: LAB | Facility: CLINIC | Age: 78
End: 2023-09-07
Payer: COMMERCIAL

## 2023-09-07 ENCOUNTER — ANTICOAGULATION THERAPY VISIT (OUTPATIENT)
Dept: ANTICOAGULATION | Facility: CLINIC | Age: 78
End: 2023-09-07

## 2023-09-07 DIAGNOSIS — Z79.01 LONG TERM CURRENT USE OF ANTICOAGULANT THERAPY: Chronic | ICD-10-CM

## 2023-09-07 DIAGNOSIS — I48.20 CHRONIC ATRIAL FIBRILLATION (H): Primary | Chronic | ICD-10-CM

## 2023-09-07 LAB — INR BLD: 2.3 (ref 0.9–1.1)

## 2023-09-07 PROCEDURE — 85610 PROTHROMBIN TIME: CPT

## 2023-09-07 PROCEDURE — 36416 COLLJ CAPILLARY BLOOD SPEC: CPT

## 2023-09-07 NOTE — PROGRESS NOTES
ANTICOAGULATION MANAGEMENT     Antoine Russo 77 year old male is on warfarin with therapeutic INR result. (Goal INR 2.0-3.0)    Recent labs: (last 7 days)     09/07/23  0653   INR 2.3*       ASSESSMENT     Source(s): Chart Review and Patient/Caregiver Call     Warfarin doses taken: Warfarin taken as instructed  Diet: No new diet changes identified  Medication/supplement changes: None noted  New illness, injury, or hospitalization: No  Signs or symptoms of bleeding or clotting: No  Previous result: Therapeutic last visit; previously outside of goal range  Additional findings: None       PLAN     Recommended plan for no diet, medication or health factor changes affecting INR     Dosing Instructions: Continue your current warfarin dose with next INR in 4 weeks       Summary  As of 9/7/2023      Full warfarin instructions:  2.5 mg every Mon, Wed, Fri; 1.25 mg all other days   Next INR check:  10/6/2023               Telephone call with Poli or Saleem who verbalizes understanding and agrees to plan    Lab visit scheduled    Education provided:   Contact [phone]  with any changes, questions or concerns.     Plan made per [facility] anticoagulation protocol    Maria Victoria Castaneda, RN  Anticoagulation Clinic  9/7/2023    _______________________________________________________________________     Anticoagulation Episode Summary       Current INR goal:  2.0-3.0   TTR:  59.8 % (11.7 mo)   Target end date:  Indefinite   Send INR reminders to:  [facility]    Indications    Atrial fibrillation (H) [I48.91]  Long term current use of anticoagulant therapy [Z79.01]  Chronic atrial fibrillation (H) [I48.20]             Comments:               Anticoagulation Care Providers       Provider Role Specialty Phone number    Katie Martino MD Referring Family Medicine [phone]    Brianna Matute APRN CNP Referring Family Medicine [phone]

## 2023-09-14 ENCOUNTER — TRANSFERRED RECORDS (OUTPATIENT)
Dept: HEALTH INFORMATION MANAGEMENT | Facility: CLINIC | Age: 78
End: 2023-09-14
Payer: COMMERCIAL

## 2023-09-14 LAB — RETINOPATHY: POSITIVE

## 2023-10-05 DIAGNOSIS — E11.40 TYPE 2 DIABETES MELLITUS WITH DIABETIC NEUROPATHY, WITH LONG-TERM CURRENT USE OF INSULIN (H): ICD-10-CM

## 2023-10-05 DIAGNOSIS — Z79.4 TYPE 2 DIABETES MELLITUS WITH DIABETIC NEUROPATHY, WITH LONG-TERM CURRENT USE OF INSULIN (H): ICD-10-CM

## 2023-10-05 RX ORDER — FLASH GLUCOSE SENSOR
KIT MISCELLANEOUS
Qty: 6 EACH | Refills: 1 | Status: SHIPPED | OUTPATIENT
Start: 2023-10-05 | End: 2024-01-15

## 2023-10-06 ENCOUNTER — ANTICOAGULATION THERAPY VISIT (OUTPATIENT)
Dept: ANTICOAGULATION | Facility: CLINIC | Age: 78
End: 2023-10-06

## 2023-10-06 ENCOUNTER — LAB (OUTPATIENT)
Dept: LAB | Facility: CLINIC | Age: 78
End: 2023-10-06
Payer: COMMERCIAL

## 2023-10-06 DIAGNOSIS — Z79.01 LONG TERM CURRENT USE OF ANTICOAGULANT THERAPY: Chronic | ICD-10-CM

## 2023-10-06 DIAGNOSIS — I48.91 ATRIAL FIBRILLATION (H): Primary | Chronic | ICD-10-CM

## 2023-10-06 DIAGNOSIS — I48.20 CHRONIC ATRIAL FIBRILLATION (H): ICD-10-CM

## 2023-10-06 LAB — INR BLD: 2.2 (ref 0.9–1.1)

## 2023-10-06 PROCEDURE — 85610 PROTHROMBIN TIME: CPT

## 2023-10-06 PROCEDURE — 36416 COLLJ CAPILLARY BLOOD SPEC: CPT

## 2023-10-06 NOTE — PROGRESS NOTES
ANTICOAGULATION MANAGEMENT     Antoine Russo 77 year old male is on warfarin with therapeutic INR result. (Goal INR 2.0-3.0)    Recent labs: (last 7 days)     10/06/23  0646   INR 2.2*       ASSESSMENT     Source(s): Chart Review and Patient/Caregiver Call     Warfarin doses taken: Warfarin taken as instructed  Diet: No new diet changes identified  Medication/supplement changes: None noted  New illness, injury, or hospitalization: No  Signs or symptoms of bleeding or clotting: No  Previous result: Therapeutic last 2(+) visits  Additional findings: None       PLAN     Recommended plan for no diet, medication or health factor changes affecting INR     Dosing Instructions: Continue your current warfarin dose with next INR in 5 weeks       Summary  As of 10/6/2023      Full warfarin instructions:  2.5 mg every Mon, Wed, Fri; 1.25 mg all other days   Next INR check:  11/10/2023               Telephone call with Poli or Saleem who verbalizes understanding and agrees to plan    Lab visit scheduled    Education provided:   Please call back if any changes to your diet, medications or how you've been taking warfarin  Contact [phone]  with any changes, questions or concerns.     Plan made per [facility] anticoagulation protocol    Aparna Kunz RN  Anticoagulation Clinic  10/6/2023    _______________________________________________________________________     Anticoagulation Episode Summary       Current INR goal:  2.0-3.0   TTR:  66.3 % (11.7 mo)   Target end date:  Indefinite   Send INR reminders to:  [facility]    Indications    Atrial fibrillation (H) [I48.91]  Long term current use of anticoagulant therapy [Z79.01]  Chronic atrial fibrillation (H) [I48.20]             Comments:               Anticoagulation Care Providers       Provider Role Specialty Phone number    Katie Martino MD Referring Family Medicine [phone]    Brianna Matute APRN CNP Referring Family Medicine [phone]

## 2023-10-23 NOTE — TELEPHONE ENCOUNTER
ANTICOAGULATION MANAGEMENT      Antoine Russo due for annual renewal of referral to anticoagulation monitoring. Order pended for your review and signature.      ANTICOAGULATION SUMMARY      Warfarin indication(s)     Atrial fibrillation    Heart valve present?  NO       Current goal range   INR: 2.0-3.0     Goal appropriate for indication? Yes, INR 2-3 appropriate for hx of DVT, PE, hypercoagulable state, Afib, LVAD, or bileaflet AVR without risk factors     Current duration of therapy Indefinite/long term therapy   Time in Therapeutic Range (TTR)  (Goal > 60%) 73.3 %       Office visit with referring provider's group within last year yes on 2/22/21       Angela Harrell RN         Doxepin Counseling:  Patient advised that the medication is sedating and not to drive a car after taking this medication. Patient informed of potential adverse effects including but not limited to dry mouth, urinary retention, and blurry vision.  The patient verbalized understanding of the proper use and possible adverse effects of doxepin.  All of the patient's questions and concerns were addressed.

## 2023-10-24 NOTE — PLAN OF CARE
"Obs. A&O. Up independently. IV Dilaudid x 1 this morning for pain control. CIWA q4h = O.  & 150. IV infusing continuous LR @ 125 ml/hr. No reports of N&V. LS-diminished. Baseline neuropathy present in BLE. Wife was here to visit. /75 (BP Location: Right arm)   Pulse 84   Temp 98.9  F (37.2  C) (Oral)   Resp 16   Ht 1.778 m (5' 10\")   Wt 91.5 kg (201 lb 11.5 oz)   SpO2 90%   BMI 28.94 kg/m  .       Doris Haromn RN on 10/5/2020 at 1:53 PM    " Nostril Rim Text: The closure involved the nostril rim.

## 2023-10-29 ENCOUNTER — HEALTH MAINTENANCE LETTER (OUTPATIENT)
Age: 78
End: 2023-10-29

## 2023-10-29 DIAGNOSIS — I10 HYPERTENSION, BENIGN ESSENTIAL, GOAL BELOW 140/90: ICD-10-CM

## 2023-10-30 RX ORDER — METOPROLOL SUCCINATE 50 MG/1
TABLET, EXTENDED RELEASE ORAL
Qty: 270 TABLET | Refills: 1 | Status: SHIPPED | OUTPATIENT
Start: 2023-10-30 | End: 2024-04-22

## 2023-11-05 DIAGNOSIS — N40.0 HYPERTROPHY OF PROSTATE WITHOUT URINARY OBSTRUCTION: ICD-10-CM

## 2023-11-05 DIAGNOSIS — R39.15 URGENCY OF URINATION: ICD-10-CM

## 2023-11-06 RX ORDER — TAMSULOSIN HYDROCHLORIDE 0.4 MG/1
CAPSULE ORAL
Qty: 90 CAPSULE | Refills: 2 | Status: SHIPPED | OUTPATIENT
Start: 2023-11-06 | End: 2024-07-22

## 2023-11-06 NOTE — TELEPHONE ENCOUNTER
"Prescription approved per [facility] Refill Protocol.       Requested Prescriptions   Pending Prescriptions Disp Refills    tamsulosin (FLOMAX) 0.4 MG capsule [Pharmacy Med Name: Tamsulosin HCl 0.4 MG Oral Capsule] 90 capsule 2     Sig: Take 1 capsule by mouth once daily       Alpha Blockers Passed - 11/5/2023  4:59 PM        Passed - Blood pressure under 140/90 in past 12 months     BP Readings from Last 3 Encounters:   08/15/23 138/60   07/25/23 131/78   07/19/23 (!) 163/90                 Passed - Recent (12 mo) or future (30 days) visit within the authorizing provider's specialty     Patient has had an office visit with the authorizing provider or a provider within the authorizing providers department within the previous 12 mos or has a future within next 30 days. See \"Patient Info\" tab in inbasket, or \"Choose Columns\" in Meds & Orders section of the refill encounter.              Passed - Patient does not have Tadalafil, Vardenafil, or Sildenafil on their medication list        Passed - Medication is active on med list        Passed - Patient is 18 years of age or older                 Yolanda Vogel RN 11/06/23 1:24 PM   "

## 2023-11-09 ENCOUNTER — LAB (OUTPATIENT)
Dept: LAB | Facility: CLINIC | Age: 78
End: 2023-11-09
Payer: COMMERCIAL

## 2023-11-09 ENCOUNTER — ANTICOAGULATION THERAPY VISIT (OUTPATIENT)
Dept: ANTICOAGULATION | Facility: CLINIC | Age: 78
End: 2023-11-09

## 2023-11-09 DIAGNOSIS — I48.20 CHRONIC ATRIAL FIBRILLATION (H): Primary | Chronic | ICD-10-CM

## 2023-11-09 DIAGNOSIS — Z79.01 LONG TERM CURRENT USE OF ANTICOAGULANT THERAPY: Chronic | ICD-10-CM

## 2023-11-09 LAB — INR BLD: 2.2 (ref 0.9–1.1)

## 2023-11-09 PROCEDURE — 36416 COLLJ CAPILLARY BLOOD SPEC: CPT

## 2023-11-09 PROCEDURE — 85610 PROTHROMBIN TIME: CPT

## 2023-11-09 NOTE — PROGRESS NOTES
ANTICOAGULATION MANAGEMENT     Antoine Russo 77 year old male is on warfarin with therapeutic INR result. (Goal INR 2.0-3.0)    Recent labs: (last 7 days)     11/09/23  0657   INR 2.2*       ASSESSMENT     Source(s): Chart Review and Patient/Caregiver Call     Warfarin doses taken: Warfarin taken as instructed  Diet: No new diet changes identified  Medication/supplement changes: None noted  New illness, injury, or hospitalization: No  Signs or symptoms of bleeding or clotting: No  Previous result: Therapeutic last 2(+) visits  Additional findings: None       PLAN     Recommended plan for no diet, medication or health factor changes affecting INR     Dosing Instructions: Continue your current warfarin dose with next INR in 6 weeks       Summary  As of 11/9/2023      Full warfarin instructions:  2.5 mg every Mon, Wed, Fri; 1.25 mg all other days   Next INR check:  12/21/2023               Telephone call with Poli or Saleem who verbalizes understanding and agrees to plan    Lab visit scheduled    Education provided:   Contact [phone]  with any changes, questions or concerns.     Plan made per [facility] anticoagulation protocol    Maria Victoria Castaneda, RN  Anticoagulation Clinic  11/9/2023    _______________________________________________________________________     Anticoagulation Episode Summary       Current INR goal:  2.0-3.0   TTR:  72.2% (1 y)   Target end date:  Indefinite   Send INR reminders to:  [facility]    Indications    Atrial fibrillation (H) [I48.91]  Long term current use of anticoagulant therapy [Z79.01]  Chronic atrial fibrillation (H) [I48.20]             Comments:               Anticoagulation Care Providers       Provider Role Specialty Phone number    Katie Martino MD Referring Family Medicine [phone]    Brianna Matute APRN CNP Referring Family Medicine [phone]

## 2023-11-14 ENCOUNTER — OFFICE VISIT (OUTPATIENT)
Dept: FAMILY MEDICINE | Facility: CLINIC | Age: 78
End: 2023-11-14
Payer: COMMERCIAL

## 2023-11-14 VITALS
HEIGHT: 69 IN | OXYGEN SATURATION: 92 % | WEIGHT: 220 LBS | HEART RATE: 63 BPM | SYSTOLIC BLOOD PRESSURE: 139 MMHG | DIASTOLIC BLOOD PRESSURE: 84 MMHG | BODY MASS INDEX: 32.58 KG/M2 | TEMPERATURE: 97.3 F | RESPIRATION RATE: 20 BRPM

## 2023-11-14 DIAGNOSIS — H61.23 BILATERAL IMPACTED CERUMEN: ICD-10-CM

## 2023-11-14 DIAGNOSIS — E11.40 TYPE 2 DIABETES MELLITUS WITH DIABETIC NEUROPATHY, WITH LONG-TERM CURRENT USE OF INSULIN (H): ICD-10-CM

## 2023-11-14 DIAGNOSIS — I48.20 CHRONIC ATRIAL FIBRILLATION (H): ICD-10-CM

## 2023-11-14 DIAGNOSIS — I10 HYPERTENSION, BENIGN ESSENTIAL, GOAL BELOW 140/90: ICD-10-CM

## 2023-11-14 DIAGNOSIS — E78.5 HYPERLIPIDEMIA LDL GOAL <100: ICD-10-CM

## 2023-11-14 DIAGNOSIS — Z00.00 ENCOUNTER FOR MEDICARE ANNUAL WELLNESS EXAM: Primary | ICD-10-CM

## 2023-11-14 DIAGNOSIS — Z79.4 TYPE 2 DIABETES MELLITUS WITH DIABETIC NEUROPATHY, WITH LONG-TERM CURRENT USE OF INSULIN (H): ICD-10-CM

## 2023-11-14 DIAGNOSIS — N18.2 CHRONIC KIDNEY DISEASE, STAGE 2 (MILD): ICD-10-CM

## 2023-11-14 DIAGNOSIS — Z12.11 SCREEN FOR COLON CANCER: ICD-10-CM

## 2023-11-14 DIAGNOSIS — F10.20 ALCOHOL DEPENDENCE, UNCOMPLICATED (H): ICD-10-CM

## 2023-11-14 DIAGNOSIS — Z79.01 LONG TERM CURRENT USE OF ANTICOAGULANT THERAPY: ICD-10-CM

## 2023-11-14 LAB
CREAT UR-MCNC: 76.2 MG/DL
HBA1C MFR BLD: 9.1 % (ref 0–5.6)
MICROALBUMIN UR-MCNC: 917.4 MG/L
MICROALBUMIN/CREAT UR: 1203.94 MG/G CR (ref 0–17)

## 2023-11-14 PROCEDURE — 69209 REMOVE IMPACTED EAR WAX UNI: CPT | Mod: 50 | Performed by: FAMILY MEDICINE

## 2023-11-14 PROCEDURE — G0438 PPPS, INITIAL VISIT: HCPCS | Performed by: FAMILY MEDICINE

## 2023-11-14 PROCEDURE — 82043 UR ALBUMIN QUANTITATIVE: CPT | Performed by: FAMILY MEDICINE

## 2023-11-14 PROCEDURE — 99214 OFFICE O/P EST MOD 30 MIN: CPT | Mod: 25 | Performed by: FAMILY MEDICINE

## 2023-11-14 PROCEDURE — 82570 ASSAY OF URINE CREATININE: CPT | Performed by: FAMILY MEDICINE

## 2023-11-14 PROCEDURE — 36415 COLL VENOUS BLD VENIPUNCTURE: CPT | Performed by: FAMILY MEDICINE

## 2023-11-14 PROCEDURE — 83036 HEMOGLOBIN GLYCOSYLATED A1C: CPT | Performed by: FAMILY MEDICINE

## 2023-11-14 RX ORDER — RESPIRATORY SYNCYTIAL VIRUS VACCINE 120MCG/0.5
0.5 KIT INTRAMUSCULAR ONCE
Qty: 1 EACH | Refills: 0 | Status: CANCELLED | OUTPATIENT
Start: 2023-11-14 | End: 2023-11-14

## 2023-11-14 ASSESSMENT — PAIN SCALES - GENERAL: PAINLEVEL: NO PAIN (0)

## 2023-11-14 NOTE — PROGRESS NOTES
"  Assessment & Plan     Encounter for Medicare annual wellness exam  Declines immunizations    Type 2 diabetes mellitus with diabetic neuropathy, with long-term current use of insulin (H)  Uncontrolled  Would like to add GLP-1 agonist but not affordable  Increase lantus by 2 units, will discuss with Ruthann, may need to add back Novolog or glipizide  - Hemoglobin A1c; Future  - Hemoglobin A1c  - Basic metabolic panel  (Ca, Cl, CO2, Creat, Gluc, K, Na, BUN); Future    Hypertension, benign essential, goal below 140/90  Well controlled  Continue lisinopril, metoprolol    Chronic atrial fibrillation (H)  Long term current use of anticoagulant therapy  Continue metoprolol and warfarin    Hyperlipidemia LDL goal <100  Continue statin    Chronic kidney disease, stage 2 (mild)  Recheck today  - Albumin Random Urine Quantitative with Creat Ratio; Future  - Albumin Random Urine Quantitative with Creat Ratio  - Basic metabolic panel  (Ca, Cl, CO2, Creat, Gluc, K, Na, BUN); Future    Alcohol dependence, uncomplicated (H)  Discussed    Bilateral impacted cerumen  Ear lavage done today    Patient Instructions   Increase insulin by 2 units  Talk to Ruthann        Patient Education  Personalized Prevention Plan  You are due for the preventive services outlined below.  Your care team is available to assist you in scheduling these services.  If you have already completed any of these items, please share that information with your care team to update in your medical record.  Health Maintenance Due   Topic Date Due    URINE DRUG SCREEN  Never done    COVID-19 Vaccine (1) Never done    RSV VACCINE (Pregnancy & 60+) (1 - 1-dose 60+ series) Never done    Colorectal Cancer Screening  12/18/2022    Diabetic Foot Exam  04/19/2023    Flu Vaccine (1) Never done    Kidney Microalbumin Urine Test  10/20/2023                BMI:   Estimated body mass index is 32.49 kg/m  as calculated from the following:    Height as of this encounter: 1.753 m (5' 9\").   "  Weight as of this encounter: 99.8 kg (220 lb).     Blood sugar testing frequency justification:  Uncontrolled diabetes, Adjustment of medication(s), and Risk of hypoglycemia with medication(s)      Katie Yoder MD  [facility]    Terri Monge is a 77 year old, presenting for the following health issues:  Diabetes      11/14/2023     6:54 AM   Additional Questions   Roomed by Margarita       History of Present Illness       Diabetes:   He presents for follow up of diabetes.  He is checking home blood glucose four or more times daily.   He checks blood glucose before and after meals.  Blood glucose is sometimes over 200 and never under 70. He is aware of hypoglycemia symptoms including dizziness and weakness.    He has no concerns regarding his diabetes at this time.  He is having numbness in feet and burning in feet.            Hyperlipidemia:  He presents for follow up of hyperlipidemia.   He is taking medication to lower cholesterol. He is not having myalgia or other side effects to statin medications.    Hypertension: He presents for follow up of hypertension.  He does not check blood pressure  regularly outside of the clinic. Outpatient blood pressures have not been over 140/90. He does not follow a low salt diet.     He eats 0-1 servings of fruits and vegetables daily.He consumes 0 sweetened beverage(s) daily.He exercises with enough effort to increase his heart rate 9 or less minutes per day.  He exercises with enough effort to increase his heart rate 3 or less days per week.   He is taking medications regularly.     diabetes   On lantus 50 units,   Has been eating too much, too many carbs  Lab Results   Component Value Date    A1C 9.1 11/14/2023    A1C 8.9 08/15/2023    A1C 8.7 05/11/2023    A1C 7.4 01/24/2023    A1C 8.2 10/20/2022    A1C 7.1 10/03/2020    A1C 6.3 05/12/2020    A1C 6.5 02/04/2020    A1C 9.0 10/17/2019    A1C 9.7 07/18/2019      Patient has Diabetes  "Type 2.    Patient is on Continuous Personal CGM and is treated with insulin  Pt requires frequent adjustments to their insulin treatment regimen on the basis of therapeutic CGM testing results.  Pt is checking their blood sugars at least 4 times daily via sensor.  Pt is seen every 3 months for evaluation of diabetes control.     Peripheral vascular disease  On plavix and atorvastatin  Followed by vascular    Left ear is plugged    hypertension   On lisinopril, metoprolol  BP Readings from Last 6 Encounters:   11/14/23 139/84   08/15/23 138/60   07/25/23 131/78   07/19/23 (!) 163/90   07/13/23 (!) 151/92   05/11/23 108/72      Afib  On metoprolol and warfarin    hyperlipidemia   On statin  Recent Labs   Lab Test 08/15/23  0733 10/20/22  0736   CHOL 113 137   HDL 35* 36*   LDL 54 67   TRIG 120 170*          Annual Wellness Visit    Patient has been advised of split billing requirements and indicates understanding: Yes     Are you in the first 12 months of your Medicare Part B coverage?  No    Physical Health:  In general, how would you rate your overall physical health? good  Outside of work, how many days during the week do you exercise?none  Outside of work, approximately how many minutes a day do you exercise?not applicable  If you drink alcohol do you typically have >3 drinks per day or >7 drinks per week? Yes - AUDIT SCORE:          No data to display              Do you usually eat at least 4 servings of fruit and vegetables a day, include whole grains & fiber and avoid regularly eating high fat or \"junk\" foods? No  Do you have any problems taking medications regularly? No  Do you have any side effects from medications? none  Needs assistance for the following daily activities: no assistance needed  Which of the following safety concerns are present in your home?  none identified   Hearing impairment: No  In the past 6 months, have you been bothered by leaking of urine? no    Mental Health:  In general, how " would you rate your overall mental or emotional health? good        Do you feel safe in your environment? Yes    Have you ever done Advance Care Planning? (For example, a Health Directive, POLST, or a discussion with a medical provider or your loved ones about your wishes)? Yes, advance care planning is on file.    Fall risk:  Fallen 2 or more times in the past year?: No  Any fall with injury in the past year?: No    Cognitive Screenin) Repeat 3 items (Leader, Season, Table)    2) Clock draw: NORMAL  3) 3 item recall: Recalls 2 objects   Results: NORMAL clock, 1-2 items recalled: COGNITIVE IMPAIRMENT LESS LIKELY    Mini-CogTM Copyright S [employer]. Licensed by the author for use in [facility]; reprinted with permission (soob@[facility]). All rights reserved.      Do you have sleep apnea, excessive snoring or daytime drowsiness? : no    Social History     Tobacco Use    Smoking status: Former     Packs/day: 1.00     Years: 40.00     Additional pack years: 0.00     Total pack years: 40.00     Types: Cigarettes     Quit date: 2006     Years since quittin.9    Smokeless tobacco: Never   Substance Use Topics    Alcohol use: Yes             2021     7:01 AM   Alcohol Use   Prescreen: >3 drinks/day or >7 drinks/week? No     Do you have a current opioid prescription? No  Do you use any other controlled substances or medications that are not prescribed by a provider? Alcohol    Current providers sharing in care for this patient include:   Patient Care Team:  Katie Martino MD as PCP - General (Family Practice)  Andrea García MD as MD (ENT)  Grabiel Plata MD as MD (Gastroenterology)  Marcio Aggarwal MD as MD (Urology)  TEJINDER Morales MD as MD (Urology)  Lilian Churchill, RN as Registered Nurse (Urology)  Katie Martino MD as Assigned PCP  Felipa Parnell RD as Diabetes Educator (Dietitian, Registered)  Anais Guajardo MD as MD (Neurology)  Tawanna  Lilia Quintero MD as Assigned Heart and Vascular Provider  Ivette Bell RPH as Pharmacist (Pharmacist)  Ivette Bell RPH as Assigned [place] Pharmacist  Jonatan Celeste MD as Assigned Surgical Provider  Ruthann Alaniz RD as Diabetes Educator (Dietitian, Registered)    The following health maintenance items are reviewed in Epic and correct as of today:  Health Maintenance   Topic Date Due    URINE DRUG SCREEN  Never done    COVID-19 Vaccine (1) Never done    RSV VACCINE (Pregnancy & 60+) (1 - 1-dose 60+ series) Never done    MEDICARE ANNUAL WELLNESS VISIT  07/13/2022    COLORECTAL CANCER SCREENING  12/18/2022    DIABETIC FOOT EXAM  04/19/2023    INFLUENZA VACCINE (1) Never done    MICROALBUMIN  10/20/2023    ANNUAL REVIEW OF HM ORDERS  01/24/2024    A1C  05/14/2024    BMP  08/15/2024    LIPID  08/15/2024    EYE EXAM  09/14/2024    FALL RISK ASSESSMENT  11/14/2024    ADVANCE CARE PLANNING  07/14/2026    MENINGITIS IMMUNIZATION (3 - Risk 2-dose series) 07/19/2027    DTAP/TDAP/TD IMMUNIZATION (2 - Td or Tdap) 03/12/2029    HEPATITIS C SCREENING  Completed    PHQ-2 (once per calendar year)  Completed    Pneumococcal Vaccine: 65+ Years  Completed    URINALYSIS  Completed    ZOSTER IMMUNIZATION  Completed    AORTIC ANEURYSM SCREENING (SYSTEM ASSIGNED)  Completed    IPV IMMUNIZATION  Aged Out    HPV IMMUNIZATION  Aged Out    RSV MONOCLONAL ANTIBODY  Aged Out    LUNG CANCER SCREENING  Discontinued       Patient has been advised of split billing requirements and indicates understanding: Yes    Appropriate preventive services were discussed with this patient, including applicable screening as appropriate for fall prevention, nutrition, physical activity, Tobacco-use cessation, weight loss and cognition.  Checklist reviewing preventive services available has been given to the patient.        Review of Systems   Constitutional, HEENT, cardiovascular, pulmonary, gi and gu systems are negative, except as  "otherwise noted.      Objective    /84 (BP Location: Right arm)   Pulse 63   Temp 97.3  F (36.3  C) (Tympanic)   Resp 20   Ht 1.753 m (5' 9\")   Wt 99.8 kg (220 lb)   SpO2 92%   BMI 32.49 kg/m    Body mass index is 32.49 kg/m .  Physical Exam   GENERAL: healthy, alert and no distress  NECK: no adenopathy, no asymmetry, masses, or scars and thyroid normal to palpation  RESP: lungs clear to auscultation - no rales, rhonchi or wheezes  CV: regular rate and rhythm, normal S1 S2, no S3 or S4, no murmur, click or rub, no peripheral edema and peripheral pulses strong  ABDOMEN: soft, nontender, no hepatosplenomegaly, no masses and bowel sounds normal  MS: no gross musculoskeletal defects noted, no edema                  "

## 2023-11-14 NOTE — PATIENT INSTRUCTIONS
Increase insulin by 2 units  Talk to Ruthann        Patient Education   Personalized Prevention Plan  You are due for the preventive services outlined below.  Your care team is available to assist you in scheduling these services.  If you have already completed any of these items, please share that information with your care team to update in your medical record.  Health Maintenance Due   Topic Date Due    URINE DRUG SCREEN  Never done    COVID-19 Vaccine (1) Never done    RSV VACCINE (Pregnancy & 60+) (1 - 1-dose 60+ series) Never done    Colorectal Cancer Screening  12/18/2022    Diabetic Foot Exam  04/19/2023    Flu Vaccine (1) Never done    Kidney Microalbumin Urine Test  10/20/2023

## 2023-11-20 DIAGNOSIS — R80.1 PERSISTENT PROTEINURIA: ICD-10-CM

## 2023-11-20 DIAGNOSIS — E11.40 TYPE 2 DIABETES MELLITUS WITH DIABETIC NEUROPATHY, WITH LONG-TERM CURRENT USE OF INSULIN (H): ICD-10-CM

## 2023-11-20 DIAGNOSIS — I10 HYPERTENSION, BENIGN ESSENTIAL, GOAL BELOW 140/90: ICD-10-CM

## 2023-11-20 DIAGNOSIS — Z79.4 TYPE 2 DIABETES MELLITUS WITH DIABETIC NEUROPATHY, WITH LONG-TERM CURRENT USE OF INSULIN (H): ICD-10-CM

## 2023-11-20 DIAGNOSIS — N18.2 CHRONIC KIDNEY DISEASE, STAGE 2 (MILD): ICD-10-CM

## 2023-11-20 RX ORDER — LISINOPRIL 20 MG/1
20 TABLET ORAL DAILY
Qty: 90 TABLET | Refills: 1 | Status: SHIPPED | OUTPATIENT
Start: 2023-11-20 | End: 2023-12-31

## 2023-11-29 ENCOUNTER — OFFICE VISIT (OUTPATIENT)
Dept: DERMATOLOGY | Facility: CLINIC | Age: 78
End: 2023-11-29
Payer: COMMERCIAL

## 2023-11-29 DIAGNOSIS — D23.9 DERMAL NEVUS: ICD-10-CM

## 2023-11-29 DIAGNOSIS — L82.1 SEBORRHEIC KERATOSES: ICD-10-CM

## 2023-11-29 DIAGNOSIS — D18.01 ANGIOMA OF SKIN: ICD-10-CM

## 2023-11-29 DIAGNOSIS — L81.4 LENTIGO: Primary | ICD-10-CM

## 2023-11-29 PROCEDURE — 99213 OFFICE O/P EST LOW 20 MIN: CPT | Performed by: DERMATOLOGY

## 2023-11-29 NOTE — LETTER
11/29/2023         RE: Antoine Russo  [address] [id number]        Dear Colleague,    Thank you for referring your patient, Antoine Russo, to the [facility]. Please see a copy of my visit note below.    Antoine Russo is an extremely pleasant 77 year old year old male patient here today for spot on cheek.   .   Patient states this has been present for a while.  Patient reports the following symptoms:  growing.  Patient reports the following previous treatments cryo and shave.  .  These treatments did not work.  Patient reports the following modifying factors none.  Associated symptoms: none.  Patient has no other skin complaints today.  Remainder of the HPI, Meds, PMH, Allergies, FH, and SH was reviewed in chart.      Past Medical History:   Diagnosis Date     Acute gastritis 10/3/2020     Acute kidney injury (H24) 04/17/2019     Acute on chronic pancreatitis (H) 01/23/2018     Acute pancreatitis 10/21/2018     Acute recurrent pancreatitis 10/17/2020     Acute right-sided low back pain with left-sided sciatica 2/4/2020     Bacteriuria with pyuria 04/17/2019     C. difficile colitis      Chronic atrial fibrillation (H)     On chronic anticoagulation with coumadin     Colon polyp 08/26/2011    Colonoscopy 8/2011-A sessile polyp was found in the cecum. The polyp was 6 mm in size. The polyp was removed with a hot snare. Resection and retrieval were complete. A sessile polyp was found in the proximal transverse colon. The polyp was 15 mm in size. The polyp was removed with a hot snare. Resection and retrieval were complete. A sessile polyp was found in the sigmoid colon. The polyp was 5 mm     Dehydration 2/16/2021     Diabetes mellitus (H)     type 2     Groin fluid collection 12/15/2012    CT 12/12- New (since 5/11) collection measuring at least 2.3 cm in diameter and 6.5 cm in length within the right inguinal canal. 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, N0, M0 carcinoma, underwent transcervical glossectomy, floor of mouth excision, BL neck dissection, adj radiation, and skin flap reconstruction     Pancreatic duct leak 5/3/2016     Recurrent pancreatitis     alcoholic pancreatitis       Past Surgical History:   Procedure Laterality Date     BREAST SURGERY  01/01/2008    right breast mass benign     COLECTOMY SUBTOTAL  2013    With diverting ostomy creation; done for toxic C difficile colitis     COLONOSCOPY      multiple polyps removed     COLONOSCOPY  08/24/2011    TUMOR/POLYP/LESION BY SNARE     COLONOSCOPY  12/17/2012    COLONOSCOPY     COLONOSCOPY  12/18/2012    COLONOSCOPY     DENERVATION OF SPERMATIC CORD MICROSURGICAL Left 05/23/2017    Procedure: DENERVATION OF SPERMATIC CORD MICROSURGICAL;;  Surgeon: Marcio Aggarwal MD;  [facility]     DISSECTION RADICAL NECK BILATERAL  08/02/2012    Procedure: DISSECTION RADICAL NECK BILATERAL;;  Surgeon: Yung Alvares MD;  [facility]     ENDOSCOPIC RETROGRADE CHOLANGIOPANCREATOGRAM N/A 05/10/2016    Procedure: COMBINED ENDOSCOPIC RETROGRADE CHOLANGIOPANCREATOGRAPHY, PLACE TUBE/STENT;  Surgeon: Yovanny Beasley MD;  [facility]     ENDOSCOPIC RETROGRADE CHOLANGIOPANCREATOGRAM N/A 03/29/2018    Procedure: ENDOSCOPIC RETROGRADE CHOLANGIOPANCREATOGRAM;  Endoscopic Retrograde Cholangiopancreatogram, Endoscopic Ultrasound, Biliary Sphincterotomy, Biliary and Pancreatic Stent Placement;  Surgeon: Yovanny Besaley MD;  [facility]     ENDOSCOPIC ULTRASOUND UPPER GASTROINTESTINAL TRACT (GI) N/A 02/03/2016    Procedure: ENDOSCOPIC ULTRASOUND, ESOPHAGOSCOPY / UPPER GASTROINTESTINAL TRACT (GI);  Surgeon: Grabiel Plata MD;  [facility]     ENDOSCOPIC ULTRASOUND UPPER GASTROINTESTINAL TRACT (GI) N/A 03/29/2018    Procedure: ENDOSCOPIC ULTRASOUND, ESOPHAGOSCOPY / UPPER GASTROINTESTINAL TRACT  (GI);;  Surgeon: Grabiel Plata MD;  [facility]     ESOPHAGOSCOPY, GASTROSCOPY, DUODENOSCOPY (EGD), COMBINED N/A 02/03/2016    Procedure: COMBINED ENDOSCOPIC ULTRASOUND, ESOPHAGOSCOPY, GASTROSCOPY, DUODENOSCOPY (EGD), FINE NEEDLE ASPIRATE/BIOPSY;  Surgeon: Grabiel Plata MD;  [facility]     ESOPHAGOSCOPY, GASTROSCOPY, DUODENOSCOPY (EGD), COMBINED N/A 06/08/2016    Procedure: COMBINED ESOPHAGOSCOPY, GASTROSCOPY, DUODENOSCOPY (EGD), REMOVE FOREIGN BODY;  Surgeon: Yovanny Beasley MD;  [facility]     ESOPHAGOSCOPY, GASTROSCOPY, DUODENOSCOPY (EGD), COMBINED N/A 04/17/2018    Procedure: COMBINED ESOPHAGOSCOPY, GASTROSCOPY, DUODENOSCOPY (EGD), REMOVE FOREIGN BODY;  EGD with stent removal;  Surgeon: Grabiel Plata MD;  Location:  GI     ESOPHAGOSCOPY, GASTROSCOPY, DUODENOSCOPY (EGD), COMBINED N/A 10/26/2022    Procedure: ESOPHAGOGASTRODUODENOSCOPY, WITH BIOPSY;  Surgeon: Jonatan Celeste MD;  Location: [facility]     EXCISE LESION INTRAORAL  06/14/2012    Procedure: EXCISE LESION INTRAORAL;  Wide Local Excision Floor of Mouth, Direct Laryngoscopy, Bilateral Ida's Marsuplization, Split Thickness Skin Graft from right Thigh  Latex Safe;  Surgeon: Gerson Ravi MD;  [facility]     EXCISE LESION INTRAORAL  08/02/2012    Procedure: EXCISE LESION INTRAORAL;  Floor of Mouth Resection, Bilateral Selective Radical Neck Dissection, Tracheostomy, Left Radial Forearm  Free Flap with Alloderm, Nasogastric Feeding Tube Placement,    * Latex Safe*;  Surgeon: Gerson Ravi MD;  [facility]     EXCISE LESION INTRAORAL  12/11/2012    Procedure: EXCISE LESION INTRAORAL;  takedown of oral flap;  Surgeon: Yung Alvares MD;  [facility]     GRAFT FREE VASCULARIZED (LOCATION)  08/02/2012    Procedure: GRAFT FREE VASCULARIZED (LOCATION);;  Surgeon: Yung Alvares MD;  [facility]     GRAFT SKIN SPLIT THICKNESS FROM EXTREMITY  06/14/2012    Procedure: GRAFT SKIN SPLIT  THICKNESS FROM EXTREMITY;;  Surgeon: Gerson Ravi MD;  [facility]     IR LOWER EXTREMITY ANGIOGRAM LEFT  2022     LAPAROSCOPIC ILEOSTOMY TAKEDOWN  2013    LAPAROSCOPIC ILEOSTOMY TAKEDOWN     LAPAROTOMY EXPLORATORY  2012    LAPAROTOMY EXPLORATORY with Colectomy     LARYNGOSCOPY  2012    Procedure: LARYNGOSCOPY;;  Surgeon: Gerson Ravi MD;  [facility]     ORTHOPEDIC SURGERY      ganglian cyst left ankle     PANCREATECTOMY, SPLENECTOMY N/A 03/10/2016    Procedure: PANCREATECTOMY, SPLENECTOMY;  Surgeon: Nael Abel MD;  [facility]     SHOULDER SURGERY  ,     2006- right rotator cuff,  bone spur on left. Dr. Hdez     VARICOCELECTOMY Left 2017    Procedure: VARICOCELECTOMY;  Left Varicocele Repair, Denervation of Left Testis;  Surgeon: Marcio Aggarwal MD;  [facility]        Family History   Problem Relation Age of Onset     Diabetes Sister         onset age 50     Alzheimer Disease Mother          80     Alzheimer Disease Father          85     Diabetes Other         nephew type 1     Diabetes Other      Aneurysm Sister      Anesthesia Reaction No family hx of      Colon Cancer No family hx of      Colon Polyps No family hx of      Crohn's Disease No family hx of      Ulcerative Colitis No family hx of        Social History     Socioeconomic History     Marital status:      Spouse name: Lily     Number of children: Not on file     Years of education: Not on file     Highest education level: Not on file   Occupational History     Occupation: [employer]     Comment: 20 hr/week monday-Thur 7-noon     Occupation: [place]      Employer: [employer]     Occupation: RETIRED   Tobacco Use     Smoking status: Former     Packs/day: 1.00     Years: 40.00     Additional pack years: 0.00     Total pack years: 40.00     Types: Cigarettes     Quit date: 2006     Years since quittin.0     Smokeless tobacco:  Never   Vaping Use     Vaping Use: Never used   Substance and Sexual Activity     Alcohol use: Yes     Drug use: Never     Sexual activity: Yes     Partners: Female   Other Topics Concern      Service Yes     Comment: [employer] 6 years     Blood Transfusions No     Caffeine Concern Yes     Comment: 0-2 cups     Occupational Exposure No     Comment: retired     Hobby Hazards No     Sleep Concern No     Stress Concern No     Weight Concern No     Special Diet Yes     Comment: healthy     Back Care No     Exercise Yes     Bike Helmet Not Asked     Comment: N/A     Seat Belt Yes     Self-Exams Yes     Parent/sibling w/ CABG, MI or angioplasty before 65F 55M? No   Social History Narrative    Son lives in [place] with 2 grandchildren 19 and 6     Social Determinants of Health     Financial Resource Strain: Low Risk  (11/13/2023)    Financial Resource Strain      Within the past 12 months, have you or your family members you live with been unable to get utilities (heat, electricity) when it was really needed?: No   Food Insecurity: Low Risk  (11/13/2023)    Food Insecurity      Within the past 12 months, did you worry that your food would run out before you got money to buy more?: No      Within the past 12 months, did the food you bought just not last and you didn t have money to get more?: No   Transportation Needs: Low Risk  (11/13/2023)    Transportation Needs      Within the past 12 months, has lack of transportation kept you from medical appointments, getting your medicines, non-medical meetings or appointments, work, or from getting things that you need?: No   Physical Activity: Not on file   Stress: Not on file   Social Connections: Not on file   Interpersonal Safety: Low Risk  (11/14/2023)    Interpersonal Safety      Do you feel physically and emotionally safe where you currently live?: Yes      Within the past 12 months, have you been hit, slapped, kicked or otherwise physically hurt by someone?: No       Within the past 12 months, have you been humiliated or emotionally abused in other ways by your partner or ex-partner?: No   Housing Stability: Low Risk  (11/13/2023)    Housing Stability      Do you have housing? : Yes      Are you worried about losing your housing?: No       Outpatient Encounter Medications as of 11/29/2023   Medication Sig Dispense Refill     ASPIRIN NOT PRESCRIBED (INTENTIONAL) Please choose reason not prescribed from choices below. (Patient not taking: Reported on 8/15/2023)       atorvastatin (LIPITOR) 80 MG tablet Take 1 tablet (80 mg) by mouth daily 90 tablet 3     clopidogrel (PLAVIX) 75 MG tablet Take 1 tablet (75 mg) by mouth daily Start taking medication the day after the procedure. 90 tablet 1     Continuous Blood Gluc  (FREESTYLE LISA 2 READER) JOSE ALBERTO Use to read blood sugars as per 's instructions. 1 each 0     Continuous Blood Gluc Sensor (FREESTYLE LISA 14 DAY SENSOR) MISC CHANGE EVERY 14 DAYS 6 each 1     Continuous Blood Gluc Sensor (FREESTYLE LISA 2 SENSOR) MISC Change every 14 days. 2 each 5     insulin glargine (LANTUS PEN) 100 UNIT/ML pen Inject 46 Units Subcutaneous At Bedtime Increase by 2 units every 3 days until blood sugars in am under 120 up to max 60 units daily 60 mL 4     insulin pen needle (BD LUIS U/F) 32G X 4 MM miscellaneous USE PEN NEEDLE ONCE DAILY AS DIRECTED 60 each 0     lisinopril (ZESTRIL) 20 MG tablet Take 1 tablet (20 mg) by mouth daily 90 tablet 1     metoprolol succinate ER (TOPROL XL) 50 MG 24 hr tablet TAKE 1 & 1/2 (ONE & ONE-HALF) TABLETS BY MOUTH TWICE DAILY 270 tablet 1     multivitamin, therapeutic with minerals (THERA-VIT-M) TABS Take 1 tablet by mouth daily. 30 each 1     pregabalin (LYRICA) 150 MG capsule Take 1 capsule by mouth twice daily 60 capsule 5     tamsulosin (FLOMAX) 0.4 MG capsule Take 1 capsule by mouth once daily 90 capsule 2     vitamin B-12 (CYANOCOBALAMIN) 100 MCG tablet Take 100 mcg by mouth daily        Vitamin D3 (CHOLECALCIFEROL) 25 mcg (1000 units) tablet Take 1 tablet by mouth daily       warfarin ANTICOAGULANT (COUMADIN) 2.5 MG tablet Take 1.25 mg every Mon, Wed, Fri; 2.5 mg all other days or As directed by Anticoagulation clinic 80 tablet 1     No facility-administered encounter medications on file as of 11/29/2023.             O:   NAD, WDWN, Alert & Oriented, Mood & Affect wnl, Vitals stable   Here today alone   There were no vitals taken for this visit.   General appearance normal   Vitals stable   Alert, oriented and in no acute distress     L cheek verrucous plaque    Stuck on papules and brown macules on trunk and ext   Red papules on trunk  Flesh colored papules on trunk         Eyes: Conjunctivae/lids:Normal     ENT: Lips, buccal mucosa, tongue: normal    MSK:Normal    Cardiovascular: peripheral edema none    Pulm: Breathing Normal    Neuro/Psych: Orientation:Alert and Orientedx3 ; Mood/Affect:normal       A/P:  1. Seborrheic keratosis, lentigo, angioma, dermal nevus  2. Verrucual keratosis   Pathophysiology discussed with pateint   Cryo, shave and excision discussed with patient   He would like excised  Schedule   It was a pleasure speaking to Antoine Russo today.  Previous clinic notes and pertinent laboratory tests were reviewed prior to Antoine Russo's visit.  Nature and genetics of benign skin lesions dicussed with patient.  Patient encouraged to perform monthly skin exams.  UV precautions reviewed with patient.  Return to clinic next appt      Again, thank you for allowing me to participate in the care of your patient.        Sincerely,        Zac Torres MD

## 2023-11-29 NOTE — PROGRESS NOTES
Antoine Russo is an extremely pleasant 77 year old year old male patient here today for spot on cheek.   .   Patient states this has been present for a while.  Patient reports the following symptoms:  growing.  Patient reports the following previous treatments cryo and shave.  .  These treatments did not work.  Patient reports the following modifying factors none.  Associated symptoms: none.  Patient has no other skin complaints today.  Remainder of the HPI, Meds, PMH, Allergies, FH, and SH was reviewed in chart.      Past Medical History:   Diagnosis Date    Acute gastritis 10/3/2020    Acute kidney injury (H24) 04/17/2019    Acute on chronic pancreatitis (H) 01/23/2018    Acute pancreatitis 10/21/2018    Acute recurrent pancreatitis 10/17/2020    Acute right-sided low back pain with left-sided sciatica 2/4/2020    Bacteriuria with pyuria 04/17/2019    C. difficile colitis     Chronic atrial fibrillation (H)     On chronic anticoagulation with coumadin    Colon polyp 08/26/2011    Colonoscopy 8/2011-A sessile polyp was found in the cecum. The polyp was 6 mm in size. The polyp was removed with a hot snare. Resection and retrieval were complete. A sessile polyp was found in the proximal transverse colon. The polyp was 15 mm in size. The polyp was removed with a hot snare. Resection and retrieval were complete. A sessile polyp was found in the sigmoid colon. The polyp was 5 mm    Dehydration 2/16/2021    Diabetes mellitus (H)     type 2    Groin fluid collection 12/15/2012    CT 12/12- New (since 5/11) collection measuring at least 2.3 cm in diameter and 6.5 cm in length within the right inguinal canal. 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, N0, M0 carcinoma, underwent transcervical glossectomy, floor of mouth excision, BL neck dissection, adj radiation, and skin flap reconstruction    Pancreatic duct leak 5/3/2016     Recurrent pancreatitis     alcoholic pancreatitis       Past Surgical History:   Procedure Laterality Date    BREAST SURGERY  01/01/2008    right breast mass benign    COLECTOMY SUBTOTAL  2013    With diverting ostomy creation; done for toxic C difficile colitis    COLONOSCOPY      multiple polyps removed    COLONOSCOPY  08/24/2011    TUMOR/POLYP/LESION BY SNARE    COLONOSCOPY  12/17/2012    COLONOSCOPY    COLONOSCOPY  12/18/2012    COLONOSCOPY    DENERVATION OF SPERMATIC CORD MICROSURGICAL Left 05/23/2017    Procedure: DENERVATION OF SPERMATIC CORD MICROSURGICAL;;  Surgeon: Marcio Aggarwal MD;  [facility]    DISSECTION RADICAL NECK BILATERAL  08/02/2012    Procedure: DISSECTION RADICAL NECK BILATERAL;;  Surgeon: Yung Alvares MD;  [facility]    ENDOSCOPIC RETROGRADE CHOLANGIOPANCREATOGRAM N/A 05/10/2016    Procedure: COMBINED ENDOSCOPIC RETROGRADE CHOLANGIOPANCREATOGRAPHY, PLACE TUBE/STENT;  Surgeon: Yovanny Beasley MD;  [facility]    ENDOSCOPIC RETROGRADE CHOLANGIOPANCREATOGRAM N/A 03/29/2018    Procedure: ENDOSCOPIC RETROGRADE CHOLANGIOPANCREATOGRAM;  Endoscopic Retrograde Cholangiopancreatogram, Endoscopic Ultrasound, Biliary Sphincterotomy, Biliary and Pancreatic Stent Placement;  Surgeon: Yovanny Beasley MD;  [facility]    ENDOSCOPIC ULTRASOUND UPPER GASTROINTESTINAL TRACT (GI) N/A 02/03/2016    Procedure: ENDOSCOPIC ULTRASOUND, ESOPHAGOSCOPY / UPPER GASTROINTESTINAL TRACT (GI);  Surgeon: Grabiel Plata MD;  [facility]    ENDOSCOPIC ULTRASOUND UPPER GASTROINTESTINAL TRACT (GI) N/A 03/29/2018    Procedure: ENDOSCOPIC ULTRASOUND, ESOPHAGOSCOPY / UPPER GASTROINTESTINAL TRACT (GI);;  Surgeon: Grabiel Plata MD;  [facility]    ESOPHAGOSCOPY, GASTROSCOPY, DUODENOSCOPY (EGD), COMBINED N/A 02/03/2016    Procedure: COMBINED ENDOSCOPIC ULTRASOUND, ESOPHAGOSCOPY, GASTROSCOPY, DUODENOSCOPY (EGD), FINE NEEDLE ASPIRATE/BIOPSY;  Surgeon: Grabiel Plata  MD Nader;  Location:  GI    ESOPHAGOSCOPY, GASTROSCOPY, DUODENOSCOPY (EGD), COMBINED N/A 06/08/2016    Procedure: COMBINED ESOPHAGOSCOPY, GASTROSCOPY, DUODENOSCOPY (EGD), REMOVE FOREIGN BODY;  Surgeon: Yovanny Beasley MD;  Location:  GI    ESOPHAGOSCOPY, GASTROSCOPY, DUODENOSCOPY (EGD), COMBINED N/A 04/17/2018    Procedure: COMBINED ESOPHAGOSCOPY, GASTROSCOPY, DUODENOSCOPY (EGD), REMOVE FOREIGN BODY;  EGD with stent removal;  Surgeon: Grabiel Plata MD;  Location:  GI    ESOPHAGOSCOPY, GASTROSCOPY, DUODENOSCOPY (EGD), COMBINED N/A 10/26/2022    Procedure: ESOPHAGOGASTRODUODENOSCOPY, WITH BIOPSY;  Surgeon: Jonatan Celeste MD;  Location: [facility]    EXCISE LESION INTRAORAL  06/14/2012    Procedure: EXCISE LESION INTRAORAL;  Wide Local Excision Floor of Mouth, Direct Laryngoscopy, Bilateral Holmes's Marsuplization, Split Thickness Skin Graft from right Thigh  Latex Safe;  Surgeon: Gerson Ravi MD;  [facility]    EXCISE LESION INTRAORAL  08/02/2012    Procedure: EXCISE LESION INTRAORAL;  Floor of Mouth Resection, Bilateral Selective Radical Neck Dissection, Tracheostomy, Left Radial Forearm  Free Flap with Alloderm, Nasogastric Feeding Tube Placement,    * Latex Safe*;  Surgeon: Gerson Ravi MD;  [facility]    EXCISE LESION INTRAORAL  12/11/2012    Procedure: EXCISE LESION INTRAORAL;  takedown of oral flap;  Surgeon: Yung Alvares MD;  [facility]    GRAFT FREE VASCULARIZED (LOCATION)  08/02/2012    Procedure: GRAFT FREE VASCULARIZED (LOCATION);;  Surgeon: Yung Alvares MD;  [facility]    GRAFT SKIN SPLIT THICKNESS FROM EXTREMITY  06/14/2012    Procedure: GRAFT SKIN SPLIT THICKNESS FROM EXTREMITY;;  Surgeon: Gerson Ravi MD;  [facility]    IR LOWER EXTREMITY ANGIOGRAM LEFT  9/30/2022    LAPAROSCOPIC ILEOSTOMY TAKEDOWN  06/06/2013    LAPAROSCOPIC ILEOSTOMY TAKEDOWN    LAPAROTOMY EXPLORATORY  12/20/2012    LAPAROTOMY EXPLORATORY with Colectomy    LARYNGOSCOPY   2012    Procedure: LARYNGOSCOPY;;  Surgeon: Gerson Ravi MD;  [facility]    ORTHOPEDIC SURGERY      ganglian cyst left ankle    PANCREATECTOMY, SPLENECTOMY N/A 03/10/2016    Procedure: PANCREATECTOMY, SPLENECTOMY;  Surgeon: Nael Abel MD;  [facility]    SHOULDER SURGERY  ,     2006- right rotator cuff,  bone spur on left. Dr. Hdez    VARICOCELECTOMY Left 2017    Procedure: VARICOCELECTOMY;  Left Varicocele Repair, Denervation of Left Testis;  Surgeon: Marcio Aggarwal MD;  [facility]        Family History   Problem Relation Age of Onset    Diabetes Sister         onset age 50    Alzheimer Disease Mother          80    Alzheimer Disease Father          85    Diabetes Other         nephew type 1    Diabetes Other     Aneurysm Sister     Anesthesia Reaction No family hx of     Colon Cancer No family hx of     Colon Polyps No family hx of     Crohn's Disease No family hx of     Ulcerative Colitis No family hx of        Social History     Socioeconomic History    Marital status:      Spouse name: Lily    Number of children: Not on file    Years of education: Not on file    Highest education level: Not on file   Occupational History    Occupation: [employer]     Comment: 20 hr/week monday-Thur 7-noon    Occupation: [place]      Employer: [employer]    Occupation: RETIRED   Tobacco Use    Smoking status: Former     Packs/day: 1.00     Years: 40.00     Additional pack years: 0.00     Total pack years: 40.00     Types: Cigarettes     Quit date: 2006     Years since quittin.0    Smokeless tobacco: Never   Vaping Use    Vaping Use: Never used   Substance and Sexual Activity    Alcohol use: Yes    Drug use: Never    Sexual activity: Yes     Partners: Female   Other Topics Concern     Service Yes     Comment: [employer] 6 years    Blood Transfusions No    Caffeine Concern Yes     Comment: 0-2 cups    Occupational  Exposure No     Comment: retired    Hobby Hazards No    Sleep Concern No    Stress Concern No    Weight Concern No    Special Diet Yes     Comment: healthy    Back Care No    Exercise Yes    Bike Helmet Not Asked     Comment: N/A    Seat Belt Yes    Self-Exams Yes    Parent/sibling w/ CABG, MI or angioplasty before 65F 55M? No   Social History Narrative    Son lives in [place] with 2 grandchildren 19 and 6     Social Determinants of Health     Financial Resource Strain: Low Risk  (11/13/2023)    Financial Resource Strain     Within the past 12 months, have you or your family members you live with been unable to get utilities (heat, electricity) when it was really needed?: No   Food Insecurity: Low Risk  (11/13/2023)    Food Insecurity     Within the past 12 months, did you worry that your food would run out before you got money to buy more?: No     Within the past 12 months, did the food you bought just not last and you didn t have money to get more?: No   Transportation Needs: Low Risk  (11/13/2023)    Transportation Needs     Within the past 12 months, has lack of transportation kept you from medical appointments, getting your medicines, non-medical meetings or appointments, work, or from getting things that you need?: No   Physical Activity: Not on file   Stress: Not on file   Social Connections: Not on file   Interpersonal Safety: Low Risk  (11/14/2023)    Interpersonal Safety     Do you feel physically and emotionally safe where you currently live?: Yes     Within the past 12 months, have you been hit, slapped, kicked or otherwise physically hurt by someone?: No     Within the past 12 months, have you been humiliated or emotionally abused in other ways by your partner or ex-partner?: No   Housing Stability: Low Risk  (11/13/2023)    Housing Stability     Do you have housing? : Yes     Are you worried about losing your housing?: No       Outpatient Encounter Medications as of 11/29/2023   Medication Sig  Dispense Refill    ASPIRIN NOT PRESCRIBED (INTENTIONAL) Please choose reason not prescribed from choices below. (Patient not taking: Reported on 8/15/2023)      atorvastatin (LIPITOR) 80 MG tablet Take 1 tablet (80 mg) by mouth daily 90 tablet 3    clopidogrel (PLAVIX) 75 MG tablet Take 1 tablet (75 mg) by mouth daily Start taking medication the day after the procedure. 90 tablet 1    Continuous Blood Gluc  (FREESTYLE LISA 2 READER) JOSE ALBERTO Use to read blood sugars as per 's instructions. 1 each 0    Continuous Blood Gluc Sensor (FREESTYLE LISA 14 DAY SENSOR) MISC CHANGE EVERY 14 DAYS 6 each 1    Continuous Blood Gluc Sensor (FREESTYLE LISA 2 SENSOR) MISC Change every 14 days. 2 each 5    insulin glargine (LANTUS PEN) 100 UNIT/ML pen Inject 46 Units Subcutaneous At Bedtime Increase by 2 units every 3 days until blood sugars in am under 120 up to max 60 units daily 60 mL 4    insulin pen needle (BD LUIS U/F) 32G X 4 MM miscellaneous USE PEN NEEDLE ONCE DAILY AS DIRECTED 60 each 0    lisinopril (ZESTRIL) 20 MG tablet Take 1 tablet (20 mg) by mouth daily 90 tablet 1    metoprolol succinate ER (TOPROL XL) 50 MG 24 hr tablet TAKE 1 & 1/2 (ONE & ONE-HALF) TABLETS BY MOUTH TWICE DAILY 270 tablet 1    multivitamin, therapeutic with minerals (THERA-VIT-M) TABS Take 1 tablet by mouth daily. 30 each 1    pregabalin (LYRICA) 150 MG capsule Take 1 capsule by mouth twice daily 60 capsule 5    tamsulosin (FLOMAX) 0.4 MG capsule Take 1 capsule by mouth once daily 90 capsule 2    vitamin B-12 (CYANOCOBALAMIN) 100 MCG tablet Take 100 mcg by mouth daily      Vitamin D3 (CHOLECALCIFEROL) 25 mcg (1000 units) tablet Take 1 tablet by mouth daily      warfarin ANTICOAGULANT (COUMADIN) 2.5 MG tablet Take 1.25 mg every Mon, Wed, Fri; 2.5 mg all other days or As directed by Anticoagulation clinic 80 tablet 1     No facility-administered encounter medications on file as of 11/29/2023.             O:   NAD, WDWN, Alert &  Oriented, Mood & Affect wnl, Vitals stable   Here today alone   There were no vitals taken for this visit.   General appearance normal   Vitals stable   Alert, oriented and in no acute distress     L cheek verrucous plaque    Stuck on papules and brown macules on trunk and ext   Red papules on trunk  Flesh colored papules on trunk         Eyes: Conjunctivae/lids:Normal     ENT: Lips, buccal mucosa, tongue: normal    MSK:Normal    Cardiovascular: peripheral edema none    Pulm: Breathing Normal    Neuro/Psych: Orientation:Alert and Orientedx3 ; Mood/Affect:normal       A/P:  1. Seborrheic keratosis, lentigo, angioma, dermal nevus  2. Verrucual keratosis   Pathophysiology discussed with pateint   Cryo, shave and excision discussed with patient   He would like excised  Schedule   It was a pleasure speaking to Antoine Russo today.  Previous clinic notes and pertinent laboratory tests were reviewed prior to Antoine Russo's visit.  Nature and genetics of benign skin lesions dicussed with patient.  Patient encouraged to perform monthly skin exams.  UV precautions reviewed with patient.  Return to clinic next appt

## 2023-12-18 ENCOUNTER — TELEPHONE (OUTPATIENT)
Dept: PHARMACY | Facility: CLINIC | Age: 78
End: 2023-12-18
Payer: COMMERCIAL

## 2023-12-18 NOTE — TELEPHONE ENCOUNTER
This patient is due for MTM follow-up. I called the patient to schedule an appointment. Appointment scheduled for 1/15/24.    Ivette Bell, PharmD, BCACP  Medication Therapy Management Pharmacist

## 2023-12-21 ENCOUNTER — LAB (OUTPATIENT)
Dept: LAB | Facility: CLINIC | Age: 78
End: 2023-12-21
Payer: COMMERCIAL

## 2023-12-21 ENCOUNTER — ANTICOAGULATION THERAPY VISIT (OUTPATIENT)
Dept: ANTICOAGULATION | Facility: CLINIC | Age: 78
End: 2023-12-21

## 2023-12-21 DIAGNOSIS — I48.20 CHRONIC ATRIAL FIBRILLATION (H): ICD-10-CM

## 2023-12-21 DIAGNOSIS — Z79.01 LONG TERM CURRENT USE OF ANTICOAGULANT THERAPY: Chronic | ICD-10-CM

## 2023-12-21 DIAGNOSIS — I48.91 ATRIAL FIBRILLATION (H): Primary | Chronic | ICD-10-CM

## 2023-12-21 LAB — INR BLD: 2.5 (ref 0.9–1.1)

## 2023-12-21 PROCEDURE — 85610 PROTHROMBIN TIME: CPT

## 2023-12-21 PROCEDURE — 36416 COLLJ CAPILLARY BLOOD SPEC: CPT

## 2023-12-21 NOTE — PROGRESS NOTES
ANTICOAGULATION MANAGEMENT     Antoine Russo 77 year old male is on warfarin with therapeutic INR result. (Goal INR 2.0-3.0)    Recent labs: (last 7 days)     12/21/23  0656   INR 2.5*       ASSESSMENT     Source(s): Chart Review and Patient/Caregiver Call     Warfarin doses taken: Warfarin taken as instructed  Diet: No new diet changes identified  Medication/supplement changes: None noted  New illness, injury, or hospitalization: No  Signs or symptoms of bleeding or clotting: No  Previous result: Therapeutic last 2(+) visits  Additional findings: None       PLAN     Recommended plan for no diet, medication or health factor changes affecting INR     Dosing Instructions: Continue your current warfarin dose with next INR in 6 weeks       Summary  As of 12/21/2023      Full warfarin instructions:  2.5 mg every Mon, Wed, Fri; 1.25 mg all other days   Next INR check:  2/1/2024               Telephone call with Poli or Saleem who verbalizes understanding and agrees to plan    Lab visit scheduled    Education provided:   Please call back if any changes to your diet, medications or how you've been taking warfarin    Plan made per [facility] anticoagulation protocol    Neela English RN  Anticoagulation Clinic  12/21/2023    _______________________________________________________________________     Anticoagulation Episode Summary       Current INR goal:  2.0-3.0   TTR:  80.7% (1 y)   Target end date:  Indefinite   Send INR reminders to:  [facility]    Indications    Atrial fibrillation (H) [I48.91]  Long term current use of anticoagulant therapy [Z79.01]  Chronic atrial fibrillation (H) [I48.20]             Comments:               Anticoagulation Care Providers       Provider Role Specialty Phone number    Katie Martino MD Referring Family Medicine [phone]    Brianna Matute APRN CNP Referring Family Medicine [phone]

## 2023-12-31 ENCOUNTER — MYC REFILL (OUTPATIENT)
Dept: FAMILY MEDICINE | Facility: CLINIC | Age: 78
End: 2023-12-31
Payer: COMMERCIAL

## 2023-12-31 DIAGNOSIS — R80.1 PERSISTENT PROTEINURIA: ICD-10-CM

## 2023-12-31 DIAGNOSIS — I10 HYPERTENSION, BENIGN ESSENTIAL, GOAL BELOW 140/90: ICD-10-CM

## 2023-12-31 DIAGNOSIS — N18.2 CHRONIC KIDNEY DISEASE, STAGE 2 (MILD): ICD-10-CM

## 2023-12-31 DIAGNOSIS — Z79.4 TYPE 2 DIABETES MELLITUS WITH DIABETIC NEUROPATHY, WITH LONG-TERM CURRENT USE OF INSULIN (H): ICD-10-CM

## 2023-12-31 DIAGNOSIS — E11.40 TYPE 2 DIABETES MELLITUS WITH DIABETIC NEUROPATHY, WITH LONG-TERM CURRENT USE OF INSULIN (H): ICD-10-CM

## 2024-01-03 RX ORDER — LISINOPRIL 20 MG/1
20 TABLET ORAL DAILY
Qty: 90 TABLET | Refills: 1 | Status: SHIPPED | OUTPATIENT
Start: 2024-01-03 | End: 2024-05-20

## 2024-01-10 NOTE — PROGRESS NOTES
Medication Therapy Management (MTM) Encounter    ASSESSMENT:                            Medication Adherence/Access: See below for considerations.    Hypertension/Afib/Hyperlipidemia/PAD/CAD: Patient is meeting blood pressure goal of < 130/80mmHg. Patient is on appropriate high-intensity statin therapy. Today discovered has been unintentionally off Plavix for about a year. We agree to verify with PCP/vascular surgeon if needs to restart antiplatelet therapy with Plavix or low-dse aspirin with concomitant warfarin for anticoagulation. Last INR at goal of 2-3.    Type 2 Diabetes: Patient is not meeting A1c goal of < 8%. Self monitoring of blood glucose is not at goal of fasting  mg/dL and post prandial < 180 mg/dL. Patient is not meeting goal of > 70% time in target with continuous glucose monitoring. Today discussed potential options of restarting Jardiance (expensive but offers kidney/heart protection benefits and lower hypoglycemia risk) versus other medications such as a sulfonylurea (cheap but higher hypoglycemia risk and no kidney/heart protection benefits). Patient opts to work on dietary modifications first and will think about it.    Neuropathy: Stable.    BPH: Stable.    Supplement: Stable.    PLAN:                            Checking with Dr. Yoder and Dr. Stacy regarding if need to restart Plavix (or alternatively aspirin 81mg) along with current warfarin and will let patient know their response via [employer]olio[employer].   Recommend reducing potato intake and will recheck Berta in 3 weeks to consider if medication changes are needed thereafter.    Follow-up: Return in 3 weeks (on 2/5/2024) for Medication Therapy Management.    SUBJECTIVE/OBJECTIVE:                          Poli Russo is a 78 year old male coming in for a follow-up visit from 11/30/22. This serves as an initial visit for 2024.    Reason for visit: Comprehensive medication review, no specific concerns today.    Allergies/ADRs:  "Reviewed in chart  Past Medical History: Reviewed in chart  Tobacco: He reports that he quit smoking about 17 years ago. His smoking use included cigarettes. He has a 40 pack-year smoking history. He has never used smokeless tobacco. Quit smoking Thanksgiving 2006.  Alcohol: \"a few beers daily\"    Medication Adherence/Access:   Patient takes medications directly from bottles.  Patient takes medications 2 times per day.   Per patient, misses medication 0 times per week.   Medication barriers: none.   The patient fills medications at [place]: NO, fills medications at [facility].    Hypertension/Afib/Hyperlipidemia/PAD/CAD/CKD:   Lisinopril 10mg daily.  Metoprolol ER 75mg twice daily.  Atorvastatin 80mg daily.  Clopidogrel 75mg daily - no longer taking? Called [facility] and last filled 90-day supply on 12/26/22.  Warfarin 2.5mg on Mon/Wed/Fri and 1.25mg on all other days as directed.   Patient does not self-monitor blood pressure.   Patient reports no current medication side effects. States left arm bruises easily, however no issues with right arm and no other bleeding concerns. Patient does have a history of GI bleed.  History of right lower extremity angiogram with SFA intervention in Sept 2022. Follows with vascular surgeon Dr. Stacy - reviewed last note from 7/25/23.    [id number] Score    Date Calculated: 10/20/2022  7:33 AM  [id number] Score: 4        BP Readings from Last 3 Encounters:   01/15/24 127/65   11/14/23 139/84   08/15/23 138/60     Pulse Readings from Last 3 Encounters:   01/15/24 68   11/14/23 63   08/15/23 65     INR   Date Value Ref Range Status   12/21/2023 2.5 (H) 0.9 - 1.1 Final     Recent Labs   Lab Test 08/15/23  0733 10/20/22  0736   CHOL 113 137   HDL 35* 36*   LDL 54 67   TRIG 120 170*     Estimated Creatinine Clearance: 60 mL/min (A) (based on SCr of 1.22 mg/dL (H)).    Last Comprehensive Metabolic Panel:  Lab Results   Component Value Date     08/15/2023    POTASSIUM 5.1 " 08/15/2023    CHLORIDE 101 08/15/2023    CO2 30 (H) 08/15/2023    ANIONGAP 7 08/15/2023     (H) 08/15/2023    BUN 15.1 08/15/2023    CR 1.22 (H) 08/15/2023    GFRESTIMATED 62 08/15/2023    NILSON 9.6 08/15/2023     Type 2 Diabetes:    Lantus 50 units every evening.   Stopped taking Jardiance about 6 months ago because too expensive. His income would be too high for assistance program.  Patient reports no current medication side effects. Noted history of recurrent pancreatitis so need to avoid GLP-1 agonists and DPP-4 inhibitors. Previously metformin ER caused diarrhea problems.  Blood sugar monitoring: Continuous Glucose Monitor - Freestyle Berta 2, see report below.   Current diabetes symptoms: none.  Diet/Exercise: Patient states diet varies, but does eat a lot of potatoes. Last diabetes education contact with Ruthann in August for Lantus increase after Jardiance discontinuation.  Eye exam: up to date  Foot exam: due        Lab Results   Component Value Date    UMALCR 1,203.94 (H) 11/14/2023     Lab Results   Component Value Date    A1C 9.1 11/14/2023    A1C 8.9 08/15/2023    A1C 8.7 05/11/2023    A1C 7.4 01/24/2023    A1C 8.2 10/20/2022    A1C 7.1 10/03/2020    A1C 6.3 05/12/2020    A1C 6.5 02/04/2020    A1C 9.0 10/17/2019    A1C 9.7 07/18/2019     Neuropathy:   Lyrica 150mg twice daily.   Patient feels current therapy is effective and reports no side effects.     BPH:   Tamsulosin 0.4mg daily.   Patient feels current therapy is effective and reports no side effects.    Supplement:   Multivitamin 1 tablet daily.  Vitamin B12 100mcg daily.  Vitamin D 1000 units daily.   No concerns today.    Lab Results   Component Value Date    B12 383 01/17/2019     Lab Results   Component Value Date    VITDT 28 04/20/2018     Today's Vitals: /65   Pulse 68  (declined weight)    ----------------    I spent 40 minutes with this patient today. All changes were made via collaborative practice agreement with Katie Yoder,  MD. A copy of the visit note was provided to the patient's provider(s).    A summary of these recommendations was sent via [employer].    Ivette Bell, PharmD, BCACP  Medication Therapy Management Pharmacist  [facility]     Medication Therapy Recommendations  Peripheral vascular disease (H24)    Current Medication: clopidogrel (PLAVIX) 75 MG tablet (Discontinued)   Rationale: Does not understand instructions - Adherence - Adherence   Recommendation: Provide Adherence Intervention - aspirin 81 MG Tbec   Status: Accepted with Changes per Provider

## 2024-01-15 ENCOUNTER — OFFICE VISIT (OUTPATIENT)
Dept: PHARMACY | Facility: CLINIC | Age: 79
End: 2024-01-15
Payer: COMMERCIAL

## 2024-01-15 VITALS — DIASTOLIC BLOOD PRESSURE: 65 MMHG | HEART RATE: 68 BPM | SYSTOLIC BLOOD PRESSURE: 127 MMHG

## 2024-01-15 DIAGNOSIS — N40.0 HYPERTROPHY OF PROSTATE WITHOUT URINARY OBSTRUCTION: ICD-10-CM

## 2024-01-15 DIAGNOSIS — E11.42 TYPE 2 DIABETES MELLITUS WITH DIABETIC POLYNEUROPATHY, WITHOUT LONG-TERM CURRENT USE OF INSULIN (H): ICD-10-CM

## 2024-01-15 DIAGNOSIS — N18.2 CHRONIC KIDNEY DISEASE, STAGE 2 (MILD): ICD-10-CM

## 2024-01-15 DIAGNOSIS — I48.20 CHRONIC ATRIAL FIBRILLATION (H): ICD-10-CM

## 2024-01-15 DIAGNOSIS — G62.9 PERIPHERAL POLYNEUROPATHY: ICD-10-CM

## 2024-01-15 DIAGNOSIS — E78.5 HYPERLIPIDEMIA LDL GOAL <100: ICD-10-CM

## 2024-01-15 DIAGNOSIS — Z78.9 TAKES DIETARY SUPPLEMENTS: ICD-10-CM

## 2024-01-15 DIAGNOSIS — I10 HYPERTENSION, BENIGN ESSENTIAL, GOAL BELOW 140/90: Primary | Chronic | ICD-10-CM

## 2024-01-15 DIAGNOSIS — I25.10 CORONARY ARTERY DISEASE INVOLVING NATIVE HEART, UNSPECIFIED VESSEL OR LESION TYPE, UNSPECIFIED WHETHER ANGINA PRESENT: ICD-10-CM

## 2024-01-15 DIAGNOSIS — I73.9 PERIPHERAL VASCULAR DISEASE (H): ICD-10-CM

## 2024-01-15 PROCEDURE — 99605 MTMS BY PHARM NP 15 MIN: CPT | Performed by: PHARMACIST

## 2024-01-15 PROCEDURE — 99607 MTMS BY PHARM ADDL 15 MIN: CPT | Performed by: PHARMACIST

## 2024-01-15 RX ORDER — ASPIRIN 81 MG/1
81 TABLET ORAL DAILY
COMMUNITY
End: 2024-05-20

## 2024-01-15 NOTE — PATIENT INSTRUCTIONS
"Recommendations from today's MTM visit:                                                    MTM (medication therapy management) is a service provided by a clinical pharmacist designed to help you get the most of out of your medicines.   Today we reviewed what your medicines are for, how to know if they are working, that your medicines are safe and how to make your medicine regimen as easy as possible.      Checking with your doctors regarding if need to restart Plavix along with current warfarin and will let you know their response via [employer].  Recommend reducing potato intake and will recheck Berta in 3 weeks to consider if medication changes are needed thereafter.    Follow-up: Return in 3 weeks (on 2/5/2024) for Medication Therapy Management.    It was great speaking with you today.  I value your experience and would be very thankful for your time in providing feedback in our clinic survey. In the next few days, you may receive an email or text message from [employer] with a link to a survey related to your  clinical pharmacist.\"     To schedule another MTM appointment, please call the clinic directly or you may call the MTM scheduling line at [phone] or toll-free at [phone].     My Clinical Pharmacist's contact information:                                                      Please feel free to contact me with any questions or concerns you have.      Ivette Bell, PharmD, [facility]  Medication Therapy Management Pharmacist  Voicemail: [phone] (Mon/Wed only)     "

## 2024-01-15 NOTE — LETTER
"Recommended To-Do List      Prepared on: 01/15/2024       You can get the best results from your medications by completing the items on this \"To-Do List.\"      Bring your To-Do List when you go to your doctor. And, share it with your family or caregivers.    My To-Do List:  What we talked about: What I should do:   Plavix question    Checking with your doctors regarding if need to restart Plavix along with current warfarin. Ivette will let you know their response via [employer] once she hears back.          What we talked about: What I should do:                     "

## 2024-01-15 NOTE — LETTER
January 15, 2024  Antoine LOPEZ Rafaela  [address] [place] [id number]    Dear DANIELLE Taylor [facility]     Thank you for talking with me on Jose 15, 2024 about your health and medications. As a follow-up to our conversation, I have included two documents:      Your Recommended To-Do List has steps you should take to get the best results from your medications.  Your Medication List will help you keep track of your medications and how to take them.    If you want to talk about these documents, please call Ivette Bell RPH at phone: [phone], Monday-Friday 8-4:30pm.    I look forward to working with you and your doctors to make sure your medications work well for you.    Sincerely,  Ivette Bell RPH  [facility] Pharmacist, [facility]

## 2024-01-15 NOTE — LETTER
_  Medication List        Prepared on: 01/15/2024     Bring your Medication List when you go to the doctor, hospital, or   emergency room. And, share it with your family or caregivers.     Note any changes to how you take your medications.  Cross out medications when you no longer use them.    Medication How I take it Why I use it Prescriber   atorvastatin (LIPITOR) 80 MG tablet Take 1 tablet (80 mg) by mouth daily Vascular disease, cholesterol Katie Martino MD   clopidogrel (PLAVIX) 75 MG tablet Take 1 tablet (75 mg) by mouth daily. Vascular disease Katie Martino MD   Continuous Blood Gluc  (FREESTYLE LISA 2 READER) JOSE ALBERTO Use to read blood sugars as per 's instructions. Diabetes Katie Martino MD   Continuous Blood Gluc Sensor (FREESTYLE LISA 2 SENSOR) MISC Change every 14 days. Diabetes Katie Martino MD   insulin glargine (LANTUS PEN) 100 UNIT/ML pen Inject 50 Units under the skin At Bedtime. Diabetes Katie Martino MD   insulin pen needle (BD LUIS U/F) 32G X 4 MM miscellaneous USE PEN NEEDLE ONCE DAILY AS DIRECTED Diabetes Katie Martino MD   lisinopril (ZESTRIL) 20 MG tablet Take 1 tablet (20 mg) by mouth daily Blood pressure, kidney disease Katie Martino MD   metoprolol succinate ER (TOPROL XL) 50 MG 24 hr tablet TAKE ONE & ONE-HALF TABLETS (75 MG) BY MOUTH TWICE DAILY Blood pressure, heart disease Katie Martino MD   multivitamin, therapeutic with minerals (THERA-VIT-M) TABS Take 1 tablet by mouth daily General health Katie Martino MD   pregabalin (LYRICA) 150 MG capsule Take 1 capsule (150 mg) by mouth twice daily Neuropathy Katie Martino MD   tamsulosin (FLOMAX) 0.4 MG capsule Take 1 capsule (0.4 mg) by mouth once daily Prostate, urination Katie Martino MD   vitamin B-12 (CYANOCOBALAMIN) 100 MCG tablet Take 1 tablet by mouth daily General health Katie Martino MD   Vitamin D3 (CHOLECALCIFEROL) 25 mcg (1000 units) tablet Take 1 tablet by mouth  daily General health Katie Martino MD   warfarin ANTICOAGULANT (COUMADIN) 2.5 MG tablet Take 1 tablet (2.5 mg) by mouth every Mon, Wed, Fri and take one-half tablet (1.25 mg) all other days or As directed by Anticoagulation clinic Atrial fibrillation, blood thinner. Katie Martino MD         Add new medications, over-the-counter drugs, herbals, vitamins, or  minerals in the blank rows below.    Medication How I take it Why I use it Prescriber                                      Allergies:      nkda [no known drug allergy]        Side effects I have had:               Other Information:              My notes and questions:

## 2024-01-16 ENCOUNTER — TELEPHONE (OUTPATIENT)
Dept: DERMATOLOGY | Facility: CLINIC | Age: 79
End: 2024-01-16
Payer: COMMERCIAL

## 2024-01-16 NOTE — TELEPHONE ENCOUNTER
Spoke to patient and advised to continue warfarin as his risks of stopping this medication are greater than us dealing with any bleeding. I did advise a pressure bandage will be applied after procedure and will stay in place 24 hours after procedure. Patient verbalized understanding. Sylvie Aguilar RN

## 2024-01-16 NOTE — TELEPHONE ENCOUNTER
[facility] Call Center    Phone Message    May a detailed message be left on voicemail: no     Reason for Call: Other: Patient called to let us know that he is on warfarin and if he needed to stop before his procedure. Please call patient to follow up.     Action Taken: Other: [facility]    Travel Screening: Not Applicable

## 2024-01-22 DIAGNOSIS — I48.20 CHRONIC ATRIAL FIBRILLATION (H): ICD-10-CM

## 2024-01-22 RX ORDER — WARFARIN SODIUM 2.5 MG/1
TABLET ORAL
Qty: 64 TABLET | Refills: 1 | Status: SHIPPED | OUTPATIENT
Start: 2024-01-22 | End: 2024-07-09

## 2024-01-22 NOTE — TELEPHONE ENCOUNTER
ANTICOAGULATION MANAGEMENT:  Medication Refill    Anticoagulation Summary  As of 12/21/2023      Warfarin maintenance plan:  2.5 mg (2.5 mg x 1) every Mon, Wed, Fri; 1.25 mg (2.5 mg x 0.5) all other days   Next INR check:  2/1/2024   Target end date:  Indefinite    Indications    Atrial fibrillation (H) [I48.91]  Long term current use of anticoagulant therapy [Z79.01]  Chronic atrial fibrillation (H) [I48.20]                 Anticoagulation Care Providers       Provider Role Specialty Phone number    Katie Martino MD Referring Family Medicine [phone]    Brianna Matute APRN CNP Referring Family Medicine [phone]            Refill Criteria    Visit with referring provider/group: Meets criteria: office visit within referring provider group in the last 1 year on 11-    [facility] referral signed last signed: 04/11/2023; within last year: Yes    Lab monitoring not exceeding 2 weeks overdue: Yes    Antoine meets all criteria for refill. Rx instructions and quantity supplied updated to match patient's current dosing plan. Warfarin 90 day supply with 1 refill granted per [facility] protocol     Aparna Kunz RN  Anticoagulation Clinic

## 2024-01-24 ENCOUNTER — OFFICE VISIT (OUTPATIENT)
Dept: DERMATOLOGY | Facility: CLINIC | Age: 79
End: 2024-01-24
Payer: COMMERCIAL

## 2024-01-24 DIAGNOSIS — D48.5 NEOPLASM OF UNCERTAIN BEHAVIOR OF SKIN: Primary | ICD-10-CM

## 2024-01-24 PROCEDURE — 88305 TISSUE EXAM BY PATHOLOGIST: CPT | Performed by: DERMATOLOGY

## 2024-01-24 PROCEDURE — 13132 CMPLX RPR F/C/C/M/N/AX/G/H/F: CPT | Performed by: DERMATOLOGY

## 2024-01-24 PROCEDURE — 11444 EXC FACE-MM B9+MARG 3.1-4 CM: CPT | Performed by: DERMATOLOGY

## 2024-01-24 NOTE — PROGRESS NOTES
Antoine Russo is an extremely pleasant 78 year old year old male patient here today for evaluation and managment of irritated seborrheic keratosis v nevus on left cheek.   Patient has no other skin complaints today.  Remainder of the HPI, Meds, PMH, Allergies, FH, and SH was reviewed in chart.      Past Medical History:   Diagnosis Date    Acute gastritis 10/3/2020    Acute kidney injury (H24) 04/17/2019    Acute on chronic pancreatitis (H) 01/23/2018    Acute pancreatitis 10/21/2018    Acute recurrent pancreatitis 10/17/2020    Acute right-sided low back pain with left-sided sciatica 2/4/2020    Bacteriuria with pyuria 04/17/2019    C. difficile colitis     Chronic atrial fibrillation (H)     On chronic anticoagulation with coumadin    Colon polyp 08/26/2011    Colonoscopy 8/2011-A sessile polyp was found in the cecum. The polyp was 6 mm in size. The polyp was removed with a hot snare. Resection and retrieval were complete. A sessile polyp was found in the proximal transverse colon. The polyp was 15 mm in size. The polyp was removed with a hot snare. Resection and retrieval were complete. A sessile polyp was found in the sigmoid colon. The polyp was 5 mm    Dehydration 2/16/2021    Diabetes mellitus (H)     type 2    Groin fluid collection 12/15/2012    CT 12/12- New (since 5/11) collection measuring at least 2.3 cm in diameter and 6.5 cm in length within the right inguinal canal. 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, N0, M0 carcinoma, underwent transcervical glossectomy, floor of mouth excision, BL neck dissection, adj radiation, and skin flap reconstruction    Pancreatic duct leak 5/3/2016    Recurrent pancreatitis     alcoholic pancreatitis       Past Surgical History:   Procedure Laterality Date    BREAST SURGERY  01/01/2008    right breast mass benign    COLECTOMY SUBTOTAL  2013    With diverting ostomy  creation; done for toxic C difficile colitis    COLONOSCOPY      multiple polyps removed    COLONOSCOPY  08/24/2011    TUMOR/POLYP/LESION BY SNARE    COLONOSCOPY  12/17/2012    COLONOSCOPY    COLONOSCOPY  12/18/2012    COLONOSCOPY    DENERVATION OF SPERMATIC CORD MICROSURGICAL Left 05/23/2017    Procedure: DENERVATION OF SPERMATIC CORD MICROSURGICAL;;  Surgeon: Marcio Aggarwal MD;  [facility]    DISSECTION RADICAL NECK BILATERAL  08/02/2012    Procedure: DISSECTION RADICAL NECK BILATERAL;;  Surgeon: Yung Alvares MD;  [facility]    ENDOSCOPIC RETROGRADE CHOLANGIOPANCREATOGRAM N/A 05/10/2016    Procedure: COMBINED ENDOSCOPIC RETROGRADE CHOLANGIOPANCREATOGRAPHY, PLACE TUBE/STENT;  Surgeon: Yovanny Beasley MD;  [facility]    ENDOSCOPIC RETROGRADE CHOLANGIOPANCREATOGRAM N/A 03/29/2018    Procedure: ENDOSCOPIC RETROGRADE CHOLANGIOPANCREATOGRAM;  Endoscopic Retrograde Cholangiopancreatogram, Endoscopic Ultrasound, Biliary Sphincterotomy, Biliary and Pancreatic Stent Placement;  Surgeon: Yovanny Beasley MD;  [facility]    ENDOSCOPIC ULTRASOUND UPPER GASTROINTESTINAL TRACT (GI) N/A 02/03/2016    Procedure: ENDOSCOPIC ULTRASOUND, ESOPHAGOSCOPY / UPPER GASTROINTESTINAL TRACT (GI);  Surgeon: Grabiel Plata MD;  [facility]    ENDOSCOPIC ULTRASOUND UPPER GASTROINTESTINAL TRACT (GI) N/A 03/29/2018    Procedure: ENDOSCOPIC ULTRASOUND, ESOPHAGOSCOPY / UPPER GASTROINTESTINAL TRACT (GI);;  Surgeon: Grabiel Plata MD;  [facility]    ESOPHAGOSCOPY, GASTROSCOPY, DUODENOSCOPY (EGD), COMBINED N/A 02/03/2016    Procedure: COMBINED ENDOSCOPIC ULTRASOUND, ESOPHAGOSCOPY, GASTROSCOPY, DUODENOSCOPY (EGD), FINE NEEDLE ASPIRATE/BIOPSY;  Surgeon: Grabiel Plata MD;  [facility]    ESOPHAGOSCOPY, GASTROSCOPY, DUODENOSCOPY (EGD), COMBINED N/A 06/08/2016    Procedure: COMBINED ESOPHAGOSCOPY, GASTROSCOPY, DUODENOSCOPY (EGD), REMOVE FOREIGN BODY;  Surgeon: Yovanny Beasley  MD Clement;  [facility]    ESOPHAGOSCOPY, GASTROSCOPY, DUODENOSCOPY (EGD), COMBINED N/A 04/17/2018    Procedure: COMBINED ESOPHAGOSCOPY, GASTROSCOPY, DUODENOSCOPY (EGD), REMOVE FOREIGN BODY;  EGD with stent removal;  Surgeon: Grabiel Plata MD;  [facility]    ESOPHAGOSCOPY, GASTROSCOPY, DUODENOSCOPY (EGD), COMBINED N/A 10/26/2022    Procedure: ESOPHAGOGASTRODUODENOSCOPY, WITH BIOPSY;  Surgeon: Jonatan Celeste MD;  Location[facility]    EXCISE LESION INTRAORAL  06/14/2012    Procedure: EXCISE LESION INTRAORAL;  Wide Local Excision Floor of Mouth, Direct Laryngoscopy, Bilateral Liberty's Marsuplization, Split Thickness Skin Graft from right Thigh  Latex Safe;  Surgeon: Gerson Ravi MD;  [facility]    EXCISE LESION INTRAORAL  08/02/2012    Procedure: EXCISE LESION INTRAORAL;  Floor of Mouth Resection, Bilateral Selective Radical Neck Dissection, Tracheostomy, Left Radial Forearm  Free Flap with Alloderm, Nasogastric Feeding Tube Placement,    * Latex Safe*;  Surgeon: Gerson Ravi MD;  [facility]    EXCISE LESION INTRAORAL  12/11/2012    Procedure: EXCISE LESION INTRAORAL;  takedown of oral flap;  Surgeon: Yung Alvares MD;  [facility]    GRAFT FREE VASCULARIZED (LOCATION)  08/02/2012    Procedure: GRAFT FREE VASCULARIZED (LOCATION);;  Surgeon: Yung Alvares MD;  [facility]    GRAFT SKIN SPLIT THICKNESS FROM EXTREMITY  06/14/2012    Procedure: GRAFT SKIN SPLIT THICKNESS FROM EXTREMITY;;  Surgeon: Gerson Ravi MD;  [facility]    IR LOWER EXTREMITY ANGIOGRAM LEFT  9/30/2022    LAPAROSCOPIC ILEOSTOMY TAKEDOWN  06/06/2013    LAPAROSCOPIC ILEOSTOMY TAKEDOWN    LAPAROTOMY EXPLORATORY  12/20/2012    LAPAROTOMY EXPLORATORY with Colectomy    LARYNGOSCOPY  06/14/2012    Procedure: LARYNGOSCOPY;;  Surgeon: Gerson Ravi MD;  [facility]    ORTHOPEDIC SURGERY      ganglian cyst left ankle    PANCREATECTOMY, SPLENECTOMY N/A 03/10/2016    Procedure: PANCREATECTOMY,  SPLENECTOMY;  Surgeon: Nael Abel MD;  [facility]    SHOULDER SURGERY  2006, 2008    2006- right rotator cuff, 2008 bone spur on left. Dr. Hdez    VARICOCELECTOMY Left 2017    Procedure: VARICOCELECTOMY;  Left Varicocele Repair, Denervation of Left Testis;  Surgeon: Marcio Aggarwal MD;  [facility]        Family History   Problem Relation Age of Onset    Diabetes Sister         onset age 50    Alzheimer Disease Mother          80    Alzheimer Disease Father          85    Diabetes Other         nephew type 1    Diabetes Other     Aneurysm Sister     Anesthesia Reaction No family hx of     Colon Cancer No family hx of     Colon Polyps No family hx of     Crohn's Disease No family hx of     Ulcerative Colitis No family hx of        Social History     Socioeconomic History    Marital status:      Spouse name: Lily    Number of children: Not on file    Years of education: Not on file    Highest education level: Not on file   Occupational History    Occupation: [employer]     Comment: 20 hr/week monday-Thur 7-noon    Occupation: [employer]      Employer: [employer]    Occupation: RETIRED   Tobacco Use    Smoking status: Former     Packs/day: 1.00     Years: 40.00     Additional pack years: 0.00     Total pack years: 40.00     Types: Cigarettes     Quit date: 2006     Years since quittin.1    Smokeless tobacco: Never   Vaping Use    Vaping Use: Never used   Substance and Sexual Activity    Alcohol use: Yes    Drug use: Never    Sexual activity: Yes     Partners: Female   Other Topics Concern     Service Yes     Comment: [employer] 6 years    Blood Transfusions No    Caffeine Concern Yes     Comment: 0-2 cups    Occupational Exposure No     Comment: retired    Hobby Hazards No    Sleep Concern No    Stress Concern No    Weight Concern No    Special Diet Yes     Comment: healthy    Back Care No    Exercise Yes    Bike Helmet Not  Asked     Comment: N/A    Seat Belt Yes    Self-Exams Yes    Parent/sibling w/ CABG, MI or angioplasty before 65F 55M? No   Social History Narrative    Son lives in [place] with 2 grandchildren 19 and 6     Social Determinants of Health     Financial Resource Strain: Low Risk  (11/13/2023)    Financial Resource Strain     Within the past 12 months, have you or your family members you live with been unable to get utilities (heat, electricity) when it was really needed?: No   Food Insecurity: Low Risk  (11/13/2023)    Food Insecurity     Within the past 12 months, did you worry that your food would run out before you got money to buy more?: No     Within the past 12 months, did the food you bought just not last and you didn t have money to get more?: No   Transportation Needs: Low Risk  (11/13/2023)    Transportation Needs     Within the past 12 months, has lack of transportation kept you from medical appointments, getting your medicines, non-medical meetings or appointments, work, or from getting things that you need?: No   Physical Activity: Not on file   Stress: Not on file   Social Connections: Not on file   Interpersonal Safety: Low Risk  (11/14/2023)    Interpersonal Safety     Do you feel physically and emotionally safe where you currently live?: Yes     Within the past 12 months, have you been hit, slapped, kicked or otherwise physically hurt by someone?: No     Within the past 12 months, have you been humiliated or emotionally abused in other ways by your partner or ex-partner?: No   Housing Stability: Low Risk  (11/13/2023)    Housing Stability     Do you have housing? : Yes     Are you worried about losing your housing?: No       Outpatient Encounter Medications as of 1/24/2024   Medication Sig Dispense Refill    aspirin 81 MG EC tablet Take 81 mg by mouth daily      atorvastatin (LIPITOR) 80 MG tablet Take 1 tablet (80 mg) by mouth daily 90 tablet 3    Continuous Blood Gluc  (FREESTYLE LISA 2  READER) JOSE ALBERTO Use to read blood sugars as per 's instructions. 1 each 0    Continuous Blood Gluc Sensor (FREESTYLE LISA 2 SENSOR) MISC Change every 14 days. 2 each 5    insulin glargine (LANTUS PEN) 100 UNIT/ML pen Inject 46 Units Subcutaneous At Bedtime Increase by 2 units every 3 days until blood sugars in am under 120 up to max 60 units daily (Patient taking differently: Inject 50 Units Subcutaneous at bedtime Increase by 2 units every 3 days until blood sugars in am under 120 up to max 60 units daily) 60 mL 4    insulin pen needle (BD LUIS U/F) 32G X 4 MM miscellaneous USE PEN NEEDLE ONCE DAILY AS DIRECTED 60 each 0    lisinopril (ZESTRIL) 20 MG tablet Take 1 tablet (20 mg) by mouth daily 90 tablet 1    metoprolol succinate ER (TOPROL XL) 50 MG 24 hr tablet TAKE 1 & 1/2 (ONE & ONE-HALF) TABLETS BY MOUTH TWICE DAILY 270 tablet 1    multivitamin, therapeutic with minerals (THERA-VIT-M) TABS Take 1 tablet by mouth daily. 30 each 1    pregabalin (LYRICA) 150 MG capsule Take 1 capsule by mouth twice daily 60 capsule 5    tamsulosin (FLOMAX) 0.4 MG capsule Take 1 capsule by mouth once daily 90 capsule 2    vitamin B-12 (CYANOCOBALAMIN) 100 MCG tablet Take 100 mcg by mouth daily      Vitamin D3 (CHOLECALCIFEROL) 25 mcg (1000 units) tablet Take 1 tablet by mouth daily      warfarin ANTICOAGULANT (COUMADIN) 2.5 MG tablet Take 2.5 mg every Mon, Wed, Fri; 1.25 mg all other days or As directed by Anticoagulation clinic 64 tablet 1     No facility-administered encounter medications on file as of 1/24/2024.             O:   NAD, WDWN, Alert & Oriented, Mood & Affect wnl, Vitals stable   General appearance normal   Vitals stable   Alert, oriented and in no acute distress     L medial cheek 3.3x2cm brown verrucous plaque      Eyes: Conjunctivae/lids:Normal     ENT: Lips, buccal mucosa, tongue: normal    MSK:Normal    Cardiovascular: peripheral edema none    Pulm: Breathing Normal    Neuro/Psych: Orientation:Alert  and Orientedx3 ; Mood/Affect:normal       A/P:  Excision benign lesion  Shave v excision discussed with patient   Scar discussed with patient   He wants excised  EXCISION OF BENIGN LESION AND COMPLEX: After thorough discussion of PGACAC, consent obtained, anesthesia and prep, the margins of the lesion were identified and an incision was made encompassing the lesion. The incisions were made through the skin and down to and including the subcutaneous tissue. The lesion was removed en bloc and submitted for perm  pathologic review. Because of proximity to lid and full thickness nature of defect, The wound edges were widely undermined greater than width of defect until adequate tissue mobility was obtained. hemostasis was achieved. The wound edges were then closed in a layered fashion with 4 Vicryl and 5.0 Fast gut , being careful not to leave any dead space. Postoperative length was 5.4 cm.   EBL minimal; complications none; wound care routine. The patient was discharged in good condition and will return in one week for wound evaluation.  It was a pleasure speaking to Antoine Russo today.

## 2024-01-24 NOTE — PATIENT INSTRUCTIONS
Sutured Wound Care     [facility]: [phone]    [facility]: [phone]          No strenuous activity for 48 hours. Resume moderate activity in 48 hours. No heavy exercising until you are seen for follow up in one week.     Take Tylenol as needed for discomfort.                         Do not drink alcoholic beverages for 48 hours.     Keep the pressure bandage in place for 24 hours. If the bandage becomes blood tinged or loose, reinforce it with gauze and tape.        (Refer to the reverse side of this page for management of bleeding).    Remove pressure bandage in 24 hours     Leave the flat bandage in place until your follow up appointment.    Keep the bandage dry. Wash around it carefully.    If the tape becomes soiled or starts to come off, reinforce it with additional paper tape.    Do not smoke for 3 weeks; smoking is detrimental to wound healing.    It is normal to have swelling and bruising around the surgical site. The bruising will fade in approximately 10-14 days. Elevate the area to reduce swelling.    Numbness, itchiness and sensitivity to temperature changes can occur after surgery and may take up to 18 months to normalize.      POSSIBLE COMPLICATIONS    BLEEDING:    Leave the bandage in place.  Use tightly rolled up gauze or a cloth to apply direct pressure over the bandage for 20   minutes.  Reapply pressure for an additional 20 minutes if necessary  Call the office or go to the nearest emergency room if pressure fails to stop the bleeding.  Use additional gauze and tape to maintain pressure once the bleeding has stopped.        PAIN:    Post operative pain should slowly get better, never worse.  A severe increase in pain may indicate a problem. Call the office if this occurs.    In case of emergency phone:Dr Torres [phone]

## 2024-01-24 NOTE — LETTER
1/24/2024         RE: Antoine Russo  [address] [address]        Dear Colleague,    Thank you for referring your patient, Antoine Russo, to the [facility]. Please see a copy of my visit note below.    Antoine Russo is an extremely pleasant 78 year old year old male patient here today for evaluation and managment of irritated seborrheic keratosis v nevus on left cheek.   Patient has no other skin complaints today.  Remainder of the HPI, Meds, PMH, Allergies, FH, and SH was reviewed in chart.      Past Medical History:   Diagnosis Date     Acute gastritis 10/3/2020     Acute kidney injury (H24) 04/17/2019     Acute on chronic pancreatitis (H) 01/23/2018     Acute pancreatitis 10/21/2018     Acute recurrent pancreatitis 10/17/2020     Acute right-sided low back pain with left-sided sciatica 2/4/2020     Bacteriuria with pyuria 04/17/2019     C. difficile colitis      Chronic atrial fibrillation (H)     On chronic anticoagulation with coumadin     Colon polyp 08/26/2011    Colonoscopy 8/2011-A sessile polyp was found in the cecum. The polyp was 6 mm in size. The polyp was removed with a hot snare. Resection and retrieval were complete. A sessile polyp was found in the proximal transverse colon. The polyp was 15 mm in size. The polyp was removed with a hot snare. Resection and retrieval were complete. A sessile polyp was found in the sigmoid colon. The polyp was 5 mm     Dehydration 2/16/2021     Diabetes mellitus (H)     type 2     Groin fluid collection 12/15/2012    CT 12/12- New (since 5/11) collection measuring at least 2.3 cm in diameter and 6.5 cm in length within the right inguinal canal. 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, N0, M0 carcinoma, underwent transcervical glossectomy, floor of mouth excision, BL neck dissection, adj radiation, and skin flap  reconstruction     Pancreatic duct leak 5/3/2016     Recurrent pancreatitis     alcoholic pancreatitis       Past Surgical History:   Procedure Laterality Date     BREAST SURGERY  01/01/2008    right breast mass benign     COLECTOMY SUBTOTAL  2013    With diverting ostomy creation; done for toxic C difficile colitis     COLONOSCOPY      multiple polyps removed     COLONOSCOPY  08/24/2011    TUMOR/POLYP/LESION BY SNARE     COLONOSCOPY  12/17/2012    COLONOSCOPY     COLONOSCOPY  12/18/2012    COLONOSCOPY     DENERVATION OF SPERMATIC CORD MICROSURGICAL Left 05/23/2017    Procedure: DENERVATION OF SPERMATIC CORD MICROSURGICAL;;  Surgeon: Marcio Aggarwal MD;  [facility]     DISSECTION RADICAL NECK BILATERAL  08/02/2012    Procedure: DISSECTION RADICAL NECK BILATERAL;;  Surgeon: Yung Alvares MD;  [facility]     ENDOSCOPIC RETROGRADE CHOLANGIOPANCREATOGRAM N/A 05/10/2016    Procedure: COMBINED ENDOSCOPIC RETROGRADE CHOLANGIOPANCREATOGRAPHY, PLACE TUBE/STENT;  Surgeon: Yovanny Beasley MD;  [facility]     ENDOSCOPIC RETROGRADE CHOLANGIOPANCREATOGRAM N/A 03/29/2018    Procedure: ENDOSCOPIC RETROGRADE CHOLANGIOPANCREATOGRAM;  Endoscopic Retrograde Cholangiopancreatogram, Endoscopic Ultrasound, Biliary Sphincterotomy, Biliary and Pancreatic Stent Placement;  Surgeon: Yovanny Beasley MD;  [facility]     ENDOSCOPIC ULTRASOUND UPPER GASTROINTESTINAL TRACT (GI) N/A 02/03/2016    Procedure: ENDOSCOPIC ULTRASOUND, ESOPHAGOSCOPY / UPPER GASTROINTESTINAL TRACT (GI);  Surgeon: Grabiel Plata MD;  [facility]     ENDOSCOPIC ULTRASOUND UPPER GASTROINTESTINAL TRACT (GI) N/A 03/29/2018    Procedure: ENDOSCOPIC ULTRASOUND, ESOPHAGOSCOPY / UPPER GASTROINTESTINAL TRACT (GI);;  Surgeon: Grabiel Plata MD;  [facility]     ESOPHAGOSCOPY, GASTROSCOPY, DUODENOSCOPY (EGD), COMBINED N/A 02/03/2016    Procedure: COMBINED ENDOSCOPIC ULTRASOUND, ESOPHAGOSCOPY, GASTROSCOPY, DUODENOSCOPY  (EGD), FINE NEEDLE ASPIRATE/BIOPSY;  Surgeon: Grabiel Plata MD;  Location:  GI     ESOPHAGOSCOPY, GASTROSCOPY, DUODENOSCOPY (EGD), COMBINED N/A 06/08/2016    Procedure: COMBINED ESOPHAGOSCOPY, GASTROSCOPY, DUODENOSCOPY (EGD), REMOVE FOREIGN BODY;  Surgeon: Yovanny Beasley MD;  Location:  GI     ESOPHAGOSCOPY, GASTROSCOPY, DUODENOSCOPY (EGD), COMBINED N/A 04/17/2018    Procedure: COMBINED ESOPHAGOSCOPY, GASTROSCOPY, DUODENOSCOPY (EGD), REMOVE FOREIGN BODY;  EGD with stent removal;  Surgeon: Grabiel Plata MD;  Location:  GI     ESOPHAGOSCOPY, GASTROSCOPY, DUODENOSCOPY (EGD), COMBINED N/A 10/26/2022    Procedure: ESOPHAGOGASTRODUODENOSCOPY, WITH BIOPSY;  Surgeon: Jonatan Celeste MD;  Location: [facility]     EXCISE LESION INTRAORAL  06/14/2012    Procedure: EXCISE LESION INTRAORAL;  Wide Local Excision Floor of Mouth, Direct Laryngoscopy, Bilateral [place]'s Marsuplization, Split Thickness Skin Graft from right Thigh  Latex Safe;  Surgeon: Gerson Ravi MD;  [facility]     EXCISE LESION INTRAORAL  08/02/2012    Procedure: EXCISE LESION INTRAORAL;  Floor of Mouth Resection, Bilateral Selective Radical Neck Dissection, Tracheostomy, Left Radial Forearm  Free Flap with Alloderm, Nasogastric Feeding Tube Placement,    * Latex Safe*;  Surgeon: Gerson Ravi MD;  [facility]     EXCISE LESION INTRAORAL  12/11/2012    Procedure: EXCISE LESION INTRAORAL;  takedown of oral flap;  Surgeon: Yung Alvares MD;  [facility]     GRAFT FREE VASCULARIZED (LOCATION)  08/02/2012    Procedure: GRAFT FREE VASCULARIZED (LOCATION);;  Surgeon: Yung Alvares MD;  [facility]     GRAFT SKIN SPLIT THICKNESS FROM EXTREMITY  06/14/2012    Procedure: GRAFT SKIN SPLIT THICKNESS FROM EXTREMITY;;  Surgeon: Gerson Ravi MD;  [facility]     IR LOWER EXTREMITY ANGIOGRAM LEFT  9/30/2022     LAPAROSCOPIC ILEOSTOMY TAKEDOWN  06/06/2013    LAPAROSCOPIC ILEOSTOMY TAKEDOWN     LAPAROTOMY  EXPLORATORY  2012    LAPAROTOMY EXPLORATORY with Colectomy     LARYNGOSCOPY  2012    Procedure: LARYNGOSCOPY;;  Surgeon: Gerson Ravi MD;  [facility]     ORTHOPEDIC SURGERY      ganglian cyst left ankle     PANCREATECTOMY, SPLENECTOMY N/A 03/10/2016    Procedure: PANCREATECTOMY, SPLENECTOMY;  Surgeon: Nael Abel MD;  [facility]     SHOULDER SURGERY  , 2008    2006- right rotator cuff, 2008 bone spur on left. Dr. Hdez     VARICOCELECTOMY Left 2017    Procedure: VARICOCELECTOMY;  Left Varicocele Repair, Denervation of Left Testis;  Surgeon: Marcio Aggarwal MD;  [facility]        Family History   Problem Relation Age of Onset     Diabetes Sister         onset age 50     Alzheimer Disease Mother          80     Alzheimer Disease Father          85     Diabetes Other         nephew type 1     Diabetes Other      Aneurysm Sister      Anesthesia Reaction No family hx of      Colon Cancer No family hx of      Colon Polyps No family hx of      Crohn's Disease No family hx of      Ulcerative Colitis No family hx of        Social History     Socioeconomic History     Marital status:      Spouse name: Lily     Number of children: Not on file     Years of education: Not on file     Highest education level: Not on file   Occupational History     Occupation: [employer]     Comment: 20 hr/week monday-Thur 7-noon     Occupation: [place]      Employer: [employer]     Occupation: RETIRED   Tobacco Use     Smoking status: Former     Packs/day: 1.00     Years: 40.00     Additional pack years: 0.00     Total pack years: 40.00     Types: Cigarettes     Quit date: 2006     Years since quittin.1     Smokeless tobacco: Never   Vaping Use     Vaping Use: Never used   Substance and Sexual Activity     Alcohol use: Yes     Drug use: Never     Sexual activity: Yes     Partners: Female   Other Topics Concern      Service Yes      Comment: [employer] 6 years     Blood Transfusions No     Caffeine Concern Yes     Comment: 0-2 cups     Occupational Exposure No     Comment: retired     Hobby Hazards No     Sleep Concern No     Stress Concern No     Weight Concern No     Special Diet Yes     Comment: healthy     Back Care No     Exercise Yes     Bike Helmet Not Asked     Comment: N/A     Seat Belt Yes     Self-Exams Yes     Parent/sibling w/ CABG, MI or angioplasty before 65F 55M? No   Social History Narrative    Son lives in [place] with 2 grandchildren 19 and 6     Social Determinants of Health     Financial Resource Strain: Low Risk  (11/13/2023)    Financial Resource Strain      Within the past 12 months, have you or your family members you live with been unable to get utilities (heat, electricity) when it was really needed?: No   Food Insecurity: Low Risk  (11/13/2023)    Food Insecurity      Within the past 12 months, did you worry that your food would run out before you got money to buy more?: No      Within the past 12 months, did the food you bought just not last and you didn t have money to get more?: No   Transportation Needs: Low Risk  (11/13/2023)    Transportation Needs      Within the past 12 months, has lack of transportation kept you from medical appointments, getting your medicines, non-medical meetings or appointments, work, or from getting things that you need?: No   Physical Activity: Not on file   Stress: Not on file   Social Connections: Not on file   Interpersonal Safety: Low Risk  (11/14/2023)    Interpersonal Safety      Do you feel physically and emotionally safe where you currently live?: Yes      Within the past 12 months, have you been hit, slapped, kicked or otherwise physically hurt by someone?: No      Within the past 12 months, have you been humiliated or emotionally abused in other ways by your partner or ex-partner?: No   Housing Stability: Low Risk  (11/13/2023)    Housing Stability      Do you have housing? :  Yes      Are you worried about losing your housing?: No       Outpatient Encounter Medications as of 1/24/2024   Medication Sig Dispense Refill     aspirin 81 MG EC tablet Take 81 mg by mouth daily       atorvastatin (LIPITOR) 80 MG tablet Take 1 tablet (80 mg) by mouth daily 90 tablet 3     Continuous Blood Gluc  (FREESTYLE LISA 2 READER) JOSE ALBERTO Use to read blood sugars as per 's instructions. 1 each 0     Continuous Blood Gluc Sensor (FREESTYLE LISA 2 SENSOR) MISC Change every 14 days. 2 each 5     insulin glargine (LANTUS PEN) 100 UNIT/ML pen Inject 46 Units Subcutaneous At Bedtime Increase by 2 units every 3 days until blood sugars in am under 120 up to max 60 units daily (Patient taking differently: Inject 50 Units Subcutaneous at bedtime Increase by 2 units every 3 days until blood sugars in am under 120 up to max 60 units daily) 60 mL 4     insulin pen needle (BD LUIS U/F) 32G X 4 MM miscellaneous USE PEN NEEDLE ONCE DAILY AS DIRECTED 60 each 0     lisinopril (ZESTRIL) 20 MG tablet Take 1 tablet (20 mg) by mouth daily 90 tablet 1     metoprolol succinate ER (TOPROL XL) 50 MG 24 hr tablet TAKE 1 & 1/2 (ONE & ONE-HALF) TABLETS BY MOUTH TWICE DAILY 270 tablet 1     multivitamin, therapeutic with minerals (THERA-VIT-M) TABS Take 1 tablet by mouth daily. 30 each 1     pregabalin (LYRICA) 150 MG capsule Take 1 capsule by mouth twice daily 60 capsule 5     tamsulosin (FLOMAX) 0.4 MG capsule Take 1 capsule by mouth once daily 90 capsule 2     vitamin B-12 (CYANOCOBALAMIN) 100 MCG tablet Take 100 mcg by mouth daily       Vitamin D3 (CHOLECALCIFEROL) 25 mcg (1000 units) tablet Take 1 tablet by mouth daily       warfarin ANTICOAGULANT (COUMADIN) 2.5 MG tablet Take 2.5 mg every Mon, Wed, Fri; 1.25 mg all other days or As directed by Anticoagulation clinic 64 tablet 1     No facility-administered encounter medications on file as of 1/24/2024.             O:   NAD, WDWN, Alert & Oriented, Mood & Affect  wnl, Vitals stable   General appearance normal   Vitals stable   Alert, oriented and in no acute distress     L medial cheek 3.3x2cm brown verrucous plaque      Eyes: Conjunctivae/lids:Normal     ENT: Lips, buccal mucosa, tongue: normal    MSK:Normal    Cardiovascular: peripheral edema none    Pulm: Breathing Normal    Neuro/Psych: Orientation:Alert and Orientedx3 ; Mood/Affect:normal       A/P:  Excision benign lesion  Shave v excision discussed with patient   Scar discussed with patient   He wants excised  EXCISION OF BENIGN LESION AND COMPLEX: After thorough discussion of PGACAC, consent obtained, anesthesia and prep, the margins of the lesion were identified and an incision was made encompassing the lesion. The incisions were made through the skin and down to and including the subcutaneous tissue. The lesion was removed en bloc and submitted for perm  pathologic review. Because of proximity to lid and full thickness nature of defect, The wound edges were widely undermined greater than width of defect until adequate tissue mobility was obtained. hemostasis was achieved. The wound edges were then closed in a layered fashion with 4 Vicryl and 5.0 Fast gut , being careful not to leave any dead space. Postoperative length was 5.4 cm.   EBL minimal; complications none; wound care routine. The patient was discharged in good condition and will return in one week for wound evaluation.  It was a pleasure speaking to Antoine Russo today.      Again, thank you for allowing me to participate in the care of your patient.        Sincerely,        Zac Torres MD

## 2024-01-31 ENCOUNTER — ALLIED HEALTH/NURSE VISIT (OUTPATIENT)
Dept: DERMATOLOGY | Facility: CLINIC | Age: 79
End: 2024-01-31
Payer: COMMERCIAL

## 2024-01-31 DIAGNOSIS — Z48.02 ATTENTION TO DRESSINGS AND SUTURES: ICD-10-CM

## 2024-01-31 DIAGNOSIS — Z85.828 HISTORY OF SKIN CANCER: Primary | ICD-10-CM

## 2024-01-31 DIAGNOSIS — Z48.00 ATTENTION TO DRESSINGS AND SUTURES: ICD-10-CM

## 2024-01-31 PROCEDURE — 87070 CULTURE OTHR SPECIMN AEROBIC: CPT

## 2024-01-31 PROCEDURE — 99207 PR NO CHARGE NURSE ONLY: CPT

## 2024-01-31 PROCEDURE — 87205 SMEAR GRAM STAIN: CPT

## 2024-01-31 NOTE — PATIENT INSTRUCTIONS
WOUND CARE INSTRUCTIONS  for  ONE WEEK AFTER SURGERY          For the open areas at the incision/graft site you should begin to cover the area with a bandage daily as follows:    Clean and dry the area with plain tap water using a Q-tip or sterile gauze pad.  Apply Vaseline, Aquaphor,Polysporin or Bacitracin ointment to the open area.  Cover the wound with a  non-stick gauze pad and micropore paper tape.   Repeat this wound care until the the wound has completely healed.       SIGNS OF INFECTION  - If you notice any of these signs of infection, call your doctor right away: expanding redness around the wound.  - Yellow or greenish-colored pus or cloudy wound drainage.    - Red streaking spreading from the wound.  - Increased swelling, tenderness, or pain around the wound.   - Fever.    Please remember that yellow and clear drainage from a wound can be normal and related to normal wound healing.  Isolated drainage from a wound without a combination of the above features does not indicate infection.       *Once the bandages are removed, the scar will be red and firm (especially in the lip/chin area). This is normal and will fade in time. It might take 6-12 months for this to happen.     *Massaging the area will help the scar soften and fade quicker. Begin to massage the area one month after the bandages have been removed. To massage apply pressure directly and firmly over the scar with the fingertips and move in a circular motion. Massage the area for a few minutes several times a day. Continue to massage the site for several months.    *Approximately 6-8 weeks after surgery it is not uncommon to see the formation of  tender pimple-like  bump along the scar. This is normal. As the scar continues to mature and the stitches underneath the skin begin to dissolve, this might occur. Do not pick or squeeze, this will resolve on it s own. Should one break open producing a small amount of drainage, apply Polysporin or  Bacitracin ointment a few times a day until the wound is completely healed.    *Numbness in the surgical area is expected. It might take 12-18 months for the feeling to return to normal. During this time sensations of itchiness, tingling and occasional sharp pains might be noted. These feelings are normal and will subside once the nerves have completely healed.         IN CASE OF EMERGENCY: Dr Torres [phone]     If you were seen in [place] call: [phone]    If you were seen in [place] call: [phone]

## 2024-01-31 NOTE — PROGRESS NOTES
Antoine JESSICA Russo comes into clinic today at the request of Dr. Torres Ordering Provider for Wound Check Action taken: See Below.    This service provided today was under the supervising provider of the day Dr. Torres, who was available if needed.    Pt returned to clinic for post surgery 1 week follow up bandage change. Pt has no complaints, denies pain. Bandage removed from left cheek, The area cleansed with normal saline. The area was red and warm to touch. Pt had some sensitivity to the area and some pain outside the sutured area. Orders for a culture were placed by the physician. A Colleague did a culture. Due to the wound having some drainage and open areas, Aquaphor and telfa was applied to the area.Gave pt open wound care instructions.  Advised to watch for signs/sx of infection; spreading redness, drainage, odor, fever. Call or report promptly to clinic. Pt given written instructions and informed to rtc as needed. Patient verbalized understanding.         Per Dr. Janae Verma (For this Wed, 4/24/23, can you call my 4:30 pm and see if he is able to come today at 11:45 am instead?  Or we can also offer Wed 4/24/23 at 11:45 am instead.)    Writer called patient no answer.  Will route to Dr. Janae verma pool

## 2024-02-01 ENCOUNTER — LAB (OUTPATIENT)
Dept: LAB | Facility: CLINIC | Age: 79
End: 2024-02-01
Payer: COMMERCIAL

## 2024-02-01 ENCOUNTER — ANTICOAGULATION THERAPY VISIT (OUTPATIENT)
Dept: ANTICOAGULATION | Facility: CLINIC | Age: 79
End: 2024-02-01

## 2024-02-01 DIAGNOSIS — I48.20 CHRONIC ATRIAL FIBRILLATION (H): Primary | Chronic | ICD-10-CM

## 2024-02-01 DIAGNOSIS — Z79.01 LONG TERM CURRENT USE OF ANTICOAGULANT THERAPY: Chronic | ICD-10-CM

## 2024-02-01 LAB — INR BLD: 1.8 (ref 0.9–1.1)

## 2024-02-01 PROCEDURE — 85610 PROTHROMBIN TIME: CPT

## 2024-02-01 PROCEDURE — 36416 COLLJ CAPILLARY BLOOD SPEC: CPT

## 2024-02-01 RX ORDER — DOXYCYCLINE 100 MG/1
100 CAPSULE ORAL 2 TIMES DAILY
Qty: 14 CAPSULE | Refills: 0 | Status: SHIPPED | OUTPATIENT
Start: 2024-02-01 | End: 2024-04-29

## 2024-02-01 NOTE — PROGRESS NOTES
ANTICOAGULATION MANAGEMENT     Antoine Russo 78 year old male is on warfarin with subtherapeutic INR result. (Goal INR 2.0-3.0)    Recent labs: (last 7 days)     02/01/24  0655   INR 1.8*       ASSESSMENT     Source(s): Chart Review and Patient/Caregiver Call     Warfarin doses taken: Warfarin taken as instructed  Diet: Increased greens/vitamin K in diet; plans to resume previous intake-pt had a lot of broccoli yesterday  Medication/supplement changes:  Doxycycline 7 day course (dates: 2/1/24-2/8/24) subsequent INRs may be increased. Closer INR monitoring recommended.  New illness, injury, or hospitalization: No- patient had his follow up from his MOHS procedure and noted some warmth/redness to the site. Doxycycline was prescribed today, however patient was not aware of this and has not spoken to the Provider. Advised that if he does take the antibiotic we will recheck INR again in one week, if he does not start the antibiotic, okay to recheck in 2 weeks at provider office visit  Signs or symptoms of bleeding or clotting: No  Previous result: Therapeutic last 2(+) visits  Additional findings: None       PLAN     Recommended plan for temporary change(s) affecting INR     Dosing Instructions: Continue your current warfarin dose with next INR in 1 week   (will not advise boost due to starting antibiotic)    Summary  As of 2/1/2024      Full warfarin instructions:  2.5 mg every Mon, Wed, Fri; 1.25 mg all other days   Next INR check:  2/9/2024               Telephone call with Poli or Saleem who verbalizes understanding and agrees to plan    Lab visit scheduled    Education provided:   Symptom monitoring: monitoring for bleeding signs and symptoms, monitoring for clotting signs and symptoms, and when to seek medical attention/emergency care  Importance of notifying anticoagulation clinic for: changes in medications; a sooner lab recheck maybe needed and diarrhea, nausea/vomiting, reduced intake, cold/flu, and/or  infections; a sooner lab recheck maybe needed    Plan made per [facility] anticoagulation protocol    Maria Victoria Castaneda, RN  Anticoagulation Clinic  2/1/2024    _______________________________________________________________________     Anticoagulation Episode Summary       Current INR goal:  2.0-3.0   TTR:  85.3% (1 y)   Target end date:  Indefinite   Send INR reminders to:  [facility]    Indications    Atrial fibrillation (H) [I48.91]  Long term current use of anticoagulant therapy [Z79.01]  Chronic atrial fibrillation (H) [I48.20]             Comments:               Anticoagulation Care Providers       Provider Role Specialty Phone number    Katie Martino MD Referring Family Medicine [phone]    Brianna Matute APRN CNP Referring Family Medicine [phone]

## 2024-02-02 ENCOUNTER — TELEPHONE (OUTPATIENT)
Dept: DERMATOLOGY | Facility: CLINIC | Age: 79
End: 2024-02-02
Payer: COMMERCIAL

## 2024-02-02 LAB
BACTERIA SKIN AEROBE CULT: ABNORMAL
GRAM STAIN RESULT: ABNORMAL
GRAM STAIN RESULT: ABNORMAL

## 2024-02-02 NOTE — TELEPHONE ENCOUNTER
"Patient feels great and feels like his face is healing.     I spoke with lab and the final culture was 2+ normal haley. They reported that the gram stain was abnormal due to the haley in the culture so they stated\" Sensitivity isn't needed\"    If provider is still requesting additional testing I will re-reach out the lab @ [phone].    Thank you,     Coreen Ramíerz MA    "

## 2024-02-02 NOTE — TELEPHONE ENCOUNTER
----- Message from Destiny Holguin PA-C sent at 2/2/2024 12:35 PM CST -----  In Dr Torres's absence, looks like this is the final report. Can the lab run a sensitivity? Let us know if pt is worsening - he should be much improved at this point.

## 2024-02-04 DIAGNOSIS — E11.40 TYPE 2 DIABETES MELLITUS WITH DIABETIC NEUROPATHY, WITH LONG-TERM CURRENT USE OF INSULIN (H): ICD-10-CM

## 2024-02-04 DIAGNOSIS — Z79.4 TYPE 2 DIABETES MELLITUS WITH DIABETIC NEUROPATHY, WITH LONG-TERM CURRENT USE OF INSULIN (H): ICD-10-CM

## 2024-02-05 ENCOUNTER — OFFICE VISIT (OUTPATIENT)
Dept: PHARMACY | Facility: CLINIC | Age: 79
End: 2024-02-05
Payer: COMMERCIAL

## 2024-02-05 DIAGNOSIS — Z79.4 TYPE 2 DIABETES MELLITUS WITH DIABETIC NEUROPATHY, WITH LONG-TERM CURRENT USE OF INSULIN (H): Primary | ICD-10-CM

## 2024-02-05 DIAGNOSIS — E11.40 TYPE 2 DIABETES MELLITUS WITH DIABETIC NEUROPATHY, WITH LONG-TERM CURRENT USE OF INSULIN (H): Primary | ICD-10-CM

## 2024-02-05 DIAGNOSIS — I25.10 CORONARY ARTERY DISEASE INVOLVING NATIVE HEART, UNSPECIFIED VESSEL OR LESION TYPE, UNSPECIFIED WHETHER ANGINA PRESENT: ICD-10-CM

## 2024-02-05 DIAGNOSIS — I48.20 CHRONIC ATRIAL FIBRILLATION (H): ICD-10-CM

## 2024-02-05 DIAGNOSIS — I10 HYPERTENSION, BENIGN ESSENTIAL, GOAL BELOW 140/90: ICD-10-CM

## 2024-02-05 DIAGNOSIS — I73.9 PERIPHERAL VASCULAR DISEASE (H): ICD-10-CM

## 2024-02-05 DIAGNOSIS — N18.2 CHRONIC KIDNEY DISEASE, STAGE 2 (MILD): ICD-10-CM

## 2024-02-05 DIAGNOSIS — E78.5 HYPERLIPIDEMIA LDL GOAL <100: ICD-10-CM

## 2024-02-05 PROCEDURE — 99606 MTMS BY PHARM EST 15 MIN: CPT | Performed by: PHARMACIST

## 2024-02-05 RX ORDER — PIOGLITAZONEHYDROCHLORIDE 15 MG/1
15 TABLET ORAL DAILY
Qty: 30 TABLET | Refills: 1 | Status: SHIPPED | OUTPATIENT
Start: 2024-02-05 | End: 2024-05-20

## 2024-02-05 RX ORDER — PREGABALIN 150 MG/1
CAPSULE ORAL
Qty: 60 CAPSULE | Refills: 2 | Status: SHIPPED | OUTPATIENT
Start: 2024-02-05 | End: 2024-05-06

## 2024-02-05 NOTE — Clinical Note
FYI - started Actos today. Seeing you next on 2/15. Thx!  Ivette Bell, PharmD, BCACP Medication Therapy Management Pharmacist

## 2024-02-05 NOTE — PATIENT INSTRUCTIONS
"Recommendations from today's MTM visit:                                                    MTM (medication therapy management) is a service provided by a clinical pharmacist designed to help you get the most of out of your medicines.   Today we reviewed what your medicines are for, how to know if they are working, that your medicines are safe and how to make your medicine regimen as easy as possible.      Reminder to start taking aspirin 81mg daily.  Start pioglitazone 15mg daily. Sent prescription to [facility].  Please schedule with Ruthann for diabetes education follow-up.    Follow-up: Return in 10 days (on 2/15/2024) for Primary Care Provider Visit.    It was great speaking with you today.  I value your experience and would be very thankful for your time in providing feedback in our clinic survey. In the next few days, you may receive an email or text message from [employer] with a link to a survey related to your  clinical pharmacist.\"     To schedule another MTM appointment, please call the clinic directly or you may call the MTM scheduling line at [phone] or toll-free at [phone].     My Clinical Pharmacist's contact information:                                                      Please feel free to contact me with any questions or concerns you have.      Ivette Bell, PharmD, BCACP  Medication Therapy Management Pharmacist  Voicemail: [phone] (Mon/Wed only)     "

## 2024-02-09 ENCOUNTER — LAB (OUTPATIENT)
Dept: LAB | Facility: CLINIC | Age: 79
End: 2024-02-09
Payer: COMMERCIAL

## 2024-02-09 ENCOUNTER — ANTICOAGULATION THERAPY VISIT (OUTPATIENT)
Dept: ANTICOAGULATION | Facility: CLINIC | Age: 79
End: 2024-02-09

## 2024-02-09 DIAGNOSIS — Z79.01 LONG TERM CURRENT USE OF ANTICOAGULANT THERAPY: Chronic | ICD-10-CM

## 2024-02-09 DIAGNOSIS — I48.20 CHRONIC ATRIAL FIBRILLATION (H): Primary | Chronic | ICD-10-CM

## 2024-02-09 LAB — INR BLD: 1.9 (ref 0.9–1.1)

## 2024-02-09 PROCEDURE — 85610 PROTHROMBIN TIME: CPT

## 2024-02-09 PROCEDURE — 36416 COLLJ CAPILLARY BLOOD SPEC: CPT

## 2024-02-09 NOTE — PROGRESS NOTES
ANTICOAGULATION MANAGEMENT     Antoine Russo 78 year old male is on warfarin with subtherapeutic INR result. (Goal INR 2.0-3.0)    Recent labs: (last 7 days)     02/09/24  0653   INR 1.9*       ASSESSMENT     Source(s): Chart Review and Patient/Caregiver Call     Warfarin doses taken: Warfarin taken as instructed  Diet: No new diet changes identified  Medication/supplement changes:  doxycycline finished in the last day or two, per pt  New illness, injury, or hospitalization: Yes: skin infection following MOHs procedure   Signs or symptoms of bleeding or clotting: No  Previous result: Subtherapeutic  Additional findings: None       PLAN     Recommended plan for no diet, medication or health factor changes affecting INR     Dosing Instructions: Increase your warfarin dose (10% change) with next INR in 2 weeks       Summary  As of 2/9/2024      Full warfarin instructions:  1.25 mg every Tue, Thu, Sat; 2.5 mg all other days   Next INR check:  2/23/2024               Telephone call with Poli or Saleem who verbalizes understanding and agrees to plan    Lab visit scheduled    Education provided:   Please call back if any changes to your diet, medications or how you've been taking warfarin  Goal range and lab monitoring: goal range and significance of current result, Importance of therapeutic range, and Importance of following up at instructed interval  Symptom monitoring: monitoring for clotting signs and symptoms and monitoring for stroke signs and symptoms    Plan made per [facility] anticoagulation protocol    Aurora Carrillo, RN  Anticoagulation Clinic  2/9/2024    _______________________________________________________________________     Anticoagulation Episode Summary       Current INR goal:  2.0-3.0   TTR:  85.2% (1 y)   Target end date:  Indefinite   Send INR reminders to:  [facility]    Indications    Atrial fibrillation (H) [I48.91]  Long term current use of anticoagulant therapy [Z79.01]  Chronic  atrial fibrillation (H) [I48.20]             Comments:               Anticoagulation Care Providers       Provider Role Specialty Phone number    Katie Martino MD Referring Family Medicine [phone]    Brianna Matute APRN [facility] Referring Family Medicine [phone]

## 2024-02-15 ENCOUNTER — OFFICE VISIT (OUTPATIENT)
Dept: FAMILY MEDICINE | Facility: CLINIC | Age: 79
End: 2024-02-15
Payer: COMMERCIAL

## 2024-02-15 VITALS
OXYGEN SATURATION: 92 % | WEIGHT: 218 LBS | HEIGHT: 69 IN | RESPIRATION RATE: 20 BRPM | BODY MASS INDEX: 32.29 KG/M2 | SYSTOLIC BLOOD PRESSURE: 139 MMHG | TEMPERATURE: 97.7 F | DIASTOLIC BLOOD PRESSURE: 88 MMHG | HEART RATE: 77 BPM

## 2024-02-15 DIAGNOSIS — I48.20 CHRONIC ATRIAL FIBRILLATION (H): ICD-10-CM

## 2024-02-15 DIAGNOSIS — Z79.01 LONG TERM CURRENT USE OF ANTICOAGULANT THERAPY: ICD-10-CM

## 2024-02-15 DIAGNOSIS — E78.5 HYPERLIPIDEMIA LDL GOAL <100: ICD-10-CM

## 2024-02-15 DIAGNOSIS — D49.0 IPMN (INTRADUCTAL PAPILLARY MUCINOUS NEOPLASM): Chronic | ICD-10-CM

## 2024-02-15 DIAGNOSIS — Z79.4 TYPE 2 DIABETES MELLITUS WITH DIABETIC NEUROPATHY, WITH LONG-TERM CURRENT USE OF INSULIN (H): Primary | ICD-10-CM

## 2024-02-15 DIAGNOSIS — G62.9 PERIPHERAL POLYNEUROPATHY: Chronic | ICD-10-CM

## 2024-02-15 DIAGNOSIS — E11.40 TYPE 2 DIABETES MELLITUS WITH DIABETIC NEUROPATHY, WITH LONG-TERM CURRENT USE OF INSULIN (H): Primary | ICD-10-CM

## 2024-02-15 DIAGNOSIS — A04.72 CLOSTRIDIUM DIFFICILE ENTEROCOLITIS: Chronic | ICD-10-CM

## 2024-02-15 DIAGNOSIS — C04.0 MALIGNANT NEOPLASM OF ANTERIOR PORTION OF FLOOR OF MOUTH (H): ICD-10-CM

## 2024-02-15 DIAGNOSIS — F10.20 ALCOHOL DEPENDENCE, UNCOMPLICATED (H): ICD-10-CM

## 2024-02-15 DIAGNOSIS — I10 HYPERTENSION, BENIGN ESSENTIAL, GOAL BELOW 140/90: ICD-10-CM

## 2024-02-15 DIAGNOSIS — Z90.49 H/O COLECTOMY: Chronic | ICD-10-CM

## 2024-02-15 LAB
ANION GAP SERPL CALCULATED.3IONS-SCNC: 9 MMOL/L (ref 7–15)
BUN SERPL-MCNC: 17 MG/DL (ref 8–23)
CALCIUM SERPL-MCNC: 10.2 MG/DL (ref 8.8–10.2)
CHLORIDE SERPL-SCNC: 101 MMOL/L (ref 98–107)
CREAT SERPL-MCNC: 1.09 MG/DL (ref 0.67–1.17)
DEPRECATED HCO3 PLAS-SCNC: 29 MMOL/L (ref 22–29)
EGFRCR SERPLBLD CKD-EPI 2021: 70 ML/MIN/1.73M2
GLUCOSE SERPL-MCNC: 186 MG/DL (ref 70–99)
HBA1C MFR BLD: 10.4 % (ref 0–5.6)
HOLD SPECIMEN: NORMAL
POTASSIUM SERPL-SCNC: 5 MMOL/L (ref 3.4–5.3)
SODIUM SERPL-SCNC: 139 MMOL/L (ref 135–145)

## 2024-02-15 PROCEDURE — 36415 COLL VENOUS BLD VENIPUNCTURE: CPT | Performed by: FAMILY MEDICINE

## 2024-02-15 PROCEDURE — 80048 BASIC METABOLIC PNL TOTAL CA: CPT | Performed by: FAMILY MEDICINE

## 2024-02-15 PROCEDURE — 83036 HEMOGLOBIN GLYCOSYLATED A1C: CPT | Performed by: FAMILY MEDICINE

## 2024-02-15 PROCEDURE — 99214 OFFICE O/P EST MOD 30 MIN: CPT | Performed by: FAMILY MEDICINE

## 2024-02-15 RX ORDER — RESPIRATORY SYNCYTIAL VIRUS VACCINE 120MCG/0.5
0.5 KIT INTRAMUSCULAR ONCE
Qty: 1 EACH | Refills: 0 | Status: CANCELLED | OUTPATIENT
Start: 2024-02-15 | End: 2024-02-15

## 2024-02-15 ASSESSMENT — PAIN SCALES - GENERAL: PAINLEVEL: NO PAIN (0)

## 2024-02-15 NOTE — PROGRESS NOTES
"  Assessment & Plan     Type 2 diabetes mellitus with diabetic neuropathy, with long-term current use of insulin (H)  Uncontrolled  Discussed with Ruthann, will need to go to meal time insulin, she will arrange  Other meds very pricey for him  Edema will limit increasing Actos  - Hemoglobin A1c; Future  - Hemoglobin A1c  - Basic metabolic panel  (Ca, Cl, CO2, Creat, Gluc, K, Na, BUN); Future  - insulin glargine (LANTUS PEN) 100 UNIT/ML pen; Inject 50 Units Subcutaneous at bedtime Increase by 2 units every 3 days until blood sugars in am under 120 up to max 60 units daily  - Basic metabolic panel  (Ca, Cl, CO2, Creat, Gluc, K, Na, BUN)    Chronic atrial fibrillation (H)  Stable, regular today  Continue warfarin and metoprolol    Hypertension, benign essential, goal below 140/90  Well controlled  Continue lisinopril and metoprolol    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    Weight as of this encounter: 98.9 kg (218 lb).   Weight management plan: Discussed healthy diet and exercise guidelines  Blood sugar testing frequency justification:  Adjustment of medication(s) and Risk of hypoglycemia with medication(s)    Recheck 3 months    Terri Ashton or Saleem is a 78 year old, presenting for the following health issues:  Diabetes      2/15/2024     6:54 AM   Additional Questions   Roomed by Margarita     History of Present Illness       Diabetes:   He presents for follow up of diabetes.  He is checking home blood glucose four or more times daily.   He " checks blood glucose before and after meals and at bedtime.  Blood glucose is sometimes over 200 and never under 70. He is aware of hypoglycemia symptoms including shakiness.    He has no concerns regarding his diabetes at this time.  He is having numbness in feet and burning in feet.            Reason for visit:  Follow-up    He eats 0-1 servings of fruits and vegetables daily.He consumes 0 sweetened beverage(s) daily.He exercises with enough effort to increase his heart rate 9 or less minutes per day.  He exercises with enough effort to increase his heart rate 3 or less days per week.   He is taking medications regularly.     diabetes   On lantus 50 units, Actos was recently started by [facility] pharmacist. He has a little swelling in his legs.  Lab Results   Component Value Date    A1C 10.4 02/15/2024    A1C 9.1 11/14/2023    A1C 8.9 08/15/2023    A1C 8.7 05/11/2023    A1C 7.4 01/24/2023    A1C 7.1 10/03/2020    A1C 6.3 05/12/2020    A1C 6.5 02/04/2020    A1C 9.0 10/17/2019    A1C 9.7 07/18/2019      Patient has Diabetes Type 2.    Patient is on Continuous Personal CGM and is treated with insulin  Pt requires frequent adjustments to their insulin treatment regimen on the basis of therapeutic CGM testing results.  Pt is checking their blood sugars at least 4 times daily via sensor.  Pt is seen every 3 months for evaluation of diabetes control.     hypertension   On lisinopril, metoprolol  BP Readings from Last 6 Encounters:   02/15/24 139/88   01/15/24 127/65   11/14/23 139/84   08/15/23 138/60   07/25/23 131/78   07/19/23 (!) 163/90      hyperlipidemia   On atorvastatin  Recent Labs   Lab Test 08/15/23  0733 10/20/22  0736   CHOL 113 137   HDL 35* 36*   LDL 54 67   TRIG 120 170*        Chronic afib  On warfarin and metoprolol    Neuropathy  Worsening some  On Lyrica  Followed by neurology    peripheral vascular disease   Had right lower extremity angiogram with SFA intervention 9/30/22 by Dr Stacy.   Legs get tired  "and sore after walking a while   Followed by vascular, due to see them in July    Oral cancer  2012 had transcervical glossectomy and floor of mouth excision with bilateral neck dissection  Tumor stage [id number]  Nodes all negative and margins free  Had forearm free flap reconstruction by Dr Alvares.   No longer on surveillance per patient     He had severe C diff colitis which required a total abdominal colectomy 12/21/12 and subsequent ileorectal anastomosis in 2013.     During staging and surveillance for the oral cancer, a pancreatic cystic lesion was identified. 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 Final surgical pathology showed an IPMN without evidence of invasive disease and with clear margins. Post op complications included a pancreatic leak and subdiaphragmatic abscess.   Had stent in 2018, with subsequent removal.    Has had multiple episodes of pancreatitis thought to be due to alcohol use, last one was 10/2022  Has not stopped drinking altogether, has a few beers here and there, no hard alcohol.     Review of Systems  ROS: 5 point ROS negative except as noted above in HPI, including Gen., Resp., CV, GI &  system review.       Objective    /88 (BP Location: Right arm)   Pulse 77   Temp 97.7  F (36.5  C) (Tympanic)   Resp 20   Ht 1.753 m (5' 9\")   Wt 98.9 kg (218 lb)   SpO2 92%   BMI 32.19 kg/m    Body mass index is 32.19 kg/m .  Physical Exam   GENERAL: alert and no distress  Sadia: post op changes and scarring, no lesions identified  NECK: no adenopathy, no asymmetry, masses  RESP: lungs clear to auscultation - no rales, rhonchi or wheezes  CV: regular rate and rhythm, normal S1 S2, no S3 or S4, no murmur, click or rub, trace peripheral edema  ABDOMEN: soft, obese, multiple scars, nontender, no hepatosplenomegaly, no masses and bowel sounds normal  MS: no gross musculoskeletal defects noted    Results for orders placed or performed in visit on 02/15/24 "   Hemoglobin A1c     Status: Abnormal   Result Value Ref Range    Hemoglobin A1C 10.4 (H) 0.0 - 5.6 %    Narrative    Results consistent with previous, repeat testing unnecessary    Extra Tube     Status: None (In process)    Narrative    The following orders were created for panel order Extra Tube.  Procedure                               Abnormality         Status                     ---------                               -----------         ------                     Extra Blue Top Tube[[id number]]                              In process                 Extra Red Top Tube[[id number]]                               In process                 Extra Green Top (Lithium...[[id number]]                      In process                 Extra Purple Top Tube[[id number]]                            In process                   Please view results for these tests on the individual orders.           Signed Electronically by: Katie Yoder MD

## 2024-02-22 ENCOUNTER — ALLIED HEALTH/NURSE VISIT (OUTPATIENT)
Dept: EDUCATION SERVICES | Facility: CLINIC | Age: 79
End: 2024-02-22
Payer: COMMERCIAL

## 2024-02-22 DIAGNOSIS — E11.40 TYPE 2 DIABETES MELLITUS WITH DIABETIC NEUROPATHY, WITH LONG-TERM CURRENT USE OF INSULIN (H): Primary | ICD-10-CM

## 2024-02-22 DIAGNOSIS — Z79.4 TYPE 2 DIABETES MELLITUS WITH DIABETIC NEUROPATHY, WITH LONG-TERM CURRENT USE OF INSULIN (H): Primary | ICD-10-CM

## 2024-02-22 PROCEDURE — G0108 DIAB MANAGE TRN  PER INDIV: HCPCS | Performed by: DIETITIAN, REGISTERED

## 2024-02-22 RX ORDER — BLOOD-GLUCOSE SENSOR
1 EACH MISCELLANEOUS
Qty: 2 EACH | Refills: 5 | Status: SHIPPED | OUTPATIENT
Start: 2024-02-22 | End: 2024-02-22

## 2024-02-22 RX ORDER — BLOOD-GLUCOSE SENSOR
1 EACH MISCELLANEOUS
Qty: 2 EACH | Refills: 5 | Status: SHIPPED | OUTPATIENT
Start: 2024-02-22 | End: 2024-07-25

## 2024-02-22 NOTE — PROGRESS NOTES
Diabetes Self-Management Education & Support    Presents for: Individual review    Type of Service: In Person Visit      ASSESSMENT:  -numbers are starting to come down since starting pioglitazone 2/5/2024  -sent in the berta 3 for patient to [place] specialty (patient thought he was getting from [facility] but was getting from specialty)  -average 160 mg/dL in last week much improvement  -he will call if having lows, he will keep phone in bedroom so he hears the alarms for lows.  -discussed watching his portions on higher carbohydrate foods: potatoes, bread, sweets, rice, pasta.  He does a lot of meals from the [employer] in [place].  If having bread with meal encouraged 1/2 potato portion.    Patient's most recent   Lab Results   Component Value Date    A1C 10.4 02/15/2024    A1C 7.1 10/03/2020     is not meeting goal of <8.0    Diabetes knowledge and skills assessment:   Patient is knowledgeable in diabetes management concepts related to: Healthy Eating, Being Active, Monitoring, and Taking Medication    Continue education with the following diabetes management concepts: Healthy Eating, Being Active, Monitoring, Taking Medication, Problem Solving, Reducing Risks, and Healthy Coping    Based on learning assessment above, most appropriate setting for further diabetes education would be: Individual setting.      PLAN  IF you have the bread with supper then you need to eat only 1/2 the potato in the meal.  Watching your portions on the potatoes helps your numbers.    2.   Once you start the Berta 3 make sure you keep your phone by you at night.  It will alert you if blood sugars is low.    3.  Call me if you are getting low alerts at night, we may need to decrease your dose.        Follow-up: as needed, patient has my phone numbers if averages are increasing or going too low    See Care Plan for co-developed, patient-state behavior change goals.  AVS provided for patient today.    Education Materials Provided:  No new  "materials provided today      SUBJECTIVE/OBJECTIVE:  Presents for: Individual review  Accompanied by: Self  Diabetes type: Type 2  Disease course: Getting harder to manage  Cultural Influences/Ethnic Background:  Not  or       Diabetes Symptoms & Complications:  Diabetes Related Symptoms: Neuropathy  Weight trend: Stable  Symptom course: Stable  Disease course: Getting harder to manage       Patient Problem List and Family Medical History reviewed for relevant medical history, current medical status, and diabetes risk factors.    Vitals:  There were no vitals taken for this visit.  Estimated body mass index is 32.19 kg/m  as calculated from the following:    Height as of 2/15/24: 1.753 m (5' 9\").    Weight as of 2/15/24: 98.9 kg (218 lb).   Last 3 BP:   BP Readings from Last 3 Encounters:   02/15/24 139/88   01/15/24 127/65   11/14/23 139/84       History   Smoking Status    Former    Packs/day: 1.00    Years: 40.00    Types: Cigarettes    Quit date: 11/24/2006   Smokeless Tobacco    Never       Labs:  Lab Results   Component Value Date    A1C 10.4 02/15/2024    A1C 7.1 10/03/2020     Lab Results   Component Value Date     02/15/2024     11/03/2022     07/19/2022     02/22/2021     Lab Results   Component Value Date    LDL 54 08/15/2023    LDL 54 02/22/2021     HDL Cholesterol   Date Value Ref Range Status   02/22/2021 29 (L) >39 mg/dL Final     Direct Measure HDL   Date Value Ref Range Status   08/15/2023 35 (L) >=40 mg/dL Final   ]  GFR Estimate   Date Value Ref Range Status   02/15/2024 70 >60 mL/min/1.73m2 Final   02/22/2021 65 >60 mL/min/[1.73_m2] Final     Comment:     Non  GFR Calc  Starting 12/18/2018, serum creatinine based estimated GFR (eGFR) will be   calculated using the Chronic Kidney Disease Epidemiology Collaboration   (CKD-EPI) equation.       GFR, ESTIMATED POCT   Date Value Ref Range Status   09/13/2022 >60 >60 mL/min/1.73m2 Final     GFR " "Estimate If Black   Date Value Ref Range Status   02/22/2021 76 >60 mL/min/[1.73_m2] Final     Comment:      GFR Calc  Starting 12/18/2018, serum creatinine based estimated GFR (eGFR) will be   calculated using the Chronic Kidney Disease Epidemiology Collaboration   (CKD-EPI) equation.       Lab Results   Component Value Date    CR 1.09 02/15/2024    CR 1.11 02/22/2021     No results found for: \"MICROALBUMIN\"    Healthy Eating:  Meal planning/habits: Avoiding sweets, Frequent snacking  Breakfast: pizza rolls or breakfast bowls 12-16 grams  Lunch: nothing usually  Dinner: lunch special from fuse: meat, potato, veggie or hamburger steak, mashed potatoes, veggie (corn).  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[employer])    Monitoring:  Times checking blood sugar at home (number): 4  Times checking blood sugar at home (per): Day          Taking Medications:  Diabetes Medication(s)       Insulin       insulin glargine (LANTUS PEN) 100 UNIT/ML pen Inject 50 Units Subcutaneous at bedtime Increase by 2 units every 3 days until blood sugars in am under 120 up to max 60 units daily       Insulin Sensitizing Agents       pioglitazone (ACTOS) 15 MG tablet Take 1 tablet (15 mg) by mouth daily            Current Treatments: Insulin Injections    Problem Solving:  Patient carries a carbohydrate source: No    Hypoglycemia Symptoms  Hypoglycemia: None    Hypoglycemia Complications  Hypoglycemia Complications: None    Reducing Risks:   Not assessed    Healthy Coping:     Patient Activation Measure Survey Score:      2/15/2011     9:00 AM   RIDDHI Score (Last Two)   RIDDHI Raw Score 41   Activation Score 63.2   RIDDHI Level 3         Care Plan and Education Provided:  There are no care plans that you recently modified to display for this patient.          Time Spent: 30 " minutes  Encounter Type: Individual    Any diabetes medication dose changes were made via the CDE Protocol per the patient's primary care provider. A copy of this encounter was shared with the provider.

## 2024-02-22 NOTE — PATIENT INSTRUCTIONS
IF you have the bread with supper then you need to eat only 1/2 the potato in the meal.  Watching your portions on the potatoes helps your numbers.    2.   Once you start the Berta 3 make sure you keep your phone by you at night.  It will alert you if blood sugars is low.    3.  Call me if you are getting low alerts at night, we may need to decrease your dose.

## 2024-02-23 ENCOUNTER — ANTICOAGULATION THERAPY VISIT (OUTPATIENT)
Dept: ANTICOAGULATION | Facility: CLINIC | Age: 79
End: 2024-02-23

## 2024-02-23 ENCOUNTER — LAB (OUTPATIENT)
Dept: LAB | Facility: CLINIC | Age: 79
End: 2024-02-23
Payer: COMMERCIAL

## 2024-02-23 DIAGNOSIS — Z79.01 LONG TERM CURRENT USE OF ANTICOAGULANT THERAPY: Chronic | ICD-10-CM

## 2024-02-23 DIAGNOSIS — I48.20 CHRONIC ATRIAL FIBRILLATION (H): Primary | Chronic | ICD-10-CM

## 2024-02-23 LAB — INR BLD: 3.1 (ref 0.9–1.1)

## 2024-02-23 PROCEDURE — 85610 PROTHROMBIN TIME: CPT

## 2024-02-23 PROCEDURE — 36416 COLLJ CAPILLARY BLOOD SPEC: CPT

## 2024-02-23 NOTE — PROGRESS NOTES
ANTICOAGULATION MANAGEMENT     Antoine Russo 78 year old male is on warfarin with supratherapeutic INR result. (Goal INR 2.0-3.0)    Recent labs: (last 7 days)     02/23/24  0649   INR 3.1*       ASSESSMENT     Source(s): Chart Review and Patient/Caregiver Call     Warfarin doses taken: Warfarin taken as instructed  Diet: No new diet changes identified  Medication/supplement changes: None noted  New illness, injury, or hospitalization: No  Signs or symptoms of bleeding or clotting: No  Previous result: Subtherapeutic  Additional findings: None       PLAN     Recommended plan for no diet, medication or health factor changes affecting INR     Dosing Instructions: decrease your warfarin dose (9.1% change) with next INR in 2 weeks       Summary  As of 2/23/2024      Full warfarin instructions:  2.5 mg every Mon, Wed, Fri; 1.25 mg all other days   Next INR check:  3/8/2024               Telephone call with Poli or Saleem who verbalizes understanding and agrees to plan    Lab visit scheduled    Education provided:   Please call back if any changes to your diet, medications or how you've been taking warfarin  Goal range and lab monitoring: goal range and significance of current result and Importance of therapeutic range    Plan made per [facility] anticoagulation protocol    Bertha Ruff RN  Anticoagulation Clinic  2/23/2024    _______________________________________________________________________     Anticoagulation Episode Summary       Current INR goal:  2.0-3.0   TTR:  85.2% (1 y)   Target end date:  Indefinite   Send INR reminders to:  [facility]    Indications    Atrial fibrillation (H) [I48.91]  Long term current use of anticoagulant therapy [Z79.01]  Chronic atrial fibrillation (H) [I48.20]             Comments:               Anticoagulation Care Providers       Provider Role Specialty Phone number    Katie Martino MD Referring Family Medicine [phone]    Biranna Matute APRN CNP  [facility] Family [facility] [phone]

## 2024-02-23 NOTE — PROGRESS NOTES
"ANTICOAGULATION FOLLOW-UP CLINIC VISIT    Patient Name:  Antoine Russo  Date:  4/19/2019  Contact Type:  chart review only    SUBJECTIVE:     Patient Findings     Positives:   Change in medications, Hospital admission    Comments:   Patient hospitalized 4-16 to 4-19 for Bacteriuria with pyuria and acute on chronic pancreatitis . He was advised to \"Continue cephalexin 500 mg TID through 4/20/19\". Warfarin dosing unchanged.               OBJECTIVE    INR   Date Value Ref Range Status   04/19/2019 2.66 (H) 0.86 - 1.14 Final       ASSESSMENT / PLAN  No question data found.  Anticoagulation Summary  As of 4/19/2019    INR goal:   2.0-3.0   TTR:   65.9 % (10.5 mo)   INR used for dosing:      Warfarin maintenance plan:   2.5 mg (2.5 mg x 1) every day   Full warfarin instructions:   2.5 mg every day   Weekly warfarin total:   17.5 mg   No change documented:   Aparna Kunz RN   Plan last modified:   Angela Harrell RN (6/21/2018)   Next INR check:   5/2/2019   Priority:   INR   Target end date:   Indefinite    Indications    Chronic atrial fibrillation (H) [I48.2]  Long term current use of anticoagulant therapy [Z79.01]             Anticoagulation Episode Summary     INR check location:       Preferred lab:       Send INR reminders to:   NB ANTICO CLINIC POOL    Comments:   * 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    HPI:     Chief Complaint   Patient presents with    Erectile Dysfuntion     Pt has noticed improvement after cutting other rx amitriptyline        34-year-old male in follow-up after previous visit November 27, 2023 with a history of erectile dysfunction responsive to sildenafil 100 mg as needed.  Had been on amitriptyline for chronic history of headaches prescribed by neurologist.  He has been working with a neurologist to taper down the dose and he went from 100 mg to 50 mg with significant improvement in his erectile function.  He is working to get off this medicine altogether now.  No urination complaints.  No side effects reported to the sildenafil.  He reports normal libido.      HISTORY:  Past Medical History:   Diagnosis Date    History of abdominal pain     Insomnia       Past Surgical History:   Procedure Laterality Date    COLONOSCOPY      EGD N/A 4/14/2017    Procedure: ESOPHAGOGASTRODUODENOSCOPY, COLONOSCOPY, POSSIBLE BIOPSY, POSSIBLE POLYPECTOMY 91024, 08019;  Surgeon: Stan Grant MD;  Location: [facility], [facility]      Family History   Problem Relation Age of Onset    Hypertension Maternal Grandmother     Asthma Maternal Grandmother     Cancer Maternal Grandmother         Skin cancer    Cancer Maternal Grandfather         Throat cancer    Anemia Father     Heart Disorder Father         Bicuspid aortic valve    Hypertension Father     Pulmonary Disease Father         Scleroderma causing interstitial lung disease    Anemia Mother     Heart Disorder Paternal Grandfather         Subacute myocarditis    Hypertension Paternal Grandmother       Social History:   Social History     Socioeconomic History    Marital status:    Tobacco Use    Smoking status: Never    Smokeless tobacco: Never   Vaping Use    Vaping Use: Never used   Substance and Sexual Activity    Alcohol use: No     Alcohol/week: 0.0 standard drinks of alcohol    Drug use: No   Other  Topics Concern    Caffeine Concern Yes     Comment: daily    Exercise No    Reaction to local anesthetic No    Pt has a pacemaker No    Pt has a defibrillator No        Medications (Active prior to today's visit):  Current Outpatient Medications   Medication Sig Dispense Refill    Sildenafil Citrate 100 MG Oral Tab Take 1 tablet (100 mg total) by mouth daily as needed for Erectile Dysfunction. 5 tablet 5    gabapentin 600 MG Oral Tab Take 1 tablet (600 mg total) by mouth 3 (three) times daily. 90 tablet 5    minoxidil 2.5 MG Oral Tab Take 1 tablet (2.5 mg total) by mouth daily. 180 tablet 0    Multiple Vitamin (MULTIVITAMIN ADULT OR) Take 1 tablet by mouth daily.      Amitriptyline HCl 100 MG Oral Tab TAKE 1 TABLET(100 MG) BY MOUTH EVERY NIGHT (Patient taking differently: Take 0.5 tablets (50 mg total) by mouth nightly. TAKE 1 TABLET(100 MG) BY MOUTH EVERY NIGHT) 30 tablet 5    nystatin-triamcinolone 100,000-0.1 Units/g-% External Ointment Apply twice daily  Monday-Friday to affected areas of rash in armpits . 60 g 3    ketoconazole 2 % External Shampoo His body wash on chest and back 3 times weekly.  Lather onto skin and leave on for several minutes before washing off. 120 mL 12    clobetasol 0.05 % External Ointment Apply 1 Application. topically 2 (two) times daily. (Patient not taking: Reported on 12/14/2023) 30 g 2    Ciclopirox 8 % External Solution Apply 1 Application topically nightly. 1 each 3    Melatonin 5 MG Oral Tab Take 1 tablet (5 mg total) by mouth as needed.      acetaminophen 500 MG Oral Tab Take 1 tablet (500 mg total) by mouth every 6 (six) hours as needed for Pain.         Allergies:  No Known Allergies      ROS:       PHYSICAL EXAM:        ASSESSMENT/PLAN:   Assessment   Encounter Diagnosis   Name Primary?    Other male erectile dysfunction Yes       Recommend:  - Will increase the dose from 50 to 100 mg of sildenafil as he is taking 2 tablets of the 50s anyways.  - Follow-up otherwise in 1  year.         Orders This Visit:  No orders of the defined types were placed in this encounter.      Meds This Visit:  Requested Prescriptions     Signed Prescriptions Disp Refills    Sildenafil Citrate 100 MG Oral Tab 5 tablet 5     Sig: Take 1 tablet (100 mg total) by mouth daily as needed for Erectile Dysfunction.       Imaging & Referrals:  None     2/23/2024  Suri Rogers MD

## 2024-03-13 ENCOUNTER — DOCUMENTATION ONLY (OUTPATIENT)
Dept: ANTICOAGULATION | Facility: CLINIC | Age: 79
End: 2024-03-13

## 2024-03-13 ENCOUNTER — ANTICOAGULATION THERAPY VISIT (OUTPATIENT)
Dept: ANTICOAGULATION | Facility: CLINIC | Age: 79
End: 2024-03-13

## 2024-03-13 ENCOUNTER — LAB (OUTPATIENT)
Dept: LAB | Facility: CLINIC | Age: 79
End: 2024-03-13
Payer: COMMERCIAL

## 2024-03-13 DIAGNOSIS — I48.20 CHRONIC ATRIAL FIBRILLATION (H): Primary | Chronic | ICD-10-CM

## 2024-03-13 DIAGNOSIS — Z79.01 LONG TERM CURRENT USE OF ANTICOAGULANT THERAPY: ICD-10-CM

## 2024-03-13 DIAGNOSIS — Z79.01 LONG TERM CURRENT USE OF ANTICOAGULANT THERAPY: Chronic | ICD-10-CM

## 2024-03-13 DIAGNOSIS — I48.20 CHRONIC ATRIAL FIBRILLATION (H): Primary | ICD-10-CM

## 2024-03-13 LAB — INR BLD: 2 (ref 0.9–1.1)

## 2024-03-13 PROCEDURE — 85610 PROTHROMBIN TIME: CPT

## 2024-03-13 PROCEDURE — 36416 COLLJ CAPILLARY BLOOD SPEC: CPT

## 2024-03-13 NOTE — PROGRESS NOTES
ANTICOAGULATION MANAGEMENT     Antoine Russo 78 year old male is on warfarin with therapeutic INR result. (Goal INR 2.0-3.0)    Recent labs: (last 7 days)     03/13/24  0658   INR 2.0*       ASSESSMENT     Warfarin Lab Questionnaire    Warfarin Doses Last 7 Days          3/12/2024   Warfarin Lab Questionnaire   Missed doses within past 14 days? No   Changes in diet or alcohol within past 14 days? No   Medication changes since last result? No   Injuries or illness since last result? No   New shortness of breath, severe headaches or sudden changes in vision since last result? No   Abnormal bleeding since last result? No   Upcoming surgery, procedure? No   Best number to call with results? [phone]     Previous result: Supratherapeutic  Additional findings: None       PLAN     Recommended plan for no diet, medication or health factor changes affecting INR     Dosing Instructions: Continue your current warfarin dose with next INR in 3 weeks       Summary  As of 3/13/2024      Full warfarin instructions:  2.5 mg every Mon, Wed, Fri; 1.25 mg all other days   Next INR check:  4/3/2024               Telephone call with Poli or Saleem who verbalizes understanding and agrees to plan    Lab visit scheduled    Education provided:   Contact [phone]  with any changes, questions or concerns.     Plan made per [facility] anticoagulation protocol    Angela DO RN  Anticoagulation Clinic  3/13/2024    _______________________________________________________________________     Anticoagulation Episode Summary       Current INR goal:  2.0-3.0   TTR:  86.4% (1 y)   Target end date:  Indefinite   Send INR reminders to:  [facility]    Indications    Atrial fibrillation (H) [I48.91]  Long term current use of anticoagulant therapy [Z79.01]  Chronic atrial fibrillation (H) [I48.20]             Comments:               Anticoagulation Care Providers       Provider Role Specialty Phone number    Katie Martino MD Referring  Family Medicine [phone]    Brianna Matute APRN [facility] Referring Family Medicine [phone]

## 2024-03-13 NOTE — PROGRESS NOTES
ANTICOAGULATION CLINIC REFERRAL RENEWAL REQUEST       An annual renewal order is required for all patients referred to [facility]ticoagulation [facility].?  Please review and sign the pended referral order for Antoine Russo.       ANTICOAGULATION SUMMARY      Warfarin indication(s)   Atrial Fibrillation    Mechanical heart valve present?  NO       Current goal range   INR: 2.0-3.0     Goal appropriate for indication? Goal INR 2-3, standard for indication(s) above     Time in Therapeutic Range (TTR)  (Goal > 60%) 85.2%       Office visit with referring provider's group within last year yes on 2/15/24       [facility]

## 2024-03-20 NOTE — NURSING NOTE
"Chief Complaint   Patient presents with     Hospital F/U     4/16-4/19- pancreatitis       Initial /66 (BP Location: Right arm, Patient Position: Chair, Cuff Size: Adult Regular)   Pulse 80   Temp 97.5  F (36.4  C) (Tympanic)   Resp 16   Ht 1.778 m (5' 10\")   Wt 89.4 kg (197 lb)   SpO2 97%   BMI 28.27 kg/m   Estimated body mass index is 28.27 kg/m  as calculated from the following:    Height as of this encounter: 1.778 m (5' 10\").    Weight as of this encounter: 89.4 kg (197 lb).    Patient presents to the clinic using No DME    Health Maintenance that is potentially due pending provider review:  NONE    n/a    Is there anyone who you would like to be able to receive your results? No  If yes have patient fill out VIDYA    " Mild hernia and prolapse. Measured her for a belt. Ordered CT.

## 2024-04-03 ENCOUNTER — LAB (OUTPATIENT)
Dept: LAB | Facility: CLINIC | Age: 79
End: 2024-04-03
Payer: COMMERCIAL

## 2024-04-03 ENCOUNTER — ANTICOAGULATION THERAPY VISIT (OUTPATIENT)
Dept: ANTICOAGULATION | Facility: CLINIC | Age: 79
End: 2024-04-03

## 2024-04-03 DIAGNOSIS — I48.20 CHRONIC ATRIAL FIBRILLATION (H): ICD-10-CM

## 2024-04-03 DIAGNOSIS — Z79.01 LONG TERM CURRENT USE OF ANTICOAGULANT THERAPY: Chronic | ICD-10-CM

## 2024-04-03 DIAGNOSIS — I48.20 CHRONIC ATRIAL FIBRILLATION (H): Primary | Chronic | ICD-10-CM

## 2024-04-03 DIAGNOSIS — Z79.01 LONG TERM CURRENT USE OF ANTICOAGULANT THERAPY: ICD-10-CM

## 2024-04-03 LAB — INR BLD: 1.9 (ref 0.9–1.1)

## 2024-04-03 PROCEDURE — 85610 PROTHROMBIN TIME: CPT

## 2024-04-03 PROCEDURE — 36416 COLLJ CAPILLARY BLOOD SPEC: CPT

## 2024-04-03 NOTE — PROGRESS NOTES
ANTICOAGULATION MANAGEMENT     Anotine Russo 78 year old male is on warfarin with subtherapeutic INR result. (Goal INR 2.0-3.0)    Recent labs: (last 7 days)     04/03/24  0650   INR 1.9*       ASSESSMENT     Warfarin Lab Questionnaire    Warfarin Doses Last 7 Days      4/2/2024    10:16 AM   Dose in Tablet or Mg   TAB or MG? milligram (mg)     Pt Rptd Dose SUNDAY MONDAY TUESDAY WED THURS FRIDAY SATURDAY 4/2/2024  10:16 AM 1.25 2.5 1.25 2.5 1.25 2.5 1.25         4/2/2024   Warfarin Lab Questionnaire   Missed doses within past 14 days? No   Changes in diet or alcohol within past 14 days? Yes - taco salad last night with greens   Medication changes since last result? No   Injuries or illness since last result? No   New shortness of breath, severe headaches or sudden changes in vision since last result? No   Abnormal bleeding since last result? No   Upcoming surgery, procedure? No   Best number to call with results? [phone]     Previous result: Therapeutic last visit; previously outside of goal range  Additional findings: None     PLAN     Recommended plan for temporary change(s) affecting INR     Dosing Instructions: booster dose then continue your current warfarin dose with next INR in 2 weeks       Summary  As of 4/3/2024      Full warfarin instructions:  4/3: 3.75 mg; Otherwise 2.5 mg every Mon, Wed, Fri; 1.25 mg all other days   Next INR check:  4/15/2024               Telephone call with Poli or Saleem who verbalizes understanding and agrees to plan    Lab visit scheduled    Education provided:   Please call back if any changes to your diet, medications or how you've been taking warfarin  Symptom monitoring: monitoring for clotting signs and symptoms and monitoring for stroke signs and symptoms    Plan made per [facility] anticoagulation protocol    Madhavi Sanders RN  Anticoagulation Clinic  4/3/2024    _______________________________________________________________________     Anticoagulation Episode Summary        Current INR goal:  2.0-3.0   TTR:  82.4% (1 y)   Target end date:  Indefinite   Send INR reminders to:  [facility]    Indications    Atrial fibrillation (H) [I48.91]  Long term current use of anticoagulant therapy [Z79.01]  Chronic atrial fibrillation (H) [I48.20]             Comments:               Anticoagulation Care Providers       Provider Role Specialty Phone number    Katie Martino MD Referring Family Medicine [phone]    Brianna Matute APRN [facility] Referring Family Medicine [phone]    Myrna Clark MD Referring [facility] [phone]

## 2024-04-15 ENCOUNTER — LAB (OUTPATIENT)
Dept: LAB | Facility: CLINIC | Age: 79
End: 2024-04-15
Payer: COMMERCIAL

## 2024-04-15 ENCOUNTER — ANTICOAGULATION THERAPY VISIT (OUTPATIENT)
Dept: ANTICOAGULATION | Facility: CLINIC | Age: 79
End: 2024-04-15

## 2024-04-15 DIAGNOSIS — Z79.01 LONG TERM CURRENT USE OF ANTICOAGULANT THERAPY: ICD-10-CM

## 2024-04-15 DIAGNOSIS — Z79.01 LONG TERM CURRENT USE OF ANTICOAGULANT THERAPY: Chronic | ICD-10-CM

## 2024-04-15 DIAGNOSIS — I48.20 CHRONIC ATRIAL FIBRILLATION (H): ICD-10-CM

## 2024-04-15 DIAGNOSIS — I48.20 CHRONIC ATRIAL FIBRILLATION (H): Primary | Chronic | ICD-10-CM

## 2024-04-15 LAB — INR BLD: 1.8 (ref 0.9–1.1)

## 2024-04-15 PROCEDURE — 85610 PROTHROMBIN TIME: CPT

## 2024-04-15 PROCEDURE — 36416 COLLJ CAPILLARY BLOOD SPEC: CPT

## 2024-04-15 NOTE — PROGRESS NOTES
ANTICOAGULATION MANAGEMENT     Antoine Russo 78 year old male is on warfarin with subtherapeutic INR result. (Goal INR 2.0-3.0)    Recent labs: (last 7 days)     04/15/24  0655   INR 1.8*       ASSESSMENT     Warfarin Lab Questionnaire    Warfarin Doses Last 7 Days      4/14/2024     9:06 AM   Dose in Tablet or Mg   TAB or MG? milligram (mg)     Pt Rptd Dose SUNDAY MONDAY TUESDAY WED THURS FRIDAY SATURDAY 4/14/2024   9:06 AM 1.25 2.5 1.25 2.5 1.25 2.5 1.25         4/14/2024   Warfarin Lab Questionnaire   Missed doses within past 14 days? No   Changes in diet or alcohol within past 14 days? No   Medication changes since last result? No   Injuries or illness since last result? No   New shortness of breath, severe headaches or sudden changes in vision since last result? No   Abnormal bleeding since last result? No   Upcoming surgery, procedure? No   Best number to call with results? [phone]     Previous result: Subtherapeutic  Additional findings: None     PLAN     Recommended plan for no diet, medication or health factor changes affecting INR     Dosing Instructions: Increase your warfarin dose (10% change) with next INR in 2 weeks       Summary  As of 4/15/2024      Full warfarin instructions:  1.25 mg every Sun, Tue, Thu; 2.5 mg all other days   Next INR check:  4/26/2024               Telephone call with Poli or Saleem who verbalizes understanding and agrees to plan    Lab visit scheduled    Education provided:   Please call back if any changes to your diet, medications or how you've been taking warfarin  Symptom monitoring: monitoring for clotting signs and symptoms and monitoring for stroke signs and symptoms    Plan made per [facility] anticoagulation protocol    Madhavi Sanders RN  Anticoagulation Clinic  4/15/2024    _______________________________________________________________________     Anticoagulation Episode Summary       Current INR goal:  2.0-3.0   TTR:  79.1% (1 y)   Target end date:  Indefinite    Send INR reminders to:  [facility]    Indications    Atrial fibrillation (H) [I48.91]  Long term current use of anticoagulant therapy [Z79.01]  Chronic atrial fibrillation (H) [I48.20]             Comments:               Anticoagulation Care Providers       Provider Role Specialty Phone number    Katie Martino MD Referring Family Medicine [phone]    Brianna Matute APRN CNP Referring Family Medicine [phone]    Myrna Clark MD Referring Family Medicine [phone]

## 2024-04-21 DIAGNOSIS — I10 HYPERTENSION, BENIGN ESSENTIAL, GOAL BELOW 140/90: ICD-10-CM

## 2024-04-22 RX ORDER — METOPROLOL SUCCINATE 50 MG/1
TABLET, EXTENDED RELEASE ORAL
Qty: 270 TABLET | Refills: 2 | Status: SHIPPED | OUTPATIENT
Start: 2024-04-22

## 2024-04-28 NOTE — PROGRESS NOTES
Medication Therapy Management (MTM) Encounter    ASSESSMENT:                            Medication Adherence/Access: See below for considerations.    Type 2 Diabetes: Patient is not meeting A1c goal of < 8%. Self monitoring of blood glucose is not at goal of fasting  mg/dL and post prandial < 180 mg/dL. Patient is not meeting goal of > 70% time in target with continuous glucose monitoring. CGM report shows improvement with GMI of 7.5% vs last A1c of 10.4%. Unable to increase Actos dose further due to edema concerns. Patient opts to await next A1c result then may reconsider adding back Jardiance if necessary.    Afib/Hypertension/Hyperlipidemia/PAD/CAD/CKD: Patient is not meeting blood pressure goal of < 140/90mmHg (per problem list). Patient is on appropriate high-intensity statin therapy. Patient would benefit from restarting low-dose aspirin therapy as directed by vascular surgeon. Last INR not at goal of 2-3 and monitoring plan in place with anticoag team.     PLAN:                            Keep upcoming visit with Dr. Clark on May 20th for A1c recheck.  Reminder to start aspirin 81mg daily or at least every other day if bruising too much.  Continue working on diet by reducing carb intake (potatoes, bread, sweets, pasta, rice).  Future considerations: Add Jardiance back in if next A1c elevated and patient agreeable to cost?    Follow-up: Return in 3 weeks (on 5/20/2024) for Primary Care Provider Visit, Diabetes Recheck.    SUBJECTIVE/OBJECTIVE:                          Poli Russo is a 78 year old male coming in for a follow-up visit.       Reason for visit: Recheck blood sugars.    Allergies/ADRs: Reviewed in chart  Past Medical History: Reviewed in chart  Tobacco: He reports that he quit smoking about 17 years ago. His smoking use included cigarettes. He started smoking about 57 years ago. He has a 40 pack-year smoking history. He has never used smokeless tobacco.  Alcohol: 3-4 beers  daily    Medication Adherence/Access: Issues reported - see below.    Type 2 Diabetes:    Lantus 50 units every evening.   Pioglitazone 15mg daily (started at MTM visit on 2/5/24).  Patient reports the following medication side effects: slightly worse swelling but not too bad.  Previously on Jardiance but stopped because too expensive, which he can afford but prefers not. His income would be too high for assistance program. Noted history of recurrent pancreatitis. Previously metformin ER caused diarrhea problems.   Blood sugar monitoring: Continuous Glucose Monitor - Freestyle Berta 3, upgraded and uses cell phone, see report below.   Current diabetes symptoms: none.  Diet/Exercise: This morning ate bowl of sausage, gravy, potatoes, and cheese. Dinner likes hot dish or pasta.  Follows with Ruthann RICK - last visit on 2/22/24.  Eye exam: up to date  Foot exam: due        Lab Results   Component Value Date    UMALCR 1,203.94 (H) 11/14/2023     Lab Results   Component Value Date    A1C 10.4 02/15/2024    A1C 9.1 11/14/2023    A1C 8.9 08/15/2023    A1C 8.7 05/11/2023    A1C 7.4 01/24/2023    A1C 7.1 10/03/2020    A1C 6.3 05/12/2020    A1C 6.5 02/04/2020    A1C 9.0 10/17/2019    A1C 9.7 07/18/2019     Afib/Hypertension/Hyperlipidemia/PAD/CAD/CKD:   Lisinopril 10mg daily.  Metoprolol ER 75mg twice daily.  Atorvastatin 80mg daily.  Aspirin 81mg daily - hasn't started yet due to bleeding concerns (see MyChart on 1/15/24).  Warfarin 1.25mg on Sun/Tue/Thu and 2.5mg on all other days as directed.   Patient does not self-monitor blood pressure.   Patient reports no current medication side effects. States left arm bruises easily, however no issues with right arm and no other bleeding concerns. Patient does have a history of GI bleed.  Follows with vascular surgery - has 1 year follow up in mid July.      [id number]-PIHg Score    Date Calculated: 10/20/2022  7:33 AM  [id number] Score: 4        BP Readings from Last 3 Encounters:    04/29/24 (!) 143/77   02/15/24 139/88   01/15/24 127/65     Pulse Readings from Last 3 Encounters:   04/29/24 75   02/15/24 77   01/15/24 68     Wt Readings from Last 4 Encounters:   04/29/24 221 lb 9.6 oz (100.5 kg)   02/15/24 218 lb (98.9 kg)   11/14/23 220 lb (99.8 kg)   08/15/23 213 lb 6.4 oz (96.8 kg)     INR   Date Value Ref Range Status   04/15/2024 1.8 (H) 0.9 - 1.1 Final     Recent Labs   Lab Test 08/15/23  0733 10/20/22  0736   CHOL 113 137   HDL 35* 36*   LDL 54 67   TRIG 120 170*     Estimated Creatinine Clearance: 67.4 mL/min (based on SCr of 1.09 mg/dL).    Last Comprehensive Metabolic Panel:  Lab Results   Component Value Date     02/15/2024    POTASSIUM 5.0 02/15/2024    CHLORIDE 101 02/15/2024    CO2 29 02/15/2024    ANIONGAP 9 02/15/2024     (H) 02/15/2024    BUN 17.0 02/15/2024    CR 1.09 02/15/2024    GFRESTIMATED 70 02/15/2024    NILSON 10.2 02/15/2024     Today's Vitals: BP (!) 143/77   Pulse 75   Wt 221 lb 9.6 oz (100.5 kg)   BMI 32.72 kg/m    ----------------    I spent 25 minutes with this patient today. All changes were made via collaborative practice agreement with Myrna Clark MD. A copy of the visit note was provided to the patient's provider(s).    A summary of these recommendations was sent via [address].    Ivette Bell, PharmD, BCACP  Medication Therapy Management Pharmacist  [facility]     Medication Therapy Recommendations  CAD (coronary artery disease)    Current Medication: aspirin 81 MG EC tablet   Rationale: Patient forgets to take - Adherence - Adherence   Recommendation: Provide Adherence Intervention - aspirin 81 MG Tbec - 81mg every other day   Status: Accepted per CPA

## 2024-04-29 ENCOUNTER — OFFICE VISIT (OUTPATIENT)
Dept: PHARMACY | Facility: CLINIC | Age: 79
End: 2024-04-29
Payer: COMMERCIAL

## 2024-04-29 VITALS
DIASTOLIC BLOOD PRESSURE: 77 MMHG | HEART RATE: 75 BPM | BODY MASS INDEX: 32.72 KG/M2 | SYSTOLIC BLOOD PRESSURE: 143 MMHG | WEIGHT: 221.6 LBS

## 2024-04-29 DIAGNOSIS — E78.5 HYPERLIPIDEMIA LDL GOAL <100: ICD-10-CM

## 2024-04-29 DIAGNOSIS — E11.42 TYPE 2 DIABETES MELLITUS WITH DIABETIC POLYNEUROPATHY, WITHOUT LONG-TERM CURRENT USE OF INSULIN (H): Primary | ICD-10-CM

## 2024-04-29 DIAGNOSIS — I48.20 CHRONIC ATRIAL FIBRILLATION (H): ICD-10-CM

## 2024-04-29 DIAGNOSIS — N18.2 CHRONIC KIDNEY DISEASE, STAGE 2 (MILD): ICD-10-CM

## 2024-04-29 DIAGNOSIS — I10 HYPERTENSION, BENIGN ESSENTIAL, GOAL BELOW 140/90: ICD-10-CM

## 2024-04-29 DIAGNOSIS — I73.9 PERIPHERAL VASCULAR DISEASE (H): ICD-10-CM

## 2024-04-29 DIAGNOSIS — I25.10 CORONARY ARTERY DISEASE INVOLVING NATIVE HEART, UNSPECIFIED VESSEL OR LESION TYPE, UNSPECIFIED WHETHER ANGINA PRESENT: ICD-10-CM

## 2024-04-29 PROCEDURE — 99606 MTMS BY PHARM EST 15 MIN: CPT | Performed by: PHARMACIST

## 2024-04-29 PROCEDURE — 99607 MTMS BY PHARM ADDL 15 MIN: CPT | Performed by: PHARMACIST

## 2024-05-03 ENCOUNTER — LAB (OUTPATIENT)
Dept: LAB | Facility: CLINIC | Age: 79
End: 2024-05-03
Payer: COMMERCIAL

## 2024-05-03 ENCOUNTER — ANTICOAGULATION THERAPY VISIT (OUTPATIENT)
Dept: ANTICOAGULATION | Facility: CLINIC | Age: 79
End: 2024-05-03

## 2024-05-03 DIAGNOSIS — Z79.01 LONG TERM CURRENT USE OF ANTICOAGULANT THERAPY: Chronic | ICD-10-CM

## 2024-05-03 DIAGNOSIS — Z79.01 LONG TERM CURRENT USE OF ANTICOAGULANT THERAPY: ICD-10-CM

## 2024-05-03 DIAGNOSIS — I48.20 CHRONIC ATRIAL FIBRILLATION (H): ICD-10-CM

## 2024-05-03 DIAGNOSIS — I48.91 ATRIAL FIBRILLATION (H): Primary | Chronic | ICD-10-CM

## 2024-05-03 LAB — INR BLD: 2.2 (ref 0.9–1.1)

## 2024-05-03 PROCEDURE — 85610 PROTHROMBIN TIME: CPT

## 2024-05-03 PROCEDURE — 36416 COLLJ CAPILLARY BLOOD SPEC: CPT

## 2024-05-03 NOTE — PROGRESS NOTES
ANTICOAGULATION MANAGEMENT     Antoine Russo 78 year old male is on warfarin with therapeutic INR result. (Goal INR 2.0-3.0)    Recent labs: (last 7 days)     05/03/24  0750   INR 2.2*       ASSESSMENT     Source(s): Chart Review and Patient/Caregiver Call     Warfarin doses taken: Warfarin taken as instructed  Diet: No new diet changes identified  Medication/supplement changes: None noted  New illness, injury, or hospitalization: No  Signs or symptoms of bleeding or clotting: No  Previous result: Subtherapeutic  Additional findings: None       PLAN     Recommended plan for no diet, medication or health factor changes affecting INR     Dosing Instructions: Continue your current warfarin dose with next INR in 3 weeks       Summary  As of 5/3/2024      Full warfarin instructions:  1.25 mg every Sun, Tue, Thu; 2.5 mg all other days   Next INR check:  5/20/2024               Telephone call with Poli or Saleem who verbalizes understanding and agrees to plan    Check at provider office visit    Education provided:   Please call back if any changes to your diet, medications or how you've been taking warfarin  Contact [phone]  with any changes, questions or concerns.     Plan made per [facility] anticoagulation protocol    Aparna Kunz RN  Anticoagulation Clinic  5/3/2024    _______________________________________________________________________     Anticoagulation Episode Summary       Current INR goal:  2.0-3.0   TTR:  76.6% (1 y)   Target end date:  Indefinite   Send INR reminders to:  [facility]    Indications    Atrial fibrillation (H) [I48.91]  Long term current use of anticoagulant therapy [Z79.01]  Chronic atrial fibrillation (H) [I48.20]             Comments:               Anticoagulation Care Providers       Provider Role Specialty Phone number    Katie Martino MD Referring Family Medicine [phone]    Brianna Matute APRN CNP Referring Family Medicine [phone]    Myrna Clark  MD [facility] Medicine [phone]

## 2024-05-05 DIAGNOSIS — E11.40 TYPE 2 DIABETES MELLITUS WITH DIABETIC NEUROPATHY, WITH LONG-TERM CURRENT USE OF INSULIN (H): ICD-10-CM

## 2024-05-05 DIAGNOSIS — Z79.4 TYPE 2 DIABETES MELLITUS WITH DIABETIC NEUROPATHY, WITH LONG-TERM CURRENT USE OF INSULIN (H): ICD-10-CM

## 2024-05-06 RX ORDER — PREGABALIN 150 MG/1
CAPSULE ORAL
Qty: 60 CAPSULE | Refills: 0 | Status: SHIPPED | OUTPATIENT
Start: 2024-05-06 | End: 2024-06-10

## 2024-05-20 ENCOUNTER — OFFICE VISIT (OUTPATIENT)
Dept: FAMILY MEDICINE | Facility: CLINIC | Age: 79
End: 2024-05-20
Payer: COMMERCIAL

## 2024-05-20 ENCOUNTER — ANTICOAGULATION THERAPY VISIT (OUTPATIENT)
Dept: ANTICOAGULATION | Facility: CLINIC | Age: 79
End: 2024-05-20

## 2024-05-20 VITALS
BODY MASS INDEX: 33.03 KG/M2 | RESPIRATION RATE: 16 BRPM | HEART RATE: 88 BPM | OXYGEN SATURATION: 94 % | DIASTOLIC BLOOD PRESSURE: 64 MMHG | SYSTOLIC BLOOD PRESSURE: 126 MMHG | HEIGHT: 69 IN | WEIGHT: 223 LBS

## 2024-05-20 DIAGNOSIS — R60.0 LOWER EXTREMITY EDEMA: ICD-10-CM

## 2024-05-20 DIAGNOSIS — Z79.01 LONG TERM CURRENT USE OF ANTICOAGULANT THERAPY: ICD-10-CM

## 2024-05-20 DIAGNOSIS — I10 HYPERTENSION, BENIGN ESSENTIAL, GOAL BELOW 140/90: ICD-10-CM

## 2024-05-20 DIAGNOSIS — Z79.01 LONG TERM CURRENT USE OF ANTICOAGULANT THERAPY: Chronic | ICD-10-CM

## 2024-05-20 DIAGNOSIS — N18.2 CHRONIC KIDNEY DISEASE, STAGE 2 (MILD): ICD-10-CM

## 2024-05-20 DIAGNOSIS — I48.20 CHRONIC ATRIAL FIBRILLATION (H): Primary | Chronic | ICD-10-CM

## 2024-05-20 DIAGNOSIS — E11.40 TYPE 2 DIABETES MELLITUS WITH DIABETIC NEUROPATHY, WITH LONG-TERM CURRENT USE OF INSULIN (H): Primary | ICD-10-CM

## 2024-05-20 DIAGNOSIS — R80.1 PERSISTENT PROTEINURIA: ICD-10-CM

## 2024-05-20 DIAGNOSIS — I48.20 CHRONIC ATRIAL FIBRILLATION (H): ICD-10-CM

## 2024-05-20 DIAGNOSIS — Z79.4 TYPE 2 DIABETES MELLITUS WITH DIABETIC NEUROPATHY, WITH LONG-TERM CURRENT USE OF INSULIN (H): Primary | ICD-10-CM

## 2024-05-20 LAB
ALBUMIN SERPL BCG-MCNC: 3.6 G/DL (ref 3.5–5.2)
ALP SERPL-CCNC: 101 U/L (ref 40–150)
ALT SERPL W P-5'-P-CCNC: 21 U/L (ref 0–70)
ANION GAP SERPL CALCULATED.3IONS-SCNC: 11 MMOL/L (ref 7–15)
AST SERPL W P-5'-P-CCNC: 24 U/L (ref 0–45)
BILIRUB SERPL-MCNC: 0.9 MG/DL
BUN SERPL-MCNC: 14.2 MG/DL (ref 8–23)
CALCIUM SERPL-MCNC: 10.3 MG/DL (ref 8.8–10.2)
CHLORIDE SERPL-SCNC: 101 MMOL/L (ref 98–107)
CREAT SERPL-MCNC: 1.06 MG/DL (ref 0.67–1.17)
DEPRECATED HCO3 PLAS-SCNC: 27 MMOL/L (ref 22–29)
EGFRCR SERPLBLD CKD-EPI 2021: 73 ML/MIN/1.73M2
ERYTHROCYTE [DISTWIDTH] IN BLOOD BY AUTOMATED COUNT: 16.5 % (ref 10–15)
GLUCOSE SERPL-MCNC: 219 MG/DL (ref 70–99)
HBA1C MFR BLD: 8 % (ref 0–5.6)
HCT VFR BLD AUTO: 47.2 % (ref 40–53)
HGB BLD-MCNC: 15.4 G/DL (ref 13.3–17.7)
INR BLD: 2.7 (ref 0.9–1.1)
MCH RBC QN AUTO: 33.9 PG (ref 26.5–33)
MCHC RBC AUTO-ENTMCNC: 32.6 G/DL (ref 31.5–36.5)
MCV RBC AUTO: 104 FL (ref 78–100)
PLATELET # BLD AUTO: 142 10E3/UL (ref 150–450)
POTASSIUM SERPL-SCNC: 4.8 MMOL/L (ref 3.4–5.3)
PROT SERPL-MCNC: 7.5 G/DL (ref 6.4–8.3)
RBC # BLD AUTO: 4.54 10E6/UL (ref 4.4–5.9)
SODIUM SERPL-SCNC: 139 MMOL/L (ref 135–145)
WBC # BLD AUTO: 7.9 10E3/UL (ref 4–11)

## 2024-05-20 PROCEDURE — 85610 PROTHROMBIN TIME: CPT | Performed by: STUDENT IN AN ORGANIZED HEALTH CARE EDUCATION/TRAINING PROGRAM

## 2024-05-20 PROCEDURE — 83036 HEMOGLOBIN GLYCOSYLATED A1C: CPT | Performed by: STUDENT IN AN ORGANIZED HEALTH CARE EDUCATION/TRAINING PROGRAM

## 2024-05-20 PROCEDURE — 85027 COMPLETE CBC AUTOMATED: CPT | Performed by: STUDENT IN AN ORGANIZED HEALTH CARE EDUCATION/TRAINING PROGRAM

## 2024-05-20 PROCEDURE — 36415 COLL VENOUS BLD VENIPUNCTURE: CPT | Performed by: STUDENT IN AN ORGANIZED HEALTH CARE EDUCATION/TRAINING PROGRAM

## 2024-05-20 PROCEDURE — 99214 OFFICE O/P EST MOD 30 MIN: CPT | Performed by: STUDENT IN AN ORGANIZED HEALTH CARE EDUCATION/TRAINING PROGRAM

## 2024-05-20 PROCEDURE — 99207 PR FOOT EXAM NO CHARGE: CPT | Performed by: STUDENT IN AN ORGANIZED HEALTH CARE EDUCATION/TRAINING PROGRAM

## 2024-05-20 PROCEDURE — 80053 COMPREHEN METABOLIC PANEL: CPT | Performed by: STUDENT IN AN ORGANIZED HEALTH CARE EDUCATION/TRAINING PROGRAM

## 2024-05-20 PROCEDURE — G2211 COMPLEX E/M VISIT ADD ON: HCPCS | Performed by: STUDENT IN AN ORGANIZED HEALTH CARE EDUCATION/TRAINING PROGRAM

## 2024-05-20 RX ORDER — FUROSEMIDE 20 MG
20 TABLET ORAL DAILY PRN
Qty: 30 TABLET | Refills: 0 | Status: SHIPPED | OUTPATIENT
Start: 2024-05-20 | End: 2024-08-13

## 2024-05-20 RX ORDER — LISINOPRIL 20 MG/1
20 TABLET ORAL DAILY
Qty: 90 TABLET | Refills: 1 | Status: SHIPPED | OUTPATIENT
Start: 2024-05-20

## 2024-05-20 ASSESSMENT — PAIN SCALES - GENERAL: PAINLEVEL: NO PAIN (0)

## 2024-05-20 NOTE — PROGRESS NOTES
Assessment & Plan     Patient is a 78-year-old gentleman presents today to establish care as his prior primary provider has retired.  While here, we also discussed chronic conditions including diabetes, hypertension, ongoing lower extremity swelling    Type 2 diabetes mellitus with diabetic neuropathy, with long-term current use of insulin (H)  Patient is currently taking 50 units daily of Lantus.  In discussion with our Pharm.D. who also cares for the patient, he has previously been on empagliflozin.  Unable to tolerate pioglitazone leg swelling, see below for more details.  In review, patient has previously had side effects from metformin, is a poor candidate for sulfonylurea given ongoing alcohol use and associated risk of hypoglycemia, and has a history of pancreatitis and is not a good candidate for GLP/GIP.  Continue with insulin glargine at current dosing, however could potentially increase slightly pending freestyle jake data.  Will recommend restarting Jardiance to patient as this is the next best step. Will await patient response prior to sending prescription.   - insulin glargine (LANTUS PEN) 100 UNIT/ML pen  Dispense: 60 mL; Refill: 3  - lisinopril (ZESTRIL) 20 MG tablet  Dispense: 90 tablet; Refill: 1  - Hemoglobin A1c  - Hemoglobin A1c  - FOOT EXAM    Lower extremity edema  Patient has been having leg swelling, likely due to the pioglitazone.  He has had difficulty even fitting into some of his shoes.  We will use Lasix for short-term time to assist with leg swelling.  He has been off of pioglitazone for over a month without significant improvement.  In addition, given the leg swelling, which is symmetric and low concern for DVT, we did obtain a CBC and CMP today.  - furosemide (LASIX) 20 MG tablet  Dispense: 30 tablet; Refill: 0  - Comprehensive metabolic panel  - CBC with platelets  - CBC with platelets  - Comprehensive metabolic panel    Hypertension, benign essential, goal below  140/90  Persistent proteinuria  Chronic kidney disease, stage 2 (mild)  Patient is on lisinopril for multiple indications.  Currently blood pressure is well-controlled, refill of medication sent.  - lisinopril (ZESTRIL) 20 MG tablet  Dispense: 90 tablet; Refill: 1    Chronic atrial fibrillation (H)  Long term current use of anticoagulant therapy  Due for INR, collected while in clinic today.  - INR point of care (finger stick)      Blood sugar testing frequency justification:  Risk of hypoglycemia with medication(s)    The longitudinal plan of care for the diagnosis(es)/condition(s) as documented were addressed during this visit. Due to the added complexity in care, I will continue to support Poli or Saleem in the subsequent management and with ongoing continuity of care.    I spent a total of 32 minutes on the day of the visit.   Time spent by me doing chart review, history and exam, documentation and further activities per the note      Subjective   Poli or Saleem is a 78 year old, presenting for the following health issues:  Diabetes and Edema (Bilateral feet)        5/20/2024     7:21 AM   Additional Questions   Roomed by Doris DO   Accompanied by Self     History of Present Illness       Diabetes:   He presents for follow up of diabetes.  He is checking home blood glucose four or more times daily.   He checks blood glucose before and after meals and at bedtime.  Blood glucose is sometimes over 200 and sometimes under 70. He is aware of hypoglycemia symptoms including dizziness and weakness.   He is concerned about blood sugar frequently over 200.   He is having numbness in feet and burning in feet.            He eats 0-1 servings of fruits and vegetables daily.He consumes 1 sweetened beverage(s) daily.He exercises with enough effort to increase his heart rate 10 to 19 minutes per day.  He exercises with enough effort to increase his heart rate 3 or less days per week.   He is taking medications regularly.    "  The leg swelling started when started Pioglitazone. Stopped this but it isn't going away.       Review of Systems  Constitutional, HEENT, cardiovascular, pulmonary, gi and gu systems are negative, except as otherwise noted.      Objective    /64 (BP Location: Right arm, Patient Position: Sitting, Cuff Size: Adult Regular)   Pulse 88   Resp 16   Ht 1.753 m (5' 9\")   Wt 101.2 kg (223 lb)   SpO2 94%   BMI 32.93 kg/m    Body mass index is 32.93 kg/m .  Physical Exam  Constitutional:       Appearance: Normal appearance.   HENT:      Head: Normocephalic.   Eyes:      General: No scleral icterus.     Extraocular Movements: Extraocular movements intact.      Conjunctiva/sclera: Conjunctivae normal.   Cardiovascular:      Rate and Rhythm: Normal rate.   Pulmonary:      Effort: Pulmonary effort is normal.   Musculoskeletal:      Right lower leg: Edema (3+ to upper shin) present.      Left lower leg: Edema (3+ to upper shin) present.   Neurological:      General: No focal deficit present.      Mental Status: He is alert and oriented to person, place, and time.          Diabetic Foot Screen:  Any complaints of increased pain or numbness ?  Not increased, just known  Is there a foot ulcer now or a history of foot ulcer? No  Does the foot have an abnormal shape?  YES flat  Are the nails thick, too long or ingrown?  YES thick  Are there any redness or open areas? No         Sensation Testing done at all points on the diagram with monofilament     Right Foot: Sensation Absent at the following points , 1, 2, 3, 4, 5, 6, and 10  Left Foot: Sensation Absent at the following points , 1, 2, 3, 4, 5, 6, 7, 8, 9, and 10     Risk Category: 2- Loss of protective sensation with weakness, deformity, pre-ulcer or callous but no ulceration  Performed by Myrna Clark MD       Results from this visit  Results for orders placed or performed in visit on 05/20/24   CBC with platelets     Status: Abnormal   Result Value Ref Range "    WBC Count 7.9 4.0 - 11.0 10e3/uL    RBC Count 4.54 4.40 - 5.90 10e6/uL    Hemoglobin 15.4 13.3 - 17.7 g/dL    Hematocrit 47.2 40.0 - 53.0 %     (H) 78 - 100 fL    MCH 33.9 (H) 26.5 - 33.0 pg    MCHC 32.6 31.5 - 36.5 g/dL    RDW 16.5 (H) 10.0 - 15.0 %    Platelet Count 142 (L) 150 - 450 10e3/uL   Comprehensive metabolic panel     Status: Abnormal   Result Value Ref Range    Sodium 139 135 - 145 mmol/L    Potassium 4.8 3.4 - 5.3 mmol/L    Carbon Dioxide (CO2) 27 22 - 29 mmol/L    Anion Gap 11 7 - 15 mmol/L    Urea Nitrogen 14.2 8.0 - 23.0 mg/dL    Creatinine 1.06 0.67 - 1.17 mg/dL    GFR Estimate 73 >60 mL/min/1.73m2    Calcium 10.3 (H) 8.8 - 10.2 mg/dL    Chloride 101 98 - 107 mmol/L    Glucose 219 (H) 70 - 99 mg/dL    Alkaline Phosphatase 101 40 - 150 U/L    AST 24 0 - 45 U/L    ALT 21 0 - 70 U/L    Protein Total 7.5 6.4 - 8.3 g/dL    Albumin 3.6 3.5 - 5.2 g/dL    Bilirubin Total 0.9 <=1.2 mg/dL   Hemoglobin A1c     Status: Abnormal   Result Value Ref Range    Hemoglobin A1C 8.0 (H) 0.0 - 5.6 %    Narrative    Results verified by repeat testing     INR point of care (finger stick)     Status: Abnormal   Result Value Ref Range    INR 2.7 (H) 0.9 - 1.1    Narrative    This test is intended for monitoring Coumadin therapy. Results are not accurate in patients with prolonged INR due to factor deficiency.             Signed Electronically by: Myrna Clark MD

## 2024-05-20 NOTE — PROGRESS NOTES
ANTICOAGULATION MANAGEMENT     Antoine Russo 78 year old male is on warfarin with therapeutic INR result. (Goal INR 2.0-3.0)    Recent labs: (last 7 days)     05/20/24  0814   INR 2.7*       ASSESSMENT     Source(s): Chart Review and Patient/Caregiver Call     Warfarin doses taken: Warfarin taken as instructed  Diet: No new diet changes identified  Medication/supplement changes:  furosemide changes  New illness, injury, or hospitalization: No  Signs or symptoms of bleeding or clotting: No  Previous result: Therapeutic last visit; previously outside of goal range  Additional findings: None     PLAN     Recommended plan for no diet, medication or health factor changes affecting INR     Dosing Instructions: Continue your current warfarin dose with next INR in 4 weeks       Summary  As of 5/20/2024      Full warfarin instructions:  1.25 mg every Sun, Tue, Thu; 2.5 mg all other days   Next INR check:  6/17/2024               Telephone call with Poli or Saleem who verbalizes understanding and agrees to plan    Lab visit scheduled    Education provided:   Please call back if any changes to your diet, medications or how you've been taking warfarin    Plan made per [facility] anticoagulation protocol    Madhavi Sanders RN  Anticoagulation Clinic  5/20/2024    _______________________________________________________________________     Anticoagulation Episode Summary       Current INR goal:  2.0-3.0   TTR:  76.6% (1 y)   Target end date:  Indefinite   Send INR reminders to:  [facility]    Indications    Atrial fibrillation (H) [I48.91]  Long term current use of anticoagulant therapy [Z79.01]  Chronic atrial fibrillation (H) [I48.20]             Comments:               Anticoagulation Care Providers       Provider Role Specialty Phone number    Katie Martino MD Referring Family Medicine [phone]    Brianna Matute APRN CNP Referring Family Medicine [phone]    Myrna Clark MD Referring Family  Medicine [phone]

## 2024-05-30 RX ORDER — ONDANSETRON 2 MG/ML
4 INJECTION INTRAMUSCULAR; INTRAVENOUS EVERY 6 HOURS PRN
Start: 2024-05-30

## 2024-06-09 DIAGNOSIS — E11.40 TYPE 2 DIABETES MELLITUS WITH DIABETIC NEUROPATHY, WITH LONG-TERM CURRENT USE OF INSULIN (H): ICD-10-CM

## 2024-06-09 DIAGNOSIS — Z79.4 TYPE 2 DIABETES MELLITUS WITH DIABETIC NEUROPATHY, WITH LONG-TERM CURRENT USE OF INSULIN (H): ICD-10-CM

## 2024-06-10 RX ORDER — PREGABALIN 150 MG/1
CAPSULE ORAL
Qty: 180 CAPSULE | Refills: 3 | Status: SHIPPED | OUTPATIENT
Start: 2024-06-10

## 2024-06-17 ENCOUNTER — LAB (OUTPATIENT)
Dept: LAB | Facility: CLINIC | Age: 79
End: 2024-06-17
Payer: COMMERCIAL

## 2024-06-17 ENCOUNTER — ANTICOAGULATION THERAPY VISIT (OUTPATIENT)
Dept: ANTICOAGULATION | Facility: CLINIC | Age: 79
End: 2024-06-17

## 2024-06-17 DIAGNOSIS — Z79.01 LONG TERM CURRENT USE OF ANTICOAGULANT THERAPY: Chronic | ICD-10-CM

## 2024-06-17 DIAGNOSIS — Z79.01 LONG TERM CURRENT USE OF ANTICOAGULANT THERAPY: ICD-10-CM

## 2024-06-17 DIAGNOSIS — I48.20 CHRONIC ATRIAL FIBRILLATION (H): Primary | Chronic | ICD-10-CM

## 2024-06-17 DIAGNOSIS — I48.20 CHRONIC ATRIAL FIBRILLATION (H): ICD-10-CM

## 2024-06-17 LAB — INR BLD: 2.3 (ref 0.9–1.1)

## 2024-06-17 PROCEDURE — 85610 PROTHROMBIN TIME: CPT

## 2024-06-17 PROCEDURE — 36416 COLLJ CAPILLARY BLOOD SPEC: CPT

## 2024-06-17 NOTE — PROGRESS NOTES
ANTICOAGULATION MANAGEMENT     Antoine Russo 78 year old male is on warfarin with therapeutic INR result. (Goal INR 2.0-3.0)    Recent labs: (last 7 days)     06/17/24  0722   INR 2.3*       ASSESSMENT     Warfarin Lab Questionnaire    Warfarin Doses Last 7 Days      6/16/2024     5:57 PM   Dose in Tablet or Mg   TAB or MG? milligram (mg)     Pt Rptd Dose SUNDAY MONDAY TUESDAY WED THURS FRIDAY SATURDAY 6/16/2024   5:57 PM 1.25 2.5 1.25 2.5 1.25 2.5 2.5         6/16/2024   Warfarin Lab Questionnaire   Missed doses within past 14 days? No   Changes in diet or alcohol within past 14 days? No   Medication changes since last result? No   Injuries or illness since last result? No   New shortness of breath, severe headaches or sudden changes in vision since last result? No   Abnormal bleeding since last result? No   Upcoming surgery, procedure? No   Best number to call with results? [phone]     Previous result: Therapeutic last 2(+) visits  Additional findings: None     PLAN     Recommended plan for no diet, medication or health factor changes affecting INR     Dosing Instructions: Continue your current warfarin dose with next INR in 5 weeks       Summary  As of 6/17/2024      Full warfarin instructions:  1.25 mg every Sun, Tue, Thu; 2.5 mg all other days   Next INR check:  7/22/2024               Telephone call with Poli or Saleem who verbalizes understanding and agrees to plan    Lab visit scheduled    Education provided:   Please call back if any changes to your diet, medications or how you've been taking warfarin    Plan made per [facility] anticoagulation protocol    Madhavi Sanders RN  Anticoagulation Clinic  6/17/2024    _______________________________________________________________________     Anticoagulation Episode Summary       Current INR goal:  2.0-3.0   TTR:  76.6% (1 y)   Target end date:  Indefinite   Send INR reminders to:  [facility]    Indications    Atrial fibrillation (H) [I48.91]  Long  term current use of anticoagulant therapy [Z79.01]  Chronic atrial fibrillation (H) [I48.20]             Comments:               Anticoagulation Care Providers       Provider Role Specialty Phone number    Katie Martino MD Referring Family Medicine [phone]    Brianna Matute APRN [facility] Referring Family Medicine [phone]    Myrna Clark MD Referring Family Medicine [phone]

## 2024-06-30 NOTE — PROGRESS NOTES
Medication Therapy Management (MTM) Encounter    ASSESSMENT:                            Medication Adherence/Access: See below for considerations.    Type 2 Diabetes: Patient is not meeting A1c goal of < 8%. Self monitoring of blood glucose is not at goal of fasting  mg/dL and post prandial < 180 mg/dL. Patient is not meeting goal of > 70% time in target with continuous glucose monitoring. Today discussed risks vs benefits of SGLT-2 inhibitor and expressed my concern for future cardiovascular events if diabetes is left uncontrolled. Patient is agreeable to restarting Jardiance. Counseled on rare amputation risk given his PAD comorbidity, however previously tolerated Jardiance well without issues and will monitor closely. Avoiding GLP-1 agonists/DPP-4 inhibitors due to pancreatitis risk and sulfonylurea due to hypoglycemia risk given older age.    PLAN:                            Start Jardiance 10mg daily. Sent Rx to [facility] pharmacy.    Follow-up: Return in 5 weeks (on 8/5/2024) for Medication Therapy Management.    SUBJECTIVE/OBJECTIVE:                          Poli Russo is a 78 year old male seen for a follow-up visit.       Reason for visit: Recheck diabetes. See last A1c result note from PCP.    Allergies/ADRs: Reviewed in chart  Past Medical History: Reviewed in chart  Tobacco: He reports that he quit smoking about 17 years ago. His smoking use included cigarettes. He started smoking about 57 years ago. He has a 40 pack-year smoking history. He has never used smokeless tobacco.  Alcohol: 3-4 beers daily    Medication Adherence/Access: Issues reported - see below.    Type 2 Diabetes:    Lantus 50 units every evening.   Patient reports no current medication side effects.  Previously on Jardiance and tolerated well but stopped due to cost, which he can afford but doesn't want to pay the drug companies/advertisements.  Medication history: Metformin ER caused diarrhea. Actos caused swelling.  Also notable history of recurrent pancreatitis.   Blood sugar monitoring: Continuous Glucose Monitor - Freestyle Berta 3, see report below.  Current diabetes symptoms: none.  Diet/Exercise: Follows with Ruthann RICK - last visit on 2/22/24, next visit on 8/19/24.  Eye/foot exams: up to date        Lab Results   Component Value Date    UMALCR 1,203.94 (H) 11/14/2023     Lab Results   Component Value Date    A1C 8.0 05/20/2024    A1C 10.4 02/15/2024    A1C 9.1 11/14/2023    A1C 8.9 08/15/2023    A1C 8.7 05/11/2023    A1C 7.1 10/03/2020    A1C 6.3 05/12/2020    A1C 6.5 02/04/2020    A1C 9.0 10/17/2019    A1C 9.7 07/18/2019     Today's Vitals: /73   Pulse 76  (Declined weight)  ----------------    I spent 25 minutes with this patient today. All changes were made via collaborative practice agreement with Myrna Clark MD. A copy of the visit note was provided to the patient's provider(s).    A summary of these recommendations was sent via [employer].    Ivette Bell, PharmD, BCACP  Medication Therapy Management Pharmacist  [facility]     Medication Therapy Recommendations  Type 2 diabetes mellitus with diabetic neuropathy, with long-term current use of insulin (H)    Current Medication: insulin glargine (LANTUS PEN) 100 UNIT/ML pen   Rationale: Synergistic therapy - Needs additional medication therapy - Indication   Recommendation: Start Medication - Jardiance 10 MG Tabs - 10mg daily   Status: Accepted per CPA

## 2024-07-01 ENCOUNTER — OFFICE VISIT (OUTPATIENT)
Dept: PHARMACY | Facility: CLINIC | Age: 79
End: 2024-07-01
Payer: COMMERCIAL

## 2024-07-01 VITALS — SYSTOLIC BLOOD PRESSURE: 114 MMHG | DIASTOLIC BLOOD PRESSURE: 73 MMHG | HEART RATE: 76 BPM

## 2024-07-01 DIAGNOSIS — E11.40 TYPE 2 DIABETES MELLITUS WITH DIABETIC NEUROPATHY, WITH LONG-TERM CURRENT USE OF INSULIN (H): Primary | ICD-10-CM

## 2024-07-01 DIAGNOSIS — Z79.4 TYPE 2 DIABETES MELLITUS WITH DIABETIC NEUROPATHY, WITH LONG-TERM CURRENT USE OF INSULIN (H): Primary | ICD-10-CM

## 2024-07-01 PROCEDURE — 99606 MTMS BY PHARM EST 15 MIN: CPT | Performed by: PHARMACIST

## 2024-07-01 PROCEDURE — 99607 MTMS BY PHARM ADDL 15 MIN: CPT | Performed by: PHARMACIST

## 2024-07-01 NOTE — PATIENT INSTRUCTIONS
"Recommendations from today's MTM visit:                                                    MTM (medication therapy management) is a service provided by a clinical pharmacist designed to help you get the most of out of your medicines.   Today we reviewed what your medicines are for, how to know if they are working, that your medicines are safe and how to make your medicine regimen as easy as possible.      Start Jardiance 10mg daily. Sent Rx to [facility] pharmacy.    Follow-up: Return in 5 weeks (on 8/5/2024) for Medication Therapy Management.    It was great speaking with you today.  I value your experience and would be very thankful for your time in providing feedback in our clinic survey. In the next few days, you may receive an email or text message from [employer] with a link to a survey related to your  clinical pharmacist.\"     To schedule another MTM appointment, please call the clinic directly or you may call the MTM scheduling line at [phone] or toll-free at [phone].     My Clinical Pharmacist's contact information:                                                      Please feel free to contact me with any questions or concerns you have.      Ivette Bell, PharmD, BCACP  Medication Therapy Management Pharmacist  Voicemail: [phone] (Mon/Wed only)     "

## 2024-07-05 DIAGNOSIS — I73.9 PVD (PERIPHERAL VASCULAR DISEASE) (H): ICD-10-CM

## 2024-07-06 RX ORDER — ATORVASTATIN CALCIUM 80 MG/1
80 TABLET, FILM COATED ORAL DAILY
Qty: 90 TABLET | Refills: 2 | Status: SHIPPED | OUTPATIENT
Start: 2024-07-06

## 2024-07-09 DIAGNOSIS — I48.20 CHRONIC ATRIAL FIBRILLATION (H): ICD-10-CM

## 2024-07-09 RX ORDER — WARFARIN SODIUM 2.5 MG/1
TABLET ORAL
Qty: 70 TABLET | Refills: 1 | Status: SHIPPED | OUTPATIENT
Start: 2024-07-09

## 2024-07-09 NOTE — TELEPHONE ENCOUNTER
ANTICOAGULATION MANAGEMENT:  Medication Refill    Anticoagulation Summary  As of 6/17/2024      Warfarin maintenance plan:  1.25 mg (2.5 mg x 0.5) every Sun, Tue, Thu; 2.5 mg (2.5 mg x 1) all other days   Next INR check:  7/22/2024   Target end date:  Indefinite    Indications    Atrial fibrillation (H) [I48.91]  Long term current use of anticoagulant therapy [Z79.01]  Chronic atrial fibrillation (H) [I48.20]                 Anticoagulation Care Providers       Provider Role Specialty Phone number    Katie Martino MD Referring Family Medicine [phone]    Biranna Matute APRN CNP Referring Family Medicine [phone]    Myrna Clark MD Referring [facility] Medicine [phone]            Refill Criteria    Visit with referring provider/group: Meets criteria: office visit within referring provider group in the last 1 year on 5/20/24    [facility] referral last signed: 03/13/2024; within last year: Yes    Lab monitoring not exceeding 2 weeks overdue: Yes    Antoine meets all criteria for refill. Rx instructions and quantity supplied updated to match patient's current dosing plan. Warfarin 90 day supply with 1 refill granted per [facility] protocol     Luisa Serrano RN  Anticoagulation Clinic

## 2024-07-09 NOTE — TELEPHONE ENCOUNTER
Pending Prescriptions:                       Disp   Refills    warfarin ANTICOAGULANT (COUMADIN) 2.5 MG *64 tab*1            Sig: Take 2.5 mg every Mon, Wed, Fri; 1.25 mg all           other days or As directed by Anticoagulation           clinic      Aug 05, 2024 8:30 AM  Pharmacist Visit with Ivette Bell RPH  [facility] ([facility] ) [address] [place] [id number] [phone]     Aug 19, 2024 7:00 AM  (Arrive by 6:45 AM)  Provider Visit with Myrna Clark MD  [facility] ([facility] ) [address] [place] [phone]

## 2024-07-15 ENCOUNTER — HOSPITAL ENCOUNTER (OUTPATIENT)
Dept: ULTRASOUND IMAGING | Facility: CLINIC | Age: 79
Discharge: HOME | End: 2024-07-15
Attending: SURGERY | Admitting: SURGERY
Payer: COMMERCIAL

## 2024-07-15 DIAGNOSIS — I73.9 PAD (PERIPHERAL ARTERY DISEASE) (H): ICD-10-CM

## 2024-07-15 PROCEDURE — 93924 LWR XTR VASC STDY BILAT: CPT

## 2024-07-16 ENCOUNTER — OFFICE VISIT (OUTPATIENT)
Dept: VASCULAR SURGERY | Facility: CLINIC | Age: 79
End: 2024-07-16
Attending: SURGERY
Payer: COMMERCIAL

## 2024-07-16 VITALS
BODY MASS INDEX: 31.4 KG/M2 | HEART RATE: 86 BPM | HEIGHT: 69 IN | WEIGHT: 212 LBS | OXYGEN SATURATION: 92 % | DIASTOLIC BLOOD PRESSURE: 73 MMHG | SYSTOLIC BLOOD PRESSURE: 126 MMHG

## 2024-07-16 DIAGNOSIS — I73.9 PAD (PERIPHERAL ARTERY DISEASE) (H): Primary | ICD-10-CM

## 2024-07-16 PROCEDURE — 99214 OFFICE O/P EST MOD 30 MIN: CPT | Performed by: PHYSICIAN ASSISTANT

## 2024-07-16 NOTE — NURSING NOTE
"No chief complaint on file.      Vitals:    07/16/24 0949   Pulse: 86   SpO2: 92%   Weight: 96.2 kg (212 lb)   Height: 1.753 m (5' 9\")     Wt Readings from Last 1 Encounters:   07/16/24 96.2 kg (212 lb)     Adeline Gutierrez MA    "

## 2024-07-16 NOTE — PATIENT INSTRUCTIONS
This is the plan that was discussed at your appointment.    Please see instructions below on how to exercise at home to help decrease the pain in your legs.    2. We will contact you to follow up in 6 months with repeat imaging    I am including additional information on these things and our contact information if you have any questions or concerns.   Please do not hesitate to reach out to us if you felt we did not answer your questions or you are unsure of the treatment plan after your visit today. Our number is [phone].  Thank you for trusting us with your care.         Again thank you for your time.        Walking farther without pain  Exercise strengthens leg muscles that are out of shape. It also trains these muscles to work with less oxygen. This helps your leg muscles work better even with reduced blood flow to your legs. When you have PAD, a walking program can be helpful. Your program can:  ?Help you walk longer and farther without claudication. This is an ache in your legs during exercise that goes away with rest.  ?Let you do more and be more active  ?Add to your overall health and well-being  ?Help you control your blood sugar and blood pressure  ?Help you become healthier with no risk and at little or no cost  Getting started  Your local hospital, vascular center, or cardiac rehab center may have a special walking program for people with PAD. If so, this is your best option. But if you can t find a program, or it s not covered by insurance, you can still walk on your own. Follow these steps at each session:  ?Step 1. Start at a pace that lets you walk for 5 to 10 minutes before you start to feel claudication. This feeling is unpleasant, but it doesn t hurt you. Keep going until the pain makes you feel that you need to stop.  ?Step 2. Stop and rest for 3 to 5 minutes, just long enough for the pain to go away. You can rest standing or sitting. (Some people like to bring along a cane or a  lightweight folding chair.)  ?Step 3. Again, walk at a pace that lets you walk for 5 to 10 minutes more before you feel pain. This may be slower than your starting pace in step 1. Then rest again.  ?Step 4. Repeat this process until you ve walked for 45 minutes. This should be about 60 to 80 minutes total, including resting time. You may not be able to do a full 45 minutes at first. Do as much as you can, and increase your time as you improve.  Making the most of your program  ?Walk at least 3 times a week. Take no more than 2 days off between sessions. If you stop walking, even for a week or two, you can lose the health benefits of your program.  ?Find a good place to walk. A treadmill or a track may be better at first. That way, you won t run the risk of going too far and finding that you can t walk back. Be sure to have a place to walk indoors in bad weather, such as a gym or a mall.  ?Wear shoes with sturdy, flexible soles. The heel should fit without slipping. You should have room to wiggle your toes.  ?Keep track of how long you walk. A pedometer will show your daily progress. It can also show how much farther you can walk as time goes by.  ?Ask a friend to keep you company. You may enjoy walking with someone else. Or you may want to make your walking sessions a time to relax by yourself.  ?Make it fun. Listen to music while you walk and rest. Walk in a favorite park. Get to know the people at the gym, or the folks that you pass on your route. While you rest, you can window-shop, read, or feed the birds. Do whatever will help you enjoy your exercise sessions.

## 2024-07-16 NOTE — PROGRESS NOTES
VASCULAR SURGERY PROGRESS NOTE    LOCATION:  [facility]     Antoine Russo  Medical Record #: [id number]  YOB: 1945  Age: 78 year old     Date of Service: 7/16/2024    PRIMARY CARE PROVIDER: Myrna Clark    Reason for visit: Surveillance of PAD    IMPRESSION: 78-year-old male presenting for surveillance of peripheral arterial disease. ABIs today remain stable at 0.71 on the right with mild exercised induced ischemia and 0.88 on the left with no exercise-induced ischemia. Patient has been experiencing none lifestyle limiting claudication.  Denies any rest pain or nonhealing wounds. Medically optimized.     RECOMMENDATION/RISKS: Continue best medical management with warfarin and statin. Encouraged regular activity/ambulation and provided instructions for increasing his walking at home.  Follow-up in 6 months with repeat ERIN studies.    HPI:  Antoine Russo is a 78 year old male with past medical history significant for hypertension, hyperlipidemia, type 2 diabetes mellitus, coronary artery disease, atrial fibrillation, chronic kidney disease stage II, low back pain, and peripheral arterial disease with lifestyle limiting claudication.  Patient underwent left lower extremity angiogram with treatment of SFA disease in September 2022.  He was last seen by the vascular team 1 year ago and was noted to have continued improvement in his lower extremity symptoms and unremarkable imaging studies.    Today,  presents for follow-up.  He experiences mild leg pain with ambulation.  Continues to experience neuropathy in the feet, otherwise denies any rest pain or nonhealing wounds.  He is compliant with his medications.     Imaging results were discussed and all questions answered.  No other concerns.    REVIEW OF SYSTEMS:    A 12 point ROS was reviewed and is negative except for what is listed above in HPI.    PHH:    Past Medical History:   Diagnosis Date    Acute  gastritis 10/3/2020    Acute kidney injury (H24) 04/17/2019    Acute on chronic pancreatitis (H) 01/23/2018    Acute pancreatitis 10/21/2018    Acute recurrent pancreatitis 10/17/2020    Acute right-sided low back pain with left-sided sciatica 2/4/2020    Bacteriuria with pyuria 04/17/2019    C. difficile colitis     Chronic atrial fibrillation (H)     On chronic anticoagulation with coumadin    Colon polyp 08/26/2011    Colonoscopy 8/2011-A sessile polyp was found in the cecum. The polyp was 6 mm in size. The polyp was removed with a hot snare. Resection and retrieval were complete. A sessile polyp was found in the proximal transverse colon. The polyp was 15 mm in size. The polyp was removed with a hot snare. Resection and retrieval were complete. A sessile polyp was found in the sigmoid colon. The polyp was 5 mm    Dehydration 2/16/2021    Diabetes mellitus (H)     type 2    Groin fluid collection 12/15/2012    CT 12/12- New (since 5/11) collection measuring at least 2.3 cm in diameter and 6.5 cm in length within the right inguinal canal. 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, N0, M0 carcinoma, underwent transcervical glossectomy, floor of mouth excision, BL neck dissection, adj radiation, and skin flap reconstruction    Pancreatic duct leak 5/3/2016    Recurrent pancreatitis     alcoholic pancreatitis      Past Surgical History:   Procedure Laterality Date    BREAST SURGERY  01/01/2008    right breast mass benign    COLECTOMY SUBTOTAL  2013    With diverting ostomy creation; done for toxic C difficile colitis    COLONOSCOPY      multiple polyps removed    COLONOSCOPY  08/24/2011    TUMOR/POLYP/LESION BY SNARE    COLONOSCOPY  12/17/2012    COLONOSCOPY    COLONOSCOPY  12/18/2012    COLONOSCOPY    DENERVATION OF SPERMATIC CORD MICROSURGICAL Left 05/23/2017    Procedure: DENERVATION OF SPERMATIC CORD MICROSURGICAL;;   Surgeon: Marcio Aggarwal MD;  [facility]    DISSECTION RADICAL NECK BILATERAL  08/02/2012    Procedure: DISSECTION RADICAL NECK BILATERAL;;  Surgeon: Yung Alvares MD;  [facility]    ENDOSCOPIC RETROGRADE CHOLANGIOPANCREATOGRAM N/A 05/10/2016    Procedure: COMBINED ENDOSCOPIC RETROGRADE CHOLANGIOPANCREATOGRAPHY, PLACE TUBE/STENT;  Surgeon: Yovanny Beasley MD;  [facility]    ENDOSCOPIC RETROGRADE CHOLANGIOPANCREATOGRAM N/A 03/29/2018    Procedure: ENDOSCOPIC RETROGRADE CHOLANGIOPANCREATOGRAM;  Endoscopic Retrograde Cholangiopancreatogram, Endoscopic Ultrasound, Biliary Sphincterotomy, Biliary and Pancreatic Stent Placement;  Surgeon: Yovanny Beasley MD;  [facility]    ENDOSCOPIC ULTRASOUND UPPER GASTROINTESTINAL TRACT (GI) N/A 02/03/2016    Procedure: ENDOSCOPIC ULTRASOUND, ESOPHAGOSCOPY / UPPER GASTROINTESTINAL TRACT (GI);  Surgeon: Grabiel Plata MD;  [facility]    ENDOSCOPIC ULTRASOUND UPPER GASTROINTESTINAL TRACT (GI) N/A 03/29/2018    Procedure: ENDOSCOPIC ULTRASOUND, ESOPHAGOSCOPY / UPPER GASTROINTESTINAL TRACT (GI);;  Surgeon: Grabiel Plata MD;  [facility]    ESOPHAGOSCOPY, GASTROSCOPY, DUODENOSCOPY (EGD), COMBINED N/A 02/03/2016    Procedure: COMBINED ENDOSCOPIC ULTRASOUND, ESOPHAGOSCOPY, GASTROSCOPY, DUODENOSCOPY (EGD), FINE NEEDLE ASPIRATE/BIOPSY;  Surgeon: Grabiel Plata MD;  [facility]    ESOPHAGOSCOPY, GASTROSCOPY, DUODENOSCOPY (EGD), COMBINED N/A 06/08/2016    Procedure: COMBINED ESOPHAGOSCOPY, GASTROSCOPY, DUODENOSCOPY (EGD), REMOVE FOREIGN BODY;  Surgeon: Yovanny Beasley MD;  [facility]    ESOPHAGOSCOPY, GASTROSCOPY, DUODENOSCOPY (EGD), COMBINED N/A 04/17/2018    Procedure: COMBINED ESOPHAGOSCOPY, GASTROSCOPY, DUODENOSCOPY (EGD), REMOVE FOREIGN BODY;  EGD with stent removal;  Surgeon: Grabiel Plata MD;  [facility]    ESOPHAGOSCOPY, GASTROSCOPY, DUODENOSCOPY (EGD), COMBINED N/A 10/26/2022     Procedure: ESOPHAGOGASTRODUODENOSCOPY, WITH BIOPSY;  Surgeon: Jonatan Celeste MD;  Location: [facility]    EXCISE LESION INTRAORAL  06/14/2012    Procedure: EXCISE LESION INTRAORAL;  Wide Local Excision Floor of Mouth, Direct Laryngoscopy, Bilateral [place]'s Marsuplization, Split Thickness Skin Graft from right Thigh  Latex Safe;  Surgeon: Gerson Ravi MD;  [facility]    EXCISE LESION INTRAORAL  08/02/2012    Procedure: EXCISE LESION INTRAORAL;  Floor of Mouth Resection, Bilateral Selective Radical Neck Dissection, Tracheostomy, Left Radial Forearm  Free Flap with Alloderm, Nasogastric Feeding Tube Placement,    * Latex Safe*;  Surgeon: Gerson Ravi MD;  [facility]    EXCISE LESION INTRAORAL  12/11/2012    Procedure: EXCISE LESION INTRAORAL;  takedown of oral flap;  Surgeon: Yung Alvares MD;  [facility]    GRAFT FREE VASCULARIZED (LOCATION)  08/02/2012    Procedure: GRAFT FREE VASCULARIZED (LOCATION);;  Surgeon: Yung Alvares MD;  [facility]    GRAFT SKIN SPLIT THICKNESS FROM EXTREMITY  06/14/2012    Procedure: GRAFT SKIN SPLIT THICKNESS FROM EXTREMITY;;  Surgeon: Gerson Ravi MD;  [facility]    IR LOWER EXTREMITY ANGIOGRAM LEFT  9/30/2022    LAPAROSCOPIC ILEOSTOMY TAKEDOWN  06/06/2013    LAPAROSCOPIC ILEOSTOMY TAKEDOWN    LAPAROTOMY EXPLORATORY  12/20/2012    LAPAROTOMY EXPLORATORY with Colectomy    LARYNGOSCOPY  06/14/2012    Procedure: LARYNGOSCOPY;;  Surgeon: Gerson Ravi MD;  [facility]    ORTHOPEDIC SURGERY      ganglian cyst left ankle    PANCREATECTOMY, SPLENECTOMY N/A 03/10/2016    Procedure: PANCREATECTOMY, SPLENECTOMY;  Surgeon: Nael Abel MD;  [facility]    SHOULDER SURGERY  2006, 2008    2006- right rotator cuff, 2008 bone spur on left. Dr. Hdez    VARICOCELECTOMY Left 05/23/2017    Procedure: VARICOCELECTOMY;  Left Varicocele Repair, Denervation of Left Testis;  Surgeon: Marcio Aggarwal MD;  [facility]     ALLERGIES:  Actos [pioglitazone]  and Metformin    MEDS:    Current Outpatient Medications:     atorvastatin (LIPITOR) 80 MG tablet, Take 1 tablet by mouth once daily, Disp: 90 tablet, Rfl: 2    Continuous Blood Gluc Sensor (FREESTYLE LISA 3 SENSOR) MISC, 1 each every 14 days, Disp: 2 each, Rfl: 5    empagliflozin (JARDIANCE) 10 MG TABS tablet, Take 1 tablet (10 mg) by mouth daily, Disp: 30 tablet, Rfl: 1    insulin glargine (LANTUS PEN) 100 UNIT/ML pen, Inject 50 Units Subcutaneous at bedtime Increase by 2 units every 3 days until blood sugars in am under 120 up to max 60 units daily, Disp: 60 mL, Rfl: 3    insulin pen needle (BD LUIS U/F) 32G X 4 MM miscellaneous, USE PEN NEEDLE ONCE DAILY AS DIRECTED, Disp: 60 each, Rfl: 0    lisinopril (ZESTRIL) 20 MG tablet, Take 1 tablet (20 mg) by mouth daily, Disp: 90 tablet, Rfl: 1    metoprolol succinate ER (TOPROL XL) 50 MG 24 hr tablet, TAKE 1 & 1/2 (ONE & ONE-HALF) TABLETS BY MOUTH TWICE DAILY, Disp: 270 tablet, Rfl: 2    multivitamin, therapeutic with minerals (THERA-VIT-M) TABS, Take 1 tablet by mouth daily., Disp: 30 each, Rfl: 1    pregabalin (LYRICA) 150 MG capsule, Take 1 capsule by mouth twice daily, Disp: 180 capsule, Rfl: 3    tamsulosin (FLOMAX) 0.4 MG capsule, Take 1 capsule by mouth once daily, Disp: 90 capsule, Rfl: 2    vitamin B-12 (CYANOCOBALAMIN) 100 MCG tablet, Take 100 mcg by mouth daily, Disp: , Rfl:     Vitamin D3 (CHOLECALCIFEROL) 25 mcg (1000 units) tablet, Take 1 tablet by mouth daily, Disp: , Rfl:     warfarin ANTICOAGULANT (COUMADIN) 2.5 MG tablet, Take 0.5 tab on Sun/Tues/Thurs and 1 tablet all other days,  or As directed by Anticoagulation clinic, Disp: 70 tablet, Rfl: 1    furosemide (LASIX) 20 MG tablet, Take 1 tablet (20 mg) by mouth daily as needed (for leg swelling) (Patient not taking: Reported on 7/16/2024), Disp: 30 tablet, Rfl: 0    SOCIAL HABITS:    History   Smoking Status    Former    Packs/day: 1.00    Years: 40.00    Types: Cigarettes    Quit date: 11/24/2006  "  Smokeless Tobacco    Never     Social History    Substance and Sexual Activity      Alcohol use: Yes      History   Drug Use Unknown     FAMILY HISTORY:    Family History   Problem Relation Age of Onset    Diabetes Sister         onset age 50    Alzheimer Disease Mother          80    Alzheimer Disease Father          85    Diabetes Other         nephew type 1    Diabetes Other     Aneurysm Sister     Anesthesia Reaction No family hx of     Colon Cancer No family hx of     Colon Polyps No family hx of     Crohn's Disease No family hx of     Ulcerative Colitis No family hx of      PE:  /73   Pulse 86   Ht 1.753 m (5' 9\")   Wt 96.2 kg (212 lb)   SpO2 92%   BMI 31.31 kg/m    Wt Readings from Last 1 Encounters:   24 96.2 kg (212 lb)     Body mass index is 31.31 kg/m .    EXAM:  GENERAL: well-developed 78 year old male who appears his stated age  CARDIAC: normal   CHEST/LUNG: normal respiratory effort   MUSCULOSKELETAL: grossly normal and both lower extremities are intact, no lower extremity edema  NEUROLOGIC: focally intact, alert and oriented x 3  PSYCH: appropriate affect    DIAGNOSTIC STUDIES:     Images:    US ERIN with PPG with Exercise    Result Date: 7/15/2024  INDICATION: Decreased lower extremity pulses, lower extremity pain     IMPRESSION:   1. RIGHT LOWER EXTREMITY: Resting ankle-brachial index of 0.71 reflecting moderate peripheral arterial disease. There is mild exercise-induced ischemia (0.60).   2. LEFT LOWER EXTREMITY: Resting ankle-brachial index of 0.88 reflecting borderline peripheral arterial disease. No exercise-induced ischemia (0.95).     LABS:      Sodium   Date Value Ref Range Status   2024 139 135 - 145 mmol/L Final     Comment:     Reference intervals for this test were updated on 2023 to more accurately reflect our healthy population. There may be differences in the flagging of prior results with similar values performed with this method. Interpretation " of those prior results can be made in the context of the updated reference intervals.    02/15/2024 139 135 - 145 mmol/L Final     Comment:     Reference intervals for this test were updated on 09/26/2023 to more accurately reflect our healthy population. There may be differences in the flagging of prior results with similar values performed with this method. Interpretation of those prior results can be made in the context of the updated reference intervals.    08/15/2023 138 136 - 145 mmol/L Final   02/22/2021 136 133 - 144 mmol/L Final   02/18/2021 138 133 - 144 mmol/L Final   02/17/2021 137 133 - 144 mmol/L Final     Urea Nitrogen   Date Value Ref Range Status   05/20/2024 14.2 8.0 - 23.0 mg/dL Final   02/15/2024 17.0 8.0 - 23.0 mg/dL Final   08/15/2023 15.1 8.0 - 23.0 mg/dL Final   07/19/2022 16 7 - 30 mg/dL Final   05/24/2022 14 7 - 30 mg/dL Final   04/15/2022 17 7 - 30 mg/dL Final   02/22/2021 13 7 - 30 mg/dL Final   02/18/2021 12 7 - 30 mg/dL Final   02/17/2021 13 7 - 30 mg/dL Final     Hemoglobin   Date Value Ref Range Status   05/20/2024 15.4 13.3 - 17.7 g/dL Final   08/15/2023 14.9 13.3 - 17.7 g/dL Final   07/13/2023 15.5 13.3 - 17.7 g/dL Final   02/22/2021 14.0 13.3 - 17.7 g/dL Final   02/18/2021 13.8 13.3 - 17.7 g/dL Final   02/17/2021 13.9 13.3 - 17.7 g/dL Final     Platelet Count   Date Value Ref Range Status   05/20/2024 142 (L) 150 - 450 10e3/uL Final   08/15/2023 157 150 - 450 10e3/uL Final   07/13/2023 143 (L) 150 - 450 10e3/uL Final   02/22/2021 246 150 - 450 10e9/L Final   02/18/2021 193 150 - 450 10e9/L Final   02/17/2021 192 150 - 450 10e9/L Final     INR   Date Value Ref Range Status   06/17/2024 2.3 (H) 0.9 - 1.1 Final   05/20/2024 2.7 (H) 0.9 - 1.1 Final   05/03/2024 2.2 (H) 0.9 - 1.1 Final   07/13/2023 2.16 (H) 0.85 - 1.15 Final   05/11/2023 2.06 (H) 0.85 - 1.15 Final   10/31/2022 1.30 (H) 0.85 - 1.15 Final   06/09/2021 2.50 (H) 0.86 - 1.14 Final     Comment:     This test is intended for  monitoring Coumadin therapy.  Results are not   accurate in patients with prolonged INR due to factor deficiency.     05/05/2021 2.70 (H) 0.86 - 1.14 Final     Comment:     This test is intended for monitoring Coumadin therapy.  Results are not   accurate in patients with prolonged INR due to factor deficiency.     04/07/2021 2.70 (H) 0.86 - 1.14 Final     Comment:     This test is intended for monitoring Coumadin therapy.  Results are not   accurate in patients with prolonged INR due to factor deficiency.       30 minutes spent on the day of encounter doing chart review, history and exam, documentation, and further activities as noted.     Domonique Carrizales PA-C  VASCULAR SURGERY

## 2024-07-21 DIAGNOSIS — N40.0 HYPERTROPHY OF PROSTATE WITHOUT URINARY OBSTRUCTION: ICD-10-CM

## 2024-07-21 DIAGNOSIS — R39.15 URGENCY OF URINATION: ICD-10-CM

## 2024-07-22 ENCOUNTER — ANTICOAGULATION THERAPY VISIT (OUTPATIENT)
Dept: ANTICOAGULATION | Facility: CLINIC | Age: 79
End: 2024-07-22

## 2024-07-22 ENCOUNTER — LAB (OUTPATIENT)
Dept: LAB | Facility: CLINIC | Age: 79
End: 2024-07-22
Payer: COMMERCIAL

## 2024-07-22 DIAGNOSIS — I48.20 CHRONIC ATRIAL FIBRILLATION (H): ICD-10-CM

## 2024-07-22 DIAGNOSIS — Z79.01 LONG TERM CURRENT USE OF ANTICOAGULANT THERAPY: ICD-10-CM

## 2024-07-22 DIAGNOSIS — Z79.01 LONG TERM CURRENT USE OF ANTICOAGULANT THERAPY: Chronic | ICD-10-CM

## 2024-07-22 DIAGNOSIS — I48.20 CHRONIC ATRIAL FIBRILLATION (H): Primary | Chronic | ICD-10-CM

## 2024-07-22 LAB — INR BLD: 3.6 (ref 0.9–1.1)

## 2024-07-22 PROCEDURE — 36416 COLLJ CAPILLARY BLOOD SPEC: CPT

## 2024-07-22 PROCEDURE — 85610 PROTHROMBIN TIME: CPT

## 2024-07-22 RX ORDER — TAMSULOSIN HYDROCHLORIDE 0.4 MG/1
CAPSULE ORAL
Qty: 90 CAPSULE | Refills: 2 | Status: SHIPPED | OUTPATIENT
Start: 2024-07-22

## 2024-07-22 NOTE — PROGRESS NOTES
ANTICOAGULATION MANAGEMENT     Antoine Russo 78 year old male is on warfarin with supratherapeutic INR result. (Goal INR 2.0-3.0)    Recent labs: (last 7 days)     07/22/24  0650   INR 3.6*       ASSESSMENT     Warfarin Lab Questionnaire    Warfarin Doses Last 7 Days    Pt Rptd Dose SUNDAY MONDAY TUESDAY WED THURS FRIDAY SATURDAY 7/21/2024   4:12 PM 1.25 2.5 1.25 2.5 1.25 2.5 2.5         7/21/2024   Warfarin Lab Questionnaire   Missed doses within past 14 days? No   Changes in diet or alcohol within past 14 days? No   Medication changes since last result? No   Injuries or illness since last result? No   New shortness of breath, severe headaches or sudden changes in vision since last result? No   Abnormal bleeding since last result? No   Upcoming surgery, procedure? No      Previous result: Therapeutic last 2(+) visits  Additional findings:  increase in walking      PLAN     Recommended plan for ongoing change(s) affecting INR     Dosing Instructions: pt already took dose today - tomorrow hold dose then decrease your warfarin dose (9.1% change) with next INR in 2 weeks       Summary  As of 7/22/2024      Full warfarin instructions:  7/23: Hold; Otherwise 2.5 mg every Mon, Wed, Fri; 1.25 mg all other days   Next INR check:  8/5/2024               Telephone call with Poli or Saleem who verbalizes understanding and agrees to plan    Lab visit scheduled    Education provided: Please call back if any changes to your diet, medications or how you've been taking warfarin  Symptom monitoring: monitoring for bleeding signs and symptoms and when to seek medical attention/emergency care    Plan made per [facility] anticoagulation protocol    Madhavi Sanders RN  Anticoagulation Clinic  7/22/2024    _______________________________________________________________________     Anticoagulation Episode Summary       Current INR goal:  2.0-3.0   TTR:  72.2% (1 y)   Target end date:  Indefinite   Send INR reminders to:  [facility]    Indications    Atrial fibrillation (H) [I48.91]  Long term current use of anticoagulant therapy [Z79.01]  Chronic atrial fibrillation (H) [I48.20]             Comments:               Anticoagulation Care Providers       Provider Role Specialty Phone number    Katie Martino MD Referring Family Medicine [phone]    Brianna Matute APRN CNP Referring Family Medicine [phone]    Myrna Clark MD Referring [facility] Medicine [phone]

## 2024-07-25 DIAGNOSIS — Z79.4 TYPE 2 DIABETES MELLITUS WITH DIABETIC NEUROPATHY, WITH LONG-TERM CURRENT USE OF INSULIN (H): ICD-10-CM

## 2024-07-25 DIAGNOSIS — E11.40 TYPE 2 DIABETES MELLITUS WITH DIABETIC NEUROPATHY, WITH LONG-TERM CURRENT USE OF INSULIN (H): ICD-10-CM

## 2024-07-25 RX ORDER — BLOOD-GLUCOSE SENSOR
EACH MISCELLANEOUS
Qty: 6 EACH | Refills: 3 | Status: SHIPPED | OUTPATIENT
Start: 2024-07-25

## 2024-07-25 NOTE — TELEPHONE ENCOUNTER
Pending Prescriptions:                       Disp   Refills    Continuous Glucose Sensor (FREESTYLE LIBR*       0            Sig: USE AS DIRECTED CHANGE  EVERY  14  DAYS    Routing refill request to provider for review/approval because:  Drug not on the [facility] refill protocol       Gilbert Donnelly RN

## 2024-08-05 ENCOUNTER — OFFICE VISIT (OUTPATIENT)
Dept: PHARMACY | Facility: CLINIC | Age: 79
End: 2024-08-05
Payer: COMMERCIAL

## 2024-08-05 ENCOUNTER — ANTICOAGULATION THERAPY VISIT (OUTPATIENT)
Dept: ANTICOAGULATION | Facility: CLINIC | Age: 79
End: 2024-08-05

## 2024-08-05 ENCOUNTER — LAB (OUTPATIENT)
Dept: LAB | Facility: CLINIC | Age: 79
End: 2024-08-05
Payer: COMMERCIAL

## 2024-08-05 DIAGNOSIS — I25.10 CAD (CORONARY ARTERY DISEASE): ICD-10-CM

## 2024-08-05 DIAGNOSIS — I48.20 CHRONIC ATRIAL FIBRILLATION (H): ICD-10-CM

## 2024-08-05 DIAGNOSIS — E11.40 TYPE 2 DIABETES MELLITUS WITH DIABETIC NEUROPATHY, WITH LONG-TERM CURRENT USE OF INSULIN (H): Primary | ICD-10-CM

## 2024-08-05 DIAGNOSIS — I10 HYPERTENSION, BENIGN ESSENTIAL, GOAL BELOW 140/90: Chronic | ICD-10-CM

## 2024-08-05 DIAGNOSIS — I48.91 ATRIAL FIBRILLATION (H): Primary | Chronic | ICD-10-CM

## 2024-08-05 DIAGNOSIS — Z79.01 LONG TERM CURRENT USE OF ANTICOAGULANT THERAPY: Chronic | ICD-10-CM

## 2024-08-05 DIAGNOSIS — Z79.01 LONG TERM CURRENT USE OF ANTICOAGULANT THERAPY: ICD-10-CM

## 2024-08-05 DIAGNOSIS — Z79.4 TYPE 2 DIABETES MELLITUS WITH DIABETIC NEUROPATHY, WITH LONG-TERM CURRENT USE OF INSULIN (H): Primary | ICD-10-CM

## 2024-08-05 LAB
ANION GAP SERPL CALCULATED.3IONS-SCNC: 8 MMOL/L (ref 7–15)
BUN SERPL-MCNC: 14.6 MG/DL (ref 8–23)
CALCIUM SERPL-MCNC: 9.8 MG/DL (ref 8.8–10.4)
CHLORIDE SERPL-SCNC: 102 MMOL/L (ref 98–107)
CHOLEST SERPL-MCNC: 119 MG/DL
CREAT SERPL-MCNC: 0.98 MG/DL (ref 0.67–1.17)
EGFRCR SERPLBLD CKD-EPI 2021: 80 ML/MIN/1.73M2
GLUCOSE SERPL-MCNC: 204 MG/DL (ref 70–99)
HCO3 SERPL-SCNC: 29 MMOL/L (ref 22–29)
HDLC SERPL-MCNC: 35 MG/DL
INR BLD: 2.5 (ref 0.9–1.1)
LDLC SERPL CALC-MCNC: 59 MG/DL
NONHDLC SERPL-MCNC: 84 MG/DL
POTASSIUM SERPL-SCNC: 5.1 MMOL/L (ref 3.4–5.3)
SODIUM SERPL-SCNC: 139 MMOL/L (ref 135–145)
TRIGL SERPL-MCNC: 127 MG/DL

## 2024-08-05 PROCEDURE — 80048 BASIC METABOLIC PNL TOTAL CA: CPT

## 2024-08-05 PROCEDURE — 36416 COLLJ CAPILLARY BLOOD SPEC: CPT

## 2024-08-05 PROCEDURE — 36415 COLL VENOUS BLD VENIPUNCTURE: CPT

## 2024-08-05 PROCEDURE — 80061 LIPID PANEL: CPT

## 2024-08-05 PROCEDURE — 85610 PROTHROMBIN TIME: CPT

## 2024-08-05 PROCEDURE — 99606 MTMS BY PHARM EST 15 MIN: CPT | Performed by: PHARMACIST

## 2024-08-05 NOTE — PROGRESS NOTES
ANTICOAGULATION MANAGEMENT     Antoine Russo 78 year old male is on warfarin with therapeutic INR result. (Goal INR 2.0-3.0)    Recent labs: (last 7 days)     08/05/24  0821   INR 2.5*       ASSESSMENT     Warfarin Lab Questionnaire    Warfarin Doses Last 7 Days      8/4/2024     8:31 AM   Dose in Tablet or Mg   TAB or MG? milligram (mg)     Pt Rptd Dose SUNDAY MONDAY TUESDAY WED THURS FRIDAY SATURDAY 8/4/2024   8:31 AM 1.25 2.5 1.25 2.5 1.25 2.5 1.25         8/4/2024   Warfarin Lab Questionnaire   Missed doses within past 14 days? No   Changes in diet or alcohol within past 14 days? No   Medication changes since last result? No   Injuries or illness since last result? No   New shortness of breath, severe headaches or sudden changes in vision since last result? No   Abnormal bleeding since last result? No   Upcoming surgery, procedure? No   Best number to call with results? [phone]        Previous result: Supratherapeutic  Additional findings: None       PLAN     Recommended plan for no diet, medication or health factor changes affecting INR     Dosing Instructions: Continue your current warfarin dose with next INR in 2 weeks       Summary  As of 8/5/2024      Full warfarin instructions:  2.5 mg every Mon, Wed, Fri; 1.25 mg all other days   Next INR check:  8/19/2024               Telephone call with Poli or Saleem who verbalizes understanding and agrees to plan    Lab visit scheduled    Education provided: Please call back if any changes to your diet, medications or how you've been taking warfarin  Contact [phone] with any changes, questions or concerns.     Plan made per [facility] anticoagulation protocol    Aparna Kunz, RN  Anticoagulation Clinic  8/5/2024    _______________________________________________________________________     Anticoagulation Episode Summary       Current INR goal:  2.0-3.0   TTR:  73.7% (1 y)   Target end date:  Indefinite   Send INR reminders to:  [facility]     Indications    Atrial fibrillation (H) [I48.91]  Long term current use of anticoagulant therapy [Z79.01]  Chronic atrial fibrillation (H) [I48.20]             Comments:               Anticoagulation Care Providers       Provider Role Specialty Phone number    Katie Martino MD Referring Family Medicine [phone]    Brianna Matute APRN CNP Referring Family Medicine [phone]    Myrna Clark MD Referring [facility] Medicine [phone]

## 2024-08-05 NOTE — PATIENT INSTRUCTIONS
"Recommendations from today's MTM visit:                                                    MTM (medication therapy management) is a service provided by a clinical pharmacist designed to help you get the most of out of your medicines.   Today we reviewed what your medicines are for, how to know if they are working, that your medicines are safe and how to make your medicine regimen as easy as possible.      Continue current diabetes medications - Jardiance 10mg daily and Lantus 50 units daily.  Recheck basic metabolic panel today. Will notify results via [facility] [employer] â€“ [employer].  If having issues with low blood sugars <70 mg/dL, then should back off Lantus by 2 units and let Ivette know via [facility] [employer].    Follow-up: Return in 2 weeks (on 8/19/2024) for Primary Care Provider Visit, Diabetes Education.    It was great speaking with you today.  I value your experience and would be very thankful for your time in providing feedback in our clinic survey. In the next few days, you may receive an email or text message from [employer] with a link to a survey related to your  clinical pharmacist.\"     To schedule another MTM appointment, please call the clinic directly or you may call the MTM scheduling line at [phone] or toll-free at [phone].     My Clinical Pharmacist's contact information:                                                      Please feel free to contact me with any questions or concerns you have.      Ivette Bell, PharmD, BCACP  Medication Therapy Management Pharmacist  Voicemail: [phone] (Mon/Wed only)     "

## 2024-08-12 DIAGNOSIS — R60.0 LOWER EXTREMITY EDEMA: ICD-10-CM

## 2024-08-13 RX ORDER — FUROSEMIDE 20 MG
20 TABLET ORAL DAILY PRN
Qty: 30 TABLET | Refills: 0 | Status: SHIPPED | OUTPATIENT
Start: 2024-08-13

## 2024-08-19 ENCOUNTER — ANTICOAGULATION THERAPY VISIT (OUTPATIENT)
Dept: ANTICOAGULATION | Facility: CLINIC | Age: 79
End: 2024-08-19

## 2024-08-19 ENCOUNTER — LAB (OUTPATIENT)
Dept: LAB | Facility: CLINIC | Age: 79
End: 2024-08-19
Payer: COMMERCIAL

## 2024-08-19 ENCOUNTER — ALLIED HEALTH/NURSE VISIT (OUTPATIENT)
Dept: EDUCATION SERVICES | Facility: CLINIC | Age: 79
End: 2024-08-19
Payer: COMMERCIAL

## 2024-08-19 ENCOUNTER — OFFICE VISIT (OUTPATIENT)
Dept: FAMILY MEDICINE | Facility: CLINIC | Age: 79
End: 2024-08-19
Payer: COMMERCIAL

## 2024-08-19 VITALS
HEIGHT: 69 IN | HEART RATE: 66 BPM | OXYGEN SATURATION: 96 % | WEIGHT: 208 LBS | RESPIRATION RATE: 14 BRPM | DIASTOLIC BLOOD PRESSURE: 62 MMHG | BODY MASS INDEX: 30.81 KG/M2 | SYSTOLIC BLOOD PRESSURE: 130 MMHG

## 2024-08-19 DIAGNOSIS — Z79.01 LONG TERM CURRENT USE OF ANTICOAGULANT THERAPY: ICD-10-CM

## 2024-08-19 DIAGNOSIS — Z79.4 TYPE 2 DIABETES MELLITUS WITH DIABETIC NEUROPATHY, WITH LONG-TERM CURRENT USE OF INSULIN (H): Primary | ICD-10-CM

## 2024-08-19 DIAGNOSIS — E11.40 TYPE 2 DIABETES MELLITUS WITH DIABETIC NEUROPATHY, WITH LONG-TERM CURRENT USE OF INSULIN (H): Primary | ICD-10-CM

## 2024-08-19 DIAGNOSIS — I48.20 CHRONIC ATRIAL FIBRILLATION (H): ICD-10-CM

## 2024-08-19 DIAGNOSIS — I25.10 CAD (CORONARY ARTERY DISEASE): Primary | ICD-10-CM

## 2024-08-19 DIAGNOSIS — I48.20 CHRONIC ATRIAL FIBRILLATION (H): Primary | Chronic | ICD-10-CM

## 2024-08-19 DIAGNOSIS — Z79.4 TYPE 2 DIABETES MELLITUS WITH DIABETIC NEUROPATHY, WITH LONG-TERM CURRENT USE OF INSULIN (H): ICD-10-CM

## 2024-08-19 DIAGNOSIS — Z79.01 LONG TERM CURRENT USE OF ANTICOAGULANT THERAPY: Chronic | ICD-10-CM

## 2024-08-19 DIAGNOSIS — E11.40 TYPE 2 DIABETES MELLITUS WITH DIABETIC NEUROPATHY, WITH LONG-TERM CURRENT USE OF INSULIN (H): ICD-10-CM

## 2024-08-19 LAB
HBA1C MFR BLD: 7.9 % (ref 0–5.6)
INR BLD: 1.9 (ref 0.9–1.1)

## 2024-08-19 PROCEDURE — G0108 DIAB MANAGE TRN  PER INDIV: HCPCS | Performed by: DIETITIAN, REGISTERED

## 2024-08-19 PROCEDURE — G2211 COMPLEX E/M VISIT ADD ON: HCPCS | Performed by: STUDENT IN AN ORGANIZED HEALTH CARE EDUCATION/TRAINING PROGRAM

## 2024-08-19 PROCEDURE — 85610 PROTHROMBIN TIME: CPT

## 2024-08-19 PROCEDURE — 83036 HEMOGLOBIN GLYCOSYLATED A1C: CPT

## 2024-08-19 PROCEDURE — 36415 COLL VENOUS BLD VENIPUNCTURE: CPT

## 2024-08-19 PROCEDURE — 99213 OFFICE O/P EST LOW 20 MIN: CPT | Performed by: STUDENT IN AN ORGANIZED HEALTH CARE EDUCATION/TRAINING PROGRAM

## 2024-08-19 ASSESSMENT — PAIN SCALES - GENERAL: PAINLEVEL: NO PAIN (0)

## 2024-08-19 NOTE — PATIENT INSTRUCTIONS
Move your injections sites, go up higher on abdomen      2.    Stay as active as you can      3.    Try to only do 1 toast if you are having the hashbrowns.

## 2024-08-19 NOTE — PROGRESS NOTES
"  Assessment & Plan     Type 2 diabetes mellitus with diabetic neuropathy, with long-term current use of insulin (H)  Patient is a very pleasant 78 year old who presents today for diabetes check. In the interim since our last visit, working with DM ed and PharmD and has restarted jardiance. This has been helping glucoses significantly. Continue current regimen. Goal A1c at least <8 (under 7.5 if possible). Will see him back in November for medicare and DM recheck, and then push our visits to every 6 months since he is working with DM Ed/PharmD.   - Hemoglobin A1c      I spent a total of 21 minutes on the day of the visit.   Time spent by me doing chart review, history and exam, documentation and further activities per the note    The longitudinal plan of care for the diagnosis(es)/condition(s) as documented were addressed during this visit. Due to the added complexity in care, I will continue to support Poli Monge in the subsequent management and with ongoing continuity of care.    BMI  Estimated body mass index is 30.72 kg/m  as calculated from the following:    Height as of this encounter: 1.753 m (5' 9\").    Weight as of this encounter: 94.3 kg (208 lb).     Subjective   Poli Monge is a 78 year old, presenting for the following health issues:  Diabetes and Hypertension        8/19/2024     6:54 AM   Additional Questions   Roomed by Doris DO   Accompanied by Self     History of Present Illness       Hypertension: He presents for follow up of hypertension.  He does not check blood pressure  regularly outside of the clinic. Outpatient blood pressures have not been over 140/90. He follows a low salt diet.     He eats 0-1 servings of fruits and vegetables daily.He consumes 0 sweetened beverage(s) daily.He exercises with enough effort to increase his heart rate 9 or less minutes per day.  He exercises with enough effort to increase his heart rate 3 or less days per week.   He is taking medications regularly.   " "  Diabetes Follow-up    How often are you checking your blood sugar? Continuous glucose monitor  What time of day are you checking your blood sugars (select all that apply)?  Not applicable  Have you had any blood sugars above 200?  No  Have you had any blood sugars below 70?  No  What symptoms do you notice when your blood sugar is low?  Shaky  What concerns do you have today about your diabetes? None   Do you have any of these symptoms? (Select all that apply)  Numbness in feet and Burning in feet  Have you had a diabetic eye exam in the last 12 months? Yes- Date of last eye exam: Sept 2023,  Location: [facility]        BP Readings from Last 2 Encounters:   08/19/24 130/62   07/16/24 126/73     Hemoglobin A1C (%)   Date Value   05/20/2024 8.0 (H)   02/15/2024 10.4 (H)   10/03/2020 7.1 (H)   05/12/2020 6.3 (H)     LDL Cholesterol Calculated (mg/dL)   Date Value   08/05/2024 59   08/15/2023 54   02/22/2021 54   02/04/2020 74               Review of Systems  Constitutional, HEENT, cardiovascular, pulmonary, gi and gu systems are negative, except as otherwise noted.      Objective    /62   Pulse 66   Resp 14   Ht 1.753 m (5' 9\")   Wt 94.3 kg (208 lb)   SpO2 96%   BMI 30.72 kg/m    Body mass index is 30.72 kg/m .  Physical Exam  Constitutional:       Appearance: Normal appearance.   HENT:      Head: Normocephalic.   Eyes:      General: No scleral icterus.     Extraocular Movements: Extraocular movements intact.      Conjunctiva/sclera: Conjunctivae normal.   Cardiovascular:      Rate and Rhythm: Normal rate. Rhythm irregular.      Heart sounds: Normal heart sounds.   Pulmonary:      Effort: Pulmonary effort is normal.   Neurological:      General: No focal deficit present.      Mental Status: He is alert and oriented to person, place, and time.           Results from this visitNo results found for any visits on 08/19/24.          Signed Electronically by: Myrna Clark MD    "

## 2024-08-19 NOTE — PROGRESS NOTES
ANTICOAGULATION MANAGEMENT     Antoine Russo 78 year old male is on warfarin with subtherapeutic INR result. (Goal INR 2.0-3.0)    Recent labs: (last 7 days)     08/19/24  0730   INR 1.9*       ASSESSMENT     Warfarin Lab Questionnaire    Warfarin Doses Last 7 Days      8/18/2024     6:06 PM   Dose in Tablet or Mg   TAB or MG? milligram (mg)     Pt Rptd Dose SUNDAY MONDAY TUESDAY WED THURS FRIDAY SATURDAY 8/18/2024   6:06 PM 1.25 2.5 1.25 2.5 1.25 2.5 1.25         8/18/2024   Warfarin Lab Questionnaire   Missed doses within past 14 days? No   Changes in diet or alcohol within past 14 days? No   Medication changes since last result? No   Injuries or illness since last result? No   New shortness of breath, severe headaches or sudden changes in vision since last result? No   Abnormal bleeding since last result? No   Upcoming surgery, procedure? No      Previous result: Therapeutic last visit; previously outside of goal range  Additional findings: None     PLAN     Recommended plan for no diet, medication or health factor changes affecting INR     Dosing Instructions: Increase your warfarin dose (10% change) with next INR in 2 weeks       Mychart sent with dosing and AVS.    Summary  As of 8/19/2024      Full warfarin instructions:  1.25 mg every Sun, Tue, Thu; 2.5 mg all other days   Next INR check:  9/2/2024               Telephone call with Poli or Saleem who verbalizes understanding and agrees to plan    Lab visit scheduled    Education provided: Please call back if any changes to your diet, medications or how you've been taking warfarin  Symptom monitoring: monitoring for clotting signs and symptoms, monitoring for stroke signs and symptoms, and when to seek medical attention/emergency care    Plan made per [facility] anticoagulation protocol    Madhavi Sanders RN  Anticoagulation Clinic  8/19/2024    _______________________________________________________________________     Anticoagulation Episode Summary        Current INR goal:  2.0-3.0   TTR:  74.8% (1 y)   Target end date:  Indefinite   Send INR reminders to:  [facility]    Indications    Atrial fibrillation (H) [I48.91]  Long term current use of anticoagulant therapy [Z79.01]  Chronic atrial fibrillation (H) [I48.20]             Comments:               Anticoagulation Care Providers       Provider Role Specialty Phone number    Katie Martino MD Referring Family Medicine [phone]    Brianna Matute APRN [facility] Referring Family Medicine [phone]    Myrna Clark MD Referring [facility] [phone]

## 2024-08-19 NOTE — PROGRESS NOTES
Diabetes Self-Management Education & Support    Presents for: Individual review    Type of Service: In Person Visit      ASSESSMENT:  -doing well, last 90 days average 181 last 30 days average 149.  A1c today 7.9%, next one will be even better  -not rotating his injections sites other than side to side, discussed this with him to rotate more.  He will move some injections up higher on stomach on both sides.  -due for eye exam coming up, appointment set in October  -discussed meal plan and ideas for higher carbohydrate meals.      Patient's most recent   Lab Results   Component Value Date    A1C 7.9 08/19/2024    A1C 7.1 10/03/2020     is meeting goal of <8.0    Diabetes knowledge and skills assessment:   Patient is knowledgeable in diabetes management concepts related to: Healthy Eating, Being Active, and Monitoring    Continue education with the following diabetes management concepts: Healthy Eating, Being Active, Monitoring, Taking Medication, Problem Solving, Reducing Risks, and Healthy Coping    Based on learning assessment above, most appropriate setting for further diabetes education would be: Individual setting.      PLAN   Move your injections sites, go up higher on abdomen      2.    Stay as active as you can      3.    Try to only do 1 toast if you are having the hashbrowns.        Follow-up: in three months    See Care Plan for co-developed, patient-state behavior change goals.  AVS provided for patient today.    Education Materials Provided:  No new materials provided today      SUBJECTIVE/OBJECTIVE:  Presents for: Individual review  Diabetes education in the past 24mo: Yes  Diabetes type: Type 2  Disease course: Stable  How confident are you filling out medical forms by yourself:: Extremely  Diabetes management related comments/concerns: no  Cultural Influences/Ethnic Background:  Not  or       Diabetes Symptoms & Complications:  Diabetes Related Symptoms: Fatigue  Weight trend:  "Stable  Symptom course: Stable  Disease course: Stable       Patient Problem List and Family Medical History reviewed for relevant medical history, current medical status, and diabetes risk factors.    Vitals:  There were no vitals taken for this visit.  Estimated body mass index is 30.72 kg/m  as calculated from the following:    Height as of an earlier encounter on 8/19/24: 1.753 m (5' 9\").    Weight as of an earlier encounter on 8/19/24: 94.3 kg (208 lb).   Last 3 BP:   BP Readings from Last 3 Encounters:   08/19/24 130/62   07/16/24 126/73   07/01/24 114/73       History   Smoking Status    Former    Packs/day: 1.00    Years: 40.00    Types: Cigarettes    Quit date: 11/24/2006   Smokeless Tobacco    Never       Labs:  Lab Results   Component Value Date    A1C 7.9 08/19/2024    A1C 7.1 10/03/2020     Lab Results   Component Value Date     08/05/2024     11/03/2022     07/19/2022     02/22/2021     Lab Results   Component Value Date    LDL 59 08/05/2024    LDL 54 02/22/2021     HDL Cholesterol   Date Value Ref Range Status   02/22/2021 29 (L) >39 mg/dL Final     Direct Measure HDL   Date Value Ref Range Status   08/05/2024 35 (L) >=40 mg/dL Final   ]  GFR Estimate   Date Value Ref Range Status   08/05/2024 80 >60 mL/min/1.73m2 Final     Comment:     eGFR calculated using 2021 CKD-EPI equation.   02/22/2021 65 >60 mL/min/[1.73_m2] Final     Comment:     Non  GFR Calc  Starting 12/18/2018, serum creatinine based estimated GFR (eGFR) will be   calculated using the Chronic Kidney Disease Epidemiology Collaboration   (CKD-EPI) equation.       GFR, ESTIMATED POCT   Date Value Ref Range Status   09/13/2022 >60 >60 mL/min/1.73m2 Final     GFR Estimate If Black   Date Value Ref Range Status   02/22/2021 76 >60 mL/min/[1.73_m2] Final     Comment:      GFR Calc  Starting 12/18/2018, serum creatinine based estimated GFR (eGFR) will be   calculated using the Chronic " "Kidney Disease Epidemiology Collaboration   (CKD-EPI) equation.       Lab Results   Component Value Date    CR 0.98 08/05/2024    CR 1.11 02/22/2021     No results found for: \"MICROALBUMIN\"    Healthy Eating:  Cultural/Buddhist diet restrictions?: No  How many times a week on average do you eat food made away from home (restaurant/take-out)?: 3  Meals include: Breakfast, Dinner  Breakfast: alexsandra caro breakfast bowl or steak and eggs at [employer]  Lunch: none  Dinner: lunch special and has it for supper: hamburger steak with mushroom and french fries (just eats a few)  Snacks: not much occasionally potato chips  Beverages: Water, Coffee, Alcohol    Being Active:  Being Active Assessed Today:  (cant walk too far, does what he can)  Barrier to exercise: Physical limitation    Monitoring:  Blood Glucose Meter: FreeStyle  Times checking blood sugar at home (number): 5+  Times checking blood sugar at home (per): Day        Taking Medications:  Diabetes Medication(s)       Insulin       insulin glargine (LANTUS PEN) 100 UNIT/ML pen Inject 50 Units Subcutaneous at bedtime Increase by 2 units every 3 days until blood sugars in am under 120 up to max 60 units daily       Sodium-Glucose Co-Transporter 2 (SGLT2) Inhibitors       empagliflozin (JARDIANCE) 10 MG TABS tablet Take 1 tablet (10 mg) by mouth daily            Current Treatments: Insulin Injections    Problem Solving:                 Reducing Risks:  Has dilated eye exam at least once a year?: Yes  Sees dentist every 6 months?: No  Feet checked by healthcare provider in the last year?: Yes    Healthy Coping:  Informal Support system:: Spouse  Patient Activation Measure Survey Score:      2/15/2011     9:00 AM   RIDDHI Score (Last Two)   RIDDHI Raw Score 41   Activation Score 63.2   RIDDHI Level 3         Care Plan and Education Provided:  There are no care plans that you recently modified to display for this patient.          Time Spent: 30 minutes  Encounter Type: " Individual    Any diabetes medication dose changes were made via the CDE Protocol per the patient's primary care provider. A copy of this encounter was shared with the provider.

## 2024-09-05 ENCOUNTER — LAB (OUTPATIENT)
Dept: LAB | Facility: CLINIC | Age: 79
End: 2024-09-05
Payer: COMMERCIAL

## 2024-09-05 ENCOUNTER — ANTICOAGULATION THERAPY VISIT (OUTPATIENT)
Dept: ANTICOAGULATION | Facility: CLINIC | Age: 79
End: 2024-09-05

## 2024-09-05 DIAGNOSIS — I48.20 CHRONIC ATRIAL FIBRILLATION (H): Primary | Chronic | ICD-10-CM

## 2024-09-05 DIAGNOSIS — I48.20 CHRONIC ATRIAL FIBRILLATION (H): ICD-10-CM

## 2024-09-05 DIAGNOSIS — Z79.01 LONG TERM CURRENT USE OF ANTICOAGULANT THERAPY: Chronic | ICD-10-CM

## 2024-09-05 DIAGNOSIS — Z79.01 LONG TERM CURRENT USE OF ANTICOAGULANT THERAPY: ICD-10-CM

## 2024-09-05 LAB — INR BLD: 2.4 (ref 0.9–1.1)

## 2024-09-05 PROCEDURE — 36416 COLLJ CAPILLARY BLOOD SPEC: CPT

## 2024-09-05 PROCEDURE — 85610 PROTHROMBIN TIME: CPT

## 2024-09-05 NOTE — PROGRESS NOTES
ANTICOAGULATION MANAGEMENT     Antoine Russo 78 year old male is on warfarin with therapeutic INR result. (Goal INR 2.0-3.0)    Recent labs: (last 7 days)     09/05/24  0806   INR 2.4*       ASSESSMENT     Warfarin Lab Questionnaire    Warfarin Doses Last 7 Days      9/4/2024     6:59 PM   Dose in Tablet or Mg   TAB or MG? milligram (mg)     Pt Rptd Dose SUNDAY MONDAY TUESDAY WED THURS FRIDAY SATURDAY 9/4/2024   6:59 PM 1.25 2.5 1.25 2.5 1.25 2.5 2.5         9/4/2024   Warfarin Lab Questionnaire   Missed doses within past 14 days? No   Changes in diet or alcohol within past 14 days? No   Medication changes since last result? No   Injuries or illness since last result? No   New shortness of breath, severe headaches or sudden changes in vision since last result? No   Abnormal bleeding since last result? No   Upcoming surgery, procedure? No   Best number to call with results? ok      Previous result: Subtherapeutic  Additional findings: None     PLAN     Recommended plan for no diet, medication or health factor changes affecting INR     Dosing Instructions: Continue your current warfarin dose with next INR in 3 weeks       Summary  As of 9/5/2024      Full warfarin instructions:  1.25 mg every Sun, Tue, Thu; 2.5 mg all other days   Next INR check:  9/27/2024               Telephone call with Poli or Saleem who verbalizes understanding and agrees to plan    Lab visit scheduled    Education provided: Please call back if any changes to your diet, medications or how you've been taking warfarin    Plan made per [facility] anticoagulation protocol    Madhavi Sanders RN  Anticoagulation Clinic  9/5/2024    _______________________________________________________________________     Anticoagulation Episode Summary       Current INR goal:  2.0-3.0   TTR:  73.8% (1 y)   Target end date:  Indefinite   Send INR reminders to:  [facility]    Indications    Atrial fibrillation (H) [I48.91]  Long term current use of  anticoagulant therapy [Z79.01]  Chronic atrial fibrillation (H) [I48.20]             Comments:               Anticoagulation Care Providers       Provider Role Specialty Phone number    Katie Martino MD Referring Family Medicine [phone]    Brianna Matute APRN [facility] Referring Family Medicine [phone]    Myrna Clark MD Referring [facility] Medicine [phone]

## 2024-09-24 ENCOUNTER — TRANSFERRED RECORDS (OUTPATIENT)
Dept: MULTI SPECIALTY CLINIC | Facility: CLINIC | Age: 79
End: 2024-09-24

## 2024-09-24 LAB — RETINOPATHY: NORMAL

## 2024-09-27 ENCOUNTER — LAB (OUTPATIENT)
Dept: LAB | Facility: CLINIC | Age: 79
End: 2024-09-27
Payer: COMMERCIAL

## 2024-09-27 ENCOUNTER — ANTICOAGULATION THERAPY VISIT (OUTPATIENT)
Dept: ANTICOAGULATION | Facility: CLINIC | Age: 79
End: 2024-09-27

## 2024-09-27 DIAGNOSIS — I48.20 CHRONIC ATRIAL FIBRILLATION (H): ICD-10-CM

## 2024-09-27 DIAGNOSIS — I48.20 CHRONIC ATRIAL FIBRILLATION (H): Primary | Chronic | ICD-10-CM

## 2024-09-27 DIAGNOSIS — Z79.01 LONG TERM CURRENT USE OF ANTICOAGULANT THERAPY: ICD-10-CM

## 2024-09-27 DIAGNOSIS — Z79.01 LONG TERM CURRENT USE OF ANTICOAGULANT THERAPY: Chronic | ICD-10-CM

## 2024-09-27 LAB — INR BLD: 2.7 (ref 0.9–1.1)

## 2024-09-27 PROCEDURE — 85610 PROTHROMBIN TIME: CPT

## 2024-09-27 PROCEDURE — 36416 COLLJ CAPILLARY BLOOD SPEC: CPT

## 2024-09-27 NOTE — PROGRESS NOTES
ANTICOAGULATION MANAGEMENT     Antoine Russo 78 year old male is on warfarin with therapeutic INR result. (Goal INR 2.0-3.0)    Recent labs: (last 7 days)     09/27/24  0725   INR 2.7*       ASSESSMENT     Source(s): Chart Review and Patient/Caregiver Call     Warfarin doses taken: Warfarin taken as instructed  Diet: No new diet changes identified  Medication/supplement changes: None noted  New illness, injury, or hospitalization: No  Signs or symptoms of bleeding or clotting: No  Previous result: Therapeutic last visit; previously outside of goal range  Additional findings: None     PLAN     Recommended plan for no diet, medication or health factor changes affecting INR     Dosing Instructions: Continue your current warfarin dose with next INR in 4 weeks       Summary  As of 9/27/2024      Full warfarin instructions:  1.25 mg every Sun, Tue, Thu; 2.5 mg all other days   Next INR check:  10/23/2024               Telephone call with Poli or Saleem who verbalizes understanding and agrees to plan    Lab visit scheduled    Education provided: Please call back if any changes to your diet, medications or how you've been taking warfarin    Plan made per [facility] anticoagulation protocol    Madhavi Sanders RN  9/27/2024  Anticoagulation Clinic  [employer] for routing messages: [employer] [facility] patient phone line: [phone]        _______________________________________________________________________     Anticoagulation Episode Summary       Current INR goal:  2.0-3.0   TTR:  73.8% (1 y)   Target end date:  Indefinite   Send INR reminders to:  [facility]    Indications    Atrial fibrillation (H) [I48.91]  Long term current use of anticoagulant therapy [Z79.01]  Chronic atrial fibrillation (H) [I48.20]             Comments:               Anticoagulation Care Providers       Provider Role Specialty Phone number    Katie Martino MD Referring Family Medicine [phone]    Brianna Matute,  APRN CNP Referring Family Medicine [phone]    Myrna Clark MD Referring Family Medicine [phone]

## 2024-10-23 ENCOUNTER — LAB (OUTPATIENT)
Dept: LAB | Facility: CLINIC | Age: 79
End: 2024-10-23
Payer: COMMERCIAL

## 2024-10-23 ENCOUNTER — ANTICOAGULATION THERAPY VISIT (OUTPATIENT)
Dept: ANTICOAGULATION | Facility: CLINIC | Age: 79
End: 2024-10-23

## 2024-10-23 DIAGNOSIS — Z79.01 LONG TERM CURRENT USE OF ANTICOAGULANT THERAPY: Chronic | ICD-10-CM

## 2024-10-23 DIAGNOSIS — I48.20 CHRONIC ATRIAL FIBRILLATION (H): ICD-10-CM

## 2024-10-23 DIAGNOSIS — I48.20 CHRONIC ATRIAL FIBRILLATION (H): Primary | Chronic | ICD-10-CM

## 2024-10-23 DIAGNOSIS — N18.2 CHRONIC KIDNEY DISEASE, STAGE 2 (MILD): Primary | ICD-10-CM

## 2024-10-23 DIAGNOSIS — Z79.01 LONG TERM CURRENT USE OF ANTICOAGULANT THERAPY: ICD-10-CM

## 2024-10-23 LAB — INR BLD: 2.6 (ref 0.9–1.1)

## 2024-10-23 PROCEDURE — 36416 COLLJ CAPILLARY BLOOD SPEC: CPT

## 2024-10-23 PROCEDURE — 85610 PROTHROMBIN TIME: CPT

## 2024-10-23 NOTE — PROGRESS NOTES
ANTICOAGULATION MANAGEMENT     Antoine Russo 78 year old male is on warfarin with therapeutic INR result. (Goal INR 2.0-3.0)    Recent labs: (last 7 days)     10/23/24  0720   INR 2.6*       ASSESSMENT     Source(s): Chart Review and Patient/Caregiver Call     Warfarin doses taken: Warfarin taken as instructed  Diet: No new diet changes identified  Medication/supplement changes: None noted  New illness, injury, or hospitalization: No  Signs or symptoms of bleeding or clotting: No  Previous result: Therapeutic last 2(+) visits  Additional findings: None       PLAN     Recommended plan for no diet, medication or health factor changes affecting INR     Dosing Instructions: Continue your current warfarin dose with next INR in 4 weeks       Summary  As of 10/23/2024      Full warfarin instructions:  1.25 mg every Sun, Tue, Thu; 2.5 mg all other days   Next INR check:  11/18/2024               Telephone call with Poli or Saleem who agrees to plan and repeated back plan correctly    Check at provider office visit    Education provided: Contact [phone] with any changes, questions or concerns.     Plan made per [facility] anticoagulation protocol    Luisa Serrano RN  10/23/2024  Anticoagulation Clinic  [employer] for routing messages: p [facility] patient phone line: [phone]        _______________________________________________________________________     Anticoagulation Episode Summary       Current INR goal:  2.0-3.0   TTR:  73.8% (1 y)   Target end date:  Indefinite   Send INR reminders to:  [facility]    Indications    Atrial fibrillation (H) [I48.91]  Long term current use of anticoagulant therapy [Z79.01]  Chronic atrial fibrillation (H) [I48.20]             Comments:  --             Anticoagulation Care Providers       Provider Role Specialty Phone number    Katie Martino MD Referring Family Medicine [phone]    Brianna Matute APRN CNP Referring Family Medicine  [phone]    Myrna Clark MD [facility] Family Medicine [phone]

## 2024-11-04 ENCOUNTER — TELEPHONE (OUTPATIENT)
Dept: FAMILY MEDICINE | Facility: CLINIC | Age: 79
End: 2024-11-04
Payer: COMMERCIAL

## 2024-11-04 DIAGNOSIS — Z79.4 TYPE 2 DIABETES MELLITUS WITH DIABETIC NEUROPATHY, WITH LONG-TERM CURRENT USE OF INSULIN (H): Primary | ICD-10-CM

## 2024-11-04 DIAGNOSIS — E11.40 TYPE 2 DIABETES MELLITUS WITH DIABETIC NEUROPATHY, WITH LONG-TERM CURRENT USE OF INSULIN (H): Primary | ICD-10-CM

## 2024-11-04 RX ORDER — HYDROCHLOROTHIAZIDE 12.5 MG/1
CAPSULE ORAL
Qty: 6 EACH | Refills: 0 | Status: SHIPPED | OUTPATIENT
Start: 2024-11-04

## 2024-11-04 NOTE — TELEPHONE ENCOUNTER
Patient has upcoming appointment with Dr. Myrna Clark 11/18/24.    Prescription approved per [facility] Refill Protocol.  Julie Behrendt RN

## 2024-11-13 SDOH — HEALTH STABILITY: PHYSICAL HEALTH: ON AVERAGE, HOW MANY MINUTES DO YOU ENGAGE IN EXERCISE AT THIS LEVEL?: 0 MIN

## 2024-11-13 SDOH — HEALTH STABILITY: PHYSICAL HEALTH: ON AVERAGE, HOW MANY DAYS PER WEEK DO YOU ENGAGE IN MODERATE TO STRENUOUS EXERCISE (LIKE A BRISK WALK)?: 0 DAYS

## 2024-11-13 ASSESSMENT — SOCIAL DETERMINANTS OF HEALTH (SDOH): HOW OFTEN DO YOU GET TOGETHER WITH FRIENDS OR RELATIVES?: MORE THAN THREE TIMES A WEEK

## 2024-11-18 ENCOUNTER — APPOINTMENT (OUTPATIENT)
Dept: LAB | Facility: CLINIC | Age: 79
End: 2024-11-18
Payer: COMMERCIAL

## 2024-11-18 ENCOUNTER — ANTICOAGULATION THERAPY VISIT (OUTPATIENT)
Dept: ANTICOAGULATION | Facility: CLINIC | Age: 79
End: 2024-11-18

## 2024-11-18 ENCOUNTER — OFFICE VISIT (OUTPATIENT)
Dept: FAMILY MEDICINE | Facility: CLINIC | Age: 79
End: 2024-11-18
Payer: COMMERCIAL

## 2024-11-18 VITALS
SYSTOLIC BLOOD PRESSURE: 150 MMHG | HEIGHT: 69 IN | DIASTOLIC BLOOD PRESSURE: 72 MMHG | BODY MASS INDEX: 31.4 KG/M2 | WEIGHT: 212 LBS | OXYGEN SATURATION: 93 % | RESPIRATION RATE: 16 BRPM | HEART RATE: 74 BPM

## 2024-11-18 DIAGNOSIS — N40.0 HYPERTROPHY OF PROSTATE WITHOUT URINARY OBSTRUCTION: ICD-10-CM

## 2024-11-18 DIAGNOSIS — R80.1 PERSISTENT PROTEINURIA: ICD-10-CM

## 2024-11-18 DIAGNOSIS — R39.15 URGENCY OF URINATION: ICD-10-CM

## 2024-11-18 DIAGNOSIS — I48.20 CHRONIC ATRIAL FIBRILLATION (H): ICD-10-CM

## 2024-11-18 DIAGNOSIS — Z12.5 SCREENING FOR PROSTATE CANCER: ICD-10-CM

## 2024-11-18 DIAGNOSIS — I48.20 CHRONIC ATRIAL FIBRILLATION (H): Primary | Chronic | ICD-10-CM

## 2024-11-18 DIAGNOSIS — N18.2 CHRONIC KIDNEY DISEASE, STAGE 2 (MILD): ICD-10-CM

## 2024-11-18 DIAGNOSIS — I70.213 ATHEROSCLEROSIS OF NATIVE ARTERIES OF EXTREMITIES WITH INTERMITTENT CLAUDICATION, BILATERAL LEGS (H): ICD-10-CM

## 2024-11-18 DIAGNOSIS — Z79.4 TYPE 2 DIABETES MELLITUS WITH DIABETIC NEUROPATHY, WITH LONG-TERM CURRENT USE OF INSULIN (H): ICD-10-CM

## 2024-11-18 DIAGNOSIS — Z79.01 LONG TERM CURRENT USE OF ANTICOAGULANT THERAPY: ICD-10-CM

## 2024-11-18 DIAGNOSIS — E11.40 TYPE 2 DIABETES MELLITUS WITH DIABETIC NEUROPATHY, WITH LONG-TERM CURRENT USE OF INSULIN (H): ICD-10-CM

## 2024-11-18 DIAGNOSIS — Z00.00 ENCOUNTER FOR MEDICARE ANNUAL WELLNESS EXAM: Primary | ICD-10-CM

## 2024-11-18 DIAGNOSIS — I73.9 PVD (PERIPHERAL VASCULAR DISEASE) (H): ICD-10-CM

## 2024-11-18 DIAGNOSIS — I10 HYPERTENSION, BENIGN ESSENTIAL, GOAL BELOW 140/90: ICD-10-CM

## 2024-11-18 DIAGNOSIS — Z79.01 LONG TERM CURRENT USE OF ANTICOAGULANT THERAPY: Chronic | ICD-10-CM

## 2024-11-18 LAB
CREAT UR-MCNC: 46.1 MG/DL
EST. AVERAGE GLUCOSE BLD GHB EST-MCNC: 194 MG/DL
HBA1C MFR BLD: 8.4 % (ref 0–5.6)
INR BLD: 2.4 (ref 0.9–1.1)
MICROALBUMIN UR-MCNC: 358.1 MG/L
MICROALBUMIN/CREAT UR: 776.79 MG/G CR (ref 0–17)
PSA SERPL DL<=0.01 NG/ML-MCNC: 1.34 NG/ML (ref 0–6.5)

## 2024-11-18 PROCEDURE — 36415 COLL VENOUS BLD VENIPUNCTURE: CPT | Performed by: STUDENT IN AN ORGANIZED HEALTH CARE EDUCATION/TRAINING PROGRAM

## 2024-11-18 PROCEDURE — 85610 PROTHROMBIN TIME: CPT | Performed by: STUDENT IN AN ORGANIZED HEALTH CARE EDUCATION/TRAINING PROGRAM

## 2024-11-18 PROCEDURE — 82043 UR ALBUMIN QUANTITATIVE: CPT | Performed by: STUDENT IN AN ORGANIZED HEALTH CARE EDUCATION/TRAINING PROGRAM

## 2024-11-18 PROCEDURE — 83036 HEMOGLOBIN GLYCOSYLATED A1C: CPT | Performed by: STUDENT IN AN ORGANIZED HEALTH CARE EDUCATION/TRAINING PROGRAM

## 2024-11-18 PROCEDURE — G0439 PPPS, SUBSEQ VISIT: HCPCS | Performed by: STUDENT IN AN ORGANIZED HEALTH CARE EDUCATION/TRAINING PROGRAM

## 2024-11-18 PROCEDURE — 99214 OFFICE O/P EST MOD 30 MIN: CPT | Mod: 25 | Performed by: STUDENT IN AN ORGANIZED HEALTH CARE EDUCATION/TRAINING PROGRAM

## 2024-11-18 PROCEDURE — G0103 PSA SCREENING: HCPCS | Performed by: STUDENT IN AN ORGANIZED HEALTH CARE EDUCATION/TRAINING PROGRAM

## 2024-11-18 PROCEDURE — 82570 ASSAY OF URINE CREATININE: CPT | Performed by: STUDENT IN AN ORGANIZED HEALTH CARE EDUCATION/TRAINING PROGRAM

## 2024-11-18 RX ORDER — PREGABALIN 150 MG/1
150 CAPSULE ORAL 2 TIMES DAILY
Qty: 180 CAPSULE | Refills: 3 | Status: SHIPPED | OUTPATIENT
Start: 2024-11-18

## 2024-11-18 RX ORDER — LISINOPRIL 20 MG/1
20 TABLET ORAL DAILY
Qty: 90 TABLET | Refills: 3 | Status: SHIPPED | OUTPATIENT
Start: 2024-11-18

## 2024-11-18 RX ORDER — TAMSULOSIN HYDROCHLORIDE 0.4 MG/1
0.4 CAPSULE ORAL DAILY
Qty: 90 CAPSULE | Refills: 3 | Status: SHIPPED | OUTPATIENT
Start: 2024-11-18

## 2024-11-18 RX ORDER — METOPROLOL SUCCINATE 50 MG/1
75 TABLET, EXTENDED RELEASE ORAL 2 TIMES DAILY
Qty: 270 TABLET | Refills: 3 | Status: SHIPPED | OUTPATIENT
Start: 2024-11-18

## 2024-11-18 RX ORDER — ATORVASTATIN CALCIUM 80 MG/1
80 TABLET, FILM COATED ORAL DAILY
Qty: 90 TABLET | Refills: 3 | Status: SHIPPED | OUTPATIENT
Start: 2024-11-18

## 2024-11-18 RX ORDER — HYDROCHLOROTHIAZIDE 12.5 MG/1
CAPSULE ORAL
Qty: 6 EACH | Refills: 3 | Status: SHIPPED | OUTPATIENT
Start: 2024-11-18

## 2024-11-18 ASSESSMENT — PAIN SCALES - GENERAL: PAINLEVEL_OUTOF10: NO PAIN (0)

## 2024-11-18 NOTE — PATIENT INSTRUCTIONS
Patient Education   Preventive Care Advice   This is general advice given by our system to help you stay healthy. However, your care team may have specific advice just for you. Please talk to your care team about your preventive care needs.  Nutrition  Eat 5 or more servings of fruits and vegetables each day.  Try wheat bread, brown rice and whole grain pasta (instead of white bread, rice, and pasta).  Get enough calcium and vitamin D. Check the label on foods and aim for 100% of the RDA (recommended daily allowance).  Lifestyle  Exercise at least 150 minutes each week  (30 minutes a day, 5 days a week).  Do muscle strengthening activities 2 days a week. These help control your weight and prevent disease.  No smoking.  Wear sunscreen to prevent skin cancer.  Have a dental exam and cleaning every 6 months.  Yearly exams  See your health care team every year to talk about:  Any changes in your health.  Any medicines your care team has prescribed.  Preventive care, family planning, and ways to prevent chronic diseases.  Shots (vaccines)   HPV shots (up to age 26), if you've never had them before.  Hepatitis B shots (up to age 59), if you've never had them before.  COVID-19 shot: Get this shot when it's due.  Flu shot: Get a flu shot every year.  Tetanus shot: Get a tetanus shot every 10 years.  Pneumococcal, hepatitis A, and RSV shots: Ask your care team if you need these based on your risk.  Shingles shot (for age 50 and up)  General health tests  Diabetes screening:  Starting at age 35, Get screened for diabetes at least every 3 years.  If you are younger than age 35, ask your care team if you should be screened for diabetes.  Cholesterol test: At age 39, start having a cholesterol test every 5 years, or more often if advised.  Bone density scan (DEXA): At age 50, ask your care team if you should have this scan for osteoporosis (brittle bones).  Hepatitis C: Get tested at least once in your life.  STIs (sexually  transmitted infections)  Before age 24: Ask your care team if you should be screened for STIs.  After age 24: Get screened for STIs if you're at risk. You are at risk for STIs (including HIV) if:  You are sexually active with more than one person.  You don't use condoms every time.  You or a partner was diagnosed with a sexually transmitted infection.  If you are at risk for HIV, ask about PrEP medicine to prevent HIV.  Get tested for HIV at least once in your life, whether you are at risk for HIV or not.  Cancer screening tests  Cervical cancer screening: If you have a cervix, begin getting regular cervical cancer screening tests starting at age 21.  Breast cancer scan (mammogram): If you've ever had breasts, begin having regular mammograms starting at age 40. This is a scan to check for breast cancer.  Colon cancer screening: It is important to start screening for colon cancer at age 45.  Have a colonoscopy test every 10 years (or more often if you're at risk) Or, ask your provider about stool tests like a FIT test every year or Cologuard test every 3 years.  To learn more about your testing options, visit:   .  For help making a decision, visit:   https://bit.ly/vf91633.  Prostate cancer screening test: If you have a prostate, ask your care team if a prostate cancer screening test (PSA) at age 55 is right for you.  Lung cancer screening: If you are a current or former smoker ages 50 to 80, ask your care team if ongoing lung cancer screenings are right for you.  For informational purposes only. Not to replace the advice of your health care provider. Copyright   2023 [facility]. All rights reserved. Clinically reviewed by the [facility]. [employer] [id number] - REV 01/24.  Learning About Activities of Daily Living  What are activities of daily living?     Activities of daily living (ADLs) are the basic self-care tasks you do every day. These include eating, bathing, dressing,  and moving around.  As you age, and if you have health problems, you may find that it's harder to do some of these tasks. If so, your doctor can suggest ideas that may help.  To measure what kind of help you may need, your doctor will ask how well you are able to do ADLs. Let your doctor know if there are any tasks that you are having trouble doing. This is an important first step to getting help. And when you have the help you need, you can stay as independent as possible.  How will a doctor assess your ADLs?  Asking about ADLs is part of a routine health checkup your doctor will likely do as you age. Your health check might be done in a doctor's office, in your home, or at a hospital. The goal is to find out if you are having any problems that could make it hard to care for yourself or that make it unsafe for you to be on your own.  To measure your ADLs, your doctor will ask how hard it is for you to do routine tasks. Your doctor may also want to know if you have changed the way you do a task because of a health problem. Your doctor may watch how you:  Walk back and forth.  Keep your balance while you stand or walk.  Move from sitting to standing or from a bed to a chair.  Button or unbutton a shirt or sweater.  Remove and put on your shoes.  It's common to feel a little worried or anxious if you find you can't do all the things you used to be able to do. Talking with your doctor about ADLs is a way to make sure you're as safe as possible and able to care for yourself as well as you can. You may want to bring a caregiver, friend, or family member to your checkup. They can help you talk to your doctor.  Follow-up care is a key part of your treatment and safety. Be sure to make and go to all appointments, and call your doctor if you are having problems. It's also a good idea to know your test results and keep a list of the medicines you take.  Current as of: October 24, 2023  Content Version: 14.2 2024 [facility]  [employer].   Care instructions adapted under license by your healthcare professional. If you have questions about a medical condition or this instruction, always ask your healthcare professional. [employer] disclaims any warranty or liability for your use of this information.    Preventing Falls: Care Instructions  Injuries and health problems such as trouble walking or poor eyesight can increase your risk of falling. So can some medicines. But there are things you can do to help prevent falls. You can exercise to get stronger. You can also arrange your home to make it safer.    Talk to your doctor about the medicines you take. Ask if any of them increase the risk of falls and whether they can be changed or stopped.   Try to exercise regularly. It can help improve your strength and balance. This can help lower your risk of falling.         Practice fall safety and prevention.   Wear low-heeled shoes that fit well and give your feet good support. Talk to your doctor if you have foot problems that make this hard.  Carry a cellphone or wear a medical alert device that you can use to call for help.  Use stepladders instead of chairs to reach high objects. Don't climb if you're at risk for falls. Ask for help, if needed.  Wear the correct eyeglasses, if you need them.        Make your home safer.   Remove rugs, cords, clutter, and furniture from walkways.  Keep your house well lit. Use night-lights in hallways and bathrooms.  Install and use sturdy handrails on stairways.  Wear nonskid footwear, even inside. Don't walk barefoot or in socks without shoes.        Be safe outside.   Use handrails, curb cuts, and ramps whenever possible.  Keep your hands free by using a shoulder bag or backpack.  Try to walk in well-lit areas. Watch out for uneven ground, changes in pavement, and debris.  Be careful in the winter. Walk on the grass or gravel when sidewalks are slippery. Use de-icer on steps and walkways.  "Add non-slip devices to shoes.    Put grab bars and nonskid mats in your shower or tub and near the toilet. Try to use a shower chair or bath bench when bathing.   Get into a tub or shower by putting in your weaker leg first. Get out with your strong side first. Have a phone or medical alert device in the bathroom with you.   Where can you learn more?  Go to https://www.Rsync.net.net/patiented  Enter G117 in the search box to learn more about \"Preventing Falls: Care Instructions.\"  Current as of: July 17, 2023  Content Version: 14.2 2024 [employer].   Care instructions adapted under license by your healthcare professional. If you have questions about a medical condition or this instruction, always ask your healthcare professional. Healthwise, Incorporated disclaims any warranty or liability for your use of this information.       "

## 2024-11-18 NOTE — PROGRESS NOTES
ANTICOAGULATION MANAGEMENT     Antoine Russo 78 year old male is on warfarin with therapeutic INR result. (Goal INR 2.0-3.0)    Recent labs: (last 7 days)     11/18/24  0748   INR 2.4*       ASSESSMENT     Source(s): Chart Review and Patient/Caregiver Call     Warfarin doses taken: Warfarin taken as instructed  Diet: No new diet changes identified  Medication/supplement changes: None noted  New illness, injury, or hospitalization: No  Signs or symptoms of bleeding or clotting: No  Previous result: Therapeutic last 2(+) visits  Additional findings: None     PLAN     Recommended plan for no diet, medication or health factor changes affecting INR     Dosing Instructions: Continue your current warfarin dose with next INR in 4 weeks       Summary  As of 11/18/2024      Full warfarin instructions:  1.25 mg every Sun, Tue, Thu; 2.5 mg all other days   Next INR check:  12/18/2024               Telephone call with Poli or Saleem who verbalizes understanding and agrees to plan    Lab visit scheduled    Education provided: Please call back if any changes to your diet, medications or how you've been taking warfarin    Plan made per [facility] anticoagulation protocol    Madhavi Sanders RN  11/18/2024  Anticoagulation Clinic  [employer] for routing messages: p [facility] patient phone line: [phone]        _______________________________________________________________________     Anticoagulation Episode Summary       Current INR goal:  2.0-3.0   TTR:  73.8% (1 y)   Target end date:  Indefinite   Send INR reminders to:  [facility]    Indications    Atrial fibrillation (H) [I48.91]  Long term current use of anticoagulant therapy [Z79.01]  Chronic atrial fibrillation (H) [I48.20]             Comments:  --             Anticoagulation Care Providers       Provider Role Specialty Phone number    Katie Martino MD Referring Family Medicine [phone]    Brianna Matute APRN CNP Referring Family  Medicine [phone]    Myrna Clark MD Referring Family Medicine [phone]

## 2024-11-18 NOTE — PROGRESS NOTES
Preventive Care Visit  [facility]  Myrna Clark MD, Family Medicine  Nov 18, 2024      Assessment & Plan     Encounter for Medicare annual wellness exam  Patient is a pleasant 78 year old who presents today for annual visit.     Type 2 diabetes mellitus with diabetic neuropathy, with long-term current use of insulin (H)  History of DM2, follows with DM Ed. Refilled supplies as below and collected A1c and album: creat lab today.   - Albumin Random Urine Quantitative with Creat Ratio  - Hemoglobin A1c  - Continuous Glucose Sensor (FREESTYLE LISA 3 PLUS SENSOR) MISC  Dispense: 6 each; Refill: 3  - empagliflozin (JARDIANCE) 10 MG TABS tablet  Dispense: 90 tablet; Refill: 3  - insulin glargine (LANTUS PEN) 100 UNIT/ML pen  Dispense: 60 mL; Refill: 3  - lisinopril (ZESTRIL) 20 MG tablet  Dispense: 90 tablet; Refill: 3  - pregabalin (LYRICA) 150 MG capsule  Dispense: 180 capsule; Refill: 3  - Hemoglobin A1c  - Albumin Random Urine Quantitative with Creat Ratio    Atherosclerosis of native arteries of extremities with intermittent claudication, bilateral legs (H)  PVD (peripheral vascular disease) (H)  Refilled medications. He continues to have some intermittent pains in the lower extremities. We discussed this certainly may be related to his underlying vascular disease. Has follow up with vascular surgery early next year. If worsening in the meantime, let's see if we can get him in sooner for recheck by them.   - atorvastatin (LIPITOR) 80 MG tablet  Dispense: 90 tablet; Refill: 3    Hypertension, benign essential, goal below 140/90  Refill. Blood pressure elevated today, but sounds like it is controlled at home.   - lisinopril (ZESTRIL) 20 MG tablet  Dispense: 90 tablet; Refill: 3  - metoprolol succinate ER (TOPROL XL) 50 MG 24 hr tablet  Dispense: 270 tablet; Refill: 3    Persistent proteinuria  Chronic kidney disease, stage 2 (mild)  As above.   - lisinopril (ZESTRIL) 20 MG tablet   "Dispense: 90 tablet; Refill: 3    Urgency of urination  Hypertrophy of prostate without urinary obstruction  refill  - tamsulosin (FLOMAX) 0.4 MG capsule  Dispense: 90 capsule; Refill: 3    Screening for prostate cancer  Discussed today, he opts to complete this screening.   - PSA, screen  - PSA, screen    Chronic atrial fibrillation (H)  Long term current use of anticoagulant therapy  Collected today.   - INR point of care (finger stick)    Patient has been advised of split billing requirements and indicates understanding: Yes    BMI  Estimated body mass index is 31.31 kg/m  as calculated from the following:    Height as of this encounter: 1.753 m (5' 9\").    Weight as of this encounter: 96.2 kg (212 lb).     Counseling  Appropriate preventive services were addressed with this patient via screening, questionnaire, or discussion as appropriate for fall prevention, nutrition, physical activity, Tobacco-use cessation, social engagement, weight loss and cognition.  Checklist reviewing preventive services available has been given to the patient.  Reviewed patient's diet, addressing concerns and/or questions.   The patient was instructed to see the dentist every 6 months.   Patient reported safety concerns were addressed today.      Terri Monge is a 78 year old, presenting for the following:  Wellness Visit        11/18/2024     6:54 AM   Additional Questions   Roomed by Doris DO   Accompanied by Self         Via the Health Maintenance questionnaire, the patient has reported the following services have been completed -Eye Exam: [facility], [facility] 2024-09-24, this information has been sent to the abstraction team.    HPI  Left side ankle and achilles . Feels almost like someone is stabbing him there.   Few times a day, lasting until he gets off of it.     Health Care Directive  Patient does not have a Health Care Directive: Discussed advance care planning with patient; information given to patient to " review.      11/13/2024   General Health   How would you rate your overall physical health? Good   Feel stress (tense, anxious, or unable to sleep) Not at all            11/13/2024   Nutrition   Diet: Regular (no restrictions)            11/13/2024   Exercise   Days per week of moderate/strenous exercise 0 days   Average minutes spent exercising at this level 0 min      (!) EXERCISE CONCERN      11/13/2024   Social Factors   Frequency of gathering with friends or relatives More than three times a week   Worry food won't last until get money to buy more No   Food not last or not have enough money for food? No   Do you have housing? (Housing is defined as stable permanent housing and does not include staying ouside in a car, in a tent, in an abandoned building, in an overnight shelter, or couch-surfing.) Yes   Are you worried about losing your housing? No   Lack of transportation? No   Unable to get utilities (heat,electricity)? No            11/18/2024   Fall Risk   Gait Speed Test (Document in seconds) 4   Gait Speed Test Interpretation Less than or equal to 5.00 seconds - PASS               11/13/2024   Activities of Daily Living- Home Safety   Needs help with the following daily activites None of the above   Safety concerns in the home No grab bars in the bathroom            11/13/2024   Dental   Dentist two times every year? (!) NO            11/13/2024   Hearing Screening   Hearing concerns? None of the above            11/13/2024   Driving Risk Screening   Patient/family members have concerns about driving No            11/13/2024   General Alertness/Fatigue Screening   Have you been more tired than usual lately? No            11/13/2024   Urinary Incontinence Screening   Bothered by leaking urine in past 6 months No            11/13/2024   TB Screening   Were you born outside of the [place]? No              Today's PHQ-2 Score:       2/15/2024     6:54 AM   PHQ-2 ( 1999 [employer])   Q1: Little interest or pleasure in  doing things 0    Q2: Feeling down, depressed or hopeless 0    PHQ-2 Score 0   Q1: Little interest or pleasure in doing things Not at all   Q2: Feeling down, depressed or hopeless Not at all   PHQ-2 Score 0       Patient-reported         2024   Substance Use   Alcohol more than 3/day or more than 7/wk No   Do you have a current opioid prescription? No   How severe/bad is pain from 1 to 10? 0/10 (No Pain)   Do you use any other substances recreationally? No        Social History     Tobacco Use    Smoking status: Former     Current packs/day: 0.00     Average packs/day: 1 pack/day for 40.0 years (40.0 ttl pk-yrs)     Types: Cigarettes     Start date: 1966     Quit date: 2006     Years since quittin.9    Smokeless tobacco: Never   Vaping Use    Vaping status: Never Used   Substance Use Topics    Alcohol use: Not Currently    Drug use: No       ASCVD Risk   The ASCVD Risk score (Chaka DK, et al., 2019) failed to calculate for the following reasons:    The valid total cholesterol range is 130 to 320 mg/dL          Reviewed and updated as needed this visit by Provider                    Current providers sharing in care for this patient include:  Patient Care Team:  Myrna Clark MD as PCP - General (Family Medicine)  Andrea García MD as MD (ENT)  Grabiel Plata MD as MD (Gastroenterology)  Marcio Aggarwal MD as MD (Urology)  TEJINDER Morales MD as MD (Urology)  Lilian Churchill, RN as Registered Nurse (Urology)  Felipa Parnell RD as Diabetes Educator (Dietitian, Registered)  Anais Guajardo MD as MD (Neurology)  Ivette Bell RPH as Pharmacist (Pharmacist)  Ivette Bell RPH as Assigned [facility] Pharmacist  Ruthann Alaniz RD as Diabetes Educator (Dietitian, Registered)  Zac Torres MD as Assigned Surgical Provider  Myrna Clark MD as Assigned PCP  Domonique Carrizales PA-C as Assigned Heart and Vascular Provider    The  "following health maintenance items are reviewed in Epic and correct as of today:  Health Maintenance   Topic Date Due    RSV VACCINE (1 - 1-dose 75+ series) Never done    INFLUENZA VACCINE (1) Never done    COVID-19 Vaccine (1 - 2024-25 season) Never done    EYE EXAM  09/14/2024    MICROALBUMIN  11/14/2024    A1C  05/18/2025    DIABETIC FOOT EXAM  05/20/2025    ANNUAL REVIEW OF HM ORDERS  05/20/2025    BMP  08/05/2025    LIPID  08/05/2025    MEDICARE ANNUAL WELLNESS VISIT  11/18/2025    FALL RISK ASSESSMENT  11/18/2025    MENINGITIS IMMUNIZATION (3 - Risk 2-dose series) 07/19/2027    DTAP/TDAP/TD IMMUNIZATION (2 - Td or Tdap) 03/12/2029    ADVANCE CARE PLANNING  11/18/2029    HEPATITIS C SCREENING  Completed    PHQ-2 (once per calendar year)  Completed    Pneumococcal Vaccine: 65+ Years  Completed    URINALYSIS  Completed    ZOSTER IMMUNIZATION  Completed    HPV IMMUNIZATION  Aged Out    RSV MONOCLONAL ANTIBODY  Aged Out    COLORECTAL CANCER SCREENING  Discontinued    LUNG CANCER SCREENING  Discontinued         Review of Systems  Constitutional, HEENT, cardiovascular, pulmonary, gi and gu systems are negative, except as otherwise noted.     Objective    Exam  BP (!) 150/72 (BP Location: Right arm, Patient Position: Sitting, Cuff Size: Adult Regular)   Pulse 74   Resp 16   Ht 1.753 m (5' 9\")   Wt 96.2 kg (212 lb)   SpO2 93%   BMI 31.31 kg/m     Estimated body mass index is 31.31 kg/m  as calculated from the following:    Height as of this encounter: 1.753 m (5' 9\").    Weight as of this encounter: 96.2 kg (212 lb).    Physical Exam  GENERAL: alert and no distress  EYES: Eyes grossly normal to inspection, and conjunctivae and sclerae normal  HENT: ear canals and TM's normal, nose and mouth without ulcers or lesions  NECK: no adenopathy, no asymmetry, masses, or scars  RESP: lungs clear to auscultation - no rales, rhonchi or wheezes  CV: regular rate and rhythm, normal S1 S2, no S3 or S4, no murmur, click or rub, " trace peripheral edema  ABDOMEN: soft, nontender, no hepatosplenomegaly, no masses and bowel sounds normal  MS: no gross musculoskeletal defects noted, no edema  SKIN: no suspicious lesions or rashes  NEURO: Normal strength and tone, mentation intact and speech normal  PSYCH: mentation appears normal, affect normal/bright     Results from this visit  Results for orders placed or performed in visit on 11/18/24   Hemoglobin A1c     Status: Abnormal   Result Value Ref Range    Estimated Average Glucose 194 (H) <117 mg/dL    Hemoglobin A1C 8.4 (H) 0.0 - 5.6 %    Narrative    Results consistent with previous, repeat testing unnecessary     INR point of care (finger stick)     Status: Abnormal   Result Value Ref Range    INR 2.4 (H) 0.9 - 1.1    Narrative    This test is intended for monitoring Coumadin therapy. Results are not accurate in patients with prolonged INR due to factor deficiency.              11/18/2024   Mini Cog   Clock Draw Score 2 Normal   3 Item Recall 3 objects recalled   Mini Cog Total Score 5                 Signed Electronically by: Myrna Clark MD

## 2024-12-03 ENCOUNTER — ALLIED HEALTH/NURSE VISIT (OUTPATIENT)
Dept: EDUCATION SERVICES | Facility: CLINIC | Age: 79
End: 2024-12-03
Payer: COMMERCIAL

## 2024-12-03 DIAGNOSIS — Z79.4 TYPE 2 DIABETES MELLITUS WITH DIABETIC NEUROPATHY, WITH LONG-TERM CURRENT USE OF INSULIN (H): Primary | ICD-10-CM

## 2024-12-03 DIAGNOSIS — E11.40 TYPE 2 DIABETES MELLITUS WITH DIABETIC NEUROPATHY, WITH LONG-TERM CURRENT USE OF INSULIN (H): Primary | ICD-10-CM

## 2024-12-03 PROCEDURE — G0108 DIAB MANAGE TRN  PER INDIV: HCPCS | Performed by: DIETITIAN, REGISTERED

## 2024-12-03 NOTE — PATIENT INSTRUCTIONS
When you finish up the Jardiance 10 mg you will start the Jardiance 25 mg dose.  ONCE you start the 25 mg dose you will drop your lantus by 4 units and watch numbers.  If you are having lows continue to drop by 2 units at a time.  Call me two weeks after starting Jardiance 25 mg dose. Ruthann [phone].      2.  Increase the lantus to 52 units until you increase the Jardiance.  IF you are going low drop it back down.

## 2024-12-14 ENCOUNTER — OFFICE VISIT (OUTPATIENT)
Dept: URGENT CARE | Facility: URGENT CARE | Age: 79
End: 2024-12-14
Payer: COMMERCIAL

## 2024-12-14 VITALS
WEIGHT: 206 LBS | DIASTOLIC BLOOD PRESSURE: 75 MMHG | TEMPERATURE: 97 F | RESPIRATION RATE: 16 BRPM | SYSTOLIC BLOOD PRESSURE: 152 MMHG | OXYGEN SATURATION: 93 % | HEART RATE: 95 BPM | BODY MASS INDEX: 30.42 KG/M2

## 2024-12-14 DIAGNOSIS — S46.812A STRAIN OF LEFT TRAPEZIUS MUSCLE, INITIAL ENCOUNTER: Primary | ICD-10-CM

## 2024-12-14 DIAGNOSIS — E11.40 TYPE 2 DIABETES MELLITUS WITH DIABETIC NEUROPATHY, WITH LONG-TERM CURRENT USE OF INSULIN (H): ICD-10-CM

## 2024-12-14 DIAGNOSIS — I48.20 CHRONIC ATRIAL FIBRILLATION (H): ICD-10-CM

## 2024-12-14 DIAGNOSIS — Z79.4 TYPE 2 DIABETES MELLITUS WITH DIABETIC NEUROPATHY, WITH LONG-TERM CURRENT USE OF INSULIN (H): ICD-10-CM

## 2024-12-14 PROCEDURE — 99213 OFFICE O/P EST LOW 20 MIN: CPT | Performed by: NURSE PRACTITIONER

## 2024-12-14 RX ORDER — CYCLOBENZAPRINE HCL 5 MG
5 TABLET ORAL 3 TIMES DAILY PRN
Qty: 15 TABLET | Refills: 0 | Status: SHIPPED | OUTPATIENT
Start: 2024-12-14

## 2024-12-14 NOTE — PATIENT INSTRUCTIONS
Tylenol 1000mg three times a day as needed for pain.  Flexeril one three times a day as needed for pain and stiffness, take this one when you don't have to go anywhere or save for bedtime.  Voltaren gel to that area 4 times a day as needed for pain.  Be seen again if pain persists in the next 3-5 days.  ER if it gets worse.     Ice for pain.  Heat for stiffness.

## 2024-12-14 NOTE — PROGRESS NOTES
SUBJECTIVE:   Antoine Russo is a 78 year old male presenting with a chief complaint of   Chief Complaint   Patient presents with    Neck Pain     Started couple days ago, says pain is on the left side, did not do anything that could have injured his neck.    Pt does recall turning his head yesterday for a merge in his car that might have affected the neck he says he had to turn it quite a ways to see.    Past Medical History:   Diagnosis Date    Acute gastritis 10/3/2020    Acute kidney injury (H) 04/17/2019    Acute on chronic pancreatitis (H) 01/23/2018    Acute pancreatitis 10/21/2018    Acute recurrent pancreatitis 10/17/2020    Acute right-sided low back pain with left-sided sciatica 2/4/2020    Bacteriuria with pyuria 04/17/2019    C. difficile colitis     Chronic atrial fibrillation (H)     On chronic anticoagulation with coumadin    Colon polyp 08/26/2011    Colonoscopy 8/2011-A sessile polyp was found in the cecum. The polyp was 6 mm in size. The polyp was removed with a hot snare. Resection and retrieval were complete. A sessile polyp was found in the proximal transverse colon. The polyp was 15 mm in size. The polyp was removed with a hot snare. Resection and retrieval were complete. A sessile polyp was found in the sigmoid colon. The polyp was 5 mm    Dehydration 2/16/2021    Diabetes mellitus (H)     type 2    Groin fluid collection 12/15/2012    CT 12/12- New (since 5/11) collection measuring at least 2.3 cm in diameter and 6.5 cm in length within the right inguinal canal. 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, N0, M0 carcinoma, underwent transcervical glossectomy, floor of mouth excision, BL neck dissection, adj radiation, and skin flap reconstruction    Pancreatic duct leak 5/3/2016    Recurrent pancreatitis     alcoholic pancreatitis     Family History   Problem Relation Age of Onset    Diabetes  Sister         onset age 50    Alzheimer Disease Mother          80    Alzheimer Disease Father          85    Diabetes Other         nephew type 1    Diabetes Other     Aneurysm Sister     Anesthesia Reaction No family hx of     Colon Cancer No family hx of     Colon Polyps No family hx of     Crohn's Disease No family hx of     Ulcerative Colitis No family hx of      Current Outpatient Medications   Medication Sig Dispense Refill    atorvastatin (LIPITOR) 80 MG tablet Take 1 tablet (80 mg) by mouth daily. 90 tablet 3    empagliflozin (JARDIANCE) 10 MG TABS tablet Take 1 tablet (10 mg) by mouth daily. 90 tablet 3    furosemide (LASIX) 20 MG tablet TAKE 1 TABLET BY MOUTH ONCE DAILY AS NEEDED 30 tablet 0    insulin glargine (LANTUS PEN) 100 UNIT/ML pen Inject 50 Units subcutaneously at bedtime. Increase by 2 units every 3 days until blood sugars in am under 120 up to max 60 units daily 60 mL 3    lisinopril (ZESTRIL) 20 MG tablet Take 1 tablet (20 mg) by mouth daily. 90 tablet 3    metoprolol succinate ER (TOPROL XL) 50 MG 24 hr tablet Take 1.5 tablets (75 mg) by mouth 2 times daily. 270 tablet 3    multivitamin, therapeutic with minerals (THERA-VIT-M) TABS Take 1 tablet by mouth daily. 30 each 1    pregabalin (LYRICA) 150 MG capsule Take 1 capsule (150 mg) by mouth 2 times daily. 180 capsule 3    tamsulosin (FLOMAX) 0.4 MG capsule Take 1 capsule (0.4 mg) by mouth daily. 90 capsule 3    vitamin B-12 (CYANOCOBALAMIN) 100 MCG tablet Take 100 mcg by mouth daily      Vitamin D3 (CHOLECALCIFEROL) 25 mcg (1000 units) tablet Take 1 tablet by mouth daily      warfarin ANTICOAGULANT (COUMADIN) 2.5 MG tablet Take 0.5 tab on Sun/Tues/Thurs and 1 tablet all other days,  or As directed by Anticoagulation clinic 70 tablet 1    Continuous Glucose Sensor (FREESTYLE LISA 3 PLUS SENSOR) MISC Change every 15 days. 6 each 3    empagliflozin (JARDIANCE) 25 MG TABS tablet Take 1 tablet (25 mg) by mouth daily. (Patient not taking:  Reported on 2024) 30 tablet 5    insulin pen needle (BD LUIS U/F) 32G X 4 MM miscellaneous USE PEN NEEDLE ONCE DAILY AS DIRECTED 60 each 0     Social History     Tobacco Use    Smoking status: Former     Current packs/day: 0.00     Average packs/day: 1 pack/day for 40.0 years (40.0 ttl pk-yrs)     Types: Cigarettes     Start date: 1966     Quit date: 2006     Years since quittin.0    Smokeless tobacco: Never   Substance Use Topics    Alcohol use: Not Currently       OBJECTIVE  BP (!) 152/75   Pulse 95   Temp 97  F (36.1  C) (Tympanic)   Resp 16   Wt 93.4 kg (206 lb)   SpO2 93%   BMI 30.42 kg/m      Physical Exam  Skin:             Pain with movement of head, cannot turn to the left. Slight pain with movement of the left arm. Pain with palpation of trapezius muscles.     Discussed use of flexeril, and voltaren gel with pt. Also mentioned steroid for inflammation and pain but pt is diabetic and on coumadin, so will forego that option today.     ASSESSMENT:  1. Strain of left trapezius muscle, initial encounter (Primary)    - cyclobenzaprine (FLEXERIL) 5 MG tablet; Take 1 tablet (5 mg) by mouth 3 times daily as needed for muscle spasms.  Dispense: 15 tablet; Refill: 0  - diclofenac (VOLTAREN) 1 % topical gel; Apply 2 g topically 4 times daily. Apply to left neck/shoulder area  Dispense: 50 g; Refill: 2    2. Type 2 diabetes mellitus with diabetic neuropathy, with long-term current use of insulin (H)  No steroid given due to diabetes.      3. Chronic atrial fibrillation (H)  Pt on coumadin, no steroid give as it may mess with INR. Pt due for INR check in 3 days. Last INR stable at 2.4 managed by PCP    PLAN:  Tylenol 1000mg three times a day as needed for pain.  Flexeril one three times a day as needed for pain and stiffness, take this one when you don't have to go anywhere or save for bedtime.  Voltaren gel to that area 4 times a day as needed for pain.  Be seen again if pain persists in  the next 3-5 days.  ER if it gets worse.     Ice for pain.  Heat for stiffness.

## 2024-12-18 ENCOUNTER — ANTICOAGULATION THERAPY VISIT (OUTPATIENT)
Dept: ANTICOAGULATION | Facility: CLINIC | Age: 79
End: 2024-12-18

## 2024-12-18 ENCOUNTER — LAB (OUTPATIENT)
Dept: LAB | Facility: CLINIC | Age: 79
End: 2024-12-18
Payer: COMMERCIAL

## 2024-12-18 DIAGNOSIS — I48.20 CHRONIC ATRIAL FIBRILLATION (H): ICD-10-CM

## 2024-12-18 DIAGNOSIS — Z79.01 LONG TERM CURRENT USE OF ANTICOAGULANT THERAPY: ICD-10-CM

## 2024-12-18 DIAGNOSIS — Z79.01 LONG TERM CURRENT USE OF ANTICOAGULANT THERAPY: Chronic | ICD-10-CM

## 2024-12-18 DIAGNOSIS — I48.20 CHRONIC ATRIAL FIBRILLATION (H): Primary | Chronic | ICD-10-CM

## 2024-12-18 LAB — INR BLD: 2 (ref 0.9–1.1)

## 2024-12-18 PROCEDURE — 36416 COLLJ CAPILLARY BLOOD SPEC: CPT

## 2024-12-18 PROCEDURE — 85610 PROTHROMBIN TIME: CPT

## 2024-12-18 NOTE — PROGRESS NOTES
ANTICOAGULATION MANAGEMENT     Antoine Russo 78 year old male is on warfarin with therapeutic INR result. (Goal INR 2.0-3.0)    Recent labs: (last 7 days)     12/18/24  0708   INR 2.0*       ASSESSMENT     Source(s): Chart Review and Patient/Caregiver Call     Warfarin doses taken: Warfarin taken as instructed  Diet: No new diet changes identified  Medication/supplement changes:  flexeril as needed use No interaction anticipated  Voltaren gel for neck strain No interaction anticipated  New illness, injury, or hospitalization: Yes: neck strain, patient states it is feeling much better now  Signs or symptoms of bleeding or clotting: No  Previous result: Therapeutic last 2(+) visits  Additional findings: None       PLAN     Recommended plan for no diet, medication or health factor changes affecting INR     Dosing Instructions: Continue your current warfarin dose with next INR in 5 weeks       Summary  As of 12/18/2024      Full warfarin instructions:  1.25 mg every Sun, Tue, Thu; 2.5 mg all other days   Next INR check:  1/22/2025               Telephone call with Poli or Saleem who verbalizes understanding and agrees to plan and who agrees to plan and repeated back plan correctly    Lab visit scheduled    Education provided: Importance of notifying anticoagulation clinic for: changes in medications; a sooner lab recheck maybe needed and diarrhea, nausea/vomiting, reduced intake, cold/flu, and/or infections; a sooner lab recheck maybe needed  Contact [phone] with any changes, questions or concerns.     Plan made per [facility] anticoagulation protocol    Maria Victoria Castaneda RN  12/18/2024  Anticoagulation Clinic  [employer] for routing messages: christos [facility] patient phone line: [phone]        _______________________________________________________________________     Anticoagulation Episode Summary       Current INR goal:  2.0-3.0   TTR:  73.8% (1 y)   Target end date:  Indefinite   Send INR  reminders to:  [facility]    Indications    Atrial fibrillation (H) [I48.91]  Long term current use of anticoagulant therapy [Z79.01]  Chronic atrial fibrillation (H) [I48.20]             Comments:  --             Anticoagulation Care Providers       Provider Role Specialty Phone number    Katie Martino MD Referring Family Medicine [phone]    Brianna Matute APRN CNP Referring Family Medicine [phone]    Myrna Clark MD Referring Family Medicine [phone]

## 2024-12-27 NOTE — PROGRESS NOTES
Medication Therapy Management (MTM) Encounter    ASSESSMENT:                            Medication Adherence/Access: No issues identified.    Type 2 Diabetes: Patient is not meeting A1c goal of < 8%. Patient is meeting goal of > 70% time in target with continuous glucose monitoring. Agree with current plan in place for Jardiance dose increase and insulin adjustment as appropriate. Also would recheck BMP in 1 month after. We again discussed rare amputation risk associated with SGLT2 inhibitors given his PAD comorbidity, however evidence is conflicting and is tolerating well, thus felt benefit outweighs risk at this time and counseled to monitor closely. Patient expressed understanding and appreciates the information.    Afib/Hypertension/Hyperlipidemia/PAD/CAD/CKD: Last INR at goal of 2-3. Patient is meeting blood pressure goal of < 140/90mmHg (per problem list). Today BP reading lower in right vs left arm so will continue monitoring. Patient is on appropriate high-intensity statin and LDL is well-controlled <70 mg/dL. Follow-up plan in place with vascular surgeon and anticoag team.    PLAN:                            When run out of Jardiance 10mg tablets, then increase to Jardiance 25mg daily and follow-up with Ruthann as directed.  Plan on rechecking lab in 1 month after Jardiance dose increase. Placed a future order for basic metabolic panel and can draw at INR appointment around that time.    Follow-up: Return in about 3 weeks (around 1/20/2025) for Diabetes Education, via phone.    SUBJECTIVE/OBJECTIVE:                          Poli Russo is a 78 year old male seen for a follow-up visit.      Reason for visit: Recheck blood sugars.    Allergies/ADRs: Reviewed in chart  Past Medical History: Reviewed in chart  Tobacco: He reports that he quit smoking about 18 years ago. His smoking use included cigarettes. He started smoking about 58 years ago. He has a 40 pack-year smoking history. He has never used  smokeless tobacco.  Alcohol: 3-4 beers daily    Medication Adherence/Access: No issues reported.    Type 2 Diabetes:    Lantus 52 units every evening.  Jardiance 10mg daily - has about a week supply left, then will increase to 25mg daily and adjust insulin as directed.  Patient reports no current medication side effects.  Blood sugar monitoring: Continuous Glucose Monitor - Freestyle Berta 3+, see report below.  Current diabetes symptoms: none.  Diet/Exercise: Tries to limit carb intake. Follows with Ruthann RICK - reviewed last note from 12/3/24.  Eye/foot exams: up to date  Medication history: Metformin ER caused diarrhea. Actos caused swelling. Also notable history of recurrent pancreatitis.           Lab Results   Component Value Date    A1C 8.4 11/18/2024    A1C 7.9 08/19/2024    A1C 8.0 05/20/2024    A1C 10.4 02/15/2024    A1C 9.1 11/14/2023    A1C 7.1 10/03/2020    A1C 6.3 05/12/2020    A1C 6.5 02/04/2020    A1C 9.0 10/17/2019    A1C 9.7 07/18/2019     Afib/Hypertension/Hyperlipidemia/PAD/CAD/CKD:   Lisinopril 20mg daily.  Metoprolol ER 75mg twice daily (as 1.5 tabs of 50mg).  Atorvastatin 80mg daily.  Warfarin 1.25mg on Sun/Tue/Thu and 2.5mg on all other days as directed (INR goal 2-3). No longer on aspirin due to nosebleeds.  Furosemide 20mg daily as needed - no recent use.  Patient does not self-monitor blood pressure. Clinic readings today: 1st (left arm) /73 HR 71, 2nd (left arm) /71 HR 76, 3rd (right arm) /72 HR 71. Drank coffee this morning.  Patient reports no current medication side effects. States left arm bruises easily, however no issues with right arm and no other bleeding concerns. Patient does have a history of GI bleed.  Follows with vascular surgery - reviewed last note from 7/16/24, next visit scheduled in February.    INR   Date Value Ref Range Status   12/18/2024 2.0 (H) 0.9 - 1.1 Final     Recent Labs   Lab Test 08/05/24  0848 08/15/23  0733   CHOL 119 113   HDL 35* 35*   LDL  "59 54   TRIG 127 120     Today's Vitals: /72   Pulse 71  (declined weight)    BP Readings from Last 3 Encounters:   12/30/24 121/72   12/14/24 (!) 152/75   11/18/24 (!) 150/72     Pulse Readings from Last 3 Encounters:   12/30/24 71   12/14/24 95   11/18/24 74     Wt Readings from Last 1 Encounters:   12/14/24 206 lb (93.4 kg)     Ht Readings from Last 1 Encounters:   11/18/24 5' 9\" (1.753 m)     Estimated body mass index is 30.42 kg/m  as calculated from the following:    Height as of 11/18/24: 5' 9\" (1.753 m).    Weight as of 12/14/24: 206 lb (93.4 kg).  ----------------    I spent 20 minutes with this patient today. All changes were made via collaborative practice agreement with Myrna Clark MD.     A summary of these recommendations was sent via [employer].    Ivette Bell, PharmD, BCACP  Medication Therapy Management Pharmacist  [facility]     Medication Therapy Recommendations  No medication therapy recommendations to display   "

## 2024-12-28 DIAGNOSIS — I48.20 CHRONIC ATRIAL FIBRILLATION (H): ICD-10-CM

## 2024-12-30 ENCOUNTER — OFFICE VISIT (OUTPATIENT)
Dept: PHARMACY | Facility: CLINIC | Age: 79
End: 2024-12-30
Payer: COMMERCIAL

## 2024-12-30 VITALS — DIASTOLIC BLOOD PRESSURE: 72 MMHG | SYSTOLIC BLOOD PRESSURE: 121 MMHG | HEART RATE: 71 BPM

## 2024-12-30 DIAGNOSIS — I10 HYPERTENSION, BENIGN ESSENTIAL, GOAL BELOW 140/90: ICD-10-CM

## 2024-12-30 DIAGNOSIS — E11.40 TYPE 2 DIABETES MELLITUS WITH DIABETIC NEUROPATHY, WITH LONG-TERM CURRENT USE OF INSULIN (H): Primary | ICD-10-CM

## 2024-12-30 DIAGNOSIS — I25.10 CORONARY ARTERY DISEASE INVOLVING NATIVE HEART, UNSPECIFIED VESSEL OR LESION TYPE, UNSPECIFIED WHETHER ANGINA PRESENT: ICD-10-CM

## 2024-12-30 DIAGNOSIS — I48.20 CHRONIC ATRIAL FIBRILLATION (H): ICD-10-CM

## 2024-12-30 DIAGNOSIS — E78.5 HYPERLIPIDEMIA LDL GOAL <100: ICD-10-CM

## 2024-12-30 DIAGNOSIS — I73.9 PERIPHERAL VASCULAR DISEASE (H): ICD-10-CM

## 2024-12-30 DIAGNOSIS — Z79.4 TYPE 2 DIABETES MELLITUS WITH DIABETIC NEUROPATHY, WITH LONG-TERM CURRENT USE OF INSULIN (H): Primary | ICD-10-CM

## 2024-12-30 DIAGNOSIS — N18.2 CHRONIC KIDNEY DISEASE, STAGE 2 (MILD): ICD-10-CM

## 2024-12-30 PROCEDURE — 99607 MTMS BY PHARM ADDL 15 MIN: CPT | Performed by: PHARMACIST

## 2024-12-30 PROCEDURE — 99606 MTMS BY PHARM EST 15 MIN: CPT | Performed by: PHARMACIST

## 2024-12-30 RX ORDER — ACETAMINOPHEN 500 MG
1000 TABLET ORAL EVERY 8 HOURS PRN
COMMUNITY

## 2024-12-30 RX ORDER — WARFARIN SODIUM 2.5 MG/1
TABLET ORAL
Qty: 70 TABLET | Refills: 1 | Status: SHIPPED | OUTPATIENT
Start: 2024-12-30

## 2024-12-30 NOTE — TELEPHONE ENCOUNTER
ANTICOAGULATION MANAGEMENT:  Medication Refill    Anticoagulation Summary  As of 12/18/2024      Warfarin maintenance plan:  1.25 mg (2.5 mg x 0.5) every Sun, Tue, Thu; 2.5 mg (2.5 mg x 1) all other days   Next INR check:  1/22/2025   Target end date:  Indefinite    Indications    Atrial fibrillation (H) [I48.91]  Long term current use of anticoagulant therapy [Z79.01]  Chronic atrial fibrillation (H) [I48.20]                 Anticoagulation Care Providers       Provider Role Specialty Phone number    Katie Martino MD Referring Family Medicine [phone]    Brianna Matute APRN CNP Referring Family Medicine [phone]    Myrna Clark MD Referring [facility] Medicine [phone]            Refill Criteria    Visit with referring provider/group: Meets criteria: visit within referring provider group in the last 15 months on 11/18/24    [facility] referral last signed: 03/13/2024; within last year:  Yes    Lab monitoring not exceeding 2 weeks overdue: Yes    Antoine meets all criteria for refill. Rx instructions and quantity match patient's current dosing plan. Warfarin 90 day supply with 1 refill granted per [facility] protocol     Maria Victoria Castaneda RN  Anticoagulation Clinic

## 2024-12-30 NOTE — Clinical Note
Sadi Ashton plans to call you in 2 weeks after Jardiance dose increase I placed a future BMP for 1 month. Can you make sure to monitor for PAD changes while on SGLT2?  Ivette Bell, PharmD, BCACP Medication Therapy Management Pharmacist

## 2024-12-30 NOTE — PATIENT INSTRUCTIONS
"Recommendations from today's MTM visit:                                                    MTM (medication therapy management) is a service provided by a clinical pharmacist designed to help you get the most of out of your medicines.   Today we reviewed what your medicines are for, how to know if they are working, that your medicines are safe and how to make your medicine regimen as easy as possible.      When run out of Jardiance 10mg tablets, then increase to Jardiance 25mg daily and follow-up with Ruthann as directed.  Plan on rechecking lab in 1 month after Jardiance dose increase. Placed a future order for basic metabolic panel and can draw at INR appointment around that time.    Follow-up: Return in about 3 weeks (around 1/20/2025) for Diabetes Education, via phone.    It was great speaking with you today.  I value your experience and would be very thankful for your time in providing feedback in our clinic survey. In the next few days, you may receive an email or text message from [employer] with a link to a survey related to your  clinical pharmacist.\"     To schedule another MTM appointment, please call the clinic directly or you may call the MTM scheduling line at [phone] or toll-free at [phone].     My Clinical Pharmacist's contact information:                                                      Please feel free to contact me with any questions or concerns you have.      Ivette Bell, PharmD, BCACP  Medication Therapy Management Pharmacist  Voicemail: [phone] (Mon/Wed only)     "

## 2025-01-22 ENCOUNTER — ANTICOAGULATION THERAPY VISIT (OUTPATIENT)
Dept: ANTICOAGULATION | Facility: CLINIC | Age: 80
End: 2025-01-22

## 2025-01-22 ENCOUNTER — LAB (OUTPATIENT)
Dept: LAB | Facility: CLINIC | Age: 80
End: 2025-01-22
Payer: COMMERCIAL

## 2025-01-22 DIAGNOSIS — Z79.01 LONG TERM CURRENT USE OF ANTICOAGULANT THERAPY: ICD-10-CM

## 2025-01-22 DIAGNOSIS — Z79.01 LONG TERM CURRENT USE OF ANTICOAGULANT THERAPY: Chronic | ICD-10-CM

## 2025-01-22 DIAGNOSIS — I48.20 CHRONIC ATRIAL FIBRILLATION (H): ICD-10-CM

## 2025-01-22 DIAGNOSIS — I48.20 CHRONIC ATRIAL FIBRILLATION (H): Primary | Chronic | ICD-10-CM

## 2025-01-22 LAB — INR BLD: 2.7 (ref 0.9–1.1)

## 2025-01-22 PROCEDURE — 85610 PROTHROMBIN TIME: CPT

## 2025-01-22 PROCEDURE — 36416 COLLJ CAPILLARY BLOOD SPEC: CPT

## 2025-01-22 NOTE — PROGRESS NOTES
ANTICOAGULATION MANAGEMENT     Antoine Russo 79 year old male is on warfarin with therapeutic INR result. (Goal INR 2.0-3.0)    Recent labs: (last 7 days)     01/22/25  0701   INR 2.7*       ASSESSMENT     Source(s): Chart Review and Patient/Caregiver Call     Warfarin doses taken: Warfarin taken as instructed  Diet: No new diet changes identified  Medication/supplement changes: None noted  New illness, injury, or hospitalization: No  Signs or symptoms of bleeding or clotting: No  Previous result: Therapeutic last 2(+) visits  Additional findings: None       PLAN     Recommended plan for no diet, medication or health factor changes affecting INR     Dosing Instructions: Continue your current warfarin dose with next INR in 6 weeks       Summary  As of 1/22/2025      Full warfarin instructions:  1.25 mg every Sun, Tue, Thu; 2.5 mg all other days   Next INR check:  3/5/2025               Telephone call with Poli or Saleem who verbalizes understanding and agrees to plan    Lab visit scheduled    Education provided: Goal range and lab monitoring: goal range and significance of current result and Importance of therapeutic range    Plan made per [facility] anticoagulation protocol    Michael Alvarez RN  1/22/2025  Anticoagulation Clinic  [employer] for routing messages: christos [facility] patient phone line: [phone]        _______________________________________________________________________     Anticoagulation Episode Summary       Current INR goal:  2.0-3.0   TTR:  74.6% (1 y)   Target end date:  Indefinite   Send INR reminders to:  [facility]    Indications    Atrial fibrillation (H) [I48.91]  Long term current use of anticoagulant therapy [Z79.01]  Chronic atrial fibrillation (H) [I48.20]             Comments:  --             Anticoagulation Care Providers       Provider Role Specialty Phone number    Katie Martino MD Referring Family Medicine [phone]    Brianna Matute APRN CNP  Referring Family Medicine [phone]    Myrna Clark MD Referring Family Medicine [phone]

## 2025-02-04 ENCOUNTER — TELEPHONE (OUTPATIENT)
Dept: EDUCATION SERVICES | Facility: CLINIC | Age: 80
End: 2025-02-04
Payer: COMMERCIAL

## 2025-02-04 NOTE — TELEPHONE ENCOUNTER
Diabetes education contact:  Jardiance 25 mg dose, reminded him to come in for labwork.  He will increase his lantus to 52 untis and watch number, if still higher in am will increase to 54.  He will call me in two weeks to assess.    Ruthann Alaniz RD, [facility]  Any diabetes medication initiation or dose changes were made via the [facility] Standing Orders per the patient's referring provider. A copy of this encounter was shared with the provider.

## 2025-02-11 NOTE — PATIENT INSTRUCTIONS
Adeel Schultz,    Thank you for entrusting your care with us today. After your visit today with ROSELYN Carrizales this is the plan that was discussed at your appointment.    Follow-up in 6 months with ultrasound prior.    We will call you closer to that date to schedule.    I am including additional information on these things and our contact information if you have any questions or concerns.   Please do not hesitate to reach out to us if you felt we did not answer your questions or you are unsure of the treatment plan after your visit today. Our number is [phone].Thank you for trusting us with your care.         Again thank you for your time.     Ankle-Brachial Index (ERIN) or Physiologic Test    Description  An ankle-brachial index test is relatively pain free. Blood pressure cuffs of various sizes are placed on your thigh, calf, foot and toes.  Similar to having your blood pressure checked with an arm cuff, as the technician inflates the cuffs, they progressively tighten and are then quickly released.  You may feel some discomfort, but generally for less than 60 seconds for each measurement. You will be asked to remove your socks and shoes and possibly your pants or shorts. Gowns will be provided. It usually takes about 30-60 minutes.   Depending on the initial readings and patient symptoms, you may be asked to perform a light walk on a treadmill.  The technician will apply ultrasound gel, usually warmed for your comfort, to your ankles and wrists. Through the gel, the technician will use a small hand-held device that emits sound waves.  Risks  There are typically no side effects or complications associated with a physiologic study.  How to Prepare  Eat and take medications as usual.  There is no preparation required for an ankle-brachial index (ERIN) or physiologic exam.  What Can I Expect After the Test?  The technician will send the ultrasound images to your vascular surgeon for evaluation. Typically, a  report is available in 2-3 days. If anything critical is found, it is standard practice to notify the vascular surgeon immediately.  Reference: [email or web address]/patient-resources/vascular-tests  Lower Extremity Arterial Ultrasound    Description  Ultrasound examinations are painless and easy for the patient. The vascular laboratory will contain a bed and just two or three pieces of equipment. You will be asked to remove pants or shorts and gowns will be provided. It usually takes about 30 minutes.  The technician will tuck a towel under your underpants in the groin. The gel is water-soluble and will not stain your skin or clothes.  Ultrasound gel, usually warmed for your comfort, will be placed on the inner side of your legs.    Through the gel, the technician will apply to your legs a small hand-held device that emits sound waves.  When the test is completed, the technician will remove excess gel from your legs.    Risks  There are typically no side effects or complications associated with a lower extremity arterial duplex ultrasound.  How to Prepare  Eat and take medications as usual.  There is no preparation required for a lower extremity arterial duplex ultrasound.  What Can I Expect After the Test?  The technician will send the ultrasound images to your vascular surgeon for evaluation. Typically, a report is available in 2-3 days. If anything critical is found, it is standard practice to notify the vascular surgeon immediately.  Reference: [email or web address].[email or web address]/patient-resources/vascular-tests

## 2025-02-18 ENCOUNTER — HOSPITAL ENCOUNTER (OUTPATIENT)
Dept: ULTRASOUND IMAGING | Facility: CLINIC | Age: 80
Discharge: HOME | End: 2025-02-18
Attending: PHYSICIAN ASSISTANT
Payer: COMMERCIAL

## 2025-02-18 ENCOUNTER — OFFICE VISIT (OUTPATIENT)
Dept: VASCULAR SURGERY | Facility: CLINIC | Age: 80
End: 2025-02-18
Attending: PHYSICIAN ASSISTANT
Payer: COMMERCIAL

## 2025-02-18 VITALS
DIASTOLIC BLOOD PRESSURE: 76 MMHG | HEART RATE: 87 BPM | BODY MASS INDEX: 30.51 KG/M2 | SYSTOLIC BLOOD PRESSURE: 128 MMHG | WEIGHT: 206 LBS | TEMPERATURE: 97.4 F | HEIGHT: 69 IN | OXYGEN SATURATION: 93 %

## 2025-02-18 DIAGNOSIS — I73.9 PAD (PERIPHERAL ARTERY DISEASE): Primary | ICD-10-CM

## 2025-02-18 DIAGNOSIS — I73.9 PAD (PERIPHERAL ARTERY DISEASE): ICD-10-CM

## 2025-02-18 PROCEDURE — 99213 OFFICE O/P EST LOW 20 MIN: CPT | Performed by: PHYSICIAN ASSISTANT

## 2025-02-18 PROCEDURE — 93924 LWR XTR VASC STDY BILAT: CPT

## 2025-02-18 ASSESSMENT — PAIN SCALES - GENERAL: PAINLEVEL_OUTOF10: NO PAIN (0)

## 2025-02-18 NOTE — PROGRESS NOTES
VASCULAR SURGERY PROGRESS NOTE    LOCATION:  [facility]     Antoine Russo  Medical Record #: [id number]  YOB: 1945  Age: 79 year old     Date of Service: 2/18/2025    PRIMARY CARE PROVIDER: Myrna Clark    Reason for visit: Surveillance of PAD     IMPRESSION: 79 year-old male presenting for surveillance of peripheral arterial disease. ABIs today have decreased slightly, now 0.55 on the right and 0.67 on the left with toe pressures adequate for wound healing bilaterally.  Patient experiencing lower extremity claudication that is not lifestyle limiting.  Denies any rest pain or nonhealing wounds.  He is medically optimized.    RECOMMENDATION/RISKS: Continue best medical management with warfarin and statin. Encouraged regular activity/ambulation. Follow-up in 6 months with repeat ERIN studies.     HPI:  Antoine Russo is a 79 year old male with past medical history significant for hypertension, hyperlipidemia, type 2 diabetes mellitus, coronary artery disease, atrial fibrillation, chronic kidney disease stage II, low back pain, and peripheral arterial disease with lifestyle limiting claudication. Patient underwent left lower extremity angiogram with treatment of SFA disease in September 2022. He was last seen by the vascular team 6 months ago and was noted to have relatively stable ABIs with non lifestyle limiting claudication and neuropathy in the lower extremities.    Today, Mr. Russo presents for follow-up.  Patient continues to experience lower extremity claudication but does not feel like it is lifestyle-limiting at this time.  He is able to stop and take breaks and then keep going.  He denies any pain at rest aside from his neuropathy.  Has a small wound on his foot after clipping his toenails that is healing appropriately with time.  Patient is compliant with his medications.    Ultrasound results were discussed and all questions answered.  No other  concerns.    REVIEW OF SYSTEMS:    A 12 point ROS was reviewed and is negative except for what is listed above in HPI.    PHH:    Past Medical History:   Diagnosis Date    Acute gastritis 10/3/2020    Acute kidney injury 04/17/2019    Acute on chronic pancreatitis (H) 01/23/2018    Acute pancreatitis 10/21/2018    Acute recurrent pancreatitis 10/17/2020    Acute right-sided low back pain with left-sided sciatica 2/4/2020    Bacteriuria with pyuria 04/17/2019    C. difficile colitis     Chronic atrial fibrillation (H)     On chronic anticoagulation with coumadin    Colon polyp 08/26/2011    Colonoscopy 8/2011-A sessile polyp was found in the cecum. The polyp was 6 mm in size. The polyp was removed with a hot snare. Resection and retrieval were complete. A sessile polyp was found in the proximal transverse colon. The polyp was 15 mm in size. The polyp was removed with a hot snare. Resection and retrieval were complete. A sessile polyp was found in the sigmoid colon. The polyp was 5 mm    Dehydration 2/16/2021    Diabetes mellitus (H)     type 2    Groin fluid collection 12/15/2012    CT 12/12- New (since 5/11) collection measuring at least 2.3 cm in diameter and 6.5 cm in length within the right inguinal canal. 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, N0, M0 carcinoma, underwent transcervical glossectomy, floor of mouth excision, BL neck dissection, adj radiation, and skin flap reconstruction    Pancreatic duct leak 5/3/2016    Recurrent pancreatitis     alcoholic pancreatitis      Past Surgical History:   Procedure Laterality Date    BACK SURGERY      BREAST SURGERY  01/01/2008    right breast mass benign    COLECTOMY SUBTOTAL  2013    With diverting ostomy creation; done for toxic C difficile colitis    COLONOSCOPY      multiple polyps removed    COLONOSCOPY  08/24/2011    TUMOR/POLYP/LESION BY SNARE    COLONOSCOPY   12/17/2012    COLONOSCOPY    COLONOSCOPY  12/18/2012    COLONOSCOPY    DENERVATION OF SPERMATIC CORD MICROSURGICAL Left 05/23/2017    Procedure: DENERVATION OF SPERMATIC CORD MICROSURGICAL;;  Surgeon: Marcio Aggarwal MD;  [facility]    DISSECTION RADICAL NECK BILATERAL  08/02/2012    Procedure: DISSECTION RADICAL NECK BILATERAL;;  Surgeon: Yung Alvares MD;  [facility]: [facility]    ENDOSCOPIC RETROGRADE CHOLANGIOPANCREATOGRAM N/A 05/10/2016    Procedure: COMBINED ENDOSCOPIC RETROGRADE CHOLANGIOPANCREATOGRAPHY, PLACE TUBE/STENT;  Surgeon: Yovanny Beasley MD;  [facility]    ENDOSCOPIC RETROGRADE CHOLANGIOPANCREATOGRAM N/A 03/29/2018    Procedure: ENDOSCOPIC RETROGRADE CHOLANGIOPANCREATOGRAM;  Endoscopic Retrograde Cholangiopancreatogram, Endoscopic Ultrasound, Biliary Sphincterotomy, Biliary and Pancreatic Stent Placement;  Surgeon: Yovanny Beasley MD;  [facility]    ENDOSCOPIC ULTRASOUND UPPER GASTROINTESTINAL TRACT (GI) N/A 02/03/2016    Procedure: ENDOSCOPIC ULTRASOUND, ESOPHAGOSCOPY / UPPER GASTROINTESTINAL TRACT (GI);  Surgeon: Grabiel Plata MD;  [facility]    ENDOSCOPIC ULTRASOUND UPPER GASTROINTESTINAL TRACT (GI) N/A 03/29/2018    Procedure: ENDOSCOPIC ULTRASOUND, ESOPHAGOSCOPY / UPPER GASTROINTESTINAL TRACT (GI);;  Surgeon: Grabiel Plata MD;  [facility]    ESOPHAGOSCOPY, GASTROSCOPY, DUODENOSCOPY (EGD), COMBINED N/A 02/03/2016    Procedure: COMBINED ENDOSCOPIC ULTRASOUND, ESOPHAGOSCOPY, GASTROSCOPY, DUODENOSCOPY (EGD), FINE NEEDLE ASPIRATE/BIOPSY;  Surgeon: Grabiel Plata MD;  [facility]    ESOPHAGOSCOPY, GASTROSCOPY, DUODENOSCOPY (EGD), COMBINED N/A 06/08/2016    Procedure: COMBINED ESOPHAGOSCOPY, GASTROSCOPY, DUODENOSCOPY (EGD), REMOVE FOREIGN BODY;  Surgeon: Yovanny Beasley MD;  [facility]    ESOPHAGOSCOPY, GASTROSCOPY, DUODENOSCOPY (EGD), COMBINED N/A 04/17/2018    Procedure: COMBINED ESOPHAGOSCOPY, GASTROSCOPY,  DUODENOSCOPY (EGD), REMOVE FOREIGN BODY;  EGD with stent removal;  Surgeon: Grabiel Plata MD;  [facility]    ESOPHAGOSCOPY, GASTROSCOPY, DUODENOSCOPY (EGD), COMBINED N/A 10/26/2022    Procedure: ESOPHAGOGASTRODUODENOSCOPY, WITH BIOPSY;  Surgeon: Jonatan Celeste MD;  Location: [facility]    EXCISE LESION INTRAORAL  06/14/2012    Procedure: EXCISE LESION INTRAORAL;  Wide Local Excision Floor of Mouth, Direct Laryngoscopy, Bilateral Ida's Marsuplization, Split Thickness Skin Graft from right Thigh  Latex Safe;  Surgeon: Gerson Ravi MD;  [facility]    EXCISE LESION INTRAORAL  08/02/2012    Procedure: EXCISE LESION INTRAORAL;  Floor of Mouth Resection, Bilateral Selective Radical Neck Dissection, Tracheostomy, Left Radial Forearm  Free Flap with Alloderm, Nasogastric Feeding Tube Placement,    * Latex Safe*;  Surgeon: Gerson Ravi MD;  [facility]    EXCISE LESION INTRAORAL  12/11/2012    Procedure: EXCISE LESION INTRAORAL;  takedown of oral flap;  Surgeon: Yung Alvares MD;  [facility]    GRAFT FREE VASCULARIZED (LOCATION)  08/02/2012    Procedure: GRAFT FREE VASCULARIZED (LOCATION);;  Surgeon: Yung Alvares MD;  [facility]    GRAFT SKIN SPLIT THICKNESS FROM EXTREMITY  06/14/2012    Procedure: GRAFT SKIN SPLIT THICKNESS FROM EXTREMITY;;  Surgeon: Gerson Ravi MD;  [facility]    IR LOWER EXTREMITY ANGIOGRAM LEFT  09/30/2022    LAPAROSCOPIC ILEOSTOMY TAKEDOWN  06/06/2013    LAPAROSCOPIC ILEOSTOMY TAKEDOWN    LAPAROTOMY EXPLORATORY  12/20/2012    LAPAROTOMY EXPLORATORY with Colectomy    LARYNGOSCOPY  06/14/2012    Procedure: LARYNGOSCOPY;;  Surgeon: Gerson Ravi MD;  [facility]    ORTHOPEDIC SURGERY      ganglian cyst left ankle    PANCREATECTOMY, SPLENECTOMY N/A 03/10/2016    Procedure: PANCREATECTOMY, SPLENECTOMY;  Surgeon: Nael Abel MD;  [facility]    SHOULDER SURGERY  2006, 2008    2006- right rotator cuff, 2008 bone spur on left. Dr. Hdez     VARICOCELECTOMY Left 05/23/2017    Procedure: VARICOCELECTOMY;  Left Varicocele Repair, Denervation of Left Testis;  Surgeon: Marcio Aggarwal MD;  Location:  OR     ALLERGIES:  Actos [pioglitazone] and Metformin    MEDS:    Current Outpatient Medications:     acetaminophen (TYLENOL) 500 MG tablet, Take 1,000 mg by mouth every 8 hours as needed for pain., Disp: , Rfl:     atorvastatin (LIPITOR) 80 MG tablet, Take 1 tablet (80 mg) by mouth daily., Disp: 90 tablet, Rfl: 3    Continuous Glucose Sensor (FREESTYLE LISA 3 PLUS SENSOR) MISC, Change every 15 days., Disp: 6 each, Rfl: 3    cyclobenzaprine (FLEXERIL) 5 MG tablet, Take 1 tablet (5 mg) by mouth 3 times daily as needed for muscle spasms., Disp: 15 tablet, Rfl: 0    empagliflozin (JARDIANCE) 10 MG TABS tablet, Take 1 tablet (10 mg) by mouth daily., Disp: 90 tablet, Rfl: 3    furosemide (LASIX) 20 MG tablet, TAKE 1 TABLET BY MOUTH ONCE DAILY AS NEEDED, Disp: 30 tablet, Rfl: 0    insulin glargine (LANTUS PEN) 100 UNIT/ML pen, Inject 50 Units subcutaneously at bedtime. Increase by 2 units every 3 days until blood sugars in am under 120 up to max 60 units daily, Disp: 60 mL, Rfl: 3    insulin pen needle (BD LUIS U/F) 32G X 4 MM miscellaneous, USE PEN NEEDLE ONCE DAILY AS DIRECTED, Disp: 60 each, Rfl: 0    lisinopril (ZESTRIL) 20 MG tablet, Take 1 tablet (20 mg) by mouth daily., Disp: 90 tablet, Rfl: 3    metoprolol succinate ER (TOPROL XL) 50 MG 24 hr tablet, Take 1.5 tablets (75 mg) by mouth 2 times daily., Disp: 270 tablet, Rfl: 3    multivitamin, therapeutic with minerals (THERA-VIT-M) TABS, Take 1 tablet by mouth daily., Disp: 30 each, Rfl: 1    pregabalin (LYRICA) 150 MG capsule, Take 1 capsule (150 mg) by mouth 2 times daily., Disp: 180 capsule, Rfl: 3    tamsulosin (FLOMAX) 0.4 MG capsule, Take 1 capsule (0.4 mg) by mouth daily., Disp: 90 capsule, Rfl: 3    vitamin B-12 (CYANOCOBALAMIN) 100 MCG tablet, Take 1 tablet by mouth daily., Disp: , Rfl:      "Vitamin D3 (CHOLECALCIFEROL) 25 mcg (1000 units) tablet, Take 1 tablet by mouth daily, Disp: , Rfl:     warfarin ANTICOAGULANT (COUMADIN) 2.5 MG tablet, Take 0.5 tab on Sun/Tues/Thurs and 1 tablet all other days,  or As directed by Anticoagulation clinic, Disp: 70 tablet, Rfl: 1    diclofenac (VOLTAREN) 1 % topical gel, Apply 2 g topically 4 times daily. Apply to left neck/shoulder area (Patient not taking: Reported on 2025), Disp: 50 g, Rfl: 2    empagliflozin (JARDIANCE) 25 MG TABS tablet, Take 1 tablet (25 mg) by mouth daily. (Patient not taking: Reported on 2024), Disp: 30 tablet, Rfl: 5    SOCIAL HABITS:    History   Smoking Status    Former    Types: Cigarettes   Smokeless Tobacco    Never     Social History    Substance and Sexual Activity      Alcohol use: Not Currently      History   Drug Use No     FAMILY HISTORY:    Family History   Problem Relation Age of Onset    Diabetes Sister         onset age 50    Alzheimer Disease Mother          80    Alzheimer Disease Father          85    Diabetes Other         nephew type 1    Diabetes Other     Aneurysm Sister     Anesthesia Reaction No family hx of     Colon Cancer No family hx of     Colon Polyps No family hx of     Crohn's Disease No family hx of     Ulcerative Colitis No family hx of      PE:  /76   Pulse 87   Temp 97.4  F (36.3  C) (Tympanic)   Ht 1.753 m (5' 9\")   Wt 93.4 kg (206 lb)   SpO2 93%   BMI 30.42 kg/m    Wt Readings from Last 1 Encounters:   25 93.4 kg (206 lb)     Body mass index is 30.42 kg/m .    EXAM:  GENERAL: well-developed 79 year old male who appears his stated age  CARDIAC: normal   CHEST/LUNG: normal respiratory effort   MUSCULOSKELETAL: grossly normal and both lower extremities are intact, no lower extremity edema  NEUROLOGIC: focally intact, alert and oriented x 3  PSYCH: appropriate affect    DIAGNOSTIC STUDIES:     Images:    US ERIN with PPG with Exercise     IMPRESSION:  1.  RIGHT LOWER " EXTREMITY: ERIN at rest demonstrates moderate arterial insufficiency. ERIN progresses to severe arterial insufficiency with exercise. Findings consistent with significant peripheral arterial disease. Recommend vascular consultation.     2.  LEFT LOWER EXTREMITY: ERIN at rest demonstrate moderate arterial insufficiency with normal response exercise.    LABS:      Sodium   Date Value Ref Range Status   02/14/2025 139 135 - 145 mmol/L Final   08/05/2024 139 135 - 145 mmol/L Final   05/20/2024 139 135 - 145 mmol/L Final     Comment:     Reference intervals for this test were updated on 09/26/2023 to more accurately reflect our healthy population. There may be differences in the flagging of prior results with similar values performed with this method. Interpretation of those prior results can be made in the context of the updated reference intervals.    02/22/2021 136 133 - 144 mmol/L Final   02/18/2021 138 133 - 144 mmol/L Final   02/17/2021 137 133 - 144 mmol/L Final     Urea Nitrogen   Date Value Ref Range Status   02/14/2025 20.1 8.0 - 23.0 mg/dL Final   08/05/2024 14.6 8.0 - 23.0 mg/dL Final   05/20/2024 14.2 8.0 - 23.0 mg/dL Final   07/19/2022 16 7 - 30 mg/dL Final   05/24/2022 14 7 - 30 mg/dL Final   04/15/2022 17 7 - 30 mg/dL Final   02/22/2021 13 7 - 30 mg/dL Final   02/18/2021 12 7 - 30 mg/dL Final   02/17/2021 13 7 - 30 mg/dL Final     Hemoglobin   Date Value Ref Range Status   05/20/2024 15.4 13.3 - 17.7 g/dL Final   08/15/2023 14.9 13.3 - 17.7 g/dL Final   07/13/2023 15.5 13.3 - 17.7 g/dL Final   02/22/2021 14.0 13.3 - 17.7 g/dL Final   02/18/2021 13.8 13.3 - 17.7 g/dL Final   02/17/2021 13.9 13.3 - 17.7 g/dL Final     Platelet Count   Date Value Ref Range Status   05/20/2024 142 (L) 150 - 450 10e3/uL Final   08/15/2023 157 150 - 450 10e3/uL Final   07/13/2023 143 (L) 150 - 450 10e3/uL Final   02/22/2021 246 150 - 450 10e9/L Final   02/18/2021 193 150 - 450 10e9/L Final   02/17/2021 192 150 - 450 10e9/L Final      INR   Date Value Ref Range Status   02/14/2025 2.2 (H) 0.9 - 1.1 Final   01/22/2025 2.7 (H) 0.9 - 1.1 Final   12/18/2024 2.0 (H) 0.9 - 1.1 Final   07/13/2023 2.16 (H) 0.85 - 1.15 Final   05/11/2023 2.06 (H) 0.85 - 1.15 Final   10/31/2022 1.30 (H) 0.85 - 1.15 Final   06/09/2021 2.50 (H) 0.86 - 1.14 Final     Comment:     This test is intended for monitoring Coumadin therapy.  Results are not   accurate in patients with prolonged INR due to factor deficiency.     05/05/2021 2.70 (H) 0.86 - 1.14 Final     Comment:     This test is intended for monitoring Coumadin therapy.  Results are not   accurate in patients with prolonged INR due to factor deficiency.     04/07/2021 2.70 (H) 0.86 - 1.14 Final     Comment:     This test is intended for monitoring Coumadin therapy.  Results are not   accurate in patients with prolonged INR due to factor deficiency.       25 minutes spent on the day of encounter doing chart review, history and exam, documentation, and further activities as noted.     Domonique Carrizales PA-C  VASCULAR SURGERY

## 2025-02-24 ENCOUNTER — ALLIED HEALTH/NURSE VISIT (OUTPATIENT)
Dept: EDUCATION SERVICES | Facility: CLINIC | Age: 80
End: 2025-02-24
Payer: COMMERCIAL

## 2025-02-24 DIAGNOSIS — E11.40 TYPE 2 DIABETES MELLITUS WITH DIABETIC NEUROPATHY, WITH LONG-TERM CURRENT USE OF INSULIN (H): Primary | ICD-10-CM

## 2025-02-24 DIAGNOSIS — Z79.4 TYPE 2 DIABETES MELLITUS WITH DIABETIC NEUROPATHY, WITH LONG-TERM CURRENT USE OF INSULIN (H): Primary | ICD-10-CM

## 2025-02-24 PROCEDURE — G0108 DIAB MANAGE TRN  PER INDIV: HCPCS | Performed by: DIETITIAN, REGISTERED

## 2025-02-24 NOTE — PROGRESS NOTES
Diabetes Self-Management Education & Support    Presents for: Individual review    Type of Service: In Person Visit      Assessment  -numbers looking better, is having some lows.  Dropped the lantus down to 50 units.  On the weekends he is going to do 46 units (he was not taking the lantus if his BG was under 120 when he tested it at suppertime).  -the jardiance is helping at 25 mg dose  He will call me if he is having more lows  -will see again in two months and test a new a1c    Patient's most recent   Lab Results   Component Value Date    A1C 8.4 11/18/2024    A1C 7.1 10/03/2020     is not meeting goal of <7.0    Diabetes knowledge and skills assessment:   Patient is knowledgeable in diabetes management concepts related to: Healthy Eating, Being Active, and Monitoring    Based on learning assessment above, most appropriate setting for further diabetes education would be: Individual setting.    Care Plan and Education Provided:  Taking Medication: When to take medication(s)    Patient verbalized understanding of diabetes self-management education concepts discussed, opportunities for ongoing education and support, and recommendations provided today.    Plan  Drop your Lantus down to 46 on Friday and Saturday night.  Keep the other nights the same at 50 units.      2.   Let me know if you are having more lows.  Ruthann [phone].    3.  Keep taking the Jardiance 25 mg daily.    Follow-up:  Upcoming Diabetes Ed Appointments     Visit Type Date Time Department    DIABETES ED 2/24/2025  7:00 AM [facility] DIABETES ED        See Care Plan for co-developed, patient-state behavior change goals.    Education Materials Provided:  No new materials provided today      Subjective/Objective  Poli or Saleem is an 79 year old year old, presenting for the following diabetes education related to: Presents for: Individual review  Diabetes education in the past 24mo: No  Diabetes type: Type 2  Disease course: Stable  How confident are you  "filling out medical forms by yourself:: Extremely  Cultural Influences/Ethnic Background:  Not  or       Diabetes Symptoms & Complications:  Diabetes Related Symptoms: Neuropathy  Weight trend: Stable  Symptom course: Stable  Disease course: Stable       Patient Problem List and Family Medical History reviewed for relevant medical history, current medical status, and diabetes risk factors.    Vitals:  There were no vitals taken for this visit.  Estimated body mass index is 30.42 kg/m  as calculated from the following:    Height as of 2/18/25: 1.753 m (5' 9\").    Weight as of 2/18/25: 93.4 kg (206 lb).   Last 3 BP:   BP Readings from Last 3 Encounters:   02/18/25 128/76   12/30/24 121/72   12/14/24 (!) 152/75       History   Smoking Status    Former    Types: Cigarettes   Smokeless Tobacco    Never       Labs:  Lab Results   Component Value Date    A1C 8.4 11/18/2024    A1C 7.1 10/03/2020     Lab Results   Component Value Date     02/14/2025     11/03/2022     07/19/2022     02/22/2021     Lab Results   Component Value Date    LDL 59 08/05/2024    LDL 54 02/22/2021     HDL Cholesterol   Date Value Ref Range Status   02/22/2021 29 (L) >39 mg/dL Final     Direct Measure HDL   Date Value Ref Range Status   08/05/2024 35 (L) >=40 mg/dL Final   ]  GFR Estimate   Date Value Ref Range Status   02/14/2025 70 >60 mL/min/1.73m2 Final     Comment:     eGFR calculated using 2021 CKD-EPI equation.   02/22/2021 65 >60 mL/min/[1.73_m2] Final     Comment:     Non  GFR Calc  Starting 12/18/2018, serum creatinine based estimated GFR (eGFR) will be   calculated using the Chronic Kidney Disease Epidemiology Collaboration   (CKD-EPI) equation.       GFR, ESTIMATED POCT   Date Value Ref Range Status   09/13/2022 >60 >60 mL/min/1.73m2 Final     GFR Estimate If Black   Date Value Ref Range Status   02/22/2021 76 >60 mL/min/[1.73_m2] Final     Comment:      GFR " "Calc  Starting 12/18/2018, serum creatinine based estimated GFR (eGFR) will be   calculated using the Chronic Kidney Disease Epidemiology Collaboration   (CKD-EPI) equation.       Lab Results   Component Value Date    CR 1.09 02/14/2025    CR 1.11 02/22/2021     No results found for: \"MICROALBUMIN\"    Healthy Eating:  Healthy Eating Assessed Today: Yes  Cultural/Islam diet restrictions?: No  How many times a week on average do you eat food made away from home (restaurant/take-out)?: 5+  Meals include: Dinner  Breakfast: breakfast bowls  Lunch: sometimes nothing or small skewers at [employer]  Dinner: pizza last night, miki with just a few fries.  has been really trying to avoid the fries.  Other: has dipped some on weekend days, he tends to be more active on the weekends.  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  Will have him drop his lantus dose down to 46 on friday and saturday nights.  Beverages: Water, Coffee, Alcohol    Being Active:  Being Active Assessed Today:  (cant do much, has PAD)  Barrier to exercise: Physical limitation    Monitoring:  Blood Glucose Meter: FreeStyle  Times checking blood sugar at home (number): 3  Times checking blood sugar at home (per): Day        Ruthann Alaniz RD  Time Spent: 30 minutes  Encounter Type: Individual    Any diabetes medication dose changes were made via the CDCES Standing Orders under the patient's referring provider.  Answers submitted by the patient for this visit:  Questionnaire about: Diabetes problem (Submitted on 2/20/2025)  Chief Complaint: Diabetes problem    "

## 2025-02-24 NOTE — PATIENT INSTRUCTIONS
Drop your Lantus down to 46 on Friday and Saturday night.  Keep the other nights the same at 50 units.      2.   Let me know if you are having more lows.  Ruthann [phone].    3.  Keep taking the Jardiance 25 mg daily.

## 2025-04-16 ENCOUNTER — LAB (OUTPATIENT)
Dept: LAB | Facility: CLINIC | Age: 80
End: 2025-04-16
Payer: COMMERCIAL

## 2025-04-16 ENCOUNTER — ANTICOAGULATION THERAPY VISIT (OUTPATIENT)
Dept: ANTICOAGULATION | Facility: CLINIC | Age: 80
End: 2025-04-16

## 2025-04-16 DIAGNOSIS — I48.20 CHRONIC ATRIAL FIBRILLATION (H): ICD-10-CM

## 2025-04-16 DIAGNOSIS — Z79.01 LONG TERM CURRENT USE OF ANTICOAGULANT THERAPY: Primary | Chronic | ICD-10-CM

## 2025-04-16 DIAGNOSIS — Z79.01 LONG TERM CURRENT USE OF ANTICOAGULANT THERAPY: ICD-10-CM

## 2025-04-16 LAB — INR BLD: 4.4 (ref 0.9–1.1)

## 2025-04-16 PROCEDURE — 85610 PROTHROMBIN TIME: CPT

## 2025-04-16 PROCEDURE — 36416 COLLJ CAPILLARY BLOOD SPEC: CPT

## 2025-04-16 NOTE — PROGRESS NOTES
ANTICOAGULATION MANAGEMENT     Antoine Russo 79 year old male is on warfarin with supratherapeutic INR result. (Goal INR 2.0-3.0)    Recent labs: (last 7 days)     04/16/25  0717   INR 4.4*       ASSESSMENT     Source(s): Chart Review and Patient/Caregiver Call     Warfarin doses taken: Warfarin taken as instructed  Diet: No new diet changes identified  Medication/supplement changes:  Yes   Poli will stop taking Berberine supplement today.  (It was to help lower his A1C).   Reported he started 3 weeks ago (before last INR) - Berberine supplement - chemical compound found in plants. (Berberine has the potential to increase the effects of warfarin, leading to an increased risk of bleeding.  Monitor INR closely).  New illness, injury, or hospitalization: No  Signs or symptoms of bleeding or clotting: No  Previous result: Supratherapeutic at 3.2 on 3/28/25.  Additional findings: None       PLAN     Recommended plan for temporary change(s) affecting INR     Dosing Instructions:  - he has taken 2.5mg warfarin dose this morning,  - advised to hold 2 days warfarin doses, 4/17-18  - then continue your current warfarin dose with next INR in 10 days       Summary  As of 4/16/2025      Full warfarin instructions:  4/17: Hold; 4/18: Hold; Otherwise 2.5 mg every Mon, Wed, Fri; 1.25 mg all other days   Next INR check:  4/25/2025               Telephone call with Poli who verbalizes understanding and agrees to plan   - requested to send [employer] message, as he is not at home.    Lab visit scheduled - INR on 4/25/25 @ [facility].    Education provided: Taking warfarin: Importance of taking warfarin as instructed  Goal range and lab monitoring: goal range and significance of current result  Interaction IS anticipated between warfarin and OTC Berberine supplement  Symptom monitoring: monitoring for bleeding signs and symptoms, when to seek medical attention/emergency care, and if you hit your head or have a bad fall seek emergency  care    Plan made per [facility] anticoagulation protocol    Nessa Chino RN  4/16/2025  Anticoagulation Clinic  [facility] for routing messages: p [facility]  ACC patient phone line: [phone]        _______________________________________________________________________     Anticoagulation Episode Summary       Current INR goal:  2.0-3.0   TTR:  82.1% (1 y)   Target end date:  Indefinite   Send INR reminders to:  [facility]    Indications    Atrial fibrillation (H) [I48.91]  Long term current use of anticoagulant therapy [Z79.01]  Chronic atrial fibrillation (H) [I48.20]             Comments:  --             Anticoagulation Care Providers       Provider Role Specialty Phone number    Katie Martino MD Referring Family Medicine [phone]    Brianna Matute APRN [facility] Referring Family Medicine [phone]    Myrna Clark MD Referring Family Medicine [phone]

## 2025-04-24 ENCOUNTER — HOSPITAL ENCOUNTER (EMERGENCY)
Facility: CLINIC | Age: 80
Discharge: HOME | End: 2025-04-24
Attending: EMERGENCY MEDICINE
Payer: COMMERCIAL

## 2025-04-24 ENCOUNTER — APPOINTMENT (OUTPATIENT)
Dept: MRI IMAGING | Facility: CLINIC | Age: 80
End: 2025-04-24
Attending: EMERGENCY MEDICINE
Payer: COMMERCIAL

## 2025-04-24 VITALS
TEMPERATURE: 97.7 F | OXYGEN SATURATION: 90 % | RESPIRATION RATE: 18 BRPM | BODY MASS INDEX: 30.81 KG/M2 | HEART RATE: 84 BPM | SYSTOLIC BLOOD PRESSURE: 173 MMHG | HEIGHT: 69 IN | DIASTOLIC BLOOD PRESSURE: 89 MMHG | WEIGHT: 208 LBS

## 2025-04-24 DIAGNOSIS — G56.31 RADIAL NERVE PALSY, RIGHT: ICD-10-CM

## 2025-04-24 DIAGNOSIS — I72.5 BASILAR ARTERY ANEURYSM: ICD-10-CM

## 2025-04-24 LAB
ALBUMIN SERPL BCG-MCNC: 3.8 G/DL (ref 3.5–5.2)
ALP SERPL-CCNC: 108 U/L (ref 40–150)
ALT SERPL W P-5'-P-CCNC: 18 U/L (ref 0–70)
ANION GAP SERPL CALCULATED.3IONS-SCNC: 12 MMOL/L (ref 7–15)
APTT PPP: 46 SECONDS (ref 22–38)
AST SERPL W P-5'-P-CCNC: 23 U/L (ref 0–45)
BASOPHILS # BLD AUTO: 0 10E3/UL (ref 0–0.2)
BASOPHILS NFR BLD AUTO: 0 %
BILIRUB SERPL-MCNC: 0.6 MG/DL
BUN SERPL-MCNC: 19.2 MG/DL (ref 8–23)
CALCIUM SERPL-MCNC: 10.3 MG/DL (ref 8.8–10.4)
CHLORIDE SERPL-SCNC: 99 MMOL/L (ref 98–107)
CREAT SERPL-MCNC: 1.06 MG/DL (ref 0.67–1.17)
EGFRCR SERPLBLD CKD-EPI 2021: 72 ML/MIN/1.73M2
EOSINOPHIL # BLD AUTO: 0.1 10E3/UL (ref 0–0.7)
EOSINOPHIL NFR BLD AUTO: 1 %
ERYTHROCYTE [DISTWIDTH] IN BLOOD BY AUTOMATED COUNT: 17.1 % (ref 10–15)
GLUCOSE BLDC GLUCOMTR-MCNC: 134 MG/DL (ref 70–99)
GLUCOSE SERPL-MCNC: 144 MG/DL (ref 70–99)
HCO3 SERPL-SCNC: 27 MMOL/L (ref 22–29)
HCT VFR BLD AUTO: 49.7 % (ref 40–53)
HGB BLD-MCNC: 16 G/DL (ref 13.3–17.7)
HOLD SPECIMEN: NORMAL
IMM GRANULOCYTES # BLD: 0 10E3/UL
IMM GRANULOCYTES NFR BLD: 0 %
INR PPP: 2.51 (ref 0.85–1.15)
LYMPHOCYTES # BLD AUTO: 2.3 10E3/UL (ref 0.8–5.3)
LYMPHOCYTES NFR BLD AUTO: 26 %
MCH RBC QN AUTO: 32.9 PG (ref 26.5–33)
MCHC RBC AUTO-ENTMCNC: 32.2 G/DL (ref 31.5–36.5)
MCV RBC AUTO: 102 FL (ref 78–100)
MONOCYTES # BLD AUTO: 0.9 10E3/UL (ref 0–1.3)
MONOCYTES NFR BLD AUTO: 10 %
NEUTROPHILS # BLD AUTO: 5.5 10E3/UL (ref 1.6–8.3)
NEUTROPHILS NFR BLD AUTO: 63 %
NRBC # BLD AUTO: 0.1 10E3/UL
NRBC BLD AUTO-RTO: 2 /100
PLATELET # BLD AUTO: 157 10E3/UL (ref 150–450)
POTASSIUM SERPL-SCNC: 4.2 MMOL/L (ref 3.4–5.3)
PROT SERPL-MCNC: 7.5 G/DL (ref 6.4–8.3)
RBC # BLD AUTO: 4.86 10E6/UL (ref 4.4–5.9)
SODIUM SERPL-SCNC: 138 MMOL/L (ref 135–145)
WBC # BLD AUTO: 8.8 10E3/UL (ref 4–11)

## 2025-04-24 PROCEDURE — 99285 EMERGENCY DEPT VISIT HI MDM: CPT | Mod: 25

## 2025-04-24 PROCEDURE — 93005 ELECTROCARDIOGRAM TRACING: CPT

## 2025-04-24 PROCEDURE — 82962 GLUCOSE BLOOD TEST: CPT

## 2025-04-24 PROCEDURE — 255N000002 HC RX 255 OP 636: Performed by: EMERGENCY MEDICINE

## 2025-04-24 PROCEDURE — 70553 MRI BRAIN STEM W/O & W/DYE: CPT

## 2025-04-24 PROCEDURE — A9585 GADOBUTROL INJECTION: HCPCS | Performed by: EMERGENCY MEDICINE

## 2025-04-24 PROCEDURE — 36415 COLL VENOUS BLD VENIPUNCTURE: CPT | Performed by: EMERGENCY MEDICINE

## 2025-04-24 PROCEDURE — 70549 MR ANGIOGRAPH NECK W/O&W/DYE: CPT

## 2025-04-24 PROCEDURE — 99284 EMERGENCY DEPT VISIT MOD MDM: CPT | Performed by: EMERGENCY MEDICINE

## 2025-04-24 PROCEDURE — 82310 ASSAY OF CALCIUM: CPT | Performed by: EMERGENCY MEDICINE

## 2025-04-24 PROCEDURE — 85610 PROTHROMBIN TIME: CPT | Performed by: EMERGENCY MEDICINE

## 2025-04-24 PROCEDURE — 85730 THROMBOPLASTIN TIME PARTIAL: CPT | Performed by: EMERGENCY MEDICINE

## 2025-04-24 PROCEDURE — 70544 MR ANGIOGRAPHY HEAD W/O DYE: CPT

## 2025-04-24 PROCEDURE — 85004 AUTOMATED DIFF WBC COUNT: CPT | Performed by: EMERGENCY MEDICINE

## 2025-04-24 PROCEDURE — 93010 ELECTROCARDIOGRAM REPORT: CPT | Performed by: EMERGENCY MEDICINE

## 2025-04-24 RX ORDER — GADOBUTROL 604.72 MG/ML
10 INJECTION INTRAVENOUS ONCE
Status: COMPLETED | OUTPATIENT
Start: 2025-04-24 | End: 2025-04-24

## 2025-04-24 RX ADMIN — GADOBUTROL 10 ML: 604.72 INJECTION INTRAVENOUS at 20:45

## 2025-04-24 ASSESSMENT — COLUMBIA-SUICIDE SEVERITY RATING SCALE - C-SSRS
6. HAVE YOU EVER DONE ANYTHING, STARTED TO DO ANYTHING, OR PREPARED TO DO ANYTHING TO END YOUR LIFE?: NO
1. IN THE PAST MONTH, HAVE YOU WISHED YOU WERE DEAD OR WISHED YOU COULD GO TO SLEEP AND NOT WAKE UP?: NO
2. HAVE YOU ACTUALLY HAD ANY THOUGHTS OF KILLING YOURSELF IN THE PAST MONTH?: NO

## 2025-04-24 ASSESSMENT — ACTIVITIES OF DAILY LIVING (ADL)
ADLS_ACUITY_SCORE: 58

## 2025-04-24 NOTE — ED NOTES
"  [facility]    Stroke Telephone Note    I was called by Armand Gamboa on 04/24/25 regarding patient Antoine Russo. The patient is a 79 year old male with past medical history for diabetes, HTN, PAD, CAD, hypertension, CKD, atrial fibrillation on warfarin presents with right hand weakness mainly wrist drop. Last known well last night.     Vitals  BP: (!) 156/91   Pulse: 80   Resp: 18   Temp: 97.7  F (36.5  C)   Weight: 94.3 kg (208 lb)    Imaging Findings  CT head: NA  CTA head/neck: na    Impression    R Hand weakness/wrist drop     Recommendations  MRI brain   MRA head and neck     My recommendations are based on the information provided over the phone by Antoine Russo's in-person providers. They are not intended to replace the clinical judgment of his in-person providers. I was not requested to personally see or examine the patient at this time.     The Stroke Staff is Dr. Mayo.    Ramo Sullivan MD  Vascular Neurology Fellow    To page me or covering stroke neurology team member, click here: [facility]  Choose \"On Call\" tab at top, then select \"NEUROLOGY/ALL SITES\" from middle drop-down box, press Enter, then look for \"stroke\" or \"telestroke\" for your site.   "

## 2025-04-24 NOTE — ED PROVIDER NOTES
History     Chief Complaint   Patient presents with    Stroke Symptoms     HPI  Antoine Russo is a 79 year old male with history significant for hypertension, coronary artery disease, hyperlipidemia, atrial fibrillation, diabetes type 2, chronic anticoagulation with warfarin, with weakness in his right hand with onset of symptoms last evening approximately 7 PM.  Patient was working at home on his computer.  He was sitting in his chair and fell asleep.  After waking he had trouble operating the mouse with his right hand.  Did not have his arm over a chair.  No pain in his shoulder, arm, wrist, forearm or hand.  No other symptoms present.  Has not had anything like this before.  No change in his speech, chest pain or pressure.  No constitutional symptoms.  Today he noticed he was unable to sign his name.    The patient's PMHx, Surgical Hx, Allergies, and Medications were all reviewed with the patient.    Allergies:  Allergies   Allergen Reactions    Actos [Pioglitazone] Swelling    Metformin Diarrhea       Problem List:    Patient Active Problem List    Diagnosis Date Noted    Anticipated difficulty with intubation 05/23/2017     Priority: High     Class: Chronic     Difficult two hands mask ventilation, intubated multiple times asleep with video laryngoscope. H/o tongue cancer surgery.       Chronic atrial fibrillation (H) 04/11/2023     Priority: Medium    Acute pancreatitis 11/01/2022     Priority: Medium    Chronic kidney disease, stage 2 (mild) 01/20/2022     Priority: Medium    Alcohol abuse 02/16/2021     Priority: Medium    Acute gastritis without hemorrhage, unspecified gastritis type 10/17/2020     Priority: Medium    Peripheral polyneuropathy 05/12/2020     Priority: Medium     Diabetic  Has seen neurology, on Lyrica      Chronic low back pain with sciatica, sciatica laterality unspecified, unspecified back pain laterality 05/12/2020     Priority: Medium    Alcohol dependence, uncomplicated (H)  02/04/2020     Priority: Medium    Spinal stenosis of lumbar region, unspecified whether neurogenic claudication present 02/04/2020     Priority: Medium    Pancreatic pseudocyst 04/18/2019     Priority: Medium    Long term current use of anticoagulant therapy 04/05/2018     Priority: Medium    Recurrent pancreatitis 03/30/2018     Priority: Medium     history of necrotizing pancreatitis, pancreatic duct leak, and a history of a stent in 2018, since removed      Atrial fibrillation (H) 01/23/2018     Priority: Medium     Paroxysmal atrial fibrillation, which was diagnosed as an incidental finding in the past.  Most recently he was evaluated by his PCP on 01/09, and actually during the visit he was in atrial fibrillation.  Later he was admitted in the hospital on 01/22 with acute pancreatitis.  Hospital stay was complicated by AFib with moments of RVR.   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  During staging and surveillance for the oral cancer, a pancreatic cystic lesion was identified. 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 Final surgical pathology showed an IPMN without evidence of invasive disease and with clear margins. Post op complications included a pancreatic leak and subdiaphragmatic abscess.   Had stent in 2018, with subsequent removal.      H/O colectomy 08/08/2013     Priority: Medium     He had severe C diff colitis which required a total abdominal colectomy 12/21/12 and subsequent ileorectal anastomosis in 2013.       Clostridium difficile enterocolitis 12/18/2012      Priority: Medium     He had severe C diff colitis with perforation which required a total abdominal colectomy 12/21/12 and subsequent ileorectal anastomosis in 2013.       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 Gerson Ravi and aristides at the  in 2012  Tumor stage [id number]  Nodes all negative and margins free  Had forearm free flap reconstruction by Dr Alvares.   Had soft tissue debulking 12/11/12     NAD 2017  CT scan of the neck every 2-3 years.  - Thyroid labs yearly.  - Carotid ultrasound in three to four years to evaluate for stenosis.      Hyperlipidemia LDL goal <100 10/31/2010     Priority: Medium    CAD (coronary artery disease) 05/26/2009     Priority: Medium     Stress testing 2009 showed inferior wall ischemia.  Cath preformed; identified moderate CAD with stenosis of 40-50% in LAD and RCA.  No stents were placed.  Echo in 01/2018 (done while in Afib with rates of 100-110); EF of 55-60%.  No RWMA.      GERD (gastroesophageal reflux disease) 05/26/2009     Priority: Medium    Hypertrophy of breast 09/25/2007     Priority: Medium    PERS HX TOBACCO USE - quit in 11/06 with chantix 03/15/2007     Priority: Medium    Hypertension, benign essential, goal below 140/90 11/07/2005     Priority: Medium     Patient has only fair bp control and with family history of diabetes will all ace if lab indicated also has bph and may op for psa and not digital exam next yr       Pain in joint, shoulder region 11/07/2005     Priority: Medium    Hypertrophy of prostate without urinary obstruction 11/07/2005     Priority: Medium     Problem list name updated by automated process. Provider to review       Impotence of organic origin 11/07/2005     Priority: Medium        Past Medical History:    Past Medical History:   Diagnosis Date    Acute gastritis 10/3/2020    Acute kidney injury 04/17/2019    Acute on chronic pancreatitis (H) 01/23/2018    Acute pancreatitis 10/21/2018    Acute recurrent pancreatitis 10/17/2020    Acute right-sided low back pain with left-sided sciatica 2/4/2020    Bacteriuria with pyuria 04/17/2019    C. difficile colitis     Chronic atrial fibrillation (H)     Colon polyp 08/26/2011    Dehydration 2/16/2021    Diabetes mellitus (H)     Groin fluid collection 12/15/2012    Hypertension     Malignant neoplasm (H) 08/2012    Pancreatic duct leak 5/3/2016    Recurrent pancreatitis        Past Surgical History:    Past Surgical History:   Procedure Laterality Date    BACK SURGERY      BREAST SURGERY  01/01/2008    right breast mass benign    COLECTOMY SUBTOTAL  2013    With diverting ostomy creation; done for toxic C difficile colitis    COLONOSCOPY      multiple polyps removed    COLONOSCOPY  08/24/2011    TUMOR/POLYP/LESION BY SNARE    COLONOSCOPY  12/17/2012    COLONOSCOPY    COLONOSCOPY  12/18/2012    COLONOSCOPY    DENERVATION OF SPERMATIC CORD MICROSURGICAL Left 05/23/2017    Procedure: DENERVATION OF SPERMATIC CORD MICROSURGICAL;;  Surgeon: Marcio Aggarwal MD;  [facility]    DISSECTION RADICAL NECK BILATERAL  08/02/2012    Procedure: DISSECTION RADICAL NECK BILATERAL;;  Surgeon: Yung Alvares MD;  [facility]    ENDOSCOPIC RETROGRADE CHOLANGIOPANCREATOGRAM N/A 05/10/2016    Procedure: COMBINED ENDOSCOPIC RETROGRADE CHOLANGIOPANCREATOGRAPHY, PLACE TUBE/STENT;  Surgeon: Yovanny Beasley MD;  [facility]    ENDOSCOPIC RETROGRADE CHOLANGIOPANCREATOGRAM N/A 03/29/2018    Procedure: ENDOSCOPIC RETROGRADE CHOLANGIOPANCREATOGRAM;  Endoscopic Retrograde Cholangiopancreatogram, Endoscopic Ultrasound, Biliary Sphincterotomy, Biliary and Pancreatic Stent Placement;   Surgeon: Yovanny Beasley MD;  [facility]    ENDOSCOPIC ULTRASOUND UPPER GASTROINTESTINAL TRACT (GI) N/A 02/03/2016    Procedure: ENDOSCOPIC ULTRASOUND, ESOPHAGOSCOPY / UPPER GASTROINTESTINAL TRACT (GI);  Surgeon: Grabiel Plata MD;  [facility]    ENDOSCOPIC ULTRASOUND UPPER GASTROINTESTINAL TRACT (GI) N/A 03/29/2018    Procedure: ENDOSCOPIC ULTRASOUND, ESOPHAGOSCOPY / UPPER GASTROINTESTINAL TRACT (GI);;  Surgeon: Grabiel Plata MD;  [facility]    ESOPHAGOSCOPY, GASTROSCOPY, DUODENOSCOPY (EGD), COMBINED N/A 02/03/2016    Procedure: COMBINED ENDOSCOPIC ULTRASOUND, ESOPHAGOSCOPY, GASTROSCOPY, DUODENOSCOPY (EGD), FINE NEEDLE ASPIRATE/BIOPSY;  Surgeon: Grabiel Plata MD;  [facility]    ESOPHAGOSCOPY, GASTROSCOPY, DUODENOSCOPY (EGD), COMBINED N/A 06/08/2016    Procedure: COMBINED ESOPHAGOSCOPY, GASTROSCOPY, DUODENOSCOPY (EGD), REMOVE FOREIGN BODY;  Surgeon: Yovanny Beasley MD;  [facility]    ESOPHAGOSCOPY, GASTROSCOPY, DUODENOSCOPY (EGD), COMBINED N/A 04/17/2018    Procedure: COMBINED ESOPHAGOSCOPY, GASTROSCOPY, DUODENOSCOPY (EGD), REMOVE FOREIGN BODY;  EGD with stent removal;  Surgeon: Grabiel Plata MD;  [facility]    ESOPHAGOSCOPY, GASTROSCOPY, DUODENOSCOPY (EGD), COMBINED N/A 10/26/2022    Procedure: ESOPHAGOGASTRODUODENOSCOPY, WITH BIOPSY;  Surgeon: Jonatan Celeste MD;  Location: [facility]    EXCISE LESION INTRAORAL  06/14/2012    Procedure: EXCISE LESION INTRAORAL;  Wide Local Excision Floor of Mouth, Direct Laryngoscopy, Bilateral Ida's Marsuplization, Split Thickness Skin Graft from right Thigh  Latex Safe;  Surgeon: Gerson Ravi MD;  Location:  OR    EXCISE LESION INTRAORAL  08/02/2012    Procedure: EXCISE LESION INTRAORAL;  Floor of Mouth Resection, Bilateral Selective Radical Neck Dissection, Tracheostomy, Left Radial Forearm  Free Flap with Alloderm, Nasogastric Feeding Tube Placement,    * Latex Safe*;  Surgeon: Gerson Ravi MD;   [facility]    EXCISE LESION INTRAORAL  2012    Procedure: EXCISE LESION INTRAORAL;  takedown of oral flap;  Surgeon: Yung Alvares MD;  [facility]    GRAFT FREE VASCULARIZED (LOCATION)  2012    Procedure: GRAFT FREE VASCULARIZED (LOCATION);;  Surgeon: Yung Alvares MD;  [facility]    GRAFT SKIN SPLIT THICKNESS FROM EXTREMITY  2012    Procedure: GRAFT SKIN SPLIT THICKNESS FROM EXTREMITY;;  Surgeon: Gerson Ravi MD;  [facility]    IR LOWER EXTREMITY ANGIOGRAM LEFT  2022    LAPAROSCOPIC ILEOSTOMY TAKEDOWN  2013    LAPAROSCOPIC ILEOSTOMY TAKEDOWN    LAPAROTOMY EXPLORATORY  2012    LAPAROTOMY EXPLORATORY with Colectomy    LARYNGOSCOPY  2012    Procedure: LARYNGOSCOPY;;  Surgeon: Gerson Ravi MD;  [facility]    ORTHOPEDIC SURGERY      ganglian cyst left ankle    PANCREATECTOMY, SPLENECTOMY N/A 03/10/2016    Procedure: PANCREATECTOMY, SPLENECTOMY;  Surgeon: Nael Abel MD;  [facility]    SHOULDER SURGERY  ,     2006- right rotator cuff, 2008 bone spur on left. Dr. Hdez    VARICOCELECTOMY Left 2017    Procedure: VARICOCELECTOMY;  Left Varicocele Repair, Denervation of Left Testis;  Surgeon: Marcio Aggarwal MD;  [facility]       Family History:    Family History   Problem Relation Age of Onset    Diabetes Sister         onset age 50    Alzheimer Disease Mother          80    Alzheimer Disease Father          85    Diabetes Other         nephew type 1    Diabetes Other     Aneurysm Sister     Anesthesia Reaction No family hx of     Colon Cancer No family hx of     Colon Polyps No family hx of     Crohn's Disease No family hx of     Ulcerative Colitis No family hx of        Social History:  Marital Status:   [2]  Social History     Tobacco Use    Smoking status: Former     Current packs/day: 0.00     Average packs/day: 1 pack/day for 40.0 years (40.0 ttl pk-yrs)     Types: Cigarettes     Start date:  "1966     Quit date: 2006     Years since quittin.4    Smokeless tobacco: Never   Vaping Use    Vaping status: Never Used   Substance Use Topics    Alcohol use: Not Currently    Drug use: No        Medications:    acetaminophen (TYLENOL) 500 MG tablet  atorvastatin (LIPITOR) 80 MG tablet  Continuous Glucose Sensor (FREESTYLE LISA 3 PLUS SENSOR) MISC  cyclobenzaprine (FLEXERIL) 5 MG tablet  diclofenac (VOLTAREN) 1 % topical gel  empagliflozin (JARDIANCE) 10 MG TABS tablet  empagliflozin (JARDIANCE) 25 MG TABS tablet  furosemide (LASIX) 20 MG tablet  insulin glargine (LANTUS PEN) 100 UNIT/ML pen  insulin pen needle (BD LUIS U/F) 32G X 4 MM miscellaneous  lisinopril (ZESTRIL) 20 MG tablet  metoprolol succinate ER (TOPROL XL) 50 MG 24 hr tablet  multivitamin, therapeutic with minerals (THERA-VIT-M) TABS  pregabalin (LYRICA) 150 MG capsule  tamsulosin (FLOMAX) 0.4 MG capsule  vitamin B-12 (CYANOCOBALAMIN) 100 MCG tablet  Vitamin D3 (CHOLECALCIFEROL) 25 mcg (1000 units) tablet  warfarin ANTICOAGULANT (COUMADIN) 2.5 MG tablet          Review of Systems  Pertinent positives and negatives mentioned in HPI    Physical Exam   BP: (!) 181/96  Pulse: 82  Temp: 97.7  F (36.5  C)  Resp: 18  Height: 175.3 cm (5' 9\")  Weight: 94.3 kg (208 lb)  SpO2: 92 %    GEN: Awake, alert, and cooperative. No acute distress.  HENT: sublingual surgical changes.   EYES: EOM intact. Conjunctiva clear. No discharge. No RAPD. No nystagmus.   NECK: Symmetric, freely mobile. Well healed surgical scar  CV : Regular rate and rhythm.  PULM: Normal effort. No wheezes, rales, or rhonchi bilaterally.  ABD: Soft, non-tender, non-distended. No rebound or guarding.   NEURO: 2 through 12 grossly intact.  Weakness in extension of right wrist and right thumb.  Limited effort against gravity.  Subjective sensory change at pad of first finger.  No other focal motor or sensory deficits.  No ataxia.  GCS 15.  Alert and oriented.  EXT: No tenderness, " "calor of right wrist.  Questionable soft tissue edema of right wrist.  Good radial pulse.        ED Course        Procedures         EKG: Interpreted by Armand Anaya MD ***       Critical Care time:  {none or minutes:998571}  {Trauma Activation or Fall?:483372}  {Sepsis/Septic Shock/Stemi/Stroke:658839::\"None\"}         Results for orders placed or performed during the hospital encounter of 04/24/25 (from the past 24 hours)   [place] Draw    Narrative    The following orders were created for panel order [place] Draw.  Procedure                               Abnormality         Status                     ---------                               -----------         ------                     Extra Blue Top Tube[[id number]]                             Final result               Extra Green Top (Lithiu...[[id number]]                      Final result               Extra Purple Top Tube[[id number]]                           Final result                 Please view results for these tests on the individual orders.   Extra Blue Top Tube   Result Value Ref Range    Hold Specimen JIC    Extra Green Top (Lithium Heparin) Tube   Result Value Ref Range    Hold Specimen JIC    Extra Purple Top Tube   Result Value Ref Range    Hold Specimen JIC    CBC with platelets differential    Narrative    The following orders were created for panel order CBC with platelets differential.  Procedure                               Abnormality         Status                     ---------                               -----------         ------                     CBC with platelets and ...[[id number]]  Abnormal            Final result                 Please view results for these tests on the individual orders.   Comprehensive metabolic panel   Result Value Ref Range    Sodium 138 135 - 145 mmol/L    Potassium 4.2 3.4 - 5.3 mmol/L    Carbon Dioxide (CO2) 27 22 - 29 mmol/L    Anion Gap 12 7 - 15 mmol/L    Urea Nitrogen 19.2 8.0 - 23.0 mg/dL    " Creatinine 1.06 0.67 - 1.17 mg/dL    GFR Estimate 72 >60 mL/min/1.73m2    Calcium 10.3 8.8 - 10.4 mg/dL    Chloride 99 98 - 107 mmol/L    Glucose 144 (H) 70 - 99 mg/dL    Alkaline Phosphatase 108 40 - 150 U/L    AST 23 0 - 45 U/L    ALT 18 0 - 70 U/L    Protein Total 7.5 6.4 - 8.3 g/dL    Albumin 3.8 3.5 - 5.2 g/dL    Bilirubin Total 0.6 <=1.2 mg/dL   INR   Result Value Ref Range    INR 2.51 (H) 0.85 - 1.15   Partial thromboplastin time   Result Value Ref Range    aPTT 46 (H) 22 - 38 Seconds   CBC with platelets and differential   Result Value Ref Range    WBC Count 8.8 4.0 - 11.0 10e3/uL    RBC Count 4.86 4.40 - 5.90 10e6/uL    Hemoglobin 16.0 13.3 - 17.7 g/dL    Hematocrit 49.7 40.0 - 53.0 %     (H) 78 - 100 fL    MCH 32.9 26.5 - 33.0 pg    MCHC 32.2 31.5 - 36.5 g/dL    RDW 17.1 (H) 10.0 - 15.0 %    Platelet Count 157 150 - 450 10e3/uL    % Neutrophils 63 %    % Lymphocytes 26 %    % Monocytes 10 %    % Eosinophils 1 %    % Basophils 0 %    % Immature Granulocytes 0 %    NRBCs per 100 WBC 2 (H) <1 /100    Absolute Neutrophils 5.5 1.6 - 8.3 10e3/uL    Absolute Lymphocytes 2.3 0.8 - 5.3 10e3/uL    Absolute Monocytes 0.9 0.0 - 1.3 10e3/uL    Absolute Eosinophils 0.1 0.0 - 0.7 10e3/uL    Absolute Basophils 0.0 0.0 - 0.2 10e3/uL    Absolute Immature Granulocytes 0.0 <=0.4 10e3/uL    Absolute NRBCs 0.1 10e3/uL   EKG 12-lead, tracing only   Result Value Ref Range    Systolic Blood Pressure  mmHg    Diastolic Blood Pressure  mmHg    Ventricular Rate 93 BPM    Atrial Rate 133 BPM    MA Interval  ms    QRS Duration 160 ms     ms    QTc 542 ms    P Axis  degrees    R AXIS -64 degrees    T Axis 95 degrees    Interpretation ECG       Undetermined rhythm  Right bundle branch block  Left anterior fascicular block  ** Bifascicular block **  Left ventricular hypertrophy with repolarization abnormality ( R in aVL , [place] )  Abnormal ECG  When compared with ECG of 20-Apr-2018 00:50,  Current undetermined rhythm  precludes rhythm comparison, needs review  (RBBB and left anterior fascicular block) has replaced Non-specific intra-ventricular conduction delay     Glucose by meter   Result Value Ref Range    GLUCOSE BY METER POCT 134 (H) 70 - 99 mg/dL   MRA Brain ([place]) wo Contrast    Narrative    EXAM: MR BRAIN W/O and W CONTRAST, MRA NECK (CAROTIDS) W/O and W CONTRAST, MRA BRAIN ([place]) W/O CONTRAST  LOCATION: [facility]  DATE: 4/24/2025    INDICATION: weakness in radial nerve distribution, onset last night, subjective sensory change, no pain, please eval for CVA  COMPARISON: None.  CONTRAST: 10ml gadavist  TECHNIQUE:   1) Routine multiplanar multisequence head MRI without and with intravenous contrast.  2) 3D time-of-flight head MRA without intravenous contrast.  3) Neck MRA without and with IV contrast. Stenosis measurements made according to NASCET criteria unless otherwise specified.    FINDINGS:  HEAD MRI:  INTRACRANIAL CONTENTS: No acute or subacute infarct. No mass, acute hemorrhage, or extra-axial fluid collections. Scattered nonspecific T2/FLAIR hyperintensities within the cerebral white matter most consistent with mild chronic microvascular ischemic   change. Mild to moderate generalized cerebral atrophy. No hydrocephalus. Normal position of the cerebellar tonsils. No pathologic contrast enhancement.    SELLA: No abnormality accounting for technique.    OSSEOUS STRUCTURES/SOFT TISSUES: Normal marrow signal. The major intracranial vascular flow voids are maintained.     ORBITS: No abnormality accounting for technique.     SINUSES/MASTOIDS: Mild mucosal thickening scattered about the paranasal sinuses. No middle ear or mastoid effusion.     HEAD MRA:   ANTERIOR CIRCULATION: No stenosis/occlusion, aneurysm, or high flow vascular malformation. Standard [place] anatomy.    POSTERIOR CIRCULATION: 2.5 x 3 mm superiorly and laterally projecting aneurysm arising from  the distal basilar artery on the right. No stenosis/occlusion, or high flow vascular malformation. Dominant left and smaller right vertebral artery contribute to   a normal basilar artery.     NECK MRA:   RIGHT CAROTID: No measurable stenosis or dissection.    LEFT CAROTID: No measurable stenosis or dissection.    VERTEBRAL ARTERIES: No focal stenosis or dissection. Dominant left and smaller right vertebral arteries.    AORTIC ARCH: Classic aortic arch anatomy with no significant stenosis at the origin of the great vessels.      Impression    IMPRESSION:  HEAD MRI:  1.  No acute or subacute ischemic change.  2.  No acute intracranial process or abnormal enhancement.  3.  Age-related changes as above.    HEAD MRA:  1.  No aneurysm, high flow AVM or significant stenosis identified.  2.  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    NECK MRA:  1.  No hemodynamically significant stenosis within the vessels of the neck.     MRA Neck (Carotids) wo & w Contrast    Narrative    EXAM: MR BRAIN W/O and W CONTRAST, MRA NECK (CAROTIDS) W/O and W CONTRAST, MRA BRAIN ([place]) W/O CONTRAST  LOCATION: [facility]  DATE: 4/24/2025    INDICATION: weakness in radial nerve distribution, onset last night, subjective sensory change, no pain, please eval for CVA  COMPARISON: None.  CONTRAST: 10ml gadavist  TECHNIQUE:   1) Routine multiplanar multisequence head MRI without and with intravenous contrast.  2) 3D time-of-flight head MRA without intravenous contrast.  3) Neck MRA without and with IV contrast. Stenosis measurements made according to NASCET criteria unless otherwise specified.    FINDINGS:  HEAD MRI:  INTRACRANIAL CONTENTS: No acute or subacute infarct. No mass, acute hemorrhage, or extra-axial fluid collections. Scattered nonspecific T2/FLAIR hyperintensities within the cerebral white matter most consistent with mild chronic  microvascular ischemic   change. Mild to moderate generalized cerebral atrophy. No hydrocephalus. Normal position of the cerebellar tonsils. No pathologic contrast enhancement.    SELLA: No abnormality accounting for technique.    OSSEOUS STRUCTURES/SOFT TISSUES: Normal marrow signal. The major intracranial vascular flow voids are maintained.     ORBITS: No abnormality accounting for technique.     SINUSES/MASTOIDS: Mild mucosal thickening scattered about the paranasal sinuses. No middle ear or mastoid effusion.     HEAD MRA:   ANTERIOR CIRCULATION: No stenosis/occlusion, aneurysm, or high flow vascular malformation. Standard [place] anatomy.    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 No stenosis/occlusion, or high flow vascular malformation. Dominant left and smaller right vertebral artery contribute to   a normal basilar artery.     NECK MRA:   RIGHT CAROTID: No measurable stenosis or dissection.    LEFT CAROTID: No measurable stenosis or dissection.    VERTEBRAL ARTERIES: No focal stenosis or dissection. Dominant left and smaller right vertebral arteries.    AORTIC ARCH: Classic aortic arch anatomy with no significant stenosis at the origin of the great vessels.      Impression    IMPRESSION:  HEAD MRI:  1.  No acute or subacute ischemic change.  2.  No acute intracranial process or abnormal enhancement.  3.  Age-related changes as above.    HEAD MRA:  1.  No aneurysm, high flow AVM or significant stenosis identified.  2.  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    NECK MRA:  1.  No hemodynamically significant stenosis within the vessels of the neck.     MR Brain w/o & w Contrast    Narrative    EXAM: MR BRAIN W/O and W CONTRAST, MRA NECK (CAROTIDS) W/O and W CONTRAST, MRA BRAIN ([place]) W/O CONTRAST  LOCATION: [facility]  DATE: 4/24/2025    INDICATION: weakness in radial nerve distribution, onset last night, subjective sensory change, no pain, please eval for CVA  COMPARISON: None.  CONTRAST: 10ml gadavist  TECHNIQUE:   1) Routine multiplanar multisequence head MRI without and with intravenous contrast.  2) 3D time-of-flight head MRA without intravenous contrast.  3) Neck MRA without and with IV contrast. Stenosis measurements made according to NASCET criteria unless otherwise specified.    FINDINGS:  HEAD MRI:  INTRACRANIAL CONTENTS: No acute or subacute infarct. No mass, acute hemorrhage, or extra-axial fluid collections. Scattered nonspecific T2/FLAIR hyperintensities within the cerebral white matter most consistent with mild chronic microvascular ischemic   change. Mild to moderate generalized cerebral atrophy. No hydrocephalus. Normal position of the cerebellar tonsils. No pathologic contrast enhancement.    SELLA: No abnormality accounting for technique.    OSSEOUS STRUCTURES/SOFT TISSUES: Normal marrow signal. The major intracranial vascular flow voids are maintained.     ORBITS: No abnormality accounting for technique.     SINUSES/MASTOIDS: Mild mucosal thickening scattered about the paranasal sinuses. No middle ear or mastoid effusion.     HEAD MRA:   ANTERIOR CIRCULATION: No stenosis/occlusion, aneurysm, or high flow vascular malformation. Standard [place] anatomy.    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 No stenosis/occlusion, or high flow vascular malformation. Dominant left and smaller right vertebral artery contribute to   a normal basilar artery.     NECK MRA:   RIGHT CAROTID: No measurable stenosis or dissection.    LEFT CAROTID: No measurable stenosis or dissection.    VERTEBRAL ARTERIES: No focal stenosis or dissection. Dominant left and smaller right vertebral arteries.    AORTIC ARCH: Classic aortic arch  "anatomy with no significant stenosis at the origin of the great vessels.      Impression    IMPRESSION:  HEAD MRI:  1.  No acute or subacute ischemic change.  2.  No acute intracranial process or abnormal enhancement.  3.  Age-related changes as above.    HEAD MRA:  1.  No aneurysm, high flow AVM or significant stenosis identified.  2.  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    NECK MRA:  1.  No hemodynamically significant stenosis within the vessels of the neck.       *Note: Due to a large number of results and/or encounters for the requested time period, some results have not been displayed. A complete set of results can be found in Results Review.       Medications   gadobutrol (GADAVIST) injection 10 mL (10 mLs Intravenous $Given 4/24/25 2045)   sodium chloride (PF) 0.9% PF flush 50 mL (50 mLs Intracatheter $Given 4/24/25 2045)       Assessments & Plan (with Medical Decision Making)   79 year old male with past medical history ***    No CVA on MRI.  3 mm aneurysm right basal artery.    On reevaluation slight improvement in wrist extension.  I suspect this is a radial nerve palsy from when he fell asleep in his chair last evening.  Wrist splint given.  Rest.  EMG and neurology follow-up if symptoms continue for 2 weeks.  Neurology referral placed.  Case discussed with vascular neurology and neurosurgery referral made for aneurysm.    {Admit:549332}    I have reviewed the nursing notes.    {ED Addendum:525656::\" \"}    Discharge Medication List as of 4/24/2025 10:31 PM          Final diagnoses:   Radial nerve palsy, right   Basilar artery aneurysm     Armand Anaya MD        4/24/2025   [facility] EMERGENCY DEPT    Disclaimer: This note consists of words and symbols derived from keyboarding and dictation using voice recognition software.  As a result, there may be errors that have gone undetected.  Please consider this when " interpreting information found in this note.

## 2025-04-25 LAB
ATRIAL RATE - MUSE: 133 BPM
DIASTOLIC BLOOD PRESSURE - MUSE: NORMAL MMHG
INTERPRETATION ECG - MUSE: NORMAL
P AXIS - MUSE: NORMAL DEGREES
PR INTERVAL - MUSE: NORMAL MS
QRS DURATION - MUSE: 160 MS
QT - MUSE: 436 MS
QTC - MUSE: 542 MS
R AXIS - MUSE: -64 DEGREES
SYSTOLIC BLOOD PRESSURE - MUSE: NORMAL MMHG
T AXIS - MUSE: 95 DEGREES
VENTRICULAR RATE- MUSE: 93 BPM

## 2025-04-25 NOTE — PROGRESS NOTES
Brief Vascular Neurology note:    I was called back to review imaging. MRI Brain showed no acute infarcts. MRA Head/Neck showed 3 mm distal basilar aneurysm. Talking with the ED provider, patient had fallen asleep on a chair and he noticed right wrist drop when he woke up. He seems to have isolated wrist drop with some sensory disturbances in his fingers - this is consistent with radial nerve palsy.     I anticipate that he will improve over the next few weeks. I recommend wrist splinting, physical therapy and follow up with general neurology outpatient. Follow up with neuro IR for basilar aneurysm (next available).     Cali Cobian MD     Department of Neurology

## 2025-04-30 ENCOUNTER — PATIENT OUTREACH (OUTPATIENT)
Dept: CARE COORDINATION | Facility: CLINIC | Age: 80
End: 2025-04-30
Payer: COMMERCIAL

## 2025-05-05 ENCOUNTER — PATIENT OUTREACH (OUTPATIENT)
Dept: CARE COORDINATION | Facility: CLINIC | Age: 80
End: 2025-05-05
Payer: COMMERCIAL

## 2025-05-12 ENCOUNTER — TELEPHONE (OUTPATIENT)
Dept: EDUCATION SERVICES | Facility: CLINIC | Age: 80
End: 2025-05-12

## 2025-05-12 ENCOUNTER — ALLIED HEALTH/NURSE VISIT (OUTPATIENT)
Dept: EDUCATION SERVICES | Facility: CLINIC | Age: 80
End: 2025-05-12
Payer: COMMERCIAL

## 2025-05-12 DIAGNOSIS — Z79.4 TYPE 2 DIABETES MELLITUS WITH DIABETIC NEUROPATHY, WITH LONG-TERM CURRENT USE OF INSULIN (H): Primary | ICD-10-CM

## 2025-05-12 DIAGNOSIS — I10 HYPERTENSION, BENIGN ESSENTIAL, GOAL BELOW 140/90: Chronic | ICD-10-CM

## 2025-05-12 DIAGNOSIS — E11.40 TYPE 2 DIABETES MELLITUS WITH DIABETIC NEUROPATHY, WITH LONG-TERM CURRENT USE OF INSULIN (H): Primary | ICD-10-CM

## 2025-05-12 DIAGNOSIS — N18.2 CHRONIC KIDNEY DISEASE, STAGE 2 (MILD): ICD-10-CM

## 2025-05-12 DIAGNOSIS — E78.5 HYPERLIPIDEMIA LDL GOAL <100: ICD-10-CM

## 2025-05-12 PROCEDURE — G0108 DIAB MANAGE TRN  PER INDIV: HCPCS | Performed by: DIETITIAN, REGISTERED

## 2025-05-12 NOTE — PATIENT INSTRUCTIONS
Things look good!     2.   Continue to watch your portions and stay active, you are doing well.

## 2025-05-12 NOTE — TELEPHONE ENCOUNTER
Dr Clark,  Can you please place a new diabetes education referral.   Thanks! Ruthann Alaniz RD, [facility]

## 2025-05-12 NOTE — PROGRESS NOTES
Diabetes Self-Management Education & Support    Presents for: Individual review    Type of Service: In Person Visit      Assessment  =doing well, 91% time in range, 139 average  -no changes  -labs all up to date, eye exam in September, discussed complications  -is seeing PCP next week so did not get the A1c today as she may need more labs done  -staying active as able  Patient's most recent   Lab Results   Component Value Date    A1C 8.4 11/18/2024    A1C 7.1 10/03/2020     is not meeting goal of <7.0    Diabetes knowledge and skills assessment:   Patient is knowledgeable in diabetes management concepts related to: Healthy Eating, Being Active, and Monitoring    Based on learning assessment above, most appropriate setting for further diabetes education would be: Individual setting.    Care Plan and Education Provided:  Healthy Eating: Portion control    Patient verbalized understanding of diabetes self-management education concepts discussed, opportunities for ongoing education and support, and recommendations provided today.    Plan   Things look good!     2.   Continue to watch your portions and stay active, you are doing well.        See Care Plan for co-developed, patient-state behavior change goals.    Education Materials Provided:  No new materials provided today      Subjective/Objective  Poli or Saleem is an 79 year old, presenting for the following diabetes education related to: Individual review  Accompanied by: Self  Diabetes education in the past 24mo: Yes  Diabetes type: Type 2  Disease course: Stable  How confident are you filling out medical forms by yourself:: Extremely  Diabetes management related comments/concerns: no  Cultural Influences/Ethnic Background:  Not  or       Diabetes Symptoms & Complications:  Diabetes Related Symptoms: None  Weight trend: Stable  Symptom course: Stable  Disease course: Stable       Patient Problem List and Family Medical History reviewed for relevant  "medical history, current medical status, and diabetes risk factors.    Vitals:  There were no vitals taken for this visit.  Estimated body mass index is 31.45 kg/m  as calculated from the following:    Height as of 5/9/25: 1.753 m (5' 9\").    Weight as of 5/9/25: 96.6 kg (213 lb).   Last 3 BP:   BP Readings from Last 3 Encounters:   05/09/25 (!) 153/86   04/24/25 (!) 173/89   02/18/25 128/76       History   Smoking Status    Former    Types: Cigarettes   Smokeless Tobacco    Never       Labs:  Lab Results   Component Value Date    A1C 8.4 11/18/2024    A1C 7.1 10/03/2020     Lab Results   Component Value Date     04/24/2025     04/24/2025     07/19/2022     02/22/2021     Lab Results   Component Value Date    LDL 59 08/05/2024    LDL 54 02/22/2021     HDL Cholesterol   Date Value Ref Range Status   02/22/2021 29 (L) >39 mg/dL Final     Direct Measure HDL   Date Value Ref Range Status   08/05/2024 35 (L) >=40 mg/dL Final     GFR Estimate   Date Value Ref Range Status   04/24/2025 72 >60 mL/min/1.73m2 Final     Comment:     eGFR calculated using 2021 CKD-EPI equation.   02/22/2021 65 >60 mL/min/[1.73_m2] Final     Comment:     Non  GFR Calc  Starting 12/18/2018, serum creatinine based estimated GFR (eGFR) will be   calculated using the Chronic Kidney Disease Epidemiology Collaboration   (CKD-EPI) equation.       GFR, ESTIMATED POCT   Date Value Ref Range Status   09/13/2022 >60 >60 mL/min/1.73m2 Final     GFR Estimate If Black   Date Value Ref Range Status   02/22/2021 76 >60 mL/min/[1.73_m2] Final     Comment:      GFR Calc  Starting 12/18/2018, serum creatinine based estimated GFR (eGFR) will be   calculated using the Chronic Kidney Disease Epidemiology Collaboration   (CKD-EPI) equation.       Lab Results   Component Value Date    CR 1.06 04/24/2025    CR 1.11 02/22/2021     Lab Results   Component Value Date    MICROL 358.1 11/18/2024    UMALCR 776.79 (H) " 11/18/2024    [facility] 46.1 11/18/2024 5/12/2025   Healthy Eating   Healthy Eating Assessed Today Yes   Cultural/Hinduism diet restrictions? No   How many times a week on average do you eat food made away from home (restaurant/take-out)? 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  Answers submitted by the patient for this visit:  Questionnaire about: Diabetes problem (Submitted on 5/7/2025)  Chief Complaint: Diabetes problem

## 2025-05-13 ENCOUNTER — PATIENT OUTREACH (OUTPATIENT)
Dept: CARE COORDINATION | Facility: CLINIC | Age: 80
End: 2025-05-13
Payer: COMMERCIAL

## 2025-05-14 ENCOUNTER — ANTICOAGULATION THERAPY VISIT (OUTPATIENT)
Dept: ANTICOAGULATION | Facility: CLINIC | Age: 80
End: 2025-05-14

## 2025-05-14 ENCOUNTER — LAB (OUTPATIENT)
Dept: LAB | Facility: CLINIC | Age: 80
End: 2025-05-14
Payer: COMMERCIAL

## 2025-05-14 ENCOUNTER — RESULTS FOLLOW-UP (OUTPATIENT)
Dept: ANTICOAGULATION | Facility: CLINIC | Age: 80
End: 2025-05-14

## 2025-05-14 DIAGNOSIS — I48.20 CHRONIC ATRIAL FIBRILLATION (H): Primary | Chronic | ICD-10-CM

## 2025-05-14 DIAGNOSIS — I48.20 CHRONIC ATRIAL FIBRILLATION (H): ICD-10-CM

## 2025-05-14 DIAGNOSIS — Z79.01 LONG TERM CURRENT USE OF ANTICOAGULANT THERAPY: ICD-10-CM

## 2025-05-14 DIAGNOSIS — G56.31 RADIAL NERVE PALSY, RIGHT: ICD-10-CM

## 2025-05-14 DIAGNOSIS — Z79.01 LONG TERM CURRENT USE OF ANTICOAGULANT THERAPY: Chronic | ICD-10-CM

## 2025-05-14 DIAGNOSIS — G62.9 NEUROPATHY: ICD-10-CM

## 2025-05-14 DIAGNOSIS — Z79.4 TYPE 2 DIABETES MELLITUS WITH DIABETIC NEUROPATHY, WITH LONG-TERM CURRENT USE OF INSULIN (H): Primary | ICD-10-CM

## 2025-05-14 DIAGNOSIS — E11.40 TYPE 2 DIABETES MELLITUS WITH DIABETIC NEUROPATHY, WITH LONG-TERM CURRENT USE OF INSULIN (H): Primary | ICD-10-CM

## 2025-05-14 LAB
EST. AVERAGE GLUCOSE BLD GHB EST-MCNC: 166 MG/DL
HBA1C MFR BLD: 7.4 % (ref 0–5.6)
INR BLD: 3.3 (ref 0.9–1.1)
VIT B12 SERPL-MCNC: 1021 PG/ML (ref 232–1245)

## 2025-05-14 PROCEDURE — 82607 VITAMIN B-12: CPT

## 2025-05-14 PROCEDURE — 36415 COLL VENOUS BLD VENIPUNCTURE: CPT

## 2025-05-14 PROCEDURE — 85610 PROTHROMBIN TIME: CPT

## 2025-05-14 PROCEDURE — 83036 HEMOGLOBIN GLYCOSYLATED A1C: CPT

## 2025-05-14 NOTE — TELEPHONE ENCOUNTER
RECORDS RECEIVED FROM: Internal    REASON FOR VISIT: Basilar artery aneurysm   PROVIDER: Fouzia Felipe APRN CNP   DATE OF APPT: 5/22/25 2 8:00 am    NOTES (FOR ALL VISITS) STATUS DETAILS   OFFICE NOTE from referring provider Internal ED Referral    DISCHARGE REPORT from the ER Internal 4/24/25 Armand Anaya MD @[facility]     MEDICATION LIST Internal    IMAGING  (FOR ALL VISITS)     MRI (HEAD, NECK, SPINE) Internal [facility]  4/24/25 MR Brain  4/24/25 MRA Brain (COS)  4/24/25 MRA Neck (Carotid)

## 2025-05-14 NOTE — PROGRESS NOTES
ANTICOAGULATION MANAGEMENT     Antoine Russo 79 year old male is on warfarin with supratherapeutic INR result. (Goal INR 2.0-3.0)    Recent labs: (last 7 days)     05/14/25  0823   INR 3.3*       ASSESSMENT     Warfarin Lab Questionnaire    Warfarin Doses Last 7 Days      5/13/2025     9:14 AM   Dose in Tablet or Mg   TAB or MG? milligram (mg)     Pt Rptd Dose SUNDAY MONDAY TUESDAY WED THURS FRIDAY SATURDAY 5/13/2025   9:14 AM 1.25 2.5 1.25 2.5 1.25 2.5 2.5         5/13/2025   Warfarin Lab Questionnaire   Missed doses within past 14 days? No   Changes in diet or alcohol within past 14 days? No, patient reports he eats 1-2 iceberg lettuce salads per week, can only recall 1 serving which was last night. Patient reports he does not like other types of lettuces or vegetables, agrees to add green beans as he is not interested in decreasing his warfarin maintenance dose, yet.    Medication changes since last result? No   Injuries or illness since last result? No   New shortness of breath, severe headaches or sudden changes in vision since last result? No   Abnormal bleeding since last result? No   Upcoming surgery, procedure? No   Best number to call with results? bookem [phone]     Previous result: Therapeutic last visit; previously outside of goal range  Additional findings: several recent supratherapeutic INRs, patient prefers to increase greens rather than decreasing his warfarin maintenance dose. Patient also prefers to have his INR checked while in clinic on 5/19/25, he reports he will be busy and/or traveling after that.       PLAN     Recommended plan for temporary change(s) affecting INR     Dosing Instructions:Increase vitamin K intake and strive for consistency, Continue your current warfarin dose with next INR in 5 days       Summary  As of 5/14/2025      Full warfarin instructions:  1.25 mg every Sun, Tue, Thu; 2.5 mg all other days   Next INR check:  5/19/2025               Telephone call with  Poli or Saleem who verbalizes understanding and agrees to plan    Lab visit scheduled    Education provided: Dietary considerations: importance of consistent vitamin K intake, impact of vitamin K foods on INR, and vitamin K content of foods    Plan made per [facility] anticoagulation protocol    Linda Conner RN  5/14/2025  Anticoagulation Clinic  [facility] for routing messages: [facility] patient phone line: [phone]        _______________________________________________________________________     Anticoagulation Episode Summary       Current INR goal:  2.0-3.0   TTR:  78.8% (1 y)   Target end date:  Indefinite   Send INR reminders to:  [facility]    Indications    Atrial fibrillation (H) [I48.91]  Long term current use of anticoagulant therapy [Z79.01]  Chronic atrial fibrillation (H) [I48.20]             Comments:  --             Anticoagulation Care Providers       Provider Role Specialty Phone number    Katie Martino MD Referring Family Medicine [phone]    Brianna Matute APRN CNP Referring Family Medicine [phone]    Myrna Clark MD Referring Family Medicine [phone]

## 2025-05-15 ENCOUNTER — PATIENT OUTREACH (OUTPATIENT)
Dept: CARE COORDINATION | Facility: CLINIC | Age: 80
End: 2025-05-15
Payer: COMMERCIAL

## 2025-05-15 ENCOUNTER — RESULTS FOLLOW-UP (OUTPATIENT)
Dept: FAMILY MEDICINE | Facility: CLINIC | Age: 80
End: 2025-05-15

## 2025-05-19 ENCOUNTER — ANTICOAGULATION THERAPY VISIT (OUTPATIENT)
Dept: ANTICOAGULATION | Facility: CLINIC | Age: 80
End: 2025-05-19

## 2025-05-19 ENCOUNTER — RESULTS FOLLOW-UP (OUTPATIENT)
Dept: ANTICOAGULATION | Facility: CLINIC | Age: 80
End: 2025-05-19

## 2025-05-19 ENCOUNTER — OFFICE VISIT (OUTPATIENT)
Dept: FAMILY MEDICINE | Facility: CLINIC | Age: 80
End: 2025-05-19
Payer: COMMERCIAL

## 2025-05-19 VITALS
HEIGHT: 69 IN | WEIGHT: 217 LBS | HEART RATE: 80 BPM | OXYGEN SATURATION: 93 % | RESPIRATION RATE: 16 BRPM | TEMPERATURE: 97.5 F | DIASTOLIC BLOOD PRESSURE: 82 MMHG | BODY MASS INDEX: 32.14 KG/M2 | SYSTOLIC BLOOD PRESSURE: 160 MMHG

## 2025-05-19 DIAGNOSIS — I48.20 CHRONIC ATRIAL FIBRILLATION (H): Primary | Chronic | ICD-10-CM

## 2025-05-19 DIAGNOSIS — Z79.01 LONG TERM CURRENT USE OF ANTICOAGULANT THERAPY: ICD-10-CM

## 2025-05-19 DIAGNOSIS — Z79.01 LONG TERM CURRENT USE OF ANTICOAGULANT THERAPY: Chronic | ICD-10-CM

## 2025-05-19 DIAGNOSIS — I48.20 CHRONIC ATRIAL FIBRILLATION (H): ICD-10-CM

## 2025-05-19 DIAGNOSIS — Z79.4 TYPE 2 DIABETES MELLITUS WITH DIABETIC NEUROPATHY, WITH LONG-TERM CURRENT USE OF INSULIN (H): Primary | ICD-10-CM

## 2025-05-19 DIAGNOSIS — E11.40 TYPE 2 DIABETES MELLITUS WITH DIABETIC NEUROPATHY, WITH LONG-TERM CURRENT USE OF INSULIN (H): Primary | ICD-10-CM

## 2025-05-19 DIAGNOSIS — Z23 NEED FOR VACCINATION: ICD-10-CM

## 2025-05-19 DIAGNOSIS — R60.0 LOWER EXTREMITY EDEMA: ICD-10-CM

## 2025-05-19 LAB — INR BLD: 3.7 (ref 0.9–1.1)

## 2025-05-19 PROCEDURE — 36416 COLLJ CAPILLARY BLOOD SPEC: CPT | Performed by: STUDENT IN AN ORGANIZED HEALTH CARE EDUCATION/TRAINING PROGRAM

## 2025-05-19 PROCEDURE — 85610 PROTHROMBIN TIME: CPT | Performed by: STUDENT IN AN ORGANIZED HEALTH CARE EDUCATION/TRAINING PROGRAM

## 2025-05-19 RX ORDER — FUROSEMIDE 20 MG/1
20 TABLET ORAL DAILY PRN
Qty: 15 TABLET | Refills: 0 | Status: SHIPPED | OUTPATIENT
Start: 2025-05-19

## 2025-05-19 ASSESSMENT — PAIN SCALES - GENERAL: PAINLEVEL_OUTOF10: NO PAIN (0)

## 2025-05-19 NOTE — PROGRESS NOTES
"  Assessment & Plan     Type 2 diabetes mellitus with diabetic neuropathy, with long-term current use of insulin (H)  Patient is a pleasant 79 year old who presents today for 6 month diabetes follow up. Sees DM ed in the interim between out visits with significant improvement in A1c down to 7.4 collected last week. Continue current regimen, continue care through DM ed. Has significant loss of sensation in the feet bilaterally, chronic and slightly worsened from last year. Continue CGM given chronic insulin usage.   - Foot exam    Lower extremity edema  Had similar issue last year. Started within the past few weeks. Will treat again with lasix. Has upcoming ERIN testing repeat. Has known vascular disease and follows with vascular specialist blood pressure elevated today , could potentially consider addition of daily diuretic in the future.   - furosemide (LASIX) 20 MG tablet  Dispense: 15 tablet; Refill: 0    Need for vaccination  Recommended he get this at the pharmacy.   - RSV vaccine, bivalent, ABRYSVO, injection  Dispense: 0.5 mL; Refill: 0        BMI  Estimated body mass index is 32.05 kg/m  as calculated from the following:    Height as of this encounter: 1.753 m (5' 9\").    Weight as of this encounter: 98.4 kg (217 lb).     The longitudinal plan of care for the diagnosis(es)/condition(s) as documented were addressed during this visit. Due to the added complexity in care, I will continue to support Poli or Corrinaadrian in the subsequent management and with ongoing continuity of care.    Subjective   Poli or Corrinaem is a 79 year old, presenting for the following health issues:  Back Pain, Diabetes, and Swelling (Feet and ankles)        5/19/2025     6:56 AM   Additional Questions   Roomed by Doris DO   Accompanied by Self     History of Present Illness       Back Pain:  He presents for follow up of back pain. Patient's back pain is a new problem.    Original cause of back pain: not sure  First noticed back pain: 1-4 " "weeks ago  Patient feels back pain: 2 to 4 times per dayLocation of back pain:  Right middle of back and right buttock  Description of back pain: shooting  Back pain spreads: right buttocks    Since patient first noticed back pain, pain is: always present, but gets better and worse  Does back pain interfere with his job:  Not applicable  On a scale of 1-10 (10 being the worst), patient describes pain as:  6  What makes back pain worse: certain positions   Acupuncture: not tried  Acetaminophen: not tried  Activity or exercise: not helpful  Chiropractor:  Not tried  Heat: not tried  Massage: not tried  Muscle relaxants: not tried  NSAIDS: not tried  Opioids: not tried  Physical Therapy: not tried  Rest: not helpful  Steroid Injection: not tried  Stretching: not tried  Surgery: not tried  TENS unit: not tried  Topical pain relievers: not tried  Other healthcare providers patient is seeing for back pain: None    Reason for visit:  Follow-up  Symptom intensity:  Mild  Symptom progression:  Improving  Had these symptoms before:  No   He is taking medications regularly.      Diabetes:   Just saw DM ED last week.     Chronic numbness, some tingling.     Feet and ankle swelling:   Started up a few weeks ago.   Not quite so bad this morning       Back pain :   But this is better now.     Was using berberine or something, so thinks that's what was causing the higher INR.     Review of Systems  Constitutional, HEENT, cardiovascular, pulmonary, gi and gu systems are negative, except as otherwise noted.      Objective    BP (!) 160/82 (BP Location: Right arm, Patient Position: Sitting, Cuff Size: Adult Regular)   Pulse 80   Temp 97.5  F (36.4  C) (Tympanic)   Resp 16   Ht 1.753 m (5' 9\")   Wt 98.4 kg (217 lb)   SpO2 93%   BMI 32.05 kg/m    Body mass index is 32.05 kg/m .  Physical Exam  Constitutional:       Appearance: Normal appearance.   HENT:      Head: Normocephalic.   Eyes:      General: No scleral icterus.     " Extraocular Movements: Extraocular movements intact.      Conjunctiva/sclera: Conjunctivae normal.   Cardiovascular:      Rate and Rhythm: Normal rate.   Pulmonary:      Effort: Pulmonary effort is normal.   Musculoskeletal:      Right lower leg: Edema (2-3+ to knee) present.      Left lower leg: Edema (2-3+ to knee) present.   Neurological:      General: No focal deficit present.      Mental Status: He is alert and oriented to person, place, and time.          Diabetic Foot Screen:  Any complaints of increased pain or numbness ? No, but chronic numbness and tingling.   Is there a foot ulcer now or a history of foot ulcer? No  Does the foot have an abnormal shape?  YES flat  Are the nails thick, too long or ingrown?  YES thick  Are there any redness or open areas? No         Sensation Testing done at all points on the diagram with monofilament     Right Foot: Sensation Absent at the following points , 1, 2, 3, 4, 5, 6, 7, 8, 9, and 10  Left Foot: Sensation Absent at the following points , 1, 2, 3, 4, 5, 6, 7, 8, 9, and 10     Risk Category: 2- Loss of protective sensation with weakness, deformity, pre-ulcer or callous but no ulceration  Performed by Myrna Clark MD     Results from this visitNo results found for any visits on 05/19/25.          Signed Electronically by: Myrna Clark MD

## 2025-05-19 NOTE — PROGRESS NOTES
ANTICOAGULATION MANAGEMENT     Antoine Russo 79 year old male is on warfarin with supratherapeutic INR result. (Goal INR 2.0-3.0)    Recent labs: (last 7 days)     05/19/25  0738   INR 3.7*       ASSESSMENT     Source(s): Chart Review and Patient/Caregiver Call     Warfarin doses taken: Warfarin taken as instructed  Diet: No new diet changes identified  Medication/supplement changes: None noted  New illness, injury, or hospitalization: No  Signs or symptoms of bleeding or clotting: No - bruising but this is not unusual for him   Previous result: Supratherapeutic  Additional findings: None    Pt already took dose this AM   PLAN     Recommended plan for no diet, medication or health factor changes affecting INR     Dosing Instructions: decrease your warfarin dose (9.1% change) with next INR in 2 weeks    (due to pt's schedule and lab schedule, the soonest pt could come in is 6/6)    Summary  As of 5/19/2025      Full warfarin instructions:  2.5 mg every Mon, Wed, Fri; 1.25 mg all other days   Next INR check:  6/6/2025               Telephone call with Poli or Saleem who verbalizes understanding and agrees to plan    Lab visit scheduled    Education provided: Please call back if any changes to your diet, medications or how you've been taking warfarin  Goal range and lab monitoring: goal range and significance of current result, Importance of therapeutic range, and Importance of following up at instructed interval  Symptom monitoring: monitoring for bleeding signs and symptoms    Plan made per [facility] anticoagulation protocol    Aurora Carrillo, RN  5/19/2025  Anticoagulation Clinic  [employer] for routing messages: [facility] patient phone line: [phone]        _______________________________________________________________________     Anticoagulation Episode Summary       Current INR goal:  2.0-3.0   TTR:  77.4% (1 y)   Target end date:  Indefinite   [facility]     Indications    Atrial fibrillation (H) [I48.91]  Long term current use of anticoagulant therapy [Z79.01]  Chronic atrial fibrillation (H) [I48.20]             Comments:  --             Anticoagulation Care Providers       Provider Role Specialty Phone number    Katie Martino MD Referring Family Medicine [phone]    Brianna Matute APRN CNP Referring Family Medicine [phone]    Myrna Clark MD Referring [facility] Medicine [phone]

## 2025-05-22 ENCOUNTER — PRE VISIT (OUTPATIENT)
Dept: NEUROSURGERY | Facility: CLINIC | Age: 80
End: 2025-05-22

## 2025-05-22 ENCOUNTER — VIRTUAL VISIT (OUTPATIENT)
Dept: NEUROSURGERY | Facility: CLINIC | Age: 80
End: 2025-05-22
Attending: EMERGENCY MEDICINE
Payer: COMMERCIAL

## 2025-05-22 VITALS — HEIGHT: 69 IN | WEIGHT: 215 LBS | BODY MASS INDEX: 31.84 KG/M2

## 2025-05-22 DIAGNOSIS — I72.5 BASILAR ARTERY ANEURYSM: ICD-10-CM

## 2025-05-22 ASSESSMENT — PAIN SCALES - GENERAL: PAINLEVEL_OUTOF10: NO PAIN (0)

## 2025-05-22 NOTE — LETTER
"2025       RE: Antoine Russo  [address] [address]     Dear Colleague,    Thank you for referring your patient, Antoine Russo, to the [facility] NEUROSURGERY CLINIC [place] [facility]. Please see a copy of my visit note below.    Virtual Visit Details    Type of service:  Video Visit   Video Start Time: 804  Video End Time:8:25 AM    Originating Location (pt. Location): Home    Distant Location (provider location):  Off-site  Platform used for Video Visit: [facility]  Department of Neurosurgery      Name: Antoine Russo  MRN: [id number]  Age: 79 year old  : 1945  Referring provider: Armand Anaya  2025      Chief Complaint:   Cerebral aneurysm, nonruptured  New patient    History of Present Illness:   Antoine Russo is a 79 year old male with a history of hypertension, type 2 diabetes, atrial fibrillation, on Coumadin who is seen today for recent finding of a brain aneurysm.    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  Today I had a video visit with the patient.  Patient's sister has a history of subarachnoid hemorrhage from a ruptured aneurysm.  Patient has hypertension and is on metoprolol and lisinopril.  He was a smoker in the past and quit smoking in .      Review of Systems:   Pertinent items are noted in HPI or as in patient entered ROS below, remainder of complete ROS is negative.       2017     9:55 AM    ENT ROS   Constitutional Problems with sleep        Physical Exam:   Ht 1.753 m (5' 9\")   Wt 97.5 kg (215 lb)   BMI 31.75 kg/m     General: No acute distress.    Neuro: The patient is fully oriented. Speech is normal. Psych: Normal mood and affect. Behavior is normal.        Imagin2025 MRA brain:  HEAD " MRA:  1.  No aneurysm, high flow AVM or significant stenosis identified.  2.  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      Assessment:  Cerebral aneurysm, nonruptured  New patient    Plan:  79-year-old male with recent incidental finding of a small basilar aneurysm.  Patient has a family history of brain aneurysm.  He is hypertensive and is on medications.    Today we had a detailed discussion about brain aneurysms, risk factors, management options. We discussed that the aneurysm finding was incidental and not the cause for any of his presenting symptoms at the time of imaging.     I will review imaging with Dr. Le and will contact the patient with follow-up recommendations.       I spent 14 minutes on patient care activities related to this encounter on the date of service, including time spent reviewing the chart, obtaining history and examination and in counseling the patient, and in documentation in the electronic medical record.      Fouzia KING CNP  Department of Neurosurgery      Again, thank you for allowing me to participate in the care of your patient.      Sincerely,    FERNANDO Grant CNP

## 2025-05-22 NOTE — PROGRESS NOTES
"Virtual Visit Details    Type of service:  Video Visit   Video Start Time: 804  Video End Time:8:25 AM    Originating Location (pt. Location): Home    Distant Location (provider location):  Off-site  Platform used for Video Visit: [facility]  Department of Neurosurgery      Name: Antoine Russo  MRN: [id number]  Age: 79 year old  : 1945  Referring provider: Armand Anaya  2025      Chief Complaint:   Cerebral aneurysm, nonruptured  New patient    History of Present Illness:   Antoine Russo is a 79 year old male with a history of hypertension, type 2 diabetes, atrial fibrillation, on Coumadin who is seen today for recent finding of a brain aneurysm.    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  Today I had a video visit with the patient.  Patient's sister has a history of subarachnoid hemorrhage from a ruptured aneurysm.  Patient has hypertension and is on metoprolol and lisinopril.  He was a smoker in the past and quit smoking in .      Review of Systems:   Pertinent items are noted in HPI or as in patient entered ROS below, remainder of complete ROS is negative.       2017     9:55 AM   [facility] ROS   Constitutional Problems with sleep        Physical Exam:   Ht 1.753 m (5' 9\")   Wt 97.5 kg (215 lb)   BMI 31.75 kg/m     General: No acute distress.    Neuro: The patient is fully oriented. Speech is normal. Psych: Normal mood and affect. Behavior is normal.        Imagin2025 MRA brain:  HEAD MRA:  1.  No aneurysm, high flow AVM or significant stenosis identified.  2.  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      Assessment:  Cerebral aneurysm, nonruptured  New patient    Plan:  79-year-old male with recent incidental finding of a small basilar aneurysm. "  Patient has a family history of brain aneurysm.  He is hypertensive and is on medications.    Today we had a detailed discussion about brain aneurysms, risk factors, management options. We discussed that the aneurysm finding was incidental and not the cause for any of his presenting symptoms at the time of imaging.     I will review imaging with Dr. Le and will contact the patient with follow-up recommendations.    Addendum: Per Dr. Le who reviewed recent MRA, aneurysm appears to be an SCA aneurysm.  Patient to be scheduled for an in person visit with him to discuss next steps.       I spent 14 minutes on patient care activities related to this encounter on the date of service, including time spent reviewing the chart, obtaining history and examination and in counseling the patient, and in documentation in the electronic medical record.      Fouzia KING CNP  Department of Neurosurgery

## 2025-05-22 NOTE — NURSING NOTE
Current patient location: [address] [place] [id number]    Is the patient currently in the state of [place]? YES    Visit mode: VIDEO    If the visit is dropped, the patient can be reconnected by:VIDEO VISIT: Send to e-mail at: [email or web address]    Will anyone else be joining the visit? NO  (If patient encounters technical issues they should call [phone]    Are changes needed to the allergy or medication list? No    Are refills needed on medications prescribed by this physician? NO    Rooming Documentation:  Questionnaire(s) completed    Reason for visit: Consult    Madelaine RUIZ

## 2025-05-30 ENCOUNTER — DOCUMENTATION ONLY (OUTPATIENT)
Dept: EMERGENCY MEDICINE | Facility: CLINIC | Age: 80
End: 2025-05-30
Payer: COMMERCIAL

## 2025-05-30 DIAGNOSIS — G56.01 CARPAL TUNNEL SYNDROME, RIGHT: Primary | ICD-10-CM

## 2025-06-02 ENCOUNTER — TELEPHONE (OUTPATIENT)
Dept: FAMILY MEDICINE | Facility: CLINIC | Age: 80
End: 2025-06-02
Payer: COMMERCIAL

## 2025-06-02 DIAGNOSIS — Z79.4 TYPE 2 DIABETES MELLITUS WITH DIABETIC NEUROPATHY, WITH LONG-TERM CURRENT USE OF INSULIN (H): ICD-10-CM

## 2025-06-02 DIAGNOSIS — E11.40 TYPE 2 DIABETES MELLITUS WITH DIABETIC NEUROPATHY, WITH LONG-TERM CURRENT USE OF INSULIN (H): ICD-10-CM

## 2025-06-02 NOTE — TELEPHONE ENCOUNTER
REASON FOR VISIT: Cerebrovascular, Basilar artery aneurysm    PROVIDER: Dr. Le   DATE OF APPT: 25   NOTES (FOR ALL VISITS) STATUS DETAILS   OFFICE NOTE from referring provider Internal Fouzia Felipe NP @ [facility] Neurosur25   HOSPITAL ENCOUNTERS Internal [facility]:  25   MEDICATION LIST Internal    IMAGING  (FOR ALL VISITS)     MRI (HEAD, NECK, SPINE) Internal [facility]  25 MR Brain  25 MRA Brain (COW)  25 MRA Neck (Carotid)

## 2025-06-02 NOTE — TELEPHONE ENCOUNTER
"[facility] pharmacy calling.    Patient is at [facility] in [place] looking to  his Jardiance which he is currently out of.      \"The patient is stating he is supposed to be on 25 mg Jardiance, but the 10 mg is what we have ready for him?..\"      Looks like 25 mg Jardiance was ordered for pt on 12-3-24 by PCP, Dr. Clark, but order was discontinued on 5-9-25 by Neurologist, Dr. Singer?    I do not see anything in Neurology dictation advising that this dose be decreased.     10 mg Jardiance order form 11- is still active in chart.     Please advise/clarify. Sylvie Aguilar RN      "

## 2025-06-03 ENCOUNTER — TELEPHONE (OUTPATIENT)
Dept: FAMILY MEDICINE | Facility: CLINIC | Age: 80
End: 2025-06-03
Payer: COMMERCIAL

## 2025-06-03 DIAGNOSIS — Z79.4 TYPE 2 DIABETES MELLITUS WITH DIABETIC NEUROPATHY, WITH LONG-TERM CURRENT USE OF INSULIN (H): ICD-10-CM

## 2025-06-03 DIAGNOSIS — E11.40 TYPE 2 DIABETES MELLITUS WITH DIABETIC NEUROPATHY, WITH LONG-TERM CURRENT USE OF INSULIN (H): ICD-10-CM

## 2025-06-05 ENCOUNTER — TELEPHONE (OUTPATIENT)
Dept: EDUCATION SERVICES | Facility: CLINIC | Age: 80
End: 2025-06-05
Payer: COMMERCIAL

## 2025-06-05 DIAGNOSIS — Z79.4 TYPE 2 DIABETES MELLITUS WITH DIABETIC NEUROPATHY, WITH LONG-TERM CURRENT USE OF INSULIN (H): ICD-10-CM

## 2025-06-05 DIAGNOSIS — E11.40 TYPE 2 DIABETES MELLITUS WITH DIABETIC NEUROPATHY, WITH LONG-TERM CURRENT USE OF INSULIN (H): ICD-10-CM

## 2025-06-05 NOTE — TELEPHONE ENCOUNTER
Diabetes education contact:    Patient unable to afford three month supply of jardiance.  Resent it as 30 days at a time.    Ruthann Alaniz RD, [facility]    Any diabetes medication initiation or dose changes were made via the [facility] Standing Orders per the patient's referring provider. A copy of this encounter was shared with the provider.

## 2025-06-06 ENCOUNTER — RESULTS FOLLOW-UP (OUTPATIENT)
Dept: ANTICOAGULATION | Facility: CLINIC | Age: 80
End: 2025-06-06

## 2025-06-19 DIAGNOSIS — E11.40 TYPE 2 DIABETES MELLITUS WITH DIABETIC NEUROPATHY, WITH LONG-TERM CURRENT USE OF INSULIN (H): ICD-10-CM

## 2025-06-19 DIAGNOSIS — Z79.4 TYPE 2 DIABETES MELLITUS WITH DIABETIC NEUROPATHY, WITH LONG-TERM CURRENT USE OF INSULIN (H): ICD-10-CM

## 2025-06-19 RX ORDER — PREGABALIN 150 MG/1
150 CAPSULE ORAL 2 TIMES DAILY
Qty: 180 CAPSULE | Refills: 1 | Status: SHIPPED | OUTPATIENT
Start: 2025-06-19

## 2025-06-20 ENCOUNTER — RESULTS FOLLOW-UP (OUTPATIENT)
Dept: ANTICOAGULATION | Facility: CLINIC | Age: 80
End: 2025-06-20

## 2025-06-24 ENCOUNTER — TELEPHONE (OUTPATIENT)
Dept: NEUROSURGERY | Facility: CLINIC | Age: 80
End: 2025-06-24

## 2025-06-24 ENCOUNTER — PRE VISIT (OUTPATIENT)
Dept: NEUROSURGERY | Facility: CLINIC | Age: 80
End: 2025-06-24

## 2025-06-24 NOTE — TELEPHONE ENCOUNTER
Patient canceled appointment with Dr. Le today as he did not want to drive 130 miles round trip for consultation. Patient lives in [place], [place].     Spoke with patient. Informed that we can do a virtual visit. Rescheduled appointment for phone on 7/8/25 at 0730/0784. Patient was agreeable to this appointment.     Sheila Baltazar RN 6/24/2025 9:59 AM

## 2025-07-03 ENCOUNTER — ANTICOAGULATION THERAPY VISIT (OUTPATIENT)
Dept: ANTICOAGULATION | Facility: CLINIC | Age: 80
End: 2025-07-03

## 2025-07-03 ENCOUNTER — LAB (OUTPATIENT)
Dept: LAB | Facility: CLINIC | Age: 80
End: 2025-07-03
Payer: COMMERCIAL

## 2025-07-03 ENCOUNTER — RESULTS FOLLOW-UP (OUTPATIENT)
Dept: ANTICOAGULATION | Facility: CLINIC | Age: 80
End: 2025-07-03

## 2025-07-03 DIAGNOSIS — I48.20 CHRONIC ATRIAL FIBRILLATION (H): ICD-10-CM

## 2025-07-03 DIAGNOSIS — Z79.01 LONG TERM CURRENT USE OF ANTICOAGULANT THERAPY: Chronic | ICD-10-CM

## 2025-07-03 DIAGNOSIS — Z79.01 LONG TERM CURRENT USE OF ANTICOAGULANT THERAPY: ICD-10-CM

## 2025-07-03 DIAGNOSIS — I48.20 CHRONIC ATRIAL FIBRILLATION (H): Primary | Chronic | ICD-10-CM

## 2025-07-03 LAB — INR BLD: 2.2 (ref 0.9–1.1)

## 2025-07-03 NOTE — PROGRESS NOTES
ANTICOAGULATION MANAGEMENT     Antoine Russo 79 year old male is on warfarin with therapeutic INR result. (Goal INR 2.0-3.0)    Recent labs: (last 7 days)     07/03/25  0750   INR 2.2*       ASSESSMENT     Warfarin Lab Questionnaire    Warfarin Doses Last 7 Days      7/2/2025     5:22 PM   Dose in Tablet or Mg   TAB or MG? milligram (mg)     Pt Rptd Dose SUNDAY MONDAY TUESDAY WED THURS FRIDAY SATURDAY 7/2/2025   5:22 PM 1.25 2.5 1.25 2.5 1.25 2.5 1.25         7/2/2025   Warfarin Lab Questionnaire   Missed doses within past 14 days? No   Changes in diet or alcohol within past 14 days? No   Medication changes since last result? No   Injuries or illness since last result? No   New shortness of breath, severe headaches or sudden changes in vision since last result? No   Abnormal bleeding since last result? No   Upcoming surgery, procedure? No   Best number to call with results? [phone]     Previous result: Therapeutic last visit; previously outside of goal range  Additional findings: None     PLAN     Recommended plan for no diet, medication or health factor changes affecting INR     Dosing Instructions: Continue your current warfarin dose with next INR in 3 weeks       Summary  As of 7/3/2025      Full warfarin instructions:  2.5 mg every Mon, Wed, Fri; 1.25 mg all other days   Next INR check:  7/24/2025               Telephone call with Poli or Saleem who verbalizes understanding and agrees to plan    Lab visit scheduled    Education provided: Please call back if any changes to your diet, medications or how you've been taking warfarin    Plan made per [facility] anticoagulation protocol    Madhavi Sanders RN  7/3/2025  Anticoagulation Clinic  [facility] for routing messages: christos CANDELARIA [facility] patient phone line: [phone]        _______________________________________________________________________     Anticoagulation Episode Summary       Current INR goal:  2.0-3.0   TTR:  73.3% (1 y)   Target end  date:  Indefinite   Send INR reminders to:  [facility]    Indications    Atrial fibrillation (H) [I48.91]  Long term current use of anticoagulant therapy [Z79.01]  Chronic atrial fibrillation (H) [I48.20]             Comments:  --             Anticoagulation Care Providers       Provider Role Specialty Phone number    Katie Martino MD Referring Family Medicine [phone]    Brianna Matute APRN CNP Referring Family Medicine [phone]    Myrna Clark MD Referring [facility] Medicine [phone]

## 2025-07-04 DIAGNOSIS — I48.20 CHRONIC ATRIAL FIBRILLATION (H): ICD-10-CM

## 2025-07-07 RX ORDER — WARFARIN SODIUM 2.5 MG/1
1.25-2.5 TABLET ORAL EVERY EVENING
Qty: 70 TABLET | Refills: 1 | Status: SHIPPED | OUTPATIENT
Start: 2025-07-07

## 2025-07-07 NOTE — TELEPHONE ENCOUNTER
ANTICOAGULATION MANAGEMENT:  Medication Refill    Anticoagulation Summary  As of 7/3/2025      Warfarin maintenance plan:  2.5 mg (2.5 mg x 1) every Mon, Wed, Fri; 1.25 mg (2.5 mg x 0.5) all other days   Next INR check:  7/24/2025   Target end date:  Indefinite    Indications    Atrial fibrillation (H) [I48.91]  Long term current use of anticoagulant therapy [Z79.01]  Chronic atrial fibrillation (H) [I48.20]                 Anticoagulation Care Providers       Provider Role Specialty Phone number    Katie Martino MD Referring Family Medicine [phone]    Brianna Matute APRN CNP Referring Family Medicine [phone]    Myrna Clark MD Referring [facility] [phone]            Refill Criteria    Visit with referring provider/group: Meets criteria: visit within referring provider group in the last 15 months on 5/19/25    [facility] referral last signed: 02/14/2025; within last year:  Yes    Lab monitoring is up to date (not exceeding 2 weeks overdue): Yes    Antoine meets all criteria for refill. Rx instructions and quantity supplied updated to match patient's current dosing plan. Warfarin 90 day supply with 1 refill granted per [facility] protocol     Maria Victoria Castaneda RN  Anticoagulation Clinic

## 2025-07-08 ENCOUNTER — VIRTUAL VISIT (OUTPATIENT)
Dept: NEUROSURGERY | Facility: CLINIC | Age: 80
End: 2025-07-08
Payer: COMMERCIAL

## 2025-07-08 ENCOUNTER — TELEPHONE (OUTPATIENT)
Dept: RADIOLOGY | Facility: CLINIC | Age: 80
End: 2025-07-08

## 2025-07-08 VITALS — HEIGHT: 69 IN | WEIGHT: 205 LBS | BODY MASS INDEX: 30.36 KG/M2

## 2025-07-08 DIAGNOSIS — I67.1 CEREBRAL ANEURYSM, NONRUPTURED: Primary | ICD-10-CM

## 2025-07-08 ASSESSMENT — PAIN SCALES - GENERAL: PAINLEVEL_OUTOF10: NO PAIN (0)

## 2025-07-08 NOTE — PROGRESS NOTES
"Virtual Visit Details    Type of service:  Telephone Visit   Phone call duration: *** minutes   Originating Location (pt. Location): {patient location:428023::\"Home\"}  {PROVIDER LOCATION On-site should be selected for visits conducted from your clinic location or adjoining [facility] hospital, academic office, or other nearby [facility] building. Off-site should be selected for all other provider locations, including home:057546}  Distant Location (provider location):  {virtual location provider:577629}  Telephone visit completed due to {audio only reason:352071}  "

## 2025-07-08 NOTE — PATIENT INSTRUCTIONS
Plan for diagnostic angiogram. We will contact you to schedule.     Stroke & Endovascular RN Care Coordinators:    Agnes Rodriguez, RN, BSN  Sheila Baltazar, RN, CNRN, SCRN    If you have any questions please contact the RN Care Coordinators at [phone], option 1.    After business hours call the  at [phone] and have the Neuro-Interventional Fellow paged.    Thank you for choosing [facility] for your health care needs.

## 2025-07-08 NOTE — LETTER
7/8/2025       RE: Antoine Russo  [address] [address]     Dear Colleague,    Thank you for referring your patient, Antoine Russo, to the [facility] NEUROSURGERY CLINIC [place] [facility]. Please see a copy of my visit note below.    Neuroendovascular clinic note  Date of visit 7/8    HPI: Antoine Russo is a 79 year old male with a history of hypertension, type 2 diabetes, PAD, atrial fibrillation on Coumadin who is seen today for recent finding of a brain aneurysm. He had a few days of numbness tingling in the radial nerve distribution and concerned about CVA which prompted MRI. His symptoms resolved a few days after the onset. Denies HA, weakness. Sister had an aneurysm rupture requiring treatment. He is not on home oxygen. Able to do daily activities except not able to walk long due to PAD.    DM, Afib, PDA    Used to smoke quit in 2006    Today on the telephone visit, his speech is clear and coherent    A/P: 79 year old man with PMH, afib on warfarin, PAD, with history of smoking, and FH of aneurysm, found to have left sca aneurysm measuring 3mm.     Plan for diagnostic angiogram    Patient discussed with Dr. Le.   Ajay Vyas  Neuroendovascular fellow     Attestation signed by Reji Le MD at 7/10/2025  9:59 AM:  I agree with the above note by Dr. Vyas        on  7/08/25    Again, thank you for allowing me to participate in the care of your patient.      Sincerely,    Reji Le MD

## 2025-07-08 NOTE — TELEPHONE ENCOUNTER
called patient in regards to IR procedure with Dr. Reji Le    Procedure Date: 7/28/25 (patient declined 7/24 & 7/25)    Arrival: 6:30am    Loction: [place] IR Suite    Pre op: Not Needed    Post op: To be scheduled by RN    Anesthesia Type: IV Sedation    Patient was informed that they will be at the hospital 5-6 hours and that they will need a . Patient verbalized understanding.    Notes: N/A        Joya Clemens MA on 7/8/2025 at 2:47 PM

## 2025-07-08 NOTE — NURSING NOTE
Current patient location: [address] [place] [id number]    Is the patient currently in the state of [place]? YES    Visit mode: TELEPHONE    If the visit is dropped, the patient can be reconnected by:TELEPHONE VISIT: Phone number: [phone]    Will anyone else be joining the visit? NO  (If patient encounters technical issues they should call [phone] :[phone])    Are changes needed to the allergy or medication list? Pt stated no changes to allergies and Pt stated no med changes    Are refills needed on medications prescribed by this physician? NO    Rooming Documentation:  Not applicable    Reason for visit: Consult    Pamela RUIZ

## 2025-07-08 NOTE — PROGRESS NOTES
Neuroendovascular clinic note  Date of visit 7/8    HPI: Antoine Russo is a 79 year old male with a history of hypertension, type 2 diabetes, PAD, atrial fibrillation on Coumadin who is seen today for recent finding of a brain aneurysm. He had a few days of numbness tingling in the radial nerve distribution and concerned about CVA which prompted MRI. His symptoms resolved a few days after the onset. Denies HA, weakness. Sister had an aneurysm rupture requiring treatment. He is not on home oxygen. Able to do daily activities except not able to walk long due to PAD.    DM, Afib, PDA    Used to smoke quit in 2006    Today on the telephone visit, his speech is clear and coherent    A/P: 79 year old man with PMH, afib on warfarin, PAD, with history of smoking, and FH of aneurysm, found to have left sca aneurysm measuring 3mm.     Plan for diagnostic angiogram    Patient discussed with Dr. Le.   Ajay Vyas  Neuroendovascular fellow

## 2025-07-08 NOTE — TELEPHONE ENCOUNTER
Phoned patient to discuss scheduling his procedure with Dr Le. Patient stated he is open to whatever is available in the coming weeks. Discussed possibility of 7/24 or 7/25. Patient declined 7/24 as he has another appt that day. Declined 7/25 as the procedure would likely be in the afternoon and patient prefers early morning. Alternative suggested possibly the morning of 7/28. Patient agreeable to 7/28, will wait for call back from  to confirm if date is available.

## 2025-07-15 ENCOUNTER — PATIENT OUTREACH (OUTPATIENT)
Dept: NEUROSURGERY | Facility: CLINIC | Age: 80
End: 2025-07-15
Payer: COMMERCIAL

## 2025-07-15 NOTE — TELEPHONE ENCOUNTER
Spoke with patient. States now is not a good time for him to review instructions. RN will send them via Pikhub and call patient back on Thursday morning. Patient agreed with that plan.     Sheila Baltazar RN 7/15/2025 2:40 PM     Addendum: Informed patient of procedure instructions. Confirmed date and time. Patient verbalized understanding. All questions answered. Patient has my contact information and was encouraged to call with questions/concerns.      Sheila Baltazar RN 7/17/2025 10:46 AM

## 2025-07-15 NOTE — PATIENT INSTRUCTIONS
You are scheduled for a Diagnostic Cerebral Angiogram with Dr. Le on 7/28/25. Arrival Time: 6:30 am. Procedure Time: 8:00 am.    Please follow these instructions:    * Check in at the [facility] Waiting Room at the [facility]. The address is [address] [place] [address]. The phone number is [phone].     * Nothing to eat for 8 hours prior to arrival time. You may drink clear liquids (includes water, Jell-O, clear broth, apple juice or any non-carbonated beverage that you can see through) up until 2 hours prior to arrival time.     * Contact your endocrinologist for directions on insulin administration for procedure prep.     * HOLD Jardiance 3 days prior to your procedure    * You may take your other medications, including warfarin, with a sip of water the morning of the procedure.     * Please note: if you are taking a GLP-1 agonist you must HOLD as follows:   Hold seven (7) days prior for once weekly injectable doses [semaglutide (Ozempic, Wegovy), dulaglutide  (Trulicity), exenatide ER (Bydureon), tirzepatide (Mounjaro)]   Hold the day before and day of for once daily injectable GLP-1 agonists [exenatide (Byetta), liraglutide (Saxenda,  Victoza)]   Hold seven (7) days for oral semaglutide (Rybelsus)     * You will be discharged the same day. You must have a  home and someone that can stay with you through the night.     COVID-19 visitors policy: Adult surgical and procedural patients can have two visitors throughout the surgery process. The two visitors may include children of any age; please note, children cannot stay in the waiting room alone.    All discharge instructions will be given to the  or volunteer. Documentation for the post-operative plan will be given to the patient and . Patients are required to have someone to stay with them for 24 hours after their procedure.    If you have questions regarding your procedure, please contact  me at [phone], option 1.    If you need to cancel, reschedule or have procedure scheduling related questions, please call Neuroendovascular IR Procedure Scheduling at [phone].    Thank you,  Sheila Baltazar, RN, CNRN, SCRN  Agnes Rodriguez, RN, BSN  Stroke & Neuroendovascular Care Coordinator     Cerebral Angiogram - What to Expect      You are scheduled for an angiogram, which is a procedure that uses x-rays to view the arteries (blood vessels that carry blood from the heart out to the body).     After you arrive, the nursing staff will take a short history and assessment before the procedure begins. Your physician will explain the procedure to you and your family, including the possible risks and side effects.  Be sure to ask questions and address any concerns you may have. You will then be asked to sign a consent form for the procedure.     During the procedure a small tube or catheter will be placed into one of your arteries (either the femoral artery in the groin, or the radial artery in the arm)  and maneuvered to the specific artery being studied.  Contrast medium (x-ray dye) will be injected into the blood vessel and x-rays will be taken of the area.    Once the procedure is completed the catheter will be removed and pressure held at the puncture site for 20 to 30 minutes to make sure the puncture site seals. An internal closure device may be used to secure the arterial puncture as well.     During the exam, you are routinely monitored by a heart monitor and an oxygen saturation monitor that lets us know how well you are breathing.  A blood pressure cuff will be on your arm.  An IV is started to provide you with medications and IV fluids during the procedure.    After the exam you will need to be on complete bed rest for 2 to 6 hours.  The nurse will monitor your vital signs, puncture site, pulses and temperature of the area that was punctured.     After you are discharged do not drive until the  next morning and an adult must be with you until then. You may resume your normal activities the day after the procedure.  Do not do any strenuous exercise or lifting for 48 hours after the procedure.     Preparation:   Laboratory tests will be obtained by your doctor the day of the exam (Basic Metabolic Panel, CBCP, PTT and INR).  Someone will need to drive you to and from the hospital.  Be sure to have someone who can stay with you through the night.   If you are allergic to x-ray contrast or iodine, contact your doctor or the nurse coordinator prior to the exam day for instructions.  If you are or may be pregnant contact your doctor or nurse coordinator prior to the day of the exam.  If you have diabetes, check with your doctor or the RN Care Coordinator to see if your insulin should be adjusted for the exam.  If you are taking a medication called Glucophage, Glucovance, or Metformin; these medications need to be held the day of the exam and for approximately 48 hours following the procedure.   If you are taking a GLP-1 agonist, please hold as instructed above.   If you are taking Coumadin (warfarin), Eliquis (apixaban), Xarelto (rivaroxaban), or any other blood thinners/anticoagulants, please contact the RN Care Coordinator at least 7 days before the exam for special instructions.  You should not have received barium (x-ray contrast) within 48 hours of this procedure.   Do not smoke for 24 hours prior to the procedure.  Do not eat for 8 hours prior to arrival time. You may drink clear liquids (water, ginger ale, etc) until 2 hours prior to arrival time.  You may brush your teeth and take your medications as directed with a sip of water.    Feel free to contact Sheila Baltazar or Agnes Rodriguez, our RN Care Coordinators, at [phone], option 1, with any questions or concerns. After business hours, call the  at [phone] and have the Neuro-Interventional Fellow paged.

## 2025-07-24 ENCOUNTER — ANTICOAGULATION THERAPY VISIT (OUTPATIENT)
Dept: ANTICOAGULATION | Facility: CLINIC | Age: 80
End: 2025-07-24

## 2025-07-24 ENCOUNTER — LAB (OUTPATIENT)
Dept: LAB | Facility: CLINIC | Age: 80
End: 2025-07-24
Payer: COMMERCIAL

## 2025-07-24 DIAGNOSIS — I48.20 CHRONIC ATRIAL FIBRILLATION (H): Primary | Chronic | ICD-10-CM

## 2025-07-24 DIAGNOSIS — Z79.01 LONG TERM CURRENT USE OF ANTICOAGULANT THERAPY: Chronic | ICD-10-CM

## 2025-07-24 DIAGNOSIS — I48.20 CHRONIC ATRIAL FIBRILLATION (H): ICD-10-CM

## 2025-07-24 DIAGNOSIS — Z79.01 LONG TERM CURRENT USE OF ANTICOAGULANT THERAPY: ICD-10-CM

## 2025-07-24 LAB — INR BLD: 2.9 (ref 0.9–1.1)

## 2025-07-24 NOTE — PROGRESS NOTES
ANTICOAGULATION MANAGEMENT     Antoine Russo 79 year old male is on warfarin with therapeutic INR result. (Goal INR 2.0-3.0)    Recent labs: (last 7 days)     07/24/25  0727   INR 2.9*       ASSESSMENT     Source(s): Chart Review and Patient/Caregiver Call     Warfarin doses taken: Warfarin taken as instructed  Diet: No new diet changes identified  Medication/supplement changes: None noted  New illness, injury, or hospitalization: No  Signs or symptoms of bleeding or clotting: No  Previous result: Therapeutic last 2(+) visits  Additional findings: Upcoming surgery/procedure Cerebral angiogram 7/28/25   * You may take your other medications, including warfarin, with a sip of water the morning of the procedure.   Reviewed with Brianna Robles [facility]     PLAN     Recommended plan for no diet, medication or health factor changes affecting INR     Dosing Instructions: Continue your current warfarin dose with next INR in 3 weeks       Summary  As of 7/24/2025      Full warfarin instructions:  2.5 mg every Mon, Wed, Fri; 1.25 mg all other days   Next INR check:  8/14/2025               Telephone call with Poli or Saleem who verbalizes understanding and agrees to plan    Lab visit scheduled    Education provided: Please call back if any changes to your diet, medications or how you've been taking warfarin    Plan made with [facility] Pharmacist Brianna Sanders RN  7/24/2025  Anticoagulation Clinic  [employer] [facility] for routing messages: p [facility]  [facility] patient phone line: [phone]        _______________________________________________________________________     Anticoagulation Episode Summary       Current INR goal:  2.0-3.0   TTR:  78.2% (1 y)   Target end date:  Indefinite   Send INR reminders to:  [facility]    Indications    Atrial fibrillation (H) [I48.91]  Long term current use of anticoagulant therapy [Z79.01]  Chronic atrial fibrillation (H) [I48.20]             Comments:  --              Anticoagulation Care Providers       Provider Role Specialty Phone number    Katie Martino MD Referring Family Medicine [phone]    Brianna Matute APRN [facility] Referring Family Medicine [phone]    Myrna Clark MD Referring Family Medicine [phone]

## 2025-07-28 ENCOUNTER — APPOINTMENT (OUTPATIENT)
Dept: INTERVENTIONAL RADIOLOGY/VASCULAR | Facility: CLINIC | Age: 80
End: 2025-07-28
Attending: RADIOLOGY
Payer: COMMERCIAL

## 2025-07-28 ENCOUNTER — HOSPITAL ENCOUNTER (OUTPATIENT)
Facility: CLINIC | Age: 80
Discharge: HOME | End: 2025-07-28
Attending: RADIOLOGY | Admitting: NEUROLOGICAL SURGERY
Payer: COMMERCIAL

## 2025-07-28 ENCOUNTER — DOCUMENTATION ONLY (OUTPATIENT)
Dept: ANTICOAGULATION | Facility: CLINIC | Age: 80
End: 2025-07-28

## 2025-07-28 VITALS
SYSTOLIC BLOOD PRESSURE: 142 MMHG | BODY MASS INDEX: 31.06 KG/M2 | RESPIRATION RATE: 22 BRPM | HEART RATE: 72 BPM | OXYGEN SATURATION: 89 % | TEMPERATURE: 97.9 F | DIASTOLIC BLOOD PRESSURE: 99 MMHG | WEIGHT: 210.32 LBS

## 2025-07-28 DIAGNOSIS — I67.1 CEREBRAL ANEURYSM, NONRUPTURED: ICD-10-CM

## 2025-07-28 LAB
ANION GAP SERPL CALCULATED.3IONS-SCNC: 12 MMOL/L (ref 7–15)
APTT PPP: 43 SECONDS (ref 22–38)
BUN SERPL-MCNC: 16.9 MG/DL (ref 8–23)
BURR CELLS BLD QL SMEAR: SLIGHT
CALCIUM SERPL-MCNC: 10.1 MG/DL (ref 8.8–10.4)
CHLORIDE SERPL-SCNC: 101 MMOL/L (ref 98–107)
CREAT SERPL-MCNC: 0.98 MG/DL (ref 0.67–1.17)
EGFRCR SERPLBLD CKD-EPI 2021: 79 ML/MIN/1.73M2
ERYTHROCYTE [DISTWIDTH] IN BLOOD BY AUTOMATED COUNT: 19.8 % (ref 10–15)
GLUCOSE SERPL-MCNC: 232 MG/DL (ref 70–99)
HCO3 SERPL-SCNC: 24 MMOL/L (ref 22–29)
HCT VFR BLD AUTO: 47.3 % (ref 40–53)
HGB BLD-MCNC: 15.2 G/DL (ref 13.3–17.7)
HOWELL-JOLLY BOD BLD QL SMEAR: PRESENT
INR PPP: 2.93 (ref 0.85–1.15)
MCH RBC QN AUTO: 32.3 PG (ref 26.5–33)
MCHC RBC AUTO-ENTMCNC: 32.1 G/DL (ref 31.5–36.5)
MCV RBC AUTO: 100 FL (ref 78–100)
PLAT MORPH BLD: ABNORMAL
PLATELET # BLD AUTO: 171 10E3/UL (ref 150–450)
POLYCHROMASIA BLD QL SMEAR: SLIGHT
POTASSIUM SERPL-SCNC: 4.7 MMOL/L (ref 3.4–5.3)
PROTHROMBIN TIME: 29.8 SECONDS (ref 11.8–14.8)
RBC # BLD AUTO: 4.71 10E6/UL (ref 4.4–5.9)
RBC MORPH BLD: ABNORMAL
SODIUM SERPL-SCNC: 137 MMOL/L (ref 135–145)
TARGETS BLD QL SMEAR: SLIGHT
WBC # BLD AUTO: 7.3 10E3/UL (ref 4–11)

## 2025-07-28 PROCEDURE — 36415 COLL VENOUS BLD VENIPUNCTURE: CPT | Performed by: STUDENT IN AN ORGANIZED HEALTH CARE EDUCATION/TRAINING PROGRAM

## 2025-07-28 PROCEDURE — C1769 GUIDE WIRE: HCPCS

## 2025-07-28 PROCEDURE — 272N000280 HC DEVICE COMPRESSION CR5

## 2025-07-28 PROCEDURE — 99152 MOD SED SAME PHYS/QHP 5/>YRS: CPT

## 2025-07-28 PROCEDURE — 250N000011 HC RX IP 250 OP 636: Performed by: STUDENT IN AN ORGANIZED HEALTH CARE EDUCATION/TRAINING PROGRAM

## 2025-07-28 PROCEDURE — 272N000570 HC SHEATH CR7

## 2025-07-28 PROCEDURE — 36226 PLACE CATH VERTEBRAL ART: CPT | Mod: RT | Performed by: NEUROLOGICAL SURGERY

## 2025-07-28 PROCEDURE — 99152 MOD SED SAME PHYS/QHP 5/>YRS: CPT | Mod: GC | Performed by: NEUROLOGICAL SURGERY

## 2025-07-28 PROCEDURE — 85610 PROTHROMBIN TIME: CPT | Performed by: STUDENT IN AN ORGANIZED HEALTH CARE EDUCATION/TRAINING PROGRAM

## 2025-07-28 PROCEDURE — 250N000009 HC RX 250: Performed by: STUDENT IN AN ORGANIZED HEALTH CARE EDUCATION/TRAINING PROGRAM

## 2025-07-28 PROCEDURE — 36224 PLACE CATH CAROTD ART: CPT

## 2025-07-28 PROCEDURE — C1887 CATHETER, GUIDING: HCPCS

## 2025-07-28 PROCEDURE — 85730 THROMBOPLASTIN TIME PARTIAL: CPT | Performed by: STUDENT IN AN ORGANIZED HEALTH CARE EDUCATION/TRAINING PROGRAM

## 2025-07-28 PROCEDURE — 80048 BASIC METABOLIC PNL TOTAL CA: CPT | Performed by: STUDENT IN AN ORGANIZED HEALTH CARE EDUCATION/TRAINING PROGRAM

## 2025-07-28 PROCEDURE — 85048 AUTOMATED LEUKOCYTE COUNT: CPT | Performed by: STUDENT IN AN ORGANIZED HEALTH CARE EDUCATION/TRAINING PROGRAM

## 2025-07-28 PROCEDURE — 255N000002 HC RX 255 OP 636: Performed by: RADIOLOGY

## 2025-07-28 RX ORDER — HEPARIN SODIUM 200 [USP'U]/100ML
1 INJECTION, SOLUTION INTRAVENOUS EVERY 5 MIN PRN
Status: DISCONTINUED | OUTPATIENT
Start: 2025-07-28 | End: 2025-07-28 | Stop reason: HOSPADM

## 2025-07-28 RX ORDER — FLUMAZENIL 0.1 MG/ML
0.2 INJECTION, SOLUTION INTRAVENOUS
Status: DISCONTINUED | OUTPATIENT
Start: 2025-07-28 | End: 2025-07-28 | Stop reason: HOSPADM

## 2025-07-28 RX ORDER — NALOXONE HYDROCHLORIDE 0.4 MG/ML
0.4 INJECTION, SOLUTION INTRAMUSCULAR; INTRAVENOUS; SUBCUTANEOUS
Status: DISCONTINUED | OUTPATIENT
Start: 2025-07-28 | End: 2025-07-28 | Stop reason: HOSPADM

## 2025-07-28 RX ORDER — ACETAMINOPHEN 325 MG/1
325 TABLET ORAL EVERY 4 HOURS PRN
Status: DISCONTINUED | OUTPATIENT
Start: 2025-07-28 | End: 2025-07-28 | Stop reason: HOSPADM

## 2025-07-28 RX ORDER — NALOXONE HYDROCHLORIDE 0.4 MG/ML
0.2 INJECTION, SOLUTION INTRAMUSCULAR; INTRAVENOUS; SUBCUTANEOUS
Status: DISCONTINUED | OUTPATIENT
Start: 2025-07-28 | End: 2025-07-28 | Stop reason: HOSPADM

## 2025-07-28 RX ORDER — PROCHLORPERAZINE MALEATE 5 MG/1
5 TABLET ORAL EVERY 6 HOURS PRN
Status: DISCONTINUED | OUTPATIENT
Start: 2025-07-28 | End: 2025-07-28 | Stop reason: HOSPADM

## 2025-07-28 RX ORDER — ONDANSETRON 4 MG/1
4 TABLET, ORALLY DISINTEGRATING ORAL EVERY 6 HOURS PRN
Status: DISCONTINUED | OUTPATIENT
Start: 2025-07-28 | End: 2025-07-28 | Stop reason: HOSPADM

## 2025-07-28 RX ORDER — SODIUM CHLORIDE 9 MG/ML
INJECTION, SOLUTION INTRAVENOUS CONTINUOUS
Status: DISCONTINUED | OUTPATIENT
Start: 2025-07-28 | End: 2025-07-28 | Stop reason: HOSPADM

## 2025-07-28 RX ORDER — ONDANSETRON 2 MG/ML
4 INJECTION INTRAMUSCULAR; INTRAVENOUS EVERY 6 HOURS PRN
Status: DISCONTINUED | OUTPATIENT
Start: 2025-07-28 | End: 2025-07-28 | Stop reason: HOSPADM

## 2025-07-28 RX ORDER — LIDOCAINE 40 MG/G
CREAM TOPICAL
Status: DISCONTINUED | OUTPATIENT
Start: 2025-07-28 | End: 2025-07-28 | Stop reason: HOSPADM

## 2025-07-28 RX ORDER — IODIXANOL 320 MG/ML
150 INJECTION, SOLUTION INTRAVASCULAR ONCE
Status: COMPLETED | OUTPATIENT
Start: 2025-07-28 | End: 2025-07-28

## 2025-07-28 RX ORDER — FENTANYL CITRATE 50 UG/ML
25-50 INJECTION, SOLUTION INTRAMUSCULAR; INTRAVENOUS EVERY 5 MIN PRN
Status: DISCONTINUED | OUTPATIENT
Start: 2025-07-28 | End: 2025-07-28 | Stop reason: HOSPADM

## 2025-07-28 RX ADMIN — MIDAZOLAM 0.5 MG: 1 INJECTION INTRAMUSCULAR; INTRAVENOUS at 08:48

## 2025-07-28 RX ADMIN — VERAPAMIL HYDROCHLORIDE: 2.5 INJECTION INTRAVENOUS at 08:49

## 2025-07-28 RX ADMIN — LIDOCAINE HYDROCHLORIDE 3 ML: 10 INJECTION, SOLUTION EPIDURAL; INFILTRATION; INTRACAUDAL; PERINEURAL at 08:38

## 2025-07-28 RX ADMIN — FENTANYL CITRATE 25 MCG: 50 INJECTION, SOLUTION INTRAMUSCULAR; INTRAVENOUS at 09:06

## 2025-07-28 RX ADMIN — MIDAZOLAM 0.5 MG: 1 INJECTION INTRAMUSCULAR; INTRAVENOUS at 08:37

## 2025-07-28 RX ADMIN — HEPARIN SODIUM IN SODIUM CHLORIDE 4 BAG: 200 INJECTION INTRAVENOUS at 08:30

## 2025-07-28 RX ADMIN — FENTANYL CITRATE 25 MCG: 50 INJECTION, SOLUTION INTRAMUSCULAR; INTRAVENOUS at 08:37

## 2025-07-28 RX ADMIN — MIDAZOLAM 0.5 MG: 1 INJECTION INTRAMUSCULAR; INTRAVENOUS at 09:06

## 2025-07-28 RX ADMIN — IODIXANOL 45 ML: 320 INJECTION, SOLUTION INTRAVASCULAR at 09:17

## 2025-07-28 RX ADMIN — FENTANYL CITRATE 25 MCG: 50 INJECTION, SOLUTION INTRAMUSCULAR; INTRAVENOUS at 08:48

## 2025-07-28 RX ADMIN — MIDAZOLAM 1 MG: 1 INJECTION INTRAMUSCULAR; INTRAVENOUS at 08:28

## 2025-07-28 RX ADMIN — FENTANYL CITRATE 50 MCG: 50 INJECTION, SOLUTION INTRAMUSCULAR; INTRAVENOUS at 08:28

## 2025-07-28 ASSESSMENT — ACTIVITIES OF DAILY LIVING (ADL)
ADLS_ACUITY_SCORE: 58

## 2025-07-28 NOTE — PROGRESS NOTES
Prep for Cerebral Angiogram. Pt alert and oriented. Neuros intact. Denies pain. Appropriately NPO. Took Warfarin today-  aware. Held Jardiance x 3 days. Diabetic. Left arm blood sugar monitor. Groin clipped. Pedal pulses with doppler. Labs processing.

## 2025-07-28 NOTE — PROGRESS NOTES
ANTICOAGULATION  MANAGEMENT: Discharge Review    Antoine JESSICA Russo chart reviewed for anticoagulation continuity of care    Outpatient surgery/procedure on 7/28/25 for cerebral angiogram.    Discharge disposition: Home    Results:    Recent labs: (last 7 days)     07/24/25  0727 07/28/25  0710   INR 2.9* 2.93*     Anticoagulation inpatient management:     not applicable     Anticoagulation discharge instructions:     Warfarin dosing: home regimen continued- no warfarin interruption prior to procedure   Bridging: No   INR goal change: No      Medication changes affecting anticoagulation: No    Additional factors affecting anticoagulation: No     PLAN     No adjustment to anticoagulation plan needed    Recommended follow up is scheduled  Patient not contacted    No adjustment to Anticoagulation Calendar was required    Maria Victoria Castaneda, RN  7/28/2025  Anticoagulation Clinic  [employer] [place] for routing messages: christos CANDELARIA [facility] patient phone line: [phone]

## 2025-07-28 NOTE — IR NOTE
Patient Name: Antoine Russo  Medical Record Number: [id number]  Today's Date: 7/28/2025    Procedure: diagnostic cerebral angiogram  Proceduralist: BEATA Garcia MD, DEANN Vyas MD, MYRNA Gonzalez MD,  Pathology present: n/a  Brief completed: n/a    Procedure Start: 0834  Procedure end: 0914  Sedation medications administered: 2.5 mg of versed, 125 mcg of fentanyl  Sedation time:  40 minutes     Report given to: Lizzie SANCHEZ RN  : n/a    Right radial artery site accessed at 0847. Right radial artery site de-accessed at 0912 using TR band with 9 mL of air for closure.     Other Notes: Pt arrived to IR room 03 from . Consent reviewed. Pt denies any questions or concerns regarding procedure. Pt positioned supine and monitored per protocol. Pt tolerated procedure without any noted complications. Pt transferred back to .

## 2025-07-28 NOTE — PROGRESS NOTES
S/P Cerebral Angiogram. Pt alert and oriented. Neuros baseline n/t in feet. Right radial TR band with 9cc air. CMS intact. Denies pain/nausea. Eating and drinking.

## 2025-07-28 NOTE — PROGRESS NOTES
[facility]     Endovascular Surgical Neuroradiology Post-Procedure Note    Pre-Procedure Diagnosis: cerebral aneurysm  Post-Procedure Diagnosis: same    Procedure:   Diagnostic cervicocerebral angiography    Reason for delay:  Not applicable    Findings: 3.1 x 2.3 mm right SCA aneurysm    Plan:    Bedrest 1 hour  MRA head in 1 year followed by clinic visit    Follow-up imaging: none  Blood pressure goal:   Antithrombotic plan: continue warfarin  Neuro-check frequency: q1    Primary Surgeon: Dr. Rohan Gonzalez  Secondary Surgeon: Not applicable  Secondary Surgeon Review: None  Fellow: Ajay Moy  Additional Assistants: none    Prior to the start of the procedure and with procedural staff participation, I verbally confirmed: the patient s identity using two indicators, relevant allergies, that the procedure was appropriate and matched the consent or emergent situation, and that the correct equipment/implants were available. Immediately prior to starting the procedure I conducted the Time Out with the procedural staff and re-confirmed the patient s name, procedure, and site/side. (The Joint Commission universal protocol was followed.)  Yes    PRU value: Not applicable    Anesthesia: Conscious Sedation  Medications: Heparin 3cc  Puncture site: Right Radial Artery    Fluoroscopy time (minutes): 7.7   Radiation dose (mGy): 378.2    Contrast amount (mL): 45     Estimated blood loss (mL): 1    Closure: TR band 9cc 9:12    Bedrest start time 9:14 and 1 hours bedrest completed at 10:14    Disposition: Home after recovery.        Sedation Post-Procedure Summary    The patient was reevaluated immediately before administering moderate or deep sedation or anesthesia.    Sedatives: Fentanyl 125mcg and Midazolam (Versed) 2.5mg    Vital signs and pulse oximetry were monitored and remained stable throughout the procedure, and sedation was maintained until  the procedure was complete.  The patient was monitored by staff until sedation discharge criteria were met.    Patient tolerance: Patient tolerated the procedure well with no immediate complications.    Time of sedation in minutes: 40 minutes from beginning to end of physician one to one monitoring.    Ajay Vyas MD    Use [facility] to contact: Neuro IR Fellow ([place])  Fellow's personal cell phone number provided to bedside RN.

## 2025-07-28 NOTE — DISCHARGE INSTRUCTIONS
[facility]         Interventional Radiology  Discharge Instructions Post Cerebral Angiogram (NEURO)    AFTER YOU GO HOME  If you were given sedation, for the first 24 hours: we recommend that an adult stay with you, DO NOT drive or operate machinery at home or at work, DO NOT smoke, DO NOT make any important or legal decisions.  DO NOT drink alcoholic beverages the day of your procedure  DO relax and take it easy for 24 hours  DO drink plenty of fluids  DO resume your regular diet, unless otherwise instructed by your Primary Physician  DO NOT take a shower for at least 12 hours following your procedure. No tub bath, hot tubs, or swimming for 5 days. Do NOT scrub the procedure site vigorously for 5 days.  If you are taking a medication called Glucophage, Glucovance, or Metformin; these medications need to be held for approximately 48 hours following the proceduce      Care of wrist or arm site  It is normal to have soreness at the puncture site and mild tingling in your hand for up to 3 days.  For 2 days, do not use your hand or arm to support your weight (such as rising from a chair) or bend your wrist (such as lifting a garage door).  For 2 days, do not do any strenuous exercise, do not lift more than 5 pounds or exercise your arm (tennis, golf or bowling).  No lotion or powder to the puncture site for 3 days  If you start bleeding from the site in your arm:  Sit down and press firmly on the site with your fingers for 10 minutes. Call your doctor as soon as you can.  If the bleeding stops, sit still and keep your wrist straight for 2 hours.  Call 911 right away if you have: Heavy bleeding, bleeding that does not stop.    CALL THE PHYSICIAN IF:  You start bleeding from the procedure site.  You have numbness, coolness or change in color of the arm or leg of the puncture site  You experience changes in your vision, hearing, balance, coordination, speech, thinking or memory  You experience weakness  in one or more extremity  You experience pain or redness at the puncture site  You develop nausea or vomiting  You develop hives or a rash or unexplained itching  You develop a temperature of 101 degrees F or greater      Stroke - Call 911    Remember: BE FAST       BALANCE--Sudden loss of balance or coordination. Example: trouble walking     EYES--Sudden problem seeing out of one or both eyes     FACE--Sudden numbness or change to one side of the face. Examples: facial droop, uneven smile     ARM--Sudden numbness or weakness in one arm or leg     SPEECH--Sudden changes in speech or talking that doesn t make sense     TIME--if the person is having any of the symptoms above, CALL 911 immediately.      Symptoms may go away, then come back.     Sudden, worst headache of your life      Procedure Physician:   Date of procedure: 7/28/2025    If you have questions or concerns, call the RN Care Coordinators: Sheila Baltazar or Agnes Rodriguez at [phone], option 1, Monday-Friday 8:00 am to 4:30 pm. After business hours, call the  at [phone], option #4 for , and have the Neuro-Interventional Fellow paged. Someone is on call 24 hrs/day    [facility] toll free number: [phone] Monday-Friday 8:00 am to 4:30 pm  [facility] Emergency Dept: [phone]

## 2025-07-28 NOTE — PROGRESS NOTES
[facility]     Endovascular Surgical Neuroradiology Pre-Procedure Note      HPI:  Antoine Russo is a 79 year old male with history of atrial fibrillation on warfarin, hypertension, diabetes, peripheral arterial disease, presenting for diagnostic cerebral angiogram to further evaluate a 3mm left superior cerebellar artery aneurysm.    Medical History:  Reviewed    Surgical History:  Reviewed    Family History:  Reviewed    Social History:  Reviewed    Allergies:  Reviewed    Is there a contrast allergy?  No    Medications:  I have reviewed this patient's current medications  Medications Prior to Admission   Medication Sig Dispense Refill Last Dose/Taking    acetaminophen (TYLENOL) 500 MG tablet Take 1,000 mg by mouth every 8 hours as needed for pain.       atorvastatin (LIPITOR) 80 MG tablet Take 1 tablet (80 mg) by mouth daily. 90 tablet 3     Continuous Glucose Sensor (FREESTYLE LISA 3 PLUS SENSOR) MISC Change every 15 days. 6 each 3     empagliflozin (JARDIANCE) 25 MG TABS tablet Take 1 tablet (25 mg) by mouth daily. 30 tablet 5     furosemide (LASIX) 20 MG tablet Take 1 tablet (20 mg) by mouth daily as needed (leg swelling). 15 tablet 0     insulin glargine (LANTUS PEN) 100 UNIT/ML pen Inject 50 Units subcutaneously at bedtime. Increase by 2 units every 3 days until blood sugars in am under 120 up to max 60 units daily 60 mL 3     insulin pen needle (BD LUIS U/F) 32G X 4 MM miscellaneous USE PEN NEEDLE ONCE DAILY AS DIRECTED 60 each 0     lisinopril (ZESTRIL) 20 MG tablet Take 1 tablet (20 mg) by mouth daily. 90 tablet 3     metoprolol succinate ER (TOPROL XL) 50 MG 24 hr tablet Take 1.5 tablets (75 mg) by mouth 2 times daily. 270 tablet 3     multivitamin, therapeutic with minerals (THERA-VIT-M) TABS Take 1 tablet by mouth daily. 30 each 1     pregabalin (LYRICA) 150 MG capsule Take 1 capsule by mouth twice daily 180 capsule 1     tamsulosin (FLOMAX) 0.4 MG  capsule Take 1 capsule (0.4 mg) by mouth daily. 90 capsule 3     vitamin B-12 (CYANOCOBALAMIN) 100 MCG tablet Take 1 tablet by mouth daily.       Vitamin D3 (CHOLECALCIFEROL) 25 mcg (1000 units) tablet Take 1 tablet by mouth daily       warfarin ANTICOAGULANT (COUMADIN/JANTOVEN) 2.5 MG tablet Take 0.5-1 tablets (1.25-2.5 mg) by mouth every evening. Take 2.5 mg every Mon, Wed, Fri; 1.25 mg all other days or as directed by Anticoagulation Clinic 70 tablet 1    .    ROS:  The 5 point Review of Systems is negative other than noted in the HPI or here.     PHYSICAL EXAMINATION  Vital Signs:  B/P: Data Unavailable,  T: Data Unavailable,  P: Data Unavailable,  R: Data Unavailable    Cardio:  RRR  Pulmonary:  no respiratory distress  Abdomen:  soft    Neurologic  Mental Status:  fully alert, attentive and oriented, follows commands, speech clear and fluent  Cranial Nerves:  visual fields intact, EOMI with normal smooth pursuit, facial movements symmetric, hearing not formally tested but intact to conversation, palate elevation symmetric and uvula midline, no dysarthria, shoulder shrug strong bilaterally, tongue protrusion midline  Motor:  no abnormal movements, no pronator drift, able to move all limbs spontaneously  Sensory:  intact to light touch  Coordination:  finger to nose without ataxia    Pre-procedure National Institutes of Health Stroke Scale:   Not applicable    LABS  (most recent Cr, BUN, GFR, PLT, INR, PTT within the past 7 days):  Recent Labs   Lab 07/24/25  0727   INR 2.9*        Platelet Function P2Y12 (PRU):  Not applicable      ASSESSMENT: superior cerebellar artery aneurysm    PLAN: diagnostic cerebral angiogram        PRE-PROCEDURE SEDATION ASSESSMENT     Pre-Procedure Sedation Assessment done at 0700.    Expected Level:  Moderate Sedation    Indication:  Sedation is required to allow for neurointerventional procedure.    Consent obtained from patient after discussing the risks, benefits and alternatives.      PO Intake:  Appropriately NPO for procedure    ASA Class:  Class 2 - MILD SYSTEMIC DISEASE, NO ACUTE PROBLEMS, NO FUNCTIONAL LIMITATIONS.    Mallampati:  Grade 2:  Soft palate, base of uvula, tonsillar pillars, and portion of posterior pharyngeal wall visible    History and physical reviewed and no updates needed. I have reviewed the lab findings, diagnostic data, medications, and the plan for sedation. I have determined this patient to be an appropriate candidate for the planned sedation and procedure and have reassessed the patient IMMEDIATELY PRIOR to sedation and procedure.    Patient was discussed with the Attending, Dr. Gonzalez, who agrees with the plan.    Elle Garcia MD   Fellow, Endovascular Surgical Neuroradiology    I have reviewed the history. I have seen and examined the patient myself and agree with the assessment and plan above.  MYRNA Gonzalez MD

## 2025-07-28 NOTE — OP NOTE
Date of procedure: 7/28/2025  Antoine Russo.   Male, 79 year old, 1945  MRN: [id number]    Pre and postoperative diagnosis:  1.  Unruptured right superior cerebellar artery aneurysm    Title of procedures:  1.  Catheterization of right radial artery  2.  Catheterization of right vertebral artery  3.  Multiview diagnostic cerebral angiography  4.  Interpretation of images    : Rohan Gonzalez MD    [place]:  1.  Ajay Vyas MD  2.  Elle Garcia MD    Anesthesia: Conscious sedation and local anesthesia were provided.  All IV medications were administered by the radiology nursing staff under my supervision.  The radiology nursing staff monitored the patient's vital signs throughout the procedure.    Sedation time: 40 minutes of 1:1 attending monitoring time    Medications: Midazolam 2.5 mg IV, fentanyl 125 mcg IV, radial artery cocktail - 2.5 mg IA verapamil, 200 mcg IA nitroglycerin, 3000 units IA heparin; 1% lidocaine local    Fluoroscopy time: 7.7 minutes    Fluoroscopy dose: 378.2 mGy    Contrast: 45 mL Visipaque-[id number]    History of present illness: Mr. Russo is a 79-year-old man with an incidental posterior circulation aneurysm, discovered during evaluation of upper extremity paresthesias.  Of note, he is on warfarin for atrial fibrillation.  He presents for diagnostic cerebral angiogram to define the right superior cerebellar artery aneurysm in more detail.  The patient consented after description of the procedure and its risks.    Description of technique: The patient was placed in the supine position on the angiography table.  He passed an Emile's test.  The right wrist was prepared and draped in the usual sterile fashion.  A timeout was performed.  Upon administration of moderate sedation, the right wrist was infiltrated with 1% lidocaine.  Under ultrasound guidance, the right radial artery was punctured and the Seldinger technique was used to place a slender 5 German sheath.   An image was not stored in the electronic medical record.  The radial artery cocktail was infused through the sheath.  We advanced a 5 Marshallese angled glide catheter over a 0.035 inch Glidewire.  Under roadmap guidance, the right vertebral artery was catheterized.  Multiview angiography was completed over the cranium.  At the end of the procedure, the catheter and sheath were removed.  Hemostasis was achieved with a TR band.  Blood loss was approximately 5 mL.  The patient was transported to the recovery area without incident.      I was present for the key portions and immediately available for the entire procedure.    Interpretation of images:    Right subclavian artery injection, AP view over the chest and neck: The origin of the right vertebral artery is patent without stenosis.  There are mild diffuse atherosclerotic changes in the subclavian artery.  There is reflux of contrast into the right common carotid artery with antegrade filling of the internal and external carotid arteries.  There are multiple surgical clips on both sides of the neck.  The right vertebral artery has a relatively small caliber.    Right vertebral artery injection, AP, lateral, oblique views over the cranium: There is retrograde flash-filling of the left vertebral artery down to the V3 segment.  There are mild diffuse atherosclerotic changes throughout the vertebrobasilar system.  The distal segments of the right vertebral artery are widely patent.  The right PICA has an intradural origin.  The basilar artery bifurcates into the bilateral posterior cerebral arteries, both of which have adult configuration.  At the origin of the right superior cerebellar artery, there is a superior laterally projecting aneurysm, measuring approximately 3 mm.  The aneurysm has a wide neck and the superior cerebellar artery arises from the aneurysm neck.  The aneurysm is best seen on the AP view-0 degrees rotation and 37 degrees cranial.  On the lateral  view, the aneurysm is seen at MACHUCA 67 degrees and caudal 7 degrees.    Overall impression:  3 mm right superior cerebellar artery aneurysm with a wide neck and projecting superiorly and laterally.    Rohan Gonzalez MD  Endovascular surgical neuroradiology

## 2025-07-28 NOTE — PROGRESS NOTES
Discharge criteria met. Right radial site intact. Pt able to ambulate, eat and void without difficulty. Discharge instructions reviewed with pt. PIV removed. W/c to front door with spouse.

## 2025-07-29 ENCOUNTER — PATIENT OUTREACH (OUTPATIENT)
Dept: NEUROSURGERY | Facility: CLINIC | Age: 80
End: 2025-07-29
Payer: COMMERCIAL

## 2025-07-29 DIAGNOSIS — I67.1 CEREBRAL ANEURYSM, NONRUPTURED: Primary | ICD-10-CM

## 2025-07-29 NOTE — PROGRESS NOTES
Endovascular Surgical Neuroradiology - Post Discharge Call    Admit: 7/28/25    Discharge: 7/28/25    Facility: [facility]    MD/Service: Dr. Gonzalez for Dr. Le/ALEXIS    Procedure Diagnosis: cerebral aneurysm     Procedure:   Diagnostic cervicocerebral angiography (right radial artery puncture)    Findings: 3.1 x 2.3 mm right SCA aneurysm     Plan:    Resume Jardiance as prescribed   MRA head in 1 year followed by clinic visit (orders placed)    Spoke with patient. Denies bleeding, drainage, pain, swelling, warmth, redness at puncture site. Eating and drinking ok. Patient aware of the plan and in agreement. Patient has my contact information and was encouraged to call with questions/concerns.     Sheila Baltazar RN 7/29/2025 10:30 AM

## 2025-08-11 ENCOUNTER — ALLIED HEALTH/NURSE VISIT (OUTPATIENT)
Dept: EDUCATION SERVICES | Facility: CLINIC | Age: 80
End: 2025-08-11
Payer: COMMERCIAL

## 2025-08-11 ENCOUNTER — OFFICE VISIT (OUTPATIENT)
Dept: FAMILY MEDICINE | Facility: CLINIC | Age: 80
End: 2025-08-11
Payer: COMMERCIAL

## 2025-08-11 ENCOUNTER — TELEPHONE (OUTPATIENT)
Dept: PHARMACY | Facility: OTHER | Age: 80
End: 2025-08-11

## 2025-08-11 VITALS
WEIGHT: 209 LBS | SYSTOLIC BLOOD PRESSURE: 160 MMHG | HEART RATE: 74 BPM | HEIGHT: 69 IN | RESPIRATION RATE: 22 BRPM | BODY MASS INDEX: 30.96 KG/M2 | TEMPERATURE: 97.1 F | OXYGEN SATURATION: 94 % | DIASTOLIC BLOOD PRESSURE: 78 MMHG

## 2025-08-11 VITALS — WEIGHT: 209 LBS | BODY MASS INDEX: 30.86 KG/M2

## 2025-08-11 DIAGNOSIS — L03.116 LEFT LEG CELLULITIS: Primary | ICD-10-CM

## 2025-08-11 DIAGNOSIS — S81.802A WOUND OF LEFT LOWER EXTREMITY, INITIAL ENCOUNTER: ICD-10-CM

## 2025-08-11 DIAGNOSIS — Z79.4 TYPE 2 DIABETES MELLITUS WITH DIABETIC NEUROPATHY, WITH LONG-TERM CURRENT USE OF INSULIN (H): ICD-10-CM

## 2025-08-11 DIAGNOSIS — E78.5 HYPERLIPIDEMIA LDL GOAL <100: ICD-10-CM

## 2025-08-11 DIAGNOSIS — E11.40 TYPE 2 DIABETES MELLITUS WITH DIABETIC NEUROPATHY, WITH LONG-TERM CURRENT USE OF INSULIN (H): ICD-10-CM

## 2025-08-11 DIAGNOSIS — N18.2 CHRONIC KIDNEY DISEASE, STAGE 2 (MILD): ICD-10-CM

## 2025-08-11 DIAGNOSIS — I10 HYPERTENSION, BENIGN ESSENTIAL, GOAL BELOW 140/90: Chronic | ICD-10-CM

## 2025-08-11 PROCEDURE — 3077F SYST BP >= 140 MM HG: CPT | Performed by: NURSE PRACTITIONER

## 2025-08-11 PROCEDURE — G0108 DIAB MANAGE TRN  PER INDIV: HCPCS | Performed by: DIETITIAN, REGISTERED

## 2025-08-11 PROCEDURE — 1126F AMNT PAIN NOTED NONE PRSNT: CPT | Performed by: NURSE PRACTITIONER

## 2025-08-11 PROCEDURE — 99214 OFFICE O/P EST MOD 30 MIN: CPT | Performed by: NURSE PRACTITIONER

## 2025-08-11 PROCEDURE — 3078F DIAST BP <80 MM HG: CPT | Performed by: NURSE PRACTITIONER

## 2025-08-11 RX ORDER — CEPHALEXIN 500 MG/1
500 CAPSULE ORAL 2 TIMES DAILY
Qty: 20 CAPSULE | Refills: 0 | Status: SHIPPED | OUTPATIENT
Start: 2025-08-11 | End: 2025-08-21

## 2025-08-11 ASSESSMENT — PAIN SCALES - GENERAL: PAINLEVEL_OUTOF10: NO PAIN (0)

## 2025-08-14 ENCOUNTER — PATIENT OUTREACH (OUTPATIENT)
Dept: CARE COORDINATION | Facility: CLINIC | Age: 80
End: 2025-08-14

## 2025-08-14 ENCOUNTER — LAB (OUTPATIENT)
Dept: LAB | Facility: CLINIC | Age: 80
End: 2025-08-14
Payer: COMMERCIAL

## 2025-08-14 ENCOUNTER — ANTICOAGULATION THERAPY VISIT (OUTPATIENT)
Dept: ANTICOAGULATION | Facility: CLINIC | Age: 80
End: 2025-08-14

## 2025-08-14 ENCOUNTER — RESULTS FOLLOW-UP (OUTPATIENT)
Dept: ANTICOAGULATION | Facility: CLINIC | Age: 80
End: 2025-08-14

## 2025-08-14 DIAGNOSIS — Z13.6 SCREENING FOR CARDIOVASCULAR CONDITION: Primary | ICD-10-CM

## 2025-08-14 DIAGNOSIS — I25.10 CAD (CORONARY ARTERY DISEASE): ICD-10-CM

## 2025-08-14 DIAGNOSIS — Z79.01 LONG TERM CURRENT USE OF ANTICOAGULANT THERAPY: Chronic | ICD-10-CM

## 2025-08-14 DIAGNOSIS — I48.20 CHRONIC ATRIAL FIBRILLATION (H): ICD-10-CM

## 2025-08-14 DIAGNOSIS — I48.20 CHRONIC ATRIAL FIBRILLATION (H): Primary | Chronic | ICD-10-CM

## 2025-08-14 DIAGNOSIS — Z79.01 LONG TERM CURRENT USE OF ANTICOAGULANT THERAPY: ICD-10-CM

## 2025-08-14 LAB — INR BLD: 3.1 (ref 0.9–1.1)

## 2025-08-19 ENCOUNTER — HOSPITAL ENCOUNTER (OUTPATIENT)
Dept: ULTRASOUND IMAGING | Facility: CLINIC | Age: 80
Discharge: HOME | End: 2025-08-19
Attending: PHYSICIAN ASSISTANT
Payer: COMMERCIAL

## 2025-08-19 ENCOUNTER — OFFICE VISIT (OUTPATIENT)
Dept: VASCULAR SURGERY | Facility: CLINIC | Age: 80
End: 2025-08-19
Attending: PHYSICIAN ASSISTANT
Payer: COMMERCIAL

## 2025-08-19 VITALS — DIASTOLIC BLOOD PRESSURE: 84 MMHG | SYSTOLIC BLOOD PRESSURE: 162 MMHG | OXYGEN SATURATION: 91 % | HEART RATE: 90 BPM

## 2025-08-19 DIAGNOSIS — I73.9 PAD (PERIPHERAL ARTERY DISEASE): Primary | ICD-10-CM

## 2025-08-19 DIAGNOSIS — I73.9 PAD (PERIPHERAL ARTERY DISEASE): ICD-10-CM

## 2025-08-19 PROCEDURE — 3077F SYST BP >= 140 MM HG: CPT | Performed by: PHYSICIAN ASSISTANT

## 2025-08-19 PROCEDURE — 93924 LWR XTR VASC STDY BILAT: CPT

## 2025-08-19 PROCEDURE — 1125F AMNT PAIN NOTED PAIN PRSNT: CPT | Performed by: PHYSICIAN ASSISTANT

## 2025-08-19 PROCEDURE — 3079F DIAST BP 80-89 MM HG: CPT | Performed by: PHYSICIAN ASSISTANT

## 2025-08-19 PROCEDURE — 99214 OFFICE O/P EST MOD 30 MIN: CPT | Performed by: PHYSICIAN ASSISTANT

## 2025-08-19 ASSESSMENT — PAIN SCALES - GENERAL: PAINLEVEL_OUTOF10: MILD PAIN (3)

## 2025-08-20 ENCOUNTER — PATIENT OUTREACH (OUTPATIENT)
Dept: CARE COORDINATION | Facility: CLINIC | Age: 80
End: 2025-08-20
Payer: COMMERCIAL

## 2025-09-04 ENCOUNTER — LAB (OUTPATIENT)
Dept: LAB | Facility: CLINIC | Age: 80
End: 2025-09-04
Payer: COMMERCIAL

## 2025-09-04 ENCOUNTER — ANTICOAGULATION THERAPY VISIT (OUTPATIENT)
Dept: ANTICOAGULATION | Facility: CLINIC | Age: 80
End: 2025-09-04

## 2025-09-04 DIAGNOSIS — I48.20 CHRONIC ATRIAL FIBRILLATION (H): Primary | Chronic | ICD-10-CM

## 2025-09-04 DIAGNOSIS — I48.20 CHRONIC ATRIAL FIBRILLATION (H): ICD-10-CM

## 2025-09-04 DIAGNOSIS — Z79.01 LONG TERM CURRENT USE OF ANTICOAGULANT THERAPY: ICD-10-CM

## 2025-09-04 DIAGNOSIS — Z79.01 LONG TERM CURRENT USE OF ANTICOAGULANT THERAPY: Chronic | ICD-10-CM

## 2025-09-04 LAB — INR BLD: 3 (ref 0.9–1.1)

## (undated) DEVICE — ENDO TUBING CO2 SMARTCAP STERILE DISP 100145CO2EXT

## (undated) DEVICE — EYE SPONGE SPEAR WECK CEL 0008685

## (undated) DEVICE — SOL WATER IRRIG 1000ML BOTTLE 2F7114

## (undated) DEVICE — SOL NACL 0.9% IRRIG 500ML BOTTLE 2F7123

## (undated) DEVICE — SOL BENZOIN 0.5OZ

## (undated) DEVICE — ENDO FORCEP ENDOJAW BIOPSY 2.8MMX230CM FB-220U

## (undated) DEVICE — PROBE DOPPLER STR VTI 20MHZ DISP 108200

## (undated) DEVICE — SU CHROMIC 3-0 SH 27" G122H

## (undated) DEVICE — SU SILK 4-0 TIE 12X30" A303H

## (undated) DEVICE — ENDO FUSION OMNI-TOME 21 FS-OMNI-21 G48675

## (undated) DEVICE — SU SILK 2-0 TIE 12X30" A305H

## (undated) DEVICE — BASIN EMESIS STERILE 500ML 0060

## (undated) DEVICE — PACK ENDOSCOPY GI CUSTOM UMMC

## (undated) DEVICE — VESSEL LOOPS BLUE MINI

## (undated) DEVICE — ESU CORD BIPOLAR GREEN 10-4000

## (undated) DEVICE — SPECIMEN CONTAINER 3OZ W/FORMALIN 59901

## (undated) DEVICE — PACK MINOR CUSTOM ASC

## (undated) DEVICE — NDL ANGIOCATH 20GA 1.25" PROTECTIV 3066

## (undated) DEVICE — DRSG PRIMAPORE 02X3" 7133

## (undated) DEVICE — GLOVE PROTEXIS W/NEU-THERA 7.5  2D73TE75

## (undated) DEVICE — DRAIN PENROSE 0.25"X18" LATEX FREE GR201

## (undated) DEVICE — ENDO BITE BLOCK ADULT OMNI-BLOC

## (undated) DEVICE — LINEN TOWEL PACK X5 5464

## (undated) DEVICE — DEVICE ENDO CLIP INSTINCT PLUS INSC-P-7-230-S  G58010

## (undated) DEVICE — ESU ELEC NDL 1" COATED/INSULATED E1465

## (undated) DEVICE — SUCTION MANIFOLD DORNOCH ULTRA CART UL-CL500

## (undated) DEVICE — SU MONOCRYL 4-0 PS-2 18" UND Y496G

## (undated) DEVICE — NDL ANGIOCATH 14GA 1.25" 3068

## (undated) DEVICE — SPONGE RAY-TEC 4X8" 7318

## (undated) DEVICE — DRSG STERI STRIP 1/2X4" R1547

## (undated) DEVICE — VESSEL LOOPS RED MINI 31145710

## (undated) DEVICE — DECANTER VIAL 2006S

## (undated) DEVICE — DRAPE LAP TRANSVERSE 29421

## (undated) DEVICE — TAPE CLOTH 3" CARDINAL 3TRCL03

## (undated) DEVICE — PAD CHUX UNDERPAD 23X24" 7136

## (undated) DEVICE — GUIDEWIRE NOVAGOLD .018X260CM STR TIP M00552000

## (undated) DEVICE — STRAP UNIVERSAL POSITIONING 2-PIECE 4X47X76" 91-287

## (undated) DEVICE — BLADE CLIPPER SGL USE 9680

## (undated) DEVICE — BIOPSY VALVE BIOSHIELD 00711135

## (undated) DEVICE — ESU GROUND PAD ADULT W/CORD E7507

## (undated) DEVICE — SYR 20ML LL W/O NDL 302830

## (undated) DEVICE — KIT ENDO FIRST STEP DISINFECTANT 200ML W/POUCH EP-4

## (undated) DEVICE — CLIP HORIZON SM YELLOW 001200

## (undated) DEVICE — NDL BLUNT 18GA 1" W/O FILTER 305181

## (undated) DEVICE — PREP CHLORAPREP 26ML TINTED ORANGE  260815

## (undated) DEVICE — SYR 03ML LL W/O NDL 309657

## (undated) RX ORDER — FENTANYL CITRATE 50 UG/ML
INJECTION, SOLUTION INTRAMUSCULAR; INTRAVENOUS
Status: DISPENSED
Start: 2018-04-17

## (undated) RX ORDER — LIDOCAINE HYDROCHLORIDE 10 MG/ML
INJECTION, SOLUTION EPIDURAL; INFILTRATION; INTRACAUDAL; PERINEURAL
Status: DISPENSED
Start: 2025-07-28

## (undated) RX ORDER — FENTANYL CITRATE 50 UG/ML
INJECTION, SOLUTION INTRAMUSCULAR; INTRAVENOUS
Status: DISPENSED
Start: 2017-05-23

## (undated) RX ORDER — ONDANSETRON 2 MG/ML
INJECTION INTRAMUSCULAR; INTRAVENOUS
Status: DISPENSED
Start: 2018-03-29

## (undated) RX ORDER — PROPOFOL 10 MG/ML
INJECTION, EMULSION INTRAVENOUS
Status: DISPENSED
Start: 2018-03-29

## (undated) RX ORDER — BUPIVACAINE HYDROCHLORIDE 5 MG/ML
INJECTION, SOLUTION EPIDURAL; INTRACAUDAL
Status: DISPENSED
Start: 2017-05-23

## (undated) RX ORDER — LIDOCAINE HYDROCHLORIDE 20 MG/ML
INJECTION, SOLUTION EPIDURAL; INFILTRATION; INTRACAUDAL; PERINEURAL
Status: DISPENSED
Start: 2018-03-29

## (undated) RX ORDER — FENTANYL CITRATE 50 UG/ML
INJECTION, SOLUTION INTRAMUSCULAR; INTRAVENOUS
Status: DISPENSED
Start: 2018-03-29

## (undated) RX ORDER — PHENYLEPHRINE HCL IN 0.9% NACL 1 MG/10 ML
SYRINGE (ML) INTRAVENOUS
Status: DISPENSED
Start: 2018-03-29

## (undated) RX ORDER — SIMETHICONE 20 MG/.3ML
EMULSION ORAL
Status: DISPENSED
Start: 2018-04-17

## (undated) RX ORDER — GLYCOPYRROLATE 0.2 MG/ML
INJECTION, SOLUTION INTRAMUSCULAR; INTRAVENOUS
Status: DISPENSED
Start: 2020-10-19

## (undated) RX ORDER — GLYCOPYRROLATE 0.2 MG/ML
INJECTION, SOLUTION INTRAMUSCULAR; INTRAVENOUS
Status: DISPENSED
Start: 2022-10-26

## (undated) RX ORDER — WATER 10 ML/10ML
INJECTION INTRAMUSCULAR; INTRAVENOUS; SUBCUTANEOUS
Status: DISPENSED
Start: 2018-03-29

## (undated) RX ORDER — LEVOFLOXACIN 5 MG/ML
INJECTION, SOLUTION INTRAVENOUS
Status: DISPENSED
Start: 2018-03-29

## (undated) RX ORDER — VERAPAMIL HYDROCHLORIDE 2.5 MG/ML
INJECTION INTRAVENOUS
Status: DISPENSED
Start: 2025-07-28

## (undated) RX ORDER — LIDOCAINE HYDROCHLORIDE 10 MG/ML
INJECTION, SOLUTION INFILTRATION; PERINEURAL
Status: DISPENSED
Start: 2022-09-30

## (undated) RX ORDER — EPHEDRINE SULFATE 50 MG/ML
INJECTION, SOLUTION INTRAMUSCULAR; INTRAVENOUS; SUBCUTANEOUS
Status: DISPENSED
Start: 2018-03-29

## (undated) RX ORDER — LIDOCAINE HYDROCHLORIDE 10 MG/ML
INJECTION, SOLUTION EPIDURAL; INFILTRATION; INTRACAUDAL; PERINEURAL
Status: DISPENSED
Start: 2020-10-19

## (undated) RX ORDER — DEXAMETHASONE SODIUM PHOSPHATE 4 MG/ML
INJECTION, SOLUTION INTRA-ARTICULAR; INTRALESIONAL; INTRAMUSCULAR; INTRAVENOUS; SOFT TISSUE
Status: DISPENSED
Start: 2018-03-29

## (undated) RX ORDER — CEFAZOLIN SODIUM 1 G/3ML
INJECTION, POWDER, FOR SOLUTION INTRAMUSCULAR; INTRAVENOUS
Status: DISPENSED
Start: 2017-05-23

## (undated) RX ORDER — PAPAVERINE HYDROCHLORIDE 30 MG/ML
INJECTION INTRAMUSCULAR; INTRAVENOUS
Status: DISPENSED
Start: 2017-05-23

## (undated) RX ORDER — HEPARIN SODIUM 1000 [USP'U]/ML
INJECTION, SOLUTION INTRAVENOUS; SUBCUTANEOUS
Status: DISPENSED
Start: 2022-09-30

## (undated) RX ORDER — FENTANYL CITRATE 50 UG/ML
INJECTION, SOLUTION INTRAMUSCULAR; INTRAVENOUS
Status: DISPENSED
Start: 2022-09-30

## (undated) RX ORDER — PHENYLEPHRINE HCL IN 0.9% NACL 1 MG/10 ML
SYRINGE (ML) INTRAVENOUS
Status: DISPENSED
Start: 2017-05-23

## (undated) RX ORDER — FENTANYL CITRATE 50 UG/ML
INJECTION, SOLUTION INTRAMUSCULAR; INTRAVENOUS
Status: DISPENSED
Start: 2025-07-28

## (undated) RX ORDER — DEXAMETHASONE SODIUM PHOSPHATE 4 MG/ML
INJECTION, SOLUTION INTRA-ARTICULAR; INTRALESIONAL; INTRAMUSCULAR; INTRAVENOUS; SOFT TISSUE
Status: DISPENSED
Start: 2017-05-23

## (undated) RX ORDER — CLOPIDOGREL BISULFATE 75 MG/1
TABLET ORAL
Status: DISPENSED
Start: 2022-09-30

## (undated) RX ORDER — NITROGLYCERIN 5 MG/ML
VIAL (ML) INTRAVENOUS
Status: DISPENSED
Start: 2025-07-28

## (undated) RX ORDER — PROTAMINE SULFATE 10 MG/ML
INJECTION, SOLUTION INTRAVENOUS
Status: DISPENSED
Start: 2022-09-30

## (undated) RX ORDER — ONDANSETRON 2 MG/ML
INJECTION INTRAMUSCULAR; INTRAVENOUS
Status: DISPENSED
Start: 2017-05-23